# Patient Record
Sex: MALE | Race: WHITE | NOT HISPANIC OR LATINO | Employment: OTHER | URBAN - METROPOLITAN AREA
[De-identification: names, ages, dates, MRNs, and addresses within clinical notes are randomized per-mention and may not be internally consistent; named-entity substitution may affect disease eponyms.]

---

## 2017-09-27 ENCOUNTER — OFFICE VISIT (OUTPATIENT)
Dept: URGENT CARE | Facility: CLINIC | Age: 77
End: 2017-09-27
Payer: MEDICARE

## 2017-09-27 VITALS
SYSTOLIC BLOOD PRESSURE: 130 MMHG | HEIGHT: 69 IN | BODY MASS INDEX: 33.54 KG/M2 | WEIGHT: 226.44 LBS | HEART RATE: 45 BPM | OXYGEN SATURATION: 96 % | DIASTOLIC BLOOD PRESSURE: 60 MMHG | RESPIRATION RATE: 20 BRPM | TEMPERATURE: 96 F

## 2017-09-27 DIAGNOSIS — Z95.1 S/P CABG X 3: ICD-10-CM

## 2017-09-27 DIAGNOSIS — I25.2 MI, OLD: ICD-10-CM

## 2017-09-27 DIAGNOSIS — R42 DIZZINESS: ICD-10-CM

## 2017-09-27 DIAGNOSIS — R00.1 BRADYCARDIA: Primary | ICD-10-CM

## 2017-09-27 DIAGNOSIS — R53.83 FATIGUE, UNSPECIFIED TYPE: ICD-10-CM

## 2017-09-27 PROCEDURE — 1126F AMNT PAIN NOTED NONE PRSNT: CPT | Mod: S$GLB,,, | Performed by: NURSE PRACTITIONER

## 2017-09-27 PROCEDURE — 93010 ELECTROCARDIOGRAM REPORT: CPT | Mod: S$GLB,,, | Performed by: INTERNAL MEDICINE

## 2017-09-27 PROCEDURE — 3078F DIAST BP <80 MM HG: CPT | Mod: S$GLB,,, | Performed by: NURSE PRACTITIONER

## 2017-09-27 PROCEDURE — 99999 PR PBB SHADOW E&M-EST. PATIENT-LVL III: CPT | Mod: PBBFAC,,, | Performed by: NURSE PRACTITIONER

## 2017-09-27 PROCEDURE — 99499 UNLISTED E&M SERVICE: CPT | Mod: S$GLB,,, | Performed by: NURSE PRACTITIONER

## 2017-09-27 PROCEDURE — 1159F MED LIST DOCD IN RCRD: CPT | Mod: S$GLB,,, | Performed by: NURSE PRACTITIONER

## 2017-09-27 PROCEDURE — 93005 ELECTROCARDIOGRAM TRACING: CPT | Mod: S$GLB,,, | Performed by: NURSE PRACTITIONER

## 2017-09-27 PROCEDURE — 99213 OFFICE O/P EST LOW 20 MIN: CPT | Mod: S$GLB,,, | Performed by: NURSE PRACTITIONER

## 2017-09-27 PROCEDURE — 3075F SYST BP GE 130 - 139MM HG: CPT | Mod: S$GLB,,, | Performed by: NURSE PRACTITIONER

## 2017-09-27 PROCEDURE — 3008F BODY MASS INDEX DOCD: CPT | Mod: S$GLB,,, | Performed by: NURSE PRACTITIONER

## 2017-09-27 RX ORDER — ATORVASTATIN CALCIUM 40 MG/1
80 TABLET, FILM COATED ORAL DAILY
COMMUNITY
End: 2018-07-31

## 2017-09-27 RX ORDER — VIT C/E/ZN/COPPR/LUTEIN/ZEAXAN 250MG-90MG
1000 CAPSULE ORAL DAILY
COMMUNITY

## 2017-09-27 NOTE — PROGRESS NOTES
Subjective:       Patient ID: Jake Ortiz is a 77 y.o. male.    Chief Complaint: Dizziness    Dizziness:   Chronicity:  New  Onset:  In the past 7 days   Associated symptoms: light-headedness.no ear congestion, no ear pain, no fever, no headaches, no tinnitus, no nausea, no vomiting, no diaphoresis, no aural fullness, no weakness, no visual disturbances, no syncope, no palpitations, no facial weakness, no slurred speech, no numbness in extremities and no chest pain.    Review of Systems   Constitutional: Positive for fatigue. Negative for activity change, appetite change, chills, diaphoresis and fever.   HENT: Negative for ear discharge, ear pain, postnasal drip, rhinorrhea, sinus pain, sinus pressure, sneezing, sore throat and tinnitus.    Eyes: Negative for photophobia and visual disturbance.   Respiratory: Positive for shortness of breath. Negative for cough and wheezing.    Cardiovascular: Negative for chest pain, palpitations, leg swelling and syncope.        Recently dx with AFIB, xarelto started, follow up with cardiology (Dr. Demarco) in December; he increased his lisinopril to 1 tablet (not 1/2) 3 weeks ago, also takes 325 mg ASA daily (took am dose)   Gastrointestinal: Positive for abdominal pain. Negative for diarrhea, nausea and vomiting.   Skin: Negative for pallor.   Neurological: Positive for dizziness and light-headedness. Negative for tremors, seizures, syncope, facial asymmetry, speech difficulty, weakness, numbness and headaches.   Psychiatric/Behavioral: Negative for confusion. The patient is not nervous/anxious.        Objective:      Physical Exam   Constitutional: He is oriented to person, place, and time. He appears well-developed and well-nourished.  Non-toxic appearance. He does not have a sickly appearance. He does not appear ill. No distress.   Cardiovascular: Regular rhythm, S1 normal, S2 normal and normal heart sounds.  Bradycardia present.    Pulses:       Radial pulses are 2+ on the  right side, and 2+ on the left side.   Pulmonary/Chest: Effort normal and breath sounds normal. No accessory muscle usage. No apnea, no tachypnea and no bradypnea. No respiratory distress. He has no decreased breath sounds. He has no wheezes. He has no rhonchi. He has no rales. He exhibits no tenderness and no bony tenderness.   Neurological: He is alert and oriented to person, place, and time. He is not disoriented. No cranial nerve deficit or sensory deficit.   Skin: Skin is warm and dry. He is not diaphoretic.       Assessment:       1. Bradycardia    2. Fatigue, unspecified type    3. MI, old    4. S/P CABG x 3    5. Dizziness        Plan:   Jake was seen today for dizziness.    Diagnoses and all orders for this visit:    Bradycardia  -     EKG 12-lead; Future    Fatigue, unspecified type    MI, old  Comments:  30 years ago    S/P CABG x 3    Dizziness        Patient referred to ER immediately due to need for a higher level of care. Transported via EMS by to  [facility] ER. Patient stable and in no distress.

## 2017-10-12 ENCOUNTER — OFFICE VISIT (OUTPATIENT)
Dept: FAMILY MEDICINE | Facility: CLINIC | Age: 77
End: 2017-10-12
Payer: MEDICARE

## 2017-10-12 VITALS
WEIGHT: 220 LBS | BODY MASS INDEX: 32.58 KG/M2 | OXYGEN SATURATION: 98 % | DIASTOLIC BLOOD PRESSURE: 78 MMHG | SYSTOLIC BLOOD PRESSURE: 146 MMHG | RESPIRATION RATE: 18 BRPM | HEIGHT: 69 IN | HEART RATE: 82 BPM | TEMPERATURE: 97 F

## 2017-10-12 DIAGNOSIS — E79.0 HYPERURICEMIA: ICD-10-CM

## 2017-10-12 DIAGNOSIS — Z12.11 COLON CANCER SCREENING: ICD-10-CM

## 2017-10-12 DIAGNOSIS — M51.36 DDD (DEGENERATIVE DISC DISEASE), LUMBAR: ICD-10-CM

## 2017-10-12 DIAGNOSIS — R76.8 RHEUMATOID FACTOR POSITIVE: ICD-10-CM

## 2017-10-12 DIAGNOSIS — I10 ESSENTIAL HYPERTENSION: Primary | ICD-10-CM

## 2017-10-12 DIAGNOSIS — Z23 NEED FOR ZOSTER VACCINATION: ICD-10-CM

## 2017-10-12 DIAGNOSIS — E78.2 MIXED HYPERLIPIDEMIA: ICD-10-CM

## 2017-10-12 DIAGNOSIS — J98.4 MIXED RESTRICTIVE AND OBSTRUCTIVE LUNG DISEASE: Chronic | ICD-10-CM

## 2017-10-12 DIAGNOSIS — D71 GRANULOMATOUS DISEASE: ICD-10-CM

## 2017-10-12 DIAGNOSIS — I70.0 ARTERIOSCLEROSIS OF AORTA: ICD-10-CM

## 2017-10-12 DIAGNOSIS — I25.10 CORONARY ARTERY DISEASE INVOLVING NATIVE CORONARY ARTERY OF NATIVE HEART WITHOUT ANGINA PECTORIS: ICD-10-CM

## 2017-10-12 DIAGNOSIS — C61 ADENOCARCINOMA OF PROSTATE: ICD-10-CM

## 2017-10-12 DIAGNOSIS — J43.9 MIXED RESTRICTIVE AND OBSTRUCTIVE LUNG DISEASE: Chronic | ICD-10-CM

## 2017-10-12 PROCEDURE — G0009 ADMIN PNEUMOCOCCAL VACCINE: HCPCS | Mod: S$GLB,,, | Performed by: NURSE PRACTITIONER

## 2017-10-12 PROCEDURE — 90732 PPSV23 VACC 2 YRS+ SUBQ/IM: CPT | Mod: S$GLB,,, | Performed by: NURSE PRACTITIONER

## 2017-10-12 PROCEDURE — 96160 PT-FOCUSED HLTH RISK ASSMT: CPT | Mod: S$GLB,,, | Performed by: NURSE PRACTITIONER

## 2017-10-12 PROCEDURE — 99999 PR PBB SHADOW E&M-EST. PATIENT-LVL IV: CPT | Mod: PBBFAC,,, | Performed by: NURSE PRACTITIONER

## 2017-10-12 PROCEDURE — 99499 UNLISTED E&M SERVICE: CPT | Mod: S$GLB,,, | Performed by: NURSE PRACTITIONER

## 2017-10-12 RX ORDER — NAPROXEN SODIUM 220 MG/1
TABLET, FILM COATED ORAL
COMMUNITY
Start: 2017-09-30 | End: 2018-05-21

## 2017-10-12 RX ORDER — PHENOL 1.4 %
300 AEROSOL, SPRAY (ML) MUCOUS MEMBRANE
COMMUNITY
End: 2017-10-12

## 2017-10-17 PROBLEM — I70.0 ARTERIOSCLEROSIS OF AORTA: Status: ACTIVE | Noted: 2017-10-17

## 2017-10-17 PROBLEM — C61 ADENOCARCINOMA OF PROSTATE: Status: ACTIVE | Noted: 2017-10-17

## 2017-10-17 PROBLEM — D71 GRANULOMATOUS DISEASE: Status: ACTIVE | Noted: 2017-10-17

## 2017-10-19 ENCOUNTER — OFFICE VISIT (OUTPATIENT)
Dept: FAMILY MEDICINE | Facility: CLINIC | Age: 77
End: 2017-10-19
Payer: MEDICARE

## 2017-10-19 ENCOUNTER — HOSPITAL ENCOUNTER (OUTPATIENT)
Dept: RADIOLOGY | Facility: HOSPITAL | Age: 77
Discharge: HOME | End: 2017-10-19
Attending: FAMILY MEDICINE
Payer: MEDICARE

## 2017-10-19 VITALS
RESPIRATION RATE: 14 BRPM | WEIGHT: 216.06 LBS | HEART RATE: 81 BPM | HEIGHT: 70 IN | TEMPERATURE: 98 F | BODY MASS INDEX: 30.93 KG/M2 | SYSTOLIC BLOOD PRESSURE: 118 MMHG | DIASTOLIC BLOOD PRESSURE: 62 MMHG | OXYGEN SATURATION: 97 %

## 2017-10-19 DIAGNOSIS — J43.9 MIXED RESTRICTIVE AND OBSTRUCTIVE LUNG DISEASE: ICD-10-CM

## 2017-10-19 DIAGNOSIS — Z95.1 S/P CABG X 3: ICD-10-CM

## 2017-10-19 DIAGNOSIS — Z00.00 ANNUAL PHYSICAL EXAM: Primary | ICD-10-CM

## 2017-10-19 DIAGNOSIS — C61 ADENOCARCINOMA OF PROSTATE: ICD-10-CM

## 2017-10-19 DIAGNOSIS — J98.4 MIXED RESTRICTIVE AND OBSTRUCTIVE LUNG DISEASE: ICD-10-CM

## 2017-10-19 DIAGNOSIS — D71 GRANULOMATOUS DISEASE: ICD-10-CM

## 2017-10-19 DIAGNOSIS — R76.8 RHEUMATOID FACTOR POSITIVE: ICD-10-CM

## 2017-10-19 DIAGNOSIS — I10 ESSENTIAL HYPERTENSION: ICD-10-CM

## 2017-10-19 DIAGNOSIS — I70.0 ARTERIOSCLEROSIS OF AORTA: ICD-10-CM

## 2017-10-19 DIAGNOSIS — E78.2 MIXED HYPERLIPIDEMIA: ICD-10-CM

## 2017-10-19 DIAGNOSIS — I48.91 ATRIAL FIBRILLATION, UNSPECIFIED TYPE: ICD-10-CM

## 2017-10-19 DIAGNOSIS — I25.10 CORONARY ARTERY DISEASE INVOLVING NATIVE CORONARY ARTERY OF NATIVE HEART WITHOUT ANGINA PECTORIS: ICD-10-CM

## 2017-10-19 PROCEDURE — 99397 PER PM REEVAL EST PAT 65+ YR: CPT | Mod: S$GLB,,, | Performed by: FAMILY MEDICINE

## 2017-10-19 PROCEDURE — 71020 XR CHEST PA AND LATERAL: CPT | Mod: 26,,, | Performed by: RADIOLOGY

## 2017-10-19 PROCEDURE — 99999 PR PBB SHADOW E&M-EST. PATIENT-LVL III: CPT | Mod: PBBFAC,,, | Performed by: FAMILY MEDICINE

## 2017-10-19 PROCEDURE — 99499 UNLISTED E&M SERVICE: CPT | Mod: S$GLB,,, | Performed by: FAMILY MEDICINE

## 2017-10-19 PROCEDURE — 71020 XR CHEST PA AND LATERAL: CPT | Mod: TC,PO

## 2017-10-19 NOTE — PROGRESS NOTES
"Jake Ortiz presented for a  Medicare AWV and comprehensive Health Risk Assessment today. The following components were reviewed and updated:    · Medical history  · Family History  · Social history  · Allergies and Current Medications  · Health Risk Assessment  · Health Maintenance  · Care Team     ** See Completed Assessments for Annual Wellness Visit within the encounter summary.**       The following assessments were completed:  · Living Situation  · CAGE  · Depression Screening  · Timed Get Up and Go  · Whisper Test  · Cognitive Function Screening  · Nutrition Screening  · ADL Screening  · PAQ Screening    Vitals:    10/12/17 1054   BP: (!) 146/78   Pulse: 82   Resp: 18   Temp: 97.4 °F (36.3 °C)   TempSrc: Tympanic   SpO2: 98%   Weight: 99.8 kg (220 lb 0.3 oz)   Height: 5' 9" (1.753 m)     Body mass index is 32.49 kg/m².  Physical Exam      Diagnoses and health risks identified today and associated recommendations/orders:    1. Essential hypertension  Uncontrolled. Follow up with PCP    2. Mixed hyperlipidemia  Uncontrolled. Needs current lipid panel. Continue statin therapy with low fat diet    3. Coronary artery disease involving native coronary artery of native heart without angina pectoris  Stable. Continue current treatment plan    4. DDD (degenerative disc disease), lumbar  Stable. Continue current treatment plan    5. Rheumatoid factor positive  Follow up with rheum    6. Granulomatous disease  Stable. Continue treatment plan    7. Arteriosclerosis of aorta  stable    8. Adenocarcinoma of prostate  Remission. Follow up with urology and heme/onc    9. Mixed restrictive and obstructive lung disease  stable    10. Hyperuricemia  stable    11. Colon cancer screening  appt schedule with PCP    12. Need for zoster vaccination  appt scheduled with PCP      Provided Jake with a 5-10 year written screening schedule and personal prevention plan. Recommendations were developed using the USPSTF age appropriate " recommendations. Education, counseling, and referrals were provided as needed. After Visit Summary printed and given to patient which includes a list of additional screenings\tests needed.    No Follow-up on file.    Shraddha Alvarenga NP

## 2017-10-19 NOTE — PROGRESS NOTES
Jake Ortiz 77 y.o. White male      HPI- The patient is presenting today for Annual Exam  78yo male presents today for annual examination. No hearing or vision concerns are reported. Diet is described as stable. He denies any sleep related issues and there is no concern for any anxiety or depression. Elimination patterns are described within normal limits. Patient has underlying atrial fibrillation and has recently undergone a pacemaker placement secondary to symptomatic bradycardia. He sees Dr. Menendez for care- next visit is scheduled in December 2017. Patient has been followed at [facility] for prostate cancer- he has not had any assessment this year. He is unable to recall the name of his specialist. There is no active treatment plan in place with regard to the prostate cancer currently. In 2015 he was seen by Pulmonology with further recommendations for CT of the chest, PFT, etc. He has been lost to follow-up. There are no respiratory concerns reported. In 2014 he had a workup per Rheumatology with findings of positive rheumatoid factor but has again been lost to follow-up. He does report some low back pain but otherwise denies any joint discomfort. Compliance with his BP medication regimen, Xarelto and statin use is reported.      Past Medical History:   Diagnosis Date    Abnormal PFT     Arthritis     Atrial fibrillation 10/19/2017    Back pain     Coronary artery disease     Hyperlipemia     Hypertension     Myocardial infarction     Obesity     Prostate cancer 2015    Tobacco dependence      Past Surgical History:   Procedure Laterality Date    CORONARY ARTERY BYPASS GRAFT  1987    SPINE SURGERY      fusion    TONSILLECTOMY       Family History   Problem Relation Age of Onset    Heart attack Mother     Diabetes Mother     Heart disease Mother     Stroke Father     Heart disease Father     Heart disease Brother      Current Outpatient Prescriptions   Medication Sig Dispense  "Refill    aspirin 81 MG Chew       atorvastatin (LIPITOR) 40 MG tablet Take 40 mg by mouth once daily.      cholecalciferol, vitamin D3, (VITAMIN D3) 1,000 unit capsule Take 5,000 Units by mouth once daily.      lisinopril-hydrochlorothiazide (PRINZIDE,ZESTORETIC) 20-25 mg Tab Take 1 tablet by mouth once daily.      multivitamin capsule Take 1 capsule by mouth once daily.      rivaroxaban (XARELTO) 20 mg Tab Take 20 mg by mouth.       No current facility-administered medications for this visit.      Allergies-  Review of patient's allergies indicates:   Allergen Reactions    Amiodarone      Other reaction(s): bp becomes too low     ROS-   CONSTITUTIONAL- No fever, chills, fatigue or weight loss  HEENT- No vision changes, ear pain, hearing loss  CHEST- No cough, shortness of breath  CV- No chest pain, palpitations, edema  Abdomen- No abdominal pain, change in bowel habits, blood in stool  - No change in urination, no dysuria, no hematuria, no hesitancy, no dribbling, no urgency  Neurologic- No headaches, speech changes, vertigo, gait changes  Musculoskeletal- No joint pain, + back pain, no myalgias  Hematologic- + bruising, no bleeding, no lymphadenopathy  Psych- No change in mood, no trouble with sleep  Integument- No rashes, no skin changes    /62   Pulse 81   Temp 97.5 °F (36.4 °C) (Temporal)   Resp 14   Ht 5' 10" (1.778 m)   Wt 98 kg (216 lb 0.8 oz)   SpO2 97%   BMI 31.00 kg/m²     PE-  CONSTITIONAL- Well developed, well nourished, no acute distress  HEENT- Normal cephalic, atraumatic, PERRLA, right ear external- no abnormality, left ear external- no abnormality, right TM- normal to visualization, left TM- normal to visualization, moist mucus membranes, no oropharyngeal abnormality visualized nasal turbinates are clear  Neck- Full range of motion, no thyromegaly, no cervical lymphadenopathy  CV- Normal rate and rhythm, heart sounds normal, no murmurs, rubs or gallops  Chest- Breath sounds " are normal and equal bilaterally, normal inspiratory and expiratory efforts  Abdomen- Bowel sounds are equal in all four quadrants, non tender to palpation, soft, no distention  Musculoskeletal- Normal ROM of the extremities, no tenderness  Neurologic- Alert, orientated x 3, cranial nerves intact  Skin- Warm and dry to touch, no rashes, no visible lesions  Psych- Mood and affect normal, Behavior normal    Assessment and Plan-  Annual physical exam  Spent length of time today discussing previous workups and need for further assessment based on several abnormalities that have previously been identified and for which he has been lost to follow-up. General health screening recommendations have also been reviewed. Emphasized healthy eating and physical activity. Anticipatory guidance has been provided with regard to age and informational handouts have been given. He has declined immunization update today. He has declined orders for C scope. He has declined scheduling specialty evaluations. Next wellness evaluation is advised with PCP in 1 year.    Atrial fibrillation, unspecified type  Rate controlled. On Xarelto in combination with ASA. Continuation of current treatment is advised.    Essential hypertension  Stable with current treatment. Recommend continuation of RX measures in combination with heart healthy diet. Target BP <140/90.    Mixed hyperlipidemia  Currently on statin therapy per Cardiology. Per Dr. Menendez's office there is no lipid panel on file within the past year. Will arrange for updated labs.  -     Lipid panel; Future; Expected date: 10/19/2017  -     ALT (SGPT); Future; Expected date: 10/19/2017  -     AST (SGOT); Future; Expected date: 10/19/2017    Mixed restrictive and obstructive lung disease  No acute findings on CXR. Recommend proceeding with PFT's and further assessment per Pulmonology. He has declined.  -     X-Ray Chest PA And Lateral; Future; Expected date: 10/19/2017    Granulomatous  disease  Reviewed notes from Pulmonology with the patient. Discussed further assessment per Pulmonology as workup was never completed previously. He has declined at this time.    Rheumatoid factor positive  Reviewed previous records per Rheumatology. Discussed having update specialty evaluation as well as updated lab assessment- he has declined.    Adenocarcinoma of prostate  Overdue for assessment. Appointment with his specialist at [facility] has been arranged today.    Coronary artery disease involving native coronary artery of native heart without angina pectoris  Continue with treatment and follow-up recommendations as per Cardiology.    Arteriosclerosis of aorta  Emphasized need for BP and lipid control.    S/P CABG x 3  Continue with treatment and follow-up recommendations as per Cardiology.    Records request from Dr. Menendez for most recent labs  Records request [facility]

## 2017-10-19 NOTE — PROGRESS NOTES
Appointment made for Oncology   For Dr. Cueto at [facility]  Dec 1,2017  Pt is aware and records will be sent  Also records will be sent from  Dr. Menendez's office. they just went  from  Paper charts t/ will request records that are in storage  And fax

## 2017-10-24 ENCOUNTER — LAB VISIT (OUTPATIENT)
Dept: LAB | Facility: HOSPITAL | Age: 77
End: 2017-10-24
Attending: FAMILY MEDICINE
Payer: MEDICARE

## 2017-10-24 DIAGNOSIS — E78.2 MIXED HYPERLIPIDEMIA: ICD-10-CM

## 2017-10-24 PROCEDURE — 84460 ALANINE AMINO (ALT) (SGPT): CPT

## 2017-10-24 PROCEDURE — 36415 COLL VENOUS BLD VENIPUNCTURE: CPT | Mod: PO

## 2017-10-24 PROCEDURE — 80061 LIPID PANEL: CPT

## 2017-10-24 PROCEDURE — 84450 TRANSFERASE (AST) (SGOT): CPT

## 2017-10-25 LAB
ALT SERPL W/O P-5'-P-CCNC: 14 U/L
AST SERPL-CCNC: 21 U/L
CHOLEST SERPL-MCNC: 137 MG/DL
CHOLEST/HDLC SERPL: 3.7 {RATIO}
HDLC SERPL-MCNC: 37 MG/DL
HDLC SERPL: 27 %
LDLC SERPL CALC-MCNC: 69.2 MG/DL
NONHDLC SERPL-MCNC: 100 MG/DL
TRIGL SERPL-MCNC: 154 MG/DL

## 2017-11-14 ENCOUNTER — OFFICE VISIT (OUTPATIENT)
Dept: FAMILY MEDICINE | Facility: CLINIC | Age: 77
End: 2017-11-14
Payer: MEDICARE

## 2017-11-14 VITALS
BODY MASS INDEX: 31.88 KG/M2 | HEIGHT: 70 IN | HEART RATE: 77 BPM | SYSTOLIC BLOOD PRESSURE: 126 MMHG | OXYGEN SATURATION: 100 % | WEIGHT: 222.69 LBS | DIASTOLIC BLOOD PRESSURE: 78 MMHG | TEMPERATURE: 97 F

## 2017-11-14 DIAGNOSIS — I10 ESSENTIAL HYPERTENSION: ICD-10-CM

## 2017-11-14 DIAGNOSIS — J98.4 MIXED RESTRICTIVE AND OBSTRUCTIVE LUNG DISEASE: ICD-10-CM

## 2017-11-14 DIAGNOSIS — J18.9 COMMUNITY ACQUIRED PNEUMONIA OF LEFT LOWER LOBE OF LUNG: Primary | ICD-10-CM

## 2017-11-14 DIAGNOSIS — J43.9 MIXED RESTRICTIVE AND OBSTRUCTIVE LUNG DISEASE: ICD-10-CM

## 2017-11-14 DIAGNOSIS — Z95.1 S/P CABG X 3: ICD-10-CM

## 2017-11-14 PROCEDURE — 99499 UNLISTED E&M SERVICE: CPT | Mod: S$GLB,,, | Performed by: FAMILY MEDICINE

## 2017-11-14 PROCEDURE — 99214 OFFICE O/P EST MOD 30 MIN: CPT | Mod: S$GLB,,, | Performed by: FAMILY MEDICINE

## 2017-11-14 PROCEDURE — 99999 PR PBB SHADOW E&M-EST. PATIENT-LVL III: CPT | Mod: PBBFAC,,, | Performed by: FAMILY MEDICINE

## 2017-11-14 RX ORDER — FLUTICASONE PROPIONATE 50 MCG
2 SPRAY, SUSPENSION (ML) NASAL DAILY
Qty: 1 BOTTLE | Refills: 0 | Status: SHIPPED | OUTPATIENT
Start: 2017-11-14 | End: 2017-12-18 | Stop reason: SDUPTHER

## 2017-11-14 RX ORDER — DOXYCYCLINE 100 MG/1
100 CAPSULE ORAL 2 TIMES DAILY
Qty: 20 CAPSULE | Refills: 0 | Status: SHIPPED | OUTPATIENT
Start: 2017-11-14 | End: 2017-11-24

## 2017-11-14 NOTE — PROGRESS NOTES
Subjective:       Patient ID: Jake Ortiz is a 77 y.o. male.    Chief Complaint: Cough and Nasal Congestion      HPI Comments:       Current Outpatient Prescriptions:     aspirin 81 MG Chew, , Disp: , Rfl:     atorvastatin (LIPITOR) 40 MG tablet, Take 40 mg by mouth once daily., Disp: , Rfl:     cholecalciferol, vitamin D3, (VITAMIN D3) 1,000 unit capsule, Take 5,000 Units by mouth once daily., Disp: , Rfl:     lisinopril-hydrochlorothiazide (PRINZIDE,ZESTORETIC) 20-25 mg Tab, Take 1 tablet by mouth once daily., Disp: , Rfl:     multivitamin capsule, Take 1 capsule by mouth once daily., Disp: , Rfl:     rivaroxaban (XARELTO) 20 mg Tab, Take 20 mg by mouth., Disp: , Rfl:     doxycycline (MONODOX) 100 MG capsule, Take 1 capsule (100 mg total) by mouth 2 (two) times daily., Disp: 20 capsule, Rfl: 0    fluticasone (FLONASE) 50 mcg/actuation nasal spray, 2 sprays by Each Nare route once daily., Disp: 1 Bottle, Rfl: 0      Is my first time seeing this patient..  He presents with 2-3 day history of clear rhinorrhea, nonproductive cough, sore throat, sneezing.  No fever chills.  No GI symptoms.  No specific sick contacts    He has history of coronary disease and atrial fibrillation and some kind of pulmonary problems fairly unspecified, which she says has resolved.  He's been taking NyQuil and Tylenol       Cough   This is a new problem. The current episode started in the past 7 days. The problem has been gradually worsening. The problem occurs every few minutes. The cough is non-productive. Associated symptoms include nasal congestion, postnasal drip, rhinorrhea, a sore throat and shortness of breath. Pertinent negatives include no chest pain, chills, ear congestion, ear pain, fever, headaches, hemoptysis, myalgias, sweats or wheezing. Nothing aggravates the symptoms. Treatments tried: NyQuil, Tylenol. The treatment provided mild relief. There is no history of environmental allergies.     Review of Systems  "  Constitutional: Negative for activity change, appetite change, chills and fever.   HENT: Positive for postnasal drip, rhinorrhea and sore throat. Negative for ear pain.    Respiratory: Positive for cough and shortness of breath. Negative for hemoptysis and wheezing.    Cardiovascular: Negative for chest pain.   Gastrointestinal: Negative for abdominal pain, diarrhea and nausea.   Genitourinary: Negative for difficulty urinating.   Musculoskeletal: Negative for arthralgias and myalgias.   Allergic/Immunologic: Negative for environmental allergies.   Neurological: Negative for dizziness and headaches.   Psychiatric/Behavioral: Negative for confusion.       Objective:      Vitals:    11/14/17 1413   BP: 126/78   Pulse: 77   Temp: 97.1 °F (36.2 °C)   TempSrc: Tympanic   SpO2: 100%   Weight: 101 kg (222 lb 10.6 oz)   Height: 5' 10" (1.778 m)   PainSc: 0-No pain     Physical Exam   Constitutional: He is oriented to person, place, and time. He appears well-developed and well-nourished.  Non-toxic appearance. He does not appear ill. No distress.   HENT:   Head: Normocephalic.   Right Ear: Tympanic membrane, external ear and ear canal normal.   Left Ear: Tympanic membrane, external ear and ear canal normal.   Nose: Mucosal edema present.   Mouth/Throat: Mucous membranes are normal. Posterior oropharyngeal edema present. No oropharyngeal exudate or posterior oropharyngeal erythema.   Neck: Neck supple. No thyromegaly present.   Cardiovascular: Normal rate, regular rhythm and normal heart sounds.    No murmur heard.  Pulmonary/Chest: Effort normal. He has no decreased breath sounds. He has no wheezes. He has no rhonchi. He has rales in the left middle field and the left lower field.   Abdominal: Soft. He exhibits no distension.   Musculoskeletal: He exhibits no edema.   Lymphadenopathy:     He has no cervical adenopathy.   Neurological: He is alert and oriented to person, place, and time.   Skin: Skin is warm and dry. He is " not diaphoretic.   Psychiatric: He has a normal mood and affect. His behavior is normal. Judgment and thought content normal.   Nursing note and vitals reviewed.      Assessment:       1. Community acquired pneumonia of left lower lobe of lung    2. Mixed restrictive and obstructive lung disease    3. S/P CABG x 3    4. Essential hypertension        Plan:   Community acquired pneumonia of left lower lobe of lung  Comments:  Only mildly ill.  Doxycycline ×10 days.  Flonase, Zyrtec    Mixed restrictive and obstructive lung disease  Comments:  No recent symptoms    S/P CABG x 3  Comments:  Stable.  Asymptomatic    Essential hypertension  Comments:  Controlled    Other orders  -     doxycycline (MONODOX) 100 MG capsule; Take 1 capsule (100 mg total) by mouth 2 (two) times daily.  Dispense: 20 capsule; Refill: 0  -     fluticasone (FLONASE) 50 mcg/actuation nasal spray; 2 sprays by Each Nare route once daily.  Dispense: 1 Bottle; Refill: 0

## 2017-11-16 ENCOUNTER — OFFICE VISIT (OUTPATIENT)
Dept: FAMILY MEDICINE | Facility: CLINIC | Age: 77
End: 2017-11-16
Payer: MEDICARE

## 2017-11-16 VITALS
HEIGHT: 70 IN | BODY MASS INDEX: 31.59 KG/M2 | DIASTOLIC BLOOD PRESSURE: 74 MMHG | OXYGEN SATURATION: 98 % | SYSTOLIC BLOOD PRESSURE: 124 MMHG | TEMPERATURE: 96 F | WEIGHT: 220.69 LBS | HEART RATE: 80 BPM

## 2017-11-16 DIAGNOSIS — J98.4 MIXED RESTRICTIVE AND OBSTRUCTIVE LUNG DISEASE: ICD-10-CM

## 2017-11-16 DIAGNOSIS — I10 ESSENTIAL HYPERTENSION: ICD-10-CM

## 2017-11-16 DIAGNOSIS — J18.9 COMMUNITY ACQUIRED PNEUMONIA OF LEFT LOWER LOBE OF LUNG: Primary | ICD-10-CM

## 2017-11-16 DIAGNOSIS — J43.9 MIXED RESTRICTIVE AND OBSTRUCTIVE LUNG DISEASE: ICD-10-CM

## 2017-11-16 PROCEDURE — 96372 THER/PROPH/DIAG INJ SC/IM: CPT | Mod: S$GLB,,, | Performed by: FAMILY MEDICINE

## 2017-11-16 PROCEDURE — 99499 UNLISTED E&M SERVICE: CPT | Mod: S$GLB,,, | Performed by: FAMILY MEDICINE

## 2017-11-16 PROCEDURE — 99999 PR PBB SHADOW E&M-EST. PATIENT-LVL IV: CPT | Mod: PBBFAC,,, | Performed by: FAMILY MEDICINE

## 2017-11-16 PROCEDURE — 99213 OFFICE O/P EST LOW 20 MIN: CPT | Mod: 25,S$GLB,, | Performed by: FAMILY MEDICINE

## 2017-11-16 RX ORDER — CODEINE PHOSPHATE AND GUAIFENESIN 10; 100 MG/5ML; MG/5ML
5 SOLUTION ORAL EVERY 6 HOURS PRN
Qty: 120 ML | Refills: 0 | Status: SHIPPED | OUTPATIENT
Start: 2017-11-16 | End: 2017-11-26

## 2017-11-16 RX ORDER — METHYLPREDNISOLONE ACETATE 80 MG/ML
80 INJECTION, SUSPENSION INTRA-ARTICULAR; INTRALESIONAL; INTRAMUSCULAR; SOFT TISSUE
Status: COMPLETED | OUTPATIENT
Start: 2017-11-16 | End: 2017-11-16

## 2017-11-16 RX ADMIN — METHYLPREDNISOLONE ACETATE 80 MG: 80 INJECTION, SUSPENSION INTRA-ARTICULAR; INTRALESIONAL; INTRAMUSCULAR; SOFT TISSUE at 09:11

## 2017-11-16 NOTE — PROGRESS NOTES
Subjective:       Patient ID: Jake Ortiz is a 77 y.o. male.    Chief Complaint: Cough and Nasal Congestion      HPI Comments:       Current Outpatient Prescriptions:     aspirin 81 MG Chew, , Disp: , Rfl:     atorvastatin (LIPITOR) 40 MG tablet, Take 40 mg by mouth once daily., Disp: , Rfl:     cholecalciferol, vitamin D3, (VITAMIN D3) 1,000 unit capsule, Take 5,000 Units by mouth once daily., Disp: , Rfl:     doxycycline (MONODOX) 100 MG capsule, Take 1 capsule (100 mg total) by mouth 2 (two) times daily., Disp: 20 capsule, Rfl: 0    fluticasone (FLONASE) 50 mcg/actuation nasal spray, 2 sprays by Each Nare route once daily., Disp: 1 Bottle, Rfl: 0    lisinopril-hydrochlorothiazide (PRINZIDE,ZESTORETIC) 20-25 mg Tab, Take 1 tablet by mouth once daily., Disp: , Rfl:     multivitamin capsule, Take 1 capsule by mouth once daily., Disp: , Rfl:     rivaroxaban (XARELTO) 20 mg Tab, Take 20 mg by mouth., Disp: , Rfl:     guaifenesin-codeine 100-10 mg/5 ml (CHERATUSSIN AC)  mg/5 mL syrup, Take 5 mLs by mouth every 6 (six) hours as needed for Cough., Disp: 120 mL, Rfl: 0  No current facility-administered medications for this visit.       Seen and treated here 48 hours ago for CAP.  Taking the doxycycline but his cough is gotten worse.  Took some kind of nighttime cold medicine that worked for couple hours, but his cough is keeping him up.  Yellow sputum.  No shortness of breath or new symptoms       Review of Systems   Constitutional: Negative for activity change, appetite change and fever.   HENT: Negative for sore throat.    Respiratory: Positive for cough. Negative for shortness of breath.    Cardiovascular: Negative for chest pain.   Gastrointestinal: Negative for abdominal pain, diarrhea and nausea.   Genitourinary: Negative for difficulty urinating.   Musculoskeletal: Negative for arthralgias and myalgias.   Neurological: Negative for dizziness and headaches.       Objective:      Vitals:    11/16/17  "0846   BP: 124/74   Pulse: 80   Temp: 96 °F (35.6 °C)   TempSrc: Tympanic   SpO2: 98%   Weight: 100.1 kg (220 lb 10.9 oz)   Height: 5' 10" (1.778 m)   PainSc: 0-No pain     Physical Exam   Constitutional: He is oriented to person, place, and time. He appears well-developed and well-nourished.  Non-toxic appearance. He does not appear ill. No distress.   HENT:   Head: Normocephalic.   Mouth/Throat: No oropharyngeal exudate.   Neck: Neck supple. No thyromegaly present.   Cardiovascular: Normal rate, regular rhythm and normal heart sounds.    No murmur heard.  Pulmonary/Chest: Effort normal and breath sounds normal. He has no wheezes. He has no rales.   Abdominal: Soft. He exhibits no distension.   Musculoskeletal: He exhibits no edema.   Lymphadenopathy:     He has no cervical adenopathy.   Neurological: He is alert and oriented to person, place, and time.   Skin: Skin is warm and dry. He is not diaphoretic.   Psychiatric: He has a normal mood and affect. His behavior is normal. Judgment and thought content normal.   Nursing note and vitals reviewed.      Assessment:       1. Community acquired pneumonia of left lower lobe of lung    2. Mixed restrictive and obstructive lung disease    3. Essential hypertension        Plan:   Community acquired pneumonia of left lower lobe of lung  Comments:  on doxy. Breath sounds improved, cough is worse.  IM depo-medrol. Cheratuss hs; robitussin-DM during day    Mixed restrictive and obstructive lung disease  Comments:  stable    Essential hypertension  Comments:  controlled    Other orders  -     methylPREDNISolone acetate injection 80 mg; Inject 1 mL (80 mg total) into the muscle one time.  -     guaifenesin-codeine 100-10 mg/5 ml (CHERATUSSIN AC)  mg/5 mL syrup; Take 5 mLs by mouth every 6 (six) hours as needed for Cough.  Dispense: 120 mL; Refill: 0          "

## 2017-12-18 RX ORDER — FLUTICASONE PROPIONATE 50 MCG
SPRAY, SUSPENSION (ML) NASAL
Qty: 1 BOTTLE | Refills: 5 | Status: SHIPPED | OUTPATIENT
Start: 2017-12-18 | End: 2018-05-14

## 2018-01-04 ENCOUNTER — TELEPHONE (OUTPATIENT)
Dept: FAMILY MEDICINE | Facility: CLINIC | Age: 78
End: 2018-01-04

## 2018-01-04 DIAGNOSIS — R73.9 HYPERGLYCEMIA: Primary | ICD-10-CM

## 2018-01-04 NOTE — TELEPHONE ENCOUNTER
Labs from [facility] have been received. Patient with persistent elevated glucose levels. Recommend he undergo A1c measurement.

## 2018-01-05 ENCOUNTER — LAB VISIT (OUTPATIENT)
Dept: LAB | Facility: HOSPITAL | Age: 78
End: 2018-01-05
Attending: FAMILY MEDICINE
Payer: MEDICARE

## 2018-01-05 DIAGNOSIS — R73.9 HYPERGLYCEMIA: ICD-10-CM

## 2018-01-05 LAB
ESTIMATED AVG GLUCOSE: 169 MG/DL
HBA1C MFR BLD HPLC: 7.5 %

## 2018-01-05 PROCEDURE — 36415 COLL VENOUS BLD VENIPUNCTURE: CPT | Mod: PO

## 2018-01-05 PROCEDURE — 83036 HEMOGLOBIN GLYCOSYLATED A1C: CPT

## 2018-01-09 ENCOUNTER — TELEPHONE (OUTPATIENT)
Dept: FAMILY MEDICINE | Facility: CLINIC | Age: 78
End: 2018-01-09

## 2018-01-09 NOTE — TELEPHONE ENCOUNTER
----- Message from Chanda Brush MD sent at 1/6/2018  9:16 AM CST -----  Labs are consistent with diabetes. Please schedule a visit to further discuss- needs to be this week.

## 2018-01-09 NOTE — TELEPHONE ENCOUNTER
Unable to reach pt, left voice mail.      Notes Recorded by Chanda Brush MD on 1/6/2018 at 9:16 AM CST  Labs are consistent with diabetes. Please schedule a visit to further discuss- needs to be this week.

## 2018-01-09 NOTE — TELEPHONE ENCOUNTER
----- Message from Mae Mendoza sent at 1/9/2018  3:09 PM CST -----  Contact: pt  Pt calling in regards to a missed call and can be reached at .[phone] ([email or web address])

## 2018-01-12 ENCOUNTER — LAB VISIT (OUTPATIENT)
Dept: LAB | Facility: HOSPITAL | Age: 78
End: 2018-01-12
Attending: FAMILY MEDICINE
Payer: MEDICARE

## 2018-01-12 ENCOUNTER — OFFICE VISIT (OUTPATIENT)
Dept: FAMILY MEDICINE | Facility: CLINIC | Age: 78
End: 2018-01-12
Payer: MEDICARE

## 2018-01-12 VITALS
BODY MASS INDEX: 34.28 KG/M2 | WEIGHT: 226.19 LBS | TEMPERATURE: 97 F | HEART RATE: 73 BPM | HEIGHT: 68 IN | SYSTOLIC BLOOD PRESSURE: 120 MMHG | OXYGEN SATURATION: 98 % | DIASTOLIC BLOOD PRESSURE: 70 MMHG | RESPIRATION RATE: 15 BRPM

## 2018-01-12 DIAGNOSIS — I70.0 ARTERIOSCLEROSIS OF AORTA: ICD-10-CM

## 2018-01-12 DIAGNOSIS — J43.9 MIXED RESTRICTIVE AND OBSTRUCTIVE LUNG DISEASE: ICD-10-CM

## 2018-01-12 DIAGNOSIS — E66.9 OBESITY (BMI 30-39.9): ICD-10-CM

## 2018-01-12 DIAGNOSIS — E11.69 DIABETES MELLITUS TYPE 2 IN OBESE: Primary | ICD-10-CM

## 2018-01-12 DIAGNOSIS — G47.9 TROUBLE IN SLEEPING: ICD-10-CM

## 2018-01-12 DIAGNOSIS — J98.4 MIXED RESTRICTIVE AND OBSTRUCTIVE LUNG DISEASE: ICD-10-CM

## 2018-01-12 DIAGNOSIS — D71 GRANULOMATOUS DISEASE: ICD-10-CM

## 2018-01-12 DIAGNOSIS — R53.83 FATIGUE, UNSPECIFIED TYPE: ICD-10-CM

## 2018-01-12 DIAGNOSIS — I10 ESSENTIAL HYPERTENSION: ICD-10-CM

## 2018-01-12 DIAGNOSIS — C61 ADENOCARCINOMA OF PROSTATE: ICD-10-CM

## 2018-01-12 DIAGNOSIS — Z95.0 PACEMAKER: ICD-10-CM

## 2018-01-12 DIAGNOSIS — E66.9 DIABETES MELLITUS TYPE 2 IN OBESE: Primary | ICD-10-CM

## 2018-01-12 DIAGNOSIS — E78.2 MIXED HYPERLIPIDEMIA: ICD-10-CM

## 2018-01-12 DIAGNOSIS — I48.91 ATRIAL FIBRILLATION, UNSPECIFIED TYPE: ICD-10-CM

## 2018-01-12 DIAGNOSIS — I25.10 CORONARY ARTERY DISEASE INVOLVING NATIVE CORONARY ARTERY OF NATIVE HEART WITHOUT ANGINA PECTORIS: ICD-10-CM

## 2018-01-12 LAB
BASOPHILS # BLD AUTO: 0.05 K/UL
BASOPHILS NFR BLD: 0.9 %
CREAT UR-MCNC: 191 MG/DL
DIFFERENTIAL METHOD: ABNORMAL
EOSINOPHIL # BLD AUTO: 0.1 K/UL
EOSINOPHIL NFR BLD: 2 %
ERYTHROCYTE [DISTWIDTH] IN BLOOD BY AUTOMATED COUNT: 18.6 %
HCT VFR BLD AUTO: 29.7 %
HGB BLD-MCNC: 9.1 G/DL
IMM GRANULOCYTES # BLD AUTO: 0.01 K/UL
IMM GRANULOCYTES NFR BLD AUTO: 0.2 %
LYMPHOCYTES # BLD AUTO: 0.9 K/UL
LYMPHOCYTES NFR BLD: 17.3 %
MCH RBC QN AUTO: 25 PG
MCHC RBC AUTO-ENTMCNC: 30.6 G/DL
MCV RBC AUTO: 82 FL
MICROALBUMIN UR DL<=1MG/L-MCNC: 243 UG/ML
MICROALBUMIN/CREATININE RATIO: 127.2 UG/MG
MONOCYTES # BLD AUTO: 0.7 K/UL
MONOCYTES NFR BLD: 12.9 %
NEUTROPHILS # BLD AUTO: 3.6 K/UL
NEUTROPHILS NFR BLD: 66.7 %
NRBC BLD-RTO: 0 /100 WBC
PLATELET # BLD AUTO: 317 K/UL
PMV BLD AUTO: 10.9 FL
RBC # BLD AUTO: 3.64 M/UL
TSH SERPL DL<=0.005 MIU/L-ACNC: 2.18 UIU/ML
WBC # BLD AUTO: 5.44 K/UL

## 2018-01-12 PROCEDURE — 84443 ASSAY THYROID STIM HORMONE: CPT

## 2018-01-12 PROCEDURE — 36415 COLL VENOUS BLD VENIPUNCTURE: CPT | Mod: PO

## 2018-01-12 PROCEDURE — 99499 UNLISTED E&M SERVICE: CPT | Mod: S$GLB,,, | Performed by: FAMILY MEDICINE

## 2018-01-12 PROCEDURE — 99999 PR PBB SHADOW E&M-EST. PATIENT-LVL IV: CPT | Mod: PBBFAC,,, | Performed by: FAMILY MEDICINE

## 2018-01-12 PROCEDURE — 82043 UR ALBUMIN QUANTITATIVE: CPT

## 2018-01-12 PROCEDURE — 85025 COMPLETE CBC W/AUTO DIFF WBC: CPT

## 2018-01-12 PROCEDURE — 99214 OFFICE O/P EST MOD 30 MIN: CPT | Mod: S$GLB,,, | Performed by: FAMILY MEDICINE

## 2018-01-12 RX ORDER — INSULIN PUMP SYRINGE, 3 ML
EACH MISCELLANEOUS
Qty: 1 EACH | Refills: 0 | Status: SHIPPED | OUTPATIENT
Start: 2018-01-12 | End: 2022-04-26 | Stop reason: CLARIF

## 2018-01-12 RX ORDER — LANCETS
EACH MISCELLANEOUS
Qty: 100 EACH | Refills: 11 | Status: SHIPPED | OUTPATIENT
Start: 2018-01-12 | End: 2018-03-05 | Stop reason: SDUPTHER

## 2018-01-12 NOTE — PATIENT INSTRUCTIONS
Diet: Diabetes  Food is an important tool that you can use to control diabetes and stay healthy. Eating well-balanced meals in the correct amounts will help you control your blood glucose levels and prevent low blood sugar reactions. It will also help you reduce the health risks of diabetes. There is no one specific diet that is right for everyone with diabetes. But there are general guidelines to follow. A registered dietitian (RD) will create a tailored diet approach thats just right for you. He or she will also help you plan healthy meals and snacks. If you have any questions, call your dietitian for advice.     Guidelines for success  Talk with your healthcare provider before starting a diabetes diet or weight loss program. If you haven't talked with a dietitian yet, ask your provider for a referral. The following guidelines can help you succeed:  · Select foods from the 6 food groups below. Your dietitian will help you find food choices within each group. He or she will also show you serving sizes and how many servings you can have at each meal.  ¨ Grains, beans, and starchy vegetables  ¨ Vegetables  ¨ Fruit  ¨ Milk or yogurt  ¨ Meat, poultry, fish, or tofu  ¨ Healthy fats  · Check your blood sugar levels as directed by your provider. Take any medicine as prescribed by your provider.  · Learn to read food labels and pick the right portion sizes.  · Eat only the amount of food in your meal plan. Eat about the same amount of food at regular times each day. Dont skip meals. Eat meals 4 to 5 hours apart, with snacks in between.  · Limit alcohol. It raises blood sugar levels. Drink water or calorie-free diet drinks that use safe sweeteners.  · Eat less fat to help lower your risk of heart disease. Use nonfat or low-fat dairy products and lean meats. Avoid fried foods. Use cooking oils that are unsaturated, such as olive, canola, or peanut oil.  · Talk with your dietitian about safe sugar substitutes.  · Avoid  added salt. It can contribute to high blood pressure, which can cause heart disease. People with diabetes already have a risk of high blood pressure and heart disease.  · Stay at a healthy weight. If you need to lose weight, cut down on your portion sizes. But dont skip meals. Exercise is an important part of any weight management program. Talk with your provider about an exercise program thats right for you.  · For more information about the best diet plan for you, talk with a registered dietitian (RD). To find an RD in your area, contact:  ¨ Academy of Nutrition and Dietetics www.eatright.org  ¨ The American Diabetes Association 101-886-2303 www.diabetes.org  Date Last Reviewed: 8/1/2016 © 2000-2017 [employer]. [address] [place] [address], [place] [address]. All rights reserved. This information is not intended as a substitute for professional medical care. Always follow your healthcare professional's instructions.        Understanding Carbohydrates, Fats, and Protein  Food is a source of fuel and nourishment for your body. Its also a source of pleasure. Having diabetes doesnt mean you have to eat special foods or give up desserts. Instead, your dietitian can show you how to plan meals to suit your body. To start, learn how different foods affect blood sugar.  Carbohydrates  Carbohydrates are the main source of fuel for the body. Carbohydrates raise blood sugar. Many people think carbohydrates are only found in pasta or bread. But carbohydrates are actually in many kinds of foods:  · Sugars occur naturally in foods such as fruit, milk, honey, and molasses. Sugars can also be added to many foods, from cereals and yogurt to candy and desserts. Sugars raise blood sugar.  · Starches are found in bread, cereals, pasta, and dried beans. Theyre also found in corn, peas, potatoes, yam, acorn squash, and butternut squash. Starches also raise blood sugar.   · Fiber is found in foods such as vegetables,  fruits, beans, and whole grains. Unlike other carbs, fiber isnt digested or absorbed. So it doesnt raise blood sugar. In fact, fiber can help keep blood sugar from rising too fast. It also helps keep blood cholesterol at a healthy level.  Did you know?  Even though carbohydrates raise blood sugar, its best to have some in every meal. They are an important part of a healthy diet.   Fat  Fat is an energy source that can be stored until needed. Fat does not raise blood sugar. However, it can raise blood cholesterol, increasing the risk of heart disease. Fat is also high in calories, which can cause weight gain. Not all types of fat are the same.  More Healthy:  · Monounsaturated fats are mostly found in vegetable oils, such as olive, canola, and peanut oils. They are also found in avocados and some nuts. Monounsaturated fats are healthy for your heart. Thats because they lower LDL (unhealthy) cholesterol.  · Polyunsaturated fats are mostly found in vegetable oils, such as corn, safflower, and soybean oils. They are also found in some seeds, nuts, and fish. Polyunsaturated fats lower LDL (unhealthy) cholesterol. So, choosing them instead of saturated fats is healthy for your heart. Certain unsaturated fats can help lower triglycerides.   Less Healthy:  · Saturated fats are found in animal products, such as meat, poultry, whole milk, lard, and butter. Saturated fats raise LDL cholesterol and are not healthy for your heart.  · Hydrogenated oils and trans fats are formed when vegetable oils are processed into solid fats. They are found in many processed foods. Hydrogenated oils and trans fats raise LDL cholesterol and lower HDL (healthy) cholesterol. They are not healthy for your heart.  Protein  Protein helps the body build and repair muscle and other tissue. Protein has little or no effect on blood sugar. However, many foods that contain protein also contain saturated fat. By choosing low-fat protein sources, you can  get the benefits of protein without the extra fat:  · Plant protein is found in dry beans and peas, nuts, and soy products, such as tofu and soymilk. These sources tend to be cholesterol-free and low in saturated fat.  · Animal protein is found in fish, poultry, meat, cheese, milk, and eggs. These contain cholesterol and can be high in saturated fat. Aim for lean, lower-fat choices.  Date Last Reviewed: 3/1/2016  © 3003-3595 [employer]. [address] [place] [address], [place] [address]. All rights reserved. This information is not intended as a substitute for professional medical care. Always follow your healthcare professional's instructions.        Healthy Meals for Diabetes     A healthcare provider will help you develop a meal plan that fits your needs.   Ask your healthcare team to help you make a meal plan that fits your needs. Your meal plan tells you when to eat your meals and snacks, what kinds of foods to eat, and how much of each food to eat. You dont have to give up all the foods you like. But you do need to follow some guidelines.  Choose healthy carbohydrates  Starches, sugars, and fiber are all types of carbohydrates. Fiber can help lower your cholesterol and triglycerides. Fiber is also healthy for your heart. You should have 20 to 35 grams of total fiber each day. Fiber-rich foods include:  · Whole-grain breads and cereals  · Bulgur wheat  · Brown rice     · Whole-wheat pasta  · Fruits and vegetables  · Dry beans, and peas   Keep track of the amount of carbohydrates you eat. This can help you keep the right balance of physical activity and medicine. The amount of carbohydrates needed will vary for each person. It depends on many things such as your health, the medicines you take, and how active you are. Your healthcare team will help you figure out the right amount of carbohydrates for you. You may start with around 45 to 60 grams of carbohydrates per meal, depending on your  situation.   Here are some examples of foods containing about 15 grams of carbohydrates (1 serving of carbohydrates):  · 1/2 cup of canned or frozen fruit  · A small piece of fresh fruit (4 ounces)  · 1 slice of bread  · 1/2 cup of oatmeal  · 1/3 cup of rice  · 4 to 6 crackers  · 1/2 English muffin  · 1/2 cup of black beans  · 1/4 of a large baked potato (3 ounces)  · 2/3 cup of plain fat-free yogurt  · 1 cup of soup  · 1/2 cup of casserole  · 6 chicken nuggets  · 2-inch-square brownie or cake without frosting  · 2 small cookies  · 1/2 cup of ice cream or sherbet  Choose healthy protein foods  Eating protein that is low in fat can help you control your weight. It also helps keep your heart healthy. Low-fat protein foods include:  · Fish  · Plant proteins, such as dry beans and peas, nuts, and soy products like tofu and soymilk  · Lean meat with all visible fat removed  · Poultry with the skin removed  · Low-fat or nonfat milk, cheese, and yogurt  Limit unhealthy fats and sugar  Saturated and trans fats are unhealthy for your heart. They raise LDL (bad) cholesterol. Fat is also high in calories, so it can make you gain weight. To cut down on unhealthy fats and sugar, limit these foods:  · Butter or margarine  · Palm and palm kernel oils and coconut oil  · Cream  · Cheese  · Baig  · Lunch meats     · Ice cream  · Sweet bakery goods such as pies, muffins, and donuts  · Jams and jellies  · Candy bars  · Regular sodas   How much to eat  The amount of food you eat affects your blood sugar. It also affects your weight. Your healthcare team will tell you how much of each type of food you should eat.  · Use measuring cups and spoons and a food scale to measure serving sizes.  · Learn what a correct serving size looks like on your plate. This will help when you are away from home and cant measure your servings.  · Eat only the number of servings given on your meal plan for each food. Dont take seconds.  · Learn to read  food labels. Be sure to look at serving size, total carbohydrates, fiber, calories, sugar, and saturated and trans fats. Look for healthier alternatives to foods that have added sugar.  · Plan ahead for parties so you can still have a good time without going overboard with unhealthy food choices. Set a good example yourself by bringing a healthy dish to pot angelique.   Choose healthy snacks  When it comes to snacks, we usually think about foods with added sugar and fats. But there are many other options for healthier snack choices. Here are a few snack ideas to choose from:  Snacks with less than 5 grams of carbohydrates  · 1 piece of string cheese  · 3 celery sticks plus 1 tablespoon of peanut butter  · 5 cherry tomatoes plus 1 tablespoon of ranch dressing  · 1 hard-boiled egg  · 1/4 cup of fresh blueberries  ·  5 baby carrots  · 1 cup of light popcorn  · 1/2 cup of sugar-free gelatin  · 15 almonds  Snacks with about 10 to 20 grams of carbohydrates  · 1/3 cup of hummus plus 1 cup of fresh cut nonstarchy vegetables (carrots, green peppers, broccoli, celery, or a combination)  · 1/2 cup of fresh or canned fruit plus 1/4 cup of cottage cheese  · 1/2 cup of tuna salad with 4 crackers  · 2 rice cakes and a tablespoon of peanut butter  · 1 small apple or orange  · 3 cups light popcorn  · 1/2 of a turkey sandwich (1 slice of whole-wheat bread, 2 ounces of turkey, and mustard)  Portion sizes are important to controlling your blood sugar and staying at a healthy weight. Stock up on healthy snack items so you always have them on hand.  When to eat  Your meal plan will likely include breakfast, lunch, dinner, and some snacks.  · Try to eat your meals and snacks at about the same times each day.  · Eat all your meals and snacks. Skipping a meal or snack can make your blood sugar drop too low. It can also cause you to eat too much at the next meal or snack. Then your blood sugar could get too high.  Date Last Reviewed: 7/1/2016  ©  0733-7996 [employer]. [address] [place] [address], [place] [address]. All rights reserved. This information is not intended as a substitute for professional medical care. Always follow your healthcare professional's instructions.        Diabetes: Meal Planning    You can help keep your blood sugar level in your target range by eating healthy foods. Your healthcare team can help you create a low-fat, nutritious meal plan. Take an active role in your diabetes management by following your meal plan and working with your healthcare team.  Make your meal plan  A meal plan gives guidelines for the types and amounts of food you should eat. The goal is to balance food and insulin (or other diabetes medications) so your blood sugars will be in your target range. Your dietitian will help you make a flexible meal plan that includes many foods that you like.  Watch serving sizes  Your meal plan will group foods by servings. To learn how much a serving is, start by measuring food portions at each meal. Soon youll know what a serving looks like on your plate. Ask your healthcare provider about how to balance servings of different foods.  Eat from all the food groups  The basis of a healthy meal plan is variety (eating lots of different foods). Choose lean meats, fresh fruits and vegetables, whole grains, and low-fat or nonfat dairy products. Eating a wide variety of foods provides the nutrients your body needs. It can also keep you from getting bored with your meal plan.  Learn about carbohydrates, fats, and protein  · Carbohydrates are starches, sugars, and fiber. They are found in many foods, including fruit, bread, pasta, milk, and sweets. Of all the foods you eat, carbohydrates have the most effect on your blood sugar. Your dietitian may teach you about carb counting, a way to figure out the number of carbohydrates in a meal.  · Fats have the most calories. They also have the most effect on your weight and your  risk of heart disease. When you have diabetes, its important to control your weight and protect your heart. Foods that are high in fat include whole milk, cheese, snack foods, and desserts.  · Protein is important for building and repairing muscles and bones. Choose low-fat protein sources, such as fish, egg whites, and skinless chicken.  Reduce liquid sugars  Extra calories from sodas, sports drinks, and fruit drinks make it hard to keep blood sugar in range. Cut as many liquid sugars from your meal plan as you can.  This includes most fruit juices, which are often high in natural or added sugar. Instead, drink plenty of water and other sugar-free beverages.  Eat less fat  If you need to lose weight, try to reduce the amount of fat in your diet. This can also help lower your cholesterol level to keep blood vessels healthier. Cut fat by using only small amounts of liquid oil for cooking. Read food labels carefully to avoid foods with unhealthy trans fats.  Timing your meals  When it comes to blood sugar control, when you eat is as important as what you eat. You may need to eat several small meals spaced evenly throughout the day to stay in your target range. So dont skip breakfast or wait until late in the day to get most of your calories. Doing so can cause your blood sugar to rise too high or fall too low.   Date Last Reviewed: 3/1/2016  © 3349-4921 [employer]. [address] [place] [address], [place] [address]. All rights reserved. This information is not intended as a substitute for professional medical care. Always follow your healthcare professional's instructions.

## 2018-01-12 NOTE — PROGRESS NOTES
"Subjective:       Patient ID: Jake Ortiz is a 77 y.o. male.    Chief Complaint: Abnormal labs    HPI   Abnormal labs  76yo male presents today for assessment after recent abnormal A1c measurement. Previous labs had been received per Cardiology with multiple elevated blood glucose levels. Initial A1c levels are 7.5. Patient has no previous known prediabetes or diabetes. Patient's wife accompanies him today and she reports he has been consuming fewer calories and a more balanced diet. He eats supper nightly at the [employer] on [address]. He does not exercise regularly. No significant weight fluctuations are reported. Patient's wife reports he has been sleeping poorly. She has appreciated some changes in his breathing at night and called 911 on one occasion based on her concerns. No known sleep breathing disorder. Patient admits to poor sleep patterns. He is followed by Oncology at [facility] for prostate cancer and reports stable assessment recently. He does admit to increased fatigue.     Review of Systems   Constitutional: Positive for fatigue. Negative for appetite change and unexpected weight change.   HENT: Negative for congestion, ear pain and sinus pressure.    Eyes: Negative for visual disturbance.   Respiratory: Negative for cough and shortness of breath.    Cardiovascular: Negative for chest pain, palpitations and leg swelling.   Gastrointestinal: Negative for abdominal pain, constipation and diarrhea.   Endocrine: Negative for polydipsia and polyuria.   Genitourinary: Negative for decreased urine volume and difficulty urinating.   Musculoskeletal: Negative for arthralgias and myalgias.   Skin: Negative for rash.   Neurological: Negative for dizziness, weakness and headaches.   Psychiatric/Behavioral: Positive for sleep disturbance. The patient is not nervous/anxious.        Objective:   /70   Pulse 73   Temp 97 °F (36.1 °C) (Temporal)   Resp 15   Ht 5' 8" (1.727 m)   Wt 102.6 kg (226 lb 3.1 " oz)   SpO2 98%   BMI 34.39 kg/m²   Physical Exam   Constitutional: He is oriented to person, place, and time. He appears well-developed. No distress.   Obese, non-toxic   HENT:   Head: Normocephalic and atraumatic.   Right Ear: Tympanic membrane, external ear and ear canal normal.   Left Ear: Tympanic membrane, external ear and ear canal normal.   Nose: Nose normal.   Mouth/Throat: Oropharynx is clear and moist.   Eyes: Conjunctivae and EOM are normal. Pupils are equal, round, and reactive to light.   Neck: Normal range of motion. Neck supple.   Cardiovascular: Normal rate, regular rhythm and normal heart sounds.    Pulmonary/Chest: Effort normal and breath sounds normal.   Abdominal: Soft. Bowel sounds are normal. There is no tenderness.   Musculoskeletal: He exhibits no edema.   Feet:   Right Foot:   Protective Sensation: 10 sites tested. 10 sites sensed.   Skin Integrity: Negative for ulcer, blister, callus or dry skin.   Left Foot:   Protective Sensation: 10 sites tested. 10 sites sensed.   Skin Integrity: Negative for ulcer, blister, callus or dry skin.   Neurological: He is alert and oriented to person, place, and time.   Skin: Skin is warm and dry. He is not diaphoretic.   Psychiatric: He has a normal mood and affect. His behavior is normal.       Assessment:       1. Diabetes mellitus type 2 in obese    2. Essential hypertension    3. Mixed hyperlipidemia    4. Atrial fibrillation, unspecified type    5. Arteriosclerosis of aorta    6. Coronary artery disease involving native coronary artery of native heart without angina pectoris    7. Granulomatous disease    8. Mixed restrictive and obstructive lung disease    9. Adenocarcinoma of prostate    10. Fatigue, unspecified type    11. Trouble in sleeping    12. Obesity (BMI 30-39.9)    13. Pacemaker        Plan:      Diabetes mellitus type 2 in obese  New diagnosis. Spent time today discussing DM including lifestyle and dietary measures. Reviewed target A1c  goals. Recommend initiation of once daily fasting blood glucose monitoring with maintenance of a log for review. Arrangements for diabetic education have been made and dietary handouts have been provided. An initial diabetic foot exam was performed- discussed regular self examination. Will schedule with Optometry for diabetic eye exam. Will plan to clinically reassess in 6 weeks.   -     Microalbumin/creatinine urine ratio  -     Hemoglobin A1c; Future; Expected date: 01/12/2018  -     Comprehensive metabolic panel; Future; Expected date: 01/12/2018  -     Ambulatory Referral to Diabetes Education  -     blood-glucose meter kit; Use as instructed  Dispense: 1 each; Refill: 0  -     lancets Misc; Check blood glucose levels once daily in the morning fasting  Dispense: 100 each; Refill: 11  -     blood sugar diagnostic Strp; Check blood glucose levels once daily in the AM fasting  Dispense: 100 strip; Refill: 11  -     Ambulatory Referral to Optometry    Essential hypertension  Stable. Continuation of current treatment is advised. Heart healthy diet is recommended. Target BP <140/90.    Mixed hyperlipidemia  Continue with current statin dosing in combination with diet. Annual lipid monitoring remains advised.    Atrial fibrillation, unspecified type  Rate controlled. On Xarelto routinely. Continue with current treatment and follow-up recommendations as per Cardiology.    Arteriosclerosis of aorta  BP and lipid control remains advised.    Coronary artery disease involving native coronary artery of native heart without angina pectoris  As above. Continue with current treatment and follow-up recommendations as per Cardiology.    Granulomatous disease  Proceed with updated assessment as per Pulmonology.    Mixed restrictive and obstructive lung disease  As above.    Adenocarcinoma of prostate  Continue with current treatment and follow-up recommendations as per Heme/onc.    Fatigue, unspecified type  -     CBC auto  differential; Future; Expected date: 01/12/2018  -     TSH; Future; Expected date: 01/12/2018    Trouble in sleeping  [place] sleepiness scale was administered with a score of 5/24. He will see Pulmonology for assessment and discuss whether sleep study is indicated.    Obesity (BMI 30-39.9)  Weight loss efforts remain encouraged through diet and lifestyle measures.    Pacemaker  As per Cardiology.    RTC in 6 weeks

## 2018-01-15 ENCOUNTER — TELEPHONE (OUTPATIENT)
Dept: FAMILY MEDICINE | Facility: CLINIC | Age: 78
End: 2018-01-15

## 2018-01-15 NOTE — TELEPHONE ENCOUNTER
Spoke with  Keyana Ramos on lab results . He  Called back to inform me on who is Oncologist was . Informed him that Dr. Holley would also like for him to see and Endocrinologist on . Got pt scheduled . Pt verbalized understanding . Lesley Walker

## 2018-01-19 DIAGNOSIS — J43.9 MIXED RESTRICTIVE AND OBSTRUCTIVE LUNG DISEASE: Primary | ICD-10-CM

## 2018-01-19 DIAGNOSIS — J98.4 MIXED RESTRICTIVE AND OBSTRUCTIVE LUNG DISEASE: Primary | ICD-10-CM

## 2018-01-23 ENCOUNTER — OFFICE VISIT (OUTPATIENT)
Dept: PULMONOLOGY | Facility: CLINIC | Age: 78
End: 2018-01-23
Payer: MEDICARE

## 2018-01-23 ENCOUNTER — PROCEDURE VISIT (OUTPATIENT)
Dept: PULMONOLOGY | Facility: CLINIC | Age: 78
End: 2018-01-23
Payer: MEDICARE

## 2018-01-23 ENCOUNTER — CLINICAL SUPPORT (OUTPATIENT)
Dept: DIABETES | Facility: CLINIC | Age: 78
End: 2018-01-23
Payer: MEDICARE

## 2018-01-23 VITALS
BODY MASS INDEX: 30.17 KG/M2 | SYSTOLIC BLOOD PRESSURE: 145 MMHG | HEART RATE: 71 BPM | WEIGHT: 199.06 LBS | OXYGEN SATURATION: 99 % | DIASTOLIC BLOOD PRESSURE: 83 MMHG | HEIGHT: 68 IN | RESPIRATION RATE: 18 BRPM

## 2018-01-23 VITALS — WEIGHT: 221.13 LBS | HEIGHT: 68 IN | BODY MASS INDEX: 33.51 KG/M2

## 2018-01-23 DIAGNOSIS — J43.9 MIXED RESTRICTIVE AND OBSTRUCTIVE LUNG DISEASE: ICD-10-CM

## 2018-01-23 DIAGNOSIS — J98.4 MIXED RESTRICTIVE AND OBSTRUCTIVE LUNG DISEASE: Primary | Chronic | ICD-10-CM

## 2018-01-23 DIAGNOSIS — E11.69 DIABETES MELLITUS TYPE 2 IN OBESE: Primary | ICD-10-CM

## 2018-01-23 DIAGNOSIS — E66.9 DIABETES MELLITUS TYPE 2 IN OBESE: Primary | ICD-10-CM

## 2018-01-23 DIAGNOSIS — J43.9 MIXED RESTRICTIVE AND OBSTRUCTIVE LUNG DISEASE: Primary | Chronic | ICD-10-CM

## 2018-01-23 DIAGNOSIS — J98.4 MIXED RESTRICTIVE AND OBSTRUCTIVE LUNG DISEASE: ICD-10-CM

## 2018-01-23 PROCEDURE — 99999 PR PBB SHADOW E&M-EST. PATIENT-LVL II: CPT | Mod: PBBFAC,,, | Performed by: DIETITIAN, REGISTERED

## 2018-01-23 PROCEDURE — 99499 UNLISTED E&M SERVICE: CPT | Mod: S$GLB,,, | Performed by: INTERNAL MEDICINE

## 2018-01-23 PROCEDURE — 99999 PR PBB SHADOW E&M-EST. PATIENT-LVL III: CPT | Mod: PBBFAC,,, | Performed by: INTERNAL MEDICINE

## 2018-01-23 PROCEDURE — 99214 OFFICE O/P EST MOD 30 MIN: CPT | Mod: 25,S$GLB,, | Performed by: INTERNAL MEDICINE

## 2018-01-23 PROCEDURE — G0108 DIAB MANAGE TRN  PER INDIV: HCPCS | Mod: S$GLB,,, | Performed by: DIETITIAN, REGISTERED

## 2018-01-23 PROCEDURE — 94060 EVALUATION OF WHEEZING: CPT | Mod: S$GLB,,, | Performed by: INTERNAL MEDICINE

## 2018-01-23 NOTE — LETTER
January 23, 2018      Chanda Brush MD  [id number] [address] [address]           [facility]  [address] [place] [id number]  Phone: [phone]  Fax: [phone]          Patient: Jake Ortiz   MR Number: [id number]   YOB: 1940   Date of Visit: 1/23/2018       Dear Dr. Chanda Brush:    Thank you for referring Jake Ortiz to me for evaluation. Attached you will find relevant portions of my assessment and plan of care.    If you have questions, please do not hesitate to call me. I look forward to following Jake Ortiz along with you.    Sincerely,    Jabari Singh MD    Enclosure  CC:  No Recipients    If you would like to receive this communication electronically, please contact [email or web address] or [phone] to request more information on [employer] access.    For providers and/or their staff who would like to refer a patient to [facility], please contact us through our one-stop-shop provider referral line, [facility], at [phone].    If you feel you have received this communication in error or would no longer like to receive these types of communications, please e-mail [email or web address]

## 2018-01-23 NOTE — PROGRESS NOTES
Subjective:      Patient ID: Jake Ortiz is a 77 y.o. male.  Patient Active Problem List   Diagnosis    Hypertension    Hyperlipemia    Obesity, Class I, BMI 30-34.9    Renal insufficiency    Anemia    CAD (coronary artery disease)    MI, old    S/P CABG x 3    DDD (degenerative disc disease), lumbar    Psoriasis    Rheumatoid factor positive    Multiple joint pain    Hyperuricemia    Mixed restrictive and obstructive lung disease    Granulomatous disease    Arteriosclerosis of aorta    Adenocarcinoma of prostate    Atrial fibrillation     Problem list has been reviewed.    Chief Complaint: Mixed resdtrictive and obstructive lung disease      Group Spirometric Classification Exacerbations / year mMRC CAT   Group A: Low risk; less symptom   GOLD 1- 2  < = 1 0 - 1 <10     FEV1 => FEV1 = 1.82 L ( 62% PREDICTED)         HPI       Patients reports NYHA II dyspnea    The patient does not have currently have symptoms / an exacerbation.       No cough, sputum, or hemoptysis and No recent change in breathing.       COPD QUESTIONNAIRE  How often do you cough?: (P) Almost never  How often do you have phlegm (mucus) in your chest?: (P) Never  How often does your chest feel tight?: (P) Never  When you walk up a hill or one flight of stairs, how often are you breathless?: (P) Some of the time  How often are you limited doing any activities at home?: (P) A little of the time  How often are you confident leaving the house despite your lung condition?: (P) All of the time  How often do you sleep soundly?: (P) Most of the time  How often do you have energy?: (P) Some of the time  Total score: (P) 9        A full  review of systems, past , family  and social histories was performed except as mentioned in the note above, these are non contributory to the main issues discussed today.       Previous Report Reviewed: lab reports, office notes and radiology reports     The following portions of the patient's history  were reviewed and updated as appropriate: He  has a past medical history of Abnormal PFT; Arthritis; Atrial fibrillation (10/19/2017); Back pain; Coronary artery disease; Hyperlipemia; Hypertension; Myocardial infarction; Obesity; Prostate cancer (2015); and Tobacco dependence.  He  has a past surgical history that includes Spine surgery; Tonsillectomy; and Coronary artery bypass graft (1987).  His family history includes Diabetes in his mother; Heart attack in his mother; Heart disease in his brother, father, and mother; Stroke in his father.  He  reports that he has quit smoking. He has a 30.00 pack-year smoking history. He has never used smokeless tobacco. He reports that he does not drink alcohol or use drugs.  He has a current medication list which includes the following prescription(s): aspirin, atorvastatin, blood sugar diagnostic, blood-glucose meter, cholecalciferol (vitamin d3), fluticasone, lancets, lisinopril-hydrochlorothiazide, multivitamin, rivaroxaban, and umeclidinium-vilanterol.  He is allergic to amiodarone..    Review of Systems   Constitutional: Negative for fever, chills and fatigue.   HENT: Negative for nosebleeds, postnasal drip, rhinorrhea, sore throat and congestion.    Respiratory: Positive for wheezing and dyspnea on extertion. Negative for cough, hemoptysis, choking, shortness of breath, orthopnea, previous hospitialization due to pulmonary problems and use of rescue inhaler.    Cardiovascular: Negative for chest pain and leg swelling.   Genitourinary: Negative for difficulty urinating and hematuria.   Musculoskeletal: Negative for arthralgias, back pain and myalgias.   Skin: Negative for rash.   Gastrointestinal: Positive for acid reflux. Negative for nausea, vomiting and abdominal pain.   Neurological: Negative for dizziness, syncope, light-headedness and headaches.   Hematological: Bleeds easily.   Psychiatric/Behavioral: The patient is nervous/anxious.         Objective:   BP (!)  "145/83   Pulse 71   Resp 18   Ht 5' 8" (1.727 m)   Wt 90.3 kg (199 lb 1.2 oz)   SpO2 99%   BMI 30.27 kg/m²   Body mass index is 30.27 kg/m².    Physical Exam   Constitutional: He is oriented to person, place, and time. He appears well-developed and well-nourished.   HENT:   Head: Normocephalic and atraumatic.   Eyes: Conjunctivae are normal. Pupils are equal, round, and reactive to light.   Neck: Normal range of motion. Neck supple.   Cardiovascular: Normal rate and regular rhythm.    Pulmonary/Chest: Effort normal and breath sounds normal.   Abdominal: Soft. Bowel sounds are normal.   Musculoskeletal: Normal range of motion.   Neurological: He is alert and oriented to person, place, and time.   Skin: Skin is warm and dry.   Psychiatric: He has a normal mood and affect. His behavior is normal.   Nursing note and vitals reviewed.      Personal Diagnostic Review  Pulmonary function tests: FEV1: 1.82  (62 % predicted), FVC:  2.98 (73 % predicted), FEV1/FVC:  61%      10/19/17 ;There is stable cardiomegaly.  Aorta is mildly tortuous and calcified.  Median sternotomy wires and left-sided cardiac pacer in place.  Lungs hypoinflated but grossly clear without confluent infiltrate or effusion.  No acute osseous abnormality.    Assessment:     1. Mixed restrictive and obstructive lung disease Active     Outpatient Encounter Prescriptions as of 1/23/2018   Medication Sig Dispense Refill    aspirin 81 MG Chew       atorvastatin (LIPITOR) 40 MG tablet Take 40 mg by mouth once daily.      blood sugar diagnostic Strp Check blood glucose levels once daily in the AM fasting 100 strip 11    blood-glucose meter kit Use as instructed 1 each 0    cholecalciferol, vitamin D3, (VITAMIN D3) 1,000 unit capsule Take 5,000 Units by mouth once daily.      fluticasone (FLONASE) 50 mcg/actuation nasal spray SHAKE LIQUID AND USE 2 SPRAYS IN EACH NOSTRIL EVERY DAY 1 Bottle 5    lancets Misc Check blood glucose levels once daily in the " morning fasting 100 each 11    lisinopril-hydrochlorothiazide (PRINZIDE,ZESTORETIC) 20-25 mg Tab Take 1 tablet by mouth once daily.      multivitamin capsule Take 1 capsule by mouth once daily.      rivaroxaban (XARELTO) 20 mg Tab Take 20 mg by mouth.      umeclidinium-vilanterol (ANORO ELLIPTA) 62.5-25 mcg/actuation DsDv Inhale 1 puff into the lungs once daily. 30 each 11     No facility-administered encounter medications on file as of 1/23/2018.      Orders Placed This Encounter   Procedures    Stress test, pulmonary     Standing Status:   Future     Standing Expiration Date:   3/5/2019    PULM - Arterial Blood Gases--in addition to PFT only     Standing Status:   Future     Standing Expiration Date:   3/5/2019       Plan:       Discussed diagnosis, its evaluation, treatment and usual course. All questions answered.      Mixed restrictive and obstructive lung disease  MIXED COPD ROS: No significant ongoing wheezing or shortness of breath,   New concerns: Progressive dyspnea   Exam: appears well, vitals normal, no respiratory distress, acyanotic, normal RR.   Assessment:  MIXED COPD stable.   Plan: START ANORO. Orders as documented in EMR. SIX MINUTE WALK TEST, ABG IN 1 MONTH.        TIME SPENT WITH PATIENT: Time spent: 30 minutes in face to face  discussion concerning diagnosis, prognosis, review of lab and test results, benefits of treatment as well as management of disease, counseling of patient and coordination of care between various health  care providers . Greater than half the time spent was used for coordination of care and counseling of patient.     Follow-up in about 1 month (around 2/23/2018) for Mixed obstructive and restrictive lung disease.

## 2018-01-23 NOTE — LETTER
January 23, 2018        Chanda Brush MD  [id number] [address] [address]             [facility]  [address] [place] [id number]  Phone: [phone]  Fax: [phone]   Patient: Jake Ortiz   MR Number: [id number]   YOB: 1940   Date of Visit: 1/23/2018       Dear Dr. Brush:    Thank you for referring Jake Ortiz to me for evaluation. Below are the relevant portions of my assessment and plan of care.      If you have questions, please do not hesitate to call me. I look forward to following Jake along with you.    Sincerely,      Cabrera Shah RD           CC  No Recipients

## 2018-01-23 NOTE — PATIENT INSTRUCTIONS
Lung Anatomy  Your lungs take air in to give your body oxygen, which the body needs to work. Your lungs, like all the tissues in your body, are made up of billions of tiny specialized cells. Old lung cells die and are replaced by new, identical lung cells. This natural process helps ensure healthy lungs.    Date Last Reviewed: 11/1/2016  © 4947-8139 [employer]. [address] [place] [address], [place] [address]. All rights reserved. This information is not intended as a substitute for professional medical care. Always follow your healthcare professional's instructions.

## 2018-01-23 NOTE — PROGRESS NOTES
Diabetes Education  Author: Cabrera Shah RD  Date: 1/23/2018    Diabetes Education Visit  Diabetes Education Record Assessment/Progress: Initial    Diabetes Type  Diabetes Type : Type II    Diabetes History  Diabetes Diagnosis: 0-1 year     Referring Provider: Chanda Brush*  77 y.o. male in clinic today for diabetes education. T2DM   DM MEDICATIONS:  None      Hemoglobin A1C   Date Value Ref Range Status   01/05/2018 7.5 (H) 4.0 - 5.6 % Final     Comment:     According to ADA guidelines, hemoglobin A1c <7.0% represents  optimal control in non-pregnant diabetic patients. Different  metrics may apply to specific patient populations.   Standards of Medical Care in Diabetes-2016.  For the purpose of screening for the presence of diabetes:  <5.7%     Consistent with the absence of diabetes  5.7-6.4%  Consistent with increasing risk for diabetes   (prediabetes)  >or=6.5%  Consistent with diabetes  Currently, no consensus exists for use of hemoglobin A1c  for diagnosis of diabetes for children.  This Hemoglobin A1c assay has significant interference with fetal   hemoglobin   (HbF). The results are invalid for patients with abnormal amounts of   HbF,   including those with known Hereditary Persistence   of Fetal Hemoglobin. Heterozygous hemoglobin variants (HbAS, HbAC,   HbAD, HbAE, HbA2) do not significantly interfere with this assay;   however, presence of multiple variants in a sample may impact the %   interference.         Nutrition  Meal Planning: skipping meals, drinks regular soda (Appetite has decreased over the past several months. )  What type of beverages do you drink?: water, milk, regular soda/tea, other (see comments) (drinking fruit drinks with added sugar)  Meal Plan 24 Hour Recall - Breakfast: bran flakes w/raisins, skim milk OR biscuit w/sausage and egg  Meal Plan 24 Hour Recall - Lunch: none; normally goes to [place] [facility] for meals  Meal Plan 24 Hour Recall - Dinner: Walk [employer]'s shrimp  salad w/lettuce, tortilla strips, tomato, balsamic vinaigrette  Meal Plan 24 Hour Recall - Snack: none OR popcorn    Monitoring   Self Monitoring : per pt recall -  fst:  145-160  Blood Glucose Logs: No    Exercise   Exercise Type: none  Frequency: Never    Current Diabetes Treatment   Current Treatment: Diet    Social History  Primary Support: Self, Spouse  Educational Level: High School  Occupation: retired  Smoking Status: Ex Smoker (quit smoking 30 yrs ago)  Alcohol Use: Never    Barriers to Change  Barriers to Change: None  Learning Challenges : None    Readiness to Learn   Readiness to Learn : Acceptance    Cultural Influences  Cultural Influences: None    Diabetes Education Assessment/Progress  Diabetes Disease Process (diabetes disease process and treatment options): Discussion, Individual Session, Demonstrates Understanding/Competency(verbalizes/demonstrates), Written Materials Provided  Nutrition (Incorporating nutritional management into one's lifestyle): Discussion, Individual Session, Demonstrates Understanding/Competency (verbalizes/demonstrates), Written Materials Provided  Physical Activity (incorporating physical activity into one's lifestyle): Discussion, Individual Session, Demonstrates Understanding/Competency (verbalizes/demonstrates), Written Materials Provided  Medications (states correct name, dose, onset, peak, duration, side effects & timing of meds): Discussion, Individual Session, Demonstrates Understanding/Competency(verbalizes/demonstrates), Written Materials Provided  Monitoring (monitoring blood glucose/other parameters & using results): Discussion, Individual Session, Demonstrates Understanding/Competency (verbalizes/demonstrates), Written Materials Provided  Acute Complications (preventing, detecting, and treating acute complications): Discussion, Individual Session, Demonstrates Understanding/Competency (verbalizes/demonstrates), Written Materials Provided  Chronic Complications  (preventing, detecting, and treating chronic complications): Discussion, Individual Session, Demonstrates Understanding/Competency (verbalizes/demonstrates)  Clinical (diabetes and other pertinent medical history): Discussion, Individual Session, Demonstrates Understanding/Competency (verbalizes/demonstrates)  Cognitive (knowledge of self-management skills, functional health literacy): Discussion, Individual Session, Demonstrates Understanding/Competency (verbalizes/demonstrates)  Psychosocial (emotional response to diabetes): Discussion, Individual Session, Demonstrates Understanding/Competency (verbalizes/demonstrates)  Behavioral (readiness for change, lifestyle practices, self-care behaviors): Discussion, Individual Session, Demonstrates Understanding/Competency (verbalizes/demonstrates)      Diabetes Care Plan/Intervention  Education Plan/Intervention: Individual Follow-Up DSMT    Pt's biggest change will be to avoid sugary beverages. Diet seems to have good portions without these beverages.  F/u in 3 months    Diabetes Meal Plan  Restrictions: Low Sodium, Restricted Carbohydrate, Low Fat  Calories: 1800, 1600 (ABW = 187 lbs (85 kg))  Carbohydrate Per Meal: 45-60g  Carbohydrate Per Snack : 15-20g    Education Units of Time   Time Spent: 60 min      Health Maintenance Topics with due status: Not Due       Topic Last Completion Date    TETANUS VACCINE 02/10/2015    PROSTATE-SPECIFIC ANTIGEN 10/19/2017    Colonoscopy 10/19/2017    Lipid Panel 10/24/2017    Hemoglobin A1c 01/05/2018    Foot Exam 01/12/2018     Health Maintenance Due   Topic Date Due    Eye Exam  06/20/1950

## 2018-01-23 NOTE — ASSESSMENT & PLAN NOTE
MIXED COPD ROS: No significant ongoing wheezing or shortness of breath,   New concerns: Progressive dyspnea   Exam: appears well, vitals normal, no respiratory distress, acyanotic, normal RR.   Assessment:  MIXED COPD stable.   Plan: START ANORO. Orders as documented in EMR. SIX MINUTE WALK TEST, ABG IN 1 MONTH.

## 2018-02-16 ENCOUNTER — LAB VISIT (OUTPATIENT)
Dept: LAB | Facility: HOSPITAL | Age: 78
End: 2018-02-16
Attending: FAMILY MEDICINE
Payer: MEDICARE

## 2018-02-16 DIAGNOSIS — E11.69 DIABETES MELLITUS TYPE 2 IN OBESE: ICD-10-CM

## 2018-02-16 DIAGNOSIS — E66.9 DIABETES MELLITUS TYPE 2 IN OBESE: ICD-10-CM

## 2018-02-16 LAB
ALBUMIN SERPL BCP-MCNC: 3.8 G/DL
ALP SERPL-CCNC: 75 U/L
ALT SERPL W/O P-5'-P-CCNC: 12 U/L
ANION GAP SERPL CALC-SCNC: 8 MMOL/L
AST SERPL-CCNC: 20 U/L
BILIRUB SERPL-MCNC: 0.4 MG/DL
BUN SERPL-MCNC: 18 MG/DL
CALCIUM SERPL-MCNC: 9.7 MG/DL
CHLORIDE SERPL-SCNC: 105 MMOL/L
CO2 SERPL-SCNC: 26 MMOL/L
CREAT SERPL-MCNC: 1.2 MG/DL
EST. GFR  (AFRICAN AMERICAN): >60 ML/MIN/1.73 M^2
EST. GFR  (NON AFRICAN AMERICAN): 58.8 ML/MIN/1.73 M^2
ESTIMATED AVG GLUCOSE: 143 MG/DL
GLUCOSE SERPL-MCNC: 144 MG/DL
HBA1C MFR BLD HPLC: 6.6 %
POTASSIUM SERPL-SCNC: 4.4 MMOL/L
PROT SERPL-MCNC: 7.7 G/DL
SODIUM SERPL-SCNC: 139 MMOL/L

## 2018-02-16 PROCEDURE — 83036 HEMOGLOBIN GLYCOSYLATED A1C: CPT

## 2018-02-16 PROCEDURE — 80053 COMPREHEN METABOLIC PANEL: CPT

## 2018-02-16 PROCEDURE — 36415 COLL VENOUS BLD VENIPUNCTURE: CPT | Mod: PO

## 2018-02-21 ENCOUNTER — OFFICE VISIT (OUTPATIENT)
Dept: FAMILY MEDICINE | Facility: CLINIC | Age: 78
End: 2018-02-21
Payer: MEDICARE

## 2018-02-21 VITALS
WEIGHT: 208.56 LBS | OXYGEN SATURATION: 94 % | TEMPERATURE: 97 F | BODY MASS INDEX: 30.89 KG/M2 | SYSTOLIC BLOOD PRESSURE: 124 MMHG | DIASTOLIC BLOOD PRESSURE: 74 MMHG | HEIGHT: 69 IN

## 2018-02-21 DIAGNOSIS — J06.9 UPPER RESPIRATORY TRACT INFECTION, UNSPECIFIED TYPE: ICD-10-CM

## 2018-02-21 DIAGNOSIS — E66.9 DIABETES MELLITUS TYPE 2 IN OBESE: ICD-10-CM

## 2018-02-21 DIAGNOSIS — J98.4 MIXED RESTRICTIVE AND OBSTRUCTIVE LUNG DISEASE: ICD-10-CM

## 2018-02-21 DIAGNOSIS — E11.69 DIABETES MELLITUS TYPE 2 IN OBESE: ICD-10-CM

## 2018-02-21 DIAGNOSIS — J02.9 SORE THROAT: Primary | ICD-10-CM

## 2018-02-21 DIAGNOSIS — J43.9 MIXED RESTRICTIVE AND OBSTRUCTIVE LUNG DISEASE: ICD-10-CM

## 2018-02-21 LAB
CTP QC/QA: YES
S PYO RRNA THROAT QL PROBE: NEGATIVE

## 2018-02-21 PROCEDURE — 87880 STREP A ASSAY W/OPTIC: CPT | Mod: QW,S$GLB,, | Performed by: FAMILY MEDICINE

## 2018-02-21 PROCEDURE — 99213 OFFICE O/P EST LOW 20 MIN: CPT | Mod: 25,S$GLB,, | Performed by: FAMILY MEDICINE

## 2018-02-21 PROCEDURE — 96372 THER/PROPH/DIAG INJ SC/IM: CPT | Mod: 59,S$GLB,, | Performed by: FAMILY MEDICINE

## 2018-02-21 PROCEDURE — 99999 PR PBB SHADOW E&M-EST. PATIENT-LVL III: CPT | Mod: PBBFAC,,, | Performed by: FAMILY MEDICINE

## 2018-02-21 PROCEDURE — 1159F MED LIST DOCD IN RCRD: CPT | Mod: S$GLB,,, | Performed by: FAMILY MEDICINE

## 2018-02-21 PROCEDURE — 99499 UNLISTED E&M SERVICE: CPT | Mod: S$GLB,,, | Performed by: FAMILY MEDICINE

## 2018-02-21 PROCEDURE — 3008F BODY MASS INDEX DOCD: CPT | Mod: S$GLB,,, | Performed by: FAMILY MEDICINE

## 2018-02-21 RX ORDER — BETAMETHASONE SODIUM PHOSPHATE AND BETAMETHASONE ACETATE 3; 3 MG/ML; MG/ML
6 INJECTION, SUSPENSION INTRA-ARTICULAR; INTRALESIONAL; INTRAMUSCULAR; SOFT TISSUE
Status: COMPLETED | OUTPATIENT
Start: 2018-02-21 | End: 2018-02-21

## 2018-02-21 RX ADMIN — BETAMETHASONE SODIUM PHOSPHATE AND BETAMETHASONE ACETATE 6 MG: 3; 3 INJECTION, SUSPENSION INTRA-ARTICULAR; INTRALESIONAL; INTRAMUSCULAR; SOFT TISSUE at 10:02

## 2018-02-21 NOTE — PROGRESS NOTES
Subjective:       Patient ID: Jake Ortiz is a 77 y.o. male.    Chief Complaint: Sore Throat      HPI Comments:       Current Outpatient Prescriptions:     aspirin 81 MG Chew, , Disp: , Rfl:     atorvastatin (LIPITOR) 40 MG tablet, Take 40 mg by mouth once daily., Disp: , Rfl:     blood sugar diagnostic Strp, Check blood glucose levels once daily in the AM fasting, Disp: 100 strip, Rfl: 11    blood-glucose meter kit, Use as instructed, Disp: 1 each, Rfl: 0    cholecalciferol, vitamin D3, (VITAMIN D3) 1,000 unit capsule, Take 5,000 Units by mouth once daily., Disp: , Rfl:     fluticasone (FLONASE) 50 mcg/actuation nasal spray, SHAKE LIQUID AND USE 2 SPRAYS IN EACH NOSTRIL EVERY DAY, Disp: 1 Bottle, Rfl: 5    lancets Misc, Check blood glucose levels once daily in the morning fasting, Disp: 100 each, Rfl: 11    lisinopril-hydrochlorothiazide (PRINZIDE,ZESTORETIC) 20-25 mg Tab, Take 1 tablet by mouth once daily., Disp: , Rfl:     multivitamin capsule, Take 1 capsule by mouth once daily., Disp: , Rfl:     rivaroxaban (XARELTO) 20 mg Tab, Take 20 mg by mouth., Disp: , Rfl:     umeclidinium-vilanterol (ANORO ELLIPTA) 62.5-25 mcg/actuation DsDv, Inhale 1 puff into the lungs once daily., Disp: 30 each, Rfl: 11    Current Facility-Administered Medications:     betamethasone acetate-betamethasone sodium phosphate injection 6 mg, 6 mg, Intramuscular, 1 time in Clinic/HOD, Byron Stevens MD    Generally doing well.  Diet controlled diabetic.  24-hour history of sore throat.  No URI symptoms or cough, but patient is concerned about frequently having prolonged years for infections and wants it treated early.  Asking for steroid shot and antibiotics.  No fever, chills, or body aches.  In general his respiratory symptoms are much improved on Anoro, which was started recently by his pulmonologist.  He says he now realizes that his dyspnea on exertion and sleeping problems were more severe than he had realized, now  "the both doing so much better on this medication.  Recent A1c was 6.6.  He is diet controlled.  Accu-Chek readings are consistent with this.      Review of Systems   Constitutional: Negative for activity change, appetite change and fever.   HENT: Positive for sore throat.    Respiratory: Negative for cough and shortness of breath.    Cardiovascular: Negative for chest pain.   Gastrointestinal: Negative for abdominal pain, diarrhea and nausea.   Genitourinary: Negative for difficulty urinating.   Musculoskeletal: Negative for arthralgias and myalgias.   Neurological: Negative for dizziness and headaches.       Objective:      Vitals:    02/21/18 1011   BP: 124/74   Temp: 97.2 °F (36.2 °C)   TempSrc: Tympanic   SpO2: (!) 94%   Weight: 94.6 kg (208 lb 8.9 oz)   Height: 5' 9" (1.753 m)     Physical Exam   Constitutional: He is oriented to person, place, and time. He appears well-developed and well-nourished. He does not appear ill. No distress.   HENT:   Head: Normocephalic.   Right Ear: Tympanic membrane, external ear and ear canal normal.   Left Ear: Tympanic membrane, external ear and ear canal normal.   Nose: No mucosal edema or rhinorrhea.   Mouth/Throat: Mucous membranes are normal. Posterior oropharyngeal edema present. No oropharyngeal exudate or posterior oropharyngeal erythema.   Neck: Neck supple. No thyromegaly present.   Cardiovascular: Normal rate, regular rhythm and normal heart sounds.    No murmur heard.  Pulmonary/Chest: Effort normal and breath sounds normal. He has no wheezes. He has no rales.   Abdominal: Soft. He exhibits no distension.   Musculoskeletal: He exhibits no edema.   Lymphadenopathy:     He has no cervical adenopathy.   Neurological: He is alert and oriented to person, place, and time.   Skin: Skin is warm and dry. He is not diaphoretic.   Psychiatric: He has a normal mood and affect. His behavior is normal. Judgment and thought content normal.   Nursing note and vitals reviewed.    "   Assessment:       1. Sore throat    2. Upper respiratory tract infection, unspecified type    3. Diabetes mellitus type 2 in obese    4. Mixed restrictive and obstructive lung disease        Plan:   Sore throat  Comments:  rapid strep neg. Celestone. Warm salt water gargles. Tylenol or NSAIDS  Orders:  -     POCT rapid strep A  -     Strep A culture, throat    Upper respiratory tract infection, unspecified type  Comments:  Celestone.  Mucinex DM as needed for upper  respiratory symptoms and cough  Orders:  -     betamethasone acetate-betamethasone sodium phosphate injection 6 mg; Inject 1 mL (6 mg total) into the muscle one time.    Diabetes mellitus type 2 in obese  Comments:  diet controlled. Pt wants steroid shot and will report back if BS > 250    Mixed restrictive and obstructive lung disease  Comments:  much improved on Anoro

## 2018-02-23 ENCOUNTER — LAB VISIT (OUTPATIENT)
Dept: LAB | Facility: HOSPITAL | Age: 78
End: 2018-02-23
Attending: FAMILY MEDICINE
Payer: MEDICARE

## 2018-02-23 ENCOUNTER — OFFICE VISIT (OUTPATIENT)
Dept: FAMILY MEDICINE | Facility: CLINIC | Age: 78
End: 2018-02-23
Payer: MEDICARE

## 2018-02-23 ENCOUNTER — TELEPHONE (OUTPATIENT)
Dept: FAMILY MEDICINE | Facility: CLINIC | Age: 78
End: 2018-02-23

## 2018-02-23 ENCOUNTER — PATIENT MESSAGE (OUTPATIENT)
Dept: FAMILY MEDICINE | Facility: CLINIC | Age: 78
End: 2018-02-23

## 2018-02-23 ENCOUNTER — PATIENT OUTREACH (OUTPATIENT)
Dept: ADMINISTRATIVE | Facility: HOSPITAL | Age: 78
End: 2018-02-23

## 2018-02-23 VITALS
DIASTOLIC BLOOD PRESSURE: 70 MMHG | HEIGHT: 69 IN | OXYGEN SATURATION: 96 % | TEMPERATURE: 98 F | HEART RATE: 73 BPM | BODY MASS INDEX: 31.17 KG/M2 | RESPIRATION RATE: 15 BRPM | WEIGHT: 210.44 LBS | SYSTOLIC BLOOD PRESSURE: 124 MMHG

## 2018-02-23 DIAGNOSIS — J98.4 MIXED RESTRICTIVE AND OBSTRUCTIVE LUNG DISEASE: ICD-10-CM

## 2018-02-23 DIAGNOSIS — E66.9 OBESITY (BMI 30-39.9): ICD-10-CM

## 2018-02-23 DIAGNOSIS — D64.9 ANEMIA, UNSPECIFIED TYPE: ICD-10-CM

## 2018-02-23 DIAGNOSIS — I10 ESSENTIAL HYPERTENSION: ICD-10-CM

## 2018-02-23 DIAGNOSIS — Z12.11 COLON CANCER SCREENING: ICD-10-CM

## 2018-02-23 DIAGNOSIS — I48.91 ATRIAL FIBRILLATION, UNSPECIFIED TYPE: ICD-10-CM

## 2018-02-23 DIAGNOSIS — E66.9 DIABETES MELLITUS TYPE 2 IN OBESE: Primary | ICD-10-CM

## 2018-02-23 DIAGNOSIS — E78.2 MIXED HYPERLIPIDEMIA: ICD-10-CM

## 2018-02-23 DIAGNOSIS — J43.9 MIXED RESTRICTIVE AND OBSTRUCTIVE LUNG DISEASE: ICD-10-CM

## 2018-02-23 DIAGNOSIS — D71 GRANULOMATOUS DISEASE: ICD-10-CM

## 2018-02-23 DIAGNOSIS — E11.69 DIABETES MELLITUS TYPE 2 IN OBESE: Primary | ICD-10-CM

## 2018-02-23 DIAGNOSIS — C61 ADENOCARCINOMA OF PROSTATE: ICD-10-CM

## 2018-02-23 LAB
BASOPHILS # BLD AUTO: 0.05 K/UL
BASOPHILS NFR BLD: 0.6 %
DIFFERENTIAL METHOD: ABNORMAL
EOSINOPHIL # BLD AUTO: 0.1 K/UL
EOSINOPHIL NFR BLD: 0.6 %
ERYTHROCYTE [DISTWIDTH] IN BLOOD BY AUTOMATED COUNT: 18.5 %
HCT VFR BLD AUTO: 31.7 %
HGB BLD-MCNC: 9.6 G/DL
IMM GRANULOCYTES # BLD AUTO: 0.02 K/UL
IMM GRANULOCYTES NFR BLD AUTO: 0.3 %
LYMPHOCYTES # BLD AUTO: 1.1 K/UL
LYMPHOCYTES NFR BLD: 14 %
MCH RBC QN AUTO: 24.7 PG
MCHC RBC AUTO-ENTMCNC: 30.3 G/DL
MCV RBC AUTO: 82 FL
MONOCYTES # BLD AUTO: 0.7 K/UL
MONOCYTES NFR BLD: 8.8 %
NEUTROPHILS # BLD AUTO: 5.9 K/UL
NEUTROPHILS NFR BLD: 75.7 %
NRBC BLD-RTO: 0 /100 WBC
PLATELET # BLD AUTO: 298 K/UL
PMV BLD AUTO: 10.1 FL
RBC # BLD AUTO: 3.89 M/UL
WBC # BLD AUTO: 7.73 K/UL

## 2018-02-23 PROCEDURE — 3008F BODY MASS INDEX DOCD: CPT | Mod: S$GLB,,, | Performed by: FAMILY MEDICINE

## 2018-02-23 PROCEDURE — 99499 UNLISTED E&M SERVICE: CPT | Mod: S$GLB,,, | Performed by: FAMILY MEDICINE

## 2018-02-23 PROCEDURE — 85025 COMPLETE CBC W/AUTO DIFF WBC: CPT

## 2018-02-23 PROCEDURE — 1159F MED LIST DOCD IN RCRD: CPT | Mod: S$GLB,,, | Performed by: FAMILY MEDICINE

## 2018-02-23 PROCEDURE — 99999 PR PBB SHADOW E&M-EST. PATIENT-LVL IV: CPT | Mod: PBBFAC,,, | Performed by: FAMILY MEDICINE

## 2018-02-23 PROCEDURE — 36415 COLL VENOUS BLD VENIPUNCTURE: CPT | Mod: PO

## 2018-02-23 PROCEDURE — 99214 OFFICE O/P EST MOD 30 MIN: CPT | Mod: S$GLB,,, | Performed by: FAMILY MEDICINE

## 2018-02-23 PROCEDURE — 1126F AMNT PAIN NOTED NONE PRSNT: CPT | Mod: S$GLB,,, | Performed by: FAMILY MEDICINE

## 2018-02-23 NOTE — TELEPHONE ENCOUNTER
----- Message from Shawnee Romano sent at 2/23/2018  3:30 PM CST -----  Contact: roger foster  returned call...[phone]

## 2018-02-23 NOTE — PROGRESS NOTES
Subjective:       Patient ID: Jake Ortiz is a 77 y.o. male.    Chief Complaint: Diabetes    Diabetes   He presents for his follow-up diabetic visit. He has type 2 diabetes mellitus. No MedicAlert identification noted. The initial diagnosis of diabetes was made 30 days ago. Pertinent negatives for hypoglycemia include no confusion, dizziness, headaches, hunger, mood changes, nervousness/anxiousness, pallor, seizures, sleepiness, speech difficulty, sweats or tremors. Associated symptoms include blurred vision, visual change and weight loss. Pertinent negatives for diabetes include no chest pain, no fatigue, no foot paresthesias, no foot ulcerations, no polydipsia, no polyphagia, no polyuria and no weakness. Pertinent negatives for hypoglycemia complications include no blackouts, no hospitalization, no nocturnal hypoglycemia, no required assistance and no required glucagon injection. Symptoms are improving. Diabetic complications include heart disease and impotence. Pertinent negatives for diabetic complications include no autonomic neuropathy, CVA, nephropathy, peripheral neuropathy, PVD or retinopathy. Risk factors for coronary artery disease include dyslipidemia, family history, hypertension, obesity, sedentary lifestyle, diabetes mellitus and male sex. Current diabetic treatment includes diet. He is compliant with treatment most of the time. His weight is stable. He is following a generally healthy diet. Meal planning includes avoidance of concentrated sweets. He has had a previous visit with a dietitian. He rarely participates in exercise. He monitors blood glucose at home 1-2 x per day. Blood glucose monitoring compliance is excellent. His home blood glucose trend is decreasing steadily. He does not see a podiatrist.Eye exam is not current.   Since initial assessment he has been evaluated by diabetic education. He has made considerable dietary changes and has succeeded in losing 16 pounds since his last  "visit. He is not currently on any medication for glycemic control. There are no symptoms of hyper or hypoglycemia. An eye exam is needed- he is not currently followed by Optometry. Labs in January 2018 were consistent with anemia. Patient is followed by Oncology annually- not able to recall the name of his specialist. No unusual bruising or bleeding is reported.    Review of Systems   Constitutional: Positive for weight loss. Negative for activity change, appetite change and fatigue.   Eyes: Positive for blurred vision.   Respiratory: Negative for cough and shortness of breath.    Cardiovascular: Negative for chest pain, palpitations and leg swelling.   Gastrointestinal: Negative for blood in stool, constipation and diarrhea.   Endocrine: Negative for polydipsia, polyphagia and polyuria.   Genitourinary: Positive for impotence. Negative for decreased urine volume and difficulty urinating.   Skin: Negative for pallor.   Neurological: Negative for dizziness, tremors, seizures, speech difficulty, weakness and headaches.   Hematological: Does not bruise/bleed easily.   Psychiatric/Behavioral: Negative for confusion. The patient is not nervous/anxious.        Objective:   /70   Pulse 73   Temp 97.8 °F (36.6 °C) (Temporal)   Resp 15   Ht 5' 9" (1.753 m)   Wt 95.5 kg (210 lb 6.9 oz)   SpO2 96%   BMI 31.07 kg/m²   Physical Exam   Constitutional: He is oriented to person, place, and time. He appears well-developed. No distress.   Obese, non-toxic   HENT:   Head: Normocephalic and atraumatic.   Right Ear: Tympanic membrane, external ear and ear canal normal.   Left Ear: Tympanic membrane, external ear and ear canal normal.   Nose: Nose normal.   Mouth/Throat: Oropharynx is clear and moist.   Eyes: Conjunctivae and EOM are normal. Pupils are equal, round, and reactive to light.   Neck: Normal range of motion. Neck supple.   Cardiovascular: Normal rate, regular rhythm and normal heart sounds.    Pulmonary/Chest: " Effort normal and breath sounds normal.   Abdominal: Soft. Bowel sounds are normal. There is no tenderness.   Musculoskeletal: He exhibits no edema.   Neurological: He is alert and oriented to person, place, and time.   Skin: Skin is warm and dry. He is not diaphoretic. No pallor.   Psychiatric: He has a normal mood and affect. His behavior is normal.       Assessment:       1. Diabetes mellitus type 2 in obese    2. Essential hypertension    3. 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 Atrial fibrillation, unspecified type    6. Anemia, unspecified type    7. Adenocarcinoma of prostate    8. Granulomatous disease    9. Obesity (BMI 30-39.9)        Plan:      Diabetes mellitus type 2 in obese  Stable. Reviewed home glucose log. He admits to some barriers with regard to eating habits- discussed recommendations by diabetic education/nutrition. Praised patient for weight loss to date. Will defer RX measures at this time. He has actually had updated A1c measurement before it was due with considerable improvement. Will monitor with updated labs in 3 months and he will maintain a glucose log for my review at the time of next assessment. In the interim he will be scheduled for diabetic eye exam. Questions were encouraged and answered today and he knows to contact me prior to his next scheduled visit if needed.  -     Hemoglobin A1c; Future; Expected date: 02/23/2018  -     Basic metabolic panel; Future; Expected date: 02/23/2018  -     Ambulatory Referral to Optometry    Essential hypertension  Stable. Continue with current treatment. Target BP goal remains<140/90. Heart healthy diet is advised. Intermittent home monitoring has been discussed.    Mixed hyperlipidemia  Continue with current statin dosing in combination with heart healthy diet.    Mixed restrictive and obstructive lung disease  Continue with current treatment and follow-up recommendations as per Pulmonology.    Atrial  fibrillation, unspecified type  Rate controlled. Continue with current treatment and follow-up recommendations as per Cardiology. Discussed importance of maintaining H&H.    Anemia, unspecified type  -     CBC auto differential; Future; Expected date: 02/23/2018    Adenocarcinoma of prostate  Followed by Dr. Deion Cueto. Will request records of most recent assessment to include labs.    Granulomatous disease  Followed by Pulmonology. Continue with current treatment and follow-up recommendations.    Obesity (BMI 30-39.9)  Weight loss efforts remain encouraged through diet and lifestyle measures.    Colon cancer screening  Discussed C scope- states he has never had colonoscopy and declines scheduling. Will proceed with FITKIT. In light of anemia he may need further assessment per GI.  -     Fecal Immunochemical Test (iFOBT); Future; Expected date: 02/23/2018

## 2018-02-23 NOTE — TELEPHONE ENCOUNTER
Please call patient's Oncologist to see if they have any recent CBC on file. We did labs recently and he has an anemia- not certain as to whether this is new. My last CBC on Mr. Ortiz was in 2014.

## 2018-02-27 ENCOUNTER — PROCEDURE VISIT (OUTPATIENT)
Dept: PULMONOLOGY | Facility: CLINIC | Age: 78
End: 2018-02-27
Payer: MEDICARE

## 2018-02-27 ENCOUNTER — OFFICE VISIT (OUTPATIENT)
Dept: OPHTHALMOLOGY | Facility: CLINIC | Age: 78
End: 2018-02-27
Payer: MEDICARE

## 2018-02-27 ENCOUNTER — OFFICE VISIT (OUTPATIENT)
Dept: PULMONOLOGY | Facility: CLINIC | Age: 78
End: 2018-02-27
Payer: MEDICARE

## 2018-02-27 VITALS
DIASTOLIC BLOOD PRESSURE: 60 MMHG | BODY MASS INDEX: 30.36 KG/M2 | HEART RATE: 75 BPM | WEIGHT: 205 LBS | HEIGHT: 69 IN | HEIGHT: 69 IN | RESPIRATION RATE: 18 BRPM | OXYGEN SATURATION: 99 % | BODY MASS INDEX: 30.36 KG/M2 | SYSTOLIC BLOOD PRESSURE: 134 MMHG | WEIGHT: 205 LBS

## 2018-02-27 DIAGNOSIS — H52.03 HYPEROPIA, BILATERAL: ICD-10-CM

## 2018-02-27 DIAGNOSIS — J43.9 MIXED RESTRICTIVE AND OBSTRUCTIVE LUNG DISEASE: Chronic | ICD-10-CM

## 2018-02-27 DIAGNOSIS — H25.13 CATARACT, NUCLEAR SCLEROTIC SENILE, BILATERAL: ICD-10-CM

## 2018-02-27 DIAGNOSIS — J98.4 MIXED RESTRICTIVE AND OBSTRUCTIVE LUNG DISEASE: Chronic | ICD-10-CM

## 2018-02-27 DIAGNOSIS — J43.9 MIXED RESTRICTIVE AND OBSTRUCTIVE LUNG DISEASE: Primary | Chronic | ICD-10-CM

## 2018-02-27 DIAGNOSIS — J98.4 MIXED RESTRICTIVE AND OBSTRUCTIVE LUNG DISEASE: Primary | Chronic | ICD-10-CM

## 2018-02-27 DIAGNOSIS — E11.9 DIABETES MELLITUS TYPE 2 WITHOUT RETINOPATHY: Primary | ICD-10-CM

## 2018-02-27 DIAGNOSIS — H52.4 BILATERAL PRESBYOPIA: ICD-10-CM

## 2018-02-27 LAB
ALLENS TEST: ABNORMAL
DELSYS: ABNORMAL
HCO3 UR-SCNC: 22.7 MMOL/L (ref 24–28)
PCO2 BLDA: 39.4 MMHG (ref 35–45)
PH SMN: 7.37 [PH] (ref 7.35–7.45)
PO2 BLDA: 77 MMHG (ref 80–100)
POC BE: -3 MMOL/L
POC SATURATED O2: 95 % (ref 95–100)
SAMPLE: ABNORMAL
SITE: ABNORMAL

## 2018-02-27 PROCEDURE — 94762 N-INVAS EAR/PLS OXIMTRY CONT: CPT | Mod: 59,S$GLB,, | Performed by: INTERNAL MEDICINE

## 2018-02-27 PROCEDURE — 92015 DETERMINE REFRACTIVE STATE: CPT | Mod: S$GLB,,, | Performed by: OPTOMETRIST

## 2018-02-27 PROCEDURE — 99999 PR PBB SHADOW E&M-EST. PATIENT-LVL III: CPT | Mod: PBBFAC,,, | Performed by: INTERNAL MEDICINE

## 2018-02-27 PROCEDURE — 99215 OFFICE O/P EST HI 40 MIN: CPT | Mod: 25,S$GLB,, | Performed by: INTERNAL MEDICINE

## 2018-02-27 PROCEDURE — 82803 BLOOD GASES ANY COMBINATION: CPT | Mod: S$GLB,,, | Performed by: INTERNAL MEDICINE

## 2018-02-27 PROCEDURE — 99999 PR PBB SHADOW E&M-EST. PATIENT-LVL I: CPT | Mod: PBBFAC,,, | Performed by: OPTOMETRIST

## 2018-02-27 PROCEDURE — 1159F MED LIST DOCD IN RCRD: CPT | Mod: S$GLB,,, | Performed by: INTERNAL MEDICINE

## 2018-02-27 PROCEDURE — 99499 UNLISTED E&M SERVICE: CPT | Mod: S$GLB,,, | Performed by: INTERNAL MEDICINE

## 2018-02-27 PROCEDURE — 36600 WITHDRAWAL OF ARTERIAL BLOOD: CPT | Mod: 59,S$GLB,, | Performed by: INTERNAL MEDICINE

## 2018-02-27 PROCEDURE — 94618 PULMONARY STRESS TESTING: CPT | Mod: S$GLB,,, | Performed by: INTERNAL MEDICINE

## 2018-02-27 PROCEDURE — 3008F BODY MASS INDEX DOCD: CPT | Mod: S$GLB,,, | Performed by: INTERNAL MEDICINE

## 2018-02-27 PROCEDURE — 92004 COMPRE OPH EXAM NEW PT 1/>: CPT | Mod: S$GLB,,, | Performed by: OPTOMETRIST

## 2018-02-27 PROCEDURE — 99499 UNLISTED E&M SERVICE: CPT | Mod: S$GLB,,, | Performed by: OPTOMETRIST

## 2018-02-27 NOTE — PATIENT INSTRUCTIONS
Lung Anatomy  Your lungs take air in to give your body oxygen, which the body needs to work. Your lungs, like all the tissues in your body, are made up of billions of tiny specialized cells. Old lung cells die and are replaced by new, identical lung cells. This natural process helps ensure healthy lungs.    Date Last Reviewed: 11/1/2016  © 8829-8511 [employer]. [address] [place] [address], [place] [address]. All rights reserved. This information is not intended as a substitute for professional medical care. Always follow your healthcare professional's instructions.

## 2018-02-27 NOTE — PROCEDURES
PHYSICIAN INTERPRETATION:    Results for orders placed or performed in visit on 02/27/18   ISTAT PROCEDURE   Result Value Ref Range    POC PH 7.367 7.35 - 7.45    POC PCO2 39.4 35 - 45 mmHg    POC PO2 77 (L) 80 - 100 mmHg    POC HCO3 22.7 (L) 24 - 28 mmol/L    POC BE -3 -2 to 2 mmol/L    POC SATURATED O2 95 95 - 100 %    Sample ARTERIAL     Site LR     Allens Test Pass     DelSys Room Air      MILD HYPOXEMIA WITH ELEVATED A - a GRADIENT

## 2018-02-27 NOTE — PROGRESS NOTES
Subjective:      Established patient    Patient ID: Jake Ortiz is a 77 y.o. male.  Patient Active Problem List   Diagnosis    Hypertension    Hyperlipemia    Obesity, Class I, BMI 30-34.9    Renal insufficiency    Anemia    CAD (coronary artery disease)    MI, old    S/P CABG x 3    DDD (degenerative disc disease), lumbar    Psoriasis    Rheumatoid factor positive    Multiple joint pain    Hyperuricemia    Mixed restrictive and obstructive lung disease    Granulomatous disease    Arteriosclerosis of aorta    Adenocarcinoma of prostate    Atrial fibrillation       Problem list has been reviewed.    Chief Complaint: MROLD      HPI    Group Spirometric Classification Exacerbations / year mMRC CAT   Group A: Low risk; less symptom   GOLD 1- 2  < = 1 0 - 1 <10      FEV1 => FEV1 = 1.82 L ( 62% PREDICTED)     PFT, 6 MWT and ABG reviewed with patient who expressed and voiced understanding.   All questions were answered to the patients satisfaction.    He reports a recent URI. He reports sore throat and mild productive cough. He was seen, evaluated by his PCP who prescribed IM glucocorticoid injection. He reports lingering symptoms.       COPD QUESTIONNAIRE  How often do you cough?: (P) Almost never  How often do you have phlegm (mucus) in your chest?: (P) Almost never  How often does your chest feel tight?: (P) Never  When you walk up a hill or one flight of stairs, how often are you breathless?: (P) A little of the time  How often are you limited doing any activities at home?: (P) A little of the time  How often are you confident leaving the house despite your lung condition?: (P) All of the time  How often do you sleep soundly?: (P) Most of the time  How often do you have energy?: (P) Most of the time  Total score: (P) 8       Patients reports NYHA II dyspnea    The patient does not have currently have symptoms / an exacerbation.     Immunization status reviewed and up to date.     His current  respiratory therapy regimen is ANOROwhich provides relief. He is  adherent with his regimen.      A full  review of systems, past , family  and social histories was performed except as mentioned in the note above, these are non contributory to the main issues discussed today.       Previous Report Reviewed: lab reports, office notes and radiology reports     The following portions of the patient's history were reviewed and updated as appropriate: He  has a past medical history of Abnormal PFT; Arthritis; Atrial fibrillation (10/19/2017); Back pain; Coronary artery disease; Hyperlipemia; Hypertension; Myocardial infarction; Obesity; Prostate cancer (2015); and Tobacco dependence.  He  has a past surgical history that includes Spine surgery; Tonsillectomy; and Coronary artery bypass graft (1987).  His family history includes Diabetes in his mother; Heart attack in his mother; Heart disease in his brother, father, and mother; Stroke in his father.  He  reports that he has quit smoking. He has a 30.00 pack-year smoking history. He has never used smokeless tobacco. He reports that he does not drink alcohol or use drugs.  He has a current medication list which includes the following prescription(s): aspirin, atorvastatin, blood sugar diagnostic, blood-glucose meter, cholecalciferol (vitamin d3), fluticasone, lancets, lisinopril-hydrochlorothiazide, multivitamin, rivaroxaban, and umeclidinium-vilanterol.  He is allergic to amiodarone..    Review of Systems   Constitutional: Negative for fever, chills and fatigue.   HENT: Negative for nosebleeds, postnasal drip, rhinorrhea, sore throat and congestion.    Respiratory: Positive for cough, sputum production and dyspnea on extertion. Negative for hemoptysis, choking, shortness of breath, orthopnea, previous hospitialization due to pulmonary problems and use of rescue inhaler.    Cardiovascular: Negative for chest pain and leg swelling.   Genitourinary: Negative for difficulty  "urinating and hematuria.   Musculoskeletal: Negative for arthralgias, back pain and myalgias.   Skin: Negative for rash.   Gastrointestinal: Positive for acid reflux. Negative for nausea, vomiting and abdominal pain.   Neurological: Negative for dizziness, syncope, light-headedness and headaches.   Hematological: Bleeds easily.   Psychiatric/Behavioral: The patient is nervous/anxious.         Objective:     /60   Pulse 75   Resp 18   Ht 5' 9" (1.753 m)   Wt 93 kg (205 lb)   SpO2 99%   BMI 30.27 kg/m²   Body mass index is 30.27 kg/m².     Physical Exam   Constitutional: He is oriented to person, place, and time. He appears well-developed and well-nourished.   HENT:   Head: Normocephalic and atraumatic.   Eyes: Conjunctivae are normal. Pupils are equal, round, and reactive to light.   Neck: Normal range of motion. Neck supple.   Cardiovascular: Normal rate and regular rhythm.    Pulmonary/Chest: Effort normal and breath sounds normal.   Abdominal: Soft. Bowel sounds are normal.   Musculoskeletal: Normal range of motion.   Neurological: He is alert and oriented to person, place, and time.   Skin: Skin is warm and dry.   Psychiatric: He has a normal mood and affect. His behavior is normal.   Nursing note and vitals reviewed.      Personal Diagnostic Review    ABG:       Results for orders placed or performed in visit on 02/27/18   ISTAT PROCEDURE   Result Value Ref Range    POC PH 7.367 7.35 - 7.45    POC PCO2 39.4 35 - 45 mmHg    POC PO2 77 (L) 80 - 100 mmHg    POC HCO3 22.7 (L) 24 - 28 mmol/L    POC BE -3 -2 to 2 mmol/L    POC SATURATED O2 95 95 - 100 %    Sample ARTERIAL     Site LR     Allens Test Pass     DelSys Room Air      MILD HYPOXEMIA WITH ELEVATED A - a GRADIENT       Six Minute Walk Test: Six minute walk distance is 365.76 / 477.86 meters (76.54 % predicted) with light dyspnea. During exercise, there was no significant desaturation while breathing room air. Blood pressure decreased significantly " and Heart rate increased significantly with walking. Hypertension was present prior to exercise. This may represent an abnormal cardiovascular response to exercise.  The patient did complete the study, walking 360 seconds of the 360 second test. No previous study performed. Based upon age and body mass index, exercise capacity is normal.   Peak VO2 during walking was 14.95 ml/min/kg [4.27METS       NGOZI Index for COPD Survival Prediction   Select Criteria:   FEV1 % Predicted After Bronchodialator     [] >= 65% (0 points)    [x] 50-64% (1 point)    [] 36-49% (2 points)    [] <= 35% (3 points)   6 Minute Walk Distance     [x] >= 350 Meters (0 points)    [] 250-349 Meters (1 point)    [] 150-249 Meters (2 points)    [] <= 149 Meters (3 points)   MMRC Dyspnea Scale (4 is worst)     [] MMRC 0: Dyspneic on strenuous excercise (0 points)    [x] MMRC 1: Dyspneic on walking a slight hill (0 points)    [] MMRC 2: Dyspneic on walking level ground; must stop occasionally due to breathlessness (1 point)    [] MMRC 3: Must stop for breathlessness after walking 100 yards or after a few minutes (2 points)    [] MMRC 4: Cannot leave house; breathless on dressing/undressing (3 points)   Body Mass Index     [x] > 21 (0 points)    [] <= 21 (1 point)   Results: Total Criteria Point Count:     Approximate 4 Year Survival Interpretation   0-2 Points:  [x] 80%   3-4 Points:  [] 67%   5-6 Points:  [] 57%   7-10 Points:[] 18%         References  1. Lamin BR, Sameera CG, Edu JM, et. al. The body-mass index, airflow obstruction, dyspnea and exercise capacity index in chronic obstructive pulmonary disease. N Engl J Med. 2004 Mar 4;350(10):1005-12.  2. Rodney DA, Flakito CK. Evaluation of clinical methods for rating dyspnea. Chest. 1988 Mar;93(3):580-6      Assessment / Plan:       Discussed diagnosis, its evaluation, treatment and usual course. All questions answered.    Problem List Items Addressed This Visit        Pulmonary    Mixed restrictive  and obstructive lung disease - Primary (Chronic)    Overview     PFT's in the past reveal moderate mixed obstruction with restriction         Current Assessment & Plan     MIXED COPD ROS: No significant ongoing wheezing or shortness of breath,   New concerns: Progressive dyspnea   Exam: appears well, vitals normal, no respiratory distress, acyanotic, normal RR.   Assessment:  MIXED COPD stable.   Plan:  CONTINUE  ANORO. OVERNIGHT OXIMETRY ON ROM AIR. PFT IN 3 MONTH. Orders as documented in EMR.          Relevant Orders    Pulse oximetry overnight    Complete PFT with bronchodilator            TIME SPENT WITH PATIENT: Time spent: 40 minutes in face to face  discussion concerning diagnosis, prognosis, review of lab and test results, benefits of treatment as well as management of disease, counseling of patient and coordination of care between various health  care providers . Greater than half the time spent was used for coordination of care and counseling of patient.       Follow-up for Mixed obstructive and restrictive lung disease.

## 2018-02-27 NOTE — PROGRESS NOTES
HPI     NIDDM exam. Patient states seems like sleep in eyes sometimes. Blurred   vision at distance sometimes. New patient last eye exam maybe 12 years.   Patient wears OTC readers.     Last edited by Meagan Walker on 2/27/2018  3:40 PM. (History)            Assessment /Plan     For exam results, see Encounter Report.    Diabetes mellitus type 2 without retinopathy    Cataract, nuclear sclerotic senile, bilateral    Hyperopia, bilateral    Bilateral presbyopia      No Background Diabetic Retinopathy    Moderate cataracts OU, not surgical    Dispense Final Rx for glasses.  RTC 1 year

## 2018-02-27 NOTE — ASSESSMENT & PLAN NOTE
MIXED COPD ROS: No significant ongoing wheezing or shortness of breath,   New concerns: Progressive dyspnea   Exam: appears well, vitals normal, no respiratory distress, acyanotic, normal RR.   Assessment:  MIXED COPD stable.   Plan:  CONTINUE  ANORO. OVERNIGHT OXIMETRY ON ROM AIR. PFT IN 3 MONTH. Orders as documented in EMR.

## 2018-02-27 NOTE — PROCEDURES
"[facility]  Six Minute Walk     SUMMARY     Ordering Provider: Samantha   Interpreting Provider: Samantha  Performing nurse/tech/RT: Ricardo Boothe  Diagnosis:  (Mixed restrictive and obstructive lung disease)  Height: 5' 9" (175.3 cm)  Weight: 93 kg (205 lb)  BMI (Calculated): 30.3   Patient Race:             Phase Oxygen Assessment Supplemental O2 Heart   Rate Blood Pressure Konrad Dyspnea Scale Rating   Resting 96 % Room Air 74 bpm 140/63 0   Exercise        Minute        1 96 % Room Air 74 bpm     2 95 % Room Air 91 bpm     3 95 % Room Air 99 bpm     4 97 % Room Air 102 bpm     5 97 % Room Air 107 bpm     6  97 % Room Air 104 bpm 129/61 3   Recovery        Minute        1 98 % Room Air 98 bpm     2 98 % Room Air 82 bpm     3 98 % Room Air 78 bpm     4 99 % Room Air 73 bpm 134/60 0     Six Minute Walk Summary  6MWT Status: completed without stopping  Patient Reported:  (Right Hip Pain)     Interpretation:  Did the patient stop or pause?: No           Total Time Walked (Calculated): 360 seconds  Final Partial Lap Distance (feet): 0 feet  Total Distance Meters (Calculated): 365.76 meters  Predicted Distance Meters (Calculated): 477.86 meters  Percentage of Predicted (Calculated): 76.54  Peak VO2 (Calculated): 14.95  Mets: 4.27  Has The Patient Had a Previous Six Minute Walk Test?: No       Previous 6MWT Results  Has The Patient Had a Previous Six Minute Walk Test?: No        PHYSICIAN INTERPRETATION:  Six minute walk distance is 365.76 / 477.86 meters (76.54 % predicted) with light dyspnea. During exercise, there was no significant desaturation while breathing room air. Blood pressure decreased significantly and Heart rate increased significantly with walking. Hypertension was present prior to exercise. This may represent an abnormal cardiovascular response to exercise. The patient did complete the study, walking 360 seconds of the 360 second test. No previous study performed. Based upon age " and body mass index, exercise capacity is normal.   Peak VO2 during walking was 14.95 ml/min/kg [4.27METS

## 2018-02-27 NOTE — LETTER
February 27, 2018      Chanda Brush [place]  [address] [address] [address]           [facility]  [address] [place] [id number]  Phone: [phone]  Fax: [phone]          Patient: Jake Ortiz   MR Number: [id number]   YOB: 1940   Date of Visit: 2/27/2018       Dear Dr. Chanda Brush:    Thank you for referring Jake Ortiz to me for evaluation. Attached you will find relevant portions of my assessment and plan of care.    If you have questions, please do not hesitate to call me. I look forward to following Jake Ortiz along with you.    Sincerely,    Kosta Carballo, OD    Enclosure  CC:  No Recipients    If you would like to receive this communication electronically, please contact [email or web address] or [phone] to request more information on [employer] Link access.    For providers and/or their staff who would like to refer a patient to [facility], please contact us through our one-stop-shop provider referral line, Claudia Mcbride, at [phone].    If you feel you have received this communication in error or would no longer like to receive these types of communications, please e-mail [email or web address]

## 2018-02-28 ENCOUNTER — OFFICE VISIT (OUTPATIENT)
Dept: FAMILY MEDICINE | Facility: CLINIC | Age: 78
End: 2018-02-28
Payer: MEDICARE

## 2018-02-28 ENCOUNTER — TELEPHONE (OUTPATIENT)
Dept: PULMONOLOGY | Facility: CLINIC | Age: 78
End: 2018-02-28

## 2018-02-28 VITALS
WEIGHT: 208.31 LBS | HEIGHT: 69 IN | SYSTOLIC BLOOD PRESSURE: 118 MMHG | BODY MASS INDEX: 30.85 KG/M2 | HEART RATE: 73 BPM | TEMPERATURE: 97 F | OXYGEN SATURATION: 97 % | DIASTOLIC BLOOD PRESSURE: 60 MMHG

## 2018-02-28 DIAGNOSIS — J98.4 MIXED RESTRICTIVE AND OBSTRUCTIVE LUNG DISEASE: ICD-10-CM

## 2018-02-28 DIAGNOSIS — G47.34 NOCTURNAL HYPOXEMIA: Primary | ICD-10-CM

## 2018-02-28 DIAGNOSIS — J06.9 UPPER RESPIRATORY TRACT INFECTION, UNSPECIFIED TYPE: Primary | ICD-10-CM

## 2018-02-28 DIAGNOSIS — J43.9 MIXED RESTRICTIVE AND OBSTRUCTIVE LUNG DISEASE: ICD-10-CM

## 2018-02-28 DIAGNOSIS — R19.7 DIARRHEA, UNSPECIFIED TYPE: ICD-10-CM

## 2018-02-28 PROCEDURE — 99213 OFFICE O/P EST LOW 20 MIN: CPT | Mod: S$GLB,,, | Performed by: FAMILY MEDICINE

## 2018-02-28 PROCEDURE — 1159F MED LIST DOCD IN RCRD: CPT | Mod: S$GLB,,, | Performed by: FAMILY MEDICINE

## 2018-02-28 PROCEDURE — 99499 UNLISTED E&M SERVICE: CPT | Mod: S$GLB,,, | Performed by: FAMILY MEDICINE

## 2018-02-28 PROCEDURE — 3008F BODY MASS INDEX DOCD: CPT | Mod: S$GLB,,, | Performed by: FAMILY MEDICINE

## 2018-02-28 PROCEDURE — 99999 PR PBB SHADOW E&M-EST. PATIENT-LVL III: CPT | Mod: PBBFAC,,, | Performed by: FAMILY MEDICINE

## 2018-02-28 RX ORDER — PROMETHAZINE HYDROCHLORIDE AND DEXTROMETHORPHAN HYDROBROMIDE 6.25; 15 MG/5ML; MG/5ML
5 SYRUP ORAL 3 TIMES DAILY PRN
Qty: 118 ML | Refills: 0 | Status: SHIPPED | OUTPATIENT
Start: 2018-02-28 | End: 2018-03-05

## 2018-02-28 NOTE — PROGRESS NOTES
Subjective:       Patient ID: Jake Ortiz is a 77 y.o. male.    Chief Complaint: Cough; Chest Congestion; and Nasal Congestion      HPI Comments:       Current Outpatient Prescriptions:     aspirin 81 MG Chew, , Disp: , Rfl:     atorvastatin (LIPITOR) 40 MG tablet, Take 40 mg by mouth once daily., Disp: , Rfl:     blood sugar diagnostic Strp, Check blood glucose levels once daily in the AM fasting, Disp: 100 strip, Rfl: 11    blood-glucose meter kit, Use as instructed, Disp: 1 each, Rfl: 0    cholecalciferol, vitamin D3, (VITAMIN D3) 1,000 unit capsule, Take 5,000 Units by mouth once daily., Disp: , Rfl:     fluticasone (FLONASE) 50 mcg/actuation nasal spray, SHAKE LIQUID AND USE 2 SPRAYS IN EACH NOSTRIL EVERY DAY, Disp: 1 Bottle, Rfl: 5    lancets Misc, Check blood glucose levels once daily in the morning fasting, Disp: 100 each, Rfl: 11    lisinopril-hydrochlorothiazide (PRINZIDE,ZESTORETIC) 20-25 mg Tab, Take 1 tablet by mouth once daily., Disp: , Rfl:     multivitamin capsule, Take 1 capsule by mouth once daily., Disp: , Rfl:     rivaroxaban (XARELTO) 20 mg Tab, Take 20 mg by mouth., Disp: , Rfl:     umeclidinium-vilanterol (ANORO ELLIPTA) 62.5-25 mcg/actuation DsDv, Inhale 1 puff into the lungs once daily., Disp: 30 each, Rfl: 11    promethazine-dextromethorphan (PROMETHAZINE-DM) 6.25-15 mg/5 mL Syrp, Take 5 mLs by mouth 3 (three) times daily as needed., Disp: 118 mL, Rfl: 0      Began coughing again yesterday.  Had a bout of diarrhea.  Much of the night coughing.  Everything feels a bit better today.  No nausea or vomiting, no shortness of breath, sore throat, ear pain, headaches or body ache, fever or chills, GI symptoms.  Some wheezing and clear rhinorrhea, congestion.  Taking Robitussin, Cheratussin and cough drops.  Saw pulmonologist yesterday and was doing well.  Sputum clear to green. Sore throat resolved promptly last week after steroid shot.       Cough   This is a recurrent problem. The  "current episode started yesterday. The problem has been gradually worsening. The problem occurs every few minutes. The cough is productive of sputum and productive of purulent sputum. Associated symptoms include nasal congestion, postnasal drip and rhinorrhea. Pertinent negatives include no chest pain, chills, ear congestion, ear pain, fever, headaches, heartburn, hemoptysis, myalgias, rash, sore throat, shortness of breath, sweats, weight loss or wheezing. Nothing aggravates the symptoms. He has tried OTC cough suppressant for the symptoms. The treatment provided no relief. His past medical history is significant for COPD. There is no history of asthma, bronchiectasis, bronchitis, emphysema, environmental allergies or pneumonia.     Review of Systems   Constitutional: Negative for chills, fever and weight loss.   HENT: Positive for postnasal drip and rhinorrhea. Negative for ear pain and sore throat.    Respiratory: Positive for cough. Negative for hemoptysis, shortness of breath and wheezing.    Cardiovascular: Negative for chest pain.   Gastrointestinal: Negative for heartburn.   Musculoskeletal: Negative for myalgias.   Skin: Negative for rash.   Allergic/Immunologic: Negative for environmental allergies.   Neurological: Negative for headaches.       Objective:      Vitals:    02/28/18 0813   BP: 118/60   Pulse: 73   Temp: 96.9 °F (36.1 °C)   TempSrc: Tympanic   SpO2: 97%   Weight: 94.5 kg (208 lb 5.4 oz)   Height: 5' 9" (1.753 m)     Physical Exam   Constitutional: He is oriented to person, place, and time. He appears well-developed and well-nourished. He does not appear ill. No distress.   HENT:   Head: Normocephalic.   Right Ear: External ear and ear canal normal. Tympanic membrane is injected.   Left Ear: Tympanic membrane, external ear and ear canal normal.   Nose: Mucosal edema and rhinorrhea present.   Mouth/Throat: Mucous membranes are normal. No oropharyngeal exudate, posterior oropharyngeal edema or " posterior oropharyngeal erythema.   Neck: Neck supple. No thyromegaly present.   Cardiovascular: Normal rate, regular rhythm and normal heart sounds.    No murmur heard.  Pulmonary/Chest: Effort normal and breath sounds normal. He has no wheezes. He has no rales.   Abdominal: Soft. He exhibits no distension.   Musculoskeletal: He exhibits no edema.   Lymphadenopathy:     He has no cervical adenopathy.   Neurological: He is alert and oriented to person, place, and time.   Skin: Skin is warm and dry. He is not diaphoretic.   Psychiatric: He has a normal mood and affect. His behavior is normal. Judgment and thought content normal.   Nursing note and vitals reviewed.      Assessment:       1. 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 Diarrhea, unspecified type        Plan:   Upper tract infection, unspecified type  Comments:  Continue current meds.  Promethazine DM as needed.  Call if worsening this week    Mixed restrictive and obstructive lung disease  Comments:  Recent evaluation by pulmonologist show stability    Diarrhea, unspecified type  Comments:  Imodium as needed    Other orders  -     promethazine-dextromethorphan (PROMETHAZINE-DM) 6.25-15 mg/5 mL Syrp; Take 5 mLs by mouth 3 (three) times daily as needed.  Dispense: 118 mL; Refill: 0

## 2018-02-28 NOTE — PROCEDURES
[facility]  [address] * [place] [address]  Telephone: [phone]  Test date: 18 Start: 18 23:45:48 Jake Ortiz  Doctor: Dr. Singh End: 18 06:58:52 [id number]  Oximetry: Summary Report  Comments: Room Air  Recording time: 07:13:04 Highest pulse: 180 Highest SpO2: 100%  Excluded samplin:18:08 Lowest pulse: 57 Lowest SpO2: 63%  Total valid samplin:54:56 Mean pulse: 63 Mean SpO2: 93.1%  1 S.D.: 5.0 1 S.D.: 4.7  Time with SpO2<90: 0:57:52, 13.9%  Time with SpO2<80: 0:19:24, 4.7%  Time with SpO2<70: 0:00:08, 0.0%  Time with SpO2<60: 0:00:00, 0.0%  Time with SpO2<88: 0:41:20, 10.0%  Time with SpO2 =>90: 5:57:04, 86.1%  Time with SpO2=>80 & <90: 0:38:28, 9.3%  Time with SpO2=>70 & <80: 0:19:16, 4.6%  Time with SpO2=>60 & <70: 0:00:08, 0.0%  The longest continuous time with saturation <=89 was 00:05:08, which started at  18 03:43:52.  A desaturation event was defined as a decrease of saturation by 4 or more.  2 events were excluded due to artifact.  There were 12 desaturation events over 3 minutes duration.  There were 75 desaturation events of less than 3 minutes duration during which:  The mean high was 96.5%. The mean low was 89.5%.  The number of these events that were:  > 0 & <10 seconds: 7 > 0 seconds: 75  =>10 & <20 seconds: 25 =>10 seconds: 68  =>20 & <30 seconds: 15 =>20 seconds: 43  =>30 & <40 seconds: 3 =>30 seconds: 28  =>40 & <50 seconds: 5 =>40 seconds: 25  =>50 & <60 seconds: 1 =>50 seconds: 20  =>60 seconds: 19 =>60 seconds: 19  The mean length of desaturation events that were >=10 sec & <=3 mins was: 39.8 sec.  Desaturation event index (events >=10 sec per sampled hour): 9.8  Desaturation event index (events >= 0 sec per sampled hour): 10.8  © 7643-8098 [employer] Oximetry version Standard [id number].  Oximeter: [id number] 920M Plus memory, 4 second resolution.      PHYSICIAN INTERPRETATION:    OVERNIGHT OXIMETRY  REPORT:    Dictated by: Jabari Carrero M.D.  Test date: 28  Dictated on: 2018      Comment: This test was performed on Room Air     A desaturation event was defined as a decrease of saturation by 4 or more.    REPORT SUMMARY  Total recording time: 07:13:04  Excluded samplin:18:08  Total valid samplin:54:56  High pulse: 180 /min  Low pulse: 57 /min    Mean pulse: 63/min    High SpO2: 100%    Low SpO2: 63%    Mean SpO2  93.1 %  Cumulative time with oxygen saturation less than 88% (TC88): 00:41:20 (10%)      CLINICAL INTERPRETATION   There is  significant nocturnal oxygen desaturation,  Clinical correlation is advised and  Recommend overnight polysomnography if clinically indicated    Medicare Criteria Comments:   Oximetry test results suggest the patient falls under Medicare Group 1 Criteria. ( Arterial oxygen saturation at or below 88% for at least 5 minutes taken during sleep)    Details about Medicare Group Criteria coverage can be found at http://www.cms.hhs.gov/manuals/downloads/

## 2018-02-28 NOTE — TELEPHONE ENCOUNTER
OVERNIGHT OXIMETRY REPORT:    Dictated by: Jabari Carrero M.D.  Test date: 28  Dictated on: 2018      Comment: This test was performed on Room Air     A desaturation event was defined as a decrease of saturation by   4 or more.    REPORT SUMMARY  Total recording time: 07:13:04  Excluded samplin:18:08  Total valid samplin:54:56  High pulse: 180 /min  Low pulse: 57 /min    Mean pulse: 63/min    High SpO2: 100%    Low SpO2: 63%    Mean SpO2  93.1 %  Cumulative time with oxygen saturation less than 88% (TC88):   00:41:20 (10%)      CLINICAL INTERPRETATION   There is  significant nocturnal oxygen desaturation,  Clinical   correlation is advised and  Recommend overnight polysomnography   if clinically indicated    Medicare Criteria Comments:   Oximetry test results suggest the patient falls under Medicare   Group 1 Criteria. ( Arterial oxygen saturation at or below 88%   for at least 5 minutes taken during sleep)    Assessment: Nocturnal Hypoxemia    Plan: Start nocturnal oxygen supplementation @ 2 L /min.    Ordered Please inform pateint

## 2018-03-02 ENCOUNTER — HOSPITAL ENCOUNTER (OUTPATIENT)
Dept: RADIOLOGY | Facility: HOSPITAL | Age: 78
Discharge: HOME | End: 2018-03-02
Attending: FAMILY MEDICINE
Payer: MEDICARE

## 2018-03-02 ENCOUNTER — OFFICE VISIT (OUTPATIENT)
Dept: FAMILY MEDICINE | Facility: CLINIC | Age: 78
End: 2018-03-02
Payer: MEDICARE

## 2018-03-02 VITALS
SYSTOLIC BLOOD PRESSURE: 108 MMHG | OXYGEN SATURATION: 98 % | WEIGHT: 211.56 LBS | HEIGHT: 69 IN | DIASTOLIC BLOOD PRESSURE: 60 MMHG | TEMPERATURE: 98 F | BODY MASS INDEX: 31.33 KG/M2 | HEART RATE: 70 BPM

## 2018-03-02 DIAGNOSIS — R06.00 DYSPNEA, UNSPECIFIED TYPE: ICD-10-CM

## 2018-03-02 DIAGNOSIS — J43.9 MIXED RESTRICTIVE AND OBSTRUCTIVE LUNG DISEASE: ICD-10-CM

## 2018-03-02 DIAGNOSIS — R05.9 COUGH: ICD-10-CM

## 2018-03-02 DIAGNOSIS — J98.4 MIXED RESTRICTIVE AND OBSTRUCTIVE LUNG DISEASE: ICD-10-CM

## 2018-03-02 DIAGNOSIS — E66.9 DIABETES MELLITUS TYPE 2 IN OBESE: ICD-10-CM

## 2018-03-02 DIAGNOSIS — J18.9 COMMUNITY ACQUIRED PNEUMONIA, UNSPECIFIED LATERALITY: Primary | ICD-10-CM

## 2018-03-02 DIAGNOSIS — E11.69 DIABETES MELLITUS TYPE 2 IN OBESE: ICD-10-CM

## 2018-03-02 PROCEDURE — 71046 X-RAY EXAM CHEST 2 VIEWS: CPT | Mod: TC,PO

## 2018-03-02 PROCEDURE — 99999 PR PBB SHADOW E&M-EST. PATIENT-LVL III: CPT | Mod: PBBFAC,,, | Performed by: FAMILY MEDICINE

## 2018-03-02 PROCEDURE — 99499 UNLISTED E&M SERVICE: CPT | Mod: S$GLB,,, | Performed by: FAMILY MEDICINE

## 2018-03-02 PROCEDURE — 3074F SYST BP LT 130 MM HG: CPT | Mod: S$GLB,,, | Performed by: FAMILY MEDICINE

## 2018-03-02 PROCEDURE — 71046 X-RAY EXAM CHEST 2 VIEWS: CPT | Mod: 26,,, | Performed by: RADIOLOGY

## 2018-03-02 PROCEDURE — 99214 OFFICE O/P EST MOD 30 MIN: CPT | Mod: S$GLB,,, | Performed by: FAMILY MEDICINE

## 2018-03-02 PROCEDURE — 3078F DIAST BP <80 MM HG: CPT | Mod: S$GLB,,, | Performed by: FAMILY MEDICINE

## 2018-03-02 RX ORDER — MOXIFLOXACIN HYDROCHLORIDE 400 MG/1
400 TABLET ORAL DAILY
Qty: 7 TABLET | Refills: 0 | Status: SHIPPED | OUTPATIENT
Start: 2018-03-02 | End: 2018-04-04 | Stop reason: ALTCHOICE

## 2018-03-02 RX ORDER — CODEINE PHOSPHATE AND GUAIFENESIN 10; 100 MG/5ML; MG/5ML
5 SOLUTION ORAL EVERY 6 HOURS PRN
Qty: 118 ML | Refills: 0 | Status: SHIPPED | OUTPATIENT
Start: 2018-03-02 | End: 2018-03-12

## 2018-03-02 RX ORDER — ALBUTEROL SULFATE 90 UG/1
2 AEROSOL, METERED RESPIRATORY (INHALATION) EVERY 6 HOURS PRN
Qty: 1 INHALER | Refills: 3 | Status: SHIPPED | OUTPATIENT
Start: 2018-03-02 | End: 2018-06-13

## 2018-03-02 RX ORDER — IPRATROPIUM BROMIDE AND ALBUTEROL SULFATE 2.5; .5 MG/3ML; MG/3ML
3 SOLUTION RESPIRATORY (INHALATION) EVERY 6 HOURS PRN
Qty: 1 BOX | Refills: 3 | Status: SHIPPED | OUTPATIENT
Start: 2018-03-02 | End: 2018-03-02 | Stop reason: SDUPTHER

## 2018-03-02 RX ORDER — ALBUTEROL SULFATE 0.83 MG/ML
2.5 SOLUTION RESPIRATORY (INHALATION) EVERY 6 HOURS PRN
Qty: 1 BOX | Refills: 3 | Status: SHIPPED | OUTPATIENT
Start: 2018-03-02 | End: 2018-03-22 | Stop reason: SDUPTHER

## 2018-03-02 RX ORDER — IPRATROPIUM BROMIDE AND ALBUTEROL SULFATE 2.5; .5 MG/3ML; MG/3ML
SOLUTION RESPIRATORY (INHALATION)
Qty: 1080 ML | Refills: 3 | Status: SHIPPED | OUTPATIENT
Start: 2018-03-02 | End: 2018-05-14

## 2018-03-02 RX ORDER — PREDNISONE 20 MG/1
TABLET ORAL
Qty: 10 TABLET | Refills: 0 | Status: SHIPPED | OUTPATIENT
Start: 2018-03-02 | End: 2018-03-05

## 2018-03-02 NOTE — PROGRESS NOTES
Subjective:       Patient ID: Jake Ortiz is a 77 y.o. male.    Chief Complaint: Cough and Wheezing      HPI Comments:       Current Outpatient Prescriptions:     aspirin 81 MG Chew, , Disp: , Rfl:     atorvastatin (LIPITOR) 40 MG tablet, Take 40 mg by mouth once daily., Disp: , Rfl:     blood sugar diagnostic Strp, Check blood glucose levels once daily in the AM fasting, Disp: 100 strip, Rfl: 11    blood-glucose meter kit, Use as instructed, Disp: 1 each, Rfl: 0    cholecalciferol, vitamin D3, (VITAMIN D3) 1,000 unit capsule, Take 5,000 Units by mouth once daily., Disp: , Rfl:     fluticasone (FLONASE) 50 mcg/actuation nasal spray, SHAKE LIQUID AND USE 2 SPRAYS IN EACH NOSTRIL EVERY DAY, Disp: 1 Bottle, Rfl: 5    lancets Misc, Check blood glucose levels once daily in the morning fasting, Disp: 100 each, Rfl: 11    lisinopril-hydrochlorothiazide (PRINZIDE,ZESTORETIC) 20-25 mg Tab, Take 1 tablet by mouth once daily., Disp: , Rfl:     multivitamin capsule, Take 1 capsule by mouth once daily., Disp: , Rfl:     promethazine-dextromethorphan (PROMETHAZINE-DM) 6.25-15 mg/5 mL Syrp, Take 5 mLs by mouth 3 (three) times daily as needed., Disp: 118 mL, Rfl: 0    rivaroxaban (XARELTO) 20 mg Tab, Take 20 mg by mouth., Disp: , Rfl:     umeclidinium-vilanterol (ANORO ELLIPTA) 62.5-25 mcg/actuation DsDv, Inhale 1 puff into the lungs once daily., Disp: 30 each, Rfl: 11    albuterol (PROVENTIL) 2.5 mg /3 mL (0.083 %) nebulizer solution, Take 3 mLs (2.5 mg total) by nebulization every 6 (six) hours as needed for Wheezing. Rescue, Disp: 1 Box, Rfl: 3    albuterol 90 mcg/actuation inhaler, Inhale 2 puffs into the lungs every 6 (six) hours as needed for Wheezing. Dispense with spacer., Disp: 1 Inhaler, Rfl: 3    albuterol-ipratropium 2.5mg-0.5mg/3mL (DUO-NEB) 0.5 mg-3 mg(2.5 mg base)/3 mL nebulizer solution, Take 3 mLs by nebulization every 6 (six) hours as needed for Wheezing. Rescue, Disp: 1 Box, Rfl: 3     guaifenesin-codeine 100-10 mg/5 ml (VIRTUSSIN AC)  mg/5 mL syrup, Take 5 mLs by mouth every 6 (six) hours as needed for Cough., Disp: 118 mL, Rfl: 0    moxifloxacin (AVELOX) 400 mg tablet, Take 1 tablet (400 mg total) by mouth once daily., Disp: 7 tablet, Rfl: 0    predniSONE (DELTASONE) 20 MG tablet, Take 2 tablets by mouth once a day for 3 days; then 1 tablet daily for 3 days;, Disp: 10 tablet, Rfl: 0      His cough is getting worse since I saw him 2 days ago.  Now productive of some green sputum.  Also short of breath, particularly when lying down.  More wheezing.  Needs refills on his albuterol inhaler and on his DuoNeb.  No other new symptoms, no fever chills, eating okay      Cough   This is a new problem. The current episode started in the past 7 days. The problem has been gradually worsening. The problem occurs every few minutes. The cough is productive of purulent sputum. Associated symptoms include shortness of breath and wheezing. Pertinent negatives include no chest pain, ear pain, fever, headaches, rash, rhinorrhea or sore throat. The symptoms are aggravated by lying down. He has tried a beta-agonist inhaler and prescription cough suppressant for the symptoms. The treatment provided mild relief.   Shortness of Breath   This is a recurrent problem. The current episode started in the past 7 days. The problem occurs constantly. The problem has been gradually worsening. The average episode lasts 1 hours. Associated symptoms include orthopnea, PND and wheezing. Pertinent negatives include no abdominal pain, chest pain, claudication, ear pain, fever, headaches, leg pain, leg swelling, neck pain, rash, rhinorrhea, sore throat, syncope or vomiting. The patient has no known risk factors for DVT/PE. The treatment provided no relief. There is no history of allergies, aspirin allergies or a recent surgery.     Review of Systems   Constitutional: Negative for fever.   HENT: Negative for ear pain, rhinorrhea  "and sore throat.    Respiratory: Positive for cough, shortness of breath and wheezing.    Cardiovascular: Positive for orthopnea and PND. Negative for chest pain, claudication, leg swelling and syncope.   Gastrointestinal: Negative for abdominal pain and vomiting.   Musculoskeletal: Negative for neck pain.   Skin: Negative for rash.   Neurological: Negative for headaches.   Psychiatric/Behavioral: Negative for confusion.       Objective:      Vitals:    03/02/18 0912   BP: 108/60   Pulse: 70   Temp: 97.6 °F (36.4 °C)   TempSrc: Tympanic   SpO2: 98%   Weight: 95.9 kg (211 lb 8.5 oz)   Height: 5' 9" (1.753 m)     Physical Exam   Constitutional: He is oriented to person, place, and time. He appears well-developed and well-nourished.  Non-toxic appearance. He appears ill. No distress.   HENT:   Head: Normocephalic.   Nose: Mucosal edema and rhinorrhea present.   Mouth/Throat: Mucous membranes are normal. Posterior oropharyngeal edema present. No oropharyngeal exudate or posterior oropharyngeal erythema.   Neck: Neck supple. No thyromegaly present.   Cardiovascular: Normal rate, regular rhythm and normal heart sounds.    No murmur heard.  Pulmonary/Chest: Effort normal. No accessory muscle usage. No tachypnea. No respiratory distress. He has no decreased breath sounds. He has wheezes. He has rhonchi. He has no rales.   Abdominal: Soft. He exhibits no distension.   Musculoskeletal: He exhibits no edema.   Lymphadenopathy:     He has no cervical adenopathy.   Neurological: He is alert and oriented to person, place, and time.   Skin: Skin is warm and dry. He is not diaphoretic.   Psychiatric: He has a normal mood and affect. His behavior is normal. Judgment and thought content normal.   Nursing note and vitals reviewed.      Assessment:       1. Community acquired pneumonia, unspecified laterality    2. Dyspnea, unspecified type    3. Mixed restrictive and obstructive lung disease    4. Diabetes mellitus type 2 in obese     "    Plan:   Community acquired pneumonia, unspecified laterality  Comments:  RLL infiltrate. RF Virtuss for shortterm use only.  review OK.    Avelox ×7 days  Orders:  -     X-Ray Chest PA And Lateral; Future; Expected date: 03/02/2018    Dyspnea, unspecified type  Comments:  No hypoxia.  Refills given on albuterol inhaler and DuoNeb nebulizer solution.  Prednisone 6 days.  Chest x-ray today    Mixed restrictive and obstructive lung disease    Diabetes mellitus type 2 in obese  Comments:  Monitor sugars closely and discontinue prednisone if blood sugar goes over 250    Other orders  -     moxifloxacin (AVELOX) 400 mg tablet; Take 1 tablet (400 mg total) by mouth once daily.  Dispense: 7 tablet; Refill: 0  -     predniSONE (DELTASONE) 20 MG tablet; Take 2 tablets by mouth once a day for 3 days; then 1 tablet daily for 3 days;  Dispense: 10 tablet; Refill: 0  -     albuterol 90 mcg/actuation inhaler; Inhale 2 puffs into the lungs every 6 (six) hours as needed for Wheezing. Dispense with spacer.  Dispense: 1 Inhaler; Refill: 3  -     guaifenesin-codeine 100-10 mg/5 ml (VIRTUSSIN AC)  mg/5 mL syrup; Take 5 mLs by mouth every 6 (six) hours as needed for Cough.  Dispense: 118 mL; Refill: 0  -     albuterol (PROVENTIL) 2.5 mg /3 mL (0.083 %) nebulizer solution; Take 3 mLs (2.5 mg total) by nebulization every 6 (six) hours as needed for Wheezing. Rescue  Dispense: 1 Box; Refill: 3  -     albuterol-ipratropium 2.5mg-0.5mg/3mL (DUO-NEB) 0.5 mg-3 mg(2.5 mg base)/3 mL nebulizer solution; Take 3 mLs by nebulization every 6 (six) hours as needed for Wheezing. Rescue  Dispense: 1 Box; Refill: 3

## 2018-03-05 ENCOUNTER — OFFICE VISIT (OUTPATIENT)
Dept: PULMONOLOGY | Facility: CLINIC | Age: 78
End: 2018-03-05
Payer: MEDICARE

## 2018-03-05 ENCOUNTER — LAB VISIT (OUTPATIENT)
Dept: LAB | Facility: HOSPITAL | Age: 78
End: 2018-03-05
Attending: NURSE PRACTITIONER
Payer: MEDICARE

## 2018-03-05 ENCOUNTER — OFFICE VISIT (OUTPATIENT)
Dept: FAMILY MEDICINE | Facility: CLINIC | Age: 78
End: 2018-03-05
Payer: MEDICARE

## 2018-03-05 ENCOUNTER — PATIENT OUTREACH (OUTPATIENT)
Dept: ADMINISTRATIVE | Facility: HOSPITAL | Age: 78
End: 2018-03-05

## 2018-03-05 ENCOUNTER — TELEPHONE (OUTPATIENT)
Dept: PULMONOLOGY | Facility: CLINIC | Age: 78
End: 2018-03-05

## 2018-03-05 VITALS
HEIGHT: 70 IN | HEART RATE: 68 BPM | DIASTOLIC BLOOD PRESSURE: 70 MMHG | BODY MASS INDEX: 30.64 KG/M2 | OXYGEN SATURATION: 95 % | WEIGHT: 214 LBS | RESPIRATION RATE: 20 BRPM | SYSTOLIC BLOOD PRESSURE: 126 MMHG

## 2018-03-05 VITALS
DIASTOLIC BLOOD PRESSURE: 70 MMHG | BODY MASS INDEX: 31.63 KG/M2 | TEMPERATURE: 97 F | SYSTOLIC BLOOD PRESSURE: 126 MMHG | WEIGHT: 214.19 LBS | HEART RATE: 78 BPM | OXYGEN SATURATION: 96 %

## 2018-03-05 DIAGNOSIS — I51.7 CARDIOMEGALY: ICD-10-CM

## 2018-03-05 DIAGNOSIS — J18.9 PNEUMONIA OF RIGHT LOWER LOBE DUE TO INFECTIOUS ORGANISM: Primary | ICD-10-CM

## 2018-03-05 DIAGNOSIS — J18.9 COMMUNITY ACQUIRED PNEUMONIA, UNSPECIFIED LATERALITY: Primary | ICD-10-CM

## 2018-03-05 DIAGNOSIS — R05.9 COUGH: ICD-10-CM

## 2018-03-05 DIAGNOSIS — I50.9 CONGESTIVE HEART FAILURE, UNSPECIFIED CONGESTIVE HEART FAILURE CHRONICITY, UNSPECIFIED CONGESTIVE HEART FAILURE TYPE: ICD-10-CM

## 2018-03-05 DIAGNOSIS — J44.1 COPD WITH ACUTE EXACERBATION: ICD-10-CM

## 2018-03-05 DIAGNOSIS — I25.10 CORONARY ARTERY DISEASE INVOLVING NATIVE CORONARY ARTERY OF NATIVE HEART WITHOUT ANGINA PECTORIS: ICD-10-CM

## 2018-03-05 DIAGNOSIS — K21.9 GASTROESOPHAGEAL REFLUX DISEASE, ESOPHAGITIS PRESENCE NOT SPECIFIED: ICD-10-CM

## 2018-03-05 DIAGNOSIS — J44.9 COPD, MODERATE: ICD-10-CM

## 2018-03-05 DIAGNOSIS — E66.9 DIABETES MELLITUS TYPE 2 IN OBESE: ICD-10-CM

## 2018-03-05 DIAGNOSIS — E11.69 DIABETES MELLITUS TYPE 2 IN OBESE: ICD-10-CM

## 2018-03-05 LAB — BNP SERPL-MCNC: 284 PG/ML

## 2018-03-05 PROCEDURE — 3078F DIAST BP <80 MM HG: CPT | Mod: S$GLB,,, | Performed by: NURSE PRACTITIONER

## 2018-03-05 PROCEDURE — 99214 OFFICE O/P EST MOD 30 MIN: CPT | Mod: 25,S$GLB,, | Performed by: NURSE PRACTITIONER

## 2018-03-05 PROCEDURE — 94640 AIRWAY INHALATION TREATMENT: CPT | Mod: 59,S$GLB,, | Performed by: INTERNAL MEDICINE

## 2018-03-05 PROCEDURE — 99999 PR PBB SHADOW E&M-EST. PATIENT-LVL III: CPT | Mod: PBBFAC,,, | Performed by: FAMILY MEDICINE

## 2018-03-05 PROCEDURE — 99999 PR PBB SHADOW E&M-EST. PATIENT-LVL IV: CPT | Mod: PBBFAC,,, | Performed by: NURSE PRACTITIONER

## 2018-03-05 PROCEDURE — 3074F SYST BP LT 130 MM HG: CPT | Mod: S$GLB,,, | Performed by: FAMILY MEDICINE

## 2018-03-05 PROCEDURE — 3078F DIAST BP <80 MM HG: CPT | Mod: S$GLB,,, | Performed by: FAMILY MEDICINE

## 2018-03-05 PROCEDURE — 36415 COLL VENOUS BLD VENIPUNCTURE: CPT

## 2018-03-05 PROCEDURE — 83880 ASSAY OF NATRIURETIC PEPTIDE: CPT

## 2018-03-05 PROCEDURE — 3074F SYST BP LT 130 MM HG: CPT | Mod: S$GLB,,, | Performed by: NURSE PRACTITIONER

## 2018-03-05 PROCEDURE — 99499 UNLISTED E&M SERVICE: CPT | Mod: S$GLB,,, | Performed by: NURSE PRACTITIONER

## 2018-03-05 PROCEDURE — 99214 OFFICE O/P EST MOD 30 MIN: CPT | Mod: S$GLB,,, | Performed by: FAMILY MEDICINE

## 2018-03-05 RX ORDER — FUROSEMIDE 40 MG/1
40 TABLET ORAL DAILY
Qty: 14 TABLET | Refills: 0 | Status: SHIPPED | OUTPATIENT
Start: 2018-03-05 | End: 2018-05-14

## 2018-03-05 RX ORDER — LANCETS
EACH MISCELLANEOUS
Qty: 100 EACH | Refills: 11 | Status: SHIPPED | OUTPATIENT
Start: 2018-03-05 | End: 2019-09-25 | Stop reason: SDUPTHER

## 2018-03-05 RX ORDER — PANTOPRAZOLE SODIUM 40 MG/1
40 TABLET, DELAYED RELEASE ORAL DAILY
Qty: 30 TABLET | Refills: 3 | Status: SHIPPED | OUTPATIENT
Start: 2018-03-05 | End: 2018-03-05 | Stop reason: SDUPTHER

## 2018-03-05 RX ORDER — ALBUTEROL SULFATE 0.83 MG/ML
2.5 SOLUTION RESPIRATORY (INHALATION)
Status: COMPLETED | OUTPATIENT
Start: 2018-03-05 | End: 2018-03-05

## 2018-03-05 RX ORDER — IPRATROPIUM BROMIDE AND ALBUTEROL SULFATE 2.5; .5 MG/3ML; MG/3ML
3 SOLUTION RESPIRATORY (INHALATION)
Status: COMPLETED | OUTPATIENT
Start: 2018-03-05 | End: 2018-03-05

## 2018-03-05 RX ORDER — BENZONATATE 200 MG/1
200 CAPSULE ORAL 3 TIMES DAILY PRN
Qty: 90 CAPSULE | Refills: 1 | Status: SHIPPED | OUTPATIENT
Start: 2018-03-05 | End: 2018-03-15

## 2018-03-05 RX ORDER — LANCETS
EACH MISCELLANEOUS
Qty: 100 EACH | Refills: 11 | Status: CANCELLED | OUTPATIENT
Start: 2018-03-05

## 2018-03-05 RX ORDER — PREDNISONE 20 MG/1
TABLET ORAL
Qty: 20 TABLET | Refills: 0 | Status: SHIPPED | OUTPATIENT
Start: 2018-03-05 | End: 2018-03-05 | Stop reason: ALTCHOICE

## 2018-03-05 RX ORDER — PANTOPRAZOLE SODIUM 40 MG/1
40 TABLET, DELAYED RELEASE ORAL DAILY
Qty: 90 TABLET | Refills: 1 | Status: SHIPPED | OUTPATIENT
Start: 2018-03-05 | End: 2018-05-14

## 2018-03-05 RX ORDER — FUROSEMIDE 40 MG/1
TABLET ORAL
Qty: 90 TABLET | Refills: 0 | OUTPATIENT
Start: 2018-03-05

## 2018-03-05 RX ADMIN — ALBUTEROL SULFATE 2.5 MG: 0.83 SOLUTION RESPIRATORY (INHALATION) at 12:03

## 2018-03-05 RX ADMIN — IPRATROPIUM BROMIDE AND ALBUTEROL SULFATE 3 ML: 2.5; .5 SOLUTION RESPIRATORY (INHALATION) at 12:03

## 2018-03-05 NOTE — ASSESSMENT & PLAN NOTE
seen on cxr most recent 3/2/2018 followed by Dr. Edy Menendez, cardiology, [facility]. 3/5/18  begin lasix 40 mg daily x 14 days

## 2018-03-05 NOTE — ASSESSMENT & PLAN NOTE
continue Anoro daily, was improved until rll infitrate, acute 3/2/2018. cont duo nebs during flare, then if needed up to 4 times a day. has albuterol if needed

## 2018-03-05 NOTE — PROGRESS NOTES
Subjective:       Patient ID: Jake Ortiz is a 77 y.o. male.    Chief Complaint: Cough (congestion)      HPI Comments:       Current Outpatient Prescriptions:     albuterol (PROVENTIL) 2.5 mg /3 mL (0.083 %) nebulizer solution, Take 3 mLs (2.5 mg total) by nebulization every 6 (six) hours as needed for Wheezing. Rescue, Disp: 1 Box, Rfl: 3    albuterol 90 mcg/actuation inhaler, Inhale 2 puffs into the lungs every 6 (six) hours as needed for Wheezing. Dispense with spacer., Disp: 1 Inhaler, Rfl: 3    albuterol-ipratropium 2.5mg-0.5mg/3mL (DUO-NEB) 0.5 mg-3 mg(2.5 mg base)/3 mL nebulizer solution, INHALE ONE VIAL VIA NEBULZIER EVERY 6 HOURS AS NEEDED FOR WHEEZING. RESCUE, Disp: 1080 mL, Rfl: 3    aspirin 81 MG Chew, , Disp: , Rfl:     atorvastatin (LIPITOR) 40 MG tablet, Take 40 mg by mouth once daily., Disp: , Rfl:     blood sugar diagnostic Strp, Check blood glucose levels once daily in the AM fasting, Disp: 100 strip, Rfl: 11    blood-glucose meter kit, Use as instructed, Disp: 1 each, Rfl: 0    cholecalciferol, vitamin D3, (VITAMIN D3) 1,000 unit capsule, Take 5,000 Units by mouth once daily., Disp: , Rfl:     fluticasone (FLONASE) 50 mcg/actuation nasal spray, SHAKE LIQUID AND USE 2 SPRAYS IN EACH NOSTRIL EVERY DAY, Disp: 1 Bottle, Rfl: 5    guaifenesin-codeine 100-10 mg/5 ml (VIRTUSSIN AC)  mg/5 mL syrup, Take 5 mLs by mouth every 6 (six) hours as needed for Cough., Disp: 118 mL, Rfl: 0    lancets Misc, Check blood glucose levels once daily in the morning fasting, Disp: 100 each, Rfl: 11    lisinopril-hydrochlorothiazide (PRINZIDE,ZESTORETIC) 20-25 mg Tab, Take 1 tablet by mouth once daily., Disp: , Rfl:     moxifloxacin (AVELOX) 400 mg tablet, Take 1 tablet (400 mg total) by mouth once daily., Disp: 7 tablet, Rfl: 0    multivitamin capsule, Take 1 capsule by mouth once daily., Disp: , Rfl:     predniSONE (DELTASONE) 20 MG tablet, Take 2 tablets by mouth once a day for 3 days; then 1  tablet daily for 3 days;, Disp: 10 tablet, Rfl: 0    promethazine-dextromethorphan (PROMETHAZINE-DM) 6.25-15 mg/5 mL Syrp, Take 5 mLs by mouth 3 (three) times daily as needed., Disp: 118 mL, Rfl: 0    rivaroxaban (XARELTO) 20 mg Tab, Take 20 mg by mouth., Disp: , Rfl:     umeclidinium-vilanterol (ANORO ELLIPTA) 62.5-25 mcg/actuation DsDv, Inhale 1 puff into the lungs once daily., Disp: 30 each, Rfl: 11    pantoprazole (PROTONIX) 40 MG tablet, Take 1 tablet (40 mg total) by mouth once daily., Disp: 90 tablet, Rfl: 1      This is our third visit in the last week for cough.  Treated 3 days ago for acquired pneumonia.  He also has mixed restrictive/obstructive pulmonary disease.  Started then on Avelox and prednisone.  Refills given on nebulizer meds.  Codeine-based cough syrup.  Coughing all weekend.  Still mostly nonproductive.  Wheezing and croupiness.  More shortness of breath.  Eating well.  No fevers.      Cough   This is a chronic problem. The current episode started 1 to 4 weeks ago. The problem has been gradually worsening. The problem occurs every few minutes. The cough is non-productive. Associated symptoms include shortness of breath. Pertinent negatives include no chest pain, ear pain, fever, headaches, hemoptysis, rash, rhinorrhea, sore throat or wheezing. His past medical history is significant for COPD and pneumonia. There is no history of asthma.   Shortness of Breath   This is a recurrent problem. The current episode started in the past 7 days. The problem occurs every few minutes. The problem has been waxing and waning. The average episode lasts 3 minutes. Associated symptoms include coryza, orthopnea and PND. Pertinent negatives include no abdominal pain, chest pain, claudication, ear pain, fever, headaches, hemoptysis, leg pain, leg swelling, neck pain, rash, rhinorrhea, sore throat, sputum production, swollen glands, syncope, vomiting or wheezing. The patient has no known risk factors for  DVT/PE. He has tried beta agonist inhalers for the symptoms. The treatment provided moderate relief. His past medical history is significant for CAD, chronic lung disease, COPD, a heart failure, PE and pneumonia. There is no history of allergies, aspirin allergies, asthma, bronchiolitis, DVT or a recent surgery.     Review of Systems   Constitutional: Negative for fever.   HENT: Negative for ear pain, rhinorrhea and sore throat.    Respiratory: Positive for cough and shortness of breath. Negative for hemoptysis, sputum production and wheezing.    Cardiovascular: Positive for orthopnea and PND. Negative for chest pain, claudication, leg swelling and syncope.   Gastrointestinal: Negative for abdominal pain and vomiting.   Musculoskeletal: Negative for neck pain.   Skin: Negative for rash.   Neurological: Negative for headaches.       Objective:      Vitals:    03/05/18 0855   BP: 126/70   Pulse: 78   Temp: 96.6 °F (35.9 °C)   TempSrc: Tympanic   SpO2: 96%   Weight: 97.1 kg (214 lb 2.8 oz)     Physical Exam   Constitutional: He is oriented to person, place, and time. He appears well-developed and well-nourished.  Non-toxic appearance. He appears ill. No distress.   HENT:   Head: Normocephalic.   Right Ear: Tympanic membrane, external ear and ear canal normal.   Left Ear: Tympanic membrane and ear canal normal.   Nose: Mucosal edema present. No rhinorrhea.   Mouth/Throat: Mucous membranes are normal. Posterior oropharyngeal edema present. No oropharyngeal exudate or posterior oropharyngeal erythema.   Neck: Neck supple. No thyromegaly present.   Cardiovascular: Normal rate, regular rhythm and normal heart sounds.    No murmur heard.  Pulmonary/Chest: Effort normal and breath sounds normal. He has no wheezes. He has no rales.   Abdominal: Soft. He exhibits no distension.   Musculoskeletal: He exhibits no edema.   Lymphadenopathy:     He has no cervical adenopathy.   Neurological: He is alert and oriented to person,  place, and time.   Skin: Skin is warm and dry. He is not diaphoretic.   Psychiatric: He has a normal mood and affect. His behavior is normal. Judgment and thought content normal.   Nursing note and vitals reviewed.      Assessment:       1. Community acquired pneumonia, unspecified laterality        Plan:   Community acquired pneumonia, unspecified laterality  Comments:  cough and wheezing worsening, despite antibiotics, steroids, nebulizer treatments.  No hypoxia.  Pulmonology f/u today. Repeat CXR discussed (6 weeks)

## 2018-03-05 NOTE — TELEPHONE ENCOUNTER
Patient called regarding prednisone, patient instructed to continue present dosage of Prednisone due to Dm, patient stated understanding

## 2018-03-08 ENCOUNTER — PATIENT MESSAGE (OUTPATIENT)
Dept: PULMONOLOGY | Facility: CLINIC | Age: 78
End: 2018-03-08

## 2018-03-22 RX ORDER — ALBUTEROL SULFATE 0.83 MG/ML
2.5 SOLUTION RESPIRATORY (INHALATION) EVERY 6 HOURS PRN
Qty: 1 BOX | Refills: 3 | Status: SHIPPED | OUTPATIENT
Start: 2018-03-22 | End: 2018-05-14

## 2018-04-04 ENCOUNTER — OFFICE VISIT (OUTPATIENT)
Dept: PULMONOLOGY | Facility: CLINIC | Age: 78
End: 2018-04-04
Payer: MEDICARE

## 2018-04-04 ENCOUNTER — HOSPITAL ENCOUNTER (OUTPATIENT)
Dept: RADIOLOGY | Facility: HOSPITAL | Age: 78
Discharge: HOME | End: 2018-04-04
Attending: NURSE PRACTITIONER
Payer: MEDICARE

## 2018-04-04 VITALS
WEIGHT: 200.38 LBS | HEART RATE: 72 BPM | OXYGEN SATURATION: 96 % | BODY MASS INDEX: 28.69 KG/M2 | DIASTOLIC BLOOD PRESSURE: 64 MMHG | SYSTOLIC BLOOD PRESSURE: 110 MMHG | RESPIRATION RATE: 18 BRPM | HEIGHT: 70 IN

## 2018-04-04 DIAGNOSIS — E66.9 OBESITY, CLASS I, BMI 30-34.9: ICD-10-CM

## 2018-04-04 DIAGNOSIS — G47.34 NOCTURNAL HYPOXEMIA: ICD-10-CM

## 2018-04-04 DIAGNOSIS — J44.9 COPD, MODERATE: ICD-10-CM

## 2018-04-04 DIAGNOSIS — J18.9 PNEUMONIA OF RIGHT LOWER LOBE DUE TO INFECTIOUS ORGANISM: ICD-10-CM

## 2018-04-04 DIAGNOSIS — G47.30 SLEEP-DISORDERED BREATHING: ICD-10-CM

## 2018-04-04 DIAGNOSIS — R91.8 OPACITY OF LUNG ON IMAGING STUDY: Primary | ICD-10-CM

## 2018-04-04 DIAGNOSIS — I51.7 CARDIOMEGALY: ICD-10-CM

## 2018-04-04 DIAGNOSIS — R05.9 COUGH: ICD-10-CM

## 2018-04-04 DIAGNOSIS — I50.9 CONGESTIVE HEART FAILURE, UNSPECIFIED CONGESTIVE HEART FAILURE CHRONICITY, UNSPECIFIED CONGESTIVE HEART FAILURE TYPE: ICD-10-CM

## 2018-04-04 PROCEDURE — 99999 PR PBB SHADOW E&M-EST. PATIENT-LVL V: CPT | Mod: PBBFAC,,, | Performed by: NURSE PRACTITIONER

## 2018-04-04 PROCEDURE — 99214 OFFICE O/P EST MOD 30 MIN: CPT | Mod: S$GLB,,, | Performed by: NURSE PRACTITIONER

## 2018-04-04 PROCEDURE — 99499 UNLISTED E&M SERVICE: CPT | Mod: S$PBB,,, | Performed by: NURSE PRACTITIONER

## 2018-04-04 PROCEDURE — 71046 X-RAY EXAM CHEST 2 VIEWS: CPT | Mod: TC

## 2018-04-04 PROCEDURE — 71046 X-RAY EXAM CHEST 2 VIEWS: CPT | Mod: 26,,, | Performed by: RADIOLOGY

## 2018-04-04 NOTE — PATIENT INSTRUCTIONS
Visiting a Sleep Clinic    Your provider has scheduled you for a sleep study.   You should be receiving a phone call from the sleep lab shortly after your study has been approved by your insurance. Please make sure you have your current phone numbers in the [facility] system. If you do not hear from anyone in the next 10 -14 business days, please call the sleep lab at [phone] to schedule your sleep study. The sleep studies are performed at [facility] seven nights a week.  When you are scheduling your sleep study, they will also make you a follow up appointment with your provider. This follow up appointment will be 10-14 days after your sleep study to review the results. If it is noted that you do have sleep apnea on your initial sleep study, you may receive a call back for a second night study with the CPAP before you come back to the office.   Are you worried about having a sleep study? Talk to your healthcare provider if you have any concerns. Learn what to expect at the sleep clinic and try to relax before you come.  Before your study  Your healthcare provider will tell you how to prepare. Ask if you should take your usual medicines. Also:  · Avoid napping.  · Avoid caffeine and alcohol.  · Take a shower and wash your hair (dont use hair conditioner, hair spray, and skin lotions).  · Eat dinner before you come to the sleep clinic. Pack a snack if you need one before bedtime.  · Bring what will make you comfortable, such as your pajamas, robe, slippers, hygiene items, and even your own pillow.  What you can expect  When you arrive at the sleep clinic, you will usually be checked in by a . A specialized sleep technologist will meet you in your room. Then you may change into your nightclothes. Small sensors are placed on your head and body with tape and gel. The sensors are then plugged into a machine that will monitor your sleep. If you need to use a restroom, the sensors can  be unplugged. A camera in your room will record your body movements. The technologist will stay in a nearby room. If you need to talk to him or her, use the intercom.  What a sleep study does  A sleep study monitors all the stages of your sleep. To do this, the following are recorded:  · Eye movements  · Heart rate, brain waves, and muscle activity  · Level of oxygen in your blood  · Breathing and snoring  · Sudden leg or body movements  If you have breathing problems, Continuous Positive Airway Pressure (CPAP) may be used. CPAP is a device that can help you breathe by adding mild air pressure to your airway passages and improve your sleep. It may be used during the second half of your study or on another night.  Getting your results  The technologist can answer some of your questions about the sleep study. But only your healthcare provider can explain the results. He or she will have the report of your sleep study within 1 to 2 weeks. Then your treatment options can be discussed with your healthcare provider, or you may be referred to a sleep disorders specialist.  Date Last Reviewed: 8/9/2015 © 2000-2017 [employer]. [address] [place] [address]. All rights reserved. This information is not intended as a substitute for professional medical care. Always follow your healthcare professional's instructions.        What Are Snoring and Obstructive Sleep Apnea?  If youve ever had a stuffed-up nose, you know the feeling of trying to breathe through a very narrow passageway. This is what happens in your throat when you snore. While you sleep, structures in your throat partially block your air passage, making the passage narrow and hard to breathe through. If the entire passage becomes blocked and you cant breathe at all, you have sleep apnea.      Snoring Obstructive sleep apnea   Snoring  If your throat structures are too large or the muscles relax too much during sleep, the air passage may  be partially blocked. As air from the nose or mouth passes around this blockage, the throat structures vibrate, causing the familiar sound of snoring. At times, this sound can be so loud that snorers wake up others, or even themselves, during the night. Snoring gets worse as more and more of the air passage is blocked.  Obstructive sleep apnea  If the structures completely block the throat, air cant flow to the lungs at all. This is called apnea (meaning no breathing). Since the lungs arent getting fresh air, the brain tells the body to wake up just enough to tighten the muscles and unblock the air passage. With a loud gasp, breathing begins again. This process may be repeated over and over again throughout the night, making your sleep fragmented with a lighter stage of sleep. Even though you do not remember waking up many times during the night to a lighter sleep, you feel tired the next day. The lack of sleep and fresh air can also strain your lungs, heart, and other organs, leading to problems such as high blood pressure, heart attack, or stroke.  Problems in the nose and jaw  Problems in the structure of the nose may obstruct breathing. A crooked (deviated) septum or swollen turbinates can make snoring worse or lead to apnea. Also, a receding jaw may make the tongue sit too far back, so its more likely to block the airway when youre asleep.        Date Last Reviewed: 7/18/2015  © 5359-9129 [employer]. [address] [place] [address], [place] [address]. All rights reserved. This information is not intended as a substitute for professional medical care. Always follow your healthcare professional's instructions.        Continuous Positive Air Pressure (CPAP)     A mask over the nose gently directs air into the throat to keep the airway open.   Continuous positive air pressure (CPAP) uses gentle air pressure to hold the airway open. CPAP is often the most effective treatment for sleep apnea and severe  snoring. It works very well for many people. But keep in mind that it can take several adjustments before the setup is right for you.  How CPAP works  The CPAP machine  is a small portable pump beside the bed. The pump sends air through a hose, which is held over your nose and mouth by a mask. Mild air pressure is gently pushed through your airway. The air pressure nudges sagging tissues aside. This widens the airway so you can breathe better. CPAP may be combined with other kinds of therapy for sleep apnea.  Types of air pressure treatments  There are different types of CPAP. Your doctor or CPAP technician will help you decide which type is best for you:  · Basic CPAP keeps the pressure constant all night long.  · A bilevel device (BiPAP) provides more pressure when you breathe in and less when you breathe out. A BiPAP machine also may be set to provide automatic breaths to maintain breathing if you stop breathing while sleeping.  · An autoCPAP device automatically adjusts pressure throughout the night and in response to changes such as body position, sleep stage, and snoring.  Date Last Reviewed: 8/10/2015  © 4594-4280 [employer]. [address] [place] [address], [place] [address]. All rights reserved. This information is not intended as a substitute for professional medical care. Always follow your healthcare professional's instructions.

## 2018-04-04 NOTE — ASSESSMENT & PLAN NOTE
continue Anoro daily,   cont duo nebs 3- 4 times a day. has albuterol if needed  rll infitrate, acute 3/2/2018 persist on 4/4/2018 chest xray  CT chest

## 2018-04-04 NOTE — PROGRESS NOTES
Subjective:      Chief Complaint   Patient presents with    Pneumonia      Jake Ortiz is a 77 y.o. male here for re-evaluation with repeat chest xray related to diagnosis of right lower lobe infiltrate seen on chest xray 3/2/2018.   rll pneumonia treated with Avelox and prednisone.  He continues on Anoro once daily controller for COPD diagnosis  Since increased cough with pneumonia diagnosis has been on duo nebs 2-3 times a day.   He is significantly improved over past several weeks of treatment.   Patient has history of cardiomegaly with chf in past, patient reports he is followed by Cardiology Dr. Edy Menendez with  cardiology group.   At last visit on 3/5/2018 evidence of mild chf began on lasix x 14 days, he was to follow up with Dr. Menendez. He has not contracted cardiology.   The cough is improved and some days no coughing. There is occasional productive cough of clear mucous.     Associated residual symptoms include: shortness of breath with exertion or any type of work around his home. Pul stress testing on 2/27/2018 no desats requiring supplemental oxygen.     Patient does not have a history of asthma.   Patient does not have a history of environmental allergens.   Patient typically frequents casinos for entertainment he tried to stay out during clearing of pneumonia, he did given in since feeling improved about 2 weeks ago and felt affected his breathing negatively so decided would stay away until completely improve.   Patient does have a history of smoking, quit in 1988.   Patient is followed on regular basis by Dr. Singh, last seen by Dr. Singh on 2/27/2018 when home oxygen ordered for sleep, obtained on 3/6/2018, patient abandoned use of night oxygen about 2 weeks ago. He felt was causing congestion and cough.   Encouraged to resume use. And to proceed with PSG testing ordered by his Primary Care Provider in Jan. 2018, he canceled after scheduled, he did not understand the significance of  "testing.      Sleep Apnea:    He presents for a sleep evaluation.  Patient has been observed with frequent awakening. Reports never told snores by wife  Patient reports "restful sleep.  He denies morning headache.   Hedenies impairment of activity during wakefulness.  He reports day time napping.  Day time napping duration 5-10 Minutes  [place] sleepiness score was 6  Neck circumference is 15.5 inches. 40 cm.  He denies recent weight gain.  Mallampati score 2  Bed time is 1130  Wake time is 0630, 0700  Sleep onset is within  15 Minutes.  Sleep maintenance difficulties related to none.  Wake after sleep onset occurs two times a night.  Nocturia occurs two times a night,   Sleep aids : Yes, occasional tylenol pm or sleep ease.   Dry mouth : No,    Sleep walking: No,   Sleep talking : No,   Sleep eating:No  Vivid Dreams : No,   Cataplexy : No,   Hypnogogic hallucinations:  No    STOP - BANG Questionnaire:     1. Snoring : Do you snore loudly ?    No  2. Tired : Do you often feel tired, fatigued, or sleepy during daytime?   Yes  3. Observed: Has anyone observed you stop breathing during your sleep?   No   4. Blood pressure : Do you have or are you being treated for high blood pressure?   Yes  5. BMI :BMI more than 35 kg/m2?   No  6. Age : Age over 50 yr old?   Yes  7. Neck circumference: Neck circumference greater than 40 cm?   No  8. Gender: Gender male?   Yes    Score: 4    High risk of NASREEN: Yes 5 - 8  Intermediate risk of NASREEN: Yes 3 - 4  Low risk of NASREEN: Yes 0 - 2    Social/Family/Med/Surgical History   reports that he quit smoking about 30 years ago. His smoking use included Cigarettes. He started smoking about 50 years ago. He has a 30.00 pack-year smoking history. He has never used smokeless tobacco. He reports that he does not drink alcohol or use drugs.  family history includes Cataracts in his mother; Diabetes in his mother; Heart attack in his mother; Heart disease in his brother, father, and mother; Stroke in " "his father.  Past Medical History:   Diagnosis Date    Abnormal PFT     Arthritis     Atrial fibrillation 10/19/2017    Back pain     Congestive heart failure 3/5/2018    Coronary artery disease     Diabetes mellitus 01/2018     am 02/27/2018    Hyperlipemia     Hypertension     Myocardial infarction     Obesity     Prostate cancer 2015    Tobacco dependence      Past Surgical History:   Procedure Laterality Date    CORONARY ARTERY BYPASS GRAFT  1987    SPINE SURGERY      fusion    TONSILLECTOMY       Review of Systems    Review of Systems   Constitutional: Negative for chills, fever and malaise/fatigue.   HENT: Negative for congestion and sinus pain.    Eyes: Negative.    Respiratory: Positive for cough and shortness of breath (constant since cough, 2/27 normal six walk, + over night oximetry requiring supple oxygen for sleep, he cx his psg ordered 1/2018 in ). Negative for sputum production and wheezing (resolved with tx of pneumonia and duo nebs).    Cardiovascular: Negative for chest pain, palpitations and leg swelling.   Gastrointestinal: Negative for abdominal pain, heartburn (improved with protonix), nausea and vomiting.   Genitourinary: Negative for dysuria and frequency.   Musculoskeletal: Negative for myalgias.   Skin: Negative.    Neurological: Negative.  Negative for headaches.   Endo/Heme/Allergies: Negative for environmental allergies.   All other systems reviewed and are negative.     Objective:       /64 (BP Location: Right arm, Patient Position: Sitting)   Pulse 72   Resp 18   Ht 5' 9.6" (1.768 m)   Wt 90.9 kg (200 lb 6.4 oz)   SpO2 96%   BMI 29.09 kg/m²     Physical Exam   Constitutional: He is oriented to person, place, and time. He appears well-developed and well-nourished. He is active and cooperative.  Non-toxic appearance. He does not appear ill. No distress.   HENT:   Head: Normocephalic and atraumatic.   Right Ear: External ear normal.   Left Ear: External " ear normal.   Nose: Nose normal.   Mouth/Throat: Oropharynx is clear and moist. No oropharyngeal exudate.   Eyes: Conjunctivae are normal.   Neck: Normal range of motion. Neck supple.   Cardiovascular: Normal rate, regular rhythm, normal heart sounds and intact distal pulses.    Pulmonary/Chest: Effort normal and breath sounds normal.   Abdominal: Soft.   Musculoskeletal: He exhibits no edema.   Neurological: He is alert and oriented to person, place, and time. He has normal reflexes.   Skin: Skin is warm and dry.   Psychiatric: He has a normal mood and affect. His behavior is normal. Judgment and thought content normal.   Vitals reviewed.    Chest xray 2018   Cardiac silhouette remains enlarged.  Sternotomy wires and cardiac pacing device are again noted.      There is persistent parenchymal opacity along the surface of the diaphragm as seen on the lateral projection, possibly within the left lung base which does not appear significantly changed from prior and may represent atelectasis versus pneumonia, correlate clinically.  No acute osseous findings demonstrated    Chest xray 3/2/2018  Findings: There is unchanged cardiomegaly. Aorta remains tortuous with calcifications. Median sternotomy changes are again noted.   There is a dual-lead left-sided cardiac pacemaker device which is unchanged. There is new right lower lung infiltrate.   Recommend followup to resolution. Osseous structures are stable in appearance.    Over night oximetry 2018  REPORT SUMMARY  Total recording time: 07:13:04  Excluded samplin:18:08  Total valid samplin:54:56  High pulse: 180 /min  Low pulse: 57 /min    Mean pulse: 63/min    High SpO2: 100%    Low SpO2: 63%    Mean SpO2  93.1 %  Cumulative time with oxygen saturation less than 88% (TC88):   00:41:20 (10%)  CLINICAL INTERPRETATION   There is  significant nocturnal oxygen desaturation,  Clinical   correlation is advised and  Recommend overnight polysomnography   if  clinically indicated.    Assessment:          1. Opacity of lung on imaging study  CT Chest Without Contrast   2. Pneumonia of right lower lobe due to infectious organism  CT Chest Without Contrast   3. Cough  CT Chest Without Contrast   4. Sleep-disordered breathing  Polysomnogram (CPAP will be added if patient meets diagnostic criteria.)   5. Nocturnal hypoxemia  Polysomnogram (CPAP will be added if patient meets diagnostic criteria.)   6. Congestive heart failure, unspecified congestive heart failure chronicity, unspecified congestive heart failure type     7. Cardiomegaly     8. Obesity, Class I, BMI 30-34.9     9. COPD, moderate         Plan:     Problem List Items Addressed This Visit     Cardiomegaly     Dr. Edy Menendez, cardiology, [facility]         Congestive heart failure     Improved symptomatically, follow up with cardiologist Dr. Edy Menendez,  cardiology group.          COPD, moderate     continue Anoro daily,   cont duo nebs 3- 4 times a day. has albuterol if needed  rll infitrate, acute 3/2/2018 persist on 4/4/2018 chest xray  CT chest           Nocturnal hypoxemia     Not compliant with NC 2 lm nightly.  adherence  Proceed with PSG          Relevant Orders    Polysomnogram (CPAP will be added if patient meets diagnostic criteria.)    Obesity, Class I, BMI 30-34.9     Continue to encourage exercise and dietary modification to obtain normal weight.              Other Visit Diagnoses     Opacity of lung on imaging study    -  Primary    Relevant Orders    CT Chest Without Contrast    Pneumonia of right lower lobe due to infectious organism        Relevant Orders    CT Chest Without Contrast    Cough        Relevant Orders    CT Chest Without Contrast    Sleep-disordered breathing        Relevant Orders    Polysomnogram (CPAP will be added if patient meets diagnostic criteria.)          overall cough is improved not resolved.   residual infiltrate, CT scan chest before additional  antibiotic therapy to determine if pneumonia vs atelectasis (cxr 4/4/2018:persistent parenchymal opacity along the surface of the diaphragm as seen on the lateral projection)  Proceed with PSG testing related to nocturnal hypoxemia seen on over night oximetry 2/2018. PSG was ordered by Primary Care Provider in 1/2018 and patient cx study, not ready to proceed.     Follow-up for Sleep Study Follow Up. CT chest call report remainder fu tbd .

## 2018-04-05 ENCOUNTER — HOSPITAL ENCOUNTER (OUTPATIENT)
Dept: RADIOLOGY | Facility: HOSPITAL | Age: 78
Discharge: HOME | End: 2018-04-05
Attending: NURSE PRACTITIONER
Payer: MEDICARE

## 2018-04-05 DIAGNOSIS — R05.9 COUGH: ICD-10-CM

## 2018-04-05 DIAGNOSIS — R91.8 OPACITY OF LUNG ON IMAGING STUDY: ICD-10-CM

## 2018-04-05 DIAGNOSIS — J18.9 PNEUMONIA OF RIGHT LOWER LOBE DUE TO INFECTIOUS ORGANISM: ICD-10-CM

## 2018-04-05 PROCEDURE — 71250 CT THORAX DX C-: CPT | Mod: TC

## 2018-04-06 DIAGNOSIS — R91.8 OPACITY OF LUNG ON IMAGING STUDY: Primary | ICD-10-CM

## 2018-04-06 DIAGNOSIS — R68.89 OTHER GENERAL SYMPTOMS AND SIGNS: ICD-10-CM

## 2018-04-06 DIAGNOSIS — J43.9 MIXED RESTRICTIVE AND OBSTRUCTIVE LUNG DISEASE: ICD-10-CM

## 2018-04-06 DIAGNOSIS — J98.4 MIXED RESTRICTIVE AND OBSTRUCTIVE LUNG DISEASE: ICD-10-CM

## 2018-04-06 NOTE — PROGRESS NOTES
"Review of CT of chest 4/5/2018 that revealed:  Consolidation with air bronchograms within the right lower lobe and to a lesser degree of nodular infiltrate within the left lower lobe; findings could reflect airspace disease or aspiration. Continued followup is recommended.    Proceed with speech swallow study evaluate for aspiration in lungs. Spoke with patient agreeable to swallow study with speech. (plan call report).       Telephoned 4/6/2018 spoke to patient and he reports remains improved with rare productive of white or clear mucous.   Able to do walking program daily, over all feels "good" with regard to breathing. His present complaint is chronic back pain.  He resumed use of oxygen NC 2 lm for about 2 hrs nightly then removes since keeps him from sleeping soundly.   He plans on keep appointment for PSG testing further evaluate nocturnal hypoxemia, not compliant with NC 2lm.    Advised with rt and lt lower lobe findings to continue albuterol nebs twice a day and Anoro daily.  No additional antibiotics at this time since improved with Avelox/prednisone prescribed 3/5/2018     Keep follow up in June with Dr. Singh will add follow up chest xray for June 2018 appointment with Dr. Singh.  Follow up sooner for any new onset of symptoms or worsening.     "

## 2018-04-10 ENCOUNTER — TELEPHONE (OUTPATIENT)
Dept: PULMONOLOGY | Facility: CLINIC | Age: 78
End: 2018-04-10

## 2018-04-11 ENCOUNTER — TELEPHONE (OUTPATIENT)
Dept: PULMONOLOGY | Facility: CLINIC | Age: 78
End: 2018-04-11

## 2018-04-11 ENCOUNTER — HOSPITAL ENCOUNTER (OUTPATIENT)
Dept: RADIOLOGY | Facility: HOSPITAL | Age: 78
Discharge: HOME | End: 2018-04-11
Attending: NURSE PRACTITIONER
Payer: MEDICARE

## 2018-04-11 DIAGNOSIS — R91.8 OPACITY OF LUNG ON IMAGING STUDY: ICD-10-CM

## 2018-04-11 DIAGNOSIS — R68.89 OTHER GENERAL SYMPTOMS AND SIGNS: ICD-10-CM

## 2018-04-11 PROCEDURE — G8996 SWALLOW CURRENT STATUS: HCPCS | Mod: CI

## 2018-04-11 PROCEDURE — G8998 SWALLOW D/C STATUS: HCPCS | Mod: CI

## 2018-04-11 PROCEDURE — 92611 MOTION FLUOROSCOPY/SWALLOW: CPT

## 2018-04-11 PROCEDURE — G8997 SWALLOW GOAL STATUS: HCPCS | Mod: CI

## 2018-04-11 PROCEDURE — 74230 X-RAY XM SWLNG FUNCJ C+: CPT | Mod: TC

## 2018-04-11 NOTE — PROCEDURES
Modified Barium Swallow    Patient Name:  Jake Ortiz   MRN:  [id number]      Recommendations:     Recommendations:                General Recommendations:  Follow-up not indicated  Diet recommendations: Regular with thin liquids  Aspiration Precautions: 1 bite/sip at a time, HOB to 90 degrees, Remain upright 30 minutes post meal and Small bites/sips   General Precautions: Standard,      Referral     Reason for Referral  Patient was referred for a Modified Barium Swallow Study to assess the efficiency of his/her swallow function, rule out aspiration and make recommendations regarding safe dietary consistencies, effective compensatory strategies, and safe eating environment.     Diagnosis: Diagnoses of Other general symptoms and signs  and Opacity of lung on imaging study were pertinent to this visit.    History:     Past Medical History:   Diagnosis Date    Abnormal PFT     Arthritis     Atrial fibrillation 10/19/2017    Back pain     Congestive heart failure 3/5/2018    Coronary artery disease     Diabetes mellitus 01/2018     am 02/27/2018    Hyperlipemia     Hypertension     Myocardial infarction     Obesity     Prostate cancer 2015    Tobacco dependence        Objective:     Current Respiratory Status:  Room air    Alert: yes    Cooperative: yes    Follows Directions: yes    Visualization  · Patient was seen in the lateral view    Oral Peripheral Examination  ·  unremarkable     Consistencies Assessed  · Thin liquids, puree, and solids with barium     Oral Preparation/Oral Phase  · WFL- Pt with adequate bolus acceptance, containment, control and timely A-P transfer across consistencies     Pharyngeal Phase   · WFL - adequate laryngeal elevation, bolus propulsion and clearance, and airway protection  · No penetration or aspiration was visualized     Cervical Esophageal Phase  · UES appeared to accommodate all bolus types without stasis or retrograde movement observed     Assessment:      Impressions  ·  Patient with oral and pharyngeal phases of swallowing deemed WFL.  No further ST warranted at this time.      Education  Results were discussed with patient.      Time Tracking:   SLP Treatment Date:    4/11/2018  Speech Start Time:   12:45  Speech Stop Time:      13:15  Speech Total Time (min):   30 minutes    Shona Starr CCC-SLP  04/11/2018

## 2018-04-11 NOTE — TELEPHONE ENCOUNTER
Telephoned to advise of nomral swallow study, no sign of aspiration into airway. No answer. Left message. Results per [facility] or call back for results.     Recommendations:                General Recommendations:  Follow-up not indicated  Diet recommendations: Regular with thin liquids  Aspiration Precautions: 1 bite/sip at a time, HOB to 90 degrees, Remain upright 30 minutes post meal and Small bites/sips   General Precautions: Standard.    Reason for Referral  Patient was referred for a Modified Barium Swallow Study to assess the efficiency of his/her swallow function, rule out aspiration and make recommendations regarding safe dietary consistencies, effective compensatory strategies, and safe eating environment.      Diagnosis: Diagnoses of Other general symptoms and signs  and Opacity of lung on imaging study were pertinent to this visit.     History:           Past Medical History:   Diagnosis Date    Abnormal PFT      Arthritis      Atrial fibrillation 10/19/2017    Back pain      Congestive heart failure 3/5/2018    Coronary artery disease      Diabetes mellitus 01/2018      am 02/27/2018    Hyperlipemia      Hypertension      Myocardial infarction      Obesity      Prostate cancer 2015    Tobacco dependence           Objective:      Current Respiratory Status:  Room air     Alert: yes     Cooperative: yes     Follows Directions: yes     Visualization  · Patient was seen in the lateral view     Oral Peripheral Examination  ·  unremarkable      Consistencies Assessed  · Thin liquids, puree, and solids with barium      Oral Preparation/Oral Phase  · WFL- Pt with adequate bolus acceptance, containment, control and timely A-P transfer across consistencies      Pharyngeal Phase   · WFL - adequate laryngeal elevation, bolus propulsion and clearance, and airway protection  · No penetration or aspiration was visualized      Cervical Esophageal Phase  · UES appeared to accommodate all bolus types  without stasis or retrograde movement observed      Assessment:      Impressions  ·  Patient with oral and pharyngeal phases of swallowing deemed WFL.  No further ST warranted at this time.       Education  Results were discussed with patient.        Time Tracking:   SLP Treatment Date:    4/11/2018  Speech Start Time:   12:45  Speech Stop Time:      13:15  Speech Total Time (min):   30 minutes     CESAR Rico  04/11/2018      Electronically signed by CESAR Smith at 4/11/2018  5:18 PM

## 2018-04-23 ENCOUNTER — NUTRITION (OUTPATIENT)
Dept: DIABETES | Facility: CLINIC | Age: 78
End: 2018-04-23
Payer: MEDICARE

## 2018-04-23 VITALS — HEIGHT: 69 IN | WEIGHT: 206.44 LBS | BODY MASS INDEX: 30.58 KG/M2

## 2018-04-23 DIAGNOSIS — E66.9 DIABETES MELLITUS TYPE 2 IN OBESE: Primary | ICD-10-CM

## 2018-04-23 DIAGNOSIS — E11.69 DIABETES MELLITUS TYPE 2 IN OBESE: Primary | ICD-10-CM

## 2018-04-23 PROCEDURE — 99999 PR PBB SHADOW E&M-EST. PATIENT-LVL III: CPT | Mod: PBBFAC,,, | Performed by: DIETITIAN, REGISTERED

## 2018-04-23 PROCEDURE — G0108 DIAB MANAGE TRN  PER INDIV: HCPCS | Mod: S$GLB,,, | Performed by: DIETITIAN, REGISTERED

## 2018-04-23 NOTE — PATIENT INSTRUCTIONS
Try adding evening snack with protein for possible better fasting blood sugar the next morning.

## 2018-04-25 NOTE — PROGRESS NOTES
Diabetes Education  Author: Cabrera Shah RD  Date: 4/25/2018    Diabetes Education Visit  Diabetes Education Record Assessment/Progress: Post Program/Follow-up    Diabetes Type  Diabetes Type : Type II    Referring Provider: Chanda Brush*  77 y.o. male in clinic today for diabetes education f/u.   DM MEDICATIONS:  None    a1c dropped from 7.5 in Jan to 6.6    Hemoglobin A1C   Date Value Ref Range Status   02/16/2018 6.6 (H) 4.0 - 5.6 % Final     Comment:     According to ADA guidelines, hemoglobin A1c <7.0% represents  optimal control in non-pregnant diabetic patients. Different  metrics may apply to specific patient populations.   Standards of Medical Care in Diabetes-2016.  For the purpose of screening for the presence of diabetes:  <5.7%     Consistent with the absence of diabetes  5.7-6.4%  Consistent with increasing risk for diabetes   (prediabetes)  >or=6.5%  Consistent with diabetes  Currently, no consensus exists for use of hemoglobin A1c  for diagnosis of diabetes for children.  This Hemoglobin A1c assay has significant interference with fetal   hemoglobin   (HbF). The results are invalid for patients with abnormal amounts of   HbF,   including those with known Hereditary Persistence   of Fetal Hemoglobin. Heterozygous hemoglobin variants (HbAS, HbAC,   HbAD, HbAE, HbA2) do not significantly interfere with this assay;   however, presence of multiple variants in a sample may impact the %   interference.          Nutrition  What type of beverages do you drink?: water, milk, other (see comments), diet soda/tea (No longer drinking fruit drinks with added sugar)  Meal Plan 24 Hour Recall - Breakfast: 2 small cinnamon rolls, coffee w/creamer and Equal  Meal Plan 24 Hour Recall - Lunch: 1/2 sausage poboy, water  Meal Plan 24 Hour Recall - Dinner: baked chicken, potatoes, water (dinner is usually 5p to 5:30p)  Meal Plan 24 Hour Recall - Snack: 8pm - SF ice cream or SF popsicle    Monitoring   Self  Monitoring : per pt recall -  fst:  145-160  Blood Glucose Logs: No    Exercise   Exercise Type: none  Frequency: Never    Current Diabetes Treatment   Current Treatment: Diet    Barriers to Change  Barriers to Change: None  Learning Challenges : None    Readiness to Learn   Readiness to Learn : Acceptance    Cultural Influences  Cultural Influences: None    Diabetes Education Assessment/Progress  Diabetes Disease Process (diabetes disease process and treatment options): Discussion, Individual Session, Demonstrates Understanding/Competency(verbalizes/demonstrates)  Nutrition (Incorporating nutritional management into one's lifestyle): Discussion, Individual Session, Demonstrates Understanding/Competency (verbalizes/demonstrates), Written Materials Provided  Physical Activity (incorporating physical activity into one's lifestyle): Discussion, Individual Session, Demonstrates Understanding/Competency (verbalizes/demonstrates)  Medications (states correct name, dose, onset, peak, duration, side effects & timing of meds): Discussion, Individual Session, Demonstrates Understanding/Competency(verbalizes/demonstrates)  Monitoring (monitoring blood glucose/other parameters & using results): Discussion, Individual Session, Demonstrates Understanding/Competency (verbalizes/demonstrates)  Acute Complications (preventing, detecting, and treating acute complications): Discussion, Individual Session, Demonstrates Understanding/Competency (verbalizes/demonstrates)  Chronic Complications (preventing, detecting, and treating chronic complications): Discussion, Individual Session, Demonstrates Understanding/Competency (verbalizes/demonstrates)  Clinical (diabetes, other pertinent medical history, and relevant comorbidities reviewed during visit): Discussion, Individual Session, Demonstrates Understanding/Competency (verbalizes/demonstrates)  Cognitive (knowledge of self-management skills, functional health literacy): Discussion,  Individual Session, Demonstrates Understanding/Competency (verbalizes/demonstrates)  Psychosocial (emotional response to diabetes): Discussion, Individual Session, Demonstrates Understanding/Competency (verbalizes/demonstrates)  Behavioral (readiness for change, lifestyle practices, self-care behaviors): Discussion, Individual Session, Demonstrates Understanding/Competency (verbalizes/demonstrates)    Goals  Patient has selected/evaluated goals during today's session: Yes, selected  Healthy Eating: In Progress (Pt is following meal plan, eating small portions of carbohydrate foods)  Monitoring: Set (Monitoring blood sugar daily - goal to make changes to get fasting BG to less than 130)  Start Date: 04/23/18  Target Date: 07/23/18      Pt noted that when he eats a nighttime snack, his fasting BG is better.   He will try having a nighttime snack every night and will include protein source.     Diabetes Care Plan/Intervention  Education Plan/Intervention: Individual Follow-Up DSMT   F/u in 3 months. Will have a1c done in May.    Diabetes Meal Plan  Restrictions: Low Sodium, Restricted Carbohydrate, Low Fat  Calories: 1800, 1600 (ABW = 187 lbs (85 kg))  Carbohydrate Per Meal: 45-60g  Carbohydrate Per Snack : 15-20g    Education Units of Time   Time Spent: 30 min    Health Maintenance was reviewed today with patient. Discussed with patient importance of routine eye exams, foot exams/foot care, blood work (i.e.: A1c, microalbumin, and lipid), dental visits, yearly flu vaccine, and pneumonia vaccine as indicated by PCP. Patient verbalized understanding.     Health Maintenance Topics with due status: Not Due       Topic Last Completion Date    TETANUS VACCINE 02/10/2015    PROSTATE-SPECIFIC ANTIGEN 10/19/2017    Colonoscopy 10/19/2017    Lipid Panel 10/24/2017    Foot Exam 01/12/2018    Hemoglobin A1c 02/16/2018    Eye Exam 02/27/2018    High Dose Statin 04/23/2018     There are no preventive care reminders to display for  this patient.

## 2018-04-28 ENCOUNTER — PATIENT MESSAGE (OUTPATIENT)
Dept: PULMONOLOGY | Facility: CLINIC | Age: 78
End: 2018-04-28

## 2018-04-30 NOTE — TELEPHONE ENCOUNTER
"Telephoned spoke to patient he is refusing sleep study and over night oximetry studies, he states understanding of reasoning for testing with hypoxemia seen on over night oximetry Feb 2017.   He reports he is not wearing oxygen at night any longer since treated and improved with pneumonia treatment and he is "breathing fine", shortness of breath improved. He states understanding that he could still have low oxygen levels during sleep, despite normal oxygen while awake, risk/benefits advised.   He still declines testing, he will keep his follow up for repeat chest xray 6/13/2018 w/fu with Dr. Singh.      "

## 2018-05-01 ENCOUNTER — PATIENT MESSAGE (OUTPATIENT)
Dept: PULMONOLOGY | Facility: CLINIC | Age: 78
End: 2018-05-01

## 2018-05-01 DIAGNOSIS — G47.34 NOCTURNAL HYPOXEMIA: Primary | ICD-10-CM

## 2018-05-01 NOTE — TELEPHONE ENCOUNTER
Over night pulse oximetry 2/27/2018: Cumulative time with oxygen saturation less than 88% (TC88): 00:41:20 (10%).    Patient would like home oxygen discontinued, requested PSG. Patient declined.  Request over night oximetry to determine if D/C home oxygen order is appropriate.     Orders Placed This Encounter   Procedures    Pulse oximetry overnight     Standing Status:   Future     Standing Expiration Date:   5/1/2019     Order Specific Question:   Reason for Test?     Answer:   Other     Comments:   nocturnal hypxemia evaluate continued need for supplemental oxygen with sleep      Order Specific Question:   Symptoms:     Answer:   Other     Comments:   snoring     Order Specific Question:   Oxygen Settings:     Answer:   2 lm     Order Specific Question:   BiPAP Settings:     Answer:   na     Order Specific Question:   CPAP Settings:     Answer:   na     Order Specific Question:   Room Air:     Answer:   Yes     1. Nocturnal hypoxemia  Pulse oximetry overnight

## 2018-05-02 ENCOUNTER — TELEPHONE (OUTPATIENT)
Dept: PULMONOLOGY | Facility: CLINIC | Age: 78
End: 2018-05-02

## 2018-05-02 NOTE — TELEPHONE ENCOUNTER
Patient contacted, advised of testing needed prior to  D/c oxygen, over saturation scheduled,  Patient stated understanding.

## 2018-05-03 ENCOUNTER — PROCEDURE VISIT (OUTPATIENT)
Dept: PULMONOLOGY | Facility: CLINIC | Age: 78
End: 2018-05-03
Payer: MEDICARE

## 2018-05-03 DIAGNOSIS — G47.34 NOCTURNAL HYPOXEMIA: ICD-10-CM

## 2018-05-03 PROCEDURE — 94762 N-INVAS EAR/PLS OXIMTRY CONT: CPT | Mod: S$GLB,,, | Performed by: INTERNAL MEDICINE

## 2018-05-04 NOTE — PROCEDURES
[facility]  [address] * [place] [address]  Telephone: [phone]  Test date: 18 Start: 18 23:26:36 Jake Ortiz  Doctor: Dr. Roman End: 18 06:21:20 [id number]  Oximetry: Summary Report  Comments: Room Air  Recording time: 06:54:44 Highest pulse: 99 Highest SpO2: 99%  Excluded samplin:07:08 Lowest pulse: 54 Lowest SpO2: 81%  Total valid samplin:47:36 Mean pulse: 68 Mean SpO2: 93.6%  1 S.D.: 8.5 1 S.D.: 3.8  Time with SpO2<90: 1:03:36, 15.6%  Time with SpO2<80: 0:00:00, 0.0%  Time with SpO2<70: 0:00:00, 0.0%  Time with SpO2<60: 0:00:00, 0.0%  Time with SpO2<88: 0:44:04, 10.8%  Time with SpO2 =>90: 5:44:00, 84.4%  Time with SpO2=>80 & <90: 1:03:36, 15.6%  Time with SpO2=>70 & <80: 0:00:00, 0.0%  Time with SpO2=>60 & <70: 0:00:00, 0.0%  The longest continuous time with saturation <=89 was 00:08:40, which started at  18 01:02:20.  A desaturation event was defined as a decrease of saturation by 4 or more.  One event was excluded due to artifact.  There were 11 desaturation events over 3 minutes duration.  There were 121 desaturation events of less than 3 minutes duration during which:  The mean high was 96.0%. The mean low was 90.2%.  The number of these events that were:  > 0 & <10 seconds: 4 > 0 seconds: 121  =>10 & <20 seconds: 28 =>10 seconds: 117  =>20 & <30 seconds: 34 =>20 seconds: 89  =>30 & <40 seconds: 6 =>30 seconds: 55  =>40 & <50 seconds: 11 =>40 seconds: 49  =>50 & <60 seconds: 6 =>50 seconds: 38  =>60 seconds: 32 =>60 seconds: 32  The mean length of desaturation events that were >=10 sec & <=3 mins was: 47.5 sec.  Desaturation event index (events >=10 sec per sampled hour): 17.2  Desaturation event index (events >= 0 sec per sampled hour): 17.8  © 8876-3313 [employer] Oximetry version Standard CF5705.  Oximeter: Respironics 920M Plus memory, 4 second resolution.  [facility] [address] * [facility]  [place] [address]  Telephone: [phone]    Overnight Oxygen Saturation Study:    INTERPRETATION:  Conditions of Test: Room air stable outpatient.  Interpretation of results of overnight oxygen saturation study. This was a technically  adequate study. Overnight oxygen saturation study is abnormal with O2 saturation less than 88%.   Time with SpO2<88: 0:44:04, 10.8% of the study period. Lowest oxygen saturation recorded was 81%.  Clinical correlation suggested.  Consider polysomnogram for evaluation of Obstructive Sleep Apnea     Mahin Roman MD    Medicare Criteria Comments:   Oximetry test results suggest the patient does  fall under Medicare Group 1 Criteria.   (Arterial oxygen saturation at or below 88% for at least 5 minutes taken during sleep)    Details about Medicare Group Criteria coverage can be found at http://www.cms.hhs.gov/manuals/downloads/

## 2018-05-08 ENCOUNTER — PATIENT MESSAGE (OUTPATIENT)
Dept: FAMILY MEDICINE | Facility: CLINIC | Age: 78
End: 2018-05-08

## 2018-05-10 ENCOUNTER — PATIENT OUTREACH (OUTPATIENT)
Dept: ADMINISTRATIVE | Facility: HOSPITAL | Age: 78
End: 2018-05-10

## 2018-05-10 ENCOUNTER — PATIENT MESSAGE (OUTPATIENT)
Dept: PULMONOLOGY | Facility: CLINIC | Age: 78
End: 2018-05-10

## 2018-05-10 DIAGNOSIS — J43.9 MIXED RESTRICTIVE AND OBSTRUCTIVE LUNG DISEASE: Chronic | ICD-10-CM

## 2018-05-10 DIAGNOSIS — G47.34 NOCTURNAL HYPOXEMIA: Primary | ICD-10-CM

## 2018-05-10 DIAGNOSIS — I48.91 ATRIAL FIBRILLATION, UNSPECIFIED TYPE: ICD-10-CM

## 2018-05-10 DIAGNOSIS — R06.83 SNORING: ICD-10-CM

## 2018-05-10 DIAGNOSIS — I51.7 CARDIOMEGALY: ICD-10-CM

## 2018-05-10 DIAGNOSIS — G47.30 SLEEP-DISORDERED BREATHING: ICD-10-CM

## 2018-05-10 DIAGNOSIS — J98.4 MIXED RESTRICTIVE AND OBSTRUCTIVE LUNG DISEASE: Chronic | ICD-10-CM

## 2018-05-10 DIAGNOSIS — I50.9 CONGESTIVE HEART FAILURE, UNSPECIFIED HF CHRONICITY, UNSPECIFIED HEART FAILURE TYPE: ICD-10-CM

## 2018-05-10 DIAGNOSIS — J44.9 COPD, MODERATE: ICD-10-CM

## 2018-05-10 DIAGNOSIS — I10 ESSENTIAL HYPERTENSION: ICD-10-CM

## 2018-05-11 ENCOUNTER — PES CALL (OUTPATIENT)
Dept: ADMINISTRATIVE | Facility: CLINIC | Age: 78
End: 2018-05-11

## 2018-05-11 ENCOUNTER — TELEPHONE (OUTPATIENT)
Dept: PULMONOLOGY | Facility: CLINIC | Age: 78
End: 2018-05-11

## 2018-05-14 ENCOUNTER — OFFICE VISIT (OUTPATIENT)
Dept: PULMONOLOGY | Facility: CLINIC | Age: 78
End: 2018-05-14
Payer: MEDICARE

## 2018-05-14 VITALS
SYSTOLIC BLOOD PRESSURE: 120 MMHG | OXYGEN SATURATION: 99 % | BODY MASS INDEX: 30.01 KG/M2 | DIASTOLIC BLOOD PRESSURE: 64 MMHG | RESPIRATION RATE: 18 BRPM | HEART RATE: 85 BPM | WEIGHT: 202.63 LBS | HEIGHT: 69 IN

## 2018-05-14 DIAGNOSIS — J44.9 COPD, MODERATE: Primary | Chronic | ICD-10-CM

## 2018-05-14 DIAGNOSIS — G47.34 NOCTURNAL HYPOXEMIA: Chronic | ICD-10-CM

## 2018-05-14 DIAGNOSIS — D64.9 ANEMIA, UNSPECIFIED TYPE: Chronic | ICD-10-CM

## 2018-05-14 PROCEDURE — 99999 PR PBB SHADOW E&M-EST. PATIENT-LVL III: CPT | Mod: PBBFAC,,, | Performed by: INTERNAL MEDICINE

## 2018-05-14 PROCEDURE — 99215 OFFICE O/P EST HI 40 MIN: CPT | Mod: S$GLB,,, | Performed by: INTERNAL MEDICINE

## 2018-05-14 PROCEDURE — 3078F DIAST BP <80 MM HG: CPT | Mod: CPTII,S$GLB,, | Performed by: INTERNAL MEDICINE

## 2018-05-14 PROCEDURE — 3074F SYST BP LT 130 MM HG: CPT | Mod: CPTII,S$GLB,, | Performed by: INTERNAL MEDICINE

## 2018-05-14 PROCEDURE — 99499 UNLISTED E&M SERVICE: CPT | Mod: S$PBB,,, | Performed by: INTERNAL MEDICINE

## 2018-05-14 NOTE — PATIENT INSTRUCTIONS
Lung Anatomy  Your lungs take air in to give your body oxygen, which the body needs to work. Your lungs, like all the tissues in your body, are made up of billions of tiny specialized cells. Old lung cells die and are replaced by new, identical lung cells. This natural process helps ensure healthy lungs.    Date Last Reviewed: 11/1/2016  © 5625-6677 [employer]. [address] [place] [address], [place] [address]. All rights reserved. This information is not intended as a substitute for professional medical care. Always follow your healthcare professional's instructions.

## 2018-05-14 NOTE — PROGRESS NOTES
Subjective:      Patient ID: Jake Ortiz is a 77 y.o. male.  Patient Active Problem List   Diagnosis    Hypertension    Hyperlipemia    Obesity, Class I, BMI 30-34.9    Renal insufficiency    Anemia    CAD (coronary artery disease)    MI, old    S/P CABG x 3    DDD (degenerative disc disease), lumbar    Psoriasis    Rheumatoid factor positive    Multiple joint pain    Hyperuricemia    Mixed restrictive and obstructive lung disease    Granulomatous disease    Arteriosclerosis of aorta    Adenocarcinoma of prostate    Atrial fibrillation    Nocturnal hypoxemia    Cardiomegaly    Congestive heart failure    COPD, moderate    Gastroesophageal reflux disease     Problem list has been reviewed.    Chief Complaint: Mixed restrictive and obsrtructive lung disease      Group Spirometric Classification Exacerbations / year mMRC CAT   Group B: Low risk; more symptoms  GOLD 1- 2  < = 1 >= 2 >=10     FEV1 => 1.82 L  ( 62% predicted)       HPI     He reports progressive KING and fatigue.     He is recuperating from pneumonia.     Patients reports NYHA II dyspnea    The patient does not have currently have symptoms / an exacerbation.       No recent change in breathing.       COPD QUESTIONNAIRE  How often do you cough?: Almost never  How often do you have phlegm (mucus) in your chest?: Almost never  How often does your chest feel tight?: Never  When you walk up a hill or one flight of stairs, how often are you breathless?: All of the time  How often are you limited doing any activities at home?: Most of the time  How often are you confident leaving the house despite your lung condition?: All of the time  How often do you sleep soundly?: A little of the time  How often do you have energy?: Some of the time  Total score: 16      A full  review of systems, past , family  and social histories was performed except as mentioned in the note above, these are non contributory to the main issues discussed today.        Previous Report Reviewed: lab reports, office notes and radiology reports     The following portions of the patient's history were reviewed and updated as appropriate: He  has a past medical history of Abnormal PFT; Arthritis; Atrial fibrillation (10/19/2017); Back pain; Congestive heart failure (3/5/2018); Coronary artery disease; Diabetes mellitus (01/2018); Hyperlipemia; Hypertension; Myocardial infarction; Obesity; Prostate cancer (2015); and Tobacco dependence.  He  has a past surgical history that includes Spine surgery; Tonsillectomy; and Coronary artery bypass graft (1987).  His family history includes Cataracts in his mother; Diabetes in his mother; Heart attack in his mother; Heart disease in his brother, father, and mother; Stroke in his father.  He  reports that he quit smoking about 30 years ago. His smoking use included Cigarettes. He started smoking about 50 years ago. He has a 30.00 pack-year smoking history. He has never used smokeless tobacco. He reports that he does not drink alcohol or use drugs.  He has a current medication list which includes the following prescription(s): albuterol, aspirin, atorvastatin, blood sugar diagnostic, blood-glucose meter, cholecalciferol (vitamin d3), lancets, multivitamin, rivaroxaban, and umeclidinium-vilanterol.  He is allergic to amiodarone..    Review of Systems   Constitutional: Negative for fever, chills and fatigue.   HENT: Negative for nosebleeds, postnasal drip, rhinorrhea, sore throat and congestion.    Respiratory: Positive for cough, sputum production and dyspnea on extertion. Negative for hemoptysis, choking, shortness of breath, orthopnea, previous hospitialization due to pulmonary problems and use of rescue inhaler.    Cardiovascular: Negative for chest pain and leg swelling.   Genitourinary: Negative for difficulty urinating and hematuria.   Musculoskeletal: Negative for arthralgias, back pain and myalgias.   Skin: Negative for rash.  "  Gastrointestinal: Positive for acid reflux. Negative for nausea, vomiting and abdominal pain.   Neurological: Negative for dizziness, syncope, light-headedness and headaches.   Hematological: Bleeds easily.   Psychiatric/Behavioral: The patient is nervous/anxious.         Objective:   /64   Pulse 85   Resp 18   Ht 5' 9" (1.753 m)   Wt 91.9 kg (202 lb 9.6 oz)   SpO2 99% Comment: 2 liters  BMI 29.92 kg/m²   Body mass index is 29.92 kg/m².    Physical Exam   Constitutional: He is oriented to person, place, and time. He appears well-developed and well-nourished. He is active and cooperative.  Non-toxic appearance. He does not appear ill. No distress.   HENT:   Head: Normocephalic and atraumatic.   Right Ear: External ear normal.   Left Ear: External ear normal.   Mouth/Throat: No oropharyngeal exudate.   Eyes: Conjunctivae are normal.   Neck: Normal range of motion. Neck supple.   Cardiovascular: Normal rate, regular rhythm and normal heart sounds.    Pulmonary/Chest: Effort normal and breath sounds normal.   Abdominal: Soft.   Musculoskeletal: He exhibits no edema.   Neurological: He is alert and oriented to person, place, and time. He has normal reflexes.   Skin: Skin is warm and dry. Capillary refill takes less than 2 seconds.   Psychiatric: He has a normal mood and affect. His behavior is normal.   Vitals reviewed.      Personal Diagnostic Review    Exercise oximetry:   Oxygen Saturation on room air at rest :99 % heart rate 85 / min  Oxygen Saturation on room air with exercise: 95 %  Heart rate 170 / min        Assessment / plan:     Discussed diagnosis, its evaluation, treatment and usual course. All questions answered.    Problem List Items Addressed This Visit        Pulmonary    COPD, moderate - Primary    Current Assessment & Plan     MIXED COPD ROS: No significant ongoing wheezing or shortness of breath,   New concerns: Progressive dyspnea   Exam: appears well, vitals normal, no respiratory " distress, acyanotic, normal RR.   Assessment:  MIXED COPD stable.   Plan:  CONTINUE  ANORO.              Oncology    Anemia    Current Assessment & Plan     Ambulatory referral to hematology         Relevant Orders    Ambulatory consult to Hematology       Other    Nocturnal hypoxemia    Current Assessment & Plan     Continue nocturnal oxygen supplementation at 2 L /min.                  TIME SPENT WITH PATIENT: Time spent: 40 minutes in face to face  discussion concerning diagnosis, prognosis, review of lab and test results, benefits of treatment as well as management of disease, counseling of patient and coordination of care between various health  care providers . Greater than half the time spent was used for coordination of care and counseling of patient.     Follow-up in about 4 weeks (around 6/13/2018) for COPD, Nocturnal hypoxemia.

## 2018-05-16 ENCOUNTER — LAB VISIT (OUTPATIENT)
Dept: LAB | Facility: HOSPITAL | Age: 78
End: 2018-05-16
Attending: FAMILY MEDICINE
Payer: MEDICARE

## 2018-05-16 ENCOUNTER — PATIENT MESSAGE (OUTPATIENT)
Dept: FAMILY MEDICINE | Facility: CLINIC | Age: 78
End: 2018-05-16

## 2018-05-16 DIAGNOSIS — E11.69 DIABETES MELLITUS TYPE 2 IN OBESE: ICD-10-CM

## 2018-05-16 DIAGNOSIS — E66.9 DIABETES MELLITUS TYPE 2 IN OBESE: ICD-10-CM

## 2018-05-16 LAB
ANION GAP SERPL CALC-SCNC: 11 MMOL/L
BUN SERPL-MCNC: 34 MG/DL
CALCIUM SERPL-MCNC: 9.2 MG/DL
CHLORIDE SERPL-SCNC: 108 MMOL/L
CO2 SERPL-SCNC: 21 MMOL/L
CREAT SERPL-MCNC: 1.7 MG/DL
EST. GFR  (AFRICAN AMERICAN): 44.6 ML/MIN/1.73 M^2
EST. GFR  (NON AFRICAN AMERICAN): 38.6 ML/MIN/1.73 M^2
ESTIMATED AVG GLUCOSE: 148 MG/DL
GLUCOSE SERPL-MCNC: 144 MG/DL
HBA1C MFR BLD HPLC: 6.8 %
POTASSIUM SERPL-SCNC: 4.5 MMOL/L
SODIUM SERPL-SCNC: 140 MMOL/L

## 2018-05-16 PROCEDURE — 36415 COLL VENOUS BLD VENIPUNCTURE: CPT | Mod: PO

## 2018-05-16 PROCEDURE — 83036 HEMOGLOBIN GLYCOSYLATED A1C: CPT

## 2018-05-16 PROCEDURE — 80048 BASIC METABOLIC PNL TOTAL CA: CPT

## 2018-05-17 ENCOUNTER — TELEPHONE (OUTPATIENT)
Dept: FAMILY MEDICINE | Facility: CLINIC | Age: 78
End: 2018-05-17

## 2018-05-17 ENCOUNTER — LAB VISIT (OUTPATIENT)
Dept: LAB | Facility: HOSPITAL | Age: 78
End: 2018-05-17
Attending: FAMILY MEDICINE
Payer: MEDICARE

## 2018-05-17 DIAGNOSIS — N17.9 ACUTE KIDNEY INJURY: ICD-10-CM

## 2018-05-17 DIAGNOSIS — N17.9 ACUTE KIDNEY INJURY: Primary | ICD-10-CM

## 2018-05-17 LAB
ANION GAP SERPL CALC-SCNC: 12 MMOL/L
BUN SERPL-MCNC: 32 MG/DL
CALCIUM SERPL-MCNC: 9.1 MG/DL
CHLORIDE SERPL-SCNC: 107 MMOL/L
CO2 SERPL-SCNC: 20 MMOL/L
CREAT SERPL-MCNC: 1.7 MG/DL
EST. GFR  (AFRICAN AMERICAN): 44.6 ML/MIN/1.73 M^2
EST. GFR  (NON AFRICAN AMERICAN): 38.6 ML/MIN/1.73 M^2
GLUCOSE SERPL-MCNC: 211 MG/DL
POTASSIUM SERPL-SCNC: 4.1 MMOL/L
SODIUM SERPL-SCNC: 139 MMOL/L

## 2018-05-17 PROCEDURE — 36415 COLL VENOUS BLD VENIPUNCTURE: CPT | Mod: PO

## 2018-05-17 PROCEDURE — 80048 BASIC METABOLIC PNL TOTAL CA: CPT

## 2018-05-17 NOTE — TELEPHONE ENCOUNTER
----- Message from Abigail Sawant sent at 5/17/2018 12:02 PM CDT -----  Contact: pt   Kami w/ /hematology/oncology states that she need to talk to the nurse regarding pt blood work    ..[phone]

## 2018-05-18 ENCOUNTER — TELEPHONE (OUTPATIENT)
Dept: FAMILY MEDICINE | Facility: CLINIC | Age: 78
End: 2018-05-18

## 2018-05-18 NOTE — TELEPHONE ENCOUNTER
Spoke to pt wife. She was wondering why a referral to [facility] hemoc was never made. I told her I called the pt to claify where he wanted to go and he stated that he was already scheduled with a hem oc here at [facility] and he had no knowledge about the hem oc at [facility]. The pt and his wife are not on the same page and  Are confused.

## 2018-05-18 NOTE — TELEPHONE ENCOUNTER
----- Message from Vicki Aj sent at 5/17/2018  4:43 PM CDT -----  Contact: Pt/Wife  Please give pt wife a call at ..[phone] (home) [phone] (work) regarding the pt referral to a hematologist at [facility].

## 2018-05-21 ENCOUNTER — TELEPHONE (OUTPATIENT)
Dept: URGENT CARE | Facility: CLINIC | Age: 78
End: 2018-05-21

## 2018-05-21 ENCOUNTER — OFFICE VISIT (OUTPATIENT)
Dept: FAMILY MEDICINE | Facility: CLINIC | Age: 78
End: 2018-05-21
Payer: MEDICARE

## 2018-05-21 ENCOUNTER — LAB VISIT (OUTPATIENT)
Dept: LAB | Facility: HOSPITAL | Age: 78
End: 2018-05-21
Attending: FAMILY MEDICINE
Payer: MEDICARE

## 2018-05-21 VITALS
OXYGEN SATURATION: 100 % | DIASTOLIC BLOOD PRESSURE: 54 MMHG | TEMPERATURE: 97 F | HEIGHT: 69 IN | SYSTOLIC BLOOD PRESSURE: 110 MMHG | HEART RATE: 82 BPM | RESPIRATION RATE: 18 BRPM | BODY MASS INDEX: 29.74 KG/M2 | WEIGHT: 200.81 LBS

## 2018-05-21 DIAGNOSIS — Z79.01 LONG TERM CURRENT USE OF ANTICOAGULANT: ICD-10-CM

## 2018-05-21 DIAGNOSIS — G47.34 NOCTURNAL HYPOXEMIA: ICD-10-CM

## 2018-05-21 DIAGNOSIS — D71 GRANULOMATOUS DISEASE: ICD-10-CM

## 2018-05-21 DIAGNOSIS — N17.9 ACUTE KIDNEY INJURY: ICD-10-CM

## 2018-05-21 DIAGNOSIS — E11.69 DIABETES MELLITUS TYPE 2 IN OBESE: Primary | ICD-10-CM

## 2018-05-21 DIAGNOSIS — I50.9 CONGESTIVE HEART FAILURE, UNSPECIFIED HF CHRONICITY, UNSPECIFIED HEART FAILURE TYPE: ICD-10-CM

## 2018-05-21 DIAGNOSIS — C61 ADENOCARCINOMA OF PROSTATE: ICD-10-CM

## 2018-05-21 DIAGNOSIS — I70.0 ARTERIOSCLEROSIS OF AORTA: ICD-10-CM

## 2018-05-21 DIAGNOSIS — I48.91 ATRIAL FIBRILLATION, UNSPECIFIED TYPE: ICD-10-CM

## 2018-05-21 DIAGNOSIS — J43.9 MIXED RESTRICTIVE AND OBSTRUCTIVE LUNG DISEASE: ICD-10-CM

## 2018-05-21 DIAGNOSIS — E66.9 DIABETES MELLITUS TYPE 2 IN OBESE: Primary | ICD-10-CM

## 2018-05-21 DIAGNOSIS — D64.9 ANEMIA, UNSPECIFIED TYPE: ICD-10-CM

## 2018-05-21 DIAGNOSIS — J44.9 COPD, MODERATE: ICD-10-CM

## 2018-05-21 DIAGNOSIS — J98.4 MIXED RESTRICTIVE AND OBSTRUCTIVE LUNG DISEASE: ICD-10-CM

## 2018-05-21 LAB
ALBUMIN SERPL BCP-MCNC: 3.7 G/DL
ANION GAP SERPL CALC-SCNC: 11 MMOL/L
ANISOCYTOSIS BLD QL SMEAR: SLIGHT
BASOPHILS # BLD AUTO: 0.03 K/UL
BASOPHILS NFR BLD: 0.5 %
BILIRUB UR QL STRIP: NEGATIVE
BUN SERPL-MCNC: 26 MG/DL
BURR CELLS BLD QL SMEAR: ABNORMAL
CALCIUM SERPL-MCNC: 9.6 MG/DL
CHLORIDE SERPL-SCNC: 108 MMOL/L
CLARITY UR REFRACT.AUTO: ABNORMAL
CO2 SERPL-SCNC: 21 MMOL/L
COLOR UR AUTO: ABNORMAL
CREAT SERPL-MCNC: 1.7 MG/DL
DACRYOCYTES BLD QL SMEAR: ABNORMAL
DIFFERENTIAL METHOD: ABNORMAL
EOSINOPHIL # BLD AUTO: 0.1 K/UL
EOSINOPHIL NFR BLD: 1.5 %
ERYTHROCYTE [DISTWIDTH] IN BLOOD BY AUTOMATED COUNT: 18.9 %
EST. GFR  (AFRICAN AMERICAN): 44.6 ML/MIN/1.73 M^2
EST. GFR  (NON AFRICAN AMERICAN): 38.6 ML/MIN/1.73 M^2
GLUCOSE SERPL-MCNC: 127 MG/DL
GLUCOSE UR QL STRIP: NEGATIVE
HCT VFR BLD AUTO: 21.1 %
HGB BLD-MCNC: 5.8 G/DL
HGB UR QL STRIP: NEGATIVE
HYALINE CASTS UR QL AUTO: 4 /LPF
HYPOCHROMIA BLD QL SMEAR: ABNORMAL
IMM GRANULOCYTES # BLD AUTO: 0.02 K/UL
IMM GRANULOCYTES NFR BLD AUTO: 0.3 %
KETONES UR QL STRIP: NEGATIVE
LEUKOCYTE ESTERASE UR QL STRIP: NEGATIVE
LYMPHOCYTES # BLD AUTO: 0.9 K/UL
LYMPHOCYTES NFR BLD: 14.6 %
MCH RBC QN AUTO: 21.5 PG
MCHC RBC AUTO-ENTMCNC: 27.5 G/DL
MCV RBC AUTO: 78 FL
MICROSCOPIC COMMENT: ABNORMAL
MONOCYTES # BLD AUTO: 0.7 K/UL
MONOCYTES NFR BLD: 11.4 %
NEUTROPHILS # BLD AUTO: 4.3 K/UL
NEUTROPHILS NFR BLD: 71.7 %
NITRITE UR QL STRIP: NEGATIVE
NRBC BLD-RTO: 0 /100 WBC
OVALOCYTES BLD QL SMEAR: ABNORMAL
PH UR STRIP: 6 [PH] (ref 5–8)
PHOSPHATE SERPL-MCNC: 3.1 MG/DL
PLATELET # BLD AUTO: 350 K/UL
PLATELET BLD QL SMEAR: ABNORMAL
PMV BLD AUTO: 10.1 FL
POIKILOCYTOSIS BLD QL SMEAR: SLIGHT
POLYCHROMASIA BLD QL SMEAR: ABNORMAL
POTASSIUM SERPL-SCNC: 4.3 MMOL/L
PROT UR QL STRIP: NEGATIVE
RBC # BLD AUTO: 2.7 M/UL
RBC #/AREA URNS AUTO: 0 /HPF (ref 0–4)
SODIUM SERPL-SCNC: 140 MMOL/L
SP GR UR STRIP: 1.01 (ref 1–1.03)
SPHEROCYTES BLD QL SMEAR: ABNORMAL
STOMATOCYTES BLD QL SMEAR: PRESENT
TARGETS BLD QL SMEAR: ABNORMAL
URN SPEC COLLECT METH UR: ABNORMAL
UROBILINOGEN UR STRIP-ACNC: NEGATIVE EU/DL
WBC # BLD AUTO: 5.96 K/UL
WBC #/AREA URNS AUTO: 1 /HPF (ref 0–5)

## 2018-05-21 PROCEDURE — 99999 PR PBB SHADOW E&M-EST. PATIENT-LVL IV: CPT | Mod: PBBFAC,,, | Performed by: FAMILY MEDICINE

## 2018-05-21 PROCEDURE — 3074F SYST BP LT 130 MM HG: CPT | Mod: CPTII,S$GLB,, | Performed by: FAMILY MEDICINE

## 2018-05-21 PROCEDURE — 85025 COMPLETE CBC W/AUTO DIFF WBC: CPT

## 2018-05-21 PROCEDURE — 99499 UNLISTED E&M SERVICE: CPT | Mod: S$PBB,,, | Performed by: FAMILY MEDICINE

## 2018-05-21 PROCEDURE — 99214 OFFICE O/P EST MOD 30 MIN: CPT | Mod: S$GLB,,, | Performed by: FAMILY MEDICINE

## 2018-05-21 PROCEDURE — 81001 URINALYSIS AUTO W/SCOPE: CPT

## 2018-05-21 PROCEDURE — 3044F HG A1C LEVEL LT 7.0%: CPT | Mod: CPTII,S$GLB,, | Performed by: FAMILY MEDICINE

## 2018-05-21 PROCEDURE — 3078F DIAST BP <80 MM HG: CPT | Mod: CPTII,S$GLB,, | Performed by: FAMILY MEDICINE

## 2018-05-21 PROCEDURE — 80069 RENAL FUNCTION PANEL: CPT

## 2018-05-21 PROCEDURE — 36415 COLL VENOUS BLD VENIPUNCTURE: CPT | Mod: PO

## 2018-05-21 NOTE — PROGRESS NOTES
Subjective:       Patient ID: Jake Ortiz is a 77 y.o. male.    Chief Complaint: Shortness of Breath    Shortness of Breath   This is a new problem. The current episode started 1 to 4 weeks ago. The problem occurs constantly. The problem has been unchanged. The average episode lasts 3 minutes. Pertinent negatives include no abdominal pain, chest pain, claudication, coryza, ear pain, fever, headaches, hemoptysis, leg pain, leg swelling, neck pain, orthopnea, PND, rash, rhinorrhea, sore throat, sputum production, swollen glands, syncope, vomiting or wheezing. The symptoms are aggravated by exercise. The patient has no known risk factors for DVT/PE. He has tried rest for the symptoms. His past medical history is significant for CAD, COPD, a heart failure and pneumonia. There is no history of allergies, aspirin allergies, asthma, bronchiolitis, chronic lung disease, DVT, PE or a recent surgery.   Patient is accompanied today by his wife. Since last visit he notes a diagnosis of PNA which led to further testing per Pulmonology and a new diagnosis of COPD as well as nocturnal hypoxemia. He reports persistent fatigue with accompanying dyspnea on exertion. Notes that visits with Pulmonology and Cardiology could not account of symptoms apart from the anemia he has been found to have. He has not had CBC since February 2018 at which time levels were 9.6 and 31.7. Records from his Heme/Onc (Dr. No at [facility]- history of prostate cancer) from September 2017 demonstrate H&H of 10.6 and 30.8 in September 2017. At this time he does not have any follow-up visit with Heme/Onc scheduled but he has been referred within [facility] and is scheduled to see NP Del [place] on 5/22/18. Patient's wife reports noting a pale appearance. His Cardiologist has changed from Xarelto to Eliquis recently and has discontinued ASA. Patient denies any rectal bleeding or melena and has not appreciated any hematuria. He had previously been given a  "FITKIT but did not complete. He has never had colonoscopy. Recent BMP is concerning for VAIBHAV with creatinine elevated at 1.7. Current A1c levels are measured at 6.8. He has been maintaining a blood glucose log with generally stable home readings. AM fasting levels have ranged from 130-203 with an average in the 140's and PM readings from 115-202 with an average in the 130's. No symptoms of hyper or hypoglycemia are reported. Since being diagnosed with PNA he has lost 10 pounds. He has been maintaining a record of daily weights with no significant changes this month noted.     Review of Systems   Constitutional: Positive for activity change and fatigue. Negative for fever.   HENT: Negative for ear pain, rhinorrhea and sore throat.    Eyes: Negative for visual disturbance.   Respiratory: Positive for shortness of breath. Negative for hemoptysis, sputum production, chest tightness and wheezing.    Cardiovascular: Negative for chest pain, orthopnea, claudication, leg swelling, syncope and PND.   Gastrointestinal: Negative for abdominal pain, anal bleeding, blood in stool, constipation, diarrhea and vomiting.   Endocrine: Negative for polydipsia, polyphagia and polyuria.   Genitourinary: Negative for decreased urine volume, difficulty urinating and hematuria.   Musculoskeletal: Negative for arthralgias and neck pain.   Skin: Positive for pallor. Negative for rash.   Neurological: Positive for dizziness and light-headedness. Negative for weakness and headaches.   Hematological: Bruises/bleeds easily.   Psychiatric/Behavioral: Positive for sleep disturbance. Negative for dysphoric mood.       Objective:   BP (!) 110/54   Pulse 82   Temp 97.3 °F (36.3 °C) (Tympanic)   Resp 18   Ht 5' 9" (1.753 m)   Wt 91.1 kg (200 lb 13.4 oz)   SpO2 100%   BMI 29.66 kg/m²   Physical Exam   Constitutional: He is oriented to person, place, and time. He appears well-developed and well-nourished. No distress.   HENT:   Head: Normocephalic " and atraumatic.   Right Ear: Tympanic membrane, external ear and ear canal normal.   Left Ear: Tympanic membrane, external ear and ear canal normal.   Nose: Nose normal.   Mouth/Throat: Oropharynx is clear and moist.   Eyes: Conjunctivae and EOM are normal. Pupils are equal, round, and reactive to light.   Neck: Normal range of motion. Neck supple.   Cardiovascular: Normal rate, regular rhythm and normal heart sounds.    Pulmonary/Chest: Effort normal and breath sounds normal.   Abdominal: Soft. Bowel sounds are normal. He exhibits no distension. There is no tenderness.   Musculoskeletal: He exhibits no edema.   Neurological: He is alert and oriented to person, place, and time.   Skin: Skin is warm and dry. He is not diaphoretic. There is pallor.   Psychiatric: He has a normal mood and affect. His behavior is normal.       Assessment:       1. Diabetes mellitus type 2 in obese    2. Anemia, unspecified type    3. Acute kidney injury    4. Atrial fibrillation, unspecified type    5. Congestive heart failure, unspecified HF chronicity, unspecified heart failure type    6. Mixed restrictive and obstructive lung disease    7. COPD, moderate    8. Nocturnal hypoxemia    9. Granulomatous disease    10. Adenocarcinoma of prostate    11. Arteriosclerosis of aorta    12. Long term current use of anticoagulant        Plan:      Diabetes mellitus type 2 in obese  Reviewed home glucose log as well as recent labs. Given his underlying anemia his current A1c is not entirely reflective of glycemic control. Reviewed target glucose goals. Have encouraged continued lifestyle and dietary modification as per diabetic education. Will plan on reassessment of A1c in the near future. Proceed with additional labs as below. Annual diabetic eye and foot examinations remain advised.    Anemia, unspecified type  Spent length of time today discussing anemia. He has a number of symptoms that raise concern for potential worsening since last  assessment in February. In spite of multiple visits with other providers he has not had recent CBC. Will obtain. After detailed discussion regarding his current Heme/Onc specialist versus being seen within [facility] he has elected to return to Dr. No for follow-up. Arrangements have been made for a visit later this week. Have asked that records from that visit be forwarded.   -     CBC auto differential; Future; Expected date: 05/21/2018    Acute kidney injury  -     Urinalysis; Future; Expected date: 05/21/2018  -     Renal function panel; Future; Expected date: 05/21/2018  -     Ambulatory Referral to Nephrology    Atrial fibrillation, unspecified type  Rate controlled. Continue with current treatment and follow-up recommendations as per Cardiology (Dr. Menendez).     Congestive heart failure, unspecified HF chronicity, unspecified heart failure type  Continue with current treatment and follow-up recommendations as per Cardiology (Dr. Menendez).  Mixed restrictive and obstructive lung disease  Continue with current treatment and follow-up recommendations as per Pulmonology.    COPD, moderate  Continue with current treatment and follow-up recommendations as per Pulmonology.    Nocturnal hypoxemia  Continue with current treatment and follow-up recommendations as per Pulmonology.    Granulomatous disease  Continue with current treatment and follow-up recommendations as per Pulmonology.    Adenocarcinoma of prostate  As above.    Arteriosclerosis of aorta  BP and lipid control remain advised.    Long term current use of anticoagulant  Continue with current treatment and follow-up recommendations as per Cardiology (Dr. Menendez).    Total visit time of 30 minutes with greater than half the visit dedicated to counseling and coordinating care.

## 2018-05-22 ENCOUNTER — PATIENT MESSAGE (OUTPATIENT)
Dept: INFUSION THERAPY | Facility: HOSPITAL | Age: 78
End: 2018-05-22

## 2018-05-22 NOTE — TELEPHONE ENCOUNTER
ON CALL MD    Called re: critical lab: Hgb 5.8 (prior Hgb 9.6 2/23/18). Spoke with Mr. Ortiz, who has a h/o CAD, DM2, prostate cancer s/p radiation, who reports he is unable to walk or even take a shower without significant SOB. Urged him to go to the ER ASAP for blood transfusion and w/u (no recent colonoscopy) to prevent MI or stroke and he agreed to go tonight.

## 2018-05-28 ENCOUNTER — PATIENT MESSAGE (OUTPATIENT)
Dept: INFUSION THERAPY | Facility: HOSPITAL | Age: 78
End: 2018-05-28

## 2018-05-28 ENCOUNTER — TELEPHONE (OUTPATIENT)
Dept: PULMONOLOGY | Facility: CLINIC | Age: 78
End: 2018-05-28

## 2018-05-28 ENCOUNTER — PROCEDURE VISIT (OUTPATIENT)
Dept: SLEEP MEDICINE | Facility: CLINIC | Age: 78
End: 2018-05-28
Payer: MEDICARE

## 2018-05-28 DIAGNOSIS — G47.33 OBSTRUCTIVE SLEEP APNEA: Primary | ICD-10-CM

## 2018-05-28 DIAGNOSIS — I50.9 CONGESTIVE HEART FAILURE, UNSPECIFIED HF CHRONICITY, UNSPECIFIED HEART FAILURE TYPE: ICD-10-CM

## 2018-05-28 DIAGNOSIS — J98.4 MIXED RESTRICTIVE AND OBSTRUCTIVE LUNG DISEASE: Chronic | ICD-10-CM

## 2018-05-28 DIAGNOSIS — G47.33 OSA (OBSTRUCTIVE SLEEP APNEA): ICD-10-CM

## 2018-05-28 DIAGNOSIS — G47.30 SLEEP-DISORDERED BREATHING: ICD-10-CM

## 2018-05-28 DIAGNOSIS — G47.34 NOCTURNAL HYPOXEMIA: ICD-10-CM

## 2018-05-28 DIAGNOSIS — I48.91 ATRIAL FIBRILLATION, UNSPECIFIED TYPE: ICD-10-CM

## 2018-05-28 DIAGNOSIS — J44.9 COPD, MODERATE: ICD-10-CM

## 2018-05-28 DIAGNOSIS — R06.83 SNORING: ICD-10-CM

## 2018-05-28 DIAGNOSIS — I10 ESSENTIAL HYPERTENSION: ICD-10-CM

## 2018-05-28 DIAGNOSIS — J43.9 MIXED RESTRICTIVE AND OBSTRUCTIVE LUNG DISEASE: Chronic | ICD-10-CM

## 2018-05-28 DIAGNOSIS — I51.7 CARDIOMEGALY: ICD-10-CM

## 2018-05-28 PROCEDURE — 95806 SLEEP STUDY UNATT&RESP EFFT: CPT | Mod: S$GLB,,, | Performed by: INTERNAL MEDICINE

## 2018-05-28 PROCEDURE — 99499 UNLISTED E&M SERVICE: CPT | Mod: S$GLB,,, | Performed by: INTERNAL MEDICINE

## 2018-05-28 NOTE — TELEPHONE ENCOUNTER
Telephoned spoke with patient advised of sleep apnea detected on home sleep study with moderate NASREEN.  With apneic index of 22.1/hr.    Patient is agreeable to proceed with in lab cpap titration with moderate apnea and nocturnal desaturations.     Orders Placed This Encounter   Procedures    CPAP Titration (Must have dx of NASREEN from previous sleep study.)     Standing Status:   Future     Standing Expiration Date:   5/28/2019     1. Obstructive sleep apnea  CPAP Titration (Must have dx of NASREEN from previous sleep study.)     Home Sleep Study 5/27/2018  Assessment and Recommendations  2 Night protocol was used  Data for 2nd night was used;Duration was 7 hrs 15 minutes  SpO2 tamie was 88%  Heart rate variability:  Snoring was recorded above 50  for 97% time  Apnea Hypopnea index: 22.2/hr ( 161 events)  Moderate obstructive sleep apnea  Treatment recomendations:  CPAP would be the guideline recommendation for first-line treatment for obstructive sleep apnea.  Either order in lab CPAP titration or AutoPAP device.  Follow-up evaluation and treatment in the sleep disorder clinic.  Other modalities for treatment of obstructive sleep apnea may be explored in patients with intolerant to CPAP including evaluation by ear nose  and throat, Hypoglosal nerve stimulator ( INSPIRE) mandibular advancement device, nonsupine sleep positioning device.  Significant weight loss is recommended to normal ranges.  Close follow-up to ensure resolution of symptoms.  I

## 2018-05-28 NOTE — Clinical Note
Assessment and Recommendations 2 Night protocol was used Data for 2nd night was used;Duration was 7 hrs 15 minutes SpO2 tamie was 88% Heart rate variability: Snoring was recorded above 50  for 97% time Apnea Hypopnea index: 22.2/hr ( 161 events) Moderate obstructive sleep apnea Treatment recomendations: CPAP would be the guideline recommendation for first-line treatment for obstructive sleep apnea. Either order in lab CPAP titration or AutoPAP device. Follow-up evaluation and treatment in the sleep disorder clinic. Other modalities for treatment of obstructive sleep apnea may be explored in patients with intolerant to CPAP including evaluation by ear nose and throat, Hypoglosal nerve stimulator ( INSPIRE) mandibular advancement device, nonsupine sleep positioning device. Significant weight loss is recommended to normal ranges. Close follow-up to ensure resolution of symptoms. I

## 2018-05-29 ENCOUNTER — PATIENT OUTREACH (OUTPATIENT)
Dept: ADMINISTRATIVE | Facility: HOSPITAL | Age: 78
End: 2018-05-29

## 2018-05-30 ENCOUNTER — HOSPITAL ENCOUNTER (OUTPATIENT)
Dept: RADIOLOGY | Facility: HOSPITAL | Age: 78
Discharge: HOME | End: 2018-05-30
Attending: FAMILY MEDICINE
Payer: MEDICARE

## 2018-05-30 ENCOUNTER — OFFICE VISIT (OUTPATIENT)
Dept: FAMILY MEDICINE | Facility: CLINIC | Age: 78
End: 2018-05-30
Payer: MEDICARE

## 2018-05-30 ENCOUNTER — TELEPHONE (OUTPATIENT)
Dept: FAMILY MEDICINE | Facility: CLINIC | Age: 78
End: 2018-05-30

## 2018-05-30 VITALS
DIASTOLIC BLOOD PRESSURE: 66 MMHG | WEIGHT: 197.44 LBS | SYSTOLIC BLOOD PRESSURE: 118 MMHG | TEMPERATURE: 97 F | OXYGEN SATURATION: 100 % | HEART RATE: 76 BPM | BODY MASS INDEX: 29.24 KG/M2 | HEIGHT: 69 IN | RESPIRATION RATE: 18 BRPM

## 2018-05-30 DIAGNOSIS — G89.29 CHRONIC LOW BACK PAIN WITHOUT SCIATICA, UNSPECIFIED BACK PAIN LATERALITY: ICD-10-CM

## 2018-05-30 DIAGNOSIS — D71 GRANULOMATOUS DISEASE: ICD-10-CM

## 2018-05-30 DIAGNOSIS — I50.9 CONGESTIVE HEART FAILURE, UNSPECIFIED HF CHRONICITY, UNSPECIFIED HEART FAILURE TYPE: ICD-10-CM

## 2018-05-30 DIAGNOSIS — E66.9 DIABETES MELLITUS TYPE 2 IN OBESE: ICD-10-CM

## 2018-05-30 DIAGNOSIS — E11.69 DIABETES MELLITUS TYPE 2 IN OBESE: ICD-10-CM

## 2018-05-30 DIAGNOSIS — Z95.1 S/P CABG X 3: ICD-10-CM

## 2018-05-30 DIAGNOSIS — J43.9 MIXED RESTRICTIVE AND OBSTRUCTIVE LUNG DISEASE: ICD-10-CM

## 2018-05-30 DIAGNOSIS — I70.0 ARTERIOSCLEROSIS OF AORTA: ICD-10-CM

## 2018-05-30 DIAGNOSIS — J44.9 COPD, MODERATE: ICD-10-CM

## 2018-05-30 DIAGNOSIS — M54.50 CHRONIC LOW BACK PAIN WITHOUT SCIATICA, UNSPECIFIED BACK PAIN LATERALITY: ICD-10-CM

## 2018-05-30 DIAGNOSIS — C61 ADENOCARCINOMA OF PROSTATE: ICD-10-CM

## 2018-05-30 DIAGNOSIS — G47.9 TROUBLE IN SLEEPING: ICD-10-CM

## 2018-05-30 DIAGNOSIS — Z95.0 PACEMAKER: ICD-10-CM

## 2018-05-30 DIAGNOSIS — J98.4 MIXED RESTRICTIVE AND OBSTRUCTIVE LUNG DISEASE: ICD-10-CM

## 2018-05-30 DIAGNOSIS — N17.9 ACUTE KIDNEY INJURY: ICD-10-CM

## 2018-05-30 DIAGNOSIS — I48.91 ATRIAL FIBRILLATION, UNSPECIFIED TYPE: ICD-10-CM

## 2018-05-30 DIAGNOSIS — D64.9 SYMPTOMATIC ANEMIA: Primary | ICD-10-CM

## 2018-05-30 PROCEDURE — 99499 UNLISTED E&M SERVICE: CPT | Mod: S$PBB,,, | Performed by: FAMILY MEDICINE

## 2018-05-30 PROCEDURE — 3074F SYST BP LT 130 MM HG: CPT | Mod: CPTII,S$GLB,, | Performed by: FAMILY MEDICINE

## 2018-05-30 PROCEDURE — 3078F DIAST BP <80 MM HG: CPT | Mod: CPTII,S$GLB,, | Performed by: FAMILY MEDICINE

## 2018-05-30 PROCEDURE — 99999 PR PBB SHADOW E&M-EST. PATIENT-LVL IV: CPT | Mod: PBBFAC,,, | Performed by: FAMILY MEDICINE

## 2018-05-30 PROCEDURE — 99214 OFFICE O/P EST MOD 30 MIN: CPT | Mod: S$GLB,,, | Performed by: FAMILY MEDICINE

## 2018-05-30 PROCEDURE — 3044F HG A1C LEVEL LT 7.0%: CPT | Mod: CPTII,S$GLB,, | Performed by: FAMILY MEDICINE

## 2018-05-30 PROCEDURE — 72100 X-RAY EXAM L-S SPINE 2/3 VWS: CPT | Mod: 26,,, | Performed by: RADIOLOGY

## 2018-05-30 PROCEDURE — 72100 X-RAY EXAM L-S SPINE 2/3 VWS: CPT | Mod: TC,PO

## 2018-05-30 RX ORDER — ASPIRIN 81 MG/1
81 TABLET ORAL
COMMUNITY
Start: 2018-05-23 | End: 2020-11-03

## 2018-05-30 RX ORDER — LISINOPRIL AND HYDROCHLOROTHIAZIDE 20; 25 MG/1; MG/1
TABLET ORAL
Refills: 3 | COMMUNITY
Start: 2018-05-21 | End: 2018-06-13

## 2018-05-30 NOTE — PROGRESS NOTES
Subjective:       Patient ID: Jake Ortiz is a 77 y.o. male.    Chief Complaint: Hospital Follow Up    HPI   Hospital Follow Up  78yo male presents today for follow-up after hospitalization at [facility] for symptomatic anemia. Patient had presented to the clinic on 5/21/18 with report of shortness of breath. H&H was measured at 5.8 and 21.1. He was admitted for workup including EGD and C scope with reportedly negative findings. During admission he received 3 units pRBC's. Last H&H on file prior to discharge was 7.9 and 24.1- he notes receiving an additional unit (his third) after these results. Patient is off Eliquis currently but is taking ASA 81,g. He will see Cardiology on 6/12/18 for follow-up. Plan is for outpatient VCE and further assessment with Hematology. He is no longer experiencing shortness of breath and denies any dizziness or lightheaded sensation. There is no report of bruising or bleeding and he denies any rectal bleeding or hematuria. He is complaining of low back pain and notes longstanding chronic issues for which he has previously undergone surgical intervention. There are no radicular symptoms reported. Last imaging of the L spine was in 2011.     Review of Systems   Constitutional: Negative for activity change, fatigue and unexpected weight change.   HENT: Negative for hearing loss, rhinorrhea and trouble swallowing.    Eyes: Negative for discharge and visual disturbance.   Respiratory: Negative for chest tightness and wheezing.    Cardiovascular: Negative for chest pain and palpitations.   Gastrointestinal: Negative for anal bleeding, blood in stool, constipation, diarrhea and vomiting.   Endocrine: Negative for polydipsia and polyuria.   Genitourinary: Negative for difficulty urinating, hematuria and urgency.   Musculoskeletal: Negative for arthralgias, joint swelling and neck pain.   Skin: Negative for pallor and rash.   Neurological: Negative for dizziness, weakness and headaches.  "  Hematological: Does not bruise/bleed easily.   Psychiatric/Behavioral: Positive for sleep disturbance. Negative for confusion and dysphoric mood. The patient is not nervous/anxious.        Objective:   /66   Pulse 76   Temp 97.4 °F (36.3 °C) (Temporal)   Resp 18   Ht 5' 9" (1.753 m)   Wt 89.5 kg (197 lb 6.8 oz)   SpO2 100%   BMI 29.15 kg/m²   Physical Exam   Constitutional: He is oriented to person, place, and time. He appears well-developed and well-nourished. No distress.   HENT:   Head: Normocephalic and atraumatic.   Right Ear: Tympanic membrane, external ear and ear canal normal.   Left Ear: Tympanic membrane, external ear and ear canal normal.   Nose: Nose normal.   Mouth/Throat: Oropharynx is clear and moist.   Eyes: Conjunctivae and EOM are normal. Pupils are equal, round, and reactive to light.   Neck: Normal range of motion. Neck supple.   Cardiovascular: Normal rate, regular rhythm and normal heart sounds.    Pulmonary/Chest: Effort normal and breath sounds normal.   Abdominal: Soft. Bowel sounds are normal.   Musculoskeletal: He exhibits no edema.   Neurological: He is alert and oriented to person, place, and time.   Skin: Skin is warm and dry. No rash noted. He is not diaphoretic.   Psychiatric: He has a normal mood and affect. His behavior is normal.       Assessment:       1. Symptomatic anemia    2. Atrial fibrillation, unspecified type    3. Acute kidney injury    4. Trouble in sleeping    5. Chronic low back pain without sciatica, unspecified back pain laterality    6. COPD, moderate    7. Mixed restrictive and obstructive lung disease    8. Granulomatous disease    9. Diabetes mellitus type 2 in obese    10. Adenocarcinoma of prostate    11. Congestive heart failure, unspecified HF chronicity, unspecified heart failure type    12. Arteriosclerosis of aorta    13. S/P CABG x 3    14. Pacemaker        Plan:      Symptomatic anemia  Current CBC with stable H&H at 10.6 and 36.5. " Reviewed hospital records with the patient. Have advised pursuing further evaluation with VCE as well as outpatient Hematology consultation. Dr. Shah's office has been contacted- will inquire within [facility] for GI options for VCE- further consultation may be necessary.   -     CBC auto differential; Future; Expected date: 05/30/2018    Atrial fibrillation, unspecified type  Rate controlled. Currently off Eliquis but taking ASA as instructed. Advised continuation of current treatment and follow-up recommendations as per Cardiology.    Acute kidney injury  Resolved. Creatinine has improved from 1.7 to 1.4 currently. Advised maintaining hydration.  -     Basic metabolic panel; Future; Expected date: 05/30/2018    Trouble in sleeping  Awaiting further assessment per Pulmonology for sleep study. Discussed Melatonin use and reviewed sleep hygiene.    Chronic low back pain without sciatica, unspecified back pain laterality  Abnormal xray findings that appear chronic in nature with possible new compression fracture. Patient will be scheduled with [facility] and will likely need CT scan for further characterization.  -     X-Ray Lumbar Spine AP And Lateral; Future; Expected date: 05/30/2018    COPD, moderate  Stable. Continue with current treatment and follow-up recommendations as per Pulmonology.    Mixed restrictive and obstructive lung disease  Stable. Continue with current treatment and follow-up recommendations as per Pulmonology.    Granulomatous disease  Continue with current treatment and follow-up recommendations as per Pulmonology.    Diabetes mellitus type 2 in obese  Diet controlled. No hyperglycemia reported during admission. Continue with dietary modification.    Adenocarcinoma of prostate  Continue with current treatment and follow-up recommendations per Oncology.    Congestive heart failure, unspecified HF chronicity, unspecified heart failure type  Continue with current treatment and  follow-up recommendations as per Cardiology.    Arteriosclerosis of aorta  BP and lipid control remain advised.    S/P CABG x 3  Continue as per Cardiology.    Pacemaker  Continue as per Cardiology.    Total visit time of 25 minutes with greater than half the visit dedicated to counseling and coordinating care.

## 2018-05-30 NOTE — TELEPHONE ENCOUNTER
Spoke to sarkis. She states that they do not do the video capsule endoscopy in the office and pt has to get it done at [facility] or [facility]. Sarkis states she is going to contact the pt early next week with all the info and to schedule the procedure

## 2018-05-30 NOTE — TELEPHONE ENCOUNTER
Call Dr. Sindy Shah's office to speak with Marlyn or nurse about follow-up outpatient. Patient is supposed to have video capsule endoscopy.

## 2018-05-30 NOTE — TELEPHONE ENCOUNTER
Spoke to Dr ziegler nurse. She states that Dr ziegler at [facility] is not a hematologist and that he was treated there just for prostate cancer and that he will need to establish care with a hematologist.

## 2018-06-04 ENCOUNTER — PATIENT MESSAGE (OUTPATIENT)
Dept: FAMILY MEDICINE | Facility: CLINIC | Age: 78
End: 2018-06-04

## 2018-06-05 DIAGNOSIS — D64.9 ANEMIA, UNSPECIFIED TYPE: Primary | ICD-10-CM

## 2018-06-05 PROBLEM — G47.33 OSA (OBSTRUCTIVE SLEEP APNEA): Status: ACTIVE | Noted: 2018-06-05

## 2018-06-05 NOTE — PROCEDURES
Home Sleep Studies  Date/Time: 6/5/2018 1:32 PM  Performed by: NELSY MARINELLI  Authorized by: SURYA PALOMARES       Assessment and Recommendations  2 Night protocol was used  Data for 2nd night was used;Duration was 7 hrs 15 minutes  SpO2 tamie was 88%  Heart rate variability:  Snoring was recorded above 50  for 97% time  Apnea Hypopnea index: 22.2/hr ( 161 events)  Moderate obstructive sleep apnea  Treatment recomendations:  CPAP would be the guideline recommendation for first-line treatment for obstructive sleep apnea.  Either order in lab CPAP titration or AutoPAP device.  Follow-up evaluation and treatment in the sleep disorder clinic.  Other modalities for treatment of obstructive sleep apnea may be explored in patients with intolerant to CPAP including evaluation by ear nose  and throat, Hypoglosal nerve stimulator ( INSPIRE) mandibular advancement device, nonsupine sleep positioning device.  Significant weight loss is recommended to normal ranges.  Close follow-up to ensure resolution of symptoms.  I

## 2018-06-06 ENCOUNTER — TELEPHONE (OUTPATIENT)
Dept: PULMONOLOGY | Facility: CLINIC | Age: 78
End: 2018-06-06

## 2018-06-06 DIAGNOSIS — G47.33 OSA (OBSTRUCTIVE SLEEP APNEA): Primary | ICD-10-CM

## 2018-06-06 NOTE — TELEPHONE ENCOUNTER
Home sleep study Results reviewed:     2 Night protocol was used  Data for 2nd night was used;Duration was 7 hrs 15 minutes  SpO2 tamie was 88%  Heart rate variability:  Snoring was recorded above 50  for 97% time  Apnea Hypopnea index: 22.2/hr ( 161 events)    Assessment:    Moderate obstructive sleep apnea      Plan:     CPAP titration sleep study       Ordered Please inform pateint

## 2018-06-13 ENCOUNTER — OFFICE VISIT (OUTPATIENT)
Dept: PULMONOLOGY | Facility: CLINIC | Age: 78
End: 2018-06-13
Payer: MEDICARE

## 2018-06-13 ENCOUNTER — PATIENT MESSAGE (OUTPATIENT)
Dept: PULMONOLOGY | Facility: CLINIC | Age: 78
End: 2018-06-13

## 2018-06-13 ENCOUNTER — PROCEDURE VISIT (OUTPATIENT)
Dept: PULMONOLOGY | Facility: CLINIC | Age: 78
End: 2018-06-13
Payer: MEDICARE

## 2018-06-13 ENCOUNTER — INITIAL CONSULT (OUTPATIENT)
Dept: HEMATOLOGY/ONCOLOGY | Facility: CLINIC | Age: 78
End: 2018-06-13
Payer: MEDICARE

## 2018-06-13 VITALS
BODY MASS INDEX: 28.77 KG/M2 | TEMPERATURE: 98 F | DIASTOLIC BLOOD PRESSURE: 69 MMHG | OXYGEN SATURATION: 98 % | SYSTOLIC BLOOD PRESSURE: 119 MMHG | HEART RATE: 89 BPM | WEIGHT: 194.25 LBS | HEIGHT: 69 IN

## 2018-06-13 VITALS
HEART RATE: 78 BPM | SYSTOLIC BLOOD PRESSURE: 120 MMHG | RESPIRATION RATE: 18 BRPM | DIASTOLIC BLOOD PRESSURE: 80 MMHG | OXYGEN SATURATION: 98 % | HEIGHT: 69 IN | BODY MASS INDEX: 28.88 KG/M2 | WEIGHT: 195 LBS

## 2018-06-13 DIAGNOSIS — J43.9 MIXED RESTRICTIVE AND OBSTRUCTIVE LUNG DISEASE: Chronic | ICD-10-CM

## 2018-06-13 DIAGNOSIS — J98.4 MIXED RESTRICTIVE AND OBSTRUCTIVE LUNG DISEASE: Chronic | ICD-10-CM

## 2018-06-13 DIAGNOSIS — G47.33 OSA (OBSTRUCTIVE SLEEP APNEA): Primary | Chronic | ICD-10-CM

## 2018-06-13 DIAGNOSIS — J98.4 CHRONIC RESTRICTIVE LUNG DISEASE: Chronic | ICD-10-CM

## 2018-06-13 DIAGNOSIS — K92.1 GASTROINTESTINAL HEMORRHAGE WITH MELENA: ICD-10-CM

## 2018-06-13 DIAGNOSIS — D50.0 IRON DEFICIENCY ANEMIA DUE TO CHRONIC BLOOD LOSS: Primary | ICD-10-CM

## 2018-06-13 LAB
BRPFT: ABNORMAL
DLCO ADJ PRE: 15.58 ML/(MIN*MMHG) (ref 18.34–32.2)
DLCO PRE: 13.49 ML/(MIN*MMHG) (ref 18.34–32.2)
DLCO SINGLE BREATH LLN: 18.34
DLCO SINGLE BREATH PRE REF: 53.4 %
DLCO SINGLE BREATH REF: 25.27
DLCOC SBVA LLN: 2.48
DLCOC SBVA PRE REF: 101.8 %
DLCOC SBVA REF: 3.66
DLCOC SINGLE BREATH LLN: 18.34
DLCOC SINGLE BREATH PRE REF: 61.7 %
DLCOC SINGLE BREATH REF: 25.27
DLCOVA LLN: 2.48
DLCOVA PRE REF: 88.1 %
DLCOVA PRE: 3.23 ML/(MIN*MMHG*L) (ref 2.48–4.84)
DLCOVA REF: 3.66
DLVAADJ PRE: 3.73 ML/(MIN*MMHG*L) (ref 2.48–4.84)
ERV LLN: 0.97
ERV PRE REF: 95.4 %
ERV PRE: 0.92 L (ref 0.97–0.97)
ERV REF: 0.97
ERVN2 LLN: 0.97
ERVN2 REF: 0.97
FEF 25 75 CHG: 20.3 %
FEF 25 75 LLN: 0.85
FEF 25 75 POST REF: 81.9 %
FEF 25 75 POST: 1.74 L/S (ref 0.85–3.4)
FEF 25 75 PRE REF: 68.1 %
FEF 25 75 PRE: 1.45 L/S (ref 0.85–3.4)
FEF 25 75 REF: 2.12
FET100 CHG: -10.5 %
FET100 POST: 10.48 SEC
FET100 PRE: 11.7 SEC
FEV1 CHG: 2.7 %
FEV1 FVC CHG: 3.6 %
FEV1 FVC LLN: 61
FEV1 FVC POST REF: 97.9 %
FEV1 FVC POST: 73.73 % (ref 61.1–89.6)
FEV1 FVC PRE REF: 94.4 %
FEV1 FVC PRE: 71.14 % (ref 61.1–89.6)
FEV1 FVC REF: 75
FEV1 LLN: 2.05
FEV1 POST REF: 82.5 %
FEV1 POST: 2.39 L (ref 2.05–3.76)
FEV1 PRE REF: 80.3 %
FEV1 PRE: 2.33 L (ref 2.05–3.76)
FEV1 REF: 2.9
FEV6 CHG: 0.3 %
FEV6 LLN: 2.92
FEV6 POST REF: 81.8 %
FEV6 POST: 3.1 L (ref 2.92–4.67)
FEV6 PRE REF: 81.5 %
FEV6 PRE: 3.09 L (ref 2.92–4.67)
FEV6 REF: 3.79
FRCN2 LLN: 2.69
FRCN2 REF: 3.68
FRCPL PRE: 2.63 L
FRCPLETH LLN: 2.69
FRCPLETH PREREF: 71.4 %
FRCPLETH REF: 3.68
FVC CHG: -0.9 %
FVC LLN: 2.83
FVC POST REF: 83.7 %
FVC POST: 3.25 L (ref 2.83–4.93)
FVC PRE REF: 84.5 %
FVC PRE: 3.28 L (ref 2.83–4.93)
FVC REF: 3.88
IVC PRE: 2.91 L (ref 2.83–4.93)
IVC SINGLE BREATH LLN: 2.83
IVC SINGLE BREATH PRE REF: 75 %
IVC SINGLE BREATH REF: 3.88
MVV LLN: 97
MVV REF: 114
PEF CHG: -3.3 %
PEF LLN: 5.34
PEF POST REF: 82.6 %
PEF POST: 6.27 L/S (ref 5.34–9.83)
PEF PRE REF: 85.5 %
PEF PRE: 6.48 L/S (ref 5.34–9.83)
PEF REF: 7.58
RAW LLN: 3.06
RAW PRE REF: 61.2 %
RAW PRE: 1.87 CMH2O*S/L (ref 3.06–3.06)
RAW REF: 3.06
RV LLN: 2.04
RV PRE REF: 57.5 %
RV PRE: 1.56 L (ref 2.04–3.39)
RV REF: 2.71
RVN2 LLN: 2.04
RVN2 REF: 2.71
RVN2TLCN2 LLN: 34
RVN2TLCN2 REF: 43
RVTLC LLN: 34
RVTLC PRE REF: 74.6 %
RVTLC PRE: 32.24 % (ref 34.23–52.19)
RVTLC REF: 43
TLC LLN: 5.75
TLC PRE REF: 70.1 %
TLC PRE: 4.84 L (ref 5.75–8.05)
TLC REF: 6.9
TLCN2 LLN: 5.75
TLCN2 REF: 6.9
VA PRE: 4.18 L (ref 6.75–6.75)
VA SINGLE BREATH LLN: 6.75
VA SINGLE BREATH PRE REF: 61.9 %
VA SINGLE BREATH REF: 6.75
VC LLN: 2.83
VC PRE REF: 84.5 %
VC PRE: 3.28 L (ref 2.83–4.93)
VC REF: 3.88
VCMAXN2 LLN: 2.83
VCMAXN2 REF: 3.88
VTGRAWPRE: 2.97 L

## 2018-06-13 PROCEDURE — 94726 PLETHYSMOGRAPHY LUNG VOLUMES: CPT | Mod: S$GLB,,, | Performed by: INTERNAL MEDICINE

## 2018-06-13 PROCEDURE — 94060 EVALUATION OF WHEEZING: CPT | Mod: S$GLB,,, | Performed by: INTERNAL MEDICINE

## 2018-06-13 PROCEDURE — 3074F SYST BP LT 130 MM HG: CPT | Mod: CPTII,S$GLB,, | Performed by: INTERNAL MEDICINE

## 2018-06-13 PROCEDURE — 99999 PR PBB SHADOW E&M-EST. PATIENT-LVL III: CPT | Mod: PBBFAC,,, | Performed by: INTERNAL MEDICINE

## 2018-06-13 PROCEDURE — 94729 DIFFUSING CAPACITY: CPT | Mod: S$GLB,,, | Performed by: INTERNAL MEDICINE

## 2018-06-13 PROCEDURE — 3078F DIAST BP <80 MM HG: CPT | Mod: CPTII,S$GLB,, | Performed by: INTERNAL MEDICINE

## 2018-06-13 PROCEDURE — 99205 OFFICE O/P NEW HI 60 MIN: CPT | Mod: S$GLB,,, | Performed by: INTERNAL MEDICINE

## 2018-06-13 PROCEDURE — 99215 OFFICE O/P EST HI 40 MIN: CPT | Mod: 25,S$GLB,, | Performed by: INTERNAL MEDICINE

## 2018-06-13 PROCEDURE — 3079F DIAST BP 80-89 MM HG: CPT | Mod: CPTII,S$GLB,, | Performed by: INTERNAL MEDICINE

## 2018-06-13 RX ORDER — NITROGLYCERIN 0.3 MG/1
TABLET SUBLINGUAL
Refills: 0 | Status: ON HOLD | COMMUNITY
Start: 2018-06-10 | End: 2020-10-12 | Stop reason: HOSPADM

## 2018-06-13 RX ORDER — SODIUM CHLORIDE 0.9 % (FLUSH) 0.9 %
10 SYRINGE (ML) INJECTION
Status: CANCELLED | OUTPATIENT
Start: 2018-06-27

## 2018-06-13 RX ORDER — PANTOPRAZOLE SODIUM 40 MG/1
TABLET, DELAYED RELEASE ORAL
COMMUNITY
Start: 2018-06-06 | End: 2018-06-27

## 2018-06-13 RX ORDER — SODIUM CHLORIDE 0.9 % (FLUSH) 0.9 %
10 SYRINGE (ML) INJECTION
Status: CANCELLED | OUTPATIENT
Start: 2018-06-18

## 2018-06-13 RX ORDER — LISINOPRIL 5 MG/1
TABLET ORAL
Refills: 0 | COMMUNITY
Start: 2018-06-10 | End: 2018-11-21

## 2018-06-13 RX ORDER — HEPARIN 100 UNIT/ML
500 SYRINGE INTRAVENOUS
Status: CANCELLED | OUTPATIENT
Start: 2018-06-18

## 2018-06-13 RX ORDER — HEPARIN 100 UNIT/ML
500 SYRINGE INTRAVENOUS
Status: CANCELLED | OUTPATIENT
Start: 2018-06-27

## 2018-06-13 NOTE — PROGRESS NOTES
Subjective:      Patient ID: Jake Ortiz is a 77 y.o. male.  Patient Active Problem List   Diagnosis    Hypertension    Hyperlipemia    Renal insufficiency    Anemia    CAD (coronary artery disease)    MI, old    S/P CABG x 3    DDD (degenerative disc disease), lumbar    Psoriasis    Rheumatoid factor positive    Multiple joint pain    Hyperuricemia    Mixed restrictive and obstructive lung disease    Granulomatous disease    Arteriosclerosis of aorta    Adenocarcinoma of prostate    Atrial fibrillation    Nocturnal hypoxemia    Cardiomegaly    Congestive heart failure    Chronic restrictive lung disease    Gastroesophageal reflux disease    NASREEN (obstructive sleep apnea)    Iron deficiency anemia due to chronic blood loss     Problem list has been reviewed.    Chief Complaint: COPD, moderate and Sleep Apnea      HPI    HST, PFT reviewed with pateint who voiced understanding.     Patients reports NYHA II dyspnea    The patient does not have currently have symptoms / an exacerbation.       No cough, sputum, or hemoptysis, No wheezing and No shortness or breath.     He quit smoking 30 years ago.     COPD QUESTIONNAIRE  How often do you cough?: (P) Almost never  How often do you have phlegm (mucus) in your chest?: (P) Never  How often does your chest feel tight?: (P) Never  When you walk up a hill or one flight of stairs, how often are you breathless?: (P) Never  How often are you limited doing any activities at home?: (P) Almost never  How often are you confident leaving the house despite your lung condition?: (P) All of the time  How often do you sleep soundly?: (P) All of the time  How often do you have energy?: (P) Most of the time  Total score: (P) 3     HST revealed moderate NASREEN with AHI of 22.2 events per hour.     CPAP titration is scheduled for 07/05/18. Will re evaluate after PAP titration.     [place] Sleepiness Scale   EPWORTH SLEEPINESS SCALE 6/13/2018 4/4/2018   Sitting and  reading 0 0   Watching TV 0 2   Sitting, inactive in a public place (e.g. a theatre or a meeting) 0 0   As a passenger in a car for an hour without a break 0 0   Lying down to rest in the afternoon when circumstances permit 1 2   Sitting and talking to someone 0 0   Sitting quietly after a lunch without alcohol 1 2   In a car, while stopped for a few minutes in traffic 0 0   Total score 2 6      A full  review of systems, past , family  and social histories was performed except as mentioned in the note above, these are non contributory to the main issues discussed today.       Previous Report Reviewed: lab reports, office notes and radiology reports     The following portions of the patient's history were reviewed and updated as appropriate: He  has a past medical history of Abnormal PFT; Arthritis; Atrial fibrillation (10/19/2017); Back pain; Congestive heart failure (3/5/2018); Coronary artery disease; Diabetes mellitus (01/2018); Hyperlipemia; Hypertension; Myocardial infarction; Obesity; NASREEN (obstructive sleep apnea) (6/5/2018); Prostate cancer (2015); and Tobacco dependence.  He  has a past surgical history that includes Spine surgery; Tonsillectomy; and Coronary artery bypass graft (1987).  His family history includes Cataracts in his mother; Diabetes in his mother; Heart attack in his mother; Heart disease in his brother, father, and mother; Stroke in his father.  He  reports that he quit smoking about 30 years ago. His smoking use included Cigarettes. He started smoking about 50 years ago. He has a 30.00 pack-year smoking history. He has never used smokeless tobacco. He reports that he does not drink alcohol or use drugs.  He has a current medication list which includes the following prescription(s): aspirin, atorvastatin, blood sugar diagnostic, blood-glucose meter, cholecalciferol (vitamin d3), lancets, lisinopril, multivitamin, nitroglycerin, and pantoprazole.  He is allergic to amiodarone..    Review of  "Systems   Constitutional: Negative for fever, chills and fatigue.   HENT: Negative for nosebleeds, postnasal drip, rhinorrhea, sore throat and congestion.    Respiratory: Positive for cough, sputum production and dyspnea on extertion. Negative for hemoptysis, choking, shortness of breath, orthopnea, previous hospitialization due to pulmonary problems and use of rescue inhaler.    Cardiovascular: Negative for chest pain and leg swelling.   Genitourinary: Negative for difficulty urinating and hematuria.   Musculoskeletal: Negative for arthralgias, back pain and myalgias.   Skin: Negative for rash.   Gastrointestinal: Positive for acid reflux. Negative for nausea, vomiting and abdominal pain.   Neurological: Negative for dizziness, syncope, light-headedness and headaches.   Hematological: Bleeds easily.   Psychiatric/Behavioral: The patient is nervous/anxious.         Objective:   /80   Pulse 78   Resp 18   Ht 5' 9" (1.753 m)   Wt 88.5 kg (195 lb)   SpO2 98%   BMI 28.80 kg/m²   Body mass index is 28.8 kg/m².    Physical Exam   Constitutional: He is oriented to person, place, and time. He appears well-developed and well-nourished. He is active and cooperative.  Non-toxic appearance. He does not appear ill. No distress.   HENT:   Head: Normocephalic and atraumatic.   Right Ear: External ear normal.   Left Ear: External ear normal.   Mouth/Throat: No oropharyngeal exudate.   Eyes: Conjunctivae are normal.   Neck: Normal range of motion. Neck supple.   Cardiovascular: Normal rate, regular rhythm and normal heart sounds.    Pulmonary/Chest: Effort normal and breath sounds normal.   Abdominal: Soft.   Musculoskeletal: He exhibits no edema.   Neurological: He is alert and oriented to person, place, and time. He has normal reflexes.   Skin: Skin is warm and dry. Capillary refill takes less than 2 seconds.   Psychiatric: He has a normal mood and affect. His behavior is normal.   Vitals reviewed.      Personal " Diagnostic Review  Pulmonary function tests: FEV1: 2.33  (80.3 % predicted), FVC:  2.83 (3.28 % predicted), FEV1/FVC:  71, T.84 (70.1 % predicted), RV/TLVC: 32 (74.6 % predicted), DLCO: 13.49 (53.4 % predicted): Normla airflow. Static lung volumes reveal mils restriction. Diffusion capacity is mildly reduced but corrects for alveolar volume.     Assessment / plan:     Discussed diagnosis, its evaluation, treatment and usual course. All questions answered.    Problem List Items Addressed This Visit        Pulmonary    Chronic restrictive lung disease    Current Assessment & Plan     CRLD ROS: No significant ongoing wheezing or shortness of breath,   New concerns: Progressive dyspnea   Exam: appears well, vitals normal, no respiratory distress, acyanotic, normal RR.   Assessment:  CRLD stable.   Plan:  CONTINUE  ANORO.              Other    NASREEN (obstructive sleep apnea) - Primary    Current Assessment & Plan     Awaiting CPAP titration.                TIME SPENT WITH PATIENT: Time spent: 40 minutes in face to face  discussion concerning diagnosis, prognosis, review of lab and test results, benefits of treatment as well as management of disease, counseling of patient and coordination of care between various health  care providers . Greater than half the time spent was used for coordination of care and counseling of patient.     Follow-up in about 8 weeks (around 2018) for NASREEN, Restrictive Lung Disease.

## 2018-06-13 NOTE — ASSESSMENT & PLAN NOTE
CRLD ROS: No significant ongoing wheezing or shortness of breath,   New concerns: Progressive dyspnea   Exam: appears well, vitals normal, no respiratory distress, acyanotic, normal RR.   Assessment:  CRLD stable.   Plan:  CONTINUE  ANORO.

## 2018-06-13 NOTE — PATIENT INSTRUCTIONS
Continuous Positive Air Pressure (CPAP)     A mask over the nose gently directs air into the throat to keep the airway open.   Continuous positive air pressure (CPAP) uses gentle air pressure to hold the airway open. CPAP is often the most effective treatment for sleep apnea and severe snoring. It works very well for many people. But keep in mind that it can take several adjustments before the setup is right for you.  How CPAP works  The CPAP machine  is a small portable pump beside the bed. The pump sends air through a hose, which is held over your nose and mouth by a mask. Mild air pressure is gently pushed through your airway. The air pressure nudges sagging tissues aside. This widens the airway so you can breathe better. CPAP may be combined with other kinds of therapy for sleep apnea.  Types of air pressure treatments  There are different types of CPAP. Your doctor or CPAP technician will help you decide which type is best for you:  · Basic CPAP keeps the pressure constant all night long.  · A bilevel device (BiPAP) provides more pressure when you breathe in and less when you breathe out. A BiPAP machine also may be set to provide automatic breaths to maintain breathing if you stop breathing while sleeping.  · An autoCPAP device automatically adjusts pressure throughout the night and in response to changes such as body position, sleep stage, and snoring.  Date Last Reviewed: 8/10/2015  © 3696-9771 [employer]. [address] [place] [address], [place] [address]. All rights reserved. This information is not intended as a substitute for professional medical care. Always follow your healthcare professional's instructions.

## 2018-06-13 NOTE — PROGRESS NOTES
Subjective:      Patient ID: Jake Ortiz is a 77 y.o. male.    Chief Complaint: No chief complaint on file.    The patient is a 77-year-old  male who presents to the Hematology Oncology Clinic today in consultation to discuss further evaluation management recommendations for iron deficiency anemia.  The patient has been referred to me by Dr. Chanda Steven with Internal Medicine.  I have reviewed all the patient's relevant clinical history available medical record including in Care everywhere and have utilized this in my evaluation management recommendations today.  This includes the details of his most recent hospitalizations at [facility] for evaluation and management of acute GI bleeding.  The patient continues on aspirin and Plavix.  He is off his Eliquis at this time.  The patient has undergone EGD/colonoscopy which did not show any etiology for his GI bleeding.  He is scheduled for small bowel video capsule endoscopy this week.  He denies any chest pain or shortness of breath today.  He denies any fevers, chills or night sweats.  He denies any loss of appetite or unintentional weight loss. He reports fatigue.  He denies any melena, hematochezia, hematemesis, hemoptysis or hematuria.  He did report having had melena for a few days before his initial hospitalization at [facility].  He was transfused with multiple units of PRBCs for supportive care.  He denies any bowel or urinary complaints.      Review of Systems   Constitutional: Negative for activity change, appetite change, chills, diaphoresis, fatigue, fever and unexpected weight change.   HENT: Negative for congestion, dental problem, ear pain, mouth sores, nosebleeds, postnasal drip, sinus pressure, sore throat, tinnitus, trouble swallowing and voice change.    Eyes: Negative for photophobia, pain, discharge, redness, itching and visual disturbance.   Respiratory: Negative for cough, chest tightness, shortness of  breath, wheezing and stridor.    Cardiovascular: Negative for chest pain, palpitations and leg swelling.   Gastrointestinal: Negative for abdominal distention, abdominal pain, anal bleeding, blood in stool, constipation, diarrhea, nausea and vomiting.   Endocrine: Negative for cold intolerance, heat intolerance, polydipsia, polyphagia and polyuria.   Genitourinary: Negative for decreased urine volume, difficulty urinating, dysuria, flank pain, frequency, hematuria and urgency.   Musculoskeletal: Positive for back pain. Negative for arthralgias, gait problem, joint swelling and myalgias.   Skin: Negative for pallor and rash.   Allergic/Immunologic: Negative for immunocompromised state.   Neurological: Negative for dizziness, syncope, weakness, light-headedness and headaches.   Hematological: Negative for adenopathy. Does not bruise/bleed easily.   Psychiatric/Behavioral: Negative for agitation and confusion. The patient is not nervous/anxious.        Medication List with Changes/Refills   Current Medications    ASPIRIN (ECOTRIN) 81 MG EC TABLET    Take 81 mg by mouth.    ATORVASTATIN (LIPITOR) 40 MG TABLET    Take 40 mg by mouth once daily.    BLOOD SUGAR DIAGNOSTIC STRP    Check blood glucose levels daily in the AM fasting and 1-2 times more daily.    BLOOD-GLUCOSE METER KIT    Use as instructed    CHOLECALCIFEROL, VITAMIN D3, (VITAMIN D3) 1,000 UNIT CAPSULE    Take 5,000 Units by mouth once daily.    LANCETS MISC    Check blood glucose levels daily in the AM fasting and 1-2 times more daily.    LISINOPRIL (PRINIVIL,ZESTRIL) 5 MG TABLET    TK 1 T PO D    MULTIVITAMIN CAPSULE    Take 1 capsule by mouth once daily.    NITROGLYCERIN (NITROSTAT) 0.3 MG SL TABLET    PLACE 1 TABLET UNDER THE TONGUE EVERY 5 MINUTES AS NEEDED FOR CHEST PAIN AS DIRECTED    PANTOPRAZOLE (PROTONIX) 40 MG TABLET       Discontinued Medications    ALBUTEROL 90 MCG/ACTUATION INHALER    Inhale 2 puffs into the lungs every 6 (six) hours as needed  for Wheezing. Dispense with spacer.    LISINOPRIL-HYDROCHLOROTHIAZIDE (PRINZIDE,ZESTORETIC) 20-25 MG TAB    TK 1 T PO D    UMECLIDINIUM-VILANTEROL (ANORO ELLIPTA) 62.5-25 MCG/ACTUATION DSDV    Inhale 1 puff into the lungs once daily.     Review of patient's allergies indicates:   Allergen Reactions    Amiodarone      Other reaction(s): bp becomes too low       Lab: I have reviewed all of the patient's relevant lab work available in the medical record and have utilized this in my evaluation and management recommendations today    Imaging: I have reviewed all of the patient's diagnostic/imaging results available in the medical record and have utilized this in my evaluation and management recommendations today.      Objective:     Vitals:    06/13/18 0903   BP: 119/69   Pulse: 89   Temp: 97.6 °F (36.4 °C)       Physical Exam   Constitutional: He is oriented to person, place, and time. He appears well-developed and well-nourished. No distress.   HENT:   Head: Normocephalic and atraumatic.   Nose: Nose normal.   Mouth/Throat: Oropharynx is clear and moist. No oropharyngeal exudate.   Eyes: EOM are normal. Pupils are equal, round, and reactive to light. No scleral icterus.   Neck: Normal range of motion. Neck supple. No tracheal deviation present. No thyromegaly present.   Cardiovascular: Normal rate, regular rhythm, normal heart sounds and intact distal pulses.    No murmur heard.  Pulmonary/Chest: Effort normal and breath sounds normal. No stridor. No respiratory distress. He has no wheezes. He has no rales. He exhibits no tenderness.   Abdominal: Soft. Bowel sounds are normal. He exhibits no distension and no mass. There is no tenderness. There is no rebound and no guarding.   Musculoskeletal: Normal range of motion. He exhibits no edema or tenderness.   Lymphadenopathy:     He has no cervical adenopathy.   Neurological: He is alert and oriented to person, place, and time. Coordination normal.   Skin: Skin is warm. No  rash noted. He is not diaphoretic. No erythema.   Psychiatric: He has a normal mood and affect. His behavior is normal. Judgment and thought content normal.   Nursing note and vitals reviewed.      Assessment:     1. Iron deficiency anemia due to chronic blood loss    2. Gastrointestinal hemorrhage with melena        Plan:   1.  I had a detailed discussion with the patient today with regard to his current clinical situation.  At this time after detailed discussion of risks/benefits of recommended proceeding with 2 weekly doses of IV injectafer for treatment of his severe iron deficiency anemia.  Common side effects of were reviewed in detail and he expressed understanding and agreement to proceed with this.  This will be scheduled.  2.  He knows to seek immediate medical attention/proceed to the emergency room for any signs/symptoms of GI/ bleeding.  3.  I have recommended that he continue to stay off Eliquis at this time as risks of bleeding exceed risks of clotting at this time.  Further management recommendations with regard to this medication will be determined based on results of small bowel video capsule endoscopy and Gastroenterology/cardiology recommendations after this is completed.    Follow-up in 1 month with CBC.  He knows to call sooner if any new problems or questions.    Iron deficiency anemia due to chronic blood loss  -     CBC auto differential; Future; Expected date: 06/13/2018    Gastrointestinal hemorrhage with melena  -     CBC auto differential; Future; Expected date: 06/13/2018    Other orders  -     sodium chloride 0.9% 100 mL flush bag; Inject into the vein one time.  -     sodium chloride 0.9% flush 10 mL; Inject 10 mLs into the vein as needed for Line Care (Flush lines as needed.).  -     heparin, porcine (PF) 100 unit/mL injection flush 500 Units; Inject 5 mLs (500 Units total) into the vein as needed (Flush lines as needed).  -     alteplase injection 2 mg; 2 mg by Intra-Catheter  route as needed (To restore central venous catheter function).  -     ferric carboxymaltose (INJECTAFER) 750 mg in sodium chloride 0.9% 250 mL infusion; Inject 750 mg into the vein once.  -     sodium chloride 0.9% 100 mL flush bag; Inject into the vein one time.  -     sodium chloride 0.9% flush 10 mL; Inject 10 mLs into the vein as needed for Line Care (Flush lines as needed.).  -     heparin, porcine (PF) 100 unit/mL injection flush 500 Units; Inject 5 mLs (500 Units total) into the vein as needed (Flush lines as needed).  -     alteplase injection 2 mg; 2 mg by Intra-Catheter route as needed (To restore central venous catheter function).  -     ferric carboxymaltose (INJECTAFER) 750 mg in sodium chloride 0.9% 250 mL infusion; Inject 750 mg into the vein once.

## 2018-06-18 ENCOUNTER — OFFICE VISIT (OUTPATIENT)
Dept: FAMILY MEDICINE | Facility: CLINIC | Age: 78
End: 2018-06-18
Payer: MEDICARE

## 2018-06-18 VITALS
TEMPERATURE: 98 F | HEIGHT: 69 IN | DIASTOLIC BLOOD PRESSURE: 60 MMHG | SYSTOLIC BLOOD PRESSURE: 118 MMHG | WEIGHT: 200.38 LBS | BODY MASS INDEX: 29.68 KG/M2 | RESPIRATION RATE: 16 BRPM | OXYGEN SATURATION: 98 % | HEART RATE: 80 BPM

## 2018-06-18 DIAGNOSIS — I50.9 CONGESTIVE HEART FAILURE, UNSPECIFIED HF CHRONICITY, UNSPECIFIED HEART FAILURE TYPE: ICD-10-CM

## 2018-06-18 DIAGNOSIS — E11.69 DIABETES MELLITUS TYPE 2 IN OBESE: ICD-10-CM

## 2018-06-18 DIAGNOSIS — C61 ADENOCARCINOMA OF PROSTATE: ICD-10-CM

## 2018-06-18 DIAGNOSIS — J98.4 MIXED RESTRICTIVE AND OBSTRUCTIVE LUNG DISEASE: ICD-10-CM

## 2018-06-18 DIAGNOSIS — J43.9 MIXED RESTRICTIVE AND OBSTRUCTIVE LUNG DISEASE: ICD-10-CM

## 2018-06-18 DIAGNOSIS — K92.1 GASTROINTESTINAL HEMORRHAGE WITH MELENA: ICD-10-CM

## 2018-06-18 DIAGNOSIS — I48.91 ATRIAL FIBRILLATION, UNSPECIFIED TYPE: ICD-10-CM

## 2018-06-18 DIAGNOSIS — D71 GRANULOMATOUS DISEASE: ICD-10-CM

## 2018-06-18 DIAGNOSIS — I21.4 NSTEMI (NON-ST ELEVATED MYOCARDIAL INFARCTION): ICD-10-CM

## 2018-06-18 DIAGNOSIS — Z95.0 PACEMAKER: ICD-10-CM

## 2018-06-18 DIAGNOSIS — E66.9 DIABETES MELLITUS TYPE 2 IN OBESE: ICD-10-CM

## 2018-06-18 DIAGNOSIS — D50.0 IRON DEFICIENCY ANEMIA DUE TO CHRONIC BLOOD LOSS: Primary | ICD-10-CM

## 2018-06-18 DIAGNOSIS — I70.0 ARTERIOSCLEROSIS OF AORTA: ICD-10-CM

## 2018-06-18 PROCEDURE — 99999 PR PBB SHADOW E&M-EST. PATIENT-LVL IV: CPT | Mod: PBBFAC,,, | Performed by: FAMILY MEDICINE

## 2018-06-18 PROCEDURE — 99499 UNLISTED E&M SERVICE: CPT | Mod: S$PBB,,, | Performed by: FAMILY MEDICINE

## 2018-06-18 PROCEDURE — 3078F DIAST BP <80 MM HG: CPT | Mod: CPTII,S$GLB,, | Performed by: FAMILY MEDICINE

## 2018-06-18 PROCEDURE — 3074F SYST BP LT 130 MM HG: CPT | Mod: CPTII,S$GLB,, | Performed by: FAMILY MEDICINE

## 2018-06-18 PROCEDURE — 99214 OFFICE O/P EST MOD 30 MIN: CPT | Mod: S$GLB,,, | Performed by: FAMILY MEDICINE

## 2018-06-18 PROCEDURE — 3044F HG A1C LEVEL LT 7.0%: CPT | Mod: CPTII,S$GLB,, | Performed by: FAMILY MEDICINE

## 2018-06-18 RX ORDER — CLOPIDOGREL BISULFATE 75 MG/1
75 TABLET ORAL DAILY
COMMUNITY
End: 2020-09-24

## 2018-06-18 NOTE — PROGRESS NOTES
"Subjective:       Patient ID: Jake Ortiz is a 78 y.o. male.    Chief Complaint: Follow-up    HPI   Follow-up  78yo male presents today for follow-up. He is accompanied by his wife for assessment. Since last visit he has been evaluated by Hematology. Plan is to repeat labs next month but he has been scheduled for two IV iron infusions in the interim. Last CBC was measured at 10/2/30.9 (6/10/18). He was admitted to [facility] for chest pain on 6/9/18 and has a visit with Cardiology scheduled tomorrow- no discharge paperwork has been brought for review. He denies any active CP. To date he has not been contacted by GI (Dr. Shah) regarding VCE which was recommended upon discharge from his previous hospitalization. He has noted melena or rectal bleeding. There are no symptoms of abdominal pain. He has been closely monitoring blood glucose levels and is questioning whether he may not actually be a diabetic any more based on his home glucose log. There are no symptoms of hyper or hypoglycemia reported. AM fasting glucose 105-185 and PM glucose from . Last A1c in May 2018 was 6.8.     Review of Systems   Constitutional: Negative for activity change and unexpected weight change.   HENT: Negative for hearing loss, rhinorrhea and trouble swallowing.    Eyes: Negative for discharge and visual disturbance.   Respiratory: Negative for chest tightness and wheezing.    Cardiovascular: Negative for chest pain and palpitations.   Gastrointestinal: Negative for blood in stool, constipation, diarrhea and vomiting.   Endocrine: Negative for polydipsia and polyuria.   Genitourinary: Negative for difficulty urinating, hematuria and urgency.   Musculoskeletal: Negative for arthralgias, joint swelling and neck pain.   Neurological: Negative for weakness and headaches.   Psychiatric/Behavioral: Negative for confusion and dysphoric mood.       Objective:   /60   Pulse 80   Temp 97.6 °F (36.4 °C) (Temporal)   Resp 16   Ht 5' 9" " (1.753 m)   Wt 90.9 kg (200 lb 6.4 oz)   SpO2 98%   BMI 29.59 kg/m²   Physical Exam   Constitutional: He is oriented to person, place, and time. He appears well-developed and well-nourished. No distress.   HENT:   Head: Normocephalic and atraumatic.   Right Ear: Tympanic membrane, external ear and ear canal normal.   Left Ear: Tympanic membrane, external ear and ear canal normal.   Nose: Nose normal.   Mouth/Throat: Oropharynx is clear and moist.   Eyes: Conjunctivae and EOM are normal. Pupils are equal, round, and reactive to light.   Neck: Normal range of motion. Neck supple.   Cardiovascular: Normal rate, regular rhythm and normal heart sounds.    Pulmonary/Chest: Effort normal and breath sounds normal.   Abdominal: Soft. Bowel sounds are normal.   Musculoskeletal: He exhibits no edema.   Neurological: He is alert and oriented to person, place, and time.   Skin: Skin is warm and dry. No rash noted. He is not diaphoretic.   Psychiatric: He has a normal mood and affect. His behavior is normal.       Assessment:       1. Iron deficiency anemia due to chronic blood loss    2. Gastrointestinal hemorrhage with melena    3. Diabetes mellitus type 2 in obese    4. Atrial fibrillation, unspecified type    5. Congestive heart failure, unspecified HF chronicity, unspecified heart failure type    6. Granulomatous disease    7. Mixed restrictive and obstructive lung disease    8. Arteriosclerosis of aorta    9. Adenocarcinoma of prostate    10. Pacemaker        Plan:      Iron deficiency anemia due to chronic blood loss  Reviewed notes with the patient from Heme/Onc. He has not been scheduled for VCE and is interested in pursuing further GI evaluation within [facility]. Will arrange. Have advised keeping the pending appointments for iron infusion as scheduled. See Dr. Espana for follow-up as planned.     Gastrointestinal hemorrhage with melena  -     Ambulatory Referral to Gastroenterology    Diabetes mellitus type 2 in  obese  Reviewed home glucose log with the patient and his spouse. Readings have been stable with occasional elevation noted- this is attributed to diet. He has been assessed by diabetic education and has implemented dietary changes. These efforts remain encouraged. Have also advised that based on anemia his A1c is likely higher than 6.8 which merits continued monitoring. Patient acknowledges understanding. He will return to diabetic education next month and keep pending follow-up with me in August with labs prior to the visit. Target A1c goal remains <7.0. Given co-morbidities <8.0 is acceptable.  -     Hemoglobin A1c; Future; Expected date: 06/18/2018  -     Comprehensive metabolic panel; Future; Expected date: 06/18/2018    Atrial fibrillation, unspecified type  Rate controlled. Continue with current treatment and follow-up recommendations as per Cardiology.    Congestive heart failure, unspecified HF chronicity, unspecified heart failure type  Continue with current treatment and follow-up recommendations as per Cardiology.    NSTEMI (non-ST elevated myocardial infarction)  Records obtained through Care Everywhere demonstrate NSTEMI was reason for admission to [facility]. His medication regimen has been adjusted. Currently CP free. He has been advised to see Cardiology tomorrow as scheduled.    Granulomatous disease  Continue as per Pulmonology.    Mixed restrictive and obstructive lung disease  Continue as per Pulmonology.    Arteriosclerosis of aorta  BP and lipid control remain advised.    Adenocarcinoma of prostate  Continue as per Heme/Onc.     Pacemaker  As above.    RTC in August as planned.

## 2018-06-22 PROBLEM — I21.4 NSTEMI (NON-ST ELEVATED MYOCARDIAL INFARCTION): Status: ACTIVE | Noted: 2018-06-22

## 2018-06-26 ENCOUNTER — INFUSION (OUTPATIENT)
Dept: INFUSION THERAPY | Facility: HOSPITAL | Age: 78
End: 2018-06-26
Attending: INTERNAL MEDICINE
Payer: MEDICARE

## 2018-06-26 VITALS
DIASTOLIC BLOOD PRESSURE: 75 MMHG | OXYGEN SATURATION: 98 % | SYSTOLIC BLOOD PRESSURE: 125 MMHG | RESPIRATION RATE: 18 BRPM | TEMPERATURE: 98 F | HEART RATE: 59 BPM

## 2018-06-26 DIAGNOSIS — D50.0 IRON DEFICIENCY ANEMIA DUE TO CHRONIC BLOOD LOSS: Primary | ICD-10-CM

## 2018-06-26 PROCEDURE — 25000003 PHARM REV CODE 250: Performed by: INTERNAL MEDICINE

## 2018-06-26 PROCEDURE — 63600175 PHARM REV CODE 636 W HCPCS: Mod: JG | Performed by: INTERNAL MEDICINE

## 2018-06-26 PROCEDURE — 96365 THER/PROPH/DIAG IV INF INIT: CPT

## 2018-06-26 RX ADMIN — FERRIC CARBOXYMALTOSE INJECTION 750 MG: 50 INJECTION, SOLUTION INTRAVENOUS at 01:06

## 2018-06-26 NOTE — PLAN OF CARE
Problem: Patient Care Overview  Goal: Plan of Care Review  Outcome: Ongoing (interventions implemented as appropriate)  I feel pretty good just always a little tired
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
## 2018-06-27 ENCOUNTER — INITIAL CONSULT (OUTPATIENT)
Dept: GASTROENTEROLOGY | Facility: CLINIC | Age: 78
End: 2018-06-27
Payer: MEDICARE

## 2018-06-27 VITALS
BODY MASS INDEX: 30.57 KG/M2 | HEIGHT: 69 IN | SYSTOLIC BLOOD PRESSURE: 118 MMHG | DIASTOLIC BLOOD PRESSURE: 60 MMHG | HEART RATE: 70 BPM | WEIGHT: 206.38 LBS

## 2018-06-27 DIAGNOSIS — Z87.19 HISTORY OF MELENA: ICD-10-CM

## 2018-06-27 DIAGNOSIS — D50.0 IRON DEFICIENCY ANEMIA DUE TO CHRONIC BLOOD LOSS: Primary | ICD-10-CM

## 2018-06-27 PROCEDURE — 99999 PR PBB SHADOW E&M-EST. PATIENT-LVL III: CPT | Mod: PBBFAC,,, | Performed by: NURSE PRACTITIONER

## 2018-06-27 PROCEDURE — 3074F SYST BP LT 130 MM HG: CPT | Mod: CPTII,S$GLB,, | Performed by: NURSE PRACTITIONER

## 2018-06-27 PROCEDURE — 3078F DIAST BP <80 MM HG: CPT | Mod: CPTII,S$GLB,, | Performed by: NURSE PRACTITIONER

## 2018-06-27 PROCEDURE — 99204 OFFICE O/P NEW MOD 45 MIN: CPT | Mod: S$GLB,,, | Performed by: NURSE PRACTITIONER

## 2018-06-27 RX ORDER — TEMAZEPAM 15 MG/1
15 CAPSULE ORAL NIGHTLY PRN
Refills: 5 | COMMUNITY
Start: 2018-06-19 | End: 2018-11-21

## 2018-06-27 NOTE — PROGRESS NOTES
Clinic Consult:  [facility] Consultation Note    Reason for Consult:  The primary encounter diagnosis was Iron deficiency anemia due to chronic blood loss. A diagnosis of History of melena was also pertinent to this visit.    PCP: Chanda Brush   [address] [address] / [facility] [id number]    HPI:  This is a 78 y.o. male here for evaluation of the above. He was referred to me by Dr. Brush. He has iron deficiency anemia. Had melena about 2-3 months ago. Reviewed records from care everywhere. He was hospitalized in on 6/9/28 for acute GI bleeding. He had an EGD and colonoscopy while inpatient that were unrevealing. Recommended outpatient follow up with video capsule endoscopy. He is here to discuss. Currently, he denies any evidence of GI bleeding.      Review of Systems   Constitutional: Negative for fever, malaise/fatigue and weight loss.   HENT: Negative for sore throat.    Respiratory: Negative for cough and wheezing.    Cardiovascular: Negative for chest pain and palpitations.   Gastrointestinal: Negative for abdominal pain, blood in stool, constipation, diarrhea, heartburn, melena, nausea and vomiting.   Genitourinary: Negative for dysuria and frequency.   Musculoskeletal: Negative for back pain, joint pain, myalgias and neck pain.   Skin: Negative for itching and rash.   Neurological: Negative for dizziness, speech change, seizures, loss of consciousness and headaches.   Psychiatric/Behavioral: Negative for depression and substance abuse. The patient is not nervous/anxious.        Medical History:  has a past medical history of Abnormal PFT; Arthritis; Atrial fibrillation (10/19/2017); Back pain; Congestive heart failure (3/5/2018); Coronary artery disease; Diabetes mellitus (01/2018); Hyperlipemia; Hypertension; Myocardial infarction; Obesity; NASREEN (obstructive sleep apnea) (6/5/2018); Prostate cancer (2015); and Tobacco dependence.    Surgical History:  has a past surgical  "history that includes Spine surgery; Tonsillectomy; and Coronary artery bypass graft (1987).    Family History: family history includes Cataracts in his mother; Diabetes in his mother; Heart attack in his mother; Heart disease in his brother, father, and mother; Stroke in his father..     Social History:  reports that he quit smoking about 30 years ago. His smoking use included Cigarettes. He started smoking about 50 years ago. He has a 30.00 pack-year smoking history. He has never used smokeless tobacco. He reports that he does not drink alcohol or use drugs.    Allergies: Reviewed    Home Medications:   Current Outpatient Prescriptions on File Prior to Visit   Medication Sig Dispense Refill    aspirin (ECOTRIN) 81 MG EC tablet Take 81 mg by mouth.      atorvastatin (LIPITOR) 40 MG tablet Take 80 mg by mouth once daily.       blood sugar diagnostic Strp Check blood glucose levels daily in the AM fasting and 1-2 times more daily. 100 strip 11    blood-glucose meter kit Use as instructed 1 each 0    cholecalciferol, vitamin D3, (VITAMIN D3) 1,000 unit capsule Take 5,000 Units by mouth once daily.      clopidogrel (PLAVIX) 75 mg tablet Take 75 mg by mouth once daily.      lancets Misc Check blood glucose levels daily in the AM fasting and 1-2 times more daily. 100 each 11    lisinopril (PRINIVIL,ZESTRIL) 5 MG tablet TK 1 T PO D  0    multivitamin capsule Take 1 capsule by mouth once daily.      nitroGLYCERIN (NITROSTAT) 0.3 MG SL tablet PLACE 1 TABLET UNDER THE TONGUE EVERY 5 MINUTES AS NEEDED FOR CHEST PAIN AS DIRECTED  0     No current facility-administered medications on file prior to visit.        Physical Exam:  /60   Pulse 70   Ht 5' 9" (1.753 m)   Wt 93.6 kg (206 lb 5.6 oz)   BMI 30.47 kg/m²   Body mass index is 30.47 kg/m².  Physical Exam   Constitutional: He is oriented to person, place, and time and well-developed, well-nourished, and in no distress. No distress.   HENT:   Head: " "Normocephalic.   Eyes: Conjunctivae are normal. Pupils are equal, round, and reactive to light.   Cardiovascular: Normal rate, regular rhythm and normal heart sounds.    Pulmonary/Chest: Effort normal and breath sounds normal. No respiratory distress.   Abdominal: Soft. Bowel sounds are normal. He exhibits no distension. There is no tenderness.   Neurological: He is alert and oriented to person, place, and time. No cranial nerve deficit.   Skin: Skin is warm and dry. No rash noted.   Psychiatric: Mood and affect normal.       Labs: Pertinent labs reviewed.  Endoscopy:  See HPI  CRC Screening: See HPI    Assessment:  1. Iron deficiency anemia due to chronic blood loss    2. History of melena         Recommendations:  - recommend video capsule endoscopy for visualization of the small bowel  - EGD and colonoscopy done on 6/9/18 and unrevealing for source of bleeding.   - recent melena. No overt bleeding at this time.  - on plavix and ASA  - he does have a pacemaker. Will consult with physician provider that ok to proceed. Per up to date "Patients who have defibrillators or pacemakers (this is a recommendation in the package insert, but does not appear to be a significant clinical problem [21-24]). In a 2017 guideline from the [employer], the presence of a pacemaker was no longer considered a contraindication for capsule endoscopy [25]. Left ventricular assist devices may interfere with image acquisition by the recorder."  -     Capsule Video Endoscopy; Future    Follow up to be determined after procedure.    Thank you so much for allowing me to participate in the care of DANIEL Metz  "

## 2018-06-27 NOTE — LETTER
July 1, 2018      Chanda Brush MD  [address] [address] [address]           [facility]  [address] [place] [id number]  Phone: [phone]  Fax: [phone]          Patient: Jake Ortiz   MR Number: [id number]   YOB: 1940   Date of Visit: 6/27/2018       Dear Dr. Chanda Brush:    Thank you for referring Jake Ortiz to me for evaluation. Attached you will find relevant portions of my assessment and plan of care.    If you have questions, please do not hesitate to call me. I look forward to following Jake Ortiz along with you.    Sincerely,    Mayi Ritchie, NP    Enclosure  CC:  No Recipients    If you would like to receive this communication electronically, please contact [email or web address] or [phone] to request more information on [employer] Link access.    For providers and/or their staff who would like to refer a patient to [facility], please contact us through our one-stop-shop provider referral line, [facility], at [phone].    If you feel you have received this communication in error or would no longer like to receive these types of communications, please e-mail [email or web address]

## 2018-07-02 ENCOUNTER — INFUSION (OUTPATIENT)
Dept: INFUSION THERAPY | Facility: HOSPITAL | Age: 78
End: 2018-07-02
Attending: INTERNAL MEDICINE
Payer: MEDICARE

## 2018-07-02 VITALS
DIASTOLIC BLOOD PRESSURE: 76 MMHG | RESPIRATION RATE: 18 BRPM | TEMPERATURE: 97 F | SYSTOLIC BLOOD PRESSURE: 122 MMHG | OXYGEN SATURATION: 98 % | HEART RATE: 65 BPM

## 2018-07-02 DIAGNOSIS — D50.0 IRON DEFICIENCY ANEMIA DUE TO CHRONIC BLOOD LOSS: Primary | ICD-10-CM

## 2018-07-02 PROCEDURE — 96365 THER/PROPH/DIAG IV INF INIT: CPT

## 2018-07-02 PROCEDURE — 25000003 PHARM REV CODE 250: Performed by: INTERNAL MEDICINE

## 2018-07-02 PROCEDURE — 63600175 PHARM REV CODE 636 W HCPCS: Mod: JG | Performed by: INTERNAL MEDICINE

## 2018-07-02 RX ADMIN — FERRIC CARBOXYMALTOSE INJECTION 750 MG: 50 INJECTION, SOLUTION INTRAVENOUS at 01:07

## 2018-07-02 NOTE — PLAN OF CARE
Problem: Patient Care Overview  Goal: Plan of Care Review  Outcome: Ongoing (interventions implemented as appropriate)  Oh I feel as good as I can

## 2018-07-02 NOTE — PATIENT INSTRUCTIONS
[facility]  [address]  [phone] phone     [phone] fax  Hours of Operation: Monday- Friday 8:00am- 5:00pm  After hours phone  [phone]  Hematology / Oncology Physicians on call      Dr. Portillo Ya                        Please call with any concerns regarding your appointment today.

## 2018-07-05 ENCOUNTER — TELEPHONE (OUTPATIENT)
Dept: GASTROENTEROLOGY | Facility: CLINIC | Age: 78
End: 2018-07-05

## 2018-07-05 ENCOUNTER — HOSPITAL ENCOUNTER (OUTPATIENT)
Dept: SLEEP MEDICINE | Facility: HOSPITAL | Age: 78
Discharge: HOME | End: 2018-07-05
Attending: INTERNAL MEDICINE
Payer: MEDICARE

## 2018-07-05 DIAGNOSIS — G47.33 OBSTRUCTIVE SLEEP APNEA: Primary | ICD-10-CM

## 2018-07-05 DIAGNOSIS — G47.61 PERIODIC LIMB MOVEMENT DISORDER (PLMD): ICD-10-CM

## 2018-07-05 PROCEDURE — 95811 POLYSOM 6/>YRS CPAP 4/> PARM: CPT

## 2018-07-05 PROCEDURE — 95811 POLYSOM 6/>YRS CPAP 4/> PARM: CPT | Mod: 26,,, | Performed by: PSYCHOLOGIST

## 2018-07-09 ENCOUNTER — PATIENT MESSAGE (OUTPATIENT)
Dept: FAMILY MEDICINE | Facility: CLINIC | Age: 78
End: 2018-07-09

## 2018-07-10 NOTE — TELEPHONE ENCOUNTER
I discussed with Dr. Ulloa. He will not be able to proceed with video capsule secondary to implanted pacemaker. Please cancel order and notify patient. Recommendations at this point would be to monitor stools and CBC. If he develops melena again or drop in Hgb, he will need referral for small bowel enteroscopy.

## 2018-07-11 ENCOUNTER — TELEPHONE (OUTPATIENT)
Dept: PULMONOLOGY | Facility: CLINIC | Age: 78
End: 2018-07-11

## 2018-07-11 ENCOUNTER — HOSPITAL ENCOUNTER (OUTPATIENT)
Dept: RADIOLOGY | Facility: HOSPITAL | Age: 78
Discharge: HOME | End: 2018-07-11
Attending: ORTHOPAEDIC SURGERY
Payer: MEDICARE

## 2018-07-11 DIAGNOSIS — M51.16 DISPLACEMENT OF LUMBAR DISC WITH RADICULOPATHY: ICD-10-CM

## 2018-07-11 DIAGNOSIS — G47.33 OBSTRUCTIVE SLEEP APNEA: Primary | ICD-10-CM

## 2018-07-11 PROCEDURE — 72131 CT LUMBAR SPINE W/O DYE: CPT | Mod: TC

## 2018-07-11 NOTE — TELEPHONE ENCOUNTER
CPAP titration sleep study Results reviewed:     The CPAP titration polysomnography revealed that 14 cm H2O pressure (C - Flex 3, humidity 2), the only pressure tested in enough sleep (30 min sleep or more) and REM sleep (0.1 hr or more), was inadequate, as it reduced the rate of respiratory events only 36 % to A + H Index = 14.3 events / hr asleep in 1.1 hrs sleep (0.1 hrs REM sleep). Note though that 16 cm also was tested for > 0.5 hrs sleep, and had an better A + H Index = 9.8 events / hr asleep, but with no REM sleep. Snoring was eliminated at all pressures tested.      Plan:   1. Start AUTOPAP 4 - 20 CMWP  2. CPAP Habituation:  3. Repeat a CPAP / BIPAP titration after successful PAP habituation.     Schedule  4 weeks follow up for complaince evaluation.     Ordered Please inform pateint

## 2018-07-11 NOTE — TELEPHONE ENCOUNTER
Patient notified of sleep study results and need for CPAP titration study. Patient verbalized understanding

## 2018-07-15 ENCOUNTER — PATIENT MESSAGE (OUTPATIENT)
Dept: FAMILY MEDICINE | Facility: CLINIC | Age: 78
End: 2018-07-15

## 2018-07-16 ENCOUNTER — PATIENT MESSAGE (OUTPATIENT)
Dept: GASTROENTEROLOGY | Facility: CLINIC | Age: 78
End: 2018-07-16

## 2018-07-17 ENCOUNTER — OFFICE VISIT (OUTPATIENT)
Dept: HEMATOLOGY/ONCOLOGY | Facility: CLINIC | Age: 78
End: 2018-07-17
Payer: MEDICARE

## 2018-07-17 ENCOUNTER — LAB VISIT (OUTPATIENT)
Dept: LAB | Facility: HOSPITAL | Age: 78
End: 2018-07-17
Attending: INTERNAL MEDICINE
Payer: MEDICARE

## 2018-07-17 ENCOUNTER — PATIENT MESSAGE (OUTPATIENT)
Dept: GASTROENTEROLOGY | Facility: CLINIC | Age: 78
End: 2018-07-17

## 2018-07-17 VITALS
HEIGHT: 69 IN | TEMPERATURE: 97 F | OXYGEN SATURATION: 98 % | HEART RATE: 73 BPM | DIASTOLIC BLOOD PRESSURE: 77 MMHG | BODY MASS INDEX: 31.6 KG/M2 | WEIGHT: 213.38 LBS | SYSTOLIC BLOOD PRESSURE: 127 MMHG

## 2018-07-17 DIAGNOSIS — K92.1 GASTROINTESTINAL HEMORRHAGE WITH MELENA: ICD-10-CM

## 2018-07-17 DIAGNOSIS — D50.0 IRON DEFICIENCY ANEMIA DUE TO CHRONIC BLOOD LOSS: ICD-10-CM

## 2018-07-17 DIAGNOSIS — R18.8 OTHER ASCITES: Primary | ICD-10-CM

## 2018-07-17 DIAGNOSIS — D50.0 IRON DEFICIENCY ANEMIA DUE TO CHRONIC BLOOD LOSS: Primary | ICD-10-CM

## 2018-07-17 LAB
BASOPHILS # BLD AUTO: 0.01 K/UL
BASOPHILS NFR BLD: 0.2 %
DACRYOCYTES BLD QL SMEAR: ABNORMAL
DIFFERENTIAL METHOD: ABNORMAL
EOSINOPHIL # BLD AUTO: 0.1 K/UL
EOSINOPHIL NFR BLD: 2.1 %
ERYTHROCYTE [DISTWIDTH] IN BLOOD BY AUTOMATED COUNT: 25.4 %
HCT VFR BLD AUTO: 37.3 %
HGB BLD-MCNC: 11.3 G/DL
HYPOCHROMIA BLD QL SMEAR: ABNORMAL
LYMPHOCYTES # BLD AUTO: 0.8 K/UL
LYMPHOCYTES NFR BLD: 17.7 %
MCH RBC QN AUTO: 26.6 PG
MCHC RBC AUTO-ENTMCNC: 30.3 G/DL
MCV RBC AUTO: 88 FL
MONOCYTES # BLD AUTO: 0.4 K/UL
MONOCYTES NFR BLD: 9.3 %
NEUTROPHILS # BLD AUTO: 3 K/UL
NEUTROPHILS NFR BLD: 70.9 %
OVALOCYTES BLD QL SMEAR: ABNORMAL
PLATELET # BLD AUTO: 231 K/UL
PMV BLD AUTO: 9.5 FL
POIKILOCYTOSIS BLD QL SMEAR: SLIGHT
RBC # BLD AUTO: 4.25 M/UL
TARGETS BLD QL SMEAR: ABNORMAL
WBC # BLD AUTO: 4.3 K/UL

## 2018-07-17 PROCEDURE — 3078F DIAST BP <80 MM HG: CPT | Mod: CPTII,S$GLB,, | Performed by: INTERNAL MEDICINE

## 2018-07-17 PROCEDURE — 99214 OFFICE O/P EST MOD 30 MIN: CPT | Mod: S$GLB,,, | Performed by: INTERNAL MEDICINE

## 2018-07-17 PROCEDURE — 85025 COMPLETE CBC W/AUTO DIFF WBC: CPT

## 2018-07-17 PROCEDURE — 99999 PR PBB SHADOW E&M-EST. PATIENT-LVL III: CPT | Mod: PBBFAC,,, | Performed by: INTERNAL MEDICINE

## 2018-07-17 PROCEDURE — 3074F SYST BP LT 130 MM HG: CPT | Mod: CPTII,S$GLB,, | Performed by: INTERNAL MEDICINE

## 2018-07-17 PROCEDURE — 36415 COLL VENOUS BLD VENIPUNCTURE: CPT

## 2018-07-17 NOTE — PROGRESS NOTES
Please notify patient that I was able to located the finding of ascites. I would like to schedule an ultrasound of the abdomen and then have him follow up with me a couple of days after. Please schedule ultrasound and follow up visit.

## 2018-07-17 NOTE — PROGRESS NOTES
Subjective:      Patient ID: Jake Ortiz is a 78 y.o. male.    Chief Complaint: No chief complaint on file.    The patient is a 77-year-old  male who presents to the Hematology Oncology Clinic today in consultation to discuss further evaluation management recommendations for iron deficiency anemia.  The patient has been referred to me by Dr. Chanda Steven with Internal Medicine.  I have reviewed all the patient's relevant clinical history available medical record including in Care everywhere and have utilized this in my evaluation management recommendations today.  This includes the details of his most recent hospitalizations at [facility] for evaluation and management of acute GI bleeding.  The patient continues on aspirin and Plavix.  He is off his Eliquis at this time.  The patient has undergone EGD/colonoscopy which did not show any etiology for his GI bleeding.  He has been unable to get small bowel video capsule endoscopy because of having a pacemaker.  He denies any chest pain or shortness of breath today.  He denies any fevers, chills or night sweats.  He denies any loss of appetite or unintentional weight loss. He reports fatigue.  He denies any melena, hematochezia, hematemesis, hemoptysis or hematuria.  He did report having had melena for a few days before his initial hospitalization at [facility].  He was transfused with multiple units of PRBCs for supportive care.  Melena has not recurred since that time.  He denies any bowel or urinary complaints.  He reports that he has tolerated his IV iron infusions well without any significant side effects.  He reports that he is back to doing all of his usual activities without any problems.      Review of Systems   Constitutional: Negative for activity change, appetite change, chills, diaphoresis, fatigue, fever and unexpected weight change.   HENT: Negative for congestion, dental problem, ear pain, mouth sores, nosebleeds,  postnasal drip, sinus pressure, sore throat, tinnitus, trouble swallowing and voice change.    Eyes: Negative for photophobia, pain, discharge, redness, itching and visual disturbance.   Respiratory: Negative for cough, chest tightness, shortness of breath, wheezing and stridor.    Cardiovascular: Negative for chest pain, palpitations and leg swelling.   Gastrointestinal: Negative for abdominal distention, abdominal pain, anal bleeding, blood in stool, constipation, diarrhea, nausea and vomiting.   Endocrine: Negative for cold intolerance, heat intolerance, polydipsia, polyphagia and polyuria.   Genitourinary: Negative for decreased urine volume, difficulty urinating, dysuria, flank pain, frequency, hematuria and urgency.   Musculoskeletal: Positive for back pain. Negative for arthralgias, gait problem, joint swelling and myalgias.   Skin: Negative for pallor and rash.   Allergic/Immunologic: Negative for immunocompromised state.   Neurological: Negative for dizziness, syncope, weakness, light-headedness and headaches.   Hematological: Negative for adenopathy. Does not bruise/bleed easily.   Psychiatric/Behavioral: Negative for agitation and confusion. The patient is not nervous/anxious.        Medication List with Changes/Refills   Current Medications    ASPIRIN (ECOTRIN) 81 MG EC TABLET    Take 81 mg by mouth.    ATORVASTATIN (LIPITOR) 40 MG TABLET    Take 80 mg by mouth once daily.     BLOOD SUGAR DIAGNOSTIC STRP    Check blood glucose levels daily in the AM fasting and 1-2 times more daily.    BLOOD-GLUCOSE METER KIT    Use as instructed    CHOLECALCIFEROL, VITAMIN D3, (VITAMIN D3) 1,000 UNIT CAPSULE    Take 5,000 Units by mouth once daily.    CLOPIDOGREL (PLAVIX) 75 MG TABLET    Take 75 mg by mouth once daily.    LANCETS MISC    Check blood glucose levels daily in the AM fasting and 1-2 times more daily.    LISINOPRIL (PRINIVIL,ZESTRIL) 5 MG TABLET    TK 1 T PO D    MULTIVITAMIN CAPSULE    Take 1 capsule by  mouth once daily.    NITROGLYCERIN (NITROSTAT) 0.3 MG SL TABLET    PLACE 1 TABLET UNDER THE TONGUE EVERY 5 MINUTES AS NEEDED FOR CHEST PAIN AS DIRECTED    TEMAZEPAM (RESTORIL) 15 MG CAP    Take 15 mg by mouth nightly as needed.     Review of patient's allergies indicates:   Allergen Reactions    Amiodarone      Other reaction(s): bp becomes too low       Lab: I have reviewed all of the patient's relevant lab work available in the medical record and have utilized this in my evaluation and management recommendations today    Imaging: I have reviewed all of the patient's diagnostic/imaging results available in the medical record and have utilized this in my evaluation and management recommendations today.      Objective:     Vitals:    07/17/18 1309   BP: 127/77   Pulse: 73   Temp: 97 °F (36.1 °C)       Physical Exam   Constitutional: He is oriented to person, place, and time. He appears well-developed and well-nourished. No distress.   HENT:   Head: Normocephalic and atraumatic.   Nose: Nose normal.   Mouth/Throat: Oropharynx is clear and moist. No oropharyngeal exudate.   Eyes: EOM are normal. Pupils are equal, round, and reactive to light. No scleral icterus.   Neck: Normal range of motion. Neck supple. No tracheal deviation present. No thyromegaly present.   Cardiovascular: Normal rate, regular rhythm, normal heart sounds and intact distal pulses.    No murmur heard.  Pulmonary/Chest: Effort normal and breath sounds normal. No stridor. No respiratory distress. He has no wheezes. He has no rales. He exhibits no tenderness.   Abdominal: Soft. Bowel sounds are normal. He exhibits no distension and no mass. There is no tenderness. There is no rebound and no guarding.   Musculoskeletal: Normal range of motion. He exhibits no edema or tenderness.   Lymphadenopathy:     He has no cervical adenopathy.   Neurological: He is alert and oriented to person, place, and time. Coordination normal.   Skin: Skin is warm. No rash  noted. He is not diaphoretic. No erythema.   Psychiatric: He has a normal mood and affect. His behavior is normal. Judgment and thought content normal.   Nursing note and vitals reviewed.      Assessment:     1. Iron deficiency anemia due to chronic blood loss        Plan:   1.  I had a detailed discussion with the patient today with regard to his current clinical situation.  Review of the patient's CBC from today shows interval improvement in hemoglobin/hematocrit after IV iron infusions.  2.  He knows to seek immediate medical attention/proceed to the emergency room for any signs/symptoms of GI/ bleeding. He is unable to get small bowel video capsule endoscopy because of his pacemaker.  The recommendation from GI is for him to undergo small bowel enteroscopy if he has recurrence of GI bleed.  3.  I have recommended that he continue to stay off Eliquis at this time as risks of bleeding exceed risks of clotting at this time if okay with his cardiologist as well.    Follow-up in 3 months with labs 2 days before clinic visit.  He knows to call sooner if any new problems or questions.    Iron deficiency anemia due to chronic blood loss  -     CBC auto differential; Future; Expected date: 07/17/2018  -     CMP; Future; Expected date: 07/17/2018  -     Iron and TIBC; Future; Expected date: 07/17/2018  -     Ferritin; Future; Expected date: 07/17/2018  -     C-REACTIVE PROTEIN; Future; Expected date: 07/17/2018

## 2018-07-18 ENCOUNTER — PATIENT MESSAGE (OUTPATIENT)
Dept: GASTROENTEROLOGY | Facility: CLINIC | Age: 78
End: 2018-07-18

## 2018-07-25 ENCOUNTER — TELEPHONE (OUTPATIENT)
Dept: RADIOLOGY | Facility: HOSPITAL | Age: 78
End: 2018-07-25

## 2018-07-26 ENCOUNTER — HOSPITAL ENCOUNTER (OUTPATIENT)
Dept: RADIOLOGY | Facility: HOSPITAL | Age: 78
Discharge: HOME | End: 2018-07-26
Attending: NURSE PRACTITIONER
Payer: MEDICARE

## 2018-07-26 DIAGNOSIS — R18.8 OTHER ASCITES: ICD-10-CM

## 2018-07-26 PROCEDURE — 76700 US EXAM ABDOM COMPLETE: CPT | Mod: 26,,, | Performed by: RADIOLOGY

## 2018-07-26 PROCEDURE — 76700 US EXAM ABDOM COMPLETE: CPT | Mod: TC

## 2018-07-31 ENCOUNTER — OFFICE VISIT (OUTPATIENT)
Dept: FAMILY MEDICINE | Facility: CLINIC | Age: 78
End: 2018-07-31
Payer: MEDICARE

## 2018-07-31 ENCOUNTER — LAB VISIT (OUTPATIENT)
Dept: LAB | Facility: HOSPITAL | Age: 78
End: 2018-07-31
Attending: FAMILY MEDICINE
Payer: MEDICARE

## 2018-07-31 VITALS
TEMPERATURE: 97 F | DIASTOLIC BLOOD PRESSURE: 80 MMHG | SYSTOLIC BLOOD PRESSURE: 134 MMHG | OXYGEN SATURATION: 98 % | RESPIRATION RATE: 16 BRPM | WEIGHT: 210.63 LBS | HEIGHT: 68 IN | HEART RATE: 75 BPM | BODY MASS INDEX: 31.92 KG/M2

## 2018-07-31 DIAGNOSIS — I50.9 CONGESTIVE HEART FAILURE, UNSPECIFIED HF CHRONICITY, UNSPECIFIED HEART FAILURE TYPE: ICD-10-CM

## 2018-07-31 DIAGNOSIS — E66.9 OBESITY (BMI 30-39.9): ICD-10-CM

## 2018-07-31 DIAGNOSIS — M51.36 DDD (DEGENERATIVE DISC DISEASE), LUMBAR: ICD-10-CM

## 2018-07-31 DIAGNOSIS — E66.9 DIABETES MELLITUS TYPE 2 IN OBESE: ICD-10-CM

## 2018-07-31 DIAGNOSIS — R60.0 LOWER EXTREMITY EDEMA: Primary | ICD-10-CM

## 2018-07-31 DIAGNOSIS — J43.9 MIXED RESTRICTIVE AND OBSTRUCTIVE LUNG DISEASE: ICD-10-CM

## 2018-07-31 DIAGNOSIS — D71 GRANULOMATOUS DISEASE: ICD-10-CM

## 2018-07-31 DIAGNOSIS — R18.8 OTHER ASCITES: ICD-10-CM

## 2018-07-31 DIAGNOSIS — E11.69 DIABETES MELLITUS TYPE 2 IN OBESE: ICD-10-CM

## 2018-07-31 DIAGNOSIS — I48.91 ATRIAL FIBRILLATION, UNSPECIFIED TYPE: ICD-10-CM

## 2018-07-31 DIAGNOSIS — I70.0 ARTERIOSCLEROSIS OF AORTA: ICD-10-CM

## 2018-07-31 DIAGNOSIS — C61 ADENOCARCINOMA OF PROSTATE: ICD-10-CM

## 2018-07-31 DIAGNOSIS — J98.4 MIXED RESTRICTIVE AND OBSTRUCTIVE LUNG DISEASE: ICD-10-CM

## 2018-07-31 DIAGNOSIS — R60.0 LOWER EXTREMITY EDEMA: ICD-10-CM

## 2018-07-31 DIAGNOSIS — I21.4 NSTEMI (NON-ST ELEVATED MYOCARDIAL INFARCTION): ICD-10-CM

## 2018-07-31 LAB
ALBUMIN SERPL BCP-MCNC: 3.9 G/DL
ALP SERPL-CCNC: 83 U/L
ALT SERPL W/O P-5'-P-CCNC: 8 U/L
ANION GAP SERPL CALC-SCNC: 8 MMOL/L
AST SERPL-CCNC: 18 U/L
BILIRUB SERPL-MCNC: 0.8 MG/DL
BNP SERPL-MCNC: 548 PG/ML
BUN SERPL-MCNC: 11 MG/DL
CALCIUM SERPL-MCNC: 9.7 MG/DL
CHLORIDE SERPL-SCNC: 109 MMOL/L
CO2 SERPL-SCNC: 26 MMOL/L
CREAT SERPL-MCNC: 0.9 MG/DL
EST. GFR  (AFRICAN AMERICAN): >60 ML/MIN/1.73 M^2
EST. GFR  (NON AFRICAN AMERICAN): >60 ML/MIN/1.73 M^2
GLUCOSE SERPL-MCNC: 110 MG/DL
POTASSIUM SERPL-SCNC: 4.3 MMOL/L
PROT SERPL-MCNC: 7.2 G/DL
SODIUM SERPL-SCNC: 143 MMOL/L

## 2018-07-31 PROCEDURE — 3075F SYST BP GE 130 - 139MM HG: CPT | Mod: CPTII,S$GLB,, | Performed by: FAMILY MEDICINE

## 2018-07-31 PROCEDURE — 99499 UNLISTED E&M SERVICE: CPT | Mod: HCNC,S$GLB,, | Performed by: FAMILY MEDICINE

## 2018-07-31 PROCEDURE — 36415 COLL VENOUS BLD VENIPUNCTURE: CPT | Mod: PO

## 2018-07-31 PROCEDURE — 3079F DIAST BP 80-89 MM HG: CPT | Mod: CPTII,S$GLB,, | Performed by: FAMILY MEDICINE

## 2018-07-31 PROCEDURE — 83880 ASSAY OF NATRIURETIC PEPTIDE: CPT

## 2018-07-31 PROCEDURE — 99214 OFFICE O/P EST MOD 30 MIN: CPT | Mod: S$GLB,,, | Performed by: FAMILY MEDICINE

## 2018-07-31 PROCEDURE — 99999 PR PBB SHADOW E&M-EST. PATIENT-LVL III: CPT | Mod: PBBFAC,,, | Performed by: FAMILY MEDICINE

## 2018-07-31 PROCEDURE — 80053 COMPREHEN METABOLIC PANEL: CPT

## 2018-07-31 RX ORDER — FUROSEMIDE 20 MG/1
20 TABLET ORAL DAILY
Qty: 7 TABLET | Refills: 0 | Status: SHIPPED | OUTPATIENT
Start: 2018-07-31 | End: 2018-10-11

## 2018-07-31 RX ORDER — ATORVASTATIN CALCIUM 80 MG/1
TABLET, FILM COATED ORAL
Refills: 3 | COMMUNITY
Start: 2018-07-16 | End: 2020-11-23 | Stop reason: SDUPTHER

## 2018-07-31 RX ORDER — FUROSEMIDE 20 MG/1
TABLET ORAL
Qty: 90 TABLET | Refills: 0 | OUTPATIENT
Start: 2018-07-31

## 2018-07-31 NOTE — PROGRESS NOTES
Subjective:       Patient ID: Jake Ortiz is a 78 y.o. male.    Chief Complaint: Edema    Edema   This is a new problem. The current episode started yesterday. The problem occurs constantly. The problem has been waxing and waning. Pertinent negatives include no arthralgias, chest pain, coughing, fatigue, headaches, joint swelling, neck pain, rash, vomiting or weakness. Nothing aggravates the symptoms. He has tried nothing for the symptoms.   Patient notes acute onset of swelling to the bilateral ankles. Notes no change in diet or activity level. He has not been short of breath. Is followed by non-[facility]. Denies any recent changes to his medication regimen. There has been some weight fluctuation within the past 4-6 weeks. He weighs himself daily and denies any weight gain more than 3-4 pounds at a time. He has been followed by GI for new diagnosis of ascites with recent abdominal ultrasound demonstrating mild findings. Will see NEGIN Ritchie next week for follow-up.     Review of Systems   Constitutional: Negative for activity change, fatigue and unexpected weight change.   HENT: Negative for hearing loss, rhinorrhea and trouble swallowing.    Eyes: Negative for discharge and visual disturbance.   Respiratory: Negative for cough, chest tightness, shortness of breath and wheezing.    Cardiovascular: Positive for leg swelling. Negative for chest pain and palpitations.   Gastrointestinal: Negative for blood in stool, constipation, diarrhea and vomiting.   Endocrine: Negative for polydipsia and polyuria.   Genitourinary: Negative for difficulty urinating, hematuria and urgency.   Musculoskeletal: Positive for back pain. Negative for arthralgias, joint swelling and neck pain.   Skin: Negative for rash.   Neurological: Negative for weakness and headaches.   Psychiatric/Behavioral: Negative for confusion and dysphoric mood.       Objective:   /80   Pulse 75   Temp 97.1 °F (36.2 °C) (Temporal)   Resp 16    "Ht 5' 8" (1.727 m)   Wt 95.5 kg (210 lb 10.4 oz)   SpO2 98%   BMI 32.03 kg/m²   Physical Exam   Constitutional: He is oriented to person, place, and time. He appears well-developed and well-nourished. No distress.   Obese, non-toxic   HENT:   Head: Normocephalic and atraumatic.   Right Ear: External ear normal.   Left Ear: External ear normal.   Nose: Nose normal.   Mouth/Throat: Oropharynx is clear and moist.   Eyes: Conjunctivae and EOM are normal. Pupils are equal, round, and reactive to light.   Neck: Normal range of motion. Neck supple.   Cardiovascular: Normal rate, regular rhythm and normal heart sounds.    Pulmonary/Chest: Effort normal and breath sounds normal.   Abdominal: Soft. Bowel sounds are normal. He exhibits no distension. There is no tenderness.   Musculoskeletal: He exhibits edema (1 plus bilateral LE pitting edema R>L).   Neurological: He is alert and oriented to person, place, and time.   Skin: Skin is warm and dry. He is not diaphoretic.   Psychiatric: He has a normal mood and affect. His behavior is normal.       Assessment:       1. Lower extremity edema    2. Other ascites    3. Congestive heart failure, unspecified HF chronicity, unspecified heart failure type    4. Atrial fibrillation, unspecified type    5. NSTEMI (non-ST elevated myocardial infarction)    6. Diabetes mellitus type 2 in obese    7. Adenocarcinoma of prostate    8. Granulomatous disease    9. Mixed restrictive and obstructive lung disease    10. Arteriosclerosis of aorta    11. DDD (degenerative disc disease), lumbar    12. Obesity (BMI 30-39.9)        Plan:      Lower extremity edema  New. Will initiate Lasix- may need Aldactone in light of underlying ascites. Reviewed w/s. Has CHF and may need to return to see Dr. Menendez.   -     Brain natriuretic peptide; Future; Expected date: 07/31/2018  -     Comprehensive metabolic panel; Future; Expected date: 07/31/2018  -     furosemide (LASIX) 20 MG tablet; Take 1 tablet (20 " mg total) by mouth once daily.  Dispense: 7 tablet; Refill: 0    Other ascites  As above. Recent imaging with findings noted. Will follow-up with GI next week as scheduled- will request possible visit sooner.    Congestive heart failure, unspecified HF chronicity, unspecified heart failure type  As above.    Atrial fibrillation, unspecified type  Rate controlled. Continue with current treatment and follow-up recommendations as per Cardiology.    NSTEMI (non-ST elevated myocardial infarction)  As above. Notes having recent nuclear stress per Cardiology without abnormal findings- records have not been received for review,    Diabetes mellitus type 2 in obese  Stable home readings. Recommend continuation of measures for dietary control. Return as planned for next scheduled chronic visit.    Adenocarcinoma of prostate  Continue as per Heme/Onc.    Granulomatous disease  Continue as per Pulmonology.    Mixed restrictive and obstructive lung disease  Continue as per Pulmonology.    Arteriosclerosis of aorta  BP and lipid control remain advised.    DDD (degenerative disc disease), lumbar  Followed by Dr. Leija. Is requesting RX measures for pain relief. If not provided by his specialist he may need Pain Management evaluation.    Obesity (BMI 30-39.9)  Weight loss efforts are encouraged.

## 2018-07-31 NOTE — TELEPHONE ENCOUNTER
Spoke to kiran, Dr Ames nurse . She states the pt was suppose to get a ct scan done and then follow up with dr Leija. The ct was done at [facility] but she never received the results. I faxed over the ct scan to [phone]. Kiran states she is going to call the pt to schedule the follow up appt.

## 2018-07-31 NOTE — TELEPHONE ENCOUNTER
----- Message from Nany Naik sent at 7/31/2018 10:07 AM CDT -----  Contact: [facility](Lori)[phone]  Returning call, please call back at [phone]. Thanks/ar

## 2018-08-01 ENCOUNTER — TELEPHONE (OUTPATIENT)
Dept: FAMILY MEDICINE | Facility: CLINIC | Age: 78
End: 2018-08-01

## 2018-08-01 ENCOUNTER — PATIENT MESSAGE (OUTPATIENT)
Dept: FAMILY MEDICINE | Facility: CLINIC | Age: 78
End: 2018-08-01

## 2018-08-01 NOTE — TELEPHONE ENCOUNTER
Spoke to the messaging department at Dr riddle office. A message was sent to Dr riddle nurse regarding elevated BNP and the appt needed

## 2018-08-08 ENCOUNTER — OFFICE VISIT (OUTPATIENT)
Dept: GASTROENTEROLOGY | Facility: CLINIC | Age: 78
End: 2018-08-08
Payer: MEDICARE

## 2018-08-08 ENCOUNTER — LAB VISIT (OUTPATIENT)
Dept: LAB | Facility: HOSPITAL | Age: 78
End: 2018-08-08
Attending: NURSE PRACTITIONER
Payer: MEDICARE

## 2018-08-08 VITALS
SYSTOLIC BLOOD PRESSURE: 130 MMHG | BODY MASS INDEX: 30.87 KG/M2 | DIASTOLIC BLOOD PRESSURE: 70 MMHG | WEIGHT: 203.69 LBS | HEART RATE: 80 BPM | HEIGHT: 68 IN

## 2018-08-08 DIAGNOSIS — E78.2 MIXED HYPERLIPIDEMIA: ICD-10-CM

## 2018-08-08 DIAGNOSIS — I10 ESSENTIAL HYPERTENSION: ICD-10-CM

## 2018-08-08 DIAGNOSIS — Z95.1 S/P CABG X 3: ICD-10-CM

## 2018-08-08 DIAGNOSIS — I50.9 CONGESTIVE HEART FAILURE, UNSPECIFIED HF CHRONICITY, UNSPECIFIED HEART FAILURE TYPE: ICD-10-CM

## 2018-08-08 DIAGNOSIS — R18.8 OTHER ASCITES: Primary | ICD-10-CM

## 2018-08-08 DIAGNOSIS — R18.8 OTHER ASCITES: ICD-10-CM

## 2018-08-08 LAB
A1AT SERPL-MCNC: 140 MG/DL
CERULOPLASMIN SERPL-MCNC: 25 MG/DL
FERRITIN SERPL-MCNC: 229 NG/ML
GGT SERPL-CCNC: 58 U/L
IGA SERPL-MCNC: 239 MG/DL
IGG SERPL-MCNC: 1238 MG/DL
IGM SERPL-MCNC: 109 MG/DL
INR PPP: 1.2
IRON SERPL-MCNC: 63 UG/DL
PROTHROMBIN TIME: 12.4 SEC
SATURATED IRON: 20 %
TOTAL IRON BINDING CAPACITY: 321 UG/DL
TRANSFERRIN SERPL-MCNC: 217 MG/DL

## 2018-08-08 PROCEDURE — 83516 IMMUNOASSAY NONANTIBODY: CPT

## 2018-08-08 PROCEDURE — 86038 ANTINUCLEAR ANTIBODIES: CPT

## 2018-08-08 PROCEDURE — 86706 HEP B SURFACE ANTIBODY: CPT

## 2018-08-08 PROCEDURE — 3075F SYST BP GE 130 - 139MM HG: CPT | Mod: CPTII,S$GLB,, | Performed by: NURSE PRACTITIONER

## 2018-08-08 PROCEDURE — 87340 HEPATITIS B SURFACE AG IA: CPT

## 2018-08-08 PROCEDURE — 86704 HEP B CORE ANTIBODY TOTAL: CPT

## 2018-08-08 PROCEDURE — 82784 ASSAY IGA/IGD/IGG/IGM EACH: CPT | Mod: 59

## 2018-08-08 PROCEDURE — 99999 PR PBB SHADOW E&M-EST. PATIENT-LVL III: CPT | Mod: PBBFAC,,, | Performed by: NURSE PRACTITIONER

## 2018-08-08 PROCEDURE — 3078F DIAST BP <80 MM HG: CPT | Mod: CPTII,S$GLB,, | Performed by: NURSE PRACTITIONER

## 2018-08-08 PROCEDURE — 85610 PROTHROMBIN TIME: CPT

## 2018-08-08 PROCEDURE — 86256 FLUORESCENT ANTIBODY TITER: CPT | Mod: 91

## 2018-08-08 PROCEDURE — 86235 NUCLEAR ANTIGEN ANTIBODY: CPT

## 2018-08-08 PROCEDURE — 82728 ASSAY OF FERRITIN: CPT

## 2018-08-08 PROCEDURE — 86790 VIRUS ANTIBODY NOS: CPT

## 2018-08-08 PROCEDURE — 99214 OFFICE O/P EST MOD 30 MIN: CPT | Mod: S$GLB,,, | Performed by: NURSE PRACTITIONER

## 2018-08-08 PROCEDURE — 82977 ASSAY OF GGT: CPT

## 2018-08-08 PROCEDURE — 82390 ASSAY OF CERULOPLASMIN: CPT

## 2018-08-08 PROCEDURE — 86803 HEPATITIS C AB TEST: CPT

## 2018-08-08 PROCEDURE — 82103 ALPHA-1-ANTITRYPSIN TOTAL: CPT

## 2018-08-08 PROCEDURE — 83540 ASSAY OF IRON: CPT

## 2018-08-08 RX ORDER — DEXTROMETHORPHAN HYDROBROMIDE, GUAIFENESIN 5; 100 MG/5ML; MG/5ML
1300 LIQUID ORAL EVERY 6 HOURS PRN
Status: ON HOLD | COMMUNITY
End: 2022-04-30 | Stop reason: HOSPADM

## 2018-08-08 NOTE — PROGRESS NOTES
Clinic Follow Up:  [facility] Follow Up Note    Reason for Follow Up:  The primary encounter diagnosis was Other ascites. Diagnoses of Essential hypertension, Mixed hyperlipidemia, S/P CABG x 3, and Congestive heart failure, unspecified HF chronicity, unspecified heart failure type were also pertinent to this visit.    PCP: Chanda Brush       HPI:  This is a 78 y.o. male here for follow up of the above. He is here to discuss new finding of ascites. Mild ascites noted on recent CT scan and ultrasound of abdomen. No known liver disease. No family history of liver disease. Denies any alcohol use. Liver had normal appearance of imaging. No overt signs of cirrhosis. He was recently started on Lasix 20 mg daily for fluid overload (ascites and lower extremity). This has improved his edema. He has a significant cardiac history that includes CAD, S/P CABG x3, S/P pacemaker, and CHF. Last BNP in the 500s.      Review of Systems   Constitutional: Negative for activity change and appetite change.        As per interval history above   Respiratory: Negative for cough and shortness of breath.    Cardiovascular: Negative for chest pain.   Gastrointestinal: Negative for abdominal pain, blood in stool, constipation, diarrhea, nausea and vomiting.   Skin: Negative for color change and rash.       Medical History:  Past Medical History:   Diagnosis Date    Abnormal PFT     Arthritis     Atrial fibrillation 10/19/2017    Back pain     Congestive heart failure 3/5/2018    Coronary artery disease     Diabetes mellitus 01/2018     am 02/27/2018    Hyperlipemia     Hypertension     Myocardial infarction     Obesity     NASREEN (obstructive sleep apnea) 6/5/2018    Prostate cancer 2015    Tobacco dependence        Surgical History:   Past Surgical History:   Procedure Laterality Date    CORONARY ARTERY BYPASS GRAFT  1987    SPINE SURGERY      fusion    TONSILLECTOMY         Family History:  "  Family History   Problem Relation Age of Onset    Heart attack Mother     Diabetes Mother     Heart disease Mother     Cataracts Mother     Stroke Father     Heart disease Father     Heart disease Brother        Social History:   Social History   Substance Use Topics    Smoking status: Former Smoker     Packs/day: 1.50     Years: 20.00     Types: Cigarettes     Start date: 1968     Quit date: 1988    Smokeless tobacco: Never Used    Alcohol use No       Allergies:   Review of patient's allergies indicates:   Allergen Reactions    Amiodarone      Other reaction(s): bp becomes too low       Home Medications:  Current Outpatient Prescriptions on File Prior to Visit   Medication Sig Dispense Refill    aspirin (ECOTRIN) 81 MG EC tablet Take 81 mg by mouth.      atorvastatin (LIPITOR) 80 MG tablet TK 1 T PO D  3    blood sugar diagnostic Strp Check blood glucose levels daily in the AM fasting and 1-2 times more daily. 100 strip 11    blood-glucose meter kit Use as instructed 1 each 0    cholecalciferol, vitamin D3, (VITAMIN D3) 1,000 unit capsule Take 5,000 Units by mouth once daily.      clopidogrel (PLAVIX) 75 mg tablet Take 75 mg by mouth once daily.      furosemide (LASIX) 20 MG tablet Take 1 tablet (20 mg total) by mouth once daily. 7 tablet 0    lancets Misc Check blood glucose levels daily in the AM fasting and 1-2 times more daily. 100 each 11    lisinopril (PRINIVIL,ZESTRIL) 5 MG tablet TK 1 T PO D  0    multivitamin capsule Take 1 capsule by mouth once daily.      temazepam (RESTORIL) 15 mg Cap Take 15 mg by mouth nightly as needed.  5    nitroGLYCERIN (NITROSTAT) 0.3 MG SL tablet PLACE 1 TABLET UNDER THE TONGUE EVERY 5 MINUTES AS NEEDED FOR CHEST PAIN AS DIRECTED  0     No current facility-administered medications on file prior to visit.        /70   Pulse 80   Ht 5' 8" (1.727 m)   Wt 92.4 kg (203 lb 11.3 oz)   BMI 30.97 kg/m²   Body mass index is 30.97 kg/m².  Physical Exam "   Constitutional: He is oriented to person, place, and time and well-developed, well-nourished, and in no distress. No distress.   HENT:   Head: Normocephalic.   Eyes: Conjunctivae are normal. Pupils are equal, round, and reactive to light.   Cardiovascular: Normal rate, regular rhythm and normal heart sounds.    Pulmonary/Chest: Effort normal and breath sounds normal. No respiratory distress.   Abdominal: Soft. Bowel sounds are normal. He exhibits no distension. There is no tenderness.   Neurological: He is alert and oriented to person, place, and time. No cranial nerve deficit.   Skin: Skin is warm and dry. No rash noted.   Psychiatric: Mood and affect normal.       Labs: Pertinent labs reviewed.    Assessment:  1. Other ascites    2. Essential hypertension    3. Mixed hyperlipidemia    4. S/P CABG x 3    5. Congestive heart failure, unspecified HF chronicity, unspecified heart failure type        Recommendations:  - unclear etiology. Liver vs Cardiac  - Is followed by outside cardiologist and due to see him in December 2018.   - liver appeared normal on recent imaging and labs not suggestive of cirrhosis.  - he has been started on Lasix which patient states improved his ascites. Abdomen is flat and do not feel there is enough fluid safe for diagnostic paracentesis.   - if fluid recollects, would recommend getting a diagnostic paracentesis with SAAG score to better determine etiology   - will proceed with liver workup.   - if workup normal, may ask that he follow up with cardiology to get their input on cardiac etiology of ascites.   -     MARIANO; Future; Expected date: 08/08/2018  -     Antimitochondrial antibody; Future; Expected date: 08/08/2018  -     Anti-smooth muscle antibody; Future; Expected date: 08/08/2018  -     Ferritin; Future; Expected date: 08/08/2018  -     Gamma GT; Future; Expected date: 08/08/2018  -     Iron and TIBC; Future; Expected date: 08/08/2018  -     Hepatitis A antibody, IgG; Future;  Expected date: 08/08/2018  -     Alpha-1-antitrypsin; Future; Expected date: 08/08/2018  -     Ceruloplasmin; Future; Expected date: 08/08/2018  -     Hepatitis B core antibody, total; Future; Expected date: 08/08/2018  -     Hepatitis B surface antibody; Future; Expected date: 08/08/2018  -     Hepatitis B surface antigen; Future; Expected date: 08/08/2018  -     Hepatitis C antibody; Future; Expected date: 08/08/2018  -     Immunoglobulins (IgG, IgA, IgM) Quantitative; Future; Expected date: 08/08/2018  -     Tissue transglutaminase, IgA; Future; Expected date: 08/08/2018  -     Protime-INR; Future; Expected date: 08/08/2018    Return to Clinic:Follow up to be determined after procedure.    Thank you for the opportunity to participate in the care of this patient.  DANIEL Anaya

## 2018-08-09 LAB
ANA SER QL IF: NORMAL
HBV CORE AB SERPL QL IA: NEGATIVE
HBV SURFACE AB SER-ACNC: NEGATIVE M[IU]/ML
HBV SURFACE AG SERPL QL IA: NEGATIVE
HCV AB SERPL QL IA: NEGATIVE
HEPATITIS A ANTIBODY, IGG: NEGATIVE
MITOCHONDRIA AB TITR SER IF: NORMAL {TITER}

## 2018-08-10 LAB
SMOOTH MUSCLE AB TITR SER IF: ABNORMAL {TITER}
TTG IGA SER IA-ACNC: 5 UNITS

## 2018-08-16 ENCOUNTER — LAB VISIT (OUTPATIENT)
Dept: LAB | Facility: HOSPITAL | Age: 78
End: 2018-08-16
Attending: FAMILY MEDICINE
Payer: MEDICARE

## 2018-08-16 DIAGNOSIS — E11.69 DIABETES MELLITUS TYPE 2 IN OBESE: ICD-10-CM

## 2018-08-16 DIAGNOSIS — E66.9 DIABETES MELLITUS TYPE 2 IN OBESE: ICD-10-CM

## 2018-08-16 LAB
ALBUMIN SERPL BCP-MCNC: 4 G/DL
ALP SERPL-CCNC: 87 U/L
ALT SERPL W/O P-5'-P-CCNC: 10 U/L
ANION GAP SERPL CALC-SCNC: 11 MMOL/L
AST SERPL-CCNC: 19 U/L
BILIRUB SERPL-MCNC: 0.6 MG/DL
BUN SERPL-MCNC: 14 MG/DL
CALCIUM SERPL-MCNC: 9.5 MG/DL
CHLORIDE SERPL-SCNC: 105 MMOL/L
CO2 SERPL-SCNC: 24 MMOL/L
CREAT SERPL-MCNC: 1.2 MG/DL
EST. GFR  (AFRICAN AMERICAN): >60 ML/MIN/1.73 M^2
EST. GFR  (NON AFRICAN AMERICAN): 58.4 ML/MIN/1.73 M^2
GLUCOSE SERPL-MCNC: 150 MG/DL
POTASSIUM SERPL-SCNC: 4 MMOL/L
PROT SERPL-MCNC: 7.4 G/DL
SODIUM SERPL-SCNC: 140 MMOL/L

## 2018-08-16 PROCEDURE — 83036 HEMOGLOBIN GLYCOSYLATED A1C: CPT

## 2018-08-16 PROCEDURE — 80053 COMPREHEN METABOLIC PANEL: CPT

## 2018-08-16 PROCEDURE — 36415 COLL VENOUS BLD VENIPUNCTURE: CPT | Mod: PO

## 2018-08-17 LAB
ESTIMATED AVG GLUCOSE: 114 MG/DL
HBA1C MFR BLD HPLC: 5.6 %

## 2018-08-23 ENCOUNTER — OFFICE VISIT (OUTPATIENT)
Dept: FAMILY MEDICINE | Facility: CLINIC | Age: 78
End: 2018-08-23
Payer: MEDICARE

## 2018-08-23 VITALS
TEMPERATURE: 96 F | BODY MASS INDEX: 32.67 KG/M2 | RESPIRATION RATE: 16 BRPM | HEART RATE: 83 BPM | HEIGHT: 67 IN | OXYGEN SATURATION: 99 % | WEIGHT: 208.13 LBS | SYSTOLIC BLOOD PRESSURE: 122 MMHG | DIASTOLIC BLOOD PRESSURE: 78 MMHG

## 2018-08-23 DIAGNOSIS — D71 GRANULOMATOUS DISEASE: ICD-10-CM

## 2018-08-23 DIAGNOSIS — C61 ADENOCARCINOMA OF PROSTATE: ICD-10-CM

## 2018-08-23 DIAGNOSIS — R18.8 OTHER ASCITES: ICD-10-CM

## 2018-08-23 DIAGNOSIS — I70.0 ARTERIOSCLEROSIS OF AORTA: ICD-10-CM

## 2018-08-23 DIAGNOSIS — J98.4 MIXED RESTRICTIVE AND OBSTRUCTIVE LUNG DISEASE: ICD-10-CM

## 2018-08-23 DIAGNOSIS — I48.91 ATRIAL FIBRILLATION, UNSPECIFIED TYPE: ICD-10-CM

## 2018-08-23 DIAGNOSIS — E66.9 DIABETES MELLITUS TYPE 2 IN OBESE: Primary | ICD-10-CM

## 2018-08-23 DIAGNOSIS — R60.0 LOWER EXTREMITY EDEMA: ICD-10-CM

## 2018-08-23 DIAGNOSIS — E11.69 DIABETES MELLITUS TYPE 2 IN OBESE: Primary | ICD-10-CM

## 2018-08-23 DIAGNOSIS — M51.36 DDD (DEGENERATIVE DISC DISEASE), LUMBAR: ICD-10-CM

## 2018-08-23 DIAGNOSIS — E66.9 OBESITY (BMI 30-39.9): ICD-10-CM

## 2018-08-23 DIAGNOSIS — I50.9 CONGESTIVE HEART FAILURE, UNSPECIFIED HF CHRONICITY, UNSPECIFIED HEART FAILURE TYPE: ICD-10-CM

## 2018-08-23 DIAGNOSIS — J43.9 MIXED RESTRICTIVE AND OBSTRUCTIVE LUNG DISEASE: ICD-10-CM

## 2018-08-23 PROBLEM — I21.4 NSTEMI (NON-ST ELEVATED MYOCARDIAL INFARCTION): Status: RESOLVED | Noted: 2018-06-22 | Resolved: 2018-08-23

## 2018-08-23 PROCEDURE — 99214 OFFICE O/P EST MOD 30 MIN: CPT | Mod: S$GLB,,, | Performed by: FAMILY MEDICINE

## 2018-08-23 PROCEDURE — 3074F SYST BP LT 130 MM HG: CPT | Mod: CPTII,S$GLB,, | Performed by: FAMILY MEDICINE

## 2018-08-23 PROCEDURE — 3078F DIAST BP <80 MM HG: CPT | Mod: CPTII,S$GLB,, | Performed by: FAMILY MEDICINE

## 2018-08-23 PROCEDURE — 99999 PR PBB SHADOW E&M-EST. PATIENT-LVL IV: CPT | Mod: PBBFAC,,, | Performed by: FAMILY MEDICINE

## 2018-08-23 PROCEDURE — 99499 UNLISTED E&M SERVICE: CPT | Mod: HCNC,S$GLB,, | Performed by: FAMILY MEDICINE

## 2018-08-23 NOTE — PROGRESS NOTES
Subjective:       Patient ID: Jake Ortiz is a 78 y.o. male.    Chief Complaint: Back Pain and Chronic Care    Back Pain   This is a recurrent problem. The current episode started more than 1 year ago. The problem occurs constantly. The problem has been waxing and waning since onset. The pain is present in the lumbar spine. The quality of the pain is described as aching. The pain does not radiate. The pain is at a severity of 7/10. The pain is moderate. The pain is the same all the time. The symptoms are aggravated by bending. Stiffness is present all day. Pertinent negatives include no abdominal pain, bladder incontinence, bowel incontinence, chest pain, dysuria, fever, headaches, leg pain, numbness, paresis, paresthesias, pelvic pain, perianal numbness, tingling, weakness or weight loss. Risk factors include obesity and poor posture. He has tried analgesics, NSAIDs and heat for the symptoms. The treatment provided no relief.   Patient has been evaluated by Dr. Leija for these concerns. Notes he has not been scheduled for follow-up. Reports he had CT scan last month and has not had any further evaluation. He has been using topical analgesics for relief.     Chronic Care  Patient has had recent lab evaluation in light of type 2 DM. He is diet controlled having been evaluated by nutrition/diabetic education. Has been maintaining a blood glucose log with a range of fasting levels from 's. This AM his glucose was 120. There are no symptoms of hyper or hypoglycemia reported. Diabetic eye and foot examinations are up to date. Has been taking Lasix for lower extremity edema since last visit with improvement. Reports last visit with Cardiology was in May 2018 and that he called Dr. Menendez's office for RX of Lasix without having an updated evaluation. Per Care Everywhere he suffered NSTEMI in June 2018. Denies any CP or palpitations. He does monitor his weight daily and there has been a 2 pound weight change  "(decrease) since last visit. States he has been given a sleep aid by his Cardiologist and this has afforded benefit (Temazepam). Since his last visit he has been evaluated by GI for ascites. No further workup is planned at this time. Denies any abdominal pain or change in bowel habits.     Review of Systems   Constitutional: Positive for activity change. Negative for appetite change, fever and weight loss.   HENT: Negative for congestion, ear pain and sinus pressure.    Eyes: Negative for visual disturbance.   Respiratory: Negative for cough and shortness of breath.    Cardiovascular: Positive for leg swelling. Negative for chest pain and palpitations.   Gastrointestinal: Negative for abdominal pain, bowel incontinence, constipation and diarrhea.   Endocrine: Negative for polydipsia, polyphagia and polyuria.   Genitourinary: Negative for bladder incontinence, decreased urine volume, difficulty urinating, dysuria and pelvic pain.   Musculoskeletal: Positive for back pain.   Skin: Negative for rash.   Neurological: Negative for dizziness, tingling, weakness, numbness, headaches and paresthesias.   Psychiatric/Behavioral: Negative for dysphoric mood and sleep disturbance. The patient is not nervous/anxious.        Objective:   /78   Pulse 83   Temp 96.3 °F (35.7 °C) (Temporal)   Resp 16   Ht 5' 7" (1.702 m)   Wt 94.4 kg (208 lb 1.8 oz)   SpO2 99%   BMI 32.60 kg/m²   Physical Exam   Constitutional: He is oriented to person, place, and time. He appears well-developed and well-nourished. No distress.   Obese, non-toxic   HENT:   Head: Normocephalic and atraumatic.   Right Ear: Tympanic membrane, external ear and ear canal normal.   Left Ear: Tympanic membrane, external ear and ear canal normal.   Nose: Nose normal.   Mouth/Throat: Oropharynx is clear and moist.   Eyes: Conjunctivae and EOM are normal. Pupils are equal, round, and reactive to light.   Neck: Normal range of motion. Neck supple. "   Cardiovascular: Normal rate, regular rhythm and normal heart sounds.   Pulmonary/Chest: Effort normal and breath sounds normal.   Abdominal: Soft. Bowel sounds are normal.   Musculoskeletal: He exhibits no edema.        Lumbar back: He exhibits tenderness and pain. He exhibits normal range of motion.        Back:    Neurological: He is alert and oriented to person, place, and time.   Skin: Skin is warm and dry. He is not diaphoretic.   Psychiatric: He has a normal mood and affect. His behavior is normal.       Assessment:       1. Diabetes mellitus type 2 in obese    2. Lower extremity edema    3. Other ascites    4. Congestive heart failure, unspecified HF chronicity, unspecified heart failure type    5. Atrial fibrillation, unspecified type    6. Granulomatous disease    7. Adenocarcinoma of prostate    8. Mixed restrictive and obstructive lung disease    9. Arteriosclerosis of aorta    10. DDD (degenerative disc disease), lumbar    11. Obesity (BMI 30-39.9)        Plan:      Diabetes mellitus type 2 in obese  Stable. Continue with current treatment. Target A1c remains <7.0. AM fasting glucose goals have been reviewed (). Recommend routine annual diabetic eye and foot examination. Will plan to reassess in 3 months with labs prior to the visit.   -     Hemoglobin A1c; Future; Expected date: 08/23/2018    Lower extremity edema  Stable with daily dosing of Lasix. Advised compliance with treatment. Monitor sodium intake. Elevate the extremities while at rest.   -     Comprehensive metabolic panel; Future; Expected date: 08/23/2018    Other ascites  Continue as per GI.    Congestive heart failure, unspecified HF chronicity, unspecified heart failure type  It is unclear per patient's account as to whether he has had an updated evaluation with his Cardiologist of late- will reach out to the office of Dr. Menendez for further information.  -     Comprehensive metabolic panel; Future; Expected date:  08/23/2018    Atrial fibrillation, unspecified type  Rate controlled. Continue as per Cardiology.    Granulomatous disease  Continue as per Pulmonology.    Adenocarcinoma of prostate  Continue as per Urology.    Mixed restrictive and obstructive lung disease  As above, continue as per Pulmonology.    Arteriosclerosis of aorta  BP and lipid control remain advised.    DDD (degenerative disc disease), lumbar  Discussed his recent workup per Dr. Leija. At his request his CT findings were reviewed. He is applying topical measures with some improvement. Discussed heat alternating with ice. Will have staff reach out to Dr. Leija's office as patient has not been re-scheduled for evaluation. PT eval in the interim.  -     Ambulatory Referral to Physical/Occupational Therapy    Obesity (BMI 30-39.9)  Weight loss efforts remain encouraged.       Answers for HPI/ROS submitted by the patient on 8/20/2018   Back pain  genital pain: No

## 2018-08-24 ENCOUNTER — TELEPHONE (OUTPATIENT)
Dept: FAMILY MEDICINE | Facility: CLINIC | Age: 78
End: 2018-08-24

## 2018-08-24 ENCOUNTER — PATIENT MESSAGE (OUTPATIENT)
Dept: FAMILY MEDICINE | Facility: CLINIC | Age: 78
End: 2018-08-24

## 2018-08-28 NOTE — TELEPHONE ENCOUNTER
Did we ever hear from Dr. Menendez's office? Has he been seen since the NSTEMI in June 2018? His timeline does not seem to correlate as far as when he reports having his visits and when he has been hospitalized- I want to ensure that his Cardiologist is following him with the new edema that he has.

## 2018-09-04 ENCOUNTER — TELEPHONE (OUTPATIENT)
Dept: GASTROENTEROLOGY | Facility: CLINIC | Age: 78
End: 2018-09-04

## 2018-09-04 NOTE — TELEPHONE ENCOUNTER
Discussed case with Dr. Mccollum. Will monitor for now. New ascites is concerning for cirrhosis but can also be seen with CHF. He is to follow up with Cardiologist tomorrow. Will follow up with patient in 6 months or sooner if ascites re-collects as we may need to get a diagnostic paracentesis for fluid evaluation.

## 2018-09-05 ENCOUNTER — TELEPHONE (OUTPATIENT)
Dept: GASTROENTEROLOGY | Facility: CLINIC | Age: 78
End: 2018-09-05

## 2018-09-05 NOTE — TELEPHONE ENCOUNTER
----- Message from Mayi Ritchie NP sent at 9/5/2018  1:56 PM CDT -----  Contact: pt   Please return patient's phone call about recommendations for follow up.   ----- Message -----  From: Blanquita Pabon LPN  Sent: 9/5/2018   1:53 PM  To: Mayi Ritchie NP        ----- Message -----  From: Rhea Walker  Sent: 9/5/2018   1:51 PM  To: [facility] Staff    The pt states he is returning a missed call, the pt can be reached at [phone]///[id number]

## 2018-09-27 ENCOUNTER — PATIENT MESSAGE (OUTPATIENT)
Dept: FAMILY MEDICINE | Facility: CLINIC | Age: 78
End: 2018-09-27

## 2018-10-03 ENCOUNTER — OFFICE VISIT (OUTPATIENT)
Dept: FAMILY MEDICINE | Facility: CLINIC | Age: 78
End: 2018-10-03
Payer: MEDICARE

## 2018-10-03 ENCOUNTER — OFFICE VISIT (OUTPATIENT)
Dept: GASTROENTEROLOGY | Facility: CLINIC | Age: 78
End: 2018-10-03
Payer: MEDICARE

## 2018-10-03 ENCOUNTER — LAB VISIT (OUTPATIENT)
Dept: LAB | Facility: HOSPITAL | Age: 78
End: 2018-10-03
Attending: NURSE PRACTITIONER
Payer: MEDICARE

## 2018-10-03 VITALS
HEIGHT: 67 IN | DIASTOLIC BLOOD PRESSURE: 79 MMHG | SYSTOLIC BLOOD PRESSURE: 133 MMHG | OXYGEN SATURATION: 96 % | TEMPERATURE: 97 F | RESPIRATION RATE: 18 BRPM | HEART RATE: 78 BPM | BODY MASS INDEX: 33.01 KG/M2 | WEIGHT: 210.31 LBS

## 2018-10-03 VITALS
BODY MASS INDEX: 33.19 KG/M2 | HEIGHT: 67 IN | SYSTOLIC BLOOD PRESSURE: 120 MMHG | WEIGHT: 211.44 LBS | DIASTOLIC BLOOD PRESSURE: 60 MMHG | HEART RATE: 64 BPM

## 2018-10-03 DIAGNOSIS — M51.36 DDD (DEGENERATIVE DISC DISEASE), LUMBAR: ICD-10-CM

## 2018-10-03 DIAGNOSIS — J98.4 MIXED RESTRICTIVE AND OBSTRUCTIVE LUNG DISEASE: Primary | Chronic | ICD-10-CM

## 2018-10-03 DIAGNOSIS — C61 ADENOCARCINOMA OF PROSTATE: ICD-10-CM

## 2018-10-03 DIAGNOSIS — I10 ESSENTIAL HYPERTENSION: ICD-10-CM

## 2018-10-03 DIAGNOSIS — K21.9 GASTROESOPHAGEAL REFLUX DISEASE WITHOUT ESOPHAGITIS: ICD-10-CM

## 2018-10-03 DIAGNOSIS — R76.8 RHEUMATOID FACTOR POSITIVE: ICD-10-CM

## 2018-10-03 DIAGNOSIS — D50.0 IRON DEFICIENCY ANEMIA DUE TO CHRONIC BLOOD LOSS: ICD-10-CM

## 2018-10-03 DIAGNOSIS — R19.7 DIARRHEA, UNSPECIFIED TYPE: ICD-10-CM

## 2018-10-03 DIAGNOSIS — J43.9 MIXED RESTRICTIVE AND OBSTRUCTIVE LUNG DISEASE: Primary | Chronic | ICD-10-CM

## 2018-10-03 DIAGNOSIS — I70.0 ARTERIOSCLEROSIS OF AORTA: ICD-10-CM

## 2018-10-03 DIAGNOSIS — R14.0 ABDOMINAL BLOATING: ICD-10-CM

## 2018-10-03 DIAGNOSIS — D71 GRANULOMATOUS DISEASE: ICD-10-CM

## 2018-10-03 DIAGNOSIS — G47.33 OBSTRUCTIVE SLEEP APNEA: ICD-10-CM

## 2018-10-03 DIAGNOSIS — J98.4 CHRONIC RESTRICTIVE LUNG DISEASE: ICD-10-CM

## 2018-10-03 DIAGNOSIS — I48.0 PAROXYSMAL ATRIAL FIBRILLATION: ICD-10-CM

## 2018-10-03 DIAGNOSIS — R18.8 OTHER ASCITES: Primary | ICD-10-CM

## 2018-10-03 DIAGNOSIS — I25.10 CORONARY ARTERY DISEASE INVOLVING NATIVE CORONARY ARTERY OF NATIVE HEART WITHOUT ANGINA PECTORIS: ICD-10-CM

## 2018-10-03 DIAGNOSIS — R18.8 OTHER ASCITES: ICD-10-CM

## 2018-10-03 LAB
ALBUMIN SERPL BCP-MCNC: 3.4 G/DL
ALP SERPL-CCNC: 106 U/L
ALT SERPL W/O P-5'-P-CCNC: 15 U/L
ANION GAP SERPL CALC-SCNC: 10 MMOL/L
APTT BLDCRRT: 30.2 SEC
AST SERPL-CCNC: 33 U/L
BASOPHILS # BLD AUTO: 0.03 K/UL
BASOPHILS NFR BLD: 0.7 %
BILIRUB SERPL-MCNC: 0.7 MG/DL
BUN SERPL-MCNC: 17 MG/DL
CALCIUM SERPL-MCNC: 9.8 MG/DL
CHLORIDE SERPL-SCNC: 106 MMOL/L
CO2 SERPL-SCNC: 23 MMOL/L
CREAT SERPL-MCNC: 1.2 MG/DL
CRP SERPL-MCNC: 81.2 MG/L
DIFFERENTIAL METHOD: ABNORMAL
EOSINOPHIL # BLD AUTO: 0 K/UL
EOSINOPHIL NFR BLD: 0.9 %
ERYTHROCYTE [DISTWIDTH] IN BLOOD BY AUTOMATED COUNT: 15 %
EST. GFR  (AFRICAN AMERICAN): >60 ML/MIN/1.73 M^2
EST. GFR  (NON AFRICAN AMERICAN): 58.4 ML/MIN/1.73 M^2
GLUCOSE SERPL-MCNC: 113 MG/DL
HCT VFR BLD AUTO: 37.7 %
HGB BLD-MCNC: 11.9 G/DL
IMM GRANULOCYTES # BLD AUTO: 0.02 K/UL
IMM GRANULOCYTES NFR BLD AUTO: 0.5 %
INR PPP: 1.4
LYMPHOCYTES # BLD AUTO: 0.4 K/UL
LYMPHOCYTES NFR BLD: 8.4 %
MCH RBC QN AUTO: 29.2 PG
MCHC RBC AUTO-ENTMCNC: 31.6 G/DL
MCV RBC AUTO: 92 FL
MONOCYTES # BLD AUTO: 0.5 K/UL
MONOCYTES NFR BLD: 11.4 %
NEUTROPHILS # BLD AUTO: 3.4 K/UL
NEUTROPHILS NFR BLD: 78.1 %
NRBC BLD-RTO: 0 /100 WBC
PLATELET # BLD AUTO: 294 K/UL
PMV BLD AUTO: 10.4 FL
POTASSIUM SERPL-SCNC: 4 MMOL/L
PROT SERPL-MCNC: 7.6 G/DL
PROTHROMBIN TIME: 14.2 SEC
RBC # BLD AUTO: 4.08 M/UL
SODIUM SERPL-SCNC: 139 MMOL/L
WBC # BLD AUTO: 4.4 K/UL

## 2018-10-03 PROCEDURE — 96160 PT-FOCUSED HLTH RISK ASSMT: CPT | Mod: PBBFAC,PO | Performed by: NURSE PRACTITIONER

## 2018-10-03 PROCEDURE — 36415 COLL VENOUS BLD VENIPUNCTURE: CPT

## 2018-10-03 PROCEDURE — 86140 C-REACTIVE PROTEIN: CPT

## 2018-10-03 PROCEDURE — 3074F SYST BP LT 130 MM HG: CPT | Mod: CPTII,,, | Performed by: NURSE PRACTITIONER

## 2018-10-03 PROCEDURE — 99213 OFFICE O/P EST LOW 20 MIN: CPT | Mod: PBBFAC,25 | Performed by: NURSE PRACTITIONER

## 2018-10-03 PROCEDURE — 85025 COMPLETE CBC W/AUTO DIFF WBC: CPT

## 2018-10-03 PROCEDURE — 3078F DIAST BP <80 MM HG: CPT | Mod: CPTII,,, | Performed by: NURSE PRACTITIONER

## 2018-10-03 PROCEDURE — 85730 THROMBOPLASTIN TIME PARTIAL: CPT

## 2018-10-03 PROCEDURE — 85610 PROTHROMBIN TIME: CPT

## 2018-10-03 PROCEDURE — 1101F PT FALLS ASSESS-DOCD LE1/YR: CPT | Mod: CPTII,,, | Performed by: NURSE PRACTITIONER

## 2018-10-03 PROCEDURE — 80053 COMPREHEN METABOLIC PANEL: CPT

## 2018-10-03 PROCEDURE — 99999 PR PBB SHADOW E&M-EST. PATIENT-LVL III: CPT | Mod: PBBFAC,,, | Performed by: NURSE PRACTITIONER

## 2018-10-03 PROCEDURE — 99214 OFFICE O/P EST MOD 30 MIN: CPT | Mod: S$PBB,,, | Performed by: NURSE PRACTITIONER

## 2018-10-03 PROCEDURE — 99214 OFFICE O/P EST MOD 30 MIN: CPT | Mod: PBBFAC,27,PO,25 | Performed by: NURSE PRACTITIONER

## 2018-10-03 PROCEDURE — 90662 IIV NO PRSV INCREASED AG IM: CPT | Mod: PBBFAC,PO

## 2018-10-03 PROCEDURE — 99499 UNLISTED E&M SERVICE: CPT | Mod: HCNC,S$GLB,, | Performed by: NURSE PRACTITIONER

## 2018-10-03 PROCEDURE — 99999 PR PBB SHADOW E&M-EST. PATIENT-LVL IV: CPT | Mod: PBBFAC,,, | Performed by: NURSE PRACTITIONER

## 2018-10-03 PROCEDURE — 96160 PT-FOCUSED HLTH RISK ASSMT: CPT | Mod: S$PBB,,, | Performed by: NURSE PRACTITIONER

## 2018-10-03 NOTE — PROGRESS NOTES
"Jake Ortiz presented for a  Medicare AWV and comprehensive Health Risk Assessment today. The following components were reviewed and updated:    · Medical history  · Family History  · Social history  · Allergies and Current Medications  · Health Risk Assessment  · Health Maintenance  · Care Team     ** See Completed Assessments for Annual Wellness Visit within the encounter summary.**       The following assessments were completed:  · Living Situation  · CAGE  · Depression Screening  · Timed Get Up and Go  · Whisper Test  · Cognitive Function Screening  · Nutrition Screening  · ADL Screening  · PAQ Screening    Vitals:    10/03/18 1003   BP: 133/79   Pulse: 78   Resp: 18   Temp: 97 °F (36.1 °C)   TempSrc: Tympanic   SpO2: 96%   Weight: 95.4 kg (210 lb 5.1 oz)   Height: 5' 7" (1.702 m)     Body mass index is 32.94 kg/m².  Physical Exam      Diagnoses and health risks identified today and associated recommendations/orders:    1. Mixed restrictive and obstructive lung disease  Managed per pulmo. Continue current treatment plan    2. Essential hypertension  Stable     3. DDD (degenerative disc disease), lumbar  Stable. Continue current treatment plan    4. Granulomatous disease  Managed per pulmo    5. Adenocarcinoma of prostate  remission    6. Arteriosclerosis of aorta  Stable continue statin therapy and bp control    7. Paroxysmal atrial fibrillation  Stable. Managed per cardiology continue current treatment plan    8. Gastroesophageal reflux disease without esophagitis  stable    9. Obstructive sleep apnea  Stable. Continue current treatment plan    10. Iron deficiency anemia due to chronic blood loss  Managed per heme/onc    11. Coronary artery disease involving native coronary artery of native heart without angina pectoris  Stable. Continue current treatment plan    12. Chronic restrictive lung disease  Managed per pulmo continue current treatment plan    13. Rheumatoid factor positive        Provided Jake with " a 5-10 year written screening schedule and personal prevention plan. Recommendations were developed using the USPSTF age appropriate recommendations. Education, counseling, and referrals were provided as needed. After Visit Summary printed and given to patient which includes a list of additional screenings\tests needed.    No Follow-up on file.    Shraddha Alvarenga NP

## 2018-10-03 NOTE — PROGRESS NOTES
Clinic Follow Up:  [facility] Follow Up Note    Reason for Follow Up:  The primary encounter diagnosis was Other ascites. Diagnoses of Abdominal bloating and Diarrhea, unspecified type were also pertinent to this visit.    PCP: Chanda Brush       HPI:  This is a 78 y.o. male here for follow up of the above. Ascites first noted a few months ago incidentally on CT scan. Liver workup was unrevealing. Cardiology adjusted diuretics and ascites improved. Discussed case with Dr. Mccollum, who recommends monitoring and if fluid recollects, should get diagnostic paracentesis as his ascites is concerning for liver cirrhosis. He feels like his ascites is recollecting despite Lasix. He also has been having diarrhea for the last 4 weeks. He has multiple loose to watery stools per day. No hematochezia or melena. Also feels gassy.     Review of Systems   Constitutional: Negative for activity change and appetite change.   HENT: Negative for sore throat and trouble swallowing.    Eyes: Negative for pain and discharge.   Respiratory: Negative for cough and chest tightness.    Cardiovascular: Negative for chest pain and palpitations.   Gastrointestinal: Positive for abdominal distention and diarrhea. Negative for abdominal pain, anal bleeding, blood in stool, constipation, nausea, rectal pain and vomiting.   Genitourinary: Negative for dysuria, frequency and hematuria.   Skin: Negative for color change and rash.   Neurological: Negative for speech difficulty, weakness and headaches.   Psychiatric/Behavioral: Negative for confusion and sleep disturbance.       Medical History:  Past Medical History:   Diagnosis Date    Abnormal PFT     Anemia     Arthritis     Atrial fibrillation 10/19/2017    Back pain     Congestive heart failure 3/5/2018    Coronary artery disease     Diabetes mellitus 01/2018     am 02/27/2018    Hyperlipemia     Hypertension     Myocardial infarction     Obesity      NASREEN (obstructive sleep apnea) 2018    Prostate cancer 2015    Tobacco dependence        Surgical History:   Past Surgical History:   Procedure Laterality Date    CORONARY ARTERY BYPASS GRAFT      SPINE SURGERY      fusion    TONSILLECTOMY         Family History:   Family History   Problem Relation Age of Onset    Heart attack Mother     Diabetes Mother     Heart disease Mother     Cataracts Mother     Stroke Father     Heart disease Father     Heart disease Brother        Social History:   Social History     Tobacco Use    Smoking status: Former Smoker     Packs/day: 1.50     Years: 20.00     Pack years: 30.00     Types: Cigarettes     Start date:      Last attempt to quit:      Years since quittin.7    Smokeless tobacco: Never Used   Substance Use Topics    Alcohol use: No    Drug use: No       Allergies: Review of patient's allergies indicates:  No Known Allergies    Home Medications:  Current Outpatient Medications on File Prior to Visit   Medication Sig Dispense Refill    acetaminophen (TYLENOL) 500 MG tablet Take 500 mg by mouth every 6 (six) hours as needed for Pain.      aspirin (ECOTRIN) 81 MG EC tablet Take 81 mg by mouth.      atorvastatin (LIPITOR) 80 MG tablet TK 1 T PO D  3    blood sugar diagnostic Strp Check blood glucose levels daily in the AM fasting and 1-2 times more daily. 100 strip 11    blood-glucose meter kit Use as instructed 1 each 0    cholecalciferol, vitamin D3, (VITAMIN D3) 1,000 unit capsule Take 5,000 Units by mouth once daily.      clopidogrel (PLAVIX) 75 mg tablet Take 75 mg by mouth once daily.      furosemide (LASIX) 20 MG tablet Take 1 tablet (20 mg total) by mouth once daily. 7 tablet 0    Lactobacillus rhamnosus GG (CULTURELLE) 10 billion cell capsule Take 1 capsule by mouth once daily.      lancets Misc Check blood glucose levels daily in the AM fasting and 1-2 times more daily. 100 each 11    lisinopril (PRINIVIL,ZESTRIL) 5 MG  "tablet TK 1 T PO D  0    multivitamin capsule Take 1 capsule by mouth once daily.      simethicone (GAS-X ORAL) Take by mouth.      temazepam (RESTORIL) 15 mg Cap Take 15 mg by mouth nightly as needed.  5    nitroGLYCERIN (NITROSTAT) 0.3 MG SL tablet PLACE 1 TABLET UNDER THE TONGUE EVERY 5 MINUTES AS NEEDED FOR CHEST PAIN AS DIRECTED  0     No current facility-administered medications on file prior to visit.        /60   Pulse 64   Ht 5' 7" (1.702 m)   Wt 95.9 kg (211 lb 6.7 oz)   BMI 33.11 kg/m²   Body mass index is 33.11 kg/m².  Physical Exam   Constitutional: He is oriented to person, place, and time and well-developed, well-nourished, and in no distress. No distress.   HENT:   Head: Normocephalic.   Eyes: Conjunctivae are normal. Pupils are equal, round, and reactive to light.   Cardiovascular: Normal rate, regular rhythm and normal heart sounds.   Pulmonary/Chest: Effort normal and breath sounds normal. No respiratory distress.   Abdominal: Soft. Bowel sounds are normal. He exhibits distension. There is no tenderness.   Neurological: He is alert and oriented to person, place, and time. No cranial nerve deficit.   Skin: Skin is warm and dry. No rash noted.   Psychiatric: Mood and affect normal.       Labs: Pertinent labs reviewed.    Assessment:  1. Other ascites    2. Abdominal bloating    3. Diarrhea, unspecified type        Recommendations:  Other ascites  - no overt liver cirrhosis but ascites is concerning for advanced liver disease  - feels like fluid is re-collecting. Will check ultrasound to ensure there is enough fluid for paracentesis. If so, will get diagnostic paracentesis for further evaluation.   -     CBC auto differential; Future; Expected date: 10/03/2018  -     Comprehensive metabolic panel; Future; Expected date: 10/03/2018  -     Protime-INR; Future; Expected date: 10/03/2018  -     APTT; Future; Expected date: 10/03/2018  -     US Abdomen Complete; Future; Expected date: " 10/03/2018    Abdominal bloating  Diarrhea, unspecified type  - workup as below.   -     CBC auto differential; Future; Expected date: 10/03/2018  -     Comprehensive metabolic panel; Future; Expected date: 10/03/2018  -     C-reactive protein; Future; Expected date: 10/03/2018  -     WBC, Stool; Future; Expected date: 10/03/2018  -     Stool Exam-Ova,Cysts,Parasites; Future; Expected date: 10/03/2018  -     Stool culture; Future; Expected date: 10/03/2018  -     Clostridium difficile EIA; Future; Expected date: 10/03/2018    Return to Clinic:  Follow up to be determined after results.    Thank you for the opportunity to participate in the care of this patient.  DANIEL Anaya

## 2018-10-04 ENCOUNTER — LAB VISIT (OUTPATIENT)
Dept: LAB | Facility: HOSPITAL | Age: 78
End: 2018-10-04
Attending: NURSE PRACTITIONER
Payer: MEDICARE

## 2018-10-04 DIAGNOSIS — R14.0 ABDOMINAL BLOATING: ICD-10-CM

## 2018-10-04 DIAGNOSIS — R19.7 DIARRHEA, UNSPECIFIED TYPE: ICD-10-CM

## 2018-10-04 LAB
C DIFF GDH STL QL: NEGATIVE
C DIFF TOX A+B STL QL IA: NEGATIVE
WBC #/AREA STL HPF: NORMAL /[HPF]

## 2018-10-04 PROCEDURE — 87324 CLOSTRIDIUM AG IA: CPT

## 2018-10-04 PROCEDURE — 87209 SMEAR COMPLEX STAIN: CPT

## 2018-10-04 PROCEDURE — 89055 LEUKOCYTE ASSESSMENT FECAL: CPT

## 2018-10-04 PROCEDURE — 87427 SHIGA-LIKE TOXIN AG IA: CPT

## 2018-10-04 PROCEDURE — 87045 FECES CULTURE AEROBIC BACT: CPT

## 2018-10-04 PROCEDURE — 87046 STOOL CULTR AEROBIC BACT EA: CPT

## 2018-10-05 ENCOUNTER — PATIENT MESSAGE (OUTPATIENT)
Dept: GASTROENTEROLOGY | Facility: CLINIC | Age: 78
End: 2018-10-05

## 2018-10-05 LAB
E COLI SXT1 STL QL IA: NEGATIVE
E COLI SXT2 STL QL IA: NEGATIVE
O+P STL TRI STN: NORMAL

## 2018-10-07 LAB — BACTERIA STL CULT: NORMAL

## 2018-10-10 ENCOUNTER — HOSPITAL ENCOUNTER (OUTPATIENT)
Dept: RADIOLOGY | Facility: HOSPITAL | Age: 78
Discharge: HOME | End: 2018-10-10
Attending: NURSE PRACTITIONER
Payer: MEDICARE

## 2018-10-10 DIAGNOSIS — R18.8 OTHER ASCITES: ICD-10-CM

## 2018-10-10 PROCEDURE — 76700 US EXAM ABDOM COMPLETE: CPT | Mod: TC

## 2018-10-11 ENCOUNTER — PATIENT MESSAGE (OUTPATIENT)
Dept: GASTROENTEROLOGY | Facility: CLINIC | Age: 78
End: 2018-10-11

## 2018-10-11 DIAGNOSIS — R18.8 OTHER ASCITES: Primary | ICD-10-CM

## 2018-10-11 DIAGNOSIS — Z79.01 CURRENT USE OF LONG TERM ANTICOAGULATION: ICD-10-CM

## 2018-10-11 RX ORDER — FUROSEMIDE 20 MG/1
20 TABLET ORAL 2 TIMES DAILY
Qty: 60 TABLET | Refills: 5 | Status: SHIPPED | OUTPATIENT
Start: 2018-10-11 | End: 2020-04-21 | Stop reason: SDUPTHER

## 2018-10-11 NOTE — H&P (VIEW-ONLY)
Message sent to patient but please notify him of the following recommendations:  - paracentesis (drainage of fluid). Need stat labs prior  - needs transjugular liver biopsy (may be able to use same stat labs)  - is on anticoagulation. Please find out from IR how long needs to be held prior to procedures  - increase lasix to 20 mg twice a day  - will need repeat labs 1 week after medication change but depending on when procedures are, that may be good enough.

## 2018-10-16 ENCOUNTER — TELEPHONE (OUTPATIENT)
Dept: GASTROENTEROLOGY | Facility: CLINIC | Age: 78
End: 2018-10-16

## 2018-10-16 NOTE — TELEPHONE ENCOUNTER
Can you please call and scheduled patient for a transjuglur biopsy. Patient is scheduled for a Parascities for next Wednesday if possible to schedule own the same day.

## 2018-10-18 ENCOUNTER — TELEPHONE (OUTPATIENT)
Dept: RADIOLOGY | Facility: HOSPITAL | Age: 78
End: 2018-10-18

## 2018-10-19 ENCOUNTER — TELEPHONE (OUTPATIENT)
Dept: GASTROENTEROLOGY | Facility: CLINIC | Age: 78
End: 2018-10-19

## 2018-10-19 NOTE — TELEPHONE ENCOUNTER
Received clearance from [place] at Dr. Ferrell's office for pt to stop Plavix 5 days prior to procedures on 10/24/18 and 10/31/18. Pt notified.

## 2018-10-24 ENCOUNTER — HOSPITAL ENCOUNTER (OUTPATIENT)
Dept: RADIOLOGY | Facility: HOSPITAL | Age: 78
Discharge: HOME | End: 2018-10-24
Attending: NURSE PRACTITIONER
Payer: MEDICARE

## 2018-10-24 VITALS
HEART RATE: 60 BPM | SYSTOLIC BLOOD PRESSURE: 141 MMHG | DIASTOLIC BLOOD PRESSURE: 77 MMHG | RESPIRATION RATE: 16 BRPM | OXYGEN SATURATION: 99 % | TEMPERATURE: 98 F | HEIGHT: 69 IN | WEIGHT: 202 LBS | BODY MASS INDEX: 29.92 KG/M2

## 2018-10-24 DIAGNOSIS — R18.8 OTHER ASCITES: ICD-10-CM

## 2018-10-24 LAB
APPEARANCE FLD: NORMAL
BODY FLD TYPE: NORMAL
COLOR FLD: YELLOW
LYMPHOCYTES NFR FLD MANUAL: 48 %
MONOS+MACROS NFR FLD MANUAL: 33 %
NEUTROPHILS NFR FLD MANUAL: 19 %
WBC # FLD: 971 /CU MM

## 2018-10-24 PROCEDURE — 82042 OTHER SOURCE ALBUMIN QUAN EA: CPT

## 2018-10-24 PROCEDURE — 87077 CULTURE AEROBIC IDENTIFY: CPT

## 2018-10-24 PROCEDURE — 49083 ABD PARACENTESIS W/IMAGING: CPT

## 2018-10-24 PROCEDURE — 87205 SMEAR GRAM STAIN: CPT

## 2018-10-24 PROCEDURE — 63600175 PHARM REV CODE 636 W HCPCS: Mod: JG | Performed by: NURSE PRACTITIONER

## 2018-10-24 PROCEDURE — 87070 CULTURE OTHR SPECIMN AEROBIC: CPT

## 2018-10-24 PROCEDURE — 87075 CULTR BACTERIA EXCEPT BLOOD: CPT

## 2018-10-24 PROCEDURE — 84157 ASSAY OF PROTEIN OTHER: CPT

## 2018-10-24 PROCEDURE — 87186 SC STD MICRODIL/AGAR DIL: CPT

## 2018-10-24 PROCEDURE — 88305 TISSUE EXAM BY PATHOLOGIST: CPT | Mod: 26,,, | Performed by: PATHOLOGY

## 2018-10-24 PROCEDURE — P9047 ALBUMIN (HUMAN), 25%, 50ML: HCPCS | Mod: JG | Performed by: NURSE PRACTITIONER

## 2018-10-24 PROCEDURE — 89051 BODY FLUID CELL COUNT: CPT

## 2018-10-24 PROCEDURE — A7048 VACUUM DRAIN BOTTLE/TUBE KIT: HCPCS

## 2018-10-24 PROCEDURE — 88112 CYTOPATH CELL ENHANCE TECH: CPT | Performed by: PATHOLOGY

## 2018-10-24 PROCEDURE — 88112 CYTOPATH CELL ENHANCE TECH: CPT | Mod: 26,,, | Performed by: PATHOLOGY

## 2018-10-24 RX ORDER — ALBUMIN HUMAN 250 G/1000ML
50 SOLUTION INTRAVENOUS ONCE
Status: COMPLETED | OUTPATIENT
Start: 2018-10-24 | End: 2018-10-24

## 2018-10-24 RX ADMIN — ALBUMIN (HUMAN) 50 G: 25 SOLUTION INTRAVENOUS at 02:10

## 2018-10-24 NOTE — DISCHARGE SUMMARY
Sterile technique was performed in the RLQ, lidocaine was used as a local anesthetic.  5 liters of light josé antonio fluid removed and sent to lab.  Pt tolerated the procedure well without immediate complications.  Please see radiologist report for details. F/u with PCP and/or ordering physician.

## 2018-10-24 NOTE — NURSING
5L light josé antonio fluid removed from abdomen. Pt tolerated well, vss. samples sent to lab for testing. Albumin infused per md's orders. Pt monitored for 30 min post procedure; phong noted. D/c instructions explained and copy given to patient. Pt verbalized understanding and all questions answered. Pt walked to waiting room unassisted to meet spouse for transport home

## 2018-10-24 NOTE — DISCHARGE INSTRUCTIONS

## 2018-10-25 LAB — PATH INTERP FLD-IMP: NORMAL

## 2018-10-26 LAB
ALBUMIN FLD-MCNC: 1130 MG/DL
PROT FLD-MCNC: 1.9 G/DL
SPECIMEN SOURCE: NORMAL
SPECIMEN SOURCE: NORMAL

## 2018-10-29 LAB
BACTERIA FLD AEROBE CULT: NORMAL
GRAM STN SPEC: NORMAL
GRAM STN SPEC: NORMAL

## 2018-10-30 ENCOUNTER — TELEPHONE (OUTPATIENT)
Dept: RADIOLOGY | Facility: HOSPITAL | Age: 78
End: 2018-10-30

## 2018-10-31 ENCOUNTER — HOSPITAL ENCOUNTER (OUTPATIENT)
Facility: HOSPITAL | Age: 78
Discharge: HOME | End: 2018-10-31
Attending: RADIOLOGY | Admitting: RADIOLOGY
Payer: MEDICARE

## 2018-10-31 VITALS
HEART RATE: 60 BPM | DIASTOLIC BLOOD PRESSURE: 59 MMHG | BODY MASS INDEX: 33.12 KG/M2 | OXYGEN SATURATION: 97 % | TEMPERATURE: 98 F | HEIGHT: 67 IN | SYSTOLIC BLOOD PRESSURE: 113 MMHG | WEIGHT: 211 LBS | RESPIRATION RATE: 18 BRPM

## 2018-10-31 DIAGNOSIS — R18.8 ASCITES: ICD-10-CM

## 2018-10-31 DIAGNOSIS — R18.8 OTHER ASCITES: ICD-10-CM

## 2018-10-31 LAB
ALBUMIN SERPL BCP-MCNC: 3.6 G/DL
ALP SERPL-CCNC: 112 U/L
ALT SERPL W/O P-5'-P-CCNC: 15 U/L
ANION GAP SERPL CALC-SCNC: 14 MMOL/L
APTT BLDCRRT: 31.5 SEC
AST SERPL-CCNC: 31 U/L
BASOPHILS # BLD AUTO: 0.02 K/UL
BASOPHILS NFR BLD: 0.5 %
BILIRUB SERPL-MCNC: 0.9 MG/DL
BUN SERPL-MCNC: 12 MG/DL
CALCIUM SERPL-MCNC: 9.5 MG/DL
CHLORIDE SERPL-SCNC: 105 MMOL/L
CO2 SERPL-SCNC: 22 MMOL/L
CREAT SERPL-MCNC: 1 MG/DL
DIFFERENTIAL METHOD: ABNORMAL
EOSINOPHIL # BLD AUTO: 0.1 K/UL
EOSINOPHIL NFR BLD: 2.8 %
ERYTHROCYTE [DISTWIDTH] IN BLOOD BY AUTOMATED COUNT: 15.3 %
EST. GFR  (AFRICAN AMERICAN): >60 ML/MIN/1.73 M^2
EST. GFR  (NON AFRICAN AMERICAN): >60 ML/MIN/1.73 M^2
GLUCOSE SERPL-MCNC: 106 MG/DL
HCT VFR BLD AUTO: 34.9 %
HGB BLD-MCNC: 11.6 G/DL
INR PPP: 1.3
LYMPHOCYTES # BLD AUTO: 0.6 K/UL
LYMPHOCYTES NFR BLD: 13.8 %
MCH RBC QN AUTO: 29.3 PG
MCHC RBC AUTO-ENTMCNC: 33.2 G/DL
MCV RBC AUTO: 88 FL
MONOCYTES # BLD AUTO: 0.3 K/UL
MONOCYTES NFR BLD: 8.3 %
NEUTROPHILS # BLD AUTO: 3 K/UL
NEUTROPHILS NFR BLD: 74.6 %
PLATELET # BLD AUTO: 297 K/UL
PMV BLD AUTO: 9.3 FL
POTASSIUM SERPL-SCNC: 3.8 MMOL/L
PROT SERPL-MCNC: 7.9 G/DL
PROTHROMBIN TIME: 13.3 SEC
RBC # BLD AUTO: 3.96 M/UL
SODIUM SERPL-SCNC: 141 MMOL/L
WBC # BLD AUTO: 3.98 K/UL

## 2018-10-31 PROCEDURE — 88307 TISSUE EXAM BY PATHOLOGIST: CPT | Performed by: PATHOLOGY

## 2018-10-31 PROCEDURE — 25500020 PHARM REV CODE 255

## 2018-10-31 PROCEDURE — 85610 PROTHROMBIN TIME: CPT

## 2018-10-31 PROCEDURE — 99152 MOD SED SAME PHYS/QHP 5/>YRS: CPT

## 2018-10-31 PROCEDURE — 88313 SPECIAL STAINS GROUP 2: CPT | Mod: 59 | Performed by: PATHOLOGY

## 2018-10-31 PROCEDURE — 88307 TISSUE EXAM BY PATHOLOGIST: CPT | Mod: 26,,, | Performed by: PATHOLOGY

## 2018-10-31 PROCEDURE — 85025 COMPLETE CBC W/AUTO DIFF WBC: CPT

## 2018-10-31 PROCEDURE — 80053 COMPREHEN METABOLIC PANEL: CPT

## 2018-10-31 PROCEDURE — 63600175 PHARM REV CODE 636 W HCPCS

## 2018-10-31 PROCEDURE — 85730 THROMBOPLASTIN TIME PARTIAL: CPT

## 2018-10-31 PROCEDURE — 88313 SPECIAL STAINS GROUP 2: CPT | Mod: 26,,, | Performed by: PATHOLOGY

## 2018-10-31 NOTE — INTERVAL H&P NOTE
The patient has been examined and the H&P has been reviewed:    I concur with the findings and no changes have occurred since H&P was written.    Anesthesia/Surgery risks, benefits and alternative options discussed and understood by patient/family.          Active Hospital Problems    Diagnosis  POA    Other ascites [R18.8]  Yes     Priority: High      Resolved Hospital Problems   No resolved problems to display.

## 2018-10-31 NOTE — DISCHARGE INSTRUCTIONS
POST PROCEDURE INSTRUCTIONS    · ACTIVITY/ SAFETY: BECAUSE OF THE AFTER EFFECT OF THE SEDATION, WE ADVISE YOU TO REFRAIN FROM THE FOLLOWING ACTIVITIES FOR AT LEAST 12-16 HOURS:    A. DO NOT DRIVE A CAR OR OPERATE MACHINERY. YOUR  REFLEXES  AND COORDINATION ARE ALTERED.    B. HAVE STANDBY ASSISTANCE ON STAIRWAYS.    C. DO NOT RETURN TO WORK.    D. DO NOT OPERATE APPLIANCES IF ALONE (I.E.STOVE,IRON,LAWN   MOWER)    E. DO NOT SIGN IMPORTANT PAPERS OR MAKE IMPORTANT DECISIONS.    F.  A RESPONSIBLE PERSON MUST STAY WITH THE PATIENT FOR 12   HOURS POST PROCEDURE.    G. YOU MAY SHOWER OR BATHE THE NEXT DAY.      · MEDICATIONS: 1.) RESUME TAKING YOUR USUAL MEDICINES AS SOON AS YOU START TO EAT. TAKE ONLY THE MEDICINES THAT YOUR DOCTORS PRESCRIBED OR APPROVED. 2.) THERE IS ONLY MINOR PAIN AFTER. IF YOUR DOCTOR SAYS IT IS OK FOR YOU TO TAKE ACETAMINOPHEN (TYLENOL), THIS SHOULD EASE ANY DISCOMFORT YOU HAVE. IF YOUR DOCTOR EXPECTS YOU TO HAVE MORE SEVERE PAIN, YOU WILL RECEIVE A PRESCRIPTION FOR A STRONGER PAIN MEDICINE.    · WHEN TO CALL: CALL US RIGHT AWAY IF YOU HAVE : 1. BLEEDING IN YOUR NECK WHERE THE CATHETER WAS INSERTED. 2. ABDOMINAL PAIN.  3. DIZZINESS

## 2018-10-31 NOTE — DISCHARGE SUMMARY
Radiology Discharge Summary      Hospital Course: No complications    Admit Date: 10/31/2018  Discharge Date: 10/31/2018     Instructions Given to Patient: Yes  Diet: regular diet and Resume prior diet  Activity: activity as tolerated    Description of Condition on Discharge: Stable  Vital Signs (Most Recent): Temp: 97.6 °F (36.4 °C) (10/31/18 1053)  Pulse: 85 (10/31/18 1053)  Resp: 15 (10/31/18 1053)  BP: 125/70 (10/31/18 1053)    Discharge Disposition: Home    Discharge Diagnosis: elevated lfts     Follow-up: with hepatology as scheduled    Corwin Landin MD  Staff Radiologist  Department of Radiology  Pager: [phone]

## 2018-10-31 NOTE — PROCEDURES
Radiology Post-Procedure Note    Pre Op Diagnosis: Elevated LFTs    Post Op Diagnosis: Elevated LFTs    Procedure: Transjugular liver biposy    Procedure performed by: Dr Corwin Landin    Written Informed Consent Obtained: Yes    Specimen Removed: YES liver    Estimated Blood Loss: Minimal    Findings: Local anesthesia and moderate sedation were used.    A right-sided transjugular approach was used to performed hepatic venography, pressure measurements and liver biopsy.  5 random specimens of the right hepatic lobe were obtained and sent to pathology for further evaluation.    Hemostasis of the right internal jugular vein was achieved using manual pressure and there was no hematoma.    The patient tolerated the procedure well and there were no complications.  Please see Imaging report for further details.    Corwin Landin MD  Staff Radiologist  Department of Radiology  Pager: [phone]

## 2018-10-31 NOTE — PLAN OF CARE
1430 DISCHARGE INST. REVIEWED.  COPY GIVEN.  1440 IV REMOVED.  1445 iv removed.  1450 discharged home. To exit via w/c

## 2018-11-01 LAB — BACTERIA SPEC ANAEROBE CULT: NORMAL

## 2018-11-02 ENCOUNTER — PATIENT MESSAGE (OUTPATIENT)
Dept: GASTROENTEROLOGY | Facility: CLINIC | Age: 78
End: 2018-11-02

## 2018-11-16 ENCOUNTER — LAB VISIT (OUTPATIENT)
Dept: LAB | Facility: HOSPITAL | Age: 78
End: 2018-11-16
Attending: FAMILY MEDICINE
Payer: MEDICARE

## 2018-11-16 DIAGNOSIS — E66.9 DIABETES MELLITUS TYPE 2 IN OBESE: ICD-10-CM

## 2018-11-16 DIAGNOSIS — E11.69 DIABETES MELLITUS TYPE 2 IN OBESE: ICD-10-CM

## 2018-11-16 DIAGNOSIS — R60.0 LOWER EXTREMITY EDEMA: ICD-10-CM

## 2018-11-16 DIAGNOSIS — D64.9 ANEMIA, UNSPECIFIED TYPE: ICD-10-CM

## 2018-11-16 DIAGNOSIS — I50.9 CONGESTIVE HEART FAILURE, UNSPECIFIED HF CHRONICITY, UNSPECIFIED HEART FAILURE TYPE: ICD-10-CM

## 2018-11-16 LAB
ALBUMIN SERPL BCP-MCNC: 3.3 G/DL
ALP SERPL-CCNC: 119 U/L
ALT SERPL W/O P-5'-P-CCNC: 16 U/L
ANION GAP SERPL CALC-SCNC: 9 MMOL/L
AST SERPL-CCNC: 33 U/L
BASOPHILS # BLD AUTO: 0.03 K/UL
BASOPHILS NFR BLD: 0.8 %
BILIRUB SERPL-MCNC: 0.8 MG/DL
BUN SERPL-MCNC: 14 MG/DL
CALCIUM SERPL-MCNC: 9.2 MG/DL
CHLORIDE SERPL-SCNC: 106 MMOL/L
CO2 SERPL-SCNC: 27 MMOL/L
CREAT SERPL-MCNC: 1.1 MG/DL
DIFFERENTIAL METHOD: ABNORMAL
EOSINOPHIL # BLD AUTO: 0.2 K/UL
EOSINOPHIL NFR BLD: 5.1 %
ERYTHROCYTE [DISTWIDTH] IN BLOOD BY AUTOMATED COUNT: 16.2 %
EST. GFR  (AFRICAN AMERICAN): >60 ML/MIN/1.73 M^2
EST. GFR  (NON AFRICAN AMERICAN): >60 ML/MIN/1.73 M^2
ESTIMATED AVG GLUCOSE: 114 MG/DL
GLUCOSE SERPL-MCNC: 109 MG/DL
HBA1C MFR BLD HPLC: 5.6 %
HCT VFR BLD AUTO: 35.4 %
HGB BLD-MCNC: 11.6 G/DL
IMM GRANULOCYTES # BLD AUTO: 0.01 K/UL
IMM GRANULOCYTES NFR BLD AUTO: 0.3 %
LYMPHOCYTES # BLD AUTO: 0.6 K/UL
LYMPHOCYTES NFR BLD: 15.1 %
MCH RBC QN AUTO: 29.7 PG
MCHC RBC AUTO-ENTMCNC: 32.8 G/DL
MCV RBC AUTO: 91 FL
MONOCYTES # BLD AUTO: 0.5 K/UL
MONOCYTES NFR BLD: 12.8 %
NEUTROPHILS # BLD AUTO: 2.6 K/UL
NEUTROPHILS NFR BLD: 65.9 %
NRBC BLD-RTO: 0 /100 WBC
PLATELET # BLD AUTO: 281 K/UL
PMV BLD AUTO: 10.2 FL
POTASSIUM SERPL-SCNC: 4.2 MMOL/L
PROT SERPL-MCNC: 7.3 G/DL
RBC # BLD AUTO: 3.9 M/UL
SODIUM SERPL-SCNC: 142 MMOL/L
WBC # BLD AUTO: 3.9 K/UL

## 2018-11-16 PROCEDURE — 83036 HEMOGLOBIN GLYCOSYLATED A1C: CPT | Mod: HCNC

## 2018-11-16 PROCEDURE — 85025 COMPLETE CBC W/AUTO DIFF WBC: CPT | Mod: HCNC

## 2018-11-16 PROCEDURE — 80053 COMPREHEN METABOLIC PANEL: CPT | Mod: HCNC

## 2018-11-16 PROCEDURE — 36415 COLL VENOUS BLD VENIPUNCTURE: CPT | Mod: HCNC,PO

## 2018-11-21 ENCOUNTER — OFFICE VISIT (OUTPATIENT)
Dept: FAMILY MEDICINE | Facility: CLINIC | Age: 78
End: 2018-11-21
Payer: MEDICARE

## 2018-11-21 VITALS
WEIGHT: 191.13 LBS | RESPIRATION RATE: 16 BRPM | TEMPERATURE: 98 F | DIASTOLIC BLOOD PRESSURE: 60 MMHG | BODY MASS INDEX: 28.31 KG/M2 | HEART RATE: 74 BPM | HEIGHT: 69 IN | SYSTOLIC BLOOD PRESSURE: 120 MMHG | OXYGEN SATURATION: 99 %

## 2018-11-21 DIAGNOSIS — D50.0 IRON DEFICIENCY ANEMIA DUE TO CHRONIC BLOOD LOSS: ICD-10-CM

## 2018-11-21 DIAGNOSIS — R18.8 OTHER ASCITES: ICD-10-CM

## 2018-11-21 DIAGNOSIS — J43.9 MIXED RESTRICTIVE AND OBSTRUCTIVE LUNG DISEASE: ICD-10-CM

## 2018-11-21 DIAGNOSIS — G47.33 OBSTRUCTIVE SLEEP APNEA: ICD-10-CM

## 2018-11-21 DIAGNOSIS — J98.4 MIXED RESTRICTIVE AND OBSTRUCTIVE LUNG DISEASE: ICD-10-CM

## 2018-11-21 DIAGNOSIS — C61 ADENOCARCINOMA OF PROSTATE: ICD-10-CM

## 2018-11-21 DIAGNOSIS — I10 ESSENTIAL HYPERTENSION: ICD-10-CM

## 2018-11-21 DIAGNOSIS — I48.0 PAROXYSMAL ATRIAL FIBRILLATION: ICD-10-CM

## 2018-11-21 DIAGNOSIS — I25.10 CORONARY ARTERY DISEASE INVOLVING NATIVE CORONARY ARTERY OF NATIVE HEART WITHOUT ANGINA PECTORIS: ICD-10-CM

## 2018-11-21 DIAGNOSIS — Z95.0 PACEMAKER: ICD-10-CM

## 2018-11-21 DIAGNOSIS — I50.9 CONGESTIVE HEART FAILURE, UNSPECIFIED HF CHRONICITY, UNSPECIFIED HEART FAILURE TYPE: ICD-10-CM

## 2018-11-21 DIAGNOSIS — I70.0 ARTERIOSCLEROSIS OF AORTA: ICD-10-CM

## 2018-11-21 DIAGNOSIS — Z79.01 CURRENT USE OF ANTICOAGULANT THERAPY: ICD-10-CM

## 2018-11-21 DIAGNOSIS — E11.9 DIABETES MELLITUS TYPE 2 IN NONOBESE: Primary | ICD-10-CM

## 2018-11-21 DIAGNOSIS — D71 GRANULOMATOUS DISEASE: ICD-10-CM

## 2018-11-21 PROCEDURE — 99999 PR PBB SHADOW E&M-EST. PATIENT-LVL III: CPT | Mod: PBBFAC,HCNC,, | Performed by: FAMILY MEDICINE

## 2018-11-21 PROCEDURE — 3078F DIAST BP <80 MM HG: CPT | Mod: CPTII,HCNC,S$GLB, | Performed by: FAMILY MEDICINE

## 2018-11-21 PROCEDURE — 3074F SYST BP LT 130 MM HG: CPT | Mod: CPTII,HCNC,S$GLB, | Performed by: FAMILY MEDICINE

## 2018-11-21 PROCEDURE — 99214 OFFICE O/P EST MOD 30 MIN: CPT | Mod: HCNC,S$GLB,, | Performed by: FAMILY MEDICINE

## 2018-11-21 PROCEDURE — 1101F PT FALLS ASSESS-DOCD LE1/YR: CPT | Mod: CPTII,HCNC,S$GLB, | Performed by: FAMILY MEDICINE

## 2018-11-21 PROCEDURE — 99499 UNLISTED E&M SERVICE: CPT | Mod: S$GLB,,, | Performed by: FAMILY MEDICINE

## 2018-11-21 RX ORDER — SPIRONOLACTONE 50 MG/1
TABLET, FILM COATED ORAL
Refills: 6 | COMMUNITY
Start: 2018-11-14 | End: 2020-09-14

## 2018-11-21 RX ORDER — CLONAZEPAM 1 MG/1
TABLET ORAL
Refills: 5 | COMMUNITY
Start: 2018-11-14 | End: 2020-05-20 | Stop reason: SDUPTHER

## 2018-11-21 NOTE — PROGRESS NOTES
"Subjective:       Patient ID: Jake Ortiz is a 78 y.o. male.    Chief Complaint: Chronic Care    HPI   Chronic Care  77yo male presents today for chronic care assessment. He has had recent lab update and is here for review of results. A1c is measured at 5.6. He notes his glucose levels have been in the 90's. No symptoms of hyper or hypoglycemia are reported. Diabetic eye and foot examination are up to date. He remains anemic though his levels have been stable- current H&H is 11.6 and 35.4. He has been seeing his Cardiologist (Dr. Menednez) and his regimen has been adjusted of late. He is now on Entresto and he reports that his edema has been improving. He has recently undergone paracentesis for ascites and reports having 5L of fluid removed. Weight has decreased to 191 from 208 at last check in August 2018. Denies any CP or SOB. He has been contemplating exercising of late and will discuss this with Cardiology at his upcoming visit.    Review of Systems   Constitutional: Negative for activity change and unexpected weight change.   HENT: Negative for hearing loss, rhinorrhea and trouble swallowing.    Eyes: Negative for discharge and visual disturbance.   Respiratory: Negative for chest tightness and wheezing.    Cardiovascular: Negative for chest pain and palpitations.   Gastrointestinal: Negative for blood in stool, constipation, diarrhea and vomiting.   Endocrine: Negative for polydipsia and polyuria.   Genitourinary: Negative for difficulty urinating, hematuria and urgency.   Musculoskeletal: Negative for arthralgias, joint swelling and neck pain.   Neurological: Negative for weakness and headaches.   Psychiatric/Behavioral: Negative for confusion and dysphoric mood.       Objective:   /60   Pulse 74   Temp 97.5 °F (36.4 °C) (Temporal)   Resp 16   Ht 5' 9" (1.753 m)   Wt 86.7 kg (191 lb 2.2 oz)   SpO2 99%   BMI 28.23 kg/m²   Physical Exam   Constitutional: He is oriented to person, place, and time. " He appears well-developed and well-nourished. No distress.   HENT:   Head: Normocephalic and atraumatic.   Right Ear: External ear normal.   Left Ear: External ear normal.   Nose: Nose normal.   Mouth/Throat: Oropharynx is clear and moist.   Eyes: Conjunctivae and EOM are normal. Pupils are equal, round, and reactive to light.   Neck: Normal range of motion. Neck supple.   Cardiovascular: Normal rate, regular rhythm and normal heart sounds.   Pulmonary/Chest: Effort normal and breath sounds normal.   Abdominal: Soft. Bowel sounds are normal.   Musculoskeletal: He exhibits edema (pedal edema).   Neurological: He is alert and oriented to person, place, and time.   Skin: Skin is warm and dry. He is not diaphoretic.   Psychiatric: He has a normal mood and affect. His behavior is normal.       Assessment:       1. Diabetes mellitus type 2 in nonobese    2. Essential hypertension    3. Paroxysmal atrial fibrillation    4. Congestive heart failure, unspecified HF chronicity, unspecified heart failure type    5. Other ascites    6. Iron deficiency anemia due to chronic blood loss    7. Mixed restrictive and obstructive lung disease    8. Granulomatous disease    9. Obstructive sleep apnea    10. Adenocarcinoma of prostate    11. Arteriosclerosis of aorta    12. Coronary artery disease involving native coronary artery of native heart without angina pectoris    13. Current use of anticoagulant therapy    14. Pacemaker        Plan:      Diabetes mellitus type 2 in nonobese  Stable. Continue with current treatment. Target A1c remains <7.5 given age and co-morbidities. AM fasting glucose goals have been reviewed (). Recommend routine annual diabetic eye and foot examination. Will plan to reassess in 3 months with labs prior to the visit.   -     Hemoglobin A1c; Future; Expected date: 11/21/2018  -     Comprehensive metabolic panel; Future; Expected date: 11/21/2018  -     Lipid panel; Future; Expected date: 11/21/2018  -      Microalbumin/creatinine urine ratio; Future; Expected date: 11/21/2018    Essential hypertension  Stable. Continue with current treatment. Target BP goal remains<140/90. Heart healthy diet is advised. Intermittent home monitoring has been discussed.    -     Comprehensive metabolic panel; Future; Expected date: 11/21/2018    Paroxysmal atrial fibrillation  Rate controlled. Continue as per Cardiology.    Congestive heart failure, unspecified HF chronicity, unspecified heart failure type  Continue as per Cardiology. Advised compliance with dietary recommendations.    Other ascites  Continue as per GI.    Iron deficiency anemia due to chronic blood loss  Will arrange for lab update and schedule with Hematology for follow-up.  -     CBC auto differential; Future; Expected date: 11/21/2018  -     Iron and TIBC; Future; Expected date: 11/21/2018  -     Ferritin; Future; Expected date: 11/21/2018    Mixed restrictive and obstructive lung disease  Recommend updated assessment as per Pulmonology. Patient declines further scheduling for follow-up and attributes his previous pulmonary symptoms to anemia.    Granulomatous disease  As above.    Obstructive sleep apnea  Not currently using CPAP- risks and benefits discussed. He declines further pulmonary evaluation as above.    Adenocarcinoma of prostate  Continue as per Urology.    Arteriosclerosis of aorta  BP and lipid control remain advised.    Coronary artery disease involving native coronary artery of native heart without angina pectoris  Continue as per Cardiology.    Current use of anticoagulant therapy  As per Cardiology.    Pacemaker  As per Cardiology.

## 2018-11-27 ENCOUNTER — LAB VISIT (OUTPATIENT)
Dept: LAB | Facility: HOSPITAL | Age: 78
End: 2018-11-27
Attending: INTERNAL MEDICINE
Payer: MEDICARE

## 2018-11-27 ENCOUNTER — PATIENT MESSAGE (OUTPATIENT)
Dept: FAMILY MEDICINE | Facility: CLINIC | Age: 78
End: 2018-11-27

## 2018-11-27 DIAGNOSIS — D50.0 IRON DEFICIENCY ANEMIA DUE TO CHRONIC BLOOD LOSS: ICD-10-CM

## 2018-11-27 DIAGNOSIS — E11.9 DIABETES MELLITUS TYPE 2 IN NONOBESE: ICD-10-CM

## 2018-11-27 LAB
ALBUMIN SERPL BCP-MCNC: 3.6 G/DL
ALP SERPL-CCNC: 109 U/L
ALT SERPL W/O P-5'-P-CCNC: 10 U/L
ANION GAP SERPL CALC-SCNC: 8 MMOL/L
AST SERPL-CCNC: 24 U/L
BASOPHILS # BLD AUTO: 0.04 K/UL
BASOPHILS NFR BLD: 1 %
BILIRUB SERPL-MCNC: 0.7 MG/DL
BUN SERPL-MCNC: 16 MG/DL
CALCIUM SERPL-MCNC: 9.6 MG/DL
CHLORIDE SERPL-SCNC: 104 MMOL/L
CHOLEST SERPL-MCNC: 118 MG/DL
CHOLEST/HDLC SERPL: 3.4 {RATIO}
CO2 SERPL-SCNC: 28 MMOL/L
CREAT SERPL-MCNC: 1.1 MG/DL
CRP SERPL-MCNC: 12.3 MG/L
DIFFERENTIAL METHOD: ABNORMAL
EOSINOPHIL # BLD AUTO: 0.2 K/UL
EOSINOPHIL NFR BLD: 3.8 %
ERYTHROCYTE [DISTWIDTH] IN BLOOD BY AUTOMATED COUNT: 16.1 %
EST. GFR  (AFRICAN AMERICAN): >60 ML/MIN/1.73 M^2
EST. GFR  (NON AFRICAN AMERICAN): >60 ML/MIN/1.73 M^2
FERRITIN SERPL-MCNC: 305 NG/ML
GLUCOSE SERPL-MCNC: 104 MG/DL
HCT VFR BLD AUTO: 37.2 %
HDLC SERPL-MCNC: 35 MG/DL
HDLC SERPL: 29.7 %
HGB BLD-MCNC: 12.1 G/DL
IRON SERPL-MCNC: 62 UG/DL
LDLC SERPL CALC-MCNC: 63.2 MG/DL
LYMPHOCYTES # BLD AUTO: 0.6 K/UL
LYMPHOCYTES NFR BLD: 13.8 %
MCH RBC QN AUTO: 30.2 PG
MCHC RBC AUTO-ENTMCNC: 32.5 G/DL
MCV RBC AUTO: 93 FL
MONOCYTES # BLD AUTO: 0.4 K/UL
MONOCYTES NFR BLD: 8.8 %
NEUTROPHILS # BLD AUTO: 3 K/UL
NEUTROPHILS NFR BLD: 72.6 %
NONHDLC SERPL-MCNC: 83 MG/DL
NRBC BLD-RTO: 0 /100 WBC
PLATELET # BLD AUTO: 282 K/UL
PMV BLD AUTO: 9.8 FL
POTASSIUM SERPL-SCNC: 4.2 MMOL/L
PROT SERPL-MCNC: 8.2 G/DL
RBC # BLD AUTO: 4.01 M/UL
SATURATED IRON: 22 %
SODIUM SERPL-SCNC: 140 MMOL/L
TOTAL IRON BINDING CAPACITY: 287 UG/DL
TRANSFERRIN SERPL-MCNC: 194 MG/DL
TRIGL SERPL-MCNC: 99 MG/DL
WBC # BLD AUTO: 4.19 K/UL

## 2018-11-27 PROCEDURE — 80053 COMPREHEN METABOLIC PANEL: CPT | Mod: HCNC

## 2018-11-27 PROCEDURE — 86140 C-REACTIVE PROTEIN: CPT | Mod: HCNC

## 2018-11-27 PROCEDURE — 80061 LIPID PANEL: CPT | Mod: HCNC

## 2018-11-27 PROCEDURE — 85025 COMPLETE CBC W/AUTO DIFF WBC: CPT | Mod: HCNC

## 2018-11-27 PROCEDURE — 82728 ASSAY OF FERRITIN: CPT | Mod: HCNC

## 2018-11-27 PROCEDURE — 36415 COLL VENOUS BLD VENIPUNCTURE: CPT | Mod: HCNC,PO

## 2018-11-27 PROCEDURE — 83540 ASSAY OF IRON: CPT | Mod: HCNC

## 2018-11-28 ENCOUNTER — PATIENT MESSAGE (OUTPATIENT)
Dept: FAMILY MEDICINE | Facility: CLINIC | Age: 78
End: 2018-11-28

## 2019-01-15 ENCOUNTER — PATIENT MESSAGE (OUTPATIENT)
Dept: GASTROENTEROLOGY | Facility: CLINIC | Age: 79
End: 2019-01-15

## 2019-03-12 ENCOUNTER — OFFICE VISIT (OUTPATIENT)
Dept: FAMILY MEDICINE | Facility: CLINIC | Age: 79
End: 2019-03-12
Payer: MEDICARE

## 2019-03-12 ENCOUNTER — OFFICE VISIT (OUTPATIENT)
Dept: OPHTHALMOLOGY | Facility: CLINIC | Age: 79
End: 2019-03-12
Payer: MEDICARE

## 2019-03-12 VITALS
OXYGEN SATURATION: 99 % | DIASTOLIC BLOOD PRESSURE: 76 MMHG | WEIGHT: 183.19 LBS | TEMPERATURE: 97 F | HEART RATE: 74 BPM | SYSTOLIC BLOOD PRESSURE: 133 MMHG | BODY MASS INDEX: 27.05 KG/M2

## 2019-03-12 DIAGNOSIS — J30.9 ALLERGIC RHINITIS, UNSPECIFIED SEASONALITY, UNSPECIFIED TRIGGER: ICD-10-CM

## 2019-03-12 DIAGNOSIS — J30.9 ALLERGIC RHINITIS, UNSPECIFIED SEASONALITY, UNSPECIFIED TRIGGER: Primary | ICD-10-CM

## 2019-03-12 DIAGNOSIS — H52.4 BILATERAL PRESBYOPIA: ICD-10-CM

## 2019-03-12 DIAGNOSIS — E11.9 DIABETES MELLITUS TYPE 2 WITHOUT RETINOPATHY: Primary | ICD-10-CM

## 2019-03-12 DIAGNOSIS — H52.03 HYPEROPIA, BILATERAL: ICD-10-CM

## 2019-03-12 DIAGNOSIS — H25.13 CATARACT, NUCLEAR SCLEROTIC SENILE, BILATERAL: ICD-10-CM

## 2019-03-12 LAB
CTP QC/QA: YES
S PYO RRNA THROAT QL PROBE: NEGATIVE

## 2019-03-12 PROCEDURE — 3075F PR MOST RECENT SYSTOLIC BLOOD PRESS GE 130-139MM HG: ICD-10-PCS | Mod: HCNC,CPTII,S$GLB, | Performed by: NURSE PRACTITIONER

## 2019-03-12 PROCEDURE — 92014 COMPRE OPH EXAM EST PT 1/>: CPT | Mod: HCNC,S$GLB,, | Performed by: OPTOMETRIST

## 2019-03-12 PROCEDURE — 99999 PR PBB SHADOW E&M-EST. PATIENT-LVL I: CPT | Mod: PBBFAC,HCNC,, | Performed by: OPTOMETRIST

## 2019-03-12 PROCEDURE — 92015 DETERMINE REFRACTIVE STATE: CPT | Mod: HCNC,S$GLB,, | Performed by: OPTOMETRIST

## 2019-03-12 PROCEDURE — 99499 RISK ADDL DX/OHS AUDIT: ICD-10-PCS | Mod: HCNC,S$GLB,, | Performed by: OPTOMETRIST

## 2019-03-12 PROCEDURE — 99213 PR OFFICE/OUTPT VISIT, EST, LEVL III, 20-29 MIN: ICD-10-PCS | Mod: HCNC,S$GLB,, | Performed by: NURSE PRACTITIONER

## 2019-03-12 PROCEDURE — 99999 PR PBB SHADOW E&M-EST. PATIENT-LVL IV: ICD-10-PCS | Mod: PBBFAC,HCNC,, | Performed by: NURSE PRACTITIONER

## 2019-03-12 PROCEDURE — 1101F PR PT FALLS ASSESS DOC 0-1 FALLS W/OUT INJ PAST YR: ICD-10-PCS | Mod: HCNC,CPTII,S$GLB, | Performed by: NURSE PRACTITIONER

## 2019-03-12 PROCEDURE — 3078F DIAST BP <80 MM HG: CPT | Mod: HCNC,CPTII,S$GLB, | Performed by: NURSE PRACTITIONER

## 2019-03-12 PROCEDURE — 92014 PR EYE EXAM, EST PATIENT,COMPREHESV: ICD-10-PCS | Mod: HCNC,S$GLB,, | Performed by: OPTOMETRIST

## 2019-03-12 PROCEDURE — 3078F PR MOST RECENT DIASTOLIC BLOOD PRESSURE < 80 MM HG: ICD-10-PCS | Mod: HCNC,CPTII,S$GLB, | Performed by: NURSE PRACTITIONER

## 2019-03-12 PROCEDURE — 87880 POCT RAPID STREP A: ICD-10-PCS | Mod: QW,HCNC,S$GLB, | Performed by: NURSE PRACTITIONER

## 2019-03-12 PROCEDURE — 99213 OFFICE O/P EST LOW 20 MIN: CPT | Mod: HCNC,S$GLB,, | Performed by: NURSE PRACTITIONER

## 2019-03-12 PROCEDURE — 99999 PR PBB SHADOW E&M-EST. PATIENT-LVL IV: CPT | Mod: PBBFAC,HCNC,, | Performed by: NURSE PRACTITIONER

## 2019-03-12 PROCEDURE — 92015 PR REFRACTION: ICD-10-PCS | Mod: HCNC,S$GLB,, | Performed by: OPTOMETRIST

## 2019-03-12 PROCEDURE — 3075F SYST BP GE 130 - 139MM HG: CPT | Mod: HCNC,CPTII,S$GLB, | Performed by: NURSE PRACTITIONER

## 2019-03-12 PROCEDURE — 99499 UNLISTED E&M SERVICE: CPT | Mod: HCNC,S$GLB,, | Performed by: OPTOMETRIST

## 2019-03-12 PROCEDURE — 99999 PR PBB SHADOW E&M-EST. PATIENT-LVL I: ICD-10-PCS | Mod: PBBFAC,HCNC,, | Performed by: OPTOMETRIST

## 2019-03-12 PROCEDURE — 1101F PT FALLS ASSESS-DOCD LE1/YR: CPT | Mod: HCNC,CPTII,S$GLB, | Performed by: NURSE PRACTITIONER

## 2019-03-12 PROCEDURE — 87880 STREP A ASSAY W/OPTIC: CPT | Mod: QW,HCNC,S$GLB, | Performed by: NURSE PRACTITIONER

## 2019-03-12 RX ORDER — FLUTICASONE PROPIONATE 50 MCG
2 SPRAY, SUSPENSION (ML) NASAL DAILY
Qty: 16 G | Refills: 0 | Status: SHIPPED | OUTPATIENT
Start: 2019-03-12 | End: 2019-03-12 | Stop reason: SDUPTHER

## 2019-03-12 RX ORDER — SACUBITRIL AND VALSARTAN 24; 26 MG/1; MG/1
TABLET, FILM COATED ORAL
COMMUNITY
Start: 2019-01-10 | End: 2020-11-03

## 2019-03-12 RX ORDER — LEVOCETIRIZINE DIHYDROCHLORIDE 5 MG/1
5 TABLET, FILM COATED ORAL NIGHTLY
Qty: 30 TABLET | Refills: 0 | Status: SHIPPED | OUTPATIENT
Start: 2019-03-12 | End: 2019-03-13 | Stop reason: SDUPTHER

## 2019-03-12 NOTE — PROGRESS NOTES
HPI     NIDDM exam.  Decrease near visual acuity sometimes.  Last eye exam 02/27/2018 TRF  Patient wears OTC readers.     Last edited by Meagan Walker on 3/12/2019  1:39 PM. (History)            Assessment /Plan     For exam results, see Encounter Report.    Diabetes mellitus type 2 without retinopathy    Cataract, nuclear sclerotic senile, bilateral    Hyperopia, bilateral    Bilateral presbyopia      No Background Diabetic Retinopathy    Moderate cataracts OU, not surgical    Dispense Final Rx for glasses.  RTC 1 year  Discussed above and answered questions.

## 2019-03-12 NOTE — PROGRESS NOTES
CC:   Chief Complaint   Patient presents with    Nasal Congestion     sneezing    Sore Throat     HPI: This is a new problem.   Jake Ortiz is a 78 y.o. male with a complaint of URI.  The current episode started in the past 4 days.   The problem has been gradually worsening.   Associated symptoms included nasal congestion, rhinorrhea, sore throat, sneezing    Pertinent negatives include fever, chills, dyspnea, wheezing   Treatments tried: otc antihistamine has been used and this has provided no relief.     [unfilled]  Outpatient Medications Prior to Visit   Medication Sig Dispense Refill    acetaminophen (TYLENOL) 500 MG tablet Take 500 mg by mouth every 6 (six) hours as needed for Pain.      aspirin (ECOTRIN) 81 MG EC tablet Take 81 mg by mouth.      atorvastatin (LIPITOR) 80 MG tablet TK 1 T PO D  3    blood sugar diagnostic Strp Check blood glucose levels daily in the AM fasting and 1-2 times more daily. 100 strip 11    cholecalciferol, vitamin D3, (VITAMIN D3) 1,000 unit capsule Take 5,000 Units by mouth once daily.      clonazePAM (KLONOPIN) 1 MG tablet TK 1 T PO HS  5    clopidogrel (PLAVIX) 75 mg tablet Take 75 mg by mouth once daily.      ENTRESTO 24-26 mg per tablet       furosemide (LASIX) 20 MG tablet Take 1 tablet (20 mg total) by mouth 2 (two) times daily. 60 tablet 5    lancets Misc Check blood glucose levels daily in the AM fasting and 1-2 times more daily. 100 each 11    multivitamin capsule Take 1 capsule by mouth once daily.      nitroGLYCERIN (NITROSTAT) 0.3 MG SL tablet PLACE 1 TABLET UNDER THE TONGUE EVERY 5 MINUTES AS NEEDED FOR CHEST PAIN AS DIRECTED  0    spironolactone (ALDACTONE) 50 MG tablet TK 1 T PO D  6    blood-glucose meter kit Use as instructed 1 each 0    Lactobacillus rhamnosus GG (CULTURELLE) 10 billion cell capsule Take 1 capsule by mouth once daily.      sacubitril/valsartan (ENTRESTO ORAL) Take by mouth.      simethicone (GAS-X ORAL) Take by mouth.        No facility-administered medications prior to visit.         Physical Exam   /76   Pulse 74   Temp 96.8 °F (36 °C) (Tympanic)   Wt 83.1 kg (183 lb 3.2 oz)   SpO2 99%   BMI 27.05 kg/m²   Constitutional: The patient appears well-developed and well-nourished.   Head: Normocephalic and atraumatic.   Right Ear: Tympanic membrane and ear canal normal. No drainage, swelling or tenderness. Tympanic membrane is not injected, not erythematous and not bulging.   Left Ear: Ear canal normal. No drainage, swelling or tenderness. Tympanic membrane is not injected, not erythematous and not bulging.   Nose: Mucosal edema and rhinorrhea present. No sinus tenderness on palpation  Mouth/Throat: Uvula is midline. Posterior oropharyngeal erythema present. No oropharyngeal exudate. cobblestone       THE MUCOSA IS BOGGY AND ERYTHEMATOUS.     Eyes: Conjunctivae normal and lids are normal. Pupils are equal, round, and reactive to light. Right eye exhibits no discharge. Left eye exhibits no discharge. Right eye exhibits normal extraocular motion. Left eye exhibits normal extraocular motion.   Neck: Trachea normal and normal range of motion. Neck supple. No tracheal tenderness present. No mass and no thyromegaly present.   Cardiovascular: Normal rate, regular rhythm, S1 normal, S2 normal and normal heart sounds.  Exam reveals no gallop, no S3, no S4 and no friction rub.    No murmur heard.  Pulmonary/Chest: Effort normal and breath sounds normal. No stridor. Not tachypneic. No respiratory distress. The patient has no wheezes. The patient has no rhonchi. The patient has no rales.   Skin: The patient is not diaphoretic.     Encounter Diagnosis   Name Primary?    Allergic rhinitis, unspecified seasonality, unspecified trigger Yes       PLAN:    Jake was seen today for nasal congestion and sore throat.    Diagnoses and all orders for this visit:    Allergic rhinitis, unspecified seasonality, unspecified trigger  -     fluticasone  (FLONASE) 50 mcg/actuation nasal spray; 2 sprays (100 mcg total) by Each Nare route once daily.  -     levocetirizine (XYZAL) 5 MG tablet; Take 1 tablet (5 mg total) by mouth every evening.  -     POCT Rapid Strep A  Symptomatic treatment discussed. Verbalized understanding     Medications Ordered This Encounter   Medications    fluticasone (FLONASE) 50 mcg/actuation nasal spray     Si sprays (100 mcg total) by Each Nare route once daily.     Dispense:  16 g     Refill:  0    levocetirizine (XYZAL) 5 MG tablet     Sig: Take 1 tablet (5 mg total) by mouth every evening.     Dispense:  30 tablet     Refill:  0     Orders Placed This Encounter   Procedures    POCT Rapid Strep A     RTC if symptoms are worsening or changing significantly or if not improved by the end of therapy.

## 2019-03-14 RX ORDER — LEVOCETIRIZINE DIHYDROCHLORIDE 5 MG/1
5 TABLET, FILM COATED ORAL NIGHTLY
Qty: 90 TABLET | Refills: 0 | Status: SHIPPED | OUTPATIENT
Start: 2019-03-14 | End: 2019-09-25 | Stop reason: SDUPTHER

## 2019-03-14 RX ORDER — FLUTICASONE PROPIONATE 50 MCG
2 SPRAY, SUSPENSION (ML) NASAL DAILY
Qty: 48 G | Refills: 0 | Status: SHIPPED | OUTPATIENT
Start: 2019-03-14 | End: 2020-05-20 | Stop reason: SDUPTHER

## 2019-03-20 ENCOUNTER — HOSPITAL ENCOUNTER (OUTPATIENT)
Dept: RADIOLOGY | Facility: HOSPITAL | Age: 79
Discharge: HOME | End: 2019-03-20
Attending: FAMILY MEDICINE
Payer: MEDICARE

## 2019-03-20 ENCOUNTER — OFFICE VISIT (OUTPATIENT)
Dept: FAMILY MEDICINE | Facility: CLINIC | Age: 79
End: 2019-03-20
Payer: MEDICARE

## 2019-03-20 VITALS
HEART RATE: 84 BPM | DIASTOLIC BLOOD PRESSURE: 66 MMHG | SYSTOLIC BLOOD PRESSURE: 114 MMHG | BODY MASS INDEX: 27.93 KG/M2 | TEMPERATURE: 97 F | WEIGHT: 184.31 LBS | OXYGEN SATURATION: 98 % | HEIGHT: 68 IN

## 2019-03-20 DIAGNOSIS — J98.4 CHRONIC RESTRICTIVE LUNG DISEASE: ICD-10-CM

## 2019-03-20 DIAGNOSIS — R19.7 DIARRHEA, UNSPECIFIED TYPE: ICD-10-CM

## 2019-03-20 DIAGNOSIS — G47.33 OBSTRUCTIVE SLEEP APNEA: ICD-10-CM

## 2019-03-20 DIAGNOSIS — J20.9 ACUTE BRONCHITIS, UNSPECIFIED ORGANISM: ICD-10-CM

## 2019-03-20 DIAGNOSIS — R05.9 COUGH: ICD-10-CM

## 2019-03-20 DIAGNOSIS — J01.00 ACUTE MAXILLARY SINUSITIS, RECURRENCE NOT SPECIFIED: Primary | ICD-10-CM

## 2019-03-20 DIAGNOSIS — D50.0 IRON DEFICIENCY ANEMIA DUE TO CHRONIC BLOOD LOSS: ICD-10-CM

## 2019-03-20 DIAGNOSIS — I48.0 PAROXYSMAL ATRIAL FIBRILLATION: ICD-10-CM

## 2019-03-20 DIAGNOSIS — I10 ESSENTIAL HYPERTENSION: ICD-10-CM

## 2019-03-20 DIAGNOSIS — J43.9 MIXED RESTRICTIVE AND OBSTRUCTIVE LUNG DISEASE: ICD-10-CM

## 2019-03-20 DIAGNOSIS — Z95.0 PACEMAKER: ICD-10-CM

## 2019-03-20 DIAGNOSIS — J98.4 MIXED RESTRICTIVE AND OBSTRUCTIVE LUNG DISEASE: ICD-10-CM

## 2019-03-20 DIAGNOSIS — C61 ADENOCARCINOMA OF PROSTATE: ICD-10-CM

## 2019-03-20 DIAGNOSIS — I50.9 CONGESTIVE HEART FAILURE, UNSPECIFIED HF CHRONICITY, UNSPECIFIED HEART FAILURE TYPE: ICD-10-CM

## 2019-03-20 DIAGNOSIS — I70.0 ARTERIOSCLEROSIS OF AORTA: ICD-10-CM

## 2019-03-20 DIAGNOSIS — R18.8 OTHER ASCITES: ICD-10-CM

## 2019-03-20 DIAGNOSIS — D71 GRANULOMATOUS DISEASE: ICD-10-CM

## 2019-03-20 DIAGNOSIS — E11.9 DIABETES MELLITUS TYPE 2 IN NONOBESE: ICD-10-CM

## 2019-03-20 DIAGNOSIS — Z79.01 CURRENT USE OF ANTICOAGULANT THERAPY: ICD-10-CM

## 2019-03-20 LAB
ALBUMIN/CREAT UR: 16.1 UG/MG
CREAT UR-MCNC: 31 MG/DL
MICROALBUMIN UR DL<=1MG/L-MCNC: 5 UG/ML

## 2019-03-20 PROCEDURE — 1101F PR PT FALLS ASSESS DOC 0-1 FALLS W/OUT INJ PAST YR: ICD-10-PCS | Mod: HCNC,CPTII,S$GLB, | Performed by: FAMILY MEDICINE

## 2019-03-20 PROCEDURE — 99999 PR PBB SHADOW E&M-EST. PATIENT-LVL V: CPT | Mod: PBBFAC,HCNC,, | Performed by: FAMILY MEDICINE

## 2019-03-20 PROCEDURE — 99999 PR PBB SHADOW E&M-EST. PATIENT-LVL V: ICD-10-PCS | Mod: PBBFAC,HCNC,, | Performed by: FAMILY MEDICINE

## 2019-03-20 PROCEDURE — 71046 XR CHEST PA AND LATERAL: ICD-10-PCS | Mod: 26,HCNC,, | Performed by: RADIOLOGY

## 2019-03-20 PROCEDURE — 99499 RISK ADDL DX/OHS AUDIT: ICD-10-PCS | Mod: HCNC,S$GLB,, | Performed by: FAMILY MEDICINE

## 2019-03-20 PROCEDURE — 3078F PR MOST RECENT DIASTOLIC BLOOD PRESSURE < 80 MM HG: ICD-10-PCS | Mod: HCNC,CPTII,S$GLB, | Performed by: FAMILY MEDICINE

## 2019-03-20 PROCEDURE — 71046 X-RAY EXAM CHEST 2 VIEWS: CPT | Mod: 26,HCNC,, | Performed by: RADIOLOGY

## 2019-03-20 PROCEDURE — 99214 OFFICE O/P EST MOD 30 MIN: CPT | Mod: 25,HCNC,S$GLB, | Performed by: FAMILY MEDICINE

## 2019-03-20 PROCEDURE — 3078F DIAST BP <80 MM HG: CPT | Mod: HCNC,CPTII,S$GLB, | Performed by: FAMILY MEDICINE

## 2019-03-20 PROCEDURE — 94640 AIRWAY INHALATION TREATMENT: CPT | Mod: HCNC,S$GLB,, | Performed by: FAMILY MEDICINE

## 2019-03-20 PROCEDURE — 94640 PR INHAL RX, AIRWAY OBST/DX SPUTUM INDUCT: ICD-10-PCS | Mod: HCNC,S$GLB,, | Performed by: FAMILY MEDICINE

## 2019-03-20 PROCEDURE — 3074F PR MOST RECENT SYSTOLIC BLOOD PRESSURE < 130 MM HG: ICD-10-PCS | Mod: HCNC,CPTII,S$GLB, | Performed by: FAMILY MEDICINE

## 2019-03-20 PROCEDURE — 71046 X-RAY EXAM CHEST 2 VIEWS: CPT | Mod: TC,HCNC,PO

## 2019-03-20 PROCEDURE — 99214 PR OFFICE/OUTPT VISIT, EST, LEVL IV, 30-39 MIN: ICD-10-PCS | Mod: 25,HCNC,S$GLB, | Performed by: FAMILY MEDICINE

## 2019-03-20 PROCEDURE — 99499 UNLISTED E&M SERVICE: CPT | Mod: HCNC,S$GLB,, | Performed by: FAMILY MEDICINE

## 2019-03-20 PROCEDURE — 82043 UR ALBUMIN QUANTITATIVE: CPT | Mod: HCNC

## 2019-03-20 PROCEDURE — 3074F SYST BP LT 130 MM HG: CPT | Mod: HCNC,CPTII,S$GLB, | Performed by: FAMILY MEDICINE

## 2019-03-20 PROCEDURE — 1101F PT FALLS ASSESS-DOCD LE1/YR: CPT | Mod: HCNC,CPTII,S$GLB, | Performed by: FAMILY MEDICINE

## 2019-03-20 RX ORDER — PREDNISONE 20 MG/1
TABLET ORAL
Qty: 10 TABLET | Refills: 0 | Status: SHIPPED | OUTPATIENT
Start: 2019-03-20 | End: 2019-09-25

## 2019-03-20 RX ORDER — ALBUTEROL SULFATE 0.83 MG/ML
2.5 SOLUTION RESPIRATORY (INHALATION)
Status: COMPLETED | OUTPATIENT
Start: 2019-03-20 | End: 2019-03-20

## 2019-03-20 RX ORDER — AMOXICILLIN 500 MG
1 CAPSULE ORAL DAILY
COMMUNITY
End: 2021-02-05

## 2019-03-20 RX ORDER — DOXYCYCLINE 100 MG/1
100 CAPSULE ORAL EVERY 12 HOURS
Qty: 20 CAPSULE | Refills: 0 | Status: SHIPPED | OUTPATIENT
Start: 2019-03-20 | End: 2020-01-31 | Stop reason: SDUPTHER

## 2019-03-20 RX ADMIN — ALBUTEROL SULFATE 2.5 MG: 0.83 SOLUTION RESPIRATORY (INHALATION) at 09:03

## 2019-03-20 NOTE — PROGRESS NOTES
"Subjective:       Patient ID: Jake Ortiz is a 78 y.o. male.    Chief Complaint: Cough and Diarrhea    Cough   This is a recurrent problem. The current episode started 1 to 4 weeks ago. The problem has been waxing and waning. The problem occurs every few minutes. The cough is productive of sputum. Associated symptoms include nasal congestion, postnasal drip, rhinorrhea and wheezing. Pertinent negatives include no chest pain, chills, ear congestion, ear pain, fever, headaches, heartburn, hemoptysis, myalgias, rash, sore throat, shortness of breath, sweats or weight loss. He has tried OTC cough suppressant, prescription cough suppressant and rest for the symptoms. The treatment provided mild relief. His past medical history is significant for pneumonia.   Diarrhea    Associated symptoms include coughing. Pertinent negatives include no abdominal pain, chills, fever, headaches, myalgias, sweats, vomiting or weight loss.   Patient reports taking 3 doses of Avelox remaining from an old RX within the past few days. He reports that he has been taking Flonase and Xyzal prescribed by NEGIN Alvarenga at a visit on 3/12/19. He has been doubling up on Xyzal to help "dry me out". He notes having significant post nasal drainage and copious rhinorrhea. He reports Tmax of 97.6. Reports glucose levels have been stable- last . No symptoms of hyper or hypoglycemia are reported. Admits he and his spouse have been having ice cream and cake with supper. A1c was last checked in November 2018 at 5.6. Patient has previously been evaluated by multiple specialists including Pulmonology, GI and Hematology but has largely been lost to follow-up.     Review of Systems   Constitutional: Negative for chills, fever and weight loss.   HENT: Positive for congestion, postnasal drip and rhinorrhea. Negative for ear pain and sore throat.    Eyes: Negative for visual disturbance.   Respiratory: Positive for cough and wheezing. Negative for hemoptysis and " "shortness of breath.    Cardiovascular: Negative for chest pain.   Gastrointestinal: Positive for diarrhea. Negative for abdominal pain, constipation, heartburn, nausea and vomiting.   Endocrine: Negative for polydipsia and polyuria.   Genitourinary: Negative for decreased urine volume and difficulty urinating.   Musculoskeletal: Positive for back pain. Negative for myalgias.   Skin: Negative for rash.   Neurological: Negative for dizziness, weakness and headaches.   Psychiatric/Behavioral: Negative for sleep disturbance. The patient is not nervous/anxious.        Objective:   /66   Pulse 84   Temp 96.5 °F (35.8 °C)   Ht 5' 8" (1.727 m)   Wt 83.6 kg (184 lb 4.9 oz)   SpO2 98%   BMI 28.02 kg/m²   Physical Exam   Constitutional: He is oriented to person, place, and time. He appears well-developed and well-nourished. No distress.   HENT:   Head: Normocephalic and atraumatic.   Right Ear: Tympanic membrane, external ear and ear canal normal.   Left Ear: Tympanic membrane, external ear and ear canal normal.   Nose: Mucosal edema and rhinorrhea present.   Mouth/Throat: Oropharynx is clear and moist.   Eyes: Conjunctivae and EOM are normal. Pupils are equal, round, and reactive to light.   Neck: Normal range of motion. Neck supple.   Cardiovascular: Normal rate, regular rhythm and normal heart sounds.   Pulmonary/Chest: Effort normal. He has wheezes.   Abdominal: Soft. Bowel sounds are normal.   Musculoskeletal: He exhibits no edema.   Neurological: He is alert and oriented to person, place, and time.   Skin: Skin is warm and dry. He is not diaphoretic.   Psychiatric: He has a normal mood and affect. His behavior is normal.       Assessment:       1. Acute maxillary sinusitis, recurrence not specified    2. Acute bronchitis, unspecified organism    3. Chronic restrictive lung disease    4. Mixed restrictive and obstructive lung disease    5. Cough    6. Diarrhea, unspecified type    7. Diabetes mellitus type 2 " in nonobese    8. Essential hypertension    9. Paroxysmal atrial fibrillation    10. Congestive heart failure, unspecified HF chronicity, unspecified heart failure type    11. Other ascites    12. Iron deficiency anemia due to chronic blood loss    13. Granulomatous disease    14. Obstructive sleep apnea    15. Adenocarcinoma of prostate    16. Arteriosclerosis of aorta    17. Current use of anticoagulant therapy    18. Pacemaker        Plan:      Acute maxillary sinusitis, recurrence not specified  -     doxycycline (VIBRAMYCIN) 100 MG Cap; Take 1 capsule (100 mg total) by mouth every 12 (twelve) hours.  Dispense: 20 capsule; Refill: 0  -     predniSONE (DELTASONE) 20 MG tablet; Take 2 tabs po daily for 3 days then 1 tab po daily for 4 days  Dispense: 10 tablet; Refill: 0    Acute bronchitis, unspecified organism  -     predniSONE (DELTASONE) 20 MG tablet; Take 2 tabs po daily for 3 days then 1 tab po daily for 4 days  Dispense: 10 tablet; Refill: 0    Chronic restrictive lung disease  -     predniSONE (DELTASONE) 20 MG tablet; Take 2 tabs po daily for 3 days then 1 tab po daily for 4 days  Dispense: 10 tablet; Refill: 0    Mixed restrictive and obstructive lung disease  -     predniSONE (DELTASONE) 20 MG tablet; Take 2 tabs po daily for 3 days then 1 tab po daily for 4 days  Dispense: 10 tablet; Refill: 0    Cough  Albuterol nebs provided in office today with favorable response. Proceed as above.  -     albuterol nebulizer solution 2.5 mg  -     X-Ray Chest PA And Lateral; Future; Expected date: 03/20/2019    Diarrhea, unspecified type  Possibly secondary to increased mucus burden. Recommend continued monitoring. Cautious use of antidiarrheal medication.    Diabetes mellitus type 2 in nonobese  Monitor for hyperglycemia with Prednisone use as above. Proceed with lab update and short interval follow-up.  -     Hemoglobin A1c; Future; Expected date: 03/20/2019  -     Microalbumin/creatinine urine ratio  -      Comprehensive metabolic panel; Future; Expected date: 03/20/2019    Essential hypertension  Stable. Continue with current treatment. Target BP goal remains<140/90. Heart healthy diet is advised. Intermittent home monitoring has been discussed.    Paroxysmal atrial fibrillation  Rate controlled. Continue with current measures as per Cardiology.    Congestive heart failure, unspecified HF chronicity, unspecified heart failure type  As above.    Other ascites  As per GI.    Iron deficiency anemia due to chronic blood loss  As per Hematology.    Granulomatous disease  Asymptomatic.    Obstructive sleep apnea  As per Pulmonology.    Adenocarcinoma of prostate  As per Urology.    Arteriosclerosis of aorta  BP and lipid control are advised.    Pacemaker  As per Cardiology.

## 2019-03-27 ENCOUNTER — OFFICE VISIT (OUTPATIENT)
Dept: FAMILY MEDICINE | Facility: CLINIC | Age: 79
End: 2019-03-27
Payer: MEDICARE

## 2019-03-27 VITALS
HEIGHT: 68 IN | OXYGEN SATURATION: 96 % | TEMPERATURE: 97 F | RESPIRATION RATE: 16 BRPM | SYSTOLIC BLOOD PRESSURE: 120 MMHG | BODY MASS INDEX: 28.28 KG/M2 | WEIGHT: 186.63 LBS | DIASTOLIC BLOOD PRESSURE: 62 MMHG | HEART RATE: 76 BPM

## 2019-03-27 DIAGNOSIS — J98.4 CHRONIC RESTRICTIVE LUNG DISEASE: ICD-10-CM

## 2019-03-27 DIAGNOSIS — G89.29 CHRONIC LOW BACK PAIN WITHOUT SCIATICA, UNSPECIFIED BACK PAIN LATERALITY: ICD-10-CM

## 2019-03-27 DIAGNOSIS — J98.4 MIXED RESTRICTIVE AND OBSTRUCTIVE LUNG DISEASE: ICD-10-CM

## 2019-03-27 DIAGNOSIS — D71 GRANULOMATOUS DISEASE: ICD-10-CM

## 2019-03-27 DIAGNOSIS — R18.8 OTHER ASCITES: ICD-10-CM

## 2019-03-27 DIAGNOSIS — B37.0 THRUSH, ORAL: ICD-10-CM

## 2019-03-27 DIAGNOSIS — M54.50 CHRONIC LOW BACK PAIN WITHOUT SCIATICA, UNSPECIFIED BACK PAIN LATERALITY: ICD-10-CM

## 2019-03-27 DIAGNOSIS — J20.9 ACUTE BRONCHITIS, UNSPECIFIED ORGANISM: ICD-10-CM

## 2019-03-27 DIAGNOSIS — Z95.0 PACEMAKER: ICD-10-CM

## 2019-03-27 DIAGNOSIS — C61 ADENOCARCINOMA OF PROSTATE: ICD-10-CM

## 2019-03-27 DIAGNOSIS — J01.00 ACUTE MAXILLARY SINUSITIS, RECURRENCE NOT SPECIFIED: ICD-10-CM

## 2019-03-27 DIAGNOSIS — D50.0 IRON DEFICIENCY ANEMIA DUE TO CHRONIC BLOOD LOSS: ICD-10-CM

## 2019-03-27 DIAGNOSIS — J43.9 MIXED RESTRICTIVE AND OBSTRUCTIVE LUNG DISEASE: ICD-10-CM

## 2019-03-27 DIAGNOSIS — E11.9 DIABETES MELLITUS TYPE 2 IN NONOBESE: Primary | ICD-10-CM

## 2019-03-27 DIAGNOSIS — I70.0 ARTERIOSCLEROSIS OF AORTA: ICD-10-CM

## 2019-03-27 DIAGNOSIS — I50.9 CONGESTIVE HEART FAILURE, UNSPECIFIED HF CHRONICITY, UNSPECIFIED HEART FAILURE TYPE: ICD-10-CM

## 2019-03-27 DIAGNOSIS — I48.0 PAROXYSMAL ATRIAL FIBRILLATION: ICD-10-CM

## 2019-03-27 DIAGNOSIS — I10 ESSENTIAL HYPERTENSION: ICD-10-CM

## 2019-03-27 PROCEDURE — 3078F PR MOST RECENT DIASTOLIC BLOOD PRESSURE < 80 MM HG: ICD-10-PCS | Mod: HCNC,CPTII,S$GLB, | Performed by: FAMILY MEDICINE

## 2019-03-27 PROCEDURE — 1101F PR PT FALLS ASSESS DOC 0-1 FALLS W/OUT INJ PAST YR: ICD-10-PCS | Mod: HCNC,CPTII,S$GLB, | Performed by: FAMILY MEDICINE

## 2019-03-27 PROCEDURE — 99499 RISK ADDL DX/OHS AUDIT: ICD-10-PCS | Mod: HCNC,S$GLB,, | Performed by: FAMILY MEDICINE

## 2019-03-27 PROCEDURE — 99214 PR OFFICE/OUTPT VISIT, EST, LEVL IV, 30-39 MIN: ICD-10-PCS | Mod: HCNC,S$GLB,, | Performed by: FAMILY MEDICINE

## 2019-03-27 PROCEDURE — 99214 OFFICE O/P EST MOD 30 MIN: CPT | Mod: HCNC,S$GLB,, | Performed by: FAMILY MEDICINE

## 2019-03-27 PROCEDURE — 99999 PR PBB SHADOW E&M-EST. PATIENT-LVL III: ICD-10-PCS | Mod: PBBFAC,HCNC,, | Performed by: FAMILY MEDICINE

## 2019-03-27 PROCEDURE — 3074F PR MOST RECENT SYSTOLIC BLOOD PRESSURE < 130 MM HG: ICD-10-PCS | Mod: HCNC,CPTII,S$GLB, | Performed by: FAMILY MEDICINE

## 2019-03-27 PROCEDURE — 1101F PT FALLS ASSESS-DOCD LE1/YR: CPT | Mod: HCNC,CPTII,S$GLB, | Performed by: FAMILY MEDICINE

## 2019-03-27 PROCEDURE — 99999 PR PBB SHADOW E&M-EST. PATIENT-LVL III: CPT | Mod: PBBFAC,HCNC,, | Performed by: FAMILY MEDICINE

## 2019-03-27 PROCEDURE — 3078F DIAST BP <80 MM HG: CPT | Mod: HCNC,CPTII,S$GLB, | Performed by: FAMILY MEDICINE

## 2019-03-27 PROCEDURE — 99499 UNLISTED E&M SERVICE: CPT | Mod: HCNC,S$GLB,, | Performed by: FAMILY MEDICINE

## 2019-03-27 PROCEDURE — 3074F SYST BP LT 130 MM HG: CPT | Mod: HCNC,CPTII,S$GLB, | Performed by: FAMILY MEDICINE

## 2019-03-27 RX ORDER — IPRATROPIUM BROMIDE AND ALBUTEROL SULFATE 2.5; .5 MG/3ML; MG/3ML
3 SOLUTION RESPIRATORY (INHALATION) EVERY 6 HOURS PRN
Qty: 3 BOX | Refills: 0 | Status: SHIPPED | OUTPATIENT
Start: 2019-03-27 | End: 2019-03-27 | Stop reason: SDUPTHER

## 2019-03-27 RX ORDER — NYSTATIN 100000 [USP'U]/ML
4 SUSPENSION ORAL 4 TIMES DAILY
Qty: 160 ML | Refills: 0 | Status: SHIPPED | OUTPATIENT
Start: 2019-03-27 | End: 2019-04-06

## 2019-03-27 RX ORDER — IPRATROPIUM BROMIDE AND ALBUTEROL SULFATE 2.5; .5 MG/3ML; MG/3ML
SOLUTION RESPIRATORY (INHALATION)
Qty: 990 ML | Refills: 0 | Status: SHIPPED | OUTPATIENT
Start: 2019-03-27 | End: 2020-01-31 | Stop reason: SDUPTHER

## 2019-03-27 NOTE — PROGRESS NOTES
Subjective:       Patient ID: Jake Ortiz is a 78 y.o. male.    Chief Complaint: Follow-up    HPI   Follow-up  77yo male presents today for follow-up. He has had recent lab update and is here for review of results. Of note, he was also seen last week for sinusitis and bronchitis. Reports that cough has been improving but varies at night. He has been taking a combination of Prednisone and Doxycycline. Notes use of Duoneb which has afforded benefit. He denies any shortness of breath or wheezing and has been afebrile. Has not been evaluated by Pulmonology since June 2018. Reports that once a diagnosis of anemia was made he did not feel that further pulmonary assessment was necessary. He notes taking Flonase and Xyzal for AR symptoms- these afford benefit. He has been experiencing low back pain and has been going through a series of injections per Dr. Huertas. Further assessment is planned with [facility]. A1c has increased- now at 6.0. He admits to dietary indiscretion. Fasting glucose levels have been stable from 105-130. No symptoms of hyper or hypoglycemia are reported. Diabetic foot exam is due- eye exam is currently up to date. BP is stable. He maintains compliance with statin use.     Review of Systems   Constitutional: Negative for activity change and unexpected weight change.   HENT: Negative for hearing loss, rhinorrhea and trouble swallowing.    Eyes: Negative for discharge and visual disturbance.   Respiratory: Negative for chest tightness and wheezing.    Cardiovascular: Negative for chest pain and palpitations.   Gastrointestinal: Negative for blood in stool, constipation, diarrhea and vomiting.   Endocrine: Negative for polydipsia and polyuria.   Genitourinary: Negative for difficulty urinating, hematuria and urgency.   Musculoskeletal: Negative for joint swelling and neck pain.   Neurological: Negative for weakness and headaches.   Psychiatric/Behavioral: Negative for confusion and dysphoric mood.     "   Objective:   /62   Pulse 76   Temp 97.2 °F (36.2 °C) (Temporal)   Resp 16   Ht 5' 8" (1.727 m)   Wt 84.6 kg (186 lb 9.9 oz)   SpO2 96%   BMI 28.38 kg/m²   Physical Exam   Constitutional: He is oriented to person, place, and time. He appears well-developed and well-nourished. No distress.   HENT:   Head: Normocephalic and atraumatic.   Right Ear: Tympanic membrane, external ear and ear canal normal.   Left Ear: Tympanic membrane, external ear and ear canal normal.   Nose: Nose normal.   Mouth/Throat: Oropharynx is clear and moist.   Thrush along the tongue and palate   Eyes: Pupils are equal, round, and reactive to light. Conjunctivae and EOM are normal.   Neck: Normal range of motion. Neck supple.   Cardiovascular: Normal rate, regular rhythm and normal heart sounds.   Pulmonary/Chest: Effort normal and breath sounds normal.   Abdominal: Soft. Bowel sounds are normal.   Musculoskeletal: He exhibits no edema.   Feet:   Right Foot:   Protective Sensation: 10 sites tested. 10 sites sensed.   Skin Integrity: Negative for callus or dry skin.   Left Foot:   Protective Sensation: 10 sites tested. 10 sites sensed.   Skin Integrity: Negative for callus or dry skin.   Neurological: He is alert and oriented to person, place, and time.   Skin: Skin is warm and dry. No rash noted. He is not diaphoretic.   Psychiatric: He has a normal mood and affect. His behavior is normal.       Assessment:       1. Diabetes mellitus type 2 in nonobese    2. Essential hypertension    3. Acute maxillary sinusitis, recurrence not specified    4. Acute bronchitis, unspecified organism    5. Chronic restrictive lung disease    6. Mixed restrictive and obstructive lung disease    7. Paroxysmal atrial fibrillation    8. Congestive heart failure, unspecified HF chronicity, unspecified heart failure type    9. Other ascites    10. Iron deficiency anemia due to chronic blood loss    11. Granulomatous disease    12. Thrush, oral    13. " Chronic low back pain without sciatica, unspecified back pain laterality    14. Adenocarcinoma of prostate    15. Arteriosclerosis of aorta    16. Pacemaker        Plan:      Diabetes mellitus type 2 in nonobese  Stable. Continue with current treatment- diet controlled. Target A1c remains <7.0. AM fasting glucose goals have been reviewed (). Recommend routine annual diabetic eye and foot examination. Will plan to reassess in 6 months with labs prior to the visit.         -     Hemoglobin A1c; Future; Expected date: 03/27/2019  -     Lipid panel; Future; Expected date: 03/27/2019    Essential hypertension  Stable. Continue with current treatment. Target BP goal remains<140/90. Heart healthy diet is advised. Intermittent home monitoring has been discussed.  -     Comprehensive metabolic panel; Future; Expected date: 03/27/2019    Acute maxillary sinusitis, recurrence not specified  Improved. Completion of ABX is advised.    Acute bronchitis, unspecified organism  -     albuterol-ipratropium (DUO-NEB) 2.5 mg-0.5 mg/3 mL nebulizer solution; Take 3 mLs by nebulization every 6 (six) hours as needed for Wheezing. Rescue  Dispense: 3 Box; Refill: 0    Chronic restrictive lung disease  -     albuterol-ipratropium (DUO-NEB) 2.5 mg-0.5 mg/3 mL nebulizer solution; Take 3 mLs by nebulization every 6 (six) hours as needed for Wheezing. Rescue  Dispense: 3 Box; Refill: 0    Mixed restrictive and obstructive lung disease  -     albuterol-ipratropium (DUO-NEB) 2.5 mg-0.5 mg/3 mL nebulizer solution; Take 3 mLs by nebulization every 6 (six) hours as needed for Wheezing. Rescue  Dispense: 3 Box; Refill: 0    Paroxysmal atrial fibrillation  Rate controlled. Continue as per Cardiology.    Congestive heart failure, unspecified HF chronicity, unspecified heart failure type  As above.    Other ascites  As per GI.    Iron deficiency anemia due to chronic blood loss  As per Heme/Onc.    Granulomatous disease  Asymptomatic.    Thrush,  oral  -     nystatin (MYCOSTATIN) 100,000 unit/mL suspension; Take 4 mLs (400,000 Units total) by mouth 4 (four) times daily. for 10 days  Dispense: 160 mL; Refill: 0    Chronic low back pain without sciatica, unspecified back pain laterality  As per [facility].    Adenocarcinoma of prostate  As per Urology.    Arteriosclerosis of aorta  BP and lipid control as above.    Pacemaker  As per Cardiology.

## 2019-04-13 ENCOUNTER — PATIENT MESSAGE (OUTPATIENT)
Dept: FAMILY MEDICINE | Facility: CLINIC | Age: 79
End: 2019-04-13

## 2019-05-16 ENCOUNTER — PES CALL (OUTPATIENT)
Dept: ADMINISTRATIVE | Facility: CLINIC | Age: 79
End: 2019-05-16

## 2019-05-31 NOTE — TELEPHONE ENCOUNTER
----- Message from Hakan Villanueva sent at 5/11/2018  8:05 AM CDT -----  Review Chart,HSAT   Stones, review MRI

## 2019-07-29 DIAGNOSIS — E66.9 DIABETES MELLITUS TYPE 2 IN OBESE: ICD-10-CM

## 2019-07-29 DIAGNOSIS — E11.69 DIABETES MELLITUS TYPE 2 IN OBESE: ICD-10-CM

## 2019-07-30 RX ORDER — BLOOD SUGAR DIAGNOSTIC
STRIP MISCELLANEOUS
Qty: 100 STRIP | Refills: 0 | OUTPATIENT
Start: 2019-07-30

## 2019-07-30 RX ORDER — LANCETS
EACH MISCELLANEOUS
Qty: 100 EACH | Refills: 0 | OUTPATIENT
Start: 2019-07-30

## 2019-08-06 DIAGNOSIS — E11.69 DIABETES MELLITUS TYPE 2 IN OBESE: ICD-10-CM

## 2019-08-06 DIAGNOSIS — E66.9 DIABETES MELLITUS TYPE 2 IN OBESE: ICD-10-CM

## 2019-08-07 RX ORDER — LANCETS
EACH MISCELLANEOUS
Qty: 100 EACH | Refills: 11 | OUTPATIENT
Start: 2019-08-07

## 2019-09-20 ENCOUNTER — LAB VISIT (OUTPATIENT)
Dept: LAB | Facility: HOSPITAL | Age: 79
End: 2019-09-20
Attending: FAMILY MEDICINE
Payer: MEDICARE

## 2019-09-20 DIAGNOSIS — I10 ESSENTIAL HYPERTENSION: ICD-10-CM

## 2019-09-20 DIAGNOSIS — E11.9 DIABETES MELLITUS TYPE 2 IN NONOBESE: ICD-10-CM

## 2019-09-20 LAB
ALBUMIN SERPL BCP-MCNC: 4.6 G/DL (ref 3.5–5.2)
ALP SERPL-CCNC: 74 U/L (ref 55–135)
ALT SERPL W/O P-5'-P-CCNC: 15 U/L (ref 10–44)
ANION GAP SERPL CALC-SCNC: 12 MMOL/L (ref 8–16)
AST SERPL-CCNC: 21 U/L (ref 10–40)
BILIRUB SERPL-MCNC: 0.8 MG/DL (ref 0.1–1)
BUN SERPL-MCNC: 23 MG/DL (ref 8–23)
CALCIUM SERPL-MCNC: 10.2 MG/DL (ref 8.7–10.5)
CHLORIDE SERPL-SCNC: 101 MMOL/L (ref 95–110)
CHOLEST SERPL-MCNC: 174 MG/DL (ref 120–199)
CHOLEST/HDLC SERPL: 3.9 {RATIO} (ref 2–5)
CO2 SERPL-SCNC: 25 MMOL/L (ref 23–29)
CREAT SERPL-MCNC: 1.5 MG/DL (ref 0.5–1.4)
EST. GFR  (AFRICAN AMERICAN): 51.2 ML/MIN/1.73 M^2
EST. GFR  (NON AFRICAN AMERICAN): 44.3 ML/MIN/1.73 M^2
ESTIMATED AVG GLUCOSE: 128 MG/DL (ref 68–131)
GLUCOSE SERPL-MCNC: 127 MG/DL (ref 70–110)
HBA1C MFR BLD HPLC: 6.1 % (ref 4–5.6)
HDLC SERPL-MCNC: 45 MG/DL (ref 40–75)
HDLC SERPL: 25.9 % (ref 20–50)
LDLC SERPL CALC-MCNC: 87.8 MG/DL (ref 63–159)
NONHDLC SERPL-MCNC: 129 MG/DL
POTASSIUM SERPL-SCNC: 4.3 MMOL/L (ref 3.5–5.1)
PROT SERPL-MCNC: 8.1 G/DL (ref 6–8.4)
SODIUM SERPL-SCNC: 138 MMOL/L (ref 136–145)
TRIGL SERPL-MCNC: 206 MG/DL (ref 30–150)

## 2019-09-20 PROCEDURE — 80053 COMPREHEN METABOLIC PANEL: CPT | Mod: HCNC

## 2019-09-20 PROCEDURE — 80061 LIPID PANEL: CPT | Mod: HCNC

## 2019-09-20 PROCEDURE — 36415 COLL VENOUS BLD VENIPUNCTURE: CPT | Mod: HCNC,PO

## 2019-09-20 PROCEDURE — 83036 HEMOGLOBIN GLYCOSYLATED A1C: CPT | Mod: HCNC

## 2019-09-25 ENCOUNTER — HOSPITAL ENCOUNTER (OUTPATIENT)
Dept: RADIOLOGY | Facility: HOSPITAL | Age: 79
Discharge: HOME | End: 2019-09-25
Attending: FAMILY MEDICINE
Payer: MEDICARE

## 2019-09-25 ENCOUNTER — OFFICE VISIT (OUTPATIENT)
Dept: FAMILY MEDICINE | Facility: CLINIC | Age: 79
End: 2019-09-25
Payer: MEDICARE

## 2019-09-25 VITALS
HEART RATE: 83 BPM | DIASTOLIC BLOOD PRESSURE: 60 MMHG | SYSTOLIC BLOOD PRESSURE: 110 MMHG | WEIGHT: 200.31 LBS | OXYGEN SATURATION: 96 % | BODY MASS INDEX: 30.36 KG/M2 | HEIGHT: 68 IN | TEMPERATURE: 97 F | RESPIRATION RATE: 16 BRPM

## 2019-09-25 DIAGNOSIS — J30.9 ALLERGIC RHINITIS, UNSPECIFIED SEASONALITY, UNSPECIFIED TRIGGER: ICD-10-CM

## 2019-09-25 DIAGNOSIS — M54.50 CHRONIC LOW BACK PAIN WITHOUT SCIATICA, UNSPECIFIED BACK PAIN LATERALITY: ICD-10-CM

## 2019-09-25 DIAGNOSIS — I10 ESSENTIAL HYPERTENSION: ICD-10-CM

## 2019-09-25 DIAGNOSIS — J98.4 MIXED RESTRICTIVE AND OBSTRUCTIVE LUNG DISEASE: ICD-10-CM

## 2019-09-25 DIAGNOSIS — E66.9 DIABETES MELLITUS TYPE 2 IN OBESE: Primary | ICD-10-CM

## 2019-09-25 DIAGNOSIS — E11.69 DIABETES MELLITUS TYPE 2 IN OBESE: Primary | ICD-10-CM

## 2019-09-25 DIAGNOSIS — G89.29 CHRONIC LOW BACK PAIN WITHOUT SCIATICA, UNSPECIFIED BACK PAIN LATERALITY: ICD-10-CM

## 2019-09-25 DIAGNOSIS — I70.0 ARTERIOSCLEROSIS OF AORTA: ICD-10-CM

## 2019-09-25 DIAGNOSIS — M47.816 FACET ARTHROPATHY, LUMBAR: ICD-10-CM

## 2019-09-25 DIAGNOSIS — M51.36 DDD (DEGENERATIVE DISC DISEASE), LUMBAR: ICD-10-CM

## 2019-09-25 DIAGNOSIS — I50.9 CONGESTIVE HEART FAILURE, UNSPECIFIED HF CHRONICITY, UNSPECIFIED HEART FAILURE TYPE: ICD-10-CM

## 2019-09-25 DIAGNOSIS — D71 GRANULOMATOUS DISEASE: ICD-10-CM

## 2019-09-25 DIAGNOSIS — I48.0 PAROXYSMAL ATRIAL FIBRILLATION: ICD-10-CM

## 2019-09-25 DIAGNOSIS — E66.9 OBESITY (BMI 30-39.9): ICD-10-CM

## 2019-09-25 DIAGNOSIS — R79.89 ELEVATED SERUM CREATININE: ICD-10-CM

## 2019-09-25 DIAGNOSIS — C61 ADENOCARCINOMA OF PROSTATE: ICD-10-CM

## 2019-09-25 DIAGNOSIS — J43.9 MIXED RESTRICTIVE AND OBSTRUCTIVE LUNG DISEASE: ICD-10-CM

## 2019-09-25 PROCEDURE — 99999 PR PBB SHADOW E&M-EST. PATIENT-LVL V: CPT | Mod: PBBFAC,HCNC,, | Performed by: FAMILY MEDICINE

## 2019-09-25 PROCEDURE — 3078F PR MOST RECENT DIASTOLIC BLOOD PRESSURE < 80 MM HG: ICD-10-PCS | Mod: HCNC,CPTII,S$GLB, | Performed by: FAMILY MEDICINE

## 2019-09-25 PROCEDURE — 90662 FLU VACCINE - HIGH DOSE (65+) PRESERVATIVE FREE IM: ICD-10-PCS | Mod: HCNC,S$GLB,, | Performed by: FAMILY MEDICINE

## 2019-09-25 PROCEDURE — 3074F PR MOST RECENT SYSTOLIC BLOOD PRESSURE < 130 MM HG: ICD-10-PCS | Mod: HCNC,CPTII,S$GLB, | Performed by: FAMILY MEDICINE

## 2019-09-25 PROCEDURE — 1101F PR PT FALLS ASSESS DOC 0-1 FALLS W/OUT INJ PAST YR: ICD-10-PCS | Mod: HCNC,CPTII,S$GLB, | Performed by: FAMILY MEDICINE

## 2019-09-25 PROCEDURE — 99214 PR OFFICE/OUTPT VISIT, EST, LEVL IV, 30-39 MIN: ICD-10-PCS | Mod: HCNC,25,S$GLB, | Performed by: FAMILY MEDICINE

## 2019-09-25 PROCEDURE — 1101F PT FALLS ASSESS-DOCD LE1/YR: CPT | Mod: HCNC,CPTII,S$GLB, | Performed by: FAMILY MEDICINE

## 2019-09-25 PROCEDURE — 90662 IIV NO PRSV INCREASED AG IM: CPT | Mod: HCNC,S$GLB,, | Performed by: FAMILY MEDICINE

## 2019-09-25 PROCEDURE — 99999 PR PBB SHADOW E&M-EST. PATIENT-LVL V: ICD-10-PCS | Mod: PBBFAC,HCNC,, | Performed by: FAMILY MEDICINE

## 2019-09-25 PROCEDURE — 3078F DIAST BP <80 MM HG: CPT | Mod: HCNC,CPTII,S$GLB, | Performed by: FAMILY MEDICINE

## 2019-09-25 PROCEDURE — 72110 XR LUMBAR SPINE COMPLETE 5 VIEW: ICD-10-PCS | Mod: 26,HCNC,, | Performed by: RADIOLOGY

## 2019-09-25 PROCEDURE — 3074F SYST BP LT 130 MM HG: CPT | Mod: HCNC,CPTII,S$GLB, | Performed by: FAMILY MEDICINE

## 2019-09-25 PROCEDURE — 72110 X-RAY EXAM L-2 SPINE 4/>VWS: CPT | Mod: 26,HCNC,, | Performed by: RADIOLOGY

## 2019-09-25 PROCEDURE — 72110 X-RAY EXAM L-2 SPINE 4/>VWS: CPT | Mod: TC,HCNC,PO

## 2019-09-25 PROCEDURE — G0008 FLU VACCINE - HIGH DOSE (65+) PRESERVATIVE FREE IM: ICD-10-PCS | Mod: HCNC,S$GLB,, | Performed by: FAMILY MEDICINE

## 2019-09-25 PROCEDURE — 99214 OFFICE O/P EST MOD 30 MIN: CPT | Mod: HCNC,25,S$GLB, | Performed by: FAMILY MEDICINE

## 2019-09-25 PROCEDURE — G0008 ADMIN INFLUENZA VIRUS VAC: HCPCS | Mod: HCNC,S$GLB,, | Performed by: FAMILY MEDICINE

## 2019-09-25 RX ORDER — LEVOCETIRIZINE DIHYDROCHLORIDE 5 MG/1
5 TABLET, FILM COATED ORAL NIGHTLY
Qty: 90 TABLET | Refills: 1 | Status: SHIPPED | OUTPATIENT
Start: 2019-09-25 | End: 2019-12-06 | Stop reason: SDUPTHER

## 2019-09-25 RX ORDER — LANCETS
EACH MISCELLANEOUS
Qty: 300 EACH | Refills: 3 | Status: SHIPPED | OUTPATIENT
Start: 2019-09-25 | End: 2019-11-04 | Stop reason: SDUPTHER

## 2019-09-25 NOTE — PROGRESS NOTES
"Subjective:       Patient ID: Jake Ortiz is a 79 y.o. male.    Chief Complaint: Chronic Care    HPI   Chronic Care  80yo male presents today for chronic care assessment. He has had recent lab assessment and is here for review of results. A1c is measured at 6.1. He is not on any RX measures for glucose control.  He has been out of test strips over the past month- no recent glucose checks. On recall his last measurements were in the 's fasting. He admits to eating sweets and is not compliant with diabetic diet. He and his wife eat at [employer] on [address] and they also eat out regularly. He states "I have to watch my appetite cause I can eat like a mule". He has lost weight with the addition of Entresto to his regimen per Cardiology. He reports having recent assessment with Dr. Menendez at which time his stress test and an echo were obtained- no abnormalities were noted. He is interested in seeing Pain Management within [facility]. He has been seeing Dr. Victor Hugo Huertas at [facility] with previous injection therapy provided without benefit. He notes last injections were performed in March 2019. He has a friend who has undergone the same procedures and is pain free- he is frustrated that he is not. He is able to localize his pain to the lower lumbar region. He denies any radiation down either extremity. He is exercising and reports having ability to perform multiple tasks. There is no pain when he is seated. He had PSA monitoring earlier this year with negative findings. Yearly follow-up has been advised.     Review of Systems   Constitutional: Negative for activity change and unexpected weight change.   HENT: Negative for hearing loss, rhinorrhea and trouble swallowing.    Eyes: Negative for discharge and visual disturbance.   Respiratory: Negative for chest tightness and wheezing.    Cardiovascular: Negative for chest pain and palpitations.   Gastrointestinal: Negative for blood in stool, constipation, diarrhea and vomiting. " "  Endocrine: Negative for polydipsia and polyuria.   Genitourinary: Negative for difficulty urinating, hematuria and urgency.   Musculoskeletal: Positive for arthralgias. Negative for joint swelling and neck pain.   Neurological: Negative for weakness and headaches.   Psychiatric/Behavioral: Negative for confusion and dysphoric mood.       Objective:   /60   Pulse 83   Temp 96.7 °F (35.9 °C) (Temporal)   Resp 16   Ht 5' 8" (1.727 m)   Wt 90.9 kg (200 lb 4.6 oz)   SpO2 96%   BMI 30.45 kg/m²   Physical Exam   Constitutional: He is oriented to person, place, and time. He appears well-developed and well-nourished. No distress.   Obese, non-toxic   HENT:   Head: Normocephalic and atraumatic.   Right Ear: Tympanic membrane, external ear and ear canal normal.   Left Ear: Tympanic membrane, external ear and ear canal normal.   Nose: Nose normal.   Mouth/Throat: Oropharynx is clear and moist.   Eyes: Pupils are equal, round, and reactive to light. Conjunctivae and EOM are normal.   Neck: Normal range of motion. Neck supple.   Cardiovascular: Normal rate, regular rhythm and normal heart sounds.   Pulmonary/Chest: Effort normal and breath sounds normal.   Abdominal: Soft. Bowel sounds are normal.   Musculoskeletal: He exhibits no edema.   Neurological: He is alert and oriented to person, place, and time.   Skin: Skin is warm and dry. He is not diaphoretic.   Psychiatric: He has a normal mood and affect. His behavior is normal.       Assessment:       1. Diabetes mellitus type 2 in obese    2. Allergic rhinitis, unspecified seasonality, unspecified trigger    3. Essential hypertension    4. Elevated serum creatinine    5. Mixed restrictive and obstructive lung disease    6. Paroxysmal atrial fibrillation    7. Congestive heart failure, unspecified HF chronicity, unspecified heart failure type    8. Granulomatous disease    9. Chronic low back pain without sciatica, unspecified back pain laterality    10. DDD " (degenerative disc disease), lumbar    11. Facet arthropathy, lumbar    12. Arteriosclerosis of aorta    13. Adenocarcinoma of prostate    14. Obesity (BMI 30-39.9)        Plan:      Diabetes mellitus type 2 in obese  -     blood sugar diagnostic Strp; Check blood glucose levels daily in the AM fasting and 1-2 times more daily.  Dispense: 300 strip; Refill: 3  -     lancets Misc; Check blood glucose levels daily in the AM fasting and 1-2 times more daily.  Dispense: 300 each; Refill: 3  -     Comprehensive metabolic panel; Future; Expected date: 09/25/2019  -     Microalbumin/creatinine urine ratio; Future; Expected date: 09/25/2019  -     Hemoglobin A1c; Future; Expected date: 09/25/2019  -     Lipid panel; Future; Expected date: 09/25/2019    Allergic rhinitis, unspecified seasonality, unspecified trigger  -     levocetirizine (XYZAL) 5 MG tablet; Take 1 tablet (5 mg total) by mouth every evening.  Dispense: 90 tablet; Refill: 1    Essential hypertension  -     Comprehensive metabolic panel; Future; Expected date: 09/25/2019  -     Lipid panel; Future; Expected date: 09/25/2019    Elevated serum creatinine  -     Basic metabolic panel; Future; Expected date: 09/25/2019    Mixed restrictive and obstructive lung disease  As per Pulmonology.    Paroxysmal atrial fibrillation  Rate controlled. Continue as per Cardiology.    Congestive heart failure, unspecified HF chronicity, unspecified heart failure type  As per Cardiology.    Granulomatous disease  As per Pulmonology.    Chronic low back pain without sciatica, unspecified back pain laterality  -     X-Ray Lumbar Spine Complete 5 View; Future; Expected date: 09/25/2019  -     Ambulatory Referral to Pain Clinic    DDD (degenerative disc disease), lumbar  -     X-Ray Lumbar Spine Complete 5 View; Future; Expected date: 09/25/2019  -     Ambulatory Referral to Pain Clinic    Facet arthropathy, lumbar  -     X-Ray Lumbar Spine Complete 5 View; Future; Expected date:  09/25/2019  -     Ambulatory Referral to Pain Clinic    Arteriosclerosis of aorta  BP and lipid control remain advised.    Adenocarcinoma of prostate  As per Urology.    Obesity (BMI 30-39.9)  Weight loss efforts are encouraged through diet and lifestyle measures.    Other orders  -     Influenza - High Dose (65+) (PF) (IM)

## 2019-09-26 DIAGNOSIS — N28.9 ACUTE RENAL INSUFFICIENCY: Primary | ICD-10-CM

## 2019-10-02 ENCOUNTER — LAB VISIT (OUTPATIENT)
Dept: LAB | Facility: HOSPITAL | Age: 79
End: 2019-10-02
Attending: FAMILY MEDICINE
Payer: MEDICARE

## 2019-10-02 DIAGNOSIS — N28.9 ACUTE RENAL INSUFFICIENCY: ICD-10-CM

## 2019-10-02 PROCEDURE — 80048 BASIC METABOLIC PNL TOTAL CA: CPT | Mod: HCNC

## 2019-10-02 PROCEDURE — 36415 COLL VENOUS BLD VENIPUNCTURE: CPT | Mod: HCNC,PO

## 2019-10-03 ENCOUNTER — OFFICE VISIT (OUTPATIENT)
Dept: PAIN MEDICINE | Facility: CLINIC | Age: 79
End: 2019-10-03
Payer: MEDICARE

## 2019-10-03 VITALS
SYSTOLIC BLOOD PRESSURE: 112 MMHG | DIASTOLIC BLOOD PRESSURE: 65 MMHG | HEIGHT: 68 IN | HEART RATE: 68 BPM | BODY MASS INDEX: 29.9 KG/M2 | WEIGHT: 197.31 LBS

## 2019-10-03 DIAGNOSIS — M47.816 LUMBAR SPONDYLOSIS: Primary | ICD-10-CM

## 2019-10-03 LAB
ANION GAP SERPL CALC-SCNC: 12 MMOL/L (ref 8–16)
BUN SERPL-MCNC: 24 MG/DL (ref 8–23)
CALCIUM SERPL-MCNC: 9.9 MG/DL (ref 8.7–10.5)
CHLORIDE SERPL-SCNC: 104 MMOL/L (ref 95–110)
CO2 SERPL-SCNC: 23 MMOL/L (ref 23–29)
CREAT SERPL-MCNC: 1.5 MG/DL (ref 0.5–1.4)
EST. GFR  (AFRICAN AMERICAN): 51.2 ML/MIN/1.73 M^2
EST. GFR  (NON AFRICAN AMERICAN): 44.3 ML/MIN/1.73 M^2
GLUCOSE SERPL-MCNC: 121 MG/DL (ref 70–110)
POTASSIUM SERPL-SCNC: 4.7 MMOL/L (ref 3.5–5.1)
SODIUM SERPL-SCNC: 139 MMOL/L (ref 136–145)

## 2019-10-03 PROCEDURE — 99203 OFFICE O/P NEW LOW 30 MIN: CPT | Mod: HCNC,S$GLB,, | Performed by: PAIN MEDICINE

## 2019-10-03 PROCEDURE — 99499 UNLISTED E&M SERVICE: CPT | Mod: HCNC,S$GLB,, | Performed by: PAIN MEDICINE

## 2019-10-03 PROCEDURE — 1101F PR PT FALLS ASSESS DOC 0-1 FALLS W/OUT INJ PAST YR: ICD-10-PCS | Mod: HCNC,CPTII,S$GLB, | Performed by: PAIN MEDICINE

## 2019-10-03 PROCEDURE — 99999 PR PBB SHADOW E&M-EST. PATIENT-LVL V: ICD-10-PCS | Mod: PBBFAC,HCNC,, | Performed by: PAIN MEDICINE

## 2019-10-03 PROCEDURE — 3078F PR MOST RECENT DIASTOLIC BLOOD PRESSURE < 80 MM HG: ICD-10-PCS | Mod: HCNC,CPTII,S$GLB, | Performed by: PAIN MEDICINE

## 2019-10-03 PROCEDURE — 99999 PR PBB SHADOW E&M-EST. PATIENT-LVL V: CPT | Mod: PBBFAC,HCNC,, | Performed by: PAIN MEDICINE

## 2019-10-03 PROCEDURE — 1101F PT FALLS ASSESS-DOCD LE1/YR: CPT | Mod: HCNC,CPTII,S$GLB, | Performed by: PAIN MEDICINE

## 2019-10-03 PROCEDURE — 3074F PR MOST RECENT SYSTOLIC BLOOD PRESSURE < 130 MM HG: ICD-10-PCS | Mod: HCNC,CPTII,S$GLB, | Performed by: PAIN MEDICINE

## 2019-10-03 PROCEDURE — 3078F DIAST BP <80 MM HG: CPT | Mod: HCNC,CPTII,S$GLB, | Performed by: PAIN MEDICINE

## 2019-10-03 PROCEDURE — 99499 RISK ADDL DX/OHS AUDIT: ICD-10-PCS | Mod: HCNC,S$GLB,, | Performed by: PAIN MEDICINE

## 2019-10-03 PROCEDURE — 99203 PR OFFICE/OUTPT VISIT, NEW, LEVL III, 30-44 MIN: ICD-10-PCS | Mod: HCNC,S$GLB,, | Performed by: PAIN MEDICINE

## 2019-10-03 PROCEDURE — 3074F SYST BP LT 130 MM HG: CPT | Mod: HCNC,CPTII,S$GLB, | Performed by: PAIN MEDICINE

## 2019-10-03 NOTE — LETTER
October 3, 2019      Chanda Brush MD  [address] [facility] [address]           [facility]  [id number] [place] [id number]  Phone: [phone]  Fax: [phone]          Patient: Jake Ortiz   MR Number: [id number]   YOB: 1940   Date of Visit: 10/3/2019       Dear Dr. Chanda Brush:    Thank you for referring Jake Ortiz to me for evaluation. Attached you will find relevant portions of my assessment and plan of care.    If you have questions, please do not hesitate to call me. I look forward to following Jake Ortiz along with you.    Sincerely,    Gerson Al MD    Enclosure  CC:  No Recipients    If you would like to receive this communication electronically, please contact [email or web address] or [phone] to request more information on [facility] access.    For providers and/or their staff who would like to refer a patient to [facility], please contact us through our one-stop-shop provider referral line, [facility], at [phone].    If you feel you have received this communication in error or would no longer like to receive these types of communications, please e-mail [email or web address]

## 2019-10-03 NOTE — PROGRESS NOTES
Chief Pain Complaint:  Chief Complaint   Patient presents with    Back Pain     This note was created using voice recognition, there may be topographical errors that were missed during proofreading.    History of Present Illness:   This patient is a 79 y.o. male who presents today complaining of the above noted pain/s. The patient describes the pain as follows.  Mr. Ortiz is a new patient to clinic with complaints of back pain. Currently rates his pain 0/10 describes was seen aching sensation which is worse with bending and standing and has been somewhat improved with acupuncture.  His symptoms started many years ago he has tried several different types of therapy including physical therapy, chiropractor, acupuncture, heating pad and ice packs with varying degrees benefit.  He has undergone lumbar surgery in the past however is unsure 1 exact was perform the surgery.  There is no evidence of fusion on x-ray in all disc appeared to be intact. He has undergone medial branch blocks and radiofrequency ablation at an outside pain clinic in April 2019 however he reports that he has received 0 benefit from these procedures.  He has been using [employer] topical cream on lumbar spine which does provide some benefit.  He finds his symptoms are worse with activity such as standing for long periods to wash dishes and cleaning house while his symptoms are completely resolved with sitting and rest.  He denies having any rib radiating symptoms and denies having bowel bladder difficulty. He has used extra-strength Tylenol in the past with no benefit.  He has completed physical therapy in the past and in performs a pretty in depth exercise routine on a daily basis.    Previous Therapy:  Medications:  Extra-strength Tylenol, Klonopin  Injections: Lumbar Medial Branch Blocks and Lumbar Radiofrequency Ablation  Surgeries:  Lumbar surgery   Physical Therapy: Completed in the Past    Past Surgical History:   Procedure Laterality  Date    CORONARY ARTERY BYPASS GRAFT  1987    SPINE SURGERY      fusion    TONSILLECTOMY       Imaging / Labs / Studies (reviewed on 10/3/2019):    Results for orders placed in visit on 03/03/11   MRI Lumbar Spine Without Contrast    Narrative DATE OF EXAM: Mar  3 2011      BOC   0253  -  MRI L-SPINE WITHOUT CONTRAST:     [id number]     CLINICAL HISTORY:   724.2 [id number] LUMBAGO     RESULTS: THIS STUDY DEMONSTRATES SEVERE DEGENERATIVE END PLATE CHANGES AT L4-5 AND L2-3.  THERE IS A LARGE ANTERIOR OSTEOPHYTE PROJECTING OFF THE END PLATES OF L3, L2, AND L4.  THERE ARE ALSO PROMINENT DISC BULGES AT L4-5, L3-4, AND L2-3.  THIS STUDY DEMONSTRATES MULTILEVEL FACET ARTHROPATHY.       IMPRESSION: PROMINENT DEGENERATIVE DISC DISEASE AS DESCRIBED IN THE ABOVE PARAGRAPH. THIS STUDY ALSO DEMONSTRATES PROMINENT RIGHT NEURAL FORAMINAL NARROWING AT L4-5 AND LEFT NEURAL FORAMINAL NARROWING AT L4-5.      Results for orders placed during the hospital encounter of 06/08/11   MRI Lumbar Spine W WO Cont    Narrative DATE OF EXAM: Jun 8 2011      AMR   0013  -  MRI SPINE LUMBAR W & W/O CONT:     [id number]     RESULTS:  Indication:     Back pain patient's had extensive operative intervention   with laminectomy starting at L3 and extending to mid L4. There also   appears to be a right laminectomy at L4-L5. Multiple pedicle screws   apparently have been removed.     L1-L2 level unremarkable.     L2-L3 disc is narrowed there is mild spondylosis with minor facet   arthropathy present. There is some posterior paravertebral edema present   right greater than left extending from right facet lesion of L2-L3. There   is mild central stenosis present but the L2 nerve roots exit without   contact or distortion. There screw artifacts present in the pedicles at   L2 and L3-L4 and L5.     L3-L4 disc is narrowed there is mild spondylosis patient's had   laminectomy there is extensive para spinous muscular edema with fluid   along the left lamina and  paraspinous region is also considerable loss of   fat plane within the paravertebral musculature of the spine from L1-L2   down to sacral region.     L4-L5 level is obliterated right laminectomy is present is extensive   edema and loss of fat planes within the paravertebral musculature and   around the posterior and lateral thecal sac.     The L5-S1 disc is intact there is facet arthropathy present.     Postcontrast study demonstrates diffuse enhancement of the  paravertebral   soft tissues starting at approximately the T12 and extending to the   sacral level.     There is diffuse enhancement of the L2-L3 disc with enhancing tissues of   the posterior paraspinous region and minor facet joint enhancement. There   is also enhancement of the posterior epidural region resulting in some   distortion of the dorsal lateral thecal sac, left greater than right.     The L3-L4 level demonstrates minimal central and left-sided enhancement   of the disc with extensive  enhancement of the paraspinous elements with   enhancement of the posterior and lateral epidural canal. No intradural   enhancement is noted. There is some enhancement around the right residual   facet . A portion of the left facet complex is absent..     The L4-L5 level again demonstrates comparable diffuse paraspinous   enhancement and enhancement surrounding the thecal sac within the central   canal. There is enhancement of the bony elements including the pedicles   and facet complex.     L5-S1 demonstrate some  facet enhancement and posterior perithecal   enhancement           IMPRESSION:       Patient's had extensive operative intervention with removal of   pedicle screws at L2-L3 L4 and L5. There is extensive enhancement of the   operative bed in the posterior epidural region as well is some   enhancement of the scar tissue around the thecal sac. There Is no   intradural or intramedullary enhancement although there is clumping of   the cauda and enhancement  of the L2-L3 and L4-L5 disc. There is also   small amount enhancement of the posterior aspect of the L3-L4 disc.   enhancement consistent with gliosis. The various levels of bone   enhancement including the facet complex at L3 and L4 as well as portions   of the pedicles involving L5 involving and adjacent to the screw tracks..     Results for orders placed during the hospital encounter of 07/11/18   CT Lumbar Spine Without Contrast    Narrative EXAMINATION:  CT LUMBAR SPINE WITHOUT CONTRAST    CLINICAL HISTORY:  Chronic low back pain.    TECHNIQUE:  Axial CT imaging was performed through the lumbar spine without intravenous contrast. Multiplanar reformats were reviewed and interpreted.    COMPARISON:  Lumbar spine MRI performed on 06/08/2011 and lumbar spine radiographs on 08/08/2011    FINDINGS:  Since the prior MRI examination, development complete osseous fusion the L2 and L3 vertebral bodies.  Laminectomy/laminotomy deformities from L2 through L5.Minimal retrolisthesis at L3 with respect to L4 measuring approximately 4 mm.    At T12-L1 there is moderate degenerative disc disease with vacuum disc phenomenon.  Circumferential disc bulge with mild bilateral facet DJD that result in mild bilateral neural foraminal narrowing and mild spinal canal stenosis.    At L1-L2, there is no significant neural foraminal narrowing or spinal canal stenosis.    At L2-L3, no significant neural foraminal narrowing or spinal canal stenosis.    At L3-L4, there is moderate degenerative disc disease with vacuum disc phenomenon and central disc osteophyte complex.  Indents facet DJD contributes to moderate bilateral neural foraminal narrowing.    At L4-5, there is severe degenerative disc disease with vacuum disc phenomenon.  Advanced facet DJD contributes to moderate left and severe right neural foraminal narrowing is.    At L5-S1, there is mild bilateral neural foraminal narrowing secondary to facet DJD.    Extensive postoperative  stranding throughout the posterior.  Spinal tissues.    Ascites is demonstrated in the abdomen.      Impression Multilevel postoperative and degenerative changes as discussed in detail above.    All CT scans at this facility are performed  using dose modulation techniques as appropriate to performed exam including the following:  automated exposure control; adjustment of mA and/or kV according to the patients size (this includes techniques or standardized protocols for targeted exams where dose is matched to indication/reason for exam: i.e. extremities or head);  iterative reconstruction technique.     Results for orders placed during the hospital encounter of 09/25/19   X-Ray Lumbar Spine Complete 5 View    Narrative EXAMINATION:  XR LUMBAR SPINE COMPLETE 5 VIEW    CLINICAL HISTORY:  Low back pain, risk factors (osteoporosis or chronic steroid use or elderly);Low back pain    TECHNIQUE:  AP, lateral, spot and bilateral oblique views of the lumbar spine were performed.    COMPARISON:  05/30/2018    FINDINGS:  Scoliosis remains.  Vertebral body heights and alignment are stable.  Multilevel advanced degenerative disc height loss and osteophyte formation noted.  Multilevel facet arthropathy present more prevalent at the lower lumbar spine.  No acute osseous abnormality appreciated.  Aorta iliac atherosclerotic vascular calcifications noted.      Impression Similar appearance of the spine.  Correlate with MRI exam as clinically indicated.     Results for orders placed during the hospital encounter of 05/30/18   X-Ray Lumbar Spine AP And Lateral    Narrative EXAMINATION:  XR LUMBAR SPINE AP AND LATERAL    CLINICAL HISTORY:  chronic low back pain;Low back pain    TECHNIQUE:  AP, lateral and spot images were performed of the lumbar spine.    COMPARISON:  08/08/2011    FINDINGS:  Mild-to-moderate dextroscoliosis of the lumbar spine noted.  There is suggestion of possible mild loss of vertebral body height at L5 which may  potentially be projectional in nature and was not definitely seen on prior.  Age-indeterminate compression fracture not excluded.  Further characterization could be obtained with MRI as clinically warranted.  Moderate disc height loss from L2-3 through L3-4 appears unchanged.  Multilevel facet arthropathy suspected throughout the lumbar spine.  Posterior elements appear grossly intact. No acute fractures or subluxations are demonstrated.  Visualized osseous structures appear diffusely osteopenic.      Impression As above.     Results for orders placed during the hospital encounter of 08/26/14   X-Ray Cervical Spine AP And Lateral    Narrative Findings: Vertebral alignment is normal.  There is narrowing of the disk spaces and  anterior osteophyte formation C 3-7.  There are no compression fractures or acute abnormalities are seen.  Carotid calcifications are noted bilaterally.    Impression  Advanced degenerative change in the cervical spine.     Review of Systems:  Review of Systems   Constitutional: Negative for fever.   Eyes: Negative for blurred vision.   Respiratory: Negative for cough and wheezing.    Cardiovascular: Negative for chest pain and orthopnea.   Gastrointestinal: Negative for constipation, diarrhea, nausea and vomiting.   Genitourinary: Negative for dysuria.   Musculoskeletal: Positive for back pain.   Skin: Negative for itching and rash.   Neurological: Negative for weakness.   Endo/Heme/Allergies: Does not bruise/bleed easily.       Physical Exam:  There were no vitals taken for this visit. (reviewed on 10/3/2019)\  General    Constitutional: He is oriented to person, place, and time. He appears well-developed and well-nourished.   HENT:   Head: Normocephalic and atraumatic.   Eyes: EOM are normal.   Neck: Neck supple.   Pulmonary/Chest: Effort normal.   Abdominal: He exhibits no distension.   Neurological: He is alert and oriented to person, place, and time. No cranial nerve deficit.    Psychiatric: He has a normal mood and affect.     General Musculoskeletal Exam   Gait: normal     Back (L-Spine & T-Spine) / Neck (C-Spine) Exam     Tenderness Right paramedian tenderness of the Lower L-Spine. Left paramedian tenderness of the Lower L-Spine.     Back (L-Spine & T-Spine) Range of Motion   Extension: normal   Flexion: normal   Lateral bend right: normal   Lateral bend left: normal   Rotation right: normal   Rotation left: normal     Spinal Sensation   Right Side Sensation  L-Spine Level: normal  Left Side Sensation  L-Spine Level: normal    Comments:  Well healed surgical scar over lumbar spine; minimal tenderness palpation over bilateral lumbar facets; increased pain lumbar flexion-extension left right lateral bending; negative straight leg raise bilaterally; sensation intact to light touch bilateral lower extremities      Muscle Strength   Right Lower Extremity   Hip Flexion: 5/5   Quadriceps:  5/5   Hamstrin/5   Left Lower Extremity   Hip Flexion: 5/5   Quadriceps:  5/5   Hamstrin/5     Reflexes     Left Side  Quadriceps:  2+  Achilles:  2+  Ankle Clonus:  absent    Right Side   Quadriceps:  2+  Achilles:  2+  Ankle Clonus:  absent      Assessment  Lumbar Spondylosis  Lumbar Degenerative Disc Disease    1. 79 y.o. year old patient with PMH of   Past Medical History:   Diagnosis Date    Abnormal PFT     Anemia     Arthritis     Atrial fibrillation 10/19/2017    Back pain     Congestive heart failure 3/5/2018    Coronary artery disease     Diabetes mellitus 2018     am 2018    Hyperlipemia     Hypertension     Myocardial infarction     Obesity     NASREEN (obstructive sleep apnea) 2018    Prostate cancer 2015    Tobacco dependence       presenting with pain located lumbar spine  2. Pain Generators / Etiology: Lumbar Spondylosis and Lumbar Degenerative Disc Disease  3. Failed Meds (E- Effective, NE- Not Effective):  Tylenol-not effective  4. Physical Therapy -  Completed in the Past  5. Psychological comorbidities - None  6. Anticoagulants / Antiplatelets: Aspirin 81mg and Plavix     PLAN:  1. Medications:  Recommend turmeric 500 mg twice daily   2. PT - continue exercising at home as tolerated  3. Psychological - none  4. Labs - obtain  none  5. Imaging - obtain  none  6. Interventions - schedule none; offered lumbar epidural steroid injection for potential discogenic pain however patient like to hold off at this time; also discussed spinal cord stimulator trial as an option in the future  7. Referrals - none  8. Records - none  9. Follow up visit - follow up in clinic as needed  10. Patient Questions - answered all of the patient's questions regarding diagnosis, therapy, and treatment  11.  This condition does not require this patient to take time off of work    PREM Al MD  Interventional Pain  [facility] [place]

## 2019-10-03 NOTE — PATIENT INSTRUCTIONS
-recommend turmeric 500 mg twice daily over-the-counter  -continue exercising as tolerated  -follow up in clinic as needed

## 2019-10-04 ENCOUNTER — PATIENT MESSAGE (OUTPATIENT)
Dept: INTERNAL MEDICINE | Facility: CLINIC | Age: 79
End: 2019-10-04

## 2019-10-24 ENCOUNTER — OFFICE VISIT (OUTPATIENT)
Dept: FAMILY MEDICINE | Facility: CLINIC | Age: 79
End: 2019-10-24
Payer: MEDICARE

## 2019-10-24 VITALS
HEIGHT: 68 IN | HEART RATE: 79 BPM | DIASTOLIC BLOOD PRESSURE: 69 MMHG | WEIGHT: 202.19 LBS | SYSTOLIC BLOOD PRESSURE: 106 MMHG | OXYGEN SATURATION: 96 % | TEMPERATURE: 97 F | BODY MASS INDEX: 30.64 KG/M2

## 2019-10-24 DIAGNOSIS — G47.34 NOCTURNAL HYPOXEMIA: ICD-10-CM

## 2019-10-24 DIAGNOSIS — M51.36 LUMBAR DEGENERATIVE DISC DISEASE: Primary | ICD-10-CM

## 2019-10-24 DIAGNOSIS — J43.9 MIXED RESTRICTIVE AND OBSTRUCTIVE LUNG DISEASE: Chronic | ICD-10-CM

## 2019-10-24 DIAGNOSIS — K21.9 GASTROESOPHAGEAL REFLUX DISEASE WITHOUT ESOPHAGITIS: ICD-10-CM

## 2019-10-24 DIAGNOSIS — I25.10 CORONARY ARTERY DISEASE INVOLVING NATIVE CORONARY ARTERY OF NATIVE HEART WITHOUT ANGINA PECTORIS: ICD-10-CM

## 2019-10-24 DIAGNOSIS — G47.33 OBSTRUCTIVE SLEEP APNEA: ICD-10-CM

## 2019-10-24 DIAGNOSIS — I48.0 PAROXYSMAL ATRIAL FIBRILLATION: ICD-10-CM

## 2019-10-24 DIAGNOSIS — G47.61 PERIODIC LIMB MOVEMENT DISORDER (PLMD): ICD-10-CM

## 2019-10-24 DIAGNOSIS — D50.0 IRON DEFICIENCY ANEMIA DUE TO CHRONIC BLOOD LOSS: ICD-10-CM

## 2019-10-24 DIAGNOSIS — E78.2 MIXED HYPERLIPIDEMIA: ICD-10-CM

## 2019-10-24 DIAGNOSIS — R76.8 RHEUMATOID FACTOR POSITIVE: ICD-10-CM

## 2019-10-24 DIAGNOSIS — Z95.1 S/P CABG X 3: ICD-10-CM

## 2019-10-24 DIAGNOSIS — I70.0 ARTERIOSCLEROSIS OF AORTA: Primary | ICD-10-CM

## 2019-10-24 DIAGNOSIS — E11.69 DIABETES MELLITUS TYPE 2 IN OBESE: ICD-10-CM

## 2019-10-24 DIAGNOSIS — D71 GRANULOMATOUS DISEASE: ICD-10-CM

## 2019-10-24 DIAGNOSIS — J98.4 MIXED RESTRICTIVE AND OBSTRUCTIVE LUNG DISEASE: Chronic | ICD-10-CM

## 2019-10-24 DIAGNOSIS — M51.36 DDD (DEGENERATIVE DISC DISEASE), LUMBAR: ICD-10-CM

## 2019-10-24 DIAGNOSIS — I51.7 CARDIOMEGALY: ICD-10-CM

## 2019-10-24 DIAGNOSIS — J98.4 CHRONIC RESTRICTIVE LUNG DISEASE: ICD-10-CM

## 2019-10-24 DIAGNOSIS — M25.50 MULTIPLE JOINT PAIN: ICD-10-CM

## 2019-10-24 DIAGNOSIS — E66.9 DIABETES MELLITUS TYPE 2 IN OBESE: ICD-10-CM

## 2019-10-24 DIAGNOSIS — I10 ESSENTIAL HYPERTENSION: ICD-10-CM

## 2019-10-24 DIAGNOSIS — N28.9 RENAL INSUFFICIENCY: ICD-10-CM

## 2019-10-24 PROCEDURE — 99499 RISK ADDL DX/OHS AUDIT: ICD-10-PCS | Mod: HCNC,S$GLB,, | Performed by: NURSE PRACTITIONER

## 2019-10-24 PROCEDURE — 99999 PR PBB SHADOW E&M-EST. PATIENT-LVL IV: ICD-10-PCS | Mod: PBBFAC,HCNC,, | Performed by: NURSE PRACTITIONER

## 2019-10-24 PROCEDURE — 96160 PR PT FOCUSED HLTH RISK ASSMT: ICD-10-PCS | Mod: HCNC,S$GLB,, | Performed by: NURSE PRACTITIONER

## 2019-10-24 PROCEDURE — 99999 PR PBB SHADOW E&M-EST. PATIENT-LVL IV: CPT | Mod: PBBFAC,HCNC,, | Performed by: NURSE PRACTITIONER

## 2019-10-24 PROCEDURE — 96160 PT-FOCUSED HLTH RISK ASSMT: CPT | Mod: HCNC,S$GLB,, | Performed by: NURSE PRACTITIONER

## 2019-10-24 PROCEDURE — 99499 UNLISTED E&M SERVICE: CPT | Mod: HCNC,S$GLB,, | Performed by: NURSE PRACTITIONER

## 2019-10-24 NOTE — PROGRESS NOTES
"Jake Ortiz presented for a  Medicare AWV and comprehensive Health Risk Assessment today. The following components were reviewed and updated:    · Medical history  · Family History  · Social history  · Allergies and Current Medications  · Health Risk Assessment  · Health Maintenance  · Care Team     ** See Completed Assessments for Annual Wellness Visit within the encounter summary.**       The following assessments were completed:  · Living Situation  · CAGE  · Depression Screening  · Timed Get Up and Go  · Whisper Test  · Cognitive Function Screening  · Nutrition Screening  · ADL Screening  · PAQ Screening    Vitals:    10/24/19 1313   BP: 106/69   Pulse: 79   Temp: 96.8 °F (36 °C)   SpO2: 96%   Weight: 91.7 kg (202 lb 2.6 oz)   Height: 5' 8" (1.727 m)     Body mass index is 30.74 kg/m².  Physical Exam      Diagnoses and health risks identified today and associated recommendations/orders:    1. Arteriosclerosis of aorta  -stable continue current treatment plan.    2. Paroxysmal atrial fibrillation  -stable continue current treatment plan    3. Coronary artery disease involving native coronary artery of native heart without angina pectoris  -stable continue current treatment    4. Chronic restrictive lung disease  -stable continue current treatment plan    5. Cardiomegaly  Stable continue current treatment plan    6. DDD (degenerative disc disease), lumbar  Stable continue current treatment plan    7. Gastroesophageal reflux disease without esophagitis  -stable continue current treatment plan    8. Granulomatous disease  Stable continue current treatment plan    9. Mixed hyperlipidemia  -stable continue current treatment plan    10. Essential hypertension  -stable continue current treatment plan    11. Iron deficiency anemia due to chronic blood loss  Stable continue current treatment     12. Mixed restrictive and obstructive lung disease  Stable continue current treatment plan    13. Multiple joint pain  -stable " continue current treatment plan    14. Nocturnal hypoxemia  Stable continue current treatment plan    15. Obstructive sleep apnea  -stable continue current treatment plan    16. Periodic limb movement disorder (PLMD)  Stable continue current treatment plan    17. Renal insufficiency  stable    18. Rheumatoid factor positive      19. S/P CABG x 3        Provided Jake with a 5-10 year written screening schedule and personal prevention plan. Recommendations were developed using the USPSTF age appropriate recommendations. Education, counseling, and referrals were provided as needed. After Visit Summary printed and given to patient which includes a list of additional screenings\tests needed.    No follow-ups on file.    Shraddha Alvarenga NP  I offered to discuss end of life issues, including information on how to make advance directives that the patient could use to name someone who would make medical decisions on their behalf if they became too ill to make themselves.    _X_Patient declined  ___Patient is interested, I provided paper work and offered to discuss.

## 2019-11-01 ENCOUNTER — PATIENT MESSAGE (OUTPATIENT)
Dept: FAMILY MEDICINE | Facility: CLINIC | Age: 79
End: 2019-11-01

## 2019-11-04 DIAGNOSIS — E66.9 DIABETES MELLITUS TYPE 2 IN OBESE: ICD-10-CM

## 2019-11-04 DIAGNOSIS — E11.69 DIABETES MELLITUS TYPE 2 IN OBESE: ICD-10-CM

## 2019-11-04 RX ORDER — LANCETS
EACH MISCELLANEOUS
Qty: 300 EACH | Refills: 3 | Status: SHIPPED | OUTPATIENT
Start: 2019-11-04 | End: 2020-06-08 | Stop reason: SDUPTHER

## 2019-11-07 ENCOUNTER — TELEPHONE (OUTPATIENT)
Dept: PAIN MEDICINE | Facility: CLINIC | Age: 79
End: 2019-11-07

## 2019-11-07 NOTE — TELEPHONE ENCOUNTER
Pt also would need to hold aspirin and plavix  7 days prior to valentine procedure before we call to set up. Please advise .

## 2019-11-07 NOTE — TELEPHONE ENCOUNTER
Spoke with pt and got his cardiologist info to get clearance for asa and plavix prior to procedure with . Faxed clearance to Dr. Edy Menendez at [facility].

## 2019-11-13 ENCOUNTER — TELEPHONE (OUTPATIENT)
Dept: PAIN MEDICINE | Facility: CLINIC | Age: 79
End: 2019-11-13

## 2019-11-20 ENCOUNTER — TELEPHONE (OUTPATIENT)
Dept: PAIN MEDICINE | Facility: CLINIC | Age: 79
End: 2019-11-20

## 2019-11-20 NOTE — TELEPHONE ENCOUNTER
----- Message from Oh Croft MA sent at 11/20/2019  1:24 PM CST -----  Contact: Patient      ----- Message -----  From: Shelia Inman  Sent: 11/20/2019  12:19 PM CST  To: Mikaela Nieto Staff    .Type:  Patient Returning Call    Who Called:Patient  Who Left Message for Patient: JOB  Does the patient know what this is regarding?:  Would the patient rather a call back or a response via [facility]? Call  Best Call Back Number:[phone]   Additional Information: leave a message if no answer with the time and place.

## 2019-11-20 NOTE — TELEPHONE ENCOUNTER
left message on voicemail to call back at earliest convenience to discuss procedure with Dr. Al Friday, 11/22/19 as Dr. Al will be out.

## 2019-11-21 NOTE — PRE-PROCEDURE INSTRUCTIONS
Spoke with patient    regarding procedure scheduled on 11/22/19  Arrival time 1140  Has patient been sick with fever or on antibiotics within the last 7 days? no  Has patient received a vaccination within the last 7 days? no  Has the patient stopped all medications as directed? Yes, last dose of aspirin, plavix, fishoil, vitamin D, multivitamin on 11/14/19  Does patient have a pacemaker and or defibrillator? Yes, pacemaker  Does the patient have a ride to and from procedure and someone reliable to remain with patient? Yes, laila  Is the patient diabetic? yes  Does the patient have sleep apnea? Yes Or use O2 at home? no  Is the patient receiving sedation? yes  Is the patient instructed to remain NPO beginning at midnight the night before their procedure? yes  Procedure location confirmed with patient? yes

## 2019-11-22 ENCOUNTER — HOSPITAL ENCOUNTER (OUTPATIENT)
Facility: HOSPITAL | Age: 79
Discharge: HOME | End: 2019-11-22
Attending: PHYSICAL MEDICINE & REHABILITATION | Admitting: PHYSICAL MEDICINE & REHABILITATION
Payer: MEDICARE

## 2019-11-22 VITALS
DIASTOLIC BLOOD PRESSURE: 78 MMHG | TEMPERATURE: 98 F | OXYGEN SATURATION: 100 % | WEIGHT: 202.81 LBS | HEIGHT: 69 IN | RESPIRATION RATE: 18 BRPM | HEART RATE: 65 BPM | BODY MASS INDEX: 30.04 KG/M2 | SYSTOLIC BLOOD PRESSURE: 136 MMHG

## 2019-11-22 DIAGNOSIS — M54.16 LUMBAR RADICULOPATHY: ICD-10-CM

## 2019-11-22 LAB — POCT GLUCOSE: 122 MG/DL (ref 70–110)

## 2019-11-22 PROCEDURE — 62323 PR INJ LUMBAR/SACRAL, W/IMAGING GUIDANCE: ICD-10-PCS | Mod: HCNC,,, | Performed by: PHYSICAL MEDICINE & REHABILITATION

## 2019-11-22 PROCEDURE — 25000003 PHARM REV CODE 250: Mod: HCNC | Performed by: PHYSICAL MEDICINE & REHABILITATION

## 2019-11-22 PROCEDURE — 82962 GLUCOSE BLOOD TEST: CPT | Mod: HCNC | Performed by: PHYSICAL MEDICINE & REHABILITATION

## 2019-11-22 PROCEDURE — 62323 NJX INTERLAMINAR LMBR/SAC: CPT | Mod: HCNC | Performed by: PHYSICAL MEDICINE & REHABILITATION

## 2019-11-22 PROCEDURE — 62323 NJX INTERLAMINAR LMBR/SAC: CPT | Mod: HCNC,,, | Performed by: PHYSICAL MEDICINE & REHABILITATION

## 2019-11-22 PROCEDURE — 99152 MOD SED SAME PHYS/QHP 5/>YRS: CPT | Mod: HCNC,,, | Performed by: PHYSICAL MEDICINE & REHABILITATION

## 2019-11-22 PROCEDURE — 25500020 PHARM REV CODE 255: Mod: HCNC | Performed by: PHYSICAL MEDICINE & REHABILITATION

## 2019-11-22 PROCEDURE — 63600175 PHARM REV CODE 636 W HCPCS: Mod: HCNC | Performed by: PHYSICAL MEDICINE & REHABILITATION

## 2019-11-22 PROCEDURE — 99152 PR MOD CONSCIOUS SEDATION, SAME PHYS, 5+ YRS, FIRST 15 MIN: ICD-10-PCS | Mod: HCNC,,, | Performed by: PHYSICAL MEDICINE & REHABILITATION

## 2019-11-22 RX ORDER — FENTANYL CITRATE 50 UG/ML
INJECTION, SOLUTION INTRAMUSCULAR; INTRAVENOUS
Status: DISCONTINUED | OUTPATIENT
Start: 2019-11-22 | End: 2019-11-22 | Stop reason: HOSPADM

## 2019-11-22 RX ORDER — ONDANSETRON 2 MG/ML
4 INJECTION INTRAMUSCULAR; INTRAVENOUS ONCE AS NEEDED
Status: DISCONTINUED | OUTPATIENT
Start: 2019-11-22 | End: 2019-11-22 | Stop reason: HOSPADM

## 2019-11-22 RX ORDER — BUPIVACAINE HYDROCHLORIDE 2.5 MG/ML
INJECTION, SOLUTION EPIDURAL; INFILTRATION; INTRACAUDAL
Status: DISCONTINUED | OUTPATIENT
Start: 2019-11-22 | End: 2019-11-22 | Stop reason: HOSPADM

## 2019-11-22 RX ORDER — MIDAZOLAM HYDROCHLORIDE 1 MG/ML
INJECTION, SOLUTION INTRAMUSCULAR; INTRAVENOUS
Status: DISCONTINUED | OUTPATIENT
Start: 2019-11-22 | End: 2019-11-22 | Stop reason: HOSPADM

## 2019-11-22 RX ORDER — METHYLPREDNISOLONE ACETATE 40 MG/ML
INJECTION, SUSPENSION INTRA-ARTICULAR; INTRALESIONAL; INTRAMUSCULAR; SOFT TISSUE
Status: DISCONTINUED | OUTPATIENT
Start: 2019-11-22 | End: 2019-11-22 | Stop reason: HOSPADM

## 2019-11-22 NOTE — DISCHARGE SUMMARY
Discharge Note  Short Stay      SUMMARY     Admit Date: 11/22/2019    Attending Physician: Tacho Trivedi MD        Discharge Physician: Tacho Trivedi MD        Discharge Date: 11/22/2019 1:18 PM    Procedure(s) (LRB):  Lumbar L5/S1 IL XUAN (N/A)    Final Diagnosis: Lumbar degenerative disc disease [M51.36]    Disposition: Home or self care    Patient Instructions:   Current Discharge Medication List      CONTINUE these medications which have NOT CHANGED    Details   atorvastatin (LIPITOR) 80 MG tablet TK 1 T PO D  Refills: 3      furosemide (LASIX) 20 MG tablet Take 1 tablet (20 mg total) by mouth 2 (two) times daily.  Qty: 60 tablet, Refills: 5      lancets Misc Check blood glucose levels daily in the AM fasting and 1-2 times more daily.  Qty: 300 each, Refills: 3    Associated Diagnoses: Diabetes mellitus type 2 in obese      spironolactone (ALDACTONE) 50 MG tablet TK 1 T PO D  Refills: 6      acetaminophen (TYLENOL) 500 MG tablet Take 500 mg by mouth every 6 (six) hours as needed for Pain.      albuterol-ipratropium (DUO-NEB) 2.5 mg-0.5 mg/3 mL nebulizer solution INHALE CONTENTS OF 1 VIAL WITH NEBULIZER EVERY 6 HOURS AS NEEDED FOR WHEEZING  Qty: 990 mL, Refills: 0    Comments: **Patient requests 90 days supply**  Associated Diagnoses: Acute bronchitis, unspecified organism; Chronic restrictive lung disease; Mixed restrictive and obstructive lung disease      aspirin (ECOTRIN) 81 MG EC tablet Take 81 mg by mouth.      blood sugar diagnostic Strp Check blood glucose levels daily in the AM fasting and 1-2 times more daily.  Qty: 300 strip, Refills: 3    Associated Diagnoses: Diabetes mellitus type 2 in obese      blood-glucose meter kit Use as instructed  Qty: 1 each, Refills: 0    Associated Diagnoses: Diabetes mellitus type 2 in obese      cholecalciferol, vitamin D3, (VITAMIN D3) 1,000 unit capsule Take 5,000 Units by mouth once daily.      clonazePAM (KLONOPIN) 1 MG tablet TK 1 T PO HS  Refills: 5       clopidogrel (PLAVIX) 75 mg tablet Take 75 mg by mouth once daily.      doxycycline (VIBRAMYCIN) 100 MG Cap Take 1 capsule (100 mg total) by mouth every 12 (twelve) hours.  Qty: 20 capsule, Refills: 0    Associated Diagnoses: Acute maxillary sinusitis, recurrence not specified      ENTRESTO 24-26 mg per tablet       fish oil-omega-3 fatty acids 300-1,000 mg capsule Take 1 capsule by mouth once daily.      fluticasone (FLONASE) 50 mcg/actuation nasal spray 2 sprays (100 mcg total) by Each Nare route once daily.  Qty: 48 g, Refills: 0    Associated Diagnoses: Allergic rhinitis, unspecified seasonality, unspecified trigger      levocetirizine (XYZAL) 5 MG tablet Take 1 tablet (5 mg total) by mouth every evening.  Qty: 90 tablet, Refills: 1    Associated Diagnoses: Allergic rhinitis, unspecified seasonality, unspecified trigger      multivitamin capsule Take 1 capsule by mouth once daily.      nitroGLYCERIN (NITROSTAT) 0.3 MG SL tablet PLACE 1 TABLET UNDER THE TONGUE EVERY 5 MINUTES AS NEEDED FOR CHEST PAIN AS DIRECTED  Refills: 0                 Discharge Diagnosis: Lumbar degenerative disc disease [M51.36]  Condition on Discharge: Stable with no complications to procedure   Diet on Discharge: Same as before.  Activity: as per instruction sheet.  Discharge to: Home with a responsible adult.  Follow up: 2-4 weeks       Please call the office if you experience any weakness or loss of sensation, fever > 101.5, pain uncontrolled with oral medications, persistent nausea/vomiting/or diarrhea, redness or drainage from the incisions, or any other worrisome concerns. If physician on call was not reached or could not communicate with our office for any reason please go to the nearest emergency department

## 2019-11-22 NOTE — DISCHARGE INSTRUCTIONS

## 2019-11-22 NOTE — OP NOTE
Lumbar Interlaminar Epidural Steroid Injection under Fluoroscopic Guidance.   Time-out taken to identify patient and procedure prior to starting the procedure.     11/22/2019    PROCEDURE: Interlaminar epidural steroid injection under fluoroscopic guidance.     Pre-Op diagnosis: Lumbar degenerative disc disease [M51.36]    Post-Op diagnosis: Lumbar degenerative disc disease [M51.36]    PHYSICIAN: Tacho Trivedi MD  ASSISTANTS: None     Conscious sedation provided by M.D    The patient was monitored with continuous pulse oximetry, EKG, and intermittent blood pressure monitors.  The patient was hemodynamically stable throughout the entire process was responsive to voice, and breathing spontaneously.  Supplemental O2 was provided at 2L/min via nasal cannula.  Patient was comfortable for the duration of the procedure. (See nurse documentation and case log for sedation time)    There was a total of 1mg IV Midazolam and 25mcg Fentanyl titrated for the procedure    ESTIMATED BLOOD LOSS: none.   COMPLICATIONS: none.   SPECIMENS: none    TECHNIQUE: With the patient laying in a prone position, the area was prepped and draped in the usual sterile fashion using ChloraPrep and a fenestrated drape. 1% lidocaine was given using a 27-gauge needle by raising a wheal and going down to the hub of the needle over the L5/S1 interlaminar space.  The interlaminar space was then approached with a 3.5 inch 18-gauge Touhy needle was introduced under fluoroscopic guidance in the AP and Lateral view. Once the Ligamentum flavum was encountered loss of resistance to saline was used to enter the epidural space. With positive loss of resistance and negative CSF or Blood, 3mL contrast dye Omnipaque (300mg/ml) was injected to confirm placement and there was no vascular runoff. Then 1ml 40mg/ml Depomedrol + 1mL 0.25% Bupivicaine + 8mL preservative free normal saline was injected slowly. Displacement of the radio opaque contrast after injection of  the medication confirmed that the medication went into the area of the epidural space.  The patient tolerated the procedure well.           The patient was monitored after the procedure.   They were given post-procedure and discharge instructions to follow at home.  The patient was discharged in a stable condition.

## 2019-11-22 NOTE — H&P
"HPI  Patient presenting for Procedure(s) (LRB):  Lumbar L5/S1 IL XUAN (N/A)     Patient on Anti-coagulation Yes, ASA and Plavix - held x 7 days. Patient has had cardiac clearance before for other procedures in the past. He did not obtain formal clearance for this however, and he did not the medications on his own. I instructed him to resume his Plavix and ASA immediately following today's injection.     No health changes since previous encounter    Past Medical History:   Diagnosis Date    Abnormal PFT     Anemia     Arthritis     Atrial fibrillation 10/19/2017    Back pain     Congestive heart failure 3/5/2018    Coronary artery disease     Diabetes mellitus 01/2018     am 02/27/2018    Hyperlipemia     Hypertension     Myocardial infarction     Obesity     NASREEN (obstructive sleep apnea) 6/5/2018    Prostate cancer 2015    Tobacco dependence     Type 2 diabetes mellitus      Past Surgical History:   Procedure Laterality Date    CORONARY ARTERY BYPASS GRAFT  1987    PROSTATE SURGERY      prostate radiation    SPINE SURGERY      fusion    TONSILLECTOMY       Review of patient's allergies indicates:  No Known Allergies   No current facility-administered medications for this encounter.        PMHx, PSHx, Allergies, Medications reviewed in epic    ROS negative except pain complaints in HPI    OBJECTIVE:    BP (!) 155/75 (BP Location: Right arm, Patient Position: Sitting)   Pulse 88   Temp 97.9 °F (36.6 °C) (Oral)   Resp 18   Ht 5' 9" (1.753 m)   Wt 92 kg (202 lb 13.2 oz)   SpO2 97%   BMI 29.95 kg/m²     PHYSICAL EXAMINATION:    GENERAL: Well appearing, in no acute distress, alert and oriented x3.  PSYCH:  Mood and affect appropriate.  SKIN: Skin color, texture, turgor normal, no rashes or lesions which will impact the procedure.  CV: RRR with palpation of the radial artery.  PULM: No evidence of respiratory difficulty, symmetric chest rise. Clear to auscultation.  NEURO: Cranial nerves " grossly intact.    Plan:    Proceed with procedure as planned Procedure(s) (LRB):  Lumbar L5/S1 IL XUAN (N/A)    Tacho Trivedi MD  11/22/2019

## 2019-12-05 ENCOUNTER — PATIENT MESSAGE (OUTPATIENT)
Dept: PAIN MEDICINE | Facility: CLINIC | Age: 79
End: 2019-12-05

## 2019-12-06 DIAGNOSIS — J30.9 ALLERGIC RHINITIS, UNSPECIFIED SEASONALITY, UNSPECIFIED TRIGGER: ICD-10-CM

## 2019-12-07 RX ORDER — LEVOCETIRIZINE DIHYDROCHLORIDE 5 MG/1
TABLET, FILM COATED ORAL
Qty: 90 TABLET | Refills: 0 | Status: SHIPPED | OUTPATIENT
Start: 2019-12-07 | End: 2020-05-20 | Stop reason: SDUPTHER

## 2019-12-17 ENCOUNTER — OFFICE VISIT (OUTPATIENT)
Dept: PAIN MEDICINE | Facility: CLINIC | Age: 79
End: 2019-12-17
Payer: MEDICARE

## 2019-12-17 VITALS
DIASTOLIC BLOOD PRESSURE: 74 MMHG | HEIGHT: 69 IN | SYSTOLIC BLOOD PRESSURE: 119 MMHG | BODY MASS INDEX: 30.61 KG/M2 | HEART RATE: 84 BPM | WEIGHT: 206.69 LBS

## 2019-12-17 DIAGNOSIS — M47.816 LUMBAR SPONDYLOSIS: Primary | ICD-10-CM

## 2019-12-17 DIAGNOSIS — M51.36 DDD (DEGENERATIVE DISC DISEASE), LUMBAR: ICD-10-CM

## 2019-12-17 DIAGNOSIS — M54.16 LUMBAR RADICULOPATHY: ICD-10-CM

## 2019-12-17 PROCEDURE — 1125F PR PAIN SEVERITY QUANTIFIED, PAIN PRESENT: ICD-10-PCS | Mod: HCNC,S$GLB,, | Performed by: PHYSICIAN ASSISTANT

## 2019-12-17 PROCEDURE — 1101F PT FALLS ASSESS-DOCD LE1/YR: CPT | Mod: HCNC,CPTII,S$GLB, | Performed by: PHYSICIAN ASSISTANT

## 2019-12-17 PROCEDURE — 3078F DIAST BP <80 MM HG: CPT | Mod: HCNC,CPTII,S$GLB, | Performed by: PHYSICIAN ASSISTANT

## 2019-12-17 PROCEDURE — 3078F PR MOST RECENT DIASTOLIC BLOOD PRESSURE < 80 MM HG: ICD-10-PCS | Mod: HCNC,CPTII,S$GLB, | Performed by: PHYSICIAN ASSISTANT

## 2019-12-17 PROCEDURE — 1159F PR MEDICATION LIST DOCUMENTED IN MEDICAL RECORD: ICD-10-PCS | Mod: HCNC,S$GLB,, | Performed by: PHYSICIAN ASSISTANT

## 2019-12-17 PROCEDURE — 3074F SYST BP LT 130 MM HG: CPT | Mod: HCNC,CPTII,S$GLB, | Performed by: PHYSICIAN ASSISTANT

## 2019-12-17 PROCEDURE — 99999 PR PBB SHADOW E&M-EST. PATIENT-LVL V: CPT | Mod: PBBFAC,HCNC,, | Performed by: PHYSICIAN ASSISTANT

## 2019-12-17 PROCEDURE — 99214 OFFICE O/P EST MOD 30 MIN: CPT | Mod: HCNC,S$GLB,, | Performed by: PHYSICIAN ASSISTANT

## 2019-12-17 PROCEDURE — 1101F PR PT FALLS ASSESS DOC 0-1 FALLS W/OUT INJ PAST YR: ICD-10-PCS | Mod: HCNC,CPTII,S$GLB, | Performed by: PHYSICIAN ASSISTANT

## 2019-12-17 PROCEDURE — 99999 PR PBB SHADOW E&M-EST. PATIENT-LVL V: ICD-10-PCS | Mod: PBBFAC,HCNC,, | Performed by: PHYSICIAN ASSISTANT

## 2019-12-17 PROCEDURE — 3074F PR MOST RECENT SYSTOLIC BLOOD PRESSURE < 130 MM HG: ICD-10-PCS | Mod: HCNC,CPTII,S$GLB, | Performed by: PHYSICIAN ASSISTANT

## 2019-12-17 PROCEDURE — 1159F MED LIST DOCD IN RCRD: CPT | Mod: HCNC,S$GLB,, | Performed by: PHYSICIAN ASSISTANT

## 2019-12-17 PROCEDURE — 99214 PR OFFICE/OUTPT VISIT, EST, LEVL IV, 30-39 MIN: ICD-10-PCS | Mod: HCNC,S$GLB,, | Performed by: PHYSICIAN ASSISTANT

## 2019-12-17 PROCEDURE — 1125F AMNT PAIN NOTED PAIN PRSNT: CPT | Mod: HCNC,S$GLB,, | Performed by: PHYSICIAN ASSISTANT

## 2019-12-17 RX ORDER — TRAMADOL HYDROCHLORIDE 50 MG/1
TABLET ORAL
Qty: 5 TABLET | Refills: 0 | Status: SHIPPED | OUTPATIENT
Start: 2019-12-17 | End: 2020-01-31 | Stop reason: SDUPTHER

## 2019-12-17 NOTE — PROGRESS NOTES
Chief Pain Complaint:  Chief Complaint   Patient presents with    Follow-up   low back pain     Interval History: Patient was seen on 11/22/19. At that time he underwent L5/S1 IL XUAN.  The patient reports that he is/was unchanged following the procedure.  he reports some pain relief for a few days.  He is very active normally and would like to start dancing again.     Initial History of Present Illness:   This patient is a 79 y.o. male who presents today complaining of the above noted pain/s. The patient describes the pain as follows.  Mr. Ortiz is a new patient to clinic with complaints of back pain. Currently rates his pain 0/10 describes was seen aching sensation which is worse with bending and standing and has been somewhat improved with acupuncture.  His symptoms started many years ago he has tried several different types of therapy including physical therapy, chiropractor, acupuncture, heating pad and ice packs with varying degrees benefit.  He has undergone lumbar surgery in the past however is unsure 1 exact was perform the surgery.  There is no evidence of fusion on x-ray in all disc appeared to be intact. He has undergone medial branch blocks and radiofrequency ablation at an outside pain clinic in April 2019 however he reports that he has received 0 benefit from these procedures.  He has been using [employer] topical cream on lumbar spine which does provide some benefit.  He finds his symptoms are worse with activity such as standing for long periods to wash dishes and cleaning house while his symptoms are completely resolved with sitting and rest.  He denies having any rib radiating symptoms and denies having bowel bladder difficulty. He has used extra-strength Tylenol in the past with no benefit.  He has completed physical therapy in the past and in performs a pretty in depth exercise routine on a daily basis.    Previous Therapy:  Medications:  Extra-strength Tylenol, Klonopin  Injections:  -  series of 3 injections (what sounds like ESIs) about 30 years ago with relief  - Lumbar Medial Branch Blocks and Lumbar Radiofrequency Ablation with limited relief  - L5/S1 IL XUAN on 11/22/19 with some pain relief for a few days  Surgeries:  Lumbar surgery   Physical Therapy: Completed in the Past    Past Surgical History:   Procedure Laterality Date    CORONARY ARTERY BYPASS GRAFT  1987    EPIDURAL STEROID INJECTION N/A 11/22/2019    Procedure: Lumbar L5/S1 IL XUAN;  Surgeon: Tacho Trivedi MD;  Location: [facility];  Service: Pain Management;  Laterality: N/A;    PROSTATE SURGERY      prostate radiation    SPINE SURGERY      fusion    TONSILLECTOMY             Imaging / Labs / Studies (reviewed on 12/17/2019):    Results for orders placed during the hospital encounter of 07/11/18   CT Lumbar Spine Without Contrast    Narrative COMPARISON:  Lumbar spine MRI performed on 06/08/2011 and lumbar spine radiographs on 08/08/2011  FINDINGS:  Since the prior MRI examination, development complete osseous fusion the L2 and L3 vertebral bodies.  Laminectomy/laminotomy deformities from L2 through L5.Minimal retrolisthesis at L3 with respect to L4 measuring approximately 4 mm.  At T12-L1 there is moderate degenerative disc disease with vacuum disc phenomenon.  Circumferential disc bulge with mild bilateral facet DJD that result in mild bilateral neural foraminal narrowing and mild spinal canal stenosis.  At L1-L2, there is no significant neural foraminal narrowing or spinal canal stenosis.  At L2-L3, no significant neural foraminal narrowing or spinal canal stenosis.  At L3-L4, there is moderate degenerative disc disease with vacuum disc phenomenon and central disc osteophyte complex.  Indents facet DJD contributes to moderate bilateral neural foraminal narrowing.  At L4-5, there is severe degenerative disc disease with vacuum disc phenomenon.  Advanced facet DJD contributes to moderate left and severe right neural  foraminal narrowing is.  At L5-S1, there is mild bilateral neural foraminal narrowing secondary to facet DJD.  Extensive postoperative stranding throughout the posterior.  Spinal tissues.  Ascites is demonstrated in the abdomen.    Impression Multilevel postoperative and degenerative changes as discussed in detail above.  All CT scans at this facility are performed  using dose modulation techniques as appropriate to performed exam including the following:  automated exposure control; adjustment of mA and/or kV according to the patients size (this includes techniques or standardized protocols for targeted exams where dose is matched to indication/reason for exam: i.e. extremities or head);  iterative reconstruction technique.     Results for orders placed in visit on 03/03/11   MRI Lumbar Spine Without Contrast    Narrative RESULTS: THIS STUDY DEMONSTRATES SEVERE DEGENERATIVE END PLATE CHANGES AT L4-5 AND L2-3.  THERE IS A LARGE ANTERIOR OSTEOPHYTE PROJECTING OFF THE END PLATES OF L3, L2, AND L4.  THERE ARE ALSO PROMINENT DISC BULGES AT L4-5, L3-4, AND L2-3.  THIS STUDY DEMONSTRATES MULTILEVEL FACET ARTHROPATHY.    IMPRESSION: PROMINENT DEGENERATIVE DISC DISEASE AS DESCRIBED IN THE ABOVE PARAGRAPH. THIS STUDY ALSO DEMONSTRATES PROMINENT RIGHT NEURAL FORAMINAL NARROWING AT L4-5 AND LEFT NEURAL FORAMINAL NARROWING AT L4-5.      Results for orders placed during the hospital encounter of 06/08/11   MRI Lumbar Spine W WO Cont    Narrative RESULTS:  Indication:     Back pain patient's had extensive operative intervention   with laminectomy starting at L3 and extending to mid L4. There also   appears to be a right laminectomy at L4-L5. Multiple pedicle screws   apparently have been removed.  L1-L2 level unremarkable.  L2-L3 disc is narrowed there is mild spondylosis with minor facet   arthropathy present. There is some posterior paravertebral edema present   right greater than left extending from right facet lesion of  L2-L3. There   is mild central stenosis present but the L2 nerve roots exit without   contact or distortion. There screw artifacts present in the pedicles at   L2 and L3-L4 and L5.  L3-L4 disc is narrowed there is mild spondylosis patient's had   laminectomy there is extensive para spinous muscular edema with fluid   along the left lamina and paraspinous region is also considerable loss of   fat plane within the paravertebral musculature of the spine from L1-L2   down to sacral region.  L4-L5 level is obliterated right laminectomy is present is extensive   edema and loss of fat planes within the paravertebral musculature and   around the posterior and lateral thecal sac.  The L5-S1 disc is intact there is facet arthropathy present.  Postcontrast study demonstrates diffuse enhancement of the  paravertebral   soft tissues starting at approximately the T12 and extending to the   sacral level.  There is diffuse enhancement of the L2-L3 disc with enhancing tissues of   the posterior paraspinous region and minor facet joint enhancement. There   is also enhancement of the posterior epidural region resulting in some   distortion of the dorsal lateral thecal sac, left greater than right.  The L3-L4 level demonstrates minimal central and left-sided enhancement   of the disc with extensive  enhancement of the paraspinous elements with   enhancement of the posterior and lateral epidural canal. No intradural   enhancement is noted. There is some enhancement around the right residual   facet . A portion of the left facet complex is absent..  The L4-L5 level again demonstrates comparable diffuse paraspinous   enhancement and enhancement surrounding the thecal sac within the central   canal. There is enhancement of the bony elements including the pedicles   and facet complex.  L5-S1 demonstrate some  facet enhancement and posterior perithecal   enhancement  IMPRESSION:       Patient's had extensive operative intervention with  removal of   pedicle screws at L2-L3 L4 and L5. There is extensive enhancement of the   operative bed in the posterior epidural region as well is some   enhancement of the scar tissue around the thecal sac. There Is no   intradural or intramedullary enhancement although there is clumping of   the cauda and enhancement of the L2-L3 and L4-L5 disc. There is also   small amount enhancement of the posterior aspect of the L3-L4 disc.   enhancement consistent with gliosis. The various levels of bone   enhancement including the facet complex at L3 and L4 as well as portions   of the pedicles involving L5 involving and adjacent to the screw tracks..     Results for orders placed during the hospital encounter of 07/11/18   CT Lumbar Spine Without Contrast    Narrative COMPARISON:  Lumbar spine MRI performed on 06/08/2011 and lumbar spine radiographs on 08/08/2011  FINDINGS:  Since the prior MRI examination, development complete osseous fusion the L2 and L3 vertebral bodies.  Laminectomy/laminotomy deformities from L2 through L5.Minimal retrolisthesis at L3 with respect to L4 measuring approximately 4 mm.  At T12-L1 there is moderate degenerative disc disease with vacuum disc phenomenon.  Circumferential disc bulge with mild bilateral facet DJD that result in mild bilateral neural foraminal narrowing and mild spinal canal stenosis.  At L1-L2, there is no significant neural foraminal narrowing or spinal canal stenosis.  At L2-L3, no significant neural foraminal narrowing or spinal canal stenosis.  At L3-L4, there is moderate degenerative disc disease with vacuum disc phenomenon and central disc osteophyte complex.  Indents facet DJD contributes to moderate bilateral neural foraminal narrowing.  At L4-5, there is severe degenerative disc disease with vacuum disc phenomenon.  Advanced facet DJD contributes to moderate left and severe right neural foraminal narrowing is.  At L5-S1, there is mild bilateral neural foraminal  narrowing secondary to facet DJD.  Extensive postoperative stranding throughout the posterior.  Spinal tissues.  Ascites is demonstrated in the abdomen.    Impression Multilevel postoperative and degenerative changes as discussed in detail above.  All CT scans at this facility are performed  using dose modulation techniques as appropriate to performed exam including the following:  automated exposure control; adjustment of mA and/or kV according to the patients size (this includes techniques or standardized protocols for targeted exams where dose is matched to indication/reason for exam: i.e. extremities or head);  iterative reconstruction technique.     Results for orders placed during the hospital encounter of 09/25/19   X-Ray Lumbar Spine Complete 5 View    Narrative COMPARISON:  05/30/2018  FINDINGS:  Scoliosis remains.  Vertebral body heights and alignment are stable.  Multilevel advanced degenerative disc height loss and osteophyte formation noted.  Multilevel facet arthropathy present more prevalent at the lower lumbar spine.  No acute osseous abnormality appreciated.  Aorta iliac atherosclerotic vascular calcifications noted.    Impression Similar appearance of the spine.  Correlate with MRI exam as clinically indicated.     Results for orders placed during the hospital encounter of 05/30/18   X-Ray Lumbar Spine AP And Lateral    Narrative COMPARISON:  08/08/2011  FINDINGS:  Mild-to-moderate dextroscoliosis of the lumbar spine noted.  There is suggestion of possible mild loss of vertebral body height at L5 which may potentially be projectional in nature and was not definitely seen on prior.  Age-indeterminate compression fracture not excluded.  Further characterization could be obtained with MRI as clinically warranted.  Moderate disc height loss from L2-3 through L3-4 appears unchanged.  Multilevel facet arthropathy suspected throughout the lumbar spine.  Posterior elements appear grossly intact. No acute  "fractures or subluxations are demonstrated.  Visualized osseous structures appear diffusely osteopenic.    Impression As above.     Results for orders placed during the hospital encounter of 08/26/14   X-Ray Cervical Spine AP And Lateral    Narrative Findings: Vertebral alignment is normal.  There is narrowing of the disk spaces and  anterior osteophyte formation C 3-7.  There are no compression fractures or acute abnormalities are seen.  Carotid calcifications are noted bilaterally.    Impression  Advanced degenerative change in the cervical spine.           Review of Systems:  Constitutional: Negative for fever.   Eyes: Negative for blurred vision.   Respiratory: Negative for cough and wheezing.    Cardiovascular: Negative for chest pain and orthopnea.   Gastrointestinal: Negative for constipation, diarrhea, nausea and vomiting.   Genitourinary: Negative for dysuria.   Musculoskeletal: Positive for back pain.   Skin: Negative for itching and rash.   Neurological: Negative for weakness.   Endo/Heme/Allergies: Does not bruise/bleed easily.         Physical Exam:  Vitals:  /74   Pulse 84   Ht 5' 9" (1.753 m)   Wt 93.8 kg (206 lb 10.9 oz)   BMI 30.52 kg/m²   (reviewed on 12/17/2019)    General: alert and oriented, in no apparent distress.  Gait: normal gait.  Skin: no rashes, no discoloration, no obvious lesions  HEENT: normocephalic, atraumatic. Pupils equal and round.  Cardiovascular: no significant peripheral edema present.  Respiratory: without use of accessory muscles of respiration.    Musculoskeletal - Lumbar Spine:  - Inspection: healed scar   - Pain on flexion of lumbar spine: Absent   - Pain on extension of lumbar spine: Present  - Lumbar facet loading: Absent   - TTP over the lumbar facet joints: Present  - TTP over the lumbar paraspinals: Present  - TTP over the SI joints:  Absent   - TTP over GT bursa: Absent   - Straight Leg Raise: Negative  - MARVIN: Negative    Neuro - Lower Extremities:  - " RLE Strength:     >> 5/5 strength with right hip flexion/ extension    >> 5/5 strength with right knee flexion/ extension    >> 5/5 strength in right ankle with plantar and dorsiflexion  - LLE Strength:     >> 5/5 strength with left hip flexion/ extension    >> 5/5 strength with knee flexion extension on the left     >> 5/5 strength in left ankle with plantar and dorsiflexion  - Extremity Reflexes: Brisk and symmetric throughout  - Sensory: Sensation to light touch intact bilaterally      Psych:  Mood and affect is appropriate      Assessment  1. 79 y.o. year old patient with PMH of   Past Medical History:   Diagnosis Date    Abnormal PFT     Anemia     Arthritis     Atrial fibrillation 10/19/2017    Back pain     Congestive heart failure 3/5/2018    Coronary artery disease     Diabetes mellitus 01/2018     am 02/27/2018    Hyperlipemia     Hypertension     Myocardial infarction     Obesity     NASREEN (obstructive sleep apnea) 6/5/2018    Prostate cancer 2015    Tobacco dependence     Type 2 diabetes mellitus       presenting with pain located lumbar spine. Diagnoses include:    ICD-10-CM ICD-9-CM   1. Lumbar spondylosis M47.816 721.3   2. DDD (degenerative disc disease), lumbar M51.36 722.52   3. Lumbar radiculopathy M54.16 724.4      2. Pain Generators / Etiology: Lumbar Spondylosis and Lumbar Degenerative Disc Disease  3. Failed Meds (E- Effective, NE- Not Effective):  Tylenol-not effective  4. Physical Therapy - Completed in the Past  5. Psychological comorbidities - None  6. Anticoagulants / Antiplatelets: Aspirin 81mg and Plavix     Plan:  1. Interventional:   - S/p L5/S1 IL XUAN on 11/22/19 with some pain relief for a few days.   - Schedule repeat L5/S1 IL XUAN.  He had a series of 3 (what sounds like ESIs) in the past, which helped. Patient is taking ASA + Plavix; he will have to stop prior to procedure.  We should have recently obtained clearance from cardiology for 1st injection.  Patient  will be instructed to stop all ASA products, NSAIDs, tumeric, fish oil, and Vitamin E.      2. Pharmacologic: Will give tramadol 50mg PRN (5 tabs) for severe pain.     3. Rehabilitative: Encouraged regular exercise.    4. Diagnostic: None for now.    5. Follow up: 4 weeks post-injection    - I discussed the risks, benefits, and alternatives to potential treatment options. All questions and concerns were fully addressed today in clinic. Dr. Trivedi was consulted regarding the patient plan and agrees.

## 2019-12-17 NOTE — PATIENT INSTRUCTIONS
Pain Management Pre-Procedure Instructions  (also available in your [employer] account)    Patient Name:___Jake Ramoso____MRN: [id number] you are scheduled to have the following procedure:__ Epidural Steroid Injection  _with______Tacho Trivedi MD on: _____12/30/2019__ at: [facility]    You will be contacted the day before your procedure to be given an arrival and procedure time                                                                                                            Day of Procedure   Ensure you have obtained arrival time from the Pain Management department  o We will call 48 hours in advance with your arrival time. Please check any voicemails you may have  o If you arrive past your scheduled procedure time, you may be asked to reschedule your procedure.   For your safety, ensure you have a  with you to remain present throughout your procedure   o If you arrive without a responsible adult to stay with you and drive you home, you may be asked to reschedule your procedure   Take all of your prescribed medications (exceptions noted below) with a small amount of water  o STOP aspirin,ibuprofen, advil,aleve,excedrin,all supplements,and all vitamins  o [x] Nothing by mouth after midnight the night before your procedure.  It is ok to take your regular medications with a small sip of water.     Wear loose, comfortable clothing    You may wear glasses, dentures, contact lenses and/or hearing aids. Please leave all valuable items at home.   Contact the Pain Management department at [phone] or via [employer] if you are:  o Running a fever above 100 degrees  o Feel ill, have any type of infection, or are taking antibiotics now or have in the past 2 weeks  o Have had any outpatient procedures in the past 2 weeks (colonoscopy, major dental work, etc.)  o If you are allergic to iodine, IVP dye or shellfish.      Contact Information: [phone], ask to speak to the pain  management department with any questions or concerns or send a message via [employer]

## 2019-12-17 NOTE — H&P (VIEW-ONLY)
Chief Pain Complaint:  Chief Complaint   Patient presents with    Follow-up   low back pain     Interval History: Patient was seen on 11/22/19. At that time he underwent L5/S1 IL XUAN.  The patient reports that he is/was unchanged following the procedure.  he reports some pain relief for a few days.  He is very active normally and would like to start dancing again.     Initial History of Present Illness:   This patient is a 79 y.o. male who presents today complaining of the above noted pain/s. The patient describes the pain as follows.  Mr. Ortiz is a new patient to clinic with complaints of back pain. Currently rates his pain 0/10 describes was seen aching sensation which is worse with bending and standing and has been somewhat improved with acupuncture.  His symptoms started many years ago he has tried several different types of therapy including physical therapy, chiropractor, acupuncture, heating pad and ice packs with varying degrees benefit.  He has undergone lumbar surgery in the past however is unsure 1 exact was perform the surgery.  There is no evidence of fusion on x-ray in all disc appeared to be intact. He has undergone medial branch blocks and radiofrequency ablation at an outside pain clinic in April 2019 however he reports that he has received 0 benefit from these procedures.  He has been using [employer] topical cream on lumbar spine which does provide some benefit.  He finds his symptoms are worse with activity such as standing for long periods to wash dishes and cleaning house while his symptoms are completely resolved with sitting and rest.  He denies having any rib radiating symptoms and denies having bowel bladder difficulty. He has used extra-strength Tylenol in the past with no benefit.  He has completed physical therapy in the past and in performs a pretty in depth exercise routine on a daily basis.    Previous Therapy:  Medications:  Extra-strength Tylenol, Klonopin  Injections:  -  series of 3 injections (what sounds like ESIs) about 30 years ago with relief  - Lumbar Medial Branch Blocks and Lumbar Radiofrequency Ablation with limited relief  - L5/S1 IL XUAN on 11/22/19 with some pain relief for a few days  Surgeries:  Lumbar surgery   Physical Therapy: Completed in the Past    Past Surgical History:   Procedure Laterality Date    CORONARY ARTERY BYPASS GRAFT  1987    EPIDURAL STEROID INJECTION N/A 11/22/2019    Procedure: Lumbar L5/S1 IL XUAN;  Surgeon: Tacho Trivedi MD;  Location: [facility];  Service: Pain Management;  Laterality: N/A;    PROSTATE SURGERY      prostate radiation    SPINE SURGERY      fusion    TONSILLECTOMY             Imaging / Labs / Studies (reviewed on 12/17/2019):    Results for orders placed during the hospital encounter of 07/11/18   CT Lumbar Spine Without Contrast    Narrative COMPARISON:  Lumbar spine MRI performed on 06/08/2011 and lumbar spine radiographs on 08/08/2011  FINDINGS:  Since the prior MRI examination, development complete osseous fusion the L2 and L3 vertebral bodies.  Laminectomy/laminotomy deformities from L2 through L5.Minimal retrolisthesis at L3 with respect to L4 measuring approximately 4 mm.  At T12-L1 there is moderate degenerative disc disease with vacuum disc phenomenon.  Circumferential disc bulge with mild bilateral facet DJD that result in mild bilateral neural foraminal narrowing and mild spinal canal stenosis.  At L1-L2, there is no significant neural foraminal narrowing or spinal canal stenosis.  At L2-L3, no significant neural foraminal narrowing or spinal canal stenosis.  At L3-L4, there is moderate degenerative disc disease with vacuum disc phenomenon and central disc osteophyte complex.  Indents facet DJD contributes to moderate bilateral neural foraminal narrowing.  At L4-5, there is severe degenerative disc disease with vacuum disc phenomenon.  Advanced facet DJD contributes to moderate left and severe right neural  foraminal narrowing is.  At L5-S1, there is mild bilateral neural foraminal narrowing secondary to facet DJD.  Extensive postoperative stranding throughout the posterior.  Spinal tissues.  Ascites is demonstrated in the abdomen.    Impression Multilevel postoperative and degenerative changes as discussed in detail above.  All CT scans at this facility are performed  using dose modulation techniques as appropriate to performed exam including the following:  automated exposure control; adjustment of mA and/or kV according to the patients size (this includes techniques or standardized protocols for targeted exams where dose is matched to indication/reason for exam: i.e. extremities or head);  iterative reconstruction technique.     Results for orders placed in visit on 03/03/11   MRI Lumbar Spine Without Contrast    Narrative RESULTS: THIS STUDY DEMONSTRATES SEVERE DEGENERATIVE END PLATE CHANGES AT L4-5 AND L2-3.  THERE IS A LARGE ANTERIOR OSTEOPHYTE PROJECTING OFF THE END PLATES OF L3, L2, AND L4.  THERE ARE ALSO PROMINENT DISC BULGES AT L4-5, L3-4, AND L2-3.  THIS STUDY DEMONSTRATES MULTILEVEL FACET ARTHROPATHY.    IMPRESSION: PROMINENT DEGENERATIVE DISC DISEASE AS DESCRIBED IN THE ABOVE PARAGRAPH. THIS STUDY ALSO DEMONSTRATES PROMINENT RIGHT NEURAL FORAMINAL NARROWING AT L4-5 AND LEFT NEURAL FORAMINAL NARROWING AT L4-5.      Results for orders placed during the hospital encounter of 06/08/11   MRI Lumbar Spine W WO Cont    Narrative RESULTS:  Indication:     Back pain patient's had extensive operative intervention   with laminectomy starting at L3 and extending to mid L4. There also   appears to be a right laminectomy at L4-L5. Multiple pedicle screws   apparently have been removed.  L1-L2 level unremarkable.  L2-L3 disc is narrowed there is mild spondylosis with minor facet   arthropathy present. There is some posterior paravertebral edema present   right greater than left extending from right facet lesion of  L2-L3. There   is mild central stenosis present but the L2 nerve roots exit without   contact or distortion. There screw artifacts present in the pedicles at   L2 and L3-L4 and L5.  L3-L4 disc is narrowed there is mild spondylosis patient's had   laminectomy there is extensive para spinous muscular edema with fluid   along the left lamina and paraspinous region is also considerable loss of   fat plane within the paravertebral musculature of the spine from L1-L2   down to sacral region.  L4-L5 level is obliterated right laminectomy is present is extensive   edema and loss of fat planes within the paravertebral musculature and   around the posterior and lateral thecal sac.  The L5-S1 disc is intact there is facet arthropathy present.  Postcontrast study demonstrates diffuse enhancement of the  paravertebral   soft tissues starting at approximately the T12 and extending to the   sacral level.  There is diffuse enhancement of the L2-L3 disc with enhancing tissues of   the posterior paraspinous region and minor facet joint enhancement. There   is also enhancement of the posterior epidural region resulting in some   distortion of the dorsal lateral thecal sac, left greater than right.  The L3-L4 level demonstrates minimal central and left-sided enhancement   of the disc with extensive  enhancement of the paraspinous elements with   enhancement of the posterior and lateral epidural canal. No intradural   enhancement is noted. There is some enhancement around the right residual   facet . A portion of the left facet complex is absent..  The L4-L5 level again demonstrates comparable diffuse paraspinous   enhancement and enhancement surrounding the thecal sac within the central   canal. There is enhancement of the bony elements including the pedicles   and facet complex.  L5-S1 demonstrate some  facet enhancement and posterior perithecal   enhancement  IMPRESSION:       Patient's had extensive operative intervention with  removal of   pedicle screws at L2-L3 L4 and L5. There is extensive enhancement of the   operative bed in the posterior epidural region as well is some   enhancement of the scar tissue around the thecal sac. There Is no   intradural or intramedullary enhancement although there is clumping of   the cauda and enhancement of the L2-L3 and L4-L5 disc. There is also   small amount enhancement of the posterior aspect of the L3-L4 disc.   enhancement consistent with gliosis. The various levels of bone   enhancement including the facet complex at L3 and L4 as well as portions   of the pedicles involving L5 involving and adjacent to the screw tracks..     Results for orders placed during the hospital encounter of 07/11/18   CT Lumbar Spine Without Contrast    Narrative COMPARISON:  Lumbar spine MRI performed on 06/08/2011 and lumbar spine radiographs on 08/08/2011  FINDINGS:  Since the prior MRI examination, development complete osseous fusion the L2 and L3 vertebral bodies.  Laminectomy/laminotomy deformities from L2 through L5.Minimal retrolisthesis at L3 with respect to L4 measuring approximately 4 mm.  At T12-L1 there is moderate degenerative disc disease with vacuum disc phenomenon.  Circumferential disc bulge with mild bilateral facet DJD that result in mild bilateral neural foraminal narrowing and mild spinal canal stenosis.  At L1-L2, there is no significant neural foraminal narrowing or spinal canal stenosis.  At L2-L3, no significant neural foraminal narrowing or spinal canal stenosis.  At L3-L4, there is moderate degenerative disc disease with vacuum disc phenomenon and central disc osteophyte complex.  Indents facet DJD contributes to moderate bilateral neural foraminal narrowing.  At L4-5, there is severe degenerative disc disease with vacuum disc phenomenon.  Advanced facet DJD contributes to moderate left and severe right neural foraminal narrowing is.  At L5-S1, there is mild bilateral neural foraminal  narrowing secondary to facet DJD.  Extensive postoperative stranding throughout the posterior.  Spinal tissues.  Ascites is demonstrated in the abdomen.    Impression Multilevel postoperative and degenerative changes as discussed in detail above.  All CT scans at this facility are performed  using dose modulation techniques as appropriate to performed exam including the following:  automated exposure control; adjustment of mA and/or kV according to the patients size (this includes techniques or standardized protocols for targeted exams where dose is matched to indication/reason for exam: i.e. extremities or head);  iterative reconstruction technique.     Results for orders placed during the hospital encounter of 09/25/19   X-Ray Lumbar Spine Complete 5 View    Narrative COMPARISON:  05/30/2018  FINDINGS:  Scoliosis remains.  Vertebral body heights and alignment are stable.  Multilevel advanced degenerative disc height loss and osteophyte formation noted.  Multilevel facet arthropathy present more prevalent at the lower lumbar spine.  No acute osseous abnormality appreciated.  Aorta iliac atherosclerotic vascular calcifications noted.    Impression Similar appearance of the spine.  Correlate with MRI exam as clinically indicated.     Results for orders placed during the hospital encounter of 05/30/18   X-Ray Lumbar Spine AP And Lateral    Narrative COMPARISON:  08/08/2011  FINDINGS:  Mild-to-moderate dextroscoliosis of the lumbar spine noted.  There is suggestion of possible mild loss of vertebral body height at L5 which may potentially be projectional in nature and was not definitely seen on prior.  Age-indeterminate compression fracture not excluded.  Further characterization could be obtained with MRI as clinically warranted.  Moderate disc height loss from L2-3 through L3-4 appears unchanged.  Multilevel facet arthropathy suspected throughout the lumbar spine.  Posterior elements appear grossly intact. No acute  "fractures or subluxations are demonstrated.  Visualized osseous structures appear diffusely osteopenic.    Impression As above.     Results for orders placed during the hospital encounter of 08/26/14   X-Ray Cervical Spine AP And Lateral    Narrative Findings: Vertebral alignment is normal.  There is narrowing of the disk spaces and  anterior osteophyte formation C 3-7.  There are no compression fractures or acute abnormalities are seen.  Carotid calcifications are noted bilaterally.    Impression  Advanced degenerative change in the cervical spine.           Review of Systems:  Constitutional: Negative for fever.   Eyes: Negative for blurred vision.   Respiratory: Negative for cough and wheezing.    Cardiovascular: Negative for chest pain and orthopnea.   Gastrointestinal: Negative for constipation, diarrhea, nausea and vomiting.   Genitourinary: Negative for dysuria.   Musculoskeletal: Positive for back pain.   Skin: Negative for itching and rash.   Neurological: Negative for weakness.   Endo/Heme/Allergies: Does not bruise/bleed easily.         Physical Exam:  Vitals:  /74   Pulse 84   Ht 5' 9" (1.753 m)   Wt 93.8 kg (206 lb 10.9 oz)   BMI 30.52 kg/m²   (reviewed on 12/17/2019)    General: alert and oriented, in no apparent distress.  Gait: normal gait.  Skin: no rashes, no discoloration, no obvious lesions  HEENT: normocephalic, atraumatic. Pupils equal and round.  Cardiovascular: no significant peripheral edema present.  Respiratory: without use of accessory muscles of respiration.    Musculoskeletal - Lumbar Spine:  - Inspection: healed scar   - Pain on flexion of lumbar spine: Absent   - Pain on extension of lumbar spine: Present  - Lumbar facet loading: Absent   - TTP over the lumbar facet joints: Present  - TTP over the lumbar paraspinals: Present  - TTP over the SI joints:  Absent   - TTP over GT bursa: Absent   - Straight Leg Raise: Negative  - MARVIN: Negative    Neuro - Lower Extremities:  - " RLE Strength:     >> 5/5 strength with right hip flexion/ extension    >> 5/5 strength with right knee flexion/ extension    >> 5/5 strength in right ankle with plantar and dorsiflexion  - LLE Strength:     >> 5/5 strength with left hip flexion/ extension    >> 5/5 strength with knee flexion extension on the left     >> 5/5 strength in left ankle with plantar and dorsiflexion  - Extremity Reflexes: Brisk and symmetric throughout  - Sensory: Sensation to light touch intact bilaterally      Psych:  Mood and affect is appropriate      Assessment  1. 79 y.o. year old patient with PMH of   Past Medical History:   Diagnosis Date    Abnormal PFT     Anemia     Arthritis     Atrial fibrillation 10/19/2017    Back pain     Congestive heart failure 3/5/2018    Coronary artery disease     Diabetes mellitus 01/2018     am 02/27/2018    Hyperlipemia     Hypertension     Myocardial infarction     Obesity     NASREEN (obstructive sleep apnea) 6/5/2018    Prostate cancer 2015    Tobacco dependence     Type 2 diabetes mellitus       presenting with pain located lumbar spine. Diagnoses include:    ICD-10-CM ICD-9-CM   1. Lumbar spondylosis M47.816 721.3   2. DDD (degenerative disc disease), lumbar M51.36 722.52   3. Lumbar radiculopathy M54.16 724.4      2. Pain Generators / Etiology: Lumbar Spondylosis and Lumbar Degenerative Disc Disease  3. Failed Meds (E- Effective, NE- Not Effective):  Tylenol-not effective  4. Physical Therapy - Completed in the Past  5. Psychological comorbidities - None  6. Anticoagulants / Antiplatelets: Aspirin 81mg and Plavix     Plan:  1. Interventional:   - S/p L5/S1 IL XUAN on 11/22/19 with some pain relief for a few days.   - Schedule repeat L5/S1 IL XUAN.  He had a series of 3 (what sounds like ESIs) in the past, which helped. Patient is taking ASA + Plavix; he will have to stop prior to procedure.  We should have recently obtained clearance from cardiology for 1st injection.  Patient  will be instructed to stop all ASA products, NSAIDs, tumeric, fish oil, and Vitamin E.      2. Pharmacologic: Will give tramadol 50mg PRN (5 tabs) for severe pain.     3. Rehabilitative: Encouraged regular exercise.    4. Diagnostic: None for now.    5. Follow up: 4 weeks post-injection    - I discussed the risks, benefits, and alternatives to potential treatment options. All questions and concerns were fully addressed today in clinic. Dr. Trivedi was consulted regarding the patient plan and agrees.

## 2019-12-19 ENCOUNTER — TELEPHONE (OUTPATIENT)
Dept: PAIN MEDICINE | Facility: CLINIC | Age: 79
End: 2019-12-19

## 2019-12-19 NOTE — TELEPHONE ENCOUNTER
Informed pt that I had to obtain clearance from  for procedure to stop asa  and had to cancel until I get clearance and explained to pt to not stop any meds until I call to reschedule . Pt understood. All questions answered.

## 2019-12-22 ENCOUNTER — PATIENT MESSAGE (OUTPATIENT)
Dept: PAIN MEDICINE | Facility: CLINIC | Age: 79
End: 2019-12-22

## 2019-12-23 ENCOUNTER — TELEPHONE (OUTPATIENT)
Dept: PAIN MEDICINE | Facility: CLINIC | Age: 79
End: 2019-12-23

## 2019-12-23 NOTE — TELEPHONE ENCOUNTER
Contacted pt. Appt for procedure scheduled Jan 06, 2020 with . Went over instructions with pt and pt verbalized understanding.Instructions also mailed to pt.  All questions answered.     Pt also wants to know if he can have another refill of tramadol sent to [facility] for 30 days ? Pt stated that is the only medication to help take the edge off and help his pain be bearable. Informed pt I would consult with Dr. Trivedi and was unsure if  just gave a temporary medication.

## 2019-12-24 ENCOUNTER — PATIENT MESSAGE (OUTPATIENT)
Dept: PAIN MEDICINE | Facility: HOSPITAL | Age: 79
End: 2019-12-24

## 2019-12-30 NOTE — PRE-PROCEDURE INSTRUCTIONS
Spoke with  patient     regarding procedure scheduled on 1/6/20  Arrival time not yet approved, will update  Has patient been sick with fever or on antibiotics within the last 7 days? no  Has patient received a vaccination within the last 7 days? no  Has the patient stopped all medications as directed? Yes, last dose of aspirin, vitamin D, plavix, fish oil, and multivitamin on 12/29/19  Does patient have a pacemaker and or defibrillator? no  Does the patient have a ride to and from procedure and someone reliable to remain with patient? Yes, wife hiwot  Is the patient diabetic? Controlled glucose  Does the patient have sleep apnea? Or use O2 at home? No no  Is the patient receiving sedation? yes  Is the patient instructed to remain NPO beginning at midnight the night before their procedure? yes  Procedure location confirmed with patient? yes

## 2020-01-06 ENCOUNTER — HOSPITAL ENCOUNTER (OUTPATIENT)
Facility: HOSPITAL | Age: 80
Discharge: HOME | End: 2020-01-06
Attending: PHYSICAL MEDICINE & REHABILITATION | Admitting: PHYSICAL MEDICINE & REHABILITATION
Payer: MEDICARE

## 2020-01-06 VITALS
TEMPERATURE: 98 F | RESPIRATION RATE: 18 BRPM | OXYGEN SATURATION: 99 % | DIASTOLIC BLOOD PRESSURE: 67 MMHG | WEIGHT: 206.13 LBS | BODY MASS INDEX: 29.51 KG/M2 | HEART RATE: 60 BPM | SYSTOLIC BLOOD PRESSURE: 139 MMHG | HEIGHT: 70 IN

## 2020-01-06 DIAGNOSIS — M54.16 LUMBAR RADICULOPATHY: ICD-10-CM

## 2020-01-06 LAB — POCT GLUCOSE: 127 MG/DL (ref 70–110)

## 2020-01-06 PROCEDURE — 25500020 PHARM REV CODE 255: Mod: HCNC | Performed by: PHYSICAL MEDICINE & REHABILITATION

## 2020-01-06 PROCEDURE — 99152 PR MOD CONSCIOUS SEDATION, SAME PHYS, 5+ YRS, FIRST 15 MIN: ICD-10-PCS | Mod: HCNC,,, | Performed by: PHYSICAL MEDICINE & REHABILITATION

## 2020-01-06 PROCEDURE — 82962 GLUCOSE BLOOD TEST: CPT | Mod: HCNC | Performed by: PHYSICAL MEDICINE & REHABILITATION

## 2020-01-06 PROCEDURE — 25000003 PHARM REV CODE 250: Mod: HCNC | Performed by: PHYSICAL MEDICINE & REHABILITATION

## 2020-01-06 PROCEDURE — 62323 NJX INTERLAMINAR LMBR/SAC: CPT | Mod: HCNC | Performed by: PHYSICAL MEDICINE & REHABILITATION

## 2020-01-06 PROCEDURE — 62323 NJX INTERLAMINAR LMBR/SAC: CPT | Mod: HCNC,,, | Performed by: PHYSICAL MEDICINE & REHABILITATION

## 2020-01-06 PROCEDURE — 63600175 PHARM REV CODE 636 W HCPCS: Mod: HCNC | Performed by: PHYSICAL MEDICINE & REHABILITATION

## 2020-01-06 PROCEDURE — 99152 MOD SED SAME PHYS/QHP 5/>YRS: CPT | Mod: HCNC | Performed by: PHYSICAL MEDICINE & REHABILITATION

## 2020-01-06 PROCEDURE — 62323 PR INJ LUMBAR/SACRAL, W/IMAGING GUIDANCE: ICD-10-PCS | Mod: HCNC,,, | Performed by: PHYSICAL MEDICINE & REHABILITATION

## 2020-01-06 PROCEDURE — 99152 MOD SED SAME PHYS/QHP 5/>YRS: CPT | Mod: HCNC,,, | Performed by: PHYSICAL MEDICINE & REHABILITATION

## 2020-01-06 RX ORDER — FENTANYL CITRATE 50 UG/ML
INJECTION, SOLUTION INTRAMUSCULAR; INTRAVENOUS
Status: DISCONTINUED | OUTPATIENT
Start: 2020-01-06 | End: 2020-01-06 | Stop reason: HOSPADM

## 2020-01-06 RX ORDER — BUPIVACAINE HYDROCHLORIDE 2.5 MG/ML
INJECTION, SOLUTION EPIDURAL; INFILTRATION; INTRACAUDAL
Status: DISCONTINUED | OUTPATIENT
Start: 2020-01-06 | End: 2020-01-06 | Stop reason: HOSPADM

## 2020-01-06 RX ORDER — MIDAZOLAM HYDROCHLORIDE 1 MG/ML
INJECTION, SOLUTION INTRAMUSCULAR; INTRAVENOUS
Status: DISCONTINUED | OUTPATIENT
Start: 2020-01-06 | End: 2020-01-06 | Stop reason: HOSPADM

## 2020-01-06 RX ORDER — ONDANSETRON 2 MG/ML
4 INJECTION INTRAMUSCULAR; INTRAVENOUS ONCE AS NEEDED
Status: DISCONTINUED | OUTPATIENT
Start: 2020-01-06 | End: 2020-12-20

## 2020-01-06 RX ORDER — METHYLPREDNISOLONE ACETATE 40 MG/ML
INJECTION, SUSPENSION INTRA-ARTICULAR; INTRALESIONAL; INTRAMUSCULAR; SOFT TISSUE
Status: DISCONTINUED | OUTPATIENT
Start: 2020-01-06 | End: 2020-01-06 | Stop reason: HOSPADM

## 2020-01-06 NOTE — PLAN OF CARE
Pt aaa o x's 4, denies any pain/discomfort, what to expect during recovery discussed with pt at bedside. Pt verbalized understanding of all post op instructions. All questions and concerns addressed and answered, will continue to monitor pt until discharged.

## 2020-01-06 NOTE — DISCHARGE INSTRUCTIONS

## 2020-01-06 NOTE — DISCHARGE SUMMARY
Discharge Note  Short Stay      SUMMARY     Admit Date: 1/6/2020    Attending Physician: Tacho Trivedi MD        Discharge Physician: Tacho Trivedi MD        Discharge Date: 1/6/2020 11:28 AM    Procedure(s) (LRB):  Lumbar L5/S1 IL XUAN (N/A)    Final Diagnosis: DDD (degenerative disc disease), lumbar [M51.36]  Lumbar radiculopathy [M54.16]    Disposition: Home or self care    Patient Instructions:   Current Discharge Medication List      CONTINUE these medications which have NOT CHANGED    Details   aspirin (ECOTRIN) 81 MG EC tablet Take 81 mg by mouth.      atorvastatin (LIPITOR) 80 MG tablet TK 1 T PO D  Refills: 3      fluticasone (FLONASE) 50 mcg/actuation nasal spray 2 sprays (100 mcg total) by Each Nare route once daily.  Qty: 48 g, Refills: 0    Associated Diagnoses: Allergic rhinitis, unspecified seasonality, unspecified trigger      spironolactone (ALDACTONE) 50 MG tablet TK 1 T PO D  Refills: 6      acetaminophen (TYLENOL) 500 MG tablet Take 500 mg by mouth every 6 (six) hours as needed for Pain.      albuterol-ipratropium (DUO-NEB) 2.5 mg-0.5 mg/3 mL nebulizer solution INHALE CONTENTS OF 1 VIAL WITH NEBULIZER EVERY 6 HOURS AS NEEDED FOR WHEEZING  Qty: 990 mL, Refills: 0    Comments: **Patient requests 90 days supply**  Associated Diagnoses: Acute bronchitis, unspecified organism; Chronic restrictive lung disease; Mixed restrictive and obstructive lung disease      blood sugar diagnostic Strp Check blood glucose levels daily in the AM fasting and 1-2 times more daily.  Qty: 300 strip, Refills: 3    Associated Diagnoses: Diabetes mellitus type 2 in obese      blood-glucose meter kit Use as instructed  Qty: 1 each, Refills: 0    Associated Diagnoses: Diabetes mellitus type 2 in obese      cholecalciferol, vitamin D3, (VITAMIN D3) 1,000 unit capsule Take 5,000 Units by mouth once daily.      clonazePAM (KLONOPIN) 1 MG tablet TK 1 T PO HS  Refills: 5      clopidogrel (PLAVIX) 75 mg tablet Take 75 mg by  mouth once daily.      doxycycline (VIBRAMYCIN) 100 MG Cap Take 1 capsule (100 mg total) by mouth every 12 (twelve) hours.  Qty: 20 capsule, Refills: 0    Associated Diagnoses: Acute maxillary sinusitis, recurrence not specified      ENTRESTO 24-26 mg per tablet       fish oil-omega-3 fatty acids 300-1,000 mg capsule Take 1 capsule by mouth once daily.      furosemide (LASIX) 20 MG tablet Take 1 tablet (20 mg total) by mouth 2 (two) times daily.  Qty: 60 tablet, Refills: 5      lancets Misc Check blood glucose levels daily in the AM fasting and 1-2 times more daily.  Qty: 300 each, Refills: 3    Associated Diagnoses: Diabetes mellitus type 2 in obese      levocetirizine (XYZAL) 5 MG tablet TAKE 1 TABLET EVERY EVENING  Qty: 90 tablet, Refills: 0    Associated Diagnoses: Allergic rhinitis, unspecified seasonality, unspecified trigger      multivitamin capsule Take 1 capsule by mouth once daily.      nitroGLYCERIN (NITROSTAT) 0.3 MG SL tablet PLACE 1 TABLET UNDER THE TONGUE EVERY 5 MINUTES AS NEEDED FOR CHEST PAIN AS DIRECTED  Refills: 0      traMADol (ULTRAM) 50 mg tablet Take 1/2 to 1 tab PO QD to BID PRN pain.  Qty: 5 tablet, Refills: 0    Comments: Quantity prescribed more than 7 day supply? No  Associated Diagnoses: Lumbar spondylosis; DDD (degenerative disc disease), lumbar; Lumbar radiculopathy                 Discharge Diagnosis: DDD (degenerative disc disease), lumbar [M51.36]  Lumbar radiculopathy [M54.16]  Condition on Discharge: Stable with no complications to procedure   Diet on Discharge: Same as before.  Activity: as per instruction sheet.  Discharge to: Home with a responsible adult.  Follow up: 2-4 weeks       Please call the office if you experience any weakness or loss of sensation, fever > 101.5, pain uncontrolled with oral medications, persistent nausea/vomiting/or diarrhea, redness or drainage from the incisions, or any other worrisome concerns. If physician on call was not reached or could not  communicate with our office for any reason please go to the nearest emergency department

## 2020-01-06 NOTE — OP NOTE
Lumbar Interlaminar Epidural Steroid Injection under Fluoroscopic Guidance.   Time-out taken to identify patient and procedure prior to starting the procedure.     01/06/2020    PROCEDURE: Interlaminar epidural steroid injection under fluoroscopic guidance.     Pre-Op diagnosis: DDD (degenerative disc disease), lumbar [M51.36]  Lumbar radiculopathy [M54.16]    Post-Op diagnosis: DDD (degenerative disc disease), lumbar [M51.36]  Lumbar radiculopathy [M54.16]    PHYSICIAN: Tacho Trivedi MD  ASSISTANTS: None     SEDATION:   Conscious sedation provided by M.D    The patient was monitored with continuous pulse oximetry, EKG, and intermittent blood pressure monitors.  The patient was hemodynamically stable throughout the entire process was responsive to voice, and breathing spontaneously.  Supplemental O2 was provided at 2L/min via nasal cannula.  Patient was comfortable for the duration of the procedure. (See nurse documentation and case log for sedation time)    There was a total of 2mg IV Midazolam and 25mcg Fentanyl titrated for the procedure    ESTIMATED BLOOD LOSS: none.  COMPLICATIONS: none.   SPECIMENS: none    TECHNIQUE: With the patient laying in a prone position, the area was prepped and draped in the usual sterile fashion using ChloraPrep and a fenestrated drape. 1% lidocaine was given using a 27-gauge needle by raising a wheal and going down to the hub of the needle over the L5/S1 interlaminar space.  The interlaminar space was then approached with a 3.5 inch 18-gauge Touhy needle was introduced under fluoroscopic guidance in the AP and Lateral view. Once the Ligamentum flavum was encountered loss of resistance to saline was used to enter the epidural space. With positive loss of resistance and negative CSF or Blood, 3mL contrast dye Omnipaque (300mg/ml) was injected to confirm placement and there was no vascular runoff. Then 1ml 40mg/ml Depomedrol + 1mL 0.25% Bupivicaine + 8mL preservative free normal  saline was injected slowly. Displacement of the radio opaque contrast after injection of the medication confirmed that the medication went into the area of the epidural space.  The patient tolerated the procedure well.         The patient was monitored after the procedure.   They were given post-procedure and discharge instructions to follow at home.  The patient was discharged in a stable condition.

## 2020-01-06 NOTE — INTERVAL H&P NOTE
The patient has been examined and the H&P has been reviewed:    I concur with the findings and no changes have occurred since H&P was written.    Anesthesia/Surgery risks, benefits and alternative options discussed and understood by patient/family.          Active Hospital Problems    Diagnosis  POA    Lumbar radiculopathy [M54.16]  Yes      Resolved Hospital Problems   No resolved problems to display.     Plan:  Proceed with repeat L5-S1 INTERLAMINAR EPIDURAL STEROID INJECTION    Tacho Trivedi MD  Interventional Pain Medicine  [facility] [place]

## 2020-01-28 ENCOUNTER — PATIENT MESSAGE (OUTPATIENT)
Dept: PAIN MEDICINE | Facility: CLINIC | Age: 80
End: 2020-01-28

## 2020-01-31 ENCOUNTER — OFFICE VISIT (OUTPATIENT)
Dept: PAIN MEDICINE | Facility: CLINIC | Age: 80
End: 2020-01-31
Payer: MEDICARE

## 2020-01-31 VITALS
HEIGHT: 69 IN | DIASTOLIC BLOOD PRESSURE: 65 MMHG | BODY MASS INDEX: 30.51 KG/M2 | SYSTOLIC BLOOD PRESSURE: 97 MMHG | RESPIRATION RATE: 20 BRPM | WEIGHT: 206 LBS | HEART RATE: 83 BPM

## 2020-01-31 DIAGNOSIS — M54.16 LUMBAR RADICULOPATHY: Primary | ICD-10-CM

## 2020-01-31 DIAGNOSIS — M47.816 LUMBAR SPONDYLOSIS: ICD-10-CM

## 2020-01-31 DIAGNOSIS — M51.36 DDD (DEGENERATIVE DISC DISEASE), LUMBAR: ICD-10-CM

## 2020-01-31 PROCEDURE — 3074F PR MOST RECENT SYSTOLIC BLOOD PRESSURE < 130 MM HG: ICD-10-PCS | Mod: HCNC,CPTII,S$GLB, | Performed by: PHYSICIAN ASSISTANT

## 2020-01-31 PROCEDURE — 99999 PR PBB SHADOW E&M-EST. PATIENT-LVL IV: ICD-10-PCS | Mod: PBBFAC,HCNC,, | Performed by: PHYSICIAN ASSISTANT

## 2020-01-31 PROCEDURE — 3074F SYST BP LT 130 MM HG: CPT | Mod: HCNC,CPTII,S$GLB, | Performed by: PHYSICIAN ASSISTANT

## 2020-01-31 PROCEDURE — 3078F DIAST BP <80 MM HG: CPT | Mod: HCNC,CPTII,S$GLB, | Performed by: PHYSICIAN ASSISTANT

## 2020-01-31 PROCEDURE — 99214 PR OFFICE/OUTPT VISIT, EST, LEVL IV, 30-39 MIN: ICD-10-PCS | Mod: HCNC,S$GLB,, | Performed by: PHYSICIAN ASSISTANT

## 2020-01-31 PROCEDURE — 1125F PR PAIN SEVERITY QUANTIFIED, PAIN PRESENT: ICD-10-PCS | Mod: HCNC,S$GLB,, | Performed by: PHYSICIAN ASSISTANT

## 2020-01-31 PROCEDURE — 99999 PR PBB SHADOW E&M-EST. PATIENT-LVL IV: CPT | Mod: PBBFAC,HCNC,, | Performed by: PHYSICIAN ASSISTANT

## 2020-01-31 PROCEDURE — 1101F PT FALLS ASSESS-DOCD LE1/YR: CPT | Mod: HCNC,CPTII,S$GLB, | Performed by: PHYSICIAN ASSISTANT

## 2020-01-31 PROCEDURE — 1125F AMNT PAIN NOTED PAIN PRSNT: CPT | Mod: HCNC,S$GLB,, | Performed by: PHYSICIAN ASSISTANT

## 2020-01-31 PROCEDURE — 1101F PR PT FALLS ASSESS DOC 0-1 FALLS W/OUT INJ PAST YR: ICD-10-PCS | Mod: HCNC,CPTII,S$GLB, | Performed by: PHYSICIAN ASSISTANT

## 2020-01-31 PROCEDURE — 3078F PR MOST RECENT DIASTOLIC BLOOD PRESSURE < 80 MM HG: ICD-10-PCS | Mod: HCNC,CPTII,S$GLB, | Performed by: PHYSICIAN ASSISTANT

## 2020-01-31 PROCEDURE — 1159F PR MEDICATION LIST DOCUMENTED IN MEDICAL RECORD: ICD-10-PCS | Mod: HCNC,S$GLB,, | Performed by: PHYSICIAN ASSISTANT

## 2020-01-31 PROCEDURE — 1159F MED LIST DOCD IN RCRD: CPT | Mod: HCNC,S$GLB,, | Performed by: PHYSICIAN ASSISTANT

## 2020-01-31 PROCEDURE — 99214 OFFICE O/P EST MOD 30 MIN: CPT | Mod: HCNC,S$GLB,, | Performed by: PHYSICIAN ASSISTANT

## 2020-01-31 NOTE — PROGRESS NOTES
Chief Pain Complaint:  Chief Complaint   Patient presents with    Low-back Pain   low back pain     Interval History (1/31/2020): S/p L5/S1 IL XUAN on 1/6/2020 with limited pain relief for a few days.     Interval History (12/17/19): Patient was seen on 11/22/19. At that time he underwent L5/S1 IL XUAN.  The patient reports that he is/was unchanged following the procedure.  he reports some pain relief for a few days.  He is very active normally and would like to start dancing again.     Initial History of Present Illness:   This patient is a 79 y.o. male who presents today complaining of the above noted pain/s. The patient describes the pain as follows.  Mr. Ortiz is a new patient to clinic with complaints of back pain. Currently rates his pain 0/10 describes was seen aching sensation which is worse with bending and standing and has been somewhat improved with acupuncture.  His symptoms started many years ago he has tried several different types of therapy including physical therapy, chiropractor, acupuncture, heating pad and ice packs with varying degrees benefit.  He has undergone lumbar surgery in the past however is unsure 1 exact was perform the surgery.  There is no evidence of fusion on x-ray in all disc appeared to be intact. He has undergone medial branch blocks and radiofrequency ablation at an outside pain clinic in April 2019 however he reports that he has received 0 benefit from these procedures.  He has been using [employer] topical cream on lumbar spine which does provide some benefit.  He finds his symptoms are worse with activity such as standing for long periods to wash dishes and cleaning house while his symptoms are completely resolved with sitting and rest.  He denies having any rib radiating symptoms and denies having bowel bladder difficulty. He has used extra-strength Tylenol in the past with no benefit.  He has completed physical therapy in the past and in performs a pretty in depth  exercise routine on a daily basis.    Previous Therapy:  Medications:  Extra-strength Tylenol, Klonopin  Injections:  - series of 3 injections (what sounds like ESIs) about 30 years ago with relief  - Lumbar Medial Branch Blocks and Lumbar Radiofrequency Ablation with limited relief  - L5/S1 IL XUAN on 11/22/19 with some pain relief for a few days  - L5/S1 IL XUAN on 1/6/2020 with limited pain relief for a few days (although noticed medication didn't spread well after looking through images with Dr. Trivedi)  Surgeries:  Lumbar surgery   Physical Therapy: Completed in the Past    Past Surgical History:   Procedure Laterality Date    CORONARY ARTERY BYPASS GRAFT  1987    EPIDURAL STEROID INJECTION N/A 11/22/2019    Procedure: Lumbar L5/S1 IL XUAN;  Surgeon: Tacho Trivedi MD;  Location: [facility];  Service: Pain Management;  Laterality: N/A;    EPIDURAL STEROID INJECTION N/A 1/6/2020    Procedure: Lumbar L5/S1 IL XUAN;  Surgeon: Tacho Trivedi MD;  Location: [facility];  Service: Pain Management;  Laterality: N/A;    PROSTATE SURGERY      prostate radiation    SPINE SURGERY      fusion    TONSILLECTOMY             Imaging / Labs / Studies (reviewed on 1/31/2020):    Results for orders placed during the hospital encounter of 07/11/18   CT Lumbar Spine Without Contrast    Narrative COMPARISON:  Lumbar spine MRI performed on 06/08/2011 and lumbar spine radiographs on 08/08/2011  FINDINGS:  Since the prior MRI examination, development complete osseous fusion the L2 and L3 vertebral bodies.  Laminectomy/laminotomy deformities from L2 through L5.Minimal retrolisthesis at L3 with respect to L4 measuring approximately 4 mm.  At T12-L1 there is moderate degenerative disc disease with vacuum disc phenomenon.  Circumferential disc bulge with mild bilateral facet DJD that result in mild bilateral neural foraminal narrowing and mild spinal canal stenosis.  At L1-L2, there is no significant neural foraminal narrowing  or spinal canal stenosis.  At L2-L3, no significant neural foraminal narrowing or spinal canal stenosis.  At L3-L4, there is moderate degenerative disc disease with vacuum disc phenomenon and central disc osteophyte complex.  Indents facet DJD contributes to moderate bilateral neural foraminal narrowing.  At L4-5, there is severe degenerative disc disease with vacuum disc phenomenon.  Advanced facet DJD contributes to moderate left and severe right neural foraminal narrowing is.  At L5-S1, there is mild bilateral neural foraminal narrowing secondary to facet DJD.  Extensive postoperative stranding throughout the posterior.  Spinal tissues.  Ascites is demonstrated in the abdomen.    Impression Multilevel postoperative and degenerative changes as discussed in detail above.  All CT scans at this facility are performed  using dose modulation techniques as appropriate to performed exam including the following:  automated exposure control; adjustment of mA and/or kV according to the patients size (this includes techniques or standardized protocols for targeted exams where dose is matched to indication/reason for exam: i.e. extremities or head);  iterative reconstruction technique.     Results for orders placed in visit on 03/03/11   MRI Lumbar Spine Without Contrast    Narrative RESULTS: THIS STUDY DEMONSTRATES SEVERE DEGENERATIVE END PLATE CHANGES AT L4-5 AND L2-3.  THERE IS A LARGE ANTERIOR OSTEOPHYTE PROJECTING OFF THE END PLATES OF L3, L2, AND L4.  THERE ARE ALSO PROMINENT DISC BULGES AT L4-5, L3-4, AND L2-3.  THIS STUDY DEMONSTRATES MULTILEVEL FACET ARTHROPATHY.    IMPRESSION: PROMINENT DEGENERATIVE DISC DISEASE AS DESCRIBED IN THE ABOVE PARAGRAPH. THIS STUDY ALSO DEMONSTRATES PROMINENT RIGHT NEURAL FORAMINAL NARROWING AT L4-5 AND LEFT NEURAL FORAMINAL NARROWING AT L4-5.      Results for orders placed during the hospital encounter of 06/08/11   MRI Lumbar Spine W WO Cont    Narrative RESULTS:  Indication:     Back  pain patient's had extensive operative intervention   with laminectomy starting at L3 and extending to mid L4. There also   appears to be a right laminectomy at L4-L5. Multiple pedicle screws   apparently have been removed.  L1-L2 level unremarkable.  L2-L3 disc is narrowed there is mild spondylosis with minor facet   arthropathy present. There is some posterior paravertebral edema present   right greater than left extending from right facet lesion of L2-L3. There   is mild central stenosis present but the L2 nerve roots exit without   contact or distortion. There screw artifacts present in the pedicles at   L2 and L3-L4 and L5.  L3-L4 disc is narrowed there is mild spondylosis patient's had   laminectomy there is extensive para spinous muscular edema with fluid   along the left lamina and paraspinous region is also considerable loss of   fat plane within the paravertebral musculature of the spine from L1-L2   down to sacral region.  L4-L5 level is obliterated right laminectomy is present is extensive   edema and loss of fat planes within the paravertebral musculature and   around the posterior and lateral thecal sac.  The L5-S1 disc is intact there is facet arthropathy present.  Postcontrast study demonstrates diffuse enhancement of the  paravertebral   soft tissues starting at approximately the T12 and extending to the   sacral level.  There is diffuse enhancement of the L2-L3 disc with enhancing tissues of   the posterior paraspinous region and minor facet joint enhancement. There   is also enhancement of the posterior epidural region resulting in some   distortion of the dorsal lateral thecal sac, left greater than right.  The L3-L4 level demonstrates minimal central and left-sided enhancement   of the disc with extensive  enhancement of the paraspinous elements with   enhancement of the posterior and lateral epidural canal. No intradural   enhancement is noted. There is some enhancement around the right  residual   facet . A portion of the left facet complex is absent..  The L4-L5 level again demonstrates comparable diffuse paraspinous   enhancement and enhancement surrounding the thecal sac within the central   canal. There is enhancement of the bony elements including the pedicles   and facet complex.  L5-S1 demonstrate some  facet enhancement and posterior perithecal   enhancement  IMPRESSION:       Patient's had extensive operative intervention with removal of   pedicle screws at L2-L3 L4 and L5. There is extensive enhancement of the   operative bed in the posterior epidural region as well is some   enhancement of the scar tissue around the thecal sac. There Is no   intradural or intramedullary enhancement although there is clumping of   the cauda and enhancement of the L2-L3 and L4-L5 disc. There is also   small amount enhancement of the posterior aspect of the L3-L4 disc.   enhancement consistent with gliosis. The various levels of bone   enhancement including the facet complex at L3 and L4 as well as portions   of the pedicles involving L5 involving and adjacent to the screw tracks..     Results for orders placed during the hospital encounter of 07/11/18   CT Lumbar Spine Without Contrast    Narrative COMPARISON:  Lumbar spine MRI performed on 06/08/2011 and lumbar spine radiographs on 08/08/2011  FINDINGS:  Since the prior MRI examination, development complete osseous fusion the L2 and L3 vertebral bodies.  Laminectomy/laminotomy deformities from L2 through L5.Minimal retrolisthesis at L3 with respect to L4 measuring approximately 4 mm.  At T12-L1 there is moderate degenerative disc disease with vacuum disc phenomenon.  Circumferential disc bulge with mild bilateral facet DJD that result in mild bilateral neural foraminal narrowing and mild spinal canal stenosis.  At L1-L2, there is no significant neural foraminal narrowing or spinal canal stenosis.  At L2-L3, no significant neural foraminal narrowing or  spinal canal stenosis.  At L3-L4, there is moderate degenerative disc disease with vacuum disc phenomenon and central disc osteophyte complex.  Indents facet DJD contributes to moderate bilateral neural foraminal narrowing.  At L4-5, there is severe degenerative disc disease with vacuum disc phenomenon.  Advanced facet DJD contributes to moderate left and severe right neural foraminal narrowing is.  At L5-S1, there is mild bilateral neural foraminal narrowing secondary to facet DJD.  Extensive postoperative stranding throughout the posterior.  Spinal tissues.  Ascites is demonstrated in the abdomen.    Impression Multilevel postoperative and degenerative changes as discussed in detail above.  All CT scans at this facility are performed  using dose modulation techniques as appropriate to performed exam including the following:  automated exposure control; adjustment of mA and/or kV according to the patients size (this includes techniques or standardized protocols for targeted exams where dose is matched to indication/reason for exam: i.e. extremities or head);  iterative reconstruction technique.     Results for orders placed during the hospital encounter of 09/25/19   X-Ray Lumbar Spine Complete 5 View    Narrative COMPARISON:  05/30/2018  FINDINGS:  Scoliosis remains.  Vertebral body heights and alignment are stable.  Multilevel advanced degenerative disc height loss and osteophyte formation noted.  Multilevel facet arthropathy present more prevalent at the lower lumbar spine.  No acute osseous abnormality appreciated.  Aorta iliac atherosclerotic vascular calcifications noted.    Impression Similar appearance of the spine.  Correlate with MRI exam as clinically indicated.     Results for orders placed during the hospital encounter of 05/30/18   X-Ray Lumbar Spine AP And Lateral    Narrative COMPARISON:  08/08/2011  FINDINGS:  Mild-to-moderate dextroscoliosis of the lumbar spine noted.  There is suggestion of  "possible mild loss of vertebral body height at L5 which may potentially be projectional in nature and was not definitely seen on prior.  Age-indeterminate compression fracture not excluded.  Further characterization could be obtained with MRI as clinically warranted.  Moderate disc height loss from L2-3 through L3-4 appears unchanged.  Multilevel facet arthropathy suspected throughout the lumbar spine.  Posterior elements appear grossly intact. No acute fractures or subluxations are demonstrated.  Visualized osseous structures appear diffusely osteopenic.    Impression As above.     Results for orders placed during the hospital encounter of 08/26/14   X-Ray Cervical Spine AP And Lateral    Narrative Findings: Vertebral alignment is normal.  There is narrowing of the disk spaces and  anterior osteophyte formation C 3-7.  There are no compression fractures or acute abnormalities are seen.  Carotid calcifications are noted bilaterally.    Impression  Advanced degenerative change in the cervical spine.           Review of Systems:  Constitutional: Negative for fever.   Eyes: Negative for blurred vision.   Respiratory: Negative for cough and wheezing.    Cardiovascular: Negative for chest pain and orthopnea.   Gastrointestinal: Negative for constipation, diarrhea, nausea and vomiting.   Genitourinary: Negative for dysuria.   Musculoskeletal: Positive for back pain.   Skin: Negative for itching and rash.   Neurological: Negative for weakness.   Endo/Heme/Allergies: Does not bruise/bleed easily.         Physical Exam:  Vitals:  BP 97/65 (BP Location: Right arm, Patient Position: Sitting, BP Method: Medium (Automatic))   Pulse 83   Resp 20   Ht 5' 9" (1.753 m)   Wt 93.4 kg (206 lb)   BMI 30.42 kg/m²   (reviewed on 1/31/2020)    General: alert and oriented, in no apparent distress.  Gait: normal gait.  Skin: no rashes, no discoloration, no obvious lesions  HEENT: normocephalic, atraumatic. Pupils equal and " round.  Cardiovascular: no significant peripheral edema present.  Respiratory: without use of accessory muscles of respiration.    Musculoskeletal - Lumbar Spine:  - Inspection: healed scar   - Pain on flexion of lumbar spine: Absent   - Pain on extension of lumbar spine: Present  - Lumbar facet loading: Absent   - TTP over the lumbar facet joints: Present  - TTP over the lumbar paraspinals: Present  - TTP over the SI joints:  Absent   - TTP over GT bursa: Absent   - Straight Leg Raise: Negative    Neuro - Lower Extremities:  - RLE Strength:     >> 5/5 strength with right hip flexion/ extension    >> 5/5 strength with right knee flexion/ extension    >> 5/5 strength in right ankle with plantar and dorsiflexion  - LLE Strength:     >> 5/5 strength with left hip flexion/ extension    >> 5/5 strength with knee flexion extension on the left     >> 5/5 strength in left ankle with plantar and dorsiflexion  - Extremity Reflexes: Brisk and symmetric throughout  - Sensory: Sensation to light touch intact bilaterally      Psych:  Mood and affect is appropriate      Assessment  1. 79 y.o. year old patient with PMH of   Past Medical History:   Diagnosis Date    Abnormal PFT     Anemia     Arthritis     Atrial fibrillation 10/19/2017    Back pain     Congestive heart failure 3/5/2018    Coronary artery disease     Diabetes mellitus 01/2018     am 02/27/2018    Hyperlipemia     Hypertension     Myocardial infarction     Obesity     NASREEN (obstructive sleep apnea) 6/5/2018    Prostate cancer 2015    Tobacco dependence     Type 2 diabetes mellitus       presenting with pain located lumbar spine. Diagnoses include:    ICD-10-CM ICD-9-CM   1. Lumbar radiculopathy M54.16 724.4   2. DDD (degenerative disc disease), lumbar M51.36 722.52   3. Lumbar spondylosis M47.816 721.3      2. Pain Generators / Etiology: Lumbar Spondylosis and Lumbar Degenerative Disc Disease  3. Failed Meds (E- Effective, NE- Not Effective):   Tylenol-not effective  4. Physical Therapy - Completed in the Past  5. Psychological comorbidities - None  6. Anticoagulants / Antiplatelets: Aspirin 81mg and Plavix     Plan:  1. Interventional:   - S/p L5/S1 IL XUAN on 1/6/2020 with limited pain relief for a few days.   - Schedule caudal XUAN.  He had a series of 3 (what sounds like ESIs) in the past, which helped. Patient is taking ASA + Plavix; he will have to stop prior to procedure.  We should have recently obtained clearance from cardiology for 1st injection.  Patient will be instructed to stop all ASA products, NSAIDs, tumeric, fish oil, and Vitamin E.      2. Pharmacologic: We have given tramadol 50mg PRN (5 tabs) for severe pain.     3. Rehabilitative: Encouraged regular exercise.    4. Diagnostic: None for now.    5. Follow up: 4 weeks post-injection    - I discussed the risks, benefits, and alternatives to potential treatment options. All questions and concerns were fully addressed today in clinic. Dr. Trivedi was consulted regarding the patient plan and agrees.

## 2020-01-31 NOTE — H&P (VIEW-ONLY)
Chief Pain Complaint:  Chief Complaint   Patient presents with    Low-back Pain   low back pain     Interval History (1/31/2020): S/p L5/S1 IL XUAN on 1/6/2020 with limited pain relief for a few days.     Interval History (12/17/19): Patient was seen on 11/22/19. At that time he underwent L5/S1 IL XUAN.  The patient reports that he is/was unchanged following the procedure.  he reports some pain relief for a few days.  He is very active normally and would like to start dancing again.     Initial History of Present Illness:   This patient is a 79 y.o. male who presents today complaining of the above noted pain/s. The patient describes the pain as follows.  Mr. Ortiz is a new patient to clinic with complaints of back pain. Currently rates his pain 0/10 describes was seen aching sensation which is worse with bending and standing and has been somewhat improved with acupuncture.  His symptoms started many years ago he has tried several different types of therapy including physical therapy, chiropractor, acupuncture, heating pad and ice packs with varying degrees benefit.  He has undergone lumbar surgery in the past however is unsure 1 exact was perform the surgery.  There is no evidence of fusion on x-ray in all disc appeared to be intact. He has undergone medial branch blocks and radiofrequency ablation at an outside pain clinic in April 2019 however he reports that he has received 0 benefit from these procedures.  He has been using [employer] topical cream on lumbar spine which does provide some benefit.  He finds his symptoms are worse with activity such as standing for long periods to wash dishes and cleaning house while his symptoms are completely resolved with sitting and rest.  He denies having any rib radiating symptoms and denies having bowel bladder difficulty. He has used extra-strength Tylenol in the past with no benefit.  He has completed physical therapy in the past and in performs a pretty in depth  exercise routine on a daily basis.    Previous Therapy:  Medications:  Extra-strength Tylenol, Klonopin  Injections:  - series of 3 injections (what sounds like ESIs) about 30 years ago with relief  - Lumbar Medial Branch Blocks and Lumbar Radiofrequency Ablation with limited relief  - L5/S1 IL XUAN on 11/22/19 with some pain relief for a few days  - L5/S1 IL XUAN on 1/6/2020 with limited pain relief for a few days (although noticed medication didn't spread well after looking through images with Dr. Trivedi)  Surgeries:  Lumbar surgery   Physical Therapy: Completed in the Past    Past Surgical History:   Procedure Laterality Date    CORONARY ARTERY BYPASS GRAFT  1987    EPIDURAL STEROID INJECTION N/A 11/22/2019    Procedure: Lumbar L5/S1 IL XUAN;  Surgeon: Tacho Trivedi MD;  Location: [facility];  Service: Pain Management;  Laterality: N/A;    EPIDURAL STEROID INJECTION N/A 1/6/2020    Procedure: Lumbar L5/S1 IL XUAN;  Surgeon: Tacho Trivedi MD;  Location: [facility];  Service: Pain Management;  Laterality: N/A;    PROSTATE SURGERY      prostate radiation    SPINE SURGERY      fusion    TONSILLECTOMY             Imaging / Labs / Studies (reviewed on 1/31/2020):    Results for orders placed during the hospital encounter of 07/11/18   CT Lumbar Spine Without Contrast    Narrative COMPARISON:  Lumbar spine MRI performed on 06/08/2011 and lumbar spine radiographs on 08/08/2011  FINDINGS:  Since the prior MRI examination, development complete osseous fusion the L2 and L3 vertebral bodies.  Laminectomy/laminotomy deformities from L2 through L5.Minimal retrolisthesis at L3 with respect to L4 measuring approximately 4 mm.  At T12-L1 there is moderate degenerative disc disease with vacuum disc phenomenon.  Circumferential disc bulge with mild bilateral facet DJD that result in mild bilateral neural foraminal narrowing and mild spinal canal stenosis.  At L1-L2, there is no significant neural foraminal narrowing  or spinal canal stenosis.  At L2-L3, no significant neural foraminal narrowing or spinal canal stenosis.  At L3-L4, there is moderate degenerative disc disease with vacuum disc phenomenon and central disc osteophyte complex.  Indents facet DJD contributes to moderate bilateral neural foraminal narrowing.  At L4-5, there is severe degenerative disc disease with vacuum disc phenomenon.  Advanced facet DJD contributes to moderate left and severe right neural foraminal narrowing is.  At L5-S1, there is mild bilateral neural foraminal narrowing secondary to facet DJD.  Extensive postoperative stranding throughout the posterior.  Spinal tissues.  Ascites is demonstrated in the abdomen.    Impression Multilevel postoperative and degenerative changes as discussed in detail above.  All CT scans at this facility are performed  using dose modulation techniques as appropriate to performed exam including the following:  automated exposure control; adjustment of mA and/or kV according to the patients size (this includes techniques or standardized protocols for targeted exams where dose is matched to indication/reason for exam: i.e. extremities or head);  iterative reconstruction technique.     Results for orders placed in visit on 03/03/11   MRI Lumbar Spine Without Contrast    Narrative RESULTS: THIS STUDY DEMONSTRATES SEVERE DEGENERATIVE END PLATE CHANGES AT L4-5 AND L2-3.  THERE IS A LARGE ANTERIOR OSTEOPHYTE PROJECTING OFF THE END PLATES OF L3, L2, AND L4.  THERE ARE ALSO PROMINENT DISC BULGES AT L4-5, L3-4, AND L2-3.  THIS STUDY DEMONSTRATES MULTILEVEL FACET ARTHROPATHY.    IMPRESSION: PROMINENT DEGENERATIVE DISC DISEASE AS DESCRIBED IN THE ABOVE PARAGRAPH. THIS STUDY ALSO DEMONSTRATES PROMINENT RIGHT NEURAL FORAMINAL NARROWING AT L4-5 AND LEFT NEURAL FORAMINAL NARROWING AT L4-5.      Results for orders placed during the hospital encounter of 06/08/11   MRI Lumbar Spine W WO Cont    Narrative RESULTS:  Indication:     Back  pain patient's had extensive operative intervention   with laminectomy starting at L3 and extending to mid L4. There also   appears to be a right laminectomy at L4-L5. Multiple pedicle screws   apparently have been removed.  L1-L2 level unremarkable.  L2-L3 disc is narrowed there is mild spondylosis with minor facet   arthropathy present. There is some posterior paravertebral edema present   right greater than left extending from right facet lesion of L2-L3. There   is mild central stenosis present but the L2 nerve roots exit without   contact or distortion. There screw artifacts present in the pedicles at   L2 and L3-L4 and L5.  L3-L4 disc is narrowed there is mild spondylosis patient's had   laminectomy there is extensive para spinous muscular edema with fluid   along the left lamina and paraspinous region is also considerable loss of   fat plane within the paravertebral musculature of the spine from L1-L2   down to sacral region.  L4-L5 level is obliterated right laminectomy is present is extensive   edema and loss of fat planes within the paravertebral musculature and   around the posterior and lateral thecal sac.  The L5-S1 disc is intact there is facet arthropathy present.  Postcontrast study demonstrates diffuse enhancement of the  paravertebral   soft tissues starting at approximately the T12 and extending to the   sacral level.  There is diffuse enhancement of the L2-L3 disc with enhancing tissues of   the posterior paraspinous region and minor facet joint enhancement. There   is also enhancement of the posterior epidural region resulting in some   distortion of the dorsal lateral thecal sac, left greater than right.  The L3-L4 level demonstrates minimal central and left-sided enhancement   of the disc with extensive  enhancement of the paraspinous elements with   enhancement of the posterior and lateral epidural canal. No intradural   enhancement is noted. There is some enhancement around the right  residual   facet . A portion of the left facet complex is absent..  The L4-L5 level again demonstrates comparable diffuse paraspinous   enhancement and enhancement surrounding the thecal sac within the central   canal. There is enhancement of the bony elements including the pedicles   and facet complex.  L5-S1 demonstrate some  facet enhancement and posterior perithecal   enhancement  IMPRESSION:       Patient's had extensive operative intervention with removal of   pedicle screws at L2-L3 L4 and L5. There is extensive enhancement of the   operative bed in the posterior epidural region as well is some   enhancement of the scar tissue around the thecal sac. There Is no   intradural or intramedullary enhancement although there is clumping of   the cauda and enhancement of the L2-L3 and L4-L5 disc. There is also   small amount enhancement of the posterior aspect of the L3-L4 disc.   enhancement consistent with gliosis. The various levels of bone   enhancement including the facet complex at L3 and L4 as well as portions   of the pedicles involving L5 involving and adjacent to the screw tracks..     Results for orders placed during the hospital encounter of 07/11/18   CT Lumbar Spine Without Contrast    Narrative COMPARISON:  Lumbar spine MRI performed on 06/08/2011 and lumbar spine radiographs on 08/08/2011  FINDINGS:  Since the prior MRI examination, development complete osseous fusion the L2 and L3 vertebral bodies.  Laminectomy/laminotomy deformities from L2 through L5.Minimal retrolisthesis at L3 with respect to L4 measuring approximately 4 mm.  At T12-L1 there is moderate degenerative disc disease with vacuum disc phenomenon.  Circumferential disc bulge with mild bilateral facet DJD that result in mild bilateral neural foraminal narrowing and mild spinal canal stenosis.  At L1-L2, there is no significant neural foraminal narrowing or spinal canal stenosis.  At L2-L3, no significant neural foraminal narrowing or  spinal canal stenosis.  At L3-L4, there is moderate degenerative disc disease with vacuum disc phenomenon and central disc osteophyte complex.  Indents facet DJD contributes to moderate bilateral neural foraminal narrowing.  At L4-5, there is severe degenerative disc disease with vacuum disc phenomenon.  Advanced facet DJD contributes to moderate left and severe right neural foraminal narrowing is.  At L5-S1, there is mild bilateral neural foraminal narrowing secondary to facet DJD.  Extensive postoperative stranding throughout the posterior.  Spinal tissues.  Ascites is demonstrated in the abdomen.    Impression Multilevel postoperative and degenerative changes as discussed in detail above.  All CT scans at this facility are performed  using dose modulation techniques as appropriate to performed exam including the following:  automated exposure control; adjustment of mA and/or kV according to the patients size (this includes techniques or standardized protocols for targeted exams where dose is matched to indication/reason for exam: i.e. extremities or head);  iterative reconstruction technique.     Results for orders placed during the hospital encounter of 09/25/19   X-Ray Lumbar Spine Complete 5 View    Narrative COMPARISON:  05/30/2018  FINDINGS:  Scoliosis remains.  Vertebral body heights and alignment are stable.  Multilevel advanced degenerative disc height loss and osteophyte formation noted.  Multilevel facet arthropathy present more prevalent at the lower lumbar spine.  No acute osseous abnormality appreciated.  Aorta iliac atherosclerotic vascular calcifications noted.    Impression Similar appearance of the spine.  Correlate with MRI exam as clinically indicated.     Results for orders placed during the hospital encounter of 05/30/18   X-Ray Lumbar Spine AP And Lateral    Narrative COMPARISON:  08/08/2011  FINDINGS:  Mild-to-moderate dextroscoliosis of the lumbar spine noted.  There is suggestion of  "possible mild loss of vertebral body height at L5 which may potentially be projectional in nature and was not definitely seen on prior.  Age-indeterminate compression fracture not excluded.  Further characterization could be obtained with MRI as clinically warranted.  Moderate disc height loss from L2-3 through L3-4 appears unchanged.  Multilevel facet arthropathy suspected throughout the lumbar spine.  Posterior elements appear grossly intact. No acute fractures or subluxations are demonstrated.  Visualized osseous structures appear diffusely osteopenic.    Impression As above.     Results for orders placed during the hospital encounter of 08/26/14   X-Ray Cervical Spine AP And Lateral    Narrative Findings: Vertebral alignment is normal.  There is narrowing of the disk spaces and  anterior osteophyte formation C 3-7.  There are no compression fractures or acute abnormalities are seen.  Carotid calcifications are noted bilaterally.    Impression  Advanced degenerative change in the cervical spine.           Review of Systems:  Constitutional: Negative for fever.   Eyes: Negative for blurred vision.   Respiratory: Negative for cough and wheezing.    Cardiovascular: Negative for chest pain and orthopnea.   Gastrointestinal: Negative for constipation, diarrhea, nausea and vomiting.   Genitourinary: Negative for dysuria.   Musculoskeletal: Positive for back pain.   Skin: Negative for itching and rash.   Neurological: Negative for weakness.   Endo/Heme/Allergies: Does not bruise/bleed easily.         Physical Exam:  Vitals:  BP 97/65 (BP Location: Right arm, Patient Position: Sitting, BP Method: Medium (Automatic))   Pulse 83   Resp 20   Ht 5' 9" (1.753 m)   Wt 93.4 kg (206 lb)   BMI 30.42 kg/m²   (reviewed on 1/31/2020)    General: alert and oriented, in no apparent distress.  Gait: normal gait.  Skin: no rashes, no discoloration, no obvious lesions  HEENT: normocephalic, atraumatic. Pupils equal and " round.  Cardiovascular: no significant peripheral edema present.  Respiratory: without use of accessory muscles of respiration.    Musculoskeletal - Lumbar Spine:  - Inspection: healed scar   - Pain on flexion of lumbar spine: Absent   - Pain on extension of lumbar spine: Present  - Lumbar facet loading: Absent   - TTP over the lumbar facet joints: Present  - TTP over the lumbar paraspinals: Present  - TTP over the SI joints:  Absent   - TTP over GT bursa: Absent   - Straight Leg Raise: Negative    Neuro - Lower Extremities:  - RLE Strength:     >> 5/5 strength with right hip flexion/ extension    >> 5/5 strength with right knee flexion/ extension    >> 5/5 strength in right ankle with plantar and dorsiflexion  - LLE Strength:     >> 5/5 strength with left hip flexion/ extension    >> 5/5 strength with knee flexion extension on the left     >> 5/5 strength in left ankle with plantar and dorsiflexion  - Extremity Reflexes: Brisk and symmetric throughout  - Sensory: Sensation to light touch intact bilaterally      Psych:  Mood and affect is appropriate      Assessment  1. 79 y.o. year old patient with PMH of   Past Medical History:   Diagnosis Date    Abnormal PFT     Anemia     Arthritis     Atrial fibrillation 10/19/2017    Back pain     Congestive heart failure 3/5/2018    Coronary artery disease     Diabetes mellitus 01/2018     am 02/27/2018    Hyperlipemia     Hypertension     Myocardial infarction     Obesity     NASREEN (obstructive sleep apnea) 6/5/2018    Prostate cancer 2015    Tobacco dependence     Type 2 diabetes mellitus       presenting with pain located lumbar spine. Diagnoses include:    ICD-10-CM ICD-9-CM   1. Lumbar radiculopathy M54.16 724.4   2. DDD (degenerative disc disease), lumbar M51.36 722.52   3. Lumbar spondylosis M47.816 721.3      2. Pain Generators / Etiology: Lumbar Spondylosis and Lumbar Degenerative Disc Disease  3. Failed Meds (E- Effective, NE- Not Effective):   Tylenol-not effective  4. Physical Therapy - Completed in the Past  5. Psychological comorbidities - None  6. Anticoagulants / Antiplatelets: Aspirin 81mg and Plavix     Plan:  1. Interventional:   - S/p L5/S1 IL XUAN on 1/6/2020 with limited pain relief for a few days.   - Schedule caudal XUAN.  He had a series of 3 (what sounds like ESIs) in the past, which helped. Patient is taking ASA + Plavix; he will have to stop prior to procedure.  We should have recently obtained clearance from cardiology for 1st injection.  Patient will be instructed to stop all ASA products, NSAIDs, tumeric, fish oil, and Vitamin E.      2. Pharmacologic: We have given tramadol 50mg PRN (5 tabs) for severe pain.     3. Rehabilitative: Encouraged regular exercise.    4. Diagnostic: None for now.    5. Follow up: 4 weeks post-injection    - I discussed the risks, benefits, and alternatives to potential treatment options. All questions and concerns were fully addressed today in clinic. Dr. Trivedi was consulted regarding the patient plan and agrees.

## 2020-02-03 NOTE — PRE-PROCEDURE INSTRUCTIONS
Spoke with  patient     regarding procedure scheduled on 2/10/2020  Arrival time 0950  Has patient been sick with fever or on antibiotics within the last 7 days?no  Has patient received a vaccination within the last 7 days? no  Has the patient stopped all medications as directed? Yes, last dose of aspirin, plavix, vitamin D, fish oil, multivitamin all on 2/2/2020  Does patient have a pacemaker and or defibrillator? Yes, pacemaker only  Does the patient have a ride to and from procedure and someone reliable to remain with patient? Yes, wife laila  Is the patient diabetic? no  Does the patient have sleep apnea? Or use O2 at home? Yes, no  Is the patient receiving sedation? yes  Is the patient instructed to remain NPO beginning at midnight the night before their procedure? yes  Procedure location confirmed with patient? yes  Travel screening: negative

## 2020-02-10 ENCOUNTER — HOSPITAL ENCOUNTER (OUTPATIENT)
Facility: HOSPITAL | Age: 80
Discharge: HOME | End: 2020-02-10
Attending: PHYSICAL MEDICINE & REHABILITATION | Admitting: PHYSICAL MEDICINE & REHABILITATION
Payer: MEDICARE

## 2020-02-10 VITALS
BODY MASS INDEX: 30.42 KG/M2 | WEIGHT: 205.38 LBS | HEART RATE: 61 BPM | OXYGEN SATURATION: 98 % | DIASTOLIC BLOOD PRESSURE: 64 MMHG | RESPIRATION RATE: 17 BRPM | TEMPERATURE: 98 F | HEIGHT: 69 IN | SYSTOLIC BLOOD PRESSURE: 114 MMHG

## 2020-02-10 DIAGNOSIS — M54.16 LUMBAR RADICULOPATHY: ICD-10-CM

## 2020-02-10 LAB — POCT GLUCOSE: 111 MG/DL (ref 70–110)

## 2020-02-10 PROCEDURE — 25000003 PHARM REV CODE 250: Mod: HCNC | Performed by: PHYSICAL MEDICINE & REHABILITATION

## 2020-02-10 PROCEDURE — 62323 NJX INTERLAMINAR LMBR/SAC: CPT | Mod: HCNC,,, | Performed by: PHYSICAL MEDICINE & REHABILITATION

## 2020-02-10 PROCEDURE — 63600175 PHARM REV CODE 636 W HCPCS: Mod: HCNC | Performed by: PHYSICAL MEDICINE & REHABILITATION

## 2020-02-10 PROCEDURE — 99152 MOD SED SAME PHYS/QHP 5/>YRS: CPT | Mod: HCNC | Performed by: PHYSICAL MEDICINE & REHABILITATION

## 2020-02-10 PROCEDURE — 62323 NJX INTERLAMINAR LMBR/SAC: CPT | Mod: HCNC | Performed by: PHYSICAL MEDICINE & REHABILITATION

## 2020-02-10 PROCEDURE — 25500020 PHARM REV CODE 255: Mod: HCNC | Performed by: PHYSICAL MEDICINE & REHABILITATION

## 2020-02-10 PROCEDURE — 62323 PR INJ LUMBAR/SACRAL, W/IMAGING GUIDANCE: ICD-10-PCS | Mod: HCNC,,, | Performed by: PHYSICAL MEDICINE & REHABILITATION

## 2020-02-10 RX ORDER — MIDAZOLAM HYDROCHLORIDE 1 MG/ML
INJECTION, SOLUTION INTRAMUSCULAR; INTRAVENOUS
Status: DISCONTINUED | OUTPATIENT
Start: 2020-02-10 | End: 2020-02-10 | Stop reason: HOSPADM

## 2020-02-10 RX ORDER — ONDANSETRON 2 MG/ML
4 INJECTION INTRAMUSCULAR; INTRAVENOUS ONCE AS NEEDED
Status: DISCONTINUED | OUTPATIENT
Start: 2020-02-10 | End: 2020-12-20

## 2020-02-10 RX ORDER — FENTANYL CITRATE 50 UG/ML
INJECTION, SOLUTION INTRAMUSCULAR; INTRAVENOUS
Status: DISCONTINUED | OUTPATIENT
Start: 2020-02-10 | End: 2020-02-10 | Stop reason: HOSPADM

## 2020-02-10 RX ORDER — METHYLPREDNISOLONE ACETATE 40 MG/ML
INJECTION, SUSPENSION INTRA-ARTICULAR; INTRALESIONAL; INTRAMUSCULAR; SOFT TISSUE
Status: DISCONTINUED | OUTPATIENT
Start: 2020-02-10 | End: 2020-02-10 | Stop reason: HOSPADM

## 2020-02-10 RX ORDER — BUPIVACAINE HYDROCHLORIDE 2.5 MG/ML
INJECTION, SOLUTION EPIDURAL; INFILTRATION; INTRACAUDAL
Status: DISCONTINUED | OUTPATIENT
Start: 2020-02-10 | End: 2020-02-10 | Stop reason: HOSPADM

## 2020-02-10 NOTE — OP NOTE
Patient Name: Jake Ortiz  MRN: [id number]    INFORMED CONSENT: The procedure, risks, benefits and options were discussed with patient. There are no contraindications to the procedure. The patient expressed understanding and agreed to proceed. The personnel performing the procedure was discussed. I verify that I personally obtained Jake's consent prior to the start of the procedure and the signed consent can be found on the patient's chart.    Procedure Date: 02/10/2020    Surgeon: Tacho Triveid MD  Assistants: None    Sedation:Conscious sedation provided by M.D    The patient was monitored with continuous pulse oximetry, EKG, and intermittent blood pressure monitors.  The patient was hemodynamically stable throughout the entire process was responsive to voice, and breathing spontaneously.  Supplemental O2 was provided at 2L/min via nasal cannula.  Patient was comfortable for the duration of the procedure. (See nurse documentation and case log for sedation time)    There was a total of 2mg IV Midazolam and 25mcg Fentanyl titrated for the procedure      Anesthesia: Topical    Pre Procedure diagnosis: Lumbar radiculopathy [M54.16]  1. Lumbar radiculopathy      Post-Procedure diagnosis: SAME      PROCEDURE: CAUDAL XUAN        DESCRIPTION OF PROCEDURE: After fully informed written consent was obtained, the patient was brought to the procedure room and placed in the prone position. Monitoring of pulse oximetry, heart rate, and blood pressure was done pre-procedure, during the procedure, and post-procedure. The sacrococcygeal area was prepped with  chlorhexidine  and draped in a sterile fashion. After performing time out. With a 25-gauge,  1.5 inch needle, lidocaine 1% was injected subcutaneously over the entry site. The sacrum and sacral cornua were visualized in an AP view.  A 22-gauge, 3 1/2 inch spinal needle was inserted and advanced into the sacral hiatus under fluoroscopic guidance.   After the needle passed  through the sacrococcygeal ligament, the needle angle was lowered and the needle was advanced 1 cm. There was no evidence of paresthesias throughout needle placement. After negative aspiration for blood and cerebrospinal fluid, then a catheter was threaded through the needle into the epidural space using live fluroscopy.  Once in position, 1 ml of nonionic contrast agent was slowly injected. Confirmation of spread of contrast agent within the caudal epidural space was made with fluoroscopic imaging in the AP and lateral views.  Subsequently, a preparation of 1mL 0.25% bupivacaine + 40mg depomedrol + 8mL preservative free saline was slowly administered without resistance while slowly removing the catheter. There was no pain on injection. The needle was removed and bleeding was nil. The patient tolerated the procedure well and was transferred to the recovery room in stable conditionl and there was no evidence of procedural complications. A sterile dressing was applied. No specimens collected. Jake was taken back to the recovery room for further observation.     Blood Loss: Nill  Specimen: None        Tacho Trivedi MD

## 2020-02-10 NOTE — DISCHARGE INSTRUCTIONS

## 2020-02-10 NOTE — INTERVAL H&P NOTE
The patient has been examined and the H&P has been reviewed:    I concur with the findings and no changes have occurred since H&P was written.    Anesthesia/Surgery risks, benefits and alternative options discussed and understood by patient/family.          Active Hospital Problems    Diagnosis  POA    Lumbar radiculopathy [M54.16]  Yes      Resolved Hospital Problems   No resolved problems to display.       Plan:  Proceed with caudal XUAN    Tacho Trivedi MD  Interventional Pain Medicine  [facility] [place]

## 2020-02-10 NOTE — DISCHARGE SUMMARY
Discharge Note  Short Stay      SUMMARY     Admit Date: 2/10/2020    Attending Physician: Tacho Trivedi MD        Discharge Physician: Tacho Trivedi MD        Discharge Date: 2/10/2020 10:57 AM    Procedure(s) (LRB):  Caudal XUAN (N/A)    Final Diagnosis: Lumbar radiculopathy [M54.16]    Disposition: Home or self care    Patient Instructions:   Current Discharge Medication List      CONTINUE these medications which have NOT CHANGED    Details   atorvastatin (LIPITOR) 80 MG tablet TK 1 T PO D  Refills: 3      ENTRESTO 24-26 mg per tablet       furosemide (LASIX) 20 MG tablet Take 1 tablet (20 mg total) by mouth 2 (two) times daily.  Qty: 60 tablet, Refills: 5      levocetirizine (XYZAL) 5 MG tablet TAKE 1 TABLET EVERY EVENING  Qty: 90 tablet, Refills: 0    Associated Diagnoses: Allergic rhinitis, unspecified seasonality, unspecified trigger      spironolactone (ALDACTONE) 50 MG tablet TK 1 T PO D  Refills: 6      acetaminophen (TYLENOL) 500 MG tablet Take 500 mg by mouth every 6 (six) hours as needed for Pain.      aspirin (ECOTRIN) 81 MG EC tablet Take 81 mg by mouth.      blood sugar diagnostic Strp Check blood glucose levels daily in the AM fasting and 1-2 times more daily.  Qty: 300 strip, Refills: 3    Associated Diagnoses: Diabetes mellitus type 2 in obese      blood-glucose meter kit Use as instructed  Qty: 1 each, Refills: 0    Associated Diagnoses: Diabetes mellitus type 2 in obese      cholecalciferol, vitamin D3, (VITAMIN D3) 1,000 unit capsule Take 5,000 Units by mouth once daily.      clonazePAM (KLONOPIN) 1 MG tablet TK 1 T PO HS  Refills: 5      clopidogrel (PLAVIX) 75 mg tablet Take 75 mg by mouth once daily.      fish oil-omega-3 fatty acids 300-1,000 mg capsule Take 1 capsule by mouth once daily.      fluticasone (FLONASE) 50 mcg/actuation nasal spray 2 sprays (100 mcg total) by Each Nare route once daily.  Qty: 48 g, Refills: 0    Associated Diagnoses: Allergic rhinitis, unspecified  seasonality, unspecified trigger      lancets Misc Check blood glucose levels daily in the AM fasting and 1-2 times more daily.  Qty: 300 each, Refills: 3    Associated Diagnoses: Diabetes mellitus type 2 in obese      multivitamin capsule Take 1 capsule by mouth once daily.      nitroGLYCERIN (NITROSTAT) 0.3 MG SL tablet PLACE 1 TABLET UNDER THE TONGUE EVERY 5 MINUTES AS NEEDED FOR CHEST PAIN AS DIRECTED  Refills: 0                 Discharge Diagnosis: Lumbar radiculopathy [M54.16]  Condition on Discharge: Stable with no complications to procedure   Diet on Discharge: Same as before.  Activity: as per instruction sheet.  Discharge to: Home with a responsible adult.  Follow up: 2-4 weeks       Please call the office if you experience any weakness or loss of sensation, fever > 101.5, pain uncontrolled with oral medications, persistent nausea/vomiting/or diarrhea, redness or drainage from the incisions, or any other worrisome concerns. If physician on call was not reached or could not communicate with our office for any reason please go to the nearest emergency department

## 2020-02-10 NOTE — PLAN OF CARE
Pt discharged home, awake, alert, oriented x's 4, denies any pain, no apparent distress noted. All questions and concerns addressed and answered, verbalizes understanding of discharge process, pt meets discharge criteria and is being discharged to car via wheelchair.

## 2020-03-10 ENCOUNTER — OFFICE VISIT (OUTPATIENT)
Dept: PAIN MEDICINE | Facility: CLINIC | Age: 80
End: 2020-03-10
Payer: MEDICARE

## 2020-03-10 VITALS
HEIGHT: 69 IN | SYSTOLIC BLOOD PRESSURE: 123 MMHG | BODY MASS INDEX: 30.36 KG/M2 | HEART RATE: 73 BPM | RESPIRATION RATE: 18 BRPM | DIASTOLIC BLOOD PRESSURE: 71 MMHG | WEIGHT: 205 LBS

## 2020-03-10 DIAGNOSIS — M51.36 DDD (DEGENERATIVE DISC DISEASE), LUMBAR: ICD-10-CM

## 2020-03-10 DIAGNOSIS — M54.16 LUMBAR RADICULOPATHY: Primary | ICD-10-CM

## 2020-03-10 DIAGNOSIS — M47.816 LUMBAR SPONDYLOSIS: ICD-10-CM

## 2020-03-10 PROCEDURE — 1126F PR PAIN SEVERITY QUANTIFIED, NO PAIN PRESENT: ICD-10-PCS | Mod: HCNC,S$GLB,, | Performed by: PHYSICIAN ASSISTANT

## 2020-03-10 PROCEDURE — 1101F PT FALLS ASSESS-DOCD LE1/YR: CPT | Mod: HCNC,CPTII,S$GLB, | Performed by: PHYSICIAN ASSISTANT

## 2020-03-10 PROCEDURE — 1101F PR PT FALLS ASSESS DOC 0-1 FALLS W/OUT INJ PAST YR: ICD-10-PCS | Mod: HCNC,CPTII,S$GLB, | Performed by: PHYSICIAN ASSISTANT

## 2020-03-10 PROCEDURE — 3078F PR MOST RECENT DIASTOLIC BLOOD PRESSURE < 80 MM HG: ICD-10-PCS | Mod: HCNC,CPTII,S$GLB, | Performed by: PHYSICIAN ASSISTANT

## 2020-03-10 PROCEDURE — 1126F AMNT PAIN NOTED NONE PRSNT: CPT | Mod: HCNC,S$GLB,, | Performed by: PHYSICIAN ASSISTANT

## 2020-03-10 PROCEDURE — 99214 OFFICE O/P EST MOD 30 MIN: CPT | Mod: HCNC,S$GLB,, | Performed by: PHYSICIAN ASSISTANT

## 2020-03-10 PROCEDURE — 3074F SYST BP LT 130 MM HG: CPT | Mod: HCNC,CPTII,S$GLB, | Performed by: PHYSICIAN ASSISTANT

## 2020-03-10 PROCEDURE — 99999 PR PBB SHADOW E&M-EST. PATIENT-LVL V: ICD-10-PCS | Mod: PBBFAC,HCNC,, | Performed by: PHYSICIAN ASSISTANT

## 2020-03-10 PROCEDURE — 3074F PR MOST RECENT SYSTOLIC BLOOD PRESSURE < 130 MM HG: ICD-10-PCS | Mod: HCNC,CPTII,S$GLB, | Performed by: PHYSICIAN ASSISTANT

## 2020-03-10 PROCEDURE — 1159F PR MEDICATION LIST DOCUMENTED IN MEDICAL RECORD: ICD-10-PCS | Mod: HCNC,S$GLB,, | Performed by: PHYSICIAN ASSISTANT

## 2020-03-10 PROCEDURE — 99214 PR OFFICE/OUTPT VISIT, EST, LEVL IV, 30-39 MIN: ICD-10-PCS | Mod: HCNC,S$GLB,, | Performed by: PHYSICIAN ASSISTANT

## 2020-03-10 PROCEDURE — 1159F MED LIST DOCD IN RCRD: CPT | Mod: HCNC,S$GLB,, | Performed by: PHYSICIAN ASSISTANT

## 2020-03-10 PROCEDURE — 3078F DIAST BP <80 MM HG: CPT | Mod: HCNC,CPTII,S$GLB, | Performed by: PHYSICIAN ASSISTANT

## 2020-03-10 PROCEDURE — 99999 PR PBB SHADOW E&M-EST. PATIENT-LVL V: CPT | Mod: PBBFAC,HCNC,, | Performed by: PHYSICIAN ASSISTANT

## 2020-03-10 RX ORDER — GABAPENTIN 300 MG/1
CAPSULE ORAL
Qty: 60 CAPSULE | Refills: 0 | Status: SHIPPED | OUTPATIENT
Start: 2020-03-10 | End: 2020-04-24 | Stop reason: SDUPTHER

## 2020-03-10 NOTE — PATIENT INSTRUCTIONS
- Tylenol 650mg up to 4x per day as needed for pain. No more than 3000 mg per day  - Start gabapentin with instructions.  If causes too much drowsiness, decrease back to 1 cap per night.

## 2020-03-10 NOTE — PROGRESS NOTES
Chief Pain Complaint:  Chief Complaint   Patient presents with    Low-back Pain   leg pain    Interval History (3/10/2020): Patient was seen on 2/10/20. At that time he underwent caudal XUAN.  The patient reports that he is/was unchanged following the procedure.  he reports 15% pain relief.  The changes lasted 2 days.      Interval History (1/31/2020): S/p L5/S1 IL XUAN on 1/6/2020 with limited pain relief for a few days.     Interval History (12/17/19): Patient was seen on 11/22/19. At that time he underwent L5/S1 IL XUAN.  The patient reports that he is/was unchanged following the procedure.  he reports some pain relief for a few days.  He is very active normally and would like to start dancing again.     Initial History of Present Illness:   This patient is a 79 y.o. male who presents today complaining of the above noted pain/s. The patient describes the pain as follows.  Mr. Ortiz is a new patient to clinic with complaints of back pain. Currently rates his pain 0/10 describes was seen aching sensation which is worse with bending and standing and has been somewhat improved with acupuncture.  His symptoms started many years ago he has tried several different types of therapy including physical therapy, chiropractor, acupuncture, heating pad and ice packs with varying degrees benefit.  He has undergone lumbar surgery in the past however is unsure 1 exact was perform the surgery.  There is no evidence of fusion on x-ray in all disc appeared to be intact. He has undergone medial branch blocks and radiofrequency ablation at an outside pain clinic in April 2019 however he reports that he has received 0 benefit from these procedures.  He has been using [employer] topical cream on lumbar spine which does provide some benefit.  He finds his symptoms are worse with activity such as standing for long periods to wash dishes and cleaning house while his symptoms are completely resolved with sitting and rest.  He denies  having any rib radiating symptoms and denies having bowel bladder difficulty. He has used extra-strength Tylenol in the past with no benefit.  He has completed physical therapy in the past and in performs a pretty in depth exercise routine on a daily basis.    Previous Therapy:  Medications:  Extra-strength Tylenol, Klonopin  Injections:  - series of 3 injections (what sounds like ESIs) about 30 years ago with relief  - Lumbar Medial Branch Blocks and Lumbar Radiofrequency Ablation with limited relief  - L5/S1 IL XUAN on 11/22/19 with some pain relief for a few days  - L5/S1 IL XUAN on 1/6/2020 with limited pain relief for a few days (although noticed medication didn't spread well after looking through images with Dr. Trivedi)  - caudal XUAN on 2/10/20 with 15% pain relief x 2 days  Surgeries:  Lumbar surgery with Dr. Powers with complications with staph infection causing 2 subsequent surgeries   Physical Therapy: Completed in the Past    Past Surgical History:   Procedure Laterality Date    CORONARY ARTERY BYPASS GRAFT  1987    EPIDURAL STEROID INJECTION N/A 11/22/2019    Procedure: Lumbar L5/S1 IL XUAN;  Surgeon: Tacho Trivedi MD;  Location: [facility];  Service: Pain Management;  Laterality: N/A;    EPIDURAL STEROID INJECTION N/A 1/6/2020    Procedure: Lumbar L5/S1 IL XUAN;  Surgeon: Tacho Trivedi MD;  [facility];  Service: Pain Management;  Laterality: N/A;    EPIDURAL STEROID INJECTION N/A 2/10/2020    Procedure: Caudal XUAN;  Surgeon: Tacho Trivedi MD;  Location: [facility];  Service: Pain Management;  Laterality: N/A;    PROSTATE SURGERY      prostate radiation    SPINE SURGERY      fusion    TONSILLECTOMY             Imaging / Labs / Studies (reviewed on 3/10/2020):    Results for orders placed during the hospital encounter of 07/11/18   CT Lumbar Spine Without Contrast    Narrative COMPARISON:  Lumbar spine MRI performed on 06/08/2011 and lumbar spine radiographs on  08/08/2011  FINDINGS:  Since the prior MRI examination, development complete osseous fusion the L2 and L3 vertebral bodies.  Laminectomy/laminotomy deformities from L2 through L5.Minimal retrolisthesis at L3 with respect to L4 measuring approximately 4 mm.  At T12-L1 there is moderate degenerative disc disease with vacuum disc phenomenon.  Circumferential disc bulge with mild bilateral facet DJD that result in mild bilateral neural foraminal narrowing and mild spinal canal stenosis.  At L1-L2, there is no significant neural foraminal narrowing or spinal canal stenosis.  At L2-L3, no significant neural foraminal narrowing or spinal canal stenosis.  At L3-L4, there is moderate degenerative disc disease with vacuum disc phenomenon and central disc osteophyte complex.  Indents facet DJD contributes to moderate bilateral neural foraminal narrowing.  At L4-5, there is severe degenerative disc disease with vacuum disc phenomenon.  Advanced facet DJD contributes to moderate left and severe right neural foraminal narrowing is.  At L5-S1, there is mild bilateral neural foraminal narrowing secondary to facet DJD.  Extensive postoperative stranding throughout the posterior.  Spinal tissues.  Ascites is demonstrated in the abdomen.    Impression Multilevel postoperative and degenerative changes as discussed in detail above.  All CT scans at this facility are performed  using dose modulation techniques as appropriate to performed exam including the following:  automated exposure control; adjustment of mA and/or kV according to the patients size (this includes techniques or standardized protocols for targeted exams where dose is matched to indication/reason for exam: i.e. extremities or head);  iterative reconstruction technique.     Results for orders placed in visit on 03/03/11   MRI Lumbar Spine Without Contrast    Narrative RESULTS: THIS STUDY DEMONSTRATES SEVERE DEGENERATIVE END PLATE CHANGES AT L4-5 AND L2-3.  THERE IS A  LARGE ANTERIOR OSTEOPHYTE PROJECTING OFF THE END PLATES OF L3, L2, AND L4.  THERE ARE ALSO PROMINENT DISC BULGES AT L4-5, L3-4, AND L2-3.  THIS STUDY DEMONSTRATES MULTILEVEL FACET ARTHROPATHY.    IMPRESSION: PROMINENT DEGENERATIVE DISC DISEASE AS DESCRIBED IN THE ABOVE PARAGRAPH. THIS STUDY ALSO DEMONSTRATES PROMINENT RIGHT NEURAL FORAMINAL NARROWING AT L4-5 AND LEFT NEURAL FORAMINAL NARROWING AT L4-5.      Results for orders placed during the hospital encounter of 06/08/11   MRI Lumbar Spine W WO Cont    Narrative RESULTS:  Indication:     Back pain patient's had extensive operative intervention   with laminectomy starting at L3 and extending to mid L4. There also   appears to be a right laminectomy at L4-L5. Multiple pedicle screws   apparently have been removed.  L1-L2 level unremarkable.  L2-L3 disc is narrowed there is mild spondylosis with minor facet   arthropathy present. There is some posterior paravertebral edema present   right greater than left extending from right facet lesion of L2-L3. There   is mild central stenosis present but the L2 nerve roots exit without   contact or distortion. There screw artifacts present in the pedicles at   L2 and L3-L4 and L5.  L3-L4 disc is narrowed there is mild spondylosis patient's had   laminectomy there is extensive para spinous muscular edema with fluid   along the left lamina and paraspinous region is also considerable loss of   fat plane within the paravertebral musculature of the spine from L1-L2   down to sacral region.  L4-L5 level is obliterated right laminectomy is present is extensive   edema and loss of fat planes within the paravertebral musculature and   around the posterior and lateral thecal sac.  The L5-S1 disc is intact there is facet arthropathy present.  Postcontrast study demonstrates diffuse enhancement of the  paravertebral   soft tissues starting at approximately the T12 and extending to the   sacral level.  There is diffuse enhancement of the  L2-L3 disc with enhancing tissues of   the posterior paraspinous region and minor facet joint enhancement. There   is also enhancement of the posterior epidural region resulting in some   distortion of the dorsal lateral thecal sac, left greater than right.  The L3-L4 level demonstrates minimal central and left-sided enhancement   of the disc with extensive  enhancement of the paraspinous elements with   enhancement of the posterior and lateral epidural canal. No intradural   enhancement is noted. There is some enhancement around the right residual   facet . A portion of the left facet complex is absent..  The L4-L5 level again demonstrates comparable diffuse paraspinous   enhancement and enhancement surrounding the thecal sac within the central   canal. There is enhancement of the bony elements including the pedicles   and facet complex.  L5-S1 demonstrate some  facet enhancement and posterior perithecal   enhancement  IMPRESSION:       Patient's had extensive operative intervention with removal of   pedicle screws at L2-L3 L4 and L5. There is extensive enhancement of the   operative bed in the posterior epidural region as well is some   enhancement of the scar tissue around the thecal sac. There Is no   intradural or intramedullary enhancement although there is clumping of   the cauda and enhancement of the L2-L3 and L4-L5 disc. There is also   small amount enhancement of the posterior aspect of the L3-L4 disc.   enhancement consistent with gliosis. The various levels of bone   enhancement including the facet complex at L3 and L4 as well as portions   of the pedicles involving L5 involving and adjacent to the screw tracks..     Results for orders placed during the hospital encounter of 07/11/18   CT Lumbar Spine Without Contrast    Narrative COMPARISON:  Lumbar spine MRI performed on 06/08/2011 and lumbar spine radiographs on 08/08/2011  FINDINGS:  Since the prior MRI examination, development complete osseous  fusion the L2 and L3 vertebral bodies.  Laminectomy/laminotomy deformities from L2 through L5.Minimal retrolisthesis at L3 with respect to L4 measuring approximately 4 mm.  At T12-L1 there is moderate degenerative disc disease with vacuum disc phenomenon.  Circumferential disc bulge with mild bilateral facet DJD that result in mild bilateral neural foraminal narrowing and mild spinal canal stenosis.  At L1-L2, there is no significant neural foraminal narrowing or spinal canal stenosis.  At L2-L3, no significant neural foraminal narrowing or spinal canal stenosis.  At L3-L4, there is moderate degenerative disc disease with vacuum disc phenomenon and central disc osteophyte complex.  Indents facet DJD contributes to moderate bilateral neural foraminal narrowing.  At L4-5, there is severe degenerative disc disease with vacuum disc phenomenon.  Advanced facet DJD contributes to moderate left and severe right neural foraminal narrowing is.  At L5-S1, there is mild bilateral neural foraminal narrowing secondary to facet DJD.  Extensive postoperative stranding throughout the posterior.  Spinal tissues.  Ascites is demonstrated in the abdomen.    Impression Multilevel postoperative and degenerative changes as discussed in detail above.  All CT scans at this facility are performed  using dose modulation techniques as appropriate to performed exam including the following:  automated exposure control; adjustment of mA and/or kV according to the patients size (this includes techniques or standardized protocols for targeted exams where dose is matched to indication/reason for exam: i.e. extremities or head);  iterative reconstruction technique.     Results for orders placed during the hospital encounter of 09/25/19   X-Ray Lumbar Spine Complete 5 View    Narrative COMPARISON:  05/30/2018  FINDINGS:  Scoliosis remains.  Vertebral body heights and alignment are stable.  Multilevel advanced degenerative disc height loss and  osteophyte formation noted.  Multilevel facet arthropathy present more prevalent at the lower lumbar spine.  No acute osseous abnormality appreciated.  Aorta iliac atherosclerotic vascular calcifications noted.    Impression Similar appearance of the spine.  Correlate with MRI exam as clinically indicated.     Results for orders placed during the hospital encounter of 05/30/18   X-Ray Lumbar Spine AP And Lateral    Narrative COMPARISON:  08/08/2011  FINDINGS:  Mild-to-moderate dextroscoliosis of the lumbar spine noted.  There is suggestion of possible mild loss of vertebral body height at L5 which may potentially be projectional in nature and was not definitely seen on prior.  Age-indeterminate compression fracture not excluded.  Further characterization could be obtained with MRI as clinically warranted.  Moderate disc height loss from L2-3 through L3-4 appears unchanged.  Multilevel facet arthropathy suspected throughout the lumbar spine.  Posterior elements appear grossly intact. No acute fractures or subluxations are demonstrated.  Visualized osseous structures appear diffusely osteopenic.    Impression As above.     Results for orders placed during the hospital encounter of 08/26/14   X-Ray Cervical Spine AP And Lateral    Narrative Findings: Vertebral alignment is normal.  There is narrowing of the disk spaces and  anterior osteophyte formation C 3-7.  There are no compression fractures or acute abnormalities are seen.  Carotid calcifications are noted bilaterally.    Impression  Advanced degenerative change in the cervical spine.           Review of Systems:  Constitutional: Negative for fever.   Eyes: Negative for blurred vision.   Respiratory: Negative for cough and wheezing.    Cardiovascular: Negative for chest pain and orthopnea.   Gastrointestinal: Negative for constipation, diarrhea, nausea and vomiting.   Genitourinary: Negative for dysuria.   Musculoskeletal: Positive for back pain.   Skin: Negative  "for itching and rash.   Neurological: Negative for weakness.   Endo/Heme/Allergies: Does not bruise/bleed easily.         Physical Exam:  Vitals:  /71 (BP Location: Right arm, Patient Position: Sitting, BP Method: Medium (Automatic))   Pulse 73   Resp 18   Ht 5' 9" (1.753 m)   Wt 93 kg (205 lb)   BMI 30.27 kg/m²   (reviewed on 3/10/2020)    General: alert and oriented, in no apparent distress.  Gait: normal gait.  Skin: no rashes, no discoloration, no obvious lesions  HEENT: normocephalic, atraumatic. Pupils equal and round.  Cardiovascular: no significant peripheral edema present.  Respiratory: without use of accessory muscles of respiration.    Musculoskeletal - Lumbar Spine:  - Limited ROM  - Inspection: healed scar   - Pain on flexion of lumbar spine: Absent   - Pain on extension of lumbar spine: Present  - Lumbar facet loading: Absent   - TTP over the lumbar facet joints: Present  - TTP over the lumbar paraspinals: Present  - TTP over the SI joints:  Absent   - Straight Leg Raise: Equivocal     Neuro - Lower Extremities:  - RLE Strength:     >> 5/5 strength with right hip flexion/ extension    >> 5/5 strength with right knee flexion/ extension    >> 5/5 strength in right ankle with plantar and dorsiflexion  - LLE Strength:     >> 5/5 strength with left hip flexion/ extension    >> 5/5 strength with knee flexion extension on the left     >> 5/5 strength in left ankle with plantar and dorsiflexion  - Extremity Reflexes: Brisk and symmetric throughout  - Sensory: Sensation to light touch intact bilaterally      Psych:  Mood and affect is appropriate      Assessment  1. 79 y.o. year old patient with PMH of   Past Medical History:   Diagnosis Date    Abnormal PFT     Anemia     Arthritis     Atrial fibrillation 10/19/2017    Back pain     Congestive heart failure 3/5/2018    Coronary artery disease     Diabetes mellitus 01/2018     am 02/27/2018    Hyperlipemia     Hypertension     " Myocardial infarction     Obesity     NASREEN (obstructive sleep apnea) 6/5/2018    Prostate cancer 2015    Tobacco dependence     Type 2 diabetes mellitus       presenting with pain located lumbar spine. Diagnoses include:    ICD-10-CM ICD-9-CM   1. Lumbar radiculopathy M54.16 724.4   2. Lumbar spondylosis M47.816 721.3   3. DDD (degenerative disc disease), lumbar M51.36 722.52      2. Pain Generators / Etiology: Lumbar Spondylosis and Lumbar Degenerative Disc Disease  3. Failed Meds (E- Effective, NE- Not Effective):  Tylenol-not effective  4. Physical Therapy - Completed in the Past  5. Psychological comorbidities - None  6. Anticoagulants / Antiplatelets: Aspirin 81mg and Plavix     Plan:  1. Interventional: S/p caudal XUAN on 2/10/20 with 15% pain relief x 2 days.     2. Pharmacologic:   - Will start gabapentin 600mg QHS (with titration instructions, take 1 tablet QHS x 1 week, then increase to 2 capsules at night thereafter, increasing as tolerated).   - Tylenol 650mg QID PRN pain. He currently only takes about 4 tabs per week.   - We have given tramadol 50mg PRN (5 tabs) for severe pain in the past.     3. Rehabilitative: Encouraged regular exercise.    4. Diagnostic: None for now.    5. Follow up: PRN     - I discussed the risks, benefits, and alternatives to potential treatment options. All questions and concerns were fully addressed today in clinic.

## 2020-03-12 ENCOUNTER — PATIENT MESSAGE (OUTPATIENT)
Dept: CARDIOLOGY | Facility: CLINIC | Age: 80
End: 2020-03-12

## 2020-03-20 ENCOUNTER — LAB VISIT (OUTPATIENT)
Dept: LAB | Facility: HOSPITAL | Age: 80
End: 2020-03-20
Attending: FAMILY MEDICINE
Payer: MEDICARE

## 2020-03-20 DIAGNOSIS — E11.69 DIABETES MELLITUS TYPE 2 IN OBESE: ICD-10-CM

## 2020-03-20 DIAGNOSIS — E66.9 DIABETES MELLITUS TYPE 2 IN OBESE: ICD-10-CM

## 2020-03-20 DIAGNOSIS — I10 ESSENTIAL HYPERTENSION: ICD-10-CM

## 2020-03-20 LAB
ALBUMIN SERPL BCP-MCNC: 4 G/DL (ref 3.5–5.2)
ALP SERPL-CCNC: 62 U/L (ref 55–135)
ALT SERPL W/O P-5'-P-CCNC: 12 U/L (ref 10–44)
ANION GAP SERPL CALC-SCNC: 12 MMOL/L (ref 8–16)
AST SERPL-CCNC: 17 U/L (ref 10–40)
BILIRUB SERPL-MCNC: 0.4 MG/DL (ref 0.1–1)
BUN SERPL-MCNC: 23 MG/DL (ref 8–23)
CALCIUM SERPL-MCNC: 9.7 MG/DL (ref 8.7–10.5)
CHLORIDE SERPL-SCNC: 105 MMOL/L (ref 95–110)
CHOLEST SERPL-MCNC: 171 MG/DL (ref 120–199)
CHOLEST/HDLC SERPL: 4.5 {RATIO} (ref 2–5)
CO2 SERPL-SCNC: 25 MMOL/L (ref 23–29)
CREAT SERPL-MCNC: 1.5 MG/DL (ref 0.5–1.4)
EST. GFR  (AFRICAN AMERICAN): 51.2 ML/MIN/1.73 M^2
EST. GFR  (NON AFRICAN AMERICAN): 44.3 ML/MIN/1.73 M^2
ESTIMATED AVG GLUCOSE: 126 MG/DL (ref 68–131)
GLUCOSE SERPL-MCNC: 116 MG/DL (ref 70–110)
HBA1C MFR BLD HPLC: 6 % (ref 4–5.6)
HDLC SERPL-MCNC: 38 MG/DL (ref 40–75)
HDLC SERPL: 22.2 % (ref 20–50)
LDLC SERPL CALC-MCNC: 75 MG/DL (ref 63–159)
NONHDLC SERPL-MCNC: 133 MG/DL
POTASSIUM SERPL-SCNC: 4.2 MMOL/L (ref 3.5–5.1)
PROT SERPL-MCNC: 7.7 G/DL (ref 6–8.4)
SODIUM SERPL-SCNC: 142 MMOL/L (ref 136–145)
TRIGL SERPL-MCNC: 290 MG/DL (ref 30–150)

## 2020-03-20 PROCEDURE — 80061 LIPID PANEL: CPT | Mod: HCNC

## 2020-03-20 PROCEDURE — 80053 COMPREHEN METABOLIC PANEL: CPT | Mod: HCNC

## 2020-03-20 PROCEDURE — 83036 HEMOGLOBIN GLYCOSYLATED A1C: CPT | Mod: HCNC

## 2020-03-20 PROCEDURE — 36415 COLL VENOUS BLD VENIPUNCTURE: CPT | Mod: HCNC,PO

## 2020-03-23 ENCOUNTER — PATIENT MESSAGE (OUTPATIENT)
Dept: FAMILY MEDICINE | Facility: CLINIC | Age: 80
End: 2020-03-23

## 2020-03-24 ENCOUNTER — TELEPHONE (OUTPATIENT)
Dept: FAMILY MEDICINE | Facility: CLINIC | Age: 80
End: 2020-03-24

## 2020-03-24 NOTE — TELEPHONE ENCOUNTER
109/64 pt bp this morning with pulse 64, pt stated he feels wonderful. Air volume meter 3500 is his air flow. 108 is bs this morning. Pt still taking the allergy pill and seems it does work. Pt was scheduled 4/30/2020 pt stated he is also taking b12

## 2020-03-28 ENCOUNTER — PATIENT MESSAGE (OUTPATIENT)
Dept: CARDIOLOGY | Facility: CLINIC | Age: 80
End: 2020-03-28

## 2020-04-19 ENCOUNTER — PATIENT OUTREACH (OUTPATIENT)
Dept: ADMINISTRATIVE | Facility: OTHER | Age: 80
End: 2020-04-19

## 2020-04-21 ENCOUNTER — OFFICE VISIT (OUTPATIENT)
Dept: CARDIOLOGY | Facility: CLINIC | Age: 80
End: 2020-04-21
Payer: MEDICARE

## 2020-04-21 DIAGNOSIS — E78.2 MIXED HYPERLIPIDEMIA: ICD-10-CM

## 2020-04-21 DIAGNOSIS — J98.4 CHRONIC RESTRICTIVE LUNG DISEASE: ICD-10-CM

## 2020-04-21 DIAGNOSIS — I25.2 MI, OLD: ICD-10-CM

## 2020-04-21 DIAGNOSIS — I25.10 CORONARY ARTERY DISEASE INVOLVING NATIVE CORONARY ARTERY OF NATIVE HEART WITHOUT ANGINA PECTORIS: Primary | ICD-10-CM

## 2020-04-21 DIAGNOSIS — I48.0 PAROXYSMAL ATRIAL FIBRILLATION: ICD-10-CM

## 2020-04-21 DIAGNOSIS — G47.33 OBSTRUCTIVE SLEEP APNEA: ICD-10-CM

## 2020-04-21 DIAGNOSIS — Z95.810 ICD (IMPLANTABLE CARDIOVERTER-DEFIBRILLATOR) IN PLACE: ICD-10-CM

## 2020-04-21 DIAGNOSIS — I50.42 CHRONIC COMBINED SYSTOLIC AND DIASTOLIC CONGESTIVE HEART FAILURE: ICD-10-CM

## 2020-04-21 DIAGNOSIS — I51.7 CARDIOMEGALY: ICD-10-CM

## 2020-04-21 DIAGNOSIS — Z95.1 S/P CABG X 3: ICD-10-CM

## 2020-04-21 DIAGNOSIS — I10 ESSENTIAL HYPERTENSION: ICD-10-CM

## 2020-04-21 PROCEDURE — 1101F PR PT FALLS ASSESS DOC 0-1 FALLS W/OUT INJ PAST YR: ICD-10-PCS | Mod: 95,HCNC,CPTII, | Performed by: INTERNAL MEDICINE

## 2020-04-21 PROCEDURE — 1101F PT FALLS ASSESS-DOCD LE1/YR: CPT | Mod: 95,HCNC,CPTII, | Performed by: INTERNAL MEDICINE

## 2020-04-21 PROCEDURE — 99443 PR PHYSICIAN TELEPHONE EVALUATION 21-30 MIN: CPT | Mod: 95,HCNC,, | Performed by: INTERNAL MEDICINE

## 2020-04-21 PROCEDURE — 1159F PR MEDICATION LIST DOCUMENTED IN MEDICAL RECORD: ICD-10-PCS | Mod: 95,HCNC,, | Performed by: INTERNAL MEDICINE

## 2020-04-21 PROCEDURE — 99443 PR PHYSICIAN TELEPHONE EVALUATION 21-30 MIN: ICD-10-PCS | Mod: 95,HCNC,, | Performed by: INTERNAL MEDICINE

## 2020-04-21 PROCEDURE — 1159F MED LIST DOCD IN RCRD: CPT | Mod: 95,HCNC,, | Performed by: INTERNAL MEDICINE

## 2020-04-21 RX ORDER — FUROSEMIDE 40 MG/1
40 TABLET ORAL 2 TIMES DAILY
Qty: 180 TABLET | Refills: 1 | Status: SHIPPED | OUTPATIENT
Start: 2020-04-21 | End: 2020-05-22 | Stop reason: SDUPTHER

## 2020-04-21 NOTE — PROGRESS NOTES
Subjective:   Patient ID:  Jake Ortiz is a 79 y.o. male who presents for cardiac consult of Establish Care; Coronary Artery Disease; and Congestive Heart Failure    The patient location is: home  The chief complaint leading to consultation is: establish care, CAD  Visit type: audio only  Total time spent with patient: 30 min  Each patient to whom he or she provides medical services by telemedicine is:  (1) informed of the relationship between the physician and patient and the respective role of any other health care provider with respect to management of the patient; and (2) notified that he or she may decline to receive medical services by telemedicine and may withdraw from such care at any time.    Notes: see below    HPI  The patient came in today for cardiac consult of Establish Care; Coronary Artery Disease; and Congestive Heart Failure    Jake Ortiz is a 79 y.o. male pt with CAD s/p CABG, old MI CHF EF 40-45%, AVB s/p CRT, PAF,  HTN, HLD, DM2, NASREEN, CKD, GERD, Restrictive/obstructive lung disease here for initial CV eval to establish care.    Pt seen at [facility] last by CARMELINA Spencer 9/18/19. Overall feels great for the most part. He has been started to gain some water weight in the past, he had been on Entreso by Dr. Menendez, lost 173 lbs. He had paracentesis in past was told due to CHF. His weight started coming back up to 208 lbs and then was gaining weight and he has doubled his meds - Entresto. Otherwise feels well has good energy. He changed here due to insurance.     BP - 108/67, pulse 62, oxygen 98%, weight 209 lbs, blood sugar 127    Reviewed prior chart from [facility]    Patient feels no chest pain, no sob, no leg swelling, no PND, no palpitation, no dizziness, no syncope, no CNS symptoms.    Patient has fairly good exercise tolerance.    Patient is compliant with medications.      6/10/18 ECHO  1. Mild to moderate left ventricular systolic dysfunction with visually    estimated ejection fraction 40-45%.    Past Medical History:   Diagnosis Date    Abnormal PFT     Anemia     Arthritis     Atrial fibrillation 10/19/2017    Back pain     Congestive heart failure 3/5/2018    Coronary artery disease     Diabetes mellitus 2018     am 2018    Hyperlipemia     Hypertension     Myocardial infarction     Obesity     NASREEN (obstructive sleep apnea) 2018    Prostate cancer 2015    Tobacco dependence     Type 2 diabetes mellitus        Past Surgical History:   Procedure Laterality Date    CORONARY ARTERY BYPASS GRAFT  1987    EPIDURAL STEROID INJECTION N/A 2019    Procedure: Lumbar L5/S1 IL XUAN;  Surgeon: Tacho Trivedi MD;  Location: [facility] PAIN [facility]GT;  Service: Pain Management;  Laterality: N/A;    EPIDURAL STEROID INJECTION N/A 2020    Procedure: Lumbar L5/S1 IL XUAN;  Surgeon: Tacho Trivedi MD;  Location: [facility] PAIN [facility]GT;  Service: Pain Management;  Laterality: N/A;    EPIDURAL STEROID INJECTION N/A 2/10/2020    Procedure: Caudal XUAN;  Surgeon: Tacho Trivedi MD;  Location: [facility] PAIN [facility]GT;  Service: Pain Management;  Laterality: N/A;    PROSTATE SURGERY      prostate radiation    SPINE SURGERY      fusion    TONSILLECTOMY         Social History     Tobacco Use    Smoking status: Former Smoker     Packs/day: 1.50     Years: 20.00     Pack years: 30.00     Types: Cigarettes     Start date:      Last attempt to quit:      Years since quittin.3    Smokeless tobacco: Never Used   Substance Use Topics    Alcohol use: No     Frequency: Never     Binge frequency: Never    Drug use: No       Family History   Problem Relation Age of Onset    Heart attack Mother     Diabetes Mother     Heart disease Mother     Cataracts Mother     Stroke Father     Heart disease Father     Heart disease Brother        Patient's Medications   New Prescriptions    No medications on file   Previous Medications    ACETAMINOPHEN (TYLENOL) 500  MG TABLET    Take 500 mg by mouth every 6 (six) hours as needed for Pain.    ASPIRIN (ECOTRIN) 81 MG EC TABLET    Take 81 mg by mouth.    ATORVASTATIN (LIPITOR) 80 MG TABLET    TK 1 T PO D    BLOOD SUGAR DIAGNOSTIC STRP    Check blood glucose levels daily in the AM fasting and 1-2 times more daily.    BLOOD-GLUCOSE METER KIT    Use as instructed    CHOLECALCIFEROL, VITAMIN D3, (VITAMIN D3) 1,000 UNIT CAPSULE    Take 5,000 Units by mouth once daily.    CLONAZEPAM (KLONOPIN) 1 MG TABLET    TK 1 T PO HS    CLOPIDOGREL (PLAVIX) 75 MG TABLET    Take 75 mg by mouth once daily.    ENTRESTO 24-26 MG PER TABLET        FISH OIL-OMEGA-3 FATTY ACIDS 300-1,000 MG CAPSULE    Take 1 capsule by mouth once daily.    FLUTICASONE (FLONASE) 50 MCG/ACTUATION NASAL SPRAY    2 sprays (100 mcg total) by Each Nare route once daily.    GABAPENTIN (NEURONTIN) 300 MG CAPSULE    Take 1 capsule (300mg) by mouth every night X 1 week then increase to 2 capsules (600mg) QHS thereafter. Increase as tolerated.    LANCETS MISC    Check blood glucose levels daily in the AM fasting and 1-2 times more daily.    LEVOCETIRIZINE (XYZAL) 5 MG TABLET    TAKE 1 TABLET EVERY EVENING    MULTIVITAMIN CAPSULE    Take 1 capsule by mouth once daily.    NITROGLYCERIN (NITROSTAT) 0.3 MG SL TABLET    PLACE 1 TABLET UNDER THE TONGUE EVERY 5 MINUTES AS NEEDED FOR CHEST PAIN AS DIRECTED    SPIRONOLACTONE (ALDACTONE) 50 MG TABLET    TK 1 T PO D   Modified Medications    Modified Medication Previous Medication    FUROSEMIDE (LASIX) 40 MG TABLET furosemide (LASIX) 20 MG tablet       Take 1 tablet (40 mg total) by mouth 2 (two) times daily.    Take 1 tablet (20 mg total) by mouth 2 (two) times daily.   Discontinued Medications    No medications on file       Review of Systems   Constitutional: Negative.    HENT: Negative.    Eyes: Negative.    Respiratory: Negative.    Cardiovascular: Negative.    Gastrointestinal: Negative.    Genitourinary: Negative.    Musculoskeletal:  Negative.    Skin: Negative.    Neurological: Negative.    Endo/Heme/Allergies: Negative.    Psychiatric/Behavioral: Negative.    All 12 systems otherwise negative.      Wt Readings from Last 3 Encounters:   03/10/20 93 kg (205 lb)   02/10/20 93.2 kg (205 lb 5.7 oz)   01/31/20 93.4 kg (206 lb)     Temp Readings from Last 3 Encounters:   02/10/20 97.9 °F (36.6 °C) (Temporal)   01/06/20 97.9 °F (36.6 °C) (Temporal)   11/22/19 97.5 °F (36.4 °C) (Temporal)     BP Readings from Last 3 Encounters:   03/10/20 123/71   02/10/20 114/64   01/31/20 97/65     Pulse Readings from Last 3 Encounters:   03/10/20 73   02/10/20 61   01/31/20 83       There were no vitals taken for this visit.    Objective:   Physical Exam    Lab Results   Component Value Date     03/20/2020    K 4.2 03/20/2020     03/20/2020    CO2 25 03/20/2020    BUN 23 03/20/2020    CREATININE 1.5 (H) 03/20/2020     (H) 03/20/2020    HGBA1C 6.0 (H) 03/20/2020    MG 1.8 05/27/2011    AST 17 03/20/2020    ALT 12 03/20/2020    ALBUMIN 4.0 03/20/2020    PROT 7.7 03/20/2020    BILITOT 0.4 03/20/2020    WBC 7.67 03/20/2019    HGB 13.6 (L) 03/20/2019    HCT 41.5 03/20/2019    MCV 98 03/20/2019     03/20/2019    INR 1.3 (H) 10/31/2018    TSH 2.181 01/12/2018    CHOL 171 03/20/2020    HDL 38 (L) 03/20/2020    LDLCALC 75.0 03/20/2020    TRIG 290 (H) 03/20/2020     (H) 07/31/2018     Assessment:      1. Coronary artery disease involving native coronary artery of native heart without angina pectoris    2. Essential hypertension    3. Mixed hyperlipidemia    4. MI, old    5. S/P CABG x 3    6. Cardiomegaly    7. Chronic combined systolic and diastolic congestive heart failure    8. Paroxysmal atrial fibrillation    9. Chronic restrictive lung disease    10. Obstructive sleep apnea    11. ICD (implantable cardioverter-defibrillator) in place        Plan:   1. CAD s/p CABG, old MI  - cont meds  - will order ischemic workup once safe from COVID  rest    2. CHF EF 40-45%  - cont meds - increase lasix to 40mg daily  - order BNP, CMP, Mg  - ordered ECHO, once stable    3. PAF had episode of PNA  - cont meds    4. NASREEN  - cont CPAP    5. Restricive/obstrucive lung disease  - cont tx per PCP/pulm    6. DM2 6.0   - cont tx per PCP    7. AVB s/p ICD  - cont to monitor  -refer to device clinic    Thank you for allowing me to participate in this patient's care. Please do not hesitate to contact me with any questions or concerns. Consult note has been forwarded to the referral physician.

## 2020-04-24 RX ORDER — GABAPENTIN 300 MG/1
CAPSULE ORAL
Qty: 60 CAPSULE | Refills: 0 | Status: SHIPPED | OUTPATIENT
Start: 2020-04-24 | End: 2020-05-24

## 2020-04-27 ENCOUNTER — LAB VISIT (OUTPATIENT)
Dept: LAB | Facility: HOSPITAL | Age: 80
End: 2020-04-27
Attending: INTERNAL MEDICINE
Payer: MEDICARE

## 2020-04-27 DIAGNOSIS — I50.42 CHRONIC COMBINED SYSTOLIC AND DIASTOLIC CONGESTIVE HEART FAILURE: ICD-10-CM

## 2020-04-27 DIAGNOSIS — I25.10 CORONARY ARTERY DISEASE INVOLVING NATIVE CORONARY ARTERY OF NATIVE HEART WITHOUT ANGINA PECTORIS: ICD-10-CM

## 2020-04-27 LAB
ALBUMIN SERPL BCP-MCNC: 4 G/DL (ref 3.5–5.2)
ALP SERPL-CCNC: 68 U/L (ref 55–135)
ALT SERPL W/O P-5'-P-CCNC: 14 U/L (ref 10–44)
ANION GAP SERPL CALC-SCNC: 11 MMOL/L (ref 8–16)
AST SERPL-CCNC: 19 U/L (ref 10–40)
BILIRUB SERPL-MCNC: 0.5 MG/DL (ref 0.1–1)
BNP SERPL-MCNC: 85 PG/ML (ref 0–99)
BUN SERPL-MCNC: 29 MG/DL (ref 8–23)
CALCIUM SERPL-MCNC: 9.6 MG/DL (ref 8.7–10.5)
CHLORIDE SERPL-SCNC: 101 MMOL/L (ref 95–110)
CO2 SERPL-SCNC: 28 MMOL/L (ref 23–29)
CREAT SERPL-MCNC: 1.7 MG/DL (ref 0.5–1.4)
EST. GFR  (AFRICAN AMERICAN): 44 ML/MIN/1.73 M^2
EST. GFR  (NON AFRICAN AMERICAN): 38.1 ML/MIN/1.73 M^2
GLUCOSE SERPL-MCNC: 131 MG/DL (ref 70–110)
MAGNESIUM SERPL-MCNC: 1.8 MG/DL (ref 1.6–2.6)
POTASSIUM SERPL-SCNC: 4.2 MMOL/L (ref 3.5–5.1)
PROT SERPL-MCNC: 7.7 G/DL (ref 6–8.4)
SODIUM SERPL-SCNC: 140 MMOL/L (ref 136–145)

## 2020-04-27 PROCEDURE — 80053 COMPREHEN METABOLIC PANEL: CPT | Mod: HCNC

## 2020-04-27 PROCEDURE — 83880 ASSAY OF NATRIURETIC PEPTIDE: CPT | Mod: HCNC

## 2020-04-27 PROCEDURE — 83735 ASSAY OF MAGNESIUM: CPT | Mod: HCNC

## 2020-04-27 PROCEDURE — 36415 COLL VENOUS BLD VENIPUNCTURE: CPT | Mod: HCNC,PO

## 2020-04-28 ENCOUNTER — TELEPHONE (OUTPATIENT)
Dept: CARDIOLOGY | Facility: CLINIC | Age: 80
End: 2020-04-28

## 2020-04-28 NOTE — TELEPHONE ENCOUNTER
----- Message from Phillip Hood MD sent at 4/28/2020  8:57 AM CDT -----  Please call the patient regarding his result. BNP is normal, no extra fluid now, kidney function appears dry now, hold lasix and only take if you have weight gain of 3 pounds in a day or more and if you feel more short of breath or fluid buildup. Follow up in 2 weeks, need to do daily weights and check BP.

## 2020-05-20 ENCOUNTER — OFFICE VISIT (OUTPATIENT)
Dept: FAMILY MEDICINE | Facility: CLINIC | Age: 80
End: 2020-05-20
Payer: MEDICARE

## 2020-05-20 ENCOUNTER — PATIENT OUTREACH (OUTPATIENT)
Dept: ADMINISTRATIVE | Facility: OTHER | Age: 80
End: 2020-05-20

## 2020-05-20 VITALS
TEMPERATURE: 99 F | WEIGHT: 213.94 LBS | DIASTOLIC BLOOD PRESSURE: 62 MMHG | SYSTOLIC BLOOD PRESSURE: 110 MMHG | OXYGEN SATURATION: 96 % | BODY MASS INDEX: 31.69 KG/M2 | HEIGHT: 69 IN | HEART RATE: 84 BPM

## 2020-05-20 DIAGNOSIS — I70.0 ARTERIOSCLEROSIS OF AORTA: ICD-10-CM

## 2020-05-20 DIAGNOSIS — E78.2 MIXED HYPERLIPIDEMIA: ICD-10-CM

## 2020-05-20 DIAGNOSIS — K21.9 GASTROESOPHAGEAL REFLUX DISEASE WITHOUT ESOPHAGITIS: ICD-10-CM

## 2020-05-20 DIAGNOSIS — I48.0 PAROXYSMAL ATRIAL FIBRILLATION: ICD-10-CM

## 2020-05-20 DIAGNOSIS — J30.9 ALLERGIC RHINITIS, UNSPECIFIED SEASONALITY, UNSPECIFIED TRIGGER: ICD-10-CM

## 2020-05-20 DIAGNOSIS — C61 ADENOCARCINOMA OF PROSTATE: ICD-10-CM

## 2020-05-20 DIAGNOSIS — E11.69 DIABETES MELLITUS TYPE 2 IN OBESE: Primary | ICD-10-CM

## 2020-05-20 DIAGNOSIS — I51.7 CARDIOMEGALY: ICD-10-CM

## 2020-05-20 DIAGNOSIS — G47.61 PERIODIC LIMB MOVEMENT DISORDER (PLMD): ICD-10-CM

## 2020-05-20 DIAGNOSIS — J43.9 MIXED RESTRICTIVE AND OBSTRUCTIVE LUNG DISEASE: ICD-10-CM

## 2020-05-20 DIAGNOSIS — J98.4 MIXED RESTRICTIVE AND OBSTRUCTIVE LUNG DISEASE: ICD-10-CM

## 2020-05-20 DIAGNOSIS — I10 ESSENTIAL HYPERTENSION: ICD-10-CM

## 2020-05-20 DIAGNOSIS — D71 GRANULOMATOUS DISEASE: ICD-10-CM

## 2020-05-20 DIAGNOSIS — E66.9 DIABETES MELLITUS TYPE 2 IN OBESE: Primary | ICD-10-CM

## 2020-05-20 DIAGNOSIS — I25.10 CORONARY ARTERY DISEASE INVOLVING NATIVE CORONARY ARTERY OF NATIVE HEART WITHOUT ANGINA PECTORIS: ICD-10-CM

## 2020-05-20 PROCEDURE — 1101F PT FALLS ASSESS-DOCD LE1/YR: CPT | Mod: HCNC,CPTII,S$GLB, | Performed by: FAMILY MEDICINE

## 2020-05-20 PROCEDURE — 99214 OFFICE O/P EST MOD 30 MIN: CPT | Mod: HCNC,S$GLB,, | Performed by: FAMILY MEDICINE

## 2020-05-20 PROCEDURE — 3074F PR MOST RECENT SYSTOLIC BLOOD PRESSURE < 130 MM HG: ICD-10-PCS | Mod: HCNC,CPTII,S$GLB, | Performed by: FAMILY MEDICINE

## 2020-05-20 PROCEDURE — 99214 PR OFFICE/OUTPT VISIT, EST, LEVL IV, 30-39 MIN: ICD-10-PCS | Mod: HCNC,S$GLB,, | Performed by: FAMILY MEDICINE

## 2020-05-20 PROCEDURE — 1159F PR MEDICATION LIST DOCUMENTED IN MEDICAL RECORD: ICD-10-PCS | Mod: HCNC,S$GLB,, | Performed by: FAMILY MEDICINE

## 2020-05-20 PROCEDURE — 1159F MED LIST DOCD IN RCRD: CPT | Mod: HCNC,S$GLB,, | Performed by: FAMILY MEDICINE

## 2020-05-20 PROCEDURE — 3078F DIAST BP <80 MM HG: CPT | Mod: HCNC,CPTII,S$GLB, | Performed by: FAMILY MEDICINE

## 2020-05-20 PROCEDURE — 99499 UNLISTED E&M SERVICE: CPT | Mod: HCNC,S$GLB,, | Performed by: FAMILY MEDICINE

## 2020-05-20 PROCEDURE — 3074F SYST BP LT 130 MM HG: CPT | Mod: HCNC,CPTII,S$GLB, | Performed by: FAMILY MEDICINE

## 2020-05-20 PROCEDURE — 3078F PR MOST RECENT DIASTOLIC BLOOD PRESSURE < 80 MM HG: ICD-10-PCS | Mod: HCNC,CPTII,S$GLB, | Performed by: FAMILY MEDICINE

## 2020-05-20 PROCEDURE — 99999 PR PBB SHADOW E&M-EST. PATIENT-LVL III: CPT | Mod: PBBFAC,HCNC,, | Performed by: FAMILY MEDICINE

## 2020-05-20 PROCEDURE — 1101F PR PT FALLS ASSESS DOC 0-1 FALLS W/OUT INJ PAST YR: ICD-10-PCS | Mod: HCNC,CPTII,S$GLB, | Performed by: FAMILY MEDICINE

## 2020-05-20 PROCEDURE — 1126F AMNT PAIN NOTED NONE PRSNT: CPT | Mod: HCNC,S$GLB,, | Performed by: FAMILY MEDICINE

## 2020-05-20 PROCEDURE — 99499 RISK ADDL DX/OHS AUDIT: ICD-10-PCS | Mod: HCNC,S$GLB,, | Performed by: FAMILY MEDICINE

## 2020-05-20 PROCEDURE — 1126F PR PAIN SEVERITY QUANTIFIED, NO PAIN PRESENT: ICD-10-PCS | Mod: HCNC,S$GLB,, | Performed by: FAMILY MEDICINE

## 2020-05-20 PROCEDURE — 99999 PR PBB SHADOW E&M-EST. PATIENT-LVL III: ICD-10-PCS | Mod: PBBFAC,HCNC,, | Performed by: FAMILY MEDICINE

## 2020-05-20 RX ORDER — LEVOCETIRIZINE DIHYDROCHLORIDE 5 MG/1
5 TABLET, FILM COATED ORAL NIGHTLY
Qty: 90 TABLET | Refills: 1 | Status: SHIPPED | OUTPATIENT
Start: 2020-05-20 | End: 2020-11-05

## 2020-05-20 RX ORDER — FLUTICASONE PROPIONATE 50 MCG
2 SPRAY, SUSPENSION (ML) NASAL DAILY
Qty: 48 G | Refills: 1 | Status: SHIPPED | OUTPATIENT
Start: 2020-05-20 | End: 2020-11-05

## 2020-05-20 RX ORDER — CLONAZEPAM 1 MG/1
TABLET ORAL
Qty: 30 TABLET | Refills: 2 | Status: SHIPPED | OUTPATIENT
Start: 2020-05-20 | End: 2020-06-24

## 2020-05-20 NOTE — PROGRESS NOTES
Subjective:       Patient ID: Jake Ortiz is a 79 y.o. male.    Chief Complaint: Chronic Care    HPI   Chronic Care  78yo male presents today for chronic care assessment. He had updated labs in March 2020 and is here today to review results. A1c is stable- currently at 6.0 from 6.1 at last check. Blood glucose levels have been below 130. On recall he notes a low of 97 and high of 147. He is not on any RX measures for glucose control. Denies any symptoms of hyper or hypoglycemia. His diabetic eye exam is due- no vision concerns are reported. Foot exam is up to date. He has been seen by Cardiology since last visit and no changes to his regimen have been made. He will have further evaluation in the coming week. He has been CP free. BP has been stable. He denies any significant lower extremity edema. His respiratory symptoms have been stable. AR symptoms have been varied. He has been taking Xyzal but has not been using the Flonase as prescribed. He has been prescribed Klonopin for several years per his former Cardiologist for [facility]. Requesting renewal. Notes sleep has significantly improved with use and denies any adverse effects. There have been no recent ER visits or hospitalizations.     Review of Systems   Constitutional: Negative for activity change and unexpected weight change.   HENT: Negative for hearing loss, rhinorrhea and trouble swallowing.    Eyes: Negative for discharge and visual disturbance.   Respiratory: Negative for chest tightness and wheezing.    Cardiovascular: Negative for chest pain and palpitations.   Gastrointestinal: Negative for blood in stool, constipation, diarrhea and vomiting.   Endocrine: Negative for polydipsia and polyuria.   Genitourinary: Negative for difficulty urinating, hematuria and urgency.   Musculoskeletal: Negative for arthralgias, joint swelling and neck pain.   Neurological: Negative for weakness and headaches.   Psychiatric/Behavioral: Negative for confusion and dysphoric  "mood.       Objective:   /62   Pulse 84   Temp 98.7 °F (37.1 °C)   Ht 5' 9" (1.753 m)   Wt 97 kg (213 lb 15.3 oz)   SpO2 96%   BMI 31.60 kg/m²   Physical Exam   Constitutional: He is oriented to person, place, and time. He appears well-developed and well-nourished. No distress.   HENT:   Head: Normocephalic and atraumatic.   Right Ear: Tympanic membrane, external ear and ear canal normal.   Left Ear: Tympanic membrane, external ear and ear canal normal.   Nose: Nose normal.   Mouth/Throat: Oropharynx is clear and moist.   Eyes: Pupils are equal, round, and reactive to light. Conjunctivae and EOM are normal.   Neck: Normal range of motion. Neck supple.   Cardiovascular: Normal rate, regular rhythm and normal heart sounds.   Pulmonary/Chest: Effort normal and breath sounds normal.   Abdominal: Soft. Bowel sounds are normal.   Musculoskeletal: He exhibits no edema.   Neurological: He is alert and oriented to person, place, and time.   Skin: Skin is warm and dry. He is not diaphoretic.   Psychiatric: He has a normal mood and affect. His behavior is normal.       Assessment:       1. Diabetes mellitus type 2 in obese    2. Allergic rhinitis, unspecified seasonality, unspecified trigger    3. Periodic limb movement disorder (PLMD)    4. Essential hypertension    5. Mixed hyperlipidemia    6. Paroxysmal atrial fibrillation    7. Coronary artery disease involving native coronary artery of native heart without angina pectoris    8. Cardiomegaly    9. Gastroesophageal reflux disease without esophagitis    10. Mixed restrictive and obstructive lung disease    11. Granulomatous disease    12. Arteriosclerosis of aorta    13. Adenocarcinoma of prostate        Plan:      Diabetes mellitus type 2 in obese  Stable. Continue with current treatment. Target A1c remains <7.0. AM fasting glucose goals have been reviewed (). Recommend routine annual diabetic eye and foot examination. Will plan to reassess in 6 months " with labs prior to the visit.   -     Hemoglobin A1C; Future; Expected date: 05/20/2020  -     Comprehensive metabolic panel; Future; Expected date: 05/20/2020    Allergic rhinitis, unspecified seasonality, unspecified trigger  -     levocetirizine (XYZAL) 5 MG tablet; Take 1 tablet (5 mg total) by mouth every evening.  Dispense: 90 tablet; Refill: 1  -     fluticasone propionate (FLONASE) 50 mcg/actuation nasal spray; 2 sprays (100 mcg total) by Each Nostril route once daily.  Dispense: 48 g; Refill: 1    Periodic limb movement disorder (PLMD)  Will take over management of Klonopin at his request. Reviewed s/e profile and discussed habit forming potential.  was accessed prior to RX renewal with last fill date of 4/9/2020. Patient is aware that face to face visit is required for future renewal beyond current RX date. If he is able to make his RX last until his next planned chronic care visit then further renewal will be discussed at that time. If RX is needed in the interim he is aware a visit will be necessary. Sleep hygiene remains advised.   -     clonazePAM (KLONOPIN) 1 MG tablet; TK 1 T PO HS  Dispense: 30 tablet; Refill: 2    Essential hypertension  Stable. Continue with current treatment. Target BP goal remains<140/90. Heart healthy diet is advised. Intermittent home monitoring has been discussed.  -     Comprehensive metabolic panel; Future; Expected date: 05/20/2020    Mixed hyperlipidemia  Continue with current measures.  -     Comprehensive metabolic panel; Future; Expected date: 05/20/2020    Paroxysmal atrial fibrillation  As per Cardiology.    Coronary artery disease involving native coronary artery of native heart without angina pectoris  As per Cardiology.    Cardiomegaly  As per Cardiology.    Gastroesophageal reflux disease without esophagitis  Stable. Off medication currently. Dietary modification is advised.     Mixed restrictive and obstructive lung disease  As per  Pulmonology.    Granulomatous disease  As above.    Arteriosclerosis of aorta  BP and lipid control remain advised.    Adenocarcinoma of prostate  As per Urology.

## 2020-05-22 ENCOUNTER — PATIENT MESSAGE (OUTPATIENT)
Dept: CARDIOLOGY | Facility: CLINIC | Age: 80
End: 2020-05-22

## 2020-05-22 ENCOUNTER — OFFICE VISIT (OUTPATIENT)
Dept: CARDIOLOGY | Facility: CLINIC | Age: 80
End: 2020-05-22
Payer: MEDICARE

## 2020-05-22 DIAGNOSIS — I51.7 CARDIOMEGALY: ICD-10-CM

## 2020-05-22 DIAGNOSIS — Z95.1 S/P CABG X 3: ICD-10-CM

## 2020-05-22 DIAGNOSIS — I25.10 CORONARY ARTERY DISEASE INVOLVING NATIVE CORONARY ARTERY OF NATIVE HEART WITHOUT ANGINA PECTORIS: Primary | ICD-10-CM

## 2020-05-22 DIAGNOSIS — I25.2 MI, OLD: ICD-10-CM

## 2020-05-22 DIAGNOSIS — I48.0 PAROXYSMAL ATRIAL FIBRILLATION: ICD-10-CM

## 2020-05-22 DIAGNOSIS — E78.2 MIXED HYPERLIPIDEMIA: ICD-10-CM

## 2020-05-22 DIAGNOSIS — I50.42 CHRONIC COMBINED SYSTOLIC AND DIASTOLIC CONGESTIVE HEART FAILURE: ICD-10-CM

## 2020-05-22 DIAGNOSIS — I10 ESSENTIAL HYPERTENSION: ICD-10-CM

## 2020-05-22 DIAGNOSIS — Z95.810 ICD (IMPLANTABLE CARDIOVERTER-DEFIBRILLATOR) IN PLACE: ICD-10-CM

## 2020-05-22 PROCEDURE — 1159F MED LIST DOCD IN RCRD: CPT | Mod: 95,,, | Performed by: INTERNAL MEDICINE

## 2020-05-22 PROCEDURE — 99214 OFFICE O/P EST MOD 30 MIN: CPT | Mod: 95,,, | Performed by: INTERNAL MEDICINE

## 2020-05-22 PROCEDURE — 99499 RISK ADDL DX/OHS AUDIT: ICD-10-PCS | Mod: 95,,, | Performed by: INTERNAL MEDICINE

## 2020-05-22 PROCEDURE — 1101F PR PT FALLS ASSESS DOC 0-1 FALLS W/OUT INJ PAST YR: ICD-10-PCS | Mod: CPTII,95,, | Performed by: INTERNAL MEDICINE

## 2020-05-22 PROCEDURE — 99499 UNLISTED E&M SERVICE: CPT | Mod: 95,,, | Performed by: INTERNAL MEDICINE

## 2020-05-22 PROCEDURE — 1159F PR MEDICATION LIST DOCUMENTED IN MEDICAL RECORD: ICD-10-PCS | Mod: 95,,, | Performed by: INTERNAL MEDICINE

## 2020-05-22 PROCEDURE — 99214 PR OFFICE/OUTPT VISIT, EST, LEVL IV, 30-39 MIN: ICD-10-PCS | Mod: 95,,, | Performed by: INTERNAL MEDICINE

## 2020-05-22 PROCEDURE — 1101F PT FALLS ASSESS-DOCD LE1/YR: CPT | Mod: CPTII,95,, | Performed by: INTERNAL MEDICINE

## 2020-05-22 RX ORDER — FUROSEMIDE 40 MG/1
40 TABLET ORAL DAILY
Qty: 180 TABLET | Refills: 1 | Status: ON HOLD | OUTPATIENT
Start: 2020-05-22 | End: 2020-10-12 | Stop reason: HOSPADM

## 2020-05-22 NOTE — PROGRESS NOTES
Subjective:   Patient ID:  Jake Ortiz is a 79 y.o. male who presents for cardiac consult of Follow-up    The patient location is: home  The chief complaint leading to consultation is: CV follow up    Visit type: audiovisual    Face to Face time with patient: 10 min  20 minutes of total time spent on the encounter, which includes face to face time and non-face to face time preparing to see the patient (eg, review of tests), Obtaining and/or reviewing separately obtained history, Documenting clinical information in the electronic or other health record, Independently interpreting results (not separately reported) and communicating results to the patient/family/caregiver, or Care coordination (not separately reported).     Each patient to whom he or she provides medical services by telemedicine is:  (1) informed of the relationship between the physician and patient and the respective role of any other health care provider with respect to management of the patient; and (2) notified that he or she may decline to receive medical services by telemedicine and may withdraw from such care at any time.    Notes:       Follow-up   Pertinent negatives include no chest pain.     The patient came in today for cardiac consult of Follow-up    Jake Ortiz is a 79 y.o. male pt with CAD s/p CABG, old MI CHF EF 40-45%, AVB s/p CRT, PAF,  HTN, HLD, DM2, NASREEN, CKD, GERD, Restrictive/obstructive lung disease here for follow up CV care.    4/21/20  Pt seen at [facility] last by CARMELINA Spencer 9/18/19. Overall feels great for the most part. He has been started to gain some water weight in the past, he had been on Entreso by Dr. Menendez, lost 173 lbs. He had paracentesis in past was told due to CHF. His weight started coming back up to 208 lbs and then was gaining weight and he has doubled his meds - Entresto. Otherwise feels well has good energy. He changed here due to insurance.   BP - 108/67, pulse 62, oxygen 98%, weight 209 lbs,  blood sugar 127  Reviewed prior chart from [facility]      5/22/20  BNP was neg after last visit, Cr slightly increased with BUN elevated told to take lasix daily and PRN extra. He saw Dr. Holley yesterday, was given Flonase breathing improved.   /120s systolics, 67 diastolic, pulse 60s. . Overall doing will. Needs device clinic.     Patient feels no chest pain, no sob, no leg swelling, no PND, no palpitation, no dizziness, no syncope, no CNS symptoms.    Patient has fairly good exercise tolerance.    Patient is compliant with medications.      6/10/18 ECHO  1. Mild to moderate left ventricular systolic dysfunction with visually   estimated ejection fraction 40-45%.    Past Medical History:   Diagnosis Date    Abnormal PFT     Anemia     Arthritis     Atrial fibrillation 10/19/2017    Back pain     Congestive heart failure 3/5/2018    Coronary artery disease     Diabetes mellitus 01/2018     am 02/27/2018    Hyperlipemia     Hypertension     Myocardial infarction     Obesity     NASREEN (obstructive sleep apnea) 6/5/2018    Prostate cancer 2015    Tobacco dependence     Type 2 diabetes mellitus        Past Surgical History:   Procedure Laterality Date    CORONARY ARTERY BYPASS GRAFT  1987    EPIDURAL STEROID INJECTION N/A 11/22/2019    Procedure: Lumbar L5/S1 IL XUAN;  Surgeon: Tacho Trivedi MD;  Location: [facility];  Service: Pain Management;  Laterality: N/A;    EPIDURAL STEROID INJECTION N/A 1/6/2020    Procedure: Lumbar L5/S1 IL XUAN;  Surgeon: Tacho Trivedi MD;  Location: [facility];  Service: Pain Management;  Laterality: N/A;    EPIDURAL STEROID INJECTION N/A 2/10/2020    Procedure: Caudal XUAN;  Surgeon: Tacho Trivedi MD;  Location: [facility];  Service: Pain Management;  Laterality: N/A;    PROSTATE SURGERY      prostate radiation    SPINE SURGERY      fusion    TONSILLECTOMY         Social History     Tobacco Use    Smoking status:  Former Smoker     Packs/day: 1.50     Years: 20.00     Pack years: 30.00     Types: Cigarettes     Start date:      Last attempt to quit:      Years since quittin.4    Smokeless tobacco: Never Used   Substance Use Topics    Alcohol use: No     Frequency: Never     Binge frequency: Never    Drug use: No       Family History   Problem Relation Age of Onset    Heart attack Mother     Diabetes Mother     Heart disease Mother     Cataracts Mother     Stroke Father     Heart disease Father     Heart disease Brother        Patient's Medications   New Prescriptions    No medications on file   Previous Medications    ACETAMINOPHEN (TYLENOL) 500 MG TABLET    Take 500 mg by mouth every 6 (six) hours as needed for Pain.    ASPIRIN (ECOTRIN) 81 MG EC TABLET    Take 81 mg by mouth.    ATORVASTATIN (LIPITOR) 80 MG TABLET    TK 1 T PO D    BLOOD SUGAR DIAGNOSTIC STRP    Check blood glucose levels daily in the AM fasting and 1-2 times more daily.    BLOOD-GLUCOSE METER KIT    Use as instructed    CHOLECALCIFEROL, VITAMIN D3, (VITAMIN D3) 1,000 UNIT CAPSULE    Take 5,000 Units by mouth once daily.    CLONAZEPAM (KLONOPIN) 1 MG TABLET    TK 1 T PO HS    CLOPIDOGREL (PLAVIX) 75 MG TABLET    Take 75 mg by mouth once daily.    ENTRESTO 24-26 MG PER TABLET        FISH OIL-OMEGA-3 FATTY ACIDS 300-1,000 MG CAPSULE    Take 1 capsule by mouth once daily.    FLUTICASONE PROPIONATE (FLONASE) 50 MCG/ACTUATION NASAL SPRAY    2 sprays (100 mcg total) by Each Nostril route once daily.    GABAPENTIN (NEURONTIN) 300 MG CAPSULE    Take 1 capsule (300mg) by mouth every night X 1 week then increase to 2 capsules (600mg) QHS thereafter. Increase as tolerated.    LANCETS MISC    Check blood glucose levels daily in the AM fasting and 1-2 times more daily.    LEVOCETIRIZINE (XYZAL) 5 MG TABLET    Take 1 tablet (5 mg total) by mouth every evening.    MULTIVITAMIN CAPSULE    Take 1 capsule by mouth once daily.    NITROGLYCERIN  (NITROSTAT) 0.3 MG SL TABLET    PLACE 1 TABLET UNDER THE TONGUE EVERY 5 MINUTES AS NEEDED FOR CHEST PAIN AS DIRECTED    SPIRONOLACTONE (ALDACTONE) 50 MG TABLET    TK 1 T PO D   Modified Medications    Modified Medication Previous Medication    FUROSEMIDE (LASIX) 40 MG TABLET furosemide (LASIX) 40 MG tablet       Take 1 tablet (40 mg total) by mouth once daily. Take extra dose as needed for swelling or weight gain 3 lbs    Take 1 tablet (40 mg total) by mouth 2 (two) times daily.   Discontinued Medications    No medications on file       Review of Systems   Constitutional: Negative.    HENT: Negative.    Eyes: Negative.    Respiratory: Negative.    Cardiovascular: Negative.  Negative for chest pain.   Gastrointestinal: Negative.    Genitourinary: Negative.    Musculoskeletal: Negative.    Skin: Negative.    Neurological: Negative.    Endo/Heme/Allergies: Negative.    Psychiatric/Behavioral: Negative.    All 12 systems otherwise negative.      Wt Readings from Last 3 Encounters:   05/20/20 97 kg (213 lb 15.3 oz)   03/10/20 93 kg (205 lb)   02/10/20 93.2 kg (205 lb 5.7 oz)     Temp Readings from Last 3 Encounters:   05/20/20 98.7 °F (37.1 °C)   02/10/20 97.9 °F (36.6 °C) (Temporal)   01/06/20 97.9 °F (36.6 °C) (Temporal)     BP Readings from Last 3 Encounters:   05/20/20 110/62   03/10/20 123/71   02/10/20 114/64     Pulse Readings from Last 3 Encounters:   05/20/20 84   03/10/20 73   02/10/20 61       There were no vitals taken for this visit.    Objective:   Physical Exam   Constitutional: He is oriented to person, place, and time. He appears well-developed and well-nourished. No distress.   HENT:   Head: Normocephalic.   Mouth/Throat: Oropharynx is clear and moist.   Eyes: Right eye exhibits no discharge. Left eye exhibits no discharge.   Pulmonary/Chest: Effort normal. No respiratory distress.   Neurological: He is alert and oriented to person, place, and time.   Skin: No rash noted. He is not diaphoretic. No  erythema. No pallor.   Psychiatric: His behavior is normal. Judgment and thought content normal.       Lab Results   Component Value Date     04/27/2020    K 4.2 04/27/2020     04/27/2020    CO2 28 04/27/2020    BUN 29 (H) 04/27/2020    CREATININE 1.7 (H) 04/27/2020     (H) 04/27/2020    HGBA1C 6.0 (H) 03/20/2020    MG 1.8 04/27/2020    AST 19 04/27/2020    ALT 14 04/27/2020    ALBUMIN 4.0 04/27/2020    PROT 7.7 04/27/2020    BILITOT 0.5 04/27/2020    WBC 7.67 03/20/2019    HGB 13.6 (L) 03/20/2019    HCT 41.5 03/20/2019    MCV 98 03/20/2019     03/20/2019    INR 1.3 (H) 10/31/2018    TSH 2.181 01/12/2018    CHOL 171 03/20/2020    HDL 38 (L) 03/20/2020    LDLCALC 75.0 03/20/2020    TRIG 290 (H) 03/20/2020    BNP 85 04/27/2020     Assessment:      1. Coronary artery disease involving native coronary artery of native heart without angina pectoris    2. Chronic combined systolic and diastolic congestive heart failure    3. MI, old    4. S/P CABG x 3    5. Essential hypertension    6. Mixed hyperlipidemia    7. Paroxysmal atrial fibrillation    8. Cardiomegaly    9. ICD (implantable cardioverter-defibrillator) in place        Plan:   1. CAD s/p CABG, old MI  - cont meds  - ischemic workup pending, stable     2. CHF EF 40-45%  - cont meds - cont lasix to 40mg daily  - ordered ECHO    3. PAF had episode of PNA  - cont meds    4. NASREEN  - cont CPAP    5. Restricive/obstrucive lung disease  - cont tx per PCP/pulm    6. DM2 6.0   - cont tx per PCP    7. AVB s/p ICD  - cont to monitor  - refer to device clinic    Thank you for allowing me to participate in this patient's care. Please do not hesitate to contact me with any questions or concerns. Consult note has been forwarded to the referral physician.

## 2020-05-27 ENCOUNTER — PATIENT MESSAGE (OUTPATIENT)
Dept: FAMILY MEDICINE | Facility: CLINIC | Age: 80
End: 2020-05-27

## 2020-05-27 ENCOUNTER — PATIENT MESSAGE (OUTPATIENT)
Dept: CARDIOLOGY | Facility: CLINIC | Age: 80
End: 2020-05-27

## 2020-06-01 ENCOUNTER — HOSPITAL ENCOUNTER (OUTPATIENT)
Dept: CARDIOLOGY | Facility: HOSPITAL | Age: 80
Discharge: HOME | End: 2020-06-01
Attending: INTERNAL MEDICINE
Payer: MEDICARE

## 2020-06-01 VITALS
BODY MASS INDEX: 31.55 KG/M2 | SYSTOLIC BLOOD PRESSURE: 128 MMHG | HEIGHT: 69 IN | WEIGHT: 213 LBS | DIASTOLIC BLOOD PRESSURE: 80 MMHG

## 2020-06-01 DIAGNOSIS — I25.10 CORONARY ARTERY DISEASE INVOLVING NATIVE CORONARY ARTERY OF NATIVE HEART WITHOUT ANGINA PECTORIS: ICD-10-CM

## 2020-06-01 DIAGNOSIS — Z95.1 S/P CABG X 3: ICD-10-CM

## 2020-06-01 DIAGNOSIS — Z95.810 ICD (IMPLANTABLE CARDIOVERTER-DEFIBRILLATOR) IN PLACE: ICD-10-CM

## 2020-06-01 DIAGNOSIS — I50.42 CHRONIC COMBINED SYSTOLIC AND DIASTOLIC CONGESTIVE HEART FAILURE: ICD-10-CM

## 2020-06-01 LAB
AORTIC ROOT ANNULUS: 3.09 CM
AV INDEX (PROSTH): 0.65
AV MEAN GRADIENT: 3 MMHG
AV PEAK GRADIENT: 6 MMHG
AV VALVE AREA: 2.08 CM2
AV VELOCITY RATIO: 0.68
BATTERY VOLTAGE (V): 2.95 V
BSA FOR ECHO PROCEDURE: 2.17 M2
CV ECHO LV RWT: 0.47 CM
DOP CALC AO PEAK VEL: 1.21 M/S
DOP CALC AO VTI: 23.17 CM
DOP CALC LVOT AREA: 3.2 CM2
DOP CALC LVOT DIAMETER: 2.01 CM
DOP CALC LVOT PEAK VEL: 0.82 M/S
DOP CALC LVOT STROKE VOLUME: 48.11 CM3
DOP CALCLVOT PEAK VEL VTI: 15.17 CM
E WAVE DECELERATION TIME: 177.49 MSEC
E/A RATIO: 2.68
E/E' RATIO: 11.07 M/S
ECHO LV POSTERIOR WALL: 1.36 CM (ref 0.6–1.1)
ERI (V): 2.77 V
FRACTIONAL SHORTENING: 18 % (ref 28–44)
IMPEDANCE RA LEAD (DONOR): 798 OHMS
IMPEDANCE RA LEAD (NATIVE): 475 OHMS
IMPEDANCE RA LEAD: 437 OHMS
INTERVENTRICULAR SEPTUM: 1.27 CM (ref 0.6–1.1)
IVRT: 107.27 MSEC
LA MAJOR: 6.08 CM
LA MINOR: 5.89 CM
LA WIDTH: 4.21 CM
LEFT ATRIUM SIZE: 5.37 CM
LEFT ATRIUM VOLUME INDEX: 54.2 ML/M2
LEFT ATRIUM VOLUME: 114.98 CM3
LEFT INTERNAL DIMENSION IN SYSTOLE: 4.8 CM (ref 2.1–4)
LEFT VENTRICLE DIASTOLIC VOLUME INDEX: 79.79 ML/M2
LEFT VENTRICLE DIASTOLIC VOLUME: 169.29 ML
LEFT VENTRICLE MASS INDEX: 160 G/M2
LEFT VENTRICLE SYSTOLIC VOLUME INDEX: 50.6 ML/M2
LEFT VENTRICLE SYSTOLIC VOLUME: 107.4 ML
LEFT VENTRICULAR INTERNAL DIMENSION IN DIASTOLE: 5.84 CM (ref 3.5–6)
LEFT VENTRICULAR MASS: 340.45 G
LV LATERAL E/E' RATIO: 9.22 M/S
LV SEPTAL E/E' RATIO: 13.83 M/S
MV PEAK A VEL: 0.31 M/S
MV PEAK E VEL: 0.83 M/S
OHS CV DC PP MS2: 0.4 MS
OHS CV DC PP MS3: 1.5 MS
OHS CV DC PP V2: 2 V
OHS CV DC PP V3: 4.5 V
PISA TR MAX VEL: 2.49 M/S
PV PEAK VELOCITY: 0.82 CM/S
RA MAJOR: 5.35 CM
RA WIDTH: 4.73 CM
RIGHT VENTRICULAR END-DIASTOLIC DIMENSION: 5.44 CM
SINUS: 3.59 CM
STJ: 3.81 CM
TDI LATERAL: 0.09 M/S
TDI SEPTAL: 0.06 M/S
TDI: 0.08 M/S
THRESHOLD MS RA LEAD (NATIVE): 1.5 MS
THRESHOLD MS RA LEAD: 0.4 MS
THRESHOLD V RA LEAD (NATIVE): 3.25 V
THRESHOLD V RA LEAD: 0.75 V
TR MAX PG: 25 MMHG
VV DELAY: 20 MSEC

## 2020-06-01 PROCEDURE — 93281 CARDIAC DEVICE CHECK - IN CLINIC & HOSPITAL: ICD-10-PCS | Mod: 26,HCNC,, | Performed by: INTERNAL MEDICINE

## 2020-06-01 PROCEDURE — 93306 TTE W/DOPPLER COMPLETE: CPT | Mod: 26,HCNC,, | Performed by: INTERNAL MEDICINE

## 2020-06-01 PROCEDURE — 93306 ECHO (CUPID ONLY): ICD-10-PCS | Mod: 26,HCNC,, | Performed by: INTERNAL MEDICINE

## 2020-06-01 PROCEDURE — 93306 TTE W/DOPPLER COMPLETE: CPT | Mod: HCNC

## 2020-06-01 PROCEDURE — 93281 PM DEVICE PROGR EVAL MULTI: CPT | Mod: 26,HCNC,, | Performed by: INTERNAL MEDICINE

## 2020-06-01 PROCEDURE — 93281 PM DEVICE PROGR EVAL MULTI: CPT | Mod: HCNC

## 2020-06-08 DIAGNOSIS — E66.9 DIABETES MELLITUS TYPE 2 IN OBESE: ICD-10-CM

## 2020-06-08 DIAGNOSIS — E11.69 DIABETES MELLITUS TYPE 2 IN OBESE: ICD-10-CM

## 2020-06-08 RX ORDER — LANCETS
EACH MISCELLANEOUS
Qty: 300 EACH | Refills: 3 | Status: SHIPPED | OUTPATIENT
Start: 2020-06-08 | End: 2023-01-01

## 2020-06-08 RX ORDER — GABAPENTIN 300 MG/1
600 CAPSULE ORAL NIGHTLY
Qty: 180 CAPSULE | Refills: 0 | Status: SHIPPED | OUTPATIENT
Start: 2020-06-08 | End: 2020-08-13 | Stop reason: SDUPTHER

## 2020-06-22 ENCOUNTER — PATIENT OUTREACH (OUTPATIENT)
Dept: ADMINISTRATIVE | Facility: OTHER | Age: 80
End: 2020-06-22

## 2020-06-22 NOTE — PROGRESS NOTES
Chart reviewed.   Immunizations: Triggered Imm Registry     Orders placed: n/a  Upcoming appts to satisfy FRANCESCO topics: eye exam

## 2020-06-23 ENCOUNTER — OFFICE VISIT (OUTPATIENT)
Dept: OPHTHALMOLOGY | Facility: CLINIC | Age: 80
End: 2020-06-23
Payer: MEDICARE

## 2020-06-23 DIAGNOSIS — H52.03 HYPEROPIA, BILATERAL: ICD-10-CM

## 2020-06-23 DIAGNOSIS — E11.9 DIABETES MELLITUS TYPE 2 WITHOUT RETINOPATHY: Primary | ICD-10-CM

## 2020-06-23 DIAGNOSIS — H25.13 CATARACT, NUCLEAR SCLEROTIC SENILE, BILATERAL: ICD-10-CM

## 2020-06-23 DIAGNOSIS — H52.4 BILATERAL PRESBYOPIA: ICD-10-CM

## 2020-06-23 PROCEDURE — 99499 RISK ADDL DX/OHS AUDIT: ICD-10-PCS | Mod: HCNC,S$GLB,, | Performed by: OPTOMETRIST

## 2020-06-23 PROCEDURE — 92014 COMPRE OPH EXAM EST PT 1/>: CPT | Mod: HCNC,S$GLB,, | Performed by: OPTOMETRIST

## 2020-06-23 PROCEDURE — 99999 PR PBB SHADOW E&M-EST. PATIENT-LVL III: CPT | Mod: PBBFAC,HCNC,, | Performed by: OPTOMETRIST

## 2020-06-23 PROCEDURE — 92015 DETERMINE REFRACTIVE STATE: CPT | Mod: HCNC,S$GLB,, | Performed by: OPTOMETRIST

## 2020-06-23 PROCEDURE — 99499 UNLISTED E&M SERVICE: CPT | Mod: HCNC,S$GLB,, | Performed by: OPTOMETRIST

## 2020-06-23 PROCEDURE — 99999 PR PBB SHADOW E&M-EST. PATIENT-LVL III: ICD-10-PCS | Mod: PBBFAC,HCNC,, | Performed by: OPTOMETRIST

## 2020-06-23 PROCEDURE — 92015 PR REFRACTION: ICD-10-PCS | Mod: HCNC,S$GLB,, | Performed by: OPTOMETRIST

## 2020-06-23 PROCEDURE — 92014 PR EYE EXAM, EST PATIENT,COMPREHESV: ICD-10-PCS | Mod: HCNC,S$GLB,, | Performed by: OPTOMETRIST

## 2020-06-23 NOTE — PROGRESS NOTES
HPI     NIDDM exam.  Decrease near visual acuity sometimes.  Blurred vision sometimes.  Last eye exam 03/12/2019 TRF  Patient wears OTC readers.   Lab Results       Component                Value               Date                       HGBA1C                   6.0 (H)             03/20/2020            Last edited by Kosta Carballo, OD on 6/23/2020  2:13 PM. (History)            Assessment /Plan     For exam results, see Encounter Report.    Diabetes mellitus type 2 without retinopathy    Cataract, nuclear sclerotic senile, bilateral    Hyperopia, bilateral    Bilateral presbyopia      No Background Diabetic Retinopathy    Moderate cataracts OU, not surgical    Dispense Final Rx for glasses.  RTC 1 year  Discussed above and answered questions.

## 2020-06-24 ENCOUNTER — OFFICE VISIT (OUTPATIENT)
Dept: PAIN MEDICINE | Facility: CLINIC | Age: 80
End: 2020-06-24
Payer: MEDICARE

## 2020-06-24 VITALS
DIASTOLIC BLOOD PRESSURE: 76 MMHG | HEART RATE: 78 BPM | SYSTOLIC BLOOD PRESSURE: 118 MMHG | BODY MASS INDEX: 32.3 KG/M2 | WEIGHT: 218.06 LBS | HEIGHT: 69 IN

## 2020-06-24 DIAGNOSIS — M47.816 LUMBAR SPONDYLOSIS: Primary | ICD-10-CM

## 2020-06-24 DIAGNOSIS — M54.16 LUMBAR RADICULOPATHY: ICD-10-CM

## 2020-06-24 DIAGNOSIS — M51.36 DDD (DEGENERATIVE DISC DISEASE), LUMBAR: ICD-10-CM

## 2020-06-24 PROCEDURE — 99214 OFFICE O/P EST MOD 30 MIN: CPT | Mod: HCNC,S$GLB,, | Performed by: PHYSICAL MEDICINE & REHABILITATION

## 2020-06-24 PROCEDURE — 1101F PR PT FALLS ASSESS DOC 0-1 FALLS W/OUT INJ PAST YR: ICD-10-PCS | Mod: HCNC,CPTII,S$GLB, | Performed by: PHYSICAL MEDICINE & REHABILITATION

## 2020-06-24 PROCEDURE — 99999 PR PBB SHADOW E&M-EST. PATIENT-LVL IV: ICD-10-PCS | Mod: PBBFAC,HCNC,, | Performed by: PHYSICAL MEDICINE & REHABILITATION

## 2020-06-24 PROCEDURE — 3074F SYST BP LT 130 MM HG: CPT | Mod: HCNC,CPTII,S$GLB, | Performed by: PHYSICAL MEDICINE & REHABILITATION

## 2020-06-24 PROCEDURE — 3078F PR MOST RECENT DIASTOLIC BLOOD PRESSURE < 80 MM HG: ICD-10-PCS | Mod: HCNC,CPTII,S$GLB, | Performed by: PHYSICAL MEDICINE & REHABILITATION

## 2020-06-24 PROCEDURE — 1159F MED LIST DOCD IN RCRD: CPT | Mod: HCNC,S$GLB,, | Performed by: PHYSICAL MEDICINE & REHABILITATION

## 2020-06-24 PROCEDURE — 1125F PR PAIN SEVERITY QUANTIFIED, PAIN PRESENT: ICD-10-PCS | Mod: HCNC,S$GLB,, | Performed by: PHYSICAL MEDICINE & REHABILITATION

## 2020-06-24 PROCEDURE — 3074F PR MOST RECENT SYSTOLIC BLOOD PRESSURE < 130 MM HG: ICD-10-PCS | Mod: HCNC,CPTII,S$GLB, | Performed by: PHYSICAL MEDICINE & REHABILITATION

## 2020-06-24 PROCEDURE — 1125F AMNT PAIN NOTED PAIN PRSNT: CPT | Mod: HCNC,S$GLB,, | Performed by: PHYSICAL MEDICINE & REHABILITATION

## 2020-06-24 PROCEDURE — 1101F PT FALLS ASSESS-DOCD LE1/YR: CPT | Mod: HCNC,CPTII,S$GLB, | Performed by: PHYSICAL MEDICINE & REHABILITATION

## 2020-06-24 PROCEDURE — 99214 PR OFFICE/OUTPT VISIT, EST, LEVL IV, 30-39 MIN: ICD-10-PCS | Mod: HCNC,S$GLB,, | Performed by: PHYSICAL MEDICINE & REHABILITATION

## 2020-06-24 PROCEDURE — 99999 PR PBB SHADOW E&M-EST. PATIENT-LVL IV: CPT | Mod: PBBFAC,HCNC,, | Performed by: PHYSICAL MEDICINE & REHABILITATION

## 2020-06-24 PROCEDURE — 3078F DIAST BP <80 MM HG: CPT | Mod: HCNC,CPTII,S$GLB, | Performed by: PHYSICAL MEDICINE & REHABILITATION

## 2020-06-24 PROCEDURE — 1159F PR MEDICATION LIST DOCUMENTED IN MEDICAL RECORD: ICD-10-PCS | Mod: HCNC,S$GLB,, | Performed by: PHYSICAL MEDICINE & REHABILITATION

## 2020-06-24 RX ORDER — TRAMADOL HYDROCHLORIDE 50 MG/1
50 TABLET ORAL EVERY 12 HOURS PRN
Qty: 60 TABLET | Refills: 0 | Status: SHIPPED | OUTPATIENT
Start: 2020-06-24 | End: 2020-07-24

## 2020-06-24 NOTE — PROGRESS NOTES
Established Patient Chronic Pain Note (Follow up visit)    Chief Complaint:   Chief Complaint   Patient presents with    Back Pain    Follow-up       SUBJECTIVE:    Jake Ortiz is a 80 y.o. male who presents to the clinic for a follow-up appointment for Lower back pain. Since the last visit, Jake Ortiz states the pain has been persistant. Current pain intensity is 0/10.  He reports that his pain gets up to about a 5/10 with increased movement and localizes pain to the lower lumbosacral area.  He denies any radicular pain down the lower extremities.    Patient denies night fever/night sweats, urinary incontinence, bowel incontinence, significant weight loss, significant motor weakness and loss of sensations.    Pain Disability Index Review:  Last 3 PDI Scores 3/10/2020 1/31/2020   Pain Disability Index (PDI) 33 27       Interval History (3/10/2020): Patient was seen on 2/10/20. At that time he underwent caudal XUAN.  The patient reports that he is/was unchanged following the procedure.  he reports 15% pain relief.  The changes lasted 2 days.       Interval History (1/31/2020): S/p L5/S1 IL XUAN on 1/6/2020 with limited pain relief for a few days.      Interval History (12/17/19): Patient was seen on 11/22/19. At that time he underwent L5/S1 IL XUAN.  The patient reports that he is/was unchanged following the procedure.  he reports some pain relief for a few days.  He is very active normally and would like to start dancing again.      Initial History of Present Illness:   This patient is a 79 y.o. male who presents today complaining of the above noted pain/s. The patient describes the pain as follows.  Mr. Ortiz is a new patient to clinic with complaints of back pain. Currently rates his pain 0/10 describes was seen aching sensation which is worse with bending and standing and has been somewhat improved with acupuncture.  His symptoms started many years ago he has tried several different types of therapy  including physical therapy, chiropractor, acupuncture, heating pad and ice packs with varying degrees benefit.  He has undergone lumbar surgery in the past however is unsure 1 exact was perform the surgery.  There is no evidence of fusion on x-ray in all disc appeared to be intact. He has undergone medial branch blocks and radiofrequency ablation at an outside pain clinic in April 2019 however he reports that he has received 0 benefit from these procedures.  He has been using [employer] topical cream on lumbar spine which does provide some benefit.  He finds his symptoms are worse with activity such as standing for long periods to wash dishes and cleaning house while his symptoms are completely resolved with sitting and rest.  He denies having any rib radiating symptoms and denies having bowel bladder difficulty. He has used extra-strength Tylenol in the past with no benefit.  He has completed physical therapy in the past and in performs a pretty in depth exercise routine on a daily basis.     Previous Therapy:  Medications:  Extra-strength Tylenol, Klonopin  Injections:  - series of 3 injections (what sounds like ESIs) about 30 years ago with relief  - Lumbar Medial Branch Blocks and Lumbar Radiofrequency Ablation with limited relief  - L5/S1 IL XUAN on 11/22/19 with some pain relief for a few days  - L5/S1 IL XUAN on 1/6/2020 with limited pain relief for a few days (although noticed medication didn't spread well after looking through images with Dr. Trivedi)  - caudal XUAN on 2/10/20 with 15% pain relief x 2 days  Surgeries:  Lumbar surgery with Dr. Powers with complications with staph infection causing 2 subsequent surgeries   Physical Therapy: Completed in the Past    Imaging:   CT Lumbar Spine Without Contrast     Narrative COMPARISON:  Lumbar spine MRI performed on 06/08/2011 and lumbar spine radiographs on 08/08/2011  FINDINGS:  Since the prior MRI examination, development complete osseous fusion the L2 and L3  vertebral bodies.  Laminectomy/laminotomy deformities from L2 through L5.Minimal retrolisthesis at L3 with respect to L4 measuring approximately 4 mm.  At T12-L1 there is moderate degenerative disc disease with vacuum disc phenomenon.  Circumferential disc bulge with mild bilateral facet DJD that result in mild bilateral neural foraminal narrowing and mild spinal canal stenosis.  At L1-L2, there is no significant neural foraminal narrowing or spinal canal stenosis.  At L2-L3, no significant neural foraminal narrowing or spinal canal stenosis.  At L3-L4, there is moderate degenerative disc disease with vacuum disc phenomenon and central disc osteophyte complex.  Indents facet DJD contributes to moderate bilateral neural foraminal narrowing.  At L4-5, there is severe degenerative disc disease with vacuum disc phenomenon.  Advanced facet DJD contributes to moderate left and severe right neural foraminal narrowing is.  At L5-S1, there is mild bilateral neural foraminal narrowing secondary to facet DJD.  Extensive postoperative stranding throughout the posterior.  Spinal tissues.  Ascites is demonstrated in the abdomen.     Impression Multilevel postoperative and degenerative changes as discussed in detail above.  All CT scans at this facility are performed  using dose modulation techniques as appropriate to performed exam including the following:  automated exposure control; adjustment of mA and/or kV according to the patients size (this includes techniques or standardized protocols for targeted exams where dose is matched to indication/reason for exam: i.e. extremities or head);  iterative reconstruction technique.           Results for orders placed in visit on 03/03/11   MRI Lumbar Spine Without Contrast     Narrative RESULTS: THIS STUDY DEMONSTRATES SEVERE DEGENERATIVE END PLATE CHANGES AT L4-5 AND L2-3.  THERE IS A LARGE ANTERIOR OSTEOPHYTE PROJECTING OFF THE END PLATES OF L3, L2, AND L4.  THERE ARE ALSO PROMINENT  DISC BULGES AT L4-5, L3-4, AND L2-3.  THIS STUDY DEMONSTRATES MULTILEVEL FACET ARTHROPATHY.    IMPRESSION: PROMINENT DEGENERATIVE DISC DISEASE AS DESCRIBED IN THE ABOVE PARAGRAPH. THIS STUDY ALSO DEMONSTRATES PROMINENT RIGHT NEURAL FORAMINAL NARROWING AT L4-5 AND LEFT NEURAL FORAMINAL NARROWING AT L4-5.            Results for orders placed during the hospital encounter of 06/08/11   MRI Lumbar Spine W WO Cont     Narrative RESULTS:  Indication:     Back pain patient's had extensive operative intervention   with laminectomy starting at L3 and extending to mid L4. There also   appears to be a right laminectomy at L4-L5. Multiple pedicle screws   apparently have been removed.  L1-L2 level unremarkable.  L2-L3 disc is narrowed there is mild spondylosis with minor facet   arthropathy present. There is some posterior paravertebral edema present   right greater than left extending from right facet lesion of L2-L3. There   is mild central stenosis present but the L2 nerve roots exit without   contact or distortion. There screw artifacts present in the pedicles at   L2 and L3-L4 and L5.  L3-L4 disc is narrowed there is mild spondylosis patient's had   laminectomy there is extensive para spinous muscular edema with fluid   along the left lamina and paraspinous region is also considerable loss of   fat plane within the paravertebral musculature of the spine from L1-L2   down to sacral region.  L4-L5 level is obliterated right laminectomy is present is extensive   edema and loss of fat planes within the paravertebral musculature and   around the posterior and lateral thecal sac.  The L5-S1 disc is intact there is facet arthropathy present.  Postcontrast study demonstrates diffuse enhancement of the  paravertebral   soft tissues starting at approximately the T12 and extending to the   sacral level.  There is diffuse enhancement of the L2-L3 disc with enhancing tissues of   the posterior paraspinous region and minor facet joint  enhancement. There   is also enhancement of the posterior epidural region resulting in some   distortion of the dorsal lateral thecal sac, left greater than right.  The L3-L4 level demonstrates minimal central and left-sided enhancement   of the disc with extensive  enhancement of the paraspinous elements with   enhancement of the posterior and lateral epidural canal. No intradural   enhancement is noted. There is some enhancement around the right residual   facet . A portion of the left facet complex is absent..  The L4-L5 level again demonstrates comparable diffuse paraspinous   enhancement and enhancement surrounding the thecal sac within the central   canal. There is enhancement of the bony elements including the pedicles   and facet complex.  L5-S1 demonstrate some  facet enhancement and posterior perithecal   enhancement  IMPRESSION:       Patient's had extensive operative intervention with removal of   pedicle screws at L2-L3 L4 and L5. There is extensive enhancement of the   operative bed in the posterior epidural region as well is some   enhancement of the scar tissue around the thecal sac. There Is no   intradural or intramedullary enhancement although there is clumping of   the cauda and enhancement of the L2-L3 and L4-L5 disc. There is also   small amount enhancement of the posterior aspect of the L3-L4 disc.   enhancement consistent with gliosis. The various levels of bone   enhancement including the facet complex at L3 and L4 as well as portions   of the pedicles involving L5 involving and adjacent to the screw tracks..           Results for orders placed during the hospital encounter of 07/11/18   CT Lumbar Spine Without Contrast     Narrative COMPARISON:  Lumbar spine MRI performed on 06/08/2011 and lumbar spine radiographs on 08/08/2011  FINDINGS:  Since the prior MRI examination, development complete osseous fusion the L2 and L3 vertebral bodies.  Laminectomy/laminotomy deformities from L2 through  L5.Minimal retrolisthesis at L3 with respect to L4 measuring approximately 4 mm.  At T12-L1 there is moderate degenerative disc disease with vacuum disc phenomenon.  Circumferential disc bulge with mild bilateral facet DJD that result in mild bilateral neural foraminal narrowing and mild spinal canal stenosis.  At L1-L2, there is no significant neural foraminal narrowing or spinal canal stenosis.  At L2-L3, no significant neural foraminal narrowing or spinal canal stenosis.  At L3-L4, there is moderate degenerative disc disease with vacuum disc phenomenon and central disc osteophyte complex.  Indents facet DJD contributes to moderate bilateral neural foraminal narrowing.  At L4-5, there is severe degenerative disc disease with vacuum disc phenomenon.  Advanced facet DJD contributes to moderate left and severe right neural foraminal narrowing is.  At L5-S1, there is mild bilateral neural foraminal narrowing secondary to facet DJD.  Extensive postoperative stranding throughout the posterior.  Spinal tissues.  Ascites is demonstrated in the abdomen.     Impression Multilevel postoperative and degenerative changes as discussed in detail above.  All CT scans at this facility are performed  using dose modulation techniques as appropriate to performed exam including the following:  automated exposure control; adjustment of mA and/or kV according to the patients size (this includes techniques or standardized protocols for targeted exams where dose is matched to indication/reason for exam: i.e. extremities or head);  iterative reconstruction technique.           Results for orders placed during the hospital encounter of 09/25/19   X-Ray Lumbar Spine Complete 5 View     Narrative COMPARISON:  05/30/2018  FINDINGS:  Scoliosis remains.  Vertebral body heights and alignment are stable.  Multilevel advanced degenerative disc height loss and osteophyte formation noted.  Multilevel facet arthropathy present more prevalent at the  lower lumbar spine.  No acute osseous abnormality appreciated.  Aorta iliac atherosclerotic vascular calcifications noted.     Impression Similar appearance of the spine.  Correlate with MRI exam as clinically indicated.           Results for orders placed during the hospital encounter of 05/30/18   X-Ray Lumbar Spine AP And Lateral     Narrative COMPARISON:  08/08/2011  FINDINGS:  Mild-to-moderate dextroscoliosis of the lumbar spine noted.  There is suggestion of possible mild loss of vertebral body height at L5 which may potentially be projectional in nature and was not definitely seen on prior.  Age-indeterminate compression fracture not excluded.  Further characterization could be obtained with MRI as clinically warranted.  Moderate disc height loss from L2-3 through L3-4 appears unchanged.  Multilevel facet arthropathy suspected throughout the lumbar spine.  Posterior elements appear grossly intact. No acute fractures or subluxations are demonstrated.  Visualized osseous structures appear diffusely osteopenic.     Impression As above.           Results for orders placed during the hospital encounter of 08/26/14   X-Ray Cervical Spine AP And Lateral     Narrative Findings: Vertebral alignment is normal.  There is narrowing of the disk spaces and  anterior osteophyte formation C 3-7.  There are no compression fractures or acute abnormalities are seen.  Carotid calcifications are noted bilaterally.     Impression  Advanced degenerative change in the cervical spine.         PMHx,PSHx, Social history, and Family history:  I have reviewed the patient's medical, surgical, social, and family history in detail and updated the computerized patient record.    Review of patient's allergies indicates:  No Known Allergies    Current Outpatient Medications   Medication Sig    acetaminophen (TYLENOL) 500 MG tablet Take 500 mg by mouth every 6 (six) hours as needed for Pain.    aspirin (ECOTRIN) 81 MG EC tablet Take 81 mg by  mouth.    atorvastatin (LIPITOR) 80 MG tablet TK 1 T PO D    blood sugar diagnostic Strp Check blood glucose levels daily in the AM fasting and 1-2 times more daily.    cholecalciferol, vitamin D3, (VITAMIN D3) 1,000 unit capsule Take 5,000 Units by mouth once daily.    clopidogrel (PLAVIX) 75 mg tablet Take 75 mg by mouth once daily.    ENTRESTO 24-26 mg per tablet     fish oil-omega-3 fatty acids 300-1,000 mg capsule Take 1 capsule by mouth once daily.    fluticasone propionate (FLONASE) 50 mcg/actuation nasal spray 2 sprays (100 mcg total) by Each Nostril route once daily.    furosemide (LASIX) 40 MG tablet Take 1 tablet (40 mg total) by mouth once daily. Take extra dose as needed for swelling or weight gain 3 lbs    gabapentin (NEURONTIN) 300 MG capsule Take 2 capsules (600 mg total) by mouth every evening. Can take 3 caps qhs or tid if tolerated, may cause drowsiness    lancets Misc Check blood glucose levels daily in the AM fasting and 1-2 times more daily.    levocetirizine (XYZAL) 5 MG tablet Take 1 tablet (5 mg total) by mouth every evening.    multivitamin capsule Take 1 capsule by mouth once daily.    nitroGLYCERIN (NITROSTAT) 0.3 MG SL tablet PLACE 1 TABLET UNDER THE TONGUE EVERY 5 MINUTES AS NEEDED FOR CHEST PAIN AS DIRECTED    spironolactone (ALDACTONE) 50 MG tablet TK 1 T PO D    blood-glucose meter kit Use as instructed    traMADoL (ULTRAM) 50 mg tablet Take 1 tablet (50 mg total) by mouth every 12 (twelve) hours as needed for Pain. Greater than 7 day supply medically necessary.     No current facility-administered medications for this visit.      Facility-Administered Medications Ordered in Other Visits   Medication    ondansetron injection 4 mg    ondansetron injection 4 mg         REVIEW OF SYSTEMS:    GENERAL:  No weight loss, malaise or fevers.  HEENT:   No recent changes in vision or hearing  NECK:  Negative for lumps, no difficulty with swallowing.  RESPIRATORY:  Negative for  "cough, wheezing or shortness of breath, patient denies any recent URI.  CARDIOVASCULAR:  Negative for chest pain, leg swelling or palpitations.  GI:  Negative for abdominal discomfort, blood in stools or black stools or change in bowel habits.  MUSCULOSKELETAL:  See HPI.  SKIN:  Negative for lesions, rash, and itching.  PSYCH:  No mood disorder or recent psychosocial stressors.  Patients sleep is not disturbed secondary to pain.  HEMATOLOGY/LYMPHOLOGY:  Negative for prolonged bleeding, bruising easily or swollen nodes.  Patient is currently taking anti-coagulants -aspirin and Plavix  NEURO:   No history of headaches, syncope, paralysis, seizures or tremors.  All other reviewed and negative other than HPI.    OBJECTIVE:    /76   Pulse 78   Ht 5' 9" (1.753 m)   Wt 98.9 kg (218 lb 0.6 oz)   BMI 32.20 kg/m²     PHYSICAL EXAMINATION:    GENERAL: Well appearing, in no acute distress, alert and oriented x3.  PSYCH:  Mood and affect appropriate.  SKIN: Skin color, texture, turgor normal, no rashes or lesions.  HEAD/FACE:  Normocephalic, atraumatic. Cranial nerves grossly intact.  NECK: No pain to palpation over the cervical paraspinous muscles. Spurling Negative. No pain with neck flexion, extension, or lateral flexion.   CV: RRR with palpation of the radial artery.  PULM: No evidence of respiratory difficulty, symmetric chest rise.  GI:  Soft and non-tender.  BACK: Straight leg raising in the sitting and supine positions is negative to radicular pain. No pain to palpation over the facet joints of the lumbar spine or spinous processes. Normal range of motion without pain reproduction.  EXTREMITIES: Peripheral joint ROM is full and pain free without obvious instability or laxity in all four extremities. No deformities, edema, or skin discoloration. Good capillary refill.  MUSCULOSKELETAL: Shoulder, hip, and knee provocative maneuvers are negative.  There is no pain with palpation over the sacroiliac joints " bilaterally.  FABERs test is negative.  FADIRs test is negative.   Bilateral upper and lower extremity strength is normal and symmetric.  No atrophy or tone abnormalities are noted.  NEURO: Bilateral upper and lower extremity coordination and muscle stretch reflexes are physiologic and symmetric.  Plantar response are downgoing. No clonus.  No loss of sensation is noted.  GAIT: normal.      LABS:  Lab Results   Component Value Date    WBC 7.67 03/20/2019    HGB 13.6 (L) 03/20/2019    HCT 41.5 03/20/2019    MCV 98 03/20/2019     03/20/2019       CMP  Sodium   Date Value Ref Range Status   04/27/2020 140 136 - 145 mmol/L Final     Potassium   Date Value Ref Range Status   04/27/2020 4.2 3.5 - 5.1 mmol/L Final     Chloride   Date Value Ref Range Status   04/27/2020 101 95 - 110 mmol/L Final     CO2   Date Value Ref Range Status   04/27/2020 28 23 - 29 mmol/L Final     Glucose   Date Value Ref Range Status   04/27/2020 131 (H) 70 - 110 mg/dL Final     BUN, Bld   Date Value Ref Range Status   04/27/2020 29 (H) 8 - 23 mg/dL Final     Creatinine   Date Value Ref Range Status   04/27/2020 1.7 (H) 0.5 - 1.4 mg/dL Final     Calcium   Date Value Ref Range Status   04/27/2020 9.6 8.7 - 10.5 mg/dL Final     Total Protein   Date Value Ref Range Status   04/27/2020 7.7 6.0 - 8.4 g/dL Final     Albumin   Date Value Ref Range Status   04/27/2020 4.0 3.5 - 5.2 g/dL Final     Total Bilirubin   Date Value Ref Range Status   04/27/2020 0.5 0.1 - 1.0 mg/dL Final     Comment:     For infants and newborns, interpretation of results should be based  on gestational age, weight and in agreement with clinical  observations.  Premature Infant recommended reference ranges:  Up to 24 hours.............<8.0 mg/dL  Up to 48 hours............<12.0 mg/dL  3-5 days..................<15.0 mg/dL  6-29 days.................<15.0 mg/dL       Alkaline Phosphatase   Date Value Ref Range Status   04/27/2020 68 55 - 135 U/L Final     AST   Date Value  Ref Range Status   04/27/2020 19 10 - 40 U/L Final     ALT   Date Value Ref Range Status   04/27/2020 14 10 - 44 U/L Final     Anion Gap   Date Value Ref Range Status   04/27/2020 11 8 - 16 mmol/L Final     eGFR if    Date Value Ref Range Status   04/27/2020 44.0 (A) >60 mL/min/1.73 m^2 Final     eGFR if non    Date Value Ref Range Status   04/27/2020 38.1 (A) >60 mL/min/1.73 m^2 Final     Comment:     Calculation used to obtain the estimated glomerular filtration  rate (eGFR) is the CKD-EPI equation.          Lab Results   Component Value Date    HGBA1C 6.0 (H) 03/20/2020             ASSESSMENT: 80 y.o. year old male with  Lower back pain, consistent with     1. Lumbar spondylosis     2. Lumbar radiculopathy     3. DDD (degenerative disc disease), lumbar           PLAN:   - Interventions: None at this time as previous interventional procedures did not provide significant or long-lasting relief.   - Anticoagulation: yes, aspirin and Plavix  - Medications: I have stressed the importance of physical activity and a home exercise plan to help with pain and improve health.  We will start tramadol 50 mg b.i.d. p.r.n. severe pain as interventional therapies have not been successful in alleviating his pain.  NSAIDs are contraindicated due to his medical comorbidities. An opioid pain contract was completed today after having an extensive conversation about chronic opioid use for pain management. We discussed the risks and benefits of opioids.  I discouraged the patient from escalation of opioid use and try to minimize its use to decrease chance of dependence, tolerance, and opioid-based hyperalgesia.  I reviewed the  in great detail and there are no inconsistencies and it is appropriate with patient's history.  There are no signs of aberrant drug behavior.  The opioid risk tool was also completed.  Pt counseled about the side effects of long term use of opioids including dependence,  tolerance, addiction, respiratory depression, somnelence , immune and endocrine dysfunction.  The patient expressed understanding.  Will provide tramadol 50 mg Q 12 p.r.n. pain, # 60 tablets, 0 refills.    - Therapy:  Advised patient continue with activities and exercises as tolerated  - Labs:  Reviewed  - Imaging:  Reviewed imaging available  - Consults/Referrals:  None at this time  - Records:  Reviewed/Obtain old records from outside physicians and imaging  - Follow up visit: return to clinic in 4 weeks to determine benefit from tramadol  - Counseled patient regarding the importance of activity modification and physical therapy  - This condition does not require this patient to take time off of work, and the primary goal of our Pain Management services is to improve the patient's functional capacity.  - Patient Questions: Answered all of the patient's questions regarding diagnosis, therapy, and treatment    The above plan and management options were discussed at length with patient. Patient is in agreement with the above and verbalized understanding.      Tacho Trivedi MD  Interventional Pain Management  [facility]    Disclaimer:  This note was prepared using voice recognition system and is likely to have sound alike errors that may have been overlooked even after proof reading.  Please call me with any questions

## 2020-07-15 DIAGNOSIS — I25.10 CORONARY ARTERY DISEASE INVOLVING NATIVE CORONARY ARTERY OF NATIVE HEART WITHOUT ANGINA PECTORIS: Primary | ICD-10-CM

## 2020-07-16 ENCOUNTER — HOSPITAL ENCOUNTER (OUTPATIENT)
Dept: CARDIOLOGY | Facility: HOSPITAL | Age: 80
Discharge: HOME | End: 2020-07-16
Attending: INTERNAL MEDICINE
Payer: MEDICARE

## 2020-07-16 ENCOUNTER — OFFICE VISIT (OUTPATIENT)
Dept: CARDIOLOGY | Facility: CLINIC | Age: 80
End: 2020-07-16
Payer: MEDICARE

## 2020-07-16 VITALS
HEART RATE: 79 BPM | BODY MASS INDEX: 32.26 KG/M2 | RESPIRATION RATE: 18 BRPM | OXYGEN SATURATION: 98 % | WEIGHT: 217.81 LBS | DIASTOLIC BLOOD PRESSURE: 70 MMHG | SYSTOLIC BLOOD PRESSURE: 148 MMHG | HEIGHT: 69 IN

## 2020-07-16 DIAGNOSIS — I25.10 CORONARY ARTERY DISEASE INVOLVING NATIVE CORONARY ARTERY OF NATIVE HEART WITHOUT ANGINA PECTORIS: ICD-10-CM

## 2020-07-16 DIAGNOSIS — Z95.1 S/P CABG X 3: ICD-10-CM

## 2020-07-16 DIAGNOSIS — I50.42 CHRONIC COMBINED SYSTOLIC AND DIASTOLIC CONGESTIVE HEART FAILURE: ICD-10-CM

## 2020-07-16 DIAGNOSIS — I25.2 MI, OLD: ICD-10-CM

## 2020-07-16 DIAGNOSIS — I25.118 CORONARY ARTERY DISEASE OF NATIVE ARTERY OF NATIVE HEART WITH STABLE ANGINA PECTORIS: Primary | ICD-10-CM

## 2020-07-16 DIAGNOSIS — Z95.810 ICD (IMPLANTABLE CARDIOVERTER-DEFIBRILLATOR) IN PLACE: ICD-10-CM

## 2020-07-16 DIAGNOSIS — I48.0 PAROXYSMAL ATRIAL FIBRILLATION: ICD-10-CM

## 2020-07-16 DIAGNOSIS — I10 ESSENTIAL HYPERTENSION: ICD-10-CM

## 2020-07-16 DIAGNOSIS — I70.0 ARTERIOSCLEROSIS OF AORTA: ICD-10-CM

## 2020-07-16 DIAGNOSIS — E78.2 MIXED HYPERLIPIDEMIA: ICD-10-CM

## 2020-07-16 PROCEDURE — 1159F MED LIST DOCD IN RCRD: CPT | Mod: HCNC,S$GLB,, | Performed by: INTERNAL MEDICINE

## 2020-07-16 PROCEDURE — 3077F SYST BP >= 140 MM HG: CPT | Mod: HCNC,CPTII,S$GLB, | Performed by: INTERNAL MEDICINE

## 2020-07-16 PROCEDURE — 99214 OFFICE O/P EST MOD 30 MIN: CPT | Mod: HCNC,25,S$GLB, | Performed by: INTERNAL MEDICINE

## 2020-07-16 PROCEDURE — 1101F PT FALLS ASSESS-DOCD LE1/YR: CPT | Mod: HCNC,CPTII,S$GLB, | Performed by: INTERNAL MEDICINE

## 2020-07-16 PROCEDURE — 3078F PR MOST RECENT DIASTOLIC BLOOD PRESSURE < 80 MM HG: ICD-10-PCS | Mod: HCNC,CPTII,S$GLB, | Performed by: INTERNAL MEDICINE

## 2020-07-16 PROCEDURE — 3078F DIAST BP <80 MM HG: CPT | Mod: HCNC,CPTII,S$GLB, | Performed by: INTERNAL MEDICINE

## 2020-07-16 PROCEDURE — 3077F PR MOST RECENT SYSTOLIC BLOOD PRESSURE >= 140 MM HG: ICD-10-PCS | Mod: HCNC,CPTII,S$GLB, | Performed by: INTERNAL MEDICINE

## 2020-07-16 PROCEDURE — 93010 ELECTROCARDIOGRAM REPORT: CPT | Mod: HCNC,,, | Performed by: INTERNAL MEDICINE

## 2020-07-16 PROCEDURE — 99499 UNLISTED E&M SERVICE: CPT | Mod: HCNC,S$GLB,, | Performed by: INTERNAL MEDICINE

## 2020-07-16 PROCEDURE — 1101F PR PT FALLS ASSESS DOC 0-1 FALLS W/OUT INJ PAST YR: ICD-10-PCS | Mod: HCNC,CPTII,S$GLB, | Performed by: INTERNAL MEDICINE

## 2020-07-16 PROCEDURE — 99499 RISK ADDL DX/OHS AUDIT: ICD-10-PCS | Mod: HCNC,S$GLB,, | Performed by: INTERNAL MEDICINE

## 2020-07-16 PROCEDURE — 93010 EKG 12-LEAD: ICD-10-PCS | Mod: HCNC,,, | Performed by: INTERNAL MEDICINE

## 2020-07-16 PROCEDURE — 99999 PR PBB SHADOW E&M-EST. PATIENT-LVL IV: CPT | Mod: PBBFAC,HCNC,, | Performed by: INTERNAL MEDICINE

## 2020-07-16 PROCEDURE — 99214 PR OFFICE/OUTPT VISIT, EST, LEVL IV, 30-39 MIN: ICD-10-PCS | Mod: HCNC,25,S$GLB, | Performed by: INTERNAL MEDICINE

## 2020-07-16 PROCEDURE — 1159F PR MEDICATION LIST DOCUMENTED IN MEDICAL RECORD: ICD-10-PCS | Mod: HCNC,S$GLB,, | Performed by: INTERNAL MEDICINE

## 2020-07-16 PROCEDURE — 99999 PR PBB SHADOW E&M-EST. PATIENT-LVL IV: ICD-10-PCS | Mod: PBBFAC,HCNC,, | Performed by: INTERNAL MEDICINE

## 2020-07-16 PROCEDURE — 93005 ELECTROCARDIOGRAM TRACING: CPT | Mod: HCNC

## 2020-07-16 NOTE — PROGRESS NOTES
Subjective:   Patient ID:  Jake Ortiz is a 80 y.o. male who presents for cardiac consult of Follow-up          Follow-up  Pertinent negatives include no chest pain.     The patient came in today for cardiac consult of Follow-up    Jake Ortiz is a 80 y.o. 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    4/21/20  Pt seen at [facility] last by CARMELINA Spencer 9/18/19. Overall feels great for the most part. He has been started to gain some water weight in the past, he had been on Entreso by Dr. Menendez, lost 173 lbs. He had paracentesis in past was told due to CHF. His weight started coming back up to 208 lbs and then was gaining weight and he has doubled his meds - Entresto. Otherwise feels well has good energy. He changed here due to insurance.   BP - 108/67, pulse 62, oxygen 98%, weight 209 lbs, blood sugar 127  Reviewed prior chart from [facility]    5/22/20  BNP was neg after last visit, Cr slightly increased with BUN elevated told to take lasix daily and PRN extra. He saw Dr. Holley yesterday, was given Flonase breathing improved.   /120s systolics, 67 diastolic, pulse 60s. . Overall doing will. Needs device clinic.     7/16/20  ECHO with EF 50%, mild to mod MR/TR. Pt had trouble getting Entresto filled due to being in donut hole but patient assistance form filled out.   He has significant back pain. He is euvolemic, no CP/SOB.   ECG - bi V paced    Patient feels no chest pain, no sob, no leg swelling, no PND, no palpitation, no dizziness, no syncope, no CNS symptoms.    Patient has fairly good exercise tolerance.    Patient is compliant with medications.    Results for orders placed during the hospital encounter of 06/01/20   Echo Color Flow Doppler? Yes    Narrative · Concentric left ventricular hypertrophy.  · Left ventricular systolic function. The estimated  ejection fraction is   50%.  · Indeterminate left ventricular diastolic function.  · Severe left atrial enlargement.  · Mild-to-moderate mitral regurgitation.  · Mild to moderate tricuspid regurgitation.            Past Medical History:   Diagnosis Date    Abnormal PFT     Anemia     Arthritis     Atrial fibrillation 10/19/2017    Back pain     Congestive heart failure 3/5/2018    Coronary artery disease     Diabetes mellitus 2018     am 2018    DM (diabetes mellitus)      am 2020    Hyperlipemia     Hypertension     Myocardial infarction     Obesity     NASREEN (obstructive sleep apnea) 2018    Prostate cancer 2015    Tobacco dependence     Type 2 diabetes mellitus        Past Surgical History:   Procedure Laterality Date    CORONARY ARTERY BYPASS GRAFT      EPIDURAL STEROID INJECTION N/A 2019    Procedure: Lumbar L5/S1 IL XUAN;  Surgeon: Tacho Trivedi MD;  Location: [facility];  Service: Pain Management;  Laterality: N/A;    EPIDURAL STEROID INJECTION N/A 2020    Procedure: Lumbar L5/S1 IL XUAN;  Surgeon: Tacho Trivedi MD;  Location: [facility];  Service: Pain Management;  Laterality: N/A;    EPIDURAL STEROID INJECTION N/A 2/10/2020    Procedure: Caudal XUAN;  Surgeon: Tacho Trivedi MD;  Location: [facility];  Service: Pain Management;  Laterality: N/A;    PROSTATE SURGERY      prostate radiation    SPINE SURGERY      fusion    TONSILLECTOMY         Social History     Tobacco Use    Smoking status: Former Smoker     Packs/day: 1.50     Years: 20.00     Pack years: 30.00     Types: Cigarettes     Start date:      Quit date:      Years since quittin.5    Smokeless tobacco: Never Used   Substance Use Topics    Alcohol use: No     Frequency: Never     Binge frequency: Never    Drug use: No       Family History   Problem Relation Age of Onset    Heart attack Mother     Diabetes Mother     Heart disease Mother      Cataracts Mother     Stroke Father     Heart disease Father     Heart disease Brother        Patient's Medications   New Prescriptions    No medications on file   Previous Medications    ACETAMINOPHEN (TYLENOL) 500 MG TABLET    Take 500 mg by mouth every 6 (six) hours as needed for Pain.    ASPIRIN (ECOTRIN) 81 MG EC TABLET    Take 81 mg by mouth.    ATORVASTATIN (LIPITOR) 80 MG TABLET    TK 1 T PO D    BLOOD SUGAR DIAGNOSTIC STRP    Check blood glucose levels daily in the AM fasting and 1-2 times more daily.    BLOOD-GLUCOSE METER KIT    Use as instructed    CHOLECALCIFEROL, VITAMIN D3, (VITAMIN D3) 1,000 UNIT CAPSULE    Take 5,000 Units by mouth once daily.    CLOPIDOGREL (PLAVIX) 75 MG TABLET    Take 75 mg by mouth once daily.    ENTRESTO 24-26 MG PER TABLET        FISH OIL-OMEGA-3 FATTY ACIDS 300-1,000 MG CAPSULE    Take 1 capsule by mouth once daily.    FLUTICASONE PROPIONATE (FLONASE) 50 MCG/ACTUATION NASAL SPRAY    2 sprays (100 mcg total) by Each Nostril route once daily.    FUROSEMIDE (LASIX) 40 MG TABLET    Take 1 tablet (40 mg total) by mouth once daily. Take extra dose as needed for swelling or weight gain 3 lbs    GABAPENTIN (NEURONTIN) 300 MG CAPSULE    Take 2 capsules (600 mg total) by mouth every evening. Can take 3 caps qhs or tid if tolerated, may cause drowsiness    LANCETS MISC    Check blood glucose levels daily in the AM fasting and 1-2 times more daily.    LEVOCETIRIZINE (XYZAL) 5 MG TABLET    Take 1 tablet (5 mg total) by mouth every evening.    MULTIVITAMIN CAPSULE    Take 1 capsule by mouth once daily.    NITROGLYCERIN (NITROSTAT) 0.3 MG SL TABLET    PLACE 1 TABLET UNDER THE TONGUE EVERY 5 MINUTES AS NEEDED FOR CHEST PAIN AS DIRECTED    SPIRONOLACTONE (ALDACTONE) 50 MG TABLET    TK 1 T PO D    TRAMADOL (ULTRAM) 50 MG TABLET    Take 1 tablet (50 mg total) by mouth every 12 (twelve) hours as needed for Pain. Greater than 7 day supply medically necessary.   Modified Medications    No  "medications on file   Discontinued Medications    No medications on file       Review of Systems   Constitutional: Negative.    HENT: Negative.    Eyes: Negative.    Respiratory: Negative.    Cardiovascular: Negative.  Negative for chest pain.   Gastrointestinal: Negative.    Genitourinary: Negative.    Musculoskeletal: Negative.    Skin: Negative.    Neurological: Negative.    Endo/Heme/Allergies: Negative.    Psychiatric/Behavioral: Negative.    All 12 systems otherwise negative.      Wt Readings from Last 3 Encounters:   07/16/20 98.8 kg (217 lb 13 oz)   06/24/20 98.9 kg (218 lb 0.6 oz)   06/01/20 96.6 kg (213 lb)     Temp Readings from Last 3 Encounters:   05/20/20 98.7 °F (37.1 °C)   02/10/20 97.9 °F (36.6 °C) (Temporal)   01/06/20 97.9 °F (36.6 °C) (Temporal)     BP Readings from Last 3 Encounters:   07/16/20 (!) 148/70   06/24/20 118/76   06/01/20 128/80     Pulse Readings from Last 3 Encounters:   07/16/20 79   06/24/20 78   05/20/20 84       BP (!) 148/70 (BP Location: Left arm, Patient Position: Sitting, BP Method: Medium (Manual))   Pulse 79   Resp 18   Ht 5' 9" (1.753 m)   Wt 98.8 kg (217 lb 13 oz)   SpO2 98%   BMI 32.17 kg/m²     Objective:   Physical Exam   Constitutional: He is oriented to person, place, and time. He appears well-developed and well-nourished. No distress.   HENT:   Head: Normocephalic and atraumatic.   Nose: Nose normal.   Mouth/Throat: Oropharynx is clear and moist.   Eyes: Conjunctivae and EOM are normal. Right eye exhibits no discharge. Left eye exhibits no discharge. No scleral icterus.   Neck: Normal range of motion. Neck supple. No JVD present. No thyromegaly present.   Cardiovascular: Normal rate, regular rhythm, S1 normal and S2 normal. Exam reveals no gallop, no S3, no S4 and no friction rub.   Murmur heard.  Pulmonary/Chest: Effort normal and breath sounds normal. No stridor. No respiratory distress. He has no wheezes. He has no rales. He exhibits no tenderness. "   Abdominal: Soft. Bowel sounds are normal. He exhibits no distension and no mass. There is no abdominal tenderness. There is no rebound.   Genitourinary:    Genitourinary Comments: Deferred     Musculoskeletal: Normal range of motion.         General: No tenderness, deformity or edema.   Lymphadenopathy:     He has no cervical adenopathy.   Neurological: He is alert and oriented to person, place, and time. He exhibits normal muscle tone. Coordination normal.   Skin: Skin is warm and dry. No rash noted. He is not diaphoretic. No erythema. No pallor.   Psychiatric: He has a normal mood and affect. His behavior is normal. Judgment and thought content normal.   Nursing note and vitals reviewed.      Lab Results   Component Value Date     04/27/2020    K 4.2 04/27/2020     04/27/2020    CO2 28 04/27/2020    BUN 29 (H) 04/27/2020    CREATININE 1.7 (H) 04/27/2020     (H) 04/27/2020    HGBA1C 6.0 (H) 03/20/2020    MG 1.8 04/27/2020    AST 19 04/27/2020    ALT 14 04/27/2020    ALBUMIN 4.0 04/27/2020    PROT 7.7 04/27/2020    BILITOT 0.5 04/27/2020    WBC 7.67 03/20/2019    HGB 13.6 (L) 03/20/2019    HCT 41.5 03/20/2019    MCV 98 03/20/2019     03/20/2019    INR 1.3 (H) 10/31/2018    TSH 2.181 01/12/2018    CHOL 171 03/20/2020    HDL 38 (L) 03/20/2020    LDLCALC 75.0 03/20/2020    TRIG 290 (H) 03/20/2020    BNP 85 04/27/2020     Assessment:      1. Coronary artery disease of native artery of native heart with stable angina pectoris    2. S/P CABG x 3    3. MI, old    4. Mixed hyperlipidemia    5. Essential hypertension    6. Chronic combined systolic and diastolic congestive heart failure    7. ICD (implantable cardioverter-defibrillator) in place    8. Arteriosclerosis of aorta    9. Paroxysmal atrial fibrillation        Plan:   1. CAD s/p CABG, old MI  - cont meds  - stable    2. HFPEF ith TR/MR, improved EF  - cont meds - cont lasix to 40mg daily  - cont to monitor valve disease  - will change  Entresto to Losartan    3. PAF had episode of PNA  - cont meds    4. NASREEN  - cont CPAP    5. Restricive/obstrucive lung disease  - cont tx per PCP/pulm    6. DM2 6.0   - cont tx per PCP    7. AVB s/p ICD  - cont to monitor  - refer to device clinic    Thank you for allowing me to participate in this patient's care. Please do not hesitate to contact me with any questions or concerns. Consult note has been forwarded to the referral physician.

## 2020-07-22 ENCOUNTER — HOSPITAL ENCOUNTER (OUTPATIENT)
Dept: CARDIOLOGY | Facility: HOSPITAL | Age: 80
Discharge: HOME | End: 2020-07-22
Attending: INTERNAL MEDICINE
Payer: MEDICARE

## 2020-07-22 ENCOUNTER — TELEPHONE (OUTPATIENT)
Dept: PAIN MEDICINE | Facility: CLINIC | Age: 80
End: 2020-07-22

## 2020-07-22 ENCOUNTER — TELEPHONE (OUTPATIENT)
Dept: CARDIOLOGY | Facility: CLINIC | Age: 80
End: 2020-07-22

## 2020-07-22 ENCOUNTER — OFFICE VISIT (OUTPATIENT)
Dept: PAIN MEDICINE | Facility: CLINIC | Age: 80
End: 2020-07-22
Payer: MEDICARE

## 2020-07-22 VITALS
BODY MASS INDEX: 31.72 KG/M2 | HEART RATE: 68 BPM | WEIGHT: 214.19 LBS | DIASTOLIC BLOOD PRESSURE: 76 MMHG | SYSTOLIC BLOOD PRESSURE: 133 MMHG | HEIGHT: 69 IN

## 2020-07-22 DIAGNOSIS — I48.0 PAF (PAROXYSMAL ATRIAL FIBRILLATION): Primary | ICD-10-CM

## 2020-07-22 DIAGNOSIS — M47.816 LUMBAR SPONDYLOSIS: Primary | ICD-10-CM

## 2020-07-22 DIAGNOSIS — Z95.810 ICD (IMPLANTABLE CARDIOVERTER-DEFIBRILLATOR) IN PLACE: ICD-10-CM

## 2020-07-22 DIAGNOSIS — M51.36 DDD (DEGENERATIVE DISC DISEASE), LUMBAR: ICD-10-CM

## 2020-07-22 DIAGNOSIS — M54.16 LUMBAR RADICULOPATHY: ICD-10-CM

## 2020-07-22 PROCEDURE — 93281 CARDIAC DEVICE CHECK - IN CLINIC & HOSPITAL: ICD-10-PCS | Mod: 26,HCNC,, | Performed by: INTERNAL MEDICINE

## 2020-07-22 PROCEDURE — 1101F PT FALLS ASSESS-DOCD LE1/YR: CPT | Mod: HCNC,CPTII,S$GLB, | Performed by: PHYSICAL MEDICINE & REHABILITATION

## 2020-07-22 PROCEDURE — 93281 PM DEVICE PROGR EVAL MULTI: CPT | Mod: 26,HCNC,, | Performed by: INTERNAL MEDICINE

## 2020-07-22 PROCEDURE — 99999 PR PBB SHADOW E&M-EST. PATIENT-LVL V: CPT | Mod: PBBFAC,HCNC,, | Performed by: PHYSICAL MEDICINE & REHABILITATION

## 2020-07-22 PROCEDURE — 3078F DIAST BP <80 MM HG: CPT | Mod: HCNC,CPTII,S$GLB, | Performed by: PHYSICAL MEDICINE & REHABILITATION

## 2020-07-22 PROCEDURE — 3075F PR MOST RECENT SYSTOLIC BLOOD PRESS GE 130-139MM HG: ICD-10-PCS | Mod: HCNC,CPTII,S$GLB, | Performed by: PHYSICAL MEDICINE & REHABILITATION

## 2020-07-22 PROCEDURE — 3078F PR MOST RECENT DIASTOLIC BLOOD PRESSURE < 80 MM HG: ICD-10-PCS | Mod: HCNC,CPTII,S$GLB, | Performed by: PHYSICAL MEDICINE & REHABILITATION

## 2020-07-22 PROCEDURE — 99214 PR OFFICE/OUTPT VISIT, EST, LEVL IV, 30-39 MIN: ICD-10-PCS | Mod: HCNC,S$GLB,, | Performed by: PHYSICAL MEDICINE & REHABILITATION

## 2020-07-22 PROCEDURE — 99999 PR PBB SHADOW E&M-EST. PATIENT-LVL V: ICD-10-PCS | Mod: PBBFAC,HCNC,, | Performed by: PHYSICAL MEDICINE & REHABILITATION

## 2020-07-22 PROCEDURE — 1126F PR PAIN SEVERITY QUANTIFIED, NO PAIN PRESENT: ICD-10-PCS | Mod: HCNC,S$GLB,, | Performed by: PHYSICAL MEDICINE & REHABILITATION

## 2020-07-22 PROCEDURE — 1126F AMNT PAIN NOTED NONE PRSNT: CPT | Mod: HCNC,S$GLB,, | Performed by: PHYSICAL MEDICINE & REHABILITATION

## 2020-07-22 PROCEDURE — 3075F SYST BP GE 130 - 139MM HG: CPT | Mod: HCNC,CPTII,S$GLB, | Performed by: PHYSICAL MEDICINE & REHABILITATION

## 2020-07-22 PROCEDURE — 99214 OFFICE O/P EST MOD 30 MIN: CPT | Mod: HCNC,S$GLB,, | Performed by: PHYSICAL MEDICINE & REHABILITATION

## 2020-07-22 PROCEDURE — 1159F PR MEDICATION LIST DOCUMENTED IN MEDICAL RECORD: ICD-10-PCS | Mod: HCNC,S$GLB,, | Performed by: PHYSICAL MEDICINE & REHABILITATION

## 2020-07-22 PROCEDURE — 93281 PM DEVICE PROGR EVAL MULTI: CPT | Mod: HCNC

## 2020-07-22 PROCEDURE — 1101F PR PT FALLS ASSESS DOC 0-1 FALLS W/OUT INJ PAST YR: ICD-10-PCS | Mod: HCNC,CPTII,S$GLB, | Performed by: PHYSICAL MEDICINE & REHABILITATION

## 2020-07-22 PROCEDURE — 1159F MED LIST DOCD IN RCRD: CPT | Mod: HCNC,S$GLB,, | Performed by: PHYSICAL MEDICINE & REHABILITATION

## 2020-07-22 NOTE — TELEPHONE ENCOUNTER
Spoke with patient let patient know he was in afib for device check and Dr. Hood is starting eliquis 5 mg bid. Patient states understanding and will  meds from pharmacy.

## 2020-07-22 NOTE — PROGRESS NOTES
Established Patient Chronic Pain Note (Follow up visit)    Chief Complaint:   Chief Complaint   Patient presents with    Follow-up    Muscle Pain     R buttock       SUBJECTIVE:    Jake Ortiz is a 80 y.o. male who presents to the clinic for a follow-up appointment for Lower back pain.  At the last visit, and opioid pain contract was completed in started him on tramadol 50 mg Q 12 p.r.n. pain.  Since the last visit, Jake Ortiz states the pain has been persistent.  He reports that the tramadol did not provide significant pain relief and is not interested in medication management at this time.  Current pain intensity is 0/10.  He reports that his pain gets up to about a 5/10 with increased movement and localizes pain to the lower lumbosacral area.  He denies any radicular pain down the lower extremities.    Patient denies night fever/night sweats, urinary incontinence, bowel incontinence, significant weight loss, significant motor weakness and loss of sensations.    Pain Disability Index Review:  Last 3 PDI Scores 3/10/2020 1/31/2020   Pain Disability Index (PDI) 33 27     Interval history (06/24/2020): Jake Ortiz is a 80 y.o. male who presents to the clinic for a follow-up appointment for Lower back pain. Since the last visit, Jake Ortiz states the pain has been persistant. Current pain intensity is 0/10.  He reports that his pain gets up to about a 5/10 with increased movement and localizes pain to the lower lumbosacral area.  He denies any radicular pain down the lower extremities.    Interval History (3/10/2020): Patient was seen on 2/10/20. At that time he underwent caudal XUAN.  The patient reports that he is/was unchanged following the procedure.  he reports 15% pain relief.  The changes lasted 2 days.       Interval History (1/31/2020): S/p L5/S1 IL XUAN on 1/6/2020 with limited pain relief for a few days.      Interval History (12/17/19): Patient was seen on 11/22/19. At that time he underwent  L5/S1 IL XUAN.  The patient reports that he is/was unchanged following the procedure.  he reports some pain relief for a few days.  He is very active normally and would like to start dancing again.      Initial History of Present Illness:   This patient is a 79 y.o. male who presents today complaining of the above noted pain/s. The patient describes the pain as follows.  Mr. Ortiz is a new patient to clinic with complaints of back pain. Currently rates his pain 0/10 describes was seen aching sensation which is worse with bending and standing and has been somewhat improved with acupuncture.  His symptoms started many years ago he has tried several different types of therapy including physical therapy, chiropractor, acupuncture, heating pad and ice packs with varying degrees benefit.  He has undergone lumbar surgery in the past however is unsure 1 exact was perform the surgery.  There is no evidence of fusion on x-ray in all disc appeared to be intact. He has undergone medial branch blocks and radiofrequency ablation at an outside pain clinic in April 2019 however he reports that he has received 0 benefit from these procedures.  He has been using [employer] topical cream on lumbar spine which does provide some benefit.  He finds his symptoms are worse with activity such as standing for long periods to wash dishes and cleaning house while his symptoms are completely resolved with sitting and rest.  He denies having any rib radiating symptoms and denies having bowel bladder difficulty. He has used extra-strength Tylenol in the past with no benefit.  He has completed physical therapy in the past and in performs a pretty in depth exercise routine on a daily basis.     Previous Therapy:  Medications:  Extra-strength Tylenol, Klonopin  Injections:  - series of 3 injections (what sounds like ESIs) about 30 years ago with relief  - Lumbar Medial Branch Blocks and Lumbar Radiofrequency Ablation with limited relief  - L5/S1  IL XUAN on 11/22/19 with some pain relief for a few days  - L5/S1 IL XUAN on 1/6/2020 with limited pain relief for a few days (although noticed medication didn't spread well after looking through images with Dr. Trivedi)  - caudal XUAN on 2/10/20 with 15% pain relief x 2 days  Surgeries:  Lumbar surgery with Dr. Powers with complications with staph infection causing 2 subsequent surgeries       Physical Therapy: Completed in the Past, but none recently      :       Imaging:   CT Lumbar Spine Without Contrast     Narrative COMPARISON:  Lumbar spine MRI performed on 06/08/2011 and lumbar spine radiographs on 08/08/2011  FINDINGS:  Since the prior MRI examination, development complete osseous fusion the L2 and L3 vertebral bodies.  Laminectomy/laminotomy deformities from L2 through L5.Minimal retrolisthesis at L3 with respect to L4 measuring approximately 4 mm.  At T12-L1 there is moderate degenerative disc disease with vacuum disc phenomenon.  Circumferential disc bulge with mild bilateral facet DJD that result in mild bilateral neural foraminal narrowing and mild spinal canal stenosis.  At L1-L2, there is no significant neural foraminal narrowing or spinal canal stenosis.  At L2-L3, no significant neural foraminal narrowing or spinal canal stenosis.  At L3-L4, there is moderate degenerative disc disease with vacuum disc phenomenon and central disc osteophyte complex.  Indents facet DJD contributes to moderate bilateral neural foraminal narrowing.  At L4-5, there is severe degenerative disc disease with vacuum disc phenomenon.  Advanced facet DJD contributes to moderate left and severe right neural foraminal narrowing is.  At L5-S1, there is mild bilateral neural foraminal narrowing secondary to facet DJD.  Extensive postoperative stranding throughout the posterior.  Spinal tissues.  Ascites is demonstrated in the abdomen.     Impression Multilevel postoperative and degenerative changes as discussed in detail  above.  All CT scans at this facility are performed  using dose modulation techniques as appropriate to performed exam including the following:  automated exposure control; adjustment of mA and/or kV according to the patients size (this includes techniques or standardized protocols for targeted exams where dose is matched to indication/reason for exam: i.e. extremities or head);  iterative reconstruction technique.           Results for orders placed in visit on 03/03/11   MRI Lumbar Spine Without Contrast     Narrative RESULTS: THIS STUDY DEMONSTRATES SEVERE DEGENERATIVE END PLATE CHANGES AT L4-5 AND L2-3.  THERE IS A LARGE ANTERIOR OSTEOPHYTE PROJECTING OFF THE END PLATES OF L3, L2, AND L4.  THERE ARE ALSO PROMINENT DISC BULGES AT L4-5, L3-4, AND L2-3.  THIS STUDY DEMONSTRATES MULTILEVEL FACET ARTHROPATHY.    IMPRESSION: PROMINENT DEGENERATIVE DISC DISEASE AS DESCRIBED IN THE ABOVE PARAGRAPH. THIS STUDY ALSO DEMONSTRATES PROMINENT RIGHT NEURAL FORAMINAL NARROWING AT L4-5 AND LEFT NEURAL FORAMINAL NARROWING AT L4-5.            Results for orders placed during the hospital encounter of 06/08/11   MRI Lumbar Spine W WO Cont     Narrative RESULTS:  Indication:     Back pain patient's had extensive operative intervention   with laminectomy starting at L3 and extending to mid L4. There also   appears to be a right laminectomy at L4-L5. Multiple pedicle screws   apparently have been removed.  L1-L2 level unremarkable.  L2-L3 disc is narrowed there is mild spondylosis with minor facet   arthropathy present. There is some posterior paravertebral edema present   right greater than left extending from right facet lesion of L2-L3. There   is mild central stenosis present but the L2 nerve roots exit without   contact or distortion. There screw artifacts present in the pedicles at   L2 and L3-L4 and L5.  L3-L4 disc is narrowed there is mild spondylosis patient's had   laminectomy there is extensive para spinous muscular edema  with fluid   along the left lamina and paraspinous region is also considerable loss of   fat plane within the paravertebral musculature of the spine from L1-L2   down to sacral region.  L4-L5 level is obliterated right laminectomy is present is extensive   edema and loss of fat planes within the paravertebral musculature and   around the posterior and lateral thecal sac.  The L5-S1 disc is intact there is facet arthropathy present.  Postcontrast study demonstrates diffuse enhancement of the  paravertebral   soft tissues starting at approximately the T12 and extending to the   sacral level.  There is diffuse enhancement of the L2-L3 disc with enhancing tissues of   the posterior paraspinous region and minor facet joint enhancement. There   is also enhancement of the posterior epidural region resulting in some   distortion of the dorsal lateral thecal sac, left greater than right.  The L3-L4 level demonstrates minimal central and left-sided enhancement   of the disc with extensive  enhancement of the paraspinous elements with   enhancement of the posterior and lateral epidural canal. No intradural   enhancement is noted. There is some enhancement around the right residual   facet . A portion of the left facet complex is absent..  The L4-L5 level again demonstrates comparable diffuse paraspinous   enhancement and enhancement surrounding the thecal sac within the central   canal. There is enhancement of the bony elements including the pedicles   and facet complex.  L5-S1 demonstrate some  facet enhancement and posterior perithecal   enhancement  IMPRESSION:       Patient's had extensive operative intervention with removal of   pedicle screws at L2-L3 L4 and L5. There is extensive enhancement of the   operative bed in the posterior epidural region as well is some   enhancement of the scar tissue around the thecal sac. There Is no   intradural or intramedullary enhancement although there is clumping of   the cauda and  enhancement of the L2-L3 and L4-L5 disc. There is also   small amount enhancement of the posterior aspect of the L3-L4 disc.   enhancement consistent with gliosis. The various levels of bone   enhancement including the facet complex at L3 and L4 as well as portions   of the pedicles involving L5 involving and adjacent to the screw tracks..           Results for orders placed during the hospital encounter of 07/11/18   CT Lumbar Spine Without Contrast     Narrative COMPARISON:  Lumbar spine MRI performed on 06/08/2011 and lumbar spine radiographs on 08/08/2011  FINDINGS:  Since the prior MRI examination, development complete osseous fusion the L2 and L3 vertebral bodies.  Laminectomy/laminotomy deformities from L2 through L5.Minimal retrolisthesis at L3 with respect to L4 measuring approximately 4 mm.  At T12-L1 there is moderate degenerative disc disease with vacuum disc phenomenon.  Circumferential disc bulge with mild bilateral facet DJD that result in mild bilateral neural foraminal narrowing and mild spinal canal stenosis.  At L1-L2, there is no significant neural foraminal narrowing or spinal canal stenosis.  At L2-L3, no significant neural foraminal narrowing or spinal canal stenosis.  At L3-L4, there is moderate degenerative disc disease with vacuum disc phenomenon and central disc osteophyte complex.  Indents facet DJD contributes to moderate bilateral neural foraminal narrowing.  At L4-5, there is severe degenerative disc disease with vacuum disc phenomenon.  Advanced facet DJD contributes to moderate left and severe right neural foraminal narrowing is.  At L5-S1, there is mild bilateral neural foraminal narrowing secondary to facet DJD.  Extensive postoperative stranding throughout the posterior.  Spinal tissues.  Ascites is demonstrated in the abdomen.     Impression Multilevel postoperative and degenerative changes as discussed in detail above.  All CT scans at this facility are performed  using dose  modulation techniques as appropriate to performed exam including the following:  automated exposure control; adjustment of mA and/or kV according to the patients size (this includes techniques or standardized protocols for targeted exams where dose is matched to indication/reason for exam: i.e. extremities or head);  iterative reconstruction technique.           Results for orders placed during the hospital encounter of 09/25/19   X-Ray Lumbar Spine Complete 5 View     Narrative COMPARISON:  05/30/2018  FINDINGS:  Scoliosis remains.  Vertebral body heights and alignment are stable.  Multilevel advanced degenerative disc height loss and osteophyte formation noted.  Multilevel facet arthropathy present more prevalent at the lower lumbar spine.  No acute osseous abnormality appreciated.  Aorta iliac atherosclerotic vascular calcifications noted.     Impression Similar appearance of the spine.  Correlate with MRI exam as clinically indicated.           Results for orders placed during the hospital encounter of 05/30/18   X-Ray Lumbar Spine AP And Lateral     Narrative COMPARISON:  08/08/2011  FINDINGS:  Mild-to-moderate dextroscoliosis of the lumbar spine noted.  There is suggestion of possible mild loss of vertebral body height at L5 which may potentially be projectional in nature and was not definitely seen on prior.  Age-indeterminate compression fracture not excluded.  Further characterization could be obtained with MRI as clinically warranted.  Moderate disc height loss from L2-3 through L3-4 appears unchanged.  Multilevel facet arthropathy suspected throughout the lumbar spine.  Posterior elements appear grossly intact. No acute fractures or subluxations are demonstrated.  Visualized osseous structures appear diffusely osteopenic.     Impression As above.           Results for orders placed during the hospital encounter of 08/26/14   X-Ray Cervical Spine AP And Lateral     Narrative Findings: Vertebral alignment  is normal.  There is narrowing of the disk spaces and  anterior osteophyte formation C 3-7.  There are no compression fractures or acute abnormalities are seen.  Carotid calcifications are noted bilaterally.     Impression  Advanced degenerative change in the cervical spine.         PMHx,PSHx, Social history, and Family history:  I have reviewed the patient's medical, surgical, social, and family history in detail and updated the computerized patient record.    Review of patient's allergies indicates:  No Known Allergies    Current Outpatient Medications   Medication Sig    acetaminophen (TYLENOL) 500 MG tablet Take 500 mg by mouth every 6 (six) hours as needed for Pain.    aspirin (ECOTRIN) 81 MG EC tablet Take 81 mg by mouth.    atorvastatin (LIPITOR) 80 MG tablet TK 1 T PO D    blood sugar diagnostic Strp Check blood glucose levels daily in the AM fasting and 1-2 times more daily.    cholecalciferol, vitamin D3, (VITAMIN D3) 1,000 unit capsule Take 5,000 Units by mouth once daily.    clopidogrel (PLAVIX) 75 mg tablet Take 75 mg by mouth once daily.    ENTRESTO 24-26 mg per tablet     fish oil-omega-3 fatty acids 300-1,000 mg capsule Take 1 capsule by mouth once daily.    fluticasone propionate (FLONASE) 50 mcg/actuation nasal spray 2 sprays (100 mcg total) by Each Nostril route once daily.    furosemide (LASIX) 40 MG tablet Take 1 tablet (40 mg total) by mouth once daily. Take extra dose as needed for swelling or weight gain 3 lbs    gabapentin (NEURONTIN) 300 MG capsule Take 2 capsules (600 mg total) by mouth every evening. Can take 3 caps qhs or tid if tolerated, may cause drowsiness    lancets Misc Check blood glucose levels daily in the AM fasting and 1-2 times more daily.    levocetirizine (XYZAL) 5 MG tablet Take 1 tablet (5 mg total) by mouth every evening.    multivitamin capsule Take 1 capsule by mouth once daily.    nitroGLYCERIN (NITROSTAT) 0.3 MG SL tablet PLACE 1 TABLET UNDER THE  "TONGUE EVERY 5 MINUTES AS NEEDED FOR CHEST PAIN AS DIRECTED    spironolactone (ALDACTONE) 50 MG tablet TK 1 T PO D    traMADoL (ULTRAM) 50 mg tablet Take 1 tablet (50 mg total) by mouth every 12 (twelve) hours as needed for Pain. Greater than 7 day supply medically necessary.    blood-glucose meter kit Use as instructed     No current facility-administered medications for this visit.      Facility-Administered Medications Ordered in Other Visits   Medication    ondansetron injection 4 mg    ondansetron injection 4 mg         REVIEW OF SYSTEMS:    GENERAL:  No weight loss, malaise or fevers.  HEENT:   No recent changes in vision or hearing  NECK:  Negative for lumps, no difficulty with swallowing.  RESPIRATORY:  Negative for cough, wheezing or shortness of breath, patient denies any recent URI.  CARDIOVASCULAR:  Negative for chest pain, leg swelling or palpitations.  GI:  Negative for abdominal discomfort, blood in stools or black stools or change in bowel habits.  MUSCULOSKELETAL:  See HPI.  SKIN:  Negative for lesions, rash, and itching.  PSYCH:  No mood disorder or recent psychosocial stressors.  Patients sleep is not disturbed secondary to pain.  HEMATOLOGY/LYMPHOLOGY:  Negative for prolonged bleeding, bruising easily or swollen nodes.  Patient is currently taking anti-coagulants -aspirin and Plavix  NEURO:   No history of headaches, syncope, paralysis, seizures or tremors.  All other reviewed and negative other than HPI.    OBJECTIVE:    /76   Pulse 68   Ht 5' 9" (1.753 m)   Wt 97.1 kg (214 lb 2.8 oz)   BMI 31.63 kg/m²     PHYSICAL EXAMINATION:    GENERAL: Well appearing, in no acute distress, alert and oriented x3.  PSYCH:  Mood and affect appropriate.  SKIN:  Mottling over the lumbar spine from excessive use of heating pad.  There are no other skin color changes, texture, turgor normal, no rashes or lesions.  HEAD/FACE:  Normocephalic, atraumatic. Cranial nerves grossly intact.  CV: RRR with " palpation of the radial artery.  PULM: No evidence of respiratory difficulty, symmetric chest rise.  GI:  Soft and non-tender.  BACK:  Surgical scarring over the lumbar spine.  Straight leg raising in the sitting and supine positions is negative to radicular pain. No pain to palpation over the facet joints of the lumbar spine or spinous processes. Normal range of motion without pain reproduction.  EXTREMITIES: Peripheral joint ROM is full and pain free without obvious instability or laxity in all four extremities. No deformities, edema, or skin discoloration. Good capillary refill.  MUSCULOSKELETAL: Shoulder, hip, and knee provocative maneuvers are negative.  There is no pain with palpation over the sacroiliac joints bilaterally.  FABERs test is negative.  FADIRs test is negative.   Bilateral upper and lower extremity strength is normal and symmetric.  No atrophy or tone abnormalities are noted.  NEURO: Bilateral upper and lower extremity coordination and muscle stretch reflexes are physiologic and symmetric.  Plantar response are downgoing. No clonus.  No loss of sensation is noted.  GAIT: normal.      LABS:  Lab Results   Component Value Date    WBC 7.67 03/20/2019    HGB 13.6 (L) 03/20/2019    HCT 41.5 03/20/2019    MCV 98 03/20/2019     03/20/2019       CMP  Sodium   Date Value Ref Range Status   04/27/2020 140 136 - 145 mmol/L Final     Potassium   Date Value Ref Range Status   04/27/2020 4.2 3.5 - 5.1 mmol/L Final     Chloride   Date Value Ref Range Status   04/27/2020 101 95 - 110 mmol/L Final     CO2   Date Value Ref Range Status   04/27/2020 28 23 - 29 mmol/L Final     Glucose   Date Value Ref Range Status   04/27/2020 131 (H) 70 - 110 mg/dL Final     BUN, Bld   Date Value Ref Range Status   04/27/2020 29 (H) 8 - 23 mg/dL Final     Creatinine   Date Value Ref Range Status   04/27/2020 1.7 (H) 0.5 - 1.4 mg/dL Final     Calcium   Date Value Ref Range Status   04/27/2020 9.6 8.7 - 10.5 mg/dL Final      Total Protein   Date Value Ref Range Status   04/27/2020 7.7 6.0 - 8.4 g/dL Final     Albumin   Date Value Ref Range Status   04/27/2020 4.0 3.5 - 5.2 g/dL Final     Total Bilirubin   Date Value Ref Range Status   04/27/2020 0.5 0.1 - 1.0 mg/dL Final     Comment:     For infants and newborns, interpretation of results should be based  on gestational age, weight and in agreement with clinical  observations.  Premature Infant recommended reference ranges:  Up to 24 hours.............<8.0 mg/dL  Up to 48 hours............<12.0 mg/dL  3-5 days..................<15.0 mg/dL  6-29 days.................<15.0 mg/dL       Alkaline Phosphatase   Date Value Ref Range Status   04/27/2020 68 55 - 135 U/L Final     AST   Date Value Ref Range Status   04/27/2020 19 10 - 40 U/L Final     ALT   Date Value Ref Range Status   04/27/2020 14 10 - 44 U/L Final     Anion Gap   Date Value Ref Range Status   04/27/2020 11 8 - 16 mmol/L Final     eGFR if    Date Value Ref Range Status   04/27/2020 44.0 (A) >60 mL/min/1.73 m^2 Final     eGFR if non    Date Value Ref Range Status   04/27/2020 38.1 (A) >60 mL/min/1.73 m^2 Final     Comment:     Calculation used to obtain the estimated glomerular filtration  rate (eGFR) is the CKD-EPI equation.          Lab Results   Component Value Date    HGBA1C 6.0 (H) 03/20/2020             ASSESSMENT: 80 y.o. year old male with  Lower back pain, consistent with     1. Lumbar spondylosis  Ambulatory referral/consult to Physical/Occupational Therapy   2. DDD (degenerative disc disease), lumbar  Ambulatory referral/consult to Physical/Occupational Therapy   3. Lumbar radiculopathy  Ambulatory referral/consult to Physical/Occupational Therapy         PLAN:   - Interventions: None at this time as previous interventional procedures did not provide significant or long-lasting relief.  Consider trigger point injections to the right gluteus medius or quadratus lumborum in the  future if warranted.  - Anticoagulation: yes, aspirin and Plavix  - Medications: I have stressed the importance of physical activity and a home exercise plan to help with pain and improve health.  Tramadol was not effective in alleviating his pain.  NSAIDs are contraindicated due to his medical comorbidities. An opioid pain contract was completed on 06/24/2020 after having an extensive conversation about chronic opioid use for pain management.  He is no longer in should in long-term opioid use.  - Therapy:  External referral placed for physical therapy to evaluate lower back and right hip pain.  - Labs:  Reviewed  - Imaging:  Reviewed imaging available  - Consults/Referrals:  None at this time  - Records:  Reviewed/Obtain old records from outside physicians and imaging  - Follow up visit: return to clinic in 10 weeks or as needed  - Counseled patient regarding the importance of activity modification and physical therapy  - This condition does not require this patient to take time off of work, and the primary goal of our Pain Management services is to improve the patient's functional capacity.  - Patient Questions: Answered all of the patient's questions regarding diagnosis, therapy, and treatment    The above plan and management options were discussed at length with patient. Patient is in agreement with the above and verbalized understanding.      Tacho Trivedi MD  Interventional Pain Management  [facility]    Disclaimer:  This note was prepared using voice recognition system and is likely to have sound alike errors that may have been overlooked even after proof reading.  Please call me with any questions

## 2020-07-23 LAB
BATTERY VOLTAGE (V): 2.94 V
IMPEDANCE RA LEAD (DONOR): 893 OHMS
IMPEDANCE RA LEAD (NATIVE): 532 OHMS
IMPEDANCE RA LEAD: 456 OHMS
OHS CV DC PP MS2: 0.4 MS
OHS CV DC PP MS3: 1.5 MS
OHS CV DC PP V2: 2 V
OHS CV DC PP V3: 4.5 V
P/R-WAVE RA LEAD (NATIVE): 3.6 MV
P/R-WAVE RA LEAD: 0.4 MV
THRESHOLD MS RA LEAD (NATIVE): 0.4 MS
THRESHOLD MS RA LEAD: 0.4 MS
THRESHOLD V RA LEAD (NATIVE): 3.25 V
THRESHOLD V RA LEAD: 0.75 V
VV DELAY: 20 MSEC

## 2020-08-04 DIAGNOSIS — M47.816 LUMBAR SPONDYLOSIS: Primary | ICD-10-CM

## 2020-08-04 DIAGNOSIS — M54.16 LUMBAR RADICULOPATHY: ICD-10-CM

## 2020-08-04 DIAGNOSIS — M51.36 DDD (DEGENERATIVE DISC DISEASE), LUMBAR: ICD-10-CM

## 2020-08-12 DIAGNOSIS — J30.9 ALLERGIC RHINITIS, UNSPECIFIED SEASONALITY, UNSPECIFIED TRIGGER: ICD-10-CM

## 2020-08-13 ENCOUNTER — TELEPHONE (OUTPATIENT)
Dept: PAIN MEDICINE | Facility: CLINIC | Age: 80
End: 2020-08-13

## 2020-08-13 RX ORDER — GABAPENTIN 300 MG/1
600 CAPSULE ORAL NIGHTLY
Qty: 180 CAPSULE | Refills: 0 | Status: SHIPPED | OUTPATIENT
Start: 2020-08-13 | End: 2021-05-14 | Stop reason: SDUPTHER

## 2020-08-13 NOTE — TELEPHONE ENCOUNTER
----- Message from Mami Mckay MA sent at 8/13/2020 12:52 PM CDT -----  Contact: [employer] [facility]- [phone]  I think this message was supposed to be sent your office. Please confirm.   ----- Message -----  From: Radha Garcia  Sent: 8/13/2020   8:12 AM CDT  To: [facility] Staff    Would  like to consult with the nurse, calling concerning the patient Rx that was sent over for his gabapentin,  please fax to   [phone], would like to speak with the nurse concerning this, please call back thanks sj

## 2020-08-15 ENCOUNTER — OFFICE VISIT (OUTPATIENT)
Dept: FAMILY MEDICINE | Facility: CLINIC | Age: 80
End: 2020-08-15
Payer: MEDICARE

## 2020-08-15 DIAGNOSIS — E78.2 MIXED HYPERLIPIDEMIA: ICD-10-CM

## 2020-08-15 DIAGNOSIS — I70.0 ARTERIOSCLEROSIS OF AORTA: ICD-10-CM

## 2020-08-15 DIAGNOSIS — J43.9 MIXED RESTRICTIVE AND OBSTRUCTIVE LUNG DISEASE: ICD-10-CM

## 2020-08-15 DIAGNOSIS — E11.69 DIABETES MELLITUS TYPE 2 IN OBESE: ICD-10-CM

## 2020-08-15 DIAGNOSIS — G47.61 PERIODIC LIMB MOVEMENT DISORDER (PLMD): Primary | ICD-10-CM

## 2020-08-15 DIAGNOSIS — I48.0 PAROXYSMAL ATRIAL FIBRILLATION: ICD-10-CM

## 2020-08-15 DIAGNOSIS — J98.4 MIXED RESTRICTIVE AND OBSTRUCTIVE LUNG DISEASE: ICD-10-CM

## 2020-08-15 DIAGNOSIS — D71 GRANULOMATOUS DISEASE: ICD-10-CM

## 2020-08-15 DIAGNOSIS — J30.9 ALLERGIC RHINITIS, UNSPECIFIED SEASONALITY, UNSPECIFIED TRIGGER: ICD-10-CM

## 2020-08-15 DIAGNOSIS — I25.10 CORONARY ARTERY DISEASE INVOLVING NATIVE CORONARY ARTERY OF NATIVE HEART WITHOUT ANGINA PECTORIS: ICD-10-CM

## 2020-08-15 DIAGNOSIS — I10 ESSENTIAL HYPERTENSION: ICD-10-CM

## 2020-08-15 DIAGNOSIS — E66.9 OBESITY (BMI 30-39.9): ICD-10-CM

## 2020-08-15 DIAGNOSIS — E66.9 DIABETES MELLITUS TYPE 2 IN OBESE: ICD-10-CM

## 2020-08-15 DIAGNOSIS — Z95.1 S/P CABG X 3: ICD-10-CM

## 2020-08-15 PROCEDURE — 99213 OFFICE O/P EST LOW 20 MIN: CPT | Mod: HCNC,95,, | Performed by: FAMILY MEDICINE

## 2020-08-15 PROCEDURE — 99499 RISK ADDL DX/OHS AUDIT: ICD-10-PCS | Mod: HCNC,95,, | Performed by: FAMILY MEDICINE

## 2020-08-15 PROCEDURE — 99499 UNLISTED E&M SERVICE: CPT | Mod: HCNC,95,, | Performed by: FAMILY MEDICINE

## 2020-08-15 PROCEDURE — 1101F PR PT FALLS ASSESS DOC 0-1 FALLS W/OUT INJ PAST YR: ICD-10-PCS | Mod: HCNC,CPTII,95, | Performed by: FAMILY MEDICINE

## 2020-08-15 PROCEDURE — 1101F PT FALLS ASSESS-DOCD LE1/YR: CPT | Mod: HCNC,CPTII,95, | Performed by: FAMILY MEDICINE

## 2020-08-15 PROCEDURE — 1159F MED LIST DOCD IN RCRD: CPT | Mod: HCNC,95,, | Performed by: FAMILY MEDICINE

## 2020-08-15 PROCEDURE — 1159F PR MEDICATION LIST DOCUMENTED IN MEDICAL RECORD: ICD-10-PCS | Mod: HCNC,95,, | Performed by: FAMILY MEDICINE

## 2020-08-15 PROCEDURE — 99213 PR OFFICE/OUTPT VISIT, EST, LEVL III, 20-29 MIN: ICD-10-PCS | Mod: HCNC,95,, | Performed by: FAMILY MEDICINE

## 2020-08-15 RX ORDER — CLONAZEPAM 1 MG/1
1 TABLET ORAL NIGHTLY
Qty: 30 TABLET | Refills: 0 | Status: SHIPPED | OUTPATIENT
Start: 2020-08-15 | End: 2020-09-09 | Stop reason: SDUPTHER

## 2020-08-15 NOTE — PROGRESS NOTES
"Subjective:       Patient ID: Jake Ortiz is a 80 y.o. male.    Chief Complaint: Follow-up    HPI   Follow-up  79yo male presents today via virtual visit for follow-up. He has been taking Klonopin nightly for sleep. This treatment plan was initiated by his Cardiologist "years ago". He has been out of the RX for the past few nights and has not been sleeping as a result. He did have one active refill remaining and received an update that the RX was shipped but then received an email indicating the order was canceled. He has not been able to contact anyone with [facility] for a renewal. He is questioning whether he needs to continue with Xyzal. He is on Flonase routinely. No symptoms of concern for allergy flare. He will start at [facility] in the coming week.     Review of Systems   Constitutional: Positive for activity change. Negative for unexpected weight change.   HENT: Negative for hearing loss, rhinorrhea and trouble swallowing.    Eyes: Negative for discharge and visual disturbance.   Respiratory: Negative for chest tightness and wheezing.    Cardiovascular: Negative for chest pain and palpitations.   Gastrointestinal: Negative for blood in stool, constipation, diarrhea and vomiting.   Endocrine: Negative for polydipsia and polyuria.   Genitourinary: Negative for difficulty urinating, hematuria and urgency.   Musculoskeletal: Negative for arthralgias, joint swelling and neck pain.   Neurological: Negative for weakness and headaches.   Psychiatric/Behavioral: Positive for sleep disturbance. Negative for confusion and dysphoric mood.       Objective:   There were no vitals taken for this visit.  Physical Exam  Constitutional:       Appearance: He is obese. He is not toxic-appearing.   HENT:      Head: Normocephalic and atraumatic.      Mouth/Throat:      Mouth: Mucous membranes are moist.   Eyes:      Extraocular Movements: Extraocular movements intact.      Conjunctiva/sclera: Conjunctivae normal.   Pulmonary:    "   Effort: Pulmonary effort is normal. No respiratory distress.   Neurological:      Mental Status: He is alert and oriented to person, place, and time.   Psychiatric:         Mood and Affect: Mood normal.         Behavior: Behavior normal.         Assessment:       1. Periodic limb movement disorder (PLMD)    2. Allergic rhinitis, unspecified seasonality, unspecified trigger    3. Diabetes mellitus type 2 in obese    4. Essential hypertension    5. Mixed hyperlipidemia    6. Paroxysmal atrial fibrillation    7. Coronary artery disease involving native coronary artery of native heart without angina pectoris    8. Mixed restrictive and obstructive lung disease    9. Granulomatous disease    10. Arteriosclerosis of aorta    11. Obesity (BMI 30-39.9)    12. S/P CABG x 3        Plan:      Periodic limb movement disorder (PLMD)  Patient with one refill on file with [facility] but reports an issue with his RX. Explained I am unable to provide a 90 day RX at this time. He will reach out to [facility] and report back. In the interim I have sent a temporary RX to his local pharmacy.  was accessed prior to RX provision with last fill date of 6/15/2020.   -     clonazePAM (KLONOPIN) 1 MG tablet; Take 1 tablet (1 mg total) by mouth every evening.  Dispense: 30 tablet; Refill: 0    Allergic rhinitis, unspecified seasonality, unspecified trigger  Continue with Xyzal and Flonase in combination.    Diabetes mellitus type 2 in obese  Continue with current measures.    Essential hypertension  Continue with current measures.    Mixed hyperlipidemia  Continue with current measures.    Paroxysmal atrial fibrillation  As per Cardiology.    Coronary artery disease involving native coronary artery of native heart without angina pectoris  As above.    Mixed restrictive and obstructive lung disease  As per Pulmonology.    Granulomatous disease  As above.    Arteriosclerosis of aorta  BP and lipid control remain advised.    Obesity (BMI  30-39.9)  Weight loss efforts remain encouraged through diet and lifestyle measures.    S/P CABG x 3      The patient location is: [place]  The chief complaint leading to consultation is: follow-up    Visit type: audiovisual    Face to Face time with patient: 12 minutes  16 minutes of total time spent on the encounter, which includes face to face time and non-face to face time preparing to see the patient (eg, review of tests), Obtaining and/or reviewing separately obtained history, Documenting clinical information in the electronic or other health record, Independently interpreting results (not separately reported) and communicating results to the patient/family/caregiver, or Care coordination (not separately reported).     Each patient to whom he or she provides medical services by telemedicine is:  (1) informed of the relationship between the physician and patient and the respective role of any other health care provider with respect to management of the patient; and (2) notified that he or she may decline to receive medical services by telemedicine and may withdraw from such care at any time.

## 2020-09-08 DIAGNOSIS — G47.61 PERIODIC LIMB MOVEMENT DISORDER (PLMD): ICD-10-CM

## 2020-09-09 RX ORDER — CLONAZEPAM 1 MG/1
1 TABLET ORAL NIGHTLY
Qty: 30 TABLET | Refills: 0 | Status: SHIPPED | OUTPATIENT
Start: 2020-09-09 | End: 2020-10-21 | Stop reason: SDUPTHER

## 2020-09-14 ENCOUNTER — PATIENT MESSAGE (OUTPATIENT)
Dept: CARDIOLOGY | Facility: CLINIC | Age: 80
End: 2020-09-14

## 2020-09-14 DIAGNOSIS — I10 ESSENTIAL HYPERTENSION: Primary | ICD-10-CM

## 2020-09-14 RX ORDER — SPIRONOLACTONE 25 MG/1
25 TABLET ORAL DAILY
Qty: 3 TABLET | Refills: 3 | Status: SHIPPED | OUTPATIENT
Start: 2020-09-14 | End: 2020-09-15 | Stop reason: SDUPTHER

## 2020-09-15 RX ORDER — SPIRONOLACTONE 25 MG/1
25 TABLET ORAL DAILY
Qty: 30 TABLET | Refills: 6 | Status: SHIPPED | OUTPATIENT
Start: 2020-09-15 | End: 2020-09-18

## 2020-09-16 ENCOUNTER — PATIENT MESSAGE (OUTPATIENT)
Dept: CARDIOLOGY | Facility: CLINIC | Age: 80
End: 2020-09-16

## 2020-09-17 ENCOUNTER — PATIENT MESSAGE (OUTPATIENT)
Dept: FAMILY MEDICINE | Facility: CLINIC | Age: 80
End: 2020-09-17

## 2020-09-17 NOTE — TELEPHONE ENCOUNTER
Patient was sent a my chart message regarding his concerns. His labs next week are for diabetes. He may need a visit sooner as he has additional concerns- offer a visit today. I also recommended he reach out to his Cardiologist.

## 2020-09-18 ENCOUNTER — HOSPITAL ENCOUNTER (INPATIENT)
Facility: HOSPITAL | Age: 80
LOS: 4 days | Discharge: HOME | DRG: 378 | End: 2020-09-22
Attending: EMERGENCY MEDICINE | Admitting: INTERNAL MEDICINE
Payer: MEDICARE

## 2020-09-18 ENCOUNTER — PATIENT MESSAGE (OUTPATIENT)
Dept: CARDIOLOGY | Facility: CLINIC | Age: 80
End: 2020-09-18

## 2020-09-18 DIAGNOSIS — R53.83 FATIGUE, UNSPECIFIED TYPE: ICD-10-CM

## 2020-09-18 DIAGNOSIS — K92.2 LOWER GI BLEED: ICD-10-CM

## 2020-09-18 DIAGNOSIS — E86.0 DEHYDRATION: Primary | ICD-10-CM

## 2020-09-18 DIAGNOSIS — D62 ACUTE BLOOD LOSS ANEMIA: ICD-10-CM

## 2020-09-18 DIAGNOSIS — K62.5 RECTAL BLEEDING: Primary | ICD-10-CM

## 2020-09-18 PROBLEM — N17.9 AKI (ACUTE KIDNEY INJURY): Status: ACTIVE | Noted: 2020-09-18

## 2020-09-18 LAB
ABO + RH BLD: NORMAL
ALBUMIN SERPL BCP-MCNC: 3.7 G/DL (ref 3.5–5.2)
ALP SERPL-CCNC: 47 U/L (ref 55–135)
ALT SERPL W/O P-5'-P-CCNC: 7 U/L (ref 10–44)
ANION GAP SERPL CALC-SCNC: 9 MMOL/L (ref 8–16)
ANISOCYTOSIS BLD QL SMEAR: SLIGHT
APTT BLDCRRT: 28.1 SEC (ref 21–32)
AST SERPL-CCNC: 11 U/L (ref 10–40)
BASOPHILS # BLD AUTO: 0.02 K/UL (ref 0–0.2)
BASOPHILS # BLD AUTO: 0.02 K/UL (ref 0–0.2)
BASOPHILS NFR BLD: 0.4 % (ref 0–1.9)
BASOPHILS NFR BLD: 0.4 % (ref 0–1.9)
BILIRUB SERPL-MCNC: 0.2 MG/DL (ref 0.1–1)
BILIRUB UR QL STRIP: NEGATIVE
BLD GP AB SCN CELLS X3 SERPL QL: NORMAL
BLD PROD TYP BPU: NORMAL
BLD PROD TYP BPU: NORMAL
BLOOD UNIT EXPIRATION DATE: NORMAL
BLOOD UNIT EXPIRATION DATE: NORMAL
BLOOD UNIT TYPE CODE: 5100
BLOOD UNIT TYPE CODE: 5100
BLOOD UNIT TYPE: NORMAL
BLOOD UNIT TYPE: NORMAL
BUN SERPL-MCNC: 40 MG/DL (ref 8–23)
CALCIUM SERPL-MCNC: 8.7 MG/DL (ref 8.7–10.5)
CHLORIDE SERPL-SCNC: 108 MMOL/L (ref 95–110)
CLARITY UR: CLEAR
CO2 SERPL-SCNC: 21 MMOL/L (ref 23–29)
CODING SYSTEM: NORMAL
CODING SYSTEM: NORMAL
COLOR UR: YELLOW
CREAT SERPL-MCNC: 1.9 MG/DL (ref 0.5–1.4)
DIFFERENTIAL METHOD: ABNORMAL
DIFFERENTIAL METHOD: ABNORMAL
DISPENSE STATUS: NORMAL
DISPENSE STATUS: NORMAL
EOSINOPHIL # BLD AUTO: 0.1 K/UL (ref 0–0.5)
EOSINOPHIL # BLD AUTO: 0.1 K/UL (ref 0–0.5)
EOSINOPHIL NFR BLD: 0.9 % (ref 0–8)
EOSINOPHIL NFR BLD: 1.4 % (ref 0–8)
ERYTHROCYTE [DISTWIDTH] IN BLOOD BY AUTOMATED COUNT: 14.3 % (ref 11.5–14.5)
ERYTHROCYTE [DISTWIDTH] IN BLOOD BY AUTOMATED COUNT: 14.6 % (ref 11.5–14.5)
EST. GFR  (AFRICAN AMERICAN): 38 ML/MIN/1.73 M^2
EST. GFR  (NON AFRICAN AMERICAN): 33 ML/MIN/1.73 M^2
GLUCOSE SERPL-MCNC: 131 MG/DL (ref 70–110)
GLUCOSE UR QL STRIP: NEGATIVE
HCT VFR BLD AUTO: 16.9 % (ref 40–54)
HCT VFR BLD AUTO: 20.6 % (ref 40–54)
HGB BLD-MCNC: 5.1 G/DL (ref 14–18)
HGB BLD-MCNC: 6.3 G/DL (ref 14–18)
HGB UR QL STRIP: NEGATIVE
HYPOCHROMIA BLD QL SMEAR: ABNORMAL
IMM GRANULOCYTES # BLD AUTO: 0.01 K/UL (ref 0–0.04)
IMM GRANULOCYTES # BLD AUTO: 0.03 K/UL (ref 0–0.04)
IMM GRANULOCYTES NFR BLD AUTO: 0.2 % (ref 0–0.5)
IMM GRANULOCYTES NFR BLD AUTO: 0.6 % (ref 0–0.5)
INR PPP: 1.2 (ref 0.8–1.2)
KETONES UR QL STRIP: NEGATIVE
LEUKOCYTE ESTERASE UR QL STRIP: NEGATIVE
LYMPHOCYTES # BLD AUTO: 0.8 K/UL (ref 1–4.8)
LYMPHOCYTES # BLD AUTO: 1.1 K/UL (ref 1–4.8)
LYMPHOCYTES NFR BLD: 16.1 % (ref 18–48)
LYMPHOCYTES NFR BLD: 20.1 % (ref 18–48)
MCH RBC QN AUTO: 29.3 PG (ref 27–31)
MCH RBC QN AUTO: 29.7 PG (ref 27–31)
MCHC RBC AUTO-ENTMCNC: 30.2 G/DL (ref 32–36)
MCHC RBC AUTO-ENTMCNC: 30.6 G/DL (ref 32–36)
MCV RBC AUTO: 96 FL (ref 82–98)
MCV RBC AUTO: 98 FL (ref 82–98)
MONOCYTES # BLD AUTO: 0.5 K/UL (ref 0.3–1)
MONOCYTES # BLD AUTO: 0.5 K/UL (ref 0.3–1)
MONOCYTES NFR BLD: 10.2 % (ref 4–15)
MONOCYTES NFR BLD: 8.6 % (ref 4–15)
NEUTROPHILS # BLD AUTO: 3.5 K/UL (ref 1.8–7.7)
NEUTROPHILS # BLD AUTO: 3.7 K/UL (ref 1.8–7.7)
NEUTROPHILS NFR BLD: 69.8 % (ref 38–73)
NEUTROPHILS NFR BLD: 71.3 % (ref 38–73)
NITRITE UR QL STRIP: NEGATIVE
NRBC BLD-RTO: 0 /100 WBC
NRBC BLD-RTO: 0 /100 WBC
NUM UNITS TRANS PACKED RBC: NORMAL
NUM UNITS TRANS PACKED RBC: NORMAL
OVALOCYTES BLD QL SMEAR: ABNORMAL
PH UR STRIP: 7 [PH] (ref 5–8)
PLATELET # BLD AUTO: 296 K/UL (ref 150–350)
PLATELET # BLD AUTO: 296 K/UL (ref 150–350)
PLATELET BLD QL SMEAR: ABNORMAL
PMV BLD AUTO: 9.3 FL (ref 9.2–12.9)
PMV BLD AUTO: 9.5 FL (ref 9.2–12.9)
POCT GLUCOSE: 111 MG/DL (ref 70–110)
POCT GLUCOSE: 196 MG/DL (ref 70–110)
POIKILOCYTOSIS BLD QL SMEAR: SLIGHT
POTASSIUM SERPL-SCNC: 4.7 MMOL/L (ref 3.5–5.1)
PROT SERPL-MCNC: 6.7 G/DL (ref 6–8.4)
PROT UR QL STRIP: NEGATIVE
PROTHROMBIN TIME: 12.7 SEC (ref 9–12.5)
RBC # BLD AUTO: 1.72 M/UL (ref 4.6–6.2)
RBC # BLD AUTO: 2.15 M/UL (ref 4.6–6.2)
SARS-COV-2 RDRP RESP QL NAA+PROBE: NEGATIVE
SODIUM SERPL-SCNC: 138 MMOL/L (ref 136–145)
SP GR UR STRIP: 1.01 (ref 1–1.03)
STOMATOCYTES BLD QL SMEAR: PRESENT
TARGETS BLD QL SMEAR: ABNORMAL
URN SPEC COLLECT METH UR: NORMAL
UROBILINOGEN UR STRIP-ACNC: NEGATIVE EU/DL
WBC # BLD AUTO: 4.9 K/UL (ref 3.9–12.7)
WBC # BLD AUTO: 5.33 K/UL (ref 3.9–12.7)

## 2020-09-18 PROCEDURE — G0378 HOSPITAL OBSERVATION PER HR: HCPCS | Mod: HCNC

## 2020-09-18 PROCEDURE — 86920 COMPATIBILITY TEST SPIN: CPT | Mod: HCNC

## 2020-09-18 PROCEDURE — C9113 INJ PANTOPRAZOLE SODIUM, VIA: HCPCS | Mod: HCNC | Performed by: NURSE PRACTITIONER

## 2020-09-18 PROCEDURE — 11000001 HC ACUTE MED/SURG PRIVATE ROOM: Mod: HCNC

## 2020-09-18 PROCEDURE — P9016 RBC LEUKOCYTES REDUCED: HCPCS | Mod: HCNC

## 2020-09-18 PROCEDURE — 85730 THROMBOPLASTIN TIME PARTIAL: CPT | Mod: HCNC

## 2020-09-18 PROCEDURE — 25000003 PHARM REV CODE 250: Mod: HCNC | Performed by: NURSE PRACTITIONER

## 2020-09-18 PROCEDURE — 99291 CRITICAL CARE FIRST HOUR: CPT | Mod: 25,HCNC

## 2020-09-18 PROCEDURE — 86901 BLOOD TYPING SEROLOGIC RH(D): CPT | Mod: HCNC

## 2020-09-18 PROCEDURE — 81003 URINALYSIS AUTO W/O SCOPE: CPT | Mod: HCNC

## 2020-09-18 PROCEDURE — 96376 TX/PRO/DX INJ SAME DRUG ADON: CPT

## 2020-09-18 PROCEDURE — 99214 OFFICE O/P EST MOD 30 MIN: CPT | Mod: HCNC,,, | Performed by: INTERNAL MEDICINE

## 2020-09-18 PROCEDURE — 85025 COMPLETE CBC W/AUTO DIFF WBC: CPT | Mod: 91,HCNC

## 2020-09-18 PROCEDURE — 36430 TRANSFUSION BLD/BLD COMPNT: CPT | Mod: HCNC

## 2020-09-18 PROCEDURE — 96374 THER/PROPH/DIAG INJ IV PUSH: CPT

## 2020-09-18 PROCEDURE — 80053 COMPREHEN METABOLIC PANEL: CPT | Mod: HCNC

## 2020-09-18 PROCEDURE — 85610 PROTHROMBIN TIME: CPT | Mod: HCNC

## 2020-09-18 PROCEDURE — 63600175 PHARM REV CODE 636 W HCPCS: Mod: HCNC | Performed by: NURSE PRACTITIONER

## 2020-09-18 PROCEDURE — 99214 PR OFFICE/OUTPT VISIT, EST, LEVL IV, 30-39 MIN: ICD-10-PCS | Mod: HCNC,,, | Performed by: INTERNAL MEDICINE

## 2020-09-18 PROCEDURE — U0002 COVID-19 LAB TEST NON-CDC: HCPCS | Mod: HCNC

## 2020-09-18 RX ORDER — HYDRALAZINE HYDROCHLORIDE 20 MG/ML
10 INJECTION INTRAMUSCULAR; INTRAVENOUS EVERY 8 HOURS PRN
Status: DISCONTINUED | OUTPATIENT
Start: 2020-09-18 | End: 2020-09-22 | Stop reason: HOSPADM

## 2020-09-18 RX ORDER — INSULIN ASPART 100 [IU]/ML
0-5 INJECTION, SOLUTION INTRAVENOUS; SUBCUTANEOUS EVERY 6 HOURS PRN
Status: DISCONTINUED | OUTPATIENT
Start: 2020-09-18 | End: 2020-09-22 | Stop reason: HOSPADM

## 2020-09-18 RX ORDER — HYDROCODONE BITARTRATE AND ACETAMINOPHEN 500; 5 MG/1; MG/1
TABLET ORAL
Status: DISCONTINUED | OUTPATIENT
Start: 2020-09-18 | End: 2020-09-20

## 2020-09-18 RX ORDER — ATORVASTATIN CALCIUM 40 MG/1
80 TABLET, FILM COATED ORAL NIGHTLY
Status: DISCONTINUED | OUTPATIENT
Start: 2020-09-18 | End: 2020-09-22 | Stop reason: HOSPADM

## 2020-09-18 RX ORDER — IPRATROPIUM BROMIDE AND ALBUTEROL SULFATE 2.5; .5 MG/3ML; MG/3ML
3 SOLUTION RESPIRATORY (INHALATION) EVERY 6 HOURS PRN
Status: DISCONTINUED | OUTPATIENT
Start: 2020-09-18 | End: 2020-09-22 | Stop reason: HOSPADM

## 2020-09-18 RX ORDER — GLUCAGON 1 MG
1 KIT INJECTION
Status: DISCONTINUED | OUTPATIENT
Start: 2020-09-18 | End: 2020-09-22 | Stop reason: HOSPADM

## 2020-09-18 RX ORDER — ONDANSETRON 2 MG/ML
4 INJECTION INTRAMUSCULAR; INTRAVENOUS EVERY 8 HOURS PRN
Status: DISCONTINUED | OUTPATIENT
Start: 2020-09-18 | End: 2020-09-22 | Stop reason: HOSPADM

## 2020-09-18 RX ORDER — CLONAZEPAM 1 MG/1
1 TABLET ORAL NIGHTLY
Status: DISCONTINUED | OUTPATIENT
Start: 2020-09-18 | End: 2020-09-22 | Stop reason: HOSPADM

## 2020-09-18 RX ORDER — PANTOPRAZOLE SODIUM 40 MG/10ML
40 INJECTION, POWDER, LYOPHILIZED, FOR SOLUTION INTRAVENOUS 2 TIMES DAILY
Status: DISCONTINUED | OUTPATIENT
Start: 2020-09-18 | End: 2020-09-22 | Stop reason: HOSPADM

## 2020-09-18 RX ADMIN — ATORVASTATIN CALCIUM 80 MG: 40 TABLET, FILM COATED ORAL at 09:09

## 2020-09-18 RX ADMIN — PANTOPRAZOLE SODIUM 40 MG: 40 INJECTION, POWDER, LYOPHILIZED, FOR SOLUTION INTRAVENOUS at 09:09

## 2020-09-18 RX ADMIN — CLONAZEPAM 1 MG: 1 TABLET ORAL at 09:09

## 2020-09-18 RX ADMIN — PANTOPRAZOLE SODIUM 40 MG: 40 INJECTION, POWDER, LYOPHILIZED, FOR SOLUTION INTRAVENOUS at 11:09

## 2020-09-18 NOTE — ED NOTES
Patient's wife upset that patient is unable to eat right now. Wife states if patient is not seen by gastroenterologist soon, she is going to feed her  anyway. Reminded patient and patient's wife that the doctor does not want him to eat or drink right now. Patient's wife is upset that things are taking so long in the ER and states things were much faster at the [facility]. Charge nurse Teresa and Dr. Lacey notified.

## 2020-09-18 NOTE — ED PROVIDER NOTES
SCRIBE #1 NOTE: I, Bernadette Gutierrez, am scribing for, and in the presence of, Jovanni Lacey MD. I have scribed the entire note.       History     Chief Complaint   Patient presents with    Rectal Bleeding     Review of patient's allergies indicates:  No Known Allergies      History of Present Illness     HPI    9/18/2020, 8:44 AM  History obtained from the patient      History of Present Illness: Jake Ortiz is a 80 y.o. male patient with a PMHx of Afib, CHF, CAD, DM, HLD, HTN, and MI who presents to the Emergency Department for evaluation of rectal bleeding which onset gradually 3-4 days ago. Reports maroon-colored blood in stool. Symptoms are constant and moderate in severity. No mitigating or exacerbating factors reported. Associated sxs include generalized weakness. Patient denies any fever, chills, abdominal pain, n/v, constipation, dizziness, lightheadedness, CP, SOB, and all other sxs at this time. No prior Tx. No further complaints or concerns at this time.       Arrival mode: AASI    PCP: Chanda Brush MD        Past Medical History:  Past Medical History:   Diagnosis Date    Abnormal PFT     Anemia     Arthritis     Atrial fibrillation 10/19/2017    Back pain     Congestive heart failure 3/5/2018    Coronary artery disease     Diabetes mellitus 01/2018     am 02/27/2018    DM (diabetes mellitus) 2018     am 06/23/2020    Hyperlipemia     Hypertension     Myocardial infarction     Obesity     NASREEN (obstructive sleep apnea) 6/5/2018    Prostate cancer 2015    Tobacco dependence     Type 2 diabetes mellitus        Past Surgical History:  Past Surgical History:   Procedure Laterality Date    CORONARY ARTERY BYPASS GRAFT  1987    EPIDURAL STEROID INJECTION N/A 11/22/2019    Procedure: Lumbar L5/S1 IL XUAN;  Surgeon: Tacho Trivedi MD;  Location: [facility];  Service: Pain Management;  Laterality: N/A;    EPIDURAL STEROID INJECTION N/A 1/6/2020    Procedure:  Lumbar L5/S1 IL XUAN;  Surgeon: Tacho Trivedi MD;  Location: [facility];  Service: Pain Management;  Laterality: N/A;    EPIDURAL STEROID INJECTION N/A 2/10/2020    Procedure: Caudal XUAN;  Surgeon: Tacho Trivedi MD;  Location: [facility];  Service: Pain Management;  Laterality: N/A;    PROSTATE SURGERY      prostate radiation    SPINE SURGERY      fusion    TONSILLECTOMY           Family History:  Family History   Problem Relation Age of Onset    Heart attack Mother     Diabetes Mother     Heart disease Mother     Cataracts Mother     Stroke Father     Heart disease Father     Heart disease Brother        Social History:  Social History     Tobacco Use    Smoking status: Former Smoker     Packs/day: 1.50     Years: 20.00     Pack years: 30.00     Types: Cigarettes     Start date:      Quit date:      Years since quittin.7    Smokeless tobacco: Never Used   Substance and Sexual Activity    Alcohol use: No     Frequency: Never     Binge frequency: Never    Drug use: No    Sexual activity: Not Currently        Review of Systems     Review of Systems   Constitutional: Negative for activity change, chills, diaphoresis and fever.   HENT: Negative for congestion, rhinorrhea, sneezing, sore throat and trouble swallowing.    Eyes: Negative for pain.   Respiratory: Negative for cough, chest tightness, shortness of breath, wheezing and stridor.    Cardiovascular: Negative for chest pain, palpitations and leg swelling.   Gastrointestinal: Positive for anal bleeding and blood in stool. Negative for abdominal distention, abdominal pain, constipation, diarrhea, nausea and vomiting.   Genitourinary: Negative for difficulty urinating, dysuria, frequency and urgency.   Musculoskeletal: Negative for arthralgias, back pain, myalgias, neck pain and neck stiffness.   Skin: Negative for pallor, rash and wound.   Neurological: Negative for dizziness, syncope, weakness, light-headedness, numbness  and headaches.   Hematological: Does not bruise/bleed easily.   All other systems reviewed and are negative.     Physical Exam     Initial Vitals [09/18/20 0833]   BP Pulse Resp Temp SpO2   (!) 120/50 70 20 98.6 °F (37 °C) 99 %      MAP       --          Physical Exam  Nursing Notes and Vital Signs Reviewed.  Constitutional: Patient is in no acute distress. Well-developed and well-nourished.  Head: Atraumatic. Normocephalic.  Eyes: PERRL. EOM intact. Conjunctivae are not pale. No scleral icterus.  ENT: Mucous membranes are moist. Oropharynx is clear and symmetric.    Neck: Supple. Full ROM. No lymphadenopathy.  Cardiovascular: Regular rate. Regular rhythm. No murmurs, rubs, or gallops. Distal pulses are 2+ and symmetric.  Pulmonary/Chest: No respiratory distress. Clear to auscultation bilaterally. No wheezing or rales.  Abdominal: Soft and non-distended.  There is no tenderness.  No rebound, guarding, or rigidity. Good bowel sounds.  Genitourinary: No CVA tenderness  Musculoskeletal: Moves all extremities. No obvious deformities. No edema. No calf tenderness.  Skin: Warm and dry.  Neurological:  Alert, awake, and appropriate.  Normal speech.  No acute focal neurological deficits are appreciated.  Psychiatric: Normal affect. Good eye contact. Appropriate in content.     ED Course   Critical Care    Date/Time: 9/18/2020 10:26 AM  Performed by: Jovanni Lacey MD  Authorized by: Jovanni Lcaey MD   Direct patient critical care time: 26 minutes  Additional history critical care time: 9 minutes  Ordering / reviewing critical care time: 15 minutes  Documentation critical care time: 5 minutes  Consulting other physicians critical care time: 5 minutes  Consult with family critical care time: 0 minutes  Other critical care time: 0 minutes  Total critical care time (exclusive of procedural time) : 60 minutes  Critical care time was exclusive of separately billable procedures and treating other patients and teaching  "time.  Critical care was necessary to treat or prevent imminent or life-threatening deterioration of the following conditions: symptomatic anemia.  Critical care was time spent personally by me on the following activities: blood draw for specimens, development of treatment plan with patient or surrogate, discussions with consultants, interpretation of cardiac output measurements, evaluation of patient's response to treatment, examination of patient, obtaining history from patient or surrogate, ordering and review of laboratory studies, ordering and performing treatments and interventions, ordering and review of radiographic studies, pulse oximetry, re-evaluation of patient's condition and review of old charts.        ED Vital Signs:  Vitals:    09/18/20 0833 09/18/20 0852 09/18/20 0853 09/18/20 0854   BP: (!) 120/50 (!) 129/59 (!) 116/54 134/63   Pulse: 70 63 71 70   Resp: 20      Temp: 98.6 °F (37 °C)      TempSrc: Oral      SpO2: 99%      Weight:       Height: 5' 9" (1.753 m)       09/18/20 0902 09/18/20 0917 09/18/20 0933 09/18/20 0946   BP: (!) 128/59 135/63 132/60    Pulse: 65 61 61 63   Resp: 20 18 18 17   Temp:       TempSrc:       SpO2: 99% 100% 100% 100%   Weight:       Height:        09/18/20 0947 09/18/20 1002 09/18/20 1003 09/18/20 1017   BP: (!) 115/54 (!) 154/67  128/62   Pulse: 62 67  65   Resp:       Temp:       TempSrc:       SpO2: 100% 100%  100%   Weight:   101.7 kg (224 lb 3.3 oz)    Height:        09/18/20 1032   BP: (!) 130/59   Pulse: 62   Resp:    Temp:    TempSrc:    SpO2: 100%   Weight:    Height:        Abnormal Lab Results:  Labs Reviewed   CBC W/ AUTO DIFFERENTIAL - Abnormal; Notable for the following components:       Result Value    RBC 1.72 (*)     Hemoglobin 5.1 (*)     Hematocrit 16.9 (*)     Mean Corpuscular Hemoglobin Conc 30.2 (*)     Immature Granulocytes 0.6 (*)     Lymph # 0.8 (*)     Lymph% 16.1 (*)     All other components within normal limits    Narrative:        critical " result(s) called and verbal readback obtained from ALESHA ESPINAL RN by SURJIT 09/18/2020 09:54   COMPREHENSIVE METABOLIC PANEL - Abnormal; Notable for the following components:    CO2 21 (*)     Glucose 131 (*)     BUN, Bld 40 (*)     Creatinine 1.9 (*)     Alkaline Phosphatase 47 (*)     ALT 7 (*)     eGFR if  38 (*)     eGFR if non  33 (*)     All other components within normal limits   PROTIME-INR - Abnormal; Notable for the following components:    Prothrombin Time 12.7 (*)     All other components within normal limits   URINALYSIS, REFLEX TO URINE CULTURE    Narrative:     Specimen Source->Urine   APTT   SARS-COV-2 RNA AMPLIFICATION, QUAL   TYPE & SCREEN   PREPARE RBC SOFT        All Lab Results:  Results for orders placed or performed during the hospital encounter of 09/18/20   CBC auto differential   Result Value Ref Range    WBC 4.90 3.90 - 12.70 K/uL    RBC 1.72 (L) 4.60 - 6.20 M/uL    Hemoglobin 5.1 (LL) 14.0 - 18.0 g/dL    Hematocrit 16.9 (LL) 40.0 - 54.0 %    Mean Corpuscular Volume 98 82 - 98 fL    Mean Corpuscular Hemoglobin 29.7 27.0 - 31.0 pg    Mean Corpuscular Hemoglobin Conc 30.2 (L) 32.0 - 36.0 g/dL    RDW 14.3 11.5 - 14.5 %    Platelets 296 150 - 350 K/uL    MPV 9.3 9.2 - 12.9 fL    Immature Granulocytes 0.6 (H) 0.0 - 0.5 %    Gran # (ANC) 3.5 1.8 - 7.7 K/uL    Immature Grans (Abs) 0.03 0.00 - 0.04 K/uL    Lymph # 0.8 (L) 1.0 - 4.8 K/uL    Mono # 0.5 0.3 - 1.0 K/uL    Eos # 0.1 0.0 - 0.5 K/uL    Baso # 0.02 0.00 - 0.20 K/uL    nRBC 0 0 /100 WBC    Gran% 71.3 38.0 - 73.0 %    Lymph% 16.1 (L) 18.0 - 48.0 %    Mono% 10.2 4.0 - 15.0 %    Eosinophil% 1.4 0.0 - 8.0 %    Basophil% 0.4 0.0 - 1.9 %    Differential Method Automated    Comprehensive metabolic panel   Result Value Ref Range    Sodium 138 136 - 145 mmol/L    Potassium 4.7 3.5 - 5.1 mmol/L    Chloride 108 95 - 110 mmol/L    CO2 21 (L) 23 - 29 mmol/L    Glucose 131 (H) 70 - 110 mg/dL    BUN, Bld 40 (H) 8 - 23  mg/dL    Creatinine 1.9 (H) 0.5 - 1.4 mg/dL    Calcium 8.7 8.7 - 10.5 mg/dL    Total Protein 6.7 6.0 - 8.4 g/dL    Albumin 3.7 3.5 - 5.2 g/dL    Total Bilirubin 0.2 0.1 - 1.0 mg/dL    Alkaline Phosphatase 47 (L) 55 - 135 U/L    AST 11 10 - 40 U/L    ALT 7 (L) 10 - 44 U/L    Anion Gap 9 8 - 16 mmol/L    eGFR if African American 38 (A) >60 mL/min/1.73 m^2    eGFR if non African American 33 (A) >60 mL/min/1.73 m^2   Urinalysis, Reflex to Urine Culture Urine, Clean Catch    Specimen: Urine   Result Value Ref Range    Specimen UA Urine, Clean Catch     Color, UA Yellow Yellow, Straw, Beth    Appearance, UA Clear Clear    pH, UA 7.0 5.0 - 8.0    Specific Gravity, UA 1.010 1.005 - 1.030    Protein, UA Negative Negative    Glucose, UA Negative Negative    Ketones, UA Negative Negative    Bilirubin (UA) Negative Negative    Occult Blood UA Negative Negative    Nitrite, UA Negative Negative    Urobilinogen, UA Negative <2.0 EU/dL    Leukocytes, UA Negative Negative   APTT   Result Value Ref Range    aPTT 28.1 21.0 - 32.0 sec   Protime-INR   Result Value Ref Range    Prothrombin Time 12.7 (H) 9.0 - 12.5 sec    INR 1.2 0.8 - 1.2   COVID-19 Rapid Screening   Result Value Ref Range    SARS-CoV-2 RNA, Amplification, Qual Negative Negative   Type & Screen   Result Value Ref Range    Group & Rh O POS     Indirect Keshav NEG               The Emergency Provider reviewed the vital signs and test results, which are outlined above.     ED Discussion     10:25 AM: Discussed case with DOMINGO Regalado ([facility]). Dr. Warren agrees with current care and management of pt and accepts admission.   Admitting Service: hospital medicine  Admitting Physician: Dr. Warren  Admit to: obs tele    10:26 AM: Re-evaluated pt. I have discussed test results, shared treatment plan, and the need for admission with patient and family at bedside. Pt and family express understanding at this time and agree with all information. All questions  answered. Pt and family have no further questions or concerns at this time. Pt is ready for admit.    10:31 AM: Dr. Farmer (GI) aware of pt's case. Will consult.        Medical Decision Making:   Clinical Tests:   Lab Tests: Ordered and Reviewed           ED Medication(s):  Medications   0.9%  NaCl infusion (for blood administration) (has no administration in time range)         Scribe Attestation:   Scribe #1: I performed the above scribed service and the documentation accurately describes the services I performed. I attest to the accuracy of the note.     Attending:   Physician Attestation Statement for Scribe #1: I, Jovanni Lacey MD, personally performed the services described in this documentation, as scribed by Bernadette Gutierrez, in my presence, and it is both accurate and complete.           Clinical Impression       ICD-10-CM ICD-9-CM   1. Rectal bleeding  K62.5 569.3   2. Lower GI bleed  K92.2 578.9   3. Acute blood loss anemia  D62 285.1       Disposition:   Disposition: Placed in Observation  Condition: Fair         Jovanni Lacey MD  09/18/20 1051

## 2020-09-18 NOTE — H&P
[facility] Hospital Medicine  History & Physical    Patient Name: Jake Ortiz  MRN: [id number]  Admission Date: 9/18/2020  Attending Physician: Henry Nobles MD   Primary Care Provider: Chanda Brush MD         Patient information was obtained from patient and ER records.     Subjective:     Principal Problem:Rectal bleeding    Chief Complaint:   Chief Complaint   Patient presents with    Rectal Bleeding        HPI:  Jake Ortiz is a 80 y.o. male patient with a PMHx of Afib, CHF, CAD, DM, HLD, HTN, and MI who presents to the Emergency Department for evaluation of rectal bleeding which onset gradually 3-4 days ago. Reports maroon-colored blood in stool. Symptoms are constant and moderate in severity. No mitigating or exacerbating factors reported. Associated sxs include generalized weakness. Patient denies any fever, chills, abdominal pain, n/v, constipation, dizziness, lightheadedness, CP, SOB, and all other sxs at this time. ED workup showed: H/H 5.1/16.9, BUN 40, Creatinine 1.9. GI was consulted in the ED. Patient placed in observation for GI bleed.     Past Medical History:   Diagnosis Date    Abnormal PFT     Anemia     Arthritis     Atrial fibrillation 10/19/2017    Back pain     Congestive heart failure 3/5/2018    Coronary artery disease     Diabetes mellitus 01/2018     am 02/27/2018    DM (diabetes mellitus) 2018     am 06/23/2020    Hyperlipemia     Hypertension     Myocardial infarction     Obesity     NASREEN (obstructive sleep apnea) 6/5/2018    Prostate cancer 2015    Tobacco dependence     Type 2 diabetes mellitus        Past Surgical History:   Procedure Laterality Date    CORONARY ARTERY BYPASS GRAFT  1987    EPIDURAL STEROID INJECTION N/A 11/22/2019    Procedure: Lumbar L5/S1 IL XUAN;  Surgeon: Tacho Trivedi MD;  Location: [facility] PAIN T;  Service: Pain Management;  Laterality: N/A;    EPIDURAL STEROID INJECTION N/A 1/6/2020     Procedure: Lumbar L5/S1 IL UXAN;  Surgeon: Tacho Trivedi MD;  Location: [facility] PAIN [facility]GT;  Service: Pain Management;  Laterality: N/A;    EPIDURAL STEROID INJECTION N/A 2/10/2020    Procedure: Caudal XUAN;  Surgeon: Tacho Trivedi MD;  Location: [facility] PAIN [facility]GT;  Service: Pain Management;  Laterality: N/A;    PROSTATE SURGERY      prostate radiation    SPINE SURGERY      fusion    TONSILLECTOMY         Review of patient's allergies indicates:  No Known Allergies    Current Facility-Administered Medications on File Prior to Encounter   Medication    ondansetron injection 4 mg    ondansetron injection 4 mg     Current Outpatient Medications on File Prior to Encounter   Medication Sig    acetaminophen (TYLENOL) 500 MG tablet Take 500 mg by mouth every 6 (six) hours as needed for Pain.    apixaban (ELIQUIS) 5 mg Tab Take 1 tablet (5 mg total) by mouth 2 (two) times daily.    aspirin (ECOTRIN) 81 MG EC tablet Take 81 mg by mouth.    atorvastatin (LIPITOR) 80 MG tablet TK 1 T PO D    cholecalciferol, vitamin D3, (VITAMIN D3) 1,000 unit capsule Take 5,000 Units by mouth once daily.    clonazePAM (KLONOPIN) 1 MG tablet Take 1 tablet (1 mg total) by mouth every evening.    clopidogrel (PLAVIX) 75 mg tablet Take 75 mg by mouth once daily.    ENTRESTO 24-26 mg per tablet     fish oil-omega-3 fatty acids 300-1,000 mg capsule Take 1 capsule by mouth once daily.    furosemide (LASIX) 40 MG tablet Take 1 tablet (40 mg total) by mouth once daily. Take extra dose as needed for swelling or weight gain 3 lbs    gabapentin (NEURONTIN) 300 MG capsule Take 2 capsules (600 mg total) by mouth every evening. Can take 3 caps qhs or tid if tolerated, may cause drowsiness    levocetirizine (XYZAL) 5 MG tablet Take 1 tablet (5 mg total) by mouth every evening.    multivitamin capsule Take 1 capsule by mouth once daily.    spironolactone (ALDACTONE) 25 MG tablet Take 1 tablet (25 mg total) by mouth once daily.    blood sugar  diagnostic Strp Check blood glucose levels daily in the AM fasting and 1-2 times more daily.    blood-glucose meter kit Use as instructed    fluticasone propionate (FLONASE) 50 mcg/actuation nasal spray 2 sprays (100 mcg total) by Each Nostril route once daily.    lancets Misc Check blood glucose levels daily in the AM fasting and 1-2 times more daily.    nitroGLYCERIN (NITROSTAT) 0.3 MG SL tablet PLACE 1 TABLET UNDER THE TONGUE EVERY 5 MINUTES AS NEEDED FOR CHEST PAIN AS DIRECTED     Family History     Problem Relation (Age of Onset)    Cataracts Mother    Diabetes Mother    Heart attack Mother    Heart disease Mother, Father, Brother    Stroke Father        Tobacco Use    Smoking status: Former Smoker     Packs/day: 1.50     Years: 20.00     Pack years: 30.00     Types: Cigarettes     Start date:      Quit date:      Years since quittin.7    Smokeless tobacco: Never Used   Substance and Sexual Activity    Alcohol use: No     Frequency: Never     Binge frequency: Never    Drug use: No    Sexual activity: Not Currently     Review of Systems   Constitutional: Negative.  Negative for activity change, appetite change, fatigue and fever.   HENT: Negative.    Eyes: Negative.    Respiratory: Negative.    Cardiovascular: Negative.    Gastrointestinal: Positive for blood in stool. Negative for abdominal pain, diarrhea, nausea and vomiting.   Endocrine: Negative.    Genitourinary: Negative.  Negative for dysuria, frequency, hematuria and urgency.   Musculoskeletal: Negative.    Skin: Negative.    Allergic/Immunologic: Negative.    Neurological: Positive for weakness. Negative for dizziness and headaches.   Hematological: Negative.    Psychiatric/Behavioral: Negative.      Objective:     Vital Signs (Most Recent):  Temp: 98.6 °F (37 °C) (20 0833)  Pulse: 62 (20 1032)  Resp: 17 (20 0946)  BP: (!) 130/59 (20 1032)  SpO2: 100 % (20 1032) Vital Signs (24h Range):  Temp:  [98.6  °F (37 °C)] 98.6 °F (37 °C)  Pulse:  [61-71] 62  Resp:  [17-20] 17  SpO2:  [99 %-100 %] 100 %  BP: (115-154)/(50-67) 130/59     Weight: 101.7 kg (224 lb 3.3 oz)  Body mass index is 33.11 kg/m².    Physical Exam  Vitals signs and nursing note reviewed.   Constitutional:       Appearance: Normal appearance. He is well-developed. He is obese.   HENT:      Head: Normocephalic and atraumatic.   Eyes:      Pupils: Pupils are equal, round, and reactive to light.   Neck:      Musculoskeletal: Normal range of motion and neck supple.   Cardiovascular:      Rate and Rhythm: Normal rate and regular rhythm.      Pulses: Normal pulses.      Heart sounds: Normal heart sounds.   Pulmonary:      Effort: Pulmonary effort is normal. No tachypnea, accessory muscle usage or respiratory distress.      Breath sounds: Normal breath sounds.   Abdominal:      General: Bowel sounds are normal.      Palpations: Abdomen is soft.      Tenderness: There is no abdominal tenderness.   Musculoskeletal: Normal range of motion.   Skin:     General: Skin is warm and dry.      Capillary Refill: Capillary refill takes less than 2 seconds.      Coloration: Skin is pale.   Neurological:      Mental Status: He is alert and oriented to person, place, and time.      Deep Tendon Reflexes: Reflexes are normal and symmetric.   Psychiatric:         Speech: Speech normal.         Behavior: Behavior normal.         Thought Content: Thought content normal.         Judgment: Judgment normal.            Significant Labs:   BMP:   Recent Labs   Lab 09/18/20  0922   *      K 4.7      CO2 21*   BUN 40*   CREATININE 1.9*   CALCIUM 8.7     CBC:   Recent Labs   Lab 09/18/20  0922   WBC 4.90   HGB 5.1*   HCT 16.9*        CMP:   Recent Labs   Lab 09/17/20  1353 09/18/20  0922    138   K 4.5 4.7    108   CO2 22* 21*   * 131*   BUN 45* 40*   CREATININE 2.3* 1.9*   CALCIUM 8.7 8.7   PROT 6.9 6.7   ALBUMIN 3.8 3.7   BILITOT 0.2 0.2    ALKPHOS 50* 47*   AST 11 11   ALT 8* 7*   ANIONGAP 11 9   EGFRNONAA 26.2* 33*     All pertinent labs within the past 24 hours have been reviewed.    Significant Imaging:   Imaging Results    None       Assessment/Plan:     * Rectal bleeding  GI consult   Transfuse 2 units of PRBCs   Serial CBC   Protonix BID         VAIBHAV (acute kidney injury)  Creatinine 1.9 (baseline 1.5)  Avoid nephrotoxic meds   Hold diuretics   Daily BMP       Congestive heart failure  Appears compensated  Will HOLD Entresto due to VAIBHAV   Will hold diuretics at this time    Monitor for volume overload       Atrial fibrillation  Heart rate controlled  Eliquis on hold due to GI bleed       Mixed restrictive and obstructive lung disease  Duoneb tx  Supplemental oxygen       CAD (coronary artery disease)  Stable   ASA, Plavix on hold due to GI bleed   Resume statin       Hyperlipemia  Continue statin       Hypertension  Pt NPO   IV hydralazine prn   Bp stable         VTE Risk Mitigation (From admission, onward)         Ordered     IP VTE HIGH RISK PATIENT  Once      09/18/20 1107     Place sequential compression device  Until discontinued      09/18/20 1107                   Denisa Bhatti NP  Department of Hospital Medicine   [facility]

## 2020-09-18 NOTE — PLAN OF CARE
Discussed poc with pt, pt verbalized understanding    modified visitor policy per CDC guidelines  allowing one visitor from 8a-6pm    Purposeful rounding every 2hours    VS wnl  Cardiac monitoring in use, pt is in a paced rhythym, tele monitor # [id number]  Blood glucose monitoring   Fall precautions in place, remains injury free  Pt denies c/o nausea and pain    2units PRBCs given    Accurate I&Os  Abx given as prescribed  Bed locked at lowest position  Call light within reach    Chart check complete  Will cont to monitor

## 2020-09-18 NOTE — NURSING
Spoke with XENA schultz, placed diet order, pt will most likely not be scoped until effects of plavix and asa has worn off...5days min

## 2020-09-18 NOTE — ASSESSMENT & PLAN NOTE
Appears compensated  Will HOLD Entresto due to VAIBHAV   Will hold diuretics at this time    Monitor for volume overload

## 2020-09-18 NOTE — SUBJECTIVE & OBJECTIVE
Past Medical History:   Diagnosis Date    Abnormal PFT     Anemia     Arthritis     Atrial fibrillation 10/19/2017    Back pain     Congestive heart failure 3/5/2018    Coronary artery disease     Diabetes mellitus 01/2018     am 02/27/2018    DM (diabetes mellitus) 2018     am 06/23/2020    Hyperlipemia     Hypertension     Myocardial infarction     Obesity     NASREEN (obstructive sleep apnea) 6/5/2018    Prostate cancer 2015    Tobacco dependence     Type 2 diabetes mellitus        Past Surgical History:   Procedure Laterality Date    CORONARY ARTERY BYPASS GRAFT  1987    EPIDURAL STEROID INJECTION N/A 11/22/2019    Procedure: Lumbar L5/S1 IL XUAN;  Surgeon: Tacho Trivedi MD;  Location: [facility] PAIN MGT;  Service: Pain Management;  Laterality: N/A;    EPIDURAL STEROID INJECTION N/A 1/6/2020    Procedure: Lumbar L5/S1 IL XUAN;  Surgeon: Tacho Trivedi MD;  Location: [facility] PAIN MGT;  Service: Pain Management;  Laterality: N/A;    EPIDURAL STEROID INJECTION N/A 2/10/2020    Procedure: Caudal XUAN;  Surgeon: Tacho Trivedi MD;  Location: [facility] PAIN MGT;  Service: Pain Management;  Laterality: N/A;    PROSTATE SURGERY      prostate radiation    SPINE SURGERY      fusion    TONSILLECTOMY         Review of patient's allergies indicates:  No Known Allergies    Current Facility-Administered Medications on File Prior to Encounter   Medication    ondansetron injection 4 mg    ondansetron injection 4 mg     Current Outpatient Medications on File Prior to Encounter   Medication Sig    acetaminophen (TYLENOL) 500 MG tablet Take 500 mg by mouth every 6 (six) hours as needed for Pain.    apixaban (ELIQUIS) 5 mg Tab Take 1 tablet (5 mg total) by mouth 2 (two) times daily.    aspirin (ECOTRIN) 81 MG EC tablet Take 81 mg by mouth.    atorvastatin (LIPITOR) 80 MG tablet TK 1 T PO D    cholecalciferol, vitamin D3, (VITAMIN D3) 1,000 unit capsule Take 5,000 Units by mouth once daily.     clonazePAM (KLONOPIN) 1 MG tablet Take 1 tablet (1 mg total) by mouth every evening.    clopidogrel (PLAVIX) 75 mg tablet Take 75 mg by mouth once daily.    ENTRESTO 24-26 mg per tablet     fish oil-omega-3 fatty acids 300-1,000 mg capsule Take 1 capsule by mouth once daily.    furosemide (LASIX) 40 MG tablet Take 1 tablet (40 mg total) by mouth once daily. Take extra dose as needed for swelling or weight gain 3 lbs    gabapentin (NEURONTIN) 300 MG capsule Take 2 capsules (600 mg total) by mouth every evening. Can take 3 caps qhs or tid if tolerated, may cause drowsiness    levocetirizine (XYZAL) 5 MG tablet Take 1 tablet (5 mg total) by mouth every evening.    multivitamin capsule Take 1 capsule by mouth once daily.    spironolactone (ALDACTONE) 25 MG tablet Take 1 tablet (25 mg total) by mouth once daily.    blood sugar diagnostic Strp Check blood glucose levels daily in the AM fasting and 1-2 times more daily.    blood-glucose meter kit Use as instructed    fluticasone propionate (FLONASE) 50 mcg/actuation nasal spray 2 sprays (100 mcg total) by Each Nostril route once daily.    lancets Misc Check blood glucose levels daily in the AM fasting and 1-2 times more daily.    nitroGLYCERIN (NITROSTAT) 0.3 MG SL tablet PLACE 1 TABLET UNDER THE TONGUE EVERY 5 MINUTES AS NEEDED FOR CHEST PAIN AS DIRECTED     Family History     Problem Relation (Age of Onset)    Cataracts Mother    Diabetes Mother    Heart attack Mother    Heart disease Mother, Father, Brother    Stroke Father        Tobacco Use    Smoking status: Former Smoker     Packs/day: 1.50     Years: 20.00     Pack years: 30.00     Types: Cigarettes     Start date:      Quit date:      Years since quittin.7    Smokeless tobacco: Never Used   Substance and Sexual Activity    Alcohol use: No     Frequency: Never     Binge frequency: Never    Drug use: No    Sexual activity: Not Currently     Review of Systems   Constitutional: Negative.   Negative for activity change, appetite change, fatigue and fever.   HENT: Negative.    Eyes: Negative.    Respiratory: Negative.    Cardiovascular: Negative.    Gastrointestinal: Positive for blood in stool. Negative for abdominal pain, diarrhea, nausea and vomiting.   Endocrine: Negative.    Genitourinary: Negative.  Negative for dysuria, frequency, hematuria and urgency.   Musculoskeletal: Negative.    Skin: Negative.    Allergic/Immunologic: Negative.    Neurological: Positive for weakness. Negative for dizziness and headaches.   Hematological: Negative.    Psychiatric/Behavioral: Negative.      Objective:     Vital Signs (Most Recent):  Temp: 98.6 °F (37 °C) (09/18/20 0833)  Pulse: 62 (09/18/20 1032)  Resp: 17 (09/18/20 0946)  BP: (!) 130/59 (09/18/20 1032)  SpO2: 100 % (09/18/20 1032) Vital Signs (24h Range):  Temp:  [98.6 °F (37 °C)] 98.6 °F (37 °C)  Pulse:  [61-71] 62  Resp:  [17-20] 17  SpO2:  [99 %-100 %] 100 %  BP: (115-154)/(50-67) 130/59     Weight: 101.7 kg (224 lb 3.3 oz)  Body mass index is 33.11 kg/m².    Physical Exam  Vitals signs and nursing note reviewed.   Constitutional:       Appearance: Normal appearance. He is well-developed. He is obese.   HENT:      Head: Normocephalic and atraumatic.   Eyes:      Pupils: Pupils are equal, round, and reactive to light.   Neck:      Musculoskeletal: Normal range of motion and neck supple.   Cardiovascular:      Rate and Rhythm: Normal rate and regular rhythm.      Pulses: Normal pulses.      Heart sounds: Normal heart sounds.   Pulmonary:      Effort: Pulmonary effort is normal. No tachypnea, accessory muscle usage or respiratory distress.      Breath sounds: Normal breath sounds.   Abdominal:      General: Bowel sounds are normal.      Palpations: Abdomen is soft.      Tenderness: There is no abdominal tenderness.   Musculoskeletal: Normal range of motion.   Skin:     General: Skin is warm and dry.      Capillary Refill: Capillary refill takes less than 2  seconds.      Coloration: Skin is pale.   Neurological:      Mental Status: He is alert and oriented to person, place, and time.      Deep Tendon Reflexes: Reflexes are normal and symmetric.   Psychiatric:         Speech: Speech normal.         Behavior: Behavior normal.         Thought Content: Thought content normal.         Judgment: Judgment normal.            Significant Labs:   BMP:   Recent Labs   Lab 09/18/20  0922   *      K 4.7      CO2 21*   BUN 40*   CREATININE 1.9*   CALCIUM 8.7     CBC:   Recent Labs   Lab 09/18/20 0922   WBC 4.90   HGB 5.1*   HCT 16.9*        CMP:   Recent Labs   Lab 09/17/20  1353 09/18/20  0922    138   K 4.5 4.7    108   CO2 22* 21*   * 131*   BUN 45* 40*   CREATININE 2.3* 1.9*   CALCIUM 8.7 8.7   PROT 6.9 6.7   ALBUMIN 3.8 3.7   BILITOT 0.2 0.2   ALKPHOS 50* 47*   AST 11 11   ALT 8* 7*   ANIONGAP 11 9   EGFRNONAA 26.2* 33*     All pertinent labs within the past 24 hours have been reviewed.    Significant Imaging:   Imaging Results    None

## 2020-09-18 NOTE — PLAN OF CARE
09/18/20 0920   ED Admissions Case Approval   ED Admissions Case Approval In Process     Initial review for possible hospitalization in process.Awaiting additional labs.  TB/RN

## 2020-09-18 NOTE — HPI
Jake Ortiz is a 80 y.o. male patient with a PMHx of Afib, CHF, CAD, DM, HLD, HTN, and MI who presents to the Emergency Department for evaluation of rectal bleeding which onset gradually 3-4 days ago. Reports maroon-colored blood in stool. Symptoms are constant and moderate in severity. No mitigating or exacerbating factors reported. Associated sxs include generalized weakness. Patient denies any fever, chills, abdominal pain, n/v, constipation, dizziness, lightheadedness, CP, SOB, and all other sxs at this time. ED workup showed: H/H 5.1/16.9, BUN 40, Creatinine 1.9. GI was consulted in the ED. Patient placed in observation for GI bleed.

## 2020-09-19 PROBLEM — K92.2 LOWER GI BLEED: Status: ACTIVE | Noted: 2020-09-18

## 2020-09-19 LAB
ANION GAP SERPL CALC-SCNC: 9 MMOL/L (ref 8–16)
BASOPHILS # BLD AUTO: 0.04 K/UL (ref 0–0.2)
BASOPHILS # BLD AUTO: 0.04 K/UL (ref 0–0.2)
BASOPHILS NFR BLD: 0.6 % (ref 0–1.9)
BASOPHILS NFR BLD: 0.7 % (ref 0–1.9)
BUN SERPL-MCNC: 37 MG/DL (ref 8–23)
CALCIUM SERPL-MCNC: 8.4 MG/DL (ref 8.7–10.5)
CHLORIDE SERPL-SCNC: 108 MMOL/L (ref 95–110)
CO2 SERPL-SCNC: 22 MMOL/L (ref 23–29)
CREAT SERPL-MCNC: 1.6 MG/DL (ref 0.5–1.4)
DIFFERENTIAL METHOD: ABNORMAL
DIFFERENTIAL METHOD: ABNORMAL
EOSINOPHIL # BLD AUTO: 0.1 K/UL (ref 0–0.5)
EOSINOPHIL # BLD AUTO: 0.1 K/UL (ref 0–0.5)
EOSINOPHIL NFR BLD: 2.2 % (ref 0–8)
EOSINOPHIL NFR BLD: 2.4 % (ref 0–8)
ERYTHROCYTE [DISTWIDTH] IN BLOOD BY AUTOMATED COUNT: 15.5 % (ref 11.5–14.5)
ERYTHROCYTE [DISTWIDTH] IN BLOOD BY AUTOMATED COUNT: 15.6 % (ref 11.5–14.5)
EST. GFR  (AFRICAN AMERICAN): 47 ML/MIN/1.73 M^2
EST. GFR  (NON AFRICAN AMERICAN): 41 ML/MIN/1.73 M^2
GLUCOSE SERPL-MCNC: 109 MG/DL (ref 70–110)
HCT VFR BLD AUTO: 22.4 % (ref 40–54)
HCT VFR BLD AUTO: 23.3 % (ref 40–54)
HGB BLD-MCNC: 7.1 G/DL (ref 14–18)
HGB BLD-MCNC: 7.4 G/DL (ref 14–18)
IMM GRANULOCYTES # BLD AUTO: 0.02 K/UL (ref 0–0.04)
IMM GRANULOCYTES # BLD AUTO: 0.03 K/UL (ref 0–0.04)
IMM GRANULOCYTES NFR BLD AUTO: 0.3 % (ref 0–0.5)
IMM GRANULOCYTES NFR BLD AUTO: 0.5 % (ref 0–0.5)
LYMPHOCYTES # BLD AUTO: 1 K/UL (ref 1–4.8)
LYMPHOCYTES # BLD AUTO: 1.3 K/UL (ref 1–4.8)
LYMPHOCYTES NFR BLD: 16 % (ref 18–48)
LYMPHOCYTES NFR BLD: 20.2 % (ref 18–48)
MCH RBC QN AUTO: 29.3 PG (ref 27–31)
MCH RBC QN AUTO: 29.7 PG (ref 27–31)
MCHC RBC AUTO-ENTMCNC: 31.7 G/DL (ref 32–36)
MCHC RBC AUTO-ENTMCNC: 31.8 G/DL (ref 32–36)
MCV RBC AUTO: 93 FL (ref 82–98)
MCV RBC AUTO: 94 FL (ref 82–98)
MONOCYTES # BLD AUTO: 0.6 K/UL (ref 0.3–1)
MONOCYTES # BLD AUTO: 0.6 K/UL (ref 0.3–1)
MONOCYTES NFR BLD: 9.3 % (ref 4–15)
MONOCYTES NFR BLD: 9.6 % (ref 4–15)
NEUTROPHILS # BLD AUTO: 4.2 K/UL (ref 1.8–7.7)
NEUTROPHILS # BLD AUTO: 4.2 K/UL (ref 1.8–7.7)
NEUTROPHILS NFR BLD: 67.2 % (ref 38–73)
NEUTROPHILS NFR BLD: 71 % (ref 38–73)
NRBC BLD-RTO: 0 /100 WBC
NRBC BLD-RTO: 0 /100 WBC
PLATELET # BLD AUTO: 270 K/UL (ref 150–350)
PLATELET # BLD AUTO: 276 K/UL (ref 150–350)
PMV BLD AUTO: 9.3 FL (ref 9.2–12.9)
PMV BLD AUTO: 9.6 FL (ref 9.2–12.9)
POCT GLUCOSE: 130 MG/DL (ref 70–110)
POCT GLUCOSE: 147 MG/DL (ref 70–110)
POTASSIUM SERPL-SCNC: 5.1 MMOL/L (ref 3.5–5.1)
RBC # BLD AUTO: 2.42 M/UL (ref 4.6–6.2)
RBC # BLD AUTO: 2.49 M/UL (ref 4.6–6.2)
SODIUM SERPL-SCNC: 139 MMOL/L (ref 136–145)
WBC # BLD AUTO: 5.95 K/UL (ref 3.9–12.7)
WBC # BLD AUTO: 6.24 K/UL (ref 3.9–12.7)

## 2020-09-19 PROCEDURE — C9113 INJ PANTOPRAZOLE SODIUM, VIA: HCPCS | Mod: HCNC | Performed by: NURSE PRACTITIONER

## 2020-09-19 PROCEDURE — 99213 PR OFFICE/OUTPT VISIT, EST, LEVL III, 20-29 MIN: ICD-10-PCS | Mod: HCNC,,, | Performed by: INTERNAL MEDICINE

## 2020-09-19 PROCEDURE — 80048 BASIC METABOLIC PNL TOTAL CA: CPT | Mod: HCNC

## 2020-09-19 PROCEDURE — 25000003 PHARM REV CODE 250: Mod: HCNC | Performed by: NURSE PRACTITIONER

## 2020-09-19 PROCEDURE — 11000001 HC ACUTE MED/SURG PRIVATE ROOM: Mod: HCNC

## 2020-09-19 PROCEDURE — G0378 HOSPITAL OBSERVATION PER HR: HCPCS | Mod: HCNC

## 2020-09-19 PROCEDURE — 96376 TX/PRO/DX INJ SAME DRUG ADON: CPT

## 2020-09-19 PROCEDURE — 99213 OFFICE O/P EST LOW 20 MIN: CPT | Mod: HCNC,,, | Performed by: INTERNAL MEDICINE

## 2020-09-19 PROCEDURE — 85025 COMPLETE CBC W/AUTO DIFF WBC: CPT | Mod: 91,HCNC

## 2020-09-19 PROCEDURE — 36415 COLL VENOUS BLD VENIPUNCTURE: CPT | Mod: HCNC

## 2020-09-19 PROCEDURE — 63600175 PHARM REV CODE 636 W HCPCS: Mod: HCNC | Performed by: NURSE PRACTITIONER

## 2020-09-19 RX ADMIN — ATORVASTATIN CALCIUM 80 MG: 40 TABLET, FILM COATED ORAL at 08:09

## 2020-09-19 RX ADMIN — CLONAZEPAM 1 MG: 1 TABLET ORAL at 08:09

## 2020-09-19 RX ADMIN — PANTOPRAZOLE SODIUM 40 MG: 40 INJECTION, POWDER, LYOPHILIZED, FOR SOLUTION INTRAVENOUS at 09:09

## 2020-09-19 NOTE — SUBJECTIVE & OBJECTIVE
Review of Systems   Constitutional: Negative.  Negative for activity change, appetite change, fatigue and fever.   HENT: Negative.    Eyes: Negative.    Respiratory: Negative.    Cardiovascular: Negative.    Gastrointestinal: Positive for blood in stool. Negative for abdominal pain, diarrhea, nausea and vomiting.   Endocrine: Negative.    Genitourinary: Negative.  Negative for dysuria, frequency, hematuria and urgency.   Musculoskeletal: Negative.    Skin: Negative.    Allergic/Immunologic: Negative.    Neurological: Positive for weakness. Negative for dizziness and headaches.   Hematological: Negative.    Psychiatric/Behavioral: Negative.      Objective:     Vital Signs (Most Recent):  Temp: 98.3 °F (36.8 °C) (09/19/20 0809)  Pulse: 69 (09/19/20 0809)  Resp: 17 (09/19/20 0809)  BP: 124/60 (09/19/20 0809)  SpO2: 98 % (09/19/20 0809) Vital Signs (24h Range):  Temp:  [97.8 °F (36.6 °C)-98.8 °F (37.1 °C)] 98.3 °F (36.8 °C)  Pulse:  [60-70] 69  Resp:  [17-19] 17  SpO2:  [98 %-100 %] 98 %  BP: (111-156)/(55-73) 124/60     Weight: 97.6 kg (215 lb 2.7 oz)  Body mass index is 31.77 kg/m².    Intake/Output Summary (Last 24 hours) at 9/19/2020 1040  Last data filed at 9/18/2020 1735  Gross per 24 hour   Intake 1074.58 ml   Output --   Net 1074.58 ml      Physical Exam  Vitals signs and nursing note reviewed.   Constitutional:       Appearance: Normal appearance. He is well-developed. He is obese.   HENT:      Head: Normocephalic and atraumatic.   Eyes:      Pupils: Pupils are equal, round, and reactive to light.   Neck:      Musculoskeletal: Normal range of motion and neck supple.   Cardiovascular:      Rate and Rhythm: Normal rate and regular rhythm.      Pulses: Normal pulses.      Heart sounds: Normal heart sounds.   Pulmonary:      Effort: Pulmonary effort is normal. No tachypnea, accessory muscle usage or respiratory distress.      Breath sounds: Normal breath sounds.   Abdominal:      General: Bowel sounds are normal.       Palpations: Abdomen is soft.      Tenderness: There is no abdominal tenderness.   Musculoskeletal: Normal range of motion.   Skin:     General: Skin is warm and dry.      Capillary Refill: Capillary refill takes less than 2 seconds.   Neurological:      Mental Status: He is alert and oriented to person, place, and time.      Deep Tendon Reflexes: Reflexes are normal and symmetric.   Psychiatric:         Speech: Speech normal.         Behavior: Behavior normal.         Thought Content: Thought content normal.         Judgment: Judgment normal.         Significant Labs:   BMP:   Recent Labs   Lab 09/19/20  0426         K 5.1      CO2 22*   BUN 37*   CREATININE 1.6*   CALCIUM 8.4*     CBC:   Recent Labs   Lab 09/18/20  1409 09/19/20  0426 09/19/20  0745   WBC 5.33 6.24 5.95   HGB 6.3* 7.1* 7.4*   HCT 20.6* 22.4* 23.3*    270 276     CMP:   Recent Labs   Lab 09/17/20  1353 09/18/20  0922 09/19/20  0426    138 139   K 4.5 4.7 5.1    108 108   CO2 22* 21* 22*   * 131* 109   BUN 45* 40* 37*   CREATININE 2.3* 1.9* 1.6*   CALCIUM 8.7 8.7 8.4*   PROT 6.9 6.7  --    ALBUMIN 3.8 3.7  --    BILITOT 0.2 0.2  --    ALKPHOS 50* 47*  --    AST 11 11  --    ALT 8* 7*  --    ANIONGAP 11 9 9   EGFRNONAA 26.2* 33* 41*     All pertinent labs within the past 24 hours have been reviewed.    Significant Imaging:   Imaging Results          X-Ray Chest 1 View (Final result)  Result time 09/18/20 12:25:24    Final result by Josh Stokes MD (09/18/20 12:25:24)                 Impression:      No acute abnormality.      Electronically signed by: Josh Stokes  Date:    09/18/2020  Time:    12:25             Narrative:    EXAMINATION:  XR CHEST 1 VIEW    CLINICAL HISTORY:  r/o congestion;.    TECHNIQUE:  Single frontal portable view of the chest was performed.    COMPARISON:  03/20/2019    FINDINGS:  Support devices: Left chest triple lead AICD.  Median sternotomy wires intact.    Mild  interstitial coarsening.The lungs are clear, with normal appearance of pulmonary vasculature and no pleural effusion or pneumothorax.    The cardiac silhouette is normal in size. The hilar and mediastinal contours are unremarkable.    Bones are intact.

## 2020-09-19 NOTE — PLAN OF CARE
Informed patient of observation status , advised patient status may change per provider if needed . Patient understand ,signed , and received written notification . Informed patient of financial services contact number [phone] if needed. No further action taken

## 2020-09-19 NOTE — CONSULTS
[facility] Consultation Note    Reason for Consult:  The primary encounter diagnosis was Rectal bleeding. Diagnoses of Lower GI bleed and Acute blood loss anemia were also pertinent to this visit.    PCP: Chanda Brush   No address on file    HPI:  This is a 80 y.o. male consulted for maroon stools.    Mr. Ortiz is a 80 year old male with PMHx of Afib, CHF, CAD s/p CABG, DM, HLD, HTN.  He presented with shortness of breath and fatigue for the last 3 days and had maroon colored stools.     He denied any nausea, emesis, loss of consciousness, chest pain, hematemesis, abdominal pain, change in appetite. He had prior EGD/Colonoscopy at [facility] 3 years ago.  Denied any NSAID use. Last dose of Plavix and Eliquis was last night.     ROS:  CONSTITUTIONAL: Denies weight change,  fatigue, fevers, chills, night sweats.  EYES: No changes in vision.   ENT: No oral lesions or sore throat.  HEMATOLOGICAL/Lymph: Denies bleeding tendency, bruising tendency. No swellings or enlarged lymph nodes.  CARDIOVASCULAR: Denies chest pain, shortness of breath, orthopnea and edema.  RESPIRATORY: Denies cough, hemoptysis, dyspnea, and wheezing.  GI: See HPI.  : Denies dysuria and hematuria  MUSCULOSKELETAL: Denies joint pain or swelling, back pain and muscle pain.  SKIN: Denies rashes.  NEUROLOGIC: Denies headaches, seizures and numbness.  PSYCHIATRIC: Denies depression or anxiety.  ENDOCRINE: Denies heat or cold intolerance and excessive thirst or urination.    Medical History:   Past Medical History:   Diagnosis Date    Abnormal PFT     Anemia     Arthritis     Atrial fibrillation 10/19/2017    Back pain     Congestive heart failure 3/5/2018    Coronary artery disease     Diabetes mellitus 01/2018     am 02/27/2018    DM (diabetes mellitus) 2018     am 06/23/2020    Hyperlipemia     Hypertension     Myocardial infarction     Obesity     NASREEN (obstructive sleep apnea) 6/5/2018    Prostate cancer  2015    Tobacco dependence     Type 2 diabetes mellitus        Surgical History:  Past Surgical History:   Procedure Laterality Date    CORONARY ARTERY BYPASS GRAFT  1987    EPIDURAL STEROID INJECTION N/A 2019    Procedure: Lumbar L5/S1 IL XUAN;  Surgeon: Tacho Trivedi MD;  Location: [facility];  Service: Pain Management;  Laterality: N/A;    EPIDURAL STEROID INJECTION N/A 2020    Procedure: Lumbar L5/S1 IL XUAN;  Surgeon: Tacho Trivedi MD;  Location: [facility];  Service: Pain Management;  Laterality: N/A;    EPIDURAL STEROID INJECTION N/A 2/10/2020    Procedure: Caudal XUAN;  Surgeon: Tacho Trivedi MD;  Location: [facility];  Service: Pain Management;  Laterality: N/A;    PROSTATE SURGERY      prostate radiation    SPINE SURGERY      fusion    TONSILLECTOMY         Family History:   Family History   Problem Relation Age of Onset    Heart attack Mother     Diabetes Mother     Heart disease Mother     Cataracts Mother     Stroke Father     Heart disease Father     Heart disease Brother        Social History:   Social History     Tobacco Use    Smoking status: Former Smoker     Packs/day: 1.50     Years: 20.00     Pack years: 30.00     Types: Cigarettes     Start date:      Quit date:      Years since quittin.7    Smokeless tobacco: Never Used   Substance Use Topics    Alcohol use: No     Frequency: Never     Binge frequency: Never    Drug use: No       Allergies: Reviewed    Home Medications:   Current Discharge Medication List      CONTINUE these medications which have NOT CHANGED    Details   acetaminophen (TYLENOL) 500 MG tablet Take 500 mg by mouth every 6 (six) hours as needed for Pain.      apixaban (ELIQUIS) 5 mg Tab Take 1 tablet (5 mg total) by mouth 2 (two) times daily.  Qty: 60 tablet, Refills: 3    Associated Diagnoses: PAF (paroxysmal atrial fibrillation)      aspirin (ECOTRIN) 81 MG EC tablet Take 81 mg by mouth.      atorvastatin (LIPITOR)  80 MG tablet TK 1 T PO D  Refills: 3      cholecalciferol, vitamin D3, (VITAMIN D3) 1,000 unit capsule Take 5,000 Units by mouth once daily.      clonazePAM (KLONOPIN) 1 MG tablet Take 1 tablet (1 mg total) by mouth every evening.  Qty: 30 tablet, Refills: 0    Associated Diagnoses: Periodic limb movement disorder (PLMD)      clopidogrel (PLAVIX) 75 mg tablet Take 75 mg by mouth once daily.      ENTRESTO 24-26 mg per tablet       fish oil-omega-3 fatty acids 300-1,000 mg capsule Take 1 capsule by mouth once daily.      furosemide (LASIX) 40 MG tablet Take 1 tablet (40 mg total) by mouth once daily. Take extra dose as needed for swelling or weight gain 3 lbs  Qty: 180 tablet, Refills: 1    Associated Diagnoses: Chronic combined systolic and diastolic congestive heart failure      gabapentin (NEURONTIN) 300 MG capsule Take 2 capsules (600 mg total) by mouth every evening. Can take 3 caps qhs or tid if tolerated, may cause drowsiness  Qty: 180 capsule, Refills: 0      levocetirizine (XYZAL) 5 MG tablet Take 1 tablet (5 mg total) by mouth every evening.  Qty: 90 tablet, Refills: 1    Associated Diagnoses: Allergic rhinitis, unspecified seasonality, unspecified trigger      multivitamin capsule Take 1 capsule by mouth once daily.      blood sugar diagnostic Strp Check blood glucose levels daily in the AM fasting and 1-2 times more daily.  Qty: 300 strip, Refills: 3    Associated Diagnoses: Diabetes mellitus type 2 in obese      blood-glucose meter kit Use as instructed  Qty: 1 each, Refills: 0    Associated Diagnoses: Diabetes mellitus type 2 in obese      fluticasone propionate (FLONASE) 50 mcg/actuation nasal spray 2 sprays (100 mcg total) by Each Nostril route once daily.  Qty: 48 g, Refills: 1    Associated Diagnoses: Allergic rhinitis, unspecified seasonality, unspecified trigger      lancets Misc Check blood glucose levels daily in the AM fasting and 1-2 times more daily.  Qty: 300 each, Refills: 3    Associated  "Diagnoses: Diabetes mellitus type 2 in obese      nitroGLYCERIN (NITROSTAT) 0.3 MG SL tablet PLACE 1 TABLET UNDER THE TONGUE EVERY 5 MINUTES AS NEEDED FOR CHEST PAIN AS DIRECTED  Refills: 0         STOP taking these medications       spironolactone (ALDACTONE) 25 MG tablet Comments:   Reason for Stopping:                 Physical Exam:  Vital Signs:  /63 (Patient Position: Lying)   Pulse 62   Temp 97.8 °F (36.6 °C) (Oral)   Resp 19   Ht 5' 9" (1.753 m)   Wt 97.6 kg (215 lb 2.7 oz)   SpO2 99%   BMI 31.77 kg/m²   Body mass index is 31.77 kg/m².      GENERAL: No acute distress, A&Ox3  EYES: Anicteric, no pallor noted.  ENT: OP clear  NECK: Supple, no masses, no thyromegally.  CHEST: Equal breath sounds bilaterally, no wheezing.  CARDIOVASCULAR: Regular rate and rhythm. Murmurs, rubs and gallops absent.  ABDOMEN: soft, non-tender, non-distended, normal bowel sounds, no hepatosplenomegaly   EXTREMITIES: No clubbing, cyanosis or edema.  SKIN: Without lesion or erythema.  LYMPH: No cervical, axillary lymphadenopathy palpable.   NEUROLOGICAL: Grossly normal, no asterixis present.    Labs: Pertinent labs reviewed.    Assessment and Plan:  Rectal bleeding    Lower GI bleed    Acute blood loss anemia    Other orders  -     CBC auto differential; Standing  -     Comprehensive metabolic panel; Standing  -     Saline lock IV; Standing  -     Urinalysis, Reflex to Urine Culture Urine, Clean Catch; Standing  -     APTT; Standing  -     Protime-INR; Standing  -     Type & Screen; Standing  -     Orthostatic blood pressure; Standing  -     Weigh patient; Standing  -     COVID-19 Rapid Screening; Standing  -     Possible Hospitalization; Standing  -     Place in Observation; Standing  -     Critical Care; Standing  -     Inpatient consult to Gastroenterology; Standing  -     Prepare RBC 2 Units; severe anemia; Standing  -     Transfuse RBC 2 Units; Standing  -     0.9%  NaCl infusion (for blood administration)  -     " clonazePAM tablet 1 mg  -     Cardiac Monitoring - Adult; Standing  -     Vital signs; Standing  -     Orthostatic blood pressure With vital signs; Standing  -     Progressive Mobility Protocol (mobilize patient to their highest level of functioning at least twice daily); Standing  -     Check Type and Screen complete; Standing  -     Large Bore IV Access; Standing  -     Cancel: Diet NPO Except for: Sips with Medication; Standing  -     IP VTE HIGH RISK PATIENT; Standing  -     Place sequential compression device; Standing  -     CBC auto differential; Standing  -     CBC auto differential; Standing  -     Check consent to blood transfusion; Standing  -     Full code; Standing  -     hydrALAZINE injection 10 mg  -     pantoprazole injection 40 mg  -     ondansetron injection 4 mg  -     X-Ray Chest 1 View; Standing  -     atorvastatin tablet 80 mg  -     Basic metabolic panel; Standing  -     albuterol-ipratropium 2.5 mg-0.5 mg/3 mL nebulizer solution 3 mL  -     Inhalation Treatment Q6H PRN; Standing  -     POCT glucose; Standing  -     If any glucose result is less than 50 or greater than 400:; Standing  -     If 2nd result is less than 50 or greater than 400:; Standing  -     dextrose 50% injection 12.5 g  -     glucagon (human recombinant) injection 1 mg  -     Re-check Blood Glucose; Standing  -     insulin aspart U-100 pen 0-5 Units  -     influenza (QUADRIVALENT ADJUVANTED PF) vaccine 0.5 mL  -     Diet diabetic [facility]; 1500 Calorie; Cardiac (Low Na/Chol); Standing  -     POCT glucose; Standing        Mr. Ortiz is a 80 year old male admitted with maroon stools and found to be anemic with a hemoglobin of 5.1 and hematocrit of 16.9 with a baseline hemoglobin of 11-13 in the past. Concern for GI bleeding with differential diagnosis including PUD, AVMs, mass. Currently hemodynamically stable after resuscitation.     Currently receiving PRBCs and last dose of Eliquis and Plavix being yesterday. EGD and  colonoscopy will be needed to evaluate further and given recent anticoagulation to decrease the risk of bleeding from the procedures will plan for the procedures in 5 days. Unless he is hemodynamically unstable or he has unstable bleeding then EGD can be performed emergently.    Will follow and plan for the procedures on 9/22/2020.  He will need a clear liquid diet day before and to start colyte for bowel preparation. Please continue to hold his anti-coagulation and anti-platelet therapy.   Continue Protonix 40 mg BID for now and he can a clear diet.    Javier Farmer MD  Gastroenterology

## 2020-09-19 NOTE — PLAN OF CARE
Pt had no adverse events during shift. Pt free of falls. Call light in reach. Side rails x 2. No bm reported this shift. Pt repositions independently. VTE prophylaxis- . NADN, VSS. Safety promoted. Chart reviewed, will continue to monitor.

## 2020-09-19 NOTE — ASSESSMENT & PLAN NOTE
GI consult   Transfuse 2 units of PRBCs   Serial CBC   Protonix BID   9/19/20  -x1 episode of bleeding this morning   -H/H 7.4/23.3  - Case discussed with Dr. Farmer, will plan for EGD and colonoscopy in 5 days to decrease risk of bleeding due to recent anticoagulation.

## 2020-09-19 NOTE — ASSESSMENT & PLAN NOTE
Likely due to hypoperfusion   Creatinine 1.9 (baseline 1.5)  Avoid nephrotoxic meds   Hold diuretics   Daily BMP   9/19/20  -Renal function improving

## 2020-09-19 NOTE — HOSPITAL COURSE
79 y/o male admitted for lower GI bleed and acute blood loss anemia. Initial H/H of 5.1/16.9.  Patient was transfused 2 units of PRBCs. GI consulted. Will hold anticoagulations. Today H/H 7.4/23.3. Case  discussed with Dr. Farmer, will need  EGD and colonoscopy to evaluate further and given recent anticoagulation to decrease the risk of bleeding from the procedures will plan for the procedures in 5 days. As of 9/20 pt had a bowel movement with red blood yesterday morning, no further episodes reported. This morning H/H 6.7/21.5, will transfuse 2 units of PRBCs. Plans for EGD/colonoscopy on Tuesday. As of 9/21 no active bleeding noted. H/H 9.0/28.0. Continue to monitor. Plan for procedure tomorrow. 09/22: Patient had EGD that showed gastritis which was biopsied. Discussed with GI who recommends dc home today, restart anti coagulation tomorrow, and f/u OP for video capsule study. GI will arrange OP f/u and call patient. New Rx for Pantoprazole 40mg daily sent to pharmacy. Discussed all with patient and spouse, who verbalized understanding.

## 2020-09-19 NOTE — PROGRESS NOTES
[facility] Hospital Medicine  Progress Note    Patient Name: Jake Ortiz  MRN: [id number]  Patient Class: OP- Observation   Admission Date: 9/18/2020  Length of Stay: 0 days  Attending Physician: Henry Vegas, *  Primary Care Provider: Chanda Brush MD        Subjective:     Principal Problem:Lower GI bleed        HPI:   Jake Ortiz is a 80 y.o. male patient with a PMHx of Afib, CHF, CAD, DM, HLD, HTN, and MI who presents to the Emergency Department for evaluation of rectal bleeding which onset gradually 3-4 days ago. Reports maroon-colored blood in stool. Symptoms are constant and moderate in severity. No mitigating or exacerbating factors reported. Associated sxs include generalized weakness. Patient denies any fever, chills, abdominal pain, n/v, constipation, dizziness, lightheadedness, CP, SOB, and all other sxs at this time. ED workup showed: H/H 5.1/16.9, BUN 40, Creatinine 1.9. GI was consulted in the ED. Patient placed in observation for GI bleed.     Overview/Hospital Course:  81 y/o male admitted for lower GI bleed and acute blood loss anemia. Initial H/H of 5.1/16.9.  Patient was transfused 2 units of PRBCs. GI consulted. Will hold anticoagulations. Today H/H 7.4/23.3. Case discussed with Dr. Farmer, will need  EGD and colonoscopy to evaluate further and given recent anticoagulation to decrease the risk of bleeding from the procedures will plan for the procedures in 5 days.       Review of Systems   Constitutional: Negative.  Negative for activity change, appetite change, fatigue and fever.   HENT: Negative.    Eyes: Negative.    Respiratory: Negative.    Cardiovascular: Negative.    Gastrointestinal: Positive for blood in stool. Negative for abdominal pain, diarrhea, nausea and vomiting.   Endocrine: Negative.    Genitourinary: Negative.  Negative for dysuria, frequency, hematuria and urgency.   Musculoskeletal: Negative.    Skin: Negative.     Allergic/Immunologic: Negative.    Neurological: Positive for weakness. Negative for dizziness and headaches.   Hematological: Negative.    Psychiatric/Behavioral: Negative.      Objective:     Vital Signs (Most Recent):  Temp: 98.3 °F (36.8 °C) (09/19/20 0809)  Pulse: 69 (09/19/20 0809)  Resp: 17 (09/19/20 0809)  BP: 124/60 (09/19/20 0809)  SpO2: 98 % (09/19/20 0809) Vital Signs (24h Range):  Temp:  [97.8 °F (36.6 °C)-98.8 °F (37.1 °C)] 98.3 °F (36.8 °C)  Pulse:  [60-70] 69  Resp:  [17-19] 17  SpO2:  [98 %-100 %] 98 %  BP: (111-156)/(55-73) 124/60     Weight: 97.6 kg (215 lb 2.7 oz)  Body mass index is 31.77 kg/m².    Intake/Output Summary (Last 24 hours) at 9/19/2020 1040  Last data filed at 9/18/2020 1735  Gross per 24 hour   Intake 1074.58 ml   Output --   Net 1074.58 ml      Physical Exam  Vitals signs and nursing note reviewed.   Constitutional:       Appearance: Normal appearance. He is well-developed. He is obese.   HENT:      Head: Normocephalic and atraumatic.   Eyes:      Pupils: Pupils are equal, round, and reactive to light.   Neck:      Musculoskeletal: Normal range of motion and neck supple.   Cardiovascular:      Rate and Rhythm: Normal rate and regular rhythm.      Pulses: Normal pulses.      Heart sounds: Normal heart sounds.   Pulmonary:      Effort: Pulmonary effort is normal. No tachypnea, accessory muscle usage or respiratory distress.      Breath sounds: Normal breath sounds.   Abdominal:      General: Bowel sounds are normal.      Palpations: Abdomen is soft.      Tenderness: There is no abdominal tenderness.   Musculoskeletal: Normal range of motion.   Skin:     General: Skin is warm and dry.      Capillary Refill: Capillary refill takes less than 2 seconds.   Neurological:      Mental Status: He is alert and oriented to person, place, and time.      Deep Tendon Reflexes: Reflexes are normal and symmetric.   Psychiatric:         Speech: Speech normal.         Behavior: Behavior normal.          Thought Content: Thought content normal.         Judgment: Judgment normal.         Significant Labs:   BMP:   Recent Labs   Lab 09/19/20  0426         K 5.1      CO2 22*   BUN 37*   CREATININE 1.6*   CALCIUM 8.4*     CBC:   Recent Labs   Lab 09/18/20  1409 09/19/20  0426 09/19/20  0745   WBC 5.33 6.24 5.95   HGB 6.3* 7.1* 7.4*   HCT 20.6* 22.4* 23.3*    270 276     CMP:   Recent Labs   Lab 09/17/20  1353 09/18/20  0922 09/19/20  0426    138 139   K 4.5 4.7 5.1    108 108   CO2 22* 21* 22*   * 131* 109   BUN 45* 40* 37*   CREATININE 2.3* 1.9* 1.6*   CALCIUM 8.7 8.7 8.4*   PROT 6.9 6.7  --    ALBUMIN 3.8 3.7  --    BILITOT 0.2 0.2  --    ALKPHOS 50* 47*  --    AST 11 11  --    ALT 8* 7*  --    ANIONGAP 11 9 9   EGFRNONAA 26.2* 33* 41*     All pertinent labs within the past 24 hours have been reviewed.    Significant Imaging:   Imaging Results          X-Ray Chest 1 View (Final result)  Result time 09/18/20 12:25:24    Final result by Josh Stokes MD (09/18/20 12:25:24)                 Impression:      No acute abnormality.      Electronically signed by: Josh Stokes  Date:    09/18/2020  Time:    12:25             Narrative:    EXAMINATION:  XR CHEST 1 VIEW    CLINICAL HISTORY:  r/o congestion;.    TECHNIQUE:  Single frontal portable view of the chest was performed.    COMPARISON:  03/20/2019    FINDINGS:  Support devices: Left chest triple lead AICD.  Median sternotomy wires intact.    Mild interstitial coarsening.The lungs are clear, with normal appearance of pulmonary vasculature and no pleural effusion or pneumothorax.    The cardiac silhouette is normal in size. The hilar and mediastinal contours are unremarkable.    Bones are intact.                                Assessment/Plan:      * Lower GI bleed  GI consult   Transfuse 2 units of PRBCs   Serial CBC   Protonix BID   9/19/20  -x1 episode of bleeding this morning   -H/H 7.4/23.3  - Case discussed with   Darian, will plan for EGD and colonoscopy in 5 days to decrease risk of bleeding due to recent anticoagulation.        VAIBHAV (acute kidney injury)  Likely due to hypoperfusion   Creatinine 1.9 (baseline 1.5)  Avoid nephrotoxic meds   Hold diuretics   Daily BMP   9/19/20  -Renal function improving         Congestive heart failure  Appears compensated  Will HOLD Entresto due to VAIBHAV   Will hold diuretics at this time    Monitor for volume overload       Atrial fibrillation  Heart rate controlled  Eliquis on hold due to GI bleed       Mixed restrictive and obstructive lung disease  Duoneb tx  Supplemental oxygen       CAD (coronary artery disease)  Stable   ASA, Plavix on hold due to GI bleed   Resume statin       Hyperlipemia  Continue statin       Hypertension  IV hydralazine prn   Bp stable         VTE Risk Mitigation (From admission, onward)         Ordered     IP VTE HIGH RISK PATIENT  Once      09/18/20 1107     Place sequential compression device  Until discontinued      09/18/20 1107                Discharge Planning   LITTLE:      Code Status: Full Code   Is the patient medically ready for discharge?:     Reason for patient still in hospital (select all that apply): Treatment                     Denisa Bhatti NP  Department of Hospital Medicine   [facility]

## 2020-09-19 NOTE — PROGRESS NOTES
[facility] Consultation Note    Reason for Consult:  The primary encounter diagnosis was Rectal bleeding. Diagnoses of Lower GI bleed and Acute blood loss anemia were also pertinent to this visit.    HPI:  Pt had a bowel movement with red blood in the morning. Responded appropriately to blood transfusion. No HD un stability.      Medical History:   Past Medical History:   Diagnosis Date    Abnormal PFT     Anemia     Arthritis     Atrial fibrillation 10/19/2017    Back pain     Congestive heart failure 3/5/2018    Coronary artery disease     Diabetes mellitus 01/2018     am 02/27/2018    DM (diabetes mellitus) 2018     am 06/23/2020    Hyperlipemia     Hypertension     Myocardial infarction     Obesity     NASREEN (obstructive sleep apnea) 6/5/2018    Prostate cancer 2015    Tobacco dependence     Type 2 diabetes mellitus        Surgical History:  Past Surgical History:   Procedure Laterality Date    CORONARY ARTERY BYPASS GRAFT  1987    EPIDURAL STEROID INJECTION N/A 11/22/2019    Procedure: Lumbar L5/S1 IL XUAN;  Surgeon: Tacho Trivedi MD;  Location: [facility];  Service: Pain Management;  Laterality: N/A;    EPIDURAL STEROID INJECTION N/A 1/6/2020    Procedure: Lumbar L5/S1 IL XUAN;  Surgeon: Tacho Trivedi MD;  Location: [facility];  Service: Pain Management;  Laterality: N/A;    EPIDURAL STEROID INJECTION N/A 2/10/2020    Procedure: Caudal XUAN;  Surgeon: Tacho Trivedi MD;  Location: [facility];  Service: Pain Management;  Laterality: N/A;    PROSTATE SURGERY      prostate radiation    SPINE SURGERY      fusion    TONSILLECTOMY         Family History:   Family History   Problem Relation Age of Onset    Heart attack Mother     Diabetes Mother     Heart disease Mother     Cataracts Mother     Stroke Father     Heart disease Father     Heart disease Brother        Social History:   Social History     Tobacco Use    Smoking status: Former  Smoker     Packs/day: 1.50     Years: 20.00     Pack years: 30.00     Types: Cigarettes     Start date:      Quit date:      Years since quittin.7    Smokeless tobacco: Never Used   Substance Use Topics    Alcohol use: No     Frequency: Never     Binge frequency: Never    Drug use: No       Allergies: Reviewed    Home Medications:   Current Discharge Medication List      CONTINUE these medications which have NOT CHANGED    Details   acetaminophen (TYLENOL) 500 MG tablet Take 500 mg by mouth every 6 (six) hours as needed for Pain.      apixaban (ELIQUIS) 5 mg Tab Take 1 tablet (5 mg total) by mouth 2 (two) times daily.  Qty: 60 tablet, Refills: 3    Associated Diagnoses: PAF (paroxysmal atrial fibrillation)      aspirin (ECOTRIN) 81 MG EC tablet Take 81 mg by mouth.      atorvastatin (LIPITOR) 80 MG tablet TK 1 T PO D  Refills: 3      cholecalciferol, vitamin D3, (VITAMIN D3) 1,000 unit capsule Take 5,000 Units by mouth once daily.      clonazePAM (KLONOPIN) 1 MG tablet Take 1 tablet (1 mg total) by mouth every evening.  Qty: 30 tablet, Refills: 0    Associated Diagnoses: Periodic limb movement disorder (PLMD)      clopidogrel (PLAVIX) 75 mg tablet Take 75 mg by mouth once daily.      ENTRESTO 24-26 mg per tablet       fish oil-omega-3 fatty acids 300-1,000 mg capsule Take 1 capsule by mouth once daily.      furosemide (LASIX) 40 MG tablet Take 1 tablet (40 mg total) by mouth once daily. Take extra dose as needed for swelling or weight gain 3 lbs  Qty: 180 tablet, Refills: 1    Associated Diagnoses: Chronic combined systolic and diastolic congestive heart failure      gabapentin (NEURONTIN) 300 MG capsule Take 2 capsules (600 mg total) by mouth every evening. Can take 3 caps qhs or tid if tolerated, may cause drowsiness  Qty: 180 capsule, Refills: 0      levocetirizine (XYZAL) 5 MG tablet Take 1 tablet (5 mg total) by mouth every evening.  Qty: 90 tablet, Refills: 1    Associated Diagnoses: Allergic  "rhinitis, unspecified seasonality, unspecified trigger      multivitamin capsule Take 1 capsule by mouth once daily.      blood sugar diagnostic Strp Check blood glucose levels daily in the AM fasting and 1-2 times more daily.  Qty: 300 strip, Refills: 3    Associated Diagnoses: Diabetes mellitus type 2 in obese      blood-glucose meter kit Use as instructed  Qty: 1 each, Refills: 0    Associated Diagnoses: Diabetes mellitus type 2 in obese      fluticasone propionate (FLONASE) 50 mcg/actuation nasal spray 2 sprays (100 mcg total) by Each Nostril route once daily.  Qty: 48 g, Refills: 1    Associated Diagnoses: Allergic rhinitis, unspecified seasonality, unspecified trigger      lancets Misc Check blood glucose levels daily in the AM fasting and 1-2 times more daily.  Qty: 300 each, Refills: 3    Associated Diagnoses: Diabetes mellitus type 2 in obese      nitroGLYCERIN (NITROSTAT) 0.3 MG SL tablet PLACE 1 TABLET UNDER THE TONGUE EVERY 5 MINUTES AS NEEDED FOR CHEST PAIN AS DIRECTED  Refills: 0         STOP taking these medications       spironolactone (ALDACTONE) 25 MG tablet Comments:   Reason for Stopping:                 Physical Exam:  Vital Signs:  /60 (BP Location: Left arm, Patient Position: Sitting)   Pulse 64   Temp 98 °F (36.7 °C) (Oral)   Resp 18   Ht 5' 9" (1.753 m)   Wt 97.6 kg (215 lb 2.7 oz)   SpO2 98%   BMI 31.77 kg/m²   Body mass index is 31.77 kg/m².      GENERAL: No acute distress, A&Ox3  EYES: Anicteric, no pallor noted.  ENT: OP clear  NECK: Supple, no masses, no thyromegally.  CHEST: Equal breath sounds bilaterally, no wheezing.  CARDIOVASCULAR: Regular rate and rhythm. Murmurs, rubs and gallops absent.  ABDOMEN: soft, non-tender, non-distended, normal bowel sounds, no hepatosplenomegaly   EXTREMITIES: No clubbing, cyanosis or edema.  SKIN: Without lesion or erythema.  LYMPH: No cervical, axillary lymphadenopathy palpable.   NEUROLOGICAL: Grossly normal, no asterixis " present.    Labs: Pertinent labs reviewed.    Assessment and Plan:  Rectal bleeding    Lower GI bleed    Acute blood loss anemia    Other orders  -     CBC auto differential; Standing  -     Comprehensive metabolic panel; Standing  -     Saline lock IV; Standing  -     Urinalysis, Reflex to Urine Culture Urine, Clean Catch; Standing  -     APTT; Standing  -     Protime-INR; Standing  -     Type & Screen; Standing  -     Orthostatic blood pressure; Standing  -     Weigh patient; Standing  -     COVID-19 Rapid Screening; Standing  -     Possible Hospitalization; Standing  -     Place in Observation; Standing  -     Critical Care; Standing  -     Inpatient consult to Gastroenterology; Standing  -     Prepare RBC 2 Units; severe anemia; Standing  -     Transfuse RBC 2 Units; Standing  -     0.9%  NaCl infusion (for blood administration)  -     clonazePAM tablet 1 mg  -     Cardiac Monitoring - Adult; Standing  -     Vital signs; Standing  -     Orthostatic blood pressure With vital signs; Standing  -     Progressive Mobility Protocol (mobilize patient to their highest level of functioning at least twice daily); Standing  -     Check Type and Screen complete; Standing  -     Large Bore IV Access; Standing  -     Cancel: Diet NPO Except for: Sips with Medication; Standing  -     IP VTE HIGH RISK PATIENT; Standing  -     Place sequential compression device; Standing  -     CBC auto differential; Standing  -     CBC auto differential; Standing  -     Check consent to blood transfusion; Standing  -     Full code; Standing  -     hydrALAZINE injection 10 mg  -     pantoprazole injection 40 mg  -     ondansetron injection 4 mg  -     X-Ray Chest 1 View; Standing  -     atorvastatin tablet 80 mg  -     Basic metabolic panel; Standing  -     albuterol-ipratropium 2.5 mg-0.5 mg/3 mL nebulizer solution 3 mL  -     Inhalation Treatment Q6H PRN; Standing  -     POCT glucose; Standing  -     If any glucose result is less than 50 or  greater than 400:; Standing  -     If 2nd result is less than 50 or greater than 400:; Standing  -     dextrose 50% injection 12.5 g  -     glucagon (human recombinant) injection 1 mg  -     Re-check Blood Glucose; Standing  -     insulin aspart U-100 pen 0-5 Units  -     influenza (QUADRIVALENT ADJUVANTED PF) vaccine 0.5 mL  -     Diet diabetic [facility]; 1500 Calorie; Cardiac (Low Na/Chol); Standing  -     POCT glucose; Standing  -     POCT glucose; Standing  -     POCT glucose; Standing  -     POCT glucose; Standing    Mr. Ortiz is a 80 year old male admitted with most likely lower GI bleeding as he has bright red blood with his bowel movements today. Currently is 2 days off of anticoagulation and responded to the 2 units of PRBCs. Will monitor given degree of anemia, Hb of 5 on admission. With plan for EGD/colonoscopy on Tuesday, if stable tomorrow can plan as an outpatient after evaluation.          Javier Farmer MD  Gastroenterology

## 2020-09-20 PROBLEM — D62 ACUTE BLOOD LOSS ANEMIA: Status: ACTIVE | Noted: 2020-09-20

## 2020-09-20 LAB
ANION GAP SERPL CALC-SCNC: 8 MMOL/L (ref 8–16)
BASOPHILS # BLD AUTO: 0.03 K/UL (ref 0–0.2)
BASOPHILS NFR BLD: 0.6 % (ref 0–1.9)
BLD PROD TYP BPU: NORMAL
BLD PROD TYP BPU: NORMAL
BLOOD UNIT EXPIRATION DATE: NORMAL
BLOOD UNIT EXPIRATION DATE: NORMAL
BLOOD UNIT TYPE CODE: 9500
BLOOD UNIT TYPE CODE: 9500
BLOOD UNIT TYPE: NORMAL
BLOOD UNIT TYPE: NORMAL
BUN SERPL-MCNC: 31 MG/DL (ref 8–23)
CALCIUM SERPL-MCNC: 8.6 MG/DL (ref 8.7–10.5)
CHLORIDE SERPL-SCNC: 111 MMOL/L (ref 95–110)
CO2 SERPL-SCNC: 22 MMOL/L (ref 23–29)
CODING SYSTEM: NORMAL
CODING SYSTEM: NORMAL
CREAT SERPL-MCNC: 1.6 MG/DL (ref 0.5–1.4)
DIFFERENTIAL METHOD: ABNORMAL
DISPENSE STATUS: NORMAL
DISPENSE STATUS: NORMAL
EOSINOPHIL # BLD AUTO: 0.2 K/UL (ref 0–0.5)
EOSINOPHIL NFR BLD: 3.4 % (ref 0–8)
ERYTHROCYTE [DISTWIDTH] IN BLOOD BY AUTOMATED COUNT: 15 % (ref 11.5–14.5)
EST. GFR  (AFRICAN AMERICAN): 47 ML/MIN/1.73 M^2
EST. GFR  (NON AFRICAN AMERICAN): 41 ML/MIN/1.73 M^2
GLUCOSE SERPL-MCNC: 117 MG/DL (ref 70–110)
HCT VFR BLD AUTO: 21.5 % (ref 40–54)
HGB BLD-MCNC: 6.7 G/DL (ref 14–18)
IMM GRANULOCYTES # BLD AUTO: 0.01 K/UL (ref 0–0.04)
IMM GRANULOCYTES NFR BLD AUTO: 0.2 % (ref 0–0.5)
LYMPHOCYTES # BLD AUTO: 1 K/UL (ref 1–4.8)
LYMPHOCYTES NFR BLD: 19.6 % (ref 18–48)
MCH RBC QN AUTO: 28.6 PG (ref 27–31)
MCHC RBC AUTO-ENTMCNC: 31.2 G/DL (ref 32–36)
MCV RBC AUTO: 92 FL (ref 82–98)
MONOCYTES # BLD AUTO: 0.5 K/UL (ref 0.3–1)
MONOCYTES NFR BLD: 9.9 % (ref 4–15)
NEUTROPHILS # BLD AUTO: 3.3 K/UL (ref 1.8–7.7)
NEUTROPHILS NFR BLD: 66.3 % (ref 38–73)
NRBC BLD-RTO: 0 /100 WBC
NUM UNITS TRANS PACKED RBC: NORMAL
NUM UNITS TRANS PACKED RBC: NORMAL
PLATELET # BLD AUTO: 269 K/UL (ref 150–350)
PMV BLD AUTO: 9.7 FL (ref 9.2–12.9)
POCT GLUCOSE: 105 MG/DL (ref 70–110)
POCT GLUCOSE: 114 MG/DL (ref 70–110)
POCT GLUCOSE: 119 MG/DL (ref 70–110)
POCT GLUCOSE: 134 MG/DL (ref 70–110)
POTASSIUM SERPL-SCNC: 4.4 MMOL/L (ref 3.5–5.1)
RBC # BLD AUTO: 2.34 M/UL (ref 4.6–6.2)
SODIUM SERPL-SCNC: 141 MMOL/L (ref 136–145)
WBC # BLD AUTO: 4.96 K/UL (ref 3.9–12.7)

## 2020-09-20 PROCEDURE — 96376 TX/PRO/DX INJ SAME DRUG ADON: CPT

## 2020-09-20 PROCEDURE — 63600175 PHARM REV CODE 636 W HCPCS: Mod: HCNC | Performed by: NURSE PRACTITIONER

## 2020-09-20 PROCEDURE — C9113 INJ PANTOPRAZOLE SODIUM, VIA: HCPCS | Mod: HCNC | Performed by: NURSE PRACTITIONER

## 2020-09-20 PROCEDURE — 11000001 HC ACUTE MED/SURG PRIVATE ROOM: Mod: HCNC

## 2020-09-20 PROCEDURE — 21400001 HC TELEMETRY ROOM: Mod: HCNC

## 2020-09-20 PROCEDURE — 25000003 PHARM REV CODE 250: Mod: HCNC | Performed by: NURSE PRACTITIONER

## 2020-09-20 PROCEDURE — P9016 RBC LEUKOCYTES REDUCED: HCPCS | Mod: HCNC

## 2020-09-20 PROCEDURE — 85025 COMPLETE CBC W/AUTO DIFF WBC: CPT | Mod: HCNC

## 2020-09-20 PROCEDURE — 80048 BASIC METABOLIC PNL TOTAL CA: CPT | Mod: HCNC

## 2020-09-20 PROCEDURE — 36415 COLL VENOUS BLD VENIPUNCTURE: CPT | Mod: HCNC

## 2020-09-20 RX ORDER — HYDROCODONE BITARTRATE AND ACETAMINOPHEN 500; 5 MG/1; MG/1
TABLET ORAL
Status: DISCONTINUED | OUTPATIENT
Start: 2020-09-20 | End: 2020-09-22 | Stop reason: HOSPADM

## 2020-09-20 RX ADMIN — CLONAZEPAM 1 MG: 1 TABLET ORAL at 08:09

## 2020-09-20 RX ADMIN — PANTOPRAZOLE SODIUM 40 MG: 40 INJECTION, POWDER, LYOPHILIZED, FOR SOLUTION INTRAVENOUS at 09:09

## 2020-09-20 RX ADMIN — ATORVASTATIN CALCIUM 80 MG: 40 TABLET, FILM COATED ORAL at 08:09

## 2020-09-20 RX ADMIN — PANTOPRAZOLE SODIUM 40 MG: 40 INJECTION, POWDER, LYOPHILIZED, FOR SOLUTION INTRAVENOUS at 08:09

## 2020-09-20 NOTE — NURSING
Second unit of blood checked with Coty. Patient instructed to report possible transfusion reactions. Vital signs taken. Closely monitored.

## 2020-09-20 NOTE — PROGRESS NOTES
Pt had another episode of hematochezia with a decrease in his hemoglobin from 7.4 to 6.7. HD stable. He is getting transfused two more units of PRBCs.     Please start him on clears tomorrow and start colyte prep with plan for EGD/Colonoscopy on Tuesday.    Javier Farmer MD  Gastroenterology

## 2020-09-20 NOTE — SUBJECTIVE & OBJECTIVE
Interval History: Patient sitting in bed watching ipad. Reports feeling good. Denies any further episodes of rectal bleeding. This morning H/H 6.7/21.5, will transfuse 2 units of PRBCs. Plan for EGD/colonoscopy on Tuesday.       Review of Systems   Constitutional: Negative.  Negative for activity change, appetite change, fatigue and fever.   HENT: Negative.    Eyes: Negative.    Respiratory: Negative.    Cardiovascular: Negative.    Gastrointestinal: Positive for blood in stool. Negative for abdominal pain, diarrhea, nausea and vomiting.   Endocrine: Negative.    Genitourinary: Negative.  Negative for dysuria, frequency, hematuria and urgency.   Musculoskeletal: Negative.    Skin: Negative.    Allergic/Immunologic: Negative.    Neurological: Positive for weakness. Negative for dizziness and headaches.   Hematological: Negative.    Psychiatric/Behavioral: Negative.      Objective:     Vital Signs (Most Recent):  Temp: 98.1 °F (36.7 °C) (09/20/20 0736)  Pulse: 65 (09/20/20 0736)  Resp: 15 (09/20/20 0736)  BP: (!) 115/57 (09/20/20 0736)  SpO2: 97 % (09/20/20 0736) Vital Signs (24h Range):  Temp:  [97.7 °F (36.5 °C)-98.4 °F (36.9 °C)] 98.1 °F (36.7 °C)  Pulse:  [61-70] 65  Resp:  [15-18] 15  SpO2:  [97 %-100 %] 97 %  BP: (115-155)/(57-72) 115/57     Weight: 97.6 kg (215 lb 2.7 oz)  Body mass index is 31.77 kg/m².  No intake or output data in the 24 hours ending 09/20/20 0858   Physical Exam  Vitals signs and nursing note reviewed.   Constitutional:       Appearance: Normal appearance. He is well-developed. He is obese.   HENT:      Head: Normocephalic and atraumatic.   Eyes:      Pupils: Pupils are equal, round, and reactive to light.   Neck:      Musculoskeletal: Normal range of motion and neck supple.   Cardiovascular:      Rate and Rhythm: Normal rate and regular rhythm.      Pulses: Normal pulses.      Heart sounds: Normal heart sounds.   Pulmonary:      Effort: Pulmonary effort is normal. No tachypnea, accessory  muscle usage or respiratory distress.      Breath sounds: Normal breath sounds.   Abdominal:      General: Bowel sounds are normal.      Palpations: Abdomen is soft.      Tenderness: There is no abdominal tenderness.   Musculoskeletal: Normal range of motion.   Skin:     General: Skin is warm and dry.      Capillary Refill: Capillary refill takes less than 2 seconds.      Coloration: Skin is pale.   Neurological:      Mental Status: He is alert and oriented to person, place, and time.      Deep Tendon Reflexes: Reflexes are normal and symmetric.   Psychiatric:         Speech: Speech normal.         Behavior: Behavior normal.         Thought Content: Thought content normal.         Judgment: Judgment normal.         Significant Labs:   BMP:   Recent Labs   Lab 09/20/20  0629   *      K 4.4   *   CO2 22*   BUN 31*   CREATININE 1.6*   CALCIUM 8.6*     CBC:   Recent Labs   Lab 09/19/20  0426 09/19/20  0745 09/20/20  0629   WBC 6.24 5.95 4.96   HGB 7.1* 7.4* 6.7*   HCT 22.4* 23.3* 21.5*    276 269     CMP:   Recent Labs   Lab 09/18/20  0922 09/19/20  0426 09/20/20  0629    139 141   K 4.7 5.1 4.4    108 111*   CO2 21* 22* 22*   * 109 117*   BUN 40* 37* 31*   CREATININE 1.9* 1.6* 1.6*   CALCIUM 8.7 8.4* 8.6*   PROT 6.7  --   --    ALBUMIN 3.7  --   --    BILITOT 0.2  --   --    ALKPHOS 47*  --   --    AST 11  --   --    ALT 7*  --   --    ANIONGAP 9 9 8   EGFRNONAA 33* 41* 41*       Significant Imaging: I have reviewed all pertinent imaging results/findings within the past 24 hours.

## 2020-09-20 NOTE — PLAN OF CARE
Patient remained free from injury. Cardiac and blood glucose monitored. No complaints of pain. No s/s of acute distress. 12hr chart check complete.

## 2020-09-20 NOTE — ASSESSMENT & PLAN NOTE
Likely due to hypoperfusion   Creatinine 1.9 (baseline 1.5)  Avoid nephrotoxic meds   Hold diuretics   Daily BMP   9/20/20  -Renal function stable

## 2020-09-20 NOTE — PLAN OF CARE
Care plan reviewed with patient. Able to walk in the hallway unassisted. No episode of bleeding. Transfusion tolerated.. Free from falls. No complaints made.

## 2020-09-20 NOTE — PROGRESS NOTES
[facility] Hospital Medicine  Progress Note    Patient Name: Jake Ortiz  MRN: [id number]  Patient Class: OP- Observation   Admission Date: 9/18/2020  Length of Stay: 0 days  Attending Physician: Henry Vegas, *  Primary Care Provider: Chanda Brush MD        Subjective:     Principal Problem:Lower GI bleed        HPI:   Jake Ortiz is a 80 y.o. male patient with a PMHx of Afib, CHF, CAD, DM, HLD, HTN, and MI who presents to the Emergency Department for evaluation of rectal bleeding which onset gradually 3-4 days ago. Reports maroon-colored blood in stool. Symptoms are constant and moderate in severity. No mitigating or exacerbating factors reported. Associated sxs include generalized weakness. Patient denies any fever, chills, abdominal pain, n/v, constipation, dizziness, lightheadedness, CP, SOB, and all other sxs at this time. ED workup showed: H/H 5.1/16.9, BUN 40, Creatinine 1.9. GI was consulted in the ED. Patient placed in observation for GI bleed.     Overview/Hospital Course:  81 y/o male admitted for lower GI bleed and acute blood loss anemia. Initial H/H of 5.1/16.9.  Patient was transfused 2 units of PRBCs. GI consulted. Will hold anticoagulations. Today H/H 7.4/23.3. Case discussed with Dr. Farmer, will need  EGD and colonoscopy to evaluate further and given recent anticoagulation to decrease the risk of bleeding from the procedures will plan for the procedures in 5 days. As of 9/20 pt had a bowel movement with red blood yesterday morning, no further episodes reported. This morning H/H 6.7/21.5, will transfuse 2 units of PRBCs. Plans for EGD/colonoscopy on Tuesday.     Interval History: Patient sitting in bed watching ipad. Reports feeling good. Denies any further episodes of rectal bleeding. This morning H/H 6.7/21.5, will transfuse 2 units of PRBCs. Plan for EGD/colonoscopy on Tuesday.       Review of Systems   Constitutional: Negative.  Negative for activity  change, appetite change, fatigue and fever.   HENT: Negative.    Eyes: Negative.    Respiratory: Negative.    Cardiovascular: Negative.    Gastrointestinal: Positive for blood in stool. Negative for abdominal pain, diarrhea, nausea and vomiting.   Endocrine: Negative.    Genitourinary: Negative.  Negative for dysuria, frequency, hematuria and urgency.   Musculoskeletal: Negative.    Skin: Negative.    Allergic/Immunologic: Negative.    Neurological: Positive for weakness. Negative for dizziness and headaches.   Hematological: Negative.    Psychiatric/Behavioral: Negative.      Objective:     Vital Signs (Most Recent):  Temp: 98.1 °F (36.7 °C) (09/20/20 0736)  Pulse: 65 (09/20/20 0736)  Resp: 15 (09/20/20 0736)  BP: (!) 115/57 (09/20/20 0736)  SpO2: 97 % (09/20/20 0736) Vital Signs (24h Range):  Temp:  [97.7 °F (36.5 °C)-98.4 °F (36.9 °C)] 98.1 °F (36.7 °C)  Pulse:  [61-70] 65  Resp:  [15-18] 15  SpO2:  [97 %-100 %] 97 %  BP: (115-155)/(57-72) 115/57     Weight: 97.6 kg (215 lb 2.7 oz)  Body mass index is 31.77 kg/m².  No intake or output data in the 24 hours ending 09/20/20 0858   Physical Exam  Vitals signs and nursing note reviewed.   Constitutional:       Appearance: Normal appearance. He is well-developed. He is obese.   HENT:      Head: Normocephalic and atraumatic.   Eyes:      Pupils: Pupils are equal, round, and reactive to light.   Neck:      Musculoskeletal: Normal range of motion and neck supple.   Cardiovascular:      Rate and Rhythm: Normal rate and regular rhythm.      Pulses: Normal pulses.      Heart sounds: Normal heart sounds.   Pulmonary:      Effort: Pulmonary effort is normal. No tachypnea, accessory muscle usage or respiratory distress.      Breath sounds: Normal breath sounds.   Abdominal:      General: Bowel sounds are normal.      Palpations: Abdomen is soft.      Tenderness: There is no abdominal tenderness.   Musculoskeletal: Normal range of motion.   Skin:     General: Skin is warm and  dry.      Capillary Refill: Capillary refill takes less than 2 seconds.      Coloration: Skin is pale.   Neurological:      Mental Status: He is alert and oriented to person, place, and time.      Deep Tendon Reflexes: Reflexes are normal and symmetric.   Psychiatric:         Speech: Speech normal.         Behavior: Behavior normal.         Thought Content: Thought content normal.         Judgment: Judgment normal.         Significant Labs:   BMP:   Recent Labs   Lab 09/20/20  0629   *      K 4.4   *   CO2 22*   BUN 31*   CREATININE 1.6*   CALCIUM 8.6*     CBC:   Recent Labs   Lab 09/19/20  0426 09/19/20  0745 09/20/20  0629   WBC 6.24 5.95 4.96   HGB 7.1* 7.4* 6.7*   HCT 22.4* 23.3* 21.5*    276 269     CMP:   Recent Labs   Lab 09/18/20  0922 09/19/20  0426 09/20/20  0629    139 141   K 4.7 5.1 4.4    108 111*   CO2 21* 22* 22*   * 109 117*   BUN 40* 37* 31*   CREATININE 1.9* 1.6* 1.6*   CALCIUM 8.7 8.4* 8.6*   PROT 6.7  --   --    ALBUMIN 3.7  --   --    BILITOT 0.2  --   --    ALKPHOS 47*  --   --    AST 11  --   --    ALT 7*  --   --    ANIONGAP 9 9 8   EGFRNONAA 33* 41* 41*       Significant Imaging: I have reviewed all pertinent imaging results/findings within the past 24 hours.      Assessment/Plan:      * Lower GI bleed  GI consult   Transfuse 2 units of PRBCs   Serial CBC   Protonix BID   9/19/20  -x1 episode of bleeding this morning   -H/H 7.4/23.3  - Case discussed with Dr. Farmer, will plan for EGD and colonoscopy in 5 days to decrease risk of bleeding due to recent anticoagulation.    9/20/20  -No further episodes   -H/H 6.7/21.5, will transfuse 2 units of PRBCs.   -Plan for EGD/colonoscopy on Tuesday.         Acute blood loss anemia  2/2 GI bleed   Initial Hemoglobin 5.1  Pt received 2 units of PRBCs  H/H improved  9/20/20  -H/H 6.7/21.5  -Will transfuse 2 additional units of PRBCs   -Reports no further episodes of bleeding         VAIBHAV (acute kidney  injury)  Likely due to hypoperfusion   Creatinine 1.9 (baseline 1.5)  Avoid nephrotoxic meds   Hold diuretics   Daily BMP   9/20/20  -Renal function stable            Congestive heart failure  Appears compensated  Will HOLD Entresto due to VAIBHAV   Will hold diuretics at this time    Monitor for volume overload       Atrial fibrillation  Heart rate controlled  Eliquis on hold due to GI bleed       Mixed restrictive and obstructive lung disease  Duoneb tx  Supplemental oxygen       CAD (coronary artery disease)  Stable   ASA, Plavix on hold due to GI bleed   Resume statin       Hyperlipemia  Continue statin       Hypertension  IV hydralazine prn   Bp stable         VTE Risk Mitigation (From admission, onward)         Ordered     IP VTE HIGH RISK PATIENT  Once      09/18/20 1107     Place sequential compression device  Until discontinued      09/18/20 1107                Discharge Planning   LITTLE:      Code Status: Full Code   Is the patient medically ready for discharge?:     Reason for patient still in hospital (select all that apply): Treatment                     Denisa Bhatti NP  Department of Hospital Medicine   [facility]

## 2020-09-20 NOTE — ASSESSMENT & PLAN NOTE
2/2 GI bleed   Initial Hemoglobin 5.1  Pt received 2 units of PRBCs  H/H improved  9/20/20  -H/H 6.7/21.5  -Will transfuse 2 additional units of PRBCs   -Reports no further episodes of bleeding

## 2020-09-20 NOTE — ASSESSMENT & PLAN NOTE
GI consult   Transfuse 2 units of PRBCs   Serial CBC   Protonix BID   9/19/20  -x1 episode of bleeding this morning   -H/H 7.4/23.3  - Case discussed with Dr. Farmer, will plan for EGD and colonoscopy in 5 days to decrease risk of bleeding due to recent anticoagulation.    9/20/20  -No further episodes   -H/H 6.7/21.5, will transfuse 2 units of PRBCs.   -Plan for EGD/colonoscopy on Tuesday.

## 2020-09-20 NOTE — NURSING
First unit of blood checked with Coty. Patient instructed to report possible transfusion reactions. Vital signs taken. Closely monitored.

## 2020-09-21 LAB
ANION GAP SERPL CALC-SCNC: 9 MMOL/L (ref 8–16)
BASOPHILS # BLD AUTO: 0.04 K/UL (ref 0–0.2)
BASOPHILS NFR BLD: 0.7 % (ref 0–1.9)
BUN SERPL-MCNC: 26 MG/DL (ref 8–23)
CALCIUM SERPL-MCNC: 8.5 MG/DL (ref 8.7–10.5)
CHLORIDE SERPL-SCNC: 110 MMOL/L (ref 95–110)
CO2 SERPL-SCNC: 20 MMOL/L (ref 23–29)
CREAT SERPL-MCNC: 1.5 MG/DL (ref 0.5–1.4)
DIFFERENTIAL METHOD: ABNORMAL
EOSINOPHIL # BLD AUTO: 0.2 K/UL (ref 0–0.5)
EOSINOPHIL NFR BLD: 3.7 % (ref 0–8)
ERYTHROCYTE [DISTWIDTH] IN BLOOD BY AUTOMATED COUNT: 15.5 % (ref 11.5–14.5)
EST. GFR  (AFRICAN AMERICAN): 51 ML/MIN/1.73 M^2
EST. GFR  (NON AFRICAN AMERICAN): 44 ML/MIN/1.73 M^2
GLUCOSE SERPL-MCNC: 116 MG/DL (ref 70–110)
HCT VFR BLD AUTO: 28 % (ref 40–54)
HGB BLD-MCNC: 9 G/DL (ref 14–18)
IMM GRANULOCYTES # BLD AUTO: 0.02 K/UL (ref 0–0.04)
IMM GRANULOCYTES NFR BLD AUTO: 0.4 % (ref 0–0.5)
LYMPHOCYTES # BLD AUTO: 0.8 K/UL (ref 1–4.8)
LYMPHOCYTES NFR BLD: 14.9 % (ref 18–48)
MCH RBC QN AUTO: 29.3 PG (ref 27–31)
MCHC RBC AUTO-ENTMCNC: 32.1 G/DL (ref 32–36)
MCV RBC AUTO: 91 FL (ref 82–98)
MONOCYTES # BLD AUTO: 0.5 K/UL (ref 0.3–1)
MONOCYTES NFR BLD: 9.4 % (ref 4–15)
NEUTROPHILS # BLD AUTO: 3.9 K/UL (ref 1.8–7.7)
NEUTROPHILS NFR BLD: 70.9 % (ref 38–73)
NRBC BLD-RTO: 0 /100 WBC
PLATELET # BLD AUTO: 284 K/UL (ref 150–350)
PMV BLD AUTO: 9.5 FL (ref 9.2–12.9)
POCT GLUCOSE: 106 MG/DL (ref 70–110)
POCT GLUCOSE: 112 MG/DL (ref 70–110)
POCT GLUCOSE: 133 MG/DL (ref 70–110)
POTASSIUM SERPL-SCNC: 4 MMOL/L (ref 3.5–5.1)
RBC # BLD AUTO: 3.07 M/UL (ref 4.6–6.2)
SODIUM SERPL-SCNC: 139 MMOL/L (ref 136–145)
WBC # BLD AUTO: 5.43 K/UL (ref 3.9–12.7)

## 2020-09-21 PROCEDURE — 25000003 PHARM REV CODE 250: Mod: HCNC | Performed by: NURSE PRACTITIONER

## 2020-09-21 PROCEDURE — 63600175 PHARM REV CODE 636 W HCPCS: Mod: HCNC | Performed by: NURSE PRACTITIONER

## 2020-09-21 PROCEDURE — 99223 1ST HOSP IP/OBS HIGH 75: CPT | Mod: HCNC,,, | Performed by: INTERNAL MEDICINE

## 2020-09-21 PROCEDURE — 36415 COLL VENOUS BLD VENIPUNCTURE: CPT | Mod: HCNC

## 2020-09-21 PROCEDURE — 21400001 HC TELEMETRY ROOM: Mod: HCNC

## 2020-09-21 PROCEDURE — 99223 PR INITIAL HOSPITAL CARE,LEVL III: ICD-10-PCS | Mod: HCNC,,, | Performed by: INTERNAL MEDICINE

## 2020-09-21 PROCEDURE — 80048 BASIC METABOLIC PNL TOTAL CA: CPT | Mod: HCNC

## 2020-09-21 PROCEDURE — 85025 COMPLETE CBC W/AUTO DIFF WBC: CPT | Mod: HCNC

## 2020-09-21 PROCEDURE — 25000003 PHARM REV CODE 250: Mod: HCNC | Performed by: PHYSICIAN ASSISTANT

## 2020-09-21 PROCEDURE — C9113 INJ PANTOPRAZOLE SODIUM, VIA: HCPCS | Mod: HCNC | Performed by: NURSE PRACTITIONER

## 2020-09-21 RX ORDER — FUROSEMIDE 40 MG/1
40 TABLET ORAL DAILY
Status: DISCONTINUED | OUTPATIENT
Start: 2020-09-21 | End: 2020-09-22 | Stop reason: HOSPADM

## 2020-09-21 RX ORDER — BISACODYL 5 MG
20 TABLET, DELAYED RELEASE (ENTERIC COATED) ORAL ONCE
Status: COMPLETED | OUTPATIENT
Start: 2020-09-21 | End: 2020-09-21

## 2020-09-21 RX ORDER — POLYETHYLENE GLYCOL 3350, SODIUM SULFATE ANHYDROUS, SODIUM BICARBONATE, SODIUM CHLORIDE, POTASSIUM CHLORIDE 236; 22.74; 6.74; 5.86; 2.97 G/4L; G/4L; G/4L; G/4L; G/4L
4000 POWDER, FOR SOLUTION ORAL ONCE
Status: COMPLETED | OUTPATIENT
Start: 2020-09-21 | End: 2020-09-21

## 2020-09-21 RX ADMIN — ATORVASTATIN CALCIUM 80 MG: 40 TABLET, FILM COATED ORAL at 08:09

## 2020-09-21 RX ADMIN — BISACODYL 20 MG: 5 TABLET, COATED ORAL at 11:09

## 2020-09-21 RX ADMIN — PANTOPRAZOLE SODIUM 40 MG: 40 INJECTION, POWDER, LYOPHILIZED, FOR SOLUTION INTRAVENOUS at 08:09

## 2020-09-21 RX ADMIN — POLYETHYLENE GLYCOL 3350, SODIUM SULFATE ANHYDROUS, SODIUM BICARBONATE, SODIUM CHLORIDE, POTASSIUM CHLORIDE 4000 ML: 236; 22.74; 6.74; 5.86; 2.97 POWDER, FOR SOLUTION ORAL at 05:09

## 2020-09-21 RX ADMIN — PANTOPRAZOLE SODIUM 40 MG: 40 INJECTION, POWDER, LYOPHILIZED, FOR SOLUTION INTRAVENOUS at 09:09

## 2020-09-21 RX ADMIN — CLONAZEPAM 1 MG: 1 TABLET ORAL at 08:09

## 2020-09-21 RX ADMIN — FUROSEMIDE 40 MG: 40 TABLET ORAL at 09:09

## 2020-09-21 NOTE — PROGRESS NOTES
Met with patient and discussed plan for EGD/colonoscopy tomorrow. Diet and prep orders placed.   Ke Valdes PA-C

## 2020-09-21 NOTE — ASSESSMENT & PLAN NOTE
2/2 GI bleed   Initial Hemoglobin 5.1  Pt received 2 units of PRBCs  H/H improved  9/20/20  -H/H 6.7/21.5  -Will transfuse 2 additional units of PRBCs   -Reports no further episodes of bleeding   9/21/20  -No active bleeding   -H/H stable   -Transfuse as needed

## 2020-09-21 NOTE — PROGRESS NOTES
[facility] Hospital Medicine  Progress Note    Patient Name: Jake Ortiz  MRN: [id number]  Patient Class: IP- Inpatient   Admission Date: 9/18/2020  Length of Stay: 1 days  Attending Physician: Henry Vegas, *  Primary Care Provider: Chanda Brush MD        Subjective:     Principal Problem:Lower GI bleed        HPI:   Jake Ortiz is a 80 y.o. male patient with a PMHx of Afib, CHF, CAD, DM, HLD, HTN, and MI who presents to the Emergency Department for evaluation of rectal bleeding which onset gradually 3-4 days ago. Reports maroon-colored blood in stool. Symptoms are constant and moderate in severity. No mitigating or exacerbating factors reported. Associated sxs include generalized weakness. Patient denies any fever, chills, abdominal pain, n/v, constipation, dizziness, lightheadedness, CP, SOB, and all other sxs at this time. ED workup showed: H/H 5.1/16.9, BUN 40, Creatinine 1.9. GI was consulted in the ED. Patient placed in observation for GI bleed.     Overview/Hospital Course:  81 y/o male admitted for lower GI bleed and acute blood loss anemia. Initial H/H of 5.1/16.9.  Patient was transfused 2 units of PRBCs. GI consulted. Will hold anticoagulations. Today H/H 7.4/23.3. Case discussed with Dr. Farmer, will need  EGD and colonoscopy to evaluate further and given recent anticoagulation to decrease the risk of bleeding from the procedures will plan for the procedures in 5 days. As of 9/20 pt had a bowel movement with red blood yesterday morning, no further episodes reported. This morning H/H 6.7/21.5, will transfuse 2 units of PRBCs. Plans for EGD/colonoscopy on Tuesday. As of 9/21 no active bleeding noted. H/H 9.0/28.0. Continue to monitor. Plan for procedure tomorrow.     Interval History: Patient doing well, in good spirits. No active bleeding noted. H/H 9.0/28.0. Continue to monitor. Plan for procedure tomorrow.       Review of Systems   Constitutional: Negative.   Negative for activity change, appetite change, fatigue and fever.   HENT: Negative.    Eyes: Negative.    Respiratory: Negative.    Cardiovascular: Negative.    Gastrointestinal: Positive for blood in stool. Negative for abdominal pain, diarrhea, nausea and vomiting.   Endocrine: Negative.    Genitourinary: Negative.  Negative for dysuria, frequency, hematuria and urgency.   Musculoskeletal: Negative.    Skin: Negative.    Allergic/Immunologic: Negative.    Neurological: Positive for weakness. Negative for dizziness and headaches.   Hematological: Negative.    Psychiatric/Behavioral: Negative.      Objective:     Vital Signs (Most Recent):  Temp: 98.3 °F (36.8 °C) (09/21/20 0730)  Pulse: 63 (09/21/20 0730)  Resp: 18 (09/21/20 0730)  BP: (!) 110/58 (09/21/20 0730)  SpO2: 97 % (09/21/20 0730) Vital Signs (24h Range):  Temp:  [97.6 °F (36.4 °C)-98.6 °F (37 °C)] 98.3 °F (36.8 °C)  Pulse:  [60-80] 63  Resp:  [15-18] 18  SpO2:  [96 %-100 %] 97 %  BP: (103-174)/(53-80) 110/58     Weight: 97.6 kg (215 lb 2.7 oz)  Body mass index is 31.77 kg/m².    Intake/Output Summary (Last 24 hours) at 9/21/2020 0929  Last data filed at 9/21/2020 0900  Gross per 24 hour   Intake 1620 ml   Output --   Net 1620 ml      Physical Exam  Vitals signs and nursing note reviewed.   Constitutional:       Appearance: Normal appearance. He is well-developed. He is obese.   HENT:      Head: Normocephalic and atraumatic.   Eyes:      Pupils: Pupils are equal, round, and reactive to light.   Neck:      Musculoskeletal: Normal range of motion and neck supple.   Cardiovascular:      Rate and Rhythm: Normal rate and regular rhythm.      Pulses: Normal pulses.      Heart sounds: Normal heart sounds.   Pulmonary:      Effort: Pulmonary effort is normal. No tachypnea, accessory muscle usage or respiratory distress.      Breath sounds: Normal breath sounds.   Abdominal:      General: Bowel sounds are normal.      Palpations: Abdomen is soft.      Tenderness:  There is no abdominal tenderness.   Musculoskeletal: Normal range of motion.   Skin:     General: Skin is warm and dry.      Capillary Refill: Capillary refill takes less than 2 seconds.   Neurological:      Mental Status: He is alert and oriented to person, place, and time.      Deep Tendon Reflexes: Reflexes are normal and symmetric.   Psychiatric:         Speech: Speech normal.         Behavior: Behavior normal.         Thought Content: Thought content normal.         Judgment: Judgment normal.         Significant Labs:   BMP:   Recent Labs   Lab 09/21/20  0638   *      K 4.0      CO2 20*   BUN 26*   CREATININE 1.5*   CALCIUM 8.5*     CBC:   Recent Labs   Lab 09/20/20  0629 09/21/20  0638   WBC 4.96 5.43   HGB 6.7* 9.0*   HCT 21.5* 28.0*    284     CMP:   Recent Labs   Lab 09/20/20  0629 09/21/20  0638    139   K 4.4 4.0   * 110   CO2 22* 20*   * 116*   BUN 31* 26*   CREATININE 1.6* 1.5*   CALCIUM 8.6* 8.5*   ANIONGAP 8 9   EGFRNONAA 41* 44*       Significant Imaging: I have reviewed all pertinent imaging results/findings within the past 24 hours.      Assessment/Plan:      * Lower GI bleed  GI consult   Transfuse 2 units of PRBCs   Serial CBC   Protonix BID   9/19/20  -x1 episode of bleeding this morning   -H/H 7.4/23.3  - Case discussed with Dr. Farmer, will plan for EGD and colonoscopy in 5 days to decrease risk of bleeding due to recent anticoagulation.    9/20/20  -No further episodes   -H/H 6.7/21.5, will transfuse 2 units of PRBCs.   -Plan for EGD/colonoscopy on Tuesday.   9/21/20  -H/H stable   -Plans for procedure tomorrow         Acute blood loss anemia  2/2 GI bleed   Initial Hemoglobin 5.1  Pt received 2 units of PRBCs  H/H improved  9/20/20  -H/H 6.7/21.5  -Will transfuse 2 additional units of PRBCs   -Reports no further episodes of bleeding   9/21/20  -No active bleeding   -H/H stable   -Transfuse as needed      VAIBHAV (acute kidney injury)  Likely due to  hypoperfusion   Creatinine 1.9 (baseline 1.5)  Avoid nephrotoxic meds   Hold diuretics   Daily BMP   9/21/20  -Renal function at baseline            Congestive heart failure  Appears compensated  Will HOLD Entresto due to VAIBHAV   Resume daily lasix    Monitor for volume overload       Atrial fibrillation  Heart rate controlled  Eliquis on hold due to GI bleed       Mixed restrictive and obstructive lung disease  Duoneb tx  Supplemental oxygen       CAD (coronary artery disease)  Stable   ASA, Plavix on hold due to GI bleed   Resume statin       Hyperlipemia  Continue statin       Hypertension  IV hydralazine prn   Bp stable         VTE Risk Mitigation (From admission, onward)         Ordered     IP VTE HIGH RISK PATIENT  Once      09/18/20 1107     Place sequential compression device  Until discontinued      09/18/20 1107                Discharge Planning   LITTLE:      Code Status: Full Code   Is the patient medically ready for discharge?:     Reason for patient still in hospital (select all that apply): Treatment                     Denisa Bhatti NP  Department of Hospital Medicine   [facility]

## 2020-09-21 NOTE — SUBJECTIVE & OBJECTIVE
Interval History: Patient doing well, in good spirits. No active bleeding noted. H/H 9.0/28.0. Continue to monitor. Plan for procedure tomorrow.       Review of Systems   Constitutional: Negative.  Negative for activity change, appetite change, fatigue and fever.   HENT: Negative.    Eyes: Negative.    Respiratory: Negative.    Cardiovascular: Negative.    Gastrointestinal: Positive for blood in stool. Negative for abdominal pain, diarrhea, nausea and vomiting.   Endocrine: Negative.    Genitourinary: Negative.  Negative for dysuria, frequency, hematuria and urgency.   Musculoskeletal: Negative.    Skin: Negative.    Allergic/Immunologic: Negative.    Neurological: Positive for weakness. Negative for dizziness and headaches.   Hematological: Negative.    Psychiatric/Behavioral: Negative.      Objective:     Vital Signs (Most Recent):  Temp: 98.3 °F (36.8 °C) (09/21/20 0730)  Pulse: 63 (09/21/20 0730)  Resp: 18 (09/21/20 0730)  BP: (!) 110/58 (09/21/20 0730)  SpO2: 97 % (09/21/20 0730) Vital Signs (24h Range):  Temp:  [97.6 °F (36.4 °C)-98.6 °F (37 °C)] 98.3 °F (36.8 °C)  Pulse:  [60-80] 63  Resp:  [15-18] 18  SpO2:  [96 %-100 %] 97 %  BP: (103-174)/(53-80) 110/58     Weight: 97.6 kg (215 lb 2.7 oz)  Body mass index is 31.77 kg/m².    Intake/Output Summary (Last 24 hours) at 9/21/2020 0929  Last data filed at 9/21/2020 0900  Gross per 24 hour   Intake 1620 ml   Output --   Net 1620 ml      Physical Exam  Vitals signs and nursing note reviewed.   Constitutional:       Appearance: Normal appearance. He is well-developed. He is obese.   HENT:      Head: Normocephalic and atraumatic.   Eyes:      Pupils: Pupils are equal, round, and reactive to light.   Neck:      Musculoskeletal: Normal range of motion and neck supple.   Cardiovascular:      Rate and Rhythm: Normal rate and regular rhythm.      Pulses: Normal pulses.      Heart sounds: Normal heart sounds.   Pulmonary:      Effort: Pulmonary effort is normal. No  tachypnea, accessory muscle usage or respiratory distress.      Breath sounds: Normal breath sounds.   Abdominal:      General: Bowel sounds are normal.      Palpations: Abdomen is soft.      Tenderness: There is no abdominal tenderness.   Musculoskeletal: Normal range of motion.   Skin:     General: Skin is warm and dry.      Capillary Refill: Capillary refill takes less than 2 seconds.   Neurological:      Mental Status: He is alert and oriented to person, place, and time.      Deep Tendon Reflexes: Reflexes are normal and symmetric.   Psychiatric:         Speech: Speech normal.         Behavior: Behavior normal.         Thought Content: Thought content normal.         Judgment: Judgment normal.         Significant Labs:   BMP:   Recent Labs   Lab 09/21/20  0638   *      K 4.0      CO2 20*   BUN 26*   CREATININE 1.5*   CALCIUM 8.5*     CBC:   Recent Labs   Lab 09/20/20 0629 09/21/20  0638   WBC 4.96 5.43   HGB 6.7* 9.0*   HCT 21.5* 28.0*    284     CMP:   Recent Labs   Lab 09/20/20 0629 09/21/20  0638    139   K 4.4 4.0   * 110   CO2 22* 20*   * 116*   BUN 31* 26*   CREATININE 1.6* 1.5*   CALCIUM 8.6* 8.5*   ANIONGAP 8 9   EGFRNONAA 41* 44*       Significant Imaging: I have reviewed all pertinent imaging results/findings within the past 24 hours.

## 2020-09-21 NOTE — ASSESSMENT & PLAN NOTE
GI consult   Transfuse 2 units of PRBCs   Serial CBC   Protonix BID   9/19/20  -x1 episode of bleeding this morning   -H/H 7.4/23.3  - Case discussed with Dr. Farmer, will plan for EGD and colonoscopy in 5 days to decrease risk of bleeding due to recent anticoagulation.    9/20/20  -No further episodes   -H/H 6.7/21.5, will transfuse 2 units of PRBCs.   -Plan for EGD/colonoscopy on Tuesday.   9/21/20  -H/H stable   -Plans for procedure tomorrow

## 2020-09-21 NOTE — ASSESSMENT & PLAN NOTE
Likely due to hypoperfusion   Creatinine 1.9 (baseline 1.5)  Avoid nephrotoxic meds   Hold diuretics   Daily BMP   9/21/20  -Renal function at baseline

## 2020-09-21 NOTE — NURSING
Pt Aox4. VSS. No signs of respiratory distress on RA. Pt on CLD to go NPO at [place]. Pt began golyghtly.  BG managed with SSI. Pt had no c/o pain. Pt remained injury free through shift. Will continue to monitor and POC.

## 2020-09-21 NOTE — ASSESSMENT & PLAN NOTE
Appears compensated  Will HOLD Entresto due to VAIBHAV   Resume daily lasix    Monitor for volume overload

## 2020-09-21 NOTE — PLAN OF CARE
CM met with patient/wife at the bedside to assess for discharge needs.  Patient lives at home with his wife, Xuan.  He is independent with all needs and does not have home health services.  He does not anticipate any discharge needs at this time.  CM provided a transitional care folder, information on advanced directives, information on pharmacy bedside delivery, and discharge planning begins on admission with contact information for any needs/questions.     D/C Plan: Home, no needs  PCP: Dr Kishan Steven  [facility]erred [facility]acy[facility]  Discharge transportation: Wife  [facility]: Active  Pharmacy Bedside Delivery:  Yes     09/21/20 1023   Discharge Assessment   Assessment Type Discharge Planning Assessment   Confirmed/corrected address and phone number on facesheet? Yes   Assessment information obtained from? Patient;Medical Record   Expected Length of Stay (days)   (1-2)   Communicated expected length of stay with patient/caregiver yes   Prior to hospitilization cognitive status: Alert/Oriented   Prior to hospitalization functional status: Independent   Current cognitive status: Alert/Oriented   Current Functional Status: Independent   Facility Arrived From: Home   Lives With spouse   Able to Return to Prior Arrangements yes   Is patient able to care for self after discharge? Yes   Who are your caregiver(s) and their phone number(s)? Xuan Ortiz, Spouse [phone]   Patient's perception of discharge disposition home or selfcare   Patient currently being followed by outpatient case management? No   Patient currently receives any other outside agency services? No   Equipment Currently Used at Home none   Do you have any problems affording any of your prescribed medications? No   Is the patient taking medications as prescribed? yes   Does the patient have transportation home? Yes   Transportation Anticipated family or friend will provide   Dialysis Name and Scheduled days NA   Does the patient receive  services at the [facility]mad[facility]? No   Discharge Plan A Home with family   Discharge Plan B Home with family   DME Needed Upon Discharge  none   Patient/Family in Agreement with Plan yes

## 2020-09-22 ENCOUNTER — ANESTHESIA EVENT (OUTPATIENT)
Dept: ENDOSCOPY | Facility: HOSPITAL | Age: 80
DRG: 378 | End: 2020-09-22
Payer: MEDICARE

## 2020-09-22 ENCOUNTER — ANESTHESIA (OUTPATIENT)
Dept: ENDOSCOPY | Facility: HOSPITAL | Age: 80
DRG: 378 | End: 2020-09-22
Payer: MEDICARE

## 2020-09-22 VITALS
TEMPERATURE: 98 F | RESPIRATION RATE: 20 BRPM | HEART RATE: 63 BPM | HEIGHT: 69 IN | DIASTOLIC BLOOD PRESSURE: 63 MMHG | WEIGHT: 209 LBS | SYSTOLIC BLOOD PRESSURE: 137 MMHG | OXYGEN SATURATION: 99 % | BODY MASS INDEX: 30.96 KG/M2

## 2020-09-22 PROBLEM — N17.9 AKI (ACUTE KIDNEY INJURY): Status: RESOLVED | Noted: 2020-09-18 | Resolved: 2020-09-22

## 2020-09-22 PROBLEM — K92.2 LOWER GI BLEED: Status: RESOLVED | Noted: 2020-09-18 | Resolved: 2020-09-22

## 2020-09-22 PROBLEM — D62 ACUTE BLOOD LOSS ANEMIA: Status: RESOLVED | Noted: 2020-09-20 | Resolved: 2020-09-22

## 2020-09-22 LAB
ABO + RH BLD: NORMAL
ANION GAP SERPL CALC-SCNC: 11 MMOL/L (ref 8–16)
BASOPHILS # BLD AUTO: 0.03 K/UL (ref 0–0.2)
BASOPHILS NFR BLD: 0.6 % (ref 0–1.9)
BLD GP AB SCN CELLS X3 SERPL QL: NORMAL
BUN SERPL-MCNC: 22 MG/DL (ref 8–23)
CALCIUM SERPL-MCNC: 8.7 MG/DL (ref 8.7–10.5)
CHLORIDE SERPL-SCNC: 108 MMOL/L (ref 95–110)
CO2 SERPL-SCNC: 21 MMOL/L (ref 23–29)
CREAT SERPL-MCNC: 1.5 MG/DL (ref 0.5–1.4)
DIFFERENTIAL METHOD: ABNORMAL
EOSINOPHIL # BLD AUTO: 0.2 K/UL (ref 0–0.5)
EOSINOPHIL NFR BLD: 3.4 % (ref 0–8)
ERYTHROCYTE [DISTWIDTH] IN BLOOD BY AUTOMATED COUNT: 15 % (ref 11.5–14.5)
EST. GFR  (AFRICAN AMERICAN): 51 ML/MIN/1.73 M^2
EST. GFR  (NON AFRICAN AMERICAN): 44 ML/MIN/1.73 M^2
GLUCOSE SERPL-MCNC: 104 MG/DL (ref 70–110)
HCT VFR BLD AUTO: 28.7 % (ref 40–54)
HGB BLD-MCNC: 9 G/DL (ref 14–18)
IMM GRANULOCYTES # BLD AUTO: 0.01 K/UL (ref 0–0.04)
IMM GRANULOCYTES NFR BLD AUTO: 0.2 % (ref 0–0.5)
LYMPHOCYTES # BLD AUTO: 1 K/UL (ref 1–4.8)
LYMPHOCYTES NFR BLD: 18.8 % (ref 18–48)
MCH RBC QN AUTO: 28.8 PG (ref 27–31)
MCHC RBC AUTO-ENTMCNC: 31.4 G/DL (ref 32–36)
MCV RBC AUTO: 92 FL (ref 82–98)
MONOCYTES # BLD AUTO: 0.6 K/UL (ref 0.3–1)
MONOCYTES NFR BLD: 11 % (ref 4–15)
NEUTROPHILS # BLD AUTO: 3.5 K/UL (ref 1.8–7.7)
NEUTROPHILS NFR BLD: 66 % (ref 38–73)
NRBC BLD-RTO: 0 /100 WBC
PLATELET # BLD AUTO: 275 K/UL (ref 150–350)
PMV BLD AUTO: 9.4 FL (ref 9.2–12.9)
POCT GLUCOSE: 102 MG/DL (ref 70–110)
POTASSIUM SERPL-SCNC: 4.2 MMOL/L (ref 3.5–5.1)
RBC # BLD AUTO: 3.12 M/UL (ref 4.6–6.2)
SODIUM SERPL-SCNC: 140 MMOL/L (ref 136–145)
WBC # BLD AUTO: 5.36 K/UL (ref 3.9–12.7)

## 2020-09-22 PROCEDURE — 88305 TISSUE EXAM BY PATHOLOGIST: ICD-10-PCS | Mod: 26,HCNC,, | Performed by: PATHOLOGY

## 2020-09-22 PROCEDURE — 45385 COLONOSCOPY W/LESION REMOVAL: CPT | Mod: HCNC | Performed by: INTERNAL MEDICINE

## 2020-09-22 PROCEDURE — 86901 BLOOD TYPING SEROLOGIC RH(D): CPT | Mod: HCNC

## 2020-09-22 PROCEDURE — 27201012 HC FORCEPS, HOT/COLD, DISP: Mod: HCNC | Performed by: INTERNAL MEDICINE

## 2020-09-22 PROCEDURE — 63600175 PHARM REV CODE 636 W HCPCS: Mod: HCNC | Performed by: NURSE PRACTITIONER

## 2020-09-22 PROCEDURE — 63600175 PHARM REV CODE 636 W HCPCS: Mod: HCNC | Performed by: NURSE ANESTHETIST, CERTIFIED REGISTERED

## 2020-09-22 PROCEDURE — 43239 EGD BIOPSY SINGLE/MULTIPLE: CPT | Mod: 51,HCNC,, | Performed by: INTERNAL MEDICINE

## 2020-09-22 PROCEDURE — C9113 INJ PANTOPRAZOLE SODIUM, VIA: HCPCS | Mod: HCNC | Performed by: NURSE PRACTITIONER

## 2020-09-22 PROCEDURE — 80048 BASIC METABOLIC PNL TOTAL CA: CPT | Mod: HCNC

## 2020-09-22 PROCEDURE — 88342 IMHCHEM/IMCYTCHM 1ST ANTB: CPT | Mod: HCNC | Performed by: PATHOLOGY

## 2020-09-22 PROCEDURE — 37000009 HC ANESTHESIA EA ADD 15 MINS: Mod: HCNC | Performed by: INTERNAL MEDICINE

## 2020-09-22 PROCEDURE — 36415 COLL VENOUS BLD VENIPUNCTURE: CPT | Mod: HCNC

## 2020-09-22 PROCEDURE — 88305 TISSUE EXAM BY PATHOLOGIST: CPT | Mod: 26,HCNC,, | Performed by: PATHOLOGY

## 2020-09-22 PROCEDURE — 85025 COMPLETE CBC W/AUTO DIFF WBC: CPT | Mod: HCNC

## 2020-09-22 PROCEDURE — 45385 PR COLONOSCOPY,REMV LESN,SNARE: ICD-10-PCS | Mod: HCNC,,, | Performed by: INTERNAL MEDICINE

## 2020-09-22 PROCEDURE — 37000008 HC ANESTHESIA 1ST 15 MINUTES: Mod: HCNC | Performed by: INTERNAL MEDICINE

## 2020-09-22 PROCEDURE — 88342 IMHCHEM/IMCYTCHM 1ST ANTB: CPT | Mod: 26,HCNC,, | Performed by: PATHOLOGY

## 2020-09-22 PROCEDURE — 88342 CHG IMMUNOCYTOCHEMISTRY: ICD-10-PCS | Mod: 26,HCNC,, | Performed by: PATHOLOGY

## 2020-09-22 PROCEDURE — 45385 COLONOSCOPY W/LESION REMOVAL: CPT | Mod: HCNC,,, | Performed by: INTERNAL MEDICINE

## 2020-09-22 PROCEDURE — 25000003 PHARM REV CODE 250: Mod: HCNC | Performed by: NURSE PRACTITIONER

## 2020-09-22 PROCEDURE — 43239 PR EGD, FLEX, W/BIOPSY, SGL/MULTI: ICD-10-PCS | Mod: 51,HCNC,, | Performed by: INTERNAL MEDICINE

## 2020-09-22 PROCEDURE — 25000003 PHARM REV CODE 250: Mod: HCNC | Performed by: NURSE ANESTHETIST, CERTIFIED REGISTERED

## 2020-09-22 PROCEDURE — 88305 TISSUE EXAM BY PATHOLOGIST: CPT | Mod: HCNC | Performed by: PATHOLOGY

## 2020-09-22 PROCEDURE — 43239 EGD BIOPSY SINGLE/MULTIPLE: CPT | Mod: HCNC | Performed by: INTERNAL MEDICINE

## 2020-09-22 PROCEDURE — 27201089 HC SNARE, DISP (ANY): Mod: HCNC | Performed by: INTERNAL MEDICINE

## 2020-09-22 RX ORDER — LIDOCAINE HYDROCHLORIDE 10 MG/ML
INJECTION, SOLUTION EPIDURAL; INFILTRATION; INTRACAUDAL; PERINEURAL
Status: DISCONTINUED | OUTPATIENT
Start: 2020-09-22 | End: 2020-09-22

## 2020-09-22 RX ORDER — PANTOPRAZOLE SODIUM 40 MG/1
40 TABLET, DELAYED RELEASE ORAL DAILY
Qty: 30 TABLET | Refills: 0 | Status: SHIPPED | OUTPATIENT
Start: 2020-09-22 | End: 2020-11-03 | Stop reason: SDUPTHER

## 2020-09-22 RX ORDER — SODIUM CHLORIDE, SODIUM LACTATE, POTASSIUM CHLORIDE, CALCIUM CHLORIDE 600; 310; 30; 20 MG/100ML; MG/100ML; MG/100ML; MG/100ML
INJECTION, SOLUTION INTRAVENOUS CONTINUOUS PRN
Status: DISCONTINUED | OUTPATIENT
Start: 2020-09-22 | End: 2020-09-22

## 2020-09-22 RX ORDER — PROPOFOL 10 MG/ML
VIAL (ML) INTRAVENOUS
Status: DISCONTINUED | OUTPATIENT
Start: 2020-09-22 | End: 2020-09-22

## 2020-09-22 RX ADMIN — PROPOFOL 50 MG: 10 INJECTION, EMULSION INTRAVENOUS at 07:09

## 2020-09-22 RX ADMIN — PANTOPRAZOLE SODIUM 40 MG: 40 INJECTION, POWDER, LYOPHILIZED, FOR SOLUTION INTRAVENOUS at 08:09

## 2020-09-22 RX ADMIN — PROPOFOL 40 MG: 10 INJECTION, EMULSION INTRAVENOUS at 07:09

## 2020-09-22 RX ADMIN — LIDOCAINE HYDROCHLORIDE 50 MG: 10 INJECTION, SOLUTION EPIDURAL; INFILTRATION; INTRACAUDAL; PERINEURAL at 07:09

## 2020-09-22 RX ADMIN — SODIUM CHLORIDE, SODIUM LACTATE, POTASSIUM CHLORIDE, AND CALCIUM CHLORIDE: 600; 310; 30; 20 INJECTION, SOLUTION INTRAVENOUS at 07:09

## 2020-09-22 RX ADMIN — FUROSEMIDE 40 MG: 40 TABLET ORAL at 08:09

## 2020-09-22 NOTE — DISCHARGE INSTRUCTIONS
Understanding Colon and Rectal Polyps    The colon (also called the large intestine) is a muscular tube that forms the last part of the digestive tract. It absorbs water and stores food waste. The colon is about 4 to 6 feet long. The rectum is the last 6 inches of the colon. The colon and rectum have a smooth lining composed of millions of cells. Changes in these cells can lead to growths in the colon that can become cancerous and should be removed. Multiple tests are available to screen for colon cancer, but the colonoscopy is the most recommended test. During colonoscopy, these polyps can be removed. How often you need this test depends on many things including your condition, your family history, symptoms, and what the findings were at the previous colonoscopy.   When the colon lining changes  Changes that happen in the cells that line the colon or rectum can lead to growths called polyps. Over a period of years, polyps can turn cancerous. Removing polyps early may prevent cancer from ever forming.  Polyps  Polyps are fleshy clumps of tissue that form on the lining of the colon or rectum. Small polyps are usually benign (not cancerous). However, over time, cells in a polyp can change and become cancerous. Certain types of polyps known as adenomatous polyps are premalignant. The risk for invasive cancer increases with the size of the polyp and certain cell and gene features. This means that they can become cancerous if they're not removed. Hyperplastic polyps are benign. They can grow quite large and not turn cancerous.   Cancer  Almost all colorectal cancers start when polyp cells begin growing abnormally. As a cancerous tumor grows, it may involve more and more of the colon or rectum. In time, cancer can also grow beyond the colon or rectum and spread to nearby organs or to glands called lymph nodes. The cells can also travel to other parts of the body. This is known as metastasis. The earlier a cancerous  tumor is removed, the better the chance of preventing its spread.    Date Last Reviewed: 8/1/2016  © 4278-8975 [employer]. [address] [place] [address], [place] [address]. All rights reserved. This information is not intended as a substitute for professional medical care. Always follow your healthcare professional's instructions.        Gastritis (Adult)    Gastritis is inflammation and irritation of the stomach lining. It can be present for a short time (acute) or be long lasting (chronic). Gastritis is often caused by infection with bacteria called H pylori. More than a third of people in the [place] have this bacteria in their bodies. In many cases, H pylori causes no problems or symptoms. In some people, though, the infection irritates the stomach lining and causes gastritis. Other causes of stomach irritation include drinking alcohol or taking pain-relieving medicines called NSAIDs (such as aspirin or ibuprofen).   Symptoms of gastritis can include:  · Abdominal pain or bloating  · Loss of appetite  · Nausea or vomiting  · Vomiting blood or having black stools  · Feeling more tired than usual  An inflamed and irritated stomach lining is more likely to develop a sore called an ulcer. To help prevent this, gastritis should be treated.  Home care  If needed, medicines may be prescribed. If you have H pylori infection, treating it will likely relieve your symptoms. Other changes can help reduce stomach irritation and help it heal.  · If you have been prescribed medicines for H pylori infection, take them as directed. Take all of the medicine until it is finished or your healthcare provider tells you to stop, even if you feel better.  · Your healthcare provider may recommend avoiding NSAIDs. If you take daily aspirin for your heart or other medical reasons, do not stop without talking to your healthcare provider first.  · Avoid drinking alcohol.  · Stop smoking. Smoking can irritate the stomach and delay  healing. As much as possible, stay away from second hand smoke.  Follow-up care  Follow up with your healthcare provider, or as advised by our staff. Testing may be needed to check for inflammation or an ulcer.  When to seek medical advice  Call your healthcare provider for any of the following:  · Stomach pain that gets worse or moves to the lower right abdomen (appendix area)  · Chest pain that appears or gets worse, or spreads to the back, neck, shoulder, or arm  · Frequent vomiting (cant keep down liquids)  · Blood in the stool or vomit (red or black in color)  · Feeling weak or dizzy  · Fever of 100.4ºF (38ºC) or higher, or as directed by your healthcare provider  Date Last Reviewed: 6/22/2015 © 2000-2017 [employer]. [address] [place] [address], [place] [address]. All rights reserved. This information is not intended as a substitute for professional medical care. Always follow your healthcare professional's instructions.

## 2020-09-22 NOTE — [employer] PATIENT INSTRUCTIONS
Discharge Summary/Instructions after an Endoscopic Procedure  Patient Name: Jake Ortiz  Patient MRN: [id number]  Patient YOB: 1940 Tuesday, September 22, 2020 Javier Farmer MD  RESTRICTIONS:  During your procedure today, you received medications for sedation.  These   medications may affect your judgment, balance and coordination.  Therefore,   for 24 hours, you have the following restrictions:   - DO NOT drive a car, operate machinery, make legal/financial decisions,   sign important papers or drink alcohol.    ACTIVITY:  Today: no heavy lifting, straining or running due to procedural   sedation/anesthesia.  The following day: return to full activity including work.  DIET:  Eat and drink normally unless instructed otherwise.     TREATMENT FOR COMMON SIDE EFFECTS:  - Mild abdominal pain, nausea, belching, bloating or excessive gas:  rest,   eat lightly and use a heating pad.  - Sore Throat: treat with throat lozenges and/or gargle with warm salt   water.  - Because air was used during the procedure, expelling large amounts of air   from your rectum or belching is normal.  - If a bowel prep was taken, you may not have a bowel movement for 1-3 days.    This is normal.  SYMPTOMS TO WATCH FOR AND REPORT TO YOUR PHYSICIAN:  1. Abdominal pain or bloating, other than gas cramps.  2. Chest pain.  3. Back pain.  4. Signs of infection such as: chills or fever occurring within 24 hours   after the procedure.  5. Rectal bleeding, which would show as bright red, maroon, or black stools.   (A tablespoon of blood from the rectum is not serious, especially if   hemorrhoids are present.)  6. Vomiting.  7. Weakness or dizziness.  GO DIRECTLY TO THE NEAREST EMERGENCY ROOM IF YOU HAVE ANY OF THE FOLLOWING:      Difficulty breathing              Chills and/or fever over 101 F   Persistent vomiting and/or vomiting blood   Severe abdominal pain   Severe chest pain   Black, tarry stools   Bleeding- more than one  tablespoon   Any other symptom or condition that you feel may need urgent attention  Your doctor recommends these additional instructions:  If any biopsies were taken, your doctors clinic will contact you in 1 to 2   weeks with any results.  - Return patient to [facility] for ongoing care.   - Resume previous diet.   - Continue present medications.   - Await pathology results.  For questions, problems or results please call your physician Javier Farmer MD at Work:  [phone]  If you have any questions about the above instructions, call the GI   department at [phone] or call the endoscopy unit at [phone]   from 7am until 3 pm.  [facility], EMERGENCY ROOM PHONE NUMBER:   [phone]  IF A COMPLICATION OR EMERGENCY SITUATION ARISES AND YOU ARE UNABLE TO REACH   YOUR PHYSICIAN - GO DIRECTLY TO THE EMERGENCY ROOM.  I have read or have had read to me these discharge instructions for my   procedure and have received a written copy.  I understand these   instructions and will follow-up with my physician if I have any questions.     __________________________________       _____________________________________  Nurse Signature                                          Patient/Designated   Responsible Party Signature  MD Javier Louie MD  9/22/2020 7:14:55 AM  This report has been verified and signed electronically.  [employer]

## 2020-09-22 NOTE — PLAN OF CARE
Problem: Adult Inpatient Plan of Care  Goal: Plan of Care Review  Outcome: Ongoing, Progressing  Flowsheets (Taken 9/22/2020 0059)  Plan of Care Reviewed With: patient   Pt had no adverse events during shift. Pt free of falls. Call light in reach. Side rails x 2. Pt repositions independently. Paced in 60s on tele monitor. VSS. Chart reviewed, will continue to monitor.

## 2020-09-22 NOTE — H&P
PRE PROCEDURE H&P    Patient Name: Jake Ortiz  MRN: [id number]  : 1940  Date of Procedure:  2020  Referring Physician: Self, Aaareferral  Primary Physician: Chanda Brush MD  Procedure Physician: Javier Farmer MD       Planned Procedure: Colonoscopy and EGD  Diagnosis: iron deficiency anemia  Maroon stools  Chief Complaint: Same as above    HPI: Patient is an 80 y.o. male is here for the above.       Anticoagulation: Plavix    Past Medical History:   Past Medical History:   Diagnosis Date    Abnormal PFT     Anemia     Arthritis     Atrial fibrillation 10/19/2017    Back pain     Congestive heart failure 3/5/2018    Coronary artery disease     Diabetes mellitus 2018     am 2018    DM (diabetes mellitus)      am 2020    Hyperlipemia     Hypertension     Myocardial infarction     Obesity     NASREEN (obstructive sleep apnea) 2018    Prostate cancer 2015    Tobacco dependence     Type 2 diabetes mellitus         Past Surgical History:  Past Surgical History:   Procedure Laterality Date    CORONARY ARTERY BYPASS GRAFT  1987    EPIDURAL STEROID INJECTION N/A 2019    Procedure: Lumbar L5/S1 IL XUAN;  Surgeon: Tacho Trivedi MD;  Location: [facility];  Service: Pain Management;  Laterality: N/A;    EPIDURAL STEROID INJECTION N/A 2020    Procedure: Lumbar L5/S1 IL XUAN;  Surgeon: Tacho Trivedi MD;  Location: [facility];  Service: Pain Management;  Laterality: N/A;    EPIDURAL STEROID INJECTION N/A 2/10/2020    Procedure: Caudal XUAN;  Surgeon: Tacho Trivedi MD;  Location: [facility];  Service: Pain Management;  Laterality: N/A;    PROSTATE SURGERY      prostate radiation    SPINE SURGERY      fusion    TONSILLECTOMY          Home Medications:  Prior to Admission medications    Medication Sig Start Date End Date Taking? Authorizing Provider   acetaminophen (TYLENOL) 500 MG tablet Take 500 mg by mouth every 6  (six) hours as needed for Pain.   Yes Historical Provider   apixaban (ELIQUIS) 5 mg Tab Take 1 tablet (5 mg total) by mouth 2 (two) times daily. 7/22/20  Yes Phillip Hood MD   aspirin (ECOTRIN) 81 MG EC tablet Take 81 mg by mouth. 5/23/18  Yes Historical Provider   atorvastatin (LIPITOR) 80 MG tablet TK 1 T PO D 7/16/18  Yes Historical Provider   cholecalciferol, vitamin D3, (VITAMIN D3) 1,000 unit capsule Take 5,000 Units by mouth once daily.   Yes Historical Provider   clonazePAM (KLONOPIN) 1 MG tablet Take 1 tablet (1 mg total) by mouth every evening. 9/9/20 10/9/20 Yes Chanda Brush MD   clopidogrel (PLAVIX) 75 mg tablet Take 75 mg by mouth once daily.   Yes Historical Provider   ENTRESTO 24-26 mg per tablet  1/10/19  Yes Historical Provider   fish oil-omega-3 fatty acids 300-1,000 mg capsule Take 1 capsule by mouth once daily.   Yes Historical Provider   furosemide (LASIX) 40 MG tablet Take 1 tablet (40 mg total) by mouth once daily. Take extra dose as needed for swelling or weight gain 3 lbs 5/22/20 5/22/21 Yes Phillip Hood MD   gabapentin (NEURONTIN) 300 MG capsule Take 2 capsules (600 mg total) by mouth every evening. Can take 3 caps qhs or tid if tolerated, may cause drowsiness 8/13/20 11/11/20 Yes Brandt Gamez MD   levocetirizine (XYZAL) 5 MG tablet Take 1 tablet (5 mg total) by mouth every evening. 5/20/20  Yes Chanda Brush MD   multivitamin capsule Take 1 capsule by mouth once daily.   Yes Historical Provider   blood sugar diagnostic Strp Check blood glucose levels daily in the AM fasting and 1-2 times more daily. 10/24/19   Shraddha Alvarenga NP   blood-glucose meter kit Use as instructed 1/12/18 1/12/19  Chanda Brush MD   fluticasone propionate (FLONASE) 50 mcg/actuation nasal spray 2 sprays (100 mcg total) by Each Nostril route once daily. 5/20/20   [facility] Brush MD   [facility] blood glucose levels daily in the AM fasting and 1-2 times more daily.  20   Chanda Brush MD   nitroGLYCERIN (NITROSTAT) 0.3 MG SL tablet PLACE 1 TABLET UNDER THE TONGUE EVERY 5 MINUTES AS NEEDED FOR CHEST PAIN AS DIRECTED 6/10/18   Historical Provider        Allergies:  Review of patient's allergies indicates:  No Known Allergies     Social History:   Social History     Socioeconomic History    Marital status:      Spouse name: Not on file    Number of children: Not on file    Years of education: Not on file    Highest education level: Not on file   Occupational History     Employer: [employer]   Social Needs    Financial resource strain: Not hard at all    Food insecurity     Worry: Never true     Inability: Never true    Transportation needs     Medical: No     Non-medical: No   Tobacco Use    Smoking status: Former Smoker     Packs/day: 1.50     Years: 20.00     Pack years: 30.00     Types: Cigarettes     Start date:      Quit date:      Years since quittin.7    Smokeless tobacco: Never Used   Substance and Sexual Activity    Alcohol use: No     Frequency: Never     Binge frequency: Never    Drug use: No    Sexual activity: Not Currently   Lifestyle    Physical activity     Days per week: 0 days     Minutes per session: 30 min    Stress: Not at all   Relationships    Social connections     Talks on phone: Twice a week     Gets together: More than three times a week     Attends Gnosticist service: Not on file     Active member of club or organization: Yes     Attends meetings of clubs or organizations: More than 4 times per year     Relationship status:    Other Topics Concern    Not on file   Social History Narrative    Not on file       Family History:  Family History   Problem Relation Age of Onset    Heart attack Mother     Diabetes Mother     Heart disease Mother     Cataracts Mother     Stroke Father     Heart disease Father     Heart disease Brother        ROS: No acute cardiac events, no acute respiratory  "complaints.     Physical Exam (all patients):    /60 (BP Location: Right arm, Patient Position: Lying)   Pulse 61   Temp 97.8 °F (36.6 °C) (Oral)   Resp 18   Ht 5' 9" (1.753 m)   Wt 94.8 kg (208 lb 15.9 oz)   SpO2 97%   BMI 30.86 kg/m²   Lungs: Clear to auscultation bilaterally, respirations unlabored  Heart: Regular rate and rhythm, S1 and S2 normal, no obvious murmurs  Abdomen:         Soft, non-tender, bowel sounds normal, no masses, no organomegaly    Lab Results   Component Value Date    WBC 5.43 09/21/2020    MCV 91 09/21/2020    RDW 15.5 (H) 09/21/2020     09/21/2020    INR 1.2 09/18/2020     (H) 09/21/2020    HGBA1C 6.1 (H) 09/17/2020    BUN 26 (H) 09/21/2020     09/21/2020    K 4.0 09/21/2020     09/21/2020        SEDATION PLAN: per anesthesia      History reviewed, vital signs satisfactory, cardiopulmonary status satisfactory, sedation options, risks and plans have been discussed with the patient  All their questions were answered and the patient agrees to the sedation procedures as planned and the patient is deemed an appropriate candidate for the sedation as planned.    Procedure explained to patient, informed consent obtained and placed in chart.    Javier Farmer  9/22/2020  6:52 AM     "

## 2020-09-22 NOTE — TRANSFER OF CARE
"Anesthesia Transfer of Care Note    Patient: Jake Ortiz    Procedure(s) Performed: Procedure(s) (LRB):  EGD (ESOPHAGOGASTRODUODENOSCOPY) (N/A)  COLONOSCOPY (N/A)    Patient location: GI    Anesthesia Type: MAC    Transport from OR: Transported from OR on room air with adequate spontaneous ventilation    Post pain: adequate analgesia    Post assessment: no apparent anesthetic complications and tolerated procedure well    Post vital signs: stable    Level of consciousness: awake, alert and oriented    Nausea/Vomiting: no nausea/vomiting    Complications: none    Transfer of care protocol was followed      Last vitals:   Visit Vitals  /62 (BP Location: Left arm, Patient Position: Lying)   Pulse 68   Temp 36.5 °C (97.7 °F) (Temporal)   Resp 18   Ht 5' 9" (1.753 m)   Wt 94.8 kg (208 lb 15.9 oz)   SpO2 (!) 68%   BMI 30.86 kg/m²     "

## 2020-09-22 NOTE — [employer] PATIENT INSTRUCTIONS
Discharge Summary/Instructions after an Endoscopic Procedure  Patient Name: Jake Ortiz  Patient MRN: [id number]  Patient YOB: 1940 Tuesday, September 22, 2020 Javier Farmer MD  RESTRICTIONS:  During your procedure today, you received medications for sedation.  These   medications may affect your judgment, balance and coordination.  Therefore,   for 24 hours, you have the following restrictions:   - DO NOT drive a car, operate machinery, make legal/financial decisions,   sign important papers or drink alcohol.    ACTIVITY:  Today: no heavy lifting, straining or running due to procedural   sedation/anesthesia.  The following day: return to full activity including work.  DIET:  Eat and drink normally unless instructed otherwise.     TREATMENT FOR COMMON SIDE EFFECTS:  - Mild abdominal pain, nausea, belching, bloating or excessive gas:  rest,   eat lightly and use a heating pad.  - Sore Throat: treat with throat lozenges and/or gargle with warm salt   water.  - Because air was used during the procedure, expelling large amounts of air   from your rectum or belching is normal.  - If a bowel prep was taken, you may not have a bowel movement for 1-3 days.    This is normal.  SYMPTOMS TO WATCH FOR AND REPORT TO YOUR PHYSICIAN:  1. Abdominal pain or bloating, other than gas cramps.  2. Chest pain.  3. Back pain.  4. Signs of infection such as: chills or fever occurring within 24 hours   after the procedure.  5. Rectal bleeding, which would show as bright red, maroon, or black stools.   (A tablespoon of blood from the rectum is not serious, especially if   hemorrhoids are present.)  6. Vomiting.  7. Weakness or dizziness.  GO DIRECTLY TO THE NEAREST EMERGENCY ROOM IF YOU HAVE ANY OF THE FOLLOWING:      Difficulty breathing              Chills and/or fever over 101 F   Persistent vomiting and/or vomiting blood   Severe abdominal pain   Severe chest pain   Black, tarry stools   Bleeding- more than one  tablespoon   Any other symptom or condition that you feel may need urgent attention  Your doctor recommends these additional instructions:  If any biopsies were taken, your doctors clinic will contact you in 1 to 2   weeks with any results.  - Return patient to [facility] for ongoing care.   - Resume previous diet.   - Continue present medications.   - Await pathology results.   - Repeat colonoscopy in 5 years for surveillance based on pathology results.     - Outpatient GI follow up and setup for video capsule endoscopy  For questions, problems or results please call your physician Javier Farmer MD at Work:  [phone]  If you have any questions about the above instructions, call the GI   department at [phone] or call the endoscopy unit at [phone]   from 7am until 3 pm.  [facility], EMERGENCY ROOM PHONE NUMBER:   [phone]  IF A COMPLICATION OR EMERGENCY SITUATION ARISES AND YOU ARE UNABLE TO REACH   YOUR PHYSICIAN - GO DIRECTLY TO THE EMERGENCY ROOM.  I have read or have had read to me these discharge instructions for my   procedure and have received a written copy.  I understand these   instructions and will follow-up with my physician if I have any questions.     __________________________________       _____________________________________  Nurse Signature                                          Patient/Designated   Responsible Party Signature  MD Javier Louie MD  9/22/2020 7:47:38 AM  This report has been verified and signed electronically.  [employer]

## 2020-09-22 NOTE — ANESTHESIA POSTPROCEDURE EVALUATION
Anesthesia Post Evaluation    Patient: Jake Ortiz    Procedure(s) Performed: Procedure(s) (LRB):  EGD (ESOPHAGOGASTRODUODENOSCOPY) (N/A)  COLONOSCOPY (N/A)    Final Anesthesia Type: MAC    Patient location during evaluation: GI PACU  Patient participation: Yes- Able to Participate  Level of consciousness: awake and alert and oriented  Post-procedure vital signs: reviewed and stable  Pain management: adequate  Airway patency: patent  NASREEN mitigation strategies: Multimodal analgesia  PONV status at discharge: No PONV  Anesthetic complications: no      Cardiovascular status: hemodynamically stable  Respiratory status: unassisted, spontaneous ventilation and room air  Hydration status: euvolemic  Follow-up not needed.          Vitals Value Taken Time   /86 09/22/20 0742   Temp 36.5 °C (97.7 °F) 09/22/20 0734   Pulse 58 09/22/20 0742   Resp 17 09/22/20 0742   SpO2 100 % 09/22/20 0742         No case tracking events are documented in the log.      Pain/Thomas Score: Thomas Score: 10 (9/22/2020  7:42 AM)

## 2020-09-22 NOTE — PLAN OF CARE
To room 567 via wheel chair; tele monitor reconnected; Sr up x 2 and call light in reach; verbal report to nurse Labaron; no complaints or distress

## 2020-09-22 NOTE — ANESTHESIA PREPROCEDURE EVALUATION
09/22/2020  Jake Ortiz is a 80 y.o., male.    Anesthesia Evaluation    I have reviewed the Patient Summary Reports.    I have reviewed the Nursing Notes. I have reviewed the NPO Status.   I have reviewed the Medications.     Review of Systems  Anesthesia Hx:  No problems with previous Anesthesia    Social:  No Alcohol Use, Former Smoker    Hematology/Oncology:         -- Anemia: --  Cancer in past history:  Other (see Oncology comments) radiation  Oncology Comments: prostate     EENT/Dental:EENT/Dental Normal   Cardiovascular:   Pacemaker Hypertension Past MI CAD  Dysrhythmias atrial fibrillation CHF hyperlipidemia ECHO 06/2020    ·Concentric left ventricular hypertrophy.  ·Left ventricular systolic function. The estimated ejection fraction is 50%.  ·Indeterminate left ventricular diastolic function.  Severe left atrial enlargement.  Mild-to-moderate mitral regurgitation.  Mild to moderate tricuspid regurgitation.    Ventricular-paced rhythm   Biventricular pacemaker detected   Abnormal ECG   When compared with ECG of 27-SEP-2017 11:09,   Previous ECG has undetermined rhythm, needs review   Confirmed by JACI DAVILA MD ([id number]) on 7/17/2020 6:35:38 PM   Pulmonary:   COPD Sleep Apnea, CPAP    Education provided regarding risk of obstructive sleep apnea     Renal/:   Chronic Renal Disease, CRI    Hepatic/GI:   Bowel Prep. Denies GERD.    Musculoskeletal:   Arthritis   Spine Disorders: lumbar Chronic Pain    Neurological:  Neurology Normal    Endocrine:   Diabetes    Dermatological:  Skin Normal    Psych:  Psychiatric Normal           Physical Exam  General:  Well nourished    Airway/Jaw/Neck:  Airway Findings: Mouth Opening: Normal Tongue: Normal  General Airway Assessment: Adult  Mallampati: II  TM Distance: Normal, at least 6 cm  Jaw/Neck Findings:  Neck ROM: Normal ROM      Dental:  Dental Findings: Upper  Dentures   Chest/Lungs:  Chest/Lungs Findings: Clear to auscultation, Normal Respiratory Rate     Heart/Vascular:  Heart Findings: Rate: Normal        Mental Status:  Mental Status Findings:  Cooperative, Alert and Oriented         Anesthesia Plan  Type of Anesthesia, risks & benefits discussed:  Anesthesia Type:  MAC  Patient's Preference:   Intra-op Monitoring Plan: standard ASA monitors  Intra-op Monitoring Plan Comments:   Post Op Pain Control Plan:   Post Op Pain Control Plan Comments:   Induction:   IV  Beta Blocker:  Patient is on a Beta-Blocker and has received one dose within the past 24 hours (No further documentation required).       Informed Consent: Patient understands risks and agrees with Anesthesia plan.  Questions answered. Anesthesia consent signed with patient.  ASA Score: 3     Day of Surgery Review of History & Physical: I have interviewed and examined the patient. I have reviewed the patient's H&P dated:  There are no significant changes.          Ready For Surgery From Anesthesia Perspective.

## 2020-09-22 NOTE — PLAN OF CARE
09/22/20 1539   Final Note   Assessment Type Final Discharge Note   Anticipated Discharge Disposition Home   Right Care Referral Info   Post Acute Recommendation No Care

## 2020-09-23 ENCOUNTER — PATIENT OUTREACH (OUTPATIENT)
Dept: ADMINISTRATIVE | Facility: CLINIC | Age: 80
End: 2020-09-23

## 2020-09-23 ENCOUNTER — PATIENT MESSAGE (OUTPATIENT)
Dept: CARDIOLOGY | Facility: CLINIC | Age: 80
End: 2020-09-23

## 2020-09-23 ENCOUNTER — TELEPHONE (OUTPATIENT)
Dept: FAMILY MEDICINE | Facility: CLINIC | Age: 80
End: 2020-09-23

## 2020-09-23 NOTE — TELEPHONE ENCOUNTER
Spoke with patient he wanted to know do you need to see him to go over all of his blood work from the hospital? He also wanted to know does he need to do more blood work for you?

## 2020-09-23 NOTE — DISCHARGE SUMMARY
[facility] Hospital Medicine  Discharge Summary      Patient Name: Jake Ortiz  MRN: [id number]  Admission Date: 9/18/2020  Hospital Length of Stay: 4 days  Discharge Date and Time: 9/22/2020 10:29 AM  Attending Physician: No att. providers found   Discharging Provider: DANIEL Pantoja  Primary Care Provider: Chanda Brush MD      HPI:    Jake Ortiz is a 80 y.o. male patient with a PMHx of Afib, CHF, CAD, DM, HLD, HTN, and MI who presents to the Emergency Department for evaluation of rectal bleeding which onset gradually 3-4 days ago. Reports maroon-colored blood in stool. Symptoms are constant and moderate in severity. No mitigating or exacerbating factors reported. Associated sxs include generalized weakness. Patient denies any fever, chills, abdominal pain, n/v, constipation, dizziness, lightheadedness, CP, SOB, and all other sxs at this time. ED workup showed: H/H 5.1/16.9, BUN 40, Creatinine 1.9. GI was consulted in the ED. Patient placed in observation for GI bleed.     Procedure(s) (LRB):  EGD (ESOPHAGOGASTRODUODENOSCOPY) (N/A)  COLONOSCOPY (N/A)      Hospital Course:   79 y/o male admitted for lower GI bleed and acute blood loss anemia. Initial H/H of 5.1/16.9.  Patient was transfused 2 units of PRBCs. GI consulted. Will hold anticoagulations. Today H/H 7.4/23.3. Case  discussed with Dr. Farmer, will need  EGD and colonoscopy to evaluate further and given recent anticoagulation to decrease the risk of bleeding from the procedures will plan for the procedures in 5 days. As of 9/20 pt had a bowel movement with red blood yesterday morning, no further episodes reported. This morning H/H 6.7/21.5, will transfuse 2 units of PRBCs. Plans for EGD/colonoscopy on Tuesday. As of 9/21 no active bleeding noted. H/H 9.0/28.0. Continue to monitor. Plan for procedure tomorrow. 09/22: Patient had EGD that showed gastritis which was biopsied. Discussed with GI who recommends dc home  today, restart anti coagulation tomorrow, and f/u OP for video capsule study. GI will arrange OP f/u and call patient. New Rx for Pantoprazole 40mg daily sent to pharmacy. Discussed all with patient and spouse, who verbalized understanding.      Consults:   Consults (From admission, onward)        Status Ordering Provider     Inpatient consult to Gastroenterology  Once     Provider:  Javier Farmer MD    Completed VENITA MCDANIEL          No new Assessment & Plan notes have been filed under this hospital service since the last note was generated.  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/2015 Yes     Chronic    CAD (coronary artery disease) [I25.10] 07/23/2014 Yes    Hypertension [I10]  Yes    Hyperlipemia [E78.5]  Yes      Problems Resolved During this Admission:    Diagnosis Date Noted Date Resolved POA    PRINCIPAL PROBLEM:  Lower GI bleed [K92.2] 09/18/2020 09/22/2020 Yes    Acute blood loss anemia [D62] 09/20/2020 09/22/2020 Yes    VAIBHAV (acute kidney injury) [N17.9] 09/18/2020 09/22/2020 Yes       Discharged Condition: stable    Disposition: Home or Self Care    Follow Up:    Patient Instructions:      Diet Cardiac     Diet diabetic     Notify your health care provider if you experience any of the following:  persistent dizziness, light-headedness, or visual disturbances     Notify your health care provider if you experience any of the following:  increased confusion or weakness     Notify your health care provider if you experience any of the following:   Order Comments: Rectal bleeding, melena, coffee ground emesis     Activity as tolerated       Significant Diagnostic Studies: Labs:   BMP:   Recent Labs   Lab 09/22/20  0641         K 4.2      CO2 21*   BUN 22    CREATININE 1.5*   CALCIUM 8.7   , CMP   Recent Labs   Lab 09/22/20  0641      K 4.2      CO2 21*      BUN 22   CREATININE 1.5*   CALCIUM 8.7   ANIONGAP 11   ESTGFRAFRICA 51*   EGFRNONAA 44*   , CBC   Recent Labs   Lab 09/22/20  0641   WBC 5.36   HGB 9.0*   HCT 28.7*       and All labs within the past 24 hours have been reviewed    Pending Diagnostic Studies:     Procedure Component Value Units Date/Time    Specimen to Pathology, Surgery Gastrointestinal tract [[id number]] Collected: 09/22/20 0732    Order Status: Sent Lab Status: In process Updated: 09/22/20 1006         Medications:  Reconciled Home Medications:      Medication List      START taking these medications    pantoprazole 40 MG tablet  Commonly known as: PROTONIX  Take 1 tablet (40 mg total) by mouth once daily.        CONTINUE taking these medications    acetaminophen 500 MG tablet  Commonly known as: TYLENOL  Take 500 mg by mouth every 6 (six) hours as needed for Pain.     apixaban 5 mg Tab  Commonly known as: ELIQUIS  Take 1 tablet (5 mg total) by mouth 2 (two) times daily.     aspirin 81 MG EC tablet  Commonly known as: ECOTRIN  Take 81 mg by mouth.     atorvastatin 80 MG tablet  Commonly known as: LIPITOR  TK 1 T PO D     blood sugar diagnostic Strp  Check blood glucose levels daily in the AM fasting and 1-2 times more daily.     blood-glucose meter kit  Use as instructed     clonazePAM 1 MG tablet  Commonly known as: KLONOPIN  Take 1 tablet (1 mg total) by mouth every evening.     clopidogreL 75 mg tablet  Commonly known as: PLAVIX  Take 75 mg by mouth once daily.     ENTRESTO 24-26 mg per tablet  Generic drug: sacubitriL-valsartan     fish oil-omega-3 fatty acids 300-1,000 mg capsule  Take 1 capsule by mouth once daily.     fluticasone propionate 50 mcg/actuation nasal spray  Commonly known as: FLONASE  2 sprays (100 mcg total) by Each Nostril route once daily.     furosemide 40 MG tablet  Commonly known as: LASIX  Take  1 tablet (40 mg total) by mouth once daily. Take extra dose as needed for swelling or weight gain 3 lbs     gabapentin 300 MG capsule  Commonly known as: NEURONTIN  Take 2 capsules (600 mg total) by mouth every evening. Can take 3 caps qhs or tid if tolerated, may cause drowsiness     lancets Misc  Check blood glucose levels daily in the AM fasting and 1-2 times more daily.     levocetirizine 5 MG tablet  Commonly known as: XYZAL  Take 1 tablet (5 mg total) by mouth every evening.     multivitamin capsule  Take 1 capsule by mouth once daily.     nitroGLYCERIN 0.3 MG SL tablet  Commonly known as: NITROSTAT  PLACE 1 TABLET UNDER THE TONGUE EVERY 5 MINUTES AS NEEDED FOR CHEST PAIN AS DIRECTED     VITAMIN D3 25 mcg (1,000 unit) capsule  Generic drug: cholecalciferol (vitamin D3)  Take 5,000 Units by mouth once daily.        STOP taking these medications    spironolactone 25 MG tablet  Commonly known as: ALDACTONE            Indwelling Lines/Drains at time of discharge:   Lines/Drains/Airways     None                 Time spent on the discharge of patient: 60 minutes  Patient was seen and examined on the date of discharge and determined to be suitable for discharge.         DOMINGO Pantoja-C  Department of Hospital Medicine  [facility]

## 2020-09-23 NOTE — PATIENT INSTRUCTIONS
Lower GI Bleeding (Stable)  You have signs of blood in your stool. This is called rectal bleeding. The bleeding may have begun in another part of your gastrointestinal (GI) tract. If the blood is bright red, it is likely coming from the lower part of the GI tract. If the blood is black or dark, it might be coming from higher up in the GI tract. Very small amounts of GI bleeding may not be visible and can only be discovered during a test on your stool. Possible causes of lower GI bleeding include:  · Hemorrhoids  · Anal fissures  · Diverticulitis  · Inflammatory bowel disease (Crohn's disease or ulcerative colitis)  · Polyps (growths) in the intestine  Note: Iron supplements and medicines for diarrhea or upset stomach can cause black stools. Foods such as licorice and red beets can also discolor the stool and be mistaken for bleeding. These are not bleeding and are not a cause for alarm.  Home care  You have not lost a large amount of blood and your condition appears stable at this time. You may resume normal activity as long as you feel well.  Avoid NSAIDs, such as aspirin, ibuprofen, or naproxen. They can irritate the stomach and cause further bleeding. If you are taking these medicines for other medical reasons, talk to your healthcare provider before you stop them.   Follow-up care  Follow up with your healthcare provider as advised. Further tests may be needed to find the cause of your bleeding.  When to seek medical advice  Call your healthcare provider for any of the following:  · Large amount of rectal bleeding   · Increasing abdominal pain  · Weakness, dizziness  Call 911  Get emergency medical care if any of the following occur:  · Loss of consciousness  · Vomiting blood  Date Last Reviewed: 6/24/2015  © 6861-9197 [employer]. [address] [place] [address]. All rights reserved. This information is not intended as a substitute for professional medical care. Always follow your  healthcare professional's instructions.

## 2020-09-24 ENCOUNTER — PATIENT MESSAGE (OUTPATIENT)
Dept: PAIN MEDICINE | Facility: CLINIC | Age: 80
End: 2020-09-24

## 2020-09-24 ENCOUNTER — OFFICE VISIT (OUTPATIENT)
Dept: CARDIOLOGY | Facility: CLINIC | Age: 80
End: 2020-09-24
Payer: MEDICARE

## 2020-09-24 VITALS
OXYGEN SATURATION: 97 % | BODY MASS INDEX: 31.16 KG/M2 | HEART RATE: 72 BPM | DIASTOLIC BLOOD PRESSURE: 74 MMHG | WEIGHT: 211 LBS | SYSTOLIC BLOOD PRESSURE: 126 MMHG

## 2020-09-24 DIAGNOSIS — Z95.810 ICD (IMPLANTABLE CARDIOVERTER-DEFIBRILLATOR) IN PLACE: ICD-10-CM

## 2020-09-24 DIAGNOSIS — E78.2 MIXED HYPERLIPIDEMIA: ICD-10-CM

## 2020-09-24 DIAGNOSIS — I10 ESSENTIAL HYPERTENSION: ICD-10-CM

## 2020-09-24 DIAGNOSIS — I25.2 MI, OLD: ICD-10-CM

## 2020-09-24 DIAGNOSIS — I25.118 CORONARY ARTERY DISEASE OF NATIVE ARTERY OF NATIVE HEART WITH STABLE ANGINA PECTORIS: ICD-10-CM

## 2020-09-24 DIAGNOSIS — Z95.1 S/P CABG X 3: ICD-10-CM

## 2020-09-24 DIAGNOSIS — I50.42 CHRONIC COMBINED SYSTOLIC AND DIASTOLIC CONGESTIVE HEART FAILURE: ICD-10-CM

## 2020-09-24 DIAGNOSIS — I48.0 PAROXYSMAL ATRIAL FIBRILLATION: Primary | ICD-10-CM

## 2020-09-24 DIAGNOSIS — D50.0 IRON DEFICIENCY ANEMIA DUE TO CHRONIC BLOOD LOSS: ICD-10-CM

## 2020-09-24 PROCEDURE — 99999 PR PBB SHADOW E&M-EST. PATIENT-LVL V: ICD-10-PCS | Mod: PBBFAC,HCNC,, | Performed by: INTERNAL MEDICINE

## 2020-09-24 PROCEDURE — 1101F PT FALLS ASSESS-DOCD LE1/YR: CPT | Mod: HCNC,CPTII,S$GLB, | Performed by: INTERNAL MEDICINE

## 2020-09-24 PROCEDURE — 3078F PR MOST RECENT DIASTOLIC BLOOD PRESSURE < 80 MM HG: ICD-10-PCS | Mod: HCNC,CPTII,S$GLB, | Performed by: INTERNAL MEDICINE

## 2020-09-24 PROCEDURE — 1159F PR MEDICATION LIST DOCUMENTED IN MEDICAL RECORD: ICD-10-PCS | Mod: HCNC,S$GLB,, | Performed by: INTERNAL MEDICINE

## 2020-09-24 PROCEDURE — 99999 PR PBB SHADOW E&M-EST. PATIENT-LVL V: CPT | Mod: PBBFAC,HCNC,, | Performed by: INTERNAL MEDICINE

## 2020-09-24 PROCEDURE — 1126F AMNT PAIN NOTED NONE PRSNT: CPT | Mod: HCNC,S$GLB,, | Performed by: INTERNAL MEDICINE

## 2020-09-24 PROCEDURE — 1159F MED LIST DOCD IN RCRD: CPT | Mod: HCNC,S$GLB,, | Performed by: INTERNAL MEDICINE

## 2020-09-24 PROCEDURE — 3074F PR MOST RECENT SYSTOLIC BLOOD PRESSURE < 130 MM HG: ICD-10-PCS | Mod: HCNC,CPTII,S$GLB, | Performed by: INTERNAL MEDICINE

## 2020-09-24 PROCEDURE — 99214 OFFICE O/P EST MOD 30 MIN: CPT | Mod: HCNC,S$GLB,, | Performed by: INTERNAL MEDICINE

## 2020-09-24 PROCEDURE — 1126F PR PAIN SEVERITY QUANTIFIED, NO PAIN PRESENT: ICD-10-PCS | Mod: HCNC,S$GLB,, | Performed by: INTERNAL MEDICINE

## 2020-09-24 PROCEDURE — 3074F SYST BP LT 130 MM HG: CPT | Mod: HCNC,CPTII,S$GLB, | Performed by: INTERNAL MEDICINE

## 2020-09-24 PROCEDURE — 3078F DIAST BP <80 MM HG: CPT | Mod: HCNC,CPTII,S$GLB, | Performed by: INTERNAL MEDICINE

## 2020-09-24 PROCEDURE — 1101F PR PT FALLS ASSESS DOC 0-1 FALLS W/OUT INJ PAST YR: ICD-10-PCS | Mod: HCNC,CPTII,S$GLB, | Performed by: INTERNAL MEDICINE

## 2020-09-24 PROCEDURE — 99214 PR OFFICE/OUTPT VISIT, EST, LEVL IV, 30-39 MIN: ICD-10-PCS | Mod: HCNC,S$GLB,, | Performed by: INTERNAL MEDICINE

## 2020-09-24 NOTE — PROGRESS NOTES
Subjective:   Patient ID:  Jake Ortiz is a 80 y.o. male who presents for cardiac consult of Follow-up          Follow-up  Pertinent negatives include no chest pain.     The patient came in today for cardiac consult of Follow-up    Jake Ortiz is a 80 y.o. 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    4/21/20  Pt seen at [facility] last by CARMELINA Spencer 9/18/19. Overall feels great for the most part. He has been started to gain some water weight in the past, he had been on Entreso by Dr. Menendez, lost 173 lbs. He had paracentesis in past was told due to CHF. His weight started coming back up to 208 lbs and then was gaining weight and he has doubled his meds - Entresto. Otherwise feels well has good energy. He changed here due to insurance.   BP - 108/67, pulse 62, oxygen 98%, weight 209 lbs, blood sugar 127  Reviewed prior chart from [facility]    5/22/20  BNP was neg after last visit, Cr slightly increased with BUN elevated told to take lasix daily and PRN extra. He saw Dr. Holley yesterday, was given Flonase breathing improved.   /120s systolics, 67 diastolic, pulse 60s. . Overall doing will. Needs device clinic.     7/16/20  ECHO with EF 50%, mild to mod MR/TR. Pt had trouble getting Entresto filled due to being in donut hole but patient assistance form filled out.   He has significant back pain. He is euvolemic, no CP/SOB.   ECG - bi V paced    9/24/20 - hosp f/u  He was admitted for lower GI bleed and acute blood loss anemia. Initial H/H of 5.1/16.9.  Patient was transfused 2 units of PRBCs. GI consulted. Will hold anticoagulations. Today H/H 7.4/23.3. Case  discussed with Dr. Farmer, will need  EGD and colonoscopy to evaluate further and given recent anticoagulation to decrease the risk of bleeding from the procedures will plan for the procedures in 5  days. As of 9/20 pt had a bowel movement with red blood yesterday morning, no further episodes reported. This morning H/H 6.7/21.5, will transfuse 2 units of PRBCs. Plans for EGD/colonoscopy on Tuesday. As of 9/21 no active bleeding noted. H/H 9.0/28.0. Continue to monitor. Plan for procedure tomorrow. 09/22: Patient had EGD that showed gastritis which was biopsied. Discussed with GI who recommends dc home today, restart anti coagulation tomorrow, and f/u OP for video capsule study. GI will arrange OP f/u and call patient. New Rx for Pantoprazole 40mg daily sent to pharmacy.     He has restarted taking Eliqis    Patient feels no chest pain, no sob, no leg swelling, no PND, no palpitation, no dizziness, no syncope, no CNS symptoms.    Patient has fairly good exercise tolerance.    Patient is compliant with medications.    Results for orders placed during the hospital encounter of 06/01/20   Echo Color Flow Doppler? Yes    Narrative · Concentric left ventricular hypertrophy.  · Left ventricular systolic function. The estimated ejection fraction is   50%.  · Indeterminate left ventricular diastolic function.  · Severe left atrial enlargement.  · Mild-to-moderate mitral regurgitation.  · Mild to moderate tricuspid regurgitation.            Past Medical History:   Diagnosis Date    Abnormal PFT     Anemia     Arthritis     Atrial fibrillation 10/19/2017    Back pain     Congestive heart failure 3/5/2018    Coronary artery disease     Diabetes mellitus 01/2018     am 02/27/2018    DM (diabetes mellitus) 2018     am 06/23/2020    Hyperlipemia     Hypertension     Myocardial infarction     Obesity     NASREEN (obstructive sleep apnea) 6/5/2018    Prostate cancer 2015    Tobacco dependence     Type 2 diabetes mellitus        Past Surgical History:   Procedure Laterality Date    COLONOSCOPY N/A 9/22/2020    Procedure: COLONOSCOPY;  Surgeon: Javier Farmer MD;  Location: [facility];   Service: Endoscopy;  Laterality: N/A;    CORONARY ARTERY BYPASS GRAFT  1987    EPIDURAL STEROID INJECTION N/A 2019    Procedure: Lumbar L5/S1 IL XUAN;  Surgeon: Tacho Trivedi MD;  Location: [facility];  Service: Pain Management;  Laterality: N/A;    EPIDURAL STEROID INJECTION N/A 2020    Procedure: Lumbar L5/S1 IL XUAN;  Surgeon: Tacho Trivedi MD;  Location: [facility];  Service: Pain Management;  Laterality: N/A;    EPIDURAL STEROID INJECTION N/A 2/10/2020    Procedure: Caudal XUAN;  Surgeon: Tacho Trivedi MD;  Location: [facility];  Service: Pain Management;  Laterality: N/A;    ESOPHAGOGASTRODUODENOSCOPY N/A 2020    Procedure: EGD (ESOPHAGOGASTRODUODENOSCOPY);  Surgeon: Javier Farmer MD;  Location[facility];  Service: Endoscopy;  Laterality: N/A;    PROSTATE SURGERY      prostate radiation    SPINE SURGERY      fusion    TONSILLECTOMY         Social History     Tobacco Use    Smoking status: Former Smoker     Packs/day: 1.50     Years: 20.00     Pack years: 30.00     Types: Cigarettes     Start date:      Quit date:      Years since quittin.7    Smokeless tobacco: Never Used   Substance Use Topics    Alcohol use: No     Frequency: Never     Binge frequency: Never    Drug use: No       Family History   Problem Relation Age of Onset    Heart attack Mother     Diabetes Mother     Heart disease Mother     Cataracts Mother     Stroke Father     Heart disease Father     Heart disease Brother        Patient's Medications   New Prescriptions    No medications on file   Previous Medications    ACETAMINOPHEN (TYLENOL) 500 MG TABLET    Take 500 mg by mouth every 6 (six) hours as needed for Pain.    APIXABAN (ELIQUIS) 5 MG TAB    Take 1 tablet (5 mg total) by mouth 2 (two) times daily.    ASPIRIN (ECOTRIN) 81 MG EC TABLET    Take 81 mg by mouth.    ATORVASTATIN (LIPITOR) 80 MG TABLET    TK 1 T PO D    BLOOD SUGAR DIAGNOSTIC STRP    Check blood  glucose levels daily in the AM fasting and 1-2 times more daily.    BLOOD-GLUCOSE METER KIT    Use as instructed    CHOLECALCIFEROL, VITAMIN D3, (VITAMIN D3) 1,000 UNIT CAPSULE    Take 5,000 Units by mouth once daily.    CLONAZEPAM (KLONOPIN) 1 MG TABLET    Take 1 tablet (1 mg total) by mouth every evening.    ENTRESTO 24-26 MG PER TABLET        FISH OIL-OMEGA-3 FATTY ACIDS 300-1,000 MG CAPSULE    Take 1 capsule by mouth once daily.    FLUTICASONE PROPIONATE (FLONASE) 50 MCG/ACTUATION NASAL SPRAY    2 sprays (100 mcg total) by Each Nostril route once daily.    FUROSEMIDE (LASIX) 40 MG TABLET    Take 1 tablet (40 mg total) by mouth once daily. Take extra dose as needed for swelling or weight gain 3 lbs    GABAPENTIN (NEURONTIN) 300 MG CAPSULE    Take 2 capsules (600 mg total) by mouth every evening. Can take 3 caps qhs or tid if tolerated, may cause drowsiness    LANCETS MISC    Check blood glucose levels daily in the AM fasting and 1-2 times more daily.    LEVOCETIRIZINE (XYZAL) 5 MG TABLET    Take 1 tablet (5 mg total) by mouth every evening.    MULTIVITAMIN CAPSULE    Take 1 capsule by mouth once daily.    NITROGLYCERIN (NITROSTAT) 0.3 MG SL TABLET    PLACE 1 TABLET UNDER THE TONGUE EVERY 5 MINUTES AS NEEDED FOR CHEST PAIN AS DIRECTED    PANTOPRAZOLE (PROTONIX) 40 MG TABLET    Take 1 tablet (40 mg total) by mouth once daily.   Modified Medications    No medications on file   Discontinued Medications    CLOPIDOGREL (PLAVIX) 75 MG TABLET    Take 75 mg by mouth once daily.       Review of Systems   Constitutional: Negative.    HENT: Negative.    Eyes: Negative.    Respiratory: Negative.    Cardiovascular: Negative.  Negative for chest pain.   Gastrointestinal: Negative.    Genitourinary: Negative.    Musculoskeletal: Negative.    Skin: Negative.    Neurological: Negative.    Endo/Heme/Allergies: Negative.    Psychiatric/Behavioral: Negative.    All 12 systems otherwise negative.      Wt Readings from Last 3  Encounters:   09/24/20 95.7 kg (210 lb 15.7 oz)   09/21/20 94.8 kg (208 lb 15.9 oz)   07/22/20 97.1 kg (214 lb 2.8 oz)     Temp Readings from Last 3 Encounters:   09/22/20 97.7 °F (36.5 °C)   05/20/20 98.7 °F (37.1 °C)   02/10/20 97.9 °F (36.6 °C) (Temporal)     BP Readings from Last 3 Encounters:   09/24/20 126/74   09/22/20 137/63   07/22/20 133/76     Pulse Readings from Last 3 Encounters:   09/24/20 72   09/22/20 63   07/22/20 68       /74 (BP Location: Left arm, Patient Position: Sitting)   Pulse 72   Wt 95.7 kg (210 lb 15.7 oz)   SpO2 97%   BMI 31.16 kg/m²     Objective:   Physical Exam   Constitutional: He is oriented to person, place, and time. He appears well-developed and well-nourished. No distress.   HENT:   Head: Normocephalic and atraumatic.   Nose: Nose normal.   Mouth/Throat: Oropharynx is clear and moist.   Eyes: Conjunctivae and EOM are normal. Right eye exhibits no discharge. Left eye exhibits no discharge. No scleral icterus.   Neck: Normal range of motion. Neck supple. No JVD present. No thyromegaly present.   Cardiovascular: Normal rate, regular rhythm, S1 normal and S2 normal. Exam reveals no gallop, no S3, no S4 and no friction rub.   Murmur heard.  Pulmonary/Chest: Effort normal and breath sounds normal. No stridor. No respiratory distress. He has no wheezes. He has no rales. He exhibits no tenderness.   Abdominal: Soft. Bowel sounds are normal. He exhibits no distension and no mass. There is no abdominal tenderness. There is no rebound.   Genitourinary:    Genitourinary Comments: Deferred     Musculoskeletal: Normal range of motion.         General: No tenderness, deformity or edema.   Lymphadenopathy:     He has no cervical adenopathy.   Neurological: He is alert and oriented to person, place, and time. He exhibits normal muscle tone. Coordination normal.   Skin: Skin is warm and dry. No rash noted. He is not diaphoretic. No erythema. No pallor.   Psychiatric: He has a normal  mood and affect. His behavior is normal. Judgment and thought content normal.   Nursing note and vitals reviewed.      Lab Results   Component Value Date     09/22/2020    K 4.2 09/22/2020     09/22/2020    CO2 21 (L) 09/22/2020    BUN 22 09/22/2020    CREATININE 1.5 (H) 09/22/2020     09/22/2020    HGBA1C 6.1 (H) 09/17/2020    MG 1.8 04/27/2020    AST 11 09/18/2020    ALT 7 (L) 09/18/2020    ALBUMIN 3.7 09/18/2020    PROT 6.7 09/18/2020    BILITOT 0.2 09/18/2020    WBC 5.36 09/22/2020    HGB 9.0 (L) 09/22/2020    HCT 28.7 (L) 09/22/2020    MCV 92 09/22/2020     09/22/2020    INR 1.2 09/18/2020    TSH 2.181 01/12/2018    CHOL 171 03/20/2020    HDL 38 (L) 03/20/2020    LDLCALC 75.0 03/20/2020    TRIG 290 (H) 03/20/2020    BNP 85 04/27/2020     Assessment:      1. Paroxysmal atrial fibrillation    2. S/P CABG x 3    3. MI, old    4. Coronary artery disease of native artery of native heart with stable angina pectoris    5. Mixed hyperlipidemia    6. Essential hypertension    7. Chronic combined systolic and diastolic congestive heart failure    8. ICD (implantable cardioverter-defibrillator) in place    9. Iron deficiency anemia due to chronic blood loss        Plan:   1. CAD s/p CABG, old MI  - cont meds - DC plavix  - stable    2. HFPEF ith TR/MR, improved EF  - cont meds - cont lasix to 40mg daily  - cont to monitor valve disease  - cont Entresto    3. PAF had episode of PNA  - cont meds - Eliquis    4. NASREEN  - cont CPAP    5. Restricive/obstrucive lung disease  - cont tx per PCP/pulm    6. DM2 6.0   - cont tx per PCP    7. AVB s/p ICD  - cont to monitor  - refer to device clinic    8. Anemia sec to GIB  - f/u with GI    Restarted Eliquis, on asa, stop plavix. ER eval if bleeding returns, follow up with GI asap    Thank you for allowing me to participate in this patient's care. Please do not hesitate to contact me with any questions or concerns. Consult note has been forwarded to the  referral physician.

## 2020-09-25 LAB
FINAL PATHOLOGIC DIAGNOSIS: NORMAL
GROSS: NORMAL

## 2020-09-28 NOTE — PHYSICIAN QUERY
PT Name: Jake Ortiz  MR #: [id number]     Etiology of Condition Clarification      CDS: Laverne CARIAS,RN        Contact information:[email or web address]  This form is a permanent document in the medical record.     Query Date: September 28, 2020    By submitting this query, we are merely seeking further clarification of documentation.  Please utilize your independent clinical judgment when addressing the question(s) below.     The Medical Record contains the following:    Clinical Information Location in Medical Record   Hgb: 5.1-->6.3--->7.1----->7.4----->6.7-->9.0------>9.0  Hct: 16.9->20.6-->22.4-->23.3--->21.5-->28.0-->28.7    Jake Ortiz is a 80 y.o. male patient with a PMHx of Afib, CHF, CAD, DM, HLD, HTN, and MI who presents to the Emergency Department for evaluation of rectal bleeding which onset gradually 3-4 days ago. Reports maroon-colored blood in stool.     Reason for Consult: The primary encounter diagnosis was Rectal bleeding. Diagnoses of Lower GI bleed and Acute blood loss anemia were also pertinent to this visit.   Rectal bleeding  Lower GI bleed  Acute blood loss anemia    Colonoscopy:  Findings:   The perianal and digital rectal examinations were normal. A 3 mm polyp was found in the sigmoid colon. The polyp was sessile.   The polyp was removed with a cold snare. Resection and retrieval were complete.   Non-bleeding internal hemorrhoids were found during retroflexion. The hemorrhoids were moderate.    Findings:   The Z-line was regular and was found 41 cm from the incisors.   The examined esophagus was normal.   Scattered moderate inflammation characterized by erosions and   erythema was found in the gastric antrum. Biopsies were taken with a   cold forceps for Helicobacter pylori testing.   The exam of the stomach was otherwise normal.   The cardia and gastric fundus were normal on retroflexion.   The duodenal bulb, second portion of the duodenum and third portion   of the duodenum  were normal.    Final Pathologic Diagnosis  1. Stomach (biopsy):  - Benign antral type mucosa with focal acute gastritis  - Benign oxyntic type mucosa with no diagnostic abnormality  - No intestinal metaplasia, dysplasia or malignancy, multiple levels examined  - H. pylori immunostain is negative; H. pylori stain with appropriate controls  2. Polyp, sigmoid colon (biopsy):  - Benign colonic mucosa with changes consistent with inflammatory   polyp/ prolapse polyp  - No dysplasia or malignancy   9/18------->9/22 Labs         9/18/20 [facility] Medicine H&P            9/18/20 Gastroenterology Consults/[facility]                    9/22/20 Colonoscopy                  9/22/20 Upper GI endoscopy                    9/25/20 Final Pathologic Report     Please document your best medical opinion regarding the etiology of  Lower GI bleed__ _____?       [   ]   Acute gastritis with bleeding   [   ] Inflammatory polyps of colon without complication   [x   ] Other etiology (please specify):_Small bowel gastrointestinal bleeding__________________   [  ] Clinically Undetermined     Please document in your progress notes daily for the duration of treatment, until resolved, and include in your discharge summary.

## 2020-09-29 ENCOUNTER — TELEPHONE (OUTPATIENT)
Dept: PAIN MEDICINE | Facility: CLINIC | Age: 80
End: 2020-09-29

## 2020-09-29 ENCOUNTER — PATIENT MESSAGE (OUTPATIENT)
Dept: CARDIOLOGY | Facility: CLINIC | Age: 80
End: 2020-09-29

## 2020-09-29 NOTE — TELEPHONE ENCOUNTER
Tried to call to confirm appt on 09/30 with  . No answer. Left vm for pt to cancel or r/s if needed   Oh Croft MA  [facility]   [facility]

## 2020-09-30 ENCOUNTER — LAB VISIT (OUTPATIENT)
Dept: LAB | Facility: HOSPITAL | Age: 80
End: 2020-09-30
Attending: FAMILY MEDICINE
Payer: MEDICARE

## 2020-09-30 ENCOUNTER — OFFICE VISIT (OUTPATIENT)
Dept: PAIN MEDICINE | Facility: CLINIC | Age: 80
End: 2020-09-30
Payer: MEDICARE

## 2020-09-30 ENCOUNTER — OFFICE VISIT (OUTPATIENT)
Dept: FAMILY MEDICINE | Facility: CLINIC | Age: 80
End: 2020-09-30
Payer: MEDICARE

## 2020-09-30 VITALS
SYSTOLIC BLOOD PRESSURE: 120 MMHG | DIASTOLIC BLOOD PRESSURE: 62 MMHG | HEIGHT: 69 IN | WEIGHT: 215.5 LBS | OXYGEN SATURATION: 97 % | TEMPERATURE: 97 F | BODY MASS INDEX: 31.92 KG/M2 | HEART RATE: 85 BPM | RESPIRATION RATE: 16 BRPM

## 2020-09-30 VITALS
HEIGHT: 69 IN | WEIGHT: 215.25 LBS | DIASTOLIC BLOOD PRESSURE: 71 MMHG | HEART RATE: 52 BPM | SYSTOLIC BLOOD PRESSURE: 140 MMHG | BODY MASS INDEX: 31.88 KG/M2

## 2020-09-30 DIAGNOSIS — I48.0 PAROXYSMAL ATRIAL FIBRILLATION: ICD-10-CM

## 2020-09-30 DIAGNOSIS — D62 ACUTE BLOOD LOSS ANEMIA: ICD-10-CM

## 2020-09-30 DIAGNOSIS — K92.2 LOWER GI BLEED: ICD-10-CM

## 2020-09-30 DIAGNOSIS — M47.816 LUMBAR SPONDYLOSIS: ICD-10-CM

## 2020-09-30 DIAGNOSIS — J43.9 MIXED RESTRICTIVE AND OBSTRUCTIVE LUNG DISEASE: ICD-10-CM

## 2020-09-30 DIAGNOSIS — J98.4 MIXED RESTRICTIVE AND OBSTRUCTIVE LUNG DISEASE: ICD-10-CM

## 2020-09-30 DIAGNOSIS — I50.9 CONGESTIVE HEART FAILURE, UNSPECIFIED HF CHRONICITY, UNSPECIFIED HEART FAILURE TYPE: ICD-10-CM

## 2020-09-30 DIAGNOSIS — I25.10 CORONARY ARTERY DISEASE INVOLVING NATIVE CORONARY ARTERY OF NATIVE HEART WITHOUT ANGINA PECTORIS: ICD-10-CM

## 2020-09-30 DIAGNOSIS — M47.816 SPONDYLOSIS WITHOUT MYELOPATHY OR RADICULOPATHY, LUMBAR REGION: Primary | ICD-10-CM

## 2020-09-30 DIAGNOSIS — K92.2 LOWER GI BLEED: Primary | ICD-10-CM

## 2020-09-30 DIAGNOSIS — N17.9 AKI (ACUTE KIDNEY INJURY): ICD-10-CM

## 2020-09-30 DIAGNOSIS — E78.2 MIXED HYPERLIPIDEMIA: ICD-10-CM

## 2020-09-30 DIAGNOSIS — M51.36 DDD (DEGENERATIVE DISC DISEASE), LUMBAR: ICD-10-CM

## 2020-09-30 DIAGNOSIS — Z95.1 S/P CABG X 3: ICD-10-CM

## 2020-09-30 DIAGNOSIS — E66.9 OBESITY (BMI 30-39.9): ICD-10-CM

## 2020-09-30 DIAGNOSIS — I10 ESSENTIAL HYPERTENSION: ICD-10-CM

## 2020-09-30 LAB
ANION GAP SERPL CALC-SCNC: 10 MMOL/L (ref 8–16)
BASOPHILS # BLD AUTO: 0.05 K/UL (ref 0–0.2)
BASOPHILS NFR BLD: 1.1 % (ref 0–1.9)
BUN SERPL-MCNC: 18 MG/DL (ref 8–23)
CALCIUM SERPL-MCNC: 8.8 MG/DL (ref 8.7–10.5)
CHLORIDE SERPL-SCNC: 106 MMOL/L (ref 95–110)
CO2 SERPL-SCNC: 25 MMOL/L (ref 23–29)
CREAT SERPL-MCNC: 1.4 MG/DL (ref 0.5–1.4)
DIFFERENTIAL METHOD: ABNORMAL
EOSINOPHIL # BLD AUTO: 0.1 K/UL (ref 0–0.5)
EOSINOPHIL NFR BLD: 1.7 % (ref 0–8)
ERYTHROCYTE [DISTWIDTH] IN BLOOD BY AUTOMATED COUNT: 14.7 % (ref 11.5–14.5)
EST. GFR  (AFRICAN AMERICAN): 55.2 ML/MIN/1.73 M^2
EST. GFR  (NON AFRICAN AMERICAN): 47.8 ML/MIN/1.73 M^2
GLUCOSE SERPL-MCNC: 148 MG/DL (ref 70–110)
HCT VFR BLD AUTO: 28.5 % (ref 40–54)
HGB BLD-MCNC: 8.4 G/DL (ref 14–18)
IMM GRANULOCYTES # BLD AUTO: 0.02 K/UL (ref 0–0.04)
IMM GRANULOCYTES NFR BLD AUTO: 0.4 % (ref 0–0.5)
LYMPHOCYTES # BLD AUTO: 0.6 K/UL (ref 1–4.8)
LYMPHOCYTES NFR BLD: 13.3 % (ref 18–48)
MCH RBC QN AUTO: 28.3 PG (ref 27–31)
MCHC RBC AUTO-ENTMCNC: 29.5 G/DL (ref 32–36)
MCV RBC AUTO: 96 FL (ref 82–98)
MONOCYTES # BLD AUTO: 0.4 K/UL (ref 0.3–1)
MONOCYTES NFR BLD: 9.2 % (ref 4–15)
NEUTROPHILS # BLD AUTO: 3.4 K/UL (ref 1.8–7.7)
NEUTROPHILS NFR BLD: 74.3 % (ref 38–73)
NRBC BLD-RTO: 0 /100 WBC
PLATELET # BLD AUTO: 320 K/UL (ref 150–350)
PMV BLD AUTO: 10.4 FL (ref 9.2–12.9)
POTASSIUM SERPL-SCNC: 3.8 MMOL/L (ref 3.5–5.1)
RBC # BLD AUTO: 2.97 M/UL (ref 4.6–6.2)
SODIUM SERPL-SCNC: 141 MMOL/L (ref 136–145)
WBC # BLD AUTO: 4.59 K/UL (ref 3.9–12.7)

## 2020-09-30 PROCEDURE — 85025 COMPLETE CBC W/AUTO DIFF WBC: CPT | Mod: HCNC

## 2020-09-30 PROCEDURE — 1101F PT FALLS ASSESS-DOCD LE1/YR: CPT | Mod: HCNC,CPTII,S$GLB, | Performed by: PHYSICAL MEDICINE & REHABILITATION

## 2020-09-30 PROCEDURE — 1126F PR PAIN SEVERITY QUANTIFIED, NO PAIN PRESENT: ICD-10-PCS | Mod: HCNC,S$GLB,, | Performed by: PHYSICAL MEDICINE & REHABILITATION

## 2020-09-30 PROCEDURE — 90694 VACC AIIV4 NO PRSRV 0.5ML IM: CPT | Mod: HCNC,S$GLB,, | Performed by: FAMILY MEDICINE

## 2020-09-30 PROCEDURE — 80048 BASIC METABOLIC PNL TOTAL CA: CPT | Mod: HCNC

## 2020-09-30 PROCEDURE — 99999 PR PBB SHADOW E&M-EST. PATIENT-LVL V: ICD-10-PCS | Mod: PBBFAC,HCNC,, | Performed by: FAMILY MEDICINE

## 2020-09-30 PROCEDURE — 3077F SYST BP >= 140 MM HG: CPT | Mod: HCNC,CPTII,S$GLB, | Performed by: PHYSICAL MEDICINE & REHABILITATION

## 2020-09-30 PROCEDURE — 3078F PR MOST RECENT DIASTOLIC BLOOD PRESSURE < 80 MM HG: ICD-10-PCS | Mod: HCNC,CPTII,S$GLB, | Performed by: PHYSICAL MEDICINE & REHABILITATION

## 2020-09-30 PROCEDURE — 3077F PR MOST RECENT SYSTOLIC BLOOD PRESSURE >= 140 MM HG: ICD-10-PCS | Mod: HCNC,CPTII,S$GLB, | Performed by: PHYSICAL MEDICINE & REHABILITATION

## 2020-09-30 PROCEDURE — 99999 PR PBB SHADOW E&M-EST. PATIENT-LVL V: CPT | Mod: PBBFAC,HCNC,, | Performed by: PHYSICAL MEDICINE & REHABILITATION

## 2020-09-30 PROCEDURE — 99214 PR OFFICE/OUTPT VISIT, EST, LEVL IV, 30-39 MIN: ICD-10-PCS | Mod: HCNC,S$GLB,, | Performed by: PHYSICAL MEDICINE & REHABILITATION

## 2020-09-30 PROCEDURE — 90694 FLU VACCINE - QUADRIVALENT - ADJUVANTED: ICD-10-PCS | Mod: HCNC,S$GLB,, | Performed by: FAMILY MEDICINE

## 2020-09-30 PROCEDURE — 1159F PR MEDICATION LIST DOCUMENTED IN MEDICAL RECORD: ICD-10-PCS | Mod: HCNC,S$GLB,, | Performed by: PHYSICAL MEDICINE & REHABILITATION

## 2020-09-30 PROCEDURE — 99495 TRANSJ CARE MGMT MOD F2F 14D: CPT | Mod: HCNC,25,S$GLB, | Performed by: FAMILY MEDICINE

## 2020-09-30 PROCEDURE — 99499 UNLISTED E&M SERVICE: CPT | Mod: HCNC,S$GLB,, | Performed by: FAMILY MEDICINE

## 2020-09-30 PROCEDURE — 1159F MED LIST DOCD IN RCRD: CPT | Mod: HCNC,S$GLB,, | Performed by: PHYSICAL MEDICINE & REHABILITATION

## 2020-09-30 PROCEDURE — 36415 COLL VENOUS BLD VENIPUNCTURE: CPT | Mod: HCNC,PO

## 2020-09-30 PROCEDURE — 99214 OFFICE O/P EST MOD 30 MIN: CPT | Mod: HCNC,S$GLB,, | Performed by: PHYSICAL MEDICINE & REHABILITATION

## 2020-09-30 PROCEDURE — 99999 PR PBB SHADOW E&M-EST. PATIENT-LVL V: CPT | Mod: PBBFAC,HCNC,, | Performed by: FAMILY MEDICINE

## 2020-09-30 PROCEDURE — G0008 ADMIN INFLUENZA VIRUS VAC: HCPCS | Mod: HCNC,S$GLB,, | Performed by: FAMILY MEDICINE

## 2020-09-30 PROCEDURE — 1126F AMNT PAIN NOTED NONE PRSNT: CPT | Mod: HCNC,S$GLB,, | Performed by: PHYSICAL MEDICINE & REHABILITATION

## 2020-09-30 PROCEDURE — 99495 TCM SERVICES (MODERATE COMPLEXITY): ICD-10-PCS | Mod: HCNC,25,S$GLB, | Performed by: FAMILY MEDICINE

## 2020-09-30 PROCEDURE — 3078F DIAST BP <80 MM HG: CPT | Mod: HCNC,CPTII,S$GLB, | Performed by: PHYSICAL MEDICINE & REHABILITATION

## 2020-09-30 PROCEDURE — G0008 FLU VACCINE - QUADRIVALENT - ADJUVANTED: ICD-10-PCS | Mod: HCNC,S$GLB,, | Performed by: FAMILY MEDICINE

## 2020-09-30 PROCEDURE — 99999 PR PBB SHADOW E&M-EST. PATIENT-LVL V: ICD-10-PCS | Mod: PBBFAC,HCNC,, | Performed by: PHYSICAL MEDICINE & REHABILITATION

## 2020-09-30 PROCEDURE — 99499 RISK ADDL DX/OHS AUDIT: ICD-10-PCS | Mod: HCNC,S$GLB,, | Performed by: FAMILY MEDICINE

## 2020-09-30 PROCEDURE — 1101F PR PT FALLS ASSESS DOC 0-1 FALLS W/OUT INJ PAST YR: ICD-10-PCS | Mod: HCNC,CPTII,S$GLB, | Performed by: PHYSICAL MEDICINE & REHABILITATION

## 2020-09-30 NOTE — PROGRESS NOTES
Established Patient Chronic Pain Note (Follow up visit)    Chief Complaint:   Chief Complaint   Patient presents with    Back Pain     Lower back pain        SUBJECTIVE:  Jake Ortiz is a 80 y.o. male who presents to the clinic for a follow-up appointment for Lower back pain.  At the last visit, Tramadol discontinue due to his ineffectiveness.  He also reports that he was not interested in long-term medication management.  A physical therapy prescription was ordered for further evaluation and treatment of his lower back pain..  He has attended a total of 8 sessions with physical therapy and reports minimal benefit..  He is interested in finding an acupuncturist as this has worked well in the past, but has not had much luck and finding a quality acupuncturist in the area.  Since the last visit, Jake Ortiz states the pain has been persistent.  Current pain intensity is 0/10 while seated and resting, but pain can increase to 10/10 with increased levels of activity.      Patient denies night fever/night sweats, urinary incontinence, bowel incontinence, significant weight loss, significant motor weakness and loss of sensations.      Pain Disability Index Review:  Last 3 PDI Scores 3/10/2020 1/31/2020   Pain Disability Index (PDI) 33 27     Interval history (07/22/2020):  Jake Ortiz is a 80 y.o. male who presents to the clinic for a follow-up appointment for Lower back pain.  At the last visit, and opioid pain contract was completed in started him on tramadol 50 mg Q 12 p.r.n. pain.  Since the last visit, Jake Ortiz states the pain has been persistent.  He reports that the tramadol did not provide significant pain relief and is not interested in medication management at this time.  Current pain intensity is 0/10.  He reports that his pain gets up to about a 5/10 with increased movement and localizes pain to the lower lumbosacral area.  He denies any radicular pain down the lower extremities.    Interval  history (06/24/2020): aJke Ortiz is a 80 y.o. male who presents to the clinic for a follow-up appointment for Lower back pain. Since the last visit, Jake Ortiz states the pain has been persistant. Current pain intensity is 0/10.  He reports that his pain gets up to about a 5/10 with increased movement and localizes pain to the lower lumbosacral area.  He denies any radicular pain down the lower extremities.    Interval History (3/10/2020): Patient was seen on 2/10/20. At that time he underwent caudal XUAN.  The patient reports that he is/was unchanged following the procedure.  he reports 15% pain relief.  The changes lasted 2 days.       Interval History (1/31/2020): S/p L5/S1 IL XUAN on 1/6/2020 with limited pain relief for a few days.      Interval History (12/17/19): Patient was seen on 11/22/19. At that time he underwent L5/S1 IL XUAN.  The patient reports that he is/was unchanged following the procedure.  he reports some pain relief for a few days.  He is very active normally and would like to start dancing again.      Initial History of Present Illness:   This patient is a 79 y.o. male who presents today complaining of the above noted pain/s. The patient describes the pain as follows.  Mr. Ortiz is a new patient to clinic with complaints of back pain. Currently rates his pain 0/10 describes was seen aching sensation which is worse with bending and standing and has been somewhat improved with acupuncture.  His symptoms started many years ago he has tried several different types of therapy including physical therapy, chiropractor, acupuncture, heating pad and ice packs with varying degrees benefit.  He has undergone lumbar surgery in the past however is unsure 1 exact was perform the surgery.  There is no evidence of fusion on x-ray in all disc appeared to be intact. He has undergone medial branch blocks and radiofrequency ablation at an outside pain clinic in April 2019 however he reports that he has  received 0 benefit from these procedures.  He has been using [employer] topical cream on lumbar spine which does provide some benefit.  He finds his symptoms are worse with activity such as standing for long periods to wash dishes and cleaning house while his symptoms are completely resolved with sitting and rest.  He denies having any rib radiating symptoms and denies having bowel bladder difficulty. He has used extra-strength Tylenol in the past with no benefit.  He has completed physical therapy in the past and in performs a pretty in depth exercise routine on a daily basis.     Previous Therapy:  Medications:  Extra-strength Tylenol, Klonopin  Injections:  - series of 3 injections (what sounds like ESIs) about 30 years ago with relief  - Lumbar Medial Branch Blocks and Lumbar Radiofrequency Ablation with limited relief  - L5/S1 IL XUAN on 11/22/19 with some pain relief for a few days  - L5/S1 IL XUAN on 1/6/2020 with limited pain relief for a few days (although noticed medication didn't spread well after looking through images with Dr. Trivedi)  - caudal XUAN on 2/10/20 with 15% pain relief x 2 days  Surgeries:  Lumbar surgery with Dr. Powers with complications with staph infection causing 2 subsequent surgeries       Physical Therapy:  Yes      :       Imaging:   CT Lumbar Spine Without Contrast     Narrative COMPARISON:  Lumbar spine MRI performed on 06/08/2011 and lumbar spine radiographs on 08/08/2011  FINDINGS:  Since the prior MRI examination, development complete osseous fusion the L2 and L3 vertebral bodies.  Laminectomy/laminotomy deformities from L2 through L5.Minimal retrolisthesis at L3 with respect to L4 measuring approximately 4 mm.  At T12-L1 there is moderate degenerative disc disease with vacuum disc phenomenon.  Circumferential disc bulge with mild bilateral facet DJD that result in mild bilateral neural foraminal narrowing and mild spinal canal stenosis.  At L1-L2, there is no significant  neural foraminal narrowing or spinal canal stenosis.  At L2-L3, no significant neural foraminal narrowing or spinal canal stenosis.  At L3-L4, there is moderate degenerative disc disease with vacuum disc phenomenon and central disc osteophyte complex.  Indents facet DJD contributes to moderate bilateral neural foraminal narrowing.  At L4-5, there is severe degenerative disc disease with vacuum disc phenomenon.  Advanced facet DJD contributes to moderate left and severe right neural foraminal narrowing is.  At L5-S1, there is mild bilateral neural foraminal narrowing secondary to facet DJD.  Extensive postoperative stranding throughout the posterior.  Spinal tissues.  Ascites is demonstrated in the abdomen.     Impression Multilevel postoperative and degenerative changes as discussed in detail above.  All CT scans at this facility are performed  using dose modulation techniques as appropriate to performed exam including the following:  automated exposure control; adjustment of mA and/or kV according to the patients size (this includes techniques or standardized protocols for targeted exams where dose is matched to indication/reason for exam: i.e. extremities or head);  iterative reconstruction technique.           Results for orders placed in visit on 03/03/11   MRI Lumbar Spine Without Contrast     Narrative RESULTS: THIS STUDY DEMONSTRATES SEVERE DEGENERATIVE END PLATE CHANGES AT L4-5 AND L2-3.  THERE IS A LARGE ANTERIOR OSTEOPHYTE PROJECTING OFF THE END PLATES OF L3, L2, AND L4.  THERE ARE ALSO PROMINENT DISC BULGES AT L4-5, L3-4, AND L2-3.  THIS STUDY DEMONSTRATES MULTILEVEL FACET ARTHROPATHY.    IMPRESSION: PROMINENT DEGENERATIVE DISC DISEASE AS DESCRIBED IN THE ABOVE PARAGRAPH. THIS STUDY ALSO DEMONSTRATES PROMINENT RIGHT NEURAL FORAMINAL NARROWING AT L4-5 AND LEFT NEURAL FORAMINAL NARROWING AT L4-5.            Results for orders placed during the hospital encounter of 06/08/11   MRI Lumbar Spine W WO Cont      Narrative RESULTS:  Indication:     Back pain patient's had extensive operative intervention   with laminectomy starting at L3 and extending to mid L4. There also   appears to be a right laminectomy at L4-L5. Multiple pedicle screws   apparently have been removed.  L1-L2 level unremarkable.  L2-L3 disc is narrowed there is mild spondylosis with minor facet   arthropathy present. There is some posterior paravertebral edema present   right greater than left extending from right facet lesion of L2-L3. There   is mild central stenosis present but the L2 nerve roots exit without   contact or distortion. There screw artifacts present in the pedicles at   L2 and L3-L4 and L5.  L3-L4 disc is narrowed there is mild spondylosis patient's had   laminectomy there is extensive para spinous muscular edema with fluid   along the left lamina and paraspinous region is also considerable loss of   fat plane within the paravertebral musculature of the spine from L1-L2   down to sacral region.  L4-L5 level is obliterated right laminectomy is present is extensive   edema and loss of fat planes within the paravertebral musculature and   around the posterior and lateral thecal sac.  The L5-S1 disc is intact there is facet arthropathy present.  Postcontrast study demonstrates diffuse enhancement of the  paravertebral   soft tissues starting at approximately the T12 and extending to the   sacral level.  There is diffuse enhancement of the L2-L3 disc with enhancing tissues of   the posterior paraspinous region and minor facet joint enhancement. There   is also enhancement of the posterior epidural region resulting in some   distortion of the dorsal lateral thecal sac, left greater than right.  The L3-L4 level demonstrates minimal central and left-sided enhancement   of the disc with extensive  enhancement of the paraspinous elements with   enhancement of the posterior and lateral epidural canal. No intradural   enhancement is noted. There is  some enhancement around the right residual   facet . A portion of the left facet complex is absent..  The L4-L5 level again demonstrates comparable diffuse paraspinous   enhancement and enhancement surrounding the thecal sac within the central   canal. There is enhancement of the bony elements including the pedicles   and facet complex.  L5-S1 demonstrate some  facet enhancement and posterior perithecal   enhancement  IMPRESSION:       Patient's had extensive operative intervention with removal of   pedicle screws at L2-L3 L4 and L5. There is extensive enhancement of the   operative bed in the posterior epidural region as well is some   enhancement of the scar tissue around the thecal sac. There Is no   intradural or intramedullary enhancement although there is clumping of   the cauda and enhancement of the L2-L3 and L4-L5 disc. There is also   small amount enhancement of the posterior aspect of the L3-L4 disc.   enhancement consistent with gliosis. The various levels of bone   enhancement including the facet complex at L3 and L4 as well as portions   of the pedicles involving L5 involving and adjacent to the screw tracks..           Results for orders placed during the hospital encounter of 07/11/18   CT Lumbar Spine Without Contrast     Narrative COMPARISON:  Lumbar spine MRI performed on 06/08/2011 and lumbar spine radiographs on 08/08/2011  FINDINGS:  Since the prior MRI examination, development complete osseous fusion the L2 and L3 vertebral bodies.  Laminectomy/laminotomy deformities from L2 through L5.Minimal retrolisthesis at L3 with respect to L4 measuring approximately 4 mm.  At T12-L1 there is moderate degenerative disc disease with vacuum disc phenomenon.  Circumferential disc bulge with mild bilateral facet DJD that result in mild bilateral neural foraminal narrowing and mild spinal canal stenosis.  At L1-L2, there is no significant neural foraminal narrowing or spinal canal stenosis.  At L2-L3, no  significant neural foraminal narrowing or spinal canal stenosis.  At L3-L4, there is moderate degenerative disc disease with vacuum disc phenomenon and central disc osteophyte complex.  Indents facet DJD contributes to moderate bilateral neural foraminal narrowing.  At L4-5, there is severe degenerative disc disease with vacuum disc phenomenon.  Advanced facet DJD contributes to moderate left and severe right neural foraminal narrowing is.  At L5-S1, there is mild bilateral neural foraminal narrowing secondary to facet DJD.  Extensive postoperative stranding throughout the posterior.  Spinal tissues.  Ascites is demonstrated in the abdomen.     Impression Multilevel postoperative and degenerative changes as discussed in detail above.  All CT scans at this facility are performed  using dose modulation techniques as appropriate to performed exam including the following:  automated exposure control; adjustment of mA and/or kV according to the patients size (this includes techniques or standardized protocols for targeted exams where dose is matched to indication/reason for exam: i.e. extremities or head);  iterative reconstruction technique.           Results for orders placed during the hospital encounter of 09/25/19   X-Ray Lumbar Spine Complete 5 View     Narrative COMPARISON:  05/30/2018  FINDINGS:  Scoliosis remains.  Vertebral body heights and alignment are stable.  Multilevel advanced degenerative disc height loss and osteophyte formation noted.  Multilevel facet arthropathy present more prevalent at the lower lumbar spine.  No acute osseous abnormality appreciated.  Aorta iliac atherosclerotic vascular calcifications noted.     Impression Similar appearance of the spine.  Correlate with MRI exam as clinically indicated.           Results for orders placed during the hospital encounter of 05/30/18   X-Ray Lumbar Spine AP And Lateral     Narrative COMPARISON:  08/08/2011  FINDINGS:  Mild-to-moderate  dextroscoliosis of the lumbar spine noted.  There is suggestion of possible mild loss of vertebral body height at L5 which may potentially be projectional in nature and was not definitely seen on prior.  Age-indeterminate compression fracture not excluded.  Further characterization could be obtained with MRI as clinically warranted.  Moderate disc height loss from L2-3 through L3-4 appears unchanged.  Multilevel facet arthropathy suspected throughout the lumbar spine.  Posterior elements appear grossly intact. No acute fractures or subluxations are demonstrated.  Visualized osseous structures appear diffusely osteopenic.     Impression As above.           Results for orders placed during the hospital encounter of 08/26/14   X-Ray Cervical Spine AP And Lateral     Narrative Findings: Vertebral alignment is normal.  There is narrowing of the disk spaces and  anterior osteophyte formation C 3-7.  There are no compression fractures or acute abnormalities are seen.  Carotid calcifications are noted bilaterally.     Impression  Advanced degenerative change in the cervical spine.           PMHx,PSHx, Social history, and Family history:  I have reviewed the patient's medical, surgical, social, and family history in detail and updated the computerized patient record.      Review of patient's allergies indicates:  No Known Allergies    Current Outpatient Medications   Medication Sig    apixaban (ELIQUIS) 5 mg Tab Take 1 tablet (5 mg total) by mouth 2 (two) times daily.    aspirin (ECOTRIN) 81 MG EC tablet Take 81 mg by mouth.    atorvastatin (LIPITOR) 80 MG tablet TK 1 T PO D    blood sugar diagnostic Strp Check blood glucose levels daily in the AM fasting and 1-2 times more daily.    blood-glucose meter kit Use as instructed    cholecalciferol, vitamin D3, (VITAMIN D3) 1,000 unit capsule Take 5,000 Units by mouth once daily.    fish oil-omega-3 fatty acids 300-1,000 mg capsule Take 1 capsule by mouth once daily.     fluticasone propionate (FLONASE) 50 mcg/actuation nasal spray 2 sprays (100 mcg total) by Each Nostril route once daily.    lancets Misc Check blood glucose levels daily in the AM fasting and 1-2 times more daily.    multivitamin capsule Take 1 capsule by mouth once daily.    pantoprazole (PROTONIX) 40 MG tablet Take 1 tablet (40 mg total) by mouth once daily.    acetaminophen (TYLENOL) 500 MG tablet Take 500 mg by mouth every 6 (six) hours as needed for Pain.    clonazePAM (KLONOPIN) 1 MG tablet Take 1 tablet (1 mg total) by mouth every evening. (Patient not taking: Reported on 9/30/2020)    ENTRESTO 24-26 mg per tablet     furosemide (LASIX) 40 MG tablet Take 1 tablet (40 mg total) by mouth once daily. Take extra dose as needed for swelling or weight gain 3 lbs (Patient not taking: Reported on 9/30/2020)    gabapentin (NEURONTIN) 300 MG capsule Take 2 capsules (600 mg total) by mouth every evening. Can take 3 caps qhs or tid if tolerated, may cause drowsiness (Patient not taking: Reported on 9/30/2020)    levocetirizine (XYZAL) 5 MG tablet Take 1 tablet (5 mg total) by mouth every evening. (Patient not taking: Reported on 9/30/2020)    nitroGLYCERIN (NITROSTAT) 0.3 MG SL tablet PLACE 1 TABLET UNDER THE TONGUE EVERY 5 MINUTES AS NEEDED FOR CHEST PAIN AS DIRECTED     No current facility-administered medications for this visit.      Facility-Administered Medications Ordered in Other Visits   Medication    ondansetron injection 4 mg    ondansetron injection 4 mg         REVIEW OF SYSTEMS:    GENERAL:  No weight loss, malaise or fevers.  HEENT:   No recent changes in vision or hearing  NECK:  Negative for lumps, no difficulty with swallowing.  RESPIRATORY:  Negative for cough, wheezing or shortness of breath, patient denies any recent URI.  CARDIOVASCULAR:  Negative for chest pain, leg swelling or palpitations.  GI:  Negative for abdominal discomfort, blood in stools or black stools or change in bowel  "habits.  MUSCULOSKELETAL:  See HPI.  SKIN:  Negative for lesions, rash, and itching.  PSYCH:  No mood disorder or recent psychosocial stressors.  Patients sleep is not disturbed secondary to pain.  HEMATOLOGY/LYMPHOLOGY:  Negative for prolonged bleeding, bruising easily or swollen nodes.  Patient is currently taking anti-coagulants -aspirin and Eliquis  NEURO:   No history of headaches, syncope, paralysis, seizures or tremors.  All other reviewed and negative other than HPI.    OBJECTIVE:    BP (!) 140/71 (BP Location: Left arm, Patient Position: Sitting, BP Method: Medium (Automatic))   Pulse (!) 52   Ht 5' 9" (1.753 m)   Wt 97.7 kg (215 lb 4.5 oz)   BMI 31.79 kg/m²     PHYSICAL EXAMINATION:    GENERAL: Well appearing, in no acute distress, alert and oriented x3.  PSYCH:  Mood and affect appropriate.  SKIN:  Mottling over the lumbar spine from excessive use of heating pad.  There are no other skin color changes, texture, turgor normal, no rashes or lesions.  HEAD/FACE:  Normocephalic, atraumatic. Cranial nerves grossly intact.  CV: RRR with palpation of the radial artery.  PULM: No evidence of respiratory difficulty, symmetric chest rise.  GI:  Soft and non-tender.  BACK:  Surgical scarring over the lumbar spine.  Straight leg raising in the sitting and supine positions is negative to radicular pain.  Mild-to-moderate pain to palpation over the facet joints of the lumbar spine and lumbar paraspinals.  Fair range of motion without pain reproduction.  Axial loading positive bilaterally.  EXTREMITIES: Peripheral joint ROM is full and pain free without obvious instability or laxity in all four extremities. No deformities, edema, or skin discoloration. Good capillary refill.  MUSCULOSKELETAL: Shoulder, hip, and knee provocative maneuvers are negative.  There is no pain with palpation over the sacroiliac joints bilaterally.  FABERs test is negative.  FADIRs test is negative.   Bilateral upper and lower extremity " strength is normal and symmetric.  No atrophy or tone abnormalities are noted.  NEURO: Bilateral upper and lower extremity coordination and muscle stretch reflexes are physiologic and symmetric.  Plantar response are downgoing. No clonus.  No loss of sensation is noted.  GAIT: normal.      LABS:  Lab Results   Component Value Date    WBC 5.36 09/22/2020    HGB 9.0 (L) 09/22/2020    HCT 28.7 (L) 09/22/2020    MCV 92 09/22/2020     09/22/2020       CMP  Sodium   Date Value Ref Range Status   09/22/2020 140 136 - 145 mmol/L Final     Potassium   Date Value Ref Range Status   09/22/2020 4.2 3.5 - 5.1 mmol/L Final     Chloride   Date Value Ref Range Status   09/22/2020 108 95 - 110 mmol/L Final     CO2   Date Value Ref Range Status   09/22/2020 21 (L) 23 - 29 mmol/L Final     Glucose   Date Value Ref Range Status   09/22/2020 104 70 - 110 mg/dL Final     BUN, Bld   Date Value Ref Range Status   09/22/2020 22 8 - 23 mg/dL Final     Creatinine   Date Value Ref Range Status   09/22/2020 1.5 (H) 0.5 - 1.4 mg/dL Final     Calcium   Date Value Ref Range Status   09/22/2020 8.7 8.7 - 10.5 mg/dL Final     Total Protein   Date Value Ref Range Status   09/18/2020 6.7 6.0 - 8.4 g/dL Final     Albumin   Date Value Ref Range Status   09/18/2020 3.7 3.5 - 5.2 g/dL Final     Total Bilirubin   Date Value Ref Range Status   09/18/2020 0.2 0.1 - 1.0 mg/dL Final     Comment:     For infants and newborns, interpretation of results should be based  on gestational age, weight and in agreement with clinical  observations.  Premature Infant recommended reference ranges:  Up to 24 hours.............<8.0 mg/dL  Up to 48 hours............<12.0 mg/dL  3-5 days..................<15.0 mg/dL  6-29 days.................<15.0 mg/dL       Alkaline Phosphatase   Date Value Ref Range Status   09/18/2020 47 (L) 55 - 135 U/L Final     AST   Date Value Ref Range Status   09/18/2020 11 10 - 40 U/L Final     ALT   Date Value Ref Range Status   09/18/2020  7 (L) 10 - 44 U/L Final     Anion Gap   Date Value Ref Range Status   09/22/2020 11 8 - 16 mmol/L Final     eGFR if    Date Value Ref Range Status   09/22/2020 51 (A) >60 mL/min/1.73 m^2 Final     eGFR if non    Date Value Ref Range Status   09/22/2020 44 (A) >60 mL/min/1.73 m^2 Final     Comment:     Calculation used to obtain the estimated glomerular filtration  rate (eGFR) is the CKD-EPI equation.          Lab Results   Component Value Date    HGBA1C 6.1 (H) 09/17/2020             ASSESSMENT: 80 y.o. year old male with  Lower back pain, consistent with     1. Spondylosis without myelopathy or radiculopathy, lumbar region  IR Facet Inj Lumbar 3rd Vert Bi    IR Facet Inj Lumbar 2nd Vert Bi    IR Facet Inj Lumbar 1st Vert Bi    Case Request-RAD/Other Procedure Area: Bilateral L3-5 MBB   2. Lumbar spondylosis     3. DDD (degenerative disc disease), lumbar           PLAN:   - Interventions: Schedule for a diagnostic Medial branch block bilaterally at L3,4, and L5 to help with axial low back pain and progress with a home exercise program..  Explained procedure in detail, risks, benefits, and alternatives with the patient today and he elected to pursue this intervention at this time.  - Anticoagulation: yes, aspirin and Eliquis  - Medications: I have stressed the importance of physical activity and a home exercise plan to help with pain and improve health.  Tramadol was not effective in alleviating his pain.  NSAIDs are contraindicated due to his medical comorbidities. An opioid pain contract was completed on 06/24/2020 after having an extensive conversation about chronic opioid use for pain management.  He is no longer interested in long-term opioid use at this time.  - Therapy:  Advised patient continue with exercises and activities as tolerated.  I expressed that a walking program would be wise to consider.  - Labs:  Reviewed  - Imaging:  Reviewed imaging available  -  Consults/Referrals:  None at this time  - Records:  Reviewed/Obtain old records from outside physicians and imaging  - Follow up visit:  Will call following the diagnostic blocks to determine future plan of care  - Counseled patient regarding the importance of activity modification and physical therapy  - This condition does not require this patient to take time off of work, and the primary goal of our Pain Management services is to improve the patient's functional capacity.  - Patient Questions: Answered all of the patient's questions regarding diagnosis, therapy, and treatment    The above plan and management options were discussed at length with patient. Patient is in agreement with the above and verbalized understanding.      Tacho Trivedi MD  Interventional Pain Management  [facility]    Disclaimer:  This note was prepared using voice recognition system and is likely to have sound alike errors that may have been overlooked even after proof reading.  Please call me with any questions

## 2020-09-30 NOTE — PROGRESS NOTES
Transitional Care Note  Subjective:       Patient ID: Jake Ortiz is a 80 y.o. male.  Chief Complaint: Hospital Follow Up    Family and/or Caretaker present at visit?  No.  Diagnostic tests reviewed/disposition: I have reviewed all completed as well as pending diagnostic tests at the time of discharge.  Disease/illness education: None  Home health/community services discussion/referrals: Patient does not have home health established from hospital visit.  They do not need home health.  If needed, we will set up home health for the patient.   Establishment or re-establishment of referral orders for community resources: No other necessary community resources.   Discussion with other health care providers: No discussion with other health care providers necessary.   [facility]   Hospital Follow-up  81yo male presents today for hospital follow-up. He was admitted to [facility] on 9/18/2020 secondary to rectal bleeding. H&H on admission was measured at 5/1/16.9. He was provided with transfusion and anticoagulants were held. He underwent EGD and C scope during admission. Findings on EGD were consistent with gastritis and PPI was initiated. C scope demonstrated polyps- pathology is pending. Outpatient follow-up with GI for video capsule endoscopy was recommended- arrangements have not yet been made. Patient's discharge H&H was 9/0/28.0. He reports feeling significantly better today. There is no report of any rectal bleeding or melena and he denies any abdominal pain. He has resumed anticoagulant use and maintains Protonix and Klonopin routinely but has otherwise discontinued all his medications. Notes having a visit with Cardiology at which time these changes were discussed. He is scheduled for bilateral L3-5 MBB next week per IPM.     Review of Systems   Constitutional: Positive for fatigue. Negative for activity change, appetite change and unexpected weight change.   HENT: Negative for congestion, ear pain, postnasal drip, sinus  "pressure and sore throat.    Eyes: Negative for visual disturbance.   Respiratory: Negative for cough and shortness of breath.    Cardiovascular: Negative for chest pain and palpitations.   Gastrointestinal: Negative for abdominal pain, blood in stool, constipation, diarrhea and nausea.   Genitourinary: Negative for difficulty urinating and hematuria.   Musculoskeletal: Positive for arthralgias and back pain. Negative for myalgias.   Skin: Negative for rash.   Neurological: Negative for dizziness, weakness and headaches.   Psychiatric/Behavioral: Negative for dysphoric mood and sleep disturbance. The patient is not nervous/anxious.        Objective:     /62   Pulse 85   Temp 97.3 °F (36.3 °C) (Temporal)   Resp 16   Ht 5' 9" (1.753 m)   Wt 97.7 kg (215 lb 8 oz)   SpO2 97%   BMI 31.82 kg/m²     Physical Exam  Constitutional:       General: He is not in acute distress.     Appearance: He is well-developed. He is obese.   HENT:      Head: Normocephalic and atraumatic.      Right Ear: Tympanic membrane, ear canal and external ear normal.      Left Ear: Tympanic membrane, ear canal and external ear normal.      Nose: Nose normal.      Mouth/Throat:      Mouth: Mucous membranes are moist.   Eyes:      Extraocular Movements: Extraocular movements intact.      Conjunctiva/sclera: Conjunctivae normal.      Pupils: Pupils are equal, round, and reactive to light.   Neck:      Musculoskeletal: Normal range of motion and neck supple.   Cardiovascular:      Rate and Rhythm: Normal rate and regular rhythm.      Heart sounds: Murmur present.   Pulmonary:      Effort: Pulmonary effort is normal.      Breath sounds: Normal breath sounds.   Abdominal:      General: Bowel sounds are normal.      Palpations: Abdomen is soft.   Musculoskeletal:         General: No swelling.   Skin:     General: Skin is warm and dry.      Coloration: Skin is pale.   Neurological:      Mental Status: He is alert and oriented to person, place, " and time.   Psychiatric:         Mood and Affect: Mood normal.         Behavior: Behavior normal.         Assessment:       1. Lower GI bleed    2. Acute blood loss anemia    3. VAIBHAV (acute kidney injury)    4. Congestive heart failure, unspecified HF chronicity, unspecified heart failure type    5. Paroxysmal atrial fibrillation    6. Mixed restrictive and obstructive lung disease    7. Coronary artery disease involving native coronary artery of native heart without angina pectoris    8. Essential hypertension    9. Mixed hyperlipidemia    10. Obesity (BMI 30-39.9)    11. S/P CABG x 3        Plan:       Lower GI bleed  -     CBC auto differential; Future; Expected date: 09/30/2020  -     Basic metabolic panel; Future; Expected date: 09/30/2020  -     Ambulatory referral/consult to Gastroenterology; Future; Expected date: 10/07/2020    Acute blood loss anemia  -     CBC auto differential; Future; Expected date: 09/30/2020    VAIBHAV (acute kidney injury)  -     Basic metabolic panel; Future; Expected date: 09/30/2020    Congestive heart failure, unspecified HF chronicity, unspecified heart failure type  As per Cardiology.    Paroxysmal atrial fibrillation  As above.    Mixed restrictive and obstructive lung disease  As per Pulmonology.    Coronary artery disease involving native coronary artery of native heart without angina pectoris  As per Cardiology.    Essential hypertension  Stable. As per Cardiology.    Mixed hyperlipidemia  Compliance with statin use in combination with heart healthy diet is advised.    Obesity (BMI 30-39.9)  Weigh loss efforts are encouraged through diet and lifestyle measures.    S/P CABG x 3    Other orders  -     Influenza - Quadrivalent (Adjuvanted)      Will reach out to Cardiology- patient with some questions about his current medication regimen.  Arrangements for GI eval made.  Reviewed w/s and reasons to seek reassessment in the ER should symptoms evolve. Awaiting lab update for  additional recommendations.

## 2020-09-30 NOTE — PATIENT INSTRUCTIONS
Pain Management Pre-Procedure Instructions  (also available in your [employer] account)    Patient Name:___Jake Ramoso____MRN: [id number] you are scheduled to have the following procedure:__ Lumbar Medial Branch Block  _with______Tacho Trivedi MD on: _10/12/2020______ at: [facility]    You will be contacted the day before your procedure to be given an arrival and procedure time                                                                                                            Day of Procedure   Ensure you have obtained arrival time from the Pain Management department  o We will call 48 hours in advance with your arrival time. Please check any voicemails you may have  o If you arrive past your scheduled procedure time, you may be asked to reschedule your procedure.   For your safety, ensure you have a  with you to remain present throughout your procedure   o If you arrive without a responsible adult to stay with you and drive you home, you may be asked to reschedule your procedure   Take all of your prescribed medications (exceptions noted below) with a small amount of water  o Stop all insulin and blood sugar medication night before and day of , take other medications as prescribed   o [x] Nothing by mouth after midnight the night before your procedure.  It is ok to take your regular medications with a small sip of water.     Wear loose, comfortable clothing    You may wear glasses, dentures, contact lenses and/or hearing aids. Please leave all valuable items at home.   Contact the Pain Management department at [phone] or via [employer] if you are:  o Running a fever above 100 degrees  o Feel ill, have any type of infection, or are taking antibiotics now or have in the past 2 weeks  o Have had any outpatient procedures in the past 2 weeks (colonoscopy, major dental work, etc.)  o If you are allergic to iodine, IVP dye or shellfish.      Contact Information: [phone], ask  to speak to the pain management department with any questions or concerns or send a message via [employer]

## 2020-09-30 NOTE — H&P (VIEW-ONLY)
Established Patient Chronic Pain Note (Follow up visit)    Chief Complaint:   Chief Complaint   Patient presents with    Back Pain     Lower back pain        SUBJECTIVE:  Jake Ortiz is a 80 y.o. male who presents to the clinic for a follow-up appointment for Lower back pain.  At the last visit, Tramadol discontinue due to his ineffectiveness.  He also reports that he was not interested in long-term medication management.  A physical therapy prescription was ordered for further evaluation and treatment of his lower back pain..  He has attended a total of 8 sessions with physical therapy and reports minimal benefit..  He is interested in finding an acupuncturist as this has worked well in the past, but has not had much luck and finding a quality acupuncturist in the area.  Since the last visit, Jake Ortiz states the pain has been persistent.  Current pain intensity is 0/10 while seated and resting, but pain can increase to 10/10 with increased levels of activity.      Patient denies night fever/night sweats, urinary incontinence, bowel incontinence, significant weight loss, significant motor weakness and loss of sensations.      Pain Disability Index Review:  Last 3 PDI Scores 3/10/2020 1/31/2020   Pain Disability Index (PDI) 33 27     Interval history (07/22/2020):  Jake Ortiz is a 80 y.o. male who presents to the clinic for a follow-up appointment for Lower back pain.  At the last visit, and opioid pain contract was completed in started him on tramadol 50 mg Q 12 p.r.n. pain.  Since the last visit, Jake Ortiz states the pain has been persistent.  He reports that the tramadol did not provide significant pain relief and is not interested in medication management at this time.  Current pain intensity is 0/10.  He reports that his pain gets up to about a 5/10 with increased movement and localizes pain to the lower lumbosacral area.  He denies any radicular pain down the lower extremities.    Interval  history (06/24/2020): Jake Ortiz is a 80 y.o. male who presents to the clinic for a follow-up appointment for Lower back pain. Since the last visit, Jake Ortiz states the pain has been persistant. Current pain intensity is 0/10.  He reports that his pain gets up to about a 5/10 with increased movement and localizes pain to the lower lumbosacral area.  He denies any radicular pain down the lower extremities.    Interval History (3/10/2020): Patient was seen on 2/10/20. At that time he underwent caudal XUAN.  The patient reports that he is/was unchanged following the procedure.  he reports 15% pain relief.  The changes lasted 2 days.       Interval History (1/31/2020): S/p L5/S1 IL XUAN on 1/6/2020 with limited pain relief for a few days.      Interval History (12/17/19): Patient was seen on 11/22/19. At that time he underwent L5/S1 IL XUAN.  The patient reports that he is/was unchanged following the procedure.  he reports some pain relief for a few days.  He is very active normally and would like to start dancing again.      Initial History of Present Illness:   This patient is a 79 y.o. male who presents today complaining of the above noted pain/s. The patient describes the pain as follows.  Mr. Ortiz is a new patient to clinic with complaints of back pain. Currently rates his pain 0/10 describes was seen aching sensation which is worse with bending and standing and has been somewhat improved with acupuncture.  His symptoms started many years ago he has tried several different types of therapy including physical therapy, chiropractor, acupuncture, heating pad and ice packs with varying degrees benefit.  He has undergone lumbar surgery in the past however is unsure 1 exact was perform the surgery.  There is no evidence of fusion on x-ray in all disc appeared to be intact. He has undergone medial branch blocks and radiofrequency ablation at an outside pain clinic in April 2019 however he reports that he has  received 0 benefit from these procedures.  He has been using [employer] topical cream on lumbar spine which does provide some benefit.  He finds his symptoms are worse with activity such as standing for long periods to wash dishes and cleaning house while his symptoms are completely resolved with sitting and rest.  He denies having any rib radiating symptoms and denies having bowel bladder difficulty. He has used extra-strength Tylenol in the past with no benefit.  He has completed physical therapy in the past and in performs a pretty in depth exercise routine on a daily basis.     Previous Therapy:  Medications:  Extra-strength Tylenol, Klonopin  Injections:  - series of 3 injections (what sounds like ESIs) about 30 years ago with relief  - Lumbar Medial Branch Blocks and Lumbar Radiofrequency Ablation with limited relief  - L5/S1 IL XUAN on 11/22/19 with some pain relief for a few days  - L5/S1 IL XUAN on 1/6/2020 with limited pain relief for a few days (although noticed medication didn't spread well after looking through images with Dr. Trivedi)  - caudal XUAN on 2/10/20 with 15% pain relief x 2 days  Surgeries:  Lumbar surgery with Dr. Powers with complications with staph infection causing 2 subsequent surgeries       Physical Therapy:  Yes      :       Imaging:   CT Lumbar Spine Without Contrast     Narrative COMPARISON:  Lumbar spine MRI performed on 06/08/2011 and lumbar spine radiographs on 08/08/2011  FINDINGS:  Since the prior MRI examination, development complete osseous fusion the L2 and L3 vertebral bodies.  Laminectomy/laminotomy deformities from L2 through L5.Minimal retrolisthesis at L3 with respect to L4 measuring approximately 4 mm.  At T12-L1 there is moderate degenerative disc disease with vacuum disc phenomenon.  Circumferential disc bulge with mild bilateral facet DJD that result in mild bilateral neural foraminal narrowing and mild spinal canal stenosis.  At L1-L2, there is no significant  neural foraminal narrowing or spinal canal stenosis.  At L2-L3, no significant neural foraminal narrowing or spinal canal stenosis.  At L3-L4, there is moderate degenerative disc disease with vacuum disc phenomenon and central disc osteophyte complex.  Indents facet DJD contributes to moderate bilateral neural foraminal narrowing.  At L4-5, there is severe degenerative disc disease with vacuum disc phenomenon.  Advanced facet DJD contributes to moderate left and severe right neural foraminal narrowing is.  At L5-S1, there is mild bilateral neural foraminal narrowing secondary to facet DJD.  Extensive postoperative stranding throughout the posterior.  Spinal tissues.  Ascites is demonstrated in the abdomen.     Impression Multilevel postoperative and degenerative changes as discussed in detail above.  All CT scans at this facility are performed  using dose modulation techniques as appropriate to performed exam including the following:  automated exposure control; adjustment of mA and/or kV according to the patients size (this includes techniques or standardized protocols for targeted exams where dose is matched to indication/reason for exam: i.e. extremities or head);  iterative reconstruction technique.           Results for orders placed in visit on 03/03/11   MRI Lumbar Spine Without Contrast     Narrative RESULTS: THIS STUDY DEMONSTRATES SEVERE DEGENERATIVE END PLATE CHANGES AT L4-5 AND L2-3.  THERE IS A LARGE ANTERIOR OSTEOPHYTE PROJECTING OFF THE END PLATES OF L3, L2, AND L4.  THERE ARE ALSO PROMINENT DISC BULGES AT L4-5, L3-4, AND L2-3.  THIS STUDY DEMONSTRATES MULTILEVEL FACET ARTHROPATHY.    IMPRESSION: PROMINENT DEGENERATIVE DISC DISEASE AS DESCRIBED IN THE ABOVE PARAGRAPH. THIS STUDY ALSO DEMONSTRATES PROMINENT RIGHT NEURAL FORAMINAL NARROWING AT L4-5 AND LEFT NEURAL FORAMINAL NARROWING AT L4-5.            Results for orders placed during the hospital encounter of 06/08/11   MRI Lumbar Spine W WO Cont      Narrative RESULTS:  Indication:     Back pain patient's had extensive operative intervention   with laminectomy starting at L3 and extending to mid L4. There also   appears to be a right laminectomy at L4-L5. Multiple pedicle screws   apparently have been removed.  L1-L2 level unremarkable.  L2-L3 disc is narrowed there is mild spondylosis with minor facet   arthropathy present. There is some posterior paravertebral edema present   right greater than left extending from right facet lesion of L2-L3. There   is mild central stenosis present but the L2 nerve roots exit without   contact or distortion. There screw artifacts present in the pedicles at   L2 and L3-L4 and L5.  L3-L4 disc is narrowed there is mild spondylosis patient's had   laminectomy there is extensive para spinous muscular edema with fluid   along the left lamina and paraspinous region is also considerable loss of   fat plane within the paravertebral musculature of the spine from L1-L2   down to sacral region.  L4-L5 level is obliterated right laminectomy is present is extensive   edema and loss of fat planes within the paravertebral musculature and   around the posterior and lateral thecal sac.  The L5-S1 disc is intact there is facet arthropathy present.  Postcontrast study demonstrates diffuse enhancement of the  paravertebral   soft tissues starting at approximately the T12 and extending to the   sacral level.  There is diffuse enhancement of the L2-L3 disc with enhancing tissues of   the posterior paraspinous region and minor facet joint enhancement. There   is also enhancement of the posterior epidural region resulting in some   distortion of the dorsal lateral thecal sac, left greater than right.  The L3-L4 level demonstrates minimal central and left-sided enhancement   of the disc with extensive  enhancement of the paraspinous elements with   enhancement of the posterior and lateral epidural canal. No intradural   enhancement is noted. There is  some enhancement around the right residual   facet . A portion of the left facet complex is absent..  The L4-L5 level again demonstrates comparable diffuse paraspinous   enhancement and enhancement surrounding the thecal sac within the central   canal. There is enhancement of the bony elements including the pedicles   and facet complex.  L5-S1 demonstrate some  facet enhancement and posterior perithecal   enhancement  IMPRESSION:       Patient's had extensive operative intervention with removal of   pedicle screws at L2-L3 L4 and L5. There is extensive enhancement of the   operative bed in the posterior epidural region as well is some   enhancement of the scar tissue around the thecal sac. There Is no   intradural or intramedullary enhancement although there is clumping of   the cauda and enhancement of the L2-L3 and L4-L5 disc. There is also   small amount enhancement of the posterior aspect of the L3-L4 disc.   enhancement consistent with gliosis. The various levels of bone   enhancement including the facet complex at L3 and L4 as well as portions   of the pedicles involving L5 involving and adjacent to the screw tracks..           Results for orders placed during the hospital encounter of 07/11/18   CT Lumbar Spine Without Contrast     Narrative COMPARISON:  Lumbar spine MRI performed on 06/08/2011 and lumbar spine radiographs on 08/08/2011  FINDINGS:  Since the prior MRI examination, development complete osseous fusion the L2 and L3 vertebral bodies.  Laminectomy/laminotomy deformities from L2 through L5.Minimal retrolisthesis at L3 with respect to L4 measuring approximately 4 mm.  At T12-L1 there is moderate degenerative disc disease with vacuum disc phenomenon.  Circumferential disc bulge with mild bilateral facet DJD that result in mild bilateral neural foraminal narrowing and mild spinal canal stenosis.  At L1-L2, there is no significant neural foraminal narrowing or spinal canal stenosis.  At L2-L3, no  significant neural foraminal narrowing or spinal canal stenosis.  At L3-L4, there is moderate degenerative disc disease with vacuum disc phenomenon and central disc osteophyte complex.  Indents facet DJD contributes to moderate bilateral neural foraminal narrowing.  At L4-5, there is severe degenerative disc disease with vacuum disc phenomenon.  Advanced facet DJD contributes to moderate left and severe right neural foraminal narrowing is.  At L5-S1, there is mild bilateral neural foraminal narrowing secondary to facet DJD.  Extensive postoperative stranding throughout the posterior.  Spinal tissues.  Ascites is demonstrated in the abdomen.     Impression Multilevel postoperative and degenerative changes as discussed in detail above.  All CT scans at this facility are performed  using dose modulation techniques as appropriate to performed exam including the following:  automated exposure control; adjustment of mA and/or kV according to the patients size (this includes techniques or standardized protocols for targeted exams where dose is matched to indication/reason for exam: i.e. extremities or head);  iterative reconstruction technique.           Results for orders placed during the hospital encounter of 09/25/19   X-Ray Lumbar Spine Complete 5 View     Narrative COMPARISON:  05/30/2018  FINDINGS:  Scoliosis remains.  Vertebral body heights and alignment are stable.  Multilevel advanced degenerative disc height loss and osteophyte formation noted.  Multilevel facet arthropathy present more prevalent at the lower lumbar spine.  No acute osseous abnormality appreciated.  Aorta iliac atherosclerotic vascular calcifications noted.     Impression Similar appearance of the spine.  Correlate with MRI exam as clinically indicated.           Results for orders placed during the hospital encounter of 05/30/18   X-Ray Lumbar Spine AP And Lateral     Narrative COMPARISON:  08/08/2011  FINDINGS:  Mild-to-moderate  dextroscoliosis of the lumbar spine noted.  There is suggestion of possible mild loss of vertebral body height at L5 which may potentially be projectional in nature and was not definitely seen on prior.  Age-indeterminate compression fracture not excluded.  Further characterization could be obtained with MRI as clinically warranted.  Moderate disc height loss from L2-3 through L3-4 appears unchanged.  Multilevel facet arthropathy suspected throughout the lumbar spine.  Posterior elements appear grossly intact. No acute fractures or subluxations are demonstrated.  Visualized osseous structures appear diffusely osteopenic.     Impression As above.           Results for orders placed during the hospital encounter of 08/26/14   X-Ray Cervical Spine AP And Lateral     Narrative Findings: Vertebral alignment is normal.  There is narrowing of the disk spaces and  anterior osteophyte formation C 3-7.  There are no compression fractures or acute abnormalities are seen.  Carotid calcifications are noted bilaterally.     Impression  Advanced degenerative change in the cervical spine.           PMHx,PSHx, Social history, and Family history:  I have reviewed the patient's medical, surgical, social, and family history in detail and updated the computerized patient record.      Review of patient's allergies indicates:  No Known Allergies    Current Outpatient Medications   Medication Sig    apixaban (ELIQUIS) 5 mg Tab Take 1 tablet (5 mg total) by mouth 2 (two) times daily.    aspirin (ECOTRIN) 81 MG EC tablet Take 81 mg by mouth.    atorvastatin (LIPITOR) 80 MG tablet TK 1 T PO D    blood sugar diagnostic Strp Check blood glucose levels daily in the AM fasting and 1-2 times more daily.    blood-glucose meter kit Use as instructed    cholecalciferol, vitamin D3, (VITAMIN D3) 1,000 unit capsule Take 5,000 Units by mouth once daily.    fish oil-omega-3 fatty acids 300-1,000 mg capsule Take 1 capsule by mouth once daily.     fluticasone propionate (FLONASE) 50 mcg/actuation nasal spray 2 sprays (100 mcg total) by Each Nostril route once daily.    lancets Misc Check blood glucose levels daily in the AM fasting and 1-2 times more daily.    multivitamin capsule Take 1 capsule by mouth once daily.    pantoprazole (PROTONIX) 40 MG tablet Take 1 tablet (40 mg total) by mouth once daily.    acetaminophen (TYLENOL) 500 MG tablet Take 500 mg by mouth every 6 (six) hours as needed for Pain.    clonazePAM (KLONOPIN) 1 MG tablet Take 1 tablet (1 mg total) by mouth every evening. (Patient not taking: Reported on 9/30/2020)    ENTRESTO 24-26 mg per tablet     furosemide (LASIX) 40 MG tablet Take 1 tablet (40 mg total) by mouth once daily. Take extra dose as needed for swelling or weight gain 3 lbs (Patient not taking: Reported on 9/30/2020)    gabapentin (NEURONTIN) 300 MG capsule Take 2 capsules (600 mg total) by mouth every evening. Can take 3 caps qhs or tid if tolerated, may cause drowsiness (Patient not taking: Reported on 9/30/2020)    levocetirizine (XYZAL) 5 MG tablet Take 1 tablet (5 mg total) by mouth every evening. (Patient not taking: Reported on 9/30/2020)    nitroGLYCERIN (NITROSTAT) 0.3 MG SL tablet PLACE 1 TABLET UNDER THE TONGUE EVERY 5 MINUTES AS NEEDED FOR CHEST PAIN AS DIRECTED     No current facility-administered medications for this visit.      Facility-Administered Medications Ordered in Other Visits   Medication    ondansetron injection 4 mg    ondansetron injection 4 mg         REVIEW OF SYSTEMS:    GENERAL:  No weight loss, malaise or fevers.  HEENT:   No recent changes in vision or hearing  NECK:  Negative for lumps, no difficulty with swallowing.  RESPIRATORY:  Negative for cough, wheezing or shortness of breath, patient denies any recent URI.  CARDIOVASCULAR:  Negative for chest pain, leg swelling or palpitations.  GI:  Negative for abdominal discomfort, blood in stools or black stools or change in bowel  "habits.  MUSCULOSKELETAL:  See HPI.  SKIN:  Negative for lesions, rash, and itching.  PSYCH:  No mood disorder or recent psychosocial stressors.  Patients sleep is not disturbed secondary to pain.  HEMATOLOGY/LYMPHOLOGY:  Negative for prolonged bleeding, bruising easily or swollen nodes.  Patient is currently taking anti-coagulants -aspirin and Eliquis  NEURO:   No history of headaches, syncope, paralysis, seizures or tremors.  All other reviewed and negative other than HPI.    OBJECTIVE:    BP (!) 140/71 (BP Location: Left arm, Patient Position: Sitting, BP Method: Medium (Automatic))   Pulse (!) 52   Ht 5' 9" (1.753 m)   Wt 97.7 kg (215 lb 4.5 oz)   BMI 31.79 kg/m²     PHYSICAL EXAMINATION:    GENERAL: Well appearing, in no acute distress, alert and oriented x3.  PSYCH:  Mood and affect appropriate.  SKIN:  Mottling over the lumbar spine from excessive use of heating pad.  There are no other skin color changes, texture, turgor normal, no rashes or lesions.  HEAD/FACE:  Normocephalic, atraumatic. Cranial nerves grossly intact.  CV: RRR with palpation of the radial artery.  PULM: No evidence of respiratory difficulty, symmetric chest rise.  GI:  Soft and non-tender.  BACK:  Surgical scarring over the lumbar spine.  Straight leg raising in the sitting and supine positions is negative to radicular pain.  Mild-to-moderate pain to palpation over the facet joints of the lumbar spine and lumbar paraspinals.  Fair range of motion without pain reproduction.  Axial loading positive bilaterally.  EXTREMITIES: Peripheral joint ROM is full and pain free without obvious instability or laxity in all four extremities. No deformities, edema, or skin discoloration. Good capillary refill.  MUSCULOSKELETAL: Shoulder, hip, and knee provocative maneuvers are negative.  There is no pain with palpation over the sacroiliac joints bilaterally.  FABERs test is negative.  FADIRs test is negative.   Bilateral upper and lower extremity " strength is normal and symmetric.  No atrophy or tone abnormalities are noted.  NEURO: Bilateral upper and lower extremity coordination and muscle stretch reflexes are physiologic and symmetric.  Plantar response are downgoing. No clonus.  No loss of sensation is noted.  GAIT: normal.      LABS:  Lab Results   Component Value Date    WBC 5.36 09/22/2020    HGB 9.0 (L) 09/22/2020    HCT 28.7 (L) 09/22/2020    MCV 92 09/22/2020     09/22/2020       CMP  Sodium   Date Value Ref Range Status   09/22/2020 140 136 - 145 mmol/L Final     Potassium   Date Value Ref Range Status   09/22/2020 4.2 3.5 - 5.1 mmol/L Final     Chloride   Date Value Ref Range Status   09/22/2020 108 95 - 110 mmol/L Final     CO2   Date Value Ref Range Status   09/22/2020 21 (L) 23 - 29 mmol/L Final     Glucose   Date Value Ref Range Status   09/22/2020 104 70 - 110 mg/dL Final     BUN, Bld   Date Value Ref Range Status   09/22/2020 22 8 - 23 mg/dL Final     Creatinine   Date Value Ref Range Status   09/22/2020 1.5 (H) 0.5 - 1.4 mg/dL Final     Calcium   Date Value Ref Range Status   09/22/2020 8.7 8.7 - 10.5 mg/dL Final     Total Protein   Date Value Ref Range Status   09/18/2020 6.7 6.0 - 8.4 g/dL Final     Albumin   Date Value Ref Range Status   09/18/2020 3.7 3.5 - 5.2 g/dL Final     Total Bilirubin   Date Value Ref Range Status   09/18/2020 0.2 0.1 - 1.0 mg/dL Final     Comment:     For infants and newborns, interpretation of results should be based  on gestational age, weight and in agreement with clinical  observations.  Premature Infant recommended reference ranges:  Up to 24 hours.............<8.0 mg/dL  Up to 48 hours............<12.0 mg/dL  3-5 days..................<15.0 mg/dL  6-29 days.................<15.0 mg/dL       Alkaline Phosphatase   Date Value Ref Range Status   09/18/2020 47 (L) 55 - 135 U/L Final     AST   Date Value Ref Range Status   09/18/2020 11 10 - 40 U/L Final     ALT   Date Value Ref Range Status   09/18/2020  7 (L) 10 - 44 U/L Final     Anion Gap   Date Value Ref Range Status   09/22/2020 11 8 - 16 mmol/L Final     eGFR if    Date Value Ref Range Status   09/22/2020 51 (A) >60 mL/min/1.73 m^2 Final     eGFR if non    Date Value Ref Range Status   09/22/2020 44 (A) >60 mL/min/1.73 m^2 Final     Comment:     Calculation used to obtain the estimated glomerular filtration  rate (eGFR) is the CKD-EPI equation.          Lab Results   Component Value Date    HGBA1C 6.1 (H) 09/17/2020             ASSESSMENT: 80 y.o. year old male with  Lower back pain, consistent with     1. Spondylosis without myelopathy or radiculopathy, lumbar region  IR Facet Inj Lumbar 3rd Vert Bi    IR Facet Inj Lumbar 2nd Vert Bi    IR Facet Inj Lumbar 1st Vert Bi    Case Request-RAD/Other Procedure Area: Bilateral L3-5 MBB   2. Lumbar spondylosis     3. DDD (degenerative disc disease), lumbar           PLAN:   - Interventions: Schedule for a diagnostic Medial branch block bilaterally at L3,4, and L5 to help with axial low back pain and progress with a home exercise program..  Explained procedure in detail, risks, benefits, and alternatives with the patient today and he elected to pursue this intervention at this time.  - Anticoagulation: yes, aspirin and Eliquis  - Medications: I have stressed the importance of physical activity and a home exercise plan to help with pain and improve health.  Tramadol was not effective in alleviating his pain.  NSAIDs are contraindicated due to his medical comorbidities. An opioid pain contract was completed on 06/24/2020 after having an extensive conversation about chronic opioid use for pain management.  He is no longer interested in long-term opioid use at this time.  - Therapy:  Advised patient continue with exercises and activities as tolerated.  I expressed that a walking program would be wise to consider.  - Labs:  Reviewed  - Imaging:  Reviewed imaging available  -  Consults/Referrals:  None at this time  - Records:  Reviewed/Obtain old records from outside physicians and imaging  - Follow up visit:  Will call following the diagnostic blocks to determine future plan of care  - Counseled patient regarding the importance of activity modification and physical therapy  - This condition does not require this patient to take time off of work, and the primary goal of our Pain Management services is to improve the patient's functional capacity.  - Patient Questions: Answered all of the patient's questions regarding diagnosis, therapy, and treatment    The above plan and management options were discussed at length with patient. Patient is in agreement with the above and verbalized understanding.      Tacho Trivedi MD  Interventional Pain Management  [facility]    Disclaimer:  This note was prepared using voice recognition system and is likely to have sound alike errors that may have been overlooked even after proof reading.  Please call me with any questions

## 2020-10-01 ENCOUNTER — PATIENT MESSAGE (OUTPATIENT)
Dept: FAMILY MEDICINE | Facility: CLINIC | Age: 80
End: 2020-10-01

## 2020-10-01 ENCOUNTER — TELEPHONE (OUTPATIENT)
Dept: FAMILY MEDICINE | Facility: CLINIC | Age: 80
End: 2020-10-01

## 2020-10-01 NOTE — TELEPHONE ENCOUNTER
----- Message from Chanda Brush MD sent at 10/1/2020  9:28 AM CDT -----  Kidney function has improved but hemoglobin has decreased since discharge- is he able to see GI sooner than scheduled?

## 2020-10-02 ENCOUNTER — PATIENT MESSAGE (OUTPATIENT)
Dept: GASTROENTEROLOGY | Facility: CLINIC | Age: 80
End: 2020-10-02

## 2020-10-02 ENCOUNTER — OFFICE VISIT (OUTPATIENT)
Dept: GASTROENTEROLOGY | Facility: CLINIC | Age: 80
End: 2020-10-02
Payer: MEDICARE

## 2020-10-02 VITALS
BODY MASS INDEX: 32.56 KG/M2 | WEIGHT: 219.81 LBS | SYSTOLIC BLOOD PRESSURE: 134 MMHG | DIASTOLIC BLOOD PRESSURE: 62 MMHG | HEIGHT: 69 IN | HEART RATE: 85 BPM | OXYGEN SATURATION: 98 %

## 2020-10-02 DIAGNOSIS — D64.9 ANEMIA, UNSPECIFIED TYPE: Primary | ICD-10-CM

## 2020-10-02 DIAGNOSIS — K92.2 LOWER GI BLEED: ICD-10-CM

## 2020-10-02 PROCEDURE — 99214 OFFICE O/P EST MOD 30 MIN: CPT | Mod: HCNC,S$GLB,, | Performed by: PHYSICIAN ASSISTANT

## 2020-10-02 PROCEDURE — 3075F SYST BP GE 130 - 139MM HG: CPT | Mod: HCNC,CPTII,S$GLB, | Performed by: PHYSICIAN ASSISTANT

## 2020-10-02 PROCEDURE — 1126F PR PAIN SEVERITY QUANTIFIED, NO PAIN PRESENT: ICD-10-PCS | Mod: HCNC,S$GLB,, | Performed by: PHYSICIAN ASSISTANT

## 2020-10-02 PROCEDURE — 99999 PR PBB SHADOW E&M-EST. PATIENT-LVL V: ICD-10-PCS | Mod: PBBFAC,HCNC,, | Performed by: PHYSICIAN ASSISTANT

## 2020-10-02 PROCEDURE — 1101F PT FALLS ASSESS-DOCD LE1/YR: CPT | Mod: HCNC,CPTII,S$GLB, | Performed by: PHYSICIAN ASSISTANT

## 2020-10-02 PROCEDURE — 1159F PR MEDICATION LIST DOCUMENTED IN MEDICAL RECORD: ICD-10-PCS | Mod: HCNC,S$GLB,, | Performed by: PHYSICIAN ASSISTANT

## 2020-10-02 PROCEDURE — 3075F PR MOST RECENT SYSTOLIC BLOOD PRESS GE 130-139MM HG: ICD-10-PCS | Mod: HCNC,CPTII,S$GLB, | Performed by: PHYSICIAN ASSISTANT

## 2020-10-02 PROCEDURE — 99214 PR OFFICE/OUTPT VISIT, EST, LEVL IV, 30-39 MIN: ICD-10-PCS | Mod: HCNC,S$GLB,, | Performed by: PHYSICIAN ASSISTANT

## 2020-10-02 PROCEDURE — 3078F DIAST BP <80 MM HG: CPT | Mod: HCNC,CPTII,S$GLB, | Performed by: PHYSICIAN ASSISTANT

## 2020-10-02 PROCEDURE — 1101F PR PT FALLS ASSESS DOC 0-1 FALLS W/OUT INJ PAST YR: ICD-10-PCS | Mod: HCNC,CPTII,S$GLB, | Performed by: PHYSICIAN ASSISTANT

## 2020-10-02 PROCEDURE — 99999 PR PBB SHADOW E&M-EST. PATIENT-LVL V: CPT | Mod: PBBFAC,HCNC,, | Performed by: PHYSICIAN ASSISTANT

## 2020-10-02 PROCEDURE — 1159F MED LIST DOCD IN RCRD: CPT | Mod: HCNC,S$GLB,, | Performed by: PHYSICIAN ASSISTANT

## 2020-10-02 PROCEDURE — 1126F AMNT PAIN NOTED NONE PRSNT: CPT | Mod: HCNC,S$GLB,, | Performed by: PHYSICIAN ASSISTANT

## 2020-10-02 PROCEDURE — 3078F PR MOST RECENT DIASTOLIC BLOOD PRESSURE < 80 MM HG: ICD-10-PCS | Mod: HCNC,CPTII,S$GLB, | Performed by: PHYSICIAN ASSISTANT

## 2020-10-02 NOTE — PROGRESS NOTES
Clinic Consult:  [facility] Consultation Note    Reason for Consult:  The primary encounter diagnosis was Anemia, unspecified type. A diagnosis of Lower GI bleed was also pertinent to this visit.    PCP: Chanda Brush   [address] [facility] [address]    CC: Anemia      HPI:  This is a 80 y.o. male here for hospital follow up of GI bleed. Patient began with maroon colored stools last week. Symptoms progressed and patient became weak, fatigued and short of breath. Called ambulance and was brought to the ER where his hemoglobin was found to be 5.1. He was transfused 2 units. He is on blood thinners which were held at that time. Continued with another bloody bowel movement a couple of days later while waiting for scopes, as he had to be off of his anticoagulation for 5 days prior to EGD/colonoscopy. Again, he was transfused. EGD and colonoscopy showed no sign of active bleed. Was instructed to follow up for possible VCE. Hemoglobin was up to 9.0 on discharge. Repeat labs on 9/30 show a decrease to 8.4. He denies any sign of bloody stools since discharge. Only complaint is that he notices he can become winded more easily than before. Feels well overall.    *Patient states this occurred approximately 2 years ago and had full work up with no findings. JENNIFER unable to complete video capsule due to pacemaker.    Consulta CRT Pacemaker    Review of Systems   Constitutional: Negative for activity change, appetite change, chills, diaphoresis, fatigue, fever and unexpected weight change.   HENT: Negative for sore throat, trouble swallowing and voice change.    Respiratory: Positive for shortness of breath (with exertion). Negative for cough.    Cardiovascular: Negative for chest pain.   Gastrointestinal: Negative for abdominal distention, abdominal pain, anal bleeding, blood in stool, constipation, diarrhea, nausea, rectal pain and vomiting.   Musculoskeletal: Positive for back pain (chronic).    Neurological: Negative for dizziness, weakness and light-headedness.   Psychiatric/Behavioral: Negative for dysphoric mood. The patient is not nervous/anxious.         Medical History:   Past Medical History:   Diagnosis Date    Abnormal PFT     Anemia     Arthritis     Atrial fibrillation 10/19/2017    Back pain     Congestive heart failure 3/5/2018    Coronary artery disease     Diabetes mellitus 01/2018     am 02/27/2018    DM (diabetes mellitus) 2018     am 06/23/2020    Hyperlipemia     Hypertension     Myocardial infarction     Obesity     NASREEN (obstructive sleep apnea) 6/5/2018    Prostate cancer 2015    Tobacco dependence     Type 2 diabetes mellitus        Surgical History:   Past Surgical History:   Procedure Laterality Date    COLONOSCOPY N/A 9/22/2020    Procedure: COLONOSCOPY;  Surgeon: Javier Farmer MD;  Location: [facility];  Service: Endoscopy;  Laterality: N/A;    CORONARY ARTERY BYPASS GRAFT  1987    EPIDURAL STEROID INJECTION N/A 11/22/2019    Procedure: Lumbar L5/S1 IL XUAN;  Surgeon: Tacho Trivedi MD;  Location: [facility];  Service: Pain Management;  Laterality: N/A;    EPIDURAL STEROID INJECTION N/A 1/6/2020    Procedure: Lumbar L5/S1 IL XUAN;  Surgeon: Tacho Trivedi MD;  Location: [facility];  Service: Pain Management;  Laterality: N/A;    EPIDURAL STEROID INJECTION N/A 2/10/2020    Procedure: Caudal XUAN;  Surgeon: Tacho Trivedi MD;  Location: [facility];  Service: Pain Management;  Laterality: N/A;    ESOPHAGOGASTRODUODENOSCOPY N/A 9/22/2020    Procedure: EGD (ESOPHAGOGASTRODUODENOSCOPY);  Surgeon: Javier Farmer MD;  Location: [facility];  Service: Endoscopy;  Laterality: N/A;    PROSTATE SURGERY      prostate radiation    SPINE SURGERY      fusion    TONSILLECTOMY         Family History:    Family History   Problem Relation Age of Onset    Heart attack Mother     Diabetes Mother     Heart disease Mother      Cataracts Mother     Stroke Father     Heart disease Father     Heart disease Brother        Social History:   Social History     Socioeconomic History    Marital status:      Spouse name: Not on file    Number of children: Not on file    Years of education: Not on file    Highest education level: Not on file   Occupational History     Employer: [employer]   Social Needs    Financial resource strain: Not hard at all    Food insecurity     Worry: Never true     Inability: Never true    Transportation needs     Medical: No     Non-medical: No   Tobacco Use    Smoking status: Former Smoker     Packs/day: 1.50     Years: 20.00     Pack years: 30.00     Types: Cigarettes     Start date:      Quit date:      Years since quittin.7    Smokeless tobacco: Never Used   Substance and Sexual Activity    Alcohol use: No     Frequency: Never     Binge frequency: Never    Drug use: No    Sexual activity: Not Currently   Lifestyle    Physical activity     Days per week: 0 days     Minutes per session: 30 min    Stress: Not at all   Relationships    Social connections     Talks on phone: Twice a week     Gets together: More than three times a week     Attends Quaker service: Not on file     Active member of club or organization: Yes     Attends meetings of clubs or organizations: More than 4 times per year     Relationship status:    Other Topics Concern    Not on file   Social History Narrative    Not on file       Allergies:   Review of patient's allergies indicates:  No Known Allergies    Home Medications:   Current Outpatient Medications on File Prior to Visit   Medication Sig Dispense Refill    acetaminophen (TYLENOL) 500 MG tablet Take 500 mg by mouth every 6 (six) hours as needed for Pain.      apixaban (ELIQUIS) 5 mg Tab Take 1 tablet (5 mg total) by mouth 2 (two) times daily. 60 tablet 3    aspirin (ECOTRIN) 81 MG EC tablet Take 81 mg by mouth.      blood sugar  diagnostic Strp Check blood glucose levels daily in the AM fasting and 1-2 times more daily. 300 strip 3    cholecalciferol, vitamin D3, (VITAMIN D3) 1,000 unit capsule Take 5,000 Units by mouth once daily.      clonazePAM (KLONOPIN) 1 MG tablet Take 1 tablet (1 mg total) by mouth every evening. 30 tablet 0    fish oil-omega-3 fatty acids 300-1,000 mg capsule Take 1 capsule by mouth once daily.      fluticasone propionate (FLONASE) 50 mcg/actuation nasal spray 2 sprays (100 mcg total) by Each Nostril route once daily. 48 g 1    lancets Misc Check blood glucose levels daily in the AM fasting and 1-2 times more daily. 300 each 3    multivitamin capsule Take 1 capsule by mouth once daily.      pantoprazole (PROTONIX) 40 MG tablet Take 1 tablet (40 mg total) by mouth once daily. 30 tablet 0    atorvastatin (LIPITOR) 80 MG tablet TK 1 T PO D  3    blood-glucose meter kit Use as instructed 1 each 0    ENTRESTO 24-26 mg per tablet       furosemide (LASIX) 40 MG tablet Take 1 tablet (40 mg total) by mouth once daily. Take extra dose as needed for swelling or weight gain 3 lbs (Patient not taking: Reported on 9/30/2020) 180 tablet 1    gabapentin (NEURONTIN) 300 MG capsule Take 2 capsules (600 mg total) by mouth every evening. Can take 3 caps qhs or tid if tolerated, may cause drowsiness (Patient not taking: Reported on 9/30/2020) 180 capsule 0    levocetirizine (XYZAL) 5 MG tablet Take 1 tablet (5 mg total) by mouth every evening. (Patient not taking: Reported on 9/30/2020) 90 tablet 1    nitroGLYCERIN (NITROSTAT) 0.3 MG SL tablet PLACE 1 TABLET UNDER THE TONGUE EVERY 5 MINUTES AS NEEDED FOR CHEST PAIN AS DIRECTED  0     Current Facility-Administered Medications on File Prior to Visit   Medication Dose Route Frequency Provider Last Rate Last Dose    ondansetron injection 4 mg  4 mg Intravenous Once PRN Tacho Trivedi MD        ondansetron injection 4 mg  4 mg Intravenous Once PRN Tacho Trivedi MD      "      Physical Exam:  /62   Pulse 85   Ht 5' 9" (1.753 m)   Wt 99.7 kg (219 lb 12.8 oz)   SpO2 98%   BMI 32.46 kg/m²   Body mass index is 32.46 kg/m².  Physical Exam  Constitutional:       General: He is not in acute distress.     Appearance: Normal appearance. He is not ill-appearing, toxic-appearing or diaphoretic.   HENT:      Head: Normocephalic and atraumatic.   Eyes:      Extraocular Movements: Extraocular movements intact.   Neck:      Musculoskeletal: Normal range of motion.   Cardiovascular:      Rate and Rhythm: Normal rate and regular rhythm.      Heart sounds: No murmur.   Pulmonary:      Effort: Pulmonary effort is normal. No respiratory distress.      Breath sounds: Normal breath sounds. No wheezing.   Abdominal:      General: Bowel sounds are normal. There is no distension.      Palpations: Abdomen is soft. There is no mass.      Tenderness: There is no abdominal tenderness. There is no guarding or rebound.   Musculoskeletal: Normal range of motion.   Skin:     General: Skin is warm and dry.      Capillary Refill: Capillary refill takes 2 to 3 seconds.      Coloration: Skin is not jaundiced or pale.      Findings: No erythema.   Neurological:      General: No focal deficit present.      Mental Status: He is alert and oriented to person, place, and time.   Psychiatric:         Mood and Affect: Mood normal.         Behavior: Behavior normal.         Thought Content: Thought content normal.         Judgment: Judgment normal.           Labs: Pertinent labs reviewed.  Endoscopy: 2020  CRC Screenin2020 diagnostic  Anticoagulation: Eliquis and Aspirin    Assessment:  1. Anemia, unspecified type    2. Lower GI bleed         Recommendations:  -EGD and colonoscopy show no sign of recent or active bleeding. Biopsies only show focal acute gastritis.  -Would normally recommend VCE, but patient with pacemaker. Reached out to endoscopy who confirms VCE with pacemaker is contraindicated.  -Reached " out to Dr. Bob who completed scopes on patient. He recommends monitoring with labs. Patient is not currently seeing any blood loss. Stools normal.  -Will have standing order for CBC once per week x 3 weeks.  -Instructed ER with any worsening signs or symptoms. Patient verbalized understanding.    Anemia, unspecified type  -     CBC auto differential; Standing    Lower GI bleed  -     Ambulatory referral/consult to Gastroenterology  -     CBC auto differential; Standing        No follow-ups on file.    Thank you so much for allowing me to participate in the care of Jake Ramesh PA-C

## 2020-10-02 NOTE — LETTER
October 2, 2020      Chanda Brush MD  [address] [facility] [address]           [facility]  [address] [place] [id number]  Phone: [phone]  Fax: [phone]          Patient: Jake Ortiz   MR Number: [id number]   YOB: 1940   Date of Visit: 10/2/2020       Dear Dr. Chanda Brush:    Thank you for referring Jake Ortiz to me for evaluation. Attached you will find relevant portions of my assessment and plan of care.    If you have questions, please do not hesitate to call me. I look forward to following Jake Ortiz along with you.    Sincerely,    Gaye Ramesh PA-C    Enclosure  CC:  No Recipients    If you would like to receive this communication electronically, please contact [email or web address] or [phone] to request more information on [employer] Link access.    For providers and/or their staff who would like to refer a patient to [facility], please contact us through our one-stop-shop provider referral line, [facility] , at [phone].    If you feel you have received this communication in error or would no longer like to receive these types of communications, please e-mail [email or web address]

## 2020-10-05 ENCOUNTER — NURSE TRIAGE (OUTPATIENT)
Dept: ADMINISTRATIVE | Facility: CLINIC | Age: 80
End: 2020-10-05

## 2020-10-05 ENCOUNTER — TELEPHONE (OUTPATIENT)
Dept: GASTROENTEROLOGY | Facility: CLINIC | Age: 80
End: 2020-10-05

## 2020-10-05 NOTE — TELEPHONE ENCOUNTER
Patient informed that can not do pill cam with pacemaker. Patient informed that after consulting with Dr Llanos it is advised to monitor at this time. Standing orders have been placed for blood work for the next three weeks to watch hemoglobin. Patient verbalized understanding.

## 2020-10-05 NOTE — TELEPHONE ENCOUNTER
----- Message from Gaye Ramesh PA-C sent at 10/2/2020  9:45 AM CDT -----  Please call and let him know endoscopy has confirmed he can not have video capsule due to pacemaker. I have also reached out to Dr. Bob who completed his scopes. HE recommends monitoring at this time. I have placed a standing order for weekly CBC for next 3 weeks. We will see how his hemoglobin trends during this time. Reiterate ER with worsening symptoms and document.    Thank you!  Gaye

## 2020-10-06 ENCOUNTER — LAB VISIT (OUTPATIENT)
Dept: LAB | Facility: HOSPITAL | Age: 80
End: 2020-10-06
Attending: PHYSICIAN ASSISTANT
Payer: MEDICARE

## 2020-10-06 DIAGNOSIS — K92.2 LOWER GI BLEED: ICD-10-CM

## 2020-10-06 DIAGNOSIS — D64.9 ANEMIA, UNSPECIFIED TYPE: ICD-10-CM

## 2020-10-06 LAB
BASOPHILS # BLD AUTO: 0.04 K/UL (ref 0–0.2)
BASOPHILS NFR BLD: 0.8 % (ref 0–1.9)
DIFFERENTIAL METHOD: ABNORMAL
EOSINOPHIL # BLD AUTO: 0.2 K/UL (ref 0–0.5)
EOSINOPHIL NFR BLD: 2.9 % (ref 0–8)
ERYTHROCYTE [DISTWIDTH] IN BLOOD BY AUTOMATED COUNT: 14.9 % (ref 11.5–14.5)
HCT VFR BLD AUTO: 26.9 % (ref 40–54)
HGB BLD-MCNC: 8.2 G/DL (ref 14–18)
IMM GRANULOCYTES # BLD AUTO: 0.02 K/UL (ref 0–0.04)
IMM GRANULOCYTES NFR BLD AUTO: 0.4 % (ref 0–0.5)
LYMPHOCYTES # BLD AUTO: 0.9 K/UL (ref 1–4.8)
LYMPHOCYTES NFR BLD: 17 % (ref 18–48)
MCH RBC QN AUTO: 27.9 PG (ref 27–31)
MCHC RBC AUTO-ENTMCNC: 30.5 G/DL (ref 32–36)
MCV RBC AUTO: 92 FL (ref 82–98)
MONOCYTES # BLD AUTO: 0.5 K/UL (ref 0.3–1)
MONOCYTES NFR BLD: 10 % (ref 4–15)
NEUTROPHILS # BLD AUTO: 3.6 K/UL (ref 1.8–7.7)
NEUTROPHILS NFR BLD: 68.9 % (ref 38–73)
NRBC BLD-RTO: 0 /100 WBC
PLATELET # BLD AUTO: 431 K/UL (ref 150–350)
PMV BLD AUTO: 10.1 FL (ref 9.2–12.9)
RBC # BLD AUTO: 2.94 M/UL (ref 4.6–6.2)
WBC # BLD AUTO: 5.22 K/UL (ref 3.9–12.7)

## 2020-10-06 PROCEDURE — 36415 COLL VENOUS BLD VENIPUNCTURE: CPT | Mod: HCNC,PO

## 2020-10-06 PROCEDURE — 85025 COMPLETE CBC W/AUTO DIFF WBC: CPT | Mod: HCNC

## 2020-10-07 ENCOUNTER — PATIENT MESSAGE (OUTPATIENT)
Dept: GASTROENTEROLOGY | Facility: CLINIC | Age: 80
End: 2020-10-07

## 2020-10-07 ENCOUNTER — CLINICAL SUPPORT (OUTPATIENT)
Dept: CARDIOLOGY | Facility: HOSPITAL | Age: 80
End: 2020-10-07
Payer: MEDICARE

## 2020-10-07 DIAGNOSIS — Z95.0 PRESENCE OF CARDIAC PACEMAKER: ICD-10-CM

## 2020-10-07 PROCEDURE — 93296 REM INTERROG EVL PM/IDS: CPT | Mod: HCNC | Performed by: INTERNAL MEDICINE

## 2020-10-07 PROCEDURE — 93294 CARDIAC DEVICE CHECK - REMOTE: ICD-10-PCS | Mod: HCNC,S$GLB,, | Performed by: INTERNAL MEDICINE

## 2020-10-07 PROCEDURE — 93294 REM INTERROG EVL PM/LDLS PM: CPT | Mod: HCNC,S$GLB,, | Performed by: INTERNAL MEDICINE

## 2020-10-07 NOTE — PRE-PROCEDURE INSTRUCTIONS
Attempted to PAT patient for procedure on 10.12 with Dr. fuentes  . No answer. M with return phone number. No return call at this time.

## 2020-10-08 NOTE — PRE-PROCEDURE INSTRUCTIONS
Spoke with patient regarding procedure scheduled on 10/12    Arrival time 0930    Has patient been sick with fever or on antibiotics within the last 7 days? No    Has patient received a vaccination within the last 7 days? no    Has the patient stopped all medications as directed? Na    Does patient have a pacemaker and or defibrillator? pacemaker    Does the patient have a ride to and from procedure and someone reliable to remain with patient? Wife laila    Is the patient diabetic? yes    Does the patient have sleep apnea? Or use O2 at home? NASREEN    Is the patient receiving sedation? yes    Is the patient instructed to remain NPO beginning at midnight the night before their procedure? yes    Procedure location confirmed with patient? Yes    Covid- Denies signs/symptoms. Instructed to notify PAT/MD if any changes.

## 2020-10-12 ENCOUNTER — HOSPITAL ENCOUNTER (OUTPATIENT)
Facility: HOSPITAL | Age: 80
Discharge: HOME | End: 2020-10-12
Attending: PHYSICAL MEDICINE & REHABILITATION | Admitting: PHYSICAL MEDICINE & REHABILITATION
Payer: MEDICARE

## 2020-10-12 VITALS
BODY MASS INDEX: 31.25 KG/M2 | OXYGEN SATURATION: 97 % | WEIGHT: 211 LBS | SYSTOLIC BLOOD PRESSURE: 150 MMHG | RESPIRATION RATE: 18 BRPM | HEIGHT: 69 IN | TEMPERATURE: 98 F | DIASTOLIC BLOOD PRESSURE: 73 MMHG | HEART RATE: 64 BPM

## 2020-10-12 DIAGNOSIS — M47.816 SPONDYLOSIS WITHOUT MYELOPATHY OR RADICULOPATHY, LUMBAR REGION: ICD-10-CM

## 2020-10-12 LAB — POCT GLUCOSE: 96 MG/DL (ref 70–110)

## 2020-10-12 PROCEDURE — 63600175 PHARM REV CODE 636 W HCPCS: Mod: HCNC | Performed by: PHYSICAL MEDICINE & REHABILITATION

## 2020-10-12 PROCEDURE — 64494 INJ PARAVERT F JNT L/S 2 LEV: CPT | Mod: 50,HCNC | Performed by: PHYSICAL MEDICINE & REHABILITATION

## 2020-10-12 PROCEDURE — S0020 INJECTION, BUPIVICAINE HYDRO: HCPCS | Mod: HCNC | Performed by: PHYSICAL MEDICINE & REHABILITATION

## 2020-10-12 PROCEDURE — 64493 INJ PARAVERT F JNT L/S 1 LEV: CPT | Mod: 50,HCNC | Performed by: PHYSICAL MEDICINE & REHABILITATION

## 2020-10-12 PROCEDURE — 64493 INJ PARAVERT F JNT L/S 1 LEV: CPT | Mod: 50,HCNC,, | Performed by: PHYSICAL MEDICINE & REHABILITATION

## 2020-10-12 PROCEDURE — 64494 INJ PARAVERT F JNT L/S 2 LEV: CPT | Mod: 50,HCNC,, | Performed by: PHYSICAL MEDICINE & REHABILITATION

## 2020-10-12 PROCEDURE — 25000003 PHARM REV CODE 250: Mod: HCNC | Performed by: PHYSICAL MEDICINE & REHABILITATION

## 2020-10-12 PROCEDURE — 64494 PR INJ DX/THER AGNT PARAVERT FACET JOINT,IMG GUIDE,LUMBAR/SAC, 2ND LEVEL: ICD-10-PCS | Mod: 50,HCNC,, | Performed by: PHYSICAL MEDICINE & REHABILITATION

## 2020-10-12 PROCEDURE — 64493 PR INJ DX/THER AGNT PARAVERT FACET JOINT,IMG GUIDE,LUMBAR/SAC,1ST LVL: ICD-10-PCS | Mod: 50,HCNC,, | Performed by: PHYSICAL MEDICINE & REHABILITATION

## 2020-10-12 PROCEDURE — 82962 GLUCOSE BLOOD TEST: CPT | Mod: HCNC | Performed by: PHYSICAL MEDICINE & REHABILITATION

## 2020-10-12 RX ORDER — LIDOCAINE HYDROCHLORIDE 10 MG/ML
INJECTION, SOLUTION EPIDURAL; INFILTRATION; INTRACAUDAL; PERINEURAL
Status: DISCONTINUED | OUTPATIENT
Start: 2020-10-12 | End: 2020-10-12 | Stop reason: HOSPADM

## 2020-10-12 RX ORDER — FENTANYL CITRATE 50 UG/ML
INJECTION, SOLUTION INTRAMUSCULAR; INTRAVENOUS
Status: DISCONTINUED | OUTPATIENT
Start: 2020-10-12 | End: 2020-10-12 | Stop reason: HOSPADM

## 2020-10-12 RX ORDER — MIDAZOLAM HYDROCHLORIDE 1 MG/ML
INJECTION, SOLUTION INTRAMUSCULAR; INTRAVENOUS
Status: DISCONTINUED | OUTPATIENT
Start: 2020-10-12 | End: 2020-10-12 | Stop reason: HOSPADM

## 2020-10-12 RX ORDER — BUPIVACAINE HYDROCHLORIDE 5 MG/ML
INJECTION, SOLUTION EPIDURAL; INTRACAUDAL
Status: DISCONTINUED | OUTPATIENT
Start: 2020-10-12 | End: 2020-10-12 | Stop reason: HOSPADM

## 2020-10-12 RX ORDER — ONDANSETRON 2 MG/ML
4 INJECTION INTRAMUSCULAR; INTRAVENOUS ONCE AS NEEDED
Status: DISCONTINUED | OUTPATIENT
Start: 2020-10-12 | End: 2020-12-20

## 2020-10-12 NOTE — DISCHARGE INSTRUCTIONS

## 2020-10-12 NOTE — PLAN OF CARE
Patient prepped for procedure. Wife waiting in lobby, no phone, will update at end of procedure in person.

## 2020-10-12 NOTE — DISCHARGE SUMMARY
Discharge Note  Short Stay      SUMMARY     Admit Date: 10/12/2020    Attending Physician: Tacho Trivedi MD        Discharge Physician: Tacho Trivedi MD        Discharge Date: 10/12/2020 10:37 AM    Procedure(s) (LRB):  Bilateral L3-5 MBB (Bilateral)    Final Diagnosis: Spondylosis without myelopathy or radiculopathy, lumbar region [M47.816]    Disposition: Home or self care    Patient Instructions:   Current Discharge Medication List      CONTINUE these medications which have NOT CHANGED    Details   acetaminophen (TYLENOL) 500 MG tablet Take 500 mg by mouth every 6 (six) hours as needed for Pain.      apixaban (ELIQUIS) 5 mg Tab Take 1 tablet (5 mg total) by mouth 2 (two) times daily.  Qty: 60 tablet, Refills: 3    Associated Diagnoses: PAF (paroxysmal atrial fibrillation)      aspirin (ECOTRIN) 81 MG EC tablet Take 81 mg by mouth.      atorvastatin (LIPITOR) 80 MG tablet TK 1 T PO D  Refills: 3      cholecalciferol, vitamin D3, (VITAMIN D3) 1,000 unit capsule Take 5,000 Units by mouth once daily.      ENTRESTO 24-26 mg per tablet       fish oil-omega-3 fatty acids 300-1,000 mg capsule Take 1 capsule by mouth once daily.      fluticasone propionate (FLONASE) 50 mcg/actuation nasal spray 2 sprays (100 mcg total) by Each Nostril route once daily.  Qty: 48 g, Refills: 1    Associated Diagnoses: Allergic rhinitis, unspecified seasonality, unspecified trigger      gabapentin (NEURONTIN) 300 MG capsule Take 2 capsules (600 mg total) by mouth every evening. Can take 3 caps qhs or tid if tolerated, may cause drowsiness  Qty: 180 capsule, Refills: 0      levocetirizine (XYZAL) 5 MG tablet Take 1 tablet (5 mg total) by mouth every evening.  Qty: 90 tablet, Refills: 1    Associated Diagnoses: Allergic rhinitis, unspecified seasonality, unspecified trigger      multivitamin capsule Take 1 capsule by mouth once daily.      pantoprazole (PROTONIX) 40 MG tablet Take 1 tablet (40 mg total) by mouth once daily.  Qty: 30  tablet, Refills: 0      blood sugar diagnostic Strp Check blood glucose levels daily in the AM fasting and 1-2 times more daily.  Qty: 300 strip, Refills: 3    Associated Diagnoses: Diabetes mellitus type 2 in obese      blood-glucose meter kit Use as instructed  Qty: 1 each, Refills: 0    Associated Diagnoses: Diabetes mellitus type 2 in obese      clonazePAM (KLONOPIN) 1 MG tablet Take 1 tablet (1 mg total) by mouth every evening.  Qty: 30 tablet, Refills: 0    Associated Diagnoses: Periodic limb movement disorder (PLMD)      lancets Misc Check blood glucose levels daily in the AM fasting and 1-2 times more daily.  Qty: 300 each, Refills: 3    Associated Diagnoses: Diabetes mellitus type 2 in obese         STOP taking these medications       furosemide (LASIX) 40 MG tablet Comments:   Reason for Stopping:         nitroGLYCERIN (NITROSTAT) 0.3 MG SL tablet Comments:   Reason for Stopping:                   Discharge Diagnosis: Spondylosis without myelopathy or radiculopathy, lumbar region [M47.816]  Condition on Discharge: Stable with no complications to procedure   Diet on Discharge: Same as before.  Activity: as per instruction sheet.  Discharge to: Home with a responsible adult.  Follow up: 2-4 weeks       Please call the office if you experience any weakness or loss of sensation, fever > 101.5, pain uncontrolled with oral medications, persistent nausea/vomiting/or diarrhea, redness or drainage from the incisions, or any other worrisome concerns. If physician on call was not reached or could not communicate with our office for any reason please go to the nearest emergency department

## 2020-10-12 NOTE — OP NOTE
LUMBAR Medial Branch Block Under Fluoroscopy  Time-out taken to identify patient and procedure side prior to starting the procedure.            10/12/2020                                                         PROCEDURE:  Bilateral medial branch block at the transverse processes at the level of L4, L5, Sacral ala    REASON FOR PROCEDURE: Spondylosis without myelopathy or radiculopathy, lumbar region [M47.816]    PHYSICIAN: Tacho Trivedi MD  ASSISTANTS: None    SEDATION MEDICATIONS: Conscious sedation provided by M.D    The patient was monitored with continuous pulse oximetry, EKG, and intermittent blood pressure monitors.  The patient was hemodynamically stable throughout the entire process was responsive to voice, and breathing spontaneously.  Supplemental O2 was provided at 2L/min via nasal cannula.  Patient was comfortable for the duration of the procedure. (See nurse documentation and case log for sedation time)    There was a total of 1mg IV Midazolam and 25mcg Fentanyl titrated for the procedure      MEDICATIONS INJECTED: 0.5% bupivicane, 1mL at each level    LOCAL ANESTHETIC USED: Xylocaine 1% 10ml    ESTIMATED BLOOD LOSS:  None.    COMPLICATIONS:  None.    TECHNIQUE: Laying in a prone position, the patient was prepped and draped in the usual sterile fashion using ChloraPrep and fenestrated drape.  The level was determined under fluoroscopic guidance.  Local anesthetic was given by going down to the hub of the 27-gauge 1.25in needle and raising a wheel.  A 22-gauge 3.5inch needle was introduced to the anatomic local of the medial branch at each of the above levels using fluoroscopy in the AP, oblique, and lateral views.  After negative aspiration, medication was injected slowly. The patient tolerated the procedure well.       The patient was monitored after the procedure.  Patient was given post procedure and discharge instructions to follow at home.  We will see the patient back in two weeks or the  patient may call to inform of status. The patient was discharged in a stable condition

## 2020-10-13 ENCOUNTER — TELEPHONE (OUTPATIENT)
Dept: PAIN MEDICINE | Facility: CLINIC | Age: 80
End: 2020-10-13

## 2020-10-13 ENCOUNTER — LAB VISIT (OUTPATIENT)
Dept: LAB | Facility: HOSPITAL | Age: 80
End: 2020-10-13
Attending: FAMILY MEDICINE
Payer: MEDICARE

## 2020-10-13 DIAGNOSIS — K92.2 LOWER GI BLEED: ICD-10-CM

## 2020-10-13 DIAGNOSIS — R53.83 FATIGUE, UNSPECIFIED TYPE: ICD-10-CM

## 2020-10-13 DIAGNOSIS — E86.0 DEHYDRATION: ICD-10-CM

## 2020-10-13 DIAGNOSIS — D64.9 ANEMIA, UNSPECIFIED TYPE: ICD-10-CM

## 2020-10-13 LAB
ANION GAP SERPL CALC-SCNC: 14 MMOL/L (ref 8–16)
BASOPHILS # BLD AUTO: 0.05 K/UL (ref 0–0.2)
BASOPHILS NFR BLD: 0.8 % (ref 0–1.9)
BUN SERPL-MCNC: 19 MG/DL (ref 8–23)
CALCIUM SERPL-MCNC: 9.1 MG/DL (ref 8.7–10.5)
CHLORIDE SERPL-SCNC: 101 MMOL/L (ref 95–110)
CO2 SERPL-SCNC: 23 MMOL/L (ref 23–29)
CREAT SERPL-MCNC: 1.7 MG/DL (ref 0.5–1.4)
DIFFERENTIAL METHOD: ABNORMAL
EOSINOPHIL # BLD AUTO: 0.2 K/UL (ref 0–0.5)
EOSINOPHIL NFR BLD: 4 % (ref 0–8)
ERYTHROCYTE [DISTWIDTH] IN BLOOD BY AUTOMATED COUNT: 15.2 % (ref 11.5–14.5)
EST. GFR  (AFRICAN AMERICAN): 43.7 ML/MIN/1.73 M^2
EST. GFR  (NON AFRICAN AMERICAN): 37.8 ML/MIN/1.73 M^2
GLUCOSE SERPL-MCNC: 219 MG/DL (ref 70–110)
HCT VFR BLD AUTO: 27.8 % (ref 40–54)
HGB BLD-MCNC: 8.3 G/DL (ref 14–18)
IMM GRANULOCYTES # BLD AUTO: 0.02 K/UL (ref 0–0.04)
IMM GRANULOCYTES NFR BLD AUTO: 0.3 % (ref 0–0.5)
LYMPHOCYTES # BLD AUTO: 1 K/UL (ref 1–4.8)
LYMPHOCYTES NFR BLD: 17.5 % (ref 18–48)
MCH RBC QN AUTO: 27.3 PG (ref 27–31)
MCHC RBC AUTO-ENTMCNC: 29.9 G/DL (ref 32–36)
MCV RBC AUTO: 91 FL (ref 82–98)
MONOCYTES # BLD AUTO: 0.5 K/UL (ref 0.3–1)
MONOCYTES NFR BLD: 7.9 % (ref 4–15)
NEUTROPHILS # BLD AUTO: 4.1 K/UL (ref 1.8–7.7)
NEUTROPHILS NFR BLD: 69.5 % (ref 38–73)
NRBC BLD-RTO: 0 /100 WBC
PLATELET # BLD AUTO: 437 K/UL (ref 150–350)
PMV BLD AUTO: 10.3 FL (ref 9.2–12.9)
POTASSIUM SERPL-SCNC: 3.8 MMOL/L (ref 3.5–5.1)
RBC # BLD AUTO: 3.04 M/UL (ref 4.6–6.2)
SODIUM SERPL-SCNC: 138 MMOL/L (ref 136–145)
WBC # BLD AUTO: 5.94 K/UL (ref 3.9–12.7)

## 2020-10-13 PROCEDURE — 85025 COMPLETE CBC W/AUTO DIFF WBC: CPT | Mod: HCNC

## 2020-10-13 PROCEDURE — 36415 COLL VENOUS BLD VENIPUNCTURE: CPT | Mod: HCNC,PO

## 2020-10-13 PROCEDURE — 80048 BASIC METABOLIC PNL TOTAL CA: CPT | Mod: HCNC

## 2020-10-13 NOTE — TELEPHONE ENCOUNTER
Tried to call to get % relief from inj on 10/12 with  . No answer. Left   Alexis Boyd, MA [facility]   [facility]

## 2020-10-14 DIAGNOSIS — E86.0 DEHYDRATION: Primary | ICD-10-CM

## 2020-10-15 ENCOUNTER — LAB VISIT (OUTPATIENT)
Dept: LAB | Facility: HOSPITAL | Age: 80
End: 2020-10-15
Attending: FAMILY MEDICINE
Payer: MEDICARE

## 2020-10-15 DIAGNOSIS — E86.0 DEHYDRATION: ICD-10-CM

## 2020-10-15 LAB
ANION GAP SERPL CALC-SCNC: 12 MMOL/L (ref 8–16)
BUN SERPL-MCNC: 20 MG/DL (ref 8–23)
CALCIUM SERPL-MCNC: 9.2 MG/DL (ref 8.7–10.5)
CHLORIDE SERPL-SCNC: 103 MMOL/L (ref 95–110)
CO2 SERPL-SCNC: 26 MMOL/L (ref 23–29)
CREAT SERPL-MCNC: 1.6 MG/DL (ref 0.5–1.4)
EST. GFR  (AFRICAN AMERICAN): 47 ML/MIN/1.73 M^2
EST. GFR  (NON AFRICAN AMERICAN): 40.7 ML/MIN/1.73 M^2
GLUCOSE SERPL-MCNC: 172 MG/DL (ref 70–110)
POTASSIUM SERPL-SCNC: 4 MMOL/L (ref 3.5–5.1)
SODIUM SERPL-SCNC: 141 MMOL/L (ref 136–145)

## 2020-10-15 PROCEDURE — 80048 BASIC METABOLIC PNL TOTAL CA: CPT | Mod: HCNC

## 2020-10-15 PROCEDURE — 36415 COLL VENOUS BLD VENIPUNCTURE: CPT | Mod: HCNC,PO

## 2020-10-19 ENCOUNTER — PATIENT MESSAGE (OUTPATIENT)
Dept: PAIN MEDICINE | Facility: CLINIC | Age: 80
End: 2020-10-19

## 2020-10-19 NOTE — TELEPHONE ENCOUNTER
Patient Name:___________Jkae BRUNER Ourso______________________MRN:  [id number]       underwent the following procedure:__________ Lumbar Medial Branch Block  _with______Tacho Trivedi MD on: _10/12/2020__ and  received __50_% RELIEF.      Patient states that some parts of his back he has no pain. Its just that one spot that's hurting him. Patient states that he feels great otherwise.

## 2020-10-21 ENCOUNTER — PATIENT MESSAGE (OUTPATIENT)
Dept: FAMILY MEDICINE | Facility: CLINIC | Age: 80
End: 2020-10-21

## 2020-10-21 DIAGNOSIS — G47.61 PERIODIC LIMB MOVEMENT DISORDER (PLMD): ICD-10-CM

## 2020-10-21 RX ORDER — CLONAZEPAM 1 MG/1
1 TABLET ORAL NIGHTLY
Qty: 30 TABLET | Refills: 0 | Status: SHIPPED | OUTPATIENT
Start: 2020-10-21 | End: 2020-12-07 | Stop reason: SDUPTHER

## 2020-10-23 ENCOUNTER — HOSPITAL ENCOUNTER (EMERGENCY)
Facility: HOSPITAL | Age: 80
Discharge: HOME | End: 2020-10-23
Attending: EMERGENCY MEDICINE
Payer: MEDICARE

## 2020-10-23 ENCOUNTER — PATIENT MESSAGE (OUTPATIENT)
Dept: CARDIOLOGY | Facility: CLINIC | Age: 80
End: 2020-10-23

## 2020-10-23 VITALS
BODY MASS INDEX: 31.42 KG/M2 | RESPIRATION RATE: 18 BRPM | TEMPERATURE: 98 F | OXYGEN SATURATION: 100 % | HEART RATE: 62 BPM | DIASTOLIC BLOOD PRESSURE: 63 MMHG | WEIGHT: 212.75 LBS | SYSTOLIC BLOOD PRESSURE: 133 MMHG

## 2020-10-23 DIAGNOSIS — R42 DIZZINESS: ICD-10-CM

## 2020-10-23 DIAGNOSIS — D64.9 SYMPTOMATIC ANEMIA: ICD-10-CM

## 2020-10-23 DIAGNOSIS — Z79.01 CHRONIC ANTICOAGULATION: ICD-10-CM

## 2020-10-23 DIAGNOSIS — D64.9 ACUTE ON CHRONIC ANEMIA: Primary | ICD-10-CM

## 2020-10-23 LAB
ABO + RH BLD: NORMAL
ALBUMIN SERPL BCP-MCNC: 4.1 G/DL (ref 3.5–5.2)
ALP SERPL-CCNC: 66 U/L (ref 55–135)
ALT SERPL W/O P-5'-P-CCNC: 9 U/L (ref 10–44)
ANION GAP SERPL CALC-SCNC: 14 MMOL/L (ref 8–16)
APTT BLDCRRT: 30.7 SEC (ref 21–32)
AST SERPL-CCNC: 15 U/L (ref 10–40)
BASOPHILS # BLD AUTO: 0.05 K/UL (ref 0–0.2)
BASOPHILS NFR BLD: 0.7 % (ref 0–1.9)
BILIRUB SERPL-MCNC: 0.4 MG/DL (ref 0.1–1)
BLD GP AB SCN CELLS X3 SERPL QL: NORMAL
BLD PROD TYP BPU: NORMAL
BLOOD UNIT EXPIRATION DATE: NORMAL
BLOOD UNIT TYPE CODE: 5100
BLOOD UNIT TYPE: NORMAL
BUN SERPL-MCNC: 45 MG/DL (ref 8–23)
CALCIUM SERPL-MCNC: 8.9 MG/DL (ref 8.7–10.5)
CHLORIDE SERPL-SCNC: 101 MMOL/L (ref 95–110)
CO2 SERPL-SCNC: 25 MMOL/L (ref 23–29)
CODING SYSTEM: NORMAL
CREAT SERPL-MCNC: 2.2 MG/DL (ref 0.5–1.4)
DIFFERENTIAL METHOD: ABNORMAL
DISPENSE STATUS: NORMAL
EOSINOPHIL # BLD AUTO: 0.1 K/UL (ref 0–0.5)
EOSINOPHIL NFR BLD: 0.7 % (ref 0–8)
ERYTHROCYTE [DISTWIDTH] IN BLOOD BY AUTOMATED COUNT: 15.5 % (ref 11.5–14.5)
EST. GFR  (AFRICAN AMERICAN): 32 ML/MIN/1.73 M^2
EST. GFR  (NON AFRICAN AMERICAN): 28 ML/MIN/1.73 M^2
GLUCOSE SERPL-MCNC: 116 MG/DL (ref 70–110)
HCT VFR BLD AUTO: 23.6 % (ref 40–54)
HGB BLD-MCNC: 7 G/DL (ref 14–18)
IMM GRANULOCYTES # BLD AUTO: 0.04 K/UL (ref 0–0.04)
IMM GRANULOCYTES NFR BLD AUTO: 0.5 % (ref 0–0.5)
INR PPP: 1.2 (ref 0.8–1.2)
LYMPHOCYTES # BLD AUTO: 0.9 K/UL (ref 1–4.8)
LYMPHOCYTES NFR BLD: 12.5 % (ref 18–48)
MCH RBC QN AUTO: 26.3 PG (ref 27–31)
MCHC RBC AUTO-ENTMCNC: 29.7 G/DL (ref 32–36)
MCV RBC AUTO: 89 FL (ref 82–98)
MONOCYTES # BLD AUTO: 0.7 K/UL (ref 0.3–1)
MONOCYTES NFR BLD: 9.2 % (ref 4–15)
NEUTROPHILS # BLD AUTO: 5.7 K/UL (ref 1.8–7.7)
NEUTROPHILS NFR BLD: 76.4 % (ref 38–73)
NRBC BLD-RTO: 0 /100 WBC
NUM UNITS TRANS PACKED RBC: NORMAL
PLATELET # BLD AUTO: 333 K/UL (ref 150–350)
PMV BLD AUTO: 9.7 FL (ref 9.2–12.9)
POTASSIUM SERPL-SCNC: 3.9 MMOL/L (ref 3.5–5.1)
PROT SERPL-MCNC: 7.6 G/DL (ref 6–8.4)
PROTHROMBIN TIME: 12.8 SEC (ref 9–12.5)
RBC # BLD AUTO: 2.66 M/UL (ref 4.6–6.2)
SODIUM SERPL-SCNC: 140 MMOL/L (ref 136–145)
TROPONIN I SERPL DL<=0.01 NG/ML-MCNC: 0.02 NG/ML (ref 0–0.03)
WBC # BLD AUTO: 7.46 K/UL (ref 3.9–12.7)

## 2020-10-23 PROCEDURE — 93005 ELECTROCARDIOGRAM TRACING: CPT | Mod: HCNC

## 2020-10-23 PROCEDURE — 80053 COMPREHEN METABOLIC PANEL: CPT | Mod: HCNC

## 2020-10-23 PROCEDURE — 86920 COMPATIBILITY TEST SPIN: CPT | Mod: HCNC

## 2020-10-23 PROCEDURE — 36430 TRANSFUSION BLD/BLD COMPNT: CPT | Mod: HCNC

## 2020-10-23 PROCEDURE — 85610 PROTHROMBIN TIME: CPT | Mod: HCNC

## 2020-10-23 PROCEDURE — P9016 RBC LEUKOCYTES REDUCED: HCPCS | Mod: HCNC

## 2020-10-23 PROCEDURE — 84484 ASSAY OF TROPONIN QUANT: CPT | Mod: HCNC

## 2020-10-23 PROCEDURE — 93010 EKG 12-LEAD: ICD-10-PCS | Mod: HCNC,,, | Performed by: INTERNAL MEDICINE

## 2020-10-23 PROCEDURE — 85025 COMPLETE CBC W/AUTO DIFF WBC: CPT | Mod: HCNC

## 2020-10-23 PROCEDURE — 93010 ELECTROCARDIOGRAM REPORT: CPT | Mod: HCNC,,, | Performed by: INTERNAL MEDICINE

## 2020-10-23 PROCEDURE — 86901 BLOOD TYPING SEROLOGIC RH(D): CPT | Mod: HCNC

## 2020-10-23 PROCEDURE — 99291 CRITICAL CARE FIRST HOUR: CPT | Mod: 25,HCNC

## 2020-10-23 PROCEDURE — 85730 THROMBOPLASTIN TIME PARTIAL: CPT | Mod: HCNC

## 2020-10-23 RX ORDER — HYDROCODONE BITARTRATE AND ACETAMINOPHEN 500; 5 MG/1; MG/1
TABLET ORAL
Status: DISCONTINUED | OUTPATIENT
Start: 2020-10-23 | End: 2020-10-23 | Stop reason: HOSPADM

## 2020-10-23 NOTE — ED PROVIDER NOTES
SCRIBE #1 NOTE: I, Eliana Barakat, am scribing for, and in the presence of, Diamond Mann MD. I have scribed the entire note.       History     Chief Complaint   Patient presents with    Fatigue     reports generalized weakness onset 8am with blurry vision; states exact same symptoms as he had the last time he needs 4units of blod      Review of patient's allergies indicates:  No Known Allergies      History of Present Illness     HPI    10/23/2020, 12:44 PM  History obtained from the patient      History of Present Illness: Jake Ortiz is a 80 y.o. male patient with a h/o anemia, arthritis, atrial fibrillation, congestive heart failure, CAD, DM, HLD, HTN, MI, obesity, NASREEN, prostate cancer, who presents to the Emergency Department for evaluation of fatigue which onset this morning around 0800. Pt states that he experienced the exact same sxs previously when he needed to have 4 units of blood administered during his hospital admission about a month ago. Symptoms are constant and moderate in severity. No mitigating or exacerbating factors reported. Associated sxs include generalized weakness, dizziness, and mild blurry vision. Patient denies any fever, chills, n/v, black stools, blood in stool, dysuria, hematuria, rectal bleeding, SOB, CP, HA, light-headedness, and all other sxs at this time. No prior Tx reported. Pt is currently on Eliquis. No further complaints or concerns at this time.       Arrival mode: Personal transportation     PCP: Chanda Brush MD      Past Medical History:  Past Medical History:   Diagnosis Date    Abnormal PFT     Anemia     Arthritis     Atrial fibrillation 10/19/2017    Back pain     Congestive heart failure 3/5/2018    Coronary artery disease     Diabetes mellitus 01/2018     am 02/27/2018    DM (diabetes mellitus) 2018     am 06/23/2020    Hyperlipemia     Hypertension     Myocardial infarction     Obesity     NASREEN (obstructive sleep  apnea) 2018    Prostate cancer 2015    Tobacco dependence     Type 2 diabetes mellitus        Past Surgical History:  Past Surgical History:   Procedure Laterality Date    COLONOSCOPY N/A 2020    Procedure: COLONOSCOPY;  Surgeon: Javier Farmer MD;  Location: [facility] ENDO;  Service: Endoscopy;  Laterality: N/A;    CORONARY ARTERY BYPASS GRAFT  1987    EPIDURAL STEROID INJECTION N/A 2019    Procedure: Lumbar L5/S1 IL XUAN;  Surgeon: Tacho Trivedi MD;  Location: [facility];  Service: Pain Management;  Laterality: N/A;    EPIDURAL STEROID INJECTION N/A 2020    Procedure: Lumbar L5/S1 IL XUAN;  Surgeon: Tacho Trivedi MD;  Location: [facility];  Service: Pain Management;  Laterality: N/A;    EPIDURAL STEROID INJECTION N/A 2/10/2020    Procedure: Caudal XUAN;  Surgeon: Tacho Trivedi MD;  Location: [facility];  Service: Pain Management;  Laterality: N/A;    ESOPHAGOGASTRODUODENOSCOPY N/A 2020    Procedure: EGD (ESOPHAGOGASTRODUODENOSCOPY);  Surgeon: Javier Farmer MD;  Location: [facility] ENDO;  Service: Endoscopy;  Laterality: N/A;    INJECTION OF ANESTHETIC AGENT AROUND MEDIAL BRANCH NERVES INNERVATING LUMBAR FACET JOINT Bilateral 10/12/2020    Procedure: Bilateral L3-5 MBB;  Surgeon: Tacho Trivedi MD;  Location: [facility];  Service: Pain Management;  Laterality: Bilateral;    PROSTATE SURGERY      prostate radiation    SPINE SURGERY      fusion    TONSILLECTOMY           Family History:  Family History   Problem Relation Age of Onset    Heart attack Mother     Diabetes Mother     Heart disease Mother     Cataracts Mother     Stroke Father     Heart disease Father     Heart disease Brother        Social History:   Social History     Tobacco Use    Smoking status: Former Smoker     Packs/day: 1.50     Years: 20.00     Pack years: 30.00     Types: Cigarettes     Start date:      Quit date:      Years since quittin.8     Smokeless tobacco: Never Used   Substance and Sexual Activity    Alcohol use: No     Frequency: Never     Binge frequency: Never    Drug use: No    Sexual activity: Not Currently        Review of Systems     Review of Systems   Constitutional: Positive for fatigue. Negative for chills and fever.   HENT: Negative for congestion and sore throat.    Eyes:        (+) mild blurred vision   Respiratory: Negative for shortness of breath.    Cardiovascular: Negative for chest pain.   Gastrointestinal: Negative for anal bleeding, blood in stool, nausea and vomiting.   Genitourinary: Negative for dysuria and hematuria.   Musculoskeletal: Negative for back pain.   Skin: Negative for rash.   Neurological: Positive for dizziness and weakness (generalized). Negative for light-headedness and headaches.   Hematological: Does not bruise/bleed easily.   All other systems reviewed and are negative.       Physical Exam     Initial Vitals [10/23/20 1227]   BP Pulse Resp Temp SpO2   (!) 101/56 63 20 98 °F (36.7 °C) 99 %      MAP       --          Physical Exam  Nursing Notes and Vital Signs Reviewed.  Constitutional: Well-developed and well-nourished.   Head: Atraumatic. Normocephalic.  Eyes: EOM intact. No scleral icterus.  ENT: Mucous membranes are moist. Oropharynx is clear and symmetric.    Neck: Supple. Full ROM. No lymphadenopathy.  Cardiovascular: Regular rate. Regular rhythm. No murmurs, rubs, or gallops. Distal pulses are 2+ and symmetric.  Pulmonary/Chest: No respiratory distress. Clear to auscultation bilaterally. No wheezing or rales.  Abdominal: Soft and non-distended.  There is no tenderness.  No rebound, guarding, or rigidity.  Genitourinary: No CVA tenderness  Musculoskeletal: Moves all extremities. No obvious deformities. No calf tenderness.  Skin: Pale appearing.  Neurological:  Alert, awake, and appropriate.  Normal speech.  No acute focal neurological deficits are appreciated.  Psychiatric: Normal affect. Good eye  contact. Appropriate in content.     ED Course   Critical Care    Date/Time: 10/23/2020 3:42 PM  Performed by: Diamond Mann MD  Authorized by: Diamond Mann MD   Direct patient critical care time: 11 minutes  Additional history critical care time: 6 minutes  Ordering / reviewing critical care time: 12 minutes  Documentation critical care time: 7 minutes  Total critical care time (exclusive of procedural time) : 36 minutes  Critical care time was exclusive of separately billable procedures and treating other patients and teaching time.  Critical care was necessary to treat or prevent imminent or life-threatening deterioration of the following conditions: acute on chronic anemia requiring blood transfusion.  Critical care was time spent personally by me on the following activities: blood draw for specimens, development of treatment plan with patient or surrogate, interpretation of cardiac output measurements, evaluation of patient's response to treatment, examination of patient, obtaining history from patient or surrogate, ordering and performing treatments and interventions, ordering and review of laboratory studies, ordering and review of radiographic studies, pulse oximetry, re-evaluation of patient's condition, review of old charts and vascular access procedures.        ED Vital Signs:  Vitals:    10/23/20 1227 10/23/20 1250 10/23/20 1300 10/23/20 1330   BP: (!) 101/56  (!) 106/57 108/60   Pulse: 63  63 60   Resp: 20  (!) 22 20   Temp: 98 °F (36.7 °C)      TempSrc: Oral      SpO2: 99%  100% 100%   Weight:  96.5 kg (212 lb 11.9 oz)      10/23/20 1400 10/23/20 1413 10/23/20 1414 10/23/20 1415   BP: (!) 121/57 115/63 (!) 95/50 (!) 98/57   Pulse: 61 62 81 83   Resp: 18 (!) 22 (!) 22 (!) 22   Temp:       TempSrc:       SpO2: 100% 100% 100% 100%   Weight:        10/23/20 1430 10/23/20 1550 10/23/20 1605 10/23/20 1620   BP: 129/60 (!) 105/54 (!) 102/59 (!) 127/57   Pulse: 67 60 60 63   Resp: (!) 22 17 18  20   Temp:  98 °F (36.7 °C) 97.9 °F (36.6 °C) 97.9 °F (36.6 °C)   TempSrc:  Oral Oral Oral   SpO2: 100% 100% 100% 100%   Weight:        10/23/20 1705   BP: (!) 105/51   Pulse: 60   Resp: 20   Temp: 98 °F (36.7 °C)   TempSrc: Oral   SpO2: 100%   Weight:        Abnormal Lab Results:  Labs Reviewed   CBC W/ AUTO DIFFERENTIAL - Abnormal; Notable for the following components:       Result Value    RBC 2.66 (*)     Hemoglobin 7.0 (*)     Hematocrit 23.6 (*)     Mean Corpuscular Hemoglobin 26.3 (*)     Mean Corpuscular Hemoglobin Conc 29.7 (*)     RDW 15.5 (*)     Lymph # 0.9 (*)     Gran% 76.4 (*)     Lymph% 12.5 (*)     All other components within normal limits   COMPREHENSIVE METABOLIC PANEL - Abnormal; Notable for the following components:    Glucose 116 (*)     BUN, Bld 45 (*)     Creatinine 2.2 (*)     ALT 9 (*)     eGFR if  32 (*)     eGFR if non  28 (*)     All other components within normal limits   PROTIME-INR - Abnormal; Notable for the following components:    Prothrombin Time 12.8 (*)     All other components within normal limits   TROPONIN I   APTT   TYPE & SCREEN   PREPARE RBC SOFT        All Lab Results:  Results for orders placed or performed during the hospital encounter of 10/23/20   CBC auto differential   Result Value Ref Range    WBC 7.46 3.90 - 12.70 K/uL    RBC 2.66 (L) 4.60 - 6.20 M/uL    Hemoglobin 7.0 (L) 14.0 - 18.0 g/dL    Hematocrit 23.6 (L) 40.0 - 54.0 %    Mean Corpuscular Volume 89 82 - 98 fL    Mean Corpuscular Hemoglobin 26.3 (L) 27.0 - 31.0 pg    Mean Corpuscular Hemoglobin Conc 29.7 (L) 32.0 - 36.0 g/dL    RDW 15.5 (H) 11.5 - 14.5 %    Platelets 333 150 - 350 K/uL    MPV 9.7 9.2 - 12.9 fL    Immature Granulocytes 0.5 0.0 - 0.5 %    Gran # (ANC) 5.7 1.8 - 7.7 K/uL    Immature Grans (Abs) 0.04 0.00 - 0.04 K/uL    Lymph # 0.9 (L) 1.0 - 4.8 K/uL    Mono # 0.7 0.3 - 1.0 K/uL    Eos # 0.1 0.0 - 0.5 K/uL    Baso # 0.05 0.00 - 0.20 K/uL    nRBC 0 0 /100 WBC     Gran% 76.4 (H) 38.0 - 73.0 %    Lymph% 12.5 (L) 18.0 - 48.0 %    Mono% 9.2 4.0 - 15.0 %    Eosinophil% 0.7 0.0 - 8.0 %    Basophil% 0.7 0.0 - 1.9 %    Differential Method Automated    Comprehensive metabolic panel   Result Value Ref Range    Sodium 140 136 - 145 mmol/L    Potassium 3.9 3.5 - 5.1 mmol/L    Chloride 101 95 - 110 mmol/L    CO2 25 23 - 29 mmol/L    Glucose 116 (H) 70 - 110 mg/dL    BUN, Bld 45 (H) 8 - 23 mg/dL    Creatinine 2.2 (H) 0.5 - 1.4 mg/dL    Calcium 8.9 8.7 - 10.5 mg/dL    Total Protein 7.6 6.0 - 8.4 g/dL    Albumin 4.1 3.5 - 5.2 g/dL    Total Bilirubin 0.4 0.1 - 1.0 mg/dL    Alkaline Phosphatase 66 55 - 135 U/L    AST 15 10 - 40 U/L    ALT 9 (L) 10 - 44 U/L    Anion Gap 14 8 - 16 mmol/L    eGFR if African American 32 (A) >60 mL/min/1.73 m^2    eGFR if non African American 28 (A) >60 mL/min/1.73 m^2   Troponin I #1   Result Value Ref Range    Troponin I 0.016 0.000 - 0.026 ng/mL   Protime-INR   Result Value Ref Range    Prothrombin Time 12.8 (H) 9.0 - 12.5 sec    INR 1.2 0.8 - 1.2   APTT   Result Value Ref Range    aPTT 30.7 21.0 - 32.0 sec   Type & Screen   Result Value Ref Range    Group & Rh O POS     Indirect Keshav NEG    Prepare RBC 1 Unit   Result Value Ref Range    UNIT NUMBER [id number]     Product Code [id number]     DISPENSE STATUS ISSUED     CODING SYSTEM [id number]     Unit Blood Type Code 5100     Unit Blood Type O POS     Unit Expiration [id number]          The EKG was ordered, reviewed, and independently interpreted by the ED provider.  Interpretation time: 13:06  Rate: 62 BPM  Rhythm: Electronic ventricular pacemaker  Interpretation: No acute ST changes. No STEMI.      The Emergency Provider reviewed the vital signs and test results, which are outlined above.     ED Discussion       3:41 PM: Reassessed pt at this time, patient with chronic anemia, slightly worse than baseline, no hx to suggest active bleeding, recent endoscopy no etiology to suggest bleeding, still on blood  thinners, explained risk benefits, will get one unit of prbcs, advised further follow up with hematology may need occsional outpatient transfusions.  Pt states his condition has improved at this time. Discussed with pt all pertinent ED information and results. Discussed pt dx and plan of tx. Gave pt all f/u and return to the ED instructions. All questions and concerns were addressed at this time. Pt expresses understanding of information and instructions, and is comfortable with plan to discharge. Pt is stable for discharge.    I discussed with patient and/or family/caretaker that evaluation in the ED does not suggest any emergent or life threatening medical conditions requiring immediate intervention beyond what was provided in the ED, and I believe patient is safe for discharge.  Regardless, an unremarkable evaluation in the ED does not preclude the development or presence of a serious of life threatening condition. As such, patient was instructed to return immediately for any worsening or change in current symptoms.       MDM        Medical Decision Making:   Clinical Tests:   Lab Tests: Ordered and Reviewed  Medical Tests: Ordered and Reviewed           ED Medication(s):  Medications   0.9%  NaCl infusion (for blood administration) (has no administration in time range)       New Prescriptions    No medications on file       Follow-up Information     [facility]. Schedule an appointment as soon as possible for a visit in 2 days.    Specialty: Hematology and Oncology  Why: Return to  Emergency Room, If symptoms worsen  Contact information:  [address] [facility] [address] [phone]                     Scribe Attestation:   Scribe #1: I performed the above scribed service and the documentation accurately describes the services I performed. I attest to the accuracy of the note.     Attending:   Physician Attestation Statement for Scribe #1: I, Diamond Mann MD,  personally performed the services described in this documentation, as scribed by Eliana Baarkat, in my presence, and it is both accurate and complete.           Clinical Impression       ICD-10-CM ICD-9-CM   1. Acute on chronic anemia  D64.9 285.9   2. Dizziness  R42 780.4   3. Chronic anticoagulation  Z79.01 V58.61   4. Symptomatic anemia  D64.9 285.9       Disposition:   Disposition: Discharged  Condition: Stable         Diamond Mann MD  10/23/20 6804

## 2020-10-25 ENCOUNTER — NURSE TRIAGE (OUTPATIENT)
Dept: ADMINISTRATIVE | Facility: CLINIC | Age: 80
End: 2020-10-25

## 2020-10-25 NOTE — TELEPHONE ENCOUNTER
Post procedure follow up call scheduled for today. No contact attempted, patient was just discharged from hospital last night. No need for further triage at this time.     Reason for Disposition   Caller has already spoken with the PCP and has no further questions.    Additional Information   Negative: Caller is angry or rude (e.g., hangs up, verbally abusive, yelling)   Negative: Caller hangs up    Protocols used: NO CONTACT OR DUPLICATE CONTACT CALL-A-AH

## 2020-11-01 ENCOUNTER — HOSPITAL ENCOUNTER (EMERGENCY)
Facility: HOSPITAL | Age: 80
Discharge: HOME | End: 2020-11-01
Attending: EMERGENCY MEDICINE
Payer: MEDICARE

## 2020-11-01 VITALS
TEMPERATURE: 98 F | HEIGHT: 69 IN | RESPIRATION RATE: 20 BRPM | WEIGHT: 213.19 LBS | DIASTOLIC BLOOD PRESSURE: 60 MMHG | HEART RATE: 61 BPM | OXYGEN SATURATION: 100 % | SYSTOLIC BLOOD PRESSURE: 128 MMHG | BODY MASS INDEX: 31.58 KG/M2

## 2020-11-01 DIAGNOSIS — D64.9 SYMPTOMATIC ANEMIA: Primary | ICD-10-CM

## 2020-11-01 LAB
ABO + RH BLD: NORMAL
ALBUMIN SERPL BCP-MCNC: 4.1 G/DL (ref 3.5–5.2)
ALP SERPL-CCNC: 68 U/L (ref 55–135)
ALT SERPL W/O P-5'-P-CCNC: 13 U/L (ref 10–44)
ANION GAP SERPL CALC-SCNC: 15 MMOL/L (ref 8–16)
APTT BLDCRRT: 29.3 SEC (ref 21–32)
AST SERPL-CCNC: 23 U/L (ref 10–40)
BASOPHILS # BLD AUTO: 0.04 K/UL (ref 0–0.2)
BASOPHILS NFR BLD: 0.6 % (ref 0–1.9)
BILIRUB SERPL-MCNC: 0.4 MG/DL (ref 0.1–1)
BILIRUB UR QL STRIP: NEGATIVE
BLD GP AB SCN CELLS X3 SERPL QL: NORMAL
BLD PROD TYP BPU: NORMAL
BLOOD UNIT EXPIRATION DATE: NORMAL
BLOOD UNIT TYPE CODE: 9500
BLOOD UNIT TYPE: NORMAL
BUN SERPL-MCNC: 23 MG/DL (ref 8–23)
CALCIUM SERPL-MCNC: 9.2 MG/DL (ref 8.7–10.5)
CHLORIDE SERPL-SCNC: 103 MMOL/L (ref 95–110)
CLARITY UR: CLEAR
CO2 SERPL-SCNC: 23 MMOL/L (ref 23–29)
CODING SYSTEM: NORMAL
COLOR UR: YELLOW
CREAT SERPL-MCNC: 2 MG/DL (ref 0.5–1.4)
DIFFERENTIAL METHOD: ABNORMAL
DISPENSE STATUS: NORMAL
EOSINOPHIL # BLD AUTO: 0.1 K/UL (ref 0–0.5)
EOSINOPHIL NFR BLD: 1.3 % (ref 0–8)
ERYTHROCYTE [DISTWIDTH] IN BLOOD BY AUTOMATED COUNT: 15.5 % (ref 11.5–14.5)
EST. GFR  (AFRICAN AMERICAN): 36 ML/MIN/1.73 M^2
EST. GFR  (NON AFRICAN AMERICAN): 31 ML/MIN/1.73 M^2
GLUCOSE SERPL-MCNC: 151 MG/DL (ref 70–110)
GLUCOSE UR QL STRIP: NEGATIVE
HCT VFR BLD AUTO: 25.8 % (ref 40–54)
HGB BLD-MCNC: 7.6 G/DL (ref 14–18)
HGB UR QL STRIP: NEGATIVE
IMM GRANULOCYTES # BLD AUTO: 0.03 K/UL (ref 0–0.04)
IMM GRANULOCYTES NFR BLD AUTO: 0.4 % (ref 0–0.5)
INR PPP: 1.2 (ref 0.8–1.2)
KETONES UR QL STRIP: ABNORMAL
LEUKOCYTE ESTERASE UR QL STRIP: NEGATIVE
LYMPHOCYTES # BLD AUTO: 0.8 K/UL (ref 1–4.8)
LYMPHOCYTES NFR BLD: 11.1 % (ref 18–48)
MCH RBC QN AUTO: 25.1 PG (ref 27–31)
MCHC RBC AUTO-ENTMCNC: 29.5 G/DL (ref 32–36)
MCV RBC AUTO: 85 FL (ref 82–98)
MONOCYTES # BLD AUTO: 0.6 K/UL (ref 0.3–1)
MONOCYTES NFR BLD: 8.9 % (ref 4–15)
NEUTROPHILS # BLD AUTO: 5.2 K/UL (ref 1.8–7.7)
NEUTROPHILS NFR BLD: 77.7 % (ref 38–73)
NITRITE UR QL STRIP: NEGATIVE
NRBC BLD-RTO: 0 /100 WBC
NUM UNITS TRANS PACKED RBC: NORMAL
PH UR STRIP: 6 [PH] (ref 5–8)
PLATELET # BLD AUTO: 401 K/UL (ref 150–350)
PMV BLD AUTO: 9.7 FL (ref 9.2–12.9)
POTASSIUM SERPL-SCNC: 3.9 MMOL/L (ref 3.5–5.1)
PROT SERPL-MCNC: 7.7 G/DL (ref 6–8.4)
PROT UR QL STRIP: NEGATIVE
PROTHROMBIN TIME: 12.3 SEC (ref 9–12.5)
RBC # BLD AUTO: 3.03 M/UL (ref 4.6–6.2)
SARS-COV-2 RDRP RESP QL NAA+PROBE: NEGATIVE
SODIUM SERPL-SCNC: 141 MMOL/L (ref 136–145)
SP GR UR STRIP: 1.02 (ref 1–1.03)
URN SPEC COLLECT METH UR: ABNORMAL
UROBILINOGEN UR STRIP-ACNC: NEGATIVE EU/DL
WBC # BLD AUTO: 6.73 K/UL (ref 3.9–12.7)

## 2020-11-01 PROCEDURE — 85025 COMPLETE CBC W/AUTO DIFF WBC: CPT | Mod: HCNC

## 2020-11-01 PROCEDURE — 99291 CRITICAL CARE FIRST HOUR: CPT | Mod: 25,HCNC

## 2020-11-01 PROCEDURE — 36415 COLL VENOUS BLD VENIPUNCTURE: CPT | Mod: HCNC

## 2020-11-01 PROCEDURE — 36430 TRANSFUSION BLD/BLD COMPNT: CPT | Mod: HCNC

## 2020-11-01 PROCEDURE — 86920 COMPATIBILITY TEST SPIN: CPT | Mod: HCNC

## 2020-11-01 PROCEDURE — 85610 PROTHROMBIN TIME: CPT | Mod: HCNC

## 2020-11-01 PROCEDURE — 86850 RBC ANTIBODY SCREEN: CPT | Mod: HCNC

## 2020-11-01 PROCEDURE — 85730 THROMBOPLASTIN TIME PARTIAL: CPT | Mod: HCNC

## 2020-11-01 PROCEDURE — 80053 COMPREHEN METABOLIC PANEL: CPT | Mod: HCNC

## 2020-11-01 PROCEDURE — 81003 URINALYSIS AUTO W/O SCOPE: CPT | Mod: HCNC

## 2020-11-01 PROCEDURE — U0002 COVID-19 LAB TEST NON-CDC: HCPCS | Mod: HCNC

## 2020-11-01 PROCEDURE — P9016 RBC LEUKOCYTES REDUCED: HCPCS | Mod: HCNC

## 2020-11-01 RX ORDER — HYDROCODONE BITARTRATE AND ACETAMINOPHEN 500; 5 MG/1; MG/1
TABLET ORAL
Status: DISCONTINUED | OUTPATIENT
Start: 2020-11-01 | End: 2020-11-01 | Stop reason: HOSPADM

## 2020-11-01 RX ORDER — CLOPIDOGREL BISULFATE 75 MG/1
75 TABLET ORAL DAILY
COMMUNITY
End: 2020-11-03 | Stop reason: SDUPTHER

## 2020-11-01 RX ORDER — SPIRONOLACTONE 50 MG/1
25 TABLET, FILM COATED ORAL DAILY
COMMUNITY
End: 2021-10-28 | Stop reason: SDUPTHER

## 2020-11-01 RX ORDER — FUROSEMIDE 40 MG/1
40 TABLET ORAL 2 TIMES DAILY
COMMUNITY
End: 2020-11-11 | Stop reason: SDUPTHER

## 2020-11-01 NOTE — ED PROVIDER NOTES
SCRIBE #1 NOTE: ICatarino, alba scribing for, and in the presence of, Jovanni Lacey MD. I have scribed the entire note.      History      Chief Complaint   Patient presents with    Fatigue     Pt c/o of weakness, fatigue, and hypotension with recent low H&H.        Review of patient's allergies indicates:  No Known Allergies     HPI   HPI    11/1/2020, 11:20 AM   History obtained from the patient      History of Present Illness: Jake Ortiz is a 80 y.o. male patient who presents to the Emergency Department for generalized weakness, onset several days PTA. Pt states that he was hypotensive this morning with a systolic BP below 100. Symptoms are constant and moderate in severity. No mitigating or exacerbating factors reported. Associated sxs include fatigue. Patient denies any fever, chills, n/v/d, SOB, CP, numbness, dizziness, headache, and all other sxs at this time. Pt states that he had a blood transfusion last week. No further complaints or concerns at this time.     Arrival mode: Personal vehicle    PCP: Chanda Brush MD       Past Medical History:  Past Medical History:   Diagnosis Date    Abnormal PFT     Anemia     Arthritis     Atrial fibrillation 10/19/2017    Back pain     Congestive heart failure 3/5/2018    Coronary artery disease     Diabetes mellitus 01/2018     am 02/27/2018    DM (diabetes mellitus) 2018     am 06/23/2020    Hyperlipemia     Hypertension     Myocardial infarction     Obesity     NASREEN (obstructive sleep apnea) 6/5/2018    Prostate cancer 2015    Tobacco dependence     Type 2 diabetes mellitus        Past Surgical History:  Past Surgical History:   Procedure Laterality Date    COLONOSCOPY N/A 9/22/2020    Procedure: COLONOSCOPY;  Surgeon: Javier Farmer MD;  Location: [facility];  Service: Endoscopy;  Laterality: N/A;    CORONARY ARTERY BYPASS GRAFT  1987    EPIDURAL STEROID INJECTION N/A 11/22/2019    Procedure: Lumbar L5/S1  IL XUAN;  Surgeon: Tacho Trivedi MD;  Location: [facility];  Service: Pain Management;  Laterality: N/A;    EPIDURAL STEROID INJECTION N/A 2020    Procedure: Lumbar L5/S1 IL XUAN;  Surgeon: Tacho Trivedi MD;  Location: [facility];  Service: Pain Management;  Laterality: N/A;    EPIDURAL STEROID INJECTION N/A 2/10/2020    Procedure: Caudal XUAN;  Surgeon: Tacho Trivedi MD;  Location: [facility];  Service: Pain Management;  Laterality: N/A;    ESOPHAGOGASTRODUODENOSCOPY N/A 2020    Procedure: EGD (ESOPHAGOGASTRODUODENOSCOPY);  Surgeon: Javier Farmer MD;  Location: [facility];  Service: Endoscopy;  Laterality: N/A;    INJECTION OF ANESTHETIC AGENT AROUND MEDIAL BRANCH NERVES INNERVATING LUMBAR FACET JOINT Bilateral 10/12/2020    Procedure: Bilateral L3-5 MBB;  Surgeon: Tacho Trivedi MD;  Location: [facility];  Service: Pain Management;  Laterality: Bilateral;    PROSTATE SURGERY      prostate radiation    SPINE SURGERY      fusion    TONSILLECTOMY           Family History:  Family History   Problem Relation Age of Onset    Heart attack Mother     Diabetes Mother     Heart disease Mother     Cataracts Mother     Stroke Father     Heart disease Father     Heart disease Brother        Social History:  Social History     Tobacco Use    Smoking status: Former Smoker     Packs/day: 1.50     Years: 20.00     Pack years: 30.00     Types: Cigarettes     Start date:      Quit date:      Years since quittin.8    Smokeless tobacco: Never Used   Substance and Sexual Activity    Alcohol use: No     Frequency: Never     Binge frequency: Never    Drug use: No    Sexual activity: Not Currently       ROS   Review of Systems   Constitutional: Positive for fatigue. Negative for chills and fever.   HENT: Negative for sore throat.    Respiratory: Negative for shortness of breath.    Cardiovascular: Negative for chest pain.   Gastrointestinal: Negative for diarrhea,  nausea and vomiting.   Genitourinary: Negative for dysuria.   Musculoskeletal: Negative for back pain.   Skin: Negative for rash.   Neurological: Positive for weakness (generalized). Negative for dizziness, seizures, light-headedness, numbness and headaches.   Hematological: Does not bruise/bleed easily.   All other systems reviewed and are negative.    Physical Exam      Initial Vitals [11/01/20 1121]   BP Pulse Resp Temp SpO2   (!) 140/65 86 20 98.1 °F (36.7 °C) 100 %      MAP       --          Physical Exam  Nursing Notes and Vital Signs Reviewed.  Constitutional: Patient is in no acute distress. Well-developed and well-nourished.  Head: Atraumatic. Normocephalic.  Eyes: PERRL. EOM intact. Conjunctivae are not pale. No scleral icterus.  ENT: Mucous membranes are moist. Oropharynx is clear and symmetric.    Neck: Supple. Full ROM. No lymphadenopathy.  Cardiovascular: Regular rate. Regular rhythm. No murmurs, rubs, or gallops. Distal pulses are 2+ and symmetric.  Pulmonary/Chest: No respiratory distress. Clear to auscultation bilaterally. No wheezing or rales.  Abdominal: Soft and non-distended.  There is no tenderness.  No rebound, guarding, or rigidity.   Musculoskeletal: Moves all extremities. No obvious deformities. No edema.  Skin: Warm and dry.  Neurological:  Alert, awake, and appropriate.  Normal speech.  No acute focal neurological deficits are appreciated.  Psychiatric: Normal affect. Good eye contact. Appropriate in content.    ED Course    Critical Care    Date/Time: 11/1/2020 1:05 PM  Performed by: Jovanni Lacey MD  Authorized by: Jovanni Lacey MD   Direct patient critical care time: 20 minutes  Additional history critical care time: 5 minutes  Ordering / reviewing critical care time: 10 minutes  Documentation critical care time: 10 minutes  Total critical care time (exclusive of procedural time) : 45 minutes  Critical care time was exclusive of separately billable procedures and treating  "other patients and teaching time.  Critical care was necessary to treat or prevent imminent or life-threatening deterioration of the following conditions: Symptomatic anemia.  Critical care was time spent personally by me on the following activities: blood draw for specimens, development of treatment plan with patient or surrogate, interpretation of cardiac output measurements, evaluation of patient's response to treatment, examination of patient, obtaining history from patient or surrogate, ordering and performing treatments and interventions, ordering and review of laboratory studies, pulse oximetry, re-evaluation of patient's condition and review of old charts.        ED Vital Signs:  Vitals:    11/01/20 1121 11/01/20 1124 11/01/20 1127 11/01/20 1128   BP: (!) 140/65 (!) 140/65 128/61 128/61   Pulse: 86 85 77 77   Resp: 20      Temp: 98.1 °F (36.7 °C)      TempSrc: Oral      SpO2: 100% 100%  100%   Weight: 96.7 kg (213 lb 3 oz)      Height: 5' 9" (1.753 m)       11/01/20 1132 11/01/20 1147 11/01/20 1202 11/01/20 1217   BP: (!) 116/56 (!) 111/56 (!) 107/52 (!) 119/58   Pulse: 69 60 61 72   Resp:       Temp:       TempSrc:       SpO2: 100% 99% 98% 100%   Weight:       Height:        11/01/20 1232 11/01/20 1247 11/01/20 1302   BP: (!) 110/56 (!) 113/56 128/65   Pulse: 60 60 63   Resp:      Temp:      TempSrc:      SpO2: 100% 100% 100%   Weight:      Height:          Abnormal Lab Results:  Labs Reviewed   CBC W/ AUTO DIFFERENTIAL - Abnormal; Notable for the following components:       Result Value    RBC 3.03 (*)     Hemoglobin 7.6 (*)     Hematocrit 25.8 (*)     MCH 25.1 (*)     MCHC 29.5 (*)     RDW 15.5 (*)     Platelets 401 (*)     Lymph # 0.8 (*)     Gran % 77.7 (*)     Lymph % 11.1 (*)     All other components within normal limits   COMPREHENSIVE METABOLIC PANEL - Abnormal; Notable for the following components:    Glucose 151 (*)     Creatinine 2.0 (*)     eGFR if  36 (*)     eGFR if non  " American 31 (*)     All other components within normal limits   URINALYSIS, REFLEX TO URINE CULTURE - Abnormal; Notable for the following components:    Ketones, UA Trace (*)     All other components within normal limits    Narrative:     Specimen Source->Urine   SARS-COV-2 RNA AMPLIFICATION, QUAL   APTT   PROTIME-INR   TYPE & SCREEN   PREPARE RBC SOFT        All Lab Results:  Results for orders placed or performed during the hospital encounter of 11/01/20   CBC Auto Differential   Result Value Ref Range    WBC 6.73 3.90 - 12.70 K/uL    RBC 3.03 (L) 4.60 - 6.20 M/uL    Hemoglobin 7.6 (L) 14.0 - 18.0 g/dL    Hematocrit 25.8 (L) 40.0 - 54.0 %    MCV 85 82 - 98 fL    MCH 25.1 (L) 27.0 - 31.0 pg    MCHC 29.5 (L) 32.0 - 36.0 g/dL    RDW 15.5 (H) 11.5 - 14.5 %    Platelets 401 (H) 150 - 350 K/uL    MPV 9.7 9.2 - 12.9 fL    Immature Granulocytes 0.4 0.0 - 0.5 %    Gran # (ANC) 5.2 1.8 - 7.7 K/uL    Immature Grans (Abs) 0.03 0.00 - 0.04 K/uL    Lymph # 0.8 (L) 1.0 - 4.8 K/uL    Mono # 0.6 0.3 - 1.0 K/uL    Eos # 0.1 0.0 - 0.5 K/uL    Baso # 0.04 0.00 - 0.20 K/uL    nRBC 0 0 /100 WBC    Gran % 77.7 (H) 38.0 - 73.0 %    Lymph % 11.1 (L) 18.0 - 48.0 %    Mono % 8.9 4.0 - 15.0 %    Eosinophil % 1.3 0.0 - 8.0 %    Basophil % 0.6 0.0 - 1.9 %    Differential Method Automated    Comprehensive Metabolic Panel   Result Value Ref Range    Sodium 141 136 - 145 mmol/L    Potassium 3.9 3.5 - 5.1 mmol/L    Chloride 103 95 - 110 mmol/L    CO2 23 23 - 29 mmol/L    Glucose 151 (H) 70 - 110 mg/dL    BUN 23 8 - 23 mg/dL    Creatinine 2.0 (H) 0.5 - 1.4 mg/dL    Calcium 9.2 8.7 - 10.5 mg/dL    Total Protein 7.7 6.0 - 8.4 g/dL    Albumin 4.1 3.5 - 5.2 g/dL    Total Bilirubin 0.4 0.1 - 1.0 mg/dL    Alkaline Phosphatase 68 55 - 135 U/L    AST 23 10 - 40 U/L    ALT 13 10 - 44 U/L    Anion Gap 15 8 - 16 mmol/L    eGFR if African American 36 (A) >60 mL/min/1.73 m^2    eGFR if non African American 31 (A) >60 mL/min/1.73 m^2   Urinalysis, Reflex to  Urine Culture Urine, Clean Catch    Specimen: Urine   Result Value Ref Range    Specimen UA Urine, Clean Catch     Color, UA Yellow Yellow, Straw, Beth    Appearance, UA Clear Clear    pH, UA 6.0 5.0 - 8.0    Specific Gravity, UA 1.025 1.005 - 1.030    Protein, UA Negative Negative    Glucose, UA Negative Negative    Ketones, UA Trace (A) Negative    Bilirubin (UA) Negative Negative    Occult Blood UA Negative Negative    Nitrite, UA Negative Negative    Urobilinogen, UA Negative <2.0 EU/dL    Leukocytes, UA Negative Negative   COVID-19 Rapid Screening   Result Value Ref Range    SARS-CoV-2 RNA, Amplification, Qual Negative Negative   APTT   Result Value Ref Range    aPTT 29.3 21.0 - 32.0 sec   Protime-INR   Result Value Ref Range    Prothrombin Time 12.3 9.0 - 12.5 sec    INR 1.2 0.8 - 1.2     Imaging Results:  Imaging Results    None                 The Emergency Provider reviewed the vital signs and test results, which are outlined above.    ED Discussion     12:59 PM: Re-evaluated pt. Pt is resting comfortably and is in no acute distress. D/w pt all pertinent results and need for blood transfusion. D/w pt any concerns expressed at this time. Answered all questions. Pt expresses understanding at this time and consents to blood transfusion. Pt states that he would like to be discharged after the transfusion, and that he has follow up appointments with Cardiology and Hem/Onc later this week.    1:01 PM: Reassessed pt at this time. Discussed with pt all pertinent ED information and results. Discussed pt dx and plan of tx. Gave pt all f/u and return to the ED instructions. All questions and concerns were addressed at this time. Pt expresses understanding of information and instructions, and is comfortable with plan to discharge. Pt is stable for discharge following blood transfusion.    I discussed with patient and/or family/caretaker that evaluation in the ED does not suggest any emergent or life threatening  medical conditions requiring immediate intervention beyond what was provided in the ED, and I believe patient is safe for discharge.  Regardless, an unremarkable evaluation in the ED does not preclude the development or presence of a serious of life threatening condition. As such, patient was instructed to return immediately for any worsening or change in current symptoms.         ED Medication(s):  Medications   0.9%  NaCl infusion (for blood administration) (has no administration in time range)       Follow-up Information     Cardiology and Hem/onc In 2 days.                New Prescriptions    No medications on file         Medical Decision Making    Medical Decision Making:   Clinical Tests:   Lab Tests: Ordered and Reviewed           Scribe Attestation:   Scribe #1: I performed the above scribed service and the documentation accurately describes the services I performed. I attest to the accuracy of the note.    Attending:   Physician Attestation Statement for Scribe #1: I, Jovanni Lacey MD, personally performed the services described in this documentation, as scribed by Catarino Campa, in my presence, and it is both accurate and complete.          Clinical Impression       ICD-10-CM ICD-9-CM   1. Symptomatic anemia  D64.9 285.9       Disposition:   Disposition: Discharged  Condition: Stable         Jovanni Lacey MD  11/01/20 1340       Jovanni Lacey MD  11/01/20 1340

## 2020-11-01 NOTE — ED NOTES
Pt sitting up in bed comfortably. AAO x 4. Skin warm and dry. Resp even and unlabored with equal chest rise and fall. Blood infusing to 20G left hand. Patient denies flank pain, chills, shortness of breath or itching. Family at bedside. Will continue to monitor.

## 2020-11-01 NOTE — ED NOTES
Pt lying in bed comfortably. AAO x 4. Skin warm and dry. Resp even and unlabored with equal chest rise and fall. Denies any needs or assist at this time.

## 2020-11-03 ENCOUNTER — OFFICE VISIT (OUTPATIENT)
Dept: CARDIOLOGY | Facility: CLINIC | Age: 80
End: 2020-11-03
Payer: MEDICARE

## 2020-11-03 VITALS
OXYGEN SATURATION: 98 % | HEIGHT: 69 IN | BODY MASS INDEX: 31.51 KG/M2 | SYSTOLIC BLOOD PRESSURE: 130 MMHG | WEIGHT: 212.75 LBS | DIASTOLIC BLOOD PRESSURE: 62 MMHG | RESPIRATION RATE: 16 BRPM | HEART RATE: 77 BPM

## 2020-11-03 DIAGNOSIS — I50.42 CHRONIC COMBINED SYSTOLIC AND DIASTOLIC CONGESTIVE HEART FAILURE: ICD-10-CM

## 2020-11-03 DIAGNOSIS — I48.0 PAROXYSMAL ATRIAL FIBRILLATION: ICD-10-CM

## 2020-11-03 DIAGNOSIS — I48.0 PAF (PAROXYSMAL ATRIAL FIBRILLATION): ICD-10-CM

## 2020-11-03 DIAGNOSIS — E78.2 MIXED HYPERLIPIDEMIA: ICD-10-CM

## 2020-11-03 DIAGNOSIS — Z95.1 S/P CABG X 3: ICD-10-CM

## 2020-11-03 DIAGNOSIS — I10 ESSENTIAL HYPERTENSION: ICD-10-CM

## 2020-11-03 DIAGNOSIS — Z95.810 ICD (IMPLANTABLE CARDIOVERTER-DEFIBRILLATOR) IN PLACE: ICD-10-CM

## 2020-11-03 DIAGNOSIS — I25.118 CORONARY ARTERY DISEASE OF NATIVE ARTERY OF NATIVE HEART WITH STABLE ANGINA PECTORIS: Primary | ICD-10-CM

## 2020-11-03 PROCEDURE — 99999 PR PBB SHADOW E&M-EST. PATIENT-LVL IV: CPT | Mod: PBBFAC,HCNC,, | Performed by: INTERNAL MEDICINE

## 2020-11-03 PROCEDURE — 3078F DIAST BP <80 MM HG: CPT | Mod: HCNC,CPTII,S$GLB, | Performed by: INTERNAL MEDICINE

## 2020-11-03 PROCEDURE — 99999 PR PBB SHADOW E&M-EST. PATIENT-LVL IV: ICD-10-PCS | Mod: PBBFAC,HCNC,, | Performed by: INTERNAL MEDICINE

## 2020-11-03 PROCEDURE — 1159F PR MEDICATION LIST DOCUMENTED IN MEDICAL RECORD: ICD-10-PCS | Mod: HCNC,S$GLB,, | Performed by: INTERNAL MEDICINE

## 2020-11-03 PROCEDURE — 3078F PR MOST RECENT DIASTOLIC BLOOD PRESSURE < 80 MM HG: ICD-10-PCS | Mod: HCNC,CPTII,S$GLB, | Performed by: INTERNAL MEDICINE

## 2020-11-03 PROCEDURE — 1101F PR PT FALLS ASSESS DOC 0-1 FALLS W/OUT INJ PAST YR: ICD-10-PCS | Mod: HCNC,CPTII,S$GLB, | Performed by: INTERNAL MEDICINE

## 2020-11-03 PROCEDURE — 99214 PR OFFICE/OUTPT VISIT, EST, LEVL IV, 30-39 MIN: ICD-10-PCS | Mod: HCNC,S$GLB,, | Performed by: INTERNAL MEDICINE

## 2020-11-03 PROCEDURE — 3075F SYST BP GE 130 - 139MM HG: CPT | Mod: HCNC,CPTII,S$GLB, | Performed by: INTERNAL MEDICINE

## 2020-11-03 PROCEDURE — 99214 OFFICE O/P EST MOD 30 MIN: CPT | Mod: HCNC,S$GLB,, | Performed by: INTERNAL MEDICINE

## 2020-11-03 PROCEDURE — 1101F PT FALLS ASSESS-DOCD LE1/YR: CPT | Mod: HCNC,CPTII,S$GLB, | Performed by: INTERNAL MEDICINE

## 2020-11-03 PROCEDURE — 1159F MED LIST DOCD IN RCRD: CPT | Mod: HCNC,S$GLB,, | Performed by: INTERNAL MEDICINE

## 2020-11-03 PROCEDURE — 3075F PR MOST RECENT SYSTOLIC BLOOD PRESS GE 130-139MM HG: ICD-10-PCS | Mod: HCNC,CPTII,S$GLB, | Performed by: INTERNAL MEDICINE

## 2020-11-03 RX ORDER — LOSARTAN POTASSIUM 25 MG/1
25 TABLET ORAL DAILY
Qty: 90 TABLET | Refills: 1 | Status: SHIPPED | OUTPATIENT
Start: 2020-11-03 | End: 2020-11-16 | Stop reason: SDUPTHER

## 2020-11-03 RX ORDER — CLOPIDOGREL BISULFATE 75 MG/1
75 TABLET ORAL DAILY
Qty: 90 TABLET | Refills: 1 | Status: ON HOLD | OUTPATIENT
Start: 2020-11-03 | End: 2021-12-24 | Stop reason: HOSPADM

## 2020-11-03 RX ORDER — PANTOPRAZOLE SODIUM 40 MG/1
40 TABLET, DELAYED RELEASE ORAL DAILY
Qty: 90 TABLET | Refills: 1 | Status: SHIPPED | OUTPATIENT
Start: 2020-11-03 | End: 2021-02-19 | Stop reason: SDUPTHER

## 2020-11-03 NOTE — PROGRESS NOTES
Subjective:   Patient ID:  Jake Ortiz is a 80 y.o. male who presents for cardiac consult of Follow-up          Follow-up  Pertinent negatives include no chest pain.     The patient came in today for cardiac consult of Follow-up    Jake Ortiz is a 80 y.o. 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    4/21/20  Pt seen at [facility] last by CARMELINA Spencer 9/18/19. Overall feels great for the most part. He has been started to gain some water weight in the past, he had been on Entreso by Dr. Menendez, lost 173 lbs. He had paracentesis in past was told due to CHF. His weight started coming back up to 208 lbs and then was gaining weight and he has doubled his meds - Entresto. Otherwise feels well has good energy. He changed here due to insurance.   BP - 108/67, pulse 62, oxygen 98%, weight 209 lbs, blood sugar 127  Reviewed prior chart from [facility]    5/22/20  BNP was neg after last visit, Cr slightly increased with BUN elevated told to take lasix daily and PRN extra. He saw Dr. Holley yesterday, was given Flonase breathing improved.   /120s systolics, 67 diastolic, pulse 60s. . Overall doing will. Needs device clinic.     7/16/20  ECHO with EF 50%, mild to mod MR/TR. Pt had trouble getting Entresto filled due to being in donut hole but patient assistance form filled out.   He has significant back pain. He is euvolemic, no CP/SOB.   ECG - bi V paced    9/24/20 - hosp f/u  He was admitted for lower GI bleed and acute blood loss anemia. Initial H/H of 5.1/16.9.  Patient was transfused 2 units of PRBCs. GI consulted. Will hold anticoagulations. Today H/H 7.4/23.3. Case  discussed with Dr. Farmer, will need  EGD and colonoscopy to evaluate further and given recent anticoagulation to decrease the risk of bleeding from the procedures will plan for the  procedures in 5 days. As of 9/20 pt had a bowel movement with red blood yesterday morning, no further episodes reported. This morning H/H 6.7/21.5, will transfuse 2 units of PRBCs. Plans for EGD/colonoscopy on Tuesday. As of 9/21 no active bleeding noted. H/H 9.0/28.0. Continue to monitor. Plan for procedure tomorrow. 09/22: Patient had EGD that showed gastritis which was biopsied. Discussed with GI who recommends dc home today, restart anti coagulation tomorrow, and f/u OP for video capsule study. GI will arrange OP f/u and call patient. New Rx for Pantoprazole 40mg daily sent to pharmacy.   He has restarted taking Eliqis    11/3/20  He has been to ER few times with fatigue and symptomatic anemia, transfused 10/23/20 and then again last Sunday 11/2/20. He cannot get Video capsule study due to pacemaker. He will see Dr. Nath for further workup tx. He has severe back pain, had injection which improved sx will get RFA. Will change Entresto due to Losartan, has trouble affording some meds, ELiquis is also expensive but prefer over coumadin.     Patient feels no chest pain, no sob, no leg swelling, no PND, no palpitation, no dizziness, no syncope, no CNS symptoms.    Patient has fairly good exercise tolerance.    Patient is compliant with medications.    Results for orders placed during the hospital encounter of 06/01/20   Echo Color Flow Doppler? Yes    Narrative · Concentric left ventricular hypertrophy.  · Left ventricular systolic function. The estimated ejection fraction is   50%.  · Indeterminate left ventricular diastolic function.  · Severe left atrial enlargement.  · Mild-to-moderate mitral regurgitation.  · Mild to moderate tricuspid regurgitation.            Past Medical History:   Diagnosis Date    Abnormal PFT     Anemia     Arthritis     Atrial fibrillation 10/19/2017    Back pain     Congestive heart failure 3/5/2018    Coronary artery disease     Diabetes mellitus 01/2018     am  2018    DM (diabetes mellitus) 2018     am 2020    Hyperlipemia     Hypertension     Myocardial infarction     Obesity     NASREEN (obstructive sleep apnea) 2018    Prostate cancer 2015    Tobacco dependence     Type 2 diabetes mellitus        Past Surgical History:   Procedure Laterality Date    COLONOSCOPY N/A 2020    Procedure: COLONOSCOPY;  Surgeon: Javier Farmer MD;  Location: [facility] ENDO;  Service: Endoscopy;  Laterality: N/A;    CORONARY ARTERY BYPASS GRAFT  1987    EPIDURAL STEROID INJECTION N/A 2019    Procedure: Lumbar L5/S1 IL XUAN;  Surgeon: Tacho Trivedi MD;  Location: [facility];  Service: Pain Management;  Laterality: N/A;    EPIDURAL STEROID INJECTION N/A 2020    Procedure: Lumbar L5/S1 IL XUAN;  Surgeon: Tacho Trivedi MD;  Location: [facility];  Service: Pain Management;  Laterality: N/A;    EPIDURAL STEROID INJECTION N/A 2/10/2020    Procedure: Caudal XUAN;  Surgeon: Tacho Trivedi MD;  [facility];  Service: Pain Management;  Laterality: N/A;    ESOPHAGOGASTRODUODENOSCOPY N/A 2020    Procedure: EGD (ESOPHAGOGASTRODUODENOSCOPY);  Surgeon: Javier Farmer MD;  Location: [facility] ENDO;  Service: Endoscopy;  Laterality: N/A;    INJECTION OF ANESTHETIC AGENT AROUND MEDIAL BRANCH NERVES INNERVATING LUMBAR FACET JOINT Bilateral 10/12/2020    Procedure: Bilateral L3-5 MBB;  Surgeon: Tacho Trivedi MD;  Location: [facility] PAIN MGT;  Service: Pain Management;  Laterality: Bilateral;    PROSTATE SURGERY      prostate radiation    SPINE SURGERY      fusion    TONSILLECTOMY         Social History     Tobacco Use    Smoking status: Former Smoker     Packs/day: 1.50     Years: 20.00     Pack years: 30.00     Types: Cigarettes     Start date:      Quit date:      Years since quittin.8    Smokeless tobacco: Never Used   Substance Use Topics    Alcohol use: No     Frequency: Never     Binge frequency:  Never    Drug use: No       Family History   Problem Relation Age of Onset    Heart attack Mother     Diabetes Mother     Heart disease Mother     Cataracts Mother     Stroke Father     Heart disease Father     Heart disease Brother        Patient's Medications   New Prescriptions    LOSARTAN (COZAAR) 25 MG TABLET    Take 1 tablet (25 mg total) by mouth once daily.   Previous Medications    ACETAMINOPHEN (TYLENOL) 500 MG TABLET    Take 500 mg by mouth every 6 (six) hours as needed for Pain.    ATORVASTATIN (LIPITOR) 80 MG TABLET    TK 1 T PO D    BLOOD SUGAR DIAGNOSTIC STRP    Check blood glucose levels daily in the AM fasting and 1-2 times more daily.    BLOOD-GLUCOSE METER KIT    Use as instructed    CHOLECALCIFEROL, VITAMIN D3, (VITAMIN D3) 1,000 UNIT CAPSULE    Take 5,000 Units by mouth once daily.    CLONAZEPAM (KLONOPIN) 1 MG TABLET    Take 1 tablet (1 mg total) by mouth every evening.    FISH OIL-OMEGA-3 FATTY ACIDS 300-1,000 MG CAPSULE    Take 1 capsule by mouth once daily.    FLUTICASONE PROPIONATE (FLONASE) 50 MCG/ACTUATION NASAL SPRAY    2 sprays (100 mcg total) by Each Nostril route once daily.    FUROSEMIDE (LASIX) 40 MG TABLET    Take 40 mg by mouth 2 (two) times daily.    GABAPENTIN (NEURONTIN) 300 MG CAPSULE    Take 2 capsules (600 mg total) by mouth every evening. Can take 3 caps qhs or tid if tolerated, may cause drowsiness    LANCETS MISC    Check blood glucose levels daily in the AM fasting and 1-2 times more daily.    LEVOCETIRIZINE (XYZAL) 5 MG TABLET    Take 1 tablet (5 mg total) by mouth every evening.    MULTIVITAMIN CAPSULE    Take 1 capsule by mouth once daily.    SPIRONOLACTONE (ALDACTONE) 50 MG TABLET    Take 50 mg by mouth once daily.   Modified Medications    Modified Medication Previous Medication    APIXABAN (ELIQUIS) 5 MG TAB apixaban (ELIQUIS) 5 mg Tab       Take 1 tablet (5 mg total) by mouth 2 (two) times daily.    Take 1 tablet (5 mg total) by mouth 2 (two) times daily.  "   CLOPIDOGREL (PLAVIX) 75 MG TABLET clopidogreL (PLAVIX) 75 mg tablet       Take 1 tablet (75 mg total) by mouth once daily.    Take 75 mg by mouth once daily.    PANTOPRAZOLE (PROTONIX) 40 MG TABLET pantoprazole (PROTONIX) 40 MG tablet       Take 1 tablet (40 mg total) by mouth once daily.    Take 1 tablet (40 mg total) by mouth once daily.   Discontinued Medications    ASPIRIN (ECOTRIN) 81 MG EC TABLET    Take 81 mg by mouth.    ENTRESTO 24-26 MG PER TABLET           Review of Systems   Constitutional: Negative.    HENT: Negative.    Eyes: Negative.    Respiratory: Negative.    Cardiovascular: Negative.  Negative for chest pain.   Gastrointestinal: Negative.    Genitourinary: Negative.    Musculoskeletal: Negative.    Skin: Negative.    Neurological: Negative.    Endo/Heme/Allergies: Negative.    Psychiatric/Behavioral: Negative.    All 12 systems otherwise negative.      Wt Readings from Last 3 Encounters:   11/03/20 96.5 kg (212 lb 11.9 oz)   11/01/20 96.7 kg (213 lb 3 oz)   10/23/20 96.5 kg (212 lb 11.9 oz)     Temp Readings from Last 3 Encounters:   11/01/20 98.1 °F (36.7 °C) (Oral)   10/23/20 98.2 °F (36.8 °C) (Oral)   10/12/20 98 °F (36.7 °C) (Temporal)     BP Readings from Last 3 Encounters:   11/03/20 130/62   11/01/20 128/60   10/23/20 133/63     Pulse Readings from Last 3 Encounters:   11/03/20 77   11/01/20 61   10/23/20 62       /62 (BP Location: Left arm, Patient Position: Sitting, BP Method: Large (Manual))   Pulse 77   Resp 16   Ht 5' 9" (1.753 m)   Wt 96.5 kg (212 lb 11.9 oz)   SpO2 98%   BMI 31.42 kg/m²     Objective:   Physical Exam   Constitutional: He is oriented to person, place, and time. He appears well-developed and well-nourished. No distress.   HENT:   Head: Normocephalic and atraumatic.   Nose: Nose normal.   Mouth/Throat: Oropharynx is clear and moist.   Eyes: Conjunctivae and EOM are normal. Right eye exhibits no discharge. Left eye exhibits no discharge. No scleral " icterus.   Neck: Normal range of motion. Neck supple. No JVD present. No thyromegaly present.   Cardiovascular: Normal rate, regular rhythm, S1 normal and S2 normal. Exam reveals no gallop, no S3, no S4 and no friction rub.   Murmur heard.  Pulmonary/Chest: Effort normal and breath sounds normal. No stridor. No respiratory distress. He has no wheezes. He has no rales. He exhibits no tenderness.   Abdominal: Soft. Bowel sounds are normal. He exhibits no distension and no mass. There is no abdominal tenderness. There is no rebound.   Genitourinary:    Genitourinary Comments: Deferred     Musculoskeletal: Normal range of motion.         General: No tenderness, deformity or edema.   Lymphadenopathy:     He has no cervical adenopathy.   Neurological: He is alert and oriented to person, place, and time. He exhibits normal muscle tone. Coordination normal.   Skin: Skin is warm and dry. No rash noted. He is not diaphoretic. No erythema. No pallor.   Psychiatric: He has a normal mood and affect. His behavior is normal. Judgment and thought content normal.   Nursing note and vitals reviewed.      Lab Results   Component Value Date     11/01/2020    K 3.9 11/01/2020     11/01/2020    CO2 23 11/01/2020    BUN 23 11/01/2020    CREATININE 2.0 (H) 11/01/2020     (H) 11/01/2020    HGBA1C 6.1 (H) 09/17/2020    MG 1.8 04/27/2020    AST 23 11/01/2020    ALT 13 11/01/2020    ALBUMIN 4.1 11/01/2020    PROT 7.7 11/01/2020    BILITOT 0.4 11/01/2020    WBC 6.73 11/01/2020    HGB 7.6 (L) 11/01/2020    HCT 25.8 (L) 11/01/2020    MCV 85 11/01/2020     (H) 11/01/2020    INR 1.2 11/01/2020    TSH 2.181 01/12/2018    CHOL 171 03/20/2020    HDL 38 (L) 03/20/2020    LDLCALC 75.0 03/20/2020    TRIG 290 (H) 03/20/2020    BNP 85 04/27/2020     Assessment:      1. Coronary artery disease of native artery of native heart with stable angina pectoris    2. S/P CABG x 3    3. Paroxysmal atrial fibrillation    4. Chronic combined  systolic and diastolic congestive heart failure    5. ICD (implantable cardioverter-defibrillator) in place    6. Mixed hyperlipidemia    7. Essential hypertension    8. PAF (paroxysmal atrial fibrillation)        Plan:   1. CAD s/p CABG, old MI  - cont meds - cont plavix  - stable    2. HFPEF ith TR/MR, improved EF  - cont meds - cont lasix to 40mg daily  - cont to monitor valve disease  - change Entresto to Losartan     3. PAF had episode of PNA  - cont meds - Eliquis     4. NASREEN  - cont CPAP    5. Restricive/obstrucive lung disease  - cont tx per PCP/pulm    6. DM2 6.0   - cont tx per PCP    7. AVB s/p ICD  - cont to monitor  - refer to device clinic    8. Anemia sec to GIB  - f/u with GI    Thank you for allowing me to participate in this patient's care. Please do not hesitate to contact me with any questions or concerns. Consult note has been forwarded to the referral physician.

## 2020-11-04 ENCOUNTER — OFFICE VISIT (OUTPATIENT)
Dept: HEMATOLOGY/ONCOLOGY | Facility: CLINIC | Age: 80
End: 2020-11-04
Payer: MEDICARE

## 2020-11-04 ENCOUNTER — LAB VISIT (OUTPATIENT)
Dept: LAB | Facility: HOSPITAL | Age: 80
End: 2020-11-04
Attending: INTERNAL MEDICINE
Payer: MEDICARE

## 2020-11-04 VITALS
TEMPERATURE: 98 F | DIASTOLIC BLOOD PRESSURE: 63 MMHG | BODY MASS INDEX: 30.14 KG/M2 | HEIGHT: 70 IN | WEIGHT: 210.56 LBS | SYSTOLIC BLOOD PRESSURE: 108 MMHG | HEART RATE: 86 BPM | OXYGEN SATURATION: 98 %

## 2020-11-04 DIAGNOSIS — D51.0 VITAMIN B12 DEFICIENCY ANEMIA DUE TO INTRINSIC FACTOR DEFICIENCY: ICD-10-CM

## 2020-11-04 DIAGNOSIS — C61 ADENOCARCINOMA OF PROSTATE: ICD-10-CM

## 2020-11-04 DIAGNOSIS — D63.1 ANEMIA ASSOCIATED WITH STAGE 4 CHRONIC RENAL FAILURE: Primary | ICD-10-CM

## 2020-11-04 DIAGNOSIS — D50.0 IRON DEFICIENCY ANEMIA DUE TO CHRONIC BLOOD LOSS: ICD-10-CM

## 2020-11-04 DIAGNOSIS — R94.6 ABNORMAL RESULTS OF THYROID FUNCTION STUDIES: ICD-10-CM

## 2020-11-04 DIAGNOSIS — N18.4 ANEMIA ASSOCIATED WITH STAGE 4 CHRONIC RENAL FAILURE: ICD-10-CM

## 2020-11-04 DIAGNOSIS — N18.4 STAGE 4 CHRONIC KIDNEY DISEASE: ICD-10-CM

## 2020-11-04 DIAGNOSIS — N18.4 ANEMIA ASSOCIATED WITH STAGE 4 CHRONIC RENAL FAILURE: Primary | ICD-10-CM

## 2020-11-04 DIAGNOSIS — D63.1 ANEMIA ASSOCIATED WITH STAGE 4 CHRONIC RENAL FAILURE: ICD-10-CM

## 2020-11-04 DIAGNOSIS — I10 ESSENTIAL HYPERTENSION: Primary | ICD-10-CM

## 2020-11-04 LAB
LDH SERPL L TO P-CCNC: 169 U/L (ref 110–260)
RETICS/RBC NFR AUTO: 2 % (ref 0.4–2)
TSH SERPL DL<=0.005 MIU/L-ACNC: 1.27 UIU/ML (ref 0.4–4)

## 2020-11-04 PROCEDURE — 84165 PATHOLOGIST INTERPRETATION SPE: ICD-10-PCS | Mod: 26,HCNC,, | Performed by: PATHOLOGY

## 2020-11-04 PROCEDURE — 85045 AUTOMATED RETICULOCYTE COUNT: CPT | Mod: HCNC

## 2020-11-04 PROCEDURE — 1101F PR PT FALLS ASSESS DOC 0-1 FALLS W/OUT INJ PAST YR: ICD-10-PCS | Mod: HCNC,CPTII,S$GLB, | Performed by: INTERNAL MEDICINE

## 2020-11-04 PROCEDURE — 83540 ASSAY OF IRON: CPT | Mod: HCNC

## 2020-11-04 PROCEDURE — 99999 PR PBB SHADOW E&M-EST. PATIENT-LVL III: ICD-10-PCS | Mod: PBBFAC,HCNC,, | Performed by: INTERNAL MEDICINE

## 2020-11-04 PROCEDURE — 1126F AMNT PAIN NOTED NONE PRSNT: CPT | Mod: HCNC,S$GLB,, | Performed by: INTERNAL MEDICINE

## 2020-11-04 PROCEDURE — 36415 COLL VENOUS BLD VENIPUNCTURE: CPT | Mod: HCNC

## 2020-11-04 PROCEDURE — 1159F MED LIST DOCD IN RCRD: CPT | Mod: HCNC,S$GLB,, | Performed by: INTERNAL MEDICINE

## 2020-11-04 PROCEDURE — 3078F DIAST BP <80 MM HG: CPT | Mod: HCNC,CPTII,S$GLB, | Performed by: INTERNAL MEDICINE

## 2020-11-04 PROCEDURE — 84153 ASSAY OF PSA TOTAL: CPT | Mod: HCNC

## 2020-11-04 PROCEDURE — 3074F PR MOST RECENT SYSTOLIC BLOOD PRESSURE < 130 MM HG: ICD-10-PCS | Mod: HCNC,CPTII,S$GLB, | Performed by: INTERNAL MEDICINE

## 2020-11-04 PROCEDURE — 84165 PROTEIN E-PHORESIS SERUM: CPT | Mod: 26,HCNC,, | Performed by: PATHOLOGY

## 2020-11-04 PROCEDURE — 1159F PR MEDICATION LIST DOCUMENTED IN MEDICAL RECORD: ICD-10-PCS | Mod: HCNC,S$GLB,, | Performed by: INTERNAL MEDICINE

## 2020-11-04 PROCEDURE — 3074F SYST BP LT 130 MM HG: CPT | Mod: HCNC,CPTII,S$GLB, | Performed by: INTERNAL MEDICINE

## 2020-11-04 PROCEDURE — 99499 UNLISTED E&M SERVICE: CPT | Mod: S$GLB,,, | Performed by: INTERNAL MEDICINE

## 2020-11-04 PROCEDURE — 1126F PR PAIN SEVERITY QUANTIFIED, NO PAIN PRESENT: ICD-10-PCS | Mod: HCNC,S$GLB,, | Performed by: INTERNAL MEDICINE

## 2020-11-04 PROCEDURE — 99999 PR PBB SHADOW E&M-EST. PATIENT-LVL III: CPT | Mod: PBBFAC,HCNC,, | Performed by: INTERNAL MEDICINE

## 2020-11-04 PROCEDURE — 84443 ASSAY THYROID STIM HORMONE: CPT | Mod: HCNC

## 2020-11-04 PROCEDURE — 84165 PROTEIN E-PHORESIS SERUM: CPT | Mod: HCNC

## 2020-11-04 PROCEDURE — 83615 LACTATE (LD) (LDH) ENZYME: CPT | Mod: HCNC

## 2020-11-04 PROCEDURE — 99499 RISK ADDL DX/OHS AUDIT: ICD-10-PCS | Mod: S$GLB,,, | Performed by: INTERNAL MEDICINE

## 2020-11-04 PROCEDURE — 83010 ASSAY OF HAPTOGLOBIN QUANT: CPT | Mod: HCNC

## 2020-11-04 PROCEDURE — 3078F PR MOST RECENT DIASTOLIC BLOOD PRESSURE < 80 MM HG: ICD-10-PCS | Mod: HCNC,CPTII,S$GLB, | Performed by: INTERNAL MEDICINE

## 2020-11-04 PROCEDURE — 99215 PR OFFICE/OUTPT VISIT, EST, LEVL V, 40-54 MIN: ICD-10-PCS | Mod: HCNC,S$GLB,, | Performed by: INTERNAL MEDICINE

## 2020-11-04 PROCEDURE — 82607 VITAMIN B-12: CPT | Mod: HCNC

## 2020-11-04 PROCEDURE — 99215 OFFICE O/P EST HI 40 MIN: CPT | Mod: HCNC,S$GLB,, | Performed by: INTERNAL MEDICINE

## 2020-11-04 PROCEDURE — 1101F PT FALLS ASSESS-DOCD LE1/YR: CPT | Mod: HCNC,CPTII,S$GLB, | Performed by: INTERNAL MEDICINE

## 2020-11-04 PROCEDURE — 83520 IMMUNOASSAY QUANT NOS NONAB: CPT | Mod: HCNC

## 2020-11-04 PROCEDURE — 82728 ASSAY OF FERRITIN: CPT | Mod: HCNC

## 2020-11-04 RX ORDER — SODIUM CHLORIDE 0.9 % (FLUSH) 0.9 %
10 SYRINGE (ML) INJECTION
Status: CANCELLED | OUTPATIENT
Start: 2020-11-04

## 2020-11-04 RX ORDER — HEPARIN 100 UNIT/ML
500 SYRINGE INTRAVENOUS
Status: CANCELLED | OUTPATIENT
Start: 2020-11-04

## 2020-11-04 RX ORDER — SODIUM CHLORIDE 0.9 % (FLUSH) 0.9 %
10 SYRINGE (ML) INJECTION
Status: CANCELLED | OUTPATIENT
Start: 2020-11-21

## 2020-11-04 RX ORDER — HEPARIN 100 UNIT/ML
500 SYRINGE INTRAVENOUS
Status: CANCELLED | OUTPATIENT
Start: 2020-11-21

## 2020-11-04 NOTE — PROGRESS NOTES
Subjective:       Patient ID: Jake Ortiz is a 80 y.o. male.    Chief Complaint: Results, Anemia, and Chronic Renal Failure    HPI 80-year-old man seen greater than 1 year well by this department found have progressive anemia requiring blood transfusion patient has been found have renal insufficiency does not been seen by renal Medicine was asked to see the patient for further evaluation    Past Medical History:   Diagnosis Date    Abnormal PFT     Anemia     Arthritis     Atrial fibrillation 10/19/2017    Back pain     Congestive heart failure 3/5/2018    Coronary artery disease     Diabetes mellitus 2018     am 2018    DM (diabetes mellitus)      am 2020    Hyperlipemia     Hypertension     Myocardial infarction     Obesity     NASREEN (obstructive sleep apnea) 2018    Prostate cancer 2015    Tobacco dependence     Type 2 diabetes mellitus      Family History   Problem Relation Age of Onset    Heart attack Mother     Diabetes Mother     Heart disease Mother     Cataracts Mother     Stroke Father     Heart disease Father     Heart disease Brother      Social History     Socioeconomic History    Marital status:      Spouse name: Not on file    Number of children: Not on file    Years of education: Not on file    Highest education level: Not on file   Occupational History     Employer: [employer]   Social Needs    Financial resource strain: Not hard at all    Food insecurity     Worry: Never true     Inability: Never true    Transportation needs     Medical: No     Non-medical: No   Tobacco Use    Smoking status: Former Smoker     Packs/day: 1.50     Years: 20.00     Pack years: 30.00     Types: Cigarettes     Start date:      Quit date:      Years since quittin.8    Smokeless tobacco: Never Used   Substance and Sexual Activity    Alcohol use: No     Frequency: Never     Binge frequency: Never    Drug use: No    Sexual  activity: Not Currently   Lifestyle    Physical activity     Days per week: 0 days     Minutes per session: 30 min    Stress: Not at all   Relationships    Social connections     Talks on phone: Twice a week     Gets together: More than three times a week     Attends Congregational service: Not on file     Active member of club or organization: Yes     Attends meetings of clubs or organizations: More than 4 times per year     Relationship status:    Other Topics Concern    Not on file   Social History Narrative    Not on file     Past Surgical History:   Procedure Laterality Date    COLONOSCOPY N/A 9/22/2020    Procedure: COLONOSCOPY;  Surgeon: Javier Farmer MD;  Location: [facility] ENDO;  Service: Endoscopy;  Laterality: N/A;    CORONARY ARTERY BYPASS GRAFT  1987    EPIDURAL STEROID INJECTION N/A 11/22/2019    Procedure: Lumbar L5/S1 IL XUAN;  Surgeon: Tacho Trivedi MD;  Location: [facility] PAIN MGT;  Service: Pain Management;  Laterality: N/A;    EPIDURAL STEROID INJECTION N/A 1/6/2020    Procedure: Lumbar L5/S1 IL XUAN;  Surgeon: Tacho Trivedi MD;  Location: [facility] PAIN MGT;  Service: Pain Management;  Laterality: N/A;    EPIDURAL STEROID INJECTION N/A 2/10/2020    Procedure: Caudal XUAN;  Surgeon: Tacho Trivedi MD;  Location: [facility];  Service: Pain Management;  Laterality: N/A;    ESOPHAGOGASTRODUODENOSCOPY N/A 9/22/2020    Procedure: EGD (ESOPHAGOGASTRODUODENOSCOPY);  Surgeon: Javier Farmer MD;  Location: [facility] ENDO;  Service: Endoscopy;  Laterality: N/A;    INJECTION OF ANESTHETIC AGENT AROUND MEDIAL BRANCH NERVES INNERVATING LUMBAR FACET JOINT Bilateral 10/12/2020    Procedure: Bilateral L3-5 MBB;  Surgeon: Tacho Trivedi MD;  Location: [facility] PAIN MGT;  Service: Pain Management;  Laterality: Bilateral;    PROSTATE SURGERY      prostate radiation    SPINE SURGERY      fusion    TONSILLECTOMY         Labs:  Lab Results   Component Value Date    WBC 6.73 11/01/2020     HGB 7.6 (L) 11/01/2020    HCT 25.8 (L) 11/01/2020    MCV 85 11/01/2020     (H) 11/01/2020     BMP  Lab Results   Component Value Date     11/01/2020    K 3.9 11/01/2020     11/01/2020    CO2 23 11/01/2020    BUN 23 11/01/2020    CREATININE 2.0 (H) 11/01/2020    CALCIUM 9.2 11/01/2020    ANIONGAP 15 11/01/2020    ESTGFRAFRICA 36 (A) 11/01/2020    EGFRNONAA 31 (A) 11/01/2020     Lab Results   Component Value Date    ALT 13 11/01/2020    AST 23 11/01/2020    GGT 58 (H) 08/08/2018    ALKPHOS 68 11/01/2020    BILITOT 0.4 11/01/2020       Lab Results   Component Value Date    IRON 143 03/20/2019    TIBC 323 03/20/2019    FERRITIN 217 03/20/2019     Lab Results   Component Value Date    PLMTHVKI71 657 07/23/2014     Lab Results   Component Value Date    FOLATE 17.9 07/23/2014     Lab Results   Component Value Date    TSH 2.181 01/12/2018         Review of Systems   Constitutional: Positive for fatigue. Negative for activity change, appetite change, chills, diaphoresis, fever and unexpected weight change.   HENT: Negative for congestion, dental problem, drooling, ear discharge, ear pain, facial swelling, hearing loss, mouth sores, nosebleeds, postnasal drip, rhinorrhea, sinus pressure, sneezing, sore throat, tinnitus, trouble swallowing and voice change.    Eyes: Negative for photophobia, pain, discharge, redness, itching and visual disturbance.   Respiratory: Negative for apnea, cough, choking, chest tightness, shortness of breath, wheezing and stridor.    Cardiovascular: Negative for chest pain, palpitations and leg swelling.   Gastrointestinal: Negative for abdominal distention, abdominal pain, anal bleeding, blood in stool, constipation, diarrhea, nausea, rectal pain and vomiting.   Endocrine: Negative for cold intolerance, heat intolerance, polydipsia, polyphagia and polyuria.   Genitourinary: Negative for decreased urine volume, difficulty urinating, discharge, dysuria, enuresis, flank pain,  frequency, genital sores, hematuria, penile pain, penile swelling, scrotal swelling, testicular pain and urgency.   Musculoskeletal: Negative for arthralgias, back pain, gait problem, joint swelling, myalgias, neck pain and neck stiffness.   Skin: Negative for color change, pallor, rash and wound.   Allergic/Immunologic: Negative for environmental allergies, food allergies and immunocompromised state.   Neurological: Positive for weakness. Negative for dizziness, tremors, seizures, syncope, facial asymmetry, speech difficulty, light-headedness, numbness and headaches.   Hematological: Negative for adenopathy. Does not bruise/bleed easily.   Psychiatric/Behavioral: Positive for dysphoric mood. Negative for agitation, behavioral problems, confusion, decreased concentration, hallucinations, self-injury, sleep disturbance and suicidal ideas. The patient is nervous/anxious. The patient is not hyperactive.        Objective:      Physical Exam  Vitals signs reviewed.   Constitutional:       General: He is not in acute distress.     Appearance: He is well-developed. He is ill-appearing. He is not diaphoretic.   HENT:      Head: Normocephalic.      Right Ear: External ear normal.      Left Ear: External ear normal.      Nose: Nose normal.      Right Sinus: No maxillary sinus tenderness or frontal sinus tenderness.      Left Sinus: No maxillary sinus tenderness or frontal sinus tenderness.      Mouth/Throat:      Pharynx: No oropharyngeal exudate.   Eyes:      General: Lids are normal. No scleral icterus.        Right eye: No discharge.         Left eye: No discharge.      Extraocular Movements:      Right eye: Normal extraocular motion.      Left eye: Normal extraocular motion.      Conjunctiva/sclera:      Right eye: Right conjunctiva is not injected. No hemorrhage.     Left eye: Left conjunctiva is not injected. No hemorrhage.     Pupils: Pupils are equal, round, and reactive to light.   Neck:      Musculoskeletal: Normal  range of motion and neck supple.      Thyroid: No thyromegaly.      Vascular: No JVD.      Trachea: No tracheal deviation.   Cardiovascular:      Rate and Rhythm: Normal rate.   Pulmonary:      Effort: Pulmonary effort is normal. No respiratory distress.      Breath sounds: No stridor.   Abdominal:      General: Bowel sounds are normal.      Palpations: Abdomen is soft. There is no hepatomegaly, splenomegaly or mass.      Tenderness: There is no abdominal tenderness.   Musculoskeletal: Normal range of motion.         General: No tenderness.   Lymphadenopathy:      Head:      Right side of head: No posterior auricular or occipital adenopathy.      Left side of head: No posterior auricular or occipital adenopathy.      Cervical: No cervical adenopathy.      Right cervical: No superficial, deep or posterior cervical adenopathy.     Left cervical: No superficial, deep or posterior cervical adenopathy.      Upper Body:      Right upper body: No supraclavicular adenopathy.      Left upper body: No supraclavicular adenopathy.   Skin:     General: Skin is dry.      Findings: No erythema or rash.      Nails: There is no clubbing.     Neurological:      Mental Status: He is alert and oriented to person, place, and time.      Cranial Nerves: No cranial nerve deficit.      Coordination: Coordination normal.   Psychiatric:         Mood and Affect: Mood is anxious and depressed.         Behavior: Behavior normal.         Thought Content: Thought content normal.         Judgment: Judgment normal.             Assessment:      1. Anemia associated with stage 4 chronic renal failure    2. Iron deficiency anemia due to chronic blood loss    3. Stage 4 chronic kidney disease    4. Adenocarcinoma of prostate    5. Vitamin B12 deficiency anemia due to intrinsic factor deficiency     6. Abnormal results of thyroid function studies            Plan:       Review of laboratory studies suggest anemia secondary to renal failure.  At this point  orders have been placed for review of laboratory studies communicate through portal.  Assuming iron repleted will start on Procrit 30050 units q.2 weeks will last renal Medicine for evaluation discussed implications with he and his wife repeated numerous times with wife with was hard of hearing.  At this time initiate treatment obviously if patient has iron deficiency will need to be treated with intravenous iron 1st GI workup accomplish before erythropoietin therapy but will discuss with him by portal and follow-up in 7 days.  If the patient has increasing fatigue and weakness in needs another transfusion I have told him he could be transfused in the outpatient setting in the infusion center at our cancer center.  40 min face-to-face time coordination of care greater 50% time face-to-face with patient      Tristin Nath Jr, MD FACP

## 2020-11-05 LAB
ALBUMIN SERPL ELPH-MCNC: 4.27 G/DL (ref 3.35–5.55)
ALPHA1 GLOB SERPL ELPH-MCNC: 0.36 G/DL (ref 0.17–0.41)
ALPHA2 GLOB SERPL ELPH-MCNC: 1.07 G/DL (ref 0.43–0.99)
B-GLOBULIN SERPL ELPH-MCNC: 0.8 G/DL (ref 0.5–1.1)
COMPLEXED PSA SERPL-MCNC: <0.01 NG/ML (ref 0–4)
FERRITIN SERPL-MCNC: 14 NG/ML (ref 20–300)
GAMMA GLOB SERPL ELPH-MCNC: 0.9 G/DL (ref 0.67–1.58)
HAPTOGLOB SERPL-MCNC: 297 MG/DL (ref 30–250)
IRON SERPL-MCNC: 22 UG/DL (ref 45–160)
KAPPA LC SER QL IA: 3.89 MG/DL (ref 0.33–1.94)
KAPPA LC/LAMBDA SER IA: 1.07 (ref 0.26–1.65)
LAMBDA LC SER QL IA: 3.62 MG/DL (ref 0.57–2.63)
PATHOLOGIST INTERPRETATION SPE: NORMAL
PROT SERPL-MCNC: 7.4 G/DL (ref 6–8.4)
SATURATED IRON: 5 % (ref 20–50)
TOTAL IRON BINDING CAPACITY: 460 UG/DL (ref 250–450)
TRANSFERRIN SERPL-MCNC: 311 MG/DL (ref 200–375)
VIT B12 SERPL-MCNC: 819 PG/ML (ref 210–950)

## 2020-11-06 ENCOUNTER — PATIENT MESSAGE (OUTPATIENT)
Dept: HEMATOLOGY/ONCOLOGY | Facility: CLINIC | Age: 80
End: 2020-11-06

## 2020-11-09 ENCOUNTER — PATIENT MESSAGE (OUTPATIENT)
Dept: HEMATOLOGY/ONCOLOGY | Facility: CLINIC | Age: 80
End: 2020-11-09

## 2020-11-10 ENCOUNTER — TELEPHONE (OUTPATIENT)
Dept: HEMATOLOGY/ONCOLOGY | Facility: CLINIC | Age: 80
End: 2020-11-10

## 2020-11-10 DIAGNOSIS — N18.4 ANEMIA ASSOCIATED WITH STAGE 4 CHRONIC RENAL FAILURE: ICD-10-CM

## 2020-11-10 DIAGNOSIS — N18.4 STAGE 4 CHRONIC KIDNEY DISEASE: Primary | ICD-10-CM

## 2020-11-10 DIAGNOSIS — D63.1 ANEMIA ASSOCIATED WITH STAGE 4 CHRONIC RENAL FAILURE: ICD-10-CM

## 2020-11-10 NOTE — TELEPHONE ENCOUNTER
Patient scheduled for the first available appt with the kidney doctor. I asked my supervisor Zoie was this ok she verbalized if that's all we can do then take it and make sure he is on the waiting list patient has been informed of appt and waiting list. I have also sent a message to Nephrology for a poss. sooner appt.

## 2020-11-11 ENCOUNTER — OFFICE VISIT (OUTPATIENT)
Dept: INTERNAL MEDICINE | Facility: CLINIC | Age: 80
End: 2020-11-11
Payer: MEDICARE

## 2020-11-11 ENCOUNTER — NURSE TRIAGE (OUTPATIENT)
Dept: ADMINISTRATIVE | Facility: CLINIC | Age: 80
End: 2020-11-11

## 2020-11-11 ENCOUNTER — TELEPHONE (OUTPATIENT)
Dept: HEMATOLOGY/ONCOLOGY | Facility: CLINIC | Age: 80
End: 2020-11-11

## 2020-11-11 ENCOUNTER — PATIENT MESSAGE (OUTPATIENT)
Dept: CARDIOLOGY | Facility: CLINIC | Age: 80
End: 2020-11-11

## 2020-11-11 VITALS
HEIGHT: 70 IN | SYSTOLIC BLOOD PRESSURE: 102 MMHG | HEART RATE: 83 BPM | DIASTOLIC BLOOD PRESSURE: 50 MMHG | TEMPERATURE: 96 F | OXYGEN SATURATION: 97 % | WEIGHT: 209.19 LBS | RESPIRATION RATE: 18 BRPM | BODY MASS INDEX: 29.95 KG/M2

## 2020-11-11 DIAGNOSIS — I10 ESSENTIAL HYPERTENSION: Primary | ICD-10-CM

## 2020-11-11 DIAGNOSIS — I25.10 CORONARY ARTERY DISEASE INVOLVING NATIVE CORONARY ARTERY OF NATIVE HEART WITHOUT ANGINA PECTORIS: ICD-10-CM

## 2020-11-11 DIAGNOSIS — N18.4 STAGE 4 CHRONIC KIDNEY DISEASE: Primary | ICD-10-CM

## 2020-11-11 DIAGNOSIS — N18.30 STAGE 3 CHRONIC KIDNEY DISEASE, UNSPECIFIED WHETHER STAGE 3A OR 3B CKD: ICD-10-CM

## 2020-11-11 DIAGNOSIS — D50.9 IRON DEFICIENCY ANEMIA, UNSPECIFIED IRON DEFICIENCY ANEMIA TYPE: ICD-10-CM

## 2020-11-11 DIAGNOSIS — I48.0 PAROXYSMAL ATRIAL FIBRILLATION: ICD-10-CM

## 2020-11-11 PROCEDURE — 1126F PR PAIN SEVERITY QUANTIFIED, NO PAIN PRESENT: ICD-10-PCS | Mod: HCNC,S$GLB,, | Performed by: INTERNAL MEDICINE

## 2020-11-11 PROCEDURE — 99499 RISK ADDL DX/OHS AUDIT: ICD-10-PCS | Mod: S$GLB,,, | Performed by: INTERNAL MEDICINE

## 2020-11-11 PROCEDURE — 99499 UNLISTED E&M SERVICE: CPT | Mod: S$GLB,,, | Performed by: INTERNAL MEDICINE

## 2020-11-11 PROCEDURE — 3074F SYST BP LT 130 MM HG: CPT | Mod: HCNC,CPTII,S$GLB, | Performed by: INTERNAL MEDICINE

## 2020-11-11 PROCEDURE — 3078F DIAST BP <80 MM HG: CPT | Mod: HCNC,CPTII,S$GLB, | Performed by: INTERNAL MEDICINE

## 2020-11-11 PROCEDURE — 3074F PR MOST RECENT SYSTOLIC BLOOD PRESSURE < 130 MM HG: ICD-10-PCS | Mod: HCNC,CPTII,S$GLB, | Performed by: INTERNAL MEDICINE

## 2020-11-11 PROCEDURE — 1159F PR MEDICATION LIST DOCUMENTED IN MEDICAL RECORD: ICD-10-PCS | Mod: HCNC,S$GLB,, | Performed by: INTERNAL MEDICINE

## 2020-11-11 PROCEDURE — 1159F MED LIST DOCD IN RCRD: CPT | Mod: HCNC,S$GLB,, | Performed by: INTERNAL MEDICINE

## 2020-11-11 PROCEDURE — 1101F PR PT FALLS ASSESS DOC 0-1 FALLS W/OUT INJ PAST YR: ICD-10-PCS | Mod: HCNC,CPTII,S$GLB, | Performed by: INTERNAL MEDICINE

## 2020-11-11 PROCEDURE — 99214 OFFICE O/P EST MOD 30 MIN: CPT | Mod: HCNC,S$GLB,, | Performed by: INTERNAL MEDICINE

## 2020-11-11 PROCEDURE — 3078F PR MOST RECENT DIASTOLIC BLOOD PRESSURE < 80 MM HG: ICD-10-PCS | Mod: HCNC,CPTII,S$GLB, | Performed by: INTERNAL MEDICINE

## 2020-11-11 PROCEDURE — 99214 PR OFFICE/OUTPT VISIT, EST, LEVL IV, 30-39 MIN: ICD-10-PCS | Mod: HCNC,S$GLB,, | Performed by: INTERNAL MEDICINE

## 2020-11-11 PROCEDURE — 1101F PT FALLS ASSESS-DOCD LE1/YR: CPT | Mod: HCNC,CPTII,S$GLB, | Performed by: INTERNAL MEDICINE

## 2020-11-11 PROCEDURE — 99999 PR PBB SHADOW E&M-EST. PATIENT-LVL IV: CPT | Mod: PBBFAC,HCNC,, | Performed by: INTERNAL MEDICINE

## 2020-11-11 PROCEDURE — 1126F AMNT PAIN NOTED NONE PRSNT: CPT | Mod: HCNC,S$GLB,, | Performed by: INTERNAL MEDICINE

## 2020-11-11 PROCEDURE — 99999 PR PBB SHADOW E&M-EST. PATIENT-LVL IV: ICD-10-PCS | Mod: PBBFAC,HCNC,, | Performed by: INTERNAL MEDICINE

## 2020-11-11 NOTE — TELEPHONE ENCOUNTER
----- Message from Pat Bhatti sent at 11/11/2020  1:47 PM CST -----  Contact: [phone]/SELF  Type:  Needs Medical Advice    Who Called: Patient  Symptoms (please be specific):    How long has patient had these symptoms:    Pharmacy name and phone #:    Would the patient rather a call back or a response via [facility]? Call Back  Best Call Back Number: [phone]  Additional Information: Patient would like to speak with nurse regarding low blood pressure and feels he may need a blood transfusion    Huang/ALTAGRACIA

## 2020-11-11 NOTE — TELEPHONE ENCOUNTER
"Saw Md last week and is scheduled to get shots this month. Last night bp 110-119/61-70 now bp /56-60. Bp 97/56.  Pt wanted to know if he can go to the infusion suit to get blood. Pt stated he thinks he has a standing order for blood. Pt stated he feels good no dizziness, no lightheadedness. Care advice recommend pt see PCP today. Please call and advise pt in reference to getting blood from infusion suit.[phone]    Reason for Disposition   Systolic BP  while taking blood pressure medications and NOT dizzy, lightheaded or weak    Additional Information   Negative: Systolic BP < 90 and feeling dizzy, lightheaded, or weak   Negative: Started suddenly after an allergic medicine, an allergic food, or bee sting   Negative: Shock suspected (e.g., cold/pale/clammy skin, too weak to stand, low BP, rapid pulse)   Negative: Difficult to awaken or acting confused  (e.g., disoriented, slurred speech)   Negative: Fainted   Negative: Chest pain   Negative: Bleeding (e.g., vomiting blood, rectal bleeding or tarry stools, severe vaginal bleeding)   Negative: Extra heart beats or heart is beating fast  (i.e., "palpitations")   Negative: Sounds like a life-threatening emergency to the triager   Negative: Systolic BP < 80 and NOT dizzy, lightheaded or weak (feels normal)   Negative: Abdominal pain   Negative: Major surgery in the past month   Negative: Fever > 100.4 F (38.0 C)   Negative: Drinking very little and has signs of dehydration (e.g., no urine > 12 hours, very dry mouth, very lightheaded)   Negative: Fall in systolic BP > 20 mm Hg from normal and feeling dizzy, lightheaded, or weak   Negative: Patient sounds very sick or weak to the triager   Negative: Systolic BP < 90 and NOT dizzy, lightheaded or weak    Protocols used: LOW BLOOD PRESSURE-A-OH      "

## 2020-11-11 NOTE — PROGRESS NOTES
Subjective:       Patient ID: Jake Ortiz is a 80 y.o. male.    Chief Complaint: Hypotension    Jake Ortiz  80 y.o. White male    Patient presents with:  Hypotension    HPI: Presents to the clinic with complaint of low blood pressure readings lately. He had dizziness but that has resolved. Of note, he was switched from Entresto to losartan last week by his cardiologist due to cost but he misunderstood and has been taking both Entresto and losartan. He is seeing cardiology for CAD, CHF and atrial fibrillation.   He has a history of CAD, anemia and CKD III. He is seeing hematology and has been to the ER recently for a blood transfusion. He has an upcoming appointment with hematology. He will be seeing nephrology for chronic kidney disease.                HGB                      7.6 (L)             11/01/2020                HCT                      25.8 (L)            11/01/2020                MCV                      85                  11/01/2020                PLT                      401 (H)             11/01/2020                              IRON                     22 (L)              11/04/2020                TIBC                     460 (H)             11/04/2020                FERRITIN                 14 (L)              11/04/2020            BMP                    NA                       141                 11/01/2020                 K                        3.9                 11/01/2020                 CL                       103                 11/01/2020                 CO2                      23                  11/01/2020                 BUN                      23                  11/01/2020                 CREATININE               2.0 (H)             11/01/2020                 CALCIUM                  9.2                 11/01/2020                 ANIONGAP                 15                  11/01/2020                   EGFRNONAA                31 (A)              11/01/2020            He  takes Eliquis for atrial fibrillation.     Past Medical History:  Abnormal PFT  Anemia  Arthritis  10/19/2017: Atrial fibrillation  Back pain  3/5/2018: Congestive heart failure  Coronary artery disease  2018: DM (diabetes mellitus)  Hyperlipemia  Hypertension  Myocardial infarction  Obesity  6/5/2018: NASREEN (obstructive sleep apnea)  2015: Prostate cancer  Tobacco dependence    Current Outpatient Medications on File Prior to Visit:  acetaminophen (TYLENOL) 500 MG tablet, Take 500 mg by mouth every 6 (six) hours as needed for Pain., Disp: , Rfl:   apixaban (ELIQUIS) 5 mg Tab, Take 1 tablet (5 mg total) by mouth 2 (two) times daily., Disp: 180 tablet, Rfl: 1  atorvastatin (LIPITOR) 80 MG tablet, TK 1 T PO D, Disp: , Rfl: 3  blood sugar diagnostic Strp, Check blood glucose levels daily in the AM fasting and 1-2 times more daily., Disp: 300 strip, Rfl: 3  cholecalciferol, vitamin D3, (VITAMIN D3) 1,000 unit capsule, Take 5,000 Units by mouth once daily., Disp: , Rfl:   clonazePAM (KLONOPIN) 1 MG tablet, Take 1 tablet (1 mg total) by mouth every evening., Disp: 30 tablet, Rfl: 0  clopidogreL (PLAVIX) 75 mg tablet, Take 1 tablet (75 mg total) by mouth once daily., Disp: 90 tablet, Rfl: 1  fluticasone propionate (FLONASE) 50 mcg/actuation nasal spray, USE 2 SPRAYS IN EACH NOSTRIL EVERY DAY, Disp: 48 g, Rfl: 1  furosemide (LASIX) 40 MG tablet, Take 40 mg by mouth 2 (two) times daily., Disp: , Rfl:   gabapentin (NEURONTIN) 300 MG capsule, Take 2 capsules (600 mg total) by mouth every evening. Can take 3 caps qhs or tid if tolerated, may cause drowsiness, Disp: 180 capsule, Rfl: 0  lancets Misc, Check blood glucose levels daily in the AM fasting and 1-2 times more daily., Disp: 300 each, Rfl: 3  levocetirizine (XYZAL) 5 MG tablet, TAKE 1 TABLET EVERY EVENING, Disp: 90 tablet, Rfl: 1  losartan (COZAAR) 25 MG tablet, Take 1 tablet (25 mg total) by mouth once daily., Disp: 90 tablet, Rfl: 1  multivitamin capsule, Take 1 capsule  by mouth once daily., Disp: , Rfl:   pantoprazole (PROTONIX) 40 MG tablet, Take 1 tablet (40 mg total) by mouth once daily., Disp: 90 tablet, Rfl: 1  spironolactone (ALDACTONE) 50 MG tablet, Take 50 mg by mouth once daily., Disp: , Rfl:   blood-glucose meter kit, Use as instructed, Disp: 1 each, Rfl: 0  fish oil-omega-3 fatty acids 300-1,000 mg capsule, Take 1 capsule by mouth once daily., Disp: , Rfl:     Allergies:  Review of patient's allergies indicates:  No Known Allergies      Review of Systems   Constitutional: Negative for fever.   Respiratory: Negative for shortness of breath.    Cardiovascular: Negative for chest pain and leg swelling.   Neurological: Positive for dizziness. Negative for syncope and weakness.         Objective:      Physical Exam  Constitutional:       General: He is not in acute distress.     Appearance: He is well-developed. He is not ill-appearing.   Eyes:      General: No scleral icterus.  Cardiovascular:      Rate and Rhythm: Normal rate.   Pulmonary:      Effort: Pulmonary effort is normal. No respiratory distress.   Musculoskeletal:      Right lower leg: No edema.      Left lower leg: No edema.   Neurological:      Mental Status: He is alert and oriented to person, place, and time.   Psychiatric:         Behavior: Behavior normal.         Thought Content: Thought content normal.         Judgment: Judgment normal.         Assessment:       1. Essential hypertension    2. Coronary artery disease involving native coronary artery of native heart without angina pectoris    3. Iron deficiency anemia, unspecified iron deficiency anemia type    4. Stage 3 chronic kidney disease, unspecified whether stage 3a or 3b CKD        Plan:       Jake was seen today for hypotension.    Diagnoses and all orders for this visit:    Essential hypertension  -     Medications reviewed  -     Advised to hold losartan until Entresto is finished  -     Continue to monitor b/p    Coronary artery disease  involving native coronary artery of native heart without angina pectoris  -     F/U with cardiology    Iron deficiency anemia, unspecified iron deficiency anemia type  -     F/U with hematology    Stage 3 chronic kidney disease, unspecified whether stage 3a or 3b CKD  -     F/U with nephrology    RTC as needed.

## 2020-11-12 ENCOUNTER — OFFICE VISIT (OUTPATIENT)
Dept: HEMATOLOGY/ONCOLOGY | Facility: CLINIC | Age: 80
End: 2020-11-12
Payer: MEDICARE

## 2020-11-12 ENCOUNTER — LAB VISIT (OUTPATIENT)
Dept: LAB | Facility: HOSPITAL | Age: 80
End: 2020-11-12
Attending: INTERNAL MEDICINE
Payer: MEDICARE

## 2020-11-12 VITALS
HEIGHT: 70 IN | BODY MASS INDEX: 29.98 KG/M2 | TEMPERATURE: 98 F | WEIGHT: 209.44 LBS | HEART RATE: 81 BPM | SYSTOLIC BLOOD PRESSURE: 118 MMHG | OXYGEN SATURATION: 98 % | DIASTOLIC BLOOD PRESSURE: 74 MMHG | RESPIRATION RATE: 16 BRPM

## 2020-11-12 DIAGNOSIS — N18.4 STAGE 4 CHRONIC KIDNEY DISEASE: ICD-10-CM

## 2020-11-12 DIAGNOSIS — D50.0 IRON DEFICIENCY ANEMIA DUE TO CHRONIC BLOOD LOSS: Primary | ICD-10-CM

## 2020-11-12 LAB
ALBUMIN SERPL BCP-MCNC: 3.9 G/DL (ref 3.5–5.2)
ALP SERPL-CCNC: 66 U/L (ref 55–135)
ALT SERPL W/O P-5'-P-CCNC: 10 U/L (ref 10–44)
ANION GAP SERPL CALC-SCNC: 11 MMOL/L (ref 8–16)
AST SERPL-CCNC: 16 U/L (ref 10–40)
BASOPHILS # BLD AUTO: 0.04 K/UL (ref 0–0.2)
BASOPHILS NFR BLD: 0.7 % (ref 0–1.9)
BILIRUB SERPL-MCNC: 0.3 MG/DL (ref 0.1–1)
BUN SERPL-MCNC: 34 MG/DL (ref 8–23)
CALCIUM SERPL-MCNC: 8.9 MG/DL (ref 8.7–10.5)
CHLORIDE SERPL-SCNC: 105 MMOL/L (ref 95–110)
CO2 SERPL-SCNC: 24 MMOL/L (ref 23–29)
CREAT SERPL-MCNC: 1.9 MG/DL (ref 0.5–1.4)
DIFFERENTIAL METHOD: ABNORMAL
EOSINOPHIL # BLD AUTO: 0.1 K/UL (ref 0–0.5)
EOSINOPHIL NFR BLD: 2.4 % (ref 0–8)
ERYTHROCYTE [DISTWIDTH] IN BLOOD BY AUTOMATED COUNT: 15.8 % (ref 11.5–14.5)
EST. GFR  (AFRICAN AMERICAN): 38 ML/MIN/1.73 M^2
EST. GFR  (NON AFRICAN AMERICAN): 33 ML/MIN/1.73 M^2
GLUCOSE SERPL-MCNC: 177 MG/DL (ref 70–110)
HCT VFR BLD AUTO: 28.5 % (ref 40–54)
HGB BLD-MCNC: 8.5 G/DL (ref 14–18)
IMM GRANULOCYTES # BLD AUTO: 0.02 K/UL (ref 0–0.04)
IMM GRANULOCYTES NFR BLD AUTO: 0.4 % (ref 0–0.5)
LYMPHOCYTES # BLD AUTO: 1 K/UL (ref 1–4.8)
LYMPHOCYTES NFR BLD: 18.1 % (ref 18–48)
MCH RBC QN AUTO: 25.5 PG (ref 27–31)
MCHC RBC AUTO-ENTMCNC: 29.8 G/DL (ref 32–36)
MCV RBC AUTO: 86 FL (ref 82–98)
MONOCYTES # BLD AUTO: 0.5 K/UL (ref 0.3–1)
MONOCYTES NFR BLD: 8.9 % (ref 4–15)
NEUTROPHILS # BLD AUTO: 3.8 K/UL (ref 1.8–7.7)
NEUTROPHILS NFR BLD: 69.5 % (ref 38–73)
NRBC BLD-RTO: 0 /100 WBC
PLATELET # BLD AUTO: 344 K/UL (ref 150–350)
PMV BLD AUTO: 9 FL (ref 9.2–12.9)
POTASSIUM SERPL-SCNC: 4.4 MMOL/L (ref 3.5–5.1)
PROT SERPL-MCNC: 7.5 G/DL (ref 6–8.4)
RBC # BLD AUTO: 3.33 M/UL (ref 4.6–6.2)
SODIUM SERPL-SCNC: 140 MMOL/L (ref 136–145)
WBC # BLD AUTO: 5.51 K/UL (ref 3.9–12.7)

## 2020-11-12 PROCEDURE — 99214 OFFICE O/P EST MOD 30 MIN: CPT | Mod: HCNC,S$GLB,, | Performed by: NURSE PRACTITIONER

## 2020-11-12 PROCEDURE — 3074F SYST BP LT 130 MM HG: CPT | Mod: HCNC,CPTII,S$GLB, | Performed by: NURSE PRACTITIONER

## 2020-11-12 PROCEDURE — 3074F PR MOST RECENT SYSTOLIC BLOOD PRESSURE < 130 MM HG: ICD-10-PCS | Mod: HCNC,CPTII,S$GLB, | Performed by: NURSE PRACTITIONER

## 2020-11-12 PROCEDURE — 1159F PR MEDICATION LIST DOCUMENTED IN MEDICAL RECORD: ICD-10-PCS | Mod: HCNC,S$GLB,, | Performed by: NURSE PRACTITIONER

## 2020-11-12 PROCEDURE — 1101F PR PT FALLS ASSESS DOC 0-1 FALLS W/OUT INJ PAST YR: ICD-10-PCS | Mod: HCNC,CPTII,S$GLB, | Performed by: NURSE PRACTITIONER

## 2020-11-12 PROCEDURE — 1159F MED LIST DOCD IN RCRD: CPT | Mod: HCNC,S$GLB,, | Performed by: NURSE PRACTITIONER

## 2020-11-12 PROCEDURE — 99999 PR PBB SHADOW E&M-EST. PATIENT-LVL V: CPT | Mod: PBBFAC,HCNC,, | Performed by: NURSE PRACTITIONER

## 2020-11-12 PROCEDURE — 1126F AMNT PAIN NOTED NONE PRSNT: CPT | Mod: HCNC,S$GLB,, | Performed by: NURSE PRACTITIONER

## 2020-11-12 PROCEDURE — 99999 PR PBB SHADOW E&M-EST. PATIENT-LVL V: ICD-10-PCS | Mod: PBBFAC,HCNC,, | Performed by: NURSE PRACTITIONER

## 2020-11-12 PROCEDURE — 85025 COMPLETE CBC W/AUTO DIFF WBC: CPT | Mod: HCNC

## 2020-11-12 PROCEDURE — 80053 COMPREHEN METABOLIC PANEL: CPT | Mod: HCNC

## 2020-11-12 PROCEDURE — 1126F PR PAIN SEVERITY QUANTIFIED, NO PAIN PRESENT: ICD-10-PCS | Mod: HCNC,S$GLB,, | Performed by: NURSE PRACTITIONER

## 2020-11-12 PROCEDURE — 3078F DIAST BP <80 MM HG: CPT | Mod: HCNC,CPTII,S$GLB, | Performed by: NURSE PRACTITIONER

## 2020-11-12 PROCEDURE — 3288F PR FALLS RISK ASSESSMENT DOCUMENTED: ICD-10-PCS | Mod: HCNC,CPTII,S$GLB, | Performed by: NURSE PRACTITIONER

## 2020-11-12 PROCEDURE — 3288F FALL RISK ASSESSMENT DOCD: CPT | Mod: HCNC,CPTII,S$GLB, | Performed by: NURSE PRACTITIONER

## 2020-11-12 PROCEDURE — 99214 PR OFFICE/OUTPT VISIT, EST, LEVL IV, 30-39 MIN: ICD-10-PCS | Mod: HCNC,S$GLB,, | Performed by: NURSE PRACTITIONER

## 2020-11-12 PROCEDURE — 3078F PR MOST RECENT DIASTOLIC BLOOD PRESSURE < 80 MM HG: ICD-10-PCS | Mod: HCNC,CPTII,S$GLB, | Performed by: NURSE PRACTITIONER

## 2020-11-12 PROCEDURE — 1101F PT FALLS ASSESS-DOCD LE1/YR: CPT | Mod: HCNC,CPTII,S$GLB, | Performed by: NURSE PRACTITIONER

## 2020-11-12 RX ORDER — FUROSEMIDE 40 MG/1
40 TABLET ORAL 2 TIMES DAILY
Qty: 180 TABLET | Refills: 1 | Status: SHIPPED | OUTPATIENT
Start: 2020-11-12 | End: 2021-06-17 | Stop reason: SDUPTHER

## 2020-11-12 NOTE — PROGRESS NOTES
Subjective:       Patient ID: Jake Ortiz is a 80 y.o. male.    Chief Complaint: f/u anemia    HPI: 80 y.o male with CKD, anemia, iron deficiency presenting today for follow up of his anemia to determine need for blood transfusion. Patient has known h/o iron deficiency with unremarkable EGD/Colonoscopy. Unable to have VCE due to pacemaker contraindication. Patient has received IV iron in the past. Not currently on any iron supplementation. Denies anemia symptoms.   Social History     Socioeconomic History    Marital status:      Spouse name: Not on file    Number of children: Not on file    Years of education: Not on file    Highest education level: Not on file   Occupational History     Employer: [employer]   Social Needs    Financial resource strain: Not hard at all    Food insecurity     Worry: Never true     Inability: Never true    Transportation needs     Medical: No     Non-medical: No   Tobacco Use    Smoking status: Former Smoker     Packs/day: 1.50     Years: 20.00     Pack years: 30.00     Types: Cigarettes     Start date:      Quit date:      Years since quittin.8    Smokeless tobacco: Never Used   Substance and Sexual Activity    Alcohol use: No     Frequency: Never     Binge frequency: Never    Drug use: No    Sexual activity: Not Currently   Lifestyle    Physical activity     Days per week: 0 days     Minutes per session: 30 min    Stress: Not at all   Relationships    Social connections     Talks on phone: Twice a week     Gets together: More than three times a week     Attends Protestant service: Not on file     Active member of club or organization: Yes     Attends meetings of clubs or organizations: More than 4 times per year     Relationship status:    Other Topics Concern    Not on file   Social History Narrative    Not on file       Past Medical History:   Diagnosis Date    Abnormal PFT     Anemia     Arthritis     Atrial fibrillation  10/19/2017    Back pain     Congestive heart failure 3/5/2018    Coronary artery disease     DM (diabetes mellitus) 2018     am 06/23/2020    Hyperlipemia     Hypertension     Myocardial infarction     Obesity     NASREEN (obstructive sleep apnea) 6/5/2018    Prostate cancer 2015    Tobacco dependence     Type 2 diabetes mellitus        Family History   Problem Relation Age of Onset    Heart attack Mother     Diabetes Mother     Heart disease Mother     Cataracts Mother     Stroke Father     Heart disease Father     Heart disease Brother        Past Surgical History:   Procedure Laterality Date    COLONOSCOPY N/A 9/22/2020    Procedure: COLONOSCOPY;  Surgeon: Javier Farmer MD;  Location: [facility] ENDO;  Service: Endoscopy;  Laterality: N/A;    CORONARY ARTERY BYPASS GRAFT  1987    EPIDURAL STEROID INJECTION N/A 11/22/2019    Procedure: Lumbar L5/S1 IL XUAN;  Surgeon: Tacho Trivedi MD;  Location: [facility] PAIN [facility]GT;  Service: Pain Management;  Laterality: N/A;    EPIDURAL STEROID INJECTION N/A 1/6/2020    Procedure: Lumbar L5/S1 IL XUAN;  Surgeon: Tacho Trivedi MD;  Location: [facility];  Service: Pain Management;  Laterality: N/A;    EPIDURAL STEROID INJECTION N/A 2/10/2020    Procedure: Caudal XUAN;  Surgeon: Tacho Trivedi MD;  Location: [facility];  Service: Pain Management;  Laterality: N/A;    ESOPHAGOGASTRODUODENOSCOPY N/A 9/22/2020    Procedure: EGD (ESOPHAGOGASTRODUODENOSCOPY);  Surgeon: Javier Farmer MD;  Location: [facility] ENDO;  Service: Endoscopy;  Laterality: N/A;    INJECTION OF ANESTHETIC AGENT AROUND MEDIAL BRANCH NERVES INNERVATING LUMBAR FACET JOINT Bilateral 10/12/2020    Procedure: Bilateral L3-5 MBB;  Surgeon: Tacho Trivedi MD;  Location: [facility];  Service: Pain Management;  Laterality: Bilateral;    PROSTATE SURGERY      prostate radiation    SPINE SURGERY      fusion    TONSILLECTOMY         Review of Systems   Constitutional:  Negative for appetite change, chills, fatigue, fever and unexpected weight change.   HENT: Negative for congestion, mouth sores, nosebleeds, sore throat, trouble swallowing and voice change.    Eyes: Negative for photophobia and visual disturbance.   Respiratory: Negative for cough, chest tightness, shortness of breath and wheezing.    Cardiovascular: Negative for chest pain and leg swelling.   Gastrointestinal: Negative for abdominal distention, abdominal pain, blood in stool, constipation, diarrhea, nausea and vomiting.   Genitourinary: Negative for difficulty urinating, dysuria and hematuria.   Musculoskeletal: Negative for arthralgias, back pain and myalgias.   Skin: Negative for pallor, rash and wound.   Neurological: Negative for dizziness, syncope, weakness and headaches.   Hematological: Negative for adenopathy. Does not bruise/bleed easily.   Psychiatric/Behavioral: The patient is not nervous/anxious.    All other systems reviewed and are negative.        Medication List with Changes/Refills   Current Medications    ACETAMINOPHEN (TYLENOL) 500 MG TABLET    Take 500 mg by mouth every 6 (six) hours as needed for Pain.    APIXABAN (ELIQUIS) 5 MG TAB    Take 1 tablet (5 mg total) by mouth 2 (two) times daily.    ATORVASTATIN (LIPITOR) 80 MG TABLET    TK 1 T PO D    BLOOD SUGAR DIAGNOSTIC STRP    Check blood glucose levels daily in the AM fasting and 1-2 times more daily.    BLOOD-GLUCOSE METER KIT    Use as instructed    CHOLECALCIFEROL, VITAMIN D3, (VITAMIN D3) 1,000 UNIT CAPSULE    Take 5,000 Units by mouth once daily.    CLONAZEPAM (KLONOPIN) 1 MG TABLET    Take 1 tablet (1 mg total) by mouth every evening.    CLOPIDOGREL (PLAVIX) 75 MG TABLET    Take 1 tablet (75 mg total) by mouth once daily.    FISH OIL-OMEGA-3 FATTY ACIDS 300-1,000 MG CAPSULE    Take 1 capsule by mouth once daily.    FLUTICASONE PROPIONATE (FLONASE) 50 MCG/ACTUATION NASAL SPRAY    USE 2 SPRAYS IN EACH NOSTRIL EVERY DAY    FUROSEMIDE  (LASIX) 40 MG TABLET    Take 1 tablet (40 mg total) by mouth 2 (two) times daily.    GABAPENTIN (NEURONTIN) 300 MG CAPSULE    Take 2 capsules (600 mg total) by mouth every evening. Can take 3 caps qhs or tid if tolerated, may cause drowsiness    LANCETS MISC    Check blood glucose levels daily in the AM fasting and 1-2 times more daily.    LEVOCETIRIZINE (XYZAL) 5 MG TABLET    TAKE 1 TABLET EVERY EVENING    LOSARTAN (COZAAR) 25 MG TABLET    Take 1 tablet (25 mg total) by mouth once daily.    MULTIVITAMIN CAPSULE    Take 1 capsule by mouth once daily.    PANTOPRAZOLE (PROTONIX) 40 MG TABLET    Take 1 tablet (40 mg total) by mouth once daily.    SPIRONOLACTONE (ALDACTONE) 50 MG TABLET    Take 50 mg by mouth once daily.     Objective:     Vitals:    11/12/20 1306   BP: 118/74   Pulse: 81   Resp: 16   Temp: 98.2 °F (36.8 °C)     Lab Results   Component Value Date    WBC 5.51 11/12/2020    HGB 8.5 (L) 11/12/2020    HCT 28.5 (L) 11/12/2020    MCV 86 11/12/2020     11/12/2020       BMP  Lab Results   Component Value Date     11/12/2020    K 4.4 11/12/2020     11/12/2020    CO2 24 11/12/2020    BUN 34 (H) 11/12/2020    CREATININE 1.9 (H) 11/12/2020    CALCIUM 8.9 11/12/2020    ANIONGAP 11 11/12/2020    ESTGFRAFRICA 38 (A) 11/12/2020    EGFRNONAA 33 (A) 11/12/2020     Lab Results   Component Value Date    ALT 10 11/12/2020    AST 16 11/12/2020    GGT 58 (H) 08/08/2018    ALKPHOS 66 11/12/2020    BILITOT 0.3 11/12/2020     Lab Results   Component Value Date    IRON 22 (L) 11/04/2020    TIBC 460 (H) 11/04/2020    FERRITIN 14 (L) 11/04/2020         Physical Exam  Vitals signs reviewed.   Constitutional:       Appearance: He is well-developed.   HENT:      Head: Normocephalic.      Right Ear: External ear normal.      Left Ear: External ear normal.      Nose: Nose normal.   Eyes:      General: Lids are normal. No scleral icterus.        Right eye: No discharge.         Left eye: No discharge.       Conjunctiva/sclera: Conjunctivae normal.   Neck:      Musculoskeletal: Normal range of motion.      Thyroid: No thyroid mass.   Cardiovascular:      Rate and Rhythm: Normal rate and regular rhythm.      Heart sounds: Normal heart sounds. No murmur.   Pulmonary:      Effort: Pulmonary effort is normal. No respiratory distress.      Breath sounds: Normal breath sounds. No wheezing or rales.   Abdominal:      General: Bowel sounds are normal. There is no distension.      Palpations: Abdomen is soft.      Tenderness: There is no abdominal tenderness.   Genitourinary:     Comments: deferred  Musculoskeletal: Normal range of motion.   Skin:     General: Skin is warm and dry.   Neurological:      Mental Status: He is alert and oriented to person, place, and time.   Psychiatric:         Speech: Speech normal.         Behavior: Behavior normal. Behavior is cooperative.         Thought Content: Thought content normal.          Assessment:     Problem List Items Addressed This Visit        Renal/    Stage 4 chronic kidney disease    Relevant Orders    Ambulatory referral/consult to Gastroenterology    Occult blood x 1, stool    Occult blood x 1, stool    Occult blood x 1, stool    Urinalysis    CBC Auto Differential    Comprehensive Metabolic Panel    Iron and TIBC    Ferritin    CBC Auto Differential       Oncology    Iron deficiency anemia due to chronic blood loss - Primary     Anemia likely multifactorial including iron deficiency and CKD    Arrange IV iron x 2 doses. Repeat cbc on day of dose 2 Injectafer. Check stool for occult blood x 3. Check UA to r/o hematuria. Consider maintenance oral iron following iron infusions. Initiate Retacrit following iron repletion if hg remains less than 10. If cbc stable in 2 weeks will plan on a 4 week follow up with repeat labs. Sooner follow up as needed         Relevant Orders    Ambulatory referral/consult to Gastroenterology    Occult blood x 1, stool    Occult blood x 1, stool     Occult blood x 1, stool    Urinalysis    CBC Auto Differential    Comprehensive Metabolic Panel    Iron and TIBC    Ferritin    CBC Auto Differential            Plan:     Iron deficiency anemia due to chronic blood loss  -     Ambulatory referral/consult to Gastroenterology; Future; Expected date: 11/19/2020  -     Occult blood x 1, stool; Future; Expected date: 11/12/2020  -     Occult blood x 1, stool; Future; Expected date: 11/12/2020  -     Occult blood x 1, stool; Future; Expected date: 11/12/2020  -     Urinalysis; Future; Expected date: 11/12/2020  -     CBC Auto Differential; Future; Expected date: 11/12/2020  -     Comprehensive Metabolic Panel; Future; Expected date: 11/12/2020  -     Iron and TIBC; Future; Expected date: 11/12/2020  -     Ferritin; Future; Expected date: 11/12/2020  -     CBC Auto Differential; Future; Expected date: 11/12/2020    Stage 4 chronic kidney disease  -     Ambulatory referral/consult to Gastroenterology; Future; Expected date: 11/19/2020  -     Occult blood x 1, stool; Future; Expected date: 11/12/2020  -     Occult blood x 1, stool; Future; Expected date: 11/12/2020  -     Occult blood x 1, stool; Future; Expected date: 11/12/2020  -     Urinalysis; Future; Expected date: 11/12/2020  -     CBC Auto Differential; Future; Expected date: 11/12/2020  -     Comprehensive Metabolic Panel; Future; Expected date: 11/12/2020  -     Iron and TIBC; Future; Expected date: 11/12/2020  -     Ferritin; Future; Expected date: 11/12/2020  -     CBC Auto Differential; Future; Expected date: 11/12/2020          DANIEL Shearer

## 2020-11-12 NOTE — ASSESSMENT & PLAN NOTE
Anemia likely multifactorial including iron deficiency and CKD    GI referral for iron deficiency. Patient reports unable to have video capsule in the past due to having pacemaker which is apparently contraindicated with VCE. GI recommends close follow up with lab monitoring    Arrange IV iron x 2 doses. Repeat cbc on day of dose 2 Injectafer. Check stool for occult blood x 3. Check UA to r/o hematuria. Consider maintenance oral iron following iron infusions. Initiate Retacrit following iron repletion if hg remains less than 10. If cbc stable in 2 weeks will plan on a 4 week follow up with repeat labs. Sooner follow up as needed. Discussed in detail anemia S&S to report

## 2020-11-14 ENCOUNTER — PATIENT MESSAGE (OUTPATIENT)
Dept: CARDIOLOGY | Facility: CLINIC | Age: 80
End: 2020-11-14

## 2020-11-14 ENCOUNTER — PATIENT MESSAGE (OUTPATIENT)
Dept: INTERNAL MEDICINE | Facility: CLINIC | Age: 80
End: 2020-11-14

## 2020-11-16 ENCOUNTER — LAB VISIT (OUTPATIENT)
Dept: LAB | Facility: HOSPITAL | Age: 80
End: 2020-11-16
Attending: INTERNAL MEDICINE
Payer: MEDICARE

## 2020-11-16 DIAGNOSIS — D50.0 IRON DEFICIENCY ANEMIA DUE TO CHRONIC BLOOD LOSS: ICD-10-CM

## 2020-11-16 DIAGNOSIS — N18.4 STAGE 4 CHRONIC KIDNEY DISEASE: ICD-10-CM

## 2020-11-16 LAB
BILIRUB UR QL STRIP: NEGATIVE
CLARITY UR: CLEAR
COLOR UR: YELLOW
GLUCOSE UR QL STRIP: NEGATIVE
HGB UR QL STRIP: NEGATIVE
KETONES UR QL STRIP: NEGATIVE
LEUKOCYTE ESTERASE UR QL STRIP: NEGATIVE
NITRITE UR QL STRIP: NEGATIVE
PH UR STRIP: 5 [PH] (ref 5–8)
PROT UR QL STRIP: NEGATIVE
SP GR UR STRIP: 1.01 (ref 1–1.03)
URN SPEC COLLECT METH UR: NORMAL
UROBILINOGEN UR STRIP-ACNC: NEGATIVE EU/DL

## 2020-11-16 PROCEDURE — 82272 OCCULT BLD FECES 1-3 TESTS: CPT | Mod: 91,HCNC

## 2020-11-16 PROCEDURE — 81003 URINALYSIS AUTO W/O SCOPE: CPT | Mod: HCNC

## 2020-11-16 RX ORDER — LOSARTAN POTASSIUM 25 MG/1
25 TABLET ORAL DAILY
Qty: 90 TABLET | Refills: 1 | Status: SHIPPED | OUTPATIENT
Start: 2020-11-16 | End: 2021-02-11 | Stop reason: SDUPTHER

## 2020-11-17 LAB
OB PNL STL: NEGATIVE

## 2020-11-20 ENCOUNTER — INFUSION (OUTPATIENT)
Dept: INFUSION THERAPY | Facility: HOSPITAL | Age: 80
End: 2020-11-20
Attending: INTERNAL MEDICINE
Payer: MEDICARE

## 2020-11-20 ENCOUNTER — OFFICE VISIT (OUTPATIENT)
Dept: HEMATOLOGY/ONCOLOGY | Facility: CLINIC | Age: 80
End: 2020-11-20
Payer: MEDICARE

## 2020-11-20 VITALS
TEMPERATURE: 97 F | HEART RATE: 86 BPM | WEIGHT: 211 LBS | OXYGEN SATURATION: 98 % | HEIGHT: 70 IN | DIASTOLIC BLOOD PRESSURE: 70 MMHG | SYSTOLIC BLOOD PRESSURE: 121 MMHG | BODY MASS INDEX: 30.21 KG/M2

## 2020-11-20 VITALS
TEMPERATURE: 98 F | SYSTOLIC BLOOD PRESSURE: 114 MMHG | DIASTOLIC BLOOD PRESSURE: 62 MMHG | RESPIRATION RATE: 18 BRPM | HEART RATE: 78 BPM | OXYGEN SATURATION: 98 %

## 2020-11-20 DIAGNOSIS — C61 ADENOCARCINOMA OF PROSTATE: Primary | ICD-10-CM

## 2020-11-20 DIAGNOSIS — D50.0 IRON DEFICIENCY ANEMIA DUE TO CHRONIC BLOOD LOSS: Primary | ICD-10-CM

## 2020-11-20 PROCEDURE — 96365 THER/PROPH/DIAG IV INF INIT: CPT | Mod: HCNC

## 2020-11-20 PROCEDURE — 1101F PR PT FALLS ASSESS DOC 0-1 FALLS W/OUT INJ PAST YR: ICD-10-PCS | Mod: HCNC,CPTII,S$GLB, | Performed by: NURSE PRACTITIONER

## 2020-11-20 PROCEDURE — 99499 UNLISTED E&M SERVICE: CPT | Mod: HCNC,S$GLB,, | Performed by: NURSE PRACTITIONER

## 2020-11-20 PROCEDURE — 3288F FALL RISK ASSESSMENT DOCD: CPT | Mod: HCNC,CPTII,S$GLB, | Performed by: NURSE PRACTITIONER

## 2020-11-20 PROCEDURE — 99499 NO LOS: ICD-10-PCS | Mod: HCNC,S$GLB,, | Performed by: NURSE PRACTITIONER

## 2020-11-20 PROCEDURE — 1126F AMNT PAIN NOTED NONE PRSNT: CPT | Mod: HCNC,S$GLB,, | Performed by: NURSE PRACTITIONER

## 2020-11-20 PROCEDURE — 63600175 PHARM REV CODE 636 W HCPCS: Mod: JG,HCNC | Performed by: INTERNAL MEDICINE

## 2020-11-20 PROCEDURE — 1126F PR PAIN SEVERITY QUANTIFIED, NO PAIN PRESENT: ICD-10-PCS | Mod: HCNC,S$GLB,, | Performed by: NURSE PRACTITIONER

## 2020-11-20 PROCEDURE — 3288F PR FALLS RISK ASSESSMENT DOCUMENTED: ICD-10-PCS | Mod: HCNC,CPTII,S$GLB, | Performed by: NURSE PRACTITIONER

## 2020-11-20 PROCEDURE — 1101F PT FALLS ASSESS-DOCD LE1/YR: CPT | Mod: HCNC,CPTII,S$GLB, | Performed by: NURSE PRACTITIONER

## 2020-11-20 PROCEDURE — 25000003 PHARM REV CODE 250: Mod: HCNC | Performed by: INTERNAL MEDICINE

## 2020-11-20 RX ADMIN — FERRIC CARBOXYMALTOSE INJECTION 750 MG: 50 INJECTION, SOLUTION INTRAVENOUS at 02:11

## 2020-11-20 NOTE — DISCHARGE INSTRUCTIONS
[facility]  [address] [facility]  [address] [address]  [phone] phone     [phone] fax  Hours of Operation: Monday- Friday 8:00am- 5:00pm  After hours phone  [phone]  Hematology / Oncology Physicians on call      Dr. Portillo Virgen, NEGIN Christian NP Tyesha Taylor, NP    Please call with any concerns regarding your appointment today.FALL PREVENTION   Falls often occur due to slipping, tripping or losing your balance. Here are ways to reduce your risk of falling again.   Was there anything that caused your fall that can be fixed, removed or replaced?   Make your home safe by keeping walkways clear of objects you may trip over.   Use non-slip pads under rugs.   Do not walk in poorly lit areas.   Do not stand on chairs or wobbly ladders.   Use caution when reaching overhead or looking upward. This position can cause a loss of balance.   Be sure your shoes fit properly, have non-slip bottoms and are in good condition.   Be cautious when going up and down stairs, curbs, and when walking on uneven sidewalks.   If your balance is poor, consider using a cane or walker.   If your fall was related to alcohol use, stop or limit alcohol intake.   If your fall was related to use of sleeping medicines, talk to your doctor about this. You may need to reduce your dosage at bedtime if you awaken during the night to go to the bathroom.   To reduce the need for nighttime bathroom trips:   Avoid drinking fluids for several hours before going to bed   Empty your bladder before going to bed   Men can keep a urinal at the bedside   © 4712-8444 [facility], [address] [place] [address], [place] [address]. All rights reserved. This information is not intended as a substitute for professional medical care. Always follow your healthcare professional's instructions.    Iron-Deficiency Anemia (Adult)  Red  blood cells carry oxygen to the tissues of your body. Anemia is a condition in which you have too few red blood cells. You need iron to make red cells. Anemia makes you feel tired and run down. When anemia becomes severe, your skin becomes pale. You may feel short of breath after physical activity. Other symptoms include:  · Headaches  · Dizziness  · Leg cramps with physical activity  · Drowsiness  · Restless legs  Your anemia is caused by not having enough iron in your body. This may be because of:  · Loss of blood. This can be caused by heavy menstrual periods. It can also be caused by bleeding from the stomach or intestines.  · Poor diet. You may not be eating enough foods that contain iron.  · Inability to absorb iron from the foods you eat  · Pregnancy  If your blood count is low enough, your healthcare provider may prescribe an iron supplement. It usually takes about 2 to 3 months of treatment with iron supplements to correct anemia. Severe cases of anemia need a blood transfusion to quickly ease symptoms and deliver more oxygen to the cells.  Home care  Follow these guidelines when caring for yourself at home:  · Eat foods high in iron. This will boost the amount of iron stored in your body. It is a natural way to build up the number of blood cells. Good sources of iron include beef, liver, spinach and other dark green leafy vegetables, whole grains, beans, and nuts.  · Do not overexert yourself.  · Talk with your healthcare provider before traveling by air or traveling to high altitudes.  Follow-up care  Follow up with your healthcare provider in 2 months, or as advised. This is to have another red blood cell count to be sure your anemia has been fixed.  When to seek medical advice  Call your healthcare provider right away if any of these occur:  · Shortness of breath or chest pain  · Dizziness or fainting  · Vomiting blood or passing red or black-colored stool   Date Last Reviewed: 2/25/2016  © 8310-8517  [employer]. [address] [place] [address], [place] [address]. All rights reserved. This information is not intended as a substitute for professional medical care. Always follow your healthcare professional's instructions.

## 2020-11-20 NOTE — PLAN OF CARE
Problem: Adult Inpatient Plan of Care  Goal: Plan of Care Review  Outcome: Ongoing, Progressing  Flowsheets (Taken 11/20/2020 1454)  Plan of Care Reviewed With: patient  Goal: Patient-Specific Goal (Individualization)  Outcome: Ongoing, Progressing  Flowsheets (Taken 11/20/2020 1454)  Individualized Care Needs: none  Patient-Specific Goals (Include Timeframe): Pt like pillow and blanket   Pt reported feeling a little tired.

## 2020-11-23 RX ORDER — ATORVASTATIN CALCIUM 80 MG/1
TABLET, FILM COATED ORAL
Qty: 90 TABLET | Refills: 1 | Status: SHIPPED | OUTPATIENT
Start: 2020-11-23 | End: 2021-09-06 | Stop reason: SDUPTHER

## 2020-11-27 ENCOUNTER — TELEPHONE (OUTPATIENT)
Dept: HEMATOLOGY/ONCOLOGY | Facility: CLINIC | Age: 80
End: 2020-11-27

## 2020-11-27 NOTE — TELEPHONE ENCOUNTER
----- Message from Mae Sawant sent at 11/27/2020  3:38 PM CST -----  Contact: Jake  Patient called regarding his appointment on Thursday, Dec. 10.  He received a letter from [facility] that he was denied of the procedure; however, he would like clarity of what procedure is being requested. Please give him a call back at [phone].    Thanks,  kb    Spoke with patient who had questions about his retacrit.  I explained that his retacrit was denied because he needed to have iron in fusions first. He acknowledged. I assured him that they would retry the auth for the retacrit after his iron is done. He acknowledged. Call ended well.

## 2020-11-30 ENCOUNTER — INFUSION (OUTPATIENT)
Dept: INFUSION THERAPY | Facility: HOSPITAL | Age: 80
End: 2020-11-30
Attending: INTERNAL MEDICINE
Payer: MEDICARE

## 2020-11-30 VITALS
TEMPERATURE: 99 F | SYSTOLIC BLOOD PRESSURE: 122 MMHG | DIASTOLIC BLOOD PRESSURE: 74 MMHG | RESPIRATION RATE: 16 BRPM | OXYGEN SATURATION: 99 % | HEART RATE: 86 BPM

## 2020-11-30 DIAGNOSIS — D50.0 IRON DEFICIENCY ANEMIA DUE TO CHRONIC BLOOD LOSS: Primary | ICD-10-CM

## 2020-11-30 PROCEDURE — 63600175 PHARM REV CODE 636 W HCPCS: Mod: JG,HCNC | Performed by: INTERNAL MEDICINE

## 2020-11-30 PROCEDURE — 25000003 PHARM REV CODE 250: Mod: HCNC | Performed by: INTERNAL MEDICINE

## 2020-11-30 PROCEDURE — 96365 THER/PROPH/DIAG IV INF INIT: CPT | Mod: HCNC

## 2020-11-30 RX ADMIN — SODIUM CHLORIDE: 9 INJECTION, SOLUTION INTRAVENOUS at 01:11

## 2020-11-30 RX ADMIN — FERRIC CARBOXYMALTOSE INJECTION 750 MG: 50 INJECTION, SOLUTION INTRAVENOUS at 01:11

## 2020-11-30 NOTE — PLAN OF CARE
Problem: Adult Inpatient Plan of Care  Goal: Plan of Care Review  Outcome: Ongoing, Progressing  Flowsheets (Taken 11/30/2020 1347)  Plan of Care Reviewed With: patient  Goal: Patient-Specific Goal (Individualization)  Outcome: Ongoing, Progressing  Flowsheets (Taken 11/30/2020 1347)  Individualized Care Needs: feet elevated  Anxieties, Fears or Concerns: none  Patient-Specific Goals (Include Timeframe): to tolerate treatment today     Problem: Anemia  Goal: Anemia Symptom Improvement  Outcome: Ongoing, Progressing  Intervention: Monitor and Manage Anemia  Flowsheets (Taken 11/30/2020 1347)  Fatigue Management:   activity schedule adjusted   activity assistance provided   fatigue-related activity identified   paced activity encouraged   Patient reports no complaints at today's visit.

## 2020-12-04 ENCOUNTER — PATIENT MESSAGE (OUTPATIENT)
Dept: FAMILY MEDICINE | Facility: CLINIC | Age: 80
End: 2020-12-04

## 2020-12-07 DIAGNOSIS — G47.61 PERIODIC LIMB MOVEMENT DISORDER (PLMD): ICD-10-CM

## 2020-12-07 RX ORDER — CLONAZEPAM 1 MG/1
1 TABLET ORAL NIGHTLY
Qty: 30 TABLET | Refills: 0 | Status: SHIPPED | OUTPATIENT
Start: 2020-12-07 | End: 2021-01-05 | Stop reason: SDUPTHER

## 2020-12-08 ENCOUNTER — LAB VISIT (OUTPATIENT)
Dept: LAB | Facility: HOSPITAL | Age: 80
End: 2020-12-08
Attending: INTERNAL MEDICINE
Payer: MEDICARE

## 2020-12-08 DIAGNOSIS — N18.4 STAGE 4 CHRONIC KIDNEY DISEASE: ICD-10-CM

## 2020-12-08 DIAGNOSIS — D50.0 IRON DEFICIENCY ANEMIA DUE TO CHRONIC BLOOD LOSS: ICD-10-CM

## 2020-12-08 LAB
ALBUMIN SERPL BCP-MCNC: 4 G/DL (ref 3.5–5.2)
ALP SERPL-CCNC: 77 U/L (ref 55–135)
ALT SERPL W/O P-5'-P-CCNC: 10 U/L (ref 10–44)
ANION GAP SERPL CALC-SCNC: 7 MMOL/L (ref 8–16)
ANISOCYTOSIS BLD QL SMEAR: SLIGHT
AST SERPL-CCNC: 16 U/L (ref 10–40)
BASO STIPL BLD QL SMEAR: ABNORMAL
BASOPHILS # BLD AUTO: 0.04 K/UL (ref 0–0.2)
BASOPHILS NFR BLD: 0.7 % (ref 0–1.9)
BILIRUB SERPL-MCNC: 0.3 MG/DL (ref 0.1–1)
BUN SERPL-MCNC: 21 MG/DL (ref 8–23)
CALCIUM SERPL-MCNC: 8.5 MG/DL (ref 8.7–10.5)
CHLORIDE SERPL-SCNC: 103 MMOL/L (ref 95–110)
CO2 SERPL-SCNC: 30 MMOL/L (ref 23–29)
CREAT SERPL-MCNC: 1.6 MG/DL (ref 0.5–1.4)
DACRYOCYTES BLD QL SMEAR: ABNORMAL
DIFFERENTIAL METHOD: ABNORMAL
EOSINOPHIL # BLD AUTO: 0.2 K/UL (ref 0–0.5)
EOSINOPHIL NFR BLD: 3.3 % (ref 0–8)
ERYTHROCYTE [DISTWIDTH] IN BLOOD BY AUTOMATED COUNT: 24.2 % (ref 11.5–14.5)
EST. GFR  (AFRICAN AMERICAN): 47 ML/MIN/1.73 M^2
EST. GFR  (NON AFRICAN AMERICAN): 41 ML/MIN/1.73 M^2
GLUCOSE SERPL-MCNC: 153 MG/DL (ref 70–110)
HCT VFR BLD AUTO: 29.2 % (ref 40–54)
HGB BLD-MCNC: 8.6 G/DL (ref 14–18)
IMM GRANULOCYTES # BLD AUTO: 0.03 K/UL (ref 0–0.04)
IMM GRANULOCYTES NFR BLD AUTO: 0.5 % (ref 0–0.5)
LYMPHOCYTES # BLD AUTO: 1.3 K/UL (ref 1–4.8)
LYMPHOCYTES NFR BLD: 21.2 % (ref 18–48)
MCH RBC QN AUTO: 27.1 PG (ref 27–31)
MCHC RBC AUTO-ENTMCNC: 29.5 G/DL (ref 32–36)
MCV RBC AUTO: 92 FL (ref 82–98)
MONOCYTES # BLD AUTO: 0.6 K/UL (ref 0.3–1)
MONOCYTES NFR BLD: 9.9 % (ref 4–15)
NEUTROPHILS # BLD AUTO: 3.9 K/UL (ref 1.8–7.7)
NEUTROPHILS NFR BLD: 64.4 % (ref 38–73)
NRBC BLD-RTO: 0 /100 WBC
OVALOCYTES BLD QL SMEAR: ABNORMAL
PLATELET # BLD AUTO: 278 K/UL (ref 150–350)
PLATELET BLD QL SMEAR: ABNORMAL
PMV BLD AUTO: 9.5 FL (ref 9.2–12.9)
POIKILOCYTOSIS BLD QL SMEAR: SLIGHT
POTASSIUM SERPL-SCNC: 4.1 MMOL/L (ref 3.5–5.1)
PROT SERPL-MCNC: 7.2 G/DL (ref 6–8.4)
RBC # BLD AUTO: 3.17 M/UL (ref 4.6–6.2)
SODIUM SERPL-SCNC: 140 MMOL/L (ref 136–145)
WBC # BLD AUTO: 6.04 K/UL (ref 3.9–12.7)

## 2020-12-08 PROCEDURE — 80053 COMPREHEN METABOLIC PANEL: CPT | Mod: HCNC

## 2020-12-08 PROCEDURE — 82728 ASSAY OF FERRITIN: CPT | Mod: HCNC

## 2020-12-08 PROCEDURE — 83540 ASSAY OF IRON: CPT | Mod: HCNC

## 2020-12-08 PROCEDURE — 85025 COMPLETE CBC W/AUTO DIFF WBC: CPT | Mod: HCNC

## 2020-12-08 PROCEDURE — 36415 COLL VENOUS BLD VENIPUNCTURE: CPT | Mod: HCNC

## 2020-12-09 LAB
FERRITIN SERPL-MCNC: 683 NG/ML (ref 20–300)
IRON SERPL-MCNC: 90 UG/DL (ref 45–160)
SATURATED IRON: 29 % (ref 20–50)
TOTAL IRON BINDING CAPACITY: 311 UG/DL (ref 250–450)
TRANSFERRIN SERPL-MCNC: 210 MG/DL (ref 200–375)

## 2020-12-10 ENCOUNTER — OFFICE VISIT (OUTPATIENT)
Dept: HEMATOLOGY/ONCOLOGY | Facility: CLINIC | Age: 80
End: 2020-12-10
Payer: MEDICARE

## 2020-12-10 VITALS
DIASTOLIC BLOOD PRESSURE: 71 MMHG | SYSTOLIC BLOOD PRESSURE: 129 MMHG | WEIGHT: 216.94 LBS | BODY MASS INDEX: 31.06 KG/M2 | HEIGHT: 70 IN | OXYGEN SATURATION: 96 % | TEMPERATURE: 98 F | HEART RATE: 84 BPM

## 2020-12-10 DIAGNOSIS — C61 ADENOCARCINOMA OF PROSTATE: ICD-10-CM

## 2020-12-10 DIAGNOSIS — D63.1 ANEMIA ASSOCIATED WITH STAGE 4 CHRONIC RENAL FAILURE: ICD-10-CM

## 2020-12-10 DIAGNOSIS — D50.0 IRON DEFICIENCY ANEMIA DUE TO CHRONIC BLOOD LOSS: Primary | ICD-10-CM

## 2020-12-10 DIAGNOSIS — Z95.810 ICD (IMPLANTABLE CARDIOVERTER-DEFIBRILLATOR) IN PLACE: ICD-10-CM

## 2020-12-10 DIAGNOSIS — J43.9 MIXED RESTRICTIVE AND OBSTRUCTIVE LUNG DISEASE: Chronic | ICD-10-CM

## 2020-12-10 DIAGNOSIS — N18.4 ANEMIA ASSOCIATED WITH STAGE 4 CHRONIC RENAL FAILURE: ICD-10-CM

## 2020-12-10 DIAGNOSIS — J98.4 MIXED RESTRICTIVE AND OBSTRUCTIVE LUNG DISEASE: Chronic | ICD-10-CM

## 2020-12-10 DIAGNOSIS — I10 ESSENTIAL HYPERTENSION: ICD-10-CM

## 2020-12-10 PROCEDURE — 3288F FALL RISK ASSESSMENT DOCD: CPT | Mod: HCNC,CPTII,S$GLB, | Performed by: NURSE PRACTITIONER

## 2020-12-10 PROCEDURE — 1126F AMNT PAIN NOTED NONE PRSNT: CPT | Mod: HCNC,S$GLB,, | Performed by: NURSE PRACTITIONER

## 2020-12-10 PROCEDURE — 99999 PR PBB SHADOW E&M-EST. PATIENT-LVL IV: ICD-10-PCS | Mod: PBBFAC,HCNC,, | Performed by: NURSE PRACTITIONER

## 2020-12-10 PROCEDURE — 3078F DIAST BP <80 MM HG: CPT | Mod: HCNC,CPTII,S$GLB, | Performed by: NURSE PRACTITIONER

## 2020-12-10 PROCEDURE — 3078F PR MOST RECENT DIASTOLIC BLOOD PRESSURE < 80 MM HG: ICD-10-PCS | Mod: HCNC,CPTII,S$GLB, | Performed by: NURSE PRACTITIONER

## 2020-12-10 PROCEDURE — 1159F PR MEDICATION LIST DOCUMENTED IN MEDICAL RECORD: ICD-10-PCS | Mod: HCNC,S$GLB,, | Performed by: NURSE PRACTITIONER

## 2020-12-10 PROCEDURE — 1101F PT FALLS ASSESS-DOCD LE1/YR: CPT | Mod: HCNC,CPTII,S$GLB, | Performed by: NURSE PRACTITIONER

## 2020-12-10 PROCEDURE — 99215 PR OFFICE/OUTPT VISIT, EST, LEVL V, 40-54 MIN: ICD-10-PCS | Mod: HCNC,S$GLB,, | Performed by: NURSE PRACTITIONER

## 2020-12-10 PROCEDURE — 99215 OFFICE O/P EST HI 40 MIN: CPT | Mod: HCNC,S$GLB,, | Performed by: NURSE PRACTITIONER

## 2020-12-10 PROCEDURE — 1159F MED LIST DOCD IN RCRD: CPT | Mod: HCNC,S$GLB,, | Performed by: NURSE PRACTITIONER

## 2020-12-10 PROCEDURE — 1101F PR PT FALLS ASSESS DOC 0-1 FALLS W/OUT INJ PAST YR: ICD-10-PCS | Mod: HCNC,CPTII,S$GLB, | Performed by: NURSE PRACTITIONER

## 2020-12-10 PROCEDURE — 99499 UNLISTED E&M SERVICE: CPT | Mod: S$PBB,,, | Performed by: NURSE PRACTITIONER

## 2020-12-10 PROCEDURE — 3074F PR MOST RECENT SYSTOLIC BLOOD PRESSURE < 130 MM HG: ICD-10-PCS | Mod: HCNC,CPTII,S$GLB, | Performed by: NURSE PRACTITIONER

## 2020-12-10 PROCEDURE — 3074F SYST BP LT 130 MM HG: CPT | Mod: HCNC,CPTII,S$GLB, | Performed by: NURSE PRACTITIONER

## 2020-12-10 PROCEDURE — 99999 PR PBB SHADOW E&M-EST. PATIENT-LVL IV: CPT | Mod: PBBFAC,HCNC,, | Performed by: NURSE PRACTITIONER

## 2020-12-10 PROCEDURE — 1126F PR PAIN SEVERITY QUANTIFIED, NO PAIN PRESENT: ICD-10-PCS | Mod: HCNC,S$GLB,, | Performed by: NURSE PRACTITIONER

## 2020-12-10 PROCEDURE — 3288F PR FALLS RISK ASSESSMENT DOCUMENTED: ICD-10-PCS | Mod: HCNC,CPTII,S$GLB, | Performed by: NURSE PRACTITIONER

## 2020-12-10 PROCEDURE — 99499 RISK ADDL DX/OHS AUDIT: ICD-10-PCS | Mod: S$PBB,,, | Performed by: NURSE PRACTITIONER

## 2020-12-10 NOTE — PROGRESS NOTES
Subjective:       Patient ID: Jake Ortiz is a 80 y.o. male.    Chief Complaint: Anemia    80-year-old male, presents to review lab results for ongoing monitoring of iron deficiency anemia and anemia related to chronic kidney failure.  Patient also has history of prostate cancer that has been followed by Dr. Nath.  Most recent PSA:  <0.1.  Patient also has chronic back pain which has been managed by pain management.  Past medical history includes:  Hypertension, CABG x3, CHF, ICD placement, restrictive lung disease, psoriasis, lumbar radiculopathy, degenerative disc disease and GERD.    Today's visit:  Patient presents to review lab results and to initiate retacrit    Review of Systems   Constitutional: Positive for fatigue. Negative for activity change, appetite change, chills, diaphoresis, fever and unexpected weight change.   HENT: Negative for congestion, hearing loss, nosebleeds, postnasal drip, rhinorrhea and trouble swallowing.    Eyes: Negative for discharge and visual disturbance.   Respiratory: Negative for cough, chest tightness and shortness of breath.    Cardiovascular: Negative for chest pain, palpitations and leg swelling.   Gastrointestinal: Positive for abdominal distention. Negative for abdominal pain, constipation, diarrhea, nausea and vomiting.   Endocrine: Negative for cold intolerance and heat intolerance.   Genitourinary: Negative for difficulty urinating, dysuria, frequency and hematuria.   Musculoskeletal: Positive for arthralgias and back pain. Negative for gait problem and myalgias.   Skin: Negative.    Neurological: Positive for headaches. Negative for dizziness, weakness and light-headedness.   Hematological: Negative for adenopathy. Does not bruise/bleed easily.   Psychiatric/Behavioral: Negative for agitation, behavioral problems and confusion. The patient is not nervous/anxious.        Medication List with Changes/Refills   Current Medications    ACETAMINOPHEN (TYLENOL) 500 MG  TABLET    Take 500 mg by mouth every 6 (six) hours as needed for Pain.    APIXABAN (ELIQUIS) 5 MG TAB    Take 1 tablet (5 mg total) by mouth 2 (two) times daily.    ATORVASTATIN (LIPITOR) 80 MG TABLET    One tablet daily    BLOOD SUGAR DIAGNOSTIC STRP    Check blood glucose levels daily in the AM fasting and 1-2 times more daily.    BLOOD-GLUCOSE METER KIT    Use as instructed    CHOLECALCIFEROL, VITAMIN D3, (VITAMIN D3) 1,000 UNIT CAPSULE    Take 5,000 Units by mouth once daily.    CLONAZEPAM (KLONOPIN) 1 MG TABLET    Take 1 tablet (1 mg total) by mouth every evening.    CLOPIDOGREL (PLAVIX) 75 MG TABLET    Take 1 tablet (75 mg total) by mouth once daily.    FISH OIL-OMEGA-3 FATTY ACIDS 300-1,000 MG CAPSULE    Take 1 capsule by mouth once daily.    FLUTICASONE PROPIONATE (FLONASE) 50 MCG/ACTUATION NASAL SPRAY    USE 2 SPRAYS IN EACH NOSTRIL EVERY DAY    FUROSEMIDE (LASIX) 40 MG TABLET    Take 1 tablet (40 mg total) by mouth 2 (two) times daily.    GABAPENTIN (NEURONTIN) 300 MG CAPSULE    Take 2 capsules (600 mg total) by mouth every evening. Can take 3 caps qhs or tid if tolerated, may cause drowsiness    LANCETS MISC    Check blood glucose levels daily in the AM fasting and 1-2 times more daily.    LEVOCETIRIZINE (XYZAL) 5 MG TABLET    TAKE 1 TABLET EVERY EVENING    LOSARTAN (COZAAR) 25 MG TABLET    Take 1 tablet (25 mg total) by mouth once daily.    MULTIVITAMIN CAPSULE    Take 1 capsule by mouth once daily.    PANTOPRAZOLE (PROTONIX) 40 MG TABLET    Take 1 tablet (40 mg total) by mouth once daily.    SPIRONOLACTONE (ALDACTONE) 50 MG TABLET    Take 50 mg by mouth once daily.     Objective:     Vitals:    12/10/20 1311   BP: 129/71   Pulse: 84   Temp: 97.6 °F (36.4 °C)     Physical Exam  Vitals signs reviewed.   Constitutional:       General: He is not in acute distress.     Appearance: He is well-developed. He is not diaphoretic.   HENT:      Head: Normocephalic and atraumatic.      Right Ear: Hearing and  external ear normal.      Left Ear: Hearing and external ear normal.      Nose: Nose normal. No mucosal edema or rhinorrhea.      Mouth/Throat:      Pharynx: Uvula midline.   Eyes:      General:         Right eye: No discharge.         Left eye: No discharge.      Conjunctiva/sclera: Conjunctivae normal.      Right eye: No chemosis.     Left eye: No chemosis.     Pupils: Pupils are equal, round, and reactive to light.   Neck:      Musculoskeletal: Normal range of motion and neck supple.      Thyroid: No thyroid mass or thyromegaly.      Trachea: Trachea normal.   Cardiovascular:      Rate and Rhythm: Normal rate and regular rhythm.      Pulses:           Dorsalis pedis pulses are 2+ on the right side and 2+ on the left side.      Heart sounds: Normal heart sounds. No murmur.   Pulmonary:      Effort: Pulmonary effort is normal. No respiratory distress.      Breath sounds: Normal breath sounds. No decreased breath sounds or wheezing.   Abdominal:      General: Bowel sounds are normal. There is no distension.      Palpations: Abdomen is soft.      Tenderness: There is no abdominal tenderness.   Musculoskeletal:      Lumbar back: He exhibits decreased range of motion and tenderness.   Lymphadenopathy:      Cervical: No cervical adenopathy.      Upper Body:      Right upper body: No supraclavicular adenopathy.      Left upper body: No supraclavicular adenopathy.   Skin:     General: Skin is warm and dry.      Capillary Refill: Capillary refill takes less than 2 seconds.      Findings: No rash.   Neurological:      Mental Status: He is alert and oriented to person, place, and time.   Psychiatric:         Mood and Affect: Mood is not anxious.         Speech: Speech normal.         Behavior: Behavior normal.         Thought Content: Thought content normal.         Judgment: Judgment normal.         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)       Cardiac/Vascular    Hypertension    ICD (implantable cardioverter-defibrillator) in place       Renal/    Anemia associated with stage 4 chronic renal failure       Oncology    Adenocarcinoma of prostate    Iron deficiency anemia due to chronic blood loss - Primary          Plan:       Iron deficiency anemia:  --patient completed IV iron infusion x2  --currently iron repleted  --monitor iron levels every 3 months    Anemia secondary to CKD:  --plan to initiate retacrit 06331 units every 2 weeks for Hgb: <10  --monitor iron levels every 3 months  --will initiate treatment plan on Monday    Follow-up:  Patient scheduled to begin retacrit on Monday, repeat dose 2 weeks later.  Return to clinic 2 weeks following 2nd dose with labs prior to visit and possible retacrit

## 2020-12-10 NOTE — PATIENT INSTRUCTIONS
COVID-19 and Cancer Patients    What is COVID-19?  COVID-19 is a new type of virus that can cause mild to severe infections in the lungs. Like other viruses, it can lead to serious infections for people with weakened immune systems. COVID-19 may cause more severe infections than other viruses. We do not have a vaccine to help control its spread, but experts are working to make a vaccine.    How does COVID-19 spread?  The virus can spread easily, just like the common cold or flu. It spreads when an infected person coughs or sneezes droplets that can get into the eyes, nose, or mouth of people nearby. Droplets also land on surfaces that people touch before touching their own eyes, nose, or mouth.    How can I protect myself?  These are some of the best ways to protect yourself and others from the virus:  - Wash your hands often with soap and water for at least 20 seconds.  - Use hand  with 60% or more alcohol until you can wash your hands with soap and water.  - Avoid touching your eyes, nose, and mouth without washing your hands first.  - Clean and disinfect surfaces often. Regular household wipes and sprays will kill the virus. Be sure to  clean places that people touch a lot, such as door handles, phones, keyboards, and light switches.  - Avoid handshakes, hugging, and standing or sitting close to people who are coughing or sneezing.  - Be as healthy as you can. Get plenty of sleep, eat healthy, exercise, and manage your stress.  If you are sick, follow these steps:  - Stay home.  - Cover your nose and mouth when you cough or sneeze. If you use a tissue, put it in the garbage right  away. If you do not have a tissue, cough or sneeze into your elbow crease.  - Call before going to your medical appointments. Let them know about recent travel or if you have had  contact with a person with COVID-19.          As of 3/12/2020  Should I wear a mask?  Experts don't believe that wearing a mask is helpful for the  general public, unless there is concern you have been exposed and may not have symptoms. Some people should use certain types of masks because of their own health or the type of work they do. Talk to your doctor or nurse to see if you would benefit from wearing a mask. If you arrive at the hospital with respiratory symptoms, please ask for a mask.    What if I care for or live with a cancer patient?  If you are caring for or living with someone with cancer, do your best to keep them from getting the virus.  Follow the steps to protect yourself listed on this sheet.  If you become sick yourself, call your doctor to see what more you should do to protect your loved one.    What about people visiting?   If you are sick or have been exposed to COVID-19, we ask that you not come to [facility].    If patients have symptoms, call the [facility] Covid-19 Line ([phone])    We ask that you find someone who isn't sick to join your loved one at their appointments.  To protect all patients, we may limit the number of people who can visit someone staying in the hospital.  Please check with your care team.    How will [facility] protect me from getting COVID-19?  Our hospital and clinics are taking steps to keep infected patients separate from those who may be at risk. At every appointment, your care team will ask questions about overall health and recent travel. We may ask some patients to wait in a separate room or to reschedule until they are feeling better if they have symptoms.  We are also taking extra steps to clean and disinfect surfaces throughout our hospital and clinics.     Will you still care for me if I get sick?  Yes. Your care is our top priority. Although we may change some ways we care for you, we will never put your care or health at risk.            CALL BEFORE YOU ACT   Dial 211 or Text keyword LACOVID to [phone] for general questions and information.   If patients have  symptoms, call the [facility] Covid-19 Line ([phone])               [facility] ([email or web address])    [email or web address]

## 2020-12-11 ENCOUNTER — PATIENT MESSAGE (OUTPATIENT)
Dept: OTHER | Facility: OTHER | Age: 80
End: 2020-12-11

## 2020-12-14 ENCOUNTER — INFUSION (OUTPATIENT)
Dept: INFUSION THERAPY | Facility: HOSPITAL | Age: 80
End: 2020-12-14
Attending: FAMILY MEDICINE
Payer: MEDICARE

## 2020-12-14 VITALS
SYSTOLIC BLOOD PRESSURE: 126 MMHG | TEMPERATURE: 98 F | OXYGEN SATURATION: 98 % | RESPIRATION RATE: 18 BRPM | HEART RATE: 84 BPM | DIASTOLIC BLOOD PRESSURE: 71 MMHG

## 2020-12-14 DIAGNOSIS — D63.1 ANEMIA ASSOCIATED WITH STAGE 4 CHRONIC RENAL FAILURE: Primary | ICD-10-CM

## 2020-12-14 DIAGNOSIS — N18.4 ANEMIA ASSOCIATED WITH STAGE 4 CHRONIC RENAL FAILURE: Primary | ICD-10-CM

## 2020-12-14 PROCEDURE — 63600175 PHARM REV CODE 636 W HCPCS: Mod: HCNC | Performed by: INTERNAL MEDICINE

## 2020-12-14 PROCEDURE — 96372 THER/PROPH/DIAG INJ SC/IM: CPT | Mod: HCNC

## 2020-12-14 RX ADMIN — EPOETIN ALFA-EPBX 20000 UNITS: 10000 INJECTION, SOLUTION INTRAVENOUS; SUBCUTANEOUS at 10:12

## 2020-12-14 NOTE — NURSING
Patient arrived for retacrit injection. Patient voices no concerns at this time. Assessment and vital signs documented. Labs reviewed. Retacrit 20,000 units administered SQ in left arm as documented on MAR using aseptic technique. Patient tolerated well. Band aid applied to site. Patient states he prefers to wait in lobby for 15 mins and ambulate self out.

## 2020-12-15 ENCOUNTER — PATIENT MESSAGE (OUTPATIENT)
Dept: PAIN MEDICINE | Facility: CLINIC | Age: 80
End: 2020-12-15

## 2020-12-16 ENCOUNTER — OFFICE VISIT (OUTPATIENT)
Dept: PAIN MEDICINE | Facility: CLINIC | Age: 80
End: 2020-12-16
Payer: MEDICARE

## 2020-12-16 VITALS
HEART RATE: 84 BPM | HEIGHT: 69 IN | BODY MASS INDEX: 31.99 KG/M2 | WEIGHT: 216 LBS | SYSTOLIC BLOOD PRESSURE: 131 MMHG | DIASTOLIC BLOOD PRESSURE: 73 MMHG | RESPIRATION RATE: 18 BRPM

## 2020-12-16 DIAGNOSIS — M47.816 SPONDYLOSIS WITHOUT MYELOPATHY OR RADICULOPATHY, LUMBAR REGION: Primary | ICD-10-CM

## 2020-12-16 DIAGNOSIS — M54.16 LUMBAR RADICULOPATHY: ICD-10-CM

## 2020-12-16 DIAGNOSIS — M51.36 DDD (DEGENERATIVE DISC DISEASE), LUMBAR: ICD-10-CM

## 2020-12-16 PROCEDURE — 1159F MED LIST DOCD IN RCRD: CPT | Mod: HCNC,S$GLB,, | Performed by: PHYSICAL MEDICINE & REHABILITATION

## 2020-12-16 PROCEDURE — 3078F PR MOST RECENT DIASTOLIC BLOOD PRESSURE < 80 MM HG: ICD-10-PCS | Mod: HCNC,CPTII,S$GLB, | Performed by: PHYSICAL MEDICINE & REHABILITATION

## 2020-12-16 PROCEDURE — 1126F PR PAIN SEVERITY QUANTIFIED, NO PAIN PRESENT: ICD-10-PCS | Mod: HCNC,S$GLB,, | Performed by: PHYSICAL MEDICINE & REHABILITATION

## 2020-12-16 PROCEDURE — 99999 PR PBB SHADOW E&M-EST. PATIENT-LVL V: CPT | Mod: PBBFAC,HCNC,, | Performed by: PHYSICAL MEDICINE & REHABILITATION

## 2020-12-16 PROCEDURE — 99999 PR PBB SHADOW E&M-EST. PATIENT-LVL V: ICD-10-PCS | Mod: PBBFAC,HCNC,, | Performed by: PHYSICAL MEDICINE & REHABILITATION

## 2020-12-16 PROCEDURE — 99214 OFFICE O/P EST MOD 30 MIN: CPT | Mod: HCNC,S$GLB,, | Performed by: PHYSICAL MEDICINE & REHABILITATION

## 2020-12-16 PROCEDURE — 1126F AMNT PAIN NOTED NONE PRSNT: CPT | Mod: HCNC,S$GLB,, | Performed by: PHYSICAL MEDICINE & REHABILITATION

## 2020-12-16 PROCEDURE — 99214 PR OFFICE/OUTPT VISIT, EST, LEVL IV, 30-39 MIN: ICD-10-PCS | Mod: HCNC,S$GLB,, | Performed by: PHYSICAL MEDICINE & REHABILITATION

## 2020-12-16 PROCEDURE — 3075F SYST BP GE 130 - 139MM HG: CPT | Mod: HCNC,CPTII,S$GLB, | Performed by: PHYSICAL MEDICINE & REHABILITATION

## 2020-12-16 PROCEDURE — 3078F DIAST BP <80 MM HG: CPT | Mod: HCNC,CPTII,S$GLB, | Performed by: PHYSICAL MEDICINE & REHABILITATION

## 2020-12-16 PROCEDURE — 1159F PR MEDICATION LIST DOCUMENTED IN MEDICAL RECORD: ICD-10-PCS | Mod: HCNC,S$GLB,, | Performed by: PHYSICAL MEDICINE & REHABILITATION

## 2020-12-16 PROCEDURE — 3075F PR MOST RECENT SYSTOLIC BLOOD PRESS GE 130-139MM HG: ICD-10-PCS | Mod: HCNC,CPTII,S$GLB, | Performed by: PHYSICAL MEDICINE & REHABILITATION

## 2020-12-16 NOTE — PATIENT INSTRUCTIONS
Pain Management Pre-Procedure Instructions  (also available in your [employer] account)    Patient Name:___Jake Ramoso____MRN: [id number] you are scheduled to have the following procedure:__ Lumbar Medial Branch Block  _with______Tacho Trivedi MD on: __12/21/2020_____ at: [facility]    You will be contacted the day before your procedure to be given an arrival and procedure time                                                                                                            Day of Procedure   Ensure you have obtained arrival time from the Pain Management department  o We will call 48 hours in advance with your arrival time. Please check any voicemails you may have  o If you arrive past your scheduled procedure time, you may be asked to reschedule your procedure.   For your safety, ensure you have a  with you to remain present throughout your procedure   o If you arrive without a responsible adult to stay with you and drive you home, you may be asked to reschedule your procedure   Take all of your prescribed medications (exceptions noted below) with a small amount of water  o Stop all insulin and blood sugar medication night before and day of , take other medications as prescribed   o [x] Nothing by mouth after midnight the night before your procedure.  It is ok to take your regular medications with a small sip of water.     Wear loose, comfortable clothing    You may wear glasses, dentures, contact lenses and/or hearing aids. Please leave all valuable items at home.   Contact the Pain Management department at [phone] or via [employer] if you are:  o Running a fever above 100 degrees  o Feel ill, have any type of infection, or are taking antibiotics now or have in the past 2 weeks  o Have had any outpatient procedures in the past 2 weeks (colonoscopy, major dental work, etc.)  o If you are allergic to iodine, IVP dye or shellfish.      Contact Information: [phone], ask  to speak to the pain management department with any questions or concerns or send a message via [employer]

## 2020-12-16 NOTE — H&P (VIEW-ONLY)
Established Patient Chronic Pain Note (Follow up visit)    Chief Complaint:   Chief Complaint   Patient presents with    Low-back Pain       SUBJECTIVE:  Jake Ortiz is a 80 y.o. male who presents to the clinic for a follow-up appointment for lower back pain.  He was last seen for procedure on 10/12/2020 where he underwent bilateral L3-5 medial branch blocks.  He reported by 50% relief with the medial branch blocks.  Since the last visit, Jake Ortiz states the pain has been persistent.  Current pain intensity is 0/10 while seated and resting, but pain can increase to 10/10 with increased levels of activity.    Patient denies night fever/night sweats, urinary incontinence, bowel incontinence, significant weight loss, significant motor weakness and loss of sensations.    Pain Disability Index Review:  Last 3 PDI Scores 3/10/2020 1/31/2020   Pain Disability Index (PDI) 33 27       Interval HPI 09/30/2020:  Jake Ortiz is a 80 y.o. male who presents to the clinic for a follow-up appointment for Lower back pain.  At the last visit, Tramadol discontinue due to his ineffectiveness.  He also reports that he was not interested in long-term medication management.  A physical therapy prescription was ordered for further evaluation and treatment of his lower back pain..  He has attended a total of 8 sessions with physical therapy and reports minimal benefit..  He is interested in finding an acupuncturist as this has worked well in the past, but has not had much luck and finding a quality acupuncturist in the area.  Since the last visit, Jake Ortiz states the pain has been persistent.  Current pain intensity is 0/10 while seated and resting, but pain can increase to 10/10 with increased levels of activity.      Interval history (07/22/2020):  Jake Ortiz is a 80 y.o. male who presents to the clinic for a follow-up appointment for Lower back pain.  At the last visit, and opioid pain contract was completed in  started him on tramadol 50 mg Q 12 p.r.n. pain.  Since the last visit, Jake Ortiz states the pain has been persistent.  He reports that the tramadol did not provide significant pain relief and is not interested in medication management at this time.  Current pain intensity is 0/10.  He reports that his pain gets up to about a 5/10 with increased movement and localizes pain to the lower lumbosacral area.  He denies any radicular pain down the lower extremities.      Interval history (06/24/2020): Jake Ortiz is a 80 y.o. male who presents to the clinic for a follow-up appointment for Lower back pain. Since the last visit, Jake Ortiz states the pain has been persistant. Current pain intensity is 0/10.  He reports that his pain gets up to about a 5/10 with increased movement and localizes pain to the lower lumbosacral area.  He denies any radicular pain down the lower extremities.    Interval History (3/10/2020): Patient was seen on 2/10/20. At that time he underwent caudal XUAN.  The patient reports that he is/was unchanged following the procedure.  he reports 15% pain relief.  The changes lasted 2 days.       Interval History (1/31/2020): S/p L5/S1 IL XUAN on 1/6/2020 with limited pain relief for a few days.      Interval History (12/17/19): Patient was seen on 11/22/19. At that time he underwent L5/S1 IL XUAN.  The patient reports that he is/was unchanged following the procedure.  he reports some pain relief for a few days.  He is very active normally and would like to start dancing again.      Initial History of Present Illness:   This patient is a 79 y.o. male who presents today complaining of the above noted pain/s. The patient describes the pain as follows.  Mr. Ortiz is a new patient to clinic with complaints of back pain. Currently rates his pain 0/10 describes was seen aching sensation which is worse with bending and standing and has been somewhat improved with acupuncture.  His symptoms started many  years ago he has tried several different types of therapy including physical therapy, chiropractor, acupuncture, heating pad and ice packs with varying degrees benefit.  He has undergone lumbar surgery in the past however is unsure 1 exact was perform the surgery.  There is no evidence of fusion on x-ray in all disc appeared to be intact. He has undergone medial branch blocks and radiofrequency ablation at an outside pain clinic in April 2019 however he reports that he has received 0 benefit from these procedures.  He has been using [employer] topical cream on lumbar spine which does provide some benefit.  He finds his symptoms are worse with activity such as standing for long periods to wash dishes and cleaning house while his symptoms are completely resolved with sitting and rest.  He denies having any rib radiating symptoms and denies having bowel bladder difficulty. He has used extra-strength Tylenol in the past with no benefit.  He has completed physical therapy in the past and in performs a pretty in depth exercise routine on a daily basis.     Previous Therapy:  Medications:  Extra-strength Tylenol, Klonopin  Surgeries:  Lumbar surgery with Dr. Powers with complications with staph infection causing 2 subsequent surgeries  Injections:  - series of 3 injections (what sounds like ESIs) about 30 years ago with relief  - Lumbar Medial Branch Blocks and Lumbar Radiofrequency Ablation with limited relief  - L5/S1 IL XUAN on 11/22/19 with some pain relief for a few days  - L5/S1 IL XUAN on 1/6/2020 with limited pain relief for a few days (although noticed medication didn't spread well after looking through images with Dr. Trivedi)  - caudal XUAN on 2/10/20 with 15% pain relief x 2 days  -10/12/2020 bilateral L3-5 medial branch blocks, 50% relief       Physical Therapy:  Yes      :       Imaging:   CT Lumbar Spine Without Contrast     Narrative COMPARISON:  Lumbar spine MRI performed on 06/08/2011 and lumbar spine  radiographs on 08/08/2011  FINDINGS:  Since the prior MRI examination, development complete osseous fusion the L2 and L3 vertebral bodies.  Laminectomy/laminotomy deformities from L2 through L5.Minimal retrolisthesis at L3 with respect to L4 measuring approximately 4 mm.  At T12-L1 there is moderate degenerative disc disease with vacuum disc phenomenon.  Circumferential disc bulge with mild bilateral facet DJD that result in mild bilateral neural foraminal narrowing and mild spinal canal stenosis.  At L1-L2, there is no significant neural foraminal narrowing or spinal canal stenosis.  At L2-L3, no significant neural foraminal narrowing or spinal canal stenosis.  At L3-L4, there is moderate degenerative disc disease with vacuum disc phenomenon and central disc osteophyte complex.  Indents facet DJD contributes to moderate bilateral neural foraminal narrowing.  At L4-5, there is severe degenerative disc disease with vacuum disc phenomenon.  Advanced facet DJD contributes to moderate left and severe right neural foraminal narrowing is.  At L5-S1, there is mild bilateral neural foraminal narrowing secondary to facet DJD.  Extensive postoperative stranding throughout the posterior.  Spinal tissues.  Ascites is demonstrated in the abdomen.     Impression Multilevel postoperative and degenerative changes as discussed in detail above.  All CT scans at this facility are performed  using dose modulation techniques as appropriate to performed exam including the following:  automated exposure control; adjustment of mA and/or kV according to the patients size (this includes techniques or standardized protocols for targeted exams where dose is matched to indication/reason for exam: i.e. extremities or head);  iterative reconstruction technique.           Results for orders placed in visit on 03/03/11   MRI Lumbar Spine Without Contrast     Narrative RESULTS: THIS STUDY DEMONSTRATES SEVERE DEGENERATIVE END PLATE CHANGES AT L4-5  AND L2-3.  THERE IS A LARGE ANTERIOR OSTEOPHYTE PROJECTING OFF THE END PLATES OF L3, L2, AND L4.  THERE ARE ALSO PROMINENT DISC BULGES AT L4-5, L3-4, AND L2-3.  THIS STUDY DEMONSTRATES MULTILEVEL FACET ARTHROPATHY.    IMPRESSION: PROMINENT DEGENERATIVE DISC DISEASE AS DESCRIBED IN THE ABOVE PARAGRAPH. THIS STUDY ALSO DEMONSTRATES PROMINENT RIGHT NEURAL FORAMINAL NARROWING AT L4-5 AND LEFT NEURAL FORAMINAL NARROWING AT L4-5.            Results for orders placed during the hospital encounter of 06/08/11   MRI Lumbar Spine W WO Cont     Narrative RESULTS:  Indication:     Back pain patient's had extensive operative intervention   with laminectomy starting at L3 and extending to mid L4. There also   appears to be a right laminectomy at L4-L5. Multiple pedicle screws   apparently have been removed.  L1-L2 level unremarkable.  L2-L3 disc is narrowed there is mild spondylosis with minor facet   arthropathy present. There is some posterior paravertebral edema present   right greater than left extending from right facet lesion of L2-L3. There   is mild central stenosis present but the L2 nerve roots exit without   contact or distortion. There screw artifacts present in the pedicles at   L2 and L3-L4 and L5.  L3-L4 disc is narrowed there is mild spondylosis patient's had   laminectomy there is extensive para spinous muscular edema with fluid   along the left lamina and paraspinous region is also considerable loss of   fat plane within the paravertebral musculature of the spine from L1-L2   down to sacral region.  L4-L5 level is obliterated right laminectomy is present is extensive   edema and loss of fat planes within the paravertebral musculature and   around the posterior and lateral thecal sac.  The L5-S1 disc is intact there is facet arthropathy present.  Postcontrast study demonstrates diffuse enhancement of the  paravertebral   soft tissues starting at approximately the T12 and extending to the   sacral level.  There is  diffuse enhancement of the L2-L3 disc with enhancing tissues of   the posterior paraspinous region and minor facet joint enhancement. There   is also enhancement of the posterior epidural region resulting in some   distortion of the dorsal lateral thecal sac, left greater than right.  The L3-L4 level demonstrates minimal central and left-sided enhancement   of the disc with extensive  enhancement of the paraspinous elements with   enhancement of the posterior and lateral epidural canal. No intradural   enhancement is noted. There is some enhancement around the right residual   facet . A portion of the left facet complex is absent..  The L4-L5 level again demonstrates comparable diffuse paraspinous   enhancement and enhancement surrounding the thecal sac within the central   canal. There is enhancement of the bony elements including the pedicles   and facet complex.  L5-S1 demonstrate some  facet enhancement and posterior perithecal   enhancement  IMPRESSION:       Patient's had extensive operative intervention with removal of   pedicle screws at L2-L3 L4 and L5. There is extensive enhancement of the   operative bed in the posterior epidural region as well is some   enhancement of the scar tissue around the thecal sac. There Is no   intradural or intramedullary enhancement although there is clumping of   the cauda and enhancement of the L2-L3 and L4-L5 disc. There is also   small amount enhancement of the posterior aspect of the L3-L4 disc.   enhancement consistent with gliosis. The various levels of bone   enhancement including the facet complex at L3 and L4 as well as portions   of the pedicles involving L5 involving and adjacent to the screw tracks..           Results for orders placed during the hospital encounter of 07/11/18   CT Lumbar Spine Without Contrast     Narrative COMPARISON:  Lumbar spine MRI performed on 06/08/2011 and lumbar spine radiographs on 08/08/2011  FINDINGS:  Since the prior MRI examination,  development complete osseous fusion the L2 and L3 vertebral bodies.  Laminectomy/laminotomy deformities from L2 through L5.Minimal retrolisthesis at L3 with respect to L4 measuring approximately 4 mm.  At T12-L1 there is moderate degenerative disc disease with vacuum disc phenomenon.  Circumferential disc bulge with mild bilateral facet DJD that result in mild bilateral neural foraminal narrowing and mild spinal canal stenosis.  At L1-L2, there is no significant neural foraminal narrowing or spinal canal stenosis.  At L2-L3, no significant neural foraminal narrowing or spinal canal stenosis.  At L3-L4, there is moderate degenerative disc disease with vacuum disc phenomenon and central disc osteophyte complex.  Indents facet DJD contributes to moderate bilateral neural foraminal narrowing.  At L4-5, there is severe degenerative disc disease with vacuum disc phenomenon.  Advanced facet DJD contributes to moderate left and severe right neural foraminal narrowing is.  At L5-S1, there is mild bilateral neural foraminal narrowing secondary to facet DJD.  Extensive postoperative stranding throughout the posterior.  Spinal tissues.  Ascites is demonstrated in the abdomen.     Impression Multilevel postoperative and degenerative changes as discussed in detail above.  All CT scans at this facility are performed  using dose modulation techniques as appropriate to performed exam including the following:  automated exposure control; adjustment of mA and/or kV according to the patients size (this includes techniques or standardized protocols for targeted exams where dose is matched to indication/reason for exam: i.e. extremities or head);  iterative reconstruction technique.           Results for orders placed during the hospital encounter of 09/25/19   X-Ray Lumbar Spine Complete 5 View     Narrative COMPARISON:  05/30/2018  FINDINGS:  Scoliosis remains.  Vertebral body heights and alignment are stable.  Multilevel advanced  degenerative disc height loss and osteophyte formation noted.  Multilevel facet arthropathy present more prevalent at the lower lumbar spine.  No acute osseous abnormality appreciated.  Aorta iliac atherosclerotic vascular calcifications noted.     Impression Similar appearance of the spine.  Correlate with MRI exam as clinically indicated.           Results for orders placed during the hospital encounter of 05/30/18   X-Ray Lumbar Spine AP And Lateral     Narrative COMPARISON:  08/08/2011  FINDINGS:  Mild-to-moderate dextroscoliosis of the lumbar spine noted.  There is suggestion of possible mild loss of vertebral body height at L5 which may potentially be projectional in nature and was not definitely seen on prior.  Age-indeterminate compression fracture not excluded.  Further characterization could be obtained with MRI as clinically warranted.  Moderate disc height loss from L2-3 through L3-4 appears unchanged.  Multilevel facet arthropathy suspected throughout the lumbar spine.  Posterior elements appear grossly intact. No acute fractures or subluxations are demonstrated.  Visualized osseous structures appear diffusely osteopenic.     Impression As above.           Results for orders placed during the hospital encounter of 08/26/14   X-Ray Cervical Spine AP And Lateral     Narrative Findings: Vertebral alignment is normal.  There is narrowing of the disk spaces and  anterior osteophyte formation C 3-7.  There are no compression fractures or acute abnormalities are seen.  Carotid calcifications are noted bilaterally.     Impression  Advanced degenerative change in the cervical spine.           PMHx,PSHx, Social history, and Family history:  I have reviewed the patient's medical, surgical, social, and family history in detail and updated the computerized patient record.      Review of patient's allergies indicates:  No Known Allergies    Current Outpatient Medications   Medication Sig    acetaminophen (TYLENOL)  500 MG tablet Take 500 mg by mouth every 6 (six) hours as needed for Pain.    apixaban (ELIQUIS) 5 mg Tab Take 1 tablet (5 mg total) by mouth 2 (two) times daily.    atorvastatin (LIPITOR) 80 MG tablet One tablet daily    blood sugar diagnostic Strp Check blood glucose levels daily in the AM fasting and 1-2 times more daily.    cholecalciferol, vitamin D3, (VITAMIN D3) 1,000 unit capsule Take 5,000 Units by mouth once daily.    clonazePAM (KLONOPIN) 1 MG tablet Take 1 tablet (1 mg total) by mouth every evening.    clopidogreL (PLAVIX) 75 mg tablet Take 1 tablet (75 mg total) by mouth once daily.    fluticasone propionate (FLONASE) 50 mcg/actuation nasal spray USE 2 SPRAYS IN EACH NOSTRIL EVERY DAY    furosemide (LASIX) 40 MG tablet Take 1 tablet (40 mg total) by mouth 2 (two) times daily.    lancets Misc Check blood glucose levels daily in the AM fasting and 1-2 times more daily.    levocetirizine (XYZAL) 5 MG tablet TAKE 1 TABLET EVERY EVENING    losartan (COZAAR) 25 MG tablet Take 1 tablet (25 mg total) by mouth once daily.    multivitamin capsule Take 1 capsule by mouth once daily.    pantoprazole (PROTONIX) 40 MG tablet Take 1 tablet (40 mg total) by mouth once daily.    spironolactone (ALDACTONE) 50 MG tablet Take 50 mg by mouth once daily.    blood-glucose meter kit Use as instructed    fish oil-omega-3 fatty acids 300-1,000 mg capsule Take 1 capsule by mouth once daily.    gabapentin (NEURONTIN) 300 MG capsule Take 2 capsules (600 mg total) by mouth every evening. Can take 3 caps qhs or tid if tolerated, may cause drowsiness     No current facility-administered medications for this visit.      Facility-Administered Medications Ordered in Other Visits   Medication    ondansetron injection 4 mg    ondansetron injection 4 mg    ondansetron injection 4 mg         REVIEW OF SYSTEMS:    GENERAL:  No weight loss, malaise or fevers.  HEENT:   No recent changes in vision or hearing  NECK:  Negative  "for lumps, no difficulty with swallowing.  RESPIRATORY:  Negative for cough, wheezing or shortness of breath, patient denies any recent URI.  CARDIOVASCULAR:  Negative for chest pain, leg swelling or palpitations.  GI:  Negative for abdominal discomfort, blood in stools or black stools or change in bowel habits.  MUSCULOSKELETAL:  See HPI.  SKIN:  Negative for lesions, rash, and itching.  PSYCH:  No mood disorder or recent psychosocial stressors.  Patients sleep is not disturbed secondary to pain.  HEMATOLOGY/LYMPHOLOGY:  Negative for prolonged bleeding, bruising easily or swollen nodes.  Patient is currently taking anti-coagulants -aspirin and Eliquis  NEURO:   No history of headaches, syncope, paralysis, seizures or tremors.  All other reviewed and negative other than HPI.    OBJECTIVE:    /73 (BP Location: Right arm, Patient Position: Sitting, BP Method: Medium (Automatic))   Pulse 84   Resp 18   Ht 5' 9" (1.753 m)   Wt 98 kg (216 lb)   BMI 31.90 kg/m²     PHYSICAL EXAMINATION:    GENERAL: Well appearing, in no acute distress, alert and oriented x3.  PSYCH:  Mood and affect appropriate.  SKIN:  Mottling over the lumbar spine from excessive use of heating pad.  There are no other skin color changes, texture, turgor normal, no rashes or lesions.  HEAD/FACE:  Normocephalic, atraumatic. Cranial nerves grossly intact.  CV: RRR with palpation of the radial artery.  PULM: No evidence of respiratory difficulty, symmetric chest rise.  GI:  Soft and non-tender.  BACK:  Surgical scarring over the lumbar spine.  Straight leg raising in the sitting and supine positions is negative to radicular pain.  Mild-to-moderate pain to palpation over the facet joints of the lumbar spine and lumbar paraspinals.  Fair range of motion without pain reproduction.  Axial loading positive bilaterally.  EXTREMITIES: Peripheral joint ROM is full and pain free without obvious instability or laxity in all four extremities. No " deformities, edema, or skin discoloration. Good capillary refill.  MUSCULOSKELETAL: Shoulder, hip, and knee provocative maneuvers are negative.  There is no pain with palpation over the sacroiliac joints bilaterally.  FABERs test is negative.  FADIRs test is negative.   Bilateral upper and lower extremity strength is normal and symmetric.  No atrophy or tone abnormalities are noted.  NEURO: Bilateral upper and lower extremity coordination and muscle stretch reflexes are physiologic and symmetric.  Plantar response are downgoing. No clonus.  No loss of sensation is noted.  GAIT: normal.      LABS:  Lab Results   Component Value Date    WBC 6.04 12/08/2020    HGB 8.6 (L) 12/08/2020    HCT 29.2 (L) 12/08/2020    MCV 92 12/08/2020     12/08/2020       CMP  Sodium   Date Value Ref Range Status   12/08/2020 140 136 - 145 mmol/L Final     Potassium   Date Value Ref Range Status   12/08/2020 4.1 3.5 - 5.1 mmol/L Final     Chloride   Date Value Ref Range Status   12/08/2020 103 95 - 110 mmol/L Final     CO2   Date Value Ref Range Status   12/08/2020 30 (H) 23 - 29 mmol/L Final     Glucose   Date Value Ref Range Status   12/08/2020 153 (H) 70 - 110 mg/dL Final     BUN   Date Value Ref Range Status   12/08/2020 21 8 - 23 mg/dL Final     Creatinine   Date Value Ref Range Status   12/08/2020 1.6 (H) 0.5 - 1.4 mg/dL Final     Calcium   Date Value Ref Range Status   12/08/2020 8.5 (L) 8.7 - 10.5 mg/dL Final     Total Protein   Date Value Ref Range Status   12/08/2020 7.2 6.0 - 8.4 g/dL Final     Albumin   Date Value Ref Range Status   12/08/2020 4.0 3.5 - 5.2 g/dL Final     Total Bilirubin   Date Value Ref Range Status   12/08/2020 0.3 0.1 - 1.0 mg/dL Final     Comment:     For infants and newborns, interpretation of results should be based  on gestational age, weight and in agreement with clinical  observations.  Premature Infant recommended reference ranges:  Up to 24 hours.............<8.0 mg/dL  Up to 48  hours............<12.0 mg/dL  3-5 days..................<15.0 mg/dL  6-29 days.................<15.0 mg/dL       Alkaline Phosphatase   Date Value Ref Range Status   12/08/2020 77 55 - 135 U/L Final     AST   Date Value Ref Range Status   12/08/2020 16 10 - 40 U/L Final     ALT   Date Value Ref Range Status   12/08/2020 10 10 - 44 U/L Final     Anion Gap   Date Value Ref Range Status   12/08/2020 7 (L) 8 - 16 mmol/L Final     eGFR if    Date Value Ref Range Status   12/08/2020 47 (A) >60 mL/min/1.73 m^2 Final     eGFR if non    Date Value Ref Range Status   12/08/2020 41 (A) >60 mL/min/1.73 m^2 Final     Comment:     Calculation used to obtain the estimated glomerular filtration  rate (eGFR) is the CKD-EPI equation.          Lab Results   Component Value Date    HGBA1C 6.1 (H) 09/17/2020             ASSESSMENT: 80 y.o. year old male with  Lower back pain, consistent with     1. Spondylosis without myelopathy or radiculopathy, lumbar region  IR Facet Inj Lumbar 3rd Vert Bi    IR Facet Inj Lumbar 2nd Vert Bi    IR Facet Inj Lumbar 1st Vert Bi    Case Request-RAD/Other Procedure Area: Bilateral L3-5 MBB with steroid   2. DDD (degenerative disc disease), lumbar     3. Lumbar radiculopathy           PLAN:   - Interventions: Schedule for a diagnostic and therapeutic Medial branch block bilaterally at L3,4, and L5 with steroid to help with axial low back pain and progress with a home exercise program..  Explained procedure in detail, risks, benefits, and alternatives with the patient today and he elected to pursue this intervention at this time.  - Anticoagulation: yes, aspirin and Eliquis  - Medications: I have stressed the importance of physical activity and a home exercise plan to help with pain and improve health.  Tramadol was not effective in alleviating his pain.  NSAIDs are contraindicated due to his medical comorbidities. An opioid pain contract was completed on 06/24/2020 after  having an extensive conversation about chronic opioid use for pain management.  He is no longer interested in long-term opioid use at this time.  - Therapy:  Advised patient continue with exercises and activities as tolerated.  I expressed that a walking program would be wise to consider.  - Labs:  Reviewed  - Imaging:  Reviewed imaging available  - Consults/Referrals:  None at this time  - Records:  Reviewed/Obtain old records from outside physicians and imaging  - Follow up visit:  4 weeks post procedure  - Counseled patient regarding the importance of activity modification and physical therapy  - This condition does not require this patient to take time off of work, and the primary goal of our Pain Management services is to improve the patient's functional capacity.  - Patient Questions: Answered all of the patient's questions regarding diagnosis, therapy, and treatment    The above plan and management options were discussed at length with patient. Patient is in agreement with the above and verbalized understanding.      Tacho Trivedi MD  Interventional Pain Management  [facility]    Disclaimer:  This note was prepared using voice recognition system and is likely to have sound alike errors that may have been overlooked even after proof reading.  Please call me with any questions

## 2020-12-17 NOTE — PRE-PROCEDURE INSTRUCTIONS
Spoke with patient regarding procedure scheduled on 12/21    Arrival time 1050    Has patient been sick with fever or on antibiotics within the last 7 days? No    Does the patient have any open wounds, sores or rashes? No    Has patient received a vaccination within the last 7 days? No    Has the patient stopped all medications as directed? Na. Hold any other vitamins, supplements and other NSAIDS.    Does patient have a pacemaker and or defibrillator? pacemaker    Does the patient have a ride to and from procedure and someone reliable to remain with patient? Wife Kendal    Is the patient diabetic? yes    Does the patient have sleep apnea? Or use O2 at home? NASREEN    Is the patient receiving sedation? yes    Is the patient instructed to remain NPO beginning at midnight the night before their procedure? yes    Procedure location confirmed with patient? Yes    Covid- Denies signs/symptoms. Instructed to notify PAT/MD if any changes.

## 2020-12-21 ENCOUNTER — HOSPITAL ENCOUNTER (OUTPATIENT)
Facility: HOSPITAL | Age: 80
Discharge: HOME | End: 2020-12-21
Attending: PHYSICAL MEDICINE & REHABILITATION | Admitting: PHYSICAL MEDICINE & REHABILITATION
Payer: MEDICARE

## 2020-12-21 VITALS
BODY MASS INDEX: 31.25 KG/M2 | OXYGEN SATURATION: 98 % | DIASTOLIC BLOOD PRESSURE: 63 MMHG | HEIGHT: 69 IN | HEART RATE: 63 BPM | SYSTOLIC BLOOD PRESSURE: 136 MMHG | RESPIRATION RATE: 18 BRPM | WEIGHT: 211 LBS | TEMPERATURE: 98 F

## 2020-12-21 DIAGNOSIS — M47.816 SPONDYLOSIS WITHOUT MYELOPATHY OR RADICULOPATHY, LUMBAR REGION: ICD-10-CM

## 2020-12-21 LAB — POCT GLUCOSE: 96 MG/DL (ref 70–110)

## 2020-12-21 PROCEDURE — 64493 PR INJ DX/THER AGNT PARAVERT FACET JOINT,IMG GUIDE,LUMBAR/SAC,1ST LVL: ICD-10-PCS | Mod: 50,HCNC,, | Performed by: PHYSICAL MEDICINE & REHABILITATION

## 2020-12-21 PROCEDURE — 64493 INJ PARAVERT F JNT L/S 1 LEV: CPT | Mod: 50,HCNC | Performed by: PHYSICAL MEDICINE & REHABILITATION

## 2020-12-21 PROCEDURE — 64494 INJ PARAVERT F JNT L/S 2 LEV: CPT | Mod: 50,HCNC,, | Performed by: PHYSICAL MEDICINE & REHABILITATION

## 2020-12-21 PROCEDURE — 64494 PR INJ DX/THER AGNT PARAVERT FACET JOINT,IMG GUIDE,LUMBAR/SAC, 2ND LEVEL: ICD-10-PCS | Mod: 50,HCNC,, | Performed by: PHYSICAL MEDICINE & REHABILITATION

## 2020-12-21 PROCEDURE — 82962 GLUCOSE BLOOD TEST: CPT | Mod: HCNC | Performed by: PHYSICAL MEDICINE & REHABILITATION

## 2020-12-21 PROCEDURE — 64493 INJ PARAVERT F JNT L/S 1 LEV: CPT | Mod: 50,HCNC,, | Performed by: PHYSICAL MEDICINE & REHABILITATION

## 2020-12-21 PROCEDURE — 64494 INJ PARAVERT F JNT L/S 2 LEV: CPT | Mod: 50,HCNC | Performed by: PHYSICAL MEDICINE & REHABILITATION

## 2020-12-21 PROCEDURE — 25000003 PHARM REV CODE 250: Mod: HCNC | Performed by: PHYSICAL MEDICINE & REHABILITATION

## 2020-12-21 PROCEDURE — 63600175 PHARM REV CODE 636 W HCPCS: Mod: HCNC | Performed by: PHYSICAL MEDICINE & REHABILITATION

## 2020-12-21 RX ORDER — MIDAZOLAM HYDROCHLORIDE 1 MG/ML
INJECTION, SOLUTION INTRAMUSCULAR; INTRAVENOUS
Status: DISCONTINUED | OUTPATIENT
Start: 2020-12-21 | End: 2020-12-21 | Stop reason: HOSPADM

## 2020-12-21 RX ORDER — FENTANYL CITRATE 50 UG/ML
INJECTION, SOLUTION INTRAMUSCULAR; INTRAVENOUS
Status: DISCONTINUED | OUTPATIENT
Start: 2020-12-21 | End: 2020-12-21 | Stop reason: HOSPADM

## 2020-12-21 RX ORDER — BUPIVACAINE HYDROCHLORIDE 5 MG/ML
INJECTION, SOLUTION EPIDURAL; INTRACAUDAL
Status: DISCONTINUED | OUTPATIENT
Start: 2020-12-21 | End: 2020-12-21 | Stop reason: HOSPADM

## 2020-12-21 RX ORDER — ONDANSETRON 2 MG/ML
4 INJECTION INTRAMUSCULAR; INTRAVENOUS ONCE AS NEEDED
Status: ACTIVE | OUTPATIENT
Start: 2020-12-21 | End: 2032-05-19

## 2020-12-21 RX ORDER — METHYLPREDNISOLONE ACETATE 40 MG/ML
INJECTION, SUSPENSION INTRA-ARTICULAR; INTRALESIONAL; INTRAMUSCULAR; SOFT TISSUE
Status: DISCONTINUED | OUTPATIENT
Start: 2020-12-21 | End: 2020-12-21 | Stop reason: HOSPADM

## 2020-12-21 NOTE — DISCHARGE INSTRUCTIONS

## 2020-12-21 NOTE — DISCHARGE SUMMARY
Discharge Note  Short Stay      SUMMARY     Admit Date: 12/21/2020    Attending Physician: Tacho Trivedi MD        Discharge Physician: Tacho Trivedi MD        Discharge Date: 12/21/2020 1:25 PM    Procedure(s) (LRB):  Bilateral L3-5 MBB with steroid (Bilateral)    Final Diagnosis: Spondylosis without myelopathy or radiculopathy, lumbar region [M47.816]    Disposition: Home or self care    Patient Instructions:   Discharge Medication List as of 12/21/2020 11:03 AM      CONTINUE these medications which have NOT CHANGED    Details   acetaminophen (TYLENOL) 500 MG tablet Take 500 mg by mouth every 6 (six) hours as needed for Pain., Historical Med      apixaban (ELIQUIS) 5 mg Tab Take 1 tablet (5 mg total) by mouth 2 (two) times daily., Starting Tue 11/3/2020, Normal      atorvastatin (LIPITOR) 80 MG tablet One tablet daily, Normal      cholecalciferol, vitamin D3, (VITAMIN D3) 1,000 unit capsule Take 5,000 Units by mouth once daily., Historical Med      clonazePAM (KLONOPIN) 1 MG tablet Take 1 tablet (1 mg total) by mouth every evening., Starting Mon 12/7/2020, Until Wed 1/6/2021, Normal      clopidogreL (PLAVIX) 75 mg tablet Take 1 tablet (75 mg total) by mouth once daily., Starting Tue 11/3/2020, Normal      fish oil-omega-3 fatty acids 300-1,000 mg capsule Take 1 capsule by mouth once daily., Historical Med      fluticasone propionate (FLONASE) 50 mcg/actuation nasal spray USE 2 SPRAYS IN EACH NOSTRIL EVERY DAY, Normal      furosemide (LASIX) 40 MG tablet Take 1 tablet (40 mg total) by mouth 2 (two) times daily., Starting Thu 11/12/2020, Normal      losartan (COZAAR) 25 MG tablet Take 1 tablet (25 mg total) by mouth once daily., Starting Mon 11/16/2020, Until Tue 11/16/2021, Normal      multivitamin capsule Take 1 capsule by mouth once daily., Until Discontinued, Historical Med      pantoprazole (PROTONIX) 40 MG tablet Take 1 tablet (40 mg total) by mouth once daily., Starting Tue 11/3/2020, Until Wed  11/3/2021, Normal      spironolactone (ALDACTONE) 50 MG tablet Take 50 mg by mouth once daily., Historical Med      blood sugar diagnostic Strp Check blood glucose levels daily in the AM fasting and 1-2 times more daily., Normal      blood-glucose meter kit Use as instructed, Normal      gabapentin (NEURONTIN) 300 MG capsule Take 2 capsules (600 mg total) by mouth every evening. Can take 3 caps qhs or tid if tolerated, may cause drowsiness, Starting Thu 8/13/2020, Until Wed 11/11/2020, Normal      lancets Misc Check blood glucose levels daily in the AM fasting and 1-2 times more daily., Normal      levocetirizine (XYZAL) 5 MG tablet TAKE 1 TABLET EVERY EVENING, Normal                 Discharge Diagnosis: Spondylosis without myelopathy or radiculopathy, lumbar region [M47.816]  Condition on Discharge: Stable with no complications to procedure   Diet on Discharge: Same as before.  Activity: as per instruction sheet.  Discharge to: Home with a responsible adult.  Follow up: 2-4 weeks       Please call the office if you experience any weakness or loss of sensation, fever > 101.5, pain uncontrolled with oral medications, persistent nausea/vomiting/or diarrhea, redness or drainage from the incisions, or any other worrisome concerns. If physician on call was not reached or could not communicate with our office for any reason please go to the nearest emergency department

## 2020-12-21 NOTE — OP NOTE
LUMBAR Medial Branch Block Under Fluoroscopy  Time-out taken to identify patient and procedure side prior to starting the procedure.            12/21/2020                                                         PROCEDURE:  Bilateral medial branch block at the transverse processes at the level of L4, L5, Sacral ala    REASON FOR PROCEDURE: Spondylosis without myelopathy or radiculopathy, lumbar region [M47.816]    PHYSICIAN: Tacho Trivedi MD  ASSISTANTS: None    MEDICATIONS INJECTED: 6 cc of 0.5% bupivicane mixed with 40mg Depo-Medrol x2, 1mL at each level    LOCAL ANESTHETIC USED: Xylocaine 1% 10ml    SEDATION MEDICATIONS: None    ESTIMATED BLOOD LOSS:  None.    COMPLICATIONS:  None.    TECHNIQUE: Laying in a prone position, the patient was prepped and draped in the usual sterile fashion using ChloraPrep and fenestrated drape.  The level was determined under fluoroscopic guidance.  Local anesthetic was given by going down to the hub of the 27-gauge 1.25in needle and raising a wheel.  A 22-gauge 3.5inch needle was introduced to the anatomic local of the medial branch at each of the above levels using fluoroscopy in the AP, oblique, and lateral views.  After negative aspiration, medication was injected slowly. The patient tolerated the procedure well.       The patient was monitored after the procedure.  Patient was given post procedure and discharge instructions to follow at home.  We will see the patient back in two weeks or the patient may call to inform of status. The patient was discharged in a stable condition

## 2020-12-28 ENCOUNTER — INFUSION (OUTPATIENT)
Dept: INFUSION THERAPY | Facility: HOSPITAL | Age: 80
End: 2020-12-28
Attending: INTERNAL MEDICINE
Payer: MEDICARE

## 2020-12-28 VITALS
DIASTOLIC BLOOD PRESSURE: 85 MMHG | HEART RATE: 87 BPM | TEMPERATURE: 98 F | RESPIRATION RATE: 18 BRPM | SYSTOLIC BLOOD PRESSURE: 141 MMHG | OXYGEN SATURATION: 98 %

## 2020-12-28 DIAGNOSIS — N18.4 ANEMIA ASSOCIATED WITH STAGE 4 CHRONIC RENAL FAILURE: Primary | ICD-10-CM

## 2020-12-28 DIAGNOSIS — D63.1 ANEMIA ASSOCIATED WITH STAGE 4 CHRONIC RENAL FAILURE: Primary | ICD-10-CM

## 2020-12-28 PROCEDURE — 63600175 PHARM REV CODE 636 W HCPCS: Mod: HCNC,EC | Performed by: INTERNAL MEDICINE

## 2020-12-28 PROCEDURE — 96372 THER/PROPH/DIAG INJ SC/IM: CPT | Mod: HCNC

## 2020-12-28 RX ADMIN — EPOETIN ALFA-EPBX 20000 UNITS: 10000 INJECTION, SOLUTION INTRAVENOUS; SUBCUTANEOUS at 10:12

## 2021-01-04 ENCOUNTER — PATIENT MESSAGE (OUTPATIENT)
Dept: FAMILY MEDICINE | Facility: CLINIC | Age: 81
End: 2021-01-04

## 2021-01-04 DIAGNOSIS — G47.61 PERIODIC LIMB MOVEMENT DISORDER (PLMD): ICD-10-CM

## 2021-01-04 RX ORDER — CLONAZEPAM 1 MG/1
1 TABLET ORAL NIGHTLY
Qty: 30 TABLET | Refills: 0 | Status: CANCELLED | OUTPATIENT
Start: 2021-01-04 | End: 2021-02-03

## 2021-01-05 ENCOUNTER — CLINICAL SUPPORT (OUTPATIENT)
Dept: CARDIOLOGY | Facility: HOSPITAL | Age: 81
End: 2021-01-05
Payer: MEDICARE

## 2021-01-05 ENCOUNTER — IMMUNIZATION (OUTPATIENT)
Dept: INTERNAL MEDICINE | Facility: CLINIC | Age: 81
End: 2021-01-05
Payer: MEDICARE

## 2021-01-05 ENCOUNTER — OFFICE VISIT (OUTPATIENT)
Dept: FAMILY MEDICINE | Facility: CLINIC | Age: 81
End: 2021-01-05
Payer: MEDICARE

## 2021-01-05 VITALS
TEMPERATURE: 98 F | BODY MASS INDEX: 31.47 KG/M2 | SYSTOLIC BLOOD PRESSURE: 120 MMHG | RESPIRATION RATE: 16 BRPM | WEIGHT: 212.5 LBS | OXYGEN SATURATION: 96 % | HEART RATE: 89 BPM | HEIGHT: 69 IN | DIASTOLIC BLOOD PRESSURE: 80 MMHG

## 2021-01-05 DIAGNOSIS — N18.4 STAGE 4 CHRONIC KIDNEY DISEASE: ICD-10-CM

## 2021-01-05 DIAGNOSIS — G47.61 PERIODIC LIMB MOVEMENT DISORDER (PLMD): Primary | ICD-10-CM

## 2021-01-05 DIAGNOSIS — N18.4 ANEMIA ASSOCIATED WITH STAGE 4 CHRONIC RENAL FAILURE: ICD-10-CM

## 2021-01-05 DIAGNOSIS — J43.9 MIXED RESTRICTIVE AND OBSTRUCTIVE LUNG DISEASE: ICD-10-CM

## 2021-01-05 DIAGNOSIS — E11.69 DIABETES MELLITUS TYPE 2 IN OBESE: ICD-10-CM

## 2021-01-05 DIAGNOSIS — Z23 NEED FOR VACCINATION: ICD-10-CM

## 2021-01-05 DIAGNOSIS — E66.9 DIABETES MELLITUS TYPE 2 IN OBESE: ICD-10-CM

## 2021-01-05 DIAGNOSIS — D63.1 ANEMIA ASSOCIATED WITH STAGE 4 CHRONIC RENAL FAILURE: ICD-10-CM

## 2021-01-05 DIAGNOSIS — E78.2 MIXED HYPERLIPIDEMIA: ICD-10-CM

## 2021-01-05 DIAGNOSIS — I48.0 PAROXYSMAL ATRIAL FIBRILLATION: ICD-10-CM

## 2021-01-05 DIAGNOSIS — Z95.810 ICD (IMPLANTABLE CARDIOVERTER-DEFIBRILLATOR) IN PLACE: ICD-10-CM

## 2021-01-05 DIAGNOSIS — M54.16 LUMBAR RADICULOPATHY: ICD-10-CM

## 2021-01-05 DIAGNOSIS — I10 ESSENTIAL HYPERTENSION: ICD-10-CM

## 2021-01-05 DIAGNOSIS — J98.4 MIXED RESTRICTIVE AND OBSTRUCTIVE LUNG DISEASE: ICD-10-CM

## 2021-01-05 DIAGNOSIS — I25.10 CORONARY ARTERY DISEASE INVOLVING NATIVE CORONARY ARTERY OF NATIVE HEART WITHOUT ANGINA PECTORIS: ICD-10-CM

## 2021-01-05 DIAGNOSIS — I50.9 CONGESTIVE HEART FAILURE, UNSPECIFIED HF CHRONICITY, UNSPECIFIED HEART FAILURE TYPE: ICD-10-CM

## 2021-01-05 DIAGNOSIS — G47.33 OBSTRUCTIVE SLEEP APNEA: ICD-10-CM

## 2021-01-05 DIAGNOSIS — C61 ADENOCARCINOMA OF PROSTATE: ICD-10-CM

## 2021-01-05 DIAGNOSIS — Z95.0 PRESENCE OF CARDIAC PACEMAKER: ICD-10-CM

## 2021-01-05 PROCEDURE — 91300 COVID-19, MRNA, LNP-S, PF, 30 MCG/0.3 ML DOSE VACCINE: CPT | Mod: PBBFAC | Performed by: FAMILY MEDICINE

## 2021-01-05 PROCEDURE — 3288F PR FALLS RISK ASSESSMENT DOCUMENTED: ICD-10-PCS | Mod: HCNC,CPTII,S$GLB, | Performed by: FAMILY MEDICINE

## 2021-01-05 PROCEDURE — 99999 PR PBB SHADOW E&M-EST. PATIENT-LVL IV: CPT | Mod: PBBFAC,HCNC,, | Performed by: FAMILY MEDICINE

## 2021-01-05 PROCEDURE — 1159F MED LIST DOCD IN RCRD: CPT | Mod: HCNC,S$GLB,, | Performed by: FAMILY MEDICINE

## 2021-01-05 PROCEDURE — 93294 REM INTERROG EVL PM/LDLS PM: CPT | Mod: ,,, | Performed by: INTERNAL MEDICINE

## 2021-01-05 PROCEDURE — 1101F PR PT FALLS ASSESS DOC 0-1 FALLS W/OUT INJ PAST YR: ICD-10-PCS | Mod: HCNC,CPTII,S$GLB, | Performed by: FAMILY MEDICINE

## 2021-01-05 PROCEDURE — 1126F PR PAIN SEVERITY QUANTIFIED, NO PAIN PRESENT: ICD-10-PCS | Mod: HCNC,S$GLB,, | Performed by: FAMILY MEDICINE

## 2021-01-05 PROCEDURE — 99214 PR OFFICE/OUTPT VISIT, EST, LEVL IV, 30-39 MIN: ICD-10-PCS | Mod: HCNC,S$GLB,, | Performed by: FAMILY MEDICINE

## 2021-01-05 PROCEDURE — 99499 RISK ADDL DX/OHS AUDIT: ICD-10-PCS | Mod: HCNC,S$GLB,, | Performed by: FAMILY MEDICINE

## 2021-01-05 PROCEDURE — 3074F PR MOST RECENT SYSTOLIC BLOOD PRESSURE < 130 MM HG: ICD-10-PCS | Mod: HCNC,CPTII,S$GLB, | Performed by: FAMILY MEDICINE

## 2021-01-05 PROCEDURE — 1159F PR MEDICATION LIST DOCUMENTED IN MEDICAL RECORD: ICD-10-PCS | Mod: HCNC,S$GLB,, | Performed by: FAMILY MEDICINE

## 2021-01-05 PROCEDURE — 1101F PT FALLS ASSESS-DOCD LE1/YR: CPT | Mod: HCNC,CPTII,S$GLB, | Performed by: FAMILY MEDICINE

## 2021-01-05 PROCEDURE — 99499 UNLISTED E&M SERVICE: CPT | Mod: HCNC,S$GLB,, | Performed by: FAMILY MEDICINE

## 2021-01-05 PROCEDURE — 1126F AMNT PAIN NOTED NONE PRSNT: CPT | Mod: HCNC,S$GLB,, | Performed by: FAMILY MEDICINE

## 2021-01-05 PROCEDURE — 99214 OFFICE O/P EST MOD 30 MIN: CPT | Mod: HCNC,S$GLB,, | Performed by: FAMILY MEDICINE

## 2021-01-05 PROCEDURE — 3079F PR MOST RECENT DIASTOLIC BLOOD PRESSURE 80-89 MM HG: ICD-10-PCS | Mod: HCNC,CPTII,S$GLB, | Performed by: FAMILY MEDICINE

## 2021-01-05 PROCEDURE — 93296 REM INTERROG EVL PM/IDS: CPT | Mod: PBBFAC,HCNC | Performed by: INTERNAL MEDICINE

## 2021-01-05 PROCEDURE — 3074F SYST BP LT 130 MM HG: CPT | Mod: HCNC,CPTII,S$GLB, | Performed by: FAMILY MEDICINE

## 2021-01-05 PROCEDURE — 93294 CARDIAC DEVICE CHECK - REMOTE: ICD-10-PCS | Mod: ,,, | Performed by: INTERNAL MEDICINE

## 2021-01-05 PROCEDURE — 3288F FALL RISK ASSESSMENT DOCD: CPT | Mod: HCNC,CPTII,S$GLB, | Performed by: FAMILY MEDICINE

## 2021-01-05 PROCEDURE — 99999 PR PBB SHADOW E&M-EST. PATIENT-LVL IV: ICD-10-PCS | Mod: PBBFAC,HCNC,, | Performed by: FAMILY MEDICINE

## 2021-01-05 PROCEDURE — 3079F DIAST BP 80-89 MM HG: CPT | Mod: HCNC,CPTII,S$GLB, | Performed by: FAMILY MEDICINE

## 2021-01-05 RX ORDER — CLONAZEPAM 1 MG/1
1 TABLET ORAL NIGHTLY
Qty: 90 TABLET | Refills: 0 | Status: SHIPPED | OUTPATIENT
Start: 2021-01-05 | End: 2021-04-12 | Stop reason: SDUPTHER

## 2021-01-08 ENCOUNTER — LAB VISIT (OUTPATIENT)
Dept: LAB | Facility: HOSPITAL | Age: 81
End: 2021-01-08
Attending: NURSE PRACTITIONER
Payer: MEDICARE

## 2021-01-08 DIAGNOSIS — D63.1 ANEMIA ASSOCIATED WITH STAGE 4 CHRONIC RENAL FAILURE: ICD-10-CM

## 2021-01-08 DIAGNOSIS — D50.0 IRON DEFICIENCY ANEMIA DUE TO CHRONIC BLOOD LOSS: ICD-10-CM

## 2021-01-08 DIAGNOSIS — N18.4 ANEMIA ASSOCIATED WITH STAGE 4 CHRONIC RENAL FAILURE: ICD-10-CM

## 2021-01-08 LAB
BASOPHILS # BLD AUTO: 0.06 K/UL (ref 0–0.2)
BASOPHILS NFR BLD: 1.3 % (ref 0–1.9)
DIFFERENTIAL METHOD: ABNORMAL
EOSINOPHIL # BLD AUTO: 0.2 K/UL (ref 0–0.5)
EOSINOPHIL NFR BLD: 3.3 % (ref 0–8)
ERYTHROCYTE [DISTWIDTH] IN BLOOD BY AUTOMATED COUNT: 23.5 % (ref 11.5–14.5)
HCT VFR BLD AUTO: 38.2 % (ref 40–54)
HGB BLD-MCNC: 11.9 G/DL (ref 14–18)
IMM GRANULOCYTES # BLD AUTO: 0.01 K/UL (ref 0–0.04)
IMM GRANULOCYTES NFR BLD AUTO: 0.2 % (ref 0–0.5)
LYMPHOCYTES # BLD AUTO: 1 K/UL (ref 1–4.8)
LYMPHOCYTES NFR BLD: 21.8 % (ref 18–48)
MCH RBC QN AUTO: 30.1 PG (ref 27–31)
MCHC RBC AUTO-ENTMCNC: 31.2 G/DL (ref 32–36)
MCV RBC AUTO: 97 FL (ref 82–98)
MONOCYTES # BLD AUTO: 0.5 K/UL (ref 0.3–1)
MONOCYTES NFR BLD: 10.5 % (ref 4–15)
NEUTROPHILS # BLD AUTO: 3 K/UL (ref 1.8–7.7)
NEUTROPHILS NFR BLD: 62.9 % (ref 38–73)
NRBC BLD-RTO: 0 /100 WBC
PLATELET # BLD AUTO: 278 K/UL (ref 150–350)
PMV BLD AUTO: 9.8 FL (ref 9.2–12.9)
RBC # BLD AUTO: 3.95 M/UL (ref 4.6–6.2)
WBC # BLD AUTO: 4.78 K/UL (ref 3.9–12.7)

## 2021-01-08 PROCEDURE — 80053 COMPREHEN METABOLIC PANEL: CPT | Mod: HCNC

## 2021-01-08 PROCEDURE — 85025 COMPLETE CBC W/AUTO DIFF WBC: CPT | Mod: HCNC

## 2021-01-09 LAB
ALBUMIN SERPL BCP-MCNC: 4.3 G/DL (ref 3.5–5.2)
ALP SERPL-CCNC: 84 U/L (ref 55–135)
ALT SERPL W/O P-5'-P-CCNC: 16 U/L (ref 10–44)
ANION GAP SERPL CALC-SCNC: 14 MMOL/L (ref 8–16)
AST SERPL-CCNC: 21 U/L (ref 10–40)
BILIRUB SERPL-MCNC: 0.5 MG/DL (ref 0.1–1)
BUN SERPL-MCNC: 26 MG/DL (ref 8–23)
CALCIUM SERPL-MCNC: 9.6 MG/DL (ref 8.7–10.5)
CHLORIDE SERPL-SCNC: 101 MMOL/L (ref 95–110)
CO2 SERPL-SCNC: 25 MMOL/L (ref 23–29)
CREAT SERPL-MCNC: 1.6 MG/DL (ref 0.5–1.4)
EST. GFR  (AFRICAN AMERICAN): 47 ML/MIN/1.73 M^2
EST. GFR  (NON AFRICAN AMERICAN): 40.7 ML/MIN/1.73 M^2
GLUCOSE SERPL-MCNC: 169 MG/DL (ref 70–110)
POTASSIUM SERPL-SCNC: 3.5 MMOL/L (ref 3.5–5.1)
PROT SERPL-MCNC: 7.9 G/DL (ref 6–8.4)
SODIUM SERPL-SCNC: 140 MMOL/L (ref 136–145)

## 2021-01-11 DIAGNOSIS — D63.1 ANEMIA ASSOCIATED WITH STAGE 4 CHRONIC RENAL FAILURE: Primary | ICD-10-CM

## 2021-01-11 DIAGNOSIS — N18.4 ANEMIA ASSOCIATED WITH STAGE 4 CHRONIC RENAL FAILURE: Primary | ICD-10-CM

## 2021-01-20 ENCOUNTER — PATIENT MESSAGE (OUTPATIENT)
Dept: NEPHROLOGY | Facility: CLINIC | Age: 81
End: 2021-01-20

## 2021-01-22 ENCOUNTER — LAB VISIT (OUTPATIENT)
Dept: LAB | Facility: HOSPITAL | Age: 81
End: 2021-01-22
Attending: INTERNAL MEDICINE
Payer: MEDICARE

## 2021-01-22 DIAGNOSIS — D63.1 ANEMIA ASSOCIATED WITH STAGE 4 CHRONIC RENAL FAILURE: ICD-10-CM

## 2021-01-22 DIAGNOSIS — N18.4 STAGE 4 CHRONIC KIDNEY DISEASE: ICD-10-CM

## 2021-01-22 DIAGNOSIS — N18.4 ANEMIA ASSOCIATED WITH STAGE 4 CHRONIC RENAL FAILURE: ICD-10-CM

## 2021-01-22 LAB
ALBUMIN SERPL BCP-MCNC: 4.3 G/DL (ref 3.5–5.2)
ANION GAP SERPL CALC-SCNC: 13 MMOL/L (ref 8–16)
BASOPHILS # BLD AUTO: 0.04 K/UL (ref 0–0.2)
BASOPHILS NFR BLD: 1 % (ref 0–1.9)
BUN SERPL-MCNC: 21 MG/DL (ref 8–23)
CALCIUM SERPL-MCNC: 9.4 MG/DL (ref 8.7–10.5)
CHLORIDE SERPL-SCNC: 104 MMOL/L (ref 95–110)
CO2 SERPL-SCNC: 26 MMOL/L (ref 23–29)
CREAT SERPL-MCNC: 1.6 MG/DL (ref 0.5–1.4)
DIFFERENTIAL METHOD: ABNORMAL
EOSINOPHIL # BLD AUTO: 0.2 K/UL (ref 0–0.5)
EOSINOPHIL NFR BLD: 3.8 % (ref 0–8)
ERYTHROCYTE [DISTWIDTH] IN BLOOD BY AUTOMATED COUNT: 20.7 % (ref 11.5–14.5)
EST. GFR  (AFRICAN AMERICAN): 47 ML/MIN/1.73 M^2
EST. GFR  (NON AFRICAN AMERICAN): 41 ML/MIN/1.73 M^2
GLUCOSE SERPL-MCNC: 137 MG/DL (ref 70–110)
HCT VFR BLD AUTO: 37.1 % (ref 40–54)
HGB BLD-MCNC: 11.7 G/DL (ref 14–18)
IMM GRANULOCYTES # BLD AUTO: 0.02 K/UL (ref 0–0.04)
IMM GRANULOCYTES NFR BLD AUTO: 0.5 % (ref 0–0.5)
LYMPHOCYTES # BLD AUTO: 0.9 K/UL (ref 1–4.8)
LYMPHOCYTES NFR BLD: 21 % (ref 18–48)
MCH RBC QN AUTO: 30.4 PG (ref 27–31)
MCHC RBC AUTO-ENTMCNC: 31.5 G/DL (ref 32–36)
MCV RBC AUTO: 96 FL (ref 82–98)
MONOCYTES # BLD AUTO: 0.4 K/UL (ref 0.3–1)
MONOCYTES NFR BLD: 10.3 % (ref 4–15)
NEUTROPHILS # BLD AUTO: 2.7 K/UL (ref 1.8–7.7)
NEUTROPHILS NFR BLD: 63.4 % (ref 38–73)
NRBC BLD-RTO: 0 /100 WBC
PHOSPHATE SERPL-MCNC: 2.5 MG/DL (ref 2.7–4.5)
PLATELET # BLD AUTO: 262 K/UL (ref 150–350)
PMV BLD AUTO: 10 FL (ref 9.2–12.9)
POTASSIUM SERPL-SCNC: 3.6 MMOL/L (ref 3.5–5.1)
PTH-INTACT SERPL-MCNC: 80.7 PG/ML (ref 9–77)
RBC # BLD AUTO: 3.85 M/UL (ref 4.6–6.2)
SODIUM SERPL-SCNC: 143 MMOL/L (ref 136–145)
WBC # BLD AUTO: 4.19 K/UL (ref 3.9–12.7)

## 2021-01-22 PROCEDURE — 85025 COMPLETE CBC W/AUTO DIFF WBC: CPT | Mod: HCNC

## 2021-01-22 PROCEDURE — 83540 ASSAY OF IRON: CPT | Mod: HCNC

## 2021-01-22 PROCEDURE — 36415 COLL VENOUS BLD VENIPUNCTURE: CPT | Mod: HCNC,PO

## 2021-01-22 PROCEDURE — 80069 RENAL FUNCTION PANEL: CPT | Mod: HCNC

## 2021-01-22 PROCEDURE — 83970 ASSAY OF PARATHORMONE: CPT | Mod: HCNC

## 2021-01-23 LAB
IRON SERPL-MCNC: 65 UG/DL (ref 45–160)
SATURATED IRON: 20 % (ref 20–50)
TOTAL IRON BINDING CAPACITY: 321 UG/DL (ref 250–450)
TRANSFERRIN SERPL-MCNC: 217 MG/DL (ref 200–375)

## 2021-01-26 ENCOUNTER — IMMUNIZATION (OUTPATIENT)
Dept: INTERNAL MEDICINE | Facility: CLINIC | Age: 81
End: 2021-01-26
Payer: MEDICARE

## 2021-01-26 DIAGNOSIS — Z23 NEED FOR VACCINATION: Primary | ICD-10-CM

## 2021-01-26 PROCEDURE — 91300 COVID-19, MRNA, LNP-S, PF, 30 MCG/0.3 ML DOSE VACCINE: CPT | Mod: PBBFAC | Performed by: FAMILY MEDICINE

## 2021-01-26 PROCEDURE — 0002A COVID-19, MRNA, LNP-S, PF, 30 MCG/0.3 ML DOSE VACCINE: CPT | Mod: PBBFAC | Performed by: FAMILY MEDICINE

## 2021-02-05 ENCOUNTER — OFFICE VISIT (OUTPATIENT)
Dept: NEPHROLOGY | Facility: CLINIC | Age: 81
End: 2021-02-05
Payer: MEDICARE

## 2021-02-05 ENCOUNTER — PATIENT MESSAGE (OUTPATIENT)
Dept: HEMATOLOGY/ONCOLOGY | Facility: CLINIC | Age: 81
End: 2021-02-05

## 2021-02-05 ENCOUNTER — PATIENT MESSAGE (OUTPATIENT)
Dept: NEPHROLOGY | Facility: CLINIC | Age: 81
End: 2021-02-05

## 2021-02-05 VITALS
DIASTOLIC BLOOD PRESSURE: 78 MMHG | RESPIRATION RATE: 20 BRPM | HEIGHT: 69 IN | WEIGHT: 213.38 LBS | SYSTOLIC BLOOD PRESSURE: 132 MMHG | HEART RATE: 64 BPM | BODY MASS INDEX: 31.6 KG/M2

## 2021-02-05 DIAGNOSIS — N25.81 SECONDARY HYPERPARATHYROIDISM OF RENAL ORIGIN: ICD-10-CM

## 2021-02-05 DIAGNOSIS — N18.32 STAGE 3B CHRONIC KIDNEY DISEASE: Primary | ICD-10-CM

## 2021-02-05 DIAGNOSIS — I10 HTN (HYPERTENSION), BENIGN: ICD-10-CM

## 2021-02-05 DIAGNOSIS — N18.4 STAGE 4 CHRONIC KIDNEY DISEASE: ICD-10-CM

## 2021-02-05 PROCEDURE — 3078F PR MOST RECENT DIASTOLIC BLOOD PRESSURE < 80 MM HG: ICD-10-PCS | Mod: CPTII,S$GLB,, | Performed by: INTERNAL MEDICINE

## 2021-02-05 PROCEDURE — 1101F PT FALLS ASSESS-DOCD LE1/YR: CPT | Mod: CPTII,S$GLB,, | Performed by: INTERNAL MEDICINE

## 2021-02-05 PROCEDURE — 99204 PR OFFICE/OUTPT VISIT, NEW, LEVL IV, 45-59 MIN: ICD-10-PCS | Mod: S$GLB,,, | Performed by: INTERNAL MEDICINE

## 2021-02-05 PROCEDURE — 3078F DIAST BP <80 MM HG: CPT | Mod: CPTII,S$GLB,, | Performed by: INTERNAL MEDICINE

## 2021-02-05 PROCEDURE — 1159F PR MEDICATION LIST DOCUMENTED IN MEDICAL RECORD: ICD-10-PCS | Mod: S$GLB,,, | Performed by: INTERNAL MEDICINE

## 2021-02-05 PROCEDURE — 99204 OFFICE O/P NEW MOD 45 MIN: CPT | Mod: S$GLB,,, | Performed by: INTERNAL MEDICINE

## 2021-02-05 PROCEDURE — 3288F PR FALLS RISK ASSESSMENT DOCUMENTED: ICD-10-PCS | Mod: CPTII,S$GLB,, | Performed by: INTERNAL MEDICINE

## 2021-02-05 PROCEDURE — 3075F SYST BP GE 130 - 139MM HG: CPT | Mod: CPTII,S$GLB,, | Performed by: INTERNAL MEDICINE

## 2021-02-05 PROCEDURE — 3075F PR MOST RECENT SYSTOLIC BLOOD PRESS GE 130-139MM HG: ICD-10-PCS | Mod: CPTII,S$GLB,, | Performed by: INTERNAL MEDICINE

## 2021-02-05 PROCEDURE — 99999 PR PBB SHADOW E&M-EST. PATIENT-LVL V: CPT | Mod: PBBFAC,,, | Performed by: INTERNAL MEDICINE

## 2021-02-05 PROCEDURE — 1101F PR PT FALLS ASSESS DOC 0-1 FALLS W/OUT INJ PAST YR: ICD-10-PCS | Mod: CPTII,S$GLB,, | Performed by: INTERNAL MEDICINE

## 2021-02-05 PROCEDURE — 99999 PR PBB SHADOW E&M-EST. PATIENT-LVL V: ICD-10-PCS | Mod: PBBFAC,,, | Performed by: INTERNAL MEDICINE

## 2021-02-05 PROCEDURE — 3288F FALL RISK ASSESSMENT DOCD: CPT | Mod: CPTII,S$GLB,, | Performed by: INTERNAL MEDICINE

## 2021-02-05 PROCEDURE — 1126F AMNT PAIN NOTED NONE PRSNT: CPT | Mod: S$GLB,,, | Performed by: INTERNAL MEDICINE

## 2021-02-05 PROCEDURE — 1159F MED LIST DOCD IN RCRD: CPT | Mod: S$GLB,,, | Performed by: INTERNAL MEDICINE

## 2021-02-05 PROCEDURE — 1126F PR PAIN SEVERITY QUANTIFIED, NO PAIN PRESENT: ICD-10-PCS | Mod: S$GLB,,, | Performed by: INTERNAL MEDICINE

## 2021-02-08 ENCOUNTER — PATIENT MESSAGE (OUTPATIENT)
Dept: HEMATOLOGY/ONCOLOGY | Facility: CLINIC | Age: 81
End: 2021-02-08

## 2021-02-09 ENCOUNTER — TELEPHONE (OUTPATIENT)
Dept: RADIOLOGY | Facility: HOSPITAL | Age: 81
End: 2021-02-09

## 2021-02-11 ENCOUNTER — OFFICE VISIT (OUTPATIENT)
Dept: CARDIOLOGY | Facility: CLINIC | Age: 81
End: 2021-02-11
Payer: MEDICARE

## 2021-02-11 ENCOUNTER — HOSPITAL ENCOUNTER (OUTPATIENT)
Dept: RADIOLOGY | Facility: HOSPITAL | Age: 81
Discharge: HOME | End: 2021-02-11
Attending: INTERNAL MEDICINE
Payer: MEDICARE

## 2021-02-11 VITALS
DIASTOLIC BLOOD PRESSURE: 62 MMHG | WEIGHT: 210.31 LBS | HEIGHT: 69 IN | HEART RATE: 82 BPM | RESPIRATION RATE: 16 BRPM | OXYGEN SATURATION: 97 % | SYSTOLIC BLOOD PRESSURE: 120 MMHG | BODY MASS INDEX: 31.15 KG/M2

## 2021-02-11 DIAGNOSIS — Z95.810 ICD (IMPLANTABLE CARDIOVERTER-DEFIBRILLATOR) IN PLACE: ICD-10-CM

## 2021-02-11 DIAGNOSIS — I48.0 PAROXYSMAL ATRIAL FIBRILLATION: ICD-10-CM

## 2021-02-11 DIAGNOSIS — I10 ESSENTIAL HYPERTENSION: ICD-10-CM

## 2021-02-11 DIAGNOSIS — D50.0 IRON DEFICIENCY ANEMIA DUE TO CHRONIC BLOOD LOSS: ICD-10-CM

## 2021-02-11 DIAGNOSIS — N18.4 STAGE 4 CHRONIC KIDNEY DISEASE: ICD-10-CM

## 2021-02-11 DIAGNOSIS — I50.42 CHRONIC COMBINED SYSTOLIC AND DIASTOLIC CONGESTIVE HEART FAILURE: ICD-10-CM

## 2021-02-11 DIAGNOSIS — I25.118 CORONARY ARTERY DISEASE OF NATIVE ARTERY OF NATIVE HEART WITH STABLE ANGINA PECTORIS: Primary | ICD-10-CM

## 2021-02-11 DIAGNOSIS — E78.2 MIXED HYPERLIPIDEMIA: ICD-10-CM

## 2021-02-11 DIAGNOSIS — I25.2 MI, OLD: ICD-10-CM

## 2021-02-11 DIAGNOSIS — Z95.1 S/P CABG X 3: ICD-10-CM

## 2021-02-11 PROCEDURE — 76770 US EXAM ABDO BACK WALL COMP: CPT | Mod: TC

## 2021-02-11 PROCEDURE — 3074F PR MOST RECENT SYSTOLIC BLOOD PRESSURE < 130 MM HG: ICD-10-PCS | Mod: CPTII,S$GLB,, | Performed by: INTERNAL MEDICINE

## 2021-02-11 PROCEDURE — 99499 UNLISTED E&M SERVICE: CPT | Mod: S$GLB,,, | Performed by: INTERNAL MEDICINE

## 2021-02-11 PROCEDURE — 3078F DIAST BP <80 MM HG: CPT | Mod: CPTII,S$GLB,, | Performed by: INTERNAL MEDICINE

## 2021-02-11 PROCEDURE — 1159F MED LIST DOCD IN RCRD: CPT | Mod: S$GLB,,, | Performed by: INTERNAL MEDICINE

## 2021-02-11 PROCEDURE — 3078F PR MOST RECENT DIASTOLIC BLOOD PRESSURE < 80 MM HG: ICD-10-PCS | Mod: CPTII,S$GLB,, | Performed by: INTERNAL MEDICINE

## 2021-02-11 PROCEDURE — 3074F SYST BP LT 130 MM HG: CPT | Mod: CPTII,S$GLB,, | Performed by: INTERNAL MEDICINE

## 2021-02-11 PROCEDURE — 1159F PR MEDICATION LIST DOCUMENTED IN MEDICAL RECORD: ICD-10-PCS | Mod: S$GLB,,, | Performed by: INTERNAL MEDICINE

## 2021-02-11 PROCEDURE — 99999 PR PBB SHADOW E&M-EST. PATIENT-LVL IV: ICD-10-PCS | Mod: PBBFAC,,, | Performed by: INTERNAL MEDICINE

## 2021-02-11 PROCEDURE — 1101F PR PT FALLS ASSESS DOC 0-1 FALLS W/OUT INJ PAST YR: ICD-10-PCS | Mod: CPTII,S$GLB,, | Performed by: INTERNAL MEDICINE

## 2021-02-11 PROCEDURE — 76770 US RETROPERITONEAL COMPLETE: ICD-10-PCS | Mod: 26,,, | Performed by: RADIOLOGY

## 2021-02-11 PROCEDURE — 1101F PT FALLS ASSESS-DOCD LE1/YR: CPT | Mod: CPTII,S$GLB,, | Performed by: INTERNAL MEDICINE

## 2021-02-11 PROCEDURE — 99999 PR PBB SHADOW E&M-EST. PATIENT-LVL IV: CPT | Mod: PBBFAC,,, | Performed by: INTERNAL MEDICINE

## 2021-02-11 PROCEDURE — 99499 RISK ADDL DX/OHS AUDIT: ICD-10-PCS | Mod: S$GLB,,, | Performed by: INTERNAL MEDICINE

## 2021-02-11 PROCEDURE — 3288F FALL RISK ASSESSMENT DOCD: CPT | Mod: CPTII,S$GLB,, | Performed by: INTERNAL MEDICINE

## 2021-02-11 PROCEDURE — 99214 PR OFFICE/OUTPT VISIT, EST, LEVL IV, 30-39 MIN: ICD-10-PCS | Mod: S$GLB,,, | Performed by: INTERNAL MEDICINE

## 2021-02-11 PROCEDURE — 99214 OFFICE O/P EST MOD 30 MIN: CPT | Mod: S$GLB,,, | Performed by: INTERNAL MEDICINE

## 2021-02-11 PROCEDURE — 76770 US EXAM ABDO BACK WALL COMP: CPT | Mod: 26,,, | Performed by: RADIOLOGY

## 2021-02-11 PROCEDURE — 3288F PR FALLS RISK ASSESSMENT DOCUMENTED: ICD-10-PCS | Mod: CPTII,S$GLB,, | Performed by: INTERNAL MEDICINE

## 2021-02-11 RX ORDER — LOSARTAN POTASSIUM 50 MG/1
50 TABLET ORAL DAILY
Qty: 90 TABLET | Refills: 1 | Status: SHIPPED | OUTPATIENT
Start: 2021-02-11 | End: 2021-08-19 | Stop reason: SDUPTHER

## 2021-02-19 DIAGNOSIS — J30.9 ALLERGIC RHINITIS, UNSPECIFIED SEASONALITY, UNSPECIFIED TRIGGER: ICD-10-CM

## 2021-02-19 DIAGNOSIS — I48.0 PAF (PAROXYSMAL ATRIAL FIBRILLATION): ICD-10-CM

## 2021-02-19 RX ORDER — PANTOPRAZOLE SODIUM 40 MG/1
40 TABLET, DELAYED RELEASE ORAL DAILY
Qty: 90 TABLET | Refills: 3 | Status: SHIPPED | OUTPATIENT
Start: 2021-02-19 | End: 2022-02-08 | Stop reason: SDUPTHER

## 2021-02-19 RX ORDER — LEVOCETIRIZINE DIHYDROCHLORIDE 5 MG/1
5 TABLET, FILM COATED ORAL NIGHTLY
Qty: 90 TABLET | Refills: 1 | Status: SHIPPED | OUTPATIENT
Start: 2021-02-19 | End: 2022-02-11

## 2021-04-11 ENCOUNTER — PATIENT MESSAGE (OUTPATIENT)
Dept: FAMILY MEDICINE | Facility: CLINIC | Age: 81
End: 2021-04-11

## 2021-04-11 DIAGNOSIS — G47.61 PERIODIC LIMB MOVEMENT DISORDER (PLMD): ICD-10-CM

## 2021-04-12 RX ORDER — CLONAZEPAM 1 MG/1
1 TABLET ORAL NIGHTLY PRN
Qty: 90 TABLET | Refills: 0 | Status: SHIPPED | OUTPATIENT
Start: 2021-04-12 | End: 2021-08-13 | Stop reason: SDUPTHER

## 2021-04-22 ENCOUNTER — PATIENT MESSAGE (OUTPATIENT)
Dept: CARDIOLOGY | Facility: HOSPITAL | Age: 81
End: 2021-04-22

## 2021-04-22 ENCOUNTER — TELEPHONE (OUTPATIENT)
Dept: CARDIOLOGY | Facility: HOSPITAL | Age: 81
End: 2021-04-22

## 2021-04-22 DIAGNOSIS — I50.42 CHRONIC COMBINED SYSTOLIC AND DIASTOLIC CONGESTIVE HEART FAILURE: ICD-10-CM

## 2021-04-22 DIAGNOSIS — Z95.0 CARDIAC PACEMAKER IN SITU: ICD-10-CM

## 2021-04-22 DIAGNOSIS — I48.0 PAROXYSMAL ATRIAL FIBRILLATION: Primary | ICD-10-CM

## 2021-04-23 ENCOUNTER — TELEPHONE (OUTPATIENT)
Dept: CARDIOLOGY | Facility: CLINIC | Age: 81
End: 2021-04-23

## 2021-04-23 ENCOUNTER — TELEPHONE (OUTPATIENT)
Dept: CARDIOLOGY | Facility: HOSPITAL | Age: 81
End: 2021-04-23

## 2021-04-23 DIAGNOSIS — Z95.810 ICD (IMPLANTABLE CARDIOVERTER-DEFIBRILLATOR) IN PLACE: Primary | ICD-10-CM

## 2021-04-25 ENCOUNTER — CLINICAL SUPPORT (OUTPATIENT)
Dept: CARDIOLOGY | Facility: HOSPITAL | Age: 81
End: 2021-04-25
Payer: MEDICARE

## 2021-04-25 DIAGNOSIS — Z95.0 PRESENCE OF CARDIAC PACEMAKER: ICD-10-CM

## 2021-04-25 PROCEDURE — 93294 CARDIAC DEVICE CHECK - REMOTE: ICD-10-PCS | Mod: HCNC,S$GLB,, | Performed by: INTERNAL MEDICINE

## 2021-04-25 PROCEDURE — 93294 REM INTERROG EVL PM/LDLS PM: CPT | Mod: HCNC,S$GLB,, | Performed by: INTERNAL MEDICINE

## 2021-04-25 PROCEDURE — 93296 REM INTERROG EVL PM/IDS: CPT | Mod: HCNC | Performed by: INTERNAL MEDICINE

## 2021-05-05 DIAGNOSIS — I51.7 CARDIOMEGALY: ICD-10-CM

## 2021-05-05 DIAGNOSIS — I25.118 CORONARY ARTERY DISEASE OF NATIVE ARTERY OF NATIVE HEART WITH STABLE ANGINA PECTORIS: ICD-10-CM

## 2021-05-05 DIAGNOSIS — D63.1 ANEMIA ASSOCIATED WITH STAGE 4 CHRONIC RENAL FAILURE: ICD-10-CM

## 2021-05-05 DIAGNOSIS — I48.0 PAROXYSMAL ATRIAL FIBRILLATION: ICD-10-CM

## 2021-05-05 DIAGNOSIS — G47.33 OBSTRUCTIVE SLEEP APNEA: ICD-10-CM

## 2021-05-05 DIAGNOSIS — N18.4 ANEMIA ASSOCIATED WITH STAGE 4 CHRONIC RENAL FAILURE: ICD-10-CM

## 2021-05-05 DIAGNOSIS — Z95.1 S/P CABG X 3: ICD-10-CM

## 2021-05-05 DIAGNOSIS — N28.9 RENAL INSUFFICIENCY: ICD-10-CM

## 2021-05-05 DIAGNOSIS — I70.0 ARTERIOSCLEROSIS OF AORTA: ICD-10-CM

## 2021-05-05 DIAGNOSIS — N18.4 STAGE 4 CHRONIC KIDNEY DISEASE: ICD-10-CM

## 2021-05-05 DIAGNOSIS — I25.2 MI, OLD: ICD-10-CM

## 2021-05-05 DIAGNOSIS — Z95.810 ICD (IMPLANTABLE CARDIOVERTER-DEFIBRILLATOR) IN PLACE: ICD-10-CM

## 2021-05-05 DIAGNOSIS — J98.4 CHRONIC RESTRICTIVE LUNG DISEASE: ICD-10-CM

## 2021-05-05 DIAGNOSIS — J43.9 MIXED RESTRICTIVE AND OBSTRUCTIVE LUNG DISEASE: Primary | ICD-10-CM

## 2021-05-05 DIAGNOSIS — D50.0 IRON DEFICIENCY ANEMIA DUE TO CHRONIC BLOOD LOSS: ICD-10-CM

## 2021-05-05 DIAGNOSIS — I10 ESSENTIAL HYPERTENSION: ICD-10-CM

## 2021-05-05 DIAGNOSIS — I50.42 CHRONIC COMBINED SYSTOLIC AND DIASTOLIC CONGESTIVE HEART FAILURE: ICD-10-CM

## 2021-05-05 DIAGNOSIS — J98.4 MIXED RESTRICTIVE AND OBSTRUCTIVE LUNG DISEASE: Primary | ICD-10-CM

## 2021-05-05 DIAGNOSIS — E78.2 MIXED HYPERLIPIDEMIA: ICD-10-CM

## 2021-05-06 ENCOUNTER — OFFICE VISIT (OUTPATIENT)
Dept: CARDIOLOGY | Facility: CLINIC | Age: 81
End: 2021-05-06
Payer: MEDICARE

## 2021-05-06 ENCOUNTER — HOSPITAL ENCOUNTER (OUTPATIENT)
Dept: CARDIOLOGY | Facility: HOSPITAL | Age: 81
Discharge: HOME | End: 2021-05-06
Attending: INTERNAL MEDICINE
Payer: MEDICARE

## 2021-05-06 ENCOUNTER — PATIENT MESSAGE (OUTPATIENT)
Dept: FAMILY MEDICINE | Facility: CLINIC | Age: 81
End: 2021-05-06

## 2021-05-06 VITALS
DIASTOLIC BLOOD PRESSURE: 64 MMHG | RESPIRATION RATE: 16 BRPM | HEART RATE: 78 BPM | BODY MASS INDEX: 31.68 KG/M2 | OXYGEN SATURATION: 98 % | HEIGHT: 69 IN | WEIGHT: 213.88 LBS | SYSTOLIC BLOOD PRESSURE: 130 MMHG

## 2021-05-06 DIAGNOSIS — I10 ESSENTIAL HYPERTENSION: ICD-10-CM

## 2021-05-06 DIAGNOSIS — N28.9 RENAL INSUFFICIENCY: ICD-10-CM

## 2021-05-06 DIAGNOSIS — R06.00 DYSPNEA, UNSPECIFIED TYPE: ICD-10-CM

## 2021-05-06 DIAGNOSIS — N18.4 ANEMIA ASSOCIATED WITH STAGE 4 CHRONIC RENAL FAILURE: ICD-10-CM

## 2021-05-06 DIAGNOSIS — I51.7 CARDIOMEGALY: ICD-10-CM

## 2021-05-06 DIAGNOSIS — J98.4 CHRONIC RESTRICTIVE LUNG DISEASE: ICD-10-CM

## 2021-05-06 DIAGNOSIS — J98.4 MIXED RESTRICTIVE AND OBSTRUCTIVE LUNG DISEASE: ICD-10-CM

## 2021-05-06 DIAGNOSIS — G47.33 OBSTRUCTIVE SLEEP APNEA: ICD-10-CM

## 2021-05-06 DIAGNOSIS — I25.2 MI, OLD: ICD-10-CM

## 2021-05-06 DIAGNOSIS — D50.0 IRON DEFICIENCY ANEMIA DUE TO CHRONIC BLOOD LOSS: ICD-10-CM

## 2021-05-06 DIAGNOSIS — I70.0 ARTERIOSCLEROSIS OF AORTA: ICD-10-CM

## 2021-05-06 DIAGNOSIS — Z95.810 ICD (IMPLANTABLE CARDIOVERTER-DEFIBRILLATOR) IN PLACE: ICD-10-CM

## 2021-05-06 DIAGNOSIS — N18.4 STAGE 4 CHRONIC KIDNEY DISEASE: ICD-10-CM

## 2021-05-06 DIAGNOSIS — I25.118 CORONARY ARTERY DISEASE OF NATIVE ARTERY OF NATIVE HEART WITH STABLE ANGINA PECTORIS: ICD-10-CM

## 2021-05-06 DIAGNOSIS — J43.9 MIXED RESTRICTIVE AND OBSTRUCTIVE LUNG DISEASE: ICD-10-CM

## 2021-05-06 DIAGNOSIS — E78.2 MIXED HYPERLIPIDEMIA: ICD-10-CM

## 2021-05-06 DIAGNOSIS — D63.1 ANEMIA ASSOCIATED WITH STAGE 4 CHRONIC RENAL FAILURE: ICD-10-CM

## 2021-05-06 DIAGNOSIS — I50.42 CHRONIC COMBINED SYSTOLIC AND DIASTOLIC CONGESTIVE HEART FAILURE: ICD-10-CM

## 2021-05-06 DIAGNOSIS — I48.0 PAROXYSMAL ATRIAL FIBRILLATION: ICD-10-CM

## 2021-05-06 DIAGNOSIS — I50.42 CHRONIC COMBINED SYSTOLIC AND DIASTOLIC CONGESTIVE HEART FAILURE: Primary | ICD-10-CM

## 2021-05-06 DIAGNOSIS — Z95.1 S/P CABG X 3: ICD-10-CM

## 2021-05-06 PROCEDURE — 99499 UNLISTED E&M SERVICE: CPT | Mod: HCNC,S$GLB,, | Performed by: INTERNAL MEDICINE

## 2021-05-06 PROCEDURE — 99499 RISK ADDL DX/OHS AUDIT: ICD-10-PCS | Mod: HCNC,S$GLB,, | Performed by: INTERNAL MEDICINE

## 2021-05-06 PROCEDURE — 1159F PR MEDICATION LIST DOCUMENTED IN MEDICAL RECORD: ICD-10-PCS | Mod: S$GLB,,, | Performed by: INTERNAL MEDICINE

## 2021-05-06 PROCEDURE — 93005 ELECTROCARDIOGRAM TRACING: CPT

## 2021-05-06 PROCEDURE — 99999 PR PBB SHADOW E&M-EST. PATIENT-LVL V: CPT | Mod: PBBFAC,,, | Performed by: INTERNAL MEDICINE

## 2021-05-06 PROCEDURE — 99214 OFFICE O/P EST MOD 30 MIN: CPT | Mod: S$GLB,,, | Performed by: INTERNAL MEDICINE

## 2021-05-06 PROCEDURE — 93010 ELECTROCARDIOGRAM REPORT: CPT | Mod: ,,, | Performed by: INTERNAL MEDICINE

## 2021-05-06 PROCEDURE — 1159F MED LIST DOCD IN RCRD: CPT | Mod: S$GLB,,, | Performed by: INTERNAL MEDICINE

## 2021-05-06 PROCEDURE — 99999 PR PBB SHADOW E&M-EST. PATIENT-LVL V: ICD-10-PCS | Mod: PBBFAC,,, | Performed by: INTERNAL MEDICINE

## 2021-05-06 PROCEDURE — 99214 PR OFFICE/OUTPT VISIT, EST, LEVL IV, 30-39 MIN: ICD-10-PCS | Mod: S$GLB,,, | Performed by: INTERNAL MEDICINE

## 2021-05-06 PROCEDURE — 93010 EKG 12-LEAD: ICD-10-PCS | Mod: ,,, | Performed by: INTERNAL MEDICINE

## 2021-05-11 ENCOUNTER — HOSPITAL ENCOUNTER (OUTPATIENT)
Dept: RADIOLOGY | Facility: HOSPITAL | Age: 81
Discharge: HOME | End: 2021-05-11
Attending: INTERNAL MEDICINE
Payer: MEDICARE

## 2021-05-11 DIAGNOSIS — R06.00 DYSPNEA, UNSPECIFIED TYPE: ICD-10-CM

## 2021-05-11 PROCEDURE — 71046 XR CHEST PA AND LATERAL: ICD-10-PCS | Mod: 26,,, | Performed by: RADIOLOGY

## 2021-05-11 PROCEDURE — 71046 X-RAY EXAM CHEST 2 VIEWS: CPT | Mod: 26,,, | Performed by: RADIOLOGY

## 2021-05-11 PROCEDURE — 71046 X-RAY EXAM CHEST 2 VIEWS: CPT | Mod: TC

## 2021-05-12 ENCOUNTER — TELEPHONE (OUTPATIENT)
Dept: CARDIOLOGY | Facility: CLINIC | Age: 81
End: 2021-05-12

## 2021-05-12 RX ORDER — POTASSIUM CHLORIDE 20 MEQ/1
20 TABLET, EXTENDED RELEASE ORAL DAILY
Qty: 90 TABLET | Refills: 1 | Status: SHIPPED | OUTPATIENT
Start: 2021-05-12 | End: 2021-07-07 | Stop reason: SDUPTHER

## 2021-05-14 ENCOUNTER — PATIENT MESSAGE (OUTPATIENT)
Dept: PAIN MEDICINE | Facility: CLINIC | Age: 81
End: 2021-05-14

## 2021-05-14 ENCOUNTER — PATIENT MESSAGE (OUTPATIENT)
Dept: FAMILY MEDICINE | Facility: CLINIC | Age: 81
End: 2021-05-14

## 2021-05-14 RX ORDER — GABAPENTIN 300 MG/1
600 CAPSULE ORAL NIGHTLY
Qty: 180 CAPSULE | Refills: 0 | Status: SHIPPED | OUTPATIENT
Start: 2021-05-14 | End: 2021-07-15

## 2021-05-15 ENCOUNTER — PATIENT MESSAGE (OUTPATIENT)
Dept: CARDIOLOGY | Facility: CLINIC | Age: 81
End: 2021-05-15

## 2021-05-16 DIAGNOSIS — G47.00 INSOMNIA, UNSPECIFIED TYPE: Primary | ICD-10-CM

## 2021-05-18 ENCOUNTER — PATIENT OUTREACH (OUTPATIENT)
Dept: ADMINISTRATIVE | Facility: OTHER | Age: 81
End: 2021-05-18

## 2021-05-19 ENCOUNTER — PATIENT MESSAGE (OUTPATIENT)
Dept: PAIN MEDICINE | Facility: CLINIC | Age: 81
End: 2021-05-19

## 2021-05-19 ENCOUNTER — TELEPHONE (OUTPATIENT)
Dept: PAIN MEDICINE | Facility: CLINIC | Age: 81
End: 2021-05-19

## 2021-05-19 ENCOUNTER — OFFICE VISIT (OUTPATIENT)
Dept: PAIN MEDICINE | Facility: CLINIC | Age: 81
End: 2021-05-19
Payer: MEDICARE

## 2021-05-19 ENCOUNTER — TELEPHONE (OUTPATIENT)
Dept: PHYSICAL MEDICINE AND REHAB | Facility: CLINIC | Age: 81
End: 2021-05-19

## 2021-05-19 VITALS
HEART RATE: 85 BPM | BODY MASS INDEX: 30.36 KG/M2 | HEIGHT: 69 IN | RESPIRATION RATE: 18 BRPM | WEIGHT: 205 LBS | SYSTOLIC BLOOD PRESSURE: 126 MMHG | DIASTOLIC BLOOD PRESSURE: 69 MMHG

## 2021-05-19 DIAGNOSIS — M54.16 LUMBAR RADICULOPATHY: ICD-10-CM

## 2021-05-19 DIAGNOSIS — M54.59 LUMBAR FACET JOINT PAIN: ICD-10-CM

## 2021-05-19 DIAGNOSIS — M51.36 DDD (DEGENERATIVE DISC DISEASE), LUMBAR: ICD-10-CM

## 2021-05-19 DIAGNOSIS — M47.816 LUMBAR SPONDYLOSIS: Primary | ICD-10-CM

## 2021-05-19 PROCEDURE — 1159F PR MEDICATION LIST DOCUMENTED IN MEDICAL RECORD: ICD-10-PCS | Mod: S$GLB,,, | Performed by: PHYSICIAN ASSISTANT

## 2021-05-19 PROCEDURE — 1159F MED LIST DOCD IN RCRD: CPT | Mod: S$GLB,,, | Performed by: PHYSICIAN ASSISTANT

## 2021-05-19 PROCEDURE — 99214 OFFICE O/P EST MOD 30 MIN: CPT | Mod: S$GLB,,, | Performed by: PHYSICIAN ASSISTANT

## 2021-05-19 PROCEDURE — 99999 PR PBB SHADOW E&M-EST. PATIENT-LVL V: ICD-10-PCS | Mod: PBBFAC,,, | Performed by: PHYSICIAN ASSISTANT

## 2021-05-19 PROCEDURE — 1125F AMNT PAIN NOTED PAIN PRSNT: CPT | Mod: S$GLB,,, | Performed by: PHYSICIAN ASSISTANT

## 2021-05-19 PROCEDURE — 99214 PR OFFICE/OUTPT VISIT, EST, LEVL IV, 30-39 MIN: ICD-10-PCS | Mod: S$GLB,,, | Performed by: PHYSICIAN ASSISTANT

## 2021-05-19 PROCEDURE — 1125F PR PAIN SEVERITY QUANTIFIED, PAIN PRESENT: ICD-10-PCS | Mod: S$GLB,,, | Performed by: PHYSICIAN ASSISTANT

## 2021-05-19 PROCEDURE — 99999 PR PBB SHADOW E&M-EST. PATIENT-LVL V: CPT | Mod: PBBFAC,,, | Performed by: PHYSICIAN ASSISTANT

## 2021-05-20 ENCOUNTER — TELEPHONE (OUTPATIENT)
Dept: PAIN MEDICINE | Facility: CLINIC | Age: 81
End: 2021-05-20

## 2021-05-24 ENCOUNTER — PATIENT MESSAGE (OUTPATIENT)
Dept: FAMILY MEDICINE | Facility: CLINIC | Age: 81
End: 2021-05-24

## 2021-05-24 ENCOUNTER — PATIENT MESSAGE (OUTPATIENT)
Dept: PAIN MEDICINE | Facility: CLINIC | Age: 81
End: 2021-05-24

## 2021-05-25 ENCOUNTER — TELEPHONE (OUTPATIENT)
Dept: PAIN MEDICINE | Facility: CLINIC | Age: 81
End: 2021-05-25

## 2021-05-25 ENCOUNTER — PATIENT MESSAGE (OUTPATIENT)
Dept: PAIN MEDICINE | Facility: CLINIC | Age: 81
End: 2021-05-25

## 2021-06-04 ENCOUNTER — HOSPITAL ENCOUNTER (OUTPATIENT)
Facility: HOSPITAL | Age: 81
Discharge: HOME | End: 2021-06-04
Attending: PHYSICAL MEDICINE & REHABILITATION | Admitting: PHYSICAL MEDICINE & REHABILITATION
Payer: MEDICARE

## 2021-06-04 DIAGNOSIS — M47.816 LUMBAR SPONDYLOSIS: Primary | ICD-10-CM

## 2021-06-04 DIAGNOSIS — M47.816 SPONDYLOSIS WITHOUT MYELOPATHY OR RADICULOPATHY, LUMBAR REGION: ICD-10-CM

## 2021-06-04 PROCEDURE — 64636 DESTROY L/S FACET JNT ADDL: CPT | Performed by: PHYSICAL MEDICINE & REHABILITATION

## 2021-06-04 PROCEDURE — 25000003 PHARM REV CODE 250: Performed by: PHYSICAL MEDICINE & REHABILITATION

## 2021-06-04 PROCEDURE — 64635 DESTROY LUMB/SAC FACET JNT: CPT | Mod: LT,,, | Performed by: PHYSICAL MEDICINE & REHABILITATION

## 2021-06-04 PROCEDURE — 64636 PR DESTROY L/S FACET JNT ADDL: ICD-10-PCS | Mod: LT,,, | Performed by: PHYSICAL MEDICINE & REHABILITATION

## 2021-06-04 PROCEDURE — 64635 DESTROY LUMB/SAC FACET JNT: CPT | Performed by: PHYSICAL MEDICINE & REHABILITATION

## 2021-06-04 PROCEDURE — 63600175 PHARM REV CODE 636 W HCPCS: Performed by: PHYSICAL MEDICINE & REHABILITATION

## 2021-06-04 PROCEDURE — 64636 DESTROY L/S FACET JNT ADDL: CPT | Mod: LT,,, | Performed by: PHYSICAL MEDICINE & REHABILITATION

## 2021-06-04 PROCEDURE — 99152 MOD SED SAME PHYS/QHP 5/>YRS: CPT | Performed by: PHYSICAL MEDICINE & REHABILITATION

## 2021-06-04 PROCEDURE — 64635 PR DESTROY LUMB/SAC FACET JNT: ICD-10-PCS | Mod: LT,,, | Performed by: PHYSICAL MEDICINE & REHABILITATION

## 2021-06-04 RX ORDER — FENTANYL CITRATE 50 UG/ML
INJECTION, SOLUTION INTRAMUSCULAR; INTRAVENOUS
Status: DISCONTINUED | OUTPATIENT
Start: 2021-06-04 | End: 2021-06-04 | Stop reason: HOSPADM

## 2021-06-04 RX ORDER — ONDANSETRON 2 MG/ML
4 INJECTION INTRAMUSCULAR; INTRAVENOUS ONCE AS NEEDED
Status: DISCONTINUED | OUTPATIENT
Start: 2021-06-04 | End: 2021-06-04 | Stop reason: HOSPADM

## 2021-06-04 RX ORDER — MIDAZOLAM HYDROCHLORIDE 1 MG/ML
INJECTION, SOLUTION INTRAMUSCULAR; INTRAVENOUS
Status: DISCONTINUED | OUTPATIENT
Start: 2021-06-04 | End: 2021-06-04 | Stop reason: HOSPADM

## 2021-06-04 RX ORDER — BUPIVACAINE HYDROCHLORIDE 2.5 MG/ML
INJECTION, SOLUTION EPIDURAL; INFILTRATION; INTRACAUDAL
Status: DISCONTINUED | OUTPATIENT
Start: 2021-06-04 | End: 2021-06-04 | Stop reason: HOSPADM

## 2021-06-07 VITALS
RESPIRATION RATE: 16 BRPM | DIASTOLIC BLOOD PRESSURE: 70 MMHG | HEART RATE: 60 BPM | SYSTOLIC BLOOD PRESSURE: 134 MMHG | TEMPERATURE: 98 F | HEIGHT: 69 IN | OXYGEN SATURATION: 100 % | BODY MASS INDEX: 29.71 KG/M2 | WEIGHT: 200.63 LBS

## 2021-06-08 ENCOUNTER — HOSPITAL ENCOUNTER (OUTPATIENT)
Dept: CARDIOLOGY | Facility: HOSPITAL | Age: 81
Discharge: HOME | End: 2021-06-08
Attending: INTERNAL MEDICINE
Payer: MEDICARE

## 2021-06-08 ENCOUNTER — OFFICE VISIT (OUTPATIENT)
Dept: CARDIOLOGY | Facility: CLINIC | Age: 81
End: 2021-06-08
Payer: MEDICARE

## 2021-06-08 VITALS
DIASTOLIC BLOOD PRESSURE: 58 MMHG | HEART RATE: 85 BPM | HEIGHT: 69 IN | SYSTOLIC BLOOD PRESSURE: 126 MMHG | OXYGEN SATURATION: 99 % | WEIGHT: 202.38 LBS | BODY MASS INDEX: 29.98 KG/M2

## 2021-06-08 DIAGNOSIS — C61 ADENOCARCINOMA OF PROSTATE: ICD-10-CM

## 2021-06-08 DIAGNOSIS — D63.1 ANEMIA ASSOCIATED WITH STAGE 4 CHRONIC RENAL FAILURE: ICD-10-CM

## 2021-06-08 DIAGNOSIS — Z95.1 S/P CABG X 3: ICD-10-CM

## 2021-06-08 DIAGNOSIS — Z95.0 CARDIAC PACEMAKER IN SITU: ICD-10-CM

## 2021-06-08 DIAGNOSIS — M47.816 LUMBAR SPONDYLOSIS: ICD-10-CM

## 2021-06-08 DIAGNOSIS — I50.42 CHRONIC COMBINED SYSTOLIC AND DIASTOLIC CONGESTIVE HEART FAILURE: Primary | ICD-10-CM

## 2021-06-08 DIAGNOSIS — J43.9 MIXED RESTRICTIVE AND OBSTRUCTIVE LUNG DISEASE: Chronic | ICD-10-CM

## 2021-06-08 DIAGNOSIS — I25.2 MI, OLD: ICD-10-CM

## 2021-06-08 DIAGNOSIS — I48.0 PAROXYSMAL ATRIAL FIBRILLATION: ICD-10-CM

## 2021-06-08 DIAGNOSIS — J98.4 MIXED RESTRICTIVE AND OBSTRUCTIVE LUNG DISEASE: Chronic | ICD-10-CM

## 2021-06-08 DIAGNOSIS — R76.8 RHEUMATOID FACTOR POSITIVE: ICD-10-CM

## 2021-06-08 DIAGNOSIS — M54.16 LUMBAR RADICULOPATHY: ICD-10-CM

## 2021-06-08 DIAGNOSIS — J98.4 CHRONIC RESTRICTIVE LUNG DISEASE: ICD-10-CM

## 2021-06-08 DIAGNOSIS — N18.4 ANEMIA ASSOCIATED WITH STAGE 4 CHRONIC RENAL FAILURE: ICD-10-CM

## 2021-06-08 DIAGNOSIS — Z45.018 BIVENTRICULAR PACEMAKER CHECK: ICD-10-CM

## 2021-06-08 DIAGNOSIS — I48.21 PERMANENT ATRIAL FIBRILLATION: ICD-10-CM

## 2021-06-08 DIAGNOSIS — I50.42 CHRONIC COMBINED SYSTOLIC AND DIASTOLIC CONGESTIVE HEART FAILURE: ICD-10-CM

## 2021-06-08 DIAGNOSIS — E79.0 HYPERURICEMIA: ICD-10-CM

## 2021-06-08 PROBLEM — Z95.810 ICD (IMPLANTABLE CARDIOVERTER-DEFIBRILLATOR) IN PLACE: Status: RESOLVED | Noted: 2020-04-21 | Resolved: 2021-06-08

## 2021-06-08 PROCEDURE — 1159F PR MEDICATION LIST DOCUMENTED IN MEDICAL RECORD: ICD-10-PCS | Mod: S$GLB,,, | Performed by: INTERNAL MEDICINE

## 2021-06-08 PROCEDURE — 1126F PR PAIN SEVERITY QUANTIFIED, NO PAIN PRESENT: ICD-10-PCS | Mod: S$GLB,,, | Performed by: INTERNAL MEDICINE

## 2021-06-08 PROCEDURE — 1126F AMNT PAIN NOTED NONE PRSNT: CPT | Mod: S$GLB,,, | Performed by: INTERNAL MEDICINE

## 2021-06-08 PROCEDURE — 1159F MED LIST DOCD IN RCRD: CPT | Mod: S$GLB,,, | Performed by: INTERNAL MEDICINE

## 2021-06-08 PROCEDURE — 93281 CARDIAC DEVICE CHECK - IN CLINIC & HOSPITAL: ICD-10-PCS | Mod: 26,,, | Performed by: INTERNAL MEDICINE

## 2021-06-08 PROCEDURE — 99499 UNLISTED E&M SERVICE: CPT | Mod: HCNC,S$GLB,, | Performed by: INTERNAL MEDICINE

## 2021-06-08 PROCEDURE — 99999 PR PBB SHADOW E&M-EST. PATIENT-LVL IV: CPT | Mod: PBBFAC,,, | Performed by: INTERNAL MEDICINE

## 2021-06-08 PROCEDURE — 99205 PR OFFICE/OUTPT VISIT, NEW, LEVL V, 60-74 MIN: ICD-10-PCS | Mod: S$GLB,,, | Performed by: INTERNAL MEDICINE

## 2021-06-08 PROCEDURE — 99205 OFFICE O/P NEW HI 60 MIN: CPT | Mod: S$GLB,,, | Performed by: INTERNAL MEDICINE

## 2021-06-08 PROCEDURE — 99999 PR PBB SHADOW E&M-EST. PATIENT-LVL IV: ICD-10-PCS | Mod: PBBFAC,,, | Performed by: INTERNAL MEDICINE

## 2021-06-08 PROCEDURE — 99499 RISK ADDL DX/OHS AUDIT: ICD-10-PCS | Mod: HCNC,S$GLB,, | Performed by: INTERNAL MEDICINE

## 2021-06-08 PROCEDURE — 93281 PM DEVICE PROGR EVAL MULTI: CPT

## 2021-06-08 PROCEDURE — 93281 PM DEVICE PROGR EVAL MULTI: CPT | Mod: 26,,, | Performed by: INTERNAL MEDICINE

## 2021-06-10 LAB
BATTERY VOLTAGE (V): 2.87 V
ERI (V): 2.77 V
IMPEDANCE RA LEAD (DONOR): 741 OHMS
IMPEDANCE RA LEAD (NATIVE): 418 OHMS
IMPEDANCE RA LEAD: 418 OHMS
OHS CV DC PP MS2: 0.4 MS
OHS CV DC PP MS3: 1.5 MS
OHS CV DC PP V2: NORMAL V
OHS CV DC PP V3: NORMAL V
P/R-WAVE RA LEAD (NATIVE): 2.6 MV
P/R-WAVE RA LEAD: 0.4 MV
THRESHOLD MS RA LEAD (DONOR): 1.5 MS
THRESHOLD MS RA LEAD (NATIVE): 0.4 MS
THRESHOLD MS RA LEAD: 1 MS
THRESHOLD V RA LEAD (DONOR): 2.5 V
THRESHOLD V RA LEAD (NATIVE): 0.75 V
THRESHOLD V RA LEAD: 0.5 V

## 2021-06-14 DIAGNOSIS — J30.9 ALLERGIC RHINITIS, UNSPECIFIED SEASONALITY, UNSPECIFIED TRIGGER: ICD-10-CM

## 2021-06-14 RX ORDER — FLUTICASONE PROPIONATE 50 MCG
2 SPRAY, SUSPENSION (ML) NASAL DAILY
Qty: 48 G | Refills: 5 | Status: SHIPPED | OUTPATIENT
Start: 2021-06-14 | End: 2022-06-22

## 2021-06-17 ENCOUNTER — PATIENT MESSAGE (OUTPATIENT)
Dept: CARDIOLOGY | Facility: CLINIC | Age: 81
End: 2021-06-17

## 2021-06-17 DIAGNOSIS — I48.0 PAROXYSMAL ATRIAL FIBRILLATION: ICD-10-CM

## 2021-06-17 DIAGNOSIS — I10 ESSENTIAL HYPERTENSION: ICD-10-CM

## 2021-06-17 RX ORDER — FUROSEMIDE 40 MG/1
40 TABLET ORAL 2 TIMES DAILY
Qty: 180 TABLET | Refills: 1 | Status: SHIPPED | OUTPATIENT
Start: 2021-06-17 | End: 2021-06-17 | Stop reason: SDUPTHER

## 2021-06-17 RX ORDER — FUROSEMIDE 40 MG/1
40 TABLET ORAL 2 TIMES DAILY
Qty: 30 TABLET | Refills: 0 | Status: SHIPPED | OUTPATIENT
Start: 2021-06-17 | End: 2021-06-18 | Stop reason: SDUPTHER

## 2021-06-18 ENCOUNTER — HOSPITAL ENCOUNTER (OUTPATIENT)
Facility: HOSPITAL | Age: 81
Discharge: HOME | End: 2021-06-18
Attending: PHYSICAL MEDICINE & REHABILITATION | Admitting: PHYSICAL MEDICINE & REHABILITATION
Payer: MEDICARE

## 2021-06-18 DIAGNOSIS — M47.816 LUMBAR SPONDYLOSIS: Primary | ICD-10-CM

## 2021-06-18 DIAGNOSIS — I48.0 PAROXYSMAL ATRIAL FIBRILLATION: ICD-10-CM

## 2021-06-18 DIAGNOSIS — I10 ESSENTIAL HYPERTENSION: ICD-10-CM

## 2021-06-18 LAB — POCT GLUCOSE: 152 MG/DL (ref 70–110)

## 2021-06-18 PROCEDURE — 64635 DESTROY LUMB/SAC FACET JNT: CPT | Mod: RT,,, | Performed by: PHYSICAL MEDICINE & REHABILITATION

## 2021-06-18 PROCEDURE — 25000003 PHARM REV CODE 250: Performed by: PHYSICAL MEDICINE & REHABILITATION

## 2021-06-18 PROCEDURE — 64635 DESTROY LUMB/SAC FACET JNT: CPT | Performed by: PHYSICAL MEDICINE & REHABILITATION

## 2021-06-18 PROCEDURE — 82962 GLUCOSE BLOOD TEST: CPT | Performed by: PHYSICAL MEDICINE & REHABILITATION

## 2021-06-18 PROCEDURE — 64636 DESTROY L/S FACET JNT ADDL: CPT | Performed by: PHYSICAL MEDICINE & REHABILITATION

## 2021-06-18 PROCEDURE — 64636 DESTROY L/S FACET JNT ADDL: CPT | Mod: RT,,, | Performed by: PHYSICAL MEDICINE & REHABILITATION

## 2021-06-18 PROCEDURE — 63600175 PHARM REV CODE 636 W HCPCS: Performed by: PHYSICAL MEDICINE & REHABILITATION

## 2021-06-18 PROCEDURE — 64635 PR DESTROY LUMB/SAC FACET JNT: ICD-10-PCS | Mod: RT,,, | Performed by: PHYSICAL MEDICINE & REHABILITATION

## 2021-06-18 PROCEDURE — 99152 MOD SED SAME PHYS/QHP 5/>YRS: CPT | Performed by: PHYSICAL MEDICINE & REHABILITATION

## 2021-06-18 PROCEDURE — 64636 PR DESTROY L/S FACET JNT ADDL: ICD-10-PCS | Mod: RT,,, | Performed by: PHYSICAL MEDICINE & REHABILITATION

## 2021-06-18 RX ORDER — MIDAZOLAM HYDROCHLORIDE 1 MG/ML
INJECTION, SOLUTION INTRAMUSCULAR; INTRAVENOUS
Status: DISCONTINUED | OUTPATIENT
Start: 2021-06-18 | End: 2021-06-18 | Stop reason: HOSPADM

## 2021-06-18 RX ORDER — FUROSEMIDE 40 MG/1
40 TABLET ORAL 2 TIMES DAILY
Qty: 60 TABLET | Refills: 3 | Status: SHIPPED | OUTPATIENT
Start: 2021-06-18 | End: 2021-06-21 | Stop reason: SDUPTHER

## 2021-06-18 RX ORDER — FENTANYL CITRATE 50 UG/ML
INJECTION, SOLUTION INTRAMUSCULAR; INTRAVENOUS
Status: DISCONTINUED | OUTPATIENT
Start: 2021-06-18 | End: 2021-06-18 | Stop reason: HOSPADM

## 2021-06-18 RX ORDER — BUPIVACAINE HYDROCHLORIDE 2.5 MG/ML
INJECTION, SOLUTION EPIDURAL; INFILTRATION; INTRACAUDAL
Status: DISCONTINUED | OUTPATIENT
Start: 2021-06-18 | End: 2021-06-18 | Stop reason: HOSPADM

## 2021-06-21 VITALS
BODY MASS INDEX: 29.48 KG/M2 | SYSTOLIC BLOOD PRESSURE: 128 MMHG | WEIGHT: 199.06 LBS | OXYGEN SATURATION: 99 % | HEART RATE: 70 BPM | TEMPERATURE: 98 F | RESPIRATION RATE: 17 BRPM | HEIGHT: 69 IN | DIASTOLIC BLOOD PRESSURE: 64 MMHG

## 2021-06-21 DIAGNOSIS — I48.0 PAROXYSMAL ATRIAL FIBRILLATION: ICD-10-CM

## 2021-06-21 DIAGNOSIS — I10 ESSENTIAL HYPERTENSION: ICD-10-CM

## 2021-06-21 RX ORDER — FUROSEMIDE 40 MG/1
40 TABLET ORAL 2 TIMES DAILY
Qty: 60 TABLET | Refills: 3 | Status: SHIPPED | OUTPATIENT
Start: 2021-06-21 | End: 2021-09-01

## 2021-06-22 ENCOUNTER — PES CALL (OUTPATIENT)
Dept: ADMINISTRATIVE | Facility: CLINIC | Age: 81
End: 2021-06-22

## 2021-07-01 ENCOUNTER — TELEPHONE (OUTPATIENT)
Dept: ADMINISTRATIVE | Facility: HOSPITAL | Age: 81
End: 2021-07-01

## 2021-07-06 ENCOUNTER — OFFICE VISIT (OUTPATIENT)
Dept: FAMILY MEDICINE | Facility: CLINIC | Age: 81
End: 2021-07-06
Payer: MEDICARE

## 2021-07-06 VITALS
OXYGEN SATURATION: 98 % | WEIGHT: 205.94 LBS | TEMPERATURE: 98 F | DIASTOLIC BLOOD PRESSURE: 58 MMHG | SYSTOLIC BLOOD PRESSURE: 132 MMHG | HEART RATE: 85 BPM | BODY MASS INDEX: 30.41 KG/M2

## 2021-07-06 DIAGNOSIS — N18.4 STAGE 4 CHRONIC KIDNEY DISEASE: ICD-10-CM

## 2021-07-06 DIAGNOSIS — E11.69 DIABETES MELLITUS TYPE 2 IN OBESE: ICD-10-CM

## 2021-07-06 DIAGNOSIS — K51.40 PSEUDOPOLYPOSIS OF COLON WITHOUT COMPLICATION, UNSPECIFIED PART OF COLON: ICD-10-CM

## 2021-07-06 DIAGNOSIS — M54.16 LUMBAR RADICULOPATHY: ICD-10-CM

## 2021-07-06 DIAGNOSIS — D71 GRANULOMATOUS DISEASE: ICD-10-CM

## 2021-07-06 DIAGNOSIS — D63.1 ANEMIA ASSOCIATED WITH STAGE 4 CHRONIC RENAL FAILURE: ICD-10-CM

## 2021-07-06 DIAGNOSIS — N18.4 ANEMIA ASSOCIATED WITH STAGE 4 CHRONIC RENAL FAILURE: ICD-10-CM

## 2021-07-06 DIAGNOSIS — E66.9 DIABETES MELLITUS TYPE 2 IN OBESE: ICD-10-CM

## 2021-07-06 DIAGNOSIS — I48.21 PERMANENT ATRIAL FIBRILLATION: ICD-10-CM

## 2021-07-06 DIAGNOSIS — I50.42 CHRONIC COMBINED SYSTOLIC (CONGESTIVE) AND DIASTOLIC (CONGESTIVE) HEART FAILURE: ICD-10-CM

## 2021-07-06 DIAGNOSIS — I25.10 CORONARY ARTERY DISEASE INVOLVING NATIVE CORONARY ARTERY OF NATIVE HEART WITHOUT ANGINA PECTORIS: Primary | ICD-10-CM

## 2021-07-06 PROCEDURE — 1159F MED LIST DOCD IN RCRD: CPT | Mod: S$GLB,,, | Performed by: FAMILY MEDICINE

## 2021-07-06 PROCEDURE — 99214 OFFICE O/P EST MOD 30 MIN: CPT | Mod: S$GLB,,, | Performed by: FAMILY MEDICINE

## 2021-07-06 PROCEDURE — 99999 PR PBB SHADOW E&M-EST. PATIENT-LVL II: ICD-10-PCS | Mod: PBBFAC,,, | Performed by: FAMILY MEDICINE

## 2021-07-06 PROCEDURE — 99499 RISK ADDL DX/OHS AUDIT: ICD-10-PCS | Mod: HCNC,S$GLB,, | Performed by: FAMILY MEDICINE

## 2021-07-06 PROCEDURE — 99999 PR PBB SHADOW E&M-EST. PATIENT-LVL II: CPT | Mod: PBBFAC,,, | Performed by: FAMILY MEDICINE

## 2021-07-06 PROCEDURE — 99499 UNLISTED E&M SERVICE: CPT | Mod: HCNC,S$GLB,, | Performed by: FAMILY MEDICINE

## 2021-07-06 PROCEDURE — 99214 PR OFFICE/OUTPT VISIT, EST, LEVL IV, 30-39 MIN: ICD-10-PCS | Mod: S$GLB,,, | Performed by: FAMILY MEDICINE

## 2021-07-06 PROCEDURE — 1159F PR MEDICATION LIST DOCUMENTED IN MEDICAL RECORD: ICD-10-PCS | Mod: S$GLB,,, | Performed by: FAMILY MEDICINE

## 2021-07-07 ENCOUNTER — HOSPITAL ENCOUNTER (EMERGENCY)
Facility: HOSPITAL | Age: 81
Discharge: HOME | End: 2021-07-08
Attending: EMERGENCY MEDICINE
Payer: MEDICARE

## 2021-07-07 DIAGNOSIS — D64.9 SYMPTOMATIC ANEMIA: Primary | ICD-10-CM

## 2021-07-07 DIAGNOSIS — I10 ESSENTIAL HYPERTENSION: ICD-10-CM

## 2021-07-07 DIAGNOSIS — N28.9 RENAL INSUFFICIENCY: ICD-10-CM

## 2021-07-07 LAB
ABO + RH BLD: NORMAL
ALBUMIN SERPL BCP-MCNC: 4.1 G/DL (ref 3.5–5.2)
ALP SERPL-CCNC: 77 U/L (ref 55–135)
ALT SERPL W/O P-5'-P-CCNC: 9 U/L (ref 10–44)
ANION GAP SERPL CALC-SCNC: 13 MMOL/L (ref 8–16)
AST SERPL-CCNC: 13 U/L (ref 10–40)
BASOPHILS # BLD AUTO: 0.05 K/UL (ref 0–0.2)
BASOPHILS NFR BLD: 0.8 % (ref 0–1.9)
BILIRUB SERPL-MCNC: 0.3 MG/DL (ref 0.1–1)
BLD GP AB SCN CELLS X3 SERPL QL: NORMAL
BUN SERPL-MCNC: 45 MG/DL (ref 8–23)
CALCIUM SERPL-MCNC: 9 MG/DL (ref 8.7–10.5)
CHLORIDE SERPL-SCNC: 104 MMOL/L (ref 95–110)
CK SERPL-CCNC: 79 U/L (ref 20–200)
CO2 SERPL-SCNC: 21 MMOL/L (ref 23–29)
CREAT SERPL-MCNC: 2.2 MG/DL (ref 0.5–1.4)
DIFFERENTIAL METHOD: ABNORMAL
EOSINOPHIL # BLD AUTO: 0.2 K/UL (ref 0–0.5)
EOSINOPHIL NFR BLD: 2.5 % (ref 0–8)
ERYTHROCYTE [DISTWIDTH] IN BLOOD BY AUTOMATED COUNT: 17.6 % (ref 11.5–14.5)
EST. GFR  (AFRICAN AMERICAN): 32 ML/MIN/1.73 M^2
EST. GFR  (NON AFRICAN AMERICAN): 27 ML/MIN/1.73 M^2
GLUCOSE SERPL-MCNC: 156 MG/DL (ref 70–110)
HCT VFR BLD AUTO: 21.4 % (ref 40–54)
HCV AB SERPL QL IA: NEGATIVE
HEP C VIRUS HOLD SPECIMEN: NORMAL
HGB BLD-MCNC: 6 G/DL (ref 14–18)
IMM GRANULOCYTES # BLD AUTO: 0.02 K/UL (ref 0–0.04)
IMM GRANULOCYTES NFR BLD AUTO: 0.3 % (ref 0–0.5)
LDH SERPL L TO P-CCNC: 262 U/L (ref 110–260)
LYMPHOCYTES # BLD AUTO: 1 K/UL (ref 1–4.8)
LYMPHOCYTES NFR BLD: 16.3 % (ref 18–48)
MCH RBC QN AUTO: 21.7 PG (ref 27–31)
MCHC RBC AUTO-ENTMCNC: 28 G/DL (ref 32–36)
MCV RBC AUTO: 77 FL (ref 82–98)
MONOCYTES # BLD AUTO: 0.6 K/UL (ref 0.3–1)
MONOCYTES NFR BLD: 10.9 % (ref 4–15)
NEUTROPHILS # BLD AUTO: 4.1 K/UL (ref 1.8–7.7)
NEUTROPHILS NFR BLD: 69.2 % (ref 38–73)
NRBC BLD-RTO: 0 /100 WBC
PLATELET # BLD AUTO: 301 K/UL (ref 150–450)
PMV BLD AUTO: 8.6 FL (ref 9.2–12.9)
POTASSIUM SERPL-SCNC: 4.4 MMOL/L (ref 3.5–5.1)
PROT SERPL-MCNC: 7.8 G/DL (ref 6–8.4)
RBC # BLD AUTO: 2.77 M/UL (ref 4.6–6.2)
RETICS/RBC NFR AUTO: 1.9 % (ref 0.4–2)
SODIUM SERPL-SCNC: 138 MMOL/L (ref 136–145)
WBC # BLD AUTO: 5.89 K/UL (ref 3.9–12.7)

## 2021-07-07 PROCEDURE — 85045 AUTOMATED RETICULOCYTE COUNT: CPT | Performed by: EMERGENCY MEDICINE

## 2021-07-07 PROCEDURE — 86803 HEPATITIS C AB TEST: CPT | Performed by: EMERGENCY MEDICINE

## 2021-07-07 PROCEDURE — 83540 ASSAY OF IRON: CPT | Performed by: EMERGENCY MEDICINE

## 2021-07-07 PROCEDURE — 36430 TRANSFUSION BLD/BLD COMPNT: CPT

## 2021-07-07 PROCEDURE — 99291 CRITICAL CARE FIRST HOUR: CPT | Mod: 25

## 2021-07-07 PROCEDURE — 80053 COMPREHEN METABOLIC PANEL: CPT | Performed by: PHYSICIAN ASSISTANT

## 2021-07-07 PROCEDURE — 82607 VITAMIN B-12: CPT | Performed by: EMERGENCY MEDICINE

## 2021-07-07 PROCEDURE — 82746 ASSAY OF FOLIC ACID SERUM: CPT | Performed by: EMERGENCY MEDICINE

## 2021-07-07 PROCEDURE — 86900 BLOOD TYPING SEROLOGIC ABO: CPT | Performed by: PHYSICIAN ASSISTANT

## 2021-07-07 PROCEDURE — 86920 COMPATIBILITY TEST SPIN: CPT | Mod: 59 | Performed by: EMERGENCY MEDICINE

## 2021-07-07 PROCEDURE — 82550 ASSAY OF CK (CPK): CPT | Performed by: EMERGENCY MEDICINE

## 2021-07-07 PROCEDURE — 83615 LACTATE (LD) (LDH) ENZYME: CPT | Performed by: EMERGENCY MEDICINE

## 2021-07-07 PROCEDURE — P9016 RBC LEUKOCYTES REDUCED: HCPCS | Performed by: EMERGENCY MEDICINE

## 2021-07-07 PROCEDURE — 85025 COMPLETE CBC W/AUTO DIFF WBC: CPT | Performed by: PHYSICIAN ASSISTANT

## 2021-07-07 RX ORDER — POTASSIUM CHLORIDE 20 MEQ/1
20 TABLET, EXTENDED RELEASE ORAL DAILY
Qty: 90 TABLET | Refills: 1 | Status: SHIPPED | OUTPATIENT
Start: 2021-07-07 | End: 2021-07-08 | Stop reason: SDUPTHER

## 2021-07-07 RX ORDER — HYDROCODONE BITARTRATE AND ACETAMINOPHEN 500; 5 MG/1; MG/1
TABLET ORAL
Status: DISCONTINUED | OUTPATIENT
Start: 2021-07-07 | End: 2021-07-08 | Stop reason: HOSPADM

## 2021-07-08 ENCOUNTER — PATIENT MESSAGE (OUTPATIENT)
Dept: FAMILY MEDICINE | Facility: CLINIC | Age: 81
End: 2021-07-08

## 2021-07-08 VITALS
RESPIRATION RATE: 18 BRPM | OXYGEN SATURATION: 100 % | TEMPERATURE: 98 F | WEIGHT: 204.38 LBS | BODY MASS INDEX: 30.27 KG/M2 | HEIGHT: 69 IN | SYSTOLIC BLOOD PRESSURE: 129 MMHG | HEART RATE: 78 BPM | DIASTOLIC BLOOD PRESSURE: 58 MMHG

## 2021-07-08 LAB
BLD PROD TYP BPU: NORMAL
BLD PROD TYP BPU: NORMAL
BLOOD UNIT EXPIRATION DATE: NORMAL
BLOOD UNIT EXPIRATION DATE: NORMAL
BLOOD UNIT TYPE CODE: 5100
BLOOD UNIT TYPE CODE: 5100
BLOOD UNIT TYPE: NORMAL
BLOOD UNIT TYPE: NORMAL
CODING SYSTEM: NORMAL
CODING SYSTEM: NORMAL
DISPENSE STATUS: NORMAL
DISPENSE STATUS: NORMAL
FOLATE SERPL-MCNC: 18.2 NG/ML (ref 4–24)
IRON SERPL-MCNC: 12 UG/DL (ref 45–160)
NUM UNITS TRANS PACKED RBC: NORMAL
NUM UNITS TRANS PACKED RBC: NORMAL
SATURATED IRON: 3 % (ref 20–50)
TOTAL IRON BINDING CAPACITY: 459 UG/DL (ref 250–450)
TRANSFERRIN SERPL-MCNC: 310 MG/DL (ref 200–375)
VIT B12 SERPL-MCNC: 458 PG/ML (ref 210–950)

## 2021-07-08 PROCEDURE — P9016 RBC LEUKOCYTES REDUCED: HCPCS | Performed by: EMERGENCY MEDICINE

## 2021-07-09 ENCOUNTER — PATIENT OUTREACH (OUTPATIENT)
Dept: ADMINISTRATIVE | Facility: HOSPITAL | Age: 81
End: 2021-07-09

## 2021-07-09 DIAGNOSIS — D64.9 SEVERE ANEMIA: Primary | ICD-10-CM

## 2021-07-09 RX ORDER — POTASSIUM CHLORIDE 20 MEQ/1
20 TABLET, EXTENDED RELEASE ORAL DAILY
Qty: 90 TABLET | Refills: 1 | Status: SHIPPED | OUTPATIENT
Start: 2021-07-09 | End: 2021-09-01 | Stop reason: ALTCHOICE

## 2021-07-12 ENCOUNTER — LAB VISIT (OUTPATIENT)
Dept: LAB | Facility: HOSPITAL | Age: 81
End: 2021-07-12
Attending: FAMILY MEDICINE
Payer: MEDICARE

## 2021-07-12 ENCOUNTER — OFFICE VISIT (OUTPATIENT)
Dept: FAMILY MEDICINE | Facility: CLINIC | Age: 81
End: 2021-07-12
Payer: MEDICARE

## 2021-07-12 VITALS
HEIGHT: 69 IN | SYSTOLIC BLOOD PRESSURE: 132 MMHG | HEART RATE: 81 BPM | DIASTOLIC BLOOD PRESSURE: 67 MMHG | TEMPERATURE: 97 F | WEIGHT: 201.81 LBS | BODY MASS INDEX: 29.89 KG/M2 | RESPIRATION RATE: 16 BRPM | OXYGEN SATURATION: 97 %

## 2021-07-12 DIAGNOSIS — E11.69 DIABETES MELLITUS TYPE 2 IN OBESE: ICD-10-CM

## 2021-07-12 DIAGNOSIS — I25.10 CORONARY ARTERY DISEASE INVOLVING NATIVE CORONARY ARTERY OF NATIVE HEART WITHOUT ANGINA PECTORIS: ICD-10-CM

## 2021-07-12 DIAGNOSIS — E66.9 DIABETES MELLITUS TYPE 2 IN OBESE: ICD-10-CM

## 2021-07-12 DIAGNOSIS — D64.9 SEVERE ANEMIA: ICD-10-CM

## 2021-07-12 DIAGNOSIS — N18.4 STAGE 4 CHRONIC KIDNEY DISEASE: ICD-10-CM

## 2021-07-12 DIAGNOSIS — I48.21 PERMANENT ATRIAL FIBRILLATION: ICD-10-CM

## 2021-07-12 DIAGNOSIS — D64.9 SEVERE ANEMIA: Primary | ICD-10-CM

## 2021-07-12 LAB
BASOPHILS # BLD AUTO: 0.05 K/UL (ref 0–0.2)
BASOPHILS NFR BLD: 0.9 % (ref 0–1.9)
DIFFERENTIAL METHOD: ABNORMAL
EOSINOPHIL # BLD AUTO: 0.1 K/UL (ref 0–0.5)
EOSINOPHIL NFR BLD: 2.3 % (ref 0–8)
ERYTHROCYTE [DISTWIDTH] IN BLOOD BY AUTOMATED COUNT: 19 % (ref 11.5–14.5)
HCT VFR BLD AUTO: 28.6 % (ref 40–54)
HGB BLD-MCNC: 8.3 G/DL (ref 14–18)
IMM GRANULOCYTES # BLD AUTO: 0.02 K/UL (ref 0–0.04)
IMM GRANULOCYTES NFR BLD AUTO: 0.4 % (ref 0–0.5)
LYMPHOCYTES # BLD AUTO: 1 K/UL (ref 1–4.8)
LYMPHOCYTES NFR BLD: 17.1 % (ref 18–48)
MCH RBC QN AUTO: 23.5 PG (ref 27–31)
MCHC RBC AUTO-ENTMCNC: 29 G/DL (ref 32–36)
MCV RBC AUTO: 81 FL (ref 82–98)
MONOCYTES # BLD AUTO: 0.7 K/UL (ref 0.3–1)
MONOCYTES NFR BLD: 11.9 % (ref 4–15)
NEUTROPHILS # BLD AUTO: 3.8 K/UL (ref 1.8–7.7)
NEUTROPHILS NFR BLD: 67.4 % (ref 38–73)
NRBC BLD-RTO: 0 /100 WBC
PLATELET # BLD AUTO: 358 K/UL (ref 150–450)
PMV BLD AUTO: 9.6 FL (ref 9.2–12.9)
RBC # BLD AUTO: 3.53 M/UL (ref 4.6–6.2)
WBC # BLD AUTO: 5.56 K/UL (ref 3.9–12.7)

## 2021-07-12 PROCEDURE — 99214 OFFICE O/P EST MOD 30 MIN: CPT | Mod: S$GLB,,, | Performed by: FAMILY MEDICINE

## 2021-07-12 PROCEDURE — 3288F FALL RISK ASSESSMENT DOCD: CPT | Mod: CPTII,S$GLB,, | Performed by: FAMILY MEDICINE

## 2021-07-12 PROCEDURE — 1159F MED LIST DOCD IN RCRD: CPT | Mod: S$GLB,,, | Performed by: FAMILY MEDICINE

## 2021-07-12 PROCEDURE — 1126F AMNT PAIN NOTED NONE PRSNT: CPT | Mod: S$GLB,,, | Performed by: FAMILY MEDICINE

## 2021-07-12 PROCEDURE — 99214 PR OFFICE/OUTPT VISIT, EST, LEVL IV, 30-39 MIN: ICD-10-PCS | Mod: S$GLB,,, | Performed by: FAMILY MEDICINE

## 2021-07-12 PROCEDURE — 99999 PR PBB SHADOW E&M-EST. PATIENT-LVL III: ICD-10-PCS | Mod: PBBFAC,,, | Performed by: FAMILY MEDICINE

## 2021-07-12 PROCEDURE — 85025 COMPLETE CBC W/AUTO DIFF WBC: CPT | Performed by: FAMILY MEDICINE

## 2021-07-12 PROCEDURE — 1159F PR MEDICATION LIST DOCUMENTED IN MEDICAL RECORD: ICD-10-PCS | Mod: S$GLB,,, | Performed by: FAMILY MEDICINE

## 2021-07-12 PROCEDURE — 1101F PR PT FALLS ASSESS DOC 0-1 FALLS W/OUT INJ PAST YR: ICD-10-PCS | Mod: CPTII,S$GLB,, | Performed by: FAMILY MEDICINE

## 2021-07-12 PROCEDURE — 1101F PT FALLS ASSESS-DOCD LE1/YR: CPT | Mod: CPTII,S$GLB,, | Performed by: FAMILY MEDICINE

## 2021-07-12 PROCEDURE — 1126F PR PAIN SEVERITY QUANTIFIED, NO PAIN PRESENT: ICD-10-PCS | Mod: S$GLB,,, | Performed by: FAMILY MEDICINE

## 2021-07-12 PROCEDURE — 99999 PR PBB SHADOW E&M-EST. PATIENT-LVL III: CPT | Mod: PBBFAC,,, | Performed by: FAMILY MEDICINE

## 2021-07-12 PROCEDURE — 99499 RISK ADDL DX/OHS AUDIT: ICD-10-PCS | Mod: HCNC,S$GLB,, | Performed by: FAMILY MEDICINE

## 2021-07-12 PROCEDURE — 36415 COLL VENOUS BLD VENIPUNCTURE: CPT | Mod: PO | Performed by: FAMILY MEDICINE

## 2021-07-12 PROCEDURE — 3288F PR FALLS RISK ASSESSMENT DOCUMENTED: ICD-10-PCS | Mod: CPTII,S$GLB,, | Performed by: FAMILY MEDICINE

## 2021-07-12 PROCEDURE — 99499 UNLISTED E&M SERVICE: CPT | Mod: HCNC,S$GLB,, | Performed by: FAMILY MEDICINE

## 2021-07-15 ENCOUNTER — OFFICE VISIT (OUTPATIENT)
Dept: CARDIOLOGY | Facility: CLINIC | Age: 81
End: 2021-07-15
Payer: MEDICARE

## 2021-07-15 ENCOUNTER — OFFICE VISIT (OUTPATIENT)
Dept: INTERNAL MEDICINE | Facility: CLINIC | Age: 81
End: 2021-07-15
Payer: MEDICARE

## 2021-07-15 VITALS
HEIGHT: 69 IN | DIASTOLIC BLOOD PRESSURE: 62 MMHG | OXYGEN SATURATION: 98 % | WEIGHT: 200.63 LBS | SYSTOLIC BLOOD PRESSURE: 134 MMHG | HEART RATE: 84 BPM | BODY MASS INDEX: 29.71 KG/M2

## 2021-07-15 VITALS
HEIGHT: 69 IN | HEART RATE: 84 BPM | OXYGEN SATURATION: 98 % | DIASTOLIC BLOOD PRESSURE: 62 MMHG | WEIGHT: 201.06 LBS | SYSTOLIC BLOOD PRESSURE: 134 MMHG | BODY MASS INDEX: 29.78 KG/M2

## 2021-07-15 DIAGNOSIS — N25.81 SECONDARY HYPERPARATHYROIDISM OF RENAL ORIGIN: ICD-10-CM

## 2021-07-15 DIAGNOSIS — Z87.898 HISTORY OF ASCITES: ICD-10-CM

## 2021-07-15 DIAGNOSIS — J98.4 MIXED RESTRICTIVE AND OBSTRUCTIVE LUNG DISEASE: Chronic | ICD-10-CM

## 2021-07-15 DIAGNOSIS — D63.1 ANEMIA ASSOCIATED WITH STAGE 4 CHRONIC RENAL FAILURE: ICD-10-CM

## 2021-07-15 DIAGNOSIS — I25.118 CORONARY ARTERY DISEASE OF NATIVE ARTERY OF NATIVE HEART WITH STABLE ANGINA PECTORIS: ICD-10-CM

## 2021-07-15 DIAGNOSIS — N18.4 ANEMIA ASSOCIATED WITH STAGE 4 CHRONIC RENAL FAILURE: ICD-10-CM

## 2021-07-15 DIAGNOSIS — N18.30 DIABETES MELLITUS WITH STAGE 3 CHRONIC KIDNEY DISEASE: ICD-10-CM

## 2021-07-15 DIAGNOSIS — L40.9 PSORIASIS: ICD-10-CM

## 2021-07-15 DIAGNOSIS — J98.4 CHRONIC RESTRICTIVE LUNG DISEASE: ICD-10-CM

## 2021-07-15 DIAGNOSIS — Z95.1 S/P CABG X 3: ICD-10-CM

## 2021-07-15 DIAGNOSIS — D50.0 IRON DEFICIENCY ANEMIA DUE TO CHRONIC BLOOD LOSS: ICD-10-CM

## 2021-07-15 DIAGNOSIS — J43.9 MIXED RESTRICTIVE AND OBSTRUCTIVE LUNG DISEASE: Chronic | ICD-10-CM

## 2021-07-15 DIAGNOSIS — D71 GRANULOMATOUS DISEASE: ICD-10-CM

## 2021-07-15 DIAGNOSIS — I10 ESSENTIAL HYPERTENSION: ICD-10-CM

## 2021-07-15 DIAGNOSIS — E78.5 HYPERLIPIDEMIA ASSOCIATED WITH TYPE 2 DIABETES MELLITUS: ICD-10-CM

## 2021-07-15 DIAGNOSIS — G47.33 OBSTRUCTIVE SLEEP APNEA: ICD-10-CM

## 2021-07-15 DIAGNOSIS — Z00.00 ENCOUNTER FOR PREVENTIVE HEALTH EXAMINATION: Primary | ICD-10-CM

## 2021-07-15 DIAGNOSIS — G47.33 OSA (OBSTRUCTIVE SLEEP APNEA): ICD-10-CM

## 2021-07-15 DIAGNOSIS — E11.22 DIABETES MELLITUS WITH STAGE 3 CHRONIC KIDNEY DISEASE: ICD-10-CM

## 2021-07-15 DIAGNOSIS — E78.2 MIXED HYPERLIPIDEMIA: ICD-10-CM

## 2021-07-15 DIAGNOSIS — I25.118 CORONARY ARTERY DISEASE OF NATIVE ARTERY OF NATIVE HEART WITH STABLE ANGINA PECTORIS: Primary | ICD-10-CM

## 2021-07-15 DIAGNOSIS — I48.91 ATRIAL FIBRILLATION, UNSPECIFIED TYPE: ICD-10-CM

## 2021-07-15 DIAGNOSIS — Z85.46 HISTORY OF PROSTATE CANCER: ICD-10-CM

## 2021-07-15 DIAGNOSIS — I70.0 ATHEROSCLEROSIS OF AORTA: ICD-10-CM

## 2021-07-15 DIAGNOSIS — I48.21 PERMANENT ATRIAL FIBRILLATION: ICD-10-CM

## 2021-07-15 DIAGNOSIS — E11.69 HYPERLIPIDEMIA ASSOCIATED WITH TYPE 2 DIABETES MELLITUS: ICD-10-CM

## 2021-07-15 DIAGNOSIS — I50.42 CHRONIC COMBINED SYSTOLIC AND DIASTOLIC CONGESTIVE HEART FAILURE: ICD-10-CM

## 2021-07-15 DIAGNOSIS — D64.9 ANEMIA, UNSPECIFIED TYPE: ICD-10-CM

## 2021-07-15 DIAGNOSIS — R20.0 NUMBNESS AND TINGLING IN LEFT HAND: ICD-10-CM

## 2021-07-15 DIAGNOSIS — I10 HYPERTENSION, UNSPECIFIED TYPE: ICD-10-CM

## 2021-07-15 DIAGNOSIS — R20.2 NUMBNESS AND TINGLING IN LEFT HAND: ICD-10-CM

## 2021-07-15 PROCEDURE — 1159F PR MEDICATION LIST DOCUMENTED IN MEDICAL RECORD: ICD-10-PCS | Mod: S$GLB,,, | Performed by: INTERNAL MEDICINE

## 2021-07-15 PROCEDURE — 3078F PR MOST RECENT DIASTOLIC BLOOD PRESSURE < 80 MM HG: ICD-10-PCS | Mod: CPTII,S$GLB,, | Performed by: INTERNAL MEDICINE

## 2021-07-15 PROCEDURE — 99999 PR PBB SHADOW E&M-EST. PATIENT-LVL IV: CPT | Mod: PBBFAC,,, | Performed by: INTERNAL MEDICINE

## 2021-07-15 PROCEDURE — 99999 PR PBB SHADOW E&M-EST. PATIENT-LVL IV: ICD-10-PCS | Mod: PBBFAC,,, | Performed by: INTERNAL MEDICINE

## 2021-07-15 PROCEDURE — 3075F PR MOST RECENT SYSTOLIC BLOOD PRESS GE 130-139MM HG: ICD-10-PCS | Mod: CPTII,S$GLB,, | Performed by: INTERNAL MEDICINE

## 2021-07-15 PROCEDURE — 3078F PR MOST RECENT DIASTOLIC BLOOD PRESSURE < 80 MM HG: ICD-10-PCS | Mod: CPTII,S$GLB,, | Performed by: NURSE PRACTITIONER

## 2021-07-15 PROCEDURE — 1101F PR PT FALLS ASSESS DOC 0-1 FALLS W/OUT INJ PAST YR: ICD-10-PCS | Mod: CPTII,S$GLB,, | Performed by: NURSE PRACTITIONER

## 2021-07-15 PROCEDURE — 3075F PR MOST RECENT SYSTOLIC BLOOD PRESS GE 130-139MM HG: ICD-10-PCS | Mod: CPTII,S$GLB,, | Performed by: NURSE PRACTITIONER

## 2021-07-15 PROCEDURE — G0439 PR MEDICARE ANNUAL WELLNESS SUBSEQUENT VISIT: ICD-10-PCS | Mod: S$GLB,,, | Performed by: NURSE PRACTITIONER

## 2021-07-15 PROCEDURE — 99999 PR PBB SHADOW E&M-EST. PATIENT-LVL IV: ICD-10-PCS | Mod: PBBFAC,,, | Performed by: NURSE PRACTITIONER

## 2021-07-15 PROCEDURE — 3075F SYST BP GE 130 - 139MM HG: CPT | Mod: CPTII,S$GLB,, | Performed by: INTERNAL MEDICINE

## 2021-07-15 PROCEDURE — G0439 PPPS, SUBSEQ VISIT: HCPCS | Mod: S$GLB,,, | Performed by: NURSE PRACTITIONER

## 2021-07-15 PROCEDURE — 3078F DIAST BP <80 MM HG: CPT | Mod: CPTII,S$GLB,, | Performed by: NURSE PRACTITIONER

## 2021-07-15 PROCEDURE — 99214 OFFICE O/P EST MOD 30 MIN: CPT | Mod: S$GLB,,, | Performed by: INTERNAL MEDICINE

## 2021-07-15 PROCEDURE — 3075F SYST BP GE 130 - 139MM HG: CPT | Mod: CPTII,S$GLB,, | Performed by: NURSE PRACTITIONER

## 2021-07-15 PROCEDURE — 99214 PR OFFICE/OUTPT VISIT, EST, LEVL IV, 30-39 MIN: ICD-10-PCS | Mod: S$GLB,,, | Performed by: INTERNAL MEDICINE

## 2021-07-15 PROCEDURE — 1159F MED LIST DOCD IN RCRD: CPT | Mod: S$GLB,,, | Performed by: INTERNAL MEDICINE

## 2021-07-15 PROCEDURE — 99499 UNLISTED E&M SERVICE: CPT | Mod: HCNC,S$GLB,, | Performed by: NURSE PRACTITIONER

## 2021-07-15 PROCEDURE — 1101F PT FALLS ASSESS-DOCD LE1/YR: CPT | Mod: CPTII,S$GLB,, | Performed by: NURSE PRACTITIONER

## 2021-07-15 PROCEDURE — 99999 PR PBB SHADOW E&M-EST. PATIENT-LVL IV: CPT | Mod: PBBFAC,,, | Performed by: NURSE PRACTITIONER

## 2021-07-15 PROCEDURE — 3288F PR FALLS RISK ASSESSMENT DOCUMENTED: ICD-10-PCS | Mod: CPTII,S$GLB,, | Performed by: NURSE PRACTITIONER

## 2021-07-15 PROCEDURE — 3288F FALL RISK ASSESSMENT DOCD: CPT | Mod: CPTII,S$GLB,, | Performed by: NURSE PRACTITIONER

## 2021-07-15 PROCEDURE — 99499 RISK ADDL DX/OHS AUDIT: ICD-10-PCS | Mod: HCNC,S$GLB,, | Performed by: NURSE PRACTITIONER

## 2021-07-15 PROCEDURE — 3078F DIAST BP <80 MM HG: CPT | Mod: CPTII,S$GLB,, | Performed by: INTERNAL MEDICINE

## 2021-07-21 ENCOUNTER — OFFICE VISIT (OUTPATIENT)
Dept: GASTROENTEROLOGY | Facility: CLINIC | Age: 81
End: 2021-07-21
Payer: MEDICARE

## 2021-07-21 ENCOUNTER — OFFICE VISIT (OUTPATIENT)
Dept: HEMATOLOGY/ONCOLOGY | Facility: CLINIC | Age: 81
End: 2021-07-21
Payer: MEDICARE

## 2021-07-21 ENCOUNTER — LAB VISIT (OUTPATIENT)
Dept: LAB | Facility: HOSPITAL | Age: 81
End: 2021-07-21
Attending: PHYSICIAN ASSISTANT
Payer: MEDICARE

## 2021-07-21 VITALS
HEIGHT: 69 IN | DIASTOLIC BLOOD PRESSURE: 54 MMHG | WEIGHT: 198.44 LBS | SYSTOLIC BLOOD PRESSURE: 130 MMHG | HEART RATE: 89 BPM | BODY MASS INDEX: 29.39 KG/M2 | OXYGEN SATURATION: 98 %

## 2021-07-21 VITALS
HEART RATE: 79 BPM | BODY MASS INDEX: 29.59 KG/M2 | WEIGHT: 199.75 LBS | DIASTOLIC BLOOD PRESSURE: 66 MMHG | OXYGEN SATURATION: 99 % | SYSTOLIC BLOOD PRESSURE: 115 MMHG | HEIGHT: 69 IN | TEMPERATURE: 97 F

## 2021-07-21 DIAGNOSIS — G47.33 OBSTRUCTIVE SLEEP APNEA: ICD-10-CM

## 2021-07-21 DIAGNOSIS — D50.0 IRON DEFICIENCY ANEMIA DUE TO CHRONIC BLOOD LOSS: ICD-10-CM

## 2021-07-21 DIAGNOSIS — D63.1 ANEMIA ASSOCIATED WITH STAGE 4 CHRONIC RENAL FAILURE: Primary | ICD-10-CM

## 2021-07-21 DIAGNOSIS — N18.4 ANEMIA ASSOCIATED WITH STAGE 4 CHRONIC RENAL FAILURE: Primary | ICD-10-CM

## 2021-07-21 DIAGNOSIS — D64.9 SEVERE ANEMIA: ICD-10-CM

## 2021-07-21 DIAGNOSIS — I25.118 CORONARY ARTERY DISEASE OF NATIVE ARTERY OF NATIVE HEART WITH STABLE ANGINA PECTORIS: ICD-10-CM

## 2021-07-21 DIAGNOSIS — N18.4 STAGE 4 CHRONIC KIDNEY DISEASE: ICD-10-CM

## 2021-07-21 DIAGNOSIS — C61 ADENOCARCINOMA OF PROSTATE: ICD-10-CM

## 2021-07-21 LAB
BASOPHILS # BLD AUTO: 0.06 K/UL (ref 0–0.2)
BASOPHILS NFR BLD: 0.9 % (ref 0–1.9)
DIFFERENTIAL METHOD: ABNORMAL
EOSINOPHIL # BLD AUTO: 0.1 K/UL (ref 0–0.5)
EOSINOPHIL NFR BLD: 1.2 % (ref 0–8)
ERYTHROCYTE [DISTWIDTH] IN BLOOD BY AUTOMATED COUNT: 19.5 % (ref 11.5–14.5)
HCT VFR BLD AUTO: 27.3 % (ref 40–54)
HGB BLD-MCNC: 7.7 G/DL (ref 14–18)
IMM GRANULOCYTES # BLD AUTO: 0.03 K/UL (ref 0–0.04)
IMM GRANULOCYTES NFR BLD AUTO: 0.5 % (ref 0–0.5)
LYMPHOCYTES # BLD AUTO: 0.9 K/UL (ref 1–4.8)
LYMPHOCYTES NFR BLD: 13.7 % (ref 18–48)
MCH RBC QN AUTO: 23.3 PG (ref 27–31)
MCHC RBC AUTO-ENTMCNC: 28.2 G/DL (ref 32–36)
MCV RBC AUTO: 83 FL (ref 82–98)
MONOCYTES # BLD AUTO: 0.7 K/UL (ref 0.3–1)
MONOCYTES NFR BLD: 10.8 % (ref 4–15)
NEUTROPHILS # BLD AUTO: 4.8 K/UL (ref 1.8–7.7)
NEUTROPHILS NFR BLD: 72.9 % (ref 38–73)
NRBC BLD-RTO: 0 /100 WBC
PLATELET # BLD AUTO: 498 K/UL (ref 150–450)
PMV BLD AUTO: 9.7 FL (ref 9.2–12.9)
RBC # BLD AUTO: 3.31 M/UL (ref 4.6–6.2)
WBC # BLD AUTO: 6.51 K/UL (ref 3.9–12.7)

## 2021-07-21 PROCEDURE — 99999 PR PBB SHADOW E&M-EST. PATIENT-LVL IV: ICD-10-PCS | Mod: PBBFAC,,, | Performed by: PHYSICIAN ASSISTANT

## 2021-07-21 PROCEDURE — 99999 PR PBB SHADOW E&M-EST. PATIENT-LVL IV: CPT | Mod: PBBFAC,,, | Performed by: NURSE PRACTITIONER

## 2021-07-21 PROCEDURE — 99499 UNLISTED E&M SERVICE: CPT | Mod: HCNC,S$GLB,, | Performed by: NURSE PRACTITIONER

## 2021-07-21 PROCEDURE — 1126F AMNT PAIN NOTED NONE PRSNT: CPT | Mod: CPTII,S$GLB,, | Performed by: NURSE PRACTITIONER

## 2021-07-21 PROCEDURE — 3288F PR FALLS RISK ASSESSMENT DOCUMENTED: ICD-10-PCS | Mod: CPTII,S$GLB,, | Performed by: PHYSICIAN ASSISTANT

## 2021-07-21 PROCEDURE — 1126F PR PAIN SEVERITY QUANTIFIED, NO PAIN PRESENT: ICD-10-PCS | Mod: CPTII,S$GLB,, | Performed by: NURSE PRACTITIONER

## 2021-07-21 PROCEDURE — 99999 PR PBB SHADOW E&M-EST. PATIENT-LVL IV: CPT | Mod: PBBFAC,,, | Performed by: PHYSICIAN ASSISTANT

## 2021-07-21 PROCEDURE — 3074F SYST BP LT 130 MM HG: CPT | Mod: CPTII,S$GLB,, | Performed by: NURSE PRACTITIONER

## 2021-07-21 PROCEDURE — 3075F PR MOST RECENT SYSTOLIC BLOOD PRESS GE 130-139MM HG: ICD-10-PCS | Mod: CPTII,S$GLB,, | Performed by: PHYSICIAN ASSISTANT

## 2021-07-21 PROCEDURE — 99999 PR PBB SHADOW E&M-EST. PATIENT-LVL IV: ICD-10-PCS | Mod: PBBFAC,,, | Performed by: NURSE PRACTITIONER

## 2021-07-21 PROCEDURE — 99214 PR OFFICE/OUTPT VISIT, EST, LEVL IV, 30-39 MIN: ICD-10-PCS | Mod: S$GLB,,, | Performed by: PHYSICIAN ASSISTANT

## 2021-07-21 PROCEDURE — 99215 OFFICE O/P EST HI 40 MIN: CPT | Mod: S$GLB,,, | Performed by: NURSE PRACTITIONER

## 2021-07-21 PROCEDURE — 1101F PR PT FALLS ASSESS DOC 0-1 FALLS W/OUT INJ PAST YR: ICD-10-PCS | Mod: CPTII,S$GLB,, | Performed by: PHYSICIAN ASSISTANT

## 2021-07-21 PROCEDURE — 3078F PR MOST RECENT DIASTOLIC BLOOD PRESSURE < 80 MM HG: ICD-10-PCS | Mod: CPTII,S$GLB,, | Performed by: NURSE PRACTITIONER

## 2021-07-21 PROCEDURE — 1126F AMNT PAIN NOTED NONE PRSNT: CPT | Mod: CPTII,S$GLB,, | Performed by: PHYSICIAN ASSISTANT

## 2021-07-21 PROCEDURE — 1126F PR PAIN SEVERITY QUANTIFIED, NO PAIN PRESENT: ICD-10-PCS | Mod: CPTII,S$GLB,, | Performed by: PHYSICIAN ASSISTANT

## 2021-07-21 PROCEDURE — 99215 PR OFFICE/OUTPT VISIT, EST, LEVL V, 40-54 MIN: ICD-10-PCS | Mod: S$GLB,,, | Performed by: NURSE PRACTITIONER

## 2021-07-21 PROCEDURE — 1159F MED LIST DOCD IN RCRD: CPT | Mod: CPTII,S$GLB,, | Performed by: NURSE PRACTITIONER

## 2021-07-21 PROCEDURE — 1101F PR PT FALLS ASSESS DOC 0-1 FALLS W/OUT INJ PAST YR: ICD-10-PCS | Mod: CPTII,S$GLB,, | Performed by: NURSE PRACTITIONER

## 2021-07-21 PROCEDURE — 99214 OFFICE O/P EST MOD 30 MIN: CPT | Mod: S$GLB,,, | Performed by: PHYSICIAN ASSISTANT

## 2021-07-21 PROCEDURE — 1101F PT FALLS ASSESS-DOCD LE1/YR: CPT | Mod: CPTII,S$GLB,, | Performed by: NURSE PRACTITIONER

## 2021-07-21 PROCEDURE — 3288F FALL RISK ASSESSMENT DOCD: CPT | Mod: CPTII,S$GLB,, | Performed by: PHYSICIAN ASSISTANT

## 2021-07-21 PROCEDURE — 3074F PR MOST RECENT SYSTOLIC BLOOD PRESSURE < 130 MM HG: ICD-10-PCS | Mod: CPTII,S$GLB,, | Performed by: NURSE PRACTITIONER

## 2021-07-21 PROCEDURE — 3078F DIAST BP <80 MM HG: CPT | Mod: CPTII,S$GLB,, | Performed by: NURSE PRACTITIONER

## 2021-07-21 PROCEDURE — 3075F SYST BP GE 130 - 139MM HG: CPT | Mod: CPTII,S$GLB,, | Performed by: PHYSICIAN ASSISTANT

## 2021-07-21 PROCEDURE — 3288F FALL RISK ASSESSMENT DOCD: CPT | Mod: CPTII,S$GLB,, | Performed by: NURSE PRACTITIONER

## 2021-07-21 PROCEDURE — 1159F PR MEDICATION LIST DOCUMENTED IN MEDICAL RECORD: ICD-10-PCS | Mod: CPTII,S$GLB,, | Performed by: NURSE PRACTITIONER

## 2021-07-21 PROCEDURE — 99499 RISK ADDL DX/OHS AUDIT: ICD-10-PCS | Mod: HCNC,S$GLB,, | Performed by: NURSE PRACTITIONER

## 2021-07-21 PROCEDURE — 3078F DIAST BP <80 MM HG: CPT | Mod: CPTII,S$GLB,, | Performed by: PHYSICIAN ASSISTANT

## 2021-07-21 PROCEDURE — 85025 COMPLETE CBC W/AUTO DIFF WBC: CPT | Performed by: PHYSICIAN ASSISTANT

## 2021-07-21 PROCEDURE — 3078F PR MOST RECENT DIASTOLIC BLOOD PRESSURE < 80 MM HG: ICD-10-PCS | Mod: CPTII,S$GLB,, | Performed by: PHYSICIAN ASSISTANT

## 2021-07-21 PROCEDURE — 1101F PT FALLS ASSESS-DOCD LE1/YR: CPT | Mod: CPTII,S$GLB,, | Performed by: PHYSICIAN ASSISTANT

## 2021-07-21 PROCEDURE — 36415 COLL VENOUS BLD VENIPUNCTURE: CPT | Performed by: PHYSICIAN ASSISTANT

## 2021-07-21 PROCEDURE — 3288F PR FALLS RISK ASSESSMENT DOCUMENTED: ICD-10-PCS | Mod: CPTII,S$GLB,, | Performed by: NURSE PRACTITIONER

## 2021-07-21 RX ORDER — SODIUM CHLORIDE 0.9 % (FLUSH) 0.9 %
10 SYRINGE (ML) INJECTION
Status: CANCELLED | OUTPATIENT
Start: 2021-08-06

## 2021-07-21 RX ORDER — SODIUM CHLORIDE 0.9 % (FLUSH) 0.9 %
10 SYRINGE (ML) INJECTION
Status: CANCELLED | OUTPATIENT
Start: 2021-07-21

## 2021-07-21 RX ORDER — HEPARIN 100 UNIT/ML
500 SYRINGE INTRAVENOUS
Status: CANCELLED | OUTPATIENT
Start: 2021-07-21

## 2021-07-21 RX ORDER — HEPARIN 100 UNIT/ML
500 SYRINGE INTRAVENOUS
Status: CANCELLED | OUTPATIENT
Start: 2021-08-06

## 2021-07-22 ENCOUNTER — OFFICE VISIT (OUTPATIENT)
Dept: PAIN MEDICINE | Facility: CLINIC | Age: 81
End: 2021-07-22
Payer: MEDICARE

## 2021-07-22 ENCOUNTER — TELEPHONE (OUTPATIENT)
Dept: GASTROENTEROLOGY | Facility: CLINIC | Age: 81
End: 2021-07-22
Payer: MEDICARE

## 2021-07-22 VITALS
SYSTOLIC BLOOD PRESSURE: 126 MMHG | DIASTOLIC BLOOD PRESSURE: 65 MMHG | HEART RATE: 77 BPM | BODY MASS INDEX: 29.4 KG/M2 | HEIGHT: 69 IN | WEIGHT: 198.5 LBS

## 2021-07-22 DIAGNOSIS — M54.59 LUMBAR FACET JOINT PAIN: ICD-10-CM

## 2021-07-22 DIAGNOSIS — M51.36 DDD (DEGENERATIVE DISC DISEASE), LUMBAR: ICD-10-CM

## 2021-07-22 DIAGNOSIS — M47.816 LUMBAR SPONDYLOSIS: Primary | ICD-10-CM

## 2021-07-22 DIAGNOSIS — D64.9 SEVERE ANEMIA: Primary | ICD-10-CM

## 2021-07-22 PROCEDURE — 3288F PR FALLS RISK ASSESSMENT DOCUMENTED: ICD-10-PCS | Mod: CPTII,S$GLB,, | Performed by: PHYSICIAN ASSISTANT

## 2021-07-22 PROCEDURE — 3288F FALL RISK ASSESSMENT DOCD: CPT | Mod: CPTII,S$GLB,, | Performed by: PHYSICIAN ASSISTANT

## 2021-07-22 PROCEDURE — 3078F PR MOST RECENT DIASTOLIC BLOOD PRESSURE < 80 MM HG: ICD-10-PCS | Mod: CPTII,S$GLB,, | Performed by: PHYSICIAN ASSISTANT

## 2021-07-22 PROCEDURE — 3074F SYST BP LT 130 MM HG: CPT | Mod: CPTII,S$GLB,, | Performed by: PHYSICIAN ASSISTANT

## 2021-07-22 PROCEDURE — 1126F AMNT PAIN NOTED NONE PRSNT: CPT | Mod: CPTII,S$GLB,, | Performed by: PHYSICIAN ASSISTANT

## 2021-07-22 PROCEDURE — 3074F PR MOST RECENT SYSTOLIC BLOOD PRESSURE < 130 MM HG: ICD-10-PCS | Mod: CPTII,S$GLB,, | Performed by: PHYSICIAN ASSISTANT

## 2021-07-22 PROCEDURE — 99214 PR OFFICE/OUTPT VISIT, EST, LEVL IV, 30-39 MIN: ICD-10-PCS | Mod: S$GLB,,, | Performed by: PHYSICIAN ASSISTANT

## 2021-07-22 PROCEDURE — 99999 PR PBB SHADOW E&M-EST. PATIENT-LVL IV: CPT | Mod: PBBFAC,,, | Performed by: PHYSICIAN ASSISTANT

## 2021-07-22 PROCEDURE — 99999 PR PBB SHADOW E&M-EST. PATIENT-LVL IV: ICD-10-PCS | Mod: PBBFAC,,, | Performed by: PHYSICIAN ASSISTANT

## 2021-07-22 PROCEDURE — 3078F DIAST BP <80 MM HG: CPT | Mod: CPTII,S$GLB,, | Performed by: PHYSICIAN ASSISTANT

## 2021-07-22 PROCEDURE — 1159F MED LIST DOCD IN RCRD: CPT | Mod: CPTII,S$GLB,, | Performed by: PHYSICIAN ASSISTANT

## 2021-07-22 PROCEDURE — 1159F PR MEDICATION LIST DOCUMENTED IN MEDICAL RECORD: ICD-10-PCS | Mod: CPTII,S$GLB,, | Performed by: PHYSICIAN ASSISTANT

## 2021-07-22 PROCEDURE — 1101F PT FALLS ASSESS-DOCD LE1/YR: CPT | Mod: CPTII,S$GLB,, | Performed by: PHYSICIAN ASSISTANT

## 2021-07-22 PROCEDURE — 1126F PR PAIN SEVERITY QUANTIFIED, NO PAIN PRESENT: ICD-10-PCS | Mod: CPTII,S$GLB,, | Performed by: PHYSICIAN ASSISTANT

## 2021-07-22 PROCEDURE — 99214 OFFICE O/P EST MOD 30 MIN: CPT | Mod: S$GLB,,, | Performed by: PHYSICIAN ASSISTANT

## 2021-07-22 PROCEDURE — 1101F PR PT FALLS ASSESS DOC 0-1 FALLS W/OUT INJ PAST YR: ICD-10-PCS | Mod: CPTII,S$GLB,, | Performed by: PHYSICIAN ASSISTANT

## 2021-07-23 ENCOUNTER — HOSPITAL ENCOUNTER (OUTPATIENT)
Dept: RADIOLOGY | Facility: HOSPITAL | Age: 81
Discharge: HOME | End: 2021-07-23
Attending: PHYSICIAN ASSISTANT
Payer: MEDICARE

## 2021-07-23 DIAGNOSIS — D64.9 SEVERE ANEMIA: ICD-10-CM

## 2021-07-23 PROCEDURE — 74177 CT ABD & PELVIS W/CONTRAST: CPT | Mod: 26,,, | Performed by: RADIOLOGY

## 2021-07-23 PROCEDURE — A9698 NON-RAD CONTRAST MATERIALNOC: HCPCS | Performed by: PHYSICIAN ASSISTANT

## 2021-07-23 PROCEDURE — 25500020 PHARM REV CODE 255: Performed by: PHYSICIAN ASSISTANT

## 2021-07-23 PROCEDURE — 74177 CT ENTEROGRAPHY ABD_PELVIS WITH CONTRAST: ICD-10-PCS | Mod: 26,,, | Performed by: RADIOLOGY

## 2021-07-23 PROCEDURE — 74177 CT ABD & PELVIS W/CONTRAST: CPT | Mod: TC

## 2021-07-23 RX ADMIN — BARIUM SULFATE 1350 ML: 1 SUSPENSION ORAL at 10:07

## 2021-07-23 RX ADMIN — IOHEXOL 120 ML: 350 INJECTION, SOLUTION INTRAVENOUS at 11:07

## 2021-07-24 ENCOUNTER — PATIENT MESSAGE (OUTPATIENT)
Dept: PAIN MEDICINE | Facility: CLINIC | Age: 81
End: 2021-07-24

## 2021-07-24 ENCOUNTER — CLINICAL SUPPORT (OUTPATIENT)
Dept: CARDIOLOGY | Facility: HOSPITAL | Age: 81
End: 2021-07-24
Payer: MEDICARE

## 2021-07-24 DIAGNOSIS — Z95.0 PRESENCE OF CARDIAC PACEMAKER: ICD-10-CM

## 2021-07-24 PROCEDURE — 93294 REM INTERROG EVL PM/LDLS PM: CPT | Mod: S$GLB,,, | Performed by: INTERNAL MEDICINE

## 2021-07-24 PROCEDURE — 93294 CARDIAC DEVICE CHECK - REMOTE: ICD-10-PCS | Mod: S$GLB,,, | Performed by: INTERNAL MEDICINE

## 2021-07-24 PROCEDURE — 93296 REM INTERROG EVL PM/IDS: CPT | Performed by: INTERNAL MEDICINE

## 2021-08-02 ENCOUNTER — PATIENT OUTREACH (OUTPATIENT)
Dept: ADMINISTRATIVE | Facility: HOSPITAL | Age: 81
End: 2021-08-02

## 2021-08-05 ENCOUNTER — INFUSION (OUTPATIENT)
Dept: INFUSION THERAPY | Facility: HOSPITAL | Age: 81
End: 2021-08-05
Attending: INTERNAL MEDICINE
Payer: MEDICARE

## 2021-08-05 VITALS
RESPIRATION RATE: 18 BRPM | TEMPERATURE: 97 F | DIASTOLIC BLOOD PRESSURE: 69 MMHG | SYSTOLIC BLOOD PRESSURE: 118 MMHG | HEART RATE: 75 BPM | OXYGEN SATURATION: 99 %

## 2021-08-05 DIAGNOSIS — D50.0 IRON DEFICIENCY ANEMIA DUE TO CHRONIC BLOOD LOSS: Primary | ICD-10-CM

## 2021-08-05 PROCEDURE — 96365 THER/PROPH/DIAG IV INF INIT: CPT

## 2021-08-05 PROCEDURE — 63600175 PHARM REV CODE 636 W HCPCS: Mod: JG | Performed by: NURSE PRACTITIONER

## 2021-08-05 PROCEDURE — 25000003 PHARM REV CODE 250: Performed by: NURSE PRACTITIONER

## 2021-08-05 RX ADMIN — FERRIC CARBOXYMALTOSE INJECTION 750 MG: 50 INJECTION, SOLUTION INTRAVENOUS at 11:08

## 2021-08-12 ENCOUNTER — INFUSION (OUTPATIENT)
Dept: INFUSION THERAPY | Facility: HOSPITAL | Age: 81
End: 2021-08-12
Attending: INTERNAL MEDICINE
Payer: MEDICARE

## 2021-08-12 VITALS
DIASTOLIC BLOOD PRESSURE: 57 MMHG | RESPIRATION RATE: 16 BRPM | TEMPERATURE: 98 F | HEART RATE: 69 BPM | OXYGEN SATURATION: 98 % | SYSTOLIC BLOOD PRESSURE: 103 MMHG

## 2021-08-12 DIAGNOSIS — D50.0 IRON DEFICIENCY ANEMIA DUE TO CHRONIC BLOOD LOSS: Primary | ICD-10-CM

## 2021-08-12 PROCEDURE — 96365 THER/PROPH/DIAG IV INF INIT: CPT

## 2021-08-12 PROCEDURE — 25000003 PHARM REV CODE 250: Performed by: NURSE PRACTITIONER

## 2021-08-12 PROCEDURE — 63600175 PHARM REV CODE 636 W HCPCS: Mod: JG | Performed by: NURSE PRACTITIONER

## 2021-08-12 RX ADMIN — FERRIC CARBOXYMALTOSE INJECTION 750 MG: 50 INJECTION, SOLUTION INTRAVENOUS at 11:08

## 2021-08-13 ENCOUNTER — PATIENT MESSAGE (OUTPATIENT)
Dept: FAMILY MEDICINE | Facility: CLINIC | Age: 81
End: 2021-08-13

## 2021-08-13 DIAGNOSIS — G47.61 PERIODIC LIMB MOVEMENT DISORDER (PLMD): ICD-10-CM

## 2021-08-17 ENCOUNTER — OFFICE VISIT (OUTPATIENT)
Dept: OPHTHALMOLOGY | Facility: CLINIC | Age: 81
End: 2021-08-17
Payer: MEDICARE

## 2021-08-17 DIAGNOSIS — E11.9 DIABETES MELLITUS TYPE 2 WITHOUT RETINOPATHY: Primary | ICD-10-CM

## 2021-08-17 DIAGNOSIS — H52.4 BILATERAL PRESBYOPIA: ICD-10-CM

## 2021-08-17 DIAGNOSIS — E11.36 DIABETIC CATARACT: ICD-10-CM

## 2021-08-17 DIAGNOSIS — H04.123 DRY EYES, BILATERAL: ICD-10-CM

## 2021-08-17 DIAGNOSIS — H52.03 HYPEROPIA, BILATERAL: ICD-10-CM

## 2021-08-17 PROCEDURE — 2023F DILAT RTA XM W/O RTNOPTHY: CPT | Mod: CPTII,S$GLB,, | Performed by: OPTOMETRIST

## 2021-08-17 PROCEDURE — 92014 COMPRE OPH EXAM EST PT 1/>: CPT | Mod: S$GLB,,, | Performed by: OPTOMETRIST

## 2021-08-17 PROCEDURE — 2023F PR DILATED RETINAL EXAM W/O EVID OF RETINOPATHY: ICD-10-PCS | Mod: CPTII,S$GLB,, | Performed by: OPTOMETRIST

## 2021-08-17 PROCEDURE — 99999 PR PBB SHADOW E&M-EST. PATIENT-LVL III: ICD-10-PCS | Mod: PBBFAC,,, | Performed by: OPTOMETRIST

## 2021-08-17 PROCEDURE — 1159F PR MEDICATION LIST DOCUMENTED IN MEDICAL RECORD: ICD-10-PCS | Mod: CPTII,S$GLB,, | Performed by: OPTOMETRIST

## 2021-08-17 PROCEDURE — 92015 PR REFRACTION: ICD-10-PCS | Mod: S$GLB,,, | Performed by: OPTOMETRIST

## 2021-08-17 PROCEDURE — 1159F MED LIST DOCD IN RCRD: CPT | Mod: CPTII,S$GLB,, | Performed by: OPTOMETRIST

## 2021-08-17 PROCEDURE — 99999 PR PBB SHADOW E&M-EST. PATIENT-LVL III: CPT | Mod: PBBFAC,,, | Performed by: OPTOMETRIST

## 2021-08-17 PROCEDURE — 92014 PR EYE EXAM, EST PATIENT,COMPREHESV: ICD-10-PCS | Mod: S$GLB,,, | Performed by: OPTOMETRIST

## 2021-08-17 PROCEDURE — 92015 DETERMINE REFRACTIVE STATE: CPT | Mod: S$GLB,,, | Performed by: OPTOMETRIST

## 2021-08-17 RX ORDER — CLONAZEPAM 1 MG/1
1 TABLET ORAL NIGHTLY PRN
Qty: 90 TABLET | Refills: 0 | Status: SHIPPED | OUTPATIENT
Start: 2021-08-17 | End: 2021-12-13 | Stop reason: SDUPTHER

## 2021-08-19 ENCOUNTER — PATIENT MESSAGE (OUTPATIENT)
Dept: CARDIOLOGY | Facility: CLINIC | Age: 81
End: 2021-08-19

## 2021-08-19 DIAGNOSIS — E78.2 MIXED HYPERLIPIDEMIA: ICD-10-CM

## 2021-08-19 DIAGNOSIS — I25.118 CORONARY ARTERY DISEASE OF NATIVE ARTERY OF NATIVE HEART WITH STABLE ANGINA PECTORIS: ICD-10-CM

## 2021-08-19 DIAGNOSIS — I48.21 PERMANENT ATRIAL FIBRILLATION: ICD-10-CM

## 2021-08-19 DIAGNOSIS — I10 ESSENTIAL HYPERTENSION: Primary | ICD-10-CM

## 2021-08-19 RX ORDER — LOSARTAN POTASSIUM 50 MG/1
50 TABLET ORAL DAILY
Qty: 90 TABLET | Refills: 3 | Status: SHIPPED | OUTPATIENT
Start: 2021-08-19 | End: 2022-01-01 | Stop reason: SDUPTHER

## 2021-08-21 ENCOUNTER — PATIENT MESSAGE (OUTPATIENT)
Dept: FAMILY MEDICINE | Facility: CLINIC | Age: 81
End: 2021-08-21

## 2021-08-24 ENCOUNTER — LAB VISIT (OUTPATIENT)
Dept: LAB | Facility: HOSPITAL | Age: 81
End: 2021-08-24
Attending: FAMILY MEDICINE
Payer: MEDICARE

## 2021-08-24 DIAGNOSIS — D63.1 ANEMIA ASSOCIATED WITH STAGE 4 CHRONIC RENAL FAILURE: ICD-10-CM

## 2021-08-24 DIAGNOSIS — N18.4 ANEMIA ASSOCIATED WITH STAGE 4 CHRONIC RENAL FAILURE: ICD-10-CM

## 2021-08-24 LAB
ACANTHOCYTES BLD QL SMEAR: PRESENT
ALBUMIN SERPL BCP-MCNC: 4 G/DL (ref 3.5–5.2)
ALP SERPL-CCNC: 72 U/L (ref 55–135)
ALT SERPL W/O P-5'-P-CCNC: 10 U/L (ref 10–44)
ANION GAP SERPL CALC-SCNC: 14 MMOL/L (ref 8–16)
ANISOCYTOSIS BLD QL SMEAR: SLIGHT
AST SERPL-CCNC: 18 U/L (ref 10–40)
BASOPHILS # BLD AUTO: 0.06 K/UL (ref 0–0.2)
BASOPHILS NFR BLD: 1 % (ref 0–1.9)
BILIRUB SERPL-MCNC: 0.3 MG/DL (ref 0.1–1)
BUN SERPL-MCNC: 36 MG/DL (ref 8–23)
CALCIUM SERPL-MCNC: 9.2 MG/DL (ref 8.7–10.5)
CHLORIDE SERPL-SCNC: 105 MMOL/L (ref 95–110)
CO2 SERPL-SCNC: 19 MMOL/L (ref 23–29)
CREAT SERPL-MCNC: 2.2 MG/DL (ref 0.5–1.4)
DACRYOCYTES BLD QL SMEAR: ABNORMAL
DIFFERENTIAL METHOD: ABNORMAL
EOSINOPHIL # BLD AUTO: 0.2 K/UL (ref 0–0.5)
EOSINOPHIL NFR BLD: 2.8 % (ref 0–8)
ERYTHROCYTE [DISTWIDTH] IN BLOOD BY AUTOMATED COUNT: ABNORMAL % (ref 11.5–14.5)
EST. GFR  (AFRICAN AMERICAN): 32 ML/MIN/1.73 M^2
EST. GFR  (NON AFRICAN AMERICAN): 27 ML/MIN/1.73 M^2
FERRITIN SERPL-MCNC: 467 NG/ML (ref 20–300)
GLUCOSE SERPL-MCNC: 164 MG/DL (ref 70–110)
HCT VFR BLD AUTO: 28.4 % (ref 40–54)
HGB BLD-MCNC: 8.1 G/DL (ref 14–18)
HYPOCHROMIA BLD QL SMEAR: ABNORMAL
IMM GRANULOCYTES # BLD AUTO: 0.03 K/UL (ref 0–0.04)
IMM GRANULOCYTES NFR BLD AUTO: 0.5 % (ref 0–0.5)
IRON SERPL-MCNC: 181 UG/DL (ref 45–160)
LYMPHOCYTES # BLD AUTO: 1.1 K/UL (ref 1–4.8)
LYMPHOCYTES NFR BLD: 19.7 % (ref 18–48)
MCH RBC QN AUTO: 26.6 PG (ref 27–31)
MCHC RBC AUTO-ENTMCNC: 28.5 G/DL (ref 32–36)
MCV RBC AUTO: 93 FL (ref 82–98)
MONOCYTES # BLD AUTO: 0.5 K/UL (ref 0.3–1)
MONOCYTES NFR BLD: 8.7 % (ref 4–15)
NEUTROPHILS # BLD AUTO: 3.9 K/UL (ref 1.8–7.7)
NEUTROPHILS NFR BLD: 67.3 % (ref 38–73)
NRBC BLD-RTO: 0 /100 WBC
OVALOCYTES BLD QL SMEAR: ABNORMAL
PLATELET # BLD AUTO: 528 K/UL (ref 150–450)
PMV BLD AUTO: 9.3 FL (ref 9.2–12.9)
POLYCHROMASIA BLD QL SMEAR: ABNORMAL
POTASSIUM SERPL-SCNC: 4.6 MMOL/L (ref 3.5–5.1)
PROT SERPL-MCNC: 7.4 G/DL (ref 6–8.4)
RBC # BLD AUTO: 3.04 M/UL (ref 4.6–6.2)
SATURATED IRON: 55 % (ref 20–50)
SODIUM SERPL-SCNC: 138 MMOL/L (ref 136–145)
STOMATOCYTES BLD QL SMEAR: PRESENT
TARGETS BLD QL SMEAR: ABNORMAL
TOTAL IRON BINDING CAPACITY: 327 UG/DL (ref 250–450)
TRANSFERRIN SERPL-MCNC: 221 MG/DL (ref 200–375)
WBC # BLD AUTO: 5.74 K/UL (ref 3.9–12.7)

## 2021-08-24 PROCEDURE — 84466 ASSAY OF TRANSFERRIN: CPT | Performed by: NURSE PRACTITIONER

## 2021-08-24 PROCEDURE — 83540 ASSAY OF IRON: CPT | Performed by: NURSE PRACTITIONER

## 2021-08-24 PROCEDURE — 36415 COLL VENOUS BLD VENIPUNCTURE: CPT | Mod: PO | Performed by: NURSE PRACTITIONER

## 2021-08-24 PROCEDURE — 85025 COMPLETE CBC W/AUTO DIFF WBC: CPT | Performed by: NURSE PRACTITIONER

## 2021-08-24 PROCEDURE — 80053 COMPREHEN METABOLIC PANEL: CPT | Performed by: NURSE PRACTITIONER

## 2021-08-24 PROCEDURE — 82728 ASSAY OF FERRITIN: CPT | Performed by: NURSE PRACTITIONER

## 2021-08-25 ENCOUNTER — PATIENT MESSAGE (OUTPATIENT)
Dept: PAIN MEDICINE | Facility: CLINIC | Age: 81
End: 2021-08-25

## 2021-09-01 ENCOUNTER — TELEPHONE (OUTPATIENT)
Dept: NEPHROLOGY | Facility: CLINIC | Age: 81
End: 2021-09-01

## 2021-09-01 ENCOUNTER — OFFICE VISIT (OUTPATIENT)
Dept: NEPHROLOGY | Facility: CLINIC | Age: 81
End: 2021-09-01
Payer: MEDICARE

## 2021-09-01 VITALS
SYSTOLIC BLOOD PRESSURE: 120 MMHG | RESPIRATION RATE: 20 BRPM | DIASTOLIC BLOOD PRESSURE: 50 MMHG | BODY MASS INDEX: 29.88 KG/M2 | HEART RATE: 80 BPM | WEIGHT: 201.75 LBS | HEIGHT: 69 IN

## 2021-09-01 DIAGNOSIS — I48.0 PAROXYSMAL ATRIAL FIBRILLATION: ICD-10-CM

## 2021-09-01 DIAGNOSIS — I10 ESSENTIAL HYPERTENSION: ICD-10-CM

## 2021-09-01 DIAGNOSIS — N18.30 STAGE 3 CHRONIC KIDNEY DISEASE, UNSPECIFIED WHETHER STAGE 3A OR 3B CKD: Primary | ICD-10-CM

## 2021-09-01 PROCEDURE — 99215 OFFICE O/P EST HI 40 MIN: CPT | Mod: S$GLB,,, | Performed by: INTERNAL MEDICINE

## 2021-09-01 PROCEDURE — 3074F PR MOST RECENT SYSTOLIC BLOOD PRESSURE < 130 MM HG: ICD-10-PCS | Mod: CPTII,S$GLB,, | Performed by: INTERNAL MEDICINE

## 2021-09-01 PROCEDURE — 3288F PR FALLS RISK ASSESSMENT DOCUMENTED: ICD-10-PCS | Mod: CPTII,S$GLB,, | Performed by: INTERNAL MEDICINE

## 2021-09-01 PROCEDURE — 1125F PR PAIN SEVERITY QUANTIFIED, PAIN PRESENT: ICD-10-PCS | Mod: CPTII,S$GLB,, | Performed by: INTERNAL MEDICINE

## 2021-09-01 PROCEDURE — 3288F FALL RISK ASSESSMENT DOCD: CPT | Mod: CPTII,S$GLB,, | Performed by: INTERNAL MEDICINE

## 2021-09-01 PROCEDURE — 99999 PR PBB SHADOW E&M-EST. PATIENT-LVL IV: CPT | Mod: PBBFAC,,, | Performed by: INTERNAL MEDICINE

## 2021-09-01 PROCEDURE — 1125F AMNT PAIN NOTED PAIN PRSNT: CPT | Mod: CPTII,S$GLB,, | Performed by: INTERNAL MEDICINE

## 2021-09-01 PROCEDURE — 1101F PT FALLS ASSESS-DOCD LE1/YR: CPT | Mod: CPTII,S$GLB,, | Performed by: INTERNAL MEDICINE

## 2021-09-01 PROCEDURE — 3074F SYST BP LT 130 MM HG: CPT | Mod: CPTII,S$GLB,, | Performed by: INTERNAL MEDICINE

## 2021-09-01 PROCEDURE — 1101F PR PT FALLS ASSESS DOC 0-1 FALLS W/OUT INJ PAST YR: ICD-10-PCS | Mod: CPTII,S$GLB,, | Performed by: INTERNAL MEDICINE

## 2021-09-01 PROCEDURE — 3078F DIAST BP <80 MM HG: CPT | Mod: CPTII,S$GLB,, | Performed by: INTERNAL MEDICINE

## 2021-09-01 PROCEDURE — 99999 PR PBB SHADOW E&M-EST. PATIENT-LVL IV: ICD-10-PCS | Mod: PBBFAC,,, | Performed by: INTERNAL MEDICINE

## 2021-09-01 PROCEDURE — 1159F PR MEDICATION LIST DOCUMENTED IN MEDICAL RECORD: ICD-10-PCS | Mod: CPTII,S$GLB,, | Performed by: INTERNAL MEDICINE

## 2021-09-01 PROCEDURE — 3078F PR MOST RECENT DIASTOLIC BLOOD PRESSURE < 80 MM HG: ICD-10-PCS | Mod: CPTII,S$GLB,, | Performed by: INTERNAL MEDICINE

## 2021-09-01 PROCEDURE — 99215 PR OFFICE/OUTPT VISIT, EST, LEVL V, 40-54 MIN: ICD-10-PCS | Mod: S$GLB,,, | Performed by: INTERNAL MEDICINE

## 2021-09-01 PROCEDURE — 1159F MED LIST DOCD IN RCRD: CPT | Mod: CPTII,S$GLB,, | Performed by: INTERNAL MEDICINE

## 2021-09-01 RX ORDER — FUROSEMIDE 20 MG/1
20 TABLET ORAL 2 TIMES DAILY
Qty: 60 TABLET | Refills: 11 | Status: SHIPPED | OUTPATIENT
Start: 2021-09-01 | End: 2021-10-28 | Stop reason: SDUPTHER

## 2021-09-01 RX ORDER — FERROUS SULFATE 325(65) MG
325 TABLET ORAL
COMMUNITY
End: 2022-04-26

## 2021-09-04 ENCOUNTER — PATIENT MESSAGE (OUTPATIENT)
Dept: CARDIOLOGY | Facility: CLINIC | Age: 81
End: 2021-09-04

## 2021-09-07 ENCOUNTER — PATIENT MESSAGE (OUTPATIENT)
Dept: HEMATOLOGY/ONCOLOGY | Facility: CLINIC | Age: 81
End: 2021-09-07

## 2021-09-07 DIAGNOSIS — N18.4 ANEMIA ASSOCIATED WITH STAGE 4 CHRONIC RENAL FAILURE: Primary | ICD-10-CM

## 2021-09-07 DIAGNOSIS — D63.1 ANEMIA ASSOCIATED WITH STAGE 4 CHRONIC RENAL FAILURE: Primary | ICD-10-CM

## 2021-09-07 RX ORDER — ATORVASTATIN CALCIUM 80 MG/1
TABLET, FILM COATED ORAL
Qty: 90 TABLET | Refills: 1 | Status: SHIPPED | OUTPATIENT
Start: 2021-09-07 | End: 2021-12-27 | Stop reason: SDUPTHER

## 2021-09-13 ENCOUNTER — OFFICE VISIT (OUTPATIENT)
Dept: FAMILY MEDICINE | Facility: CLINIC | Age: 81
End: 2021-09-13
Payer: MEDICARE

## 2021-09-13 VITALS
SYSTOLIC BLOOD PRESSURE: 112 MMHG | RESPIRATION RATE: 18 BRPM | HEART RATE: 87 BPM | BODY MASS INDEX: 30.38 KG/M2 | OXYGEN SATURATION: 96 % | DIASTOLIC BLOOD PRESSURE: 60 MMHG | HEIGHT: 69 IN | TEMPERATURE: 97 F | WEIGHT: 205.13 LBS

## 2021-09-13 DIAGNOSIS — I25.10 CORONARY ARTERY DISEASE INVOLVING NATIVE CORONARY ARTERY OF NATIVE HEART WITHOUT ANGINA PECTORIS: ICD-10-CM

## 2021-09-13 DIAGNOSIS — D64.9 SEVERE ANEMIA: Primary | ICD-10-CM

## 2021-09-13 DIAGNOSIS — E66.9 DIABETES MELLITUS TYPE 2 IN OBESE: ICD-10-CM

## 2021-09-13 DIAGNOSIS — N18.4 STAGE 4 CHRONIC KIDNEY DISEASE: ICD-10-CM

## 2021-09-13 DIAGNOSIS — I50.42 CHRONIC COMBINED SYSTOLIC (CONGESTIVE) AND DIASTOLIC (CONGESTIVE) HEART FAILURE: ICD-10-CM

## 2021-09-13 DIAGNOSIS — E11.69 DIABETES MELLITUS TYPE 2 IN OBESE: ICD-10-CM

## 2021-09-13 DIAGNOSIS — G47.33 OBSTRUCTIVE SLEEP APNEA: ICD-10-CM

## 2021-09-13 PROCEDURE — 99214 OFFICE O/P EST MOD 30 MIN: CPT | Mod: HCNC,S$GLB,, | Performed by: FAMILY MEDICINE

## 2021-09-13 PROCEDURE — 3078F PR MOST RECENT DIASTOLIC BLOOD PRESSURE < 80 MM HG: ICD-10-PCS | Mod: HCNC,CPTII,S$GLB, | Performed by: FAMILY MEDICINE

## 2021-09-13 PROCEDURE — 99214 PR OFFICE/OUTPT VISIT, EST, LEVL IV, 30-39 MIN: ICD-10-PCS | Mod: HCNC,S$GLB,, | Performed by: FAMILY MEDICINE

## 2021-09-13 PROCEDURE — 3074F SYST BP LT 130 MM HG: CPT | Mod: HCNC,CPTII,S$GLB, | Performed by: FAMILY MEDICINE

## 2021-09-13 PROCEDURE — 1125F AMNT PAIN NOTED PAIN PRSNT: CPT | Mod: HCNC,CPTII,S$GLB, | Performed by: FAMILY MEDICINE

## 2021-09-13 PROCEDURE — 1159F PR MEDICATION LIST DOCUMENTED IN MEDICAL RECORD: ICD-10-PCS | Mod: HCNC,CPTII,S$GLB, | Performed by: FAMILY MEDICINE

## 2021-09-13 PROCEDURE — 3078F DIAST BP <80 MM HG: CPT | Mod: HCNC,CPTII,S$GLB, | Performed by: FAMILY MEDICINE

## 2021-09-13 PROCEDURE — 1159F MED LIST DOCD IN RCRD: CPT | Mod: HCNC,CPTII,S$GLB, | Performed by: FAMILY MEDICINE

## 2021-09-13 PROCEDURE — 99999 PR PBB SHADOW E&M-EST. PATIENT-LVL IV: CPT | Mod: PBBFAC,,, | Performed by: FAMILY MEDICINE

## 2021-09-13 PROCEDURE — 1125F PR PAIN SEVERITY QUANTIFIED, PAIN PRESENT: ICD-10-PCS | Mod: HCNC,CPTII,S$GLB, | Performed by: FAMILY MEDICINE

## 2021-09-13 PROCEDURE — 99999 PR PBB SHADOW E&M-EST. PATIENT-LVL IV: ICD-10-PCS | Mod: PBBFAC,,, | Performed by: FAMILY MEDICINE

## 2021-09-13 PROCEDURE — 3074F PR MOST RECENT SYSTOLIC BLOOD PRESSURE < 130 MM HG: ICD-10-PCS | Mod: HCNC,CPTII,S$GLB, | Performed by: FAMILY MEDICINE

## 2021-09-15 ENCOUNTER — PATIENT MESSAGE (OUTPATIENT)
Dept: GASTROENTEROLOGY | Facility: CLINIC | Age: 81
End: 2021-09-15

## 2021-09-16 ENCOUNTER — INFUSION (OUTPATIENT)
Dept: INFUSION THERAPY | Facility: HOSPITAL | Age: 81
End: 2021-09-16
Attending: INTERNAL MEDICINE
Payer: MEDICARE

## 2021-09-16 ENCOUNTER — OFFICE VISIT (OUTPATIENT)
Dept: HEMATOLOGY/ONCOLOGY | Facility: CLINIC | Age: 81
End: 2021-09-16
Payer: MEDICARE

## 2021-09-16 VITALS
SYSTOLIC BLOOD PRESSURE: 124 MMHG | OXYGEN SATURATION: 99 % | RESPIRATION RATE: 18 BRPM | DIASTOLIC BLOOD PRESSURE: 67 MMHG | TEMPERATURE: 98 F | HEART RATE: 85 BPM

## 2021-09-16 VITALS
DIASTOLIC BLOOD PRESSURE: 63 MMHG | TEMPERATURE: 97 F | BODY MASS INDEX: 29.76 KG/M2 | WEIGHT: 207.88 LBS | SYSTOLIC BLOOD PRESSURE: 115 MMHG | OXYGEN SATURATION: 97 % | HEIGHT: 70 IN | HEART RATE: 81 BPM

## 2021-09-16 DIAGNOSIS — D50.0 IRON DEFICIENCY ANEMIA DUE TO CHRONIC BLOOD LOSS: ICD-10-CM

## 2021-09-16 DIAGNOSIS — D63.1 ANEMIA ASSOCIATED WITH STAGE 4 CHRONIC RENAL FAILURE: Primary | ICD-10-CM

## 2021-09-16 DIAGNOSIS — N18.4 ANEMIA ASSOCIATED WITH STAGE 4 CHRONIC RENAL FAILURE: Primary | ICD-10-CM

## 2021-09-16 PROCEDURE — 1159F PR MEDICATION LIST DOCUMENTED IN MEDICAL RECORD: ICD-10-PCS | Mod: HCNC,CPTII,S$GLB, | Performed by: NURSE PRACTITIONER

## 2021-09-16 PROCEDURE — 1101F PT FALLS ASSESS-DOCD LE1/YR: CPT | Mod: HCNC,CPTII,S$GLB, | Performed by: NURSE PRACTITIONER

## 2021-09-16 PROCEDURE — 3288F FALL RISK ASSESSMENT DOCD: CPT | Mod: HCNC,CPTII,S$GLB, | Performed by: NURSE PRACTITIONER

## 2021-09-16 PROCEDURE — 3074F SYST BP LT 130 MM HG: CPT | Mod: HCNC,CPTII,S$GLB, | Performed by: NURSE PRACTITIONER

## 2021-09-16 PROCEDURE — 1126F AMNT PAIN NOTED NONE PRSNT: CPT | Mod: HCNC,CPTII,S$GLB, | Performed by: NURSE PRACTITIONER

## 2021-09-16 PROCEDURE — 1126F PR PAIN SEVERITY QUANTIFIED, NO PAIN PRESENT: ICD-10-PCS | Mod: HCNC,CPTII,S$GLB, | Performed by: NURSE PRACTITIONER

## 2021-09-16 PROCEDURE — 99214 PR OFFICE/OUTPT VISIT, EST, LEVL IV, 30-39 MIN: ICD-10-PCS | Mod: 25,HCNC,S$GLB, | Performed by: NURSE PRACTITIONER

## 2021-09-16 PROCEDURE — 1159F MED LIST DOCD IN RCRD: CPT | Mod: HCNC,CPTII,S$GLB, | Performed by: NURSE PRACTITIONER

## 2021-09-16 PROCEDURE — 99999 PR PBB SHADOW E&M-EST. PATIENT-LVL IV: CPT | Mod: PBBFAC,HCNC,, | Performed by: NURSE PRACTITIONER

## 2021-09-16 PROCEDURE — 3078F PR MOST RECENT DIASTOLIC BLOOD PRESSURE < 80 MM HG: ICD-10-PCS | Mod: HCNC,CPTII,S$GLB, | Performed by: NURSE PRACTITIONER

## 2021-09-16 PROCEDURE — 1160F RVW MEDS BY RX/DR IN RCRD: CPT | Mod: HCNC,CPTII,S$GLB, | Performed by: NURSE PRACTITIONER

## 2021-09-16 PROCEDURE — 1160F PR REVIEW ALL MEDS BY PRESCRIBER/CLIN PHARMACIST DOCUMENTED: ICD-10-PCS | Mod: HCNC,CPTII,S$GLB, | Performed by: NURSE PRACTITIONER

## 2021-09-16 PROCEDURE — 3288F PR FALLS RISK ASSESSMENT DOCUMENTED: ICD-10-PCS | Mod: HCNC,CPTII,S$GLB, | Performed by: NURSE PRACTITIONER

## 2021-09-16 PROCEDURE — 99999 PR PBB SHADOW E&M-EST. PATIENT-LVL IV: ICD-10-PCS | Mod: PBBFAC,HCNC,, | Performed by: NURSE PRACTITIONER

## 2021-09-16 PROCEDURE — 63600175 PHARM REV CODE 636 W HCPCS: Mod: TB,HCNC,EC | Performed by: NURSE PRACTITIONER

## 2021-09-16 PROCEDURE — 96372 THER/PROPH/DIAG INJ SC/IM: CPT | Mod: HCNC

## 2021-09-16 PROCEDURE — 3078F DIAST BP <80 MM HG: CPT | Mod: HCNC,CPTII,S$GLB, | Performed by: NURSE PRACTITIONER

## 2021-09-16 PROCEDURE — 99214 OFFICE O/P EST MOD 30 MIN: CPT | Mod: 25,HCNC,S$GLB, | Performed by: NURSE PRACTITIONER

## 2021-09-16 PROCEDURE — 3074F PR MOST RECENT SYSTOLIC BLOOD PRESSURE < 130 MM HG: ICD-10-PCS | Mod: HCNC,CPTII,S$GLB, | Performed by: NURSE PRACTITIONER

## 2021-09-16 PROCEDURE — 1101F PR PT FALLS ASSESS DOC 0-1 FALLS W/OUT INJ PAST YR: ICD-10-PCS | Mod: HCNC,CPTII,S$GLB, | Performed by: NURSE PRACTITIONER

## 2021-09-16 RX ADMIN — EPOETIN ALFA-EPBX 10000 UNITS: 10000 INJECTION, SOLUTION INTRAVENOUS; SUBCUTANEOUS at 03:09

## 2021-09-22 ENCOUNTER — OFFICE VISIT (OUTPATIENT)
Dept: PAIN MEDICINE | Facility: CLINIC | Age: 81
End: 2021-09-22
Payer: MEDICARE

## 2021-09-22 VITALS
DIASTOLIC BLOOD PRESSURE: 66 MMHG | SYSTOLIC BLOOD PRESSURE: 129 MMHG | WEIGHT: 211.06 LBS | HEART RATE: 83 BPM | HEIGHT: 70 IN | BODY MASS INDEX: 30.22 KG/M2

## 2021-09-22 DIAGNOSIS — M54.9 DORSALGIA, UNSPECIFIED: ICD-10-CM

## 2021-09-22 DIAGNOSIS — M48.062 SPINAL STENOSIS OF LUMBAR REGION WITH NEUROGENIC CLAUDICATION: ICD-10-CM

## 2021-09-22 DIAGNOSIS — M54.16 LUMBAR RADICULOPATHY, CHRONIC: ICD-10-CM

## 2021-09-22 DIAGNOSIS — M96.1 FAILED BACK SURGICAL SYNDROME: Primary | ICD-10-CM

## 2021-09-22 PROCEDURE — 3074F PR MOST RECENT SYSTOLIC BLOOD PRESSURE < 130 MM HG: ICD-10-PCS | Mod: HCNC,CPTII,S$GLB, | Performed by: ANESTHESIOLOGY

## 2021-09-22 PROCEDURE — 1159F MED LIST DOCD IN RCRD: CPT | Mod: HCNC,CPTII,S$GLB, | Performed by: ANESTHESIOLOGY

## 2021-09-22 PROCEDURE — 1125F AMNT PAIN NOTED PAIN PRSNT: CPT | Mod: HCNC,CPTII,S$GLB, | Performed by: ANESTHESIOLOGY

## 2021-09-22 PROCEDURE — 1159F PR MEDICATION LIST DOCUMENTED IN MEDICAL RECORD: ICD-10-PCS | Mod: HCNC,CPTII,S$GLB, | Performed by: ANESTHESIOLOGY

## 2021-09-22 PROCEDURE — 99999 PR PBB SHADOW E&M-EST. PATIENT-LVL IV: CPT | Mod: PBBFAC,HCNC,, | Performed by: ANESTHESIOLOGY

## 2021-09-22 PROCEDURE — 3078F PR MOST RECENT DIASTOLIC BLOOD PRESSURE < 80 MM HG: ICD-10-PCS | Mod: HCNC,CPTII,S$GLB, | Performed by: ANESTHESIOLOGY

## 2021-09-22 PROCEDURE — 3288F PR FALLS RISK ASSESSMENT DOCUMENTED: ICD-10-PCS | Mod: HCNC,CPTII,S$GLB, | Performed by: ANESTHESIOLOGY

## 2021-09-22 PROCEDURE — 3078F DIAST BP <80 MM HG: CPT | Mod: HCNC,CPTII,S$GLB, | Performed by: ANESTHESIOLOGY

## 2021-09-22 PROCEDURE — 99214 OFFICE O/P EST MOD 30 MIN: CPT | Mod: HCNC,S$GLB,, | Performed by: ANESTHESIOLOGY

## 2021-09-22 PROCEDURE — 1125F PR PAIN SEVERITY QUANTIFIED, PAIN PRESENT: ICD-10-PCS | Mod: HCNC,CPTII,S$GLB, | Performed by: ANESTHESIOLOGY

## 2021-09-22 PROCEDURE — 99214 PR OFFICE/OUTPT VISIT, EST, LEVL IV, 30-39 MIN: ICD-10-PCS | Mod: HCNC,S$GLB,, | Performed by: ANESTHESIOLOGY

## 2021-09-22 PROCEDURE — 1160F PR REVIEW ALL MEDS BY PRESCRIBER/CLIN PHARMACIST DOCUMENTED: ICD-10-PCS | Mod: HCNC,CPTII,S$GLB, | Performed by: ANESTHESIOLOGY

## 2021-09-22 PROCEDURE — 1101F PT FALLS ASSESS-DOCD LE1/YR: CPT | Mod: HCNC,CPTII,S$GLB, | Performed by: ANESTHESIOLOGY

## 2021-09-22 PROCEDURE — 1160F RVW MEDS BY RX/DR IN RCRD: CPT | Mod: HCNC,CPTII,S$GLB, | Performed by: ANESTHESIOLOGY

## 2021-09-22 PROCEDURE — 3288F FALL RISK ASSESSMENT DOCD: CPT | Mod: HCNC,CPTII,S$GLB, | Performed by: ANESTHESIOLOGY

## 2021-09-22 PROCEDURE — 99999 PR PBB SHADOW E&M-EST. PATIENT-LVL IV: ICD-10-PCS | Mod: PBBFAC,HCNC,, | Performed by: ANESTHESIOLOGY

## 2021-09-22 PROCEDURE — 1101F PR PT FALLS ASSESS DOC 0-1 FALLS W/OUT INJ PAST YR: ICD-10-PCS | Mod: HCNC,CPTII,S$GLB, | Performed by: ANESTHESIOLOGY

## 2021-09-22 PROCEDURE — 3074F SYST BP LT 130 MM HG: CPT | Mod: HCNC,CPTII,S$GLB, | Performed by: ANESTHESIOLOGY

## 2021-09-22 RX ORDER — PREGABALIN 75 MG/1
CAPSULE ORAL
Qty: 147 CAPSULE | Refills: 0 | Status: SHIPPED | OUTPATIENT
Start: 2021-09-22 | End: 2021-11-17 | Stop reason: SDUPTHER

## 2021-09-23 ENCOUNTER — PATIENT MESSAGE (OUTPATIENT)
Dept: PAIN MEDICINE | Facility: CLINIC | Age: 81
End: 2021-09-23

## 2021-09-30 ENCOUNTER — INFUSION (OUTPATIENT)
Dept: INFUSION THERAPY | Facility: HOSPITAL | Age: 81
End: 2021-09-30
Attending: INTERNAL MEDICINE
Payer: MEDICARE

## 2021-09-30 ENCOUNTER — PATIENT MESSAGE (OUTPATIENT)
Dept: HEMATOLOGY/ONCOLOGY | Facility: CLINIC | Age: 81
End: 2021-09-30

## 2021-09-30 VITALS
SYSTOLIC BLOOD PRESSURE: 137 MMHG | HEART RATE: 85 BPM | OXYGEN SATURATION: 98 % | TEMPERATURE: 98 F | DIASTOLIC BLOOD PRESSURE: 79 MMHG | RESPIRATION RATE: 18 BRPM

## 2021-09-30 DIAGNOSIS — N18.4 ANEMIA ASSOCIATED WITH STAGE 4 CHRONIC RENAL FAILURE: Primary | ICD-10-CM

## 2021-09-30 DIAGNOSIS — D63.1 ANEMIA ASSOCIATED WITH STAGE 4 CHRONIC RENAL FAILURE: Primary | ICD-10-CM

## 2021-09-30 PROCEDURE — 63600175 PHARM REV CODE 636 W HCPCS: Mod: TB,HCNC,EC | Performed by: NURSE PRACTITIONER

## 2021-09-30 PROCEDURE — 96372 THER/PROPH/DIAG INJ SC/IM: CPT | Mod: HCNC

## 2021-09-30 RX ADMIN — EPOETIN ALFA-EPBX 10000 UNITS: 10000 INJECTION, SOLUTION INTRAVENOUS; SUBCUTANEOUS at 01:09

## 2021-10-01 ENCOUNTER — HOSPITAL ENCOUNTER (OUTPATIENT)
Dept: RADIOLOGY | Facility: HOSPITAL | Age: 81
Discharge: HOME | End: 2021-10-01
Attending: ANESTHESIOLOGY
Payer: MEDICARE

## 2021-10-01 ENCOUNTER — IMMUNIZATION (OUTPATIENT)
Dept: PRIMARY CARE CLINIC | Facility: CLINIC | Age: 81
End: 2021-10-01
Payer: MEDICARE

## 2021-10-01 DIAGNOSIS — M54.9 DORSALGIA, UNSPECIFIED: ICD-10-CM

## 2021-10-01 DIAGNOSIS — Z23 NEED FOR VACCINATION: Primary | ICD-10-CM

## 2021-10-01 PROCEDURE — 91300 COVID-19, MRNA, LNP-S, PF, 30 MCG/0.3 ML DOSE VACCINE: CPT | Mod: PBBFAC | Performed by: FAMILY MEDICINE

## 2021-10-01 PROCEDURE — 0003A COVID-19, MRNA, LNP-S, PF, 30 MCG/0.3 ML DOSE VACCINE: CPT | Mod: CV19,PBBFAC | Performed by: FAMILY MEDICINE

## 2021-10-01 PROCEDURE — G1004 CDSM NDSC: HCPCS

## 2021-10-03 ENCOUNTER — PATIENT MESSAGE (OUTPATIENT)
Dept: FAMILY MEDICINE | Facility: CLINIC | Age: 81
End: 2021-10-03

## 2021-10-05 ENCOUNTER — TELEPHONE (OUTPATIENT)
Dept: PAIN MEDICINE | Facility: CLINIC | Age: 81
End: 2021-10-05

## 2021-10-06 ENCOUNTER — TELEPHONE (OUTPATIENT)
Dept: PAIN MEDICINE | Facility: CLINIC | Age: 81
End: 2021-10-06

## 2021-10-07 ENCOUNTER — PATIENT MESSAGE (OUTPATIENT)
Dept: PAIN MEDICINE | Facility: CLINIC | Age: 81
End: 2021-10-07

## 2021-10-07 ENCOUNTER — TELEPHONE (OUTPATIENT)
Dept: PAIN MEDICINE | Facility: CLINIC | Age: 81
End: 2021-10-07

## 2021-10-08 ENCOUNTER — IMMUNIZATION (OUTPATIENT)
Dept: FAMILY MEDICINE | Facility: CLINIC | Age: 81
End: 2021-10-08
Payer: MEDICARE

## 2021-10-08 PROCEDURE — G0008 FLU VACCINE - QUADRIVALENT - ADJUVANTED: ICD-10-PCS | Mod: HCNC,S$GLB,, | Performed by: FAMILY MEDICINE

## 2021-10-08 PROCEDURE — G0008 ADMIN INFLUENZA VIRUS VAC: HCPCS | Mod: HCNC,S$GLB,, | Performed by: FAMILY MEDICINE

## 2021-10-08 PROCEDURE — 90694 VACC AIIV4 NO PRSRV 0.5ML IM: CPT | Mod: HCNC,S$GLB,, | Performed by: FAMILY MEDICINE

## 2021-10-08 PROCEDURE — 90694 FLU VACCINE - QUADRIVALENT - ADJUVANTED: ICD-10-PCS | Mod: HCNC,S$GLB,, | Performed by: FAMILY MEDICINE

## 2021-10-14 ENCOUNTER — OFFICE VISIT (OUTPATIENT)
Dept: URGENT CARE | Facility: CLINIC | Age: 81
End: 2021-10-14
Payer: MEDICARE

## 2021-10-14 VITALS
OXYGEN SATURATION: 97 % | BODY MASS INDEX: 30.26 KG/M2 | HEART RATE: 77 BPM | SYSTOLIC BLOOD PRESSURE: 118 MMHG | WEIGHT: 210.88 LBS | DIASTOLIC BLOOD PRESSURE: 69 MMHG | TEMPERATURE: 98 F

## 2021-10-14 DIAGNOSIS — J02.9 SORE THROAT: Primary | ICD-10-CM

## 2021-10-14 LAB
CTP QC/QA: YES
MOLECULAR STREP A: NEGATIVE

## 2021-10-14 PROCEDURE — 1101F PT FALLS ASSESS-DOCD LE1/YR: CPT | Mod: HCNC,CPTII,S$GLB, | Performed by: PHYSICIAN ASSISTANT

## 2021-10-14 PROCEDURE — 1160F PR REVIEW ALL MEDS BY PRESCRIBER/CLIN PHARMACIST DOCUMENTED: ICD-10-PCS | Mod: HCNC,CPTII,S$GLB, | Performed by: PHYSICIAN ASSISTANT

## 2021-10-14 PROCEDURE — 1126F PR PAIN SEVERITY QUANTIFIED, NO PAIN PRESENT: ICD-10-PCS | Mod: HCNC,CPTII,S$GLB, | Performed by: PHYSICIAN ASSISTANT

## 2021-10-14 PROCEDURE — 99214 PR OFFICE/OUTPT VISIT, EST, LEVL IV, 30-39 MIN: ICD-10-PCS | Mod: HCNC,S$GLB,, | Performed by: PHYSICIAN ASSISTANT

## 2021-10-14 PROCEDURE — 87651 STREP A DNA AMP PROBE: CPT | Mod: QW,HCNC,S$GLB, | Performed by: PHYSICIAN ASSISTANT

## 2021-10-14 PROCEDURE — 3078F DIAST BP <80 MM HG: CPT | Mod: HCNC,CPTII,S$GLB, | Performed by: PHYSICIAN ASSISTANT

## 2021-10-14 PROCEDURE — 1159F MED LIST DOCD IN RCRD: CPT | Mod: HCNC,CPTII,S$GLB, | Performed by: PHYSICIAN ASSISTANT

## 2021-10-14 PROCEDURE — 3074F PR MOST RECENT SYSTOLIC BLOOD PRESSURE < 130 MM HG: ICD-10-PCS | Mod: HCNC,CPTII,S$GLB, | Performed by: PHYSICIAN ASSISTANT

## 2021-10-14 PROCEDURE — 99999 PR PBB SHADOW E&M-EST. PATIENT-LVL IV: CPT | Mod: PBBFAC,HCNC,, | Performed by: PHYSICIAN ASSISTANT

## 2021-10-14 PROCEDURE — 3074F SYST BP LT 130 MM HG: CPT | Mod: HCNC,CPTII,S$GLB, | Performed by: PHYSICIAN ASSISTANT

## 2021-10-14 PROCEDURE — 3288F PR FALLS RISK ASSESSMENT DOCUMENTED: ICD-10-PCS | Mod: HCNC,CPTII,S$GLB, | Performed by: PHYSICIAN ASSISTANT

## 2021-10-14 PROCEDURE — 1101F PR PT FALLS ASSESS DOC 0-1 FALLS W/OUT INJ PAST YR: ICD-10-PCS | Mod: HCNC,CPTII,S$GLB, | Performed by: PHYSICIAN ASSISTANT

## 2021-10-14 PROCEDURE — 1160F RVW MEDS BY RX/DR IN RCRD: CPT | Mod: HCNC,CPTII,S$GLB, | Performed by: PHYSICIAN ASSISTANT

## 2021-10-14 PROCEDURE — 1126F AMNT PAIN NOTED NONE PRSNT: CPT | Mod: HCNC,CPTII,S$GLB, | Performed by: PHYSICIAN ASSISTANT

## 2021-10-14 PROCEDURE — 3288F FALL RISK ASSESSMENT DOCD: CPT | Mod: HCNC,CPTII,S$GLB, | Performed by: PHYSICIAN ASSISTANT

## 2021-10-14 PROCEDURE — 87651 POCT STREP A MOLECULAR: ICD-10-PCS | Mod: QW,HCNC,S$GLB, | Performed by: PHYSICIAN ASSISTANT

## 2021-10-14 PROCEDURE — 99214 OFFICE O/P EST MOD 30 MIN: CPT | Mod: HCNC,S$GLB,, | Performed by: PHYSICIAN ASSISTANT

## 2021-10-14 PROCEDURE — 3078F PR MOST RECENT DIASTOLIC BLOOD PRESSURE < 80 MM HG: ICD-10-PCS | Mod: HCNC,CPTII,S$GLB, | Performed by: PHYSICIAN ASSISTANT

## 2021-10-14 PROCEDURE — 99999 PR PBB SHADOW E&M-EST. PATIENT-LVL IV: ICD-10-PCS | Mod: PBBFAC,HCNC,, | Performed by: PHYSICIAN ASSISTANT

## 2021-10-14 PROCEDURE — 1159F PR MEDICATION LIST DOCUMENTED IN MEDICAL RECORD: ICD-10-PCS | Mod: HCNC,CPTII,S$GLB, | Performed by: PHYSICIAN ASSISTANT

## 2021-10-15 ENCOUNTER — INFUSION (OUTPATIENT)
Dept: INFUSION THERAPY | Facility: HOSPITAL | Age: 81
End: 2021-10-15
Attending: INTERNAL MEDICINE
Payer: MEDICARE

## 2021-10-15 ENCOUNTER — OFFICE VISIT (OUTPATIENT)
Dept: HEMATOLOGY/ONCOLOGY | Facility: CLINIC | Age: 81
End: 2021-10-15
Payer: MEDICARE

## 2021-10-15 VITALS
HEIGHT: 70 IN | TEMPERATURE: 97 F | DIASTOLIC BLOOD PRESSURE: 76 MMHG | OXYGEN SATURATION: 97 % | HEART RATE: 87 BPM | BODY MASS INDEX: 30.14 KG/M2 | SYSTOLIC BLOOD PRESSURE: 138 MMHG | WEIGHT: 210.56 LBS

## 2021-10-15 VITALS
OXYGEN SATURATION: 97 % | DIASTOLIC BLOOD PRESSURE: 77 MMHG | TEMPERATURE: 98 F | SYSTOLIC BLOOD PRESSURE: 144 MMHG | RESPIRATION RATE: 18 BRPM | HEART RATE: 87 BPM

## 2021-10-15 DIAGNOSIS — N18.4 ANEMIA ASSOCIATED WITH STAGE 4 CHRONIC RENAL FAILURE: Primary | ICD-10-CM

## 2021-10-15 DIAGNOSIS — D50.0 IRON DEFICIENCY ANEMIA DUE TO CHRONIC BLOOD LOSS: Primary | ICD-10-CM

## 2021-10-15 DIAGNOSIS — D63.1 ANEMIA ASSOCIATED WITH STAGE 4 CHRONIC RENAL FAILURE: Primary | ICD-10-CM

## 2021-10-15 PROCEDURE — 1160F PR REVIEW ALL MEDS BY PRESCRIBER/CLIN PHARMACIST DOCUMENTED: ICD-10-PCS | Mod: HCNC,CPTII,S$GLB, | Performed by: NURSE PRACTITIONER

## 2021-10-15 PROCEDURE — 99214 OFFICE O/P EST MOD 30 MIN: CPT | Mod: 25,HCNC,S$GLB, | Performed by: NURSE PRACTITIONER

## 2021-10-15 PROCEDURE — 3078F PR MOST RECENT DIASTOLIC BLOOD PRESSURE < 80 MM HG: ICD-10-PCS | Mod: HCNC,CPTII,S$GLB, | Performed by: NURSE PRACTITIONER

## 2021-10-15 PROCEDURE — 99214 PR OFFICE/OUTPT VISIT, EST, LEVL IV, 30-39 MIN: ICD-10-PCS | Mod: 25,HCNC,S$GLB, | Performed by: NURSE PRACTITIONER

## 2021-10-15 PROCEDURE — 1101F PT FALLS ASSESS-DOCD LE1/YR: CPT | Mod: HCNC,CPTII,S$GLB, | Performed by: NURSE PRACTITIONER

## 2021-10-15 PROCEDURE — 3288F FALL RISK ASSESSMENT DOCD: CPT | Mod: HCNC,CPTII,S$GLB, | Performed by: NURSE PRACTITIONER

## 2021-10-15 PROCEDURE — 1126F AMNT PAIN NOTED NONE PRSNT: CPT | Mod: HCNC,CPTII,S$GLB, | Performed by: NURSE PRACTITIONER

## 2021-10-15 PROCEDURE — 3078F DIAST BP <80 MM HG: CPT | Mod: HCNC,CPTII,S$GLB, | Performed by: NURSE PRACTITIONER

## 2021-10-15 PROCEDURE — 99999 PR PBB SHADOW E&M-EST. PATIENT-LVL IV: CPT | Mod: PBBFAC,HCNC,, | Performed by: NURSE PRACTITIONER

## 2021-10-15 PROCEDURE — 1126F PR PAIN SEVERITY QUANTIFIED, NO PAIN PRESENT: ICD-10-PCS | Mod: HCNC,CPTII,S$GLB, | Performed by: NURSE PRACTITIONER

## 2021-10-15 PROCEDURE — 3288F PR FALLS RISK ASSESSMENT DOCUMENTED: ICD-10-PCS | Mod: HCNC,CPTII,S$GLB, | Performed by: NURSE PRACTITIONER

## 2021-10-15 PROCEDURE — 63600175 PHARM REV CODE 636 W HCPCS: Mod: JG,HCNC | Performed by: NURSE PRACTITIONER

## 2021-10-15 PROCEDURE — 96372 THER/PROPH/DIAG INJ SC/IM: CPT | Mod: HCNC

## 2021-10-15 PROCEDURE — 1101F PR PT FALLS ASSESS DOC 0-1 FALLS W/OUT INJ PAST YR: ICD-10-PCS | Mod: HCNC,CPTII,S$GLB, | Performed by: NURSE PRACTITIONER

## 2021-10-15 PROCEDURE — 1159F PR MEDICATION LIST DOCUMENTED IN MEDICAL RECORD: ICD-10-PCS | Mod: HCNC,CPTII,S$GLB, | Performed by: NURSE PRACTITIONER

## 2021-10-15 PROCEDURE — 1159F MED LIST DOCD IN RCRD: CPT | Mod: HCNC,CPTII,S$GLB, | Performed by: NURSE PRACTITIONER

## 2021-10-15 PROCEDURE — 1160F RVW MEDS BY RX/DR IN RCRD: CPT | Mod: HCNC,CPTII,S$GLB, | Performed by: NURSE PRACTITIONER

## 2021-10-15 PROCEDURE — 99999 PR PBB SHADOW E&M-EST. PATIENT-LVL IV: ICD-10-PCS | Mod: PBBFAC,HCNC,, | Performed by: NURSE PRACTITIONER

## 2021-10-15 PROCEDURE — 3075F SYST BP GE 130 - 139MM HG: CPT | Mod: HCNC,CPTII,S$GLB, | Performed by: NURSE PRACTITIONER

## 2021-10-15 PROCEDURE — 3075F PR MOST RECENT SYSTOLIC BLOOD PRESS GE 130-139MM HG: ICD-10-PCS | Mod: HCNC,CPTII,S$GLB, | Performed by: NURSE PRACTITIONER

## 2021-10-15 RX ADMIN — EPOETIN ALFA-EPBX 10000 UNITS: 10000 INJECTION, SOLUTION INTRAVENOUS; SUBCUTANEOUS at 03:10

## 2021-10-18 ENCOUNTER — HOSPITAL ENCOUNTER (OUTPATIENT)
Dept: RADIOLOGY | Facility: HOSPITAL | Age: 81
Discharge: HOME | End: 2021-10-18
Attending: PHYSICIAN ASSISTANT
Payer: MEDICARE

## 2021-10-18 ENCOUNTER — OFFICE VISIT (OUTPATIENT)
Dept: URGENT CARE | Facility: CLINIC | Age: 81
End: 2021-10-18
Payer: MEDICARE

## 2021-10-18 VITALS
WEIGHT: 206.38 LBS | OXYGEN SATURATION: 97 % | HEART RATE: 69 BPM | SYSTOLIC BLOOD PRESSURE: 110 MMHG | DIASTOLIC BLOOD PRESSURE: 60 MMHG | TEMPERATURE: 98 F | BODY MASS INDEX: 30.04 KG/M2

## 2021-10-18 DIAGNOSIS — R05.9 COUGH: ICD-10-CM

## 2021-10-18 DIAGNOSIS — J20.9 ACUTE BRONCHITIS, UNSPECIFIED ORGANISM: Primary | ICD-10-CM

## 2021-10-18 LAB
CTP QC/QA: YES
SARS-COV-2 RDRP RESP QL NAA+PROBE: NEGATIVE

## 2021-10-18 PROCEDURE — 1126F PR PAIN SEVERITY QUANTIFIED, NO PAIN PRESENT: ICD-10-PCS | Mod: HCNC,CPTII,S$GLB, | Performed by: PHYSICIAN ASSISTANT

## 2021-10-18 PROCEDURE — 99999 PR PBB SHADOW E&M-EST. PATIENT-LVL V: CPT | Mod: PBBFAC,HCNC,, | Performed by: PHYSICIAN ASSISTANT

## 2021-10-18 PROCEDURE — 71046 X-RAY EXAM CHEST 2 VIEWS: CPT | Mod: TC,HCNC,PO

## 2021-10-18 PROCEDURE — 94640 PR INHAL RX, AIRWAY OBST/DX SPUTUM INDUCT: ICD-10-PCS | Mod: HCNC,S$GLB,, | Performed by: PHYSICIAN ASSISTANT

## 2021-10-18 PROCEDURE — 1126F AMNT PAIN NOTED NONE PRSNT: CPT | Mod: HCNC,CPTII,S$GLB, | Performed by: PHYSICIAN ASSISTANT

## 2021-10-18 PROCEDURE — 3288F FALL RISK ASSESSMENT DOCD: CPT | Mod: HCNC,CPTII,S$GLB, | Performed by: PHYSICIAN ASSISTANT

## 2021-10-18 PROCEDURE — 3074F SYST BP LT 130 MM HG: CPT | Mod: HCNC,CPTII,S$GLB, | Performed by: PHYSICIAN ASSISTANT

## 2021-10-18 PROCEDURE — 1159F PR MEDICATION LIST DOCUMENTED IN MEDICAL RECORD: ICD-10-PCS | Mod: HCNC,CPTII,S$GLB, | Performed by: PHYSICIAN ASSISTANT

## 2021-10-18 PROCEDURE — 99999 PR PBB SHADOW E&M-EST. PATIENT-LVL V: ICD-10-PCS | Mod: PBBFAC,HCNC,, | Performed by: PHYSICIAN ASSISTANT

## 2021-10-18 PROCEDURE — 3288F PR FALLS RISK ASSESSMENT DOCUMENTED: ICD-10-PCS | Mod: HCNC,CPTII,S$GLB, | Performed by: PHYSICIAN ASSISTANT

## 2021-10-18 PROCEDURE — 1159F MED LIST DOCD IN RCRD: CPT | Mod: HCNC,CPTII,S$GLB, | Performed by: PHYSICIAN ASSISTANT

## 2021-10-18 PROCEDURE — U0002 COVID-19 LAB TEST NON-CDC: HCPCS | Mod: QW,HCNC,S$GLB, | Performed by: PHYSICIAN ASSISTANT

## 2021-10-18 PROCEDURE — 3078F DIAST BP <80 MM HG: CPT | Mod: HCNC,CPTII,S$GLB, | Performed by: PHYSICIAN ASSISTANT

## 2021-10-18 PROCEDURE — 3078F PR MOST RECENT DIASTOLIC BLOOD PRESSURE < 80 MM HG: ICD-10-PCS | Mod: HCNC,CPTII,S$GLB, | Performed by: PHYSICIAN ASSISTANT

## 2021-10-18 PROCEDURE — 1101F PR PT FALLS ASSESS DOC 0-1 FALLS W/OUT INJ PAST YR: ICD-10-PCS | Mod: HCNC,CPTII,S$GLB, | Performed by: PHYSICIAN ASSISTANT

## 2021-10-18 PROCEDURE — 3074F PR MOST RECENT SYSTOLIC BLOOD PRESSURE < 130 MM HG: ICD-10-PCS | Mod: HCNC,CPTII,S$GLB, | Performed by: PHYSICIAN ASSISTANT

## 2021-10-18 PROCEDURE — 1101F PT FALLS ASSESS-DOCD LE1/YR: CPT | Mod: HCNC,CPTII,S$GLB, | Performed by: PHYSICIAN ASSISTANT

## 2021-10-18 PROCEDURE — 71046 X-RAY EXAM CHEST 2 VIEWS: CPT | Mod: 26,HCNC,, | Performed by: RADIOLOGY

## 2021-10-18 PROCEDURE — 99214 PR OFFICE/OUTPT VISIT, EST, LEVL IV, 30-39 MIN: ICD-10-PCS | Mod: HCNC,25,CS,S$GLB | Performed by: PHYSICIAN ASSISTANT

## 2021-10-18 PROCEDURE — 71046 XR CHEST PA AND LATERAL: ICD-10-PCS | Mod: 26,HCNC,, | Performed by: RADIOLOGY

## 2021-10-18 PROCEDURE — 99214 OFFICE O/P EST MOD 30 MIN: CPT | Mod: HCNC,25,CS,S$GLB | Performed by: PHYSICIAN ASSISTANT

## 2021-10-18 PROCEDURE — 94640 AIRWAY INHALATION TREATMENT: CPT | Mod: HCNC,S$GLB,, | Performed by: PHYSICIAN ASSISTANT

## 2021-10-18 PROCEDURE — U0002: ICD-10-PCS | Mod: QW,HCNC,S$GLB, | Performed by: PHYSICIAN ASSISTANT

## 2021-10-18 RX ORDER — LEVALBUTEROL INHALATION SOLUTION 1.25 MG/3ML
1.25 SOLUTION RESPIRATORY (INHALATION)
Status: COMPLETED | OUTPATIENT
Start: 2021-10-18 | End: 2021-10-18

## 2021-10-18 RX ORDER — AZITHROMYCIN 250 MG/1
TABLET, FILM COATED ORAL
Qty: 6 TABLET | Refills: 0 | Status: SHIPPED | OUTPATIENT
Start: 2021-10-18 | End: 2021-10-23

## 2021-10-18 RX ORDER — ALBUTEROL SULFATE 1.25 MG/3ML
1.25 SOLUTION RESPIRATORY (INHALATION) EVERY 6 HOURS PRN
Qty: 1 BOX | Refills: 0 | Status: SHIPPED | OUTPATIENT
Start: 2021-10-18 | End: 2022-04-26

## 2021-10-18 RX ORDER — PREDNISONE 20 MG/1
20 TABLET ORAL DAILY
Qty: 5 TABLET | Refills: 0 | Status: SHIPPED | OUTPATIENT
Start: 2021-10-18 | End: 2021-10-23

## 2021-10-18 RX ADMIN — LEVALBUTEROL INHALATION SOLUTION 1.25 MG: 1.25 SOLUTION RESPIRATORY (INHALATION) at 11:10

## 2021-10-19 ENCOUNTER — PATIENT MESSAGE (OUTPATIENT)
Dept: FAMILY MEDICINE | Facility: CLINIC | Age: 81
End: 2021-10-19
Payer: MEDICARE

## 2021-10-20 ENCOUNTER — PATIENT MESSAGE (OUTPATIENT)
Dept: CARDIOLOGY | Facility: CLINIC | Age: 81
End: 2021-10-20
Payer: MEDICARE

## 2021-10-20 ENCOUNTER — CLINICAL SUPPORT (OUTPATIENT)
Dept: URGENT CARE | Facility: CLINIC | Age: 81
End: 2021-10-20
Payer: MEDICARE

## 2021-10-20 NOTE — PROGRESS NOTES
Pt here states , He feels better today but the cough is still there. Wants someone to give him some stronger cough syrup to knock the cough out, and to listen to his chest. Pt was advised to make an appt with his pcp for follow up. Pt agreed to do that once he learned that we do not give rx for cough syrup with codeine.

## 2021-10-21 ENCOUNTER — OFFICE VISIT (OUTPATIENT)
Dept: FAMILY MEDICINE | Facility: CLINIC | Age: 81
End: 2021-10-21
Payer: MEDICARE

## 2021-10-21 VITALS
DIASTOLIC BLOOD PRESSURE: 60 MMHG | SYSTOLIC BLOOD PRESSURE: 136 MMHG | HEART RATE: 88 BPM | RESPIRATION RATE: 16 BRPM | TEMPERATURE: 98 F | BODY MASS INDEX: 31.79 KG/M2 | OXYGEN SATURATION: 95 % | HEIGHT: 69 IN | WEIGHT: 214.63 LBS

## 2021-10-21 DIAGNOSIS — N18.4 STAGE 4 CHRONIC KIDNEY DISEASE: ICD-10-CM

## 2021-10-21 DIAGNOSIS — D64.9 SEVERE ANEMIA: ICD-10-CM

## 2021-10-21 DIAGNOSIS — M54.16 LUMBAR RADICULOPATHY: ICD-10-CM

## 2021-10-21 DIAGNOSIS — I25.10 CORONARY ARTERY DISEASE INVOLVING NATIVE CORONARY ARTERY OF NATIVE HEART WITHOUT ANGINA PECTORIS: ICD-10-CM

## 2021-10-21 DIAGNOSIS — I50.42 CHRONIC COMBINED SYSTOLIC (CONGESTIVE) AND DIASTOLIC (CONGESTIVE) HEART FAILURE: ICD-10-CM

## 2021-10-21 DIAGNOSIS — J20.9 ACUTE BRONCHITIS, UNSPECIFIED ORGANISM: Primary | ICD-10-CM

## 2021-10-21 DIAGNOSIS — I48.21 PERMANENT ATRIAL FIBRILLATION: ICD-10-CM

## 2021-10-21 PROCEDURE — 99214 PR OFFICE/OUTPT VISIT, EST, LEVL IV, 30-39 MIN: ICD-10-PCS | Mod: HCNC,S$GLB,, | Performed by: FAMILY MEDICINE

## 2021-10-21 PROCEDURE — 99999 PR PBB SHADOW E&M-EST. PATIENT-LVL III: ICD-10-PCS | Mod: PBBFAC,HCNC,, | Performed by: FAMILY MEDICINE

## 2021-10-21 PROCEDURE — 99214 OFFICE O/P EST MOD 30 MIN: CPT | Mod: HCNC,S$GLB,, | Performed by: FAMILY MEDICINE

## 2021-10-21 PROCEDURE — 1126F PR PAIN SEVERITY QUANTIFIED, NO PAIN PRESENT: ICD-10-PCS | Mod: HCNC,CPTII,S$GLB, | Performed by: FAMILY MEDICINE

## 2021-10-21 PROCEDURE — 3078F PR MOST RECENT DIASTOLIC BLOOD PRESSURE < 80 MM HG: ICD-10-PCS | Mod: HCNC,CPTII,S$GLB, | Performed by: FAMILY MEDICINE

## 2021-10-21 PROCEDURE — 1126F AMNT PAIN NOTED NONE PRSNT: CPT | Mod: HCNC,CPTII,S$GLB, | Performed by: FAMILY MEDICINE

## 2021-10-21 PROCEDURE — 3075F SYST BP GE 130 - 139MM HG: CPT | Mod: HCNC,CPTII,S$GLB, | Performed by: FAMILY MEDICINE

## 2021-10-21 PROCEDURE — 3078F DIAST BP <80 MM HG: CPT | Mod: HCNC,CPTII,S$GLB, | Performed by: FAMILY MEDICINE

## 2021-10-21 PROCEDURE — 99999 PR PBB SHADOW E&M-EST. PATIENT-LVL III: CPT | Mod: PBBFAC,HCNC,, | Performed by: FAMILY MEDICINE

## 2021-10-21 PROCEDURE — 3075F PR MOST RECENT SYSTOLIC BLOOD PRESS GE 130-139MM HG: ICD-10-PCS | Mod: HCNC,CPTII,S$GLB, | Performed by: FAMILY MEDICINE

## 2021-10-21 RX ORDER — CODEINE PHOSPHATE AND GUAIFENESIN 10; 100 MG/5ML; MG/5ML
5 SOLUTION ORAL 3 TIMES DAILY PRN
Qty: 118 ML | Refills: 0 | Status: SHIPPED | OUTPATIENT
Start: 2021-10-21 | End: 2021-10-31

## 2021-10-22 ENCOUNTER — PATIENT MESSAGE (OUTPATIENT)
Dept: FAMILY MEDICINE | Facility: CLINIC | Age: 81
End: 2021-10-22
Payer: MEDICARE

## 2021-10-22 ENCOUNTER — TELEPHONE (OUTPATIENT)
Dept: CARDIOLOGY | Facility: CLINIC | Age: 81
End: 2021-10-22
Payer: MEDICARE

## 2021-10-26 ENCOUNTER — PATIENT MESSAGE (OUTPATIENT)
Dept: CARDIOLOGY | Facility: CLINIC | Age: 81
End: 2021-10-26
Payer: MEDICARE

## 2021-10-28 ENCOUNTER — OFFICE VISIT (OUTPATIENT)
Dept: CARDIOLOGY | Facility: CLINIC | Age: 81
End: 2021-10-28
Payer: MEDICARE

## 2021-10-28 ENCOUNTER — LAB VISIT (OUTPATIENT)
Dept: LAB | Facility: HOSPITAL | Age: 81
End: 2021-10-28
Attending: INTERNAL MEDICINE
Payer: MEDICARE

## 2021-10-28 VITALS
BODY MASS INDEX: 30.83 KG/M2 | DIASTOLIC BLOOD PRESSURE: 64 MMHG | HEART RATE: 86 BPM | RESPIRATION RATE: 16 BRPM | SYSTOLIC BLOOD PRESSURE: 126 MMHG | HEIGHT: 69 IN | OXYGEN SATURATION: 98 % | WEIGHT: 208.13 LBS

## 2021-10-28 DIAGNOSIS — I10 ESSENTIAL HYPERTENSION: ICD-10-CM

## 2021-10-28 DIAGNOSIS — I50.42 CHRONIC COMBINED SYSTOLIC AND DIASTOLIC CONGESTIVE HEART FAILURE: ICD-10-CM

## 2021-10-28 DIAGNOSIS — J98.4 CHRONIC RESTRICTIVE LUNG DISEASE: ICD-10-CM

## 2021-10-28 DIAGNOSIS — G47.33 OBSTRUCTIVE SLEEP APNEA: ICD-10-CM

## 2021-10-28 DIAGNOSIS — I48.21 PERMANENT ATRIAL FIBRILLATION: ICD-10-CM

## 2021-10-28 DIAGNOSIS — I25.2 MI, OLD: ICD-10-CM

## 2021-10-28 DIAGNOSIS — I25.118 CORONARY ARTERY DISEASE OF NATIVE ARTERY OF NATIVE HEART WITH STABLE ANGINA PECTORIS: Primary | ICD-10-CM

## 2021-10-28 DIAGNOSIS — Z95.1 S/P CABG X 3: ICD-10-CM

## 2021-10-28 DIAGNOSIS — I48.0 PAROXYSMAL ATRIAL FIBRILLATION: ICD-10-CM

## 2021-10-28 DIAGNOSIS — E78.2 MIXED HYPERLIPIDEMIA: ICD-10-CM

## 2021-10-28 DIAGNOSIS — D50.0 IRON DEFICIENCY ANEMIA DUE TO CHRONIC BLOOD LOSS: ICD-10-CM

## 2021-10-28 LAB
ANION GAP SERPL CALC-SCNC: 11 MMOL/L (ref 8–16)
BNP SERPL-MCNC: 282 PG/ML (ref 0–99)
BUN SERPL-MCNC: 17 MG/DL (ref 8–23)
CALCIUM SERPL-MCNC: 9.4 MG/DL (ref 8.7–10.5)
CHLORIDE SERPL-SCNC: 108 MMOL/L (ref 95–110)
CO2 SERPL-SCNC: 23 MMOL/L (ref 23–29)
CREAT SERPL-MCNC: 1.8 MG/DL (ref 0.5–1.4)
EST. GFR  (AFRICAN AMERICAN): 40 ML/MIN/1.73 M^2
EST. GFR  (NON AFRICAN AMERICAN): 35 ML/MIN/1.73 M^2
GLUCOSE SERPL-MCNC: 127 MG/DL (ref 70–110)
MAGNESIUM SERPL-MCNC: 1.9 MG/DL (ref 1.6–2.6)
POTASSIUM SERPL-SCNC: 4.7 MMOL/L (ref 3.5–5.1)
SODIUM SERPL-SCNC: 142 MMOL/L (ref 136–145)

## 2021-10-28 PROCEDURE — 1160F PR REVIEW ALL MEDS BY PRESCRIBER/CLIN PHARMACIST DOCUMENTED: ICD-10-PCS | Mod: HCNC,CPTII,S$GLB, | Performed by: INTERNAL MEDICINE

## 2021-10-28 PROCEDURE — 3074F SYST BP LT 130 MM HG: CPT | Mod: HCNC,CPTII,S$GLB, | Performed by: INTERNAL MEDICINE

## 2021-10-28 PROCEDURE — 99214 OFFICE O/P EST MOD 30 MIN: CPT | Mod: HCNC,S$GLB,, | Performed by: INTERNAL MEDICINE

## 2021-10-28 PROCEDURE — 83880 ASSAY OF NATRIURETIC PEPTIDE: CPT | Mod: HCNC | Performed by: INTERNAL MEDICINE

## 2021-10-28 PROCEDURE — 1159F PR MEDICATION LIST DOCUMENTED IN MEDICAL RECORD: ICD-10-PCS | Mod: HCNC,CPTII,S$GLB, | Performed by: INTERNAL MEDICINE

## 2021-10-28 PROCEDURE — 1126F AMNT PAIN NOTED NONE PRSNT: CPT | Mod: HCNC,CPTII,S$GLB, | Performed by: INTERNAL MEDICINE

## 2021-10-28 PROCEDURE — 80048 BASIC METABOLIC PNL TOTAL CA: CPT | Mod: HCNC | Performed by: INTERNAL MEDICINE

## 2021-10-28 PROCEDURE — 83735 ASSAY OF MAGNESIUM: CPT | Mod: HCNC | Performed by: INTERNAL MEDICINE

## 2021-10-28 PROCEDURE — 3074F PR MOST RECENT SYSTOLIC BLOOD PRESSURE < 130 MM HG: ICD-10-PCS | Mod: HCNC,CPTII,S$GLB, | Performed by: INTERNAL MEDICINE

## 2021-10-28 PROCEDURE — 99999 PR PBB SHADOW E&M-EST. PATIENT-LVL V: CPT | Mod: PBBFAC,HCNC,, | Performed by: INTERNAL MEDICINE

## 2021-10-28 PROCEDURE — 99214 PR OFFICE/OUTPT VISIT, EST, LEVL IV, 30-39 MIN: ICD-10-PCS | Mod: HCNC,S$GLB,, | Performed by: INTERNAL MEDICINE

## 2021-10-28 PROCEDURE — 36415 COLL VENOUS BLD VENIPUNCTURE: CPT | Mod: HCNC | Performed by: INTERNAL MEDICINE

## 2021-10-28 PROCEDURE — 1159F MED LIST DOCD IN RCRD: CPT | Mod: HCNC,CPTII,S$GLB, | Performed by: INTERNAL MEDICINE

## 2021-10-28 PROCEDURE — 3078F DIAST BP <80 MM HG: CPT | Mod: HCNC,CPTII,S$GLB, | Performed by: INTERNAL MEDICINE

## 2021-10-28 PROCEDURE — 1160F RVW MEDS BY RX/DR IN RCRD: CPT | Mod: HCNC,CPTII,S$GLB, | Performed by: INTERNAL MEDICINE

## 2021-10-28 PROCEDURE — 99999 PR PBB SHADOW E&M-EST. PATIENT-LVL V: ICD-10-PCS | Mod: PBBFAC,HCNC,, | Performed by: INTERNAL MEDICINE

## 2021-10-28 PROCEDURE — 1126F PR PAIN SEVERITY QUANTIFIED, NO PAIN PRESENT: ICD-10-PCS | Mod: HCNC,CPTII,S$GLB, | Performed by: INTERNAL MEDICINE

## 2021-10-28 PROCEDURE — 3078F PR MOST RECENT DIASTOLIC BLOOD PRESSURE < 80 MM HG: ICD-10-PCS | Mod: HCNC,CPTII,S$GLB, | Performed by: INTERNAL MEDICINE

## 2021-10-28 RX ORDER — SPIRONOLACTONE 25 MG/1
25 TABLET ORAL DAILY
Qty: 90 TABLET | Refills: 1 | Status: SHIPPED | OUTPATIENT
Start: 2021-10-28 | End: 2022-02-08 | Stop reason: SDUPTHER

## 2021-10-28 RX ORDER — FUROSEMIDE 20 MG/1
20 TABLET ORAL 2 TIMES DAILY
Qty: 60 TABLET | Refills: 11 | Status: SHIPPED | OUTPATIENT
Start: 2021-10-28 | End: 2022-04-28 | Stop reason: SDUPTHER

## 2021-10-29 ENCOUNTER — INFUSION (OUTPATIENT)
Dept: INFUSION THERAPY | Facility: HOSPITAL | Age: 81
End: 2021-10-29
Attending: NURSE PRACTITIONER
Payer: MEDICARE

## 2021-10-29 VITALS
RESPIRATION RATE: 16 BRPM | DIASTOLIC BLOOD PRESSURE: 68 MMHG | HEART RATE: 81 BPM | SYSTOLIC BLOOD PRESSURE: 118 MMHG | TEMPERATURE: 97 F | OXYGEN SATURATION: 99 %

## 2021-10-29 DIAGNOSIS — D63.1 ANEMIA ASSOCIATED WITH STAGE 4 CHRONIC RENAL FAILURE: Primary | ICD-10-CM

## 2021-10-29 DIAGNOSIS — N18.4 ANEMIA ASSOCIATED WITH STAGE 4 CHRONIC RENAL FAILURE: Primary | ICD-10-CM

## 2021-10-29 PROCEDURE — 96372 THER/PROPH/DIAG INJ SC/IM: CPT | Mod: HCNC

## 2021-10-29 PROCEDURE — 63600175 PHARM REV CODE 636 W HCPCS: Mod: JG,HCNC,EC | Performed by: NURSE PRACTITIONER

## 2021-10-29 RX ADMIN — EPOETIN ALFA-EPBX 10000 UNITS: 10000 INJECTION, SOLUTION INTRAVENOUS; SUBCUTANEOUS at 01:10

## 2021-11-10 DIAGNOSIS — I51.7 CARDIOMEGALY: ICD-10-CM

## 2021-11-10 DIAGNOSIS — I50.30 DIASTOLIC CONGESTIVE HEART FAILURE, UNSPECIFIED HF CHRONICITY: ICD-10-CM

## 2021-11-10 DIAGNOSIS — N18.4 STAGE 4 CHRONIC KIDNEY DISEASE: ICD-10-CM

## 2021-11-10 DIAGNOSIS — N25.81 SECONDARY HYPERPARATHYROIDISM OF RENAL ORIGIN: ICD-10-CM

## 2021-11-10 DIAGNOSIS — I25.118 CORONARY ARTERY DISEASE OF NATIVE ARTERY OF NATIVE HEART WITH STABLE ANGINA PECTORIS: ICD-10-CM

## 2021-11-10 DIAGNOSIS — J98.4 MIXED RESTRICTIVE AND OBSTRUCTIVE LUNG DISEASE: Primary | ICD-10-CM

## 2021-11-10 DIAGNOSIS — I25.2 MI, OLD: ICD-10-CM

## 2021-11-10 DIAGNOSIS — Z95.1 S/P CABG X 3: ICD-10-CM

## 2021-11-10 DIAGNOSIS — I10 PRIMARY HYPERTENSION: ICD-10-CM

## 2021-11-10 DIAGNOSIS — J98.4 CHRONIC RESTRICTIVE LUNG DISEASE: ICD-10-CM

## 2021-11-10 DIAGNOSIS — E78.2 MIXED HYPERLIPIDEMIA: ICD-10-CM

## 2021-11-10 DIAGNOSIS — G47.33 OBSTRUCTIVE SLEEP APNEA: ICD-10-CM

## 2021-11-10 DIAGNOSIS — G47.34 NOCTURNAL HYPOXEMIA: ICD-10-CM

## 2021-11-10 DIAGNOSIS — I70.0 ARTERIOSCLEROSIS OF AORTA: ICD-10-CM

## 2021-11-10 DIAGNOSIS — I48.91 ATRIAL FIBRILLATION, UNSPECIFIED TYPE: ICD-10-CM

## 2021-11-10 DIAGNOSIS — Z45.018 BIVENTRICULAR PACEMAKER CHECK: ICD-10-CM

## 2021-11-10 DIAGNOSIS — J43.9 MIXED RESTRICTIVE AND OBSTRUCTIVE LUNG DISEASE: Primary | ICD-10-CM

## 2021-11-11 ENCOUNTER — HOSPITAL ENCOUNTER (OUTPATIENT)
Dept: CARDIOLOGY | Facility: HOSPITAL | Age: 81
Discharge: HOME | End: 2021-11-11
Attending: INTERNAL MEDICINE
Payer: MEDICARE

## 2021-11-11 ENCOUNTER — OFFICE VISIT (OUTPATIENT)
Dept: CARDIOLOGY | Facility: CLINIC | Age: 81
End: 2021-11-11
Payer: MEDICARE

## 2021-11-11 VITALS
BODY MASS INDEX: 30.7 KG/M2 | HEIGHT: 69 IN | RESPIRATION RATE: 16 BRPM | DIASTOLIC BLOOD PRESSURE: 62 MMHG | SYSTOLIC BLOOD PRESSURE: 124 MMHG | WEIGHT: 207.25 LBS | HEART RATE: 84 BPM | OXYGEN SATURATION: 99 %

## 2021-11-11 DIAGNOSIS — I48.0 PAROXYSMAL ATRIAL FIBRILLATION: ICD-10-CM

## 2021-11-11 DIAGNOSIS — I48.91 ATRIAL FIBRILLATION, UNSPECIFIED TYPE: ICD-10-CM

## 2021-11-11 DIAGNOSIS — J98.4 CHRONIC RESTRICTIVE LUNG DISEASE: ICD-10-CM

## 2021-11-11 DIAGNOSIS — J98.4 MIXED RESTRICTIVE AND OBSTRUCTIVE LUNG DISEASE: ICD-10-CM

## 2021-11-11 DIAGNOSIS — G47.34 NOCTURNAL HYPOXEMIA: ICD-10-CM

## 2021-11-11 DIAGNOSIS — I50.30 DIASTOLIC CONGESTIVE HEART FAILURE, UNSPECIFIED HF CHRONICITY: ICD-10-CM

## 2021-11-11 DIAGNOSIS — I10 PRIMARY HYPERTENSION: ICD-10-CM

## 2021-11-11 DIAGNOSIS — Z45.018 BIVENTRICULAR PACEMAKER CHECK: ICD-10-CM

## 2021-11-11 DIAGNOSIS — N25.81 SECONDARY HYPERPARATHYROIDISM OF RENAL ORIGIN: ICD-10-CM

## 2021-11-11 DIAGNOSIS — G47.33 OBSTRUCTIVE SLEEP APNEA: ICD-10-CM

## 2021-11-11 DIAGNOSIS — I25.2 MI, OLD: ICD-10-CM

## 2021-11-11 DIAGNOSIS — J43.9 MIXED RESTRICTIVE AND OBSTRUCTIVE LUNG DISEASE: ICD-10-CM

## 2021-11-11 DIAGNOSIS — Z95.1 S/P CABG X 3: ICD-10-CM

## 2021-11-11 DIAGNOSIS — I50.32 CHRONIC DIASTOLIC CONGESTIVE HEART FAILURE: ICD-10-CM

## 2021-11-11 DIAGNOSIS — I70.0 ARTERIOSCLEROSIS OF AORTA: ICD-10-CM

## 2021-11-11 DIAGNOSIS — E78.2 MIXED HYPERLIPIDEMIA: ICD-10-CM

## 2021-11-11 DIAGNOSIS — I25.118 CORONARY ARTERY DISEASE OF NATIVE ARTERY OF NATIVE HEART WITH STABLE ANGINA PECTORIS: Primary | ICD-10-CM

## 2021-11-11 DIAGNOSIS — N18.4 STAGE 4 CHRONIC KIDNEY DISEASE: ICD-10-CM

## 2021-11-11 DIAGNOSIS — I25.118 CORONARY ARTERY DISEASE OF NATIVE ARTERY OF NATIVE HEART WITH STABLE ANGINA PECTORIS: ICD-10-CM

## 2021-11-11 DIAGNOSIS — I51.7 CARDIOMEGALY: ICD-10-CM

## 2021-11-11 PROCEDURE — 3074F SYST BP LT 130 MM HG: CPT | Mod: HCNC,CPTII,S$GLB, | Performed by: INTERNAL MEDICINE

## 2021-11-11 PROCEDURE — 99214 PR OFFICE/OUTPT VISIT, EST, LEVL IV, 30-39 MIN: ICD-10-PCS | Mod: HCNC,S$GLB,, | Performed by: INTERNAL MEDICINE

## 2021-11-11 PROCEDURE — 93010 EKG 12-LEAD: ICD-10-PCS | Mod: HCNC,,, | Performed by: INTERNAL MEDICINE

## 2021-11-11 PROCEDURE — 1126F AMNT PAIN NOTED NONE PRSNT: CPT | Mod: HCNC,CPTII,S$GLB, | Performed by: INTERNAL MEDICINE

## 2021-11-11 PROCEDURE — 99999 PR PBB SHADOW E&M-EST. PATIENT-LVL V: ICD-10-PCS | Mod: PBBFAC,HCNC,, | Performed by: INTERNAL MEDICINE

## 2021-11-11 PROCEDURE — 1159F PR MEDICATION LIST DOCUMENTED IN MEDICAL RECORD: ICD-10-PCS | Mod: HCNC,CPTII,S$GLB, | Performed by: INTERNAL MEDICINE

## 2021-11-11 PROCEDURE — 1160F RVW MEDS BY RX/DR IN RCRD: CPT | Mod: HCNC,CPTII,S$GLB, | Performed by: INTERNAL MEDICINE

## 2021-11-11 PROCEDURE — 1160F PR REVIEW ALL MEDS BY PRESCRIBER/CLIN PHARMACIST DOCUMENTED: ICD-10-PCS | Mod: HCNC,CPTII,S$GLB, | Performed by: INTERNAL MEDICINE

## 2021-11-11 PROCEDURE — 3074F PR MOST RECENT SYSTOLIC BLOOD PRESSURE < 130 MM HG: ICD-10-PCS | Mod: HCNC,CPTII,S$GLB, | Performed by: INTERNAL MEDICINE

## 2021-11-11 PROCEDURE — 93005 ELECTROCARDIOGRAM TRACING: CPT | Mod: HCNC

## 2021-11-11 PROCEDURE — 1126F PR PAIN SEVERITY QUANTIFIED, NO PAIN PRESENT: ICD-10-PCS | Mod: HCNC,CPTII,S$GLB, | Performed by: INTERNAL MEDICINE

## 2021-11-11 PROCEDURE — 3078F PR MOST RECENT DIASTOLIC BLOOD PRESSURE < 80 MM HG: ICD-10-PCS | Mod: HCNC,CPTII,S$GLB, | Performed by: INTERNAL MEDICINE

## 2021-11-11 PROCEDURE — 3078F DIAST BP <80 MM HG: CPT | Mod: HCNC,CPTII,S$GLB, | Performed by: INTERNAL MEDICINE

## 2021-11-11 PROCEDURE — 93010 ELECTROCARDIOGRAM REPORT: CPT | Mod: HCNC,,, | Performed by: INTERNAL MEDICINE

## 2021-11-11 PROCEDURE — 99999 PR PBB SHADOW E&M-EST. PATIENT-LVL V: CPT | Mod: PBBFAC,HCNC,, | Performed by: INTERNAL MEDICINE

## 2021-11-11 PROCEDURE — 1159F MED LIST DOCD IN RCRD: CPT | Mod: HCNC,CPTII,S$GLB, | Performed by: INTERNAL MEDICINE

## 2021-11-11 PROCEDURE — 99214 OFFICE O/P EST MOD 30 MIN: CPT | Mod: HCNC,S$GLB,, | Performed by: INTERNAL MEDICINE

## 2021-11-12 ENCOUNTER — INFUSION (OUTPATIENT)
Dept: INFUSION THERAPY | Facility: HOSPITAL | Age: 81
End: 2021-11-12
Attending: NURSE PRACTITIONER
Payer: MEDICARE

## 2021-11-12 VITALS
RESPIRATION RATE: 16 BRPM | SYSTOLIC BLOOD PRESSURE: 109 MMHG | TEMPERATURE: 98 F | OXYGEN SATURATION: 98 % | DIASTOLIC BLOOD PRESSURE: 60 MMHG | HEART RATE: 60 BPM

## 2021-11-12 DIAGNOSIS — N18.4 ANEMIA ASSOCIATED WITH STAGE 4 CHRONIC RENAL FAILURE: Primary | ICD-10-CM

## 2021-11-12 DIAGNOSIS — D63.1 ANEMIA ASSOCIATED WITH STAGE 4 CHRONIC RENAL FAILURE: Primary | ICD-10-CM

## 2021-11-12 PROCEDURE — 63600175 PHARM REV CODE 636 W HCPCS: Mod: JG,HCNC | Performed by: NURSE PRACTITIONER

## 2021-11-12 PROCEDURE — 96372 THER/PROPH/DIAG INJ SC/IM: CPT | Mod: HCNC

## 2021-11-12 RX ADMIN — EPOETIN ALFA-EPBX 10000 UNITS: 10000 INJECTION, SOLUTION INTRAVENOUS; SUBCUTANEOUS at 01:11

## 2021-11-16 ENCOUNTER — PATIENT OUTREACH (OUTPATIENT)
Dept: ADMINISTRATIVE | Facility: OTHER | Age: 81
End: 2021-11-16
Payer: MEDICARE

## 2021-11-16 ENCOUNTER — TELEPHONE (OUTPATIENT)
Dept: PAIN MEDICINE | Facility: CLINIC | Age: 81
End: 2021-11-16
Payer: MEDICARE

## 2021-11-17 ENCOUNTER — PATIENT MESSAGE (OUTPATIENT)
Dept: PAIN MEDICINE | Facility: CLINIC | Age: 81
End: 2021-11-17

## 2021-11-17 ENCOUNTER — OFFICE VISIT (OUTPATIENT)
Dept: PAIN MEDICINE | Facility: CLINIC | Age: 81
End: 2021-11-17
Payer: MEDICARE

## 2021-11-17 VITALS
DIASTOLIC BLOOD PRESSURE: 68 MMHG | HEART RATE: 87 BPM | SYSTOLIC BLOOD PRESSURE: 130 MMHG | RESPIRATION RATE: 18 BRPM | HEIGHT: 69 IN | WEIGHT: 215.81 LBS | BODY MASS INDEX: 31.96 KG/M2

## 2021-11-17 DIAGNOSIS — M96.1 FAILED BACK SURGICAL SYNDROME: Primary | ICD-10-CM

## 2021-11-17 DIAGNOSIS — M47.816 LUMBAR SPONDYLOSIS: ICD-10-CM

## 2021-11-17 DIAGNOSIS — M54.59 LUMBAR FACET JOINT PAIN: ICD-10-CM

## 2021-11-17 DIAGNOSIS — M54.16 LUMBAR RADICULOPATHY, CHRONIC: ICD-10-CM

## 2021-11-17 PROCEDURE — 3075F SYST BP GE 130 - 139MM HG: CPT | Mod: HCNC,CPTII,S$GLB, | Performed by: ANESTHESIOLOGY

## 2021-11-17 PROCEDURE — 99214 OFFICE O/P EST MOD 30 MIN: CPT | Mod: HCNC,S$GLB,, | Performed by: ANESTHESIOLOGY

## 2021-11-17 PROCEDURE — 1126F PR PAIN SEVERITY QUANTIFIED, NO PAIN PRESENT: ICD-10-PCS | Mod: HCNC,CPTII,S$GLB, | Performed by: ANESTHESIOLOGY

## 2021-11-17 PROCEDURE — 3288F PR FALLS RISK ASSESSMENT DOCUMENTED: ICD-10-PCS | Mod: HCNC,CPTII,S$GLB, | Performed by: ANESTHESIOLOGY

## 2021-11-17 PROCEDURE — 1101F PT FALLS ASSESS-DOCD LE1/YR: CPT | Mod: HCNC,CPTII,S$GLB, | Performed by: ANESTHESIOLOGY

## 2021-11-17 PROCEDURE — 99999 PR PBB SHADOW E&M-EST. PATIENT-LVL IV: ICD-10-PCS | Mod: PBBFAC,HCNC,, | Performed by: ANESTHESIOLOGY

## 2021-11-17 PROCEDURE — 3078F PR MOST RECENT DIASTOLIC BLOOD PRESSURE < 80 MM HG: ICD-10-PCS | Mod: HCNC,CPTII,S$GLB, | Performed by: ANESTHESIOLOGY

## 2021-11-17 PROCEDURE — 3075F PR MOST RECENT SYSTOLIC BLOOD PRESS GE 130-139MM HG: ICD-10-PCS | Mod: HCNC,CPTII,S$GLB, | Performed by: ANESTHESIOLOGY

## 2021-11-17 PROCEDURE — 1126F AMNT PAIN NOTED NONE PRSNT: CPT | Mod: HCNC,CPTII,S$GLB, | Performed by: ANESTHESIOLOGY

## 2021-11-17 PROCEDURE — 1101F PR PT FALLS ASSESS DOC 0-1 FALLS W/OUT INJ PAST YR: ICD-10-PCS | Mod: HCNC,CPTII,S$GLB, | Performed by: ANESTHESIOLOGY

## 2021-11-17 PROCEDURE — 1159F MED LIST DOCD IN RCRD: CPT | Mod: HCNC,CPTII,S$GLB, | Performed by: ANESTHESIOLOGY

## 2021-11-17 PROCEDURE — 99999 PR PBB SHADOW E&M-EST. PATIENT-LVL IV: CPT | Mod: PBBFAC,HCNC,, | Performed by: ANESTHESIOLOGY

## 2021-11-17 PROCEDURE — 3288F FALL RISK ASSESSMENT DOCD: CPT | Mod: HCNC,CPTII,S$GLB, | Performed by: ANESTHESIOLOGY

## 2021-11-17 PROCEDURE — 3078F DIAST BP <80 MM HG: CPT | Mod: HCNC,CPTII,S$GLB, | Performed by: ANESTHESIOLOGY

## 2021-11-17 PROCEDURE — 1159F PR MEDICATION LIST DOCUMENTED IN MEDICAL RECORD: ICD-10-PCS | Mod: HCNC,CPTII,S$GLB, | Performed by: ANESTHESIOLOGY

## 2021-11-17 PROCEDURE — 99214 PR OFFICE/OUTPT VISIT, EST, LEVL IV, 30-39 MIN: ICD-10-PCS | Mod: HCNC,S$GLB,, | Performed by: ANESTHESIOLOGY

## 2021-11-17 RX ORDER — PREGABALIN 75 MG/1
CAPSULE ORAL
Qty: 147 CAPSULE | Refills: 0 | Status: SHIPPED | OUTPATIENT
Start: 2021-11-17 | End: 2022-03-17

## 2021-11-18 ENCOUNTER — PATIENT MESSAGE (OUTPATIENT)
Dept: PAIN MEDICINE | Facility: CLINIC | Age: 81
End: 2021-11-18
Payer: MEDICARE

## 2021-11-18 ENCOUNTER — TELEPHONE (OUTPATIENT)
Dept: PAIN MEDICINE | Facility: CLINIC | Age: 81
End: 2021-11-18
Payer: MEDICARE

## 2021-11-26 ENCOUNTER — OFFICE VISIT (OUTPATIENT)
Dept: HEMATOLOGY/ONCOLOGY | Facility: CLINIC | Age: 81
End: 2021-11-26
Payer: MEDICARE

## 2021-11-26 ENCOUNTER — INFUSION (OUTPATIENT)
Dept: INFUSION THERAPY | Facility: HOSPITAL | Age: 81
End: 2021-11-26
Attending: INTERNAL MEDICINE
Payer: MEDICARE

## 2021-11-26 VITALS
DIASTOLIC BLOOD PRESSURE: 74 MMHG | HEIGHT: 69 IN | WEIGHT: 212.63 LBS | DIASTOLIC BLOOD PRESSURE: 82 MMHG | RESPIRATION RATE: 18 BRPM | OXYGEN SATURATION: 98 % | TEMPERATURE: 97 F | SYSTOLIC BLOOD PRESSURE: 140 MMHG | OXYGEN SATURATION: 99 % | BODY MASS INDEX: 31.49 KG/M2 | HEART RATE: 80 BPM | TEMPERATURE: 97 F | SYSTOLIC BLOOD PRESSURE: 158 MMHG | HEART RATE: 78 BPM

## 2021-11-26 DIAGNOSIS — N18.4 ANEMIA ASSOCIATED WITH STAGE 4 CHRONIC RENAL FAILURE: Primary | ICD-10-CM

## 2021-11-26 DIAGNOSIS — N18.4 STAGE 4 CHRONIC KIDNEY DISEASE: ICD-10-CM

## 2021-11-26 DIAGNOSIS — D63.1 ANEMIA ASSOCIATED WITH STAGE 4 CHRONIC RENAL FAILURE: Primary | ICD-10-CM

## 2021-11-26 DIAGNOSIS — D50.0 IRON DEFICIENCY ANEMIA DUE TO CHRONIC BLOOD LOSS: ICD-10-CM

## 2021-11-26 PROCEDURE — 99214 PR OFFICE/OUTPT VISIT, EST, LEVL IV, 30-39 MIN: ICD-10-PCS | Mod: HCNC,S$GLB,, | Performed by: INTERNAL MEDICINE

## 2021-11-26 PROCEDURE — 96372 THER/PROPH/DIAG INJ SC/IM: CPT | Mod: HCNC

## 2021-11-26 PROCEDURE — 99999 PR PBB SHADOW E&M-EST. PATIENT-LVL IV: ICD-10-PCS | Mod: PBBFAC,HCNC,, | Performed by: INTERNAL MEDICINE

## 2021-11-26 PROCEDURE — 99214 OFFICE O/P EST MOD 30 MIN: CPT | Mod: HCNC,S$GLB,, | Performed by: INTERNAL MEDICINE

## 2021-11-26 PROCEDURE — 99999 PR PBB SHADOW E&M-EST. PATIENT-LVL IV: CPT | Mod: PBBFAC,HCNC,, | Performed by: INTERNAL MEDICINE

## 2021-11-26 PROCEDURE — 63600175 PHARM REV CODE 636 W HCPCS: Mod: JG,HCNC,EC | Performed by: INTERNAL MEDICINE

## 2021-11-26 RX ADMIN — EPOETIN ALFA-EPBX 20000 UNITS: 20000 INJECTION, SOLUTION INTRAVENOUS; SUBCUTANEOUS at 02:11

## 2021-12-03 ENCOUNTER — INFUSION (OUTPATIENT)
Dept: INFUSION THERAPY | Facility: HOSPITAL | Age: 81
End: 2021-12-03
Attending: NURSE PRACTITIONER
Payer: MEDICARE

## 2021-12-03 ENCOUNTER — PATIENT MESSAGE (OUTPATIENT)
Dept: HEMATOLOGY/ONCOLOGY | Facility: CLINIC | Age: 81
End: 2021-12-03
Payer: MEDICARE

## 2021-12-03 VITALS
OXYGEN SATURATION: 99 % | RESPIRATION RATE: 20 BRPM | HEART RATE: 96 BPM | DIASTOLIC BLOOD PRESSURE: 65 MMHG | SYSTOLIC BLOOD PRESSURE: 139 MMHG | TEMPERATURE: 99 F

## 2021-12-03 DIAGNOSIS — D63.1 ANEMIA ASSOCIATED WITH STAGE 4 CHRONIC RENAL FAILURE: Primary | ICD-10-CM

## 2021-12-03 DIAGNOSIS — N18.4 ANEMIA ASSOCIATED WITH STAGE 4 CHRONIC RENAL FAILURE: Primary | ICD-10-CM

## 2021-12-03 PROCEDURE — 96372 THER/PROPH/DIAG INJ SC/IM: CPT | Mod: HCNC

## 2021-12-03 PROCEDURE — 63600175 PHARM REV CODE 636 W HCPCS: Mod: JG,HCNC,EC | Performed by: INTERNAL MEDICINE

## 2021-12-03 RX ADMIN — EPOETIN ALFA-EPBX 20000 UNITS: 20000 INJECTION, SOLUTION INTRAVENOUS; SUBCUTANEOUS at 02:12

## 2021-12-09 ENCOUNTER — INFUSION (OUTPATIENT)
Dept: INFUSION THERAPY | Facility: HOSPITAL | Age: 81
End: 2021-12-09
Attending: NURSE PRACTITIONER
Payer: MEDICARE

## 2021-12-09 VITALS
RESPIRATION RATE: 18 BRPM | SYSTOLIC BLOOD PRESSURE: 126 MMHG | TEMPERATURE: 98 F | HEART RATE: 87 BPM | OXYGEN SATURATION: 100 % | DIASTOLIC BLOOD PRESSURE: 70 MMHG

## 2021-12-09 DIAGNOSIS — N18.4 ANEMIA ASSOCIATED WITH STAGE 4 CHRONIC RENAL FAILURE: Primary | ICD-10-CM

## 2021-12-09 DIAGNOSIS — D63.1 ANEMIA ASSOCIATED WITH STAGE 4 CHRONIC RENAL FAILURE: Primary | ICD-10-CM

## 2021-12-09 PROCEDURE — 96372 THER/PROPH/DIAG INJ SC/IM: CPT | Mod: HCNC

## 2021-12-09 PROCEDURE — 63600175 PHARM REV CODE 636 W HCPCS: Mod: JG,HCNC,EC | Performed by: INTERNAL MEDICINE

## 2021-12-09 RX ADMIN — EPOETIN ALFA-EPBX 20000 UNITS: 20000 INJECTION, SOLUTION INTRAVENOUS; SUBCUTANEOUS at 09:12

## 2021-12-13 DIAGNOSIS — G47.61 PERIODIC LIMB MOVEMENT DISORDER (PLMD): ICD-10-CM

## 2021-12-16 RX ORDER — CLONAZEPAM 1 MG/1
1 TABLET ORAL NIGHTLY PRN
Qty: 90 TABLET | Refills: 0 | Status: SHIPPED | OUTPATIENT
Start: 2021-12-16 | End: 2022-04-11 | Stop reason: SDUPTHER

## 2021-12-17 ENCOUNTER — HOSPITAL ENCOUNTER (OUTPATIENT)
Dept: CARDIOLOGY | Facility: HOSPITAL | Age: 81
Discharge: HOME | End: 2021-12-17
Attending: INTERNAL MEDICINE
Payer: MEDICARE

## 2021-12-17 ENCOUNTER — OFFICE VISIT (OUTPATIENT)
Dept: CARDIOLOGY | Facility: CLINIC | Age: 81
End: 2021-12-17
Payer: MEDICARE

## 2021-12-17 ENCOUNTER — INFUSION (OUTPATIENT)
Dept: INFUSION THERAPY | Facility: HOSPITAL | Age: 81
End: 2021-12-17
Attending: NURSE PRACTITIONER
Payer: MEDICARE

## 2021-12-17 VITALS
RESPIRATION RATE: 19 BRPM | TEMPERATURE: 97 F | OXYGEN SATURATION: 99 % | HEART RATE: 68 BPM | DIASTOLIC BLOOD PRESSURE: 60 MMHG | SYSTOLIC BLOOD PRESSURE: 138 MMHG

## 2021-12-17 VITALS
RESPIRATION RATE: 16 BRPM | BODY MASS INDEX: 33.31 KG/M2 | HEIGHT: 69 IN | HEART RATE: 68 BPM | DIASTOLIC BLOOD PRESSURE: 60 MMHG | SYSTOLIC BLOOD PRESSURE: 138 MMHG | OXYGEN SATURATION: 99 % | WEIGHT: 224.88 LBS

## 2021-12-17 DIAGNOSIS — I10 PRIMARY HYPERTENSION: ICD-10-CM

## 2021-12-17 DIAGNOSIS — Z95.1 S/P CABG X 3: ICD-10-CM

## 2021-12-17 DIAGNOSIS — I48.0 PAROXYSMAL ATRIAL FIBRILLATION: ICD-10-CM

## 2021-12-17 DIAGNOSIS — G47.33 OBSTRUCTIVE SLEEP APNEA: ICD-10-CM

## 2021-12-17 DIAGNOSIS — I50.33 ACUTE ON CHRONIC DIASTOLIC CONGESTIVE HEART FAILURE: Primary | ICD-10-CM

## 2021-12-17 DIAGNOSIS — N18.4 ANEMIA ASSOCIATED WITH STAGE 4 CHRONIC RENAL FAILURE: ICD-10-CM

## 2021-12-17 DIAGNOSIS — I50.32 CHRONIC DIASTOLIC CONGESTIVE HEART FAILURE: Primary | ICD-10-CM

## 2021-12-17 DIAGNOSIS — D63.1 ANEMIA ASSOCIATED WITH STAGE 4 CHRONIC RENAL FAILURE: ICD-10-CM

## 2021-12-17 DIAGNOSIS — I48.21 PERMANENT ATRIAL FIBRILLATION: ICD-10-CM

## 2021-12-17 DIAGNOSIS — G47.34 NOCTURNAL HYPOXEMIA: ICD-10-CM

## 2021-12-17 DIAGNOSIS — C61 ADENOCARCINOMA OF PROSTATE: ICD-10-CM

## 2021-12-17 DIAGNOSIS — Z95.0 CARDIAC PACEMAKER IN SITU: ICD-10-CM

## 2021-12-17 DIAGNOSIS — N18.4 ANEMIA ASSOCIATED WITH STAGE 4 CHRONIC RENAL FAILURE: Primary | ICD-10-CM

## 2021-12-17 DIAGNOSIS — K21.9 GASTROESOPHAGEAL REFLUX DISEASE WITHOUT ESOPHAGITIS: ICD-10-CM

## 2021-12-17 DIAGNOSIS — I50.42 CHRONIC COMBINED SYSTOLIC AND DIASTOLIC CONGESTIVE HEART FAILURE: ICD-10-CM

## 2021-12-17 DIAGNOSIS — D63.1 ANEMIA ASSOCIATED WITH STAGE 4 CHRONIC RENAL FAILURE: Primary | ICD-10-CM

## 2021-12-17 DIAGNOSIS — D50.0 IRON DEFICIENCY ANEMIA DUE TO CHRONIC BLOOD LOSS: ICD-10-CM

## 2021-12-17 DIAGNOSIS — N25.81 SECONDARY HYPERPARATHYROIDISM OF RENAL ORIGIN: ICD-10-CM

## 2021-12-17 DIAGNOSIS — Z45.018 BIVENTRICULAR PACEMAKER CHECK: ICD-10-CM

## 2021-12-17 PROCEDURE — 99499 UNLISTED E&M SERVICE: CPT | Mod: S$GLB,,, | Performed by: INTERNAL MEDICINE

## 2021-12-17 PROCEDURE — 93281 PM DEVICE PROGR EVAL MULTI: CPT | Mod: 26,HCNC,, | Performed by: INTERNAL MEDICINE

## 2021-12-17 PROCEDURE — 99499 RISK ADDL DX/OHS AUDIT: ICD-10-PCS | Mod: S$GLB,,, | Performed by: INTERNAL MEDICINE

## 2021-12-17 PROCEDURE — 99999 PR PBB SHADOW E&M-EST. PATIENT-LVL IV: CPT | Mod: PBBFAC,HCNC,, | Performed by: INTERNAL MEDICINE

## 2021-12-17 PROCEDURE — 99215 PR OFFICE/OUTPT VISIT, EST, LEVL V, 40-54 MIN: ICD-10-PCS | Mod: HCNC,S$GLB,, | Performed by: INTERNAL MEDICINE

## 2021-12-17 PROCEDURE — 63600175 PHARM REV CODE 636 W HCPCS: Mod: JG,HCNC,EC | Performed by: INTERNAL MEDICINE

## 2021-12-17 PROCEDURE — 99215 OFFICE O/P EST HI 40 MIN: CPT | Mod: HCNC,S$GLB,, | Performed by: INTERNAL MEDICINE

## 2021-12-17 PROCEDURE — 99999 PR PBB SHADOW E&M-EST. PATIENT-LVL IV: ICD-10-PCS | Mod: PBBFAC,HCNC,, | Performed by: INTERNAL MEDICINE

## 2021-12-17 PROCEDURE — 93281 PM DEVICE PROGR EVAL MULTI: CPT | Mod: HCNC

## 2021-12-17 PROCEDURE — 93281 CARDIAC DEVICE CHECK - IN CLINIC & HOSPITAL: ICD-10-PCS | Mod: 26,HCNC,, | Performed by: INTERNAL MEDICINE

## 2021-12-17 PROCEDURE — 96372 THER/PROPH/DIAG INJ SC/IM: CPT | Mod: HCNC

## 2021-12-17 RX ADMIN — EPOETIN ALFA-EPBX 20000 UNITS: 20000 INJECTION, SOLUTION INTRAVENOUS; SUBCUTANEOUS at 11:12

## 2021-12-20 ENCOUNTER — PATIENT MESSAGE (OUTPATIENT)
Dept: CARDIOLOGY | Facility: CLINIC | Age: 81
End: 2021-12-20
Payer: MEDICARE

## 2021-12-20 DIAGNOSIS — D64.9 SEVERE ANEMIA: Primary | ICD-10-CM

## 2021-12-21 ENCOUNTER — PATIENT MESSAGE (OUTPATIENT)
Dept: CARDIOLOGY | Facility: CLINIC | Age: 81
End: 2021-12-21
Payer: MEDICARE

## 2021-12-21 ENCOUNTER — PATIENT MESSAGE (OUTPATIENT)
Dept: PAIN MEDICINE | Facility: CLINIC | Age: 81
End: 2021-12-21
Payer: MEDICARE

## 2021-12-22 ENCOUNTER — HOSPITAL ENCOUNTER (OUTPATIENT)
Facility: HOSPITAL | Age: 81
Discharge: HOME | End: 2021-12-24
Attending: EMERGENCY MEDICINE | Admitting: FAMILY MEDICINE
Payer: MEDICARE

## 2021-12-22 DIAGNOSIS — E11.69 DIABETES MELLITUS TYPE 2 IN OBESE: ICD-10-CM

## 2021-12-22 DIAGNOSIS — M51.36 DDD (DEGENERATIVE DISC DISEASE), LUMBAR: ICD-10-CM

## 2021-12-22 DIAGNOSIS — D63.1 ANEMIA ASSOCIATED WITH STAGE 4 CHRONIC RENAL FAILURE: ICD-10-CM

## 2021-12-22 DIAGNOSIS — N18.4 ANEMIA ASSOCIATED WITH STAGE 4 CHRONIC RENAL FAILURE: ICD-10-CM

## 2021-12-22 DIAGNOSIS — E66.9 DIABETES MELLITUS TYPE 2 IN OBESE: ICD-10-CM

## 2021-12-22 DIAGNOSIS — R53.1 WEAKNESS: ICD-10-CM

## 2021-12-22 DIAGNOSIS — R07.9 CHEST PAIN: ICD-10-CM

## 2021-12-22 DIAGNOSIS — R55 NEAR SYNCOPE: ICD-10-CM

## 2021-12-22 DIAGNOSIS — N18.4 STAGE 4 CHRONIC KIDNEY DISEASE: Primary | ICD-10-CM

## 2021-12-22 DIAGNOSIS — I48.0 PAF (PAROXYSMAL ATRIAL FIBRILLATION): ICD-10-CM

## 2021-12-22 DIAGNOSIS — D64.9 SYMPTOMATIC ANEMIA: ICD-10-CM

## 2021-12-22 DIAGNOSIS — R79.89 ELEVATED TROPONIN: ICD-10-CM

## 2021-12-22 DIAGNOSIS — I50.33 ACUTE ON CHRONIC DIASTOLIC CONGESTIVE HEART FAILURE: ICD-10-CM

## 2021-12-22 LAB
ABO + RH BLD: NORMAL
ALBUMIN SERPL BCP-MCNC: 3.9 G/DL (ref 3.5–5.2)
ALP SERPL-CCNC: 78 U/L (ref 55–135)
ALT SERPL W/O P-5'-P-CCNC: 18 U/L (ref 10–44)
ANION GAP SERPL CALC-SCNC: 13 MMOL/L (ref 8–16)
ANISOCYTOSIS BLD QL SMEAR: SLIGHT
AST SERPL-CCNC: 26 U/L (ref 10–40)
BASOPHILS # BLD AUTO: 0.04 K/UL (ref 0–0.2)
BASOPHILS NFR BLD: 1.1 % (ref 0–1.9)
BILIRUB SERPL-MCNC: 0.5 MG/DL (ref 0.1–1)
BILIRUB UR QL STRIP: NEGATIVE
BLD GP AB SCN CELLS X3 SERPL QL: NORMAL
BLD PROD TYP BPU: NORMAL
BLD PROD TYP BPU: NORMAL
BLOOD UNIT EXPIRATION DATE: NORMAL
BLOOD UNIT EXPIRATION DATE: NORMAL
BLOOD UNIT TYPE CODE: 5100
BLOOD UNIT TYPE CODE: 5100
BLOOD UNIT TYPE: NORMAL
BLOOD UNIT TYPE: NORMAL
BUN SERPL-MCNC: 28 MG/DL (ref 8–23)
CALCIUM SERPL-MCNC: 8.4 MG/DL (ref 8.7–10.5)
CHLORIDE SERPL-SCNC: 106 MMOL/L (ref 95–110)
CLARITY UR: CLEAR
CO2 SERPL-SCNC: 23 MMOL/L (ref 23–29)
CODING SYSTEM: NORMAL
CODING SYSTEM: NORMAL
COLOR UR: YELLOW
CREAT SERPL-MCNC: 2.1 MG/DL (ref 0.5–1.4)
DACRYOCYTES BLD QL SMEAR: ABNORMAL
DIFFERENTIAL METHOD: ABNORMAL
DISPENSE STATUS: NORMAL
DISPENSE STATUS: NORMAL
EOSINOPHIL # BLD AUTO: 0.1 K/UL (ref 0–0.5)
EOSINOPHIL NFR BLD: 1.3 % (ref 0–8)
ERYTHROCYTE [DISTWIDTH] IN BLOOD BY AUTOMATED COUNT: 18.7 % (ref 11.5–14.5)
EST. GFR  (AFRICAN AMERICAN): 34 ML/MIN/1.73 M^2
EST. GFR  (NON AFRICAN AMERICAN): 29 ML/MIN/1.73 M^2
FERRITIN SERPL-MCNC: 15 NG/ML (ref 20–300)
GLUCOSE SERPL-MCNC: 122 MG/DL (ref 70–110)
GLUCOSE UR QL STRIP: NEGATIVE
HCT VFR BLD AUTO: 20.2 % (ref 40–54)
HGB BLD-MCNC: 5.8 G/DL (ref 14–18)
HGB UR QL STRIP: NEGATIVE
HYPOCHROMIA BLD QL SMEAR: ABNORMAL
IMM GRANULOCYTES # BLD AUTO: 0.01 K/UL (ref 0–0.04)
IMM GRANULOCYTES NFR BLD AUTO: 0.3 % (ref 0–0.5)
IRON SERPL-MCNC: 12 UG/DL (ref 45–160)
KETONES UR QL STRIP: NEGATIVE
LEUKOCYTE ESTERASE UR QL STRIP: NEGATIVE
LYMPHOCYTES # BLD AUTO: 0.5 K/UL (ref 1–4.8)
LYMPHOCYTES NFR BLD: 12.8 % (ref 18–48)
MCH RBC QN AUTO: 24.7 PG (ref 27–31)
MCHC RBC AUTO-ENTMCNC: 28.7 G/DL (ref 32–36)
MCV RBC AUTO: 86 FL (ref 82–98)
MONOCYTES # BLD AUTO: 0.6 K/UL (ref 0.3–1)
MONOCYTES NFR BLD: 14.7 % (ref 4–15)
NEUTROPHILS # BLD AUTO: 2.6 K/UL (ref 1.8–7.7)
NEUTROPHILS NFR BLD: 69.8 % (ref 38–73)
NITRITE UR QL STRIP: NEGATIVE
NRBC BLD-RTO: 1 /100 WBC
NUM UNITS TRANS PACKED RBC: NORMAL
NUM UNITS TRANS PACKED RBC: NORMAL
OB PNL STL: NEGATIVE
OVALOCYTES BLD QL SMEAR: ABNORMAL
PH UR STRIP: 6 [PH] (ref 5–8)
PLATELET # BLD AUTO: 185 K/UL (ref 150–450)
PLATELET BLD QL SMEAR: ABNORMAL
PMV BLD AUTO: 9.3 FL (ref 9.2–12.9)
POIKILOCYTOSIS BLD QL SMEAR: SLIGHT
POTASSIUM SERPL-SCNC: 3.6 MMOL/L (ref 3.5–5.1)
PROT SERPL-MCNC: 6.8 G/DL (ref 6–8.4)
PROT UR QL STRIP: ABNORMAL
RBC # BLD AUTO: 2.35 M/UL (ref 4.6–6.2)
SATURATED IRON: 3 % (ref 20–50)
SODIUM SERPL-SCNC: 142 MMOL/L (ref 136–145)
SP GR UR STRIP: 1.02 (ref 1–1.03)
TARGETS BLD QL SMEAR: ABNORMAL
TOTAL IRON BINDING CAPACITY: 454 UG/DL (ref 250–450)
TRANSFERRIN SERPL-MCNC: 307 MG/DL (ref 200–375)
TROPONIN I SERPL DL<=0.01 NG/ML-MCNC: 0.06 NG/ML (ref 0–0.03)
TROPONIN I SERPL DL<=0.01 NG/ML-MCNC: 0.09 NG/ML (ref 0–0.03)
URN SPEC COLLECT METH UR: ABNORMAL
UROBILINOGEN UR STRIP-ACNC: NEGATIVE EU/DL
WBC # BLD AUTO: 3.75 K/UL (ref 3.9–12.7)

## 2021-12-22 PROCEDURE — 83540 ASSAY OF IRON: CPT | Mod: HCNC | Performed by: EMERGENCY MEDICINE

## 2021-12-22 PROCEDURE — G0378 HOSPITAL OBSERVATION PER HR: HCPCS | Mod: HCNC

## 2021-12-22 PROCEDURE — 81003 URINALYSIS AUTO W/O SCOPE: CPT | Mod: HCNC | Performed by: EMERGENCY MEDICINE

## 2021-12-22 PROCEDURE — 84484 ASSAY OF TROPONIN QUANT: CPT | Mod: HCNC | Performed by: EMERGENCY MEDICINE

## 2021-12-22 PROCEDURE — 36430 TRANSFUSION BLD/BLD COMPNT: CPT | Mod: HCNC

## 2021-12-22 PROCEDURE — 86850 RBC ANTIBODY SCREEN: CPT | Mod: HCNC | Performed by: NURSE PRACTITIONER

## 2021-12-22 PROCEDURE — 93005 ELECTROCARDIOGRAM TRACING: CPT | Mod: HCNC

## 2021-12-22 PROCEDURE — 96361 HYDRATE IV INFUSION ADD-ON: CPT | Mod: HCNC

## 2021-12-22 PROCEDURE — 93010 ELECTROCARDIOGRAM REPORT: CPT | Mod: HCNC,,, | Performed by: INTERNAL MEDICINE

## 2021-12-22 PROCEDURE — 86920 COMPATIBILITY TEST SPIN: CPT | Mod: HCNC,59 | Performed by: NURSE PRACTITIONER

## 2021-12-22 PROCEDURE — 82272 OCCULT BLD FECES 1-3 TESTS: CPT | Mod: HCNC | Performed by: EMERGENCY MEDICINE

## 2021-12-22 PROCEDURE — 25000003 PHARM REV CODE 250: Mod: HCNC | Performed by: NURSE PRACTITIONER

## 2021-12-22 PROCEDURE — A4216 STERILE WATER/SALINE, 10 ML: HCPCS | Mod: HCNC | Performed by: NURSE PRACTITIONER

## 2021-12-22 PROCEDURE — 80053 COMPREHEN METABOLIC PANEL: CPT | Mod: HCNC | Performed by: NURSE PRACTITIONER

## 2021-12-22 PROCEDURE — 96360 HYDRATION IV INFUSION INIT: CPT | Mod: HCNC

## 2021-12-22 PROCEDURE — 85025 COMPLETE CBC W/AUTO DIFF WBC: CPT | Mod: HCNC | Performed by: NURSE PRACTITIONER

## 2021-12-22 PROCEDURE — 99285 EMERGENCY DEPT VISIT HI MDM: CPT | Mod: 25,HCNC

## 2021-12-22 PROCEDURE — 93010 EKG 12-LEAD: ICD-10-PCS | Mod: HCNC,,, | Performed by: INTERNAL MEDICINE

## 2021-12-22 PROCEDURE — 82728 ASSAY OF FERRITIN: CPT | Mod: HCNC | Performed by: EMERGENCY MEDICINE

## 2021-12-22 PROCEDURE — 25000003 PHARM REV CODE 250: Mod: HCNC | Performed by: EMERGENCY MEDICINE

## 2021-12-22 PROCEDURE — P9016 RBC LEUKOCYTES REDUCED: HCPCS | Mod: HCNC | Performed by: EMERGENCY MEDICINE

## 2021-12-22 PROCEDURE — 86920 COMPATIBILITY TEST SPIN: CPT | Mod: HCNC,59 | Performed by: EMERGENCY MEDICINE

## 2021-12-22 RX ORDER — TALC
6 POWDER (GRAM) TOPICAL NIGHTLY PRN
Status: DISCONTINUED | OUTPATIENT
Start: 2021-12-22 | End: 2021-12-24 | Stop reason: HOSPADM

## 2021-12-22 RX ORDER — LANOLIN ALCOHOL/MO/W.PET/CERES
800 CREAM (GRAM) TOPICAL
Status: DISCONTINUED | OUTPATIENT
Start: 2021-12-22 | End: 2021-12-22

## 2021-12-22 RX ORDER — CLOPIDOGREL BISULFATE 75 MG/1
75 TABLET ORAL DAILY
Status: DISCONTINUED | OUTPATIENT
Start: 2021-12-23 | End: 2021-12-22

## 2021-12-22 RX ORDER — HYDRALAZINE HYDROCHLORIDE 20 MG/ML
10 INJECTION INTRAMUSCULAR; INTRAVENOUS EVERY 6 HOURS PRN
Status: DISCONTINUED | OUTPATIENT
Start: 2021-12-22 | End: 2021-12-24 | Stop reason: HOSPADM

## 2021-12-22 RX ORDER — NALOXONE HCL 0.4 MG/ML
0.02 VIAL (ML) INJECTION
Status: DISCONTINUED | OUTPATIENT
Start: 2021-12-22 | End: 2021-12-24 | Stop reason: HOSPADM

## 2021-12-22 RX ORDER — SODIUM,POTASSIUM PHOSPHATES 280-250MG
2 POWDER IN PACKET (EA) ORAL
Status: DISCONTINUED | OUTPATIENT
Start: 2021-12-22 | End: 2021-12-22

## 2021-12-22 RX ORDER — PANTOPRAZOLE SODIUM 40 MG/1
40 TABLET, DELAYED RELEASE ORAL DAILY
Status: DISCONTINUED | OUTPATIENT
Start: 2021-12-23 | End: 2021-12-24 | Stop reason: HOSPADM

## 2021-12-22 RX ORDER — IBUPROFEN 200 MG
16 TABLET ORAL
Status: DISCONTINUED | OUTPATIENT
Start: 2021-12-22 | End: 2021-12-24 | Stop reason: HOSPADM

## 2021-12-22 RX ORDER — LOSARTAN POTASSIUM 50 MG/1
50 TABLET ORAL DAILY
Status: DISCONTINUED | OUTPATIENT
Start: 2021-12-23 | End: 2021-12-22

## 2021-12-22 RX ORDER — ONDANSETRON 8 MG/1
8 TABLET, ORALLY DISINTEGRATING ORAL EVERY 8 HOURS PRN
Status: DISCONTINUED | OUTPATIENT
Start: 2021-12-22 | End: 2021-12-24 | Stop reason: HOSPADM

## 2021-12-22 RX ORDER — INSULIN ASPART 100 [IU]/ML
0-5 INJECTION, SOLUTION INTRAVENOUS; SUBCUTANEOUS
Status: DISCONTINUED | OUTPATIENT
Start: 2021-12-22 | End: 2021-12-24 | Stop reason: HOSPADM

## 2021-12-22 RX ORDER — FUROSEMIDE 20 MG/1
20 TABLET ORAL 2 TIMES DAILY
Status: DISCONTINUED | OUTPATIENT
Start: 2021-12-22 | End: 2021-12-23

## 2021-12-22 RX ORDER — ACETAMINOPHEN 325 MG/1
650 TABLET ORAL EVERY 4 HOURS PRN
Status: DISCONTINUED | OUTPATIENT
Start: 2021-12-22 | End: 2021-12-24 | Stop reason: HOSPADM

## 2021-12-22 RX ORDER — CLONAZEPAM 1 MG/1
1 TABLET ORAL NIGHTLY PRN
Status: DISCONTINUED | OUTPATIENT
Start: 2021-12-22 | End: 2021-12-24 | Stop reason: HOSPADM

## 2021-12-22 RX ORDER — ALBUTEROL SULFATE 0.83 MG/ML
1.25 SOLUTION RESPIRATORY (INHALATION) EVERY 6 HOURS PRN
Refills: 0 | Status: DISCONTINUED | OUTPATIENT
Start: 2021-12-22 | End: 2021-12-24 | Stop reason: HOSPADM

## 2021-12-22 RX ORDER — SODIUM CHLORIDE 0.9 % (FLUSH) 0.9 %
10 SYRINGE (ML) INJECTION EVERY 8 HOURS
Status: DISCONTINUED | OUTPATIENT
Start: 2021-12-22 | End: 2021-12-24 | Stop reason: HOSPADM

## 2021-12-22 RX ORDER — ACETAMINOPHEN 500 MG
5000 TABLET ORAL DAILY
Status: DISCONTINUED | OUTPATIENT
Start: 2021-12-23 | End: 2021-12-24 | Stop reason: HOSPADM

## 2021-12-22 RX ORDER — PROMETHAZINE HYDROCHLORIDE 25 MG/1
25 TABLET ORAL EVERY 6 HOURS PRN
Status: DISCONTINUED | OUTPATIENT
Start: 2021-12-22 | End: 2021-12-24 | Stop reason: HOSPADM

## 2021-12-22 RX ORDER — ATORVASTATIN CALCIUM 40 MG/1
80 TABLET, FILM COATED ORAL DAILY
Status: DISCONTINUED | OUTPATIENT
Start: 2021-12-23 | End: 2021-12-24 | Stop reason: HOSPADM

## 2021-12-22 RX ORDER — LANOLIN ALCOHOL/MO/W.PET/CERES
1 CREAM (GRAM) TOPICAL DAILY
Status: DISCONTINUED | OUTPATIENT
Start: 2021-12-23 | End: 2021-12-24 | Stop reason: HOSPADM

## 2021-12-22 RX ORDER — HYDROCODONE BITARTRATE AND ACETAMINOPHEN 500; 5 MG/1; MG/1
TABLET ORAL
Status: DISCONTINUED | OUTPATIENT
Start: 2021-12-22 | End: 2021-12-24 | Stop reason: HOSPADM

## 2021-12-22 RX ORDER — SPIRONOLACTONE 25 MG/1
25 TABLET ORAL DAILY
Status: DISCONTINUED | OUTPATIENT
Start: 2021-12-23 | End: 2021-12-24 | Stop reason: HOSPADM

## 2021-12-22 RX ORDER — SIMETHICONE 80 MG
1 TABLET,CHEWABLE ORAL 4 TIMES DAILY PRN
Status: DISCONTINUED | OUTPATIENT
Start: 2021-12-22 | End: 2021-12-24 | Stop reason: HOSPADM

## 2021-12-22 RX ORDER — PREGABALIN 75 MG/1
150 CAPSULE ORAL 2 TIMES DAILY
Status: DISCONTINUED | OUTPATIENT
Start: 2021-12-22 | End: 2021-12-24 | Stop reason: HOSPADM

## 2021-12-22 RX ORDER — POLYETHYLENE GLYCOL 3350 17 G/17G
17 POWDER, FOR SOLUTION ORAL DAILY PRN
Status: DISCONTINUED | OUTPATIENT
Start: 2021-12-22 | End: 2021-12-24 | Stop reason: HOSPADM

## 2021-12-22 RX ORDER — IBUPROFEN 200 MG
24 TABLET ORAL
Status: DISCONTINUED | OUTPATIENT
Start: 2021-12-22 | End: 2021-12-24 | Stop reason: HOSPADM

## 2021-12-22 RX ORDER — GLUCAGON 1 MG
1 KIT INJECTION
Status: DISCONTINUED | OUTPATIENT
Start: 2021-12-22 | End: 2021-12-24 | Stop reason: HOSPADM

## 2021-12-22 RX ADMIN — PREGABALIN 150 MG: 75 CAPSULE ORAL at 08:12

## 2021-12-22 RX ADMIN — Medication 6 MG: at 08:12

## 2021-12-22 RX ADMIN — ACETAMINOPHEN 650 MG: 325 TABLET ORAL at 08:12

## 2021-12-22 RX ADMIN — FUROSEMIDE 20 MG: 20 TABLET ORAL at 08:12

## 2021-12-22 RX ADMIN — Medication 10 ML: at 11:12

## 2021-12-22 RX ADMIN — SODIUM CHLORIDE 500 ML: 0.9 INJECTION, SOLUTION INTRAVENOUS at 02:12

## 2021-12-22 NOTE — FIRST PROVIDER EVALUATION
Medical screening exam completed.  I have conducted a focused provider triage encounter, findings are as follows:    Brief history of present illness:  Pt sent for low h and h    Vitals:    12/22/21 1340   BP: (!) 123/54   BP Location: Right arm   Patient Position: Sitting   Pulse: 73   Resp: 18   Temp: 97.9 °F (36.6 °C)   TempSrc: Oral   SpO2: 99%   Weight: 102.1 kg (225 lb)         Preliminary workup initiated; this workup will be continued and followed by the physician or advanced practice provider that is assigned to the patient when roomed.

## 2021-12-22 NOTE — HPI
82 y/o male with PMHx of HTN, HLD, DM, CHF, CAD, a fib, and tobacco use who presented to the Ed with c/o weakness that onset gradually several days ago. Sx are constant and moderate in severity. No exacerbating or mitigating factors reported. Associated sx include KING, SOB, and red stool. Pt denies dizziness, HA, congestion, cough, palpitations, CP, abd pain, dysuria, ankle edema, and all other sx at this time. Pt is seen by Dr. Nath for Pernicious anemia, and has had extensive workup by GI for anemia with no source of bleeding identified. Pt follows Dr. Aquino for ckd.  ED workup shows: Occult stool negative, H/H 5.8/20.2, WBC 3.75K, RBC 2.35, Ferritin 15, BUN 28, Cr. 2.1, eGFR 29, glucose 122, troponin 0.064  CXR shows There is no focal pulmonary infiltrate visualized.  2. There is blunting of the right costophrenic angle.  This is characteristic of a tiny pleural effusion.  He is a full code and his SDM is his wife, Xuan  He will be kept on OBS for symptomatic anemia under the care of [facility] medicine.

## 2021-12-22 NOTE — ED PROVIDER NOTES
SCRIBE #1 NOTE: I, Papa Murphy, am scribing for, and in the presence of, Mamadou Wayne MD. I have scribed the entire note.      History      Chief Complaint   Patient presents with    Weakness     Weakness, SOB, told he needs a transfusion from cancer center        Review of patient's allergies indicates:  No Known Allergies     HPI   HPI    12/22/2021, 2:13 PM   History obtained from the patient      History of Present Illness: Jake Ortiz is a 81 y.o. male patient with a PMHx of CAD, CHF, anemia, DM, HTN, who presents to the Emergency Department for weakness which onset gradually couple days ago. On 12/17, pt reports he had blood work done and was told he needed a blood transfusion. Pt has a hx of blood transfusions and blood loss, which has unable to be identified where the blood loss is coming from after lower/upper scope. Pt states he is followed by Dr. Nath (Hem/Onc) where he receives B12 infusions. Pt c/o SOB with KING, and slight blood in stool. Pt states he is taking Plavix and Eliquis. Symptoms are constant and moderate in severity. No mitigating or exacerbating factors reported. Patient denies any fever, chills, N/V/D, constipation, dysuria, hematuria, HA, and all other sxs at this time. No prior Tx included. No further complaints or concerns at this time.       Arrival mode: Personal vehicle     PCP: Byron Stevens MD       Past Medical History:  Past Medical History:   Diagnosis Date    Abnormal PFT     Anemia     Arthritis     Atrial fibrillation 10/19/2017    Back pain     Congestive heart failure 3/5/2018    Coronary artery disease     DM (diabetes mellitus) 2018     am 06/23/2020    DM (diabetes mellitus) 2016     am 08/17/2021    Hyperlipemia     Hypertension     Myocardial infarction     Obesity     NASREEN (obstructive sleep apnea) 6/5/2018    Prostate cancer 2015    Tobacco dependence     Type 2 diabetes mellitus        Past Surgical History:  Past Surgical  History:   Procedure Laterality Date    COLONOSCOPY N/A 9/22/2020    Procedure: COLONOSCOPY;  Surgeon: Javier Farmer MD;  Location: [facility] ENDO;  Service: Endoscopy;  Laterality: N/A;    CORONARY ARTERY BYPASS GRAFT  1987    EPIDURAL STEROID INJECTION N/A 11/22/2019    Procedure: Lumbar L5/S1 IL XUAN;  Surgeon: Tacho Trivedi MD;  Location: [facility]GT;  Service: Pain Management;  Laterality: N/A;    EPIDURAL STEROID INJECTION N/A 1/6/2020    Procedure: Lumbar L5/S1 IL XUAN;  Surgeon: Tacho Trivedi MD;  Location: [facility];  Service: Pain Management;  Laterality: N/A;    EPIDURAL STEROID INJECTION N/A 2/10/2020    Procedure: Caudal XUAN;  Surgeon: Tacho Trivedi MD;  Location: [facility];  Service: Pain Management;  Laterality: N/A;    ESOPHAGOGASTRODUODENOSCOPY N/A 9/22/2020    Procedure: EGD (ESOPHAGOGASTRODUODENOSCOPY);  Surgeon: Javier Farmer MD;  Location: [facility] ENDO;  Service: Endoscopy;  Laterality: N/A;    INJECTION OF ANESTHETIC AGENT AROUND MEDIAL BRANCH NERVES INNERVATING LUMBAR FACET JOINT Bilateral 10/12/2020    Procedure: Bilateral L3-5 MBB;  Surgeon: Tacho Trivedi MD;  Location: [facility];  Service: Pain Management;  Laterality: Bilateral;    INJECTION OF ANESTHETIC AGENT AROUND MEDIAL BRANCH NERVES INNERVATING LUMBAR FACET JOINT Bilateral 12/21/2020    Procedure: Bilateral L3-5 MBB with steroid;  Surgeon: Tacho Trivedi MD;  Location: [facility];  Service: Pain Management;  Laterality: Bilateral;    PROSTATE SURGERY      prostate radiation    RADIOFREQUENCY THERMOCOAGULATION Left 6/4/2021    Procedure: Left L3-5 Lumbar RFA;  Surgeon: Tacho Trivedi MD;  Location: [facility];  Service: Pain Management;  Laterality: Left;    RADIOFREQUENCY THERMOCOAGULATION Right 6/18/2021    Procedure: Right L3-5 Lumbar RFA;  Surgeon: Tacho Trivedi MD;  Location: [facility];  Service: Pain Management;  Laterality: Right;    SPINE SURGERY       fusion    TONSILLECTOMY           Family History:  Family History   Problem Relation Age of Onset    Heart attack Mother     Diabetes Mother     Heart disease Mother     Cataracts Mother     Stroke Father     Heart disease Father     Heart disease Brother        Social History:  Social History     Tobacco Use    Smoking status: Former Smoker     Packs/day: 1.50     Years: 20.00     Pack years: 30.00     Types: Cigarettes     Start date:      Quit date:      Years since quittin.9    Smokeless tobacco: Never Used   Substance and Sexual Activity    Alcohol use: No    Drug use: No    Sexual activity: Not Currently       ROS   Review of Systems   Constitutional: Negative for chills and fever.   HENT: Negative for sore throat.    Respiratory: Positive for shortness of breath (w/ KING).    Cardiovascular: Negative for chest pain.   Gastrointestinal: Positive for blood in stool. Negative for constipation, diarrhea, nausea and vomiting.   Genitourinary: Negative for dysuria and hematuria.   Musculoskeletal: Negative for back pain.   Skin: Negative for rash.   Neurological: Positive for weakness. Negative for headaches.   Hematological: Does not bruise/bleed easily.   All other systems reviewed and are negative.    Physical Exam      Initial Vitals [21 1340]   BP Pulse Resp Temp SpO2   (!) 123/54 73 18 97.9 °F (36.6 °C) 99 %      MAP       --          Physical Exam  Nursing Notes and Vital Signs Reviewed.  Constitutional: Patient is in no acute distress. Well-developed and well-nourished. Well-hydrated.   Head: Atraumatic. Normocephalic.  Eyes: PERRL. EOM intact. Conjunctivae are not pale. No scleral icterus.  ENT: Mucous membranes are moist. Oropharynx is clear and symmetric.    Neck: Supple. Full ROM. No lymphadenopathy.  Cardiovascular: Regular rate. Regular rhythm. No murmurs, rubs, or gallops. Distal pulses are 2+ and symmetric.  Pulmonary/Chest: No respiratory distress. Clear to  auscultation bilaterally. No wheezing or rales.  Abdominal: Soft and non-distended.  There is no tenderness.  No rebound, guarding, or rigidity. Good bowel sounds.  Genitourinary: No CVA tenderness  Musculoskeletal: Moves all extremities. No obvious deformities. Trace pitting edema. No calf tenderness.  Skin: Warm and dry.  Neurological:  Alert, awake, and appropriate.  Normal speech.  No acute focal neurological deficits are appreciated.  Psychiatric: Normal affect. Good eye contact. Appropriate in content.    ED Course    Procedures  ED Vital Signs:  Vitals:    12/22/21 1340 12/22/21 1400 12/22/21 1430 12/22/21 1434   BP: (!) 123/54 (!) 122/58 (!) 113/53 (!) 113/56   Pulse: 73 79  60   Resp: 18      Temp: 97.9 °F (36.6 °C)      TempSrc: Oral      SpO2: 99% 99%     Weight: 102.1 kg (225 lb)       12/22/21 1436 12/22/21 1438 12/22/21 1440 12/22/21 1502   BP: (!) 112/58 (!) 105/55 (!) 105/55 (!) 117/57   Pulse: 63 67 74 61   Resp:    (!) 26   Temp:       TempSrc:       SpO2:   99% 97%   Weight:        12/22/21 1530   BP: (!) 119/57   Pulse: 66   Resp: (!) 21   Temp:    TempSrc:    SpO2: 100%   Weight:        Abnormal Lab Results:  Labs Reviewed   CBC W/ AUTO DIFFERENTIAL - Abnormal; Notable for the following components:       Result Value    WBC 3.75 (*)     RBC 2.35 (*)     Hemoglobin 5.8 (*)     Hematocrit 20.2 (*)     MCH 24.7 (*)     MCHC 28.7 (*)     RDW 18.7 (*)     Lymph # 0.5 (*)     nRBC 1 (*)     Lymph % 12.8 (*)     All other components within normal limits    Narrative:      HGB  critical result(s) called and verbal readback obtained from   Mary Jane poole RN by TD3 12/22/2021 15:06   COMPREHENSIVE METABOLIC PANEL - Abnormal; Notable for the following components:    Glucose 122 (*)     BUN 28 (*)     Creatinine 2.1 (*)     Calcium 8.4 (*)     eGFR if  34 (*)     eGFR if non  29 (*)     All other components within normal limits   TROPONIN I - Abnormal; Notable for the  following components:    Troponin I 0.064 (*)     All other components within normal limits   FERRITIN - Abnormal; Notable for the following components:    Ferritin 15 (*)     All other components within normal limits   OCCULT BLOOD X 1, STOOL   URINALYSIS, REFLEX TO URINE CULTURE   IRON AND TIBC   TYPE & SCREEN   PREPARE RBC SOFT        Results for orders placed or performed during the hospital encounter of 12/22/21   CBC auto differential   Result Value Ref Range    WBC 3.75 (L) 3.90 - 12.70 K/uL    RBC 2.35 (L) 4.60 - 6.20 M/uL    Hemoglobin 5.8 (LL) 14.0 - 18.0 g/dL    Hematocrit 20.2 (L) 40.0 - 54.0 %    MCV 86 82 - 98 fL    MCH 24.7 (L) 27.0 - 31.0 pg    MCHC 28.7 (L) 32.0 - 36.0 g/dL    RDW 18.7 (H) 11.5 - 14.5 %    Platelets 185 150 - 450 K/uL    MPV 9.3 9.2 - 12.9 fL    Immature Granulocytes 0.3 0.0 - 0.5 %    Gran # (ANC) 2.6 1.8 - 7.7 K/uL    Immature Grans (Abs) 0.01 0.00 - 0.04 K/uL    Lymph # 0.5 (L) 1.0 - 4.8 K/uL    Mono # 0.6 0.3 - 1.0 K/uL    Eos # 0.1 0.0 - 0.5 K/uL    Baso # 0.04 0.00 - 0.20 K/uL    nRBC 1 (A) 0 /100 WBC    Gran % 69.8 38.0 - 73.0 %    Lymph % 12.8 (L) 18.0 - 48.0 %    Mono % 14.7 4.0 - 15.0 %    Eosinophil % 1.3 0.0 - 8.0 %    Basophil % 1.1 0.0 - 1.9 %    Platelet Estimate Appears normal     Aniso Slight     Poik Slight     Hypo Occasional     Ovalocytes Occasional     Target Cells Occasional     Tear Drop Cells Occasional     Differential Method Automated    Comprehensive metabolic panel   Result Value Ref Range    Sodium 142 136 - 145 mmol/L    Potassium 3.6 3.5 - 5.1 mmol/L    Chloride 106 95 - 110 mmol/L    CO2 23 23 - 29 mmol/L    Glucose 122 (H) 70 - 110 mg/dL    BUN 28 (H) 8 - 23 mg/dL    Creatinine 2.1 (H) 0.5 - 1.4 mg/dL    Calcium 8.4 (L) 8.7 - 10.5 mg/dL    Total Protein 6.8 6.0 - 8.4 g/dL    Albumin 3.9 3.5 - 5.2 g/dL    Total Bilirubin 0.5 0.1 - 1.0 mg/dL    Alkaline Phosphatase 78 55 - 135 U/L    AST 26 10 - 40 U/L    ALT 18 10 - 44 U/L    Anion Gap 13 8 - 16  mmol/L    eGFR if African American 34 (A) >60 mL/min/1.73 m^2    eGFR if non African American 29 (A) >60 mL/min/1.73 m^2   Urinalysis, Reflex to Urine Culture Urine, Clean Catch    Specimen: Urine   Result Value Ref Range    Specimen UA Urine, Clean Catch     Color, UA Yellow Yellow, Straw, Beth    Appearance, UA Clear Clear    pH, UA 6.0 5.0 - 8.0    Specific Gravity, UA 1.020 1.005 - 1.030    Protein, UA Trace (A) Negative    Glucose, UA Negative Negative    Ketones, UA Negative Negative    Bilirubin (UA) Negative Negative    Occult Blood UA Negative Negative    Nitrite, UA Negative Negative    Urobilinogen, UA Negative <2.0 EU/dL    Leukocytes, UA Negative Negative   Troponin I   Result Value Ref Range    Troponin I 0.064 (H) 0.000 - 0.026 ng/mL   Ferritin   Result Value Ref Range    Ferritin 15 (L) 20.0 - 300.0 ng/mL   Iron and TIBC   Result Value Ref Range    Iron 12 (L) 45 - 160 ug/dL    Transferrin 307 200 - 375 mg/dL    TIBC 454 (H) 250 - 450 ug/dL    Saturated Iron 3 (L) 20 - 50 %   Occult blood x 1, stool    Specimen: Stool   Result Value Ref Range    Occult Blood Negative Negative   Troponin I   Result Value Ref Range    Troponin I 0.087 (H) 0.000 - 0.026 ng/mL   CBC with Automated Differential   Result Value Ref Range    WBC 4.44 3.90 - 12.70 K/uL    RBC 2.93 (L) 4.60 - 6.20 M/uL    Hemoglobin 7.4 (L) 14.0 - 18.0 g/dL    Hematocrit 25.2 (L) 40.0 - 54.0 %    MCV 86 82 - 98 fL    MCH 25.3 (L) 27.0 - 31.0 pg    MCHC 29.4 (L) 32.0 - 36.0 g/dL    RDW 17.6 (H) 11.5 - 14.5 %    Platelets 199 150 - 450 K/uL    MPV 10.3 9.2 - 12.9 fL    Immature Granulocytes 0.2 0.0 - 0.5 %    Gran # (ANC) 2.8 1.8 - 7.7 K/uL    Immature Grans (Abs) 0.01 0.00 - 0.04 K/uL    Lymph # 0.9 (L) 1.0 - 4.8 K/uL    Mono # 0.6 0.3 - 1.0 K/uL    Eos # 0.1 0.0 - 0.5 K/uL    Baso # 0.04 0.00 - 0.20 K/uL    nRBC 1 (A) 0 /100 WBC    Gran % 62.9 38.0 - 73.0 %    Lymph % 19.1 18.0 - 48.0 %    Mono % 14.0 4.0 - 15.0 %    Eosinophil % 2.9 0.0 -  8.0 %    Basophil % 0.9 0.0 - 1.9 %    Differential Method Automated    Basic Metabolic Panel   Result Value Ref Range    Sodium 142 136 - 145 mmol/L    Potassium 3.7 3.5 - 5.1 mmol/L    Chloride 110 95 - 110 mmol/L    CO2 22 (L) 23 - 29 mmol/L    Glucose 104 70 - 110 mg/dL    BUN 25 (H) 8 - 23 mg/dL    Creatinine 2.0 (H) 0.5 - 1.4 mg/dL    Calcium 8.1 (L) 8.7 - 10.5 mg/dL    Anion Gap 10 8 - 16 mmol/L    eGFR if African American 36 (A) >60 mL/min/1.73 m^2    eGFR if non African American 31 (A) >60 mL/min/1.73 m^2   Type & Screen   Result Value Ref Range    Group & Rh O POS     Indirect Keshav NEG    POCT glucose   Result Value Ref Range    POCT Glucose 120 (H) 70 - 110 mg/dL   Prepare RBC 2 Units; Symptomatic anemia   Result Value Ref Range    UNIT NUMBER [id number]     Product Code C5376I01     DISPENSE STATUS TRANSFUSED     CODING SYSTEM [id number]     Unit Blood Type Code 5100     Unit Blood Type O POS     Unit Expiration [id number]     UNIT NUMBER [id number]     Product Code N3223P96     DISPENSE STATUS TRANSFUSED     CODING SYSTEM [id number]     Unit Blood Type Code 5100     Unit Blood Type O POS     Unit Expiration [id number]    Prepare RBC 1 Unit   Result Value Ref Range    UNIT NUMBER [id number]     Product Code M1570E37     DISPENSE STATUS ISSUED     CODING SYSTEM [id number]     Unit Blood Type Code 5100     Unit Blood Type O POS     Unit Expiration [id number]       Imaging Results:  Imaging Results          X-Ray Chest AP Portable (Final result)  Result time 12/22/21 15:23:08    Final result by SARAH Aly Sr., MD (12/22/21 15:23:08)                 Impression:      1. There is no focal pulmonary infiltrate visualized.  2. There is blunting of the right costophrenic angle.  This is characteristic of a tiny pleural effusion.  3. There are surgical changes associated with a prior sternotomy. The borders of the heart are not well seen.  This is characteristic of pericardial fat  pads.  .      Electronically signed by: Jer Aly MD  Date:    12/22/2021  Time:    15:23             Narrative:    EXAMINATION:  XR CHEST AP PORTABLE    CLINICAL HISTORY:  Anemia, unspecified    COMPARISON:  10/18/2021    FINDINGS:  There are surgical changes associated with a prior sternotomy.  A cardiac pacemaker remains in place.  Its leads appear to be intact.  There is a mild amount of atherosclerosis.  The borders of the heart are not well seen.  There is no focal pulmonary infiltrate visualized.  There is blunting of the right costophrenic angle.  The left costophrenic angle was not completely included on the film.  There is no pneumothorax.                               The EKG was ordered, reviewed, and independently interpreted by the ED provider.  Interpretation time: 245 PM  Rate: 65 BPM  Rhythm: paced rhythm  Interpretation: Nonspecific ST-T wave abnormality. No STEMI.           The Emergency Provider reviewed the vital signs and test results, which are outlined above.    ED Discussion     Pts H/H cont. To fall from 5 days ago.  Will proceed c PRBC's.  Stool has reddish tint but heme neg at this time.  I discussed c Eliot morelos  and they agree c current management.  He requests I place the pt on Observation to Dr. Castellanos.    The pt and his wife understand the test results and are comfortable with the treatment plan at this time.               ED Medication(s):  Medications   0.9%  NaCl infusion (for blood administration) (has no administration in time range)   sodium chloride 0.9% bolus 500 mL (0 mLs Intravenous Stopped 12/22/21 1556)             Medical Decision Making              Scribe Attestation:   Scribe #1: I performed the above scribed service and the documentation accurately describes the services I performed. I attest to the accuracy of the note.    Attending:   Physician Attestation Statement for Scribe #1: I, Mamadou Wayne MD, personally performed the services described in this  documentation, as scribed by Papa Murphy, in my presence, and it is both accurate and complete.         Attending Attestation:         Attending Critical Care:   Critical Care Times:   Direct Patient Care (initial evaluation, reassessments, and time considering the case)................................................................15 minutes.   Additional History from reviewing old medical records or taking additional history from the family, EMS, PCP, etc.......................10 minutes.   Ordering, Reviewing, and Interpreting Diagnostic Studies...............................................................................................................10 minutes.   Documentation..................................................................................................................................................................................10 minutes.   Consultation with other Physicians. .................................................................................................................................................5 minutes.   Consultation with the patient's family directly relating to the patient's condition, care, and DNR status (when patient unable)......5 minutes.   ==============================================================  · Total Critical Care Time - exclusive of procedural time: 55 minutes.  ==============================================================  Critical care reasons: symptomatic anemia requiring transfusion.   Critical care was time spent personally by me on the following activities: obtaining history from patient or relative, examination of patient, review of old charts, ordering lab, x-rays, and/or EKG, development of treatment plan with patient or relative, ordering and performing treatments and interventions, evaluation of patient's response to treatment, discussions with primary provider, interpretation of cardiac measurements and re-evaluation of  patient's conition.   Critical Care Condition: potentially life-threatening           Clinical Impression       ICD-10-CM ICD-9-CM   1. Stage 4 chronic kidney disease  N18.4 585.4   2. Acute on chronic diastolic congestive heart failure  I50.33 428.33     428.0   3. DDD (degenerative disc disease), lumbar  M51.36 722.52   4. Near syncope  R55 780.2   5. Symptomatic anemia  D64.9 285.9       Disposition:   Disposition: Placed in Observation  Condition: Stable         Mamadou Wayne MD  12/23/21 [id number]

## 2021-12-23 PROBLEM — R79.89 ELEVATED TROPONIN: Status: ACTIVE | Noted: 2021-12-23

## 2021-12-23 LAB
ANION GAP SERPL CALC-SCNC: 10 MMOL/L (ref 8–16)
BASOPHILS # BLD AUTO: 0.04 K/UL (ref 0–0.2)
BASOPHILS # BLD AUTO: 0.04 K/UL (ref 0–0.2)
BASOPHILS NFR BLD: 0.7 % (ref 0–1.9)
BASOPHILS NFR BLD: 0.9 % (ref 0–1.9)
BLD PROD TYP BPU: NORMAL
BLOOD UNIT EXPIRATION DATE: NORMAL
BLOOD UNIT TYPE CODE: 5100
BLOOD UNIT TYPE: NORMAL
BUN SERPL-MCNC: 25 MG/DL (ref 8–23)
CALCIUM SERPL-MCNC: 8.1 MG/DL (ref 8.7–10.5)
CHLORIDE SERPL-SCNC: 110 MMOL/L (ref 95–110)
CO2 SERPL-SCNC: 22 MMOL/L (ref 23–29)
CODING SYSTEM: NORMAL
CREAT SERPL-MCNC: 2 MG/DL (ref 0.5–1.4)
DIFFERENTIAL METHOD: ABNORMAL
DIFFERENTIAL METHOD: ABNORMAL
DISPENSE STATUS: NORMAL
EOSINOPHIL # BLD AUTO: 0.1 K/UL (ref 0–0.5)
EOSINOPHIL # BLD AUTO: 0.1 K/UL (ref 0–0.5)
EOSINOPHIL NFR BLD: 2 % (ref 0–8)
EOSINOPHIL NFR BLD: 2.9 % (ref 0–8)
ERYTHROCYTE [DISTWIDTH] IN BLOOD BY AUTOMATED COUNT: 17.6 % (ref 11.5–14.5)
ERYTHROCYTE [DISTWIDTH] IN BLOOD BY AUTOMATED COUNT: 17.8 % (ref 11.5–14.5)
EST. GFR  (AFRICAN AMERICAN): 36 ML/MIN/1.73 M^2
EST. GFR  (NON AFRICAN AMERICAN): 31 ML/MIN/1.73 M^2
GLUCOSE SERPL-MCNC: 104 MG/DL (ref 70–110)
HCT VFR BLD AUTO: 25.2 % (ref 40–54)
HCT VFR BLD AUTO: 29.1 % (ref 40–54)
HGB BLD-MCNC: 7.4 G/DL (ref 14–18)
HGB BLD-MCNC: 8.8 G/DL (ref 14–18)
IMM GRANULOCYTES # BLD AUTO: 0.01 K/UL (ref 0–0.04)
IMM GRANULOCYTES # BLD AUTO: 0.03 K/UL (ref 0–0.04)
IMM GRANULOCYTES NFR BLD AUTO: 0.2 % (ref 0–0.5)
IMM GRANULOCYTES NFR BLD AUTO: 0.5 % (ref 0–0.5)
LYMPHOCYTES # BLD AUTO: 0.5 K/UL (ref 1–4.8)
LYMPHOCYTES # BLD AUTO: 0.9 K/UL (ref 1–4.8)
LYMPHOCYTES NFR BLD: 19.1 % (ref 18–48)
LYMPHOCYTES NFR BLD: 8 % (ref 18–48)
MCH RBC QN AUTO: 25.3 PG (ref 27–31)
MCH RBC QN AUTO: 25.6 PG (ref 27–31)
MCHC RBC AUTO-ENTMCNC: 29.4 G/DL (ref 32–36)
MCHC RBC AUTO-ENTMCNC: 30.2 G/DL (ref 32–36)
MCV RBC AUTO: 85 FL (ref 82–98)
MCV RBC AUTO: 86 FL (ref 82–98)
MONOCYTES # BLD AUTO: 0.6 K/UL (ref 0.3–1)
MONOCYTES # BLD AUTO: 0.6 K/UL (ref 0.3–1)
MONOCYTES NFR BLD: 11.4 % (ref 4–15)
MONOCYTES NFR BLD: 14 % (ref 4–15)
NEUTROPHILS # BLD AUTO: 2.8 K/UL (ref 1.8–7.7)
NEUTROPHILS # BLD AUTO: 4.3 K/UL (ref 1.8–7.7)
NEUTROPHILS NFR BLD: 62.9 % (ref 38–73)
NEUTROPHILS NFR BLD: 77.4 % (ref 38–73)
NRBC BLD-RTO: 1 /100 WBC
NRBC BLD-RTO: 1 /100 WBC
NUM UNITS TRANS PACKED RBC: NORMAL
PLATELET # BLD AUTO: 199 K/UL (ref 150–450)
PLATELET # BLD AUTO: 200 K/UL (ref 150–450)
PMV BLD AUTO: 10.3 FL (ref 9.2–12.9)
PMV BLD AUTO: 9.8 FL (ref 9.2–12.9)
POCT GLUCOSE: 119 MG/DL (ref 70–110)
POCT GLUCOSE: 120 MG/DL (ref 70–110)
POCT GLUCOSE: 120 MG/DL (ref 70–110)
POCT GLUCOSE: 202 MG/DL (ref 70–110)
POTASSIUM SERPL-SCNC: 3.7 MMOL/L (ref 3.5–5.1)
RBC # BLD AUTO: 2.93 M/UL (ref 4.6–6.2)
RBC # BLD AUTO: 3.44 M/UL (ref 4.6–6.2)
SODIUM SERPL-SCNC: 142 MMOL/L (ref 136–145)
TROPONIN I SERPL DL<=0.01 NG/ML-MCNC: 0.12 NG/ML (ref 0–0.03)
WBC # BLD AUTO: 4.44 K/UL (ref 3.9–12.7)
WBC # BLD AUTO: 5.61 K/UL (ref 3.9–12.7)

## 2021-12-23 PROCEDURE — 99214 PR OFFICE/OUTPT VISIT, EST, LEVL IV, 30-39 MIN: ICD-10-PCS | Mod: HCNC,,, | Performed by: INTERNAL MEDICINE

## 2021-12-23 PROCEDURE — 99220 PR INITIAL OBSERVATION CARE,LEVL III: ICD-10-PCS | Mod: HCNC,,, | Performed by: INTERNAL MEDICINE

## 2021-12-23 PROCEDURE — 80048 BASIC METABOLIC PNL TOTAL CA: CPT | Mod: HCNC | Performed by: NURSE PRACTITIONER

## 2021-12-23 PROCEDURE — 96361 HYDRATE IV INFUSION ADD-ON: CPT

## 2021-12-23 PROCEDURE — P9016 RBC LEUKOCYTES REDUCED: HCPCS | Mod: HCNC | Performed by: NURSE PRACTITIONER

## 2021-12-23 PROCEDURE — A4216 STERILE WATER/SALINE, 10 ML: HCPCS | Mod: HCNC | Performed by: NURSE PRACTITIONER

## 2021-12-23 PROCEDURE — 99214 OFFICE O/P EST MOD 30 MIN: CPT | Mod: HCNC,,, | Performed by: INTERNAL MEDICINE

## 2021-12-23 PROCEDURE — 25000003 PHARM REV CODE 250: Mod: HCNC | Performed by: NURSE PRACTITIONER

## 2021-12-23 PROCEDURE — 85025 COMPLETE CBC W/AUTO DIFF WBC: CPT | Mod: 91,HCNC | Performed by: NURSE PRACTITIONER

## 2021-12-23 PROCEDURE — 99220 PR INITIAL OBSERVATION CARE,LEVL III: CPT | Mod: HCNC,,, | Performed by: INTERNAL MEDICINE

## 2021-12-23 PROCEDURE — 36415 COLL VENOUS BLD VENIPUNCTURE: CPT | Mod: HCNC | Performed by: NURSE PRACTITIONER

## 2021-12-23 PROCEDURE — G0378 HOSPITAL OBSERVATION PER HR: HCPCS | Mod: HCNC

## 2021-12-23 PROCEDURE — 63600175 PHARM REV CODE 636 W HCPCS: Mod: HCNC | Performed by: NURSE PRACTITIONER

## 2021-12-23 PROCEDURE — 96372 THER/PROPH/DIAG INJ SC/IM: CPT | Mod: 59

## 2021-12-23 PROCEDURE — 84484 ASSAY OF TROPONIN QUANT: CPT | Mod: HCNC | Performed by: NURSE PRACTITIONER

## 2021-12-23 PROCEDURE — 36430 TRANSFUSION BLD/BLD COMPNT: CPT | Mod: HCNC

## 2021-12-23 PROCEDURE — 96365 THER/PROPH/DIAG IV INF INIT: CPT

## 2021-12-23 RX ORDER — SODIUM CHLORIDE 9 MG/ML
INJECTION, SOLUTION INTRAVENOUS CONTINUOUS
Status: DISCONTINUED | OUTPATIENT
Start: 2021-12-23 | End: 2021-12-24 | Stop reason: HOSPADM

## 2021-12-23 RX ORDER — HYDROCODONE BITARTRATE AND ACETAMINOPHEN 500; 5 MG/1; MG/1
TABLET ORAL
Status: DISCONTINUED | OUTPATIENT
Start: 2021-12-23 | End: 2021-12-24 | Stop reason: HOSPADM

## 2021-12-23 RX ADMIN — INSULIN ASPART 2 UNITS: 100 INJECTION, SOLUTION INTRAVENOUS; SUBCUTANEOUS at 05:12

## 2021-12-23 RX ADMIN — IRON SUCROSE 200 MG: 20 INJECTION, SOLUTION INTRAVENOUS at 01:12

## 2021-12-23 RX ADMIN — PREGABALIN 150 MG: 75 CAPSULE ORAL at 08:12

## 2021-12-23 RX ADMIN — CLONAZEPAM 1 MG: 1 TABLET ORAL at 08:12

## 2021-12-23 RX ADMIN — ATORVASTATIN CALCIUM 80 MG: 40 TABLET, FILM COATED ORAL at 08:12

## 2021-12-23 RX ADMIN — PANTOPRAZOLE SODIUM 40 MG: 40 TABLET, DELAYED RELEASE ORAL at 08:12

## 2021-12-23 RX ADMIN — CLONAZEPAM 1 MG: 1 TABLET ORAL at 02:12

## 2021-12-23 RX ADMIN — FERROUS SULFATE TAB 325 MG (65 MG ELEMENTAL FE) 1 EACH: 325 (65 FE) TAB at 08:12

## 2021-12-23 RX ADMIN — Medication 10 ML: at 10:12

## 2021-12-23 RX ADMIN — MULTIPLE VITAMINS W/ MINERALS TAB 1 TABLET: TAB at 08:12

## 2021-12-23 RX ADMIN — Medication 10 ML: at 01:12

## 2021-12-23 RX ADMIN — SPIRONOLACTONE 25 MG: 25 TABLET ORAL at 08:12

## 2021-12-23 RX ADMIN — CHOLECALCIFEROL TAB 125 MCG (5000 UNIT) 5000 UNITS: 125 TAB at 08:12

## 2021-12-23 RX ADMIN — FUROSEMIDE 20 MG: 20 TABLET ORAL at 08:12

## 2021-12-23 RX ADMIN — Medication 10 ML: at 08:12

## 2021-12-23 RX ADMIN — SODIUM CHLORIDE: 0.9 INJECTION, SOLUTION INTRAVENOUS at 02:12

## 2021-12-23 NOTE — ASSESSMENT & PLAN NOTE
--troponin 0.064>>0.087>>0.119  --denies CP, has KING  --holding eliquis 2/2 gi bleed  --cardiology consulted

## 2021-12-23 NOTE — ASSESSMENT & PLAN NOTE
FOBT negative. Patient received Eliquis and Plavix yesterday. Tolerating diet. No plan for endoscopy this admission.   - He primary needs the VCE scheduled for next week.   - Agree with transfusion. Monitor H/H.   - Agree with Hematology consult to see if IV iron indicated.

## 2021-12-23 NOTE — CONSULTS
[facility] - Telemetry ([facility])  Gastroenterology  Consult Note    Patient Name: Jake Ortiz  MRN: [id number]  Admission Date: 12/22/2021  Hospital Length of Stay: 0 days  Code Status: Full Code   Attending Provider: Albaro Castellanos MD   Consulting Provider: Ke Valdes PA-C  Primary Care Physician: Byron Stevens MD  Principal Problem:Symptomatic anemia    Inpatient consult to Gastroenterology  Consult performed by: Ke Valdes PA-C  Consult ordered by: DANIEL Melgoza  Reason for consult: symptomatic anemia        Subjective:     HPI:  The patient has a history of iron deficiency anemia followed by Hematology and GI. The patient presented to the ER with altered mental status, SOB and drop in Hgb. In the ER, his Hgb was 5.8. He received two units of blood with a repeat Hgb of 7.4. He is feeling better today and mental status back to baseline. He reported having a red stool four days ago which was intermittent. He thinks it may have been related to cherries he ate. His FOBT was negative. BUN 28 and creatinine 2.1. Vitals stable. He denies melena, abdominal pain, nausea or vomiting. He had an unremarkable EGD and colonoscopy in 2020 as part of his anemia workup. CTE was also unrevealing (07/2021). He is scheduled for a video capsule next week. He is on Plavix and ASA at home. He gets erythropoietin from Hematology. He has received IV iron in the past but not sure he has had any recently. Per patient, he doesn't take oral iron regularly.      Past Medical History:   Diagnosis Date    Abnormal PFT     Anemia     Arthritis     Atrial fibrillation 10/19/2017    Back pain     Congestive heart failure 3/5/2018    Coronary artery disease     DM (diabetes mellitus) 2018     am 06/23/2020    DM (diabetes mellitus) 2016     am 08/17/2021    Hyperlipemia     Hypertension     Myocardial infarction     Obesity     NASREEN (obstructive sleep apnea) 6/5/2018    Prostate cancer 2015     Tobacco dependence     Type 2 diabetes mellitus        Past Surgical History:   Procedure Laterality Date    COLONOSCOPY N/A 9/22/2020    Procedure: COLONOSCOPY;  Surgeon: Javier Farmer MD;  Location: [facility] ENDO;  Service: Endoscopy;  Laterality: N/A;    CORONARY ARTERY BYPASS GRAFT  1987    EPIDURAL STEROID INJECTION N/A 11/22/2019    Procedure: Lumbar L5/S1 IL XUAN;  Surgeon: Tacho Trivedi MD;  Location: [facility]GT;  Service: Pain Management;  Laterality: N/A;    EPIDURAL STEROID INJECTION N/A 1/6/2020    Procedure: Lumbar L5/S1 IL XUAN;  Surgeon: Tacho Trivedi MD;  Location: [facility];  Service: Pain Management;  Laterality: N/A;    EPIDURAL STEROID INJECTION N/A 2/10/2020    Procedure: Caudal XUAN;  Surgeon: Tacho Trivedi MD;  Location: [facility];  Service: Pain Management;  Laterality: N/A;    ESOPHAGOGASTRODUODENOSCOPY N/A 9/22/2020    Procedure: EGD (ESOPHAGOGASTRODUODENOSCOPY);  Surgeon: Javier Farmer MD;  Location: [facility] ENDO;  Service: Endoscopy;  Laterality: N/A;    INJECTION OF ANESTHETIC AGENT AROUND MEDIAL BRANCH NERVES INNERVATING LUMBAR FACET JOINT Bilateral 10/12/2020    Procedure: Bilateral L3-5 MBB;  Surgeon: Tacho Trivedi MD;  Location: [facility];  Service: Pain Management;  Laterality: Bilateral;    INJECTION OF ANESTHETIC AGENT AROUND MEDIAL BRANCH NERVES INNERVATING LUMBAR FACET JOINT Bilateral 12/21/2020    Procedure: Bilateral L3-5 MBB with steroid;  Surgeon: Tacho Trivedi MD;  Location: [facility];  Service: Pain Management;  Laterality: Bilateral;    PROSTATE SURGERY      prostate radiation    RADIOFREQUENCY THERMOCOAGULATION Left 6/4/2021    Procedure: Left L3-5 Lumbar RFA;  Surgeon: Tacho Trivedi MD;  Location: [facility];  Service: Pain Management;  Laterality: Left;    RADIOFREQUENCY THERMOCOAGULATION Right 6/18/2021    Procedure: Right L3-5 Lumbar RFA;  Surgeon: Tacho Trivedi MD;  Location: [facility];   Service: Pain Management;  Laterality: Right;    SPINE SURGERY      fusion    TONSILLECTOMY         Review of patient's allergies indicates:  No Known Allergies  Family History     Problem Relation (Age of Onset)    Cataracts Mother    Diabetes Mother    Heart attack Mother    Heart disease Mother, Father, Brother    Stroke Father        Tobacco Use    Smoking status: Former Smoker     Packs/day: 1.50     Years: 20.00     Pack years: 30.00     Types: Cigarettes     Start date:      Quit date:      Years since quittin.0    Smokeless tobacco: Never Used   Substance and Sexual Activity    Alcohol use: No    Drug use: No    Sexual activity: Not Currently     Review of Systems   Constitutional: Positive for appetite change. Negative for fever.   HENT: Negative for hearing loss.    Eyes: Negative for visual disturbance.   Respiratory: Negative for cough and shortness of breath.    Cardiovascular: Negative for chest pain and palpitations.   Gastrointestinal:        As per HPI.   Genitourinary: Negative for difficulty urinating, dysuria, frequency and hematuria.   Musculoskeletal: Negative for arthralgias and back pain.   Skin: Negative for color change and rash.   Neurological: Positive for weakness. Negative for seizures, syncope, numbness and headaches.   Hematological: Does not bruise/bleed easily.   Psychiatric/Behavioral: The patient is not nervous/anxious.      Objective:     Vital Signs (Most Recent):  Temp: 97.8 °F (36.6 °C) (21 0755)  Pulse: 67 (21 0755)  Resp: 16 (21 075)  BP: (!) 144/69 (21 0755)  SpO2: 98 % (21 0755) Vital Signs (24h Range):  Temp:  [97.6 °F (36.4 °C)-98.1 °F (36.7 °C)] 97.8 °F (36.6 °C)  Pulse:  [60-89] 67  Resp:  [16-28] 16  SpO2:  [95 %-100 %] 98 %  BP: (105-151)/(53-72) 144/69     Weight: 99.2 kg (218 lb 11.1 oz) (21 0409)  Body mass index is 32.3 kg/m².      Intake/Output Summary (Last 24 hours) at 2021 0901  Last data filed at  12/23/2021 0800  Gross per 24 hour   Intake 1540.5 ml   Output 700 ml   Net 840.5 ml       Lines/Drains/Airways     Peripheral Intravenous Line                 Peripheral IV - Single Lumen 12/22/21 1445 18 G Left Antecubital <1 day         Peripheral IV - Single Lumen 12/22/21 1813 20 G Right Antecubital <1 day                Physical Exam  Constitutional:       Appearance: He is well-developed. He is not ill-appearing.   HENT:      Head: Normocephalic and atraumatic.   Eyes:      Extraocular Movements: Extraocular movements intact.   Cardiovascular:      Rate and Rhythm: Normal rate and regular rhythm.      Heart sounds: Normal heart sounds. No murmur heard.      Pulmonary:      Effort: Pulmonary effort is normal. No respiratory distress.      Breath sounds: Normal breath sounds. No wheezing.   Abdominal:      General: Bowel sounds are normal. There is no distension.      Palpations: Abdomen is soft. There is no hepatomegaly or mass.      Tenderness: There is no abdominal tenderness.   Musculoskeletal:      Cervical back: Normal range of motion and neck supple.      Right lower leg: No edema.      Left lower leg: No edema.   Skin:     General: Skin is warm and dry.      Findings: No rash.   Neurological:      Mental Status: He is alert and oriented to person, place, and time.      Cranial Nerves: No cranial nerve deficit.   Psychiatric:         Behavior: Behavior normal.         Significant Labs:  CBC:   Recent Labs   Lab 12/22/21  1445 12/23/21  0503   WBC 3.75* 4.44   HGB 5.8* 7.4*   HCT 20.2* 25.2*    199     CMP:   Recent Labs   Lab 12/22/21  1445 12/22/21  1445 12/23/21  0503   *   < > 104   CALCIUM 8.4*   < > 8.1*   ALBUMIN 3.9  --   --    PROT 6.8  --   --       < > 142   K 3.6   < > 3.7   CO2 23   < > 22*      < > 110   BUN 28*   < > 25*   CREATININE 2.1*   < > 2.0*   ALKPHOS 78  --   --    ALT 18  --   --    AST 26  --   --    BILITOT 0.5  --   --     < > = values in this interval  not displayed.     Coagulation: No results for input(s): PT, INR, APTT in the last 48 hours.    Significant Imaging:  Imaging results within the past 24 hours have been reviewed.    Assessment/Plan:     * Symptomatic anemia  FOBT negative. Patient received Eliquis and Plavix yesterday. Tolerating diet. No plan for endoscopy this admission.   - He primary needs the VCE scheduled for next week.   - Agree with transfusion. Monitor H/H.   - Agree with Hematology consult to see if IV iron indicated.     VAIBHAV (acute kidney injury)  Management per . Monitor.     Atrial fibrillation   or Cards to decide on anticoagulation/antiplatelet therapy.        Thank you for your consult. I will sign off. Please contact us if you have any additional questions.    Ke Valdes PA-C  Gastroenterology  [facility] - Telemetry ([facility])

## 2021-12-23 NOTE — HOSPITAL COURSE
Patient was kept on OBS for symptomatic anemia under the care of hospital medicine.  12/23: Hgb increased to 7.4 overnight with transfusion of 2u pRBC. Pt is still very tired. Troponin increased to 0.118, pt mukesh CP, palpitations, but admits to SOB with exertion. cardiology consulted. GI feels like no indication for intervention, will continue OP f/u for VCE that is scheduled for next week, they have signed off. Heme/onc following - will start IV iron and continue OP f/u as scheduled.  12/24/21 Hemoglobin stable. Will have VCE completed outpatient per GI. Okay to resume Eliquis 2.5mg daily BID. He was asked to hold Plavix and start ASA for CAD until after GI test.

## 2021-12-23 NOTE — ASSESSMENT & PLAN NOTE
--SOB and KING  --hgb 5.8 on admit  --transfuse 2u pRBC  --repeat cbc in AM  --has had extensive workup with GI and heme/onc  --occult stool negative  --pt reports red stool - but not blood streaked, entire BM was red, he reports it could have been from his diet/strawberry ice cream  --hold eliquis  --GI & Heme/onc consulted  12/23:  --Hgb increased to 7.4 today, transfuse another u pRBC

## 2021-12-23 NOTE — SUBJECTIVE & OBJECTIVE
Past Medical History:   Diagnosis Date    Abnormal PFT     Anemia     Arthritis     Atrial fibrillation 10/19/2017    Back pain     Congestive heart failure 3/5/2018    Coronary artery disease     DM (diabetes mellitus) 2018     am 06/23/2020    DM (diabetes mellitus) 2016     am 08/17/2021    Hyperlipemia     Hypertension     Myocardial infarction     Obesity     NASREEN (obstructive sleep apnea) 6/5/2018    Prostate cancer 2015    Tobacco dependence     Type 2 diabetes mellitus        Past Surgical History:   Procedure Laterality Date    COLONOSCOPY N/A 9/22/2020    Procedure: COLONOSCOPY;  Surgeon: Javier Farmer MD;  Location: [facility] ENDO;  Service: Endoscopy;  Laterality: N/A;    CORONARY ARTERY BYPASS GRAFT  1987    EPIDURAL STEROID INJECTION N/A 11/22/2019    Procedure: Lumbar L5/S1 IL XUAN;  Surgeon: Tacho Trivedi MD;  Location: [facility];  Service: Pain Management;  Laterality: N/A;    EPIDURAL STEROID INJECTION N/A 1/6/2020    Procedure: Lumbar L5/S1 IL XUAN;  Surgeon: Tacho Trivedi MD;  Location: [facility];  Service: Pain Management;  Laterality: N/A;    EPIDURAL STEROID INJECTION N/A 2/10/2020    Procedure: Caudal XUAN;  Surgeon: Tacho Trivedi MD;  Location: [facility];  Service: Pain Management;  Laterality: N/A;    ESOPHAGOGASTRODUODENOSCOPY N/A 9/22/2020    Procedure: EGD (ESOPHAGOGASTRODUODENOSCOPY);  Surgeon: Javier Farmer MD;  Location: [facility] ENDO;  Service: Endoscopy;  Laterality: N/A;    INJECTION OF ANESTHETIC AGENT AROUND MEDIAL BRANCH NERVES INNERVATING LUMBAR FACET JOINT Bilateral 10/12/2020    Procedure: Bilateral L3-5 MBB;  Surgeon: Tacho Trivedi MD;  Location: [facility];  Service: Pain Management;  Laterality: Bilateral;    INJECTION OF ANESTHETIC AGENT AROUND MEDIAL BRANCH NERVES INNERVATING LUMBAR FACET JOINT Bilateral 12/21/2020    Procedure: Bilateral L3-5 MBB with steroid;  Surgeon: Tacho Trivedi MD;   Location: [facility];  Service: Pain Management;  Laterality: Bilateral;    PROSTATE SURGERY      prostate radiation    RADIOFREQUENCY THERMOCOAGULATION Left 2021    Procedure: Left L3-5 Lumbar RFA;  Surgeon: Tacho Trivedi MD;  Location: [facility];  Service: Pain Management;  Laterality: Left;    RADIOFREQUENCY THERMOCOAGULATION Right 2021    Procedure: Right L3-5 Lumbar RFA;  Surgeon: Tacho Trivedi MD;  [facility];  Service: Pain Management;  Laterality: Right;    SPINE SURGERY      fusion    TONSILLECTOMY         Review of patient's allergies indicates:  No Known Allergies  Family History     Problem Relation (Age of Onset)    Cataracts Mother    Diabetes Mother    Heart attack Mother    Heart disease Mother, Father, Brother    Stroke Father        Tobacco Use    Smoking status: Former Smoker     Packs/day: 1.50     Years: 20.00     Pack years: 30.00     Types: Cigarettes     Start date:      Quit date:      Years since quittin.0    Smokeless tobacco: Never Used   Substance and Sexual Activity    Alcohol use: No    Drug use: No    Sexual activity: Not Currently     Review of Systems   Constitutional: Positive for appetite change. Negative for fever.   HENT: Negative for hearing loss.    Eyes: Negative for visual disturbance.   Respiratory: Negative for cough and shortness of breath.    Cardiovascular: Negative for chest pain and palpitations.   Gastrointestinal:        As per HPI.   Genitourinary: Negative for difficulty urinating, dysuria, frequency and hematuria.   Musculoskeletal: Negative for arthralgias and back pain.   Skin: Negative for color change and rash.   Neurological: Positive for weakness. Negative for seizures, syncope, numbness and headaches.   Hematological: Does not bruise/bleed easily.   Psychiatric/Behavioral: The patient is not nervous/anxious.      Objective:     Vital Signs (Most Recent):  Temp: 97.8 °F (36.6 °C) (21  0755)  Pulse: 67 (12/23/21 0755)  Resp: 16 (12/23/21 0755)  BP: (!) 144/69 (12/23/21 0755)  SpO2: 98 % (12/23/21 0755) Vital Signs (24h Range):  Temp:  [97.6 °F (36.4 °C)-98.1 °F (36.7 °C)] 97.8 °F (36.6 °C)  Pulse:  [60-89] 67  Resp:  [16-28] 16  SpO2:  [95 %-100 %] 98 %  BP: (105-151)/(53-72) 144/69     Weight: 99.2 kg (218 lb 11.1 oz) (12/23/21 0409)  Body mass index is 32.3 kg/m².      Intake/Output Summary (Last 24 hours) at 12/23/2021 0901  Last data filed at 12/23/2021 0800  Gross per 24 hour   Intake 1540.5 ml   Output 700 ml   Net 840.5 ml       Lines/Drains/Airways     Peripheral Intravenous Line                 Peripheral IV - Single Lumen 12/22/21 1445 18 G Left Antecubital <1 day         Peripheral IV - Single Lumen 12/22/21 1813 20 G Right Antecubital <1 day                Physical Exam  Constitutional:       Appearance: He is well-developed. He is not ill-appearing.   HENT:      Head: Normocephalic and atraumatic.   Eyes:      Extraocular Movements: Extraocular movements intact.   Cardiovascular:      Rate and Rhythm: Normal rate and regular rhythm.      Heart sounds: Normal heart sounds. No murmur heard.      Pulmonary:      Effort: Pulmonary effort is normal. No respiratory distress.      Breath sounds: Normal breath sounds. No wheezing.   Abdominal:      General: Bowel sounds are normal. There is no distension.      Palpations: Abdomen is soft. There is no hepatomegaly or mass.      Tenderness: There is no abdominal tenderness.   Musculoskeletal:      Cervical back: Normal range of motion and neck supple.      Right lower leg: No edema.      Left lower leg: No edema.   Skin:     General: Skin is warm and dry.      Findings: No rash.   Neurological:      Mental Status: He is alert and oriented to person, place, and time.      Cranial Nerves: No cranial nerve deficit.   Psychiatric:         Behavior: Behavior normal.         Significant Labs:  CBC:   Recent Labs   Lab 12/22/21  1445 12/23/21  0503    WBC 3.75* 4.44   HGB 5.8* 7.4*   HCT 20.2* 25.2*    199     CMP:   Recent Labs   Lab 12/22/21  1445 12/22/21  1445 12/23/21  0503   *   < > 104   CALCIUM 8.4*   < > 8.1*   ALBUMIN 3.9  --   --    PROT 6.8  --   --       < > 142   K 3.6   < > 3.7   CO2 23   < > 22*      < > 110   BUN 28*   < > 25*   CREATININE 2.1*   < > 2.0*   ALKPHOS 78  --   --    ALT 18  --   --    AST 26  --   --    BILITOT 0.5  --   --     < > = values in this interval not displayed.     Coagulation: No results for input(s): PT, INR, APTT in the last 48 hours.    Significant Imaging:  Imaging results within the past 24 hours have been reviewed.

## 2021-12-23 NOTE — SUBJECTIVE & OBJECTIVE
Oncology Treatment Plan:   [No treatment plan]    Medications:  Continuous Infusions:  Scheduled Meds:   atorvastatin  80 mg Oral Daily    cholecalciferol (vitamin D3)  5,000 Units Oral Daily    ferrous sulfate  1 tablet Oral Daily    furosemide  20 mg Oral BID    iron sucrose (VENOFER) IVPB  200 mg Intravenous Once    multivitamin  1 tablet Oral Daily    pantoprazole  40 mg Oral Daily    pregabalin  150 mg Oral BID    sodium chloride 0.9%  10 mL Intravenous Q8H    spironolactone  25 mg Oral Daily     PRN Meds:sodium chloride, sodium chloride, acetaminophen, albuterol, clonazePAM, dextrose 50%, dextrose 50%, glucagon (human recombinant), glucose, glucose, hydrALAZINE, insulin aspart U-100, melatonin, naloxone, ondansetron, polyethylene glycol, promethazine, simethicone     Review of patient's allergies indicates:  No Known Allergies     Past Medical History:   Diagnosis Date    Abnormal PFT     Anemia     Arthritis     Atrial fibrillation 10/19/2017    Back pain     Congestive heart failure 3/5/2018    Coronary artery disease     DM (diabetes mellitus) 2018     am 06/23/2020    DM (diabetes mellitus) 2016     am 08/17/2021    Hyperlipemia     Hypertension     Myocardial infarction     Obesity     NASREEN (obstructive sleep apnea) 6/5/2018    Prostate cancer 2015    Tobacco dependence     Type 2 diabetes mellitus      Past Surgical History:   Procedure Laterality Date    COLONOSCOPY N/A 9/22/2020    Procedure: COLONOSCOPY;  Surgeon: Javier Farmer MD;  [facility];  Service: Endoscopy;  Laterality: N/A;    CORONARY ARTERY BYPASS GRAFT  1987    EPIDURAL STEROID INJECTION N/A 11/22/2019    Procedure: Lumbar L5/S1 IL XUAN;  Surgeon: Tacho Trivedi MD;  Location: [facility] PAIN [facility]GT;  Service: Pain Management;  Laterality: N/A;    EPIDURAL STEROID INJECTION N/A 1/6/2020    Procedure: Lumbar L5/S1 IL XUAN;  Surgeon: Tacho Trivedi MD;  Location: [facility] PAIN [facility];   Service: Pain Management;  Laterality: N/A;    EPIDURAL STEROID INJECTION N/A 2/10/2020    Procedure: Caudal XUAN;  Surgeon: Tacho Trivedi MD;  Location: [facility] PAIN MGT;  Service: Pain Management;  Laterality: N/A;    ESOPHAGOGASTRODUODENOSCOPY N/A 2020    Procedure: EGD (ESOPHAGOGASTRODUODENOSCOPY);  Surgeon: Javier Farmer MD;  Location: [facility] ENDO;  Service: Endoscopy;  Laterality: N/A;    INJECTION OF ANESTHETIC AGENT AROUND MEDIAL BRANCH NERVES INNERVATING LUMBAR FACET JOINT Bilateral 10/12/2020    Procedure: Bilateral L3-5 MBB;  Surgeon: Tacho Trivedi MD;  Location: [facility] PAIN MGT;  Service: Pain Management;  Laterality: Bilateral;    INJECTION OF ANESTHETIC AGENT AROUND MEDIAL BRANCH NERVES INNERVATING LUMBAR FACET JOINT Bilateral 2020    Procedure: Bilateral L3-5 MBB with steroid;  Surgeon: Tacho Trivedi MD;  Location: [facility] PAIN [facility]GT;  Service: Pain Management;  Laterality: Bilateral;    PROSTATE SURGERY      prostate radiation    RADIOFREQUENCY THERMOCOAGULATION Left 2021    Procedure: Left L3-5 Lumbar RFA;  Surgeon: Tacho Trivedi MD;  Location: [facility] PAIN MGT;  Service: Pain Management;  Laterality: Left;    RADIOFREQUENCY THERMOCOAGULATION Right 2021    Procedure: Right L3-5 Lumbar RFA;  Surgeon: Tacho Trivedi MD;  Location: [facility] PAIN [facility]GT;  Service: Pain Management;  Laterality: Right;    SPINE SURGERY      fusion    TONSILLECTOMY       Family History     Problem Relation (Age of Onset)    Cataracts Mother    Diabetes Mother    Heart attack Mother    Heart disease Mother, Father, Brother    Stroke Father        Tobacco Use    Smoking status: Former Smoker     Packs/day: 1.50     Years: 20.00     Pack years: 30.00     Types: Cigarettes     Start date:      Quit date:      Years since quittin.0    Smokeless tobacco: Never Used   Substance and Sexual Activity    Alcohol use: No    Drug use: No    Sexual activity: Not Currently        Review of Systems   Constitutional: Positive for activity change and fatigue. Negative for appetite change, chills, fever and unexpected weight change.   HENT: Negative for congestion, mouth sores, nosebleeds, sore throat, trouble swallowing and voice change.    Eyes: Negative for photophobia and visual disturbance.   Respiratory: Positive for shortness of breath (with exertion). Negative for cough, chest tightness and wheezing.    Cardiovascular: Negative for chest pain and leg swelling.   Gastrointestinal: Negative for abdominal distention, abdominal pain, anal bleeding, blood in stool, constipation, diarrhea, nausea and vomiting.   Genitourinary: Negative for difficulty urinating, dysuria and hematuria.   Musculoskeletal: Negative for arthralgias, back pain and myalgias.   Skin: Negative for pallor, rash and wound.   Neurological: Positive for weakness. Negative for dizziness, syncope and headaches.   Hematological: Negative for adenopathy. Does not bruise/bleed easily.   Psychiatric/Behavioral: The patient is not nervous/anxious.      Objective:     Vital Signs (Most Recent):  Temp: 97.8 °F (36.6 °C) (12/23/21 0930)  Pulse: 61 (12/23/21 0930)  Resp: 18 (12/23/21 0930)  BP: 132/62 (12/23/21 0930)  SpO2: 97 % (12/23/21 0930) Vital Signs (24h Range):  Temp:  [97.6 °F (36.4 °C)-98.1 °F (36.7 °C)] 97.8 °F (36.6 °C)  Pulse:  [60-89] 61  Resp:  [16-28] 18  SpO2:  [95 %-100 %] 97 %  BP: (105-151)/(53-72) 132/62     Weight: 99.2 kg (218 lb 11.1 oz)  Body mass index is 32.3 kg/m².  Body surface area is 2.2 meters squared.      Intake/Output Summary (Last 24 hours) at 12/23/2021 1155  Last data filed at 12/23/2021 0800  Gross per 24 hour   Intake 1540.5 ml   Output 700 ml   Net 840.5 ml       Physical Exam  Vitals reviewed.   Constitutional:       Appearance: He is well-developed and well-nourished.   HENT:      Head: Normocephalic.      Right Ear: External ear normal.      Left Ear: External ear normal.       Mouth/Throat:      Mouth: Oropharynx is clear and moist.   Eyes:      General: Lids are normal. No scleral icterus.        Right eye: No discharge.         Left eye: No discharge.      Extraocular Movements: EOM normal.      Conjunctiva/sclera: Conjunctivae normal.   Neck:      Thyroid: No thyroid mass.   Cardiovascular:      Rate and Rhythm: Normal rate and regular rhythm.      Heart sounds: Normal heart sounds. No murmur heard.      Pulmonary:      Effort: Pulmonary effort is normal. No respiratory distress.      Breath sounds: Normal breath sounds. No wheezing or rales.   Abdominal:      General: Bowel sounds are normal. There is no distension.      Palpations: Abdomen is soft.      Tenderness: There is no abdominal tenderness.   Genitourinary:     Comments: deferred  Musculoskeletal:         General: No edema. Normal range of motion.      Cervical back: Normal range of motion.   Lymphadenopathy:      Head:      Right side of head: No submandibular, preauricular or posterior auricular adenopathy.      Left side of head: No submandibular, preauricular or posterior auricular adenopathy.      Cervical:      Right cervical: No superficial cervical adenopathy.     Left cervical: No superficial cervical adenopathy.   Skin:     General: Skin is warm, dry and intact.   Neurological:      Mental Status: He is alert and oriented to person, place, and time.   Psychiatric:         Mood and Affect: Mood and affect normal.         Speech: Speech normal.         Behavior: Behavior normal. Behavior is cooperative.         Thought Content: Thought content normal.         Significant Labs:   BMP:   Recent Labs   Lab 12/22/21  1445 12/23/21  0503   * 104    142   K 3.6 3.7    110   CO2 23 22*   BUN 28* 25*   CREATININE 2.1* 2.0*   CALCIUM 8.4* 8.1*   , CBC:   Recent Labs   Lab 12/22/21  1445 12/23/21  0503   WBC 3.75* 4.44   HGB 5.8* 7.4*   HCT 20.2* 25.2*    199   , LFTs:   Recent Labs   Lab  12/22/21  1445   ALT 18   AST 26   ALKPHOS 78   BILITOT 0.5   PROT 6.8   ALBUMIN 3.9    and All pertinent labs from the last 24 hours have been reviewed.    Diagnostic Results:  I have reviewed all pertinent imaging results/findings within the past 24 hours.

## 2021-12-23 NOTE — HPI
81 y.o male known to Hematology for h/o anemia of CKD and iron deficiency. He is managed with IV iron and Retacrit PRN. Patient had upper and lower endoscopies 9/2020 unrevealing to bleeding source. He has upcoming outpatient VCE scheduled. Patient reported to [facility] ER for evaluation of AMS. Associated symptoms include dizziness, SOB, weakness. He denies noting any abnormal bleeding. In ER H/H 5.8/20.2. saturated iron 3&. Ferritin 15. UA neg. Occult stool neg. Patient s/p 2 units PRBCs with 3rd unit PRBCs currently infusing.

## 2021-12-23 NOTE — PROGRESS NOTES
[facility] Medicine  Progress Note    Patient Name: Jake Ortiz  MRN: [id number]  Patient Class: OP- Observation   Admission Date: 12/22/2021  Length of Stay: 0 days  Attending Physician: Albaro Castellanos MD  Primary Care Provider: Byron Stevens MD        Subjective:     Principal Problem:Symptomatic anemia        HPI:  80 y/o male with PMHx of HTN, HLD, DM, CHF, CAD, a fib, and tobacco use who presented to the Ed with c/o weakness that onset gradually several days ago. Sx are constant and moderate in severity. No exacerbating or mitigating factors reported. Associated sx include KING, SOB, and red stool. Pt denies dizziness, HA, congestion, cough, palpitations, CP, abd pain, dysuria, ankle edema, and all other sx at this time. Pt is seen by Dr. Nath for Pernicious anemia, and has had extensive workup by GI for anemia with no source of bleeding identified. Pt follows Dr. Aquino for ckd.  ED workup shows: Occult stool negative, H/H 5.8/20.2, WBC 3.75K, RBC 2.35, Ferritin 15, BUN 28, Cr. 2.1, eGFR 29, glucose 122, troponin 0.064  CXR shows There is no focal pulmonary infiltrate visualized.  2. There is blunting of the right costophrenic angle.  This is characteristic of a tiny pleural effusion.  He is a full code and his SDM is his wife, Xuan  He will be kept on OBS for symptomatic anemia under the care of \[facility]\"" medicine.      Overview/Hospital Course:  Patient was kept on OBS for symptomatic anemia under the care of \[facility]\"" medicine.      Interval History: Hgb increased to 7.4 overnight with transfusion of 2u pRBC. Pt is still very tired. Troponin increased to 0.118, pt mukesh CP, palpitations, but admits to SOB with exertion. cardiology consulted. GI feels like no indication for intervention, will continue OP f/u for VCE that is scheduled for next week, they have signed off. Heme/onc following - will start IV iron and continue OP f/u as scheduled.    Review of Systems    Constitutional: Negative for activity change, appetite change, chills, fatigue and fever.   HENT: Negative for dental problem, ear pain, hearing loss, mouth sores, nosebleeds, sinus pressure, sore throat, tinnitus and trouble swallowing.    Eyes: Negative for pain, discharge and visual disturbance.   Respiratory: Positive for shortness of breath. Negative for cough and wheezing.         KING   Cardiovascular: Negative for chest pain, palpitations and leg swelling.   Gastrointestinal: Positive for blood in stool (resolved). Negative for abdominal distention, abdominal pain, anal bleeding, constipation, diarrhea, nausea and vomiting.   Endocrine: Negative for cold intolerance, heat intolerance, polydipsia, polyphagia and polyuria.   Genitourinary: Negative for decreased urine volume, difficulty urinating, flank pain, frequency, hematuria and urgency.   Musculoskeletal: Negative for arthralgias, back pain, gait problem, myalgias, neck pain and neck stiffness.   Skin: Negative for color change, rash and wound.   Allergic/Immunologic: Negative.    Neurological: Negative for dizziness, tremors, seizures, syncope, speech difficulty, weakness, light-headedness and headaches.   Hematological: Negative for adenopathy. Bruises/bleeds easily.   Psychiatric/Behavioral: Negative for agitation, behavioral problems, confusion and sleep disturbance. The patient is not nervous/anxious.    All other systems reviewed and are negative.    Objective:     Vital Signs (Most Recent):  Temp: 98.9 °F (37.2 °C) (12/23/21 1318)  Pulse: 70 (12/23/21 1318)  Resp: 16 (12/23/21 1318)  BP: 130/80 (12/23/21 1318)  SpO2: 98 % (12/23/21 1318) Vital Signs (24h Range):  Temp:  [97.6 °F (36.4 °C)-98.9 °F (37.2 °C)] 98.9 °F (37.2 °C)  Pulse:  [60-89] 70  Resp:  [16-28] 16  SpO2:  [95 %-100 %] 98 %  BP: (105-151)/(53-80) 130/80     Weight: 99.2 kg (218 lb 11.1 oz)  Body mass index is 32.3 kg/m².    Intake/Output Summary (Last 24 hours) at 12/23/2021  1401  Last data filed at 12/23/2021 1222  Gross per 24 hour   Intake 2200.08 ml   Output 700 ml   Net 1500.08 ml      Physical Exam    Significant Labs:   All pertinent labs within the past 24 hours have been reviewed.  Recent Lab Results       12/23/21  1313   12/23/21  1142   12/23/21  0815   12/23/21  0642   12/23/21  0503        Unit Blood Type Code               Unit Expiration               Unit Blood Type               Albumin               Alkaline Phosphatase               ALT               Anion Gap         10       Aniso               Appearance, UA               AST               Baso # 0.04         0.04       Basophil % 0.7         0.9       Bilirubin (UA)               BILIRUBIN TOTAL               BUN         25       Calcium         8.1       Chloride         110       CO2         22       CODING SYSTEM               Color, UA               Creatinine         2.0       Differential Method Automated         Automated       DISPENSE STATUS               eGFR if          36       eGFR if non          31  Comment: Calculation used to obtain the estimated glomerular filtration  rate (eGFR) is the CKD-EPI equation.          Eos # 0.1         0.1       Eosinophil % 2.0         2.9       Ferritin               Glucose         104       Glucose, UA               Gran # (ANC) 4.3         2.8       Gran % 77.4         62.9       Group & Rh               Hematocrit 29.1         25.2       Hemoglobin 8.8         7.4  Comment: Results confirmed, test repeated       Hypo               Immature Grans (Abs) 0.03  Comment: Mild elevation in immature granulocytes is non specific and   can be seen in a variety of conditions including stress response,   acute inflammation, trauma and pregnancy. Correlation with other   laboratory and clinical findings is essential.           0.01  Comment: Mild elevation in immature granulocytes is non specific and   can be seen in a variety of conditions  including stress response,   acute inflammation, trauma and pregnancy. Correlation with other   laboratory and clinical findings is essential.         Immature Granulocytes 0.5         0.2       INDIRECT ULISES               Iron               Ketones, UA               Leukocytes, UA               Lymph # 0.5         0.9       Lymph % 8.0         19.1       MCH 25.6         25.3       MCHC 30.2         29.4       MCV 85         86       Mono # 0.6         0.6       Mono % 11.4         14.0       MPV 9.8         10.3       NITRITE UA               nRBC 1         1       Occult Blood               Occult Blood UA               Ovalocytes               pH, UA               Platelet Estimate               Platelets 200         199       POCT Glucose   120     120         Poik               Potassium         3.7       Product Code               PROTEIN TOTAL               Protein, UA               RBC 3.44         2.93       RDW 17.8         17.6       Saturated Iron               Sodium         142       Specific [place], UA               Specimen UA               Target Cells               Tear Drop Cells               TIBC               Transferrin               Troponin I     0.119  Comment: The reference interval for Troponin I represents the 99th percentile   cutoff   for our facility and is consistent with 3rd generation assay   performance.             UNIT NUMBER               UROBILINOGEN UA               WBC 5.61         4.44                        12/22/21  1813   12/22/21  1600   12/22/21  1502   12/22/21  1445        Unit Blood Type Code       5100  [P]              5100              5100       Unit Expiration       [id number]  [P]              [id number]       Unit Blood Type       O POS  [P]              O POS              O POS       Albumin       3.9       Alkaline Phosphatase       78       ALT       18       Anion Gap       13       Aniso       Slight       Appearance, UA    Clear           AST       26       Baso #       0.04       Basophil %       1.1       Bilirubin (UA)   Negative           BILIRUBIN TOTAL       0.5  Comment: For infants and newborns, interpretation of results should be based  on gestational age, weight and in agreement with clinical  observations.    Premature Infant recommended reference ranges:  Up to 24 hours.............<8.0 mg/dL  Up to 48 hours............<12.0 mg/dL  3-5 days..................<15.0 mg/dL  6-29 days.................<15.0 mg/dL         BUN       28       Calcium       8.4       Chloride       106       CO2       23       CODING SYSTEM       [id number]  [P]              [id number]              [id number]       Color, UA   Yellow           Creatinine       2.1       Differential Method       Automated       DISPENSE STATUS       ISSUED  [P]              TRANSFUSED              TRANSFUSED       eGFR if        34       eGFR if non        29  Comment: Calculation used to obtain the estimated glomerular filtration  rate (eGFR) is the CKD-EPI equation.          Eos #       0.1       Eosinophil %       1.3       Ferritin       15       Glucose       122       Glucose, UA   Negative           Gran # (ANC)       2.6       Gran %       69.8       Group & Rh       O POS       Hematocrit       20.2       Hemoglobin       5.8  Comment: HGB  critical result(s) called and verbal readback obtained from   Mary Jane poole RN by TD3 12/22/2021 15:06         Hypo       Occasional       Immature Grans (Abs)       0.01  Comment: Mild elevation in immature granulocytes is non specific and   can be seen in a variety of conditions including stress response,   acute inflammation, trauma and pregnancy. Correlation with other   laboratory and clinical findings is essential.         Immature Granulocytes       0.3       INDIRECT ULISES       NEG       Iron       12       Ketones, UA   Negative           Leukocytes, UA   Negative           Lymph #        0.5       Lymph %       12.8       MCH       24.7       MCHC       28.7       MCV       86       Mono #       0.6       Mono %       14.7       MPV       9.3       NITRITE UA   Negative           nRBC       1       Occult Blood     Negative         Occult Blood UA   Negative           Ovalocytes       Occasional       pH, UA   6.0           Platelet Estimate       Appears normal       Platelets       185       POCT Glucose             Poik       Slight       Potassium       3.6       Product Code       [id number]  [P]              [id number]              [id number]       PROTEIN TOTAL       6.8       Protein, UA   Trace  Comment: Recommend a 24 hour urine protein or a urine   protein/creatinine ratio if globulin induced proteinuria is  clinically suspected.             RBC       2.35       RDW       18.7       Saturated Iron       3       Sodium       142       Specific Gravity, UA   1.020           Specimen UA   Urine, Clean Catch           Target Cells       Occasional       Tear Drop Cells       Occasional       TIBC       454       Transferrin       307       Troponin I 0.087  Comment: The reference interval for Troponin I represents the 99th percentile   cutoff   for our facility and is consistent with 3rd generation assay   performance.         0.064  Comment: The reference interval for Troponin I represents the 99th percentile   cutoff   for our facility and is consistent with 3rd generation assay   performance.         UNIT NUMBER       [id number]  [P]              [id number]              [id number]       UROBILINOGEN UA   Negative           WBC       3.75              [P] - Preliminary Result             Significant Imaging: I have reviewed all pertinent imaging results/findings within the past 24 hours.      Assessment/Plan:      * Symptomatic anemia  --SOB and KING  --hgb 5.8 on admit  --transfuse 2u pRBC  --repeat cbc in AM  --has had extensive workup with GI and heme/onc  --occult stool  negative  --pt reports red stool - but not blood streaked, entire BM was red, he reports it could have been from his diet/strawberry ice cream  --hold eliquis  --GI & Heme/onc consulted  12/23:  --Hgb increased to 7.4 today, transfuse another u pRBC      Elevated troponin  --troponin 0.064>>0.087>>0.119  --denies CP, has KING  --holding eliquis 2/2 gi bleed  --cardiology consulted        VAIBHAV (acute kidney injury)  --baseline BUN 17, Cr. 1.8  --given IVF's in ED  --repeat BMP shows BUN 25, Cr. 2.0  --will give gentle IVF's      Atrial fibrillation  --permanent AF for at least a few years by ECGs and PPm in VVIR - Bi V paced   --sees Dr. Carrero as OP  --hold eliquis       Mixed restrictive and obstructive lung disease  --stable at present  --PRN oxygen  --PRN albuterol via nebulizer      S/P CABG x 3  --continue statin  --cardiac diet      CAD (coronary artery disease)  --continue statin  --hold eliquis, plavix  --tele monitoring        Hyperlipemia  --continue statin  --cardiac diet      Hypertension  --stable  --will hold losartan for VAIBHAV  --PRN hydralazine      VTE Risk Mitigation (From admission, onward)         Ordered     Reason for No Pharmacological VTE Prophylaxis  Once        Question:  Reasons:  Answer:  Physician Provided (leave comment)    12/22/21 1834     IP VTE HIGH RISK PATIENT  Once         12/22/21 1834     Place sequential compression device  Until discontinued         12/22/21 1834                Discharge Planning   LITTLE:      Code Status: Full Code   Is the patient medically ready for discharge?:     Reason for patient still in hospital (select all that apply): Patient trending condition, Treatment and Consult recommendations                     DANIEL Pantoja  Department of Hospital Medicine   [facility] - Telemetry ([facility])

## 2021-12-23 NOTE — PROGRESS NOTES
Pt admitted to room 247 from ED with 1 Unit of blood transfusing. Rate changed with REBECA Fernandez.  Safety precautions in place and maintained. Blood pressure 135/64, pulse 77, temperature 97.7 °F (36.5 °C), temperature source Oral, resp. rate 18, weight 98.1 kg (216 lb 4.3 oz), SpO2 99 %.

## 2021-12-23 NOTE — HPI
Consult for elevated troponin and CAD s/p CABG  Jake Ortiz is a 81 y.o. male pt with CAD s/p CABG, old MI, HFPEF with TR/MR, AVB s/p CRT, PAF on Eliquis,  Anemia, HTN, HLD, DM2, NASREEN, CKD4, GERD, Restrictive/obstructive lung disease.  Came in with severe anemia. Improved weakness and SOB after 2 units PRBC,  Denied chest pain   Ekg V pacing  Anemia HGB 5.8, up to 7.7 after 2 units PRBC  troponin 0.064>>0.087>>0.119  baseline BUN 17, Cr. 1.8, given IVF's in ED, repeat BMP shows BUN 25, Cr. 2.0  Eliquis held 3 days ago  Echo 06/2020 EF 50% mild to mod MR  GI will do capsule endo.

## 2021-12-23 NOTE — H&P
[facility] Medicine  History & Physical    Patient Name: Jake Ortiz  MRN: [id number]  Patient Class: OP- Observation  Admission Date: 12/22/2021  Attending Physician: Albaro Castellanos MD   Primary Care Provider: Byron Stevens MD         Patient information was obtained from patient, spouse/SO, past medical records and ER records.     Subjective:     Principal Problem:Symptomatic anemia    Chief Complaint:   Chief Complaint   Patient presents with    Weakness     Weakness, SOB, told he needs a transfusion from cancer center         HPI: 80 y/o male with PMHx of HTN, HLD, DM, CHF, CAD, a fib, and tobacco use who presented to the Ed with c/o weakness that onset gradually several days ago. Sx are constant and moderate in severity. No exacerbating or mitigating factors reported. Associated sx include KING, SOB, and red stool. Pt denies dizziness, HA, congestion, cough, palpitations, CP, abd pain, dysuria, ankle edema, and all other sx at this time. Pt is seen by Dr. Nath for Pernicious anemia, and has had extensive workup by GI for anemia with no source of bleeding identified. Pt follows Dr. Aquino for ckd.  ED workup shows: Occult stool negative, H/H 5.8/20.2, WBC 3.75K, RBC 2.35, Ferritin 15, BUN 28, Cr. 2.1, eGFR 29, glucose 122, troponin 0.064  CXR shows There is no focal pulmonary infiltrate visualized.  2. There is blunting of the right costophrenic angle.  This is characteristic of a tiny pleural effusion.  He is a full code and his SDM is his wife, Xuan  He will be kept on OBS for symptomatic anemia under the care of [facility].      Past Medical History:   Diagnosis Date    Abnormal PFT     Anemia     Arthritis     Atrial fibrillation 10/19/2017    Back pain     Congestive heart failure 3/5/2018    Coronary artery disease     DM (diabetes mellitus) 2018     am 06/23/2020    DM (diabetes mellitus) 2016     am 08/17/2021    Hyperlipemia     Hypertension      Myocardial infarction     Obesity     NASREEN (obstructive sleep apnea) 6/5/2018    Prostate cancer 2015    Tobacco dependence     Type 2 diabetes mellitus        Past Surgical History:   Procedure Laterality Date    COLONOSCOPY N/A 9/22/2020    Procedure: COLONOSCOPY;  Surgeon: Javier Farmer MD;  Location: [facility] ENDO;  Service: Endoscopy;  Laterality: N/A;    CORONARY ARTERY BYPASS GRAFT  1987    EPIDURAL STEROID INJECTION N/A 11/22/2019    Procedure: Lumbar L5/S1 IL XUAN;  Surgeon: Tacho Trivedi MD;  Location: [facility]IN [facility]GT;  Service: Pain Management;  Laterality: N/A;    EPIDURAL STEROID INJECTION N/A 1/6/2020    Procedure: Lumbar L5/S1 IL XUAN;  Surgeon: Tacho Trivedi MD;  Location: [facility];  Service: Pain Management;  Laterality: N/A;    EPIDURAL STEROID INJECTION N/A 2/10/2020    Procedure: Caudal XUAN;  Surgeon: Tacho Trivedi MD;  Location: [facility];  Service: Pain Management;  Laterality: N/A;    ESOPHAGOGASTRODUODENOSCOPY N/A 9/22/2020    Procedure: EGD (ESOPHAGOGASTRODUODENOSCOPY);  Surgeon: Javier Farmer MD;  Location: [facility] ENDO;  Service: Endoscopy;  Laterality: N/A;    INJECTION OF ANESTHETIC AGENT AROUND MEDIAL BRANCH NERVES INNERVATING LUMBAR FACET JOINT Bilateral 10/12/2020    Procedure: Bilateral L3-5 MBB;  Surgeon: Tacho Trivedi MD;  Location: [facility];  Service: Pain Management;  Laterality: Bilateral;    INJECTION OF ANESTHETIC AGENT AROUND MEDIAL BRANCH NERVES INNERVATING LUMBAR FACET JOINT Bilateral 12/21/2020    Procedure: Bilateral L3-5 MBB with steroid;  Surgeon: Tacho Trivedi MD;  Location: [facility];  Service: Pain Management;  Laterality: Bilateral;    PROSTATE SURGERY      prostate radiation    RADIOFREQUENCY THERMOCOAGULATION Left 6/4/2021    Procedure: Left L3-5 Lumbar RFA;  Surgeon: Tacho Trivedi MD;  Location: [facility];  Service: Pain Management;  Laterality: Left;    RADIOFREQUENCY  THERMOCOAGULATION Right 6/18/2021    Procedure: Right L3-5 Lumbar RFA;  Surgeon: Tacho Trivedi MD;  [facility];  Service: Pain Management;  Laterality: Right;    SPINE SURGERY      fusion    TONSILLECTOMY         Review of patient's allergies indicates:  No Known Allergies    Current Facility-Administered Medications on File Prior to Encounter   Medication    ondansetron injection 4 mg     Current Outpatient Medications on File Prior to Encounter   Medication Sig    acetaminophen (TYLENOL) 500 MG tablet Take 500 mg by mouth every 6 (six) hours as needed for Pain.    albuterol (ACCUNEB) 1.25 mg/3 mL Nebu Take 3 mLs (1.25 mg total) by nebulization every 6 (six) hours as needed (cough and SOB). Rescue    apixaban (ELIQUIS) 5 mg Tab Take 1 tablet (5 mg total) by mouth 2 (two) times daily.    atorvastatin (LIPITOR) 80 MG tablet One tablet daily    blood sugar diagnostic Strp Check blood glucose levels daily in the AM fasting and 1-2 times more daily.    blood-glucose meter kit Use as instructed    cholecalciferol, vitamin D3, (VITAMIN D3) 25 mcg (1,000 unit) capsule Take 5,000 Units by mouth once daily.    clonazePAM (KLONOPIN) 1 MG tablet Take 1 tablet (1 mg total) by mouth nightly as needed for Anxiety.    clopidogreL (PLAVIX) 75 mg tablet Take 1 tablet (75 mg total) by mouth once daily.    ferrous sulfate (FEOSOL) 325 mg (65 mg iron) Tab tablet Take 325 mg by mouth daily with breakfast.    fluticasone propionate (FLONASE) 50 mcg/actuation nasal spray 2 sprays (100 mcg total) by Each Nostril route once daily.    furosemide (LASIX) 20 MG tablet Take 1 tablet (20 mg total) by mouth 2 (two) times daily. Can double to 2 tablets twice a day for 3 days for more swelling/fluid build up    lancets Misc Check blood glucose levels daily in the AM fasting and 1-2 times more daily.    levocetirizine (XYZAL) 5 MG tablet Take 1 tablet (5 mg total) by mouth every evening.    losartan (COZAAR) 50 MG  tablet Take 1 tablet (50 mg total) by mouth once daily.    multivitamin capsule Take 1 capsule by mouth once daily.    pantoprazole (PROTONIX) 40 MG tablet Take 1 tablet (40 mg total) by mouth once daily.    pregabalin (LYRICA) 75 MG capsule Take 1 capsule, 75 mg, once daily for 1 week.  Followed by take 1 capsule 75 mg twice daily for 1 week.  Followed by 2 capsules, 150 mg in the morning and 75 mg at night for 1 week.  Followed by 150 mg twice daily for 1 week.  Followed by 225 mg in the morning and 150 mg in the evening for 1 week.  Followed by 225 mg b.i.d..    spironolactone (ALDACTONE) 25 MG tablet Take 1 tablet (25 mg total) by mouth once daily.     Family History     Problem Relation (Age of Onset)    Cataracts Mother    Diabetes Mother    Heart attack Mother    Heart disease Mother, Father, Brother    Stroke Father        Tobacco Use    Smoking status: Former Smoker     Packs/day: 1.50     Years: 20.00     Pack years: 30.00     Types: Cigarettes     Start date:      Quit date:      Years since quittin.9    Smokeless tobacco: Never Used   Substance and Sexual Activity    Alcohol use: No    Drug use: No    Sexual activity: Not Currently     Review of Systems   Constitutional: Negative for activity change, appetite change, chills, fatigue and fever.   HENT: Negative for dental problem, ear pain, hearing loss, mouth sores, nosebleeds, sinus pressure, sore throat, tinnitus and trouble swallowing.    Eyes: Negative for pain, discharge and visual disturbance.   Respiratory: Positive for shortness of breath. Negative for cough and wheezing.         KING   Cardiovascular: Negative for chest pain, palpitations and leg swelling.   Gastrointestinal: Positive for blood in stool (red still, but not blood streaks). Negative for abdominal distention, abdominal pain, anal bleeding, constipation, diarrhea, nausea and vomiting.   Endocrine: Negative for cold intolerance, heat intolerance, polydipsia,  polyphagia and polyuria.   Genitourinary: Negative for decreased urine volume, difficulty urinating, flank pain, frequency, hematuria and urgency.   Musculoskeletal: Negative for arthralgias, back pain, gait problem, myalgias, neck pain and neck stiffness.   Skin: Negative for color change, rash and wound.   Allergic/Immunologic: Negative.    Neurological: Positive for weakness. Negative for dizziness, tremors, seizures, syncope, speech difficulty, light-headedness and headaches.   Hematological: Negative for adenopathy. Bruises/bleeds easily.   Psychiatric/Behavioral: Negative for agitation, behavioral problems, confusion and sleep disturbance. The patient is not nervous/anxious.    All other systems reviewed and are negative.    Objective:     Vital Signs (Most Recent):  Temp: 98.1 °F (36.7 °C) (12/22/21 1724)  Pulse: 63 (12/22/21 1724)  Resp: (!) 25 (12/22/21 1724)  BP: (!) 129/57 (12/22/21 1724)  SpO2: 100 % (12/22/21 1724) Vital Signs (24h Range):  Temp:  [97.9 °F (36.6 °C)-98.1 °F (36.7 °C)] 98.1 °F (36.7 °C)  Pulse:  [60-79] 63  Resp:  [18-28] 25  SpO2:  [97 %-100 %] 100 %  BP: (105-129)/(53-60) 129/57     Weight: 102.1 kg (225 lb)  Body mass index is 33.23 kg/m².    Physical Exam  Vitals and nursing note reviewed.   Constitutional:       General: He is not in acute distress.     Appearance: He is well-developed and well-nourished. He is not diaphoretic.   HENT:      Head: Normocephalic and atraumatic.      Nose: Nose normal.      Mouth/Throat:      Mouth: Oropharynx is clear and moist.   Eyes:      General:         Right eye: No discharge.         Left eye: No discharge.      Extraocular Movements: EOM normal.      Conjunctiva/sclera: Conjunctivae normal.      Pupils: Pupils are equal, round, and reactive to light.   Neck:      Thyroid: No thyromegaly.      Vascular: No JVD.      Trachea: No tracheal deviation.   Cardiovascular:      Rate and Rhythm: Normal rate and regular rhythm.      Pulses: Intact distal  pulses.      Heart sounds: Normal heart sounds. No murmur heard.  No friction rub. No gallop.    Pulmonary:      Effort: Pulmonary effort is normal. No respiratory distress.      Breath sounds: Normal breath sounds. No wheezing or rales.   Chest:      Chest wall: No tenderness.   Abdominal:      General: Bowel sounds are normal. There is no distension.      Palpations: Abdomen is soft. There is no mass.      Tenderness: There is no abdominal tenderness.   Genitourinary:     Comments: deferred  Musculoskeletal:         General: No tenderness, deformity or edema. Normal range of motion.      Cervical back: Normal range of motion and neck supple.   Skin:     General: Skin is warm and dry.      Capillary Refill: Capillary refill takes less than 2 seconds.      Findings: No erythema or rash.   Neurological:      Mental Status: He is alert and oriented to person, place, and time.      Motor: No abnormal muscle tone.      Coordination: Coordination normal.   Psychiatric:         Mood and Affect: Mood and affect normal.         Behavior: Behavior normal.           CRANIAL NERVES     CN III, IV, VI   Pupils are equal, round, and reactive to light.  Extraocular motions are normal.        Significant Labs:   All pertinent labs within the past 24 hours have been reviewed.  Recent Lab Results       12/22/21  1600   12/22/21  1502   12/22/21  1445        Unit Blood Type Code     5100  [P]            5100  [P]       Unit Expiration     [id number]  [P]            [id number]  [P]       Unit Blood Type     O POS  [P]            O POS  [P]       Albumin     3.9       Alkaline Phosphatase     78       ALT     18       Anion Gap     13       Aniso     Slight       Appearance, UA Clear           AST     26       Baso #     0.04       Basophil %     1.1       Bilirubin (UA) Negative           BILIRUBIN TOTAL     0.5  Comment: For infants and newborns, interpretation of results should be based  on gestational age, weight and in  agreement with clinical  observations.    Premature Infant recommended reference ranges:  Up to 24 hours.............<8.0 mg/dL  Up to 48 hours............<12.0 mg/dL  3-5 days..................<15.0 mg/dL  6-29 days.................<15.0 mg/dL         BUN     28       Calcium     8.4       Chloride     106       CO2     23       CODING SYSTEM     PHTF116  [P]            [id number]  [P]       Color, UA Yellow           Creatinine     2.1       Differential Method     Automated       DISPENSE STATUS     CROSSMATCHED  [P]            ISSUED  [P]       eGFR if      34       eGFR if non      29  Comment: Calculation used to obtain the estimated glomerular filtration  rate (eGFR) is the CKD-EPI equation.          Eos #     0.1       Eosinophil %     1.3       Ferritin     15       Glucose     122       Glucose, UA Negative           Gran # (ANC)     2.6       Gran %     69.8       Group & Rh     O POS       Hematocrit     20.2       Hemoglobin     5.8  Comment: HGB  critical result(s) called and verbal readback obtained from   Mary Jane poole RN by TD3 12/22/2021 15:06         Hypo     Occasional       Immature Grans (Abs)     0.01  Comment: Mild elevation in immature granulocytes is non specific and   can be seen in a variety of conditions including stress response,   acute inflammation, trauma and pregnancy. Correlation with other   laboratory and clinical findings is essential.         Immature Granulocytes     0.3       INDIRECT ULISES     NEG       Ketones, UA Negative           Leukocytes, UA Negative           Lymph #     0.5       Lymph %     12.8       MCH     24.7       MCHC     28.7       MCV     86       Mono #     0.6       Mono %     14.7       MPV     9.3       NITRITE UA Negative           nRBC     1       Occult Blood   Negative         Occult Blood UA Negative           Ovalocytes     Occasional       pH, UA 6.0           Platelet Estimate     Appears normal       Platelets      185       Poik     Slight       Potassium     3.6       Product Code     D8646N15  [P]            [id number]  [P]       PROTEIN TOTAL     6.8       Protein, UA Trace  Comment: Recommend a 24 hour urine protein or a urine   protein/creatinine ratio if globulin induced proteinuria is  clinically suspected.             RBC     2.35       RDW     18.7       Sodium     142       Specific Gravity, UA 1.020           Specimen UA Urine, Clean Catch           Target Cells     Occasional       Tear Drop Cells     Occasional       Troponin I     0.064  Comment: The reference interval for Troponin I represents the 99th percentile   cutoff   for our facility and is consistent with 3rd generation assay   performance.         UNIT NUMBER     [id number]  [P]            [id number]  [P]       UROBILINOGEN UA Negative           WBC     3.75              [P] - Preliminary Result             Significant Imaging: I have reviewed all pertinent imaging results/findings within the past 24 hours.    Assessment/Plan:     * Symptomatic anemia  --SOB and KING  --hgb 5.8 on admit  --transfuse 2u pRBC  --repeat cbc in AM  --has had extensive workup with GI and heme/onc  --occult stool negative  --pt reports red stool - but not blood streaked, entire BM was red, he reports it could have been from his diet/strawberry ice cream  --hold eliquis  --GI & Heme/onc consulted      VAIBHAV (acute kidney injury)  --baseline BUN 17, Cr. 1.8  --given IVF's in ED  --repeat BMP in AM      Atrial fibrillation  --permanent AF for at least a few years by ECGs and PPm in VVIR - Bi V paced   --sees Dr. Carrero as OP  --hold eliquis       Mixed restrictive and obstructive lung disease  --stable at present  --PRN oxygen  --PRN albuterol via nebulizer      S/P CABG x 3  --continue statin  --cardiac diet      CAD (coronary artery disease)  --continue statin  --hold eliquis, plavix  --tele monitoring        Hyperlipemia  --continue statin  --cardiac  diet      Hypertension  --stable  --will hold losartan for VAIBHAV  --PRN hydralazine      VTE Risk Mitigation (From admission, onward)         Ordered     Reason for No Pharmacological VTE Prophylaxis  Once        Question:  Reasons:  Answer:  Physician Provided (leave comment)    12/22/21 1834     IP VTE HIGH RISK PATIENT  Once         12/22/21 1834     Place sequential compression device  Until discontinued         12/22/21 1834                   DOMIGNO Pantoja-C  Department of Hospital Medicine   '[place] - Telemetry ([facility])

## 2021-12-23 NOTE — CONSULTS
'[facility])  Hematology/Oncology  Consult Note    Patient Name: Jake Ortiz  MRN: [id number]  Admission Date: 12/22/2021  Hospital Length of Stay: 0 days  Code Status: Full Code   Attending Provider: Albaro Castellanos MD  Consulting Provider: Ginny Mckay NP  Primary Care Physician: Byron Stevens MD  Principal Problem:Symptomatic anemia    Inpatient consult to Hematology/Oncology  Consult performed by: Ginny Mckay NP  Consult ordered by: DANIEL Melgoza        Subjective:     HPI:  81 y.o male known to Hematology for h/o anemia of CKD and iron deficiency. He is managed with IV iron and Retacrit PRN. Patient had upper and lower endoscopies 9/2020 unrevealing to bleeding source. He has upcoming outpatient VCE scheduled. Patient reported to [facility] ER for evaluation of AMS. Associated symptoms include dizziness, SOB, weakness. He denies noting any abnormal bleeding. In ER H/H 5.8/20.2. saturated iron 3&. Ferritin 15. UA neg. Occult stool neg. Patient s/p 2 units PRBCs with 3rd unit PRBCs currently infusing.       Oncology Treatment Plan:   [No treatment plan]    Medications:  Continuous Infusions:  Scheduled Meds:   atorvastatin  80 mg Oral Daily    cholecalciferol (vitamin D3)  5,000 Units Oral Daily    ferrous sulfate  1 tablet Oral Daily    furosemide  20 mg Oral BID    iron sucrose (VENOFER) IVPB  200 mg Intravenous Once    multivitamin  1 tablet Oral Daily    pantoprazole  40 mg Oral Daily    pregabalin  150 mg Oral BID    sodium chloride 0.9%  10 mL Intravenous Q8H    spironolactone  25 mg Oral Daily     PRN Meds:sodium chloride, sodium chloride, acetaminophen, albuterol, clonazePAM, dextrose 50%, dextrose 50%, glucagon (human recombinant), glucose, glucose, hydrALAZINE, insulin aspart U-100, melatonin, naloxone, ondansetron, polyethylene glycol, promethazine, simethicone     Review of patient's allergies indicates:  No Known Allergies     Past Medical History:    Diagnosis Date    Abnormal PFT     Anemia     Arthritis     Atrial fibrillation 10/19/2017    Back pain     Congestive heart failure 3/5/2018    Coronary artery disease     DM (diabetes mellitus) 2018     am 06/23/2020    DM (diabetes mellitus) 2016     am 08/17/2021    Hyperlipemia     Hypertension     Myocardial infarction     Obesity     NASREEN (obstructive sleep apnea) 6/5/2018    Prostate cancer 2015    Tobacco dependence     Type 2 diabetes mellitus      Past Surgical History:   Procedure Laterality Date    COLONOSCOPY N/A 9/22/2020    Procedure: COLONOSCOPY;  Surgeon: Javier Farmer MD;  Location: [facility];  Service: Endoscopy;  Laterality: N/A;    CORONARY ARTERY BYPASS GRAFT  1987    EPIDURAL STEROID INJECTION N/A 11/22/2019    Procedure: Lumbar L5/S1 IL XUAN;  Surgeon: Tacho Trivedi MD;  Location: [facility];  Service: Pain Management;  Laterality: N/A;    EPIDURAL STEROID INJECTION N/A 1/6/2020    Procedure: Lumbar L5/S1 IL XUAN;  Surgeon: Tacho Trivedi MD;  Location: [facility];  Service: Pain Management;  Laterality: N/A;    EPIDURAL STEROID INJECTION N/A 2/10/2020    Procedure: Caudal XUAN;  Surgeon: Tacho Trivedi MD;  Location: [facility];  Service: Pain Management;  Laterality: N/A;    ESOPHAGOGASTRODUODENOSCOPY N/A 9/22/2020    Procedure: EGD (ESOPHAGOGASTRODUODENOSCOPY);  Surgeon: Javier Farmer MD;  Location: [facility];  Service: Endoscopy;  Laterality: N/A;    INJECTION OF ANESTHETIC AGENT AROUND MEDIAL BRANCH NERVES INNERVATING LUMBAR FACET JOINT Bilateral 10/12/2020    Procedure: Bilateral L3-5 MBB;  Surgeon: Tacho Trivedi MD;  Location: [facility];  Service: Pain Management;  Laterality: Bilateral;    INJECTION OF ANESTHETIC AGENT AROUND MEDIAL BRANCH NERVES INNERVATING LUMBAR FACET JOINT Bilateral 12/21/2020    Procedure: Bilateral L3-5 MBB with steroid;  Surgeon: Tacho Trivedi MD;  Location: [facility];  Service: Pain Management;  Laterality: Bilateral;    PROSTATE SURGERY      prostate radiation    RADIOFREQUENCY THERMOCOAGULATION Left 2021    Procedure: Left L3-5 Lumbar RFA;  Surgeon: Tacho Trivedi MD;  Location: [facility];  Service: Pain Management;  Laterality: Left;    RADIOFREQUENCY THERMOCOAGULATION Right 2021    Procedure: Right L3-5 Lumbar RFA;  Surgeon: Tacho Trivedi MD;  Location: [facility];  Service: Pain Management;  Laterality: Right;    SPINE SURGERY      fusion    TONSILLECTOMY       Family History     Problem Relation (Age of Onset)    Cataracts Mother    Diabetes Mother    Heart attack Mother    Heart disease Mother, Father, Brother    Stroke Father        Tobacco Use    Smoking status: Former Smoker     Packs/day: 1.50     Years: 20.00     Pack years: 30.00     Types: Cigarettes     Start date:      Quit date:      Years since quittin.0    Smokeless tobacco: Never Used   Substance and Sexual Activity    Alcohol use: No    Drug use: No    Sexual activity: Not Currently       Review of Systems   Constitutional: Positive for activity change and fatigue. Negative for appetite change, chills, fever and unexpected weight change.   HENT: Negative for congestion, mouth sores, nosebleeds, sore throat, trouble swallowing and voice change.    Eyes: Negative for photophobia and visual disturbance.   Respiratory: Positive for shortness of breath (with exertion). Negative for cough, chest tightness and wheezing.    Cardiovascular: Negative for chest pain and leg swelling.   Gastrointestinal: Negative for abdominal distention, abdominal pain, anal bleeding, blood in stool, constipation, diarrhea, nausea and vomiting.   Genitourinary: Negative for difficulty urinating, dysuria and hematuria.   Musculoskeletal: Negative for arthralgias, back pain and myalgias.   Skin: Negative for pallor, rash and wound.   Neurological: Positive for weakness. Negative for  dizziness, syncope and headaches.   Hematological: Negative for adenopathy. Does not bruise/bleed easily.   Psychiatric/Behavioral: The patient is not nervous/anxious.      Objective:     Vital Signs (Most Recent):  Temp: 97.8 °F (36.6 °C) (12/23/21 0930)  Pulse: 61 (12/23/21 0930)  Resp: 18 (12/23/21 0930)  BP: 132/62 (12/23/21 0930)  SpO2: 97 % (12/23/21 0930) Vital Signs (24h Range):  Temp:  [97.6 °F (36.4 °C)-98.1 °F (36.7 °C)] 97.8 °F (36.6 °C)  Pulse:  [60-89] 61  Resp:  [16-28] 18  SpO2:  [95 %-100 %] 97 %  BP: (105-151)/(53-72) 132/62     Weight: 99.2 kg (218 lb 11.1 oz)  Body mass index is 32.3 kg/m².  Body surface area is 2.2 meters squared.      Intake/Output Summary (Last 24 hours) at 12/23/2021 1155  Last data filed at 12/23/2021 0800  Gross per 24 hour   Intake 1540.5 ml   Output 700 ml   Net 840.5 ml       Physical Exam  Vitals reviewed.   Constitutional:       Appearance: He is well-developed and well-nourished.   HENT:      Head: Normocephalic.      Right Ear: External ear normal.      Left Ear: External ear normal.      Mouth/Throat:      Mouth: Oropharynx is clear and moist.   Eyes:      General: Lids are normal. No scleral icterus.        Right eye: No discharge.         Left eye: No discharge.      Extraocular Movements: EOM normal.      Conjunctiva/sclera: Conjunctivae normal.   Neck:      Thyroid: No thyroid mass.   Cardiovascular:      Rate and Rhythm: Normal rate and regular rhythm.      Heart sounds: Normal heart sounds. No murmur heard.      Pulmonary:      Effort: Pulmonary effort is normal. No respiratory distress.      Breath sounds: Normal breath sounds. No wheezing or rales.   Abdominal:      General: Bowel sounds are normal. There is no distension.      Palpations: Abdomen is soft.      Tenderness: There is no abdominal tenderness.   Genitourinary:     Comments: deferred  Musculoskeletal:         General: No edema. Normal range of motion.      Cervical back: Normal range of motion.    Lymphadenopathy:      Head:      Right side of head: No submandibular, preauricular or posterior auricular adenopathy.      Left side of head: No submandibular, preauricular or posterior auricular adenopathy.      Cervical:      Right cervical: No superficial cervical adenopathy.     Left cervical: No superficial cervical adenopathy.   Skin:     General: Skin is warm, dry and intact.   Neurological:      Mental Status: He is alert and oriented to person, place, and time.   Psychiatric:         Mood and Affect: Mood and affect normal.         Speech: Speech normal.         Behavior: Behavior normal. Behavior is cooperative.         Thought Content: Thought content normal.         Significant Labs:   BMP:   Recent Labs   Lab 12/22/21  1445 12/23/21  0503   * 104    142   K 3.6 3.7    110   CO2 23 22*   BUN 28* 25*   CREATININE 2.1* 2.0*   CALCIUM 8.4* 8.1*   , CBC:   Recent Labs   Lab 12/22/21  1445 12/23/21  0503   WBC 3.75* 4.44   HGB 5.8* 7.4*   HCT 20.2* 25.2*    199   , LFTs:   Recent Labs   Lab 12/22/21  1445   ALT 18   AST 26   ALKPHOS 78   BILITOT 0.5   PROT 6.8   ALBUMIN 3.9    and All pertinent labs from the last 24 hours have been reviewed.    Diagnostic Results:  I have reviewed all pertinent imaging results/findings within the past 24 hours.    Assessment/Plan:     * Symptomatic anemia  --Agree to continue ferrous sulfate 325 mg PO daily  --add venofer 200 mg IV x 1  --GI following. Plan for outpatient VCE  --Maintain hg >8  --plan to arrange outpatient follow up for additional IV iron PRN    Atrial fibrillation  --Eliquis currently on hold given recent anemia        Thank you for your consult. I will follow-up with patient. Please contact us if you have any additional questions.    Ginny Mckay NP  Hematology/Oncology  [facility] ([facility])

## 2021-12-23 NOTE — SUBJECTIVE & OBJECTIVE
Past Medical History:   Diagnosis Date    Abnormal PFT     Anemia     Arthritis     Atrial fibrillation 10/19/2017    Back pain     Congestive heart failure 3/5/2018    Coronary artery disease     DM (diabetes mellitus) 2018     am 06/23/2020    DM (diabetes mellitus) 2016     am 08/17/2021    Hyperlipemia     Hypertension     Myocardial infarction     Obesity     NASREEN (obstructive sleep apnea) 6/5/2018    Prostate cancer 2015    Tobacco dependence     Type 2 diabetes mellitus        Past Surgical History:   Procedure Laterality Date    COLONOSCOPY N/A 9/22/2020    Procedure: COLONOSCOPY;  Surgeon: Javier Farmer MD;  Location: [facility];  Service: Endoscopy;  Laterality: N/A;    CORONARY ARTERY BYPASS GRAFT  1987    EPIDURAL STEROID INJECTION N/A 11/22/2019    Procedure: Lumbar L5/S1 IL XUAN;  Surgeon: Tacho Trivedi MD;  Location: [facility];  Service: Pain Management;  Laterality: N/A;    EPIDURAL STEROID INJECTION N/A 1/6/2020    Procedure: Lumbar L5/S1 IL XUAN;  Surgeon: Tacho Trivedi MD;  Location: [facility];  Service: Pain Management;  Laterality: N/A;    EPIDURAL STEROID INJECTION N/A 2/10/2020    Procedure: Caudal XUAN;  Surgeon: Tacho Trivedi MD;  Location: [facility];  Service: Pain Management;  Laterality: N/A;    ESOPHAGOGASTRODUODENOSCOPY N/A 9/22/2020    Procedure: EGD (ESOPHAGOGASTRODUODENOSCOPY);  Surgeon: Javier Farmer MD;  Location: [facility];  Service: Endoscopy;  Laterality: N/A;    INJECTION OF ANESTHETIC AGENT AROUND MEDIAL BRANCH NERVES INNERVATING LUMBAR FACET JOINT Bilateral 10/12/2020    Procedure: Bilateral L3-5 MBB;  Surgeon: Tacho Trivedi MD;  Location: [facility];  Service: Pain Management;  Laterality: Bilateral;    INJECTION OF ANESTHETIC AGENT AROUND MEDIAL BRANCH NERVES INNERVATING LUMBAR FACET JOINT Bilateral 12/21/2020    Procedure: Bilateral L3-5 MBB with steroid;  Surgeon: Tacho Trivedi MD;   Location: [facility];  Service: Pain Management;  Laterality: Bilateral;    PROSTATE SURGERY      prostate radiation    RADIOFREQUENCY THERMOCOAGULATION Left 6/4/2021    Procedure: Left L3-5 Lumbar RFA;  Surgeon: Tacho Trivedi MD;  Location: [facility];  Service: Pain Management;  Laterality: Left;    RADIOFREQUENCY THERMOCOAGULATION Right 6/18/2021    Procedure: Right L3-5 Lumbar RFA;  Surgeon: Tacho Trivedi MD;  [facility];  Service: Pain Management;  Laterality: Right;    SPINE SURGERY      fusion    TONSILLECTOMY         Review of patient's allergies indicates:  No Known Allergies    Current Facility-Administered Medications on File Prior to Encounter   Medication    ondansetron injection 4 mg     Current Outpatient Medications on File Prior to Encounter   Medication Sig    acetaminophen (TYLENOL) 500 MG tablet Take 500 mg by mouth every 6 (six) hours as needed for Pain.    albuterol (ACCUNEB) 1.25 mg/3 mL Nebu Take 3 mLs (1.25 mg total) by nebulization every 6 (six) hours as needed (cough and SOB). Rescue    apixaban (ELIQUIS) 5 mg Tab Take 1 tablet (5 mg total) by mouth 2 (two) times daily.    atorvastatin (LIPITOR) 80 MG tablet One tablet daily    blood sugar diagnostic Strp Check blood glucose levels daily in the AM fasting and 1-2 times more daily.    blood-glucose meter kit Use as instructed    cholecalciferol, vitamin D3, (VITAMIN D3) 25 mcg (1,000 unit) capsule Take 5,000 Units by mouth once daily.    clonazePAM (KLONOPIN) 1 MG tablet Take 1 tablet (1 mg total) by mouth nightly as needed for Anxiety.    clopidogreL (PLAVIX) 75 mg tablet Take 1 tablet (75 mg total) by mouth once daily.    ferrous sulfate (FEOSOL) 325 mg (65 mg iron) Tab tablet Take 325 mg by mouth daily with breakfast.    fluticasone propionate (FLONASE) 50 mcg/actuation nasal spray 2 sprays (100 mcg total) by Each Nostril route once daily.    furosemide (LASIX) 20 MG tablet Take 1 tablet (20  mg total) by mouth 2 (two) times daily. Can double to 2 tablets twice a day for 3 days for more swelling/fluid build up    lancets Misc Check blood glucose levels daily in the AM fasting and 1-2 times more daily.    levocetirizine (XYZAL) 5 MG tablet Take 1 tablet (5 mg total) by mouth every evening.    losartan (COZAAR) 50 MG tablet Take 1 tablet (50 mg total) by mouth once daily.    multivitamin capsule Take 1 capsule by mouth once daily.    pantoprazole (PROTONIX) 40 MG tablet Take 1 tablet (40 mg total) by mouth once daily.    pregabalin (LYRICA) 75 MG capsule Take 1 capsule, 75 mg, once daily for 1 week.  Followed by take 1 capsule 75 mg twice daily for 1 week.  Followed by 2 capsules, 150 mg in the morning and 75 mg at night for 1 week.  Followed by 150 mg twice daily for 1 week.  Followed by 225 mg in the morning and 150 mg in the evening for 1 week.  Followed by 225 mg b.i.d..    spironolactone (ALDACTONE) 25 MG tablet Take 1 tablet (25 mg total) by mouth once daily.     Family History     Problem Relation (Age of Onset)    Cataracts Mother    Diabetes Mother    Heart attack Mother    Heart disease Mother, Father, Brother    Stroke Father        Tobacco Use    Smoking status: Former Smoker     Packs/day: 1.50     Years: 20.00     Pack years: 30.00     Types: Cigarettes     Start date:      Quit date:      Years since quittin.9    Smokeless tobacco: Never Used   Substance and Sexual Activity    Alcohol use: No    Drug use: No    Sexual activity: Not Currently     Review of Systems   Constitutional: Negative for activity change, appetite change, chills, fatigue and fever.   HENT: Negative for dental problem, ear pain, hearing loss, mouth sores, nosebleeds, sinus pressure, sore throat, tinnitus and trouble swallowing.    Eyes: Negative for pain, discharge and visual disturbance.   Respiratory: Positive for shortness of breath. Negative for cough and wheezing.         KING    Cardiovascular: Negative for chest pain, palpitations and leg swelling.   Gastrointestinal: Positive for blood in stool (red still, but not blood streaks). Negative for abdominal distention, abdominal pain, anal bleeding, constipation, diarrhea, nausea and vomiting.   Endocrine: Negative for cold intolerance, heat intolerance, polydipsia, polyphagia and polyuria.   Genitourinary: Negative for decreased urine volume, difficulty urinating, flank pain, frequency, hematuria and urgency.   Musculoskeletal: Negative for arthralgias, back pain, gait problem, myalgias, neck pain and neck stiffness.   Skin: Negative for color change, rash and wound.   Allergic/Immunologic: Negative.    Neurological: Positive for weakness. Negative for dizziness, tremors, seizures, syncope, speech difficulty, light-headedness and headaches.   Hematological: Negative for adenopathy. Bruises/bleeds easily.   Psychiatric/Behavioral: Negative for agitation, behavioral problems, confusion and sleep disturbance. The patient is not nervous/anxious.    All other systems reviewed and are negative.    Objective:     Vital Signs (Most Recent):  Temp: 98.1 °F (36.7 °C) (12/22/21 1724)  Pulse: 63 (12/22/21 1724)  Resp: (!) 25 (12/22/21 1724)  BP: (!) 129/57 (12/22/21 1724)  SpO2: 100 % (12/22/21 1724) Vital Signs (24h Range):  Temp:  [97.9 °F (36.6 °C)-98.1 °F (36.7 °C)] 98.1 °F (36.7 °C)  Pulse:  [60-79] 63  Resp:  [18-28] 25  SpO2:  [97 %-100 %] 100 %  BP: (105-129)/(53-60) 129/57     Weight: 102.1 kg (225 lb)  Body mass index is 33.23 kg/m².    Physical Exam  Vitals and nursing note reviewed.   Constitutional:       General: He is not in acute distress.     Appearance: He is well-developed and well-nourished. He is not diaphoretic.   HENT:      Head: Normocephalic and atraumatic.      Nose: Nose normal.      Mouth/Throat:      Mouth: Oropharynx is clear and moist.   Eyes:      General:         Right eye: No discharge.         Left eye: No discharge.       Extraocular Movements: EOM normal.      Conjunctiva/sclera: Conjunctivae normal.      Pupils: Pupils are equal, round, and reactive to light.   Neck:      Thyroid: No thyromegaly.      Vascular: No JVD.      Trachea: No tracheal deviation.   Cardiovascular:      Rate and Rhythm: Normal rate and regular rhythm.      Pulses: Intact distal pulses.      Heart sounds: Normal heart sounds. No murmur heard.  No friction rub. No gallop.    Pulmonary:      Effort: Pulmonary effort is normal. No respiratory distress.      Breath sounds: Normal breath sounds. No wheezing or rales.   Chest:      Chest wall: No tenderness.   Abdominal:      General: Bowel sounds are normal. There is no distension.      Palpations: Abdomen is soft. There is no mass.      Tenderness: There is no abdominal tenderness.   Genitourinary:     Comments: deferred  Musculoskeletal:         General: No tenderness, deformity or edema. Normal range of motion.      Cervical back: Normal range of motion and neck supple.   Skin:     General: Skin is warm and dry.      Capillary Refill: Capillary refill takes less than 2 seconds.      Findings: No erythema or rash.   Neurological:      Mental Status: He is alert and oriented to person, place, and time.      Motor: No abnormal muscle tone.      Coordination: Coordination normal.   Psychiatric:         Mood and Affect: Mood and affect normal.         Behavior: Behavior normal.           CRANIAL NERVES     CN III, IV, VI   Pupils are equal, round, and reactive to light.  Extraocular motions are normal.        Significant Labs:   All pertinent labs within the past 24 hours have been reviewed.  Recent Lab Results       12/22/21  1600   12/22/21  1502   12/22/21  1445        Unit Blood Type Code     5100  [P]            5100  [P]       Unit Expiration     [id number]  [P]            [id number]  [P]       Unit Blood Type     O POS  [P]            O POS  [P]       Albumin     3.9       Alkaline Phosphatase      78       ALT     18       Anion Gap     13       Aniso     Slight       Appearance, UA Clear           AST     26       Baso #     0.04       Basophil %     1.1       Bilirubin (UA) Negative           BILIRUBIN TOTAL     0.5  Comment: For infants and newborns, interpretation of results should be based  on gestational age, weight and in agreement with clinical  observations.    Premature Infant recommended reference ranges:  Up to 24 hours.............<8.0 mg/dL  Up to 48 hours............<12.0 mg/dL  3-5 days..................<15.0 mg/dL  6-29 days.................<15.0 mg/dL         BUN     28       Calcium     8.4       Chloride     106       CO2     23       CODING SYSTEM     FFBL316  [P]            [id number]  [P]       Color, UA Yellow           Creatinine     2.1       Differential Method     Automated       DISPENSE STATUS     CROSSMATCHED  [P]            ISSUED  [P]       eGFR if      34       eGFR if non      29  Comment: Calculation used to obtain the estimated glomerular filtration  rate (eGFR) is the CKD-EPI equation.          Eos #     0.1       Eosinophil %     1.3       Ferritin     15       Glucose     122       Glucose, UA Negative           Gran # (ANC)     2.6       Gran %     69.8       Group & Rh     O POS       Hematocrit     20.2       Hemoglobin     5.8  Comment: HGB  critical result(s) called and verbal readback obtained from   Mary Jane poole RN by TD3 12/22/2021 15:06         Hypo     Occasional       Immature Grans (Abs)     0.01  Comment: Mild elevation in immature granulocytes is non specific and   can be seen in a variety of conditions including stress response,   acute inflammation, trauma and pregnancy. Correlation with other   laboratory and clinical findings is essential.         Immature Granulocytes     0.3       INDIRECT ULISES     NEG       Ketones, UA Negative           Leukocytes, UA Negative           Lymph #     0.5       Lymph %     12.8        MCH     24.7       MCHC     28.7       MCV     86       Mono #     0.6       Mono %     14.7       MPV     9.3       NITRITE UA Negative           nRBC     1       Occult Blood   Negative         Occult Blood UA Negative           Ovalocytes     Occasional       pH, UA 6.0           Platelet Estimate     Appears normal       Platelets     185       Poik     Slight       Potassium     3.6       Product Code     R2779W56  [P]            [id number]  [P]       PROTEIN TOTAL     6.8       Protein, UA Trace  Comment: Recommend a 24 hour urine protein or a urine   protein/creatinine ratio if globulin induced proteinuria is  clinically suspected.             RBC     2.35       RDW     18.7       Sodium     142       Specific Gravity, UA 1.020           Specimen UA Urine, Clean Catch           Target Cells     Occasional       Tear Drop Cells     Occasional       Troponin I     0.064  Comment: The reference interval for Troponin I represents the 99th percentile   cutoff   for our facility and is consistent with 3rd generation assay   performance.         UNIT NUMBER     [id number]  [P]            [id number]  [P]       UROBILINOGEN UA Negative           WBC     3.75              [P] - Preliminary Result             Significant Imaging: I have reviewed all pertinent imaging results/findings within the past 24 hours.

## 2021-12-23 NOTE — ASSESSMENT & PLAN NOTE
--Agree to continue ferrous sulfate 325 mg PO daily  --add venofer 200 mg IV x 1  --GI following. Plan for outpatient VCE  --Maintain hg >8  --plan to arrange outpatient follow up for additional IV iron PRN

## 2021-12-23 NOTE — ASSESSMENT & PLAN NOTE
--baseline BUN 17, Cr. 1.8  --given IVF's in ED  --repeat BMP shows BUN 25, Cr. 2.0  --will give gentle IVF's

## 2021-12-23 NOTE — HPI
The patient has a history of iron deficiency anemia followed by Hematology and GI. The patient presented to the ER with altered mental status, SOB and drop in Hgb. In the ER, his Hgb was 5.8. He received two units of blood with a repeat Hgb of 7.4. He is feeling better today and mental status back to baseline. He reported having a red stool four days ago which was intermittent. He thinks it may have been related to cherries he ate. His FOBT was negative. BUN 28 and creatinine 2.1. Vitals stable. He denies melena, abdominal pain, nausea or vomiting. He had an unremarkable EGD and colonoscopy in 2020 as part of his anemia workup. CTE was also unrevealing (07/2021). He is scheduled for a video capsule next week. He is on Plavix and ASA at home. He gets erythropoietin from Hematology. He has received IV iron in the past but not sure he has had any recently. Per patient, he doesn't take oral iron regularly.

## 2021-12-23 NOTE — SUBJECTIVE & OBJECTIVE
Interval History: Hgb increased to 7.4 overnight with transfusion of 2u pRBC. Pt is still very tired. Troponin increased to 0.118, pt mukesh CP, palpitations, but admits to SOB with exertion. cardiology consulted. GI feels like no indication for intervention, will continue OP f/u for VCE that is scheduled for next week, they have signed off. Heme/onc following - will start IV iron and continue OP f/u as scheduled.    Review of Systems   Constitutional: Negative for activity change, appetite change, chills, fatigue and fever.   HENT: Negative for dental problem, ear pain, hearing loss, mouth sores, nosebleeds, sinus pressure, sore throat, tinnitus and trouble swallowing.    Eyes: Negative for pain, discharge and visual disturbance.   Respiratory: Positive for shortness of breath. Negative for cough and wheezing.         KING   Cardiovascular: Negative for chest pain, palpitations and leg swelling.   Gastrointestinal: Positive for blood in stool (resolved). Negative for abdominal distention, abdominal pain, anal bleeding, constipation, diarrhea, nausea and vomiting.   Endocrine: Negative for cold intolerance, heat intolerance, polydipsia, polyphagia and polyuria.   Genitourinary: Negative for decreased urine volume, difficulty urinating, flank pain, frequency, hematuria and urgency.   Musculoskeletal: Negative for arthralgias, back pain, gait problem, myalgias, neck pain and neck stiffness.   Skin: Negative for color change, rash and wound.   Allergic/Immunologic: Negative.    Neurological: Negative for dizziness, tremors, seizures, syncope, speech difficulty, weakness, light-headedness and headaches.   Hematological: Negative for adenopathy. Bruises/bleeds easily.   Psychiatric/Behavioral: Negative for agitation, behavioral problems, confusion and sleep disturbance. The patient is not nervous/anxious.    All other systems reviewed and are negative.    Objective:     Vital Signs (Most Recent):  Temp: 98.9 °F (37.2 °C)  (12/23/21 1318)  Pulse: 70 (12/23/21 1318)  Resp: 16 (12/23/21 1318)  BP: 130/80 (12/23/21 1318)  SpO2: 98 % (12/23/21 1318) Vital Signs (24h Range):  Temp:  [97.6 °F (36.4 °C)-98.9 °F (37.2 °C)] 98.9 °F (37.2 °C)  Pulse:  [60-89] 70  Resp:  [16-28] 16  SpO2:  [95 %-100 %] 98 %  BP: (105-151)/(53-80) 130/80     Weight: 99.2 kg (218 lb 11.1 oz)  Body mass index is 32.3 kg/m².    Intake/Output Summary (Last 24 hours) at 12/23/2021 1401  Last data filed at 12/23/2021 1222  Gross per 24 hour   Intake 2200.08 ml   Output 700 ml   Net 1500.08 ml      Physical Exam    Significant Labs:   All pertinent labs within the past 24 hours have been reviewed.  Recent Lab Results       12/23/21  1313   12/23/21  1142   12/23/21  0815   12/23/21  0642   12/23/21  0503        Unit Blood Type Code               Unit Expiration               Unit Blood Type               Albumin               Alkaline Phosphatase               ALT               Anion Gap         10       Aniso               Appearance, UA               AST               Baso # 0.04         0.04       Basophil % 0.7         0.9       Bilirubin (UA)               BILIRUBIN TOTAL               BUN         25       Calcium         8.1       Chloride         110       CO2         22       CODING SYSTEM               Color, UA               Creatinine         2.0       Differential Method Automated         Automated       DISPENSE STATUS               eGFR if          36       eGFR if non          31  Comment: Calculation used to obtain the estimated glomerular filtration  rate (eGFR) is the CKD-EPI equation.          Eos # 0.1         0.1       Eosinophil % 2.0         2.9       Ferritin               Glucose         104       Glucose, UA               Gran # (ANC) 4.3         2.8       Gran % 77.4         62.9       Group & Rh               Hematocrit 29.1         25.2       Hemoglobin 8.8         7.4  Comment: Results confirmed, test  repeated       Hypo               Immature Grans (Abs) 0.03  Comment: Mild elevation in immature granulocytes is non specific and   can be seen in a variety of conditions including stress response,   acute inflammation, trauma and pregnancy. Correlation with other   laboratory and clinical findings is essential.           0.01  Comment: Mild elevation in immature granulocytes is non specific and   can be seen in a variety of conditions including stress response,   acute inflammation, trauma and pregnancy. Correlation with other   laboratory and clinical findings is essential.         Immature Granulocytes 0.5         0.2       INDIRECT ULISES               Iron               Ketones, UA               Leukocytes, UA               Lymph # 0.5         0.9       Lymph % 8.0         19.1       MCH 25.6         25.3       MCHC 30.2         29.4       MCV 85         86       Mono # 0.6         0.6       Mono % 11.4         14.0       MPV 9.8         10.3       NITRITE UA               nRBC 1         1       Occult Blood               Occult Blood UA               Ovalocytes               pH, UA               Platelet Estimate               Platelets 200         199       POCT Glucose   120     120         Poik               Potassium         3.7       Product Code               PROTEIN TOTAL               Protein, UA               RBC 3.44         2.93       RDW 17.8         17.6       Saturated Iron               Sodium         142       Specific [place], UA               Specimen UA               Target Cells               Tear Drop Cells               TIBC               Transferrin               Troponin I     0.119  Comment: The reference interval for Troponin I represents the 99th percentile   cutoff   for our facility and is consistent with 3rd generation assay   performance.             UNIT NUMBER               UROBILINOGEN UA               WBC 5.61         4.44                        12/22/21  7504    12/22/21  1600   12/22/21  1502   12/22/21  1445        Unit Blood Type Code       5100  [P]              5100              5100       Unit Expiration       [id number]  [P]              [id number]       Unit Blood Type       O POS  [P]              O POS              O POS       Albumin       3.9       Alkaline Phosphatase       78       ALT       18       Anion Gap       13       Aniso       Slight       Appearance, UA   Clear           AST       26       Baso #       0.04       Basophil %       1.1       Bilirubin (UA)   Negative           BILIRUBIN TOTAL       0.5  Comment: For infants and newborns, interpretation of results should be based  on gestational age, weight and in agreement with clinical  observations.    Premature Infant recommended reference ranges:  Up to 24 hours.............<8.0 mg/dL  Up to 48 hours............<12.0 mg/dL  3-5 days..................<15.0 mg/dL  6-29 days.................<15.0 mg/dL         BUN       28       Calcium       8.4       Chloride       106       CO2       23       CODING SYSTEM       [id number]  [P]              ITQV019              [id number]       Color, UA   Yellow           Creatinine       2.1       Differential Method       Automated       DISPENSE STATUS       ISSUED  [P]              TRANSFUSED              TRANSFUSED       eGFR if        34       eGFR if non        29  Comment: Calculation used to obtain the estimated glomerular filtration  rate (eGFR) is the CKD-EPI equation.          Eos #       0.1       Eosinophil %       1.3       Ferritin       15       Glucose       122       Glucose, UA   Negative           Gran # (ANC)       2.6       Gran %       69.8       Group & Rh       O POS       Hematocrit       20.2       Hemoglobin       5.8  Comment: HGB  critical result(s) called and verbal readback obtained from   Mary Jane poole RN by TD3 12/22/2021 15:06         Hypo       Occasional        Immature Grans (Abs)       0.01  Comment: Mild elevation in immature granulocytes is non specific and   can be seen in a variety of conditions including stress response,   acute inflammation, trauma and pregnancy. Correlation with other   laboratory and clinical findings is essential.         Immature Granulocytes       0.3       INDIRECT ULISES       NEG       Iron       12       Ketones, UA   Negative           Leukocytes, UA   Negative           Lymph #       0.5       Lymph %       12.8       MCH       24.7       MCHC       28.7       MCV       86       Mono #       0.6       Mono %       14.7       MPV       9.3       NITRITE UA   Negative           nRBC       1       Occult Blood     Negative         Occult Blood UA   Negative           Ovalocytes       Occasional       pH, UA   6.0           Platelet Estimate       Appears normal       Platelets       185       POCT Glucose             Poik       Slight       Potassium       3.6       Product Code       [id number]  [P]              D3540C33              [id number]       PROTEIN TOTAL       6.8       Protein, UA   Trace  Comment: Recommend a 24 hour urine protein or a urine   protein/creatinine ratio if globulin induced proteinuria is  clinically suspected.             RBC       2.35       RDW       18.7       Saturated Iron       3       Sodium       142       Specific Gravity, UA   1.020           Specimen UA   Urine, Clean Catch           Target Cells       Occasional       Tear Drop Cells       Occasional       TIBC       454       Transferrin       307       Troponin I 0.087  Comment: The reference interval for Troponin I represents the 99th percentile   cutoff   for our facility and is consistent with 3rd generation assay   performance.         0.064  Comment: The reference interval for Troponin I represents the 99th percentile   cutoff   for our facility and is consistent with 3rd generation assay   performance.         UNIT NUMBER        [id number]  [P]              [id number]              [id number]       UROBILINOGEN UA   Negative           WBC       3.75              [P] - Preliminary Result             Significant Imaging: I have reviewed all pertinent imaging results/findings within the past 24 hours.

## 2021-12-23 NOTE — ASSESSMENT & PLAN NOTE
--permanent AF for at least a few years by ECGs and PPm in VVIR - Bi V paced   --sees Dr. Carrero as OP  --hold eliquis

## 2021-12-23 NOTE — PLAN OF CARE
Pt AAO x4.  Pt remains free of falls throughout shift.  Pt denies pain throughout shift.  Plan of care reviewed. Pt verbalizes understanding.  Pt moving and turning independently. Frequent weight shifting encouraged.  Normal sinus rhythm on monitor.  I unit of blood given today. Will recheck H/H.   Hourly rounding completed.  Will continue to monitor.

## 2021-12-24 ENCOUNTER — PATIENT MESSAGE (OUTPATIENT)
Dept: HEMATOLOGY/ONCOLOGY | Facility: CLINIC | Age: 81
End: 2021-12-24
Payer: MEDICARE

## 2021-12-24 VITALS
BODY MASS INDEX: 33.14 KG/M2 | RESPIRATION RATE: 18 BRPM | DIASTOLIC BLOOD PRESSURE: 60 MMHG | OXYGEN SATURATION: 95 % | HEART RATE: 60 BPM | SYSTOLIC BLOOD PRESSURE: 127 MMHG | HEIGHT: 69 IN | TEMPERATURE: 99 F | WEIGHT: 223.75 LBS

## 2021-12-24 DIAGNOSIS — D63.1 ANEMIA ASSOCIATED WITH STAGE 4 CHRONIC RENAL FAILURE: Primary | ICD-10-CM

## 2021-12-24 DIAGNOSIS — N18.4 ANEMIA ASSOCIATED WITH STAGE 4 CHRONIC RENAL FAILURE: Primary | ICD-10-CM

## 2021-12-24 DIAGNOSIS — D50.0 IRON DEFICIENCY ANEMIA DUE TO CHRONIC BLOOD LOSS: ICD-10-CM

## 2021-12-24 LAB — POCT GLUCOSE: 128 MG/DL (ref 70–110)

## 2021-12-24 PROCEDURE — 96361 HYDRATE IV INFUSION ADD-ON: CPT

## 2021-12-24 PROCEDURE — 99214 PR OFFICE/OUTPT VISIT, EST, LEVL IV, 30-39 MIN: ICD-10-PCS | Mod: HCNC,,, | Performed by: INTERNAL MEDICINE

## 2021-12-24 PROCEDURE — 99214 OFFICE O/P EST MOD 30 MIN: CPT | Mod: HCNC,,, | Performed by: INTERNAL MEDICINE

## 2021-12-24 PROCEDURE — G0378 HOSPITAL OBSERVATION PER HR: HCPCS | Mod: HCNC

## 2021-12-24 PROCEDURE — A4216 STERILE WATER/SALINE, 10 ML: HCPCS | Mod: HCNC | Performed by: NURSE PRACTITIONER

## 2021-12-24 PROCEDURE — 25000003 PHARM REV CODE 250: Mod: HCNC | Performed by: NURSE PRACTITIONER

## 2021-12-24 RX ORDER — NAPROXEN SODIUM 220 MG/1
81 TABLET, FILM COATED ORAL DAILY
Status: DISCONTINUED | OUTPATIENT
Start: 2021-12-24 | End: 2021-12-24 | Stop reason: HOSPADM

## 2021-12-24 RX ORDER — NAPROXEN SODIUM 220 MG/1
81 TABLET, FILM COATED ORAL DAILY
Qty: 30 TABLET | Refills: 0
Start: 2021-12-24 | End: 2022-01-01

## 2021-12-24 RX ADMIN — SODIUM CHLORIDE: 0.9 INJECTION, SOLUTION INTRAVENOUS at 04:12

## 2021-12-24 RX ADMIN — SPIRONOLACTONE 25 MG: 25 TABLET ORAL at 08:12

## 2021-12-24 RX ADMIN — PREGABALIN 150 MG: 75 CAPSULE ORAL at 08:12

## 2021-12-24 RX ADMIN — Medication 10 ML: at 06:12

## 2021-12-24 RX ADMIN — PANTOPRAZOLE SODIUM 40 MG: 40 TABLET, DELAYED RELEASE ORAL at 08:12

## 2021-12-24 RX ADMIN — CHOLECALCIFEROL TAB 125 MCG (5000 UNIT) 5000 UNITS: 125 TAB at 08:12

## 2021-12-24 RX ADMIN — FERROUS SULFATE TAB 325 MG (65 MG ELEMENTAL FE) 1 EACH: 325 (65 FE) TAB at 08:12

## 2021-12-24 RX ADMIN — ATORVASTATIN CALCIUM 80 MG: 40 TABLET, FILM COATED ORAL at 08:12

## 2021-12-24 RX ADMIN — MULTIPLE VITAMINS W/ MINERALS TAB 1 TABLET: TAB at 08:12

## 2021-12-24 NOTE — PLAN OF CARE
[facility] - Telemetry (Hospital)  Discharge Final Note    Primary Care Provider: Byron Stevens MD    Expected Discharge Date: 12/24/2021    Final Discharge Note (most recent)     Final Note - 12/24/21 1252        Final Note    Assessment Type Final Discharge Note     Anticipated Discharge Disposition Home or Self Care     Hospital Resources/Appts/Education Provided Appointments scheduled and added to AVS        Post-Acute Status    Discharge Delays None known at this time                 Important Message from Medicare             Contact Info     Byron Stevens MD   Specialty: Family Medicine   Relationship: PCP - General    [address] [place] [id number]   Phone: [phone]       Next Steps: Follow up in 3 day(s)    Tristin Nath MD   Specialty: Hematology and Oncology    [address]  [place] [address]   Phone: [phone]       Next Steps: Follow up in 1 week(s)    Instructions: Note sent to MD office to call you with date and time of follow up appointment for in 1 week.    Gastroenterology-video capsule        Next Steps: Follow up

## 2021-12-24 NOTE — SUBJECTIVE & OBJECTIVE
Interval History:  Spoke with patient and wife at bedside.    Oncology Treatment Plan:   [No treatment plan]    Medications:  Continuous Infusions:   sodium chloride 0.9% 75 mL/hr at 12/24/21 0603     Scheduled Meds:   atorvastatin  80 mg Oral Daily    cholecalciferol (vitamin D3)  5,000 Units Oral Daily    epoetin niraj  50 Units/kg Subcutaneous Once    ferrous sulfate  1 tablet Oral Daily    multivitamin  1 tablet Oral Daily    pantoprazole  40 mg Oral Daily    pregabalin  150 mg Oral BID    sodium chloride 0.9%  10 mL Intravenous Q8H    spironolactone  25 mg Oral Daily     PRN Meds:sodium chloride, sodium chloride, acetaminophen, albuterol, clonazePAM, dextrose 50%, dextrose 50%, glucagon (human recombinant), glucose, glucose, hydrALAZINE, insulin aspart U-100, melatonin, naloxone, ondansetron, polyethylene glycol, promethazine, simethicone     Review of Systems   Constitutional: Positive for fatigue. Negative for activity change, appetite change, chills, diaphoresis, fever and unexpected weight change.   HENT: Negative for congestion, dental problem, drooling, ear discharge, ear pain, facial swelling, hearing loss, mouth sores, nosebleeds, postnasal drip, rhinorrhea, sinus pressure, sneezing, sore throat, tinnitus, trouble swallowing and voice change.    Eyes: Negative for photophobia, pain, discharge, redness, itching and visual disturbance.   Respiratory: Negative for apnea, cough, choking, chest tightness, shortness of breath, wheezing and stridor.    Cardiovascular: Negative for chest pain, palpitations and leg swelling.   Gastrointestinal: Negative for abdominal distention, abdominal pain, anal bleeding, blood in stool, constipation, diarrhea, nausea, rectal pain and vomiting.   Endocrine: Negative for cold intolerance, heat intolerance, polydipsia, polyphagia and polyuria.   Genitourinary: Negative for decreased urine volume, difficulty urinating, dysuria, enuresis, flank pain, frequency, genital  sores, hematuria, penile discharge, penile pain, penile swelling, scrotal swelling, testicular pain and urgency.   Musculoskeletal: Negative for arthralgias, back pain, gait problem, joint swelling, myalgias, neck pain and neck stiffness.   Skin: Negative for color change, pallor, rash and wound.   Allergic/Immunologic: Negative for environmental allergies, food allergies and immunocompromised state.   Neurological: Positive for weakness. Negative for dizziness, tremors, seizures, syncope, facial asymmetry, speech difficulty, light-headedness, numbness and headaches.   Hematological: Negative for adenopathy. Does not bruise/bleed easily.   Psychiatric/Behavioral: Positive for dysphoric mood. Negative for agitation, behavioral problems, confusion, decreased concentration, hallucinations, self-injury, sleep disturbance and suicidal ideas. The patient is nervous/anxious. The patient is not hyperactive.      Objective:     Vital Signs (Most Recent):  Temp: 98.6 °F (37 °C) (12/24/21 0738)  Pulse: 60 (12/24/21 0738)  Resp: 18 (12/24/21 0738)  BP: 127/60 (12/24/21 0738)  SpO2: 95 % (12/24/21 0738) Vital Signs (24h Range):  Temp:  [97.7 °F (36.5 °C)-99 °F (37.2 °C)] 98.6 °F (37 °C)  Pulse:  [56-85] 60  Resp:  [15-18] 18  SpO2:  [93 %-98 %] 95 %  BP: (100-145)/(55-80) 127/60     Weight: 101.5 kg (223 lb 12.3 oz)  Body mass index is 33.04 kg/m².  Body surface area is 2.22 meters squared.      Intake/Output Summary (Last 24 hours) at 12/24/2021 1020  Last data filed at 12/24/2021 0603  Gross per 24 hour   Intake 2373.92 ml   Output 1050 ml   Net 1323.92 ml       Physical Exam  Vitals reviewed.   Constitutional:       General: He is not in acute distress.     Appearance: He is well-developed and well-nourished. He is obese. He is not diaphoretic.   HENT:      Head: Normocephalic.      Right Ear: External ear normal.      Left Ear: External ear normal.      Nose: Nose normal.      Right Sinus: No maxillary sinus tenderness or  frontal sinus tenderness.      Left Sinus: No maxillary sinus tenderness or frontal sinus tenderness.      Mouth/Throat:      Mouth: Oropharynx is clear and moist.      Pharynx: No oropharyngeal exudate.   Eyes:      General: Lids are normal. No scleral icterus.        Right eye: No discharge.         Left eye: No discharge.      Extraocular Movements: EOM normal.      Right eye: Normal extraocular motion.      Left eye: Normal extraocular motion.      Conjunctiva/sclera:      Right eye: Right conjunctiva is not injected. No hemorrhage.     Left eye: Left conjunctiva is not injected. No hemorrhage.     Pupils: Pupils are equal, round, and reactive to light.   Neck:      Thyroid: No thyromegaly.      Vascular: No JVD.      Trachea: No tracheal deviation.   Cardiovascular:      Rate and Rhythm: Normal rate.   Pulmonary:      Effort: Pulmonary effort is normal. No respiratory distress.      Breath sounds: No stridor.   Chest:   Breasts:      Right: No supraclavicular adenopathy.      Left: No supraclavicular adenopathy.       Abdominal:      General: Bowel sounds are normal.      Palpations: Abdomen is soft. There is no hepatomegaly, splenomegaly, hepatosplenomegaly or mass.      Tenderness: There is no abdominal tenderness.   Musculoskeletal:         General: No tenderness or edema. Normal range of motion.      Cervical back: Normal range of motion and neck supple.   Lymphadenopathy:      Head:      Right side of head: No posterior auricular or occipital adenopathy.      Left side of head: No posterior auricular or occipital adenopathy.      Cervical: No cervical adenopathy.      Right cervical: No superficial, deep or posterior cervical adenopathy.     Left cervical: No superficial, deep or posterior cervical adenopathy.      Upper Body:   No axillary adenopathy present.     Right upper body: No supraclavicular adenopathy.      Left upper body: No supraclavicular adenopathy.   Skin:     General: Skin is dry.       Findings: No erythema or rash.      Nails: There is no clubbing or cyanosis.   Neurological:      Mental Status: He is alert and oriented to person, place, and time.      Cranial Nerves: No cranial nerve deficit.      Coordination: Coordination normal.      Deep Tendon Reflexes: Strength normal.   Psychiatric:         Mood and Affect: Mood and affect normal.         Behavior: Behavior normal.         Thought Content: Thought content normal.         Cognition and Memory: Cognition and memory normal.         Judgment: Judgment normal.         Significant Labs:   BMP:   Recent Labs   Lab 12/22/21  1445 12/23/21  0503   * 104    142   K 3.6 3.7    110   CO2 23 22*   BUN 28* 25*   CREATININE 2.1* 2.0*   CALCIUM 8.4* 8.1*   , CBC:   Recent Labs   Lab 12/22/21  1445 12/23/21  0503 12/23/21  1313   WBC 3.75* 4.44 5.61   HGB 5.8* 7.4* 8.8*   HCT 20.2* 25.2* 29.1*    199 200   , CMP:   Recent Labs   Lab 12/22/21  1445 12/23/21  0503    142   K 3.6 3.7    110   CO2 23 22*   * 104   BUN 28* 25*   CREATININE 2.1* 2.0*   CALCIUM 8.4* 8.1*   PROT 6.8  --    ALBUMIN 3.9  --    BILITOT 0.5  --    ALKPHOS 78  --    AST 26  --    ALT 18  --    ANIONGAP 13 10   EGFRNONAA 29* 31*   , Coagulation: No results for input(s): PT, INR, APTT in the last 48 hours., Haptoglobin: No results for input(s): HAPTOGLOBIN in the last 48 hours., Immunology: No results for input(s): SPEP, PAUL, MARIANO, FREELAMBDALI in the last 48 hours., LDH: No results for input(s): LDHCSF, BFSOURCE in the last 48 hours., LFTs:   Recent Labs   Lab 12/22/21  1445   ALT 18   AST 26   ALKPHOS 78   BILITOT 0.5   PROT 6.8   ALBUMIN 3.9   , Reticulocytes: No results for input(s): RETIC in the last 48 hours., Tumor Markers: No results for input(s): PSA, CEA, , AFPTM, [id number],  in the last 48 hours.    Invalid input(s): ALGTM, Uric Acid No results for input(s): URICACID in the last 48 hours. and Urine Studies:   Recent Labs    Lab 12/22/21  1600   COLORU Yellow   APPEARANCEUA Clear   PHUR 6.0   SPECGRAV 1.020   PROTEINUA Trace*   GLUCUA Negative   KETONESU Negative   BILIRUBINUA Negative   OCCULTUA Negative   NITRITE Negative   UROBILINOGEN Negative   LEUKOCYTESUR Negative       Diagnostic Results:  I have reviewed all pertinent imaging results/findings within the past 24 hours.

## 2021-12-24 NOTE — DISCHARGE SUMMARY
[facility] - Tele[facility] ([facility])  [facility]  Discharge Summary      Patient Name: Jake Ortiz  MRN: [id number]  Patient Class: OP- Observation  Admission Date: 12/22/2021  Hospital Length of Stay: 0 days  Discharge Date and Time:  12/24/2021 11:15 AM  Attending Physician: Aram Srivastava MD   Discharging Provider: Gonzalez Barone NP  Primary Care Provider: Byron Stevens MD      HPI:   82 y/o male with PMHx of HTN, HLD, DM, CHF, CAD, a fib, and tobacco use who presented to the Ed with c/o weakness that onset gradually several days ago. Sx are constant and moderate in severity. No exacerbating or mitigating factors reported. Associated sx include KING, SOB, and red stool. Pt denies dizziness, HA, congestion, cough, palpitations, CP, abd pain, dysuria, ankle edema, and all other sx at this time. Pt is seen by Dr. Nath for Pernicious anemia, and has had extensive workup by GI for anemia with no source of bleeding identified. Pt follows Dr. Aquino for ckd.  ED workup shows: Occult stool negative, H/H 5.8/20.2, WBC 3.75K, RBC 2.35, Ferritin 15, BUN 28, Cr. 2.1, eGFR 29, glucose 122, troponin 0.064  CXR shows There is no focal pulmonary infiltrate visualized.  2. There is blunting of the right costophrenic angle.  This is characteristic of a tiny pleural effusion.  He is a full code and his SDM is his wife, Xuan  He will be kept on OBS for symptomatic anemia under the care of \[facility]\"" medicine.      * No surgery found *      Hospital Course:   Patient was kept on OBS for symptomatic anemia under the care of \[facility]\"" medicine.  12/23: Hgb increased to 7.4 overnight with transfusion of 2u pRBC. Pt is still very tired. Troponin increased to 0.118, pt mukesh CP, palpitations, but admits to SOB with exertion. cardiology consulted. GI feels like no indication for intervention, will continue OP f/u for VCE that is scheduled for next week, they have signed off. Heme/onc following - will start IV iron and continue  OP f/u as scheduled.  12/24/21 Hemoglobin stable. Will have VCE completed outpatient per GI. Okay to resume Eliquis 2.5mg daily BID. He was asked to hold Plavix and start ASA for CAD until after GI test.          Goals of Care Treatment Preferences:  Code Status: Full Code      Consults:   Consults (From admission, onward)        Status Ordering Provider     Inpatient consult to Cardiology  Once        Provider:  Curt Kim MD    Completed CEE MEAD     Inpatient consult to Hematology/Oncology  Once        Provider:  Mikey George MD    Completed CEE MEAD     Inpatient consult to Gastroenterology  Once        Provider:  Stephanie Resendiz MD    Completed CEE MEAD          No new Assessment & Plan notes have been filed under this hospital service since the last note was generated.  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    Specialty: Family Medicine  Contact information:  [address] [facility] [address] [phone]             Tristin Nath MD In 1 week.    Specialty: Hematology and Oncology  Contact information:  [address] [facility]  [address]  [phone]             Gastroenterology-video capsule.                     Patient Instructions:      Ambulatory referral/consult to Outpatient Case Management   Referral Priority: Routine Referral Type: Consultation   Referral Reason: Specialty Services Required   Number of Visits Requested: 1       Significant Diagnostic Studies: Labs:   CMP   Recent Labs   Lab 12/22/21  1445 12/23/21  0503    142   K 3.6 3.7    110   CO2 23 22*   * 104   BUN 28* 25*   CREATININE 2.1* 2.0*   CALCIUM 8.4* 8.1*   PROT 6.8  --    ALBUMIN 3.9  --    BILITOT 0.5  --    ALKPHOS 78  --    AST 26  --    ALT 18  --    ANIONGAP 13 10   ESTGFRAFRICA 34* 36*   EGFRNONAA 29* 31*    and CBC   Recent Labs   Lab 12/22/21  1445 12/22/21  1445 12/23/21  0503 12/23/21  0503 12/23/21  1313   WBC 3.75*  --  4.44  --  5.61   HGB 5.8*  --  7.4*  --  8.8*   HCT 20.2*   < > 25.2*   < > 29.1*     --  199  --  200    < > = values in this interval not displayed.       Pending Diagnostic Studies:     None         Medications:  Reconciled Home Medications:      Medication List      START taking these medications    aspirin 81 MG Chew  Take 1 tablet (81 mg total) by mouth once daily.        CHANGE how you take these medications    apixaban 2.5 mg Tab  Commonly known as: ELIQUIS  Take 1 tablet (2.5 mg total) by mouth 2 (two) times daily.  What changed:   · medication strength  · how much to take        CONTINUE taking these medications    acetaminophen 500 MG tablet  Commonly known as: TYLENOL  Take 500 mg by mouth every 6 (six) hours as needed for Pain.     albuterol 1.25 mg/3 mL Nebu  Commonly known as: ACCUNEB  Take 3 mLs (1.25 mg total) by nebulization every 6 (six) hours as needed (cough and SOB). Rescue     atorvastatin 80 MG tablet  Commonly known as: LIPITOR  One tablet daily     blood sugar diagnostic Strp  Check blood glucose levels daily in the AM fasting and 1-2 times more daily.     blood-glucose meter kit  Use as  instructed     cholecalciferol (vitamin D3) 25 mcg (1,000 unit) capsule  Commonly known as: VITAMIN D3  Take 5,000 Units by mouth once daily.     clonazePAM 1 MG tablet  Commonly known as: KlonoPIN  Take 1 tablet (1 mg total) by mouth nightly as needed for Anxiety.     ferrous sulfate 325 mg (65 mg iron) Tab tablet  Commonly known as: FEOSOL  Take 325 mg by mouth daily with breakfast.     fluticasone propionate 50 mcg/actuation nasal spray  Commonly known as: FLONASE  2 sprays (100 mcg total) by Each Nostril route once daily.     furosemide 20 MG tablet  Commonly known as: LASIX  Take 1 tablet (20 mg total) by mouth 2 (two) times daily. Can double to 2 tablets twice a day for 3 days for more swelling/fluid build up     lancets Misc  Check blood glucose levels daily in the AM fasting and 1-2 times more daily.     levocetirizine 5 MG tablet  Commonly known as: XYZAL  Take 1 tablet (5 mg total) by mouth every evening.     losartan 50 MG tablet  Commonly known as: COZAAR  Take 1 tablet (50 mg total) by mouth once daily.     multivitamin capsule  Take 1 capsule by mouth once daily.     pantoprazole 40 MG tablet  Commonly known as: PROTONIX  Take 1 tablet (40 mg total) by mouth once daily.     pregabalin 75 MG capsule  Commonly known as: LYRICA  Take 1 capsule, 75 mg, once daily for 1 week.  Followed by take 1 capsule 75 mg twice daily for 1 week.  Followed by 2 capsules, 150 mg in the morning and 75 mg at night for 1 week.  Followed by 150 mg twice daily for 1 week.  Followed by 225 mg in the morning and 150 mg in the evening for 1 week.  Followed by 225 mg b.i.d..     spironolactone 25 MG tablet  Commonly known as: ALDACTONE  Take 1 tablet (25 mg total) by mouth once daily.        STOP taking these medications    clopidogreL 75 mg tablet  Commonly known as: PLAVIX            Indwelling Lines/Drains at time of discharge:   Lines/Drains/Airways     None                 Time spent on the discharge of patient: >35  naomie Barone NP  Department of [facility] Medicine  [facility] - Telemetry ([facility])

## 2021-12-24 NOTE — CONSULTS
[facility] - Tele[facility] ([facility]  Cardiology  Consult Note    Patient Name: Jake Ortiz  MRN: [id number]  Admission Date: 12/22/2021  Hospital Length of Stay: 0 days  Code Status: Full Code   Attending Provider: Albaro Castellanos MD   Consulting Provider: Curt Kim MD  Primary Care Physician: Byron Stevens MD  Principal Problem:Symptomatic anemia    Patient information was obtained from patient and ER records.     Inpatient consult to Cardiology  Consult performed by: Curt Kim MD  Consult ordered by: DOMINGO Melgoza-AIDA        Subjective:       HPI:   Consult for elevated troponin and CAD s/p CABG  Jake Ortiz is a 81 y.o. male pt with CAD s/p CABG, old MI, HFPEF with TR/MR, AVB s/p CRT, PAF on Eliquis,  Anemia, HTN, HLD, DM2, NASREEN, CKD4, GERD, Restrictive/obstructive lung disease.  Came in with severe anemia. Improved weakness and SOB after 2 units PRBC,  Denied chest pain   Ekg V pacing  Anemia HGB 5.8, up to 7.7 after 2 units PRBC  troponin 0.064>>0.087>>0.119  baseline BUN 17, Cr. 1.8, given IVF's in ED, repeat BMP shows BUN 25, Cr. 2.0  Eliquis held 3 days ago  Echo 06/2020 EF 50% mild to mod MR  GI will do capsule endo.       Past Medical History:   Diagnosis Date    Abnormal PFT     Anemia     Arthritis     Atrial fibrillation 10/19/2017    Back pain     Congestive heart failure 3/5/2018    Coronary artery disease     DM (diabetes mellitus) 2018     am 06/23/2020    DM (diabetes mellitus) 2016     am 08/17/2021    Hyperlipemia     Hypertension     Myocardial infarction     Obesity     NASREEN (obstructive sleep apnea) 6/5/2018    Prostate cancer 2015    Tobacco dependence     Type 2 diabetes mellitus        Past Surgical History:   Procedure Laterality Date    COLONOSCOPY N/A 9/22/2020    Procedure: COLONOSCOPY;  Surgeon: Javier Farmer MD;  Location: [facility] ENDO;  Service: Endoscopy;  Laterality: N/A;    CORONARY ARTERY BYPASS GRAFT  1987    EPIDURAL STEROID  INJECTION N/A 11/22/2019    Procedure: Lumbar L5/S1 IL XUAN;  Surgeon: Tacho Trivedi MD;  Location: [facility] PAIN MGT;  Service: Pain Management;  Laterality: N/A;    EPIDURAL STEROID INJECTION N/A 1/6/2020    Procedure: Lumbar L5/S1 IL XUAN;  Surgeon: Tacho Trivedi MD;  Location: [facility] PAIN [facility]GT;  Service: Pain Management;  Laterality: N/A;    EPIDURAL STEROID INJECTION N/A 2/10/2020    Procedure: Caudal XUAN;  Surgeon: Tacho Trivedi MD;  Location: [facility] PAIN [facility]GT;  Service: Pain Management;  Laterality: N/A;    ESOPHAGOGASTRODUODENOSCOPY N/A 9/22/2020    Procedure: EGD (ESOPHAGOGASTRODUODENOSCOPY);  Surgeon: Javier Farmer MD;  Location: [facility] ENDO;  Service: Endoscopy;  Laterality: N/A;    INJECTION OF ANESTHETIC AGENT AROUND MEDIAL BRANCH NERVES INNERVATING LUMBAR FACET JOINT Bilateral 10/12/2020    Procedure: Bilateral L3-5 MBB;  Surgeon: Tacho Trivedi MD;  Location: [facility]GT;  Service: Pain Management;  Laterality: Bilateral;    INJECTION OF ANESTHETIC AGENT AROUND MEDIAL BRANCH NERVES INNERVATING LUMBAR FACET JOINT Bilateral 12/21/2020    Procedure: Bilateral L3-5 MBB with steroid;  Surgeon: Tacho Trivedi MD;  Location: [facility] PAIN [facility]GT;  Service: Pain Management;  Laterality: Bilateral;    PROSTATE SURGERY      prostate radiation    RADIOFREQUENCY THERMOCOAGULATION Left 6/4/2021    Procedure: Left L3-5 Lumbar RFA;  Surgeon: Tacho Trivedi MD;  Location: [facility] PAIN [facility]GT;  Service: Pain Management;  Laterality: Left;    RADIOFREQUENCY THERMOCOAGULATION Right 6/18/2021    Procedure: Right L3-5 Lumbar RFA;  Surgeon: Tacho Trivedi MD;  Location: [facility]GT;  Service: Pain Management;  Laterality: Right;    SPINE SURGERY      fusion    TONSILLECTOMY         Review of patient's allergies indicates:  No Known Allergies    Current Facility-Administered Medications on File Prior to Encounter   Medication    ondansetron injection 4 mg     Current Outpatient Medications on File  Prior to Encounter   Medication Sig    acetaminophen (TYLENOL) 500 MG tablet Take 500 mg by mouth every 6 (six) hours as needed for Pain.    albuterol (ACCUNEB) 1.25 mg/3 mL Nebu Take 3 mLs (1.25 mg total) by nebulization every 6 (six) hours as needed (cough and SOB). Rescue    apixaban (ELIQUIS) 5 mg Tab Take 1 tablet (5 mg total) by mouth 2 (two) times daily.    atorvastatin (LIPITOR) 80 MG tablet One tablet daily    blood sugar diagnostic Strp Check blood glucose levels daily in the AM fasting and 1-2 times more daily.    blood-glucose meter kit Use as instructed    cholecalciferol, vitamin D3, (VITAMIN D3) 25 mcg (1,000 unit) capsule Take 5,000 Units by mouth once daily.    clonazePAM (KLONOPIN) 1 MG tablet Take 1 tablet (1 mg total) by mouth nightly as needed for Anxiety.    clopidogreL (PLAVIX) 75 mg tablet Take 1 tablet (75 mg total) by mouth once daily.    ferrous sulfate (FEOSOL) 325 mg (65 mg iron) Tab tablet Take 325 mg by mouth daily with breakfast.    fluticasone propionate (FLONASE) 50 mcg/actuation nasal spray 2 sprays (100 mcg total) by Each Nostril route once daily.    furosemide (LASIX) 20 MG tablet Take 1 tablet (20 mg total) by mouth 2 (two) times daily. Can double to 2 tablets twice a day for 3 days for more swelling/fluid build up    lancets Misc Check blood glucose levels daily in the AM fasting and 1-2 times more daily.    levocetirizine (XYZAL) 5 MG tablet Take 1 tablet (5 mg total) by mouth every evening.    losartan (COZAAR) 50 MG tablet Take 1 tablet (50 mg total) by mouth once daily.    multivitamin capsule Take 1 capsule by mouth once daily.    pantoprazole (PROTONIX) 40 MG tablet Take 1 tablet (40 mg total) by mouth once daily.    pregabalin (LYRICA) 75 MG capsule Take 1 capsule, 75 mg, once daily for 1 week.  Followed by take 1 capsule 75 mg twice daily for 1 week.  Followed by 2 capsules, 150 mg in the morning and 75 mg at night for 1 week.  Followed by 150 mg twice  daily for 1 week.  Followed by 225 mg in the morning and 150 mg in the evening for 1 week.  Followed by 225 mg b.i.d..    spironolactone (ALDACTONE) 25 MG tablet Take 1 tablet (25 mg total) by mouth once daily.     Family History     Problem Relation (Age of Onset)    Cataracts Mother    Diabetes Mother    Heart attack Mother    Heart disease Mother, Father, Brother    Stroke Father        Tobacco Use    Smoking status: Former Smoker     Packs/day: 1.50     Years: 20.00     Pack years: 30.00     Types: Cigarettes     Start date:      Quit date:      Years since quittin.0    Smokeless tobacco: Never Used   Substance and Sexual Activity    Alcohol use: No    Drug use: No    Sexual activity: Not Currently     Review of Systems   Constitutional: Positive for malaise/fatigue. Negative for decreased appetite, diaphoresis, fever and night sweats.   HENT: Negative for nosebleeds.    Eyes: Negative for blurred vision and double vision.   Cardiovascular: Positive for dyspnea on exertion. Negative for chest pain, claudication, irregular heartbeat, leg swelling, near-syncope, orthopnea, palpitations, paroxysmal nocturnal dyspnea and syncope.   Respiratory: Negative for cough, shortness of breath, sleep disturbances due to breathing, snoring, sputum production and wheezing.    Endocrine: Negative for cold intolerance and polyuria.   Hematologic/Lymphatic: Does not bruise/bleed easily.   Skin: Negative for rash.   Musculoskeletal: Negative for back pain, falls, joint pain, joint swelling and neck pain.   Gastrointestinal: Negative for abdominal pain, heartburn, nausea and vomiting.   Genitourinary: Negative for dysuria, frequency and hematuria.   Neurological: Negative for difficulty with concentration, dizziness, focal weakness, headaches, light-headedness, numbness, seizures and weakness.   Psychiatric/Behavioral: Negative for depression, memory loss and substance abuse. The patient does not have insomnia.     Allergic/Immunologic: Negative for HIV exposure and hives.     Objective:     Vital Signs (Most Recent):  Temp: 97.7 °F (36.5 °C) (12/23/21 1540)  Pulse: 63 (12/23/21 1701)  Resp: 18 (12/23/21 1540)  BP: (!) 145/67 (12/23/21 1540)  SpO2: 98 % (12/23/21 1540) Vital Signs (24h Range):  Temp:  [97.6 °F (36.4 °C)-98.9 °F (37.2 °C)] 97.7 °F (36.5 °C)  Pulse:  [60-89] 63  Resp:  [16-19] 18  SpO2:  [95 %-100 %] 98 %  BP: (110-151)/(59-80) 145/67     Weight: 99.2 kg (218 lb 11.1 oz)  Body mass index is 32.3 kg/m².    SpO2: 98 %  O2 Device (Oxygen Therapy): room air      Intake/Output Summary (Last 24 hours) at 12/23/2021 1802  Last data filed at 12/23/2021 1718  Gross per 24 hour   Intake 1952.59 ml   Output 1050 ml   Net 902.59 ml       Lines/Drains/Airways     Peripheral Intravenous Line                 Peripheral IV - Single Lumen 12/22/21 1445 18 G Left Antecubital 1 day         Peripheral IV - Single Lumen 12/22/21 1813 20 G Right Antecubital <1 day                Physical Exam  Constitutional:       Appearance: He is well-nourished.   HENT:      Head: Normocephalic.   Eyes:      Pupils: Pupils are equal, round, and reactive to light.   Neck:      Thyroid: No thyromegaly.      Vascular: Normal carotid pulses. No carotid bruit or JVD.   Cardiovascular:      Rate and Rhythm: Normal rate and regular rhythm.  No extrasystoles are present.     Chest Wall: PMI is not displaced.      Pulses: Normal pulses.      Heart sounds: Normal heart sounds. No murmur heard.  No gallop. No S3 sounds.    Pulmonary:      Effort: No respiratory distress.      Breath sounds: Normal breath sounds. No stridor.   Abdominal:      General: Bowel sounds are normal.      Palpations: Abdomen is soft.      Tenderness: There is no abdominal tenderness. There is no rebound.   Musculoskeletal:         General: Normal range of motion.   Skin:     General: Skin is intact.      Findings: No rash.   Neurological:      Mental Status: He is alert and  oriented to person, place, and time.   Psychiatric:         Behavior: Behavior normal.         Significant Labs:   ABG: No results for input(s): PH, PCO2, HCO3, POCSATURATED, BE in the last 48 hours., Blood Culture: No results for input(s): LABBLOO in the last 48 hours., BMP:   Recent Labs   Lab 12/22/21  1445 12/23/21  0503   * 104    142   K 3.6 3.7    110   CO2 23 22*   BUN 28* 25*   CREATININE 2.1* 2.0*   CALCIUM 8.4* 8.1*   , CMP   Recent Labs   Lab 12/22/21  1445 12/23/21  0503    142   K 3.6 3.7    110   CO2 23 22*   * 104   BUN 28* 25*   CREATININE 2.1* 2.0*   CALCIUM 8.4* 8.1*   PROT 6.8  --    ALBUMIN 3.9  --    BILITOT 0.5  --    ALKPHOS 78  --    AST 26  --    ALT 18  --    ANIONGAP 13 10   ESTGFRAFRICA 34* 36*   EGFRNONAA 29* 31*   , CBC   Recent Labs   Lab 12/22/21  1445 12/22/21  1445 12/23/21  0503 12/23/21  0503 12/23/21  1313   WBC 3.75*  --  4.44  --  5.61   HGB 5.8*  --  7.4*  --  8.8*   HCT 20.2*   < > 25.2*   < > 29.1*     --  199  --  200    < > = values in this interval not displayed.   , INR No results for input(s): INR, PROTIME in the last 48 hours., Lipid Panel No results for input(s): CHOL, HDL, LDLCALC, TRIG, CHOLHDL in the last 48 hours. and Troponin   Recent Labs   Lab 12/22/21  1445 12/22/21  1813 12/23/21  0815   TROPONINI 0.064* 0.087* 0.119*       Significant Imaging: EKG:      Assessment and Plan:     * Symptomatic anemia  Capsule endo per GI  Continue iron infusion Rx    Atrial fibrillation  Advise to resume Eliquis 2.5 mg bid low dose once HGB stable  OP EP consult for LAAC device    S/P CABG x 3  See above ntoe    CAD (coronary artery disease)  D/c Plavix  Advice to switch to ASA 81mg  Continue Statin Losartan and Aldactone        VTE Risk Mitigation (From admission, onward)         Ordered     Reason for No Pharmacological VTE Prophylaxis  Once        Question:  Reasons:  Answer:  Physician Provided (leave comment)    12/22/21 1478      IP VTE HIGH RISK PATIENT  Once         12/22/21 1834     Place sequential compression device  Until discontinued         12/22/21 1834                Thank you for your consult. I will follow-up with patient. Please contact us if you have any additional questions.    Curt Kim MD  Cardiology   [facility] - Telemetry ([facility])

## 2021-12-24 NOTE — PLAN OF CARE
Pt remains fall free this shift.  Pt AAOx4, verbal, clear speech.  Skin warm and dry. No new skin issues.  Telemonitoring in progress, (SR  mid 70s to 80s with ocassional SB 56-59)  Pacemaker in place  Room air  Urinal  Standby assist with transfers.  CBG AC/ HS with PRN SS insulin.  NS continuous @ 75  Bed low, side rails up x 2, wheels locked, call light in reach.  Bed alarm maintained for safety.  Patient instructed to call for assistance.  Hourly rounding completed.  24 hour chart check completed  POC updated and reviewed with pt. Will continue POC.

## 2021-12-24 NOTE — NURSING
patient had episode this morning around 0645 where he jacob down to 33, 16 then asystole but patient was fine when I checked on him he was sitting up in bed asymptomatic. after checking his leads he shot up to the 60s and 70s, then back to 30s now back to 60s and 70s where he is currently still at. Secured chatted Dr. Curt Kim.

## 2021-12-24 NOTE — ASSESSMENT & PLAN NOTE
--Agree to continue ferrous sulfate 325 mg PO daily  --add venofer 200 mg IV x 1  --GI following. Plan for outpatient VCE  --Maintain hg >8  --plan to arrange outpatient follow up for additional IV iron PRN  12/24/2021.  The patient's hemoglobin has improved with transfusion iron repleted.  At this time out like to in escalate the dose of erythropoietin 57819 units weekly x4 doses.  I have will have her office scheduled for follow-up he will have a CBC done this week will communicate the results through the electronic portal and be seen back in 4 weeks with CBC BMP and iron status prior.  Discussed implications answered questions with patient they preferred Thursday for appointments orders have been placed and updated in the outpatient section for this total time spent 35 minutes

## 2021-12-24 NOTE — SUBJECTIVE & OBJECTIVE
Past Medical History:   Diagnosis Date    Abnormal PFT     Anemia     Arthritis     Atrial fibrillation 10/19/2017    Back pain     Congestive heart failure 3/5/2018    Coronary artery disease     DM (diabetes mellitus) 2018     am 06/23/2020    DM (diabetes mellitus) 2016     am 08/17/2021    Hyperlipemia     Hypertension     Myocardial infarction     Obesity     NASREEN (obstructive sleep apnea) 6/5/2018    Prostate cancer 2015    Tobacco dependence     Type 2 diabetes mellitus        Past Surgical History:   Procedure Laterality Date    COLONOSCOPY N/A 9/22/2020    Procedure: COLONOSCOPY;  Surgeon: Javier Farmer MD;  Location: [facility] ENDO;  Service: Endoscopy;  Laterality: N/A;    CORONARY ARTERY BYPASS GRAFT  1987    EPIDURAL STEROID INJECTION N/A 11/22/2019    Procedure: Lumbar L5/S1 IL XUAN;  Surgeon: Tacho Trivedi MD;  Location: [facility]GT;  Service: Pain Management;  Laterality: N/A;    EPIDURAL STEROID INJECTION N/A 1/6/2020    Procedure: Lumbar L5/S1 IL XUAN;  Surgeon: Tacho Trivedi MD;  Location: [facility];  Service: Pain Management;  Laterality: N/A;    EPIDURAL STEROID INJECTION N/A 2/10/2020    Procedure: Caudal XUAN;  Surgeon: Tacho Trivedi MD;  Location: [facility];  Service: Pain Management;  Laterality: N/A;    ESOPHAGOGASTRODUODENOSCOPY N/A 9/22/2020    Procedure: EGD (ESOPHAGOGASTRODUODENOSCOPY);  Surgeon: Javier Farmer MD;  Location: [facility] ENDO;  Service: Endoscopy;  Laterality: N/A;    INJECTION OF ANESTHETIC AGENT AROUND MEDIAL BRANCH NERVES INNERVATING LUMBAR FACET JOINT Bilateral 10/12/2020    Procedure: Bilateral L3-5 MBB;  Surgeon: Tacho Trivedi MD;  Location: [facility];  Service: Pain Management;  Laterality: Bilateral;    INJECTION OF ANESTHETIC AGENT AROUND MEDIAL BRANCH NERVES INNERVATING LUMBAR FACET JOINT Bilateral 12/21/2020    Procedure: Bilateral L3-5 MBB with steroid;  Surgeon: Tacho Trivedi MD;   Location: [facility];  Service: Pain Management;  Laterality: Bilateral;    PROSTATE SURGERY      prostate radiation    RADIOFREQUENCY THERMOCOAGULATION Left 6/4/2021    Procedure: Left L3-5 Lumbar RFA;  Surgeon: Tacho Trivedi MD;  Location: [facility];  Service: Pain Management;  Laterality: Left;    RADIOFREQUENCY THERMOCOAGULATION Right 6/18/2021    Procedure: Right L3-5 Lumbar RFA;  Surgeon: Tacho Trivedi MD;  [facility];  Service: Pain Management;  Laterality: Right;    SPINE SURGERY      fusion    TONSILLECTOMY         Review of patient's allergies indicates:  No Known Allergies    Current Facility-Administered Medications on File Prior to Encounter   Medication    ondansetron injection 4 mg     Current Outpatient Medications on File Prior to Encounter   Medication Sig    acetaminophen (TYLENOL) 500 MG tablet Take 500 mg by mouth every 6 (six) hours as needed for Pain.    albuterol (ACCUNEB) 1.25 mg/3 mL Nebu Take 3 mLs (1.25 mg total) by nebulization every 6 (six) hours as needed (cough and SOB). Rescue    apixaban (ELIQUIS) 5 mg Tab Take 1 tablet (5 mg total) by mouth 2 (two) times daily.    atorvastatin (LIPITOR) 80 MG tablet One tablet daily    blood sugar diagnostic Strp Check blood glucose levels daily in the AM fasting and 1-2 times more daily.    blood-glucose meter kit Use as instructed    cholecalciferol, vitamin D3, (VITAMIN D3) 25 mcg (1,000 unit) capsule Take 5,000 Units by mouth once daily.    clonazePAM (KLONOPIN) 1 MG tablet Take 1 tablet (1 mg total) by mouth nightly as needed for Anxiety.    clopidogreL (PLAVIX) 75 mg tablet Take 1 tablet (75 mg total) by mouth once daily.    ferrous sulfate (FEOSOL) 325 mg (65 mg iron) Tab tablet Take 325 mg by mouth daily with breakfast.    fluticasone propionate (FLONASE) 50 mcg/actuation nasal spray 2 sprays (100 mcg total) by Each Nostril route once daily.    furosemide (LASIX) 20 MG tablet Take 1 tablet (20  mg total) by mouth 2 (two) times daily. Can double to 2 tablets twice a day for 3 days for more swelling/fluid build up    lancets Misc Check blood glucose levels daily in the AM fasting and 1-2 times more daily.    levocetirizine (XYZAL) 5 MG tablet Take 1 tablet (5 mg total) by mouth every evening.    losartan (COZAAR) 50 MG tablet Take 1 tablet (50 mg total) by mouth once daily.    multivitamin capsule Take 1 capsule by mouth once daily.    pantoprazole (PROTONIX) 40 MG tablet Take 1 tablet (40 mg total) by mouth once daily.    pregabalin (LYRICA) 75 MG capsule Take 1 capsule, 75 mg, once daily for 1 week.  Followed by take 1 capsule 75 mg twice daily for 1 week.  Followed by 2 capsules, 150 mg in the morning and 75 mg at night for 1 week.  Followed by 150 mg twice daily for 1 week.  Followed by 225 mg in the morning and 150 mg in the evening for 1 week.  Followed by 225 mg b.i.d..    spironolactone (ALDACTONE) 25 MG tablet Take 1 tablet (25 mg total) by mouth once daily.     Family History     Problem Relation (Age of Onset)    Cataracts Mother    Diabetes Mother    Heart attack Mother    Heart disease Mother, Father, Brother    Stroke Father        Tobacco Use    Smoking status: Former Smoker     Packs/day: 1.50     Years: 20.00     Pack years: 30.00     Types: Cigarettes     Start date:      Quit date:      Years since quittin.0    Smokeless tobacco: Never Used   Substance and Sexual Activity    Alcohol use: No    Drug use: No    Sexual activity: Not Currently     Review of Systems   Constitutional: Positive for malaise/fatigue. Negative for decreased appetite, diaphoresis, fever and night sweats.   HENT: Negative for nosebleeds.    Eyes: Negative for blurred vision and double vision.   Cardiovascular: Positive for dyspnea on exertion. Negative for chest pain, claudication, irregular heartbeat, leg swelling, near-syncope, orthopnea, palpitations, paroxysmal nocturnal dyspnea and  syncope.   Respiratory: Negative for cough, shortness of breath, sleep disturbances due to breathing, snoring, sputum production and wheezing.    Endocrine: Negative for cold intolerance and polyuria.   Hematologic/Lymphatic: Does not bruise/bleed easily.   Skin: Negative for rash.   Musculoskeletal: Negative for back pain, falls, joint pain, joint swelling and neck pain.   Gastrointestinal: Negative for abdominal pain, heartburn, nausea and vomiting.   Genitourinary: Negative for dysuria, frequency and hematuria.   Neurological: Negative for difficulty with concentration, dizziness, focal weakness, headaches, light-headedness, numbness, seizures and weakness.   Psychiatric/Behavioral: Negative for depression, memory loss and substance abuse. The patient does not have insomnia.    Allergic/Immunologic: Negative for HIV exposure and hives.     Objective:     Vital Signs (Most Recent):  Temp: 97.7 °F (36.5 °C) (12/23/21 1540)  Pulse: 63 (12/23/21 1701)  Resp: 18 (12/23/21 1540)  BP: (!) 145/67 (12/23/21 1540)  SpO2: 98 % (12/23/21 1540) Vital Signs (24h Range):  Temp:  [97.6 °F (36.4 °C)-98.9 °F (37.2 °C)] 97.7 °F (36.5 °C)  Pulse:  [60-89] 63  Resp:  [16-19] 18  SpO2:  [95 %-100 %] 98 %  BP: (110-151)/(59-80) 145/67     Weight: 99.2 kg (218 lb 11.1 oz)  Body mass index is 32.3 kg/m².    SpO2: 98 %  O2 Device (Oxygen Therapy): room air      Intake/Output Summary (Last 24 hours) at 12/23/2021 1802  Last data filed at 12/23/2021 1718  Gross per 24 hour   Intake 1952.59 ml   Output 1050 ml   Net 902.59 ml       Lines/Drains/Airways     Peripheral Intravenous Line                 Peripheral IV - Single Lumen 12/22/21 1445 18 G Left Antecubital 1 day         Peripheral IV - Single Lumen 12/22/21 1813 20 G Right Antecubital <1 day                Physical Exam  Constitutional:       Appearance: He is well-nourished.   HENT:      Head: Normocephalic.   Eyes:      Pupils: Pupils are equal, round, and reactive to light.   Neck:       Thyroid: No thyromegaly.      Vascular: Normal carotid pulses. No carotid bruit or JVD.   Cardiovascular:      Rate and Rhythm: Normal rate and regular rhythm.  No extrasystoles are present.     Chest Wall: PMI is not displaced.      Pulses: Normal pulses.      Heart sounds: Normal heart sounds. No murmur heard.  No gallop. No S3 sounds.    Pulmonary:      Effort: No respiratory distress.      Breath sounds: Normal breath sounds. No stridor.   Abdominal:      General: Bowel sounds are normal.      Palpations: Abdomen is soft.      Tenderness: There is no abdominal tenderness. There is no rebound.   Musculoskeletal:         General: Normal range of motion.   Skin:     General: Skin is intact.      Findings: No rash.   Neurological:      Mental Status: He is alert and oriented to person, place, and time.   Psychiatric:         Behavior: Behavior normal.         Significant Labs:   ABG: No results for input(s): PH, PCO2, HCO3, POCSATURATED, BE in the last 48 hours., Blood Culture: No results for input(s): LABBLOO in the last 48 hours., BMP:   Recent Labs   Lab 12/22/21  1445 12/23/21  0503   * 104    142   K 3.6 3.7    110   CO2 23 22*   BUN 28* 25*   CREATININE 2.1* 2.0*   CALCIUM 8.4* 8.1*   , CMP   Recent Labs   Lab 12/22/21  1445 12/23/21  0503    142   K 3.6 3.7    110   CO2 23 22*   * 104   BUN 28* 25*   CREATININE 2.1* 2.0*   CALCIUM 8.4* 8.1*   PROT 6.8  --    ALBUMIN 3.9  --    BILITOT 0.5  --    ALKPHOS 78  --    AST 26  --    ALT 18  --    ANIONGAP 13 10   ESTGFRAFRICA 34* 36*   EGFRNONAA 29* 31*   , CBC   Recent Labs   Lab 12/22/21  1445 12/22/21  1445 12/23/21  0503 12/23/21  0503 12/23/21  1313   WBC 3.75*  --  4.44  --  5.61   HGB 5.8*  --  7.4*  --  8.8*   HCT 20.2*   < > 25.2*   < > 29.1*     --  199  --  200    < > = values in this interval not displayed.   , INR No results for input(s): INR, PROTIME in the last 48 hours., Lipid Panel No results for  input(s): CHOL, HDL, LDLCALC, TRIG, CHOLHDL in the last 48 hours. and Troponin   Recent Labs   Lab 12/22/21  1445 12/22/21  1813 12/23/21  0815   TROPONINI 0.064* 0.087* 0.119*       Significant Imaging: EKG:

## 2021-12-24 NOTE — PROGRESS NOTES
[facility])  Hematology/Oncology  Progress Note    Patient Name: Jake Ortiz  Admission Date: 12/22/2021  Hospital Length of Stay: 0 days  Code Status: Full Code     Subjective:     HPI:  81 y.o male known to Hematology for h/o anemia of CKD and iron deficiency. He is managed with IV iron and Retacrit PRN. Patient had upper and lower endoscopies 9/2020 unrevealing to bleeding source. He has upcoming outpatient VCE scheduled. Patient reported to [facility] ER for evaluation of AMS. Associated symptoms include dizziness, SOB, weakness. He denies noting any abnormal bleeding. In ER H/H 5.8/20.2. saturated iron 3&. Ferritin 15. UA neg. Occult stool neg. Patient s/p 2 units PRBCs with 3rd unit PRBCs currently infusing.       Interval History:  Spoke with patient and wife at bedside.    Oncology Treatment Plan:   [No treatment plan]    Medications:  Continuous Infusions:   sodium chloride 0.9% 75 mL/hr at 12/24/21 0603     Scheduled Meds:   atorvastatin  80 mg Oral Daily    cholecalciferol (vitamin D3)  5,000 Units Oral Daily    epoetin niraj  50 Units/kg Subcutaneous Once    ferrous sulfate  1 tablet Oral Daily    multivitamin  1 tablet Oral Daily    pantoprazole  40 mg Oral Daily    pregabalin  150 mg Oral BID    sodium chloride 0.9%  10 mL Intravenous Q8H    spironolactone  25 mg Oral Daily     PRN Meds:sodium chloride, sodium chloride, acetaminophen, albuterol, clonazePAM, dextrose 50%, dextrose 50%, glucagon (human recombinant), glucose, glucose, hydrALAZINE, insulin aspart U-100, melatonin, naloxone, ondansetron, polyethylene glycol, promethazine, simethicone     Review of Systems   Constitutional: Positive for fatigue. Negative for activity change, appetite change, chills, diaphoresis, fever and unexpected weight change.   HENT: Negative for congestion, dental problem, drooling, ear discharge, ear pain, facial swelling, hearing loss, mouth sores, nosebleeds, postnasal drip, rhinorrhea,  sinus pressure, sneezing, sore throat, tinnitus, trouble swallowing and voice change.    Eyes: Negative for photophobia, pain, discharge, redness, itching and visual disturbance.   Respiratory: Negative for apnea, cough, choking, chest tightness, shortness of breath, wheezing and stridor.    Cardiovascular: Negative for chest pain, palpitations and leg swelling.   Gastrointestinal: Negative for abdominal distention, abdominal pain, anal bleeding, blood in stool, constipation, diarrhea, nausea, rectal pain and vomiting.   Endocrine: Negative for cold intolerance, heat intolerance, polydipsia, polyphagia and polyuria.   Genitourinary: Negative for decreased urine volume, difficulty urinating, dysuria, enuresis, flank pain, frequency, genital sores, hematuria, penile discharge, penile pain, penile swelling, scrotal swelling, testicular pain and urgency.   Musculoskeletal: Negative for arthralgias, back pain, gait problem, joint swelling, myalgias, neck pain and neck stiffness.   Skin: Negative for color change, pallor, rash and wound.   Allergic/Immunologic: Negative for environmental allergies, food allergies and immunocompromised state.   Neurological: Positive for weakness. Negative for dizziness, tremors, seizures, syncope, facial asymmetry, speech difficulty, light-headedness, numbness and headaches.   Hematological: Negative for adenopathy. Does not bruise/bleed easily.   Psychiatric/Behavioral: Positive for dysphoric mood. Negative for agitation, behavioral problems, confusion, decreased concentration, hallucinations, self-injury, sleep disturbance and suicidal ideas. The patient is nervous/anxious. The patient is not hyperactive.      Objective:     Vital Signs (Most Recent):  Temp: 98.6 °F (37 °C) (12/24/21 0738)  Pulse: 60 (12/24/21 0738)  Resp: 18 (12/24/21 0738)  BP: 127/60 (12/24/21 0738)  SpO2: 95 % (12/24/21 0738) Vital Signs (24h Range):  Temp:  [97.7 °F (36.5 °C)-99 °F (37.2 °C)] 98.6 °F (37  °C)  Pulse:  [56-85] 60  Resp:  [15-18] 18  SpO2:  [93 %-98 %] 95 %  BP: (100-145)/(55-80) 127/60     Weight: 101.5 kg (223 lb 12.3 oz)  Body mass index is 33.04 kg/m².  Body surface area is 2.22 meters squared.      Intake/Output Summary (Last 24 hours) at 12/24/2021 1020  Last data filed at 12/24/2021 0603  Gross per 24 hour   Intake 2373.92 ml   Output 1050 ml   Net 1323.92 ml       Physical Exam  Vitals reviewed.   Constitutional:       General: He is not in acute distress.     Appearance: He is well-developed and well-nourished. He is obese. He is not diaphoretic.   HENT:      Head: Normocephalic.      Right Ear: External ear normal.      Left Ear: External ear normal.      Nose: Nose normal.      Right Sinus: No maxillary sinus tenderness or frontal sinus tenderness.      Left Sinus: No maxillary sinus tenderness or frontal sinus tenderness.      Mouth/Throat:      Mouth: Oropharynx is clear and moist.      Pharynx: No oropharyngeal exudate.   Eyes:      General: Lids are normal. No scleral icterus.        Right eye: No discharge.         Left eye: No discharge.      Extraocular Movements: EOM normal.      Right eye: Normal extraocular motion.      Left eye: Normal extraocular motion.      Conjunctiva/sclera:      Right eye: Right conjunctiva is not injected. No hemorrhage.     Left eye: Left conjunctiva is not injected. No hemorrhage.     Pupils: Pupils are equal, round, and reactive to light.   Neck:      Thyroid: No thyromegaly.      Vascular: No JVD.      Trachea: No tracheal deviation.   Cardiovascular:      Rate and Rhythm: Normal rate.   Pulmonary:      Effort: Pulmonary effort is normal. No respiratory distress.      Breath sounds: No stridor.   Chest:   Breasts:      Right: No supraclavicular adenopathy.      Left: No supraclavicular adenopathy.       Abdominal:      General: Bowel sounds are normal.      Palpations: Abdomen is soft. There is no hepatomegaly, splenomegaly, hepatosplenomegaly or mass.       Tenderness: There is no abdominal tenderness.   Musculoskeletal:         General: No tenderness or edema. Normal range of motion.      Cervical back: Normal range of motion and neck supple.   Lymphadenopathy:      Head:      Right side of head: No posterior auricular or occipital adenopathy.      Left side of head: No posterior auricular or occipital adenopathy.      Cervical: No cervical adenopathy.      Right cervical: No superficial, deep or posterior cervical adenopathy.     Left cervical: No superficial, deep or posterior cervical adenopathy.      Upper Body:   No axillary adenopathy present.     Right upper body: No supraclavicular adenopathy.      Left upper body: No supraclavicular adenopathy.   Skin:     General: Skin is dry.      Findings: No erythema or rash.      Nails: There is no clubbing or cyanosis.   Neurological:      Mental Status: He is alert and oriented to person, place, and time.      Cranial Nerves: No cranial nerve deficit.      Coordination: Coordination normal.      Deep Tendon Reflexes: Strength normal.   Psychiatric:         Mood and Affect: Mood and affect normal.         Behavior: Behavior normal.         Thought Content: Thought content normal.         Cognition and Memory: Cognition and memory normal.         Judgment: Judgment normal.         Significant Labs:   BMP:   Recent Labs   Lab 12/22/21  1445 12/23/21  0503   * 104    142   K 3.6 3.7    110   CO2 23 22*   BUN 28* 25*   CREATININE 2.1* 2.0*   CALCIUM 8.4* 8.1*   , CBC:   Recent Labs   Lab 12/22/21  1445 12/23/21  0503 12/23/21  1313   WBC 3.75* 4.44 5.61   HGB 5.8* 7.4* 8.8*   HCT 20.2* 25.2* 29.1*    199 200   , CMP:   Recent Labs   Lab 12/22/21  1445 12/23/21  0503    142   K 3.6 3.7    110   CO2 23 22*   * 104   BUN 28* 25*   CREATININE 2.1* 2.0*   CALCIUM 8.4* 8.1*   PROT 6.8  --    ALBUMIN 3.9  --    BILITOT 0.5  --    ALKPHOS 78  --    AST 26  --    ALT 18  --     ANIONGAP 13 10   EGFRNONAA 29* 31*   , Coagulation: No results for input(s): PT, INR, APTT in the last 48 hours., Haptoglobin: No results for input(s): HAPTOGLOBIN in the last 48 hours., Immunology: No results for input(s): SPEP, PAUL, MARIANO, FREELAMBDALI in the last 48 hours., LDH: No results for input(s): LDHCSF, BFSOURCE in the last 48 hours., LFTs:   Recent Labs   Lab 12/22/21  1445   ALT 18   AST 26   ALKPHOS 78   BILITOT 0.5   PROT 6.8   ALBUMIN 3.9   , Reticulocytes: No results for input(s): RETIC in the last 48 hours., Tumor Markers: No results for input(s): PSA, CEA, , AFPTM, [id number],  in the last 48 hours.    Invalid input(s): ALGTM, Uric Acid No results for input(s): URICACID in the last 48 hours. and Urine Studies:   Recent Labs   Lab 12/22/21  1600   COLORU Yellow   APPEARANCEUA Clear   PHUR 6.0   SPECGRAV 1.020   PROTEINUA Trace*   GLUCUA Negative   KETONESU Negative   BILIRUBINUA Negative   OCCULTUA Negative   NITRITE Negative   UROBILINOGEN Negative   LEUKOCYTESUR Negative       Diagnostic Results:  I have reviewed all pertinent imaging results/findings within the past 24 hours.    Assessment/Plan:     * Symptomatic anemia  --Agree to continue ferrous sulfate 325 mg PO daily  --add venofer 200 mg IV x 1  --GI following. Plan for outpatient VCE  --Maintain hg >8  --plan to arrange outpatient follow up for additional IV iron PRN  12/24/2021.  The patient's hemoglobin has improved with transfusion iron repleted.  At this time out like to in escalate the dose of erythropoietin 72913 units weekly x4 doses.  I have will have her office scheduled for follow-up he will have a CBC done this week will communicate the results through the electronic portal and be seen back in 4 weeks with CBC BMP and iron status prior.  Discussed implications answered questions with patient they preferred Thursday for appointments orders have been placed and updated in the outpatient section for this total time spent 35  minutes    Atrial fibrillation  --Eliquis currently on hold given recent anemia        Thank you for your consult. I will follow-up with patient. Please contact us if you have any additional questions.     Tristin Nath MD  Hematology/Oncology  [facility] - Telemetry ([facility])

## 2021-12-27 ENCOUNTER — PATIENT MESSAGE (OUTPATIENT)
Dept: CARDIOLOGY | Facility: CLINIC | Age: 81
End: 2021-12-27
Payer: MEDICARE

## 2021-12-27 RX ORDER — ATORVASTATIN CALCIUM 80 MG/1
TABLET, FILM COATED ORAL
Qty: 90 TABLET | Refills: 1 | Status: SHIPPED | OUTPATIENT
Start: 2021-12-27 | End: 2022-01-01 | Stop reason: SDUPTHER

## 2021-12-29 ENCOUNTER — HOSPITAL ENCOUNTER (OUTPATIENT)
Facility: HOSPITAL | Age: 81
Discharge: HOME | End: 2021-12-29
Attending: INTERNAL MEDICINE | Admitting: INTERNAL MEDICINE
Payer: MEDICARE

## 2021-12-29 ENCOUNTER — PATIENT OUTREACH (OUTPATIENT)
Dept: ADMINISTRATIVE | Facility: OTHER | Age: 81
End: 2021-12-29
Payer: MEDICARE

## 2021-12-29 PROCEDURE — 91110 GI TRC IMG INTRAL ESOPH-ILE: CPT | Mod: HCNC

## 2021-12-29 NOTE — OR NURSING
Patient arrived for Video Capsule Endoscopy. Two patient identifier verified. Patient verbalized adequate prep. Reviewed the procedure w/ the patient, provided instructions, and answered all questions. Patient verbalized understanding. Dietary restrictions explained. PiilCam swallowed at  0658.  Lot# [id number], Capsule ID# [id number], Exp: 4/4/2023. Pt instructed to return at 1500. PillCam visualized in the pt's stomach prior to discharge. No distress noted.

## 2021-12-29 NOTE — PLAN OF CARE
PillCam swallowed without difficulty. Patient verbalized understanding of all discharge instructions. Discharged from facility without difficulties.

## 2021-12-29 NOTE — DISCHARGE INSTRUCTIONS
Patient Education       Videocapsule Endoscopy   Why is this procedure done?   Doctors may use new technology to look at your digestive system. You may be asked to swallow a special videocapsule. It is the size of a large vitamin. The capsule has a small wireless camera with lights and batteries. It will take pictures of your digestive tract as the camera passes through your small bowel. It is hard to see this area with a traditional endoscopy. The pictures will be sent to a recorder that you wear on your belt. Your doctor may order this if you have:  · Bleeding in your digestive system  · Certain diseases like cancer, Crohn's, ulcerative colitis, or celiac disease  · Polyps  · Other problems and have already had some x-rays done  What will the results be?   Your doctor will find out more information about any problems you may be having with your digestive system.  What happens before the procedure?   · Your doctor will take your history and do an exam. This may include a rectal exam. The doctor may order tests for your stool. Talk to your doctor about:  ? All the drugs you are taking. Be sure to include all prescription and over-the-counter (OTC) drugs, and herbal supplements. Tell the doctor about any drug allergy. Bring a list of drugs you take with you.  ? Any bleeding problems. Be sure to tell your doctor if you are taking any drugs that may cause bleeding. Some of these are warfarin, rivaroxaban, apixaban, ticagrelor, clopidogrel, ketorolac, ibuprofen, naproxen, or aspirin. Certain vitamins and herbs, such as garlic and fish oil, may also add to the risk for bleeding. You may need to stop these drugs as well. Talk to your doctor about them.  ? When you need to stop eating or drinking before your procedure.  · The bowel needs to be cleaned out before this test. Your doctor will tell you to take drugs to help make sure your bowels are empty. These may be liquids, pills, or both. You will take them the night  before the exam.  · You cannot eat or drink for 10 to 12 hours before your test. Length of time depends on what the doctor is evaluating.  · Tell your doctor if you have any problems swallowing.  What happens during the procedure?   · Small sticky patches will be put on your belly. These patches have an antenna with wires that are attached to a recorder. This recorder is worn on a special belt around your waist.  · You will swallow the capsule with a glass of water. The camera takes pictures as it passes through the bowel. These pictures are sent to the recorder through the wires in the patches. The recorder collects and stores all the pictures.  · The capsule leaves the body through a bowel movement. Look for the capsule in your stool. Once this happens, you can remove the patches and return the recorder to your doctor.  · The test may take 8 hours.  What happens after the procedure?   You may go home after you swallow the capsule.  What care is needed at home?   · Ask your doctor what you need to do when you go home. Make sure you ask questions if you do not understand what the doctor says. This way you will know what you need to do.  · After you swallow the capsule, the doctor will tell you when you can drink clear liquids. The doctor or nurse will tell you when you will be able to eat a light meal. Your doctor may have you write down your activity and what time you eat and drink.  · You will be able to take off the sticky pads and recorder when told to by your doctor. Most often, this is after 8 hours. You will give these back to the doctor. You will pass the videocapsule in your stool over the next few days. You do not need to give it back. It can be flushed in the toilet.  · Do not go near an MRI device or have an MRI exam while the capsule is still inside your body.  · Avoid magnetic devices until the capsule is passed in your stool.  · Avoid doing hard activities while wearing the recorder.  What follow-up  care is needed?   · Your doctor may ask you to make visits to the office to check on your progress. Be sure to keep these visits. You will need to talk about the results of the test. You and your doctor can then make a plan for more care.  · Look at your stools for the capsule.  · You may need an x-ray to see if the capsule is still in your body if you do not see it in your stool.  When do I need to call the doctor?   · Fever above 100.4°F (38°C)  · Signs of bowel obstruction. This includes bloating, belly pain, upset stomach, and throwing up. This may mean the capsule is stuck.  · Chest pain  · Trouble swallowing  Last Reviewed Date   2021-10-05  Consumer Information Use and Disclaimer   This information is not specific medical advice and does not replace information you receive from your health care provider. This is only a brief summary of general information. It does NOT include all information about conditions, illnesses, injuries, tests, procedures, treatments, therapies, discharge instructions or life-style choices that may apply to you. You must talk with your health care provider for complete information about your health and treatment options. This information should not be used to decide whether or not to accept your health care providers advice, instructions or recommendations. Only your health care provider has the knowledge and training to provide advice that is right for you.  Copyright   Copyright © 2021 [employer] and its affiliates and/or licensors. All rights reserved.

## 2021-12-30 ENCOUNTER — OUTPATIENT CASE MANAGEMENT (OUTPATIENT)
Dept: ADMINISTRATIVE | Facility: OTHER | Age: 81
End: 2021-12-30
Payer: MEDICARE

## 2021-12-30 ENCOUNTER — OFFICE VISIT (OUTPATIENT)
Dept: CARDIOLOGY | Facility: CLINIC | Age: 81
End: 2021-12-30
Payer: MEDICARE

## 2021-12-30 ENCOUNTER — TELEPHONE (OUTPATIENT)
Dept: PAIN MEDICINE | Facility: CLINIC | Age: 81
End: 2021-12-30
Payer: MEDICARE

## 2021-12-30 ENCOUNTER — LAB VISIT (OUTPATIENT)
Dept: LAB | Facility: HOSPITAL | Age: 81
End: 2021-12-30
Attending: INTERNAL MEDICINE
Payer: MEDICARE

## 2021-12-30 VITALS
WEIGHT: 219.13 LBS | OXYGEN SATURATION: 98 % | HEIGHT: 69 IN | DIASTOLIC BLOOD PRESSURE: 70 MMHG | SYSTOLIC BLOOD PRESSURE: 142 MMHG | HEART RATE: 82 BPM | BODY MASS INDEX: 32.46 KG/M2

## 2021-12-30 DIAGNOSIS — D50.0 IRON DEFICIENCY ANEMIA DUE TO CHRONIC BLOOD LOSS: ICD-10-CM

## 2021-12-30 DIAGNOSIS — R79.89 ELEVATED TROPONIN: ICD-10-CM

## 2021-12-30 DIAGNOSIS — I48.0 PAF (PAROXYSMAL ATRIAL FIBRILLATION): ICD-10-CM

## 2021-12-30 DIAGNOSIS — D64.9 SYMPTOMATIC ANEMIA: Primary | ICD-10-CM

## 2021-12-30 DIAGNOSIS — E66.9 DIABETES MELLITUS TYPE 2 IN OBESE: ICD-10-CM

## 2021-12-30 DIAGNOSIS — R07.9 CHEST PAIN, UNSPECIFIED TYPE: ICD-10-CM

## 2021-12-30 DIAGNOSIS — I50.32 CHRONIC DIASTOLIC CONGESTIVE HEART FAILURE: ICD-10-CM

## 2021-12-30 DIAGNOSIS — I25.2 MI, OLD: ICD-10-CM

## 2021-12-30 DIAGNOSIS — I25.118 CORONARY ARTERY DISEASE OF NATIVE ARTERY OF NATIVE HEART WITH STABLE ANGINA PECTORIS: ICD-10-CM

## 2021-12-30 DIAGNOSIS — E11.69 DIABETES MELLITUS TYPE 2 IN OBESE: ICD-10-CM

## 2021-12-30 DIAGNOSIS — I10 PRIMARY HYPERTENSION: ICD-10-CM

## 2021-12-30 LAB
ANION GAP SERPL CALC-SCNC: 11 MMOL/L (ref 8–16)
BNP SERPL-MCNC: 471 PG/ML (ref 0–99)
BUN SERPL-MCNC: 15 MG/DL (ref 8–23)
CALCIUM SERPL-MCNC: 9.3 MG/DL (ref 8.7–10.5)
CHLORIDE SERPL-SCNC: 110 MMOL/L (ref 95–110)
CO2 SERPL-SCNC: 25 MMOL/L (ref 23–29)
CREAT SERPL-MCNC: 1.6 MG/DL (ref 0.5–1.4)
EST. GFR  (AFRICAN AMERICAN): 47 ML/MIN/1.73 M^2
EST. GFR  (NON AFRICAN AMERICAN): 40 ML/MIN/1.73 M^2
GLUCOSE SERPL-MCNC: 113 MG/DL (ref 70–110)
MAGNESIUM SERPL-MCNC: 2 MG/DL (ref 1.6–2.6)
POTASSIUM SERPL-SCNC: 4.1 MMOL/L (ref 3.5–5.1)
SODIUM SERPL-SCNC: 146 MMOL/L (ref 136–145)

## 2021-12-30 PROCEDURE — 1160F PR REVIEW ALL MEDS BY PRESCRIBER/CLIN PHARMACIST DOCUMENTED: ICD-10-PCS | Mod: HCNC,CPTII,S$GLB, | Performed by: INTERNAL MEDICINE

## 2021-12-30 PROCEDURE — 3077F PR MOST RECENT SYSTOLIC BLOOD PRESSURE >= 140 MM HG: ICD-10-PCS | Mod: HCNC,CPTII,S$GLB, | Performed by: INTERNAL MEDICINE

## 2021-12-30 PROCEDURE — 3078F PR MOST RECENT DIASTOLIC BLOOD PRESSURE < 80 MM HG: ICD-10-PCS | Mod: HCNC,CPTII,S$GLB, | Performed by: INTERNAL MEDICINE

## 2021-12-30 PROCEDURE — 3078F DIAST BP <80 MM HG: CPT | Mod: HCNC,CPTII,S$GLB, | Performed by: INTERNAL MEDICINE

## 2021-12-30 PROCEDURE — 3077F SYST BP >= 140 MM HG: CPT | Mod: HCNC,CPTII,S$GLB, | Performed by: INTERNAL MEDICINE

## 2021-12-30 PROCEDURE — 1160F RVW MEDS BY RX/DR IN RCRD: CPT | Mod: HCNC,CPTII,S$GLB, | Performed by: INTERNAL MEDICINE

## 2021-12-30 PROCEDURE — 99214 OFFICE O/P EST MOD 30 MIN: CPT | Mod: HCNC,S$GLB,, | Performed by: INTERNAL MEDICINE

## 2021-12-30 PROCEDURE — 80048 BASIC METABOLIC PNL TOTAL CA: CPT | Mod: HCNC | Performed by: INTERNAL MEDICINE

## 2021-12-30 PROCEDURE — 36415 COLL VENOUS BLD VENIPUNCTURE: CPT | Mod: HCNC | Performed by: INTERNAL MEDICINE

## 2021-12-30 PROCEDURE — 99214 PR OFFICE/OUTPT VISIT, EST, LEVL IV, 30-39 MIN: ICD-10-PCS | Mod: HCNC,S$GLB,, | Performed by: INTERNAL MEDICINE

## 2021-12-30 PROCEDURE — 99999 PR PBB SHADOW E&M-EST. PATIENT-LVL IV: ICD-10-PCS | Mod: PBBFAC,HCNC,, | Performed by: INTERNAL MEDICINE

## 2021-12-30 PROCEDURE — 83880 ASSAY OF NATRIURETIC PEPTIDE: CPT | Mod: HCNC | Performed by: INTERNAL MEDICINE

## 2021-12-30 PROCEDURE — 1159F MED LIST DOCD IN RCRD: CPT | Mod: HCNC,CPTII,S$GLB, | Performed by: INTERNAL MEDICINE

## 2021-12-30 PROCEDURE — 83735 ASSAY OF MAGNESIUM: CPT | Mod: HCNC | Performed by: INTERNAL MEDICINE

## 2021-12-30 PROCEDURE — 1159F PR MEDICATION LIST DOCUMENTED IN MEDICAL RECORD: ICD-10-PCS | Mod: HCNC,CPTII,S$GLB, | Performed by: INTERNAL MEDICINE

## 2021-12-30 PROCEDURE — 99999 PR PBB SHADOW E&M-EST. PATIENT-LVL IV: CPT | Mod: PBBFAC,HCNC,, | Performed by: INTERNAL MEDICINE

## 2022-01-01 ENCOUNTER — INFUSION (OUTPATIENT)
Dept: INFUSION THERAPY | Facility: HOSPITAL | Age: 82
End: 2022-01-01
Attending: INTERNAL MEDICINE
Payer: MEDICARE

## 2022-01-01 ENCOUNTER — PATIENT MESSAGE (OUTPATIENT)
Dept: GASTROENTEROLOGY | Facility: CLINIC | Age: 82
End: 2022-01-01
Payer: MEDICARE

## 2022-01-01 ENCOUNTER — OFFICE VISIT (OUTPATIENT)
Dept: CARDIOLOGY | Facility: CLINIC | Age: 82
End: 2022-01-01
Payer: MEDICARE

## 2022-01-01 ENCOUNTER — HOSPITAL ENCOUNTER (OUTPATIENT)
Dept: CARDIOLOGY | Facility: HOSPITAL | Age: 82
Discharge: HOME | End: 2022-11-29
Attending: INTERNAL MEDICINE
Payer: MEDICARE

## 2022-01-01 ENCOUNTER — OFFICE VISIT (OUTPATIENT)
Dept: HEMATOLOGY/ONCOLOGY | Facility: CLINIC | Age: 82
End: 2022-01-01
Payer: MEDICARE

## 2022-01-01 ENCOUNTER — OFFICE VISIT (OUTPATIENT)
Dept: GASTROENTEROLOGY | Facility: CLINIC | Age: 82
End: 2022-01-01
Payer: MEDICARE

## 2022-01-01 ENCOUNTER — TELEPHONE (OUTPATIENT)
Dept: PAIN MEDICINE | Facility: CLINIC | Age: 82
End: 2022-01-01
Payer: MEDICARE

## 2022-01-01 ENCOUNTER — TELEPHONE (OUTPATIENT)
Dept: CARDIOLOGY | Facility: CLINIC | Age: 82
End: 2022-01-01
Payer: MEDICARE

## 2022-01-01 ENCOUNTER — LAB VISIT (OUTPATIENT)
Dept: LAB | Facility: HOSPITAL | Age: 82
End: 2022-01-01
Attending: INTERNAL MEDICINE
Payer: MEDICARE

## 2022-01-01 ENCOUNTER — OFFICE VISIT (OUTPATIENT)
Dept: FAMILY MEDICINE | Facility: CLINIC | Age: 82
End: 2022-01-01
Payer: MEDICARE

## 2022-01-01 ENCOUNTER — HOSPITAL ENCOUNTER (OUTPATIENT)
Dept: RADIOLOGY | Facility: HOSPITAL | Age: 82
Discharge: HOME | End: 2022-07-27
Attending: PHYSICIAN ASSISTANT
Payer: MEDICARE

## 2022-01-01 ENCOUNTER — LAB VISIT (OUTPATIENT)
Dept: LAB | Facility: HOSPITAL | Age: 82
End: 2022-01-01
Payer: MEDICARE

## 2022-01-01 ENCOUNTER — PATIENT MESSAGE (OUTPATIENT)
Dept: FAMILY MEDICINE | Facility: CLINIC | Age: 82
End: 2022-01-01
Payer: MEDICARE

## 2022-01-01 ENCOUNTER — OFFICE VISIT (OUTPATIENT)
Dept: SURGERY | Facility: CLINIC | Age: 82
End: 2022-01-01
Payer: MEDICARE

## 2022-01-01 ENCOUNTER — CLINICAL SUPPORT (OUTPATIENT)
Dept: CARDIOLOGY | Facility: HOSPITAL | Age: 82
End: 2022-01-01
Payer: MEDICARE

## 2022-01-01 ENCOUNTER — TELEPHONE (OUTPATIENT)
Dept: ADMINISTRATIVE | Facility: HOSPITAL | Age: 82
End: 2022-01-01
Payer: MEDICARE

## 2022-01-01 ENCOUNTER — INFUSION (OUTPATIENT)
Dept: INFUSION THERAPY | Facility: HOSPITAL | Age: 82
End: 2022-01-01
Attending: NURSE PRACTITIONER
Payer: MEDICARE

## 2022-01-01 ENCOUNTER — TELEPHONE (OUTPATIENT)
Dept: PSYCHIATRY | Facility: CLINIC | Age: 82
End: 2022-01-01
Payer: MEDICARE

## 2022-01-01 ENCOUNTER — DOCUMENTATION ONLY (OUTPATIENT)
Dept: INFUSION THERAPY | Facility: HOSPITAL | Age: 82
End: 2022-01-01
Payer: MEDICARE

## 2022-01-01 ENCOUNTER — HOSPITAL ENCOUNTER (OUTPATIENT)
Dept: CARDIOLOGY | Facility: HOSPITAL | Age: 82
Discharge: HOME | End: 2022-09-27
Attending: INTERNAL MEDICINE
Payer: MEDICARE

## 2022-01-01 ENCOUNTER — TELEPHONE (OUTPATIENT)
Dept: SURGERY | Facility: CLINIC | Age: 82
End: 2022-01-01
Payer: MEDICARE

## 2022-01-01 ENCOUNTER — TELEPHONE (OUTPATIENT)
Dept: GASTROENTEROLOGY | Facility: CLINIC | Age: 82
End: 2022-01-01
Payer: MEDICARE

## 2022-01-01 ENCOUNTER — LAB VISIT (OUTPATIENT)
Dept: LAB | Facility: HOSPITAL | Age: 82
End: 2022-01-01
Attending: NURSE PRACTITIONER
Payer: MEDICARE

## 2022-01-01 ENCOUNTER — PATIENT MESSAGE (OUTPATIENT)
Dept: CARDIOLOGY | Facility: CLINIC | Age: 82
End: 2022-01-01
Payer: MEDICARE

## 2022-01-01 ENCOUNTER — PATIENT MESSAGE (OUTPATIENT)
Dept: HEMATOLOGY/ONCOLOGY | Facility: CLINIC | Age: 82
End: 2022-01-01
Payer: MEDICARE

## 2022-01-01 ENCOUNTER — PATIENT MESSAGE (OUTPATIENT)
Dept: PAIN MEDICINE | Facility: CLINIC | Age: 82
End: 2022-01-01

## 2022-01-01 ENCOUNTER — OFFICE VISIT (OUTPATIENT)
Dept: PAIN MEDICINE | Facility: CLINIC | Age: 82
End: 2022-01-01
Payer: MEDICARE

## 2022-01-01 ENCOUNTER — TELEPHONE (OUTPATIENT)
Dept: PAIN MEDICINE | Facility: CLINIC | Age: 82
End: 2022-01-01

## 2022-01-01 ENCOUNTER — PATIENT MESSAGE (OUTPATIENT)
Dept: PSYCHIATRY | Facility: CLINIC | Age: 82
End: 2022-01-01
Payer: MEDICARE

## 2022-01-01 ENCOUNTER — PATIENT MESSAGE (OUTPATIENT)
Dept: SURGERY | Facility: CLINIC | Age: 82
End: 2022-01-01
Payer: MEDICARE

## 2022-01-01 ENCOUNTER — PATIENT MESSAGE (OUTPATIENT)
Dept: HEMATOLOGY/ONCOLOGY | Facility: HOSPITAL | Age: 82
End: 2022-01-01
Payer: MEDICARE

## 2022-01-01 ENCOUNTER — INFUSION (OUTPATIENT)
Dept: INFUSION THERAPY | Facility: HOSPITAL | Age: 82
End: 2022-01-01
Payer: MEDICARE

## 2022-01-01 ENCOUNTER — PATIENT MESSAGE (OUTPATIENT)
Dept: INFUSION THERAPY | Facility: HOSPITAL | Age: 82
End: 2022-01-01
Payer: MEDICARE

## 2022-01-01 ENCOUNTER — PATIENT MESSAGE (OUTPATIENT)
Dept: PAIN MEDICINE | Facility: CLINIC | Age: 82
End: 2022-01-01
Payer: MEDICARE

## 2022-01-01 ENCOUNTER — TELEPHONE (OUTPATIENT)
Dept: ADMINISTRATIVE | Facility: CLINIC | Age: 82
End: 2022-01-01
Payer: MEDICARE

## 2022-01-01 ENCOUNTER — LAB VISIT (OUTPATIENT)
Dept: LAB | Facility: HOSPITAL | Age: 82
End: 2022-01-01
Attending: FAMILY MEDICINE
Payer: MEDICARE

## 2022-01-01 ENCOUNTER — OFFICE VISIT (OUTPATIENT)
Dept: PSYCHIATRY | Facility: CLINIC | Age: 82
End: 2022-01-01
Payer: MEDICARE

## 2022-01-01 ENCOUNTER — HOSPITAL ENCOUNTER (OUTPATIENT)
Dept: CARDIOLOGY | Facility: HOSPITAL | Age: 82
Discharge: HOME | End: 2022-10-06
Attending: INTERNAL MEDICINE
Payer: MEDICARE

## 2022-01-01 VITALS
TEMPERATURE: 97 F | DIASTOLIC BLOOD PRESSURE: 63 MMHG | RESPIRATION RATE: 18 BRPM | OXYGEN SATURATION: 99 % | SYSTOLIC BLOOD PRESSURE: 105 MMHG | HEART RATE: 89 BPM

## 2022-01-01 VITALS
OXYGEN SATURATION: 98 % | TEMPERATURE: 98 F | DIASTOLIC BLOOD PRESSURE: 62 MMHG | SYSTOLIC BLOOD PRESSURE: 128 MMHG | HEART RATE: 78 BPM | BODY MASS INDEX: 26.8 KG/M2 | HEIGHT: 70 IN | WEIGHT: 187.19 LBS

## 2022-01-01 VITALS
HEART RATE: 64 BPM | RESPIRATION RATE: 18 BRPM | OXYGEN SATURATION: 99 % | TEMPERATURE: 97 F | OXYGEN SATURATION: 100 % | SYSTOLIC BLOOD PRESSURE: 121 MMHG | HEART RATE: 96 BPM | HEIGHT: 70 IN | SYSTOLIC BLOOD PRESSURE: 95 MMHG | TEMPERATURE: 97 F | BODY MASS INDEX: 27.94 KG/M2 | DIASTOLIC BLOOD PRESSURE: 56 MMHG | RESPIRATION RATE: 18 BRPM | DIASTOLIC BLOOD PRESSURE: 56 MMHG | SYSTOLIC BLOOD PRESSURE: 114 MMHG | WEIGHT: 195.13 LBS | HEART RATE: 87 BPM | OXYGEN SATURATION: 99 % | DIASTOLIC BLOOD PRESSURE: 52 MMHG

## 2022-01-01 VITALS
SYSTOLIC BLOOD PRESSURE: 130 MMHG | WEIGHT: 188.81 LBS | DIASTOLIC BLOOD PRESSURE: 66 MMHG | HEIGHT: 69 IN | BODY MASS INDEX: 28.15 KG/M2 | HEIGHT: 69 IN | WEIGHT: 189.38 LBS | HEART RATE: 91 BPM | WEIGHT: 190.06 LBS | OXYGEN SATURATION: 100 % | HEIGHT: 69 IN | BODY MASS INDEX: 28.05 KG/M2 | HEART RATE: 78 BPM | DIASTOLIC BLOOD PRESSURE: 66 MMHG | SYSTOLIC BLOOD PRESSURE: 110 MMHG | BODY MASS INDEX: 27.96 KG/M2 | SYSTOLIC BLOOD PRESSURE: 126 MMHG | OXYGEN SATURATION: 99 % | HEART RATE: 87 BPM | DIASTOLIC BLOOD PRESSURE: 60 MMHG | OXYGEN SATURATION: 98 %

## 2022-01-01 VITALS
RESPIRATION RATE: 17 BRPM | SYSTOLIC BLOOD PRESSURE: 120 MMHG | BODY MASS INDEX: 27.95 KG/M2 | BODY MASS INDEX: 26.68 KG/M2 | HEIGHT: 70 IN | HEIGHT: 69 IN | DIASTOLIC BLOOD PRESSURE: 62 MMHG | SYSTOLIC BLOOD PRESSURE: 109 MMHG | HEART RATE: 79 BPM | OXYGEN SATURATION: 99 % | HEART RATE: 70 BPM | WEIGHT: 188.69 LBS | DIASTOLIC BLOOD PRESSURE: 52 MMHG | WEIGHT: 186.38 LBS

## 2022-01-01 VITALS
HEART RATE: 88 BPM | DIASTOLIC BLOOD PRESSURE: 65 MMHG | TEMPERATURE: 97 F | SYSTOLIC BLOOD PRESSURE: 117 MMHG | OXYGEN SATURATION: 98 % | RESPIRATION RATE: 18 BRPM

## 2022-01-01 VITALS
RESPIRATION RATE: 18 BRPM | DIASTOLIC BLOOD PRESSURE: 48 MMHG | HEIGHT: 70 IN | WEIGHT: 191.56 LBS | OXYGEN SATURATION: 100 % | BODY MASS INDEX: 27.42 KG/M2 | TEMPERATURE: 98 F | HEART RATE: 79 BPM | SYSTOLIC BLOOD PRESSURE: 85 MMHG

## 2022-01-01 VITALS
HEART RATE: 83 BPM | OXYGEN SATURATION: 98 % | WEIGHT: 184.94 LBS | DIASTOLIC BLOOD PRESSURE: 58 MMHG | BODY MASS INDEX: 26.48 KG/M2 | HEIGHT: 70 IN | SYSTOLIC BLOOD PRESSURE: 110 MMHG

## 2022-01-01 VITALS
RESPIRATION RATE: 16 BRPM | TEMPERATURE: 97 F | RESPIRATION RATE: 18 BRPM | HEART RATE: 80 BPM | DIASTOLIC BLOOD PRESSURE: 60 MMHG | OXYGEN SATURATION: 98 % | HEIGHT: 70 IN | OXYGEN SATURATION: 99 % | BODY MASS INDEX: 27.15 KG/M2 | TEMPERATURE: 97 F | DIASTOLIC BLOOD PRESSURE: 60 MMHG | SYSTOLIC BLOOD PRESSURE: 107 MMHG | SYSTOLIC BLOOD PRESSURE: 114 MMHG | DIASTOLIC BLOOD PRESSURE: 63 MMHG | HEART RATE: 71 BPM | OXYGEN SATURATION: 100 % | WEIGHT: 189.63 LBS | TEMPERATURE: 97 F | SYSTOLIC BLOOD PRESSURE: 109 MMHG | HEART RATE: 87 BPM

## 2022-01-01 VITALS
RESPIRATION RATE: 18 BRPM | TEMPERATURE: 97 F | HEART RATE: 62 BPM | OXYGEN SATURATION: 99 % | SYSTOLIC BLOOD PRESSURE: 105 MMHG | DIASTOLIC BLOOD PRESSURE: 58 MMHG

## 2022-01-01 VITALS
TEMPERATURE: 97 F | HEIGHT: 69 IN | SYSTOLIC BLOOD PRESSURE: 125 MMHG | DIASTOLIC BLOOD PRESSURE: 73 MMHG | HEART RATE: 78 BPM | WEIGHT: 190.94 LBS | BODY MASS INDEX: 28.28 KG/M2

## 2022-01-01 VITALS
HEART RATE: 77 BPM | OXYGEN SATURATION: 99 % | SYSTOLIC BLOOD PRESSURE: 118 MMHG | OXYGEN SATURATION: 100 % | SYSTOLIC BLOOD PRESSURE: 106 MMHG | TEMPERATURE: 97 F | HEART RATE: 77 BPM | DIASTOLIC BLOOD PRESSURE: 60 MMHG | DIASTOLIC BLOOD PRESSURE: 58 MMHG | RESPIRATION RATE: 18 BRPM | HEART RATE: 86 BPM | RESPIRATION RATE: 16 BRPM | BODY MASS INDEX: 28.08 KG/M2 | TEMPERATURE: 98 F | DIASTOLIC BLOOD PRESSURE: 58 MMHG | HEIGHT: 69 IN | OXYGEN SATURATION: 97 % | WEIGHT: 189.63 LBS | SYSTOLIC BLOOD PRESSURE: 134 MMHG

## 2022-01-01 VITALS
SYSTOLIC BLOOD PRESSURE: 124 MMHG | HEART RATE: 66 BPM | HEIGHT: 69 IN | BODY MASS INDEX: 28.47 KG/M2 | WEIGHT: 192.25 LBS | DIASTOLIC BLOOD PRESSURE: 64 MMHG

## 2022-01-01 VITALS
HEART RATE: 63 BPM | SYSTOLIC BLOOD PRESSURE: 103 MMHG | RESPIRATION RATE: 16 BRPM | OXYGEN SATURATION: 99 % | TEMPERATURE: 98 F | DIASTOLIC BLOOD PRESSURE: 51 MMHG

## 2022-01-01 VITALS
OXYGEN SATURATION: 100 % | DIASTOLIC BLOOD PRESSURE: 64 MMHG | HEART RATE: 64 BPM | SYSTOLIC BLOOD PRESSURE: 138 MMHG | RESPIRATION RATE: 18 BRPM | TEMPERATURE: 97 F

## 2022-01-01 VITALS
SYSTOLIC BLOOD PRESSURE: 122 MMHG | DIASTOLIC BLOOD PRESSURE: 65 MMHG | TEMPERATURE: 97 F | RESPIRATION RATE: 18 BRPM | HEART RATE: 92 BPM | OXYGEN SATURATION: 99 %

## 2022-01-01 VITALS
TEMPERATURE: 98 F | SYSTOLIC BLOOD PRESSURE: 130 MMHG | OXYGEN SATURATION: 96 % | HEART RATE: 81 BPM | BODY MASS INDEX: 27.98 KG/M2 | DIASTOLIC BLOOD PRESSURE: 62 MMHG | HEIGHT: 69 IN | WEIGHT: 188.94 LBS

## 2022-01-01 VITALS
DIASTOLIC BLOOD PRESSURE: 54 MMHG | OXYGEN SATURATION: 98 % | SYSTOLIC BLOOD PRESSURE: 102 MMHG | BODY MASS INDEX: 27.9 KG/M2 | OXYGEN SATURATION: 99 % | TEMPERATURE: 97 F | HEIGHT: 70 IN | RESPIRATION RATE: 18 BRPM | DIASTOLIC BLOOD PRESSURE: 50 MMHG | TEMPERATURE: 97 F | HEART RATE: 76 BPM | HEART RATE: 76 BPM | SYSTOLIC BLOOD PRESSURE: 94 MMHG | WEIGHT: 194.88 LBS

## 2022-01-01 VITALS
SYSTOLIC BLOOD PRESSURE: 141 MMHG | RESPIRATION RATE: 18 BRPM | SYSTOLIC BLOOD PRESSURE: 95 MMHG | OXYGEN SATURATION: 100 % | HEART RATE: 92 BPM | BODY MASS INDEX: 28.01 KG/M2 | DIASTOLIC BLOOD PRESSURE: 53 MMHG | TEMPERATURE: 98 F | DIASTOLIC BLOOD PRESSURE: 63 MMHG | WEIGHT: 192.38 LBS | HEART RATE: 84 BPM

## 2022-01-01 VITALS
WEIGHT: 191.56 LBS | HEART RATE: 74 BPM | TEMPERATURE: 97 F | DIASTOLIC BLOOD PRESSURE: 60 MMHG | SYSTOLIC BLOOD PRESSURE: 113 MMHG | BODY MASS INDEX: 27.49 KG/M2

## 2022-01-01 VITALS
HEART RATE: 87 BPM | TEMPERATURE: 98 F | RESPIRATION RATE: 18 BRPM | OXYGEN SATURATION: 98 % | DIASTOLIC BLOOD PRESSURE: 57 MMHG | SYSTOLIC BLOOD PRESSURE: 101 MMHG

## 2022-01-01 VITALS
OXYGEN SATURATION: 98 % | DIASTOLIC BLOOD PRESSURE: 65 MMHG | TEMPERATURE: 98 F | BODY MASS INDEX: 28.77 KG/M2 | SYSTOLIC BLOOD PRESSURE: 118 MMHG | HEIGHT: 69 IN | HEART RATE: 81 BPM | WEIGHT: 194.25 LBS

## 2022-01-01 DIAGNOSIS — D63.1 ANEMIA ASSOCIATED WITH STAGE 4 CHRONIC RENAL FAILURE: Primary | ICD-10-CM

## 2022-01-01 DIAGNOSIS — Z95.0 PRESENCE OF CARDIAC RESYNCHRONIZATION THERAPY PACEMAKER (CRT-P): ICD-10-CM

## 2022-01-01 DIAGNOSIS — N18.4 ANEMIA ASSOCIATED WITH STAGE 4 CHRONIC RENAL FAILURE: ICD-10-CM

## 2022-01-01 DIAGNOSIS — D63.1 ANEMIA ASSOCIATED WITH STAGE 4 CHRONIC RENAL FAILURE: ICD-10-CM

## 2022-01-01 DIAGNOSIS — I25.118 CORONARY ARTERY DISEASE OF NATIVE ARTERY OF NATIVE HEART WITH STABLE ANGINA PECTORIS: ICD-10-CM

## 2022-01-01 DIAGNOSIS — G47.33 OBSTRUCTIVE SLEEP APNEA: ICD-10-CM

## 2022-01-01 DIAGNOSIS — D50.0 IRON DEFICIENCY ANEMIA DUE TO CHRONIC BLOOD LOSS: Primary | ICD-10-CM

## 2022-01-01 DIAGNOSIS — G47.61 PERIODIC LIMB MOVEMENT DISORDER (PLMD): ICD-10-CM

## 2022-01-01 DIAGNOSIS — I10 PRIMARY HYPERTENSION: ICD-10-CM

## 2022-01-01 DIAGNOSIS — M51.36 DDD (DEGENERATIVE DISC DISEASE), LUMBAR: ICD-10-CM

## 2022-01-01 DIAGNOSIS — D63.1 ANEMIA IN STAGE 4 CHRONIC KIDNEY DISEASE: ICD-10-CM

## 2022-01-01 DIAGNOSIS — D64.9 SEVERE ANEMIA: Primary | ICD-10-CM

## 2022-01-01 DIAGNOSIS — I48.0 PAROXYSMAL ATRIAL FIBRILLATION: ICD-10-CM

## 2022-01-01 DIAGNOSIS — M47.816 SPONDYLOSIS WITHOUT MYELOPATHY OR RADICULOPATHY, LUMBAR REGION: ICD-10-CM

## 2022-01-01 DIAGNOSIS — D50.0 IRON DEFICIENCY ANEMIA DUE TO CHRONIC BLOOD LOSS: ICD-10-CM

## 2022-01-01 DIAGNOSIS — D64.9 SYMPTOMATIC ANEMIA: Primary | ICD-10-CM

## 2022-01-01 DIAGNOSIS — M48.062 SPINAL STENOSIS OF LUMBAR REGION WITH NEUROGENIC CLAUDICATION: ICD-10-CM

## 2022-01-01 DIAGNOSIS — M54.16 LUMBAR RADICULOPATHY: ICD-10-CM

## 2022-01-01 DIAGNOSIS — D63.1 ANEMIA IN STAGE 4 CHRONIC KIDNEY DISEASE: Primary | ICD-10-CM

## 2022-01-01 DIAGNOSIS — E78.2 MIXED HYPERLIPIDEMIA: ICD-10-CM

## 2022-01-01 DIAGNOSIS — N18.4 ANEMIA ASSOCIATED WITH STAGE 4 CHRONIC RENAL FAILURE: Primary | ICD-10-CM

## 2022-01-01 DIAGNOSIS — J43.9 MIXED RESTRICTIVE AND OBSTRUCTIVE LUNG DISEASE: Chronic | ICD-10-CM

## 2022-01-01 DIAGNOSIS — N18.4 ANEMIA IN STAGE 4 CHRONIC KIDNEY DISEASE: ICD-10-CM

## 2022-01-01 DIAGNOSIS — J43.9 MIXED RESTRICTIVE AND OBSTRUCTIVE LUNG DISEASE: ICD-10-CM

## 2022-01-01 DIAGNOSIS — I48.21 PERMANENT ATRIAL FIBRILLATION: ICD-10-CM

## 2022-01-01 DIAGNOSIS — N18.4 STAGE 4 CHRONIC KIDNEY DISEASE: ICD-10-CM

## 2022-01-01 DIAGNOSIS — D64.9 ANEMIA, UNSPECIFIED TYPE: Primary | ICD-10-CM

## 2022-01-01 DIAGNOSIS — N17.9 AKI (ACUTE KIDNEY INJURY): ICD-10-CM

## 2022-01-01 DIAGNOSIS — Z95.0 CARDIAC PACEMAKER IN SITU: ICD-10-CM

## 2022-01-01 DIAGNOSIS — I48.11 LONGSTANDING PERSISTENT ATRIAL FIBRILLATION: ICD-10-CM

## 2022-01-01 DIAGNOSIS — D53.9 NUTRITIONAL ANEMIA, UNSPECIFIED: ICD-10-CM

## 2022-01-01 DIAGNOSIS — I50.42 CHRONIC COMBINED SYSTOLIC AND DIASTOLIC CONGESTIVE HEART FAILURE: ICD-10-CM

## 2022-01-01 DIAGNOSIS — D64.9 ANEMIA, UNSPECIFIED TYPE: ICD-10-CM

## 2022-01-01 DIAGNOSIS — N18.4 ANEMIA IN STAGE 4 CHRONIC KIDNEY DISEASE: Primary | ICD-10-CM

## 2022-01-01 DIAGNOSIS — R15.2 FECAL URGENCY: ICD-10-CM

## 2022-01-01 DIAGNOSIS — I70.0 ARTERIOSCLEROSIS OF AORTA: ICD-10-CM

## 2022-01-01 DIAGNOSIS — I50.42 CHRONIC COMBINED SYSTOLIC AND DIASTOLIC CONGESTIVE HEART FAILURE: Primary | ICD-10-CM

## 2022-01-01 DIAGNOSIS — M47.816 LUMBAR SPONDYLOSIS: ICD-10-CM

## 2022-01-01 DIAGNOSIS — R19.7 DIARRHEA IN ADULT PATIENT: Primary | ICD-10-CM

## 2022-01-01 DIAGNOSIS — E83.42 HYPOMAGNESEMIA: ICD-10-CM

## 2022-01-01 DIAGNOSIS — E11.9 TYPE 2 DIABETES MELLITUS WITHOUT COMPLICATION, WITHOUT LONG-TERM CURRENT USE OF INSULIN: ICD-10-CM

## 2022-01-01 DIAGNOSIS — K62.5 RECTAL BLEED: ICD-10-CM

## 2022-01-01 DIAGNOSIS — G89.4 CHRONIC PAIN SYNDROME: ICD-10-CM

## 2022-01-01 DIAGNOSIS — Z95.0 PRESENCE OF CARDIAC PACEMAKER: Primary | ICD-10-CM

## 2022-01-01 DIAGNOSIS — C61 ADENOCARCINOMA OF PROSTATE: ICD-10-CM

## 2022-01-01 DIAGNOSIS — M96.1 FAILED BACK SURGICAL SYNDROME: Primary | ICD-10-CM

## 2022-01-01 DIAGNOSIS — R76.8 RHEUMATOID FACTOR POSITIVE: ICD-10-CM

## 2022-01-01 DIAGNOSIS — N25.81 SECONDARY HYPERPARATHYROIDISM OF RENAL ORIGIN: ICD-10-CM

## 2022-01-01 DIAGNOSIS — E11.69 DIABETES MELLITUS TYPE 2 IN OBESE: ICD-10-CM

## 2022-01-01 DIAGNOSIS — J98.4 MIXED RESTRICTIVE AND OBSTRUCTIVE LUNG DISEASE: ICD-10-CM

## 2022-01-01 DIAGNOSIS — K64.8 INTERNAL HEMORRHOIDS: ICD-10-CM

## 2022-01-01 DIAGNOSIS — E78.2 MIXED HYPERLIPIDEMIA: Primary | ICD-10-CM

## 2022-01-01 DIAGNOSIS — Z95.1 S/P CABG X 3: ICD-10-CM

## 2022-01-01 DIAGNOSIS — D64.9 SYMPTOMATIC ANEMIA: ICD-10-CM

## 2022-01-01 DIAGNOSIS — R79.89 ELEVATED TROPONIN: ICD-10-CM

## 2022-01-01 DIAGNOSIS — R19.5 POSITIVE OCCULT STOOL BLOOD TEST: ICD-10-CM

## 2022-01-01 DIAGNOSIS — Z95.0 PRESENCE OF CARDIAC RESYNCHRONIZATION THERAPY PACEMAKER (CRT-P): Primary | ICD-10-CM

## 2022-01-01 DIAGNOSIS — Z95.0 PRESENCE OF CARDIAC PACEMAKER: ICD-10-CM

## 2022-01-01 DIAGNOSIS — I48.0 PAF (PAROXYSMAL ATRIAL FIBRILLATION): ICD-10-CM

## 2022-01-01 DIAGNOSIS — Z00.8 ENCOUNTER FOR PSYCHOLOGICAL EVALUATION: Primary | ICD-10-CM

## 2022-01-01 DIAGNOSIS — M54.16 LUMBAR RADICULOPATHY, CHRONIC: ICD-10-CM

## 2022-01-01 DIAGNOSIS — E66.9 DIABETES MELLITUS TYPE 2 IN OBESE: ICD-10-CM

## 2022-01-01 DIAGNOSIS — K21.9 GASTROESOPHAGEAL REFLUX DISEASE WITHOUT ESOPHAGITIS: ICD-10-CM

## 2022-01-01 DIAGNOSIS — J98.4 MIXED RESTRICTIVE AND OBSTRUCTIVE LUNG DISEASE: Chronic | ICD-10-CM

## 2022-01-01 DIAGNOSIS — K64.8 INTERNAL HEMORRHOID: Primary | ICD-10-CM

## 2022-01-01 DIAGNOSIS — I10 ESSENTIAL HYPERTENSION: ICD-10-CM

## 2022-01-01 DIAGNOSIS — Z45.018 BIVENTRICULAR PACEMAKER CHECK: ICD-10-CM

## 2022-01-01 DIAGNOSIS — D71 GRANULOMATOUS DISEASE: ICD-10-CM

## 2022-01-01 DIAGNOSIS — M54.9 DORSALGIA, UNSPECIFIED: ICD-10-CM

## 2022-01-01 DIAGNOSIS — I25.118 CORONARY ARTERY DISEASE OF NATIVE ARTERY OF NATIVE HEART WITH STABLE ANGINA PECTORIS: Primary | ICD-10-CM

## 2022-01-01 DIAGNOSIS — J98.4 CHRONIC RESTRICTIVE LUNG DISEASE: ICD-10-CM

## 2022-01-01 DIAGNOSIS — R71.8 ELEVATED MCV: ICD-10-CM

## 2022-01-01 DIAGNOSIS — E11.9 TYPE 2 DIABETES MELLITUS WITHOUT COMPLICATION, WITHOUT LONG-TERM CURRENT USE OF INSULIN: Primary | ICD-10-CM

## 2022-01-01 DIAGNOSIS — K52.1 DIARRHEA DUE TO DRUG: Primary | ICD-10-CM

## 2022-01-01 DIAGNOSIS — Z95.0 CARDIAC PACEMAKER IN SITU: Primary | ICD-10-CM

## 2022-01-01 DIAGNOSIS — M96.1 FAILED BACK SURGICAL SYNDROME: ICD-10-CM

## 2022-01-01 DIAGNOSIS — I51.7 CARDIOMEGALY: ICD-10-CM

## 2022-01-01 LAB
ALBUMIN SERPL BCP-MCNC: 4 G/DL (ref 3.5–5.2)
ALBUMIN SERPL BCP-MCNC: 4 G/DL (ref 3.5–5.2)
ALBUMIN SERPL BCP-MCNC: 4.1 G/DL (ref 3.5–5.2)
ALBUMIN SERPL BCP-MCNC: 4.2 G/DL (ref 3.5–5.2)
ALP SERPL-CCNC: 67 U/L (ref 55–135)
ALP SERPL-CCNC: 73 U/L (ref 55–135)
ALP SERPL-CCNC: 76 U/L (ref 55–135)
ALP SERPL-CCNC: 77 U/L (ref 55–135)
ALT SERPL W/O P-5'-P-CCNC: 12 U/L (ref 10–44)
ALT SERPL W/O P-5'-P-CCNC: 15 U/L (ref 10–44)
ALT SERPL W/O P-5'-P-CCNC: 7 U/L (ref 10–44)
ALT SERPL W/O P-5'-P-CCNC: 7 U/L (ref 10–44)
ANION GAP SERPL CALC-SCNC: 10 MMOL/L (ref 8–16)
ANION GAP SERPL CALC-SCNC: 12 MMOL/L (ref 8–16)
ANION GAP SERPL CALC-SCNC: 12 MMOL/L (ref 8–16)
ANION GAP SERPL CALC-SCNC: 9 MMOL/L (ref 8–16)
AST SERPL-CCNC: 15 U/L (ref 10–40)
AST SERPL-CCNC: 15 U/L (ref 10–40)
AST SERPL-CCNC: 21 U/L (ref 10–40)
AST SERPL-CCNC: 24 U/L (ref 10–40)
BASOPHILS # BLD AUTO: 0.03 K/UL (ref 0–0.2)
BASOPHILS # BLD AUTO: 0.03 K/UL (ref 0–0.2)
BASOPHILS # BLD AUTO: 0.05 K/UL (ref 0–0.2)
BASOPHILS # BLD AUTO: 0.05 K/UL (ref 0–0.2)
BASOPHILS # BLD AUTO: 0.06 K/UL (ref 0–0.2)
BASOPHILS # BLD AUTO: 0.07 K/UL (ref 0–0.2)
BASOPHILS # BLD AUTO: 0.08 K/UL (ref 0–0.2)
BASOPHILS NFR BLD: 0.6 % (ref 0–1.9)
BASOPHILS NFR BLD: 0.8 % (ref 0–1.9)
BASOPHILS NFR BLD: 1 % (ref 0–1.9)
BASOPHILS NFR BLD: 1.1 % (ref 0–1.9)
BASOPHILS NFR BLD: 1.5 % (ref 0–1.9)
BASOPHILS NFR BLD: 1.6 % (ref 0–1.9)
BASOPHILS NFR BLD: 1.6 % (ref 0–1.9)
BILIRUB SERPL-MCNC: 0.4 MG/DL (ref 0.1–1)
BUN SERPL-MCNC: 29 MG/DL (ref 8–23)
BUN SERPL-MCNC: 30 MG/DL (ref 8–23)
BUN SERPL-MCNC: 32 MG/DL (ref 8–23)
BUN SERPL-MCNC: 32 MG/DL (ref 8–23)
CALCIUM SERPL-MCNC: 8.5 MG/DL (ref 8.7–10.5)
CALCIUM SERPL-MCNC: 9.3 MG/DL (ref 8.7–10.5)
CALCIUM SERPL-MCNC: 9.4 MG/DL (ref 8.7–10.5)
CALCIUM SERPL-MCNC: 9.6 MG/DL (ref 8.7–10.5)
CHLORIDE SERPL-SCNC: 105 MMOL/L (ref 95–110)
CHLORIDE SERPL-SCNC: 107 MMOL/L (ref 95–110)
CHLORIDE SERPL-SCNC: 108 MMOL/L (ref 95–110)
CHLORIDE SERPL-SCNC: 111 MMOL/L (ref 95–110)
CHOLEST SERPL-MCNC: 124 MG/DL (ref 120–199)
CHOLEST/HDLC SERPL: 2.9 {RATIO} (ref 2–5)
CO2 SERPL-SCNC: 18 MMOL/L (ref 23–29)
CO2 SERPL-SCNC: 21 MMOL/L (ref 23–29)
CO2 SERPL-SCNC: 21 MMOL/L (ref 23–29)
CO2 SERPL-SCNC: 22 MMOL/L (ref 23–29)
CREAT SERPL-MCNC: 1.5 MG/DL (ref 0.5–1.4)
CREAT SERPL-MCNC: 1.7 MG/DL (ref 0.5–1.4)
CREAT SERPL-MCNC: 1.8 MG/DL (ref 0.5–1.4)
CREAT SERPL-MCNC: 1.9 MG/DL (ref 0.5–1.4)
DIFFERENTIAL METHOD: ABNORMAL
EOSINOPHIL # BLD AUTO: 0.1 K/UL (ref 0–0.5)
EOSINOPHIL # BLD AUTO: 0.2 K/UL (ref 0–0.5)
EOSINOPHIL NFR BLD: 2.2 % (ref 0–8)
EOSINOPHIL NFR BLD: 2.3 % (ref 0–8)
EOSINOPHIL NFR BLD: 3.1 % (ref 0–8)
EOSINOPHIL NFR BLD: 3.3 % (ref 0–8)
EOSINOPHIL NFR BLD: 3.8 % (ref 0–8)
EOSINOPHIL NFR BLD: 3.8 % (ref 0–8)
EOSINOPHIL NFR BLD: 4.3 % (ref 0–8)
ERYTHROCYTE [DISTWIDTH] IN BLOOD BY AUTOMATED COUNT: 16.8 % (ref 11.5–14.5)
ERYTHROCYTE [DISTWIDTH] IN BLOOD BY AUTOMATED COUNT: 17.4 % (ref 11.5–14.5)
ERYTHROCYTE [DISTWIDTH] IN BLOOD BY AUTOMATED COUNT: 17.9 % (ref 11.5–14.5)
ERYTHROCYTE [DISTWIDTH] IN BLOOD BY AUTOMATED COUNT: 18.6 % (ref 11.5–14.5)
ERYTHROCYTE [DISTWIDTH] IN BLOOD BY AUTOMATED COUNT: 23.3 % (ref 11.5–14.5)
ERYTHROCYTE [DISTWIDTH] IN BLOOD BY AUTOMATED COUNT: 25.5 % (ref 11.5–14.5)
ERYTHROCYTE [DISTWIDTH] IN BLOOD BY AUTOMATED COUNT: 28.4 % (ref 11.5–14.5)
EST. GFR  (AFRICAN AMERICAN): 50 ML/MIN/1.73 M^2
EST. GFR  (NO RACE VARIABLE): 35.2 ML/MIN/1.73 M^2
EST. GFR  (NO RACE VARIABLE): 38 ML/MIN/1.73 M^2
EST. GFR  (NO RACE VARIABLE): 40 ML/MIN/1.73 M^2
EST. GFR  (NON AFRICAN AMERICAN): 43 ML/MIN/1.73 M^2
ESTIMATED AVG GLUCOSE: 94 MG/DL (ref 68–131)
FERRITIN SERPL-MCNC: 160 NG/ML (ref 20–300)
GLUCOSE SERPL-MCNC: 101 MG/DL (ref 70–110)
GLUCOSE SERPL-MCNC: 123 MG/DL (ref 70–110)
GLUCOSE SERPL-MCNC: 131 MG/DL (ref 70–110)
GLUCOSE SERPL-MCNC: 90 MG/DL (ref 70–110)
HBA1C MFR BLD: 4.9 % (ref 4–5.6)
HCT VFR BLD AUTO: 21.4 % (ref 40–54)
HCT VFR BLD AUTO: 27.6 % (ref 40–54)
HCT VFR BLD AUTO: 30.1 % (ref 40–54)
HCT VFR BLD AUTO: 33.2 % (ref 40–54)
HCT VFR BLD AUTO: 35.3 % (ref 40–54)
HCT VFR BLD AUTO: 37.6 % (ref 40–54)
HCT VFR BLD AUTO: 41.5 % (ref 40–54)
HDLC SERPL-MCNC: 43 MG/DL (ref 40–75)
HDLC SERPL: 34.7 % (ref 20–50)
HGB BLD-MCNC: 10.5 G/DL (ref 14–18)
HGB BLD-MCNC: 11 G/DL (ref 14–18)
HGB BLD-MCNC: 11 G/DL (ref 14–18)
HGB BLD-MCNC: 12.3 G/DL (ref 14–18)
HGB BLD-MCNC: 6 G/DL (ref 14–18)
HGB BLD-MCNC: 7.8 G/DL (ref 14–18)
HGB BLD-MCNC: 8.1 G/DL (ref 14–18)
IMM GRANULOCYTES # BLD AUTO: 0 K/UL (ref 0–0.04)
IMM GRANULOCYTES # BLD AUTO: 0.01 K/UL (ref 0–0.04)
IMM GRANULOCYTES # BLD AUTO: 0.02 K/UL (ref 0–0.04)
IMM GRANULOCYTES # BLD AUTO: 0.02 K/UL (ref 0–0.04)
IMM GRANULOCYTES NFR BLD AUTO: 0 % (ref 0–0.5)
IMM GRANULOCYTES NFR BLD AUTO: 0.2 % (ref 0–0.5)
IMM GRANULOCYTES NFR BLD AUTO: 0.2 % (ref 0–0.5)
IMM GRANULOCYTES NFR BLD AUTO: 0.3 % (ref 0–0.5)
IMM GRANULOCYTES NFR BLD AUTO: 0.3 % (ref 0–0.5)
IMM GRANULOCYTES NFR BLD AUTO: 0.4 % (ref 0–0.5)
IMM GRANULOCYTES NFR BLD AUTO: 0.4 % (ref 0–0.5)
IRON SERPL-MCNC: 91 UG/DL (ref 45–160)
LDLC SERPL CALC-MCNC: 54.4 MG/DL (ref 63–159)
LYMPHOCYTES # BLD AUTO: 0.5 K/UL (ref 1–4.8)
LYMPHOCYTES # BLD AUTO: 0.7 K/UL (ref 1–4.8)
LYMPHOCYTES # BLD AUTO: 0.7 K/UL (ref 1–4.8)
LYMPHOCYTES # BLD AUTO: 0.8 K/UL (ref 1–4.8)
LYMPHOCYTES # BLD AUTO: 0.8 K/UL (ref 1–4.8)
LYMPHOCYTES # BLD AUTO: 0.9 K/UL (ref 1–4.8)
LYMPHOCYTES # BLD AUTO: 0.9 K/UL (ref 1–4.8)
LYMPHOCYTES NFR BLD: 13.4 % (ref 18–48)
LYMPHOCYTES NFR BLD: 14.4 % (ref 18–48)
LYMPHOCYTES NFR BLD: 14.9 % (ref 18–48)
LYMPHOCYTES NFR BLD: 17.1 % (ref 18–48)
LYMPHOCYTES NFR BLD: 18 % (ref 18–48)
LYMPHOCYTES NFR BLD: 18.7 % (ref 18–48)
LYMPHOCYTES NFR BLD: 19.2 % (ref 18–48)
MCH RBC QN AUTO: 22.7 PG (ref 27–31)
MCH RBC QN AUTO: 25.9 PG (ref 27–31)
MCH RBC QN AUTO: 26.9 PG (ref 27–31)
MCH RBC QN AUTO: 27.2 PG (ref 27–31)
MCH RBC QN AUTO: 27.3 PG (ref 27–31)
MCH RBC QN AUTO: 28.7 PG (ref 27–31)
MCH RBC QN AUTO: 32.3 PG (ref 27–31)
MCHC RBC AUTO-ENTMCNC: 26.9 G/DL (ref 32–36)
MCHC RBC AUTO-ENTMCNC: 28 G/DL (ref 32–36)
MCHC RBC AUTO-ENTMCNC: 28.3 G/DL (ref 32–36)
MCHC RBC AUTO-ENTMCNC: 29.3 G/DL (ref 32–36)
MCHC RBC AUTO-ENTMCNC: 29.6 G/DL (ref 32–36)
MCHC RBC AUTO-ENTMCNC: 31.2 G/DL (ref 32–36)
MCHC RBC AUTO-ENTMCNC: 31.6 G/DL (ref 32–36)
MCV RBC AUTO: 102 FL (ref 82–98)
MCV RBC AUTO: 84 FL (ref 82–98)
MCV RBC AUTO: 92 FL (ref 82–98)
MCV RBC AUTO: 97 FL (ref 82–98)
MONOCYTES # BLD AUTO: 0.4 K/UL (ref 0.3–1)
MONOCYTES # BLD AUTO: 0.4 K/UL (ref 0.3–1)
MONOCYTES # BLD AUTO: 0.5 K/UL (ref 0.3–1)
MONOCYTES # BLD AUTO: 0.5 K/UL (ref 0.3–1)
MONOCYTES # BLD AUTO: 0.6 K/UL (ref 0.3–1)
MONOCYTES # BLD AUTO: 0.6 K/UL (ref 0.3–1)
MONOCYTES # BLD AUTO: 0.7 K/UL (ref 0.3–1)
MONOCYTES NFR BLD: 10 % (ref 4–15)
MONOCYTES NFR BLD: 12 % (ref 4–15)
MONOCYTES NFR BLD: 12.5 % (ref 4–15)
MONOCYTES NFR BLD: 12.6 % (ref 4–15)
MONOCYTES NFR BLD: 13.1 % (ref 4–15)
MONOCYTES NFR BLD: 14.4 % (ref 4–15)
MONOCYTES NFR BLD: 8 % (ref 4–15)
NEUTROPHILS # BLD AUTO: 2.5 K/UL (ref 1.8–7.7)
NEUTROPHILS # BLD AUTO: 2.6 K/UL (ref 1.8–7.7)
NEUTROPHILS # BLD AUTO: 2.8 K/UL (ref 1.8–7.7)
NEUTROPHILS # BLD AUTO: 3 K/UL (ref 1.8–7.7)
NEUTROPHILS # BLD AUTO: 3.2 K/UL (ref 1.8–7.7)
NEUTROPHILS # BLD AUTO: 3.5 K/UL (ref 1.8–7.7)
NEUTROPHILS # BLD AUTO: 3.9 K/UL (ref 1.8–7.7)
NEUTROPHILS NFR BLD: 63.7 % (ref 38–73)
NEUTROPHILS NFR BLD: 64.1 % (ref 38–73)
NEUTROPHILS NFR BLD: 65.2 % (ref 38–73)
NEUTROPHILS NFR BLD: 66.4 % (ref 38–73)
NEUTROPHILS NFR BLD: 67.9 % (ref 38–73)
NEUTROPHILS NFR BLD: 68 % (ref 38–73)
NEUTROPHILS NFR BLD: 73.6 % (ref 38–73)
NONHDLC SERPL-MCNC: 81 MG/DL
NRBC BLD-RTO: 0 /100 WBC
OB PNL STL: POSITIVE
PLATELET # BLD AUTO: 247 K/UL (ref 150–450)
PLATELET # BLD AUTO: 248 K/UL (ref 150–450)
PLATELET # BLD AUTO: 285 K/UL (ref 150–450)
PLATELET # BLD AUTO: 384 K/UL (ref 150–450)
PLATELET # BLD AUTO: 391 K/UL (ref 150–450)
PLATELET # BLD AUTO: 393 K/UL (ref 150–450)
PLATELET # BLD AUTO: 525 K/UL (ref 150–450)
PMV BLD AUTO: 10 FL (ref 9.2–12.9)
PMV BLD AUTO: 8.8 FL (ref 9.2–12.9)
PMV BLD AUTO: 8.9 FL (ref 9.2–12.9)
PMV BLD AUTO: 8.9 FL (ref 9.2–12.9)
PMV BLD AUTO: 9.2 FL (ref 9.2–12.9)
PMV BLD AUTO: 9.3 FL (ref 9.2–12.9)
PMV BLD AUTO: 9.6 FL (ref 9.2–12.9)
POTASSIUM SERPL-SCNC: 4.3 MMOL/L (ref 3.5–5.1)
POTASSIUM SERPL-SCNC: 4.5 MMOL/L (ref 3.5–5.1)
POTASSIUM SERPL-SCNC: 4.5 MMOL/L (ref 3.5–5.1)
POTASSIUM SERPL-SCNC: 4.7 MMOL/L (ref 3.5–5.1)
PROT SERPL-MCNC: 7.3 G/DL (ref 6–8.4)
PROT SERPL-MCNC: 7.4 G/DL (ref 6–8.4)
PROT SERPL-MCNC: 7.5 G/DL (ref 6–8.4)
PROT SERPL-MCNC: 7.8 G/DL (ref 6–8.4)
RBC # BLD AUTO: 2.2 M/UL (ref 4.6–6.2)
RBC # BLD AUTO: 3.01 M/UL (ref 4.6–6.2)
RBC # BLD AUTO: 3.25 M/UL (ref 4.6–6.2)
RBC # BLD AUTO: 3.57 M/UL (ref 4.6–6.2)
RBC # BLD AUTO: 3.83 M/UL (ref 4.6–6.2)
RBC # BLD AUTO: 4.09 M/UL (ref 4.6–6.2)
RBC # BLD AUTO: 4.53 M/UL (ref 4.6–6.2)
SATURATED IRON: 32 % (ref 20–50)
SODIUM SERPL-SCNC: 138 MMOL/L (ref 136–145)
SODIUM SERPL-SCNC: 139 MMOL/L (ref 136–145)
SODIUM SERPL-SCNC: 139 MMOL/L (ref 136–145)
SODIUM SERPL-SCNC: 140 MMOL/L (ref 136–145)
TOTAL IRON BINDING CAPACITY: 281 UG/DL (ref 250–450)
TRANSFERRIN SERPL-MCNC: 190 MG/DL (ref 200–375)
TRIGL SERPL-MCNC: 133 MG/DL (ref 30–150)
WBC # BLD AUTO: 3.67 K/UL (ref 3.9–12.7)
WBC # BLD AUTO: 3.9 K/UL (ref 3.9–12.7)
WBC # BLD AUTO: 4.43 K/UL (ref 3.9–12.7)
WBC # BLD AUTO: 4.57 K/UL (ref 3.9–12.7)
WBC # BLD AUTO: 5.06 K/UL (ref 3.9–12.7)
WBC # BLD AUTO: 5.08 K/UL (ref 3.9–12.7)
WBC # BLD AUTO: 5.25 K/UL (ref 3.9–12.7)

## 2022-01-01 PROCEDURE — 99499 RISK ADDL DX/OHS AUDIT: ICD-10-PCS | Mod: 95,,,

## 2022-01-01 PROCEDURE — 1125F PR PAIN SEVERITY QUANTIFIED, PAIN PRESENT: ICD-10-PCS | Mod: CPTII,S$GLB,, | Performed by: PHYSICIAN ASSISTANT

## 2022-01-01 PROCEDURE — 1126F AMNT PAIN NOTED NONE PRSNT: CPT | Mod: CPTII,S$GLB,,

## 2022-01-01 PROCEDURE — 3072F PR LOW RISK FOR RETINOPATHY: ICD-10-PCS | Mod: CPTII,S$GLB,, | Performed by: NURSE PRACTITIONER

## 2022-01-01 PROCEDURE — 1159F PR MEDICATION LIST DOCUMENTED IN MEDICAL RECORD: ICD-10-PCS | Mod: CPTII,S$GLB,, | Performed by: INTERNAL MEDICINE

## 2022-01-01 PROCEDURE — 3288F FALL RISK ASSESSMENT DOCD: CPT | Mod: CPTII,S$GLB,, | Performed by: INTERNAL MEDICINE

## 2022-01-01 PROCEDURE — 99999 PR PBB SHADOW E&M-EST. PATIENT-LVL IV: ICD-10-PCS | Mod: PBBFAC,,, | Performed by: PHYSICIAN ASSISTANT

## 2022-01-01 PROCEDURE — 3074F SYST BP LT 130 MM HG: CPT | Mod: CPTII,95,, | Performed by: INTERNAL MEDICINE

## 2022-01-01 PROCEDURE — 3074F SYST BP LT 130 MM HG: CPT | Mod: CPTII,S$GLB,, | Performed by: NURSE PRACTITIONER

## 2022-01-01 PROCEDURE — 99214 PR OFFICE/OUTPT VISIT, EST, LEVL IV, 30-39 MIN: ICD-10-PCS | Mod: S$GLB,,, | Performed by: ANESTHESIOLOGY

## 2022-01-01 PROCEDURE — 1159F MED LIST DOCD IN RCRD: CPT | Mod: CPTII,S$GLB,, | Performed by: NURSE PRACTITIONER

## 2022-01-01 PROCEDURE — 3078F PR MOST RECENT DIASTOLIC BLOOD PRESSURE < 80 MM HG: ICD-10-PCS | Mod: CPTII,S$GLB,, | Performed by: INTERNAL MEDICINE

## 2022-01-01 PROCEDURE — 3078F PR MOST RECENT DIASTOLIC BLOOD PRESSURE < 80 MM HG: ICD-10-PCS | Mod: CPTII,S$GLB,, | Performed by: FAMILY MEDICINE

## 2022-01-01 PROCEDURE — 99214 OFFICE O/P EST MOD 30 MIN: CPT | Mod: S$GLB,,,

## 2022-01-01 PROCEDURE — 3288F PR FALLS RISK ASSESSMENT DOCUMENTED: ICD-10-PCS | Mod: CPTII,S$GLB,, | Performed by: FAMILY MEDICINE

## 2022-01-01 PROCEDURE — 3078F DIAST BP <80 MM HG: CPT | Mod: CPTII,S$GLB,, | Performed by: NURSE PRACTITIONER

## 2022-01-01 PROCEDURE — 99999 PR PBB SHADOW E&M-EST. PATIENT-LVL I: CPT | Mod: PBBFAC,,, | Performed by: SOCIAL WORKER

## 2022-01-01 PROCEDURE — 3075F SYST BP GE 130 - 139MM HG: CPT | Mod: CPTII,S$GLB,, | Performed by: FAMILY MEDICINE

## 2022-01-01 PROCEDURE — 1126F PR PAIN SEVERITY QUANTIFIED, NO PAIN PRESENT: ICD-10-PCS | Mod: CPTII,S$GLB,, | Performed by: NURSE PRACTITIONER

## 2022-01-01 PROCEDURE — 3074F PR MOST RECENT SYSTOLIC BLOOD PRESSURE < 130 MM HG: ICD-10-PCS | Mod: CPTII,S$GLB,, | Performed by: ANESTHESIOLOGY

## 2022-01-01 PROCEDURE — 63600175 PHARM REV CODE 636 W HCPCS: Mod: JG,EC | Performed by: INTERNAL MEDICINE

## 2022-01-01 PROCEDURE — 3072F LOW RISK FOR RETINOPATHY: CPT | Mod: CPTII,S$GLB,, | Performed by: PHYSICIAN ASSISTANT

## 2022-01-01 PROCEDURE — 3072F PR LOW RISK FOR RETINOPATHY: ICD-10-PCS | Mod: CPTII,S$GLB,, | Performed by: PHYSICIAN ASSISTANT

## 2022-01-01 PROCEDURE — 80053 COMPREHEN METABOLIC PANEL: CPT

## 2022-01-01 PROCEDURE — 99999 PR PBB SHADOW E&M-EST. PATIENT-LVL III: CPT | Mod: PBBFAC,,, | Performed by: NURSE PRACTITIONER

## 2022-01-01 PROCEDURE — 1125F PR PAIN SEVERITY QUANTIFIED, PAIN PRESENT: ICD-10-PCS | Mod: CPTII,S$GLB,, | Performed by: FAMILY MEDICINE

## 2022-01-01 PROCEDURE — 99214 OFFICE O/P EST MOD 30 MIN: CPT | Mod: S$GLB,,, | Performed by: INTERNAL MEDICINE

## 2022-01-01 PROCEDURE — 3288F PR FALLS RISK ASSESSMENT DOCUMENTED: ICD-10-PCS | Mod: CPTII,S$GLB,, | Performed by: NURSE PRACTITIONER

## 2022-01-01 PROCEDURE — 3288F PR FALLS RISK ASSESSMENT DOCUMENTED: ICD-10-PCS | Mod: CPTII,S$GLB,, | Performed by: INTERNAL MEDICINE

## 2022-01-01 PROCEDURE — 99999 PR PBB SHADOW E&M-EST. PATIENT-LVL V: CPT | Mod: PBBFAC,,, | Performed by: NURSE PRACTITIONER

## 2022-01-01 PROCEDURE — 99214 OFFICE O/P EST MOD 30 MIN: CPT | Mod: 25,S$GLB,, | Performed by: NURSE PRACTITIONER

## 2022-01-01 PROCEDURE — 99214 PR OFFICE/OUTPT VISIT, EST, LEVL IV, 30-39 MIN: ICD-10-PCS | Mod: S$GLB,,, | Performed by: INTERNAL MEDICINE

## 2022-01-01 PROCEDURE — 1159F MED LIST DOCD IN RCRD: CPT | Mod: CPTII,S$GLB,, | Performed by: INTERNAL MEDICINE

## 2022-01-01 PROCEDURE — 99999 PR PBB SHADOW E&M-EST. PATIENT-LVL I: ICD-10-PCS | Mod: PBBFAC,,, | Performed by: SOCIAL WORKER

## 2022-01-01 PROCEDURE — 3078F PR MOST RECENT DIASTOLIC BLOOD PRESSURE < 80 MM HG: ICD-10-PCS | Mod: CPTII,S$GLB,,

## 2022-01-01 PROCEDURE — 3072F PR LOW RISK FOR RETINOPATHY: ICD-10-PCS | Mod: CPTII,S$GLB,, | Performed by: SOCIAL WORKER

## 2022-01-01 PROCEDURE — 1159F MED LIST DOCD IN RCRD: CPT | Mod: CPTII,S$GLB,, | Performed by: COLON & RECTAL SURGERY

## 2022-01-01 PROCEDURE — 3288F FALL RISK ASSESSMENT DOCD: CPT | Mod: CPTII,S$GLB,, | Performed by: PHYSICIAN ASSISTANT

## 2022-01-01 PROCEDURE — 1126F PR PAIN SEVERITY QUANTIFIED, NO PAIN PRESENT: ICD-10-PCS | Mod: CPTII,S$GLB,,

## 2022-01-01 PROCEDURE — 3078F PR MOST RECENT DIASTOLIC BLOOD PRESSURE < 80 MM HG: ICD-10-PCS | Mod: CPTII,S$GLB,, | Performed by: NURSE PRACTITIONER

## 2022-01-01 PROCEDURE — 99999 PR PBB SHADOW E&M-EST. PATIENT-LVL III: ICD-10-PCS | Mod: PBBFAC,,, | Performed by: NURSE PRACTITIONER

## 2022-01-01 PROCEDURE — 96372 THER/PROPH/DIAG INJ SC/IM: CPT

## 2022-01-01 PROCEDURE — 99999 PR PBB SHADOW E&M-EST. PATIENT-LVL IV: CPT | Mod: PBBFAC,,,

## 2022-01-01 PROCEDURE — 99214 PR OFFICE/OUTPT VISIT, EST, LEVL IV, 30-39 MIN: ICD-10-PCS | Mod: S$GLB,,, | Performed by: NURSE PRACTITIONER

## 2022-01-01 PROCEDURE — 99999 PR PBB SHADOW E&M-EST. PATIENT-LVL IV: CPT | Mod: PBBFAC,,, | Performed by: ANESTHESIOLOGY

## 2022-01-01 PROCEDURE — 1124F ACP DISCUSS-NO DSCNMKR DOCD: CPT | Mod: CPTII,S$GLB,, | Performed by: NURSE PRACTITIONER

## 2022-01-01 PROCEDURE — 25000003 PHARM REV CODE 250

## 2022-01-01 PROCEDURE — 85025 COMPLETE CBC W/AUTO DIFF WBC: CPT

## 2022-01-01 PROCEDURE — 99214 PR OFFICE/OUTPT VISIT, EST, LEVL IV, 30-39 MIN: ICD-10-PCS | Mod: 95,,, | Performed by: ANESTHESIOLOGY

## 2022-01-01 PROCEDURE — 99214 PR OFFICE/OUTPT VISIT, EST, LEVL IV, 30-39 MIN: ICD-10-PCS | Mod: S$GLB,,, | Performed by: FAMILY MEDICINE

## 2022-01-01 PROCEDURE — 3288F FALL RISK ASSESSMENT DOCD: CPT | Mod: CPTII,S$GLB,,

## 2022-01-01 PROCEDURE — 63600175 PHARM REV CODE 636 W HCPCS: Mod: JG | Performed by: NURSE PRACTITIONER

## 2022-01-01 PROCEDURE — 3072F LOW RISK FOR RETINOPATHY: CPT | Mod: CPTII,S$GLB,, | Performed by: ANESTHESIOLOGY

## 2022-01-01 PROCEDURE — 85025 COMPLETE CBC W/AUTO DIFF WBC: CPT | Performed by: NURSE PRACTITIONER

## 2022-01-01 PROCEDURE — 90834 PSYTX W PT 45 MINUTES: CPT | Mod: S$GLB,,, | Performed by: SOCIAL WORKER

## 2022-01-01 PROCEDURE — 99499 RISK ADDL DX/OHS AUDIT: ICD-10-PCS | Mod: 95,,, | Performed by: ANESTHESIOLOGY

## 2022-01-01 PROCEDURE — 36415 COLL VENOUS BLD VENIPUNCTURE: CPT | Mod: PO | Performed by: INTERNAL MEDICINE

## 2022-01-01 PROCEDURE — 99999 PR PBB SHADOW E&M-EST. PATIENT-LVL V: ICD-10-PCS | Mod: PBBFAC,,, | Performed by: FAMILY MEDICINE

## 2022-01-01 PROCEDURE — 25000003 PHARM REV CODE 250: Performed by: NURSE PRACTITIONER

## 2022-01-01 PROCEDURE — 3078F DIAST BP <80 MM HG: CPT | Mod: CPTII,S$GLB,, | Performed by: FAMILY MEDICINE

## 2022-01-01 PROCEDURE — 1126F PR PAIN SEVERITY QUANTIFIED, NO PAIN PRESENT: ICD-10-PCS | Mod: CPTII,S$GLB,, | Performed by: INTERNAL MEDICINE

## 2022-01-01 PROCEDURE — 93296 REM INTERROG EVL PM/IDS: CPT | Performed by: INTERNAL MEDICINE

## 2022-01-01 PROCEDURE — 3072F PR LOW RISK FOR RETINOPATHY: ICD-10-PCS | Mod: CPTII,S$GLB,, | Performed by: INTERNAL MEDICINE

## 2022-01-01 PROCEDURE — 99499 RISK ADDL DX/OHS AUDIT: ICD-10-PCS | Mod: 95,,, | Performed by: INTERNAL MEDICINE

## 2022-01-01 PROCEDURE — 3078F DIAST BP <80 MM HG: CPT | Mod: CPTII,S$GLB,, | Performed by: INTERNAL MEDICINE

## 2022-01-01 PROCEDURE — 99215 PR OFFICE/OUTPT VISIT, EST, LEVL V, 40-54 MIN: ICD-10-PCS | Mod: S$GLB,,,

## 2022-01-01 PROCEDURE — 99999 PR PBB SHADOW E&M-EST. PATIENT-LVL V: ICD-10-PCS | Mod: PBBFAC,,, | Performed by: NURSE PRACTITIONER

## 2022-01-01 PROCEDURE — 1160F RVW MEDS BY RX/DR IN RCRD: CPT | Mod: CPTII,S$GLB,, | Performed by: INTERNAL MEDICINE

## 2022-01-01 PROCEDURE — 1101F PR PT FALLS ASSESS DOC 0-1 FALLS W/OUT INJ PAST YR: ICD-10-PCS | Mod: CPTII,S$GLB,,

## 2022-01-01 PROCEDURE — 99499 RISK ADDL DX/OHS AUDIT: ICD-10-PCS | Mod: S$GLB,,, | Performed by: INTERNAL MEDICINE

## 2022-01-01 PROCEDURE — 1125F AMNT PAIN NOTED PAIN PRSNT: CPT | Mod: CPTII,S$GLB,, | Performed by: FAMILY MEDICINE

## 2022-01-01 PROCEDURE — 99999 PR PBB SHADOW E&M-EST. PATIENT-LVL V: ICD-10-PCS | Mod: PBBFAC,,, | Performed by: INTERNAL MEDICINE

## 2022-01-01 PROCEDURE — 3288F FALL RISK ASSESSMENT DOCD: CPT | Mod: CPTII,S$GLB,, | Performed by: FAMILY MEDICINE

## 2022-01-01 PROCEDURE — 1160F RVW MEDS BY RX/DR IN RCRD: CPT | Mod: CPTII,95,, | Performed by: ANESTHESIOLOGY

## 2022-01-01 PROCEDURE — 90834 PR PSYCHOTHERAPY W/PATIENT, 45 MIN: ICD-10-PCS | Mod: S$GLB,,, | Performed by: SOCIAL WORKER

## 2022-01-01 PROCEDURE — 1126F PR PAIN SEVERITY QUANTIFIED, NO PAIN PRESENT: ICD-10-PCS | Mod: CPTII,S$GLB,, | Performed by: COLON & RECTAL SURGERY

## 2022-01-01 PROCEDURE — 3072F PR LOW RISK FOR RETINOPATHY: ICD-10-PCS | Mod: CPTII,95,,

## 2022-01-01 PROCEDURE — 1159F PR MEDICATION LIST DOCUMENTED IN MEDICAL RECORD: ICD-10-PCS | Mod: CPTII,95,, | Performed by: INTERNAL MEDICINE

## 2022-01-01 PROCEDURE — 1126F AMNT PAIN NOTED NONE PRSNT: CPT | Mod: CPTII,S$GLB,, | Performed by: NURSE PRACTITIONER

## 2022-01-01 PROCEDURE — 1160F PR REVIEW ALL MEDS BY PRESCRIBER/CLIN PHARMACIST DOCUMENTED: ICD-10-PCS | Mod: CPTII,95,, | Performed by: ANESTHESIOLOGY

## 2022-01-01 PROCEDURE — 96372 THER/PROPH/DIAG INJ SC/IM: CPT | Mod: 59

## 2022-01-01 PROCEDURE — 1101F PR PT FALLS ASSESS DOC 0-1 FALLS W/OUT INJ PAST YR: ICD-10-PCS | Mod: CPTII,S$GLB,, | Performed by: PHYSICIAN ASSISTANT

## 2022-01-01 PROCEDURE — 63600175 PHARM REV CODE 636 W HCPCS: Mod: JG | Performed by: INTERNAL MEDICINE

## 2022-01-01 PROCEDURE — 99214 PR OFFICE/OUTPT VISIT, EST, LEVL IV, 30-39 MIN: ICD-10-PCS | Mod: 95,,,

## 2022-01-01 PROCEDURE — 1125F AMNT PAIN NOTED PAIN PRSNT: CPT | Mod: CPTII,S$GLB,, | Performed by: ANESTHESIOLOGY

## 2022-01-01 PROCEDURE — 3074F PR MOST RECENT SYSTOLIC BLOOD PRESSURE < 130 MM HG: ICD-10-PCS | Mod: CPTII,S$GLB,, | Performed by: NURSE PRACTITIONER

## 2022-01-01 PROCEDURE — 99214 OFFICE O/P EST MOD 30 MIN: CPT | Mod: 95,,, | Performed by: INTERNAL MEDICINE

## 2022-01-01 PROCEDURE — 3072F PR LOW RISK FOR RETINOPATHY: ICD-10-PCS | Mod: CPTII,S$GLB,,

## 2022-01-01 PROCEDURE — 3072F LOW RISK FOR RETINOPATHY: CPT | Mod: CPTII,S$GLB,, | Performed by: INTERNAL MEDICINE

## 2022-01-01 PROCEDURE — 63600175 PHARM REV CODE 636 W HCPCS: Mod: JG,EA | Performed by: INTERNAL MEDICINE

## 2022-01-01 PROCEDURE — 1126F PR PAIN SEVERITY QUANTIFIED, NO PAIN PRESENT: ICD-10-PCS | Mod: CPTII,95,, | Performed by: INTERNAL MEDICINE

## 2022-01-01 PROCEDURE — 3288F FALL RISK ASSESSMENT DOCD: CPT | Mod: CPTII,S$GLB,, | Performed by: NURSE PRACTITIONER

## 2022-01-01 PROCEDURE — 46600 DIAGNOSTIC ANOSCOPY SPX: CPT | Mod: S$GLB,,, | Performed by: COLON & RECTAL SURGERY

## 2022-01-01 PROCEDURE — 80053 COMPREHEN METABOLIC PANEL: CPT | Performed by: NURSE PRACTITIONER

## 2022-01-01 PROCEDURE — 1159F PR MEDICATION LIST DOCUMENTED IN MEDICAL RECORD: ICD-10-PCS | Mod: CPTII,S$GLB,, | Performed by: NURSE PRACTITIONER

## 2022-01-01 PROCEDURE — 99999 PR PBB SHADOW E&M-EST. PATIENT-LVL IV: ICD-10-PCS | Mod: PBBFAC,,,

## 2022-01-01 PROCEDURE — 1159F PR MEDICATION LIST DOCUMENTED IN MEDICAL RECORD: ICD-10-PCS | Mod: CPTII,S$GLB,, | Performed by: ANESTHESIOLOGY

## 2022-01-01 PROCEDURE — 3074F SYST BP LT 130 MM HG: CPT | Mod: CPTII,S$GLB,, | Performed by: INTERNAL MEDICINE

## 2022-01-01 PROCEDURE — 1159F MED LIST DOCD IN RCRD: CPT | Mod: CPTII,S$GLB,, | Performed by: FAMILY MEDICINE

## 2022-01-01 PROCEDURE — 3074F SYST BP LT 130 MM HG: CPT | Mod: CPTII,S$GLB,,

## 2022-01-01 PROCEDURE — 1126F AMNT PAIN NOTED NONE PRSNT: CPT | Mod: CPTII,S$GLB,, | Performed by: INTERNAL MEDICINE

## 2022-01-01 PROCEDURE — 1101F PT FALLS ASSESS-DOCD LE1/YR: CPT | Mod: CPTII,S$GLB,, | Performed by: PHYSICIAN ASSISTANT

## 2022-01-01 PROCEDURE — 1124F PR ADV CARE PLAN DISCUSSED, UNABLE/UNWILL DOC PLAN OR SURROGATE: ICD-10-PCS | Mod: CPTII,S$GLB,, | Performed by: NURSE PRACTITIONER

## 2022-01-01 PROCEDURE — 99204 OFFICE O/P NEW MOD 45 MIN: CPT | Mod: 25,S$GLB,, | Performed by: COLON & RECTAL SURGERY

## 2022-01-01 PROCEDURE — 3078F DIAST BP <80 MM HG: CPT | Mod: CPTII,95,, | Performed by: INTERNAL MEDICINE

## 2022-01-01 PROCEDURE — 1160F PR REVIEW ALL MEDS BY PRESCRIBER/CLIN PHARMACIST DOCUMENTED: ICD-10-PCS | Mod: CPTII,S$GLB,,

## 2022-01-01 PROCEDURE — 3072F LOW RISK FOR RETINOPATHY: CPT | Mod: CPTII,S$GLB,,

## 2022-01-01 PROCEDURE — 96365 THER/PROPH/DIAG IV INF INIT: CPT

## 2022-01-01 PROCEDURE — 3072F PR LOW RISK FOR RETINOPATHY: ICD-10-PCS | Mod: CPTII,95,, | Performed by: INTERNAL MEDICINE

## 2022-01-01 PROCEDURE — 1125F AMNT PAIN NOTED PAIN PRSNT: CPT | Mod: CPTII,S$GLB,, | Performed by: PHYSICIAN ASSISTANT

## 2022-01-01 PROCEDURE — 99214 OFFICE O/P EST MOD 30 MIN: CPT | Mod: S$GLB,,, | Performed by: PHYSICIAN ASSISTANT

## 2022-01-01 PROCEDURE — 36415 COLL VENOUS BLD VENIPUNCTURE: CPT

## 2022-01-01 PROCEDURE — 99214 OFFICE O/P EST MOD 30 MIN: CPT | Mod: S$GLB,,, | Performed by: FAMILY MEDICINE

## 2022-01-01 PROCEDURE — 36415 COLL VENOUS BLD VENIPUNCTURE: CPT | Mod: PO | Performed by: FAMILY MEDICINE

## 2022-01-01 PROCEDURE — 3074F PR MOST RECENT SYSTOLIC BLOOD PRESSURE < 130 MM HG: ICD-10-PCS | Mod: CPTII,S$GLB,,

## 2022-01-01 PROCEDURE — 80061 LIPID PANEL: CPT | Performed by: NURSE PRACTITIONER

## 2022-01-01 PROCEDURE — 3074F PR MOST RECENT SYSTOLIC BLOOD PRESSURE < 130 MM HG: ICD-10-PCS | Mod: CPTII,S$GLB,, | Performed by: PHYSICIAN ASSISTANT

## 2022-01-01 PROCEDURE — 3072F LOW RISK FOR RETINOPATHY: CPT | Mod: CPTII,95,,

## 2022-01-01 PROCEDURE — 99999 PR PBB SHADOW E&M-EST. PATIENT-LVL IV: CPT | Mod: PBBFAC,,, | Performed by: INTERNAL MEDICINE

## 2022-01-01 PROCEDURE — 99214 PR OFFICE/OUTPT VISIT, EST, LEVL IV, 30-39 MIN: ICD-10-PCS | Mod: S$GLB,,, | Performed by: PHYSICIAN ASSISTANT

## 2022-01-01 PROCEDURE — 1160F PR REVIEW ALL MEDS BY PRESCRIBER/CLIN PHARMACIST DOCUMENTED: ICD-10-PCS | Mod: CPTII,S$GLB,, | Performed by: INTERNAL MEDICINE

## 2022-01-01 PROCEDURE — 84466 ASSAY OF TRANSFERRIN: CPT

## 2022-01-01 PROCEDURE — 1101F PR PT FALLS ASSESS DOC 0-1 FALLS W/OUT INJ PAST YR: ICD-10-PCS | Mod: CPTII,S$GLB,, | Performed by: FAMILY MEDICINE

## 2022-01-01 PROCEDURE — 36415 COLL VENOUS BLD VENIPUNCTURE: CPT | Mod: PO

## 2022-01-01 PROCEDURE — 3074F PR MOST RECENT SYSTOLIC BLOOD PRESSURE < 130 MM HG: ICD-10-PCS | Mod: CPTII,S$GLB,, | Performed by: COLON & RECTAL SURGERY

## 2022-01-01 PROCEDURE — 3074F SYST BP LT 130 MM HG: CPT | Mod: CPTII,S$GLB,, | Performed by: PHYSICIAN ASSISTANT

## 2022-01-01 PROCEDURE — 99214 PR OFFICE/OUTPT VISIT, EST, LEVL IV, 30-39 MIN: ICD-10-PCS | Mod: 25,95,, | Performed by: INTERNAL MEDICINE

## 2022-01-01 PROCEDURE — 85025 COMPLETE CBC W/AUTO DIFF WBC: CPT | Performed by: INTERNAL MEDICINE

## 2022-01-01 PROCEDURE — 3072F PR LOW RISK FOR RETINOPATHY: ICD-10-PCS | Mod: CPTII,S$GLB,, | Performed by: FAMILY MEDICINE

## 2022-01-01 PROCEDURE — 99215 OFFICE O/P EST HI 40 MIN: CPT | Mod: S$GLB,,,

## 2022-01-01 PROCEDURE — 1101F PT FALLS ASSESS-DOCD LE1/YR: CPT | Mod: CPTII,S$GLB,,

## 2022-01-01 PROCEDURE — 3288F PR FALLS RISK ASSESSMENT DOCUMENTED: ICD-10-PCS | Mod: CPTII,S$GLB,,

## 2022-01-01 PROCEDURE — 99999 PR PBB SHADOW E&M-EST. PATIENT-LVL III: ICD-10-PCS | Mod: PBBFAC,,, | Performed by: COLON & RECTAL SURGERY

## 2022-01-01 PROCEDURE — 1159F MED LIST DOCD IN RCRD: CPT | Mod: CPTII,S$GLB,,

## 2022-01-01 PROCEDURE — 3072F PR LOW RISK FOR RETINOPATHY: ICD-10-PCS | Mod: CPTII,95,, | Performed by: ANESTHESIOLOGY

## 2022-01-01 PROCEDURE — 3078F PR MOST RECENT DIASTOLIC BLOOD PRESSURE < 80 MM HG: ICD-10-PCS | Mod: CPTII,S$GLB,, | Performed by: PHYSICIAN ASSISTANT

## 2022-01-01 PROCEDURE — 36415 COLL VENOUS BLD VENIPUNCTURE: CPT | Mod: PO | Performed by: NURSE PRACTITIONER

## 2022-01-01 PROCEDURE — 99499 UNLISTED E&M SERVICE: CPT | Mod: S$GLB,,, | Performed by: INTERNAL MEDICINE

## 2022-01-01 PROCEDURE — 1160F PR REVIEW ALL MEDS BY PRESCRIBER/CLIN PHARMACIST DOCUMENTED: ICD-10-PCS | Mod: CPTII,S$GLB,, | Performed by: NURSE PRACTITIONER

## 2022-01-01 PROCEDURE — 93294 REM INTERROG EVL PM/LDLS PM: CPT | Mod: S$GLB,,, | Performed by: INTERNAL MEDICINE

## 2022-01-01 PROCEDURE — 1159F PR MEDICATION LIST DOCUMENTED IN MEDICAL RECORD: ICD-10-PCS | Mod: CPTII,S$GLB,, | Performed by: PHYSICIAN ASSISTANT

## 2022-01-01 PROCEDURE — 3078F DIAST BP <80 MM HG: CPT | Mod: CPTII,S$GLB,, | Performed by: PHYSICIAN ASSISTANT

## 2022-01-01 PROCEDURE — 3072F LOW RISK FOR RETINOPATHY: CPT | Mod: CPTII,S$GLB,, | Performed by: FAMILY MEDICINE

## 2022-01-01 PROCEDURE — 1170F FXNL STATUS ASSESSED: CPT | Mod: CPTII,S$GLB,, | Performed by: NURSE PRACTITIONER

## 2022-01-01 PROCEDURE — 1101F PT FALLS ASSESS-DOCD LE1/YR: CPT | Mod: CPTII,S$GLB,, | Performed by: NURSE PRACTITIONER

## 2022-01-01 PROCEDURE — 3074F SYST BP LT 130 MM HG: CPT | Mod: CPTII,S$GLB,, | Performed by: ANESTHESIOLOGY

## 2022-01-01 PROCEDURE — 1101F PR PT FALLS ASSESS DOC 0-1 FALLS W/OUT INJ PAST YR: ICD-10-PCS | Mod: CPTII,S$GLB,, | Performed by: NURSE PRACTITIONER

## 2022-01-01 PROCEDURE — 3072F LOW RISK FOR RETINOPATHY: CPT | Mod: CPTII,S$GLB,, | Performed by: NURSE PRACTITIONER

## 2022-01-01 PROCEDURE — 3074F PR MOST RECENT SYSTOLIC BLOOD PRESSURE < 130 MM HG: ICD-10-PCS | Mod: CPTII,S$GLB,, | Performed by: INTERNAL MEDICINE

## 2022-01-01 PROCEDURE — 3078F PR MOST RECENT DIASTOLIC BLOOD PRESSURE < 80 MM HG: ICD-10-PCS | Mod: CPTII,S$GLB,, | Performed by: ANESTHESIOLOGY

## 2022-01-01 PROCEDURE — 1160F RVW MEDS BY RX/DR IN RCRD: CPT | Mod: CPTII,S$GLB,, | Performed by: ANESTHESIOLOGY

## 2022-01-01 PROCEDURE — 99999 PR PBB SHADOW E&M-EST. PATIENT-LVL IV: ICD-10-PCS | Mod: PBBFAC,,, | Performed by: INTERNAL MEDICINE

## 2022-01-01 PROCEDURE — 1160F RVW MEDS BY RX/DR IN RCRD: CPT | Mod: CPTII,S$GLB,, | Performed by: NURSE PRACTITIONER

## 2022-01-01 PROCEDURE — 83036 HEMOGLOBIN GLYCOSYLATED A1C: CPT | Performed by: FAMILY MEDICINE

## 2022-01-01 PROCEDURE — 1159F PR MEDICATION LIST DOCUMENTED IN MEDICAL RECORD: ICD-10-PCS | Mod: CPTII,S$GLB,,

## 2022-01-01 PROCEDURE — G0439 PR MEDICARE ANNUAL WELLNESS SUBSEQUENT VISIT: ICD-10-PCS | Mod: S$GLB,,, | Performed by: NURSE PRACTITIONER

## 2022-01-01 PROCEDURE — 99212 OFFICE O/P EST SF 10 MIN: CPT | Mod: S$GLB,,, | Performed by: INTERNAL MEDICINE

## 2022-01-01 PROCEDURE — 3074F PR MOST RECENT SYSTOLIC BLOOD PRESSURE < 130 MM HG: ICD-10-PCS | Mod: CPTII,95,, | Performed by: INTERNAL MEDICINE

## 2022-01-01 PROCEDURE — 1126F AMNT PAIN NOTED NONE PRSNT: CPT | Mod: CPTII,S$GLB,, | Performed by: COLON & RECTAL SURGERY

## 2022-01-01 PROCEDURE — 3072F LOW RISK FOR RETINOPATHY: CPT | Mod: CPTII,S$GLB,, | Performed by: SOCIAL WORKER

## 2022-01-01 PROCEDURE — 99213 PR OFFICE/OUTPT VISIT, EST, LEVL III, 20-29 MIN: ICD-10-PCS | Mod: S$GLB,,, | Performed by: INTERNAL MEDICINE

## 2022-01-01 PROCEDURE — 1160F RVW MEDS BY RX/DR IN RCRD: CPT | Mod: CPTII,95,, | Performed by: INTERNAL MEDICINE

## 2022-01-01 PROCEDURE — 1101F PT FALLS ASSESS-DOCD LE1/YR: CPT | Mod: CPTII,S$GLB,, | Performed by: INTERNAL MEDICINE

## 2022-01-01 PROCEDURE — 99214 OFFICE O/P EST MOD 30 MIN: CPT | Mod: 95,,,

## 2022-01-01 PROCEDURE — 99214 OFFICE O/P EST MOD 30 MIN: CPT | Mod: S$GLB,,, | Performed by: NURSE PRACTITIONER

## 2022-01-01 PROCEDURE — 3072F PR LOW RISK FOR RETINOPATHY: ICD-10-PCS | Mod: CPTII,S$GLB,, | Performed by: ANESTHESIOLOGY

## 2022-01-01 PROCEDURE — 99499 UNLISTED E&M SERVICE: CPT | Mod: S$GLB,,, | Performed by: NURSE PRACTITIONER

## 2022-01-01 PROCEDURE — 1159F PR MEDICATION LIST DOCUMENTED IN MEDICAL RECORD: ICD-10-PCS | Mod: CPTII,S$GLB,, | Performed by: FAMILY MEDICINE

## 2022-01-01 PROCEDURE — 1126F AMNT PAIN NOTED NONE PRSNT: CPT | Mod: CPTII,95,, | Performed by: INTERNAL MEDICINE

## 2022-01-01 PROCEDURE — 1159F PR MEDICATION LIST DOCUMENTED IN MEDICAL RECORD: ICD-10-PCS | Mod: CPTII,S$GLB,, | Performed by: COLON & RECTAL SURGERY

## 2022-01-01 PROCEDURE — 36430 TRANSFUSION BLD/BLD COMPNT: CPT

## 2022-01-01 PROCEDURE — 3072F LOW RISK FOR RETINOPATHY: CPT | Mod: CPTII,95,, | Performed by: INTERNAL MEDICINE

## 2022-01-01 PROCEDURE — 93281 PM DEVICE PROGR EVAL MULTI: CPT | Mod: 26,,, | Performed by: INTERNAL MEDICINE

## 2022-01-01 PROCEDURE — 99999 PR PBB SHADOW E&M-EST. PATIENT-LVL V: CPT | Mod: PBBFAC,,, | Performed by: INTERNAL MEDICINE

## 2022-01-01 PROCEDURE — 99999 PR PBB SHADOW E&M-EST. PATIENT-LVL V: CPT | Mod: PBBFAC,,, | Performed by: FAMILY MEDICINE

## 2022-01-01 PROCEDURE — 99999 PR PBB SHADOW E&M-EST. PATIENT-LVL IV: ICD-10-PCS | Mod: PBBFAC,,, | Performed by: ANESTHESIOLOGY

## 2022-01-01 PROCEDURE — 99499 RISK ADDL DX/OHS AUDIT: ICD-10-PCS | Mod: S$GLB,,,

## 2022-01-01 PROCEDURE — 3288F PR FALLS RISK ASSESSMENT DOCUMENTED: ICD-10-PCS | Mod: CPTII,S$GLB,, | Performed by: PHYSICIAN ASSISTANT

## 2022-01-01 PROCEDURE — 63600175 PHARM REV CODE 636 W HCPCS: Mod: JG

## 2022-01-01 PROCEDURE — 3074F SYST BP LT 130 MM HG: CPT | Mod: CPTII,S$GLB,, | Performed by: COLON & RECTAL SURGERY

## 2022-01-01 PROCEDURE — 93281 CARDIAC DEVICE CHECK - IN CLINIC & HOSPITAL: ICD-10-PCS | Mod: 26,,, | Performed by: INTERNAL MEDICINE

## 2022-01-01 PROCEDURE — 3288F FALL RISK ASSESSMENT DOCD: CPT | Mod: CPTII,S$GLB,, | Performed by: ANESTHESIOLOGY

## 2022-01-01 PROCEDURE — 25000003 PHARM REV CODE 250: Performed by: INTERNAL MEDICINE

## 2022-01-01 PROCEDURE — 1159F MED LIST DOCD IN RCRD: CPT | Mod: CPTII,S$GLB,, | Performed by: PHYSICIAN ASSISTANT

## 2022-01-01 PROCEDURE — 3075F PR MOST RECENT SYSTOLIC BLOOD PRESS GE 130-139MM HG: ICD-10-PCS | Mod: CPTII,S$GLB,, | Performed by: INTERNAL MEDICINE

## 2022-01-01 PROCEDURE — 63600175 PHARM REV CODE 636 W HCPCS: Mod: JG,EC | Performed by: NURSE PRACTITIONER

## 2022-01-01 PROCEDURE — 99212 PR OFFICE/OUTPT VISIT, EST, LEVL II, 10-19 MIN: ICD-10-PCS | Mod: S$GLB,,, | Performed by: INTERNAL MEDICINE

## 2022-01-01 PROCEDURE — 93281 PM DEVICE PROGR EVAL MULTI: CPT

## 2022-01-01 PROCEDURE — 3078F PR MOST RECENT DIASTOLIC BLOOD PRESSURE < 80 MM HG: ICD-10-PCS | Mod: CPTII,95,, | Performed by: INTERNAL MEDICINE

## 2022-01-01 PROCEDURE — 1160F RVW MEDS BY RX/DR IN RCRD: CPT | Mod: CPTII,S$GLB,,

## 2022-01-01 PROCEDURE — 99214 PR OFFICE/OUTPT VISIT, EST, LEVL IV, 30-39 MIN: ICD-10-PCS | Mod: S$GLB,,,

## 2022-01-01 PROCEDURE — 99499 RISK ADDL DX/OHS AUDIT: ICD-10-PCS | Mod: S$GLB,,, | Performed by: NURSE PRACTITIONER

## 2022-01-01 PROCEDURE — 72128 CT CHEST SPINE W/O DYE: CPT | Mod: TC

## 2022-01-01 PROCEDURE — 82728 ASSAY OF FERRITIN: CPT

## 2022-01-01 PROCEDURE — G0439 PPPS, SUBSEQ VISIT: HCPCS | Mod: S$GLB,,, | Performed by: NURSE PRACTITIONER

## 2022-01-01 PROCEDURE — 3078F DIAST BP <80 MM HG: CPT | Mod: CPTII,S$GLB,,

## 2022-01-01 PROCEDURE — 1101F PR PT FALLS ASSESS DOC 0-1 FALLS W/OUT INJ PAST YR: ICD-10-PCS | Mod: CPTII,S$GLB,, | Performed by: ANESTHESIOLOGY

## 2022-01-01 PROCEDURE — 3072F LOW RISK FOR RETINOPATHY: CPT | Mod: CPTII,95,, | Performed by: ANESTHESIOLOGY

## 2022-01-01 PROCEDURE — 82272 OCCULT BLD FECES 1-3 TESTS: CPT | Performed by: INTERNAL MEDICINE

## 2022-01-01 PROCEDURE — 99214 PR OFFICE/OUTPT VISIT, EST, LEVL IV, 30-39 MIN: ICD-10-PCS | Mod: 25,S$GLB,, | Performed by: NURSE PRACTITIONER

## 2022-01-01 PROCEDURE — 99214 PR OFFICE/OUTPT VISIT, EST, LEVL IV, 30-39 MIN: ICD-10-PCS | Mod: 95,,, | Performed by: INTERNAL MEDICINE

## 2022-01-01 PROCEDURE — 99214 OFFICE O/P EST MOD 30 MIN: CPT | Mod: 95,,, | Performed by: ANESTHESIOLOGY

## 2022-01-01 PROCEDURE — 3078F PR MOST RECENT DIASTOLIC BLOOD PRESSURE < 80 MM HG: ICD-10-PCS | Mod: CPTII,S$GLB,, | Performed by: COLON & RECTAL SURGERY

## 2022-01-01 PROCEDURE — 99999 PR PBB SHADOW E&M-EST. PATIENT-LVL IV: CPT | Mod: PBBFAC,,, | Performed by: PHYSICIAN ASSISTANT

## 2022-01-01 PROCEDURE — 99499 UNLISTED E&M SERVICE: CPT | Mod: 95,,, | Performed by: ANESTHESIOLOGY

## 2022-01-01 PROCEDURE — 99214 OFFICE O/P EST MOD 30 MIN: CPT | Mod: S$GLB,,, | Performed by: ANESTHESIOLOGY

## 2022-01-01 PROCEDURE — 99214 OFFICE O/P EST MOD 30 MIN: CPT | Mod: 25,95,, | Performed by: INTERNAL MEDICINE

## 2022-01-01 PROCEDURE — 3072F PR LOW RISK FOR RETINOPATHY: ICD-10-PCS | Mod: CPTII,S$GLB,, | Performed by: COLON & RECTAL SURGERY

## 2022-01-01 PROCEDURE — 1160F PR REVIEW ALL MEDS BY PRESCRIBER/CLIN PHARMACIST DOCUMENTED: ICD-10-PCS | Mod: CPTII,S$GLB,, | Performed by: ANESTHESIOLOGY

## 2022-01-01 PROCEDURE — 1159F MED LIST DOCD IN RCRD: CPT | Mod: CPTII,95,, | Performed by: INTERNAL MEDICINE

## 2022-01-01 PROCEDURE — 99999 PR PBB SHADOW E&M-EST. PATIENT-LVL III: CPT | Mod: PBBFAC,,, | Performed by: COLON & RECTAL SURGERY

## 2022-01-01 PROCEDURE — 3075F PR MOST RECENT SYSTOLIC BLOOD PRESS GE 130-139MM HG: ICD-10-PCS | Mod: CPTII,S$GLB,, | Performed by: FAMILY MEDICINE

## 2022-01-01 PROCEDURE — 1101F PR PT FALLS ASSESS DOC 0-1 FALLS W/OUT INJ PAST YR: ICD-10-PCS | Mod: CPTII,S$GLB,, | Performed by: INTERNAL MEDICINE

## 2022-01-01 PROCEDURE — 3288F PR FALLS RISK ASSESSMENT DOCUMENTED: ICD-10-PCS | Mod: CPTII,S$GLB,, | Performed by: ANESTHESIOLOGY

## 2022-01-01 PROCEDURE — 1159F MED LIST DOCD IN RCRD: CPT | Mod: CPTII,95,, | Performed by: ANESTHESIOLOGY

## 2022-01-01 PROCEDURE — 93294 CARDIAC DEVICE CHECK - REMOTE: ICD-10-PCS | Mod: S$GLB,,, | Performed by: INTERNAL MEDICINE

## 2022-01-01 PROCEDURE — 1125F AMNT PAIN NOTED PAIN PRSNT: CPT | Mod: CPTII,S$GLB,,

## 2022-01-01 PROCEDURE — 1101F PT FALLS ASSESS-DOCD LE1/YR: CPT | Mod: CPTII,S$GLB,, | Performed by: ANESTHESIOLOGY

## 2022-01-01 PROCEDURE — 99204 PR OFFICE/OUTPT VISIT, NEW, LEVL IV, 45-59 MIN: ICD-10-PCS | Mod: 25,S$GLB,, | Performed by: COLON & RECTAL SURGERY

## 2022-01-01 PROCEDURE — 99499 UNLISTED E&M SERVICE: CPT | Mod: 95,,, | Performed by: INTERNAL MEDICINE

## 2022-01-01 PROCEDURE — 1101F PT FALLS ASSESS-DOCD LE1/YR: CPT | Mod: CPTII,S$GLB,, | Performed by: FAMILY MEDICINE

## 2022-01-01 PROCEDURE — 1170F PR FUNCTIONAL STATUS ASSESSED: ICD-10-PCS | Mod: CPTII,S$GLB,, | Performed by: NURSE PRACTITIONER

## 2022-01-01 PROCEDURE — 1160F PR REVIEW ALL MEDS BY PRESCRIBER/CLIN PHARMACIST DOCUMENTED: ICD-10-PCS | Mod: CPTII,95,, | Performed by: INTERNAL MEDICINE

## 2022-01-01 PROCEDURE — 99213 OFFICE O/P EST LOW 20 MIN: CPT | Mod: S$GLB,,, | Performed by: INTERNAL MEDICINE

## 2022-01-01 PROCEDURE — 3078F DIAST BP <80 MM HG: CPT | Mod: CPTII,S$GLB,, | Performed by: ANESTHESIOLOGY

## 2022-01-01 PROCEDURE — 1125F PR PAIN SEVERITY QUANTIFIED, PAIN PRESENT: ICD-10-PCS | Mod: CPTII,S$GLB,,

## 2022-01-01 PROCEDURE — 1159F MED LIST DOCD IN RCRD: CPT | Mod: CPTII,S$GLB,, | Performed by: ANESTHESIOLOGY

## 2022-01-01 PROCEDURE — 3078F DIAST BP <80 MM HG: CPT | Mod: CPTII,S$GLB,, | Performed by: COLON & RECTAL SURGERY

## 2022-01-01 PROCEDURE — 3072F LOW RISK FOR RETINOPATHY: CPT | Mod: CPTII,S$GLB,, | Performed by: COLON & RECTAL SURGERY

## 2022-01-01 PROCEDURE — 1125F PR PAIN SEVERITY QUANTIFIED, PAIN PRESENT: ICD-10-PCS | Mod: CPTII,S$GLB,, | Performed by: ANESTHESIOLOGY

## 2022-01-01 PROCEDURE — 1159F PR MEDICATION LIST DOCUMENTED IN MEDICAL RECORD: ICD-10-PCS | Mod: CPTII,95,, | Performed by: ANESTHESIOLOGY

## 2022-01-01 PROCEDURE — 3075F SYST BP GE 130 - 139MM HG: CPT | Mod: CPTII,S$GLB,, | Performed by: INTERNAL MEDICINE

## 2022-01-01 PROCEDURE — 46600 PR DIAG2STIC A2SCOPY: ICD-10-PCS | Mod: S$GLB,,, | Performed by: COLON & RECTAL SURGERY

## 2022-01-01 PROCEDURE — 99499 UNLISTED E&M SERVICE: CPT | Mod: 95,,,

## 2022-01-01 PROCEDURE — 99499 UNLISTED E&M SERVICE: CPT | Mod: S$GLB,,,

## 2022-01-01 RX ORDER — DIPHENHYDRAMINE HCL 25 MG
25 CAPSULE ORAL
Status: CANCELLED
Start: 2022-01-01

## 2022-01-01 RX ORDER — DIPHENHYDRAMINE HCL 25 MG
25 CAPSULE ORAL
Status: COMPLETED | OUTPATIENT
Start: 2022-01-01 | End: 2022-01-01

## 2022-01-01 RX ORDER — CLONAZEPAM 1 MG/1
1 TABLET ORAL NIGHTLY PRN
Qty: 90 TABLET | Refills: 0 | Status: SHIPPED | OUTPATIENT
Start: 2022-01-01 | End: 2022-01-01 | Stop reason: SDUPTHER

## 2022-01-01 RX ORDER — ACETAMINOPHEN 325 MG/1
650 TABLET ORAL
Status: CANCELLED
Start: 2022-01-01

## 2022-01-01 RX ORDER — SPIRONOLACTONE 25 MG/1
25 TABLET ORAL DAILY
Qty: 90 TABLET | Refills: 3 | Status: SHIPPED | OUTPATIENT
Start: 2022-01-01 | End: 2023-01-01 | Stop reason: SDUPTHER

## 2022-01-01 RX ORDER — ACETAMINOPHEN 325 MG/1
650 TABLET ORAL
Status: COMPLETED | OUTPATIENT
Start: 2022-01-01 | End: 2022-01-01

## 2022-01-01 RX ORDER — METHYLPREDNISOLONE 4 MG/1
TABLET ORAL
Qty: 1 EACH | Refills: 0 | Status: SHIPPED | OUTPATIENT
Start: 2022-01-01 | End: 2022-01-01

## 2022-01-01 RX ORDER — LOSARTAN POTASSIUM 50 MG/1
50 TABLET ORAL DAILY
Qty: 90 TABLET | Refills: 3 | Status: SHIPPED | OUTPATIENT
Start: 2022-01-01 | End: 2023-01-01 | Stop reason: SDUPTHER

## 2022-01-01 RX ORDER — HEPARIN 100 UNIT/ML
500 SYRINGE INTRAVENOUS
Status: CANCELLED | OUTPATIENT
Start: 2022-01-01

## 2022-01-01 RX ORDER — HYDROCODONE BITARTRATE AND ACETAMINOPHEN 500; 5 MG/1; MG/1
TABLET ORAL
Status: DISCONTINUED | OUTPATIENT
Start: 2022-01-01 | End: 2022-01-01 | Stop reason: HOSPADM

## 2022-01-01 RX ORDER — DIPHENHYDRAMINE HCL 25 MG
25 CAPSULE ORAL
Status: CANCELLED | OUTPATIENT
Start: 2022-01-01

## 2022-01-01 RX ORDER — PREGABALIN 225 MG/1
225 CAPSULE ORAL 2 TIMES DAILY
Qty: 60 CAPSULE | Refills: 2 | Status: SHIPPED | OUTPATIENT
Start: 2022-01-01 | End: 2023-01-01 | Stop reason: DRUGHIGH

## 2022-01-01 RX ORDER — HYDROCODONE BITARTRATE AND ACETAMINOPHEN 500; 5 MG/1; MG/1
TABLET ORAL ONCE
Status: COMPLETED | OUTPATIENT
Start: 2022-01-01 | End: 2022-01-01

## 2022-01-01 RX ORDER — ATORVASTATIN CALCIUM 80 MG/1
80 TABLET, FILM COATED ORAL NIGHTLY
Qty: 90 TABLET | Refills: 1 | Status: SHIPPED | OUTPATIENT
Start: 2022-01-01 | End: 2023-01-01 | Stop reason: SDUPTHER

## 2022-01-01 RX ORDER — FUROSEMIDE 40 MG/1
40 TABLET ORAL 2 TIMES DAILY
Qty: 90 TABLET | Refills: 1 | Status: SHIPPED | OUTPATIENT
Start: 2022-01-01 | End: 2023-01-01 | Stop reason: SDUPTHER

## 2022-01-01 RX ORDER — LANOLIN ALCOHOL/MO/W.PET/CERES
400 CREAM (GRAM) TOPICAL DAILY
Qty: 90 TABLET | Refills: 1 | Status: SHIPPED | OUTPATIENT
Start: 2022-01-01 | End: 2023-01-01

## 2022-01-01 RX ORDER — SODIUM CHLORIDE 0.9 % (FLUSH) 0.9 %
10 SYRINGE (ML) INJECTION
Status: CANCELLED | OUTPATIENT
Start: 2022-01-01

## 2022-01-01 RX ORDER — HYDROCODONE BITARTRATE AND ACETAMINOPHEN 500; 5 MG/1; MG/1
TABLET ORAL ONCE
Status: CANCELLED | OUTPATIENT
Start: 2022-01-01 | End: 2022-01-01

## 2022-01-01 RX ORDER — ACETAMINOPHEN 325 MG/1
650 TABLET ORAL
Status: CANCELLED | OUTPATIENT
Start: 2022-01-01

## 2022-01-01 RX ORDER — PYRIDOXINE HCL (VITAMIN B6) 100 MG
50 TABLET ORAL DAILY
COMMUNITY

## 2022-01-01 RX ADMIN — EPOETIN ALFA-EPBX 40000 UNITS: 40000 INJECTION, SOLUTION INTRAVENOUS; SUBCUTANEOUS at 12:07

## 2022-01-01 RX ADMIN — EPOETIN ALFA-EPBX 40000 UNITS: 40000 INJECTION, SOLUTION INTRAVENOUS; SUBCUTANEOUS at 09:08

## 2022-01-01 RX ADMIN — EPOETIN ALFA-EPBX 40000 UNITS: 40000 INJECTION, SOLUTION INTRAVENOUS; SUBCUTANEOUS at 08:08

## 2022-01-01 RX ADMIN — DIPHENHYDRAMINE HYDROCHLORIDE 25 MG: 25 CAPSULE ORAL at 08:08

## 2022-01-01 RX ADMIN — EPOETIN ALFA-EPBX 40000 UNITS: 40000 INJECTION, SOLUTION INTRAVENOUS; SUBCUTANEOUS at 01:07

## 2022-01-01 RX ADMIN — EPOETIN ALFA-EPBX 40000 UNITS: 40000 INJECTION, SOLUTION INTRAVENOUS; SUBCUTANEOUS at 11:09

## 2022-01-01 RX ADMIN — ACETAMINOPHEN 650 MG: 325 TABLET ORAL at 08:08

## 2022-01-01 RX ADMIN — ACETAMINOPHEN 650 MG: 325 TABLET ORAL at 10:07

## 2022-01-01 RX ADMIN — EPOETIN ALFA-EPBX 40000 UNITS: 40000 INJECTION, SOLUTION INTRAVENOUS; SUBCUTANEOUS at 10:09

## 2022-01-01 RX ADMIN — SODIUM CHLORIDE: 9 INJECTION, SOLUTION INTRAVENOUS at 10:09

## 2022-01-01 RX ADMIN — EPOETIN ALFA-EPBX 40000 UNITS: 40000 INJECTION, SOLUTION INTRAVENOUS; SUBCUTANEOUS at 08:07

## 2022-01-01 RX ADMIN — FERRIC CARBOXYMALTOSE INJECTION 750 MG: 50 INJECTION, SOLUTION INTRAVENOUS at 10:09

## 2022-01-01 RX ADMIN — ERYTHROPOIETIN 40000 UNITS: 40000 INJECTION, SOLUTION INTRAVENOUS; SUBCUTANEOUS at 10:09

## 2022-01-01 RX ADMIN — DIPHENHYDRAMINE HYDROCHLORIDE 25 MG: 25 CAPSULE ORAL at 10:07

## 2022-01-01 RX ADMIN — SODIUM CHLORIDE: 0.9 INJECTION, SOLUTION INTRAVENOUS at 08:08

## 2022-01-01 RX ADMIN — EPOETIN ALFA-EPBX 40000 UNITS: 40000 INJECTION, SOLUTION INTRAVENOUS; SUBCUTANEOUS at 02:08

## 2022-01-01 RX ADMIN — EPOETIN ALFA-EPBX 40000 UNITS: 40000 INJECTION, SOLUTION INTRAVENOUS; SUBCUTANEOUS at 01:09

## 2022-01-01 RX ADMIN — EPOETIN ALFA 40000 UNITS: 20000 SOLUTION INTRAVENOUS; SUBCUTANEOUS at 08:09

## 2022-01-01 RX ADMIN — EPOETIN ALFA-EPBX 40000 UNITS: 40000 INJECTION, SOLUTION INTRAVENOUS; SUBCUTANEOUS at 01:08

## 2022-01-01 RX ADMIN — EPOETIN ALFA-EPBX 40000 UNITS: 40000 INJECTION, SOLUTION INTRAVENOUS; SUBCUTANEOUS at 10:06

## 2022-01-01 RX ADMIN — FERRIC CARBOXYMALTOSE INJECTION 750 MG: 50 INJECTION, SOLUTION INTRAVENOUS at 12:09

## 2022-01-03 ENCOUNTER — TELEPHONE (OUTPATIENT)
Dept: CARDIOLOGY | Facility: CLINIC | Age: 82
End: 2022-01-03
Payer: MEDICARE

## 2022-01-03 NOTE — TELEPHONE ENCOUNTER
----- Message from Phillip Hood MD sent at 1/3/2022  1:57 PM CST -----  Please contact the patient and let them know that their labs reveal elevated BNP due to extra fluid, need to double up lasix to 40mg twice a day until breathing is improving and have less swelling/weight, continue monitoring bP and weights daily and low salt diet, follow up with me in 2-3 weeks will adjust meds further. Kidney function, potassium and magnesium are stable.

## 2022-01-04 NOTE — PROVATION PATIENT INSTRUCTIONS
Discharge Summary/Instructions for after Video Capsule Endoscopy  Patient Name: Jake Ortiz  Patient MRN: [id number]  Patient YOB: 1940 Wednesday, December 29, 2021  Morgan Carrasco MD  1.  Do Not eat or drink anything for 1 hour.  Try sips of water first.  If   tolerated, resume your regular diet or one recommended by your physician.  2.  Do not drive, operate machinery, make critical decisions, or do   activities that require coordination or balance for 24 hours.  3.   You may experience a sore throat for 24 to 48 hours.  You may use   throat lozenges or gargle with warm salt water to relieve the discomfort.  4.  Because air was put into your stomach during the procedure, you may   experience some belching.  5.  Do not use any medication containing aspirin for 10 days.  6.  Go directly to the emergency room if you notice any of the following:   Chills and/or fever over 101 F   Persistent vomiting or vomiting with blood/nasal regurgitation   Severe abdominal pain, other than gas cramps   Severe chest pain   Black, tarry stools     Your doctor recommends these additional instructions:  - Discharge patient to home.   - Resume previous diet.   - Continue present medications.  For questions, problems or results please call your physician Morgan Carrasco MD at Work:  [phone]  If you have any questions about the above instructions, call the GI   department at [phone] or call the endoscopy unit at [phone]   from 7am until 3 pm.  [facility] [place], EMERGENCY ROOM PHONE NUMBER:   [phone]  IF A COMPLICATION OR EMERGENCY SITUATION ARISES AND YOU ARE UNABLE TO REACH   YOUR PHYSICIAN - GO DIRECTLY TO THE EMERGENCY ROOM.  I have read or have had read to me these discharge instructions for my   procedure and have received a written copy.  I understand these   instructions and will follow-up with my physician if I have any questions.      __________________________________       _____________________________________  Nurse Signature                                          Patient/Designated   Responsible Party Signature  Morgan Carrasco MD  1/4/2022 3:48:17 PM  This report has been verified and signed electronically.  Dear patient,  As a result of recent federal legislation (The Federal Cures Act), you may   receive lab or pathology results from your procedure in your [facility]   account before your physician is able to contact you. Your physician or   their representative will relay the results to you with their   recommendations at their soonest availability.  Thank you,  PROVATION

## 2022-01-05 ENCOUNTER — OFFICE VISIT (OUTPATIENT)
Dept: HEMATOLOGY/ONCOLOGY | Facility: CLINIC | Age: 82
End: 2022-01-05
Payer: MEDICARE

## 2022-01-05 ENCOUNTER — LAB VISIT (OUTPATIENT)
Dept: LAB | Facility: HOSPITAL | Age: 82
End: 2022-01-05
Attending: INTERNAL MEDICINE
Payer: MEDICARE

## 2022-01-05 VITALS
DIASTOLIC BLOOD PRESSURE: 69 MMHG | BODY MASS INDEX: 29.78 KG/M2 | HEIGHT: 70 IN | WEIGHT: 208 LBS | HEART RATE: 63 BPM | SYSTOLIC BLOOD PRESSURE: 136 MMHG | OXYGEN SATURATION: 98 %

## 2022-01-05 DIAGNOSIS — N18.4 ANEMIA ASSOCIATED WITH STAGE 4 CHRONIC RENAL FAILURE: ICD-10-CM

## 2022-01-05 DIAGNOSIS — N18.4 STAGE 4 CHRONIC KIDNEY DISEASE: ICD-10-CM

## 2022-01-05 DIAGNOSIS — E66.9 DIABETES MELLITUS TYPE 2 IN OBESE: ICD-10-CM

## 2022-01-05 DIAGNOSIS — J43.9 MIXED RESTRICTIVE AND OBSTRUCTIVE LUNG DISEASE: ICD-10-CM

## 2022-01-05 DIAGNOSIS — D50.0 IRON DEFICIENCY ANEMIA DUE TO CHRONIC BLOOD LOSS: Primary | ICD-10-CM

## 2022-01-05 DIAGNOSIS — D63.1 ANEMIA ASSOCIATED WITH STAGE 4 CHRONIC RENAL FAILURE: ICD-10-CM

## 2022-01-05 DIAGNOSIS — I48.0 PAF (PAROXYSMAL ATRIAL FIBRILLATION): ICD-10-CM

## 2022-01-05 DIAGNOSIS — N25.81 SECONDARY HYPERPARATHYROIDISM OF RENAL ORIGIN: ICD-10-CM

## 2022-01-05 DIAGNOSIS — E11.69 DIABETES MELLITUS TYPE 2 IN OBESE: ICD-10-CM

## 2022-01-05 DIAGNOSIS — J98.4 MIXED RESTRICTIVE AND OBSTRUCTIVE LUNG DISEASE: ICD-10-CM

## 2022-01-05 DIAGNOSIS — D71 GRANULOMATOUS DISEASE: ICD-10-CM

## 2022-01-05 DIAGNOSIS — I70.0 ARTERIOSCLEROSIS OF AORTA: ICD-10-CM

## 2022-01-05 DIAGNOSIS — C61 ADENOCARCINOMA OF PROSTATE: ICD-10-CM

## 2022-01-05 DIAGNOSIS — K51.40 PSEUDOPOLYPOSIS OF COLON WITHOUT COMPLICATION, UNSPECIFIED PART OF COLON: ICD-10-CM

## 2022-01-05 LAB
ALBUMIN SERPL BCP-MCNC: 3.9 G/DL (ref 3.5–5.2)
ALP SERPL-CCNC: 77 U/L (ref 55–135)
ALT SERPL W/O P-5'-P-CCNC: 8 U/L (ref 10–44)
ANION GAP SERPL CALC-SCNC: 11 MMOL/L (ref 8–16)
AST SERPL-CCNC: 16 U/L (ref 10–40)
BASOPHILS # BLD AUTO: 0.04 K/UL (ref 0–0.2)
BASOPHILS NFR BLD: 1 % (ref 0–1.9)
BILIRUB SERPL-MCNC: 0.4 MG/DL (ref 0.1–1)
BUN SERPL-MCNC: 16 MG/DL (ref 8–23)
CALCIUM SERPL-MCNC: 9.1 MG/DL (ref 8.7–10.5)
CHLORIDE SERPL-SCNC: 107 MMOL/L (ref 95–110)
CO2 SERPL-SCNC: 26 MMOL/L (ref 23–29)
CREAT SERPL-MCNC: 1.7 MG/DL (ref 0.5–1.4)
DIFFERENTIAL METHOD: ABNORMAL
EOSINOPHIL # BLD AUTO: 0.1 K/UL (ref 0–0.5)
EOSINOPHIL NFR BLD: 3.2 % (ref 0–8)
ERYTHROCYTE [DISTWIDTH] IN BLOOD BY AUTOMATED COUNT: 20.5 % (ref 11.5–14.5)
EST. GFR  (AFRICAN AMERICAN): 43 ML/MIN/1.73 M^2
EST. GFR  (NON AFRICAN AMERICAN): 38 ML/MIN/1.73 M^2
GLUCOSE SERPL-MCNC: 161 MG/DL (ref 70–110)
HCT VFR BLD AUTO: 31.9 % (ref 40–54)
HGB BLD-MCNC: 9.2 G/DL (ref 14–18)
IMM GRANULOCYTES # BLD AUTO: 0.01 K/UL (ref 0–0.04)
IMM GRANULOCYTES NFR BLD AUTO: 0.2 % (ref 0–0.5)
LYMPHOCYTES # BLD AUTO: 0.6 K/UL (ref 1–4.8)
LYMPHOCYTES NFR BLD: 15.5 % (ref 18–48)
MCH RBC QN AUTO: 26.1 PG (ref 27–31)
MCHC RBC AUTO-ENTMCNC: 28.8 G/DL (ref 32–36)
MCV RBC AUTO: 90 FL (ref 82–98)
MONOCYTES # BLD AUTO: 0.5 K/UL (ref 0.3–1)
MONOCYTES NFR BLD: 11.3 % (ref 4–15)
NEUTROPHILS # BLD AUTO: 2.8 K/UL (ref 1.8–7.7)
NEUTROPHILS NFR BLD: 68.8 % (ref 38–73)
NRBC BLD-RTO: 0 /100 WBC
PLATELET # BLD AUTO: 300 K/UL (ref 150–450)
PMV BLD AUTO: 9.4 FL (ref 9.2–12.9)
POTASSIUM SERPL-SCNC: 3.4 MMOL/L (ref 3.5–5.1)
PROT SERPL-MCNC: 7.3 G/DL (ref 6–8.4)
RBC # BLD AUTO: 3.53 M/UL (ref 4.6–6.2)
SODIUM SERPL-SCNC: 144 MMOL/L (ref 136–145)
WBC # BLD AUTO: 4.06 K/UL (ref 3.9–12.7)

## 2022-01-05 PROCEDURE — 3078F PR MOST RECENT DIASTOLIC BLOOD PRESSURE < 80 MM HG: ICD-10-PCS | Mod: HCNC,CPTII,95, | Performed by: INTERNAL MEDICINE

## 2022-01-05 PROCEDURE — 3072F LOW RISK FOR RETINOPATHY: CPT | Mod: HCNC,CPTII,95, | Performed by: INTERNAL MEDICINE

## 2022-01-05 PROCEDURE — 99499 RISK ADDL DX/OHS AUDIT: ICD-10-PCS | Mod: 95,,, | Performed by: INTERNAL MEDICINE

## 2022-01-05 PROCEDURE — 36415 COLL VENOUS BLD VENIPUNCTURE: CPT | Mod: HCNC | Performed by: INTERNAL MEDICINE

## 2022-01-05 PROCEDURE — 3075F SYST BP GE 130 - 139MM HG: CPT | Mod: HCNC,CPTII,95, | Performed by: INTERNAL MEDICINE

## 2022-01-05 PROCEDURE — 1126F PR PAIN SEVERITY QUANTIFIED, NO PAIN PRESENT: ICD-10-PCS | Mod: HCNC,CPTII,95, | Performed by: INTERNAL MEDICINE

## 2022-01-05 PROCEDURE — 3075F PR MOST RECENT SYSTOLIC BLOOD PRESS GE 130-139MM HG: ICD-10-PCS | Mod: HCNC,CPTII,95, | Performed by: INTERNAL MEDICINE

## 2022-01-05 PROCEDURE — 3072F PR LOW RISK FOR RETINOPATHY: ICD-10-PCS | Mod: HCNC,CPTII,95, | Performed by: INTERNAL MEDICINE

## 2022-01-05 PROCEDURE — 85025 COMPLETE CBC W/AUTO DIFF WBC: CPT | Mod: HCNC | Performed by: INTERNAL MEDICINE

## 2022-01-05 PROCEDURE — 99214 PR OFFICE/OUTPT VISIT, EST, LEVL IV, 30-39 MIN: ICD-10-PCS | Mod: HCNC,95,, | Performed by: INTERNAL MEDICINE

## 2022-01-05 PROCEDURE — 80053 COMPREHEN METABOLIC PANEL: CPT | Mod: HCNC | Performed by: INTERNAL MEDICINE

## 2022-01-05 PROCEDURE — 99214 OFFICE O/P EST MOD 30 MIN: CPT | Mod: HCNC,95,, | Performed by: INTERNAL MEDICINE

## 2022-01-05 PROCEDURE — 99499 UNLISTED E&M SERVICE: CPT | Mod: 95,,, | Performed by: INTERNAL MEDICINE

## 2022-01-05 PROCEDURE — 1126F AMNT PAIN NOTED NONE PRSNT: CPT | Mod: HCNC,CPTII,95, | Performed by: INTERNAL MEDICINE

## 2022-01-05 PROCEDURE — 3078F DIAST BP <80 MM HG: CPT | Mod: HCNC,CPTII,95, | Performed by: INTERNAL MEDICINE

## 2022-01-05 RX ORDER — SODIUM CHLORIDE 0.9 % (FLUSH) 0.9 %
10 SYRINGE (ML) INJECTION
Status: CANCELLED | OUTPATIENT
Start: 2022-01-05

## 2022-01-05 RX ORDER — HEPARIN 100 UNIT/ML
500 SYRINGE INTRAVENOUS
Status: CANCELLED | OUTPATIENT
Start: 2022-01-05

## 2022-01-05 RX ORDER — SODIUM CHLORIDE 0.9 % (FLUSH) 0.9 %
10 SYRINGE (ML) INJECTION
Status: CANCELLED | OUTPATIENT
Start: 2022-01-14

## 2022-01-05 RX ORDER — HEPARIN 100 UNIT/ML
500 SYRINGE INTRAVENOUS
Status: CANCELLED | OUTPATIENT
Start: 2022-01-14

## 2022-01-05 NOTE — PROGRESS NOTES
Subjective:       Patient ID: Jake Ortiz is a 81 y.o. male.    Chief Complaint: Anemia, Results, Chronic Renal Failure, and Prostate Cancer    HPI 81-year-old male history of anemia secondary to chronic renal failure recurrent iron deficiency anemia secondary to GI blood loss extensive evaluation upper lower endoscopies completed.  Patient recently hospitalized and transfused.  Documented iron deficiency ECOG status 2 seen in video visit consultation    Past Medical History:   Diagnosis Date    Abnormal PFT     Anemia     Arthritis     Atrial fibrillation 10/19/2017    Back pain     Congestive heart failure 3/5/2018    Coronary artery disease     DM (diabetes mellitus)      am 2020    DM (diabetes mellitus)      am 2021    Hyperlipemia     Hypertension     Myocardial infarction     Obesity     NASREEN (obstructive sleep apnea) 2018    Prostate cancer 2015    Tobacco dependence     Type 2 diabetes mellitus      Family History   Problem Relation Age of Onset    Heart attack Mother     Diabetes Mother     Heart disease Mother     Cataracts Mother     Stroke Father     Heart disease Father     Heart disease Brother      Social History     Socioeconomic History    Marital status:     Number of children: 0   Occupational History    Occupation: retired     Employer: [employer]   Tobacco Use    Smoking status: Former Smoker     Packs/day: 1.50     Years: 20.00     Pack years: 30.00     Types: Cigarettes     Start date:      Quit date:      Years since quittin.0    Smokeless tobacco: Never Used   Substance and Sexual Activity    Alcohol use: No    Drug use: No    Sexual activity: Not Currently     Social Determinants of Health     Financial Resource Strain: Low Risk     Difficulty of Paying Living Expenses: Not hard at all   Food Insecurity: No Food Insecurity    Worried About Running Out of Food in the Last Year: Never true     Ran Out of Food in the Last Year: Never true   Transportation Needs: No Transportation Needs    Lack of Transportation (Medical): No    Lack of Transportation (Non-Medical): No   Physical Activity: Inactive    Days of Exercise per Week: 0 days    Minutes of Exercise per Session: 0 min   Stress: No Stress Concern Present    Feeling of Stress : Not at all   Social Connections: Moderately Integrated    Frequency of Communication with Friends and Family: More than three times a week    Frequency of Social Gatherings with Friends and Family: More than three times a week    Attends Bahai Services: Never    Active Member of Clubs or Organizations: Yes    Attends Club or Organization Meetings: More than 4 times per year    Marital Status:    Housing Stability: Low Risk     Unable to Pay for Housing in the Last Year: No    Number of Places Lived in the Last Year: 1    Unstable Housing in the Last Year: No     Past Surgical History:   Procedure Laterality Date    COLONOSCOPY N/A 9/22/2020    Procedure: COLONOSCOPY;  Surgeon: Javier Farmer MD;  Location: [facility];  Service: Endoscopy;  Laterality: N/A;    CORONARY ARTERY BYPASS GRAFT  1987    EPIDURAL STEROID INJECTION N/A 11/22/2019    Procedure: Lumbar L5/S1 IL XUAN;  Surgeon: Tacho Trivedi MD;  Location: [facility] PAIN [facility]GT;  Service: Pain Management;  Laterality: N/A;    EPIDURAL STEROID INJECTION N/A 1/6/2020    Procedure: Lumbar L5/S1 IL XUAN;  Surgeon: Tacho Trivedi MD;  Location: [facility] PAIN [facility]GT;  Service: Pain Management;  Laterality: N/A;    EPIDURAL STEROID INJECTION N/A 2/10/2020    Procedure: Caudal XUAN;  Surgeon: Tacho Trivedi MD;  Location: [facility];  Service: Pain Management;  Laterality: N/A;    ESOPHAGOGASTRODUODENOSCOPY N/A 9/22/2020    Procedure: EGD (ESOPHAGOGASTRODUODENOSCOPY);  Surgeon: Javier Farmer MD;  Location: [facility];  Service: Endoscopy;  Laterality: N/A;    INJECTION OF ANESTHETIC  AGENT AROUND MEDIAL BRANCH NERVES INNERVATING LUMBAR FACET JOINT Bilateral 10/12/2020    Procedure: Bilateral L3-5 MBB;  Surgeon: Tacho Trivedi MD;  [facility];  Service: Pain Management;  Laterality: Bilateral;    INJECTION OF ANESTHETIC AGENT AROUND MEDIAL BRANCH NERVES INNERVATING LUMBAR FACET JOINT Bilateral 12/21/2020    Procedure: Bilateral L3-5 MBB with steroid;  Surgeon: Tacho Trivedi MD;  Location: [facility]IN [facility]GT;  Service: Pain Management;  Laterality: Bilateral;    INTRALUMINAL GASTROINTESTINAL TRACT IMAGING VIA CAPSULE N/A 12/29/2021    Procedure: IMAGING PROCEDURE, GI TRACT, INTRALUMINAL, VIA CAPSULE;  Surgeon: Tahir Geronimo RN;  Location: [facility] ENDO;  Service: Endoscopy;  Laterality: N/A;    PROSTATE SURGERY      prostate radiation    RADIOFREQUENCY THERMOCOAGULATION Left 6/4/2021    Procedure: Left L3-5 Lumbar RFA;  Surgeon: aTcho Trivedi MD;  Location: [facility] PAIN [facility]GT;  Service: Pain Management;  Laterality: Left;    RADIOFREQUENCY THERMOCOAGULATION Right 6/18/2021    Procedure: Right L3-5 Lumbar RFA;  Surgeon: Tacho Trivedi MD;  Location: [facility]IN [facility];  Service: Pain Management;  Laterality: Right;    SPINE SURGERY      fusion    TONSILLECTOMY         Labs:  Lab Results   Component Value Date    WBC 4.06 01/05/2022    HGB 9.2 (L) 01/05/2022    HCT 31.9 (L) 01/05/2022    MCV 90 01/05/2022     01/05/2022     BMP  Lab Results   Component Value Date     (H) 12/30/2021    K 4.1 12/30/2021     12/30/2021    CO2 25 12/30/2021    BUN 15 12/30/2021    CREATININE 1.6 (H) 12/30/2021    CALCIUM 9.3 12/30/2021    ANIONGAP 11 12/30/2021    ESTGFRAFRICA 47 (A) 12/30/2021    EGFRNONAA 40 (A) 12/30/2021     Lab Results   Component Value Date    ALT 18 12/22/2021    AST 26 12/22/2021    GGT 58 (H) 08/08/2018    ALKPHOS 78 12/22/2021    BILITOT 0.5 12/22/2021       Lab Results   Component Value Date    IRON 12 (L) 12/22/2021    TIBC 454 (H) 12/22/2021    FERRITIN 15  (L) 12/22/2021     Lab Results   Component Value Date    UTFYBSPY65 458 07/07/2021     Lab Results   Component Value Date    FOLATE 18.2 07/07/2021     Lab Results   Component Value Date    TSH 1.268 11/04/2020         Review of Systems   Constitutional: Positive for fatigue.   Neurological: Positive for weakness.   Psychiatric/Behavioral: Positive for dysphoric mood. The patient is nervous/anxious.        Objective:      Physical Exam  Constitutional:       Appearance: He is ill-appearing.             Assessment:      1. Iron deficiency anemia due to chronic blood loss    2. 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 Stage 4 chronic kidney disease    4. Adenocarcinoma of prostate    5. Diabetes mellitus type 2 in obese    6. Secondary hyperparathyroidism of renal origin    7. Pseudopolyposis of colon without complication, unspecified part of colon    8. Granulomatous disease    9. PAF (paroxysmal atrial fibrillation)    10. Arteriosclerosis of aorta    11. Mixed restrictive and obstructive lung disease           Plan:       The patient location is:  home  The chief complaint leading to consultation is:  Anemia  Visit type:  Synchronous audio video    Face to Face time with patient: 25 minutes of total time spent on the encounter, which includes face to face time and non-face to face time preparing to see the patient (eg, review of tests), Obtaining and/or reviewing separately obtained history, Documenting clinical information in the electronic or other health record, Independently interpreting results (not separately reported) and communicating results to the patient/family/caregiver, or Care coordination (not separately reported).         Each patient to whom he or she provides medical services by telemedicine is:  (1) informed of the relationship between the physician and patient and the respective role of any other health care provider with respect to management of the patient; and (2) notified  that he or she may decline to receive medical services by telemedicine and may withdraw from such care at any time.    Notes:     Reviewed most recent hospitalization documented iron deficiency would recommend treatment with intravenous iron orders have been placed recommend follow-up 1 month with CBC iron status BMP and PSA prior.  Can be seen and evaluated for follow-up with anemia secondary renal failure recurrent iron deficiency anemia by nurse practitioner/APAP try to maintain hemoglobin between 10 and 11 g  Tristin Nath Jr, MD FACP

## 2022-01-10 ENCOUNTER — OUTPATIENT CASE MANAGEMENT (OUTPATIENT)
Dept: ADMINISTRATIVE | Facility: OTHER | Age: 82
End: 2022-01-10
Payer: MEDICARE

## 2022-01-10 NOTE — PROGRESS NOTES
Outpatient Care Management  Plan of Care Follow Up Visit    Patient: Jake Ortiz  MRN: [id number]  Date of Service: 01/10/2022  Completed by: Peggy Joseph RN  Referral Date: 12/22/2021  Program: Case Management (High Risk)    Reason for Visit   Patient presents with    OPCM RN First Follow-Up Attempt     01/10/22    OPCM RN Second Follow-Up Attempt     01/18/22-Letter thru [facility] portal     Update Plan Of Care     01/25/22       Brief Summary: 01/10/22- Attempt to follow up with patient for outpatient case management. No answer. Left message requesting call back. RN OPCM 1st follow up attempt.  01/18/22-Attempt to follow up with  patient for outpatient case management. No answer. Left message requesting call back. RN DARIAM 2'nd follow up attempt. Letter thru [facility] portal.  01/25/22-Called and spoke to patient.  He has an appt with his cardiologist on 01/26/22 and will discuss with him being dizzy on and off since Saturday. Iron infusion on 01/20/22.     CM ACTION PLAN:Follow up in two weeks per request   Review s/s of anemia and foods high in iron  Refer to pharmacy assistance for Eliquis-$131.00 every 3 months    Patient Summary     Involvement of Care:  Do I have permission to speak with other family members about your care?       Patient Reported Labs & Vitals:  1.  Any Patient Reported Labs & Vitals?     2.  Patient Reported Blood Pressure:     3.  Patient Reported Pulse:     4.  Patient Reported Weight (Kg):     5.  Patient Reported Blood Glucose (mg/dl):       Medical and social history was reviewed with patient and/or caregiver.     Clinical Assessment     Reviewed and provided basic information on available community resources for mental health, transportation, wellness resources, and palliative care programs with patient and/or caregiver.     Complex Care Plan     Care plan was discussed and completed today with input from patient and/or caregiver.    Patient Instructions     Instructions were  provided via the [facility] patient resources and are available for the patient to view on the patient portal.    Follow up in about 14 days (around 2/8/2022) for RN Follow up call.    Todays OPCM Self-Management Care Plan was developed with the patients/caregivers input and was based on identified barriers from todays assessment.  Goals were written today with the patient/caregiver and the patient has agreed to work towards these goals to improve his/her overall well-being. Patient verbalized understanding of the care plan, goals, and all of today's instructions. Encouraged patient/caregiver to communicate with his/her physician and health care team about health conditions and the treatment plan.  Provided my contact information today and encouraged patient/caregiver to call me with any questions as needed.            :

## 2022-01-11 ENCOUNTER — PATIENT MESSAGE (OUTPATIENT)
Dept: CARDIOLOGY | Facility: CLINIC | Age: 82
End: 2022-01-11
Payer: MEDICARE

## 2022-01-11 DIAGNOSIS — I48.0 PAF (PAROXYSMAL ATRIAL FIBRILLATION): ICD-10-CM

## 2022-01-13 ENCOUNTER — INFUSION (OUTPATIENT)
Dept: INFUSION THERAPY | Facility: HOSPITAL | Age: 82
End: 2022-01-13
Attending: INTERNAL MEDICINE
Payer: MEDICARE

## 2022-01-13 ENCOUNTER — PATIENT MESSAGE (OUTPATIENT)
Dept: CARDIOLOGY | Facility: CLINIC | Age: 82
End: 2022-01-13
Payer: MEDICARE

## 2022-01-13 VITALS
OXYGEN SATURATION: 98 % | TEMPERATURE: 97 F | HEART RATE: 61 BPM | DIASTOLIC BLOOD PRESSURE: 74 MMHG | RESPIRATION RATE: 16 BRPM | SYSTOLIC BLOOD PRESSURE: 142 MMHG

## 2022-01-13 DIAGNOSIS — D50.0 IRON DEFICIENCY ANEMIA DUE TO CHRONIC BLOOD LOSS: Primary | ICD-10-CM

## 2022-01-13 PROCEDURE — 25000003 PHARM REV CODE 250: Mod: HCNC | Performed by: INTERNAL MEDICINE

## 2022-01-13 PROCEDURE — 96365 THER/PROPH/DIAG IV INF INIT: CPT | Mod: HCNC

## 2022-01-13 PROCEDURE — 63600175 PHARM REV CODE 636 W HCPCS: Mod: JG,HCNC | Performed by: INTERNAL MEDICINE

## 2022-01-13 RX ADMIN — FERRIC CARBOXYMALTOSE INJECTION 750 MG: 50 INJECTION, SOLUTION INTRAVENOUS at 11:01

## 2022-01-13 NOTE — PLAN OF CARE
Patient tolerated Injectafer well today; no adverse reaction noted.  No significant complaints voiced.  IV discontinued with catheter intact and pressure dressing applied to the site.  Has f/u appt(s) scheduled per MD request.  No questions or concerns voiced.  NAD noted upon discharge.

## 2022-01-13 NOTE — DISCHARGE INSTRUCTIONS
[facility]  [address] [facility]  [address] [address]  [phone] phone     [phone] fax  Hours of Operation: Monday- Friday 8:00am- 5:00pm  After hours phone  [phone]  Hematology / Oncology Physicians on call    Dr. Portillo George      Nurse Practitioners:    Natalie Ying, NEGIN Mckay NP      Please don't hesitate to call if you have any concerns.    WAYS TO HELP PREVENT INFECTION         WASH YOUR HANDS OFTEN DURING THE DAY, ESPECIALLY BEFORE YOU EAT, AFTER USING THE BATHROOM, AND AFTER TOUCHING ANIMALS     STAY AWAY FROM PEOPLE WHO HAVE ILLNESSES YOU CAN CATCH; SUCH AS COLDS, FLU, CHICKEN POX     TRY TO AVOID CROWDS     STAY AWAY FROM CHILDREN WHO RECENTLY HAVE RECEIVED LIVE VIRUS VACCINES     MAINTAIN GOOD MOUTH CARE     DO NOT SQUEEZE OR SCRATCH PIMPLES     CLEAN CUTS & SCRAPES RIGHT AWAY AND DAILY UNTIL HEALED WITH WARM WATER, SOAP & AN ANTISEPTIC     AVOID CONTACT WITH LITTER BOXES, BIRD CAGES, & FISH TANKS     AVOID STANDING WATER, IE., BIRD BATHS, FLOWER POTS/VASES, OR HUMIDIFIERS     WEAR GLOVES WHEN GARDENING OR CLEANING UP AFTER OTHERS, ESPECIALLY BABIES & SMALL CHILDREN     DO NOT EAT RAW FISH, SEAFOOD, MEAT, OR EGGS    FALL PREVENTION   Falls often occur due to slipping, tripping or losing your balance. Here are ways to reduce your risk of falling again.   Was there anything that caused your fall that can be fixed, removed or replaced?   Make your home safe by keeping walkways clear of objects you may trip over.   Use non-slip pads under rugs.   Do not walk in poorly lit areas.   Do not stand on chairs or wobbly ladders.   Use caution when reaching overhead or looking upward. This position can cause a loss of balance.   Be sure your shoes fit properly, have non-slip bottoms and are in good condition.   Be cautious when going up and down stairs, curbs, and when walking on uneven sidewalks.   If your balance is  poor, consider using a cane or walker.   If your fall was related to alcohol use, stop or limit alcohol intake.   If your fall was related to use of sleeping medicines, talk to your doctor about this. You may need to reduce your dosage at bedtime if you awaken during the night to go to the bathroom.   To reduce the need for nighttime bathroom trips:   Avoid drinking fluids for several hours before going to bed   Empty your bladder before going to bed   Men can keep a urinal at the bedside   © 9682-7075 [facility], [address] [place] [address], [place] [id number]. All rights reserved. This information is not intended as a substitute for professional medical care. Always follow your healthcare professional's instructions.    Moderna and Pfizer vaccine booster shots are approved for adults at increased risk at least six months after their initial immunization and Davy & Davy (J&J) COVID-19 vaccine booster shots are approved for all adults at least two months after their initial immunization.    Booster doses for all three COVID-19 vaccines, Pfizer, Moderna and Davy & Davy, are now available to the public and are being administered at [facility] vaccination locations.     If you received an mRNA vaccine (Pfizer or Moderna) it is recommended that you receive the same booster dose as your original vaccine series. However, all patients eligible for a booster dose may decide to mix and match your booster dose.     Below are the current mix and match options [facility] currently offers:    Pfizer Vaccine Recipients:   Booster Dose 6 months following 2nd dose can be, in order of recommendation:  o Pfizer Booster Dose,  o Moderna Booster Dose, or  o Davy & Davy Booster Dose    Davy & Davy Vaccine Recipients:    Booster Dose 2 months following initial dose can be, in order of recommendation:  o Pfizer Booster Dose,  o Moderna Booster Dose, or  o Davy & Davy Booster Dose    Both Pfizer  and Davy & Davy boosters have the same dosage as the original vaccine regimen.    Moderna Vaccine Recipients:    Booster Dose 6 months following 2nd dose can be, in order of recommendation:  o Moderna Booster Dose,  o Pfizer Booster Dose, or  o Davy & Davy Booster Dose    The Moderna booster is half the dosage of the original two-dose Moderna series, and [facility] will be following this guidance as outlined by the FDA and CDC.    International patients who have received a non-[place] approved COVID-19 vaccine are eligible for either a Pfizer booster dose or a Davy & Davy booster dose 28 days following their original vaccine dose.     Please schedule your appointment via [facility] or by calling [phone]. Walk-ins will also be accepted as supply allows.    To learn more about COVID-19 vaccination and community vaccine locations, please visit [email or web address]         0

## 2022-01-13 NOTE — NURSING
Patient stayed 45 minutes post infusion with no issues.  Rescheduled next infusion to [facility] d/t location to home.

## 2022-01-16 ENCOUNTER — CLINICAL SUPPORT (OUTPATIENT)
Dept: CARDIOLOGY | Facility: HOSPITAL | Age: 82
End: 2022-01-16
Payer: MEDICARE

## 2022-01-16 DIAGNOSIS — Z95.0 PRESENCE OF CARDIAC PACEMAKER: ICD-10-CM

## 2022-01-16 PROCEDURE — 93296 REM INTERROG EVL PM/IDS: CPT | Mod: HCNC | Performed by: INTERNAL MEDICINE

## 2022-01-16 PROCEDURE — 93294 CARDIAC DEVICE CHECK - REMOTE: ICD-10-PCS | Mod: HCNC,S$GLB,, | Performed by: INTERNAL MEDICINE

## 2022-01-16 PROCEDURE — 93294 REM INTERROG EVL PM/LDLS PM: CPT | Mod: HCNC,S$GLB,, | Performed by: INTERNAL MEDICINE

## 2022-01-18 ENCOUNTER — PATIENT MESSAGE (OUTPATIENT)
Dept: ADMINISTRATIVE | Facility: OTHER | Age: 82
End: 2022-01-18
Payer: MEDICARE

## 2022-01-20 ENCOUNTER — OFFICE VISIT (OUTPATIENT)
Dept: CARDIOLOGY | Facility: CLINIC | Age: 82
End: 2022-01-20
Payer: MEDICARE

## 2022-01-20 ENCOUNTER — INFUSION (OUTPATIENT)
Dept: INFUSION THERAPY | Facility: HOSPITAL | Age: 82
End: 2022-01-20
Attending: INTERNAL MEDICINE
Payer: MEDICARE

## 2022-01-20 ENCOUNTER — PATIENT MESSAGE (OUTPATIENT)
Dept: CARDIOLOGY | Facility: CLINIC | Age: 82
End: 2022-01-20
Payer: MEDICARE

## 2022-01-20 ENCOUNTER — TELEPHONE (OUTPATIENT)
Dept: CARDIOLOGY | Facility: CLINIC | Age: 82
End: 2022-01-20
Payer: MEDICARE

## 2022-01-20 VITALS
HEIGHT: 69 IN | WEIGHT: 206.56 LBS | OXYGEN SATURATION: 97 % | SYSTOLIC BLOOD PRESSURE: 126 MMHG | DIASTOLIC BLOOD PRESSURE: 62 MMHG | RESPIRATION RATE: 16 BRPM | BODY MASS INDEX: 30.59 KG/M2 | HEART RATE: 85 BPM

## 2022-01-20 VITALS
SYSTOLIC BLOOD PRESSURE: 129 MMHG | OXYGEN SATURATION: 99 % | TEMPERATURE: 97 F | HEART RATE: 60 BPM | DIASTOLIC BLOOD PRESSURE: 61 MMHG | RESPIRATION RATE: 16 BRPM

## 2022-01-20 DIAGNOSIS — I48.0 PAF (PAROXYSMAL ATRIAL FIBRILLATION): Primary | ICD-10-CM

## 2022-01-20 DIAGNOSIS — I25.2 MI, OLD: ICD-10-CM

## 2022-01-20 DIAGNOSIS — N18.4 ANEMIA ASSOCIATED WITH STAGE 4 CHRONIC RENAL FAILURE: ICD-10-CM

## 2022-01-20 DIAGNOSIS — I70.0 ARTERIOSCLEROSIS OF AORTA: ICD-10-CM

## 2022-01-20 DIAGNOSIS — Z95.1 S/P CABG X 3: ICD-10-CM

## 2022-01-20 DIAGNOSIS — E66.9 DIABETES MELLITUS TYPE 2 IN OBESE: ICD-10-CM

## 2022-01-20 DIAGNOSIS — E11.69 DIABETES MELLITUS TYPE 2 IN OBESE: ICD-10-CM

## 2022-01-20 DIAGNOSIS — I25.118 CORONARY ARTERY DISEASE OF NATIVE ARTERY OF NATIVE HEART WITH STABLE ANGINA PECTORIS: ICD-10-CM

## 2022-01-20 DIAGNOSIS — E83.42 HYPOMAGNESEMIA: Primary | ICD-10-CM

## 2022-01-20 DIAGNOSIS — D50.0 IRON DEFICIENCY ANEMIA DUE TO CHRONIC BLOOD LOSS: ICD-10-CM

## 2022-01-20 DIAGNOSIS — I10 ESSENTIAL HYPERTENSION: ICD-10-CM

## 2022-01-20 DIAGNOSIS — N18.4 STAGE 4 CHRONIC KIDNEY DISEASE: ICD-10-CM

## 2022-01-20 DIAGNOSIS — I10 PRIMARY HYPERTENSION: ICD-10-CM

## 2022-01-20 DIAGNOSIS — G47.33 OBSTRUCTIVE SLEEP APNEA: ICD-10-CM

## 2022-01-20 DIAGNOSIS — D63.1 ANEMIA ASSOCIATED WITH STAGE 4 CHRONIC RENAL FAILURE: ICD-10-CM

## 2022-01-20 DIAGNOSIS — I48.0 PAROXYSMAL ATRIAL FIBRILLATION: ICD-10-CM

## 2022-01-20 DIAGNOSIS — D50.0 IRON DEFICIENCY ANEMIA DUE TO CHRONIC BLOOD LOSS: Primary | ICD-10-CM

## 2022-01-20 DIAGNOSIS — I50.32 CHRONIC DIASTOLIC CONGESTIVE HEART FAILURE: ICD-10-CM

## 2022-01-20 PROCEDURE — 1160F PR REVIEW ALL MEDS BY PRESCRIBER/CLIN PHARMACIST DOCUMENTED: ICD-10-PCS | Mod: HCNC,CPTII,S$GLB, | Performed by: INTERNAL MEDICINE

## 2022-01-20 PROCEDURE — 1160F RVW MEDS BY RX/DR IN RCRD: CPT | Mod: HCNC,CPTII,S$GLB, | Performed by: INTERNAL MEDICINE

## 2022-01-20 PROCEDURE — 3074F PR MOST RECENT SYSTOLIC BLOOD PRESSURE < 130 MM HG: ICD-10-PCS | Mod: HCNC,CPTII,S$GLB, | Performed by: INTERNAL MEDICINE

## 2022-01-20 PROCEDURE — 3074F SYST BP LT 130 MM HG: CPT | Mod: HCNC,CPTII,S$GLB, | Performed by: INTERNAL MEDICINE

## 2022-01-20 PROCEDURE — 1126F PR PAIN SEVERITY QUANTIFIED, NO PAIN PRESENT: ICD-10-PCS | Mod: HCNC,CPTII,S$GLB, | Performed by: INTERNAL MEDICINE

## 2022-01-20 PROCEDURE — 3078F DIAST BP <80 MM HG: CPT | Mod: HCNC,CPTII,S$GLB, | Performed by: INTERNAL MEDICINE

## 2022-01-20 PROCEDURE — 3288F PR FALLS RISK ASSESSMENT DOCUMENTED: ICD-10-PCS | Mod: HCNC,CPTII,S$GLB, | Performed by: INTERNAL MEDICINE

## 2022-01-20 PROCEDURE — 99214 PR OFFICE/OUTPT VISIT, EST, LEVL IV, 30-39 MIN: ICD-10-PCS | Mod: HCNC,S$GLB,, | Performed by: INTERNAL MEDICINE

## 2022-01-20 PROCEDURE — 3072F PR LOW RISK FOR RETINOPATHY: ICD-10-PCS | Mod: HCNC,CPTII,S$GLB, | Performed by: INTERNAL MEDICINE

## 2022-01-20 PROCEDURE — 1126F AMNT PAIN NOTED NONE PRSNT: CPT | Mod: HCNC,CPTII,S$GLB, | Performed by: INTERNAL MEDICINE

## 2022-01-20 PROCEDURE — 99499 UNLISTED E&M SERVICE: CPT | Mod: S$GLB,,, | Performed by: INTERNAL MEDICINE

## 2022-01-20 PROCEDURE — 3072F LOW RISK FOR RETINOPATHY: CPT | Mod: HCNC,CPTII,S$GLB, | Performed by: INTERNAL MEDICINE

## 2022-01-20 PROCEDURE — 99999 PR PBB SHADOW E&M-EST. PATIENT-LVL V: ICD-10-PCS | Mod: PBBFAC,HCNC,, | Performed by: INTERNAL MEDICINE

## 2022-01-20 PROCEDURE — 99499 RISK ADDL DX/OHS AUDIT: ICD-10-PCS | Mod: S$GLB,,, | Performed by: INTERNAL MEDICINE

## 2022-01-20 PROCEDURE — 63600175 PHARM REV CODE 636 W HCPCS: Mod: JG,HCNC | Performed by: INTERNAL MEDICINE

## 2022-01-20 PROCEDURE — 99999 PR PBB SHADOW E&M-EST. PATIENT-LVL V: CPT | Mod: PBBFAC,HCNC,, | Performed by: INTERNAL MEDICINE

## 2022-01-20 PROCEDURE — 99214 OFFICE O/P EST MOD 30 MIN: CPT | Mod: HCNC,S$GLB,, | Performed by: INTERNAL MEDICINE

## 2022-01-20 PROCEDURE — 3078F PR MOST RECENT DIASTOLIC BLOOD PRESSURE < 80 MM HG: ICD-10-PCS | Mod: HCNC,CPTII,S$GLB, | Performed by: INTERNAL MEDICINE

## 2022-01-20 PROCEDURE — 1101F PR PT FALLS ASSESS DOC 0-1 FALLS W/OUT INJ PAST YR: ICD-10-PCS | Mod: HCNC,CPTII,S$GLB, | Performed by: INTERNAL MEDICINE

## 2022-01-20 PROCEDURE — 1159F MED LIST DOCD IN RCRD: CPT | Mod: HCNC,CPTII,S$GLB, | Performed by: INTERNAL MEDICINE

## 2022-01-20 PROCEDURE — 3288F FALL RISK ASSESSMENT DOCD: CPT | Mod: HCNC,CPTII,S$GLB, | Performed by: INTERNAL MEDICINE

## 2022-01-20 PROCEDURE — 25000003 PHARM REV CODE 250: Mod: HCNC | Performed by: INTERNAL MEDICINE

## 2022-01-20 PROCEDURE — 96365 THER/PROPH/DIAG IV INF INIT: CPT | Mod: HCNC

## 2022-01-20 PROCEDURE — 1159F PR MEDICATION LIST DOCUMENTED IN MEDICAL RECORD: ICD-10-PCS | Mod: HCNC,CPTII,S$GLB, | Performed by: INTERNAL MEDICINE

## 2022-01-20 PROCEDURE — 1101F PT FALLS ASSESS-DOCD LE1/YR: CPT | Mod: HCNC,CPTII,S$GLB, | Performed by: INTERNAL MEDICINE

## 2022-01-20 RX ORDER — LANOLIN ALCOHOL/MO/W.PET/CERES
400 CREAM (GRAM) TOPICAL DAILY
Qty: 90 TABLET | Refills: 1 | Status: SHIPPED | OUTPATIENT
Start: 2022-01-20 | End: 2022-01-01 | Stop reason: SDUPTHER

## 2022-01-20 RX ORDER — POTASSIUM CHLORIDE 20 MEQ/1
20 TABLET, EXTENDED RELEASE ORAL DAILY
Qty: 90 TABLET | Refills: 1 | Status: SHIPPED | OUTPATIENT
Start: 2022-01-20 | End: 2022-05-17 | Stop reason: SDUPTHER

## 2022-01-20 RX ADMIN — FERRIC CARBOXYMALTOSE INJECTION 750 MG: 50 INJECTION, SOLUTION INTRAVENOUS at 10:01

## 2022-01-20 RX ADMIN — SODIUM CHLORIDE: 9 INJECTION, SOLUTION INTRAVENOUS at 10:01

## 2022-01-20 NOTE — PROGRESS NOTES
Subjective:   Patient ID:  Jake Ortiz is a 81 y.o. male who presents for cardiac consult of No chief complaint on file.      Follow-up  Pertinent negatives include no chest pain.     The patient came in today for cardiac consult of No chief complaint on file.    Jake Ortiz is a 81 y.o. male pt with CAD s/p CABG, old MI, HFPEF with TR/MR, AVB s/p CRT, PAF on Eliquis,  Anemia, HTN, HLD, DM2, NASREEN, CKD4, GERD, Restrictive/obstructive lung disease here for follow up CV care.    4/21/20  Pt seen at [facility] last by CARMELINA Spencer 9/18/19. Overall feels great for the most part. He has been started to gain some water weight in the past, he had been on Entreso by Dr. Menendez, lost 173 lbs. He had paracentesis in past was told due to CHF. His weight started coming back up to 208 lbs and then was gaining weight and he has doubled his meds - Entresto. Otherwise feels well has good energy. He changed here due to insurance.   BP - 108/67, pulse 62, oxygen 98%, weight 209 lbs, blood sugar 127  Reviewed prior chart from [facility]    5/22/20  BNP was neg after last visit, Cr slightly increased with BUN elevated told to take lasix daily and PRN extra. He saw Dr. Holley yesterday, was given Flonase breathing improved.   /120s systolics, 67 diastolic, pulse 60s. . Overall doing will. Needs device clinic.     7/16/20  ECHO with EF 50%, mild to mod MR/TR. Pt had trouble getting Entresto filled due to being in donut hole but patient assistance form filled out.   He has significant back pain. He is euvolemic, no CP/SOB.   ECG - bi V paced    9/24/20 - hosp f/u  He was admitted for lower GI bleed and acute blood loss anemia. Initial H/H of 5.1/16.9.  Patient was transfused 2 units of PRBCs. GI consulted. Will hold anticoagulations. Today H/H 7.4/23.3. Case  discussed with Dr. Farmer, will need  EGD and colonoscopy to evaluate further and given recent anticoagulation to decrease the risk of bleeding  from the procedures will plan for the procedures in 5 days. As of 9/20 pt had a bowel movement with red blood yesterday morning, no further episodes reported. This morning H/H 6.7/21.5, will transfuse 2 units of PRBCs. Plans for EGD/colonoscopy on Tuesday. As of 9/21 no active bleeding noted. H/H 9.0/28.0. Continue to monitor. Plan for procedure tomorrow. 09/22: Patient had EGD that showed gastritis which was biopsied. Discussed with GI who recommends dc home today, restart anti coagulation tomorrow, and f/u OP for video capsule study. GI will arrange OP f/u and call patient. New Rx for Pantoprazole 40mg daily sent to pharmacy.   He has restarted taking Eliqis    11/3/20  He has been to ER few times with fatigue and symptomatic anemia, transfused 10/23/20 and then again last Sunday 11/2/20. He cannot get Video capsule study due to pacemaker. He will see Dr. Nath for further workup tx. He has severe back pain, had injection which improved sx will get RFA. Will change Entresto due to Losartan, has trouble affording some meds, ELiquis is also expensive but prefer over coumadin.     2/11/21  He has gotten both COVID vaccines, he is pleased with the process. He has been doing well overall. No CP/SOB. BP and HR well controlled.   He had renal eval and told may need more Losartan.     5/6/21  BP and HR is well controlled.   ECG - V paced, Biv paced    From Device check  saw this pt in clinic in February. He appears to be symptomatic to [employer] alert for fluid overload. He confirms taking Lasix BID. He reports having 3L of fluid taken off of him a year ago in the hospital. He has AF and is currently undersensing some of the episodes. We will attempt to address this in clinic. He is a CRT-P and his BiV pacing has decreased to 91.8%, which in order to be effective is preferred at 95%. He does not see an EP.    Today he has fatigue and back pain. He has had crawfish a few times last few months.     7/15/21  Last device  interrog with AF with freq mode switch and inc Optivol.     Per Dr. Walton-    Last week he was found to have a critical low hemoglobin of 5.8.  Sent to the ER and had 2 units packed red blood cells transfused.  Has had a problem with chronic anemia with multiple transfusions in the past.  Last EGD and colonoscopy were negative.  No small bowel studies were done at that time.  Also followed by hematology for iron infusions.    He will see heme/onc next week.     10/28/21  From pt - I have some shortness of breath, but I have been under the weather. I've seen Dr. Walton last Friday. Feeling bloated, took a potassium tablet last night. Very little coughing today, starting to dry up. See you tomorrow.  His feet have been getting too tight in shoes.     11/11/21  He went to a recent party where he was very active and feeling well now. He had recent URI sx imporve. Weight 205-207 lbs. BP and HR stable.   ECG - V paced, Bi V paced    Hospital Course:   Patient was kept on OBS for symptomatic anemia under the care of hospital medicine.  12/23: Hgb increased to 7.4 overnight with transfusion of 2u pRBC. Pt is still very tired. Troponin increased to 0.118, pt mukesh CP, palpitations, but admits to SOB with exertion. cardiology consulted. GI feels like no indication for intervention, will continue OP f/u for VCE that is scheduled for next week, they have signed off. Heme/onc following - will start IV iron and continue OP f/u as scheduled.  12/24/21 Hemoglobin stable. Will have VCE completed outpatient per GI. Okay to resume Eliquis 2.5mg daily BID. He was asked to hold Plavix and start ASA for CAD until after GI test.     12/30/21  BP 140s/70s. HR 80s. Pt had recent admission for anemia with elevated trop, s/p PRBC, is on Eliquis 2.5 BID, DAPT on hold. He is taking only half tab Eliquis BID.     Component Ref Range & Units 3 wk ago   (12/30/21) 1 mo ago   (12/17/21) 2 mo ago   (10/28/21) 8 mo ago   (5/11/21) 1 yr ago    (4/27/20) 3 yr ago   (7/31/18) 3 yr ago   (3/5/18)   BNP 0 - 99 pg/mL 471 High   222 High  CM  282 High  CM              1/20/22  BP and HR stable today. BNP was elevated told pt - labs reveal elevated BNP due to extra fluid, need to double up lasix to 40mg twice a day until breathing is improving and have less swelling/weight, continue monitoring bP and weights daily and low salt diet, follow up with me in 2-3 weeks will adjust meds further. Kidney function, potassium and magnesium are stable.     He still has more back pain, sees Dr. Gamez. His breathing is better and energy is improved. He is taking iron tabs. His video capsule was neg. No further bleeding issues.   219 lbs --> 206 lbs today     Patient feels no chest pain,  no PND, no palpitation, no dizziness, no syncope, no CNS symptoms.    Patient has dec exercise tolerance.    Patient is compliant with medications.    6/8/21 Device  Since 7/23/2020:  AMS x 6101 - available EGMs c/w AF, burden only 8.5%, possible undersensing noted.  P wave 0.4mV, increased atrial sensitivity 0.3 --> 0.15mV.    OptiVol WNL, possible fluid build up Feb 7 - May 22nd.  Pt has congestive heart failure and cardiomegaly.  Well-functioning device.     Results for orders placed during the hospital encounter of 06/01/20   Echo Color Flow Doppler? Yes    Narrative · Concentric left ventricular hypertrophy.  · Left ventricular systolic function. The estimated ejection fraction is   50%.  · Indeterminate left ventricular diastolic function.  · Severe left atrial enlargement.  · Mild-to-moderate mitral regurgitation.  · Mild to moderate tricuspid regurgitation.            Past Medical History:   Diagnosis Date    Abnormal PFT     Anemia     Arthritis     Atrial fibrillation 10/19/2017    Back pain     Congestive heart failure 3/5/2018    Coronary artery disease     DM (diabetes mellitus) 2018     am 06/23/2020    DM (diabetes mellitus) 2016     am 08/17/2021     Hyperlipemia     Hypertension     Myocardial infarction     Obesity     NASREEN (obstructive sleep apnea) 6/5/2018    Prostate cancer 2015    Tobacco dependence     Type 2 diabetes mellitus        Past Surgical History:   Procedure Laterality Date    COLONOSCOPY N/A 9/22/2020    Procedure: COLONOSCOPY;  Surgeon: Javier Farmer MD;  Location: [facility];  Service: Endoscopy;  Laterality: N/A;    CORONARY ARTERY BYPASS GRAFT  1987    EPIDURAL STEROID INJECTION N/A 11/22/2019    Procedure: Lumbar L5/S1 IL XUAN;  Surgeon: Tacho Trivedi MD;  Location: [facility];  Service: Pain Management;  Laterality: N/A;    EPIDURAL STEROID INJECTION N/A 1/6/2020    Procedure: Lumbar L5/S1 IL XUAN;  Surgeon: Tacho Trivedi MD;  Location: [facility];  Service: Pain Management;  Laterality: N/A;    EPIDURAL STEROID INJECTION N/A 2/10/2020    Procedure: Caudal XUAN;  Surgeon: Tacho Trivedi MD;  Location: [facility];  Service: Pain Management;  Laterality: N/A;    ESOPHAGOGASTRODUODENOSCOPY N/A 9/22/2020    Procedure: EGD (ESOPHAGOGASTRODUODENOSCOPY);  Surgeon: Javier Farmer MD;  Location: [facility];  Service: Endoscopy;  Laterality: N/A;    INJECTION OF ANESTHETIC AGENT AROUND MEDIAL BRANCH NERVES INNERVATING LUMBAR FACET JOINT Bilateral 10/12/2020    Procedure: Bilateral L3-5 MBB;  Surgeon: Tacho Trivedi MD;  Location: [facility];  Service: Pain Management;  Laterality: Bilateral;    INJECTION OF ANESTHETIC AGENT AROUND MEDIAL BRANCH NERVES INNERVATING LUMBAR FACET JOINT Bilateral 12/21/2020    Procedure: Bilateral L3-5 MBB with steroid;  Surgeon: Tacho Trivedi MD;  Location: [facility];  Service: Pain Management;  Laterality: Bilateral;    INTRALUMINAL GASTROINTESTINAL TRACT IMAGING VIA CAPSULE N/A 12/29/2021    Procedure: IMAGING PROCEDURE, GI TRACT, INTRALUMINAL, VIA CAPSULE;  Surgeon: Tahir Geronimo RN;  Location[facility];  Service: Endoscopy;  Laterality: N/A;     PROSTATE SURGERY      prostate radiation    RADIOFREQUENCY THERMOCOAGULATION Left 2021    Procedure: Left L3-5 Lumbar RFA;  Surgeon: Tacho Trivedi MD;  Location: [facility];  Service: Pain Management;  Laterality: Left;    RADIOFREQUENCY THERMOCOAGULATION Right 2021    Procedure: Right L3-5 Lumbar RFA;  Surgeon: Tacho Trivedi MD;  Location: [facility];  Service: Pain Management;  Laterality: Right;    SPINE SURGERY      fusion    TONSILLECTOMY         Social History     Tobacco Use    Smoking status: Former Smoker     Packs/day: 1.50     Years: 20.00     Pack years: 30.00     Types: Cigarettes     Start date:      Quit date:      Years since quittin.0    Smokeless tobacco: Never Used   Substance Use Topics    Alcohol use: No    Drug use: No       Family History   Problem Relation Age of Onset    Heart attack Mother     Diabetes Mother     Heart disease Mother     Cataracts Mother     Stroke Father     Heart disease Father     Heart disease Brother        Patient's Medications   New Prescriptions    No medications on file   Previous Medications    ACETAMINOPHEN (TYLENOL) 500 MG TABLET    Take 500 mg by mouth every 6 (six) hours as needed for Pain.    ALBUTEROL (ACCUNEB) 1.25 MG/3 ML NEBU    Take 3 mLs (1.25 mg total) by nebulization every 6 (six) hours as needed (cough and SOB). Rescue    APIXABAN (ELIQUIS) 2.5 MG TAB    Take 1 tablet (2.5 mg total) by mouth 2 (two) times daily.    ASPIRIN 81 MG CHEW    Take 1 tablet (81 mg total) by mouth once daily.    ATORVASTATIN (LIPITOR) 80 MG TABLET    One tablet daily    BLOOD SUGAR DIAGNOSTIC STRP    Check blood glucose levels daily in the AM fasting and 1-2 times more daily.    BLOOD-GLUCOSE METER KIT    Use as instructed    CHOLECALCIFEROL, VITAMIN D3, (VITAMIN D3) 25 MCG (1,000 UNIT) CAPSULE    Take 5,000 Units by mouth once daily.    CLONAZEPAM (KLONOPIN) 1 MG TABLET    Take 1 tablet (1 mg total) by mouth nightly as  needed for Anxiety.    FERROUS SULFATE (FEOSOL) 325 MG (65 MG IRON) TAB TABLET    Take 325 mg by mouth daily with breakfast.    FLUTICASONE PROPIONATE (FLONASE) 50 MCG/ACTUATION NASAL SPRAY    2 sprays (100 mcg total) by Each Nostril route once daily.    FUROSEMIDE (LASIX) 20 MG TABLET    Take 1 tablet (20 mg total) by mouth 2 (two) times daily. Can double to 2 tablets twice a day for 3 days for more swelling/fluid build up    LANCETS MISC    Check blood glucose levels daily in the AM fasting and 1-2 times more daily.    LEVOCETIRIZINE (XYZAL) 5 MG TABLET    Take 1 tablet (5 mg total) by mouth every evening.    LOSARTAN (COZAAR) 50 MG TABLET    Take 1 tablet (50 mg total) by mouth once daily.    MULTIVITAMIN CAPSULE    Take 1 capsule by mouth once daily.    PANTOPRAZOLE (PROTONIX) 40 MG TABLET    Take 1 tablet (40 mg total) by mouth once daily.    PREGABALIN (LYRICA) 75 MG CAPSULE    Take 1 capsule, 75 mg, once daily for 1 week.  Followed by take 1 capsule 75 mg twice daily for 1 week.  Followed by 2 capsules, 150 mg in the morning and 75 mg at night for 1 week.  Followed by 150 mg twice daily for 1 week.  Followed by 225 mg in the morning and 150 mg in the evening for 1 week.  Followed by 225 mg b.i.d..    SPIRONOLACTONE (ALDACTONE) 25 MG TABLET    Take 1 tablet (25 mg total) by mouth once daily.   Modified Medications    No medications on file   Discontinued Medications    No medications on file       Review of Systems   Constitutional: Positive for malaise/fatigue.   HENT: Negative.    Eyes: Negative.    Respiratory: Positive for shortness of breath.    Cardiovascular: Positive for leg swelling. Negative for chest pain.   Gastrointestinal: Negative.    Genitourinary: Negative.    Musculoskeletal: Negative.    Skin: Negative.    Neurological: Negative.    Endo/Heme/Allergies: Negative.    Psychiatric/Behavioral: Negative.    All 12 systems otherwise negative.      Wt Readings from Last 3 Encounters:   01/20/22  "93.7 kg (206 lb 9.1 oz)   01/05/22 94.3 kg (208 lb)   12/30/21 99.4 kg (219 lb 2.2 oz)     Temp Readings from Last 3 Encounters:   01/20/22 97.2 °F (36.2 °C)   01/13/22 97.3 °F (36.3 °C)   12/24/21 98.6 °F (37 °C)     BP Readings from Last 3 Encounters:   01/20/22 126/62   01/20/22 129/61   01/13/22 (!) 142/74     Pulse Readings from Last 3 Encounters:   01/20/22 85   01/20/22 60   01/13/22 61       /62 (BP Location: Left arm, Patient Position: Sitting, BP Method: Medium (Manual))   Pulse 85   Resp 16   Ht 5' 9" (1.753 m)   Wt 93.7 kg (206 lb 9.1 oz)   SpO2 97%   BMI 30.51 kg/m²     Objective:   Physical Exam  Vitals and nursing note reviewed.   Constitutional:       General: He is not in acute distress.     Appearance: He is well-developed. He is not diaphoretic.   HENT:      Head: Normocephalic and atraumatic.      Nose: Nose normal.   Eyes:      General: No scleral icterus.        Right eye: No discharge.         Left eye: No discharge.      Conjunctiva/sclera: Conjunctivae normal.   Neck:      Thyroid: No thyromegaly.      Vascular: No JVD.   Cardiovascular:      Rate and Rhythm: Normal rate and regular rhythm.      Heart sounds: S1 normal and S2 normal. Murmur heard.   No friction rub. No gallop. No S3 or S4 sounds.    Pulmonary:      Effort: Pulmonary effort is normal. No respiratory distress.      Breath sounds: Normal breath sounds. No stridor. No wheezing or rales.   Chest:      Chest wall: No tenderness.   Abdominal:      General: Bowel sounds are normal. There is no distension.      Palpations: Abdomen is soft. There is no mass.      Tenderness: There is no abdominal tenderness. There is no rebound.   Genitourinary:     Comments: Deferred  Musculoskeletal:         General: No tenderness or deformity. Normal range of motion.      Cervical back: Normal range of motion and neck supple.   Lymphadenopathy:      Cervical: No cervical adenopathy.   Skin:     General: Skin is warm and dry.      " Coloration: Skin is not pale.      Findings: No erythema or rash.   Neurological:      Mental Status: He is alert and oriented to person, place, and time.      Motor: No abnormal muscle tone.      Coordination: Coordination normal.   Psychiatric:         Behavior: Behavior normal.         Thought Content: Thought content normal.         Judgment: Judgment normal.         Lab Results   Component Value Date     01/05/2022    K 3.4 (L) 01/05/2022     01/05/2022    CO2 26 01/05/2022    BUN 16 01/05/2022    CREATININE 1.7 (H) 01/05/2022     (H) 01/05/2022    HGBA1C 6.3 (H) 07/07/2021    MG 2.0 12/30/2021    AST 16 01/05/2022    ALT 8 (L) 01/05/2022    ALBUMIN 3.9 01/05/2022    PROT 7.3 01/05/2022    BILITOT 0.4 01/05/2022    WBC 4.06 01/05/2022    HGB 9.2 (L) 01/05/2022    HCT 31.9 (L) 01/05/2022    MCV 90 01/05/2022     01/05/2022    INR 1.2 11/01/2020    TSH 1.268 11/04/2020    CHOL 126 07/07/2021    HDL 38 (L) 07/07/2021    LDLCALC 52.4 (L) 07/07/2021    TRIG 178 (H) 07/07/2021     (H) 12/30/2021     Assessment:      1. PAF (paroxysmal atrial fibrillation)    2. Coronary artery disease of native artery of native heart with stable angina pectoris    3. MI, old    4. Primary hypertension    5. Stage 4 chronic kidney disease    6. Chronic diastolic congestive heart failure    7. Arteriosclerosis of aorta    8. S/P CABG x 3    9. Iron deficiency anemia due to chronic blood loss    10. Diabetes mellitus type 2 in obese    11. Anemia associated with stage 4 chronic renal failure    12. Obstructive sleep apnea        Plan:   1. CAD s/p CABG x3, old MI  - cont meds - DAPT on hold due to anemia   - elevated trop sec to demand ischemia     2. HFPEF with TR/MR, improved EF ;  --> 282 -> 222 --> 471; 219 lbs --> 206 lbs today  - cont meds - cont lasix to 40mg BID -- decreased 20mg BID by nephro, s/p inreased to lasix 40 BID PRN  --> increased to lasix 40 BID few days  - cont to monitor  valve disease  - change Entresto to Losartan  - increased Losartan  - cont Aldactone     3. PAF had episode of PNA  - cont meds - Eliquis   - EP consult for LAAC device - Watchman device; discusses indications, pros/con    [id number] score: 5  HAS-BLED score: 5    If you answer NO to any of the four criteria below, the patient does not meet the WATCHMAN implant eligibility requirements.     1. Patient has non-valvular atrial fibrillation: Yes  2. Patient has an increased risk for stroke and is recommended for oral anticoagulation (OAC): Yes  3. Patient is suitable for short-term anticoagulation therapy but deemed unable to take long-term OAC: Yes  Patient has an appropriate rationale to seek a non-pharmacological alternative to OACs. Specific factors include: History of bleeding or increased bleeding risk,    CHADSVASC - 5  HASLBED score - 5    4. NASREEN  - cont CPAP    5. Restricive/obstrucive lung disease  - cont tx per PCP/pulm    6. DM2 6.0  --> 6.1 --> 6.3  - cont tx per PCP    7. AVB s/p ICD - CRT-P and his BiV pacing has decreased to 91.8%,  - cont to monitor  - f/u device clinic  - Last device interrog with AF with freq mode switch and inc Optivol    8. Anemia sec to GIB, CKD  - f/u with GI and heme/onc; s/p video capsule - was neg  - s/p PRBC, is on Eliquis 2.5 BID, DAPT on hold.   - cont iron tabs and IV iron infusion     9. CKD  - cont to monitor   - f/u nephro     Thank you for allowing me to participate in this patient's care. Please do not hesitate to contact me with any questions or concerns. Consult note has been forwarded to the referral physician.

## 2022-01-20 NOTE — TELEPHONE ENCOUNTER
LM for pt to call us back for results      ----- Message from Phillip Hood MD sent at 1/20/2022  4:59 PM CST -----  Please contact the patient and let them know that their labs reveal low potassium and magnesium, start taking potassium 20 meq and magnesium 400mg daily. BNP has improved from prior due to less fluid, continue lasix and fluid management as you are.

## 2022-01-21 ENCOUNTER — TELEPHONE (OUTPATIENT)
Dept: CARDIOLOGY | Facility: CLINIC | Age: 82
End: 2022-01-21
Payer: MEDICARE

## 2022-01-21 NOTE — TELEPHONE ENCOUNTER
Patient contacted and was advised that labs reveal low potassium and magnesium, start taking potassium 20 meq and magnesium 400mg daily. BNP has improved from prior due to less fluid, continue lasix and fluid management as you are.   Patient stated understanding with no questions or concerns

## 2022-01-26 ENCOUNTER — OFFICE VISIT (OUTPATIENT)
Dept: CARDIOLOGY | Facility: CLINIC | Age: 82
End: 2022-01-26
Payer: MEDICARE

## 2022-01-26 VITALS
WEIGHT: 206.13 LBS | OXYGEN SATURATION: 99 % | DIASTOLIC BLOOD PRESSURE: 70 MMHG | HEART RATE: 76 BPM | HEIGHT: 69 IN | SYSTOLIC BLOOD PRESSURE: 136 MMHG | BODY MASS INDEX: 30.53 KG/M2

## 2022-01-26 DIAGNOSIS — Z95.1 S/P CABG X 3: Primary | ICD-10-CM

## 2022-01-26 DIAGNOSIS — I48.0 PAF (PAROXYSMAL ATRIAL FIBRILLATION): ICD-10-CM

## 2022-01-26 PROCEDURE — 3075F SYST BP GE 130 - 139MM HG: CPT | Mod: HCNC,CPTII,S$GLB, | Performed by: INTERNAL MEDICINE

## 2022-01-26 PROCEDURE — 3078F DIAST BP <80 MM HG: CPT | Mod: HCNC,CPTII,S$GLB, | Performed by: INTERNAL MEDICINE

## 2022-01-26 PROCEDURE — 1159F PR MEDICATION LIST DOCUMENTED IN MEDICAL RECORD: ICD-10-PCS | Mod: HCNC,CPTII,S$GLB, | Performed by: INTERNAL MEDICINE

## 2022-01-26 PROCEDURE — 1159F MED LIST DOCD IN RCRD: CPT | Mod: HCNC,CPTII,S$GLB, | Performed by: INTERNAL MEDICINE

## 2022-01-26 PROCEDURE — 3078F PR MOST RECENT DIASTOLIC BLOOD PRESSURE < 80 MM HG: ICD-10-PCS | Mod: HCNC,CPTII,S$GLB, | Performed by: INTERNAL MEDICINE

## 2022-01-26 PROCEDURE — 3072F PR LOW RISK FOR RETINOPATHY: ICD-10-PCS | Mod: HCNC,CPTII,S$GLB, | Performed by: INTERNAL MEDICINE

## 2022-01-26 PROCEDURE — 1126F AMNT PAIN NOTED NONE PRSNT: CPT | Mod: HCNC,CPTII,S$GLB, | Performed by: INTERNAL MEDICINE

## 2022-01-26 PROCEDURE — 3072F LOW RISK FOR RETINOPATHY: CPT | Mod: HCNC,CPTII,S$GLB, | Performed by: INTERNAL MEDICINE

## 2022-01-26 PROCEDURE — 99999 PR PBB SHADOW E&M-EST. PATIENT-LVL V: CPT | Mod: PBBFAC,HCNC,, | Performed by: INTERNAL MEDICINE

## 2022-01-26 PROCEDURE — 1126F PR PAIN SEVERITY QUANTIFIED, NO PAIN PRESENT: ICD-10-PCS | Mod: HCNC,CPTII,S$GLB, | Performed by: INTERNAL MEDICINE

## 2022-01-26 PROCEDURE — 99999 PR PBB SHADOW E&M-EST. PATIENT-LVL V: ICD-10-PCS | Mod: PBBFAC,HCNC,, | Performed by: INTERNAL MEDICINE

## 2022-01-26 PROCEDURE — 99215 PR OFFICE/OUTPT VISIT, EST, LEVL V, 40-54 MIN: ICD-10-PCS | Mod: HCNC,S$GLB,, | Performed by: INTERNAL MEDICINE

## 2022-01-26 PROCEDURE — 3075F PR MOST RECENT SYSTOLIC BLOOD PRESS GE 130-139MM HG: ICD-10-PCS | Mod: HCNC,CPTII,S$GLB, | Performed by: INTERNAL MEDICINE

## 2022-01-26 PROCEDURE — 99215 OFFICE O/P EST HI 40 MIN: CPT | Mod: HCNC,S$GLB,, | Performed by: INTERNAL MEDICINE

## 2022-01-26 NOTE — PATIENT INSTRUCTIONS
Patient Education       Left Atrial Appendage Closure   Why is this procedure done?   Left atrial appendage or LILIAN is a small flat protrusion that sticks off of the side of the left atrium of your heart. It is the most common place for clots to form if you have atrial fibrillation or A-fib. LILIAN closure may lower your chances for a stroke. When you have A-fib, the signals to the upper part of the heart, the atria, are very fast and not regular. The atria dont beat as usual, but instead just quiver. This can cause your heart to beat 100 to 175 beats a minute or more. The blood does not empty fully from the upper chambers. It can just pool in the atria and the LILIAN. Clots may form, most often in the LILIAN, which puts you at higher risk for a stroke.  What will the results be?   The LILIAN closure blocks off this part of your heart and may lower your chance for a stroke. You may also be able to safely stop taking drugs to thin your blood after this procedure. An LILIAN closure will not make your a-fib go away.  What happens before the procedure?   · Ask a family member or friend to drive you home. You will not be allowed to drive after your procedure.  · Your doctor will ask about your health history. Talk to the doctor about:  ? All the drugs you are taking. Be sure to include all prescription, over the counter, vitamins, and herbal supplements. Bring a list of drugs you take with you.  ? Tell the doctor if you have any drug allergy.  ? Any bleeding problems. Be sure to tell your doctor if you are taking any drugs that may cause bleeding. Some of these are warfarin, rivaroxaban, apixaban, ticagrelor, clopidogrel, ketorolac, ibuprofen, naproxen, or aspirin. Certain vitamins and herbs, such as garlic and fish oil, may also add to the risk for bleeding. You may need to stop these drugs as well. Talk to your doctor about them.  ? If you have a stent in place, talk to the doctor that placed the stent before you stop any drugs that  thin your blood.  ? Typically, prior to LILIAN closure, your doctor will need you to be on a short course of Coumadin or other similar blood thinner to protect you from stroke before and during the procedure.  What happens during the procedure?   · The staff will put an IV in your arm to give you fluids and drugs. Once you are in the operating room, your doctor will give you a drug to make you sleepy. Sometimes, the doctor will give you a special drug to make you numb for the surgery. Other times, you are completely asleep.  · The doctor will decide what area to use for your procedure. It will most often be the area around one of your upper thighs (groin). This area will be shaved, cleaned, and numbed. The doctors will place a needle in the blood vessel. Then, the doctor passes a small wire and tube, called a catheter, through the needle and moves it into the heart.  · Another doctor will place a special ultrasound camera into your mouth and down your throat. This is a ANGELIC, or transesophageal echocardiogram. This camera will guide your doctor to make sure the device is in the right place and has a tight seal.  · The doctor uses a device to seal off the LILIAN. This device is left in place and blocks blood from going into the LILIAN.  · The doctor removes the catheter and wire. The doctor will use a sealant or plug to close the opening in the groin. This also lowers the chance of bleeding. Your doctor will place clean bandages over the area. The staff applies pressure to the area to control bleeding.  · The procedure most often takes from 1 to 3 hours.  What happens after the procedure?   · You will go to the Recovery Room and the staff will watch you closely.  · You will need to lie flat for some time, typically a few hours, before you can get up.  · You may have to stay in the hospital overnight.  What problems could happen?   · Internal bleeding or bleeding at the cut site  · LILIAN is not fully closed  · Damage to the blood  vessel  · Allergic reaction or kidney failure from the dye used  · Blood clots  · Irregular heartbeat  · Heart attack or other damage to the heart  · Stroke  · Death  Last Reviewed Date   2020-02-24  Consumer Information Use and Disclaimer   This information is not specific medical advice and does not replace information you receive from your health care provider. This is only a brief summary of general information. It does NOT include all information about conditions, illnesses, injuries, tests, procedures, treatments, therapies, discharge instructions or life-style choices that may apply to you. You must talk with your health care provider for complete information about your health and treatment options. This information should not be used to decide whether or not to accept your health care providers advice, instructions or recommendations. Only your health care provider has the knowledge and training to provide advice that is right for you.  Copyright   Copyright © 2021 [employer] and its affiliates and/or licensors. All rights reserved.

## 2022-01-27 DIAGNOSIS — J30.9 ALLERGIC RHINITIS, UNSPECIFIED SEASONALITY, UNSPECIFIED TRIGGER: ICD-10-CM

## 2022-01-28 NOTE — TELEPHONE ENCOUNTER
No new care gaps identified.  Powered by [employer]. Reference number: [id number].   1/27/2022 7:30:36 PM CST

## 2022-02-08 ENCOUNTER — OUTPATIENT CASE MANAGEMENT (OUTPATIENT)
Dept: ADMINISTRATIVE | Facility: OTHER | Age: 82
End: 2022-02-08
Payer: MEDICARE

## 2022-02-08 RX ORDER — SPIRONOLACTONE 25 MG/1
25 TABLET ORAL DAILY
Qty: 90 TABLET | Refills: 1 | Status: SHIPPED | OUTPATIENT
Start: 2022-02-08 | End: 2022-01-01 | Stop reason: SDUPTHER

## 2022-02-08 RX ORDER — PANTOPRAZOLE SODIUM 40 MG/1
40 TABLET, DELAYED RELEASE ORAL DAILY
Qty: 90 TABLET | Refills: 3 | Status: SHIPPED | OUTPATIENT
Start: 2022-02-08 | End: 2023-01-01 | Stop reason: SDUPTHER

## 2022-02-08 NOTE — PROGRESS NOTES
Outpatient Care Management  Plan of Care Follow Up Visit    Patient: Jake Ortiz  MRN: [id number]  Date of Service: 02/08/2022  Completed by: Peggy Joseph RN  Referral Date: 12/22/2021  Program: Case Management (High Risk)    Reason for Visit   Patient presents with    OPCM RN First Follow-Up Attempt     02/08/22        Brief Summary: 02/08/22 Attempt follow up with patient for outpatient case management. No answer. Left message requesting call back. RN OPCM first follow up attempt  02/18/22- Called and spoke to Mr. Ortiz. Medications reviewed. Appt.with cardiologist on 02/14/22. He is going to proceed with the watchman procedure in [facility]. /75 to  129/72  Reviewed  s/s of anemia and foods high in iron.  CM ACTION PLAN:  Follow up in two weeks  Review s/s of anemia  Review foods high in iron     Patient Summary     Involvement of Care:  Do I have permission to speak with other family members about your care?       Patient Reported Labs & Vitals:  1.  Any Patient Reported Labs & Vitals?     2.  Patient Reported Blood Pressure:     3.  Patient Reported Pulse:     4.  Patient Reported Weight (Kg):     5.  Patient Reported Blood Glucose (mg/dl):       Medical and social history was reviewed with patient and/or caregiver.     Clinical Assessment     Reviewed and provided basic information on available community resources for mental health, transportation, wellness resources, and palliative care programs with patient and/or caregiver.     Complex Care Plan     Care plan was discussed and completed today with input from patient and/or caregiver.    Patient Instructions     Instructions were provided via the [employer] patient resources and are available for the patient to view on the patient portal.      Follow up in about 2 weeks (around 3/10/2022) for RN Follow up call.    Todays OPCM Self-Management Care Plan was developed with the patients/caregivers input and was based on identified  barriers from todays assessment.  Goals were written today with the patient/caregiver and the patient has agreed to work towards these goals to improve his/her overall well-being. Patient verbalized understanding of the care plan, goals, and all of today's instructions. Encouraged patient/caregiver to communicate with his/her physician and health care team about health conditions and the treatment plan.  Provided my contact information today and encouraged patient/caregiver to call me with any questions as needed.

## 2022-02-08 NOTE — TELEPHONE ENCOUNTER
Sent to Dr. Hood for approval    ----- Message from Mayi Garza sent at 2/8/2022  9:03 AM CST -----  Contact: [facility]  .Type:  RX Refill Request    Who Called: [facility]  Refill or New Rx:Refill  RX Name and Strength:Pantoprazole 40mg  How is the patient currently taking it? (ex. 1XDay):1x a day  Is this a 30 day or 90 day RX:90 day  Preferred Pharmacy with phone number:.  [facility] - [place], [place] - [address]  [address] [id number]  Phone: [phone] Fax: [phone]  Local or Mail Order:mail order  Ordering Provider: Dr. Hood  Would the patient rather a call back or a response via [facility]? Call back   Best Call Back Number:[phone]  Additional Information:     .Type:  RX Refill Request    Who Called: Farida  Refill or New Rx:Refill  RX Name and Strength:Spironolactone 25mg  How is the patient currently taking it? (ex. 1XDay):1x a day  Is this a 30 day or 90 day RX:90 day  Preferred Pharmacy with phone number:.  [facility] - [facility] [address]  [address] [id number]  Phone: [phone] Fax: [phone]  Local or Mail Order:mail order  Ordering Provider: Dr. Hood  Would the patient rather a call back or a response via [facility]? Call back   Best Call Back Number:[phone]  Additional Information:

## 2022-02-10 ENCOUNTER — LAB VISIT (OUTPATIENT)
Dept: LAB | Facility: HOSPITAL | Age: 82
End: 2022-02-10
Attending: INTERNAL MEDICINE
Payer: MEDICARE

## 2022-02-10 ENCOUNTER — OFFICE VISIT (OUTPATIENT)
Dept: CARDIOLOGY | Facility: CLINIC | Age: 82
End: 2022-02-10
Payer: MEDICARE

## 2022-02-10 VITALS
BODY MASS INDEX: 29.71 KG/M2 | OXYGEN SATURATION: 99 % | WEIGHT: 200.63 LBS | HEIGHT: 69 IN | HEART RATE: 86 BPM | DIASTOLIC BLOOD PRESSURE: 74 MMHG | RESPIRATION RATE: 16 BRPM | SYSTOLIC BLOOD PRESSURE: 138 MMHG

## 2022-02-10 DIAGNOSIS — E66.9 DIABETES MELLITUS TYPE 2 IN OBESE: ICD-10-CM

## 2022-02-10 DIAGNOSIS — I25.118 CORONARY ARTERY DISEASE OF NATIVE ARTERY OF NATIVE HEART WITH STABLE ANGINA PECTORIS: ICD-10-CM

## 2022-02-10 DIAGNOSIS — I50.32 CHRONIC DIASTOLIC CONGESTIVE HEART FAILURE: ICD-10-CM

## 2022-02-10 DIAGNOSIS — E11.69 DIABETES MELLITUS TYPE 2 IN OBESE: ICD-10-CM

## 2022-02-10 DIAGNOSIS — I48.0 PAF (PAROXYSMAL ATRIAL FIBRILLATION): Primary | ICD-10-CM

## 2022-02-10 DIAGNOSIS — E83.42 HYPOMAGNESEMIA: ICD-10-CM

## 2022-02-10 DIAGNOSIS — I70.0 ARTERIOSCLEROSIS OF AORTA: ICD-10-CM

## 2022-02-10 DIAGNOSIS — N18.4 STAGE 4 CHRONIC KIDNEY DISEASE: ICD-10-CM

## 2022-02-10 DIAGNOSIS — G47.33 OBSTRUCTIVE SLEEP APNEA: ICD-10-CM

## 2022-02-10 DIAGNOSIS — Z95.1 S/P CABG X 3: ICD-10-CM

## 2022-02-10 DIAGNOSIS — I10 PRIMARY HYPERTENSION: ICD-10-CM

## 2022-02-10 DIAGNOSIS — I25.2 MI, OLD: ICD-10-CM

## 2022-02-10 DIAGNOSIS — D50.0 IRON DEFICIENCY ANEMIA DUE TO CHRONIC BLOOD LOSS: ICD-10-CM

## 2022-02-10 LAB
ANION GAP SERPL CALC-SCNC: 9 MMOL/L (ref 8–16)
BNP SERPL-MCNC: 156 PG/ML (ref 0–99)
BUN SERPL-MCNC: 23 MG/DL (ref 8–23)
CALCIUM SERPL-MCNC: 9.8 MG/DL (ref 8.7–10.5)
CHLORIDE SERPL-SCNC: 104 MMOL/L (ref 95–110)
CO2 SERPL-SCNC: 25 MMOL/L (ref 23–29)
CREAT SERPL-MCNC: 1.6 MG/DL (ref 0.5–1.4)
EST. GFR  (AFRICAN AMERICAN): 47 ML/MIN/1.73 M^2
EST. GFR  (NON AFRICAN AMERICAN): 40 ML/MIN/1.73 M^2
GLUCOSE SERPL-MCNC: 116 MG/DL (ref 70–110)
MAGNESIUM SERPL-MCNC: 2 MG/DL (ref 1.6–2.6)
POTASSIUM SERPL-SCNC: 4.4 MMOL/L (ref 3.5–5.1)
SODIUM SERPL-SCNC: 138 MMOL/L (ref 136–145)

## 2022-02-10 PROCEDURE — 1101F PT FALLS ASSESS-DOCD LE1/YR: CPT | Mod: HCNC,CPTII,S$GLB, | Performed by: INTERNAL MEDICINE

## 2022-02-10 PROCEDURE — 3075F PR MOST RECENT SYSTOLIC BLOOD PRESS GE 130-139MM HG: ICD-10-PCS | Mod: HCNC,CPTII,S$GLB, | Performed by: INTERNAL MEDICINE

## 2022-02-10 PROCEDURE — 99499 UNLISTED E&M SERVICE: CPT | Mod: S$GLB,,, | Performed by: INTERNAL MEDICINE

## 2022-02-10 PROCEDURE — 1126F AMNT PAIN NOTED NONE PRSNT: CPT | Mod: HCNC,CPTII,S$GLB, | Performed by: INTERNAL MEDICINE

## 2022-02-10 PROCEDURE — 3288F FALL RISK ASSESSMENT DOCD: CPT | Mod: HCNC,CPTII,S$GLB, | Performed by: INTERNAL MEDICINE

## 2022-02-10 PROCEDURE — 99999 PR PBB SHADOW E&M-EST. PATIENT-LVL IV: CPT | Mod: PBBFAC,HCNC,, | Performed by: INTERNAL MEDICINE

## 2022-02-10 PROCEDURE — 1159F PR MEDICATION LIST DOCUMENTED IN MEDICAL RECORD: ICD-10-PCS | Mod: HCNC,CPTII,S$GLB, | Performed by: INTERNAL MEDICINE

## 2022-02-10 PROCEDURE — 99999 PR PBB SHADOW E&M-EST. PATIENT-LVL IV: ICD-10-PCS | Mod: PBBFAC,HCNC,, | Performed by: INTERNAL MEDICINE

## 2022-02-10 PROCEDURE — 36415 COLL VENOUS BLD VENIPUNCTURE: CPT | Mod: HCNC | Performed by: INTERNAL MEDICINE

## 2022-02-10 PROCEDURE — 1101F PR PT FALLS ASSESS DOC 0-1 FALLS W/OUT INJ PAST YR: ICD-10-PCS | Mod: HCNC,CPTII,S$GLB, | Performed by: INTERNAL MEDICINE

## 2022-02-10 PROCEDURE — 99214 PR OFFICE/OUTPT VISIT, EST, LEVL IV, 30-39 MIN: ICD-10-PCS | Mod: HCNC,S$GLB,, | Performed by: INTERNAL MEDICINE

## 2022-02-10 PROCEDURE — 3072F LOW RISK FOR RETINOPATHY: CPT | Mod: HCNC,CPTII,S$GLB, | Performed by: INTERNAL MEDICINE

## 2022-02-10 PROCEDURE — 3078F DIAST BP <80 MM HG: CPT | Mod: HCNC,CPTII,S$GLB, | Performed by: INTERNAL MEDICINE

## 2022-02-10 PROCEDURE — 99214 OFFICE O/P EST MOD 30 MIN: CPT | Mod: HCNC,S$GLB,, | Performed by: INTERNAL MEDICINE

## 2022-02-10 PROCEDURE — 3072F PR LOW RISK FOR RETINOPATHY: ICD-10-PCS | Mod: HCNC,CPTII,S$GLB, | Performed by: INTERNAL MEDICINE

## 2022-02-10 PROCEDURE — 99499 RISK ADDL DX/OHS AUDIT: ICD-10-PCS | Mod: S$GLB,,, | Performed by: INTERNAL MEDICINE

## 2022-02-10 PROCEDURE — 3078F PR MOST RECENT DIASTOLIC BLOOD PRESSURE < 80 MM HG: ICD-10-PCS | Mod: HCNC,CPTII,S$GLB, | Performed by: INTERNAL MEDICINE

## 2022-02-10 PROCEDURE — 3075F SYST BP GE 130 - 139MM HG: CPT | Mod: HCNC,CPTII,S$GLB, | Performed by: INTERNAL MEDICINE

## 2022-02-10 PROCEDURE — 1160F PR REVIEW ALL MEDS BY PRESCRIBER/CLIN PHARMACIST DOCUMENTED: ICD-10-PCS | Mod: HCNC,CPTII,S$GLB, | Performed by: INTERNAL MEDICINE

## 2022-02-10 PROCEDURE — 80048 BASIC METABOLIC PNL TOTAL CA: CPT | Mod: HCNC | Performed by: INTERNAL MEDICINE

## 2022-02-10 PROCEDURE — 1126F PR PAIN SEVERITY QUANTIFIED, NO PAIN PRESENT: ICD-10-PCS | Mod: HCNC,CPTII,S$GLB, | Performed by: INTERNAL MEDICINE

## 2022-02-10 PROCEDURE — 3288F PR FALLS RISK ASSESSMENT DOCUMENTED: ICD-10-PCS | Mod: HCNC,CPTII,S$GLB, | Performed by: INTERNAL MEDICINE

## 2022-02-10 PROCEDURE — 1159F MED LIST DOCD IN RCRD: CPT | Mod: HCNC,CPTII,S$GLB, | Performed by: INTERNAL MEDICINE

## 2022-02-10 PROCEDURE — 83735 ASSAY OF MAGNESIUM: CPT | Mod: HCNC | Performed by: INTERNAL MEDICINE

## 2022-02-10 PROCEDURE — 83880 ASSAY OF NATRIURETIC PEPTIDE: CPT | Mod: HCNC | Performed by: INTERNAL MEDICINE

## 2022-02-10 PROCEDURE — 1160F RVW MEDS BY RX/DR IN RCRD: CPT | Mod: HCNC,CPTII,S$GLB, | Performed by: INTERNAL MEDICINE

## 2022-02-10 NOTE — PROGRESS NOTES
Subjective:   Patient ID:  Jake Ortiz is a 81 y.o. male who presents for cardiac consult of No chief complaint on file.      Follow-up  Pertinent negatives include no chest pain.     The patient came in today for cardiac consult of No chief complaint on file.    Jake Ortiz is a 81 y.o. male pt with CAD s/p CABG, old MI, HFPEF with TR/MR, AVB s/p CRT, PAF on Eliquis, Anemia, HTN, HLD, DM2, NASREEN, CKD4, GERD, Restrictive/obstructive lung disease here for follow up CV care.    4/21/20  Pt seen at [facility] last by CARMELINA Spencer 9/18/19. Overall feels great for the most part. He has been started to gain some water weight in the past, he had been on Entreso by Dr. Menendez, lost 173 lbs. He had paracentesis in past was told due to CHF. His weight started coming back up to 208 lbs and then was gaining weight and he has doubled his meds - Entresto. Otherwise feels well has good energy. He changed here due to insurance.   BP - 108/67, pulse 62, oxygen 98%, weight 209 lbs, blood sugar 127  Reviewed prior chart from [facility]    5/22/20  BNP was neg after last visit, Cr slightly increased with BUN elevated told to take lasix daily and PRN extra. He saw Dr. Holely yesterday, was given Flonase breathing improved.   /120s systolics, 67 diastolic, pulse 60s. . Overall doing will. Needs device clinic.     7/16/20  ECHO with EF 50%, mild to mod MR/TR. Pt had trouble getting Entresto filled due to being in donut hole but patient assistance form filled out.   He has significant back pain. He is euvolemic, no CP/SOB.   ECG - bi V paced    9/24/20 - hosp f/u  He was admitted for lower GI bleed and acute blood loss anemia. Initial H/H of 5.1/16.9.  Patient was transfused 2 units of PRBCs. GI consulted. Will hold anticoagulations. Today H/H 7.4/23.3. Case  discussed with Dr. Farmer, will need  EGD and colonoscopy to evaluate further and given recent anticoagulation to decrease the risk of bleeding  from the procedures will plan for the procedures in 5 days. As of 9/20 pt had a bowel movement with red blood yesterday morning, no further episodes reported. This morning H/H 6.7/21.5, will transfuse 2 units of PRBCs. Plans for EGD/colonoscopy on Tuesday. As of 9/21 no active bleeding noted. H/H 9.0/28.0. Continue to monitor. Plan for procedure tomorrow. 09/22: Patient had EGD that showed gastritis which was biopsied. Discussed with GI who recommends dc home today, restart anti coagulation tomorrow, and f/u OP for video capsule study. GI will arrange OP f/u and call patient. New Rx for Pantoprazole 40mg daily sent to pharmacy.   He has restarted taking Eliqis    11/3/20  He has been to ER few times with fatigue and symptomatic anemia, transfused 10/23/20 and then again last Sunday 11/2/20. He cannot get Video capsule study due to pacemaker. He will see Dr. Nath for further workup tx. He has severe back pain, had injection which improved sx will get RFA. Will change Entresto due to Losartan, has trouble affording some meds, ELiquis is also expensive but prefer over coumadin.     2/11/21  He has gotten both COVID vaccines, he is pleased with the process. He has been doing well overall. No CP/SOB. BP and HR well controlled.   He had renal eval and told may need more Losartan.     5/6/21  BP and HR is well controlled.   ECG - V paced, Biv paced    From Device check  saw this pt in clinic in February. He appears to be symptomatic to [employer] alert for fluid overload. He confirms taking Lasix BID. He reports having 3L of fluid taken off of him a year ago in the hospital. He has AF and is currently undersensing some of the episodes. We will attempt to address this in clinic. He is a CRT-P and his BiV pacing has decreased to 91.8%, which in order to be effective is preferred at 95%. He does not see an EP.    Today he has fatigue and back pain. He has had crawfish a few times last few months.     7/15/21  Last device  interrog with AF with freq mode switch and inc Optivol.     Per Dr. Walton-    Last week he was found to have a critical low hemoglobin of 5.8.  Sent to the ER and had 2 units packed red blood cells transfused.  Has had a problem with chronic anemia with multiple transfusions in the past.  Last EGD and colonoscopy were negative.  No small bowel studies were done at that time.  Also followed by hematology for iron infusions.    He will see heme/onc next week.     10/28/21  From pt - I have some shortness of breath, but I have been under the weather. I've seen Dr. Walton last Friday. Feeling bloated, took a potassium tablet last night. Very little coughing today, starting to dry up. See you tomorrow.  His feet have been getting too tight in shoes.     11/11/21  He went to a recent party where he was very active and feeling well now. He had recent URI sx imporve. Weight 205-207 lbs. BP and HR stable.   ECG - V paced, Bi V paced    Hospital Course:   Patient was kept on OBS for symptomatic anemia under the care of hospital medicine.  12/23: Hgb increased to 7.4 overnight with transfusion of 2u pRBC. Pt is still very tired. Troponin increased to 0.118, pt mukesh CP, palpitations, but admits to SOB with exertion. cardiology consulted. GI feels like no indication for intervention, will continue OP f/u for VCE that is scheduled for next week, they have signed off. Heme/onc following - will start IV iron and continue OP f/u as scheduled.  12/24/21 Hemoglobin stable. Will have VCE completed outpatient per GI. Okay to resume Eliquis 2.5mg daily BID. He was asked to hold Plavix and start ASA for CAD until after GI test.     12/30/21  BP 140s/70s. HR 80s. Pt had recent admission for anemia with elevated trop, s/p PRBC, is on Eliquis 2.5 BID, DAPT on hold. He is taking only half tab Eliquis BID.     Component Ref Range & Units 3 wk ago   (12/30/21) 1 mo ago   (12/17/21) 2 mo ago   (10/28/21) 8 mo ago   (5/11/21) 1 yr ago    (4/27/20) 3 yr ago   (7/31/18) 3 yr ago   (3/5/18)   BNP 0 - 99 pg/mL 471 High   222 High  CM  282 High  CM            1/20/22  BP and HR stable today. BNP was elevated told pt - labs reveal elevated BNP due to extra fluid, need to double up lasix to 40mg twice a day until breathing is improving and have less swelling/weight, continue monitoring bP and weights daily and low salt diet, follow up with me in 2-3 weeks will adjust meds further. Kidney function, potassium and magnesium are stable.   He still has more back pain, sees Dr. Gamez. His breathing is better and energy is improved. He is taking iron tabs. His video capsule was neg. No further bleeding issues.   219 lbs --> 206 lbs today     2/10/22  Pt had eval with Dr. Price will be a candidate for Watchman. Labs from last visit - labs reveal low potassium and magnesium, start taking potassium 20 meq and magnesium 400mg daily. BNP has improved.   BP and HR stable now. He wants to get Watchman in [place] possible. Breathing has improved, weight is coming down.     Patient feels no chest pain,  no PND, no palpitation, no dizziness, no syncope, no CNS symptoms.    Patient has dec exercise tolerance.    Patient is compliant with medications.    6/8/21 Device  Since 7/23/2020:  AMS x 6101 - available EGMs c/w AF, burden only 8.5%, possible undersensing noted.  P wave 0.4mV, increased atrial sensitivity 0.3 --> 0.15mV.    OptiVol WNL, possible fluid build up Feb 7 - May 22nd.  Pt has congestive heart failure and cardiomegaly.  Well-functioning device.     Results for orders placed during the hospital encounter of 06/01/20   Echo Color Flow Doppler? Yes    Narrative · Concentric left ventricular hypertrophy.  · Left ventricular systolic function. The estimated ejection fraction is   50%.  · Indeterminate left ventricular diastolic function.  · Severe left atrial enlargement.  · Mild-to-moderate mitral regurgitation.  · Mild to moderate tricuspid regurgitation.             Past Medical History:   Diagnosis Date    Abnormal PFT     Anemia     Arthritis     Atrial fibrillation 10/19/2017    Back pain     Congestive heart failure 3/5/2018    Coronary artery disease     DM (diabetes mellitus) 2018     am 06/23/2020    DM (diabetes mellitus) 2016     am 08/17/2021    Hyperlipemia     Hypertension     Myocardial infarction     Obesity     NASREEN (obstructive sleep apnea) 6/5/2018    Prostate cancer 2015    Tobacco dependence     Type 2 diabetes mellitus        Past Surgical History:   Procedure Laterality Date    COLONOSCOPY N/A 9/22/2020    Procedure: COLONOSCOPY;  Surgeon: Javier Farmer MD;  Location: [facility] ENDO;  Service: Endoscopy;  Laterality: N/A;    CORONARY ARTERY BYPASS GRAFT  1987    EPIDURAL STEROID INJECTION N/A 11/22/2019    Procedure: Lumbar L5/S1 IL XUAN;  Surgeon: Tacho Trivedi MD;  Location: [facility] PAIN MGT;  Service: Pain Management;  Laterality: N/A;    EPIDURAL STEROID INJECTION N/A 1/6/2020    Procedure: Lumbar L5/S1 IL XUAN;  Surgeon: Tacho Trivedi MD;  Location: [facility] PAIN MGT;  Service: Pain Management;  Laterality: N/A;    EPIDURAL STEROID INJECTION N/A 2/10/2020    Procedure: Caudal XUAN;  Surgeon: Tacho Trivedi MD;  Location: [facility] PAIN MGT;  Service: Pain Management;  Laterality: N/A;    ESOPHAGOGASTRODUODENOSCOPY N/A 9/22/2020    Procedure: EGD (ESOPHAGOGASTRODUODENOSCOPY);  Surgeon: Javire Farmer MD;  Location: [facility] ENDO;  Service: Endoscopy;  Laterality: N/A;    INJECTION OF ANESTHETIC AGENT AROUND MEDIAL BRANCH NERVES INNERVATING LUMBAR FACET JOINT Bilateral 10/12/2020    Procedure: Bilateral L3-5 MBB;  Surgeon: Tacho Trivedi MD;  Location: [facility] PAIN MGT;  Service: Pain Management;  Laterality: Bilateral;    INJECTION OF ANESTHETIC AGENT AROUND MEDIAL BRANCH NERVES INNERVATING LUMBAR FACET JOINT Bilateral 12/21/2020    Procedure: Bilateral L3-5 MBB with steroid;  Surgeon: Tacho AMARO  MD Shikha;  Location: [facility] PAIN MGT;  Service: Pain Management;  Laterality: Bilateral;    INTRALUMINAL GASTROINTESTINAL TRACT IMAGING VIA CAPSULE N/A 2021    Procedure: IMAGING PROCEDURE, GI TRACT, INTRALUMINAL, VIA CAPSULE;  Surgeon: Tahir Geronimo RN;  Location: [facility] ENDO;  Service: Endoscopy;  Laterality: N/A;    PROSTATE SURGERY      prostate radiation    RADIOFREQUENCY THERMOCOAGULATION Left 2021    Procedure: Left L3-5 Lumbar RFA;  Surgeon: Tacho Trivedi MD;  Location: [facility] PAIN MGT;  Service: Pain Management;  Laterality: Left;    RADIOFREQUENCY THERMOCOAGULATION Right 2021    Procedure: Right L3-5 Lumbar RFA;  Surgeon: Tacho Trivedi MD;  Location: [facility] PAIN MGT;  Service: Pain Management;  Laterality: Right;    SPINE SURGERY      fusion    TONSILLECTOMY         Social History     Tobacco Use    Smoking status: Former Smoker     Packs/day: 1.50     Years: 20.00     Pack years: 30.00     Types: Cigarettes     Start date:      Quit date:      Years since quittin.1    Smokeless tobacco: Never Used   Substance Use Topics    Alcohol use: No    Drug use: No       Family History   Problem Relation Age of Onset    Heart attack Mother     Diabetes Mother     Heart disease Mother     Cataracts Mother     Stroke Father     Heart disease Father     Heart disease Brother        Patient's Medications   New Prescriptions    No medications on file   Previous Medications    ACETAMINOPHEN (TYLENOL) 500 MG TABLET    Take 500 mg by mouth every 6 (six) hours as needed for Pain.    ALBUTEROL (ACCUNEB) 1.25 MG/3 ML NEBU    Take 3 mLs (1.25 mg total) by nebulization every 6 (six) hours as needed (cough and SOB). Rescue    APIXABAN (ELIQUIS) 2.5 MG TAB    Take 1 tablet (2.5 mg total) by mouth 2 (two) times daily.    ASPIRIN 81 MG CHEW    Take 1 tablet (81 mg total) by mouth once daily.    ATORVASTATIN (LIPITOR) 80 MG TABLET    One tablet daily    BLOOD SUGAR DIAGNOSTIC STRP    Check  blood glucose levels daily in the AM fasting and 1-2 times more daily.    BLOOD-GLUCOSE METER KIT    Use as instructed    CHOLECALCIFEROL, VITAMIN D3, (VITAMIN D3) 25 MCG (1,000 UNIT) CAPSULE    Take 5,000 Units by mouth once daily.    CLONAZEPAM (KLONOPIN) 1 MG TABLET    Take 1 tablet (1 mg total) by mouth nightly as needed for Anxiety.    FERROUS SULFATE (FEOSOL) 325 MG (65 MG IRON) TAB TABLET    Take 325 mg by mouth daily with breakfast.    FLUTICASONE PROPIONATE (FLONASE) 50 MCG/ACTUATION NASAL SPRAY    2 sprays (100 mcg total) by Each Nostril route once daily.    FUROSEMIDE (LASIX) 20 MG TABLET    Take 1 tablet (20 mg total) by mouth 2 (two) times daily. Can double to 2 tablets twice a day for 3 days for more swelling/fluid build up    LANCETS MISC    Check blood glucose levels daily in the AM fasting and 1-2 times more daily.    LEVOCETIRIZINE (XYZAL) 5 MG TABLET    Take 1 tablet (5 mg total) by mouth every evening.    LOSARTAN (COZAAR) 50 MG TABLET    Take 1 tablet (50 mg total) by mouth once daily.    MAGNESIUM OXIDE (MAG-OX) 400 MG (241.3 MG MAGNESIUM) TABLET    Take 1 tablet (400 mg total) by mouth once daily.    MULTIVITAMIN CAPSULE    Take 1 capsule by mouth once daily.    PANTOPRAZOLE (PROTONIX) 40 MG TABLET    Take 1 tablet (40 mg total) by mouth once daily.    POTASSIUM CHLORIDE SA (K-DUR,KLOR-CON) 20 MEQ TABLET    Take 1 tablet (20 mEq total) by mouth once daily.    PREGABALIN (LYRICA) 75 MG CAPSULE    Take 1 capsule, 75 mg, once daily for 1 week.  Followed by take 1 capsule 75 mg twice daily for 1 week.  Followed by 2 capsules, 150 mg in the morning and 75 mg at night for 1 week.  Followed by 150 mg twice daily for 1 week.  Followed by 225 mg in the morning and 150 mg in the evening for 1 week.  Followed by 225 mg b.i.d..    SPIRONOLACTONE (ALDACTONE) 25 MG TABLET    Take 1 tablet (25 mg total) by mouth once daily.   Modified Medications    No medications on file   Discontinued Medications    No  medications on file       Review of Systems   Constitutional: Positive for malaise/fatigue.   HENT: Negative.    Eyes: Negative.    Respiratory: Positive for shortness of breath.    Cardiovascular: Positive for leg swelling. Negative for chest pain.   Gastrointestinal: Negative.    Genitourinary: Negative.    Musculoskeletal: Negative.    Skin: Negative.    Neurological: Negative.    Endo/Heme/Allergies: Negative.    Psychiatric/Behavioral: Negative.    All 12 systems otherwise negative.      Wt Readings from Last 3 Encounters:   01/26/22 93.5 kg (206 lb 2.1 oz)   01/20/22 93.7 kg (206 lb 9.1 oz)   01/05/22 94.3 kg (208 lb)     Temp Readings from Last 3 Encounters:   01/20/22 97.2 °F (36.2 °C)   01/13/22 97.3 °F (36.3 °C)   12/24/21 98.6 °F (37 °C)     BP Readings from Last 3 Encounters:   01/26/22 136/70   01/20/22 126/62   01/20/22 129/61     Pulse Readings from Last 3 Encounters:   01/26/22 76   01/20/22 85   01/20/22 60       There were no vitals taken for this visit.    Objective:   Physical Exam  Vitals and nursing note reviewed.   Constitutional:       General: He is not in acute distress.     Appearance: He is well-developed. He is not diaphoretic.   HENT:      Head: Normocephalic and atraumatic.      Nose: Nose normal.   Eyes:      General: No scleral icterus.        Right eye: No discharge.         Left eye: No discharge.      Conjunctiva/sclera: Conjunctivae normal.   Neck:      Thyroid: No thyromegaly.      Vascular: No JVD.   Cardiovascular:      Rate and Rhythm: Normal rate and regular rhythm.      Heart sounds: S1 normal and S2 normal. Murmur heard.   No friction rub. No gallop. No S3 or S4 sounds.    Pulmonary:      Effort: Pulmonary effort is normal. No respiratory distress.      Breath sounds: Normal breath sounds. No stridor. No wheezing or rales.   Chest:      Chest wall: No tenderness.   Abdominal:      General: Bowel sounds are normal. There is no distension.      Palpations: Abdomen is soft.  There is no mass.      Tenderness: There is no abdominal tenderness. There is no rebound.   Genitourinary:     Comments: Deferred  Musculoskeletal:         General: No tenderness or deformity. Normal range of motion.      Cervical back: Normal range of motion and neck supple.   Lymphadenopathy:      Cervical: No cervical adenopathy.   Skin:     General: Skin is warm and dry.      Coloration: Skin is not pale.      Findings: No erythema or rash.   Neurological:      Mental Status: He is alert and oriented to person, place, and time.      Motor: No abnormal muscle tone.      Coordination: Coordination normal.   Psychiatric:         Behavior: Behavior normal.         Thought Content: Thought content normal.         Judgment: Judgment normal.         Lab Results   Component Value Date     01/20/2022    K 3.6 01/20/2022     01/20/2022    CO2 23 01/20/2022    BUN 14 01/20/2022    CREATININE 1.3 01/20/2022     (H) 01/20/2022    HGBA1C 6.3 (H) 07/07/2021    MG 1.5 (L) 01/20/2022    AST 16 01/05/2022    ALT 8 (L) 01/05/2022    ALBUMIN 3.9 01/05/2022    PROT 7.3 01/05/2022    BILITOT 0.4 01/05/2022    WBC 4.06 01/05/2022    HGB 9.2 (L) 01/05/2022    HCT 31.9 (L) 01/05/2022    MCV 90 01/05/2022     01/05/2022    INR 1.2 11/01/2020    TSH 1.268 11/04/2020    CHOL 126 07/07/2021    HDL 38 (L) 07/07/2021    LDLCALC 52.4 (L) 07/07/2021    TRIG 178 (H) 07/07/2021     (H) 01/20/2022     Assessment:      1. PAF (paroxysmal atrial fibrillation)    2. S/P CABG x 3    3. Hypomagnesemia    4. Coronary artery disease of native artery of native heart with stable angina pectoris    5. MI, old    6. Primary hypertension    7. Stage 4 chronic kidney disease    8. Chronic diastolic congestive heart failure    9. Arteriosclerosis of aorta    10. Iron deficiency anemia due to chronic blood loss    11. Diabetes mellitus type 2 in obese    12. Obstructive sleep apnea        Plan:   1. CAD s/p CABG x3, old MI  -  cont meds - DAPT on hold due to anemia   - elevated trop sec to demand ischemia     2. HFPEF with TR/MR, improved EF ;  --> 282 -> 222 --> 471 --> 230   Weight; 219 lbs --> 206  --> 200 lbs today  - cont meds - cont lasix to 40mg BID -- decreased 20mg BID by nephro, s/p inreased to lasix 40 BID PRN  --> increased to lasix 40 BID few days; with K/Mg  - cont to monitor valve disease  - change Entresto to Losartan  - increased Losartan  - cont Aldactone 25mg    3. PAF had episode of PNA  - cont meds - Eliquis   - f/u Dr. Price for Watchman device    [id number] score: 5  HAS-BLED score: 5    If you answer NO to any of the four criteria below, the patient does not meet the WATCHMAN implant eligibility requirements.     1. Patient has non-valvular atrial fibrillation: Yes  2. Patient has an increased risk for stroke and is recommended for oral anticoagulation (OAC): Yes  3. Patient is suitable for short-term anticoagulation therapy but deemed unable to take long-term OAC: Yes  Patient has an appropriate rationale to seek a non-pharmacological alternative to OACs. Specific factors include: History of bleeding or increased bleeding risk,    CHADSVASC - 5  HASLBED score - 5    4. NASREEN  - cont CPAP    5. Restricive/obstrucive lung disease  - cont tx per PCP/pulm    6. DM2 6.0  --> 6.1 --> 6.3  - cont tx per PCP    7. AVB s/p ICD - CRT-P and his BiV pacing has decreased to 91.8%,  - cont to monitor  - f/u device clinic  - Last device interrog with AF with freq mode switch and inc Optivol    8. Anemia sec to GIB, CKD  - f/u with GI and heme/onc; s/p video capsule - was neg  - s/p PRBC, is on Eliquis 2.5 BID, DAPT on hold.   - cont iron tabs and IV iron infusion     9. CKD  - cont to monitor   - f/u nephro     Thank you for allowing me to participate in this patient's care. Please do not hesitate to contact me with any questions or concerns. Consult note has been forwarded to the referral physician.

## 2022-02-10 NOTE — TELEPHONE ENCOUNTER
This Rx Request does not qualify for assessment with the ORC. Patient visited ED since LOV with PCP. Please review protocol details and the Care Due Message for extra clinical information.

## 2022-02-11 ENCOUNTER — TELEPHONE (OUTPATIENT)
Dept: CARDIOLOGY | Facility: CLINIC | Age: 82
End: 2022-02-11
Payer: MEDICARE

## 2022-02-11 ENCOUNTER — PATIENT MESSAGE (OUTPATIENT)
Dept: CARDIOLOGY | Facility: CLINIC | Age: 82
End: 2022-02-11
Payer: MEDICARE

## 2022-02-11 RX ORDER — LEVOCETIRIZINE DIHYDROCHLORIDE 5 MG/1
TABLET, FILM COATED ORAL
Qty: 90 TABLET | Refills: 1 | Status: SHIPPED | OUTPATIENT
Start: 2022-02-11 | End: 2023-01-01 | Stop reason: SDUPTHER

## 2022-02-12 NOTE — TELEPHONE ENCOUNTER
Spoke with the patient and gave him the test results seen below and medication changes, all questions were answered.     ----- Message from Phillip Hood MD sent at 2/10/2022  3:51 PM CST -----  Please contact the patient and let them know that their labs reveal improving BNP but still mildly elevated, continue fluid meds as discussed but if feeling dizzy/lightheaded or BP is low do not keep taking the lasix and spirolactone daily, can take the lasix as needed. Monitor BP and weights daily.

## 2022-02-14 ENCOUNTER — PATIENT OUTREACH (OUTPATIENT)
Dept: ADMINISTRATIVE | Facility: OTHER | Age: 82
End: 2022-02-14
Payer: MEDICARE

## 2022-02-14 ENCOUNTER — LAB VISIT (OUTPATIENT)
Dept: LAB | Facility: HOSPITAL | Age: 82
End: 2022-02-14
Attending: INTERNAL MEDICINE
Payer: MEDICARE

## 2022-02-14 DIAGNOSIS — D63.1 ANEMIA ASSOCIATED WITH STAGE 4 CHRONIC RENAL FAILURE: ICD-10-CM

## 2022-02-14 DIAGNOSIS — D50.0 IRON DEFICIENCY ANEMIA DUE TO CHRONIC BLOOD LOSS: ICD-10-CM

## 2022-02-14 DIAGNOSIS — N18.4 ANEMIA ASSOCIATED WITH STAGE 4 CHRONIC RENAL FAILURE: ICD-10-CM

## 2022-02-14 DIAGNOSIS — C61 ADENOCARCINOMA OF PROSTATE: ICD-10-CM

## 2022-02-14 DIAGNOSIS — N18.4 STAGE 4 CHRONIC KIDNEY DISEASE: ICD-10-CM

## 2022-02-14 LAB
ANION GAP SERPL CALC-SCNC: 9 MMOL/L (ref 8–16)
BASOPHILS # BLD AUTO: 0.06 K/UL (ref 0–0.2)
BASOPHILS NFR BLD: 1.1 % (ref 0–1.9)
BUN SERPL-MCNC: 21 MG/DL (ref 8–23)
CALCIUM SERPL-MCNC: 8.7 MG/DL (ref 8.7–10.5)
CHLORIDE SERPL-SCNC: 107 MMOL/L (ref 95–110)
CO2 SERPL-SCNC: 24 MMOL/L (ref 23–29)
CREAT SERPL-MCNC: 1.4 MG/DL (ref 0.5–1.4)
DIFFERENTIAL METHOD: ABNORMAL
EOSINOPHIL # BLD AUTO: 0.2 K/UL (ref 0–0.5)
EOSINOPHIL NFR BLD: 3.8 % (ref 0–8)
ERYTHROCYTE [DISTWIDTH] IN BLOOD BY AUTOMATED COUNT: 20.2 % (ref 11.5–14.5)
EST. GFR  (AFRICAN AMERICAN): 55 ML/MIN/1.73 M^2
EST. GFR  (NON AFRICAN AMERICAN): 47 ML/MIN/1.73 M^2
FERRITIN SERPL-MCNC: 608 NG/ML (ref 20–300)
GLUCOSE SERPL-MCNC: 94 MG/DL (ref 70–110)
HCT VFR BLD AUTO: 33.4 % (ref 40–54)
HGB BLD-MCNC: 10.7 G/DL (ref 14–18)
IMM GRANULOCYTES # BLD AUTO: 0.02 K/UL (ref 0–0.04)
IMM GRANULOCYTES NFR BLD AUTO: 0.4 % (ref 0–0.5)
IRON SERPL-MCNC: 108 UG/DL (ref 45–160)
LYMPHOCYTES # BLD AUTO: 1 K/UL (ref 1–4.8)
LYMPHOCYTES NFR BLD: 18.1 % (ref 18–48)
MCH RBC QN AUTO: 29.9 PG (ref 27–31)
MCHC RBC AUTO-ENTMCNC: 32 G/DL (ref 32–36)
MCV RBC AUTO: 93 FL (ref 82–98)
MONOCYTES # BLD AUTO: 0.6 K/UL (ref 0.3–1)
MONOCYTES NFR BLD: 10.4 % (ref 4–15)
NEUTROPHILS # BLD AUTO: 3.7 K/UL (ref 1.8–7.7)
NEUTROPHILS NFR BLD: 66.2 % (ref 38–73)
NRBC BLD-RTO: 0 /100 WBC
PLATELET # BLD AUTO: 260 K/UL (ref 150–450)
PMV BLD AUTO: 9.4 FL (ref 9.2–12.9)
POTASSIUM SERPL-SCNC: 4.4 MMOL/L (ref 3.5–5.1)
RBC # BLD AUTO: 3.58 M/UL (ref 4.6–6.2)
SATURATED IRON: 38 % (ref 20–50)
SODIUM SERPL-SCNC: 140 MMOL/L (ref 136–145)
TOTAL IRON BINDING CAPACITY: 284 UG/DL (ref 250–450)
TRANSFERRIN SERPL-MCNC: 192 MG/DL (ref 200–375)
WBC # BLD AUTO: 5.59 K/UL (ref 3.9–12.7)

## 2022-02-14 PROCEDURE — 84466 ASSAY OF TRANSFERRIN: CPT | Mod: HCNC | Performed by: INTERNAL MEDICINE

## 2022-02-14 PROCEDURE — 85025 COMPLETE CBC W/AUTO DIFF WBC: CPT | Mod: HCNC | Performed by: INTERNAL MEDICINE

## 2022-02-14 PROCEDURE — 36415 COLL VENOUS BLD VENIPUNCTURE: CPT | Mod: HCNC | Performed by: INTERNAL MEDICINE

## 2022-02-14 PROCEDURE — 82728 ASSAY OF FERRITIN: CPT | Mod: HCNC | Performed by: INTERNAL MEDICINE

## 2022-02-14 PROCEDURE — 84153 ASSAY OF PSA TOTAL: CPT | Mod: HCNC | Performed by: INTERNAL MEDICINE

## 2022-02-14 PROCEDURE — 80048 BASIC METABOLIC PNL TOTAL CA: CPT | Mod: HCNC | Performed by: INTERNAL MEDICINE

## 2022-02-15 LAB — COMPLEXED PSA SERPL-MCNC: <0.01 NG/ML (ref 0–4)

## 2022-02-17 DIAGNOSIS — N18.4 STAGE 4 CHRONIC KIDNEY DISEASE: Primary | ICD-10-CM

## 2022-02-17 DIAGNOSIS — D63.1 ANEMIA ASSOCIATED WITH STAGE 4 CHRONIC RENAL FAILURE: ICD-10-CM

## 2022-02-17 DIAGNOSIS — N18.4 ANEMIA ASSOCIATED WITH STAGE 4 CHRONIC RENAL FAILURE: ICD-10-CM

## 2022-02-17 DIAGNOSIS — D50.0 IRON DEFICIENCY ANEMIA DUE TO CHRONIC BLOOD LOSS: ICD-10-CM

## 2022-02-27 ENCOUNTER — PATIENT MESSAGE (OUTPATIENT)
Dept: CARDIOLOGY | Facility: CLINIC | Age: 82
End: 2022-02-27
Payer: MEDICARE

## 2022-02-27 ENCOUNTER — PATIENT MESSAGE (OUTPATIENT)
Dept: FAMILY MEDICINE | Facility: CLINIC | Age: 82
End: 2022-02-27
Payer: MEDICARE

## 2022-02-28 DIAGNOSIS — G47.61 PERIODIC LIMB MOVEMENT DISORDER (PLMD): ICD-10-CM

## 2022-02-28 RX ORDER — CLONAZEPAM 1 MG/1
1 TABLET ORAL NIGHTLY PRN
Qty: 90 TABLET | Refills: 0 | Status: CANCELLED | OUTPATIENT
Start: 2022-02-28 | End: 2022-03-30

## 2022-03-03 ENCOUNTER — TELEPHONE (OUTPATIENT)
Dept: ELECTROPHYSIOLOGY | Facility: CLINIC | Age: 82
End: 2022-03-03
Payer: MEDICARE

## 2022-03-03 NOTE — TELEPHONE ENCOUNTER
Attempted to contact patient to schedule procedure. No answer. Left voicemail with callback number. Mobile number listed in chart is not working.

## 2022-03-07 ENCOUNTER — TELEPHONE (OUTPATIENT)
Dept: ELECTROPHYSIOLOGY | Facility: CLINIC | Age: 82
End: 2022-03-07
Payer: MEDICARE

## 2022-03-10 ENCOUNTER — OUTPATIENT CASE MANAGEMENT (OUTPATIENT)
Dept: ADMINISTRATIVE | Facility: OTHER | Age: 82
End: 2022-03-10
Payer: MEDICARE

## 2022-03-10 NOTE — PROGRESS NOTES
Outpatient Care Management  Plan of Care Follow Up Visit    Patient: Jake Ortiz  MRN: [id number]  Date of Service: 03/10/2022  Completed by: Peggy Joseph RN  Referral Date: 12/22/2021  Program: Case Management (High Risk)    Reason for Visit   Patient presents with    Case Closure       Brief Summary: Called and spoke to Mr Ortiz, care plans reviewed. He is in agreement of case closure and dis-enroll from OPCM. Will mail him senior resource guide and RN contact info.     Patient Summary     Involvement of Care:  Do I have permission to speak with other family members about your care?       Patient Reported Labs & Vitals:  1.  Any Patient Reported Labs & Vitals?     2.  Patient Reported Blood Pressure:    137/66  3.  Patient Reported Pulse:   61  4.  Patient Reported Weight (Kg):   203  5.  Patient Reported Blood Glucose (mg/dl):          Patient reported Pulse Ox- 98%  Medical and social history was reviewed with patient and/or caregiver.     Clinical Assessment     Reviewed and provided basic information on available community resources for mental health, transportation, wellness resources, and palliative care programs with patient and/or caregiver.     Complex Care Plan     Care plan was discussed and completed today with input from patient and/or caregiver.    Patient Instructions     Instructions were provided via the [employer] patient resources and are available for the patient to view on the patient portal.    Closed case on 03/11/22      [facility] developed with the patients/caregivers input and was based on identified barriers from todays assessment.  Goals were written today with the patient/caregiver and the patient has agreed to work towards these goals to improve his/her overall well-being. Patient verbalized understanding of the care plan, goals, and all of today's instructions. Encouraged patient/caregiver to communicate with his/her physician and health care  team about health conditions and the treatment plan.  Provided my contact information today and encouraged patient/caregiver to call me with any questions as needed.

## 2022-03-17 ENCOUNTER — LAB VISIT (OUTPATIENT)
Dept: LAB | Facility: HOSPITAL | Age: 82
End: 2022-03-17
Attending: FAMILY MEDICINE
Payer: MEDICARE

## 2022-03-17 ENCOUNTER — OFFICE VISIT (OUTPATIENT)
Dept: FAMILY MEDICINE | Facility: CLINIC | Age: 82
End: 2022-03-17
Payer: MEDICARE

## 2022-03-17 VITALS
HEART RATE: 87 BPM | DIASTOLIC BLOOD PRESSURE: 60 MMHG | TEMPERATURE: 98 F | RESPIRATION RATE: 16 BRPM | WEIGHT: 208.44 LBS | OXYGEN SATURATION: 97 % | HEIGHT: 69 IN | SYSTOLIC BLOOD PRESSURE: 120 MMHG | BODY MASS INDEX: 30.87 KG/M2

## 2022-03-17 DIAGNOSIS — E66.9 DIABETES MELLITUS TYPE 2 IN OBESE: ICD-10-CM

## 2022-03-17 DIAGNOSIS — D63.1 ANEMIA ASSOCIATED WITH STAGE 4 CHRONIC RENAL FAILURE: ICD-10-CM

## 2022-03-17 DIAGNOSIS — E11.69 DIABETES MELLITUS TYPE 2 IN OBESE: Primary | ICD-10-CM

## 2022-03-17 DIAGNOSIS — E11.69 DIABETES MELLITUS TYPE 2 IN OBESE: ICD-10-CM

## 2022-03-17 DIAGNOSIS — N18.4 STAGE 4 CHRONIC KIDNEY DISEASE: ICD-10-CM

## 2022-03-17 DIAGNOSIS — D50.0 IRON DEFICIENCY ANEMIA DUE TO CHRONIC BLOOD LOSS: ICD-10-CM

## 2022-03-17 DIAGNOSIS — D64.9 SEVERE ANEMIA: ICD-10-CM

## 2022-03-17 DIAGNOSIS — N18.4 ANEMIA ASSOCIATED WITH STAGE 4 CHRONIC RENAL FAILURE: ICD-10-CM

## 2022-03-17 DIAGNOSIS — E66.9 DIABETES MELLITUS TYPE 2 IN OBESE: Primary | ICD-10-CM

## 2022-03-17 DIAGNOSIS — I48.21 PERMANENT ATRIAL FIBRILLATION: ICD-10-CM

## 2022-03-17 DIAGNOSIS — I25.118 CORONARY ARTERY DISEASE OF NATIVE ARTERY OF NATIVE HEART WITH STABLE ANGINA PECTORIS: ICD-10-CM

## 2022-03-17 DIAGNOSIS — I50.42 CHRONIC COMBINED SYSTOLIC (CONGESTIVE) AND DIASTOLIC (CONGESTIVE) HEART FAILURE: ICD-10-CM

## 2022-03-17 DIAGNOSIS — M54.16 LUMBAR RADICULOPATHY: ICD-10-CM

## 2022-03-17 LAB
ALBUMIN SERPL BCP-MCNC: 3.9 G/DL (ref 3.5–5.2)
ALP SERPL-CCNC: 81 U/L (ref 55–135)
ALT SERPL W/O P-5'-P-CCNC: 8 U/L (ref 10–44)
ANION GAP SERPL CALC-SCNC: 9 MMOL/L (ref 8–16)
AST SERPL-CCNC: 15 U/L (ref 10–40)
BASOPHILS # BLD AUTO: 0.05 K/UL (ref 0–0.2)
BASOPHILS NFR BLD: 1 % (ref 0–1.9)
BILIRUB SERPL-MCNC: 0.3 MG/DL (ref 0.1–1)
BUN SERPL-MCNC: 22 MG/DL (ref 8–23)
CALCIUM SERPL-MCNC: 8.7 MG/DL (ref 8.7–10.5)
CHLORIDE SERPL-SCNC: 108 MMOL/L (ref 95–110)
CO2 SERPL-SCNC: 23 MMOL/L (ref 23–29)
CREAT SERPL-MCNC: 1.8 MG/DL (ref 0.5–1.4)
DIFFERENTIAL METHOD: ABNORMAL
EOSINOPHIL # BLD AUTO: 0.1 K/UL (ref 0–0.5)
EOSINOPHIL NFR BLD: 2.8 % (ref 0–8)
ERYTHROCYTE [DISTWIDTH] IN BLOOD BY AUTOMATED COUNT: 19.6 % (ref 11.5–14.5)
EST. GFR  (AFRICAN AMERICAN): 40 ML/MIN/1.73 M^2
EST. GFR  (NON AFRICAN AMERICAN): 35 ML/MIN/1.73 M^2
ESTIMATED AVG GLUCOSE: 100 MG/DL (ref 68–131)
FERRITIN SERPL-MCNC: 105 NG/ML (ref 20–300)
GLUCOSE SERPL-MCNC: 149 MG/DL (ref 70–110)
HBA1C MFR BLD: 5.1 % (ref 4–5.6)
HCT VFR BLD AUTO: 24.6 % (ref 40–54)
HGB BLD-MCNC: 7.7 G/DL (ref 14–18)
IMM GRANULOCYTES # BLD AUTO: 0.02 K/UL (ref 0–0.04)
IMM GRANULOCYTES NFR BLD AUTO: 0.4 % (ref 0–0.5)
IRON SERPL-MCNC: 57 UG/DL (ref 45–160)
LYMPHOCYTES # BLD AUTO: 0.8 K/UL (ref 1–4.8)
LYMPHOCYTES NFR BLD: 16.3 % (ref 18–48)
MCH RBC QN AUTO: 32.4 PG (ref 27–31)
MCHC RBC AUTO-ENTMCNC: 31.3 G/DL (ref 32–36)
MCV RBC AUTO: 103 FL (ref 82–98)
MONOCYTES # BLD AUTO: 0.5 K/UL (ref 0.3–1)
MONOCYTES NFR BLD: 10.2 % (ref 4–15)
NEUTROPHILS # BLD AUTO: 3.5 K/UL (ref 1.8–7.7)
NEUTROPHILS NFR BLD: 69.3 % (ref 38–73)
NRBC BLD-RTO: 0 /100 WBC
PLATELET # BLD AUTO: 315 K/UL (ref 150–450)
PMV BLD AUTO: 9.2 FL (ref 9.2–12.9)
POTASSIUM SERPL-SCNC: 3.9 MMOL/L (ref 3.5–5.1)
PROT SERPL-MCNC: 7.2 G/DL (ref 6–8.4)
RBC # BLD AUTO: 2.38 M/UL (ref 4.6–6.2)
SATURATED IRON: 19 % (ref 20–50)
SODIUM SERPL-SCNC: 140 MMOL/L (ref 136–145)
TOTAL IRON BINDING CAPACITY: 299 UG/DL (ref 250–450)
TRANSFERRIN SERPL-MCNC: 202 MG/DL (ref 200–375)
WBC # BLD AUTO: 4.98 K/UL (ref 3.9–12.7)

## 2022-03-17 PROCEDURE — 85025 COMPLETE CBC W/AUTO DIFF WBC: CPT

## 2022-03-17 PROCEDURE — 1159F MED LIST DOCD IN RCRD: CPT | Mod: CPTII,S$GLB,, | Performed by: FAMILY MEDICINE

## 2022-03-17 PROCEDURE — 80053 COMPREHEN METABOLIC PANEL: CPT

## 2022-03-17 PROCEDURE — 99499 UNLISTED E&M SERVICE: CPT | Mod: S$GLB,,, | Performed by: FAMILY MEDICINE

## 2022-03-17 PROCEDURE — 1126F AMNT PAIN NOTED NONE PRSNT: CPT | Mod: CPTII,S$GLB,, | Performed by: FAMILY MEDICINE

## 2022-03-17 PROCEDURE — 3072F PR LOW RISK FOR RETINOPATHY: ICD-10-PCS | Mod: CPTII,S$GLB,, | Performed by: FAMILY MEDICINE

## 2022-03-17 PROCEDURE — 3288F PR FALLS RISK ASSESSMENT DOCUMENTED: ICD-10-PCS | Mod: CPTII,S$GLB,, | Performed by: FAMILY MEDICINE

## 2022-03-17 PROCEDURE — 3288F FALL RISK ASSESSMENT DOCD: CPT | Mod: CPTII,S$GLB,, | Performed by: FAMILY MEDICINE

## 2022-03-17 PROCEDURE — 1101F PR PT FALLS ASSESS DOC 0-1 FALLS W/OUT INJ PAST YR: ICD-10-PCS | Mod: CPTII,S$GLB,, | Performed by: FAMILY MEDICINE

## 2022-03-17 PROCEDURE — 99214 PR OFFICE/OUTPT VISIT, EST, LEVL IV, 30-39 MIN: ICD-10-PCS | Mod: S$GLB,,, | Performed by: FAMILY MEDICINE

## 2022-03-17 PROCEDURE — 1159F PR MEDICATION LIST DOCUMENTED IN MEDICAL RECORD: ICD-10-PCS | Mod: CPTII,S$GLB,, | Performed by: FAMILY MEDICINE

## 2022-03-17 PROCEDURE — 3078F DIAST BP <80 MM HG: CPT | Mod: CPTII,S$GLB,, | Performed by: FAMILY MEDICINE

## 2022-03-17 PROCEDURE — 82728 ASSAY OF FERRITIN: CPT

## 2022-03-17 PROCEDURE — 99999 PR PBB SHADOW E&M-EST. PATIENT-LVL V: ICD-10-PCS | Mod: PBBFAC,,, | Performed by: FAMILY MEDICINE

## 2022-03-17 PROCEDURE — 3074F SYST BP LT 130 MM HG: CPT | Mod: CPTII,S$GLB,, | Performed by: FAMILY MEDICINE

## 2022-03-17 PROCEDURE — 1101F PT FALLS ASSESS-DOCD LE1/YR: CPT | Mod: CPTII,S$GLB,, | Performed by: FAMILY MEDICINE

## 2022-03-17 PROCEDURE — 84466 ASSAY OF TRANSFERRIN: CPT

## 2022-03-17 PROCEDURE — 3078F PR MOST RECENT DIASTOLIC BLOOD PRESSURE < 80 MM HG: ICD-10-PCS | Mod: CPTII,S$GLB,, | Performed by: FAMILY MEDICINE

## 2022-03-17 PROCEDURE — 99214 OFFICE O/P EST MOD 30 MIN: CPT | Mod: S$GLB,,, | Performed by: FAMILY MEDICINE

## 2022-03-17 PROCEDURE — 3072F LOW RISK FOR RETINOPATHY: CPT | Mod: CPTII,S$GLB,, | Performed by: FAMILY MEDICINE

## 2022-03-17 PROCEDURE — 3074F PR MOST RECENT SYSTOLIC BLOOD PRESSURE < 130 MM HG: ICD-10-PCS | Mod: CPTII,S$GLB,, | Performed by: FAMILY MEDICINE

## 2022-03-17 PROCEDURE — 83036 HEMOGLOBIN GLYCOSYLATED A1C: CPT | Performed by: FAMILY MEDICINE

## 2022-03-17 PROCEDURE — 99499 RISK ADDL DX/OHS AUDIT: ICD-10-PCS | Mod: S$GLB,,, | Performed by: FAMILY MEDICINE

## 2022-03-17 PROCEDURE — 99999 PR PBB SHADOW E&M-EST. PATIENT-LVL V: CPT | Mod: PBBFAC,,, | Performed by: FAMILY MEDICINE

## 2022-03-17 PROCEDURE — 1126F PR PAIN SEVERITY QUANTIFIED, NO PAIN PRESENT: ICD-10-PCS | Mod: CPTII,S$GLB,, | Performed by: FAMILY MEDICINE

## 2022-03-17 RX ORDER — EPOETIN ALFA-EPBX 10000 [IU]/ML
INJECTION, SOLUTION INTRAVENOUS; SUBCUTANEOUS
COMMUNITY
Start: 2021-10-29 | End: 2022-04-26 | Stop reason: ALTCHOICE

## 2022-03-17 NOTE — PROGRESS NOTES
Subjective:       Patient ID: Jake Ortiz is a 81 y.o. male.    Chief Complaint: Follow-up      HPI Comments:       Current Outpatient Medications:     acetaminophen (TYLENOL) 500 MG tablet, Take 500 mg by mouth every 6 (six) hours as needed for Pain., Disp: , Rfl:     albuterol (ACCUNEB) 1.25 mg/3 mL Nebu, Take 3 mLs (1.25 mg total) by nebulization every 6 (six) hours as needed (cough and SOB). Rescue, Disp: 1 Box, Rfl: 0    apixaban (ELIQUIS) 2.5 mg Tab, Take 1 tablet (2.5 mg total) by mouth 2 (two) times daily., Disp: 180 tablet, Rfl: 1    aspirin 81 MG Chew, Take 1 tablet (81 mg total) by mouth once daily., Disp: 30 tablet, Rfl: 0    atorvastatin (LIPITOR) 80 MG tablet, One tablet daily, Disp: 90 tablet, Rfl: 1    blood sugar diagnostic Strp, Check blood glucose levels daily in the AM fasting and 1-2 times more daily., Disp: 300 strip, Rfl: 3    cholecalciferol, vitamin D3, (VITAMIN D3) 25 mcg (1,000 unit) capsule, Take 5,000 Units by mouth once daily., Disp: , Rfl:     ferrous sulfate (FEOSOL) 325 mg (65 mg iron) Tab tablet, Take 325 mg by mouth daily with breakfast., Disp: , Rfl:     fluticasone propionate (FLONASE) 50 mcg/actuation nasal spray, 2 sprays (100 mcg total) by Each Nostril route once daily., Disp: 48 g, Rfl: 5    furosemide (LASIX) 20 MG tablet, Take 1 tablet (20 mg total) by mouth 2 (two) times daily. Can double to 2 tablets twice a day for 3 days for more swelling/fluid build up, Disp: 60 tablet, Rfl: 11    lancets Misc, Check blood glucose levels daily in the AM fasting and 1-2 times more daily., Disp: 300 each, Rfl: 3    levocetirizine (XYZAL) 5 MG tablet, TAKE 1 TABLET EVERY EVENING, Disp: 90 tablet, Rfl: 1    losartan (COZAAR) 50 MG tablet, Take 1 tablet (50 mg total) by mouth once daily., Disp: 90 tablet, Rfl: 3    magnesium oxide (MAG-OX) 400 mg (241.3 mg magnesium) tablet, Take 1 tablet (400 mg total) by mouth once daily., Disp: 90 tablet, Rfl: 1    multivitamin capsule,  Take 1 capsule by mouth once daily., Disp: , Rfl:     pantoprazole (PROTONIX) 40 MG tablet, Take 1 tablet (40 mg total) by mouth once daily., Disp: 90 tablet, Rfl: 3    potassium chloride SA (K-DUR,KLOR-CON) 20 MEQ tablet, Take 1 tablet (20 mEq total) by mouth once daily., Disp: 90 tablet, Rfl: 1    RETACRIT 10,000 unit/mL imjection, , Disp: , Rfl:     spironolactone (ALDACTONE) 25 MG tablet, Take 1 tablet (25 mg total) by mouth once daily., Disp: 90 tablet, Rfl: 1    blood-glucose meter kit, Use as instructed, Disp: 1 each, Rfl: 0    clonazePAM (KLONOPIN) 1 MG tablet, Take 1 tablet (1 mg total) by mouth nightly as needed for Anxiety. (Patient not taking: No sig reported), Disp: 90 tablet, Rfl: 0  No current facility-administered medications for this visit.    Facility-Administered Medications Ordered in Other Visits:     ondansetron injection 4 mg, 4 mg, Intravenous, Once PRN, Tacho Trivedi MD        Generally doing well.  Planning on getting blood work today including CBC.    Has a throat tickle for last 24 hours.  Encouraged him to continue taking Xyzal and Flonase and let me know if you develops a cough.    Nerve did not help for his low back.  Some discussions have been started about spinal cord stimulator.  He has a lot of ambivalence.    Will probably get the Watchman procedure for his atrial fibrillation.  Still working up the details    Show me blood pressure readings from home.  For the most part they been less than 140/90    Review of Systems   Constitutional: Negative for activity change, appetite change and fever.   HENT: Positive for postnasal drip. Negative for sore throat.    Respiratory: Negative for cough and shortness of breath.    Cardiovascular: Negative for chest pain.   Gastrointestinal: Negative for abdominal pain, diarrhea and nausea.   Genitourinary: Negative for difficulty urinating.   Musculoskeletal: Positive for back pain. Negative for arthralgias and myalgias.  "  Neurological: Negative for dizziness and headaches.       Objective:      Vitals:    03/17/22 1057   BP: 120/60   Pulse: 87   Resp: 16   Temp: 97.6 °F (36.4 °C)   TempSrc: Temporal   SpO2: 97%   Weight: 94.6 kg (208 lb 7.1 oz)   Height: 5' 9" (1.753 m)   PainSc: 0-No pain     Physical Exam  Vitals and nursing note reviewed.   Constitutional:       General: He is not in acute distress.     Appearance: He is well-developed. He is not ill-appearing or diaphoretic.   HENT:      Head: Normocephalic.      Nose: No mucosal edema or rhinorrhea.      Mouth/Throat:      Pharynx: No pharyngeal swelling, oropharyngeal exudate or posterior oropharyngeal erythema.   Neck:      Thyroid: No thyromegaly.   Cardiovascular:      Rate and Rhythm: Normal rate and regular rhythm.      Pulses:           Dorsalis pedis pulses are 1+ on the right side and 1+ on the left side.        Posterior tibial pulses are 1+ on the right side and 1+ on the left side.      Heart sounds: Normal heart sounds. No murmur heard.  Pulmonary:      Effort: Pulmonary effort is normal.      Breath sounds: Normal breath sounds. No wheezing or rales.   Abdominal:      General: There is no distension.      Palpations: Abdomen is soft.   Musculoskeletal:      Cervical back: Neck supple.      Right lower leg: No edema.      Left lower leg: No edema.   Feet:      Right foot:      Protective Sensation: 7 sites tested. 7 sites sensed.      Skin integrity: No ulcer, blister, skin breakdown, erythema, warmth, callus, dry skin or fissure.      Left foot:      Protective Sensation: 7 sites tested. 7 sites sensed.      Skin integrity: No ulcer, blister, skin breakdown, erythema, warmth, callus, dry skin or fissure.   Lymphadenopathy:      Cervical: No cervical adenopathy.   Skin:     General: Skin is warm and dry.   Neurological:      Mental Status: He is alert and oriented to person, place, and time.   Psychiatric:         Behavior: Behavior normal.         Thought " Content: Thought content normal.         Judgment: Judgment normal.         Assessment:       1. Diabetes mellitus type 2 in obese    2. Coronary artery disease of native artery of native heart with stable angina pectoris    3. Severe anemia    4. Stage 4 chronic kidney disease    5. Permanent atrial fibrillation    6. Lumbar radiculopathy    7. Chronic combined systolic (congestive) and diastolic (congestive) heart failure        Plan:   Diabetes mellitus type 2 in obese  Comments:  A1c, microalbumin today.  Follow-up 6 months  Orders:  -     Microalbumin/Creatinine Ratio, Urine; Future; Expected date: 03/17/2022  -     Hemoglobin A1C; Future; Expected date: 03/17/2022    Coronary artery disease of native artery of native heart with stable angina pectoris  Comments:  On statin and aspirin    Severe anemia  Comments:  Recheck today    Stage 4 chronic kidney disease  Comments:  Recheck today    Permanent atrial fibrillation  Comments:  Watchman under consideration.  On Eliquis    Lumbar radiculopathy  Comments:  SCS under consideration. Redirected back to pain management for next steps.  Lyrica did not help    Chronic combined systolic (congestive) and diastolic (congestive) heart failure  Comments:  Good fluid balance

## 2022-03-22 ENCOUNTER — OFFICE VISIT (OUTPATIENT)
Dept: HEMATOLOGY/ONCOLOGY | Facility: CLINIC | Age: 82
End: 2022-03-22
Payer: MEDICARE

## 2022-03-22 VITALS
SYSTOLIC BLOOD PRESSURE: 118 MMHG | WEIGHT: 206.56 LBS | HEART RATE: 86 BPM | BODY MASS INDEX: 29.57 KG/M2 | DIASTOLIC BLOOD PRESSURE: 65 MMHG | HEIGHT: 70 IN | TEMPERATURE: 97 F

## 2022-03-22 DIAGNOSIS — N18.4 ANEMIA ASSOCIATED WITH STAGE 4 CHRONIC RENAL FAILURE: Primary | ICD-10-CM

## 2022-03-22 DIAGNOSIS — D63.1 ANEMIA ASSOCIATED WITH STAGE 4 CHRONIC RENAL FAILURE: Primary | ICD-10-CM

## 2022-03-22 DIAGNOSIS — D50.0 IRON DEFICIENCY ANEMIA DUE TO CHRONIC BLOOD LOSS: ICD-10-CM

## 2022-03-22 PROCEDURE — 3074F SYST BP LT 130 MM HG: CPT | Mod: CPTII,S$GLB,, | Performed by: NURSE PRACTITIONER

## 2022-03-22 PROCEDURE — 3072F PR LOW RISK FOR RETINOPATHY: ICD-10-PCS | Mod: CPTII,S$GLB,, | Performed by: NURSE PRACTITIONER

## 2022-03-22 PROCEDURE — 3072F LOW RISK FOR RETINOPATHY: CPT | Mod: CPTII,S$GLB,, | Performed by: NURSE PRACTITIONER

## 2022-03-22 PROCEDURE — 3074F PR MOST RECENT SYSTOLIC BLOOD PRESSURE < 130 MM HG: ICD-10-PCS | Mod: CPTII,S$GLB,, | Performed by: NURSE PRACTITIONER

## 2022-03-22 PROCEDURE — 1126F AMNT PAIN NOTED NONE PRSNT: CPT | Mod: CPTII,S$GLB,, | Performed by: NURSE PRACTITIONER

## 2022-03-22 PROCEDURE — 1160F RVW MEDS BY RX/DR IN RCRD: CPT | Mod: CPTII,S$GLB,, | Performed by: NURSE PRACTITIONER

## 2022-03-22 PROCEDURE — 99214 OFFICE O/P EST MOD 30 MIN: CPT | Mod: 25,S$GLB,, | Performed by: NURSE PRACTITIONER

## 2022-03-22 PROCEDURE — 3078F PR MOST RECENT DIASTOLIC BLOOD PRESSURE < 80 MM HG: ICD-10-PCS | Mod: CPTII,S$GLB,, | Performed by: NURSE PRACTITIONER

## 2022-03-22 PROCEDURE — 99999 PR PBB SHADOW E&M-EST. PATIENT-LVL IV: ICD-10-PCS | Mod: PBBFAC,,, | Performed by: NURSE PRACTITIONER

## 2022-03-22 PROCEDURE — 3288F FALL RISK ASSESSMENT DOCD: CPT | Mod: CPTII,S$GLB,, | Performed by: NURSE PRACTITIONER

## 2022-03-22 PROCEDURE — 1159F MED LIST DOCD IN RCRD: CPT | Mod: CPTII,S$GLB,, | Performed by: NURSE PRACTITIONER

## 2022-03-22 PROCEDURE — 1159F PR MEDICATION LIST DOCUMENTED IN MEDICAL RECORD: ICD-10-PCS | Mod: CPTII,S$GLB,, | Performed by: NURSE PRACTITIONER

## 2022-03-22 PROCEDURE — 1101F PR PT FALLS ASSESS DOC 0-1 FALLS W/OUT INJ PAST YR: ICD-10-PCS | Mod: CPTII,S$GLB,, | Performed by: NURSE PRACTITIONER

## 2022-03-22 PROCEDURE — 99999 PR PBB SHADOW E&M-EST. PATIENT-LVL IV: CPT | Mod: PBBFAC,,, | Performed by: NURSE PRACTITIONER

## 2022-03-22 PROCEDURE — 3288F PR FALLS RISK ASSESSMENT DOCUMENTED: ICD-10-PCS | Mod: CPTII,S$GLB,, | Performed by: NURSE PRACTITIONER

## 2022-03-22 PROCEDURE — 1160F PR REVIEW ALL MEDS BY PRESCRIBER/CLIN PHARMACIST DOCUMENTED: ICD-10-PCS | Mod: CPTII,S$GLB,, | Performed by: NURSE PRACTITIONER

## 2022-03-22 PROCEDURE — 99214 PR OFFICE/OUTPT VISIT, EST, LEVL IV, 30-39 MIN: ICD-10-PCS | Mod: 25,S$GLB,, | Performed by: NURSE PRACTITIONER

## 2022-03-22 PROCEDURE — 1126F PR PAIN SEVERITY QUANTIFIED, NO PAIN PRESENT: ICD-10-PCS | Mod: CPTII,S$GLB,, | Performed by: NURSE PRACTITIONER

## 2022-03-22 PROCEDURE — 3078F DIAST BP <80 MM HG: CPT | Mod: CPTII,S$GLB,, | Performed by: NURSE PRACTITIONER

## 2022-03-22 PROCEDURE — 1101F PT FALLS ASSESS-DOCD LE1/YR: CPT | Mod: CPTII,S$GLB,, | Performed by: NURSE PRACTITIONER

## 2022-03-22 RX ORDER — HEPARIN 100 UNIT/ML
500 SYRINGE INTRAVENOUS
Status: CANCELLED | OUTPATIENT
Start: 2022-03-22

## 2022-03-22 RX ORDER — SODIUM CHLORIDE 0.9 % (FLUSH) 0.9 %
10 SYRINGE (ML) INJECTION
Status: CANCELLED | OUTPATIENT
Start: 2022-03-22

## 2022-03-22 RX ORDER — SODIUM CHLORIDE 0.9 % (FLUSH) 0.9 %
10 SYRINGE (ML) INJECTION
Status: CANCELLED | OUTPATIENT
Start: 2022-04-08

## 2022-03-22 RX ORDER — HEPARIN 100 UNIT/ML
500 SYRINGE INTRAVENOUS
Status: CANCELLED | OUTPATIENT
Start: 2022-04-08

## 2022-03-22 NOTE — ASSESSMENT & PLAN NOTE
Decline in iron levels in the interim. VCE w/o evidence of bleeding. Hg 7.7>>10.7. No EPO since 1/2022. Patient denies anemia symptoms.     Retacrit 40K units today. Repeat in 2 weeks. Injectafer Q 7 days x 2. F/u 4 weeks with cbc, iron studies. Discussed in detail S&s to report sooner

## 2022-03-22 NOTE — PROGRESS NOTES
Subjective:       Patient ID: Jake Ortiz is a 81 y.o. male.    Chief Complaint: f/u anemia    HPI: 81 y.o male with CKD, anemia, iron deficiency presenting today for follow up of his anemia following recent initiation of Retacrit injections q.2 weeks. Patient has known h/o iron deficiency with unremarkable EGD/Colonoscopy. VCE normal. Denies anemia symptoms. Reports chronic intermittent bright red bleeding in stools. Prior occult stool testing negative. Prior Colonoscopy did show hemorrhoids.       Social History     Socioeconomic History    Marital status:     Number of children: 0   Occupational History    Occupation: retired     Employer: [employer]   Tobacco Use    Smoking status: Former Smoker     Packs/day: 1.50     Years: 20.00     Pack years: 30.00     Types: Cigarettes     Start date:      Quit date:      Years since quittin.2    Smokeless tobacco: Never Used   Substance and Sexual Activity    Alcohol use: No    Drug use: No    Sexual activity: Not Currently     Social Determinants of Health     Financial Resource Strain: Low Risk     Difficulty of Paying Living Expenses: Not hard at all   Food Insecurity: No Food Insecurity    Worried About Running Out of Food in the Last Year: Never true    Ran Out of Food in the Last Year: Never true   Transportation Needs: No Transportation Needs    Lack of Transportation (Medical): No    Lack of Transportation (Non-Medical): No   Physical Activity: Inactive    Days of Exercise per Week: 0 days    Minutes of Exercise per Session: 0 min   Stress: No Stress Concern Present    Feeling of Stress : Only a little   Social Connections: Moderately Integrated    Frequency of Communication with Friends and Family: More than three times a week    Frequency of Social Gatherings with Friends and Family: More than three times a week    Attends Congregational Services: Never    Active Member of Clubs or Organizations: Yes    Attends Club  or Organization Meetings: More than 4 times per year    Marital Status:    Housing Stability: Low Risk     Unable to Pay for Housing in the Last Year: No    Number of Places Lived in the Last Year: 1    Unstable Housing in the Last Year: No       Past Medical History:   Diagnosis Date    Abnormal PFT     Anemia     Arthritis     Atrial fibrillation 10/19/2017    Back pain     Congestive heart failure 3/5/2018    Coronary artery disease     DM (diabetes mellitus) 2018     am 06/23/2020    DM (diabetes mellitus) 2016     am 08/17/2021    Hyperlipemia     Hypertension     Myocardial infarction     Obesity     NASREEN (obstructive sleep apnea) 6/5/2018    Prostate cancer 2015    Tobacco dependence     Type 2 diabetes mellitus        Family History   Problem Relation Age of Onset    Heart attack Mother     Diabetes Mother     Heart disease Mother     Cataracts Mother     Stroke Father     Heart disease Father     Heart disease Brother        Past Surgical History:   Procedure Laterality Date    COLONOSCOPY N/A 9/22/2020    Procedure: COLONOSCOPY;  Surgeon: Javier Farmer MD;  Location: [facility];  Service: Endoscopy;  Laterality: N/A;    CORONARY ARTERY BYPASS GRAFT  1987    EPIDURAL STEROID INJECTION N/A 11/22/2019    Procedure: Lumbar L5/S1 IL XUAN;  Surgeon: Tacho Trivedi MD;  Location: [facility];  Service: Pain Management;  Laterality: N/A;    EPIDURAL STEROID INJECTION N/A 1/6/2020    Procedure: Lumbar L5/S1 IL XUAN;  Surgeon: Tacho Trivedi MD;  Location: [facility];  Service: Pain Management;  Laterality: N/A;    EPIDURAL STEROID INJECTION N/A 2/10/2020    Procedure: Caudal XUAN;  Surgeon: Tacho Trivedi MD;  Location: [facility];  Service: Pain Management;  Laterality: N/A;    ESOPHAGOGASTRODUODENOSCOPY N/A 9/22/2020    Procedure: EGD (ESOPHAGOGASTRODUODENOSCOPY);  Surgeon: Javier Farmer MD;  Location: [facility];  Service:  Endoscopy;  Laterality: N/A;    INJECTION OF ANESTHETIC AGENT AROUND MEDIAL BRANCH NERVES INNERVATING LUMBAR FACET JOINT Bilateral 10/12/2020    Procedure: Bilateral L3-5 MBB;  Surgeon: Tacho Trivedi MD;  Location: [facility];  Service: Pain Management;  Laterality: Bilateral;    INJECTION OF ANESTHETIC AGENT AROUND MEDIAL BRANCH NERVES INNERVATING LUMBAR FACET JOINT Bilateral 12/21/2020    Procedure: Bilateral L3-5 MBB with steroid;  Surgeon: Tacho Trivedi MD;  Location: [facility];  Service: Pain Management;  Laterality: Bilateral;    INTRALUMINAL GASTROINTESTINAL TRACT IMAGING VIA CAPSULE N/A 12/29/2021    Procedure: IMAGING PROCEDURE, GI TRACT, INTRALUMINAL, VIA CAPSULE;  Surgeon: Tahir Geronimo RN;  Location: [facility];  Service: Endoscopy;  Laterality: N/A;    PROSTATE SURGERY      prostate radiation    RADIOFREQUENCY THERMOCOAGULATION Left 6/4/2021    Procedure: Left L3-5 Lumbar RFA;  Surgeon: Tacho Trivedi MD;  Location: [facility];  Service: Pain Management;  Laterality: Left;    RADIOFREQUENCY THERMOCOAGULATION Right 6/18/2021    Procedure: Right L3-5 Lumbar RFA;  Surgeon: Tacho Trivedi MD;  Location: [facility];  Service: Pain Management;  Laterality: Right;    SPINE SURGERY      fusion    TONSILLECTOMY         Review of Systems   Constitutional: Negative for activity change, appetite change, chills, fatigue, fever and unexpected weight change.   HENT: Negative for congestion, mouth sores, nosebleeds, sore throat, trouble swallowing and voice change.    Eyes: Negative for visual disturbance.   Respiratory: Negative for cough, chest tightness, shortness of breath and wheezing.    Cardiovascular: Negative for chest pain and leg swelling.   Gastrointestinal: Negative for abdominal distention, abdominal pain, blood in stool, constipation, diarrhea, nausea and vomiting.   Genitourinary: Negative for difficulty urinating, dysuria and hematuria.   Musculoskeletal: Negative for  arthralgias, back pain and myalgias.   Skin: Negative for pallor, rash and wound.   Neurological: Negative for dizziness, syncope, weakness and headaches.   Hematological: Negative for adenopathy. Does not bruise/bleed easily.   Psychiatric/Behavioral: The patient is not nervous/anxious.    All other systems reviewed and are negative.        Medication List with Changes/Refills   Current Medications    ACETAMINOPHEN (TYLENOL) 500 MG TABLET    Take 500 mg by mouth every 6 (six) hours as needed for Pain.    ALBUTEROL (ACCUNEB) 1.25 MG/3 ML NEBU    Take 3 mLs (1.25 mg total) by nebulization every 6 (six) hours as needed (cough and SOB). Rescue    APIXABAN (ELIQUIS) 2.5 MG TAB    Take 1 tablet (2.5 mg total) by mouth 2 (two) times daily.    ASPIRIN 81 MG CHEW    Take 1 tablet (81 mg total) by mouth once daily.    ATORVASTATIN (LIPITOR) 80 MG TABLET    One tablet daily    BLOOD SUGAR DIAGNOSTIC STRP    Check blood glucose levels daily in the AM fasting and 1-2 times more daily.    BLOOD-GLUCOSE METER KIT    Use as instructed    CHOLECALCIFEROL, VITAMIN D3, (VITAMIN D3) 25 MCG (1,000 UNIT) CAPSULE    Take 5,000 Units by mouth once daily.    CLONAZEPAM (KLONOPIN) 1 MG TABLET    Take 1 tablet (1 mg total) by mouth nightly as needed for Anxiety.    FERROUS SULFATE (FEOSOL) 325 MG (65 MG IRON) TAB TABLET    Take 325 mg by mouth daily with breakfast.    FLUTICASONE PROPIONATE (FLONASE) 50 MCG/ACTUATION NASAL SPRAY    2 sprays (100 mcg total) by Each Nostril route once daily.    FUROSEMIDE (LASIX) 20 MG TABLET    Take 1 tablet (20 mg total) by mouth 2 (two) times daily. Can double to 2 tablets twice a day for 3 days for more swelling/fluid build up    LANCETS MISC    Check blood glucose levels daily in the AM fasting and 1-2 times more daily.    LEVOCETIRIZINE (XYZAL) 5 MG TABLET    TAKE 1 TABLET EVERY EVENING    LOSARTAN (COZAAR) 50 MG TABLET    Take 1 tablet (50 mg total) by mouth once daily.    MAGNESIUM OXIDE (MAG-OX) 400  MG (241.3 MG MAGNESIUM) TABLET    Take 1 tablet (400 mg total) by mouth once daily.    MULTIVITAMIN CAPSULE    Take 1 capsule by mouth once daily.    PANTOPRAZOLE (PROTONIX) 40 MG TABLET    Take 1 tablet (40 mg total) by mouth once daily.    POTASSIUM CHLORIDE SA (K-DUR,KLOR-CON) 20 MEQ TABLET    Take 1 tablet (20 mEq total) by mouth once daily.    RETACRIT 10,000 UNIT/ML IMJECTION        SPIRONOLACTONE (ALDACTONE) 25 MG TABLET    Take 1 tablet (25 mg total) by mouth once daily.     Objective:     Vitals:    03/22/22 0932   BP: 118/65   Pulse: 86   Temp: 97.2 °F (36.2 °C)     Lab Results   Component Value Date    WBC 4.98 03/17/2022    HGB 7.7 (L) 03/17/2022    HCT 24.6 (L) 03/17/2022     (H) 03/17/2022     03/17/2022       BMP  Lab Results   Component Value Date     03/17/2022    K 3.9 03/17/2022     03/17/2022    CO2 23 03/17/2022    BUN 22 03/17/2022    CREATININE 1.8 (H) 03/17/2022    CALCIUM 8.7 03/17/2022    ANIONGAP 9 03/17/2022    ESTGFRAFRICA 40 (A) 03/17/2022    EGFRNONAA 35 (A) 03/17/2022     Lab Results   Component Value Date    ALT 8 (L) 03/17/2022    AST 15 03/17/2022    GGT 58 (H) 08/08/2018    ALKPHOS 81 03/17/2022    BILITOT 0.3 03/17/2022     Lab Results   Component Value Date    IRON 57 03/17/2022    TIBC 299 03/17/2022    FERRITIN 105 03/17/2022         Physical Exam  Vitals reviewed.   Constitutional:       Appearance: He is well-developed.   HENT:      Head: Normocephalic.      Right Ear: External ear normal.      Left Ear: External ear normal.      Nose: Nose normal.   Eyes:      General: Lids are normal. No scleral icterus.        Right eye: No discharge.         Left eye: No discharge.      Conjunctiva/sclera: Conjunctivae normal.   Neck:      Thyroid: No thyroid mass.   Cardiovascular:      Rate and Rhythm: Normal rate and regular rhythm.      Heart sounds: Normal heart sounds. No murmur heard.  Pulmonary:      Effort: Pulmonary effort is normal. No respiratory  distress.      Breath sounds: Normal breath sounds. No wheezing or rales.   Abdominal:      General: Bowel sounds are normal. There is no distension.      Palpations: Abdomen is soft.      Tenderness: There is no abdominal tenderness.   Genitourinary:     Comments: deferred  Musculoskeletal:         General: Normal range of motion.      Cervical back: Normal range of motion.   Skin:     General: Skin is warm and dry.   Neurological:      Mental Status: He is alert and oriented to person, place, and time.   Psychiatric:         Speech: Speech normal.         Behavior: Behavior normal. Behavior is cooperative.         Thought Content: Thought content normal.          Assessment:     Problem List Items Addressed This Visit        Renal/    Anemia associated with stage 4 chronic renal failure - Primary     Decline in iron levels in the interim. VCE w/o evidence of bleeding. Hg 7.7>>10.7. No EPO since 1/2022. Patient denies anemia symptoms.     Retacrit 40K units today. Repeat in 2 weeks. Injectafer Q 7 days x 2. F/u 4 weeks with cbc, iron studies. Discussed in detail S&s to report sooner           Relevant Orders    CBC Auto Differential    Iron and TIBC    Ferritin       Oncology    Iron deficiency anemia due to chronic blood loss    Relevant Orders    CBC Auto Differential    Iron and TIBC    Ferritin            Plan:     Anemia associated with stage 4 chronic renal failure  -     CBC Auto Differential; Future; Expected date: 03/22/2022  -     Iron and TIBC; Future; Expected date: 03/22/2022  -     Ferritin; Future; Expected date: 03/22/2022    Iron deficiency anemia due to chronic blood loss  -     CBC Auto Differential; Future; Expected date: 03/22/2022  -     Iron and TIBC; Future; Expected date: 03/22/2022  -     Ferritin; Future; Expected date: 03/22/2022          DANIEL Shearer

## 2022-03-25 ENCOUNTER — INFUSION (OUTPATIENT)
Dept: INFUSION THERAPY | Facility: HOSPITAL | Age: 82
End: 2022-03-25
Attending: INTERNAL MEDICINE
Payer: MEDICARE

## 2022-03-25 VITALS
TEMPERATURE: 98 F | OXYGEN SATURATION: 98 % | DIASTOLIC BLOOD PRESSURE: 64 MMHG | HEART RATE: 85 BPM | RESPIRATION RATE: 18 BRPM | SYSTOLIC BLOOD PRESSURE: 118 MMHG

## 2022-03-25 DIAGNOSIS — D63.1 ANEMIA ASSOCIATED WITH STAGE 4 CHRONIC RENAL FAILURE: Primary | ICD-10-CM

## 2022-03-25 DIAGNOSIS — N18.4 ANEMIA ASSOCIATED WITH STAGE 4 CHRONIC RENAL FAILURE: Primary | ICD-10-CM

## 2022-03-25 PROCEDURE — 63600175 PHARM REV CODE 636 W HCPCS: Mod: JG,EC | Performed by: INTERNAL MEDICINE

## 2022-03-25 PROCEDURE — 96372 THER/PROPH/DIAG INJ SC/IM: CPT

## 2022-03-25 RX ADMIN — EPOETIN ALFA-EPBX 40000 UNITS: 40000 INJECTION, SOLUTION INTRAVENOUS; SUBCUTANEOUS at 10:03

## 2022-04-03 ENCOUNTER — PATIENT MESSAGE (OUTPATIENT)
Dept: PAIN MEDICINE | Facility: CLINIC | Age: 82
End: 2022-04-03
Payer: MEDICARE

## 2022-04-04 NOTE — TELEPHONE ENCOUNTER
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 We have done lumbar RFA in the past, which I believed helped him. We can either repeat this or have him come in for clinic visit with Dav or Selene. Sounds like might need to see Dav? But can offer either if too far out.

## 2022-04-05 ENCOUNTER — PATIENT MESSAGE (OUTPATIENT)
Dept: PAIN MEDICINE | Facility: CLINIC | Age: 82
End: 2022-04-05
Payer: MEDICARE

## 2022-04-07 ENCOUNTER — INFUSION (OUTPATIENT)
Dept: INFUSION THERAPY | Facility: HOSPITAL | Age: 82
End: 2022-04-07
Attending: INTERNAL MEDICINE
Payer: MEDICARE

## 2022-04-07 VITALS
SYSTOLIC BLOOD PRESSURE: 116 MMHG | RESPIRATION RATE: 18 BRPM | DIASTOLIC BLOOD PRESSURE: 68 MMHG | TEMPERATURE: 97 F | HEART RATE: 63 BPM | OXYGEN SATURATION: 99 %

## 2022-04-07 DIAGNOSIS — D63.1 ANEMIA ASSOCIATED WITH STAGE 4 CHRONIC RENAL FAILURE: ICD-10-CM

## 2022-04-07 DIAGNOSIS — N18.4 ANEMIA ASSOCIATED WITH STAGE 4 CHRONIC RENAL FAILURE: ICD-10-CM

## 2022-04-07 DIAGNOSIS — D50.0 IRON DEFICIENCY ANEMIA DUE TO CHRONIC BLOOD LOSS: Primary | ICD-10-CM

## 2022-04-07 PROCEDURE — 63600175 PHARM REV CODE 636 W HCPCS: Mod: JG | Performed by: NURSE PRACTITIONER

## 2022-04-07 PROCEDURE — 96365 THER/PROPH/DIAG IV INF INIT: CPT

## 2022-04-07 PROCEDURE — 96413 CHEMO IV INFUSION 1 HR: CPT

## 2022-04-07 PROCEDURE — 96372 THER/PROPH/DIAG INJ SC/IM: CPT

## 2022-04-07 RX ORDER — SODIUM CHLORIDE 0.9 % (FLUSH) 0.9 %
10 SYRINGE (ML) INJECTION
Status: DISCONTINUED | OUTPATIENT
Start: 2022-04-07 | End: 2022-04-07 | Stop reason: HOSPADM

## 2022-04-07 RX ADMIN — EPOETIN ALFA-EPBX 40000 UNITS: 40000 INJECTION, SOLUTION INTRAVENOUS; SUBCUTANEOUS at 11:04

## 2022-04-07 RX ADMIN — FERRIC CARBOXYMALTOSE INJECTION 750 MG: 50 INJECTION, SOLUTION INTRAVENOUS at 11:04

## 2022-04-07 NOTE — NURSING
1222 Retacrit Injection given without difficulties.Bandaid applied.  Patient verbalized understanding and will notify nurse with any complaints.  Injectafer also given patient tolerated well. Discharged without distress or complaints.

## 2022-04-07 NOTE — DISCHARGE INSTRUCTIONS
.THANKS FOR ALLOWING ME TO CARE FOR YOU!!!! ~Vilma          THANKS FOR CHOOSING [facility]!!!        [facility]  [address] [facility].  or  [id number] [place] [address] Drive  [phone] phone     [phone] fax  Hours of Operation: Monday- Friday 8:00am- 5:00pm  After hours phone  [phone]  Hematology / Oncology Physicians on call      NEGIN [address] Dr., Dr., NP    Please call with any concerns regarding your appointment today.          .FALL PREVENTION   Falls often occur due to slipping, tripping or losing your balance. Here are ways to reduce your risk of falling again.   Was there anything that caused your fall that can be fixed, removed or replaced?   Make your home safe by keeping walkways clear of objects you may trip over.   Use non-slip pads under rugs.   Do not walk in poorly lit areas.   Do not stand on chairs or wobbly ladders.   Use caution when reaching overhead or looking upward. This position can cause a loss of balance.   Be sure your shoes fit properly, have non-slip bottoms and are in good condition.   Be cautious when going up and down stairs, curbs, and when walking on uneven sidewalks.   If your balance is poor, consider using a cane or walker.   If your fall was related to alcohol use, stop or limit alcohol intake.   If your fall was related to use of sleeping medicines, talk to your doctor about this. You may need to reduce your dosage at bedtime if you awaken during the night to go to the bathroom.   To reduce the need for nighttime bathroom trips:   Avoid drinking fluids for several hours before going to bed   Empty your bladder before going to bed   Men can keep a urinal at the bedside   © 3276-2423 [facility], [address] [place] [address], [place] [address]. All rights reserved. This information is not intended as a substitute for professional medical care. Always follow your healthcare  professional's instructions.        .WAYS TO HELP PREVENT INFECTION        WASH YOUR HANDS OFTEN DURING THE DAY, ESPECIALLY BEFORE YOU EAT, AFTER USING THE BATHROOM, AND AFTER TOUCHING ANIMALS    STAY AWAY FROM PEOPLE WHO HAVE ILLNESSES YOU CAN CATCH; SUCH AS COLDS, FLU, CHICKEN POX    TRY TO AVOID CROWDS    STAY AWAY FROM CHILDREN WHO RECENTLY HAVE RECEIVED LIVE VIRUS VACCINES    MAINTAIN GOOD MOUTH CARE    DO NOT SQUEEZE OR SCRATCH PIMPLES    CLEAN CUTS & SCRAPES RIGHT AWAY AND DAILY UNTIL HEALED WITH WARM WATER, SOAP & AN ANTISEPTIC    AVOID CONTACT WITH LITTER BOXES, BIRD CAGES, & FISH TANKS    AVOID STANDING WATER, IE., BIRD BATHS, FLOWER POTS/VASES, OR HUMIDIFIERS    WEAR GLOVES WHEN GARDENING OR CLEANING UP AFTER OTHERS, ESPECIALLY BABIES & SMALL CHILDREN    DO NOT EAT RAW FISH, SEAFOOD, MEAT, OR EGGS

## 2022-04-09 ENCOUNTER — PATIENT MESSAGE (OUTPATIENT)
Dept: FAMILY MEDICINE | Facility: CLINIC | Age: 82
End: 2022-04-09
Payer: MEDICARE

## 2022-04-10 ENCOUNTER — PATIENT MESSAGE (OUTPATIENT)
Dept: FAMILY MEDICINE | Facility: CLINIC | Age: 82
End: 2022-04-10
Payer: MEDICARE

## 2022-04-10 DIAGNOSIS — G47.61 PERIODIC LIMB MOVEMENT DISORDER (PLMD): ICD-10-CM

## 2022-04-11 NOTE — TELEPHONE ENCOUNTER
No new care gaps identified.  Powered by [employer]. Reference number: [id number].   4/11/2022 3:11:47 PM CDT

## 2022-04-12 RX ORDER — CLONAZEPAM 1 MG/1
1 TABLET ORAL NIGHTLY PRN
Qty: 90 TABLET | Refills: 0 | Status: SHIPPED | OUTPATIENT
Start: 2022-04-12 | End: 2022-04-14 | Stop reason: SDUPTHER

## 2022-04-13 ENCOUNTER — PATIENT MESSAGE (OUTPATIENT)
Dept: FAMILY MEDICINE | Facility: CLINIC | Age: 82
End: 2022-04-13
Payer: MEDICARE

## 2022-04-13 DIAGNOSIS — G47.61 PERIODIC LIMB MOVEMENT DISORDER (PLMD): ICD-10-CM

## 2022-04-13 RX ORDER — CLONAZEPAM 1 MG/1
1 TABLET ORAL NIGHTLY PRN
Qty: 90 TABLET | Refills: 0 | OUTPATIENT
Start: 2022-04-13 | End: 2022-05-13

## 2022-04-14 ENCOUNTER — INFUSION (OUTPATIENT)
Dept: INFUSION THERAPY | Facility: HOSPITAL | Age: 82
End: 2022-04-14
Attending: INTERNAL MEDICINE
Payer: MEDICARE

## 2022-04-14 ENCOUNTER — PATIENT MESSAGE (OUTPATIENT)
Dept: FAMILY MEDICINE | Facility: CLINIC | Age: 82
End: 2022-04-14
Payer: MEDICARE

## 2022-04-14 VITALS
SYSTOLIC BLOOD PRESSURE: 110 MMHG | DIASTOLIC BLOOD PRESSURE: 58 MMHG | OXYGEN SATURATION: 100 % | TEMPERATURE: 98 F | HEART RATE: 68 BPM | RESPIRATION RATE: 18 BRPM

## 2022-04-14 DIAGNOSIS — G47.61 PERIODIC LIMB MOVEMENT DISORDER (PLMD): ICD-10-CM

## 2022-04-14 DIAGNOSIS — D50.0 IRON DEFICIENCY ANEMIA DUE TO CHRONIC BLOOD LOSS: Primary | ICD-10-CM

## 2022-04-14 PROCEDURE — 96365 THER/PROPH/DIAG IV INF INIT: CPT

## 2022-04-14 PROCEDURE — 25000003 PHARM REV CODE 250: Performed by: NURSE PRACTITIONER

## 2022-04-14 PROCEDURE — 63600175 PHARM REV CODE 636 W HCPCS: Mod: JG | Performed by: NURSE PRACTITIONER

## 2022-04-14 RX ORDER — SODIUM CHLORIDE 0.9 % (FLUSH) 0.9 %
10 SYRINGE (ML) INJECTION
Status: DISCONTINUED | OUTPATIENT
Start: 2022-04-14 | End: 2022-04-14 | Stop reason: HOSPADM

## 2022-04-14 RX ORDER — CLONAZEPAM 1 MG/1
1 TABLET ORAL NIGHTLY PRN
Qty: 90 TABLET | Refills: 0 | Status: SHIPPED | OUTPATIENT
Start: 2022-04-14 | End: 2022-01-01 | Stop reason: SDUPTHER

## 2022-04-14 RX ADMIN — FERRIC CARBOXYMALTOSE INJECTION 750 MG: 50 INJECTION, SOLUTION INTRAVENOUS at 10:04

## 2022-04-14 RX ADMIN — SODIUM CHLORIDE: 9 INJECTION, SOLUTION INTRAVENOUS at 10:04

## 2022-04-14 NOTE — NURSING
Patient to unit today for Injectafer. Patient tolerated well. Discharged without distress or complaints.

## 2022-04-14 NOTE — TELEPHONE ENCOUNTER
No new care gaps identified.  Powered by [employer] by [employer]. Reference number: [id number].   4/14/2022 8:07:46 AM CDT

## 2022-04-16 ENCOUNTER — CLINICAL SUPPORT (OUTPATIENT)
Dept: CARDIOLOGY | Facility: HOSPITAL | Age: 82
End: 2022-04-16
Payer: MEDICARE

## 2022-04-16 DIAGNOSIS — Z95.0 PRESENCE OF CARDIAC PACEMAKER: ICD-10-CM

## 2022-04-16 PROCEDURE — 93296 REM INTERROG EVL PM/IDS: CPT | Performed by: INTERNAL MEDICINE

## 2022-04-20 ENCOUNTER — LAB VISIT (OUTPATIENT)
Dept: LAB | Facility: HOSPITAL | Age: 82
End: 2022-04-20
Attending: NURSE PRACTITIONER
Payer: MEDICARE

## 2022-04-20 DIAGNOSIS — D50.0 IRON DEFICIENCY ANEMIA DUE TO CHRONIC BLOOD LOSS: ICD-10-CM

## 2022-04-20 DIAGNOSIS — D63.1 ANEMIA ASSOCIATED WITH STAGE 4 CHRONIC RENAL FAILURE: ICD-10-CM

## 2022-04-20 DIAGNOSIS — N18.4 ANEMIA ASSOCIATED WITH STAGE 4 CHRONIC RENAL FAILURE: ICD-10-CM

## 2022-04-20 LAB
BASOPHILS # BLD AUTO: 0.07 K/UL (ref 0–0.2)
BASOPHILS NFR BLD: 1.6 % (ref 0–1.9)
DIFFERENTIAL METHOD: ABNORMAL
EOSINOPHIL # BLD AUTO: 0.1 K/UL (ref 0–0.5)
EOSINOPHIL NFR BLD: 1.6 % (ref 0–8)
ERYTHROCYTE [DISTWIDTH] IN BLOOD BY AUTOMATED COUNT: 19.1 % (ref 11.5–14.5)
FERRITIN SERPL-MCNC: 966 NG/ML (ref 20–300)
HCT VFR BLD AUTO: 27.7 % (ref 40–54)
HGB BLD-MCNC: 7.7 G/DL (ref 14–18)
IMM GRANULOCYTES # BLD AUTO: 0.01 K/UL (ref 0–0.04)
IMM GRANULOCYTES NFR BLD AUTO: 0.2 % (ref 0–0.5)
IRON SERPL-MCNC: 99 UG/DL (ref 45–160)
LYMPHOCYTES # BLD AUTO: 0.5 K/UL (ref 1–4.8)
LYMPHOCYTES NFR BLD: 10.5 % (ref 18–48)
MCH RBC QN AUTO: 29.2 PG (ref 27–31)
MCHC RBC AUTO-ENTMCNC: 27.8 G/DL (ref 32–36)
MCV RBC AUTO: 105 FL (ref 82–98)
MONOCYTES # BLD AUTO: 0.6 K/UL (ref 0.3–1)
MONOCYTES NFR BLD: 13.1 % (ref 4–15)
NEUTROPHILS # BLD AUTO: 3.1 K/UL (ref 1.8–7.7)
NEUTROPHILS NFR BLD: 73 % (ref 38–73)
NRBC BLD-RTO: 0 /100 WBC
PLATELET # BLD AUTO: 275 K/UL (ref 150–450)
PMV BLD AUTO: 10.3 FL (ref 9.2–12.9)
RBC # BLD AUTO: 2.64 M/UL (ref 4.6–6.2)
SATURATED IRON: 34 % (ref 20–50)
TOTAL IRON BINDING CAPACITY: 290 UG/DL (ref 250–450)
TRANSFERRIN SERPL-MCNC: 196 MG/DL (ref 200–375)
WBC # BLD AUTO: 4.29 K/UL (ref 3.9–12.7)

## 2022-04-20 PROCEDURE — 84466 ASSAY OF TRANSFERRIN: CPT | Performed by: NURSE PRACTITIONER

## 2022-04-20 PROCEDURE — 85025 COMPLETE CBC W/AUTO DIFF WBC: CPT | Performed by: NURSE PRACTITIONER

## 2022-04-20 PROCEDURE — 82728 ASSAY OF FERRITIN: CPT | Performed by: NURSE PRACTITIONER

## 2022-04-20 PROCEDURE — 36415 COLL VENOUS BLD VENIPUNCTURE: CPT | Mod: PO | Performed by: NURSE PRACTITIONER

## 2022-04-21 NOTE — PROGRESS NOTES
Subjective:       Patient ID: Jake Ortiz is a 81 y.o. male.    Chief Complaint:   1. Anemia associated with stage 4 chronic renal failure  CBC Auto Differential    Comprehensive Metabolic Panel   2. Diarrhea, unspecified type  Stool Exam-Ova,Cysts,Parasites     Current Treatment:  EPOETIN REAGAN (RETACRIT) WEEKLY     Treatment History:  IV Injectafer x 2 doses on 4/7/2022 & 4/14/2022  Retacrit 40,000 units on 3/25/2022    HPI: This is a 81 year old man who initially presented to Hem/Onc in 11/2020 for progressive anemia requiring blood transfusion. Persistent elevation of creatinine, occasional elevation of BUN, and consistently low eGFR indicated he had renal insufficiency and he was diagnosed with anemia secondary to renal failure. He was started on Procrit 20,000 units every 2 weeks and referred to Nephrology.     GI work up done in 9/2020 revealed a colonic polyp and non bleeding internal hemorrhoids; it was recommended he repeat in 5 years. Capsule endoscopy in 12/2021 was unremarkable. SPEP revealed normal total protein and normal pattern. Kappa and lambda light chains were elevated; ratio was WNL.     His primary oncologist is Dr. Nath.     Interval History: Patient presents for follow up on labs after receiving Retacrit and Injectafer in the last few weeks. H&H 7.7 & 27.7. Iron level 99; TIBC 290, iron saturation 34%. Transferrin slightly low at 196; ferritin elevated at 966. He presents alone and states feeling pretty good. Reviewed labs with him; advised him that we will continue Retacrit every 2 weeks. He complains of back pain despite multiple interventions; he sees Dr. Demarco for this. He attempted to see another provider for this but was told he could not switch. He also reports diarrhea that worsened 2 weeks ago; it is controlled with PeptoBismol and oral antidiarrheal tablet. He has not reported this complaint to PCP; advised him to do so today as he has an appointment with his PCP in 2 weeks.  Discussed the potential for dehydration and the effects dehydration can have on kidney function. He verbalizes understanding and agrees to follow up with PCP.     The patient location is: [facility]  The chief complaint leading to consultation is: anemia    Visit type: audiovisual    Face to Face time with patient: 15 minutes  50 minutes of total time spent on the encounter, which includes face to face time and non-face to face time preparing to see the patient (eg, review of tests), Obtaining and/or reviewing separately obtained history, Documenting clinical information in the electronic or other health record, Independently interpreting results (not separately reported) and communicating results to the patient/family/caregiver, or Care coordination (not separately reported).     Each patient to whom he or she provides medical services by telemedicine is:  (1) informed of the relationship between the physician and patient and the respective role of any other health care provider with respect to management of the patient; and (2) notified that he or she may decline to receive medical services by telemedicine and may withdraw from such care at any time.      Social History     Socioeconomic History    Marital status:     Number of children: 0   Occupational History    Occupation: retired     Employer: [employer]   Tobacco Use    Smoking status: Former Smoker     Packs/day: 1.50     Years: 20.00     Pack years: 30.00     Types: Cigarettes     Start date:      Quit date:      Years since quittin.3    Smokeless tobacco: Never Used   Substance and Sexual Activity    Alcohol use: No    Drug use: No    Sexual activity: Not Currently     Social Determinants of Health     Financial Resource Strain: Low Risk     Difficulty of Paying Living Expenses: Not hard at all   Food Insecurity: No Food Insecurity    Worried About Running Out of Food in the Last Year: Never true    Ran Out of Food in the  Last Year: Never true   Transportation Needs: No Transportation Needs    Lack of Transportation (Medical): No    Lack of Transportation (Non-Medical): No   Physical Activity: Inactive    Days of Exercise per Week: 0 days    Minutes of Exercise per Session: 0 min   Stress: Stress Concern Present    Feeling of Stress : Very much   Social Connections: Moderately Integrated    Frequency of Communication with Friends and Family: Three times a week    Frequency of Social Gatherings with Friends and Family: More than three times a week    Attends Sikhism Services: Never    Active Member of Clubs or Organizations: Yes    Attends Club or Organization Meetings: More than 4 times per year    Marital Status:    Housing Stability: Low Risk     Unable to Pay for Housing in the Last Year: No    Number of Places Lived in the Last Year: 1    Unstable Housing in the Last Year: No       Past Medical History:   Diagnosis Date    Abnormal PFT     Anemia     Arthritis     Atrial fibrillation 10/19/2017    Back pain     Congestive heart failure 3/5/2018    Coronary artery disease     DM (diabetes mellitus) 2018     am 06/23/2020    DM (diabetes mellitus) 2016     am 08/17/2021    Hyperlipemia     Hypertension     Myocardial infarction     Obesity     NASREEN (obstructive sleep apnea) 6/5/2018    Prostate cancer 2015    Tobacco dependence     Type 2 diabetes mellitus        Family History   Problem Relation Age of Onset    Heart attack Mother     Diabetes Mother     Heart disease Mother     Cataracts Mother     Stroke Father     Heart disease Father     Heart disease Brother        Past Surgical History:   Procedure Laterality Date    COLONOSCOPY N/A 9/22/2020    Procedure: COLONOSCOPY;  Surgeon: Javier Farmer MD;  Location[facility];  Service: Endoscopy;  Laterality: N/A;    CORONARY ARTERY BYPASS GRAFT  1987    EPIDURAL STEROID INJECTION N/A 11/22/2019    Procedure:  Lumbar L5/S1 IL XUAN;  Surgeon: Tacho Trivedi MD;  Location: [facility];  Service: Pain Management;  Laterality: N/A;    EPIDURAL STEROID INJECTION N/A 1/6/2020    Procedure: Lumbar L5/S1 IL XUAN;  Surgeon: Tacho Trivedi MD;  Location: [facility];  Service: Pain Management;  Laterality: N/A;    EPIDURAL STEROID INJECTION N/A 2/10/2020    Procedure: Caudal XUAN;  Surgeon: Tacho Trivedi MD;  [facility];  Service: Pain Management;  Laterality: N/A;    ESOPHAGOGASTRODUODENOSCOPY N/A 9/22/2020    Procedure: EGD (ESOPHAGOGASTRODUODENOSCOPY);  Surgeon: Javier Farmer MD;  Location[facility];  Service: Endoscopy;  Laterality: N/A;    INJECTION OF ANESTHETIC AGENT AROUND MEDIAL BRANCH NERVES INNERVATING LUMBAR FACET JOINT Bilateral 10/12/2020    Procedure: Bilateral L3-5 MBB;  Surgeon: Tacho Trivedi MD;  Location: [facility];  Service: Pain Management;  Laterality: Bilateral;    INJECTION OF ANESTHETIC AGENT AROUND MEDIAL BRANCH NERVES INNERVATING LUMBAR FACET JOINT Bilateral 12/21/2020    Procedure: Bilateral L3-5 MBB with steroid;  Surgeon: Tacho Trivedi MD;  Location: [facility];  Service: Pain Management;  Laterality: Bilateral;    INTRALUMINAL GASTROINTESTINAL TRACT IMAGING VIA CAPSULE N/A 12/29/2021    Procedure: IMAGING PROCEDURE, GI TRACT, INTRALUMINAL, VIA CAPSULE;  Surgeon: Tahir Geronimo RN;  Location: [facility] END[facility];  Service: Endoscopy;  Laterality: N/A;    PROSTATE SURGERY      prostate radiation    RADIOFREQUENCY THERMOCOAGULATION Left 6/4/2021    Procedure: Left L3-5 Lumbar RFA;  Surgeon: Tacho Trivedi MD;  Location: [facility];  Service: Pain Management;  Laterality: Left;    RADIOFREQUENCY THERMOCOAGULATION Right 6/18/2021    Procedure: Right L3-5 Lumbar RFA;  Surgeon: Tacho Trivedi MD;  [facility];  Service: Pain Management;  Laterality: Right;    SPINE SURGERY      fusion    TONSILLECTOMY         Review of Systems    Constitutional: Negative for appetite change and unexpected weight change.   HENT: Negative for mouth sores.    Eyes: Negative for visual disturbance.   Respiratory: Positive for shortness of breath. Negative for cough.    Cardiovascular: Negative for chest pain.   Gastrointestinal: Positive for diarrhea (relieved by antidiarrheal and PeptoBismol). Negative for abdominal pain.   Endocrine: Negative.    Genitourinary: Negative for frequency.   Musculoskeletal: Positive for back pain.   Skin: Negative for rash.   Allergic/Immunologic: Negative.    Neurological: Negative for headaches.   Hematological: Negative for adenopathy.   Psychiatric/Behavioral: The patient is nervous/anxious.          Medication List with Changes/Refills   Current Medications    ACETAMINOPHEN (TYLENOL) 500 MG TABLET    Take 500 mg by mouth every 6 (six) hours as needed for Pain.    ALBUTEROL (ACCUNEB) 1.25 MG/3 ML NEBU    Take 3 mLs (1.25 mg total) by nebulization every 6 (six) hours as needed (cough and SOB). Rescue    APIXABAN (ELIQUIS) 2.5 MG TAB    Take 1 tablet (2.5 mg total) by mouth 2 (two) times daily.    ASPIRIN 81 MG CHEW    Take 1 tablet (81 mg total) by mouth once daily.    ATORVASTATIN (LIPITOR) 80 MG TABLET    One tablet daily    BLOOD SUGAR DIAGNOSTIC STRP    Check blood glucose levels daily in the AM fasting and 1-2 times more daily.    BLOOD-GLUCOSE METER KIT    Use as instructed    CHOLECALCIFEROL, VITAMIN D3, (VITAMIN D3) 25 MCG (1,000 UNIT) CAPSULE    Take 5,000 Units by mouth once daily.    CLONAZEPAM (KLONOPIN) 1 MG TABLET    Take 1 tablet (1 mg total) by mouth nightly as needed for Anxiety.    FERROUS SULFATE (FEOSOL) 325 MG (65 MG IRON) TAB TABLET    Take 325 mg by mouth daily with breakfast.    FLUTICASONE PROPIONATE (FLONASE) 50 MCG/ACTUATION NASAL SPRAY    2 sprays (100 mcg total) by Each Nostril route once daily.    FUROSEMIDE (LASIX) 20 MG TABLET    Take 1 tablet (20 mg total) by mouth 2 (two) times daily. Can  double to 2 tablets twice a day for 3 days for more swelling/fluid build up    LANCETS MISC    Check blood glucose levels daily in the AM fasting and 1-2 times more daily.    LEVOCETIRIZINE (XYZAL) 5 MG TABLET    TAKE 1 TABLET EVERY EVENING    LOSARTAN (COZAAR) 50 MG TABLET    Take 1 tablet (50 mg total) by mouth once daily.    MAGNESIUM OXIDE (MAG-OX) 400 MG (241.3 MG MAGNESIUM) TABLET    Take 1 tablet (400 mg total) by mouth once daily.    MULTIVITAMIN CAPSULE    Take 1 capsule by mouth once daily.    PANTOPRAZOLE (PROTONIX) 40 MG TABLET    Take 1 tablet (40 mg total) by mouth once daily.    POTASSIUM CHLORIDE SA (K-DUR,KLOR-CON) 20 MEQ TABLET    Take 1 tablet (20 mEq total) by mouth once daily.    RETACRIT 10,000 UNIT/ML IMJECTION        SPIRONOLACTONE (ALDACTONE) 25 MG TABLET    Take 1 tablet (25 mg total) by mouth once daily.     Objective:     Vitals:    04/22/22 1055   BP: 115/65   Pulse: 84   Temp: 98.1 °F (36.7 °C)     Lab Results   Component Value Date    WBC 4.29 04/20/2022    HGB 7.7 (L) 04/20/2022    HCT 27.7 (L) 04/20/2022     (H) 04/20/2022     04/20/2022     Lab Results   Component Value Date    IRON 99 04/20/2022    TIBC 290 04/20/2022    FERRITIN 966 (H) 04/20/2022       Physical Exam     Unable to assess due to virtual visit.    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  Ok to proceed with Retacrit today.   Continue Retacrit every 2 weeks; due next on 5/6/2022.  Follow up in 4 weeks with CBC and Comprehensive Metabolic Panel prior to Retacrit.  Stool for ova & parasites to evaluate cause of diarrhea.  Continue PeptoBismol and oral antidiarrheal tablet for management.  Patient to follow up with PCP regarding this complaint; will forward results of stool sample to PCP.     I will review assessment/plan with collaborating physician Dr. George.    DOMINGO Diaz

## 2022-04-22 ENCOUNTER — INFUSION (OUTPATIENT)
Dept: INFUSION THERAPY | Facility: HOSPITAL | Age: 82
End: 2022-04-22
Attending: INTERNAL MEDICINE
Payer: MEDICARE

## 2022-04-22 ENCOUNTER — OFFICE VISIT (OUTPATIENT)
Dept: HEMATOLOGY/ONCOLOGY | Facility: CLINIC | Age: 82
End: 2022-04-22
Payer: MEDICARE

## 2022-04-22 VITALS
OXYGEN SATURATION: 97 % | BODY MASS INDEX: 32.33 KG/M2 | HEART RATE: 88 BPM | HEIGHT: 69 IN | DIASTOLIC BLOOD PRESSURE: 67 MMHG | SYSTOLIC BLOOD PRESSURE: 121 MMHG | WEIGHT: 218.25 LBS | RESPIRATION RATE: 18 BRPM | TEMPERATURE: 97 F

## 2022-04-22 VITALS
HEIGHT: 69 IN | SYSTOLIC BLOOD PRESSURE: 115 MMHG | BODY MASS INDEX: 32.33 KG/M2 | OXYGEN SATURATION: 99 % | DIASTOLIC BLOOD PRESSURE: 65 MMHG | TEMPERATURE: 98 F | WEIGHT: 218.25 LBS | HEART RATE: 84 BPM

## 2022-04-22 DIAGNOSIS — N18.4 ANEMIA ASSOCIATED WITH STAGE 4 CHRONIC RENAL FAILURE: Primary | ICD-10-CM

## 2022-04-22 DIAGNOSIS — R19.7 DIARRHEA, UNSPECIFIED TYPE: ICD-10-CM

## 2022-04-22 DIAGNOSIS — D63.1 ANEMIA ASSOCIATED WITH STAGE 4 CHRONIC RENAL FAILURE: Primary | ICD-10-CM

## 2022-04-22 PROCEDURE — 3072F PR LOW RISK FOR RETINOPATHY: ICD-10-PCS | Mod: CPTII,95,, | Performed by: NURSE PRACTITIONER

## 2022-04-22 PROCEDURE — 63600175 PHARM REV CODE 636 W HCPCS: Mod: JG,EC | Performed by: NURSE PRACTITIONER

## 2022-04-22 PROCEDURE — 99214 PR OFFICE/OUTPT VISIT, EST, LEVL IV, 30-39 MIN: ICD-10-PCS | Mod: 95,,, | Performed by: NURSE PRACTITIONER

## 2022-04-22 PROCEDURE — 1125F PR PAIN SEVERITY QUANTIFIED, PAIN PRESENT: ICD-10-PCS | Mod: CPTII,95,, | Performed by: NURSE PRACTITIONER

## 2022-04-22 PROCEDURE — 99214 OFFICE O/P EST MOD 30 MIN: CPT | Mod: 95,,, | Performed by: NURSE PRACTITIONER

## 2022-04-22 PROCEDURE — 3078F DIAST BP <80 MM HG: CPT | Mod: CPTII,95,, | Performed by: NURSE PRACTITIONER

## 2022-04-22 PROCEDURE — 96372 THER/PROPH/DIAG INJ SC/IM: CPT

## 2022-04-22 PROCEDURE — 3078F PR MOST RECENT DIASTOLIC BLOOD PRESSURE < 80 MM HG: ICD-10-PCS | Mod: CPTII,95,, | Performed by: NURSE PRACTITIONER

## 2022-04-22 PROCEDURE — 3072F LOW RISK FOR RETINOPATHY: CPT | Mod: CPTII,95,, | Performed by: NURSE PRACTITIONER

## 2022-04-22 PROCEDURE — 3074F SYST BP LT 130 MM HG: CPT | Mod: CPTII,95,, | Performed by: NURSE PRACTITIONER

## 2022-04-22 PROCEDURE — 1125F AMNT PAIN NOTED PAIN PRSNT: CPT | Mod: CPTII,95,, | Performed by: NURSE PRACTITIONER

## 2022-04-22 PROCEDURE — 3074F PR MOST RECENT SYSTOLIC BLOOD PRESSURE < 130 MM HG: ICD-10-PCS | Mod: CPTII,95,, | Performed by: NURSE PRACTITIONER

## 2022-04-22 RX ADMIN — EPOETIN ALFA-EPBX 40000 UNITS: 40000 INJECTION, SOLUTION INTRAVENOUS; SUBCUTANEOUS at 11:04

## 2022-04-22 NOTE — NURSING
Patient given collection cup with patient label. Instructions given to patient on proper collection and transportation method. Patient verbalized understanding and will be bringing stool sample back in to clinic on 04/25/2022 for ova, cysts, parasites test.

## 2022-04-22 NOTE — DISCHARGE INSTRUCTIONS
.[facility]  [address] [address]  [address]  [phone] phone     [phone] fax  Hours of Operation: Monday- Friday 8:00am- 5:00pm  After hours phone  [phone]  Hematology / Oncology Physicians on call    Dr. Portillo Swenson      Nurse Practitioners:    NEGIN Lentz NP Phaon Dunbar, NP Jessica Porter, PA      Please don't hesitate to call if you have any concerns.        PAST MEDICAL HISTORY:  Chronic back pain     Drug abuse     History of DVT (deep vein thrombosis)

## 2022-04-25 ENCOUNTER — PATIENT MESSAGE (OUTPATIENT)
Dept: FAMILY MEDICINE | Facility: CLINIC | Age: 82
End: 2022-04-25
Payer: MEDICARE

## 2022-04-25 ENCOUNTER — PATIENT MESSAGE (OUTPATIENT)
Dept: PAIN MEDICINE | Facility: CLINIC | Age: 82
End: 2022-04-25
Payer: MEDICARE

## 2022-04-26 ENCOUNTER — PATIENT MESSAGE (OUTPATIENT)
Dept: CARDIOLOGY | Facility: CLINIC | Age: 82
End: 2022-04-26
Payer: MEDICARE

## 2022-04-26 ENCOUNTER — HOSPITAL ENCOUNTER (EMERGENCY)
Facility: HOSPITAL | Age: 82
Discharge: HOME | End: 2022-04-26
Attending: EMERGENCY MEDICINE
Payer: MEDICARE

## 2022-04-26 VITALS
DIASTOLIC BLOOD PRESSURE: 62 MMHG | RESPIRATION RATE: 20 BRPM | TEMPERATURE: 98 F | WEIGHT: 218.25 LBS | OXYGEN SATURATION: 100 % | HEART RATE: 63 BPM | BODY MASS INDEX: 32.23 KG/M2 | SYSTOLIC BLOOD PRESSURE: 119 MMHG

## 2022-04-26 DIAGNOSIS — R53.1 WEAKNESS: ICD-10-CM

## 2022-04-26 DIAGNOSIS — D64.9 ANEMIA, UNSPECIFIED TYPE: Primary | ICD-10-CM

## 2022-04-26 LAB
ABO + RH BLD: NORMAL
ALBUMIN SERPL BCP-MCNC: 3.5 G/DL (ref 3.5–5.2)
ALP SERPL-CCNC: 83 U/L (ref 55–135)
ALT SERPL W/O P-5'-P-CCNC: 13 U/L (ref 10–44)
ANION GAP SERPL CALC-SCNC: 11 MMOL/L (ref 8–16)
AST SERPL-CCNC: 23 U/L (ref 10–40)
BASOPHILS # BLD AUTO: 0.06 K/UL (ref 0–0.2)
BASOPHILS NFR BLD: 1.6 % (ref 0–1.9)
BILIRUB SERPL-MCNC: 0.4 MG/DL (ref 0.1–1)
BILIRUB UR QL STRIP: NEGATIVE
BLD GP AB SCN CELLS X3 SERPL QL: NORMAL
BNP SERPL-MCNC: 507 PG/ML (ref 0–99)
BUN SERPL-MCNC: 26 MG/DL (ref 8–23)
CALCIUM SERPL-MCNC: 8.3 MG/DL (ref 8.7–10.5)
CHLORIDE SERPL-SCNC: 113 MMOL/L (ref 95–110)
CLARITY UR: CLEAR
CO2 SERPL-SCNC: 17 MMOL/L (ref 23–29)
COLOR UR: YELLOW
CREAT SERPL-MCNC: 1.8 MG/DL (ref 0.5–1.4)
DIFFERENTIAL METHOD: ABNORMAL
EOSINOPHIL # BLD AUTO: 0.1 K/UL (ref 0–0.5)
EOSINOPHIL NFR BLD: 1.6 % (ref 0–8)
ERYTHROCYTE [DISTWIDTH] IN BLOOD BY AUTOMATED COUNT: 20 % (ref 11.5–14.5)
EST. GFR  (AFRICAN AMERICAN): 40 ML/MIN/1.73 M^2
EST. GFR  (NON AFRICAN AMERICAN): 35 ML/MIN/1.73 M^2
GLUCOSE SERPL-MCNC: 156 MG/DL (ref 70–110)
GLUCOSE UR QL STRIP: NEGATIVE
HCT VFR BLD AUTO: 30.9 % (ref 40–54)
HGB BLD-MCNC: 8.9 G/DL (ref 14–18)
HGB UR QL STRIP: NEGATIVE
IMM GRANULOCYTES # BLD AUTO: 0.01 K/UL (ref 0–0.04)
IMM GRANULOCYTES NFR BLD AUTO: 0.3 % (ref 0–0.5)
KETONES UR QL STRIP: NEGATIVE
LEUKOCYTE ESTERASE UR QL STRIP: NEGATIVE
LYMPHOCYTES # BLD AUTO: 0.3 K/UL (ref 1–4.8)
LYMPHOCYTES NFR BLD: 8.4 % (ref 18–48)
MCH RBC QN AUTO: 29.6 PG (ref 27–31)
MCHC RBC AUTO-ENTMCNC: 28.8 G/DL (ref 32–36)
MCV RBC AUTO: 103 FL (ref 82–98)
MONOCYTES # BLD AUTO: 0.4 K/UL (ref 0.3–1)
MONOCYTES NFR BLD: 11.2 % (ref 4–15)
NEUTROPHILS # BLD AUTO: 2.8 K/UL (ref 1.8–7.7)
NEUTROPHILS NFR BLD: 76.9 % (ref 38–73)
NITRITE UR QL STRIP: NEGATIVE
NRBC BLD-RTO: 0 /100 WBC
PH UR STRIP: 6 [PH] (ref 5–8)
PLATELET # BLD AUTO: 313 K/UL (ref 150–450)
PMV BLD AUTO: 9.7 FL (ref 9.2–12.9)
POTASSIUM SERPL-SCNC: 3.9 MMOL/L (ref 3.5–5.1)
PROT SERPL-MCNC: 6.5 G/DL (ref 6–8.4)
PROT UR QL STRIP: NEGATIVE
RBC # BLD AUTO: 3.01 M/UL (ref 4.6–6.2)
SODIUM SERPL-SCNC: 141 MMOL/L (ref 136–145)
SP GR UR STRIP: 1.01 (ref 1–1.03)
TROPONIN I SERPL DL<=0.01 NG/ML-MCNC: 0.01 NG/ML (ref 0–0.03)
URN SPEC COLLECT METH UR: NORMAL
UROBILINOGEN UR STRIP-ACNC: NEGATIVE EU/DL
WBC # BLD AUTO: 3.67 K/UL (ref 3.9–12.7)

## 2022-04-26 PROCEDURE — 25000003 PHARM REV CODE 250: Performed by: EMERGENCY MEDICINE

## 2022-04-26 PROCEDURE — 85025 COMPLETE CBC W/AUTO DIFF WBC: CPT | Performed by: EMERGENCY MEDICINE

## 2022-04-26 PROCEDURE — 80053 COMPREHEN METABOLIC PANEL: CPT | Performed by: EMERGENCY MEDICINE

## 2022-04-26 PROCEDURE — 93010 EKG 12-LEAD: ICD-10-PCS | Mod: ,,, | Performed by: INTERNAL MEDICINE

## 2022-04-26 PROCEDURE — 93010 ELECTROCARDIOGRAM REPORT: CPT | Mod: ,,, | Performed by: INTERNAL MEDICINE

## 2022-04-26 PROCEDURE — 84484 ASSAY OF TROPONIN QUANT: CPT | Performed by: EMERGENCY MEDICINE

## 2022-04-26 PROCEDURE — 93005 ELECTROCARDIOGRAM TRACING: CPT

## 2022-04-26 PROCEDURE — 81003 URINALYSIS AUTO W/O SCOPE: CPT | Performed by: EMERGENCY MEDICINE

## 2022-04-26 PROCEDURE — 99285 EMERGENCY DEPT VISIT HI MDM: CPT | Mod: 25

## 2022-04-26 PROCEDURE — 83880 ASSAY OF NATRIURETIC PEPTIDE: CPT | Performed by: EMERGENCY MEDICINE

## 2022-04-26 PROCEDURE — 86850 RBC ANTIBODY SCREEN: CPT | Performed by: EMERGENCY MEDICINE

## 2022-04-26 RX ORDER — TRAMADOL HYDROCHLORIDE 50 MG/1
12.5 TABLET ORAL EVERY 6 HOURS PRN
COMMUNITY
End: 2022-05-11

## 2022-04-26 RX ORDER — HYDROCODONE BITARTRATE AND ACETAMINOPHEN 5; 325 MG/1; MG/1
1 TABLET ORAL
Status: COMPLETED | OUTPATIENT
Start: 2022-04-26 | End: 2022-04-26

## 2022-04-26 RX ADMIN — HYDROCODONE BITARTRATE AND ACETAMINOPHEN 1 TABLET: 5; 325 TABLET ORAL at 04:04

## 2022-04-26 NOTE — PHARMACY MED REC
"Admission Medication History     The home medication history was taken by Dipak Shah.    You may go to "Admission" then "Reconcile Home Medications" tabs to review and/or act upon these items.      The home medication list has been updated by the Pharmacy department.    Please read ALL comments highlighted in yellow.    Please address this information as you see fit.     Feel free to contact us if you have any questions or require assistance.      The medications listed below were removed from the home medication list. Please reorder if appropriate:  Patient reports no longer taking the following medication(s):   ALBUTEROL 1.25 MG/3 mL NEBULIZER SOLUTION   FERROUS SULFATE 325 MG (65 MG IRON) TABLET   RETACRIT 10,000 unit/mL imjection    Medications listed below were obtained from: Patient/family, Medications brought from home, Analytic software- [employer] and Medical records  (Not in a hospital admission)      Potential issues to be addressed PRIOR TO DISCHARGE: NONE      Dipak Shah, [facility]  Spectralink [phone]      Current Outpatient Medications on File Prior to Encounter   Medication Sig Dispense Refill Last Dose    acetaminophen (TYLENOL) 650 MG TbSR Take 1,300 mg by mouth every 6 (six) hours as needed for Pain.   4/26/2022 at Unknown time    apixaban (ELIQUIS) 2.5 mg Tab Take 1 tablet (2.5 mg total) by mouth 2 (two) times daily. 180 tablet 1 4/26/2022 at Unknown time    aspirin 81 MG Chew Take 1 tablet (81 mg total) by mouth once daily. 30 tablet 0 4/26/2022 at Unknown time    atorvastatin (LIPITOR) 80 MG tablet One tablet daily (Patient taking differently: Take 80 mg by mouth every evening.) 90 tablet 1 4/25/2022 at Unknown time    cholecalciferol, vitamin D3, (VITAMIN D3) 25 mcg (1,000 unit) capsule Take 1,000 Units by mouth once daily.   4/26/2022 at Unknown time    clonazePAM (KLONOPIN) 1 MG tablet Take 1 tablet (1 mg total) by mouth nightly as needed for Anxiety. 90 tablet 0 4/25/2022 at " Unknown time    fluticasone propionate (FLONASE) 50 mcg/actuation nasal spray 2 sprays (100 mcg total) by Each Nostril route once daily. 48 g 5 4/26/2022 at Unknown time    furosemide (LASIX) 20 MG tablet Take 1 tablet (20 mg total) by mouth 2 (two) times daily. Can double to 2 tablets twice a day for 3 days for more swelling/fluid build up 60 tablet 11 4/26/2022 at Unknown time    krill/om-3/dha/epa/phospho/ast (MEGARED OMEGA-3 KRILL OIL ORAL) Take 1 capsule by mouth every morning.   4/26/2022 at Unknown time    levocetirizine (XYZAL) 5 MG tablet TAKE 1 TABLET EVERY EVENING 90 tablet 1 4/25/2022 at Unknown time    losartan (COZAAR) 50 MG tablet Take 1 tablet (50 mg total) by mouth once daily. 90 tablet 3 4/26/2022 at Unknown time    magnesium oxide (MAG-OX) 400 mg (241.3 mg magnesium) tablet Take 1 tablet (400 mg total) by mouth once daily. 90 tablet 1 4/25/2022 at Unknown time    multivitamin capsule Take 1 capsule by mouth once daily.   4/25/2022 at Unknown time    pantoprazole (PROTONIX) 40 MG tablet Take 1 tablet (40 mg total) by mouth once daily. 90 tablet 3 4/26/2022 at Unknown time    potassium chloride SA (K-DUR,KLOR-CON) 20 MEQ tablet Take 1 tablet (20 mEq total) by mouth once daily. 90 tablet 1 4/25/2022 at Unknown time    spironolactone (ALDACTONE) 25 MG tablet Take 1 tablet (25 mg total) by mouth once daily. 90 tablet 1 4/26/2022 at Unknown time    traMADoL (ULTRAM) 50 mg tablet Take 12.5 mg by mouth every 6 (six) hours as needed for Pain.   4/26/2022 at Unknown time    vit A/vit C/vit E/zinc/copper (OCUVITE PRESERVISION ORAL) Take 1 capsule by mouth every evening.   4/25/2022 at Unknown time    blood sugar diagnostic Strp Check blood glucose levels daily in the AM fasting and 1-2 times more daily. 300 strip 3     lancets Misc Check blood glucose levels daily in the AM fasting and 1-2 times more daily. 300 each 3     [DISCONTINUED] albuterol (ACCUNEB) 1.25 mg/3 mL Nebu Take 3 mLs (1.25 mg  total) by nebulization every 6 (six) hours as needed (cough and SOB). Rescue 1 Box 0     [DISCONTINUED] blood-glucose meter kit Use as instructed 1 each 0     [DISCONTINUED] ferrous sulfate (FEOSOL) 325 mg (65 mg iron) Tab tablet Take 325 mg by mouth daily with breakfast.       [DISCONTINUED] RETACRIT 10,000 unit/mL imjection                                 .

## 2022-04-26 NOTE — ED PROVIDER NOTES
SCRIBE #1 NOTE: I, Catarino Campa, am scribing for, and in the presence of, Diamond Mann MD. I have scribed the HPI, ROS, and PEx.     SCRIBE #2 NOTE: I, Sindy Clark, am scribing for, and in the presence of,  Emma Sheehan MD. I have scribed the remaining portions of the note not scribed by Scribe #1.     History      Chief Complaint   Patient presents with    Fatigue     Pt c/o lower back pain, fatigue worsening since Saturday, loss of appetite, states he feels like his hemoglobin is low again, hx of blood transfusions       Review of patient's allergies indicates:  No Known Allergies     HPI   HPI    4/26/2022, 3:27 PM   History obtained from the patient      History of Present Illness: Jake Ortiz is a 81 y.o. male patient with a PMHx of CHF, CAD, DM, MI, HTN, anemia who presents to the Emergency Department for fatigue, onset 3 days PTA. Symptoms are constant and moderate in severity. No mitigating or exacerbating factors reported. Associated sxs include generalized weakness. Patient denies any fever, chills, n/v/d, SOB, CP, numbness, dizziness, headache, and all other sxs at this time. Pt receives weekly Retacrit injections. Pt currently follows with Dr. Demarco (Pain Management) for chronic back pain. No further complaints or concerns at this time.     Arrival mode: Personal vehicle    PCP: Byron Stevens MD       Past Medical History:  Past Medical History:   Diagnosis Date    Abnormal PFT     Anemia     Arthritis     Atrial fibrillation 10/19/2017    Back pain     Congestive heart failure 3/5/2018    Coronary artery disease     DM (diabetes mellitus) 2018     am 06/23/2020    DM (diabetes mellitus) 2016     am 08/17/2021    Hyperlipemia     Hypertension     Myocardial infarction     Obesity     NASREEN (obstructive sleep apnea) 6/5/2018    Prostate cancer 2015    Tobacco dependence     Type 2 diabetes mellitus        Past Surgical History:  Past Surgical History:   Procedure  Laterality Date    COLONOSCOPY N/A 9/22/2020    Procedure: COLONOSCOPY;  Surgeon: Javier Farmer MD;  Location: [facility] ENDO;  Service: Endoscopy;  Laterality: N/A;    CORONARY ARTERY BYPASS GRAFT  1987    EPIDURAL STEROID INJECTION N/A 11/22/2019    Procedure: Lumbar L5/S1 IL XUAN;  Surgeon: Tacho Trivedi MD;  Location: [facility] PAIN MGT;  Service: Pain Management;  Laterality: N/A;    EPIDURAL STEROID INJECTION N/A 1/6/2020    Procedure: Lumbar L5/S1 IL XUAN;  Surgeon: Tacho Trivedi MD;  [facility];  Service: Pain Management;  Laterality: N/A;    EPIDURAL STEROID INJECTION N/A 2/10/2020    Procedure: Caudal XUAN;  Surgeon: Tacho Trivedi MD;  [facility];  Service: Pain Management;  Laterality: N/A;    ESOPHAGOGASTRODUODENOSCOPY N/A 9/22/2020    Procedure: EGD (ESOPHAGOGASTRODUODENOSCOPY);  Surgeon: Javier Farmer MD;  Location: [facility];  Service: Endoscopy;  Laterality: N/A;    INJECTION OF ANESTHETIC AGENT AROUND MEDIAL BRANCH NERVES INNERVATING LUMBAR FACET JOINT Bilateral 10/12/2020    Procedure: Bilateral L3-5 MBB;  Surgeon: Tacho Trivedi MD;  Location: [facility] PAIN MGT;  Service: Pain Management;  Laterality: Bilateral;    INJECTION OF ANESTHETIC AGENT AROUND MEDIAL BRANCH NERVES INNERVATING LUMBAR FACET JOINT Bilateral 12/21/2020    Procedure: Bilateral L3-5 MBB with steroid;  Surgeon: Tacho Trivedi MD;  Location: [facility] PAIN MGT;  Service: Pain Management;  Laterality: Bilateral;    INTRALUMINAL GASTROINTESTINAL TRACT IMAGING VIA CAPSULE N/A 12/29/2021    Procedure: IMAGING PROCEDURE, GI TRACT, INTRALUMINAL, VIA CAPSULE;  Surgeon: Tahir Geronimo RN;  Location: [facility] ENDO;  Service: Endoscopy;  Laterality: N/A;    PROSTATE SURGERY      prostate radiation    RADIOFREQUENCY THERMOCOAGULATION Left 6/4/2021    Procedure: Left L3-5 Lumbar RFA;  Surgeon: Tacho Trivedi MD;  Location: [facility] PAIN MGT;  Service: Pain Management;  Laterality: Left;     RADIOFREQUENCY THERMOCOAGULATION Right 2021    Procedure: Right L3-5 Lumbar RFA;  Surgeon: Tacho Trivedi MD;  Location[facility];  Service: Pain Management;  Laterality: Right;    SPINE SURGERY      fusion    TONSILLECTOMY           Family History:  Family History   Problem Relation Age of Onset    Heart attack Mother     Diabetes Mother     Heart disease Mother     Cataracts Mother     Stroke Father     Heart disease Father     Heart disease Brother        Social History:  Social History     Tobacco Use    Smoking status: Former Smoker     Packs/day: 1.50     Years: 20.00     Pack years: 30.00     Types: Cigarettes     Start date:      Quit date:      Years since quittin.3    Smokeless tobacco: Never Used   Substance and Sexual Activity    Alcohol use: No    Drug use: No    Sexual activity: Not Currently       ROS   Review of Systems   Constitutional: Positive for fatigue. Negative for chills and fever.   HENT: Negative for sore throat.    Respiratory: Negative for shortness of breath.    Cardiovascular: Negative for chest pain.   Gastrointestinal: Negative for diarrhea, nausea and vomiting.   Genitourinary: Negative for dysuria.   Musculoskeletal: Negative for joint swelling.   Skin: Negative for rash.   Neurological: Positive for weakness (generalized). Negative for dizziness, light-headedness, numbness and headaches.   Hematological: Does not bruise/bleed easily.   All other systems reviewed and are negative.    Physical Exam      Initial Vitals [22 1515]   BP Pulse Resp Temp SpO2   (!) 113/55 61 20 97.5 °F (36.4 °C) 99 %      MAP       --          Physical Exam  Nursing Notes and Vital Signs Reviewed.  Constitutional: Patient is in no acute distress. Chronically ill appearing  Head: Atraumatic. Normocephalic.  Eyes: PERRL. EOM intact. Conjunctivae are not pale. No scleral icterus.  ENT: Mucous membranes are moist. Oropharynx is clear and symmetric.    Neck: Supple.  Full ROM.   Cardiovascular: Regular rate. Regular rhythm. No murmurs, rubs, or gallops. Distal pulses are 2+ and symmetric.  Pulmonary/Chest: No respiratory distress. Clear to auscultation bilaterally. No wheezing or rales.  Abdominal: Soft and non-distended.  There is no tenderness.  No rebound, guarding, or rigidity.   Musculoskeletal: Moves all extremities. No obvious deformities. No edema.  Skin: Warm and dry. Pale.  Neurological:  Alert, awake, and appropriate.  Normal speech.  No acute focal neurological deficits are appreciated.  Psychiatric: Normal affect. Good eye contact. Appropriate in content.    ED Course    Procedures  ED Vital Signs:  Vitals:    04/26/22 1515 04/26/22 1540 04/26/22 1545 04/26/22 1600   BP: (!) 113/55  125/63 127/68   Pulse: 61 62 60 62   Resp: 20  (!) 21 20   Temp: 97.5 °F (36.4 °C)      SpO2: 99%  99% 100%   Weight: 99 kg (218 lb 4.1 oz)       04/26/22 1700 04/26/22 1721   BP: 119/62    Pulse: 63    Resp: 20    Temp:  98 °F (36.7 °C)   SpO2: 100%    Weight:         Abnormal Lab Results:  Labs Reviewed   CBC W/ AUTO DIFFERENTIAL - Abnormal; Notable for the following components:       Result Value    WBC 3.67 (*)     RBC 3.01 (*)     Hemoglobin 8.9 (*)     Hematocrit 30.9 (*)      (*)     MCHC 28.8 (*)     RDW 20.0 (*)     Lymph # 0.3 (*)     Gran % 76.9 (*)     Lymph % 8.4 (*)     All other components within normal limits   COMPREHENSIVE METABOLIC PANEL - Abnormal; Notable for the following components:    Chloride 113 (*)     CO2 17 (*)     Glucose 156 (*)     BUN 26 (*)     Creatinine 1.8 (*)     Calcium 8.3 (*)     eGFR if  40 (*)     eGFR if non  35 (*)     All other components within normal limits   B-TYPE NATRIURETIC PEPTIDE - Abnormal; Notable for the following components:     (*)     All other components within normal limits   TROPONIN I   URINALYSIS, REFLEX TO URINE CULTURE    Narrative:     Specimen Source->Urine   TYPE & SCREEN         All Lab Results:  Results for orders placed or performed during the hospital encounter of 04/26/22   CBC auto differential   Result Value Ref Range    WBC 3.67 (L) 3.90 - 12.70 K/uL    RBC 3.01 (L) 4.60 - 6.20 M/uL    Hemoglobin 8.9 (L) 14.0 - 18.0 g/dL    Hematocrit 30.9 (L) 40.0 - 54.0 %     (H) 82 - 98 fL    MCH 29.6 27.0 - 31.0 pg    MCHC 28.8 (L) 32.0 - 36.0 g/dL    RDW 20.0 (H) 11.5 - 14.5 %    Platelets 313 150 - 450 K/uL    MPV 9.7 9.2 - 12.9 fL    Immature Granulocytes 0.3 0.0 - 0.5 %    Gran # (ANC) 2.8 1.8 - 7.7 K/uL    Immature Grans (Abs) 0.01 0.00 - 0.04 K/uL    Lymph # 0.3 (L) 1.0 - 4.8 K/uL    Mono # 0.4 0.3 - 1.0 K/uL    Eos # 0.1 0.0 - 0.5 K/uL    Baso # 0.06 0.00 - 0.20 K/uL    nRBC 0 0 /100 WBC    Gran % 76.9 (H) 38.0 - 73.0 %    Lymph % 8.4 (L) 18.0 - 48.0 %    Mono % 11.2 4.0 - 15.0 %    Eosinophil % 1.6 0.0 - 8.0 %    Basophil % 1.6 0.0 - 1.9 %    Differential Method Automated    Comprehensive metabolic panel   Result Value Ref Range    Sodium 141 136 - 145 mmol/L    Potassium 3.9 3.5 - 5.1 mmol/L    Chloride 113 (H) 95 - 110 mmol/L    CO2 17 (L) 23 - 29 mmol/L    Glucose 156 (H) 70 - 110 mg/dL    BUN 26 (H) 8 - 23 mg/dL    Creatinine 1.8 (H) 0.5 - 1.4 mg/dL    Calcium 8.3 (L) 8.7 - 10.5 mg/dL    Total Protein 6.5 6.0 - 8.4 g/dL    Albumin 3.5 3.5 - 5.2 g/dL    Total Bilirubin 0.4 0.1 - 1.0 mg/dL    Alkaline Phosphatase 83 55 - 135 U/L    AST 23 10 - 40 U/L    ALT 13 10 - 44 U/L    Anion Gap 11 8 - 16 mmol/L    eGFR if African American 40 (A) >60 mL/min/1.73 m^2    eGFR if non African American 35 (A) >60 mL/min/1.73 m^2   Troponin I #1   Result Value Ref Range    Troponin I 0.015 0.000 - 0.026 ng/mL   BNP   Result Value Ref Range     (H) 0 - 99 pg/mL   Urinalysis, Reflex to Urine Culture Urine, Clean Catch    Specimen: Urine   Result Value Ref Range    Specimen UA Urine, Clean Catch     Color, UA Yellow Yellow, Straw, Beth    Appearance, UA Clear Clear    pH, UA 6.0 5.0 -  8.0    Specific Gravity, UA 1.015 1.005 - 1.030    Protein, UA Negative Negative    Glucose, UA Negative Negative    Ketones, UA Negative Negative    Bilirubin (UA) Negative Negative    Occult Blood UA Negative Negative    Nitrite, UA Negative Negative    Urobilinogen, UA Negative <2.0 EU/dL    Leukocytes, UA Negative Negative   Type & Screen   Result Value Ref Range    Group & Rh O POS     Indirect Keshav NEG      *Note: Due to a large number of results and/or encounters for the requested time period, some results have not been displayed. A complete set of results can be found in Results Review.     Imaging Results:  Imaging Results          X-Ray Chest AP Portable (Final result)  Result time 04/26/22 16:15:42    Final result by José Antonio Jason MD (04/26/22 16:15:42)                 Impression:      1.  Increased density in the lung bases concerning for vascular congestion versus reticular interstitial changes.  Interstitial pulmonary edema versus early pneumonia, such as community-acquired pneumonia or atypical viral pneumonia must be considered.  Clinical correlation is advised.    2.  Stable findings as noted above.      Electronically signed by: José Antonio Jason MD  Date:    04/26/2022  Time:    16:15             Narrative:    EXAMINATION:  XR CHEST AP PORTABLE    CLINICAL HISTORY:  weakness;    COMPARISON:  Studies dating back to March 20, 2019    FINDINGS:  EKG leads overlie the chest which is lordotic in position.  There is vascular congestion versus reticular interstitial changes in the lung bases.  The lungs are otherwise clear.  The cardiac silhouette size is enlarged.  The trachea is midline and the mediastinal width is normal. Negative for pneumothorax.  Pulmonary vasculature is mildly congested.  Negative for osseous abnormalities. Tortuous aorta with calcifications of the aortic knob.  There are degenerative changes of the spine and both shoulder girdles.  Three lead left subclavian pacemaker device  again seen.  Median sternotomy wires and CABG changes.                               The EKG was ordered, reviewed, and independently interpreted by the ED provider.  Interpretation time: 15:36  Rate: 61 BPM  Rhythm: Ventricular-paced rhythm  Interpretation: No acute ST changes. No STEMI.           The Emergency Provider reviewed the vital signs and test results, which are outlined above.    ED Discussion     4:00 PM: Dr. Mann transfers care of patient to Dr. Adamson pending lab and imaging results.    5:22 PM: Reassessed pt at this time. Discussed with pt all pertinent ED information and results. Discussed pt dx and plan of tx. Gave pt all f/u and return to the ED instructions. All questions and concerns were addressed at this time. Pt expresses understanding of information and instructions, and is comfortable with plan to discharge. Pt is stable for discharge.    I discussed with patient and/or family/caretaker that evaluation in the ED does not suggest any emergent or life threatening medical conditions requiring immediate intervention beyond what was provided in the ED, and I believe patient is safe for discharge.  Regardless, an unremarkable evaluation in the ED does not preclude the development or presence of a serious of life threatening condition. As such, patient was instructed to return immediately for any worsening or change in current symptoms.         ED Medication(s):  Medications   HYDROcodone-acetaminophen 5-325 mg per tablet 1 tablet (1 tablet Oral Given 4/26/22 1600)        Follow-up Information     Byron Stevens MD In 2 days.    Specialty: Family Medicine  Contact information:  [address] [facility] [id number] [phone]             [facility] - Emergency Dept..    Specialty: Emergency Medicine  Why: As needed, If symptoms worsen  Contact information:  [address] [place] [id number] [phone]           Phillip Hood Md, MD In 1 day.    Specialties:  Cardiology, Internal Medicine  Contact information:  [address]  [place] [address]  [phone]                        Discharge Medication List as of 4/26/2022  5:20 PM            Medical Decision Making    Medical Decision Making:   Clinical Tests:   Lab Tests: Ordered and Reviewed  Radiological Study: Ordered and Reviewed  Medical Tests: Ordered and Reviewed           Scribe Attestation:   Scribe #1: I performed the above scribed service and the documentation accurately describes the services I performed. I attest to the accuracy of the note.    Attending:   Physician Attestation Statement for Scribe #1: I, Diamond Mann MD, personally performed the services described in this documentation, as scribed by Catarino Campa, in my presence, and it is both accurate and complete.       Scribe Attestation:   Scribe #2: I performed the above scribed service and the documentation accurately describes the services I performed. I attest to the accuracy of the note.    Attending Attestation:           Physician Attestation for Scribe:    Physician Attestation Statement for Scribe #2: I, Emma Sheehan MD, reviewed documentation, as scribed by Sindy Clark in my presence, and it is both accurate and complete. I also acknowledge and confirm the content of the note done by Charletteibe #1.          Clinical Impression       ICD-10-CM ICD-9-CM   1. Anemia, unspecified type  D64.9 285.9   2. Weakness  R53.1 780.79       Disposition:   Disposition: Discharged  Condition: Stable         Emma Sheehan MD  04/27/22 0055

## 2022-04-28 ENCOUNTER — HOSPITAL ENCOUNTER (OUTPATIENT)
Dept: CARDIOLOGY | Facility: HOSPITAL | Age: 82
Discharge: HOME | End: 2022-04-28
Attending: INTERNAL MEDICINE
Payer: MEDICARE

## 2022-04-28 ENCOUNTER — OFFICE VISIT (OUTPATIENT)
Dept: CARDIOLOGY | Facility: CLINIC | Age: 82
End: 2022-04-28
Payer: MEDICARE

## 2022-04-28 ENCOUNTER — HOSPITAL ENCOUNTER (OUTPATIENT)
Facility: HOSPITAL | Age: 82
Discharge: HOME | End: 2022-04-30
Attending: EMERGENCY MEDICINE | Admitting: STUDENT IN AN ORGANIZED HEALTH CARE EDUCATION/TRAINING PROGRAM
Payer: MEDICARE

## 2022-04-28 VITALS
HEIGHT: 69 IN | DIASTOLIC BLOOD PRESSURE: 60 MMHG | OXYGEN SATURATION: 98 % | WEIGHT: 219.13 LBS | HEART RATE: 81 BPM | BODY MASS INDEX: 32.46 KG/M2 | SYSTOLIC BLOOD PRESSURE: 110 MMHG

## 2022-04-28 DIAGNOSIS — I10 PRIMARY HYPERTENSION: ICD-10-CM

## 2022-04-28 DIAGNOSIS — N18.4 STAGE 4 CHRONIC KIDNEY DISEASE: ICD-10-CM

## 2022-04-28 DIAGNOSIS — I50.9 CHF (CONGESTIVE HEART FAILURE): ICD-10-CM

## 2022-04-28 DIAGNOSIS — E83.42 HYPOMAGNESEMIA: ICD-10-CM

## 2022-04-28 DIAGNOSIS — R06.02 SOB (SHORTNESS OF BREATH): ICD-10-CM

## 2022-04-28 DIAGNOSIS — N18.4 ANEMIA ASSOCIATED WITH STAGE 4 CHRONIC RENAL FAILURE: ICD-10-CM

## 2022-04-28 DIAGNOSIS — I48.0 PAROXYSMAL ATRIAL FIBRILLATION: ICD-10-CM

## 2022-04-28 DIAGNOSIS — I50.32 CHRONIC DIASTOLIC CONGESTIVE HEART FAILURE: ICD-10-CM

## 2022-04-28 DIAGNOSIS — I48.0 PAF (PAROXYSMAL ATRIAL FIBRILLATION): Primary | ICD-10-CM

## 2022-04-28 DIAGNOSIS — Z95.1 S/P CABG X 3: ICD-10-CM

## 2022-04-28 DIAGNOSIS — I50.42 CHRONIC COMBINED SYSTOLIC AND DIASTOLIC CONGESTIVE HEART FAILURE: ICD-10-CM

## 2022-04-28 DIAGNOSIS — I10 ESSENTIAL HYPERTENSION: ICD-10-CM

## 2022-04-28 DIAGNOSIS — I50.9 ACUTE ON CHRONIC CONGESTIVE HEART FAILURE, UNSPECIFIED HEART FAILURE TYPE: Primary | ICD-10-CM

## 2022-04-28 DIAGNOSIS — Z95.0 CARDIAC PACEMAKER IN SITU: ICD-10-CM

## 2022-04-28 DIAGNOSIS — I25.2 MI, OLD: ICD-10-CM

## 2022-04-28 DIAGNOSIS — I25.118 CORONARY ARTERY DISEASE OF NATIVE ARTERY OF NATIVE HEART WITH STABLE ANGINA PECTORIS: ICD-10-CM

## 2022-04-28 DIAGNOSIS — I70.0 ARTERIOSCLEROSIS OF AORTA: ICD-10-CM

## 2022-04-28 DIAGNOSIS — D63.1 ANEMIA ASSOCIATED WITH STAGE 4 CHRONIC RENAL FAILURE: ICD-10-CM

## 2022-04-28 DIAGNOSIS — D50.0 IRON DEFICIENCY ANEMIA DUE TO CHRONIC BLOOD LOSS: ICD-10-CM

## 2022-04-28 PROBLEM — I50.33 ACUTE ON CHRONIC DIASTOLIC CONGESTIVE HEART FAILURE: Status: ACTIVE | Noted: 2018-03-05

## 2022-04-28 LAB
ALBUMIN SERPL BCP-MCNC: 3.7 G/DL (ref 3.5–5.2)
ALP SERPL-CCNC: 91 U/L (ref 55–135)
ALT SERPL W/O P-5'-P-CCNC: 10 U/L (ref 10–44)
ANION GAP SERPL CALC-SCNC: 10 MMOL/L (ref 8–16)
AST SERPL-CCNC: 19 U/L (ref 10–40)
BASOPHILS # BLD AUTO: 0.04 K/UL (ref 0–0.2)
BASOPHILS NFR BLD: 1 % (ref 0–1.9)
BILIRUB SERPL-MCNC: 0.4 MG/DL (ref 0.1–1)
BILIRUB UR QL STRIP: NEGATIVE
BNP SERPL-MCNC: 615 PG/ML (ref 0–99)
BUN SERPL-MCNC: 19 MG/DL (ref 8–23)
CALCIUM SERPL-MCNC: 8.5 MG/DL (ref 8.7–10.5)
CHLORIDE SERPL-SCNC: 112 MMOL/L (ref 95–110)
CK SERPL-CCNC: 97 U/L (ref 20–200)
CLARITY UR: CLEAR
CO2 SERPL-SCNC: 20 MMOL/L (ref 23–29)
COLOR UR: YELLOW
CREAT SERPL-MCNC: 1.5 MG/DL (ref 0.5–1.4)
CTP QC/QA: YES
DIFFERENTIAL METHOD: ABNORMAL
EOSINOPHIL # BLD AUTO: 0.1 K/UL (ref 0–0.5)
EOSINOPHIL NFR BLD: 1.5 % (ref 0–8)
ERYTHROCYTE [DISTWIDTH] IN BLOOD BY AUTOMATED COUNT: 19.7 % (ref 11.5–14.5)
EST. GFR  (AFRICAN AMERICAN): 50 ML/MIN/1.73 M^2
EST. GFR  (NON AFRICAN AMERICAN): 44 ML/MIN/1.73 M^2
GLUCOSE SERPL-MCNC: 112 MG/DL (ref 70–110)
GLUCOSE UR QL STRIP: NEGATIVE
HCT VFR BLD AUTO: 30.5 % (ref 40–54)
HGB BLD-MCNC: 8.8 G/DL (ref 14–18)
HGB UR QL STRIP: NEGATIVE
IMM GRANULOCYTES # BLD AUTO: 0.02 K/UL (ref 0–0.04)
IMM GRANULOCYTES NFR BLD AUTO: 0.5 % (ref 0–0.5)
KETONES UR QL STRIP: NEGATIVE
LEUKOCYTE ESTERASE UR QL STRIP: NEGATIVE
LYMPHOCYTES # BLD AUTO: 0.4 K/UL (ref 1–4.8)
LYMPHOCYTES NFR BLD: 9.6 % (ref 18–48)
MCH RBC QN AUTO: 29.3 PG (ref 27–31)
MCHC RBC AUTO-ENTMCNC: 28.9 G/DL (ref 32–36)
MCV RBC AUTO: 102 FL (ref 82–98)
MONOCYTES # BLD AUTO: 0.5 K/UL (ref 0.3–1)
MONOCYTES NFR BLD: 11.6 % (ref 4–15)
NEUTROPHILS # BLD AUTO: 3 K/UL (ref 1.8–7.7)
NEUTROPHILS NFR BLD: 75.8 % (ref 38–73)
NITRITE UR QL STRIP: NEGATIVE
NRBC BLD-RTO: 0 /100 WBC
PH UR STRIP: 6 [PH] (ref 5–8)
PLATELET # BLD AUTO: 336 K/UL (ref 150–450)
PMV BLD AUTO: 9 FL (ref 9.2–12.9)
POCT GLUCOSE: 120 MG/DL (ref 70–110)
POTASSIUM SERPL-SCNC: 3.8 MMOL/L (ref 3.5–5.1)
PROT SERPL-MCNC: 6.5 G/DL (ref 6–8.4)
PROT UR QL STRIP: NEGATIVE
RBC # BLD AUTO: 3 M/UL (ref 4.6–6.2)
SARS-COV-2 RDRP RESP QL NAA+PROBE: NEGATIVE
SODIUM SERPL-SCNC: 142 MMOL/L (ref 136–145)
SP GR UR STRIP: 1.02 (ref 1–1.03)
TROPONIN I SERPL DL<=0.01 NG/ML-MCNC: 0.02 NG/ML (ref 0–0.03)
URN SPEC COLLECT METH UR: NORMAL
UROBILINOGEN UR STRIP-ACNC: NEGATIVE EU/DL
WBC # BLD AUTO: 3.95 K/UL (ref 3.9–12.7)

## 2022-04-28 PROCEDURE — G0378 HOSPITAL OBSERVATION PER HR: HCPCS

## 2022-04-28 PROCEDURE — 99999 PR PBB SHADOW E&M-EST. PATIENT-LVL IV: CPT | Mod: PBBFAC,,, | Performed by: INTERNAL MEDICINE

## 2022-04-28 PROCEDURE — 1160F PR REVIEW ALL MEDS BY PRESCRIBER/CLIN PHARMACIST DOCUMENTED: ICD-10-PCS | Mod: CPTII,S$GLB,, | Performed by: INTERNAL MEDICINE

## 2022-04-28 PROCEDURE — 3078F DIAST BP <80 MM HG: CPT | Mod: CPTII,S$GLB,, | Performed by: INTERNAL MEDICINE

## 2022-04-28 PROCEDURE — 93281 CARDIAC DEVICE CHECK - IN CLINIC & HOSPITAL: ICD-10-PCS | Mod: 26,,, | Performed by: INTERNAL MEDICINE

## 2022-04-28 PROCEDURE — 3074F SYST BP LT 130 MM HG: CPT | Mod: CPTII,S$GLB,, | Performed by: INTERNAL MEDICINE

## 2022-04-28 PROCEDURE — 93281 PM DEVICE PROGR EVAL MULTI: CPT | Mod: 26,,, | Performed by: INTERNAL MEDICINE

## 2022-04-28 PROCEDURE — 93005 ELECTROCARDIOGRAM TRACING: CPT | Mod: 59

## 2022-04-28 PROCEDURE — 81003 URINALYSIS AUTO W/O SCOPE: CPT | Performed by: EMERGENCY MEDICINE

## 2022-04-28 PROCEDURE — 1160F RVW MEDS BY RX/DR IN RCRD: CPT | Mod: CPTII,S$GLB,, | Performed by: INTERNAL MEDICINE

## 2022-04-28 PROCEDURE — 99215 OFFICE O/P EST HI 40 MIN: CPT | Mod: 25,S$GLB,, | Performed by: INTERNAL MEDICINE

## 2022-04-28 PROCEDURE — 1101F PR PT FALLS ASSESS DOC 0-1 FALLS W/OUT INJ PAST YR: ICD-10-PCS | Mod: CPTII,S$GLB,, | Performed by: INTERNAL MEDICINE

## 2022-04-28 PROCEDURE — 25000003 PHARM REV CODE 250: Performed by: EMERGENCY MEDICINE

## 2022-04-28 PROCEDURE — 1159F PR MEDICATION LIST DOCUMENTED IN MEDICAL RECORD: ICD-10-PCS | Mod: CPTII,S$GLB,, | Performed by: INTERNAL MEDICINE

## 2022-04-28 PROCEDURE — 3072F LOW RISK FOR RETINOPATHY: CPT | Mod: CPTII,S$GLB,, | Performed by: INTERNAL MEDICINE

## 2022-04-28 PROCEDURE — 3074F PR MOST RECENT SYSTOLIC BLOOD PRESSURE < 130 MM HG: ICD-10-PCS | Mod: CPTII,S$GLB,, | Performed by: INTERNAL MEDICINE

## 2022-04-28 PROCEDURE — 1101F PT FALLS ASSESS-DOCD LE1/YR: CPT | Mod: CPTII,S$GLB,, | Performed by: INTERNAL MEDICINE

## 2022-04-28 PROCEDURE — 63600175 PHARM REV CODE 636 W HCPCS: Performed by: EMERGENCY MEDICINE

## 2022-04-28 PROCEDURE — 99999 PR PBB SHADOW E&M-EST. PATIENT-LVL IV: ICD-10-PCS | Mod: PBBFAC,,, | Performed by: INTERNAL MEDICINE

## 2022-04-28 PROCEDURE — 99285 EMERGENCY DEPT VISIT HI MDM: CPT | Mod: 25

## 2022-04-28 PROCEDURE — 99215 PR OFFICE/OUTPT VISIT, EST, LEVL V, 40-54 MIN: ICD-10-PCS | Mod: 25,S$GLB,, | Performed by: INTERNAL MEDICINE

## 2022-04-28 PROCEDURE — 85025 COMPLETE CBC W/AUTO DIFF WBC: CPT | Performed by: EMERGENCY MEDICINE

## 2022-04-28 PROCEDURE — 3288F PR FALLS RISK ASSESSMENT DOCUMENTED: ICD-10-PCS | Mod: CPTII,S$GLB,, | Performed by: INTERNAL MEDICINE

## 2022-04-28 PROCEDURE — 93281 PM DEVICE PROGR EVAL MULTI: CPT

## 2022-04-28 PROCEDURE — 83880 ASSAY OF NATRIURETIC PEPTIDE: CPT | Performed by: EMERGENCY MEDICINE

## 2022-04-28 PROCEDURE — 3288F FALL RISK ASSESSMENT DOCD: CPT | Mod: CPTII,S$GLB,, | Performed by: INTERNAL MEDICINE

## 2022-04-28 PROCEDURE — 3078F PR MOST RECENT DIASTOLIC BLOOD PRESSURE < 80 MM HG: ICD-10-PCS | Mod: CPTII,S$GLB,, | Performed by: INTERNAL MEDICINE

## 2022-04-28 PROCEDURE — 3072F PR LOW RISK FOR RETINOPATHY: ICD-10-PCS | Mod: CPTII,S$GLB,, | Performed by: INTERNAL MEDICINE

## 2022-04-28 PROCEDURE — U0002 COVID-19 LAB TEST NON-CDC: HCPCS | Performed by: EMERGENCY MEDICINE

## 2022-04-28 PROCEDURE — 25000003 PHARM REV CODE 250: Performed by: NURSE PRACTITIONER

## 2022-04-28 PROCEDURE — 99499 UNLISTED E&M SERVICE: CPT | Mod: S$GLB,,, | Performed by: INTERNAL MEDICINE

## 2022-04-28 PROCEDURE — 1159F MED LIST DOCD IN RCRD: CPT | Mod: CPTII,S$GLB,, | Performed by: INTERNAL MEDICINE

## 2022-04-28 PROCEDURE — 80053 COMPREHEN METABOLIC PANEL: CPT | Performed by: EMERGENCY MEDICINE

## 2022-04-28 PROCEDURE — 1126F PR PAIN SEVERITY QUANTIFIED, NO PAIN PRESENT: ICD-10-PCS | Mod: CPTII,S$GLB,, | Performed by: INTERNAL MEDICINE

## 2022-04-28 PROCEDURE — 96374 THER/PROPH/DIAG INJ IV PUSH: CPT

## 2022-04-28 PROCEDURE — 84484 ASSAY OF TROPONIN QUANT: CPT | Performed by: EMERGENCY MEDICINE

## 2022-04-28 PROCEDURE — 1126F AMNT PAIN NOTED NONE PRSNT: CPT | Mod: CPTII,S$GLB,, | Performed by: INTERNAL MEDICINE

## 2022-04-28 PROCEDURE — 82550 ASSAY OF CK (CPK): CPT | Performed by: EMERGENCY MEDICINE

## 2022-04-28 PROCEDURE — 93010 EKG 12-LEAD: ICD-10-PCS | Mod: ,,, | Performed by: INTERNAL MEDICINE

## 2022-04-28 PROCEDURE — 93010 ELECTROCARDIOGRAM REPORT: CPT | Mod: ,,, | Performed by: INTERNAL MEDICINE

## 2022-04-28 PROCEDURE — 99499 RISK ADDL DX/OHS AUDIT: ICD-10-PCS | Mod: S$GLB,,, | Performed by: INTERNAL MEDICINE

## 2022-04-28 RX ORDER — ONDANSETRON 2 MG/ML
4 INJECTION INTRAMUSCULAR; INTRAVENOUS EVERY 6 HOURS PRN
Status: DISCONTINUED | OUTPATIENT
Start: 2022-04-28 | End: 2022-04-30 | Stop reason: HOSPADM

## 2022-04-28 RX ORDER — INSULIN ASPART 100 [IU]/ML
0-5 INJECTION, SOLUTION INTRAVENOUS; SUBCUTANEOUS
Status: DISCONTINUED | OUTPATIENT
Start: 2022-04-28 | End: 2022-04-30 | Stop reason: HOSPADM

## 2022-04-28 RX ORDER — FUROSEMIDE 10 MG/ML
80 INJECTION INTRAMUSCULAR; INTRAVENOUS
Status: DISCONTINUED | OUTPATIENT
Start: 2022-04-29 | End: 2022-04-30 | Stop reason: HOSPADM

## 2022-04-28 RX ORDER — TALC
9 POWDER (GRAM) TOPICAL NIGHTLY PRN
Status: DISCONTINUED | OUTPATIENT
Start: 2022-04-28 | End: 2022-04-30 | Stop reason: HOSPADM

## 2022-04-28 RX ORDER — HYDRALAZINE HYDROCHLORIDE 20 MG/ML
10 INJECTION INTRAMUSCULAR; INTRAVENOUS EVERY 6 HOURS PRN
Status: DISCONTINUED | OUTPATIENT
Start: 2022-04-28 | End: 2022-04-30 | Stop reason: HOSPADM

## 2022-04-28 RX ORDER — OMEGA-3-ACID ETHYL ESTERS 1 G/1
1 CAPSULE, LIQUID FILLED ORAL 2 TIMES DAILY
Status: DISCONTINUED | OUTPATIENT
Start: 2022-04-28 | End: 2022-04-30 | Stop reason: HOSPADM

## 2022-04-28 RX ORDER — ATORVASTATIN CALCIUM 40 MG/1
80 TABLET, FILM COATED ORAL NIGHTLY
Status: DISCONTINUED | OUTPATIENT
Start: 2022-04-28 | End: 2022-04-30 | Stop reason: HOSPADM

## 2022-04-28 RX ORDER — IBUPROFEN 200 MG
24 TABLET ORAL
Status: DISCONTINUED | OUTPATIENT
Start: 2022-04-28 | End: 2022-04-30 | Stop reason: HOSPADM

## 2022-04-28 RX ORDER — CETIRIZINE HYDROCHLORIDE 5 MG/1
5 TABLET ORAL NIGHTLY
Refills: 1 | Status: DISCONTINUED | OUTPATIENT
Start: 2022-04-28 | End: 2022-04-30 | Stop reason: HOSPADM

## 2022-04-28 RX ORDER — LANOLIN ALCOHOL/MO/W.PET/CERES
400 CREAM (GRAM) TOPICAL DAILY
Status: DISCONTINUED | OUTPATIENT
Start: 2022-04-29 | End: 2022-04-30

## 2022-04-28 RX ORDER — FUROSEMIDE 40 MG/1
40 TABLET ORAL 2 TIMES DAILY
Qty: 90 TABLET | Refills: 1 | Status: SHIPPED | OUTPATIENT
Start: 2022-04-28 | End: 2022-01-01 | Stop reason: SDUPTHER

## 2022-04-28 RX ORDER — FLUTICASONE PROPIONATE 50 MCG
2 SPRAY, SUSPENSION (ML) NASAL DAILY
Status: DISCONTINUED | OUTPATIENT
Start: 2022-04-29 | End: 2022-04-30 | Stop reason: HOSPADM

## 2022-04-28 RX ORDER — VIT C/E/ZN/COPPR/LUTEIN/ZEAXAN 250MG-90MG
1000 CAPSULE ORAL DAILY
Status: DISCONTINUED | OUTPATIENT
Start: 2022-04-29 | End: 2022-04-29 | Stop reason: CLARIF

## 2022-04-28 RX ORDER — NAPROXEN SODIUM 220 MG/1
81 TABLET, FILM COATED ORAL DAILY
Status: DISCONTINUED | OUTPATIENT
Start: 2022-04-29 | End: 2022-04-30 | Stop reason: HOSPADM

## 2022-04-28 RX ORDER — SPIRONOLACTONE 25 MG/1
25 TABLET ORAL DAILY
Status: DISCONTINUED | OUTPATIENT
Start: 2022-04-29 | End: 2022-04-30 | Stop reason: HOSPADM

## 2022-04-28 RX ORDER — PANTOPRAZOLE SODIUM 40 MG/1
40 TABLET, DELAYED RELEASE ORAL DAILY
Status: DISCONTINUED | OUTPATIENT
Start: 2022-04-29 | End: 2022-04-30 | Stop reason: HOSPADM

## 2022-04-28 RX ORDER — ACETAMINOPHEN 325 MG/1
650 TABLET ORAL EVERY 6 HOURS PRN
Status: DISCONTINUED | OUTPATIENT
Start: 2022-04-28 | End: 2022-04-30 | Stop reason: HOSPADM

## 2022-04-28 RX ORDER — IBUPROFEN 200 MG
16 TABLET ORAL
Status: DISCONTINUED | OUTPATIENT
Start: 2022-04-28 | End: 2022-04-30 | Stop reason: HOSPADM

## 2022-04-28 RX ORDER — FUROSEMIDE 10 MG/ML
60 INJECTION INTRAMUSCULAR; INTRAVENOUS
Status: COMPLETED | OUTPATIENT
Start: 2022-04-28 | End: 2022-04-28

## 2022-04-28 RX ORDER — CLONAZEPAM 1 MG/1
1 TABLET ORAL NIGHTLY PRN
Status: DISCONTINUED | OUTPATIENT
Start: 2022-04-28 | End: 2022-04-30 | Stop reason: HOSPADM

## 2022-04-28 RX ORDER — ACETAMINOPHEN 500 MG
1000 TABLET ORAL
Status: COMPLETED | OUTPATIENT
Start: 2022-04-28 | End: 2022-04-28

## 2022-04-28 RX ORDER — POTASSIUM CHLORIDE 20 MEQ/1
20 TABLET, EXTENDED RELEASE ORAL DAILY
Status: DISCONTINUED | OUTPATIENT
Start: 2022-04-29 | End: 2022-04-30 | Stop reason: HOSPADM

## 2022-04-28 RX ORDER — GLUCAGON 1 MG
1 KIT INJECTION
Status: DISCONTINUED | OUTPATIENT
Start: 2022-04-28 | End: 2022-04-30 | Stop reason: HOSPADM

## 2022-04-28 RX ORDER — LOSARTAN POTASSIUM 50 MG/1
50 TABLET ORAL DAILY
Status: DISCONTINUED | OUTPATIENT
Start: 2022-04-29 | End: 2022-04-30 | Stop reason: HOSPADM

## 2022-04-28 RX ADMIN — ACETAMINOPHEN 1000 MG: 500 TABLET ORAL at 05:04

## 2022-04-28 RX ADMIN — OMEGA-3-ACID ETHYL ESTERS 1 G: 1 CAPSULE, LIQUID FILLED ORAL at 09:04

## 2022-04-28 RX ADMIN — Medication 9 MG: at 09:04

## 2022-04-28 RX ADMIN — CETIRIZINE HYDROCHLORIDE 5 MG: 5 TABLET ORAL at 09:04

## 2022-04-28 RX ADMIN — ATORVASTATIN CALCIUM 80 MG: 40 TABLET, FILM COATED ORAL at 09:04

## 2022-04-28 RX ADMIN — FUROSEMIDE 60 MG: 10 INJECTION, SOLUTION INTRAMUSCULAR; INTRAVENOUS at 05:04

## 2022-04-28 RX ADMIN — APIXABAN 2.5 MG: 2.5 TABLET, FILM COATED ORAL at 09:04

## 2022-04-28 RX ADMIN — CLONAZEPAM 1 MG: 1 TABLET ORAL at 09:04

## 2022-04-28 NOTE — PROGRESS NOTES
Subjective:   Patient ID:  Jake Ortiz is a 81 y.o. male who presents for cardiac consult of No chief complaint on file.      Follow-up  Pertinent negatives include no chest pain.     The patient came in today for cardiac consult of No chief complaint on file.    Jake Ortiz is a 81 y.o. male pt with CAD s/p CABG, old MI, HFPEF with TR/MR, AVB s/p CRT, PAF on Eliquis, Anemia, HTN, HLD, DM2, NASREEN, CKD4, GERD, Restrictive/obstructive lung disease here for follow up CV care.    4/21/20  Pt seen at [facility] last by CARMELINA Spencer 9/18/19. Overall feels great for the most part. He has been started to gain some water weight in the past, he had been on Entreso by Dr. Menendez, lost 173 lbs. He had paracentesis in past was told due to CHF. His weight started coming back up to 208 lbs and then was gaining weight and he has doubled his meds - Entresto. Otherwise feels well has good energy. He changed here due to insurance.   BP - 108/67, pulse 62, oxygen 98%, weight 209 lbs, blood sugar 127  Reviewed prior chart from [facility]    5/22/20  BNP was neg after last visit, Cr slightly increased with BUN elevated told to take lasix daily and PRN extra. He saw Dr. Holley yesterday, was given Flonase breathing improved.   /120s systolics, 67 diastolic, pulse 60s. . Overall doing will. Needs device clinic.     7/16/20  ECHO with EF 50%, mild to mod MR/TR. Pt had trouble getting Entresto filled due to being in donut hole but patient assistance form filled out.   He has significant back pain. He is euvolemic, no CP/SOB.   ECG - bi V paced    9/24/20 - hosp f/u  He was admitted for lower GI bleed and acute blood loss anemia. Initial H/H of 5.1/16.9.  Patient was transfused 2 units of PRBCs. GI consulted. Will hold anticoagulations. Today H/H 7.4/23.3. Case  discussed with Dr. Farmer, will need  EGD and colonoscopy to evaluate further and given recent anticoagulation to decrease the risk of bleeding  from the procedures will plan for the procedures in 5 days. As of 9/20 pt had a bowel movement with red blood yesterday morning, no further episodes reported. This morning H/H 6.7/21.5, will transfuse 2 units of PRBCs. Plans for EGD/colonoscopy on Tuesday. As of 9/21 no active bleeding noted. H/H 9.0/28.0. Continue to monitor. Plan for procedure tomorrow. 09/22: Patient had EGD that showed gastritis which was biopsied. Discussed with GI who recommends dc home today, restart anti coagulation tomorrow, and f/u OP for video capsule study. GI will arrange OP f/u and call patient. New Rx for Pantoprazole 40mg daily sent to pharmacy.   He has restarted taking Eliqis    11/3/20  He has been to ER few times with fatigue and symptomatic anemia, transfused 10/23/20 and then again last Sunday 11/2/20. He cannot get Video capsule study due to pacemaker. He will see Dr. Nath for further workup tx. He has severe back pain, had injection which improved sx will get RFA. Will change Entresto due to Losartan, has trouble affording some meds, ELiquis is also expensive but prefer over coumadin.     2/11/21  He has gotten both COVID vaccines, he is pleased with the process. He has been doing well overall. No CP/SOB. BP and HR well controlled.   He had renal eval and told may need more Losartan.     5/6/21  BP and HR is well controlled.   ECG - V paced, Biv paced    From Device check  saw this pt in clinic in February. He appears to be symptomatic to Alverix alert for fluid overload. He confirms taking Lasix BID. He reports having 3L of fluid taken off of him a year ago in the hospital. He has AF and is currently undersensing some of the episodes. We will attempt to address this in clinic. He is a CRT-P and his BiV pacing has decreased to 91.8%, which in order to be effective is preferred at 95%. He does not see an EP.    Today he has fatigue and back pain. He has had crawfish a few times last few months.     7/15/21  Last device  interrog with AF with freq mode switch and inc Optivol.     Per Dr. Walton-    Last week he was found to have a critical low hemoglobin of 5.8.  Sent to the ER and had 2 units packed red blood cells transfused.  Has had a problem with chronic anemia with multiple transfusions in the past.  Last EGD and colonoscopy were negative.  No small bowel studies were done at that time.  Also followed by hematology for iron infusions.    He will see heme/onc next week.     10/28/21  From pt - I have some shortness of breath, but I have been under the weather. I've seen Dr. Walton last Friday. Feeling bloated, took a potassium tablet last night. Very little coughing today, starting to dry up. See you tomorrow.  His feet have been getting too tight in shoes.     11/11/21  He went to a recent party where he was very active and feeling well now. He had recent URI sx imporve. Weight 205-207 lbs. BP and HR stable.   ECG - V paced, Bi V paced    Hospital Course:   Patient was kept on OBS for symptomatic anemia under the care of hospital medicine.  12/23: Hgb increased to 7.4 overnight with transfusion of 2u pRBC. Pt is still very tired. Troponin increased to 0.118, pt mukesh CP, palpitations, but admits to SOB with exertion. cardiology consulted. GI feels like no indication for intervention, will continue OP f/u for VCE that is scheduled for next week, they have signed off. Heme/onc following - will start IV iron and continue OP f/u as scheduled.  12/24/21 Hemoglobin stable. Will have VCE completed outpatient per GI. Okay to resume Eliquis 2.5mg daily BID. He was asked to hold Plavix and start ASA for CAD until after GI test.     12/30/21  BP 140s/70s. HR 80s. Pt had recent admission for anemia with elevated trop, s/p PRBC, is on Eliquis 2.5 BID, DAPT on hold. He is taking only half tab Eliquis BID.     Component Ref Range & Units 3 wk ago   (12/30/21) 1 mo ago   (12/17/21) 2 mo ago   (10/28/21) 8 mo ago   (5/11/21) 1 yr ago    (4/27/20) 3 yr ago   (7/31/18) 3 yr ago   (3/5/18)   BNP 0 - 99 pg/mL 471 High   222 High  CM  282 High  CM            1/20/22  BP and HR stable today. BNP was elevated told pt - labs reveal elevated BNP due to extra fluid, need to double up lasix to 40mg twice a day until breathing is improving and have less swelling/weight, continue monitoring bP and weights daily and low salt diet, follow up with me in 2-3 weeks will adjust meds further. Kidney function, potassium and magnesium are stable.   He still has more back pain, sees Dr. Gamez. His breathing is better and energy is improved. He is taking iron tabs. His video capsule was neg. No further bleeding issues.   219 lbs --> 206 lbs today     2/10/22  Pt had eval with Dr. Price will be a candidate for Watchman. Labs from last visit - labs reveal low potassium and magnesium, start taking potassium 20 meq and magnesium 400mg daily. BNP has improved.   BP and HR stable now. He wants to get Watchman in [place] possible. Breathing has improved, weight is coming down.     4/28/22  BP and HR stable. He has been gaining more weight lately, about 6 lbs, he has more KING as well. His device interrogation     Patient feels no chest pain,  no PND, no palpitation, no dizziness, no syncope, no CNS symptoms.    Patient has dec exercise tolerance.    Patient is compliant with medications.    6/8/21 Device  Since 7/23/2020:  AMS x 6101 - available EGMs c/w AF, burden only 8.5%, possible undersensing noted.  P wave 0.4mV, increased atrial sensitivity 0.3 --> 0.15mV.    OptiVol WNL, possible fluid build up Feb 7 - May 22nd.  Pt has congestive heart failure and cardiomegaly.  Well-functioning device.     Results for orders placed during the hospital encounter of 06/01/20   Echo Color Flow Doppler? Yes    Narrative · Concentric left ventricular hypertrophy.  · Left ventricular systolic function. The estimated ejection fraction is   50%.  · Indeterminate left ventricular diastolic  function.  · Severe left atrial enlargement.  · Mild-to-moderate mitral regurgitation.  · Mild to moderate tricuspid regurgitation.            Past Medical History:   Diagnosis Date    Abnormal PFT     Anemia     Arthritis     Atrial fibrillation 10/19/2017    Back pain     Congestive heart failure 3/5/2018    Coronary artery disease     DM (diabetes mellitus) 2018     am 06/23/2020    DM (diabetes mellitus) 2016     am 08/17/2021    Hyperlipemia     Hypertension     Myocardial infarction     Obesity     NASREEN (obstructive sleep apnea) 6/5/2018    Prostate cancer 2015    Tobacco dependence     Type 2 diabetes mellitus        Past Surgical History:   Procedure Laterality Date    COLONOSCOPY N/A 9/22/2020    Procedure: COLONOSCOPY;  Surgeon: Javier Farmer MD;  Location: [facility] ENDO;  Service: Endoscopy;  Laterality: N/A;    CORONARY ARTERY BYPASS GRAFT  1987    EPIDURAL STEROID INJECTION N/A 11/22/2019    Procedure: Lumbar L5/S1 IL XUAN;  Surgeon: Tacho Trivedi MD;  Location: [facility] PAIN MGT;  Service: Pain Management;  Laterality: N/A;    EPIDURAL STEROID INJECTION N/A 1/6/2020    Procedure: Lumbar L5/S1 IL XUAN;  Surgeon: Tacho Trivedi MD;  [facility];  Service: Pain Management;  Laterality: N/A;    EPIDURAL STEROID INJECTION N/A 2/10/2020    Procedure: Caudal XUAN;  Surgeon: Tacho Trivedi MD;  [facility];  Service: Pain Management;  Laterality: N/A;    ESOPHAGOGASTRODUODENOSCOPY N/A 9/22/2020    Procedure: EGD (ESOPHAGOGASTRODUODENOSCOPY);  Surgeon: Javier Farmer MD;  Location: [facility] ENDO;  Service: Endoscopy;  Laterality: N/A;    INJECTION OF ANESTHETIC AGENT AROUND MEDIAL BRANCH NERVES INNERVATING LUMBAR FACET JOINT Bilateral 10/12/2020    Procedure: Bilateral L3-5 MBB;  Surgeon: Tacho Trivedi MD;  Location: [facility] PAIN MGT;  Service: Pain Management;  Laterality: Bilateral;    INJECTION OF ANESTHETIC AGENT AROUND MEDIAL  BRANCH NERVES INNERVATING LUMBAR FACET JOINT Bilateral 2020    Procedure: Bilateral L3-5 MBB with steroid;  Surgeon: Tacho Trivedi MD;  Location: [facility] PAIN MGT;  Service: Pain Management;  Laterality: Bilateral;    INTRALUMINAL GASTROINTESTINAL TRACT IMAGING VIA CAPSULE N/A 2021    Procedure: IMAGING PROCEDURE, GI TRACT, INTRALUMINAL, VIA CAPSULE;  Surgeon: Tahir Geronimo RN;  Location: [facility] ENDO;  Service: Endoscopy;  Laterality: N/A;    PROSTATE SURGERY      prostate radiation    RADIOFREQUENCY THERMOCOAGULATION Left 2021    Procedure: Left L3-5 Lumbar RFA;  Surgeon: Tacho Trivedi MD;  [facility]: [facility];  Service: Pain Management;  Laterality: Left;    RADIOFREQUENCY THERMOCOAGULATION Right 2021    Procedure: Right L3-5 Lumbar RFA;  Surgeon: Tacho Trivedi MD;  Location: [facility] PAIN MGT;  Service: Pain Management;  Laterality: Right;    SPINE SURGERY      fusion    TONSILLECTOMY         Social History     Tobacco Use    Smoking status: Former Smoker     Packs/day: 1.50     Years: 20.00     Pack years: 30.00     Types: Cigarettes     Start date:      Quit date:      Years since quittin.3    Smokeless tobacco: Never Used   Substance Use Topics    Alcohol use: No    Drug use: No       Family History   Problem Relation Age of Onset    Heart attack Mother     Diabetes Mother     Heart disease Mother     Cataracts Mother     Stroke Father     Heart disease Father     Heart disease Brother        Patient's Medications   New Prescriptions    No medications on file   Previous Medications    ACETAMINOPHEN (TYLENOL) 650 MG TBSR    Take 1,300 mg by mouth every 6 (six) hours as needed for Pain.    APIXABAN (ELIQUIS) 2.5 MG TAB    Take 1 tablet (2.5 mg total) by mouth 2 (two) times daily.    ASPIRIN 81 MG CHEW    Take 1 tablet (81 mg total) by mouth once daily.    ATORVASTATIN (LIPITOR) 80 MG TABLET    One tablet daily    BLOOD SUGAR DIAGNOSTIC STRP    Check blood  glucose levels daily in the AM fasting and 1-2 times more daily.    CHOLECALCIFEROL, VITAMIN D3, (VITAMIN D3) 25 MCG (1,000 UNIT) CAPSULE    Take 1,000 Units by mouth once daily.    CLONAZEPAM (KLONOPIN) 1 MG TABLET    Take 1 tablet (1 mg total) by mouth nightly as needed for Anxiety.    FLUTICASONE PROPIONATE (FLONASE) 50 MCG/ACTUATION NASAL SPRAY    2 sprays (100 mcg total) by Each Nostril route once daily.    FUROSEMIDE (LASIX) 20 MG TABLET    Take 1 tablet (20 mg total) by mouth 2 (two) times daily. Can double to 2 tablets twice a day for 3 days for more swelling/fluid build up    KRILL/OM-3/DHA/EPA/PHOSPHO/AST (MEGARED OMEGA-3 KRILL OIL ORAL)    Take 1 capsule by mouth every morning.    LANCETS MISC    Check blood glucose levels daily in the AM fasting and 1-2 times more daily.    LEVOCETIRIZINE (XYZAL) 5 MG TABLET    TAKE 1 TABLET EVERY EVENING    LOSARTAN (COZAAR) 50 MG TABLET    Take 1 tablet (50 mg total) by mouth once daily.    MAGNESIUM OXIDE (MAG-OX) 400 MG (241.3 MG MAGNESIUM) TABLET    Take 1 tablet (400 mg total) by mouth once daily.    MULTIVITAMIN CAPSULE    Take 1 capsule by mouth once daily.    PANTOPRAZOLE (PROTONIX) 40 MG TABLET    Take 1 tablet (40 mg total) by mouth once daily.    POTASSIUM CHLORIDE SA (K-DUR,KLOR-CON) 20 MEQ TABLET    Take 1 tablet (20 mEq total) by mouth once daily.    SPIRONOLACTONE (ALDACTONE) 25 MG TABLET    Take 1 tablet (25 mg total) by mouth once daily.    TRAMADOL (ULTRAM) 50 MG TABLET    Take 12.5 mg by mouth every 6 (six) hours as needed for Pain.    VIT A/VIT C/VIT E/ZINC/COPPER (OCUVITE PRESERVISION ORAL)    Take 1 capsule by mouth every evening.   Modified Medications    No medications on file   Discontinued Medications    No medications on file       Review of Systems   Constitutional: Positive for malaise/fatigue.   HENT: Negative.    Eyes: Negative.    Respiratory: Positive for shortness of breath.    Cardiovascular: Positive for leg swelling. Negative for  chest pain.   Gastrointestinal: Negative.    Genitourinary: Negative.    Musculoskeletal: Negative.    Skin: Negative.    Neurological: Negative.    Endo/Heme/Allergies: Negative.    Psychiatric/Behavioral: Negative.    All 12 systems otherwise negative.      Wt Readings from Last 3 Encounters:   04/26/22 99 kg (218 lb 4.1 oz)   04/22/22 99 kg (218 lb 4.1 oz)   04/22/22 99 kg (218 lb 4.1 oz)     Temp Readings from Last 3 Encounters:   04/26/22 98 °F (36.7 °C)   04/22/22 97 °F (36.1 °C)   04/22/22 98.1 °F (36.7 °C) (Temporal)     BP Readings from Last 3 Encounters:   04/26/22 119/62   04/22/22 121/67   04/22/22 115/65     Pulse Readings from Last 3 Encounters:   04/26/22 63   04/22/22 88   04/22/22 84       There were no vitals taken for this visit.    Objective:   Physical Exam  Vitals and nursing note reviewed.   Constitutional:       General: He is not in acute distress.     Appearance: He is well-developed. He is not diaphoretic.   HENT:      Head: Normocephalic and atraumatic.      Nose: Nose normal.   Eyes:      General: No scleral icterus.        Right eye: No discharge.         Left eye: No discharge.      Conjunctiva/sclera: Conjunctivae normal.   Neck:      Thyroid: No thyromegaly.      Vascular: No JVD.   Cardiovascular:      Rate and Rhythm: Normal rate and regular rhythm.      Heart sounds: S1 normal and S2 normal. Murmur heard.     No friction rub. No gallop. No S3 or S4 sounds.   Pulmonary:      Effort: Pulmonary effort is normal. No respiratory distress.      Breath sounds: Normal breath sounds. No stridor. No wheezing or rales.   Chest:      Chest wall: No tenderness.   Abdominal:      General: Bowel sounds are normal. There is no distension.      Palpations: Abdomen is soft. There is no mass.      Tenderness: There is no abdominal tenderness. There is no rebound.   Genitourinary:     Comments: Deferred  Musculoskeletal:         General: No tenderness or deformity. Normal range of motion.       Cervical back: Normal range of motion and neck supple.   Lymphadenopathy:      Cervical: No cervical adenopathy.   Skin:     General: Skin is warm and dry.      Coloration: Skin is not pale.      Findings: No erythema or rash.   Neurological:      Mental Status: He is alert and oriented to person, place, and time.      Motor: No abnormal muscle tone.      Coordination: Coordination normal.   Psychiatric:         Behavior: Behavior normal.         Thought Content: Thought content normal.         Judgment: Judgment normal.         Lab Results   Component Value Date     04/26/2022    K 3.9 04/26/2022     (H) 04/26/2022    CO2 17 (L) 04/26/2022    BUN 26 (H) 04/26/2022    CREATININE 1.8 (H) 04/26/2022     (H) 04/26/2022    HGBA1C 5.1 03/17/2022    MG 2.0 02/10/2022    AST 23 04/26/2022    ALT 13 04/26/2022    ALBUMIN 3.5 04/26/2022    PROT 6.5 04/26/2022    BILITOT 0.4 04/26/2022    WBC 3.67 (L) 04/26/2022    HGB 8.9 (L) 04/26/2022    HCT 30.9 (L) 04/26/2022     (H) 04/26/2022     04/26/2022    INR 1.2 11/01/2020    TSH 1.268 11/04/2020    CHOL 126 07/07/2021    HDL 38 (L) 07/07/2021    LDLCALC 52.4 (L) 07/07/2021    TRIG 178 (H) 07/07/2021     (H) 04/26/2022     Assessment:      1. PAF (paroxysmal atrial fibrillation)    2. S/P CABG x 3    3. Hypomagnesemia    4. Coronary artery disease of native artery of native heart with stable angina pectoris    5. MI, old    6. Primary hypertension    7. Stage 4 chronic kidney disease    8. Chronic diastolic congestive heart failure    9. Arteriosclerosis of aorta    10. Iron deficiency anemia due to chronic blood loss    11. Anemia associated with stage 4 chronic renal failure        Plan:   1. CAD s/p CABG x3, old MI  - cont meds - DAPT on hold due to anemia   - elevated trop sec to demand ischemia     2. HFPEF with TR/MR, improved EF ;  --> 282 -> 222 --> 471 --> 230  --> 507  Weight; 219 lbs --> 206  --> 200 lbs today  - cont meds  - cont lasix to 40mg BID -- decreased 20mg BID by nephro, s/p inreased to lasix 40 BID PRN  --> increased to lasix 40 BID few days; with K/Mg   - needs to take 80mg BID   - cont to monitor valve disease  - change Entresto to Losartan  - increased Losartan  - cont Aldactone 25mg  - send to ER now , needs to avoid crawfish    3. PAF had episode of PNA  - cont meds - Eliquis   - f/u Dr. Price for Watchman device    [id number] score: 5  HAS-BLED score: 5    If you answer NO to any of the four criteria below, the patient does not meet the WATCHMAN implant eligibility requirements.     1. Patient has non-valvular atrial fibrillation: Yes  2. Patient has an increased risk for stroke and is recommended for oral anticoagulation (OAC): Yes  3. Patient is suitable for short-term anticoagulation therapy but deemed unable to take long-term OAC: Yes  Patient has an appropriate rationale to seek a non-pharmacological alternative to OACs. Specific factors include: History of bleeding or increased bleeding risk,    CHADSVASC - 5  HASLBED score - 5    4. NASREEN  - cont CPAP    5. Restricive/obstrucive lung disease  - cont tx per PCP/pulm    6. DM2 6.0  --> 6.1 --> 6.3 --> 5.1  - cont tx per PCP    7. AVB s/p ICD - CRT-P and his BiV pacing has decreased to 91.8%,  - cont to monitor  - f/u device clinic  - Last device interrog with AF with freq mode switch and inc Optivol    8. Anemia sec to GIB, CKD  - f/u with GI and heme/onc; s/p video capsule - was neg  - s/p PRBC, is on Eliquis 2.5 BID, DAPT on hold.   - cont iron tabs and IV iron infusion     9. CKD  - cont to monitor   - f/u nephro     Will send to ER now due to severe KING with CHF exac    Thank you for allowing me to participate in this patient's care. Please do not hesitate to contact me with any questions or concerns. Consult note has been forwarded to the referral physician.

## 2022-04-28 NOTE — ED PROVIDER NOTES
SCRIBE #1 NOTE: I, Rhonda Bhatti, am scribing for, and in the presence of, Jovanni Lacey MD. I have scribed the HPI, ROS, and PEx.    SCRIBE #2 NOTE: I, Jonathan Cline, am scribing for, and in the presence of,  Tacho Gilbert MD. I have scribed the remaining portions of the note not scribed by Scribe #1.     History      Chief Complaint   Patient presents with    Shortness of Breath     Told to come to ER by Dr. Hood for fluid overload.        Review of patient's allergies indicates:  No Known Allergies     HPI   HPI    4/28/2022, 3:21 PM   History obtained from the patient      History of Present Illness: Jake Ortiz is a 81 y.o. male patient with a PMHx of atrial fibrillation, congestive heart failure, CAD, DM2, HLD, HTN, MI, NASREEN, and prostate cancer who presents to the Emergency Department after being referred to the ED by Dr. Hood (Cardiology) for an evaluation of fluid overload. Associated sxs include SOB, unexpected weight gain, and bilateral leg swelling. Symptoms are constant and moderate in severity. He reports that his SOB is worse upon exertion. No mitigating factors reported. Patient denies any fever, chills, CP, palpitations, dizziness, weakness, and all other sxs at this time. No further complaints or concerns at this time.         Arrival mode: Personal vehicle    PCP: Byron Stevens MD       Past Medical History:  Past Medical History:   Diagnosis Date    Abnormal PFT     Anemia     Arthritis     Atrial fibrillation 10/19/2017    Back pain     Congestive heart failure 3/5/2018    Coronary artery disease     DM (diabetes mellitus) 2018     am 06/23/2020    DM (diabetes mellitus) 2016     am 08/17/2021    Hyperlipemia     Hypertension     Myocardial infarction     Obesity     NASREEN (obstructive sleep apnea) 6/5/2018    Prostate cancer 2015    Tobacco dependence     Type 2 diabetes mellitus        Past Surgical History:  Past Surgical History:   Procedure Laterality  Date    COLONOSCOPY N/A 9/22/2020    Procedure: COLONOSCOPY;  Surgeon: Javier Farmer MD;  [facility] ENDO;  Service: Endoscopy;  Laterality: N/A;    CORONARY ARTERY BYPASS GRAFT  1987    EPIDURAL STEROID INJECTION N/A 11/22/2019    Procedure: Lumbar L5/S1 IL XUAN;  Surgeon: Tacho Trivedi MD;  [facility];  Service: Pain Management;  Laterality: N/A;    EPIDURAL STEROID INJECTION N/A 1/6/2020    Procedure: Lumbar L5/S1 IL XUAN;  Surgeon: Tacho Trivedi MD;  [facility]: [facility];  Service: Pain Management;  Laterality: N/A;    EPIDURAL STEROID INJECTION N/A 2/10/2020    Procedure: Caudal XUAN;  Surgeon: Tacho Trivedi MD;  [facility]: [facility];  Service: Pain Management;  Laterality: N/A;    ESOPHAGOGASTRODUODENOSCOPY N/A 9/22/2020    Procedure: EGD (ESOPHAGOGASTRODUODENOSCOPY);  Surgeon: Javier Farmer MD;  Location: [facility];  Service: Endoscopy;  Laterality: N/A;    INJECTION OF ANESTHETIC AGENT AROUND MEDIAL BRANCH NERVES INNERVATING LUMBAR FACET JOINT Bilateral 10/12/2020    Procedure: Bilateral L3-5 MBB;  Surgeon: Tacho Trivedi MD;  Location: [facility] PAIN MGT;  Service: Pain Management;  Laterality: Bilateral;    INJECTION OF ANESTHETIC AGENT AROUND MEDIAL BRANCH NERVES INNERVATING LUMBAR FACET JOINT Bilateral 12/21/2020    Procedure: Bilateral L3-5 MBB with steroid;  Surgeon: aTcho Trivedi MD;  Location: [facility] PAIN MGT;  Service: Pain Management;  Laterality: Bilateral;    INTRALUMINAL GASTROINTESTINAL TRACT IMAGING VIA CAPSULE N/A 12/29/2021    Procedure: IMAGING PROCEDURE, GI TRACT, INTRALUMINAL, VIA CAPSULE;  Surgeon: Tahir Geronimo RN;  Location: [facility] ENDO;  Service: Endoscopy;  Laterality: N/A;    PROSTATE SURGERY      prostate radiation    RADIOFREQUENCY THERMOCOAGULATION Left 6/4/2021    Procedure: Left L3-5 Lumbar RFA;  Surgeon: Tacho Trivedi MD;  Location: [facility] PAIN MGT;  Service: Pain Management;  Laterality: Left;     RADIOFREQUENCY THERMOCOAGULATION Right 2021    Procedure: Right L3-5 Lumbar RFA;  Surgeon: Tacho Trivedi MD;  [facility] PAIN T;  Service: Pain Management;  Laterality: Right;    SPINE SURGERY      fusion    TONSILLECTOMY           Family History:  Family History   Problem Relation Age of Onset    Heart attack Mother     Diabetes Mother     Heart disease Mother     Cataracts Mother     Stroke Father     Heart disease Father     Heart disease Brother        Social History:  Social History     Tobacco Use    Smoking status: Former Smoker     Packs/day: 1.50     Years: 20.00     Pack years: 30.00     Types: Cigarettes     Start date:      Quit date:      Years since quittin.3    Smokeless tobacco: Never Used   Substance and Sexual Activity    Alcohol use: No    Drug use: No    Sexual activity: Not Currently       ROS   Review of Systems   Constitutional: Positive for unexpected weight change (gain). Negative for chills and fever.   HENT: Negative for sore throat.    Respiratory: Positive for shortness of breath.    Cardiovascular: Positive for leg swelling (bilateral). Negative for chest pain and palpitations.   Gastrointestinal: Negative for nausea.   Genitourinary: Negative for dysuria.   Musculoskeletal: Negative for back pain.   Skin: Negative for rash.   Neurological: Negative for dizziness and weakness.   Hematological: Does not bruise/bleed easily.   All other systems reviewed and are negative.    Physical Exam      Initial Vitals [22 1424]   BP Pulse Resp Temp SpO2   122/60 61 20 97.6 °F (36.4 °C) 100 %      MAP       --          Physical Exam  Nursing Notes and Vital Signs Reviewed.  Constitutional: Patient is in no acute distress. Well-developed and well-nourished.  Head: Atraumatic. Normocephalic.  Eyes: PERRL. EOM intact. Conjunctivae are not pale. No scleral icterus.  ENT: Mucous membranes are moist. Oropharynx is clear and symmetric.    Neck: Supple. Full  "ROM. No lymphadenopathy.  Cardiovascular: Regular rate. Regular rhythm. No murmurs, rubs, or gallops. Distal pulses are 2+ and symmetric.  Pulmonary/Chest: No respiratory distress. Clear to auscultation bilaterally. No wheezing. Bilateral lower lung field crackles.  Abdominal: Soft and non-distended.  There is no tenderness.  No rebound, guarding, or rigidity.   Musculoskeletal: Moves all extremities. No obvious deformities. 3+ bilateral lower extremity pitting edema.  Skin: Warm and dry.  Neurological:  Alert, awake, and appropriate.  Normal speech.  No acute focal neurological deficits are appreciated.  Psychiatric: Normal affect. Good eye contact. Appropriate in content.    ED Course    Procedures  ED Vital Signs:  Vitals:    04/28/22 1424 04/28/22 1530 04/28/22 1900 04/28/22 1915   BP: 122/60 (!) 118/53  115/62   Pulse: 61 66 60 60   Resp: 20  (!) 21 19   Temp: 97.6 °F (36.4 °C)      TempSrc: Oral      SpO2: 100% 99% 100% 100%   Weight:       Height:        04/28/22 1940 04/28/22 2011 04/28/22 2035 04/28/22 2100   BP: (!) 149/94      Pulse: 68   60   Resp: 18      Temp: 97.5 °F (36.4 °C)      TempSrc: Oral      SpO2: 100%      Weight:  97.7 kg (215 lb 6.2 oz)     Height:  5' 9" (1.753 m) 5' 9.5" (1.765 m)     04/28/22 2300 04/29/22 0059 04/29/22 0100   BP:  (!) 106/50    Pulse: 73 67 61   Resp:  19    Temp:  98.1 °F (36.7 °C)    TempSrc:  Oral    SpO2:  95%    Weight:  98.9 kg (218 lb 0.6 oz)    Height:          Abnormal Lab Results:  Labs Reviewed   CBC W/ AUTO DIFFERENTIAL - Abnormal; Notable for the following components:       Result Value    RBC 3.00 (*)     Hemoglobin 8.8 (*)     Hematocrit 30.5 (*)      (*)     MCHC 28.9 (*)     RDW 19.7 (*)     MPV 9.0 (*)     Lymph # 0.4 (*)     Gran % 75.8 (*)     Lymph % 9.6 (*)     All other components within normal limits   COMPREHENSIVE METABOLIC PANEL - Abnormal; Notable for the following components:    Chloride 112 (*)     CO2 20 (*)     Glucose 112 (*)     " Creatinine 1.5 (*)     Calcium 8.5 (*)     eGFR if  50 (*)     eGFR if non  44 (*)     All other components within normal limits   B-TYPE NATRIURETIC PEPTIDE - Abnormal; Notable for the following components:     (*)     All other components within normal limits   URINALYSIS, REFLEX TO URINE CULTURE    Narrative:     Specimen Source->Urine   CK   TROPONIN I   SARS-COV-2 RDRP GENE    Narrative:     This test utilizes isothermal nucleic acid amplification   technology to detect the SARS-CoV-2 RdRp nucleic acid segment.   The analytical sensitivity (limit of detection) is 125 genome   equivalents/mL.   A POSITIVE result implies infection with the SARS-CoV-2 virus;   the patient is presumed to be contagious.     A NEGATIVE result means that SARS-CoV-2 nucleic acids are not   present above the limit of detection. A NEGATIVE result should be   treated as presumptive. It does not rule out the possibility of   COVID-19 and should not be the sole basis for treatment decisions.   If COVID-19 is strongly suspected based on clinical and exposure   history, re-testing using an alternate molecular assay should be   considered.   This test is only for use under the Food and Drug   Administration s Emergency Use Authorization (EUA).   Commercial kits are provided by Consumer Agent Portal (CAP).   Performance characteristics of the EUA have been independently   verified by [facility] Department of   Pathology and Laboratory Medicine.   _________________________________________________________________   The authorized Fact Sheet for Healthcare Providers and the authorized Fact   Sheet for Patients of the ID NOW COVID-19 are available on the FDA   website:     https://www.fda.gov/media/917485/download  https://www.fda.gov/media/732907/download               All Lab Results:  Results for orders placed or performed during the hospital encounter of 04/28/22   CBC Auto Differential   Result Value  Ref Range    WBC 3.95 3.90 - 12.70 K/uL    RBC 3.00 (L) 4.60 - 6.20 M/uL    Hemoglobin 8.8 (L) 14.0 - 18.0 g/dL    Hematocrit 30.5 (L) 40.0 - 54.0 %     (H) 82 - 98 fL    MCH 29.3 27.0 - 31.0 pg    MCHC 28.9 (L) 32.0 - 36.0 g/dL    RDW 19.7 (H) 11.5 - 14.5 %    Platelets 336 150 - 450 K/uL    MPV 9.0 (L) 9.2 - 12.9 fL    Immature Granulocytes 0.5 0.0 - 0.5 %    Gran # (ANC) 3.0 1.8 - 7.7 K/uL    Immature Grans (Abs) 0.02 0.00 - 0.04 K/uL    Lymph # 0.4 (L) 1.0 - 4.8 K/uL    Mono # 0.5 0.3 - 1.0 K/uL    Eos # 0.1 0.0 - 0.5 K/uL    Baso # 0.04 0.00 - 0.20 K/uL    nRBC 0 0 /100 WBC    Gran % 75.8 (H) 38.0 - 73.0 %    Lymph % 9.6 (L) 18.0 - 48.0 %    Mono % 11.6 4.0 - 15.0 %    Eosinophil % 1.5 0.0 - 8.0 %    Basophil % 1.0 0.0 - 1.9 %    Differential Method Automated    Comprehensive Metabolic Panel   Result Value Ref Range    Sodium 142 136 - 145 mmol/L    Potassium 3.8 3.5 - 5.1 mmol/L    Chloride 112 (H) 95 - 110 mmol/L    CO2 20 (L) 23 - 29 mmol/L    Glucose 112 (H) 70 - 110 mg/dL    BUN 19 8 - 23 mg/dL    Creatinine 1.5 (H) 0.5 - 1.4 mg/dL    Calcium 8.5 (L) 8.7 - 10.5 mg/dL    Total Protein 6.5 6.0 - 8.4 g/dL    Albumin 3.7 3.5 - 5.2 g/dL    Total Bilirubin 0.4 0.1 - 1.0 mg/dL    Alkaline Phosphatase 91 55 - 135 U/L    AST 19 10 - 40 U/L    ALT 10 10 - 44 U/L    Anion Gap 10 8 - 16 mmol/L    eGFR if African American 50 (A) >60 mL/min/1.73 m^2    eGFR if non African American 44 (A) >60 mL/min/1.73 m^2   Urinalysis, Reflex to Urine Culture Urine, Clean Catch    Specimen: Urine   Result Value Ref Range    Specimen UA Urine, Clean Catch     Color, UA Yellow Yellow, Straw, Beth    Appearance, UA Clear Clear    pH, UA 6.0 5.0 - 8.0    Specific Gravity, UA 1.020 1.005 - 1.030    Protein, UA Negative Negative    Glucose, UA Negative Negative    Ketones, UA Negative Negative    Bilirubin (UA) Negative Negative    Occult Blood UA Negative Negative    Nitrite, UA Negative Negative    Urobilinogen, UA Negative <2.0  EU/dL    Leukocytes, UA Negative Negative   BNP   Result Value Ref Range     (H) 0 - 99 pg/mL   CK   Result Value Ref Range    CPK 97 20 - 200 U/L   Troponin I   Result Value Ref Range    Troponin I 0.019 0.000 - 0.026 ng/mL   POCT COVID-19 Rapid Screening   Result Value Ref Range    POC Rapid COVID Negative Negative     Acceptable Yes    POCT glucose   Result Value Ref Range    POCT Glucose 120 (H) 70 - 110 mg/dL     *Note: Due to a large number of results and/or encounters for the requested time period, some results have not been displayed. A complete set of results can be found in Results Review.         Imaging Results:  Imaging Results          X-Ray Chest AP Portable (Final result)  Result time 04/28/22 15:37:45    Final result by Jer Le MD (04/28/22 15:37:45)                 Impression:      Interval improvement when compared to radiograph from 04/26/2022, as above.      Electronically signed by: Jer Le  Date:    04/28/2022  Time:    15:37             Narrative:    EXAMINATION:  XR CHEST AP PORTABLE    CLINICAL HISTORY:  sob;    TECHNIQUE:  Single frontal view of the chest was performed.    COMPARISON:  Chest radiograph 04/26/2022.    FINDINGS:  Unchanged appearance of the left-sided pacing device and leads.  Low lung volumes and interval improvement in the bibasilar opacities, mild residual.  No effusion or pneumothorax.  Unchanged cardiomediastinal silhouette.  No free air under the diaphragm.  No acute osseous abnormality.                               The EKG was ordered, reviewed, and independently interpreted by the ED provider.  Interpretation time: 14:25  Rate: 62 BPM  Rhythm: Ventricular-paced rhythm  Interpretation: Biventricular pacemaker detected. No STEMI.       The Emergency Provider reviewed the vital signs and test results, which are outlined above.    ED Discussion     4:00 PM: Dr. Lacey transfers care of patient to Dr. Gilbert pending lab  results.    6:04 PM: Discussed pt's case with Dr. Bonilla (Cardiology)who recommends admission under the CHF pathway due to acute respiratory failure.    6:17 PM: Discussed case with Tasha Griffiths NP (Hospital Medicine). Dr. Barnes agrees with current care and management of pt and accepts admission.   Admitting Service: Hospital Medicine  Admitting Physician: Dr. Barnes  Admit to: Obs Tele             ED Medication(s):  Medications   apixaban tablet 2.5 mg (2.5 mg Oral Given 4/28/22 2130)   aspirin chewable tablet 81 mg (has no administration in time range)   atorvastatin tablet 80 mg (80 mg Oral Given 4/28/22 2130)   clonazePAM tablet 1 mg (1 mg Oral Given 4/28/22 2130)   fluticasone propionate 50 mcg/actuation nasal spray 100 mcg (has no administration in time range)   cetirizine tablet 5 mg (5 mg Oral Given 4/28/22 2130)   losartan tablet 50 mg (has no administration in time range)   magnesium oxide tablet 400 mg (has no administration in time range)   multivitamin tablet (has no administration in time range)   pantoprazole EC tablet 40 mg (has no administration in time range)   potassium chloride SA CR tablet 20 mEq (has no administration in time range)   spironolactone tablet 25 mg (has no administration in time range)   omega-3 acid ethyl esters capsule 1 g (1 g Oral Given 4/28/22 2130)   glucose chewable tablet 16 g (has no administration in time range)   glucose chewable tablet 24 g (has no administration in time range)   glucagon (human recombinant) injection 1 mg (has no administration in time range)   dextrose 10% bolus 125 mL (has no administration in time range)   dextrose 10% bolus 250 mL (has no administration in time range)   insulin aspart U-100 pen 0-5 Units (has no administration in time range)   hydrALAZINE injection 10 mg (has no administration in time range)   furosemide injection 80 mg (has no administration in time range)   ondansetron injection 4 mg (has no administration in time range)    acetaminophen tablet 650 mg (has no administration in time range)   melatonin tablet 9 mg (9 mg Oral Given 4/28/22 2130)   vitamin D 1000 units tablet 1,000 Units (has no administration in time range)   furosemide injection 60 mg (60 mg Intravenous Given 4/28/22 1714)   acetaminophen tablet 1,000 mg (1,000 mg Oral Given 4/28/22 1730)           Current Discharge Medication List            Medical Decision Making    Medical Decision Making:   Clinical Tests:   Lab Tests: Ordered and Reviewed  Radiological Study: Ordered and Reviewed  Medical Tests: Ordered and Reviewed           Scribe Attestation:   Scribe #1: I performed the above scribed service and the documentation accurately describes the services I performed. I attest to the accuracy of the note.    Attending:   Physician Attestation Statement for Scribe #1: I, Jovanni Lacey MD, personally performed the services described in this documentation, as scribed by Rhonda Bhatti, in my presence, and it is both accurate and complete.       Scribe Attestation:   Scribe #2: I performed the above scribed service and the documentation accurately describes the services I performed. I attest to the accuracy of the note.    Attending Attestation:           Physician Attestation for Scribe:    Physician Attestation Statement for Scribe #2: I, Tacho Gilbert MD, reviewed documentation, as scribed by Jonathan Cline in my presence, and it is both accurate and complete. I also acknowledge and confirm the content of the note done by Scribe #1.          Clinical Impression       ICD-10-CM ICD-9-CM   1. Acute on chronic congestive heart failure, unspecified heart failure type  I50.9 428.0   2. SOB (shortness of breath)  R06.02 786.05   3. CHF (congestive heart failure)  I50.9 428.0   4. CHF (congestive heart failure)  I50.9 428.0       Disposition:   Disposition: Placed in Observation  Condition: Deneen Gilbert MD  04/29/22 3216

## 2022-04-28 NOTE — PHARMACY MED REC
"Admission Medication History     The home medication history was taken by Ramón Walker.    You may go to "Admission" then "Reconcile Home Medications" tabs to review and/or act upon these items.      The home medication list has been updated by the Pharmacy department.    Please read ALL comments highlighted in yellow.    Please address this information as you see fit.     Feel free to contact us if you have any questions or require assistance.      Medications listed below were obtained from: Patient/family  (Not in a hospital admission)      Ramón Walker  [id number]    Current Outpatient Medications on File Prior to Encounter   Medication Sig Dispense Refill Last Dose    apixaban (ELIQUIS) 2.5 mg Tab Take 1 tablet (2.5 mg total) by mouth 2 (two) times daily. 180 tablet 1 4/28/2022 at Unknown time    aspirin 81 MG Chew Take 1 tablet (81 mg total) by mouth once daily. 30 tablet 0 4/28/2022 at Unknown time    atorvastatin (LIPITOR) 80 MG tablet One tablet daily (Patient taking differently: Take 80 mg by mouth every evening.) 90 tablet 1 4/27/2022 at Unknown time    cholecalciferol, vitamin D3, (VITAMIN D3) 25 mcg (1,000 unit) capsule Take 1,000 Units by mouth once daily.   4/28/2022 at Unknown time    clonazePAM (KLONOPIN) 1 MG tablet Take 1 tablet (1 mg total) by mouth nightly as needed for Anxiety. 90 tablet 0 4/27/2022 at Unknown time    fluticasone propionate (FLONASE) 50 mcg/actuation nasal spray 2 sprays (100 mcg total) by Each Nostril route once daily. 48 g 5 Past Week at Unknown time    furosemide (LASIX) 40 MG tablet Take 1 tablet (40 mg total) by mouth 2 (two) times daily. Can double to 2 tablets twice a day for 3 days for more swelling/fluid build up - take 80mg twice a day 90 tablet 1 4/28/2022 at Unknown time    krill/om-3/dha/epa/phospho/ast (MEGARED OMEGA-3 KRILL OIL ORAL) Take 1 capsule by mouth every morning.   4/28/2022 at Unknown time    levocetirizine (XYZAL) 5 MG tablet " TAKE 1 TABLET EVERY EVENING 90 tablet 1 4/27/2022 at Unknown time    losartan (COZAAR) 50 MG tablet Take 1 tablet (50 mg total) by mouth once daily. 90 tablet 3 4/28/2022 at Unknown time    magnesium oxide (MAG-OX) 400 mg (241.3 mg magnesium) tablet Take 1 tablet (400 mg total) by mouth once daily. 90 tablet 1 4/28/2022 at Unknown time    multivitamin capsule Take 1 capsule by mouth once daily.   4/28/2022 at Unknown time    pantoprazole (PROTONIX) 40 MG tablet Take 1 tablet (40 mg total) by mouth once daily. 90 tablet 3 4/28/2022 at Unknown time    potassium chloride SA (K-DUR,KLOR-CON) 20 MEQ tablet Take 1 tablet (20 mEq total) by mouth once daily. 90 tablet 1 4/28/2022 at Unknown time    spironolactone (ALDACTONE) 25 MG tablet Take 1 tablet (25 mg total) by mouth once daily. 90 tablet 1 4/28/2022 at Unknown time    traMADoL (ULTRAM) 50 mg tablet Take 12.5 mg by mouth every 6 (six) hours as needed for Pain.   4/28/2022 at Unknown time    vit A/vit C/vit E/zinc/copper (OCUVITE PRESERVISION ORAL) Take 1 capsule by mouth every evening.   4/27/2022 at Unknown time    acetaminophen (TYLENOL) 650 MG TbSR Take 1,300 mg by mouth every 6 (six) hours as needed for Pain.       blood sugar diagnostic Strp Check blood glucose levels daily in the AM fasting and 1-2 times more daily. 300 strip 3     lancets Misc Check blood glucose levels daily in the AM fasting and 1-2 times more daily. 300 each 3                              .

## 2022-04-29 ENCOUNTER — DOCUMENTATION ONLY (OUTPATIENT)
Dept: TRANSPLANT | Facility: CLINIC | Age: 82
End: 2022-04-29
Payer: MEDICARE

## 2022-04-29 PROBLEM — D63.1 ANEMIA IN STAGE 4 CHRONIC KIDNEY DISEASE: Status: ACTIVE | Noted: 2022-04-29

## 2022-04-29 PROBLEM — N18.4 ANEMIA IN STAGE 4 CHRONIC KIDNEY DISEASE: Status: ACTIVE | Noted: 2022-04-29

## 2022-04-29 LAB
ALBUMIN SERPL BCP-MCNC: 3.3 G/DL (ref 3.5–5.2)
ALP SERPL-CCNC: 81 U/L (ref 55–135)
ALT SERPL W/O P-5'-P-CCNC: 12 U/L (ref 10–44)
ANION GAP SERPL CALC-SCNC: 11 MMOL/L (ref 8–16)
AORTIC ROOT ANNULUS: 2.73 CM
ASCENDING AORTA: 2.53 CM
AST SERPL-CCNC: 17 U/L (ref 10–40)
AV INDEX (PROSTH): 0.56
AV MEAN GRADIENT: 7 MMHG
AV PEAK GRADIENT: 13 MMHG
AV VALVE AREA: 1.53 CM2
AV VELOCITY RATIO: 0.61
BASOPHILS # BLD AUTO: 0.06 K/UL (ref 0–0.2)
BASOPHILS NFR BLD: 1.7 % (ref 0–1.9)
BILIRUB SERPL-MCNC: 0.5 MG/DL (ref 0.1–1)
BSA FOR ECHO PROCEDURE: 2.19 M2
BUN SERPL-MCNC: 17 MG/DL (ref 8–23)
CALCIUM SERPL-MCNC: 8.4 MG/DL (ref 8.7–10.5)
CHLORIDE SERPL-SCNC: 114 MMOL/L (ref 95–110)
CO2 SERPL-SCNC: 18 MMOL/L (ref 23–29)
CREAT SERPL-MCNC: 1.4 MG/DL (ref 0.5–1.4)
CV ECHO LV RWT: 0.68 CM
DIFFERENTIAL METHOD: ABNORMAL
DOP CALC AO PEAK VEL: 1.78 M/S
DOP CALC AO VTI: 36.8 CM
DOP CALC LVOT AREA: 2.7 CM2
DOP CALC LVOT DIAMETER: 1.87 CM
DOP CALC LVOT PEAK VEL: 1.08 M/S
DOP CALC LVOT STROKE VOLUME: 56.27 CM3
DOP CALC RVOT PEAK VEL: 0.47 M/S
DOP CALC RVOT VTI: 12.8 CM
DOP CALCLVOT PEAK VEL VTI: 20.5 CM
E WAVE DECELERATION TIME: 209.34 MSEC
E/A RATIO: 3.17
E/E' RATIO: 10.57 M/S
ECHO EF ESTIMATED: 58 %
ECHO LV POSTERIOR WALL: 1.72 CM (ref 0.6–1.1)
EJECTION FRACTION: 35 %
EOSINOPHIL # BLD AUTO: 0.1 K/UL (ref 0–0.5)
EOSINOPHIL NFR BLD: 3.1 % (ref 0–8)
ERYTHROCYTE [DISTWIDTH] IN BLOOD BY AUTOMATED COUNT: 19.5 % (ref 11.5–14.5)
EST. GFR  (AFRICAN AMERICAN): 55 ML/MIN/1.73 M^2
EST. GFR  (NON AFRICAN AMERICAN): 47 ML/MIN/1.73 M^2
FRACTIONAL SHORTENING: 31 % (ref 28–44)
GLUCOSE SERPL-MCNC: 97 MG/DL (ref 70–110)
HCT VFR BLD AUTO: 29.4 % (ref 40–54)
HGB BLD-MCNC: 8.4 G/DL (ref 14–18)
IMM GRANULOCYTES # BLD AUTO: 0.01 K/UL (ref 0–0.04)
IMM GRANULOCYTES NFR BLD AUTO: 0.3 % (ref 0–0.5)
INTERVENTRICULAR SEPTUM: 1.1 CM (ref 0.6–1.1)
IVC DIAMETER: 2.76 CM
IVRT: 34.25 MSEC
LA MAJOR: 7.07 CM
LA MINOR: 7.25 CM
LEFT ATRIUM SIZE: 5.19 CM
LEFT INTERNAL DIMENSION IN SYSTOLE: 3.53 CM (ref 2.1–4)
LEFT VENTRICLE DIASTOLIC VOLUME INDEX: 57.52 ML/M2
LEFT VENTRICLE DIASTOLIC VOLUME: 123.09 ML
LEFT VENTRICLE MASS INDEX: 141 G/M2
LEFT VENTRICLE SYSTOLIC VOLUME INDEX: 24.2 ML/M2
LEFT VENTRICLE SYSTOLIC VOLUME: 51.8 ML
LEFT VENTRICULAR INTERNAL DIMENSION IN DIASTOLE: 5.09 CM (ref 3.5–6)
LEFT VENTRICULAR MASS: 302.65 G
LV LATERAL E/E' RATIO: 9.25 M/S
LV SEPTAL E/E' RATIO: 12.33 M/S
LVOT MG: 2.51 MMHG
LVOT MV: 0.75 CM/S
LYMPHOCYTES # BLD AUTO: 0.6 K/UL (ref 1–4.8)
LYMPHOCYTES NFR BLD: 16.1 % (ref 18–48)
MAGNESIUM SERPL-MCNC: 2 MG/DL (ref 1.6–2.6)
MCH RBC QN AUTO: 29.8 PG (ref 27–31)
MCHC RBC AUTO-ENTMCNC: 28.6 G/DL (ref 32–36)
MCV RBC AUTO: 104 FL (ref 82–98)
MONOCYTES # BLD AUTO: 0.6 K/UL (ref 0.3–1)
MONOCYTES NFR BLD: 16.4 % (ref 4–15)
MV PEAK A VEL: 0.35 M/S
MV PEAK E VEL: 1.11 M/S
NEUTROPHILS # BLD AUTO: 2.2 K/UL (ref 1.8–7.7)
NEUTROPHILS NFR BLD: 62.4 % (ref 38–73)
NRBC BLD-RTO: 0 /100 WBC
PISA MRMAX VEL: 4.34 M/S
PISA TR MAX VEL: 2.34 M/S
PLATELET # BLD AUTO: 313 K/UL (ref 150–450)
PMV BLD AUTO: 9.2 FL (ref 9.2–12.9)
POCT GLUCOSE: 128 MG/DL (ref 70–110)
POCT GLUCOSE: 141 MG/DL (ref 70–110)
POCT GLUCOSE: 90 MG/DL (ref 70–110)
POTASSIUM SERPL-SCNC: 3.6 MMOL/L (ref 3.5–5.1)
PROT SERPL-MCNC: 5.8 G/DL (ref 6–8.4)
PV MEAN GRADIENT: 0.59 MMHG
RA MAJOR: 6.21 CM
RA PRESSURE: 15 MMHG
RA WIDTH: 5.24 CM
RBC # BLD AUTO: 2.82 M/UL (ref 4.6–6.2)
RIGHT VENTRICULAR END-DIASTOLIC DIMENSION: 3.47 CM
SINUS: 2.84 CM
SODIUM SERPL-SCNC: 143 MMOL/L (ref 136–145)
STJ: 2.87 CM
TDI LATERAL: 0.12 M/S
TDI SEPTAL: 0.09 M/S
TDI: 0.11 M/S
TR MAX PG: 22 MMHG
TRICUSPID ANNULAR PLANE SYSTOLIC EXCURSION: 0.76 CM
TV REST PULMONARY ARTERY PRESSURE: 37 MMHG
WBC # BLD AUTO: 3.53 K/UL (ref 3.9–12.7)

## 2022-04-29 PROCEDURE — 25000003 PHARM REV CODE 250: Performed by: STUDENT IN AN ORGANIZED HEALTH CARE EDUCATION/TRAINING PROGRAM

## 2022-04-29 PROCEDURE — 63600175 PHARM REV CODE 636 W HCPCS: Mod: JG | Performed by: STUDENT IN AN ORGANIZED HEALTH CARE EDUCATION/TRAINING PROGRAM

## 2022-04-29 PROCEDURE — 80053 COMPREHEN METABOLIC PANEL: CPT | Performed by: NURSE PRACTITIONER

## 2022-04-29 PROCEDURE — 83735 ASSAY OF MAGNESIUM: CPT | Performed by: NURSE PRACTITIONER

## 2022-04-29 PROCEDURE — 99215 OFFICE O/P EST HI 40 MIN: CPT | Mod: 25,,, | Performed by: INTERNAL MEDICINE

## 2022-04-29 PROCEDURE — 25000003 PHARM REV CODE 250: Performed by: NURSE PRACTITIONER

## 2022-04-29 PROCEDURE — G0378 HOSPITAL OBSERVATION PER HR: HCPCS

## 2022-04-29 PROCEDURE — 96376 TX/PRO/DX INJ SAME DRUG ADON: CPT

## 2022-04-29 PROCEDURE — 83036 HEMOGLOBIN GLYCOSYLATED A1C: CPT | Performed by: NURSE PRACTITIONER

## 2022-04-29 PROCEDURE — 94761 N-INVAS EAR/PLS OXIMETRY MLT: CPT

## 2022-04-29 PROCEDURE — 94640 AIRWAY INHALATION TREATMENT: CPT

## 2022-04-29 PROCEDURE — 99215 PR OFFICE/OUTPT VISIT, EST, LEVL V, 40-54 MIN: ICD-10-PCS | Mod: 25,,, | Performed by: INTERNAL MEDICINE

## 2022-04-29 PROCEDURE — 36415 COLL VENOUS BLD VENIPUNCTURE: CPT | Performed by: NURSE PRACTITIONER

## 2022-04-29 PROCEDURE — 25000242 PHARM REV CODE 250 ALT 637 W/ HCPCS: Performed by: INTERNAL MEDICINE

## 2022-04-29 PROCEDURE — 63600175 PHARM REV CODE 636 W HCPCS: Performed by: NURSE PRACTITIONER

## 2022-04-29 PROCEDURE — 25000242 PHARM REV CODE 250 ALT 637 W/ HCPCS: Performed by: NURSE PRACTITIONER

## 2022-04-29 PROCEDURE — 96372 THER/PROPH/DIAG INJ SC/IM: CPT | Performed by: STUDENT IN AN ORGANIZED HEALTH CARE EDUCATION/TRAINING PROGRAM

## 2022-04-29 PROCEDURE — 85025 COMPLETE CBC W/AUTO DIFF WBC: CPT | Performed by: NURSE PRACTITIONER

## 2022-04-29 RX ORDER — POTASSIUM CHLORIDE 20 MEQ/1
40 TABLET, EXTENDED RELEASE ORAL ONCE
Status: COMPLETED | OUTPATIENT
Start: 2022-04-29 | End: 2022-04-29

## 2022-04-29 RX ORDER — IPRATROPIUM BROMIDE AND ALBUTEROL SULFATE 2.5; .5 MG/3ML; MG/3ML
3 SOLUTION RESPIRATORY (INHALATION) EVERY 4 HOURS PRN
Status: DISCONTINUED | OUTPATIENT
Start: 2022-04-29 | End: 2022-04-30 | Stop reason: HOSPADM

## 2022-04-29 RX ORDER — CHOLECALCIFEROL (VITAMIN D3) 25 MCG
1000 TABLET ORAL DAILY
Status: DISCONTINUED | OUTPATIENT
Start: 2022-04-29 | End: 2022-04-30 | Stop reason: HOSPADM

## 2022-04-29 RX ORDER — SODIUM CHLORIDE 0.9 % (FLUSH) 0.9 %
10 SYRINGE (ML) INJECTION
Status: DISCONTINUED | OUTPATIENT
Start: 2022-04-29 | End: 2022-04-30 | Stop reason: HOSPADM

## 2022-04-29 RX ORDER — BISMUTH SUBSALICYLATE 525 MG/30ML
30 LIQUID ORAL EVERY 6 HOURS PRN
Status: DISCONTINUED | OUTPATIENT
Start: 2022-04-29 | End: 2022-04-30 | Stop reason: HOSPADM

## 2022-04-29 RX ADMIN — OMEGA-3-ACID ETHYL ESTERS 1 G: 1 CAPSULE, LIQUID FILLED ORAL at 08:04

## 2022-04-29 RX ADMIN — FLUTICASONE PROPIONATE 100 MCG: 50 SPRAY, METERED NASAL at 08:04

## 2022-04-29 RX ADMIN — ACETAMINOPHEN 650 MG: 325 TABLET ORAL at 03:04

## 2022-04-29 RX ADMIN — APIXABAN 2.5 MG: 2.5 TABLET, FILM COATED ORAL at 09:04

## 2022-04-29 RX ADMIN — LOSARTAN POTASSIUM 50 MG: 50 TABLET, FILM COATED ORAL at 08:04

## 2022-04-29 RX ADMIN — OMEGA-3-ACID ETHYL ESTERS 1 G: 1 CAPSULE, LIQUID FILLED ORAL at 09:04

## 2022-04-29 RX ADMIN — SPIRONOLACTONE 25 MG: 25 TABLET ORAL at 08:04

## 2022-04-29 RX ADMIN — FUROSEMIDE 80 MG: 10 INJECTION INTRAMUSCULAR; INTRAVENOUS at 05:04

## 2022-04-29 RX ADMIN — ASPIRIN 81 MG CHEWABLE TABLET 81 MG: 81 TABLET CHEWABLE at 08:04

## 2022-04-29 RX ADMIN — ACETAMINOPHEN 650 MG: 325 TABLET ORAL at 02:04

## 2022-04-29 RX ADMIN — Medication 400 MG: at 08:04

## 2022-04-29 RX ADMIN — BISMUTH SUBSALICYLATE 30 ML: 525 LIQUID ORAL at 05:04

## 2022-04-29 RX ADMIN — POTASSIUM CHLORIDE 40 MEQ: 1500 TABLET, EXTENDED RELEASE ORAL at 11:04

## 2022-04-29 RX ADMIN — PANTOPRAZOLE SODIUM 40 MG: 40 TABLET, DELAYED RELEASE ORAL at 08:04

## 2022-04-29 RX ADMIN — ATORVASTATIN CALCIUM 80 MG: 40 TABLET, FILM COATED ORAL at 09:04

## 2022-04-29 RX ADMIN — IPRATROPIUM BROMIDE AND ALBUTEROL SULFATE 3 ML: 2.5; .5 SOLUTION RESPIRATORY (INHALATION) at 06:04

## 2022-04-29 RX ADMIN — POTASSIUM CHLORIDE 20 MEQ: 1500 TABLET, EXTENDED RELEASE ORAL at 08:04

## 2022-04-29 RX ADMIN — Medication 1000 UNITS: at 08:04

## 2022-04-29 RX ADMIN — POTASSIUM CHLORIDE 40 MEQ: 1500 TABLET, EXTENDED RELEASE ORAL at 05:04

## 2022-04-29 RX ADMIN — EPOETIN ALFA-EPBX 10000 UNITS: 10000 INJECTION, SOLUTION INTRAVENOUS; SUBCUTANEOUS at 06:04

## 2022-04-29 RX ADMIN — CETIRIZINE HYDROCHLORIDE 5 MG: 5 TABLET ORAL at 09:04

## 2022-04-29 RX ADMIN — APIXABAN 2.5 MG: 2.5 TABLET, FILM COATED ORAL at 08:04

## 2022-04-29 RX ADMIN — THERA TABS 1 TABLET: TAB at 08:04

## 2022-04-29 NOTE — PLAN OF CARE
[facility] - Telemetry (Hospital)  Discharge Final Note    Primary Care Provider: Byron Stevens MD    Expected Discharge Date: 4/29/2022    Final Discharge Note (most recent)     Final Note - 04/29/22 0935        Final Note    Assessment Type Final Discharge Note     Anticipated Discharge Disposition Home or Self Care     Hospital Resources/Appts/Education Provided Appointments scheduled by Navigator/Coordinator;Appointments scheduled and added to AVS                 Important Message from Medicare             DC dispo: home with spouse  PCP f/u: 5/2 with clinic NP

## 2022-04-29 NOTE — CONSULTS
[facility] - Tele[facility] ([facility])  Cardiology  Consult Note    Patient Name: Jake Ortiz  MRN: [id number]  Admission Date: 4/28/2022  Hospital Length of Stay: 0 days  Code Status: Full Code   Attending Provider: Lulú Barnes MD   Consulting Provider: Anuradha Richardson PA-C  Primary Care Physician: Byron Stevens MD  Principal Problem:Acute on chronic diastolic congestive heart failure    Patient information was obtained from patient, past medical records and ER records.     Inpatient consult to Cardiology  Consult performed by: Anuradha Richardson PA-C  Consult ordered by: Tacho Gilbert MD        Subjective:     Chief Complaint:  CHF    HPI:   Mr. Ortiz is a 81 year old male patient whose current medical conditions include CAD s/p CABG, old MI, HFpEF with TR/MR, AVB s/p CRT, PAF on Eliquis, anemia, HTN, hyperlipidemia, DM type II, NASREEN, CKD stage IV, restrictive/obstructive lung disease who was sent to [facility] ED from cardiology clinic due to CHF symptoms. Patient complained of increased SOB associated with BLE edema and weight gain over the past few days. He denied any associated allison chest pain, fever, chills, palpitations, near syncope, or syncope. Initial workup in ED revealed BNP of 615 and anemia (H/H 8.4/29.4) and patient was subsequently admitted for further evaluation and treatment. Cardiology consulted to assist with management. Patient seen and examined today, resting in bed. Feeling better, states SOB and BLE edema have improved. No allison chest pain. I's/O's not charted correctly as patient accidentally spilled his urinal. He reports compliance with his medications. Followed routinely on OP basis by Dr. Hood. Admits to recent dietary indiscretion-ate crawfish. Echo pending.         Past Medical History:   Diagnosis Date    Abnormal PFT     Anemia     Arthritis     Atrial fibrillation 10/19/2017    Back pain     Congestive heart failure 3/5/2018    Coronary artery disease     DM (diabetes  mellitus) 2018     am 06/23/2020    DM (diabetes mellitus) 2016     am 08/17/2021    Hyperlipemia     Hypertension     Myocardial infarction     Obesity     NASREEN (obstructive sleep apnea) 6/5/2018    Prostate cancer 2015    Tobacco dependence     Type 2 diabetes mellitus        Past Surgical History:   Procedure Laterality Date    COLONOSCOPY N/A 9/22/2020    Procedure: COLONOSCOPY;  Surgeon: Javier Farmer MD;  Location: [facility] ENDO;  Service: Endoscopy;  Laterality: N/A;    CORONARY ARTERY BYPASS GRAFT  1987    EPIDURAL STEROID INJECTION N/A 11/22/2019    Procedure: Lumbar L5/S1 IL XUAN;  Surgeon: Tacho Trivedi MD;  Location: [facility] PAIN [facility]GT;  Service: Pain Management;  Laterality: N/A;    EPIDURAL STEROID INJECTION N/A 1/6/2020    Procedure: Lumbar L5/S1 IL XUAN;  Surgeon: Tacho Trivedi MD;  Location: [facility];  Service: Pain Management;  Laterality: N/A;    EPIDURAL STEROID INJECTION N/A 2/10/2020    Procedure: Caudal XUAN;  Surgeon: Tacho Trivedi MD;  Location: [facility] PAIN MGT;  Service: Pain Management;  Laterality: N/A;    ESOPHAGOGASTRODUODENOSCOPY N/A 9/22/2020    Procedure: EGD (ESOPHAGOGASTRODUODENOSCOPY);  Surgeon: Javier Farmer MD;  Location: [facility];  Service: Endoscopy;  Laterality: N/A;    INJECTION OF ANESTHETIC AGENT AROUND MEDIAL BRANCH NERVES INNERVATING LUMBAR FACET JOINT Bilateral 10/12/2020    Procedure: Bilateral L3-5 MBB;  Surgeon: Tacho Trivedi MD;  Location: [facility] PAIN MGT;  Service: Pain Management;  Laterality: Bilateral;    INJECTION OF ANESTHETIC AGENT AROUND MEDIAL BRANCH NERVES INNERVATING LUMBAR FACET JOINT Bilateral 12/21/2020    Procedure: Bilateral L3-5 MBB with steroid;  Surgeon: Tacho Trivedi MD;  Location: [facility] PAIN MGT;  Service: Pain Management;  Laterality: Bilateral;    INTRALUMINAL GASTROINTESTINAL TRACT IMAGING VIA CAPSULE N/A 12/29/2021    Procedure: IMAGING PROCEDURE, GI TRACT, INTRALUMINAL, VIA  CAPSULE;  Surgeon: Tahir Geronimo RN;  Location: [facility] ENDO;  Service: Endoscopy;  Laterality: N/A;    PROSTATE SURGERY      prostate radiation    RADIOFREQUENCY THERMOCOAGULATION Left 6/4/2021    Procedure: Left L3-5 Lumbar RFA;  Surgeon: Tacho Trivedi MD;  Location: [facility] PAIN MGT;  Service: Pain Management;  Laterality: Left;    RADIOFREQUENCY THERMOCOAGULATION Right 6/18/2021    Procedure: Right L3-5 Lumbar RFA;  Surgeon: Tacho Trivedi MD;  Location: [facility] PAIN MGT;  Service: Pain Management;  Laterality: Right;    SPINE SURGERY      fusion    TONSILLECTOMY         Review of patient's allergies indicates:  No Known Allergies    Current Facility-Administered Medications on File Prior to Encounter   Medication    ondansetron injection 4 mg     Current Outpatient Medications on File Prior to Encounter   Medication Sig    apixaban (ELIQUIS) 2.5 mg Tab Take 1 tablet (2.5 mg total) by mouth 2 (two) times daily.    aspirin 81 MG Chew Take 1 tablet (81 mg total) by mouth once daily.    atorvastatin (LIPITOR) 80 MG tablet One tablet daily (Patient taking differently: Take 80 mg by mouth every evening.)    cholecalciferol, vitamin D3, (VITAMIN D3) 25 mcg (1,000 unit) capsule Take 1,000 Units by mouth once daily.    clonazePAM (KLONOPIN) 1 MG tablet Take 1 tablet (1 mg total) by mouth nightly as needed for Anxiety.    fluticasone propionate (FLONASE) 50 mcg/actuation nasal spray 2 sprays (100 mcg total) by Each Nostril route once daily.    furosemide (LASIX) 40 MG tablet Take 1 tablet (40 mg total) by mouth 2 (two) times daily. Can double to 2 tablets twice a day for 3 days for more swelling/fluid build up - take 80mg twice a day    krill/om-3/dha/epa/phospho/ast (MEGARED OMEGA-3 KRILL OIL ORAL) Take 1 capsule by mouth every morning.    levocetirizine (XYZAL) 5 MG tablet TAKE 1 TABLET EVERY EVENING    losartan (COZAAR) 50 MG tablet Take 1 tablet (50 mg total) by mouth once daily.    magnesium oxide  (MAG-OX) 400 mg (241.3 mg magnesium) tablet Take 1 tablet (400 mg total) by mouth once daily.    multivitamin capsule Take 1 capsule by mouth once daily.    pantoprazole (PROTONIX) 40 MG tablet Take 1 tablet (40 mg total) by mouth once daily.    potassium chloride SA (K-DUR,KLOR-CON) 20 MEQ tablet Take 1 tablet (20 mEq total) by mouth once daily.    spironolactone (ALDACTONE) 25 MG tablet Take 1 tablet (25 mg total) by mouth once daily.    traMADoL (ULTRAM) 50 mg tablet Take 12.5 mg by mouth every 6 (six) hours as needed for Pain.    vit A/vit C/vit E/zinc/copper (OCUVITE PRESERVISION ORAL) Take 1 capsule by mouth every evening.    acetaminophen (TYLENOL) 650 MG TbSR Take 1,300 mg by mouth every 6 (six) hours as needed for Pain.    blood sugar diagnostic Strp Check blood glucose levels daily in the AM fasting and 1-2 times more daily.    lancets Misc Check blood glucose levels daily in the AM fasting and 1-2 times more daily.     Family History       Problem Relation (Age of Onset)    Cataracts Mother    Diabetes Mother    Heart attack Mother    Heart disease Mother, Father, Brother    Stroke Father          Tobacco Use    Smoking status: Former Smoker     Packs/day: 1.50     Years: 20.00     Pack years: 30.00     Types: Cigarettes     Start date:      Quit date:      Years since quittin.3    Smokeless tobacco: Never Used   Substance and Sexual Activity    Alcohol use: No    Drug use: No    Sexual activity: Not Currently     Review of Systems   Constitutional: Positive for malaise/fatigue and weight gain.   HENT: Negative.     Eyes: Negative.    Cardiovascular:  Positive for dyspnea on exertion and leg swelling.   Respiratory:  Positive for shortness of breath.    Endocrine: Negative.    Hematologic/Lymphatic: Negative.    Skin: Negative.    Musculoskeletal: Negative.    Gastrointestinal: Negative.    Genitourinary: Negative.    Neurological: Negative.    Psychiatric/Behavioral: Negative.      Allergic/Immunologic: Negative.    Objective:     Vital Signs (Most Recent):  Temp: 97.7 °F (36.5 °C) (04/29/22 0830)  Pulse: 61 (04/29/22 0830)  Resp: 20 (04/29/22 0830)  BP: 125/61 (04/29/22 0830)  SpO2: 98 % (04/29/22 0830) Vital Signs (24h Range):  Temp:  [97.5 °F (36.4 °C)-98.1 °F (36.7 °C)] 97.7 °F (36.5 °C)  Pulse:  [60-81] 61  Resp:  [18-21] 20  SpO2:  [95 %-100 %] 98 %  BP: (106-149)/(50-94) 125/61     Weight: 98.9 kg (218 lb)  Body mass index is 32.19 kg/m².    SpO2: 98 %  O2 Device (Oxygen Therapy): room air      Intake/Output Summary (Last 24 hours) at 4/29/2022 0906  Last data filed at 4/29/2022 0830  Gross per 24 hour   Intake 240 ml   Output 2 ml   Net 238 ml       Lines/Drains/Airways       Peripheral Intravenous Line  Duration                  Peripheral IV - Single Lumen 04/28/22 1602 20 G Left Antecubital <1 day                    Physical Exam  Vitals and nursing note reviewed.   Constitutional:       General: He is not in acute distress.     Appearance: Normal appearance. He is well-developed. He is not diaphoretic.   HENT:      Head: Normocephalic and atraumatic.   Eyes:      General:         Right eye: No discharge.         Left eye: No discharge.      Pupils: Pupils are equal, round, and reactive to light.   Neck:      Thyroid: No thyromegaly.      Vascular: No JVD.      Trachea: No tracheal deviation.   Cardiovascular:      Rate and Rhythm: Normal rate and regular rhythm.      Pulses: Intact distal pulses.      Heart sounds: Normal heart sounds, S1 normal and S2 normal. No murmur heard.    No friction rub.   Pulmonary:      Effort: Pulmonary effort is normal. No respiratory distress.      Breath sounds: Rales present. No wheezing.   Abdominal:      General: There is no distension.      Palpations: Abdomen is soft.      Tenderness: There is no rebound.   Musculoskeletal:      Cervical back: Neck supple.      Right lower leg: Edema present.      Left lower leg: Edema present.   Skin:      General: Skin is warm and dry.      Findings: No erythema.   Neurological:      Mental Status: He is alert and oriented to person, place, and time.   Psychiatric:         Mood and Affect: Mood normal.         Behavior: Behavior normal.         Thought Content: Thought content normal.       Significant Labs: CMP   Recent Labs   Lab 04/28/22  1600 04/29/22  0351    143   K 3.8 3.6   * 114*   CO2 20* 18*   * 97   BUN 19 17   CREATININE 1.5* 1.4   CALCIUM 8.5* 8.4*   PROT 6.5 5.8*   ALBUMIN 3.7 3.3*   BILITOT 0.4 0.5   ALKPHOS 91 81   AST 19 17   ALT 10 12   ANIONGAP 10 11   ESTGFRAFRICA 50* 55*   EGFRNONAA 44* 47*   , CBC   Recent Labs   Lab 04/28/22  1600 04/29/22  0351   WBC 3.95 3.53*   HGB 8.8* 8.4*   HCT 30.5* 29.4*    313   , INR No results for input(s): INR, PROTIME in the last 48 hours., Troponin   Recent Labs   Lab 04/28/22  1600   TROPONINI 0.019   , and All pertinent lab results from the last 24 hours have been reviewed.    Significant Imaging: Echocardiogram: Transthoracic echo (TTE) complete ([facility] Only):   Results for orders placed or performed during the hospital encounter of 04/28/22   Echo   Result Value Ref Range    BSA 2.19 m2    TDI SEPTAL 0.09 m/s    LV LATERAL E/E' RATIO 9.25 m/s    LV SEPTAL E/E' RATIO 12.33 m/s    IVC diameter 2.76 cm    Left Ventricular Outflow Tract Mean Velocity 0.84109549483754 cm/s    Left Ventricular Outflow Tract Mean Gradient 2.51 mmHg    TDI LATERAL 0.12 m/s    LVIDd 5.09 3.5 - 6.0 cm    IVS 1.10 0.6 - 1.1 cm    Posterior Wall 1.72 (A) 0.6 - 1.1 cm    Ao root annulus 2.73 cm    LVIDs 3.53 2.1 - 4.0 cm    FS 31 28 - 44 %    Sinus 2.84 cm    STJ 2.87 cm    Ascending aorta 2.53 cm    LV mass 302.65 g    LA size 5.19 cm    RVDD 3.47 cm    TAPSE 0.76 cm    Left Ventricle Relative Wall Thickness 0.68 cm    AV mean gradient 7 mmHg    AV valve area 1.53 cm2    AV Velocity Ratio 0.61     AV index (prosthetic) 0.56     PV peak gradient 0.90 mmHg    E/A  ratio 3.17     Mean e' 0.11 m/s    E wave deceleration time 209.905276711274811 msec    IVRT 34.082164590391443 msec    LVOT diameter 1.87 cm    LVOT area 2.7 cm2    LVOT peak wilian 1.08 m/s    LVOT peak VTI 20.50 cm    Ao peak wilian 1.78 m/s    Ao VTI 36.8 cm    RVOT peak wilian 0.47 m/s    RVOT peak VTI 12.8 cm    Mr max wilian 4.34 m/s    LVOT stroke volume 56.27 cm3    AV peak gradient 13 mmHg    PV mean gradient 0.59 mmHg    E/E' ratio 10.57 m/s    MV Peak E Wilian 1.11 m/s    TR Max Wilian 2.34 m/s    MV Peak A Wilian 0.35 m/s    LV Systolic Volume 51.80 mL    LV Systolic Volume Index 24.2 mL/m2    LV Diastolic Volume 123.09 mL    LV Diastolic Volume Index 57.52 mL/m2    LV Mass Index 141 g/m2    Echo EF Estimated 58 %    RA Major Axis 6.21 cm    Left Atrium Minor Axis 7.25 cm    Left Atrium Major Axis 7.07 cm    Triscuspid Valve Regurgitation Peak Gradient 22 mmHg    RA Width 5.24 cm   , EKG: Reviewed, and X-Ray: CXR: X-Ray Chest 1 View (CXR): No results found for this visit on 04/28/22. and X-Ray Chest PA and Lateral (CXR): No results found for this visit on 04/28/22.    Assessment and Plan:   Patient who presents with decompensated diastolic CHF, in part due to dietary indiscretion. Improving, continue IV diuresis for additional day. Reassess in AM. Echo pending.     * Acute on chronic diastolic congestive heart failure  -Presents with decompensated diastolic CHF, in part due to dietary indiscretion  -Continue IV diuresis  -Continue ARB, Aldactone  -Strict I's/O's  -Echo pending  -Patient counseled on dietary restrictions    Diabetes mellitus type 2 in obese  -Mgmt as per hospital medicine    Stage 4 chronic kidney disease  -Monitor with diuresis     Atrial fibrillation  -History of PAF, currently in SR  -Continue eliquis    CAD (coronary artery disease)  -Stable, chest pain free  -Continue OMT as tolerated  -Echo pending    Hypertension  -Continue home meds-Aldactone, Losartan  -Monitor BP trend        VTE Risk Mitigation  (From admission, onward)         Ordered     apixaban tablet 2.5 mg  2 times daily         04/28/22 2001     Place sequential compression device  Until discontinued         04/28/22 2005                Thank you for your consult. I will follow-up with patient. Please contact us if you have any additional questions.    Anuradha Richardson PA-C  Cardiology   [facility] - Telemetry ([facility])

## 2022-04-29 NOTE — H&P
[facility] - Telemetry ([facility]  History & Physical    Patient Name: Jake Ortiz  MRN: [id number]  Patient Class: OP- Observation  Admission Date: 4/28/2022  Attending Physician: Lulú Barnes MD   Primary Care Provider: Byron Stevens MD         Patient information was obtained from patient, past medical records and ER records.     Subjective:     Principal Problem:Acute on chronic diastolic congestive heart failure    Chief Complaint:   Chief Complaint   Patient presents with    Shortness of Breath     Told to come to ER by Dr. Hood for fluid overload.         HPI: 80 y/o WM with hx of pacemaker, diastolic HF, HTN, HLD, CAD, MI, CABG x3, DDD, psoriasis, prostate CA, Afib, cardiomegaly, restrictive lung disease, GERD, NASREEN, anemia, back pain, CKD4, DM2, tonsillectomy sent to ED from cardiology clinic with c/o gradually increasing SOB (worst on exertion), weight gain and BLE edema over the previous week. Symptoms are progressive and of moderate severity, without known mitigating factors. Denies chest pain, palpitations, wheezing, abdominal pain, N/V/D, constipation, dysuria, flank pain, HA, dizziness, weakness, fever, cough, chills, falls/trauma, blurred vision, focal deficits. Vital signs stable in ED - pt was afebrile; WBCs 3.95, H&H 8.8&30.5, platelets 336, Na 142, K+ 3.8, BUN 19, creatinine 1.5, GFR 44, , , CPK 97, troponin negative; COVID-19 negative, UA uninfected, Cxray showed interval improvement in bibasilar opacities; EKG showed Vpaced rhythm (62 BPM). In ED the pt was given 1g tylenol and 60mg IV lasix with good urine output - pt remained stable throughout. Hospital medicine was called and the pt was placed in observation with telemetry monitoring. Pt is a full code - surrogate decision maker is his wife, Xuan Ortiz.       Past Medical History:   Diagnosis Date    Abnormal PFT     Anemia     Arthritis     Atrial fibrillation 10/19/2017    Back pain     Congestive  heart failure 3/5/2018    Coronary artery disease     DM (diabetes mellitus) 2018     am 06/23/2020    DM (diabetes mellitus) 2016     am 08/17/2021    Hyperlipemia     Hypertension     Myocardial infarction     Obesity     NASREEN (obstructive sleep apnea) 6/5/2018    Prostate cancer 2015    Tobacco dependence     Type 2 diabetes mellitus        Past Surgical History:   Procedure Laterality Date    COLONOSCOPY N/A 9/22/2020    Procedure: COLONOSCOPY;  Surgeon: Javier Farmer MD;  Location: [facility] ENDO;  Service: Endoscopy;  Laterality: N/A;    CORONARY ARTERY BYPASS GRAFT  1987    EPIDURAL STEROID INJECTION N/A 11/22/2019    Procedure: Lumbar L5/S1 IL XUAN;  Surgeon: Tacho Trivedi MD;  Location: [facility];  Service: Pain Management;  Laterality: N/A;    EPIDURAL STEROID INJECTION N/A 1/6/2020    Procedure: Lumbar L5/S1 IL XUAN;  Surgeon: Tacho Trivedi MD;  Location: [facility];  Service: Pain Management;  Laterality: N/A;    EPIDURAL STEROID INJECTION N/A 2/10/2020    Procedure: Caudal XUAN;  Surgeon: Tacho Trivedi MD;  [facility];  Service: Pain Management;  Laterality: N/A;    ESOPHAGOGASTRODUODENOSCOPY N/A 9/22/2020    Procedure: EGD (ESOPHAGOGASTRODUODENOSCOPY);  Surgeon: Javier Farmer MD;  Location: [facility];  Service: Endoscopy;  Laterality: N/A;    INJECTION OF ANESTHETIC AGENT AROUND MEDIAL BRANCH NERVES INNERVATING LUMBAR FACET JOINT Bilateral 10/12/2020    Procedure: Bilateral L3-5 MBB;  Surgeon: Tacho Trivedi MD;  Location: [facility];  Service: Pain Management;  Laterality: Bilateral;    INJECTION OF ANESTHETIC AGENT AROUND MEDIAL BRANCH NERVES INNERVATING LUMBAR FACET JOINT Bilateral 12/21/2020    Procedure: Bilateral L3-5 MBB with steroid;  Surgeon: Tacho Trivedi MD;  [facility];  Service: Pain Management;  Laterality: Bilateral;    INTRALUMINAL GASTROINTESTINAL TRACT IMAGING VIA CAPSULE N/A  12/29/2021    Procedure: IMAGING PROCEDURE, GI TRACT, INTRALUMINAL, VIA CAPSULE;  Surgeon: Tahir Geronimo RN;  Location: [facility] ENDO;  Service: Endoscopy;  Laterality: N/A;    PROSTATE SURGERY      prostate radiation    RADIOFREQUENCY THERMOCOAGULATION Left 6/4/2021    Procedure: Left L3-5 Lumbar RFA;  Surgeon: Tacho Trivedi MD;  Location: [facility] PAIN MGT;  Service: Pain Management;  Laterality: Left;    RADIOFREQUENCY THERMOCOAGULATION Right 6/18/2021    Procedure: Right L3-5 Lumbar RFA;  Surgeon: Tacho Trivedi MD;  Location: [facility] PAIN MGT;  Service: Pain Management;  Laterality: Right;    SPINE SURGERY      fusion    TONSILLECTOMY         Review of patient's allergies indicates:  No Known Allergies    Current Facility-Administered Medications on File Prior to Encounter   Medication    ondansetron injection 4 mg     Current Outpatient Medications on File Prior to Encounter   Medication Sig    apixaban (ELIQUIS) 2.5 mg Tab Take 1 tablet (2.5 mg total) by mouth 2 (two) times daily.    aspirin 81 MG Chew Take 1 tablet (81 mg total) by mouth once daily.    atorvastatin (LIPITOR) 80 MG tablet One tablet daily (Patient taking differently: Take 80 mg by mouth every evening.)    cholecalciferol, vitamin D3, (VITAMIN D3) 25 mcg (1,000 unit) capsule Take 1,000 Units by mouth once daily.    clonazePAM (KLONOPIN) 1 MG tablet Take 1 tablet (1 mg total) by mouth nightly as needed for Anxiety.    fluticasone propionate (FLONASE) 50 mcg/actuation nasal spray 2 sprays (100 mcg total) by Each Nostril route once daily.    furosemide (LASIX) 40 MG tablet Take 1 tablet (40 mg total) by mouth 2 (two) times daily. Can double to 2 tablets twice a day for 3 days for more swelling/fluid build up - take 80mg twice a day    krill/om-3/dha/epa/phospho/ast (MEGARED OMEGA-3 KRILL OIL ORAL) Take 1 capsule by mouth every morning.    levocetirizine (XYZAL) 5 MG tablet TAKE 1 TABLET EVERY EVENING    losartan (COZAAR) 50 MG tablet  Take 1 tablet (50 mg total) by mouth once daily.    magnesium oxide (MAG-OX) 400 mg (241.3 mg magnesium) tablet Take 1 tablet (400 mg total) by mouth once daily.    multivitamin capsule Take 1 capsule by mouth once daily.    pantoprazole (PROTONIX) 40 MG tablet Take 1 tablet (40 mg total) by mouth once daily.    potassium chloride SA (K-DUR,KLOR-CON) 20 MEQ tablet Take 1 tablet (20 mEq total) by mouth once daily.    spironolactone (ALDACTONE) 25 MG tablet Take 1 tablet (25 mg total) by mouth once daily.    traMADoL (ULTRAM) 50 mg tablet Take 12.5 mg by mouth every 6 (six) hours as needed for Pain.    vit A/vit C/vit E/zinc/copper (OCUVITE PRESERVISION ORAL) Take 1 capsule by mouth every evening.    acetaminophen (TYLENOL) 650 MG TbSR Take 1,300 mg by mouth every 6 (six) hours as needed for Pain.    blood sugar diagnostic Strp Check blood glucose levels daily in the AM fasting and 1-2 times more daily.    lancets Misc Check blood glucose levels daily in the AM fasting and 1-2 times more daily.    [DISCONTINUED] furosemide (LASIX) 20 MG tablet Take 1 tablet (20 mg total) by mouth 2 (two) times daily. Can double to 2 tablets twice a day for 3 days for more swelling/fluid build up     Family History       Problem Relation (Age of Onset)    Cataracts Mother    Diabetes Mother    Heart attack Mother    Heart disease Mother, Father, Brother    Stroke Father          Tobacco Use    Smoking status: Former Smoker     Packs/day: 1.50     Years: 20.00     Pack years: 30.00     Types: Cigarettes     Start date:      Quit date:      Years since quittin.3    Smokeless tobacco: Never Used   Substance and Sexual Activity    Alcohol use: No    Drug use: No    Sexual activity: Not Currently     Review of Systems   Constitutional:  Positive for activity change (increased d/t SOB) and unexpected weight change (increased). Negative for chills, diaphoresis, fatigue and fever.   HENT:  Negative for congestion,  trouble swallowing and voice change.    Eyes:  Negative for photophobia and discharge.   Respiratory:  Positive for shortness of breath. Negative for cough, chest tightness and wheezing.    Cardiovascular:  Positive for leg swelling. Negative for chest pain and palpitations.   Gastrointestinal:  Negative for abdominal pain, blood in stool, constipation, diarrhea, nausea and vomiting.   Endocrine: Negative for cold intolerance and heat intolerance.   Genitourinary:  Negative for difficulty urinating, dysuria, flank pain, frequency and urgency.   Musculoskeletal:  Negative for back pain, joint swelling and myalgias.   Skin:  Negative for rash and wound.   Neurological:  Negative for dizziness, seizures, syncope, facial asymmetry, weakness, light-headedness, numbness and headaches.   Psychiatric/Behavioral:  Negative for confusion and hallucinations.    Objective:     Vital Signs (Most Recent):  Temp: 97.5 °F (36.4 °C) (04/28/22 1940)  Pulse: 68 (04/28/22 1940)  Resp: 18 (04/28/22 1940)  BP: (!) 149/94 (04/28/22 1940)  SpO2: 100 % (04/28/22 1940)   Vital Signs (24h Range):  Temp:  [97.5 °F (36.4 °C)-97.6 °F (36.4 °C)] 97.5 °F (36.4 °C)  Pulse:  [60-81] 68  Resp:  [18-21] 18  SpO2:  [98 %-100 %] 100 %  BP: (110-149)/(53-94) 149/94        There is no height or weight on file to calculate BMI.    Physical Exam  Vitals reviewed.   Constitutional:       General: He is not in acute distress.     Appearance: Normal appearance. He is obese. He is not toxic-appearing or diaphoretic.   HENT:      Head: Normocephalic and atraumatic.      Nose: Nose normal. No congestion.      Mouth/Throat:      Mouth: Mucous membranes are moist.   Eyes:      General: No scleral icterus.     Pupils: Pupils are equal, round, and reactive to light.   Cardiovascular:      Rate and Rhythm: Normal rate and regular rhythm.      Pulses: Normal pulses.      Heart sounds: Normal heart sounds.   Pulmonary:      Effort: Pulmonary effort is normal.       Breath sounds: Normal breath sounds. No wheezing or rhonchi.   Abdominal:      General: Abdomen is flat. Bowel sounds are normal. There is no distension.      Palpations: Abdomen is soft.      Tenderness: There is no abdominal tenderness. There is no rebound.   Musculoskeletal:         General: Normal range of motion.      Cervical back: Normal range of motion and neck supple. No tenderness.      Right lower leg: 3+ Pitting Edema present.      Left lower leg: 3+ Pitting Edema present.   Skin:     General: Skin is warm and dry.      Capillary Refill: Capillary refill takes less than 2 seconds.      Coloration: Skin is not jaundiced.      Findings: No bruising or erythema.   Neurological:      General: No focal deficit present.      Mental Status: He is alert and oriented to person, place, and time.      Sensory: No sensory deficit.      Motor: No weakness.   Psychiatric:         Mood and Affect: Mood normal.         Behavior: Behavior normal.         Thought Content: Thought content normal.         Judgment: Judgment normal.          Significant Labs:   Results for orders placed or performed during the hospital encounter of 04/28/22   CBC Auto Differential   Result Value Ref Range    WBC 3.95 3.90 - 12.70 K/uL    RBC 3.00 (L) 4.60 - 6.20 M/uL    Hemoglobin 8.8 (L) 14.0 - 18.0 g/dL    Hematocrit 30.5 (L) 40.0 - 54.0 %     (H) 82 - 98 fL    MCH 29.3 27.0 - 31.0 pg    MCHC 28.9 (L) 32.0 - 36.0 g/dL    RDW 19.7 (H) 11.5 - 14.5 %    Platelets 336 150 - 450 K/uL    MPV 9.0 (L) 9.2 - 12.9 fL    Immature Granulocytes 0.5 0.0 - 0.5 %    Gran # (ANC) 3.0 1.8 - 7.7 K/uL    Immature Grans (Abs) 0.02 0.00 - 0.04 K/uL    Lymph # 0.4 (L) 1.0 - 4.8 K/uL    Mono # 0.5 0.3 - 1.0 K/uL    Eos # 0.1 0.0 - 0.5 K/uL    Baso # 0.04 0.00 - 0.20 K/uL    nRBC 0 0 /100 WBC    Gran % 75.8 (H) 38.0 - 73.0 %    Lymph % 9.6 (L) 18.0 - 48.0 %    Mono % 11.6 4.0 - 15.0 %    Eosinophil % 1.5 0.0 - 8.0 %    Basophil % 1.0 0.0 - 1.9 %     Differential Method Automated    Comprehensive Metabolic Panel   Result Value Ref Range    Sodium 142 136 - 145 mmol/L    Potassium 3.8 3.5 - 5.1 mmol/L    Chloride 112 (H) 95 - 110 mmol/L    CO2 20 (L) 23 - 29 mmol/L    Glucose 112 (H) 70 - 110 mg/dL    BUN 19 8 - 23 mg/dL    Creatinine 1.5 (H) 0.5 - 1.4 mg/dL    Calcium 8.5 (L) 8.7 - 10.5 mg/dL    Total Protein 6.5 6.0 - 8.4 g/dL    Albumin 3.7 3.5 - 5.2 g/dL    Total Bilirubin 0.4 0.1 - 1.0 mg/dL    Alkaline Phosphatase 91 55 - 135 U/L    AST 19 10 - 40 U/L    ALT 10 10 - 44 U/L    Anion Gap 10 8 - 16 mmol/L    eGFR if African American 50 (A) >60 mL/min/1.73 m^2    eGFR if non African American 44 (A) >60 mL/min/1.73 m^2   Urinalysis, Reflex to Urine Culture Urine, Clean Catch    Specimen: Urine   Result Value Ref Range    Specimen UA Urine, Clean Catch     Color, UA Yellow Yellow, Straw, Beth    Appearance, UA Clear Clear    pH, UA 6.0 5.0 - 8.0    Specific Gravity, UA 1.020 1.005 - 1.030    Protein, UA Negative Negative    Glucose, UA Negative Negative    Ketones, UA Negative Negative    Bilirubin (UA) Negative Negative    Occult Blood UA Negative Negative    Nitrite, UA Negative Negative    Urobilinogen, UA Negative <2.0 EU/dL    Leukocytes, UA Negative Negative   BNP   Result Value Ref Range     (H) 0 - 99 pg/mL   CK   Result Value Ref Range    CPK 97 20 - 200 U/L   Troponin I   Result Value Ref Range    Troponin I 0.019 0.000 - 0.026 ng/mL   POCT COVID-19 Rapid Screening   Result Value Ref Range    POC Rapid COVID Negative Negative     Acceptable Yes      *Note: Due to a large number of results and/or encounters for the requested time period, some results have not been displayed. A complete set of results can be found in Results Review.       Significant Imaging:   Imaging Results              X-Ray Chest AP Portable (Final result)  Result time 04/28/22 15:37:45      Final result by Jer Le MD (04/28/22 15:37:45)                    Impression:      Interval improvement when compared to radiograph from 04/26/2022, as above.      Electronically signed by: Jer Rey  Date:    04/28/2022  Time:    15:37               Narrative:    EXAMINATION:  XR CHEST AP PORTABLE    CLINICAL HISTORY:  sob;    TECHNIQUE:  Single frontal view of the chest was performed.    COMPARISON:  Chest radiograph 04/26/2022.    FINDINGS:  Unchanged appearance of the left-sided pacing device and leads.  Low lung volumes and interval improvement in the bibasilar opacities, mild residual.  No effusion or pneumothorax.  Unchanged cardiomediastinal silhouette.  No free air under the diaphragm.  No acute osseous abnormality.                                        Results for orders placed or performed during the hospital encounter of 04/28/22   EKG 12-lead    Collection Time: 04/28/22  2:25 PM    Narrative    Test Reason : R06.02,    Vent. Rate : 062 BPM     Atrial Rate : 062 BPM     P-R Int : 000 ms          QRS Dur : 162 ms      QT Int : 492 ms       P-R-T Axes : 000 -86 097 degrees     QTc Int : 499 ms    Ventricular-paced rhythm  Biventricular pacemaker detected  Abnormal ECG  When compared with ECG of 26-APR-2022 15:36,  No significant change was found  Confirmed by JACI DAVILA MD ([id number]) on 4/28/2022 4:16:32 PM    Referred By: [email or web address]   SELF           Confirmed By:JACI DAVILA MD           Assessment/Plan:     * Acute on chronic diastolic congestive heart failure  Echo 06/01/2020 - EF 50%, diastolic dysfunction   / Cxray - interval improvement in bibasilar opacities  Cardiology consulted  Continue IV lasix pending cardiology input  ECHO pending  Strict I&Os  Daily weights  Cardiac diet         Hypertension  Currently well-controlled  Home medications resumed  Hydralazine IV prn SBP > 170  VS per unit routine  Monitor closely        Diabetes mellitus type 2 in obese  A1c at 5.1 on 03/17/2022 / BG in ED at 112  Accuchecks with SSI prn   ADA, cardiac  diet          Hyperlipemia  Resumed home statin, omega-3      Atrial fibrillation  Currently V. Paced - rate controlled  Resumed home medications including eliquis  Continuous cardiac monitoring  EKG prn       Gastroesophageal reflux disease  Resumed home PPI      CAD (coronary artery disease)  Denies chest pain, EKG unrevealing, troponin negative  Resumed home medications  Continuous cardiac monitoring  EKG prn       Stage 4 chronic kidney disease  Creatinine at 1.5 in ED / Baseline appears to be around 1.3-1.8  Monitor renal function labs closely  Avoid nephrotoxins as able  Monitor I&Os          VTE Risk Mitigation (From admission, onward)         Ordered     apixaban tablet 2.5 mg  2 times daily         04/28/22 2001     Place sequential compression device  Until discontinued         04/28/22 2005                   Tasha Griffiths NP  Department of Hospital Medicine   [facility] - Telemetry ([facility])

## 2022-04-29 NOTE — HPI
82 y/o WM with hx of pacemaker, diastolic HF, HTN, HLD, CAD, MI, CABG x3, DDD, psoriasis, prostate CA, Afib, cardiomegaly, restrictive lung disease, GERD, NASREEN, anemia, back pain, CKD4, DM2, tonsillectomy sent to ED from cardiology clinic with c/o gradually increasing SOB (worst on exertion), weight gain and BLE edema over the previous week. Symptoms are progressive and of moderate severity, without known mitigating factors. Denies chest pain, palpitations, wheezing, abdominal pain, N/V/D, constipation, dysuria, flank pain, HA, dizziness, weakness, fever, cough, chills, falls/trauma, blurred vision, focal deficits. Vital signs stable in ED - pt was afebrile; WBCs 3.95, H&H 8.8&30.5, platelets 336, Na 142, K+ 3.8, BUN 19, creatinine 1.5, GFR 44, , , CPK 97, troponin negative; COVID-19 negative, UA uninfected, Cxray showed interval improvement in bibasilar opacities; EKG showed Vpaced rhythm (62 BPM). In ED the pt was given 1g tylenol and 60mg IV lasix with good urine output - pt remained stable throughout. Hospital medicine was called and the pt was placed in observation with telemetry monitoring. Pt is a full code - surrogate decision maker is his wife, Xuan Ortiz.

## 2022-04-29 NOTE — SUBJECTIVE & OBJECTIVE
Past Medical History:   Diagnosis Date    Abnormal PFT     Anemia     Arthritis     Atrial fibrillation 10/19/2017    Back pain     Congestive heart failure 3/5/2018    Coronary artery disease     DM (diabetes mellitus) 2018     am 06/23/2020    DM (diabetes mellitus) 2016     am 08/17/2021    Hyperlipemia     Hypertension     Myocardial infarction     Obesity     NASREEN (obstructive sleep apnea) 6/5/2018    Prostate cancer 2015    Tobacco dependence     Type 2 diabetes mellitus        Past Surgical History:   Procedure Laterality Date    COLONOSCOPY N/A 9/22/2020    Procedure: COLONOSCOPY;  Surgeon: Javier Farmer MD;  Location: [facility] ENDO;  Service: Endoscopy;  Laterality: N/A;    CORONARY ARTERY BYPASS GRAFT  1987    EPIDURAL STEROID INJECTION N/A 11/22/2019    Procedure: Lumbar L5/S1 IL XUAN;  Surgeon: Tacho Trivedi MD;  Location: [facility]GT;  Service: Pain Management;  Laterality: N/A;    EPIDURAL STEROID INJECTION N/A 1/6/2020    Procedure: Lumbar L5/S1 IL XUAN;  Surgeon: Tacho Trivedi MD;  Location: [facility];  Service: Pain Management;  Laterality: N/A;    EPIDURAL STEROID INJECTION N/A 2/10/2020    Procedure: Caudal XUAN;  Surgeon: Tacho Trivedi MD;  Location: [facility];  Service: Pain Management;  Laterality: N/A;    ESOPHAGOGASTRODUODENOSCOPY N/A 9/22/2020    Procedure: EGD (ESOPHAGOGASTRODUODENOSCOPY);  Surgeon: Javier Farmer MD;  Location: [facility] ENDO;  Service: Endoscopy;  Laterality: N/A;    INJECTION OF ANESTHETIC AGENT AROUND MEDIAL BRANCH NERVES INNERVATING LUMBAR FACET JOINT Bilateral 10/12/2020    Procedure: Bilateral L3-5 MBB;  Surgeon: Tacho Trivedi MD;  Location: [facility];  Service: Pain Management;  Laterality: Bilateral;    INJECTION OF ANESTHETIC AGENT AROUND MEDIAL BRANCH NERVES INNERVATING LUMBAR FACET JOINT Bilateral 12/21/2020    Procedure: Bilateral L3-5 MBB with steroid;  Surgeon: Tacho Trivedi MD;  Location: [facility];   Service: Pain Management;  Laterality: Bilateral;    INTRALUMINAL GASTROINTESTINAL TRACT IMAGING VIA CAPSULE N/A 12/29/2021    Procedure: IMAGING PROCEDURE, GI TRACT, INTRALUMINAL, VIA CAPSULE;  Surgeon: Tahir Geronimo RN;  Location: [facility] ENDO;  Service: Endoscopy;  Laterality: N/A;    PROSTATE SURGERY      prostate radiation    RADIOFREQUENCY THERMOCOAGULATION Left 6/4/2021    Procedure: Left L3-5 Lumbar RFA;  Surgeon: Tacho Trivedi MD;  Location: [facility]GT;  Service: Pain Management;  Laterality: Left;    RADIOFREQUENCY THERMOCOAGULATION Right 6/18/2021    Procedure: Right L3-5 Lumbar RFA;  Surgeon: Tacho Trivedi MD;  Location: [facility]GT;  Service: Pain Management;  Laterality: Right;    SPINE SURGERY      fusion    TONSILLECTOMY         Review of patient's allergies indicates:  No Known Allergies    Current Facility-Administered Medications on File Prior to Encounter   Medication    ondansetron injection 4 mg     Current Outpatient Medications on File Prior to Encounter   Medication Sig    apixaban (ELIQUIS) 2.5 mg Tab Take 1 tablet (2.5 mg total) by mouth 2 (two) times daily.    aspirin 81 MG Chew Take 1 tablet (81 mg total) by mouth once daily.    atorvastatin (LIPITOR) 80 MG tablet One tablet daily (Patient taking differently: Take 80 mg by mouth every evening.)    cholecalciferol, vitamin D3, (VITAMIN D3) 25 mcg (1,000 unit) capsule Take 1,000 Units by mouth once daily.    clonazePAM (KLONOPIN) 1 MG tablet Take 1 tablet (1 mg total) by mouth nightly as needed for Anxiety.    fluticasone propionate (FLONASE) 50 mcg/actuation nasal spray 2 sprays (100 mcg total) by Each Nostril route once daily.    furosemide (LASIX) 40 MG tablet Take 1 tablet (40 mg total) by mouth 2 (two) times daily. Can double to 2 tablets twice a day for 3 days for more swelling/fluid build up - take 80mg twice a day    krill/om-3/dha/epa/phospho/ast (MEGARED OMEGA-3 KRILL OIL ORAL) Take 1 capsule by mouth every morning.     levocetirizine (XYZAL) 5 MG tablet TAKE 1 TABLET EVERY EVENING    losartan (COZAAR) 50 MG tablet Take 1 tablet (50 mg total) by mouth once daily.    magnesium oxide (MAG-OX) 400 mg (241.3 mg magnesium) tablet Take 1 tablet (400 mg total) by mouth once daily.    multivitamin capsule Take 1 capsule by mouth once daily.    pantoprazole (PROTONIX) 40 MG tablet Take 1 tablet (40 mg total) by mouth once daily.    potassium chloride SA (K-DUR,KLOR-CON) 20 MEQ tablet Take 1 tablet (20 mEq total) by mouth once daily.    spironolactone (ALDACTONE) 25 MG tablet Take 1 tablet (25 mg total) by mouth once daily.    traMADoL (ULTRAM) 50 mg tablet Take 12.5 mg by mouth every 6 (six) hours as needed for Pain.    vit A/vit C/vit E/zinc/copper (OCUVITE PRESERVISION ORAL) Take 1 capsule by mouth every evening.    acetaminophen (TYLENOL) 650 MG TbSR Take 1,300 mg by mouth every 6 (six) hours as needed for Pain.    blood sugar diagnostic Strp Check blood glucose levels daily in the AM fasting and 1-2 times more daily.    lancets Misc Check blood glucose levels daily in the AM fasting and 1-2 times more daily.    [DISCONTINUED] furosemide (LASIX) 20 MG tablet Take 1 tablet (20 mg total) by mouth 2 (two) times daily. Can double to 2 tablets twice a day for 3 days for more swelling/fluid build up     Family History       Problem Relation (Age of Onset)    Cataracts Mother    Diabetes Mother    Heart attack Mother    Heart disease Mother, Father, Brother    Stroke Father          Tobacco Use    Smoking status: Former Smoker     Packs/day: 1.50     Years: 20.00     Pack years: 30.00     Types: Cigarettes     Start date:      Quit date:      Years since quittin.3    Smokeless tobacco: Never Used   Substance and Sexual Activity    Alcohol use: No    Drug use: No    Sexual activity: Not Currently     Review of Systems   Constitutional:  Positive for activity change (increased d/t SOB) and unexpected weight change (increased).  Negative for chills, diaphoresis, fatigue and fever.   HENT:  Negative for congestion, trouble swallowing and voice change.    Eyes:  Negative for photophobia and discharge.   Respiratory:  Positive for shortness of breath. Negative for cough, chest tightness and wheezing.    Cardiovascular:  Positive for leg swelling. Negative for chest pain and palpitations.   Gastrointestinal:  Negative for abdominal pain, blood in stool, constipation, diarrhea, nausea and vomiting.   Endocrine: Negative for cold intolerance and heat intolerance.   Genitourinary:  Negative for difficulty urinating, dysuria, flank pain, frequency and urgency.   Musculoskeletal:  Negative for back pain, joint swelling and myalgias.   Skin:  Negative for rash and wound.   Neurological:  Negative for dizziness, seizures, syncope, facial asymmetry, weakness, light-headedness, numbness and headaches.   Psychiatric/Behavioral:  Negative for confusion and hallucinations.    Objective:     Vital Signs (Most Recent):  Temp: 97.5 °F (36.4 °C) (04/28/22 1940)  Pulse: 68 (04/28/22 1940)  Resp: 18 (04/28/22 1940)  BP: (!) 149/94 (04/28/22 1940)  SpO2: 100 % (04/28/22 1940)   Vital Signs (24h Range):  Temp:  [97.5 °F (36.4 °C)-97.6 °F (36.4 °C)] 97.5 °F (36.4 °C)  Pulse:  [60-81] 68  Resp:  [18-21] 18  SpO2:  [98 %-100 %] 100 %  BP: (110-149)/(53-94) 149/94        There is no height or weight on file to calculate BMI.    Physical Exam  Vitals reviewed.   Constitutional:       General: He is not in acute distress.     Appearance: Normal appearance. He is obese. He is not toxic-appearing or diaphoretic.   HENT:      Head: Normocephalic and atraumatic.      Nose: Nose normal. No congestion.      Mouth/Throat:      Mouth: Mucous membranes are moist.   Eyes:      General: No scleral icterus.     Pupils: Pupils are equal, round, and reactive to light.   Cardiovascular:      Rate and Rhythm: Normal rate and regular rhythm.      Pulses: Normal pulses.      Heart  sounds: Normal heart sounds.   Pulmonary:      Effort: Pulmonary effort is normal.      Breath sounds: Normal breath sounds. No wheezing or rhonchi.   Abdominal:      General: Abdomen is flat. Bowel sounds are normal. There is no distension.      Palpations: Abdomen is soft.      Tenderness: There is no abdominal tenderness. There is no rebound.   Musculoskeletal:         General: Normal range of motion.      Cervical back: Normal range of motion and neck supple. No tenderness.      Right lower leg: 3+ Pitting Edema present.      Left lower leg: 3+ Pitting Edema present.   Skin:     General: Skin is warm and dry.      Capillary Refill: Capillary refill takes less than 2 seconds.      Coloration: Skin is not jaundiced.      Findings: No bruising or erythema.   Neurological:      General: No focal deficit present.      Mental Status: He is alert and oriented to person, place, and time.      Sensory: No sensory deficit.      Motor: No weakness.   Psychiatric:         Mood and Affect: Mood normal.         Behavior: Behavior normal.         Thought Content: Thought content normal.         Judgment: Judgment normal.          Significant Labs:   Results for orders placed or performed during the hospital encounter of 04/28/22   CBC Auto Differential   Result Value Ref Range    WBC 3.95 3.90 - 12.70 K/uL    RBC 3.00 (L) 4.60 - 6.20 M/uL    Hemoglobin 8.8 (L) 14.0 - 18.0 g/dL    Hematocrit 30.5 (L) 40.0 - 54.0 %     (H) 82 - 98 fL    MCH 29.3 27.0 - 31.0 pg    MCHC 28.9 (L) 32.0 - 36.0 g/dL    RDW 19.7 (H) 11.5 - 14.5 %    Platelets 336 150 - 450 K/uL    MPV 9.0 (L) 9.2 - 12.9 fL    Immature Granulocytes 0.5 0.0 - 0.5 %    Gran # (ANC) 3.0 1.8 - 7.7 K/uL    Immature Grans (Abs) 0.02 0.00 - 0.04 K/uL    Lymph # 0.4 (L) 1.0 - 4.8 K/uL    Mono # 0.5 0.3 - 1.0 K/uL    Eos # 0.1 0.0 - 0.5 K/uL    Baso # 0.04 0.00 - 0.20 K/uL    nRBC 0 0 /100 WBC    Gran % 75.8 (H) 38.0 - 73.0 %    Lymph % 9.6 (L) 18.0 - 48.0 %    Mono %  11.6 4.0 - 15.0 %    Eosinophil % 1.5 0.0 - 8.0 %    Basophil % 1.0 0.0 - 1.9 %    Differential Method Automated    Comprehensive Metabolic Panel   Result Value Ref Range    Sodium 142 136 - 145 mmol/L    Potassium 3.8 3.5 - 5.1 mmol/L    Chloride 112 (H) 95 - 110 mmol/L    CO2 20 (L) 23 - 29 mmol/L    Glucose 112 (H) 70 - 110 mg/dL    BUN 19 8 - 23 mg/dL    Creatinine 1.5 (H) 0.5 - 1.4 mg/dL    Calcium 8.5 (L) 8.7 - 10.5 mg/dL    Total Protein 6.5 6.0 - 8.4 g/dL    Albumin 3.7 3.5 - 5.2 g/dL    Total Bilirubin 0.4 0.1 - 1.0 mg/dL    Alkaline Phosphatase 91 55 - 135 U/L    AST 19 10 - 40 U/L    ALT 10 10 - 44 U/L    Anion Gap 10 8 - 16 mmol/L    eGFR if African American 50 (A) >60 mL/min/1.73 m^2    eGFR if non African American 44 (A) >60 mL/min/1.73 m^2   Urinalysis, Reflex to Urine Culture Urine, Clean Catch    Specimen: Urine   Result Value Ref Range    Specimen UA Urine, Clean Catch     Color, UA Yellow Yellow, Straw, Beth    Appearance, UA Clear Clear    pH, UA 6.0 5.0 - 8.0    Specific Gravity, UA 1.020 1.005 - 1.030    Protein, UA Negative Negative    Glucose, UA Negative Negative    Ketones, UA Negative Negative    Bilirubin (UA) Negative Negative    Occult Blood UA Negative Negative    Nitrite, UA Negative Negative    Urobilinogen, UA Negative <2.0 EU/dL    Leukocytes, UA Negative Negative   BNP   Result Value Ref Range     (H) 0 - 99 pg/mL   CK   Result Value Ref Range    CPK 97 20 - 200 U/L   Troponin I   Result Value Ref Range    Troponin I 0.019 0.000 - 0.026 ng/mL   POCT COVID-19 Rapid Screening   Result Value Ref Range    POC Rapid COVID Negative Negative     Acceptable Yes      *Note: Due to a large number of results and/or encounters for the requested time period, some results have not been displayed. A complete set of results can be found in Results Review.       Significant Imaging:   Imaging Results              X-Ray Chest AP Portable (Final result)  Result time 04/28/22  15:37:45      Final result by Jer Le MD (04/28/22 15:37:45)                   Impression:      Interval improvement when compared to radiograph from 04/26/2022, as above.      Electronically signed by: Jer Le  Date:    04/28/2022  Time:    15:37               Narrative:    EXAMINATION:  XR CHEST AP PORTABLE    CLINICAL HISTORY:  sob;    TECHNIQUE:  Single frontal view of the chest was performed.    COMPARISON:  Chest radiograph 04/26/2022.    FINDINGS:  Unchanged appearance of the left-sided pacing device and leads.  Low lung volumes and interval improvement in the bibasilar opacities, mild residual.  No effusion or pneumothorax.  Unchanged cardiomediastinal silhouette.  No free air under the diaphragm.  No acute osseous abnormality.                                        Results for orders placed or performed during the hospital encounter of 04/28/22   EKG 12-lead    Collection Time: 04/28/22  2:25 PM    Narrative    Test Reason : R06.02,    Vent. Rate : 062 BPM     Atrial Rate : 062 BPM     P-R Int : 000 ms          QRS Dur : 162 ms      QT Int : 492 ms       P-R-T Axes : 000 -86 097 degrees     QTc Int : 499 ms    Ventricular-paced rhythm  Biventricular pacemaker detected  Abnormal ECG  When compared with ECG of 26-APR-2022 15:36,  No significant change was found  Confirmed by JACI DAVILA MD ([id number]) on 4/28/2022 4:16:32 PM    Referred By: RIP   SELF           Confirmed By:JACI DAVILA MD

## 2022-04-29 NOTE — PROGRESS NOTES
Inpatient heart failure nurse visit    Spoke with pt and Kendal, spouse at bedside.  I have reviewed information about the [facility] heart failure management program with the patient including a 48 hour f/u call after discharge to check on patient and a f/u clinic appt to be scheduled  within a week after discharge. Provided brochure with contact information and signs/symptoms to watch for and report to heart failure staff nurses after discharge. Hospital f/u appt scheduled.    CHF education reviewed with pt in detail  Reviewed and provided written brochures, 8 step plan for heart failure patients and daily HF check list.   Recommend 2 gram sodium restriction and 1500cc fluid restriction.  Encourage physical activity with graded exercise program.  Requested patient to weigh themselves daily, and to notify us if their weight increases by more than 2 lbs in 1 day or 5 lbs in 1 week.

## 2022-04-29 NOTE — PLAN OF CARE
Plan of care reviewed with patient, pt and family member verbalized understanding.  Pt remains free from falls this shift, safety precautions in place.bed alarm set.  Pt remains free from skin breakdown, pt educated on the importance of frequent weight shift to decrease the risk of pressure injury. Pt verbalized understanding teaching and outcomes.  AAOx4,NAD noted at this time.  PIV 20 G to L AC , Saline locked  Pt remained afebrile.  NSR and A paced on the tele monitor  Pt admitted for CHF.  Pt currently resting comfortably in bed.  Hourly rounding complete. Bed in lowest position, side rails up, call light in reach.  wife remains at the bedside.

## 2022-04-29 NOTE — PLAN OF CARE
[facility] - Telemetry (Hospital)  Discharge Assessment    Primary Care Provider: Byron Stevens MD     Discharge Assessment (most recent)     BRIEF DISCHARGE ASSESSMENT - 04/29/22 7809        Discharge Planning    Assessment Type Discharge Planning Brief Assessment     Resource/Environmental Concerns none     Support Systems Spouse/significant other     Equipment Currently Used at Home none     Current Living Arrangements home/apartment/condo     Patient/Family Anticipates Transition to home with family     Patient/Family Anticipated Services at Transition none     DME Needed Upon Discharge  none     Discharge Plan A Home with family                 Anticipated DC dispo: home with spouse  Prior Level of Function: independent, drives, denies use of DME/HHC.  PCP: Dr. Stevens  Comments:  CM met with patient and spouse at bedside to introduce role and discuss d/c planning. Patient is independent, denies needs by CM.     CM provided a transitional care folder, information on advanced directives, information on pharmacy bedside delivery, and discharge planning begins on admission with contact information for any needs/questions.

## 2022-04-29 NOTE — SUBJECTIVE & OBJECTIVE
Past Medical History:   Diagnosis Date    Abnormal PFT     Anemia     Arthritis     Atrial fibrillation 10/19/2017    Back pain     Congestive heart failure 3/5/2018    Coronary artery disease     DM (diabetes mellitus) 2018     am 06/23/2020    DM (diabetes mellitus) 2016     am 08/17/2021    Hyperlipemia     Hypertension     Myocardial infarction     Obesity     NASREEN (obstructive sleep apnea) 6/5/2018    Prostate cancer 2015    Tobacco dependence     Type 2 diabetes mellitus        Past Surgical History:   Procedure Laterality Date    COLONOSCOPY N/A 9/22/2020    Procedure: COLONOSCOPY;  Surgeon: Javier Farmer MD;  Location: [facility] ENDO;  Service: Endoscopy;  Laterality: N/A;    CORONARY ARTERY BYPASS GRAFT  1987    EPIDURAL STEROID INJECTION N/A 11/22/2019    Procedure: Lumbar L5/S1 IL XUAN;  Surgeon: Tacho Trivedi MD;  Location: [facility]GT;  Service: Pain Management;  Laterality: N/A;    EPIDURAL STEROID INJECTION N/A 1/6/2020    Procedure: Lumbar L5/S1 IL XUAN;  Surgeon: Tacho Trivedi MD;  Location: [facility];  Service: Pain Management;  Laterality: N/A;    EPIDURAL STEROID INJECTION N/A 2/10/2020    Procedure: Caudal XUAN;  Surgeon: Tacho Trivedi MD;  Location: [facility];  Service: Pain Management;  Laterality: N/A;    ESOPHAGOGASTRODUODENOSCOPY N/A 9/22/2020    Procedure: EGD (ESOPHAGOGASTRODUODENOSCOPY);  Surgeon: Javier Farmer MD;  Location: [facility] ENDO;  Service: Endoscopy;  Laterality: N/A;    INJECTION OF ANESTHETIC AGENT AROUND MEDIAL BRANCH NERVES INNERVATING LUMBAR FACET JOINT Bilateral 10/12/2020    Procedure: Bilateral L3-5 MBB;  Surgeon: Tacho Trivedi MD;  Location: [facility];  Service: Pain Management;  Laterality: Bilateral;    INJECTION OF ANESTHETIC AGENT AROUND MEDIAL BRANCH NERVES INNERVATING LUMBAR FACET JOINT Bilateral 12/21/2020    Procedure: Bilateral L3-5 MBB with steroid;  Surgeon: Tacho Trivedi MD;  Location: [facility];   Service: Pain Management;  Laterality: Bilateral;    INTRALUMINAL GASTROINTESTINAL TRACT IMAGING VIA CAPSULE N/A 12/29/2021    Procedure: IMAGING PROCEDURE, GI TRACT, INTRALUMINAL, VIA CAPSULE;  Surgeon: Tahir Geronimo RN;  Location: [facility] ENDO;  Service: Endoscopy;  Laterality: N/A;    PROSTATE SURGERY      prostate radiation    RADIOFREQUENCY THERMOCOAGULATION Left 6/4/2021    Procedure: Left L3-5 Lumbar RFA;  Surgeon: Tacho Trivedi MD;  Location: [facility]IN [facility]GT;  Service: Pain Management;  Laterality: Left;    RADIOFREQUENCY THERMOCOAGULATION Right 6/18/2021    Procedure: Right L3-5 Lumbar RFA;  Surgeon: Tacho Trivedi MD;  Location: [facility]IN [facility]GT;  Service: Pain Management;  Laterality: Right;    SPINE SURGERY      fusion    TONSILLECTOMY         Review of patient's allergies indicates:  No Known Allergies    Current Facility-Administered Medications on File Prior to Encounter   Medication    ondansetron injection 4 mg     Current Outpatient Medications on File Prior to Encounter   Medication Sig    apixaban (ELIQUIS) 2.5 mg Tab Take 1 tablet (2.5 mg total) by mouth 2 (two) times daily.    aspirin 81 MG Chew Take 1 tablet (81 mg total) by mouth once daily.    atorvastatin (LIPITOR) 80 MG tablet One tablet daily (Patient taking differently: Take 80 mg by mouth every evening.)    cholecalciferol, vitamin D3, (VITAMIN D3) 25 mcg (1,000 unit) capsule Take 1,000 Units by mouth once daily.    clonazePAM (KLONOPIN) 1 MG tablet Take 1 tablet (1 mg total) by mouth nightly as needed for Anxiety.    fluticasone propionate (FLONASE) 50 mcg/actuation nasal spray 2 sprays (100 mcg total) by Each Nostril route once daily.    furosemide (LASIX) 40 MG tablet Take 1 tablet (40 mg total) by mouth 2 (two) times daily. Can double to 2 tablets twice a day for 3 days for more swelling/fluid build up - take 80mg twice a day    krill/om-3/dha/epa/phospho/ast (MEGARED OMEGA-3 KRILL OIL ORAL) Take 1 capsule by mouth every morning.     levocetirizine (XYZAL) 5 MG tablet TAKE 1 TABLET EVERY EVENING    losartan (COZAAR) 50 MG tablet Take 1 tablet (50 mg total) by mouth once daily.    magnesium oxide (MAG-OX) 400 mg (241.3 mg magnesium) tablet Take 1 tablet (400 mg total) by mouth once daily.    multivitamin capsule Take 1 capsule by mouth once daily.    pantoprazole (PROTONIX) 40 MG tablet Take 1 tablet (40 mg total) by mouth once daily.    potassium chloride SA (K-DUR,KLOR-CON) 20 MEQ tablet Take 1 tablet (20 mEq total) by mouth once daily.    spironolactone (ALDACTONE) 25 MG tablet Take 1 tablet (25 mg total) by mouth once daily.    traMADoL (ULTRAM) 50 mg tablet Take 12.5 mg by mouth every 6 (six) hours as needed for Pain.    vit A/vit C/vit E/zinc/copper (OCUVITE PRESERVISION ORAL) Take 1 capsule by mouth every evening.    acetaminophen (TYLENOL) 650 MG TbSR Take 1,300 mg by mouth every 6 (six) hours as needed for Pain.    blood sugar diagnostic Strp Check blood glucose levels daily in the AM fasting and 1-2 times more daily.    lancets Misc Check blood glucose levels daily in the AM fasting and 1-2 times more daily.     Family History       Problem Relation (Age of Onset)    Cataracts Mother    Diabetes Mother    Heart attack Mother    Heart disease Mother, Father, Brother    Stroke Father          Tobacco Use    Smoking status: Former Smoker     Packs/day: 1.50     Years: 20.00     Pack years: 30.00     Types: Cigarettes     Start date:      Quit date:      Years since quittin.3    Smokeless tobacco: Never Used   Substance and Sexual Activity    Alcohol use: No    Drug use: No    Sexual activity: Not Currently     Review of Systems   Constitutional: Positive for malaise/fatigue and weight gain.   HENT: Negative.     Eyes: Negative.    Cardiovascular:  Positive for dyspnea on exertion and leg swelling.   Respiratory:  Positive for shortness of breath.    Endocrine: Negative.    Hematologic/Lymphatic: Negative.    Skin: Negative.     Musculoskeletal: Negative.    Gastrointestinal: Negative.    Genitourinary: Negative.    Neurological: Negative.    Psychiatric/Behavioral: Negative.     Allergic/Immunologic: Negative.    Objective:     Vital Signs (Most Recent):  Temp: 97.7 °F (36.5 °C) (04/29/22 0830)  Pulse: 61 (04/29/22 0830)  Resp: 20 (04/29/22 0830)  BP: 125/61 (04/29/22 0830)  SpO2: 98 % (04/29/22 0830) Vital Signs (24h Range):  Temp:  [97.5 °F (36.4 °C)-98.1 °F (36.7 °C)] 97.7 °F (36.5 °C)  Pulse:  [60-81] 61  Resp:  [18-21] 20  SpO2:  [95 %-100 %] 98 %  BP: (106-149)/(50-94) 125/61     Weight: 98.9 kg (218 lb)  Body mass index is 32.19 kg/m².    SpO2: 98 %  O2 Device (Oxygen Therapy): room air      Intake/Output Summary (Last 24 hours) at 4/29/2022 0906  Last data filed at 4/29/2022 0830  Gross per 24 hour   Intake 240 ml   Output 2 ml   Net 238 ml       Lines/Drains/Airways       Peripheral Intravenous Line  Duration                  Peripheral IV - Single Lumen 04/28/22 1602 20 G Left Antecubital <1 day                    Physical Exam  Vitals and nursing note reviewed.   Constitutional:       General: He is not in acute distress.     Appearance: Normal appearance. He is well-developed. He is not diaphoretic.   HENT:      Head: Normocephalic and atraumatic.   Eyes:      General:         Right eye: No discharge.         Left eye: No discharge.      Pupils: Pupils are equal, round, and reactive to light.   Neck:      Thyroid: No thyromegaly.      Vascular: No JVD.      Trachea: No tracheal deviation.   Cardiovascular:      Rate and Rhythm: Normal rate and regular rhythm.      Pulses: Intact distal pulses.      Heart sounds: Normal heart sounds, S1 normal and S2 normal. No murmur heard.    No friction rub.   Pulmonary:      Effort: Pulmonary effort is normal. No respiratory distress.      Breath sounds: Rales present. No wheezing.   Abdominal:      General: There is no distension.      Palpations: Abdomen is soft.      Tenderness: There  is no rebound.   Musculoskeletal:      Cervical back: Neck supple.      Right lower leg: Edema present.      Left lower leg: Edema present.   Skin:     General: Skin is warm and dry.      Findings: No erythema.   Neurological:      Mental Status: He is alert and oriented to person, place, and time.   Psychiatric:         Mood and Affect: Mood normal.         Behavior: Behavior normal.         Thought Content: Thought content normal.       Significant Labs: CMP   Recent Labs   Lab 04/28/22  1600 04/29/22  0351    143   K 3.8 3.6   * 114*   CO2 20* 18*   * 97   BUN 19 17   CREATININE 1.5* 1.4   CALCIUM 8.5* 8.4*   PROT 6.5 5.8*   ALBUMIN 3.7 3.3*   BILITOT 0.4 0.5   ALKPHOS 91 81   AST 19 17   ALT 10 12   ANIONGAP 10 11   ESTGFRAFRICA 50* 55*   EGFRNONAA 44* 47*   , CBC   Recent Labs   Lab 04/28/22  1600 04/29/22  0351   WBC 3.95 3.53*   HGB 8.8* 8.4*   HCT 30.5* 29.4*    313   , INR No results for input(s): INR, PROTIME in the last 48 hours., Troponin   Recent Labs   Lab 04/28/22  1600   TROPONINI 0.019   , and All pertinent lab results from the last 24 hours have been reviewed.    Significant Imaging: Echocardiogram: Transthoracic echo (TTE) complete ([facility] Only):   Results for orders placed or performed during the hospital encounter of 04/28/22   Echo   Result Value Ref Range    BSA 2.19 m2    TDI SEPTAL 0.09 m/s    LV LATERAL E/E' RATIO 9.25 m/s    LV SEPTAL E/E' RATIO 12.33 m/s    IVC diameter 2.76 cm    Left Ventricular Outflow Tract Mean Velocity 0.85567852140970 cm/s    Left Ventricular Outflow Tract Mean Gradient 2.51 mmHg    TDI LATERAL 0.12 m/s    LVIDd 5.09 3.5 - 6.0 cm    IVS 1.10 0.6 - 1.1 cm    Posterior Wall 1.72 (A) 0.6 - 1.1 cm    Ao root annulus 2.73 cm    LVIDs 3.53 2.1 - 4.0 cm    FS 31 28 - 44 %    Sinus 2.84 cm    STJ 2.87 cm    Ascending aorta 2.53 cm    LV mass 302.65 g    LA size 5.19 cm    RVDD 3.47 cm    TAPSE 0.76 cm    Left Ventricle Relative Wall Thickness 0.68  cm    AV mean gradient 7 mmHg    AV valve area 1.53 cm2    AV Velocity Ratio 0.61     AV index (prosthetic) 0.56     PV peak gradient 0.90 mmHg    E/A ratio 3.17     Mean e' 0.11 m/s    E wave deceleration time 209.765166090444063 msec    IVRT 34.558751438157558 msec    LVOT diameter 1.87 cm    LVOT area 2.7 cm2    LVOT peak wilian 1.08 m/s    LVOT peak VTI 20.50 cm    Ao peak wilian 1.78 m/s    Ao VTI 36.8 cm    RVOT peak wilian 0.47 m/s    RVOT peak VTI 12.8 cm    Mr max wilian 4.34 m/s    LVOT stroke volume 56.27 cm3    AV peak gradient 13 mmHg    PV mean gradient 0.59 mmHg    E/E' ratio 10.57 m/s    MV Peak E Wilian 1.11 m/s    TR Max Wilian 2.34 m/s    MV Peak A Wilian 0.35 m/s    LV Systolic Volume 51.80 mL    LV Systolic Volume Index 24.2 mL/m2    LV Diastolic Volume 123.09 mL    LV Diastolic Volume Index 57.52 mL/m2    LV Mass Index 141 g/m2    Echo EF Estimated 58 %    RA Major Axis 6.21 cm    Left Atrium Minor Axis 7.25 cm    Left Atrium Major Axis 7.07 cm    Triscuspid Valve Regurgitation Peak Gradient 22 mmHg    RA Width 5.24 cm   , EKG: Reviewed, and X-Ray: CXR: X-Ray Chest 1 View (CXR): No results found for this visit on 04/28/22. and X-Ray Chest PA and Lateral (CXR): No results found for this visit on 04/28/22.

## 2022-04-29 NOTE — ASSESSMENT & PLAN NOTE
Denies chest pain, EKG unrevealing, troponin negative  Resumed home medications  Continuous cardiac monitoring  EKG prn

## 2022-04-29 NOTE — ASSESSMENT & PLAN NOTE
Echo 06/01/2020 - EF 50%, diastolic dysfunction   / Cxray - interval improvement in bibasilar opacities  Cardiology consulted  Continue IV lasix pending cardiology input  ECHO pending  Strict I&Os  Daily weights  Cardiac diet

## 2022-04-29 NOTE — ASSESSMENT & PLAN NOTE
Currently V. Paced - rate controlled  Resumed home medications including eliquis  Continuous cardiac monitoring  EKG prn

## 2022-04-29 NOTE — HPI
Mr. Ortiz is a 81 year old male patient whose current medical conditions include CAD s/p CABG, old MI, HFpEF with TR/MR, AVB s/p CRT, PAF on Eliquis, anemia, HTN, hyperlipidemia, DM type II, NASREEN, CKD stage IV, restrictive/obstructive lung disease who was sent to [facility] ED from cardiology clinic due to CHF symptoms. Patient complained of increased SOB associated with BLE edema and weight gain over the past few days. He denied any associated allison chest pain, fever, chills, palpitations, near syncope, or syncope. Initial workup in ED revealed BNP of 615 and anemia (H/H 8.4/29.4) and patient was subsequently admitted for further evaluation and treatment. Cardiology consulted to assist with management. Patient seen and examined today, resting in bed. Feeling better, states SOB and BLE edema have improved. No allison chest pain. I's/O's not charted correctly as patient accidentally spilled his urinal. He reports compliance with his medications. Followed routinely on OP basis by Dr. Hood. Admits to recent dietary indiscretion-ate crawfish. Echo pending.

## 2022-04-29 NOTE — ASSESSMENT & PLAN NOTE
-Presents with decompensated diastolic CHF, in part due to dietary indiscretion  -Continue IV diuresis  -Continue ARB, Aldactone  -Strict I's/O's  -Echo pending  -Patient counseled on dietary restrictions

## 2022-04-29 NOTE — ASSESSMENT & PLAN NOTE
Creatinine at 1.5 in ED / Baseline appears to be around 1.3-1.8  Monitor renal function labs closely  Avoid nephrotoxins as able  Monitor I&Os  4/29 Monitor  Trend BMP  Renal dose all medications

## 2022-04-29 NOTE — ASSESSMENT & PLAN NOTE
Echo 06/01/2020 - EF 50%, diastolic dysfunction   / Cxray - interval improvement in bibasilar opacities  Cardiology consulted  Continue IV lasix pending cardiology input  ECHO pending  Strict I&Os  Daily weights  Cardiac diet   4/29 CHF pathway  Continue Iv diuretics

## 2022-04-29 NOTE — HOSPITAL COURSE
The patient was monitored closely during his stay. He was kept on continuous telemetry monitoring. Patient was placed on iv diuretics for his CHF and bilateral pleural effusions. Cardiology  was consulted. An echocardiogram was ordered. CHF pathway was initiated. Patient was given a dose of sq epogen for his anemia of CKD. His MVI was continued. Patient's overall condition remained stable and improved and he was discharged to home. Patient was to follow up with Cardiology as Outpatient. Patient declined Home Health.

## 2022-04-29 NOTE — PROGRESS NOTES
[facility] - Telemetry ([facility])  [facility] Medicine  Progress Note    Patient Name: Jake Ortiz  MRN: [id number]  Patient Class: OP- Observation   Admission Date: 4/28/2022  Length of Stay: 0 days  Attending Physician: Lulú Barnes MD  Primary Care Provider: Byron Stevens MD      Subjective:     Principal Problem:Acute on chronic diastolic congestive heart failure    HPI:  80 y/o WM with hx of pacemaker, diastolic HF, HTN, HLD, CAD, MI, CABG x3, DDD, psoriasis, prostate CA, Afib, cardiomegaly, restrictive lung disease, GERD, NASREEN, anemia, back pain, CKD4, DM2, tonsillectomy sent to ED from cardiology clinic with c/o gradually increasing SOB (worst on exertion), weight gain and BLE edema over the previous week. Symptoms are progressive and of moderate severity, without known mitigating factors. Denies chest pain, palpitations, wheezing, abdominal pain, N/V/D, constipation, dysuria, flank pain, HA, dizziness, weakness, fever, cough, chills, falls/trauma, blurred vision, focal deficits. Vital signs stable in ED - pt was afebrile; WBCs 3.95, H&H 8.8&30.5, platelets 336, Na 142, K+ 3.8, BUN 19, creatinine 1.5, GFR 44, , , CPK 97, troponin negative; COVID-19 negative, UA uninfected, Cxray showed interval improvement in bibasilar opacities; EKG showed Vpaced rhythm (62 BPM). In ED the pt was given 1g tylenol and 60mg IV lasix with good urine output - pt remained stable throughout. Hospital medicine was called and the pt was placed in observation with telemetry monitoring. Pt is a full code - surrogate decision maker is his wife, Xuan Ortiz.     Overview/Hospital Course:  The patient was monitored closely during his stay. He was kept on continuous telemetry monitoring. Patient was placed on iv diuretics for hid CHF and bilateral pleural effusions. Cardiology  was consulted. An echocardiogram was ordered. CHF pathway initiated. Patient was given a dose of sq epogen for his anemia of CKD. His MVI was  "continued.       Interval History: Continue current treatment.     Review of Systems   Constitutional:  Positive for activity change (increased d/t SOB) and fatigue. Negative for appetite change, chills, diaphoresis and fever.   HENT:  Negative for congestion, ear discharge, ear pain, facial swelling, trouble swallowing and voice change.    Eyes:  Negative for pain and redness.   Respiratory:  Positive for shortness of breath. Negative for cough, chest tightness and wheezing.    Cardiovascular:  Positive for leg swelling. Negative for chest pain and palpitations.   Gastrointestinal:  Positive for diarrhea. Negative for abdominal distention, abdominal pain, blood in stool, constipation, nausea and vomiting.        Reports chronic intermittent diarrhea for "months"   Endocrine: Negative for polydipsia and polyphagia.   Genitourinary:  Negative for difficulty urinating, dysuria, flank pain, frequency and urgency.   Musculoskeletal:  Positive for back pain. Negative for neck pain and neck stiffness.        Chronic back pain   Skin:  Negative for color change.   Allergic/Immunologic: Negative for food allergies.   Neurological:  Negative for syncope, facial asymmetry, speech difficulty and weakness.   Hematological:  Does not bruise/bleed easily.   Psychiatric/Behavioral:  Negative for agitation, behavioral problems, confusion, hallucinations and suicidal ideas. The patient is not nervous/anxious.    Objective:     Vital Signs (Most Recent):  Temp: 97.7 °F (36.5 °C) (04/29/22 1554)  Pulse: 60 (04/29/22 1554)  Resp: 20 (04/29/22 1554)  BP: 136/61 (04/29/22 1554)  SpO2: 98 % (04/29/22 1554) Vital Signs (24h Range):  Temp:  [97.5 °F (36.4 °C)-98.1 °F (36.7 °C)] 97.7 °F (36.5 °C)  Pulse:  [60-73] 60  Resp:  [18-21] 20  SpO2:  [95 %-100 %] 98 %  BP: (106-149)/(50-94) 136/61     Weight: 98.9 kg (218 lb)  Body mass index is 32.19 kg/m².    Intake/Output Summary (Last 24 hours) at 4/29/2022 1620  Last data filed at 4/29/2022 " 1400  Gross per 24 hour   Intake 720 ml   Output 2 ml   Net 718 ml      Physical Exam  Vitals reviewed.   Constitutional:       General: He is not in acute distress.     Appearance: He is obese. He is not diaphoretic.   HENT:      Head: Normocephalic and atraumatic.      Mouth/Throat:      Mouth: Mucous membranes are moist.   Eyes:      General: No scleral icterus.        Right eye: No discharge.         Left eye: No discharge.      Extraocular Movements: Extraocular movements intact.      Conjunctiva/sclera: Conjunctivae normal.      Pupils: Pupils are equal, round, and reactive to light.   Cardiovascular:      Rate and Rhythm: Normal rate and regular rhythm.      Pulses: Normal pulses.      Heart sounds: Normal heart sounds.   Pulmonary:      Breath sounds: No wheezing or rhonchi.      Comments: BBS diminished    SOB with exertion  Abdominal:      General: Abdomen is flat. Bowel sounds are normal. There is no distension.      Palpations: Abdomen is soft.      Tenderness: There is no abdominal tenderness. There is no guarding.   Genitourinary:     Comments: Not examined  Musculoskeletal:         General: Normal range of motion.      Cervical back: Normal range of motion and neck supple. No rigidity or tenderness.      Right lower leg: Edema present.      Left lower leg: Edema present.      Comments: BLE 1- 2 plus edema   Skin:     General: Skin is warm and dry.      Capillary Refill: Capillary refill takes less than 2 seconds.      Coloration: Skin is not jaundiced.      Findings: No bruising or erythema.   Neurological:      General: No focal deficit present.      Mental Status: He is alert and oriented to person, place, and time. Mental status is at baseline.   Psychiatric:         Mood and Affect: Mood normal.         Behavior: Behavior normal.         Thought Content: Thought content normal.         Judgment: Judgment normal.     Lab Results   Component Value Date    WBC 3.53 (L) 04/29/2022    HGB 8.4 (L)  04/29/2022    HCT 29.4 (L) 04/29/2022     (H) 04/29/2022     04/29/2022       BMP  Lab Results   Component Value Date     04/29/2022    K 3.6 04/29/2022     (H) 04/29/2022    CO2 18 (L) 04/29/2022    BUN 17 04/29/2022    CREATININE 1.4 04/29/2022    CALCIUM 8.4 (L) 04/29/2022    ANIONGAP 11 04/29/2022    ESTGFRAFRICA 55 (A) 04/29/2022    EGFRNONAA 47 (A) 04/29/2022         Assessment/Plan:      * Acute on chronic diastolic congestive heart failure  Echo 06/01/2020 - EF 50%, diastolic dysfunction   / Cxray - interval improvement in bibasilar opacities  Cardiology consulted  Continue IV lasix pending cardiology input  ECHO pending  Strict I&Os  Daily weights  Cardiac diet   4/29 CHF pathway  Continue Iv diuretics      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  4/29 Continue MVI  Add epogen sq x 1 dose      Diabetes mellitus type 2 in obese  A1c at 5.1 on 03/17/2022 / BG in ED at 112  Accuchecks with SSI prn   ADA, cardiac diet  4/29 Blood sugars running           Stage 4 chronic kidney disease  Creatinine at 1.5 in ED / Baseline appears to be around 1.3-1.8  Monitor renal function labs closely  Avoid nephrotoxins as able  Monitor I&Os  4/29 Monitor  Trend BMP  Renal dose all medications       Gastroesophageal reflux disease  4/29 Continue  home PPI      Atrial fibrillation with chronic anticoagulation with Eliquis  Currently V. Paced - rate controlled  Resumed home medications including eliquis  Continuous cardiac monitoring  EKG prn       CAD (coronary artery disease)  Denies chest pain, EKG unrevealing, troponin negative  Resumed home medications  Continuous cardiac monitoring  EKG prn       Hyperlipemia  4/29 Continue  home statin, omega-3      Hypertension  Currently well-controlled  Home medications resumed  Hydralazine IV prn SBP > 170  VS per unit routine  Monitor closely      VTE Risk Mitigation (From admission, onward)         Ordered     apixaban tablet 2.5 mg  2 times  daily         04/28/22 2001     Place sequential compression device  Until discontinued         04/28/22 2005                Discharge Planning   LITTLE: 4/29/2022     Code Status: Full Code   Is the patient medically ready for discharge?:     Reason for patient still in hospital (select all that apply): Treatment  Discharge Plan A: Home with family        Time spent seeing patient( greater than 1/2 spent in direct contact) :  36 minutes    DOMINGO De Guzman  Department of Hospital Medicine   [facility] - Telemetry ([facility])

## 2022-04-29 NOTE — ASSESSMENT & PLAN NOTE
Creatinine at 1.5 in ED / Baseline appears to be around 1.3-1.8  Monitor renal function labs closely  Avoid nephrotoxins as able  Monitor I&Os

## 2022-04-29 NOTE — SUBJECTIVE & OBJECTIVE
"Interval History: Continue current treatment.     Review of Systems   Constitutional:  Positive for activity change (increased d/t SOB) and fatigue. Negative for appetite change, chills, diaphoresis and fever.   HENT:  Negative for congestion, ear discharge, ear pain, facial swelling, trouble swallowing and voice change.    Eyes:  Negative for pain and redness.   Respiratory:  Positive for shortness of breath. Negative for cough, chest tightness and wheezing.    Cardiovascular:  Positive for leg swelling. Negative for chest pain and palpitations.   Gastrointestinal:  Positive for diarrhea. Negative for abdominal distention, abdominal pain, blood in stool, constipation, nausea and vomiting.        Reports chronic intermittent diarrhea for "months"   Endocrine: Negative for polydipsia and polyphagia.   Genitourinary:  Negative for difficulty urinating, dysuria, flank pain, frequency and urgency.   Musculoskeletal:  Positive for back pain. Negative for neck pain and neck stiffness.        Chronic back pain   Skin:  Negative for color change.   Allergic/Immunologic: Negative for food allergies.   Neurological:  Negative for syncope, facial asymmetry, speech difficulty and weakness.   Hematological:  Does not bruise/bleed easily.   Psychiatric/Behavioral:  Negative for agitation, behavioral problems, confusion, hallucinations and suicidal ideas. The patient is not nervous/anxious.    Objective:     Vital Signs (Most Recent):  Temp: 97.7 °F (36.5 °C) (04/29/22 1554)  Pulse: 60 (04/29/22 1554)  Resp: 20 (04/29/22 1554)  BP: 136/61 (04/29/22 1554)  SpO2: 98 % (04/29/22 1554) Vital Signs (24h Range):  Temp:  [97.5 °F (36.4 °C)-98.1 °F (36.7 °C)] 97.7 °F (36.5 °C)  Pulse:  [60-73] 60  Resp:  [18-21] 20  SpO2:  [95 %-100 %] 98 %  BP: (106-149)/(50-94) 136/61     Weight: 98.9 kg (218 lb)  Body mass index is 32.19 kg/m².    Intake/Output Summary (Last 24 hours) at 4/29/2022 1620  Last data filed at 4/29/2022 1400  Gross per 24 " hour   Intake 720 ml   Output 2 ml   Net 718 ml      Physical Exam  Vitals reviewed.   Constitutional:       General: He is not in acute distress.     Appearance: He is obese. He is not diaphoretic.   HENT:      Head: Normocephalic and atraumatic.      Mouth/Throat:      Mouth: Mucous membranes are moist.   Eyes:      General: No scleral icterus.        Right eye: No discharge.         Left eye: No discharge.      Extraocular Movements: Extraocular movements intact.      Conjunctiva/sclera: Conjunctivae normal.      Pupils: Pupils are equal, round, and reactive to light.   Cardiovascular:      Rate and Rhythm: Normal rate and regular rhythm.      Pulses: Normal pulses.      Heart sounds: Normal heart sounds.   Pulmonary:      Breath sounds: No wheezing or rhonchi.      Comments: BBS diminished    SOB with exertion  Abdominal:      General: Abdomen is flat. Bowel sounds are normal. There is no distension.      Palpations: Abdomen is soft.      Tenderness: There is no abdominal tenderness. There is no guarding.   Genitourinary:     Comments: Not examined  Musculoskeletal:         General: Normal range of motion.      Cervical back: Normal range of motion and neck supple. No rigidity or tenderness.      Right lower leg: Edema present.      Left lower leg: Edema present.      Comments: BLE 1- 2 plus edema   Skin:     General: Skin is warm and dry.      Capillary Refill: Capillary refill takes less than 2 seconds.      Coloration: Skin is not jaundiced.      Findings: No bruising or erythema.   Neurological:      General: No focal deficit present.      Mental Status: He is alert and oriented to person, place, and time. Mental status is at baseline.   Psychiatric:         Mood and Affect: Mood normal.         Behavior: Behavior normal.         Thought Content: Thought content normal.         Judgment: Judgment normal.     Lab Results   Component Value Date    WBC 3.53 (L) 04/29/2022    HGB 8.4 (L) 04/29/2022    HCT 29.4  (L) 04/29/2022     (H) 04/29/2022     04/29/2022       BMP  Lab Results   Component Value Date     04/29/2022    K 3.6 04/29/2022     (H) 04/29/2022    CO2 18 (L) 04/29/2022    BUN 17 04/29/2022    CREATININE 1.4 04/29/2022    CALCIUM 8.4 (L) 04/29/2022    ANIONGAP 11 04/29/2022    ESTGFRAFRICA 55 (A) 04/29/2022    EGFRNONAA 47 (A) 04/29/2022

## 2022-04-29 NOTE — PLAN OF CARE
Pt had an uneventful shift. IV lasix given, potassium replaced. Wife remains at bedside. Room air. Pt is v-paced on the tele monitor. NAD noted. VSS.

## 2022-04-30 VITALS
RESPIRATION RATE: 18 BRPM | HEART RATE: 65 BPM | BODY MASS INDEX: 32.75 KG/M2 | HEIGHT: 69 IN | TEMPERATURE: 98 F | OXYGEN SATURATION: 100 % | WEIGHT: 221.13 LBS | SYSTOLIC BLOOD PRESSURE: 125 MMHG | DIASTOLIC BLOOD PRESSURE: 62 MMHG

## 2022-04-30 LAB
ANION GAP SERPL CALC-SCNC: 9 MMOL/L (ref 8–16)
BASOPHILS # BLD AUTO: 0.04 K/UL (ref 0–0.2)
BASOPHILS NFR BLD: 1.1 % (ref 0–1.9)
BUN SERPL-MCNC: 16 MG/DL (ref 8–23)
CALCIUM SERPL-MCNC: 8.7 MG/DL (ref 8.7–10.5)
CHLORIDE SERPL-SCNC: 115 MMOL/L (ref 95–110)
CO2 SERPL-SCNC: 21 MMOL/L (ref 23–29)
CREAT SERPL-MCNC: 1.4 MG/DL (ref 0.5–1.4)
DIFFERENTIAL METHOD: ABNORMAL
EOSINOPHIL # BLD AUTO: 0.1 K/UL (ref 0–0.5)
EOSINOPHIL NFR BLD: 3 % (ref 0–8)
ERYTHROCYTE [DISTWIDTH] IN BLOOD BY AUTOMATED COUNT: 19.6 % (ref 11.5–14.5)
EST. GFR  (AFRICAN AMERICAN): 55 ML/MIN/1.73 M^2
EST. GFR  (NON AFRICAN AMERICAN): 47 ML/MIN/1.73 M^2
ESTIMATED AVG GLUCOSE: 94 MG/DL (ref 68–131)
GLUCOSE SERPL-MCNC: 86 MG/DL (ref 70–110)
HBA1C MFR BLD: 4.9 % (ref 4–5.6)
HCT VFR BLD AUTO: 31.1 % (ref 40–54)
HGB BLD-MCNC: 8.7 G/DL (ref 14–18)
IMM GRANULOCYTES # BLD AUTO: 0.01 K/UL (ref 0–0.04)
IMM GRANULOCYTES NFR BLD AUTO: 0.3 % (ref 0–0.5)
LYMPHOCYTES # BLD AUTO: 0.7 K/UL (ref 1–4.8)
LYMPHOCYTES NFR BLD: 19.1 % (ref 18–48)
MAGNESIUM SERPL-MCNC: 1.8 MG/DL (ref 1.6–2.6)
MCH RBC QN AUTO: 29.3 PG (ref 27–31)
MCHC RBC AUTO-ENTMCNC: 28 G/DL (ref 32–36)
MCV RBC AUTO: 105 FL (ref 82–98)
MONOCYTES # BLD AUTO: 0.5 K/UL (ref 0.3–1)
MONOCYTES NFR BLD: 13.3 % (ref 4–15)
NEUTROPHILS # BLD AUTO: 2.3 K/UL (ref 1.8–7.7)
NEUTROPHILS NFR BLD: 63.2 % (ref 38–73)
NRBC BLD-RTO: 0 /100 WBC
PLATELET # BLD AUTO: 314 K/UL (ref 150–450)
PMV BLD AUTO: 9.1 FL (ref 9.2–12.9)
POCT GLUCOSE: 138 MG/DL (ref 70–110)
POCT GLUCOSE: 92 MG/DL (ref 70–110)
POTASSIUM SERPL-SCNC: 4.5 MMOL/L (ref 3.5–5.1)
RBC # BLD AUTO: 2.97 M/UL (ref 4.6–6.2)
SODIUM SERPL-SCNC: 145 MMOL/L (ref 136–145)
WBC # BLD AUTO: 3.62 K/UL (ref 3.9–12.7)

## 2022-04-30 PROCEDURE — 99215 OFFICE O/P EST HI 40 MIN: CPT | Mod: ,,, | Performed by: INTERNAL MEDICINE

## 2022-04-30 PROCEDURE — 25000003 PHARM REV CODE 250: Performed by: STUDENT IN AN ORGANIZED HEALTH CARE EDUCATION/TRAINING PROGRAM

## 2022-04-30 PROCEDURE — 80048 BASIC METABOLIC PNL TOTAL CA: CPT | Performed by: NURSE PRACTITIONER

## 2022-04-30 PROCEDURE — 25000003 PHARM REV CODE 250: Performed by: NURSE PRACTITIONER

## 2022-04-30 PROCEDURE — 94761 N-INVAS EAR/PLS OXIMETRY MLT: CPT

## 2022-04-30 PROCEDURE — 63600175 PHARM REV CODE 636 W HCPCS: Performed by: NURSE PRACTITIONER

## 2022-04-30 PROCEDURE — G0378 HOSPITAL OBSERVATION PER HR: HCPCS

## 2022-04-30 PROCEDURE — 96376 TX/PRO/DX INJ SAME DRUG ADON: CPT

## 2022-04-30 PROCEDURE — 36415 COLL VENOUS BLD VENIPUNCTURE: CPT | Performed by: NURSE PRACTITIONER

## 2022-04-30 PROCEDURE — 96375 TX/PRO/DX INJ NEW DRUG ADDON: CPT

## 2022-04-30 PROCEDURE — 83735 ASSAY OF MAGNESIUM: CPT | Performed by: NURSE PRACTITIONER

## 2022-04-30 PROCEDURE — 99215 PR OFFICE/OUTPT VISIT, EST, LEVL V, 40-54 MIN: ICD-10-PCS | Mod: ,,, | Performed by: INTERNAL MEDICINE

## 2022-04-30 PROCEDURE — 25000242 PHARM REV CODE 250 ALT 637 W/ HCPCS: Performed by: NURSE PRACTITIONER

## 2022-04-30 PROCEDURE — 85025 COMPLETE CBC W/AUTO DIFF WBC: CPT | Performed by: NURSE PRACTITIONER

## 2022-04-30 RX ORDER — LANOLIN ALCOHOL/MO/W.PET/CERES
400 CREAM (GRAM) TOPICAL EVERY OTHER DAY
Status: DISCONTINUED | OUTPATIENT
Start: 2022-04-30 | End: 2022-04-30

## 2022-04-30 RX ORDER — MAGNESIUM SULFATE HEPTAHYDRATE 40 MG/ML
2 INJECTION, SOLUTION INTRAVENOUS ONCE
Status: COMPLETED | OUTPATIENT
Start: 2022-04-30 | End: 2022-04-30

## 2022-04-30 RX ORDER — LANOLIN ALCOHOL/MO/W.PET/CERES
400 CREAM (GRAM) TOPICAL EVERY OTHER DAY
Status: DISCONTINUED | OUTPATIENT
Start: 2022-05-01 | End: 2022-04-30 | Stop reason: HOSPADM

## 2022-04-30 RX ORDER — ACETAMINOPHEN 500 MG
1000 TABLET ORAL EVERY 6 HOURS PRN
Refills: 0
Start: 2022-04-30

## 2022-04-30 RX ORDER — SODIUM CHLORIDE 0.9 % (FLUSH) 0.9 %
3 SYRINGE (ML) INJECTION
Status: DISCONTINUED | OUTPATIENT
Start: 2022-04-30 | End: 2022-04-30 | Stop reason: HOSPADM

## 2022-04-30 RX ORDER — SODIUM CHLORIDE 9 MG/ML
INJECTION, SOLUTION INTRAVENOUS
Status: DISCONTINUED | OUTPATIENT
Start: 2022-04-30 | End: 2022-04-30 | Stop reason: HOSPADM

## 2022-04-30 RX ADMIN — FUROSEMIDE 80 MG: 10 INJECTION INTRAMUSCULAR; INTRAVENOUS at 06:04

## 2022-04-30 RX ADMIN — SODIUM CHLORIDE: 0.9 INJECTION, SOLUTION INTRAVENOUS at 10:04

## 2022-04-30 RX ADMIN — ASPIRIN 81 MG CHEWABLE TABLET 81 MG: 81 TABLET CHEWABLE at 09:04

## 2022-04-30 RX ADMIN — SPIRONOLACTONE 25 MG: 25 TABLET ORAL at 09:04

## 2022-04-30 RX ADMIN — MAGNESIUM SULFATE HEPTAHYDRATE 2 G: 2 INJECTION, SOLUTION INTRAVENOUS at 10:04

## 2022-04-30 RX ADMIN — APIXABAN 2.5 MG: 2.5 TABLET, FILM COATED ORAL at 09:04

## 2022-04-30 RX ADMIN — POTASSIUM CHLORIDE 20 MEQ: 1500 TABLET, EXTENDED RELEASE ORAL at 09:04

## 2022-04-30 RX ADMIN — OMEGA-3-ACID ETHYL ESTERS 1 G: 1 CAPSULE, LIQUID FILLED ORAL at 09:04

## 2022-04-30 RX ADMIN — Medication 1000 UNITS: at 09:04

## 2022-04-30 RX ADMIN — THERA TABS 1 TABLET: TAB at 09:04

## 2022-04-30 RX ADMIN — LOSARTAN POTASSIUM 50 MG: 50 TABLET, FILM COATED ORAL at 09:04

## 2022-04-30 RX ADMIN — PANTOPRAZOLE SODIUM 40 MG: 40 TABLET, DELAYED RELEASE ORAL at 09:04

## 2022-04-30 RX ADMIN — FLUTICASONE PROPIONATE 100 MCG: 50 SPRAY, METERED NASAL at 09:04

## 2022-04-30 NOTE — DISCHARGE SUMMARY
[facility] - Telemetry ([facility])  Hospital Medicine  Discharge Summary    Patient Name: Jake Ortiz  MRN: [id number]  Patient Class: OP- Observation  Admission Date: 4/28/2022  Hospital Length of Stay: 0 days  Discharge Date and Time:  04/30/2022 4:19 PM  Attending Physician: No att. providers found   Discharging Provider: DOMINGO De Guzman  Primary Care Provider: Byron Stevens MD    HPI:   80 y/o WM with hx of pacemaker, diastolic HF, HTN, HLD, CAD, MI, CABG x3, DDD, psoriasis, prostate CA, Afib, cardiomegaly, restrictive lung disease, GERD, NASREEN, anemia, back pain, CKD4, DM2, tonsillectomy sent to ED from cardiology clinic with c/o gradually increasing SOB (worst on exertion), weight gain and BLE edema over the previous week. Symptoms are progressive and of moderate severity, without known mitigating factors. Denies chest pain, palpitations, wheezing, abdominal pain, N/V/D, constipation, dysuria, flank pain, HA, dizziness, weakness, fever, cough, chills, falls/trauma, blurred vision, focal deficits. Vital signs stable in ED - pt was afebrile; WBCs 3.95, H&H 8.8&30.5, platelets 336, Na 142, K+ 3.8, BUN 19, creatinine 1.5, GFR 44, , , CPK 97, troponin negative; COVID-19 negative, UA uninfected, Cxray showed interval improvement in bibasilar opacities; EKG showed Vpaced rhythm (62 BPM). In ED the pt was given 1g tylenol and 60mg IV lasix with good urine output - pt remained stable throughout. Hospital medicine was called and the pt was placed in observation with telemetry monitoring. Pt is a full code - surrogate decision maker is his wife, Xuan Ortiz.     * No surgery found *      Hospital Course:   The patient was monitored closely during his stay. He was kept on continuous telemetry monitoring. Patient was placed on iv diuretics for his CHF and bilateral pleural effusions. Cardiology  was consulted. An echocardiogram was ordered. CHF pathway was initiated. Patient was given a dose of sq epogen for  his anemia of CKD. His MVI was continued. Patient's overall condition remained stable and improved and he was discharged to home. Patient was to follow up with Cardiology as Outpatient. Patient declined Home Health.     Goals of Care Treatment Preferences:  Code Status: Full Code      Consults:   Consults (From admission, onward)        Status Ordering Provider     Inpatient consult to Social Work/Case Management             Completed KEILA LEAL     Inpatient consult to Registered Dietitian/Nutritionist          Completed KEILA LEAL     Inpatient consult to Cardiology  Once        Provider:  Dr. Hood    Completed EVERT DOS SANTOS        Service: [facility] Medicine    Final Active Diagnoses:    Diagnosis Date Noted POA    PRINCIPAL PROBLEM:  Acute on chronic diastolic congestive heart failure [I50.33] 03/05/2018 Yes    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 [N18.4, D63.1] 04/29/2022 Yes    Diabetes mellitus type 2 in obese [E11.69, E66.9] 01/05/2022 Yes    Stage 4 chronic kidney disease [N18.4] 11/04/2020 Yes    Gastroesophageal reflux disease [K21.9] 03/05/2018 Yes    Atrial fibrillation with chronic anticoagulation with Eliquis [I48.91] 10/19/2017 Yes    CAD (coronary artery disease) [I25.10] 07/23/2014 Yes    Hypertension [I10]  Yes    Hyperlipemia [E78.5]  Yes      Problems Resolved During this Admission:       Discharged Condition: stable    Disposition: Home or Self Care    Follow Up:   Follow-up Information     cardiology Follow up in 2 week(s).                     Patient Instructions:      Diet diabetic   Order Comments: 2000 ADA 1500 ml fluid restriction diet     Call MD for:  temperature >100.4   Standing Status:      Call MD for:  persistent nausea and vomiting or diarrhea   Standing Status:      Call MD for:  severe uncontrolled pain   Standing Status:      Call MD for:  redness, tenderness, or signs of infection (pain, swelling, redness, odor or green/yellow discharge around  incision site)   Standing Status:      Call MD for:  difficulty breathing or increased cough   Standing Status:      Call MD for:  severe persistent headache   Standing Status:      Call MD for:  worsening rash   Standing Status:      Call MD for:  persistent dizziness, light-headedness, or visual disturbances   Standing Status:      Call MD for:  increased confusion or weakness   Standing Status:      Activity as tolerated       Significant Diagnostic Studies:   CMP   Recent Labs   Lab 04/29/22  0351 04/30/22  0646    145   K 3.6 4.5   * 115*   CO2 18* 21*   GLU 97 86   BUN 17 16   CREATININE 1.4 1.4   CALCIUM 8.4* 8.7   PROT 5.8*  --    ALBUMIN 3.3*  --    BILITOT 0.5  --    ALKPHOS 81  --    AST 17  --    ALT 12  --    ANIONGAP 11 9   ESTGFRAFRICA 55* 55*   EGFRNONAA 47* 47*   , CBC   Recent Labs   Lab 04/29/22  0351 04/30/22  0646   WBC 3.53* 3.62*   HGB 8.4* 8.7*   HCT 29.4* 31.1*    314   Troponin   Recent Labs   Lab 04/26/22  1550 04/28/22  1600   TROPONINI 0.015 0.019    and A1C:   Recent Labs   Lab 03/17/22  1133 04/29/22  1710   HGBA1C 5.1 4.9       Radiology Results (last 7 days)    Procedure Component Value Units Date/Time   X-Ray Chest AP Portable [[id number]] Resulted: 04/28/22 1537   Order Status: Completed Updated: 04/28/22 1540   Narrative:     EXAMINATION:   XR CHEST AP PORTABLE     CLINICAL HISTORY:   sob;     TECHNIQUE:   Single frontal view of the chest was performed.     COMPARISON:   Chest radiograph 04/26/2022.     FINDINGS:   Unchanged appearance of the left-sided pacing device and leads.  Low lung volumes and interval improvement in the bibasilar opacities, mild residual.  No effusion or pneumothorax.  Unchanged cardiomediastinal silhouette.  No free air under the diaphragm.  No acute osseous abnormality.    Impression:       Interval improvement when compared to radiograph from 04/26/2022, as above.       Electronically signed by: Jer Le   Date: 04/28/2022   Time:  "15:37       Transthoracic echo (TTE) complete  Order# [id number]  Reading physician: Phillip Hood MD Ordering physician: Tasha Griffiths NP Study date: 4/29/22       Reason for Exam  Priority: ASAP  Dx: CHF (congestive heart failure) [I50.9 (ICD-10-CM)]           View Images Vital Vitrea     Show images for Echo    Summary    · The left ventricle is normal in size with concentric hypertrophy and moderately decreased systolic function.  · The estimated ejection fraction is 35%.  · Grade III left ventricular diastolic dysfunction.  · Mild right ventricular enlargement with moderately reduced right ventricular systolic function.  · Mild left atrial enlargement.  · Mild right atrial enlargement.  · There is mild aortic valve stenosis.  · Aortic valve area is 1.53 cm2; peak velocity is 1.78 m/s; mean gradient is 7 mmHg.  · Mild mitral regurgitation.  · Mild tricuspid regurgitation.  · Elevated central venous pressure (15 mmHg).  · The estimated PA systolic pressure is 37 mmHg.  · There is abnormal septal wall motion. There is systolic and diastolic flattening of the interventricular septum consistent with right ventricle pressure and volume overload.  · There is moderate left ventricular global hypokinesis.       Vitals    Height Weight BMI (Calculated) BSA (Calculated - sq m) BP Pulse   5' 9" (1.753 m) 98.9 kg (218 lb) 32.2 2.19 sq meters 125/61 61       Study Details    A complete echo was performed using complete 2D, color flow Doppler and spectral Doppler. During the study, the apical, parasternal and subcostal views were captured.  Overall the study quality was technically difficult. The study was difficult due to patient's body habitus and poor endocardial visualization. This was a portable study performed at the patient's bedside.       Echocardiography Findings      Left Ventricle    The left ventricle is normal in size with moderately decreased systolic function. The estimated ejection fraction is 35%. There " is abnormal septal motion. There is systolic and diastolic flattening of the interventricular septum consistent with right ventricle pressure and volume overload. There is grade III diastolic dysfunction consistent with restrictive physiology. Left atrial pressure is elevated. There is moderate global hypokinesis.     Right Ventricle    The right ventricle is mildly dilated. Systolic function is moderately reduced. There is a lead/wire present in the ventricle.     Left Atrium    There is mild left atrial enlargement.     Right Atrium    The right atrium is mildly enlarged.     Aortic Valve    Aortic valve sclerosis is mild. There is normal leaflet mobility. There is mild stenosis. CHRISTIAN is 1.53 cm2; mean gradient is 7 mmHg; peak gradient is 13 mmHg; AV peak velocity is 1.78 m/s. The LVOT diameter is 1.87 cm.     Mitral Valve    The mitral valve appears structurally normal. There is normal leaflet mobility.  There is mild mitral valve regurgitation.     Tricuspid Valve    The tricuspid valve appears structurally normal. There is normal leaflet mobility. There is mild regurgitation. The estimated PA systolic pressure is greater than 22 mmHg.     Pulmonic Valve    Pulmonic valve is not well visualized due to poor sonic window.     IVC/SVC    Elevated central venous pressure (15 mm Hg).     Ascending Aorta    The aortic root and ascending aorta are normal in size.     Pericardium and Other Findings    Pericardium is normal. There is no pericardial effusion.                           Exam Details    Performed Procedure Technologist     Transthoracic echo (TTE) complete Christy Mann RDCS           Begin Exam End Exam     4/29/2022  7:05 AM 4/29/2022  8:36 AM                2D Measurements    Dimensions   LVIDd 5.09 cm         LVIDs 3.53 cm         IVS 1.1 cm         Posterior Wall 1.72 cm Important          FS 31 %         LA size 5.19 cm         LV mass 302.65 g         Echo EF Estimated 58 %          Dimensions    RVDD 3.47 cm         TAPSE 0.76 cm         RA Width 5.24 cm         RA Major Axis 6.21 cm          Aortic Root - End Diastolic   Ao root annulus 2.73 cm         Sinus 2.84 cm         STJ 2.87 cm         Ascending aorta 2.53 cm          Inferior Vena Cava   IVC diameter 2.76 cm                 Doppler Measurements - Diastolic Filling    Diastolic Filling   E/A ratio 3.17          IVRT [id number] msec         Mean e' 0.11 m/s          E wave deceleration time [id number] msec         E/E' ratio 10.57 m/s                Doppler Measurements - Aortic Valve    Stenosis   LVOT diameter 1.87 cm         LVOT area 2.7 cm2         LVOT peak wilian 1.08 m/s         LVOT peak VTI 20.5 cm         Ao peak wilian 1.78 m/s         Ao VTI 36.8 cm         AV valve area 1.53 cm2         AV mean gradient 7 mmHg         AV peak gradient 13 mmHg         AV Velocity Ratio 0.61          AV index (prosthetic) 0.56                    Doppler Measurements - Mitral Valve    PISA-MS   MV Peak E Wilian 1.11 m/s          PISA-MR   Mr max wilian 4.34 m/s                   Doppler Measurements - Pulmonic Valve    Stenosis   RVOT peak wilian 0.47 m/s         RVOT peak VTI 12.8 cm         PV mean gradient 0.59 mmHg                 Doppler Measurements - Shunt Ratio    Shunt Ratio   LVOT stroke volume 56.27 cm3                Doppler Measurements - Tricuspid Valve    Stress Evaluation   TV rest pulmonary artery pressure 37 mmHg                   Medications  (Filter: Administrations occurring from 0000 to 0836 on 04/29/22)  None         Signed    Electronically signed by Phillip Hood MD on 4/29/22 at 1103 CDT       Pending Diagnostic Studies:     None         Medications:  Reconciled Home Medications:      Medication List      START taking these medications    acetaminophen 500 MG tablet  Commonly known as: TYLENOL  Take 2 tablets (1,000 mg total) by mouth every 6 (six) hours as needed for Pain.  Replaces: acetaminophen 650 MG Tbsr        CHANGE  how you take these medications    atorvastatin 80 MG tablet  Commonly known as: LIPITOR  One tablet daily  What changed:   · how much to take  · how to take this  · when to take this  · additional instructions        CONTINUE taking these medications    apixaban 2.5 mg Tab  Commonly known as: ELIQUIS  Take 1 tablet (2.5 mg total) by mouth 2 (two) times daily.     aspirin 81 MG Chew  Take 1 tablet (81 mg total) by mouth once daily.     blood sugar diagnostic Strp  Check blood glucose levels daily in the AM fasting and 1-2 times more daily.     cholecalciferol (vitamin D3) 25 mcg (1,000 unit) capsule  Commonly known as: VITAMIN D3  Take 1,000 Units by mouth once daily.     clonazePAM 1 MG tablet  Commonly known as: KlonoPIN  Take 1 tablet (1 mg total) by mouth nightly as needed for Anxiety.     fluticasone propionate 50 mcg/actuation nasal spray  Commonly known as: FLONASE  2 sprays (100 mcg total) by Each Nostril route once daily.     furosemide 40 MG tablet  Commonly known as: LASIX  Take 1 tablet (40 mg total) by mouth 2 (two) times daily. Can double to 2 tablets twice a day for 3 days for more swelling/fluid build up - take 80mg twice a day     lancets Misc  Check blood glucose levels daily in the AM fasting and 1-2 times more daily.     levocetirizine 5 MG tablet  Commonly known as: XYZAL  TAKE 1 TABLET EVERY EVENING     losartan 50 MG tablet  Commonly known as: COZAAR  Take 1 tablet (50 mg total) by mouth once daily.     magnesium oxide 400 mg (241.3 mg magnesium) tablet  Commonly known as: MAG-OX  Take 1 tablet (400 mg total) by mouth once daily.     MEGARED OMEGA-3 KRILL OIL ORAL  Take 1 capsule by mouth every morning.     multivitamin capsule  Take 1 capsule by mouth once daily.     OCUVITE PRESERVISION ORAL  Take 1 capsule by mouth every evening.     pantoprazole 40 MG tablet  Commonly known as: PROTONIX  Take 1 tablet (40 mg total) by mouth once daily.     potassium chloride SA 20 MEQ tablet  Commonly known  as: K-DUR,KLOR-CON  Take 1 tablet (20 mEq total) by mouth once daily.     spironolactone 25 MG tablet  Commonly known as: ALDACTONE  Take 1 tablet (25 mg total) by mouth once daily.     traMADoL 50 mg tablet  Commonly known as: ULTRAM  Take 12.5 mg by mouth every 6 (six) hours as needed for Pain.        STOP taking these medications    acetaminophen 650 MG Tbsr  Commonly known as: TYLENOL  Replaced by: acetaminophen 500 MG tablet            Indwelling Lines/Drains at time of discharge:   Lines/Drains/Airways     None                 Time spent on the discharge of patient: 64  minutes         DOMINGO De Guzman  Department of Hospital Medicine  [facility] - Telemetry ([facility])

## 2022-04-30 NOTE — SUBJECTIVE & OBJECTIVE
Interval History: improved symptoms, stable back to baseline    Review of Systems   Constitutional: Positive for malaise/fatigue and weight gain.   HENT: Negative.     Eyes: Negative.    Cardiovascular:  Positive for dyspnea on exertion and leg swelling.   Respiratory:  Positive for shortness of breath.    Endocrine: Negative.    Hematologic/Lymphatic: Negative.    Gastrointestinal: Negative.    Genitourinary: Negative.    Neurological: Negative.    Objective:     Vital Signs (Most Recent):  Temp: 97.5 °F (36.4 °C) (04/30/22 1157)  Pulse: 65 (04/30/22 1157)  Resp: 18 (04/30/22 1157)  BP: 125/62 (04/30/22 1157)  SpO2: 100 % (04/30/22 1157) Vital Signs (24h Range):  Temp:  [96.7 °F (35.9 °C)-97.7 °F (36.5 °C)] 97.5 °F (36.4 °C)  Pulse:  [58-76] 65  Resp:  [18-20] 18  SpO2:  [96 %-100 %] 100 %  BP: (125-139)/(61-65) 125/62     Weight: 100.3 kg (221 lb 1.9 oz)  Body mass index is 32.65 kg/m².     SpO2: 100 %  O2 Device (Oxygen Therapy): room air      Intake/Output Summary (Last 24 hours) at 4/30/2022 1421  Last data filed at 4/30/2022 0900  Gross per 24 hour   Intake 680 ml   Output 875 ml   Net -195 ml       Lines/Drains/Airways       None                   Physical Exam  Vitals and nursing note reviewed.   Constitutional:       General: He is not in acute distress.     Appearance: He is well-developed. He is not diaphoretic.   HENT:      Head: Normocephalic and atraumatic.      Nose: Nose normal.   Eyes:      General: No scleral icterus.     Conjunctiva/sclera: Conjunctivae normal.   Neck:      Thyroid: No thyromegaly.      Vascular: No JVD.   Cardiovascular:      Rate and Rhythm: Normal rate and regular rhythm.      Heart sounds: S1 normal and S2 normal. Murmur heard.     No friction rub. No gallop. No S3 or S4 sounds.   Pulmonary:      Effort: Pulmonary effort is normal. No respiratory distress.      Breath sounds: Normal breath sounds. No stridor. No wheezing or rales.   Chest:      Chest wall: No tenderness.    Abdominal:      General: Bowel sounds are normal. There is no distension.      Palpations: Abdomen is soft. There is no mass.      Tenderness: There is no abdominal tenderness. There is no rebound.   Genitourinary:     Comments: Deferred  Musculoskeletal:         General: No tenderness or deformity. Normal range of motion.      Cervical back: Normal range of motion and neck supple.   Lymphadenopathy:      Cervical: No cervical adenopathy.   Skin:     General: Skin is warm and dry.      Coloration: Skin is not pale.      Findings: No erythema or rash.   Neurological:      Mental Status: He is alert and oriented to person, place, and time.      Motor: No abnormal muscle tone.      Coordination: Coordination normal.   Psychiatric:         Behavior: Behavior normal.         Thought Content: Thought content normal.         Judgment: Judgment normal.       Significant Labs: All pertinent lab results from the last 24 hours have been reviewed.    Significant Imaging: Echocardiogram: Transthoracic echo (TTE) complete (Cupid Only):   Results for orders placed or performed during the hospital encounter of 04/28/22   Echo   Result Value Ref Range    BSA 2.19 m2    TDI SEPTAL 0.09 m/s    LV LATERAL E/E' RATIO 9.25 m/s    LV SEPTAL E/E' RATIO 12.33 m/s    IVC diameter 2.76 cm    Left Ventricular Outflow Tract Mean Velocity 0.51239633680900 cm/s    Left Ventricular Outflow Tract Mean Gradient 2.51 mmHg    TDI LATERAL 0.12 m/s    LVIDd 5.09 3.5 - 6.0 cm    IVS 1.10 0.6 - 1.1 cm    Posterior Wall 1.72 (A) 0.6 - 1.1 cm    Ao root annulus 2.73 cm    LVIDs 3.53 2.1 - 4.0 cm    FS 31 28 - 44 %    Sinus 2.84 cm    STJ 2.87 cm    Ascending aorta 2.53 cm    LV mass 302.65 g    LA size 5.19 cm    RVDD 3.47 cm    TAPSE 0.76 cm    Left Ventricle Relative Wall Thickness 0.68 cm    AV mean gradient 7 mmHg    AV valve area 1.53 cm2    AV Velocity Ratio 0.61     AV index (prosthetic) 0.56     E/A ratio 3.17     Mean e' 0.11 m/s    E wave  deceleration time 209.195505812521353 msec    IVRT 34.872173008327475 msec    LVOT diameter 1.87 cm    LVOT area 2.7 cm2    LVOT peak wilian 1.08 m/s    LVOT peak VTI 20.50 cm    Ao peak wilian 1.78 m/s    Ao VTI 36.8 cm    RVOT peak wilian 0.47 m/s    RVOT peak VTI 12.8 cm    Mr max wilian 4.34 m/s    LVOT stroke volume 56.27 cm3    AV peak gradient 13 mmHg    PV mean gradient 0.59 mmHg    E/E' ratio 10.57 m/s    MV Peak E Wilian 1.11 m/s    TR Max Wilian 2.34 m/s    MV Peak A Wilian 0.35 m/s    LV Systolic Volume 51.80 mL    LV Systolic Volume Index 24.2 mL/m2    LV Diastolic Volume 123.09 mL    LV Diastolic Volume Index 57.52 mL/m2    LV Mass Index 141 g/m2    Echo EF Estimated 58 %    RA Major Axis 6.21 cm    Left Atrium Minor Axis 7.25 cm    Left Atrium Major Axis 7.07 cm    Triscuspid Valve Regurgitation Peak Gradient 22 mmHg    RA Width 5.24 cm    Right Atrial Pressure (from IVC) 15 mmHg    EF 35 %    TV rest pulmonary artery pressure 37 mmHg    Narrative    · The left ventricle is normal in size with concentric hypertrophy and   moderately decreased systolic function.  · The estimated ejection fraction is 35%.  · Grade III left ventricular diastolic dysfunction.  · Mild right ventricular enlargement with moderately reduced right   ventricular systolic function.  · Mild left atrial enlargement.  · Mild right atrial enlargement.  · There is mild aortic valve stenosis.  · Aortic valve area is 1.53 cm2; peak velocity is 1.78 m/s; mean gradient   is 7 mmHg.  · Mild mitral regurgitation.  · Mild tricuspid regurgitation.  · Elevated central venous pressure (15 mmHg).  · The estimated PA systolic pressure is 37 mmHg.  · There is abnormal septal wall motion. There is systolic and diastolic   flattening of the interventricular septum consistent with right ventricle   pressure and volume overload.  · There is moderate left ventricular global hypokinesis.

## 2022-04-30 NOTE — PLAN OF CARE
Pt AAOx4. VSS. Pt remained free of falls this shift. No complaints of pain or discomfort. Medications administered as ordered. Administered IV Lasix this AM. Pt is v-paced on monitor. Hourly rounding completed. Pt instructed to call for assistance. POC reviewed. Pt verbalized understanding.

## 2022-04-30 NOTE — CONSULTS
Food & Nutrition  Education    Diet Education: Fluid and Salt restriction diet   Learners: patient       Nutrition Education provided with handouts:   1.) Low Salt diet     Comments:  Admits acute on chronic diastolic congestive heart failure. RD covering remotely. Unable to reach patient via telehealth. Attached Low sodium diet restrictions to pt chart for after discharge. Onsite RD to f/u with education on 5/2.

## 2022-04-30 NOTE — HOSPITAL COURSE
Mr. 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 Patient complained of increased SOB associated with BLE edema and weight gain over the past few days. He denied any associated allison chest pain, fever, chills, palpitations, near syncope, or syncope. Initial workup in ED revealed BNP of 615 and anemia (H/H 8.4/29.4) and patient was subsequently admitted for further evaluation and treatment. Cardiology consulted to assist with management. Patient seen and examined today, resting in bed. Feeling better, states SOB and BLE edema have improved. No allison chest pain. I's/O's not charted correctly as patient accidentally spilled his urinal. He reports compliance with his medications. Followed routinely on OP basis by Dr. Hood. Admits to recent dietary indiscretion-ate crawfish. Echo pending.    Results for orders placed during the hospital encounter of 04/28/22    Echo    Interpretation Summary  · The left ventricle is normal in size with concentric hypertrophy and moderately decreased systolic function.  · The estimated ejection fraction is 35%.  · Grade III left ventricular diastolic dysfunction.  · Mild right ventricular enlargement with moderately reduced right ventricular systolic function.  · Mild left atrial enlargement.  · Mild right atrial enlargement.  · There is mild aortic valve stenosis.  · Aortic valve area is 1.53 cm2; peak velocity is 1.78 m/s; mean gradient is 7 mmHg.  · Mild mitral regurgitation.  · Mild tricuspid regurgitation.  · Elevated central venous pressure (15 mmHg).  · The estimated PA systolic pressure is 37 mmHg.  · There is abnormal septal wall motion. There is systolic and diastolic flattening of the interventricular septum  consistent with right ventricle pressure and volume overload.  · There is moderate left ventricular global hypokinesis.        4/30/22 - PT had diuresed well o/n, unsure I/O but pt feels much better symptomatically. Stable for DC and f/u in CV clinic and CHF clinic; had CHF education by nursing yesterday. ECHO with dec EF from prior, will optimize meds

## 2022-04-30 NOTE — ASSESSMENT & PLAN NOTE
-Presents with decompensated diastolic CHF, in part due to dietary indiscretion  -Continue IV diuresis  -Continue ARB, Aldactone  -Strict I's/O's  -Echo pending  -Patient counseled on dietary restrictions    4/30/22 - diuresed well, stable for DC - Lasix 40 mg BID and f/u in CHF clinic; ECHO with dec EF will titrate meds - could not afford Entresto in past but may benefit from reapplying for assistance

## 2022-04-30 NOTE — CONSULTS
D/C planning, D/C assessment and Final note completed per ERWIN Sarabia RN.    Farida Carias LMSW 4/30/2022 8:28 AM

## 2022-04-30 NOTE — PROGRESS NOTES
[facility])  Cardiology  Progress Note    Patient Name: Jake Ortiz  MRN: [id number]  Admission Date: 4/28/2022  Hospital Length of Stay: 0 days  Code Status: Full Code   Attending Physician: Lulú Barnes MD   Primary Care Physician: Byron Stevens MD  Expected Discharge Date: 4/30/2022  Principal Problem:Acute on chronic diastolic congestive heart failure    Subjective:     Hospital Course:   Mr. 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 Patient complained of increased SOB associated with BLE edema and weight gain over the past few days. He denied any associated allison chest pain, fever, chills, palpitations, near syncope, or syncope. Initial workup in ED revealed BNP of 615 and anemia (H/H 8.4/29.4) and patient was subsequently admitted for further evaluation and treatment. Cardiology consulted to assist with management. Patient seen and examined today, resting in bed. Feeling better, states SOB and BLE edema have improved. No allison chest pain. I's/O's not charted correctly as patient accidentally spilled his urinal. He reports compliance with his medications. Followed routinely on OP basis by Dr. Hood. Admits to recent dietary indiscretion-ate crawfish. Echo pending.    Results for orders placed during the hospital encounter of 04/28/22    Echo    Interpretation Summary  · The left ventricle is normal in size with concentric hypertrophy and moderately decreased systolic function.  · The estimated ejection fraction is 35%.  · Grade III left ventricular diastolic dysfunction.  · Mild right ventricular enlargement with moderately reduced right ventricular systolic function.  · Mild left atrial enlargement.  · Mild right atrial  enlargement.  · There is mild aortic valve stenosis.  · Aortic valve area is 1.53 cm2; peak velocity is 1.78 m/s; mean gradient is 7 mmHg.  · Mild mitral regurgitation.  · Mild tricuspid regurgitation.  · Elevated central venous pressure (15 mmHg).  · The estimated PA systolic pressure is 37 mmHg.  · There is abnormal septal wall motion. There is systolic and diastolic flattening of the interventricular septum consistent with right ventricle pressure and volume overload.  · There is moderate left ventricular global hypokinesis.        4/30/22 - PT had diuresed well o/n, unsure I/O but pt feels much better symptomatically. Stable for DC and f/u in CV clinic and CHF clinic; had CHF education by nursing yesterday. ECHO with dec EF from prior, will optimize meds      Interval History: improved symptoms, stable back to baseline    Review of Systems   Constitutional: Positive for malaise/fatigue and weight gain.   HENT: Negative.     Eyes: Negative.    Cardiovascular:  Positive for dyspnea on exertion and leg swelling.   Respiratory:  Positive for shortness of breath.    Endocrine: Negative.    Hematologic/Lymphatic: Negative.    Gastrointestinal: Negative.    Genitourinary: Negative.    Neurological: Negative.    Objective:     Vital Signs (Most Recent):  Temp: 97.5 °F (36.4 °C) (04/30/22 1157)  Pulse: 65 (04/30/22 1157)  Resp: 18 (04/30/22 1157)  BP: 125/62 (04/30/22 1157)  SpO2: 100 % (04/30/22 1157) Vital Signs (24h Range):  Temp:  [96.7 °F (35.9 °C)-97.7 °F (36.5 °C)] 97.5 °F (36.4 °C)  Pulse:  [58-76] 65  Resp:  [18-20] 18  SpO2:  [96 %-100 %] 100 %  BP: (125-139)/(61-65) 125/62     Weight: 100.3 kg (221 lb 1.9 oz)  Body mass index is 32.65 kg/m².     SpO2: 100 %  O2 Device (Oxygen Therapy): room air      Intake/Output Summary (Last 24 hours) at 4/30/2022 1421  Last data filed at 4/30/2022 0900  Gross per 24 hour   Intake 680 ml   Output 875 ml   Net -195 ml       Lines/Drains/Airways       None                    Physical Exam  Vitals and nursing note reviewed.   Constitutional:       General: He is not in acute distress.     Appearance: He is well-developed. He is not diaphoretic.   HENT:      Head: Normocephalic and atraumatic.      Nose: Nose normal.   Eyes:      General: No scleral icterus.     Conjunctiva/sclera: Conjunctivae normal.   Neck:      Thyroid: No thyromegaly.      Vascular: No JVD.   Cardiovascular:      Rate and Rhythm: Normal rate and regular rhythm.      Heart sounds: S1 normal and S2 normal. Murmur heard.     No friction rub. No gallop. No S3 or S4 sounds.   Pulmonary:      Effort: Pulmonary effort is normal. No respiratory distress.      Breath sounds: Normal breath sounds. No stridor. No wheezing or rales.   Chest:      Chest wall: No tenderness.   Abdominal:      General: Bowel sounds are normal. There is no distension.      Palpations: Abdomen is soft. There is no mass.      Tenderness: There is no abdominal tenderness. There is no rebound.   Genitourinary:     Comments: Deferred  Musculoskeletal:         General: No tenderness or deformity. Normal range of motion.      Cervical back: Normal range of motion and neck supple.   Lymphadenopathy:      Cervical: No cervical adenopathy.   Skin:     General: Skin is warm and dry.      Coloration: Skin is not pale.      Findings: No erythema or rash.   Neurological:      Mental Status: He is alert and oriented to person, place, and time.      Motor: No abnormal muscle tone.      Coordination: Coordination normal.   Psychiatric:         Behavior: Behavior normal.         Thought Content: Thought content normal.         Judgment: Judgment normal.       Significant Labs: All pertinent lab results from the last 24 hours have been reviewed.    Significant Imaging: Echocardiogram: Transthoracic echo (TTE) complete (Cupid Only):   Results for orders placed or performed during the hospital encounter of 04/28/22   Echo   Result Value Ref Range    BSA 2.19 m2     TDI SEPTAL 0.09 m/s    LV LATERAL E/E' RATIO 9.25 m/s    LV SEPTAL E/E' RATIO 12.33 m/s    IVC diameter 2.76 cm    Left Ventricular Outflow Tract Mean Velocity 0.71716831303568 cm/s    Left Ventricular Outflow Tract Mean Gradient 2.51 mmHg    TDI LATERAL 0.12 m/s    LVIDd 5.09 3.5 - 6.0 cm    IVS 1.10 0.6 - 1.1 cm    Posterior Wall 1.72 (A) 0.6 - 1.1 cm    Ao root annulus 2.73 cm    LVIDs 3.53 2.1 - 4.0 cm    FS 31 28 - 44 %    Sinus 2.84 cm    STJ 2.87 cm    Ascending aorta 2.53 cm    LV mass 302.65 g    LA size 5.19 cm    RVDD 3.47 cm    TAPSE 0.76 cm    Left Ventricle Relative Wall Thickness 0.68 cm    AV mean gradient 7 mmHg    AV valve area 1.53 cm2    AV Velocity Ratio 0.61     AV index (prosthetic) 0.56     E/A ratio 3.17     Mean e' 0.11 m/s    E wave deceleration time [id number] msec    IVRT 34.997910530173772 msec    LVOT diameter 1.87 cm    LVOT area 2.7 cm2    LVOT peak wilian 1.08 m/s    LVOT peak VTI 20.50 cm    Ao peak wilian 1.78 m/s    Ao VTI 36.8 cm    RVOT peak wilian 0.47 m/s    RVOT peak VTI 12.8 cm    Mr max wilian 4.34 m/s    LVOT stroke volume 56.27 cm3    AV peak gradient 13 mmHg    PV mean gradient 0.59 mmHg    E/E' ratio 10.57 m/s    MV Peak E Wilian 1.11 m/s    TR Max Wilian 2.34 m/s    MV Peak A Wilian 0.35 m/s    LV Systolic Volume 51.80 mL    LV Systolic Volume Index 24.2 mL/m2    LV Diastolic Volume 123.09 mL    LV Diastolic Volume Index 57.52 mL/m2    LV Mass Index 141 g/m2    Echo EF Estimated 58 %    RA Major Axis 6.21 cm    Left Atrium Minor Axis 7.25 cm    Left Atrium Major Axis 7.07 cm    Triscuspid Valve Regurgitation Peak Gradient 22 mmHg    RA Width 5.24 cm    Right Atrial Pressure (from IVC) 15 mmHg    EF 35 %    TV rest pulmonary artery pressure 37 mmHg    Narrative    · The left ventricle is normal in size with concentric hypertrophy and   moderately decreased systolic function.  · The estimated ejection fraction is 35%.  · Grade III left ventricular diastolic dysfunction.  · Mild  right ventricular enlargement with moderately reduced right   ventricular systolic function.  · Mild left atrial enlargement.  · Mild right atrial enlargement.  · There is mild aortic valve stenosis.  · Aortic valve area is 1.53 cm2; peak velocity is 1.78 m/s; mean gradient   is 7 mmHg.  · Mild mitral regurgitation.  · Mild tricuspid regurgitation.  · Elevated central venous pressure (15 mmHg).  · The estimated PA systolic pressure is 37 mmHg.  · There is abnormal septal wall motion. There is systolic and diastolic   flattening of the interventricular septum consistent with right ventricle   pressure and volume overload.  · There is moderate left ventricular global hypokinesis.        Assessment and Plan:     Brief HPI: stable for DC    * Acute on chronic diastolic congestive heart failure  -Presents with decompensated diastolic CHF, in part due to dietary indiscretion  -Continue IV diuresis  -Continue ARB, Aldactone  -Strict I's/O's  -Echo pending  -Patient counseled on dietary restrictions    4/30/22 - diuresed well, stable for DC - Lasix 40 mg BID and f/u in CHF clinic; ECHO with dec EF will titrate meds - could not afford Entresto in past but may benefit from reapplying for assistance     Diabetes mellitus type 2 in obese  -Mgmt as per hospital medicine    Stage 4 chronic kidney disease  -Monitor with diuresis     Atrial fibrillation with chronic anticoagulation with Eliquis  -History of PAF, currently in SR  -Continue eliquis    CAD (coronary artery disease)  -Stable, chest pain free  -Continue OMT as tolerated  -Echo pending    Hypertension  -Continue home meds-Aldactone, Losartan  -Monitor BP trend        VTE Risk Mitigation (From admission, onward)         Ordered     apixaban tablet 2.5 mg  2 times daily         04/28/22 2001     Place sequential compression device  Until discontinued         04/28/22 2005                Phillip Hood Md, MD  Cardiology  [facility] ([facility])

## 2022-04-30 NOTE — PLAN OF CARE
"AAOx4  Independent, spouse at bedside.    /62 (BP Location: Right arm, Patient Position: Lying)   Pulse 62   Temp 97.4 °F (36.3 °C) (Oral)   Resp 18   Ht 5' 9" (1.753 m)   Wt 100.3 kg (221 lb 1.9 oz)   SpO2 98%   BMI 32.65 kg/m²     DC plan discussed with patient and spouse, both verbalized understanding.  "

## 2022-05-02 ENCOUNTER — OFFICE VISIT (OUTPATIENT)
Dept: FAMILY MEDICINE | Facility: CLINIC | Age: 82
End: 2022-05-02
Payer: MEDICARE

## 2022-05-02 VITALS
SYSTOLIC BLOOD PRESSURE: 120 MMHG | HEIGHT: 69 IN | OXYGEN SATURATION: 96 % | TEMPERATURE: 98 F | WEIGHT: 208.13 LBS | DIASTOLIC BLOOD PRESSURE: 60 MMHG | BODY MASS INDEX: 30.83 KG/M2 | HEART RATE: 80 BPM

## 2022-05-02 DIAGNOSIS — N18.4 STAGE 4 CHRONIC KIDNEY DISEASE: ICD-10-CM

## 2022-05-02 DIAGNOSIS — I25.118 CORONARY ARTERY DISEASE OF NATIVE ARTERY OF NATIVE HEART WITH STABLE ANGINA PECTORIS: ICD-10-CM

## 2022-05-02 DIAGNOSIS — I48.0 PAROXYSMAL ATRIAL FIBRILLATION: ICD-10-CM

## 2022-05-02 DIAGNOSIS — I50.33 ACUTE ON CHRONIC DIASTOLIC CONGESTIVE HEART FAILURE: Primary | ICD-10-CM

## 2022-05-02 PROCEDURE — 3288F FALL RISK ASSESSMENT DOCD: CPT | Mod: CPTII,S$GLB,, | Performed by: NURSE PRACTITIONER

## 2022-05-02 PROCEDURE — 1101F PT FALLS ASSESS-DOCD LE1/YR: CPT | Mod: CPTII,S$GLB,, | Performed by: NURSE PRACTITIONER

## 2022-05-02 PROCEDURE — 3072F LOW RISK FOR RETINOPATHY: CPT | Mod: CPTII,S$GLB,, | Performed by: NURSE PRACTITIONER

## 2022-05-02 PROCEDURE — 3074F PR MOST RECENT SYSTOLIC BLOOD PRESSURE < 130 MM HG: ICD-10-PCS | Mod: CPTII,S$GLB,, | Performed by: NURSE PRACTITIONER

## 2022-05-02 PROCEDURE — 99499 UNLISTED E&M SERVICE: CPT | Mod: S$GLB,,, | Performed by: NURSE PRACTITIONER

## 2022-05-02 PROCEDURE — 1126F AMNT PAIN NOTED NONE PRSNT: CPT | Mod: CPTII,S$GLB,, | Performed by: NURSE PRACTITIONER

## 2022-05-02 PROCEDURE — 99496 TRANSJ CARE MGMT HIGH F2F 7D: CPT | Mod: S$GLB,,, | Performed by: NURSE PRACTITIONER

## 2022-05-02 PROCEDURE — 3078F DIAST BP <80 MM HG: CPT | Mod: CPTII,S$GLB,, | Performed by: NURSE PRACTITIONER

## 2022-05-02 PROCEDURE — 3288F PR FALLS RISK ASSESSMENT DOCUMENTED: ICD-10-PCS | Mod: CPTII,S$GLB,, | Performed by: NURSE PRACTITIONER

## 2022-05-02 PROCEDURE — 99999 PR PBB SHADOW E&M-EST. PATIENT-LVL V: ICD-10-PCS | Mod: PBBFAC,,, | Performed by: NURSE PRACTITIONER

## 2022-05-02 PROCEDURE — 1160F PR REVIEW ALL MEDS BY PRESCRIBER/CLIN PHARMACIST DOCUMENTED: ICD-10-PCS | Mod: CPTII,S$GLB,, | Performed by: NURSE PRACTITIONER

## 2022-05-02 PROCEDURE — 1101F PR PT FALLS ASSESS DOC 0-1 FALLS W/OUT INJ PAST YR: ICD-10-PCS | Mod: CPTII,S$GLB,, | Performed by: NURSE PRACTITIONER

## 2022-05-02 PROCEDURE — 1160F RVW MEDS BY RX/DR IN RCRD: CPT | Mod: CPTII,S$GLB,, | Performed by: NURSE PRACTITIONER

## 2022-05-02 PROCEDURE — 3072F PR LOW RISK FOR RETINOPATHY: ICD-10-PCS | Mod: CPTII,S$GLB,, | Performed by: NURSE PRACTITIONER

## 2022-05-02 PROCEDURE — 3078F PR MOST RECENT DIASTOLIC BLOOD PRESSURE < 80 MM HG: ICD-10-PCS | Mod: CPTII,S$GLB,, | Performed by: NURSE PRACTITIONER

## 2022-05-02 PROCEDURE — 99999 PR PBB SHADOW E&M-EST. PATIENT-LVL V: CPT | Mod: PBBFAC,,, | Performed by: NURSE PRACTITIONER

## 2022-05-02 PROCEDURE — 1159F MED LIST DOCD IN RCRD: CPT | Mod: CPTII,S$GLB,, | Performed by: NURSE PRACTITIONER

## 2022-05-02 PROCEDURE — 3074F SYST BP LT 130 MM HG: CPT | Mod: CPTII,S$GLB,, | Performed by: NURSE PRACTITIONER

## 2022-05-02 PROCEDURE — 99499 RISK ADDL DX/OHS AUDIT: ICD-10-PCS | Mod: S$GLB,,, | Performed by: NURSE PRACTITIONER

## 2022-05-02 PROCEDURE — 99496 TRANSITIONAL CARE MANAGE SERVICE 7 DAY DISCHARGE: ICD-10-PCS | Mod: S$GLB,,, | Performed by: NURSE PRACTITIONER

## 2022-05-02 PROCEDURE — 1159F PR MEDICATION LIST DOCUMENTED IN MEDICAL RECORD: ICD-10-PCS | Mod: CPTII,S$GLB,, | Performed by: NURSE PRACTITIONER

## 2022-05-02 PROCEDURE — 1126F PR PAIN SEVERITY QUANTIFIED, NO PAIN PRESENT: ICD-10-PCS | Mod: CPTII,S$GLB,, | Performed by: NURSE PRACTITIONER

## 2022-05-02 NOTE — PROGRESS NOTES
Subjective:       Patient ID: Jake Ortiz is a 81 y.o. male.    Chief Complaint: Hospital Follow Up  Transitional Care Note    Family and/or Caretaker present at visit?  No.  Diagnostic tests reviewed/disposition: I have reviewed all completed as well as pending diagnostic tests at the time of discharge.  Disease/illness education: CHF  Home health/community services discussion/referrals: Patient does not have home health established from hospital visit.  They do not need home health.  If needed, we will set up home health for the patient.   Establishment or re-establishment of referral orders for community resources: No other necessary community resources.   Discussion with other health care providers: No discussion with other health care providers necessary.         HPI:   80 y/o WM with hx of pacemaker, diastolic HF, HTN, HLD, CAD, MI, CABG x3, DDD, psoriasis, prostate CA, Afib, cardiomegaly, restrictive lung disease, GERD, NASREEN, anemia, back pain, CKD4, DM2, tonsillectomy sent to ED from cardiology clinic with c/o gradually increasing SOB (worst on exertion), weight gain and BLE edema over the previous week. Symptoms are progressive and of moderate severity, without known mitigating factors. Denies chest pain, palpitations, wheezing, abdominal pain, N/V/D, constipation, dysuria, flank pain, HA, dizziness, weakness, fever, cough, chills, falls/trauma, blurred vision, focal deficits. Vital signs stable in ED - pt was afebrile; WBCs 3.95, H&H 8.8&30.5, platelets 336, Na 142, K+ 3.8, BUN 19, creatinine 1.5, GFR 44, , , CPK 97, troponin negative; COVID-19 negative, UA uninfected, Cxray showed interval improvement in bibasilar opacities; EKG showed Vpaced rhythm (62 BPM). In ED the pt was given 1g tylenol and 60mg IV lasix with good urine output     Since discharge pt denies worsening SOB, chest pain or cough.  Able to sleep lying down.  Notes some peripheral edema.  Now monitoring sodium intake.   Attributed exacerbation by eating boiled crawfish.      Echo 4/29/22    · The left ventricle is normal in size with concentric hypertrophy and moderately decreased systolic function.  · The estimated ejection fraction is 35%.  · Grade III left ventricular diastolic dysfunction.  · Mild right ventricular enlargement with moderately reduced right ventricular systolic function.  · Mild left atrial enlargement.  · Mild right atrial enlargement.  · There is mild aortic valve stenosis.  · Aortic valve area is 1.53 cm2; peak velocity is 1.78 m/s; mean gradient is 7 mmHg.  · Mild mitral regurgitation.  · Mild tricuspid regurgitation.  · Elevated central venous pressure (15 mmHg).  · The estimated PA systolic pressure is 37 mmHg.  · There is abnormal septal wall motion. There is systolic and diastolic flattening of the interventricular septum consistent with right ventricle pressure and volume overload.  · There is moderate left ventricular global hypokinesis.    Past Medical History:  No date: Abnormal PFT  No date: Anemia  No date: Arthritis  10/19/2017: Atrial fibrillation  No date: Back pain  3/5/2018: Congestive heart failure  No date: Coronary artery disease  2018: DM (diabetes mellitus)      Comment:   am 06/23/2020  2016: DM (diabetes mellitus)      Comment:   am 08/17/2021  No date: Hyperlipemia  No date: Hypertension  No date: Myocardial infarction  No date: Obesity  6/5/2018: NASREEN (obstructive sleep apnea)  2015: Prostate cancer  No date: Tobacco dependence  No date: Type 2 diabetes mellitus    Review of Systems   Constitutional: Negative for fatigue and fever.   HENT: Negative.    Respiratory: Negative for cough, shortness of breath and wheezing.    Cardiovascular: Positive for leg swelling. Negative for chest pain and palpitations.   Gastrointestinal: Negative.    Musculoskeletal: Positive for back pain.   Skin: Negative.    Psychiatric/Behavioral: The patient is nervous/anxious.        Objective:       Physical Exam  Vitals reviewed.   HENT:      Head: Normocephalic.      Right Ear: External ear normal.      Left Ear: External ear normal.      Mouth/Throat:      Mouth: Mucous membranes are dry.   Eyes:      Extraocular Movements: Extraocular movements intact.   Cardiovascular:      Rate and Rhythm: Normal rate.      Heart sounds: Normal heart sounds.   Pulmonary:      Effort: Pulmonary effort is normal. No respiratory distress.      Breath sounds: Examination of the right-lower field reveals decreased breath sounds. Examination of the left-lower field reveals decreased breath sounds. Decreased breath sounds present.   Musculoskeletal:      Cervical back: Normal range of motion.   Skin:     General: Skin is warm and dry.   Neurological:      Mental Status: He is oriented to person, place, and time.   Psychiatric:         Behavior: Behavior normal.         Assessment:       1. Acute on chronic diastolic congestive heart failure    2. Stage 4 chronic kidney disease    3. Coronary artery disease of native artery of native heart with stable angina pectoris    4. Paroxysmal atrial fibrillation        Plan:   Acute on chronic diastolic congestive heart failure    Stage 4 chronic kidney disease    Coronary artery disease of native artery of native heart with stable angina pectoris    Paroxysmal atrial fibrillation    Educated on low sodium low fat diet.  Verbalized understanding.  Follow up with cardiology scheduled.  Follow up with PCP at regularly scheduled interval   No follow-ups on file.

## 2022-05-03 ENCOUNTER — TELEPHONE (OUTPATIENT)
Dept: TRANSPLANT | Facility: CLINIC | Age: 82
End: 2022-05-03
Payer: MEDICARE

## 2022-05-03 NOTE — TELEPHONE ENCOUNTER
"[facility] hospital discharge 48-72 hour phone follow up completed.     Most Recent Hospital Discharge Date: 4/30/2022  Last admission Diagnosis/chief complaint:Acute on chronic diastolic HF    [facility] RN Navigator spoke with the patient.    Current Patient reported weight from most recent to past:  203.4 lb  203.5 lb  206.2 lb    Current Patient reported blood pressure and heart rate from most recent to past:   130/64, 63  132/66, 63  134/73, 64    Pt reports the following:  Pt informed is feeling much better.  []  Shortness of Breath with Activity  []  Shortness of Breath at rest   []  Fatigue  [x]  Edema , much improved and almost gone per patient  [] Chest pain or tightness  [] Weight Increase since discharge  [] None of the above    Medications:    Discharge medication reviewed with pt.  Pt reports having medication list available and has all medications at home for use per list. Requested patient bring medications to f/u appt.  Pt informed is taking Lasix 80 mg BID for 3 days as per Dr. Hood and discharge AVS, then to resume 40 mg BID unless further signs of swelling.    Education:   Confirmed pt received "8 Step Plan for Heart Failure Patients" while admitted.  Reviewed key points as listed below.      Recommend 2 -3 gram sodium restriction and 1500 cc-2000 cc fluid restriction.   Encourage physical activity with graded exercise program.   Requested patient to weigh themselves daily, and to notify us if their weight increases by more than 3 lbs in 1 day or 5 lbs in 3 days.     Watch for these Signs and Symptoms: If any of these occur, contact [facility] immediately:   Increase in shortness of breath with movement   Increase in swelling in your legs and ankles   Weight gain of more than 3 pounds in a day or 5 pounds in 3 days.   Difficulty breathing when you are lying down   Worsening fatigue or tiredness   Stomach bloating, a full feeling or a loss of appetite   " Increased coughing--especially when you are lying down      Reviewed hospital f/u appt with Merari Franco NP on 5/11/2022    Patient active on myChart? yes      Pt given the following contact information for ease of communication: [phone] (Mon-Fri, 8a-5p) & 911 for emergency.  Pt also encouraged utilize [facility] messaging as well.

## 2022-05-04 NOTE — PROGRESS NOTES
Established Patient Chronic Pain Note     Referring Physician: No ref. provider found    PCP: Byron Stevens MD    Chief Complaint:   LBP     SUBJECTIVE:  Interval history 05/10/2022  Patient presents for six-month follow-up.  He continues to report lower back pain at the level of L3-4 which radiates anteriorly.  Patient reports pain is exacerbated with prolonged standing and with ambulation.  Patient is interested in pursuing intervention.  Today patient denies significant lower extremity radiculopathy.  Again patient reiterates no significant relief following prior lumbar medial branch blocks or radiofrequency ablation.  Patient has read spinal cord stimulation literature and would like to continue with transforaminal injection at this time.    Interval history 11/17/2020  Patient presents for CT lumbar spine review.  Today patient reports pain has been relatively well controlled.  Patient reports he was dancing earlier today.  When pain is severe patient reports pain in the lower back which radiates down the right lower extremity along the lateral distribution to the calf.  Patient denies significant weakness in the lower extremities or new onset bowel or bladder incontinence or gait ataxia.  Patient reviewed spinal cord stimulation educational material and would like to pursue pharmacologic management prior to trialing this.    HPI:  09/22/2021  Jake Ortiz is a 81 y.o. male with past medical history significant for chronic restrictive lung disease, lumbar spondylosis with radiculopathy status post L2-5 laminectomy, hypertension, hyperlipidemia, coronary artery disease status post myocardial infarctions status post triple-vessel CABG, atrial fibrillation, congestive heart failure status post percutaneous pacemaker placement, stage 4 chronic kidney disease, prostate adenocarcinoma, type 2 diabetes, GERD, obstructive sleep apnea, multi joint arthralgia who presents to the clinic for the evaluation of lower  back and leg pain.  Patient reports longstanding history of lower back pain which is constant with radiation down the right lower extremity in L4-5 distribution to the calf.  Patient denies any radicular symptoms on the left.  Patient denies any significant weakness in bilateral lower extremities.  Pain is described as aching in nature and is a 7/10 today.  Pain at its worst is 10/10.  Pain is exacerbated with prolonged standing and with ambulation as well as with crossing his legs.  Patient is able to ambulate approximately 100 ft before requiring rest.  Pain is improved with lumbar flexion, stretching.  Patient has received multiple prior interventions including medial branch block, radiofrequency ablation, epidural and caudal epidural steroid injection without meaningful relief.    Of note patient last saw Dr. Trivedi December 16, 2020 with recommendation for medial branch block and consideration of radiofrequency ablation.    Patient denies night fever/night sweats, urinary incontinence, bowel incontinence, significant weight loss, significant motor weakness and loss of sensations.    Pain Disability Index Review:     Last 3 PDI Scores 5/10/2022 11/17/2021 5/19/2021   Pain Disability Index (PDI) 42 20 31       Non-Pharmacologic Treatments:  Physical Therapy/Home Exercise: yes  Ice/Heat:yes  TENS: no  Acupuncture: no  Massage: no  Chiropractic: no    Other: no      Pain Medications:  - Adjuvant Medications: Clonazepam (Klonopin) and Tylenol (Acetaminophen)  - Anti-Coagulants: Aspirin, Plavix ( Clopidogrel) and Eliquis    Pain Procedures:   Surgeries:  Lumbar surgery with Dr. Powers with complications with staph infection causing 2 subsequent surgeries  Injections:  -June 4, 2021:  Left L3-5 lumbar radiofrequency ablation  -December 21, 2020:  Bilateral L3-5 medial branch block with steroid  - series of 3 injections (what sounds like ESIs) about 30 years ago with relief  - Lumbar Medial Branch Blocks and Lumbar  Radiofrequency Ablation with limited relief  - L5/S1 IL XUAN on 11/22/19 with some pain relief for a few days  - L5/S1 IL XUAN on 1/6/2020 with limited pain relief for a few days  - caudal XUAN on 2/10/20 with 15% pain relief x 2 days  -10/12/2020 bilateral L3-5 medial branch blocks, 50% relief    Past Medical History:   Diagnosis Date    Abnormal PFT     Anemia     Arthritis     Atrial fibrillation 10/19/2017    Back pain     Congestive heart failure 3/5/2018    Coronary artery disease     DM (diabetes mellitus) 2018     am 06/23/2020    DM (diabetes mellitus) 2016     am 08/17/2021    Hyperlipemia     Hypertension     Myocardial infarction     Obesity     NASREEN (obstructive sleep apnea) 6/5/2018    Prostate cancer 2015    Tobacco dependence     Type 2 diabetes mellitus      Past Surgical History:   Procedure Laterality Date    COLONOSCOPY N/A 9/22/2020    Procedure: COLONOSCOPY;  Surgeon: Javier Farmer MD;  Location: [facility] ENDO;  Service: Endoscopy;  Laterality: N/A;    CORONARY ARTERY BYPASS GRAFT  1987    EPIDURAL STEROID INJECTION N/A 11/22/2019    Procedure: Lumbar L5/S1 IL XUAN;  Surgeon: Tacho Trivedi MD;  Location: [facility];  Service: Pain Management;  Laterality: N/A;    EPIDURAL STEROID INJECTION N/A 1/6/2020    Procedure: Lumbar L5/S1 IL XUAN;  Surgeon: Tacho Trivedi MD;  Location: [facility];  Service: Pain Management;  Laterality: N/A;    EPIDURAL STEROID INJECTION N/A 2/10/2020    Procedure: Caudal XUAN;  Surgeon: Tacho Trivedi MD;  Location: [facility];  Service: Pain Management;  Laterality: N/A;    ESOPHAGOGASTRODUODENOSCOPY N/A 9/22/2020    Procedure: EGD (ESOPHAGOGASTRODUODENOSCOPY);  Surgeon: Javier Farmer MD;  Location: [facility] ENDO;  Service: Endoscopy;  Laterality: N/A;    INJECTION OF ANESTHETIC AGENT AROUND MEDIAL BRANCH NERVES INNERVATING LUMBAR FACET JOINT Bilateral 10/12/2020    Procedure: Bilateral L3-5 MBB;   Surgeon: Tacho Trivedi MD;  Location: [facility]GT;  Service: Pain Management;  Laterality: Bilateral;    INJECTION OF ANESTHETIC AGENT AROUND MEDIAL BRANCH NERVES INNERVATING LUMBAR FACET JOINT Bilateral 12/21/2020    Procedure: Bilateral L3-5 MBB with steroid;  Surgeon: Tacho Trivedi MD;  Location: [facility]GT;  Service: Pain Management;  Laterality: Bilateral;    INTRALUMINAL GASTROINTESTINAL TRACT IMAGING VIA CAPSULE N/A 12/29/2021    Procedure: IMAGING PROCEDURE, GI TRACT, INTRALUMINAL, VIA CAPSULE;  Surgeon: Tahir Geronimo RN;  Location: [facility] ENDO;  Service: Endoscopy;  Laterality: N/A;    PROSTATE SURGERY      prostate radiation    RADIOFREQUENCY THERMOCOAGULATION Left 6/4/2021    Procedure: Left L3-5 Lumbar RFA;  Surgeon: Tacho Trivedi MD;  Location: [facility]GT;  Service: Pain Management;  Laterality: Left;    RADIOFREQUENCY THERMOCOAGULATION Right 6/18/2021    Procedure: Right L3-5 Lumbar RFA;  Surgeon: Tacho Trivedi MD;  Location: [facility]GT;  Service: Pain Management;  Laterality: Right;    SPINE SURGERY      fusion    TONSILLECTOMY       Review of patient's allergies indicates:  No Known Allergies    Current Outpatient Medications   Medication Sig    acetaminophen (TYLENOL) 500 MG tablet Take 2 tablets (1,000 mg total) by mouth every 6 (six) hours as needed for Pain.    apixaban (ELIQUIS) 2.5 mg Tab Take 1 tablet (2.5 mg total) by mouth 2 (two) times daily.    aspirin 81 MG Chew Take 1 tablet (81 mg total) by mouth once daily.    atorvastatin (LIPITOR) 80 MG tablet One tablet daily (Patient taking differently: Take 80 mg by mouth every evening.)    blood sugar diagnostic Strp Check blood glucose levels daily in the AM fasting and 1-2 times more daily.    cholecalciferol, vitamin D3, (VITAMIN D3) 25 mcg (1,000 unit) capsule Take 1,000 Units by mouth once daily.    clonazePAM (KLONOPIN) 1 MG tablet Take 1 tablet (1 mg total) by mouth nightly as needed for Anxiety.     fluticasone propionate (FLONASE) 50 mcg/actuation nasal spray 2 sprays (100 mcg total) by Each Nostril route once daily.    furosemide (LASIX) 40 MG tablet Take 1 tablet (40 mg total) by mouth 2 (two) times daily. Can double to 2 tablets twice a day for 3 days for more swelling/fluid build up - take 80mg twice a day    krill/om-3/dha/epa/phospho/ast (MEGARED OMEGA-3 KRILL OIL ORAL) Take 1 capsule by mouth every morning.    lancets Misc Check blood glucose levels daily in the AM fasting and 1-2 times more daily.    levocetirizine (XYZAL) 5 MG tablet TAKE 1 TABLET EVERY EVENING    losartan (COZAAR) 50 MG tablet Take 1 tablet (50 mg total) by mouth once daily.    magnesium oxide (MAG-OX) 400 mg (241.3 mg magnesium) tablet Take 1 tablet (400 mg total) by mouth once daily.    multivitamin capsule Take 1 capsule by mouth once daily.    pantoprazole (PROTONIX) 40 MG tablet Take 1 tablet (40 mg total) by mouth once daily.    potassium chloride SA (K-DUR,KLOR-CON) 20 MEQ tablet Take 1 tablet (20 mEq total) by mouth once daily.    spironolactone (ALDACTONE) 25 MG tablet Take 1 tablet (25 mg total) by mouth once daily.    traMADoL (ULTRAM) 50 mg tablet Take 12.5 mg by mouth every 6 (six) hours as needed for Pain.    vit A/vit C/vit E/zinc/copper (OCUVITE PRESERVISION ORAL) Take 1 capsule by mouth every evening.     No current facility-administered medications for this visit.     Facility-Administered Medications Ordered in Other Visits   Medication    ondansetron injection 4 mg       Review of Systems     GENERAL:  No weight loss, malaise or fevers.  HEENT:   No recent changes in vision or hearing  NECK:  Negative for lumps, no difficulty with swallowing.  RESPIRATORY:  Negative for cough, wheezing or shortness of breath, patient denies any recent URI.  CARDIOVASCULAR:  Negative for chest pain, leg swelling or palpitations.  GI:  Negative for abdominal discomfort, blood in stools or black stools or change in bowel  "habits.  MUSCULOSKELETAL:  See HPI.  SKIN:  Negative for lesions, rash, and itching.  PSYCH:  No mood disorder or recent psychosocial stressors.   HEMATOLOGY/LYMPHOLOGY:  Negative for prolonged bleeding, bruising easily or swollen nodes.    NEURO:   No history of headaches, syncope, paralysis, seizures or tremors.  All other reviewed and negative other than HPI.    OBJECTIVE:    /61   Pulse 85   Ht 5' 9" (1.753 m)   Wt 89 kg (196 lb 3.4 oz)   BMI 28.98 kg/m²       Physical Exam    GENERAL: Well appearing, in no acute distress, alert and oriented x3.  PSYCH:  Mood and affect appropriate.  SKIN: Skin color, texture, turgor normal, no rashes or lesions.  HEAD/FACE:  Normocephalic, atraumatic. Cranial nerves grossly intact.    CV: RRR with palpation of the radial artery.  PULM: No evidence of respiratory difficulty, symmetric chest rise.  GI:  Soft and non-tender.    BACK: Straight leg raising in the sitting and supine positions is negative to radicular pain. No pain to palpation over the facet joints of the lumbar spine or spinous processes. Normal range of motion without pain reproduction.  EXTREMITIES: Peripheral joint ROM is full and pain free without obvious instability or laxity in all four extremities. No deformities, edema, or skin discoloration. Good capillary refill.  MUSCULOSKELETAL: Able to stand on heels & toes.   Shoulder, hip, and knee provocative maneuvers are negative.  There is no pain with palpation over the sacroiliac joints bilaterally.  FABERs test is negative.  FAER test is negative bilaterally.   Bilateral upper and lower extremity strength is normal and symmetric.  No atrophy or tone abnormalities are noted.  RIGHT Lower extremity: Hip flexion 5/5, Hip Abduction 5/5, Hip Adduction 5/5, Knee extension 5/5, Knee flexion 5/5, Ankle dorsiflexion5/5, Extensor hallucis longus 5/5, Ankle plantarflexion 5/5  LEFT Lower extremity:  Hip flexion 5/5, Hip Abduction 5/5,Hip Adduction 5/5, Knee " extension 5/5, Knee flexion 5/5, Ankle dorsiflexion 5/5, Extensor hallucis longus 5/5, Ankle plantarflexion 5/5  -Normaltesting knee (patellar) jerk and ankle (achilles) jerk    NEURO: Bilateral upper and lower extremity coordination and muscle stretch reflexes are physiologic and symmetric. No loss of sensation is noted.  GAIT: normal.    Imaging:   CT lumbar spine 09/22/2021  FINDINGS:  Right total nephrectomy again noted.  Advanced atherosclerotic calcification.     Levoconvex lower lumbar scoliotic curvature secondary to asymmetric disc height loss on the right at L3-4 and L4-5.     T12-L1: Severe disc height loss with endplate sclerosis, vacuum disc phenomenon, and anterior osteophyte formation.  No spinal canal stenosis.  No significant right neural foraminal stenosis.  Moderate left neural foraminal stenosis.     L1-2: No significant disc height loss.  Hypertrophic facet arthropathy.     L2-3: Chronic ankylosis of L2-3.  Moderate right and mild left neural foraminal stenosis.  No spinal canal stenosis.  Ankylosis of posterior elements also noted.     L3-4: Severe disc height loss with endplate sclerosis and osteophyte formation.  Severe right and moderate to severe left neural foraminal stenosis.  Severe hypertrophic facet arthropathy.     L4-5: Laminectomy changes noted. Leftward chronic subluxation of L4 relative to L3 and L5 with right worse than left disc height loss with endplate sclerosis and osteophyte formation.  Right-sided L4 vertebral height loss.  Severe right and mild left neural foraminal stenosis.  Severe hypertrophic facet arthropathy.     L5-S1: No significant disc height loss.  Mild left neural foraminal stenosis.  Severe hypertrophic facet arthropathy.     Impression:   Severe chronic multilevel discogenic degenerative change and facet arthropathy of the lumbar spine as described above.        03/03/11    MRI Lumbar Spine Without Contrast    RESULTS: THIS STUDY DEMONSTRATES SEVERE  DEGENERATIVE END PLATE CHANGES AT  L4-5 AND L2-3.  THERE IS A LARGE ANTERIOR OSTEOPHYTE PROJECTING OFF THE  END PLATES OF L3, L2, AND L4.  THERE ARE ALSO PROMINENT DISC BULGES AT  L4-5, L3-4, AND L2-3.  THIS STUDY DEMONSTRATES MULTILEVEL FACET  ARTHROPATHY.    IMPRESSION: PROMINENT DEGENERATIVE DISC DISEASE AS DESCRIBED IN THE ABOVE  PARAGRAPH. THIS STUDY ALSO DEMONSTRATES PROMINENT RIGHT NEURAL FORAMINAL  NARROWING AT L4-5 AND LEFT NEURAL FORAMINAL NARROWING AT L4-5.    06/08/11  MRI Lumbar Spine W WO Cont    RESULTS:  Indication:     Back pain patient's had extensive operative intervention  with laminectomy starting at L3 and extending to mid L4. There also  appears to be a right laminectomy at L4-L5. Multiple pedicle screws  apparently have been removed.    L1-L2 level unremarkable.    L2-L3 disc is narrowed there is mild spondylosis with minor facet  arthropathy present. There is some posterior paravertebral edema present  right greater than left extending from right facet lesion of L2-L3. There  is mild central stenosis present but the L2 nerve roots exit without  contact or distortion. There screw artifacts present in the pedicles at  L2 and L3-L4 and L5.    L3-L4 disc is narrowed there is mild spondylosis patient's had  laminectomy there is extensive para spinous muscular edema with fluid  along the left lamina and paraspinous region is also considerable loss of  fat plane within the paravertebral musculature of the spine from L1-L2  down to sacral region.    L4-L5 level is obliterated right laminectomy is present is extensive  edema and loss of fat planes within the paravertebral musculature and  around the posterior and lateral thecal sac.    The L5-S1 disc is intact there is facet arthropathy present.    Postcontrast study demonstrates diffuse enhancement of the  paravertebral  soft tissues starting at approximately the T12 and extending to the  sacral level.    There is diffuse enhancement of the L2-L3  disc with enhancing tissues of  the posterior paraspinous region and minor facet joint enhancement. There  is also enhancement of the posterior epidural region resulting in some  distortion of the dorsal lateral thecal sac, left greater than right.    The L3-L4 level demonstrates minimal central and left-sided enhancement  of the disc with extensive  enhancement of the paraspinous elements with  enhancement of the posterior and lateral epidural canal. No intradural  enhancement is noted. There is some enhancement around the right residual  facet . A portion of the left facet complex is absent..    The L4-L5 level again demonstrates comparable diffuse paraspinous  enhancement and enhancement surrounding the thecal sac within the central  canal. There is enhancement of the bony elements including the pedicles  and facet complex.    L5-S1 demonstrate some  facet enhancement and posterior perithecal  enhancement        IMPRESSION:  Patient's had extensive operative intervention with removal of  pedicle screws at L2-L3 L4 and L5. There is extensive enhancement of the  operative bed in the posterior epidural region as well is some  enhancement of the scar tissue around the thecal sac. There Is no  intradural or intramedullary enhancement although there is clumping of  the cauda and enhancement of the L2-L3 and L4-L5 disc. There is also  small amount enhancement of the posterior aspect of the L3-L4 disc.  enhancement consistent with gliosis. The various levels of bone  enhancement including the facet complex at L3 and L4 as well as portions  of the pedicles involving L5 involving and adjacent to the screw tracks..      09/25/19    X-Ray Lumbar Spine Complete 5 View    FINDINGS:  Scoliosis remains.  Vertebral body heights and alignment are stable.  Multilevel advanced degenerative disc height loss and osteophyte formation noted.  Multilevel facet arthropathy present more prevalent at the lower lumbar spine.  No acute  osseous abnormality appreciated.  Aorta iliac atherosclerotic vascular calcifications noted.    Impression  Similar appearance of the spine.  Correlate with MRI exam as clinically indicated.    08/26/14    X-Ray Cervical Spine AP And Lateral    Narrative  Findings: Vertebral alignment is normal.  There is narrowing of the disk spaces and  anterior osteophyte formation C 3-7.  There are no compression fractures or acute abnormalities are seen.  Carotid calcifications are noted bilaterally.  IMPRESSION:  Advanced degenerative change in the cervical spine.        ASSESSMENT: 81 y.o. year old male with lower back and right leg pain, consistent with     1. Failed back surgical syndrome  IR Epidural Transfor 1st Vert Lumbar Ed    Case Request-RAD/Other Procedure Area: Bilateral L3/4 TF XUAN with RN IV sedation   2. Lumbar radiculopathy, chronic  IR Epidural Transfor 1st Vert Lumbar Ed    Case Request-RAD/Other Procedure Area: Bilateral L3/4 TF XUAN with RN IV sedation   3. Lumbar spondylosis     4. DDD (degenerative disc disease), lumbar           PLAN:   - Interventions:   Schedule for bilateral L3/4 transforaminal epidural steroid injection to see if this helps with pain.  We discussed the procedure, benefits and risk in detail.  Patient has elected to pursue this procedure.    -patient is a candidate for spinal cord stimulation secondary to failed improvement in pain with numerous prior etiologies including pharmacologic management, physical therapy and prior medial branch block, epidural and caudal steroid injections.  Patient has been given educational booklet on spinal cord stimulation.    - Anticoagulation use: yes Plavix and Eliquis  Per ASA guidelines for secondary prophylaxis, patient can continue aspirin for will need to pause Eliquis 3 days prior to his transforaminal injection.  Will obtain cardiac clearance from Dr. Hood     report:  Reviewed and consistent with medication use as prescribed.      -  Medications:  Patient did not tolerate Lyrica titration.  We will pursue interventional treatment.    - Therapy:   We discussed continuing physical therapy to help manage the patient/s painful condition. The patient was counseled that muscle strengthening will improve the long term prognosis in regards to pain and may also help increase range of motion and mobility.       - Imaging: Reviewed available imaging with patient and answered any questions they had regarding study     - Follow up visit: return to clinic in 4-6 weeks      The above plan and management options were discussed at length with patient. Patient is in agreement with the above and verbalized understanding.    - I discussed the goals of interventional chronic pain management with the patient on today's visit. We discussed a multimodal and systematic approach to pain.  This includes diagnostic and therapeutic injections, adjuvant pharmacologic treatment, physical therapy, and at times psychiatry.  I emphasized the importance of regular exercise, core strengthening and stretching, diet and weight loss as a cornerstone of long-term pain management.    - This condition does not require this patient to take time off of work, and the primary goal of our Pain Management services is to improve the patient's functional capacity.  - Patient Questions: Answered all of the patient's questions regarding diagnoses, therapy, treatment and next steps        Philipp Demarco MD  Interventional Pain Management  [facility]    Disclaimer:  This note was prepared using voice recognition system and is likely to have sound alike errors that may have been overlooked even after proof reading.  Please call me with any questions

## 2022-05-04 NOTE — H&P (VIEW-ONLY)
Established Patient Chronic Pain Note     Referring Physician: No ref. provider found    PCP: Byron Stevens MD    Chief Complaint:   LBP     SUBJECTIVE:  Interval history 05/10/2022  Patient presents for six-month follow-up.  He continues to report lower back pain at the level of L3-4 which radiates anteriorly.  Patient reports pain is exacerbated with prolonged standing and with ambulation.  Patient is interested in pursuing intervention.  Today patient denies significant lower extremity radiculopathy.  Again patient reiterates no significant relief following prior lumbar medial branch blocks or radiofrequency ablation.  Patient has read spinal cord stimulation literature and would like to continue with transforaminal injection at this time.    Interval history 11/17/2020  Patient presents for CT lumbar spine review.  Today patient reports pain has been relatively well controlled.  Patient reports he was dancing earlier today.  When pain is severe patient reports pain in the lower back which radiates down the right lower extremity along the lateral distribution to the calf.  Patient denies significant weakness in the lower extremities or new onset bowel or bladder incontinence or gait ataxia.  Patient reviewed spinal cord stimulation educational material and would like to pursue pharmacologic management prior to trialing this.    HPI:  09/22/2021  Jake Ortiz is a 81 y.o. male with past medical history significant for chronic restrictive lung disease, lumbar spondylosis with radiculopathy status post L2-5 laminectomy, hypertension, hyperlipidemia, coronary artery disease status post myocardial infarctions status post triple-vessel CABG, atrial fibrillation, congestive heart failure status post percutaneous pacemaker placement, stage 4 chronic kidney disease, prostate adenocarcinoma, type 2 diabetes, GERD, obstructive sleep apnea, multi joint arthralgia who presents to the clinic for the evaluation of lower  back and leg pain.  Patient reports longstanding history of lower back pain which is constant with radiation down the right lower extremity in L4-5 distribution to the calf.  Patient denies any radicular symptoms on the left.  Patient denies any significant weakness in bilateral lower extremities.  Pain is described as aching in nature and is a 7/10 today.  Pain at its worst is 10/10.  Pain is exacerbated with prolonged standing and with ambulation as well as with crossing his legs.  Patient is able to ambulate approximately 100 ft before requiring rest.  Pain is improved with lumbar flexion, stretching.  Patient has received multiple prior interventions including medial branch block, radiofrequency ablation, epidural and caudal epidural steroid injection without meaningful relief.    Of note patient last saw Dr. Trivedi December 16, 2020 with recommendation for medial branch block and consideration of radiofrequency ablation.    Patient denies night fever/night sweats, urinary incontinence, bowel incontinence, significant weight loss, significant motor weakness and loss of sensations.    Pain Disability Index Review:     Last 3 PDI Scores 5/10/2022 11/17/2021 5/19/2021   Pain Disability Index (PDI) 42 20 31       Non-Pharmacologic Treatments:  Physical Therapy/Home Exercise: yes  Ice/Heat:yes  TENS: no  Acupuncture: no  Massage: no  Chiropractic: no    Other: no      Pain Medications:  - Adjuvant Medications: Clonazepam (Klonopin) and Tylenol (Acetaminophen)  - Anti-Coagulants: Aspirin, Plavix ( Clopidogrel) and Eliquis    Pain Procedures:   Surgeries:  Lumbar surgery with Dr. Powers with complications with staph infection causing 2 subsequent surgeries  Injections:  -June 4, 2021:  Left L3-5 lumbar radiofrequency ablation  -December 21, 2020:  Bilateral L3-5 medial branch block with steroid  - series of 3 injections (what sounds like ESIs) about 30 years ago with relief  - Lumbar Medial Branch Blocks and Lumbar  Radiofrequency Ablation with limited relief  - L5/S1 IL XUAN on 11/22/19 with some pain relief for a few days  - L5/S1 IL XUAN on 1/6/2020 with limited pain relief for a few days  - caudal XUAN on 2/10/20 with 15% pain relief x 2 days  -10/12/2020 bilateral L3-5 medial branch blocks, 50% relief    Past Medical History:   Diagnosis Date    Abnormal PFT     Anemia     Arthritis     Atrial fibrillation 10/19/2017    Back pain     Congestive heart failure 3/5/2018    Coronary artery disease     DM (diabetes mellitus) 2018     am 06/23/2020    DM (diabetes mellitus) 2016     am 08/17/2021    Hyperlipemia     Hypertension     Myocardial infarction     Obesity     NASREEN (obstructive sleep apnea) 6/5/2018    Prostate cancer 2015    Tobacco dependence     Type 2 diabetes mellitus      Past Surgical History:   Procedure Laterality Date    COLONOSCOPY N/A 9/22/2020    Procedure: COLONOSCOPY;  Surgeon: Javier Farmer MD;  Location: [facility] ENDO;  Service: Endoscopy;  Laterality: N/A;    CORONARY ARTERY BYPASS GRAFT  1987    EPIDURAL STEROID INJECTION N/A 11/22/2019    Procedure: Lumbar L5/S1 IL XUAN;  Surgeon: Tacho Trivedi MD;  Location: [facility]GT;  Service: Pain Management;  Laterality: N/A;    EPIDURAL STEROID INJECTION N/A 1/6/2020    Procedure: Lumbar L5/S1 IL XUAN;  Surgeon: Tacho Trivedi MD;  Location: [facility];  Service: Pain Management;  Laterality: N/A;    EPIDURAL STEROID INJECTION N/A 2/10/2020    Procedure: Caudal XUAN;  Surgeon: Tacho Trivedi MD;  Location: [facility];  Service: Pain Management;  Laterality: N/A;    ESOPHAGOGASTRODUODENOSCOPY N/A 9/22/2020    Procedure: EGD (ESOPHAGOGASTRODUODENOSCOPY);  Surgeon: Javier Farmer MD;  Location: [facility] ENDO;  Service: Endoscopy;  Laterality: N/A;    INJECTION OF ANESTHETIC AGENT AROUND MEDIAL BRANCH NERVES INNERVATING LUMBAR FACET JOINT Bilateral 10/12/2020    Procedure: Bilateral L3-5 MBB;   Surgeon: Tacho Trivedi MD;  Location: [facility] PAIN MGT;  Service: Pain Management;  Laterality: Bilateral;    INJECTION OF ANESTHETIC AGENT AROUND MEDIAL BRANCH NERVES INNERVATING LUMBAR FACET JOINT Bilateral 12/21/2020    Procedure: Bilateral L3-5 MBB with steroid;  Surgeon: Tacho Trivedi MD;  Location: [facility] PAIN MGT;  Service: Pain Management;  Laterality: Bilateral;    INTRALUMINAL GASTROINTESTINAL TRACT IMAGING VIA CAPSULE N/A 12/29/2021    Procedure: IMAGING PROCEDURE, GI TRACT, INTRALUMINAL, VIA CAPSULE;  Surgeon: Tahir eGronimo RN;  Location: [facility] ENDO;  Service: Endoscopy;  Laterality: N/A;    PROSTATE SURGERY      prostate radiation    RADIOFREQUENCY THERMOCOAGULATION Left 6/4/2021    Procedure: Left L3-5 Lumbar RFA;  Surgeon: Tacho Trivedi MD;  Location: [facility] PAIN MGT;  Service: Pain Management;  Laterality: Left;    RADIOFREQUENCY THERMOCOAGULATION Right 6/18/2021    Procedure: Right L3-5 Lumbar RFA;  Surgeon: Tacho Trivedi MD;  Location: [facility] PAIN MGT;  Service: Pain Management;  Laterality: Right;    SPINE SURGERY      fusion    TONSILLECTOMY       Review of patient's allergies indicates:  No Known Allergies    Current Outpatient Medications   Medication Sig    acetaminophen (TYLENOL) 500 MG tablet Take 2 tablets (1,000 mg total) by mouth every 6 (six) hours as needed for Pain.    apixaban (ELIQUIS) 2.5 mg Tab Take 1 tablet (2.5 mg total) by mouth 2 (two) times daily.    aspirin 81 MG Chew Take 1 tablet (81 mg total) by mouth once daily.    atorvastatin (LIPITOR) 80 MG tablet One tablet daily (Patient taking differently: Take 80 mg by mouth every evening.)    blood sugar diagnostic Strp Check blood glucose levels daily in the AM fasting and 1-2 times more daily.    cholecalciferol, vitamin D3, (VITAMIN D3) 25 mcg (1,000 unit) capsule Take 1,000 Units by mouth once daily.    clonazePAM (KLONOPIN) 1 MG tablet Take 1 tablet (1 mg total) by mouth nightly as needed for Anxiety.     fluticasone propionate (FLONASE) 50 mcg/actuation nasal spray 2 sprays (100 mcg total) by Each Nostril route once daily.    furosemide (LASIX) 40 MG tablet Take 1 tablet (40 mg total) by mouth 2 (two) times daily. Can double to 2 tablets twice a day for 3 days for more swelling/fluid build up - take 80mg twice a day    krill/om-3/dha/epa/phospho/ast (MEGARED OMEGA-3 KRILL OIL ORAL) Take 1 capsule by mouth every morning.    lancets Misc Check blood glucose levels daily in the AM fasting and 1-2 times more daily.    levocetirizine (XYZAL) 5 MG tablet TAKE 1 TABLET EVERY EVENING    losartan (COZAAR) 50 MG tablet Take 1 tablet (50 mg total) by mouth once daily.    magnesium oxide (MAG-OX) 400 mg (241.3 mg magnesium) tablet Take 1 tablet (400 mg total) by mouth once daily.    multivitamin capsule Take 1 capsule by mouth once daily.    pantoprazole (PROTONIX) 40 MG tablet Take 1 tablet (40 mg total) by mouth once daily.    potassium chloride SA (K-DUR,KLOR-CON) 20 MEQ tablet Take 1 tablet (20 mEq total) by mouth once daily.    spironolactone (ALDACTONE) 25 MG tablet Take 1 tablet (25 mg total) by mouth once daily.    traMADoL (ULTRAM) 50 mg tablet Take 12.5 mg by mouth every 6 (six) hours as needed for Pain.    vit A/vit C/vit E/zinc/copper (OCUVITE PRESERVISION ORAL) Take 1 capsule by mouth every evening.     No current facility-administered medications for this visit.     Facility-Administered Medications Ordered in Other Visits   Medication    ondansetron injection 4 mg       Review of Systems     GENERAL:  No weight loss, malaise or fevers.  HEENT:   No recent changes in vision or hearing  NECK:  Negative for lumps, no difficulty with swallowing.  RESPIRATORY:  Negative for cough, wheezing or shortness of breath, patient denies any recent URI.  CARDIOVASCULAR:  Negative for chest pain, leg swelling or palpitations.  GI:  Negative for abdominal discomfort, blood in stools or black stools or change in bowel  "habits.  MUSCULOSKELETAL:  See HPI.  SKIN:  Negative for lesions, rash, and itching.  PSYCH:  No mood disorder or recent psychosocial stressors.   HEMATOLOGY/LYMPHOLOGY:  Negative for prolonged bleeding, bruising easily or swollen nodes.    NEURO:   No history of headaches, syncope, paralysis, seizures or tremors.  All other reviewed and negative other than HPI.    OBJECTIVE:    /61   Pulse 85   Ht 5' 9" (1.753 m)   Wt 89 kg (196 lb 3.4 oz)   BMI 28.98 kg/m²       Physical Exam    GENERAL: Well appearing, in no acute distress, alert and oriented x3.  PSYCH:  Mood and affect appropriate.  SKIN: Skin color, texture, turgor normal, no rashes or lesions.  HEAD/FACE:  Normocephalic, atraumatic. Cranial nerves grossly intact.    CV: RRR with palpation of the radial artery.  PULM: No evidence of respiratory difficulty, symmetric chest rise.  GI:  Soft and non-tender.    BACK: Straight leg raising in the sitting and supine positions is negative to radicular pain. No pain to palpation over the facet joints of the lumbar spine or spinous processes. Normal range of motion without pain reproduction.  EXTREMITIES: Peripheral joint ROM is full and pain free without obvious instability or laxity in all four extremities. No deformities, edema, or skin discoloration. Good capillary refill.  MUSCULOSKELETAL: Able to stand on heels & toes.   Shoulder, hip, and knee provocative maneuvers are negative.  There is no pain with palpation over the sacroiliac joints bilaterally.  FABERs test is negative.  FAER test is negative bilaterally.   Bilateral upper and lower extremity strength is normal and symmetric.  No atrophy or tone abnormalities are noted.  RIGHT Lower extremity: Hip flexion 5/5, Hip Abduction 5/5, Hip Adduction 5/5, Knee extension 5/5, Knee flexion 5/5, Ankle dorsiflexion5/5, Extensor hallucis longus 5/5, Ankle plantarflexion 5/5  LEFT Lower extremity:  Hip flexion 5/5, Hip Abduction 5/5,Hip Adduction 5/5, Knee " extension 5/5, Knee flexion 5/5, Ankle dorsiflexion 5/5, Extensor hallucis longus 5/5, Ankle plantarflexion 5/5  -Normaltesting knee (patellar) jerk and ankle (achilles) jerk    NEURO: Bilateral upper and lower extremity coordination and muscle stretch reflexes are physiologic and symmetric. No loss of sensation is noted.  GAIT: normal.    Imaging:   CT lumbar spine 09/22/2021  FINDINGS:  Right total nephrectomy again noted.  Advanced atherosclerotic calcification.     Levoconvex lower lumbar scoliotic curvature secondary to asymmetric disc height loss on the right at L3-4 and L4-5.     T12-L1: Severe disc height loss with endplate sclerosis, vacuum disc phenomenon, and anterior osteophyte formation.  No spinal canal stenosis.  No significant right neural foraminal stenosis.  Moderate left neural foraminal stenosis.     L1-2: No significant disc height loss.  Hypertrophic facet arthropathy.     L2-3: Chronic ankylosis of L2-3.  Moderate right and mild left neural foraminal stenosis.  No spinal canal stenosis.  Ankylosis of posterior elements also noted.     L3-4: Severe disc height loss with endplate sclerosis and osteophyte formation.  Severe right and moderate to severe left neural foraminal stenosis.  Severe hypertrophic facet arthropathy.     L4-5: Laminectomy changes noted. Leftward chronic subluxation of L4 relative to L3 and L5 with right worse than left disc height loss with endplate sclerosis and osteophyte formation.  Right-sided L4 vertebral height loss.  Severe right and mild left neural foraminal stenosis.  Severe hypertrophic facet arthropathy.     L5-S1: No significant disc height loss.  Mild left neural foraminal stenosis.  Severe hypertrophic facet arthropathy.     Impression:   Severe chronic multilevel discogenic degenerative change and facet arthropathy of the lumbar spine as described above.        03/03/11    MRI Lumbar Spine Without Contrast    RESULTS: THIS STUDY DEMONSTRATES SEVERE  DEGENERATIVE END PLATE CHANGES AT  L4-5 AND L2-3.  THERE IS A LARGE ANTERIOR OSTEOPHYTE PROJECTING OFF THE  END PLATES OF L3, L2, AND L4.  THERE ARE ALSO PROMINENT DISC BULGES AT  L4-5, L3-4, AND L2-3.  THIS STUDY DEMONSTRATES MULTILEVEL FACET  ARTHROPATHY.    IMPRESSION: PROMINENT DEGENERATIVE DISC DISEASE AS DESCRIBED IN THE ABOVE  PARAGRAPH. THIS STUDY ALSO DEMONSTRATES PROMINENT RIGHT NEURAL FORAMINAL  NARROWING AT L4-5 AND LEFT NEURAL FORAMINAL NARROWING AT L4-5.    06/08/11  MRI Lumbar Spine W WO Cont    RESULTS:  Indication:     Back pain patient's had extensive operative intervention  with laminectomy starting at L3 and extending to mid L4. There also  appears to be a right laminectomy at L4-L5. Multiple pedicle screws  apparently have been removed.    L1-L2 level unremarkable.    L2-L3 disc is narrowed there is mild spondylosis with minor facet  arthropathy present. There is some posterior paravertebral edema present  right greater than left extending from right facet lesion of L2-L3. There  is mild central stenosis present but the L2 nerve roots exit without  contact or distortion. There screw artifacts present in the pedicles at  L2 and L3-L4 and L5.    L3-L4 disc is narrowed there is mild spondylosis patient's had  laminectomy there is extensive para spinous muscular edema with fluid  along the left lamina and paraspinous region is also considerable loss of  fat plane within the paravertebral musculature of the spine from L1-L2  down to sacral region.    L4-L5 level is obliterated right laminectomy is present is extensive  edema and loss of fat planes within the paravertebral musculature and  around the posterior and lateral thecal sac.    The L5-S1 disc is intact there is facet arthropathy present.    Postcontrast study demonstrates diffuse enhancement of the  paravertebral  soft tissues starting at approximately the T12 and extending to the  sacral level.    There is diffuse enhancement of the L2-L3  disc with enhancing tissues of  the posterior paraspinous region and minor facet joint enhancement. There  is also enhancement of the posterior epidural region resulting in some  distortion of the dorsal lateral thecal sac, left greater than right.    The L3-L4 level demonstrates minimal central and left-sided enhancement  of the disc with extensive  enhancement of the paraspinous elements with  enhancement of the posterior and lateral epidural canal. No intradural  enhancement is noted. There is some enhancement around the right residual  facet . A portion of the left facet complex is absent..    The L4-L5 level again demonstrates comparable diffuse paraspinous  enhancement and enhancement surrounding the thecal sac within the central  canal. There is enhancement of the bony elements including the pedicles  and facet complex.    L5-S1 demonstrate some  facet enhancement and posterior perithecal  enhancement        IMPRESSION:  Patient's had extensive operative intervention with removal of  pedicle screws at L2-L3 L4 and L5. There is extensive enhancement of the  operative bed in the posterior epidural region as well is some  enhancement of the scar tissue around the thecal sac. There Is no  intradural or intramedullary enhancement although there is clumping of  the cauda and enhancement of the L2-L3 and L4-L5 disc. There is also  small amount enhancement of the posterior aspect of the L3-L4 disc.  enhancement consistent with gliosis. The various levels of bone  enhancement including the facet complex at L3 and L4 as well as portions  of the pedicles involving L5 involving and adjacent to the screw tracks..      09/25/19    X-Ray Lumbar Spine Complete 5 View    FINDINGS:  Scoliosis remains.  Vertebral body heights and alignment are stable.  Multilevel advanced degenerative disc height loss and osteophyte formation noted.  Multilevel facet arthropathy present more prevalent at the lower lumbar spine.  No acute  osseous abnormality appreciated.  Aorta iliac atherosclerotic vascular calcifications noted.    Impression  Similar appearance of the spine.  Correlate with MRI exam as clinically indicated.    08/26/14    X-Ray Cervical Spine AP And Lateral    Narrative  Findings: Vertebral alignment is normal.  There is narrowing of the disk spaces and  anterior osteophyte formation C 3-7.  There are no compression fractures or acute abnormalities are seen.  Carotid calcifications are noted bilaterally.  IMPRESSION:  Advanced degenerative change in the cervical spine.        ASSESSMENT: 81 y.o. year old male with lower back and right leg pain, consistent with     1. Failed back surgical syndrome  IR Epidural Transfor 1st Vert Lumbar Ed    Case Request-RAD/Other Procedure Area: Bilateral L3/4 TF XUAN with RN IV sedation   2. Lumbar radiculopathy, chronic  IR Epidural Transfor 1st Vert Lumbar Ed    Case Request-RAD/Other Procedure Area: Bilateral L3/4 TF XUAN with RN IV sedation   3. Lumbar spondylosis     4. DDD (degenerative disc disease), lumbar           PLAN:   - Interventions:   Schedule for bilateral L3/4 transforaminal epidural steroid injection to see if this helps with pain.  We discussed the procedure, benefits and risk in detail.  Patient has elected to pursue this procedure.    -patient is a candidate for spinal cord stimulation secondary to failed improvement in pain with numerous prior etiologies including pharmacologic management, physical therapy and prior medial branch block, epidural and caudal steroid injections.  Patient has been given educational booklet on spinal cord stimulation.    - Anticoagulation use: yes Plavix and Eliquis  Per ASA guidelines for secondary prophylaxis, patient can continue aspirin for will need to pause Eliquis 3 days prior to his transforaminal injection.  Will obtain cardiac clearance from Dr. Hood     report:  Reviewed and consistent with medication use as prescribed.      -  Medications:  Patient did not tolerate Lyrica titration.  We will pursue interventional treatment.    - Therapy:   We discussed continuing physical therapy to help manage the patient/s painful condition. The patient was counseled that muscle strengthening will improve the long term prognosis in regards to pain and may also help increase range of motion and mobility.       - Imaging: Reviewed available imaging with patient and answered any questions they had regarding study     - Follow up visit: return to clinic in 4-6 weeks      The above plan and management options were discussed at length with patient. Patient is in agreement with the above and verbalized understanding.    - I discussed the goals of interventional chronic pain management with the patient on today's visit. We discussed a multimodal and systematic approach to pain.  This includes diagnostic and therapeutic injections, adjuvant pharmacologic treatment, physical therapy, and at times psychiatry.  I emphasized the importance of regular exercise, core strengthening and stretching, diet and weight loss as a cornerstone of long-term pain management.    - This condition does not require this patient to take time off of work, and the primary goal of our Pain Management services is to improve the patient's functional capacity.  - Patient Questions: Answered all of the patient's questions regarding diagnoses, therapy, treatment and next steps        Philipp Demarco MD  Interventional Pain Management  [facility]    Disclaimer:  This note was prepared using voice recognition system and is likely to have sound alike errors that may have been overlooked even after proof reading.  Please call me with any questions

## 2022-05-06 ENCOUNTER — INFUSION (OUTPATIENT)
Dept: INFUSION THERAPY | Facility: HOSPITAL | Age: 82
End: 2022-05-06
Attending: NURSE PRACTITIONER
Payer: MEDICARE

## 2022-05-06 VITALS
DIASTOLIC BLOOD PRESSURE: 61 MMHG | TEMPERATURE: 97 F | SYSTOLIC BLOOD PRESSURE: 133 MMHG | OXYGEN SATURATION: 99 % | RESPIRATION RATE: 18 BRPM | HEART RATE: 95 BPM

## 2022-05-06 DIAGNOSIS — N18.4 ANEMIA ASSOCIATED WITH STAGE 4 CHRONIC RENAL FAILURE: Primary | ICD-10-CM

## 2022-05-06 DIAGNOSIS — D63.1 ANEMIA ASSOCIATED WITH STAGE 4 CHRONIC RENAL FAILURE: Primary | ICD-10-CM

## 2022-05-06 PROCEDURE — 63600175 PHARM REV CODE 636 W HCPCS: Mod: JG,EC | Performed by: NURSE PRACTITIONER

## 2022-05-06 PROCEDURE — 96372 THER/PROPH/DIAG INJ SC/IM: CPT

## 2022-05-06 RX ADMIN — EPOETIN ALFA-EPBX 40000 UNITS: 40000 INJECTION, SOLUTION INTRAVENOUS; SUBCUTANEOUS at 01:05

## 2022-05-08 ENCOUNTER — PATIENT MESSAGE (OUTPATIENT)
Dept: HEMATOLOGY/ONCOLOGY | Facility: CLINIC | Age: 82
End: 2022-05-08
Payer: MEDICARE

## 2022-05-10 ENCOUNTER — TELEPHONE (OUTPATIENT)
Dept: PAIN MEDICINE | Facility: CLINIC | Age: 82
End: 2022-05-10

## 2022-05-10 ENCOUNTER — OFFICE VISIT (OUTPATIENT)
Dept: PAIN MEDICINE | Facility: CLINIC | Age: 82
End: 2022-05-10
Payer: MEDICARE

## 2022-05-10 VITALS
DIASTOLIC BLOOD PRESSURE: 61 MMHG | BODY MASS INDEX: 29.06 KG/M2 | HEIGHT: 69 IN | SYSTOLIC BLOOD PRESSURE: 114 MMHG | HEART RATE: 85 BPM | WEIGHT: 196.19 LBS

## 2022-05-10 DIAGNOSIS — M96.1 FAILED BACK SURGICAL SYNDROME: Primary | ICD-10-CM

## 2022-05-10 DIAGNOSIS — M47.816 LUMBAR SPONDYLOSIS: ICD-10-CM

## 2022-05-10 DIAGNOSIS — M54.16 LUMBAR RADICULOPATHY, CHRONIC: ICD-10-CM

## 2022-05-10 DIAGNOSIS — M51.36 DDD (DEGENERATIVE DISC DISEASE), LUMBAR: ICD-10-CM

## 2022-05-10 PROCEDURE — 3072F LOW RISK FOR RETINOPATHY: CPT | Mod: CPTII,S$GLB,, | Performed by: ANESTHESIOLOGY

## 2022-05-10 PROCEDURE — 99214 PR OFFICE/OUTPT VISIT, EST, LEVL IV, 30-39 MIN: ICD-10-PCS | Mod: S$GLB,,, | Performed by: ANESTHESIOLOGY

## 2022-05-10 PROCEDURE — 3288F FALL RISK ASSESSMENT DOCD: CPT | Mod: CPTII,S$GLB,, | Performed by: ANESTHESIOLOGY

## 2022-05-10 PROCEDURE — 99999 PR PBB SHADOW E&M-EST. PATIENT-LVL IV: ICD-10-PCS | Mod: PBBFAC,,, | Performed by: ANESTHESIOLOGY

## 2022-05-10 PROCEDURE — 3074F SYST BP LT 130 MM HG: CPT | Mod: CPTII,S$GLB,, | Performed by: ANESTHESIOLOGY

## 2022-05-10 PROCEDURE — 1101F PT FALLS ASSESS-DOCD LE1/YR: CPT | Mod: CPTII,S$GLB,, | Performed by: ANESTHESIOLOGY

## 2022-05-10 PROCEDURE — 99214 OFFICE O/P EST MOD 30 MIN: CPT | Mod: S$GLB,,, | Performed by: ANESTHESIOLOGY

## 2022-05-10 PROCEDURE — 3078F PR MOST RECENT DIASTOLIC BLOOD PRESSURE < 80 MM HG: ICD-10-PCS | Mod: CPTII,S$GLB,, | Performed by: ANESTHESIOLOGY

## 2022-05-10 PROCEDURE — 3288F PR FALLS RISK ASSESSMENT DOCUMENTED: ICD-10-PCS | Mod: CPTII,S$GLB,, | Performed by: ANESTHESIOLOGY

## 2022-05-10 PROCEDURE — 1125F AMNT PAIN NOTED PAIN PRSNT: CPT | Mod: CPTII,S$GLB,, | Performed by: ANESTHESIOLOGY

## 2022-05-10 PROCEDURE — 3072F PR LOW RISK FOR RETINOPATHY: ICD-10-PCS | Mod: CPTII,S$GLB,, | Performed by: ANESTHESIOLOGY

## 2022-05-10 PROCEDURE — 3074F PR MOST RECENT SYSTOLIC BLOOD PRESSURE < 130 MM HG: ICD-10-PCS | Mod: CPTII,S$GLB,, | Performed by: ANESTHESIOLOGY

## 2022-05-10 PROCEDURE — 3078F DIAST BP <80 MM HG: CPT | Mod: CPTII,S$GLB,, | Performed by: ANESTHESIOLOGY

## 2022-05-10 PROCEDURE — 99999 PR PBB SHADOW E&M-EST. PATIENT-LVL IV: CPT | Mod: PBBFAC,,, | Performed by: ANESTHESIOLOGY

## 2022-05-10 PROCEDURE — 1159F PR MEDICATION LIST DOCUMENTED IN MEDICAL RECORD: ICD-10-PCS | Mod: CPTII,S$GLB,, | Performed by: ANESTHESIOLOGY

## 2022-05-10 PROCEDURE — 1101F PR PT FALLS ASSESS DOC 0-1 FALLS W/OUT INJ PAST YR: ICD-10-PCS | Mod: CPTII,S$GLB,, | Performed by: ANESTHESIOLOGY

## 2022-05-10 PROCEDURE — 1125F PR PAIN SEVERITY QUANTIFIED, PAIN PRESENT: ICD-10-PCS | Mod: CPTII,S$GLB,, | Performed by: ANESTHESIOLOGY

## 2022-05-10 PROCEDURE — 1159F MED LIST DOCD IN RCRD: CPT | Mod: CPTII,S$GLB,, | Performed by: ANESTHESIOLOGY

## 2022-05-10 NOTE — PATIENT INSTRUCTIONS
General Neck and Back Pain    Both neck and back pain are usually caused by injury to the muscles or ligaments of the spine. Sometimes the disks that separate each bone of the spine may cause pain by pressing on a nearby nerve. Back and neck pain may appear after a sudden twisting or bending force (such as in a car accident), or sometimes after a simple awkward movement. In either case, muscle spasm is often present and adds to the pain.  Acute neck and back pain usually gets better in 1 to 2 weeks. Pain related to disk disease, arthritis in the spinal joints or spinal stenosis (narrowing of the spinal canal) can become chronic and last for months or years.  Back and neck pain are common problems. Most people feel better in 1 or 2 weeks, and most of the rest in 1 to 2 months. Most people can remain active.  People experience and describe pain differently.  Pain can be sharp, stabbing, shooting, aching, cramping, or burning  Movement, standing, bending, lifting, sitting, or walking may worsen the pain  Pain can be localized to one spot or area, or it can be more generalized  Pain can spread or radiate upwards, downwards, to the front, or go down your arms  Muscle spasm may occur.  Most of the time mechanical problems with the muscles or spine cause the pain. it is usually caused by an injury, whether known or not, to the muscles or ligaments. While illnesses can cause back pain, it is usually not caused by a serious illness. Pain is usually related to physical activity, whether sports, exercise, work, or normal activity. Sometimes it can occur without an identifiable cause. This can happen simply by stretching or moving wrong, without noting pain at the time. Other causes include:  Overexertion, lifting, pushing, pulling incorrectly or too aggressively.  Sudden twisting, bending or stretching from an accident (car or fall), or accidental movement.  Poor posture  Poor conditioning, lack of regular exercise  Spinal  disc disease or arthritis  Stress  Pregnancy, or illness like appendicitis, bladder or kidney infection, pelvic infections   Home care  For neck pain: Use a comfortable pillow that supports the head and keeps the spine in a neutral position. The position of the head should not be tilted forward or backward.  When in bed, try to find a position of comfort. A firm mattress is best. Try lying flat on your back with pillows under your knees. You can also try lying on your side with your knees bent up towards your chest and a pillow between your knees.  At first, do not try to stretch out the sore spots. If there is a strain, it is not like the good soreness you get after exercising without an injury. In this case, stretching may make it worse.  Avoid prolonged sitting, long car rides or travel. This puts more stress on the lower back than standing or walking.  During the first 24 to 72 hours after an injury, apply an ice pack to the painful area for 20 minutes and then remove it for 20 minutes over a period of 60 to 90 minutes or several times a day.   You can alternate ice and heat therapies. Talk with your healthcare provider about the best treatment for your back or neck pain. As a safety precaution, do not use a heating pad at bedtime. Sleeping with a heating pad can lead to skin burns or tissue damage.  Therapeutic massage can help relax the back and neck muscles without stretching them.  Be aware of safe lifting methods and do not lift anything over 15 pounds until all the pain is gone.  Medications  Talk to your healthcare provider before using medicine, especially if you have other medical problems or are taking other medicines.  You may use over-the-counter medicine to control pain, unless another pain medicine was prescribed. If you have chronic conditions like diabetes, liver or kidney disease, stomach ulcers,  gastrointestinal bleeding, or are taking blood thinner medicines.  Be careful if you are given pain  medicines, narcotics, or medicine for muscle spasm. They can cause drowsiness, and can affect your coordination, reflexes, and judgment. Do not drive or operate heavy machinery.  Follow-up care  Follow up with your healthcare provider, or as advised. Physical therapy or further tests may be needed.  If X-rays were taken, you will be notified of any new findings that may affect your care.  Call 911  Seek emergency medical care if any of the following occur:  Trouble breathing  Confusion  Very drowsy or trouble awakening  Fainting or loss of consciousness  Rapid or very slow heart rate  Loss of bowel or bladder control  When to seek medical advice  Call your healthcare provider right away if any of these occur:  Pain becomes worse or spreads into your arms or legs  Weakness, numbness or pain in one or both arms or legs  Numbness in the groin area  Difficulty walking  Fever of 100.4ºF (38ºC) or higher, or as directed by your healthcare provider  Date Last Reviewed: 7/1/2016  © 6246-1663 [employer]. [address] [place] [address]. All rights reserved. This information is not intended as a substitute for professional medical care. Always follow your healthcare professional's instructions.       Understanding Lumbar Radiculopathy    Lumbar radiculopathy is irritation or inflammation of a nerve root in the low back. It causes symptoms that spread out from the back down one or both legs. To understand this condition, it helps to understand the parts of the spine:  Vertebrae. These are bones that stack to form the spine. The lumbar spine contains the 5 bottom vertebrae.  Disks. These are soft pads of tissue between the vertebrae. They act as shock absorbers for the spine.  Spinal canal. This is a tunnel formed within the stacked vertebrae. In the lumbar spine, nerves run through this canal.  Nerves. These branch off and leave the spinal canal, traveling out to parts of the body. As they leave the  spinal canal, nerves pass through openings between the vertebrae. The nerve root is the part of the nerve that is closest to the spinal canal.  Sciatic nerve. This is a large nerve formed from several nerve roots in the low back. This nerve extends down the back of the leg to the foot.  With lumbar radiculopathy, nerve roots in the low back become irritated. This leads to pain and symptoms. The sciatic nerve is commonly involved, so the condition is often called sciatica.  What causes lumbar radiculopathy?  Aging, injury, poor posture, extra body weight, and other issues can lead to problems in the low back. These problems may then irritate nerve roots. They include:  Damage to a disk in the lumbar spine. The damaged disk may then press on nearby nerve roots.  Degeneration from wear and tear, and aging. This can lead to narrowing (stenosis) of the openings between the vertebrae. The narrowed openings press on nerve roots as they leave the spinal canal.  Unstable spine. This is when a vertebra slips forward. It can then press on a nerve root.  Other, less common things can put pressure on nerves in the low back. These include diabetes, infection, or a tumor.  Symptoms of lumbar radiculopathy  These include:  Pain in the low back  Pain, numbness, tingling, or weakness that travels into the buttocks, hip, groin, or leg  Muscle spasms  Treatment for lumbar radiculopathy  In most cases, your healthcare provider will first try treatments that help relieve symptoms. These may include:  Prescription and over-the-counter pain medicines. These help relieve pain, swelling, and irritation.  Limits on positions and activities that increase pain. But lying in bed or avoiding all movement is only recommended for a short period of time.  Physical therapy, including exercises and stretches. This helps decrease pain and increase movement and function.  Steroid shots into the lower back. This may help relieve symptoms for a  time.  Weight-loss program. If you are overweight, losing extra pounds may help relieve symptoms.  In some cases, you may need surgery to fix the underlying problem. This depends on the cause, the symptoms, and how long the pain has lasted.  Possible complications  Over time, an irritated and inflamed nerve may become damaged. This may lead to long-lasting (permanent) numbness or weakness in your legs and feet. If symptoms change suddenly or get worse, be sure to let your healthcare provider know.  When to call your healthcare provider  Call your healthcare provider right away if you have any of these:  New pain or pain that gets worse  New or increasing weakness, tingling, or numbness in your leg or foot  Problems controlling your bladder or bowel   Date Last Reviewed: 3/10/2016  © 0990-4185 [employer]. [address] [place] [address], [place] [address]. All rights reserved. This information is not intended as a substitute for professional medical care. Always follow your healthcare professional's instructions.       Exercises to Strengthen Your Lower Back  Strong lower back and abdominal muscles work together to support your spine. The exercises below will help strengthen the lower back. It is important that you begin exercising slowly and increase levels gradually.  Always begin any exercise program with stretching. If you feel pain while doing any of these exercises, stop and talk to your doctor about a more specific exercise program that better suits your condition.   Low back stretch  The point of stretching is to make you more flexible and increase your range of motion. Stretch only as much as you are able. Stretch slowly. Do not push your stretch to the limit. If at any point you feel pain while stretching, this is your (temporary) limit.  Lie on your back with your knees bent and both feet on the ground.  Slowly raise your left knee to your chest as you flatten your lower back against the floor. Hold  for 5 seconds.  Relax and repeat the exercise with your right knee.  Do 10 of these exercises for each leg.  Repeat hugging both knees to your chest at the same time.  Building lower back strength  Start your exercise routine with 10 to 30 minutes a day, 1 to 3 times a day.  Initial exercises  Lying on your back:  Ankle pumps: Move your foot up and down, towards your head, and then away. Repeat 10 times with each foot.  Heel slides: Slowly bend your knee, drawing the heel of your foot towards you. Then slide your heel/foot from you, straightening your knee. Do not lift your foot off the floor (this is not a leg lift).  Abdominal contraction: Bend your knees and put your hands on your stomach. Tighten your stomach muscles. Hold for 5 seconds, then relax. Repeat 10 times.  Straight leg raise: Bend one leg at the knee and keep the other leg straight. Tighten your stomach muscles. Slowly lift your straight leg 6 to 12 inches off the floor and hold for up to 5 seconds. Repeat 10 times on each side.  Standing:  Wall squats: Stand with your back against the wall. Move your feet about 12 inches away from the wall. Tighten your stomach muscles, and slowly bend your knees until they are at about a 45 degree angle. Do not go down too far. Hold about 5 seconds. Then slowly return to your starting position. Repeat 10 times.  Heel raises: Stand facing the wall. Slowly raise the heels of your feet up and down, while keeping your toes on the floor. If you have trouble balancing, you can touch the wall with your hands. Repeat 10 times.  More advanced exercises  When you feel comfortable enough, try these exercises.  Kneeling lumbar extension: Begin on your hands and knees. At the same time, raise and straighten your right arm and left leg until they are parallel to the ground. Hold for 2 seconds and come back slowly to a starting position. Repeat with left arm and right leg, alternating 10 times.  Prone lumbar extension: Lie face  down, arms extended overhead, palms on the floor. At the same time, raise your right arm and left leg as high as comfortably possible. Hold for 10 seconds and slowly return to start. Repeat with left arm and right leg, alternating 10 times. Gradually build up to 20 times. (Advanced: Repeat this exercise raising both arms and both legs a few inches off the floor at the same time. Hold for 5 seconds and release.)  Pelvic tilt: Lie on the floor on your back with your knees bent at 90 degrees. Your feet should be flat on the floor. Inhale, exhale, then slowly contract your abdominal muscles bringing your navel toward your spine. Let your pelvis rock back until your lower back is flat on the floor. Hold for 10 seconds while breathing smoothly.  Abdominal crunch: Perform a pelvic tilt (above) flattening your lower back against the floor. Holding the tension in your abdominal muscles, take another breath and raise your shoulder blades off the ground (this is not a full sit-up). Keep your head in line with your body (dont bend your neck forward). Hold for 2 seconds, then slowly lower.  Date Last Reviewed: 6/1/2016  © 5318-0996 [employer]. [address] [place] [address], [place] [address]. All rights reserved. This information is not intended as a substitute for professional medical care. Always follow your healthcare professional's instructions.

## 2022-05-10 NOTE — TELEPHONE ENCOUNTER
MO        Preferred Name:   Jake Ortiz    Male, 81 y.o., 1940  Phone:   [phone] (H)  PCP:   Byron Stevens MD  Language:   English  Need Interp:   No      RE: Surgery Clearance  Received: Today  MD Sydnee Soni LPN    OK sure he can hold Eliquis 3 days prior and resume post op. Thanks     - Dr. Hood             Previous Messages       ----- Message -----   From: Sydnee Louis LPN   Sent: 5/10/2022   1:16 PM CDT   To: Phillip Hood MD   Subject: Surgery Clearance                                 Good afternoon, Dr. Philipp Demarco is wanting to perform a Lumbar TF DANYELLE L4/5 so she is wanting the patient to hold Eliquis for 3 days , but he can stay on Aspirin.  Please advise

## 2022-05-11 ENCOUNTER — LAB VISIT (OUTPATIENT)
Dept: LAB | Facility: HOSPITAL | Age: 82
End: 2022-05-11
Attending: NURSE PRACTITIONER
Payer: MEDICARE

## 2022-05-11 ENCOUNTER — OFFICE VISIT (OUTPATIENT)
Dept: TRANSPLANT | Facility: CLINIC | Age: 82
End: 2022-05-11
Payer: MEDICARE

## 2022-05-11 ENCOUNTER — PATIENT MESSAGE (OUTPATIENT)
Dept: CARDIOLOGY | Facility: CLINIC | Age: 82
End: 2022-05-11
Payer: MEDICARE

## 2022-05-11 VITALS
OXYGEN SATURATION: 98 % | BODY MASS INDEX: 28.57 KG/M2 | HEIGHT: 69 IN | SYSTOLIC BLOOD PRESSURE: 120 MMHG | DIASTOLIC BLOOD PRESSURE: 54 MMHG | HEART RATE: 86 BPM | WEIGHT: 192.88 LBS

## 2022-05-11 DIAGNOSIS — I48.21 PERMANENT ATRIAL FIBRILLATION: ICD-10-CM

## 2022-05-11 DIAGNOSIS — D64.9 SYMPTOMATIC ANEMIA: ICD-10-CM

## 2022-05-11 DIAGNOSIS — G47.33 OBSTRUCTIVE SLEEP APNEA: ICD-10-CM

## 2022-05-11 DIAGNOSIS — I50.43 ACUTE ON CHRONIC COMBINED SYSTOLIC AND DIASTOLIC CONGESTIVE HEART FAILURE: Primary | ICD-10-CM

## 2022-05-11 DIAGNOSIS — I51.7 CARDIOMEGALY: ICD-10-CM

## 2022-05-11 DIAGNOSIS — E66.9 DIABETES MELLITUS TYPE 2 IN OBESE: ICD-10-CM

## 2022-05-11 DIAGNOSIS — Z95.0 PRESENCE OF CARDIAC RESYNCHRONIZATION THERAPY PACEMAKER (CRT-P): ICD-10-CM

## 2022-05-11 DIAGNOSIS — I50.43 ACUTE ON CHRONIC COMBINED SYSTOLIC AND DIASTOLIC CONGESTIVE HEART FAILURE: ICD-10-CM

## 2022-05-11 DIAGNOSIS — E11.69 DIABETES MELLITUS TYPE 2 IN OBESE: ICD-10-CM

## 2022-05-11 DIAGNOSIS — E78.2 MIXED HYPERLIPIDEMIA: ICD-10-CM

## 2022-05-11 DIAGNOSIS — I10 PRIMARY HYPERTENSION: ICD-10-CM

## 2022-05-11 LAB
ANION GAP SERPL CALC-SCNC: 11 MMOL/L (ref 8–16)
BNP SERPL-MCNC: 276 PG/ML (ref 0–99)
BUN SERPL-MCNC: 15 MG/DL (ref 8–23)
CALCIUM SERPL-MCNC: 9.6 MG/DL (ref 8.7–10.5)
CHLORIDE SERPL-SCNC: 104 MMOL/L (ref 95–110)
CO2 SERPL-SCNC: 28 MMOL/L (ref 23–29)
CREAT SERPL-MCNC: 1.5 MG/DL (ref 0.5–1.4)
EST. GFR  (AFRICAN AMERICAN): 50 ML/MIN/1.73 M^2
EST. GFR  (NON AFRICAN AMERICAN): 44 ML/MIN/1.73 M^2
GLUCOSE SERPL-MCNC: 97 MG/DL (ref 70–110)
POTASSIUM SERPL-SCNC: 3.9 MMOL/L (ref 3.5–5.1)
SODIUM SERPL-SCNC: 143 MMOL/L (ref 136–145)

## 2022-05-11 PROCEDURE — 99999 PR PBB SHADOW E&M-EST. PATIENT-LVL V: CPT | Mod: PBBFAC,,, | Performed by: NURSE PRACTITIONER

## 2022-05-11 PROCEDURE — 36415 COLL VENOUS BLD VENIPUNCTURE: CPT | Performed by: NURSE PRACTITIONER

## 2022-05-11 PROCEDURE — 3072F LOW RISK FOR RETINOPATHY: CPT | Mod: CPTII,S$GLB,, | Performed by: NURSE PRACTITIONER

## 2022-05-11 PROCEDURE — 99214 PR OFFICE/OUTPT VISIT, EST, LEVL IV, 30-39 MIN: ICD-10-PCS | Mod: S$GLB,,, | Performed by: NURSE PRACTITIONER

## 2022-05-11 PROCEDURE — 1126F PR PAIN SEVERITY QUANTIFIED, NO PAIN PRESENT: ICD-10-PCS | Mod: CPTII,S$GLB,, | Performed by: NURSE PRACTITIONER

## 2022-05-11 PROCEDURE — 1159F MED LIST DOCD IN RCRD: CPT | Mod: CPTII,S$GLB,, | Performed by: NURSE PRACTITIONER

## 2022-05-11 PROCEDURE — 1159F PR MEDICATION LIST DOCUMENTED IN MEDICAL RECORD: ICD-10-PCS | Mod: CPTII,S$GLB,, | Performed by: NURSE PRACTITIONER

## 2022-05-11 PROCEDURE — 3078F PR MOST RECENT DIASTOLIC BLOOD PRESSURE < 80 MM HG: ICD-10-PCS | Mod: CPTII,S$GLB,, | Performed by: NURSE PRACTITIONER

## 2022-05-11 PROCEDURE — 99999 PR PBB SHADOW E&M-EST. PATIENT-LVL V: ICD-10-PCS | Mod: PBBFAC,,, | Performed by: NURSE PRACTITIONER

## 2022-05-11 PROCEDURE — 3072F PR LOW RISK FOR RETINOPATHY: ICD-10-PCS | Mod: CPTII,S$GLB,, | Performed by: NURSE PRACTITIONER

## 2022-05-11 PROCEDURE — 3074F PR MOST RECENT SYSTOLIC BLOOD PRESSURE < 130 MM HG: ICD-10-PCS | Mod: CPTII,S$GLB,, | Performed by: NURSE PRACTITIONER

## 2022-05-11 PROCEDURE — 3074F SYST BP LT 130 MM HG: CPT | Mod: CPTII,S$GLB,, | Performed by: NURSE PRACTITIONER

## 2022-05-11 PROCEDURE — 80048 BASIC METABOLIC PNL TOTAL CA: CPT | Performed by: NURSE PRACTITIONER

## 2022-05-11 PROCEDURE — 83880 ASSAY OF NATRIURETIC PEPTIDE: CPT | Performed by: NURSE PRACTITIONER

## 2022-05-11 PROCEDURE — 3078F DIAST BP <80 MM HG: CPT | Mod: CPTII,S$GLB,, | Performed by: NURSE PRACTITIONER

## 2022-05-11 PROCEDURE — 99214 OFFICE O/P EST MOD 30 MIN: CPT | Mod: S$GLB,,, | Performed by: NURSE PRACTITIONER

## 2022-05-11 PROCEDURE — 1126F AMNT PAIN NOTED NONE PRSNT: CPT | Mod: CPTII,S$GLB,, | Performed by: NURSE PRACTITIONER

## 2022-05-11 NOTE — PROGRESS NOTES
Subjective:   Patient ID:  Jake Ortiz is a 81 y.o. male who presents for evaluation of Congestive Heart Failure      HPI    Jake Ortiz is a 81 year old male who presents to CHF clinic for hospital follow up.     His current medical conditions include CAD s/p CABG, old MI, HFpEF with TR/MR, AVB s/p CRT, PAF on Eliquis, Anemia, HTN, HLP, DM Type II, CKD Stage IV.     He was recently admitted due to shortness of breath with LE edema and weight gain over the previous few days.     ED workup revealed , H/H 8/29. He reported recent dietary indiscretion with boiled crawfish. Repeat ECHO revealed EF 35%, Grade III/IV DD, Mild RVE with reduced RV function. He returns today and states he is doing ok.     Has lost from 219>>192 pounds since hospital discharge. He is doing a much better job since hospital discharge with diet and fluid restrictions. Previously, he was not following any restrictions.       Denies chest pain or anginal equivalents. No shortness of breath, KING or palpitations. Denies orthopnea, PND or abdominal bloating. Reports regular walking without any issues lately. NO leg swelling or claudications. No recent falls, syncope or near syncopal events. Reports compliance with medications and dietary restrictions. NO CNS complaints to suggest TIA or CVA today. No signs of abnormal bleeding on Eliquis, ASA.       He is still very limited with physical activity due to shortness of breath after a few feet. So he is very sedentary due to this. Today, He is NYHA FC III.     Past Medical History:   Diagnosis Date    Abnormal PFT     Anemia     Arthritis     Atrial fibrillation 10/19/2017    Back pain     Congestive heart failure 3/5/2018    Coronary artery disease     DM (diabetes mellitus) 2018     am 06/23/2020    DM (diabetes mellitus) 2016     am 08/17/2021    Hyperlipemia     Hypertension     Myocardial infarction     Obesity     NASREEN (obstructive sleep apnea) 6/5/2018     Prostate cancer 2015    Tobacco dependence     Type 2 diabetes mellitus        Past Surgical History:   Procedure Laterality Date    COLONOSCOPY N/A 9/22/2020    Procedure: COLONOSCOPY;  Surgeon: Javier Farmer MD;  Location: [facility] ENDO;  Service: Endoscopy;  Laterality: N/A;    CORONARY ARTERY BYPASS GRAFT  1987    EPIDURAL STEROID INJECTION N/A 11/22/2019    Procedure: Lumbar L5/S1 IL XUAN;  Surgeon: Tacho Trivedi MD;  [facility];  Service: Pain Management;  Laterality: N/A;    EPIDURAL STEROID INJECTION N/A 1/6/2020    Procedure: Lumbar L5/S1 IL XUAN;  Surgeon: Tacho Trivedi MD;  Location: [facility];  Service: Pain Management;  Laterality: N/A;    EPIDURAL STEROID INJECTION N/A 2/10/2020    Procedure: Caudal XUAN;  Surgeon: Tacho Trivedi MD;  Location: [facility] PAIN MGT;  Service: Pain Management;  Laterality: N/A;    ESOPHAGOGASTRODUODENOSCOPY N/A 9/22/2020    Procedure: EGD (ESOPHAGOGASTRODUODENOSCOPY);  Surgeon: Javier Farmer MD;  Location: [facility];  Service: Endoscopy;  Laterality: N/A;    INJECTION OF ANESTHETIC AGENT AROUND MEDIAL BRANCH NERVES INNERVATING LUMBAR FACET JOINT Bilateral 10/12/2020    Procedure: Bilateral L3-5 MBB;  Surgeon: Tacho Trivedi MD;  Location: [facility] PAIN MGT;  Service: Pain Management;  Laterality: Bilateral;    INJECTION OF ANESTHETIC AGENT AROUND MEDIAL BRANCH NERVES INNERVATING LUMBAR FACET JOINT Bilateral 12/21/2020    Procedure: Bilateral L3-5 MBB with steroid;  Surgeon: Tacho Trivedi MD;  Location: [facility] PAIN MGT;  Service: Pain Management;  Laterality: Bilateral;    INTRALUMINAL GASTROINTESTINAL TRACT IMAGING VIA CAPSULE N/A 12/29/2021    Procedure: IMAGING PROCEDURE, GI TRACT, INTRALUMINAL, VIA CAPSULE;  Surgeon: Tahir Geronimo RN;  Location: [facility] ENDO;  Service: Endoscopy;  Laterality: N/A;    PROSTATE SURGERY      prostate radiation    RADIOFREQUENCY THERMOCOAGULATION Left 6/4/2021    Procedure: Left L3-5 Lumbar  RFA;  Surgeon: Tacho Trivedi MD;  Location: [facility] PAIN MGT;  Service: Pain Management;  Laterality: Left;    RADIOFREQUENCY THERMOCOAGULATION Right 2021    Procedure: Right L3-5 Lumbar RFA;  Surgeon: Tacho Trivedi MD;  Location: [facility] PAIN MGT;  Service: Pain Management;  Laterality: Right;    SPINE SURGERY      fusion    TONSILLECTOMY         Social History     Tobacco Use    Smoking status: Former Smoker     Packs/day: 1.50     Years: 20.00     Pack years: 30.00     Types: Cigarettes     Start date:      Quit date:      Years since quittin.3    Smokeless tobacco: Never Used   Substance Use Topics    Alcohol use: No    Drug use: No       Family History   Problem Relation Age of Onset    Heart attack Mother     Diabetes Mother     Heart disease Mother     Cataracts Mother     Stroke Father     Heart disease Father     Heart disease Brother      Wt Readings from Last 3 Encounters:   22 87.5 kg (192 lb 14.4 oz)   05/10/22 89 kg (196 lb 3.4 oz)   22 94.4 kg (208 lb 1.8 oz)     Temp Readings from Last 3 Encounters:   22 97.3 °F (36.3 °C)   22 97.5 °F (36.4 °C)   22 97.5 °F (36.4 °C) (Oral)     BP Readings from Last 3 Encounters:   22 (!) 120/54   05/10/22 114/61   22 133/61     Pulse Readings from Last 3 Encounters:   22 86   05/10/22 85   22 95     Current Outpatient Medications on File Prior to Visit   Medication Sig Dispense Refill    acetaminophen (TYLENOL) 500 MG tablet Take 2 tablets (1,000 mg total) by mouth every 6 (six) hours as needed for Pain.  0    apixaban (ELIQUIS) 2.5 mg Tab Take 1 tablet (2.5 mg total) by mouth 2 (two) times daily. 180 tablet 1    aspirin 81 MG Chew Take 1 tablet (81 mg total) by mouth once daily. 30 tablet 0    atorvastatin (LIPITOR) 80 MG tablet One tablet daily (Patient taking differently: Take 80 mg by mouth every evening.) 90 tablet 1    cholecalciferol, vitamin D3, (VITAMIN D3) 25 mcg  (1,000 unit) capsule Take 1,000 Units by mouth once daily.      clonazePAM (KLONOPIN) 1 MG tablet Take 1 tablet (1 mg total) by mouth nightly as needed for Anxiety. 90 tablet 0    fluticasone propionate (FLONASE) 50 mcg/actuation nasal spray 2 sprays (100 mcg total) by Each Nostril route once daily. 48 g 5    furosemide (LASIX) 40 MG tablet Take 1 tablet (40 mg total) by mouth 2 (two) times daily. Can double to 2 tablets twice a day for 3 days for more swelling/fluid build up - take 80mg twice a day 90 tablet 1    krill/om-3/dha/epa/phospho/ast (MEGARED OMEGA-3 KRILL OIL ORAL) Take 1 capsule by mouth every morning.      levocetirizine (XYZAL) 5 MG tablet TAKE 1 TABLET EVERY EVENING 90 tablet 1    losartan (COZAAR) 50 MG tablet Take 1 tablet (50 mg total) by mouth once daily. 90 tablet 3    magnesium oxide (MAG-OX) 400 mg (241.3 mg magnesium) tablet Take 1 tablet (400 mg total) by mouth once daily. 90 tablet 1    multivitamin capsule Take 1 capsule by mouth once daily.      pantoprazole (PROTONIX) 40 MG tablet Take 1 tablet (40 mg total) by mouth once daily. 90 tablet 3    potassium chloride SA (K-DUR,KLOR-CON) 20 MEQ tablet Take 1 tablet (20 mEq total) by mouth once daily. 90 tablet 1    spironolactone (ALDACTONE) 25 MG tablet Take 1 tablet (25 mg total) by mouth once daily. 90 tablet 1    vit A/vit C/vit E/zinc/copper (OCUVITE PRESERVISION ORAL) Take 1 capsule by mouth every evening.      [DISCONTINUED] traMADoL (ULTRAM) 50 mg tablet Take 12.5 mg by mouth every 6 (six) hours as needed for Pain.      blood sugar diagnostic Strp Check blood glucose levels daily in the AM fasting and 1-2 times more daily. 300 strip 3    lancets Misc Check blood glucose levels daily in the AM fasting and 1-2 times more daily. 300 each 3     Current Facility-Administered Medications on File Prior to Visit   Medication Dose Route Frequency Provider Last Rate Last Admin    ondansetron injection 4 mg  4 mg Intravenous Once  "KHALIF Trivedi MD           Review of Systems   Constitutional: Positive for malaise/fatigue.   HENT: Negative for hearing loss and hoarse voice.    Eyes: Negative for blurred vision and visual disturbance.   Cardiovascular: Positive for dyspnea on exertion and irregular heartbeat. Negative for chest pain, claudication, leg swelling, near-syncope, orthopnea, palpitations, paroxysmal nocturnal dyspnea and syncope.   Respiratory: Negative for cough, hemoptysis, shortness of breath, sleep disturbances due to breathing, snoring and wheezing.    Endocrine: Negative for cold intolerance and heat intolerance.   Hematologic/Lymphatic: Bruises/bleeds easily.   Skin: Negative for color change, dry skin and nail changes.   Musculoskeletal: Positive for arthritis and back pain. Negative for joint pain and myalgias.   Gastrointestinal: Negative for bloating, abdominal pain, constipation, nausea and vomiting.   Genitourinary: Negative for dysuria, flank pain, hematuria and hesitancy.   Neurological: Negative for headaches, light-headedness, loss of balance, numbness, paresthesias and weakness.   Psychiatric/Behavioral: Negative for altered mental status.   Allergic/Immunologic: Negative for environmental allergies.     BP (!) 120/54 (BP Location: Right arm, Patient Position: Sitting)   Pulse 86   Ht 5' 9" (1.753 m)   Wt 87.5 kg (192 lb 14.4 oz)   SpO2 98%   BMI 28.49 kg/m²     Objective:   Physical Exam  Vitals and nursing note reviewed.   Constitutional:       General: He is not in acute distress.     Appearance: Normal appearance. He is well-developed. He is not ill-appearing.   HENT:      Head: Normocephalic and atraumatic.      Nose: Nose normal.      Mouth/Throat:      Mouth: Mucous membranes are moist.   Eyes:      Pupils: Pupils are equal, round, and reactive to light.   Neck:      Thyroid: No thyromegaly.      Vascular: No JVD.      Trachea: No tracheal deviation.   Cardiovascular:      Rate and Rhythm: Normal " rate and regular rhythm.      Chest Wall: PMI is not displaced.      Pulses: Intact distal pulses.           Radial pulses are 2+ on the right side and 2+ on the left side.        Dorsalis pedis pulses are 2+ on the right side and 2+ on the left side.      Heart sounds: S1 normal and S2 normal. Heart sounds not distant. No murmur heard.     Comments: S/p CRT-P in excellent repair  Pulmonary:      Effort: Pulmonary effort is normal. No respiratory distress.      Breath sounds: Normal breath sounds. No wheezing.   Abdominal:      General: Bowel sounds are normal. There is no distension.      Palpations: Abdomen is soft.      Tenderness: There is no abdominal tenderness.   Musculoskeletal:         General: No swelling. Normal range of motion.      Cervical back: Full passive range of motion without pain, normal range of motion and neck supple.      Right ankle: No swelling.      Left ankle: No swelling.   Skin:     General: Skin is warm and dry.      Capillary Refill: Capillary refill takes less than 2 seconds.      Nails: There is no clubbing.   Neurological:      General: No focal deficit present.      Mental Status: He is alert and oriented to person, place, and time. Mental status is at baseline.   Psychiatric:         Mood and Affect: Mood normal.         Speech: Speech normal.         Behavior: Behavior normal.         Thought Content: Thought content normal.         Judgment: Judgment normal.         Lab Results   Component Value Date    CHOL 126 07/07/2021    CHOL 171 03/20/2020    CHOL 174 09/20/2019     Lab Results   Component Value Date    HDL 38 (L) 07/07/2021    HDL 38 (L) 03/20/2020    HDL 45 09/20/2019     Lab Results   Component Value Date    LDLCALC 52.4 (L) 07/07/2021    LDLCALC 75.0 03/20/2020    LDLCALC 87.8 09/20/2019     Lab Results   Component Value Date    TRIG 178 (H) 07/07/2021    TRIG 290 (H) 03/20/2020    TRIG 206 (H) 09/20/2019     Lab Results   Component Value Date    CHOLHDL 30.2  07/07/2021    CHOLHDL 22.2 03/20/2020    CHOLHDL 25.9 09/20/2019       Chemistry        Component Value Date/Time     04/30/2022 0646    K 4.5 04/30/2022 0646     (H) 04/30/2022 0646    CO2 21 (L) 04/30/2022 0646    BUN 16 04/30/2022 0646    CREATININE 1.4 04/30/2022 0646    GLU 86 04/30/2022 0646        Component Value Date/Time    CALCIUM 8.7 04/30/2022 0646    ALKPHOS 81 04/29/2022 0351    AST 17 04/29/2022 0351    ALT 12 04/29/2022 0351    BILITOT 0.5 04/29/2022 0351    ESTGFRAFRICA 55 (A) 04/30/2022 0646    EGFRNONAA 47 (A) 04/30/2022 0646          Lab Results   Component Value Date    TSH 1.268 11/04/2020     Lab Results   Component Value Date    INR 1.2 11/01/2020    INR 1.2 10/23/2020    INR 1.2 09/18/2020     Lab Results   Component Value Date    WBC 3.62 (L) 04/30/2022    HGB 8.7 (L) 04/30/2022    HCT 31.1 (L) 04/30/2022     (H) 04/30/2022     04/30/2022     1. TTE  Results for orders placed during the hospital encounter of 04/28/22    Echo    Interpretation Summary  · The left ventricle is normal in size with concentric hypertrophy and moderately decreased systolic function.  · The estimated ejection fraction is 35%.  · Grade III left ventricular diastolic dysfunction.  · Mild right ventricular enlargement with moderately reduced right ventricular systolic function.  · Mild left atrial enlargement.  · Mild right atrial enlargement.  · There is mild aortic valve stenosis.  · Aortic valve area is 1.53 cm2; peak velocity is 1.78 m/s; mean gradient is 7 mmHg.  · Mild mitral regurgitation.  · Mild tricuspid regurgitation.  · Elevated central venous pressure (15 mmHg).  · The estimated PA systolic pressure is 37 mmHg.  · There is abnormal septal wall motion. There is systolic and diastolic flattening of the interventricular septum consistent with right ventricle pressure and volume overload.  · There is moderate left ventricular global hypokinesis.     Assessment:      1. Acute on  chronic combined systolic and diastolic congestive heart failure    2. Permanent atrial fibrillation    3. Cardiomegaly    4. Primary hypertension    5. Mixed hyperlipidemia    6. Presence of cardiac resynchronization therapy pacemaker (CRT-P)    7. Symptomatic anemia    8. Diabetes mellitus type 2 in obese    9. Obstructive sleep apnea        Plan:     Obtain BMP, BNP today  Med adjustments pending labs today  CHF education provided to patient today in office  CHF nurse line direct number provided today as well  RTC in 2 weeks for CHF mgmt.     CHF SYNOPSIS:    GDMT:  ACE/ARB/ARNI: Losartan 50 mg BID  Beta Blocker: none  MRA: Aldactone 25 mg daily  SGLT2: none yet  Diuretic: Lasix 40 mg BID    Last BMP:  Next BMP Due:today pending    Last BNP:  Next BNP Due: today pending    Last Echo:4/2022  Repeat Echo Due:3 months post med optimization    Nicole May, FNP-C [facility]

## 2022-05-17 RX ORDER — POTASSIUM CHLORIDE 20 MEQ/1
20 TABLET, EXTENDED RELEASE ORAL DAILY
Qty: 90 TABLET | Refills: 1 | Status: SHIPPED | OUTPATIENT
Start: 2022-05-17 | End: 2023-01-01 | Stop reason: SDUPTHER

## 2022-05-18 ENCOUNTER — LAB VISIT (OUTPATIENT)
Dept: LAB | Facility: HOSPITAL | Age: 82
End: 2022-05-18
Attending: FAMILY MEDICINE
Payer: MEDICARE

## 2022-05-18 DIAGNOSIS — D63.1 ANEMIA ASSOCIATED WITH STAGE 4 CHRONIC RENAL FAILURE: ICD-10-CM

## 2022-05-18 DIAGNOSIS — N18.4 ANEMIA ASSOCIATED WITH STAGE 4 CHRONIC RENAL FAILURE: ICD-10-CM

## 2022-05-18 LAB
ALBUMIN SERPL BCP-MCNC: 3.9 G/DL (ref 3.5–5.2)
ALP SERPL-CCNC: 82 U/L (ref 55–135)
ALT SERPL W/O P-5'-P-CCNC: 10 U/L (ref 10–44)
ANION GAP SERPL CALC-SCNC: 12 MMOL/L (ref 8–16)
AST SERPL-CCNC: 24 U/L (ref 10–40)
BASOPHILS # BLD AUTO: 0.07 K/UL (ref 0–0.2)
BASOPHILS NFR BLD: 1.5 % (ref 0–1.9)
BILIRUB SERPL-MCNC: 0.4 MG/DL (ref 0.1–1)
BUN SERPL-MCNC: 27 MG/DL (ref 8–23)
CALCIUM SERPL-MCNC: 8.9 MG/DL (ref 8.7–10.5)
CHLORIDE SERPL-SCNC: 103 MMOL/L (ref 95–110)
CO2 SERPL-SCNC: 23 MMOL/L (ref 23–29)
CREAT SERPL-MCNC: 1.7 MG/DL (ref 0.5–1.4)
DIFFERENTIAL METHOD: ABNORMAL
EOSINOPHIL # BLD AUTO: 0.2 K/UL (ref 0–0.5)
EOSINOPHIL NFR BLD: 3.7 % (ref 0–8)
ERYTHROCYTE [DISTWIDTH] IN BLOOD BY AUTOMATED COUNT: 18.1 % (ref 11.5–14.5)
EST. GFR  (AFRICAN AMERICAN): 43 ML/MIN/1.73 M^2
EST. GFR  (NON AFRICAN AMERICAN): 38 ML/MIN/1.73 M^2
GLUCOSE SERPL-MCNC: 163 MG/DL (ref 70–110)
HCT VFR BLD AUTO: 31.8 % (ref 40–54)
HGB BLD-MCNC: 9.4 G/DL (ref 14–18)
IMM GRANULOCYTES # BLD AUTO: 0.01 K/UL (ref 0–0.04)
IMM GRANULOCYTES NFR BLD AUTO: 0.2 % (ref 0–0.5)
LYMPHOCYTES # BLD AUTO: 0.5 K/UL (ref 1–4.8)
LYMPHOCYTES NFR BLD: 9.8 % (ref 18–48)
MCH RBC QN AUTO: 30.5 PG (ref 27–31)
MCHC RBC AUTO-ENTMCNC: 29.6 G/DL (ref 32–36)
MCV RBC AUTO: 103 FL (ref 82–98)
MONOCYTES # BLD AUTO: 0.5 K/UL (ref 0.3–1)
MONOCYTES NFR BLD: 10.6 % (ref 4–15)
NEUTROPHILS # BLD AUTO: 3.6 K/UL (ref 1.8–7.7)
NEUTROPHILS NFR BLD: 74.2 % (ref 38–73)
NRBC BLD-RTO: 0 /100 WBC
PLATELET # BLD AUTO: 361 K/UL (ref 150–450)
PMV BLD AUTO: 9.2 FL (ref 9.2–12.9)
POTASSIUM SERPL-SCNC: 4.6 MMOL/L (ref 3.5–5.1)
PROT SERPL-MCNC: 6.9 G/DL (ref 6–8.4)
RBC # BLD AUTO: 3.08 M/UL (ref 4.6–6.2)
SODIUM SERPL-SCNC: 138 MMOL/L (ref 136–145)
WBC # BLD AUTO: 4.82 K/UL (ref 3.9–12.7)

## 2022-05-18 PROCEDURE — 36415 COLL VENOUS BLD VENIPUNCTURE: CPT | Mod: PO | Performed by: NURSE PRACTITIONER

## 2022-05-18 PROCEDURE — 80053 COMPREHEN METABOLIC PANEL: CPT | Performed by: NURSE PRACTITIONER

## 2022-05-18 PROCEDURE — 85025 COMPLETE CBC W/AUTO DIFF WBC: CPT | Performed by: NURSE PRACTITIONER

## 2022-05-23 ENCOUNTER — TELEPHONE (OUTPATIENT)
Dept: PAIN MEDICINE | Facility: CLINIC | Age: 82
End: 2022-05-23
Payer: MEDICARE

## 2022-05-23 ENCOUNTER — PATIENT MESSAGE (OUTPATIENT)
Dept: PAIN MEDICINE | Facility: CLINIC | Age: 82
End: 2022-05-23
Payer: MEDICARE

## 2022-05-23 NOTE — TELEPHONE ENCOUNTER
Procedure scheduled for 6/8/22 with Dr. Demarco and injection follow up 7/6/22 @ 9:40 at [facility]. Pre procedure instructions sent to patient my chart portal.

## 2022-05-30 ENCOUNTER — PATIENT MESSAGE (OUTPATIENT)
Dept: CARDIOLOGY | Facility: CLINIC | Age: 82
End: 2022-05-30
Payer: MEDICARE

## 2022-06-01 ENCOUNTER — TELEPHONE (OUTPATIENT)
Dept: CARDIOLOGY | Facility: HOSPITAL | Age: 82
End: 2022-06-01
Payer: MEDICARE

## 2022-06-01 NOTE — PRE-PROCEDURE INSTRUCTIONS
Spoke with patient regarding procedure scheduled on 6.8    Arrival time 0910    Has patient been sick with fever or on antibiotics within the last 7 days? No    Does the patient have any open wounds, sores or rashes? No    Does the patient have any recent fractures? no    Received the COVID vaccination? yes    Has the patient stopped all medications as directed? Continue asa. Hold eliquis 3 days prior to procedure. Cardiac clearance obtained from dr rich on 5.10. hold dm meds am of procedure.    Does patient have a pacemaker and or defibrillator? pacemaker    Does the patient have a ride to and from procedure and someone reliable to remain with patient? Wife     Is the patient diabetic? yes    Does the patient have sleep apnea? Or use O2 at home? mary beth    Is the patient receiving sedation? yes    Is the patient instructed to remain NPO beginning at midnight the night before their procedure? yes    Procedure location confirmed with patient? Yes    Covid- Denies signs/symptoms. Instructed to notify PAT/MD if any changes.      Has patient received a vaccination within the last 7 days? No

## 2022-06-02 ENCOUNTER — INFUSION (OUTPATIENT)
Dept: INFUSION THERAPY | Facility: HOSPITAL | Age: 82
End: 2022-06-02
Attending: NURSE PRACTITIONER
Payer: MEDICARE

## 2022-06-02 ENCOUNTER — PATIENT MESSAGE (OUTPATIENT)
Dept: CARDIOLOGY | Facility: CLINIC | Age: 82
End: 2022-06-02

## 2022-06-02 ENCOUNTER — LAB VISIT (OUTPATIENT)
Dept: LAB | Facility: HOSPITAL | Age: 82
End: 2022-06-02
Attending: NURSE PRACTITIONER
Payer: MEDICARE

## 2022-06-02 ENCOUNTER — OFFICE VISIT (OUTPATIENT)
Dept: HEMATOLOGY/ONCOLOGY | Facility: CLINIC | Age: 82
End: 2022-06-02
Payer: MEDICARE

## 2022-06-02 VITALS
BODY MASS INDEX: 27.74 KG/M2 | SYSTOLIC BLOOD PRESSURE: 118 MMHG | HEART RATE: 72 BPM | OXYGEN SATURATION: 99 % | DIASTOLIC BLOOD PRESSURE: 64 MMHG | TEMPERATURE: 97 F | WEIGHT: 187.81 LBS

## 2022-06-02 DIAGNOSIS — D63.1 ANEMIA IN STAGE 4 CHRONIC KIDNEY DISEASE: ICD-10-CM

## 2022-06-02 DIAGNOSIS — I48.11 LONGSTANDING PERSISTENT ATRIAL FIBRILLATION: ICD-10-CM

## 2022-06-02 DIAGNOSIS — N17.9 AKI (ACUTE KIDNEY INJURY): ICD-10-CM

## 2022-06-02 DIAGNOSIS — I50.43 ACUTE ON CHRONIC COMBINED SYSTOLIC AND DIASTOLIC CONGESTIVE HEART FAILURE: ICD-10-CM

## 2022-06-02 DIAGNOSIS — N18.4 ANEMIA IN STAGE 4 CHRONIC KIDNEY DISEASE: ICD-10-CM

## 2022-06-02 DIAGNOSIS — D63.1 ANEMIA ASSOCIATED WITH STAGE 4 CHRONIC RENAL FAILURE: Primary | ICD-10-CM

## 2022-06-02 DIAGNOSIS — N18.4 ANEMIA ASSOCIATED WITH STAGE 4 CHRONIC RENAL FAILURE: Primary | ICD-10-CM

## 2022-06-02 DIAGNOSIS — C61 ADENOCARCINOMA OF PROSTATE: Primary | ICD-10-CM

## 2022-06-02 LAB
ANION GAP SERPL CALC-SCNC: 10 MMOL/L (ref 8–16)
BUN SERPL-MCNC: 53 MG/DL (ref 8–23)
CALCIUM SERPL-MCNC: 8.1 MG/DL (ref 8.7–10.5)
CHLORIDE SERPL-SCNC: 105 MMOL/L (ref 95–110)
CO2 SERPL-SCNC: 20 MMOL/L (ref 23–29)
CREAT SERPL-MCNC: 2.3 MG/DL (ref 0.5–1.4)
EST. GFR  (AFRICAN AMERICAN): 30 ML/MIN/1.73 M^2
EST. GFR  (NON AFRICAN AMERICAN): 26 ML/MIN/1.73 M^2
GLUCOSE SERPL-MCNC: 133 MG/DL (ref 70–110)
POTASSIUM SERPL-SCNC: 4.2 MMOL/L (ref 3.5–5.1)
SODIUM SERPL-SCNC: 135 MMOL/L (ref 136–145)

## 2022-06-02 PROCEDURE — 3078F DIAST BP <80 MM HG: CPT | Mod: CPTII,S$GLB,, | Performed by: INTERNAL MEDICINE

## 2022-06-02 PROCEDURE — 99999 PR PBB SHADOW E&M-EST. PATIENT-LVL IV: ICD-10-PCS | Mod: PBBFAC,,, | Performed by: INTERNAL MEDICINE

## 2022-06-02 PROCEDURE — 99999 PR PBB SHADOW E&M-EST. PATIENT-LVL IV: CPT | Mod: PBBFAC,,, | Performed by: INTERNAL MEDICINE

## 2022-06-02 PROCEDURE — 3288F PR FALLS RISK ASSESSMENT DOCUMENTED: ICD-10-PCS | Mod: CPTII,S$GLB,, | Performed by: INTERNAL MEDICINE

## 2022-06-02 PROCEDURE — 3074F PR MOST RECENT SYSTOLIC BLOOD PRESSURE < 130 MM HG: ICD-10-PCS | Mod: CPTII,S$GLB,, | Performed by: INTERNAL MEDICINE

## 2022-06-02 PROCEDURE — 3288F FALL RISK ASSESSMENT DOCD: CPT | Mod: CPTII,S$GLB,, | Performed by: INTERNAL MEDICINE

## 2022-06-02 PROCEDURE — 63600175 PHARM REV CODE 636 W HCPCS: Mod: JG,EC | Performed by: NURSE PRACTITIONER

## 2022-06-02 PROCEDURE — 96372 THER/PROPH/DIAG INJ SC/IM: CPT

## 2022-06-02 PROCEDURE — 99215 OFFICE O/P EST HI 40 MIN: CPT | Mod: S$GLB,,, | Performed by: INTERNAL MEDICINE

## 2022-06-02 PROCEDURE — 1101F PR PT FALLS ASSESS DOC 0-1 FALLS W/OUT INJ PAST YR: ICD-10-PCS | Mod: CPTII,S$GLB,, | Performed by: INTERNAL MEDICINE

## 2022-06-02 PROCEDURE — 3078F PR MOST RECENT DIASTOLIC BLOOD PRESSURE < 80 MM HG: ICD-10-PCS | Mod: CPTII,S$GLB,, | Performed by: INTERNAL MEDICINE

## 2022-06-02 PROCEDURE — 99215 PR OFFICE/OUTPT VISIT, EST, LEVL V, 40-54 MIN: ICD-10-PCS | Mod: S$GLB,,, | Performed by: INTERNAL MEDICINE

## 2022-06-02 PROCEDURE — 1126F PR PAIN SEVERITY QUANTIFIED, NO PAIN PRESENT: ICD-10-PCS | Mod: CPTII,S$GLB,, | Performed by: INTERNAL MEDICINE

## 2022-06-02 PROCEDURE — 3074F SYST BP LT 130 MM HG: CPT | Mod: CPTII,S$GLB,, | Performed by: INTERNAL MEDICINE

## 2022-06-02 PROCEDURE — 80048 BASIC METABOLIC PNL TOTAL CA: CPT | Performed by: NURSE PRACTITIONER

## 2022-06-02 PROCEDURE — 1126F AMNT PAIN NOTED NONE PRSNT: CPT | Mod: CPTII,S$GLB,, | Performed by: INTERNAL MEDICINE

## 2022-06-02 PROCEDURE — 1101F PT FALLS ASSESS-DOCD LE1/YR: CPT | Mod: CPTII,S$GLB,, | Performed by: INTERNAL MEDICINE

## 2022-06-02 PROCEDURE — 3072F LOW RISK FOR RETINOPATHY: CPT | Mod: CPTII,S$GLB,, | Performed by: INTERNAL MEDICINE

## 2022-06-02 PROCEDURE — 3072F PR LOW RISK FOR RETINOPATHY: ICD-10-PCS | Mod: CPTII,S$GLB,, | Performed by: INTERNAL MEDICINE

## 2022-06-02 PROCEDURE — 36415 COLL VENOUS BLD VENIPUNCTURE: CPT | Performed by: NURSE PRACTITIONER

## 2022-06-02 RX ADMIN — EPOETIN ALFA-EPBX 40000 UNITS: 40000 INJECTION, SOLUTION INTRAVENOUS; SUBCUTANEOUS at 04:06

## 2022-06-02 NOTE — PROGRESS NOTES
Subjective:       Patient ID: Jake Ortiz is a 81 y.o. male.    Chief Complaint: No chief complaint on file.    HPI   Mr Ortiz presents today for follow-up.  This is the first time that I am seeing him.  He was last seen by our nurse practitioner on 04/22/2022 and prior to that again by the nurse practitioner on 03/22/2022.  He was also seen by Dr. Nath on 01/05/2022.  Briefly, he is a 81-year-old male with a remote history of prostate adenocarcinoma and also anemia for which he received iron infusions in April and has been on erythropoietin.  The erythropoietin dose is 40,000 units every other week (per patient), even though the orders in his chart stipulate weekly EPO.     He most recent CBC is from May 18, 2022 and shows a white count of 4800 per cubic mm, hemoglobin 9.4 grams/deciliter, hematocrit 31.8%,  and platelets 582832 per cubic mm.  Earlier today he had a BMP.  His creatinine has risen to 2.3 mg per dL while his BUN is 53.     He has an appointment to meet with his cardiologist later today.    Today he brought me a medication list which was slightly different from the one in our system.   He appears to be on apixaban, spironolactone, furosemide and losartan.        Review of Systems    Overall he feels well.  He denies any anxiety, depression, easy bruising, fevers, chills, night  sweats, weight loss, nausea, vomiting, diarrhea, constipation, diplopia,     blurred vision, headache, chest pain, palpitations, shortness of breath, breast pain, abdominal pain, extremity pain, or difficulty ambulating.  The remainder of the ten-point ROS, including general, skin, lymph, H/N, cardiorespiratory, GI, , Neuro, Endocrine, and psychiatric is negative.     Objective:      Physical Exam    He is alert, oriented to time, place, person, pleasant, well      nourished, in no acute physical distress.                                    VITAL SIGNS:  Reviewed                                      HEENT:   Normal.  There are no nasal, oral, lip, gingival, auricular, lid,    or conjunctival lesions.  Mucosae are moist and pink, and there is no        thrush.  Pupils are equal, reactive to light and accommodation.              Extraocular muscle movements are intact.    Dentition is good.  There is no frontal or maxillary tenderness.                                   NECK:  Supple without JVD, adenopathy, or thyromegaly.                       LUNGS:  Clear to auscultation without wheezing, rales, or rhonchi.           CARDIOVASCULAR:  Reveals an S1, S2, no murmurs, no rubs, no gallops.         ABDOMEN:  Soft, nontender, without organomegaly.  Bowel sounds are    present.                                                                     EXTREMITIES:  No cyanosis, clubbing, or edema.                                                              LYMPHATIC:  There is no cervical, axillary, or supraclavicular adenopathy.   SKIN:  Warm and moist, without petechiae, rashes, induration, or ecchymoses.           NEUROLOGIC:  DTRs are 0-1+ bilaterally, symmetrical, motor function is 5/5,  and cranial nerves are  within normal limits.  Assessment:       Problem List Items Addressed This Visit     Remote history of adenocarcinoma of prostate. PSA was undetectable in 2/2022           Anemia. Etiology unclear; most likely multifactorial. Improving with EPO injections      Worsening renal insufficiency. Secondary to over diuresis? Patient denies use of NSAIDs  Plan:       Patient may receive his erythropoietin injection today.  He should remain on erythropoietin on a weekly rather than biweekly basis.  He will have a repeat CBC and CMP 4 days from now, and I have alerted his primary care physician, Dr. Stevens, so that he can adjust his diuretics accordingly.  He will be seen here again in a week with a repeat CBC and CMP to decide whether he should continue the erythropoietin on a weekly basis.  His multiple questions were answered  to his satisfaction.

## 2022-06-02 NOTE — Clinical Note
Needs a CBC and CMP on Monday, and again next Thursday.  He needs to return next Thursday for his next EPO injection

## 2022-06-03 ENCOUNTER — TELEPHONE (OUTPATIENT)
Dept: INFUSION THERAPY | Facility: HOSPITAL | Age: 82
End: 2022-06-03
Payer: MEDICARE

## 2022-06-03 ENCOUNTER — TELEPHONE (OUTPATIENT)
Dept: CARDIOLOGY | Facility: HOSPITAL | Age: 82
End: 2022-06-03
Payer: MEDICARE

## 2022-06-03 NOTE — TELEPHONE ENCOUNTER
I spoke with patient and advised him that he was just booked in that slot to put him on the scheduled that it is noted that he will be getting his infusion after he see Dr Cole

## 2022-06-03 NOTE — TELEPHONE ENCOUNTER
Pt has seen both Dr. Shannon and Dr. Price in arrhythmia clinic within the last few months.  Called to clarify provider of choice, pt states he would like to remain under Dr. Shannon's care.      Pt has [employer] CRTP, device has triggered RRT.  He forgot about appt yesterday with N. May to sign consents and get pre-procedure labs.  He has been rescheduled for 6/9/22, 2pm at [facility].

## 2022-06-03 NOTE — TELEPHONE ENCOUNTER
----- Message from Irina Owens sent at 6/3/2022 10:29 AM CDT -----  Contact: Jake  Patient is calling to speak with a nurse regarding injection. Patient reports injection is scheduled at same time as another appointment. Patient request to reschedule for 2:30 pm or 3:00 if possible.  Please give patient a call back at [phone] or leave a voice message when possible.  Thank you,  GH

## 2022-06-03 NOTE — TELEPHONE ENCOUNTER
----- Message from Irina Owens sent at 6/3/2022 10:29 AM CDT -----  Contact: Jake  Patient is calling to speak with a nurse regarding injection. Patient reports injection is scheduled at same time as another appointment. Patient request to reschedule for 2:30 pm or 3:00 if possible.  Please give patient a call back at [phone] or leave a voice message when possible.  Thank you,  GH

## 2022-06-06 ENCOUNTER — LAB VISIT (OUTPATIENT)
Dept: LAB | Facility: HOSPITAL | Age: 82
End: 2022-06-06
Attending: INTERNAL MEDICINE
Payer: MEDICARE

## 2022-06-06 DIAGNOSIS — C61 ADENOCARCINOMA OF PROSTATE: ICD-10-CM

## 2022-06-06 LAB
ALBUMIN SERPL BCP-MCNC: 3.8 G/DL (ref 3.5–5.2)
ALP SERPL-CCNC: 73 U/L (ref 55–135)
ALT SERPL W/O P-5'-P-CCNC: 10 U/L (ref 10–44)
ANION GAP SERPL CALC-SCNC: 13 MMOL/L (ref 8–16)
AST SERPL-CCNC: 18 U/L (ref 10–40)
BILIRUB SERPL-MCNC: 0.2 MG/DL (ref 0.1–1)
BUN SERPL-MCNC: 39 MG/DL (ref 8–23)
CALCIUM SERPL-MCNC: 8.7 MG/DL (ref 8.7–10.5)
CHLORIDE SERPL-SCNC: 107 MMOL/L (ref 95–110)
CO2 SERPL-SCNC: 18 MMOL/L (ref 23–29)
CREAT SERPL-MCNC: 1.8 MG/DL (ref 0.5–1.4)
EST. GFR  (AFRICAN AMERICAN): 40.5 ML/MIN/1.73 M^2
EST. GFR  (NON AFRICAN AMERICAN): 35 ML/MIN/1.73 M^2
GLUCOSE SERPL-MCNC: 135 MG/DL (ref 70–110)
POTASSIUM SERPL-SCNC: 4.7 MMOL/L (ref 3.5–5.1)
PROT SERPL-MCNC: 6.5 G/DL (ref 6–8.4)
SODIUM SERPL-SCNC: 138 MMOL/L (ref 136–145)

## 2022-06-06 PROCEDURE — 80053 COMPREHEN METABOLIC PANEL: CPT | Performed by: INTERNAL MEDICINE

## 2022-06-06 PROCEDURE — 36415 COLL VENOUS BLD VENIPUNCTURE: CPT | Mod: PO | Performed by: INTERNAL MEDICINE

## 2022-06-08 ENCOUNTER — HOSPITAL ENCOUNTER (OUTPATIENT)
Facility: HOSPITAL | Age: 82
Discharge: HOME | End: 2022-06-08
Attending: ANESTHESIOLOGY | Admitting: ANESTHESIOLOGY
Payer: MEDICARE

## 2022-06-08 VITALS
WEIGHT: 184.88 LBS | RESPIRATION RATE: 16 BRPM | TEMPERATURE: 98 F | OXYGEN SATURATION: 100 % | BODY MASS INDEX: 27.38 KG/M2 | HEART RATE: 63 BPM | SYSTOLIC BLOOD PRESSURE: 119 MMHG | DIASTOLIC BLOOD PRESSURE: 57 MMHG | HEIGHT: 69 IN

## 2022-06-08 DIAGNOSIS — M54.16 LUMBAR RADICULOPATHY, CHRONIC: ICD-10-CM

## 2022-06-08 LAB — POCT GLUCOSE: 143 MG/DL (ref 70–110)

## 2022-06-08 PROCEDURE — 64483 NJX AA&/STRD TFRM EPI L/S 1: CPT | Mod: 50,,, | Performed by: ANESTHESIOLOGY

## 2022-06-08 PROCEDURE — 82962 GLUCOSE BLOOD TEST: CPT | Performed by: ANESTHESIOLOGY

## 2022-06-08 PROCEDURE — 25000003 PHARM REV CODE 250: Performed by: ANESTHESIOLOGY

## 2022-06-08 PROCEDURE — 25500020 PHARM REV CODE 255: Performed by: ANESTHESIOLOGY

## 2022-06-08 PROCEDURE — 64483 PR EPIDURAL INJ, ANES/STEROID, TRANSFORAMINAL, LUMB/SACR, SNGL LEVL: ICD-10-PCS | Mod: 50,,, | Performed by: ANESTHESIOLOGY

## 2022-06-08 PROCEDURE — 64483 NJX AA&/STRD TFRM EPI L/S 1: CPT | Mod: 50 | Performed by: ANESTHESIOLOGY

## 2022-06-08 PROCEDURE — 63600175 PHARM REV CODE 636 W HCPCS: Performed by: ANESTHESIOLOGY

## 2022-06-08 PROCEDURE — 99152 MOD SED SAME PHYS/QHP 5/>YRS: CPT | Performed by: ANESTHESIOLOGY

## 2022-06-08 RX ORDER — MIDAZOLAM HYDROCHLORIDE 1 MG/ML
INJECTION, SOLUTION INTRAMUSCULAR; INTRAVENOUS
Status: DISCONTINUED | OUTPATIENT
Start: 2022-06-08 | End: 2022-06-08 | Stop reason: HOSPADM

## 2022-06-08 RX ORDER — FENTANYL CITRATE 50 UG/ML
INJECTION, SOLUTION INTRAMUSCULAR; INTRAVENOUS
Status: DISCONTINUED | OUTPATIENT
Start: 2022-06-08 | End: 2022-06-08 | Stop reason: HOSPADM

## 2022-06-08 RX ORDER — INDOMETHACIN 25 MG/1
CAPSULE ORAL
Status: DISCONTINUED | OUTPATIENT
Start: 2022-06-08 | End: 2022-06-08 | Stop reason: HOSPADM

## 2022-06-08 RX ORDER — BUPIVACAINE HYDROCHLORIDE 2.5 MG/ML
INJECTION, SOLUTION EPIDURAL; INFILTRATION; INTRACAUDAL
Status: DISCONTINUED | OUTPATIENT
Start: 2022-06-08 | End: 2022-06-08 | Stop reason: HOSPADM

## 2022-06-08 RX ORDER — DEXAMETHASONE SODIUM PHOSPHATE 10 MG/ML
INJECTION INTRAMUSCULAR; INTRAVENOUS
Status: DISCONTINUED | OUTPATIENT
Start: 2022-06-08 | End: 2022-06-08 | Stop reason: HOSPADM

## 2022-06-08 NOTE — DISCHARGE SUMMARY
[facility] - Pain Mgmt 1st Fl  Discharge Note  Short Stay    Procedure(s) (LRB):  Bilateral L3/4 TF XUAN with RN IV sedation (Bilateral)    OUTCOME: Patient tolerated treatment/procedure well without complication and is now ready for discharge.    DISPOSITION: Home or Self Care    FINAL DIAGNOSIS:  <principal problem not specified>    FOLLOWUP: In clinic    DISCHARGE INSTRUCTIONS:  No discharge procedures on file.     TIME SPENT ON DISCHARGE: 15 minutes

## 2022-06-08 NOTE — OP NOTE
Jake Ortiz  81 y.o. male      Vitals:    06/08/22 0935   BP: (!) 103/56   Pulse: 60   Resp: 18   Temp:      Procedure Date:6/8/22      INFORMED CONSENT: The procedure, risks, benefits and options were discussed with patient. There are no contraindications to the procedure. The patient expressed understanding and agreed to proceed. The personnel performing the procedure was discussed. I verify that I personally obtained consent prior to the start of the procedure and the signed consent can be found on the patient's chart.       Anesthesia:   Conscious sedation provided by M.D    The patient was monitored with continuous pulse oximetry, EKG, and intermittent blood pressure monitors.  The patient was hemodynamically stable throughout the entire process was responsive to voice, and breathing spontaneously.  Supplemental O2 was provided at 2L/min via nasal cannula.  Patient was comfortable for the duration of the procedure. 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 IV access was obtained prior to the procedure. The patient was positioned prone on the fluoroscopy table. Continuous hemodynamic monitoring was initiated including blood pressure, EKG, and pulse oximetry. . The skin was prepped with chlorhexidine and draped in a sterile fashion. Skin anesthesia was achieved using a total of 10mL of lidocaine, 5mL over each respective injection site.     The  L3/4 transforaminal spaces were identified with fluoroscopy in the  AP, oblique, and lateral views.  A 22 gauge spinal quinke needle was then advanced into the area of the trans foraminal spaces bilateral with  confirmation of proper needle position using AP, oblique, and lateral fluoroscopic views. Once the needle tip was in the area of the transforaminal space, and there was no blood, CSF or paraesthesias,  1.5 mL of Omnipaque 300mg/ml was injected on bilateral for a total of 3mL.  Fluoroscopic imaging in the AP and lateral views revealed a clear outline of the spinal nerve with proximal spread of agent through the neural foramen into the epidural space. A total combination of 2 mL of Bupivicaine 0.25% and 10 mg dexamethasone was injected on each side for a total of 6 mL of injected medications with displacement of the contrast dye confirming that the medication went into the area of the transforaminal spaces bilateral. A sterile dressing was applied.   Patient tolerated the procedure well.    Patient was taken back to the recovery room for further observation.     The patient was discharged to home in stable condition

## 2022-06-08 NOTE — PLAN OF CARE
Patient recovered to baseline. Instructions given. All questions answered. All bandaids remain clean,dry, intact. Discharged to designated  at this time.

## 2022-06-08 NOTE — DISCHARGE INSTRUCTIONS

## 2022-06-09 ENCOUNTER — OFFICE VISIT (OUTPATIENT)
Dept: HEMATOLOGY/ONCOLOGY | Facility: CLINIC | Age: 82
End: 2022-06-09
Payer: MEDICARE

## 2022-06-09 ENCOUNTER — INFUSION (OUTPATIENT)
Dept: INFUSION THERAPY | Facility: HOSPITAL | Age: 82
End: 2022-06-09
Attending: INTERNAL MEDICINE
Payer: MEDICARE

## 2022-06-09 ENCOUNTER — OFFICE VISIT (OUTPATIENT)
Dept: CARDIOLOGY | Facility: CLINIC | Age: 82
End: 2022-06-09
Payer: MEDICARE

## 2022-06-09 VITALS
WEIGHT: 186.5 LBS | HEART RATE: 89 BPM | HEIGHT: 69 IN | DIASTOLIC BLOOD PRESSURE: 46 MMHG | OXYGEN SATURATION: 99 % | BODY MASS INDEX: 27.62 KG/M2 | SYSTOLIC BLOOD PRESSURE: 100 MMHG

## 2022-06-09 VITALS
DIASTOLIC BLOOD PRESSURE: 51 MMHG | DIASTOLIC BLOOD PRESSURE: 51 MMHG | WEIGHT: 186.31 LBS | TEMPERATURE: 97 F | HEART RATE: 86 BPM | HEART RATE: 86 BPM | SYSTOLIC BLOOD PRESSURE: 101 MMHG | SYSTOLIC BLOOD PRESSURE: 101 MMHG | BODY MASS INDEX: 27.51 KG/M2 | TEMPERATURE: 97 F | OXYGEN SATURATION: 99 % | RESPIRATION RATE: 18 BRPM

## 2022-06-09 DIAGNOSIS — C61 ADENOCARCINOMA OF PROSTATE: Primary | ICD-10-CM

## 2022-06-09 DIAGNOSIS — D50.0 IRON DEFICIENCY ANEMIA DUE TO CHRONIC BLOOD LOSS: ICD-10-CM

## 2022-06-09 DIAGNOSIS — I48.11 LONGSTANDING PERSISTENT ATRIAL FIBRILLATION: ICD-10-CM

## 2022-06-09 DIAGNOSIS — N18.4 ANEMIA ASSOCIATED WITH STAGE 4 CHRONIC RENAL FAILURE: Primary | ICD-10-CM

## 2022-06-09 DIAGNOSIS — D63.1 ANEMIA ASSOCIATED WITH STAGE 4 CHRONIC RENAL FAILURE: ICD-10-CM

## 2022-06-09 DIAGNOSIS — Z95.0 PRESENCE OF CARDIAC RESYNCHRONIZATION THERAPY PACEMAKER (CRT-P): Primary | ICD-10-CM

## 2022-06-09 DIAGNOSIS — I25.118 CORONARY ARTERY DISEASE OF NATIVE ARTERY OF NATIVE HEART WITH STABLE ANGINA PECTORIS: ICD-10-CM

## 2022-06-09 DIAGNOSIS — N18.4 STAGE 4 CHRONIC KIDNEY DISEASE: ICD-10-CM

## 2022-06-09 DIAGNOSIS — D63.1 ANEMIA ASSOCIATED WITH STAGE 4 CHRONIC RENAL FAILURE: Primary | ICD-10-CM

## 2022-06-09 DIAGNOSIS — I70.0 ARTERIOSCLEROSIS OF AORTA: ICD-10-CM

## 2022-06-09 DIAGNOSIS — I51.7 CARDIOMEGALY: ICD-10-CM

## 2022-06-09 DIAGNOSIS — N18.4 ANEMIA ASSOCIATED WITH STAGE 4 CHRONIC RENAL FAILURE: ICD-10-CM

## 2022-06-09 PROCEDURE — 3072F PR LOW RISK FOR RETINOPATHY: ICD-10-PCS | Mod: CPTII,S$GLB,, | Performed by: INTERNAL MEDICINE

## 2022-06-09 PROCEDURE — 3074F SYST BP LT 130 MM HG: CPT | Mod: CPTII,S$GLB,, | Performed by: NURSE PRACTITIONER

## 2022-06-09 PROCEDURE — 3078F PR MOST RECENT DIASTOLIC BLOOD PRESSURE < 80 MM HG: ICD-10-PCS | Mod: CPTII,S$GLB,, | Performed by: NURSE PRACTITIONER

## 2022-06-09 PROCEDURE — 3078F DIAST BP <80 MM HG: CPT | Mod: CPTII,S$GLB,, | Performed by: INTERNAL MEDICINE

## 2022-06-09 PROCEDURE — 3288F FALL RISK ASSESSMENT DOCD: CPT | Mod: CPTII,S$GLB,, | Performed by: INTERNAL MEDICINE

## 2022-06-09 PROCEDURE — 99213 OFFICE O/P EST LOW 20 MIN: CPT | Mod: S$GLB,,, | Performed by: INTERNAL MEDICINE

## 2022-06-09 PROCEDURE — 99999 PR PBB SHADOW E&M-EST. PATIENT-LVL IV: CPT | Mod: PBBFAC,,, | Performed by: NURSE PRACTITIONER

## 2022-06-09 PROCEDURE — 1101F PT FALLS ASSESS-DOCD LE1/YR: CPT | Mod: CPTII,S$GLB,, | Performed by: NURSE PRACTITIONER

## 2022-06-09 PROCEDURE — 3072F PR LOW RISK FOR RETINOPATHY: ICD-10-PCS | Mod: CPTII,S$GLB,, | Performed by: NURSE PRACTITIONER

## 2022-06-09 PROCEDURE — 99999 PR PBB SHADOW E&M-EST. PATIENT-LVL III: ICD-10-PCS | Mod: PBBFAC,,, | Performed by: INTERNAL MEDICINE

## 2022-06-09 PROCEDURE — 1101F PR PT FALLS ASSESS DOC 0-1 FALLS W/OUT INJ PAST YR: ICD-10-PCS | Mod: CPTII,S$GLB,, | Performed by: NURSE PRACTITIONER

## 2022-06-09 PROCEDURE — 1126F PR PAIN SEVERITY QUANTIFIED, NO PAIN PRESENT: ICD-10-PCS | Mod: CPTII,S$GLB,, | Performed by: NURSE PRACTITIONER

## 2022-06-09 PROCEDURE — 1126F PR PAIN SEVERITY QUANTIFIED, NO PAIN PRESENT: ICD-10-PCS | Mod: CPTII,S$GLB,, | Performed by: INTERNAL MEDICINE

## 2022-06-09 PROCEDURE — 3074F SYST BP LT 130 MM HG: CPT | Mod: CPTII,S$GLB,, | Performed by: INTERNAL MEDICINE

## 2022-06-09 PROCEDURE — 3078F PR MOST RECENT DIASTOLIC BLOOD PRESSURE < 80 MM HG: ICD-10-PCS | Mod: CPTII,S$GLB,, | Performed by: INTERNAL MEDICINE

## 2022-06-09 PROCEDURE — 3288F PR FALLS RISK ASSESSMENT DOCUMENTED: ICD-10-PCS | Mod: CPTII,S$GLB,, | Performed by: NURSE PRACTITIONER

## 2022-06-09 PROCEDURE — 99213 PR OFFICE/OUTPT VISIT, EST, LEVL III, 20-29 MIN: ICD-10-PCS | Mod: S$GLB,,, | Performed by: INTERNAL MEDICINE

## 2022-06-09 PROCEDURE — 99215 PR OFFICE/OUTPT VISIT, EST, LEVL V, 40-54 MIN: ICD-10-PCS | Mod: S$GLB,,, | Performed by: NURSE PRACTITIONER

## 2022-06-09 PROCEDURE — 99999 PR PBB SHADOW E&M-EST. PATIENT-LVL III: CPT | Mod: PBBFAC,,, | Performed by: INTERNAL MEDICINE

## 2022-06-09 PROCEDURE — 3288F FALL RISK ASSESSMENT DOCD: CPT | Mod: CPTII,S$GLB,, | Performed by: NURSE PRACTITIONER

## 2022-06-09 PROCEDURE — 3072F LOW RISK FOR RETINOPATHY: CPT | Mod: CPTII,S$GLB,, | Performed by: INTERNAL MEDICINE

## 2022-06-09 PROCEDURE — 3072F LOW RISK FOR RETINOPATHY: CPT | Mod: CPTII,S$GLB,, | Performed by: NURSE PRACTITIONER

## 2022-06-09 PROCEDURE — 63600175 PHARM REV CODE 636 W HCPCS: Mod: JG | Performed by: NURSE PRACTITIONER

## 2022-06-09 PROCEDURE — 96372 THER/PROPH/DIAG INJ SC/IM: CPT

## 2022-06-09 PROCEDURE — 1101F PT FALLS ASSESS-DOCD LE1/YR: CPT | Mod: CPTII,S$GLB,, | Performed by: INTERNAL MEDICINE

## 2022-06-09 PROCEDURE — 3074F PR MOST RECENT SYSTOLIC BLOOD PRESSURE < 130 MM HG: ICD-10-PCS | Mod: CPTII,S$GLB,, | Performed by: INTERNAL MEDICINE

## 2022-06-09 PROCEDURE — 1126F AMNT PAIN NOTED NONE PRSNT: CPT | Mod: CPTII,S$GLB,, | Performed by: INTERNAL MEDICINE

## 2022-06-09 PROCEDURE — 3288F PR FALLS RISK ASSESSMENT DOCUMENTED: ICD-10-PCS | Mod: CPTII,S$GLB,, | Performed by: INTERNAL MEDICINE

## 2022-06-09 PROCEDURE — 99215 OFFICE O/P EST HI 40 MIN: CPT | Mod: S$GLB,,, | Performed by: NURSE PRACTITIONER

## 2022-06-09 PROCEDURE — 1159F PR MEDICATION LIST DOCUMENTED IN MEDICAL RECORD: ICD-10-PCS | Mod: CPTII,S$GLB,, | Performed by: NURSE PRACTITIONER

## 2022-06-09 PROCEDURE — 99999 PR PBB SHADOW E&M-EST. PATIENT-LVL IV: ICD-10-PCS | Mod: PBBFAC,,, | Performed by: NURSE PRACTITIONER

## 2022-06-09 PROCEDURE — 3074F PR MOST RECENT SYSTOLIC BLOOD PRESSURE < 130 MM HG: ICD-10-PCS | Mod: CPTII,S$GLB,, | Performed by: NURSE PRACTITIONER

## 2022-06-09 PROCEDURE — 3078F DIAST BP <80 MM HG: CPT | Mod: CPTII,S$GLB,, | Performed by: NURSE PRACTITIONER

## 2022-06-09 PROCEDURE — 1159F MED LIST DOCD IN RCRD: CPT | Mod: CPTII,S$GLB,, | Performed by: NURSE PRACTITIONER

## 2022-06-09 PROCEDURE — 1126F AMNT PAIN NOTED NONE PRSNT: CPT | Mod: CPTII,S$GLB,, | Performed by: NURSE PRACTITIONER

## 2022-06-09 PROCEDURE — 1101F PR PT FALLS ASSESS DOC 0-1 FALLS W/OUT INJ PAST YR: ICD-10-PCS | Mod: CPTII,S$GLB,, | Performed by: INTERNAL MEDICINE

## 2022-06-09 RX ADMIN — EPOETIN ALFA-EPBX 40000 UNITS: 40000 INJECTION, SOLUTION INTRAVENOUS; SUBCUTANEOUS at 12:06

## 2022-06-09 NOTE — H&P (VIEW-ONLY)
Subjective:   Patient ID:  Jake Ortiz is a 81 y.o. male who presents for evaluation of Congestive Heart Failure      Congestive Heart Failure  Pertinent negatives include no abdominal pain, chest pain, claudication, near-syncope, palpitations or shortness of breath.       Jake rOtiz is a 81 year old male who presents to CHF clinic for hospital follow up.     His current medical conditions include CAD s/p CABG, old MI, HFpEF with TR/MR, AVB s/p CRT, PAF on Eliquis, Anemia, HTN, HLP, DM Type II, CKD Stage IV.     He was recently admitted due to shortness of breath with LE edema and weight gain over the previous few days.     ED workup revealed , H/H 8/29. He reported recent dietary indiscretion with boiled crawfish. Repeat ECHO revealed EF 35%, Grade III/IV DD, Mild RVE with reduced RV function. He returns today and states he is doing ok.     Has lost from 219>>192 pounds since hospital discharge. He is doing a much better job since hospital discharge with diet and fluid restrictions. Previously, he was not following any restrictions.       Denies chest pain or anginal equivalents. No shortness of breath, KING or palpitations. Denies orthopnea, PND or abdominal bloating. Reports regular walking without any issues lately. NO leg swelling or claudications. No recent falls, syncope or near syncopal events. Reports compliance with medications and dietary restrictions. NO CNS complaints to suggest TIA or CVA today. No signs of abnormal bleeding on Eliquis, ASA.       He is still very limited with physical activity due to shortness of breath after a few feet. So he is very sedentary due to this. Today, He is NYHA FC III.     6/9/2022 update    Jake Ortzi returns to clinic for follow up and to discuss CRT P gen change with Dr Carrero.     He stopped Farxiga due to issues with lightheadedness and dizziness. Aldactone held for 5 days due to bump in renal function and resumed aldactone today. Denies any CHF  complaints today in office.     Denies chest pain or anginal equivalents. NO shortness of breath, KING or palpitations. Weight has been stable at home around 181-183 pounds since last visit. Denies orthopnea, PND or abdominal bloating. Doing well with limiting salt intake recently.       Past Medical History:   Diagnosis Date    Abnormal PFT     Anemia     Arthritis     Atrial fibrillation 10/19/2017    Back pain     Congestive heart failure 3/5/2018    Coronary artery disease     DM (diabetes mellitus) 2018     am 06/23/2020    DM (diabetes mellitus) 2016     am 08/17/2021    Hyperlipemia     Hypertension     Myocardial infarction     Obesity     NASREEN (obstructive sleep apnea) 6/5/2018    Prostate cancer 2015    Tobacco dependence     Type 2 diabetes mellitus        Past Surgical History:   Procedure Laterality Date    COLONOSCOPY N/A 9/22/2020    Procedure: COLONOSCOPY;  Surgeon: Javier Farmer MD;  Location: [facility] ENDO;  Service: Endoscopy;  Laterality: N/A;    CORONARY ARTERY BYPASS GRAFT  1987    EPIDURAL STEROID INJECTION N/A 11/22/2019    Procedure: Lumbar L5/S1 IL XUAN;  Surgeon: Tacho Trivedi MD;  Location: [facility] PAIN MGT;  Service: Pain Management;  Laterality: N/A;    EPIDURAL STEROID INJECTION N/A 1/6/2020    Procedure: Lumbar L5/S1 IL XUAN;  Surgeon: Tacho Trivedi MD;  Location: [facility] PAIN MGT;  Service: Pain Management;  Laterality: N/A;    EPIDURAL STEROID INJECTION N/A 2/10/2020    Procedure: Caudal XUAN;  Surgeon: Tacho Trivedi MD;  Location: [facility] PAIN MGT;  Service: Pain Management;  Laterality: N/A;    ESOPHAGOGASTRODUODENOSCOPY N/A 9/22/2020    Procedure: EGD (ESOPHAGOGASTRODUODENOSCOPY);  Surgeon: Javier Farmer MD;  Location: [facility] ENDO;  Service: Endoscopy;  Laterality: N/A;    INJECTION OF ANESTHETIC AGENT AROUND MEDIAL BRANCH NERVES INNERVATING LUMBAR FACET JOINT Bilateral 10/12/2020    Procedure: Bilateral L3-5 MBB;  Surgeon:  Tacho Trivedi MD;  Location: [facility] PAIN MGT;  Service: Pain Management;  Laterality: Bilateral;    INJECTION OF ANESTHETIC AGENT AROUND MEDIAL BRANCH NERVES INNERVATING LUMBAR FACET JOINT Bilateral 2020    Procedure: Bilateral L3-5 MBB with steroid;  Surgeon: Tacho Trivedi MD;  Location: [facility] PAIN MGT;  Service: Pain Management;  Laterality: Bilateral;    INTRALUMINAL GASTROINTESTINAL TRACT IMAGING VIA CAPSULE N/A 2021    Procedure: IMAGING PROCEDURE, GI TRACT, INTRALUMINAL, VIA CAPSULE;  Surgeon: Tahir Geronimo RN;  Location: [facility] ENDO;  Service: Endoscopy;  Laterality: N/A;    PROSTATE SURGERY      prostate radiation    RADIOFREQUENCY THERMOCOAGULATION Left 2021    Procedure: Left L3-5 Lumbar RFA;  Surgeon: Tacho Trivedi MD;  Location: [facility] PAIN MGT;  Service: Pain Management;  Laterality: Left;    RADIOFREQUENCY THERMOCOAGULATION Right 2021    Procedure: Right L3-5 Lumbar RFA;  Surgeon: Tacho Trivedi MD;  Location: [facility] PAIN MGT;  Service: Pain Management;  Laterality: Right;    SELECTIVE INJECTION OF ANESTHETIC AGENT AROUND LUMBAR SPINAL NERVE ROOT BY TRANSFORAMINAL APPROACH Bilateral 2022    Procedure: Bilateral L3/4 TF XUAN with RN IV sedation;  Surgeon: Philipp Demarco MD;  Location: [facility] PAIN MGT;  Service: Pain Management;  Laterality: Bilateral;    SPINE SURGERY      fusion    TONSILLECTOMY         Social History     Tobacco Use    Smoking status: Former Smoker     Packs/day: 1.50     Years: 20.00     Pack years: 30.00     Types: Cigarettes     Start date:      Quit date:      Years since quittin.4    Smokeless tobacco: Never Used   Substance Use Topics    Alcohol use: No    Drug use: No       Family History   Problem Relation Age of Onset    Heart attack Mother     Diabetes Mother     Heart disease Mother     Cataracts Mother     Stroke Father     Heart disease Father     Heart disease Brother      Wt Readings from Last 3 Encounters:    06/09/22 84.6 kg (186 lb 8.2 oz)   06/09/22 84.5 kg (186 lb 4.6 oz)   06/08/22 83.8 kg (184 lb 13.7 oz)     Temp Readings from Last 3 Encounters:   06/09/22 97.4 °F (36.3 °C)   06/09/22 97.4 °F (36.3 °C)   06/08/22 97.5 °F (36.4 °C) (Temporal)     BP Readings from Last 3 Encounters:   06/09/22 (!) 101/51   06/09/22 (!) 100/46   06/09/22 (!) 101/51     Pulse Readings from Last 3 Encounters:   06/09/22 86   06/09/22 89   06/09/22 86     Current Outpatient Medications on File Prior to Visit   Medication Sig Dispense Refill    aspirin 81 MG Chew Take 1 tablet (81 mg total) by mouth once daily. 30 tablet 0    atorvastatin (LIPITOR) 80 MG tablet One tablet daily (Patient taking differently: Take 80 mg by mouth every evening.) 90 tablet 1    furosemide (LASIX) 40 MG tablet Take 1 tablet (40 mg total) by mouth 2 (two) times daily. Can double to 2 tablets twice a day for 3 days for more swelling/fluid build up - take 80mg twice a day 90 tablet 1    losartan (COZAAR) 50 MG tablet Take 1 tablet (50 mg total) by mouth once daily. 90 tablet 3    potassium chloride SA (K-DUR,KLOR-CON) 20 MEQ tablet Take 1 tablet (20 mEq total) by mouth once daily. 90 tablet 1    acetaminophen (TYLENOL) 500 MG tablet Take 2 tablets (1,000 mg total) by mouth every 6 (six) hours as needed for Pain.  0    apixaban (ELIQUIS) 2.5 mg Tab Take 1 tablet (2.5 mg total) by mouth 2 (two) times daily. (Patient not taking: Reported on 6/9/2022) 180 tablet 1    blood sugar diagnostic Strp Check blood glucose levels daily in the AM fasting and 1-2 times more daily. 300 strip 3    cholecalciferol, vitamin D3, (VITAMIN D3) 25 mcg (1,000 unit) capsule Take 1,000 Units by mouth once daily.      clonazePAM (KLONOPIN) 1 MG tablet Take 1 tablet (1 mg total) by mouth nightly as needed for Anxiety. 90 tablet 0    dapagliflozin (FARXIGA) 10 mg tablet Take 1 tablet (10 mg total) by mouth once daily. (Patient not taking: Reported on 6/9/2022) 90 tablet 3     fluticasone propionate (FLONASE) 50 mcg/actuation nasal spray 2 sprays (100 mcg total) by Each Nostril route once daily. 48 g 5    krill/om-3/dha/epa/phospho/ast (MEGARED OMEGA-3 KRILL OIL ORAL) Take 1 capsule by mouth every morning.      lancets Misc Check blood glucose levels daily in the AM fasting and 1-2 times more daily. 300 each 3    levocetirizine (XYZAL) 5 MG tablet TAKE 1 TABLET EVERY EVENING 90 tablet 1    magnesium oxide (MAG-OX) 400 mg (241.3 mg magnesium) tablet Take 1 tablet (400 mg total) by mouth once daily. 90 tablet 1    multivitamin capsule Take 1 capsule by mouth once daily.      pantoprazole (PROTONIX) 40 MG tablet Take 1 tablet (40 mg total) by mouth once daily. 90 tablet 3    spironolactone (ALDACTONE) 25 MG tablet Take 1 tablet (25 mg total) by mouth once daily. (Patient not taking: Reported on 6/9/2022) 90 tablet 1    vit A/vit C/vit E/zinc/copper (OCUVITE PRESERVISION ORAL) Take 1 capsule by mouth every evening.       Current Facility-Administered Medications on File Prior to Visit   Medication Dose Route Frequency Provider Last Rate Last Admin    ondansetron injection 4 mg  4 mg Intravenous Once PRN Tacho Trivedi MD           Review of Systems   Constitutional: Positive for malaise/fatigue.   HENT: Negative for hearing loss and hoarse voice.    Eyes: Negative for blurred vision and visual disturbance.   Cardiovascular: Positive for dyspnea on exertion and irregular heartbeat. Negative for chest pain, claudication, leg swelling, near-syncope, orthopnea, palpitations, paroxysmal nocturnal dyspnea and syncope.   Respiratory: Negative for cough, hemoptysis, shortness of breath, sleep disturbances due to breathing, snoring and wheezing.    Endocrine: Negative for cold intolerance and heat intolerance.   Hematologic/Lymphatic: Bruises/bleeds easily.   Skin: Negative for color change, dry skin and nail changes.   Musculoskeletal: Positive for arthritis and back pain. Negative for  "joint pain and myalgias.   Gastrointestinal: Negative for bloating, abdominal pain, constipation, nausea and vomiting.   Genitourinary: Negative for dysuria, flank pain, hematuria and hesitancy.   Neurological: Negative for headaches, light-headedness, loss of balance, numbness, paresthesias and weakness.   Psychiatric/Behavioral: Negative for altered mental status.   Allergic/Immunologic: Negative for environmental allergies.     BP (!) 100/46 (BP Location: Right arm, Patient Position: Sitting)   Pulse 89   Ht 5' 9" (1.753 m)   Wt 84.6 kg (186 lb 8.2 oz)   SpO2 99%   BMI 27.54 kg/m²     Objective:   Physical Exam  Vitals and nursing note reviewed.   Constitutional:       General: He is not in acute distress.     Appearance: Normal appearance. He is well-developed. He is not ill-appearing.   HENT:      Head: Normocephalic and atraumatic.      Nose: Nose normal.      Mouth/Throat:      Mouth: Mucous membranes are moist.   Eyes:      Pupils: Pupils are equal, round, and reactive to light.   Neck:      Thyroid: No thyromegaly.      Vascular: No JVD.      Trachea: No tracheal deviation.   Cardiovascular:      Rate and Rhythm: Normal rate and regular rhythm.      Chest Wall: PMI is not displaced.      Pulses: Intact distal pulses.           Radial pulses are 2+ on the right side and 2+ on the left side.        Dorsalis pedis pulses are 2+ on the right side and 2+ on the left side.      Heart sounds: S1 normal and S2 normal. Heart sounds not distant. No murmur heard.     Comments: S/p CRT-P in excellent repair  Pulmonary:      Effort: Pulmonary effort is normal. No respiratory distress.      Breath sounds: Normal breath sounds. No wheezing.   Abdominal:      General: Bowel sounds are normal. There is no distension.      Palpations: Abdomen is soft.      Tenderness: There is no abdominal tenderness.   Musculoskeletal:         General: No swelling. Normal range of motion.      Cervical back: Full passive range of " motion without pain, normal range of motion and neck supple.      Right ankle: No swelling.      Left ankle: No swelling.   Skin:     General: Skin is warm and dry.      Capillary Refill: Capillary refill takes less than 2 seconds.      Nails: There is no clubbing.   Neurological:      General: No focal deficit present.      Mental Status: He is alert and oriented to person, place, and time. Mental status is at baseline.   Psychiatric:         Mood and Affect: Mood normal.         Speech: Speech normal.         Behavior: Behavior normal.         Thought Content: Thought content normal.         Judgment: Judgment normal.         Lab Results   Component Value Date    CHOL 126 07/07/2021    CHOL 171 03/20/2020    CHOL 174 09/20/2019     Lab Results   Component Value Date    HDL 38 (L) 07/07/2021    HDL 38 (L) 03/20/2020    HDL 45 09/20/2019     Lab Results   Component Value Date    LDLCALC 52.4 (L) 07/07/2021    LDLCALC 75.0 03/20/2020    LDLCALC 87.8 09/20/2019     Lab Results   Component Value Date    TRIG 178 (H) 07/07/2021    TRIG 290 (H) 03/20/2020    TRIG 206 (H) 09/20/2019     Lab Results   Component Value Date    CHOLHDL 30.2 07/07/2021    CHOLHDL 22.2 03/20/2020    CHOLHDL 25.9 09/20/2019       Chemistry        Component Value Date/Time     06/06/2022 1027    K 4.7 06/06/2022 1027     06/06/2022 1027    CO2 18 (L) 06/06/2022 1027    BUN 39 (H) 06/06/2022 1027    CREATININE 1.8 (H) 06/06/2022 1027     (H) 06/06/2022 1027        Component Value Date/Time    CALCIUM 8.7 06/06/2022 1027    ALKPHOS 73 06/06/2022 1027    AST 18 06/06/2022 1027    ALT 10 06/06/2022 1027    BILITOT 0.2 06/06/2022 1027    ESTGFRAFRICA 40.5 (A) 06/06/2022 1027    EGFRNONAA 35.0 (A) 06/06/2022 1027          Lab Results   Component Value Date    TSH 1.268 11/04/2020     Lab Results   Component Value Date    INR 1.2 11/01/2020    INR 1.2 10/23/2020    INR 1.2 09/18/2020     Lab Results   Component Value Date    WBC 4.82  05/18/2022    HGB 9.4 (L) 05/18/2022    HCT 31.8 (L) 05/18/2022     (H) 05/18/2022     05/18/2022     1. TTE  Results for orders placed during the hospital encounter of 04/28/22    Echo    Interpretation Summary  · The left ventricle is normal in size with concentric hypertrophy and moderately decreased systolic function.  · The estimated ejection fraction is 35%.  · Grade III left ventricular diastolic dysfunction.  · Mild right ventricular enlargement with moderately reduced right ventricular systolic function.  · Mild left atrial enlargement.  · Mild right atrial enlargement.  · There is mild aortic valve stenosis.  · Aortic valve area is 1.53 cm2; peak velocity is 1.78 m/s; mean gradient is 7 mmHg.  · Mild mitral regurgitation.  · Mild tricuspid regurgitation.  · Elevated central venous pressure (15 mmHg).  · The estimated PA systolic pressure is 37 mmHg.  · There is abnormal septal wall motion. There is systolic and diastolic flattening of the interventricular septum consistent with right ventricle pressure and volume overload.  · There is moderate left ventricular global hypokinesis.     Assessment:      1. Presence of cardiac resynchronization therapy pacemaker (CRT-P)    2. Cardiomegaly    3. Longstanding persistent atrial fibrillation    4. Stage 4 chronic kidney disease    5. Iron deficiency anemia due to chronic blood loss    6. Arteriosclerosis of aorta    7. Coronary artery disease of native artery of native heart with stable angina pectoris        Plan:     Ok to proceed with CRT P gen change per Dr Carrero on June 16th at 11 AM  NPO status verified with patient  Hibiclens provided to patient in clinic today  Risks, benefits and treatment alternatives reviewed with patient  Consent obtained and appropriately witnessed today in clinic.     RTC post procedure for further CHF titration if needed.     CHF SYNOPSIS:    GDMT:  ACE/ARB/ARNI: Losartan 50 mg BID  Beta Blocker: none  MRA:  Aldactone 25 mg daily  SGLT2: intolerant of farxiga  Diuretic: Lasix 40 mg BID        Nicole May, FNP-C [facility]/Heart Failure

## 2022-06-09 NOTE — PROGRESS NOTES
Subjective:   Patient ID:  Jake Ortiz is a 81 y.o. male who presents for evaluation of Congestive Heart Failure      Congestive Heart Failure  Pertinent negatives include no abdominal pain, chest pain, claudication, near-syncope, palpitations or shortness of breath.       Jake Ortiz is a 81 year old male who presents to CHF clinic for hospital follow up.     His current medical conditions include CAD s/p CABG, old MI, HFpEF with TR/MR, AVB s/p CRT, PAF on Eliquis, Anemia, HTN, HLP, DM Type II, CKD Stage IV.     He was recently admitted due to shortness of breath with LE edema and weight gain over the previous few days.     ED workup revealed , H/H 8/29. He reported recent dietary indiscretion with boiled crawfish. Repeat ECHO revealed EF 35%, Grade III/IV DD, Mild RVE with reduced RV function. He returns today and states he is doing ok.     Has lost from 219>>192 pounds since hospital discharge. He is doing a much better job since hospital discharge with diet and fluid restrictions. Previously, he was not following any restrictions.       Denies chest pain or anginal equivalents. No shortness of breath, KING or palpitations. Denies orthopnea, PND or abdominal bloating. Reports regular walking without any issues lately. NO leg swelling or claudications. No recent falls, syncope or near syncopal events. Reports compliance with medications and dietary restrictions. NO CNS complaints to suggest TIA or CVA today. No signs of abnormal bleeding on Eliquis, ASA.       He is still very limited with physical activity due to shortness of breath after a few feet. So he is very sedentary due to this. Today, He is NYHA FC III.     6/9/2022 update    Jake Ortiz returns to clinic for follow up and to discuss CRT P gen change with Dr Carrero.     He stopped Farxiga due to issues with lightheadedness and dizziness. Aldactone held for 5 days due to bump in renal function and resumed aldactone today. Denies any CHF  complaints today in office.     Denies chest pain or anginal equivalents. NO shortness of breath, KING or palpitations. Weight has been stable at home around 181-183 pounds since last visit. Denies orthopnea, PND or abdominal bloating. Doing well with limiting salt intake recently.       Past Medical History:   Diagnosis Date    Abnormal PFT     Anemia     Arthritis     Atrial fibrillation 10/19/2017    Back pain     Congestive heart failure 3/5/2018    Coronary artery disease     DM (diabetes mellitus) 2018     am 06/23/2020    DM (diabetes mellitus) 2016     am 08/17/2021    Hyperlipemia     Hypertension     Myocardial infarction     Obesity     NASREEN (obstructive sleep apnea) 6/5/2018    Prostate cancer 2015    Tobacco dependence     Type 2 diabetes mellitus        Past Surgical History:   Procedure Laterality Date    COLONOSCOPY N/A 9/22/2020    Procedure: COLONOSCOPY;  Surgeon: Javier Farmer MD;  Location: [facility] ENDO;  Service: Endoscopy;  Laterality: N/A;    CORONARY ARTERY BYPASS GRAFT  1987    EPIDURAL STEROID INJECTION N/A 11/22/2019    Procedure: Lumbar L5/S1 IL XUAN;  Surgeon: Tacho Trivedi MD;  Location: [facility] PAIN MGT;  Service: Pain Management;  Laterality: N/A;    EPIDURAL STEROID INJECTION N/A 1/6/2020    Procedure: Lumbar L5/S1 IL XUAN;  Surgeon: Tacho Trivedi MD;  Location: [facility] PAIN MGT;  Service: Pain Management;  Laterality: N/A;    EPIDURAL STEROID INJECTION N/A 2/10/2020    Procedure: Caudal XUAN;  Surgeon: Tacho Trivedi MD;  Location: [facility] PAIN MGT;  Service: Pain Management;  Laterality: N/A;    ESOPHAGOGASTRODUODENOSCOPY N/A 9/22/2020    Procedure: EGD (ESOPHAGOGASTRODUODENOSCOPY);  Surgeon: Javier Farmer MD;  Location: [facility] ENDO;  Service: Endoscopy;  Laterality: N/A;    INJECTION OF ANESTHETIC AGENT AROUND MEDIAL BRANCH NERVES INNERVATING LUMBAR FACET JOINT Bilateral 10/12/2020    Procedure: Bilateral L3-5 MBB;  Surgeon:  Tacho Trivedi MD;  Location: [facility] PAIN MGT;  Service: Pain Management;  Laterality: Bilateral;    INJECTION OF ANESTHETIC AGENT AROUND MEDIAL BRANCH NERVES INNERVATING LUMBAR FACET JOINT Bilateral 2020    Procedure: Bilateral L3-5 MBB with steroid;  Surgeon: Tacho Trivedi MD;  Location: [facility] PAIN MGT;  Service: Pain Management;  Laterality: Bilateral;    INTRALUMINAL GASTROINTESTINAL TRACT IMAGING VIA CAPSULE N/A 2021    Procedure: IMAGING PROCEDURE, GI TRACT, INTRALUMINAL, VIA CAPSULE;  Surgeon: Tahir Geronimo RN;  Location: [facility] ENDO;  Service: Endoscopy;  Laterality: N/A;    PROSTATE SURGERY      prostate radiation    RADIOFREQUENCY THERMOCOAGULATION Left 2021    Procedure: Left L3-5 Lumbar RFA;  Surgeon: Tacho Trivedi MD;  Location: [facility] PAIN MGT;  Service: Pain Management;  Laterality: Left;    RADIOFREQUENCY THERMOCOAGULATION Right 2021    Procedure: Right L3-5 Lumbar RFA;  Surgeon: Tacho Trivedi MD;  Location: [facility] PAIN MGT;  Service: Pain Management;  Laterality: Right;    SELECTIVE INJECTION OF ANESTHETIC AGENT AROUND LUMBAR SPINAL NERVE ROOT BY TRANSFORAMINAL APPROACH Bilateral 2022    Procedure: Bilateral L3/4 TF XUAN with RN IV sedation;  Surgeon: Philipp Demarco MD;  Location: [facility] PAIN MGT;  Service: Pain Management;  Laterality: Bilateral;    SPINE SURGERY      fusion    TONSILLECTOMY         Social History     Tobacco Use    Smoking status: Former Smoker     Packs/day: 1.50     Years: 20.00     Pack years: 30.00     Types: Cigarettes     Start date:      Quit date:      Years since quittin.4    Smokeless tobacco: Never Used   Substance Use Topics    Alcohol use: No    Drug use: No       Family History   Problem Relation Age of Onset    Heart attack Mother     Diabetes Mother     Heart disease Mother     Cataracts Mother     Stroke Father     Heart disease Father     Heart disease Brother      Wt Readings from Last 3 Encounters:    06/09/22 84.6 kg (186 lb 8.2 oz)   06/09/22 84.5 kg (186 lb 4.6 oz)   06/08/22 83.8 kg (184 lb 13.7 oz)     Temp Readings from Last 3 Encounters:   06/09/22 97.4 °F (36.3 °C)   06/09/22 97.4 °F (36.3 °C)   06/08/22 97.5 °F (36.4 °C) (Temporal)     BP Readings from Last 3 Encounters:   06/09/22 (!) 101/51   06/09/22 (!) 100/46   06/09/22 (!) 101/51     Pulse Readings from Last 3 Encounters:   06/09/22 86   06/09/22 89   06/09/22 86     Current Outpatient Medications on File Prior to Visit   Medication Sig Dispense Refill    aspirin 81 MG Chew Take 1 tablet (81 mg total) by mouth once daily. 30 tablet 0    atorvastatin (LIPITOR) 80 MG tablet One tablet daily (Patient taking differently: Take 80 mg by mouth every evening.) 90 tablet 1    furosemide (LASIX) 40 MG tablet Take 1 tablet (40 mg total) by mouth 2 (two) times daily. Can double to 2 tablets twice a day for 3 days for more swelling/fluid build up - take 80mg twice a day 90 tablet 1    losartan (COZAAR) 50 MG tablet Take 1 tablet (50 mg total) by mouth once daily. 90 tablet 3    potassium chloride SA (K-DUR,KLOR-CON) 20 MEQ tablet Take 1 tablet (20 mEq total) by mouth once daily. 90 tablet 1    acetaminophen (TYLENOL) 500 MG tablet Take 2 tablets (1,000 mg total) by mouth every 6 (six) hours as needed for Pain.  0    apixaban (ELIQUIS) 2.5 mg Tab Take 1 tablet (2.5 mg total) by mouth 2 (two) times daily. (Patient not taking: Reported on 6/9/2022) 180 tablet 1    blood sugar diagnostic Strp Check blood glucose levels daily in the AM fasting and 1-2 times more daily. 300 strip 3    cholecalciferol, vitamin D3, (VITAMIN D3) 25 mcg (1,000 unit) capsule Take 1,000 Units by mouth once daily.      clonazePAM (KLONOPIN) 1 MG tablet Take 1 tablet (1 mg total) by mouth nightly as needed for Anxiety. 90 tablet 0    dapagliflozin (FARXIGA) 10 mg tablet Take 1 tablet (10 mg total) by mouth once daily. (Patient not taking: Reported on 6/9/2022) 90 tablet 3     fluticasone propionate (FLONASE) 50 mcg/actuation nasal spray 2 sprays (100 mcg total) by Each Nostril route once daily. 48 g 5    krill/om-3/dha/epa/phospho/ast (MEGARED OMEGA-3 KRILL OIL ORAL) Take 1 capsule by mouth every morning.      lancets Misc Check blood glucose levels daily in the AM fasting and 1-2 times more daily. 300 each 3    levocetirizine (XYZAL) 5 MG tablet TAKE 1 TABLET EVERY EVENING 90 tablet 1    magnesium oxide (MAG-OX) 400 mg (241.3 mg magnesium) tablet Take 1 tablet (400 mg total) by mouth once daily. 90 tablet 1    multivitamin capsule Take 1 capsule by mouth once daily.      pantoprazole (PROTONIX) 40 MG tablet Take 1 tablet (40 mg total) by mouth once daily. 90 tablet 3    spironolactone (ALDACTONE) 25 MG tablet Take 1 tablet (25 mg total) by mouth once daily. (Patient not taking: Reported on 6/9/2022) 90 tablet 1    vit A/vit C/vit E/zinc/copper (OCUVITE PRESERVISION ORAL) Take 1 capsule by mouth every evening.       Current Facility-Administered Medications on File Prior to Visit   Medication Dose Route Frequency Provider Last Rate Last Admin    ondansetron injection 4 mg  4 mg Intravenous Once PRN Tacho Trivedi MD           Review of Systems   Constitutional: Positive for malaise/fatigue.   HENT: Negative for hearing loss and hoarse voice.    Eyes: Negative for blurred vision and visual disturbance.   Cardiovascular: Positive for dyspnea on exertion and irregular heartbeat. Negative for chest pain, claudication, leg swelling, near-syncope, orthopnea, palpitations, paroxysmal nocturnal dyspnea and syncope.   Respiratory: Negative for cough, hemoptysis, shortness of breath, sleep disturbances due to breathing, snoring and wheezing.    Endocrine: Negative for cold intolerance and heat intolerance.   Hematologic/Lymphatic: Bruises/bleeds easily.   Skin: Negative for color change, dry skin and nail changes.   Musculoskeletal: Positive for arthritis and back pain. Negative for  "joint pain and myalgias.   Gastrointestinal: Negative for bloating, abdominal pain, constipation, nausea and vomiting.   Genitourinary: Negative for dysuria, flank pain, hematuria and hesitancy.   Neurological: Negative for headaches, light-headedness, loss of balance, numbness, paresthesias and weakness.   Psychiatric/Behavioral: Negative for altered mental status.   Allergic/Immunologic: Negative for environmental allergies.     BP (!) 100/46 (BP Location: Right arm, Patient Position: Sitting)   Pulse 89   Ht 5' 9" (1.753 m)   Wt 84.6 kg (186 lb 8.2 oz)   SpO2 99%   BMI 27.54 kg/m²     Objective:   Physical Exam  Vitals and nursing note reviewed.   Constitutional:       General: He is not in acute distress.     Appearance: Normal appearance. He is well-developed. He is not ill-appearing.   HENT:      Head: Normocephalic and atraumatic.      Nose: Nose normal.      Mouth/Throat:      Mouth: Mucous membranes are moist.   Eyes:      Pupils: Pupils are equal, round, and reactive to light.   Neck:      Thyroid: No thyromegaly.      Vascular: No JVD.      Trachea: No tracheal deviation.   Cardiovascular:      Rate and Rhythm: Normal rate and regular rhythm.      Chest Wall: PMI is not displaced.      Pulses: Intact distal pulses.           Radial pulses are 2+ on the right side and 2+ on the left side.        Dorsalis pedis pulses are 2+ on the right side and 2+ on the left side.      Heart sounds: S1 normal and S2 normal. Heart sounds not distant. No murmur heard.     Comments: S/p CRT-P in excellent repair  Pulmonary:      Effort: Pulmonary effort is normal. No respiratory distress.      Breath sounds: Normal breath sounds. No wheezing.   Abdominal:      General: Bowel sounds are normal. There is no distension.      Palpations: Abdomen is soft.      Tenderness: There is no abdominal tenderness.   Musculoskeletal:         General: No swelling. Normal range of motion.      Cervical back: Full passive range of " motion without pain, normal range of motion and neck supple.      Right ankle: No swelling.      Left ankle: No swelling.   Skin:     General: Skin is warm and dry.      Capillary Refill: Capillary refill takes less than 2 seconds.      Nails: There is no clubbing.   Neurological:      General: No focal deficit present.      Mental Status: He is alert and oriented to person, place, and time. Mental status is at baseline.   Psychiatric:         Mood and Affect: Mood normal.         Speech: Speech normal.         Behavior: Behavior normal.         Thought Content: Thought content normal.         Judgment: Judgment normal.         Lab Results   Component Value Date    CHOL 126 07/07/2021    CHOL 171 03/20/2020    CHOL 174 09/20/2019     Lab Results   Component Value Date    HDL 38 (L) 07/07/2021    HDL 38 (L) 03/20/2020    HDL 45 09/20/2019     Lab Results   Component Value Date    LDLCALC 52.4 (L) 07/07/2021    LDLCALC 75.0 03/20/2020    LDLCALC 87.8 09/20/2019     Lab Results   Component Value Date    TRIG 178 (H) 07/07/2021    TRIG 290 (H) 03/20/2020    TRIG 206 (H) 09/20/2019     Lab Results   Component Value Date    CHOLHDL 30.2 07/07/2021    CHOLHDL 22.2 03/20/2020    CHOLHDL 25.9 09/20/2019       Chemistry        Component Value Date/Time     06/06/2022 1027    K 4.7 06/06/2022 1027     06/06/2022 1027    CO2 18 (L) 06/06/2022 1027    BUN 39 (H) 06/06/2022 1027    CREATININE 1.8 (H) 06/06/2022 1027     (H) 06/06/2022 1027        Component Value Date/Time    CALCIUM 8.7 06/06/2022 1027    ALKPHOS 73 06/06/2022 1027    AST 18 06/06/2022 1027    ALT 10 06/06/2022 1027    BILITOT 0.2 06/06/2022 1027    ESTGFRAFRICA 40.5 (A) 06/06/2022 1027    EGFRNONAA 35.0 (A) 06/06/2022 1027          Lab Results   Component Value Date    TSH 1.268 11/04/2020     Lab Results   Component Value Date    INR 1.2 11/01/2020    INR 1.2 10/23/2020    INR 1.2 09/18/2020     Lab Results   Component Value Date    WBC 4.82  05/18/2022    HGB 9.4 (L) 05/18/2022    HCT 31.8 (L) 05/18/2022     (H) 05/18/2022     05/18/2022     1. TTE  Results for orders placed during the hospital encounter of 04/28/22    Echo    Interpretation Summary  · The left ventricle is normal in size with concentric hypertrophy and moderately decreased systolic function.  · The estimated ejection fraction is 35%.  · Grade III left ventricular diastolic dysfunction.  · Mild right ventricular enlargement with moderately reduced right ventricular systolic function.  · Mild left atrial enlargement.  · Mild right atrial enlargement.  · There is mild aortic valve stenosis.  · Aortic valve area is 1.53 cm2; peak velocity is 1.78 m/s; mean gradient is 7 mmHg.  · Mild mitral regurgitation.  · Mild tricuspid regurgitation.  · Elevated central venous pressure (15 mmHg).  · The estimated PA systolic pressure is 37 mmHg.  · There is abnormal septal wall motion. There is systolic and diastolic flattening of the interventricular septum consistent with right ventricle pressure and volume overload.  · There is moderate left ventricular global hypokinesis.     Assessment:      1. Presence of cardiac resynchronization therapy pacemaker (CRT-P)    2. Cardiomegaly    3. Longstanding persistent atrial fibrillation    4. Stage 4 chronic kidney disease    5. Iron deficiency anemia due to chronic blood loss    6. Arteriosclerosis of aorta    7. Coronary artery disease of native artery of native heart with stable angina pectoris        Plan:     Ok to proceed with CRT P gen change per Dr Carrero on June 16th at 11 AM  NPO status verified with patient  Hibiclens provided to patient in clinic today  Risks, benefits and treatment alternatives reviewed with patient  Consent obtained and appropriately witnessed today in clinic.     RTC post procedure for further CHF titration if needed.     CHF SYNOPSIS:    GDMT:  ACE/ARB/ARNI: Losartan 50 mg BID  Beta Blocker: none  MRA:  Aldactone 25 mg daily  SGLT2: intolerant of farxiga  Diuretic: Lasix 40 mg BID        Nicole May, FNP-C [facility]

## 2022-06-09 NOTE — PROGRESS NOTES
Subjective:       Patient ID: Jake Ortiz is a 81 y.o. male.    Chief Complaint: No chief complaint on file.    HPI   Mr Ortiz presents today for follow-up.  This is the 2nd time that I am seeing him.  My initial visit with him was a week ago, on June 2, 2022. Prior to that, he had been seen by our nurse practitioner on 04/22/2022 and prior to that again by the nurse practitioner on 03/22/2022.  He was also seen by Dr. Nath on 01/05/2022.  Briefly, he is a 81-year-old male with a remote history of prostate adenocarcinoma and also anemia for which he received iron infusions in April, and has been on erythropoietin.  The current erythropoietin dose is 40,000 units weekly.     He most recent CBC is from May 18, 2022 and shows a white count of 4,800 per cubic mm, hemoglobin 9.4 grams/deciliter, hematocrit 31.8%,  and platelets 361,000 per cubic mm.  Repeat BMP on June 6, 2022 shows that his creatinine is down to 1.8 mg/dL and his BUN is down to 39.  Last week when I saw him his creatinine had risen to 2.3 mg per dL while his BUN is 53.       Review of Systems    Overall he feels well.  He denies any anxiety, depression, easy bruising, fevers, chills, night  sweats, weight loss, nausea, vomiting, diarrhea, constipation, diplopia,     blurred vision, headache, chest pain, palpitations, shortness of breath, breast pain, abdominal pain, extremity pain, or difficulty ambulating.  The remainder of the ten-point ROS, including general, skin, lymph, H/N, cardiorespiratory, GI, , Neuro, Endocrine, and psychiatric is negative.     Objective:      Physical Exam    He is alert, oriented to time, place, person, pleasant, well      nourished, in no acute physical distress.                                    VITAL SIGNS:  Reviewed                                      HEENT:  Normal.  There are no nasal, oral, lip, gingival, auricular, lid,    or conjunctival lesions.  Mucosae are moist and pink, and there is no         thrush.  Pupils are equal, reactive to light and accommodation.              Extraocular muscle movements are intact.    Dentition is good.  There is no frontal or maxillary tenderness.                                   NECK:  Supple without JVD, adenopathy, or thyromegaly.                       LUNGS:  Clear to auscultation without wheezing, rales, or rhonchi.           CARDIOVASCULAR:  Reveals an S1, S2, no murmurs, no rubs, no gallops.         ABDOMEN:  Soft, nontender, without organomegaly.  Bowel sounds are    present.                                                                     EXTREMITIES:  No cyanosis, clubbing, or edema.                                                              LYMPHATIC:  There is no cervical, axillary, or supraclavicular adenopathy.   SKIN:  Warm and moist, without petechiae, rashes, induration, or ecchymoses.           NEUROLOGIC:  DTRs are 0-1+ bilaterally, symmetrical, motor function is 5/5,  and cranial nerves are  within normal limits.  Assessment:       Problem List Items Addressed This Visit     Remote history of adenocarcinoma of prostate. PSA was undetectable in 2/2022           Anemia. Etiology unclear; most likely multifactorial.  Patient is currently receiving weekly erythropoietin        Plan:       Patient may receive his erythropoietin injection today.  He should remain on weekly erythropoietin and I will see him in a week with a repeat CBC.  His multiple questions were answered to his satisfaction.      6/13/2022 3 P.M. ADDENDUM  Patient had a CBC today that showed a white count of 5400 per cubic mm, hemoglobin 6.5 grams/deciliter, hematocrit 22.5% and platelets [id number] per cubic mm.  Patient will be contacted by my assistant and will be instructed to come to the emergency room to receive blood transfusions.

## 2022-06-10 ENCOUNTER — PATIENT MESSAGE (OUTPATIENT)
Dept: GASTROENTEROLOGY | Facility: CLINIC | Age: 82
End: 2022-06-10
Payer: MEDICARE

## 2022-06-10 ENCOUNTER — PATIENT MESSAGE (OUTPATIENT)
Dept: CARDIOLOGY | Facility: HOSPITAL | Age: 82
End: 2022-06-10
Payer: MEDICARE

## 2022-06-11 ENCOUNTER — PATIENT MESSAGE (OUTPATIENT)
Dept: CARDIOLOGY | Facility: CLINIC | Age: 82
End: 2022-06-11
Payer: MEDICARE

## 2022-06-13 ENCOUNTER — HOSPITAL ENCOUNTER (EMERGENCY)
Facility: HOSPITAL | Age: 82
Discharge: HOME | End: 2022-06-13
Attending: EMERGENCY MEDICINE
Payer: MEDICARE

## 2022-06-13 ENCOUNTER — PATIENT MESSAGE (OUTPATIENT)
Dept: CARDIOLOGY | Facility: CLINIC | Age: 82
End: 2022-06-13
Payer: MEDICARE

## 2022-06-13 ENCOUNTER — TELEPHONE (OUTPATIENT)
Dept: HEMATOLOGY/ONCOLOGY | Facility: CLINIC | Age: 82
End: 2022-06-13
Payer: MEDICARE

## 2022-06-13 ENCOUNTER — DOCUMENTATION ONLY (OUTPATIENT)
Dept: HEMATOLOGY/ONCOLOGY | Facility: CLINIC | Age: 82
End: 2022-06-13
Payer: MEDICARE

## 2022-06-13 VITALS
DIASTOLIC BLOOD PRESSURE: 57 MMHG | OXYGEN SATURATION: 100 % | BODY MASS INDEX: 27.79 KG/M2 | SYSTOLIC BLOOD PRESSURE: 117 MMHG | RESPIRATION RATE: 20 BRPM | WEIGHT: 188.19 LBS | HEART RATE: 60 BPM | TEMPERATURE: 99 F

## 2022-06-13 DIAGNOSIS — D64.9 ACUTE ON CHRONIC ANEMIA: Primary | ICD-10-CM

## 2022-06-13 LAB
ABO + RH BLD: NORMAL
BLD GP AB SCN CELLS X3 SERPL QL: NORMAL

## 2022-06-13 PROCEDURE — 99291 CRITICAL CARE FIRST HOUR: CPT | Mod: 25

## 2022-06-13 PROCEDURE — P9016 RBC LEUKOCYTES REDUCED: HCPCS | Performed by: EMERGENCY MEDICINE

## 2022-06-13 PROCEDURE — 36430 TRANSFUSION BLD/BLD COMPNT: CPT

## 2022-06-13 PROCEDURE — 86850 RBC ANTIBODY SCREEN: CPT | Performed by: REGISTERED NURSE

## 2022-06-13 PROCEDURE — 86920 COMPATIBILITY TEST SPIN: CPT | Performed by: EMERGENCY MEDICINE

## 2022-06-13 RX ORDER — HYDROCODONE BITARTRATE AND ACETAMINOPHEN 500; 5 MG/1; MG/1
TABLET ORAL
Status: DISCONTINUED | OUTPATIENT
Start: 2022-06-13 | End: 2022-06-14 | Stop reason: HOSPADM

## 2022-06-13 NOTE — TELEPHONE ENCOUNTER
----- Message from Thanh Matthews MD sent at 6/13/2022  2:57 PM CDT -----  Here he is.  Needs to come to get blood ASAP... Either through the ED tonight, or through the CLinic tomorrow.

## 2022-06-13 NOTE — TELEPHONE ENCOUNTER
Contacted pt regarding lab results. Instructed him to go to the ER for blood transfusion per Dr. Cole recommendation. Pt verbalized understanding and stated he will be there shortly.

## 2022-06-13 NOTE — FIRST PROVIDER EVALUATION
Medical screening exam completed.  I have conducted a focused provider triage encounter, findings are as follows:    Brief history of present illness:  Hgb 6.5 sent to the emergency room for blood transfusion    There were no vitals filed for this visit.    Pertinent physical exam:  No acute distress    Brief workup plan:  Type and screen and further evaluation was roomed    Preliminary workup initiated; this workup will be continued and followed by the physician or advanced practice provider that is assigned to the patient when roomed.

## 2022-06-14 ENCOUNTER — TELEPHONE (OUTPATIENT)
Dept: CARDIOLOGY | Facility: CLINIC | Age: 82
End: 2022-06-14
Payer: MEDICARE

## 2022-06-14 NOTE — ED PROVIDER NOTES
SCRIBE #1 NOTE: I, Papa Murphy, am scribing for, and in the presence of, Diamond Mann MD. I have scribed the entire note.       History     Chief Complaint   Patient presents with    Abnormal Lab     Pt had labs drawn this morning.  He received a t/c stating he needed to go to the ED due to decreased in H&H.     Review of patient's allergies indicates:  No Known Allergies      History of Present Illness     HPI    6/13/2022, 7:17 PM  History obtained from the patient      History of Present Illness: Jake Ortiz is a 81 y.o. male patient with a PMHx of anemia, atrial fibrillation, CHF, CAD, DM, hyperlipemia, HTN, MI, prostate cancer, who presents to the Emergency Department for evaluation of abnormal lab, which were drawn this AM due to adenocarcinoma of prostate and anemia, requiring a blood transfusion in the ED. Today, Wai Werner LPN (hem/onc office) notified the pt of a critical hemoglobin of 6.5 and referred the pt to the ED for a blood transfusion. Pt reports he has been fully evaluated by an upper/lower scope with NAF; however, pt reports poor kidney function. Symptoms are constant and moderate in severity. No mitigating or exacerbating factors reported. No associated sxs included. Patient denies any fatigue, CP, SOB, palpitations, HA, dizziness, extremity weakness/numbness/tingling, and all other sxs at this time. No prior Tx included. No further complaints or concerns at this time.       Arrival mode: Personal vehicle     PCP: Byron Stevens MD        Past Medical History:  Past Medical History:   Diagnosis Date    Abnormal PFT     Anemia     Arthritis     Atrial fibrillation 10/19/2017    Back pain     Congestive heart failure 3/5/2018    Coronary artery disease     DM (diabetes mellitus) 2018     am 06/23/2020    DM (diabetes mellitus) 2016     am 08/17/2021    Hyperlipemia     Hypertension     Myocardial infarction     Obesity     NASREEN (obstructive sleep  apnea) 6/5/2018    Prostate cancer 2015    Tobacco dependence     Type 2 diabetes mellitus        Past Surgical History:  Past Surgical History:   Procedure Laterality Date    COLONOSCOPY N/A 9/22/2020    Procedure: COLONOSCOPY;  Surgeon: Javier Farmer MD;  Location: [facility];  Service: Endoscopy;  Laterality: N/A;    CORONARY ARTERY BYPASS GRAFT  1987    EPIDURAL STEROID INJECTION N/A 11/22/2019    Procedure: Lumbar L5/S1 IL XUAN;  Surgeon: Tacho Trivedi MD;  Location: [facility];  Service: Pain Management;  Laterality: N/A;    EPIDURAL STEROID INJECTION N/A 1/6/2020    Procedure: Lumbar L5/S1 IL XUAN;  Surgeon: Tacho Trivedi MD;  Location: [facility];  Service: Pain Management;  Laterality: N/A;    EPIDURAL STEROID INJECTION N/A 2/10/2020    Procedure: Caudal XUAN;  Surgeon: Tacho Trivedi MD;  Location: [facility];  Service: Pain Management;  Laterality: N/A;    ESOPHAGOGASTRODUODENOSCOPY N/A 9/22/2020    Procedure: EGD (ESOPHAGOGASTRODUODENOSCOPY);  Surgeon: Javier Farmer MD;  Location: [facility];  Service: Endoscopy;  Laterality: N/A;    INJECTION OF ANESTHETIC AGENT AROUND MEDIAL BRANCH NERVES INNERVATING LUMBAR FACET JOINT Bilateral 10/12/2020    Procedure: Bilateral L3-5 MBB;  Surgeon: Tacho Trivedi MD;  Location: [facility];  Service: Pain Management;  Laterality: Bilateral;    INJECTION OF ANESTHETIC AGENT AROUND MEDIAL BRANCH NERVES INNERVATING LUMBAR FACET JOINT Bilateral 12/21/2020    Procedure: Bilateral L3-5 MBB with steroid;  Surgeon: Tacho Trivedi MD;  Location: [facility];  Service: Pain Management;  Laterality: Bilateral;    INTRALUMINAL GASTROINTESTINAL TRACT IMAGING VIA CAPSULE N/A 12/29/2021    Procedure: IMAGING PROCEDURE, GI TRACT, INTRALUMINAL, VIA CAPSULE;  Surgeon: Tahir Geronimo RN;  Location: [facility];  Service: Endoscopy;  Laterality: N/A;    PROSTATE SURGERY      prostate radiation    RADIOFREQUENCY THERMOCOAGULATION  Left 2021    Procedure: Left L3-5 Lumbar RFA;  Surgeon: Tacho Trivedi MD;  Location: [facility];  Service: Pain Management;  Laterality: Left;    RADIOFREQUENCY THERMOCOAGULATION Right 2021    Procedure: Right L3-5 Lumbar RFA;  Surgeon: Tacho Trivedi MD;  Location: [facility];  Service: Pain Management;  Laterality: Right;    SELECTIVE INJECTION OF ANESTHETIC AGENT AROUND LUMBAR SPINAL NERVE ROOT BY TRANSFORAMINAL APPROACH Bilateral 2022    Procedure: Bilateral L3/4 TF XUAN with RN IV sedation;  Surgeon: Philipp Demarco MD;  [facility];  Service: Pain Management;  Laterality: Bilateral;    SPINE SURGERY      fusion    TONSILLECTOMY           Family History:  Family History   Problem Relation Age of Onset    Heart attack Mother     Diabetes Mother     Heart disease Mother     Cataracts Mother     Stroke Father     Heart disease Father     Heart disease Brother        Social History:  Social History     Tobacco Use    Smoking status: Former Smoker     Packs/day: 1.50     Years: 20.00     Pack years: 30.00     Types: Cigarettes     Start date:      Quit date:      Years since quittin.4    Smokeless tobacco: Never Used   Substance and Sexual Activity    Alcohol use: No    Drug use: No    Sexual activity: Not Currently        Review of Systems     Review of Systems   Constitutional: Negative for fatigue and fever.   HENT: Negative for sore throat.    Respiratory: Negative for shortness of breath.    Cardiovascular: Negative for chest pain and palpitations.   Gastrointestinal: Negative for nausea.   Genitourinary: Negative for dysuria.   Musculoskeletal: Negative for back pain.   Skin: Negative for rash.   Neurological: Negative for dizziness, weakness, numbness and headaches.   Hematological: Does not bruise/bleed easily.   All other systems reviewed and are negative.     Physical Exam     Initial Vitals [22 1558]   BP Pulse Resp Temp SpO2   (!)  99/45 86 18 98 °F (36.7 °C) 100 %      MAP       --          Physical Exam  Nursing Notes and Vital Signs Reviewed.  Constitutional: Patient is in no acute distress. Well-developed and well-nourished.  Head: Atraumatic. Normocephalic.  Eyes: PERRL. EOM intact. Conjunctivae are not pale. No scleral icterus.  ENT: Mucous membranes are moist. Oropharynx is clear and symmetric.    Neck: Supple. Full ROM. No lymphadenopathy.  Cardiovascular: Regular rate. Regular rhythm. No murmurs, rubs, or gallops. Distal pulses are 2+ and symmetric.  Pulmonary/Chest: No respiratory distress. Clear to auscultation bilaterally. No wheezing or rales.  Abdominal: Soft and non-distended.  There is no tenderness.  No rebound, guarding, or rigidity. Good bowel sounds.  Genitourinary: No CVA tenderness  Musculoskeletal: Moves all extremities. No obvious deformities. No edema. No calf tenderness.  Skin: Warm and dry. Pt is pale.   Neurological:  Alert, awake, and appropriate.  Normal speech.  No acute focal neurological deficits are appreciated.  Psychiatric: Normal affect. Good eye contact. Appropriate in content.     ED Course   Critical Care    Date/Time: 6/13/2022 7:46 PM  Performed by: Diamond Mann MD  Authorized by: Diamond Mann MD   Direct patient critical care time: 10 minutes  Additional history critical care time: 5 minutes  Ordering / reviewing critical care time: 10 minutes  Documentation critical care time: 5 minutes  Consulting other physicians critical care time: 5 minutes  Total critical care time (exclusive of procedural time) : 35 minutes  Critical care time was exclusive of separately billable procedures and treating other patients and teaching time.  Critical care was necessary to treat or prevent imminent or life-threatening deterioration of the following conditions: acute on chronic anemia.  Critical care was time spent personally by me on the following activities: blood draw for specimens, development of  treatment plan with patient or surrogate, interpretation of cardiac output measurements, evaluation of patient's response to treatment, examination of patient, obtaining history from patient or surrogate, ordering and performing treatments and interventions, ordering and review of laboratory studies, pulse oximetry, re-evaluation of patient's condition and review of old charts.        ED Vital Signs:  Vitals:    06/13/22 1558 06/13/22 1907 06/13/22 1914   BP: (!) 99/45 133/60    Pulse: 86 66    Resp: 18 18    Temp: 98 °F (36.7 °C)  98.2 °F (36.8 °C)   TempSrc: Oral  Oral   SpO2: 100% 100%    Weight: 85.3 kg (188 lb 2.6 oz)         Abnormal Lab Results:  Labs Reviewed   TYPE & SCREEN   PREPARE RBC SOFT        All Lab Results:  Results for orders placed or performed during the hospital encounter of 06/13/22   Type & Screen   Result Value Ref Range    Group & Rh O POS     Indirect Keshav NEG    Prepare RBC 2 Units; symptomatic anemia, on eliqius   Result Value Ref Range    UNIT NUMBER [id number]     Product Code [id number]     DISPENSE STATUS ISSUED     CODING SYSTEM [id number]     Unit Blood Type Code 5100     Unit Blood Type O POS     Unit Expiration [id number]     UNIT NUMBER [id number]     Product Code [id number]     DISPENSE STATUS CROSSMATCHED     CODING SYSTEM [id number]     Unit Blood Type Code 5100     Unit Blood Type O POS     Unit Expiration [id number]      *Note: Due to a large number of results and/or encounters for the requested time period, some results have not been displayed. A complete set of results can be found in Results Review.       Imaging Results:  Imaging Results    None                 The Emergency Provider reviewed the vital signs and test results, which are outlined above.     ED Discussion       7:47 PM: Reassessed pt at this time. Discussed with pt all pertinent ED information and results. Discussed pt dx and plan of tx. Gave pt all f/u and return to the ED instructions. All questions  and concerns were addressed at this time. Pt expresses understanding of information and instructions, and is comfortable with plan to discharge. Pt is stable for discharge.    I discussed with patient and/or family/caretaker that evaluation in the ED does not suggest any emergent or life threatening medical conditions requiring immediate intervention beyond what was provided in the ED, and I believe patient is safe for discharge.  Regardless, an unremarkable evaluation in the ED does not preclude the development or presence of a serious of life threatening condition. As such, patient was instructed to return immediately for any worsening or change in current symptoms.         Medical Decision Making:   Clinical Tests:   Lab Tests: Ordered and Reviewed           ED Medication(s):  Medications   0.9%  NaCl infusion (for blood administration) (has no administration in time range)       New Prescriptions    No medications on file        Follow-up Information     [facility]. Schedule an appointment as soon as possible for a visit in 2 days.    Specialty: Hematology and Oncology  Why: Return to the Emergency Room, If symptoms worsen  Contact information:  [address] [place] [address] [id number] [phone]                           Scribe Attestation:   Scribe #1: I performed the above scribed service and the documentation accurately describes the services I performed. I attest to the accuracy of the note.     Attending:   Physician Attestation Statement for Scribe #1: I, Diamond Mann MD, personally performed the services described in this documentation, as scribed by Papa Murphy, in my presence, and it is both accurate and complete.           Clinical Impression       ICD-10-CM ICD-9-CM   1. Acute on chronic anemia  D64.9 285.9       Disposition:   Disposition: Discharged  Condition: Stable         Diamond Mann MD  06/13/22 1953

## 2022-06-14 NOTE — TELEPHONE ENCOUNTER
Received call from patient regarding scheduled Procrit appointment and Oncology visit on Thursday 6/16 morning before Pacemaker battery change.  Advised of arrival time between 830-9am and spoke with Dr. Ross's Staff Amol and will try and expedite injection earlier than the 10:15 posted time.  He did receive 2 units of blood yesterday due to drop in H&H from 9.4 to 6.5, K+ 4.0, Creatinine 2.0, Platelets 424  Patient is ready to proceed as scheduled unless Dr. Ross's CBC recheck says postpone, Anemia thought to be from Eliquis.

## 2022-06-15 DIAGNOSIS — I50.32 CHRONIC DIASTOLIC CONGESTIVE HEART FAILURE: ICD-10-CM

## 2022-06-15 DIAGNOSIS — I51.7 CARDIOMEGALY: ICD-10-CM

## 2022-06-15 DIAGNOSIS — Z95.0 PRESENCE OF CARDIAC RESYNCHRONIZATION THERAPY PACEMAKER (CRT-P): Primary | ICD-10-CM

## 2022-06-15 DIAGNOSIS — Z45.010 PACEMAKER GENERATOR END OF LIFE: ICD-10-CM

## 2022-06-15 RX ORDER — SODIUM CHLORIDE 0.9 % (FLUSH) 0.9 %
10 SYRINGE (ML) INJECTION
Status: CANCELLED | OUTPATIENT
Start: 2022-06-16

## 2022-06-16 ENCOUNTER — INFUSION (OUTPATIENT)
Dept: INFUSION THERAPY | Facility: HOSPITAL | Age: 82
End: 2022-06-16
Attending: INTERNAL MEDICINE
Payer: MEDICARE

## 2022-06-16 ENCOUNTER — HOSPITAL ENCOUNTER (OUTPATIENT)
Facility: HOSPITAL | Age: 82
Discharge: HOME | End: 2022-06-16
Attending: INTERNAL MEDICINE | Admitting: INTERNAL MEDICINE
Payer: MEDICARE

## 2022-06-16 ENCOUNTER — ANESTHESIA (OUTPATIENT)
Dept: CARDIOLOGY | Facility: HOSPITAL | Age: 82
End: 2022-06-16
Payer: MEDICARE

## 2022-06-16 ENCOUNTER — PATIENT MESSAGE (OUTPATIENT)
Dept: CARDIOLOGY | Facility: HOSPITAL | Age: 82
End: 2022-06-16
Payer: MEDICARE

## 2022-06-16 ENCOUNTER — ANESTHESIA EVENT (OUTPATIENT)
Dept: CARDIOLOGY | Facility: HOSPITAL | Age: 82
End: 2022-06-16
Payer: MEDICARE

## 2022-06-16 ENCOUNTER — OFFICE VISIT (OUTPATIENT)
Dept: HEMATOLOGY/ONCOLOGY | Facility: CLINIC | Age: 82
End: 2022-06-16
Payer: MEDICARE

## 2022-06-16 VITALS
DIASTOLIC BLOOD PRESSURE: 65 MMHG | RESPIRATION RATE: 18 BRPM | SYSTOLIC BLOOD PRESSURE: 113 MMHG | TEMPERATURE: 97 F | HEART RATE: 87 BPM | OXYGEN SATURATION: 100 %

## 2022-06-16 VITALS
HEART RATE: 87 BPM | TEMPERATURE: 97 F | DIASTOLIC BLOOD PRESSURE: 65 MMHG | BODY MASS INDEX: 26.86 KG/M2 | SYSTOLIC BLOOD PRESSURE: 113 MMHG | WEIGHT: 187.63 LBS | OXYGEN SATURATION: 100 % | HEIGHT: 70 IN

## 2022-06-16 VITALS
DIASTOLIC BLOOD PRESSURE: 55 MMHG | SYSTOLIC BLOOD PRESSURE: 113 MMHG | WEIGHT: 188.06 LBS | RESPIRATION RATE: 22 BRPM | TEMPERATURE: 97 F | HEIGHT: 70 IN | HEART RATE: 59 BPM | BODY MASS INDEX: 26.92 KG/M2 | OXYGEN SATURATION: 100 %

## 2022-06-16 DIAGNOSIS — Z95.0 PRESENCE OF CARDIAC RESYNCHRONIZATION THERAPY PACEMAKER (CRT-P): ICD-10-CM

## 2022-06-16 DIAGNOSIS — Z45.010 PACEMAKER GENERATOR END OF LIFE: ICD-10-CM

## 2022-06-16 DIAGNOSIS — D63.1 ANEMIA ASSOCIATED WITH STAGE 4 CHRONIC RENAL FAILURE: Primary | ICD-10-CM

## 2022-06-16 DIAGNOSIS — N18.4 ANEMIA IN STAGE 4 CHRONIC KIDNEY DISEASE: ICD-10-CM

## 2022-06-16 DIAGNOSIS — I51.7 CARDIOMEGALY: ICD-10-CM

## 2022-06-16 DIAGNOSIS — N18.4 ANEMIA ASSOCIATED WITH STAGE 4 CHRONIC RENAL FAILURE: Primary | ICD-10-CM

## 2022-06-16 DIAGNOSIS — D63.1 ANEMIA IN STAGE 4 CHRONIC KIDNEY DISEASE: ICD-10-CM

## 2022-06-16 DIAGNOSIS — I50.32 CHRONIC DIASTOLIC CONGESTIVE HEART FAILURE: ICD-10-CM

## 2022-06-16 DIAGNOSIS — C61 ADENOCARCINOMA OF PROSTATE: Primary | ICD-10-CM

## 2022-06-16 DIAGNOSIS — I49.9 ARRHYTHMIA: ICD-10-CM

## 2022-06-16 LAB
ANION GAP SERPL CALC-SCNC: 10 MMOL/L (ref 8–16)
BUN SERPL-MCNC: 31 MG/DL (ref 8–23)
CALCIUM SERPL-MCNC: 8.7 MG/DL (ref 8.7–10.5)
CHLORIDE SERPL-SCNC: 107 MMOL/L (ref 95–110)
CO2 SERPL-SCNC: 22 MMOL/L (ref 23–29)
CREAT SERPL-MCNC: 1.6 MG/DL (ref 0.5–1.4)
EST. GFR  (AFRICAN AMERICAN): 47 ML/MIN/1.73 M^2
EST. GFR  (NON AFRICAN AMERICAN): 40 ML/MIN/1.73 M^2
GLUCOSE SERPL-MCNC: 106 MG/DL (ref 70–110)
INR PPP: 1.1 (ref 0.8–1.2)
POCT GLUCOSE: 121 MG/DL (ref 70–110)
POTASSIUM SERPL-SCNC: 4.3 MMOL/L (ref 3.5–5.1)
PROTHROMBIN TIME: 11.3 SEC (ref 9–12.5)
SODIUM SERPL-SCNC: 139 MMOL/L (ref 136–145)

## 2022-06-16 PROCEDURE — 33229 PR REMV&REPLC PM GEN MULT LEADS: ICD-10-PCS | Mod: ,,, | Performed by: INTERNAL MEDICINE

## 2022-06-16 PROCEDURE — 85610 PROTHROMBIN TIME: CPT | Performed by: INTERNAL MEDICINE

## 2022-06-16 PROCEDURE — 1101F PT FALLS ASSESS-DOCD LE1/YR: CPT | Mod: CPTII,S$GLB,, | Performed by: INTERNAL MEDICINE

## 2022-06-16 PROCEDURE — 99999 PR PBB SHADOW E&M-EST. PATIENT-LVL IV: CPT | Mod: PBBFAC,,, | Performed by: INTERNAL MEDICINE

## 2022-06-16 PROCEDURE — 1126F AMNT PAIN NOTED NONE PRSNT: CPT | Mod: CPTII,S$GLB,, | Performed by: INTERNAL MEDICINE

## 2022-06-16 PROCEDURE — 25000003 PHARM REV CODE 250: Performed by: STUDENT IN AN ORGANIZED HEALTH CARE EDUCATION/TRAINING PROGRAM

## 2022-06-16 PROCEDURE — 1101F PR PT FALLS ASSESS DOC 0-1 FALLS W/OUT INJ PAST YR: ICD-10-PCS | Mod: CPTII,S$GLB,, | Performed by: INTERNAL MEDICINE

## 2022-06-16 PROCEDURE — C2621 PMKR, OTHER THAN SING/DUAL: HCPCS | Performed by: INTERNAL MEDICINE

## 2022-06-16 PROCEDURE — 37000009 HC ANESTHESIA EA ADD 15 MINS: Performed by: INTERNAL MEDICINE

## 2022-06-16 PROCEDURE — 3074F SYST BP LT 130 MM HG: CPT | Mod: CPTII,S$GLB,, | Performed by: INTERNAL MEDICINE

## 2022-06-16 PROCEDURE — 3288F PR FALLS RISK ASSESSMENT DOCUMENTED: ICD-10-PCS | Mod: CPTII,S$GLB,, | Performed by: INTERNAL MEDICINE

## 2022-06-16 PROCEDURE — 99214 PR OFFICE/OUTPT VISIT, EST, LEVL IV, 30-39 MIN: ICD-10-PCS | Mod: S$GLB,,, | Performed by: INTERNAL MEDICINE

## 2022-06-16 PROCEDURE — 93010 ELECTROCARDIOGRAM REPORT: CPT | Mod: 76,,, | Performed by: INTERNAL MEDICINE

## 2022-06-16 PROCEDURE — 3072F PR LOW RISK FOR RETINOPATHY: ICD-10-PCS | Mod: CPTII,S$GLB,, | Performed by: INTERNAL MEDICINE

## 2022-06-16 PROCEDURE — 93005 ELECTROCARDIOGRAM TRACING: CPT | Mod: 59

## 2022-06-16 PROCEDURE — 27201423 OPTIME MED/SURG SUP & DEVICES STERILE SUPPLY: Performed by: INTERNAL MEDICINE

## 2022-06-16 PROCEDURE — 63600175 PHARM REV CODE 636 W HCPCS: Performed by: INTERNAL MEDICINE

## 2022-06-16 PROCEDURE — 96372 THER/PROPH/DIAG INJ SC/IM: CPT

## 2022-06-16 PROCEDURE — 99499 NO LOS: ICD-10-PCS | Mod: ,,, | Performed by: INTERNAL MEDICINE

## 2022-06-16 PROCEDURE — 1126F PR PAIN SEVERITY QUANTIFIED, NO PAIN PRESENT: ICD-10-PCS | Mod: CPTII,S$GLB,, | Performed by: INTERNAL MEDICINE

## 2022-06-16 PROCEDURE — 3288F FALL RISK ASSESSMENT DOCD: CPT | Mod: CPTII,S$GLB,, | Performed by: INTERNAL MEDICINE

## 2022-06-16 PROCEDURE — 99214 OFFICE O/P EST MOD 30 MIN: CPT | Mod: S$GLB,,, | Performed by: INTERNAL MEDICINE

## 2022-06-16 PROCEDURE — 63600175 PHARM REV CODE 636 W HCPCS: Mod: JG | Performed by: NURSE PRACTITIONER

## 2022-06-16 PROCEDURE — 33229 REMV&REPLC PM GEN MULT LEADS: CPT | Mod: ,,, | Performed by: INTERNAL MEDICINE

## 2022-06-16 PROCEDURE — 93010 EKG 12-LEAD: ICD-10-PCS | Mod: 76,,, | Performed by: INTERNAL MEDICINE

## 2022-06-16 PROCEDURE — 99499 UNLISTED E&M SERVICE: CPT | Mod: ,,, | Performed by: INTERNAL MEDICINE

## 2022-06-16 PROCEDURE — C1882 AICD, OTHER THAN SING/DUAL: HCPCS | Performed by: INTERNAL MEDICINE

## 2022-06-16 PROCEDURE — 63600175 PHARM REV CODE 636 W HCPCS: Performed by: STUDENT IN AN ORGANIZED HEALTH CARE EDUCATION/TRAINING PROGRAM

## 2022-06-16 PROCEDURE — 99999 PR PBB SHADOW E&M-EST. PATIENT-LVL IV: ICD-10-PCS | Mod: PBBFAC,,, | Performed by: INTERNAL MEDICINE

## 2022-06-16 PROCEDURE — 3078F DIAST BP <80 MM HG: CPT | Mod: CPTII,S$GLB,, | Performed by: INTERNAL MEDICINE

## 2022-06-16 PROCEDURE — 3072F LOW RISK FOR RETINOPATHY: CPT | Mod: CPTII,S$GLB,, | Performed by: INTERNAL MEDICINE

## 2022-06-16 PROCEDURE — 25000003 PHARM REV CODE 250: Performed by: INTERNAL MEDICINE

## 2022-06-16 PROCEDURE — 33229 REMV&REPLC PM GEN MULT LEADS: CPT | Performed by: INTERNAL MEDICINE

## 2022-06-16 PROCEDURE — 3078F PR MOST RECENT DIASTOLIC BLOOD PRESSURE < 80 MM HG: ICD-10-PCS | Mod: CPTII,S$GLB,, | Performed by: INTERNAL MEDICINE

## 2022-06-16 PROCEDURE — 80048 BASIC METABOLIC PNL TOTAL CA: CPT | Performed by: INTERNAL MEDICINE

## 2022-06-16 PROCEDURE — 37000008 HC ANESTHESIA 1ST 15 MINUTES: Performed by: INTERNAL MEDICINE

## 2022-06-16 PROCEDURE — 3074F PR MOST RECENT SYSTOLIC BLOOD PRESSURE < 130 MM HG: ICD-10-PCS | Mod: CPTII,S$GLB,, | Performed by: INTERNAL MEDICINE

## 2022-06-16 DEVICE — CRTP W1TR01 PERCEPTA CRTP MRI US
Type: IMPLANTABLE DEVICE | Site: CHEST | Status: FUNCTIONAL
Brand: PERCEPTA™ CRT-P MRI SURESCAN™

## 2022-06-16 RX ORDER — DEXAMETHASONE SODIUM PHOSPHATE 4 MG/ML
INJECTION, SOLUTION INTRA-ARTICULAR; INTRALESIONAL; INTRAMUSCULAR; INTRAVENOUS; SOFT TISSUE
Status: DISCONTINUED | OUTPATIENT
Start: 2022-06-16 | End: 2022-06-16

## 2022-06-16 RX ORDER — CEFAZOLIN SODIUM 2 G/50ML
2 SOLUTION INTRAVENOUS
Status: COMPLETED | OUTPATIENT
Start: 2022-06-16 | End: 2022-06-16

## 2022-06-16 RX ORDER — KETAMINE HYDROCHLORIDE 50 MG/ML
INJECTION, SOLUTION INTRAMUSCULAR; INTRAVENOUS
Status: DISCONTINUED | OUTPATIENT
Start: 2022-06-16 | End: 2022-06-16

## 2022-06-16 RX ORDER — PROPOFOL 10 MG/ML
VIAL (ML) INTRAVENOUS CONTINUOUS PRN
Status: DISCONTINUED | OUTPATIENT
Start: 2022-06-16 | End: 2022-06-16

## 2022-06-16 RX ORDER — MIDAZOLAM HYDROCHLORIDE 1 MG/ML
INJECTION, SOLUTION INTRAMUSCULAR; INTRAVENOUS
Status: DISCONTINUED | OUTPATIENT
Start: 2022-06-16 | End: 2022-06-16

## 2022-06-16 RX ORDER — LIDOCAINE HYDROCHLORIDE 20 MG/ML
INJECTION, SOLUTION INFILTRATION; PERINEURAL
Status: DISCONTINUED | OUTPATIENT
Start: 2022-06-16 | End: 2022-06-16 | Stop reason: HOSPADM

## 2022-06-16 RX ORDER — CEFADROXIL 500 MG/1
500 CAPSULE ORAL EVERY 12 HOURS
Qty: 6 CAPSULE | Refills: 0 | Status: SHIPPED | OUTPATIENT
Start: 2022-06-16 | End: 2022-06-17 | Stop reason: SDUPTHER

## 2022-06-16 RX ORDER — FENTANYL CITRATE 50 UG/ML
INJECTION, SOLUTION INTRAMUSCULAR; INTRAVENOUS
Status: DISCONTINUED | OUTPATIENT
Start: 2022-06-16 | End: 2022-06-16

## 2022-06-16 RX ORDER — ONDANSETRON 2 MG/ML
INJECTION INTRAMUSCULAR; INTRAVENOUS
Status: DISCONTINUED | OUTPATIENT
Start: 2022-06-16 | End: 2022-06-16

## 2022-06-16 RX ORDER — LIDOCAINE HYDROCHLORIDE 10 MG/ML
INJECTION, SOLUTION EPIDURAL; INFILTRATION; INTRACAUDAL; PERINEURAL
Status: DISCONTINUED | OUTPATIENT
Start: 2022-06-16 | End: 2022-06-16

## 2022-06-16 RX ORDER — SODIUM CHLORIDE 0.9 % (FLUSH) 0.9 %
10 SYRINGE (ML) INJECTION
Status: DISCONTINUED | OUTPATIENT
Start: 2022-06-16 | End: 2022-06-16 | Stop reason: HOSPADM

## 2022-06-16 RX ADMIN — ONDANSETRON 4 MG: 2 INJECTION, SOLUTION INTRAMUSCULAR; INTRAVENOUS at 02:06

## 2022-06-16 RX ADMIN — FENTANYL CITRATE 50 MCG: 50 INJECTION, SOLUTION INTRAMUSCULAR; INTRAVENOUS at 01:06

## 2022-06-16 RX ADMIN — DEXAMETHASONE SODIUM PHOSPHATE 4 MG: 4 INJECTION, SOLUTION INTRA-ARTICULAR; INTRALESIONAL; INTRAMUSCULAR; INTRAVENOUS; SOFT TISSUE at 01:06

## 2022-06-16 RX ADMIN — CEFAZOLIN SODIUM 2 G: 2 SOLUTION INTRAVENOUS at 01:06

## 2022-06-16 RX ADMIN — LIDOCAINE HYDROCHLORIDE 100 MG: 10 INJECTION, SOLUTION EPIDURAL; INFILTRATION; INTRACAUDAL; PERINEURAL at 01:06

## 2022-06-16 RX ADMIN — KETAMINE HYDROCHLORIDE 30 MG: 50 INJECTION, SOLUTION, CONCENTRATE INTRAMUSCULAR; INTRAVENOUS at 01:06

## 2022-06-16 RX ADMIN — EPOETIN ALFA-EPBX 40000 UNITS: 40000 INJECTION, SOLUTION INTRAVENOUS; SUBCUTANEOUS at 08:06

## 2022-06-16 RX ADMIN — PROPOFOL 50 MCG/KG/MIN: 10 INJECTION, EMULSION INTRAVENOUS at 01:06

## 2022-06-16 RX ADMIN — MIDAZOLAM 2 MG: 1 INJECTION INTRAMUSCULAR; INTRAVENOUS at 01:06

## 2022-06-16 NOTE — TRANSFER OF CARE
"Anesthesia Transfer of Care Note    Patient: Jake Ortiz    Procedure(s) Performed: Procedure(s) (LRB):  REPLACEMENT, PULSE GENERATOR, CARDIAC PACEMAKER/CRT-P device (Left)    Patient location: Cath Lab    Anesthesia Type: MAC    Transport from OR: Transported from OR on room air with adequate spontaneous ventilation    Post pain: adequate analgesia    Post assessment: no apparent anesthetic complications and tolerated procedure well    Post vital signs: stable    Level of consciousness: awake, responds to stimulation and alert    Nausea/Vomiting: no nausea/vomiting    Complications: none    Transfer of care protocol was followed      Last vitals:   Visit Vitals  BP (!) 114/57 (BP Location: Left arm, Patient Position: Lying)   Pulse 89   Temp 36.4 °C (97.5 °F) (Temporal)   Resp 18   Ht 5' 10" (1.778 m)   Wt 85.3 kg (188 lb 0.8 oz)   SpO2 100%   BMI 26.98 kg/m²     "

## 2022-06-16 NOTE — Clinical Note
There was an existing generator. The generator was removed. The leads were disconnected. The generator was returned to . The generator was returned due to ABY/EOL

## 2022-06-16 NOTE — Clinical Note
Return in a week with labs for possible blood transfusions.  Please reserve a slot in the infusion suite for transfusions just in case. I will do a virtual visit with him from [place]

## 2022-06-16 NOTE — ANESTHESIA PREPROCEDURE EVALUATION
06/16/2022  Jake Ortiz is a 81 y.o., male.    Pre-op Assessment    I have reviewed the Patient Summary Reports.    I have reviewed the Nursing Notes. I have reviewed the NPO Status.   I have reviewed the Medications.     Review of Systems  Anesthesia Hx:  No problems with previous Anesthesia    Social:  No Alcohol Use, Former Smoker    Hematology/Oncology:         -- Anemia: --  Cancer in past history:  Other (see Oncology comments) radiation  Oncology Comments: prostate     EENT/Dental:EENT/Dental Normal   Cardiovascular:   Pacemaker Hypertension Past MI CAD  Dysrhythmias atrial fibrillation CHF hyperlipidemia ECHO 06/2020    ·Concentric left ventricular hypertrophy.  ·Left ventricular systolic function. The estimated ejection fraction is 50%.  ·Indeterminate left ventricular diastolic function.  Severe left atrial enlargement.  Mild-to-moderate mitral regurgitation.  Mild to moderate tricuspid regurgitation.    Ventricular-paced rhythm   Biventricular pacemaker detected   Abnormal ECG   When compared with ECG of 27-SEP-2017 11:09,   Previous ECG has undetermined rhythm, needs review   Confirmed by JACI DAVILA MD ([id number]) on 7/17/2020 6:35:38 PM Congestive Heart Failure (CHF)  Hypertension    Pulmonary:   COPD Sleep Apnea, CPAP    Education provided regarding risk of obstructive sleep apnea     Renal/:   Chronic Renal Disease, CRI    Hepatic/GI:   Bowel Prep. Denies GERD.    Musculoskeletal:   Arthritis   Spine Disorders: lumbar Chronic Pain    Neurological:   Neuromuscular Disease,    Endocrine:   Diabetes  Diabetes    Dermatological:  Skin Normal    Psych:  Psychiatric Normal           Physical Exam  General:  Well nourished      Airway/Jaw/Neck:  Airway Findings: Mouth Opening: Normal   Tongue: Normal   General Airway Assessment: Adult Mallampati: II  TM Distance: Normal, at least 6 cm   Jaw/Neck  Findings:  Neck ROM: Normal ROM       Dental:  Dental Findings: Upper Dentures     Chest/Lungs:  Chest/Lungs Findings: Clear to auscultation, Normal Respiratory Rate      Heart/Vascular:  Heart Findings: Rate: Normal        Mental Status:  Mental Status Findings:  Cooperative, Alert and Oriented         Anesthesia Plan  Type of Anesthesia, risks & benefits discussed:  Anesthesia Type:  MAC    Patient's Preference:   Plan Factors:          Intra-op Monitoring Plan: standard ASA monitors  Intra-op Monitoring Plan Comments:   Post Op Pain Control Plan: multimodal analgesia  Post Op Pain Control Plan Comments:     Induction:   IV  Beta Blocker:  Patient is on a Beta-Blocker and has received one dose within the past 24 hours (No further documentation required).       Informed Consent: Informed consent signed with the Patient and all parties understand the risks and agree with anesthesia plan.  All questions answered.  Anesthesia consent signed with patient.  ASA Score: 3     Day of Surgery Review of History & Physical: I have interviewed and examined the patient. I have reviewed the patient's H&P dated:  There are no significant changes.  H&P Update referred to the surgeon/provider.          Ready For Surgery From Anesthesia Perspective.           Physical Exam  General: Well nourished    Airway:  Mallampati: II   Mouth Opening: Normal  TM Distance: Normal, at least 6 cm  Tongue: Normal  Neck ROM: Normal ROM    Dental:  Upper Dentures    Chest/Lungs:  Clear to auscultation, Normal Respiratory Rate    Heart:  Rate: Normal          Anesthesia Plan  Type of Anesthesia, risks & benefits discussed:    Anesthesia Type: MAC  Intra-op Monitoring Plan: standard ASA monitors  Post Op Pain Control Plan: multimodal analgesia  Induction:  IV  Informed Consent: Informed consent signed with the Patient and all parties understand the risks and agree with anesthesia plan.  All questions answered.   ASA Score: 3  Day of Surgery Review of  History & Physical: H&P Update referred to the surgeon/provider.I have interviewed and examined the patient. I have reviewed the patient's H&P dated:     Ready For Surgery From Anesthesia Perspective.       .

## 2022-06-16 NOTE — INTERVAL H&P NOTE
The patient has been examined and the H&P has been reviewed:  There have been no significant changes since the above clinic visit was completed. Patient has not had signs or symptoms of intercurrent infections and there has been no significant change in their cardiovascular functionality.   I have discussed the procedure in detail with the patient. I described its benefits and risks. I reviewed alternative therapies and discussed their potential value. The patient was given ample opportunity to express concerns and ask questions and I provided appropriate responses and  answers to such.The patient understands and agrees to proceed.  Consent form was signed by patient and myself and appropriately witnessed.   We will proceed today with PPM replacement as planned.         There are no hospital problems to display for this patient.

## 2022-06-16 NOTE — Clinical Note
The lead thresholds were tested.      The chronic LV, RA and RV lead was connected to a replacement chronic generator.

## 2022-06-16 NOTE — DISCHARGE INSTRUCTIONS
ACTIVITY: Avoid heavy lifting or straining for 6 weeks          You may shower after 2 days          No tub bath until incision site is healed                    Use ice pack x 48 hours                                           WOUND CARE: It is not unusual to have tenderness at the incision site. Remove the dressing as instructed by your physician    DISCOMFORT: For general discomfort at the incision site, ou may take over the counter acetaminophen as directed on the bottle.    SIGNS AND SYMPTOMS TO REPORT: Call your physician for the following:          Fever greater than 101          Pain not relieved by medication          Swelling, drainage, warmth, or redness at incision site          Shortness of breath                                                                                                                                                                                          Drowsiness that doesn't get better                 Weakness or dizziness that doesn't get better                 Repeated vomiting        IF YOU RECEIVED SEDATION TODAY, PLEASE FOLLOW THE  INSTRUCTIONS BELOW:     For the next 8 hours, you should be watched by a responsible adult. This person should make sure your condition is not getting worse.  Don't drink any alcohol for the next 24 hours.  Don't drive, operate dangerous machinery, or make important business or personal decisions during the next 24 hours.  Your healthcare provider may tell you not to take any medicine by mouth for pain or sleep in the next 4 hours. These medicines may react with the medicines you were given in the hospital. This could cause a much stronger response than usual.

## 2022-06-16 NOTE — DISCHARGE INSTRUCTIONS
Thank you for letting me take care of you     Thank you for choosing [facility] ~Leslie M.~     [facility]  [address] [facility]  [id number] [place] [address] Drive  [phone] phone     [phone] fax  Hours of Operation: Monday- Friday 8:00am- 5:00pm  After hours phone  [phone]  Hematology / Oncology Physicians on call    Dr. Portillo Swenson      Nurse Practitioners:    Natalie Ying, NEGIN Virgen, NEGIN Mckay, NEGIN Manzo, NP  Mary Jane Allen, PA      Please don't hesitate to call if you have any concerns.      .FALL PREVENTION   Falls often occur due to slipping, tripping or losing your balance. Here are ways to reduce your risk of falling again.   Was there anything that caused your fall that can be fixed, removed or replaced?   Make your home safe by keeping walkways clear of objects you may trip over.   Use non-slip pads under rugs.   Do not walk in poorly lit areas.   Do not stand on chairs or wobbly ladders.   Use caution when reaching overhead or looking upward. This position can cause a loss of balance.   Be sure your shoes fit properly, have non-slip bottoms and are in good condition.   Be cautious when going up and down stairs, curbs, and when walking on uneven sidewalks.   If your balance is poor, consider using a cane or walker.   If your fall was related to alcohol use, stop or limit alcohol intake.   If your fall was related to use of sleeping medicines, talk to your doctor about this. You may need to reduce your dosage at bedtime if you awaken during the night to go to the bathroom.   To reduce the need for nighttime bathroom trips:   Avoid drinking fluids for several hours before going to bed   Empty your bladder before going to bed   Men can keep a urinal at the bedside   © 1787-9024 [employer], [address] [place] [address], [place] [address]. All rights reserved. This information is not intended as a substitute  for professional medical care. Always follow your healthcare professional's instructions.  .WAYS TO HELP PREVENT INFECTION        WASH YOUR HANDS OFTEN DURING THE DAY, ESPECIALLY BEFORE YOU EAT, AFTER USING THE BATHROOM, AND AFTER TOUCHING ANIMALS    STAY AWAY FROM PEOPLE WHO HAVE ILLNESSES YOU CAN CATCH; SUCH AS COLDS, FLU, CHICKEN POX    TRY TO AVOID CROWDS    STAY AWAY FROM CHILDREN WHO RECENTLY HAVE RECEIVED LIVE VIRUS VACCINES    MAINTAIN GOOD MOUTH CARE    DO NOT SQUEEZE OR SCRATCH PIMPLES    CLEAN CUTS & SCRAPES RIGHT AWAY AND DAILY UNTIL HEALED WITH WARM WATER, SOAP & AN ANTISEPTIC    AVOID CONTACT WITH LITTER BOXES, BIRD CAGES, & FISH TANKS    AVOID STANDING WATER, IE., BIRD BATHS, FLOWER POTS/VASES, OR HUMIDIFIERS    WEAR GLOVES WHEN GARDENING OR CLEANING UP AFTER OTHERS, ESPECIALLY BABIES & SMALL CHILDREN    DO NOT EAT RAW FISH, SEAFOOD, MEAT, OR EGGS

## 2022-06-16 NOTE — ANESTHESIA POSTPROCEDURE EVALUATION
Anesthesia Post Evaluation    Patient: Jake Ortiz    Procedure(s) Performed: Procedure(s) (LRB):  REPLACEMENT, PULSE GENERATOR, CARDIAC PACEMAKER/CRT-P device (Left)    Final Anesthesia Type: MAC      Patient location during evaluation: Cath Lab  Patient participation: Yes- Able to Participate  Level of consciousness: awake and alert and oriented  Post-procedure vital signs: reviewed and stable  Pain management: adequate  Airway patency: patent  NASREEN mitigation strategies: Multimodal analgesia  PONV status at discharge: No PONV  Anesthetic complications: no      Cardiovascular status: blood pressure returned to baseline  Respiratory status: unassisted  Hydration status: euvolemic  Follow-up not needed.          Vitals Value Taken Time   BP  06/16/22 1437   Temp  06/16/22 1437   Pulse  06/16/22 1437   Resp  06/16/22 1437   SpO2  06/16/22 1437         No case tracking events are documented in the log.      Pain/Thomas Score: Thomas Score: 9 (6/16/2022  2:28 PM)

## 2022-06-16 NOTE — PROGRESS NOTES
Subjective:       Patient ID: Jake Ortiz is a 81 y.o. male.    Chief Complaint: No chief complaint on file.    HPI   Mr Ortiz presents today for follow-up.  This is the 3rd time that I am seeing him.  My initial visit with him was 2 week ago, on June 2, 2022. Prior to that he had been seen by our nurse practitioner on 04/22/2022 and prior to that again by the nurse practitioner on 03/22/2022.  He was also seen by Dr. Nath on 01/05/2022.  Briefly, he is a 81-year-old male with a remote history of prostate adenocarcinoma and also anemia for which he received iron infusions in April, and has been on erythropoietin.  The current erythropoietin dose is 40,000 units weekly.   Earlier this week he received 1 units of blood due to his Hg being 6.5 grams/dL.    His CBC today shows a white count of 4,500 per cubic mm, hemoglobin 7.3 grams/deciliter, hematocrit 24.4 %, MCV95, and platelets 376,000 per cubic mm.   His most recent colonoscopy in September 2020 had shown internal hemorrhoids and a small polyp.  His upper endoscopy on the same day had shown mild gastritis.  A video capsule swallow in late December 2021 was unremarkable.    Review of Systems    Overall he feels well.  He states that his stamina increased since receiving the blood transfusions.  He denies seeing any blood in the stools, black stools or blood in the urine.  Furthermore, he  denies any anxiety, depression, easy bruising, fevers, chills, night  sweats, weight loss, nausea, vomiting, diarrhea, constipation, diplopia, blurred vision, headache, chest pain, palpitations, shortness of breath, breast pain, abdominal pain, extremity pain, or difficulty ambulating.  The remainder of the ten-point ROS, including general, skin, lymph, H/N, cardiorespiratory, GI, , Neuro, Endocrine, and psychiatric is negative.     Objective:      Physical Exam    He is alert, oriented to time, place, person, pleasant, well      nourished, in no acute physical distress.                                     VITAL SIGNS:  Reviewed                                      HEENT:  Normal.  There are no nasal, oral, lip, gingival, auricular, lid,    or conjunctival lesions.  Mucosae are moist and pink, and there is no        thrush.  Pupils are equal, reactive to light and accommodation.              Extraocular muscle movements are intact.    Dentition is good.  There is no frontal or maxillary tenderness.                                   NECK:  Supple without JVD, adenopathy, or thyromegaly.                       LUNGS:  Clear to auscultation without wheezing, rales, or rhonchi.           CARDIOVASCULAR:  Reveals an S1, S2, no murmurs, no rubs, no gallops.         ABDOMEN:  Soft, nontender, without organomegaly.  Bowel sounds are    present.                                                                     EXTREMITIES:  No cyanosis, clubbing, or edema.                                                              LYMPHATIC:  There is no cervical, axillary, or supraclavicular adenopathy.   SKIN:  Warm and moist, without petechiae, rashes, induration, or ecchymoses.           NEUROLOGIC:  DTRs are 0-1+ bilaterally, symmetrical, motor function is 5/5,  and cranial nerves are  within normal limits.  Assessment:       Problem List Items Addressed This Visit     Remote history of adenocarcinoma of prostate. PSA was undetectable in 2/2022           Anemia. Etiology unclear; most likely multifactorial.  Patient is currently receiving weekly erythropoietin and he received one unit of PRBCs earlier this week.        Plan:         Patient may receive his erythropoietin injection today.  He should remain on weekly erythropoietin and I will see him in a week with a repeat CBC, a type and crossmatch, a direct Keshav test, and LDH/haptoglobin.    Since I will not be physically here next week, we will arrange for a virtual visit.  Finally, he was given 3 stool cards and was asked to submit 3 stool samples  at the time of his next visit in a week.  If his anemia persists despite the erythropoietin, he will probably need a bone marrow biopsy to rule out MDS.  His multiple questions were answered to his satisfaction.

## 2022-06-16 NOTE — Clinical Note
The chest was prepped. The site was prepped with ChloraPrep. The site was clipped. The patient was draped. The patient was positioned supine. The patient was secured using safety straps.

## 2022-06-17 LAB
BLD PROD TYP BPU: NORMAL
BLD PROD TYP BPU: NORMAL
BLOOD UNIT EXPIRATION DATE: NORMAL
BLOOD UNIT EXPIRATION DATE: NORMAL
BLOOD UNIT TYPE CODE: 5100
BLOOD UNIT TYPE CODE: 5100
BLOOD UNIT TYPE: NORMAL
BLOOD UNIT TYPE: NORMAL
CODING SYSTEM: NORMAL
CODING SYSTEM: NORMAL
DISPENSE STATUS: NORMAL
DISPENSE STATUS: NORMAL
NUM UNITS TRANS PACKED RBC: NORMAL
NUM UNITS TRANS PACKED RBC: NORMAL

## 2022-06-17 RX ORDER — CEFADROXIL 500 MG/1
500 CAPSULE ORAL EVERY 12 HOURS
Qty: 6 CAPSULE | Refills: 0 | Status: ON HOLD | OUTPATIENT
Start: 2022-06-17 | End: 2023-01-01 | Stop reason: HOSPADM

## 2022-06-20 ENCOUNTER — HOSPITAL ENCOUNTER (OUTPATIENT)
Dept: CARDIOLOGY | Facility: HOSPITAL | Age: 82
Discharge: HOME | End: 2022-06-20
Attending: INTERNAL MEDICINE
Payer: MEDICARE

## 2022-06-20 DIAGNOSIS — I48.0 PAROXYSMAL ATRIAL FIBRILLATION: ICD-10-CM

## 2022-06-20 DIAGNOSIS — I50.42 CHRONIC COMBINED SYSTOLIC AND DIASTOLIC CONGESTIVE HEART FAILURE: ICD-10-CM

## 2022-06-20 DIAGNOSIS — Z95.0 CARDIAC PACEMAKER IN SITU: ICD-10-CM

## 2022-06-20 PROCEDURE — 93281 PM DEVICE PROGR EVAL MULTI: CPT | Mod: 26,,, | Performed by: INTERNAL MEDICINE

## 2022-06-20 PROCEDURE — 93281 CARDIAC DEVICE CHECK - IN CLINIC & HOSPITAL: ICD-10-PCS | Mod: 26,,, | Performed by: INTERNAL MEDICINE

## 2022-06-20 PROCEDURE — 93281 PM DEVICE PROGR EVAL MULTI: CPT

## 2022-06-20 NOTE — DISCHARGE SUMMARY
[facility] - Cath Lab (Hospital)  Discharge Note  Short Stay    Procedure(s) (LRB):  REPLACEMENT, PULSE GENERATOR, CARDIAC PACEMAKER/CRT-P device (Left)    OUTCOME: Patient tolerated treatment/procedure well without complication and is now ready for discharge.    DISPOSITION: Home or Self Care    FINAL DIAGNOSIS:  CRT P at ABY.  Now status post replacement and reprogramming.    FOLLOWUP: In clinic    DISCHARGE INSTRUCTIONS:  No discharge procedures on file.     TIME SPENT ON DISCHARGE:   15 minutes

## 2022-06-22 ENCOUNTER — LAB VISIT (OUTPATIENT)
Dept: LAB | Facility: HOSPITAL | Age: 82
End: 2022-06-22
Attending: INTERNAL MEDICINE
Payer: MEDICARE

## 2022-06-22 DIAGNOSIS — N18.4 ANEMIA IN STAGE 4 CHRONIC KIDNEY DISEASE: ICD-10-CM

## 2022-06-22 DIAGNOSIS — C61 ADENOCARCINOMA OF PROSTATE: ICD-10-CM

## 2022-06-22 DIAGNOSIS — D63.1 ANEMIA IN STAGE 4 CHRONIC KIDNEY DISEASE: ICD-10-CM

## 2022-06-22 LAB
ABO + RH BLD: NORMAL
ALBUMIN SERPL BCP-MCNC: 3.7 G/DL (ref 3.5–5.2)
ALP SERPL-CCNC: 71 U/L (ref 55–135)
ALT SERPL W/O P-5'-P-CCNC: 10 U/L (ref 10–44)
ANION GAP SERPL CALC-SCNC: 10 MMOL/L (ref 8–16)
AST SERPL-CCNC: 18 U/L (ref 10–40)
BASOPHILS # BLD AUTO: 0.07 K/UL (ref 0–0.2)
BASOPHILS NFR BLD: 1.3 % (ref 0–1.9)
BILIRUB SERPL-MCNC: 0.4 MG/DL (ref 0.1–1)
BLD GP AB SCN CELLS X3 SERPL QL: NORMAL
BUN SERPL-MCNC: 32 MG/DL (ref 8–23)
CALCIUM SERPL-MCNC: 9.2 MG/DL (ref 8.7–10.5)
CHLORIDE SERPL-SCNC: 105 MMOL/L (ref 95–110)
CO2 SERPL-SCNC: 23 MMOL/L (ref 23–29)
CREAT SERPL-MCNC: 1.8 MG/DL (ref 0.5–1.4)
DIFFERENTIAL METHOD: ABNORMAL
EOSINOPHIL # BLD AUTO: 0.2 K/UL (ref 0–0.5)
EOSINOPHIL NFR BLD: 4 % (ref 0–8)
ERYTHROCYTE [DISTWIDTH] IN BLOOD BY AUTOMATED COUNT: 17.5 % (ref 11.5–14.5)
EST. GFR  (AFRICAN AMERICAN): 40 ML/MIN/1.73 M^2
EST. GFR  (NON AFRICAN AMERICAN): 35 ML/MIN/1.73 M^2
GLUCOSE SERPL-MCNC: 116 MG/DL (ref 70–110)
HAPTOGLOB SERPL-MCNC: 233 MG/DL (ref 30–250)
HCT VFR BLD AUTO: 25.8 % (ref 40–54)
HGB BLD-MCNC: 7.4 G/DL (ref 14–18)
IMM GRANULOCYTES # BLD AUTO: 0.02 K/UL (ref 0–0.04)
IMM GRANULOCYTES NFR BLD AUTO: 0.4 % (ref 0–0.5)
LDH SERPL L TO P-CCNC: 187 U/L (ref 110–260)
LYMPHOCYTES # BLD AUTO: 0.8 K/UL (ref 1–4.8)
LYMPHOCYTES NFR BLD: 15.8 % (ref 18–48)
MCH RBC QN AUTO: 28.2 PG (ref 27–31)
MCHC RBC AUTO-ENTMCNC: 28.7 G/DL (ref 32–36)
MCV RBC AUTO: 99 FL (ref 82–98)
MONOCYTES # BLD AUTO: 0.5 K/UL (ref 0.3–1)
MONOCYTES NFR BLD: 9.5 % (ref 4–15)
NEUTROPHILS # BLD AUTO: 3.6 K/UL (ref 1.8–7.7)
NEUTROPHILS NFR BLD: 69 % (ref 38–73)
NRBC BLD-RTO: 0 /100 WBC
PLATELET # BLD AUTO: 398 K/UL (ref 150–450)
PMV BLD AUTO: 9.1 FL (ref 9.2–12.9)
POTASSIUM SERPL-SCNC: 5 MMOL/L (ref 3.5–5.1)
PROT SERPL-MCNC: 7 G/DL (ref 6–8.4)
RBC # BLD AUTO: 2.62 M/UL (ref 4.6–6.2)
SODIUM SERPL-SCNC: 138 MMOL/L (ref 136–145)
WBC # BLD AUTO: 5.26 K/UL (ref 3.9–12.7)

## 2022-06-22 PROCEDURE — 83615 LACTATE (LD) (LDH) ENZYME: CPT | Performed by: INTERNAL MEDICINE

## 2022-06-22 PROCEDURE — 83010 ASSAY OF HAPTOGLOBIN QUANT: CPT | Performed by: INTERNAL MEDICINE

## 2022-06-22 PROCEDURE — 80053 COMPREHEN METABOLIC PANEL: CPT | Performed by: INTERNAL MEDICINE

## 2022-06-22 PROCEDURE — 85025 COMPLETE CBC W/AUTO DIFF WBC: CPT | Performed by: INTERNAL MEDICINE

## 2022-06-22 PROCEDURE — 86850 RBC ANTIBODY SCREEN: CPT | Performed by: INTERNAL MEDICINE

## 2022-06-23 ENCOUNTER — INFUSION (OUTPATIENT)
Dept: INFUSION THERAPY | Facility: HOSPITAL | Age: 82
End: 2022-06-23
Attending: NURSE PRACTITIONER
Payer: MEDICARE

## 2022-06-23 ENCOUNTER — OFFICE VISIT (OUTPATIENT)
Dept: HEMATOLOGY/ONCOLOGY | Facility: CLINIC | Age: 82
End: 2022-06-23
Payer: MEDICARE

## 2022-06-23 VITALS
OXYGEN SATURATION: 98 % | DIASTOLIC BLOOD PRESSURE: 67 MMHG | SYSTOLIC BLOOD PRESSURE: 122 MMHG | HEART RATE: 65 BPM | TEMPERATURE: 97 F | RESPIRATION RATE: 20 BRPM

## 2022-06-23 DIAGNOSIS — D63.1 ANEMIA ASSOCIATED WITH STAGE 4 CHRONIC RENAL FAILURE: Primary | ICD-10-CM

## 2022-06-23 DIAGNOSIS — N18.4 ANEMIA ASSOCIATED WITH STAGE 4 CHRONIC RENAL FAILURE: Primary | ICD-10-CM

## 2022-06-23 DIAGNOSIS — D64.9 ANEMIA, UNSPECIFIED TYPE: Primary | ICD-10-CM

## 2022-06-23 PROCEDURE — 3072F LOW RISK FOR RETINOPATHY: CPT | Mod: CPTII,95,, | Performed by: INTERNAL MEDICINE

## 2022-06-23 PROCEDURE — 99213 PR OFFICE/OUTPT VISIT, EST, LEVL III, 20-29 MIN: ICD-10-PCS | Mod: 95,,, | Performed by: INTERNAL MEDICINE

## 2022-06-23 PROCEDURE — 63600175 PHARM REV CODE 636 W HCPCS: Mod: JG,EC | Performed by: NURSE PRACTITIONER

## 2022-06-23 PROCEDURE — 96372 THER/PROPH/DIAG INJ SC/IM: CPT

## 2022-06-23 PROCEDURE — 3072F PR LOW RISK FOR RETINOPATHY: ICD-10-PCS | Mod: CPTII,95,, | Performed by: INTERNAL MEDICINE

## 2022-06-23 PROCEDURE — 99213 OFFICE O/P EST LOW 20 MIN: CPT | Mod: 95,,, | Performed by: INTERNAL MEDICINE

## 2022-06-23 RX ADMIN — EPOETIN ALFA-EPBX 40000 UNITS: 40000 INJECTION, SOLUTION INTRAVENOUS; SUBCUTANEOUS at 04:06

## 2022-06-23 NOTE — Clinical Note
He needs to remain on weekly erythropoietin and have a CBC in 2 weeks in either have a physical or virtual visit.  If no one is available, I can do a virtual 2 weeks from today, Amol

## 2022-06-23 NOTE — PROGRESS NOTES
Subjective:       Patient ID: Jake Ortiz is a 82 y.o. male.    Chief Complaint: No chief complaint on file.    HPI   Mr Ortiz had a virtual appointment with me today.  He is still on weekly erythropoietin 54160 units.  Briefly, he is a 81-year-old male with a remote history of prostate adenocarcinoma and also anemia for which he received iron infusions in April, and has been on erythropoietin.  The current erythropoietin dose is 40,000 units weekly.   Earlier this month he received 1 units of blood due to his Hg being 6.5 grams/dL.    His CBC from yesterday shows a white count of 5,200 per cubic mm, hemoglobin 7.4 grams/deciliter, hematocrit 25.8 %, MCV 99, and platelets 398,000 per cubic mm.  His haptoglobin is 297 mg/dL while his LDH is 261 units/liter.  Creatinine is 1.8 mg/dL.   His most recent colonoscopy in September 2020 had shown internal hemorrhoids and a small polyp.  His upper endoscopy on the same day had shown mild gastritis.  A video capsule swallow in late December 2021 was unremarkable.    Review of Systems    Overall he feels well.  He states that he feels great and he has no issues with his stamina.  He complains of constipation and has not been able to collect any stool samples yet.  He denies seeing any blood in the stools, black stools or blood in the urine.  Furthermore, he  denies any anxiety, depression, easy bruising, fevers, chills, night  sweats, weight loss, nausea, vomiting, diarrhea, diplopia, blurred vision, headache, chest pain, palpitations, shortness of breath, breast pain, abdominal pain, extremity pain, or difficulty ambulating.  The remainder of the ten-point ROS, including general, skin, lymph, H/N, cardiorespiratory, GI, , Neuro, Endocrine, and psychiatric is negative.     Objective:      Physical Exam    This was a virtual visit  Assessment:       Problem List Items Addressed This Visit     Remote history of adenocarcinoma of prostate. PSA was undetectable in 2/2022            Anemia. Etiology unclear; most likely multifactorial.  Patient is currently receiving weekly erythropoietin and he received one unit of PRBCs 10 days ago        Plan:         Mr Ortiz may receive his erythropoietin injection today.  He should remain on weekly erythropoietin and should return to the clinic in 2 weeks with a repeat CBC for either a physical or virtual visit.  I have again asked him to submit 3 stool samples ASAP.  He voiced understanding.  His multiple questions were answered to his satisfaction.

## 2022-06-30 NOTE — DISCHARGE INSTRUCTIONS
.[facility]  [address] [address]  [address]  [phone] phone     [phone] fax  Hours of Operation: Monday- Friday 8:00am- 5:00pm  After hours phone  [phone]  [facility]ology / On[facility] Phys[facility] on call    Dr. Portillo Swenson      Nurse Practitioners:    Natalie Ying, NEGIN Virgen, NEGIN Mckay, NEGIN Manzo, CARMELINA Melvin      Please don't hesitate to call if you have any concerns.

## 2022-06-30 NOTE — PROGRESS NOTES
Subjective:       Patient ID: aJke Ortiz is a 82 y.o. male.    Chief Complaint: No chief complaint on file.    HPI   Mr Ortiz had a virtual appointment with me today.  He is still on weekly erythropoietin 40,000 units.  Briefly, he is a 81-year-old male with a remote history of prostate adenocarcinoma and also anemia, for which he received iron infusions in April, and has been on erythropoietin.  The current erythropoietin dose is 40,000 units weekly.   Earlier this month he received 1 units of blood due to his Hg being 6.5 grams/dL.    His CBC from yesterday shows a white count of 5,000 per cubic mm, hemoglobin 6.0 grams/deciliter, hematocrit 21.4 %, MCV 97, and platelets 384,000 per cubic mm.  His most recent colonoscopy in September 2020 had shown internal hemorrhoids and a small polyp.  His upper endoscopy on the same day had shown mild gastritis.  A video capsule swallow in late December 2021 was unremarkable.    Review of Systems    Overall he feels well, however, he states that he gets tired easily.  He complains of constipation and has not been able to collect any stool samples yet.  He denies seeing any blood in the stools, black stools, or blood in the urine.  Furthermore, he  denies any anxiety, depression, easy bruising, fevers, chills, night  sweats, weight loss, nausea, vomiting, diarrhea, diplopia, blurred vision, headache, chest pain, palpitations, shortness of breath, breast pain, abdominal pain, extremity pain, or difficulty ambulating.  The remainder of the ten-point ROS, including general, skin, lymph, H/N, cardiorespiratory, GI, , Neuro, Endocrine, and psychiatric is negative.     Objective:      Physical Exam    This was a virtual visit  Assessment:       Problem List Items Addressed This Visit     Remote history of adenocarcinoma of prostate. PSA was undetectable in 2/2022           Anemia. Etiology unclear; most likely multifactorial.  Patient is currently receiving weekly  erythropoietin but he remains transfusion dependent.      Plan:         Mr Ortiz will receive 2 units of PRBCs today given his significant and symptomatic anemia.  He will continue the erythropoietin for another month, however, if he remains transfusion dependent despite erythropoietin, it should be discontinued..  I explained to him that he may need a bone marrow biopsy given the history of prostate cancer, however, I will defer that decision to Dr. Nath or whoever assumes his care on [place].    He will return in 1 week with a repeat CBC and he will need to submit 3 stool samples at the time of his next visit.  His multiple questions were answered to his satisfaction.

## 2022-07-06 NOTE — PROGRESS NOTES
Established Patient Chronic Pain Note     Referring Physician: No ref. provider found    PCP: Byron Stevens MD    Chief Complaint:   LBP     SUBJECTIVE:  Interval History (7/7/2022): Jake Ortiz presents today for follow-up visit.  he underwent Bilateral L3/4 TF XUAN on 06/08/2022.  The patient reports that he is/was unchanged following the procedure.  he reports 0% pain relief. Discussed SCS trial at previous visit, patient has read over brochures and spoken with friends/family that have an SCS and is interested in pursuing this procedure. Recently underwent pacemaker replacement on 06/16/2022.  Patient reports pain as 7/10 today. Reports continued low back pain without radiation. Reports last night he took 3 extra strength tylenol which temporarily mitigated his pain. Patient has undergone several injections including ESIs with varying levels and approaches, MBBs, RFAs, all with limited relief.      Interval history 05/10/2022  Patient presents for six-month follow-up.  He continues to report lower back pain at the level of L3-4 which radiates anteriorly.  Patient reports pain is exacerbated with prolonged standing and with ambulation.  Patient is interested in pursuing intervention.  Today patient denies significant lower extremity radiculopathy.  Again patient reiterates no significant relief following prior lumbar medial branch blocks or radiofrequency ablation.  Patient has read spinal cord stimulation literature and would like to continue with transforaminal injection at this time.    Interval history 11/17/2020  Patient presents for CT lumbar spine review.  Today patient reports pain has been relatively well controlled.  Patient reports he was dancing earlier today.  When pain is severe patient reports pain in the lower back which radiates down the right lower extremity along the lateral distribution to the calf.  Patient denies significant weakness in the lower extremities or new onset bowel or bladder  incontinence or gait ataxia.  Patient reviewed spinal cord stimulation educational material and would like to pursue pharmacologic management prior to trialing this.    HPI:  09/22/2021  Jake Ortiz is a 82 y.o. male with past medical history significant for chronic restrictive lung disease, lumbar spondylosis with radiculopathy status post L2-5 laminectomy, hypertension, hyperlipidemia, coronary artery disease status post myocardial infarctions status post triple-vessel CABG, atrial fibrillation, congestive heart failure status post percutaneous pacemaker placement, stage 4 chronic kidney disease, prostate adenocarcinoma, type 2 diabetes, GERD, obstructive sleep apnea, multi joint arthralgia who presents to the clinic for the evaluation of lower back and leg pain.  Patient reports longstanding history of lower back pain which is constant with radiation down the right lower extremity in L4-5 distribution to the calf.  Patient denies any radicular symptoms on the left.  Patient denies any significant weakness in bilateral lower extremities.  Pain is described as aching in nature and is a 7/10 today.  Pain at its worst is 10/10.  Pain is exacerbated with prolonged standing and with ambulation as well as with crossing his legs.  Patient is able to ambulate approximately 100 ft before requiring rest.  Pain is improved with lumbar flexion, stretching.  Patient has received multiple prior interventions including medial branch block, radiofrequency ablation, epidural and caudal epidural steroid injection without meaningful relief.    Of note patient last saw Dr. Trivedi December 16, 2020 with recommendation for medial branch block and consideration of radiofrequency ablation.    Patient denies night fever/night sweats, urinary incontinence, bowel incontinence, significant weight loss, significant motor weakness and loss of sensations.    Pain Disability Index Review:     Last 3 PDI Scores 7/6/2022 5/10/2022 11/17/2021    Pain Disability Index (PDI) 15 42 20       Non-Pharmacologic Treatments:  Physical Therapy/Home Exercise: yes  Ice/Heat:yes  TENS: no  Acupuncture: no  Massage: no  Chiropractic: no    Other: no      Pain Medications:  - Adjuvant Medications: Clonazepam (Klonopin) and Tylenol (Acetaminophen)  - Anti-Coagulants: Aspirin, Plavix ( Clopidogrel) and Eliquis    Pain Procedures:   Surgeries:  Lumbar surgery with Dr. Powers with complications with staph infection causing 2 subsequent surgeries  Injections:  -June 4, 2021:  Left L3-5 lumbar radiofrequency ablation  -December 21, 2020:  Bilateral L3-5 medial branch block with steroid  - series of 3 injections (what sounds like ESIs) about 30 years ago with relief  - Lumbar Medial Branch Blocks and Lumbar Radiofrequency Ablation with limited relief  - L5/S1 IL XUAN on 11/22/19 with some pain relief for a few days  - L5/S1 IL XUAN on 1/6/2020 with limited pain relief for a few days  - caudal XUAN on 2/10/20 with 15% pain relief x 2 days  -10/12/2020 bilateral L3-5 medial branch blocks, 50% relief    Past Medical History:   Diagnosis Date    Abnormal PFT     Anemia     Arthritis     Atrial fibrillation 10/19/2017    Back pain     Congestive heart failure 3/5/2018    Coronary artery disease     DM (diabetes mellitus) 2018     am 06/23/2020    DM (diabetes mellitus) 2016     am 08/17/2021    Hyperlipemia     Hypertension     Myocardial infarction     Obesity     NASREEN (obstructive sleep apnea) 6/5/2018    Prostate cancer 2015    Tobacco dependence     Type 2 diabetes mellitus      Past Surgical History:   Procedure Laterality Date    COLONOSCOPY N/A 9/22/2020    Procedure: COLONOSCOPY;  Surgeon: Javier Farmer MD;  Location: [facility];  Service: Endoscopy;  Laterality: N/A;    CORONARY ARTERY BYPASS GRAFT  1987    EPIDURAL STEROID INJECTION N/A 11/22/2019    Procedure: Lumbar L5/S1 IL XUAN;  Surgeon: Tacho Trivedi MD;  Location: [facility]  PAIN MGT;  Service: Pain Management;  Laterality: N/A;    EPIDURAL STEROID INJECTION N/A 1/6/2020    Procedure: Lumbar L5/S1 IL XUAN;  Surgeon: Tacho Trivedi MD;  [facility];  Service: Pain Management;  Laterality: N/A;    EPIDURAL STEROID INJECTION N/A 2/10/2020    Procedure: Caudal XUAN;  Surgeon: Tacho Trivedi MD;  [facility];  Service: Pain Management;  Laterality: N/A;    ESOPHAGOGASTRODUODENOSCOPY N/A 9/22/2020    Procedure: EGD (ESOPHAGOGASTRODUODENOSCOPY);  Surgeon: Javier Farmer MD;  Location[facility];  Service: Endoscopy;  Laterality: N/A;    INJECTION OF ANESTHETIC AGENT AROUND MEDIAL BRANCH NERVES INNERVATING LUMBAR FACET JOINT Bilateral 10/12/2020    Procedure: Bilateral L3-5 MBB;  Surgeon: Tacho Trivedi MD;  Location: [facility] PAIN MGT;  Service: Pain Management;  Laterality: Bilateral;    INJECTION OF ANESTHETIC AGENT AROUND MEDIAL BRANCH NERVES INNERVATING LUMBAR FACET JOINT Bilateral 12/21/2020    Procedure: Bilateral L3-5 MBB with steroid;  Surgeon: Tacho Trivedi MD;  Location: [facility] PAIN MGT;  Service: Pain Management;  Laterality: Bilateral;    INTRALUMINAL GASTROINTESTINAL TRACT IMAGING VIA CAPSULE N/A 12/29/2021    Procedure: IMAGING PROCEDURE, GI TRACT, INTRALUMINAL, VIA CAPSULE;  Surgeon: Tahir Geronimo RN;  Location: [facility] ENDO;  Service: Endoscopy;  Laterality: N/A;    PROSTATE SURGERY      prostate radiation    RADIOFREQUENCY THERMOCOAGULATION Left 6/4/2021    Procedure: Left L3-5 Lumbar RFA;  Surgeon: Tacho Trivedi MD;  Location: [facility] PAIN MGT;  Service: Pain Management;  Laterality: Left;    RADIOFREQUENCY THERMOCOAGULATION Right 6/18/2021    Procedure: Right L3-5 Lumbar RFA;  Surgeon: Tacho Trivedi MD;  Location: [facility] PAIN MGT;  Service: Pain Management;  Laterality: Right;    REPLACEMENT OF PACEMAKER GENERATOR Left 6/16/2022    Procedure: REPLACEMENT, PULSE GENERATOR, CARDIAC PACEMAKER/CRT-P device;  Surgeon: Darrel SANCHEZ  MD Alise;  Location: [facility] CATH LAB;  Service: Cardiology;  Laterality: Left;  NOT MRI safe   Pacer and leads implanted 9/30/2017, Dr. Souleymane CARROLLT Consulta CRT-P C4TR01, [id number]   A lead: Mdt 5013 CapSureFix® Novus, [id number]   RV lead: Mdt 5009 CapSureFix® Novus, [id number]   LV lead: Jovan 4196 At    SELECTIVE INJECTION OF ANESTHETIC AGENT AROUND LUMBAR SPINAL NERVE ROOT BY TRANSFORAMINAL APPROACH Bilateral 6/8/2022    Procedure: Bilateral L3/4 TF XUAN with RN IV sedation;  Surgeon: Philipp Demarco MD;  Location: [facility];  Service: Pain Management;  Laterality: Bilateral;    SPINE SURGERY      fusion    TONSILLECTOMY       Review of patient's allergies indicates:  No Known Allergies    Current Outpatient Medications   Medication Sig    acetaminophen (TYLENOL) 500 MG tablet Take 2 tablets (1,000 mg total) by mouth every 6 (six) hours as needed for Pain.    apixaban (ELIQUIS) 2.5 mg Tab Take 1 tablet (2.5 mg total) by mouth 2 (two) times daily.    aspirin 81 MG Chew Take 1 tablet (81 mg total) by mouth once daily.    atorvastatin (LIPITOR) 80 MG tablet One tablet daily (Patient taking differently: Take 80 mg by mouth every evening.)    blood sugar diagnostic Strp Check blood glucose levels daily in the AM fasting and 1-2 times more daily.    cefadroxil (DURICEF) 500 MG Cap Take 1 capsule (500 mg total) by mouth every 12 (twelve) hours.    cholecalciferol, vitamin D3, (VITAMIN D3) 25 mcg (1,000 unit) capsule Take 1,000 Units by mouth once daily.    fluticasone propionate (FLONASE) 50 mcg/actuation nasal spray USE 2 SPRAYS IN EACH NOSTRIL EVERY DAY    furosemide (LASIX) 40 MG tablet Take 1 tablet (40 mg total) by mouth 2 (two) times daily. Can double to 2 tablets twice a day for 3 days for more swelling/fluid build up - take 80mg twice a day    krill/om-3/dha/epa/phospho/ast (MEGARED OMEGA-3 KRILL OIL ORAL) Take 1 capsule by mouth every morning.    lancets Misc Check blood glucose levels  "daily in the AM fasting and 1-2 times more daily.    levocetirizine (XYZAL) 5 MG tablet TAKE 1 TABLET EVERY EVENING    losartan (COZAAR) 50 MG tablet Take 1 tablet (50 mg total) by mouth once daily.    magnesium oxide (MAG-OX) 400 mg (241.3 mg magnesium) tablet Take 1 tablet (400 mg total) by mouth once daily.    multivitamin capsule Take 1 capsule by mouth once daily.    pantoprazole (PROTONIX) 40 MG tablet Take 1 tablet (40 mg total) by mouth once daily.    potassium chloride SA (K-DUR,KLOR-CON) 20 MEQ tablet Take 1 tablet (20 mEq total) by mouth once daily.    spironolactone (ALDACTONE) 25 MG tablet Take 1 tablet (25 mg total) by mouth once daily.    vit A/vit C/vit E/zinc/copper (OCUVITE PRESERVISION ORAL) Take 1 capsule by mouth every evening.    clonazePAM (KLONOPIN) 1 MG tablet Take 1 tablet (1 mg total) by mouth nightly as needed for Anxiety.     No current facility-administered medications for this visit.     Facility-Administered Medications Ordered in Other Visits   Medication    ondansetron injection 4 mg       Review of Systems     GENERAL:  No weight loss, malaise or fevers.  HEENT:   No recent changes in vision or hearing  NECK:  Negative for lumps, no difficulty with swallowing.  RESPIRATORY:  Negative for cough, wheezing or shortness of breath, patient denies any recent URI.  CARDIOVASCULAR:  Negative for chest pain, leg swelling or palpitations.  GI:  Negative for abdominal discomfort, blood in stools or black stools or change in bowel habits.  MUSCULOSKELETAL:  See HPI.  SKIN:  Negative for lesions, rash, and itching.  PSYCH:  No mood disorder or recent psychosocial stressors.   HEMATOLOGY/LYMPHOLOGY:  Negative for prolonged bleeding, bruising easily or swollen nodes.    NEURO:   No history of headaches, syncope, paralysis, seizures or tremors.  All other reviewed and negative other than HPI.    OBJECTIVE:    /62   Pulse 70   Resp 17   Ht 5' 9" (1.753 m)   Wt 85.6 kg (188 lb " 11.4 oz)   BMI 27.87 kg/m²       Physical Exam    GENERAL: Well appearing, in no acute distress, alert and oriented x3.  PSYCH:  Mood and affect appropriate.  SKIN: Skin color, texture, turgor normal, no rashes or lesions.  HEAD/FACE:  Normocephalic, atraumatic. Cranial nerves grossly intact.    CV: RRR with palpation of the radial artery.  PULM: No evidence of respiratory difficulty, symmetric chest rise.  GI:  Soft and non-tender.    BACK: Straight leg raising in the sitting and supine positions is negative to radicular pain. No pain to palpation over the facet joints of the lumbar spine or spinous processes. Normal range of motion without pain reproduction.  EXTREMITIES: Peripheral joint ROM is full and pain free without obvious instability or laxity in all four extremities. No deformities, edema, or skin discoloration. Good capillary refill.  MUSCULOSKELETAL: Able to stand on heels & toes.   Shoulder, hip, and knee provocative maneuvers are negative.  There is no pain with palpation over the sacroiliac joints bilaterally.  FABERs test is negative.  FAER test is negative bilaterally.   Bilateral upper and lower extremity strength is normal and symmetric.  No atrophy or tone abnormalities are noted.  RIGHT Lower extremity: Hip flexion 5/5, Hip Abduction 5/5, Hip Adduction 5/5, Knee extension 5/5, Knee flexion 5/5, Ankle dorsiflexion5/5, Extensor hallucis longus 5/5, Ankle plantarflexion 5/5  LEFT Lower extremity:  Hip flexion 5/5, Hip Abduction 5/5,Hip Adduction 5/5, Knee extension 5/5, Knee flexion 5/5, Ankle dorsiflexion 5/5, Extensor hallucis longus 5/5, Ankle plantarflexion 5/5  -Normaltesting knee (patellar) jerk and ankle (achilles) jerk    NEURO: Bilateral upper and lower extremity coordination and muscle stretch reflexes are physiologic and symmetric. No loss of sensation is noted.  GAIT: normal.    Imaging:   CT lumbar spine 09/22/2021  FINDINGS:  Right total nephrectomy again noted.  Advanced  atherosclerotic calcification.     Levoconvex lower lumbar scoliotic curvature secondary to asymmetric disc height loss on the right at L3-4 and L4-5.     T12-L1: Severe disc height loss with endplate sclerosis, vacuum disc phenomenon, and anterior osteophyte formation.  No spinal canal stenosis.  No significant right neural foraminal stenosis.  Moderate left neural foraminal stenosis.     L1-2: No significant disc height loss.  Hypertrophic facet arthropathy.     L2-3: Chronic ankylosis of L2-3.  Moderate right and mild left neural foraminal stenosis.  No spinal canal stenosis.  Ankylosis of posterior elements also noted.     L3-4: Severe disc height loss with endplate sclerosis and osteophyte formation.  Severe right and moderate to severe left neural foraminal stenosis.  Severe hypertrophic facet arthropathy.     L4-5: Laminectomy changes noted. Leftward chronic subluxation of L4 relative to L3 and L5 with right worse than left disc height loss with endplate sclerosis and osteophyte formation.  Right-sided L4 vertebral height loss.  Severe right and mild left neural foraminal stenosis.  Severe hypertrophic facet arthropathy.     L5-S1: No significant disc height loss.  Mild left neural foraminal stenosis.  Severe hypertrophic facet arthropathy.     Impression:   Severe chronic multilevel discogenic degenerative change and facet arthropathy of the lumbar spine as described above.        03/03/11    MRI Lumbar Spine Without Contrast    RESULTS: THIS STUDY DEMONSTRATES SEVERE DEGENERATIVE END PLATE CHANGES AT  L4-5 AND L2-3.  THERE IS A LARGE ANTERIOR OSTEOPHYTE PROJECTING OFF THE  END PLATES OF L3, L2, AND L4.  THERE ARE ALSO PROMINENT DISC BULGES AT  L4-5, L3-4, AND L2-3.  THIS STUDY DEMONSTRATES MULTILEVEL FACET  ARTHROPATHY.    IMPRESSION: PROMINENT DEGENERATIVE DISC DISEASE AS DESCRIBED IN THE ABOVE  PARAGRAPH. THIS STUDY ALSO DEMONSTRATES PROMINENT RIGHT NEURAL FORAMINAL  NARROWING AT L4-5 AND LEFT NEURAL  FORAMINAL NARROWING AT L4-5.    06/08/11  MRI Lumbar Spine W WO Cont    RESULTS:  Indication:     Back pain patient's had extensive operative intervention  with laminectomy starting at L3 and extending to mid L4. There also  appears to be a right laminectomy at L4-L5. Multiple pedicle screws  apparently have been removed.    L1-L2 level unremarkable.    L2-L3 disc is narrowed there is mild spondylosis with minor facet  arthropathy present. There is some posterior paravertebral edema present  right greater than left extending from right facet lesion of L2-L3. There  is mild central stenosis present but the L2 nerve roots exit without  contact or distortion. There screw artifacts present in the pedicles at  L2 and L3-L4 and L5.    L3-L4 disc is narrowed there is mild spondylosis patient's had  laminectomy there is extensive para spinous muscular edema with fluid  along the left lamina and paraspinous region is also considerable loss of  fat plane within the paravertebral musculature of the spine from L1-L2  down to sacral region.    L4-L5 level is obliterated right laminectomy is present is extensive  edema and loss of fat planes within the paravertebral musculature and  around the posterior and lateral thecal sac.    The L5-S1 disc is intact there is facet arthropathy present.    Postcontrast study demonstrates diffuse enhancement of the  paravertebral  soft tissues starting at approximately the T12 and extending to the  sacral level.    There is diffuse enhancement of the L2-L3 disc with enhancing tissues of  the posterior paraspinous region and minor facet joint enhancement. There  is also enhancement of the posterior epidural region resulting in some  distortion of the dorsal lateral thecal sac, left greater than right.    The L3-L4 level demonstrates minimal central and left-sided enhancement  of the disc with extensive  enhancement of the paraspinous elements with  enhancement of the posterior and lateral  epidural canal. No intradural  enhancement is noted. There is some enhancement around the right residual  facet . A portion of the left facet complex is absent..    The L4-L5 level again demonstrates comparable diffuse paraspinous  enhancement and enhancement surrounding the thecal sac within the central  canal. There is enhancement of the bony elements including the pedicles  and facet complex.    L5-S1 demonstrate some  facet enhancement and posterior perithecal  enhancement        IMPRESSION:  Patient's had extensive operative intervention with removal of  pedicle screws at L2-L3 L4 and L5. There is extensive enhancement of the  operative bed in the posterior epidural region as well is some  enhancement of the scar tissue around the thecal sac. There Is no  intradural or intramedullary enhancement although there is clumping of  the cauda and enhancement of the L2-L3 and L4-L5 disc. There is also  small amount enhancement of the posterior aspect of the L3-L4 disc.  enhancement consistent with gliosis. The various levels of bone  enhancement including the facet complex at L3 and L4 as well as portions  of the pedicles involving L5 involving and adjacent to the screw tracks..      09/25/19    X-Ray Lumbar Spine Complete 5 View    FINDINGS:  Scoliosis remains.  Vertebral body heights and alignment are stable.  Multilevel advanced degenerative disc height loss and osteophyte formation noted.  Multilevel facet arthropathy present more prevalent at the lower lumbar spine.  No acute osseous abnormality appreciated.  Aorta iliac atherosclerotic vascular calcifications noted.    Impression  Similar appearance of the spine.  Correlate with MRI exam as clinically indicated.    08/26/14    X-Ray Cervical Spine AP And Lateral    Narrative  Findings: Vertebral alignment is normal.  There is narrowing of the disk spaces and  anterior osteophyte formation C 3-7.  There are no compression fractures or acute abnormalities are seen.   Carotid calcifications are noted bilaterally.  IMPRESSION:  Advanced degenerative change in the cervical spine.        ASSESSMENT: 82 y.o. year old male with lower back and right leg pain, consistent with     No diagnosis found.      PLAN:   - Interventions:   S/p bilateral L3/4 transforaminal epidural steroid injection without relief  -patient is a candidate for spinal cord stimulation secondary to failed improvement in pain with numerous prior etiologies including pharmacologic management, physical therapy and prior medial branch block, epidural and caudal steroid injections.  Patient would like to move forward with SCS.    Information sent to Dodie with [facility]    - Anticoagulation use: yes Plavix and Eliquis       report:  Reviewed and consistent with medication use as prescribed.      - Medications:  Patient did not tolerate Lyrica titration.  We will pursue interventional treatment.    - Therapy:   We discussed continuing physical therapy to help manage the patient/s painful condition. The patient was counseled that muscle strengthening will improve the long term prognosis in regards to pain and may also help increase range of motion and mobility.     -Consults/referral: Refer to psych for evaluation prior to SCS    - Imaging: Reviewed available imaging with patient and answered any questions they had regarding study CT of thoracic spine ordered    - Follow up visit: return to clinic in 4-6 weeks to follow up with Dr. Demarco to review imaging      The above plan and management options were discussed at length with patient. Patient is in agreement with the above and verbalized understanding.    - I discussed the goals of interventional chronic pain management with the patient on today's visit. We discussed a multimodal and systematic approach to pain.  This includes diagnostic and therapeutic injections, adjuvant pharmacologic treatment, physical therapy, and at times psychiatry.  I emphasized the importance of  regular exercise, core strengthening and stretching, diet and weight loss as a cornerstone of long-term pain management.    - This condition does not require this patient to take time off of work, and the primary goal of our Pain Management services is to improve the patient's functional capacity.  - Patient Questions: Answered all of the patient's questions regarding diagnoses, therapy, treatment and next steps        Selene Delcid PA-C  Interventional Pain Management  [facility]    Disclaimer:  This note was prepared using voice recognition system and is likely to have sound alike errors that may have been overlooked even after proof reading.  Please call me with any questions

## 2022-07-07 NOTE — PROGRESS NOTES
Subjective:       Patient ID: Jake Ortiz is a 82 y.o. male.    Chief Complaint: Anemia    HPI: Mr. 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 He has been receiving epo 40kU weekly as needed for hgb<10g/dL. He has need blood transfusions in the past. Colonoscopy in 2020 showed internal hemorrhoids and small polyps- recommended repeat in 5 years. EGD in 2020 showed some gastritis. He then had VCE in 2021 which was normal.     Today: patient states he has probably taken oral iron before but he doesn't remember. He is only fatigued when his blood levels are low. He is feeling great right now. He just received a new pacemaker and says he feels much better. He has follow up with cardiology next week. He denies any gross hematuria, melena, bleeding elsewhere, chest pain, fevers, night sweats, weight loss, sob, n/v/d/c. He understands his occult stool samples all came back positive. He is scheduled to see GI soon for this.     Social History     Socioeconomic History    Marital status:     Number of children: 0   Occupational History    Occupation: retired     Employer: [employer]   Tobacco Use    Smoking status: Former Smoker     Packs/day: 1.50     Years: 20.00     Pack years: 30.00     Types: Cigarettes     Start date:      Quit date:      Years since quittin.5    Smokeless tobacco: Never Used   Substance and Sexual Activity    Alcohol use: No    Drug use: No    Sexual activity: Not Currently     Social Determinants of Health     Financial Resource Strain: Low Risk     Difficulty of Paying Living Expenses: Not hard at all   Food Insecurity: No Food Insecurity    Worried About Running Out of Food in the Last Year: Never true    Ran Out of Food in the Last Year: Never true   Transportation Needs: No Transportation Needs    Lack of Transportation (Medical): No    Lack of Transportation (Non-Medical): No   Physical Activity: Inactive     Days of Exercise per Week: 0 days    Minutes of Exercise per Session: 0 min   Stress: No Stress Concern Present    Feeling of Stress : Not at all   Social Connections: Moderately Integrated    Frequency of Communication with Friends and Family: More than three times a week    Frequency of Social Gatherings with Friends and Family: More than three times a week    Attends Yarsanism Services: Never    Active Member of Clubs or Organizations: Yes    Attends Club or Organization Meetings: More than 4 times per year    Marital Status:    Housing Stability: High Risk    Unable to Pay for Housing in the Last Year: No    Number of Places Lived in the Last Year: 1    Unstable Housing in the Last Year: Yes       Past Medical History:   Diagnosis Date    Abnormal PFT     Anemia     Arthritis     Atrial fibrillation 10/19/2017    Back pain     Congestive heart failure 3/5/2018    Coronary artery disease     DM (diabetes mellitus) 2018     am 06/23/2020    DM (diabetes mellitus) 2016     am 08/17/2021    Hyperlipemia     Hypertension     Myocardial infarction     Obesity     NASREEN (obstructive sleep apnea) 6/5/2018    Prostate cancer 2015    Tobacco dependence     Type 2 diabetes mellitus        Family History   Problem Relation Age of Onset    Heart attack Mother     Diabetes Mother     Heart disease Mother     Cataracts Mother     Stroke Father     Heart disease Father     Heart disease Brother        Past Surgical History:   Procedure Laterality Date    COLONOSCOPY N/A 9/22/2020    Procedure: COLONOSCOPY;  Surgeon: Javier Farmer MD;  Location: [facility] ENDO;  Service: Endoscopy;  Laterality: N/A;    CORONARY ARTERY BYPASS GRAFT  1987    EPIDURAL STEROID INJECTION N/A 11/22/2019    Procedure: Lumbar L5/S1 IL XUAN;  Surgeon: Tacho Trivedi MD;  Location[place] [facility] [place] [place];  Service: Pain Management;  Laterality: N/A;    EPIDURAL STEROID INJECTION N/A 1/6/2020     Procedure: Lumbar L5/S1 IL XUAN;  Surgeon: Tacho Trivedi MD;  [facility]: [facility];  Service: Pain Management;  Laterality: N/A;    EPIDURAL STEROID INJECTION N/A 2/10/2020    Procedure: Caudal XUAN;  Surgeon: Tacho Trivedi MD;  [facility];  Service: Pain Management;  Laterality: N/A;    ESOPHAGOGASTRODUODENOSCOPY N/A 9/22/2020    Procedure: EGD (ESOPHAGOGASTRODUODENOSCOPY);  Surgeon: Javier Farmer MD;  Location: [facility] ENDO;  Service: Endoscopy;  Laterality: N/A;    INJECTION OF ANESTHETIC AGENT AROUND MEDIAL BRANCH NERVES INNERVATING LUMBAR FACET JOINT Bilateral 10/12/2020    Procedure: Bilateral L3-5 MBB;  Surgeon: Tacho Trivedi MD;  Location: [facility] PAIN MGT;  Service: Pain Management;  Laterality: Bilateral;    INJECTION OF ANESTHETIC AGENT AROUND MEDIAL BRANCH NERVES INNERVATING LUMBAR FACET JOINT Bilateral 12/21/2020    Procedure: Bilateral L3-5 MBB with steroid;  Surgeon: Tacho Trivedi MD;  Location: [facility] PAIN MGT;  Service: Pain Management;  Laterality: Bilateral;    INTRALUMINAL GASTROINTESTINAL TRACT IMAGING VIA CAPSULE N/A 12/29/2021    Procedure: IMAGING PROCEDURE, GI TRACT, INTRALUMINAL, VIA CAPSULE;  Surgeon: Tahir Geronimo RN;  Location: [facility] ENDO;  Service: Endoscopy;  Laterality: N/A;    PROSTATE SURGERY      prostate radiation    RADIOFREQUENCY THERMOCOAGULATION Left 6/4/2021    Procedure: Left L3-5 Lumbar RFA;  Surgeon: Tacho Trivedi MD;  Location: [facility] PAIN MGT;  Service: Pain Management;  Laterality: Left;    RADIOFREQUENCY THERMOCOAGULATION Right 6/18/2021    Procedure: Right L3-5 Lumbar RFA;  Surgeon: Tacho Trivedi MD;  Location: [facility] PAIN [facility]GT;  Service: Pain Management;  Laterality: Right;    REPLACEMENT OF PACEMAKER GENERATOR Left 6/16/2022    Procedure: REPLACEMENT, PULSE GENERATOR, CARDIAC PACEMAKER/CRT-P device;  Surgeon: Darrel Carrero MD;  Location: [facility] CATH LAB;  Service: Cardiology;  Laterality: Left;  NOT MRI safe   Pacer  and leads implanted 9/30/2017, Dr. Souleymane CARROLLT Consulta CRT-P C4TR01, [id number]   A lead: Mdt 5076 CapSureFix® Novus, [id number]   RV lead: Mdt 5076 CapSureFix® Novus, [id number]   LV lead: Jovan 4196 At    SELECTIVE INJECTION OF ANESTHETIC AGENT AROUND LUMBAR SPINAL NERVE ROOT BY TRANSFORAMINAL APPROACH Bilateral 6/8/2022    Procedure: Bilateral L3/4 TF XUAN with RN IV sedation;  Surgeon: Philipp Demarco MD;  Location: [facility];  Service: Pain Management;  Laterality: Bilateral;    SPINE SURGERY      fusion    TONSILLECTOMY         Review of Systems   Constitutional: Negative for activity change, appetite change, chills, diaphoresis, fatigue, fever and unexpected weight change.   HENT: Negative for congestion, nosebleeds and rhinorrhea.    Respiratory: Negative for cough and shortness of breath.    Cardiovascular: Negative for chest pain and leg swelling.   Gastrointestinal: Negative for abdominal pain, blood in stool, constipation, diarrhea, nausea and vomiting.   Genitourinary: Negative for hematuria.   Musculoskeletal: Positive for arthralgias and back pain. Negative for gait problem and myalgias.   Skin: Negative for color change, pallor and rash.   Neurological: Negative for dizziness, weakness, light-headedness, numbness and headaches.         Medication List with Changes/Refills   Current Medications    ACETAMINOPHEN (TYLENOL) 500 MG TABLET    Take 2 tablets (1,000 mg total) by mouth every 6 (six) hours as needed for Pain.    APIXABAN (ELIQUIS) 2.5 MG TAB    Take 1 tablet (2.5 mg total) by mouth 2 (two) times daily.    ASPIRIN 81 MG CHEW    Take 1 tablet (81 mg total) by mouth once daily.    ATORVASTATIN (LIPITOR) 80 MG TABLET    One tablet daily    BLOOD SUGAR DIAGNOSTIC STRP    Check blood glucose levels daily in the AM fasting and 1-2 times more daily.    CEFADROXIL (DURICEF) 500 MG CAP    Take 1 capsule (500 mg total) by mouth every 12 (twelve) hours.    CHOLECALCIFEROL, VITAMIN D3, (VITAMIN D3)  25 MCG (1,000 UNIT) CAPSULE    Take 1,000 Units by mouth once daily.    CLONAZEPAM (KLONOPIN) 1 MG TABLET    Take 1 tablet (1 mg total) by mouth nightly as needed for Anxiety.    FLUTICASONE PROPIONATE (FLONASE) 50 MCG/ACTUATION NASAL SPRAY    USE 2 SPRAYS IN EACH NOSTRIL EVERY DAY    FUROSEMIDE (LASIX) 40 MG TABLET    Take 1 tablet (40 mg total) by mouth 2 (two) times daily. Can double to 2 tablets twice a day for 3 days for more swelling/fluid build up - take 80mg twice a day    KRILL/OM-3/DHA/EPA/PHOSPHO/AST (MEGARED OMEGA-3 KRILL OIL ORAL)    Take 1 capsule by mouth every morning.    LANCETS MISC    Check blood glucose levels daily in the AM fasting and 1-2 times more daily.    LEVOCETIRIZINE (XYZAL) 5 MG TABLET    TAKE 1 TABLET EVERY EVENING    LOSARTAN (COZAAR) 50 MG TABLET    Take 1 tablet (50 mg total) by mouth once daily.    MAGNESIUM OXIDE (MAG-OX) 400 MG (241.3 MG MAGNESIUM) TABLET    Take 1 tablet (400 mg total) by mouth once daily.    MULTIVITAMIN CAPSULE    Take 1 capsule by mouth once daily.    PANTOPRAZOLE (PROTONIX) 40 MG TABLET    Take 1 tablet (40 mg total) by mouth once daily.    POTASSIUM CHLORIDE SA (K-DUR,KLOR-CON) 20 MEQ TABLET    Take 1 tablet (20 mEq total) by mouth once daily.    SPIRONOLACTONE (ALDACTONE) 25 MG TABLET    Take 1 tablet (25 mg total) by mouth once daily.    VIT A/VIT C/VIT E/ZINC/COPPER (OCUVITE PRESERVISION ORAL)    Take 1 capsule by mouth every evening.     Objective:     Vitals:    07/07/22 1313   BP: 109/63   Pulse: 71   Temp: 97 °F (36.1 °C)       Physical Exam       Labs/Results:  Lab Results   Component Value Date    WBC 5.01 07/07/2022    RBC 3.04 (L) 07/07/2022    HGB 8.2 (L) 07/07/2022    HCT 27.6 (L) 07/07/2022    MCV 91 07/07/2022    MCH 27.0 07/07/2022    MCHC 29.7 (L) 07/07/2022    RDW 16.7 (H) 07/07/2022     07/07/2022    MPV 8.4 (L) 07/07/2022    GRAN 3.6 07/07/2022    GRAN 71.4 07/07/2022    LYMPH 0.6 (L) 07/07/2022    LYMPH 12.6 (L) 07/07/2022     MONO 0.6 07/07/2022    MONO 11.6 07/07/2022    EOS 0.2 07/07/2022    BASO 0.04 07/07/2022    EOSINOPHIL 3.0 07/07/2022    BASOPHIL 0.8 07/07/2022     CMP  Sodium   Date Value Ref Range Status   07/07/2022 138 136 - 145 mmol/L Final     Potassium   Date Value Ref Range Status   07/07/2022 4.7 3.5 - 5.1 mmol/L Final     Chloride   Date Value Ref Range Status   07/07/2022 109 95 - 110 mmol/L Final     CO2   Date Value Ref Range Status   07/07/2022 20 (L) 23 - 29 mmol/L Final     Glucose   Date Value Ref Range Status   07/07/2022 122 (H) 70 - 110 mg/dL Final     BUN   Date Value Ref Range Status   07/07/2022 27 (H) 8 - 23 mg/dL Final     Creatinine   Date Value Ref Range Status   07/07/2022 1.6 (H) 0.5 - 1.4 mg/dL Final     Calcium   Date Value Ref Range Status   07/07/2022 8.8 8.7 - 10.5 mg/dL Final     Total Protein   Date Value Ref Range Status   07/07/2022 7.0 6.0 - 8.4 g/dL Final     Albumin   Date Value Ref Range Status   07/07/2022 3.7 3.5 - 5.2 g/dL Final     Total Bilirubin   Date Value Ref Range Status   07/07/2022 0.3 0.1 - 1.0 mg/dL Final     Comment:     For infants and newborns, interpretation of results should be based  on gestational age, weight and in agreement with clinical  observations.    Premature Infant recommended reference ranges:  Up to 24 hours.............<8.0 mg/dL  Up to 48 hours............<12.0 mg/dL  3-5 days..................<15.0 mg/dL  6-29 days.................<15.0 mg/dL       Alkaline Phosphatase   Date Value Ref Range Status   07/07/2022 74 55 - 135 U/L Final     AST   Date Value Ref Range Status   07/07/2022 11 10 - 40 U/L Final     ALT   Date Value Ref Range Status   07/07/2022 <5 (L) 10 - 44 U/L Final     Anion Gap   Date Value Ref Range Status   07/07/2022 9 8 - 16 mmol/L Final     eGFR if    Date Value Ref Range Status   07/07/2022 46 (A) >60 mL/min/1.73 m^2 Final     eGFR if non    Date Value Ref Range Status   07/07/2022 40 (A) >60  mL/min/1.73 m^2 Final     Comment:     Calculation used to obtain the estimated glomerular filtration  rate (eGFR) is the CKD-EPI equation.           Latest Reference Range & Units 02/14/22 13:47   PSA Diagnostic 0.00 - 4.00 ng/mL <0.01      VCE 12/29/21  Impression:- Stool in the colon.   - Normal video capsule endoscopy.    Colonoscopy 9/22/2020  Impression: - One 3 mm polyp in the sigmoid colon, removed with a cold snare. Resected and retrieved.   - Non-bleeding internal hemorrhoids.   - The examination was otherwise normal on direct and retroflexion views.   -recommended 5 year follow up     egd 9/22/2020  Impression:- Z-line regular, 41 cm from the incisors.   - Normal esophagus.    - Gastritis. Biopsied.    - Normal duodenal bulb, second portion of the duodenum and third portion of the duodenum.     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  --last blood transfusion 6/30/22  --3 stool occult samples POSITIVE- will refer to GI for workup. Seeing them 7/11/22.    Adenocarcinoma of prostate  --PSA <0.01    Iron deficiency anemia due to chronic blood loss  --Colonoscopy in 9/2020 showed internal hemorhhoids and small polyps- recommedned repeat in 5 years.  -- EGD in 9/2020 showed some gastritis.   --VCE in 12/2021 which was normal.       Follow-Up: epo weekly, 3 weeks (cbc cmp iron/itbc ferritin), 6 weeks RTC (cbc cmp)     Mary Jane Allen PA-C  Hematology Oncology

## 2022-07-11 PROBLEM — I50.42 CHRONIC COMBINED SYSTOLIC AND DIASTOLIC CONGESTIVE HEART FAILURE: Status: ACTIVE | Noted: 2018-03-05

## 2022-07-11 NOTE — PROGRESS NOTES
Subjective:   Patient ID:  Jake Ortiz is a 82 y.o. male who presents for evaluation of Congestive Heart Failure      Congestive Heart Failure  Pertinent negatives include no abdominal pain, chest pain, claudication, near-syncope, palpitations or shortness of breath.       Jake Ortiz is a 81 year old male who presents to CHF clinic for hospital follow up.     His current medical conditions include CAD s/p CABG, old MI, HFpEF with TR/MR, AVB s/p CRT, PAF on Eliquis, Anemia, HTN, HLP, DM Type II, CKD Stage IV.     He was recently admitted due to shortness of breath with LE edema and weight gain over the previous few days.     ED workup revealed , H/H 8/29. He reported recent dietary indiscretion with boiled crawfish. Repeat ECHO revealed EF 35%, Grade III/IV DD, Mild RVE with reduced RV function. He returns today and states he is doing ok.     Has lost from 219>>192 pounds since hospital discharge. He is doing a much better job since hospital discharge with diet and fluid restrictions. Previously, he was not following any restrictions.       Denies chest pain or anginal equivalents. No shortness of breath, KING or palpitations. Denies orthopnea, PND or abdominal bloating. Reports regular walking without any issues lately. NO leg swelling or claudications. No recent falls, syncope or near syncopal events. Reports compliance with medications and dietary restrictions. NO CNS complaints to suggest TIA or CVA today. No signs of abnormal bleeding on Eliquis, ASA.       He is still very limited with physical activity due to shortness of breath after a few feet. So he is very sedentary due to this. Today, He is NYHA FC III.     6/9/2022 update    Jake Ortiz returns to clinic for follow up and to discuss CRT P gen change with Dr Carrero.     He stopped Farxiga due to issues with lightheadedness and dizziness. Aldactone held for 5 days due to bump in renal function and resumed aldactone today. Denies any CHF  complaints today in office.     Denies chest pain or anginal equivalents. NO shortness of breath, KING or palpitations. Weight has been stable at home around 181-183 pounds since last visit. Denies orthopnea, PND or abdominal bloating. Doing well with limiting salt intake recently.       7/11/2022 update    Jake Ortiz returns for CHF follow up and is doing well today.   He has noted improvement in functional capacity since CRT P gen change.     Has been able to walk more without any shortness of breath. When he does develop shortness of breath, he recovers much faster than previously.     Denies chest pain or anginal equivalents. No shortness of breath, KING or palpitations. Denies orthopnea, PND or abdominal bloating. Reports regular walking without any issues lately. NO leg swelling or claudications. No recent falls, syncope or near syncopal events. Reports compliance with medications and dietary restrictions. NO CNS complaints to suggest TIA or CVA today. No signs of abnormal bleeding on Eliquis.       Past Medical History:   Diagnosis Date    Abnormal PFT     Anemia     Arthritis     Atrial fibrillation 10/19/2017    Back pain     Congestive heart failure 3/5/2018    Coronary artery disease     DM (diabetes mellitus) 2018     am 06/23/2020    DM (diabetes mellitus) 2016     am 08/17/2021    Hyperlipemia     Hypertension     Myocardial infarction     Obesity     NASREEN (obstructive sleep apnea) 6/5/2018    Prostate cancer 2015    Tobacco dependence     Type 2 diabetes mellitus        Past Surgical History:   Procedure Laterality Date    COLONOSCOPY N/A 9/22/2020    Procedure: COLONOSCOPY;  Surgeon: Javier Farmer MD;  Location: [facility] ENDO;  Service: Endoscopy;  Laterality: N/A;    CORONARY ARTERY BYPASS GRAFT  1987    EPIDURAL STEROID INJECTION N/A 11/22/2019    Procedure: Lumbar L5/S1 IL XUAN;  Surgeon: Tacho Trivedi MD;  Location: [facility] PAIN MGT;  Service: Pain  Management;  Laterality: N/A;    EPIDURAL STEROID INJECTION N/A 1/6/2020    Procedure: Lumbar L5/S1 IL XUAN;  Surgeon: Tacho Trivedi MD;  Location: [facility] PAIN MGT;  Service: Pain Management;  Laterality: N/A;    EPIDURAL STEROID INJECTION N/A 2/10/2020    Procedure: Caudal XUAN;  Surgeon: Tacho Trivedi MD;  [facility];  Service: Pain Management;  Laterality: N/A;    ESOPHAGOGASTRODUODENOSCOPY N/A 9/22/2020    Procedure: EGD (ESOPHAGOGASTRODUODENOSCOPY);  Surgeon: Javier Farmer MD;  Location: [facility] ENDO;  Service: Endoscopy;  Laterality: N/A;    INJECTION OF ANESTHETIC AGENT AROUND MEDIAL BRANCH NERVES INNERVATING LUMBAR FACET JOINT Bilateral 10/12/2020    Procedure: Bilateral L3-5 MBB;  Surgeon: Tacho Trivedi MD;  Location: [facility] PAIN MGT;  Service: Pain Management;  Laterality: Bilateral;    INJECTION OF ANESTHETIC AGENT AROUND MEDIAL BRANCH NERVES INNERVATING LUMBAR FACET JOINT Bilateral 12/21/2020    Procedure: Bilateral L3-5 MBB with steroid;  Surgeon: Tacho Trivedi MD;  Location: [facility] PAIN MGT;  Service: Pain Management;  Laterality: Bilateral;    INTRALUMINAL GASTROINTESTINAL TRACT IMAGING VIA CAPSULE N/A 12/29/2021    Procedure: IMAGING PROCEDURE, GI TRACT, INTRALUMINAL, VIA CAPSULE;  Surgeon: Tahir Geronimo RN;  Location: [facility] ENDO;  Service: Endoscopy;  Laterality: N/A;    PROSTATE SURGERY      prostate radiation    RADIOFREQUENCY THERMOCOAGULATION Left 6/4/2021    Procedure: Left L3-5 Lumbar RFA;  Surgeon: Tacho Trivedi MD;  Location: [facility] PAIN MGT;  Service: Pain Management;  Laterality: Left;    RADIOFREQUENCY THERMOCOAGULATION Right 6/18/2021    Procedure: Right L3-5 Lumbar RFA;  Surgeon: Tacho Trivedi MD;  Location: [facility] PAIN MGT;  Service: Pain Management;  Laterality: Right;    REPLACEMENT OF PACEMAKER GENERATOR Left 6/16/2022    Procedure: REPLACEMENT, PULSE GENERATOR, CARDIAC PACEMAKER/CRT-P device;  Surgeon: Darrel Carrero MD;  Location:  [facility] CATH LAB;  Service: Cardiology;  Laterality: Left;  NOT MRI safe   Pacer and leads implanted 2017, Dr. Souleymane CARROLLT Consulta CRT-P C4TR01, [id number]   A lead: Mdt 5076 CapSureFix® Novus, [id number]   RV lead: Mdt 5076 CapSureFix® Novus, [id number]   LV lead: Jovan 4196 At    SELECTIVE INJECTION OF ANESTHETIC AGENT AROUND LUMBAR SPINAL NERVE ROOT BY TRANSFORAMINAL APPROACH Bilateral 2022    Procedure: Bilateral L3/4 TF XUAN with RN IV sedation;  Surgeon: Philipp Demarco MD;  Location: [facility];  Service: Pain Management;  Laterality: Bilateral;    SPINE SURGERY      fusion    TONSILLECTOMY         Social History     Tobacco Use    Smoking status: Former Smoker     Packs/day: 1.50     Years: 20.00     Pack years: 30.00     Types: Cigarettes     Start date:      Quit date:      Years since quittin.5    Smokeless tobacco: Never Used   Substance Use Topics    Alcohol use: No    Drug use: No       Family History   Problem Relation Age of Onset    Heart attack Mother     Diabetes Mother     Heart disease Mother     Cataracts Mother     Stroke Father     Heart disease Father     Heart disease Brother      Wt Readings from Last 3 Encounters:   22 83.9 kg (184 lb 15.5 oz)   22 84.5 kg (186 lb 6.4 oz)   22 86 kg (189 lb 9.5 oz)     Temp Readings from Last 3 Encounters:   22 97.4 °F (36.3 °C)   22 97 °F (36.1 °C)   22 97.1 °F (36.2 °C) (Temporal)     BP Readings from Last 3 Encounters:   22 (!) 110/58   22 (!) 120/52   22 114/60     Pulse Readings from Last 3 Encounters:   22 83   22 79   22 80     Current Outpatient Medications on File Prior to Visit   Medication Sig Dispense Refill    apixaban (ELIQUIS) 2.5 mg Tab Take 1 tablet (2.5 mg total) by mouth 2 (two) times daily. 180 tablet 1    aspirin 81 MG Chew Take 1 tablet (81 mg total) by mouth once daily. 30 tablet 0    atorvastatin (LIPITOR) 80 MG tablet One  tablet daily (Patient taking differently: Take 80 mg by mouth every evening.) 90 tablet 1    cefadroxil (DURICEF) 500 MG Cap Take 1 capsule (500 mg total) by mouth every 12 (twelve) hours. 6 capsule 0    cholecalciferol, vitamin D3, (VITAMIN D3) 25 mcg (1,000 unit) capsule Take 1,000 Units by mouth once daily.      clonazePAM (KLONOPIN) 1 MG tablet Take 1 tablet (1 mg total) by mouth nightly as needed for Anxiety. 90 tablet 0    furosemide (LASIX) 40 MG tablet Take 1 tablet (40 mg total) by mouth 2 (two) times daily. Can double to 2 tablets twice a day for 3 days for more swelling/fluid build up - take 80mg twice a day 90 tablet 1    levocetirizine (XYZAL) 5 MG tablet TAKE 1 TABLET EVERY EVENING 90 tablet 1    losartan (COZAAR) 50 MG tablet Take 1 tablet (50 mg total) by mouth once daily. 90 tablet 3    magnesium oxide (MAG-OX) 400 mg (241.3 mg magnesium) tablet Take 1 tablet (400 mg total) by mouth once daily. 90 tablet 1    multivitamin capsule Take 1 capsule by mouth once daily.      pantoprazole (PROTONIX) 40 MG tablet Take 1 tablet (40 mg total) by mouth once daily. 90 tablet 3    potassium chloride SA (K-DUR,KLOR-CON) 20 MEQ tablet Take 1 tablet (20 mEq total) by mouth once daily. 90 tablet 1    spironolactone (ALDACTONE) 25 MG tablet Take 1 tablet (25 mg total) by mouth once daily. 90 tablet 1    vit A/vit C/vit E/zinc/copper (OCUVITE PRESERVISION ORAL) Take 1 capsule by mouth every evening.      acetaminophen (TYLENOL) 500 MG tablet Take 2 tablets (1,000 mg total) by mouth every 6 (six) hours as needed for Pain.  0    blood sugar diagnostic Strp Check blood glucose levels daily in the AM fasting and 1-2 times more daily. 300 strip 3    fluticasone propionate (FLONASE) 50 mcg/actuation nasal spray USE 2 SPRAYS IN EACH NOSTRIL EVERY DAY 48 g 5    krill/om-3/dha/epa/phospho/ast (MEGARED OMEGA-3 KRILL OIL ORAL) Take 1 capsule by mouth every morning.      lancets Misc Check blood glucose levels  "daily in the AM fasting and 1-2 times more daily. 300 each 3    [DISCONTINUED] clonazePAM (KLONOPIN) 1 MG tablet Take 1 tablet (1 mg total) by mouth nightly as needed for Anxiety. 90 tablet 0     Current Facility-Administered Medications on File Prior to Visit   Medication Dose Route Frequency Provider Last Rate Last Admin    ondansetron injection 4 mg  4 mg Intravenous Once PRN Tacho Trivedi MD           Review of Systems   Constitutional: Positive for malaise/fatigue.   HENT: Negative for hearing loss and hoarse voice.    Eyes: Negative for blurred vision and visual disturbance.   Cardiovascular: Positive for dyspnea on exertion and irregular heartbeat. Negative for chest pain, claudication, leg swelling, near-syncope, orthopnea, palpitations, paroxysmal nocturnal dyspnea and syncope.   Respiratory: Negative for cough, hemoptysis, shortness of breath, sleep disturbances due to breathing, snoring and wheezing.    Endocrine: Negative for cold intolerance and heat intolerance.   Hematologic/Lymphatic: Bruises/bleeds easily.   Skin: Negative for color change, dry skin and nail changes.   Musculoskeletal: Positive for arthritis and back pain. Negative for joint pain and myalgias.   Gastrointestinal: Negative for bloating, abdominal pain, constipation, nausea and vomiting.   Genitourinary: Negative for dysuria, flank pain, hematuria and hesitancy.   Neurological: Negative for headaches, light-headedness, loss of balance, numbness, paresthesias and weakness.   Psychiatric/Behavioral: Negative for altered mental status.   Allergic/Immunologic: Negative for environmental allergies.     BP (!) 110/58 (BP Location: Left arm, Patient Position: Sitting)   Pulse 83   Ht 5' 9.5" (1.765 m)   Wt 83.9 kg (184 lb 15.5 oz)   SpO2 98%   BMI 26.92 kg/m²     Objective:   Physical Exam  Vitals and nursing note reviewed.   Constitutional:       General: He is not in acute distress.     Appearance: Normal appearance. He is " well-developed. He is not ill-appearing.   HENT:      Head: Normocephalic and atraumatic.      Nose: Nose normal.      Mouth/Throat:      Mouth: Mucous membranes are moist.   Eyes:      Pupils: Pupils are equal, round, and reactive to light.   Neck:      Thyroid: No thyromegaly.      Vascular: No JVD.      Trachea: No tracheal deviation.   Cardiovascular:      Rate and Rhythm: Normal rate and regular rhythm.      Chest Wall: PMI is not displaced.      Pulses: Intact distal pulses.           Radial pulses are 2+ on the right side and 2+ on the left side.        Dorsalis pedis pulses are 2+ on the right side and 2+ on the left side.      Heart sounds: S1 normal and S2 normal. Heart sounds not distant. No murmur heard.     Comments: S/p CRT-P in excellent repair  Pulmonary:      Effort: Pulmonary effort is normal. No respiratory distress.      Breath sounds: Normal breath sounds. No wheezing.   Abdominal:      General: Bowel sounds are normal. There is no distension.      Palpations: Abdomen is soft.      Tenderness: There is no abdominal tenderness.   Musculoskeletal:         General: No swelling. Normal range of motion.      Cervical back: Full passive range of motion without pain, normal range of motion and neck supple.      Right ankle: No swelling.      Left ankle: No swelling.   Skin:     General: Skin is warm and dry.      Capillary Refill: Capillary refill takes less than 2 seconds.      Nails: There is no clubbing.   Neurological:      General: No focal deficit present.      Mental Status: He is alert and oriented to person, place, and time. Mental status is at baseline.   Psychiatric:         Mood and Affect: Mood normal.         Speech: Speech normal.         Behavior: Behavior normal.         Thought Content: Thought content normal.         Judgment: Judgment normal.         Lab Results   Component Value Date    CHOL 126 07/07/2021    CHOL 171 03/20/2020    CHOL 174 09/20/2019     Lab Results   Component  Value Date    HDL 38 (L) 07/07/2021    HDL 38 (L) 03/20/2020    HDL 45 09/20/2019     Lab Results   Component Value Date    LDLCALC 52.4 (L) 07/07/2021    LDLCALC 75.0 03/20/2020    LDLCALC 87.8 09/20/2019     Lab Results   Component Value Date    TRIG 178 (H) 07/07/2021    TRIG 290 (H) 03/20/2020    TRIG 206 (H) 09/20/2019     Lab Results   Component Value Date    CHOLHDL 30.2 07/07/2021    CHOLHDL 22.2 03/20/2020    CHOLHDL 25.9 09/20/2019       Chemistry        Component Value Date/Time     07/07/2022 1313    K 4.7 07/07/2022 1313     07/07/2022 1313    CO2 20 (L) 07/07/2022 1313    BUN 27 (H) 07/07/2022 1313    CREATININE 1.6 (H) 07/07/2022 1313     (H) 07/07/2022 1313        Component Value Date/Time    CALCIUM 8.8 07/07/2022 1313    ALKPHOS 74 07/07/2022 1313    AST 11 07/07/2022 1313    ALT <5 (L) 07/07/2022 1313    BILITOT 0.3 07/07/2022 1313    ESTGFRAFRICA 46 (A) 07/07/2022 1313    EGFRNONAA 40 (A) 07/07/2022 1313          Lab Results   Component Value Date    TSH 1.268 11/04/2020     Lab Results   Component Value Date    INR 1.1 06/16/2022    INR 1.2 11/01/2020    INR 1.2 10/23/2020     Lab Results   Component Value Date    WBC 5.01 07/07/2022    HGB 8.2 (L) 07/07/2022    HCT 27.6 (L) 07/07/2022    MCV 91 07/07/2022     07/07/2022     1. TTE  Results for orders placed during the hospital encounter of 04/28/22    Echo    Interpretation Summary  · The left ventricle is normal in size with concentric hypertrophy and moderately decreased systolic function.  · The estimated ejection fraction is 35%.  · Grade III left ventricular diastolic dysfunction.  · Mild right ventricular enlargement with moderately reduced right ventricular systolic function.  · Mild left atrial enlargement.  · Mild right atrial enlargement.  · There is mild aortic valve stenosis.  · Aortic valve area is 1.53 cm2; peak velocity is 1.78 m/s; mean gradient is 7 mmHg.  · Mild mitral regurgitation.  · Mild tricuspid  regurgitation.  · Elevated central venous pressure (15 mmHg).  · The estimated PA systolic pressure is 37 mmHg.  · There is abnormal septal wall motion. There is systolic and diastolic flattening of the interventricular septum consistent with right ventricle pressure and volume overload.  · There is moderate left ventricular global hypokinesis.     Assessment:      1. Presence of cardiac resynchronization therapy pacemaker (CRT-P)    2. Longstanding persistent atrial fibrillation    3. Coronary artery disease of native artery of native heart with stable angina pectoris    4. Stage 4 chronic kidney disease    5. Iron deficiency anemia due to chronic blood loss    6. Mixed restrictive and obstructive lung disease    7. Obstructive sleep apnea        Plan:     Stop ASA  Continue eliquis for cardio-embolic protection  Dash diet 2 gm sodium restriction  Encourage daily walking as tolerated    CHF SYNOPSIS:    GDMT:  ACE/ARB/ARNI: Losartan 50 mg BID  Beta Blocker: none  MRA: Aldactone 25 mg daily  SGLT2: intolerant of farxiga  Diuretic: Lasix 40 mg BID    Keep appt in September with Device check as scheduled    Nicole May, FNP-C [facility]

## 2022-07-11 NOTE — TELEPHONE ENCOUNTER
Patient is also having an issue sleeping and would like to see if you will call in some Trazodone 50 mg in  lv-5/2/22  nv-none

## 2022-07-11 NOTE — PROGRESS NOTES
Subjective:      Patient ID: Jake Ortiz is a 82 y.o. male.    Chief Complaint: positive hemeocult    HPI:  Patient reports to clinic today for evaluation of positive occult blood. Patient has chronic anemia, though to be due to CKD. He has had numerous transfusions and infusions. Complete GI workup for GOYO completed [id number]. This included colonoscopy, EGD and VCE. Colonoscopy showed moderate sized internal hemorrhoids and EGD showed mild gastritis. VCE was unremarkable. The only overt blood he has seen is occasion bright red blood on the toilet paper after numerous bowel movements. This accompanied some rectal burning. This is not currently occurring. Denies black stools. He is followed by heme onc for the anemia. Recently transfused 2 units due to symptomatic anemia. Occult blood x 3 positive. No additional complaints. Feels much improved. Sees heme onc this Friday.    Review of Systems   Constitutional: Positive for fatigue (occasional). Negative for activity change, appetite change, chills, diaphoresis and fever.   HENT: Negative for trouble swallowing.    Respiratory: Negative for cough and shortness of breath.    Cardiovascular: Negative for chest pain.   Gastrointestinal: Positive for anal bleeding and rectal pain (burning). Negative for abdominal distention, abdominal pain, blood in stool, constipation, diarrhea, nausea and vomiting.   Musculoskeletal: Positive for back pain (chronic).   Neurological: Negative for dizziness and weakness.   Psychiatric/Behavioral: Negative for dysphoric mood. The patient is not nervous/anxious.        Medical History: Reviewed    Social History: Reviewed    Allergies: Reviewed    Objective:     Physical Exam  Constitutional:       General: He is not in acute distress.     Appearance: Normal appearance. He is not ill-appearing, toxic-appearing or diaphoretic.   HENT:      Head: Normocephalic and atraumatic.   Eyes:      General: No scleral icterus.     Extraocular  Movements: Extraocular movements intact.   Cardiovascular:      Rate and Rhythm: Normal rate and regular rhythm.   Pulmonary:      Effort: Pulmonary effort is normal. No respiratory distress.      Breath sounds: Normal breath sounds.   Abdominal:      General: Bowel sounds are normal. There is no distension.      Palpations: Abdomen is soft. There is no mass.      Tenderness: There is no abdominal tenderness. There is no guarding.   Musculoskeletal:         General: Normal range of motion.      Cervical back: Normal range of motion.   Skin:     General: Skin is warm and dry.      Capillary Refill: Capillary refill takes 2 to 3 seconds.      Coloration: Pallor: mild.   Neurological:      Mental Status: He is alert and oriented to person, place, and time.         Assessment:     1. Severe anemia    2. Positive occult stool blood test    3. Internal hemorrhoids        Plan:     -Patient has had complete GI workup for chronic anemia (colonoscopy, EGD, VCE) which has been negative. Suspect positive occult blood due to internal hemorrhoids mentioned on previous colonoscopy. Patient also mentions occasional mild BRBPR with wiping.  -Patient due to see heme/onc this week. Will send message to discuss further with Dr. Nath.    Jake was seen today for positive hemeocult.    Diagnoses and all orders for this visit:    Severe anemia    Positive occult stool blood test    Internal hemorrhoids        No follow-ups on file.    Thank you for the opportunity to participate in the care of this patient.   Gaye Ramesh PA-C.

## 2022-07-11 NOTE — TELEPHONE ENCOUNTER
No new care gaps identified.  [facility] Embedded Care Gaps. Reference number: [id number]. 7/11/2022   7:15:34 AM CDT

## 2022-07-13 NOTE — TELEPHONE ENCOUNTER
Called pt, no answer; left message informing pt I was calling to remind pt of his in office EAWV on 7/14/22 and to return my call if he had any questions; left my name and number

## 2022-07-14 NOTE — PROGRESS NOTES
"Jake Ortiz presented for a  Medicare AWV and comprehensive Health Risk Assessment today. The following components were reviewed and updated:    · Medical history  · Family History  · Social history  · Allergies and Current Medications  · Health Risk Assessment  · Health Maintenance  · Care Team         ** See Completed Assessments for Annual Wellness Visit within the encounter summary.**         The following assessments were completed:  · Living Situation  · CAGE  · Depression Screening  · Timed Get Up and Go  · Whisper Test  · Cognitive Function Screening  · Nutrition Screening  · ADL Screening  · PAQ Screening        Vitals:    07/14/22 1014   BP: 128/62   Pulse: 78   Temp: 97.7 °F (36.5 °C)   SpO2: 98%   Weight: 84.9 kg (187 lb 2.7 oz)   Height: 5' 9.5" (1.765 m)     Body mass index is 27.24 kg/m².  Physical Exam          Diagnoses and health risks identified today and associated recommendations/orders:    1. Mixed hyperlipidemia    - Lipid Panel; Future    2. DDD (degenerative disc disease), lumbar  Stable continue treatment plan     3. Lumbar radiculopathy  Stable continue treatmnet plan     4. Spondylosis without myelopathy or radiculopathy, lumbar region  Stable continue treatment plan     5. Lumbar spondylosis  Stable continue treatment plan     6. Lumbar radiculopathy, chronic  Stable continue treatment plan     7. Mixed restrictive and obstructive lung disease  Stable continue treatment plan     8. Chronic restrictive lung disease  Stable continue treatment plan     9. Primary hypertension  Stable continue treatment plan     10. Coronary artery disease of native artery of native heart with stable angina pectoris  Stable continue treatment plan     11. S/P CABG x 3      12. Arteriosclerosis of aorta  Stable continue treatment plan     13. Longstanding persistent atrial fibrillation  Stable continue treatment plan     14. Cardiomegaly  Stable continue treatment plan     15. 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 Biventricular pacemaker check  Stable continue treatment plan     17. Elevated troponin  Stable continue treatment plan     18. Presence of cardiac resynchronization therapy pacemaker (CRT-P)  Stable continue treatment plan     19. VAIBHAV (acute kidney injury)  Stable continue treatment plan     20. Stage 4 chronic kidney disease  Stable continue treatment plan     21. Rheumatoid factor positive  Stable continue treatment plan     22. Granulomatous disease  Stable continue treatment plan     23. Iron deficiency anemia due to chronic blood loss  Managed per heme onc    24. Anemia associated with stage 4 chronic renal failure  Managed per heme and nephr    25. Secondary hyperparathyroidism of renal origin  Managed per nephro    26. Gastroesophageal reflux disease without esophagitis  Stable continue treatment plan     27. Obstructive sleep apnea  Stable continue treatment plan     28. Periodic limb movement disorder (PLMD)  Stable continue treatment plan       Provided Jake with a 5-10 year written screening schedule and personal prevention plan. Recommendations were developed using the USPSTF age appropriate recommendations. Education, counseling, and referrals were provided as needed. After Visit Summary printed and given to patient which includes a list of additional screenings\tests needed.    No follow-ups on file.    Shraddha Alvarenga NP  I offered to discuss end of life issues, including information on how to make advance directives that the patient could use to name someone who would make medical decisions on their behalf if they became too ill to make themselves.    _X_Patient declined  ___Patient is interested, I provided paper work and offered to discuss.

## 2022-07-15 NOTE — PROGRESS NOTES
Subjective:       Patient ID: Jake Ortiz is a 82 y.o. male.    Chief Complaint: Results, Chronic Renal Failure, and Anemia    HPI 82-year-old male history of anemia secondary chronic renal failure patient returns for follow-up patient continues on erythropoietin 53193 units weekly.  History of prostate cancer in distant past.  ECOG status 1 seen in video visit consultation    Past Medical History:   Diagnosis Date    Abnormal PFT     Anemia     Arthritis     Atrial fibrillation 10/19/2017    Back pain     Congestive heart failure 2018    Coronary artery disease     DM (diabetes mellitus)      am 2020    DM (diabetes mellitus)      am 2021    Hyperlipemia     Hypertension     Myocardial infarction     NASREEN (obstructive sleep apnea) 2018    Prostate cancer 2015    Tobacco dependence     Type 2 diabetes mellitus      Family History   Problem Relation Age of Onset    Heart attack Mother     Diabetes Mother     Heart disease Mother     Cataracts Mother     Stroke Father     Heart disease Father     Heart disease Brother      Social History     Socioeconomic History    Marital status:     Number of children: 0   Occupational History    Occupation: retired     Employer: [employer]   Tobacco Use    Smoking status: Former Smoker     Packs/day: 1.50     Years: 20.00     Pack years: 30.00     Types: Cigarettes     Start date:      Quit date:      Years since quittin.5    Smokeless tobacco: Never Used   Substance and Sexual Activity    Alcohol use: No    Drug use: No    Sexual activity: Not Currently     Social Determinants of Health     Financial Resource Strain: Low Risk     Difficulty of Paying Living Expenses: Not very hard   Food Insecurity: No Food Insecurity    Worried About Running Out of Food in the Last Year: Never true    Ran Out of Food in the Last Year: Never true   Transportation Needs: No Transportation  Needs    Lack of Transportation (Medical): No    Lack of Transportation (Non-Medical): No   Physical Activity: Inactive    Days of Exercise per Week: 0 days    Minutes of Exercise per Session: 0 min   Stress: No Stress Concern Present    Feeling of Stress : Not at all   Social Connections: Socially Integrated    Frequency of Communication with Friends and Family: More than three times a week    Frequency of Social Gatherings with Friends and Family: Three times a week    Attends Jain Services: More than 4 times per year    Active Member of Clubs or Organizations: Yes    Attends Club or Organization Meetings: More than 4 times per year    Marital Status:    Housing Stability: Low Risk     Unable to Pay for Housing in the Last Year: No    Number of Places Lived in the Last Year: 0    Unstable Housing in the Last Year: No     Past Surgical History:   Procedure Laterality Date    COLONOSCOPY N/A 9/22/2020    Procedure: COLONOSCOPY;  Surgeon: Javier Farmer MD;  Location: [facility];  Service: Endoscopy;  Laterality: N/A;    CORONARY ARTERY BYPASS GRAFT  1987    EPIDURAL STEROID INJECTION N/A 11/22/2019    Procedure: Lumbar L5/S1 IL XUAN;  Surgeon: Tacho Trivedi MD;  Location: [facility] PAIN MGT;  Service: Pain Management;  Laterality: N/A;    EPIDURAL STEROID INJECTION N/A 1/6/2020    Procedure: Lumbar L5/S1 IL XUAN;  Surgeon: Tacho Trivedi MD;  Location: [facility] PAIN MGT;  Service: Pain Management;  Laterality: N/A;    EPIDURAL STEROID INJECTION N/A 2/10/2020    Procedure: Caudal XUAN;  Surgeon: Tacho Trivedi MD;  Location: [facility] PAIN MGT;  Service: Pain Management;  Laterality: N/A;    ESOPHAGOGASTRODUODENOSCOPY N/A 9/22/2020    Procedure: EGD (ESOPHAGOGASTRODUODENOSCOPY);  Surgeon: Javier Farmer MD;  Location: [facility] ENDO;  Service: Endoscopy;  Laterality: N/A;    INJECTION OF ANESTHETIC AGENT AROUND MEDIAL BRANCH NERVES INNERVATING LUMBAR FACET JOINT Bilateral  10/12/2020    Procedure: Bilateral L3-5 MBB;  Surgeon: Tacho Trivedi MD;  Location: [facility] PAIN MGT;  Service: Pain Management;  Laterality: Bilateral;    INJECTION OF ANESTHETIC AGENT AROUND MEDIAL BRANCH NERVES INNERVATING LUMBAR FACET JOINT Bilateral 12/21/2020    Procedure: Bilateral L3-5 MBB with steroid;  Surgeon: Tacho Trivedi MD;  Location: [facility] PAIN MGT;  Service: Pain Management;  Laterality: Bilateral;    INTRALUMINAL GASTROINTESTINAL TRACT IMAGING VIA CAPSULE N/A 12/29/2021    Procedure: IMAGING PROCEDURE, GI TRACT, INTRALUMINAL, VIA CAPSULE;  Surgeon: Tahir Geronimo RN;  Location: [facility] ENDO;  Service: Endoscopy;  Laterality: N/A;    PROSTATE SURGERY      prostate radiation    RADIOFREQUENCY THERMOCOAGULATION Left 6/4/2021    Procedure: Left L3-5 Lumbar RFA;  Surgeon: Tacho Trivedi MD;  Location: [facility] PAIN MGT;  Service: Pain Management;  Laterality: Left;    RADIOFREQUENCY THERMOCOAGULATION Right 6/18/2021    Procedure: Right L3-5 Lumbar RFA;  Surgeon: Tacho Trivedi MD;  Location: [facility] PAIN MGT;  Service: Pain Management;  Laterality: Right;    REPLACEMENT OF PACEMAKER GENERATOR Left 6/16/2022    Procedure: REPLACEMENT, PULSE GENERATOR, CARDIAC PACEMAKER/CRT-P device;  Surgeon: Darrel Carrero MD;  Location: [facility] CATH LAB;  Service: Cardiology;  Laterality: Left;  NOT MRI safe   Pacer and leads implanted 9/30/2017, Dr. Souleymane SANTACRUZ Consulta CRT-P C4TR01, [id number]   A lead: Mdt 5076 CapSureFix® Novus, [id number]   RV lead: Mdt 5076 CapSureFix® Novus, [id number]   LV lead: Jovan 4196 At    SELECTIVE INJECTION OF ANESTHETIC AGENT AROUND LUMBAR SPINAL NERVE ROOT BY TRANSFORAMINAL APPROACH Bilateral 6/8/2022    Procedure: Bilateral L3/4 TF XUAN with RN IV sedation;  Surgeon: Philipp Demarco MD;  Location: [facility] PAIN MGT;  Service: Pain Management;  Laterality: Bilateral;    SPINE SURGERY      fusion    TONSILLECTOMY         Labs:  Lab Results   Component Value Date    WBC 5.08  07/14/2022    HGB 7.8 (L) 07/14/2022    HCT 27.6 (L) 07/14/2022    MCV 92 07/14/2022     07/14/2022     BMP  Lab Results   Component Value Date     07/14/2022    K 4.7 07/14/2022     07/14/2022    CO2 21 (L) 07/14/2022    BUN 30 (H) 07/14/2022    CREATININE 1.5 (H) 07/14/2022    CALCIUM 9.4 07/14/2022    ANIONGAP 12 07/14/2022    ESTGFRAFRICA 50 (A) 07/14/2022    EGFRNONAA 43 (A) 07/14/2022     Lab Results   Component Value Date    ALT 7 (L) 07/14/2022    AST 15 07/14/2022    GGT 58 (H) 08/08/2018    ALKPHOS 76 07/14/2022    BILITOT 0.4 07/14/2022       Lab Results   Component Value Date    IRON 99 04/20/2022    TIBC 290 04/20/2022    FERRITIN 966 (H) 04/20/2022     Lab Results   Component Value Date    GGENCPHC77 458 07/07/2021     Lab Results   Component Value Date    FOLATE 18.2 07/07/2021     Lab Results   Component Value Date    TSH 1.268 11/04/2020         Review of Systems   Constitutional: Positive for fatigue.   Neurological: Positive for weakness.   Psychiatric/Behavioral: Positive for dysphoric mood. The patient is nervous/anxious.        Objective:      Physical Exam  Constitutional:       Appearance: Normal appearance.   Neurological:      Mental Status: He is alert.             Assessment:      1. Anemia associated with stage 4 chronic renal failure    2. Iron deficiency anemia due to chronic blood loss    3. 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    4. Diabetes mellitus type 2 in obese    5. Stage 4 chronic kidney disease    6. Adenocarcinoma of prostate           Plan:     The patient location is:  office opposite side of [facility]  The chief complaint leading to consultation is:  Anemia renal failure    Visit type:  Synchronous audio video    Face to Face time with patient: 25 minutes of total time spent on the encounter, which includes face to face time and non-face to face time preparing to see the patient (eg, review of tests), Obtaining and/or reviewing separately obtained  history, Documenting clinical information in the electronic or other health record, Independently interpreting results (not separately reported) and communicating results to the patient/family/caregiver, or Care coordination (not separately reported).         Each patient to whom he or she provides medical services by telemedicine is:  (1) informed of the relationship between the physician and patient and the respective role of any other health care provider with respect to management of the patient; and (2) notified that he or she may decline to receive medical services by telemedicine and may withdraw from such care at any time.    Notes:   Extensive conversation reviewed information with patient at this time marked improvement in hemoglobin continue with aggressive use of erythropoietin 38634 units on a weekly basis.  Will recheck in approximately 1 month CBC BMP iron status and PSA can be seen back by KAYLIN Mary Jane Allen has seen him in the past.  Discussed implications answered questions orders written reviewed      Tristin Nath Jr, MD FACP

## 2022-07-15 NOTE — DISCHARGE INSTRUCTIONS
.[facility]  [address] [facility] [address]  [address]  [phone] phone     [phone] fax  Hours of Operation: Monday- Friday 8:00am- 5:00pm  After hours phone  [phone]  Hematology / Oncology Physicians on call    Dr. Portillo Swenson      Nurse Practitioners:    Natalie Ying, NEGIN Virgen, NEGIN Mckay, NEGIN Manzo, CARMELINA Melvin      Please don't hesitate to call if you have any concerns.

## 2022-07-15 NOTE — NURSING
.Injection given without difficulties.Bandaid applied. Patient instructed to stay in the clinic for 15 minutes. Patient verbalized understanding and will notify nurse with any complaints.

## 2022-07-19 NOTE — TELEPHONE ENCOUNTER
Called and spoke to patient - Call dropped - Was unable to re-reach patient after multiple attempts - Left voicemail message advising patient of his scheduled appointment with Dr Moody for Monday 8/8 [facility]  @ 0920 - Left my name and call back number [phone]      ----- Message from Gaye Ramesh PA-C sent at 7/19/2022  9:39 AM CDT -----  Hanh,    I wasn't sure when your last day was. This is a chronic GOYO patient. He has had EGD, colonoscopy and VCE with no real findings. Recently had occult blood +. Has history of hemorrhoids. I spoke with heme onc as well. Was curious if we could get him in to see Dr. Moody for evaluation of these hemorrhoids to see if maybe they could be the cause of his positive occult blood before we try to repeat all of these procedures.     Thanks!  Gaye  ----- Message -----  From: Gaye Ramesh PA-C  Sent: 7/11/2022   1:01 PM CDT  To: Gaye Ramesh PA-C    Check on anemia.

## 2022-07-29 NOTE — DISCHARGE INSTRUCTIONS
.[facility]  [address] [address]  [address]  [phone] phone     [phone] fax  Hours of Operation: Monday- Friday 8:00am- 5:00pm  After hours phone  [phone]  Hematology / Oncology Physicians on call    Dr. Portillo Seay      Nurse Practitioners:    Natalie Ying, NEGIN Virgen, NEGIN Mckay, NEGIN Manzo, NEGIN Hatfield, CARMELINA Melvin      Please don't hesitate to call if you have any concerns.

## 2022-08-02 NOTE — TELEPHONE ENCOUNTER
Spoke with  Diana and he wanted to know why he was scheduled with Pb. I explained to him that we received his referral and he spoke with my coworker on 07/22/22 to schedule. He stated he would like to cancel the appt for now.

## 2022-08-05 NOTE — TELEPHONE ENCOUNTER
Left patient voicemail stating appointment details on 8/8 Monday at 9:20 am at [facility] ([address]) on 4th floor. Left call back number [phone].

## 2022-08-08 NOTE — PROGRESS NOTES
History & Physical    SUBJECTIVE:     History of Present Illness:  Patient is a 82 y.o. male presents for further evaluation of hemorrhoidal disease given a recent positive occult blood stool test. He has a history of anemia which he has had several blood transfusions for in the past. He does not have frequent BRBPR. He only notices this about nce a month when he has a bout of diarrhea during which he has 6-7 loose bowel movements per day. He has no rectal bleeding otherwise and denies anal pain or external lesions. He rarely has hard stools after taking imodium, does not strain, only drinks about 24 ounces of water per day due to his CKD, sits for less than 5 minutes, does not take fiber, stool softeners, or laxatives, and uses toilet paper only. His last colonoscopy was in 2020 and had one polyp and internal hemorrhoids. He denies family history of CRC. He has unintentionally lost 30 pounds over the last 3-4 months. He denies associated abdominal pain, night sweats, fevers, chills, nausea, and vomiting. He takes eliquis and aspirin.     Chief Complaint   Patient presents with    Other     Positive Occult blood       Review of patient's allergies indicates:  No Known Allergies    Current Outpatient Medications   Medication Sig Dispense Refill    acetaminophen (TYLENOL) 500 MG tablet Take 2 tablets (1,000 mg total) by mouth every 6 (six) hours as needed for Pain.  0    apixaban (ELIQUIS) 2.5 mg Tab Take 1 tablet (2.5 mg total) by mouth 2 (two) times daily. 180 tablet 1    aspirin 81 MG Chew Take 1 tablet (81 mg total) by mouth once daily. 30 tablet 0    atorvastatin (LIPITOR) 80 MG tablet One tablet daily (Patient taking differently: Take 80 mg by mouth every evening.) 90 tablet 1    blood sugar diagnostic Strp Check blood glucose levels daily in the AM fasting and 1-2 times more daily. 300 strip 3    cefadroxil (DURICEF) 500 MG Cap Take 1 capsule (500 mg total) by mouth every 12 (twelve) hours. 6 capsule 0     cholecalciferol, vitamin D3, (VITAMIN D3) 25 mcg (1,000 unit) capsule Take 1,000 Units by mouth once daily.      clonazePAM (KLONOPIN) 1 MG tablet Take 1 tablet (1 mg total) by mouth nightly as needed for Anxiety. 90 tablet 0    fluticasone propionate (FLONASE) 50 mcg/actuation nasal spray USE 2 SPRAYS IN EACH NOSTRIL EVERY DAY 48 g 5    furosemide (LASIX) 40 MG tablet Take 1 tablet (40 mg total) by mouth 2 (two) times daily. Can double to 2 tablets twice a day for 3 days for more swelling/fluid build up - take 80mg twice a day 90 tablet 1    krill/om-3/dha/epa/phospho/ast (MEGARED OMEGA-3 KRILL OIL ORAL) Take 1 capsule by mouth every morning.      lancets Misc Check blood glucose levels daily in the AM fasting and 1-2 times more daily. 300 each 3    levocetirizine (XYZAL) 5 MG tablet TAKE 1 TABLET EVERY EVENING 90 tablet 1    losartan (COZAAR) 50 MG tablet Take 1 tablet (50 mg total) by mouth once daily. 90 tablet 3    magnesium oxide (MAG-OX) 400 mg (241.3 mg magnesium) tablet Take 1 tablet (400 mg total) by mouth once daily. 90 tablet 1    multivitamin capsule Take 1 capsule by mouth once daily.      pantoprazole (PROTONIX) 40 MG tablet Take 1 tablet (40 mg total) by mouth once daily. 90 tablet 3    potassium chloride SA (K-DUR,KLOR-CON) 20 MEQ tablet Take 1 tablet (20 mEq total) by mouth once daily. 90 tablet 1    spironolactone (ALDACTONE) 25 MG tablet Take 1 tablet (25 mg total) by mouth once daily. 90 tablet 1    vit A/vit C/vit E/zinc/copper (OCUVITE PRESERVISION ORAL) Take 1 capsule by mouth every evening.       No current facility-administered medications for this visit.     Facility-Administered Medications Ordered in Other Visits   Medication Dose Route Frequency Provider Last Rate Last Admin    ondansetron injection 4 mg  4 mg Intravenous Once PRN Tacho Trivedi MD           Past Medical History:   Diagnosis Date    Abnormal PFT     Anemia     Arthritis     Atrial fibrillation  10/19/2017    Back pain     Congestive heart failure 03/05/2018    Coronary artery disease     DM (diabetes mellitus) 2018     am 06/23/2020    DM (diabetes mellitus) 2016     am 08/17/2021    Hyperlipemia     Hypertension     Myocardial infarction     NASREEN (obstructive sleep apnea) 06/05/2018    Prostate cancer 2015    Tobacco dependence     Type 2 diabetes mellitus      Past Surgical History:   Procedure Laterality Date    COLONOSCOPY N/A 9/22/2020    Procedure: COLONOSCOPY;  Surgeon: Javier Farmer MD;  Location: [facility];  Service: Endoscopy;  Laterality: N/A;    CORONARY ARTERY BYPASS GRAFT  1987    EPIDURAL STEROID INJECTION N/A 11/22/2019    Procedure: Lumbar L5/S1 IL XUAN;  Surgeon: Tacho Trivedi MD;  Location: [facility];  Service: Pain Management;  Laterality: N/A;    EPIDURAL STEROID INJECTION N/A 1/6/2020    Procedure: Lumbar L5/S1 IL XUAN;  Surgeon: Tacho Trivedi MD;  Location: [facility];  Service: Pain Management;  Laterality: N/A;    EPIDURAL STEROID INJECTION N/A 2/10/2020    Procedure: Caudal XUAN;  Surgeon: Tacho Trivedi MD;  Location: [facility];  Service: Pain Management;  Laterality: N/A;    ESOPHAGOGASTRODUODENOSCOPY N/A 9/22/2020    Procedure: EGD (ESOPHAGOGASTRODUODENOSCOPY);  Surgeon: Javier Farmer MD;  Location[facility];  Service: Endoscopy;  Laterality: N/A;    INJECTION OF ANESTHETIC AGENT AROUND MEDIAL BRANCH NERVES INNERVATING LUMBAR FACET JOINT Bilateral 10/12/2020    Procedure: Bilateral L3-5 MBB;  Surgeon: Tacho Trivedi MD;  Location: [facility];  Service: Pain Management;  Laterality: Bilateral;    INJECTION OF ANESTHETIC AGENT AROUND MEDIAL BRANCH NERVES INNERVATING LUMBAR FACET JOINT Bilateral 12/21/2020    Procedure: Bilateral L3-5 MBB with steroid;  Surgeon: Tacho Trivedi MD;  Location: [facility];  Service: Pain Management;  Laterality: Bilateral;    INTRALUMINAL GASTROINTESTINAL TRACT  IMAGING VIA CAPSULE N/A 2021    Procedure: IMAGING PROCEDURE, GI TRACT, INTRALUMINAL, VIA CAPSULE;  Surgeon: Tahir Geronimo RN;  Location: [facility] ENDO;  Service: Endoscopy;  Laterality: N/A;    PROSTATE SURGERY      prostate radiation    RADIOFREQUENCY THERMOCOAGULATION Left 2021    Procedure: Left L3-5 Lumbar RFA;  Surgeon: Tacho Trivedi MD;  Location: [facility] PAIN [facility]GT;  Service: Pain Management;  Laterality: Left;    RADIOFREQUENCY THERMOCOAGULATION Right 2021    Procedure: Right L3-5 Lumbar RFA;  Surgeon: Tacho Trivedi MD;  Location: [facility]IN [facility]GT;  Service: Pain Management;  Laterality: Right;    REPLACEMENT OF PACEMAKER GENERATOR Left 2022    Procedure: REPLACEMENT, PULSE GENERATOR, CARDIAC PACEMAKER/CRT-P device;  Surgeon: Darrel Carrero MD;  Location[facility] CATH LAB;  Service: Cardiology;  Laterality: Left;  NOT MRI safe   Pacer and leads implanted 2017, Dr. Souleymane CARROLLT Consulta CRT-P C4TR01, [id number]   A lead: Mdt 5076 CapSureFix® Novus, [id number]   RV lead: Mdt 5076 CapSureFix® Novus, [id number]   LV lead: Mdt 4196 At    SELECTIVE INJECTION OF ANESTHETIC AGENT AROUND LUMBAR SPINAL NERVE ROOT BY TRANSFORAMINAL APPROACH Bilateral 2022    Procedure: Bilateral L3/4 TF XUAN with RN IV sedation;  Surgeon: Philipp Demarco MD;  Location: [facility] PAIN MGT;  Service: Pain Management;  Laterality: Bilateral;    SPINE SURGERY      fusion    TONSILLECTOMY       Family History   Problem Relation Age of Onset    Heart attack Mother     Diabetes Mother     Heart disease Mother     Cataracts Mother     Stroke Father     Heart disease Father     Heart disease Brother      Social History     Tobacco Use    Smoking status: Former Smoker     Packs/day: 1.50     Years: 20.00     Pack years: 30.00     Types: Cigarettes     Start date:      Quit date:      Years since quittin.6    Smokeless tobacco: Never Used   Substance Use Topics    Alcohol use: No    Drug use:  No        Review of Systems:  Review of Systems   Constitutional: Positive for unexpected weight change. Negative for chills and fever.   HENT: Negative for congestion.    Eyes: Negative for visual disturbance.   Respiratory: Negative for shortness of breath.    Cardiovascular: Negative for chest pain.   Gastrointestinal: Positive for anal bleeding, blood in stool and diarrhea. Negative for abdominal distention, abdominal pain, constipation, nausea, rectal pain and vomiting.   Genitourinary: Negative for dysuria.   Musculoskeletal: Negative for arthralgias.   Skin: Negative for rash.   Neurological: Negative for light-headedness.   Hematological: Negative for adenopathy.       OBJECTIVE:     Vital Signs (Most Recent)  Pulse: 84 (08/08/22 0932)  BP: (!) 95/53 (08/08/22 0932)     87.3 kg (192 lb 6.4 oz)     Physical Exam:  Physical Exam  Vitals reviewed. Exam conducted with a chaperone present.   Constitutional:       General: He is not in acute distress.     Appearance: He is well-developed. He is not ill-appearing.   HENT:      Head: Normocephalic and atraumatic.      Right Ear: External ear normal.      Left Ear: External ear normal.   Eyes:      Extraocular Movements: Extraocular movements intact.      Conjunctiva/sclera: Conjunctivae normal.   Cardiovascular:      Rate and Rhythm: Normal rate.   Pulmonary:      Effort: Pulmonary effort is normal. No respiratory distress.   Abdominal:      General: There is no distension.      Palpations: Abdomen is soft.      Tenderness: There is no abdominal tenderness.   Genitourinary:     Comments: Anorectal: No external masses, lesions, or TTP. KASIE with good tone, no blood, and no palpable masses or lesions.  Musculoskeletal:      Cervical back: Normal range of motion and neck supple.   Skin:     General: Skin is warm and dry.   Neurological:      Mental Status: He is alert and oriented to person, place, and time.   Psychiatric:         Behavior: Behavior normal.          Anoscopy Procedure Note    Pre-procedure diagnosis: Rectal bleeding    Post-procedure diagnosis: Internal hemorrhoids    Procedure: Anoscopy    Surgeon: Victor Hugo Moody MD    Assistant: Lexy Flowers PA-C    Specimen: none    Findings: Anoscope inserted and all 4 quadrants examined. Very mild enlarged left lateral, right anterior, and right posterior internal hemorrhoids without evidence of recent bleeding or prolapse. No other internal masses or lesions.     Diagnostic Results:  Colonoscopy 2020 reviewed.     ASSESSMENT/PLAN:     81yo M with multiple medical comorbidities who long-standing anemia requiring blood transfusions with known hemorrhoids and minimal BRBPR    - Long discussion with the patient regarding hemorrhoidal disease, causes and treatment options.  Discussed that is he is having minimal blood per rectum, including with bowel movements, I do not think is hemorrhoid disease is a likely large contributing factor to his anemia and blood transfusion requirement.  Given his ongoing use of Eliquis and aspirin, I would not recommend holding these in the perioperative period for intervention on the hemorrhoids I think the risk of holding the anticoagulation outweighs the benefits of interventions on the hemorrhoids.  - given his fecal occult blood test, it is reasonable to consider repeating his endoscopy with an upper and lower endoscopy.  Will defer this to Gastroenterology.  - if becomes more symptomatic from hemorrhoidal standpoint, we could consider banding versus surgical excision.  However banding would require holding the anticoagulation for longer period time and surgical excision would require less holding.  However given his minimal enlargement and minimal symptoms at this point, would not recommend aggressive intervention on the hemorrhoids at this time.  - Discussed that a minimum I would recommend behavioral, lifestyle and medication modifications to improve bowel habits. Usual  bowel management handout given to patient. This includes a stool softener twice per day, fiber powder supplementation daily, drinking at least 64oz of water/day, avoiding straining with bowel movements, spending less than 5 min on toilet per bowel movement, eating a high fiber diet, using miralax as needed to achieve a bowel movement daily and using wet wipes to wipe after bowel movements when irritated.   - RTC PRN    Victor Hugo Moody MD  Colon and Rectal Surgery  [facility]

## 2022-08-10 NOTE — PROGRESS NOTES
Subjective:       Patient ID: Jake Ortiz is a 82 y.o. male.    Chief Complaint:   1. 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 Stage 4 chronic kidney disease       Current Treatment:  EPOETIN REAGAN (RETACRIT) WEEKLY     Treatment History:  IV Injectafer x 2 doses on 4/7/2022 & 4/14/2022  Retacrit 40,000 units on 3/25/2022    HPI: This is an 82 year old man who initially presented to Hem/Onc in 11/2020 for progressive anemia requiring blood transfusion. Persistent elevation of creatinine, occasional elevation of BUN, and consistently low eGFR indicated he had renal insufficiency and he was diagnosed with anemia secondary to renal failure. He was started on Procrit 20,000 units every 2 weeks and referred to Nephrology.      GI work up done in 9/2020 revealed a colonic polyp and non bleeding internal hemorrhoids; it was recommended he repeat in 5 years. Capsule endoscopy in 12/2021 was unremarkable. SPEP revealed normal total protein and normal pattern. Kappa and lambda light chains were elevated; ratio was WNL.      His primary Hematologist/Oncologist is Dr. Nath.    Interval History: Patient presents for follow up on Retacrit; He is scheduled to receive a dose today. He presents alone and denies fatigue, weakness, dizziness, lightheadedness, headaches. He does report SOB with activity.     Reviewed labs with patient:   CBC:   Recent Labs   Lab 08/12/22  1259   WBC 4.90   RBC 2.56 L   Hemoglobin 6.0 L   Hematocrit 21.5 L   Platelets 324   MCV 84   MCH 23.4 L   MCHC 27.9 L     CMP:  Recent Labs   Lab 07/14/22  1048 08/12/22  1259   Glucose 90 88   Calcium 9.4 8.8   Albumin 4.0  --    Total Protein 7.5  --    Sodium 138 138   Potassium 4.7 4.6   CO2 21 L 18 L   Chloride 105 109   BUN 30 H 35 H   Creatinine 1.5 H 2.2 H   Alkaline Phosphatase 76  --    ALT 7 L  --    AST 15  --    Total Bilirubin 0.4  --      Given his significant anemia, patient needs blood. He is aware that he needs a blood  transfusion but states he does not want to go to the ER; he would prefer to receive blood in outpatient infusion on Monday. In light of this, will proceed with Retacrit injection today and plan for type and screen for 2U PRBCs on Monday.     Social History     Socioeconomic History    Marital status:     Number of children: 0   Occupational History    Occupation: retired     Employer: [employer]   Tobacco Use    Smoking status: Former Smoker     Packs/day: 1.50     Years: 20.00     Pack years: 30.00     Types: Cigarettes     Start date:      Quit date:      Years since quittin.6    Smokeless tobacco: Never Used   Substance and Sexual Activity    Alcohol use: No    Drug use: No    Sexual activity: Not Currently     Social Determinants of Health     Financial Resource Strain: Low Risk     Difficulty of Paying Living Expenses: Not hard at all   Food Insecurity: No Food Insecurity    Worried About Running Out of Food in the Last Year: Never true    Ran Out of Food in the Last Year: Never true   Transportation Needs: No Transportation Needs    Lack of Transportation (Medical): No    Lack of Transportation (Non-Medical): No   Physical Activity: Inactive    Days of Exercise per Week: 0 days    Minutes of Exercise per Session: 0 min   Stress: No Stress Concern Present    Feeling of Stress : Not at all   Social Connections: Socially Integrated    Frequency of Communication with Friends and Family: More than three times a week    Frequency of Social Gatherings with Friends and Family: More than three times a week    Attends Taoist Services: More than 4 times per year    Active Member of Clubs or Organizations: Yes    Attends Club or Organization Meetings: More than 4 times per year    Marital Status:    Housing Stability: Low Risk     Unable to Pay for Housing in the Last Year: No    Number of Places Lived in the Last Year: 1    Unstable Housing in the Last Year:  No     Past Medical History:   Diagnosis Date    Abnormal PFT     Anemia     Arthritis     Atrial fibrillation 10/19/2017    Back pain     Congestive heart failure 03/05/2018    Coronary artery disease     DM (diabetes mellitus) 2018     am 06/23/2020    DM (diabetes mellitus) 2016     am 08/17/2021    Hyperlipemia     Hypertension     Myocardial infarction     NASREEN (obstructive sleep apnea) 06/05/2018    Prostate cancer 2015    Tobacco dependence     Type 2 diabetes mellitus      Family History   Problem Relation Age of Onset    Heart attack Mother     Diabetes Mother     Heart disease Mother     Cataracts Mother     Stroke Father     Heart disease Father     Heart disease Brother      Past Surgical History:   Procedure Laterality Date    COLONOSCOPY N/A 9/22/2020    Procedure: COLONOSCOPY;  Surgeon: Javier Farmer MD;  Location: [facility] ENDO;  Service: Endoscopy;  Laterality: N/A;    CORONARY ARTERY BYPASS GRAFT  1987    EPIDURAL STEROID INJECTION N/A 11/22/2019    Procedure: Lumbar L5/S1 IL XUAN;  Surgeon: Tacho Trivedi MD;  Location: [facility]IN [facility]GT;  Service: Pain Management;  Laterality: N/A;    EPIDURAL STEROID INJECTION N/A 1/6/2020    Procedure: Lumbar L5/S1 IL XUAN;  Surgeon: Tacho Trivedi MD;  Location: [facility];  Service: Pain Management;  Laterality: N/A;    EPIDURAL STEROID INJECTION N/A 2/10/2020    Procedure: Caudal XUAN;  Surgeon: Tacho Trivedi MD;  Location: [facility];  Service: Pain Management;  Laterality: N/A;    ESOPHAGOGASTRODUODENOSCOPY N/A 9/22/2020    Procedure: EGD (ESOPHAGOGASTRODUODENOSCOPY);  Surgeon: Javier Farmer MD;  Location: [facility] ENDO;  Service: Endoscopy;  Laterality: N/A;    INJECTION OF ANESTHETIC AGENT AROUND MEDIAL BRANCH NERVES INNERVATING LUMBAR FACET JOINT Bilateral 10/12/2020    Procedure: Bilateral L3-5 MBB;  Surgeon: Tacho Trivedi MD;  Location: [facility] PAIN MGT;  Service: Pain Management;   Laterality: Bilateral;    INJECTION OF ANESTHETIC AGENT AROUND MEDIAL BRANCH NERVES INNERVATING LUMBAR FACET JOINT Bilateral 12/21/2020    Procedure: Bilateral L3-5 MBB with steroid;  Surgeon: Tacho Trivedi MD;  Location: [facility] PAIN MGT;  Service: Pain Management;  Laterality: Bilateral;    INTRALUMINAL GASTROINTESTINAL TRACT IMAGING VIA CAPSULE N/A 12/29/2021    Procedure: IMAGING PROCEDURE, GI TRACT, INTRALUMINAL, VIA CAPSULE;  Surgeon: Tahir Geronimo RN;  Location: [facility] ENDO;  Service: Endoscopy;  Laterality: N/A;    PROSTATE SURGERY      prostate radiation    RADIOFREQUENCY THERMOCOAGULATION Left 6/4/2021    Procedure: Left L3-5 Lumbar RFA;  Surgeon: Tacho Trivedi MD;  Location: [facility] PAIN MGT;  Service: Pain Management;  Laterality: Left;    RADIOFREQUENCY THERMOCOAGULATION Right 6/18/2021    Procedure: Right L3-5 Lumbar RFA;  Surgeon: Tacho Trivedi MD;  Location: [facility] PAIN MGT;  Service: Pain Management;  Laterality: Right;    REPLACEMENT OF PACEMAKER GENERATOR Left 6/16/2022    Procedure: REPLACEMENT, PULSE GENERATOR, CARDIAC PACEMAKER/CRT-P device;  Surgeon: Darrel Carrero MD;  Location[facility] CATH LAB;  Service: Cardiology;  Laterality: Left;  NOT MRI safe   Pacer and leads implanted 9/30/2017, Dr. Souleymane CARROLLT Consulta CRT-P C4TR01, [id number]   A lead: Mdt 5076 CapSureFix® Novus, [id number]   RV lead: Mdt 5076 CapSureFix® Novus, [id number]   LV lead: Mdt 4196 At    SELECTIVE INJECTION OF ANESTHETIC AGENT AROUND LUMBAR SPINAL NERVE ROOT BY TRANSFORAMINAL APPROACH Bilateral 6/8/2022    Procedure: Bilateral L3/4 TF XUAN with RN IV sedation;  Surgeon: Philipp Demarco MD;  Location: [facility] PAIN MGT;  Service: Pain Management;  Laterality: Bilateral;    SPINE SURGERY      fusion    TONSILLECTOMY       Review of Systems   Constitutional: Negative for appetite change and fatigue.   HENT: Negative for mouth sores, rhinorrhea and sore throat.    Eyes: Negative.    Respiratory: Positive for  shortness of breath (with activity).    Cardiovascular: Negative.    Gastrointestinal: Negative for constipation, diarrhea, nausea and vomiting.   Endocrine: Negative.    Genitourinary: Negative.    Musculoskeletal: Negative.    Integumentary:  Negative.   Allergic/Immunologic: Negative.    Neurological: Negative for dizziness, weakness, light-headedness, numbness and headaches.   Hematological: Negative.    Psychiatric/Behavioral: Negative.        Medication List with Changes/Refills   Current Medications    ACETAMINOPHEN (TYLENOL) 500 MG TABLET    Take 2 tablets (1,000 mg total) by mouth every 6 (six) hours as needed for Pain.    APIXABAN (ELIQUIS) 2.5 MG TAB    Take 1 tablet (2.5 mg total) by mouth 2 (two) times daily.    ATORVASTATIN (LIPITOR) 80 MG TABLET    Take 1 tablet (80 mg total) by mouth every evening.    BLOOD SUGAR DIAGNOSTIC STRP    Check blood glucose levels daily in the AM fasting and 1-2 times more daily.    CEFADROXIL (DURICEF) 500 MG CAP    Take 1 capsule (500 mg total) by mouth every 12 (twelve) hours.    CHOLECALCIFEROL, VITAMIN D3, (VITAMIN D3) 25 MCG (1,000 UNIT) CAPSULE    Take 1,000 Units by mouth once daily.    CLONAZEPAM (KLONOPIN) 1 MG TABLET    Take 1 tablet (1 mg total) by mouth nightly as needed for Anxiety.    FLUTICASONE PROPIONATE (FLONASE) 50 MCG/ACTUATION NASAL SPRAY    USE 2 SPRAYS IN EACH NOSTRIL EVERY DAY    FUROSEMIDE (LASIX) 40 MG TABLET    Take 1 tablet (40 mg total) by mouth 2 (two) times daily. Can double to 2 tablets twice a day for 3 days for more swelling/fluid build up - take 80mg twice a day    KRILL/OM-3/DHA/EPA/PHOSPHO/AST (MEGARED OMEGA-3 KRILL OIL ORAL)    Take 1 capsule by mouth every morning.    LANCETS MISC    Check blood glucose levels daily in the AM fasting and 1-2 times more daily.    LEVOCETIRIZINE (XYZAL) 5 MG TABLET    TAKE 1 TABLET EVERY EVENING    LOSARTAN (COZAAR) 50 MG TABLET    Take 1 tablet (50 mg total) by mouth once daily.    MAGNESIUM OXIDE  (MAG-OX) 400 MG (241.3 MG MAGNESIUM) TABLET    Take 1 tablet (400 mg total) by mouth once daily.    MULTIVITAMIN CAPSULE    Take 1 capsule by mouth once daily.    PANTOPRAZOLE (PROTONIX) 40 MG TABLET    Take 1 tablet (40 mg total) by mouth once daily.    POTASSIUM CHLORIDE SA (K-DUR,KLOR-CON) 20 MEQ TABLET    Take 1 tablet (20 mEq total) by mouth once daily.    SPIRONOLACTONE (ALDACTONE) 25 MG TABLET    Take 1 tablet (25 mg total) by mouth once daily.    VIT A/VIT C/VIT E/ZINC/COPPER (OCUVITE PRESERVISION ORAL)    Take 1 capsule by mouth every evening.     Objective:     Vitals:    08/12/22 1302   BP: (!) 102/50   Pulse: 76   Temp: 97.2 °F (36.2 °C)     Physical Exam  Vitals reviewed.   Constitutional:       Appearance: Normal appearance.   HENT:      Head: Normocephalic.      Mouth/Throat:      Comments: Wearing mask    Eyes:      Extraocular Movements: Extraocular movements intact.      Pupils: Pupils are equal, round, and reactive to light.      Comments: Glasses     Cardiovascular:      Rate and Rhythm: Normal rate and regular rhythm.      Heart sounds: Normal heart sounds.   Pulmonary:      Effort: Pulmonary effort is normal.      Breath sounds: Normal breath sounds.   Abdominal:      General: Bowel sounds are normal.      Palpations: Abdomen is soft.      Comments: rounded     Genitourinary:     Comments: deferred    Musculoskeletal:         General: Normal range of motion.      Cervical back: Normal range of motion and neck supple.   Skin:     General: Skin is warm and dry.   Neurological:      Mental Status: He is alert and oriented to person, place, and time.   Psychiatric:         Behavior: Behavior normal.         Thought Content: Thought content normal.          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   Ok to proceed with Retacrit today.  Patient to present on Monday for type & screen & 2U PRBCs.   Continue Retacrit every week with CBC.  Follow up in 4 weeks with NEGIN Armas/Dr. Nath/Alejandro with CBC and Comprehensive Metabolic Panel prior to Retacrit.    I will review assessment/plan with collaborating physician Dr. George.      DOMINGO Diaz

## 2022-08-11 NOTE — PROGRESS NOTES
Subjective:   Patient ID:  Jake Ortiz is a 82 y.o. male who presents for cardiac consult of No chief complaint on file.      Follow-up  Pertinent negatives include no chest pain.     The patient came in today for cardiac consult of No chief complaint on file.    Jake Ortiz is a 82 y.o. male pt with CAD s/p CABG, old MI, CHF EF 35% with TR/MR, AVB s/p CRT, PAF on Eliquis, Anemia, HTN, HLD, DM2, NASREEN, CKD4, GERD, Restrictive/obstructive lung disease here for follow up CV care.    4/21/20  Pt seen at [facility] last by CARMELINA Spencer 9/18/19. Overall feels great for the most part. He has been started to gain some water weight in the past, he had been on Entreso by Dr. Menendez, lost 173 lbs. He had paracentesis in past was told due to CHF. His weight started coming back up to 208 lbs and then was gaining weight and he has doubled his meds - Entresto. Otherwise feels well has good energy. He changed here due to insurance.   BP - 108/67, pulse 62, oxygen 98%, weight 209 lbs, blood sugar 127    5/22/20  BNP was neg after last visit, Cr slightly increased with BUN elevated told to take lasix daily and PRN extra. He saw Dr. Holley yesterday, was given Flonase breathing improved.   /120s systolics, 67 diastolic, pulse 60s. . Overall doing will. Needs device clinic.     7/16/20  ECHO with EF 50%, mild to mod MR/TR. Pt had trouble getting Entresto filled due to being in donut hole but patient assistance form filled out.   He has significant back pain. He is euvolemic, no CP/SOB.   ECG - bi V paced    9/24/20 - hosp f/u  He was admitted for lower GI bleed and acute blood loss anemia. Initial H/H of 5.1/16.9.  Patient was transfused 2 units of PRBCs. GI consulted. Will hold anticoagulations. Today H/H 7.4/23.3. Case  discussed with Dr. Farmer, will need  EGD and colonoscopy to evaluate further and given recent anticoagulation to decrease the risk of bleeding from the procedures will plan for the procedures in 5  days. As of 9/20 pt had a bowel movement with red blood yesterday morning, no further episodes reported. This morning H/H 6.7/21.5, will transfuse 2 units of PRBCs. Plans for EGD/colonoscopy on Tuesday. As of 9/21 no active bleeding noted. H/H 9.0/28.0. Continue to monitor. Plan for procedure tomorrow. 09/22: Patient had EGD that showed gastritis which was biopsied. Discussed with GI who recommends dc home today, restart anti coagulation tomorrow, and f/u OP for video capsule study. GI will arrange OP f/u and call patient. New Rx for Pantoprazole 40mg daily sent to pharmacy.   He has restarted taking Eliqis    11/3/20  He has been to ER few times with fatigue and symptomatic anemia, transfused 10/23/20 and then again last Sunday 11/2/20. He cannot get Video capsule study due to pacemaker. He will see Dr. Nath for further workup tx. He has severe back pain, had injection which improved sx will get RFA. Will change Entresto due to Losartan, has trouble affording some meds, ELiquis is also expensive but prefer over coumadin.     2/11/21  He has gotten both COVID vaccines, he is pleased with the process. He has been doing well overall. No CP/SOB. BP and HR well controlled.   He had renal eval and told may need more Losartan.     5/6/21  BP and HR is well controlled.   ECG - V paced, Biv paced    From Device check  saw this pt in clinic in February. He appears to be symptomatic to Kleberg alert for fluid overload. He confirms taking Lasix BID. He reports having 3L of fluid taken off of him a year ago in the hospital. He has AF and is currently undersensing some of the episodes. We will attempt to address this in clinic. He is a CRT-P and his BiV pacing has decreased to 91.8%, which in order to be effective is preferred at 95%. He does not see an EP.    Today he has fatigue and back pain. He has had crawfish a few times last few months.     7/15/21  Last device interrog with AF with freq mode switch and inc Optivol.      Per Dr. Walton-    Last week he was found to have a critical low hemoglobin of 5.8.  Sent to the ER and had 2 units packed red blood cells transfused.  Has had a problem with chronic anemia with multiple transfusions in the past.  Last EGD and colonoscopy were negative.  No small bowel studies were done at that time.  Also followed by hematology for iron infusions.    He will see heme/onc next week.     10/28/21  From pt - I have some shortness of breath, but I have been under the weather. I've seen Dr. Walton last Friday. Feeling bloated, took a potassium tablet last night. Very little coughing today, starting to dry up. See you tomorrow.  His feet have been getting too tight in shoes.     11/11/21  He went to a recent party where he was very active and feeling well now. He had recent URI sx imporve. Weight 205-207 lbs. BP and HR stable.   ECG - V paced, Bi V paced    Hospital Course:   Patient was kept on OBS for symptomatic anemia under the care of hospital medicine.  12/23: Hgb increased to 7.4 overnight with transfusion of 2u pRBC. Pt is still very tired. Troponin increased to 0.118, pt mukesh CP, palpitations, but admits to SOB with exertion. cardiology consulted. GI feels like no indication for intervention, will continue OP f/u for VCE that is scheduled for next week, they have signed off. Heme/onc following - will start IV iron and continue OP f/u as scheduled.  12/24/21 Hemoglobin stable. Will have VCE completed outpatient per GI. Okay to resume Eliquis 2.5mg daily BID. He was asked to hold Plavix and start ASA for CAD until after GI test.     12/30/21  BP 140s/70s. HR 80s. Pt had recent admission for anemia with elevated trop, s/p PRBC, is on Eliquis 2.5 BID, DAPT on hold. He is taking only half tab Eliquis BID.     Component Ref Range & Units 3 wk ago   (12/30/21) 1 mo ago   (12/17/21) 2 mo ago   (10/28/21) 8 mo ago   (5/11/21) 1 yr ago   (4/27/20) 3 yr ago   (7/31/18) 3 yr ago   (3/5/18)   BNP 0 - 99  pg/mL 471 High   222 High  CM  282 High  CM            1/20/22  BP and HR stable today. BNP was elevated told pt - labs reveal elevated BNP due to extra fluid, need to double up lasix to 40mg twice a day until breathing is improving and have less swelling/weight, continue monitoring bP and weights daily and low salt diet, follow up with me in 2-3 weeks will adjust meds further. Kidney function, potassium and magnesium are stable.   He still has more back pain, sees Dr. Gamez. His breathing is better and energy is improved. He is taking iron tabs. His video capsule was neg. No further bleeding issues.   219 lbs --> 206 lbs today     2/10/22  Pt had eval with Dr. Price will be a candidate for Watchman. Labs from last visit - labs reveal low potassium and magnesium, start taking potassium 20 meq and magnesium 400mg daily. BNP has improved.   BP and HR stable now. He wants to get Watchman in [place] possible. Breathing has improved, weight is coming down.     4/28/22  BP and HR stable. He has been gaining more weight lately, about 6 lbs, he has more KING as well. His device interrogation .  OptiVol WNL, possible fluid build up Feb 7 - May 22nd.  Pt has congestive heart failure and cardiomegaly.  Well-functioning device.     8/11/22  Pt had CRT-P Gen change 6/2022 due to battery depletion . He had recent eval with Dr. Moody regarding banding hemorrhoids if anemia/bleeding is worse but may need EGD/Cscope with GI in the interim. PT remains on Eliquis 2.5 mg BID, not on ASA now. He has significant back pain can't walk as much, he is seeing Dr. Demarco.   BP low normal, HR 96.     Patient feels no chest pain,  no PND, no palpitation, no dizziness, no syncope, no CNS symptoms.    Patient has dec exercise tolerance.    Patient is compliant with medications.    CRT interrogation 6/28/22  Device fxn WNL  RA pacing n/a%, BiV pacing 100%  Battery Status/Longevity: 6.8 years, 3.20V (RRT 2.60V)  Atrial arrhythmias: chronic AF  Ventricular  arrhythmias: none  Anticoagulant: Eliquis  CHF: OptiVol WNL. Pt has congestive heart failure and cardiomegaly.     Reprogramming at this visit: none        Results for orders placed during the hospital encounter of 04/28/22    Echo    Interpretation Summary  · The left ventricle is normal in size with concentric hypertrophy and moderately decreased systolic function.  · The estimated ejection fraction is 35%.  · Grade III left ventricular diastolic dysfunction.  · Mild right ventricular enlargement with moderately reduced right ventricular systolic function.  · Mild left atrial enlargement.  · Mild right atrial enlargement.  · There is mild aortic valve stenosis.  · Aortic valve area is 1.53 cm2; peak velocity is 1.78 m/s; mean gradient is 7 mmHg.  · Mild mitral regurgitation.  · Mild tricuspid regurgitation.  · Elevated central venous pressure (15 mmHg).  · The estimated PA systolic pressure is 37 mmHg.  · There is abnormal septal wall motion. There is systolic and diastolic flattening of the interventricular septum consistent with right ventricle pressure and volume overload.  · There is moderate left ventricular global hypokinesis.      Past Medical History:   Diagnosis Date    Abnormal PFT     Anemia     Arthritis     Atrial fibrillation 10/19/2017    Back pain     Congestive heart failure 03/05/2018    Coronary artery disease     DM (diabetes mellitus) 2018     am 06/23/2020    DM (diabetes mellitus) 2016     am 08/17/2021    Hyperlipemia     Hypertension     Myocardial infarction     NASREEN (obstructive sleep apnea) 06/05/2018    Prostate cancer 2015    Tobacco dependence     Type 2 diabetes mellitus        Past Surgical History:   Procedure Laterality Date    COLONOSCOPY N/A 9/22/2020    Procedure: COLONOSCOPY;  Surgeon: Javier Farmer MD;  Location: [facility];  Service: Endoscopy;  Laterality: N/A;    CORONARY ARTERY BYPASS GRAFT  1987    EPIDURAL STEROID INJECTION N/A  11/22/2019    Procedure: Lumbar L5/S1 IL XUAN;  Surgeon: Tacho Trivedi MD;  [facility];  Service: Pain Management;  Laterality: N/A;    EPIDURAL STEROID INJECTION N/A 1/6/2020    Procedure: Lumbar L5/S1 IL XUAN;  Surgeon: Tacho Trivedi MD;  Location: [facility];  Service: Pain Management;  Laterality: N/A;    EPIDURAL STEROID INJECTION N/A 2/10/2020    Procedure: Caudal XUAN;  Surgeon: Tacho Trivedi MD;  [facility];  Service: Pain Management;  Laterality: N/A;    ESOPHAGOGASTRODUODENOSCOPY N/A 9/22/2020    Procedure: EGD (ESOPHAGOGASTRODUODENOSCOPY);  Surgeon: Javier Farmer MD;  Location: [facility] ENDO;  Service: Endoscopy;  Laterality: N/A;    INJECTION OF ANESTHETIC AGENT AROUND MEDIAL BRANCH NERVES INNERVATING LUMBAR FACET JOINT Bilateral 10/12/2020    Procedure: Bilateral L3-5 MBB;  Surgeon: Tacho Trivedi MD;  Location: [facility];  Service: Pain Management;  Laterality: Bilateral;    INJECTION OF ANESTHETIC AGENT AROUND MEDIAL BRANCH NERVES INNERVATING LUMBAR FACET JOINT Bilateral 12/21/2020    Procedure: Bilateral L3-5 MBB with steroid;  Surgeon: Tacho Trivedi MD;  Location: [facility]GT;  Service: Pain Management;  Laterality: Bilateral;    INTRALUMINAL GASTROINTESTINAL TRACT IMAGING VIA CAPSULE N/A 12/29/2021    Procedure: IMAGING PROCEDURE, GI TRACT, INTRALUMINAL, VIA CAPSULE;  Surgeon: Tahir Geronimo RN;  Location: [facility] ENDO;  Service: Endoscopy;  Laterality: N/A;    PROSTATE SURGERY      prostate radiation    RADIOFREQUENCY THERMOCOAGULATION Left 6/4/2021    Procedure: Left L3-5 Lumbar RFA;  Surgeon: Tacho Trivedi MD;  Location: [facility];  Service: Pain Management;  Laterality: Left;    RADIOFREQUENCY THERMOCOAGULATION Right 6/18/2021    Procedure: Right L3-5 Lumbar RFA;  Surgeon: Tacho Trivedi MD;  Location: [facility]GT;  Service: Pain Management;  Laterality: Right;    REPLACEMENT OF PACEMAKER GENERATOR Left 6/16/2022     Procedure: REPLACEMENT, PULSE GENERATOR, CARDIAC PACEMAKER/CRT-P device;  Surgeon: Darrel Carrero MD;  Location: [facility] CATH LAB;  Service: Cardiology;  Laterality: Left;  NOT MRI safe   Pacer and leads implanted 2017, Dr. Souleymane CARROLLT Consulta CRT-P C4TR01, [id number]   A lead: Mdt 5076 CapSureFix® Novus, [id number]   RV lead: Mdt 5076 CapSureFix® Novus, [id number]   LV lead: Mdt 4196 At    SELECTIVE INJECTION OF ANESTHETIC AGENT AROUND LUMBAR SPINAL NERVE ROOT BY TRANSFORAMINAL APPROACH Bilateral 2022    Procedure: Bilateral L3/4 TF XUAN with RN IV sedation;  Surgeon: Philipp Demarco MD;  Location: [facility];  Service: Pain Management;  Laterality: Bilateral;    SPINE SURGERY      fusion    TONSILLECTOMY         Social History     Tobacco Use    Smoking status: Former Smoker     Packs/day: 1.50     Years: 20.00     Pack years: 30.00     Types: Cigarettes     Start date:      Quit date:      Years since quittin.6    Smokeless tobacco: Never Used   Substance Use Topics    Alcohol use: No    Drug use: No       Family History   Problem Relation Age of Onset    Heart attack Mother     Diabetes Mother     Heart disease Mother     Cataracts Mother     Stroke Father     Heart disease Father     Heart disease Brother        Patient's Medications   New Prescriptions    No medications on file   Previous Medications    ACETAMINOPHEN (TYLENOL) 500 MG TABLET    Take 2 tablets (1,000 mg total) by mouth every 6 (six) hours as needed for Pain.    APIXABAN (ELIQUIS) 2.5 MG TAB    Take 1 tablet (2.5 mg total) by mouth 2 (two) times daily.    ASPIRIN 81 MG CHEW    Take 1 tablet (81 mg total) by mouth once daily.    ATORVASTATIN (LIPITOR) 80 MG TABLET    One tablet daily    BLOOD SUGAR DIAGNOSTIC STRP    Check blood glucose levels daily in the AM fasting and 1-2 times more daily.    CEFADROXIL (DURICEF) 500 MG CAP    Take 1 capsule (500 mg total) by mouth every 12 (twelve) hours.     CHOLECALCIFEROL, VITAMIN D3, (VITAMIN D3) 25 MCG (1,000 UNIT) CAPSULE    Take 1,000 Units by mouth once daily.    CLONAZEPAM (KLONOPIN) 1 MG TABLET    Take 1 tablet (1 mg total) by mouth nightly as needed for Anxiety.    FLUTICASONE PROPIONATE (FLONASE) 50 MCG/ACTUATION NASAL SPRAY    USE 2 SPRAYS IN EACH NOSTRIL EVERY DAY    FUROSEMIDE (LASIX) 40 MG TABLET    Take 1 tablet (40 mg total) by mouth 2 (two) times daily. Can double to 2 tablets twice a day for 3 days for more swelling/fluid build up - take 80mg twice a day    KRILL/OM-3/DHA/EPA/PHOSPHO/AST (MEGARED OMEGA-3 KRILL OIL ORAL)    Take 1 capsule by mouth every morning.    LANCETS MISC    Check blood glucose levels daily in the AM fasting and 1-2 times more daily.    LEVOCETIRIZINE (XYZAL) 5 MG TABLET    TAKE 1 TABLET EVERY EVENING    LOSARTAN (COZAAR) 50 MG TABLET    Take 1 tablet (50 mg total) by mouth once daily.    MAGNESIUM OXIDE (MAG-OX) 400 MG (241.3 MG MAGNESIUM) TABLET    Take 1 tablet (400 mg total) by mouth once daily.    MULTIVITAMIN CAPSULE    Take 1 capsule by mouth once daily.    PANTOPRAZOLE (PROTONIX) 40 MG TABLET    Take 1 tablet (40 mg total) by mouth once daily.    POTASSIUM CHLORIDE SA (K-DUR,KLOR-CON) 20 MEQ TABLET    Take 1 tablet (20 mEq total) by mouth once daily.    SPIRONOLACTONE (ALDACTONE) 25 MG TABLET    Take 1 tablet (25 mg total) by mouth once daily.    VIT A/VIT C/VIT E/ZINC/COPPER (OCUVITE PRESERVISION ORAL)    Take 1 capsule by mouth every evening.   Modified Medications    No medications on file   Discontinued Medications    No medications on file       Review of Systems   Constitutional: Positive for malaise/fatigue.   HENT: Negative.    Eyes: Negative.    Respiratory: Positive for shortness of breath.    Cardiovascular: Positive for leg swelling. Negative for chest pain.   Gastrointestinal: Negative.    Genitourinary: Negative.    Musculoskeletal: Negative.    Skin: Negative.    Neurological: Negative.   "  Endo/Heme/Allergies: Negative.    Psychiatric/Behavioral: Negative.    All 12 systems otherwise negative.      Wt Readings from Last 3 Encounters:   08/11/22 88.5 kg (195 lb 1.7 oz)   08/08/22 87.3 kg (192 lb 6.4 oz)   07/14/22 84.9 kg (187 lb 2.7 oz)     Temp Readings from Last 3 Encounters:   08/05/22 97.4 °F (36.3 °C)   07/29/22 97 °F (36.1 °C)   07/22/22 97.2 °F (36.2 °C)     BP Readings from Last 3 Encounters:   08/11/22 (!) 95/52   08/08/22 (!) 95/53   08/05/22 (!) 94/54     Pulse Readings from Last 3 Encounters:   08/11/22 96   08/08/22 84   08/05/22 76       BP (!) 95/52   Pulse 96   Ht 5' 9.5" (1.765 m)   Wt 88.5 kg (195 lb 1.7 oz)   SpO2 99%   BMI 28.40 kg/m²     Objective:   Physical Exam  Vitals and nursing note reviewed.   Constitutional:       General: He is not in acute distress.     Appearance: He is well-developed. He is not diaphoretic.   HENT:      Head: Normocephalic and atraumatic.      Nose: Nose normal.   Eyes:      General: No scleral icterus.        Right eye: No discharge.         Left eye: No discharge.      Conjunctiva/sclera: Conjunctivae normal.   Neck:      Thyroid: No thyromegaly.      Vascular: No JVD.   Cardiovascular:      Rate and Rhythm: Normal rate and regular rhythm.      Heart sounds: S1 normal and S2 normal. Murmur heard.     No friction rub. No gallop. No S3 or S4 sounds.   Pulmonary:      Effort: Pulmonary effort is normal. No respiratory distress.      Breath sounds: Normal breath sounds. No stridor. No wheezing or rales.   Chest:      Chest wall: No tenderness.   Abdominal:      General: Bowel sounds are normal. There is no distension.      Palpations: Abdomen is soft. There is no mass.      Tenderness: There is no abdominal tenderness. There is no rebound.   Genitourinary:     Comments: Deferred  Musculoskeletal:         General: No tenderness or deformity. Normal range of motion.      Cervical back: Normal range of motion and neck supple.   Lymphadenopathy:      " Cervical: No cervical adenopathy.   Skin:     General: Skin is warm and dry.      Coloration: Skin is not pale.      Findings: No erythema or rash.   Neurological:      Mental Status: He is alert and oriented to person, place, and time.      Motor: No abnormal muscle tone.      Coordination: Coordination normal.   Psychiatric:         Behavior: Behavior normal.         Thought Content: Thought content normal.         Judgment: Judgment normal.         Lab Results   Component Value Date     07/14/2022    K 4.7 07/14/2022     07/14/2022    CO2 21 (L) 07/14/2022    BUN 30 (H) 07/14/2022    CREATININE 1.5 (H) 07/14/2022    GLU 90 07/14/2022    HGBA1C 4.9 04/29/2022    MG 1.8 04/30/2022    AST 15 07/14/2022    ALT 7 (L) 07/14/2022    ALBUMIN 4.0 07/14/2022    PROT 7.5 07/14/2022    BILITOT 0.4 07/14/2022    WBC 5.08 07/14/2022    HGB 7.8 (L) 07/14/2022    HCT 27.6 (L) 07/14/2022    MCV 92 07/14/2022     07/14/2022    INR 1.1 06/16/2022    TSH 1.268 11/04/2020    CHOL 124 07/14/2022    HDL 43 07/14/2022    LDLCALC 54.4 (L) 07/14/2022    TRIG 133 07/14/2022     (H) 05/11/2022     Assessment:      1. Coronary artery disease of native artery of native heart with stable angina pectoris    2. Presence of cardiac resynchronization therapy pacemaker (CRT-P)    3. Longstanding persistent atrial fibrillation    4. Stage 4 chronic kidney disease    5. Presence of cardiac pacemaker    6. Iron deficiency anemia due to chronic blood loss    7. Obstructive sleep apnea    8. Mixed restrictive and obstructive lung disease    9. Paroxysmal atrial fibrillation    10. Chronic combined systolic and diastolic congestive heart failure    11. Cardiac pacemaker in situ    12. Primary hypertension    13. S/P CABG x 3        Plan:   1. CAD s/p CABG x3, old MI  - cont meds - DAPT on hold due to anemia   - elevated trop sec to demand ischemia   - cont Eliquis    2. CHF EF 35%, with TR/MR, improved EF ;  --> 282 ->  222 --> 471 --> 230  --> 507 --> 276  Weight; 219 lbs --> 206  --> 200 lbs  --> 195 lbs  - cont meds lasix 40 BID ; with K/Mg   - needs to take 80mg BID PRN edema  - cont to monitor valve disease  - changed Entresto to Losartan   - cont Aldactone 25mg    3. Chronic Afib   - cont meds - Eliquis   - f/u Dr. Price for Watchman device - pending/on hold     [id number] score: 5  HAS-BLED score: 5    If you answer NO to any of the four criteria below, the patient does not meet the WATCHMAN implant eligibility requirements.     1. Patient has non-valvular atrial fibrillation: Yes  2. Patient has an increased risk for stroke and is recommended for oral anticoagulation (OAC): Yes  3. Patient is suitable for short-term anticoagulation therapy but deemed unable to take long-term OAC: Yes  Patient has an appropriate rationale to seek a non-pharmacological alternative to OACs. Specific factors include: History of bleeding or increased bleeding risk,    CHADSVASC - 5  HASLBED score - 5    4. NASREEN  - cont CPAP    5. Restricive/obstrucive lung disease  - cont tx per PCP/pulm    6. DM2 6.0  --> 6.1 --> 6.3 --> 5.1 --> 4.9  - cont tx per PCP    7. AVB s/p ICD - s/p CRT-P gen change 6/2022  - cont to monitor  - f/u device clinic and EP    8. Anemia sec to GIB, CKD  - f/u with GI and heme/onc; s/p video capsule - neg in past  - s/p PRBC, is on Eliquis 2.5 BID, DAPT on hold.   - cont iron tabs and IV iron infusion   - f/u colorectal sx - may need hemorrhoidal banding sx    9. CKD  - cont to monitor   - f/u nephro     Thank you for allowing me to participate in this patient's care. Please do not hesitate to contact me with any questions or concerns. Consult note has been forwarded to the referral physician.

## 2022-08-14 NOTE — PLAN OF CARE
Problem: Adult Inpatient Plan of Care  Goal: Plan of Care Review  Outcome: Ongoing, Progressing  Flowsheets (Taken 4/7/2022 1236)  Plan of Care Reviewed With: patient  Goal: Patient-Specific Goal (Individualized)  Outcome: Ongoing, Progressing  Flowsheets (Taken 4/7/2022 1236)  Anxieties, Fears or Concerns: none  Individualized Care Needs: Pillow, recline.  Patient-Specific Goals (Include Timeframe): Patien will tolerate injectafer infusion well  Goal: Optimal Comfort and Wellbeing  Outcome: Ongoing, Progressing     Problem: Anemia  Goal: Anemia Symptom Improvement  Outcome: Ongoing, Progressing  Intervention: Monitor and Manage Anemia  Flowsheets (Taken 4/7/2022 1236)  Fatigue Management:   paced activity encouraged   frequent rest breaks encouraged      Date of service: 8/13/2022.    Status of the evaluation: inpatient followup    Chief complaint and reason for consultation: right digital ischemia    Referring physician: Nash STAUFFER independently evaluated Zakiya Pak 

## 2022-08-15 NOTE — PLAN OF CARE
Problem: Adult Inpatient Plan of Care  Goal: Plan of Care Review  Outcome: Ongoing, Progressing  Flowsheets (Taken 8/15/2022 0948)  Plan of Care Reviewed With: patient  Goal: Patient-Specific Goal (Individualized)  Outcome: Ongoing, Progressing  Flowsheets (Taken 8/15/2022 0948)  Anxieties, Fears or Concerns: patient voices no concerns at this time  Individualized Care Needs: Pillow and blanket offered for comfort measures  Patient-Specific Goals (Include Timeframe): tolerate treatment without adverse reaction  Goal: Optimal Comfort and Wellbeing  Outcome: Ongoing, Progressing  Intervention: Provide Person-Centered Care  Flowsheets (Taken 8/15/2022 0948)  Trust Relationship/Rapport:   care explained   reassurance provided   choices provided   thoughts/feelings acknowledged   emotional support provided   questions encouraged   questions answered   empathic listening provided

## 2022-08-15 NOTE — DISCHARGE INSTRUCTIONS
[facility]  [address] [facility]  [address]  [phone] phone     [phone] fax  Hours of Operation: Monday- Friday 8:00am- 5:00pm  After hours phone  [phone]  Hematology / Oncology Physicians on call      DANDRE Nunn Dr., Dr., NEGIN Ying, NEGIN Manzo, DOMINGO Galaviz    Please call with any concerns regarding your appointment today.      FALL PREVENTION   Falls often occur due to slipping, tripping or losing your balance. Here are ways to reduce your risk of falling again.   Was there anything that caused your fall that can be fixed, removed or replaced?   Make your home safe by keeping walkways clear of objects you may trip over.   Use non-slip pads under rugs.   Do not walk in poorly lit areas.   Do not stand on chairs or wobbly ladders.   Use caution when reaching overhead or looking upward. This position can cause a loss of balance.   Be sure your shoes fit properly, have non-slip bottoms and are in good condition.   Be cautious when going up and down stairs, curbs, and when walking on uneven sidewalks.   If your balance is poor, consider using a cane or walker.   If your fall was related to alcohol use, stop or limit alcohol intake.   If your fall was related to use of sleeping medicines, talk to your doctor about this. You may need to reduce your dosage at bedtime if you awaken during the night to go to the bathroom.   To reduce the need for nighttime bathroom trips:   Avoid drinking fluids for several hours before going to bed   Empty your bladder before going to bed   Men can keep a urinal at the bedside   © 6281-6327 [facility] [facility], [address] [place] [address], [place] [address]. All rights reserved. This information is not intended as a substitute for professional medical care. Always follow your healthcare professional's instructions.

## 2022-08-20 NOTE — PROGRESS NOTES
Psychiatry Initial Visit (PhD/LCSW)  Diagnostic Interview - CPT 83157    Date: 8/16/2022    Site: [place]    Referral source: PHILIP Blackman PA-C    Clinical status of patient: Outpatient    Jake Ortiz, a 82 y.o. male, for initial evaluation visit.  Met with patient.    Chief complaint/reason for encounter: Psychological Evaluation prior to surgical implantation of a spinal cord stimulator    PHQ-9 Depression Patient Health Questionnaire 8/16/2022   Patient agreed to terms: Yes   Little interest or pleasure in doing things 3   Feeling down, depressed, or hopeless 3   Trouble falling or staying asleep, or sleeping too much 1   Feeling tired or having little energy 1   Poor appetite or overeating 0   Feeling bad about yourself - or that you are a failure or have let yourself or your family down 0   Trouble concentrating on things, such as reading the newspaper or watching television 0   Moving or speaking so slowly that other people could have noticed. Or the opposite - being so fidgety or restless that you have been moving around a lot more than usual 0   Thoughts that you would be better off dead, or of hurting yourself in some way 0   PHQ-9 Total Score 8   If you checked off any problems, how difficult have these problems made it for you to do your work, take care of things at home, or get along with other people? Not difficult at all   Interpretation Mild       No flowsheet data found.    History of present illness:  Met with this patient for a psychological evaluation prior to a spinal cord stimulator.  The patient, age 82, states that he has been experiencing significant back pain for the past several years.  He states that at this time back pain rules my life.  He states that he has been  to his wife Kendal for 48 years.  He has 1 jonas Jer age 57 but no grandchildren. He states that he has always been very active and that he used to walk 7 miles a day only 6-7 years ago. Even now, with  the back pain, he is active, has many hobbies such as photography, travel and fixing watches and he is very active with the [place] [employer]. He goes there daily and enjoys making other people happy.  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  He has a very outgoing, friendly, and infectious personality.  He states that he has had some back surgeries and suffers from arthritis.  He states that he experiences no depression or anxiety although sometimes he has some trouble sleeping.  He showed not signs of suicidal, homicidal ideation or psychosis. He understands that the spinal cord stimulator is a permanent implanted device and described his understanding of how it is used.  He states that he understands the procedure.  The patient shows good character strength and states that God gives him blessings.  This patient is a good candidate it for the spinal cord stimulator.    Pain: 8    Symptoms:   · Mood: poor concentration  · Anxiety: restlessness/keyed up  · Substance abuse: denied  · Cognitive functioning: denied  · Health behaviors: noncontributory    Psychiatric history: psychotropic management by PCP    Medical history:   Past Medical History:   Diagnosis Date    Abnormal PFT     Anemia     Arthritis     Atrial fibrillation 10/19/2017    Back pain     Congestive heart failure 03/05/2018    Coronary artery disease     DM (diabetes mellitus) 2018     am 06/23/2020    DM (diabetes mellitus) 2016     am 08/17/2021    Hyperlipemia     Hypertension     Myocardial infarction     NASREEN (obstructive sleep apnea) 06/05/2018    Prostate cancer 2015    Tobacco dependence     Type 2 diabetes mellitus        Family history of psychiatric illness:   Family History   Problem Relation Age of Onset    Heart attack Mother     Diabetes Mother     Heart disease Mother     Cataracts Mother     Stroke Father     Heart disease Father     Heart disease Brother          Social history (marriage, employment, etc.):   Social History     Social History Narrative    Not on file        Substance use:     Social History     Tobacco Use    Smoking status: Former Smoker     Packs/day: 1.50     Years: 20.00     Pack years: 30.00     Types: Cigarettes     Start date:      Quit date:      Years since quittin.6    Smokeless tobacco: Never Used   Substance Use Topics    Alcohol use: No        Current medications and drug reactions (include OTC, herbal):    Current Outpatient Medications:     acetaminophen (TYLENOL) 500 MG tablet, Take 2 tablets (1,000 mg total) by mouth every 6 (six) hours as needed for Pain., Disp: , Rfl: 0    apixaban (ELIQUIS) 2.5 mg Tab, Take 1 tablet (2.5 mg total) by mouth 2 (two) times daily., Disp: 180 tablet, Rfl: 1    atorvastatin (LIPITOR) 80 MG tablet, Take 1 tablet (80 mg total) by mouth every evening., Disp: 90 tablet, Rfl: 1    blood sugar diagnostic Strp, Check blood glucose levels daily in the AM fasting and 1-2 times more daily., Disp: 300 strip, Rfl: 3    cefadroxil (DURICEF) 500 MG Cap, Take 1 capsule (500 mg total) by mouth every 12 (twelve) hours., Disp: 6 capsule, Rfl: 0    cholecalciferol, vitamin D3, (VITAMIN D3) 25 mcg (1,000 unit) capsule, Take 1,000 Units by mouth once daily., Disp: , Rfl:     clonazePAM (KLONOPIN) 1 MG tablet, Take 1 tablet (1 mg total) by mouth nightly as needed for Anxiety., Disp: 90 tablet, Rfl: 0    fluticasone propionate (FLONASE) 50 mcg/actuation nasal spray, USE 2 SPRAYS IN EACH NOSTRIL EVERY DAY, Disp: 48 g, Rfl: 5    furosemide (LASIX) 40 MG tablet, Take 1 tablet (40 mg total) by mouth 2 (two) times daily. Can double to 2 tablets twice a day for 3 days for more swelling/fluid build up - take 80mg twice a day, Disp: 90 tablet, Rfl: 1    krill/om-3/dha/epa/phospho/ast (MEGARED OMEGA-3 KRILL OIL ORAL), Take 1 capsule by mouth every morning., Disp: , Rfl:     lancets Misc, Check blood glucose  levels daily in the AM fasting and 1-2 times more daily., Disp: 300 each, Rfl: 3    levocetirizine (XYZAL) 5 MG tablet, TAKE 1 TABLET EVERY EVENING, Disp: 90 tablet, Rfl: 1    losartan (COZAAR) 50 MG tablet, Take 1 tablet (50 mg total) by mouth once daily., Disp: 90 tablet, Rfl: 3    magnesium oxide (MAG-OX) 400 mg (241.3 mg magnesium) tablet, Take 1 tablet (400 mg total) by mouth once daily., Disp: 90 tablet, Rfl: 1    multivitamin capsule, Take 1 capsule by mouth once daily., Disp: , Rfl:     pantoprazole (PROTONIX) 40 MG tablet, Take 1 tablet (40 mg total) by mouth once daily., Disp: 90 tablet, Rfl: 3    potassium chloride SA (K-DUR,KLOR-CON) 20 MEQ tablet, Take 1 tablet (20 mEq total) by mouth once daily., Disp: 90 tablet, Rfl: 1    spironolactone (ALDACTONE) 25 MG tablet, Take 1 tablet (25 mg total) by mouth once daily., Disp: 90 tablet, Rfl: 1    vit A/vit C/vit E/zinc/copper (OCUVITE PRESERVISION ORAL), Take 1 capsule by mouth every evening., Disp: , Rfl:   No current facility-administered medications for this visit.    Facility-Administered Medications Ordered in Other Visits:     ondansetron injection 4 mg, 4 mg, Intravenous, Once PRN, Tacho Trivedi MD      Strengths and liabilities: Strength: Patient accepts guidance/feedback, Strength: Patient is expressive/articulate., Strength: Patient is motivated for change., Strength: Patient is stable.    Current Evaluation:     Mental Status Exam:  General Appearance:  unremarkable, age appropriate   Speech: normal tone, normal rate, normal pitch, normal volume      Level of Cooperation: cooperative      Thought Processes: normal and logical   Mood: steady      Thought Content: normal, no suicidality, no homicidality, delusions, or paranoia   Affect: congruent and appropriate   Orientation: Oriented x3   Memory: recent >  intact   Attention Span & Concentration: intact   Fund of General Knowledge: intact and appropriate to age and level of education    Abstract Reasoning: interpretation of similarities was abstract   Judgment & Insight: good     Language  intact     Diagnostic Impression - Plan:       ICD-10-CM ICD-9-CM   1. Encounter for psychological evaluation  Z00.8 V72.85   2. Dorsalgia, unspecified  M54.9 724.5       Plan:This patient displays no evidence of suicidal, homicidal ideation or psychosis. He communicates understanding of the purpose and function of the procedure. He is a good candidate for the spinal cord stimunlator.    Return to Clinic: as needed    Length of Service (minutes): 45       Rosa Beasley LCSW  08/22/2022   11:09 PM

## 2022-08-23 NOTE — TELEPHONE ENCOUNTER
----- Message from Gaye Ramesh PA-C sent at 8/19/2022  7:54 AM CDT -----  Ok, please document this.    ----- Message -----  From: Carly Crabtree MA  Sent: 8/18/2022   3:45 PM CDT  To: Gaye Ramesh PA-C    Patient states that he is not interested in moving forward with repeat scopes. He stated that he appreciates everything that we have done but he does not want to keep having repeat procedures because the cause of the issue is never found. Patient further stated that as long as his hemoglobin stays at 7 or above he feels fine.  ----- Message -----  From: Gaye Ramesh PA-C  Sent: 8/10/2022   9:24 AM CDT  To: Domitila Huizar Staff    Patient with significant history of anemia. HE has had scopes and VCE with no real findings of cause of anemia. Recently this has progressed and he had + occult blood. I referred him to Dr. Moody to evaluate his hemorrhoids. Dr. Moody did full exam and does not think this is the cause. He is recommending repeat endoscopy with EGD and colonoscopy. Please let patient know and see if he is ok with moving forward with repeat scopes. I am happy to discuss with him if he would like a visit. Can be virtual.    Let me know if he is ok to move forward and I will place an order to PAT.    Thanks,  Gaye

## 2022-08-26 NOTE — NURSING
Patient arrived for retacrit injection. Patient voices no concerns at this time. Labs reviewed. Vital signs and assessment documented. Retacrit 40k units administered SQ in left upper arm as documented on MAR using aseptic technique. Patient tolerated well. Band aid applied to site. Patient declined to stay for observation and denies adverse reaction with previous injection. Patient verbally confirmed next appt and ambulated self out of treatment room.

## 2022-08-26 NOTE — DISCHARGE INSTRUCTIONS
[facility]  [address] [facility]  [address]  [phone] phone     [phone] fax  Hours of Operation: Monday- Friday 8:00am- 5:00pm  After hours phone  [phone]  Hematology / Oncology Physicians on call      DANDRE Nunn Dr., Dr., NP Sydney Prescott, NEGIN Salgado FNP    Please call with any concerns regarding your appointment today.

## 2022-09-02 NOTE — DISCHARGE INSTRUCTIONS
[facility]  [address] [address]  [address]  [phone] phone     [phone] fax  Hours of Operation: Monday- Friday 8:00am- 5:00pm  After hours phone  [phone]  Hematology / Oncology Physicians on call      DANDRE Nunn Dr., Dr., NP Sydney Prescott, NEGIN Salgado FNP    Please call with any concerns regarding your appointment today.

## 2022-09-02 NOTE — NURSING
Patient arrived for retacrit injection. Patient voices no concerns at this time. Labs reviewed. Vital signs and assessment documented. Retacrit 40k units administered SQ in left upper arm as documented on MAR using aseptic technique. Patient tolerated well. Band aid applied to site. Patient declined to stay for observation. Patient verbally confirmed next appt and ambulated self out of treatment room.

## 2022-09-06 NOTE — TELEPHONE ENCOUNTER
----- Message from Phillip Hood MD sent at 9/6/2022  1:22 PM CDT -----  Regarding: RE: Clearance  HI     I do not recall talking to him about having the Watchman here in [place], it was put on hold for awhile so told him to follow up with Dr. Price in [place] and then decide, I am not sure who implants them locally but he can establish with cardiology outside of [facility] and then go from there but may take a while longer.     To my staff, please schedule a visit with me in a few weeks will discuss further and make appropriate referrals. Thanks     - PM      ----- Message -----  From: Philipp Demarco MD  Sent: 9/6/2022   1:01 PM CDT  To: Phillip Hood MD, Eliot Price MD, #  Subject: RE: Clearance                                    The patient told me he was interested in being referred by you, Dr. Hood, to [facility] for the Watchman procedure as he did not want to travel to [place] secondary to driving impairment.  Is that is still the plan?  If so, I can schedule the trial after the Watchman procedure and appropriate timeline of aspirin and Plavix continuation.    Thank You!    Philipp      ----- Message -----  From: Phillip Hood MD  Sent: 9/6/2022  12:46 PM CDT  To: Eliot Price MD, Philipp Demarco MD, #  Subject: RE: Clearance                                    HI    Yes he can go ahead with the spinal cord stim trial, can hold Eliquis 3 days prior but after Watchman he will be on aspirin/plavix which he cannot stop right away; I am not sure when that is scheduled for.     He is at moderately elevated CV risk for procedure due to his co morbidities but can proceed if not having any acute CHF symptoms. Thanks,    - PM  ----- Message -----  From: Corey Barroso MA  Sent: 9/6/2022  12:34 PM CDT  To: Phillip Hood MD  Subject: Clearance                                        Dr. Hood:    Jake BRUNER Diana was seen in office with complaints of severe low back pain. Dr. Demarco would like to  perform spinal cord stimulator trial, and Jake Ortiz would like to proceed. Dr. Demarco is requesting for clearance to hold apixaban (Eliquis) 3 days prior to procedure. Patient Jake Ortiz can resume medication following the procedure. Also secondary to his history of Afib, heart failure with pacemaker, CAD and upcoming evaluation for a Watchman, she was requesting your evaluation for cardiac risk for the trial and potential implant and any recommendations.    Please advise so we may proceed.     Corey

## 2022-09-06 NOTE — TELEPHONE ENCOUNTER
----- Message from Phillip Hood MD sent at 9/6/2022 12:43 PM CDT -----  Regarding: RE: Clearance  HI    Yes he can go ahead with the spinal cord stim trial, can hold Eliquis 3 days prior but after Watchman he will be on aspirin/plavix which he cannot stop right away; I am not sure when that is scheduled for.     He is at moderately elevated CV risk for procedure due to his co morbidities but can proceed if not having any acute CHF symptoms. Thanks,    - PM  ----- Message -----  From: Corey Barroso MA  Sent: 9/6/2022  12:34 PM CDT  To: Phillip Hood MD  Subject: Clearance                                        Dr. Hood:    Jake Ortiz was seen in office with complaints of severe low back pain. Dr. Demarco would like to perform spinal cord stimulator trial, and Jake Ortiz would like to proceed. Dr. Demarco is requesting for clearance to hold apixaban (Eliquis) 3 days prior to procedure. Patient Jake Ortiz can resume medication following the procedure. Also secondary to his history of Afib, heart failure with pacemaker, CAD and upcoming evaluation for a Watchman, she was requesting your evaluation for cardiac risk for the trial and potential implant and any recommendations.    Please advise so we may proceed.     Corey

## 2022-09-06 NOTE — PROGRESS NOTES
Established Patient Chronic Pain Note     Referring Physician: No ref. provider found    PCP: Tristin Nath MD    Chief Complaint:   LBP     SUBJECTIVE:  Interval history 09/06/2022  Patient presents for CT thoracic spine review and for discussion of spinal cord stimulation.  Today patient again reports lower back pain which radiates down the anterior aspects of bilateral lower extremities in L3-4 to the calf.  Patient does report associated weakness in the lower extremities associated with his pain.  Patient is interested in pursuing spinal cord stimulation.    To review, patient has completed conventional physical therapy at [facility] physical therapy:  August 2018.  Since that time patient has continued physician directed physical therapy exercises at home daily without any improvement in pain or functionality..  Furthermore patient, patient has failed to have improvement in pain with anti-inflammatory medications Tylenol and was unable to tolerate membrane stabilizing agents secondary to side effects.  Patient has had numerous procedures including lumbar radiofrequency ablation, intralaminar and transforaminal epidural steroid injections with no meaningful relief.  Patient has failed to have benefit in pain level, functionality or quality of life with conservative measures.  Patient is an ideal candidate for spinal cord stimulation.    Interval History (7/7/2022): Jake Ortiz presents today for follow-up visit.  he underwent Bilateral L3/4 TF XUAN on 06/08/2022.  The patient reports that he is/was unchanged following the procedure.  he reports 0% pain relief. Discussed SCS trial at previous visit, patient has read over brochures and spoken with friends/family that have an SCS and is interested in pursuing this procedure. Recently underwent pacemaker replacement on 06/16/2022.  Patient reports pain as 7/10 today. Reports continued low back pain without radiation. Reports last night he took 3 extra strength tylenol  which temporarily mitigated his pain. Patient has undergone several injections including ESIs with varying levels and approaches, MBBs, RFAs, all with limited relief.      Interval history 05/10/2022  Patient presents for six-month follow-up.  He continues to report lower back pain at the level of L3-4 which radiates anteriorly.  Patient reports pain is exacerbated with prolonged standing and with ambulation.  Patient is interested in pursuing intervention.  Today patient denies significant lower extremity radiculopathy.  Again patient reiterates no significant relief following prior lumbar medial branch blocks or radiofrequency ablation.  Patient has read spinal cord stimulation literature and would like to continue with transforaminal injection at this time.    Interval history 11/17/2020  Patient presents for CT lumbar spine review.  Today patient reports pain has been relatively well controlled.  Patient reports he was dancing earlier today.  When pain is severe patient reports pain in the lower back which radiates down the right lower extremity along the lateral distribution to the calf.  Patient denies significant weakness in the lower extremities or new onset bowel or bladder incontinence or gait ataxia.  Patient reviewed spinal cord stimulation educational material and would like to pursue pharmacologic management prior to trialing this.    HPI:  09/22/2021  Jake Ortiz is a 82 y.o. male with past medical history significant for chronic restrictive lung disease, lumbar spondylosis with radiculopathy status post L2-5 laminectomy, hypertension, hyperlipidemia, coronary artery disease status post myocardial infarctions status post triple-vessel CABG, atrial fibrillation, congestive heart failure status post percutaneous pacemaker placement, stage 4 chronic kidney disease, prostate adenocarcinoma, type 2 diabetes, GERD, obstructive sleep apnea, multi joint arthralgia who presents to the clinic for the  evaluation of lower back and leg pain.  Patient reports longstanding history of lower back pain which is constant with radiation down the right lower extremity in L4-5 distribution to the calf.  Patient denies any radicular symptoms on the left.  Patient denies any significant weakness in bilateral lower extremities.  Pain is described as aching in nature and is a 7/10 today.  Pain at its worst is 10/10.  Pain is exacerbated with prolonged standing and with ambulation as well as with crossing his legs.  Patient is able to ambulate approximately 100 ft before requiring rest.  Pain is improved with lumbar flexion, stretching.  Patient has received multiple prior interventions including medial branch block, radiofrequency ablation, epidural and caudal epidural steroid injection without meaningful relief.    Of note patient last saw Dr. Trivedi December 16, 2020 with recommendation for medial branch block and consideration of radiofrequency ablation.    Patient denies night fever/night sweats, urinary incontinence, bowel incontinence, significant weight loss, significant motor weakness and loss of sensations.    Pain Disability Index Review:     Last 3 PDI Scores 9/6/2022 7/6/2022 5/10/2022   Pain Disability Index (PDI) 49 15 42       Non-Pharmacologic Treatments:  Physical Therapy/Home Exercise: yes  Ice/Heat:yes  TENS: no  Acupuncture: no  Massage: no  Chiropractic: no    Other: no      Pain Medications:  - Adjuvant Medications: Clonazepam (Klonopin) and Tylenol (Acetaminophen)  - Anti-Coagulants: Aspirin, Plavix ( Clopidogrel) and Eliquis    Pain Procedures:   Surgeries:  Lumbar surgery with Dr. Powers with complications with staph infection causing 2 subsequent surgeries    Injections  Dr. Demarco:  -06/08/2022:  Bilateral L3-4 transforaminal epidural steroid injection      Dr. Trivedi:  -11/22/19:L5/S1 IL XUAN on  with some pain relief for a few days  -01/6/2020: L5/S1 IL XUAN on  with limited pain relief for a few  days  -02/10/20: caudal XUAN on with 15% pain relief x 2 days  -10/12/2020 bilateral L3-5 medial branch blocks, 50% relief  -12/21/2020: Bilateral L3-5 medial branch block  -06/04/2021: Left-sided L3-5 radiofrequency ablation  -06/18/2021: Right-sided L3-5 radiofrequency ablation    Past Medical History:   Diagnosis Date    Abnormal PFT     Anemia     Arthritis     Atrial fibrillation 10/19/2017    Back pain     Congestive heart failure 03/05/2018    Coronary artery disease     DM (diabetes mellitus) 2018     am 06/23/2020    DM (diabetes mellitus) 2016     am 08/17/2021    Hyperlipemia     Hypertension     Myocardial infarction     NASREEN (obstructive sleep apnea) 06/05/2018    Prostate cancer 2015    Tobacco dependence     Type 2 diabetes mellitus      Past Surgical History:   Procedure Laterality Date    COLONOSCOPY N/A 9/22/2020    Procedure: COLONOSCOPY;  Surgeon: Javier Farmer MD;  Location: [facility];  Service: Endoscopy;  Laterality: N/A;    CORONARY ARTERY BYPASS GRAFT  1987    EPIDURAL STEROID INJECTION N/A 11/22/2019    Procedure: Lumbar L5/S1 IL XUAN;  Surgeon: Tacho Trivedi MD;  [facility];  Service: Pain Management;  Laterality: N/A;    EPIDURAL STEROID INJECTION N/A 1/6/2020    Procedure: Lumbar L5/S1 IL XUAN;  Surgeon: Tacho Trivedi MD;  Location: [facility];  Service: Pain Management;  Laterality: N/A;    EPIDURAL STEROID INJECTION N/A 2/10/2020    Procedure: Caudal XUAN;  Surgeon: Tacho Trivedi MD;  Location: [facility];  Service: Pain Management;  Laterality: N/A;    ESOPHAGOGASTRODUODENOSCOPY N/A 9/22/2020    Procedure: EGD (ESOPHAGOGASTRODUODENOSCOPY);  Surgeon: Javier Farmer MD;  Location: [facility] ENDO;  Service: Endoscopy;  Laterality: N/A;    INJECTION OF ANESTHETIC AGENT AROUND MEDIAL BRANCH NERVES INNERVATING LUMBAR FACET JOINT Bilateral 10/12/2020    Procedure: Bilateral L3-5 MBB;  Surgeon: Tacho Trivedi MD;  Location: [facility] PAIN  MGT;  Service: Pain Management;  Laterality: Bilateral;    INJECTION OF ANESTHETIC AGENT AROUND MEDIAL BRANCH NERVES INNERVATING LUMBAR FACET JOINT Bilateral 12/21/2020    Procedure: Bilateral L3-5 MBB with steroid;  Surgeon: Tacho Trivedi MD;  Location: [facility] PAIN MGT;  Service: Pain Management;  Laterality: Bilateral;    INTRALUMINAL GASTROINTESTINAL TRACT IMAGING VIA CAPSULE N/A 12/29/2021    Procedure: IMAGING PROCEDURE, GI TRACT, INTRALUMINAL, VIA CAPSULE;  Surgeon: Tahir Geronimo RN;  Location: [facility] ENDO;  Service: Endoscopy;  Laterality: N/A;    PROSTATE SURGERY      prostate radiation    RADIOFREQUENCY THERMOCOAGULATION Left 6/4/2021    Procedure: Left L3-5 Lumbar RFA;  Surgeon: Tacho Trivedi MD;  Location: [facility] PAIN MGT;  Service: Pain Management;  Laterality: Left;    RADIOFREQUENCY THERMOCOAGULATION Right 6/18/2021    Procedure: Right L3-5 Lumbar RFA;  Surgeon: Tacho Trivedi MD;  Location: [facility] PAIN MGT;  Service: Pain Management;  Laterality: Right;    REPLACEMENT OF PACEMAKER GENERATOR Left 6/16/2022    Procedure: REPLACEMENT, PULSE GENERATOR, CARDIAC PACEMAKER/CRT-P device;  Surgeon: Darrel Carrero MD;  Location: [facility] CATH LAB;  Service: Cardiology;  Laterality: Left;  NOT MRI safe   Pacer and leads implanted 9/30/2017, Dr. Souleymane CARROLLT Consulta CRT-P C4TR01, [id number]   A lead: Mdt 5050 CapSureFix® Novus, [id number]   RV lead: Mdt 5076 CapSureFix® Novus, [id number]   LV lead: Jovan 4196 At    SELECTIVE INJECTION OF ANESTHETIC AGENT AROUND LUMBAR SPINAL NERVE ROOT BY TRANSFORAMINAL APPROACH Bilateral 6/8/2022    Procedure: Bilateral L3/4 TF XUAN with RN IV sedation;  Surgeon: Philipp Demarco MD;  Location: [facility] PAIN MGT;  Service: Pain Management;  Laterality: Bilateral;    SPINE SURGERY      fusion    TONSILLECTOMY       Review of patient's allergies indicates:  No Known Allergies    Current Outpatient Medications   Medication Sig    acetaminophen (TYLENOL) 500 MG tablet Take 2 tablets  (1,000 mg total) by mouth every 6 (six) hours as needed for Pain.    atorvastatin (LIPITOR) 80 MG tablet Take 1 tablet (80 mg total) by mouth every evening.    blood sugar diagnostic Strp Check blood glucose levels daily in the AM fasting and 1-2 times more daily.    cefadroxil (DURICEF) 500 MG Cap Take 1 capsule (500 mg total) by mouth every 12 (twelve) hours.    cholecalciferol, vitamin D3, (VITAMIN D3) 25 mcg (1,000 unit) capsule Take 1,000 Units by mouth once daily.    fluticasone propionate (FLONASE) 50 mcg/actuation nasal spray USE 2 SPRAYS IN EACH NOSTRIL EVERY DAY    furosemide (LASIX) 40 MG tablet Take 1 tablet (40 mg total) by mouth 2 (two) times daily. Can double to 2 tablets twice a day for 3 days for more swelling/fluid build up - take 80mg twice a day    krill/om-3/dha/epa/phospho/ast (MEGARED OMEGA-3 KRILL OIL ORAL) Take 1 capsule by mouth every morning.    lancets Misc Check blood glucose levels daily in the AM fasting and 1-2 times more daily.    levocetirizine (XYZAL) 5 MG tablet TAKE 1 TABLET EVERY EVENING    losartan (COZAAR) 50 MG tablet Take 1 tablet (50 mg total) by mouth once daily.    multivitamin capsule Take 1 capsule by mouth once daily.    pantoprazole (PROTONIX) 40 MG tablet Take 1 tablet (40 mg total) by mouth once daily.    potassium chloride SA (K-DUR,KLOR-CON) 20 MEQ tablet Take 1 tablet (20 mEq total) by mouth once daily.    vit A/vit C/vit E/zinc/copper (OCUVITE PRESERVISION ORAL) Take 1 capsule by mouth every evening.    apixaban (ELIQUIS) 2.5 mg Tab Take 1 tablet (2.5 mg total) by mouth 2 (two) times daily.    clonazePAM (KLONOPIN) 1 MG tablet Take 1 tablet (1 mg total) by mouth nightly as needed for Anxiety.    magnesium oxide (MAG-OX) 400 mg (241.3 mg magnesium) tablet Take 1 tablet (400 mg total) by mouth once daily.    pyridoxine, vitamin B6, (B-6) 100 MG Tab Take 50 mg by mouth once daily.    spironolactone (ALDACTONE) 25 MG tablet Take 1 tablet (25 mg total) by mouth once  "daily.     No current facility-administered medications for this visit.     Facility-Administered Medications Ordered in Other Visits   Medication    ondansetron injection 4 mg       Review of Systems     GENERAL:  No weight loss, malaise or fevers.  HEENT:   No recent changes in vision or hearing  NECK:  Negative for lumps, no difficulty with swallowing.  RESPIRATORY:  Negative for cough, wheezing or shortness of breath, patient denies any recent URI.  CARDIOVASCULAR:  Negative for chest pain, leg swelling or palpitations.  GI:  Negative for abdominal discomfort, blood in stools or black stools or change in bowel habits.  MUSCULOSKELETAL:  See HPI.  SKIN:  Negative for lesions, rash, and itching.  PSYCH:  No mood disorder or recent psychosocial stressors.   HEMATOLOGY/LYMPHOLOGY:  Negative for prolonged bleeding, bruising easily or swollen nodes.    NEURO:   No history of headaches, syncope, paralysis, seizures or tremors.  All other reviewed and negative other than HPI.    OBJECTIVE:    /64 (BP Location: Left arm)   Pulse 66   Ht 5' 9" (1.753 m)   Wt 87.2 kg (192 lb 3.9 oz)   BMI 28.39 kg/m²       Physical Exam    GENERAL: Well appearing, in no acute distress, alert and oriented x3.  PSYCH:  Mood and affect appropriate.  SKIN: Skin color, texture, turgor normal, no rashes or lesions.  HEAD/FACE:  Normocephalic, atraumatic. Cranial nerves grossly intact.    CV: RRR with palpation of the radial artery.  PULM: No evidence of respiratory difficulty, symmetric chest rise.  GI:  Soft and non-tender.    BACK: Straight leg raising in the sitting and supine positions is negative to radicular pain. No pain to palpation over the facet joints of the lumbar spine or spinous processes. Normal range of motion without pain reproduction.  EXTREMITIES: Peripheral joint ROM is full and pain free without obvious instability or laxity in all four extremities. No deformities, edema, or skin discoloration. Good capillary " refill.  MUSCULOSKELETAL: Able to stand on heels & toes.   Shoulder, hip, and knee provocative maneuvers are negative.  There is no pain with palpation over the sacroiliac joints bilaterally.  FABERs test is negative.  FAER test is negative bilaterally.   Bilateral upper and lower extremity strength is normal and symmetric.  No atrophy or tone abnormalities are noted.  RIGHT Lower extremity: Hip flexion 5/5, Hip Abduction 5/5, Hip Adduction 5/5, Knee extension 5/5, Knee flexion 5/5, Ankle dorsiflexion5/5, Extensor hallucis longus 5/5, Ankle plantarflexion 5/5  LEFT Lower extremity:  Hip flexion 5/5, Hip Abduction 5/5,Hip Adduction 5/5, Knee extension 5/5, Knee flexion 5/5, Ankle dorsiflexion 5/5, Extensor hallucis longus 5/5, Ankle plantarflexion 5/5  -Normaltesting knee (patellar) jerk and ankle (achilles) jerk    NEURO: Bilateral upper and lower extremity coordination and muscle stretch reflexes are physiologic and symmetric. No loss of sensation is noted.  GAIT: normal.    Imaging:   CT thoracic spine 07/06/2022     FINDINGS:  Minor thoracic dextroscoliosis is present.     Otherwise the thoracic vertebra reveal normal alignment.  There may be minor demineralization or osteopenia present.     No significant compression fracture is seen.     There is mild multilevel thoracic degenerative disc disease with moderate T10-11 degenerative disc disease.  Severe T12-L1 degenerative disc disease with vacuum disc phenomenon, endplate sclerosis and large ventral osteophyte is evident.     No evidence of fracture.     Multilevel facet arthrosis is present particularly at the T9-10 disc level.  Multilevel costovertebral joint arthrosis is present notably at the T10, T11 and T12 levels.       CT lumbar spine 09/22/2021  FINDINGS:  Right total nephrectomy again noted.  Advanced atherosclerotic calcification.     Levoconvex lower lumbar scoliotic curvature secondary to asymmetric disc height loss on the right at L3-4 and L4-5.      T12-L1: Severe disc height loss with endplate sclerosis, vacuum disc phenomenon, and anterior osteophyte formation.  No spinal canal stenosis.  No significant right neural foraminal stenosis.  Moderate left neural foraminal stenosis.     L1-2: No significant disc height loss.  Hypertrophic facet arthropathy.     L2-3: Chronic ankylosis of L2-3.  Moderate right and mild left neural foraminal stenosis.  No spinal canal stenosis.  Ankylosis of posterior elements also noted.     L3-4: Severe disc height loss with endplate sclerosis and osteophyte formation.  Severe right and moderate to severe left neural foraminal stenosis.  Severe hypertrophic facet arthropathy.     L4-5: Laminectomy changes noted. Leftward chronic subluxation of L4 relative to L3 and L5 with right worse than left disc height loss with endplate sclerosis and osteophyte formation.  Right-sided L4 vertebral height loss.  Severe right and mild left neural foraminal stenosis.  Severe hypertrophic facet arthropathy.     L5-S1: No significant disc height loss.  Mild left neural foraminal stenosis.  Severe hypertrophic facet arthropathy.     Impression:   Severe chronic multilevel discogenic degenerative change and facet arthropathy of the lumbar spine as described above.       06/08/11  MRI Lumbar Spine W WO Cont    RESULTS:  Indication:     Back pain patient's had extensive operative intervention  with laminectomy starting at L3 and extending to mid L4. There also  appears to be a right laminectomy at L4-L5. Multiple pedicle screws  apparently have been removed.    L1-L2 level unremarkable.    L2-L3 disc is narrowed there is mild spondylosis with minor facet  arthropathy present. There is some posterior paravertebral edema present  right greater than left extending from right facet lesion of L2-L3. There  is mild central stenosis present but the L2 nerve roots exit without  contact or distortion. There screw artifacts present in the pedicles at  L2 and  L3-L4 and L5.    L3-L4 disc is narrowed there is mild spondylosis patient's had  laminectomy there is extensive para spinous muscular edema with fluid  along the left lamina and paraspinous region is also considerable loss of  fat plane within the paravertebral musculature of the spine from L1-L2  down to sacral region.    L4-L5 level is obliterated right laminectomy is present is extensive  edema and loss of fat planes within the paravertebral musculature and  around the posterior and lateral thecal sac.    The L5-S1 disc is intact there is facet arthropathy present.    Postcontrast study demonstrates diffuse enhancement of the  paravertebral  soft tissues starting at approximately the T12 and extending to the  sacral level.    There is diffuse enhancement of the L2-L3 disc with enhancing tissues of  the posterior paraspinous region and minor facet joint enhancement. There  is also enhancement of the posterior epidural region resulting in some  distortion of the dorsal lateral thecal sac, left greater than right.    The L3-L4 level demonstrates minimal central and left-sided enhancement  of the disc with extensive  enhancement of the paraspinous elements with  enhancement of the posterior and lateral epidural canal. No intradural  enhancement is noted. There is some enhancement around the right residual  facet . A portion of the left facet complex is absent..    The L4-L5 level again demonstrates comparable diffuse paraspinous  enhancement and enhancement surrounding the thecal sac within the central  canal. There is enhancement of the bony elements including the pedicles  and facet complex.    L5-S1 demonstrate some  facet enhancement and posterior perithecal  enhancement      IMPRESSION:  Patient's had extensive operative intervention with removal of  pedicle screws at L2-L3 L4 and L5. There is extensive enhancement of the  operative bed in the posterior epidural region as well is some  enhancement of the scar  tissue around the thecal sac. There Is no  intradural or intramedullary enhancement although there is clumping of  the cauda and enhancement of the L2-L3 and L4-L5 disc. There is also  small amount enhancement of the posterior aspect of the L3-L4 disc.  enhancement consistent with gliosis. The various levels of bone  enhancement including the facet complex at L3 and L4 as well as portions  of the pedicles involving L5 involving and adjacent to the screw tracks..      09/25/19    X-Ray Lumbar Spine Complete 5 View    FINDINGS:  Scoliosis remains.  Vertebral body heights and alignment are stable.  Multilevel advanced degenerative disc height loss and osteophyte formation noted.  Multilevel facet arthropathy present more prevalent at the lower lumbar spine.  No acute osseous abnormality appreciated.  Aorta iliac atherosclerotic vascular calcifications noted.    Impression  Similar appearance of the spine.  Correlate with MRI exam as clinically indicated.    08/26/14    X-Ray Cervical Spine AP And Lateral    Narrative  Findings: Vertebral alignment is normal.  There is narrowing of the disk spaces and  anterior osteophyte formation C 3-7.  There are no compression fractures or acute abnormalities are seen.  Carotid calcifications are noted bilaterally.  IMPRESSION:  Advanced degenerative change in the cervical spine.        ASSESSMENT: 82 y.o. year old male with lower back and right leg pain, consistent with     1. Failed back surgical syndrome        2. Lumbar radiculopathy        3. Lumbar spondylosis        4. DDD (degenerative disc disease), lumbar        5. Spinal stenosis of lumbar region with neurogenic claudication        6. Chronic pain syndrome              PLAN:   - Interventions:   S/p bilateral interlaminar epidural and transforaminal epidural steroid injections, caudal epidural steroid injection, medial branch block and lumbar radiofrequency ablation without significant or sustained relief in radicular or  lower back axial pain, respectively.  -patient is a candidate for spinal cord stimulation secondary to failed improvement in pain with numerous prior etiologies including pharmacologic management, physical therapy and interventions.  Patient would like to move forward with SCS.    -history of atrial fibrillation, myocardial infarction, coronary artery disease status post triple-vessel CABG, congestive heart failure with TR/MR (EF 35%) status post pacemaker placement:  Dr. Hood clearance: 10/27/22:  Low cardiovascular risk, okay to hold Eliquis 3 days prior and resume postop.  -psychological clearance:  08/16/2022: Ms. Beasley, LCSW  -CT thoracic spine:  07/06/2022  -CBC, CMP: 08/19/2022    - Anticoagulation use: yes  Eliquis  Per ASA guidelines for secondary prophylaxis, patient will need to pause Eliquis 3 days prior to his trial and implant.  Will obtain cardiac clearance from      report:  Reviewed and consistent with medication use as prescribed.       - Medications:  Patient did not tolerate membrane stabilizing agents.  We will pursue interventional treatment.    - Therapy:   We discussed continuing physical therapy to help manage the patient/s painful condition. The patient was counseled that muscle strengthening will improve the long term prognosis in regards to pain and may also help increase range of motion and mobility.     - Imaging: Reviewed available imaging with patient and answered any questions they had regarding study     - Follow up visit: return to clinic in 1 week following trial      The above plan and management options were discussed at length with patient. Patient is in agreement with the above and verbalized understanding.    - I discussed the goals of interventional chronic pain management with the patient on today's visit. We discussed a multimodal and systematic approach to pain.  This includes diagnostic and therapeutic injections, adjuvant pharmacologic treatment, physical  therapy, and at times psychiatry.  I emphasized the importance of regular exercise, core strengthening and stretching, diet and weight loss as a cornerstone of long-term pain management.    - This condition does not require this patient to take time off of work, and the primary goal of our Pain Management services is to improve the patient's functional capacity.  - Patient Questions: Answered all of the patient's questions regarding diagnoses, therapy, treatment and next steps        Philipp Demarco MD  Interventional Pain Management  [facility]    Disclaimer:  This note was prepared using voice recognition system and is likely to have sound alike errors that may have been overlooked even after proof reading.  Please call me with any questions

## 2022-09-06 NOTE — TELEPHONE ENCOUNTER
Patient contacted and was advised per provider that dr. Hood does not recall talking to him about having the Watchman here in [place], it was put on hold for awhile so told him to follow up with Dr. Price in /[facility] and then decide, I am not sure who implants them locally but he can establish with cardiology outside of [facility] and then go from there but may take a while longer.       The patient stated that he will try to get the spinal stimulator before the heart dec=vice is discussed. The patient stated he will find another provider eventually for that service. The patient stated he will not be traveling to [facility] in the future for any appointments.   Appointment made in November for device  decision updates.

## 2022-09-08 NOTE — PROGRESS NOTES
"Subjective:      Patient ID: Jake Ortiz is a 82 y.o. male.    Chief Complaint: Follow-up (Anemia of CKD)      HPI:  Mr. Ortiz is a pleasant 82-year-old male who presents today for follow-up of anemia due to CKD. He has been receiving epo 40kU weekly as needed for hgb<10g/dL. He has needed blood transfusions in the past. Colonoscopy in 2020 showed internal hemorrhoids and small polyps- recommendation to repeat in 5 years. EGD in 2020 showed some gastritis. He then had VCE in 2021 which was normal. Pt seen by GI 2022 for positive occult stool samples. GI suspected positive occult blood due to internal hemorrhoids and referred to surgery, however, Dr. Moody did not think hemorrhoids are large contributing factor to his anemia and recommended repeating upper and lower endoscopy, but pt not agreeable to this at this time.    Interval History: Pt states overall he feels "pretty good." He does c/o sob with exertion and notes he had pacemaker replacement about a month ago. Pt states the main problem he has is back pain which is aggravated with bending over--currently followed by pain management--considering stimulator placement. Denies n/v/d/c, lightheadedness, dizziness, hematuria, melena, hematochezia.        Social History     Socioeconomic History    Marital status:     Number of children: 0   Occupational History    Occupation: retired     Employer: [employer]   Tobacco Use    Smoking status: Former     Packs/day: 1.50     Years: 20.00     Pack years: 30.00     Types: Cigarettes     Start date:      Quit date:      Years since quittin.7    Smokeless tobacco: Never   Substance and Sexual Activity    Alcohol use: No    Drug use: No    Sexual activity: Not Currently     Social Determinants of Health     Financial Resource Strain: Low Risk     Difficulty of Paying Living Expenses: Not hard at all   Food Insecurity: No Food Insecurity    Worried About Running Out of Food in the Last " Year: Never true    Ran Out of Food in the Last Year: Never true   Transportation Needs: No Transportation Needs    Lack of Transportation (Medical): No    Lack of Transportation (Non-Medical): No   Physical Activity: Inactive    Days of Exercise per Week: 0 days    Minutes of Exercise per Session: 0 min   Stress: No Stress Concern Present    Feeling of Stress : Only a little   Social Connections: Socially Integrated    Frequency of Communication with Friends and Family: More than three times a week    Frequency of Social Gatherings with Friends and Family: More than three times a week    Attends Oriental orthodox Services: More than 4 times per year    Active Member of Clubs or Organizations: Yes    Attends Club or Organization Meetings: More than 4 times per year    Marital Status:    Housing Stability: Low Risk     Unable to Pay for Housing in the Last Year: No    Number of Places Lived in the Last Year: 1    Unstable Housing in the Last Year: No       Family History   Problem Relation Age of Onset    Heart attack Mother     Diabetes Mother     Heart disease Mother     Cataracts Mother     Stroke Father     Heart disease Father     Heart disease Brother        Past Surgical History:   Procedure Laterality Date    COLONOSCOPY N/A 9/22/2020    Procedure: COLONOSCOPY;  Surgeon: Javier Farmer MD;  Location: [facility] ENDO;  Service: Endoscopy;  Laterality: N/A;    CORONARY ARTERY BYPASS GRAFT  1987    EPIDURAL STEROID INJECTION N/A 11/22/2019    Procedure: Lumbar L5/S1 IL XUAN;  Surgeon: Tacho Trivedi MD;  Location: [facility] PAIN MGT;  Service: Pain Management;  Laterality: N/A;    EPIDURAL STEROID INJECTION N/A 1/6/2020    Procedure: Lumbar L5/S1 IL XUAN;  Surgeon: Tacho Trivedi MD;  Location: [facility] PAIN MGT;  Service: Pain Management;  Laterality: N/A;    EPIDURAL STEROID INJECTION N/A 2/10/2020    Procedure: Caudal XUAN;  Surgeon: Tacho Trivedi MD;  Location: [facility] PAIN MGT;  Service: Pain Management;   Laterality: N/A;    ESOPHAGOGASTRODUODENOSCOPY N/A 9/22/2020    Procedure: EGD (ESOPHAGOGASTRODUODENOSCOPY);  Surgeon: Javier Farmer MD;  Location: [facility] ENDO;  Service: Endoscopy;  Laterality: N/A;    INJECTION OF ANESTHETIC AGENT AROUND MEDIAL BRANCH NERVES INNERVATING LUMBAR FACET JOINT Bilateral 10/12/2020    Procedure: Bilateral L3-5 MBB;  Surgeon: Tacho Trivedi MD;  Location: [facility] PAIN MGT;  Service: Pain Management;  Laterality: Bilateral;    INJECTION OF ANESTHETIC AGENT AROUND MEDIAL BRANCH NERVES INNERVATING LUMBAR FACET JOINT Bilateral 12/21/2020    Procedure: Bilateral L3-5 MBB with steroid;  Surgeon: Tacho Trivedi MD;  Location: [facility] PAIN MGT;  Service: Pain Management;  Laterality: Bilateral;    INTRALUMINAL GASTROINTESTINAL TRACT IMAGING VIA CAPSULE N/A 12/29/2021    Procedure: IMAGING PROCEDURE, GI TRACT, INTRALUMINAL, VIA CAPSULE;  Surgeon: Tahir Geronimo RN;  Location: [facility] ENDO;  Service: Endoscopy;  Laterality: N/A;    PROSTATE SURGERY      prostate radiation    RADIOFREQUENCY THERMOCOAGULATION Left 6/4/2021    Procedure: Left L3-5 Lumbar RFA;  Surgeon: Tacho Trivedi MD;  Location: [facility] PAIN MGT;  Service: Pain Management;  Laterality: Left;    RADIOFREQUENCY THERMOCOAGULATION Right 6/18/2021    Procedure: Right L3-5 Lumbar RFA;  Surgeon: Tacho Trivedi MD;  Location: [facility] PAIN MGT;  Service: Pain Management;  Laterality: Right;    REPLACEMENT OF PACEMAKER GENERATOR Left 6/16/2022    Procedure: REPLACEMENT, PULSE GENERATOR, CARDIAC PACEMAKER/CRT-P device;  Surgeon: Darrel Carrero MD;  Location: [facility] CATH LAB;  Service: Cardiology;  Laterality: Left;  NOT MRI safe   Pacer and leads implanted 9/30/2017, Dr. Souleymane CARROLLT Consulta CRT-P C4TR01, [id number]   A lead: Mdt 5099 CapSureFix® Novus, [id number]   RV lead: Mdt 5046 CapSureFix® Novus, [id number]   LV lead: Jovan 4196 At    SELECTIVE INJECTION OF ANESTHETIC AGENT AROUND LUMBAR SPINAL NERVE ROOT BY TRANSFORAMINAL  APPROACH Bilateral 6/8/2022    Procedure: Bilateral L3/4 TF XUAN with RN IV sedation;  Surgeon: Philipp Demarco MD;  Location: [facility];  Service: Pain Management;  Laterality: Bilateral;    SPINE SURGERY      fusion    TONSILLECTOMY         Past Medical History:   Diagnosis Date    Abnormal PFT     Anemia     Arthritis     Atrial fibrillation 10/19/2017    Back pain     Congestive heart failure 03/05/2018    Coronary artery disease     DM (diabetes mellitus) 2018     am 06/23/2020    DM (diabetes mellitus) 2016     am 08/17/2021    Hyperlipemia     Hypertension     Myocardial infarction     NASREEN (obstructive sleep apnea) 06/05/2018    Prostate cancer 2015    Tobacco dependence     Type 2 diabetes mellitus        Review of Systems   Constitutional:  Negative for activity change, appetite change, chills, diaphoresis, fatigue, fever and unexpected weight change.   HENT:  Negative for congestion, nosebleeds and rhinorrhea.    Respiratory:  Negative for cough and shortness of breath.    Cardiovascular:  Negative for chest pain and leg swelling.   Gastrointestinal:  Negative for abdominal pain, blood in stool, constipation, diarrhea, nausea and vomiting.   Genitourinary:  Negative for hematuria.   Musculoskeletal:  Positive for arthralgias and back pain. Negative for gait problem and myalgias.   Skin:  Negative for color change, pallor and rash.   Neurological:  Negative for dizziness, weakness, light-headedness, numbness and headaches.     Medication List with Changes/Refills   Current Medications    ACETAMINOPHEN (TYLENOL) 500 MG TABLET    Take 2 tablets (1,000 mg total) by mouth every 6 (six) hours as needed for Pain.    APIXABAN (ELIQUIS) 2.5 MG TAB    Take 1 tablet (2.5 mg total) by mouth 2 (two) times daily.    ATORVASTATIN (LIPITOR) 80 MG TABLET    Take 1 tablet (80 mg total) by mouth every evening.    BLOOD SUGAR DIAGNOSTIC STRP    Check blood glucose levels daily in the AM fasting and 1-2 times  more daily.    CEFADROXIL (DURICEF) 500 MG CAP    Take 1 capsule (500 mg total) by mouth every 12 (twelve) hours.    CHOLECALCIFEROL, VITAMIN D3, (VITAMIN D3) 25 MCG (1,000 UNIT) CAPSULE    Take 1,000 Units by mouth once daily.    CLONAZEPAM (KLONOPIN) 1 MG TABLET    Take 1 tablet (1 mg total) by mouth nightly as needed for Anxiety.    FLUTICASONE PROPIONATE (FLONASE) 50 MCG/ACTUATION NASAL SPRAY    USE 2 SPRAYS IN EACH NOSTRIL EVERY DAY    FUROSEMIDE (LASIX) 40 MG TABLET    Take 1 tablet (40 mg total) by mouth 2 (two) times daily. Can double to 2 tablets twice a day for 3 days for more swelling/fluid build up - take 80mg twice a day    KRILL/OM-3/DHA/EPA/PHOSPHO/AST (MEGARED OMEGA-3 KRILL OIL ORAL)    Take 1 capsule by mouth every morning.    LANCETS MISC    Check blood glucose levels daily in the AM fasting and 1-2 times more daily.    LEVOCETIRIZINE (XYZAL) 5 MG TABLET    TAKE 1 TABLET EVERY EVENING    LOSARTAN (COZAAR) 50 MG TABLET    Take 1 tablet (50 mg total) by mouth once daily.    MAGNESIUM OXIDE (MAG-OX) 400 MG (241.3 MG MAGNESIUM) TABLET    Take 1 tablet (400 mg total) by mouth once daily.    METHYLPREDNISOLONE (MEDROL DOSEPACK) 4 MG TABLET    use as directed    MULTIVITAMIN CAPSULE    Take 1 capsule by mouth once daily.    PANTOPRAZOLE (PROTONIX) 40 MG TABLET    Take 1 tablet (40 mg total) by mouth once daily.    POTASSIUM CHLORIDE SA (K-DUR,KLOR-CON) 20 MEQ TABLET    Take 1 tablet (20 mEq total) by mouth once daily.    PYRIDOXINE, VITAMIN B6, (B-6) 100 MG TAB    Take 50 mg by mouth once daily.    SPIRONOLACTONE (ALDACTONE) 25 MG TABLET    Take 1 tablet (25 mg total) by mouth once daily.    VIT A/VIT C/VIT E/ZINC/COPPER (OCUVITE PRESERVISION ORAL)    Take 1 capsule by mouth every evening.        Objective:     Vitals:    09/08/22 0945   BP: 125/73   Pulse: 78   Temp: 97 °F (36.1 °C)       Physical Exam  Vitals reviewed.     Lab Results   Component Value Date    WBC 4.57 09/06/2022    HGB 8.1 (L)  09/06/2022    HCT 30.1 (L) 09/06/2022    MCV 84 09/06/2022     (H) 09/06/2022       Lab Results   Component Value Date     09/06/2022    K 4.3 09/06/2022     09/06/2022    CO2 21 (L) 09/06/2022    BUN 32 (H) 09/06/2022    CREATININE 1.7 (H) 09/06/2022    CALCIUM 9.3 09/06/2022    ANIONGAP 12 09/06/2022    ESTGFRAFRICA 50 (A) 07/14/2022    EGFRNONAA 43 (A) 07/14/2022     Lab Results   Component Value Date    ALT 7 (L) 09/06/2022    AST 15 09/06/2022    GGT 58 (H) 08/08/2018    ALKPHOS 67 09/06/2022    BILITOT 0.4 09/06/2022       Assessment/Plan:     Problem List Items Addressed This Visit          Oncology    Iron deficiency anemia due to chronic blood loss     Lab Results   Component Value Date    IRON 13 (L) 08/12/2022    TRANSFERRIN 283 08/12/2022    TIBC 419 08/12/2022    FESATURATED 3 (L) 08/12/2022        FERRITIN                                                      13    Plan for Injectafer x 2 doses for iron repletion           Anemia associated with stage 4 chronic renal failure - Primary    Relevant Orders    CBC Auto Differential    Comprehensive Metabolic Panel    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   Continue retacrit weekly ow    Follow-up in 4 weeks with cbc & cmp  Plan to repeat iron studies 6-8 weeks post last IV iron dose                  DANIEL Alvarez  Hematology/Oncology

## 2022-09-15 NOTE — PLAN OF CARE
Problem: Adult Inpatient Plan of Care  Goal: Plan of Care Review  Outcome: Ongoing, Progressing  Flowsheets (Taken 9/15/2022 1258)  Plan of Care Reviewed With: patient  Goal: Patient-Specific Goal (Individualized)  Outcome: Ongoing, Progressing  Flowsheets (Taken 9/15/2022 1258)  Anxieties, Fears or Concerns: None today  Individualized Care Needs: Pillow under IV arm, snack and drink offered  Patient-Specific Goals (Include Timeframe): Tolerate infusion today  Goal: Optimal Comfort and Wellbeing  Outcome: Ongoing, Progressing  Intervention: Provide Person-Centered Care  Flowsheets (Taken 9/15/2022 1258)  Trust Relationship/Rapport:   care explained   questions encouraged   choices provided   reassurance provided   emotional support provided   thoughts/feelings acknowledged   empathic listening provided   questions answered

## 2022-09-20 NOTE — ASSESSMENT & PLAN NOTE
Lab Results   Component Value Date    HGB 8.1 (L) 09/06/2022     Retacrit 40K units today.   Continue retacrit weekly ow    Follow-up in 4 weeks with cbc & cmp  Plan to repeat iron studies 6-8 weeks post last IV iron dose

## 2022-09-20 NOTE — ASSESSMENT & PLAN NOTE
Lab Results   Component Value Date    IRON 13 (L) 08/12/2022    TRANSFERRIN 283 08/12/2022    TIBC 419 08/12/2022    FESATURATED 3 (L) 08/12/2022        FERRITIN                                                      13    Plan for Injectafer x 2 doses for iron repletion

## 2022-09-22 NOTE — PLAN OF CARE
Problem: Adult Inpatient Plan of Care  Goal: Plan of Care Review  Outcome: Ongoing, Progressing  Flowsheets (Taken 9/22/2022 1118)  Plan of Care Reviewed With: patient  Goal: Patient-Specific Goal (Individualized)  Outcome: Ongoing, Progressing  Flowsheets (Taken 9/22/2022 1118)  Anxieties, Fears or Concerns: patient reports fatigue and weakness  Individualized Care Needs: Pillow and blanket offered for comfort measures  Patient-Specific Goals (Include Timeframe): tolerate treatment without adverse reaction  Goal: Optimal Comfort and Wellbeing  Outcome: Ongoing, Progressing  Intervention: Provide Person-Centered Care  Flowsheets (Taken 9/22/2022 1118)  Trust Relationship/Rapport:   care explained   questions encouraged   reassurance provided   choices provided   emotional support provided   thoughts/feelings acknowledged   empathic listening provided   questions answered

## 2022-09-27 NOTE — PROGRESS NOTES
Subjective:   Patient ID:  Jake Ortiz is a 82 y.o. male who presents for evaluation of Atrial Fibrillation      Congestive Heart Failure  Pertinent negatives include no abdominal pain, chest pain, claudication, near-syncope, palpitations or shortness of breath.     Jake Ortiz is a 81 year old male who presents to CHF clinic for hospital follow up.     His current medical conditions include CAD s/p CABG, old MI, HFpEF with TR/MR, AVB s/p CRT, PAF on Eliquis, Anemia, HTN, HLP, DM Type II, CKD Stage IV.     He was recently admitted due to shortness of breath with LE edema and weight gain over the previous few days.     ED workup revealed , H/H 8/29. He reported recent dietary indiscretion with boiled crawfish. Repeat ECHO revealed EF 35%, Grade III/IV DD, Mild RVE with reduced RV function. He returns today and states he is doing ok.     Has lost from 219>>192 pounds since hospital discharge. He is doing a much better job since hospital discharge with diet and fluid restrictions. Previously, he was not following any restrictions.       Denies chest pain or anginal equivalents. No shortness of breath, KING or palpitations. Denies orthopnea, PND or abdominal bloating. Reports regular walking without any issues lately. NO leg swelling or claudications. No recent falls, syncope or near syncopal events. Reports compliance with medications and dietary restrictions. NO CNS complaints to suggest TIA or CVA today. No signs of abnormal bleeding on Eliquis, ASA.       He is still very limited with physical activity due to shortness of breath after a few feet. So he is very sedentary due to this. Today, He is NYHA FC III.     6/9/2022 update    Jake Ortiz returns to clinic for follow up and to discuss CRT P gen change with Dr Carrero.     He stopped Farxiga due to issues with lightheadedness and dizziness. Aldactone held for 5 days due to bump in renal function and resumed aldactone today. Denies any CHF  complaints today in office.     Denies chest pain or anginal equivalents. NO shortness of breath, KING or palpitations. Weight has been stable at home around 181-183 pounds since last visit. Denies orthopnea, PND or abdominal bloating. Doing well with limiting salt intake recently.       7/11/2022 update    Jake Ortiz returns for CHF follow up and is doing well today.   He has noted improvement in functional capacity since CRT P gen change.     Has been able to walk more without any shortness of breath. When he does develop shortness of breath, he recovers much faster than previously.     Denies chest pain or anginal equivalents. No shortness of breath, KING or palpitations. Denies orthopnea, PND or abdominal bloating. Reports regular walking without any issues lately. NO leg swelling or claudications. No recent falls, syncope or near syncopal events. Reports compliance with medications and dietary restrictions. NO CNS complaints to suggest TIA or CVA today. No signs of abnormal bleeding on Eliquis.     9/27/2022 update    Jake Ortiz returns for follow up.       He is doing well CHF wise today.     Has recently received 2 units PRBC transfusion with improvement in H/H on repeat labs. Receiving Retacrit injections regularly.     Denies any shortness of breath, KING or palpitations. Does have to take breaks if he walks 1/4 mile or longer but is able to easily complete with breaks.     No chest pain or anginal equivalents. No leg swelling on exam today. His main limitation is due to back pain issues.     NO signs of abnormal bleeding on eliquis.           Past Medical History:   Diagnosis Date    Abnormal PFT     Anemia     Arthritis     Atrial fibrillation 10/19/2017    Back pain     Congestive heart failure 03/05/2018    Coronary artery disease     DM (diabetes mellitus) 2018     am 06/23/2020    DM (diabetes mellitus) 2016     am 08/17/2021    Hyperlipemia     Hypertension     Myocardial infarction      NASREEN (obstructive sleep apnea) 06/05/2018    Prostate cancer 2015    Tobacco dependence     Type 2 diabetes mellitus        Past Surgical History:   Procedure Laterality Date    COLONOSCOPY N/A 9/22/2020    Procedure: COLONOSCOPY;  Surgeon: Javier Farmer MD;  Location: [facility];  Service: Endoscopy;  Laterality: N/A;    CORONARY ARTERY BYPASS GRAFT  1987    EPIDURAL STEROID INJECTION N/A 11/22/2019    Procedure: Lumbar L5/S1 IL XUAN;  Surgeon: Tacho Trivedi MD;  Location: [facility] PAIN MGT;  Service: Pain Management;  Laterality: N/A;    EPIDURAL STEROID INJECTION N/A 1/6/2020    Procedure: Lumbar L5/S1 IL XUAN;  Surgeon: Tacho Trivedi MD;  Location: [facility] PAIN [facility]GT;  Service: Pain Management;  Laterality: N/A;    EPIDURAL STEROID INJECTION N/A 2/10/2020    Procedure: Caudal XUAN;  Surgeon: Tacho Trivedi MD;  Location: [facility];  Service: Pain Management;  Laterality: N/A;    ESOPHAGOGASTRODUODENOSCOPY N/A 9/22/2020    Procedure: EGD (ESOPHAGOGASTRODUODENOSCOPY);  Surgeon: Javier Farmer MD;  Location: [facility];  Service: Endoscopy;  Laterality: N/A;    INJECTION OF ANESTHETIC AGENT AROUND MEDIAL BRANCH NERVES INNERVATING LUMBAR FACET JOINT Bilateral 10/12/2020    Procedure: Bilateral L3-5 MBB;  Surgeon: Tacho Trivedi MD;  Location: [facility];  Service: Pain Management;  Laterality: Bilateral;    INJECTION OF ANESTHETIC AGENT AROUND MEDIAL BRANCH NERVES INNERVATING LUMBAR FACET JOINT Bilateral 12/21/2020    Procedure: Bilateral L3-5 MBB with steroid;  Surgeon: Tacho Trivedi MD;  Location: [facility];  Service: Pain Management;  Laterality: Bilateral;    INTRALUMINAL GASTROINTESTINAL TRACT IMAGING VIA CAPSULE N/A 12/29/2021    Procedure: IMAGING PROCEDURE, GI TRACT, INTRALUMINAL, VIA CAPSULE;  Surgeon: Tahir Geronimo RN;  Location: [facility];  Service: Endoscopy;  Laterality: N/A;    PROSTATE SURGERY      prostate radiation    RADIOFREQUENCY THERMOCOAGULATION Left  2021    Procedure: Left L3-5 Lumbar RFA;  Surgeon: Tacho Trivedi MD;  Location: [facility];  Service: Pain Management;  Laterality: Left;    RADIOFREQUENCY THERMOCOAGULATION Right 2021    Procedure: Right L3-5 Lumbar RFA;  Surgeon: Tacho Trivedi MD;  Location: [facility];  Service: Pain Management;  Laterality: Right;    REPLACEMENT OF PACEMAKER GENERATOR Left 2022    Procedure: REPLACEMENT, PULSE GENERATOR, CARDIAC PACEMAKER/CRT-P device;  Surgeon: Darrel Carrero MD;  Location: [facility] CATH LAB;  Service: Cardiology;  Laterality: Left;  NOT MRI safe   Pacer and leads implanted 2017, Dr. Souleymane CARROLLT Consulta CRT-P C4TR01, [id number]   A lead: Mdt 5076 CapSureFix® Novus, [id number]   RV lead: Mdt 5076 CapSureFix® Novus, [id number]   LV lead: Jovan 4196 At    SELECTIVE INJECTION OF ANESTHETIC AGENT AROUND LUMBAR SPINAL NERVE ROOT BY TRANSFORAMINAL APPROACH Bilateral 2022    Procedure: Bilateral L3/4 TF XUAN with RN IV sedation;  Surgeon: Philipp Demarco MD;  Location: [facility];  Service: Pain Management;  Laterality: Bilateral;    SPINE SURGERY      fusion    TONSILLECTOMY         Social History     Tobacco Use    Smoking status: Former     Packs/day: 1.50     Years: 20.00     Pack years: 30.00     Types: Cigarettes     Start date:      Quit date:      Years since quittin.7    Smokeless tobacco: Never   Substance Use Topics    Alcohol use: No    Drug use: No       Family History   Problem Relation Age of Onset    Heart attack Mother     Diabetes Mother     Heart disease Mother     Cataracts Mother     Stroke Father     Heart disease Father     Heart disease Brother      Wt Readings from Last 3 Encounters:   22 86 kg (189 lb 9.5 oz)   22 86.6 kg (190 lb 14.7 oz)   22 87.2 kg (192 lb 3.9 oz)     Temp Readings from Last 3 Encounters:   22 97.7 °F (36.5 °C) (Temporal)   09/15/22 97.4 °F (36.3 °C)   22 97.3 °F (36.3 °C)     BP Readings  from Last 3 Encounters:   09/27/22 106/60   09/22/22 (!) 103/51   09/15/22 (!) 105/58     Pulse Readings from Last 3 Encounters:   09/27/22 77   09/22/22 63   09/15/22 62     Current Outpatient Medications on File Prior to Visit   Medication Sig Dispense Refill    acetaminophen (TYLENOL) 500 MG tablet Take 2 tablets (1,000 mg total) by mouth every 6 (six) hours as needed for Pain.  0    apixaban (ELIQUIS) 2.5 mg Tab Take 1 tablet (2.5 mg total) by mouth 2 (two) times daily. 180 tablet 1    atorvastatin (LIPITOR) 80 MG tablet Take 1 tablet (80 mg total) by mouth every evening. 90 tablet 1    blood sugar diagnostic Strp Check blood glucose levels daily in the AM fasting and 1-2 times more daily. 300 strip 3    cefadroxil (DURICEF) 500 MG Cap Take 1 capsule (500 mg total) by mouth every 12 (twelve) hours. 6 capsule 0    cholecalciferol, vitamin D3, (VITAMIN D3) 25 mcg (1,000 unit) capsule Take 1,000 Units by mouth once daily.      fluticasone propionate (FLONASE) 50 mcg/actuation nasal spray USE 2 SPRAYS IN EACH NOSTRIL EVERY DAY 48 g 5    furosemide (LASIX) 40 MG tablet Take 1 tablet (40 mg total) by mouth 2 (two) times daily. Can double to 2 tablets twice a day for 3 days for more swelling/fluid build up - take 80mg twice a day 90 tablet 1    krill/om-3/dha/epa/phospho/ast (MEGARED OMEGA-3 KRILL OIL ORAL) Take 1 capsule by mouth every morning.      lancets Misc Check blood glucose levels daily in the AM fasting and 1-2 times more daily. 300 each 3    levocetirizine (XYZAL) 5 MG tablet TAKE 1 TABLET EVERY EVENING 90 tablet 1    losartan (COZAAR) 50 MG tablet Take 1 tablet (50 mg total) by mouth once daily. 90 tablet 3    magnesium oxide (MAG-OX) 400 mg (241.3 mg magnesium) tablet Take 1 tablet (400 mg total) by mouth once daily. 90 tablet 1    methylPREDNISolone (MEDROL DOSEPACK) 4 mg tablet use as directed 1 each 0    multivitamin capsule Take 1 capsule by mouth once daily.      pantoprazole (PROTONIX) 40 MG tablet  Take 1 tablet (40 mg total) by mouth once daily. 90 tablet 3    potassium chloride SA (K-DUR,KLOR-CON) 20 MEQ tablet Take 1 tablet (20 mEq total) by mouth once daily. 90 tablet 1    pyridoxine, vitamin B6, (B-6) 100 MG Tab Take 50 mg by mouth once daily.      spironolactone (ALDACTONE) 25 MG tablet Take 1 tablet (25 mg total) by mouth once daily. 90 tablet 1    vit A/vit C/vit E/zinc/copper (OCUVITE PRESERVISION ORAL) Take 1 capsule by mouth every evening.      clonazePAM (KLONOPIN) 1 MG tablet Take 1 tablet (1 mg total) by mouth nightly as needed for Anxiety. 90 tablet 0     Current Facility-Administered Medications on File Prior to Visit   Medication Dose Route Frequency Provider Last Rate Last Admin    ondansetron injection 4 mg  4 mg Intravenous Once PRN Tacho Trivedi MD           Review of Systems   Constitutional: Positive for malaise/fatigue.   HENT:  Negative for hearing loss and hoarse voice.    Eyes:  Negative for blurred vision and visual disturbance.   Cardiovascular:  Positive for dyspnea on exertion and irregular heartbeat. Negative for chest pain, claudication, leg swelling, near-syncope, orthopnea, palpitations, paroxysmal nocturnal dyspnea and syncope.   Respiratory:  Negative for cough, hemoptysis, shortness of breath, sleep disturbances due to breathing, snoring and wheezing.    Endocrine: Negative for cold intolerance and heat intolerance.   Hematologic/Lymphatic: Bruises/bleeds easily.   Skin:  Negative for color change, dry skin and nail changes.   Musculoskeletal:  Positive for arthritis and back pain. Negative for joint pain and myalgias.   Gastrointestinal:  Negative for bloating, abdominal pain, constipation, nausea and vomiting.   Genitourinary:  Negative for dysuria, flank pain, hematuria and hesitancy.   Neurological:  Negative for headaches, light-headedness, loss of balance, numbness, paresthesias and weakness.   Psychiatric/Behavioral:  Negative for altered mental status.   "  Allergic/Immunologic: Negative for environmental allergies.   /60 (BP Location: Right arm, Patient Position: Sitting)   Pulse 77   Ht 5' 9" (1.753 m)   Wt 86 kg (189 lb 9.5 oz)   SpO2 97%   BMI 28.00 kg/m²     Objective:   Physical Exam  Vitals and nursing note reviewed.   Constitutional:       General: He is not in acute distress.     Appearance: Normal appearance. He is well-developed. He is not ill-appearing.   HENT:      Head: Normocephalic and atraumatic.      Nose: Nose normal.      Mouth/Throat:      Mouth: Mucous membranes are moist.   Eyes:      Pupils: Pupils are equal, round, and reactive to light.   Neck:      Thyroid: No thyromegaly.      Vascular: No JVD.      Trachea: No tracheal deviation.   Cardiovascular:      Rate and Rhythm: Normal rate and regular rhythm.      Chest Wall: PMI is not displaced.      Pulses: Intact distal pulses.           Radial pulses are 2+ on the right side and 2+ on the left side.        Dorsalis pedis pulses are 2+ on the right side and 2+ on the left side.      Heart sounds: S1 normal and S2 normal. Heart sounds not distant. No murmur heard.     Comments: S/p CRT-P in excellent repair  Pulmonary:      Effort: Pulmonary effort is normal. No respiratory distress.      Breath sounds: Normal breath sounds. No wheezing.   Abdominal:      General: Bowel sounds are normal. There is no distension.      Palpations: Abdomen is soft.      Tenderness: There is no abdominal tenderness.   Musculoskeletal:         General: No swelling. Normal range of motion.      Cervical back: Full passive range of motion without pain, normal range of motion and neck supple.      Right ankle: No swelling.      Left ankle: No swelling.   Skin:     General: Skin is warm and dry.      Capillary Refill: Capillary refill takes less than 2 seconds.      Nails: There is no clubbing.   Neurological:      General: No focal deficit present.      Mental Status: He is alert and oriented to person, " place, and time. Mental status is at baseline.   Psychiatric:         Mood and Affect: Mood normal.         Speech: Speech normal.         Behavior: Behavior normal.         Thought Content: Thought content normal.         Judgment: Judgment normal.       Lab Results   Component Value Date    CHOL 124 07/14/2022    CHOL 126 07/07/2021    CHOL 171 03/20/2020     Lab Results   Component Value Date    HDL 43 07/14/2022    HDL 38 (L) 07/07/2021    HDL 38 (L) 03/20/2020     Lab Results   Component Value Date    LDLCALC 54.4 (L) 07/14/2022    LDLCALC 52.4 (L) 07/07/2021    LDLCALC 75.0 03/20/2020     Lab Results   Component Value Date    TRIG 133 07/14/2022    TRIG 178 (H) 07/07/2021    TRIG 290 (H) 03/20/2020     Lab Results   Component Value Date    CHOLHDL 34.7 07/14/2022    CHOLHDL 30.2 07/07/2021    CHOLHDL 22.2 03/20/2020       Chemistry        Component Value Date/Time     09/06/2022 0826    K 4.3 09/06/2022 0826     09/06/2022 0826    CO2 21 (L) 09/06/2022 0826    BUN 32 (H) 09/06/2022 0826    CREATININE 1.7 (H) 09/06/2022 0826     (H) 09/06/2022 0826        Component Value Date/Time    CALCIUM 9.3 09/06/2022 0826    ALKPHOS 67 09/06/2022 0826    AST 15 09/06/2022 0826    ALT 7 (L) 09/06/2022 0826    BILITOT 0.4 09/06/2022 0826    ESTGFRAFRICA 50 (A) 07/14/2022 1048    EGFRNONAA 43 (A) 07/14/2022 1048          Lab Results   Component Value Date    TSH 1.268 11/04/2020     Lab Results   Component Value Date    INR 1.1 06/16/2022    INR 1.2 11/01/2020    INR 1.2 10/23/2020     Lab Results   Component Value Date    WBC 6.23 09/22/2022    HGB 9.4 (L) 09/22/2022    HCT 32.8 (L) 09/22/2022    MCV 84 09/22/2022     09/22/2022     1. TTE  Results for orders placed during the hospital encounter of 04/28/22    Echo    Interpretation Summary  · The left ventricle is normal in size with concentric hypertrophy and moderately decreased systolic function.  · The estimated ejection fraction is 35%.  ·  Grade III left ventricular diastolic dysfunction.  · Mild right ventricular enlargement with moderately reduced right ventricular systolic function.  · Mild left atrial enlargement.  · Mild right atrial enlargement.  · There is mild aortic valve stenosis.  · Aortic valve area is 1.53 cm2; peak velocity is 1.78 m/s; mean gradient is 7 mmHg.  · Mild mitral regurgitation.  · Mild tricuspid regurgitation.  · Elevated central venous pressure (15 mmHg).  · The estimated PA systolic pressure is 37 mmHg.  · There is abnormal septal wall motion. There is systolic and diastolic flattening of the interventricular septum consistent with right ventricle pressure and volume overload.  · There is moderate left ventricular global hypokinesis.     Assessment:      1. Chronic combined systolic and diastolic congestive heart failure    2. Presence of cardiac resynchronization therapy pacemaker (CRT-P)    3. Arteriosclerosis of aorta    4. S/P CABG x 3    5. Primary hypertension    6. Mixed restrictive and obstructive lung disease    7. Lumbar radiculopathy, chronic          Plan:       Continue eliquis for cardio-embolic protection  Dash diet 2 gm sodium restriction  Encourage daily walking as tolerated    CHF SYNOPSIS:    GDMT:  ACE/ARB/ARNI: Losartan 50 mg BID  Beta Blocker: none  MRA: Aldactone 25 mg daily  SGLT2: intolerant of farxiga  Diuretic: Lasix 40 mg BID    RTC in 6 months    Nicole May, FNP-C [facility]

## 2022-10-05 NOTE — PROGRESS NOTES
Subjective:       Patient ID: Jake Ortiz is a 82 y.o. male.    Chief Complaint: Anemia    HPI: Mr. 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 He has been receiving epo 40kU weekly as needed for hgb<10g/dL. He has need blood transfusions in the past. Colonoscopy in 2020 showed internal hemorrhoids and small polyps- recommended repeat in 5 years. EGD in 2020 showed some gastritis. He then had VCE in 2021 which was normal.   He has taken oral iron in the distant past.Most recently he has had IV iron injectafer, last one 22.  GI suspected positive occult blood due to internal hemorrhoids and referred to surgery, however, Dr. Moody did not think hemorrhoids are large contributing factor to his anemia and recommended repeating upper and lower endoscopy, but pt not agreeable to this at this time.    Today: patient states his main problem he has is back pain which is aggravated with bending over--currently followed by pain management--considering stimulator placement. He states he feels good. He isn't fatigue. He denies any sob, bleeding, n/v/d/c, weight loss, chest pain, fevers, headaches. He has been able to participate in his general hobbies.     Social History     Socioeconomic History    Marital status:     Number of children: 0   Occupational History    Occupation: retired     Employer: [employer]   Tobacco Use    Smoking status: Former     Packs/day: 1.50     Years: 20.00     Pack years: 30.00     Types: Cigarettes     Start date:      Quit date:      Years since quittin.7    Smokeless tobacco: Never   Substance and Sexual Activity    Alcohol use: No    Drug use: No    Sexual activity: Not Currently     Social Determinants of Health     Financial Resource Strain: Low Risk     Difficulty of Paying Living Expenses: Not hard at all   Food Insecurity: No Food Insecurity    Worried About Running Out of Food in the Last Year: Never true     Ran Out of Food in the Last Year: Never true   Transportation Needs: No Transportation Needs    Lack of Transportation (Medical): No    Lack of Transportation (Non-Medical): No   Physical Activity: Inactive    Days of Exercise per Week: 0 days    Minutes of Exercise per Session: 0 min   Stress: No Stress Concern Present    Feeling of Stress : Not at all   Social Connections: Socially Integrated    Frequency of Communication with Friends and Family: More than three times a week    Frequency of Social Gatherings with Friends and Family: More than three times a week    Attends Restorationist Services: More than 4 times per year    Active Member of Clubs or Organizations: Yes    Attends Club or Organization Meetings: More than 4 times per year    Marital Status:    Housing Stability: High Risk    Unable to Pay for Housing in the Last Year: No    Number of Places Lived in the Last Year: 1    Unstable Housing in the Last Year: Yes       Past Medical History:   Diagnosis Date    Abnormal PFT     Anemia     Arthritis     Atrial fibrillation 10/19/2017    Back pain     Congestive heart failure 03/05/2018    Coronary artery disease     DM (diabetes mellitus) 2018     am 06/23/2020    DM (diabetes mellitus) 2016     am 08/17/2021    Hyperlipemia     Hypertension     Myocardial infarction     NASREEN (obstructive sleep apnea) 06/05/2018    Prostate cancer 2015    Tobacco dependence     Type 2 diabetes mellitus        Family History   Problem Relation Age of Onset    Heart attack Mother     Diabetes Mother     Heart disease Mother     Cataracts Mother     Stroke Father     Heart disease Father     Heart disease Brother        Past Surgical History:   Procedure Laterality Date    COLONOSCOPY N/A 9/22/2020    Procedure: COLONOSCOPY;  Surgeon: Javier Farmer MD;  Location: [facility];  Service: Endoscopy;  Laterality: N/A;    CORONARY ARTERY BYPASS GRAFT  1987    EPIDURAL STEROID INJECTION N/A 11/22/2019     Procedure: Lumbar L5/S1 IL XUAN;  Surgeon: Tacho Trivedi MD;  Location: [facility];  Service: Pain Management;  Laterality: N/A;    EPIDURAL STEROID INJECTION N/A 1/6/2020    Procedure: Lumbar L5/S1 IL XUAN;  Surgeon: Tacho Trivedi MD;  Location: [facility];  Service: Pain Management;  Laterality: N/A;    EPIDURAL STEROID INJECTION N/A 2/10/2020    Procedure: Caudal XUAN;  Surgeon: Tacho Trivedi MD;  [facility];  Service: Pain Management;  Laterality: N/A;    ESOPHAGOGASTRODUODENOSCOPY N/A 9/22/2020    Procedure: EGD (ESOPHAGOGASTRODUODENOSCOPY);  Surgeon: Javier Farmer MD;  Location: [facility];  Service: Endoscopy;  Laterality: N/A;    INJECTION OF ANESTHETIC AGENT AROUND MEDIAL BRANCH NERVES INNERVATING LUMBAR FACET JOINT Bilateral 10/12/2020    Procedure: Bilateral L3-5 MBB;  Surgeon: Tacho Trivedi MD;  Location: [facility];  Service: Pain Management;  Laterality: Bilateral;    INJECTION OF ANESTHETIC AGENT AROUND MEDIAL BRANCH NERVES INNERVATING LUMBAR FACET JOINT Bilateral 12/21/2020    Procedure: Bilateral L3-5 MBB with steroid;  Surgeon: Tacho Trivedi MD;  Location: [facility];  Service: Pain Management;  Laterality: Bilateral;    INTRALUMINAL GASTROINTESTINAL TRACT IMAGING VIA CAPSULE N/A 12/29/2021    Procedure: IMAGING PROCEDURE, GI TRACT, INTRALUMINAL, VIA CAPSULE;  Surgeon: Tahir Geronimo RN;  Location: [facility];  Service: Endoscopy;  Laterality: N/A;    PROSTATE SURGERY      prostate radiation    RADIOFREQUENCY THERMOCOAGULATION Left 6/4/2021    Procedure: Left L3-5 Lumbar RFA;  Surgeon: Tacho Trivedi MD;  Location: [facility];  Service: Pain Management;  Laterality: Left;    RADIOFREQUENCY THERMOCOAGULATION Right 6/18/2021    Procedure: Right L3-5 Lumbar RFA;  Surgeon: Tacho Trivedi MD;  [facility];  Service: Pain Management;  Laterality: Right;    REPLACEMENT OF PACEMAKER GENERATOR Left 6/16/2022    Procedure: REPLACEMENT,  PULSE GENERATOR, CARDIAC PACEMAKER/CRT-P device;  Surgeon: Darrel Carrero MD;  Location: [facility] CATH LAB;  Service: Cardiology;  Laterality: Left;  NOT MRI safe   Pacer and leads implanted 9/30/2017, Dr. Souleymane CARROLLT Consulta CRT-P C4TR01, [id number]   A lead: Mdt 5076 CapSureFix® Novus, [id number]   RV lead: Mdt 5076 CapSureFix® Novus, [id number]   LV lead: Mdt 4196 At    SELECTIVE INJECTION OF ANESTHETIC AGENT AROUND LUMBAR SPINAL NERVE ROOT BY TRANSFORAMINAL APPROACH Bilateral 6/8/2022    Procedure: Bilateral L3/4 TF XUAN with RN IV sedation;  Surgeon: Philipp Demarco MD;  Location: [facility];  Service: Pain Management;  Laterality: Bilateral;    SPINE SURGERY      fusion    TONSILLECTOMY         Review of Systems   Constitutional:  Negative for activity change, appetite change, chills, diaphoresis, fatigue, fever and unexpected weight change.   HENT:  Negative for congestion, nosebleeds and rhinorrhea.    Respiratory:  Negative for cough and shortness of breath.    Cardiovascular:  Negative for chest pain and leg swelling.   Gastrointestinal:  Negative for abdominal pain, anal bleeding, blood in stool, constipation, diarrhea, nausea and vomiting.   Genitourinary:  Negative for hematuria.   Musculoskeletal:  Positive for arthralgias and back pain. Negative for myalgias.   Skin:  Negative for color change and pallor.   Neurological:  Negative for dizziness, weakness, light-headedness, numbness and headaches.       Medication List with Changes/Refills   Current Medications    ACETAMINOPHEN (TYLENOL) 500 MG TABLET    Take 2 tablets (1,000 mg total) by mouth every 6 (six) hours as needed for Pain.    APIXABAN (ELIQUIS) 2.5 MG TAB    Take 1 tablet (2.5 mg total) by mouth 2 (two) times daily.    ATORVASTATIN (LIPITOR) 80 MG TABLET    Take 1 tablet (80 mg total) by mouth every evening.    BLOOD SUGAR DIAGNOSTIC STRP    Check blood glucose levels daily in the AM fasting and 1-2 times more daily.    CEFADROXIL  (DURICEF) 500 MG CAP    Take 1 capsule (500 mg total) by mouth every 12 (twelve) hours.    CHOLECALCIFEROL, VITAMIN D3, (VITAMIN D3) 25 MCG (1,000 UNIT) CAPSULE    Take 1,000 Units by mouth once daily.    CLONAZEPAM (KLONOPIN) 1 MG TABLET    Take 1 tablet (1 mg total) by mouth nightly as needed for Anxiety.    FLUTICASONE PROPIONATE (FLONASE) 50 MCG/ACTUATION NASAL SPRAY    USE 2 SPRAYS IN EACH NOSTRIL EVERY DAY    FUROSEMIDE (LASIX) 40 MG TABLET    Take 1 tablet (40 mg total) by mouth 2 (two) times daily. Can double to 2 tablets twice a day for 3 days for more swelling/fluid build up - take 80mg twice a day    KRILL/OM-3/DHA/EPA/PHOSPHO/AST (MEGARED OMEGA-3 KRILL OIL ORAL)    Take 1 capsule by mouth every morning.    LANCETS MISC    Check blood glucose levels daily in the AM fasting and 1-2 times more daily.    LEVOCETIRIZINE (XYZAL) 5 MG TABLET    TAKE 1 TABLET EVERY EVENING    LOSARTAN (COZAAR) 50 MG TABLET    Take 1 tablet (50 mg total) by mouth once daily.    MAGNESIUM OXIDE (MAG-OX) 400 MG (241.3 MG MAGNESIUM) TABLET    Take 1 tablet (400 mg total) by mouth once daily.    METHYLPREDNISOLONE (MEDROL DOSEPACK) 4 MG TABLET    use as directed    MULTIVITAMIN CAPSULE    Take 1 capsule by mouth once daily.    PANTOPRAZOLE (PROTONIX) 40 MG TABLET    Take 1 tablet (40 mg total) by mouth once daily.    POTASSIUM CHLORIDE SA (K-DUR,KLOR-CON) 20 MEQ TABLET    Take 1 tablet (20 mEq total) by mouth once daily.    PYRIDOXINE, VITAMIN B6, (B-6) 100 MG TAB    Take 50 mg by mouth once daily.    SPIRONOLACTONE (ALDACTONE) 25 MG TABLET    Take 1 tablet (25 mg total) by mouth once daily.    VIT A/VIT C/VIT E/ZINC/COPPER (OCUVITE PRESERVISION ORAL)    Take 1 capsule by mouth every evening.     Objective:   There were no vitals filed for this visit.    Physical Exam  Constitutional:       General: He is not in acute distress.     Appearance: He is not ill-appearing, toxic-appearing or diaphoretic.   Neurological:      Mental  Status: He is alert.   Psychiatric:         Mood and Affect: Mood normal.        Physical exam limited due to video visit    Labs/Results:  Lab Results   Component Value Date    WBC 3.67 (L) 10/05/2022    RBC 4.09 (L) 10/05/2022    HGB 11.0 (L) 10/05/2022    HCT 37.6 (L) 10/05/2022    MCV 92 10/05/2022    MCH 26.9 (L) 10/05/2022    MCHC 29.3 (L) 10/05/2022    RDW 28.4 (H) 10/05/2022     10/05/2022    MPV 8.9 (L) 10/05/2022    GRAN 2.5 10/05/2022    GRAN 67.9 10/05/2022    LYMPH 0.5 (L) 10/05/2022    LYMPH 14.4 (L) 10/05/2022    MONO 0.5 10/05/2022    MONO 12.5 10/05/2022    EOS 0.1 10/05/2022    BASO 0.06 10/05/2022    EOSINOPHIL 3.3 10/05/2022    BASOPHIL 1.6 10/05/2022     CMP  Sodium   Date Value Ref Range Status   10/05/2022 139 136 - 145 mmol/L Final     Potassium   Date Value Ref Range Status   10/05/2022 4.5 3.5 - 5.1 mmol/L Final     Chloride   Date Value Ref Range Status   10/05/2022 111 (H) 95 - 110 mmol/L Final     CO2   Date Value Ref Range Status   10/05/2022 18 (L) 23 - 29 mmol/L Final     Glucose   Date Value Ref Range Status   10/05/2022 101 70 - 110 mg/dL Final     BUN   Date Value Ref Range Status   10/05/2022 32 (H) 8 - 23 mg/dL Final     Creatinine   Date Value Ref Range Status   10/05/2022 1.8 (H) 0.5 - 1.4 mg/dL Final     Calcium   Date Value Ref Range Status   10/05/2022 8.5 (L) 8.7 - 10.5 mg/dL Final     Total Protein   Date Value Ref Range Status   10/05/2022 7.3 6.0 - 8.4 g/dL Final     Albumin   Date Value Ref Range Status   10/05/2022 4.1 3.5 - 5.2 g/dL Final     Total Bilirubin   Date Value Ref Range Status   10/05/2022 0.4 0.1 - 1.0 mg/dL Final     Comment:     For infants and newborns, interpretation of results should be based  on gestational age, weight and in agreement with clinical  observations.    Premature Infant recommended reference ranges:  Up to 24 hours.............<8.0 mg/dL  Up to 48 hours............<12.0 mg/dL  3-5 days..................<15.0 mg/dL  6-29  days.................<15.0 mg/dL       Alkaline Phosphatase   Date Value Ref Range Status   10/05/2022 73 55 - 135 U/L Final     AST   Date Value Ref Range Status   10/05/2022 21 10 - 40 U/L Final     ALT   Date Value Ref Range Status   10/05/2022 12 10 - 44 U/L Final     Anion Gap   Date Value Ref Range Status   10/05/2022 10 8 - 16 mmol/L Final     eGFR if    Date Value Ref Range Status   07/14/2022 50 (A) >60 mL/min/1.73 m^2 Final     eGFR if non    Date Value Ref Range Status   07/14/2022 43 (A) >60 mL/min/1.73 m^2 Final     Comment:     Calculation used to obtain the estimated glomerular filtration  rate (eGFR) is the CKD-EPI equation.           Latest Reference Range & Units 08/12/22 12:59   Iron 45 - 160 ug/dL 13 (L)   TIBC 250 - 450 ug/dL 419   Saturated Iron 20 - 50 % 3 (L)   Transferrin 200 - 375 mg/dL 283   Ferritin 20.0 - 300.0 ng/mL 13 (L)     Assessment:     Problem List Items Addressed This Visit          Oncology    Adenocarcinoma of prostate    Iron deficiency anemia due to chronic blood loss - Primary    Relevant Orders    CBC Auto Differential    CBC Auto Differential    Comprehensive Metabolic Panel    Ferritin    Iron and TIBC    Anemia associated with stage 4 chronic renal failure    Relevant Orders    CBC Auto Differential    CBC Auto Differential    Comprehensive Metabolic Panel    Ferritin    Iron and TIBC    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    Relevant Orders    CBC Auto Differential    CBC Auto Differential    Comprehensive Metabolic Panel    Ferritin    Iron and TIBC     Plan:     Anemia associated with stage 4 chronic renal failure  --anemia likely multifactorial  --epo 40kU weekly as needed for hgb10g/dL  --hgb: 11  --possible need for bone marrow biopsy if he continues to be transfusion dependent and hx of prostate cancer.     Adenocarcinoma of prostate  --PSA <0.01     Iron deficiency anemia due to chronic blood loss  --he is s/p IV iron  injectafer, last one 9/22/22  --Colonoscopy in 9/2020 showed internal hemorhhoids and small polyps- recommedned repeat in 5 years.  -- EGD in 9/2020 showed some gastritis.   --VCE in 12/2021 which was normal.  --patient is not agreeable at this time for repeat egd and colonoscopy-following with gastro  --hgb is much improved since IV iron-11. Has not been above 11 in a long time.    Follow-Up: cbc in 2 weeks with possible epo.1 month with cbc with possible epo then 6 weeks with cbc cmp iron/tibc ferritin-RTC with possible epo    Mary Jane Allen PA-C  Hematology Oncology    The patient location is: [facility]  The chief complaint leading to consultation is: anemia     Visit type:  Synchronous audio video      Face to Face time with patient: 25 minutes of total time spent on the encounter, which includes face to face time and non-face to face time preparing to see the patient (eg, review of tests), Obtaining and/or reviewing separately obtained history, Documenting clinical information in the electronic or other health record, Independently interpreting results (not separately reported) and communicating results to the patient/family/caregiver, or Care coordination (not separately reported).      Each patient to whom he or she provides medical services by telemedicine is:  (1) informed of the relationship between the provider and patient and the respective role of any other health care provider with respect to management of the patient; and (2) notified that he or she may decline to receive medical services

## 2022-10-12 NOTE — PROGRESS NOTES
Subjective:    Patient ID:  Jake Ortiz is a 81 y.o. male who presents for evaluation of Dizziness (Pt has a device. 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 He has recent GI bleeding events 9/2020, 11/2020 and 12/21 requiring admission and PRBC transfusion. Endoscopy has been unable to identify a culprit. He takes eliquis, now on 2.5 mg bid, for CVA prophylaxis. He has a CRT-D with underlying rhythm: AF, CHB and V escape in the 30-40s    Primary cardiologist: Dr Rich    CHADSVASC of at least 5  HASBLED of at least 4    Echo 6/2020:  · Concentric left ventricular hypertrophy.  · Left ventricular systolic function. The estimated ejection fraction is 50%.  · Indeterminate left ventricular diastolic function.  · Severe left atrial enlargement.  · Mild-to-moderate mitral regurgitation.  · Mild to moderate tricuspid regurgitation.    Past Medical History:  No date: Abnormal PFT  No date: Anemia  No date: Arthritis  10/19/2017: Atrial fibrillation  No date: Back pain  3/5/2018: Congestive heart failure  No date: Coronary artery disease  2018: DM (diabetes mellitus)      Comment:   am 06/23/2020 2016: DM (diabetes mellitus)      Comment:   am 08/17/2021  No date: Hyperlipemia  No date: Hypertension  No date: Myocardial infarction  No date: Obesity  6/5/2018: NASREEN (obstructive sleep apnea)  2015: Prostate cancer  No date: Tobacco dependence  No date: Type 2 diabetes mellitus    Past Surgical History:  9/22/2020: COLONOSCOPY; N/A      Comment:  Procedure: COLONOSCOPY;  Surgeon: Javier Farmer MD;  Location: [facility];  Service: Endoscopy;               Laterality: N/A;  1987: CORONARY ARTERY BYPASS GRAFT  11/22/2019: EPIDURAL STEROID INJECTION; N/A      Comment:  Procedure: Lumbar L5/S1  IL XUAN;  Surgeon: Tacho Trivedi MD;  Location: [facility];  Service: Pain                Management;  Laterality: N/A;  1/6/2020: EPIDURAL STEROID INJECTION; N/A      Comment:  Procedure: Lumbar L5/S1 IL XUAN;  Surgeon: Tacho Trivedi MD;  Location: [facility];  Service: Pain                Management;  Laterality: N/A;  2/10/2020: EPIDURAL STEROID INJECTION; N/A      Comment:  Procedure: Caudal XUAN;  Surgeon: Tacho Trivedi MD;                 Location: [facility];  Service: Pain Management;                 Laterality: N/A;  9/22/2020: ESOPHAGOGASTRODUODENOSCOPY; N/A      Comment:  Procedure: EGD (ESOPHAGOGASTRODUODENOSCOPY);  Surgeon:                Javier Farmer MD;  Location: [facility] ENDO;                 Service: Endoscopy;  Laterality: N/A;  10/12/2020: INJECTION OF ANESTHETIC AGENT AROUND MEDIAL BRANCH NERVES   INNERVATING LUMBAR FACET JOINT; Bilateral      Comment:  Procedure: Bilateral L3-5 MBB;  Surgeon: Tacho Trivedi MD;  Location: [facility];  Service: Pain                Management;  Laterality: Bilateral;  12/21/2020: INJECTION OF ANESTHETIC AGENT AROUND MEDIAL BRANCH NERVES   INNERVATING LUMBAR FACET JOINT; Bilateral      Comment:  Procedure: Bilateral L3-5 MBB with steroid;  Surgeon:                Tacho Trivedi MD;  Location: [facility];  Service:               Pain Management;  Laterality: Bilateral;  12/29/2021: INTRALUMINAL GASTROINTESTINAL TRACT IMAGING VIA CAPSULE;   N/A      Comment:  Procedure: IMAGING PROCEDURE, GI TRACT, INTRALUMINAL,                VIA CAPSULE;  Surgeon: Tahir Geronimo RN;  Location: [facility]                ENDO;  Service: Endoscopy;  Laterality: N/A;  No date: PROSTATE SURGERY      Comment:  prostate radiation  6/4/2021: RADIOFREQUENCY THERMOCOAGULATION; Left      Comment:  Procedure: Left L3-5 Lumbar RFA;  Surgeon: Tacho Trivedi MD;  Location: [facility];  Service: Pain                 Management;  Laterality: Left;  2021: RADIOFREQUENCY THERMOCOAGULATION; Right      Comment:  Procedure: Right L3-5 Lumbar RFA;  Surgeon: Tacho Trivedi MD;  Location: [facility] PAIN T;  Service: Pain                Management;  Laterality: Right;  No date: SPINE SURGERY      Comment:  fusion  No date: TONSILLECTOMY    Social History    Socioeconomic History      Marital status:       Number of children: 0    Occupational History      Occupation: retired        Employer: [employer]    Tobacco Use      Smoking status: Former Smoker        Packs/day: 1.50        Years: 20.00        Pack years: 30        Types: Cigarettes        Start date:         Quit date:         Years since quittin.0      Smokeless tobacco: Never Used    Substance and Sexual Activity      Alcohol use: No      Drug use: No      Sexual activity: Not Currently    Social Determinants of Health  Financial Resource Strain: Low Risk       Difficulty of Paying Living Expenses: Not hard at all  Food Insecurity: No Food Insecurity      Worried About Running Out of Food in the Last Year: Never true      Ran Out of Food in the Last Year: Never true  Transportation Needs: No Transportation Needs      Lack of Transportation (Medical): No      Lack of Transportation (Non-Medical): No  Physical Activity: Inactive      Days of Exercise per Week: 0 days      Minutes of Exercise per Session: 0 min  Stress: No Stress Concern Present      Feeling of Stress : Not at all  Social Connections: Moderately Integrated      Frequency of Communication with Friends and Family: More than three times a week      Frequency of Social Gatherings with Friends and Family: More than three times a week      Attends Denominational Services: Never      Active Member of Clubs or Organizations: Yes      Attends Club or Organization Meetings: More than 4 times per year      Marital Status:   Housing Stability: Low Risk        Unable to Pay for Housing in the Last Year: No      Number of Places Lived in the Last Year: 1      Unstable Housing in the Last Year: No    Review of patient's family history indicates:  Problem: Heart attack      Relation: Mother          Age of Onset: (Not Specified)  Problem: Diabetes      Relation: Mother          Age of Onset: (Not Specified)  Problem: Heart disease      Relation: Mother          Age of Onset: (Not Specified)  Problem: Cataracts      Relation: Mother          Age of Onset: (Not Specified)  Problem: Stroke      Relation: Father          Age of Onset: (Not Specified)  Problem: Heart disease      Relation: Father          Age of Onset: (Not Specified)  Problem: Heart disease      Relation: Brother          Age of Onset: (Not Specified)      Current Outpatient Medications:  acetaminophen (TYLENOL) 500 MG tablet, Take 500 mg by mouth every 6 (six) hours as needed for Pain., Disp: , Rfl:   albuterol (ACCUNEB) 1.25 mg/3 mL Nebu, Take 3 mLs (1.25 mg total) by nebulization every 6 (six) hours as needed (cough and SOB). Rescue, Disp: 1 Box, Rfl: 0  apixaban (ELIQUIS) 2.5 mg Tab, Take 1 tablet (2.5 mg total) by mouth 2 (two) times daily., Disp: 180 tablet, Rfl: 1  aspirin 81 MG Chew, Take 1 tablet (81 mg total) by mouth once daily., Disp: 30 tablet, Rfl: 0  atorvastatin (LIPITOR) 80 MG tablet, One tablet daily, Disp: 90 tablet, Rfl: 1  blood sugar diagnostic Strp, Check blood glucose levels daily in the AM fasting and 1-2 times more daily., Disp: 300 strip, Rfl: 3  blood-glucose meter kit, Use as instructed, Disp: 1 each, Rfl: 0  cholecalciferol, vitamin D3, (VITAMIN D3) 25 mcg (1,000 unit) capsule, Take 5,000 Units by mouth once daily., Disp: , Rfl:   clonazePAM (KLONOPIN) 1 MG tablet, Take 1 tablet (1 mg total) by mouth nightly as needed for Anxiety., Disp: 90 tablet, Rfl: 0  ferrous sulfate (FEOSOL) 325 mg (65 mg iron) Tab tablet, Take 325 mg by mouth daily with breakfast., Disp: , Rfl:   fluticasone  propionate (FLONASE) 50 mcg/actuation nasal spray, 2 sprays (100 mcg total) by Each Nostril route once daily., Disp: 48 g, Rfl: 5  furosemide (LASIX) 20 MG tablet, Take 1 tablet (20 mg total) by mouth 2 (two) times daily. Can double to 2 tablets twice a day for 3 days for more swelling/fluid build up, Disp: 60 tablet, Rfl: 11  lancets Misc, Check blood glucose levels daily in the AM fasting and 1-2 times more daily., Disp: 300 each, Rfl: 3  levocetirizine (XYZAL) 5 MG tablet, Take 1 tablet (5 mg total) by mouth every evening., Disp: 90 tablet, Rfl: 1  losartan (COZAAR) 50 MG tablet, Take 1 tablet (50 mg total) by mouth once daily., Disp: 90 tablet, Rfl: 3  magnesium oxide (MAG-OX) 400 mg (241.3 mg magnesium) tablet, Take 1 tablet (400 mg total) by mouth once daily., Disp: 90 tablet, Rfl: 1  multivitamin capsule, Take 1 capsule by mouth once daily., Disp: , Rfl:   pantoprazole (PROTONIX) 40 MG tablet, Take 1 tablet (40 mg total) by mouth once daily., Disp: 90 tablet, Rfl: 3  potassium chloride SA (K-DUR,KLOR-CON) 20 MEQ tablet, Take 1 tablet (20 mEq total) by mouth once daily., Disp: 90 tablet, Rfl: 1  pregabalin (LYRICA) 75 MG capsule, Take 1 capsule, 75 mg, once daily for 1 week.  Followed by take 1 capsule 75 mg twice daily for 1 week.  Followed by 2 capsules, 150 mg in the morning and 75 mg at night for 1 week.  Followed by 150 mg twice daily for 1 week.  Followed by 225 mg in the morning and 150 mg in the evening for 1 week.  Followed by 225 mg b.i.d.., Disp: 147 capsule, Rfl: 0  spironolactone (ALDACTONE) 25 MG tablet, Take 1 tablet (25 mg total) by mouth once daily., Disp: 90 tablet, Rfl: 1    No current facility-administered medications for this visit.  Facility-Administered Medications Ordered in Other Visits:  ondansetron injection 4 mg, 4 mg, Intravenous, Once PRN, Tacho Trivedi MD            Review of Systems   Constitutional: Positive for malaise/fatigue and weight gain. Negative for decreased  appetite and weight loss.   Eyes: Negative for blurred vision.   Cardiovascular: Positive for dyspnea on exertion and irregular heartbeat. Negative for chest pain, claudication, cyanosis, leg swelling, near-syncope, orthopnea and palpitations.   Respiratory: Negative for cough, shortness of breath, sleep disturbances due to breathing, snoring and wheezing.    Endocrine: Negative for heat intolerance.   Hematologic/Lymphatic: Does not bruise/bleed easily.   Musculoskeletal: Positive for arthritis, back pain and muscle weakness. Negative for myalgias.   Gastrointestinal: Negative for melena, nausea and vomiting.   Genitourinary: Positive for frequency and nocturia.   Neurological: Positive for excessive daytime sleepiness, light-headedness and weakness. Negative for dizziness and headaches.   Psychiatric/Behavioral: Negative for depression, memory loss and substance abuse. The patient does not have insomnia and is not nervous/anxious.         Objective:    Physical Exam  Vitals and nursing note reviewed.   Constitutional:       General: He is not in acute distress.     Appearance: He is well-developed. He is not diaphoretic.   HENT:      Head: Normocephalic and atraumatic.      Nose: Nose normal.   Eyes:      General: No scleral icterus.        Right eye: No discharge.         Left eye: No discharge.      Conjunctiva/sclera: Conjunctivae normal.   Neck:      Thyroid: No thyromegaly.      Vascular: No JVD.   Cardiovascular:      Rate and Rhythm: Normal rate and regular rhythm.      Heart sounds: S1 normal and S2 normal. Murmur heard.   No friction rub. No gallop. No S3 or S4 sounds.    Pulmonary:      Effort: Pulmonary effort is normal. No respiratory distress.      Breath sounds: Normal breath sounds. No stridor. No wheezing or rales.   Chest:      Chest wall: No tenderness.   Abdominal:      General: Bowel sounds are normal. There is no distension.      Palpations: Abdomen is soft. There is no mass.      Tenderness:  There is no abdominal tenderness. There is no rebound.   Genitourinary:     Comments: Deferred  Musculoskeletal:         General: No tenderness or deformity. Normal range of motion.      Cervical back: Normal range of motion and neck supple.   Lymphadenopathy:      Cervical: No cervical adenopathy.   Skin:     General: Skin is warm and dry.      Coloration: Skin is not pale.      Findings: No erythema or rash.   Neurological:      Mental Status: He is alert and oriented to person, place, and time.      Motor: No abnormal muscle tone.      Coordination: Coordination normal.   Psychiatric:         Behavior: Behavior normal.         Thought Content: Thought content normal.         Judgment: Judgment normal.           Assessment:       1. S/P CABG x 3    2. PAF (paroxysmal atrial fibrillation)         Plan:       81 yoM permanent AF, CRT-P, here for LAAO consideration. The patient can tolerate short term OAC but is not a candidate for long term OAC due to recurrent bleeding issues. I discussed management options regarding LAAO. I discussed the process of LAAO via Watchman including pre-procedure testing  as well as the need to take OAC for 6 weeks post implant. We discussed post procedure ANGELIC studies to assess LILIAN occlusion. Additionally I mentioned the need to take DAPT up to the 6 month point post implant.      Watchman with Anesthesia support               no

## 2022-10-14 NOTE — PROGRESS NOTES
Subjective:       Patient ID: Jake Ortiz is a 82 y.o. male.    Chief Complaint: No chief complaint on file.      HPI Comments:       Current Outpatient Medications:     acetaminophen (TYLENOL) 500 MG tablet, Take 2 tablets (1,000 mg total) by mouth every 6 (six) hours as needed for Pain., Disp: , Rfl: 0    apixaban (ELIQUIS) 2.5 mg Tab, Take 1 tablet (2.5 mg total) by mouth 2 (two) times daily., Disp: 180 tablet, Rfl: 1    atorvastatin (LIPITOR) 80 MG tablet, Take 1 tablet (80 mg total) by mouth every evening., Disp: 90 tablet, Rfl: 1    blood sugar diagnostic Strp, Check blood glucose levels daily in the AM fasting and 1-2 times more daily., Disp: 300 strip, Rfl: 3    cefadroxil (DURICEF) 500 MG Cap, Take 1 capsule (500 mg total) by mouth every 12 (twelve) hours., Disp: 6 capsule, Rfl: 0    cholecalciferol, vitamin D3, (VITAMIN D3) 25 mcg (1,000 unit) capsule, Take 1,000 Units by mouth once daily., Disp: , Rfl:     clonazePAM (KLONOPIN) 1 MG tablet, Take 1 tablet (1 mg total) by mouth nightly as needed for Anxiety., Disp: 90 tablet, Rfl: 0    fluticasone propionate (FLONASE) 50 mcg/actuation nasal spray, USE 2 SPRAYS IN EACH NOSTRIL EVERY DAY, Disp: 48 g, Rfl: 5    furosemide (LASIX) 40 MG tablet, Take 1 tablet (40 mg total) by mouth 2 (two) times daily. Can double to 2 tablets twice a day for 3 days for more swelling/fluid build up - take 80mg twice a day, Disp: 90 tablet, Rfl: 1    krill/om-3/dha/epa/phospho/ast (MEGARED OMEGA-3 KRILL OIL ORAL), Take 1 capsule by mouth every morning., Disp: , Rfl:     lancets Misc, Check blood glucose levels daily in the AM fasting and 1-2 times more daily., Disp: 300 each, Rfl: 3    levocetirizine (XYZAL) 5 MG tablet, TAKE 1 TABLET EVERY EVENING, Disp: 90 tablet, Rfl: 1    losartan (COZAAR) 50 MG tablet, Take 1 tablet (50 mg total) by mouth once daily., Disp: 90 tablet, Rfl: 3    magnesium oxide (MAG-OX) 400 mg (241.3 mg magnesium) tablet, Take 1 tablet (400 mg total) by  mouth once daily., Disp: 90 tablet, Rfl: 1    methylPREDNISolone (MEDROL DOSEPACK) 4 mg tablet, use as directed, Disp: 1 each, Rfl: 0    multivitamin capsule, Take 1 capsule by mouth once daily., Disp: , Rfl:     pantoprazole (PROTONIX) 40 MG tablet, Take 1 tablet (40 mg total) by mouth once daily., Disp: 90 tablet, Rfl: 3    potassium chloride SA (K-DUR,KLOR-CON) 20 MEQ tablet, Take 1 tablet (20 mEq total) by mouth once daily., Disp: 90 tablet, Rfl: 1    pyridoxine, vitamin B6, (B-6) 100 MG Tab, Take 50 mg by mouth once daily., Disp: , Rfl:     spironolactone (ALDACTONE) 25 MG tablet, Take 1 tablet (25 mg total) by mouth once daily., Disp: 90 tablet, Rfl: 3    vit A/vit C/vit E/zinc/copper (OCUVITE PRESERVISION ORAL), Take 1 capsule by mouth every evening., Disp: , Rfl:   No current facility-administered medications for this visit.    Facility-Administered Medications Ordered in Other Visits:     ondansetron injection 4 mg, 4 mg, Intravenous, Once PRN, Tacho Trivedi MD      Needs refill on Klonopin.  Uses at bedtime.  Helps him sleep.  However his urination get some up at night.  Some grogginess.  Worried about falls.  Takes Lasix  b.i.d. per Cardiology     Also complains of fecal urgency within 30 minutes of every meal.  Somewhat loose stool.    Back Pain  This is a chronic problem. The current episode started more than 1 year ago. The problem occurs constantly. The problem has been gradually worsening since onset. The pain is present in the sacro-iliac. The quality of the pain is described as aching and stabbing. The pain does not radiate. The pain is at a severity of 9/10. The pain is severe. The pain is The same all the time. The symptoms are aggravated by bending, standing and twisting. Stiffness is present All day. Associated symptoms include bowel incontinence, pelvic pain and weakness. Pertinent negatives include no abdominal pain, bladder incontinence, chest pain, dysuria, fever, headaches, leg pain,  "numbness, paresis, paresthesias, perianal numbness, tingling or weight loss. Risk factors include lack of exercise. The treatment provided no relief.   Review of Systems   Constitutional:  Negative for fever and weight loss.   Cardiovascular:  Negative for chest pain.   Gastrointestinal:  Positive for bowel incontinence. Negative for abdominal pain.   Genitourinary:  Positive for pelvic pain. Negative for bladder incontinence, dysuria and hematuria.   Musculoskeletal:  Positive for back pain.   Neurological:  Positive for weakness. Negative for tingling, numbness, headaches and paresthesias.     Objective:      Vitals:    10/14/22 0837   BP: 130/62   Pulse: 81   Temp: 97.7 °F (36.5 °C)   SpO2: 96%   Weight: 85.7 kg (188 lb 15 oz)   Height: 5' 9" (1.753 m)   PainSc:   5   PainLoc: Back     Physical Exam  Vitals and nursing note reviewed.   Constitutional:       General: He is not in acute distress.     Appearance: He is well-developed. He is not diaphoretic.   HENT:      Head: Normocephalic.   Neck:      Thyroid: No thyromegaly.   Cardiovascular:      Rate and Rhythm: Normal rate and regular rhythm.      Heart sounds: Normal heart sounds. No murmur heard.  Pulmonary:      Effort: Pulmonary effort is normal.      Breath sounds: Normal breath sounds. No wheezing or rales.   Abdominal:      General: There is no distension.      Palpations: Abdomen is soft.   Musculoskeletal:      Cervical back: Neck supple.   Lymphadenopathy:      Cervical: No cervical adenopathy.   Skin:     General: Skin is warm and dry.   Neurological:      Mental Status: He is alert and oriented to person, place, and time.   Psychiatric:         Mood and Affect: Mood normal.         Behavior: Behavior normal.         Thought Content: Thought content normal.         Judgment: Judgment normal.       Assessment:       1. Type 2 diabetes mellitus without complication, without long-term current use of insulin    2. DDD (degenerative disc disease), lumbar "    3. Lumbar radiculopathy    4. Periodic limb movement disorder (PLMD)    5. Stage 4 chronic kidney disease    6. Anemia associated with stage 4 chronic renal failure    7. Fecal urgency          Plan:   Type 2 diabetes mellitus without complication, without long-term current use of insulin  Comments:  A1c today.  Anemia will affect his results  Orders:  -     Hemoglobin A1C; Future; Expected date: 10/14/2022    DDD (degenerative disc disease), lumbar  Comments:  Discussions about spinal cord stimulator.  Encouraged him to continue communicating with Dr. Demarco about his pain    Lumbar radiculopathy  Comments:  Followed by pain management    Periodic limb movement disorder (PLMD)  Comments:  Ninety day supply of Klonopin.   review okay  Orders:  -     clonazePAM (KLONOPIN) 1 MG tablet; Take 1 tablet (1 mg total) by mouth nightly as needed for Anxiety.  Dispense: 90 tablet; Refill: 0    Stage 4 chronic kidney disease  Comments:  Stable.  Creatinine 1.8    Anemia associated with stage 4 chronic renal failure  Comments:  Current H&H 11/37    Fecal urgency  Comments:  GI consult  Orders:  -     Ambulatory referral/consult to Gastroenterology; Future; Expected date: 10/21/2022

## 2022-10-18 NOTE — PROGRESS NOTES
Subjective:       Patient ID: Jake Ortiz is a 82 y.o. male.    Chief Complaint: Diarrhea    The patient who is known to our service from previous encounters is here for follow-up of his process in 2020 and 2021. He had a Colonoscopy and EGD in 2020 and a VCE in 12/2021. This was done for Anemia workup for iron deficiency and it was negative. He has a Hgb now up to 11. G.     He also has complaint of urgency of stool. If he has to have a BM he has to go immediately. There is no abdominal pain no nausea or vomiting. There has been no BRBPR or melena. His appetite is good with no weight loss. Diarrhea issues have been going on for 2 months. There has been no use of NSAIDs.     Review of Systems   Constitutional: Negative.  Negative for activity change, appetite change, chills, diaphoresis, fatigue, fever and unexpected weight change.   HENT:  Negative for congestion, ear discharge, facial swelling, hearing loss, nosebleeds, postnasal drip, sinus pressure, sneezing, tinnitus, trouble swallowing and voice change.    Eyes:  Negative for photophobia, redness and visual disturbance.   Respiratory:  Negative for cough, chest tightness, shortness of breath and wheezing.    Cardiovascular:  Negative for chest pain and palpitations.   Gastrointestinal:  Positive for diarrhea. Negative for abdominal distention, abdominal pain, blood in stool, constipation, nausea, rectal pain and vomiting.        Gas   Genitourinary:  Negative for difficulty urinating, dysuria, flank pain, frequency, hematuria, penile discharge, scrotal swelling, testicular pain and urgency.   Musculoskeletal:  Negative for arthralgias, back pain, gait problem, joint swelling, myalgias and neck stiffness.   Skin:  Negative for color change, pallor, rash and wound.   Neurological:  Negative for dizziness, tremors, seizures, syncope, facial asymmetry, speech difficulty, weakness, light-headedness, numbness and headaches.   Hematological:  Negative for  adenopathy. Does not bruise/bleed easily.   Psychiatric/Behavioral:  Negative for agitation, confusion, hallucinations, sleep disturbance and suicidal ideas.      Objective:      Physical Exam  Vitals reviewed.   Constitutional:       General: He is not in acute distress.     Appearance: He is well-developed. He is not diaphoretic.   HENT:      Head: Normocephalic and atraumatic.      Nose: Nose normal.      Mouth/Throat:      Pharynx: No oropharyngeal exudate.   Eyes:      General: No scleral icterus.     Conjunctiva/sclera: Conjunctivae normal.      Pupils: Pupils are equal, round, and reactive to light.   Neck:      Thyroid: No thyromegaly.   Cardiovascular:      Rate and Rhythm: Normal rate and regular rhythm.      Heart sounds: No murmur heard.    No friction rub. No gallop.   Pulmonary:      Effort: Pulmonary effort is normal. No respiratory distress.      Breath sounds: Normal breath sounds. No wheezing or rales.   Abdominal:      General: Bowel sounds are normal. There is no distension.      Palpations: Abdomen is soft. There is no mass.      Tenderness: There is no abdominal tenderness. There is no guarding or rebound.   Musculoskeletal:         General: No tenderness.      Cervical back: Normal range of motion and neck supple.   Lymphadenopathy:      Cervical: No cervical adenopathy.   Skin:     General: Skin is warm.      Findings: No rash.   Neurological:      Mental Status: He is alert and oriented to person, place, and time.      Motor: No abnormal muscle tone.      Coordination: Coordination normal.   Psychiatric:         Behavior: Behavior normal.         Thought Content: Thought content normal.         Judgment: Judgment normal.       Assessment:   Diarrhea in adult patient  -     WBC, Stool; Future; Expected date: 10/18/2022  -     Occult blood x 1, stool; Future; Expected date: 10/18/2022  -     Fecal fat, qualitative; Future; Expected date: 10/18/2022  -     Stool Exam-Ova,Cysts,Parasites;  Future; Expected date: 10/18/2022  -     Stool culture; Future; Expected date: 10/18/2022  -     Giardia / Cryptosporidum, EIA; Future; Expected date: 10/18/2022  -     Calprotectin, Stool; Future; Expected date: 10/18/2022  -     Pancreatic elastase, fecal; Future; Expected date: 10/18/2022    Fecal urgency  Comments:  GI consult  Orders:  -     Ambulatory referral/consult to Gastroenterology  -     WBC, Stool; Future; Expected date: 10/18/2022  -     Occult blood x 1, stool; Future; Expected date: 10/18/2022  -     Fecal fat, qualitative; Future; Expected date: 10/18/2022  -     Stool Exam-Ova,Cysts,Parasites; Future; Expected date: 10/18/2022  -     Stool culture; Future; Expected date: 10/18/2022  -     Giardia / Cryptosporidum, EIA; Future; Expected date: 10/18/2022  -     Calprotectin, Stool; Future; Expected date: 10/18/2022  -     Pancreatic elastase, fecal; Future; Expected date: 10/18/2022         Plan:   As above.

## 2022-10-20 NOTE — PROGRESS NOTES
No epo given today; Hgb 12.3; CARMELINA Osorio, made aware via staff message. Patient uses [facility] and has future appts.

## 2022-10-27 NOTE — PROGRESS NOTES
Subjective:   Patient ID:  Jake Ortiz is a 82 y.o. male who presents for cardiac consult of No chief complaint on file.        The patient came in today for cardiac consult of No chief complaint on file.    Jake Ortiz is a 82 y.o. male pt with CAD s/p CABG, old MI, CHF EF 35% with TR/MR, AVB s/p CRT, PAF on Eliquis, Anemia, HTN, HLD, DM2, NARSEEN, CKD4, GERD, Restrictive/obstructive lung disease here for follow up CV care.    4/21/20  Pt seen at [facility] last by CARMELINA Spencer 9/18/19. Overall feels great for the most part. He has been started to gain some water weight in the past, he had been on Entreso by Dr. Menendez, lost 173 lbs. He had paracentesis in past was told due to CHF. His weight started coming back up to 208 lbs and then was gaining weight and he has doubled his meds - Entresto. Otherwise feels well has good energy. He changed here due to insurance.   BP - 108/67, pulse 62, oxygen 98%, weight 209 lbs, blood sugar 127    5/22/20  BNP was neg after last visit, Cr slightly increased with BUN elevated told to take lasix daily and PRN extra. He saw Dr. Holley yesterday, was given Flonase breathing improved.   /120s systolics, 67 diastolic, pulse 60s. . Overall doing will. Needs device clinic.     7/16/20  ECHO with EF 50%, mild to mod MR/TR. Pt had trouble getting Entresto filled due to being in donut hole but patient assistance form filled out.   He has significant back pain. He is euvolemic, no CP/SOB.   ECG - bi V paced    9/24/20 - hosp f/u  He was admitted for lower GI bleed and acute blood loss anemia. Initial H/H of 5.1/16.9.  Patient was transfused 2 units of PRBCs. GI consulted. Will hold anticoagulations. Today H/H 7.4/23.3. Case  discussed with Dr. Farmer, will need  EGD and colonoscopy to evaluate further and given recent anticoagulation to decrease the risk of bleeding from the procedures will plan for the procedures in 5 days. As of 9/20 pt had a bowel movement with red blood  yesterday morning, no further episodes reported. This morning H/H 6.7/21.5, will transfuse 2 units of PRBCs. Plans for EGD/colonoscopy on Tuesday. As of 9/21 no active bleeding noted. H/H 9.0/28.0. Continue to monitor. Plan for procedure tomorrow. 09/22: Patient had EGD that showed gastritis which was biopsied. Discussed with GI who recommends dc home today, restart anti coagulation tomorrow, and f/u OP for video capsule study. GI will arrange OP f/u and call patient. New Rx for Pantoprazole 40mg daily sent to pharmacy.   He has restarted taking Eliqis    11/3/20  He has been to ER few times with fatigue and symptomatic anemia, transfused 10/23/20 and then again last Sunday 11/2/20. He cannot get Video capsule study due to pacemaker. He will see Dr. Nath for further workup tx. He has severe back pain, had injection which improved sx will get RFA. Will change Entresto due to Losartan, has trouble affording some meds, ELiquis is also expensive but prefer over coumadin.     2/11/21  He has gotten both COVID vaccines, he is pleased with the process. He has been doing well overall. No CP/SOB. BP and HR well controlled.   He had renal eval and told may need more Losartan.     5/6/21  BP and HR is well controlled.   ECG - V paced, Biv paced    From Device check  saw this pt in clinic in February. He appears to be symptomatic to [place] alert for fluid overload. He confirms taking Lasix BID. He reports having 3L of fluid taken off of him a year ago in the hospital. He has AF and is currently undersensing some of the episodes. We will attempt to address this in clinic. He is a CRT-P and his BiV pacing has decreased to 91.8%, which in order to be effective is preferred at 95%. He does not see an EP.    Today he has fatigue and back pain. He has had crawfish a few times last few months.     7/15/21  Last device interrog with AF with freq mode switch and inc Optivol.     Per Dr. Walton-    Last week he was found to have a  critical low hemoglobin of 5.8.  Sent to the ER and had 2 units packed red blood cells transfused.  Has had a problem with chronic anemia with multiple transfusions in the past.  Last EGD and colonoscopy were negative.  No small bowel studies were done at that time.  Also followed by hematology for iron infusions.    He will see heme/onc next week.     10/28/21  From pt - I have some shortness of breath, but I have been under the weather. I've seen Dr. Walton last Friday. Feeling bloated, took a potassium tablet last night. Very little coughing today, starting to dry up. See you tomorrow.  His feet have been getting too tight in shoes.     11/11/21  He went to a recent party where he was very active and feeling well now. He had recent URI sx imporve. Weight 205-207 lbs. BP and HR stable.   ECG - V paced, Bi V paced    Hospital Course:   Patient was kept on OBS for symptomatic anemia under the care of hospital medicine.  12/23: Hgb increased to 7.4 overnight with transfusion of 2u pRBC. Pt is still very tired. Troponin increased to 0.118, pt mukesh CP, palpitations, but admits to SOB with exertion. cardiology consulted. GI feels like no indication for intervention, will continue OP f/u for VCE that is scheduled for next week, they have signed off. Heme/onc following - will start IV iron and continue OP f/u as scheduled.  12/24/21 Hemoglobin stable. Will have VCE completed outpatient per GI. Okay to resume Eliquis 2.5mg daily BID. He was asked to hold Plavix and start ASA for CAD until after GI test.     12/30/21  BP 140s/70s. HR 80s. Pt had recent admission for anemia with elevated trop, s/p PRBC, is on Eliquis 2.5 BID, DAPT on hold. He is taking only half tab Eliquis BID.     Component Ref Range & Units 3 wk ago   (12/30/21) 1 mo ago   (12/17/21) 2 mo ago   (10/28/21) 8 mo ago   (5/11/21) 1 yr ago   (4/27/20) 3 yr ago   (7/31/18) 3 yr ago   (3/5/18)   BNP 0 - 99 pg/mL 471 High   222 High  CM  282 High  CM             1/20/22  BP and HR stable today. BNP was elevated told pt - labs reveal elevated BNP due to extra fluid, need to double up lasix to 40mg twice a day until breathing is improving and have less swelling/weight, continue monitoring bP and weights daily and low salt diet, follow up with me in 2-3 weeks will adjust meds further. Kidney function, potassium and magnesium are stable.   He still has more back pain, sees Dr. Gamez. His breathing is better and energy is improved. He is taking iron tabs. His video capsule was neg. No further bleeding issues.   219 lbs --> 206 lbs today     2/10/22  Pt had eval with Dr. Price will be a candidate for Watchman. Labs from last visit - labs reveal low potassium and magnesium, start taking potassium 20 meq and magnesium 400mg daily. BNP has improved.   BP and HR stable now. He wants to get Watchman in [place] possible. Breathing has improved, weight is coming down.     4/28/22  BP and HR stable. He has been gaining more weight lately, about 6 lbs, he has more KING as well. His device interrogation .  OptiVol WNL, possible fluid build up Feb 7 - May 22nd.  Pt has congestive heart failure and cardiomegaly.  Well-functioning device.     8/11/22  Pt had CRT-P Gen change 6/2022 due to battery depletion . He had recent eval with Dr. Moody regarding banding hemorrhoids if anemia/bleeding is worse but may need EGD/Cscope with GI in the interim. PT remains on Eliquis 2.5 mg BID, not on ASA now. He has significant back pain can't walk as much, he is seeing Dr. Demarco.   BP low normal, HR 96.     10/27/22  Pt has upcoming spinal cord stim on Nevro 11/16/22 with Dr. Demarco. BP and Hr well controlled. BMI 28 - 190 lbs. Recent device interrogation with IDANIA, John, had CHF exac mid aug.     Patient feels no chest pain,  no PND, no palpitation, no dizziness, no syncope, no CNS symptoms.    Patient has dec exercise tolerance.    Patient is compliant with medications.    CRT interrogation  10/6/22  Underlying rhythm c/w: AF w/CHB, V paced @ 30 bpm, no escape seen  Incision: Chronic left upper chest, C/D/I, no signs of pocket erosion.  Device fxn WNL  RA pacing n/a%, BiV pacing 99.7%  Battery Status/Longevity: 6.7 years, 3.12V (RRT 2.60V)  Atrial arrhythmias: chronic AF  Ventricular arrhythmias: none  Anticoagulant: Eliquis  CHF: OptiVol currently WNL, possible fluid build up Aug 15-17th.  Pt has congestive heart failure and cardiomegaly.   Reprogramming at this visit: none  Nurse note: Interrogation reviewed by XIMENA Franco NP  F/U via remote interrogation in 3 mos     Interrogation performed by SELIN Madden      Results for orders placed during the hospital encounter of 04/28/22    Echo    Interpretation Summary  · The left ventricle is normal in size with concentric hypertrophy and moderately decreased systolic function.  · The estimated ejection fraction is 35%.  · Grade III left ventricular diastolic dysfunction.  · Mild right ventricular enlargement with moderately reduced right ventricular systolic function.  · Mild left atrial enlargement.  · Mild right atrial enlargement.  · There is mild aortic valve stenosis.  · Aortic valve area is 1.53 cm2; peak velocity is 1.78 m/s; mean gradient is 7 mmHg.  · Mild mitral regurgitation.  · Mild tricuspid regurgitation.  · Elevated central venous pressure (15 mmHg).  · The estimated PA systolic pressure is 37 mmHg.  · There is abnormal septal wall motion. There is systolic and diastolic flattening of the interventricular septum consistent with right ventricle pressure and volume overload.  · There is moderate left ventricular global hypokinesis.      Past Medical History:   Diagnosis Date    Abnormal PFT     Anemia     Arthritis     Atrial fibrillation 10/19/2017    Back pain     Congestive heart failure 03/05/2018    Coronary artery disease     DM (diabetes mellitus) 2018     am 06/23/2020    DM (diabetes mellitus) 2016     am 08/17/2021     Hyperlipemia     Hypertension     Myocardial infarction     NASREEN (obstructive sleep apnea) 06/05/2018    Prostate cancer 2015    Tobacco dependence     Type 2 diabetes mellitus        Past Surgical History:   Procedure Laterality Date    COLONOSCOPY N/A 9/22/2020    Procedure: COLONOSCOPY;  Surgeon: Javier Farmer MD;  Location: [facility];  Service: Endoscopy;  Laterality: N/A;    CORONARY ARTERY BYPASS GRAFT  1987    EPIDURAL STEROID INJECTION N/A 11/22/2019    Procedure: Lumbar L5/S1 IL XUAN;  Surgeon: Tacho Trivedi MD;  Location: [facility] PAIN MGT;  Service: Pain Management;  Laterality: N/A;    EPIDURAL STEROID INJECTION N/A 1/6/2020    Procedure: Lumbar L5/S1 IL XUAN;  Surgeon: Tacho Trivedi MD;  [facility];  Service: Pain Management;  Laterality: N/A;    EPIDURAL STEROID INJECTION N/A 2/10/2020    Procedure: Caudal XUAN;  Surgeon: Tacho Trivedi MD;  Location: [facility] PAIN [facility]GT;  Service: Pain Management;  Laterality: N/A;    ESOPHAGOGASTRODUODENOSCOPY N/A 9/22/2020    Procedure: EGD (ESOPHAGOGASTRODUODENOSCOPY);  Surgeon: Javier Farmer MD;  Location: [facility];  Service: Endoscopy;  Laterality: N/A;    INJECTION OF ANESTHETIC AGENT AROUND MEDIAL BRANCH NERVES INNERVATING LUMBAR FACET JOINT Bilateral 10/12/2020    Procedure: Bilateral L3-5 MBB;  Surgeon: Tacho Trivedi MD;  Location: [facility] PAIN MGT;  Service: Pain Management;  Laterality: Bilateral;    INJECTION OF ANESTHETIC AGENT AROUND MEDIAL BRANCH NERVES INNERVATING LUMBAR FACET JOINT Bilateral 12/21/2020    Procedure: Bilateral L3-5 MBB with steroid;  Surgeon: Tacho Trivedi MD;  Location: [facility] PAIN [facility]GT;  Service: Pain Management;  Laterality: Bilateral;    INTRALUMINAL GASTROINTESTINAL TRACT IMAGING VIA CAPSULE N/A 12/29/2021    Procedure: IMAGING PROCEDURE, GI TRACT, INTRALUMINAL, VIA CAPSULE;  Surgeon: Tahir Geronimo RN;  Location: [facility];  Service: Endoscopy;  Laterality: N/A;    PROSTATE SURGERY      prostate  radiation    RADIOFREQUENCY THERMOCOAGULATION Left 2021    Procedure: Left L3-5 Lumbar RFA;  Surgeon: Tacho Trivedi MD;  Location: [facility];  Service: Pain Management;  Laterality: Left;    RADIOFREQUENCY THERMOCOAGULATION Right 2021    Procedure: Right L3-5 Lumbar RFA;  Surgeon: Tacho Trivedi MD;  Location: [facility];  Service: Pain Management;  Laterality: Right;    REPLACEMENT OF PACEMAKER GENERATOR Left 2022    Procedure: REPLACEMENT, PULSE GENERATOR, CARDIAC PACEMAKER/CRT-P device;  Surgeon: Darrel Carrero MD;  Location: [facility] CATH LAB;  Service: Cardiology;  Laterality: Left;  NOT MRI safe   Pacer and leads implanted 2017, Dr. Souleymane CARROLLT Consulta CRT-P C4TR01, [id number]   A lead: Mdt 5076 CapSureFix® Novus, [id number]   RV lead: Mdt 5076 CapSureFix® Novus, [id number]   LV lead: Jovan 4196 At    SELECTIVE INJECTION OF ANESTHETIC AGENT AROUND LUMBAR SPINAL NERVE ROOT BY TRANSFORAMINAL APPROACH Bilateral 2022    Procedure: Bilateral L3/4 TF XUAN with RN IV sedation;  Surgeon: Philipp Demarco MD;  Location: [facility] PAIN [facility]GT;  Service: Pain Management;  Laterality: Bilateral;    SPINE SURGERY      fusion    TONSILLECTOMY         Social History     Tobacco Use    Smoking status: Former     Packs/day: 1.50     Years: 20.00     Pack years: 30.00     Types: Cigarettes     Start date:      Quit date:      Years since quittin.8    Smokeless tobacco: Never   Substance Use Topics    Alcohol use: No    Drug use: No       Family History   Problem Relation Age of Onset    Heart attack Mother     Diabetes Mother     Heart disease Mother     Cataracts Mother     Stroke Father     Heart disease Father     Heart disease Brother        Patient's Medications   New Prescriptions    No medications on file   Previous Medications    ACETAMINOPHEN (TYLENOL) 500 MG TABLET    Take 2 tablets (1,000 mg total) by mouth every 6 (six) hours as needed for Pain.    APIXABAN (ELIQUIS) 2.5 MG  TAB    Take 1 tablet (2.5 mg total) by mouth 2 (two) times daily.    ATORVASTATIN (LIPITOR) 80 MG TABLET    Take 1 tablet (80 mg total) by mouth every evening.    BLOOD SUGAR DIAGNOSTIC STRP    Check blood glucose levels daily in the AM fasting and 1-2 times more daily.    CEFADROXIL (DURICEF) 500 MG CAP    Take 1 capsule (500 mg total) by mouth every 12 (twelve) hours.    CHOLECALCIFEROL, VITAMIN D3, (VITAMIN D3) 25 MCG (1,000 UNIT) CAPSULE    Take 1,000 Units by mouth once daily.    CLONAZEPAM (KLONOPIN) 1 MG TABLET    Take 1 tablet (1 mg total) by mouth nightly as needed for Anxiety.    FLUTICASONE PROPIONATE (FLONASE) 50 MCG/ACTUATION NASAL SPRAY    USE 2 SPRAYS IN EACH NOSTRIL EVERY DAY    FUROSEMIDE (LASIX) 40 MG TABLET    Take 1 tablet (40 mg total) by mouth 2 (two) times daily. Can double to 2 tablets twice a day for 3 days for more swelling/fluid build up - take 80mg twice a day    KRILL/OM-3/DHA/EPA/PHOSPHO/AST (MEGARED OMEGA-3 KRILL OIL ORAL)    Take 1 capsule by mouth every morning.    LANCETS MISC    Check blood glucose levels daily in the AM fasting and 1-2 times more daily.    LEVOCETIRIZINE (XYZAL) 5 MG TABLET    TAKE 1 TABLET EVERY EVENING    LOSARTAN (COZAAR) 50 MG TABLET    Take 1 tablet (50 mg total) by mouth once daily.    MAGNESIUM OXIDE (MAG-OX) 400 MG (241.3 MG MAGNESIUM) TABLET    Take 1 tablet (400 mg total) by mouth once daily.    MULTIVITAMIN CAPSULE    Take 1 capsule by mouth once daily.    PANTOPRAZOLE (PROTONIX) 40 MG TABLET    Take 1 tablet (40 mg total) by mouth once daily.    POTASSIUM CHLORIDE SA (K-DUR,KLOR-CON) 20 MEQ TABLET    Take 1 tablet (20 mEq total) by mouth once daily.    PYRIDOXINE, VITAMIN B6, (B-6) 100 MG TAB    Take 50 mg by mouth once daily.    SPIRONOLACTONE (ALDACTONE) 25 MG TABLET    Take 1 tablet (25 mg total) by mouth once daily.    VIT A/VIT C/VIT E/ZINC/COPPER (OCUVITE PRESERVISION ORAL)    Take 1 capsule by mouth every evening.   Modified Medications    No  "medications on file   Discontinued Medications    No medications on file       Review of Systems   Constitutional:  Positive for malaise/fatigue.   HENT: Negative.     Eyes: Negative.    Respiratory:  Positive for shortness of breath.    Cardiovascular:  Positive for leg swelling. Negative for chest pain.   Gastrointestinal: Negative.    Genitourinary: Negative.    Musculoskeletal: Negative.    Skin: Negative.    Neurological: Negative.    Endo/Heme/Allergies: Negative.    Psychiatric/Behavioral: Negative.     All 12 systems otherwise negative.    Wt Readings from Last 3 Encounters:   10/27/22 86.2 kg (190 lb 0.6 oz)   10/18/22 85.6 kg (188 lb 13.2 oz)   10/14/22 85.7 kg (188 lb 15 oz)     Temp Readings from Last 3 Encounters:   10/14/22 97.7 °F (36.5 °C)   09/29/22 98.1 °F (36.7 °C) (Temporal)   09/22/22 97.7 °F (36.5 °C) (Temporal)     BP Readings from Last 3 Encounters:   10/27/22 110/60   10/18/22 130/66   10/14/22 130/62     Pulse Readings from Last 3 Encounters:   10/27/22 91   10/18/22 87   10/14/22 81       /60 (BP Location: Left arm, Patient Position: Sitting, BP Method: Large (Manual))   Pulse 91   Ht 5' 9" (1.753 m)   Wt 86.2 kg (190 lb 0.6 oz)   SpO2 100%   BMI 28.06 kg/m²     Objective:   Physical Exam  Vitals and nursing note reviewed.   Constitutional:       General: He is not in acute distress.     Appearance: He is well-developed. He is not diaphoretic.   HENT:      Head: Normocephalic and atraumatic.      Nose: Nose normal.   Eyes:      General: No scleral icterus.        Right eye: No discharge.         Left eye: No discharge.      Conjunctiva/sclera: Conjunctivae normal.   Neck:      Thyroid: No thyromegaly.      Vascular: No JVD.   Cardiovascular:      Rate and Rhythm: Normal rate and regular rhythm.      Heart sounds: S1 normal and S2 normal. Murmur heard.     No friction rub. No gallop. No S3 or S4 sounds.   Pulmonary:      Effort: Pulmonary effort is normal. No respiratory " distress.      Breath sounds: Normal breath sounds. No stridor. No wheezing or rales.   Chest:      Chest wall: No tenderness.   Abdominal:      General: Bowel sounds are normal. There is no distension.      Palpations: Abdomen is soft. There is no mass.      Tenderness: There is no abdominal tenderness. There is no rebound.   Genitourinary:     Comments: Deferred  Musculoskeletal:         General: No tenderness or deformity. Normal range of motion.      Cervical back: Normal range of motion and neck supple.   Lymphadenopathy:      Cervical: No cervical adenopathy.   Skin:     General: Skin is warm and dry.      Coloration: Skin is not pale.      Findings: No erythema or rash.   Neurological:      Mental Status: He is alert and oriented to person, place, and time.      Motor: No abnormal muscle tone.      Coordination: Coordination normal.   Psychiatric:         Behavior: Behavior normal.         Thought Content: Thought content normal.         Judgment: Judgment normal.       Lab Results   Component Value Date     10/05/2022    K 4.5 10/05/2022     (H) 10/05/2022    CO2 18 (L) 10/05/2022    BUN 32 (H) 10/05/2022    CREATININE 1.8 (H) 10/05/2022     10/05/2022    HGBA1C 4.9 10/14/2022    MG 1.8 04/30/2022    AST 21 10/05/2022    ALT 12 10/05/2022    ALBUMIN 4.1 10/05/2022    PROT 7.3 10/05/2022    BILITOT 0.4 10/05/2022    WBC 5.25 10/20/2022    HGB 12.3 (L) 10/20/2022    HCT 41.5 10/20/2022    MCV 92 10/20/2022     10/20/2022    INR 1.1 06/16/2022    TSH 1.268 11/04/2020    CHOL 124 07/14/2022    HDL 43 07/14/2022    LDLCALC 54.4 (L) 07/14/2022    TRIG 133 07/14/2022     (H) 05/11/2022     Assessment:      1. Presence of cardiac pacemaker    2. Chronic combined systolic and diastolic congestive heart failure    3. Presence of cardiac resynchronization therapy pacemaker (CRT-P)    4. S/P CABG x 3    5. Arteriosclerosis of aorta    6. Primary hypertension    7. Lumbar radiculopathy,  chronic    8. Mixed restrictive and obstructive lung disease    9. Paroxysmal atrial fibrillation    10. Cardiac pacemaker in situ    11. Longstanding persistent atrial fibrillation    12. Obstructive sleep apnea          Plan:   1. CAD s/p CABG x3, old MI  - cont meds - DAPT on hold due to anemia   - elevated trop sec to demand ischemia   - cont Eliquis    2. CHF EF 35%, with TR/MR, improved EF ;  --> 282 -> 222 --> 471 --> 230  --> 507 --> 276  Weight; 219 lbs --> 206  --> 200 lbs  --> 195 lbs  - cont meds lasix 40 BID ; with K/Mg   - needs to take 80mg BID PRN edema  - cont to monitor valve disease  - changed Entresto to Losartan   - cont Aldactone 25mg    3. Chronic Afib   - cont meds - Eliquis   - f/u Dr. Price for Watchman device - pending/on hold     [id number] score: 5  HAS-BLED score: 5    If you answer NO to any of the four criteria below, the patient does not meet the WATCHMAN implant eligibility requirements.     Patient has non-valvular atrial fibrillation: Yes  Patient has an increased risk for stroke and is recommended for oral anticoagulation (OAC): Yes  Patient is suitable for short-term anticoagulation therapy but deemed unable to take long-term OAC: Yes  Patient has an appropriate rationale to seek a non-pharmacological alternative to OACs. Specific factors include: History of bleeding or increased bleeding risk,    CHADSVASC - 5  HASLBED score - 5    4. NASREEN  - cont CPAP    5. Restricive/obstrucive lung disease  - cont tx per PCP/pulm    6. DM2 6.0  --> 6.1 --> 6.3 --> 5.1 --> 4.9  - cont tx per PCP    7. AVB s/p ICD - s/p CRT-P gen change 6/2022  - cont to monitor  - f/u device clinic and EP    8. Anemia sec to GIB, CKD  - f/u with GI and heme/onc; s/p video capsule - neg in past  - s/p PRBC, is on Eliquis 2.5 BID, DAPT on hold.   - cont iron tabs and IV iron infusion   - f/u colorectal sx - may need hemorrhoidal banding sx    9. CKD  - cont to monitor   - f/u nephro     10. Preop CV  eval - spinal cord stim on Nevro 11/16/22 with Dr. Demarco  - low CV risk to proceed, ok to hold Eliquis 3 days prior and resume post op.     Thank you for allowing me to participate in this patient's care. Please do not hesitate to contact me with any questions or concerns. Consult note has been forwarded to the referral physician.

## 2022-10-27 NOTE — PROGRESS NOTES
Subjective:       Patient ID: Jake Ortiz is a 82 y.o. male.    Chief Complaint: Follow-up    The patient is here for follow-up visit regarding evaluation of his diarrhea.  He was last seen in the office on 10/18 and the details of that visit are outlined in note from that date in reviewed for this encounter today.    Stool studies were ordered at the time of his last visit for occult blood, WBCs, culture for pathogens, Giardia, Cryptosporidium, pancreatic elastase, and stool fat.  Studies were also done for calprotectin.  All of these studies were negative for cause of the patient's diarrhea.    The patient is doing better now after removal of magnesium containing meds from his regiment.  He had undergone EGD and colonoscopy as well as VCE at the end of 2021 so not concerned that any major pathology which is life-threatening would have been a part of this picture.  His as to contact us for any further problems.      Review of Systems   Constitutional: Negative.  Negative for activity change, appetite change, chills, diaphoresis, fatigue, fever and unexpected weight change.   HENT:  Negative for congestion, ear discharge, facial swelling, hearing loss, nosebleeds, postnasal drip, sinus pressure, sneezing, tinnitus, trouble swallowing and voice change.    Eyes:  Negative for photophobia, redness and visual disturbance.   Respiratory:  Negative for cough, chest tightness, shortness of breath and wheezing.    Cardiovascular:  Negative for chest pain and palpitations.   Gastrointestinal:  Negative for abdominal distention, abdominal pain, blood in stool, constipation, diarrhea, nausea, rectal pain and vomiting.   Genitourinary:  Negative for difficulty urinating, dysuria, flank pain, frequency, hematuria, penile discharge, scrotal swelling, testicular pain and urgency.   Musculoskeletal:  Negative for arthralgias, back pain, gait problem, joint swelling, myalgias and neck stiffness.   Skin:  Negative for color  change, pallor, rash and wound.   Neurological:  Negative for dizziness, tremors, seizures, syncope, facial asymmetry, speech difficulty, weakness, light-headedness, numbness and headaches.   Hematological:  Negative for adenopathy. Does not bruise/bleed easily.   Psychiatric/Behavioral:  Negative for agitation, confusion, hallucinations, sleep disturbance and suicidal ideas.      Objective:      Physical Exam  Vitals reviewed.       Assessment:   No diagnosis found.     Plan:   There are no diagnoses linked to this encounter.

## 2022-11-25 NOTE — TELEPHONE ENCOUNTER
Called pt to request clarification on which 'code' he was referencing.  No answer, left message with device clinic phone number. Pt has clinic check on 11/29 to address undersensing seen on remote check.  Will discuss any questions/concerns at that time.

## 2022-11-29 NOTE — TELEPHONE ENCOUNTER
Reached out to the pt in regards of his procedure.   Pt is switching to  [employer] insurance.

## 2022-11-29 NOTE — TELEPHONE ENCOUNTER
----- Message from Jill Simpson sent at 11/29/2022  4:27 PM CST -----  Contact: [email or web address]  Patient is calling to speak with to the nurse regarding questions and concerns. Reports have questions and surgery. Please give the patient a call back at [phone]  Thanks chayo

## 2022-12-27 PROBLEM — R71.8 ELEVATED MCV: Status: ACTIVE | Noted: 2022-01-01

## 2022-12-27 NOTE — ASSESSMENT & PLAN NOTE
Lab Results   Component Value Date     (H) 12/22/2022     Elevated MCV  Recommend B12/folate supplementation  Will continue to monitor

## 2022-12-27 NOTE — ASSESSMENT & PLAN NOTE
Lab Results   Component Value Date    HGB 10.5 (L) 12/22/2022     Hold Retacrit today for Hgb >10g/dL       Follow-up in 4 weeks with cbc & cmp for possible Retacrit   se

## 2022-12-27 NOTE — PROGRESS NOTES
"Subjective:      Patient ID: Jake Ortiz is a 82 y.o. male.    Chief Complaint: Anemia of CKD      HPI:  Mr. Ortiz is a pleasant 82-year-old male who presents today for follow-up of anemia due to CKD. He has been receiving epo 40kU weekly as needed for hgb<10g/dL. He has needed blood transfusions in the past. Colonoscopy in 2020 showed internal hemorrhoids and small polyps- recommendation to repeat in 5 years. EGD in 2020 showed some gastritis. He then had VCE in 2021 which was normal. Pt seen by GI 2022 for positive occult stool samples. GI suspected positive occult blood due to internal hemorrhoids and referred to surgery, however, Dr. Moody did not think hemorrhoids are large contributing factor to his anemia and recommended repeating upper and lower endoscopy, but pt not agreeable to this at this time.    Interval History: Pt states overall he continues to feel "pretty good." He says since his pacemaker replacement he has felt even better and notes improvement in sob. He continues with back pain which is aggravated with bending over-- followed by pain management--and is currently in the process of re approval through insurance for  stimulator placement. He notes he was able to dance for almost an entire song at a twin party at the [employer] on [place] before he noticed ble radiculopathy and back pain. Denies n/v/d/c, lightheadedness, dizziness, hematuria, melena, hematochezia.      Social History     Socioeconomic History    Marital status:     Number of children: 0   Occupational History    Occupation: retired     Employer: [employer]   Tobacco Use    Smoking status: Former     Packs/day: 1.50     Years: 20.00     Pack years: 30.00     Types: Cigarettes     Start date:      Quit date:      Years since quittin.0    Smokeless tobacco: Never   Substance and Sexual Activity    Alcohol use: No    Drug use: No    Sexual activity: Not Currently     Social Determinants of " Health     Financial Resource Strain: High Risk    Difficulty of Paying Living Expenses: Very hard   Food Insecurity: Food Insecurity Present    Worried About Running Out of Food in the Last Year: Never true    Ran Out of Food in the Last Year: Often true   Transportation Needs: Unmet Transportation Needs    Lack of Transportation (Medical): Yes    Lack of Transportation (Non-Medical): Yes   Physical Activity: Inactive    Days of Exercise per Week: 0 days    Minutes of Exercise per Session: 0 min   Stress: Stress Concern Present    Feeling of Stress : Very much   Social Connections: Socially Integrated    Frequency of Communication with Friends and Family: More than three times a week    Frequency of Social Gatherings with Friends and Family: More than three times a week    Attends Latter-day Services: More than 4 times per year    Active Member of Clubs or Organizations: Yes    Attends Club or Organization Meetings: More than 4 times per year    Marital Status:    Housing Stability: High Risk    Unable to Pay for Housing in the Last Year: Yes    Number of Places Lived in the Last Year: 12    Unstable Housing in the Last Year: Yes       Family History   Problem Relation Age of Onset    Heart attack Mother     Diabetes Mother     Heart disease Mother     Cataracts Mother     Stroke Father     Heart disease Father     Heart disease Brother        Past Surgical History:   Procedure Laterality Date    COLONOSCOPY N/A 9/22/2020    Procedure: COLONOSCOPY;  Surgeon: Javier Farmer MD;  Location: [facility] ENDO;  Service: Endoscopy;  Laterality: N/A;    CORONARY ARTERY BYPASS GRAFT  1987    EPIDURAL STEROID INJECTION N/A 11/22/2019    Procedure: Lumbar L5/S1 IL XUAN;  Surgeon: Tacho Trivedi MD;  Location: [facility];  Service: Pain Management;  Laterality: N/A;    EPIDURAL STEROID INJECTION N/A 1/6/2020    Procedure: Lumbar L5/S1 IL XUAN;  Surgeon: Tacho Trivedi MD;  Location: [facility];  Service:  Pain Management;  Laterality: N/A;    EPIDURAL STEROID INJECTION N/A 2/10/2020    Procedure: Caudal XUAN;  Surgeon: Tacho Trivedi MD;  Location: [facility] PAIN MGT;  Service: Pain Management;  Laterality: N/A;    ESOPHAGOGASTRODUODENOSCOPY N/A 9/22/2020    Procedure: EGD (ESOPHAGOGASTRODUODENOSCOPY);  Surgeon: Javier Farmer MD;  Location: [facility] ENDO;  Service: Endoscopy;  Laterality: N/A;    INJECTION OF ANESTHETIC AGENT AROUND MEDIAL BRANCH NERVES INNERVATING LUMBAR FACET JOINT Bilateral 10/12/2020    Procedure: Bilateral L3-5 MBB;  Surgeon: Tacho Trivedi MD;  [facility];  Service: Pain Management;  Laterality: Bilateral;    INJECTION OF ANESTHETIC AGENT AROUND MEDIAL BRANCH NERVES INNERVATING LUMBAR FACET JOINT Bilateral 12/21/2020    Procedure: Bilateral L3-5 MBB with steroid;  Surgeon: Tacho Trivedi MD;  Location: [facility] PAIN MGT;  Service: Pain Management;  Laterality: Bilateral;    INTRALUMINAL GASTROINTESTINAL TRACT IMAGING VIA CAPSULE N/A 12/29/2021    Procedure: IMAGING PROCEDURE, GI TRACT, INTRALUMINAL, VIA CAPSULE;  Surgeon: Tahir Geronimo RN;  Location: [facility] ENDO;  Service: Endoscopy;  Laterality: N/A;    PROSTATE SURGERY      prostate radiation    RADIOFREQUENCY THERMOCOAGULATION Left 6/4/2021    Procedure: Left L3-5 Lumbar RFA;  Surgeon: Tacho Trivedi MD;  Location: [facility] PAIN MGT;  Service: Pain Management;  Laterality: Left;    RADIOFREQUENCY THERMOCOAGULATION Right 6/18/2021    Procedure: Right L3-5 Lumbar RFA;  Surgeon: Tacho Trivedi MD;  [facility];  Service: Pain Management;  Laterality: Right;    REPLACEMENT OF PACEMAKER GENERATOR Left 6/16/2022    Procedure: REPLACEMENT, PULSE GENERATOR, CARDIAC PACEMAKER/CRT-P device;  Surgeon: Darrel Carrero MD;  Location: [facility] CATH LAB;  Service: Cardiology;  Laterality: Left;  NOT MRI safe   Pacer and leads implanted 9/30/2017, Dr. Souleymane SANTACRUZ Consulta CRT-P C4TR01, [id number]   A lead: [id number]  CapSureFix® Novus, [id number]   RV lead: [id number] CapSureFix® Novus, [id number]   LV lead: Jovan [id number] At    SELECTIVE INJECTION OF ANESTHETIC AGENT AROUND LUMBAR SPINAL NERVE ROOT BY TRANSFORAMINAL APPROACH Bilateral 6/8/2022    Procedure: Bilateral L3/4 TF XUAN with RN IV sedation;  Surgeon: Philipp Demarco MD;  Location: [facility];  Service: Pain Management;  Laterality: Bilateral;    SPINE SURGERY      fusion    TONSILLECTOMY         Past Medical History:   Diagnosis Date    Abnormal PFT     Anemia     Arthritis     Atrial fibrillation 10/19/2017    Back pain     Congestive heart failure 03/05/2018    Coronary artery disease     DM (diabetes mellitus) 2018     am 06/23/2020    DM (diabetes mellitus) 2016     am 08/17/2021    Hyperlipemia     Hypertension     Myocardial infarction     NASREEN (obstructive sleep apnea) 06/05/2018    Prostate cancer 2015    Tobacco dependence     Type 2 diabetes mellitus        Review of Systems   Constitutional:  Negative for activity change, appetite change, chills, diaphoresis, fatigue, fever and unexpected weight change.   HENT:  Negative for congestion, nosebleeds and rhinorrhea.    Respiratory:  Negative for cough and shortness of breath.    Cardiovascular:  Negative for chest pain and leg swelling.   Gastrointestinal:  Negative for abdominal pain, blood in stool, constipation, diarrhea, nausea and vomiting.   Genitourinary:  Negative for hematuria.   Musculoskeletal:  Positive for arthralgias and back pain. Negative for gait problem and myalgias.   Skin:  Negative for color change, pallor and rash.   Neurological:  Negative for dizziness, weakness, light-headedness, numbness and headaches.     Medication List with Changes/Refills   Current Medications    ACETAMINOPHEN (TYLENOL) 500 MG TABLET    Take 2 tablets (1,000 mg total) by mouth every 6 (six) hours as needed for Pain.    APIXABAN (ELIQUIS) 2.5 MG TAB    Take 1 tablet (2.5 mg total) by mouth 2 (two) times daily.     ATORVASTATIN (LIPITOR) 80 MG TABLET    Take 1 tablet (80 mg total) by mouth every evening.    BLOOD SUGAR DIAGNOSTIC STRP    Check blood glucose levels daily in the AM fasting and 1-2 times more daily.    CEFADROXIL (DURICEF) 500 MG CAP    Take 1 capsule (500 mg total) by mouth every 12 (twelve) hours.    CHOLECALCIFEROL, VITAMIN D3, (VITAMIN D3) 25 MCG (1,000 UNIT) CAPSULE    Take 1,000 Units by mouth once daily.    CLONAZEPAM (KLONOPIN) 1 MG TABLET    Take 1 tablet (1 mg total) by mouth nightly as needed for Anxiety.    FLUTICASONE PROPIONATE (FLONASE) 50 MCG/ACTUATION NASAL SPRAY    USE 2 SPRAYS IN EACH NOSTRIL EVERY DAY    FUROSEMIDE (LASIX) 40 MG TABLET    Take 1 tablet (40 mg total) by mouth 2 (two) times daily. Can double to 2 tablets twice a day for 3 days for more swelling/fluid build up - take 80mg twice a day    KRILL/OM-3/DHA/EPA/PHOSPHO/AST (MEGARED OMEGA-3 KRILL OIL ORAL)    Take 1 capsule by mouth every morning.    LANCETS MISC    Check blood glucose levels daily in the AM fasting and 1-2 times more daily.    LEVOCETIRIZINE (XYZAL) 5 MG TABLET    TAKE 1 TABLET EVERY EVENING    LOSARTAN (COZAAR) 50 MG TABLET    Take 1 tablet (50 mg total) by mouth once daily.    MAGNESIUM OXIDE (MAG-OX) 400 MG (241.3 MG MAGNESIUM) TABLET    Take 1 tablet (400 mg total) by mouth once daily.    MULTIVITAMIN CAPSULE    Take 1 capsule by mouth once daily.    PANTOPRAZOLE (PROTONIX) 40 MG TABLET    Take 1 tablet (40 mg total) by mouth once daily.    POTASSIUM CHLORIDE SA (K-DUR,KLOR-CON) 20 MEQ TABLET    Take 1 tablet (20 mEq total) by mouth once daily.    PYRIDOXINE, VITAMIN B6, (B-6) 100 MG TAB    Take 50 mg by mouth once daily.    SPIRONOLACTONE (ALDACTONE) 25 MG TABLET    Take 1 tablet (25 mg total) by mouth once daily.    VIT A/VIT C/VIT E/ZINC/COPPER (OCUVITE PRESERVISION ORAL)    Take 1 capsule by mouth every evening.        Objective:     Vitals:    12/27/22 0955   BP: 118/65   Pulse: 81   Temp: 97.9 °F (36.6 °C)        Physical Exam  Vitals reviewed.   Constitutional:       General: He is not in acute distress.     Appearance: He is not ill-appearing, toxic-appearing or diaphoretic.   Eyes:      Conjunctiva/sclera: Conjunctivae normal.   Cardiovascular:      Rate and Rhythm: Normal rate.   Neurological:      Mental Status: He is alert.   Psychiatric:         Mood and Affect: Mood normal.         Behavior: Behavior normal.         Thought Content: Thought content normal.         Judgment: Judgment normal.     Lab Results   Component Value Date    WBC 4.43 12/22/2022    HGB 10.5 (L) 12/22/2022    HCT 33.2 (L) 12/22/2022     (H) 12/22/2022     12/22/2022       Lab Results   Component Value Date     12/22/2022    K 4.5 12/22/2022     12/22/2022    CO2 22 (L) 12/22/2022    BUN 29 (H) 12/22/2022    CREATININE 1.9 (H) 12/22/2022    CALCIUM 9.6 12/22/2022    ANIONGAP 9 12/22/2022    ESTGFRAFRICA 50 (A) 07/14/2022    EGFRNONAA 43 (A) 07/14/2022     Lab Results   Component Value Date    ALT 15 12/22/2022    AST 24 12/22/2022    GGT 58 (H) 08/08/2018    ALKPHOS 77 12/22/2022    BILITOT 0.4 12/22/2022       Assessment/Plan:     Problem List Items Addressed This Visit          Oncology    Iron deficiency anemia due to chronic blood loss     Lab Results   Component Value Date    IRON 91 12/22/2022    TRANSFERRIN 190 (L) 12/22/2022    TIBC 281 12/22/2022    FESATURATED 32 12/22/2022        FERRITIN                                                      160    S/p  Injectafer x 2 doses 09/15 & 09/22  Iron studies WNL  Continue on oral supplementation  Plan to reevaluate q 3 months to assess need for additional  IV iron therapy         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 - Primary     Lab Results   Component Value Date    HGB 10.5 (L) 12/22/2022   Hold Retacrit today for Hgb >10g/dL       Follow-up in 4 weeks with cbc & cmp for possible Retacrit   se           Relevant Orders    CBC Auto Differential     Comprehensive Metabolic Panel    Elevated MCV     Lab Results   Component Value Date     (H) 12/22/2022     Elevated MCV  Recommend B12/folate supplementation  Will continue to monitor          Relevant Orders    Folate    Vitamin B12     Other Visit Diagnoses       Nutritional anemia, unspecified        Relevant Orders    Folate    Vitamin B12            Med Onc Chart Routing      Follow up with physician    Follow up with KAYLIN 1 month.   Infusion scheduling note for possible retacrit   Injection scheduling note    Labs CBC, CMP, folate and vitamin B12   Lab interval:     Imaging    Pharmacy appointment    Other referrals         MEI AlvarezP-C  Hematology/Oncology

## 2022-12-28 NOTE — PROGRESS NOTES
Established Patient Chronic Pain Note     Referring Physician: No ref. provider found    PCP: Byron Stevens MD    Chief Complaint:   LBP     SUBJECTIVE:  Interval Hx: 12/29/22  Presents for five-month follow-up.  Today he reports overall he has been doing well after pacemaker surgery with Dr. Carrero, but continues to report significant lower back pain.  Today pain is rated a 6/10.  Patient has been taking 3-6 extra-strength Tylenol daily with marginal improvement in pain, takes the edge off.   To reiterate, patient has undergone multiple epidural steroid injections, medial branch blocks, radiofrequency ablation, physical therapy and failed treatment with anti-inflammatory and membrane stabilizing agents without significant relief.  Patient would like to pursue spinal cord stimulator trial.    Interval History (7/7/2022): Jake Ortiz presents today for follow-up visit.  he underwent Bilateral L3/4 TF XUAN on 06/08/2022.  The patient reports that he is/was unchanged following the procedure.  he reports 0% pain relief. Discussed SCS trial at previous visit, patient has read over brochures and spoken with friends/family that have an SCS and is interested in pursuing this procedure. Recently underwent pacemaker replacement on 06/16/2022.  Patient reports pain as 7/10 today. Reports continued low back pain without radiation. Reports last night he took 3 extra strength tylenol which temporarily mitigated his pain. Patient has undergone several injections including ESIs with varying levels and approaches, MBBs, RFAs, all with limited relief.      Interval history 05/10/2022  Patient presents for six-month follow-up.  He continues to report lower back pain at the level of L3-4 which radiates anteriorly.  Patient reports pain is exacerbated with prolonged standing and with ambulation.  Patient is interested in pursuing intervention.  Today patient denies significant lower extremity radiculopathy.  Again patient  reiterates no significant relief following prior lumbar medial branch blocks or radiofrequency ablation.  Patient has read spinal cord stimulation literature and would like to continue with transforaminal injection at this time.    Interval history 11/17/2020  Patient presents for CT lumbar spine review.  Today patient reports pain has been relatively well controlled.  Patient reports he was dancing earlier today.  When pain is severe patient reports pain in the lower back which radiates down the right lower extremity along the lateral distribution to the calf.  Patient denies significant weakness in the lower extremities or new onset bowel or bladder incontinence or gait ataxia.  Patient reviewed spinal cord stimulation educational material and would like to pursue pharmacologic management prior to trialing this.    HPI:  09/22/2021  Jake Ortiz is a 82 y.o. male with past medical history significant for chronic restrictive lung disease, lumbar spondylosis with radiculopathy status post L2-5 laminectomy, hypertension, hyperlipidemia, coronary artery disease status post myocardial infarctions status post triple-vessel CABG, atrial fibrillation, congestive heart failure status post percutaneous pacemaker placement, stage 4 chronic kidney disease, prostate adenocarcinoma, type 2 diabetes, GERD, obstructive sleep apnea, multi joint arthralgia who presents to the clinic for the evaluation of lower back and leg pain.  Patient reports longstanding history of lower back pain which is constant with radiation down the right lower extremity in L4-5 distribution to the calf.  Patient denies any radicular symptoms on the left.  Patient denies any significant weakness in bilateral lower extremities.  Pain is described as aching in nature and is a 7/10 today.  Pain at its worst is 10/10.  Pain is exacerbated with prolonged standing and with ambulation as well as with crossing his legs.  Patient is able to ambulate  approximately 100 ft before requiring rest.  Pain is improved with lumbar flexion, stretching.  Patient has received multiple prior interventions including medial branch block, radiofrequency ablation, epidural and caudal epidural steroid injection without meaningful relief.    Of note patient last saw Dr. Trivedi December 16, 2020 with recommendation for medial branch block and consideration of radiofrequency ablation.    Patient denies night fever/night sweats, urinary incontinence, bowel incontinence, significant weight loss, significant motor weakness and loss of sensations.    Pain Disability Index Review:     Last 3 PDI Scores 9/6/2022 7/6/2022 5/10/2022   Pain Disability Index (PDI) 49 15 42       Non-Pharmacologic Treatments:  Physical Therapy/Home Exercise: yes  Ice/Heat:yes  TENS: no  Acupuncture: no  Massage: no  Chiropractic: no    Other: no      Pain Medications:  - Adjuvant Medications: Clonazepam (Klonopin) and Tylenol (Acetaminophen)  - Anti-Coagulants: Aspirin, Plavix ( Clopidogrel) and Eliquis    Pain Procedures:   Surgeries:  Lumbar surgery with Dr. Powers with complications with staph infection causing 2 subsequent surgeries  Injections:  -June 4, 2021:  Left L3-5 lumbar radiofrequency ablation  -December 21, 2020:  Bilateral L3-5 medial branch block with steroid  - series of 3 injections (what sounds like ESIs) about 30 years ago with relief  - Lumbar Medial Branch Blocks and Lumbar Radiofrequency Ablation with limited relief  - L5/S1 IL XUAN on 11/22/19 with some pain relief for a few days  - L5/S1 IL XUAN on 1/6/2020 with limited pain relief for a few days  - caudal XUAN on 2/10/20 with 15% pain relief x 2 days  -10/12/2020 bilateral L3-5 medial branch blocks, 50% relief    Past Medical History:   Diagnosis Date    Abnormal PFT     Anemia     Arthritis     Atrial fibrillation 10/19/2017    Back pain     Congestive heart failure 03/05/2018    Coronary artery disease     DM (diabetes mellitus) 2018      am 06/23/2020    DM (diabetes mellitus) 2016     am 08/17/2021    Hyperlipemia     Hypertension     Myocardial infarction     NASREEN (obstructive sleep apnea) 06/05/2018    Prostate cancer 2015    Tobacco dependence     Type 2 diabetes mellitus      Past Surgical History:   Procedure Laterality Date    COLONOSCOPY N/A 9/22/2020    Procedure: COLONOSCOPY;  Surgeon: Javier Farmer MD;  Location: [facility];  Service: Endoscopy;  Laterality: N/A;    CORONARY ARTERY BYPASS GRAFT  1987    EPIDURAL STEROID INJECTION N/A 11/22/2019    Procedure: Lumbar L5/S1 IL XUAN;  Surgeon: Tacho Trivedi MD;  Location: [facility];  Service: Pain Management;  Laterality: N/A;    EPIDURAL STEROID INJECTION N/A 1/6/2020    Procedure: Lumbar L5/S1 IL XUAN;  Surgeon: Tacho Trivedi MD;  Location: [facility];  Service: Pain Management;  Laterality: N/A;    EPIDURAL STEROID INJECTION N/A 2/10/2020    Procedure: Caudal XUAN;  Surgeon: Tacho Trivedi MD;  Location: [facility];  Service: Pain Management;  Laterality: N/A;    ESOPHAGOGASTRODUODENOSCOPY N/A 9/22/2020    Procedure: EGD (ESOPHAGOGASTRODUODENOSCOPY);  Surgeon: Javier Farmer MD;  Location: [facility];  Service: Endoscopy;  Laterality: N/A;    INJECTION OF ANESTHETIC AGENT AROUND MEDIAL BRANCH NERVES INNERVATING LUMBAR FACET JOINT Bilateral 10/12/2020    Procedure: Bilateral L3-5 MBB;  Surgeon: Tacho Trivedi MD;  Location: [facility];  Service: Pain Management;  Laterality: Bilateral;    INJECTION OF ANESTHETIC AGENT AROUND MEDIAL BRANCH NERVES INNERVATING LUMBAR FACET JOINT Bilateral 12/21/2020    Procedure: Bilateral L3-5 MBB with steroid;  Surgeon: Tacho Trivedi MD;  Location: [facility];  Service: Pain Management;  Laterality: Bilateral;    INTRALUMINAL GASTROINTESTINAL TRACT IMAGING VIA CAPSULE N/A 12/29/2021    Procedure: IMAGING PROCEDURE, GI TRACT, INTRALUMINAL, VIA CAPSULE;  Surgeon: Tahir Geronimo RN;  Location:  [facility];  Service: Endoscopy;  Laterality: N/A;    PROSTATE SURGERY      prostate radiation    RADIOFREQUENCY THERMOCOAGULATION Left 6/4/2021    Procedure: Left L3-5 Lumbar RFA;  Surgeon: Tacho Trivedi MD;  Location: [facility]IN [facility]GT;  Service: Pain Management;  Laterality: Left;    RADIOFREQUENCY THERMOCOAGULATION Right 6/18/2021    Procedure: Right L3-5 Lumbar RFA;  Surgeon: Tacho Trivedi MD;  Location: [facility]IN [facility]GT;  Service: Pain Management;  Laterality: Right;    REPLACEMENT OF PACEMAKER GENERATOR Left 6/16/2022    Procedure: REPLACEMENT, PULSE GENERATOR, CARDIAC PACEMAKER/CRT-P device;  Surgeon: Darrel Carrero MD;  Location: [facility] CATH LAB;  Service: Cardiology;  Laterality: Left;  NOT MRI safe   Pacer and leads implanted 9/30/2017, Dr. Souleymane CARROLLT Consulta CRT-P C4TR01, [id number]   A lead: Mdt 5076 CapSureFix® Novus, [id number]   RV lead: Mdt 5076 CapSureFix® Novus, [id number]   LV lead: Mdt 4196 At    SELECTIVE INJECTION OF ANESTHETIC AGENT AROUND LUMBAR SPINAL NERVE ROOT BY TRANSFORAMINAL APPROACH Bilateral 6/8/2022    Procedure: Bilateral L3/4 TF XUAN with RN IV sedation;  Surgeon: Philipp Demarco MD;  Location: [facility]IN [facility]GT;  Service: Pain Management;  Laterality: Bilateral;    SPINE SURGERY      fusion    TONSILLECTOMY       Review of patient's allergies indicates:  No Known Allergies    Current Outpatient Medications   Medication Sig    acetaminophen (TYLENOL) 500 MG tablet Take 2 tablets (1,000 mg total) by mouth every 6 (six) hours as needed for Pain.    apixaban (ELIQUIS) 2.5 mg Tab Take 1 tablet (2.5 mg total) by mouth 2 (two) times daily.    atorvastatin (LIPITOR) 80 MG tablet Take 1 tablet (80 mg total) by mouth every evening.    blood sugar diagnostic Strp Check blood glucose levels daily in the AM fasting and 1-2 times more daily.    cefadroxil (DURICEF) 500 MG Cap Take 1 capsule (500 mg total) by mouth every 12 (twelve) hours.    cholecalciferol, vitamin D3, (VITAMIN D3) 25 mcg  (1,000 unit) capsule Take 1,000 Units by mouth once daily.    clonazePAM (KLONOPIN) 1 MG tablet Take 1 tablet (1 mg total) by mouth nightly as needed for Anxiety.    fluticasone propionate (FLONASE) 50 mcg/actuation nasal spray USE 2 SPRAYS IN EACH NOSTRIL EVERY DAY    furosemide (LASIX) 40 MG tablet Take 1 tablet (40 mg total) by mouth 2 (two) times daily. Can double to 2 tablets twice a day for 3 days for more swelling/fluid build up - take 80mg twice a day    krill/om-3/dha/epa/phospho/ast (MEGARED OMEGA-3 KRILL OIL ORAL) Take 1 capsule by mouth every morning.    lancets Misc Check blood glucose levels daily in the AM fasting and 1-2 times more daily.    levocetirizine (XYZAL) 5 MG tablet TAKE 1 TABLET EVERY EVENING    losartan (COZAAR) 50 MG tablet Take 1 tablet (50 mg total) by mouth once daily.    magnesium oxide (MAG-OX) 400 mg (241.3 mg magnesium) tablet Take 1 tablet (400 mg total) by mouth once daily.    multivitamin capsule Take 1 capsule by mouth once daily.    pantoprazole (PROTONIX) 40 MG tablet Take 1 tablet (40 mg total) by mouth once daily.    potassium chloride SA (K-DUR,KLOR-CON) 20 MEQ tablet Take 1 tablet (20 mEq total) by mouth once daily.    pregabalin (LYRICA) 225 MG Cap Take 1 capsule (225 mg total) by mouth 2 (two) times daily.    pyridoxine, vitamin B6, (B-6) 100 MG Tab Take 50 mg by mouth once daily.    spironolactone (ALDACTONE) 25 MG tablet Take 1 tablet (25 mg total) by mouth once daily.    vit A/vit C/vit E/zinc/copper (OCUVITE PRESERVISION ORAL) Take 1 capsule by mouth every evening.     No current facility-administered medications for this visit.     Facility-Administered Medications Ordered in Other Visits   Medication    ondansetron injection 4 mg       Review of Systems     GENERAL:  No weight loss, malaise or fevers.  HEENT:   No recent changes in vision or hearing  NECK:  Negative for lumps, no difficulty with swallowing.  RESPIRATORY:  Negative for cough, wheezing or shortness  of breath, patient denies any recent URI.  CARDIOVASCULAR:  Negative for chest pain, leg swelling or palpitations.  GI:  Negative for abdominal discomfort, blood in stools or black stools or change in bowel habits.  MUSCULOSKELETAL:  See HPI.  SKIN:  Negative for lesions, rash, and itching.  PSYCH:  No mood disorder or recent psychosocial stressors.   HEMATOLOGY/LYMPHOLOGY:  Negative for prolonged bleeding, bruising easily or swollen nodes.    NEURO:   No history of headaches, syncope, paralysis, seizures or tremors.  All other reviewed and negative other than HPI.    OBJECTIVE:    There were no vitals taken for this visit.      Physical Exam    GENERAL: Well appearing, in no acute distress, alert and oriented x3.  PSYCH:  Mood and affect appropriate.  SKIN: Skin color, texture, turgor normal, no rashes or lesions.  HEAD/FACE:  Normocephalic, atraumatic. Cranial nerves grossly intact.    CV: RRR with palpation of the radial artery.  PULM: No evidence of respiratory difficulty, symmetric chest rise.  GI:  Soft and non-tender.    BACK: Straight leg raising in the sitting and supine positions is negative to radicular pain. No pain to palpation over the facet joints of the lumbar spine or spinous processes. Normal range of motion without pain reproduction.  EXTREMITIES: Peripheral joint ROM is full and pain free without obvious instability or laxity in all four extremities. No deformities, edema, or skin discoloration. Good capillary refill.  MUSCULOSKELETAL: Able to stand on heels & toes.   Shoulder, hip, and knee provocative maneuvers are negative.  There is no pain with palpation over the sacroiliac joints bilaterally.  FABERs test is negative.  FAER test is negative bilaterally.   Bilateral upper and lower extremity strength is normal and symmetric.  No atrophy or tone abnormalities are noted.  RIGHT Lower extremity: Hip flexion 5/5, Hip Abduction 5/5, Hip Adduction 5/5, Knee extension 5/5, Knee flexion 5/5, Ankle  dorsiflexion5/5, Extensor hallucis longus 5/5, Ankle plantarflexion 5/5  LEFT Lower extremity:  Hip flexion 5/5, Hip Abduction 5/5,Hip Adduction 5/5, Knee extension 5/5, Knee flexion 5/5, Ankle dorsiflexion 5/5, Extensor hallucis longus 5/5, Ankle plantarflexion 5/5  -Normaltesting knee (patellar) jerk and ankle (achilles) jerk    NEURO: Bilateral upper and lower extremity coordination and muscle stretch reflexes are physiologic and symmetric. No loss of sensation is noted.  GAIT: normal.    Imaging:   CT lumbar spine 09/22/2021  FINDINGS:  Right total nephrectomy again noted.  Advanced atherosclerotic calcification.     Levoconvex lower lumbar scoliotic curvature secondary to asymmetric disc height loss on the right at L3-4 and L4-5.     T12-L1: Severe disc height loss with endplate sclerosis, vacuum disc phenomenon, and anterior osteophyte formation.  No spinal canal stenosis.  No significant right neural foraminal stenosis.  Moderate left neural foraminal stenosis.     L1-2: No significant disc height loss.  Hypertrophic facet arthropathy.     L2-3: Chronic ankylosis of L2-3.  Moderate right and mild left neural foraminal stenosis.  No spinal canal stenosis.  Ankylosis of posterior elements also noted.     L3-4: Severe disc height loss with endplate sclerosis and osteophyte formation.  Severe right and moderate to severe left neural foraminal stenosis.  Severe hypertrophic facet arthropathy.     L4-5: Laminectomy changes noted. Leftward chronic subluxation of L4 relative to L3 and L5 with right worse than left disc height loss with endplate sclerosis and osteophyte formation.  Right-sided L4 vertebral height loss.  Severe right and mild left neural foraminal stenosis.  Severe hypertrophic facet arthropathy.     L5-S1: No significant disc height loss.  Mild left neural foraminal stenosis.  Severe hypertrophic facet arthropathy.     Impression:   Severe chronic multilevel discogenic degenerative change and facet  arthropathy of the lumbar spine as described above.       09/25/19    X-Ray Lumbar Spine Complete 5 View    FINDINGS:  Scoliosis remains.  Vertebral body heights and alignment are stable.  Multilevel advanced degenerative disc height loss and osteophyte formation noted.  Multilevel facet arthropathy present more prevalent at the lower lumbar spine.  No acute osseous abnormality appreciated.  Aorta iliac atherosclerotic vascular calcifications noted.    08/26/14    X-Ray Cervical Spine AP And Lateral    Narrative  Findings: Vertebral alignment is normal.  There is narrowing of the disk spaces and  anterior osteophyte formation C 3-7.  There are no compression fractures or acute abnormalities are seen.  Carotid calcifications are noted bilaterally.    ASSESSMENT: 82 y.o. year old male with lower back and right leg pain, consistent with     1. Failed back surgical syndrome  Case Request-RAD/Other Procedure Area: Trial, Neurostimulator, Spinal Cord with RN IV sedation      2. Chronic pain syndrome  Case Request-RAD/Other Procedure Area: Trial, Neurostimulator, Spinal Cord with RN IV sedation            PLAN:   - Interventions:   - Interventions: Patient is a candidate for spinal cord stimulation device secondary to chronic pain syndrome and multiple failed attempts of other interventions and medical management. Explained the risks and benefits of the procedure in detail with the patient today in clinic along with alternative treatment options, and the patient elected to pursue the intervention at this time.    SCS protocol:  -Neurophys evaluation: Ms. Beasley 08/16/22  -CT thoracic spine: 07/06/22  -Cardiovascular clearance: 10/27/22  : Low cardiovascular risk, hold Eliquis 3 prior  -CBC/CMP: 12/22/22  -schedule for Nevro spinal cord stimulator trial      - Anticoagulation use: yes  Eliquis  Of note patient will have to pause Eliquis 3 days prior to spinal cord stimulator trial.  Will obtain cardiac  clearance.     report:  Reviewed and consistent with medication use as prescribed.      - Medications:  -increase Lyrica to see if this better controls neuropathic pain.  We discussed potential side effects of this medication which may include drowsiness,dizziness, dry mouth, constipation or peripheral edema.  -225 mg twice daily      We have discussed continuing judicious use of Tylenol, not to exceed 3000 mg daily to avoid acute hepatotoxicity.        - Therapy:   We discussed continuing physical therapy to help manage the patient/s painful condition. The patient was counseled that muscle strengthening will improve the long term prognosis in regards to pain and may also help increase range of motion and mobility.     - Imaging: Reviewed available imaging with patient and answered any questions they had regarding study     - Follow up visit: return to clinic 1 week after trial      The above plan and management options were discussed at length with patient. Patient is in agreement with the above and verbalized understanding.    - I discussed the goals of interventional chronic pain management with the patient on today's visit. We discussed a multimodal and systematic approach to pain.  This includes diagnostic and therapeutic injections, adjuvant pharmacologic treatment, physical therapy, and at times psychiatry.  I emphasized the importance of regular exercise, core strengthening and stretching, diet and weight loss as a cornerstone of long-term pain management.    - This condition does not require this patient to take time off of work, and the primary goal of our Pain Management services is to improve the patient's functional capacity.  - Patient Questions: Answered all of the patient's questions regarding diagnoses, therapy, treatment and next steps        Philipp Demarco MD  Interventional Pain Management  [facility]    Disclaimer:  This note was prepared using voice recognition system and is likely  to have sound alike errors that may have been overlooked even after proof reading.  Please call me with any questions

## 2022-12-28 NOTE — H&P (VIEW-ONLY)
Established Patient Chronic Pain Note     Referring Physician: No ref. provider found    PCP: Byron Stevens MD    Chief Complaint:   LBP     SUBJECTIVE:  Interval Hx: 12/29/22  Presents for five-month follow-up.  Today he reports overall he has been doing well after pacemaker surgery with Dr. Carrero, but continues to report significant lower back pain.  Today pain is rated a 6/10.  Patient has been taking 3-6 extra-strength Tylenol daily with marginal improvement in pain, takes the edge off.   To reiterate, patient has undergone multiple epidural steroid injections, medial branch blocks, radiofrequency ablation, physical therapy and failed treatment with anti-inflammatory and membrane stabilizing agents without significant relief.  Patient would like to pursue spinal cord stimulator trial.    Interval History (7/7/2022): Jake Ortiz presents today for follow-up visit.  he underwent Bilateral L3/4 TF XUAN on 06/08/2022.  The patient reports that he is/was unchanged following the procedure.  he reports 0% pain relief. Discussed SCS trial at previous visit, patient has read over brochures and spoken with friends/family that have an SCS and is interested in pursuing this procedure. Recently underwent pacemaker replacement on 06/16/2022.  Patient reports pain as 7/10 today. Reports continued low back pain without radiation. Reports last night he took 3 extra strength tylenol which temporarily mitigated his pain. Patient has undergone several injections including ESIs with varying levels and approaches, MBBs, RFAs, all with limited relief.      Interval history 05/10/2022  Patient presents for six-month follow-up.  He continues to report lower back pain at the level of L3-4 which radiates anteriorly.  Patient reports pain is exacerbated with prolonged standing and with ambulation.  Patient is interested in pursuing intervention.  Today patient denies significant lower extremity radiculopathy.  Again patient  reiterates no significant relief following prior lumbar medial branch blocks or radiofrequency ablation.  Patient has read spinal cord stimulation literature and would like to continue with transforaminal injection at this time.    Interval history 11/17/2020  Patient presents for CT lumbar spine review.  Today patient reports pain has been relatively well controlled.  Patient reports he was dancing earlier today.  When pain is severe patient reports pain in the lower back which radiates down the right lower extremity along the lateral distribution to the calf.  Patient denies significant weakness in the lower extremities or new onset bowel or bladder incontinence or gait ataxia.  Patient reviewed spinal cord stimulation educational material and would like to pursue pharmacologic management prior to trialing this.    HPI:  09/22/2021  Jake Ortiz is a 82 y.o. male with past medical history significant for chronic restrictive lung disease, lumbar spondylosis with radiculopathy status post L2-5 laminectomy, hypertension, hyperlipidemia, coronary artery disease status post myocardial infarctions status post triple-vessel CABG, atrial fibrillation, congestive heart failure status post percutaneous pacemaker placement, stage 4 chronic kidney disease, prostate adenocarcinoma, type 2 diabetes, GERD, obstructive sleep apnea, multi joint arthralgia who presents to the clinic for the evaluation of lower back and leg pain.  Patient reports longstanding history of lower back pain which is constant with radiation down the right lower extremity in L4-5 distribution to the calf.  Patient denies any radicular symptoms on the left.  Patient denies any significant weakness in bilateral lower extremities.  Pain is described as aching in nature and is a 7/10 today.  Pain at its worst is 10/10.  Pain is exacerbated with prolonged standing and with ambulation as well as with crossing his legs.  Patient is able to ambulate  approximately 100 ft before requiring rest.  Pain is improved with lumbar flexion, stretching.  Patient has received multiple prior interventions including medial branch block, radiofrequency ablation, epidural and caudal epidural steroid injection without meaningful relief.    Of note patient last saw Dr. Trivedi December 16, 2020 with recommendation for medial branch block and consideration of radiofrequency ablation.    Patient denies night fever/night sweats, urinary incontinence, bowel incontinence, significant weight loss, significant motor weakness and loss of sensations.    Pain Disability Index Review:     Last 3 PDI Scores 9/6/2022 7/6/2022 5/10/2022   Pain Disability Index (PDI) 49 15 42       Non-Pharmacologic Treatments:  Physical Therapy/Home Exercise: yes  Ice/Heat:yes  TENS: no  Acupuncture: no  Massage: no  Chiropractic: no    Other: no      Pain Medications:  - Adjuvant Medications: Clonazepam (Klonopin) and Tylenol (Acetaminophen)  - Anti-Coagulants: Aspirin, Plavix ( Clopidogrel) and Eliquis    Pain Procedures:   Surgeries:  Lumbar surgery with Dr. Pwoers with complications with staph infection causing 2 subsequent surgeries  Injections:  -June 4, 2021:  Left L3-5 lumbar radiofrequency ablation  -December 21, 2020:  Bilateral L3-5 medial branch block with steroid  - series of 3 injections (what sounds like ESIs) about 30 years ago with relief  - Lumbar Medial Branch Blocks and Lumbar Radiofrequency Ablation with limited relief  - L5/S1 IL XUAN on 11/22/19 with some pain relief for a few days  - L5/S1 IL XUAN on 1/6/2020 with limited pain relief for a few days  - caudal XUAN on 2/10/20 with 15% pain relief x 2 days  -10/12/2020 bilateral L3-5 medial branch blocks, 50% relief    Past Medical History:   Diagnosis Date    Abnormal PFT     Anemia     Arthritis     Atrial fibrillation 10/19/2017    Back pain     Congestive heart failure 03/05/2018    Coronary artery disease     DM (diabetes mellitus) 2018      am 06/23/2020    DM (diabetes mellitus) 2016     am 08/17/2021    Hyperlipemia     Hypertension     Myocardial infarction     NASREEN (obstructive sleep apnea) 06/05/2018    Prostate cancer 2015    Tobacco dependence     Type 2 diabetes mellitus      Past Surgical History:   Procedure Laterality Date    COLONOSCOPY N/A 9/22/2020    Procedure: COLONOSCOPY;  Surgeon: Javier Farmer MD;  Location: [facility];  Service: Endoscopy;  Laterality: N/A;    CORONARY ARTERY BYPASS GRAFT  1987    EPIDURAL STEROID INJECTION N/A 11/22/2019    Procedure: Lumbar L5/S1 IL XUAN;  Surgeon: Tacho Trivedi MD;  Location: [facility];  Service: Pain Management;  Laterality: N/A;    EPIDURAL STEROID INJECTION N/A 1/6/2020    Procedure: Lumbar L5/S1 IL XUAN;  Surgeon: Tacho Trivedi MD;  Location: [facility];  Service: Pain Management;  Laterality: N/A;    EPIDURAL STEROID INJECTION N/A 2/10/2020    Procedure: Caudal XUAN;  Surgeon: Tacho Trivedi MD;  Location: [facility];  Service: Pain Management;  Laterality: N/A;    ESOPHAGOGASTRODUODENOSCOPY N/A 9/22/2020    Procedure: EGD (ESOPHAGOGASTRODUODENOSCOPY);  Surgeon: Javier Farmer MD;  Location: [facility];  Service: Endoscopy;  Laterality: N/A;    INJECTION OF ANESTHETIC AGENT AROUND MEDIAL BRANCH NERVES INNERVATING LUMBAR FACET JOINT Bilateral 10/12/2020    Procedure: Bilateral L3-5 MBB;  Surgeon: Tacho Trivedi MD;  Location: [facility];  Service: Pain Management;  Laterality: Bilateral;    INJECTION OF ANESTHETIC AGENT AROUND MEDIAL BRANCH NERVES INNERVATING LUMBAR FACET JOINT Bilateral 12/21/2020    Procedure: Bilateral L3-5 MBB with steroid;  Surgeon: Tacho Trivedi MD;  Location: [facility];  Service: Pain Management;  Laterality: Bilateral;    INTRALUMINAL GASTROINTESTINAL TRACT IMAGING VIA CAPSULE N/A 12/29/2021    Procedure: IMAGING PROCEDURE, GI TRACT, INTRALUMINAL, VIA CAPSULE;  Surgeon: Tahir Geronimo RN;  Location:  [facility];  Service: Endoscopy;  Laterality: N/A;    PROSTATE SURGERY      prostate radiation    RADIOFREQUENCY THERMOCOAGULATION Left 6/4/2021    Procedure: Left L3-5 Lumbar RFA;  Surgeon: Tacho Trivedi MD;  Location: [facility]IN [facility]GT;  Service: Pain Management;  Laterality: Left;    RADIOFREQUENCY THERMOCOAGULATION Right 6/18/2021    Procedure: Right L3-5 Lumbar RFA;  Surgeon: Tacho Trivedi MD;  Location: [facility]IN [facility]GT;  Service: Pain Management;  Laterality: Right;    REPLACEMENT OF PACEMAKER GENERATOR Left 6/16/2022    Procedure: REPLACEMENT, PULSE GENERATOR, CARDIAC PACEMAKER/CRT-P device;  Surgeon: Darrel Carrero MD;  Location: [facility] CATH LAB;  Service: Cardiology;  Laterality: Left;  NOT MRI safe   Pacer and leads implanted 9/30/2017, Dr. Souleymane CARROLLT Consulta CRT-P C4TR01, [id number]   A lead: Mdt 5076 CapSureFix® Novus, [id number]   RV lead: Mdt 5076 CapSureFix® Novus, [id number]   LV lead: Mdt 4196 At    SELECTIVE INJECTION OF ANESTHETIC AGENT AROUND LUMBAR SPINAL NERVE ROOT BY TRANSFORAMINAL APPROACH Bilateral 6/8/2022    Procedure: Bilateral L3/4 TF XUAN with RN IV sedation;  Surgeon: Philipp Demarco MD;  Location: [facility] PAIN [facility]GT;  Service: Pain Management;  Laterality: Bilateral;    SPINE SURGERY      fusion    TONSILLECTOMY       Review of patient's allergies indicates:  No Known Allergies    Current Outpatient Medications   Medication Sig    acetaminophen (TYLENOL) 500 MG tablet Take 2 tablets (1,000 mg total) by mouth every 6 (six) hours as needed for Pain.    apixaban (ELIQUIS) 2.5 mg Tab Take 1 tablet (2.5 mg total) by mouth 2 (two) times daily.    atorvastatin (LIPITOR) 80 MG tablet Take 1 tablet (80 mg total) by mouth every evening.    blood sugar diagnostic Strp Check blood glucose levels daily in the AM fasting and 1-2 times more daily.    cefadroxil (DURICEF) 500 MG Cap Take 1 capsule (500 mg total) by mouth every 12 (twelve) hours.    cholecalciferol, vitamin D3, (VITAMIN D3) 25 mcg  (1,000 unit) capsule Take 1,000 Units by mouth once daily.    clonazePAM (KLONOPIN) 1 MG tablet Take 1 tablet (1 mg total) by mouth nightly as needed for Anxiety.    fluticasone propionate (FLONASE) 50 mcg/actuation nasal spray USE 2 SPRAYS IN EACH NOSTRIL EVERY DAY    furosemide (LASIX) 40 MG tablet Take 1 tablet (40 mg total) by mouth 2 (two) times daily. Can double to 2 tablets twice a day for 3 days for more swelling/fluid build up - take 80mg twice a day    krill/om-3/dha/epa/phospho/ast (MEGARED OMEGA-3 KRILL OIL ORAL) Take 1 capsule by mouth every morning.    lancets Misc Check blood glucose levels daily in the AM fasting and 1-2 times more daily.    levocetirizine (XYZAL) 5 MG tablet TAKE 1 TABLET EVERY EVENING    losartan (COZAAR) 50 MG tablet Take 1 tablet (50 mg total) by mouth once daily.    magnesium oxide (MAG-OX) 400 mg (241.3 mg magnesium) tablet Take 1 tablet (400 mg total) by mouth once daily.    multivitamin capsule Take 1 capsule by mouth once daily.    pantoprazole (PROTONIX) 40 MG tablet Take 1 tablet (40 mg total) by mouth once daily.    potassium chloride SA (K-DUR,KLOR-CON) 20 MEQ tablet Take 1 tablet (20 mEq total) by mouth once daily.    pregabalin (LYRICA) 225 MG Cap Take 1 capsule (225 mg total) by mouth 2 (two) times daily.    pyridoxine, vitamin B6, (B-6) 100 MG Tab Take 50 mg by mouth once daily.    spironolactone (ALDACTONE) 25 MG tablet Take 1 tablet (25 mg total) by mouth once daily.    vit A/vit C/vit E/zinc/copper (OCUVITE PRESERVISION ORAL) Take 1 capsule by mouth every evening.     No current facility-administered medications for this visit.     Facility-Administered Medications Ordered in Other Visits   Medication    ondansetron injection 4 mg       Review of Systems     GENERAL:  No weight loss, malaise or fevers.  HEENT:   No recent changes in vision or hearing  NECK:  Negative for lumps, no difficulty with swallowing.  RESPIRATORY:  Negative for cough, wheezing or shortness  of breath, patient denies any recent URI.  CARDIOVASCULAR:  Negative for chest pain, leg swelling or palpitations.  GI:  Negative for abdominal discomfort, blood in stools or black stools or change in bowel habits.  MUSCULOSKELETAL:  See HPI.  SKIN:  Negative for lesions, rash, and itching.  PSYCH:  No mood disorder or recent psychosocial stressors.   HEMATOLOGY/LYMPHOLOGY:  Negative for prolonged bleeding, bruising easily or swollen nodes.    NEURO:   No history of headaches, syncope, paralysis, seizures or tremors.  All other reviewed and negative other than HPI.    OBJECTIVE:    There were no vitals taken for this visit.      Physical Exam    GENERAL: Well appearing, in no acute distress, alert and oriented x3.  PSYCH:  Mood and affect appropriate.  SKIN: Skin color, texture, turgor normal, no rashes or lesions.  HEAD/FACE:  Normocephalic, atraumatic. Cranial nerves grossly intact.    CV: RRR with palpation of the radial artery.  PULM: No evidence of respiratory difficulty, symmetric chest rise.  GI:  Soft and non-tender.    BACK: Straight leg raising in the sitting and supine positions is negative to radicular pain. No pain to palpation over the facet joints of the lumbar spine or spinous processes. Normal range of motion without pain reproduction.  EXTREMITIES: Peripheral joint ROM is full and pain free without obvious instability or laxity in all four extremities. No deformities, edema, or skin discoloration. Good capillary refill.  MUSCULOSKELETAL: Able to stand on heels & toes.   Shoulder, hip, and knee provocative maneuvers are negative.  There is no pain with palpation over the sacroiliac joints bilaterally.  FABERs test is negative.  FAER test is negative bilaterally.   Bilateral upper and lower extremity strength is normal and symmetric.  No atrophy or tone abnormalities are noted.  RIGHT Lower extremity: Hip flexion 5/5, Hip Abduction 5/5, Hip Adduction 5/5, Knee extension 5/5, Knee flexion 5/5, Ankle  dorsiflexion5/5, Extensor hallucis longus 5/5, Ankle plantarflexion 5/5  LEFT Lower extremity:  Hip flexion 5/5, Hip Abduction 5/5,Hip Adduction 5/5, Knee extension 5/5, Knee flexion 5/5, Ankle dorsiflexion 5/5, Extensor hallucis longus 5/5, Ankle plantarflexion 5/5  -Normaltesting knee (patellar) jerk and ankle (achilles) jerk    NEURO: Bilateral upper and lower extremity coordination and muscle stretch reflexes are physiologic and symmetric. No loss of sensation is noted.  GAIT: normal.    Imaging:   CT lumbar spine 09/22/2021  FINDINGS:  Right total nephrectomy again noted.  Advanced atherosclerotic calcification.     Levoconvex lower lumbar scoliotic curvature secondary to asymmetric disc height loss on the right at L3-4 and L4-5.     T12-L1: Severe disc height loss with endplate sclerosis, vacuum disc phenomenon, and anterior osteophyte formation.  No spinal canal stenosis.  No significant right neural foraminal stenosis.  Moderate left neural foraminal stenosis.     L1-2: No significant disc height loss.  Hypertrophic facet arthropathy.     L2-3: Chronic ankylosis of L2-3.  Moderate right and mild left neural foraminal stenosis.  No spinal canal stenosis.  Ankylosis of posterior elements also noted.     L3-4: Severe disc height loss with endplate sclerosis and osteophyte formation.  Severe right and moderate to severe left neural foraminal stenosis.  Severe hypertrophic facet arthropathy.     L4-5: Laminectomy changes noted. Leftward chronic subluxation of L4 relative to L3 and L5 with right worse than left disc height loss with endplate sclerosis and osteophyte formation.  Right-sided L4 vertebral height loss.  Severe right and mild left neural foraminal stenosis.  Severe hypertrophic facet arthropathy.     L5-S1: No significant disc height loss.  Mild left neural foraminal stenosis.  Severe hypertrophic facet arthropathy.     Impression:   Severe chronic multilevel discogenic degenerative change and facet  arthropathy of the lumbar spine as described above.       09/25/19    X-Ray Lumbar Spine Complete 5 View    FINDINGS:  Scoliosis remains.  Vertebral body heights and alignment are stable.  Multilevel advanced degenerative disc height loss and osteophyte formation noted.  Multilevel facet arthropathy present more prevalent at the lower lumbar spine.  No acute osseous abnormality appreciated.  Aorta iliac atherosclerotic vascular calcifications noted.    08/26/14    X-Ray Cervical Spine AP And Lateral    Narrative  Findings: Vertebral alignment is normal.  There is narrowing of the disk spaces and  anterior osteophyte formation C 3-7.  There are no compression fractures or acute abnormalities are seen.  Carotid calcifications are noted bilaterally.    ASSESSMENT: 82 y.o. year old male with lower back and right leg pain, consistent with     1. Failed back surgical syndrome  Case Request-RAD/Other Procedure Area: Trial, Neurostimulator, Spinal Cord with RN IV sedation      2. Chronic pain syndrome  Case Request-RAD/Other Procedure Area: Trial, Neurostimulator, Spinal Cord with RN IV sedation            PLAN:   - Interventions:   - Interventions: Patient is a candidate for spinal cord stimulation device secondary to chronic pain syndrome and multiple failed attempts of other interventions and medical management. Explained the risks and benefits of the procedure in detail with the patient today in clinic along with alternative treatment options, and the patient elected to pursue the intervention at this time.    SCS protocol:  -Neurophys evaluation: Ms. Beasley 08/16/22  -CT thoracic spine: 07/06/22  -Cardiovascular clearance: 10/27/22  : Low cardiovascular risk, hold Eliquis 3 prior  -CBC/CMP: 12/22/22  -schedule for [facility] spinal cord stimulator trial      - Anticoagulation use: yes  Eliquis  Of note patient will have to pause Eliquis 3 days prior to spinal cord stimulator trial.  Will obtain cardiac  clearance.     report:  Reviewed and consistent with medication use as prescribed.      - Medications:  -increase Lyrica to see if this better controls neuropathic pain.  We discussed potential side effects of this medication which may include drowsiness,dizziness, dry mouth, constipation or peripheral edema.  -225 mg twice daily      We have discussed continuing judicious use of Tylenol, not to exceed 3000 mg daily to avoid acute hepatotoxicity.        - Therapy:   We discussed continuing physical therapy to help manage the patient/s painful condition. The patient was counseled that muscle strengthening will improve the long term prognosis in regards to pain and may also help increase range of motion and mobility.     - Imaging: Reviewed available imaging with patient and answered any questions they had regarding study     - Follow up visit: return to clinic 1 week after trial      The above plan and management options were discussed at length with patient. Patient is in agreement with the above and verbalized understanding.    - I discussed the goals of interventional chronic pain management with the patient on today's visit. We discussed a multimodal and systematic approach to pain.  This includes diagnostic and therapeutic injections, adjuvant pharmacologic treatment, physical therapy, and at times psychiatry.  I emphasized the importance of regular exercise, core strengthening and stretching, diet and weight loss as a cornerstone of long-term pain management.    - This condition does not require this patient to take time off of work, and the primary goal of our Pain Management services is to improve the patient's functional capacity.  - Patient Questions: Answered all of the patient's questions regarding diagnoses, therapy, treatment and next steps        Philipp Demarco MD  Interventional Pain Management  [facility]    Disclaimer:  This note was prepared using voice recognition system and is likely  to have sound alike errors that may have been overlooked even after proof reading.  Please call me with any questions

## 2023-01-01 ENCOUNTER — HOSPITAL ENCOUNTER (OUTPATIENT)
Dept: RADIOLOGY | Facility: HOSPITAL | Age: 83
Discharge: HOME | DRG: 329 | End: 2023-06-21
Attending: PHYSICIAN ASSISTANT
Payer: MEDICARE

## 2023-01-01 ENCOUNTER — TELEPHONE (OUTPATIENT)
Dept: NEPHROLOGY | Facility: CLINIC | Age: 83
End: 2023-01-01
Payer: MEDICARE

## 2023-01-01 ENCOUNTER — PATIENT MESSAGE (OUTPATIENT)
Dept: SURGERY | Facility: HOSPITAL | Age: 83
End: 2023-01-01

## 2023-01-01 ENCOUNTER — LAB VISIT (OUTPATIENT)
Dept: LAB | Facility: HOSPITAL | Age: 83
End: 2023-01-01
Payer: MEDICARE

## 2023-01-01 ENCOUNTER — HOSPITAL ENCOUNTER (OUTPATIENT)
Dept: CARDIOLOGY | Facility: HOSPITAL | Age: 83
Discharge: HOME | End: 2023-03-28
Attending: INTERNAL MEDICINE
Payer: MEDICARE

## 2023-01-01 ENCOUNTER — PATIENT MESSAGE (OUTPATIENT)
Dept: PAIN MEDICINE | Facility: CLINIC | Age: 83
End: 2023-01-01
Payer: MEDICARE

## 2023-01-01 ENCOUNTER — PATIENT MESSAGE (OUTPATIENT)
Dept: CARDIOLOGY | Facility: CLINIC | Age: 83
End: 2023-01-01
Payer: MEDICARE

## 2023-01-01 ENCOUNTER — HOSPITAL ENCOUNTER (INPATIENT)
Facility: HOSPITAL | Age: 83
LOS: 1 days | Discharge: NURSING, REHAB OR LONG-TERM CARE | DRG: 682 | End: 2023-05-16
Attending: EMERGENCY MEDICINE | Admitting: FAMILY MEDICINE
Payer: MEDICARE

## 2023-01-01 ENCOUNTER — TELEPHONE (OUTPATIENT)
Dept: CARDIOLOGY | Facility: HOSPITAL | Age: 83
End: 2023-01-01
Payer: MEDICARE

## 2023-01-01 ENCOUNTER — ANESTHESIA (OUTPATIENT)
Dept: SURGERY | Facility: HOSPITAL | Age: 83
DRG: 329 | End: 2023-01-01
Payer: MEDICARE

## 2023-01-01 ENCOUNTER — ANESTHESIA (OUTPATIENT)
Dept: SURGERY | Facility: HOSPITAL | Age: 83
End: 2023-01-01
Payer: MEDICARE

## 2023-01-01 ENCOUNTER — TELEPHONE (OUTPATIENT)
Dept: PREADMISSION TESTING | Facility: HOSPITAL | Age: 83
End: 2023-01-01
Payer: MEDICARE

## 2023-01-01 ENCOUNTER — HOSPITAL ENCOUNTER (OUTPATIENT)
Facility: HOSPITAL | Age: 83
Discharge: HOME | End: 2023-01-25
Attending: ANESTHESIOLOGY | Admitting: ANESTHESIOLOGY
Payer: MEDICARE

## 2023-01-01 ENCOUNTER — OFFICE VISIT (OUTPATIENT)
Dept: PAIN MEDICINE | Facility: CLINIC | Age: 83
End: 2023-01-01
Payer: MEDICARE

## 2023-01-01 ENCOUNTER — TELEPHONE (OUTPATIENT)
Dept: CARDIOLOGY | Facility: CLINIC | Age: 83
End: 2023-01-01
Payer: MEDICARE

## 2023-01-01 ENCOUNTER — HOSPITAL ENCOUNTER (OUTPATIENT)
Dept: RADIOLOGY | Facility: HOSPITAL | Age: 83
Discharge: HOME | End: 2023-05-01
Attending: ANESTHESIOLOGY
Payer: MEDICARE

## 2023-01-01 ENCOUNTER — PATIENT MESSAGE (OUTPATIENT)
Dept: GASTROENTEROLOGY | Facility: CLINIC | Age: 83
End: 2023-01-01
Payer: MEDICARE

## 2023-01-01 ENCOUNTER — CLINICAL SUPPORT (OUTPATIENT)
Dept: CARDIOLOGY | Facility: HOSPITAL | Age: 83
End: 2023-01-01
Payer: MEDICARE

## 2023-01-01 ENCOUNTER — LAB VISIT (OUTPATIENT)
Dept: LAB | Facility: HOSPITAL | Age: 83
End: 2023-01-01
Attending: FAMILY MEDICINE
Payer: MEDICARE

## 2023-01-01 ENCOUNTER — HOSPITAL ENCOUNTER (INPATIENT)
Facility: HOSPITAL | Age: 83
LOS: 4 days | Discharge: HOME | DRG: 291 | End: 2023-06-13
Attending: EMERGENCY MEDICINE | Admitting: INTERNAL MEDICINE
Payer: MEDICARE

## 2023-01-01 ENCOUNTER — OFFICE VISIT (OUTPATIENT)
Dept: HEMATOLOGY/ONCOLOGY | Facility: CLINIC | Age: 83
End: 2023-01-01
Payer: MEDICARE

## 2023-01-01 ENCOUNTER — OFFICE VISIT (OUTPATIENT)
Dept: CARDIOLOGY | Facility: CLINIC | Age: 83
End: 2023-01-01
Payer: MEDICARE

## 2023-01-01 ENCOUNTER — OFFICE VISIT (OUTPATIENT)
Dept: FAMILY MEDICINE | Facility: CLINIC | Age: 83
DRG: 291 | End: 2023-01-01
Payer: MEDICARE

## 2023-01-01 ENCOUNTER — PATIENT OUTREACH (OUTPATIENT)
Dept: EMERGENCY MEDICINE | Facility: HOSPITAL | Age: 83
End: 2023-01-01
Payer: MEDICARE

## 2023-01-01 ENCOUNTER — HOSPITAL ENCOUNTER (INPATIENT)
Facility: HOSPITAL | Age: 83
LOS: 6 days | DRG: 329 | End: 2023-06-29
Attending: EMERGENCY MEDICINE | Admitting: FAMILY MEDICINE
Payer: MEDICARE

## 2023-01-01 ENCOUNTER — TELEPHONE (OUTPATIENT)
Dept: PAIN MEDICINE | Facility: CLINIC | Age: 83
End: 2023-01-01
Payer: MEDICARE

## 2023-01-01 ENCOUNTER — TELEPHONE (OUTPATIENT)
Dept: ADMINISTRATIVE | Facility: CLINIC | Age: 83
End: 2023-01-01
Payer: MEDICARE

## 2023-01-01 ENCOUNTER — HOSPITAL ENCOUNTER (OUTPATIENT)
Dept: RADIOLOGY | Facility: HOSPITAL | Age: 83
Discharge: HOME | End: 2023-06-16
Attending: FAMILY MEDICINE
Payer: MEDICARE

## 2023-01-01 ENCOUNTER — PATIENT MESSAGE (OUTPATIENT)
Dept: PAIN MEDICINE | Facility: HOSPITAL | Age: 83
End: 2023-01-01
Payer: MEDICARE

## 2023-01-01 ENCOUNTER — ANESTHESIA EVENT (OUTPATIENT)
Dept: SURGERY | Facility: HOSPITAL | Age: 83
End: 2023-01-01
Payer: MEDICARE

## 2023-01-01 ENCOUNTER — PATIENT MESSAGE (OUTPATIENT)
Dept: HEMATOLOGY/ONCOLOGY | Facility: CLINIC | Age: 83
End: 2023-01-01
Payer: MEDICARE

## 2023-01-01 ENCOUNTER — PATIENT MESSAGE (OUTPATIENT)
Dept: FAMILY MEDICINE | Facility: CLINIC | Age: 83
End: 2023-01-01
Payer: MEDICARE

## 2023-01-01 ENCOUNTER — PATIENT OUTREACH (OUTPATIENT)
Dept: ADMINISTRATIVE | Facility: CLINIC | Age: 83
End: 2023-01-01
Payer: MEDICARE

## 2023-01-01 ENCOUNTER — OFFICE VISIT (OUTPATIENT)
Dept: FAMILY MEDICINE | Facility: CLINIC | Age: 83
End: 2023-01-01
Payer: MEDICARE

## 2023-01-01 ENCOUNTER — ANESTHESIA EVENT (OUTPATIENT)
Dept: SURGERY | Facility: HOSPITAL | Age: 83
DRG: 329 | End: 2023-01-01
Payer: MEDICARE

## 2023-01-01 ENCOUNTER — PATIENT OUTREACH (OUTPATIENT)
Dept: ADMINISTRATIVE | Facility: HOSPITAL | Age: 83
End: 2023-01-01
Payer: MEDICARE

## 2023-01-01 ENCOUNTER — OFFICE VISIT (OUTPATIENT)
Dept: INTERNAL MEDICINE | Facility: CLINIC | Age: 83
End: 2023-01-01
Payer: MEDICARE

## 2023-01-01 ENCOUNTER — INFUSION (OUTPATIENT)
Dept: INFUSION THERAPY | Facility: HOSPITAL | Age: 83
End: 2023-01-01
Attending: NURSE PRACTITIONER
Payer: MEDICARE

## 2023-01-01 ENCOUNTER — HOSPITAL ENCOUNTER (OUTPATIENT)
Facility: HOSPITAL | Age: 83
Discharge: HOME | End: 2023-03-23
Attending: ANESTHESIOLOGY | Admitting: ANESTHESIOLOGY
Payer: MEDICARE

## 2023-01-01 ENCOUNTER — HOSPITAL ENCOUNTER (EMERGENCY)
Facility: HOSPITAL | Age: 83
Discharge: HOME | End: 2023-05-29
Attending: EMERGENCY MEDICINE
Payer: MEDICARE

## 2023-01-01 ENCOUNTER — OFFICE VISIT (OUTPATIENT)
Dept: CARDIOLOGY | Facility: CLINIC | Age: 83
DRG: 291 | End: 2023-01-01
Payer: MEDICARE

## 2023-01-01 ENCOUNTER — NURSE TRIAGE (OUTPATIENT)
Dept: ADMINISTRATIVE | Facility: CLINIC | Age: 83
End: 2023-01-01
Payer: MEDICARE

## 2023-01-01 ENCOUNTER — OFFICE VISIT (OUTPATIENT)
Dept: GASTROENTEROLOGY | Facility: CLINIC | Age: 83
DRG: 329 | End: 2023-01-01
Payer: MEDICARE

## 2023-01-01 ENCOUNTER — INFUSION (OUTPATIENT)
Dept: INFUSION THERAPY | Facility: HOSPITAL | Age: 83
End: 2023-01-01
Attending: INTERNAL MEDICINE
Payer: MEDICARE

## 2023-01-01 ENCOUNTER — HOSPITAL ENCOUNTER (OUTPATIENT)
Dept: CARDIOLOGY | Facility: HOSPITAL | Age: 83
Discharge: HOME | End: 2023-05-03
Attending: INTERNAL MEDICINE
Payer: MEDICARE

## 2023-01-01 ENCOUNTER — TELEPHONE (OUTPATIENT)
Dept: ADMINISTRATIVE | Facility: HOSPITAL | Age: 83
End: 2023-01-01
Payer: MEDICARE

## 2023-01-01 VITALS
OXYGEN SATURATION: 96 % | DIASTOLIC BLOOD PRESSURE: 66 MMHG | SYSTOLIC BLOOD PRESSURE: 109 MMHG | HEIGHT: 69 IN | BODY MASS INDEX: 29.84 KG/M2 | SYSTOLIC BLOOD PRESSURE: 126 MMHG | HEART RATE: 78 BPM | WEIGHT: 200.81 LBS | BODY MASS INDEX: 28.75 KG/M2 | RESPIRATION RATE: 17 BRPM | OXYGEN SATURATION: 98 % | HEIGHT: 70 IN | DIASTOLIC BLOOD PRESSURE: 61 MMHG | TEMPERATURE: 97 F | TEMPERATURE: 97 F | HEART RATE: 84 BPM | SYSTOLIC BLOOD PRESSURE: 124 MMHG | RESPIRATION RATE: 18 BRPM | DIASTOLIC BLOOD PRESSURE: 76 MMHG | WEIGHT: 201.5 LBS | HEART RATE: 70 BPM

## 2023-01-01 VITALS
DIASTOLIC BLOOD PRESSURE: 64 MMHG | SYSTOLIC BLOOD PRESSURE: 122 MMHG | HEART RATE: 79 BPM | OXYGEN SATURATION: 96 % | WEIGHT: 206.38 LBS | BODY MASS INDEX: 29.54 KG/M2 | TEMPERATURE: 98 F | HEIGHT: 70 IN

## 2023-01-01 VITALS
WEIGHT: 201.94 LBS | RESPIRATION RATE: 18 BRPM | HEART RATE: 92 BPM | TEMPERATURE: 98 F | HEIGHT: 70 IN | BODY MASS INDEX: 28.91 KG/M2 | DIASTOLIC BLOOD PRESSURE: 63 MMHG | SYSTOLIC BLOOD PRESSURE: 133 MMHG | OXYGEN SATURATION: 98 %

## 2023-01-01 VITALS
TEMPERATURE: 101 F | SYSTOLIC BLOOD PRESSURE: 134 MMHG | OXYGEN SATURATION: 94 % | OXYGEN SATURATION: 97 % | DIASTOLIC BLOOD PRESSURE: 60 MMHG | BODY MASS INDEX: 32.36 KG/M2 | HEART RATE: 74 BPM | BODY MASS INDEX: 30.74 KG/M2 | WEIGHT: 225.5 LBS | DIASTOLIC BLOOD PRESSURE: 74 MMHG | SYSTOLIC BLOOD PRESSURE: 140 MMHG | HEIGHT: 70 IN | HEART RATE: 89 BPM | WEIGHT: 214.75 LBS

## 2023-01-01 VITALS
BODY MASS INDEX: 32.21 KG/M2 | DIASTOLIC BLOOD PRESSURE: 72 MMHG | DIASTOLIC BLOOD PRESSURE: 60 MMHG | HEART RATE: 80 BPM | SYSTOLIC BLOOD PRESSURE: 130 MMHG | SYSTOLIC BLOOD PRESSURE: 139 MMHG | HEART RATE: 61 BPM | WEIGHT: 225 LBS | TEMPERATURE: 99 F | OXYGEN SATURATION: 97 % | HEIGHT: 70 IN

## 2023-01-01 VITALS
TEMPERATURE: 98 F | SYSTOLIC BLOOD PRESSURE: 109 MMHG | OXYGEN SATURATION: 94 % | RESPIRATION RATE: 18 BRPM | HEART RATE: 70 BPM | BODY MASS INDEX: 31.62 KG/M2 | WEIGHT: 220.88 LBS | DIASTOLIC BLOOD PRESSURE: 54 MMHG | HEIGHT: 70 IN

## 2023-01-01 VITALS
RESPIRATION RATE: 17 BRPM | HEIGHT: 69 IN | SYSTOLIC BLOOD PRESSURE: 131 MMHG | WEIGHT: 212.31 LBS | HEART RATE: 76 BPM | DIASTOLIC BLOOD PRESSURE: 86 MMHG | BODY MASS INDEX: 31.44 KG/M2

## 2023-01-01 VITALS
BODY MASS INDEX: 31.5 KG/M2 | TEMPERATURE: 98 F | SYSTOLIC BLOOD PRESSURE: 142 MMHG | WEIGHT: 220 LBS | HEART RATE: 66 BPM | RESPIRATION RATE: 17 BRPM | HEIGHT: 70 IN | OXYGEN SATURATION: 97 % | DIASTOLIC BLOOD PRESSURE: 77 MMHG

## 2023-01-01 VITALS
WEIGHT: 194.69 LBS | SYSTOLIC BLOOD PRESSURE: 117 MMHG | HEART RATE: 90 BPM | BODY MASS INDEX: 28.84 KG/M2 | HEIGHT: 69 IN | DIASTOLIC BLOOD PRESSURE: 65 MMHG

## 2023-01-01 VITALS
DIASTOLIC BLOOD PRESSURE: 65 MMHG | SYSTOLIC BLOOD PRESSURE: 136 MMHG | HEIGHT: 69 IN | TEMPERATURE: 98 F | OXYGEN SATURATION: 99 % | HEART RATE: 65 BPM | BODY MASS INDEX: 29.54 KG/M2 | WEIGHT: 199.44 LBS | RESPIRATION RATE: 18 BRPM

## 2023-01-01 VITALS
HEART RATE: 82 BPM | SYSTOLIC BLOOD PRESSURE: 137 MMHG | WEIGHT: 200 LBS | RESPIRATION RATE: 20 BRPM | SYSTOLIC BLOOD PRESSURE: 146 MMHG | HEIGHT: 70 IN | RESPIRATION RATE: 17 BRPM | DIASTOLIC BLOOD PRESSURE: 75 MMHG | HEIGHT: 70 IN | BODY MASS INDEX: 29.67 KG/M2 | HEART RATE: 64 BPM | OXYGEN SATURATION: 95 % | WEIGHT: 207.25 LBS | TEMPERATURE: 97 F | DIASTOLIC BLOOD PRESSURE: 68 MMHG | BODY MASS INDEX: 28.63 KG/M2

## 2023-01-01 VITALS
OXYGEN SATURATION: 98 % | BODY MASS INDEX: 30.76 KG/M2 | TEMPERATURE: 97 F | WEIGHT: 207.69 LBS | HEIGHT: 69 IN | DIASTOLIC BLOOD PRESSURE: 62 MMHG | SYSTOLIC BLOOD PRESSURE: 113 MMHG | HEART RATE: 88 BPM

## 2023-01-01 VITALS
TEMPERATURE: 97 F | WEIGHT: 203 LBS | HEIGHT: 69 IN | SYSTOLIC BLOOD PRESSURE: 128 MMHG | OXYGEN SATURATION: 98 % | DIASTOLIC BLOOD PRESSURE: 69 MMHG | BODY MASS INDEX: 30.07 KG/M2 | HEART RATE: 63 BPM | OXYGEN SATURATION: 99 % | SYSTOLIC BLOOD PRESSURE: 119 MMHG | BODY MASS INDEX: 29.73 KG/M2 | HEART RATE: 84 BPM | WEIGHT: 207.69 LBS | HEIGHT: 70 IN | DIASTOLIC BLOOD PRESSURE: 68 MMHG

## 2023-01-01 VITALS
HEART RATE: 90 BPM | TEMPERATURE: 97 F | DIASTOLIC BLOOD PRESSURE: 74 MMHG | RESPIRATION RATE: 16 BRPM | OXYGEN SATURATION: 99 % | SYSTOLIC BLOOD PRESSURE: 121 MMHG

## 2023-01-01 VITALS
OXYGEN SATURATION: 98 % | TEMPERATURE: 98 F | RESPIRATION RATE: 18 BRPM | HEART RATE: 90 BPM | DIASTOLIC BLOOD PRESSURE: 55 MMHG | SYSTOLIC BLOOD PRESSURE: 138 MMHG | DIASTOLIC BLOOD PRESSURE: 75 MMHG | SYSTOLIC BLOOD PRESSURE: 127 MMHG | OXYGEN SATURATION: 98 % | BODY MASS INDEX: 31.42 KG/M2 | HEART RATE: 82 BPM | WEIGHT: 212.75 LBS

## 2023-01-01 VITALS
HEIGHT: 70 IN | HEART RATE: 89 BPM | BODY MASS INDEX: 29.1 KG/M2 | OXYGEN SATURATION: 97 % | SYSTOLIC BLOOD PRESSURE: 138 MMHG | WEIGHT: 203.25 LBS | DIASTOLIC BLOOD PRESSURE: 76 MMHG

## 2023-01-01 VITALS
DIASTOLIC BLOOD PRESSURE: 62 MMHG | OXYGEN SATURATION: 98 % | TEMPERATURE: 97 F | SYSTOLIC BLOOD PRESSURE: 113 MMHG | HEART RATE: 88 BPM | RESPIRATION RATE: 16 BRPM

## 2023-01-01 VITALS
DIASTOLIC BLOOD PRESSURE: 70 MMHG | RESPIRATION RATE: 18 BRPM | OXYGEN SATURATION: 99 % | TEMPERATURE: 97 F | SYSTOLIC BLOOD PRESSURE: 134 MMHG | BODY MASS INDEX: 29.88 KG/M2 | HEIGHT: 69 IN | HEART RATE: 75 BPM | WEIGHT: 201.75 LBS

## 2023-01-01 VITALS
SYSTOLIC BLOOD PRESSURE: 134 MMHG | HEART RATE: 75 BPM | OXYGEN SATURATION: 99 % | DIASTOLIC BLOOD PRESSURE: 70 MMHG | RESPIRATION RATE: 18 BRPM | TEMPERATURE: 97 F

## 2023-01-01 VITALS
SYSTOLIC BLOOD PRESSURE: 129 MMHG | WEIGHT: 214.06 LBS | OXYGEN SATURATION: 97 % | BODY MASS INDEX: 32.44 KG/M2 | DIASTOLIC BLOOD PRESSURE: 59 MMHG | RESPIRATION RATE: 18 BRPM | HEART RATE: 60 BPM | HEIGHT: 68 IN | TEMPERATURE: 98 F

## 2023-01-01 DIAGNOSIS — Z76.89 ENCOUNTER TO ESTABLISH CARE: ICD-10-CM

## 2023-01-01 DIAGNOSIS — T85.192A MALFUNCTION OF SPINAL CORD STIMULATOR, INITIAL ENCOUNTER: Primary | ICD-10-CM

## 2023-01-01 DIAGNOSIS — T85.192A MALFUNCTION OF SPINAL CORD STIMULATOR, INITIAL ENCOUNTER: ICD-10-CM

## 2023-01-01 DIAGNOSIS — M77.12 LATERAL EPICONDYLITIS OF LEFT ELBOW: ICD-10-CM

## 2023-01-01 DIAGNOSIS — I10 ESSENTIAL HYPERTENSION: ICD-10-CM

## 2023-01-01 DIAGNOSIS — M96.1 FAILED BACK SURGICAL SYNDROME: Primary | ICD-10-CM

## 2023-01-01 DIAGNOSIS — K52.9 CHRONIC DIARRHEA: ICD-10-CM

## 2023-01-01 DIAGNOSIS — E11.69 HYPERLIPIDEMIA ASSOCIATED WITH TYPE 2 DIABETES MELLITUS: ICD-10-CM

## 2023-01-01 DIAGNOSIS — D63.1 ANEMIA IN STAGE 4 CHRONIC KIDNEY DISEASE: Primary | ICD-10-CM

## 2023-01-01 DIAGNOSIS — I50.42 CHRONIC COMBINED SYSTOLIC AND DIASTOLIC CONGESTIVE HEART FAILURE: ICD-10-CM

## 2023-01-01 DIAGNOSIS — D63.1 ANEMIA ASSOCIATED WITH STAGE 4 CHRONIC RENAL FAILURE: Primary | ICD-10-CM

## 2023-01-01 DIAGNOSIS — K65.9 PERITONITIS: ICD-10-CM

## 2023-01-01 DIAGNOSIS — I48.0 PAROXYSMAL ATRIAL FIBRILLATION: ICD-10-CM

## 2023-01-01 DIAGNOSIS — Z95.0 PRESENCE OF CARDIAC RESYNCHRONIZATION THERAPY PACEMAKER (CRT-P): ICD-10-CM

## 2023-01-01 DIAGNOSIS — R79.89 ELEVATED TROPONIN: ICD-10-CM

## 2023-01-01 DIAGNOSIS — M54.16 LUMBAR RADICULOPATHY, CHRONIC: ICD-10-CM

## 2023-01-01 DIAGNOSIS — N25.81 SECONDARY HYPERPARATHYROIDISM OF RENAL ORIGIN: ICD-10-CM

## 2023-01-01 DIAGNOSIS — K35.32 PERFORATED APPENDICITIS: Primary | ICD-10-CM

## 2023-01-01 DIAGNOSIS — N18.4 ANEMIA ASSOCIATED WITH STAGE 4 CHRONIC RENAL FAILURE: Primary | ICD-10-CM

## 2023-01-01 DIAGNOSIS — R07.9 CHEST PAIN: ICD-10-CM

## 2023-01-01 DIAGNOSIS — I47.20 V-TACH: ICD-10-CM

## 2023-01-01 DIAGNOSIS — R71.8 ELEVATED MCV: ICD-10-CM

## 2023-01-01 DIAGNOSIS — G89.4 CHRONIC PAIN SYNDROME: ICD-10-CM

## 2023-01-01 DIAGNOSIS — G47.33 OBSTRUCTIVE SLEEP APNEA: ICD-10-CM

## 2023-01-01 DIAGNOSIS — E11.22 DIABETES MELLITUS WITH STAGE 3 CHRONIC KIDNEY DISEASE: ICD-10-CM

## 2023-01-01 DIAGNOSIS — I48.11 LONGSTANDING PERSISTENT ATRIAL FIBRILLATION: ICD-10-CM

## 2023-01-01 DIAGNOSIS — D63.1 ANEMIA ASSOCIATED WITH STAGE 4 CHRONIC RENAL FAILURE: ICD-10-CM

## 2023-01-01 DIAGNOSIS — N18.4 ANEMIA IN STAGE 4 CHRONIC KIDNEY DISEASE: ICD-10-CM

## 2023-01-01 DIAGNOSIS — I70.0 ARTERIOSCLEROSIS OF AORTA: ICD-10-CM

## 2023-01-01 DIAGNOSIS — Z95.1 S/P CABG X 3: ICD-10-CM

## 2023-01-01 DIAGNOSIS — I50.9 ACUTE ON CHRONIC CONGESTIVE HEART FAILURE, UNSPECIFIED HEART FAILURE TYPE: Primary | ICD-10-CM

## 2023-01-01 DIAGNOSIS — E03.8 OTHER SPECIFIED HYPOTHYROIDISM: ICD-10-CM

## 2023-01-01 DIAGNOSIS — I95.9 HYPOTENSION: ICD-10-CM

## 2023-01-01 DIAGNOSIS — G57.70: ICD-10-CM

## 2023-01-01 DIAGNOSIS — J98.4 MIXED RESTRICTIVE AND OBSTRUCTIVE LUNG DISEASE: Chronic | ICD-10-CM

## 2023-01-01 DIAGNOSIS — M51.36 DDD (DEGENERATIVE DISC DISEASE), LUMBAR: ICD-10-CM

## 2023-01-01 DIAGNOSIS — I50.9 CHF (CONGESTIVE HEART FAILURE): ICD-10-CM

## 2023-01-01 DIAGNOSIS — I48.0 PAROXYSMAL ATRIAL FIBRILLATION: Primary | ICD-10-CM

## 2023-01-01 DIAGNOSIS — E11.9 TYPE 2 DIABETES MELLITUS WITHOUT COMPLICATION, WITHOUT LONG-TERM CURRENT USE OF INSULIN: ICD-10-CM

## 2023-01-01 DIAGNOSIS — I70.0 ATHEROSCLEROSIS OF AORTA: ICD-10-CM

## 2023-01-01 DIAGNOSIS — D50.0 IRON DEFICIENCY ANEMIA DUE TO CHRONIC BLOOD LOSS: ICD-10-CM

## 2023-01-01 DIAGNOSIS — I47.29 NSVT (NONSUSTAINED VENTRICULAR TACHYCARDIA): ICD-10-CM

## 2023-01-01 DIAGNOSIS — Z95.0 CARDIAC PACEMAKER IN SITU: ICD-10-CM

## 2023-01-01 DIAGNOSIS — I25.118 CORONARY ARTERY DISEASE OF NATIVE ARTERY OF NATIVE HEART WITH STABLE ANGINA PECTORIS: ICD-10-CM

## 2023-01-01 DIAGNOSIS — D53.9 NUTRITIONAL ANEMIA, UNSPECIFIED: ICD-10-CM

## 2023-01-01 DIAGNOSIS — D64.9 ANEMIA, UNSPECIFIED TYPE: ICD-10-CM

## 2023-01-01 DIAGNOSIS — R54 AGE-RELATED PHYSICAL DEBILITY: Primary | ICD-10-CM

## 2023-01-01 DIAGNOSIS — N18.4 STAGE 4 CHRONIC KIDNEY DISEASE: ICD-10-CM

## 2023-01-01 DIAGNOSIS — G47.33 OSA (OBSTRUCTIVE SLEEP APNEA): ICD-10-CM

## 2023-01-01 DIAGNOSIS — G57.70 CAUSALGIA OF LOWER EXTREMITY, UNSPECIFIED LATERALITY: ICD-10-CM

## 2023-01-01 DIAGNOSIS — I50.32 CHRONIC DIASTOLIC CONGESTIVE HEART FAILURE: ICD-10-CM

## 2023-01-01 DIAGNOSIS — R06.00 DYSPNEA: ICD-10-CM

## 2023-01-01 DIAGNOSIS — N18.4 ANEMIA ASSOCIATED WITH STAGE 4 CHRONIC RENAL FAILURE: ICD-10-CM

## 2023-01-01 DIAGNOSIS — R31.9 URINARY TRACT INFECTION WITH HEMATURIA, SITE UNSPECIFIED: Primary | ICD-10-CM

## 2023-01-01 DIAGNOSIS — M96.1 POSTLAMINECTOMY SYNDROME: Primary | ICD-10-CM

## 2023-01-01 DIAGNOSIS — Z95.0 PRESENCE OF CARDIAC PACEMAKER: ICD-10-CM

## 2023-01-01 DIAGNOSIS — M25.562 ACUTE PAIN OF LEFT KNEE: ICD-10-CM

## 2023-01-01 DIAGNOSIS — R53.1 WEAKNESS: ICD-10-CM

## 2023-01-01 DIAGNOSIS — Z85.46 HISTORY OF PROSTATE CANCER: ICD-10-CM

## 2023-01-01 DIAGNOSIS — J98.4 MIXED RESTRICTIVE AND OBSTRUCTIVE LUNG DISEASE: ICD-10-CM

## 2023-01-01 DIAGNOSIS — N18.30 DIABETES MELLITUS WITH STAGE 3 CHRONIC KIDNEY DISEASE: ICD-10-CM

## 2023-01-01 DIAGNOSIS — M96.1 FAILED BACK SURGICAL SYNDROME: ICD-10-CM

## 2023-01-01 DIAGNOSIS — M25.562 LEFT KNEE PAIN: ICD-10-CM

## 2023-01-01 DIAGNOSIS — R79.89 ELEVATED LFTS: Primary | ICD-10-CM

## 2023-01-01 DIAGNOSIS — I50.43 ACUTE ON CHRONIC COMBINED SYSTOLIC AND DIASTOLIC CONGESTIVE HEART FAILURE: ICD-10-CM

## 2023-01-01 DIAGNOSIS — M96.1 POSTLAMINECTOMY SYNDROME: ICD-10-CM

## 2023-01-01 DIAGNOSIS — N17.9 ACUTE RENAL FAILURE SUPERIMPOSED ON CHRONIC KIDNEY DISEASE, UNSPECIFIED CKD STAGE, UNSPECIFIED ACUTE RENAL FAILURE TYPE: Primary | ICD-10-CM

## 2023-01-01 DIAGNOSIS — N39.0 URINARY TRACT INFECTION WITH HEMATURIA, SITE UNSPECIFIED: Primary | ICD-10-CM

## 2023-01-01 DIAGNOSIS — N17.9 AKI (ACUTE KIDNEY INJURY): ICD-10-CM

## 2023-01-01 DIAGNOSIS — Z76.89 ENCOUNTER TO ESTABLISH CARE: Primary | ICD-10-CM

## 2023-01-01 DIAGNOSIS — I10 PRIMARY HYPERTENSION: ICD-10-CM

## 2023-01-01 DIAGNOSIS — D71 GRANULOMATOUS DISEASE: ICD-10-CM

## 2023-01-01 DIAGNOSIS — Z00.00 ENCOUNTER FOR PREVENTIVE HEALTH EXAMINATION: Primary | ICD-10-CM

## 2023-01-01 DIAGNOSIS — K51.40 PSEUDOPOLYPOSIS OF COLON WITHOUT COMPLICATION, UNSPECIFIED PART OF COLON: ICD-10-CM

## 2023-01-01 DIAGNOSIS — R19.7 DIARRHEA, UNSPECIFIED TYPE: ICD-10-CM

## 2023-01-01 DIAGNOSIS — D63.1 ANEMIA IN STAGE 4 CHRONIC KIDNEY DISEASE: ICD-10-CM

## 2023-01-01 DIAGNOSIS — E87.6 HYPOKALEMIA: ICD-10-CM

## 2023-01-01 DIAGNOSIS — I35.0 AORTIC VALVE STENOSIS, ETIOLOGY OF CARDIAC VALVE DISEASE UNSPECIFIED: ICD-10-CM

## 2023-01-01 DIAGNOSIS — M54.16 LUMBAR RADICULOPATHY: ICD-10-CM

## 2023-01-01 DIAGNOSIS — G57.70 CAUSALGIA OF LOWER EXTREMITY, UNSPECIFIED LATERALITY: Primary | ICD-10-CM

## 2023-01-01 DIAGNOSIS — R10.84 GENERALIZED ABDOMINAL PAIN: ICD-10-CM

## 2023-01-01 DIAGNOSIS — M25.562 CHRONIC PAIN OF LEFT KNEE: ICD-10-CM

## 2023-01-01 DIAGNOSIS — E78.5 HYPERLIPIDEMIA ASSOCIATED WITH TYPE 2 DIABETES MELLITUS: ICD-10-CM

## 2023-01-01 DIAGNOSIS — J43.9 MIXED RESTRICTIVE AND OBSTRUCTIVE LUNG DISEASE: Chronic | ICD-10-CM

## 2023-01-01 DIAGNOSIS — E11.9 TYPE 2 DIABETES MELLITUS WITHOUT COMPLICATION, WITHOUT LONG-TERM CURRENT USE OF INSULIN: Primary | ICD-10-CM

## 2023-01-01 DIAGNOSIS — R23.8 CHANGE OF SKIN COLOR: Primary | ICD-10-CM

## 2023-01-01 DIAGNOSIS — N18.4 ANEMIA IN STAGE 4 CHRONIC KIDNEY DISEASE: Primary | ICD-10-CM

## 2023-01-01 DIAGNOSIS — I50.40 COMBINED SYSTOLIC AND DIASTOLIC CONGESTIVE HEART FAILURE, UNSPECIFIED HF CHRONICITY: ICD-10-CM

## 2023-01-01 DIAGNOSIS — J43.9 MIXED RESTRICTIVE AND OBSTRUCTIVE LUNG DISEASE: ICD-10-CM

## 2023-01-01 DIAGNOSIS — N18.4 STAGE 4 CHRONIC KIDNEY DISEASE: Primary | ICD-10-CM

## 2023-01-01 DIAGNOSIS — L40.9 PSORIASIS: ICD-10-CM

## 2023-01-01 DIAGNOSIS — G89.29 CHRONIC PAIN OF LEFT KNEE: ICD-10-CM

## 2023-01-01 DIAGNOSIS — M77.12 LATERAL EPICONDYLITIS OF LEFT ELBOW: Primary | ICD-10-CM

## 2023-01-01 DIAGNOSIS — I48.91 ATRIAL FIBRILLATION, UNSPECIFIED TYPE: ICD-10-CM

## 2023-01-01 DIAGNOSIS — N18.9 ACUTE RENAL FAILURE SUPERIMPOSED ON CHRONIC KIDNEY DISEASE, UNSPECIFIED CKD STAGE, UNSPECIFIED ACUTE RENAL FAILURE TYPE: Primary | ICD-10-CM

## 2023-01-01 DIAGNOSIS — E78.2 MIXED HYPERLIPIDEMIA: ICD-10-CM
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
## 2023-01-01 DIAGNOSIS — R60.0 LOCALIZED EDEMA: ICD-10-CM

## 2023-01-01 DIAGNOSIS — I50.43 ACUTE ON CHRONIC COMBINED SYSTOLIC AND DIASTOLIC CONGESTIVE HEART FAILURE: Primary | ICD-10-CM

## 2023-01-01 LAB
ABO + RH BLD: NORMAL
ALBUMIN SERPL BCP-MCNC: 2.2 G/DL (ref 3.5–5.2)
ALBUMIN SERPL BCP-MCNC: 2.8 G/DL (ref 3.5–5.2)
ALBUMIN SERPL BCP-MCNC: 2.9 G/DL (ref 3.5–5.2)
ALBUMIN SERPL BCP-MCNC: 2.9 G/DL (ref 3.5–5.2)
ALBUMIN SERPL BCP-MCNC: 3 G/DL (ref 3.5–5.2)
ALBUMIN SERPL BCP-MCNC: 3.2 G/DL (ref 3.5–5.2)
ALBUMIN SERPL BCP-MCNC: 3.4 G/DL (ref 3.5–5.2)
ALBUMIN SERPL BCP-MCNC: 3.4 G/DL (ref 3.5–5.2)
ALBUMIN SERPL BCP-MCNC: 4.2 G/DL (ref 3.5–5.2)
ALBUMIN SERPL BCP-MCNC: 4.2 G/DL (ref 3.5–5.2)
ALBUMIN SERPL ELPH-MCNC: 4.29 G/DL (ref 3.35–5.55)
ALP SERPL-CCNC: 101 U/L (ref 55–135)
ALP SERPL-CCNC: 104 U/L (ref 55–135)
ALP SERPL-CCNC: 63 U/L (ref 55–135)
ALP SERPL-CCNC: 63 U/L (ref 55–135)
ALP SERPL-CCNC: 65 U/L (ref 55–135)
ALP SERPL-CCNC: 69 U/L (ref 55–135)
ALP SERPL-CCNC: 74 U/L (ref 55–135)
ALP SERPL-CCNC: 74 U/L (ref 55–135)
ALP SERPL-CCNC: 76 U/L (ref 55–135)
ALP SERPL-CCNC: 78 U/L (ref 55–135)
ALPHA1 GLOB SERPL ELPH-MCNC: 0.31 G/DL (ref 0.17–0.41)
ALPHA2 GLOB SERPL ELPH-MCNC: 0.89 G/DL (ref 0.43–0.99)
ALT SERPL W/O P-5'-P-CCNC: 10 U/L (ref 10–44)
ALT SERPL W/O P-5'-P-CCNC: 12 U/L (ref 10–44)
ALT SERPL W/O P-5'-P-CCNC: 19 U/L (ref 10–44)
ALT SERPL W/O P-5'-P-CCNC: 20 U/L (ref 10–44)
ALT SERPL W/O P-5'-P-CCNC: 22 U/L (ref 10–44)
ALT SERPL W/O P-5'-P-CCNC: 24 U/L (ref 10–44)
ALT SERPL W/O P-5'-P-CCNC: 25 U/L (ref 10–44)
ALT SERPL W/O P-5'-P-CCNC: 26 U/L (ref 10–44)
ALT SERPL W/O P-5'-P-CCNC: 27 U/L (ref 10–44)
ALT SERPL W/O P-5'-P-CCNC: 50 U/L (ref 10–44)
ANION GAP SERPL CALC-SCNC: 10 MMOL/L (ref 8–16)
ANION GAP SERPL CALC-SCNC: 11 MMOL/L (ref 8–16)
ANION GAP SERPL CALC-SCNC: 11 MMOL/L (ref 8–16)
ANION GAP SERPL CALC-SCNC: 13 MMOL/L (ref 8–16)
ANION GAP SERPL CALC-SCNC: 14 MMOL/L (ref 8–16)
ANION GAP SERPL CALC-SCNC: 16 MMOL/L (ref 8–16)
ANION GAP SERPL CALC-SCNC: 16 MMOL/L (ref 8–16)
ANION GAP SERPL CALC-SCNC: 18 MMOL/L (ref 8–16)
ANION GAP SERPL CALC-SCNC: 9 MMOL/L (ref 8–16)
AORTIC ROOT ANNULUS: 3.83 CM
ASCENDING AORTA: 3.17 CM
AST SERPL-CCNC: 22 U/L (ref 10–40)
AST SERPL-CCNC: 25 U/L (ref 10–40)
AST SERPL-CCNC: 28 U/L (ref 10–40)
AST SERPL-CCNC: 31 U/L (ref 10–40)
AST SERPL-CCNC: 32 U/L (ref 10–40)
AST SERPL-CCNC: 33 U/L (ref 10–40)
AST SERPL-CCNC: 34 U/L (ref 10–40)
AST SERPL-CCNC: 34 U/L (ref 10–40)
AST SERPL-CCNC: 35 U/L (ref 10–40)
AST SERPL-CCNC: 41 U/L (ref 10–40)
AV INDEX (PROSTH): 0.57
AV INDEX (PROSTH): 0.59
AV MEAN GRADIENT: 5 MMHG
AV MEAN GRADIENT: 6 MMHG
AV PEAK GRADIENT: 10 MMHG
AV PEAK GRADIENT: 9 MMHG
AV VALVE AREA: 1.83 CM2
AV VALVE AREA: 1.98 CM2
AV VELOCITY RATIO: 0.58
AV VELOCITY RATIO: 0.61
B-GLOBULIN SERPL ELPH-MCNC: 0.73 G/DL (ref 0.5–1.1)
BACTERIA #/AREA URNS HPF: ABNORMAL /HPF
BACTERIA BLD CULT: NORMAL
BACTERIA BLD CULT: NORMAL
BACTERIA UR CULT: ABNORMAL
BASOPHILS # BLD AUTO: 0.01 K/UL (ref 0–0.2)
BASOPHILS # BLD AUTO: 0.01 K/UL (ref 0–0.2)
BASOPHILS # BLD AUTO: 0.02 K/UL (ref 0–0.2)
BASOPHILS # BLD AUTO: 0.03 K/UL (ref 0–0.2)
BASOPHILS # BLD AUTO: 0.04 K/UL (ref 0–0.2)
BASOPHILS # BLD AUTO: 0.04 K/UL (ref 0–0.2)
BASOPHILS # BLD AUTO: 0.05 K/UL (ref 0–0.2)
BASOPHILS # BLD AUTO: 0.06 K/UL (ref 0–0.2)
BASOPHILS NFR BLD: 0.1 % (ref 0–1.9)
BASOPHILS NFR BLD: 0.2 % (ref 0–1.9)
BASOPHILS NFR BLD: 0.3 % (ref 0–1.9)
BASOPHILS NFR BLD: 0.3 % (ref 0–1.9)
BASOPHILS NFR BLD: 0.4 % (ref 0–1.9)
BASOPHILS NFR BLD: 0.5 % (ref 0–1.9)
BASOPHILS NFR BLD: 0.6 % (ref 0–1.9)
BASOPHILS NFR BLD: 0.6 % (ref 0–1.9)
BASOPHILS NFR BLD: 1 % (ref 0–1.9)
BASOPHILS NFR BLD: 1.1 % (ref 0–1.9)
BILIRUB SERPL-MCNC: 0.3 MG/DL (ref 0.1–1)
BILIRUB SERPL-MCNC: 0.3 MG/DL (ref 0.1–1)
BILIRUB SERPL-MCNC: 0.4 MG/DL (ref 0.1–1)
BILIRUB SERPL-MCNC: 0.5 MG/DL (ref 0.1–1)
BILIRUB SERPL-MCNC: 0.5 MG/DL (ref 0.1–1)
BILIRUB SERPL-MCNC: 1 MG/DL (ref 0.1–1)
BILIRUB UR QL STRIP: NEGATIVE
BLD GP AB SCN CELLS X3 SERPL QL: NORMAL
BLD PROD TYP BPU: NORMAL
BLD PROD TYP BPU: NORMAL
BLOOD UNIT EXPIRATION DATE: NORMAL
BLOOD UNIT EXPIRATION DATE: NORMAL
BLOOD UNIT TYPE CODE: 5100
BLOOD UNIT TYPE CODE: 5100
BLOOD UNIT TYPE: NORMAL
BLOOD UNIT TYPE: NORMAL
BNP SERPL-MCNC: 285 PG/ML (ref 0–99)
BNP SERPL-MCNC: 345 PG/ML (ref 0–99)
BNP SERPL-MCNC: 376 PG/ML (ref 0–99)
BNP SERPL-MCNC: 415 PG/ML (ref 0–99)
BNP SERPL-MCNC: 446 PG/ML (ref 0–99)
BSA FOR ECHO PROCEDURE: 2.13 M2
BSA FOR ECHO PROCEDURE: 2.14 M2
BUN SERPL-MCNC: 102 MG/DL (ref 8–23)
BUN SERPL-MCNC: 18 MG/DL (ref 8–23)
BUN SERPL-MCNC: 19 MG/DL (ref 8–23)
BUN SERPL-MCNC: 22 MG/DL (ref 8–23)
BUN SERPL-MCNC: 24 MG/DL (ref 8–23)
BUN SERPL-MCNC: 25 MG/DL (ref 8–23)
BUN SERPL-MCNC: 31 MG/DL (ref 8–23)
BUN SERPL-MCNC: 32 MG/DL (ref 8–23)
BUN SERPL-MCNC: 35 MG/DL (ref 8–23)
BUN SERPL-MCNC: 37 MG/DL (ref 8–23)
BUN SERPL-MCNC: 38 MG/DL (ref 8–23)
BUN SERPL-MCNC: 40 MG/DL (ref 8–23)
BUN SERPL-MCNC: 42 MG/DL (ref 8–23)
BUN SERPL-MCNC: 45 MG/DL (ref 8–23)
BUN SERPL-MCNC: 46 MG/DL (ref 8–23)
BUN SERPL-MCNC: 88 MG/DL (ref 8–23)
BUN SERPL-MCNC: 91 MG/DL (ref 8–23)
BUN SERPL-MCNC: 93 MG/DL (ref 8–23)
BUN SERPL-MCNC: 96 MG/DL (ref 8–23)
BUN SERPL-MCNC: 96 MG/DL (ref 8–23)
BUN SERPL-MCNC: 99 MG/DL (ref 8–23)
CALCIUM SERPL-MCNC: 7.6 MG/DL (ref 8.7–10.5)
CALCIUM SERPL-MCNC: 8.1 MG/DL (ref 8.7–10.5)
CALCIUM SERPL-MCNC: 8.1 MG/DL (ref 8.7–10.5)
CALCIUM SERPL-MCNC: 8.3 MG/DL (ref 8.7–10.5)
CALCIUM SERPL-MCNC: 8.4 MG/DL (ref 8.7–10.5)
CALCIUM SERPL-MCNC: 8.4 MG/DL (ref 8.7–10.5)
CALCIUM SERPL-MCNC: 8.5 MG/DL (ref 8.7–10.5)
CALCIUM SERPL-MCNC: 8.6 MG/DL (ref 8.7–10.5)
CALCIUM SERPL-MCNC: 8.6 MG/DL (ref 8.7–10.5)
CALCIUM SERPL-MCNC: 8.7 MG/DL (ref 8.7–10.5)
CALCIUM SERPL-MCNC: 8.7 MG/DL (ref 8.7–10.5)
CALCIUM SERPL-MCNC: 8.8 MG/DL (ref 8.7–10.5)
CALCIUM SERPL-MCNC: 8.9 MG/DL (ref 8.7–10.5)
CALCIUM SERPL-MCNC: 9 MG/DL (ref 8.7–10.5)
CALCIUM SERPL-MCNC: 9 MG/DL (ref 8.7–10.5)
CALCIUM SERPL-MCNC: 9.1 MG/DL (ref 8.7–10.5)
CALCIUM SERPL-MCNC: 9.1 MG/DL (ref 8.7–10.5)
CALCIUM SERPL-MCNC: 9.4 MG/DL (ref 8.7–10.5)
CALCIUM SERPL-MCNC: 9.8 MG/DL (ref 8.7–10.5)
CHLORIDE SERPL-SCNC: 101 MMOL/L (ref 95–110)
CHLORIDE SERPL-SCNC: 102 MMOL/L (ref 95–110)
CHLORIDE SERPL-SCNC: 104 MMOL/L (ref 95–110)
CHLORIDE SERPL-SCNC: 105 MMOL/L (ref 95–110)
CHLORIDE SERPL-SCNC: 105 MMOL/L (ref 95–110)
CHLORIDE SERPL-SCNC: 106 MMOL/L (ref 95–110)
CHLORIDE SERPL-SCNC: 107 MMOL/L (ref 95–110)
CHLORIDE SERPL-SCNC: 107 MMOL/L (ref 95–110)
CHLORIDE SERPL-SCNC: 108 MMOL/L (ref 95–110)
CHLORIDE SERPL-SCNC: 111 MMOL/L (ref 95–110)
CHLORIDE SERPL-SCNC: 112 MMOL/L (ref 95–110)
CHLORIDE SERPL-SCNC: 113 MMOL/L (ref 95–110)
CHLORIDE SERPL-SCNC: 113 MMOL/L (ref 95–110)
CHLORIDE SERPL-SCNC: 114 MMOL/L (ref 95–110)
CHLORIDE SERPL-SCNC: 115 MMOL/L (ref 95–110)
CHLORIDE SERPL-SCNC: 116 MMOL/L (ref 95–110)
CHLORIDE SERPL-SCNC: 116 MMOL/L (ref 95–110)
CK SERPL-CCNC: 269 U/L (ref 20–200)
CLARITY UR: ABNORMAL
CLARITY UR: CLEAR
CO2 SERPL-SCNC: 17 MMOL/L (ref 23–29)
CO2 SERPL-SCNC: 18 MMOL/L (ref 23–29)
CO2 SERPL-SCNC: 19 MMOL/L (ref 23–29)
CO2 SERPL-SCNC: 20 MMOL/L (ref 23–29)
CO2 SERPL-SCNC: 21 MMOL/L (ref 23–29)
CO2 SERPL-SCNC: 22 MMOL/L (ref 23–29)
CO2 SERPL-SCNC: 22 MMOL/L (ref 23–29)
CO2 SERPL-SCNC: 23 MMOL/L (ref 23–29)
CO2 SERPL-SCNC: 23 MMOL/L (ref 23–29)
CO2 SERPL-SCNC: 24 MMOL/L (ref 23–29)
CO2 SERPL-SCNC: 25 MMOL/L (ref 23–29)
CODING SYSTEM: NORMAL
CODING SYSTEM: NORMAL
COLOR UR: YELLOW
CREAT SERPL-MCNC: 1.3 MG/DL (ref 0.5–1.4)
CREAT SERPL-MCNC: 1.4 MG/DL (ref 0.5–1.4)
CREAT SERPL-MCNC: 1.4 MG/DL (ref 0.5–1.4)
CREAT SERPL-MCNC: 1.5 MG/DL (ref 0.5–1.4)
CREAT SERPL-MCNC: 1.6 MG/DL (ref 0.5–1.4)
CREAT SERPL-MCNC: 1.6 MG/DL (ref 0.5–1.4)
CREAT SERPL-MCNC: 1.7 MG/DL (ref 0.5–1.4)
CREAT SERPL-MCNC: 1.8 MG/DL (ref 0.5–1.4)
CREAT SERPL-MCNC: 1.9 MG/DL (ref 0.5–1.4)
CREAT SERPL-MCNC: 2.1 MG/DL (ref 0.5–1.4)
CREAT SERPL-MCNC: 2.2 MG/DL (ref 0.5–1.4)
CREAT SERPL-MCNC: 2.4 MG/DL (ref 0.5–1.4)
CREAT SERPL-MCNC: 2.5 MG/DL (ref 0.5–1.4)
CREAT SERPL-MCNC: 2.6 MG/DL (ref 0.5–1.4)
CREAT SERPL-MCNC: 2.7 MG/DL (ref 0.5–1.4)
CREAT SERPL-MCNC: 2.9 MG/DL (ref 0.5–1.4)
CREAT SERPL-MCNC: 3.5 MG/DL (ref 0.5–1.4)
CREAT SERPL-MCNC: 3.5 MG/DL (ref 0.5–1.4)
CREAT UR-MCNC: 85.6 MG/DL (ref 23–375)
CROSSMATCH INTERPRETATION: NORMAL
CROSSMATCH INTERPRETATION: NORMAL
CRP SERPL-MCNC: 178.3 MG/L (ref 0–8.2)
CV ECHO LV RWT: 0.26 CM
CV ECHO LV RWT: 0.59 CM
DIFFERENTIAL METHOD: ABNORMAL
DISPENSE STATUS: NORMAL
DISPENSE STATUS: NORMAL
DOP CALC AO PEAK VEL: 1.53 M/S
DOP CALC AO PEAK VEL: 1.57 M/S
DOP CALC AO VTI: 29.3 CM
DOP CALC AO VTI: 31.1 CM
DOP CALC LVOT AREA: 3.2 CM2
DOP CALC LVOT AREA: 3.3 CM2
DOP CALC LVOT DIAMETER: 2.02 CM
DOP CALC LVOT DIAMETER: 2.06 CM
DOP CALC LVOT PEAK VEL: 0.91 M/S
DOP CALC LVOT PEAK VEL: 0.93 M/S
DOP CALC LVOT STROKE VOLUME: 53.49 CM3
DOP CALC LVOT STROKE VOLUME: 61.63 CM3
DOP CALC RVOT PEAK VEL: 0.77 M/S
DOP CALC RVOT VTI: 14.5 CM
DOP CALCLVOT PEAK VEL VTI: 16.7 CM
DOP CALCLVOT PEAK VEL VTI: 18.5 CM
E WAVE DECELERATION TIME: 276.19 MSEC
E/A RATIO: 3.6
E/E' RATIO: 12.67 M/S
ECHO LV POSTERIOR WALL: 0.85 CM (ref 0.6–1.1)
ECHO LV POSTERIOR WALL: 1.58 CM (ref 0.6–1.1)
EJECTION FRACTION: 50 %
EJECTION FRACTION: 50 %
EOSINOPHIL # BLD AUTO: 0 K/UL (ref 0–0.5)
EOSINOPHIL # BLD AUTO: 0.1 K/UL (ref 0–0.5)
EOSINOPHIL # BLD AUTO: 0.3 K/UL (ref 0–0.5)
EOSINOPHIL NFR BLD: 0 % (ref 0–8)
EOSINOPHIL NFR BLD: 0.1 % (ref 0–8)
EOSINOPHIL NFR BLD: 0.1 % (ref 0–8)
EOSINOPHIL NFR BLD: 0.2 % (ref 0–8)
EOSINOPHIL NFR BLD: 0.5 % (ref 0–8)
EOSINOPHIL NFR BLD: 0.7 % (ref 0–8)
EOSINOPHIL NFR BLD: 0.7 % (ref 0–8)
EOSINOPHIL NFR BLD: 0.9 % (ref 0–8)
EOSINOPHIL NFR BLD: 0.9 % (ref 0–8)
EOSINOPHIL NFR BLD: 1.1 % (ref 0–8)
EOSINOPHIL NFR BLD: 1.1 % (ref 0–8)
EOSINOPHIL NFR BLD: 1.3 % (ref 0–8)
EOSINOPHIL NFR BLD: 1.4 % (ref 0–8)
EOSINOPHIL NFR BLD: 1.4 % (ref 0–8)
EOSINOPHIL NFR BLD: 1.8 % (ref 0–8)
EOSINOPHIL NFR BLD: 1.9 % (ref 0–8)
EOSINOPHIL NFR BLD: 1.9 % (ref 0–8)
EOSINOPHIL NFR BLD: 2 % (ref 0–8)
EOSINOPHIL NFR BLD: 2.4 % (ref 0–8)
EOSINOPHIL NFR BLD: 5.2 % (ref 0–8)
ERYTHROCYTE [DISTWIDTH] IN BLOOD BY AUTOMATED COUNT: 12.4 % (ref 11.5–14.5)
ERYTHROCYTE [DISTWIDTH] IN BLOOD BY AUTOMATED COUNT: 14.1 % (ref 11.5–14.5)
ERYTHROCYTE [DISTWIDTH] IN BLOOD BY AUTOMATED COUNT: 14.6 % (ref 11.5–14.5)
ERYTHROCYTE [DISTWIDTH] IN BLOOD BY AUTOMATED COUNT: 14.7 % (ref 11.5–14.5)
ERYTHROCYTE [DISTWIDTH] IN BLOOD BY AUTOMATED COUNT: 15.4 % (ref 11.5–14.5)
ERYTHROCYTE [DISTWIDTH] IN BLOOD BY AUTOMATED COUNT: 15.8 % (ref 11.5–14.5)
ERYTHROCYTE [DISTWIDTH] IN BLOOD BY AUTOMATED COUNT: 17 % (ref 11.5–14.5)
ERYTHROCYTE [DISTWIDTH] IN BLOOD BY AUTOMATED COUNT: 17.1 % (ref 11.5–14.5)
ERYTHROCYTE [DISTWIDTH] IN BLOOD BY AUTOMATED COUNT: 17.2 % (ref 11.5–14.5)
ERYTHROCYTE [DISTWIDTH] IN BLOOD BY AUTOMATED COUNT: 17.2 % (ref 11.5–14.5)
ERYTHROCYTE [DISTWIDTH] IN BLOOD BY AUTOMATED COUNT: 17.4 % (ref 11.5–14.5)
ERYTHROCYTE [DISTWIDTH] IN BLOOD BY AUTOMATED COUNT: 17.7 % (ref 11.5–14.5)
ERYTHROCYTE [DISTWIDTH] IN BLOOD BY AUTOMATED COUNT: 17.8 % (ref 11.5–14.5)
ERYTHROCYTE [DISTWIDTH] IN BLOOD BY AUTOMATED COUNT: 17.9 % (ref 11.5–14.5)
ERYTHROCYTE [DISTWIDTH] IN BLOOD BY AUTOMATED COUNT: 17.9 % (ref 11.5–14.5)
ERYTHROCYTE [DISTWIDTH] IN BLOOD BY AUTOMATED COUNT: 18.2 % (ref 11.5–14.5)
ERYTHROCYTE [DISTWIDTH] IN BLOOD BY AUTOMATED COUNT: 18.4 % (ref 11.5–14.5)
ERYTHROCYTE [DISTWIDTH] IN BLOOD BY AUTOMATED COUNT: 18.6 % (ref 11.5–14.5)
ERYTHROCYTE [SEDIMENTATION RATE] IN BLOOD BY WESTERGREN METHOD: 150 MM/HR (ref 0–10)
EST. GFR  (NO RACE VARIABLE): 17 ML/MIN/1.73 M^2
EST. GFR  (NO RACE VARIABLE): 17 ML/MIN/1.73 M^2
EST. GFR  (NO RACE VARIABLE): 21 ML/MIN/1.73 M^2
EST. GFR  (NO RACE VARIABLE): 23 ML/MIN/1.73 M^2
EST. GFR  (NO RACE VARIABLE): 24 ML/MIN/1.73 M^2
EST. GFR  (NO RACE VARIABLE): 25 ML/MIN/1.73 M^2
EST. GFR  (NO RACE VARIABLE): 27 ML/MIN/1.73 M^2
EST. GFR  (NO RACE VARIABLE): 30 ML/MIN/1.73 M^2
EST. GFR  (NO RACE VARIABLE): 31 ML/MIN/1.73 M^2
EST. GFR  (NO RACE VARIABLE): 35 ML/MIN/1.73 M^2
EST. GFR  (NO RACE VARIABLE): 37 ML/MIN/1.73 M^2
EST. GFR  (NO RACE VARIABLE): 40 ML/MIN/1.73 M^2
EST. GFR  (NO RACE VARIABLE): 40.2 ML/MIN/1.73 M^2
EST. GFR  (NO RACE VARIABLE): 43 ML/MIN/1.73 M^2
EST. GFR  (NO RACE VARIABLE): 43 ML/MIN/1.73 M^2
EST. GFR  (NO RACE VARIABLE): 47 ML/MIN/1.73 M^2
EST. GFR  (NO RACE VARIABLE): 50 ML/MIN/1.73 M^2
EST. GFR  (NO RACE VARIABLE): 51 ML/MIN/1.73 M^2
EST. GFR  (NO RACE VARIABLE): 55 ML/MIN/1.73 M^2
FERRITIN SERPL-MCNC: 111 NG/ML (ref 20–300)
FERRITIN SERPL-MCNC: 69 NG/ML (ref 20–300)
FOLATE SERPL-MCNC: 39 NG/ML (ref 4–24)
FRACTIONAL SHORTENING: 29 % (ref 28–44)
FRACTIONAL SHORTENING: 29 % (ref 28–44)
GAMMA GLOB SERPL ELPH-MCNC: 0.97 G/DL (ref 0.67–1.58)
GLUCOSE SERPL-MCNC: 109 MG/DL (ref 70–110)
GLUCOSE SERPL-MCNC: 110 MG/DL (ref 70–110)
GLUCOSE SERPL-MCNC: 111 MG/DL (ref 70–110)
GLUCOSE SERPL-MCNC: 112 MG/DL (ref 70–110)
GLUCOSE SERPL-MCNC: 113 MG/DL (ref 70–110)
GLUCOSE SERPL-MCNC: 115 MG/DL (ref 70–110)
GLUCOSE SERPL-MCNC: 123 MG/DL (ref 70–110)
GLUCOSE SERPL-MCNC: 123 MG/DL (ref 70–110)
GLUCOSE SERPL-MCNC: 124 MG/DL (ref 70–110)
GLUCOSE SERPL-MCNC: 130 MG/DL (ref 70–110)
GLUCOSE SERPL-MCNC: 130 MG/DL (ref 70–110)
GLUCOSE SERPL-MCNC: 132 MG/DL (ref 70–110)
GLUCOSE SERPL-MCNC: 132 MG/DL (ref 70–110)
GLUCOSE SERPL-MCNC: 133 MG/DL (ref 70–110)
GLUCOSE SERPL-MCNC: 133 MG/DL (ref 70–110)
GLUCOSE SERPL-MCNC: 134 MG/DL (ref 70–110)
GLUCOSE SERPL-MCNC: 138 MG/DL (ref 70–110)
GLUCOSE SERPL-MCNC: 140 MG/DL (ref 70–110)
GLUCOSE SERPL-MCNC: 141 MG/DL (ref 70–110)
GLUCOSE SERPL-MCNC: 143 MG/DL (ref 70–110)
GLUCOSE SERPL-MCNC: 148 MG/DL (ref 70–110)
GLUCOSE SERPL-MCNC: 151 MG/DL (ref 70–110)
GLUCOSE SERPL-MCNC: 165 MG/DL (ref 70–110)
GLUCOSE UR QL STRIP: NEGATIVE
HCT VFR BLD AUTO: 23.3 % (ref 40–54)
HCT VFR BLD AUTO: 25.3 % (ref 40–54)
HCT VFR BLD AUTO: 25.4 % (ref 40–54)
HCT VFR BLD AUTO: 26 % (ref 40–54)
HCT VFR BLD AUTO: 26.7 % (ref 40–54)
HCT VFR BLD AUTO: 27.1 % (ref 40–54)
HCT VFR BLD AUTO: 28.1 % (ref 40–54)
HCT VFR BLD AUTO: 28.7 % (ref 40–54)
HCT VFR BLD AUTO: 28.7 % (ref 40–54)
HCT VFR BLD AUTO: 28.8 % (ref 40–54)
HCT VFR BLD AUTO: 29.2 % (ref 40–54)
HCT VFR BLD AUTO: 29.3 % (ref 40–54)
HCT VFR BLD AUTO: 29.4 % (ref 40–54)
HCT VFR BLD AUTO: 29.6 % (ref 40–54)
HCT VFR BLD AUTO: 29.9 % (ref 40–54)
HCT VFR BLD AUTO: 30 % (ref 40–54)
HCT VFR BLD AUTO: 30.4 % (ref 40–54)
HCT VFR BLD AUTO: 31.4 % (ref 40–54)
HCT VFR BLD AUTO: 33.2 % (ref 40–54)
HCT VFR BLD AUTO: 35.8 % (ref 40–54)
HCYS SERPL-SCNC: 18.3 UMOL/L (ref 4–16.5)
HGB BLD-MCNC: 10.4 G/DL (ref 14–18)
HGB BLD-MCNC: 11 G/DL (ref 14–18)
HGB BLD-MCNC: 7.2 G/DL (ref 14–18)
HGB BLD-MCNC: 7.8 G/DL (ref 14–18)
HGB BLD-MCNC: 7.8 G/DL (ref 14–18)
HGB BLD-MCNC: 8 G/DL (ref 14–18)
HGB BLD-MCNC: 8.3 G/DL (ref 14–18)
HGB BLD-MCNC: 8.4 G/DL (ref 14–18)
HGB BLD-MCNC: 8.4 G/DL (ref 14–18)
HGB BLD-MCNC: 8.6 G/DL (ref 14–18)
HGB BLD-MCNC: 8.7 G/DL (ref 14–18)
HGB BLD-MCNC: 8.8 G/DL (ref 14–18)
HGB BLD-MCNC: 8.8 G/DL (ref 14–18)
HGB BLD-MCNC: 9 G/DL (ref 14–18)
HGB BLD-MCNC: 9.3 G/DL (ref 14–18)
HGB BLD-MCNC: 9.4 G/DL (ref 14–18)
HGB BLD-MCNC: 9.4 G/DL (ref 14–18)
HGB BLD-MCNC: 9.7 G/DL (ref 14–18)
HGB UR QL STRIP: ABNORMAL
HGB UR QL STRIP: ABNORMAL
HGB UR QL STRIP: NEGATIVE
HGB UR QL STRIP: NEGATIVE
HYALINE CASTS #/AREA URNS LPF: 19 /LPF
HYALINE CASTS #/AREA URNS LPF: 4 /LPF
HYALINE CASTS #/AREA URNS LPF: 5 /LPF
IMM GRANULOCYTES # BLD AUTO: 0.01 K/UL (ref 0–0.04)
IMM GRANULOCYTES # BLD AUTO: 0.02 K/UL (ref 0–0.04)
IMM GRANULOCYTES # BLD AUTO: 0.03 K/UL (ref 0–0.04)
IMM GRANULOCYTES # BLD AUTO: 0.05 K/UL (ref 0–0.04)
IMM GRANULOCYTES # BLD AUTO: 0.06 K/UL (ref 0–0.04)
IMM GRANULOCYTES # BLD AUTO: 0.06 K/UL (ref 0–0.04)
IMM GRANULOCYTES # BLD AUTO: 0.07 K/UL (ref 0–0.04)
IMM GRANULOCYTES # BLD AUTO: 0.08 K/UL (ref 0–0.04)
IMM GRANULOCYTES # BLD AUTO: 0.1 K/UL (ref 0–0.04)
IMM GRANULOCYTES # BLD AUTO: 0.12 K/UL (ref 0–0.04)
IMM GRANULOCYTES # BLD AUTO: 0.13 K/UL (ref 0–0.04)
IMM GRANULOCYTES # BLD AUTO: 0.14 K/UL (ref 0–0.04)
IMM GRANULOCYTES NFR BLD AUTO: 0.2 % (ref 0–0.5)
IMM GRANULOCYTES NFR BLD AUTO: 0.3 % (ref 0–0.5)
IMM GRANULOCYTES NFR BLD AUTO: 0.4 % (ref 0–0.5)
IMM GRANULOCYTES NFR BLD AUTO: 0.5 % (ref 0–0.5)
IMM GRANULOCYTES NFR BLD AUTO: 0.5 % (ref 0–0.5)
IMM GRANULOCYTES NFR BLD AUTO: 0.6 % (ref 0–0.5)
IMM GRANULOCYTES NFR BLD AUTO: 0.6 % (ref 0–0.5)
IMM GRANULOCYTES NFR BLD AUTO: 0.7 % (ref 0–0.5)
IMM GRANULOCYTES NFR BLD AUTO: 0.8 % (ref 0–0.5)
IMM GRANULOCYTES NFR BLD AUTO: 0.9 % (ref 0–0.5)
IMM GRANULOCYTES NFR BLD AUTO: 1 % (ref 0–0.5)
IMM GRANULOCYTES NFR BLD AUTO: 1.4 % (ref 0–0.5)
IMM GRANULOCYTES NFR BLD AUTO: 1.5 % (ref 0–0.5)
INR PPP: 2.1 (ref 0.8–1.2)
INTERPRETATION SERPL IFE-IMP: NORMAL
INTERVENTRICULAR SEPTUM: 0.96 CM (ref 0.6–1.1)
INTERVENTRICULAR SEPTUM: 1.23 CM (ref 0.6–1.1)
IRON SERPL-MCNC: 102 UG/DL (ref 45–160)
IRON SERPL-MCNC: 11 UG/DL (ref 45–160)
IVC DIAMETER: 2.51 CM
IVC DIAMETER: 2.87 CM
IVRT: 42.82 MSEC
IVRT: 51.38 MSEC
KAPPA LC SER QL IA: 4.72 MG/DL (ref 0.33–1.94)
KAPPA LC/LAMBDA SER IA: 1.21 (ref 0.26–1.65)
KETONES UR QL STRIP: NEGATIVE
LA MAJOR: 7.07 CM
LA MAJOR: 7.28 CM
LA MINOR: 6.88 CM
LA MINOR: 7 CM
LA WIDTH: 4.2 CM
LA WIDTH: 4.3 CM
LACTATE SERPL-SCNC: 1.6 MMOL/L (ref 0.5–2.2)
LACTATE SERPL-SCNC: 2.2 MMOL/L (ref 0.5–2.2)
LAMBDA LC SER QL IA: 3.9 MG/DL (ref 0.57–2.63)
LEFT ATRIUM SIZE: 4.69 CM
LEFT ATRIUM SIZE: 5.31 CM
LEFT ATRIUM VOLUME INDEX: 58.3 ML/M2
LEFT ATRIUM VOLUME INDEX: 63.3 ML/M2
LEFT ATRIUM VOLUME: 122.35 CM3
LEFT ATRIUM VOLUME: 132.2 CM3
LEFT INTERNAL DIMENSION IN SYSTOLE: 3.76 CM (ref 2.1–4)
LEFT INTERNAL DIMENSION IN SYSTOLE: 4.59 CM (ref 2.1–4)
LEFT VENTRICLE DIASTOLIC VOLUME INDEX: 103.71 ML/M2
LEFT VENTRICLE DIASTOLIC VOLUME INDEX: 65.31 ML/M2
LEFT VENTRICLE DIASTOLIC VOLUME: 137.16 ML
LEFT VENTRICLE DIASTOLIC VOLUME: 216.76 ML
LEFT VENTRICLE MASS INDEX: 120 G/M2
LEFT VENTRICLE MASS INDEX: 154 G/M2
LEFT VENTRICLE SYSTOLIC VOLUME INDEX: 28.7 ML/M2
LEFT VENTRICLE SYSTOLIC VOLUME INDEX: 46.3 ML/M2
LEFT VENTRICLE SYSTOLIC VOLUME: 60.29 ML
LEFT VENTRICLE SYSTOLIC VOLUME: 96.76 ML
LEFT VENTRICULAR INTERNAL DIMENSION IN DIASTOLE: 5.33 CM (ref 3.5–6)
LEFT VENTRICULAR INTERNAL DIMENSION IN DIASTOLE: 6.51 CM (ref 3.5–6)
LEFT VENTRICULAR MASS: 250.23 G
LEFT VENTRICULAR MASS: 323.36 G
LEUKOCYTE ESTERASE UR QL STRIP: ABNORMAL
LEUKOCYTE ESTERASE UR QL STRIP: NEGATIVE
LIPASE SERPL-CCNC: 48 U/L (ref 4–60)
LV LATERAL E/E' RATIO: 11.4 M/S
LV SEPTAL E/E' RATIO: 14.25 M/S
LVOT MG: 1.55 MMHG
LVOT MG: 1.7 MMHG
LVOT MV: 0.56 CM/S
LVOT MV: 0.61 CM/S
LYMPHOCYTES # BLD AUTO: 0.3 K/UL (ref 1–4.8)
LYMPHOCYTES # BLD AUTO: 0.4 K/UL (ref 1–4.8)
LYMPHOCYTES # BLD AUTO: 0.5 K/UL (ref 1–4.8)
LYMPHOCYTES # BLD AUTO: 0.6 K/UL (ref 1–4.8)
LYMPHOCYTES # BLD AUTO: 0.7 K/UL (ref 1–4.8)
LYMPHOCYTES # BLD AUTO: 0.8 K/UL (ref 1–4.8)
LYMPHOCYTES # BLD AUTO: 1.1 K/UL (ref 1–4.8)
LYMPHOCYTES NFR BLD: 1.9 % (ref 18–48)
LYMPHOCYTES NFR BLD: 10.8 % (ref 18–48)
LYMPHOCYTES NFR BLD: 11.4 % (ref 18–48)
LYMPHOCYTES NFR BLD: 14.3 % (ref 18–48)
LYMPHOCYTES NFR BLD: 14.8 % (ref 18–48)
LYMPHOCYTES NFR BLD: 2.2 % (ref 18–48)
LYMPHOCYTES NFR BLD: 2.5 % (ref 18–48)
LYMPHOCYTES NFR BLD: 3.2 % (ref 18–48)
LYMPHOCYTES NFR BLD: 3.3 % (ref 18–48)
LYMPHOCYTES NFR BLD: 3.6 % (ref 18–48)
LYMPHOCYTES NFR BLD: 4.6 % (ref 18–48)
LYMPHOCYTES NFR BLD: 4.6 % (ref 18–48)
LYMPHOCYTES NFR BLD: 5 % (ref 18–48)
LYMPHOCYTES NFR BLD: 5.2 % (ref 18–48)
LYMPHOCYTES NFR BLD: 5.9 % (ref 18–48)
LYMPHOCYTES NFR BLD: 5.9 % (ref 18–48)
LYMPHOCYTES NFR BLD: 6.1 % (ref 18–48)
LYMPHOCYTES NFR BLD: 6.6 % (ref 18–48)
LYMPHOCYTES NFR BLD: 8 % (ref 18–48)
LYMPHOCYTES NFR BLD: 8.6 % (ref 18–48)
MAGNESIUM SERPL-MCNC: 1.7 MG/DL (ref 1.6–2.6)
MAGNESIUM SERPL-MCNC: 1.9 MG/DL (ref 1.6–2.6)
MAGNESIUM SERPL-MCNC: 1.9 MG/DL (ref 1.6–2.6)
MAGNESIUM SERPL-MCNC: 2 MG/DL (ref 1.6–2.6)
MCH RBC QN AUTO: 28.3 PG (ref 27–31)
MCH RBC QN AUTO: 28.4 PG (ref 27–31)
MCH RBC QN AUTO: 28.6 PG (ref 27–31)
MCH RBC QN AUTO: 28.7 PG (ref 27–31)
MCH RBC QN AUTO: 28.7 PG (ref 27–31)
MCH RBC QN AUTO: 28.9 PG (ref 27–31)
MCH RBC QN AUTO: 29 PG (ref 27–31)
MCH RBC QN AUTO: 29.1 PG (ref 27–31)
MCH RBC QN AUTO: 29.2 PG (ref 27–31)
MCH RBC QN AUTO: 29.3 PG (ref 27–31)
MCH RBC QN AUTO: 29.4 PG (ref 27–31)
MCH RBC QN AUTO: 29.6 PG (ref 27–31)
MCH RBC QN AUTO: 30 PG (ref 27–31)
MCH RBC QN AUTO: 30.8 PG (ref 27–31)
MCH RBC QN AUTO: 30.8 PG (ref 27–31)
MCH RBC QN AUTO: 31 PG (ref 27–31)
MCH RBC QN AUTO: 33.8 PG (ref 27–31)
MCH RBC QN AUTO: 33.9 PG (ref 27–31)
MCHC RBC AUTO-ENTMCNC: 29.5 G/DL (ref 32–36)
MCHC RBC AUTO-ENTMCNC: 29.9 G/DL (ref 32–36)
MCHC RBC AUTO-ENTMCNC: 29.9 G/DL (ref 32–36)
MCHC RBC AUTO-ENTMCNC: 30 G/DL (ref 32–36)
MCHC RBC AUTO-ENTMCNC: 30.2 G/DL (ref 32–36)
MCHC RBC AUTO-ENTMCNC: 30.7 G/DL (ref 32–36)
MCHC RBC AUTO-ENTMCNC: 30.8 G/DL (ref 32–36)
MCHC RBC AUTO-ENTMCNC: 30.9 G/DL (ref 32–36)
MCHC RBC AUTO-ENTMCNC: 30.9 G/DL (ref 32–36)
MCHC RBC AUTO-ENTMCNC: 31 G/DL (ref 32–36)
MCHC RBC AUTO-ENTMCNC: 31 G/DL (ref 32–36)
MCHC RBC AUTO-ENTMCNC: 31.3 G/DL (ref 32–36)
MCHC RBC AUTO-ENTMCNC: 31.4 G/DL (ref 32–36)
MCHC RBC AUTO-ENTMCNC: 31.4 G/DL (ref 32–36)
MCHC RBC AUTO-ENTMCNC: 31.5 G/DL (ref 32–36)
MCHC RBC AUTO-ENTMCNC: 31.5 G/DL (ref 32–36)
MCHC RBC AUTO-ENTMCNC: 31.8 G/DL (ref 32–36)
MCHC RBC AUTO-ENTMCNC: 32.4 G/DL (ref 32–36)
MCV RBC AUTO: 100 FL (ref 82–98)
MCV RBC AUTO: 105 FL (ref 82–98)
MCV RBC AUTO: 109 FL (ref 82–98)
MCV RBC AUTO: 90 FL (ref 82–98)
MCV RBC AUTO: 91 FL (ref 82–98)
MCV RBC AUTO: 92 FL (ref 82–98)
MCV RBC AUTO: 92 FL (ref 82–98)
MCV RBC AUTO: 93 FL (ref 82–98)
MCV RBC AUTO: 93 FL (ref 82–98)
MCV RBC AUTO: 94 FL (ref 82–98)
MCV RBC AUTO: 95 FL (ref 82–98)
MCV RBC AUTO: 97 FL (ref 82–98)
MCV RBC AUTO: 98 FL (ref 82–98)
MCV RBC AUTO: 98 FL (ref 82–98)
MICROSCOPIC COMMENT: ABNORMAL
MONOCYTES # BLD AUTO: 0.5 K/UL (ref 0.3–1)
MONOCYTES # BLD AUTO: 0.6 K/UL (ref 0.3–1)
MONOCYTES # BLD AUTO: 0.6 K/UL (ref 0.3–1)
MONOCYTES # BLD AUTO: 0.7 K/UL (ref 0.3–1)
MONOCYTES # BLD AUTO: 0.8 K/UL (ref 0.3–1)
MONOCYTES # BLD AUTO: 0.9 K/UL (ref 0.3–1)
MONOCYTES # BLD AUTO: 1 K/UL (ref 0.3–1)
MONOCYTES # BLD AUTO: 1.3 K/UL (ref 0.3–1)
MONOCYTES # BLD AUTO: 1.5 K/UL (ref 0.3–1)
MONOCYTES NFR BLD: 10.1 % (ref 4–15)
MONOCYTES NFR BLD: 10.2 % (ref 4–15)
MONOCYTES NFR BLD: 10.9 % (ref 4–15)
MONOCYTES NFR BLD: 11 % (ref 4–15)
MONOCYTES NFR BLD: 12.1 % (ref 4–15)
MONOCYTES NFR BLD: 12.3 % (ref 4–15)
MONOCYTES NFR BLD: 13 % (ref 4–15)
MONOCYTES NFR BLD: 13.1 % (ref 4–15)
MONOCYTES NFR BLD: 13.1 % (ref 4–15)
MONOCYTES NFR BLD: 13.2 % (ref 4–15)
MONOCYTES NFR BLD: 14.8 % (ref 4–15)
MONOCYTES NFR BLD: 6.1 % (ref 4–15)
MONOCYTES NFR BLD: 6.6 % (ref 4–15)
MONOCYTES NFR BLD: 6.6 % (ref 4–15)
MONOCYTES NFR BLD: 6.9 % (ref 4–15)
MONOCYTES NFR BLD: 7.4 % (ref 4–15)
MONOCYTES NFR BLD: 7.6 % (ref 4–15)
MONOCYTES NFR BLD: 9.2 % (ref 4–15)
MONOCYTES NFR BLD: 9.5 % (ref 4–15)
MONOCYTES NFR BLD: 9.9 % (ref 4–15)
MV PEAK A VEL: 0.3 M/S
MV PEAK E VEL: 1.08 M/S
MV PEAK E VEL: 1.14 M/S
MV STENOSIS PRESSURE HALF TIME: 80.09 MS
MV VALVE AREA P 1/2 METHOD: 2.75 CM2
NEUTROPHILS # BLD AUTO: 10.6 K/UL (ref 1.8–7.7)
NEUTROPHILS # BLD AUTO: 12 K/UL (ref 1.8–7.7)
NEUTROPHILS # BLD AUTO: 12.7 K/UL (ref 1.8–7.7)
NEUTROPHILS # BLD AUTO: 3.4 K/UL (ref 1.8–7.7)
NEUTROPHILS # BLD AUTO: 3.8 K/UL (ref 1.8–7.7)
NEUTROPHILS # BLD AUTO: 3.8 K/UL (ref 1.8–7.7)
NEUTROPHILS # BLD AUTO: 3.9 K/UL (ref 1.8–7.7)
NEUTROPHILS # BLD AUTO: 5.6 K/UL (ref 1.8–7.7)
NEUTROPHILS # BLD AUTO: 5.7 K/UL (ref 1.8–7.7)
NEUTROPHILS # BLD AUTO: 5.8 K/UL (ref 1.8–7.7)
NEUTROPHILS # BLD AUTO: 6.8 K/UL (ref 1.8–7.7)
NEUTROPHILS # BLD AUTO: 7.1 K/UL (ref 1.8–7.7)
NEUTROPHILS # BLD AUTO: 7.2 K/UL (ref 1.8–7.7)
NEUTROPHILS # BLD AUTO: 7.3 K/UL (ref 1.8–7.7)
NEUTROPHILS # BLD AUTO: 7.7 K/UL (ref 1.8–7.7)
NEUTROPHILS # BLD AUTO: 7.8 K/UL (ref 1.8–7.7)
NEUTROPHILS # BLD AUTO: 8.1 K/UL (ref 1.8–7.7)
NEUTROPHILS # BLD AUTO: 8.3 K/UL (ref 1.8–7.7)
NEUTROPHILS # BLD AUTO: 8.6 K/UL (ref 1.8–7.7)
NEUTROPHILS # BLD AUTO: 9.8 K/UL (ref 1.8–7.7)
NEUTROPHILS NFR BLD: 68.6 % (ref 38–73)
NEUTROPHILS NFR BLD: 68.8 % (ref 38–73)
NEUTROPHILS NFR BLD: 73.2 % (ref 38–73)
NEUTROPHILS NFR BLD: 76.7 % (ref 38–73)
NEUTROPHILS NFR BLD: 77.1 % (ref 38–73)
NEUTROPHILS NFR BLD: 78.4 % (ref 38–73)
NEUTROPHILS NFR BLD: 78.8 % (ref 38–73)
NEUTROPHILS NFR BLD: 79.5 % (ref 38–73)
NEUTROPHILS NFR BLD: 80.7 % (ref 38–73)
NEUTROPHILS NFR BLD: 81.2 % (ref 38–73)
NEUTROPHILS NFR BLD: 81.9 % (ref 38–73)
NEUTROPHILS NFR BLD: 82.4 % (ref 38–73)
NEUTROPHILS NFR BLD: 83.2 % (ref 38–73)
NEUTROPHILS NFR BLD: 83.3 % (ref 38–73)
NEUTROPHILS NFR BLD: 83.7 % (ref 38–73)
NEUTROPHILS NFR BLD: 87.5 % (ref 38–73)
NEUTROPHILS NFR BLD: 87.7 % (ref 38–73)
NEUTROPHILS NFR BLD: 88.8 % (ref 38–73)
NEUTROPHILS NFR BLD: 90 % (ref 38–73)
NEUTROPHILS NFR BLD: 90.4 % (ref 38–73)
NITRITE UR QL STRIP: NEGATIVE
NRBC BLD-RTO: 0 /100 WBC
NRBC BLD-RTO: 1 /100 WBC
NUM UNITS TRANS PACKED RBC: NORMAL
NUM UNITS TRANS PACKED RBC: NORMAL
OB PNL STL: POSITIVE
OVALOCYTES BLD QL SMEAR: ABNORMAL
PATHOLOGIST INTERPRETATION IFE: NORMAL
PATHOLOGIST INTERPRETATION SPE: NORMAL
PH UR STRIP: 5 [PH] (ref 5–8)
PH UR STRIP: 6 [PH] (ref 5–8)
PHOSPHATE SERPL-MCNC: 1.9 MG/DL (ref 2.7–4.5)
PISA MRMAX VEL: 4.78 M/S
PISA MRMAX VEL: 5.25 M/S
PISA TR MAX VEL: 3.08 M/S
PISA TR MAX VEL: 3.23 M/S
PLATELET # BLD AUTO: 170 K/UL (ref 150–450)
PLATELET # BLD AUTO: 177 K/UL (ref 150–450)
PLATELET # BLD AUTO: 183 K/UL (ref 150–450)
PLATELET # BLD AUTO: 190 K/UL (ref 150–450)
PLATELET # BLD AUTO: 202 K/UL (ref 150–450)
PLATELET # BLD AUTO: 216 K/UL (ref 150–450)
PLATELET # BLD AUTO: 222 K/UL (ref 150–450)
PLATELET # BLD AUTO: 233 K/UL (ref 150–450)
PLATELET # BLD AUTO: 239 K/UL (ref 150–450)
PLATELET # BLD AUTO: 252 K/UL (ref 150–450)
PLATELET # BLD AUTO: 259 K/UL (ref 150–450)
PLATELET # BLD AUTO: 283 K/UL (ref 150–450)
PLATELET # BLD AUTO: 283 K/UL (ref 150–450)
PLATELET # BLD AUTO: 286 K/UL (ref 150–450)
PLATELET # BLD AUTO: 295 K/UL (ref 150–450)
PLATELET # BLD AUTO: 304 K/UL (ref 150–450)
PLATELET # BLD AUTO: 307 K/UL (ref 150–450)
PLATELET # BLD AUTO: 309 K/UL (ref 150–450)
PLATELET # BLD AUTO: 350 K/UL (ref 150–450)
PLATELET # BLD AUTO: 352 K/UL (ref 150–450)
PMV BLD AUTO: 10 FL (ref 9.2–12.9)
PMV BLD AUTO: 10.1 FL (ref 9.2–12.9)
PMV BLD AUTO: 10.1 FL (ref 9.2–12.9)
PMV BLD AUTO: 10.2 FL (ref 9.2–12.9)
PMV BLD AUTO: 10.3 FL (ref 9.2–12.9)
PMV BLD AUTO: 10.4 FL (ref 9.2–12.9)
PMV BLD AUTO: 10.5 FL (ref 9.2–12.9)
PMV BLD AUTO: 10.6 FL (ref 9.2–12.9)
PMV BLD AUTO: 11.3 FL (ref 9.2–12.9)
PMV BLD AUTO: 9.6 FL (ref 9.2–12.9)
POCT GLUCOSE: 110 MG/DL (ref 70–110)
POCT GLUCOSE: 114 MG/DL (ref 70–110)
POCT GLUCOSE: 127 MG/DL (ref 70–110)
POCT GLUCOSE: 127 MG/DL (ref 70–110)
POCT GLUCOSE: 134 MG/DL (ref 70–110)
POCT GLUCOSE: 141 MG/DL (ref 70–110)
POCT GLUCOSE: 142 MG/DL (ref 70–110)
POCT GLUCOSE: 144 MG/DL (ref 70–110)
POCT GLUCOSE: 144 MG/DL (ref 70–110)
POCT GLUCOSE: 146 MG/DL (ref 70–110)
POCT GLUCOSE: 168 MG/DL (ref 70–110)
POCT GLUCOSE: 177 MG/DL (ref 70–110)
POCT GLUCOSE: 88 MG/DL (ref 70–110)
POTASSIUM SERPL-SCNC: 3 MMOL/L (ref 3.5–5.1)
POTASSIUM SERPL-SCNC: 3.1 MMOL/L (ref 3.5–5.1)
POTASSIUM SERPL-SCNC: 3.3 MMOL/L (ref 3.5–5.1)
POTASSIUM SERPL-SCNC: 3.4 MMOL/L (ref 3.5–5.1)
POTASSIUM SERPL-SCNC: 3.5 MMOL/L (ref 3.5–5.1)
POTASSIUM SERPL-SCNC: 3.7 MMOL/L (ref 3.5–5.1)
POTASSIUM SERPL-SCNC: 3.8 MMOL/L (ref 3.5–5.1)
POTASSIUM SERPL-SCNC: 3.9 MMOL/L (ref 3.5–5.1)
POTASSIUM SERPL-SCNC: 4 MMOL/L (ref 3.5–5.1)
POTASSIUM SERPL-SCNC: 4.2 MMOL/L (ref 3.5–5.1)
POTASSIUM SERPL-SCNC: 4.2 MMOL/L (ref 3.5–5.1)
POTASSIUM SERPL-SCNC: 4.3 MMOL/L (ref 3.5–5.1)
POTASSIUM SERPL-SCNC: 4.4 MMOL/L (ref 3.5–5.1)
POTASSIUM SERPL-SCNC: 4.4 MMOL/L (ref 3.5–5.1)
POTASSIUM SERPL-SCNC: 4.5 MMOL/L (ref 3.5–5.1)
POTASSIUM SERPL-SCNC: 4.6 MMOL/L (ref 3.5–5.1)
POTASSIUM SERPL-SCNC: 4.6 MMOL/L (ref 3.5–5.1)
POTASSIUM SERPL-SCNC: 4.7 MMOL/L (ref 3.5–5.1)
POTASSIUM SERPL-SCNC: 4.8 MMOL/L (ref 3.5–5.1)
POTASSIUM SERPL-SCNC: 4.8 MMOL/L (ref 3.5–5.1)
POTASSIUM SERPL-SCNC: 4.9 MMOL/L (ref 3.5–5.1)
POTASSIUM SERPL-SCNC: 5 MMOL/L (ref 3.5–5.1)
POTASSIUM SERPL-SCNC: 5.4 MMOL/L (ref 3.5–5.1)
PROT SERPL-MCNC: 6.4 G/DL (ref 6–8.4)
PROT SERPL-MCNC: 6.6 G/DL (ref 6–8.4)
PROT SERPL-MCNC: 6.7 G/DL (ref 6–8.4)
PROT SERPL-MCNC: 6.7 G/DL (ref 6–8.4)
PROT SERPL-MCNC: 7.1 G/DL (ref 6–8.4)
PROT SERPL-MCNC: 7.2 G/DL (ref 6–8.4)
PROT SERPL-MCNC: 7.2 G/DL (ref 6–8.4)
PROT SERPL-MCNC: 7.6 G/DL (ref 6–8.4)
PROT SERPL-MCNC: 7.7 G/DL (ref 6–8.4)
PROT SERPL-MCNC: 7.8 G/DL (ref 6–8.4)
PROT SERPL-MCNC: 7.9 G/DL (ref 6–8.4)
PROT UR QL STRIP: ABNORMAL
PROTHROMBIN TIME: 21.5 SEC (ref 9–12.5)
PV MEAN GRADIENT: 1.03 MMHG
PV PEAK VELOCITY: 1.15 CM/S
RA MAJOR: 7.76 CM
RA MAJOR: 8.2 CM
RA PRESSURE: 8 MMHG
RBC # BLD AUTO: 2.34 M/UL (ref 4.6–6.2)
RBC # BLD AUTO: 2.52 M/UL (ref 4.6–6.2)
RBC # BLD AUTO: 2.6 M/UL (ref 4.6–6.2)
RBC # BLD AUTO: 2.6 M/UL (ref 4.6–6.2)
RBC # BLD AUTO: 2.75 M/UL (ref 4.6–6.2)
RBC # BLD AUTO: 2.84 M/UL (ref 4.6–6.2)
RBC # BLD AUTO: 2.86 M/UL (ref 4.6–6.2)
RBC # BLD AUTO: 2.86 M/UL (ref 4.6–6.2)
RBC # BLD AUTO: 2.89 M/UL (ref 4.6–6.2)
RBC # BLD AUTO: 2.95 M/UL (ref 4.6–6.2)
RBC # BLD AUTO: 2.97 M/UL (ref 4.6–6.2)
RBC # BLD AUTO: 3.02 M/UL (ref 4.6–6.2)
RBC # BLD AUTO: 3.03 M/UL (ref 4.6–6.2)
RBC # BLD AUTO: 3.17 M/UL (ref 4.6–6.2)
RBC # BLD AUTO: 3.18 M/UL (ref 4.6–6.2)
RBC # BLD AUTO: 3.22 M/UL (ref 4.6–6.2)
RBC # BLD AUTO: 3.28 M/UL (ref 4.6–6.2)
RBC # BLD AUTO: 3.29 M/UL (ref 4.6–6.2)
RBC # BLD AUTO: 3.6 M/UL (ref 4.6–6.2)
RBC # BLD AUTO: 3.84 M/UL (ref 4.6–6.2)
RBC #/AREA URNS HPF: 2 /HPF (ref 0–4)
RBC #/AREA URNS HPF: 47 /HPF (ref 0–4)
RBC #/AREA URNS HPF: >100 /HPF (ref 0–4)
RETICS/RBC NFR AUTO: 1.9 % (ref 0.4–2)
RIGHT VENTRICULAR END-DIASTOLIC DIMENSION: 3.53 CM
RIGHT VENTRICULAR END-DIASTOLIC DIMENSION: 4.24 CM
SATURATED IRON: 32 % (ref 20–50)
SATURATED IRON: 4 % (ref 20–50)
SODIUM SERPL-SCNC: 136 MMOL/L (ref 136–145)
SODIUM SERPL-SCNC: 137 MMOL/L (ref 136–145)
SODIUM SERPL-SCNC: 138 MMOL/L (ref 136–145)
SODIUM SERPL-SCNC: 138 MMOL/L (ref 136–145)
SODIUM SERPL-SCNC: 139 MMOL/L (ref 136–145)
SODIUM SERPL-SCNC: 139 MMOL/L (ref 136–145)
SODIUM SERPL-SCNC: 140 MMOL/L (ref 136–145)
SODIUM SERPL-SCNC: 140 MMOL/L (ref 136–145)
SODIUM SERPL-SCNC: 141 MMOL/L (ref 136–145)
SODIUM SERPL-SCNC: 142 MMOL/L (ref 136–145)
SODIUM SERPL-SCNC: 142 MMOL/L (ref 136–145)
SODIUM SERPL-SCNC: 143 MMOL/L (ref 136–145)
SODIUM SERPL-SCNC: 144 MMOL/L (ref 136–145)
SODIUM SERPL-SCNC: 145 MMOL/L (ref 136–145)
SODIUM SERPL-SCNC: 146 MMOL/L (ref 136–145)
SODIUM UR-SCNC: 45 MMOL/L (ref 20–250)
SP GR UR STRIP: 1.01 (ref 1–1.03)
SP GR UR STRIP: 1.01 (ref 1–1.03)
SP GR UR STRIP: 1.02 (ref 1–1.03)
SP GR UR STRIP: 1.02 (ref 1–1.03)
SPECIMEN OUTDATE: NORMAL
STJ: 3.46 CM
TDI LATERAL: 0.1 M/S
TDI SEPTAL: 0.08 M/S
TDI: 0.09 M/S
TOTAL IRON BINDING CAPACITY: 269 UG/DL (ref 250–450)
TOTAL IRON BINDING CAPACITY: 321 UG/DL (ref 250–450)
TOXIC GRANULES BLD QL SMEAR: PRESENT
TR MAX PG: 38 MMHG
TR MAX PG: 42 MMHG
TRANSFERRIN SERPL-MCNC: 182 MG/DL (ref 200–375)
TRANSFERRIN SERPL-MCNC: 217 MG/DL (ref 200–375)
TRICUSPID ANNULAR PLANE SYSTOLIC EXCURSION: 1.9 CM
TROPONIN I SERPL DL<=0.01 NG/ML-MCNC: 0.03 NG/ML (ref 0–0.03)
TROPONIN I SERPL DL<=0.01 NG/ML-MCNC: 0.04 NG/ML (ref 0–0.03)
TROPONIN I SERPL DL<=0.01 NG/ML-MCNC: 0.04 NG/ML (ref 0–0.03)
TROPONIN I SERPL DL<=0.01 NG/ML-MCNC: 0.05 NG/ML (ref 0–0.03)
TROPONIN I SERPL DL<=0.01 NG/ML-MCNC: 0.06 NG/ML (ref 0–0.03)
TROPONIN I SERPL DL<=0.01 NG/ML-MCNC: 0.06 NG/ML (ref 0–0.03)
TROPONIN I SERPL DL<=0.01 NG/ML-MCNC: 0.07 NG/ML (ref 0–0.03)
TROPONIN I SERPL DL<=0.01 NG/ML-MCNC: 0.24 NG/ML (ref 0–0.03)
TROPONIN I SERPL DL<=0.01 NG/ML-MCNC: 0.25 NG/ML (ref 0–0.03)
TSH SERPL DL<=0.005 MIU/L-ACNC: 2.65 UIU/ML (ref 0.4–4)
TV REST PULMONARY ARTERY PRESSURE: 50 MMHG
UNIDENT CRYS URNS QL MICRO: ABNORMAL
URN SPEC COLLECT METH UR: ABNORMAL
UROBILINOGEN UR STRIP-ACNC: NEGATIVE EU/DL
VIT B12 SERPL-MCNC: 1094 PG/ML (ref 210–950)
WBC # BLD AUTO: 10.08 K/UL (ref 3.9–12.7)
WBC # BLD AUTO: 10.29 K/UL (ref 3.9–12.7)
WBC # BLD AUTO: 11.15 K/UL (ref 3.9–12.7)
WBC # BLD AUTO: 11.77 K/UL (ref 3.9–12.7)
WBC # BLD AUTO: 13.51 K/UL (ref 3.9–12.7)
WBC # BLD AUTO: 14.14 K/UL (ref 3.9–12.7)
WBC # BLD AUTO: 4.99 K/UL (ref 3.9–12.7)
WBC # BLD AUTO: 5.03 K/UL (ref 3.9–12.7)
WBC # BLD AUTO: 5.18 K/UL (ref 3.9–12.7)
WBC # BLD AUTO: 5.53 K/UL (ref 3.9–12.7)
WBC # BLD AUTO: 7.07 K/UL (ref 3.9–12.7)
WBC # BLD AUTO: 7.33 K/UL (ref 3.9–12.7)
WBC # BLD AUTO: 7.36 K/UL (ref 3.9–12.7)
WBC # BLD AUTO: 8.14 K/UL (ref 3.9–12.7)
WBC # BLD AUTO: 8.45 K/UL (ref 3.9–12.7)
WBC # BLD AUTO: 8.6 K/UL (ref 3.9–12.7)
WBC # BLD AUTO: 8.86 K/UL (ref 3.9–12.7)
WBC # BLD AUTO: 9.54 K/UL (ref 3.9–12.7)
WBC # BLD AUTO: 9.63 K/UL (ref 3.9–12.7)
WBC # BLD AUTO: 9.81 K/UL (ref 3.9–12.7)
WBC #/AREA URNS HPF: 1 /HPF (ref 0–5)
WBC #/AREA URNS HPF: 2 /HPF (ref 0–5)
WBC #/AREA URNS HPF: 41 /HPF (ref 0–5)
WBC CLUMPS URNS QL MICRO: ABNORMAL
WBC CLUMPS URNS QL MICRO: ABNORMAL

## 2023-01-01 PROCEDURE — 3079F PR MOST RECENT DIASTOLIC BLOOD PRESSURE 80-89 MM HG: ICD-10-PCS | Mod: CPTII,S$GLB,, | Performed by: ANESTHESIOLOGY

## 2023-01-01 PROCEDURE — 63600175 PHARM REV CODE 636 W HCPCS: Performed by: COLON & RECTAL SURGERY

## 2023-01-01 PROCEDURE — 63600175 PHARM REV CODE 636 W HCPCS: Performed by: HOSPITALIST

## 2023-01-01 PROCEDURE — 3078F DIAST BP <80 MM HG: CPT | Mod: CPTII,S$GLB,,

## 2023-01-01 PROCEDURE — G0378 HOSPITAL OBSERVATION PER HR: HCPCS

## 2023-01-01 PROCEDURE — 1160F RVW MEDS BY RX/DR IN RCRD: CPT | Mod: CPTII,S$GLB,, | Performed by: PHYSICIAN ASSISTANT

## 2023-01-01 PROCEDURE — C9113 INJ PANTOPRAZOLE SODIUM, VIA: HCPCS | Performed by: COLON & RECTAL SURGERY

## 2023-01-01 PROCEDURE — 11000001 HC ACUTE MED/SURG PRIVATE ROOM

## 2023-01-01 PROCEDURE — 99999 PR PBB SHADOW E&M-EST. PATIENT-LVL V: CPT | Mod: PBBFAC,,, | Performed by: ANESTHESIOLOGY

## 2023-01-01 PROCEDURE — 1101F PT FALLS ASSESS-DOCD LE1/YR: CPT | Mod: CPTII,S$GLB,, | Performed by: ANESTHESIOLOGY

## 2023-01-01 PROCEDURE — 97116 GAIT TRAINING THERAPY: CPT

## 2023-01-01 PROCEDURE — 25000003 PHARM REV CODE 250: Performed by: HOSPITALIST

## 2023-01-01 PROCEDURE — 1159F PR MEDICATION LIST DOCUMENTED IN MEDICAL RECORD: ICD-10-PCS | Mod: CPTII,S$GLB,, | Performed by: ANESTHESIOLOGY

## 2023-01-01 PROCEDURE — 84466 ASSAY OF TRANSFERRIN: CPT | Performed by: INTERNAL MEDICINE

## 2023-01-01 PROCEDURE — 85025 COMPLETE CBC W/AUTO DIFF WBC: CPT | Performed by: EMERGENCY MEDICINE

## 2023-01-01 PROCEDURE — 99999 PR PBB SHADOW E&M-EST. PATIENT-LVL IV: ICD-10-PCS | Mod: PBBFAC,,, | Performed by: INTERNAL MEDICINE

## 2023-01-01 PROCEDURE — G0439 PR MEDICARE ANNUAL WELLNESS SUBSEQUENT VISIT: ICD-10-PCS | Mod: 95,,, | Performed by: NURSE PRACTITIONER

## 2023-01-01 PROCEDURE — 3288F FALL RISK ASSESSMENT DOCD: CPT | Mod: CPTII,95,, | Performed by: NURSE PRACTITIONER

## 2023-01-01 PROCEDURE — 21400001 HC TELEMETRY ROOM

## 2023-01-01 PROCEDURE — 3074F PR MOST RECENT SYSTOLIC BLOOD PRESSURE < 130 MM HG: ICD-10-PCS | Mod: CPTII,S$GLB,, | Performed by: NURSE PRACTITIONER

## 2023-01-01 PROCEDURE — 1101F PT FALLS ASSESS-DOCD LE1/YR: CPT | Mod: CPTII,S$GLB,, | Performed by: INTERNAL MEDICINE

## 2023-01-01 PROCEDURE — 1126F PR PAIN SEVERITY QUANTIFIED, NO PAIN PRESENT: ICD-10-PCS | Mod: CPTII,S$GLB,,

## 2023-01-01 PROCEDURE — 63600175 PHARM REV CODE 636 W HCPCS: Performed by: INTERNAL MEDICINE

## 2023-01-01 PROCEDURE — G0439 PPPS, SUBSEQ VISIT: HCPCS | Mod: 95,,, | Performed by: NURSE PRACTITIONER

## 2023-01-01 PROCEDURE — 1101F PT FALLS ASSESS-DOCD LE1/YR: CPT | Mod: CPTII,95,, | Performed by: NURSE PRACTITIONER

## 2023-01-01 PROCEDURE — 63600175 PHARM REV CODE 636 W HCPCS: Mod: JG | Performed by: INTERNAL MEDICINE

## 2023-01-01 PROCEDURE — 1159F MED LIST DOCD IN RCRD: CPT | Mod: CPTII,S$GLB,, | Performed by: NURSE PRACTITIONER

## 2023-01-01 PROCEDURE — 99223 1ST HOSP IP/OBS HIGH 75: CPT | Mod: ,,, | Performed by: INTERNAL MEDICINE

## 2023-01-01 PROCEDURE — 3288F PR FALLS RISK ASSESSMENT DOCUMENTED: ICD-10-PCS | Mod: CPTII,S$GLB,, | Performed by: FAMILY MEDICINE

## 2023-01-01 PROCEDURE — 83521 IG LIGHT CHAINS FREE EACH: CPT

## 2023-01-01 PROCEDURE — 94761 N-INVAS EAR/PLS OXIMETRY MLT: CPT

## 2023-01-01 PROCEDURE — 99024 PR POST-OP FOLLOW-UP VISIT: ICD-10-PCS | Mod: S$GLB,,, | Performed by: ANESTHESIOLOGY

## 2023-01-01 PROCEDURE — 93010 EKG 12-LEAD: ICD-10-PCS | Mod: ,,, | Performed by: INTERNAL MEDICINE

## 2023-01-01 PROCEDURE — 3078F PR MOST RECENT DIASTOLIC BLOOD PRESSURE < 80 MM HG: ICD-10-PCS | Mod: CPTII,S$GLB,, | Performed by: NURSE PRACTITIONER

## 2023-01-01 PROCEDURE — 99213 OFFICE O/P EST LOW 20 MIN: CPT | Mod: S$GLB,,, | Performed by: ANESTHESIOLOGY

## 2023-01-01 PROCEDURE — 73080 X-RAY EXAM OF ELBOW: CPT | Mod: 26,LT,, | Performed by: RADIOLOGY

## 2023-01-01 PROCEDURE — 94799 UNLISTED PULMONARY SVC/PX: CPT

## 2023-01-01 PROCEDURE — 1126F PR PAIN SEVERITY QUANTIFIED, NO PAIN PRESENT: ICD-10-PCS | Mod: CPTII,95,, | Performed by: NURSE PRACTITIONER

## 2023-01-01 PROCEDURE — 25000003 PHARM REV CODE 250: Performed by: COLON & RECTAL SURGERY

## 2023-01-01 PROCEDURE — 25000003 PHARM REV CODE 250: Performed by: FAMILY MEDICINE

## 2023-01-01 PROCEDURE — 36415 COLL VENOUS BLD VENIPUNCTURE: CPT | Performed by: FAMILY MEDICINE

## 2023-01-01 PROCEDURE — 74019 RADEX ABDOMEN 2 VIEWS: CPT | Mod: TC

## 2023-01-01 PROCEDURE — 1101F PT FALLS ASSESS-DOCD LE1/YR: CPT | Mod: CPTII,S$GLB,, | Performed by: NURSE PRACTITIONER

## 2023-01-01 PROCEDURE — 99900035 HC TECH TIME PER 15 MIN (STAT)

## 2023-01-01 PROCEDURE — 82746 ASSAY OF FOLIC ACID SERUM: CPT

## 2023-01-01 PROCEDURE — 85025 COMPLETE CBC W/AUTO DIFF WBC: CPT | Performed by: NURSE PRACTITIONER

## 2023-01-01 PROCEDURE — 1126F PR PAIN SEVERITY QUANTIFIED, NO PAIN PRESENT: ICD-10-PCS | Mod: CPTII,S$GLB,, | Performed by: FAMILY MEDICINE

## 2023-01-01 PROCEDURE — 3078F DIAST BP <80 MM HG: CPT | Mod: CPTII,S$GLB,, | Performed by: ANESTHESIOLOGY

## 2023-01-01 PROCEDURE — 80048 BASIC METABOLIC PNL TOTAL CA: CPT | Performed by: HOSPITALIST

## 2023-01-01 PROCEDURE — 25000003 PHARM REV CODE 250: Performed by: NURSE PRACTITIONER

## 2023-01-01 PROCEDURE — 94640 AIRWAY INHALATION TREATMENT: CPT

## 2023-01-01 PROCEDURE — 84484 ASSAY OF TROPONIN QUANT: CPT | Performed by: EMERGENCY MEDICINE

## 2023-01-01 PROCEDURE — 1125F PR PAIN SEVERITY QUANTIFIED, PAIN PRESENT: ICD-10-PCS | Mod: CPTII,S$GLB,, | Performed by: ANESTHESIOLOGY

## 2023-01-01 PROCEDURE — 36415 COLL VENOUS BLD VENIPUNCTURE: CPT | Performed by: HOSPITALIST

## 2023-01-01 PROCEDURE — 93010 EKG 12-LEAD: ICD-10-PCS | Mod: ,,, | Performed by: STUDENT IN AN ORGANIZED HEALTH CARE EDUCATION/TRAINING PROGRAM

## 2023-01-01 PROCEDURE — 3075F PR MOST RECENT SYSTOLIC BLOOD PRESS GE 130-139MM HG: ICD-10-PCS | Mod: CPTII,S$GLB,, | Performed by: PHYSICIAN ASSISTANT

## 2023-01-01 PROCEDURE — 25000003 PHARM REV CODE 250: Performed by: STUDENT IN AN ORGANIZED HEALTH CARE EDUCATION/TRAINING PROGRAM

## 2023-01-01 PROCEDURE — 85025 COMPLETE CBC W/AUTO DIFF WBC: CPT | Performed by: HOSPITALIST

## 2023-01-01 PROCEDURE — 1100F PTFALLS ASSESS-DOCD GE2>/YR: CPT | Mod: CPTII,S$GLB,, | Performed by: INTERNAL MEDICINE

## 2023-01-01 PROCEDURE — 1101F PR PT FALLS ASSESS DOC 0-1 FALLS W/OUT INJ PAST YR: ICD-10-PCS | Mod: CPTII,S$GLB,, | Performed by: ANESTHESIOLOGY

## 2023-01-01 PROCEDURE — 80053 COMPREHEN METABOLIC PANEL: CPT | Performed by: NURSE PRACTITIONER

## 2023-01-01 PROCEDURE — 1126F AMNT PAIN NOTED NONE PRSNT: CPT | Mod: CPTII,S$GLB,, | Performed by: ANESTHESIOLOGY

## 2023-01-01 PROCEDURE — 85025 COMPLETE CBC W/AUTO DIFF WBC: CPT | Performed by: INTERNAL MEDICINE

## 2023-01-01 PROCEDURE — 3078F DIAST BP <80 MM HG: CPT | Mod: CPTII,S$GLB,, | Performed by: NURSE PRACTITIONER

## 2023-01-01 PROCEDURE — 25000003 PHARM REV CODE 250: Performed by: INTERNAL MEDICINE

## 2023-01-01 PROCEDURE — 99233 SBSQ HOSP IP/OBS HIGH 50: CPT | Mod: ,,, | Performed by: INTERNAL MEDICINE

## 2023-01-01 PROCEDURE — 3078F DIAST BP <80 MM HG: CPT | Mod: CPTII,S$GLB,, | Performed by: FAMILY MEDICINE

## 2023-01-01 PROCEDURE — 25000242 PHARM REV CODE 250 ALT 637 W/ HCPCS: Performed by: NURSE PRACTITIONER

## 2023-01-01 PROCEDURE — 1126F AMNT PAIN NOTED NONE PRSNT: CPT | Mod: CPTII,S$GLB,, | Performed by: FAMILY MEDICINE

## 2023-01-01 PROCEDURE — 83605 ASSAY OF LACTIC ACID: CPT | Performed by: EMERGENCY MEDICINE

## 2023-01-01 PROCEDURE — 96374 THER/PROPH/DIAG INJ IV PUSH: CPT

## 2023-01-01 PROCEDURE — 99214 PR OFFICE/OUTPT VISIT, EST, LEVL IV, 30-39 MIN: ICD-10-PCS | Mod: S$GLB,,, | Performed by: NURSE PRACTITIONER

## 2023-01-01 PROCEDURE — 1159F MED LIST DOCD IN RCRD: CPT | Mod: CPTII,S$GLB,, | Performed by: ANESTHESIOLOGY

## 2023-01-01 PROCEDURE — 44140 PR PART REMOVAL COLON W ANASTOMOSIS: ICD-10-PCS | Mod: ,,, | Performed by: COLON & RECTAL SURGERY

## 2023-01-01 PROCEDURE — 72070 X-RAY EXAM THORAC SPINE 2VWS: CPT | Mod: TC

## 2023-01-01 PROCEDURE — 99223 PR INITIAL HOSPITAL CARE,LEVL III: ICD-10-PCS | Mod: ,,, | Performed by: ORTHOPAEDIC SURGERY

## 2023-01-01 PROCEDURE — 99496 TRANSJ CARE MGMT HIGH F2F 7D: CPT | Mod: S$GLB,,, | Performed by: FAMILY MEDICINE

## 2023-01-01 PROCEDURE — 83880 ASSAY OF NATRIURETIC PEPTIDE: CPT | Performed by: PHYSICIAN ASSISTANT

## 2023-01-01 PROCEDURE — 1160F PR REVIEW ALL MEDS BY PRESCRIBER/CLIN PHARMACIST DOCUMENTED: ICD-10-PCS | Mod: CPTII,S$GLB,, | Performed by: INTERNAL MEDICINE

## 2023-01-01 PROCEDURE — 1160F PR REVIEW ALL MEDS BY PRESCRIBER/CLIN PHARMACIST DOCUMENTED: ICD-10-PCS | Mod: CPTII,S$GLB,, | Performed by: ANESTHESIOLOGY

## 2023-01-01 PROCEDURE — 3074F SYST BP LT 130 MM HG: CPT | Mod: CPTII,S$GLB,, | Performed by: NURSE PRACTITIONER

## 2023-01-01 PROCEDURE — 27201423 OPTIME MED/SURG SUP & DEVICES STERILE SUPPLY: Performed by: ANESTHESIOLOGY

## 2023-01-01 PROCEDURE — 63650 IMPLANT NEUROELECTRODES: CPT | Performed by: ANESTHESIOLOGY

## 2023-01-01 PROCEDURE — 36415 COLL VENOUS BLD VENIPUNCTURE: CPT | Mod: PO

## 2023-01-01 PROCEDURE — 25000003 PHARM REV CODE 250: Performed by: EMERGENCY MEDICINE

## 2023-01-01 PROCEDURE — 99024 PR POST-OP FOLLOW-UP VISIT: ICD-10-PCS | Mod: ,,,

## 2023-01-01 PROCEDURE — 3288F PR FALLS RISK ASSESSMENT DOCUMENTED: ICD-10-PCS | Mod: CPTII,S$GLB,, | Performed by: NURSE PRACTITIONER

## 2023-01-01 PROCEDURE — 1111F DSCHRG MED/CURRENT MED MERGE: CPT | Mod: CPTII,S$GLB,, | Performed by: PHYSICIAN ASSISTANT

## 2023-01-01 PROCEDURE — 72070 XR THORACIC SPINE AP LATERAL: ICD-10-PCS | Mod: 26,,, | Performed by: RADIOLOGY

## 2023-01-01 PROCEDURE — 31500 INSERT EMERGENCY AIRWAY: CPT

## 2023-01-01 PROCEDURE — 71000033 HC RECOVERY, INTIAL HOUR: Performed by: COLON & RECTAL SURGERY

## 2023-01-01 PROCEDURE — 88305 TISSUE EXAM BY PATHOLOGIST: CPT | Mod: 26,,, | Performed by: PATHOLOGY

## 2023-01-01 PROCEDURE — 36000706: Performed by: ANESTHESIOLOGY

## 2023-01-01 PROCEDURE — 81000 URINALYSIS NONAUTO W/SCOPE: CPT | Performed by: NURSE PRACTITIONER

## 2023-01-01 PROCEDURE — 83880 ASSAY OF NATRIURETIC PEPTIDE: CPT | Performed by: INTERNAL MEDICINE

## 2023-01-01 PROCEDURE — 72114 X-RAY EXAM L-S SPINE BENDING: CPT | Mod: 26,,, | Performed by: RADIOLOGY

## 2023-01-01 PROCEDURE — 86920 COMPATIBILITY TEST SPIN: CPT | Performed by: HOSPITALIST

## 2023-01-01 PROCEDURE — 99024 POSTOP FOLLOW-UP VISIT: CPT | Mod: ,,,

## 2023-01-01 PROCEDURE — 86334 IMMUNOFIX E-PHORESIS SERUM: CPT | Mod: 26,,, | Performed by: PATHOLOGY

## 2023-01-01 PROCEDURE — 80053 COMPREHEN METABOLIC PANEL: CPT

## 2023-01-01 PROCEDURE — 99214 OFFICE O/P EST MOD 30 MIN: CPT | Mod: S$GLB,,, | Performed by: FAMILY MEDICINE

## 2023-01-01 PROCEDURE — 80053 COMPREHEN METABOLIC PANEL: CPT | Performed by: EMERGENCY MEDICINE

## 2023-01-01 PROCEDURE — 3078F PR MOST RECENT DIASTOLIC BLOOD PRESSURE < 80 MM HG: ICD-10-PCS | Mod: CPTII,S$GLB,, | Performed by: ANESTHESIOLOGY

## 2023-01-01 PROCEDURE — 1126F AMNT PAIN NOTED NONE PRSNT: CPT | Mod: CPTII,S$GLB,, | Performed by: INTERNAL MEDICINE

## 2023-01-01 PROCEDURE — 3288F FALL RISK ASSESSMENT DOCD: CPT | Mod: CPTII,S$GLB,, | Performed by: INTERNAL MEDICINE

## 2023-01-01 PROCEDURE — 3077F SYST BP >= 140 MM HG: CPT | Mod: CPTII,S$GLB,, | Performed by: INTERNAL MEDICINE

## 2023-01-01 PROCEDURE — 82728 ASSAY OF FERRITIN: CPT | Performed by: INTERNAL MEDICINE

## 2023-01-01 PROCEDURE — 1159F PR MEDICATION LIST DOCUMENTED IN MEDICAL RECORD: ICD-10-PCS | Mod: CPTII,S$GLB,, | Performed by: INTERNAL MEDICINE

## 2023-01-01 PROCEDURE — 96361 HYDRATE IV INFUSION ADD-ON: CPT

## 2023-01-01 PROCEDURE — 1159F PR MEDICATION LIST DOCUMENTED IN MEDICAL RECORD: ICD-10-PCS | Mod: CPTII,S$GLB,, | Performed by: NURSE PRACTITIONER

## 2023-01-01 PROCEDURE — 84484 ASSAY OF TROPONIN QUANT: CPT | Mod: 91 | Performed by: FAMILY MEDICINE

## 2023-01-01 PROCEDURE — 99999 PR PBB SHADOW E&M-EST. PATIENT-LVL IV: ICD-10-PCS | Mod: PBBFAC,,, | Performed by: FAMILY MEDICINE

## 2023-01-01 PROCEDURE — 1111F DSCHRG MED/CURRENT MED MERGE: CPT | Mod: CPTII,S$GLB,, | Performed by: FAMILY MEDICINE

## 2023-01-01 PROCEDURE — 25000003 PHARM REV CODE 250: Performed by: ANESTHESIOLOGY

## 2023-01-01 PROCEDURE — 27000221 HC OXYGEN, UP TO 24 HOURS

## 2023-01-01 PROCEDURE — 84484 ASSAY OF TROPONIN QUANT: CPT | Performed by: INTERNAL MEDICINE

## 2023-01-01 PROCEDURE — 1159F PR MEDICATION LIST DOCUMENTED IN MEDICAL RECORD: ICD-10-PCS | Mod: CPTII,S$GLB,, | Performed by: FAMILY MEDICINE

## 2023-01-01 PROCEDURE — 99499 UNLISTED E&M SERVICE: CPT | Mod: S$GLB,,, | Performed by: ANESTHESIOLOGY

## 2023-01-01 PROCEDURE — 97162 PT EVAL MOD COMPLEX 30 MIN: CPT

## 2023-01-01 PROCEDURE — 72070 X-RAY EXAM THORAC SPINE 2VWS: CPT | Mod: 26,,, | Performed by: RADIOLOGY

## 2023-01-01 PROCEDURE — 63600175 PHARM REV CODE 636 W HCPCS: Performed by: NURSE PRACTITIONER

## 2023-01-01 PROCEDURE — 63600175 PHARM REV CODE 636 W HCPCS: Mod: JZ,JG,EC | Performed by: NURSE PRACTITIONER

## 2023-01-01 PROCEDURE — 99213 PR OFFICE/OUTPT VISIT, EST, LEVL III, 20-29 MIN: ICD-10-PCS | Mod: S$GLB,,, | Performed by: ANESTHESIOLOGY

## 2023-01-01 PROCEDURE — 44140 PARTIAL REMOVAL OF COLON: CPT | Mod: ,,, | Performed by: COLON & RECTAL SURGERY

## 2023-01-01 PROCEDURE — 3075F SYST BP GE 130 - 139MM HG: CPT | Mod: CPTII,S$GLB,, | Performed by: PHYSICIAN ASSISTANT

## 2023-01-01 PROCEDURE — 97110 THERAPEUTIC EXERCISES: CPT

## 2023-01-01 PROCEDURE — 99999 PR PBB SHADOW E&M-EST. PATIENT-LVL IV: CPT | Mod: PBBFAC,,, | Performed by: FAMILY MEDICINE

## 2023-01-01 PROCEDURE — 99223 1ST HOSP IP/OBS HIGH 75: CPT | Mod: 57,,, | Performed by: COLON & RECTAL SURGERY

## 2023-01-01 PROCEDURE — 83735 ASSAY OF MAGNESIUM: CPT | Performed by: NURSE PRACTITIONER

## 2023-01-01 PROCEDURE — 93281 PM DEVICE PROGR EVAL MULTI: CPT

## 2023-01-01 PROCEDURE — 99285 EMERGENCY DEPT VISIT HI MDM: CPT | Mod: 25

## 2023-01-01 PROCEDURE — 3078F PR MOST RECENT DIASTOLIC BLOOD PRESSURE < 80 MM HG: ICD-10-PCS | Mod: CPTII,S$GLB,, | Performed by: PHYSICIAN ASSISTANT

## 2023-01-01 PROCEDURE — 99204 OFFICE O/P NEW MOD 45 MIN: CPT | Mod: S$GLB,,, | Performed by: PHYSICIAN ASSISTANT

## 2023-01-01 PROCEDURE — 1125F AMNT PAIN NOTED PAIN PRSNT: CPT | Mod: CPTII,S$GLB,, | Performed by: ANESTHESIOLOGY

## 2023-01-01 PROCEDURE — 3074F PR MOST RECENT SYSTOLIC BLOOD PRESSURE < 130 MM HG: ICD-10-PCS | Mod: CPTII,S$GLB,, | Performed by: ANESTHESIOLOGY

## 2023-01-01 PROCEDURE — 88342 IMHCHEM/IMCYTCHM 1ST ANTB: CPT | Performed by: PATHOLOGY

## 2023-01-01 PROCEDURE — 83735 ASSAY OF MAGNESIUM: CPT | Performed by: INTERNAL MEDICINE

## 2023-01-01 PROCEDURE — 3288F PR FALLS RISK ASSESSMENT DOCUMENTED: ICD-10-PCS | Mod: CPTII,S$GLB,, | Performed by: ANESTHESIOLOGY

## 2023-01-01 PROCEDURE — 71000015 HC POSTOP RECOV 1ST HR: Performed by: ANESTHESIOLOGY

## 2023-01-01 PROCEDURE — 93010 ELECTROCARDIOGRAM REPORT: CPT | Mod: ,,, | Performed by: INTERNAL MEDICINE

## 2023-01-01 PROCEDURE — 84466 ASSAY OF TRANSFERRIN: CPT

## 2023-01-01 PROCEDURE — 1126F PR PAIN SEVERITY QUANTIFIED, NO PAIN PRESENT: ICD-10-PCS | Mod: CPTII,S$GLB,, | Performed by: NURSE PRACTITIONER

## 2023-01-01 PROCEDURE — 88342 IMHCHEM/IMCYTCHM 1ST ANTB: CPT | Mod: 26,,, | Performed by: PATHOLOGY

## 2023-01-01 PROCEDURE — 1126F PR PAIN SEVERITY QUANTIFIED, NO PAIN PRESENT: ICD-10-PCS | Mod: CPTII,S$GLB,, | Performed by: ANESTHESIOLOGY

## 2023-01-01 PROCEDURE — 1101F PT FALLS ASSESS-DOCD LE1/YR: CPT | Mod: CPTII,S$GLB,, | Performed by: FAMILY MEDICINE

## 2023-01-01 PROCEDURE — 3288F PR FALLS RISK ASSESSMENT DOCUMENTED: ICD-10-PCS | Mod: CPTII,S$GLB,, | Performed by: INTERNAL MEDICINE

## 2023-01-01 PROCEDURE — 99999 PR PBB SHADOW E&M-EST. PATIENT-LVL IV: ICD-10-PCS | Mod: PBBFAC,,, | Performed by: NURSE PRACTITIONER

## 2023-01-01 PROCEDURE — 97530 THERAPEUTIC ACTIVITIES: CPT

## 2023-01-01 PROCEDURE — 36415 COLL VENOUS BLD VENIPUNCTURE: CPT | Performed by: RADIOLOGY

## 2023-01-01 PROCEDURE — 85025 COMPLETE CBC W/AUTO DIFF WBC: CPT | Performed by: COLON & RECTAL SURGERY

## 2023-01-01 PROCEDURE — 97161 PT EVAL LOW COMPLEX 20 MIN: CPT

## 2023-01-01 PROCEDURE — 99152 MOD SED SAME PHYS/QHP 5/>YRS: CPT | Performed by: ANESTHESIOLOGY

## 2023-01-01 PROCEDURE — 1160F PR REVIEW ALL MEDS BY PRESCRIBER/CLIN PHARMACIST DOCUMENTED: ICD-10-PCS | Mod: CPTII,S$GLB,, | Performed by: NURSE PRACTITIONER

## 2023-01-01 PROCEDURE — 82728 ASSAY OF FERRITIN: CPT

## 2023-01-01 PROCEDURE — 83090 ASSAY OF HOMOCYSTEINE: CPT

## 2023-01-01 PROCEDURE — 3078F PR MOST RECENT DIASTOLIC BLOOD PRESSURE < 80 MM HG: ICD-10-PCS | Mod: CPTII,S$GLB,, | Performed by: FAMILY MEDICINE

## 2023-01-01 PROCEDURE — 1126F AMNT PAIN NOTED NONE PRSNT: CPT | Mod: CPTII,95,, | Performed by: NURSE PRACTITIONER

## 2023-01-01 PROCEDURE — 85025 COMPLETE CBC W/AUTO DIFF WBC: CPT | Performed by: PHYSICIAN ASSISTANT

## 2023-01-01 PROCEDURE — 63600175 PHARM REV CODE 636 W HCPCS: Performed by: EMERGENCY MEDICINE

## 2023-01-01 PROCEDURE — 99214 PR OFFICE/OUTPT VISIT, EST, LEVL IV, 30-39 MIN: ICD-10-PCS | Mod: S$GLB,,, | Performed by: FAMILY MEDICINE

## 2023-01-01 PROCEDURE — 3288F FALL RISK ASSESSMENT DOCD: CPT | Mod: CPTII,S$GLB,, | Performed by: ANESTHESIOLOGY

## 2023-01-01 PROCEDURE — 1101F PR PT FALLS ASSESS DOC 0-1 FALLS W/OUT INJ PAST YR: ICD-10-PCS | Mod: CPTII,S$GLB,, | Performed by: NURSE PRACTITIONER

## 2023-01-01 PROCEDURE — 99214 PR OFFICE/OUTPT VISIT, EST, LEVL IV, 30-39 MIN: ICD-10-PCS | Mod: S$GLB,,, | Performed by: ANESTHESIOLOGY

## 2023-01-01 PROCEDURE — 3074F PR MOST RECENT SYSTOLIC BLOOD PRESSURE < 130 MM HG: ICD-10-PCS | Mod: CPTII,S$GLB,, | Performed by: FAMILY MEDICINE

## 2023-01-01 PROCEDURE — 1170F PR FUNCTIONAL STATUS ASSESSED: ICD-10-PCS | Mod: CPTII,95,, | Performed by: NURSE PRACTITIONER

## 2023-01-01 PROCEDURE — 99291 CRITICAL CARE FIRST HOUR: CPT

## 2023-01-01 PROCEDURE — 99233 PR SUBSEQUENT HOSPITAL CARE,LEVL III: ICD-10-PCS | Mod: ,,, | Performed by: INTERNAL MEDICINE

## 2023-01-01 PROCEDURE — C9290 INJ, BUPIVACAINE LIPOSOME: HCPCS | Performed by: COLON & RECTAL SURGERY

## 2023-01-01 PROCEDURE — 96372 THER/PROPH/DIAG INJ SC/IM: CPT

## 2023-01-01 PROCEDURE — 81000 URINALYSIS NONAUTO W/SCOPE: CPT | Performed by: EMERGENCY MEDICINE

## 2023-01-01 PROCEDURE — 36415 COLL VENOUS BLD VENIPUNCTURE: CPT | Performed by: INTERNAL MEDICINE

## 2023-01-01 PROCEDURE — 93005 ELECTROCARDIOGRAM TRACING: CPT

## 2023-01-01 PROCEDURE — 85025 COMPLETE CBC W/AUTO DIFF WBC: CPT

## 2023-01-01 PROCEDURE — 1160F RVW MEDS BY RX/DR IN RCRD: CPT | Mod: CPTII,S$GLB,, | Performed by: NURSE PRACTITIONER

## 2023-01-01 PROCEDURE — 87186 SC STD MICRODIL/AGAR DIL: CPT | Performed by: EMERGENCY MEDICINE

## 2023-01-01 PROCEDURE — 3075F PR MOST RECENT SYSTOLIC BLOOD PRESS GE 130-139MM HG: ICD-10-PCS | Mod: CPTII,S$GLB,,

## 2023-01-01 PROCEDURE — 88307 TISSUE EXAM BY PATHOLOGIST: CPT | Performed by: PATHOLOGY

## 2023-01-01 PROCEDURE — 99999 PR PBB SHADOW E&M-EST. PATIENT-LVL IV: CPT | Mod: PBBFAC,,, | Performed by: INTERNAL MEDICINE

## 2023-01-01 PROCEDURE — 99214 PR OFFICE/OUTPT VISIT, EST, LEVL IV, 30-39 MIN: ICD-10-PCS | Mod: 25,S$GLB,, | Performed by: NURSE PRACTITIONER

## 2023-01-01 PROCEDURE — 1111F PR DISCHARGE MEDS RECONCILED W/ CURRENT OUTPATIENT MED LIST: ICD-10-PCS | Mod: CPTII,S$GLB,, | Performed by: FAMILY MEDICINE

## 2023-01-01 PROCEDURE — 3288F FALL RISK ASSESSMENT DOCD: CPT | Mod: CPTII,S$GLB,, | Performed by: FAMILY MEDICINE

## 2023-01-01 PROCEDURE — 99214 OFFICE O/P EST MOD 30 MIN: CPT | Mod: S$GLB,,, | Performed by: ANESTHESIOLOGY

## 2023-01-01 PROCEDURE — 86334 PATHOLOGIST INTERPRETATION IFE: ICD-10-PCS | Mod: 26,,, | Performed by: PATHOLOGY

## 2023-01-01 PROCEDURE — 99223 PR INITIAL HOSPITAL CARE,LEVL III: ICD-10-PCS | Mod: 57,,, | Performed by: COLON & RECTAL SURGERY

## 2023-01-01 PROCEDURE — 63650 IMPLANT NEUROELECTRODES: CPT | Mod: ,,, | Performed by: ANESTHESIOLOGY

## 2023-01-01 PROCEDURE — 99232 PR SUBSEQUENT HOSPITAL CARE,LEVL II: ICD-10-PCS | Mod: ,,, | Performed by: PHYSICIAN ASSISTANT

## 2023-01-01 PROCEDURE — 84100 ASSAY OF PHOSPHORUS: CPT | Performed by: NURSE PRACTITIONER

## 2023-01-01 PROCEDURE — 99999 PR PBB SHADOW E&M-EST. PATIENT-LVL III: ICD-10-PCS | Mod: PBBFAC,,, | Performed by: PHYSICIAN ASSISTANT

## 2023-01-01 PROCEDURE — 3079F DIAST BP 80-89 MM HG: CPT | Mod: CPTII,S$GLB,, | Performed by: ANESTHESIOLOGY

## 2023-01-01 PROCEDURE — C1778 LEAD, NEUROSTIMULATOR: HCPCS | Performed by: ANESTHESIOLOGY

## 2023-01-01 PROCEDURE — 3078F DIAST BP <80 MM HG: CPT | Mod: CPTII,S$GLB,, | Performed by: PHYSICIAN ASSISTANT

## 2023-01-01 PROCEDURE — 99499 RISK ADDL DX/OHS AUDIT: ICD-10-PCS | Mod: S$GLB,,, | Performed by: ANESTHESIOLOGY

## 2023-01-01 PROCEDURE — 97110 THERAPEUTIC EXERCISES: CPT | Mod: CQ

## 2023-01-01 PROCEDURE — 3288F FALL RISK ASSESSMENT DOCD: CPT | Mod: CPTII,S$GLB,, | Performed by: PHYSICIAN ASSISTANT

## 2023-01-01 PROCEDURE — P9040 RBC LEUKOREDUCED IRRADIATED: HCPCS | Performed by: HOSPITALIST

## 2023-01-01 PROCEDURE — 3075F SYST BP GE 130 - 139MM HG: CPT | Mod: CPTII,S$GLB,, | Performed by: INTERNAL MEDICINE

## 2023-01-01 PROCEDURE — 84165 PROTEIN E-PHORESIS SERUM: CPT | Mod: 26,,, | Performed by: PATHOLOGY

## 2023-01-01 PROCEDURE — 99232 SBSQ HOSP IP/OBS MODERATE 35: CPT | Mod: ,,, | Performed by: PHYSICIAN ASSISTANT

## 2023-01-01 PROCEDURE — 36415 COLL VENOUS BLD VENIPUNCTURE: CPT | Performed by: PHYSICIAN ASSISTANT

## 2023-01-01 PROCEDURE — 3075F PR MOST RECENT SYSTOLIC BLOOD PRESS GE 130-139MM HG: ICD-10-PCS | Mod: CPTII,S$GLB,, | Performed by: FAMILY MEDICINE

## 2023-01-01 PROCEDURE — 1159F PR MEDICATION LIST DOCUMENTED IN MEDICAL RECORD: ICD-10-PCS | Mod: CPTII,S$GLB,, | Performed by: PHYSICIAN ASSISTANT

## 2023-01-01 PROCEDURE — 83880 ASSAY OF NATRIURETIC PEPTIDE: CPT | Performed by: EMERGENCY MEDICINE

## 2023-01-01 PROCEDURE — 87088 URINE BACTERIA CULTURE: CPT | Performed by: EMERGENCY MEDICINE

## 2023-01-01 PROCEDURE — 36430 TRANSFUSION BLD/BLD COMPNT: CPT

## 2023-01-01 PROCEDURE — 63685 PR IMPLANT SPINAL NEUROSTIM/RECEIVER: ICD-10-PCS | Mod: ,,, | Performed by: ANESTHESIOLOGY

## 2023-01-01 PROCEDURE — 84165 PATHOLOGIST INTERPRETATION SPE: ICD-10-PCS | Mod: 26,,, | Performed by: PATHOLOGY

## 2023-01-01 PROCEDURE — 1111F PR DISCHARGE MEDS RECONCILED W/ CURRENT OUTPATIENT MED LIST: ICD-10-PCS | Mod: CPTII,S$GLB,, | Performed by: PHYSICIAN ASSISTANT

## 2023-01-01 PROCEDURE — 93294 CARDIAC DEVICE CHECK - REMOTE: ICD-10-PCS | Mod: S$GLB,,, | Performed by: INTERNAL MEDICINE

## 2023-01-01 PROCEDURE — 82570 ASSAY OF URINE CREATININE: CPT | Performed by: INTERNAL MEDICINE

## 2023-01-01 PROCEDURE — 1126F PR PAIN SEVERITY QUANTIFIED, NO PAIN PRESENT: ICD-10-PCS | Mod: CPTII,S$GLB,, | Performed by: PHYSICIAN ASSISTANT

## 2023-01-01 PROCEDURE — 74019 RADEX ABDOMEN 2 VIEWS: CPT | Mod: 26,,, | Performed by: RADIOLOGY

## 2023-01-01 PROCEDURE — 84484 ASSAY OF TROPONIN QUANT: CPT | Performed by: NURSE PRACTITIONER

## 2023-01-01 PROCEDURE — 3078F DIAST BP <80 MM HG: CPT | Mod: CPTII,S$GLB,, | Performed by: INTERNAL MEDICINE

## 2023-01-01 PROCEDURE — 1101F PR PT FALLS ASSESS DOC 0-1 FALLS W/OUT INJ PAST YR: ICD-10-PCS | Mod: CPTII,S$GLB,, | Performed by: FAMILY MEDICINE

## 2023-01-01 PROCEDURE — 80048 BASIC METABOLIC PNL TOTAL CA: CPT | Performed by: INTERNAL MEDICINE

## 2023-01-01 PROCEDURE — 93010 ELECTROCARDIOGRAM REPORT: CPT | Mod: ,,, | Performed by: STUDENT IN AN ORGANIZED HEALTH CARE EDUCATION/TRAINING PROGRAM

## 2023-01-01 PROCEDURE — 27201423 OPTIME MED/SURG SUP & DEVICES STERILE SUPPLY: Performed by: COLON & RECTAL SURGERY

## 2023-01-01 PROCEDURE — 99999 PR PBB SHADOW E&M-EST. PATIENT-LVL V: ICD-10-PCS | Mod: PBBFAC,,, | Performed by: FAMILY MEDICINE

## 2023-01-01 PROCEDURE — 1101F PR PT FALLS ASSESS DOC 0-1 FALLS W/OUT INJ PAST YR: ICD-10-PCS | Mod: CPTII,S$GLB,, | Performed by: PHYSICIAN ASSISTANT

## 2023-01-01 PROCEDURE — C1729 CATH, DRAINAGE: HCPCS | Performed by: COLON & RECTAL SURGERY

## 2023-01-01 PROCEDURE — 99214 PR OFFICE/OUTPT VISIT, EST, LEVL IV, 30-39 MIN: ICD-10-PCS | Mod: S$GLB,,, | Performed by: PHYSICIAN ASSISTANT

## 2023-01-01 PROCEDURE — 1111F DSCHRG MED/CURRENT MED MERGE: CPT | Mod: CPTII,S$GLB,, | Performed by: INTERNAL MEDICINE

## 2023-01-01 PROCEDURE — 37000008 HC ANESTHESIA 1ST 15 MINUTES: Performed by: ANESTHESIOLOGY

## 2023-01-01 PROCEDURE — 1159F MED LIST DOCD IN RCRD: CPT | Mod: CPTII,S$GLB,, | Performed by: FAMILY MEDICINE

## 2023-01-01 PROCEDURE — 3288F FALL RISK ASSESSMENT DOCD: CPT | Mod: CPTII,S$GLB,,

## 2023-01-01 PROCEDURE — 80048 BASIC METABOLIC PNL TOTAL CA: CPT | Performed by: FAMILY MEDICINE

## 2023-01-01 PROCEDURE — 97166 OT EVAL MOD COMPLEX 45 MIN: CPT

## 2023-01-01 PROCEDURE — 99999 PR PBB SHADOW E&M-EST. PATIENT-LVL V: ICD-10-PCS | Mod: PBBFAC,,,

## 2023-01-01 PROCEDURE — 93294 REM INTERROG EVL PM/LDLS PM: CPT | Mod: S$GLB,,, | Performed by: INTERNAL MEDICINE

## 2023-01-01 PROCEDURE — 99999 PR PBB SHADOW E&M-EST. PATIENT-LVL IV: CPT | Mod: PBBFAC,,, | Performed by: NURSE PRACTITIONER

## 2023-01-01 PROCEDURE — 1126F AMNT PAIN NOTED NONE PRSNT: CPT | Mod: CPTII,S$GLB,, | Performed by: PHYSICIAN ASSISTANT

## 2023-01-01 PROCEDURE — 88304 TISSUE EXAM BY PATHOLOGIST: CPT | Performed by: PATHOLOGY

## 2023-01-01 PROCEDURE — 99024 POSTOP FOLLOW-UP VISIT: CPT | Mod: S$GLB,,, | Performed by: ANESTHESIOLOGY

## 2023-01-01 PROCEDURE — 1159F MED LIST DOCD IN RCRD: CPT | Mod: CPTII,S$GLB,, | Performed by: INTERNAL MEDICINE

## 2023-01-01 PROCEDURE — 63685 INS/RPLC SPI NPG/RCVR POCKET: CPT | Mod: ,,, | Performed by: ANESTHESIOLOGY

## 2023-01-01 PROCEDURE — 36000707: Performed by: ANESTHESIOLOGY

## 2023-01-01 PROCEDURE — 87086 URINE CULTURE/COLONY COUNT: CPT | Performed by: EMERGENCY MEDICINE

## 2023-01-01 PROCEDURE — 80053 COMPREHEN METABOLIC PANEL: CPT | Performed by: HOSPITALIST

## 2023-01-01 PROCEDURE — 37000009 HC ANESTHESIA EA ADD 15 MINS: Performed by: COLON & RECTAL SURGERY

## 2023-01-01 PROCEDURE — 3075F SYST BP GE 130 - 139MM HG: CPT | Mod: CPTII,S$GLB,, | Performed by: FAMILY MEDICINE

## 2023-01-01 PROCEDURE — 83735 ASSAY OF MAGNESIUM: CPT | Performed by: EMERGENCY MEDICINE

## 2023-01-01 PROCEDURE — 93296 REM INTERROG EVL PM/IDS: CPT | Performed by: INTERNAL MEDICINE

## 2023-01-01 PROCEDURE — 63600175 PHARM REV CODE 636 W HCPCS: Performed by: ANESTHESIOLOGY

## 2023-01-01 PROCEDURE — 83690 ASSAY OF LIPASE: CPT | Performed by: EMERGENCY MEDICINE

## 2023-01-01 PROCEDURE — 3288F PR FALLS RISK ASSESSMENT DOCUMENTED: ICD-10-PCS | Mod: CPTII,S$GLB,,

## 2023-01-01 PROCEDURE — 99999 PR PBB SHADOW E&M-EST. PATIENT-LVL III: CPT | Mod: PBBFAC,,, | Performed by: ANESTHESIOLOGY

## 2023-01-01 PROCEDURE — 99999 PR PBB SHADOW E&M-EST. PATIENT-LVL III: CPT | Mod: PBBFAC,,, | Performed by: PHYSICIAN ASSISTANT

## 2023-01-01 PROCEDURE — 94002 VENT MGMT INPAT INIT DAY: CPT

## 2023-01-01 PROCEDURE — 36000709 HC OR TIME LEV III EA ADD 15 MIN: Performed by: COLON & RECTAL SURGERY

## 2023-01-01 PROCEDURE — 51798 US URINE CAPACITY MEASURE: CPT

## 2023-01-01 PROCEDURE — 37000008 HC ANESTHESIA 1ST 15 MINUTES: Performed by: COLON & RECTAL SURGERY

## 2023-01-01 PROCEDURE — 88307 PR  SURG PATH,LEVEL V: ICD-10-PCS | Mod: 26,,, | Performed by: PATHOLOGY

## 2023-01-01 PROCEDURE — 63650 PR PERCUT IMPLNT NEUROELECT,EPIDURAL: ICD-10-PCS | Mod: ,,, | Performed by: ANESTHESIOLOGY

## 2023-01-01 PROCEDURE — 99214 OFFICE O/P EST MOD 30 MIN: CPT | Mod: S$GLB,,, | Performed by: PHYSICIAN ASSISTANT

## 2023-01-01 PROCEDURE — 97530 THERAPEUTIC ACTIVITIES: CPT | Mod: CQ

## 2023-01-01 PROCEDURE — 3288F PR FALLS RISK ASSESSMENT DOCUMENTED: ICD-10-PCS | Mod: CPTII,95,, | Performed by: NURSE PRACTITIONER

## 2023-01-01 PROCEDURE — 1160F PR REVIEW ALL MEDS BY PRESCRIBER/CLIN PHARMACIST DOCUMENTED: ICD-10-PCS | Mod: CPTII,95,, | Performed by: NURSE PRACTITIONER

## 2023-01-01 PROCEDURE — 1159F MED LIST DOCD IN RCRD: CPT | Mod: CPTII,95,, | Performed by: NURSE PRACTITIONER

## 2023-01-01 PROCEDURE — 85025 COMPLETE CBC W/AUTO DIFF WBC: CPT | Performed by: FAMILY MEDICINE

## 2023-01-01 PROCEDURE — 93281 CARDIAC DEVICE CHECK - IN CLINIC & HOSPITAL: ICD-10-PCS | Mod: 26,,, | Performed by: NURSE PRACTITIONER

## 2023-01-01 PROCEDURE — 73080 X-RAY EXAM OF ELBOW: CPT | Mod: TC,PO,LT

## 2023-01-01 PROCEDURE — 1101F PT FALLS ASSESS-DOCD LE1/YR: CPT | Mod: CPTII,S$GLB,,

## 2023-01-01 PROCEDURE — 3075F PR MOST RECENT SYSTOLIC BLOOD PRESS GE 130-139MM HG: ICD-10-PCS | Mod: CPTII,S$GLB,, | Performed by: ANESTHESIOLOGY

## 2023-01-01 PROCEDURE — 96372 THER/PROPH/DIAG INJ SC/IM: CPT | Performed by: INTERNAL MEDICINE

## 2023-01-01 PROCEDURE — 96376 TX/PRO/DX INJ SAME DRUG ADON: CPT

## 2023-01-01 PROCEDURE — 99999 PR PBB SHADOW E&M-EST. PATIENT-LVL IV: ICD-10-PCS | Mod: PBBFAC,,, | Performed by: PHYSICIAN ASSISTANT

## 2023-01-01 PROCEDURE — 72114 XR LUMBAR SPINE 5 VIEW WITH FLEX AND EXT: ICD-10-PCS | Mod: 26,,, | Performed by: RADIOLOGY

## 2023-01-01 PROCEDURE — 3078F DIAST BP <80 MM HG: CPT | Mod: CPTII,95,, | Performed by: NURSE PRACTITIONER

## 2023-01-01 PROCEDURE — 96365 THER/PROPH/DIAG IV INF INIT: CPT

## 2023-01-01 PROCEDURE — 63600175 PHARM REV CODE 636 W HCPCS: Performed by: FAMILY MEDICINE

## 2023-01-01 PROCEDURE — 99223 PR INITIAL HOSPITAL CARE,LEVL III: ICD-10-PCS | Mod: ,,, | Performed by: INTERNAL MEDICINE

## 2023-01-01 PROCEDURE — 88304 TISSUE EXAM BY PATHOLOGIST: CPT | Mod: 26,,, | Performed by: PATHOLOGY

## 2023-01-01 PROCEDURE — 84165 PROTEIN E-PHORESIS SERUM: CPT

## 2023-01-01 PROCEDURE — 88342 CHG IMMUNOCYTOCHEMISTRY: ICD-10-PCS | Mod: 26,,, | Performed by: PATHOLOGY

## 2023-01-01 PROCEDURE — 85651 RBC SED RATE NONAUTOMATED: CPT | Performed by: HOSPITALIST

## 2023-01-01 PROCEDURE — 3288F FALL RISK ASSESSMENT DOCD: CPT | Mod: CPTII,S$GLB,, | Performed by: NURSE PRACTITIONER

## 2023-01-01 PROCEDURE — 99223 1ST HOSP IP/OBS HIGH 75: CPT | Mod: ,,, | Performed by: PHYSICIAN ASSISTANT

## 2023-01-01 PROCEDURE — 99214 OFFICE O/P EST MOD 30 MIN: CPT | Mod: S$GLB,,, | Performed by: INTERNAL MEDICINE

## 2023-01-01 PROCEDURE — 97116 GAIT TRAINING THERAPY: CPT | Mod: CQ

## 2023-01-01 PROCEDURE — 99214 PR OFFICE/OUTPT VISIT, EST, LEVL IV, 30-39 MIN: ICD-10-PCS | Mod: ,,, | Performed by: INTERNAL MEDICINE

## 2023-01-01 PROCEDURE — S5010 5% DEXTROSE AND 0.45% SALINE: HCPCS | Performed by: FAMILY MEDICINE

## 2023-01-01 PROCEDURE — 36000708 HC OR TIME LEV III 1ST 15 MIN: Performed by: COLON & RECTAL SURGERY

## 2023-01-01 PROCEDURE — 1100F PR PT FALLS ASSESS DOC 2+ FALLS/FALL W/INJURY/YR: ICD-10-PCS | Mod: CPTII,S$GLB,, | Performed by: INTERNAL MEDICINE

## 2023-01-01 PROCEDURE — 84484 ASSAY OF TROPONIN QUANT: CPT | Mod: 91 | Performed by: NURSE PRACTITIONER

## 2023-01-01 PROCEDURE — 25000003 PHARM REV CODE 250: Performed by: NURSE ANESTHETIST, CERTIFIED REGISTERED

## 2023-01-01 PROCEDURE — 1160F RVW MEDS BY RX/DR IN RCRD: CPT | Mod: CPTII,S$GLB,, | Performed by: ANESTHESIOLOGY

## 2023-01-01 PROCEDURE — 99215 PR OFFICE/OUTPT VISIT, EST, LEVL V, 40-54 MIN: ICD-10-PCS | Mod: S$GLB,,,

## 2023-01-01 PROCEDURE — 99999 PR PBB SHADOW E&M-EST. PATIENT-LVL V: CPT | Mod: PBBFAC,,,

## 2023-01-01 PROCEDURE — 86334 IMMUNOFIX E-PHORESIS SERUM: CPT

## 2023-01-01 PROCEDURE — 3074F SYST BP LT 130 MM HG: CPT | Mod: CPTII,95,, | Performed by: NURSE PRACTITIONER

## 2023-01-01 PROCEDURE — 96374 THER/PROPH/DIAG INJ IV PUSH: CPT | Mod: 59

## 2023-01-01 PROCEDURE — 1126F PR PAIN SEVERITY QUANTIFIED, NO PAIN PRESENT: ICD-10-PCS | Mod: CPTII,S$GLB,, | Performed by: INTERNAL MEDICINE

## 2023-01-01 PROCEDURE — 1126F AMNT PAIN NOTED NONE PRSNT: CPT | Mod: CPTII,S$GLB,,

## 2023-01-01 PROCEDURE — 25000242 PHARM REV CODE 250 ALT 637 W/ HCPCS: Performed by: FAMILY MEDICINE

## 2023-01-01 PROCEDURE — 99999 PR PBB SHADOW E&M-EST. PATIENT-LVL III: ICD-10-PCS | Mod: PBBFAC,,, | Performed by: ANESTHESIOLOGY

## 2023-01-01 PROCEDURE — 99223 1ST HOSP IP/OBS HIGH 75: CPT | Mod: ,,, | Performed by: ORTHOPAEDIC SURGERY

## 2023-01-01 PROCEDURE — 1159F MED LIST DOCD IN RCRD: CPT | Mod: CPTII,S$GLB,, | Performed by: PHYSICIAN ASSISTANT

## 2023-01-01 PROCEDURE — 36415 COLL VENOUS BLD VENIPUNCTURE: CPT | Performed by: NURSE PRACTITIONER

## 2023-01-01 PROCEDURE — 86900 BLOOD TYPING SEROLOGIC ABO: CPT | Performed by: HOSPITALIST

## 2023-01-01 PROCEDURE — 82607 VITAMIN B-12: CPT

## 2023-01-01 PROCEDURE — 3074F PR MOST RECENT SYSTOLIC BLOOD PRESSURE < 130 MM HG: ICD-10-PCS | Mod: CPTII,95,, | Performed by: NURSE PRACTITIONER

## 2023-01-01 PROCEDURE — 80048 BASIC METABOLIC PNL TOTAL CA: CPT | Performed by: NURSE PRACTITIONER

## 2023-01-01 PROCEDURE — 84300 ASSAY OF URINE SODIUM: CPT | Performed by: INTERNAL MEDICINE

## 2023-01-01 PROCEDURE — 3075F SYST BP GE 130 - 139MM HG: CPT | Mod: CPTII,S$GLB,, | Performed by: ANESTHESIOLOGY

## 2023-01-01 PROCEDURE — 1160F PR REVIEW ALL MEDS BY PRESCRIBER/CLIN PHARMACIST DOCUMENTED: ICD-10-PCS | Mod: CPTII,S$GLB,, | Performed by: PHYSICIAN ASSISTANT

## 2023-01-01 PROCEDURE — 1170F FXNL STATUS ASSESSED: CPT | Mod: CPTII,95,, | Performed by: NURSE PRACTITIONER

## 2023-01-01 PROCEDURE — 1126F AMNT PAIN NOTED NONE PRSNT: CPT | Mod: CPTII,S$GLB,, | Performed by: NURSE PRACTITIONER

## 2023-01-01 PROCEDURE — 63600175 PHARM REV CODE 636 W HCPCS: Mod: JZ,JG | Performed by: NURSE PRACTITIONER

## 2023-01-01 PROCEDURE — 99153 MOD SED SAME PHYS/QHP EA: CPT | Performed by: ANESTHESIOLOGY

## 2023-01-01 PROCEDURE — 99215 OFFICE O/P EST HI 40 MIN: CPT | Mod: S$GLB,,, | Performed by: INTERNAL MEDICINE

## 2023-01-01 PROCEDURE — 80048 BASIC METABOLIC PNL TOTAL CA: CPT | Performed by: COLON & RECTAL SURGERY

## 2023-01-01 PROCEDURE — 72114 X-RAY EXAM L-S SPINE BENDING: CPT | Mod: TC

## 2023-01-01 PROCEDURE — 3078F PR MOST RECENT DIASTOLIC BLOOD PRESSURE < 80 MM HG: ICD-10-PCS | Mod: CPTII,S$GLB,, | Performed by: INTERNAL MEDICINE

## 2023-01-01 PROCEDURE — 1101F PR PT FALLS ASSESS DOC 0-1 FALLS W/OUT INJ PAST YR: ICD-10-PCS | Mod: CPTII,S$GLB,, | Performed by: INTERNAL MEDICINE

## 2023-01-01 PROCEDURE — 1101F PR PT FALLS ASSESS DOC 0-1 FALLS W/OUT INJ PAST YR: ICD-10-PCS | Mod: CPTII,S$GLB,,

## 2023-01-01 PROCEDURE — 96375 TX/PRO/DX INJ NEW DRUG ADDON: CPT

## 2023-01-01 PROCEDURE — C1820 GENERATOR NEURO RECHG BAT SY: HCPCS | Performed by: ANESTHESIOLOGY

## 2023-01-01 PROCEDURE — 88305 TISSUE EXAM BY PATHOLOGIST: CPT | Performed by: PATHOLOGY

## 2023-01-01 PROCEDURE — 99496 TRANSITIONAL CARE MANAGE SERVICE 7 DAY DISCHARGE: ICD-10-PCS | Mod: S$GLB,,, | Performed by: FAMILY MEDICINE

## 2023-01-01 PROCEDURE — 99214 PR OFFICE/OUTPT VISIT, EST, LEVL IV, 30-39 MIN: ICD-10-PCS | Mod: S$GLB,,, | Performed by: INTERNAL MEDICINE

## 2023-01-01 PROCEDURE — 73080 XR ELBOW COMPLETE 3 VIEW LEFT: ICD-10-PCS | Mod: 26,LT,, | Performed by: RADIOLOGY

## 2023-01-01 PROCEDURE — 88305 TISSUE EXAM BY PATHOLOGIST: ICD-10-PCS | Mod: 26,,, | Performed by: PATHOLOGY

## 2023-01-01 PROCEDURE — 37000009 HC ANESTHESIA EA ADD 15 MINS: Performed by: ANESTHESIOLOGY

## 2023-01-01 PROCEDURE — 1160F RVW MEDS BY RX/DR IN RCRD: CPT | Mod: CPTII,S$GLB,, | Performed by: INTERNAL MEDICINE

## 2023-01-01 PROCEDURE — 83880 ASSAY OF NATRIURETIC PEPTIDE: CPT | Performed by: NURSE PRACTITIONER

## 2023-01-01 PROCEDURE — 63600175 PHARM REV CODE 636 W HCPCS: Performed by: STUDENT IN AN ORGANIZED HEALTH CARE EDUCATION/TRAINING PROGRAM

## 2023-01-01 PROCEDURE — 87077 CULTURE AEROBIC IDENTIFY: CPT | Performed by: EMERGENCY MEDICINE

## 2023-01-01 PROCEDURE — 96366 THER/PROPH/DIAG IV INF ADDON: CPT

## 2023-01-01 PROCEDURE — 3074F SYST BP LT 130 MM HG: CPT | Mod: CPTII,S$GLB,, | Performed by: FAMILY MEDICINE

## 2023-01-01 PROCEDURE — 99291 PR CRITICAL CARE, E/M 30-74 MINUTES: ICD-10-PCS | Mod: ,,, | Performed by: INTERNAL MEDICINE

## 2023-01-01 PROCEDURE — 99214 OFFICE O/P EST MOD 30 MIN: CPT | Mod: 25,S$GLB,, | Performed by: NURSE PRACTITIONER

## 2023-01-01 PROCEDURE — 3078F PR MOST RECENT DIASTOLIC BLOOD PRESSURE < 80 MM HG: ICD-10-PCS | Mod: CPTII,S$GLB,,

## 2023-01-01 PROCEDURE — 1125F PR PAIN SEVERITY QUANTIFIED, PAIN PRESENT: ICD-10-PCS | Mod: CPTII,S$GLB,, | Performed by: PHYSICIAN ASSISTANT

## 2023-01-01 PROCEDURE — 88307 TISSUE EXAM BY PATHOLOGIST: CPT | Mod: 26,,, | Performed by: PATHOLOGY

## 2023-01-01 PROCEDURE — 3077F PR MOST RECENT SYSTOLIC BLOOD PRESSURE >= 140 MM HG: ICD-10-PCS | Mod: CPTII,S$GLB,, | Performed by: INTERNAL MEDICINE

## 2023-01-01 PROCEDURE — 99999 PR PBB SHADOW E&M-EST. PATIENT-LVL IV: CPT | Mod: PBBFAC,,, | Performed by: ANESTHESIOLOGY

## 2023-01-01 PROCEDURE — 74019 XR ABDOMEN FLAT AND ERECT: ICD-10-PCS | Mod: 26,,, | Performed by: RADIOLOGY

## 2023-01-01 PROCEDURE — 3078F PR MOST RECENT DIASTOLIC BLOOD PRESSURE < 80 MM HG: ICD-10-PCS | Mod: CPTII,95,, | Performed by: NURSE PRACTITIONER

## 2023-01-01 PROCEDURE — 99999 PR PBB SHADOW E&M-EST. PATIENT-LVL IV: CPT | Mod: PBBFAC,,, | Performed by: PHYSICIAN ASSISTANT

## 2023-01-01 PROCEDURE — 99999 PR PBB SHADOW E&M-EST. PATIENT-LVL V: ICD-10-PCS | Mod: PBBFAC,,, | Performed by: ANESTHESIOLOGY

## 2023-01-01 PROCEDURE — 1101F PT FALLS ASSESS-DOCD LE1/YR: CPT | Mod: CPTII,S$GLB,, | Performed by: PHYSICIAN ASSISTANT

## 2023-01-01 PROCEDURE — 86140 C-REACTIVE PROTEIN: CPT | Performed by: HOSPITALIST

## 2023-01-01 PROCEDURE — 1125F AMNT PAIN NOTED PAIN PRSNT: CPT | Mod: CPTII,S$GLB,, | Performed by: PHYSICIAN ASSISTANT

## 2023-01-01 PROCEDURE — 71000033 HC RECOVERY, INTIAL HOUR: Performed by: ANESTHESIOLOGY

## 2023-01-01 PROCEDURE — 1160F RVW MEDS BY RX/DR IN RCRD: CPT | Mod: CPTII,95,, | Performed by: NURSE PRACTITIONER

## 2023-01-01 PROCEDURE — 99999 PR PBB SHADOW E&M-EST. PATIENT-LVL V: CPT | Mod: PBBFAC,,, | Performed by: FAMILY MEDICINE

## 2023-01-01 PROCEDURE — 88304 PR  SURG PATH,LEVEL III: ICD-10-PCS | Mod: 26,,, | Performed by: PATHOLOGY

## 2023-01-01 PROCEDURE — 1111F PR DISCHARGE MEDS RECONCILED W/ CURRENT OUTPATIENT MED LIST: ICD-10-PCS | Mod: CPTII,S$GLB,, | Performed by: INTERNAL MEDICINE

## 2023-01-01 PROCEDURE — 1101F PR PT FALLS ASSESS DOC 0-1 FALLS W/OUT INJ PAST YR: ICD-10-PCS | Mod: CPTII,95,, | Performed by: NURSE PRACTITIONER

## 2023-01-01 PROCEDURE — 85610 PROTHROMBIN TIME: CPT | Performed by: RADIOLOGY

## 2023-01-01 PROCEDURE — 63600175 PHARM REV CODE 636 W HCPCS: Performed by: PHYSICIAN ASSISTANT

## 2023-01-01 PROCEDURE — 3074F SYST BP LT 130 MM HG: CPT | Mod: CPTII,S$GLB,, | Performed by: ANESTHESIOLOGY

## 2023-01-01 PROCEDURE — 82550 ASSAY OF CK (CPK): CPT | Performed by: PHYSICIAN ASSISTANT

## 2023-01-01 PROCEDURE — 87040 BLOOD CULTURE FOR BACTERIA: CPT | Mod: 59 | Performed by: EMERGENCY MEDICINE

## 2023-01-01 PROCEDURE — 99214 OFFICE O/P EST MOD 30 MIN: CPT | Mod: ,,, | Performed by: INTERNAL MEDICINE

## 2023-01-01 PROCEDURE — 25000242 PHARM REV CODE 250 ALT 637 W/ HCPCS: Performed by: COLON & RECTAL SURGERY

## 2023-01-01 PROCEDURE — 99291 CRITICAL CARE FIRST HOUR: CPT | Mod: ,,, | Performed by: INTERNAL MEDICINE

## 2023-01-01 PROCEDURE — 99215 PR OFFICE/OUTPT VISIT, EST, LEVL V, 40-54 MIN: ICD-10-PCS | Mod: S$GLB,,, | Performed by: INTERNAL MEDICINE

## 2023-01-01 PROCEDURE — 99223 PR INITIAL HOSPITAL CARE,LEVL III: ICD-10-PCS | Mod: ,,, | Performed by: PHYSICIAN ASSISTANT

## 2023-01-01 PROCEDURE — 99999 PR PBB SHADOW E&M-EST. PATIENT-LVL IV: ICD-10-PCS | Mod: PBBFAC,,, | Performed by: ANESTHESIOLOGY

## 2023-01-01 PROCEDURE — 3288F PR FALLS RISK ASSESSMENT DOCUMENTED: ICD-10-PCS | Mod: CPTII,S$GLB,, | Performed by: PHYSICIAN ASSISTANT

## 2023-01-01 PROCEDURE — 85045 AUTOMATED RETICULOCYTE COUNT: CPT

## 2023-01-01 PROCEDURE — 99204 PR OFFICE/OUTPT VISIT, NEW, LEVL IV, 45-59 MIN: ICD-10-PCS | Mod: S$GLB,,, | Performed by: PHYSICIAN ASSISTANT

## 2023-01-01 PROCEDURE — 25000003 PHARM REV CODE 250: Performed by: PHYSICIAN ASSISTANT

## 2023-01-01 PROCEDURE — 63600175 PHARM REV CODE 636 W HCPCS: Performed by: NURSE ANESTHETIST, CERTIFIED REGISTERED

## 2023-01-01 PROCEDURE — 99215 OFFICE O/P EST HI 40 MIN: CPT | Mod: S$GLB,,,

## 2023-01-01 PROCEDURE — 36415 COLL VENOUS BLD VENIPUNCTURE: CPT | Performed by: COLON & RECTAL SURGERY

## 2023-01-01 PROCEDURE — 25000242 PHARM REV CODE 250 ALT 637 W/ HCPCS: Performed by: INTERNAL MEDICINE

## 2023-01-01 PROCEDURE — 3075F PR MOST RECENT SYSTOLIC BLOOD PRESS GE 130-139MM HG: ICD-10-PCS | Mod: CPTII,S$GLB,, | Performed by: INTERNAL MEDICINE

## 2023-01-01 PROCEDURE — 1159F PR MEDICATION LIST DOCUMENTED IN MEDICAL RECORD: ICD-10-PCS | Mod: CPTII,95,, | Performed by: NURSE PRACTITIONER

## 2023-01-01 PROCEDURE — 81003 URINALYSIS AUTO W/O SCOPE: CPT | Performed by: EMERGENCY MEDICINE

## 2023-01-01 PROCEDURE — 82272 OCCULT BLD FECES 1-3 TESTS: CPT | Performed by: HOSPITALIST

## 2023-01-01 PROCEDURE — 84443 ASSAY THYROID STIM HORMONE: CPT

## 2023-01-01 PROCEDURE — 99214 OFFICE O/P EST MOD 30 MIN: CPT | Mod: S$GLB,,, | Performed by: NURSE PRACTITIONER

## 2023-01-01 PROCEDURE — 93281 PM DEVICE PROGR EVAL MULTI: CPT | Mod: 26,,, | Performed by: NURSE PRACTITIONER

## 2023-01-01 PROCEDURE — 3075F SYST BP GE 130 - 139MM HG: CPT | Mod: CPTII,S$GLB,,

## 2023-01-01 DEVICE — KIT LEAD 70CM: Type: IMPLANTABLE DEVICE | Site: SPINE LUMBAR | Status: FUNCTIONAL

## 2023-01-01 DEVICE — IMPLANTABLE DEVICE: Type: IMPLANTABLE DEVICE | Site: SPINE LUMBAR | Status: FUNCTIONAL

## 2023-01-01 DEVICE — KIT LEAD ANCHOR N3000: Type: IMPLANTABLE DEVICE | Site: SPINE LUMBAR | Status: FUNCTIONAL

## 2023-01-01 RX ORDER — DEXTROSE 40 %
30 GEL (GRAM) ORAL
Status: DISCONTINUED | OUTPATIENT
Start: 2023-01-01 | End: 2023-01-01 | Stop reason: HOSPADM

## 2023-01-01 RX ORDER — BUPIVACAINE HYDROCHLORIDE AND EPINEPHRINE 5; 5 MG/ML; UG/ML
INJECTION, SOLUTION EPIDURAL; INTRACAUDAL; PERINEURAL
Status: DISCONTINUED | OUTPATIENT
Start: 2023-01-01 | End: 2023-01-01 | Stop reason: HOSPADM

## 2023-01-01 RX ORDER — IPRATROPIUM BROMIDE AND ALBUTEROL SULFATE 2.5; .5 MG/3ML; MG/3ML
3 SOLUTION RESPIRATORY (INHALATION) EVERY 4 HOURS PRN
Status: DISCONTINUED | OUTPATIENT
Start: 2023-01-01 | End: 2023-01-01 | Stop reason: HOSPADM

## 2023-01-01 RX ORDER — ACETAMINOPHEN 325 MG/1
650 TABLET ORAL EVERY 4 HOURS PRN
Status: DISCONTINUED | OUTPATIENT
Start: 2023-01-01 | End: 2023-01-01

## 2023-01-01 RX ORDER — KETAMINE HCL IN 0.9 % NACL 50 MG/5 ML
SYRINGE (ML) INTRAVENOUS
Status: DISCONTINUED | OUTPATIENT
Start: 2023-01-01 | End: 2023-01-01

## 2023-01-01 RX ORDER — LIDOCAINE HYDROCHLORIDE 10 MG/ML
INJECTION INFILTRATION; PERINEURAL
Status: DISCONTINUED | OUTPATIENT
Start: 2023-01-01 | End: 2023-01-01 | Stop reason: HOSPADM

## 2023-01-01 RX ORDER — TRAMADOL HYDROCHLORIDE 50 MG/1
25 TABLET ORAL EVERY 8 HOURS PRN
Qty: 14 TABLET | Refills: 0 | Status: SHIPPED | OUTPATIENT
Start: 2023-01-01 | End: 2023-01-01

## 2023-01-01 RX ORDER — ONDANSETRON 2 MG/ML
4 INJECTION INTRAMUSCULAR; INTRAVENOUS DAILY PRN
Status: DISCONTINUED | OUTPATIENT
Start: 2023-01-01 | End: 2023-01-01 | Stop reason: HOSPADM

## 2023-01-01 RX ORDER — AMOXICILLIN 250 MG
1 CAPSULE ORAL 2 TIMES DAILY PRN
Status: DISCONTINUED | OUTPATIENT
Start: 2023-01-01 | End: 2023-01-01 | Stop reason: HOSPADM

## 2023-01-01 RX ORDER — SODIUM CHLORIDE 9 MG/ML
INJECTION, SOLUTION INTRAVENOUS CONTINUOUS
Status: DISCONTINUED | OUTPATIENT
Start: 2023-01-01 | End: 2023-01-01

## 2023-01-01 RX ORDER — MORPHINE SULFATE 4 MG/ML
4 INJECTION, SOLUTION INTRAMUSCULAR; INTRAVENOUS
Status: COMPLETED | OUTPATIENT
Start: 2023-01-01 | End: 2023-01-01

## 2023-01-01 RX ORDER — OXYCODONE AND ACETAMINOPHEN 5; 325 MG/1; MG/1
1 TABLET ORAL
Status: DISCONTINUED | OUTPATIENT
Start: 2023-01-01 | End: 2023-01-01

## 2023-01-01 RX ORDER — PROPOFOL 10 MG/ML
VIAL (ML) INTRAVENOUS
Status: DISCONTINUED | OUTPATIENT
Start: 2023-01-01 | End: 2023-01-01

## 2023-01-01 RX ORDER — HYDRALAZINE HYDROCHLORIDE 20 MG/ML
10 INJECTION INTRAMUSCULAR; INTRAVENOUS EVERY 6 HOURS PRN
Status: DISCONTINUED | OUTPATIENT
Start: 2023-01-01 | End: 2023-01-01 | Stop reason: HOSPADM

## 2023-01-01 RX ORDER — GLUCAGON 1 MG
1 KIT INJECTION
Status: DISCONTINUED | OUTPATIENT
Start: 2023-01-01 | End: 2023-01-01 | Stop reason: HOSPADM

## 2023-01-01 RX ORDER — OXYCODONE AND ACETAMINOPHEN 5; 325 MG/1; MG/1
1 TABLET ORAL EVERY 6 HOURS PRN
Qty: 21 TABLET | Refills: 0 | Status: SHIPPED | OUTPATIENT
Start: 2023-01-01 | End: 2023-01-01

## 2023-01-01 RX ORDER — SUCCINYLCHOLINE CHLORIDE 20 MG/ML
INJECTION INTRAMUSCULAR; INTRAVENOUS
Status: DISCONTINUED | OUTPATIENT
Start: 2023-01-01 | End: 2023-01-01

## 2023-01-01 RX ORDER — ATORVASTATIN CALCIUM 40 MG/1
80 TABLET, FILM COATED ORAL NIGHTLY
Status: DISCONTINUED | OUTPATIENT
Start: 2023-01-01 | End: 2023-01-01 | Stop reason: HOSPADM

## 2023-01-01 RX ORDER — MIDAZOLAM HYDROCHLORIDE 1 MG/ML
INJECTION, SOLUTION INTRAMUSCULAR; INTRAVENOUS
Status: DISCONTINUED | OUTPATIENT
Start: 2023-01-01 | End: 2023-01-01 | Stop reason: HOSPADM

## 2023-01-01 RX ORDER — MORPHINE SULFATE 1 MG/ML
INJECTION INTRAVENOUS CONTINUOUS
Status: DISCONTINUED | OUTPATIENT
Start: 2023-01-01 | End: 2023-01-01

## 2023-01-01 RX ORDER — CLONAZEPAM 1 MG/1
1 TABLET ORAL NIGHTLY PRN
Status: DISCONTINUED | OUTPATIENT
Start: 2023-01-01 | End: 2023-01-01 | Stop reason: HOSPADM

## 2023-01-01 RX ORDER — ONDANSETRON 2 MG/ML
INJECTION INTRAMUSCULAR; INTRAVENOUS
Status: DISCONTINUED | OUTPATIENT
Start: 2023-01-01 | End: 2023-01-01

## 2023-01-01 RX ORDER — VANCOMYCIN HYDROCHLORIDE 1 G/20ML
INJECTION, POWDER, LYOPHILIZED, FOR SOLUTION INTRAVENOUS
Status: DISCONTINUED | OUTPATIENT
Start: 2023-01-01 | End: 2023-01-01 | Stop reason: HOSPADM

## 2023-01-01 RX ORDER — FERROUS SULFATE 325(65) MG
325 TABLET ORAL 2 TIMES DAILY
Qty: 60 TABLET | Refills: 1
Start: 2023-01-01

## 2023-01-01 RX ORDER — ACETAMINOPHEN 325 MG/1
650 TABLET ORAL EVERY 8 HOURS PRN
Status: DISCONTINUED | OUTPATIENT
Start: 2023-01-01 | End: 2023-01-01 | Stop reason: HOSPADM

## 2023-01-01 RX ORDER — PANTOPRAZOLE SODIUM 40 MG/10ML
40 INJECTION, POWDER, LYOPHILIZED, FOR SOLUTION INTRAVENOUS DAILY
Status: DISCONTINUED | OUTPATIENT
Start: 2023-01-01 | End: 2023-01-01 | Stop reason: HOSPADM

## 2023-01-01 RX ORDER — FUROSEMIDE 10 MG/ML
40 INJECTION INTRAMUSCULAR; INTRAVENOUS
Status: DISCONTINUED | OUTPATIENT
Start: 2023-01-01 | End: 2023-01-01

## 2023-01-01 RX ORDER — BUPIVACAINE HYDROCHLORIDE 5 MG/ML
INJECTION, SOLUTION EPIDURAL; INTRACAUDAL
Status: DISCONTINUED | OUTPATIENT
Start: 2023-01-01 | End: 2023-01-01 | Stop reason: HOSPADM

## 2023-01-01 RX ORDER — PANTOPRAZOLE SODIUM 40 MG/1
40 TABLET, DELAYED RELEASE ORAL DAILY
Status: DISCONTINUED | OUTPATIENT
Start: 2023-01-01 | End: 2023-01-01 | Stop reason: HOSPADM

## 2023-01-01 RX ORDER — ATORVASTATIN CALCIUM 80 MG/1
80 TABLET, FILM COATED ORAL NIGHTLY
Qty: 90 TABLET | Refills: 1 | Status: SHIPPED | OUTPATIENT
Start: 2023-01-01

## 2023-01-01 RX ORDER — ONDANSETRON 2 MG/ML
4 INJECTION INTRAMUSCULAR; INTRAVENOUS
Status: COMPLETED | OUTPATIENT
Start: 2023-01-01 | End: 2023-01-01

## 2023-01-01 RX ORDER — LOPERAMIDE HYDROCHLORIDE 2 MG/1
2 CAPSULE ORAL 4 TIMES DAILY PRN
Status: DISCONTINUED | OUTPATIENT
Start: 2023-01-01 | End: 2023-01-01 | Stop reason: HOSPADM

## 2023-01-01 RX ORDER — FUROSEMIDE 20 MG/1
20 TABLET ORAL DAILY
Status: DISCONTINUED | OUTPATIENT
Start: 2023-01-01 | End: 2023-01-01 | Stop reason: HOSPADM

## 2023-01-01 RX ORDER — FUROSEMIDE 20 MG/1
20 TABLET ORAL DAILY
COMMUNITY

## 2023-01-01 RX ORDER — ACETAMINOPHEN 650 MG/1
650 SUPPOSITORY RECTAL EVERY 4 HOURS PRN
Status: DISCONTINUED | OUTPATIENT
Start: 2023-01-01 | End: 2023-01-01

## 2023-01-01 RX ORDER — HYDROCODONE BITARTRATE AND ACETAMINOPHEN 500; 5 MG/1; MG/1
TABLET ORAL
Status: DISCONTINUED | OUTPATIENT
Start: 2023-01-01 | End: 2023-01-01 | Stop reason: HOSPADM

## 2023-01-01 RX ORDER — CEFAZOLIN SODIUM 2 G/50ML
2 SOLUTION INTRAVENOUS
Status: COMPLETED | OUTPATIENT
Start: 2023-01-01 | End: 2023-01-01

## 2023-01-01 RX ORDER — FLUTICASONE PROPIONATE 50 MCG
2 SPRAY, SUSPENSION (ML) NASAL DAILY
Status: DISCONTINUED | OUTPATIENT
Start: 2023-01-01 | End: 2023-01-01 | Stop reason: HOSPADM

## 2023-01-01 RX ORDER — SODIUM CHLORIDE 9 MG/ML
1000 INJECTION, SOLUTION INTRAVENOUS
Status: COMPLETED | OUTPATIENT
Start: 2023-01-01 | End: 2023-01-01

## 2023-01-01 RX ORDER — MORPHINE SULFATE 4 MG/ML
2 INJECTION, SOLUTION INTRAMUSCULAR; INTRAVENOUS EVERY 4 HOURS PRN
Status: DISCONTINUED | OUTPATIENT
Start: 2023-01-01 | End: 2023-01-01 | Stop reason: HOSPADM

## 2023-01-01 RX ORDER — FUROSEMIDE 10 MG/ML
40 INJECTION INTRAMUSCULAR; INTRAVENOUS DAILY
Status: DISCONTINUED | OUTPATIENT
Start: 2023-01-01 | End: 2023-01-01

## 2023-01-01 RX ORDER — PREGABALIN 75 MG/1
75 CAPSULE ORAL 2 TIMES DAILY
Status: DISCONTINUED | OUTPATIENT
Start: 2023-01-01 | End: 2023-01-01 | Stop reason: HOSPADM

## 2023-01-01 RX ORDER — IBUPROFEN 200 MG
16 TABLET ORAL
Status: DISCONTINUED | OUTPATIENT
Start: 2023-01-01 | End: 2023-01-01 | Stop reason: HOSPADM

## 2023-01-01 RX ORDER — ACETAMINOPHEN 325 MG/1
650 TABLET ORAL EVERY 8 HOURS PRN
Status: DISCONTINUED | OUTPATIENT
Start: 2023-01-01 | End: 2023-01-01

## 2023-01-01 RX ORDER — SODIUM CHLORIDE 0.9 % (FLUSH) 0.9 %
3 SYRINGE (ML) INJECTION EVERY 12 HOURS PRN
Status: DISCONTINUED | OUTPATIENT
Start: 2023-01-01 | End: 2023-01-01 | Stop reason: HOSPADM

## 2023-01-01 RX ORDER — PREGABALIN 50 MG/1
50 CAPSULE ORAL DAILY
Qty: 30 CAPSULE | Refills: 0 | Status: SHIPPED | OUTPATIENT
Start: 2023-01-01 | End: 2023-01-01 | Stop reason: DRUGHIGH

## 2023-01-01 RX ORDER — MAG HYDROX/ALUMINUM HYD/SIMETH 200-200-20
30 SUSPENSION, ORAL (FINAL DOSE FORM) ORAL 4 TIMES DAILY PRN
Status: DISCONTINUED | OUTPATIENT
Start: 2023-01-01 | End: 2023-01-01 | Stop reason: HOSPADM

## 2023-01-01 RX ORDER — LOSARTAN POTASSIUM 50 MG/1
50 TABLET ORAL DAILY
Status: DISCONTINUED | OUTPATIENT
Start: 2023-01-01 | End: 2023-01-01

## 2023-01-01 RX ORDER — NALOXONE HCL 0.4 MG/ML
0.02 VIAL (ML) INJECTION
Status: DISCONTINUED | OUTPATIENT
Start: 2023-01-01 | End: 2023-01-01 | Stop reason: HOSPADM

## 2023-01-01 RX ORDER — LIDOCAINE HYDROCHLORIDE 10 MG/ML
INJECTION, SOLUTION EPIDURAL; INFILTRATION; INTRACAUDAL; PERINEURAL
Status: DISCONTINUED | OUTPATIENT
Start: 2023-01-01 | End: 2023-01-01 | Stop reason: HOSPADM

## 2023-01-01 RX ORDER — POTASSIUM CHLORIDE 20 MEQ/1
20 TABLET, EXTENDED RELEASE ORAL DAILY
Status: DISCONTINUED | OUTPATIENT
Start: 2023-01-01 | End: 2023-01-01

## 2023-01-01 RX ORDER — LANOLIN ALCOHOL/MO/W.PET/CERES
1 CREAM (GRAM) TOPICAL 2 TIMES DAILY
Status: DISCONTINUED | OUTPATIENT
Start: 2023-01-01 | End: 2023-01-01 | Stop reason: HOSPADM

## 2023-01-01 RX ORDER — HYDROMORPHONE HYDROCHLORIDE 2 MG/ML
0.2 INJECTION, SOLUTION INTRAMUSCULAR; INTRAVENOUS; SUBCUTANEOUS EVERY 5 MIN PRN
Status: DISCONTINUED | OUTPATIENT
Start: 2023-01-01 | End: 2023-01-01

## 2023-01-01 RX ORDER — FUROSEMIDE 10 MG/ML
40 INJECTION INTRAMUSCULAR; INTRAVENOUS
Status: COMPLETED | OUTPATIENT
Start: 2023-01-01 | End: 2023-01-01

## 2023-01-01 RX ORDER — FUROSEMIDE 40 MG/1
40 TABLET ORAL 2 TIMES DAILY
Status: DISCONTINUED | OUTPATIENT
Start: 2023-01-01 | End: 2023-01-01

## 2023-01-01 RX ORDER — TALC
6 POWDER (GRAM) TOPICAL NIGHTLY PRN
Status: DISCONTINUED | OUTPATIENT
Start: 2023-01-01 | End: 2023-01-01 | Stop reason: HOSPADM

## 2023-01-01 RX ORDER — IBUPROFEN 200 MG
24 TABLET ORAL
Status: DISCONTINUED | OUTPATIENT
Start: 2023-01-01 | End: 2023-01-01 | Stop reason: HOSPADM

## 2023-01-01 RX ORDER — SODIUM BICARBONATE 1 MEQ/ML
SYRINGE (ML) INTRAVENOUS CODE/TRAUMA/SEDATION MEDICATION
Status: COMPLETED | OUTPATIENT
Start: 2023-01-01 | End: 2023-01-01

## 2023-01-01 RX ORDER — ACETAMINOPHEN 650 MG/1
650 SUPPOSITORY RECTAL EVERY 6 HOURS PRN
Status: DISCONTINUED | OUTPATIENT
Start: 2023-01-01 | End: 2023-01-01 | Stop reason: HOSPADM

## 2023-01-01 RX ORDER — METOPROLOL SUCCINATE 25 MG/1
25 TABLET, EXTENDED RELEASE ORAL DAILY
Status: DISCONTINUED | OUTPATIENT
Start: 2023-01-01 | End: 2023-01-01 | Stop reason: HOSPADM

## 2023-01-01 RX ORDER — ONDANSETRON 8 MG/1
8 TABLET, ORALLY DISINTEGRATING ORAL EVERY 8 HOURS PRN
Status: DISCONTINUED | OUTPATIENT
Start: 2023-01-01 | End: 2023-01-01

## 2023-01-01 RX ORDER — SODIUM CHLORIDE 0.9 % (FLUSH) 0.9 %
10 SYRINGE (ML) INJECTION
Status: DISCONTINUED | OUTPATIENT
Start: 2023-01-01 | End: 2023-01-01 | Stop reason: HOSPADM

## 2023-01-01 RX ORDER — NOREPINEPHRINE BITARTRATE/D5W 4MG/250ML
PLASTIC BAG, INJECTION (ML) INTRAVENOUS
Status: DISCONTINUED
Start: 2023-01-01 | End: 2023-01-01 | Stop reason: HOSPADM

## 2023-01-01 RX ORDER — TORSEMIDE 20 MG/1
20 TABLET ORAL DAILY
Qty: 30 TABLET | Refills: 11 | Status: SHIPPED | OUTPATIENT
Start: 2023-01-01 | End: 2023-01-01

## 2023-01-01 RX ORDER — DEXTROSE MONOHYDRATE AND SODIUM CHLORIDE 5; .45 G/100ML; G/100ML
INJECTION, SOLUTION INTRAVENOUS CONTINUOUS
Status: DISCONTINUED | OUTPATIENT
Start: 2023-01-01 | End: 2023-01-01

## 2023-01-01 RX ORDER — POTASSIUM CHLORIDE 20 MEQ/1
40 TABLET, EXTENDED RELEASE ORAL ONCE
Status: COMPLETED | OUTPATIENT
Start: 2023-01-01 | End: 2023-01-01

## 2023-01-01 RX ORDER — INSULIN ASPART 100 [IU]/ML
0-5 INJECTION, SOLUTION INTRAVENOUS; SUBCUTANEOUS
Status: DISCONTINUED | OUTPATIENT
Start: 2023-01-01 | End: 2023-01-01 | Stop reason: HOSPADM

## 2023-01-01 RX ORDER — FUROSEMIDE 20 MG/1
20 TABLET ORAL DAILY
Qty: 30 TABLET | Refills: 11 | Status: ON HOLD | OUTPATIENT
Start: 2023-01-01 | End: 2023-01-01 | Stop reason: HOSPADM

## 2023-01-01 RX ORDER — PREGABALIN 75 MG/1
CAPSULE ORAL
Qty: 60 CAPSULE | Refills: 1 | Status: SHIPPED | OUTPATIENT
Start: 2023-01-01 | End: 2023-01-01

## 2023-01-01 RX ORDER — SPIRONOLACTONE 25 MG/1
25 TABLET ORAL DAILY
Status: DISCONTINUED | OUTPATIENT
Start: 2023-01-01 | End: 2023-01-01

## 2023-01-01 RX ORDER — FUROSEMIDE 10 MG/ML
20 INJECTION INTRAMUSCULAR; INTRAVENOUS ONCE
Status: COMPLETED | OUTPATIENT
Start: 2023-01-01 | End: 2023-01-01

## 2023-01-01 RX ORDER — EPINEPHRINE 0.1 MG/ML
INJECTION INTRAVENOUS CODE/TRAUMA/SEDATION MEDICATION
Status: COMPLETED | OUTPATIENT
Start: 2023-01-01 | End: 2023-01-01

## 2023-01-01 RX ORDER — ONDANSETRON 2 MG/ML
4 INJECTION INTRAMUSCULAR; INTRAVENOUS EVERY 8 HOURS PRN
Status: DISCONTINUED | OUTPATIENT
Start: 2023-01-01 | End: 2023-01-01 | Stop reason: HOSPADM

## 2023-01-01 RX ORDER — PREGABALIN 75 MG/1
CAPSULE ORAL
Qty: 60 CAPSULE | Refills: 1 | Status: ON HOLD | OUTPATIENT
Start: 2023-01-01 | End: 2023-01-01 | Stop reason: SDUPTHER

## 2023-01-01 RX ORDER — MORPHINE SULFATE 2 MG/ML
2 INJECTION, SOLUTION INTRAMUSCULAR; INTRAVENOUS EVERY 4 HOURS PRN
Status: DISCONTINUED | OUTPATIENT
Start: 2023-01-01 | End: 2023-01-01 | Stop reason: HOSPADM

## 2023-01-01 RX ORDER — MICONAZOLE NITRATE 2 %
POWDER (GRAM) TOPICAL 2 TIMES DAILY
Status: DISCONTINUED | OUTPATIENT
Start: 2023-01-01 | End: 2023-01-01 | Stop reason: HOSPADM

## 2023-01-01 RX ORDER — HYDROCODONE BITARTRATE AND ACETAMINOPHEN 5; 325 MG/1; MG/1
1 TABLET ORAL EVERY 4 HOURS PRN
Status: DISCONTINUED | OUTPATIENT
Start: 2023-01-01 | End: 2023-01-01

## 2023-01-01 RX ORDER — FENTANYL CITRATE 50 UG/ML
INJECTION, SOLUTION INTRAMUSCULAR; INTRAVENOUS
Status: DISCONTINUED | OUTPATIENT
Start: 2023-01-01 | End: 2023-01-01 | Stop reason: HOSPADM

## 2023-01-01 RX ORDER — DEXTROSE 40 %
15 GEL (GRAM) ORAL
Status: DISCONTINUED | OUTPATIENT
Start: 2023-01-01 | End: 2023-01-01 | Stop reason: HOSPADM

## 2023-01-01 RX ORDER — GUAIFENESIN 100 MG/5ML
200 SOLUTION ORAL EVERY 4 HOURS PRN
Status: DISCONTINUED | OUTPATIENT
Start: 2023-01-01 | End: 2023-01-01 | Stop reason: HOSPADM

## 2023-01-01 RX ORDER — AMIODARONE HYDROCHLORIDE 150 MG/3ML
INJECTION, SOLUTION INTRAVENOUS CODE/TRAUMA/SEDATION MEDICATION
Status: COMPLETED | OUTPATIENT
Start: 2023-01-01 | End: 2023-01-01

## 2023-01-01 RX ORDER — HYDROCODONE BITARTRATE AND ACETAMINOPHEN 5; 325 MG/1; MG/1
1 TABLET ORAL EVERY 6 HOURS PRN
Status: DISCONTINUED | OUTPATIENT
Start: 2023-01-01 | End: 2023-01-01 | Stop reason: HOSPADM

## 2023-01-01 RX ORDER — CEFAZOLIN SODIUM 1 G/3ML
INJECTION, POWDER, FOR SOLUTION INTRAMUSCULAR; INTRAVENOUS
Status: DISCONTINUED | OUTPATIENT
Start: 2023-01-01 | End: 2023-01-01 | Stop reason: HOSPADM

## 2023-01-01 RX ORDER — MUPIROCIN 20 MG/G
OINTMENT TOPICAL 2 TIMES DAILY
Status: DISPENSED | OUTPATIENT
Start: 2023-01-01 | End: 2023-01-01

## 2023-01-01 RX ORDER — CETIRIZINE HYDROCHLORIDE 5 MG/1
5 TABLET ORAL NIGHTLY
Status: DISCONTINUED | OUTPATIENT
Start: 2023-01-01 | End: 2023-01-01 | Stop reason: HOSPADM

## 2023-01-01 RX ORDER — ONDANSETRON 2 MG/ML
4 INJECTION INTRAMUSCULAR; INTRAVENOUS DAILY PRN
Status: DISCONTINUED | OUTPATIENT
Start: 2023-01-01 | End: 2023-01-01

## 2023-01-01 RX ORDER — DOXYCYCLINE 100 MG/1
100 CAPSULE ORAL EVERY 12 HOURS
Qty: 6 CAPSULE | Refills: 0 | Status: SHIPPED | OUTPATIENT
Start: 2023-01-01 | End: 2023-01-01

## 2023-01-01 RX ORDER — FUROSEMIDE 10 MG/ML
40 INJECTION INTRAMUSCULAR; INTRAVENOUS DAILY
Status: DISCONTINUED | OUTPATIENT
Start: 2023-01-01 | End: 2023-01-01 | Stop reason: HOSPADM

## 2023-01-01 RX ORDER — CEPHALEXIN 500 MG/1
500 CAPSULE ORAL EVERY 8 HOURS
Qty: 9 CAPSULE | Refills: 0 | Status: SHIPPED | OUTPATIENT
Start: 2023-01-01 | End: 2023-01-01

## 2023-01-01 RX ORDER — TORSEMIDE 20 MG/1
20 TABLET ORAL 2 TIMES DAILY
Qty: 60 TABLET | Refills: 11 | Status: SHIPPED | OUTPATIENT
Start: 2023-01-01 | End: 2024-06-14

## 2023-01-01 RX ORDER — MORPHINE SULFATE 4 MG/ML
2 INJECTION, SOLUTION INTRAMUSCULAR; INTRAVENOUS EVERY 4 HOURS PRN
Status: DISCONTINUED | OUTPATIENT
Start: 2023-01-01 | End: 2023-01-01

## 2023-01-01 RX ORDER — HEPARIN SODIUM 5000 [USP'U]/ML
5000 INJECTION, SOLUTION INTRAVENOUS; SUBCUTANEOUS EVERY 8 HOURS
Status: DISCONTINUED | OUTPATIENT
Start: 2023-01-01 | End: 2023-01-01 | Stop reason: HOSPADM

## 2023-01-01 RX ORDER — FUROSEMIDE 40 MG/1
40 TABLET ORAL 2 TIMES DAILY
Qty: 90 TABLET | Refills: 1 | Status: ON HOLD | OUTPATIENT
Start: 2023-01-01 | End: 2023-01-01 | Stop reason: HOSPADM

## 2023-01-01 RX ORDER — PROMETHAZINE HYDROCHLORIDE 25 MG/1
25 TABLET ORAL EVERY 6 HOURS PRN
Status: DISCONTINUED | OUTPATIENT
Start: 2023-01-01 | End: 2023-01-01 | Stop reason: HOSPADM

## 2023-01-01 RX ORDER — PREDNISONE 20 MG/1
20 TABLET ORAL DAILY
Qty: 5 TABLET | Refills: 0 | Status: SHIPPED | OUTPATIENT
Start: 2023-01-01 | End: 2023-01-01

## 2023-01-01 RX ORDER — PROCHLORPERAZINE EDISYLATE 5 MG/ML
5 INJECTION INTRAMUSCULAR; INTRAVENOUS EVERY 6 HOURS PRN
Status: DISCONTINUED | OUTPATIENT
Start: 2023-01-01 | End: 2023-01-01 | Stop reason: HOSPADM

## 2023-01-01 RX ORDER — LOSARTAN POTASSIUM 50 MG/1
50 TABLET ORAL DAILY
Status: DISCONTINUED | OUTPATIENT
Start: 2023-01-01 | End: 2023-01-01 | Stop reason: HOSPADM

## 2023-01-01 RX ORDER — MIDAZOLAM HYDROCHLORIDE 1 MG/ML
INJECTION INTRAMUSCULAR; INTRAVENOUS
Status: DISCONTINUED | OUTPATIENT
Start: 2023-01-01 | End: 2023-01-01

## 2023-01-01 RX ORDER — SIMETHICONE 80 MG
1 TABLET,CHEWABLE ORAL 4 TIMES DAILY PRN
Status: DISCONTINUED | OUTPATIENT
Start: 2023-01-01 | End: 2023-01-01 | Stop reason: HOSPADM

## 2023-01-01 RX ORDER — FENTANYL CITRATE 50 UG/ML
INJECTION, SOLUTION INTRAMUSCULAR; INTRAVENOUS
Status: DISCONTINUED | OUTPATIENT
Start: 2023-01-01 | End: 2023-01-01

## 2023-01-01 RX ORDER — CEFDINIR 300 MG/1
300 CAPSULE ORAL 2 TIMES DAILY
Qty: 14 CAPSULE | Refills: 0 | Status: SHIPPED | OUTPATIENT
Start: 2023-01-01 | End: 2023-01-01

## 2023-01-01 RX ORDER — DEXAMETHASONE SODIUM PHOSPHATE 4 MG/ML
INJECTION, SOLUTION INTRA-ARTICULAR; INTRALESIONAL; INTRAMUSCULAR; INTRAVENOUS; SOFT TISSUE
Status: DISCONTINUED | OUTPATIENT
Start: 2023-01-01 | End: 2023-01-01

## 2023-01-01 RX ORDER — POLYETHYLENE GLYCOL 3350 17 G/17G
17 POWDER, FOR SOLUTION ORAL DAILY PRN
Status: DISCONTINUED | OUTPATIENT
Start: 2023-01-01 | End: 2023-01-01 | Stop reason: HOSPADM

## 2023-01-01 RX ORDER — OXYCODONE AND ACETAMINOPHEN 5; 325 MG/1; MG/1
1 TABLET ORAL
Status: DISCONTINUED | OUTPATIENT
Start: 2023-01-01 | End: 2023-01-01 | Stop reason: HOSPADM

## 2023-01-01 RX ORDER — BUPIVACAINE HYDROCHLORIDE 2.5 MG/ML
INJECTION, SOLUTION EPIDURAL; INFILTRATION; INTRACAUDAL
Status: DISCONTINUED | OUTPATIENT
Start: 2023-01-01 | End: 2023-01-01 | Stop reason: HOSPADM

## 2023-01-01 RX ORDER — TRAMADOL HYDROCHLORIDE 50 MG/1
50 TABLET ORAL ONCE
Status: COMPLETED | OUTPATIENT
Start: 2023-01-01 | End: 2023-01-01

## 2023-01-01 RX ORDER — POTASSIUM CHLORIDE 7.45 MG/ML
10 INJECTION INTRAVENOUS ONCE
Status: COMPLETED | OUTPATIENT
Start: 2023-01-01 | End: 2023-01-01

## 2023-01-01 RX ORDER — INDOMETHACIN 25 MG/1
CAPSULE ORAL
Status: DISCONTINUED | OUTPATIENT
Start: 2023-01-01 | End: 2023-01-01 | Stop reason: HOSPADM

## 2023-01-01 RX ORDER — ROCURONIUM BROMIDE 10 MG/ML
INJECTION, SOLUTION INTRAVENOUS
Status: DISCONTINUED | OUTPATIENT
Start: 2023-01-01 | End: 2023-01-01

## 2023-01-01 RX ORDER — MAGNESIUM SULFATE HEPTAHYDRATE 40 MG/ML
2 INJECTION, SOLUTION INTRAVENOUS ONCE
Status: COMPLETED | OUTPATIENT
Start: 2023-01-01 | End: 2023-01-01

## 2023-01-01 RX ORDER — BACITRACIN ZINC 500 UNIT/G
OINTMENT (GRAM) TOPICAL
Status: DISCONTINUED | OUTPATIENT
Start: 2023-01-01 | End: 2023-01-01 | Stop reason: HOSPADM

## 2023-01-01 RX ORDER — FENTANYL CITRATE 50 UG/ML
25 INJECTION, SOLUTION INTRAMUSCULAR; INTRAVENOUS EVERY 5 MIN PRN
Status: DISCONTINUED | OUTPATIENT
Start: 2023-01-01 | End: 2023-01-01 | Stop reason: HOSPADM

## 2023-01-01 RX ORDER — POTASSIUM CHLORIDE 7.45 MG/ML
10 INJECTION INTRAVENOUS
Status: COMPLETED | OUTPATIENT
Start: 2023-01-01 | End: 2023-01-01

## 2023-01-01 RX ORDER — POTASSIUM CHLORIDE 20 MEQ/1
20 TABLET, EXTENDED RELEASE ORAL DAILY
COMMUNITY
Start: 2023-01-01

## 2023-01-01 RX ORDER — ACETAMINOPHEN 650 MG/1
650 SUPPOSITORY RECTAL EVERY 4 HOURS PRN
Status: DISCONTINUED | OUTPATIENT
Start: 2023-01-01 | End: 2023-01-01 | Stop reason: HOSPADM

## 2023-01-01 RX ORDER — LIDOCAINE HYDROCHLORIDE 20 MG/ML
INJECTION INTRAVENOUS
Status: DISCONTINUED | OUTPATIENT
Start: 2023-01-01 | End: 2023-01-01

## 2023-01-01 RX ORDER — FUROSEMIDE 20 MG/1
20 TABLET ORAL DAILY
Status: DISCONTINUED | OUTPATIENT
Start: 2023-01-01 | End: 2023-01-01

## 2023-01-01 RX ADMIN — HYDROCODONE BITARTRATE AND ACETAMINOPHEN 1 TABLET: 5; 325 TABLET ORAL at 07:05

## 2023-01-01 RX ADMIN — PANTOPRAZOLE SODIUM 40 MG: 40 TABLET, DELAYED RELEASE ORAL at 08:05

## 2023-01-01 RX ADMIN — PROPOFOL 20 MG: 10 INJECTION, EMULSION INTRAVENOUS at 11:03

## 2023-01-01 RX ADMIN — POTASSIUM CHLORIDE 10 MEQ: 7.46 INJECTION, SOLUTION INTRAVENOUS at 11:06

## 2023-01-01 RX ADMIN — IRON SUCROSE 200 MG: 20 INJECTION, SOLUTION INTRAVENOUS at 10:05

## 2023-01-01 RX ADMIN — PROPOFOL 50 MG: 10 INJECTION, EMULSION INTRAVENOUS at 12:03

## 2023-01-01 RX ADMIN — FENTANYL CITRATE 25 MCG: 50 INJECTION, SOLUTION INTRAMUSCULAR; INTRAVENOUS at 11:03

## 2023-01-01 RX ADMIN — MAGNESIUM SULFATE 2 G: 2 INJECTION INTRAVENOUS at 03:06

## 2023-01-01 RX ADMIN — FLUTICASONE PROPIONATE 100 MCG: 50 SPRAY, METERED NASAL at 08:06

## 2023-01-01 RX ADMIN — PIPERACILLIN SODIUM AND TAZOBACTAM SODIUM 4.5 G: 4; .5 INJECTION, POWDER, FOR SOLUTION INTRAVENOUS at 03:06

## 2023-01-01 RX ADMIN — ROCURONIUM BROMIDE 50 MG: 10 INJECTION, SOLUTION INTRAVENOUS at 08:06

## 2023-01-01 RX ADMIN — APIXABAN 2.5 MG: 2.5 TABLET, FILM COATED ORAL at 09:06

## 2023-01-01 RX ADMIN — MICONAZOLE NITRATE: 20 POWDER TOPICAL at 09:06

## 2023-01-01 RX ADMIN — APIXABAN 2.5 MG: 2.5 TABLET, FILM COATED ORAL at 08:06

## 2023-01-01 RX ADMIN — FLUTICASONE PROPIONATE 100 MCG: 50 SPRAY, METERED NASAL at 09:06

## 2023-01-01 RX ADMIN — PIPERACILLIN SODIUM AND TAZOBACTAM SODIUM 4.5 G: 4; .5 INJECTION, POWDER, FOR SOLUTION INTRAVENOUS at 09:06

## 2023-01-01 RX ADMIN — PANTOPRAZOLE SODIUM 40 MG: 40 INJECTION, POWDER, FOR SOLUTION INTRAVENOUS at 08:06

## 2023-01-01 RX ADMIN — PIPERACILLIN SODIUM AND TAZOBACTAM SODIUM 4.5 G: 4; .5 INJECTION, POWDER, FOR SOLUTION INTRAVENOUS at 01:06

## 2023-01-01 RX ADMIN — ACETAMINOPHEN 650 MG: 325 TABLET ORAL at 05:06

## 2023-01-01 RX ADMIN — FENTANYL CITRATE 25 MCG: 50 INJECTION, SOLUTION INTRAMUSCULAR; INTRAVENOUS at 10:06

## 2023-01-01 RX ADMIN — EPOETIN ALFA-EPBX 40000 UNITS: 40000 INJECTION, SOLUTION INTRAVENOUS; SUBCUTANEOUS at 10:02

## 2023-01-01 RX ADMIN — FERROUS SULFATE TAB 325 MG (65 MG ELEMENTAL FE) 1 EACH: 325 (65 FE) TAB at 08:06

## 2023-01-01 RX ADMIN — ATORVASTATIN CALCIUM 80 MG: 40 TABLET, FILM COATED ORAL at 08:05

## 2023-01-01 RX ADMIN — MUPIROCIN: 20 OINTMENT TOPICAL at 12:06

## 2023-01-01 RX ADMIN — AMIODARONE HYDROCHLORIDE 150 MG: 50 INJECTION, SOLUTION INTRAVENOUS at 05:06

## 2023-01-01 RX ADMIN — APIXABAN 2.5 MG: 2.5 TABLET, FILM COATED ORAL at 08:05

## 2023-01-01 RX ADMIN — PROPOFOL 20 MG: 10 INJECTION, EMULSION INTRAVENOUS at 12:03

## 2023-01-01 RX ADMIN — LOSARTAN POTASSIUM 50 MG: 50 TABLET, FILM COATED ORAL at 09:06

## 2023-01-01 RX ADMIN — HYDROCODONE BITARTRATE AND ACETAMINOPHEN 1 TABLET: 5; 325 TABLET ORAL at 08:05

## 2023-01-01 RX ADMIN — CLONAZEPAM 1 MG: 1 TABLET ORAL at 08:05

## 2023-01-01 RX ADMIN — FERROUS SULFATE TAB 325 MG (65 MG ELEMENTAL FE) 1 EACH: 325 (65 FE) TAB at 09:06

## 2023-01-01 RX ADMIN — PANTOPRAZOLE SODIUM 40 MG: 40 TABLET, DELAYED RELEASE ORAL at 09:06

## 2023-01-01 RX ADMIN — FUROSEMIDE 20 MG: 20 TABLET ORAL at 03:05

## 2023-01-01 RX ADMIN — EPINEPHRINE 1 MG: 0.1 INJECTION INTRACARDIAC; INTRAVENOUS at 04:06

## 2023-01-01 RX ADMIN — HEPARIN SODIUM 5000 UNITS: 5000 INJECTION INTRAVENOUS; SUBCUTANEOUS at 06:06

## 2023-01-01 RX ADMIN — ATORVASTATIN CALCIUM 80 MG: 40 TABLET, FILM COATED ORAL at 09:06

## 2023-01-01 RX ADMIN — HYDROCODONE BITARTRATE AND ACETAMINOPHEN 1 TABLET: 5; 325 TABLET ORAL at 09:05

## 2023-01-01 RX ADMIN — MICONAZOLE NITRATE: 20 POWDER TOPICAL at 08:06

## 2023-01-01 RX ADMIN — PREGABALIN 75 MG: 75 CAPSULE ORAL at 08:05

## 2023-01-01 RX ADMIN — MUPIROCIN: 20 OINTMENT TOPICAL at 09:06

## 2023-01-01 RX ADMIN — AMIODARONE HYDROCHLORIDE 300 MG: 50 INJECTION, SOLUTION INTRAVENOUS at 04:06

## 2023-01-01 RX ADMIN — ROCURONIUM BROMIDE 30 MG: 10 INJECTION, SOLUTION INTRAVENOUS at 08:06

## 2023-01-01 RX ADMIN — IPRATROPIUM BROMIDE AND ALBUTEROL SULFATE 3 ML: .5; 3 SOLUTION RESPIRATORY (INHALATION) at 02:06

## 2023-01-01 RX ADMIN — POTASSIUM BICARBONATE 20 MEQ: 391 TABLET, EFFERVESCENT ORAL at 03:05

## 2023-01-01 RX ADMIN — SODIUM CHLORIDE: 9 INJECTION, SOLUTION INTRAVENOUS at 05:05

## 2023-01-01 RX ADMIN — APIXABAN 2.5 MG: 2.5 TABLET, FILM COATED ORAL at 09:05

## 2023-01-01 RX ADMIN — MORPHINE SULFATE 2 MG: 2 INJECTION, SOLUTION INTRAMUSCULAR; INTRAVENOUS at 08:06

## 2023-01-01 RX ADMIN — FUROSEMIDE 40 MG: 10 INJECTION, SOLUTION INTRAMUSCULAR; INTRAVENOUS at 09:06

## 2023-01-01 RX ADMIN — SODIUM BICARBONATE 50 MEQ: 84 INJECTION INTRAVENOUS at 04:06

## 2023-01-01 RX ADMIN — PIPERACILLIN SODIUM AND TAZOBACTAM SODIUM 4.5 G: 4; .5 INJECTION, POWDER, FOR SOLUTION INTRAVENOUS at 05:06

## 2023-01-01 RX ADMIN — ACETAMINOPHEN 650 MG: 325 TABLET ORAL at 10:06

## 2023-01-01 RX ADMIN — POTASSIUM CHLORIDE 20 MEQ: 1500 TABLET, EXTENDED RELEASE ORAL at 09:05

## 2023-01-01 RX ADMIN — EPOETIN ALFA-EPBX 40000 UNITS: 40000 INJECTION, SOLUTION INTRAVENOUS; SUBCUTANEOUS at 03:04

## 2023-01-01 RX ADMIN — HYDROMORPHONE HYDROCHLORIDE 0.2 MG: 2 INJECTION INTRAMUSCULAR; INTRAVENOUS; SUBCUTANEOUS at 11:06

## 2023-01-01 RX ADMIN — FUROSEMIDE 20 MG: 10 INJECTION, SOLUTION INTRAMUSCULAR; INTRAVENOUS at 11:06

## 2023-01-01 RX ADMIN — PROCHLORPERAZINE EDISYLATE 5 MG: 5 INJECTION INTRAMUSCULAR; INTRAVENOUS at 05:06

## 2023-01-01 RX ADMIN — PIPERACILLIN SODIUM AND TAZOBACTAM SODIUM 4.5 G: 4; .5 INJECTION, POWDER, FOR SOLUTION INTRAVENOUS at 02:06

## 2023-01-01 RX ADMIN — ACETAMINOPHEN 650 MG: 325 TABLET ORAL at 09:06

## 2023-01-01 RX ADMIN — POTASSIUM CHLORIDE 20 MEQ: 1500 TABLET, EXTENDED RELEASE ORAL at 09:06

## 2023-01-01 RX ADMIN — PANTOPRAZOLE SODIUM 40 MG: 40 INJECTION, POWDER, FOR SOLUTION INTRAVENOUS at 09:06

## 2023-01-01 RX ADMIN — HEPARIN SODIUM 5000 UNITS: 5000 INJECTION INTRAVENOUS; SUBCUTANEOUS at 05:06

## 2023-01-01 RX ADMIN — PANTOPRAZOLE SODIUM 40 MG: 40 TABLET, DELAYED RELEASE ORAL at 10:05

## 2023-01-01 RX ADMIN — EPINEPHRINE 1 MG: 0.1 INJECTION INTRACARDIAC; INTRAVENOUS at 05:06

## 2023-01-01 RX ADMIN — HEPARIN SODIUM 5000 UNITS: 5000 INJECTION INTRAVENOUS; SUBCUTANEOUS at 10:06

## 2023-01-01 RX ADMIN — PREGABALIN 75 MG: 75 CAPSULE ORAL at 09:06

## 2023-01-01 RX ADMIN — GUAIFENESIN 200 MG: 200 SOLUTION ORAL at 05:06

## 2023-01-01 RX ADMIN — POTASSIUM CHLORIDE 20 MEQ: 1500 TABLET, EXTENDED RELEASE ORAL at 08:05

## 2023-01-01 RX ADMIN — Medication 6 MG: at 10:06

## 2023-01-01 RX ADMIN — CETIRIZINE HYDROCHLORIDE 5 MG: 5 TABLET ORAL at 08:06

## 2023-01-01 RX ADMIN — FUROSEMIDE 40 MG: 10 INJECTION, SOLUTION INTRAMUSCULAR; INTRAVENOUS at 08:06

## 2023-01-01 RX ADMIN — ONDANSETRON 4 MG: 2 INJECTION INTRAMUSCULAR; INTRAVENOUS at 07:06

## 2023-01-01 RX ADMIN — GUAIFENESIN 200 MG: 200 SOLUTION ORAL at 02:06

## 2023-01-01 RX ADMIN — PROPOFOL 50 MG: 10 INJECTION, EMULSION INTRAVENOUS at 01:03

## 2023-01-01 RX ADMIN — Medication 10 MG: at 11:03

## 2023-01-01 RX ADMIN — FUROSEMIDE 40 MG: 40 TABLET ORAL at 08:05

## 2023-01-01 RX ADMIN — PREGABALIN 75 MG: 75 CAPSULE ORAL at 09:05

## 2023-01-01 RX ADMIN — MUPIROCIN: 20 OINTMENT TOPICAL at 08:06

## 2023-01-01 RX ADMIN — MICONAZOLE NITRATE: 20 POWDER TOPICAL at 10:06

## 2023-01-01 RX ADMIN — EPOETIN ALFA-EPBX 40000 UNITS: 40000 INJECTION, SOLUTION INTRAVENOUS; SUBCUTANEOUS at 04:01

## 2023-01-01 RX ADMIN — DEXTROSE AND SODIUM CHLORIDE: 5; 450 INJECTION, SOLUTION INTRAVENOUS at 09:06

## 2023-01-01 RX ADMIN — POTASSIUM CHLORIDE 10 MEQ: 7.46 INJECTION, SOLUTION INTRAVENOUS at 01:06

## 2023-01-01 RX ADMIN — SODIUM CHLORIDE: 9 INJECTION, SOLUTION INTRAVENOUS at 04:06

## 2023-01-01 RX ADMIN — FUROSEMIDE 20 MG: 20 TABLET ORAL at 10:05

## 2023-01-01 RX ADMIN — PIPERACILLIN SODIUM AND TAZOBACTAM SODIUM 4.5 G: 4; .5 INJECTION, POWDER, FOR SOLUTION INTRAVENOUS at 06:06

## 2023-01-01 RX ADMIN — FLUTICASONE PROPIONATE 100 MCG: 50 SPRAY, METERED NASAL at 03:06

## 2023-01-01 RX ADMIN — POTASSIUM CHLORIDE 10 MEQ: 7.46 INJECTION, SOLUTION INTRAVENOUS at 02:06

## 2023-01-01 RX ADMIN — MORPHINE SULFATE 4 MG: 4 INJECTION INTRAVENOUS at 11:06

## 2023-01-01 RX ADMIN — Medication 10 MG: at 12:03

## 2023-01-01 RX ADMIN — PREGABALIN 75 MG: 75 CAPSULE ORAL at 10:05

## 2023-01-01 RX ADMIN — CEFAZOLIN SODIUM 2 G: 2 SOLUTION INTRAVENOUS at 10:01

## 2023-01-01 RX ADMIN — HEPARIN SODIUM 5000 UNITS: 5000 INJECTION INTRAVENOUS; SUBCUTANEOUS at 09:06

## 2023-01-01 RX ADMIN — POTASSIUM CHLORIDE 40 MEQ: 1500 TABLET, EXTENDED RELEASE ORAL at 12:06

## 2023-01-01 RX ADMIN — SODIUM CHLORIDE, POTASSIUM CHLORIDE, SODIUM LACTATE AND CALCIUM CHLORIDE: 600; 310; 30; 20 INJECTION, SOLUTION INTRAVENOUS at 07:06

## 2023-01-01 RX ADMIN — DEXAMETHASONE SODIUM PHOSPHATE 4 MG: 4 INJECTION, SOLUTION INTRA-ARTICULAR; INTRALESIONAL; INTRAMUSCULAR; INTRAVENOUS; SOFT TISSUE at 11:03

## 2023-01-01 RX ADMIN — ATORVASTATIN CALCIUM 80 MG: 40 TABLET, FILM COATED ORAL at 08:06

## 2023-01-01 RX ADMIN — LOSARTAN POTASSIUM 50 MG: 50 TABLET, FILM COATED ORAL at 08:06

## 2023-01-01 RX ADMIN — METOPROLOL SUCCINATE 25 MG: 25 TABLET, EXTENDED RELEASE ORAL at 03:06

## 2023-01-01 RX ADMIN — EPOETIN ALFA-EPBX 5000 UNITS: 10000 INJECTION, SOLUTION INTRAVENOUS; SUBCUTANEOUS at 11:05

## 2023-01-01 RX ADMIN — LOPERAMIDE HYDROCHLORIDE 2 MG: 2 CAPSULE ORAL at 10:05

## 2023-01-01 RX ADMIN — MORPHINE SULFATE: 1 INJECTION INTRAVENOUS at 01:06

## 2023-01-01 RX ADMIN — PREGABALIN 75 MG: 75 CAPSULE ORAL at 08:06

## 2023-01-01 RX ADMIN — PANTOPRAZOLE SODIUM 40 MG: 40 INJECTION, POWDER, FOR SOLUTION INTRAVENOUS at 10:06

## 2023-01-01 RX ADMIN — HYDRALAZINE HYDROCHLORIDE 10 MG: 20 INJECTION, SOLUTION INTRAMUSCULAR; INTRAVENOUS at 05:06

## 2023-01-01 RX ADMIN — PROCHLORPERAZINE EDISYLATE 5 MG: 5 INJECTION INTRAMUSCULAR; INTRAVENOUS at 04:06

## 2023-01-01 RX ADMIN — HYDROMORPHONE HYDROCHLORIDE 0.2 MG: 2 INJECTION INTRAMUSCULAR; INTRAVENOUS; SUBCUTANEOUS at 10:06

## 2023-01-01 RX ADMIN — PANTOPRAZOLE SODIUM 40 MG: 40 TABLET, DELAYED RELEASE ORAL at 09:05

## 2023-01-01 RX ADMIN — SODIUM CHLORIDE 250 ML: 9 INJECTION, SOLUTION INTRAVENOUS at 08:05

## 2023-01-01 RX ADMIN — SUCCINYLCHOLINE CHLORIDE 120 MG: 20 INJECTION, SOLUTION INTRAMUSCULAR; INTRAVENOUS; PARENTERAL at 08:06

## 2023-01-01 RX ADMIN — SUGAMMADEX 200 MG: 100 INJECTION, SOLUTION INTRAVENOUS at 10:06

## 2023-01-01 RX ADMIN — ONDANSETRON 4 MG: 2 INJECTION INTRAMUSCULAR; INTRAVENOUS at 12:03

## 2023-01-01 RX ADMIN — POTASSIUM CHLORIDE 10 MEQ: 7.46 INJECTION, SOLUTION INTRAVENOUS at 12:06

## 2023-01-01 RX ADMIN — HYDROCODONE BITARTRATE AND ACETAMINOPHEN 1 TABLET: 5; 325 TABLET ORAL at 10:05

## 2023-01-01 RX ADMIN — ACETAMINOPHEN 650 MG: 325 TABLET ORAL at 08:05

## 2023-01-01 RX ADMIN — FUROSEMIDE 40 MG: 10 INJECTION, SOLUTION INTRAMUSCULAR; INTRAVENOUS at 03:05

## 2023-01-01 RX ADMIN — CEFAZOLIN 2 G: 330 INJECTION, POWDER, FOR SOLUTION INTRAMUSCULAR; INTRAVENOUS at 11:03

## 2023-01-01 RX ADMIN — MIDAZOLAM HYDROCHLORIDE 2 MG: 1 INJECTION INTRAMUSCULAR; INTRAVENOUS at 11:03

## 2023-01-01 RX ADMIN — MORPHINE SULFATE: 1 INJECTION INTRAVENOUS at 02:06

## 2023-01-01 RX ADMIN — HEPARIN SODIUM 5000 UNITS: 5000 INJECTION INTRAVENOUS; SUBCUTANEOUS at 02:06

## 2023-01-01 RX ADMIN — GUAIFENESIN 200 MG: 200 SOLUTION ORAL at 10:06

## 2023-01-01 RX ADMIN — OXYCODONE HYDROCHLORIDE AND ACETAMINOPHEN 1 TABLET: 5; 325 TABLET ORAL at 02:03

## 2023-01-01 RX ADMIN — SODIUM BICARBONATE 50 MEQ: 84 INJECTION INTRAVENOUS at 05:06

## 2023-01-01 RX ADMIN — HEPARIN SODIUM 5000 UNITS: 5000 INJECTION INTRAVENOUS; SUBCUTANEOUS at 01:06

## 2023-01-01 RX ADMIN — EPOETIN ALFA-EPBX 40000 UNITS: 40000 INJECTION, SOLUTION INTRAVENOUS; SUBCUTANEOUS at 03:03

## 2023-01-01 RX ADMIN — MICONAZOLE NITRATE: 20 POWDER TOPICAL at 12:06

## 2023-01-01 RX ADMIN — TRAMADOL HYDROCHLORIDE 50 MG: 50 TABLET, COATED ORAL at 06:06

## 2023-01-01 RX ADMIN — FUROSEMIDE 40 MG: 10 INJECTION, SOLUTION INTRAMUSCULAR; INTRAVENOUS at 07:06

## 2023-01-01 RX ADMIN — IRON SUCROSE 200 MG: 20 INJECTION, SOLUTION INTRAVENOUS at 09:05

## 2023-01-01 RX ADMIN — PANTOPRAZOLE SODIUM 40 MG: 40 TABLET, DELAYED RELEASE ORAL at 08:06

## 2023-01-01 RX ADMIN — PROCHLORPERAZINE EDISYLATE 5 MG: 5 INJECTION INTRAMUSCULAR; INTRAVENOUS at 11:06

## 2023-01-01 RX ADMIN — HYDRALAZINE HYDROCHLORIDE 10 MG: 20 INJECTION, SOLUTION INTRAMUSCULAR; INTRAVENOUS at 04:06

## 2023-01-01 RX ADMIN — SODIUM CHLORIDE, SODIUM LACTATE, POTASSIUM CHLORIDE, AND CALCIUM CHLORIDE: .6; .31; .03; .02 INJECTION, SOLUTION INTRAVENOUS at 11:03

## 2023-01-01 RX ADMIN — CEFTRIAXONE 1 G: 1 INJECTION, POWDER, FOR SOLUTION INTRAMUSCULAR; INTRAVENOUS at 05:05

## 2023-01-01 RX ADMIN — IPRATROPIUM BROMIDE AND ALBUTEROL SULFATE 3 ML: .5; 3 SOLUTION RESPIRATORY (INHALATION) at 09:06

## 2023-01-01 RX ADMIN — LIDOCAINE HYDROCHLORIDE 100 MG: 20 INJECTION INTRAVENOUS at 08:06

## 2023-01-01 RX ADMIN — SODIUM CHLORIDE: 9 INJECTION, SOLUTION INTRAVENOUS at 03:06

## 2023-01-01 RX ADMIN — ACETAMINOPHEN 650 MG: 325 TABLET ORAL at 02:06

## 2023-01-01 RX ADMIN — CETIRIZINE HYDROCHLORIDE 5 MG: 5 TABLET ORAL at 09:06

## 2023-01-01 RX ADMIN — PROPOFOL 100 MG: 10 INJECTION, EMULSION INTRAVENOUS at 08:06

## 2023-01-01 RX ADMIN — MORPHINE SULFATE 2 MG: 2 INJECTION, SOLUTION INTRAMUSCULAR; INTRAVENOUS at 07:06

## 2023-01-01 RX ADMIN — EPOETIN ALFA-EPBX 40000 UNITS: 40000 INJECTION, SOLUTION INTRAVENOUS; SUBCUTANEOUS at 01:05

## 2023-01-01 RX ADMIN — SODIUM CHLORIDE 1000 ML: 9 INJECTION, SOLUTION INTRAVENOUS at 09:06

## 2023-01-01 RX ADMIN — MORPHINE SULFATE 4 MG: 4 INJECTION INTRAVENOUS at 08:06

## 2023-01-01 RX ADMIN — Medication 20 MG: at 11:03

## 2023-01-01 RX ADMIN — MUPIROCIN: 20 OINTMENT TOPICAL at 10:06

## 2023-01-23 NOTE — PRE-PROCEDURE INSTRUCTIONS
Spoke with patient regarding procedure scheduled on 1.25    Arrival time 1030    Has patient been sick with fever or on antibiotics within the last 7 days? No    Does the patient have any open wounds, sores or rashes? No    Does the patient have any recent fractures? no    Has patient received a vaccination within the last 7 days? No    Received the COVID vaccination? yes    Has the patient stopped all medications as directed? Hold eliquis 3 days prior to procedure. Cardiac clearance obtained from dr rich on 1.11.     Does patient have a pacemaker and or defibrillator? pacemaker    Does the patient have a ride to and from procedure and someone reliable to remain with patient? WIFE    Is the patient diabetic? es    Does the patient have sleep apnea? Or use O2 at home? Zach on cpap    Is the patient receiving sedation? yes    Is the patient instructed to remain NPO beginning at midnight the night before their procedure? yes    Procedure location confirmed with patient? Yes    Covid- Denies signs/symptoms. Instructed to notify PAT/MD if any changes.

## 2023-01-25 PROBLEM — M96.1 FAILED BACK SURGICAL SYNDROME: Status: ACTIVE | Noted: 2023-01-01

## 2023-01-25 PROBLEM — G57.70 CAUSALGIA OF LOWER EXTREMITY: Status: ACTIVE | Noted: 2023-01-01

## 2023-01-25 NOTE — OP NOTE
[facility] Spinal cord stimulator trial     Preoperative diagnosis: Failed back surgical syndrome [M96.1]  Chronic pain syndrome [G89.4]    01/25/2023    Postoperative diagnosis: Failed back surgical syndrome [M96.1]  Chronic pain syndrome [G89.4]     Surgeon: Philipp Demarco MD      EBL: Minimal     Complications: None     Specimens: None     Procedure   1) placement of octad electrode by 2   2) intraoperative and postoperative programming, simple   3) spinal cord stimulator trial           Description of procedure:   The patient was placed in the prone position on the OR table, the area overlying the lumbar spine was prepped and draped in usual sterile fashion using chlorhexidine. After an appropriate timeout the position over the entry site for the lumbar spine was identified at the T12/L1 interspace and an entry point over the L2 interspace was marked and anesthetized using a 27-gauge needle followed by a spinal needle to the lamina. A total of 20 mL of a mixture of 1% lidocaine and 0.5% bupivacaine was used. This was followed by advancement of a 2 14-gauge  needles into the epidural space at L1/2 Using fluoroscopy in the AP and lateral view for guidance of the needle and loss-of-resistance to air to enter the epidural space. Once this was entered 2 octad electrodes were advanced under live fluoroscopy to the T8-T10 interlaminar space. This was followed by removal of the needles and stylets with securing the leads to the skin using Dermabond and Steri-Strips. The areas were covered with Tegaderm dressings.  The patient was taken to recovery room in stable condition where postoperative programming was performed.     The patient tolerated the procedure well     Conscious sedation provided by M.D   The patient was monitored with continuous pulse oximetry, EKG, and intermittent blood pressure monitors. The patient was hemodynamically stable throughout the entire process was responsive to voice, and breathing  spontaneously. Supplemental O2 was provided at 2L/min via nasal cannula. Patient was comfortable for the duration of the procedure.   There was a total of 1mg IV Midazolam and 50 mcg Fentanyl was given for the procedure  (See nurse documentation and case log for sedation time)

## 2023-01-25 NOTE — DISCHARGE SUMMARY
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  Qty: 90 tablet, Refills: 1      furosemide (LASIX) 40 MG tablet Take 1 tablet (40 mg total) by mouth 2 (two) times daily. Can double to 2 tablets twice a day for 3 days for more swelling/fluid build up - take 80mg twice a day  Qty: 90 tablet, Refills: 1    Associated Diagnoses: Paroxysmal atrial fibrillation; Essential hypertension      losartan (COZAAR) 50 MG tablet Take 1 tablet (50 mg total) by mouth once daily.  Qty: 90 tablet, Refills: 3    Comments: .  Associated Diagnoses: Essential hypertension; Permanent atrial fibrillation; Mixed hyperlipidemia; Coronary artery disease of native artery of native heart with stable angina pectoris      pantoprazole (PROTONIX) 40 MG tablet Take 1 tablet (40 mg total) by mouth once daily.  Qty: 90 tablet, Refills: 3      pregabalin (LYRICA) 50 MG capsule Take 1 capsule (50 mg total) by mouth once daily.  Qty: 30 capsule, Refills: 0      spironolactone (ALDACTONE) 25 MG tablet Take 1 tablet (25 mg total) by mouth once daily.  Qty: 90 tablet, Refills: 3    Comments: .      acetaminophen (TYLENOL) 500 MG tablet Take 2 tablets (1,000 mg total) by mouth every 6 (six) hours as needed for Pain.  Refills: 0      apixaban (ELIQUIS) 2.5 mg Tab Take 1 tablet (2.5 mg total) by mouth 2 (two) times daily.  Qty: 180 tablet, Refills: 1    Associated Diagnoses: PAF (paroxysmal atrial  fibrillation)      blood sugar diagnostic Strp Check blood glucose levels daily in the AM fasting and 1-2 times more daily.  Qty: 300 strip, Refills: 3    Associated Diagnoses: Diabetes mellitus type 2 in obese      cefadroxil (DURICEF) 500 MG Cap Take 1 capsule (500 mg total) by mouth every 12 (twelve) hours.  Qty: 6 capsule, Refills: 0    Comments: Take 2 caps tonight @ 8 pm then 1 caps every 12 hrs until done.      cholecalciferol, vitamin D3, (VITAMIN D3) 25 mcg (1,000 unit) capsule Take 1,000 Units by mouth once daily.      clonazePAM (KLONOPIN) 1 MG tablet Take 1 tablet (1 mg total) by mouth nightly as needed for Anxiety.  Qty: 90 tablet, Refills: 0    Associated Diagnoses: Periodic limb movement disorder (PLMD)      fluticasone propionate (FLONASE) 50 mcg/actuation nasal spray USE 2 SPRAYS IN EACH NOSTRIL EVERY DAY  Qty: 48 g, Refills: 5    Associated Diagnoses: Allergic rhinitis, unspecified seasonality, unspecified trigger      krill/om-3/dha/epa/phospho/ast (MEGARED OMEGA-3 KRILL OIL ORAL) Take 1 capsule by mouth every morning.      lancets Misc Check blood glucose levels daily in the AM fasting and 1-2 times more daily.  Qty: 300 each, Refills: 3    Associated Diagnoses: Diabetes mellitus type 2 in obese      levocetirizine (XYZAL) 5 MG tablet Take 1 tablet (5 mg total) by mouth every evening.  Qty: 90 tablet, Refills: 1    Associated Diagnoses: Allergic rhinitis, unspecified seasonality, unspecified trigger      magnesium oxide (MAG-OX) 400 mg (241.3 mg magnesium) tablet Take 1 tablet (400 mg total) by mouth once daily.  Qty: 90 tablet, Refills: 1    Associated Diagnoses: Hypomagnesemia      multivitamin capsule Take 1 capsule by mouth once daily.      potassium chloride SA (K-DUR,KLOR-CON) 20 MEQ tablet Take 1 tablet (20 mEq total) by mouth once daily.  Qty: 90 tablet, Refills: 1      pyridoxine, vitamin B6, (B-6) 100 MG Tab Take 50 mg by mouth once daily.      vit A/vit C/vit E/zinc/copper (OCUVITE  PRESERVISION ORAL) Take 1 capsule by mouth every evening.                 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  Activity: as per instruction sheet.  Discharge to: Home with a responsible adult.  Follow up: 2-4 weeks       Please call the office at [phone] if you experience any weakness or loss of sensation, fever > 101.5, pain uncontrolled with oral medications, persistent nausea/vomiting/or diarrhea, redness or drainage from the incisions, or any other worrisome concerns. If physician on call was not reached or could not communicate with our office for any reason please go to the nearest emergency department

## 2023-01-25 NOTE — DISCHARGE INSTRUCTIONS
Neuro Stimulator Trial Discharge Instructions:      Wound care:    Leave dressing in place for duration of stimulator trial, if sutures are in place leave steri-strips alone until follow up. If steri strips fall off prior to clinic it is okay.    Leave battery taped down during the length of your trial, if your tape comes loose please re-tape down in a similar fashion. Do not pull or tug at the wires.    No soaking in a bath or swimming until after follow up. Sponge bath to front is ok.      Care Tips:    Take your pain medication as needed.     Take over the counter stool softener while taking pain medication to prevent constipation.     If no bowel movement in 2 days take miralax twice a day.    Ok for light activity (light housework, walking, stair climbing) no strenuous exercise until or physical therapy until after follow up.     No lifting greater than 10 pounds until after follow up.    Please keep abdominal binder (if ordered) on during the day time and with activity, but remove before bedtime or at rest.     Trial typically last 5-7 days, please do your best to adhere to these guidelines.     When to call the Doctor:    Please call my office if experienced any weakness or loss of sensation, fever > 101.5, pain uncontrolled with oral medications, persistent nausea/vomiting/or diarrhea, redness or drainage from the incisions, or any other worrisome concerns. If physician on call was not reached or could not communicate with our office for any reason please go to the nearest emergency department.

## 2023-01-31 NOTE — PROGRESS NOTES
"Subjective:      Patient ID: Jake Ortiz is a 82 y.o. male.    Chief Complaint: anemia of CKD      HPI:  Mr. Ortiz is a pleasant 82-year-old male who presents today for follow-up of anemia due to CKD. He has been receiving epo 40kU weekly as needed for hgb<10g/dL. He has needed blood transfusions in the past. Colonoscopy in 2020 showed internal hemorrhoids and small polyps- recommendation to repeat in 5 years. EGD in 2020 showed some gastritis. He then had VCE in 2021 which was normal. Pt seen by GI 2022 for positive occult stool samples. GI suspected positive occult blood due to internal hemorrhoids and referred to surgery, however, Dr. Moody did not think hemorrhoids are large contributing factor to his anemia and recommended repeating upper and lower endoscopy, but pt not agreeable to this at this time.    Interval History: Pt states overall he continues to feel "pretty good." He says since his pacemaker replacement he continues to feel well. He continues with back pain even with stimulator placement--he notes follow-up with pain management next week. Denies n/v/d/c, lightheadedness, dizziness, hematuria, melena, hematochezia.      Social History     Socioeconomic History    Marital status:     Number of children: 0   Occupational History    Occupation: retired     Employer: [employer]   Tobacco Use    Smoking status: Former     Packs/day: 1.50     Years: 20.00     Pack years: 30.00     Types: Cigarettes     Start date:      Quit date:      Years since quittin.1    Smokeless tobacco: Never   Substance and Sexual Activity    Alcohol use: No    Drug use: No    Sexual activity: Not Currently     Social Determinants of Health     Financial Resource Strain: Low Risk     Difficulty of Paying Living Expenses: Not hard at all   Food Insecurity: No Food Insecurity    Worried About Running Out of Food in the Last Year: Never true    Ran Out of Food in the Last Year: Never true "   Transportation Needs: No Transportation Needs    Lack of Transportation (Medical): No    Lack of Transportation (Non-Medical): No   Physical Activity: Inactive    Days of Exercise per Week: 0 days    Minutes of Exercise per Session: 0 min   Stress: No Stress Concern Present    Feeling of Stress : Not at all   Social Connections: Socially Integrated    Frequency of Communication with Friends and Family: More than three times a week    Frequency of Social Gatherings with Friends and Family: More than three times a week    Attends Congregational Services: More than 4 times per year    Active Member of Clubs or Organizations: Yes    Attends Club or Organization Meetings: More than 4 times per year    Marital Status:    Housing Stability: Low Risk     Unable to Pay for Housing in the Last Year: No    Number of Places Lived in the Last Year: 1    Unstable Housing in the Last Year: No       Family History   Problem Relation Age of Onset    Heart attack Mother     Diabetes Mother     Heart disease Mother     Cataracts Mother     Stroke Father     Heart disease Father     Heart disease Brother        Past Surgical History:   Procedure Laterality Date    COLONOSCOPY N/A 9/22/2020    Procedure: COLONOSCOPY;  Surgeon: Javier Farmer MD;  Location: [facility] ENDO;  Service: Endoscopy;  Laterality: N/A;    CORONARY ARTERY BYPASS GRAFT  1987    EPIDURAL STEROID INJECTION N/A 11/22/2019    Procedure: Lumbar L5/S1 IL XUAN;  Surgeon: Tacho Trivedi MD;  Location: [facility] PAIN MGT;  Service: Pain Management;  Laterality: N/A;    EPIDURAL STEROID INJECTION N/A 1/6/2020    Procedure: Lumbar L5/S1 IL XUAN;  Surgeon: Tacho Trivedi MD;  Location: [facility] PAIN MGT;  Service: Pain Management;  Laterality: N/A;    EPIDURAL STEROID INJECTION N/A 2/10/2020    Procedure: Caudal XUAN;  Surgeon: Tacho Trivedi MD;  Location: [facility] PAIN MGT;  Service: Pain Management;  Laterality: N/A;    ESOPHAGOGASTRODUODENOSCOPY N/A 9/22/2020     Procedure: EGD (ESOPHAGOGASTRODUODENOSCOPY);  Surgeon: Javier Farmer MD;  Location: [facility] ENDO;  Service: Endoscopy;  Laterality: N/A;    INJECTION OF ANESTHETIC AGENT AROUND MEDIAL BRANCH NERVES INNERVATING LUMBAR FACET JOINT Bilateral 10/12/2020    Procedure: Bilateral L3-5 MBB;  Surgeon: Tacho Trivedi MD;  Location: [facility] PAIN MGT;  Service: Pain Management;  Laterality: Bilateral;    INJECTION OF ANESTHETIC AGENT AROUND MEDIAL BRANCH NERVES INNERVATING LUMBAR FACET JOINT Bilateral 12/21/2020    Procedure: Bilateral L3-5 MBB with steroid;  Surgeon: Tacho Trivedi MD;  Location: [facility] PAIN MGT;  Service: Pain Management;  Laterality: Bilateral;    INTRALUMINAL GASTROINTESTINAL TRACT IMAGING VIA CAPSULE N/A 12/29/2021    Procedure: IMAGING PROCEDURE, GI TRACT, INTRALUMINAL, VIA CAPSULE;  Surgeon: Tahir Geronimo RN;  Location: [facility] ENDO;  Service: Endoscopy;  Laterality: N/A;    PROSTATE SURGERY      prostate radiation    RADIOFREQUENCY THERMOCOAGULATION Left 6/4/2021    Procedure: Left L3-5 Lumbar RFA;  Surgeon: Tacho Trivedi MD;  Location: [facility] PAIN MGT;  Service: Pain Management;  Laterality: Left;    RADIOFREQUENCY THERMOCOAGULATION Right 6/18/2021    Procedure: Right L3-5 Lumbar RFA;  Surgeon: Tacho Trivedi MD;  Location: [facility] PAIN MGT;  Service: Pain Management;  Laterality: Right;    REPLACEMENT OF PACEMAKER GENERATOR Left 6/16/2022    Procedure: REPLACEMENT, PULSE GENERATOR, CARDIAC PACEMAKER/CRT-P device;  Surgeon: Darrel Carrero MD;  Location: [facility] CATH LAB;  Service: Cardiology;  Laterality: Left;  NOT MRI safe   Pacer and leads implanted 9/30/2017, Dr. Souleymane CARROLLT Consulta CRT-P C4TR01, [id number]   A lead: Mdt 5056 CapSureFix® Novus, [id number]   RV lead: Mdt 5076 CapSureFix® Novus, [id number]   LV lead: Jovan 4196 At    SELECTIVE INJECTION OF ANESTHETIC AGENT AROUND LUMBAR SPINAL NERVE ROOT BY TRANSFORAMINAL APPROACH Bilateral 6/8/2022    Procedure: Bilateral L3/4 TF XUAN  with RN IV sedation;  Surgeon: Philipp Demarco MD;  Location: [facility] PAIN T;  Service: Pain Management;  Laterality: Bilateral;    SPINE SURGERY      fusion    TONSILLECTOMY      TRIAL OF SPINAL CORD NERVE STIMULATOR N/A 1/25/2023    Procedure: Trial, Neurostimulator, Spinal Cord with RN IV sedation;  Surgeon: Philipp Demarco MD;  Location: [facility] PAIN MGT;  Service: Pain Management;  Laterality: N/A;       Past Medical History:   Diagnosis Date    Abnormal PFT     Anemia     Arthritis     Atrial fibrillation 10/19/2017    Back pain     Congestive heart failure 03/05/2018    Coronary artery disease     DM (diabetes mellitus) 2018     am 06/23/2020    DM (diabetes mellitus) 2016     am 08/17/2021    Hyperlipemia     Hypertension     Myocardial infarction     NASREEN (obstructive sleep apnea) 06/05/2018    Prostate cancer 2015    Tobacco dependence     Type 2 diabetes mellitus        Review of Systems   Constitutional:  Negative for activity change, appetite change, chills, diaphoresis, fatigue, fever and unexpected weight change.   HENT:  Negative for congestion, nosebleeds and rhinorrhea.    Respiratory:  Negative for cough and shortness of breath.    Cardiovascular:  Negative for chest pain and leg swelling.   Gastrointestinal:  Negative for abdominal pain, blood in stool, constipation, diarrhea, nausea and vomiting.   Genitourinary:  Negative for hematuria.   Musculoskeletal:  Positive for arthralgias and back pain. Negative for gait problem and myalgias.   Skin:  Negative for color change, pallor and rash.   Neurological:  Negative for dizziness, weakness, light-headedness, numbness and headaches.     Medication List with Changes/Refills   Current Medications    ACETAMINOPHEN (TYLENOL) 500 MG TABLET    Take 2 tablets (1,000 mg total) by mouth every 6 (six) hours as needed for Pain.    ATORVASTATIN (LIPITOR) 80 MG TABLET    Take 1 tablet (80 mg total) by mouth every evening.    BLOOD SUGAR DIAGNOSTIC STRP     Check blood glucose levels daily in the AM fasting and 1-2 times more daily.    CEFADROXIL (DURICEF) 500 MG CAP    Take 1 capsule (500 mg total) by mouth every 12 (twelve) hours.    CHOLECALCIFEROL, VITAMIN D3, (VITAMIN D3) 25 MCG (1,000 UNIT) CAPSULE    Take 1,000 Units by mouth once daily.    CLONAZEPAM (KLONOPIN) 1 MG TABLET    Take 1 tablet (1 mg total) by mouth nightly as needed for Anxiety.    FLUTICASONE PROPIONATE (FLONASE) 50 MCG/ACTUATION NASAL SPRAY    USE 2 SPRAYS IN EACH NOSTRIL EVERY DAY    KRILL/OM-3/DHA/EPA/PHOSPHO/AST (MEGARED OMEGA-3 KRILL OIL ORAL)    Take 1 capsule by mouth every morning.    LANCETS MISC    Check blood glucose levels daily in the AM fasting and 1-2 times more daily.    LEVOCETIRIZINE (XYZAL) 5 MG TABLET    Take 1 tablet (5 mg total) by mouth every evening.    LOSARTAN (COZAAR) 50 MG TABLET    Take 1 tablet (50 mg total) by mouth once daily.    MAGNESIUM OXIDE (MAG-OX) 400 MG (241.3 MG MAGNESIUM) TABLET    Take 1 tablet (400 mg total) by mouth once daily.    MULTIVITAMIN CAPSULE    Take 1 capsule by mouth once daily.    POTASSIUM CHLORIDE SA (K-DUR,KLOR-CON) 20 MEQ TABLET    Take 1 tablet (20 mEq total) by mouth once daily.    PREGABALIN (LYRICA) 50 MG CAPSULE    Take 1 capsule (50 mg total) by mouth once daily.    PYRIDOXINE, VITAMIN B6, (B-6) 100 MG TAB    Take 50 mg by mouth once daily.    VIT A/VIT C/VIT E/ZINC/COPPER (OCUVITE PRESERVISION ORAL)    Take 1 capsule by mouth every evening.   Changed and/or Refilled Medications    Modified Medication Previous Medication    APIXABAN (ELIQUIS) 2.5 MG TAB apixaban (ELIQUIS) 2.5 mg Tab       Take 1 tablet (2.5 mg total) by mouth 2 (two) times daily.    Take 1 tablet (2.5 mg total) by mouth 2 (two) times daily.    FUROSEMIDE (LASIX) 40 MG TABLET furosemide (LASIX) 40 MG tablet       Take 1 tablet (40 mg total) by mouth 2 (two) times daily. Can double to 2 tablets twice a day for 3 days for more swelling/fluid build up - take 80mg twice  a day    Take 1 tablet (40 mg total) by mouth 2 (two) times daily. Can double to 2 tablets twice a day for 3 days for more swelling/fluid build up - take 80mg twice a day    PANTOPRAZOLE (PROTONIX) 40 MG TABLET pantoprazole (PROTONIX) 40 MG tablet       Take 1 tablet (40 mg total) by mouth once daily.    Take 1 tablet (40 mg total) by mouth once daily.    SPIRONOLACTONE (ALDACTONE) 25 MG TABLET spironolactone (ALDACTONE) 25 MG tablet       Take 1 tablet (25 mg total) by mouth once daily.    Take 1 tablet (25 mg total) by mouth once daily.        Objective:     Vitals:    01/31/23 1519   BP: 134/70   Pulse: 75   Resp: 18   Temp: 97.3 °F (36.3 °C)       Physical Exam  Vitals reviewed.   Constitutional:       General: He is not in acute distress.     Appearance: He is not ill-appearing, toxic-appearing or diaphoretic.   Eyes:      Conjunctiva/sclera: Conjunctivae normal.   Cardiovascular:      Rate and Rhythm: Normal rate.   Neurological:      Mental Status: He is alert.   Psychiatric:         Mood and Affect: Mood normal.         Behavior: Behavior normal.         Thought Content: Thought content normal.         Judgment: Judgment normal.     Lab Results   Component Value Date    WBC 4.99 01/27/2023    HGB 9.3 (L) 01/27/2023    HCT 30.0 (L) 01/27/2023     (H) 01/27/2023     01/27/2023       Lab Results   Component Value Date     01/27/2023    K 4.6 01/27/2023     01/27/2023    CO2 23 01/27/2023    BUN 38 (H) 01/27/2023    CREATININE 2.1 (H) 01/27/2023    CALCIUM 9.4 01/27/2023    ANIONGAP 14 01/27/2023    ESTGFRAFRICA 50 (A) 07/14/2022    EGFRNONAA 43 (A) 07/14/2022     Lab Results   Component Value Date    ALT 10 01/27/2023    AST 22 01/27/2023    GGT 58 (H) 08/08/2018    ALKPHOS 76 01/27/2023    BILITOT 0.5 01/27/2023       Assessment/Plan:     Problem List Items Addressed This Visit          Oncology    Iron deficiency anemia due to chronic blood loss     Lab Results   Component Value Date     IRON 91 12/22/2022    TRANSFERRIN 190 (L) 12/22/2022    TIBC 281 12/22/2022    FESATURATED 32 12/22/2022        FERRITIN                                                      160    S/p  Injectafer x 2 doses 09/15 & 09/22  Iron studies WNL  Continue on oral supplementation  Plan to reevaluate q 3 months to assess need for additional  IV iron therapy         Relevant Orders    CBC Auto Differential    Comprehensive Metabolic Panel    Ferritin    Iron and TIBC    TSH    Protein Electrophoresis, Serum    Immunofixation Electrophoresis    Immunoglobulin Free LT Chains Blood    Reticulocytes    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 - Primary     Lab Results   Component Value Date    HGB 9.3 (L) 01/27/2023   Proceed with Retacrit today     Follow-up in 4 weeks with cbc & cmp for possible Retacrit              Relevant Orders    CBC Auto Differential    Comprehensive Metabolic Panel    Ferritin    Iron and TIBC    TSH    Protein Electrophoresis, Serum    Immunofixation Electrophoresis    Immunoglobulin Free LT Chains Blood    Reticulocytes    Elevated MCV     Lab Results   Component Value Date    (H) 01/27/2023     Elevated MCV  Most recent B12/folate levels WNL  Plan to continue further evaluation         Relevant Orders    CBC Auto Differential    Comprehensive Metabolic Panel    Ferritin    Iron and TIBC    TSH    Protein Electrophoresis, Serum    Immunofixation Electrophoresis    Immunoglobulin Free LT Chains Blood    Reticulocytes    Homocysteine, Serum    METHYLMALONIC ACID, SERUM     Other Visit Diagnoses       Other specified hypothyroidism        Relevant Orders    TSH          Med Onc Chart Routing      Follow up with physician    Follow up with KAYLIN 1 month. with repeat labs for possible retacrit   Infusion scheduling note    Injection scheduling note    Labs CBC, ferritin, iron and TIBC, SPEP, free light chains, other and TSH   Lab interval:     Imaging    Pharmacy appointment    Other referrals          KEMAL Manzo, FNP-C  Hematology/Oncology

## 2023-02-01 NOTE — PROGRESS NOTES
Established Patient Chronic Pain Note     Referring Physician: No ref. provider found    PCP: Byron Stevens MD    Chief Complaint:   LBP     SUBJECTIVE:  Interval Hx: 2/1/23  Patient presents status post spinal cord stimulator trial 01/25/2023 postop day 7.  Patient reports initial 100% relief in lower back pain at the beginning of the trial.  Patient reports progressing through programs #1 through #4, with reduction in pain relief.  Patient reports 100% relief on program 3.  Patient's wife in the room reports she believes he overall appeared to be in less pain, standing and ambulating more.  Patient reports he noticed almost immediate improvement in posture, no pain with sitting, which is typically when his pain is the most severe.  He does report some paresthesias in the left leg which were not present before the trial.  Patient denies any significant lower extremity weakness, bowel or bladder incontinence.            Interval Hx: 12/29/22  Presents for five-month follow-up.  Today he reports overall he has been doing well after pacemaker surgery with Dr. Carrero, but continues to report significant lower back pain.  Today pain is rated a 6/10.  Patient has been taking 3-6 extra-strength Tylenol daily with marginal improvement in pain, takes the edge off.   To reiterate, patient has undergone multiple epidural steroid injections, medial branch blocks, radiofrequency ablation, physical therapy and failed treatment with anti-inflammatory and membrane stabilizing agents without significant relief.  Patient would like to pursue spinal cord stimulator trial.    Interval History (7/7/2022): Jake Ortiz presents today for follow-up visit.  he underwent Bilateral L3/4 TF XUAN on 06/08/2022.  The patient reports that he is/was unchanged following the procedure.  he reports 0% pain relief. Discussed SCS trial at previous visit, patient has read over brochures and spoken with friends/family that have an SCS and is  interested in pursuing this procedure. Recently underwent pacemaker replacement on 06/16/2022.  Patient reports pain as 7/10 today. Reports continued low back pain without radiation. Reports last night he took 3 extra strength tylenol which temporarily mitigated his pain. Patient has undergone several injections including ESIs with varying levels and approaches, MBBs, RFAs, all with limited relief.      Interval history 05/10/2022  Patient presents for six-month follow-up.  He continues to report lower back pain at the level of L3-4 which radiates anteriorly.  Patient reports pain is exacerbated with prolonged standing and with ambulation.  Patient is interested in pursuing intervention.  Today patient denies significant lower extremity radiculopathy.  Again patient reiterates no significant relief following prior lumbar medial branch blocks or radiofrequency ablation.  Patient has read spinal cord stimulation literature and would like to continue with transforaminal injection at this time.    Interval history 11/17/2020  Patient presents for CT lumbar spine review.  Today patient reports pain has been relatively well controlled.  Patient reports he was dancing earlier today.  When pain is severe patient reports pain in the lower back which radiates down the right lower extremity along the lateral distribution to the calf.  Patient denies significant weakness in the lower extremities or new onset bowel or bladder incontinence or gait ataxia.  Patient reviewed spinal cord stimulation educational material and would like to pursue pharmacologic management prior to trialing this.    HPI:  09/22/2021  Jake Ortiz is a 82 y.o. male with past medical history significant for chronic restrictive lung disease, lumbar spondylosis with radiculopathy status post L2-5 laminectomy (complicated by staph infection; [facility], 10 years prior), hypertension, hyperlipidemia, coronary artery disease status post myocardial  infarctions status post triple-vessel CABG, atrial fibrillation, congestive heart failure status post percutaneous pacemaker placement, stage 4 chronic kidney disease, prostate adenocarcinoma, type 2 diabetes, GERD, obstructive sleep apnea, multi joint arthralgia who presents to the clinic for the evaluation of lower back and leg pain.  Patient reports longstanding history of lower back pain which is constant with radiation down the right lower extremity in L4-5 distribution to the calf.  Patient denies any radicular symptoms on the left.  Patient denies any significant weakness in bilateral lower extremities.  Pain is described as aching in nature and is a 7/10 today.  Pain at its worst is 10/10.  Pain is exacerbated with prolonged standing and with ambulation as well as with crossing his legs.  Patient is able to ambulate approximately 100 ft before requiring rest.  Pain is improved with lumbar flexion, stretching.  Patient has received multiple prior interventions including medial branch block, radiofrequency ablation, epidural and caudal epidural steroid injection without meaningful relief.    Of note patient last saw Dr. Trivedi December 16, 2020 with recommendation for medial branch block and consideration of radiofrequency ablation.    Patient denies night fever/night sweats, urinary incontinence, bowel incontinence, significant weight loss, significant motor weakness and loss of sensations.    Pain Disability Index Review:     Last 3 PDI Scores 9/6/2022 7/6/2022 5/10/2022   Pain Disability Index (PDI) 49 15 42       Non-Pharmacologic Treatments:  Physical Therapy/Home Exercise: yes  Ice/Heat:yes  TENS: no  Acupuncture: no  Massage: no  Chiropractic: no    Other: no      Pain Medications:  - Adjuvant Medications: Clonazepam (Klonopin) and Tylenol (Acetaminophen)  - Anti-Coagulants: Aspirin, Plavix ( Clopidogrel) and Eliquis    Pain Procedures:   Surgeries:  Lumbar surgery with Dr. Powesr with complications with  staph infection causing 2 subsequent surgeries  Injections:  -June 4, 2021:  Left L3-5 lumbar radiofrequency ablation  -December 21, 2020:  Bilateral L3-5 medial branch block with steroid  - series of 3 injections (what sounds like ESIs) about 30 years ago with relief  - Lumbar Medial Branch Blocks and Lumbar Radiofrequency Ablation with limited relief  - L5/S1 IL XUAN on 11/22/19 with some pain relief for a few days  - L5/S1 IL XUAN on 1/6/2020 with limited pain relief for a few days  - caudal XUAN on 2/10/20 with 15% pain relief x 2 days  -10/12/2020 bilateral L3-5 medial branch blocks, 50% relief    Past Medical History:   Diagnosis Date    Abnormal PFT     Anemia     Arthritis     Atrial fibrillation 10/19/2017    Back pain     Congestive heart failure 03/05/2018    Coronary artery disease     DM (diabetes mellitus) 2018     am 06/23/2020    DM (diabetes mellitus) 2016     am 08/17/2021    Hyperlipemia     Hypertension     Myocardial infarction     NASREEN (obstructive sleep apnea) 06/05/2018    Prostate cancer 2015    Tobacco dependence     Type 2 diabetes mellitus      Past Surgical History:   Procedure Laterality Date    COLONOSCOPY N/A 9/22/2020    Procedure: COLONOSCOPY;  Surgeon: Javier Farmer MD;  Location: [facility];  Service: Endoscopy;  Laterality: N/A;    CORONARY ARTERY BYPASS GRAFT  1987    EPIDURAL STEROID INJECTION N/A 11/22/2019    Procedure: Lumbar L5/S1 IL XUAN;  Surgeon: Tacho Trivedi MD;  Location: [facility] PAIN MGT;  Service: Pain Management;  Laterality: N/A;    EPIDURAL STEROID INJECTION N/A 1/6/2020    Procedure: Lumbar L5/S1 IL XUAN;  Surgeon: Tacho Trivedi MD;  Location: [facility] PAIN MGT;  Service: Pain Management;  Laterality: N/A;    EPIDURAL STEROID INJECTION N/A 2/10/2020    Procedure: Caudal XUAN;  Surgeon: Tacho Trivedi MD;  Location: [facility] PAIN MGT;  Service: Pain Management;  Laterality: N/A;    ESOPHAGOGASTRODUODENOSCOPY N/A 9/22/2020    Procedure: EGD  (ESOPHAGOGASTRODUODENOSCOPY);  Surgeon: Javier Farmer MD;  Location: [facility] ENDO;  Service: Endoscopy;  Laterality: N/A;    INJECTION OF ANESTHETIC AGENT AROUND MEDIAL BRANCH NERVES INNERVATING LUMBAR FACET JOINT Bilateral 10/12/2020    Procedure: Bilateral L3-5 MBB;  Surgeon: Tacho Trivedi MD;  [facility];  Service: Pain Management;  Laterality: Bilateral;    INJECTION OF ANESTHETIC AGENT AROUND MEDIAL BRANCH NERVES INNERVATING LUMBAR FACET JOINT Bilateral 12/21/2020    Procedure: Bilateral L3-5 MBB with steroid;  Surgeon: Tacho Trivedi MD;  Location: [facility] PAIN MGT;  Service: Pain Management;  Laterality: Bilateral;    INTRALUMINAL GASTROINTESTINAL TRACT IMAGING VIA CAPSULE N/A 12/29/2021    Procedure: IMAGING PROCEDURE, GI TRACT, INTRALUMINAL, VIA CAPSULE;  Surgeon: Tahir Geronimo RN;  Location: [facility] ENDO;  Service: Endoscopy;  Laterality: N/A;    PROSTATE SURGERY      prostate radiation    RADIOFREQUENCY THERMOCOAGULATION Left 6/4/2021    Procedure: Left L3-5 Lumbar RFA;  Surgeon: Tacho Trivedi MD;  Location: [facility] PAIN MGT;  Service: Pain Management;  Laterality: Left;    RADIOFREQUENCY THERMOCOAGULATION Right 6/18/2021    Procedure: Right L3-5 Lumbar RFA;  Surgeon: Tacho Trivedi MD;  Location: [facility] PAIN MGT;  Service: Pain Management;  Laterality: Right;    REPLACEMENT OF PACEMAKER GENERATOR Left 6/16/2022    Procedure: REPLACEMENT, PULSE GENERATOR, CARDIAC PACEMAKER/CRT-P device;  Surgeon: Darrel Carrero MD;  Location: [facility] CATH LAB;  Service: Cardiology;  Laterality: Left;  NOT MRI safe   Pacer and leads implanted 9/30/2017, Dr. Souleymane CARROLLT Consulta CRT-P C4TR01, [id number]   A lead: Mdt 5049 CapSureFix® Novus, [id number]   RV lead: Mdt 5076 CapSureFix® Novus, [id number]   LV lead: Jovan 4196 At    SELECTIVE INJECTION OF ANESTHETIC AGENT AROUND LUMBAR SPINAL NERVE ROOT BY TRANSFORAMINAL APPROACH Bilateral 6/8/2022    Procedure: Bilateral L3/4 TF XUAN with RN IV  sedation;  Surgeon: Philipp Demarco MD;  Location: [facility] PAIN MGT;  Service: Pain Management;  Laterality: Bilateral;    SPINE SURGERY      fusion    TONSILLECTOMY      TRIAL OF SPINAL CORD NERVE STIMULATOR N/A 1/25/2023    Procedure: Trial, Neurostimulator, Spinal Cord with RN IV sedation;  Surgeon: Philipp Demarco MD;  Location: [facility] PAIN MGT;  Service: Pain Management;  Laterality: N/A;     Review of patient's allergies indicates:  No Known Allergies    Current Outpatient Medications   Medication Sig    acetaminophen (TYLENOL) 500 MG tablet Take 2 tablets (1,000 mg total) by mouth every 6 (six) hours as needed for Pain.    apixaban (ELIQUIS) 2.5 mg Tab Take 1 tablet (2.5 mg total) by mouth 2 (two) times daily.    atorvastatin (LIPITOR) 80 MG tablet Take 1 tablet (80 mg total) by mouth every evening.    blood sugar diagnostic Strp Check blood glucose levels daily in the AM fasting and 1-2 times more daily.    cefadroxil (DURICEF) 500 MG Cap Take 1 capsule (500 mg total) by mouth every 12 (twelve) hours.    cholecalciferol, vitamin D3, (VITAMIN D3) 25 mcg (1,000 unit) capsule Take 1,000 Units by mouth once daily.    fluticasone propionate (FLONASE) 50 mcg/actuation nasal spray USE 2 SPRAYS IN EACH NOSTRIL EVERY DAY    furosemide (LASIX) 40 MG tablet Take 1 tablet (40 mg total) by mouth 2 (two) times daily. Can double to 2 tablets twice a day for 3 days for more swelling/fluid build up - take 80mg twice a day    krill/om-3/dha/epa/phospho/ast (MEGARED OMEGA-3 KRILL OIL ORAL) Take 1 capsule by mouth every morning.    lancets Misc Check blood glucose levels daily in the AM fasting and 1-2 times more daily.    levocetirizine (XYZAL) 5 MG tablet Take 1 tablet (5 mg total) by mouth every evening.    losartan (COZAAR) 50 MG tablet Take 1 tablet (50 mg total) by mouth once daily.    magnesium oxide (MAG-OX) 400 mg (241.3 mg magnesium) tablet Take 1 tablet (400 mg total) by mouth once daily.    multivitamin capsule Take 1  "capsule by mouth once daily.    pantoprazole (PROTONIX) 40 MG tablet Take 1 tablet (40 mg total) by mouth once daily.    potassium chloride SA (K-DUR,KLOR-CON) 20 MEQ tablet Take 1 tablet (20 mEq total) by mouth once daily.    pregabalin (LYRICA) 50 MG capsule Take 1 capsule (50 mg total) by mouth once daily.    pyridoxine, vitamin B6, (B-6) 100 MG Tab Take 50 mg by mouth once daily.    spironolactone (ALDACTONE) 25 MG tablet Take 1 tablet (25 mg total) by mouth once daily.    vit A/vit C/vit E/zinc/copper (OCUVITE PRESERVISION ORAL) Take 1 capsule by mouth every evening.    clonazePAM (KLONOPIN) 1 MG tablet Take 1 tablet (1 mg total) by mouth nightly as needed for Anxiety.     No current facility-administered medications for this visit.     Facility-Administered Medications Ordered in Other Visits   Medication    ondansetron injection 4 mg       Review of Systems     GENERAL:  No weight loss, malaise or fevers.  HEENT:   No recent changes in vision or hearing  NECK:  Negative for lumps, no difficulty with swallowing.  RESPIRATORY:  Negative for cough, wheezing or shortness of breath, patient denies any recent URI.  CARDIOVASCULAR:  Negative for chest pain, leg swelling or palpitations.  GI:  Negative for abdominal discomfort, blood in stools or black stools or change in bowel habits.  MUSCULOSKELETAL:  See HPI.  SKIN:  Negative for lesions, rash, and itching.  PSYCH:  No mood disorder or recent psychosocial stressors.   HEMATOLOGY/LYMPHOLOGY:  Negative for prolonged bleeding, bruising easily or swollen nodes.    NEURO:   No history of headaches, syncope, paralysis, seizures or tremors.  All other reviewed and negative other than HPI.    OBJECTIVE:    /66   Pulse 70   Resp 17   Ht 5' 9" (1.753 m)   Wt 91.4 kg (201 lb 8 oz)   BMI 29.76 kg/m²       Physical Exam    GENERAL: Well appearing, in no acute distress, alert and oriented x3.  PSYCH:  Mood and affect appropriate.  SKIN: Skin color, texture, turgor " normal, no rashes or lesions.  HEAD/FACE:  Normocephalic, atraumatic. Cranial nerves grossly intact.    CV: RRR with palpation of the radial artery.  PULM: No evidence of respiratory difficulty, symmetric chest rise.  GI:  Soft and non-tender.    BACK: Straight leg raising in the sitting and supine positions is negative to radicular pain. No pain to palpation over the facet joints of the lumbar spine or spinous processes. Normal range of motion without pain reproduction.  EXTREMITIES: Peripheral joint ROM is full and pain free without obvious instability or laxity in all four extremities. No deformities, edema, or skin discoloration. Good capillary refill.  MUSCULOSKELETAL: Able to stand on heels & toes.   Shoulder, hip, and knee provocative maneuvers are negative.  There is no pain with palpation over the sacroiliac joints bilaterally.  FABERs test is negative.  FAER test is negative bilaterally.   Bilateral upper and lower extremity strength is normal and symmetric.  No atrophy or tone abnormalities are noted.  RIGHT Lower extremity: Hip flexion 5/5, Hip Abduction 5/5, Hip Adduction 5/5, Knee extension 5/5, Knee flexion 5/5, Ankle dorsiflexion5/5, Extensor hallucis longus 5/5, Ankle plantarflexion 5/5  LEFT Lower extremity:  Hip flexion 5/5, Hip Abduction 5/5,Hip Adduction 5/5, Knee extension 5/5, Knee flexion 5/5, Ankle dorsiflexion 5/5, Extensor hallucis longus 5/5, Ankle plantarflexion 5/5  -Normaltesting knee (patellar) jerk and ankle (achilles) jerk    NEURO: Bilateral upper and lower extremity coordination and muscle stretch reflexes are physiologic and symmetric. No loss of sensation is noted.  GAIT: normal.    Imaging:   CT lumbar spine 09/22/2021  FINDINGS:  Right total nephrectomy again noted.  Advanced atherosclerotic calcification.     Levoconvex lower lumbar scoliotic curvature secondary to asymmetric disc height loss on the right at L3-4 and L4-5.     T12-L1: Severe disc height loss with endplate  sclerosis, vacuum disc phenomenon, and anterior osteophyte formation.  No spinal canal stenosis.  No significant right neural foraminal stenosis.  Moderate left neural foraminal stenosis.     L1-2: No significant disc height loss.  Hypertrophic facet arthropathy.     L2-3: Chronic ankylosis of L2-3.  Moderate right and mild left neural foraminal stenosis.  No spinal canal stenosis.  Ankylosis of posterior elements also noted.     L3-4: Severe disc height loss with endplate sclerosis and osteophyte formation.  Severe right and moderate to severe left neural foraminal stenosis.  Severe hypertrophic facet arthropathy.     L4-5: Laminectomy changes noted. Leftward chronic subluxation of L4 relative to L3 and L5 with right worse than left disc height loss with endplate sclerosis and osteophyte formation.  Right-sided L4 vertebral height loss.  Severe right and mild left neural foraminal stenosis.  Severe hypertrophic facet arthropathy.     L5-S1: No significant disc height loss.  Mild left neural foraminal stenosis.  Severe hypertrophic facet arthropathy.     Impression:   Severe chronic multilevel discogenic degenerative change and facet arthropathy of the lumbar spine as described above.       09/25/19    X-Ray Lumbar Spine Complete 5 View    FINDINGS:  Scoliosis remains.  Vertebral body heights and alignment are stable.  Multilevel advanced degenerative disc height loss and osteophyte formation noted.  Multilevel facet arthropathy present more prevalent at the lower lumbar spine.  No acute osseous abnormality appreciated.  Aorta iliac atherosclerotic vascular calcifications noted.    08/26/14    X-Ray Cervical Spine AP And Lateral    Narrative  Findings: Vertebral alignment is normal.  There is narrowing of the disk spaces and  anterior osteophyte formation C 3-7.  There are no compression fractures or acute abnormalities are seen.  Carotid calcifications are noted bilaterally.    ASSESSMENT: 82 y.o. year old male  with lower back and right leg pain, consistent with     1. Causalgia of lower extremity, unspecified laterality        2. Failed back surgical syndrome        3. Chronic pain syndrome                PLAN:   - Interventions:   - Interventions: Patient is a candidate for spinal cord stimulation device secondary to chronic pain syndrome and multiple failed attempts of other interventions and medical management. Explained the risks and benefits of the procedure in detail with the patient today in clinic along with alternative treatment options, and the patient elected to pursue the intervention at this time.    SCS protocol:  -Neurophys evaluation: Ms. Beasley 08/16/22  -CT thoracic spine: 07/06/22  -Cardiovascular clearance: 10/27/22  : Low cardiovascular risk, hold Eliquis 3 prior  -CBC/CMP: 12/22/22  -Nevro spinal cord stimulator trial: 1/25-2/3/23.  We will extend trial until 02/03/2023 for better evaluation of degree of pain relief, improvement in functionality and quality of life prior to implant consideration.    - Anticoagulation use: yes  Eliquis  -HF, EF 35%, PPM  -Dr. Hood     report:  Reviewed and consistent with medication use as prescribed.      - Medications:  -continue Lyrica to see if this better controls neuropathic pain.  We discussed potential side effects of this medication which may include drowsiness,dizziness, dry mouth, constipation or peripheral edema.  -225 mg twice daily      -We have discussed continuing judicious use of Tylenol, not to exceed 3000 mg daily to avoid acute hepatotoxicity.      - Therapy:   We discussed continuing physical therapy to help manage the patient/s painful condition. The patient was counseled that muscle strengthening will improve the long term prognosis in regards to pain and may also help increase range of motion and mobility.     - Imaging: Reviewed available imaging with patient and answered any questions they had regarding study     - Follow up  visit: return to clinic in 2 days       The above plan and management options were discussed at length with patient. Patient is in agreement with the above and verbalized understanding.    - I discussed the goals of interventional chronic pain management with the patient on today's visit. We discussed a multimodal and systematic approach to pain.  This includes diagnostic and therapeutic injections, adjuvant pharmacologic treatment, physical therapy, and at times psychiatry.  I emphasized the importance of regular exercise, core strengthening and stretching, diet and weight loss as a cornerstone of long-term pain management.    - This condition does not require this patient to take time off of work, and the primary goal of our Pain Management services is to improve the patient's functional capacity.  - Patient Questions: Answered all of the patient's questions regarding diagnoses, therapy, treatment and next steps        Philipp Demarco MD  Interventional Pain Management  [facility]    Disclaimer:  This note was prepared using voice recognition system and is likely to have sound alike errors that may have been overlooked even after proof reading.  Please call me with any questions

## 2023-02-03 NOTE — PROGRESS NOTES
Established Patient Chronic Pain Note     Referring Physician: No ref. provider found    PCP: Byron Stevens MD    Chief Complaint:   LBP     SUBJECTIVE:    Interval History (02/03/2023):  Patient returns for 2nd follow-up after spinal cord stimulator trial on 01/25/2023.  Patient reports 85% relief in lower back pain as well as functional improvement in his ability to move around a 1000 perform normal activities.  Patient denies any systemic symptoms.  He denies any purulence or drainage from the site.  Pain is currently rated a 0 out 10. Denies any fevers, chills, changes in gait, weakness, or bowel and bladder incontinence      Interval Hx: 2/1/23  Patient presents status post spinal cord stimulator trial 01/25/2023 postop day 7.  Patient reports initial 100% relief in lower back pain at the beginning of the trial.  Patient reports progressing through programs #1 through #4, with reduction in pain relief.  Patient reports 100% relief on program 3.  Patient's wife in the room reports she believes he overall appeared to be in less pain, standing and ambulating more.  Patient reports he noticed almost immediate improvement in posture, no pain with sitting, which is typically when his pain is the most severe.  He does report some paresthesias in the left leg which were not present before the trial.  Patient denies any significant lower extremity weakness, bowel or bladder incontinence.            Interval Hx: 12/29/22  Presents for five-month follow-up.  Today he reports overall he has been doing well after pacemaker surgery with Dr. Carrero, but continues to report significant lower back pain.  Today pain is rated a 6/10.  Patient has been taking 3-6 extra-strength Tylenol daily with marginal improvement in pain, takes the edge off.   To reiterate, patient has undergone multiple epidural steroid injections, medial branch blocks, radiofrequency ablation, physical therapy and failed treatment with  anti-inflammatory and membrane stabilizing agents without significant relief.  Patient would like to pursue spinal cord stimulator trial.    Interval History (7/7/2022): Jake Ortiz presents today for follow-up visit.  he underwent Bilateral L3/4 TF XUAN on 06/08/2022.  The patient reports that he is/was unchanged following the procedure.  he reports 0% pain relief. Discussed SCS trial at previous visit, patient has read over brochures and spoken with friends/family that have an SCS and is interested in pursuing this procedure. Recently underwent pacemaker replacement on 06/16/2022.  Patient reports pain as 7/10 today. Reports continued low back pain without radiation. Reports last night he took 3 extra strength tylenol which temporarily mitigated his pain. Patient has undergone several injections including ESIs with varying levels and approaches, MBBs, RFAs, all with limited relief.      Interval history 05/10/2022  Patient presents for six-month follow-up.  He continues to report lower back pain at the level of L3-4 which radiates anteriorly.  Patient reports pain is exacerbated with prolonged standing and with ambulation.  Patient is interested in pursuing intervention.  Today patient denies significant lower extremity radiculopathy.  Again patient reiterates no significant relief following prior lumbar medial branch blocks or radiofrequency ablation.  Patient has read spinal cord stimulation literature and would like to continue with transforaminal injection at this time.    Interval history 11/17/2020  Patient presents for CT lumbar spine review.  Today patient reports pain has been relatively well controlled.  Patient reports he was dancing earlier today.  When pain is severe patient reports pain in the lower back which radiates down the right lower extremity along the lateral distribution to the calf.  Patient denies significant weakness in the lower extremities or new onset bowel or bladder incontinence or  gait ataxia.  Patient reviewed spinal cord stimulation educational material and would like to pursue pharmacologic management prior to trialing this.    HPI:  09/22/2021  Jake Ortiz is a 82 y.o. male with past medical history significant for chronic restrictive lung disease, lumbar spondylosis with radiculopathy status post L2-5 laminectomy (complicated by staph infection; [facility], 10 years prior), hypertension, hyperlipidemia, coronary artery disease status post myocardial infarctions status post triple-vessel CABG, atrial fibrillation, congestive heart failure status post percutaneous pacemaker placement, stage 4 chronic kidney disease, prostate adenocarcinoma, type 2 diabetes, GERD, obstructive sleep apnea, multi joint arthralgia who presents to the clinic for the evaluation of lower back and leg pain.  Patient reports longstanding history of lower back pain which is constant with radiation down the right lower extremity in L4-5 distribution to the calf.  Patient denies any radicular symptoms on the left.  Patient denies any significant weakness in bilateral lower extremities.  Pain is described as aching in nature and is a 7/10 today.  Pain at its worst is 10/10.  Pain is exacerbated with prolonged standing and with ambulation as well as with crossing his legs.  Patient is able to ambulate approximately 100 ft before requiring rest.  Pain is improved with lumbar flexion, stretching.  Patient has received multiple prior interventions including medial branch block, radiofrequency ablation, epidural and caudal epidural steroid injection without meaningful relief.    Of note patient last saw Dr. Trivedi December 16, 2020 with recommendation for medial branch block and consideration of radiofrequency ablation.    Patient denies night fever/night sweats, urinary incontinence, bowel incontinence, significant weight loss, significant motor weakness and loss of sensations.    Pain Disability Index Review:      Last 3 PDI Scores 9/6/2022 7/6/2022 5/10/2022   Pain Disability Index (PDI) 49 15 42       Non-Pharmacologic Treatments:  Physical Therapy/Home Exercise: yes  Ice/Heat:yes  TENS: no  Acupuncture: no  Massage: no  Chiropractic: no    Other: no      Pain Medications:  - Adjuvant Medications: Clonazepam (Klonopin) and Tylenol (Acetaminophen)  - Anti-Coagulants: Aspirin, Plavix ( Clopidogrel) and Eliquis    Pain Procedures:   Surgeries:  Lumbar surgery with Dr. Powers with complications with staph infection causing 2 subsequent surgeries  Injections:  -01/25/2023:  Spinal cord stimulator trial with Nevro, 85% relief with functional improvement  -June 4, 2021:  Left L3-5 lumbar radiofrequency ablation  -December 21, 2020:  Bilateral L3-5 medial branch block with steroid  - series of 3 injections (what sounds like ESIs) about 30 years ago with relief  - Lumbar Medial Branch Blocks and Lumbar Radiofrequency Ablation with limited relief  - L5/S1 IL XUAN on 11/22/19 with some pain relief for a few days  - L5/S1 IL XUAN on 1/6/2020 with limited pain relief for a few days  - caudal XUAN on 2/10/20 with 15% pain relief x 2 days  -10/12/2020 bilateral L3-5 medial branch blocks, 50% relief    Past Medical History:   Diagnosis Date    Abnormal PFT     Anemia     Arthritis     Atrial fibrillation 10/19/2017    Back pain     Congestive heart failure 03/05/2018    Coronary artery disease     DM (diabetes mellitus) 2018     am 06/23/2020    DM (diabetes mellitus) 2016     am 08/17/2021    Hyperlipemia     Hypertension     Myocardial infarction     NASREEN (obstructive sleep apnea) 06/05/2018    Prostate cancer 2015    Tobacco dependence     Type 2 diabetes mellitus      Past Surgical History:   Procedure Laterality Date    COLONOSCOPY N/A 9/22/2020    Procedure: COLONOSCOPY;  Surgeon: Javier Farmer MD;  Location: [facility];  Service: Endoscopy;  Laterality: N/A;    CORONARY ARTERY BYPASS GRAFT  1987    EPIDURAL  STEROID INJECTION N/A 11/22/2019    Procedure: Lumbar L5/S1 IL XUAN;  Surgeon: Tacho Trivedi MD;  [facility];  Service: Pain Management;  Laterality: N/A;    EPIDURAL STEROID INJECTION N/A 1/6/2020    Procedure: Lumbar L5/S1 IL XUAN;  Surgeon: Tacho Trivedi MD;  [facility];  Service: Pain Management;  Laterality: N/A;    EPIDURAL STEROID INJECTION N/A 2/10/2020    Procedure: Caudal XUAN;  Surgeon: Tacho Trivedi MD;  [facility];  Service: Pain Management;  Laterality: N/A;    ESOPHAGOGASTRODUODENOSCOPY N/A 9/22/2020    Procedure: EGD (ESOPHAGOGASTRODUODENOSCOPY);  Surgeon: Javier Farmer MD;  Location: [facility];  Service: Endoscopy;  Laterality: N/A;    INJECTION OF ANESTHETIC AGENT AROUND MEDIAL BRANCH NERVES INNERVATING LUMBAR FACET JOINT Bilateral 10/12/2020    Procedure: Bilateral L3-5 MBB;  Surgeon: Tacho Trivedi MD;  Location: [facility];  Service: Pain Management;  Laterality: Bilateral;    INJECTION OF ANESTHETIC AGENT AROUND MEDIAL BRANCH NERVES INNERVATING LUMBAR FACET JOINT Bilateral 12/21/2020    Procedure: Bilateral L3-5 MBB with steroid;  Surgeon: Tacho Trivedi MD;  Location: [facility];  Service: Pain Management;  Laterality: Bilateral;    INTRALUMINAL GASTROINTESTINAL TRACT IMAGING VIA CAPSULE N/A 12/29/2021    Procedure: IMAGING PROCEDURE, GI TRACT, INTRALUMINAL, VIA CAPSULE;  Surgeon: Tahir Geronimo RN;  Location: [facility] ENDO;  Service: Endoscopy;  Laterality: N/A;    PROSTATE SURGERY      prostate radiation    RADIOFREQUENCY THERMOCOAGULATION Left 6/4/2021    Procedure: Left L3-5 Lumbar RFA;  Surgeon: Tacho Trivedi MD;  Location: [facility];  Service: Pain Management;  Laterality: Left;    RADIOFREQUENCY THERMOCOAGULATION Right 6/18/2021    Procedure: Right L3-5 Lumbar RFA;  Surgeon: Tacho Trivedi MD;  Location: [facility];  Service: Pain Management;  Laterality: Right;    REPLACEMENT OF PACEMAKER GENERATOR Left  6/16/2022    Procedure: REPLACEMENT, PULSE GENERATOR, CARDIAC PACEMAKER/CRT-P device;  Surgeon: Darrel Carrero MD;  Location: [facility] CATH LAB;  Service: Cardiology;  Laterality: Left;  NOT MRI safe   Pacer and leads implanted 9/30/2017, Dr. Souleymane CARROLLT Consulta CRT-P C4TR01, [id number]   A lead: Mdt 5076 CapSureFix® Novus, [id number]   RV lead: Mdt 5076 CapSureFix® Novus, [id number]   LV lead: Mdt 4196 At    SELECTIVE INJECTION OF ANESTHETIC AGENT AROUND LUMBAR SPINAL NERVE ROOT BY TRANSFORAMINAL APPROACH Bilateral 6/8/2022    Procedure: Bilateral L3/4 TF XUAN with RN IV sedation;  Surgeon: Philipp Demarco MD;  Location: [facility]GT;  Service: Pain Management;  Laterality: Bilateral;    SPINE SURGERY      fusion    TONSILLECTOMY      TRIAL OF SPINAL CORD NERVE STIMULATOR N/A 1/25/2023    Procedure: Trial, Neurostimulator, Spinal Cord with RN IV sedation;  Surgeon: Philipp Demarco MD;  Location: [facility]IN [facility]GT;  Service: Pain Management;  Laterality: N/A;     Review of patient's allergies indicates:  No Known Allergies    Current Outpatient Medications   Medication Sig    acetaminophen (TYLENOL) 500 MG tablet Take 2 tablets (1,000 mg total) by mouth every 6 (six) hours as needed for Pain.    apixaban (ELIQUIS) 2.5 mg Tab Take 1 tablet (2.5 mg total) by mouth 2 (two) times daily.    atorvastatin (LIPITOR) 80 MG tablet Take 1 tablet (80 mg total) by mouth every evening.    blood sugar diagnostic Strp Check blood glucose levels daily in the AM fasting and 1-2 times more daily.    cefadroxil (DURICEF) 500 MG Cap Take 1 capsule (500 mg total) by mouth every 12 (twelve) hours.    cholecalciferol, vitamin D3, (VITAMIN D3) 25 mcg (1,000 unit) capsule Take 1,000 Units by mouth once daily.    fluticasone propionate (FLONASE) 50 mcg/actuation nasal spray USE 2 SPRAYS IN EACH NOSTRIL EVERY DAY    furosemide (LASIX) 40 MG tablet Take 1 tablet (40 mg total) by mouth 2 (two) times daily. Can double to 2 tablets twice a day for  3 days for more swelling/fluid build up - take 80mg twice a day    krill/om-3/dha/epa/phospho/ast (MEGARED OMEGA-3 KRILL OIL ORAL) Take 1 capsule by mouth every morning.    lancets Misc Check blood glucose levels daily in the AM fasting and 1-2 times more daily.    levocetirizine (XYZAL) 5 MG tablet Take 1 tablet (5 mg total) by mouth every evening.    losartan (COZAAR) 50 MG tablet Take 1 tablet (50 mg total) by mouth once daily.    magnesium oxide (MAG-OX) 400 mg (241.3 mg magnesium) tablet Take 1 tablet (400 mg total) by mouth once daily.    multivitamin capsule Take 1 capsule by mouth once daily.    pantoprazole (PROTONIX) 40 MG tablet Take 1 tablet (40 mg total) by mouth once daily.    potassium chloride SA (K-DUR,KLOR-CON) 20 MEQ tablet Take 1 tablet (20 mEq total) by mouth once daily.    pregabalin (LYRICA) 50 MG capsule Take 1 capsule (50 mg total) by mouth once daily.    pyridoxine, vitamin B6, (B-6) 100 MG Tab Take 50 mg by mouth once daily.    spironolactone (ALDACTONE) 25 MG tablet Take 1 tablet (25 mg total) by mouth once daily.    vit A/vit C/vit E/zinc/copper (OCUVITE PRESERVISION ORAL) Take 1 capsule by mouth every evening.    clonazePAM (KLONOPIN) 1 MG tablet Take 1 tablet (1 mg total) by mouth nightly as needed for Anxiety.     No current facility-administered medications for this visit.     Facility-Administered Medications Ordered in Other Visits   Medication    ondansetron injection 4 mg       Review of Systems     GENERAL:  No weight loss, malaise or fevers.  HEENT:   No recent changes in vision or hearing  NECK:  Negative for lumps, no difficulty with swallowing.  RESPIRATORY:  Negative for cough, wheezing or shortness of breath, patient denies any recent URI.  CARDIOVASCULAR:  Negative for chest pain, leg swelling or palpitations.  GI:  Negative for abdominal discomfort, blood in stools or black stools or change in bowel habits.  MUSCULOSKELETAL:  See HPI.  SKIN:  Negative for lesions, rash,  "and itching.  PSYCH:  No mood disorder or recent psychosocial stressors.   HEMATOLOGY/LYMPHOLOGY:  Negative for prolonged bleeding, bruising easily or swollen nodes.    NEURO:   No history of headaches, syncope, paralysis, seizures or tremors.  All other reviewed and negative other than HPI.    OBJECTIVE:    /65   Pulse 90   Ht 5' 9" (1.753 m)   Wt 88.3 kg (194 lb 10.7 oz)   BMI 28.75 kg/m²       Physical Exam    GENERAL: Well appearing, in no acute distress, alert and oriented x3.  PSYCH:  Mood and affect appropriate.  SKIN: Skin color, texture, turgor normal, no rashes or lesions.  HEAD/FACE:  Normocephalic, atraumatic. Cranial nerves grossly intact.    CV: RRR with palpation of the radial artery.  PULM: No evidence of respiratory difficulty, symmetric chest rise.  GI:  Soft and non-tender.    BACK: Straight leg raising in the sitting and supine positions is negative to radicular pain. No pain to palpation over the facet joints of the lumbar spine or spinous processes. Normal range of motion without pain reproduction.  Surgical site spinal cord stimulator is clean dry and intact with no signs of purulence or drainage.  EXTREMITIES: Peripheral joint ROM is full and pain free without obvious instability or laxity in all four extremities. No deformities, edema, or skin discoloration. Good capillary refill.  MUSCULOSKELETAL: Able to stand on heels & toes.   Shoulder, hip, and knee provocative maneuvers are negative.  There is no pain with palpation over the sacroiliac joints bilaterally.  FABERs test is negative.  FAER test is negative bilaterally.   Bilateral upper and lower extremity strength is normal and symmetric.  No atrophy or tone abnormalities are noted.  RIGHT Lower extremity: Hip flexion 5/5, Hip Abduction 5/5, Hip Adduction 5/5, Knee extension 5/5, Knee flexion 5/5, Ankle dorsiflexion5/5, Extensor hallucis longus 5/5, Ankle plantarflexion 5/5  LEFT Lower extremity:  Hip flexion 5/5, Hip Abduction " 5/5,Hip Adduction 5/5, Knee extension 5/5, Knee flexion 5/5, Ankle dorsiflexion 5/5, Extensor hallucis longus 5/5, Ankle plantarflexion 5/5  -Normaltesting knee (patellar) jerk and ankle (achilles) jerk    NEURO: Bilateral upper and lower extremity coordination and muscle stretch reflexes are physiologic and symmetric. No loss of sensation is noted.  GAIT: normal.    Imaging:   CT lumbar spine 09/22/2021  FINDINGS:  Right total nephrectomy again noted.  Advanced atherosclerotic calcification.     Levoconvex lower lumbar scoliotic curvature secondary to asymmetric disc height loss on the right at L3-4 and L4-5.     T12-L1: Severe disc height loss with endplate sclerosis, vacuum disc phenomenon, and anterior osteophyte formation.  No spinal canal stenosis.  No significant right neural foraminal stenosis.  Moderate left neural foraminal stenosis.     L1-2: No significant disc height loss.  Hypertrophic facet arthropathy.     L2-3: Chronic ankylosis of L2-3.  Moderate right and mild left neural foraminal stenosis.  No spinal canal stenosis.  Ankylosis of posterior elements also noted.     L3-4: Severe disc height loss with endplate sclerosis and osteophyte formation.  Severe right and moderate to severe left neural foraminal stenosis.  Severe hypertrophic facet arthropathy.     L4-5: Laminectomy changes noted. Leftward chronic subluxation of L4 relative to L3 and L5 with right worse than left disc height loss with endplate sclerosis and osteophyte formation.  Right-sided L4 vertebral height loss.  Severe right and mild left neural foraminal stenosis.  Severe hypertrophic facet arthropathy.     L5-S1: No significant disc height loss.  Mild left neural foraminal stenosis.  Severe hypertrophic facet arthropathy.     Impression:   Severe chronic multilevel discogenic degenerative change and facet arthropathy of the lumbar spine as described above.       09/25/19    X-Ray Lumbar Spine Complete 5 View    FINDINGS:  Scoliosis  remains.  Vertebral body heights and alignment are stable.  Multilevel advanced degenerative disc height loss and osteophyte formation noted.  Multilevel facet arthropathy present more prevalent at the lower lumbar spine.  No acute osseous abnormality appreciated.  Aorta iliac atherosclerotic vascular calcifications noted.    08/26/14    X-Ray Cervical Spine AP And Lateral    Narrative  Findings: Vertebral alignment is normal.  There is narrowing of the disk spaces and  anterior osteophyte formation C 3-7.  There are no compression fractures or acute abnormalities are seen.  Carotid calcifications are noted bilaterally.    ASSESSMENT: 82 y.o. year old male with lower back and right leg pain, consistent with     No diagnosis found.          PLAN:   - Interventions:    Schedule patient for implantation of spinal cord stimulator with IPG after successful trial.  Patient will have to hold his Eliquis 3 days prior to implantation.   Trial leads removed today in clinic.  Surgical site was noted to be clean dry and intact with no purulence or drainage.  Spinal cord stimulator lead tips were intact.  Patient may restart his Eliquis tomorrow.    -01/25/2023:  Spinal cord stimulator trial with Nevro, 85% relief with functional improvement    SCS protocol:  -Neurophys evaluation: Ms. Beasley 08/16/22  -CT thoracic spine: 07/06/22  -Cardiovascular clearance: 10/27/22  : Low cardiovascular risk, hold Eliquis 3 prior  -CBC/CMP: 12/22/22  -Nevro spinal cord stimulator trial: 1/25-2/3/23.  We will extend trial until 02/03/2023 for better evaluation of degree of pain relief, improvement in functionality and quality of life prior to implant consideration.    - Anticoagulation use: yes  Eliquis  -HF, EF 35%, PPM  -Dr. Hood     report:  Reviewed and consistent with medication use as prescribed.      - Medications:  -continue Lyrica to see if this better controls neuropathic pain.  We discussed potential side effects of this  medication which may include drowsiness,dizziness, dry mouth, constipation or peripheral edema.  -225 mg twice daily      -We have discussed continuing judicious use of Tylenol, not to exceed 3000 mg daily to avoid acute hepatotoxicity.      - Therapy:   We discussed continuing physical therapy to help manage the patient/s painful condition. The patient was counseled that muscle strengthening will improve the long term prognosis in regards to pain and may also help increase range of motion and mobility.     - Imaging: Reviewed available imaging with patient and answered any questions they had regarding study     - Follow up visit: return to clinic 5 days after procedure      The above plan and management options were discussed at length with patient. Patient is in agreement with the above and verbalized understanding.    - I discussed the goals of interventional chronic pain management with the patient on today's visit. We discussed a multimodal and systematic approach to pain.  This includes diagnostic and therapeutic injections, adjuvant pharmacologic treatment, physical therapy, and at times psychiatry.  I emphasized the importance of regular exercise, core strengthening and stretching, diet and weight loss as a cornerstone of long-term pain management.    - This condition does not require this patient to take time off of work, and the primary goal of our Pain Management services is to improve the patient's functional capacity.  - Patient Questions: Answered all of the patient's questions regarding diagnoses, therapy, treatment and next steps        Rachid Ochoa MD  Interventional Pain Management  [facility]    Disclaimer:  This note was prepared using voice recognition system and is likely to have sound alike errors that may have been overlooked even after proof reading.  Please call me with any questions

## 2023-02-04 NOTE — ASSESSMENT & PLAN NOTE
Lab Results   Component Value Date    (H) 01/27/2023     Elevated MCV  Most recent B12/folate levels WNL  Plan to continue further evaluation

## 2023-02-04 NOTE — ASSESSMENT & PLAN NOTE
Lab Results   Component Value Date    IRON 91 12/22/2022    TRANSFERRIN 190 (L) 12/22/2022    TIBC 281 12/22/2022    FESATURATED 32 12/22/2022        FERRITIN                                                      160    S/p  Injectafer x 2 doses 09/15 & 09/22  Iron studies WNL  Continue on oral supplementation  Plan to reevaluate q 3 months to assess need for additional  IV iron therapy

## 2023-02-04 NOTE — ASSESSMENT & PLAN NOTE
Lab Results   Component Value Date    HGB 9.3 (L) 01/27/2023     Proceed with Retacrit today     Follow-up in 4 weeks with cbc & cmp for possible Retacrit

## 2023-02-28 NOTE — ASSESSMENT & PLAN NOTE
S/p IV iron x 2 9/2022. Continues on oral iron supplement with adequate iron levels    s/p EGD/Colonoscopy 9/2020 with essentially unremarkable findings. No clear source of bleeding. Patient reports unable to have video capsule in the past due to having pacemaker which is apparently contraindicated with VCE. GI recommends close follow up with lab monitoring

## 2023-02-28 NOTE — PROGRESS NOTES
Subjective:       Patient ID: Jake Ortiz is a 82 y.o. male.    Chief Complaint: f/u anemia    HPI: 82 y.o male with CKD, anemia, iron deficiency presenting today for follow up of his anemia of CKD managed with Procrit PRN to maintain hg near 10 range. Patient has known h/o iron deficiency with unremarkable EGD/Colonoscopy 2020. VCE 2021 normal. Denies anemia symptoms. Reports chronic intermittent bright red bleeding in stools. Prior occult stool testing negative. Prior Colonoscopy did show hemorrhoids.     Today he feels well and is without complaints. Denies noting abnormal bleeding or anemia symptoms.       Social History     Socioeconomic History    Marital status:     Number of children: 0   Occupational History    Occupation: retired     Employer: [employer]   Tobacco Use    Smoking status: Former     Packs/day: 1.50     Years: 20.00     Pack years: 30.00     Types: Cigarettes     Start date:      Quit date:      Years since quittin.1    Smokeless tobacco: Never   Substance and Sexual Activity    Alcohol use: No    Drug use: No    Sexual activity: Not Currently     Social Determinants of Health     Financial Resource Strain: Low Risk     Difficulty of Paying Living Expenses: Not hard at all   Food Insecurity: No Food Insecurity    Worried About Running Out of Food in the Last Year: Never true    Ran Out of Food in the Last Year: Never true   Transportation Needs: Unmet Transportation Needs    Lack of Transportation (Medical): Yes    Lack of Transportation (Non-Medical): No   Physical Activity: Inactive    Days of Exercise per Week: 0 days    Minutes of Exercise per Session: 0 min   Stress: No Stress Concern Present    Feeling of Stress : Not at all   Social Connections: Socially Integrated    Frequency of Communication with Friends and Family: More than three times a week    Frequency of Social Gatherings with Friends and Family: More than three times a week     Attends Sikhism Services: More than 4 times per year    Active Member of Clubs or Organizations: Yes    Attends Club or Organization Meetings: More than 4 times per year    Marital Status:    Housing Stability: High Risk    Unable to Pay for Housing in the Last Year: No    Number of Places Lived in the Last Year: 1    Unstable Housing in the Last Year: Yes       Past Medical History:   Diagnosis Date    Abnormal PFT     Anemia     Arthritis     Atrial fibrillation 10/19/2017    Back pain     Congestive heart failure 03/05/2018    Coronary artery disease     DM (diabetes mellitus) 2018     am 06/23/2020    DM (diabetes mellitus) 2016     am 08/17/2021    Hyperlipemia     Hypertension     Myocardial infarction     NASREEN (obstructive sleep apnea) 06/05/2018    Prostate cancer 2015    Tobacco dependence     Type 2 diabetes mellitus        Family History   Problem Relation Age of Onset    Heart attack Mother     Diabetes Mother     Heart disease Mother     Cataracts Mother     Stroke Father     Heart disease Father     Heart disease Brother        Past Surgical History:   Procedure Laterality Date    COLONOSCOPY N/A 9/22/2020    Procedure: COLONOSCOPY;  Surgeon: Javier Farmer MD;  Location: [facility] ENDO;  Service: Endoscopy;  Laterality: N/A;    CORONARY ARTERY BYPASS GRAFT  1987    EPIDURAL STEROID INJECTION N/A 11/22/2019    Procedure: Lumbar L5/S1 IL XUAN;  Surgeon: Tacho Trivedi MD;  Location: [facility] PAIN MGT;  Service: Pain Management;  Laterality: N/A;    EPIDURAL STEROID INJECTION N/A 1/6/2020    Procedure: Lumbar L5/S1 IL XUAN;  Surgeon: Tacho Trivedi MD;  Location: [facility] PAIN MGT;  Service: Pain Management;  Laterality: N/A;    EPIDURAL STEROID INJECTION N/A 2/10/2020    Procedure: Caudal XUAN;  Surgeon: Tacho Trivedi MD;  Location: [facility] PAIN MGT;  Service: Pain Management;  Laterality: N/A;    ESOPHAGOGASTRODUODENOSCOPY N/A 9/22/2020     Procedure: EGD (ESOPHAGOGASTRODUODENOSCOPY);  Surgeon: Javier Farmer MD;  Location: [facility] ENDO;  Service: Endoscopy;  Laterality: N/A;    INJECTION OF ANESTHETIC AGENT AROUND MEDIAL BRANCH NERVES INNERVATING LUMBAR FACET JOINT Bilateral 10/12/2020    Procedure: Bilateral L3-5 MBB;  Surgeon: Tacho Trivedi MD;  [facility];  Service: Pain Management;  Laterality: Bilateral;    INJECTION OF ANESTHETIC AGENT AROUND MEDIAL BRANCH NERVES INNERVATING LUMBAR FACET JOINT Bilateral 12/21/2020    Procedure: Bilateral L3-5 MBB with steroid;  Surgeon: Tacho Trivedi MD;  Location: [facility] PAIN MGT;  Service: Pain Management;  Laterality: Bilateral;    INTRALUMINAL GASTROINTESTINAL TRACT IMAGING VIA CAPSULE N/A 12/29/2021    Procedure: IMAGING PROCEDURE, GI TRACT, INTRALUMINAL, VIA CAPSULE;  Surgeon: Tahir Geronimo RN;  Location: [facility] ENDO;  Service: Endoscopy;  Laterality: N/A;    PROSTATE SURGERY      prostate radiation    RADIOFREQUENCY THERMOCOAGULATION Left 6/4/2021    Procedure: Left L3-5 Lumbar RFA;  Surgeon: Tacho Trivedi MD;  Location: [facility] PAIN MGT;  Service: Pain Management;  Laterality: Left;    RADIOFREQUENCY THERMOCOAGULATION Right 6/18/2021    Procedure: Right L3-5 Lumbar RFA;  Surgeon: Tacho Trivedi MD;  Location: [facility] PAIN MGT;  Service: Pain Management;  Laterality: Right;    REPLACEMENT OF PACEMAKER GENERATOR Left 6/16/2022    Procedure: REPLACEMENT, PULSE GENERATOR, CARDIAC PACEMAKER/CRT-P device;  Surgeon: Darrel Carrero MD;  Location: [facility] CATH LAB;  Service: Cardiology;  Laterality: Left;  NOT MRI safe   Pacer and leads implanted 9/30/2017, Dr. Souleymane CARROLLT Consulta CRT-P [id number], [id number]   A lead: Mdt 5067 CapSureFix® Novus, [id number]   RV lead: Mdt 5076 CapSureFix® Novus, [id number]   LV lead: Jovan 4196 At    SELECTIVE INJECTION OF ANESTHETIC AGENT AROUND LUMBAR SPINAL NERVE ROOT BY TRANSFORAMINAL APPROACH Bilateral 6/8/2022    Procedure: Bilateral L3/4 TF  XUAN with RN IV sedation;  Surgeon: Philipp Demarco MD;  Location: [facility];  Service: Pain Management;  Laterality: Bilateral;    SPINE SURGERY      fusion    TONSILLECTOMY      TRIAL OF SPINAL CORD NERVE STIMULATOR N/A 1/25/2023    Procedure: Trial, Neurostimulator, Spinal Cord with RN IV sedation;  Surgeon: Philipp Demarco MD;  Location: [facility];  Service: Pain Management;  Laterality: N/A;       Review of Systems   Constitutional:  Negative for activity change, appetite change, chills, fatigue, fever and unexpected weight change.   HENT:  Negative for congestion, mouth sores, nosebleeds, sore throat, trouble swallowing and voice change.    Eyes:  Negative for visual disturbance.   Respiratory:  Negative for cough, chest tightness, shortness of breath and wheezing.    Cardiovascular:  Negative for chest pain and leg swelling.   Gastrointestinal:  Negative for abdominal distention, abdominal pain, blood in stool, constipation, diarrhea, nausea and vomiting.   Genitourinary:  Negative for difficulty urinating, dysuria and hematuria.   Musculoskeletal:  Negative for arthralgias, back pain and myalgias.   Skin:  Negative for pallor, rash and wound.   Neurological:  Negative for dizziness, syncope, weakness and headaches.   Hematological:  Negative for adenopathy. Does not bruise/bleed easily.   Psychiatric/Behavioral:  The patient is not nervous/anxious.    All other systems reviewed and are negative.      Medication List with Changes/Refills   Current Medications    ACETAMINOPHEN (TYLENOL) 500 MG TABLET    Take 2 tablets (1,000 mg total) by mouth every 6 (six) hours as needed for Pain.    APIXABAN (ELIQUIS) 2.5 MG TAB    Take 1 tablet (2.5 mg total) by mouth 2 (two) times daily.    ATORVASTATIN (LIPITOR) 80 MG TABLET    Take 1 tablet (80 mg total) by mouth every evening.    BLOOD SUGAR DIAGNOSTIC STRP    Check blood glucose levels daily in the AM fasting and 1-2 times more daily.    CEFADROXIL (DURICEF)  500 MG CAP    Take 1 capsule (500 mg total) by mouth every 12 (twelve) hours.    CHOLECALCIFEROL, VITAMIN D3, (VITAMIN D3) 25 MCG (1,000 UNIT) CAPSULE    Take 1,000 Units by mouth once daily.    CLONAZEPAM (KLONOPIN) 1 MG TABLET    Take 1 tablet (1 mg total) by mouth nightly as needed for Anxiety.    FLUTICASONE PROPIONATE (FLONASE) 50 MCG/ACTUATION NASAL SPRAY    USE 2 SPRAYS IN EACH NOSTRIL EVERY DAY    FUROSEMIDE (LASIX) 40 MG TABLET    Take 1 tablet (40 mg total) by mouth 2 (two) times daily. Can double to 2 tablets twice a day for 3 days for more swelling/fluid build up - take 80mg twice a day    KRILL/OM-3/DHA/EPA/PHOSPHO/AST (MEGARED OMEGA-3 KRILL OIL ORAL)    Take 1 capsule by mouth every morning.    LANCETS MISC    Check blood glucose levels daily in the AM fasting and 1-2 times more daily.    LEVOCETIRIZINE (XYZAL) 5 MG TABLET    Take 1 tablet (5 mg total) by mouth every evening.    LOSARTAN (COZAAR) 50 MG TABLET    Take 1 tablet (50 mg total) by mouth once daily.    MAGNESIUM OXIDE (MAG-OX) 400 MG (241.3 MG MAGNESIUM) TABLET    Take 1 tablet (400 mg total) by mouth once daily.    MULTIVITAMIN CAPSULE    Take 1 capsule by mouth once daily.    PANTOPRAZOLE (PROTONIX) 40 MG TABLET    Take 1 tablet (40 mg total) by mouth once daily.    POTASSIUM CHLORIDE SA (K-DUR,KLOR-CON) 20 MEQ TABLET    Take 1 tablet (20 mEq total) by mouth once daily.    PREGABALIN (LYRICA) 50 MG CAPSULE    Take 1 capsule (50 mg total) by mouth once daily.    PYRIDOXINE, VITAMIN B6, (B-6) 100 MG TAB    Take 50 mg by mouth once daily.    SPIRONOLACTONE (ALDACTONE) 25 MG TABLET    Take 1 tablet (25 mg total) by mouth once daily.    VIT A/VIT C/VIT E/ZINC/COPPER (OCUVITE PRESERVISION ORAL)    Take 1 capsule by mouth every evening.     Objective:     Vitals:    02/28/23 1018   BP: 109/61   Pulse: 84   Temp: 97.1 °F (36.2 °C)     Lab Results   Component Value Date    WBC 5.53 02/27/2023    HGB 9.7 (L) 02/27/2023    HCT 29.9 (L) 02/27/2023      (H) 02/27/2023     02/27/2023       BMP  Lab Results   Component Value Date     02/24/2023    K 4.5 02/24/2023     02/24/2023    CO2 22 (L) 02/24/2023    BUN 40 (H) 02/24/2023    CREATININE 1.7 (H) 02/24/2023    CALCIUM 9.8 02/24/2023    ANIONGAP 10 02/24/2023    ESTGFRAFRICA 50 (A) 07/14/2022    EGFRNONAA 43 (A) 07/14/2022     Lab Results   Component Value Date    ALT 12 02/24/2023    AST 25 02/24/2023    GGT 58 (H) 08/08/2018    ALKPHOS 74 02/24/2023    BILITOT 0.3 02/24/2023     Lab Results   Component Value Date    IRON 102 02/24/2023    TIBC 321 02/24/2023    FERRITIN 69 02/24/2023         Physical Exam  Vitals reviewed.   Constitutional:       Appearance: He is well-developed.   HENT:      Head: Normocephalic.      Right Ear: External ear normal.      Left Ear: External ear normal.      Nose: Nose normal.   Eyes:      General: Lids are normal. No scleral icterus.        Right eye: No discharge.         Left eye: No discharge.      Conjunctiva/sclera: Conjunctivae normal.   Neck:      Thyroid: No thyroid mass.   Cardiovascular:      Rate and Rhythm: Normal rate and regular rhythm.      Heart sounds: Normal heart sounds. No murmur heard.  Pulmonary:      Effort: Pulmonary effort is normal. No respiratory distress.      Breath sounds: Normal breath sounds. No wheezing or rales.   Abdominal:      General: Bowel sounds are normal. There is no distension.      Palpations: Abdomen is soft.      Tenderness: There is no abdominal tenderness.   Genitourinary:     Comments: deferred  Musculoskeletal:         General: Normal range of motion.      Cervical back: Normal range of motion.   Skin:     General: Skin is warm and dry.   Neurological:      Mental Status: He is alert and oriented to person, place, and time.   Psychiatric:         Speech: Speech normal.         Behavior: Behavior normal. Behavior is cooperative.         Thought Content: Thought content normal.        Assessment:      Problem List Items Addressed This Visit          Oncology    Iron deficiency anemia due to chronic blood loss     S/p IV iron x 2 9/2022. Continues on oral iron supplement with adequate iron levels    s/p EGD/Colonoscopy 9/2020 with essentially unremarkable findings. No clear source of bleeding. Patient reports unable to have video capsule in the past due to having pacemaker which is apparently contraindicated with VCE. 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 Retacrit PRN              Plan:     Anemia associated with stage 4 chronic renal failure    Iron deficiency anemia due to chronic blood loss      Med Onc Chart Routing      Follow up with physician    Follow up with KAYLIN 1 month.   Infusion scheduling note    Injection scheduling note Retacrit PRN   Labs CBC   Lab interval:     Imaging None      Pharmacy appointment No pharmacy appointment needed      Other referrals No additional referrals needed           DANIEL Shearer

## 2023-02-28 NOTE — DISCHARGE INSTRUCTIONS
..[facility]  [address] [address]  [address]  [phone] phone     [phone] fax  Hours of Operation: Monday- Friday 8:00am- 5:00pm  After hours phone  [phone]  Hematology / Oncology Physicians on call    Dr. Portillo Barrera      Nurse Practitioners:    Natalie Ying, NEGIN Virgen, NEGIN Mckay, NEGIN Manzo, NEGIN Hatfield, NEGIN Allen, PA      Please don't hesitate to call if you have any concerns.    .FALL PREVENTION   Falls often occur due to slipping, tripping or losing your balance. Here are ways to reduce your risk of falling again.   Was there anything that caused your fall that can be fixed, removed or replaced?   Make your home safe by keeping walkways clear of objects you may trip over.   Use non-slip pads under rugs.   Do not walk in poorly lit areas.   Do not stand on chairs or wobbly ladders.   Use caution when reaching overhead or looking upward. This position can cause a loss of balance.   Be sure your shoes fit properly, have non-slip bottoms and are in good condition.   Be cautious when going up and down stairs, curbs, and when walking on uneven sidewalks.   If your balance is poor, consider using a cane or walker.   If your fall was related to alcohol use, stop or limit alcohol intake.   If your fall was related to use of sleeping medicines, talk to your doctor about this. You may need to reduce your dosage at bedtime if you awaken during the night to go to the bathroom.   To reduce the need for nighttime bathroom trips:   Avoid drinking fluids for several hours before going to bed   Empty your bladder before going to bed   Men can keep a urinal at the bedside   © 6597-9659 [employer], [address] [place] [address], [place] [address]. All rights reserved. This information is not intended as a substitute for professional medical care. Always follow your healthcare  professional's instructions.  .WAYS TO HELP PREVENT INFECTION        WASH YOUR HANDS OFTEN DURING THE DAY, ESPECIALLY BEFORE YOU EAT, AFTER USING THE BATHROOM, AND AFTER TOUCHING ANIMALS    STAY AWAY FROM PEOPLE WHO HAVE ILLNESSES YOU CAN CATCH; SUCH AS COLDS, FLU, CHICKEN POX    TRY TO AVOID CROWDS    STAY AWAY FROM CHILDREN WHO RECENTLY HAVE RECEIVED LIVE VIRUS VACCINES    MAINTAIN GOOD MOUTH CARE    DO NOT SQUEEZE OR SCRATCH PIMPLES    CLEAN CUTS & SCRAPES RIGHT AWAY AND DAILY UNTIL HEALED WITH WARM WATER, SOAP & AN ANTISEPTIC    AVOID CONTACT WITH LITTER BOXES, BIRD CAGES, & FISH TANKS    AVOID STANDING WATER, IE., BIRD BATHS, FLOWER POTS/VASES, OR HUMIDIFIERS    WEAR GLOVES WHEN GARDENING OR CLEANING UP AFTER OTHERS, ESPECIALLY BABIES & SMALL CHILDREN    DO NOT EAT RAW FISH, SEAFOOD, MEAT, OR EGGS

## 2023-03-16 NOTE — TELEPHONE ENCOUNTER
Called and spoke with Dr Helm with Anesthesia Dept regarding pt health history, specifically the patient's cardiac clearance completed on 10/27/23. Anesthesia response: okay to proceed with surgery. Notify Pt to follow anticoagulant recs given by cardiologist.   Patient scheduled for surgery on 3/23/23.

## 2023-03-17 NOTE — TELEPHONE ENCOUNTER
Pre op instructions reviewed with Pt ,verbalized understanding.    To confirm, Surgery is scheduled on 3/23/23. We will call you late afternoon the business day prior to surgery with your arrival time.    *Please report to the [facility] (1st Floor) located off of [facility] (2nd Entrance/Building on the left, in front of the flag pole).  Address: [address] [place] [address] [place]. [address]        INSTRUCTIONS IMPORTANT!!!  Do Not Eat, Drink, or Smoke after 12 midnight unless instructed otherwise by your Surgeon. OK to brush teeth, no gum, candy or mints!      *Take Only these medications with a small sip of water Morning of Surgery:  Pantoprazole    Blood Thinners: Stop taking Eliquis 3 days prior to surgery per Physician Instructions! Call your Surgeon office to inquire about any questions regarding your blood thinner medication.    ____  HOLD all vitamins, herbal supplements, aspirin products & NSAIDS 7 days prior to surgery, as these can thin the blood.  ____  NO Acrylic/fake nails or nail polish worn day of surgery (specifically hand/arm & foot surgeries).  ____  NO powder, lotions, deodorants, oils or cream on body.  ____  Remove all jewelry & piercings prior to surgery.  ____  Remove Dentures, Hearing Aids & Contact Lens prior to surgery.  ____  Bring photo ID and insurance information to hospital (Leave Valuables at Home).  ____  If going home the same day, arrange for a ride home. You will not be able to drive for 24 hrs if Anesthesia was used.   ____  Females (ages 11-60): may need to give a urine sample the morning of surgery; please see Pre op Nurse prior to using the restroom.  ____  Males: Stop ED medications (Viagra, Cialis) 24 hrs prior to surgery.  ____  Wear clean, loose fitting clothing to allow for dressings/ bandages.            Diabetic Patients: If you take diabetic medication, do NOT take morning of surgery unless instructed by Doctor. Metformin to be stopped 24 hrs prior to  surgery time. DO NOT take long-acting insulin the evening before surgery. Blood sugars will be checked in pre-op by Nurse.    Bathing Instructions:    -Shower with anti-bacterial Soap (Hibiclens or Dial) the night before surgery and the morning of.   -Do not use Hibiclens on your face or genitals.   -Apply clean clothes after shower.  -Do not shave your face or body 2 days prior to surgery unless instructed otherwise by your Surgeon.  -Do not shave pubic hair 7 days prior to surgery (gyn pt's).    [facility] Visitor/Ride Policy:  Only 2 adults allowed (over the age of 18) to accompany you to the Hospital. You Must have a ride home from a responsible adult that you know and trust. Medical Transport, Uber or Lyft can only be used if patient has a responsible adult to accompany them during ride home.    Discharge Instructions: You will receive Post-op/Discharge instructions by your Discharge Nurse prior to going home. Please call your Surgeon's office with any post-surgery questions/concerns @ [phone].    *If you are running late or have questions the morning of surgery, please call the Surgery Dept @ [phone]  *Insurance/ Financial Questions, please call [phone]    Thank you,  -[facility] Pre Admit Testing Nurse  [phone] or [phone]  M-F 7:30 am-4:30 pm (Closed Major Holidays)

## 2023-03-23 NOTE — OP NOTE
Spinal cord stimulator implant     Preoperative diagnosis: Lumbar radiculopathy [M54.16]  Postlaminectomy syndrome [M96.1]  Chronic pain syndrome [G89.4]     Postoperative diagnosis: Lumbar radiculopathy [M54.16]  Postlaminectomy syndrome [M96.1]  Chronic pain syndrome [G89.4]     Procedure:   1) placement of Octad electrode x2   2) placement of pulse generator     Surgeon: Philipp Demarco MD    Assistant: None    EBL: Minimal     Complications: None     Specimens: None     Anesthesia: MAC     Description of procedure:     After written consent was obtained the patient was brought into the OR and placed in the prone position with all pressure points padded appropriately. The area overlying the skin of the back was prepped and draped in usual sterile fashion using chlorhexidine with an Ioban dressing over the skin. A time out was performed and there was confirmation of preoperative antibiotics.     Fluoroscopy was used to identify the T12/L1 intralaminar space this area was marked and an approximately 6 cm incision was planned in this area. The tract was anesthetized at the level of the skin and deeper tissues leading to the prevertebral fascia with 20 mL of a equal mixture of 0.5% bupivacaine with 1:200,000 epinephrine +1% lidocaine through a 25-gauge needle. This was followed with the skin incision with a 10 blade scalpel, followed by sharp and blunt dissection to the prevertebral fascia using cautery for hemostasis.  A modified Touhy needle was then advanced under fluoroscopy and walked off of the lamina at T12/L1 on each side followed by loss of resistance to air to enter the epidural space. Once the epidural space was entered the octad electrode was advanced under live fluoroscopy in the AP and lateral view to the T8/9 level  in the posterior aspect of the epidural space.. After there was confirmation of the appropriate placement the modified Touhy needles were withdrawn and the stylette were removed from the  electrodes. The electrodes were then anchored into place using an anchor provided by the stimulator company and the anchor was secured to the prevertebral fascia using fixate suture. A pulse generator pocket was created on the right side superior to the iliac crest. An approximately 6 cm incision was planned the area overlying the skin was anesthetized with an additional 10 mL of local anesthetic to a 25 gauge needle. This was followed by incising the skin with a 15 blade scalpel and sharp and blunt dissection to create a pocket approximately the size of the pulse generator for the spinal cord stimulator system approximately 1 inch deep to the skin. Electrocautery was used for hemostasis. The tunneler provided by the company was used to create a tunnel between the midline incision and a pulse generator pocket the electrodes were then threaded through with a stress relief loop being maintained in the midline incision. The electrodes were connected to the battery and the battery was tested so that the entire system was confirmed to be working appropriately. Both sites were irrigated with ancef solution and there was care to confirm hemostasis. The midline incision was then closed with interrupted 2-0 Vicryl followed by 4-0 Monocryl  at the skin. The IPG site was closed with a running subcuticular 4-0 Monocryl.  This was followed by Dermabond after wet and dry cleaning. Dressing was placed over the skin.    The patient was taken to recovery in stable condition and the stimulator was programmed postoperatively to capture all the usual painful areas.     The patient was discharged from the hospital in stable condition.

## 2023-03-23 NOTE — ANESTHESIA PREPROCEDURE EVALUATION
03/23/2023  Jake Ortiz is a 82 y.o., male.    Patient Active Problem List   Diagnosis    Hypertension    Hyperlipemia    CAD (coronary artery disease)    MI, old    S/P CABG x 3    DDD (degenerative disc disease), lumbar    Psoriasis    Rheumatoid factor positive    Multiple joint pain    Hyperuricemia    Mixed restrictive and obstructive lung disease    Granulomatous disease    Arteriosclerosis of aorta    Adenocarcinoma of prostate    Atrial fibrillation with chronic anticoagulation with Eliquis    Nocturnal hypoxemia    Cardiomegaly    Chronic combined systolic and diastolic congestive heart failure    Chronic restrictive lung disease    Gastroesophageal reflux disease    Obstructive sleep apnea    Iron deficiency anemia due to chronic blood loss    Periodic limb movement disorder (PLMD)    Other ascites    Lumbar radiculopathy    VAIBHAV (acute kidney injury)    Spondylosis without myelopathy or radiculopathy, lumbar region    Anemia associated with stage 4 chronic renal failure    Stage 4 chronic kidney disease    Lumbar spondylosis    Biventricular pacemaker check    Pseudopolyposis of colon without complication    Secondary hyperparathyroidism of renal origin    Symptomatic anemia    Elevated troponin    Diabetes mellitus type 2 in obese    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    Presence of cardiac resynchronization therapy pacemaker (CRT-P)    Lumbar radiculopathy, chronic    Elevated MCV    Failed back surgical syndrome    Causalgia of lower extremity       Pre-op Assessment    I have reviewed the Patient Summary Reports.    I have reviewed the NPO Status.   I have reviewed the Medications.     Review of Systems  Anesthesia Hx:  No problems with previous Anesthesia    Social:  Non-Smoker    Hematology/Oncology:         -- Anemia: --  Cancer in past  history:    Cardiovascular:   Hypertension Past MI CAD  CABG/stent Dysrhythmias atrial fibrillation CHF hyperlipidemia ECG has been reviewed.    Pulmonary:   COPD Sleep Apnea, CPAP Mixed restrictive and obstructive lung disease   Renal/:   Chronic Renal Disease, CKD    Hepatic/GI:   GERD    Musculoskeletal:   Arthritis  Rheumatoid factor positive  Multiple joint pain     Neurological:   Neuromuscular Disease,    Endocrine:   Diabetes        Physical Exam  General: Well nourished    Airway:  Mallampati: I   Mouth Opening: Normal  TM Distance: Normal  Neck ROM: Normal ROM    Dental:  Edentulous        Anesthesia Plan  Type of Anesthesia, risks & benefits discussed:    Anesthesia Type: Gen ETT  Intra-op Monitoring Plan: Standard ASA Monitors  Post Op Pain Control Plan: multimodal analgesia  Induction:  IV  Airway Plan: , Post-Induction  Informed Consent: Informed consent signed with the Patient and all parties understand the risks and agree with anesthesia plan.  All questions answered.   ASA Score: 3    Ready For Surgery From Anesthesia Perspective.     .      Chemistry        Component Value Date/Time     02/24/2023 0852    K 4.5 02/24/2023 0852     02/24/2023 0852    CO2 22 (L) 02/24/2023 0852    BUN 40 (H) 02/24/2023 0852    CREATININE 1.7 (H) 02/24/2023 0852     (H) 02/24/2023 0852        Component Value Date/Time    CALCIUM 9.8 02/24/2023 0852    ALKPHOS 74 02/24/2023 0852    AST 25 02/24/2023 0852    ALT 12 02/24/2023 0852    BILITOT 0.3 02/24/2023 0852    ESTGFRAFRICA 50 (A) 07/14/2022 1048    EGFRNONAA 43 (A) 07/14/2022 1048        Lab Results   Component Value Date    WBC 5.53 02/27/2023    HGB 9.7 (L) 02/27/2023    HCT 29.9 (L) 02/27/2023     (H) 02/27/2023     02/27/2023         Program found technically poor ECG   Electronic ventricular pacemaker   When compared with ECG of 16-JUN-2022 09:18,   Vent. rate has decreased BY  12 BPM   Confirmed by FRANCISCA CARROLL, ALVA ([id number]) on  6/16/2022 10:14:12 PM     Echo 4/29/22:   The left ventricle is normal in size with concentric hypertrophy and moderately decreased systolic function.   The estimated ejection fraction is 35%.   Grade III left ventricular diastolic dysfunction.   Mild right ventricular enlargement with moderately reduced right ventricular systolic function.   Mild left atrial enlargement.   Mild right atrial enlargement.   There is mild aortic valve stenosis.   Aortic valve area is 1.53 cm2; peak velocity is 1.78 m/s; mean gradient is 7 mmHg.   Mild mitral regurgitation.   Mild tricuspid regurgitation.   Elevated central venous pressure (15 mmHg).   The estimated PA systolic pressure is 37 mmHg.   There is abnormal septal wall motion. There is systolic and diastolic flattening of the interventricular septum consistent with right ventricle pressure and volume overload.   There is moderate left ventricular global hypokinesis.     Saw [facility]/27/22 for pre op assessment spinal cord stim:    Assessment:      1. Presence of cardiac pacemaker    2. Chronic combined systolic and diastolic congestive heart failure    3. Presence of cardiac resynchronization therapy pacemaker (CRT-P)    4. S/P CABG x 3    5. Arteriosclerosis of aorta    6. Primary hypertension    7. Lumbar radiculopathy, chronic    8. Mixed restrictive and obstructive lung disease    9. Paroxysmal atrial fibrillation    10. Cardiac pacemaker in situ    11. Longstanding persistent atrial fibrillation    12. Obstructive sleep apnea             Plan:   1. CAD s/p CABG x3, old MI  - cont meds - DAPT on hold due to anemia   - elevated trop sec to demand ischemia   - cont Eliquis     2. CHF EF 35%, with TR/MR, improved EF ;  --> 282 -> 222 --> 471 --> 230  --> 507 --> 276  Weight; 219 lbs --> 206  --> 200 lbs  --> 195 lbs  - cont meds lasix 40 BID ; with K/Mg   - needs to take 80mg BID PRN edema  - cont to monitor valve disease  - changed Entresto to Losartan    - cont Aldactone 25mg     3. Chronic Afib   - cont meds - Eliquis   - f/u Dr. Price for Watchman device - pending/on hold      [id number] score: 5  HAS-BLED score: 5     If you answer NO to any of the four criteria below, the patient does not meet the WATCHMAN implant eligibility requirements.      1. Patient has non-valvular atrial fibrillation: Yes  2. Patient has an increased risk for stroke and is recommended for oral anticoagulation (OAC): Yes  3. Patient is suitable for short-term anticoagulation therapy but deemed unable to take long-term OAC: Yes  Patient has an appropriate rationale to seek a non-pharmacological alternative to OACs. Specific factors include: History of bleeding or increased bleeding risk,     CHADSVASC - 5  HASLBED score - 5     4. NASREEN  - cont CPAP     5. Restricive/obstrucive lung disease  - cont tx per PCP/pulm     6. DM2 6.0  --> 6.1 --> 6.3 --> 5.1 --> 4.9  - cont tx per PCP     7. AVB s/p ICD - s/p CRT-P gen change 6/2022  - cont to monitor  - f/u device clinic and EP     8. Anemia sec to GIB, CKD  - f/u with GI and heme/onc; s/p video capsule - neg in past  - s/p PRBC, is on Eliquis 2.5 BID, DAPT on hold.   - cont iron tabs and IV iron infusion   - f/u colorectal sx - may need hemorrhoidal banding sx    9. CKD  - cont to monitor   - f/u nephro      10. Preop CV eval - spinal cord stim on Nevro 11/16/22 with Dr. Demarco  - low CV risk to proceed, ok to hold Eliquis 3 days prior and resume post op.

## 2023-03-23 NOTE — TRANSFER OF CARE
"Anesthesia Transfer of Care Note    Patient: Jake Ortiz    Procedure(s) Performed: Procedure(s) (LRB):  INSERTION, NEUROSTIMULATOR, SPINAL (N/A)    Patient location: PACU    Anesthesia Type: MAC    Transport from OR: Transported from OR on room air with adequate spontaneous ventilation    Post pain: adequate analgesia    Post assessment: no apparent anesthetic complications    Post vital signs: stable    Level of consciousness: responds to stimulation and awake    Nausea/Vomiting: no nausea/vomiting    Complications: none    Transfer of care protocol was followed      Last vitals:   Visit Vitals  BP (!) 113/56 (BP Location: Right arm, Patient Position: Lying)   Pulse 60   Temp 35.9 °C (96.7 °F) (Temporal)   Resp 15   Ht 5' 10" (1.778 m)   Wt 90.7 kg (200 lb)   SpO2 99%   BMI 28.70 kg/m²     "

## 2023-03-23 NOTE — H&P
HPI  Patient presenting for Procedure(s) (LRB):  INSERTION, NEUROSTIMULATOR, SPINAL (N/A)     Patient on Anti-coagulation No    No health changes since previous encounter    Past Medical History:   Diagnosis Date    Abnormal PFT     Anemia     Arthritis     Atrial fibrillation 10/19/2017    Back pain     Congestive heart failure 03/05/2018    Coronary artery disease     DM (diabetes mellitus) 2018     am 06/23/2020    DM (diabetes mellitus) 2016     am 08/17/2021    Hyperlipemia     Hypertension     Myocardial infarction     NASREEN (obstructive sleep apnea) 06/05/2018    Prostate cancer 2015    Tobacco dependence     Type 2 diabetes mellitus      Past Surgical History:   Procedure Laterality Date    COLONOSCOPY N/A 9/22/2020    Procedure: COLONOSCOPY;  Surgeon: Javier Farmer MD;  Location: [facility] ENDO;  Service: Endoscopy;  Laterality: N/A;    CORONARY ARTERY BYPASS GRAFT  1987    EPIDURAL STEROID INJECTION N/A 11/22/2019    Procedure: Lumbar L5/S1 IL XUAN;  Surgeon: Tacho Trivedi MD;  Location: [facility] PAIN MGT;  Service: Pain Management;  Laterality: N/A;    EPIDURAL STEROID INJECTION N/A 1/6/2020    Procedure: Lumbar L5/S1 IL XUAN;  Surgeon: Tacho Trivedi MD;  Location: [facility] PAIN MGT;  Service: Pain Management;  Laterality: N/A;    EPIDURAL STEROID INJECTION N/A 2/10/2020    Procedure: Caudal XUAN;  Surgeon: Tacho Trivedi MD;  Location: [facility] PAIN MGT;  Service: Pain Management;  Laterality: N/A;    ESOPHAGOGASTRODUODENOSCOPY N/A 9/22/2020    Procedure: EGD (ESOPHAGOGASTRODUODENOSCOPY);  Surgeon: Javier Farmer MD;  Location: [facility] ENDO;  Service: Endoscopy;  Laterality: N/A;    INJECTION OF ANESTHETIC AGENT AROUND MEDIAL BRANCH NERVES INNERVATING LUMBAR FACET JOINT Bilateral 10/12/2020    Procedure: Bilateral L3-5 MBB;  Surgeon: Tacho Trivedi MD;  Location: [facility] PAIN MGT;  Service: Pain Management;  Laterality: Bilateral;    INJECTION OF ANESTHETIC AGENT AROUND MEDIAL BRANCH  NERVES INNERVATING LUMBAR FACET JOINT Bilateral 12/21/2020    Procedure: Bilateral L3-5 MBB with steroid;  Surgeon: Tacho Trivedi MD;  Location: [facility] PAIN MGT;  Service: Pain Management;  Laterality: Bilateral;    INTRALUMINAL GASTROINTESTINAL TRACT IMAGING VIA CAPSULE N/A 12/29/2021    Procedure: IMAGING PROCEDURE, GI TRACT, INTRALUMINAL, VIA CAPSULE;  Surgeon: Tahir Geronimo RN;  Location: [facility] ENDO;  Service: Endoscopy;  Laterality: N/A;    PROSTATE SURGERY      prostate radiation    RADIOFREQUENCY THERMOCOAGULATION Left 6/4/2021    Procedure: Left L3-5 Lumbar RFA;  Surgeon: Tacho Trivedi MD;  Location: [facility] PAIN MGT;  Service: Pain Management;  Laterality: Left;    RADIOFREQUENCY THERMOCOAGULATION Right 6/18/2021    Procedure: Right L3-5 Lumbar RFA;  Surgeon: Tacho Trivedi MD;  Location: [facility] PAIN MGT;  Service: Pain Management;  Laterality: Right;    REPLACEMENT OF PACEMAKER GENERATOR Left 6/16/2022    Procedure: REPLACEMENT, PULSE GENERATOR, CARDIAC PACEMAKER/CRT-P device;  Surgeon: Darrel Carrero MD;  Location: [facility] CATH LAB;  Service: Cardiology;  Laterality: Left;  NOT MRI safe   Pacer and leads implanted 9/30/2017, Dr. Souleymane CARROLLT Consulta CRT-P C4TR01, [id number]   A lead: Mdt 5076 CapSureFix® Novus, [id number]   RV lead: Mdt 5076 CapSureFix® Novus, [id number]   LV lead: Mdt 4196 At    SELECTIVE INJECTION OF ANESTHETIC AGENT AROUND LUMBAR SPINAL NERVE ROOT BY TRANSFORAMINAL APPROACH Bilateral 6/8/2022    Procedure: Bilateral L3/4 TF XUAN with RN IV sedation;  Surgeon: Philipp Demarco MD;  Location: [facility] PAIN MGT;  Service: Pain Management;  Laterality: Bilateral;    SPINE SURGERY      fusion    TONSILLECTOMY      TRIAL OF SPINAL CORD NERVE STIMULATOR N/A 1/25/2023    Procedure: Trial, Neurostimulator, Spinal Cord with RN IV sedation;  Surgeon: Philipp Demarco MD;  Location: [facility] PAIN MGT;  Service: Pain Management;  Laterality: N/A;     Review of patient's allergies indicates:  No Known  Allergies     Current Facility-Administered Medications on File Prior to Encounter   Medication Dose Route Frequency Provider Last Rate Last Admin    ondansetron injection 4 mg  4 mg Intravenous Once PRN Tacho Trivedi MD         Current Outpatient Medications on File Prior to Encounter   Medication Sig Dispense Refill    acetaminophen (TYLENOL) 500 MG tablet Take 2 tablets (1,000 mg total) by mouth every 6 (six) hours as needed for Pain.  0    apixaban (ELIQUIS) 2.5 mg Tab Take 1 tablet (2.5 mg total) by mouth 2 (two) times daily. 180 tablet 1    atorvastatin (LIPITOR) 80 MG tablet Take 1 tablet (80 mg total) by mouth every evening. 90 tablet 1    clonazePAM (KLONOPIN) 1 MG tablet Take 1 tablet (1 mg total) by mouth nightly as needed for Anxiety. 90 tablet 0    fluticasone propionate (FLONASE) 50 mcg/actuation nasal spray USE 2 SPRAYS IN EACH NOSTRIL EVERY DAY 48 g 5    furosemide (LASIX) 40 MG tablet Take 1 tablet (40 mg total) by mouth 2 (two) times daily. Can double to 2 tablets twice a day for 3 days for more swelling/fluid build up - take 80mg twice a day 90 tablet 1    krill/om-3/dha/epa/phospho/ast (MEGARED OMEGA-3 KRILL OIL ORAL) Take 1 capsule by mouth every morning.      levocetirizine (XYZAL) 5 MG tablet Take 1 tablet (5 mg total) by mouth every evening. 90 tablet 1    losartan (COZAAR) 50 MG tablet Take 1 tablet (50 mg total) by mouth once daily. 90 tablet 3    magnesium oxide (MAG-OX) 400 mg (241.3 mg magnesium) tablet Take 1 tablet (400 mg total) by mouth once daily. 90 tablet 1    multivitamin capsule Take 1 capsule by mouth once daily.      pantoprazole (PROTONIX) 40 MG tablet Take 1 tablet (40 mg total) by mouth once daily. 90 tablet 3    potassium chloride SA (K-DUR,KLOR-CON) 20 MEQ tablet Take 1 tablet (20 mEq total) by mouth once daily. 90 tablet 1    pregabalin (LYRICA) 50 MG capsule Take 1 capsule (50 mg total) by mouth once daily. (Patient taking differently: Take 50 mg by mouth every  "evening.) 30 capsule 0    pyridoxine, vitamin B6, (B-6) 100 MG Tab Take 50 mg by mouth once daily.      spironolactone (ALDACTONE) 25 MG tablet Take 1 tablet (25 mg total) by mouth once daily. 90 tablet 3    vit A/vit C/vit E/zinc/copper (OCUVITE PRESERVISION ORAL) Take 1 capsule by mouth every evening.      blood sugar diagnostic Strp Check blood glucose levels daily in the AM fasting and 1-2 times more daily. 300 strip 3    cefadroxil (DURICEF) 500 MG Cap Take 1 capsule (500 mg total) by mouth every 12 (twelve) hours. 6 capsule 0    cholecalciferol, vitamin D3, (VITAMIN D3) 25 mcg (1,000 unit) capsule Take 1,000 Units by mouth once daily.      lancets Misc Check blood glucose levels daily in the AM fasting and 1-2 times more daily. 300 each 3        PMHx, PSHx, Allergies, Medications reviewed in epic    ROS negative except pain complaints in HPI    OBJECTIVE:    BP (!) 151/68 (BP Location: Right arm, Patient Position: Lying)   Pulse 61   Temp 97.3 °F (36.3 °C) (Temporal)   Resp 18   Ht 5' 10" (1.778 m)   Wt 90.7 kg (200 lb)   SpO2 97%   BMI 28.70 kg/m²     PHYSICAL EXAMINATION:    GENERAL: Well appearing, in no acute distress, alert and oriented x3.  PSYCH:  Mood and affect appropriate.  SKIN: Skin color, texture, turgor normal, no rashes or lesions which will impact the procedure.  CV: RRR with palpation of the radial artery.  PULM: No evidence of respiratory difficulty, symmetric chest rise. Clear to auscultation.  NEURO: Cranial nerves grossly intact.    Plan:    Proceed with procedure as planned Procedure(s) (LRB):  INSERTION, NEUROSTIMULATOR, SPINAL (N/A)    Philipp Demarco MD  03/23/2023            "

## 2023-03-24 NOTE — TELEPHONE ENCOUNTER
Pt c/o pain over the sensor but otherwise back pain is significantly better. Pt notified she has to wear the waist belt until he follow ups with Dr. Demarco. Pt states he has to adjust it because it keeps sliding.   Follow up scheduled Pt states he feels good normal pain as expected only complaints at this time is with the waist belt being uncomfortable and not sleeping as a result. All questions answered.

## 2023-03-24 NOTE — ANESTHESIA POSTPROCEDURE EVALUATION
Anesthesia Post Evaluation    Patient: Jake Ortiz    Procedure(s) Performed: Procedure(s) (LRB):  INSERTION, NEUROSTIMULATOR, SPINAL (N/A)    Final Anesthesia Type: MAC      Patient location during evaluation: PACU  Patient participation: Yes- Able to Participate  Level of consciousness: awake and alert  Post-procedure vital signs: reviewed and stable  Airway patency: patent      Anesthetic complications: no      Cardiovascular status: blood pressure returned to baseline  Respiratory status: unassisted and spontaneous ventilation  Hydration status: euvolemic  Follow-up not needed.          Vitals Value Taken Time   /68 03/23/23 1411   Temp 36.1 °C (97 °F) 03/23/23 1415   Pulse 64 03/23/23 1415   Resp 20 03/23/23 1415   SpO2 95 % 03/23/23 1415         Event Time   Out of Recovery 14:21:12         Pain/Thomas Score: Pain Rating Prior to Med Admin: 4 (3/23/2023  2:11 PM)  Thomas Score: 9 (3/23/2023  2:15 PM)

## 2023-03-25 NOTE — ASSESSMENT & PLAN NOTE
S/p IV iron x 2 9/2022. Continues on oral iron supplement with adequate iron levels    S/p EGD/Colonoscopy 9/2020 with essentially unremarkable findings. No clear source of bleeding. Patient reports unable to have video capsule in the past due to having pacemaker which is apparently contraindicated with VCE. GI recommends close follow up with lab monitoring.

## 2023-03-25 NOTE — PROGRESS NOTES
Subjective:       Patient ID: Jake Ortiz is a 82 y.o. male.    Chief Complaint:   1. Anemia associated with stage 4 chronic renal failure        2. Stage 4 chronic kidney disease        3. Iron deficiency anemia due to chronic blood loss          Current Treatment:  EPOETIN REAGAN (RETACRIT) WEEKLY 40k units    Oral iron supplementation    Treatment History:  IV Injectafer x 2 doses on 4/7/2022 & 4/14/2022  Retacrit 40,000 units on 3/25/2022    HPI: This is an 82 year old man who initially presented to Hem/Onc in 11/2020 for progressive anemia requiring blood transfusion. Persistent elevation of creatinine, occasional elevation of BUN, and consistently low eGFR indicated he had renal insufficiency and he was diagnosed with anemia secondary to renal failure. He was started on Procrit 20,000 units every 2 weeks and referred to Nephrology.      GI work up done in 9/2020 revealed a colonic polyp and non bleeding internal hemorrhoids; it was recommended he repeat in 5 years. Capsule endoscopy in 12/2021 was unremarkable. SPEP revealed normal total protein and normal pattern. Kappa and lambda light chains were elevated; ratio was WNL.       His primary Hematologist/Oncologist is Dr. Nath.    Interval History: Patient presents for follow up on oral iron and Retacrit; He is scheduled to receive C35D1 today. He presents alone and reports feeling good. He reports having had a pacemaker placed about a year ago; he recently had it checked and was given a good report. He had a stimulator placed in his back last week and states he feels much better and is able to move around more now. He mentions that his nurse with [facility] asked if he had seen his diabetes doctor. He states he has pricked his finger in a while. Per his visit with PCP NP yesterday, his A1C is 4.9. He reports adherence to oral iron with no constipation. Hgb 9.2; will proceed with Retacrit today.     Reviewed labs with patient:   CBC:   Recent Labs   Lab  23  1415   WBC 5.70   RBC 2.85 L   Hemoglobin 9.2 L   Hematocrit 29.5 L   Platelets 294    H   MCH 32.3 H   MCHC 31.2 L     CMP:  Recent Labs   Lab 23  0852   Glucose 130 H   Calcium 9.8   Albumin 4.2   Total Protein 7.7   Sodium 138   Potassium 4.5   CO2 22 L   Chloride 106   BUN 40 H   Creatinine 1.7 H   Alkaline Phosphatase 74   ALT 12   AST 25   Total Bilirubin 0.3     Lab Results   Component Value Date    IRON 102 2023    TRANSFERRIN 217 2023    TIBC 321 2023    FESATURATED 32 2023      Lab Results   Component Value Date    FERRITIN 69 2023     Social History     Socioeconomic History    Marital status:     Number of children: 0   Occupational History    Occupation: retired     Employer: [employer]   Tobacco Use    Smoking status: Former     Packs/day: 1.50     Years: 20.00     Pack years: 30.00     Types: Cigarettes     Start date:      Quit date:      Years since quittin.2    Smokeless tobacco: Never   Substance and Sexual Activity    Alcohol use: No    Drug use: No    Sexual activity: Not Currently     Social Determinants of Health     Financial Resource Strain: Low Risk     Difficulty of Paying Living Expenses: Not hard at all   Food Insecurity: No Food Insecurity    Worried About Running Out of Food in the Last Year: Never true    Ran Out of Food in the Last Year: Never true   Transportation Needs: No Transportation Needs    Lack of Transportation (Medical): No    Lack of Transportation (Non-Medical): No   Physical Activity: Inactive    Days of Exercise per Week: 0 days    Minutes of Exercise per Session: 0 min   Stress: No Stress Concern Present    Feeling of Stress : Only a little   Social Connections: Moderately Integrated    Frequency of Communication with Friends and Family: More than three times a week    Frequency of Social Gatherings with Friends and Family: More than three times a week    Attends [employer] Services: Never     Active Member of Clubs or Organizations: Yes    Attends Club or Organization Meetings: More than 4 times per year    Marital Status:    Housing Stability: High Risk    Unable to Pay for Housing in the Last Year: No    Number of Places Lived in the Last Year: 1    Unstable Housing in the Last Year: Yes     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    #4 PROSTATE BIOPSY, LEFT APEX: ADENOCARCINOMA, FARHAD GRADE 3 + 3 = 6, IN 1 of 2 CORES 9/8/2014    Anemia     Arthritis     Atrial fibrillation 10/19/2017    Back pain     Congestive heart failure 03/05/2018    Coronary artery disease     DM (diabetes mellitus) 2018     am 06/23/2020    DM (diabetes mellitus) 2016     am 08/17/2021    Hyperlipemia     Hypertension     Myocardial infarction     NASREEN (obstructive sleep apnea) 06/05/2018    Prostate cancer 2015    Tobacco dependence     Type 2 diabetes mellitus      Family History   Problem Relation Age of Onset    Heart attack Mother     Diabetes Mother     Heart disease Mother     Cataracts Mother     Stroke Father     Heart disease Father     Heart disease Brother      Past Surgical History:   Procedure Laterality Date    COLONOSCOPY N/A 9/22/2020    Procedure: COLONOSCOPY;  Surgeon: Javier Farmer MD;  Location: [facility] ENDO;  Service: Endoscopy;  Laterality: N/A;    CORONARY ARTERY BYPASS GRAFT  1987    EPIDURAL STEROID INJECTION N/A 11/22/2019    Procedure: Lumbar L5/S1 IL XUAN;  Surgeon: Tacho Trivedi MD;  Location: [facility] PAIN MGT;  Service: Pain Management;  Laterality: N/A;    EPIDURAL STEROID INJECTION N/A 1/6/2020    Procedure: Lumbar L5/S1 IL XUAN;  Surgeon: Tacho Trivedi MD;  Location: [facility] PAIN MGT;  Service: Pain Management;  Laterality: N/A;    EPIDURAL STEROID INJECTION N/A 2/10/2020    Procedure: Caudal XUAN;  Surgeon: Tacho Trivedi MD;  Location: [facility] PAIN MGT;  Service: Pain Management;  Laterality: N/A;     ESOPHAGOGASTRODUODENOSCOPY N/A 9/22/2020    Procedure: EGD (ESOPHAGOGASTRODUODENOSCOPY);  Surgeon: Javier Farmer MD;  Location: [facility] ENDO;  Service: Endoscopy;  Laterality: N/A;    INJECTION OF ANESTHETIC AGENT AROUND MEDIAL BRANCH NERVES INNERVATING LUMBAR FACET JOINT Bilateral 10/12/2020    Procedure: Bilateral L3-5 MBB;  Surgeon: Tacho Trivedi MD;  Location: [facility] PAIN MGT;  Service: Pain Management;  Laterality: Bilateral;    INJECTION OF ANESTHETIC AGENT AROUND MEDIAL BRANCH NERVES INNERVATING LUMBAR FACET JOINT Bilateral 12/21/2020    Procedure: Bilateral L3-5 MBB with steroid;  Surgeon: Tacho Trivedi MD;  Location: [facility] PAIN MGT;  Service: Pain Management;  Laterality: Bilateral;    INSERTION OF SPINAL NEUROSTIMULATOR N/A 3/23/2023    Procedure: INSERTION, NEUROSTIMULATOR, SPINAL;  Surgeon: Philipp Demarco MD;  Location: [facility] OR;  Service: Pain Management;  Laterality: N/A;    INTRALUMINAL GASTROINTESTINAL TRACT IMAGING VIA CAPSULE N/A 12/29/2021    Procedure: IMAGING PROCEDURE, GI TRACT, INTRALUMINAL, VIA CAPSULE;  Surgeon: Tahir Geronimo RN;  Location: [facility] ENDO;  Service: Endoscopy;  Laterality: N/A;    PROSTATE SURGERY      prostate radiation    RADIOFREQUENCY THERMOCOAGULATION Left 6/4/2021    Procedure: Left L3-5 Lumbar RFA;  Surgeon: Tacho Trivedi MD;  Location: [facility] PAIN MGT;  Service: Pain Management;  Laterality: Left;    RADIOFREQUENCY THERMOCOAGULATION Right 6/18/2021    Procedure: Right L3-5 Lumbar RFA;  Surgeon: Tacho Trivedi MD;  Location: [facility] PAIN MGT;  Service: Pain Management;  Laterality: Right;    REPLACEMENT OF PACEMAKER GENERATOR Left 6/16/2022    Procedure: REPLACEMENT, PULSE GENERATOR, CARDIAC PACEMAKER/CRT-P device;  Surgeon: Darrel Carrero MD;  Location: [facility] CATH LAB;  Service: Cardiology;  Laterality: Left;  NOT MRI safe   Pacer and leads implanted 9/30/2017, Dr. Souleymane CARROLLT Consulta CRT-P C4TR01, [id number]   A lead: Mdt 5030 CapSureFix® Novus,  [id number]   RV lead: Jovan 5076 CapSureFix® Novus, [id number]   LV lead: Jovan 4196 At    SELECTIVE INJECTION OF ANESTHETIC AGENT AROUND LUMBAR SPINAL NERVE ROOT BY TRANSFORAMINAL APPROACH Bilateral 6/8/2022    Procedure: Bilateral L3/4 TF XUAN with RN IV sedation;  Surgeon: Philipp Demarco MD;  Location: [facility] PAIN MGT;  Service: Pain Management;  Laterality: Bilateral;    SPINE SURGERY      fusion    TONSILLECTOMY      TRIAL OF SPINAL CORD NERVE STIMULATOR N/A 1/25/2023    Procedure: Trial, Neurostimulator, Spinal Cord with RN IV sedation;  Surgeon: Philipp Demarco MD;  Location: [facility] PAIN MGT;  Service: Pain Management;  Laterality: N/A;     Review of Systems   Constitutional:  Negative for appetite change and fatigue.   HENT:  Negative for mouth sores, rhinorrhea and sore throat.    Eyes: Negative.    Respiratory: Negative.     Cardiovascular: Negative.    Gastrointestinal:  Negative for constipation, diarrhea, nausea and vomiting.   Endocrine: Negative.    Genitourinary: Negative.    Musculoskeletal:  Positive for back pain (relieved by simulator).   Integumentary:  Negative.   Allergic/Immunologic: Negative.    Neurological:  Negative for weakness and numbness.   Hematological: Negative.    Psychiatric/Behavioral: Negative.         Medication List with Changes/Refills   Current Medications    ACETAMINOPHEN (TYLENOL) 500 MG TABLET    Take 2 tablets (1,000 mg total) by mouth every 6 (six) hours as needed for Pain.    APIXABAN (ELIQUIS) 2.5 MG TAB    Take 1 tablet (2.5 mg total) by mouth 2 (two) times daily.    ATORVASTATIN (LIPITOR) 80 MG TABLET    Take 1 tablet (80 mg total) by mouth every evening.    BLOOD SUGAR DIAGNOSTIC STRP    Check blood glucose levels daily in the AM fasting and 1-2 times more daily.    CEFADROXIL (DURICEF) 500 MG CAP    Take 1 capsule (500 mg total) by mouth every 12 (twelve) hours.    CHOLECALCIFEROL, VITAMIN D3, (VITAMIN D3) 25 MCG (1,000 UNIT) CAPSULE    Take 1,000 Units by mouth once  daily.    CLONAZEPAM (KLONOPIN) 1 MG TABLET    Take 1 tablet (1 mg total) by mouth nightly as needed for Anxiety.    FLUTICASONE PROPIONATE (FLONASE) 50 MCG/ACTUATION NASAL SPRAY    USE 2 SPRAYS IN EACH NOSTRIL EVERY DAY    FUROSEMIDE (LASIX) 40 MG TABLET    Take 1 tablet (40 mg total) by mouth 2 (two) times daily. Can double to 2 tablets twice a day for 3 days for more swelling/fluid build up - take 80mg twice a day    KRILL/OM-3/DHA/EPA/PHOSPHO/AST (MEGARED OMEGA-3 KRILL OIL ORAL)    Take 1 capsule by mouth every morning.    LANCETS MISC    Check blood glucose levels daily in the AM fasting and 1-2 times more daily.    LEVOCETIRIZINE (XYZAL) 5 MG TABLET    Take 1 tablet (5 mg total) by mouth every evening.    LOSARTAN (COZAAR) 50 MG TABLET    Take 1 tablet (50 mg total) by mouth once daily.    MAGNESIUM OXIDE (MAG-OX) 400 MG (241.3 MG MAGNESIUM) TABLET    Take 1 tablet (400 mg total) by mouth once daily.    MULTIVITAMIN CAPSULE    Take 1 capsule by mouth once daily. Takes once a week    OXYCODONE-ACETAMINOPHEN (PERCOCET) 5-325 MG PER TABLET    Take 1 tablet by mouth every 6 (six) hours as needed for Pain.    PANTOPRAZOLE (PROTONIX) 40 MG TABLET    Take 1 tablet (40 mg total) by mouth once daily.    POTASSIUM CHLORIDE SA (K-DUR,KLOR-CON) 20 MEQ TABLET    Take 1 tablet (20 mEq total) by mouth once daily.    PREGABALIN (LYRICA) 50 MG CAPSULE    Take 1 capsule (50 mg total) by mouth once daily.    PYRIDOXINE, VITAMIN B6, (B-6) 100 MG TAB    Take 50 mg by mouth once daily.    SPIRONOLACTONE (ALDACTONE) 25 MG TABLET    Take 1 tablet (25 mg total) by mouth once daily.    VIT A/VIT C/VIT E/ZINC/COPPER (OCUVITE PRESERVISION ORAL)    Take 1 capsule by mouth every evening.     Objective:     Vitals:    03/28/23 1437   BP: 119/68   Pulse: 84   Temp: 97 °F (36.1 °C)     Physical Exam  Vitals reviewed.   Constitutional:       Appearance: Normal appearance.   HENT:      Head: Normocephalic.      Mouth/Throat:      Comments:  Wearing mask    Eyes:      Extraocular Movements: Extraocular movements intact.      Pupils: Pupils are equal, round, and reactive to light.   Cardiovascular:      Rate and Rhythm: Normal rate and regular rhythm.      Heart sounds: Normal heart sounds.   Pulmonary:      Effort: Pulmonary effort is normal.      Breath sounds: Normal breath sounds.   Abdominal:      General: Bowel sounds are normal.      Palpations: Abdomen is soft.      Comments: rounded     Genitourinary:     Comments: deferred    Musculoskeletal:         General: Normal range of motion.      Cervical back: Normal range of motion and neck supple.   Skin:     General: Skin is warm and dry.   Neurological:      Mental Status: He is alert and oriented to person, place, and time.   Psychiatric:         Behavior: Behavior normal.         Thought Content: Thought content normal.        (1) Restricted in physically strenuous activity, ambulatory and able to do work of light nature  Assessment:     Problem List Items Addressed This Visit          Renal/    Stage 4 chronic kidney disease       Oncology    Iron deficiency anemia due to chronic blood loss     S/p IV iron x 2 9/2022. Continues on oral iron supplement with adequate iron levels    S/p EGD/Colonoscopy 9/2020 with essentially unremarkable findings. No clear source of bleeding. Patient reports unable to have video capsule in the past due to having pacemaker which is apparently contraindicated with VCE. GI recommends close follow up with lab monitoring.           Anemia associated with stage 4 chronic renal failure - Primary     Currently on Retacrit 40k units.          Plan:     Anemia associated with stage 4 chronic renal failure    Stage 4 chronic kidney disease    Iron deficiency anemia due to chronic blood loss    Labs reviewed.   Continue oral iron supplementation daily.  Ok to proceed with C35D1 of Retacrit today; Hgb 9.2.  Follow up in 4 weeks with CBC and Comprehensive Metabolic Panel  prior to C36D1.  Check iron studies in 5/2023.    Route Chart for Scheduling    Med Onc Chart Routing      Follow up with physician    Follow up with [facility] 4 weeks. at [facility]   Infusion scheduling note    Injection scheduling note Retacrit every 4 weeks   Labs CBC and CMP   Scheduling: Labs same day as infusion  Preferred lab:  Lab interval:  in 4 weeks at [facility]   Imaging None      Pharmacy appointment No pharmacy appointment needed      Other referrals  No additional referrals needed         I will review assessment/plan with collaborating physician.      DOMINGO Diaz

## 2023-03-27 PROBLEM — Z85.46 HISTORY OF PROSTATE CANCER: Status: ACTIVE | Noted: 2017-10-17

## 2023-03-27 PROBLEM — I35.0 AORTIC VALVE STENOSIS: Status: ACTIVE | Noted: 2023-01-01

## 2023-03-27 NOTE — PATIENT INSTRUCTIONS
Counseling and Referral of Other Preventative  (Italic type indicates deductible and co-insurance are waived)    Patient Name: Jake Ortiz  Today's Date: 3/27/2023    Health Maintenance       Date Due Completion Date    Shingles Vaccine (1 of 2) Never done ---    COVID-19 Vaccine (4 - Booster for Pfizer series) 11/26/2021 10/1/2021    Eye Exam 08/17/2022 8/17/2021    Diabetes Urine Screening 03/17/2023 3/17/2022    Hemoglobin A1c 04/14/2023 10/14/2022    Lipid Panel 07/14/2023 7/14/2022    TETANUS VACCINE 02/10/2025 2/10/2015        No orders of the defined types were placed in this encounter.      The following information is provided to all patients.  This information is to help you find resources for any of the problems found today that may be affecting your health:                Living healthy guide: [email or web address].[email or web address].[facility]      Understanding Diabetes: www.diabetes.org      Eating healthy: www.cdc.gov/healthyweight      [facility] home safety checklist: www.cdc.gov/steadi/patient.html      Agency on Aging: www.goea.[place].[facility]      Alcoholics anonymous (AA): www.aa.org      Physical Activity: www.zaid.nih.gov/xx3oqlb      Tobacco use: www.quitwithusla.org

## 2023-03-27 NOTE — PROGRESS NOTES
"The patient location is: [place]  The chief complaint leading to consultation is: annual wellness visit.    Visit type: audiovisual    Face to Face time with patient: 53 minutes  60 minutes, approx, of total time spent on the encounter, which includes face to face time and non-face to face time preparing to see the patient (eg, review of tests), Obtaining and/or reviewing separately obtained history, Documenting clinical information in the electronic or other health record, Independently interpreting results (not separately reported) and communicating results to the patient/family/caregiver, or Care coordination (not separately reported).         Each patient to whom he or she provides medical services by telemedicine is:  (1) informed of the relationship between the physician and patient and the respective role of any other health care provider with respect to management of the patient; and (2) notified that he or she may decline to receive medical services by telemedicine and may withdraw from such care at any time.    Notes:       Jake Ortiz presented for a  Medicare AWV and comprehensive Health Risk Assessment today. The following components were reviewed and updated:    Medical history  Family History  Social history  Allergies and Current Medications  Health Risk Assessment  Health Maintenance  Care Team         ** See Completed Assessments for Annual Wellness Visit within the encounter summary.**         The following assessments were completed:  Living Situation  CAGE  Depression Screening  Fall Risk Assessment (MACH 10)  Hearing Assessment(HHI)  Cognitive Function Screening  Nutrition Screening  ADL Screening  PAQ Screening      Vitals:    03/27/23 1444   BP: 128/69   Pulse: 63   SpO2: 98%   Weight: 92.1 kg (203 lb)   Height: 5' 9" (1.753 m)     Body mass index is 29.98 kg/m².  Physical Exam  Vitals and nursing note reviewed.   Constitutional:       Appearance: He is well-developed.   HENT:      Head: " Normocephalic.   Pulmonary:      Effort: Pulmonary effort is normal. No respiratory distress.   Neurological:      Mental Status: He is alert and oriented to person, place, and time.      Motor: No abnormal muscle tone.   Psychiatric:         Speech: Speech normal.         Behavior: Behavior normal.             Diagnoses and health risks identified today and associated recommendations/orders:    1. Encounter for preventive health examination     Review for Opioid Screening: Pt does not have Rx for Opioids      Review for Substance Use Disorders: Patient does not use substance  per chart     Reports he is at his respiratory baseline.   Reports cardiac conditions are stable.    Encouraged healthy diet and exercise as tolerated     Scheduled  Optometry   Message sent to nephrology staff to please contact to schedule.     He will discuss urine screening with PCP  Discussed receiving Shingrix at pharmacy.    Discussed locations to receive COVID booster     Abnormal cognitive function screening.    Advised to follow up with PCP for further evaluation and recommendations. Patient expressed understanding.      2. Mixed restrictive and obstructive lung disease  Stable. Continue current treatment plan as previously prescribed with your  pulmonologist.     3. Granulomatous disease  Ct 12/14  Stable. Continue current treatment plan as previously prescribed with your  pcp     4. Coronary artery disease of native artery of native heart with stable angina pectoris  Stable. Continue current treatment plan as previously prescribed with your  cardiologist.     5. Atrial fibrillation, unspecified type  Continue current treatment plan as previously prescribed with your  cardiologist and EP.     6. Chronic combined systolic and diastolic congestive heart failure  Stable. Continue current treatment plan as previously prescribed with your  cardiologist.     7. Atherosclerosis of aorta  Xray 9/19  Stable. Continue current treatment plan as  previously prescribed with your  cardiologist.     8. Diabetes mellitus with stage 3 chronic kidney disease  A1c 4.9  Ckd-stable  Continue current treatment plan as previously prescribed with your  pcp and nephrologist.     9. Secondary hyperparathyroidism of renal origin  Advised to follow up with nephrologist for further evaluation and recommendations. Patient expressed understanding.      10. Hyperlipidemia associated with type 2 diabetes mellitus  Stable and controlled. Continue current treatment plan as previously prescribed with your cardiologist.     11. Aortic valve stenosis, etiology of cardiac valve disease unspecified  Echo 4/22  Continue current treatment plan as previously prescribed with your  cardiologist.     12. NASREEN (obstructive sleep apnea)  Discussed risks associated with untreated sleep apnea  Advised to follow up with pulmonologist for further evaluation and recommendations. Patient expressed understanding.      13. Anemia, unspecified type  Continue current treatment plan as previously prescribed with your  hematologist    14. Psoriasis  Continue current treatment plan as previously prescribed with your  pcp     15. History of prostate cancer  Continue current treatment plan as previously prescribed with your  providers.     Provided Jake with a 5-10 year written screening schedule and personal prevention plan. Recommendations were developed using the USPSTF age appropriate recommendations. Education, counseling, and referrals were provided as needed. After Visit Summary, available on [facility], which includes a list of additional screenings\tests needed.    Follow up in about 1 year (around 3/27/2024) for awv.    Perri Coreas NP  I offered to discuss advanced care planning, including how to pick a person who would make decisions for you if you were unable to make them for yourself, called a health care power of , and what kind of decisions you might make such as use of life  sustaining treatments such as ventilators and tube feeding when faced with a life limiting illness recorded on a living will that they will need to know. (How you want to be cared for as you near the end of your natural life)     X  Patient has advanced directives written and agrees to provide copies to the institution.

## 2023-03-28 NOTE — TELEPHONE ENCOUNTER
----- Message from Perri Coreas NP sent at 3/27/2023  2:41 PM CDT -----  Pt would like a call back to discuss scheduling an apt.  Thanks,   Perri     
Scheduled appt and lab 3/28/23/sf   
Alert and oriented, no focal deficits, no motor or sensory deficits.

## 2023-03-28 NOTE — DISCHARGE INSTRUCTIONS
.[facility]  [address] [address]  [address]  [phone] phone     [phone] fax  Hours of Operation: Monday- Friday 8:00am- 5:00pm  After hours phone  [phone]  Hematology / Oncology Physicians on call    Dr. Portillo Barrera      Nurse Practitioners:    Natalie Ying, NEGIN Virgen, NEGIN Mckay, NEGIN Manzo, NEGIN Hatfield, CARMELINA Melvin      Please don't hesitate to call if you have any concerns.

## 2023-03-28 NOTE — PROGRESS NOTES
Established Patient Chronic Pain Note     Referring Physician: No ref. provider found    PCP: Byron Stevens MD    Chief Complaint:   LBP     SUBJECTIVE:  Interval Hx: 3/29/23  Patient presents status post spinal cord stimulator implant 03/23/2023, postop day 5.    Patient reports 100% relief in lower back pain following spinal cord stimulator implantation.  Patient reports some mild tenderness along the lower lumbar midline and IPG incision sites.  Incision sites are clean, dry intact with wound edges approximated.  Patient has completed antibiotic regimen postop.  He reports he has noticed a significant improvement in his balance and in everyday activities with walking and performing activities of daily living.  He reports he drove 2 days following his implant and feels a regained sense of independence.  Patient becomes sentimental and reports as he ages and develops a closer relationship with God, he tries to help family and friends who have lost family members and is able to help  more people secondary to being without pain.  Patient is grateful for his pain relief.    Interval History (03/29/2023):  Patient returns for 2nd follow-up after spinal cord stimulator trial on 01/25/2023.  Patient reports 85% relief in lower back pain as well as functional improvement in his ability to move around a 1000 perform normal activities.  Patient denies any systemic symptoms.  He denies any purulence or drainage from the site.  Pain is currently rated a 0 out 10. Denies any fevers, chills, changes in gait, weakness, or bowel and bladder incontinence      Interval Hx: 2/1/23  Patient presents status post spinal cord stimulator trial 01/25/2023 postop day 7.  Patient reports initial 100% relief in lower back pain at the beginning of the trial.  Patient reports progressing through programs #1 through #4, with reduction in pain relief.  Patient reports 100% relief on program 3.  Patient's wife in the room reports she believes  he overall appeared to be in less pain, standing and ambulating more.  Patient reports he noticed almost immediate improvement in posture, no pain with sitting, which is typically when his pain is the most severe.  He does report some paresthesias in the left leg which were not present before the trial.  Patient denies any significant lower extremity weakness, bowel or bladder incontinence.            Interval Hx: 12/29/22  Presents for five-month follow-up.  Today he reports overall he has been doing well after pacemaker surgery with Dr. Carrero, but continues to report significant lower back pain.  Today pain is rated a 6/10.  Patient has been taking 3-6 extra-strength Tylenol daily with marginal improvement in pain, takes the edge off.   To reiterate, patient has undergone multiple epidural steroid injections, medial branch blocks, radiofrequency ablation, physical therapy and failed treatment with anti-inflammatory and membrane stabilizing agents without significant relief.  Patient would like to pursue spinal cord stimulator trial.    Interval History (7/7/2022): Jake Ortiz presents today for follow-up visit.  he underwent Bilateral L3/4 TF XUAN on 06/08/2022.  The patient reports that he is/was unchanged following the procedure.  he reports 0% pain relief. Discussed SCS trial at previous visit, patient has read over brochures and spoken with friends/family that have an SCS and is interested in pursuing this procedure. Recently underwent pacemaker replacement on 06/16/2022.  Patient reports pain as 7/10 today. Reports continued low back pain without radiation. Reports last night he took 3 extra strength tylenol which temporarily mitigated his pain. Patient has undergone several injections including ESIs with varying levels and approaches, MBBs, RFAs, all with limited relief.      Interval history 05/10/2022  Patient presents for six-month follow-up.  He continues to report lower back pain at the level of  L3-4 which radiates anteriorly.  Patient reports pain is exacerbated with prolonged standing and with ambulation.  Patient is interested in pursuing intervention.  Today patient denies significant lower extremity radiculopathy.  Again patient reiterates no significant relief following prior lumbar medial branch blocks or radiofrequency ablation.  Patient has read spinal cord stimulation literature and would like to continue with transforaminal injection at this time.    Interval history 11/17/2020  Patient presents for CT lumbar spine review.  Today patient reports pain has been relatively well controlled.  Patient reports he was dancing earlier today.  When pain is severe patient reports pain in the lower back which radiates down the right lower extremity along the lateral distribution to the calf.  Patient denies significant weakness in the lower extremities or new onset bowel or bladder incontinence or gait ataxia.  Patient reviewed spinal cord stimulation educational material and would like to pursue pharmacologic management prior to trialing this.    HPI:  09/22/2021  Jake Ortiz is a 82 y.o. male with past medical history significant for chronic restrictive lung disease, lumbar spondylosis with radiculopathy status post L2-5 laminectomy (complicated by staph infection; [facility], 10 years prior), hypertension, hyperlipidemia, coronary artery disease status post myocardial infarctions status post triple-vessel CABG, atrial fibrillation, congestive heart failure status post percutaneous pacemaker placement, stage 4 chronic kidney disease, prostate adenocarcinoma, type 2 diabetes, GERD, obstructive sleep apnea, multi joint arthralgia who presents to the clinic for the evaluation of lower back and leg pain.  Patient reports longstanding history of lower back pain which is constant with radiation down the right lower extremity in L4-5 distribution to the calf.  Patient denies any radicular symptoms on the  left.  Patient denies any significant weakness in bilateral lower extremities.  Pain is described as aching in nature and is a 7/10 today.  Pain at its worst is 10/10.  Pain is exacerbated with prolonged standing and with ambulation as well as with crossing his legs.  Patient is able to ambulate approximately 100 ft before requiring rest.  Pain is improved with lumbar flexion, stretching.  Patient has received multiple prior interventions including medial branch block, radiofrequency ablation, epidural and caudal epidural steroid injection without meaningful relief.    Of note patient last saw Dr. Trivedi December 16, 2020 with recommendation for medial branch block and consideration of radiofrequency ablation.    Patient denies night fever/night sweats, urinary incontinence, bowel incontinence, significant weight loss, significant motor weakness and loss of sensations.    Pain Disability Index Review:     Last 3 PDI Scores 3/29/2023 9/6/2022 7/6/2022   Pain Disability Index (PDI) 16 49 15       Non-Pharmacologic Treatments:  Physical Therapy/Home Exercise: yes  Ice/Heat:yes  TENS: no  Acupuncture: no  Massage: no  Chiropractic: no    Other: no      Pain Medications:  - Adjuvant Medications: Clonazepam (Klonopin) and Tylenol (Acetaminophen)  - Anti-Coagulants: Aspirin, Plavix ( Clopidogrel) and Eliquis    Pain Procedures:   Surgeries:  Lumbar surgery with Dr. Powers with complications with staph infection causing 2 subsequent surgeries  Injections:  -01/25/2023:  Spinal cord stimulator trial with Linda, 85% relief with functional improvement  -June 4, 2021:  Left L3-5 lumbar radiofrequency ablation  -December 21, 2020:  Bilateral L3-5 medial branch block with steroid  - series of 3 injections (what sounds like ESIs) about 30 years ago with relief  - Lumbar Medial Branch Blocks and Lumbar Radiofrequency Ablation with limited relief  - L5/S1 IL XUAN on 11/22/19 with some pain relief for a few days  - L5/S1 IL XUAN on  1/6/2020 with limited pain relief for a few days  - caudal XUAN on 2/10/20 with 15% pain relief x 2 days  -10/12/2020 bilateral L3-5 medial branch blocks, 50% relief    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    #4 PROSTATE BIOPSY, LEFT APEX: ADENOCARCINOMA, FARHAD GRADE 3 + 3 = 6, IN 1 of 2 CORES 9/8/2014    Anemia     Arthritis     Atrial fibrillation 10/19/2017    Back pain     Congestive heart failure 03/05/2018    Coronary artery disease     DM (diabetes mellitus) 2018     am 06/23/2020    DM (diabetes mellitus) 2016     am 08/17/2021    Hyperlipemia     Hypertension     Myocardial infarction     NASREEN (obstructive sleep apnea) 06/05/2018    Prostate cancer 2015    Tobacco dependence     Type 2 diabetes mellitus      Past Surgical History:   Procedure Laterality Date    COLONOSCOPY N/A 9/22/2020    Procedure: COLONOSCOPY;  Surgeon: Javier Farmer MD;  Location: [facility];  Service: Endoscopy;  Laterality: N/A;    CORONARY ARTERY BYPASS GRAFT  1987    EPIDURAL STEROID INJECTION N/A 11/22/2019    Procedure: Lumbar L5/S1 IL XUAN;  Surgeon: Tacho Trivedi MD;  Location: [facility] PAIN MGT;  Service: Pain Management;  Laterality: N/A;    EPIDURAL STEROID INJECTION N/A 1/6/2020    Procedure: Lumbar L5/S1 IL XUAN;  Surgeon: Tacho Trivedi MD;  Location: [facility] PAIN MGT;  Service: Pain Management;  Laterality: N/A;    EPIDURAL STEROID INJECTION N/A 2/10/2020    Procedure: Caudal XUAN;  Surgeon: Tacho Trivedi MD;  Location: [facility] PAIN MGT;  Service: Pain Management;  Laterality: N/A;    ESOPHAGOGASTRODUODENOSCOPY N/A 9/22/2020    Procedure: EGD (ESOPHAGOGASTRODUODENOSCOPY);  Surgeon: Javier Farmer MD;  Location: [facility] ENDO;  Service: Endoscopy;  Laterality: N/A;    INJECTION OF ANESTHETIC AGENT AROUND MEDIAL BRANCH NERVES INNERVATING LUMBAR FACET JOINT Bilateral 10/12/2020    Procedure: Bilateral L3-5 MBB;  Surgeon: Tacho Trivedi  MD;  Location: [facility] PAIN MGT;  Service: Pain Management;  Laterality: Bilateral;    INJECTION OF ANESTHETIC AGENT AROUND MEDIAL BRANCH NERVES INNERVATING LUMBAR FACET JOINT Bilateral 12/21/2020    Procedure: Bilateral L3-5 MBB with steroid;  Surgeon: Tacho Trivedi MD;  Location: [facility] PAIN MGT;  Service: Pain Management;  Laterality: Bilateral;    INSERTION OF SPINAL NEUROSTIMULATOR N/A 3/23/2023    Procedure: INSERTION, NEUROSTIMULATOR, SPINAL;  Surgeon: Philipp Demarco MD;  Location: [facility] OR;  Service: Pain Management;  Laterality: N/A;    INTRALUMINAL GASTROINTESTINAL TRACT IMAGING VIA CAPSULE N/A 12/29/2021    Procedure: IMAGING PROCEDURE, GI TRACT, INTRALUMINAL, VIA CAPSULE;  Surgeon: Tahir Geronimo RN;  Location: [facility] ENDO;  Service: Endoscopy;  Laterality: N/A;    PROSTATE SURGERY      prostate radiation    RADIOFREQUENCY THERMOCOAGULATION Left 6/4/2021    Procedure: Left L3-5 Lumbar RFA;  Surgeon: Tacho Trivedi MD;  Location: [facility] PAIN MGT;  Service: Pain Management;  Laterality: Left;    RADIOFREQUENCY THERMOCOAGULATION Right 6/18/2021    Procedure: Right L3-5 Lumbar RFA;  Surgeon: Tacho Trivedi MD;  Location: [facility] PAIN MGT;  Service: Pain Management;  Laterality: Right;    REPLACEMENT OF PACEMAKER GENERATOR Left 6/16/2022    Procedure: REPLACEMENT, PULSE GENERATOR, CARDIAC PACEMAKER/CRT-P device;  Surgeon: Darrel Carrero MD;  Location: [facility] CATH LAB;  Service: Cardiology;  Laterality: Left;  NOT MRI safe   Pacer and leads implanted 9/30/2017, Dr. Souleymane CARROLLT Consulta CRT-P C4TR01, [id number]   A lead: Mdt 5023 CapSureFix® Novus, [id number]   RV lead: Mdt 5073 CapSureFix® Novus, [id number]   LV lead: Jovan 4196 At    SELECTIVE INJECTION OF ANESTHETIC AGENT AROUND LUMBAR SPINAL NERVE ROOT BY TRANSFORAMINAL APPROACH Bilateral 6/8/2022    Procedure: Bilateral L3/4 TF XUAN with RN IV sedation;  Surgeon: Philipp Demarco MD;  Location: [facility] PAIN MGT;  Service: Pain Management;  Laterality: Bilateral;     SPINE SURGERY      fusion    TONSILLECTOMY      TRIAL OF SPINAL CORD NERVE STIMULATOR N/A 1/25/2023    Procedure: Trial, Neurostimulator, Spinal Cord with RN IV sedation;  Surgeon: Philipp Demarco MD;  Location: [facility];  Service: Pain Management;  Laterality: N/A;     Review of patient's allergies indicates:  No Known Allergies    Current Outpatient Medications   Medication Sig    acetaminophen (TYLENOL) 500 MG tablet Take 2 tablets (1,000 mg total) by mouth every 6 (six) hours as needed for Pain.    apixaban (ELIQUIS) 2.5 mg Tab Take 1 tablet (2.5 mg total) by mouth 2 (two) times daily.    atorvastatin (LIPITOR) 80 MG tablet Take 1 tablet (80 mg total) by mouth every evening.    blood sugar diagnostic Strp Check blood glucose levels daily in the AM fasting and 1-2 times more daily.    cefadroxil (DURICEF) 500 MG Cap Take 1 capsule (500 mg total) by mouth every 12 (twelve) hours.    cholecalciferol, vitamin D3, (VITAMIN D3) 25 mcg (1,000 unit) capsule Take 1,000 Units by mouth once daily.    clonazePAM (KLONOPIN) 1 MG tablet Take 1 tablet (1 mg total) by mouth nightly as needed for Anxiety.    fluticasone propionate (FLONASE) 50 mcg/actuation nasal spray USE 2 SPRAYS IN EACH NOSTRIL EVERY DAY    furosemide (LASIX) 40 MG tablet Take 1 tablet (40 mg total) by mouth 2 (two) times daily. Can double to 2 tablets twice a day for 3 days for more swelling/fluid build up - take 80mg twice a day    krill/om-3/dha/epa/phospho/ast (MEGARED OMEGA-3 KRILL OIL ORAL) Take 1 capsule by mouth every morning.    lancets Misc Check blood glucose levels daily in the AM fasting and 1-2 times more daily.    levocetirizine (XYZAL) 5 MG tablet Take 1 tablet (5 mg total) by mouth every evening.    losartan (COZAAR) 50 MG tablet Take 1 tablet (50 mg total) by mouth once daily.    magnesium oxide (MAG-OX) 400 mg (241.3 mg magnesium) tablet Take 1 tablet (400 mg total) by mouth once daily.    multivitamin capsule Take 1 capsule by mouth  "once daily. Takes once a week    oxyCODONE-acetaminophen (PERCOCET) 5-325 mg per tablet Take 1 tablet by mouth every 6 (six) hours as needed for Pain.    pantoprazole (PROTONIX) 40 MG tablet Take 1 tablet (40 mg total) by mouth once daily.    potassium chloride SA (K-DUR,KLOR-CON) 20 MEQ tablet Take 1 tablet (20 mEq total) by mouth once daily.    pyridoxine, vitamin B6, (B-6) 100 MG Tab Take 50 mg by mouth once daily.    spironolactone (ALDACTONE) 25 MG tablet Take 1 tablet (25 mg total) by mouth once daily.    vit A/vit C/vit E/zinc/copper (OCUVITE PRESERVISION ORAL) Take 1 capsule by mouth every evening.    pregabalin (LYRICA) 50 MG capsule Take 1 capsule (50 mg total) by mouth once daily. (Patient taking differently: Take 50 mg by mouth every evening.)     No current facility-administered medications for this visit.     Facility-Administered Medications Ordered in Other Visits   Medication    ondansetron injection 4 mg       Review of Systems     GENERAL:  No weight loss, malaise or fevers.  HEENT:   No recent changes in vision or hearing  NECK:  Negative for lumps, no difficulty with swallowing.  RESPIRATORY:  Negative for cough, wheezing or shortness of breath, patient denies any recent URI.  CARDIOVASCULAR:  Negative for chest pain, leg swelling or palpitations.  GI:  Negative for abdominal discomfort, blood in stools or black stools or change in bowel habits.  MUSCULOSKELETAL:  See HPI.  SKIN:  Negative for lesions, rash, and itching.  PSYCH:  No mood disorder or recent psychosocial stressors.   HEMATOLOGY/LYMPHOLOGY:  Negative for prolonged bleeding, bruising easily or swollen nodes.    NEURO:   No history of headaches, syncope, paralysis, seizures or tremors.  All other reviewed and negative other than HPI.    OBJECTIVE:    /75   Pulse 82   Resp 17   Ht 5' 10" (1.778 m)   Wt 94 kg (207 lb 3.7 oz)   BMI 29.73 kg/m²       Physical Exam    GENERAL: Well appearing, in no acute distress, alert and " oriented x3.  PSYCH:  Mood and affect appropriate.  SKIN: Skin color, texture, turgor normal, no rashes or lesions.  HEAD/FACE:  Normocephalic, atraumatic. Cranial nerves grossly intact.    CV: RRR with palpation of the radial artery.  PULM: No evidence of respiratory difficulty, symmetric chest rise.  GI:  Soft and non-tender.    BACK: Straight leg raising in the sitting and supine positions is negative to radicular pain. No pain to palpation over the facet joints of the lumbar spine or spinous processes. Normal range of motion without pain reproduction.  Lower lumbar midline vertical scar and IPG site:  Wound edges approximated and fully closed  clean dry and intact with no signs of purulence or drainage.  Overlying Tegaderm dressings removed with ease.  Steri-Strips placed overlying lower lumbar midline incision  EXTREMITIES: Peripheral joint ROM is full and pain free without obvious instability or laxity in all four extremities. No deformities, edema, or skin discoloration. Good capillary refill.  MUSCULOSKELETAL: Able to stand on heels & toes.   Shoulder, hip, and knee provocative maneuvers are negative.  There is no pain with palpation over the sacroiliac joints bilaterally.  FABERs test is negative.  FAER test is negative bilaterally.   Bilateral upper and lower extremity strength is normal and symmetric.  No atrophy or tone abnormalities are noted.  RIGHT Lower extremity: Hip flexion 5/5, Hip Abduction 5/5, Hip Adduction 5/5, Knee extension 5/5, Knee flexion 5/5, Ankle dorsiflexion5/5, Extensor hallucis longus 5/5, Ankle plantarflexion 5/5  LEFT Lower extremity:  Hip flexion 5/5, Hip Abduction 5/5,Hip Adduction 5/5, Knee extension 5/5, Knee flexion 5/5, Ankle dorsiflexion 5/5, Extensor hallucis longus 5/5, Ankle plantarflexion 5/5  -Normaltesting knee (patellar) jerk and ankle (achilles) jerk    NEURO: Bilateral upper and lower extremity coordination and muscle stretch reflexes are physiologic and symmetric.  No loss of sensation is noted.  GAIT: normal.    Imaging:   CT lumbar spine 09/22/2021  FINDINGS:  Right total nephrectomy again noted.  Advanced atherosclerotic calcification.     Levoconvex lower lumbar scoliotic curvature secondary to asymmetric disc height loss on the right at L3-4 and L4-5.     T12-L1: Severe disc height loss with endplate sclerosis, vacuum disc phenomenon, and anterior osteophyte formation.  No spinal canal stenosis.  No significant right neural foraminal stenosis.  Moderate left neural foraminal stenosis.     L1-2: No significant disc height loss.  Hypertrophic facet arthropathy.     L2-3: Chronic ankylosis of L2-3.  Moderate right and mild left neural foraminal stenosis.  No spinal canal stenosis.  Ankylosis of posterior elements also noted.     L3-4: Severe disc height loss with endplate sclerosis and osteophyte formation.  Severe right and moderate to severe left neural foraminal stenosis.  Severe hypertrophic facet arthropathy.     L4-5: Laminectomy changes noted. Leftward chronic subluxation of L4 relative to L3 and L5 with right worse than left disc height loss with endplate sclerosis and osteophyte formation.  Right-sided L4 vertebral height loss.  Severe right and mild left neural foraminal stenosis.  Severe hypertrophic facet arthropathy.     L5-S1: No significant disc height loss.  Mild left neural foraminal stenosis.  Severe hypertrophic facet arthropathy.     Impression:   Severe chronic multilevel discogenic degenerative change and facet arthropathy of the lumbar spine as described above.       09/25/19    X-Ray Lumbar Spine Complete 5 View    FINDINGS:  Scoliosis remains.  Vertebral body heights and alignment are stable.  Multilevel advanced degenerative disc height loss and osteophyte formation noted.  Multilevel facet arthropathy present more prevalent at the lower lumbar spine.  No acute osseous abnormality appreciated.  Aorta iliac atherosclerotic vascular calcifications  noted.    08/26/14    X-Ray Cervical Spine AP And Lateral    Narrative  Findings: Vertebral alignment is normal.  There is narrowing of the disk spaces and  anterior osteophyte formation C 3-7.  There are no compression fractures or acute abnormalities are seen.  Carotid calcifications are noted bilaterally.    ASSESSMENT: 82 y.o. year old male with lower back and right leg pain, consistent with     1. Failed back surgical syndrome        2. Causalgia of lower extremity, unspecified laterality        3. Lumbar radiculopathy                  PLAN:   - Interventions:   -Neurophys evaluation: Ms. Beasley 08/16/22  -CT thoracic spine: 07/06/22  -Cardiovascular clearance: 10/27/22  : Low cardiovascular risk, hold Eliquis 3 prior  -CBC/CMP: 12/22/22  -Nevro spinal cord stimulator trial: 1/25-2/3/23 with 85% relief  -Nevro spinal cord stimulator implant 03/23/2023 with 100% relief in lower back and leg pain.  Patient reports improvement in range of motion, functionality, quality of life and happiness.    - Anticoagulation use: yes  Eliquis  -HF, EF 35%, PPM  -Dr. Hood     report:  Reviewed and consistent with medication use as prescribed.      - Medications:  -continue Lyrica to see if this better controls neuropathic pain.  We discussed potential side effects of this medication which may include drowsiness,dizziness, dry mouth, constipation or peripheral edema.  -225 mg twice daily      -We have discussed continuing judicious use of Tylenol, not to exceed 3000 mg daily to avoid acute hepatotoxicity.    - Therapy:   We discussed continuing physical therapy to help manage the patient/s painful condition. The patient was counseled that muscle strengthening will improve the long term prognosis in regards to pain and may also help increase range of motion and mobility.     - Imaging: Reviewed available imaging with patient and answered any questions they had regarding study     - Follow up visit: return to clinic in  1 mo      The above plan and management options were discussed at length with patient. Patient is in agreement with the above and verbalized understanding.    - I discussed the goals of interventional chronic pain management with the patient on today's visit. We discussed a multimodal and systematic approach to pain.  This includes diagnostic and therapeutic injections, adjuvant pharmacologic treatment, physical therapy, and at times psychiatry.  I emphasized the importance of regular exercise, core strengthening and stretching, diet and weight loss as a cornerstone of long-term pain management.    - This condition does not require this patient to take time off of work, and the primary goal of our Pain Management services is to improve the patient's functional capacity.  - Patient Questions: Answered all of the patient's questions regarding diagnoses, therapy, treatment and next steps        Philipp Demarco MD  Interventional Pain Management  [facility]    Disclaimer:  This note was prepared using voice recognition system and is likely to have sound alike errors that may have been overlooked even after proof reading.  Please call me with any questions

## 2023-04-14 NOTE — PROGRESS NOTES
Subjective:       Patient ID: Jake Ortiz is a 82 y.o. male.    Chief Complaint: Follow-up      HPI Comments:       Current Outpatient Medications:     acetaminophen (TYLENOL) 500 MG tablet, Take 2 tablets (1,000 mg total) by mouth every 6 (six) hours as needed for Pain., Disp: , Rfl: 0    apixaban (ELIQUIS) 2.5 mg Tab, Take 1 tablet (2.5 mg total) by mouth 2 (two) times daily., Disp: 180 tablet, Rfl: 1    atorvastatin (LIPITOR) 80 MG tablet, Take 1 tablet (80 mg total) by mouth every evening., Disp: 90 tablet, Rfl: 1    blood sugar diagnostic Strp, Check blood glucose levels daily in the AM fasting and 1-2 times more daily., Disp: 300 strip, Rfl: 3    cefadroxil (DURICEF) 500 MG Cap, Take 1 capsule (500 mg total) by mouth every 12 (twelve) hours., Disp: 6 capsule, Rfl: 0    cholecalciferol, vitamin D3, (VITAMIN D3) 25 mcg (1,000 unit) capsule, Take 1,000 Units by mouth once daily., Disp: , Rfl:     clonazePAM (KLONOPIN) 1 MG tablet, Take 1 tablet (1 mg total) by mouth nightly as needed for Anxiety., Disp: 90 tablet, Rfl: 0    fluticasone propionate (FLONASE) 50 mcg/actuation nasal spray, 2 sprays (100 mcg total) by Each Nostril route once daily., Disp: 48 g, Rfl: 5    furosemide (LASIX) 40 MG tablet, Take 1 tablet (40 mg total) by mouth 2 (two) times daily. Can double to 2 tablets twice a day for 3 days for more swelling/fluid build up - take 80mg twice a day, Disp: 90 tablet, Rfl: 1    krill/om-3/dha/epa/phospho/ast (MEGARED OMEGA-3 KRILL OIL ORAL), Take 1 capsule by mouth every morning., Disp: , Rfl:     lancets Misc, Check blood glucose levels daily in the AM fasting and 1-2 times more daily., Disp: 300 each, Rfl: 3    levocetirizine (XYZAL) 5 MG tablet, Take 1 tablet (5 mg total) by mouth every evening., Disp: 90 tablet, Rfl: 1    losartan (COZAAR) 50 MG tablet, Take 1 tablet (50 mg total) by mouth once daily., Disp: 90 tablet, Rfl: 3    multivitamin capsule, Take 1 capsule by mouth once daily. Takes once a  week, Disp: , Rfl:     pantoprazole (PROTONIX) 40 MG tablet, Take 1 tablet (40 mg total) by mouth once daily., Disp: 90 tablet, Rfl: 3    potassium chloride SA (K-DUR,KLOR-CON) 20 MEQ tablet, Take 1 tablet (20 mEq total) by mouth once daily., Disp: 90 tablet, Rfl: 1    pyridoxine, vitamin B6, (B-6) 100 MG Tab, Take 50 mg by mouth once daily., Disp: , Rfl:     spironolactone (ALDACTONE) 25 MG tablet, Take 1 tablet (25 mg total) by mouth once daily., Disp: 90 tablet, Rfl: 3    vit A/vit C/vit E/zinc/copper (OCUVITE PRESERVISION ORAL), Take 1 capsule by mouth every evening., Disp: , Rfl:     pregabalin (LYRICA) 50 MG capsule, Take 1 capsule (50 mg total) by mouth once daily. (Patient taking differently: Take 50 mg by mouth every evening.), Disp: 30 capsule, Rfl: 0  No current facility-administered medications for this visit.    Facility-Administered Medications Ordered in Other Visits:     ondansetron injection 4 mg, 4 mg, Intravenous, Once PRN, Tacho Trivedi MD      Six-month follow-up.  He is doing so much better now that his spinal stimulator is helping his back pain.  Also the diarrhea seemed to have stopped once he stop taking magnesium supplements.  Not taking any opioids for back pain.  On pregabalin 50 mg a day    Does not use CPAP at all for sleep apnea.  Says he is feels good.  Somewhat groggy this morning but says he sleeps well and has a lot of at energy during the day.      Does not follow with a urologist anymore for his prostate cancer history.    Sees Pain Medicine, Cardiology, Nephrology all in May    Review of Systems   Constitutional:  Negative for activity change, appetite change and fever.   HENT:  Negative for sore throat.    Respiratory:  Negative for cough and shortness of breath.    Cardiovascular:  Negative for chest pain.   Gastrointestinal:  Negative for abdominal pain, diarrhea and nausea.   Genitourinary:  Negative for difficulty urinating.   Musculoskeletal:  Negative for arthralgias  "and myalgias.   Neurological:  Negative for dizziness and headaches.     Objective:      Vitals:    04/14/23 0812   BP: 122/64   Pulse: 79   Temp: 97.7 °F (36.5 °C)   SpO2: 96%   Weight: 93.6 kg (206 lb 5.6 oz)   Height: 5' 10" (1.778 m)   PainSc: 0-No pain     Physical Exam  Vitals and nursing note reviewed.   Constitutional:       General: He is not in acute distress.     Appearance: He is well-developed. He is not diaphoretic.      Comments: Groggy but alert   HENT:      Head: Normocephalic.   Neck:      Thyroid: No thyromegaly.   Cardiovascular:      Rate and Rhythm: Normal rate and regular rhythm.      Heart sounds: Normal heart sounds. No murmur heard.  Pulmonary:      Effort: Pulmonary effort is normal.      Breath sounds: Normal breath sounds. No wheezing or rales.   Abdominal:      General: There is no distension.      Palpations: Abdomen is soft.   Musculoskeletal:      Cervical back: Neck supple.      Right lower leg: No edema.      Left lower leg: No edema.   Lymphadenopathy:      Cervical: No cervical adenopathy.   Skin:     General: Skin is warm and dry.   Neurological:      Mental Status: He is alert and oriented to person, place, and time.   Psychiatric:         Mood and Affect: Mood normal.         Behavior: Behavior normal.         Thought Content: Thought content normal.         Judgment: Judgment normal.       Assessment:       1. Type 2 diabetes mellitus without complication, without long-term current use of insulin    2. 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 Mixed hyperlipidemia    4. DDD (degenerative disc disease), lumbar    5. Coronary artery disease of native artery of native heart with stable angina pectoris    6. Longstanding persistent atrial fibrillation    7. Obstructive sleep apnea    8. Chronic combined systolic and diastolic congestive heart failure    9. Pseudopolyposis of colon without complication, unspecified part of colon    10. 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 Pregabalin    Coronary artery disease of native artery of native heart with stable angina pectoris  Comments:  no ASA due to bleeding much better control with spinal stimulator    Longstanding persistent atrial fibrillation  Comments:  on eliquis    Obstructive sleep apnea  Comments:  no CPAP    Chronic combined systolic and diastolic congestive heart failure  Comments:  on lasix    Pseudopolyposis of colon without complication, unspecified part of colon  Comments:  next colon 2025    Primary hypertension  Comments:  Controlled.  Follow-up 6 months

## 2023-04-25 NOTE — PROGRESS NOTES
Subjective:       Patient ID: Jake Ortiz is a 82 y.o. male.    Chief Complaint: f/u anemia    HPI: 82 y.o male with CKD, anemia, iron deficiency presenting today for follow up of his anemia of CKD managed with Procrit PRN to maintain hg near 10 range. Patient has known h/o iron deficiency with unremarkable EGD/Colonoscopy 2020. VCE 2021 normal. Denies anemia symptoms. Reports chronic intermittent bright red bleeding in stools. Prior occult stool testing negative. Prior Colonoscopy did show hemorrhoids.     Today he feels well and is without complaints. Denies noting abnormal bleeding or anemia symptoms.       Social History     Socioeconomic History    Marital status:     Number of children: 0   Occupational History    Occupation: retired     Employer: [employer]   Tobacco Use    Smoking status: Former     Packs/day: 1.50     Years: 20.00     Pack years: 30.00     Types: Cigarettes     Start date:      Quit date:      Years since quittin.3    Smokeless tobacco: Never   Substance and Sexual Activity    Alcohol use: No    Drug use: No    Sexual activity: Not Currently     Social Determinants of Health     Financial Resource Strain: Low Risk     Difficulty of Paying Living Expenses: Not hard at all   Food Insecurity: No Food Insecurity    Worried About Running Out of Food in the Last Year: Never true    Ran Out of Food in the Last Year: Never true   Transportation Needs: No Transportation Needs    Lack of Transportation (Medical): No    Lack of Transportation (Non-Medical): No   Physical Activity: Insufficiently Active    Days of Exercise per Week: 3 days    Minutes of Exercise per Session: 10 min   Stress: No Stress Concern Present    Feeling of Stress : Not at all   Social Connections: Moderately Integrated    Frequency of Communication with Friends and Family: More than three times a week    Frequency of Social Gatherings with Friends and Family: More than three times a week     Attends Anglican Services: Never    Active Member of Clubs or Organizations: Yes    Attends Club or Organization Meetings: More than 4 times per year    Marital Status:    Housing Stability: Low Risk     Unable to Pay for Housing in the Last Year: No    Number of Places Lived in the Last Year: 1    Unstable Housing in the Last Year: No       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    #4 PROSTATE BIOPSY, LEFT APEX: ADENOCARCINOMA, FARHAD GRADE 3 + 3 = 6, IN 1 of 2 CORES 9/8/2014    Anemia     Arthritis     Atrial fibrillation 10/19/2017    Back pain     Congestive heart failure 03/05/2018    Coronary artery disease     DM (diabetes mellitus) 2018     am 06/23/2020    DM (diabetes mellitus) 2016     am 08/17/2021    Hyperlipemia     Hypertension     Myocardial infarction     NASREEN (obstructive sleep apnea) 06/05/2018    Prostate cancer 2015    Tobacco dependence     Type 2 diabetes mellitus        Family History   Problem Relation Age of Onset    Heart attack Mother     Diabetes Mother     Heart disease Mother     Cataracts Mother     Stroke Father     Heart disease Father     Heart disease Brother        Past Surgical History:   Procedure Laterality Date    COLONOSCOPY N/A 9/22/2020    Procedure: COLONOSCOPY;  Surgeon: Javier Farmer MD;  Location: [facility] ENDO;  Service: Endoscopy;  Laterality: N/A;    CORONARY ARTERY BYPASS GRAFT  1987    EPIDURAL STEROID INJECTION N/A 11/22/2019    Procedure: Lumbar L5/S1 IL XUAN;  Surgeon: Tacho Trivedi MD;  Location: [facility] PAIN MGT;  Service: Pain Management;  Laterality: N/A;    EPIDURAL STEROID INJECTION N/A 1/6/2020    Procedure: Lumbar L5/S1 IL XUAN;  Surgeon: Tacho Trivedi MD;  Location: [facility] PAIN MGT;  Service: Pain Management;  Laterality: N/A;    EPIDURAL STEROID INJECTION N/A 2/10/2020    Procedure: Caudal XUAN;  Surgeon: Tacho Trivedi MD;  Location: [facility] PAIN MGT;  Service: Pain  Management;  Laterality: N/A;    ESOPHAGOGASTRODUODENOSCOPY N/A 9/22/2020    Procedure: EGD (ESOPHAGOGASTRODUODENOSCOPY);  Surgeon: Javier Farmer MD;  Location: [facility] ENDO;  Service: Endoscopy;  Laterality: N/A;    INJECTION OF ANESTHETIC AGENT AROUND MEDIAL BRANCH NERVES INNERVATING LUMBAR FACET JOINT Bilateral 10/12/2020    Procedure: Bilateral L3-5 MBB;  Surgeon: Tacho Trivedi MD;  Location: [facility] PAIN MGT;  Service: Pain Management;  Laterality: Bilateral;    INJECTION OF ANESTHETIC AGENT AROUND MEDIAL BRANCH NERVES INNERVATING LUMBAR FACET JOINT Bilateral 12/21/2020    Procedure: Bilateral L3-5 MBB with steroid;  Surgeon: Tacho Trivedi MD;  Location: [facility] PAIN MGT;  Service: Pain Management;  Laterality: Bilateral;    INSERTION OF SPINAL NEUROSTIMULATOR N/A 3/23/2023    Procedure: INSERTION, NEUROSTIMULATOR, SPINAL;  Surgeon: Philipp Demarco MD;  Location: [facility] OR;  Service: Pain Management;  Laterality: N/A;    INTRALUMINAL GASTROINTESTINAL TRACT IMAGING VIA CAPSULE N/A 12/29/2021    Procedure: IMAGING PROCEDURE, GI TRACT, INTRALUMINAL, VIA CAPSULE;  Surgeon: Tahir Geronimo RN;  Location: [facility] ENDO;  Service: Endoscopy;  Laterality: N/A;    PROSTATE SURGERY      prostate radiation    RADIOFREQUENCY THERMOCOAGULATION Left 6/4/2021    Procedure: Left L3-5 Lumbar RFA;  Surgeon: Tacho Trivedi MD;  Location: [facility] PAIN MGT;  Service: Pain Management;  Laterality: Left;    RADIOFREQUENCY THERMOCOAGULATION Right 6/18/2021    Procedure: Right L3-5 Lumbar RFA;  Surgeon: Tacho Trivedi MD;  Location: [facility] PAIN MGT;  Service: Pain Management;  Laterality: Right;    REPLACEMENT OF PACEMAKER GENERATOR Left 6/16/2022    Procedure: REPLACEMENT, PULSE GENERATOR, CARDIAC PACEMAKER/CRT-P device;  Surgeon: Darrel Carrero MD;  Location: [facility] CATH LAB;  Service: Cardiology;  Laterality: Left;  NOT MRI safe   Pacer and leads implanted 9/30/2017, Dr. Souleymane CARROLLT Consulta CRT-P C4TR01, [id number]   A  lead: Mdt 5076 CapSureFix® Novus, [id number]   RV lead: Mdt 5076 CapSureFix® Novus, [id number]   LV lead: Jovan 4196 At    SELECTIVE INJECTION OF ANESTHETIC AGENT AROUND LUMBAR SPINAL NERVE ROOT BY TRANSFORAMINAL APPROACH Bilateral 6/8/2022    Procedure: Bilateral L3/4 TF XUAN with RN IV sedation;  Surgeon: Philipp Demarco MD;  Location: [facility] PAIN MGT;  Service: Pain Management;  Laterality: Bilateral;    SPINE SURGERY      fusion    TONSILLECTOMY      TRIAL OF SPINAL CORD NERVE STIMULATOR N/A 1/25/2023    Procedure: Trial, Neurostimulator, Spinal Cord with RN IV sedation;  Surgeon: Philipp Demarco MD;  Location: [facility] PAIN MGT;  Service: Pain Management;  Laterality: N/A;       Review of Systems   Constitutional:  Negative for activity change, appetite change, chills, fatigue, fever and unexpected weight change.   HENT:  Negative for congestion, mouth sores, nosebleeds, sore throat, trouble swallowing and voice change.    Eyes:  Negative for visual disturbance.   Respiratory:  Negative for cough, chest tightness, shortness of breath and wheezing.    Cardiovascular:  Negative for chest pain and leg swelling.   Gastrointestinal:  Negative for abdominal distention, abdominal pain, blood in stool, constipation, diarrhea, nausea and vomiting.   Genitourinary:  Negative for difficulty urinating, dysuria and hematuria.   Musculoskeletal:  Negative for arthralgias, back pain and myalgias.   Skin:  Negative for pallor, rash and wound.   Neurological:  Negative for dizziness, syncope, weakness and headaches.   Hematological:  Negative for adenopathy. Does not bruise/bleed easily.   Psychiatric/Behavioral:  The patient is not nervous/anxious.    All other systems reviewed and are negative.      Medication List with Changes/Refills   Current Medications    ACETAMINOPHEN (TYLENOL) 500 MG TABLET    Take 2 tablets (1,000 mg total) by mouth every 6 (six) hours as needed for Pain.    APIXABAN (ELIQUIS) 2.5 MG TAB    Take 1 tablet (2.5 mg  total) by mouth 2 (two) times daily.    ATORVASTATIN (LIPITOR) 80 MG TABLET    Take 1 tablet (80 mg total) by mouth every evening.    BLOOD SUGAR DIAGNOSTIC STRP    Check blood glucose levels daily in the AM fasting and 1-2 times more daily.    CEFADROXIL (DURICEF) 500 MG CAP    Take 1 capsule (500 mg total) by mouth every 12 (twelve) hours.    CHOLECALCIFEROL, VITAMIN D3, (VITAMIN D3) 25 MCG (1,000 UNIT) CAPSULE    Take 1,000 Units by mouth once daily.    CLONAZEPAM (KLONOPIN) 1 MG TABLET    Take 1 tablet (1 mg total) by mouth nightly as needed for Anxiety.    FLUTICASONE PROPIONATE (FLONASE) 50 MCG/ACTUATION NASAL SPRAY    2 sprays (100 mcg total) by Each Nostril route once daily.    FUROSEMIDE (LASIX) 40 MG TABLET    Take 1 tablet (40 mg total) by mouth 2 (two) times daily. Can double to 2 tablets twice a day for 3 days for more swelling/fluid build up - take 80mg twice a day    KRILL/OM-3/DHA/EPA/PHOSPHO/AST (MEGARED OMEGA-3 KRILL OIL ORAL)    Take 1 capsule by mouth every morning.    LANCETS MISC    Check blood glucose levels daily in the AM fasting and 1-2 times more daily.    LEVOCETIRIZINE (XYZAL) 5 MG TABLET    Take 1 tablet (5 mg total) by mouth every evening.    LOSARTAN (COZAAR) 50 MG TABLET    Take 1 tablet (50 mg total) by mouth once daily.    MULTIVITAMIN CAPSULE    Take 1 capsule by mouth once daily. Takes once a week    PANTOPRAZOLE (PROTONIX) 40 MG TABLET    Take 1 tablet (40 mg total) by mouth once daily.    POTASSIUM CHLORIDE SA (K-DUR,KLOR-CON) 20 MEQ TABLET    Take 1 tablet (20 mEq total) by mouth once daily.    PREGABALIN (LYRICA) 50 MG CAPSULE    Take 1 capsule (50 mg total) by mouth once daily.    PYRIDOXINE, VITAMIN B6, (B-6) 100 MG TAB    Take 50 mg by mouth once daily.    SPIRONOLACTONE (ALDACTONE) 25 MG TABLET    Take 1 tablet (25 mg total) by mouth once daily.    VIT A/VIT C/VIT E/ZINC/COPPER (OCUVITE PRESERVISION ORAL)    Take 1 capsule by mouth every evening.     Objective:      Vitals:    04/25/23 1457   BP: 113/62   Pulse: 88   Temp: 97.3 °F (36.3 °C)     Lab Results   Component Value Date    WBC 4.86 04/25/2023    HGB 9.3 (L) 04/25/2023    HCT 29.5 (L) 04/25/2023     (H) 04/25/2023     04/25/2023       BMP  Lab Results   Component Value Date     04/25/2023    K 4.9 04/25/2023     04/25/2023    CO2 24 04/25/2023    BUN 65 (H) 04/25/2023    CREATININE 2.0 (H) 04/25/2023    CALCIUM 9.3 04/25/2023    ANIONGAP 10 04/25/2023    ESTGFRAFRICA 50 (A) 07/14/2022    EGFRNONAA 43 (A) 07/14/2022     Lab Results   Component Value Date    ALT 12 04/25/2023    AST 21 04/25/2023    GGT 58 (H) 08/08/2018    ALKPHOS 101 04/25/2023    BILITOT 0.3 04/25/2023     Lab Results   Component Value Date    IRON 102 02/24/2023    TIBC 321 02/24/2023    FERRITIN 69 02/24/2023         Physical Exam  Vitals reviewed.   Constitutional:       Appearance: He is well-developed.   HENT:      Head: Normocephalic.      Right Ear: External ear normal.      Left Ear: External ear normal.      Nose: Nose normal.   Eyes:      General: Lids are normal. No scleral icterus.        Right eye: No discharge.         Left eye: No discharge.      Conjunctiva/sclera: Conjunctivae normal.   Neck:      Thyroid: No thyroid mass.   Cardiovascular:      Rate and Rhythm: Normal rate and regular rhythm.      Heart sounds: Normal heart sounds. No murmur heard.  Pulmonary:      Effort: Pulmonary effort is normal. No respiratory distress.      Breath sounds: Normal breath sounds. No wheezing or rales.   Abdominal:      General: Bowel sounds are normal. There is no distension.      Palpations: Abdomen is soft.      Tenderness: There is no abdominal tenderness.   Genitourinary:     Comments: deferred  Musculoskeletal:         General: Normal range of motion.      Cervical back: Normal range of motion.   Skin:     General: Skin is warm and dry.   Neurological:      Mental Status: He is alert and oriented to person, place,  and time.   Psychiatric:         Speech: Speech normal.         Behavior: Behavior normal. Behavior is cooperative.         Thought Content: Thought content normal.        Assessment:     Problem List Items Addressed This Visit          Oncology    History of prostate cancer;Adenocarcinoma of prostate     Most recent PSA wnl 8/2022. Repeat at follow up           Relevant Orders    CBC Auto Differential    Comprehensive Metabolic Panel    PROSTATE SPECIFIC ANTIGEN, DIAGNOSTIC    Iron and TIBC    Ferritin    Iron deficiency anemia due to chronic blood loss     Most recent iron levels 2/2023 normal    Repeat iron studies at follow up in 4 weeks           Relevant Orders    CBC Auto Differential    Comprehensive Metabolic Panel    PROSTATE SPECIFIC ANTIGEN, DIAGNOSTIC    Iron and TIBC    Ferritin    Anemia associated with stage 4 chronic renal failure - Primary     Hg 9.3    -Retacrit 40K units today. Repeat in 2 weeks  -f/u 1 month with cbc, iron, ferritin. Retacrit PRN           Relevant Orders    CBC Auto Differential    Iron and TIBC    Ferritin    CBC Auto Differential    Comprehensive Metabolic Panel    PROSTATE SPECIFIC ANTIGEN, DIAGNOSTIC    Iron and TIBC    Ferritin           Plan:     Anemia associated with stage 4 chronic renal failure  -     CBC Auto Differential; Future; Expected date: 04/25/2023  -     Iron and TIBC; Future; Expected date: 04/25/2023  -     Ferritin; Future; Expected date: 04/25/2023  -     CBC Auto Differential; Future; Expected date: 04/25/2023  -     Comprehensive Metabolic Panel; Future; Expected date: 04/25/2023  -     PROSTATE SPECIFIC ANTIGEN, DIAGNOSTIC; Future; Expected date: 04/25/2023  -     Iron and TIBC; Future; Expected date: 04/25/2023  -     Ferritin; Future; Expected date: 04/25/2023    Iron deficiency anemia due to chronic blood loss  -     CBC Auto Differential; Future; Expected date: 04/25/2023  -     Comprehensive Metabolic Panel; Future; Expected date: 04/25/2023  -      PROSTATE SPECIFIC ANTIGEN, DIAGNOSTIC; Future; Expected date: 04/25/2023  -     Iron and TIBC; Future; Expected date: 04/25/2023  -     Ferritin; Future; Expected date: 04/25/2023    History of prostate cancer;Adenocarcinoma of prostate  -     CBC Auto Differential; Future; Expected date: 04/25/2023  -     Comprehensive Metabolic Panel; Future; Expected date: 04/25/2023  -     PROSTATE SPECIFIC ANTIGEN, DIAGNOSTIC; Future; Expected date: 04/25/2023  -     Iron and TIBC; Future; Expected date: 04/25/2023  -     Ferritin; Future; Expected date: 04/25/2023          Med Onc Chart Routing      Follow up with physician    Follow up with [facility] 4 weeks.   Infusion scheduling note    Injection scheduling note retacrit in 2 weeks   Labs CBC, iron and TIBC and ferritin   Scheduling:  Preferred lab:  Lab interval:     Imaging None      Pharmacy appointment No pharmacy appointment needed      Other referrals  No additional referrals needed             MEI ShearerP-C

## 2023-04-25 NOTE — ASSESSMENT & PLAN NOTE
Hg 9.3    -Retacrit 40K units today. Repeat in 2 weeks  -f/u 1 month with cbc, iron, ferritin. Retacrit PRN

## 2023-04-27 NOTE — PROGRESS NOTES
Established Patient Chronic Pain Note     Referring Physician: No ref. provider found    PCP: Byron Stevens MD    Chief Complaint:   LBP     SUBJECTIVE:  Interval history 05/01/2023  Patient presents status post spinal cord stimulator implant 03/23/2023. Today patient reports ineffective relief for the last week.  Patient reports that his remote has been beeping incessantly with inappropriate function.  Patient reports he is able to charge spinal cord stimulator device but not able to turn it on or select programs.  Of note device representative, Dodie Stephenson work with the patient this past Friday and reports [employer] engineering team is looking into this problem.  Today patient reports pain which is constant which is rated an 8/10.  Patient reports pain in a bandlike distribution in the lower back.  Today he denies more distal radiculopathy into the lower extremities or feet.  He is continued Lyrica 50 mg at night with mild improvement in his pain.  He reports over the last week no recent falls, surgeries or inciting factors to cause device malfunction.  He denies significant lower extremity weakness, bowel or bladder incontinence or saddle anesthesia.      Interval Hx: 3/29/23  Patient presents status post spinal cord stimulator implant 03/23/2023, postop day 5.    Patient reports 100% relief in lower back pain following spinal cord stimulator implantation.  Patient reports some mild tenderness along the lower lumbar midline and IPG incision sites.  Incision sites are clean, dry intact with wound edges approximated.  Patient has completed antibiotic regimen postop.  He reports he has noticed a significant improvement in his balance and in everyday activities with walking and performing activities of daily living.  He reports he drove 2 days following his implant and feels a regained sense of independence.  Patient becomes sentimental and reports as he ages and develops a closer relationship with God, he tries to  help family and friends who have lost family members and is able to help  more people secondary to being without pain.  Patient is grateful for his pain relief.    Interval History (05/01/2023):  Patient returns for 2nd follow-up after spinal cord stimulator trial on 01/25/2023.  Patient reports 85% relief in lower back pain as well as functional improvement in his ability to move around a 1000 perform normal activities.  Patient denies any systemic symptoms.  He denies any purulence or drainage from the site.  Pain is currently rated a 0 out 10. Denies any fevers, chills, changes in gait, weakness, or bowel and bladder incontinence      Interval Hx: 2/1/23  Patient presents status post spinal cord stimulator trial 01/25/2023 postop day 7.  Patient reports initial 100% relief in lower back pain at the beginning of the trial.  Patient reports progressing through programs #1 through #4, with reduction in pain relief.  Patient reports 100% relief on program 3.  Patient's wife in the room reports she believes he overall appeared to be in less pain, standing and ambulating more.  Patient reports he noticed almost immediate improvement in posture, no pain with sitting, which is typically when his pain is the most severe.  He does report some paresthesias in the left leg which were not present before the trial.  Patient denies any significant lower extremity weakness, bowel or bladder incontinence.            Interval Hx: 12/29/22  Presents for five-month follow-up.  Today he reports overall he has been doing well after pacemaker surgery with Dr. Carrero, but continues to report significant lower back pain.  Today pain is rated a 6/10.  Patient has been taking 3-6 extra-strength Tylenol daily with marginal improvement in pain, takes the edge off.   To reiterate, patient has undergone multiple epidural steroid injections, medial branch blocks, radiofrequency ablation, physical therapy and failed treatment with  anti-inflammatory and membrane stabilizing agents without significant relief.  Patient would like to pursue spinal cord stimulator trial.    Interval History (7/7/2022): Jake Ortiz presents today for follow-up visit.  he underwent Bilateral L3/4 TF XUAN on 06/08/2022.  The patient reports that he is/was unchanged following the procedure.  he reports 0% pain relief. Discussed SCS trial at previous visit, patient has read over brochures and spoken with friends/family that have an SCS and is interested in pursuing this procedure. Recently underwent pacemaker replacement on 06/16/2022.  Patient reports pain as 7/10 today. Reports continued low back pain without radiation. Reports last night he took 3 extra strength tylenol which temporarily mitigated his pain. Patient has undergone several injections including ESIs with varying levels and approaches, MBBs, RFAs, all with limited relief.      Interval history 05/10/2022  Patient presents for six-month follow-up.  He continues to report lower back pain at the level of L3-4 which radiates anteriorly.  Patient reports pain is exacerbated with prolonged standing and with ambulation.  Patient is interested in pursuing intervention.  Today patient denies significant lower extremity radiculopathy.  Again patient reiterates no significant relief following prior lumbar medial branch blocks or radiofrequency ablation.  Patient has read spinal cord stimulation literature and would like to continue with transforaminal injection at this time.    Interval history 11/17/2020  Patient presents for CT lumbar spine review.  Today patient reports pain has been relatively well controlled.  Patient reports he was dancing earlier today.  When pain is severe patient reports pain in the lower back which radiates down the right lower extremity along the lateral distribution to the calf.  Patient denies significant weakness in the lower extremities or new onset bowel or bladder incontinence or  gait ataxia.  Patient reviewed spinal cord stimulation educational material and would like to pursue pharmacologic management prior to trialing this.    HPI:  09/22/2021  Jake Ortiz is a 82 y.o. male with past medical history significant for chronic restrictive lung disease, lumbar spondylosis with radiculopathy status post L2-5 laminectomy (complicated by staph infection; [facility], 10 years prior), hypertension, hyperlipidemia, coronary artery disease status post myocardial infarctions status post triple-vessel CABG, atrial fibrillation, congestive heart failure status post percutaneous pacemaker placement, stage 4 chronic kidney disease, prostate adenocarcinoma, type 2 diabetes, GERD, obstructive sleep apnea, multi joint arthralgia who presents to the clinic for the evaluation of lower back and leg pain.  Patient reports longstanding history of lower back pain which is constant with radiation down the right lower extremity in L4-5 distribution to the calf.  Patient denies any radicular symptoms on the left.  Patient denies any significant weakness in bilateral lower extremities.  Pain is described as aching in nature and is a 7/10 today.  Pain at its worst is 10/10.  Pain is exacerbated with prolonged standing and with ambulation as well as with crossing his legs.  Patient is able to ambulate approximately 100 ft before requiring rest.  Pain is improved with lumbar flexion, stretching.  Patient has received multiple prior interventions including medial branch block, radiofrequency ablation, epidural and caudal epidural steroid injection without meaningful relief.    Of note patient last saw Dr. Trivedi December 16, 2020 with recommendation for medial branch block and consideration of radiofrequency ablation.    Patient denies night fever/night sweats, urinary incontinence, bowel incontinence, significant weight loss, significant motor weakness and loss of sensations.    Pain Disability Index Review:      Last 3 PDI Scores 5/1/2023 3/29/2023 9/6/2022   Pain Disability Index (PDI) 43 16 49       Non-Pharmacologic Treatments:  Physical Therapy/Home Exercise: yes  Ice/Heat:yes  TENS: no  Acupuncture: no  Massage: no  Chiropractic: no    Other: no      Pain Medications:  - Adjuvant Medications: Clonazepam (Klonopin) and Tylenol (Acetaminophen)  - Anti-Coagulants: Aspirin, Plavix ( Clopidogrel) and Eliquis    Pain Procedures:   Surgeries:  Lumbar surgery with Dr. Powers with complications with staph infection causing 2 subsequent surgeries  Injections:  -01/25/2023:  Spinal cord stimulator trial with Nevro, 85% relief with functional improvement  -June 4, 2021:  Left L3-5 lumbar radiofrequency ablation  -December 21, 2020:  Bilateral L3-5 medial branch block with steroid  - series of 3 injections (what sounds like ESIs) about 30 years ago with relief  - Lumbar Medial Branch Blocks and Lumbar Radiofrequency Ablation with limited relief  - L5/S1 IL XUAN on 11/22/19 with some pain relief for a few days  - L5/S1 IL XUAN on 1/6/2020 with limited pain relief for a few days  - caudal XUAN on 2/10/20 with 15% pain relief x 2 days  -10/12/2020 bilateral L3-5 medial branch blocks, 50% relief    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    #4 PROSTATE BIOPSY, LEFT APEX: ADENOCARCINOMA, FARHAD GRADE 3 + 3 = 6, IN 1 of 2 CORES 9/8/2014    Anemia     Arthritis     Atrial fibrillation 10/19/2017    Back pain     Congestive heart failure 03/05/2018    Coronary artery disease     DM (diabetes mellitus) 2018     am 06/23/2020    DM (diabetes mellitus) 2016     am 08/17/2021    Hyperlipemia     Hypertension     Myocardial infarction     NASREEN (obstructive sleep apnea) 06/05/2018    Prostate cancer 2015    Tobacco dependence     Type 2 diabetes mellitus      Past Surgical History:   Procedure Laterality Date    COLONOSCOPY N/A 9/22/2020    Procedure: COLONOSCOPY;  Surgeon:  Javier Famrer MD;  Location: [facility] ENDO;  Service: Endoscopy;  Laterality: N/A;    CORONARY ARTERY BYPASS GRAFT  1987    EPIDURAL STEROID INJECTION N/A 11/22/2019    Procedure: Lumbar L5/S1 IL XUAN;  Surgeon: Tacho Trivedi MD;  Location: [facility] PAIN MGT;  Service: Pain Management;  Laterality: N/A;    EPIDURAL STEROID INJECTION N/A 1/6/2020    Procedure: Lumbar L5/S1 IL XUAN;  Surgeon: Tacho Trivedi MD;  Location: [facility] PAIN MGT;  Service: Pain Management;  Laterality: N/A;    EPIDURAL STEROID INJECTION N/A 2/10/2020    Procedure: Caudal XUAN;  Surgeon: Tacho Trivedi MD;  Location: [facility] PAIN MGT;  Service: Pain Management;  Laterality: N/A;    ESOPHAGOGASTRODUODENOSCOPY N/A 9/22/2020    Procedure: EGD (ESOPHAGOGASTRODUODENOSCOPY);  Surgeon: Javier Farmer MD;  Location: [facility] ENDO;  Service: Endoscopy;  Laterality: N/A;    INJECTION OF ANESTHETIC AGENT AROUND MEDIAL BRANCH NERVES INNERVATING LUMBAR FACET JOINT Bilateral 10/12/2020    Procedure: Bilateral L3-5 MBB;  Surgeon: Tacho Trivedi MD;  Location: [facility] PAIN MGT;  Service: Pain Management;  Laterality: Bilateral;    INJECTION OF ANESTHETIC AGENT AROUND MEDIAL BRANCH NERVES INNERVATING LUMBAR FACET JOINT Bilateral 12/21/2020    Procedure: Bilateral L3-5 MBB with steroid;  Surgeon: Tacho Trivedi MD;  Location: [facility] PAIN MGT;  Service: Pain Management;  Laterality: Bilateral;    INSERTION OF SPINAL NEUROSTIMULATOR N/A 3/23/2023    Procedure: INSERTION, NEUROSTIMULATOR, SPINAL;  Surgeon: Philipp Demarco MD;  Location: [facility] OR;  Service: Pain Management;  Laterality: N/A;    INTRALUMINAL GASTROINTESTINAL TRACT IMAGING VIA CAPSULE N/A 12/29/2021    Procedure: IMAGING PROCEDURE, GI TRACT, INTRALUMINAL, VIA CAPSULE;  Surgeon: Tahir Geronimo RN;  Location: [facility] ENDO;  Service: Endoscopy;  Laterality: N/A;    PROSTATE SURGERY      prostate radiation    RADIOFREQUENCY THERMOCOAGULATION Left 6/4/2021    Procedure: Left L3-5 Lumbar RFA;   Surgeon: Tacho Trivedi MD;  Location: [facility] PAIN MGT;  Service: Pain Management;  Laterality: Left;    RADIOFREQUENCY THERMOCOAGULATION Right 6/18/2021    Procedure: Right L3-5 Lumbar RFA;  Surgeon: Tacho Trivedi MD;  Location: [facility] PAIN MGT;  Service: Pain Management;  Laterality: Right;    REPLACEMENT OF PACEMAKER GENERATOR Left 6/16/2022    Procedure: REPLACEMENT, PULSE GENERATOR, CARDIAC PACEMAKER/CRT-P device;  Surgeon: Darrel Carrero MD;  Location: [facility] CATH LAB;  Service: Cardiology;  Laterality: Left;  NOT MRI safe   Pacer and leads implanted 9/30/2017, Dr. Souleymane CARROLLT Consulta CRT-P C4TR01, [id number]   A lead: Mdt 5076 CapSureFix® Novus, [id number]   RV lead: Mdt 5076 CapSureFix® Novus, [id number]   LV lead: Mdt 4196 At    SELECTIVE INJECTION OF ANESTHETIC AGENT AROUND LUMBAR SPINAL NERVE ROOT BY TRANSFORAMINAL APPROACH Bilateral 6/8/2022    Procedure: Bilateral L3/4 TF XUAN with RN IV sedation;  Surgeon: Philipp Demarco MD;  [facility];  Service: Pain Management;  Laterality: Bilateral;    SPINE SURGERY      fusion    TONSILLECTOMY      TRIAL OF SPINAL CORD NERVE STIMULATOR N/A 1/25/2023    Procedure: Trial, Neurostimulator, Spinal Cord with RN IV sedation;  Surgeon: Philipp Demarco MD;  [facility];  Service: Pain Management;  Laterality: N/A;     Review of patient's allergies indicates:  No Known Allergies    Current Outpatient Medications   Medication Sig    acetaminophen (TYLENOL) 500 MG tablet Take 2 tablets (1,000 mg total) by mouth every 6 (six) hours as needed for Pain.    apixaban (ELIQUIS) 2.5 mg Tab Take 1 tablet (2.5 mg total) by mouth 2 (two) times daily.    atorvastatin (LIPITOR) 80 MG tablet Take 1 tablet (80 mg total) by mouth every evening.    blood sugar diagnostic Strp Check blood glucose levels daily in the AM fasting and 1-2 times more daily.    cefadroxil (DURICEF) 500 MG Cap Take 1 capsule (500 mg total) by mouth every 12 (twelve) hours.     cholecalciferol, vitamin D3, (VITAMIN D3) 25 mcg (1,000 unit) capsule Take 1,000 Units by mouth once daily.    fluticasone propionate (FLONASE) 50 mcg/actuation nasal spray 2 sprays (100 mcg total) by Each Nostril route once daily.    furosemide (LASIX) 40 MG tablet Take 1 tablet (40 mg total) by mouth 2 (two) times daily. Can double to 2 tablets twice a day for 3 days for more swelling/fluid build up - take 80mg twice a day    krill/om-3/dha/epa/phospho/ast (MEGARED OMEGA-3 KRILL OIL ORAL) Take 1 capsule by mouth every morning.    lancets Misc Check blood glucose levels daily in the AM fasting and 1-2 times more daily.    levocetirizine (XYZAL) 5 MG tablet Take 1 tablet (5 mg total) by mouth every evening.    losartan (COZAAR) 50 MG tablet Take 1 tablet (50 mg total) by mouth once daily.    multivitamin capsule Take 1 capsule by mouth once daily. Takes once a week    pantoprazole (PROTONIX) 40 MG tablet Take 1 tablet (40 mg total) by mouth once daily.    potassium chloride SA (K-DUR,KLOR-CON) 20 MEQ tablet Take 1 tablet (20 mEq total) by mouth once daily.    pyridoxine, vitamin B6, (B-6) 100 MG Tab Take 50 mg by mouth once daily.    spironolactone (ALDACTONE) 25 MG tablet Take 1 tablet (25 mg total) by mouth once daily.    vit A/vit C/vit E/zinc/copper (OCUVITE PRESERVISION ORAL) Take 1 capsule by mouth every evening.    clonazePAM (KLONOPIN) 1 MG tablet Take 1 tablet (1 mg total) by mouth nightly as needed for Anxiety.    pregabalin (LYRICA) 75 MG capsule Take 1 capsule QHS x 1 week, then increase to BID (if tolerated).     No current facility-administered medications for this visit.     Facility-Administered Medications Ordered in Other Visits   Medication    ondansetron injection 4 mg       Review of Systems     GENERAL:  No weight loss, malaise or fevers.  HEENT:   No recent changes in vision or hearing  NECK:  Negative for lumps, no difficulty with swallowing.  RESPIRATORY:  Negative for cough, wheezing or  "shortness of breath, patient denies any recent URI.  CARDIOVASCULAR:  Negative for chest pain, leg swelling or palpitations.  GI:  Negative for abdominal discomfort, blood in stools or black stools or change in bowel habits.  MUSCULOSKELETAL:  See HPI.  SKIN:  Negative for lesions, rash, and itching.  PSYCH:  No mood disorder or recent psychosocial stressors.   HEMATOLOGY/LYMPHOLOGY:  Negative for prolonged bleeding, bruising easily or swollen nodes.    NEURO:   No history of headaches, syncope, paralysis, seizures or tremors.  All other reviewed and negative other than HPI.    OBJECTIVE:    /86   Pulse 76   Resp 17   Ht 5' 9" (1.753 m)   Wt 96.3 kg (212 lb 4.9 oz)   BMI 31.35 kg/m²       Physical Exam    GENERAL: Well appearing, in no acute distress, alert and oriented x3.  PSYCH:  Mood and affect appropriate.  SKIN: Skin color, texture, turgor normal, no rashes or lesions.  HEAD/FACE:  Normocephalic, atraumatic. Cranial nerves grossly intact.    CV: RRR with palpation of the radial artery.  PULM: No evidence of respiratory difficulty, symmetric chest rise.  GI:  Soft and non-tender.    BACK: Straight leg raising in the sitting and supine positions is negative to radicular pain. No pain to palpation over the facet joints of the lumbar spine or spinous processes. Normal range of motion without pain reproduction.  Lower lumbar midline vertical scar and IPG site:  Wound edges approximated and fully closed  clean dry and intact with no signs of purulence or drainage.  Overlying Tegaderm dressings removed with ease.  Steri-Strips placed overlying lower lumbar midline incision  EXTREMITIES: Peripheral joint ROM is full and pain free without obvious instability or laxity in all four extremities. No deformities, edema, or skin discoloration. Good capillary refill.  MUSCULOSKELETAL: Able to stand on heels & toes.   Shoulder, hip, and knee provocative maneuvers are negative.  There is no pain with palpation over " the sacroiliac joints bilaterally.  FABERs test is negative.  FAER test is negative bilaterally.   Bilateral upper and lower extremity strength is normal and symmetric.  No atrophy or tone abnormalities are noted.  RIGHT Lower extremity: Hip flexion 5/5, Hip Abduction 5/5, Hip Adduction 5/5, Knee extension 5/5, Knee flexion 5/5, Ankle dorsiflexion5/5, Extensor hallucis longus 5/5, Ankle plantarflexion 5/5  LEFT Lower extremity:  Hip flexion 5/5, Hip Abduction 5/5,Hip Adduction 5/5, Knee extension 5/5, Knee flexion 5/5, Ankle dorsiflexion 5/5, Extensor hallucis longus 5/5, Ankle plantarflexion 5/5  -Normaltesting knee (patellar) jerk and ankle (achilles) jerk    NEURO: Bilateral upper and lower extremity coordination and muscle stretch reflexes are physiologic and symmetric. No loss of sensation is noted.  GAIT: normal.    Imaging:   CT lumbar spine 09/22/2021  FINDINGS:  Right total nephrectomy again noted.  Advanced atherosclerotic calcification.     Levoconvex lower lumbar scoliotic curvature secondary to asymmetric disc height loss on the right at L3-4 and L4-5.     T12-L1: Severe disc height loss with endplate sclerosis, vacuum disc phenomenon, and anterior osteophyte formation.  No spinal canal stenosis.  No significant right neural foraminal stenosis.  Moderate left neural foraminal stenosis.     L1-2: No significant disc height loss.  Hypertrophic facet arthropathy.     L2-3: Chronic ankylosis of L2-3.  Moderate right and mild left neural foraminal stenosis.  No spinal canal stenosis.  Ankylosis of posterior elements also noted.     L3-4: Severe disc height loss with endplate sclerosis and osteophyte formation.  Severe right and moderate to severe left neural foraminal stenosis.  Severe hypertrophic facet arthropathy.     L4-5: Laminectomy changes noted. Leftward chronic subluxation of L4 relative to L3 and L5 with right worse than left disc height loss with endplate sclerosis and osteophyte formation.   Right-sided L4 vertebral height loss.  Severe right and mild left neural foraminal stenosis.  Severe hypertrophic facet arthropathy.     L5-S1: No significant disc height loss.  Mild left neural foraminal stenosis.  Severe hypertrophic facet arthropathy.     Impression:   Severe chronic multilevel discogenic degenerative change and facet arthropathy of the lumbar spine as described above.       09/25/19    X-Ray Lumbar Spine Complete 5 View    FINDINGS:  Scoliosis remains.  Vertebral body heights and alignment are stable.  Multilevel advanced degenerative disc height loss and osteophyte formation noted.  Multilevel facet arthropathy present more prevalent at the lower lumbar spine.  No acute osseous abnormality appreciated.  Aorta iliac atherosclerotic vascular calcifications noted.    08/26/14    X-Ray Cervical Spine AP And Lateral    Narrative  Findings: Vertebral alignment is normal.  There is narrowing of the disk spaces and  anterior osteophyte formation C 3-7.  There are no compression fractures or acute abnormalities are seen.  Carotid calcifications are noted bilaterally.    ASSESSMENT: 82 y.o. year old male with lower back and right leg pain, consistent with     1. Malfunction of spinal cord stimulator, initial encounter  X-Ray Lumbar Complete Including Flex And Ext    X-Ray Thoracic Spine 4 or more views      2. Failed back surgical syndrome        3. Causalgia of lower extremity, unspecified laterality        4. Postlaminectomy syndrome              PLAN:   - Interventions:   -Neurophys evaluation: Ms. Beasley 08/16/22  -CT thoracic spine: 07/06/22  -Cardiovascular clearance: 10/27/22  : Low cardiovascular risk, hold Eliquis 3 prior  -CBC/CMP: 12/22/22  -Nevro spinal cord stimulator trial: 1/25-2/3/23 with 85% relief  -Nevro spinal cord stimulator implant 03/23/2023 with 100% relief in lower back and leg pain.  Device malfunction (remote) for the last week, with only 20% pain relief at this time.   [employer] engineering team contacted to help facilitate.  We have discussed considering IPG exchange if reprogramming can not be facilitated within 1 week.    - Anticoagulation use: yes  Eliquis  -HF, EF 35%, PPM  -Dr. Hood     report:  Reviewed and consistent with medication use as prescribed.      - Medications:  -continue Lyrica to see if this better controls neuropathic pain.  We discussed potential side effects of this medication which may include drowsiness,dizziness, dry mouth, constipation or peripheral edema.  -75 mg twice daily  -Refill sent in      -We have discussed continuing judicious use of Tylenol, not to exceed 3000 mg daily to avoid acute hepatotoxicity.    - Therapy:   We discussed continuing physical therapy to help manage the patient/s painful condition. The patient was counseled that muscle strengthening will improve the long term prognosis in regards to pain and may also help increase range of motion and mobility.     - Imaging: Reviewed available imaging with patient and answered any questions they had regarding study     - Follow up visit:  We will plan an audio visit with the patient in the next 48 hours to discuss game plan for device malfunction      The above plan and management options were discussed at length with patient. Patient is in agreement with the above and verbalized understanding.    - I discussed the goals of interventional chronic pain management with the patient on today's visit. We discussed a multimodal and systematic approach to pain.  This includes diagnostic and therapeutic injections, adjuvant pharmacologic treatment, physical therapy, and at times psychiatry.  I emphasized the importance of regular exercise, core strengthening and stretching, diet and weight loss as a cornerstone of long-term pain management.    - This condition does not require this patient to take time off of work, and the primary goal of our Pain Management services is to improve the patient's  functional capacity.  - Patient Questions: Answered all of the patient's questions regarding diagnoses, therapy, treatment and next steps        Philipp Demarco MD  Interventional Pain Management  [facility]    Disclaimer:  This note was prepared using voice recognition system and is likely to have sound alike errors that may have been overlooked even after proof reading.  Please call me with any questions

## 2023-05-03 NOTE — PROGRESS NOTES
Subjective:   Patient ID:  Jake Ortiz is a 82 y.o. male who presents for cardiac consult of No chief complaint on file.          The patient came in today for cardiac consult of No chief complaint on file.      Jake Ortiz is a 82 y.o. male pt with CAD s/p CABG, old MI, CHF EF 35% with TR/MR, AVB s/p CRT, PAF on Eliquis, Anemia, HTN, HLD, DM2, NASREEN, CKD4, GERD, Restrictive/obstructive lung disease here for follow up CV care.    4/21/20  Pt seen at [facility] last by CARMELINA Spencer 9/18/19. Overall feels great for the most part. He has been started to gain some water weight in the past, he had been on Entreso by Dr. Menendez, lost 173 lbs. He had paracentesis in past was told due to CHF. His weight started coming back up to 208 lbs and then was gaining weight and he has doubled his meds - Entresto. Otherwise feels well has good energy. He changed here due to insurance.   BP - 108/67, pulse 62, oxygen 98%, weight 209 lbs, blood sugar 127    5/22/20  BNP was neg after last visit, Cr slightly increased with BUN elevated told to take lasix daily and PRN extra. He saw Dr. Holley yesterday, was given Flonase breathing improved.   /120s systolics, 67 diastolic, pulse 60s. . Overall doing will. Needs device clinic.     7/16/20  ECHO with EF 50%, mild to mod MR/TR. Pt had trouble getting Entresto filled due to being in donut hole but patient assistance form filled out.   He has significant back pain. He is euvolemic, no CP/SOB.   ECG - bi V paced    9/24/20 - hosp f/u  He was admitted for lower GI bleed and acute blood loss anemia. Initial H/H of 5.1/16.9.  Patient was transfused 2 units of PRBCs. GI consulted. Will hold anticoagulations. Today H/H 7.4/23.3. Case  discussed with Dr. Farmer, will need  EGD and colonoscopy to evaluate further and given recent anticoagulation to decrease the risk of bleeding from the procedures will plan for the procedures in 5 days. As of 9/20 pt had a bowel movement with red  blood yesterday morning, no further episodes reported. This morning H/H 6.7/21.5, will transfuse 2 units of PRBCs. Plans for EGD/colonoscopy on Tuesday. As of 9/21 no active bleeding noted. H/H 9.0/28.0. Continue to monitor. Plan for procedure tomorrow. 09/22: Patient had EGD that showed gastritis which was biopsied. Discussed with GI who recommends dc home today, restart anti coagulation tomorrow, and f/u OP for video capsule study. GI will arrange OP f/u and call patient. New Rx for Pantoprazole 40mg daily sent to pharmacy.   He has restarted taking Eliqis    11/3/20  He has been to ER few times with fatigue and symptomatic anemia, transfused 10/23/20 and then again last Sunday 11/2/20. He cannot get Video capsule study due to pacemaker. He will see Dr. Nath for further workup tx. He has severe back pain, had injection which improved sx will get RFA. Will change Entresto due to Losartan, has trouble affording some meds, ELiquis is also expensive but prefer over coumadin.     2/11/21  He has gotten both COVID vaccines, he is pleased with the process. He has been doing well overall. No CP/SOB. BP and HR well controlled.   He had renal eval and told may need more Losartan.     5/6/21  BP and HR is well controlled.   ECG - V paced, Biv paced    From Device check  saw this pt in clinic in February. He appears to be symptomatic to [place] alert for fluid overload. He confirms taking Lasix BID. He reports having 3L of fluid taken off of him a year ago in the hospital. He has AF and is currently undersensing some of the episodes. We will attempt to address this in clinic. He is a CRT-P and his BiV pacing has decreased to 91.8%, which in order to be effective is preferred at 95%. He does not see an EP.    Today he has fatigue and back pain. He has had crawfish a few times last few months.     7/15/21  Last device interrog with AF with freq mode switch and inc Optivol.     Per Dr. Walton-    Last week he was found to have  a critical low hemoglobin of 5.8.  Sent to the ER and had 2 units packed red blood cells transfused.  Has had a problem with chronic anemia with multiple transfusions in the past.  Last EGD and colonoscopy were negative.  No small bowel studies were done at that time.  Also followed by hematology for iron infusions.    He will see heme/onc next week.     10/28/21  From pt - I have some shortness of breath, but I have been under the weather. I've seen Dr. Walton last Friday. Feeling bloated, took a potassium tablet last night. Very little coughing today, starting to dry up. See you tomorrow.  His feet have been getting too tight in shoes.     11/11/21  He went to a recent party where he was very active and feeling well now. He had recent URI sx imporve. Weight 205-207 lbs. BP and HR stable.   ECG - V paced, Bi V paced    Hospital Course:   Patient was kept on OBS for symptomatic anemia under the care of hospital medicine.  12/23: Hgb increased to 7.4 overnight with transfusion of 2u pRBC. Pt is still very tired. Troponin increased to 0.118, pt mukesh CP, palpitations, but admits to SOB with exertion. cardiology consulted. GI feels like no indication for intervention, will continue OP f/u for VCE that is scheduled for next week, they have signed off. Heme/onc following - will start IV iron and continue OP f/u as scheduled.  12/24/21 Hemoglobin stable. Will have VCE completed outpatient per GI. Okay to resume Eliquis 2.5mg daily BID. He was asked to hold Plavix and start ASA for CAD until after GI test.     12/30/21  BP 140s/70s. HR 80s. Pt had recent admission for anemia with elevated trop, s/p PRBC, is on Eliquis 2.5 BID, DAPT on hold. He is taking only half tab Eliquis BID.     Component Ref Range & Units 3 wk ago   (12/30/21) 1 mo ago   (12/17/21) 2 mo ago   (10/28/21) 8 mo ago   (5/11/21) 1 yr ago   (4/27/20) 3 yr ago   (7/31/18) 3 yr ago   (3/5/18)   BNP 0 - 99 pg/mL 471 High   222 High  CM  282 High  CM             1/20/22  BP and HR stable today. BNP was elevated told pt - labs reveal elevated BNP due to extra fluid, need to double up lasix to 40mg twice a day until breathing is improving and have less swelling/weight, continue monitoring bP and weights daily and low salt diet, follow up with me in 2-3 weeks will adjust meds further. Kidney function, potassium and magnesium are stable.   He still has more back pain, sees Dr. Gamez. His breathing is better and energy is improved. He is taking iron tabs. His video capsule was neg. No further bleeding issues.   219 lbs --> 206 lbs today     2/10/22  Pt had eval with Dr. Price will be a candidate for Watchman. Labs from last visit - labs reveal low potassium and magnesium, start taking potassium 20 meq and magnesium 400mg daily. BNP has improved.   BP and HR stable now. He wants to get Watchman in [place] possible. Breathing has improved, weight is coming down.     4/28/22  BP and HR stable. He has been gaining more weight lately, about 6 lbs, he has more KING as well. His device interrogation .  OptiVol WNL, possible fluid build up Feb 7 - May 22nd.  Pt has congestive heart failure and cardiomegaly.  Well-functioning device.     8/11/22  Pt had CRT-P Gen change 6/2022 due to battery depletion . He had recent eval with Dr. Moody regarding banding hemorrhoids if anemia/bleeding is worse but may need EGD/Cscope with GI in the interim. PT remains on Eliquis 2.5 mg BID, not on ASA now. He has significant back pain can't walk as much, he is seeing Dr. Demarco.   BP low normal, HR 96.     10/27/22  Pt has upcoming spinal cord stim on Nevro 11/16/22 with Dr. Demarco. BP and Hr well controlled. BMI 28 - 190 lbs. Recent device interrogation with John EMERSON, had CHF exac mid aug.     5/3/23  He still has back pain now since spinal cord stim, he will f/u with pain management again. BP and HR stable.   CHF: OptiVol WNL, possible fluid build up Jan 22 - Feb 3, 2023; pt asymptomatic.  Pt has  congestive heart failure and cardiomegaly.   ECG - V paced    Feels ok now. Takes 2 lasix in AM and once in evening. BMI 31 - 212 lbs.     Patient feels no chest pain,  no PND, no palpitation, no dizziness, no syncope, no CNS symptoms.    Patient has dec exercise tolerance.    Patient is compliant with medications.    CRT interrogation 10/6/22  Underlying rhythm c/w: AF w/CHB, V paced @ 30 bpm, no escape seen  Incision: Chronic left upper chest, C/D/I, no signs of pocket erosion.  Device fxn WNL  RA pacing n/a%, BiV pacing 99.7%  Battery Status/Longevity: 6.7 years, 3.12V (RRT 2.60V)  Atrial arrhythmias: chronic AF  Ventricular arrhythmias: none  Anticoagulant: Eliquis  CHF: OptiVol currently WNL, possible fluid build up Aug 15-17th.  Pt has congestive heart failure and cardiomegaly.   Reprogramming at this visit: none  Nurse note: Interrogation reviewed by XIMENA Franco NP  F/U via remote interrogation in 3 mos     Interrogation performed by SELIN Madden      Results for orders placed during the hospital encounter of 04/28/22    Echo    Interpretation Summary  · The left ventricle is normal in size with concentric hypertrophy and moderately decreased systolic function.  · The estimated ejection fraction is 35%.  · Grade III left ventricular diastolic dysfunction.  · Mild right ventricular enlargement with moderately reduced right ventricular systolic function.  · Mild left atrial enlargement.  · Mild right atrial enlargement.  · There is mild aortic valve stenosis.  · Aortic valve area is 1.53 cm2; peak velocity is 1.78 m/s; mean gradient is 7 mmHg.  · Mild mitral regurgitation.  · Mild tricuspid regurgitation.  · Elevated central venous pressure (15 mmHg).  · The estimated PA systolic pressure is 37 mmHg.  · There is abnormal septal wall motion. There is systolic and diastolic flattening of the interventricular septum consistent with right ventricle pressure and volume overload.  · There is moderate left ventricular global  hypokinesis.      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    #4 PROSTATE BIOPSY, LEFT APEX: ADENOCARCINOMA, FARHAD GRADE 3 + 3 = 6, IN 1 of 2 CORES 9/8/2014    Anemia     Arthritis     Atrial fibrillation 10/19/2017    Back pain     Congestive heart failure 03/05/2018    Coronary artery disease     DM (diabetes mellitus) 2018     am 06/23/2020    DM (diabetes mellitus) 2016     am 08/17/2021    Hyperlipemia     Hypertension     Myocardial infarction     NASREEN (obstructive sleep apnea) 06/05/2018    Prostate cancer 2015    Tobacco dependence     Type 2 diabetes mellitus        Past Surgical History:   Procedure Laterality Date    COLONOSCOPY N/A 9/22/2020    Procedure: COLONOSCOPY;  Surgeon: Javier Farmer MD;  Location: [facility] ENDO;  Service: Endoscopy;  Laterality: N/A;    CORONARY ARTERY BYPASS GRAFT  1987    EPIDURAL STEROID INJECTION N/A 11/22/2019    Procedure: Lumbar L5/S1 IL XUAN;  Surgeon: Tacho Trivedi MD;  Location: [facility] PAIN MGT;  Service: Pain Management;  Laterality: N/A;    EPIDURAL STEROID INJECTION N/A 1/6/2020    Procedure: Lumbar L5/S1 IL XUAN;  Surgeon: Tacho Trivedi MD;  Location: [facility] PAIN MGT;  Service: Pain Management;  Laterality: N/A;    EPIDURAL STEROID INJECTION N/A 2/10/2020    Procedure: Caudal XUAN;  Surgeon: Tacho Trivedi MD;  Location: [facility] PAIN MGT;  Service: Pain Management;  Laterality: N/A;    ESOPHAGOGASTRODUODENOSCOPY N/A 9/22/2020    Procedure: EGD (ESOPHAGOGASTRODUODENOSCOPY);  Surgeon: Javier Farmer MD;  Location: [facility] ENDO;  Service: Endoscopy;  Laterality: N/A;    INJECTION OF ANESTHETIC AGENT AROUND MEDIAL BRANCH NERVES INNERVATING LUMBAR FACET JOINT Bilateral 10/12/2020    Procedure: Bilateral L3-5 MBB;  Surgeon: Tacho Trivedi MD;  Location: [facility] PAIN [facility]GT;  Service: Pain Management;  Laterality: Bilateral;    INJECTION OF ANESTHETIC AGENT AROUND MEDIAL BRANCH NERVES  INNERVATING LUMBAR FACET JOINT Bilateral 12/21/2020    Procedure: Bilateral L3-5 MBB with steroid;  Surgeon: Tacho Trivedi MD;  Location: [facility] PAIN MGT;  Service: Pain Management;  Laterality: Bilateral;    INSERTION OF SPINAL NEUROSTIMULATOR N/A 3/23/2023    Procedure: INSERTION, NEUROSTIMULATOR, SPINAL;  Surgeon: Philipp Demarco MD;  Location: [facility] OR;  Service: Pain Management;  Laterality: N/A;    INTRALUMINAL GASTROINTESTINAL TRACT IMAGING VIA CAPSULE N/A 12/29/2021    Procedure: IMAGING PROCEDURE, GI TRACT, INTRALUMINAL, VIA CAPSULE;  Surgeon: Tahir Geronimo RN;  Location: [facility] ENDO;  Service: Endoscopy;  Laterality: N/A;    PROSTATE SURGERY      prostate radiation    RADIOFREQUENCY THERMOCOAGULATION Left 6/4/2021    Procedure: Left L3-5 Lumbar RFA;  Surgeon: Tacho Trivedi MD;  Location: [facility] PAIN MGT;  Service: Pain Management;  Laterality: Left;    RADIOFREQUENCY THERMOCOAGULATION Right 6/18/2021    Procedure: Right L3-5 Lumbar RFA;  Surgeon: Tacho Trivedi MD;  Location: [facility] PAIN MGT;  Service: Pain Management;  Laterality: Right;    REPLACEMENT OF PACEMAKER GENERATOR Left 6/16/2022    Procedure: REPLACEMENT, PULSE GENERATOR, CARDIAC PACEMAKER/CRT-P device;  Surgeon: Darrel Carrero MD;  Location: [facility] CATH LAB;  Service: Cardiology;  Laterality: Left;  NOT MRI safe   Pacer and leads implanted 9/30/2017, Dr. Souleymane SANTACRUZ Consulta CRT-P C4TR01, [id number]   A lead: Jovan 5076 CapSureFix® Novus, [id number]   RV lead: Jovan 5076 CapSureFix® Novus, [id number]   LV lead: Jovan 4196 At    SELECTIVE INJECTION OF ANESTHETIC AGENT AROUND LUMBAR SPINAL NERVE ROOT BY TRANSFORAMINAL APPROACH Bilateral 6/8/2022    Procedure: Bilateral L3/4 TF XUAN with RN IV sedation;  Surgeon: Philipp Demarco MD;  Location: [facility] PAIN MGT;  Service: Pain Management;  Laterality: Bilateral;    SPINE SURGERY      fusion    TONSILLECTOMY      TRIAL OF SPINAL CORD NERVE STIMULATOR N/A 1/25/2023    Procedure: Trial, Neurostimulator,  Spinal Cord with RN IV sedation;  Surgeon: Philipp Demarco MD;  Location: [facility] PAIN T;  Service: Pain Management;  Laterality: N/A;       Social History     Tobacco Use    Smoking status: Former     Packs/day: 1.50     Years: 20.00     Pack years: 30.00     Types: Cigarettes     Start date:      Quit date:      Years since quittin.3    Smokeless tobacco: Never   Substance Use Topics    Alcohol use: No    Drug use: No       Family History   Problem Relation Age of Onset    Heart attack Mother     Diabetes Mother     Heart disease Mother     Cataracts Mother     Stroke Father     Heart disease Father     Heart disease Brother        Patient's Medications   New Prescriptions    No medications on file   Previous Medications    ACETAMINOPHEN (TYLENOL) 500 MG TABLET    Take 2 tablets (1,000 mg total) by mouth every 6 (six) hours as needed for Pain.    APIXABAN (ELIQUIS) 2.5 MG TAB    Take 1 tablet (2.5 mg total) by mouth 2 (two) times daily.    ATORVASTATIN (LIPITOR) 80 MG TABLET    Take 1 tablet (80 mg total) by mouth every evening.    BLOOD SUGAR DIAGNOSTIC STRP    Check blood glucose levels daily in the AM fasting and 1-2 times more daily.    CEFADROXIL (DURICEF) 500 MG CAP    Take 1 capsule (500 mg total) by mouth every 12 (twelve) hours.    CHOLECALCIFEROL, VITAMIN D3, (VITAMIN D3) 25 MCG (1,000 UNIT) CAPSULE    Take 1,000 Units by mouth once daily.    CLONAZEPAM (KLONOPIN) 1 MG TABLET    Take 1 tablet (1 mg total) by mouth nightly as needed for Anxiety.    FLUTICASONE PROPIONATE (FLONASE) 50 MCG/ACTUATION NASAL SPRAY    2 sprays (100 mcg total) by Each Nostril route once daily.    FUROSEMIDE (LASIX) 40 MG TABLET    Take 1 tablet (40 mg total) by mouth 2 (two) times daily. Can double to 2 tablets twice a day for 3 days for more swelling/fluid build up - take 80mg twice a day    KRILL/OM-3/DHA/EPA/PHOSPHO/AST (MEGARED OMEGA-3 KRILL OIL ORAL)    Take 1 capsule by mouth every morning.    LANCETS MISC     Check blood glucose levels daily in the AM fasting and 1-2 times more daily.    LEVOCETIRIZINE (XYZAL) 5 MG TABLET    Take 1 tablet (5 mg total) by mouth every evening.    LOSARTAN (COZAAR) 50 MG TABLET    Take 1 tablet (50 mg total) by mouth once daily.    MULTIVITAMIN CAPSULE    Take 1 capsule by mouth once daily. Takes once a week    PANTOPRAZOLE (PROTONIX) 40 MG TABLET    Take 1 tablet (40 mg total) by mouth once daily.    POTASSIUM CHLORIDE SA (K-DUR,KLOR-CON) 20 MEQ TABLET    Take 1 tablet (20 mEq total) by mouth once daily.    PREGABALIN (LYRICA) 75 MG CAPSULE    Take 1 capsule QHS x 1 week, then increase to BID (if tolerated).    PYRIDOXINE, VITAMIN B6, (B-6) 100 MG TAB    Take 50 mg by mouth once daily.    SPIRONOLACTONE (ALDACTONE) 25 MG TABLET    Take 1 tablet (25 mg total) by mouth once daily.    VIT A/VIT C/VIT E/ZINC/COPPER (OCUVITE PRESERVISION ORAL)    Take 1 capsule by mouth every evening.   Modified Medications    No medications on file   Discontinued Medications    No medications on file       Review of Systems   Constitutional:  Positive for malaise/fatigue.   HENT: Negative.     Eyes: Negative.    Respiratory:  Positive for shortness of breath.    Cardiovascular:  Positive for leg swelling. Negative for chest pain.   Gastrointestinal: Negative.    Genitourinary: Negative.    Musculoskeletal: Negative.    Skin: Negative.    Neurological: Negative.    Endo/Heme/Allergies: Negative.    Psychiatric/Behavioral: Negative.     All 12 systems otherwise negative.    Wt Readings from Last 3 Encounters:   05/03/23 96.5 kg (212 lb 11.9 oz)   05/01/23 96.3 kg (212 lb 4.9 oz)   04/25/23 94.2 kg (207 lb 10.8 oz)     Temp Readings from Last 3 Encounters:   04/25/23 97.3 °F (36.3 °C)   04/25/23 97.3 °F (36.3 °C) (Temporal)   04/14/23 97.7 °F (36.5 °C)     BP Readings from Last 3 Encounters:   05/03/23 138/75   05/01/23 131/86   04/25/23 113/62     Pulse Readings from Last 3 Encounters:   05/03/23 90   05/01/23  76   04/25/23 88       /75   Pulse 90   Wt 96.5 kg (212 lb 11.9 oz)   SpO2 98%   BMI 31.42 kg/m²     Objective:   Physical Exam  Vitals and nursing note reviewed.   Constitutional:       General: He is not in acute distress.     Appearance: He is well-developed. He is not diaphoretic.   HENT:      Head: Normocephalic and atraumatic.      Nose: Nose normal.   Eyes:      General: No scleral icterus.        Right eye: No discharge.         Left eye: No discharge.      Conjunctiva/sclera: Conjunctivae normal.   Neck:      Thyroid: No thyromegaly.      Vascular: No JVD.   Cardiovascular:      Rate and Rhythm: Normal rate and regular rhythm.      Heart sounds: S1 normal and S2 normal. Murmur heard.     No friction rub. No gallop. No S3 or S4 sounds.   Pulmonary:      Effort: Pulmonary effort is normal. No respiratory distress.      Breath sounds: Normal breath sounds. No stridor. No wheezing or rales.   Chest:      Chest wall: No tenderness.   Abdominal:      General: Bowel sounds are normal. There is no distension.      Palpations: Abdomen is soft. There is no mass.      Tenderness: There is no abdominal tenderness. There is no rebound.   Genitourinary:     Comments: Deferred  Musculoskeletal:         General: No tenderness or deformity. Normal range of motion.      Cervical back: Normal range of motion and neck supple.   Lymphadenopathy:      Cervical: No cervical adenopathy.   Skin:     General: Skin is warm and dry.      Coloration: Skin is not pale.      Findings: No erythema or rash.   Neurological:      Mental Status: He is alert and oriented to person, place, and time.      Motor: No abnormal muscle tone.      Coordination: Coordination normal.   Psychiatric:         Behavior: Behavior normal.         Thought Content: Thought content normal.         Judgment: Judgment normal.       Lab Results   Component Value Date     04/25/2023    K 4.9 04/25/2023     04/25/2023    CO2 24 04/25/2023    BUN  65 (H) 04/25/2023    CREATININE 2.0 (H) 04/25/2023     04/25/2023    HGBA1C 4.9 10/14/2022    MG 1.8 04/30/2022    AST 21 04/25/2023    ALT 12 04/25/2023    ALBUMIN 4.1 04/25/2023    PROT 8.0 04/25/2023    BILITOT 0.3 04/25/2023    WBC 4.86 04/25/2023    HGB 9.3 (L) 04/25/2023    HCT 29.5 (L) 04/25/2023     (H) 04/25/2023     04/25/2023    INR 1.1 06/16/2022    TSH 2.652 02/24/2023    CHOL 124 07/14/2022    HDL 43 07/14/2022    LDLCALC 54.4 (L) 07/14/2022    TRIG 133 07/14/2022     (H) 05/11/2022     Assessment:      1. Paroxysmal atrial fibrillation    2. Presence of cardiac pacemaker    3. Chronic combined systolic and diastolic congestive heart failure    4. Cardiac pacemaker in situ    5. Presence of cardiac resynchronization therapy pacemaker (CRT-P)    6. S/P CABG x 3    7. Arteriosclerosis of aorta    8. Primary hypertension    9. Lumbar radiculopathy, chronic    10. Mixed restrictive and obstructive lung disease    11. Longstanding persistent atrial fibrillation    12. Obstructive sleep apnea    13. Iron deficiency anemia due to chronic blood loss    14. Coronary artery disease of native artery of native heart with stable angina pectoris    15. Stage 4 chronic kidney disease            Plan:   1. CAD s/p CABG x3, old MI  - cont meds - DAPT on hold due to anemia   - elevated trop sec to demand ischemia   - cont Eliquis    2. CHF EF 35%, with TR/MR, improved EF ;  --> 282 -> 222 --> 471 --> 230  --> 507 --> 276  Weight; 219 lbs --> 206  --> 200 lbs  --> 195 lbs --> BMI 31 - 212 lbs.   - cont meds lasix 40 BID ; with K/Mg   - needs to take 80mg BID PRN edema  - cont to monitor valve disease  - changed Entresto to Losartan   - cont Aldactone 25mg    3. Chronic Afib   - cont meds - Eliquis   - f/u Dr. Price for Watchman device - pending/on hold     [id number] score: 5  HAS-BLED score: 5    If you answer NO to any of the four criteria below, the patient does not meet the  WATCHMAN implant eligibility requirements.     Patient has non-valvular atrial fibrillation: Yes  Patient has an increased risk for stroke and is recommended for oral anticoagulation (OAC): Yes  Patient is suitable for short-term anticoagulation therapy but deemed unable to take long-term OAC: Yes  Patient has an appropriate rationale to seek a non-pharmacological alternative to OACs. Specific factors include: History of bleeding or increased bleeding risk,    CHADSVASC - 5  HASLBED score - 5    4. NASREEN  - cont CPAP    5. Restricive/obstrucive lung disease  - cont tx per PCP/pulm    6. DM2 6.0  --> 6.1 --> 6.3 --> 5.1 --> 4.9  - cont tx per PCP    7. AVB s/p ICD - s/p CRT-P gen change 6/2022  - cont to monitor  - f/u device clinic and EP    8. Anemia sec to GIB, CKD  - f/u with GI and heme/onc; s/p video capsule - neg in past  - s/p PRBC, is on Eliquis 2.5 BID, DAPT on hold.   - cont iron tabs and IV iron infusion   - f/u colorectal sx - may need hemorrhoidal banding sx    9. CKD  - cont to monitor   - f/u nephro       Thank you for allowing me to participate in this patient's care. Please do not hesitate to contact me with any questions or concerns. Consult note has been forwarded to the referral physician.

## 2023-05-09 NOTE — DISCHARGE INSTRUCTIONS
.[facility]  [address] [facility] [address]  [address]  [phone] phone     [phone] fax  Hours of Operation: Monday- Friday 8:00am- 5:00pm  After hours phone  [phone]  Hematology / Oncology Physicians on call    Dr. Portillo Barrera      Nurse Practitioners:    Natalie Ying, NEGIN Virgen, NEGIN Mckay, NEGIN Manzo, NEGIN Hatfield, CARMELINA Melvin      Please don't hesitate to call if you have any concerns.

## 2023-05-10 NOTE — ED PROVIDER NOTES
SCRIBE #1 NOTE: I, Catarino Campa, am scribing for, and in the presence of, Tacho Gilbert MD. I have scribed the HPI, ROS, and PEx.     SCRIBE #2 NOTE: I, Sindy Clark, am scribing for, and in the presence of,  Emma Sheehan MD. I have scribed the remaining portions of the note not scribed by Scribe #1.   History      Chief Complaint   Patient presents with    Weakness     Weakness to his legs x two days. No reported pain. Pt has a spinal stimulator implant. EMS reports hypotension        Review of patient's allergies indicates:  No Known Allergies     HPI   HPI    5/10/2023, 5:09 PM   History obtained from the patient      History of Present Illness: Jake Ortiz is a 82 y.o. male patient who presents to the Emergency Department for BLE weakness, onset 3 days PTA. Pt states that he has been unable to walk since this morning and had fallen while getting out of bed today, landing on his L knee. Symptoms are intermittent and moderate in severity. No mitigating or exacerbating factors reported. Associated sxs include diarrhea and L knee pain. Patient denies any fever, chills, n/v, SOB, CP, numbness, dizziness, headache, and all other sxs at this time. Pt notes that he had his nerve stimulator implant manipulated yesterday. No further complaints or concerns at this time.     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    #4 PROSTATE BIOPSY, LEFT APEX: ADENOCARCINOMA, FARHAD GRADE 3 + 3 = 6, IN 1 of 2 CORES 9/8/2014    Anemia     Arthritis     Atrial fibrillation 10/19/2017    Back pain     Congestive heart failure 03/05/2018    Coronary artery disease     DM (diabetes mellitus) 2018     am 06/23/2020    DM (diabetes mellitus) 2016     am 08/17/2021    Hyperlipemia     Hypertension     Myocardial infarction     NASREEN (obstructive sleep apnea) 06/05/2018    Prostate cancer 2015     Tobacco dependence     Type 2 diabetes mellitus        Past Surgical History:  Past Surgical History:   Procedure Laterality Date    COLONOSCOPY N/A 9/22/2020    Procedure: COLONOSCOPY;  Surgeon: Javier Farmer MD;  Location: [facility] [place] ENDO;  Service: Endoscopy;  Laterality: N/A;    CORONARY ARTERY BYPASS GRAFT  1987    EPIDURAL STEROID INJECTION N/A 11/22/2019    Procedure: Lumbar L5/S1 IL XUAN;  Surgeon: Tacho Trivedi MD;  Location: [facility];  Service: Pain Management;  Laterality: N/A;    EPIDURAL STEROID INJECTION N/A 1/6/2020    Procedure: Lumbar L5/S1 IL XUAN;  Surgeon: Tacho Trivedi MD;  Location: [facility];  Service: Pain Management;  Laterality: N/A;    EPIDURAL STEROID INJECTION N/A 2/10/2020    Procedure: Caudal XUAN;  Surgeon: Tacho Trivedi MD;  Location: [facility];  Service: Pain Management;  Laterality: N/A;    ESOPHAGOGASTRODUODENOSCOPY N/A 9/22/2020    Procedure: EGD (ESOPHAGOGASTRODUODENOSCOPY);  Surgeon: Javier Farmer MD;  Location: [facility] [place] ENDO;  Service: Endoscopy;  Laterality: N/A;    INJECTION OF ANESTHETIC AGENT AROUND MEDIAL BRANCH NERVES INNERVATING LUMBAR FACET JOINT Bilateral 10/12/2020    Procedure: Bilateral L3-5 MBB;  Surgeon: Tacho Trivedi MD;  Location: [facility];  Service: Pain Management;  Laterality: Bilateral;    INJECTION OF ANESTHETIC AGENT AROUND MEDIAL BRANCH NERVES INNERVATING LUMBAR FACET JOINT Bilateral 12/21/2020    Procedure: Bilateral L3-5 MBB with steroid;  Surgeon: Tacho Trivedi MD;  Location: [facility];  Service: Pain Management;  Laterality: Bilateral;    INSERTION OF SPINAL NEUROSTIMULATOR N/A 3/23/2023    Procedure: INSERTION, NEUROSTIMULATOR, SPINAL;  Surgeon: Philipp Demarco MD;  Location: [facility] [place] OR;  Service: Pain Management;  Laterality: N/A;    INTRALUMINAL GASTROINTESTINAL TRACT IMAGING VIA CAPSULE N/A 12/29/2021    Procedure: IMAGING PROCEDURE, GI TRACT, INTRALUMINAL, VIA CAPSULE;  Surgeon: Tahir Geronimo RN;   Location: [facility] ENDO;  Service: Endoscopy;  Laterality: N/A;    PROSTATE SURGERY      prostate radiation    RADIOFREQUENCY THERMOCOAGULATION Left 6/4/2021    Procedure: Left L3-5 Lumbar RFA;  Surgeon: Tacho Trivedi MD;  Location: [facility] PAIN MGT;  Service: Pain Management;  Laterality: Left;    RADIOFREQUENCY THERMOCOAGULATION Right 6/18/2021    Procedure: Right L3-5 Lumbar RFA;  Surgeon: Tacho Trivedi MD;  Location: [facility] PAIN MGT;  Service: Pain Management;  Laterality: Right;    REPLACEMENT OF PACEMAKER GENERATOR Left 6/16/2022    Procedure: REPLACEMENT, PULSE GENERATOR, CARDIAC PACEMAKER/CRT-P device;  Surgeon: Darrel Carrero MD;  Location: [facility] CATH LAB;  Service: Cardiology;  Laterality: Left;  NOT MRI safe   Pacer and leads implanted 9/30/2017, Dr. Souleymane SANTACRUZ Consulta CRT-P C4TR01, [id number]   A lead: Mdt 5076 CapSureFix® Novus, [id number]   RV lead: Mdt 5076 CapSureFix® Novus, [id number]   LV lead: Jovan 4196 At    SELECTIVE INJECTION OF ANESTHETIC AGENT AROUND LUMBAR SPINAL NERVE ROOT BY TRANSFORAMINAL APPROACH Bilateral 6/8/2022    Procedure: Bilateral L3/4 TF XUAN with RN IV sedation;  Surgeon: Philipp Demarco MD;  Location: [facility] PAIN MGT;  Service: Pain Management;  Laterality: Bilateral;    SPINE SURGERY      fusion    TONSILLECTOMY      TRIAL OF SPINAL CORD NERVE STIMULATOR N/A 1/25/2023    Procedure: Trial, Neurostimulator, Spinal Cord with RN IV sedation;  Surgeon: Philipp Demarco MD;  Location: [facility] PAIN MGT;  Service: Pain Management;  Laterality: N/A;         Family History:  Family History   Problem Relation Age of Onset    Heart attack Mother     Diabetes Mother     Heart disease Mother     Cataracts Mother     Stroke Father     Heart disease Father     Heart disease Brother        Social History:  Social History     Tobacco Use    Smoking status: Former     Packs/day: 1.50     Years: 20.00     Pack years: 30.00     Types: Cigarettes     Start date: 1968     Quit date: 1988     Years  since quittin.3    Smokeless tobacco: Never   Substance and Sexual Activity    Alcohol use: No    Drug use: No    Sexual activity: Not Currently       ROS   Review of Systems   Constitutional:  Negative for chills and fever.   HENT:  Negative for sore throat.    Respiratory:  Negative for shortness of breath.    Cardiovascular:  Negative for chest pain.   Gastrointestinal:  Positive for diarrhea. Negative for nausea and vomiting.   Genitourinary:  Negative for dysuria.   Musculoskeletal:  Positive for arthralgias (L knee). Negative for back pain.   Skin:  Negative for rash.   Neurological:  Positive for weakness (BLE, intermittent). Negative for dizziness, facial asymmetry, speech difficulty, numbness and headaches.   Hematological:  Does not bruise/bleed easily.   All other systems reviewed and are negative.    Physical Exam      Initial Vitals   BP Pulse Resp Temp SpO2   05/10/23 1624 05/10/23 1620 05/10/23 1620 05/10/23 1620 05/10/23 1620   (!) 87/44 92 16 98.3 °F (36.8 °C) 100 %      MAP       --                 Physical Exam  Nursing Notes and Vital Signs Reviewed.  Constitutional: Patient is in no acute distress. Well-developed and well-nourished.  Head: Atraumatic. Normocephalic.  Eyes: PERRL. EOM intact. Conjunctivae are not pale. No scleral icterus.  ENT: Mucous membranes are moist. Oropharynx is clear and symmetric.    Neck: Supple. Full ROM.   Cardiovascular: Regular rate. Regular rhythm. No murmurs, rubs, or gallops. Distal pulses are 2+ and symmetric.  Pulmonary/Chest: No respiratory distress. Clear to auscultation bilaterally. No wheezing or rales.  Abdominal: Soft and non-distended.  There is no tenderness.  No rebound, guarding, or rigidity.   Musculoskeletal: Moves all extremities. No obvious deformities. No edema. Able to get up off the bed and walk unassisted.   Skin: Warm and dry.  Neurological: Patient is alert and oriented to person, place and time. Cranial nerves II-XII are intact.  Strength is full bilaterally; it is equal and 5/5 in bilateral upper and lower extremities. There is no pronator drift of outstretched arms. Light touch sense is intact. Speech is clear and normal. No acute focal neurological deficits noted.  Psychiatric: Normal affect. Good eye contact. Appropriate in content.    ED Course    Critical Care    Date/Time: 5/10/2023 11:17 PM  Performed by: Emma Sheehan MD  Authorized by: Emma Sheehan MD   Direct patient critical care time: 13 minutes  Ordering / reviewing critical care time: 5 minutes  Documentation critical care time: 5 minutes  Consulting other physicians critical care time: 7 minutes  Other critical care time: 10 minutes  Total critical care time (exclusive of procedural time) : 40 minutes  Critical care time was exclusive of separately billable procedures and treating other patients and teaching time.  Critical care was necessary to treat or prevent imminent or life-threatening deterioration of the following conditions: CKD.  Critical care was time spent personally by me on the following activities: blood draw for specimens, discussions with consultants, interpretation of cardiac output measurements, evaluation of patient's response to treatment, examination of patient, obtaining history from patient or surrogate, ordering and performing treatments and interventions, ordering and review of laboratory studies, ordering and review of radiographic studies, pulse oximetry, re-evaluation of patient's condition, review of old charts and development of treatment plan with patient or surrogate.      ED Vital Signs:  Vitals:    05/10/23 1635 05/10/23 1636 05/10/23 1640 05/10/23 1649   BP:   (!) 114/56    Pulse: 70  62 63   Resp: 16  (!) 22    Temp:       TempSrc:       SpO2: 99%  99%    Weight:  107.8 kg (237 lb 11.2 oz)     Height:        05/10/23 1700 05/10/23 1800 05/10/23 1830 05/10/23 1900   BP: (!) 110/53 (!) 114/57 (!) 110/56 (!) 96/51   Pulse: 61 61 60 60   Resp:  "20 16 20 (!) 21   Temp:       TempSrc:       SpO2: 98% 96% 98% 98%   Weight:       Height:        05/10/23 1930 05/10/23 2000 05/10/23 2100 05/10/23 2130   BP: (!) 109/55 (!) 112/57 134/60 125/65   Pulse: 63 60 61 62   Resp: (!) 26 (!) 23 (!) 24 (!) 27   Temp:       TempSrc:       SpO2: 97% 97% 97% 99%   Weight:       Height:        05/10/23 2200 05/11/23 0050 05/11/23 0435   BP: (!) 142/62 132/62 (!) 121/57   Pulse: 65 63 65   Resp: (!) 24 18 17   Temp:  98.9 °F (37.2 °C) 98.9 °F (37.2 °C)   TempSrc:  Oral Oral   SpO2: 98% 95% 96%   Weight:  107.8 kg (237 lb 11.2 oz) 95.7 kg (210 lb 15.7 oz)   Height:  5' 8" (1.727 m)        Abnormal Lab Results:  Labs Reviewed   CBC W/ AUTO DIFFERENTIAL - Abnormal; Notable for the following components:       Result Value    RBC 2.52 (*)     Hemoglobin 7.8 (*)     Hematocrit 25.3 (*)      (*)     MCHC 30.8 (*)     RDW 14.7 (*)     Immature Granulocytes 0.6 (*)     Gran # (ANC) 8.1 (*)     Immature Grans (Abs) 0.06 (*)     Lymph # 0.6 (*)     Mono # 1.5 (*)     Gran % 78.4 (*)     Lymph % 5.9 (*)     All other components within normal limits   COMPREHENSIVE METABOLIC PANEL - Abnormal; Notable for the following components:    CO2 17 (*)     Glucose 134 (*)     BUN 91 (*)     Creatinine 3.5 (*)     Calcium 8.5 (*)     Albumin 3.2 (*)     Anion Gap 18 (*)     eGFR 17 (*)     All other components within normal limits   TROPONIN I - Abnormal; Notable for the following components:    Troponin I 0.250 (*)     All other components within normal limits   B-TYPE NATRIURETIC PEPTIDE - Abnormal; Notable for the following components:     (*)     All other components within normal limits   URINALYSIS, REFLEX TO URINE CULTURE - Abnormal; Notable for the following components:    Protein, UA Trace (*)     All other components within normal limits    Narrative:     Specimen Source->Urine   TROPONIN I - Abnormal; Notable for the following components:    Troponin I 0.239 (*)     All other " components within normal limits   LACTIC ACID, PLASMA        All Lab Results:  Results for orders placed or performed during the hospital encounter of 05/10/23   CBC auto differential   Result Value Ref Range    WBC 10.29 3.90 - 12.70 K/uL    RBC 2.52 (L) 4.60 - 6.20 M/uL    Hemoglobin 7.8 (L) 14.0 - 18.0 g/dL    Hematocrit 25.3 (L) 40.0 - 54.0 %     (H) 82 - 98 fL    MCH 31.0 27.0 - 31.0 pg    MCHC 30.8 (L) 32.0 - 36.0 g/dL    RDW 14.7 (H) 11.5 - 14.5 %    Platelets 183 150 - 450 K/uL    MPV 10.2 9.2 - 12.9 fL    Immature Granulocytes 0.6 (H) 0.0 - 0.5 %    Gran # (ANC) 8.1 (H) 1.8 - 7.7 K/uL    Immature Grans (Abs) 0.06 (H) 0.00 - 0.04 K/uL    Lymph # 0.6 (L) 1.0 - 4.8 K/uL    Mono # 1.5 (H) 0.3 - 1.0 K/uL    Eos # 0.0 0.0 - 0.5 K/uL    Baso # 0.02 0.00 - 0.20 K/uL    nRBC 0 0 /100 WBC    Gran % 78.4 (H) 38.0 - 73.0 %    Lymph % 5.9 (L) 18.0 - 48.0 %    Mono % 14.8 4.0 - 15.0 %    Eosinophil % 0.1 0.0 - 8.0 %    Basophil % 0.2 0.0 - 1.9 %    Differential Method Automated    Comprehensive metabolic panel   Result Value Ref Range    Sodium 137 136 - 145 mmol/L    Potassium 3.8 3.5 - 5.1 mmol/L    Chloride 102 95 - 110 mmol/L    CO2 17 (L) 23 - 29 mmol/L    Glucose 134 (H) 70 - 110 mg/dL    BUN 91 (H) 8 - 23 mg/dL    Creatinine 3.5 (H) 0.5 - 1.4 mg/dL    Calcium 8.5 (L) 8.7 - 10.5 mg/dL    Total Protein 7.2 6.0 - 8.4 g/dL    Albumin 3.2 (L) 3.5 - 5.2 g/dL    Total Bilirubin 0.3 0.1 - 1.0 mg/dL    Alkaline Phosphatase 69 55 - 135 U/L    AST 34 10 - 40 U/L    ALT 20 10 - 44 U/L    Anion Gap 18 (H) 8 - 16 mmol/L    eGFR 17 (A) >60 mL/min/1.73 m^2   Troponin I   Result Value Ref Range    Troponin I 0.250 (H) 0.000 - 0.026 ng/mL   Brain natriuretic peptide   Result Value Ref Range     (H) 0 - 99 pg/mL   Urinalysis, Reflex to Urine Culture Urine, Clean Catch    Specimen: Urine   Result Value Ref Range    Specimen UA Urine, Clean Catch     Color, UA Yellow Yellow, Straw, Beth    Appearance, UA Clear Clear     pH, UA 5.0 5.0 - 8.0    Specific Gravity, UA 1.020 1.005 - 1.030    Protein, UA Trace (A) Negative    Glucose, UA Negative Negative    Ketones, UA Negative Negative    Bilirubin (UA) Negative Negative    Occult Blood UA Negative Negative    Nitrite, UA Negative Negative    Urobilinogen, UA Negative <2.0 EU/dL    Leukocytes, UA Negative Negative   Lactic acid, plasma   Result Value Ref Range    Lactate (Lactic Acid) 1.6 0.5 - 2.2 mmol/L   Troponin I   Result Value Ref Range    Troponin I 0.239 (H) 0.000 - 0.026 ng/mL     *Note: Due to a large number of results and/or encounters for the requested time period, some results have not been displayed. A complete set of results can be found in Results Review.     Imaging Results:  Imaging Results              CT Hip Without Contrast Left (Final result)  Result time 05/10/23 22:44:56      Final result by Jack Suresh MD (05/10/23 22:44:56)                   Impression:      No evidence for hip fracture or dislocation.  Senescent changes    All CT scans   are performed using dose optimization techniques including the following: automated exposure control; adjustment of the mA and/or kV; use of iterative reconstruction technique.  Dose modulation was employed for ALARA by means of: Automated exposure control; adjustment of the mA and/or kV according to patient size (this includes techniques or standardized protocols for targeted exams where dose is matched to indication/reason for exam; i.e. extremities or head); and/or use of iterative reconstructive technique.      Electronically signed by: Jack Suresh  Date:    05/10/2023  Time:    22:44               Narrative:    EXAMINATION:  CT HIP WITHOUT CONTRAST LEFT    CLINICAL HISTORY:  Hip trauma, fracture suspected, no prior imaging;    TECHNIQUE:  CT of the left hip with sagittal and coronal reformations without contrast.    COMPARISON:  Prior radiograph    FINDINGS:  No acute fracture or dislocation.  Degenerative joint  disease of the hip and to a less extent femoroacetabular joint.  Superior pubic rami inferior pubic rami are intact.  Pubic symphysis demonstrates degenerative joint disease.  Fat containing anterior muscular bundle.                                       CT Lumbar Spine Without Contrast (Final result)  Result time 05/10/23 22:47:42      Final result by Jack Suresh MD (05/10/23 22:47:42)                   Impression:      No acute fracture or dislocation    Extensive degenerative joint disease    Atherosclerotic disease    Spinal stimulator device.  Correlate clinically    Prior postsurgical changes including laminectomy at L4-L5 and prior posterior fixation at L2-L3.  Correlate to surgical history.    All CT scans at this facility are performed  using dose modulation techniques as appropriate to performed exam including the following:  automated exposure control; adjustment of mA and/or kV according to the patients size (this includes techniques or standardized protocols for targeted exams where dose is matched to indication/reason for exam: i.e. extremities or head);  iterative reconstruction technique.      Electronically signed by: Jack Suresh  Date:    05/10/2023  Time:    22:47               Narrative:    EXAMINATION:  CT LUMBAR SPINE WITHOUT CONTRAST    CLINICAL HISTORY:  Low back pain, trauma;    TECHNIQUE:  CT lumbar spine without    COMPARISON:  None    FINDINGS:  No vertebral body compression deformity.  Spinal stimulator device noted.  Moderate severe multilevel degenerative disc disease.  Fusion of L3 and L2 with suggestion of prior posterior fixation.  Laminectomy at L4-L5.  Atherosclerotic changes of the aorta iliac vasculature.  No acute fracture or dislocation.  Decreased bone mineral density.  Mild posterior subcutaneous fat stranding along the postoperative bed.                                       CT Head Without Contrast (Final result)  Result time 05/10/23 18:25:09      Final result by  Jack Suresh MD (05/10/23 18:25:09)                   Impression:      No acute abnormality.    Atrophy and chronic white matter changes    All CT scans   are performed using dose optimization techniques including the following: automated exposure control; adjustment of the mA and/or kV; use of iterative reconstruction technique.  Dose modulation was employed for ALARA by means of: Automated exposure control; adjustment of the mA and/or kV according to patient size (this includes techniques or standardized protocols for targeted exams where dose is matched to indication/reason for exam; i.e. extremities or head); and/or use of iterative reconstructive technique.      Electronically signed by: Jack Suresh  Date:    05/10/2023  Time:    18:25               Narrative:    EXAMINATION:  CT HEAD WITHOUT CONTRAST    CLINICAL HISTORY:  Head trauma, minor (Age >= 65y);    TECHNIQUE:  Low dose axial CT images obtained throughout the head without intravenous contrast. Sagittal and coronal reconstructions were performed.    COMPARISON:  None.    FINDINGS:  Atrophy and chronic white matter changes    Ventricles and sulci are normal in size for age without evidence of hydrocephalus. No extra-axial blood or fluid collections.    No parenchymal mass, hemorrhage, edema or major vascular distribution infarct.    Skull/extracranial contents (limited evaluation): No fracture. Mastoid air cells and paranasal sinuses are essentially clear.                                       X-Ray Chest AP Portable (Final result)  Result time 05/10/23 18:02:48      Final result by Jack Suresh MD (05/10/23 18:02:48)                   Impression:      As above      Electronically signed by: Jack Sruesh  Date:    05/10/2023  Time:    18:02               Narrative:    EXAMINATION:  XR CHEST AP PORTABLE    CLINICAL HISTORY:  Hypotension, unspecified    TECHNIQUE:  Single frontal view of the chest was  performed.    COMPARISON:  None    FINDINGS:  Left chest biventricular pacing.  Improved perihilar bronchovascular crowding compared to prior exam.    Cardiomegaly the hilar and mediastinal contours are unremarkable.    Bones are intact..  Senescent changes.  Mild basilar atelectasis.                                       X-Ray Knee 3 View Left (Final result)  Result time 05/10/23 18:05:45      Final result by Jack Suresh MD (05/10/23 18:05:45)                   Impression:      No acute fracture or dislocation.      Electronically signed by: Jack Suresh  Date:    05/10/2023  Time:    18:05               Narrative:    EXAMINATION:  XR KNEE 3 VIEW LEFT    CLINICAL HISTORY:  Pain in left knee    TECHNIQUE:  AP, lateral, and Merchant views of the left knee were performed.    COMPARISON:  None    FINDINGS:  No acute fracture or dislocation.  Postsurgical changes around the medial aspect of the knee atherosclerotic changes.  No acute fracture or dislocation.  Minimal chondrocalcinosis.  Suspect minimal degenerative joint disease.                                     The EKG was ordered, reviewed, and independently interpreted by the ED provider.  Interpretation time: 17:33  Rate: 62 BPM  Rhythm: Ventricular-paced rhythm  Interpretation: Biventricular pacemaker detected. No STEMI.           The Emergency Provider reviewed the vital signs and test results, which are outlined above.    ED Discussion     6:41 PM: Discussed pt's case with Joann Membreno NP ([facility]) who recommends brain MRI from the ED.    8:00 PM: Dr. Gilbert transfers care of patient to Dr. Sheehan pending lab and imaging results.    10:25 PM: Discussed case with Carlos Britton MD ([facility]) and he agrees with current care and management of pt and accepts admission.   Admitting Service: Hospital Medicine  Admitting Physician: Dr. Britton  Admit to: inpatient med/tele     10:25  PM: Re-evaluated pt. I have discussed test results,  shared treatment plan, and the need for admission with patient and family at bedside. Pt and family express understanding at this time and agree with all information. All questions answered. Pt and family have no further questions or concerns at this time. Pt is ready for admit.           ED Medication(s):  Medications   sodium chloride 0.9% flush 3 mL (has no administration in time range)   melatonin tablet 6 mg (has no administration in time range)   ondansetron injection 4 mg (has no administration in time range)   promethazine tablet 25 mg (has no administration in time range)   senna-docusate 8.6-50 mg per tablet 1 tablet (has no administration in time range)   acetaminophen tablet 650 mg (has no administration in time range)   aluminum-magnesium hydroxide-simethicone 200-200-20 mg/5 mL suspension 30 mL (has no administration in time range)   acetaminophen suppository 650 mg (has no administration in time range)   HYDROcodone-acetaminophen 5-325 mg per tablet 1 tablet (has no administration in time range)   morphine injection 2 mg (has no administration in time range)   naloxone 0.4 mg/mL injection 0.02 mg (has no administration in time range)   glucagon (human recombinant) injection 1 mg (has no administration in time range)   dextrose 10% bolus 125 mL 125 mL (has no administration in time range)   dextrose 10% bolus 250 mL 250 mL (has no administration in time range)   dextrose 40 % gel 15,000 mg (has no administration in time range)   dextrose 40 % gel 30,000 mg (has no administration in time range)   apixaban tablet 2.5 mg (has no administration in time range)   atorvastatin tablet 80 mg (has no administration in time range)   clonazePAM tablet 1 mg (has no administration in time range)   furosemide tablet 40 mg (has no administration in time range)   losartan tablet 50 mg (has no administration in time range)   pantoprazole EC tablet 40 mg (has no administration in time range)   potassium chloride SA CR  tablet 20 mEq (has no administration in time range)   pregabalin capsule 75 mg (has no administration in time range)   spironolactone tablet 25 mg (has no administration in time range)   sodium chloride 0.9% bolus 250 mL 250 mL (0 mLs Intravenous Stopped 5/10/23 8264)       Current Discharge Medication List          Medical Decision Making    Medical Decision Making:   Clinical Tests:   Lab Tests: Ordered and Reviewed  Radiological Study: Ordered and Reviewed  Medical Tests: Ordered and Reviewed         Scribe Attestation:   Scribe #1: I performed the above scribed service and the documentation accurately describes the services I performed. I attest to the accuracy of the note.    Attending:   Physician Attestation Statement for Scribe #1: I, Tacho Gilbert MD, personally performed the services described in this documentation, as scribed by Catarino Campa, in my presence, and it is both accurate and complete.       Scribe Attestation:   Scribe #2: I performed the above scribed service and the documentation accurately describes the services I performed. I attest to the accuracy of the note.    Attending Attestation:           Physician Attestation for Scribe:    Physician Attestation Statement for Scribe #2: I, Emma Sheehan MD, reviewed documentation, as scribed by Sindy Clark in my presence, and it is both accurate and complete. I also acknowledge and confirm the content of the note done by Scribe #1.        Clinical Impression       ICD-10-CM ICD-9-CM   1. Acute renal failure superimposed on chronic kidney disease, unspecified CKD stage, unspecified acute renal failure type  N17.9 584.9    N18.9 585.9   2. Weakness  R53.1 780.79   3. Left knee pain  M25.562 719.46   4. Hypotension  I95.9 458.9   5. Fall, initial encounter  W19.XXXA E888.9   6. Combined systolic and diastolic congestive heart failure, unspecified HF chronicity  I50.40 428.40   7. Chest pain  R07.9 786.50       Disposition:   Disposition:  Admitted  Condition: Serious       Si MANUEL Sheehan MD  05/11/23 0511

## 2023-05-11 PROBLEM — R29.898 WEAKNESS OF BOTH LOWER EXTREMITIES: Status: ACTIVE | Noted: 2023-01-01

## 2023-05-11 PROBLEM — E66.09 CLASS 1 OBESITY DUE TO EXCESS CALORIES WITH SERIOUS COMORBIDITY AND BODY MASS INDEX (BMI) OF 32.0 TO 32.9 IN ADULT: Status: ACTIVE | Noted: 2023-01-01

## 2023-05-11 PROBLEM — N18.30 ACUTE RENAL FAILURE SUPERIMPOSED ON STAGE 3 CHRONIC KIDNEY DISEASE: Status: ACTIVE | Noted: 2020-09-18

## 2023-05-11 NOTE — ASSESSMENT & PLAN NOTE
Not presently in acute exacerbation and is without signs/symptoms of distress. Patient currently saturating  100% on room air.  Plan:  -Continue home medications, titrate as needed  -Titrate oxygen requirements as needed  -Incentive spirometry   -Monitor pulse oximetry  -Joan coyn

## 2023-05-11 NOTE — PLAN OF CARE
Pt Awake, Alert, and oriented to person and place remains free from falls/injuries this shift. Advised pts wife at bedside not to turn off bed alarm, as I walked in on her attempting to get pt out of bed, educated her on the importance of having staff assist pt to bedside commode to prevent falls or injury to both pt and pts wife. Pts wife verbalized understanding. Safety precautions maintained. Pain managed with pain medication, rest, and elevation. No s/s of acute distress noted. Continue with plan of care. Chart check complete.

## 2023-05-11 NOTE — ASSESSMENT & PLAN NOTE
Chronic. Stable. Currently asymptomatic. Home medications include PPI/Antacids as needed.  Plan:  -Continue PPI/Antacids as needed

## 2023-05-11 NOTE — ASSESSMENT & PLAN NOTE
Patient currently follows Dr. Hood and is currently without chest pain or shortness of breath. Troponin was noted to be elevated at 0.250 with repeat 0.239 in absence of chest pain.  EKG showed paced rhythm.  Elevation likely secondary to VAIBHAV but will continue to monitor to rule out ACS.  Current hemoglobin measuring 7.8 and is below threshold for transfusion in known cardiac patient.  Will type/strain and prepare 1 unit but hold transfusion following repeat H/H this morning.  Plan:  -telemetry  -trend troponin  -f/u labs  -type/screen, transfuse if repeat hemoglobin less than 8  -serial EKGs p.r.n. chest pain  -continue home medications, titrate as needed  -RAMIREZ therapy p.r.n.

## 2023-05-11 NOTE — SUBJECTIVE & OBJECTIVE
Review of Systems   All other systems reviewed and are negative.  Objective:     Vital Signs (Most Recent):  Temp: 98.2 °F (36.8 °C) (05/11/23 1341)  Pulse: 66 (05/11/23 1341)  Resp: 18 (05/11/23 1341)  BP: (!) 144/64 (05/11/23 1341)  SpO2: 97 % (05/11/23 1341) Vital Signs (24h Range):  Temp:  [98.2 °F (36.8 °C)-99.4 °F (37.4 °C)] 98.2 °F (36.8 °C)  Pulse:  [60-92] 66  Resp:  [16-27] 18  SpO2:  [93 %-100 %] 97 %  BP: ()/(44-65) 144/64     Weight: 95.7 kg (210 lb 15.7 oz)  Body mass index is 32.08 kg/m².  No intake or output data in the 24 hours ending 05/11/23 1433      Physical Exam  Constitutional:       General: He is not in acute distress.     Appearance: He is obese. He is ill-appearing.   Cardiovascular:      Rate and Rhythm: Normal rate and regular rhythm.      Heart sounds: No murmur heard.  Pulmonary:      Effort: Pulmonary effort is normal. No respiratory distress.      Breath sounds: Normal breath sounds. No wheezing.   Abdominal:      General: There is distension.      Palpations: Abdomen is soft.      Tenderness: There is no abdominal tenderness.   Musculoskeletal:         General: No tenderness, deformity or signs of injury.      Right lower leg: Edema present.      Left lower leg: Edema present.   Skin:     Coloration: Skin is pale.      Findings: No erythema, lesion or rash.   Neurological:      Mental Status: He is alert.           Significant Labs: All pertinent labs within the past 24 hours have been reviewed.  CBC:   Recent Labs   Lab 05/10/23  1732 05/11/23  0634   WBC 10.29 10.08   HGB 7.8* 7.2*   HCT 25.3* 23.3*    177     CMP:   Recent Labs   Lab 05/10/23  1732 05/11/23  0634    136   K 3.8 4.0    104   CO2 17* 18*   * 151*   BUN 91* 102*   CREATININE 3.5* 3.5*   CALCIUM 8.5* 8.5*   PROT 7.2 6.6   ALBUMIN 3.2* 2.9*   BILITOT 0.3 0.4   ALKPHOS 69 63   AST 34 28   ALT 20 19   ANIONGAP 18* 14       Significant Imaging: I have reviewed all pertinent imaging  results/findings within the past 24 hours.    CT Hip Without Contrast Left   Final Result      No evidence for hip fracture or dislocation.  Senescent changes      All CT scans   are performed using dose optimization techniques including the following: automated exposure control; adjustment of the mA and/or kV; use of iterative reconstruction technique.  Dose modulation was employed for ALARA by means of: Automated exposure control; adjustment of the mA and/or kV according to patient size (this includes techniques or standardized protocols for targeted exams where dose is matched to indication/reason for exam; i.e. extremities or head); and/or use of iterative reconstructive technique.         Electronically signed by: Jack Suresh   Date:    05/10/2023   Time:    22:44      CT Lumbar Spine Without Contrast   Final Result      No acute fracture or dislocation      Extensive degenerative joint disease      Atherosclerotic disease      Spinal stimulator device.  Correlate clinically      Prior postsurgical changes including laminectomy at L4-L5 and prior posterior fixation at L2-L3.  Correlate to surgical history.      All CT scans at this facility are performed  using dose modulation techniques as appropriate to performed exam including the following:  automated exposure control; adjustment of mA and/or kV according to the patients size (this includes techniques or standardized protocols for targeted exams where dose is matched to indication/reason for exam: i.e. extremities or head);  iterative reconstruction technique.         Electronically signed by: Jack Suresh   Date:    05/10/2023   Time:    22:47      CT Head Without Contrast   Final Result      No acute abnormality.      Atrophy and chronic white matter changes      All CT scans   are performed using dose optimization techniques including the following: automated exposure control; adjustment of the mA and/or kV; use of iterative reconstruction technique.   Dose modulation was employed for ALARA by means of: Automated exposure control; adjustment of the mA and/or kV according to patient size (this includes techniques or standardized protocols for targeted exams where dose is matched to indication/reason for exam; i.e. extremities or head); and/or use of iterative reconstructive technique.         Electronically signed by: Jack Suresh   Date:    05/10/2023   Time:    18:25      X-Ray Chest AP Portable   Final Result      As above         Electronically signed by: Jack Suresh   Date:    05/10/2023   Time:    18:02      X-Ray Knee 3 View Left   Final Result      No acute fracture or dislocation.         Electronically signed by: Jack Suresh   Date:    05/10/2023   Time:    18:05      MRI Lumbar Spine W WO Cont    (Results Pending)

## 2023-05-11 NOTE — H&P
[facility] Medicine  History & Physical    Patient Name: Jake Ortiz  MRN: [id number]  Patient Class: IP- Inpatient  Admission Date: 5/10/2023  Attending Physician: Carlos Britton MD   Primary Care Provider: Byron Stevens MD         Patient information was obtained from patient, past medical records and ER records.     Subjective:     Principal Problem:<principal problem not specified>    Chief Complaint:   Chief Complaint   Patient presents with    Weakness     Weakness to his legs x two days. No reported pain. Pt has a spinal stimulator implant. EMS reports hypotension         HPI: Jake Ortiz is a 82 y.o. 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 who presented to the ED for worsening bilateral lower extremity weakness x2 days duration.  Patient reported upon waking earlier this morning, his legs gave out causing him to fall on his left knee has been unable to walk/stand since then.  Patient denied endorsing any head trauma, loss of consciousness, or sustaining any other injuries in his only complaining of left knee pain described as hurting/sore in nature, constant, currently rated 5/10 in severity with aggravating factors including weight-bearing, movement, and palpation to the affected area.  He denied endorsing any lower back pain, hip pain, numbness/tingling of his lower extremities, bowel/bladder incontinence, and reported all other review of systems negative except as noted above.  Of note, patient did have his nerve stimulator manipulated yesterday and reported being  in his usual state of health prior to onset of symptoms.  Initial workup in the ED revealed CT lumbar spine and hip positive for degenerative disease but negative for evidence of fractures or dislocations.  Ortho/spine was consulted by ED staff rolling further imaging via MRI with recommendations to admit to hospital medicine for continued medical management and treatment/workup of metabolic causes as well as PT/OT evaluation inpatient will be evaluated in the morning.      PCP: Byron Stevens        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    #4 PROSTATE BIOPSY, LEFT APEX: ADENOCARCINOMA, FARHAD GRADE 3 + 3 = 6, IN 1 of 2 CORES 9/8/2014    Anemia     Arthritis     Atrial fibrillation 10/19/2017    Back pain     Congestive heart failure 03/05/2018    Coronary artery disease     DM (diabetes mellitus) 2018     am 06/23/2020    DM (diabetes mellitus) 2016     am 08/17/2021    Hyperlipemia     Hypertension     Myocardial infarction     NASREEN (obstructive sleep apnea) 06/05/2018    Prostate cancer 2015    Tobacco dependence     Type 2 diabetes mellitus        Past Surgical History:   Procedure Laterality Date    COLONOSCOPY N/A 9/22/2020    Procedure: COLONOSCOPY;  Surgeon: Javier Farmer MD;  Location: [facility] ENDO;  Service: Endoscopy;  Laterality: N/A;    CORONARY ARTERY BYPASS GRAFT  1987    EPIDURAL STEROID INJECTION N/A 11/22/2019    Procedure: Lumbar L5/S1 IL XUAN;  Surgeon: Tacho Trivedi MD;  Location: [facility] PAIN MGT;  Service: Pain Management;  Laterality: N/A;    EPIDURAL STEROID INJECTION N/A 1/6/2020    Procedure: Lumbar L5/S1 IL XUAN;  Surgeon: Tacho Trivedi MD;  Location: [facility] PAIN MGT;  Service: Pain Management;  Laterality: N/A;    EPIDURAL STEROID INJECTION N/A 2/10/2020    Procedure: Caudal XUAN;  Surgeon: Tacho Trivedi MD;  Location: [facility] PAIN MGT;  Service: Pain Management;  Laterality: N/A;     ESOPHAGOGASTRODUODENOSCOPY N/A 9/22/2020    Procedure: EGD (ESOPHAGOGASTRODUODENOSCOPY);  Surgeon: Javier Farmer MD;  Location: [facility] ENDO;  Service: Endoscopy;  Laterality: N/A;    INJECTION OF ANESTHETIC AGENT AROUND MEDIAL BRANCH NERVES INNERVATING LUMBAR FACET JOINT Bilateral 10/12/2020    Procedure: Bilateral L3-5 MBB;  Surgeon: Tacho Trivedi MD;  Location: [facility] PAIN MGT;  Service: Pain Management;  Laterality: Bilateral;    INJECTION OF ANESTHETIC AGENT AROUND MEDIAL BRANCH NERVES INNERVATING LUMBAR FACET JOINT Bilateral 12/21/2020    Procedure: Bilateral L3-5 MBB with steroid;  Surgeon: Tacho Trivedi MD;  Location: [facility] PAIN MGT;  Service: Pain Management;  Laterality: Bilateral;    INSERTION OF SPINAL NEUROSTIMULATOR N/A 3/23/2023    Procedure: INSERTION, NEUROSTIMULATOR, SPINAL;  Surgeon: Philipp Demarco MD;  Location: [facility] OR;  Service: Pain Management;  Laterality: N/A;    INTRALUMINAL GASTROINTESTINAL TRACT IMAGING VIA CAPSULE N/A 12/29/2021    Procedure: IMAGING PROCEDURE, GI TRACT, INTRALUMINAL, VIA CAPSULE;  Surgeon: Tahir Geronimo RN;  Location: [facility] ENDO;  Service: Endoscopy;  Laterality: N/A;    PROSTATE SURGERY      prostate radiation    RADIOFREQUENCY THERMOCOAGULATION Left 6/4/2021    Procedure: Left L3-5 Lumbar RFA;  Surgeon: Tacho Trivedi MD;  Location: [facility] PAIN MGT;  Service: Pain Management;  Laterality: Left;    RADIOFREQUENCY THERMOCOAGULATION Right 6/18/2021    Procedure: Right L3-5 Lumbar RFA;  Surgeon: Tacho Trivedi MD;  Location: [facility] PAIN MGT;  Service: Pain Management;  Laterality: Right;    REPLACEMENT OF PACEMAKER GENERATOR Left 6/16/2022    Procedure: REPLACEMENT, PULSE GENERATOR, CARDIAC PACEMAKER/CRT-P device;  Surgeon: Darrel Carrero MD;  Location: [facility] CATH LAB;  Service: Cardiology;  Laterality: Left;  NOT MRI safe   Pacer and leads implanted 9/30/2017, Dr. Souleymane CARROLLT Consulta CRT-P [id number], [id number]   A lead: Mdvaibhav [address]76 CapSureFix®  [place], [id number]   RV lead: Jovan 5036 CapSureFix® [place], [id number]   LV lead: Jovan 4196 At    SELECTIVE INJECTION OF ANESTHETIC AGENT AROUND LUMBAR SPINAL NERVE ROOT BY TRANSFORAMINAL APPROACH Bilateral 6/8/2022    Procedure: Bilateral L3/4 TF XUAN with RN IV sedation;  Surgeon: Philipp Demarco MD;  Location: [facility] PAIN MGT;  Service: Pain Management;  Laterality: Bilateral;    SPINE SURGERY      fusion    TONSILLECTOMY      TRIAL OF SPINAL CORD NERVE STIMULATOR N/A 1/25/2023    Procedure: Trial, Neurostimulator, Spinal Cord with RN IV sedation;  Surgeon: Philipp Demarco MD;  Location: [facility] PAIN MGT;  Service: Pain Management;  Laterality: N/A;       Review of patient's allergies indicates:  No Known Allergies    Current Facility-Administered Medications on File Prior to Encounter   Medication    ondansetron injection 4 mg     Current Outpatient Medications on File Prior to Encounter   Medication Sig    acetaminophen (TYLENOL) 500 MG tablet Take 2 tablets (1,000 mg total) by mouth every 6 (six) hours as needed for Pain.    apixaban (ELIQUIS) 2.5 mg Tab Take 1 tablet (2.5 mg total) by mouth 2 (two) times daily.    atorvastatin (LIPITOR) 80 MG tablet Take 1 tablet (80 mg total) by mouth every evening.    cefadroxil (DURICEF) 500 MG Cap Take 1 capsule (500 mg total) by mouth every 12 (twelve) hours.    cholecalciferol, vitamin D3, (VITAMIN D3) 25 mcg (1,000 unit) capsule Take 1,000 Units by mouth once daily.    furosemide (LASIX) 40 MG tablet Take 1 tablet (40 mg total) by mouth 2 (two) times daily. Can double to 2 tablets twice a day for 3 days for more swelling/fluid build up - take 80mg twice a day    krill/om-3/dha/epa/phospho/ast (MEGARED OMEGA-3 KRILL OIL ORAL) Take 1 capsule by mouth every morning.    levocetirizine (XYZAL) 5 MG tablet Take 1 tablet (5 mg total) by mouth every evening.    losartan (COZAAR) 50 MG tablet Take 1 tablet (50 mg total) by mouth once daily.    multivitamin capsule Take 1  capsule by mouth once daily. Takes once a week    pantoprazole (PROTONIX) 40 MG tablet Take 1 tablet (40 mg total) by mouth once daily.    potassium chloride SA (K-DUR,KLOR-CON) 20 MEQ tablet Take 1 tablet (20 mEq total) by mouth once daily.    pregabalin (LYRICA) 75 MG capsule Take 1 capsule QHS x 1 week, then increase to BID (if tolerated).    pyridoxine, vitamin B6, (B-6) 100 MG Tab Take 50 mg by mouth once daily.    spironolactone (ALDACTONE) 25 MG tablet Take 1 tablet (25 mg total) by mouth once daily.    vit A/vit C/vit E/zinc/copper (OCUVITE PRESERVISION ORAL) Take 1 capsule by mouth every evening.    blood sugar diagnostic Strp Check blood glucose levels daily in the AM fasting and 1-2 times more daily.    clonazePAM (KLONOPIN) 1 MG tablet Take 1 tablet (1 mg total) by mouth nightly as needed for Anxiety.    fluticasone propionate (FLONASE) 50 mcg/actuation nasal spray 2 sprays (100 mcg total) by Each Nostril route once daily.    lancets Misc Check blood glucose levels daily in the AM fasting and 1-2 times more daily.     Family History       Problem Relation (Age of Onset)    Cataracts Mother    Diabetes Mother    Heart attack Mother    Heart disease Mother, Father, Brother    Stroke Father          Tobacco Use    Smoking status: Former     Packs/day: 1.50     Years: 20.00     Pack years: 30.00     Types: Cigarettes     Start date:      Quit date:      Years since quittin.3    Smokeless tobacco: Never   Substance and Sexual Activity    Alcohol use: No    Drug use: No    Sexual activity: Not Currently     Review of Systems   All other systems reviewed and are negative.  Objective:     Vital Signs (Most Recent):  Temp: 98.9 °F (37.2 °C) (23)  Pulse: 63 (23)  Resp: 18 (23)  BP: 132/62 (23)  SpO2: 95 % (23) Vital Signs (24h Range):  Temp:  [98.3 °F (36.8 °C)-98.9 °F (37.2 °C)] 98.9 °F (37.2 °C)  Pulse:  [60-92] 63  Resp:  [16-27]  18  SpO2:  [95 %-100 %] 95 %  BP: ()/(44-65) 132/62     Weight: 107.8 kg (237 lb 11.2 oz)  Body mass index is 36.14 kg/m².     Physical Exam  Vitals reviewed.   Constitutional:       General: He is not in acute distress.     Appearance: Normal appearance. He is obese. He is not ill-appearing, toxic-appearing or diaphoretic.   HENT:      Head: Normocephalic and atraumatic.      Right Ear: External ear normal.      Left Ear: External ear normal.      Nose: Nose normal. No congestion or rhinorrhea.      Mouth/Throat:      Mouth: Mucous membranes are moist.      Pharynx: Oropharynx is clear. No oropharyngeal exudate or posterior oropharyngeal erythema.   Eyes:      General: No scleral icterus.     Extraocular Movements: Extraocular movements intact.      Conjunctiva/sclera: Conjunctivae normal.      Pupils: Pupils are equal, round, and reactive to light.   Neck:      Vascular: No carotid bruit.   Cardiovascular:      Rate and Rhythm: Normal rate and regular rhythm.      Pulses: Normal pulses.      Heart sounds: Murmur heard.     No friction rub. No gallop.   Pulmonary:      Effort: Pulmonary effort is normal. No respiratory distress.      Breath sounds: Normal breath sounds. No stridor. No wheezing, rhonchi or rales.   Chest:      Chest wall: No tenderness.   Abdominal:      General: Abdomen is flat. Bowel sounds are normal. There is no distension.      Palpations: Abdomen is soft. There is no mass.      Tenderness: There is no abdominal tenderness. There is no guarding or rebound.      Hernia: No hernia is present.   Musculoskeletal:         General: No swelling, tenderness, deformity or signs of injury. Normal range of motion.      Cervical back: Normal range of motion and neck supple. No rigidity or tenderness.   Lymphadenopathy:      Cervical: No cervical adenopathy.   Skin:     General: Skin is warm and dry.      Capillary Refill: Capillary refill takes less than 2 seconds.      Coloration: Skin is not  jaundiced or pale.      Findings: No bruising, erythema, lesion or rash.   Neurological:      General: No focal deficit present.      Mental Status: He is alert and oriented to person, place, and time.      Cranial Nerves: No cranial nerve deficit.      Sensory: No sensory deficit.      Motor: Weakness present.      Coordination: Coordination normal.      Comments: Patient with 5/5 strength throughout with mild weakness of left lower extremity upon left leg raise measuring 4/5 strength but 5/5 strength throughout remaining muscle groups.  Sensation to light touch grossly intact throughout.   Psychiatric:         Mood and Affect: Mood normal.         Behavior: Behavior normal.         Thought Content: Thought content normal.         Judgment: Judgment normal.            CRANIAL NERVES     CN III, IV, VI   Pupils are equal, round, and reactive to light.     Significant Labs: All pertinent labs within the past 24 hours have been reviewed.    Significant Imaging: I have reviewed all pertinent imaging results/findings within the past 24 hours.    LABS:  Recent Results (from the past 24 hour(s))   CBC auto differential    Collection Time: 05/10/23  5:32 PM   Result Value Ref Range    WBC 10.29 3.90 - 12.70 K/uL    RBC 2.52 (L) 4.60 - 6.20 M/uL    Hemoglobin 7.8 (L) 14.0 - 18.0 g/dL    Hematocrit 25.3 (L) 40.0 - 54.0 %     (H) 82 - 98 fL    MCH 31.0 27.0 - 31.0 pg    MCHC 30.8 (L) 32.0 - 36.0 g/dL    RDW 14.7 (H) 11.5 - 14.5 %    Platelets 183 150 - 450 K/uL    MPV 10.2 9.2 - 12.9 fL    Immature Granulocytes 0.6 (H) 0.0 - 0.5 %    Gran # (ANC) 8.1 (H) 1.8 - 7.7 K/uL    Immature Grans (Abs) 0.06 (H) 0.00 - 0.04 K/uL    Lymph # 0.6 (L) 1.0 - 4.8 K/uL    Mono # 1.5 (H) 0.3 - 1.0 K/uL    Eos # 0.0 0.0 - 0.5 K/uL    Baso # 0.02 0.00 - 0.20 K/uL    nRBC 0 0 /100 WBC    Gran % 78.4 (H) 38.0 - 73.0 %    Lymph % 5.9 (L) 18.0 - 48.0 %    Mono % 14.8 4.0 - 15.0 %    Eosinophil % 0.1 0.0 - 8.0 %    Basophil % 0.2 0.0 - 1.9 %     Differential Method Automated    Comprehensive metabolic panel    Collection Time: 05/10/23  5:32 PM   Result Value Ref Range    Sodium 137 136 - 145 mmol/L    Potassium 3.8 3.5 - 5.1 mmol/L    Chloride 102 95 - 110 mmol/L    CO2 17 (L) 23 - 29 mmol/L    Glucose 134 (H) 70 - 110 mg/dL    BUN 91 (H) 8 - 23 mg/dL    Creatinine 3.5 (H) 0.5 - 1.4 mg/dL    Calcium 8.5 (L) 8.7 - 10.5 mg/dL    Total Protein 7.2 6.0 - 8.4 g/dL    Albumin 3.2 (L) 3.5 - 5.2 g/dL    Total Bilirubin 0.3 0.1 - 1.0 mg/dL    Alkaline Phosphatase 69 55 - 135 U/L    AST 34 10 - 40 U/L    ALT 20 10 - 44 U/L    Anion Gap 18 (H) 8 - 16 mmol/L    eGFR 17 (A) >60 mL/min/1.73 m^2   Troponin I    Collection Time: 05/10/23  5:32 PM   Result Value Ref Range    Troponin I 0.250 (H) 0.000 - 0.026 ng/mL   Brain natriuretic peptide    Collection Time: 05/10/23  5:32 PM   Result Value Ref Range     (H) 0 - 99 pg/mL   Lactic acid, plasma    Collection Time: 05/10/23  5:32 PM   Result Value Ref Range    Lactate (Lactic Acid) 1.6 0.5 - 2.2 mmol/L   Urinalysis, Reflex to Urine Culture Urine, Clean Catch    Collection Time: 05/10/23  6:19 PM    Specimen: Urine   Result Value Ref Range    Specimen UA Urine, Clean Catch     Color, UA Yellow Yellow, Straw, Beth    Appearance, UA Clear Clear    pH, UA 5.0 5.0 - 8.0    Specific Gravity, UA 1.020 1.005 - 1.030    Protein, UA Trace (A) Negative    Glucose, UA Negative Negative    Ketones, UA Negative Negative    Bilirubin (UA) Negative Negative    Occult Blood UA Negative Negative    Nitrite, UA Negative Negative    Urobilinogen, UA Negative <2.0 EU/dL    Leukocytes, UA Negative Negative   Troponin I    Collection Time: 05/10/23  8:39 PM   Result Value Ref Range    Troponin I 0.239 (H) 0.000 - 0.026 ng/mL       RADIOLOGY  X-Ray Thoracic Spine AP Lateral    Result Date: 5/1/2023  EXAMINATION: XR THORACIC SPINE AP LATERAL CLINICAL HISTORY: Other mechanical complication of implanted electronic neurostimulator of spinal  cord electrode (lead), initial encounter TECHNIQUE: AP and lateral views were obtained of the thoracic spine. COMPARISON: None. FINDINGS: Thoracic vertebral body heights and alignment are within normal limits.  Mild multilevel degenerative endplate spurring noted with preserved thoracic intervertebral disc heights.  Multilevel degenerative disc disease is present in the cervical spine with suspected straightening of the normal cervical lordosis.  Prior sternotomy and cardiac pacing device noted.  Spinal stimulator leads terminate in the lower thoracic spine.No acute fractures or subluxations demonstrated.     Degenerative findings as above. Electronically signed by: Victor Hugo Baiely MD Date:    05/01/2023 Time:    12:07    X-Ray Lumbar Complete Including Flex And Ext    Result Date: 5/1/2023  EXAMINATION: XR LUMBAR SPINE 5 VIEW WITH FLEX AND EXT CLINICAL HISTORY: Other mechanical complication of implanted electronic neurostimulator of spinal cord electrode (lead), initial encounter TECHNIQUE: Five views of the lumbar spine plus flexion extension views were performed. COMPARISON: 03/23/2023 FINDINGS: There is mild dextroscoliosis of the thoracic spine.  There is slight retrolisthesis of L2 on L3 and L3 on L4.  Vertebral body heights are within normal limits.  No change in spinal alignment with flexion or extension to suggest instability.  There is severe disc height loss at L2-3.  Moderate to severe disc height loss at T12-L1, L3-4, and L4-5.  No pars defects visualized.  Multilevel facet arthropathy noted.  No fractures visualized.  A stimulator device is in place with leads extending into the lower thoracic spine.  Multiple rounded calcific densities are seen in the right upper abdominal quadrant, most likely representing gallstones.     Degenerative findings and spondylolisthesis as above Electronically signed by: Victor Hugo Bailey MD Date:    05/01/2023 Time:    12:05    X-Ray Knee 3 View Left    Result Date:  5/10/2023  EXAMINATION: XR KNEE 3 VIEW LEFT CLINICAL HISTORY: Pain in left knee TECHNIQUE: AP, lateral, and Merchant views of the left knee were performed. COMPARISON: None FINDINGS: No acute fracture or dislocation.  Postsurgical changes around the medial aspect of the knee atherosclerotic changes.  No acute fracture or dislocation.  Minimal chondrocalcinosis.  Suspect minimal degenerative joint disease.     No acute fracture or dislocation. Electronically signed by: Jack Suresh Date:    05/10/2023 Time:    18:05    CT Head Without Contrast    Result Date: 5/10/2023  EXAMINATION: CT HEAD WITHOUT CONTRAST CLINICAL HISTORY: Head trauma, minor (Age >= 65y); TECHNIQUE: Low dose axial CT images obtained throughout the head without intravenous contrast. Sagittal and coronal reconstructions were performed. COMPARISON: None. FINDINGS: Atrophy and chronic white matter changes Ventricles and sulci are normal in size for age without evidence of hydrocephalus. No extra-axial blood or fluid collections. No parenchymal mass, hemorrhage, edema or major vascular distribution infarct. Skull/extracranial contents (limited evaluation): No fracture. Mastoid air cells and paranasal sinuses are essentially clear.     No acute abnormality. Atrophy and chronic white matter changes All CT scans   are performed using dose optimization techniques including the following: automated exposure control; adjustment of the mA and/or kV; use of iterative reconstruction technique.  Dose modulation was employed for ALARA by means of: Automated exposure control; adjustment of the mA and/or kV according to patient size (this includes techniques or standardized protocols for targeted exams where dose is matched to indication/reason for exam; i.e. extremities or head); and/or use of iterative reconstructive technique. Electronically signed by: Jack Suresh Date:    05/10/2023 Time:    18:25    CT Lumbar Spine Without Contrast    Result Date:  5/10/2023  EXAMINATION: CT LUMBAR SPINE WITHOUT CONTRAST CLINICAL HISTORY: Low back pain, trauma; TECHNIQUE: CT lumbar spine without COMPARISON: None FINDINGS: No vertebral body compression deformity.  Spinal stimulator device noted.  Moderate severe multilevel degenerative disc disease.  Fusion of L3 and L2 with suggestion of prior posterior fixation.  Laminectomy at L4-L5.  Atherosclerotic changes of the aorta iliac vasculature.  No acute fracture or dislocation.  Decreased bone mineral density.  Mild posterior subcutaneous fat stranding along the postoperative bed.     No acute fracture or dislocation Extensive degenerative joint disease Atherosclerotic disease Spinal stimulator device.  Correlate clinically Prior postsurgical changes including laminectomy at L4-L5 and prior posterior fixation at L2-L3.  Correlate to surgical history. All CT scans at this facility are performed  using dose modulation techniques as appropriate to performed exam including the following:  automated exposure control; adjustment of mA and/or kV according to the patients size (this includes techniques or standardized protocols for targeted exams where dose is matched to indication/reason for exam: i.e. extremities or head);  iterative reconstruction technique. Electronically signed by: Jack Suresh Date:    05/10/2023 Time:    22:47    X-Ray Chest AP Portable    Result Date: 5/10/2023  EXAMINATION: XR CHEST AP PORTABLE CLINICAL HISTORY: Hypotension, unspecified TECHNIQUE: Single frontal view of the chest was performed. COMPARISON: None FINDINGS: Left chest biventricular pacing.  Improved perihilar bronchovascular crowding compared to prior exam. Cardiomegaly the hilar and mediastinal contours are unremarkable. Bones are intact..  Senescent changes.  Mild basilar atelectasis.     As above Electronically signed by: Jack Suresh Date:    05/10/2023 Time:    18:02    CT Hip Without Contrast Left    Result Date: 5/10/2023  EXAMINATION: CT  HIP WITHOUT CONTRAST LEFT CLINICAL HISTORY: Hip trauma, fracture suspected, no prior imaging; TECHNIQUE: CT of the left hip with sagittal and coronal reformations without contrast. COMPARISON: Prior radiograph FINDINGS: No acute fracture or dislocation.  Degenerative joint disease of the hip and to a less extent femoroacetabular joint.  Superior pubic rami inferior pubic rami are intact.  Pubic symphysis demonstrates degenerative joint disease.  Fat containing anterior muscular bundle.     No evidence for hip fracture or dislocation.  Senescent changes All CT scans   are performed using dose optimization techniques including the following: automated exposure control; adjustment of the mA and/or kV; use of iterative reconstruction technique.  Dose modulation was employed for ALARA by means of: Automated exposure control; adjustment of the mA and/or kV according to patient size (this includes techniques or standardized protocols for targeted exams where dose is matched to indication/reason for exam; i.e. extremities or head); and/or use of iterative reconstructive technique. Electronically signed by: Jack Suresh Date:    05/10/2023 Time:    22:44      EKG    MICROBIOLOGY    [facility]      Assessment/Plan:     Weakness of both lower extremities  Patient presented with worsening bilateral lower extremity weakness.  CT lumbar spine and hips/pelvis showed degenerative disease.  Patient without evidence of bowel/bladder incontinence, lumbar back pain, hip pain, and has 5/5 strength throughout with the exception of 4/5 strength upon left leg raise.  Sensation to light touch grossly intact throughout.  Patient remains afebrile without leukocytosis.  UA negative for UTI.  Patient does have evidence of slight elevated anion gap metabolic acidosis with AG measuring 18 and bicarb 17. Ortho/spine consulted who recommended metabolic workup, PT/OT, and conservative therapies and patient will be evaluated in the  morning.  Plan:  -Continue current pain regimen, titrate as needed  -Bowel regimen  -Bedrest  -IVFs prn  -Antiemetics prn  -Tylenol as needed for fever   -f/u ortho/spine  -f/u cultures  -PT/OT         Acute renal failure superimposed on stage 3 chronic kidney disease  Patient found to have VAIBHAV superimposed on underlying CKD stage 3 with creatinine/GFR currently measuring 3.5/17 with last reported baseline measuring 1.9/40.  Patient is noted to be on multiple antihypertensive medications known to be nephrotoxic.  Plan:  -Avoid nephrotoxins (holding losartan and spironolactone)  -Monitor renal function  -Renally dose medications  -IVFs prn      Chronic combined systolic and diastolic congestive heart failure  Patient is identified as having Combined Systolic and Diastolic heart failure that is Chronic. CHF is currently controlled. Latest ECHO performed and demonstrates- Results for orders placed during the hospital encounter of 04/28/22    Echo    Interpretation Summary  · The left ventricle is normal in size with concentric hypertrophy and moderately decreased systolic function.  · The estimated ejection fraction is 35%.  · Grade III left ventricular diastolic dysfunction.  · Mild right ventricular enlargement with moderately reduced right ventricular systolic function.  · Mild left atrial enlargement.  · Mild right atrial enlargement.  · There is mild aortic valve stenosis.  · Aortic valve area is 1.53 cm2; peak velocity is 1.78 m/s; mean gradient is 7 mmHg.  · Mild mitral regurgitation.  · Mild tricuspid regurgitation.  · Elevated central venous pressure (15 mmHg).  · The estimated PA systolic pressure is 37 mmHg.  · There is abnormal septal wall motion. There is systolic and diastolic flattening of the interventricular septum consistent with right ventricle pressure and volume overload.  · There is moderate left ventricular global hypokinesis.  . Continue Beta Blocker, ACE/ARB, Furosemide and Aldactone and  monitor clinical status closely. Monitor on telemetry. Patient is off CHF pathway.  Monitor strict Is&Os and daily weights.  Place on fluid restriction of 1.5 L. Continue to stress to patient importance of self efficacy and  on diet for CHF. Last BNP reviewed- and noted below   Recent Labs   Lab 05/10/23  1732   *       Atrial fibrillation with chronic anticoagulation with Eliquis  Patient with Paroxysmal (<7 days) atrial fibrillation which is controlled currently with Beta Blocker. Patient is currently in sinus rhythm.[id number] Score: 4. Anticoagulation indicated. Anticoagulation done with Eliquis.      Hypertension  Currently normotensive. BP currently ranging from  systolic.  Plan:  -Optimize pain control   -Continue home medications, titrate as needed   -Monitor BP  -Low salt/cardiac diet when not NPO  -IV hydralazine prn for SBP>160 or DBP>90         S/P CABG x 3    MI, old    CAD (coronary artery disease)  Patient currently follows Dr. Hood and is currently without chest pain or shortness of breath. Troponin was noted to be elevated at 0.250 with repeat 0.239 in absence of chest pain.  EKG showed paced rhythm.  Elevation likely secondary to VAIBHAV but will continue to monitor to rule out ACS.  Current hemoglobin measuring 7.8 and is below threshold for transfusion in known cardiac patient.  Will type/strain and prepare 1 unit but hold transfusion following repeat H/H this morning.  Plan:  -telemetry  -trend troponin  -f/u labs  -type/screen, transfuse if repeat hemoglobin less than 8  -serial EKGs p.r.n. chest pain  -continue home medications, titrate as needed  -RAMIREZ therapy p.r.n.        Mixed restrictive and obstructive lung disease  Not presently in acute exacerbation and is without signs/symptoms of distress. Patient currently saturating  100% on room air.  Plan:  -Continue home medications, titrate as needed  -Titrate oxygen requirements as needed  -Incentive spirometry   -Monitor  pulse oximetry  -Duonebs prn       Hyperlipemia  Patient is chronically on statin.will continue for now. Last Lipid Panel:   Lab Results   Component Value Date    CHOL 124 07/14/2022    HDL 43 07/14/2022    LDLCALC 54.4 (L) 07/14/2022    TRIG 133 07/14/2022    CHOLHDL 34.7 07/14/2022   Plan:  -Continue home medication  -low fat/low calorie diet      Gastroesophageal reflux disease  Chronic. Stable. Currently asymptomatic. Home medications include PPI/Antacids as needed.  Plan:  -Continue PPI/Antacids as needed       Obstructive sleep apnea  Currently on CPAP palpation.  Plan:  -continue home CPAP      Class 1 obesity due to excess calories with serious comorbidity and body mass index (BMI) of 32.0 to 32.9 in adult  Body mass index is 32.08 kg/m². Morbid obesity complicates all aspects of disease management from diagnostic modalities to treatment. Weight loss encouraged and health benefits explained to patient.         VTE Risk Mitigation (From admission, onward)         Ordered     apixaban tablet 2.5 mg  2 times daily         05/10/23 2336     Reason for No Pharmacological VTE Prophylaxis  Once        Question:  Reasons:  Answer:  Already adequately anticoagulated on oral Anticoagulants    05/10/23 2324     IP VTE HIGH RISK PATIENT  Once         05/10/23 2324     Place sequential compression device  Until discontinued         05/10/23 2324              //Core Measures   -DVT proph: SCDs, apixaban   -Code status: Full    -Surrogate: spouse      Components of this note were documented using a voice recognition system and are subject to errors not corrected at the time the document was proof read. Please contact the author for any clarifications.        Carlos Britton MD  Department of Hospital Medicine  [facility]

## 2023-05-11 NOTE — HOSPITAL COURSE
5/11/23  NAEON. Presented yesterday for weakness Wife at bedside, provides history.  Patient reportedly fell onto his left knee, c/o left knee pain, +ROM  Has been having trouble to stand and ambulate, fell several times per wife    Wife reports abdominal distended, BLE edema; recently has Lasix dose increased but not improvement  Patient with CHF, noted elevated BNP. Check TTE, Lasix IV, strict I/O's  H/H also trending down, known chronic anemia  Transfuse 1 unit PRBC today for symptmoatic anemia  Imodium given for diarrhea this AM, no further episodes since    5/12/23  NAEON. Patient reports fatigue, left knee pain, swelling; CT ordered  Cr 3.5 --> 2.9, received course of IV fluids overnight  H/H 8/25.4 after 1 unit PRBC yesterday  Fecal occult+, patient does reports dark, tarry stools    5/13/23  NAEON. Reports weakness, fatigue mild improvement c/o left knee pain  CT with small to moderate left knee effusion, consulted Orthopedics  Cr 2.7, Hg 7.8, discussed with Nephrology, 1 unit PRBC transfusion today    5/14/23  NAEON. Patient attempting sitting at edge of bed, attempting to urinate  Patient reports weakness, KING - overall improving  CXR today with mild pulmonary edema, small right pleural effusion    Discussed with Nephrology, plans to restarted Lasix today  K 5.4, will stop PO potassium supplementation, resume when appropriate  Upated patient's wife at bedside    5/15: awaiting SNF placement. Cont lasix 20mg daily.   5/16: accepted to snf. Patient was dced

## 2023-05-11 NOTE — ASSESSMENT & PLAN NOTE
Patient is chronically on statin.will continue for now. Last Lipid Panel:   Lab Results   Component Value Date    CHOL 124 07/14/2022    HDL 43 07/14/2022    LDLCALC 54.4 (L) 07/14/2022    TRIG 133 07/14/2022    CHOLHDL 34.7 07/14/2022   Plan:  -Continue home medication  -low fat/low calorie diet

## 2023-05-11 NOTE — ASSESSMENT & PLAN NOTE
Patient with Paroxysmal (<7 days) atrial fibrillation which is controlled currently with Beta Blocker. Patient is currently in sinus rhythm.[id number] Score: 4. Anticoagulation indicated. Anticoagulation done with Eliquis.

## 2023-05-11 NOTE — SUBJECTIVE & OBJECTIVE
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    #4 PROSTATE BIOPSY, LEFT APEX: ADENOCARCINOMA, FARHAD GRADE 3 + 3 = 6, IN 1 of 2 CORES 9/8/2014    Anemia     Arthritis     Atrial fibrillation 10/19/2017    Back pain     Congestive heart failure 03/05/2018    Coronary artery disease     DM (diabetes mellitus) 2018     am 06/23/2020    DM (diabetes mellitus) 2016     am 08/17/2021    Hyperlipemia     Hypertension     Myocardial infarction     NASREEN (obstructive sleep apnea) 06/05/2018    Prostate cancer 2015    Tobacco dependence     Type 2 diabetes mellitus        Past Surgical History:   Procedure Laterality Date    COLONOSCOPY N/A 9/22/2020    Procedure: COLONOSCOPY;  Surgeon: Javier Farmer MD;  Location: [facility] ENDO;  Service: Endoscopy;  Laterality: N/A;    CORONARY ARTERY BYPASS GRAFT  1987    EPIDURAL STEROID INJECTION N/A 11/22/2019    Procedure: Lumbar L5/S1 IL XUAN;  Surgeon: Tacho Trivedi MD;  Location: [facility];  Service: Pain Management;  Laterality: N/A;    EPIDURAL STEROID INJECTION N/A 1/6/2020    Procedure: Lumbar L5/S1 IL XUAN;  Surgeon: Tacho Trivedi MD;  Location: [facility];  Service: Pain Management;  Laterality: N/A;    EPIDURAL STEROID INJECTION N/A 2/10/2020    Procedure: Caudal XUAN;  Surgeon: Tacho Trivedi MD;  Location: [facility];  Service: Pain Management;  Laterality: N/A;    ESOPHAGOGASTRODUODENOSCOPY N/A 9/22/2020    Procedure: EGD (ESOPHAGOGASTRODUODENOSCOPY);  Surgeon: Javier Farmer MD;  Location: [facility] ENDO;  Service: Endoscopy;  Laterality: N/A;    INJECTION OF ANESTHETIC AGENT AROUND MEDIAL BRANCH NERVES INNERVATING LUMBAR FACET JOINT Bilateral 10/12/2020    Procedure: Bilateral L3-5 MBB;  Surgeon: Tacho Trivedi MD;  Location: [facility];  Service: Pain Management;  Laterality: Bilateral;    INJECTION OF ANESTHETIC AGENT AROUND MEDIAL BRANCH NERVES INNERVATING LUMBAR  FACET JOINT Bilateral 12/21/2020    Procedure: Bilateral L3-5 MBB with steroid;  Surgeon: Tacho Trivedi MD;  Location: [facility]IN [facility]GT;  Service: Pain Management;  Laterality: Bilateral;    INSERTION OF SPINAL NEUROSTIMULATOR N/A 3/23/2023    Procedure: INSERTION, NEUROSTIMULATOR, SPINAL;  Surgeon: Philipp Demarco MD;  Location: [facility] OR;  Service: Pain Management;  Laterality: N/A;    INTRALUMINAL GASTROINTESTINAL TRACT IMAGING VIA CAPSULE N/A 12/29/2021    Procedure: IMAGING PROCEDURE, GI TRACT, INTRALUMINAL, VIA CAPSULE;  Surgeon: Tahir Geronimo RN;  Location: [facility] ENDO;  Service: Endoscopy;  Laterality: N/A;    PROSTATE SURGERY      prostate radiation    RADIOFREQUENCY THERMOCOAGULATION Left 6/4/2021    Procedure: Left L3-5 Lumbar RFA;  Surgeon: Tacho Trivedi MD;  Location: [facility] PAIN [facility]GT;  Service: Pain Management;  Laterality: Left;    RADIOFREQUENCY THERMOCOAGULATION Right 6/18/2021    Procedure: Right L3-5 Lumbar RFA;  Surgeon: Tacho Trivedi MD;  Location: [facility] PAIN MGT;  Service: Pain Management;  Laterality: Right;    REPLACEMENT OF PACEMAKER GENERATOR Left 6/16/2022    Procedure: REPLACEMENT, PULSE GENERATOR, CARDIAC PACEMAKER/CRT-P device;  Surgeon: Darrel Carrero MD;  Location: [facility] CATH LAB;  Service: Cardiology;  Laterality: Left;  NOT MRI safe   Pacer and leads implanted 9/30/2017, Dr. Souleymane SANTACRUZ Consulta CRT-P C4TR01, [id number]   A lead: Jovan 5076 CapSureFix® Novus, [id number]   RV lead: Jovan 5076 CapSureFix® Novus, [id number]   LV lead: Jovan [id number] At    SELECTIVE INJECTION OF ANESTHETIC AGENT AROUND LUMBAR SPINAL NERVE ROOT BY TRANSFORAMINAL APPROACH Bilateral 6/8/2022    Procedure: Bilateral L3/4 TF XUAN with RN IV sedation;  Surgeon: Philipp Demarco MD;  Location: [facility] PAIN MGT;  Service: Pain Management;  Laterality: Bilateral;    SPINE SURGERY      fusion    TONSILLECTOMY      TRIAL OF SPINAL CORD NERVE STIMULATOR N/A 1/25/2023    Procedure: Trial, Neurostimulator, Spinal Cord with RN  IV sedation;  Surgeon: Philipp Demarco MD;  [facility];  Service: Pain Management;  Laterality: N/A;       Review of patient's allergies indicates:  No Known Allergies    Current Facility-Administered Medications on File Prior to Encounter   Medication    ondansetron injection 4 mg     Current Outpatient Medications on File Prior to Encounter   Medication Sig    acetaminophen (TYLENOL) 500 MG tablet Take 2 tablets (1,000 mg total) by mouth every 6 (six) hours as needed for Pain.    apixaban (ELIQUIS) 2.5 mg Tab Take 1 tablet (2.5 mg total) by mouth 2 (two) times daily.    atorvastatin (LIPITOR) 80 MG tablet Take 1 tablet (80 mg total) by mouth every evening.    cefadroxil (DURICEF) 500 MG Cap Take 1 capsule (500 mg total) by mouth every 12 (twelve) hours.    cholecalciferol, vitamin D3, (VITAMIN D3) 25 mcg (1,000 unit) capsule Take 1,000 Units by mouth once daily.    furosemide (LASIX) 40 MG tablet Take 1 tablet (40 mg total) by mouth 2 (two) times daily. Can double to 2 tablets twice a day for 3 days for more swelling/fluid build up - take 80mg twice a day    krill/om-3/dha/epa/phospho/ast (MEGARED OMEGA-3 KRILL OIL ORAL) Take 1 capsule by mouth every morning.    levocetirizine (XYZAL) 5 MG tablet Take 1 tablet (5 mg total) by mouth every evening.    losartan (COZAAR) 50 MG tablet Take 1 tablet (50 mg total) by mouth once daily.    multivitamin capsule Take 1 capsule by mouth once daily. Takes once a week    pantoprazole (PROTONIX) 40 MG tablet Take 1 tablet (40 mg total) by mouth once daily.    potassium chloride SA (K-DUR,KLOR-CON) 20 MEQ tablet Take 1 tablet (20 mEq total) by mouth once daily.    pregabalin (LYRICA) 75 MG capsule Take 1 capsule QHS x 1 week, then increase to BID (if tolerated).    pyridoxine, vitamin B6, (B-6) 100 MG Tab Take 50 mg by mouth once daily.    spironolactone (ALDACTONE) 25 MG tablet Take 1 tablet (25 mg total) by mouth once daily.    vit A/vit C/vit E/zinc/copper (OCUVITE  PRESERVISION ORAL) Take 1 capsule by mouth every evening.    blood sugar diagnostic Strp Check blood glucose levels daily in the AM fasting and 1-2 times more daily.    clonazePAM (KLONOPIN) 1 MG tablet Take 1 tablet (1 mg total) by mouth nightly as needed for Anxiety.    fluticasone propionate (FLONASE) 50 mcg/actuation nasal spray 2 sprays (100 mcg total) by Each Nostril route once daily.    lancets Misc Check blood glucose levels daily in the AM fasting and 1-2 times more daily.     Family History       Problem Relation (Age of Onset)    Cataracts Mother    Diabetes Mother    Heart attack Mother    Heart disease Mother, Father, Brother    Stroke Father          Tobacco Use    Smoking status: Former     Packs/day: 1.50     Years: 20.00     Pack years: 30.00     Types: Cigarettes     Start date:      Quit date:      Years since quittin.3    Smokeless tobacco: Never   Substance and Sexual Activity    Alcohol use: No    Drug use: No    Sexual activity: Not Currently     Review of Systems   All other systems reviewed and are negative.  Objective:     Vital Signs (Most Recent):  Temp: 98.9 °F (37.2 °C) (23 005)  Pulse: 63 (23 0050)  Resp: 18 (23)  BP: 132/62 (23)  SpO2: 95 % (23) Vital Signs (24h Range):  Temp:  [98.3 °F (36.8 °C)-98.9 °F (37.2 °C)] 98.9 °F (37.2 °C)  Pulse:  [60-92] 63  Resp:  [16-27] 18  SpO2:  [95 %-100 %] 95 %  BP: ()/(44-65) 132/62     Weight: 107.8 kg (237 lb 11.2 oz)  Body mass index is 36.14 kg/m².     Physical Exam  Vitals reviewed.   Constitutional:       General: He is not in acute distress.     Appearance: Normal appearance. He is obese. He is not ill-appearing, toxic-appearing or diaphoretic.   HENT:      Head: Normocephalic and atraumatic.      Right Ear: External ear normal.      Left Ear: External ear normal.      Nose: Nose normal. No congestion or rhinorrhea.      Mouth/Throat:      Mouth: Mucous membranes are moist.       Pharynx: Oropharynx is clear. No oropharyngeal exudate or posterior oropharyngeal erythema.   Eyes:      General: No scleral icterus.     Extraocular Movements: Extraocular movements intact.      Conjunctiva/sclera: Conjunctivae normal.      Pupils: Pupils are equal, round, and reactive to light.   Neck:      Vascular: No carotid bruit.   Cardiovascular:      Rate and Rhythm: Normal rate and regular rhythm.      Pulses: Normal pulses.      Heart sounds: Murmur heard.     No friction rub. No gallop.   Pulmonary:      Effort: Pulmonary effort is normal. No respiratory distress.      Breath sounds: Normal breath sounds. No stridor. No wheezing, rhonchi or rales.   Chest:      Chest wall: No tenderness.   Abdominal:      General: Abdomen is flat. Bowel sounds are normal. There is no distension.      Palpations: Abdomen is soft. There is no mass.      Tenderness: There is no abdominal tenderness. There is no guarding or rebound.      Hernia: No hernia is present.   Musculoskeletal:         General: No swelling, tenderness, deformity or signs of injury. Normal range of motion.      Cervical back: Normal range of motion and neck supple. No rigidity or tenderness.   Lymphadenopathy:      Cervical: No cervical adenopathy.   Skin:     General: Skin is warm and dry.      Capillary Refill: Capillary refill takes less than 2 seconds.      Coloration: Skin is not jaundiced or pale.      Findings: No bruising, erythema, lesion or rash.   Neurological:      General: No focal deficit present.      Mental Status: He is alert and oriented to person, place, and time.      Cranial Nerves: No cranial nerve deficit.      Sensory: No sensory deficit.      Motor: Weakness present.      Coordination: Coordination normal.      Comments: Patient with 5/5 strength throughout with mild weakness of left lower extremity upon left leg raise measuring 4/5 strength but 5/5 strength throughout remaining muscle groups.  Sensation to light touch  grossly intact throughout.   Psychiatric:         Mood and Affect: Mood normal.         Behavior: Behavior normal.         Thought Content: Thought content normal.         Judgment: Judgment normal.            CRANIAL NERVES     CN III, IV, VI   Pupils are equal, round, and reactive to light.     Significant Labs: All pertinent labs within the past 24 hours have been reviewed.    Significant Imaging: I have reviewed all pertinent imaging results/findings within the past 24 hours.    LABS:  Recent Results (from the past 24 hour(s))   CBC auto differential    Collection Time: 05/10/23  5:32 PM   Result Value Ref Range    WBC 10.29 3.90 - 12.70 K/uL    RBC 2.52 (L) 4.60 - 6.20 M/uL    Hemoglobin 7.8 (L) 14.0 - 18.0 g/dL    Hematocrit 25.3 (L) 40.0 - 54.0 %     (H) 82 - 98 fL    MCH 31.0 27.0 - 31.0 pg    MCHC 30.8 (L) 32.0 - 36.0 g/dL    RDW 14.7 (H) 11.5 - 14.5 %    Platelets 183 150 - 450 K/uL    MPV 10.2 9.2 - 12.9 fL    Immature Granulocytes 0.6 (H) 0.0 - 0.5 %    Gran # (ANC) 8.1 (H) 1.8 - 7.7 K/uL    Immature Grans (Abs) 0.06 (H) 0.00 - 0.04 K/uL    Lymph # 0.6 (L) 1.0 - 4.8 K/uL    Mono # 1.5 (H) 0.3 - 1.0 K/uL    Eos # 0.0 0.0 - 0.5 K/uL    Baso # 0.02 0.00 - 0.20 K/uL    nRBC 0 0 /100 WBC    Gran % 78.4 (H) 38.0 - 73.0 %    Lymph % 5.9 (L) 18.0 - 48.0 %    Mono % 14.8 4.0 - 15.0 %    Eosinophil % 0.1 0.0 - 8.0 %    Basophil % 0.2 0.0 - 1.9 %    Differential Method Automated    Comprehensive metabolic panel    Collection Time: 05/10/23  5:32 PM   Result Value Ref Range    Sodium 137 136 - 145 mmol/L    Potassium 3.8 3.5 - 5.1 mmol/L    Chloride 102 95 - 110 mmol/L    CO2 17 (L) 23 - 29 mmol/L    Glucose 134 (H) 70 - 110 mg/dL    BUN 91 (H) 8 - 23 mg/dL    Creatinine 3.5 (H) 0.5 - 1.4 mg/dL    Calcium 8.5 (L) 8.7 - 10.5 mg/dL    Total Protein 7.2 6.0 - 8.4 g/dL    Albumin 3.2 (L) 3.5 - 5.2 g/dL    Total Bilirubin 0.3 0.1 - 1.0 mg/dL    Alkaline Phosphatase 69 55 - 135 U/L    AST 34 10 - 40 U/L    ALT 20  10 - 44 U/L    Anion Gap 18 (H) 8 - 16 mmol/L    eGFR 17 (A) >60 mL/min/1.73 m^2   Troponin I    Collection Time: 05/10/23  5:32 PM   Result Value Ref Range    Troponin I 0.250 (H) 0.000 - 0.026 ng/mL   Brain natriuretic peptide    Collection Time: 05/10/23  5:32 PM   Result Value Ref Range     (H) 0 - 99 pg/mL   Lactic acid, plasma    Collection Time: 05/10/23  5:32 PM   Result Value Ref Range    Lactate (Lactic Acid) 1.6 0.5 - 2.2 mmol/L   Urinalysis, Reflex to Urine Culture Urine, Clean Catch    Collection Time: 05/10/23  6:19 PM    Specimen: Urine   Result Value Ref Range    Specimen UA Urine, Clean Catch     Color, UA Yellow Yellow, Straw, Beth    Appearance, UA Clear Clear    pH, UA 5.0 5.0 - 8.0    Specific Gravity, UA 1.020 1.005 - 1.030    Protein, UA Trace (A) Negative    Glucose, UA Negative Negative    Ketones, UA Negative Negative    Bilirubin (UA) Negative Negative    Occult Blood UA Negative Negative    Nitrite, UA Negative Negative    Urobilinogen, UA Negative <2.0 EU/dL    Leukocytes, UA Negative Negative   Troponin I    Collection Time: 05/10/23  8:39 PM   Result Value Ref Range    Troponin I 0.239 (H) 0.000 - 0.026 ng/mL       RADIOLOGY  X-Ray Thoracic Spine AP Lateral    Result Date: 5/1/2023  EXAMINATION: XR THORACIC SPINE AP LATERAL CLINICAL HISTORY: Other mechanical complication of implanted electronic neurostimulator of spinal cord electrode (lead), initial encounter TECHNIQUE: AP and lateral views were obtained of the thoracic spine. COMPARISON: None. FINDINGS: Thoracic vertebral body heights and alignment are within normal limits.  Mild multilevel degenerative endplate spurring noted with preserved thoracic intervertebral disc heights.  Multilevel degenerative disc disease is present in the cervical spine with suspected straightening of the normal cervical lordosis.  Prior sternotomy and cardiac pacing device noted.  Spinal stimulator leads terminate in the lower thoracic spine.No  acute fractures or subluxations demonstrated.     Degenerative findings as above. Electronically signed by: Victor Hugo Bailey MD Date:    05/01/2023 Time:    12:07    X-Ray Lumbar Complete Including Flex And Ext    Result Date: 5/1/2023  EXAMINATION: XR LUMBAR SPINE 5 VIEW WITH FLEX AND EXT CLINICAL HISTORY: Other mechanical complication of implanted electronic neurostimulator of spinal cord electrode (lead), initial encounter TECHNIQUE: Five views of the lumbar spine plus flexion extension views were performed. COMPARISON: 03/23/2023 FINDINGS: There is mild dextroscoliosis of the thoracic spine.  There is slight retrolisthesis of L2 on L3 and L3 on L4.  Vertebral body heights are within normal limits.  No change in spinal alignment with flexion or extension to suggest instability.  There is severe disc height loss at L2-3.  Moderate to severe disc height loss at T12-L1, L3-4, and L4-5.  No pars defects visualized.  Multilevel facet arthropathy noted.  No fractures visualized.  A stimulator device is in place with leads extending into the lower thoracic spine.  Multiple rounded calcific densities are seen in the right upper abdominal quadrant, most likely representing gallstones.     Degenerative findings and spondylolisthesis as above Electronically signed by: Victor Hugo Bailey MD Date:    05/01/2023 Time:    12:05    X-Ray Knee 3 View Left    Result Date: 5/10/2023  EXAMINATION: XR KNEE 3 VIEW LEFT CLINICAL HISTORY: Pain in left knee TECHNIQUE: AP, lateral, and Merchant views of the left knee were performed. COMPARISON: None FINDINGS: No acute fracture or dislocation.  Postsurgical changes around the medial aspect of the knee atherosclerotic changes.  No acute fracture or dislocation.  Minimal chondrocalcinosis.  Suspect minimal degenerative joint disease.     No acute fracture or dislocation. Electronically signed by: Jack Suresh Date:    05/10/2023 Time:    18:05    CT Head Without Contrast    Result Date:  5/10/2023  EXAMINATION: CT HEAD WITHOUT CONTRAST CLINICAL HISTORY: Head trauma, minor (Age >= 65y); TECHNIQUE: Low dose axial CT images obtained throughout the head without intravenous contrast. Sagittal and coronal reconstructions were performed. COMPARISON: None. FINDINGS: Atrophy and chronic white matter changes Ventricles and sulci are normal in size for age without evidence of hydrocephalus. No extra-axial blood or fluid collections. No parenchymal mass, hemorrhage, edema or major vascular distribution infarct. Skull/extracranial contents (limited evaluation): No fracture. Mastoid air cells and paranasal sinuses are essentially clear.     No acute abnormality. Atrophy and chronic white matter changes All CT scans   are performed using dose optimization techniques including the following: automated exposure control; adjustment of the mA and/or kV; use of iterative reconstruction technique.  Dose modulation was employed for ALARA by means of: Automated exposure control; adjustment of the mA and/or kV according to patient size (this includes techniques or standardized protocols for targeted exams where dose is matched to indication/reason for exam; i.e. extremities or head); and/or use of iterative reconstructive technique. Electronically signed by: Jack Suresh Date:    05/10/2023 Time:    18:25    CT Lumbar Spine Without Contrast    Result Date: 5/10/2023  EXAMINATION: CT LUMBAR SPINE WITHOUT CONTRAST CLINICAL HISTORY: Low back pain, trauma; TECHNIQUE: CT lumbar spine without COMPARISON: None FINDINGS: No vertebral body compression deformity.  Spinal stimulator device noted.  Moderate severe multilevel degenerative disc disease.  Fusion of L3 and L2 with suggestion of prior posterior fixation.  Laminectomy at L4-L5.  Atherosclerotic changes of the aorta iliac vasculature.  No acute fracture or dislocation.  Decreased bone mineral density.  Mild posterior subcutaneous fat stranding along the postoperative bed.      No acute fracture or dislocation Extensive degenerative joint disease Atherosclerotic disease Spinal stimulator device.  Correlate clinically Prior postsurgical changes including laminectomy at L4-L5 and prior posterior fixation at L2-L3.  Correlate to surgical history. All CT scans at this facility are performed  using dose modulation techniques as appropriate to performed exam including the following:  automated exposure control; adjustment of mA and/or kV according to the patients size (this includes techniques or standardized protocols for targeted exams where dose is matched to indication/reason for exam: i.e. extremities or head);  iterative reconstruction technique. Electronically signed by: Jack Suresh Date:    05/10/2023 Time:    22:47    X-Ray Chest AP Portable    Result Date: 5/10/2023  EXAMINATION: XR CHEST AP PORTABLE CLINICAL HISTORY: Hypotension, unspecified TECHNIQUE: Single frontal view of the chest was performed. COMPARISON: None FINDINGS: Left chest biventricular pacing.  Improved perihilar bronchovascular crowding compared to prior exam. Cardiomegaly the hilar and mediastinal contours are unremarkable. Bones are intact..  Senescent changes.  Mild basilar atelectasis.     As above Electronically signed by: Jack Suresh Date:    05/10/2023 Time:    18:02    CT Hip Without Contrast Left    Result Date: 5/10/2023  EXAMINATION: CT HIP WITHOUT CONTRAST LEFT CLINICAL HISTORY: Hip trauma, fracture suspected, no prior imaging; TECHNIQUE: CT of the left hip with sagittal and coronal reformations without contrast. COMPARISON: Prior radiograph FINDINGS: No acute fracture or dislocation.  Degenerative joint disease of the hip and to a less extent femoroacetabular joint.  Superior pubic rami inferior pubic rami are intact.  Pubic symphysis demonstrates degenerative joint disease.  Fat containing anterior muscular bundle.     No evidence for hip fracture or dislocation.  Senescent changes All CT scans    are performed using dose optimization techniques including the following: automated exposure control; adjustment of the mA and/or kV; use of iterative reconstruction technique.  Dose modulation was employed for ALARA by means of: Automated exposure control; adjustment of the mA and/or kV according to patient size (this includes techniques or standardized protocols for targeted exams where dose is matched to indication/reason for exam; i.e. extremities or head); and/or use of iterative reconstructive technique. Electronically signed by: Jack Suresh Date:    05/10/2023 Time:    22:44      EKG    MICROBIOLOGY    MDM

## 2023-05-11 NOTE — ASSESSMENT & PLAN NOTE
Patient is identified as having Combined Systolic and Diastolic heart failure that is Acute on chronic. CHF is currently controlled and uncontrolled due to Continued edema of extremities. Latest ECHO performed and demonstrates- Results for orders placed during the hospital encounter of 04/28/22    Echo    Interpretation Summary  · The left ventricle is normal in size with concentric hypertrophy and moderately decreased systolic function.  · The estimated ejection fraction is 35%.  · Grade III left ventricular diastolic dysfunction.  · Mild right ventricular enlargement with moderately reduced right ventricular systolic function.  · Mild left atrial enlargement.  · Mild right atrial enlargement.  · There is mild aortic valve stenosis.  · Aortic valve area is 1.53 cm2; peak velocity is 1.78 m/s; mean gradient is 7 mmHg.  · Mild mitral regurgitation.  · Mild tricuspid regurgitation.  · Elevated central venous pressure (15 mmHg).  · The estimated PA systolic pressure is 37 mmHg.  · There is abnormal septal wall motion. There is systolic and diastolic flattening of the interventricular septum consistent with right ventricle pressure and volume overload.  · There is moderate left ventricular global hypokinesis.  . Continue Beta Blocker, ACE/ARB, Furosemide and Aldactone and monitor clinical status closely. Monitor on telemetry. Patient is off CHF pathway.  Monitor strict Is&Os and daily weights.  Place on fluid restriction of 1.5 L. Continue to stress to patient importance of self efficacy and  on diet for CHF. Last BNP reviewed- and noted below   Recent Labs   Lab 05/10/23  1732   *     TTE pending  Telemetry monitoring  Lasix 40 mg IV q day  Strict I/O's, Na/fluid restriction

## 2023-05-11 NOTE — ASSESSMENT & PLAN NOTE
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  Patient is noted to be on multiple antihypertensive medications known to be nephrotoxic.  Plan:  -Avoid nephrotoxins (holding losartan and spironolactone)  -Monitor renal function  -Renally dose medications  -IVFs prn    5/11  -UA with trace protein  -Patient's wife reports Lasix dose was increased this past week; patient states he hasn't noted improvement in abdominal distention or LE edema  -Strict I/O's, renally dose mediations, AM labs

## 2023-05-11 NOTE — ASSESSMENT & PLAN NOTE
Body mass index is 32.08 kg/m². Morbid obesity complicates all aspects of disease management from diagnostic modalities to treatment. Weight loss encouraged and health benefits explained to patient.

## 2023-05-11 NOTE — PROGRESS NOTES
[facility] Medicine  Progress Note    Patient Name: Jake Ortiz  MRN: [id number]  Patient Class: IP- Inpatient   Admission Date: 5/10/2023  Length of Stay: 1 days  Attending Physician: Bobby Garcia MD  Primary Care Provider: Byron Stevens MD        Subjective:     Principal Problem:Acute renal failure superimposed on stage 3 chronic kidney disease        HPI:  Jake Ortiz is a 82 y.o. 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 who presented to the ED for worsening bilateral lower extremity weakness x2 days duration.  Patient reported upon waking earlier this morning, his legs gave out causing him to fall on his left knee has been unable to walk/stand since then.  Patient denied endorsing any head trauma, loss of consciousness, or sustaining any other injuries in his only complaining of left knee pain described as hurting/sore in nature, constant, currently rated 5/10 in severity with aggravating factors including weight-bearing, movement, and palpation to the affected area.  He denied endorsing any lower back pain, hip pain, numbness/tingling of his lower extremities, bowel/bladder incontinence, and reported all other review of systems negative except as noted above.  Of note, patient did have his nerve stimulator manipulated yesterday and reported being in his usual state of health prior to onset of symptoms.  Initial workup in the ED revealed CT lumbar spine and hip positive for degenerative disease but negative for evidence of fractures or dislocations.  Ortho/spine was consulted  by ED staff rolling further imaging via MRI with recommendations to admit to hospital medicine for continued medical management and treatment/workup of metabolic causes as well as PT/OT evaluation inpatient will be evaluated in the morning.      PCP: Byron Stevens        Overview/Hospital Course:  5/11/23  MEENA. Presented yesterday for weakness Wife at bedside, provides history.  Patient reportedly fell onto his left knee, c/o left knee pain, +ROM  Has been having trouble to stand and ambulate, fell several times per wife    Wife reports abdominal distended, BLE edema; recently has Lasix dose increased but not improvement  Patient with CHF, noted elevated BNP. Check TTE, Lasix IV, strict I/O's  H/H also trending down, known chronic anemia  Transfuse 1 unit PRBC today for symptmoatic anemia  Imodium given for diarrhea this AM, no further episodes since        Review of Systems   All other systems reviewed and are negative.  Objective:     Vital Signs (Most Recent):  Temp: 98.2 °F (36.8 °C) (05/11/23 1341)  Pulse: 66 (05/11/23 1341)  Resp: 18 (05/11/23 1341)  BP: (!) 144/64 (05/11/23 1341)  SpO2: 97 % (05/11/23 1341) Vital Signs (24h Range):  Temp:  [98.2 °F (36.8 °C)-99.4 °F (37.4 °C)] 98.2 °F (36.8 °C)  Pulse:  [60-92] 66  Resp:  [16-27] 18  SpO2:  [93 %-100 %] 97 %  BP: ()/(44-65) 144/64     Weight: 95.7 kg (210 lb 15.7 oz)  Body mass index is 32.08 kg/m².  No intake or output data in the 24 hours ending 05/11/23 1433      Physical Exam  Constitutional:       General: He is not in acute distress.     Appearance: He is obese. He is ill-appearing.   Cardiovascular:      Rate and Rhythm: Normal rate and regular rhythm.      Heart sounds: No murmur heard.  Pulmonary:      Effort: Pulmonary effort is normal. No respiratory distress.      Breath sounds: Normal breath sounds. No wheezing.   Abdominal:      General: There is distension.      Palpations: Abdomen is soft.      Tenderness: There is no abdominal  tenderness.   Musculoskeletal:         General: No tenderness, deformity or signs of injury.      Right lower leg: Edema present.      Left lower leg: Edema present.   Skin:     Coloration: Skin is pale.      Findings: No erythema, lesion or rash.   Neurological:      Mental Status: He is alert.           Significant Labs: All pertinent labs within the past 24 hours have been reviewed.  CBC:   Recent Labs   Lab 05/10/23  1732 05/11/23  0634   WBC 10.29 10.08   HGB 7.8* 7.2*   HCT 25.3* 23.3*    177     CMP:   Recent Labs   Lab 05/10/23  1732 05/11/23  0634    136   K 3.8 4.0    104   CO2 17* 18*   * 151*   BUN 91* 102*   CREATININE 3.5* 3.5*   CALCIUM 8.5* 8.5*   PROT 7.2 6.6   ALBUMIN 3.2* 2.9*   BILITOT 0.3 0.4   ALKPHOS 69 63   AST 34 28   ALT 20 19   ANIONGAP 18* 14       Significant Imaging: I have reviewed all pertinent imaging results/findings within the past 24 hours.    CT Hip Without Contrast Left   Final Result      No evidence for hip fracture or dislocation.  Senescent changes      All CT scans   are performed using dose optimization techniques including the following: automated exposure control; adjustment of the mA and/or kV; use of iterative reconstruction technique.  Dose modulation was employed for ALARA by means of: Automated exposure control; adjustment of the mA and/or kV according to patient size (this includes techniques or standardized protocols for targeted exams where dose is matched to indication/reason for exam; i.e. extremities or head); and/or use of iterative reconstructive technique.         Electronically signed by: Jack Suresh   Date:    05/10/2023   Time:    22:44      CT Lumbar Spine Without Contrast   Final Result      No acute fracture or dislocation      Extensive degenerative joint disease      Atherosclerotic disease      Spinal stimulator device.  Correlate clinically      Prior postsurgical changes including laminectomy at L4-L5 and prior  posterior fixation at L2-L3.  Correlate to surgical history.      All CT scans at this facility are performed  using dose modulation techniques as appropriate to performed exam including the following:  automated exposure control; adjustment of mA and/or kV according to the patients size (this includes techniques or standardized protocols for targeted exams where dose is matched to indication/reason for exam: i.e. extremities or head);  iterative reconstruction technique.         Electronically signed by: Jack Suresh   Date:    05/10/2023   Time:    22:47      CT Head Without Contrast   Final Result      No acute abnormality.      Atrophy and chronic white matter changes      All CT scans   are performed using dose optimization techniques including the following: automated exposure control; adjustment of the mA and/or kV; use of iterative reconstruction technique.  Dose modulation was employed for ALARA by means of: Automated exposure control; adjustment of the mA and/or kV according to patient size (this includes techniques or standardized protocols for targeted exams where dose is matched to indication/reason for exam; i.e. extremities or head); and/or use of iterative reconstructive technique.         Electronically signed by: Jack Suresh   Date:    05/10/2023   Time:    18:25      X-Ray Chest AP Portable   Final Result      As above         Electronically signed by: Jack Suresh   Date:    05/10/2023   Time:    18:02      X-Ray Knee 3 View Left   Final Result      No acute fracture or dislocation.         Electronically signed by: Jack Suresh   Date:    05/10/2023   Time:    18:05      MRI Lumbar Spine W WO Cont    (Results Pending)           Assessment/Plan:      * Acute renal failure superimposed on stage 3 chronic kidney disease  Patient found to have VAIBHAV superimposed on underlying CKD stage 3 with creatinine/GFR currently measuring 3.5/17 with last reported baseline measuring 1.9/40.  Patient is noted  to be on multiple antihypertensive medications known to be nephrotoxic.  Plan:  -Avoid nephrotoxins (holding losartan and spironolactone)  -Monitor renal function  -Renally dose medications  -IVFs prn    5/11  -UA with trace protein  -Patient's wife reports Lasix dose was increased this past week; patient states he hasn't noted improvement in abdominal distention or LE edema  -Strict I/O's, renally dose mediations, AM labs    Chronic combined systolic and diastolic congestive heart failure  Patient is identified as having Combined Systolic and Diastolic heart failure that is Acute on chronic. CHF is currently controlled and uncontrolled due to Continued edema of extremities. Latest ECHO performed and demonstrates- Results for orders placed during the hospital encounter of 04/28/22    Echo    Interpretation Summary  · The left ventricle is normal in size with concentric hypertrophy and moderately decreased systolic function.  · The estimated ejection fraction is 35%.  · Grade III left ventricular diastolic dysfunction.  · Mild right ventricular enlargement with moderately reduced right ventricular systolic function.  · Mild left atrial enlargement.  · Mild right atrial enlargement.  · There is mild aortic valve stenosis.  · Aortic valve area is 1.53 cm2; peak velocity is 1.78 m/s; mean gradient is 7 mmHg.  · Mild mitral regurgitation.  · Mild tricuspid regurgitation.  · Elevated central venous pressure (15 mmHg).  · The estimated PA systolic pressure is 37 mmHg.  · There is abnormal septal wall motion. There is systolic and diastolic flattening of the interventricular septum consistent with right ventricle pressure and volume overload.  · There is moderate left ventricular global hypokinesis.  . Continue Beta Blocker, ACE/ARB, Furosemide and Aldactone and monitor clinical status closely. Monitor on telemetry. Patient is off CHF pathway.  Monitor strict Is&Os and daily weights.  Place on fluid restriction of 1.5 L.  Continue to stress to patient importance of self efficacy and  on diet for CHF. Last BNP reviewed- and noted below   Recent Labs   Lab 05/10/23  1732   *     TTE pending  Telemetry monitoring  Lasix 40 mg IV q day  Strict I/O's, Na/fluid restriction    Symptomatic anemia  Acute on chronic macrocytic anemia  Denies melena, BRBPR  Likely contributing to his fatigue/weakness  Transfuse 1 unit PRBC    Weakness of both lower extremities  Patient presented with worsening bilateral lower extremity weakness.  CT lumbar spine and hips/pelvis showed degenerative disease.  Patient without evidence of bowel/bladder incontinence, lumbar back pain, hip pain, and has 5/5 strength throughout with the exception of 4/5 strength upon left leg raise.  Sensation to light touch grossly intact throughout.  Patient remains afebrile without leukocytosis.  UA negative for UTI.  Patient does have evidence of slight elevated anion gap metabolic acidosis with AG measuring 18 and bicarb 17. Ortho/spine consulted who recommended metabolic workup, PT/OT, and conservative therapies and patient will be evaluated in the morning.  Plan:  -Continue current pain regimen, titrate as needed  -Bowel regimen  -Bedrest  -IVFs prn  -Antiemetics prn  -Tylenol as needed for fever   -f/u ortho/spine  -f/u cultures  -PT/OT    5/11  -MRI lumbar spine currently pending      Atrial fibrillation with chronic anticoagulation with Eliquis  Patient with Paroxysmal (<7 days) atrial fibrillation which is controlled currently with Beta Blocker. Patient is currently in sinus rhythm.[id number] Score: 4. Anticoagulation indicated. Anticoagulation done with Eliquis.    CAD (coronary artery disease)  Patient currently follows Dr. Hood and is currently without chest pain or shortness of breath. Troponin was noted to be elevated at 0.250 with repeat 0.239 in absence of chest pain.  EKG showed paced rhythm.  Elevation likely secondary to VAIBHAV but will continue to  monitor to rule out ACS.  Current hemoglobin measuring 7.8 and is below threshold for transfusion in known cardiac patient.  Will type/strain and prepare 1 unit but hold transfusion following repeat H/H this morning.  Plan:  -telemetry  -trend troponin  -f/u labs  -type/screen, transfuse if repeat hemoglobin less than 8  -serial EKGs p.r.n. chest pain  -continue home medications, titrate as needed  -RAMIREZ therapy p.r.n.      Mixed restrictive and obstructive lung disease  Not presently in acute exacerbation and is without signs/symptoms of distress. Patient currently saturating  100% on room air.  Plan:  -Continue home medications, titrate as needed  -Titrate oxygen requirements as needed  -Incentive spirometry   -Monitor pulse oximetry  -Duonebs prn     Hypertension  Currently normotensive. BP currently ranging from  systolic.  Plan:  -Optimize pain control   -Continue home medications, titrate as needed   -Monitor BP  -Low salt/cardiac diet when not NPO  -IV hydralazine prn for SBP>160 or DBP>90       5/11  -Continue to hold home losartan and spironolactone due to acute on CKD  -Resume when appropriate     Hyperlipemia  Patient is chronically on statin.will continue for now. Last Lipid Panel:   Lab Results   Component Value Date    CHOL 124 07/14/2022    HDL 43 07/14/2022    LDLCALC 54.4 (L) 07/14/2022    TRIG 133 07/14/2022    CHOLHDL 34.7 07/14/2022   Plan:  -Continue home medication  -low fat/low calorie diet    Obstructive sleep apnea  Currently on CPAP palpation.  Plan:  -continue home CPAP    Gastroesophageal reflux disease  Chronic. Stable. Currently asymptomatic. Home medications include PPI/Antacids as needed.  Plan:  -Continue PPI/Antacids as needed     Class 1 obesity due to excess calories with serious comorbidity and body mass index (BMI) of 32.0 to 32.9 in adult  Body mass index is 32.08 kg/m². Morbid obesity complicates all aspects of disease management from diagnostic modalities to treatment.  Weight loss encouraged and health benefits explained to patient.         S/P CABG x 3        MI, old          VTE Risk Mitigation (From admission, onward)         Ordered     apixaban tablet 2.5 mg  2 times daily         05/10/23 2336     Reason for No Pharmacological VTE Prophylaxis  Once        Question:  Reasons:  Answer:  Already adequately anticoagulated on oral Anticoagulants    05/10/23 2324     IP VTE HIGH RISK PATIENT  Once         05/10/23 2324     Place sequential compression device  Until discontinued         05/10/23 2324                Discharge Planning   LITTLE:      Code Status: Full Code   Is the patient medically ready for discharge?:     Reason for patient still in hospital (select all that apply): Patient trending condition, Laboratory test, Treatment and Imaging  Discharge Plan A: Home with family                  Bobby Garcia MD  Department of Hospital Medicine   [place] - Med Surg

## 2023-05-11 NOTE — CONSULTS
[facility]  Nephrology  Consult Note    Patient Name: Jake Ortiz  MRN: [id number]  Admission Date: 5/10/2023  Hospital Length of Stay: 1 days  Attending Provider: Bobby Garcia MD   Primary Care Physician: Byron Stevens MD  Principal Problem:Acute renal failure superimposed on stage 3 chronic kidney disease    Inpatient consult to Nephrology  Consult performed by: Tavo Morel MD  Consult ordered by: Bobby Garcia MD  Reason for consult: VAIBHAV      Subjective:     HPI:  82-year-old male with history of CKD 3.  Admitted with weakness and difficulty walking.  Noted to have elevated creatinine on admit labs.  Nephrology has been consulted for evaluation.  The patient was seen in his hospital room.  Sitting on side of the bed resting comfortably.  His wife was at the bedside.  Is in no acute distress.  He relates that his Lasix was increased to 80 mg twice daily about a week ago.  Additionally his wife relates that he had been having diarrhea since yesterday.  He does not use nonsteroidal anti-inflammatory agents only taking Tylenol arthritis.    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    #4 PROSTATE BIOPSY, LEFT APEX: ADENOCARCINOMA, FARHAD GRADE 3 + 3 = 6, IN 1 of 2 CORES 9/8/2014    Anemia     Arthritis     Atrial fibrillation 10/19/2017    Back pain     Congestive heart failure 03/05/2018    Coronary artery disease     DM (diabetes mellitus) 2018     am 06/23/2020    DM (diabetes mellitus) 2016     am 08/17/2021    Hyperlipemia     Hypertension     Myocardial infarction     NASREEN (obstructive sleep apnea) 06/05/2018    Prostate cancer 2015    Tobacco dependence     Type 2 diabetes mellitus        Past Surgical History:   Procedure Laterality Date    COLONOSCOPY N/A 9/22/2020    Procedure: COLONOSCOPY;  Surgeon: Javier Farmer MD;  Location: [facility] ENDO;  Service: Endoscopy;  Laterality: N/A;    CORONARY ARTERY BYPASS GRAFT   1987    EPIDURAL STEROID INJECTION N/A 11/22/2019    Procedure: Lumbar L5/S1 IL XUAN;  Surgeon: Tacho Trivedi MD;  [facility];  Service: Pain Management;  Laterality: N/A;    EPIDURAL STEROID INJECTION N/A 1/6/2020    Procedure: Lumbar L5/S1 IL UXAN;  Surgeon: Tacho Trivedi MD;  [facility];  Service: Pain Management;  Laterality: N/A;    EPIDURAL STEROID INJECTION N/A 2/10/2020    Procedure: Caudal XUAN;  Surgeon: Tacho Trivedi MD;  Location: [facility] PAIN MGT;  Service: Pain Management;  Laterality: N/A;    ESOPHAGOGASTRODUODENOSCOPY N/A 9/22/2020    Procedure: EGD (ESOPHAGOGASTRODUODENOSCOPY);  Surgeon: Javier Farmer MD;  Location: [facility] ENDO;  Service: Endoscopy;  Laterality: N/A;    INJECTION OF ANESTHETIC AGENT AROUND MEDIAL BRANCH NERVES INNERVATING LUMBAR FACET JOINT Bilateral 10/12/2020    Procedure: Bilateral L3-5 MBB;  Surgeon: Tacho Trivedi MD;  Location: [facility] PAIN MGT;  Service: Pain Management;  Laterality: Bilateral;    INJECTION OF ANESTHETIC AGENT AROUND MEDIAL BRANCH NERVES INNERVATING LUMBAR FACET JOINT Bilateral 12/21/2020    Procedure: Bilateral L3-5 MBB with steroid;  Surgeon: Tacho Trivedi MD;  Location: [facility] PAIN MGT;  Service: Pain Management;  Laterality: Bilateral;    INSERTION OF SPINAL NEUROSTIMULATOR N/A 3/23/2023    Procedure: INSERTION, NEUROSTIMULATOR, SPINAL;  Surgeon: Philipp Demarco MD;  Location: [facility] OR;  Service: Pain Management;  Laterality: N/A;    INTRALUMINAL GASTROINTESTINAL TRACT IMAGING VIA CAPSULE N/A 12/29/2021    Procedure: IMAGING PROCEDURE, GI TRACT, INTRALUMINAL, VIA CAPSULE;  Surgeon: Tahir Geronimo RN;  Location: [facility] ENDO;  Service: Endoscopy;  Laterality: N/A;    PROSTATE SURGERY      prostate radiation    RADIOFREQUENCY THERMOCOAGULATION Left 6/4/2021    Procedure: Left L3-5 Lumbar RFA;  Surgeon: Tacho Trivedi MD;  Location: [facility] PAIN MGT;  Service: Pain Management;  Laterality: Left;    RADIOFREQUENCY  THERMOCOAGULATION Right 6/18/2021    Procedure: Right L3-5 Lumbar RFA;  Surgeon: Tacho Trivedi MD;  Location: [facility] PAIN MGT;  Service: Pain Management;  Laterality: Right;    REPLACEMENT OF PACEMAKER GENERATOR Left 6/16/2022    Procedure: REPLACEMENT, PULSE GENERATOR, CARDIAC PACEMAKER/CRT-P device;  Surgeon: Darrel Carrero MD;  Location: [facility] CATH LAB;  Service: Cardiology;  Laterality: Left;  NOT MRI safe   Pacer and leads implanted 9/30/2017, Dr. Souleymane CARROLLT Consulta CRT-P C4TR01, [id number]   A lead: Mdt 5076 CapSureFix® Novus, [id number]   RV lead: Mdt 5076 CapSureFix® Novus, [id number]   LV lead: Jovan 4196 At    SELECTIVE INJECTION OF ANESTHETIC AGENT AROUND LUMBAR SPINAL NERVE ROOT BY TRANSFORAMINAL APPROACH Bilateral 6/8/2022    Procedure: Bilateral L3/4 TF XUAN with RN IV sedation;  Surgeon: Philipp Demarco MD;  Location: [facility] PAIN MGT;  Service: Pain Management;  Laterality: Bilateral;    SPINE SURGERY      fusion    TONSILLECTOMY      TRIAL OF SPINAL CORD NERVE STIMULATOR N/A 1/25/2023    Procedure: Trial, Neurostimulator, Spinal Cord with RN IV sedation;  Surgeon: Philipp Demarco MD;  Location: [facility] PAIN MGT;  Service: Pain Management;  Laterality: N/A;       Review of patient's allergies indicates:  No Known Allergies  Current Facility-Administered Medications   Medication Frequency    0.9%  NaCl infusion (for blood administration) Q24H PRN    0.9%  NaCl infusion Continuous    acetaminophen suppository 650 mg Q6H PRN    acetaminophen tablet 650 mg Q8H PRN    aluminum-magnesium hydroxide-simethicone 200-200-20 mg/5 mL suspension 30 mL QID PRN    apixaban tablet 2.5 mg BID    atorvastatin tablet 80 mg QHS    clonazePAM tablet 1 mg Nightly PRN    dextrose 10% bolus 125 mL 125 mL PRN    dextrose 10% bolus 250 mL 250 mL PRN    dextrose 40 % gel 15,000 mg PRN    dextrose 40 % gel 30,000 mg PRN    glucagon (human recombinant) injection 1 mg PRN    HYDROcodone-acetaminophen 5-325 mg per tablet 1 tablet  Q6H PRN    loperamide capsule 2 mg QID PRN    melatonin tablet 6 mg Nightly PRN    morphine injection 2 mg Q4H PRN    naloxone 0.4 mg/mL injection 0.02 mg PRN    ondansetron injection 4 mg Q8H PRN    pantoprazole EC tablet 40 mg Daily    potassium chloride SA CR tablet 20 mEq Daily    pregabalin capsule 75 mg BID    promethazine tablet 25 mg Q6H PRN    senna-docusate 8.6-50 mg per tablet 1 tablet BID PRN    sodium chloride 0.9% flush 3 mL Q12H PRN     Facility-Administered Medications Ordered in Other Encounters   Medication Frequency    ondansetron injection 4 mg Once PRN     Family History       Problem Relation (Age of Onset)    Cataracts Mother    Diabetes Mother    Heart attack Mother    Heart disease Mother, Father, Brother    Stroke Father          Tobacco Use    Smoking status: Former     Packs/day: 1.50     Years: 20.00     Pack years: 30.00     Types: Cigarettes     Start date:      Quit date:      Years since quittin.3    Smokeless tobacco: Never   Substance and Sexual Activity    Alcohol use: No    Drug use: No    Sexual activity: Not Currently     Review of Systems   Constitutional: Negative.    HENT: Negative.     Eyes: Negative.    Respiratory: Negative.     Cardiovascular: Negative.    Gastrointestinal:  Positive for diarrhea.   Genitourinary: Negative.    Musculoskeletal:  Positive for back pain.   Skin: Negative.    Neurological: Negative.    Objective:     Vital Signs (Most Recent):  Temp: 97.2 °F (36.2 °C) (23 1532)  Pulse: 64 (23 1532)  Resp: 17 (23 1532)  BP: (!) 116/56 (23 1532)  SpO2: 98 % (23 1532) Vital Signs (24h Range):  Temp:  [97.2 °F (36.2 °C)-99.4 °F (37.4 °C)] 97.2 °F (36.2 °C)  Pulse:  [60-66] 64  Resp:  [16-27] 17  SpO2:  [93 %-99 %] 98 %  BP: ()/(51-65) 116/56     Weight: 95.3 kg (210 lb) (23 1341)  Body mass index is 31.93 kg/m².  Body surface area is 2.14 meters squared.    No intake/output data recorded.    Physical  Exam  Constitutional:       Appearance: Normal appearance.   HENT:      Head: Normocephalic and atraumatic.      Nose:      Comments: Mucous membranes are dry and tacky.     Mouth/Throat:      Mouth: Mucous membranes are dry.   Eyes:      General: No scleral icterus.     Extraocular Movements: Extraocular movements intact.      Pupils: Pupils are equal, round, and reactive to light.   Cardiovascular:      Rate and Rhythm: Normal rate and regular rhythm.   Pulmonary:      Effort: Pulmonary effort is normal.      Breath sounds: Normal breath sounds.   Musculoskeletal:      Right lower leg: No edema.      Left lower leg: No edema.   Skin:     General: Skin is warm and dry.   Neurological:      General: No focal deficit present.      Mental Status: He is alert and oriented to person, place, and time.   Psychiatric:         Mood and Affect: Mood normal.         Behavior: Behavior normal.       Significant Labs:  BMP:   Recent Labs   Lab 05/11/23  0634   *      K 4.0      CO2 18*   *   CREATININE 3.5*   CALCIUM 8.5*     CMP:   Recent Labs   Lab 05/11/23  0634   *   CALCIUM 8.5*   ALBUMIN 2.9*   PROT 6.6      K 4.0   CO2 18*      *   CREATININE 3.5*   ALKPHOS 63   ALT 19   AST 28   BILITOT 0.4     All labs within the past 24 hours have been reviewed.    Significant Imaging:  Labs: Reviewed      Assessment/Plan:     Active Diagnoses:    Diagnosis Date Noted POA    PRINCIPAL PROBLEM:  Acute renal failure superimposed on stage 3 chronic kidney disease [N17.9, N18.30] 09/18/2020 Yes    Weakness of both lower extremities [R29.898] 05/11/2023 Unknown    Class 1 obesity due to excess calories with serious comorbidity and body mass index (BMI) of 32.0 to 32.9 in adult [E66.09, Z68.32] 05/11/2023 Not Applicable    Symptomatic anemia [D64.9] 12/22/2021 Yes    Obstructive sleep apnea [G47.33] 06/05/2018 Yes    Chronic combined systolic and diastolic congestive heart failure [I50.42]  03/05/2018 Yes    Gastroesophageal reflux disease [K21.9] 03/05/2018 Yes    Atrial fibrillation with chronic anticoagulation with Eliquis [I48.91] 10/19/2017 Yes    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 VAIBHAV:  Creatinine on admission was up to 3.5.  BUN is also markedly elevated over his usual baseline at 102.  On review of the chart and previous office notes his Lasix was increased to 80 mg twice a day back on 05/03/2022.    On exam today he was little to no lower extremity edema.  His lungs are clear.  His mucous membranes are dry and tacky.  It appears also that he was hypotensive on presentation to the emergency department yesterday.    VAIBHAV is consistent with over diuresis and prerenal azotemia.  I would suggest holding diuretics and starting gentle hydration overnight.    Urine studies have been ordered.    2. CKD 3: Baseline creatinine usually runs around 2.0.  He had laboratory studies performed on 05/10/2023 that showed creatinine of 1.1 and a BUN of 51.    3. His potassium is stable 4.0.    4. Anemia:  He appears to have macrocytic anemia with an MCV greater than 100 this would not be consistent with anemia of chronic kidney disease.  Will defer to primary service for management.    Approximately 70 minutes was spent in face-to-face conversation, chart review and coordination of care with other providers.    Thank you for your consult.     Tavo Morel MD  Nephrology  [facility]

## 2023-05-11 NOTE — HPI
Jake Ortiz is a 82 y.o. 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 who presented to the ED for worsening bilateral lower extremity weakness x2 days duration.  Patient reported upon waking earlier this morning, his legs gave out causing him to fall on his left knee has been unable to walk/stand since then.  Patient denied endorsing any head trauma, loss of consciousness, or sustaining any other injuries in his only complaining of left knee pain described as hurting/sore in nature, constant, currently rated 5/10 in severity with aggravating factors including weight-bearing, movement, and palpation to the affected area.  He denied endorsing any lower back pain, hip pain, numbness/tingling of his lower extremities, bowel/bladder incontinence, and reported all other review of systems negative except as noted above.  Of note, patient did have his nerve stimulator manipulated yesterday and reported being in his usual state of health prior to onset of symptoms.  Initial workup in the ED revealed CT lumbar spine and hip positive for degenerative disease but negative for evidence of fractures or dislocations.  Ortho/spine was consulted by ED staff rolling further imaging via MRI with recommendations to admit to hospital medicine for continued medical management and treatment/workup of metabolic causes as well as PT/OT evaluation inpatient will be evaluated in the morning.      PCP: Byron Stevens

## 2023-05-11 NOTE — ASSESSMENT & PLAN NOTE
Currently normotensive. BP currently ranging from  systolic.  Plan:  -Optimize pain control   -Continue home medications, titrate as needed   -Monitor BP  -Low salt/cardiac diet when not NPO  -IV hydralazine prn for SBP>160 or DBP>90       5/11  -Continue to hold home losartan and spironolactone due to acute on CKD  -Resume when appropriate

## 2023-05-11 NOTE — ASSESSMENT & PLAN NOTE
Patient is identified as having Combined Systolic and Diastolic heart failure that is Chronic. CHF is currently controlled. Latest ECHO performed and demonstrates- Results for orders placed during the hospital encounter of 04/28/22    Echo    Interpretation Summary  · The left ventricle is normal in size with concentric hypertrophy and moderately decreased systolic function.  · The estimated ejection fraction is 35%.  · Grade III left ventricular diastolic dysfunction.  · Mild right ventricular enlargement with moderately reduced right ventricular systolic function.  · Mild left atrial enlargement.  · Mild right atrial enlargement.  · There is mild aortic valve stenosis.  · Aortic valve area is 1.53 cm2; peak velocity is 1.78 m/s; mean gradient is 7 mmHg.  · Mild mitral regurgitation.  · Mild tricuspid regurgitation.  · Elevated central venous pressure (15 mmHg).  · The estimated PA systolic pressure is 37 mmHg.  · There is abnormal septal wall motion. There is systolic and diastolic flattening of the interventricular septum consistent with right ventricle pressure and volume overload.  · There is moderate left ventricular global hypokinesis.  . Continue Beta Blocker, ACE/ARB, Furosemide and Aldactone and monitor clinical status closely. Monitor on telemetry. Patient is off CHF pathway.  Monitor strict Is&Os and daily weights.  Place on fluid restriction of 1.5 L. Continue to stress to patient importance of self efficacy and  on diet for CHF. Last BNP reviewed- and noted below   Recent Labs   Lab 05/10/23  1732   *

## 2023-05-11 NOTE — ASSESSMENT & PLAN NOTE
Patient presented with worsening bilateral lower extremity weakness.  CT lumbar spine and hips/pelvis showed degenerative disease.  Patient without evidence of bowel/bladder incontinence, lumbar back pain, hip pain, and has 5/5 strength throughout with the exception of 4/5 strength upon left leg raise.  Sensation to light touch grossly intact throughout.  Patient remains afebrile without leukocytosis.  UA negative for UTI.  Patient does have evidence of slight elevated anion gap metabolic acidosis with AG measuring 18 and bicarb 17. Ortho/spine consulted who recommended metabolic workup, PT/OT, and conservative therapies and patient will be evaluated in the morning.  Plan:  -Continue current pain regimen, titrate as needed  -Bowel regimen  -Bedrest  -IVFs prn  -Antiemetics prn  -Tylenol as needed for fever   -f/u ortho/spine  -f/u cultures  -PT/OT

## 2023-05-11 NOTE — PLAN OF CARE
[facility]  Initial Discharge Assessment       Primary Care Provider: Byron Stevens MD    Admission Diagnosis: Weakness [R53.1]  Left knee pain [M25.562]  Chest pain [R07.9]  Hypotension [I95.9]  Fall, initial encounter [W19.XXXA]  Acute renal failure superimposed on chronic kidney disease, unspecified CKD stage, unspecified acute renal failure type [N17.9, N18.9]  Combined systolic and diastolic congestive heart failure, unspecified HF chronicity [I50.40]    Admission Date: 5/10/2023  Expected Discharge Date: per atending         Payor: [employer] MEDICARE / Plan: INSOMENIA 65 / Product Type: Medicare Advantage /     Extended Emergency Contact Information  Primary Emergency Contact: RachelXuan martinez  Address: [address]           [place] [address] [facility]  Home Phone: [phone]  Work Phone: [phone]  Mobile Phone: [phone]  Relation: Spouse    Discharge Plan A: Home with family         [id number] - [place] - [address]  [address]  [facility] [address]  Phone: [phone] Fax: [phone]    [facility] ([facility] - [place], [place] - [address]  [address]  [address]  [facility] [address]  Phone: [phone] Fax: [phone]      Initial Assessment (most recent)       Adult Discharge Assessment - 05/11/23 1105          Discharge Assessment    Assessment Type Discharge Planning Assessment     Confirmed/corrected address, phone number and insurance Yes     Confirmed Demographics Correct on Facesheet     Source of Information patient     When was your last doctors appointment? --   2 weeks    Communicated LITTLE with patient/caregiver Date not available/Unable to determine     Reason For Admission weakness     People in Home spouse     Do you expect to return to your current living situation? Yes     Do you have help  at home or someone to help you manage your care at home? Yes     Who are your caregiver(s) and their phone number(s)? spouse     Prior to hospitilization cognitive status: Alert/Oriented     Current cognitive status: Alert/Oriented     Equipment Currently Used at Home walker, rolling;cane, straight     Readmission within 30 days? No     Patient currently being followed by outpatient case management? No     Do you currently have service(s) that help you manage your care at home? No     Do you take prescription medications? Yes     Do you have prescription coverage? Yes     Coverage people health     Do you have any problems affording any of your prescribed medications? No     Is the patient taking medications as prescribed? yes     Who is going to help you get home at discharge? spouse     How do you get to doctors appointments? family or friend will provide     Are you on dialysis? No     Do you take coumadin? No     Discharge Plan A Home with family

## 2023-05-11 NOTE — ASSESSMENT & PLAN NOTE
Currently normotensive. BP currently ranging from  systolic.  Plan:  -Optimize pain control   -Continue home medications, titrate as needed   -Monitor BP  -Low salt/cardiac diet when not NPO  -IV hydralazine prn for SBP>160 or DBP>90

## 2023-05-11 NOTE — ASSESSMENT & PLAN NOTE
Acute on chronic macrocytic anemia  Denies melena, BRBPR  Likely contributing to his fatigue/weakness  Transfuse 1 unit PRBC

## 2023-05-11 NOTE — ASSESSMENT & PLAN NOTE
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  Patient is noted to be on multiple antihypertensive medications known to be nephrotoxic.  Plan:  -Avoid nephrotoxins (holding losartan and spironolactone)  -Monitor renal function  -Renally dose medications  -IVFs prn

## 2023-05-11 NOTE — ASSESSMENT & PLAN NOTE
Patient presented with worsening bilateral lower extremity weakness.  CT lumbar spine and hips/pelvis showed degenerative disease.  Patient without evidence of bowel/bladder incontinence, lumbar back pain, hip pain, and has 5/5 strength throughout with the exception of 4/5 strength upon left leg raise.  Sensation to light touch grossly intact throughout.  Patient remains afebrile without leukocytosis.  UA negative for UTI.  Patient does have evidence of slight elevated anion gap metabolic acidosis with AG measuring 18 and bicarb 17. Ortho/spine consulted who recommended metabolic workup, PT/OT, and conservative therapies and patient will be evaluated in the morning.  Plan:  -Continue current pain regimen, titrate as needed  -Bowel regimen  -Bedrest  -IVFs prn  -Antiemetics prn  -Tylenol as needed for fever   -f/u ortho/spine  -f/u cultures  -PT/OT    5/11  -MRI lumbar spine currently pending

## 2023-05-11 NOTE — ASSESSMENT & PLAN NOTE
Patient with Paroxysmal (<7 days) atrial fibrillation which is controlled currently with Beta Blocker. Patient is currently in sinus rhythm.[id number] Score: 4. Anticoagulation indicated. Anticoagulation done with Eliquis.

## 2023-05-12 NOTE — HPI
Mr. Ortiz is an 82 year old male patient whose current medical conditions include abnormal PFT, adenocarcinoma of prostate, afib (on Eliquis), combined CHF s/p CRT-D, CAD s/p CABG, CKD stage III, and DM type II who presented to [facility] ED on 5/10/23 with a chief complaint of worsening BLE weakness x 2 days. Wife reported patient had recently been having issues with his spinal stimulator. He followed up with his pain management MD and was given a new control for the device but shortly thereafter began having increased lower extremity weakness. He suffered a fall on Wednesday (5/10) while trying to clean up ice off the floor and was too weak to get up, prompting his wife to bring him to ED. Associated symptoms included fatigue, diarrhea, and left knee soreness from his fall. Initial workup in ED revealed VAIBHAV (creatinine 3.5), anemia, and troponin of 0.250 and patient was subsequently admitted for further evaluation and treatment. Cardiology consulted to assist with management. Patient seen and examined today, sitting in bed. Still very weak in his BLE, L > R, unable to ambulate. Denies any numbness or tingling. No associated slurred speech, blurred vision, or UE weakness. No chest pain or SOB. Patient reports compliance with his medications, on Eliquis as OP. Followed routinely in clinic by Dr. Hood. Troponin elevated but trending down 0.250>0.239. CK > 200. Echo this admission showed EF of 50%, mild-mod MR. BNP on low side and stable.

## 2023-05-12 NOTE — ASSESSMENT & PLAN NOTE
Has diastolic CHF and mild systolic CHF  Pt's fluid management has taken a rather wide swing, going form lasix 80 mg po bid to current NS IVF's at 50 ml/hr  Suggest stop NS IVF's and watch  Likely will need to re-start lasix soon, but at a lower dose 20 mg po qd to bid  Noted in the past pt was on spironolactone 25 mg qd.

## 2023-05-12 NOTE — SUBJECTIVE & OBJECTIVE
Interval History: Pt was seen and examined. Labs and meds reviewed. Discussed with other providers. Pt is a 82 y.o male with CKD stage 3, DM, HTN, and diastolic CHF, who presented with leg weakness and hypotension after a recent increase in lasix to 80 mg po bid by cardiology for leg swelling. On visit today, pt has c/o's of leg cramps, but no leg swelling, no SOB. Noted also today question related to GOYO (iron sat as low as 3% in the past) and negative endoscopies in 2020.    Review of patient's allergies indicates:  No Known Allergies  Current Facility-Administered Medications   Medication Frequency    0.9%  NaCl infusion (for blood administration) Q24H PRN    acetaminophen suppository 650 mg Q6H PRN    acetaminophen tablet 650 mg Q8H PRN    aluminum-magnesium hydroxide-simethicone 200-200-20 mg/5 mL suspension 30 mL QID PRN    apixaban tablet 2.5 mg BID    atorvastatin tablet 80 mg QHS    clonazePAM tablet 1 mg Nightly PRN    dextrose 10% bolus 125 mL 125 mL PRN    dextrose 10% bolus 250 mL 250 mL PRN    dextrose 40 % gel 15,000 mg PRN    dextrose 40 % gel 30,000 mg PRN    glucagon (human recombinant) injection 1 mg PRN    HYDROcodone-acetaminophen 5-325 mg per tablet 1 tablet Q6H PRN    loperamide capsule 2 mg QID PRN    melatonin tablet 6 mg Nightly PRN    morphine injection 2 mg Q4H PRN    naloxone 0.4 mg/mL injection 0.02 mg PRN    ondansetron injection 4 mg Q8H PRN    pantoprazole EC tablet 40 mg Daily    potassium chloride SA CR tablet 20 mEq Daily    pregabalin capsule 75 mg BID    promethazine tablet 25 mg Q6H PRN    senna-docusate 8.6-50 mg per tablet 1 tablet BID PRN    sodium chloride 0.9% flush 3 mL Q12H PRN     Facility-Administered Medications Ordered in Other Encounters   Medication Frequency    ondansetron injection 4 mg Once PRN       Objective:     Vital Signs (Most Recent):  Temp: 98.7 °F (37.1 °C) (05/12/23 0731)  Pulse: 77 (05/12/23 0731)  Resp: 16 (05/12/23 0916)  BP: 138/65 (05/12/23  0731)  SpO2: 95 % (05/12/23 0731) Vital Signs (24h Range):  Temp:  [97.2 °F (36.2 °C)-100.1 °F (37.8 °C)] 98.7 °F (37.1 °C)  Pulse:  [61-77] 77  Resp:  [16-20] 16  SpO2:  [93 %-98 %] 95 %  BP: (105-144)/(53-65) 138/65     Weight: 97.9 kg (215 lb 13.3 oz) (05/12/23 0406)  Body mass index is 32.82 kg/m².  Body surface area is 2.17 meters squared.    I/O last 3 completed shifts:  In: 395.4 [P.O.:240; I.V.:155.4]  Out: -      Physical Exam  Vitals and nursing note reviewed.   Constitutional:       Appearance: Normal appearance.   Cardiovascular:      Rate and Rhythm: Normal rate and regular rhythm.      Pulses: Normal pulses.      Heart sounds: Normal heart sounds.   Pulmonary:      Effort: Pulmonary effort is normal.      Breath sounds: Normal breath sounds.   Abdominal:      General: Abdomen is flat.      Tenderness: There is no abdominal tenderness.   Musculoskeletal:      Right lower leg: No edema.      Left lower leg: No edema.   Neurological:      Mental Status: He is alert and oriented to person, place, and time.   Psychiatric:         Behavior: Behavior normal.        Significant Labs: reviewed  BMP  Lab Results   Component Value Date     05/12/2023    K 4.2 05/12/2023     05/12/2023    CO2 19 (L) 05/12/2023    BUN 99 (H) 05/12/2023    CREATININE 2.9 (H) 05/12/2023    CALCIUM 8.8 05/12/2023    ANIONGAP 14 05/12/2023    EGFRNORACEVR 21 (A) 05/12/2023     Lab Results   Component Value Date    WBC 8.60 05/12/2023    HGB 8.0 (L) 05/12/2023    HCT 25.4 (L) 05/12/2023    MCV 98 05/12/2023     05/12/2023     Lab Results   Component Value Date    IRON 102 02/24/2023    TRANSFERRIN 217 02/24/2023    TIBC 321 02/24/2023    FESATURATED 32 02/24/2023      Iron sat in the past as low as 3% twice    Significant Imaging: CXR reviewed, cardiomegaly, mild vascular congestion

## 2023-05-12 NOTE — ASSESSMENT & PLAN NOTE
Patient with Paroxysmal (<7 days) atrial fibrillation which is controlled currently with Beta Blocker. Patient is currently in sinus rhythm.[id number] Score: 4. Anticoagulation indicated. Anticoagulation done with Eliquis.

## 2023-05-12 NOTE — ASSESSMENT & PLAN NOTE
1. Renal: s Cr stable overall, but tends to fluctuate.  CKD stage 3 at baseline.  Pt has no fluid gain, will reduce lasix 40 mg bid to 20 mg bid  s Cr fluctuations likely due to hemodynamic factors.     2. Cardiac: h/o of diastolic CHF  Well compensated and stable  Salt and fluid moderation advised  Continue lower dose lasix     3. HTN: BP controlled to slightly low.  Lower dose lasix will help     4. DM-2: reviewed the chart  HgaA1C: only slightly high     5. Anemia: noted, and reviewed the chart  GOYO  On eliquis that can cause GI bleed  But ferritin was not low  Had colonoscopy 1 year ago  Will defer to hem/onc

## 2023-05-12 NOTE — SUBJECTIVE & OBJECTIVE
Review of Systems   All other systems reviewed and are negative.  Objective:     Vital Signs (Most Recent):  Temp: 99.9 °F (37.7 °C) (05/12/23 1616)  Pulse: 60 (05/12/23 1616)  Resp: 18 (05/12/23 1616)  BP: (!) 121/57 (05/12/23 1616)  SpO2: 96 % (05/12/23 1616) Vital Signs (24h Range):  Temp:  [98.4 °F (36.9 °C)-100.1 °F (37.8 °C)] 99.9 °F (37.7 °C)  Pulse:  [60-77] 60  Resp:  [16-20] 18  SpO2:  [94 %-98 %] 96 %  BP: (107-138)/(55-65) 121/57     Weight: 97.9 kg (215 lb 13.3 oz)  Body mass index is 32.82 kg/m².    Intake/Output Summary (Last 24 hours) at 5/12/2023 1718  Last data filed at 5/12/2023 0643  Gross per 24 hour   Intake 275.41 ml   Output --   Net 275.41 ml         Physical Exam  Constitutional:       General: He is not in acute distress.     Appearance: Normal appearance. He is obese. He is ill-appearing.   Cardiovascular:      Rate and Rhythm: Normal rate and regular rhythm.      Heart sounds: No murmur heard.  Pulmonary:      Effort: Pulmonary effort is normal. No respiratory distress.      Breath sounds: Normal breath sounds. No wheezing.   Abdominal:      General: There is distension.      Palpations: Abdomen is soft.      Tenderness: There is no abdominal tenderness.   Skin:     Coloration: Skin is pale.   Neurological:      Mental Status: He is alert.           Significant Labs: All pertinent labs within the past 24 hours have been reviewed.  CBC:   Recent Labs   Lab 05/10/23  1732 05/11/23  0634 05/12/23  0634   WBC 10.29 10.08 8.60   HGB 7.8* 7.2* 8.0*   HCT 25.3* 23.3* 25.4*    177 170     CMP:   Recent Labs   Lab 05/10/23  1732 05/11/23  0634 05/12/23  0634    136 138   K 3.8 4.0 4.2    104 105   CO2 17* 18* 19*   * 151* 138*   BUN 91* 102* 99*   CREATININE 3.5* 3.5* 2.9*   CALCIUM 8.5* 8.5* 8.8   PROT 7.2 6.6 6.7   ALBUMIN 3.2* 2.9* 2.9*   BILITOT 0.3 0.4 0.4   ALKPHOS 69 63 63   AST 34 28 33   ALT 20 19 25   ANIONGAP 18* 14 14       Significant Imaging: I have  reviewed all pertinent imaging results/findings within the past 24 hours.

## 2023-05-12 NOTE — PT/OT/SLP PROGRESS
Physical Therapy      Patient Name:  Jake Ortiz   MRN:  [id number]    1040 P.T. EVAL IN PROGRESS. P.T. REC SNF @ D/C. COMPLETE EVAL TO FOLLOW. THANK YOU Amairani Vigil, PT,5/12/2023

## 2023-05-12 NOTE — PROGRESS NOTES
[facility]  Nephrology  Progress Note    Patient Name: Jake Ortiz  MRN: [id number]  Admission Date: 5/10/2023  Hospital Length of Stay: 1 days  Attending Provider: Bobby Garcia MD   Primary Care Physician: Byron Stevens MD  Principal Problem:Acute renal failure superimposed on stage 3 chronic kidney disease    Subjective:     HPI: Noted    Interval History: Pt was seen and examined. Labs and meds reviewed. Discussed with other providers. Pt is a 82 y.o male with CKD stage 3, DM, HTN, and diastolic CHF, who presented with leg weakness and hypotension after a recent increase in lasix to 80 mg po bid by cardiology for leg swelling. He also reported dirrhea x 1 day before admit, which has resolvd now, no GI c/o's. On visit today, pt has c/o's of leg cramps, but no leg swelling, no SOB. Noted also today question related to GOYO (iron sat as low as 3% in the past) and negative endoscopies in 2020.    Review of patient's allergies indicates:  No Known Allergies  Current Facility-Administered Medications   Medication Frequency    0.9%  NaCl infusion (for blood administration) Q24H PRN    acetaminophen suppository 650 mg Q6H PRN    acetaminophen tablet 650 mg Q8H PRN    aluminum-magnesium hydroxide-simethicone 200-200-20 mg/5 mL suspension 30 mL QID PRN    apixaban tablet 2.5 mg BID    atorvastatin tablet 80 mg QHS    clonazePAM tablet 1 mg Nightly PRN    dextrose 10% bolus 125 mL 125 mL PRN    dextrose 10% bolus 250 mL 250 mL PRN    dextrose 40 % gel 15,000 mg PRN    dextrose 40 % gel 30,000 mg PRN    glucagon (human recombinant) injection 1 mg PRN    HYDROcodone-acetaminophen 5-325 mg per tablet 1 tablet Q6H PRN    loperamide capsule 2 mg QID PRN    melatonin tablet 6 mg Nightly PRN    morphine injection 2 mg Q4H PRN    naloxone 0.4 mg/mL injection 0.02 mg PRN    ondansetron injection 4 mg Q8H PRN    pantoprazole EC tablet 40 mg Daily    potassium chloride SA CR tablet 20 mEq Daily    pregabalin capsule 75 mg  BID    promethazine tablet 25 mg Q6H PRN    senna-docusate 8.6-50 mg per tablet 1 tablet BID PRN    sodium chloride 0.9% flush 3 mL Q12H PRN     Facility-Administered Medications Ordered in Other Encounters   Medication Frequency    ondansetron injection 4 mg Once PRN       Objective:     Vital Signs (Most Recent):  Temp: 98.7 °F (37.1 °C) (05/12/23 0731)  Pulse: 77 (05/12/23 0731)  Resp: 16 (05/12/23 0916)  BP: 138/65 (05/12/23 0731)  SpO2: 95 % (05/12/23 0731) Vital Signs (24h Range):  Temp:  [97.2 °F (36.2 °C)-100.1 °F (37.8 °C)] 98.7 °F (37.1 °C)  Pulse:  [61-77] 77  Resp:  [16-20] 16  SpO2:  [93 %-98 %] 95 %  BP: (105-144)/(53-65) 138/65     Weight: 97.9 kg (215 lb 13.3 oz) (05/12/23 0406)  Body mass index is 32.82 kg/m².  Body surface area is 2.17 meters squared.    I/O last 3 completed shifts:  In: 395.4 [P.O.:240; I.V.:155.4]  Out: -      Physical Exam  Vitals and nursing note reviewed.   Constitutional:       Appearance: Normal appearance.   Cardiovascular:      Rate and Rhythm: Normal rate and regular rhythm.      Pulses: Normal pulses.      Heart sounds: Normal heart sounds.   Pulmonary:      Effort: Pulmonary effort is normal.      Breath sounds: Normal breath sounds.   Abdominal:      General: Abdomen is flat.      Tenderness: There is no abdominal tenderness.   Musculoskeletal:      Right lower leg: No edema.      Left lower leg: No edema.   Neurological:      Mental Status: He is alert and oriented to person, place, and time.   Psychiatric:         Behavior: Behavior normal.        Significant Labs: reviewed  BMP  Lab Results   Component Value Date     05/12/2023    K 4.2 05/12/2023     05/12/2023    CO2 19 (L) 05/12/2023    BUN 99 (H) 05/12/2023    CREATININE 2.9 (H) 05/12/2023    CALCIUM 8.8 05/12/2023    ANIONGAP 14 05/12/2023    EGFRNORACEVR 21 (A) 05/12/2023     Lab Results   Component Value Date    WBC 8.60 05/12/2023    HGB 8.0 (L) 05/12/2023    HCT 25.4 (L) 05/12/2023    MCV 98  05/12/2023     05/12/2023     Lab Results   Component Value Date    IRON 102 02/24/2023    TRANSFERRIN 217 02/24/2023    TIBC 321 02/24/2023    FESATURATED 32 02/24/2023      Iron sat in the past as low as 3% twice    Significant Imaging: CXR reviewed, cardiomegaly, mild vascular congestion    Assessment/Plan:     83 y/o male presented with leg weakness after lasix dose was recently increased:    Chronic combined systolic and diastolic congestive heart failure  Has diastolic CHF and mild systolic CHF  Pt's fluid management has taken a rather wide swing, going from lasix 80 mg po bid to current NS IVF's at 50 ml/hr  Suggest stop NS IVF's and watch  Likely will need to re-start lasix soon, but at a lower dose 20 mg po qd to bid  Noted in the past pt was on spironolactone 25 mg qd.    * Acute renal failure superimposed on stage 3 chronic kidney disease  VAIBHAV is mild, likely due to low BP and recent diarrhea; also from overdiuresis  s Cr in the past tended to fluctuate.  CKD stage 3 at baseline.    Currently has Pt no significant fluid gain  Lasix with recent dose increase to 80 mg po bid is on hold  Receiving NS IVF's at 50 ml/hr  Suggest stop IVF's to prevent fluid gain rebound      HTN: BP was low on admit, likely due to overdiuresis and diarrhea  Hypotension has resolved. BP normal today     H/o of DM-2: reviewed the chart    Symptomatic anemia  Some the symptoms related to leg crams may be due to iron deficiency anemia  Low iron sats as low as 3% in the past  Prior endoscopies in 2020 unremarkable  Anemia likely multifactorial: iron loss due to eliquis + CKD + poor diet    Suggest repeat iron panel, replace if low  Epogen OK  Consider PRBC transfusion      Plans and recommendations:  As discussed above  Complex case with multiple issues  Total critical care quality time spent 45 minutes including time needed to review the records, the   patient evaluation, documentation, face-to-face discussion with the  patient,   more than 50% of the time was spent on coordination of care and counseling.        Rusty Aquino MD  Nephrology  [facility]

## 2023-05-12 NOTE — ASSESSMENT & PLAN NOTE
Patient currently follows Dr. Hood and is currently without chest pain or shortness of breath. Troponin was noted to be elevated at 0.250 with repeat 0.239 in absence of chest pain.  EKG showed paced rhythm.  Elevation likely secondary to VAIBHAV but will continue to monitor to rule out ACS.  Current hemoglobin measuring 7.8 and is below threshold for transfusion in known cardiac patient.  Will type/strain and prepare 1 unit but hold transfusion following repeat H/H this morning.  Plan:  -telemetry  -trend troponin  -f/u labs  -type/screen, transfuse if repeat hemoglobin less than 8  -serial EKGs p.r.n. chest pain  -continue home medications, titrate as needed  -RAMIREZ therapy p.r.n.    -Cardiology following

## 2023-05-12 NOTE — ASSESSMENT & PLAN NOTE
-Unclear etiology, needs CVA ruled out (CT of head with NAF)  -? Related to back issues/pain stimulator

## 2023-05-12 NOTE — ASSESSMENT & PLAN NOTE
Currently normotensive. BP currently ranging from  systolic.  Plan:  -Optimize pain control   -Continue home medications, titrate as needed   -Monitor BP  -Low salt/cardiac diet when not NPO  -IV hydralazine prn for SBP>160 or DBP>90       5/12  -Continue to hold home losartan and spironolactone due to acute on CKD  -Resume when appropriate

## 2023-05-12 NOTE — ASSESSMENT & PLAN NOTE
-Echo this admission showed EF of 50%  -Came in dehydrated, secondary to diarrhea/over-diuresis  -Can resume po Lasix soon  -Hold ARB and Aldactone for now  -Monitor volume status closely

## 2023-05-12 NOTE — SUBJECTIVE & OBJECTIVE
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    #4 PROSTATE BIOPSY, LEFT APEX: ADENOCARCINOMA, FARHAD GRADE 3 + 3 = 6, IN 1 of 2 CORES 9/8/2014    Anemia     Arthritis     Atrial fibrillation 10/19/2017    Back pain     Congestive heart failure 03/05/2018    Coronary artery disease     DM (diabetes mellitus) 2018     am 06/23/2020    DM (diabetes mellitus) 2016     am 08/17/2021    Hyperlipemia     Hypertension     Myocardial infarction     NASREEN (obstructive sleep apnea) 06/05/2018    Prostate cancer 2015    Tobacco dependence     Type 2 diabetes mellitus        Past Surgical History:   Procedure Laterality Date    COLONOSCOPY N/A 9/22/2020    Procedure: COLONOSCOPY;  Surgeon: Javier Farmer MD;  Location: [facility] ENDO;  Service: Endoscopy;  Laterality: N/A;    CORONARY ARTERY BYPASS GRAFT  1987    EPIDURAL STEROID INJECTION N/A 11/22/2019    Procedure: Lumbar L5/S1 IL XUAN;  Surgeon: Tacho Trivedi MD;  Location: [facility];  Service: Pain Management;  Laterality: N/A;    EPIDURAL STEROID INJECTION N/A 1/6/2020    Procedure: Lumbar L5/S1 IL XUAN;  Surgeon: Tacho Trivedi MD;  Location: [facility];  Service: Pain Management;  Laterality: N/A;    EPIDURAL STEROID INJECTION N/A 2/10/2020    Procedure: Caudal XUAN;  Surgeon: Tacho Trivedi MD;  Location: [facility];  Service: Pain Management;  Laterality: N/A;    ESOPHAGOGASTRODUODENOSCOPY N/A 9/22/2020    Procedure: EGD (ESOPHAGOGASTRODUODENOSCOPY);  Surgeon: Javier Farmer MD;  Location: [facility] ENDO;  Service: Endoscopy;  Laterality: N/A;    INJECTION OF ANESTHETIC AGENT AROUND MEDIAL BRANCH NERVES INNERVATING LUMBAR FACET JOINT Bilateral 10/12/2020    Procedure: Bilateral L3-5 MBB;  Surgeon: Tacho Trivedi MD;  Location: [facility];  Service: Pain Management;  Laterality: Bilateral;    INJECTION OF ANESTHETIC AGENT AROUND MEDIAL BRANCH NERVES INNERVATING LUMBAR  FACET JOINT Bilateral 12/21/2020    Procedure: Bilateral L3-5 MBB with steroid;  Surgeon: Tacho Trivedi MD;  Location: [facility] PAIN MGT;  Service: Pain Management;  Laterality: Bilateral;    INSERTION OF SPINAL NEUROSTIMULATOR N/A 3/23/2023    Procedure: INSERTION, NEUROSTIMULATOR, SPINAL;  Surgeon: Philipp Demarco MD;  Location: [facility] OR;  Service: Pain Management;  Laterality: N/A;    INTRALUMINAL GASTROINTESTINAL TRACT IMAGING VIA CAPSULE N/A 12/29/2021    Procedure: IMAGING PROCEDURE, GI TRACT, INTRALUMINAL, VIA CAPSULE;  Surgeon: Tahir Geronimo RN;  Location: [facility] ENDO;  Service: Endoscopy;  Laterality: N/A;    PROSTATE SURGERY      prostate radiation    RADIOFREQUENCY THERMOCOAGULATION Left 6/4/2021    Procedure: Left L3-5 Lumbar RFA;  Surgeon: Tacho Trivedi MD;  Location: [facility] PAIN MGT;  Service: Pain Management;  Laterality: Left;    RADIOFREQUENCY THERMOCOAGULATION Right 6/18/2021    Procedure: Right L3-5 Lumbar RFA;  Surgeon: Tacho Trivedi MD;  Location: [facility] PAIN MGT;  Service: Pain Management;  Laterality: Right;    REPLACEMENT OF PACEMAKER GENERATOR Left 6/16/2022    Procedure: REPLACEMENT, PULSE GENERATOR, CARDIAC PACEMAKER/CRT-P device;  Surgeon: Darrel Carrero MD;  Location: [facility] CATH LAB;  Service: Cardiology;  Laterality: Left;  NOT MRI safe   Pacer and leads implanted 9/30/2017, Dr. Souleymane SANTACRUZ Consulta CRT-P C4TR01, [id number]   A lead: Jovan 5076 CapSureFix® Novus, [id number]   RV lead: Jovan 5076 CapSureFix® Novus, [id number]   LV lead: Jovan 4196 At    SELECTIVE INJECTION OF ANESTHETIC AGENT AROUND LUMBAR SPINAL NERVE ROOT BY TRANSFORAMINAL APPROACH Bilateral 6/8/2022    Procedure: Bilateral L3/4 TF XUAN with RN IV sedation;  Surgeon: Philipp Demarco MD;  Location: [facility] PAIN MGT;  Service: Pain Management;  Laterality: Bilateral;    SPINE SURGERY      fusion    TONSILLECTOMY      TRIAL OF SPINAL CORD NERVE STIMULATOR N/A 1/25/2023    Procedure: Trial, Neurostimulator, Spinal Cord with RN  IV sedation;  Surgeon: Philipp Demarco MD;  [facility]  Service: Pain Management;  Laterality: N/A;       Review of patient's allergies indicates:  No Known Allergies    Current Facility-Administered Medications on File Prior to Encounter   Medication    ondansetron injection 4 mg     Current Outpatient Medications on File Prior to Encounter   Medication Sig    acetaminophen (TYLENOL) 500 MG tablet Take 2 tablets (1,000 mg total) by mouth every 6 (six) hours as needed for Pain.    apixaban (ELIQUIS) 2.5 mg Tab Take 1 tablet (2.5 mg total) by mouth 2 (two) times daily.    atorvastatin (LIPITOR) 80 MG tablet Take 1 tablet (80 mg total) by mouth every evening.    cefadroxil (DURICEF) 500 MG Cap Take 1 capsule (500 mg total) by mouth every 12 (twelve) hours.    cholecalciferol, vitamin D3, (VITAMIN D3) 25 mcg (1,000 unit) capsule Take 1,000 Units by mouth once daily.    furosemide (LASIX) 40 MG tablet Take 1 tablet (40 mg total) by mouth 2 (two) times daily. Can double to 2 tablets twice a day for 3 days for more swelling/fluid build up - take 80mg twice a day    krill/om-3/dha/epa/phospho/ast (MEGARED OMEGA-3 KRILL OIL ORAL) Take 1 capsule by mouth every morning.    levocetirizine (XYZAL) 5 MG tablet Take 1 tablet (5 mg total) by mouth every evening.    losartan (COZAAR) 50 MG tablet Take 1 tablet (50 mg total) by mouth once daily.    multivitamin capsule Take 1 capsule by mouth once daily. Takes once a week    pantoprazole (PROTONIX) 40 MG tablet Take 1 tablet (40 mg total) by mouth once daily.    potassium chloride SA (K-DUR,KLOR-CON) 20 MEQ tablet Take 1 tablet (20 mEq total) by mouth once daily.    pregabalin (LYRICA) 75 MG capsule Take 1 capsule QHS x 1 week, then increase to BID (if tolerated).    pyridoxine, vitamin B6, (B-6) 100 MG Tab Take 50 mg by mouth once daily.    spironolactone (ALDACTONE) 25 MG tablet Take 1 tablet (25 mg total) by mouth once daily.    vit A/vit C/vit E/zinc/copper (OCUVITE  PRESERVISION ORAL) Take 1 capsule by mouth every evening.    blood sugar diagnostic Strp Check blood glucose levels daily in the AM fasting and 1-2 times more daily.    clonazePAM (KLONOPIN) 1 MG tablet Take 1 tablet (1 mg total) by mouth nightly as needed for Anxiety.    fluticasone propionate (FLONASE) 50 mcg/actuation nasal spray 2 sprays (100 mcg total) by Each Nostril route once daily.    lancets Misc Check blood glucose levels daily in the AM fasting and 1-2 times more daily.     Family History       Problem Relation (Age of Onset)    Cataracts Mother    Diabetes Mother    Heart attack Mother    Heart disease Mother, Father, Brother    Stroke Father          Tobacco Use    Smoking status: Former     Packs/day: 1.50     Years: 20.00     Pack years: 30.00     Types: Cigarettes     Start date:      Quit date:      Years since quittin.3    Smokeless tobacco: Never   Substance and Sexual Activity    Alcohol use: No    Drug use: No    Sexual activity: Not Currently     Review of Systems   Constitutional: Positive for malaise/fatigue.   HENT: Negative.     Eyes: Negative.    Cardiovascular: Negative.    Respiratory: Negative.     Endocrine: Negative.    Skin: Negative.    Musculoskeletal:  Positive for joint pain.   Gastrointestinal: Negative.    Genitourinary: Negative.    Neurological:         BLE weakness     Psychiatric/Behavioral: Negative.     Allergic/Immunologic: Negative.    Objective:     Vital Signs (Most Recent):  Temp: 98.7 °F (37.1 °C) (23)  Pulse: 77 (23)  Resp: 16 (23)  BP: 138/65 (23)  SpO2: 95 % (23) Vital Signs (24h Range):  Temp:  [97.2 °F (36.2 °C)-100.1 °F (37.8 °C)] 98.7 °F (37.1 °C)  Pulse:  [61-77] 77  Resp:  [16-20] 16  SpO2:  [94 %-98 %] 95 %  BP: (107-144)/(55-65) 138/65     Weight: 97.9 kg (215 lb 13.3 oz)  Body mass index is 32.82 kg/m².    SpO2: 95 %         Intake/Output Summary (Last 24 hours) at 2023 1202  Last  data filed at 5/12/2023 0643  Gross per 24 hour   Intake 395.41 ml   Output --   Net 395.41 ml       Lines/Drains/Airways       Peripheral Intravenous Line  Duration                  Peripheral IV - Single Lumen 05/11/23 1500 22 G Anterior;Right Forearm <1 day                     Physical Exam  Vitals and nursing note reviewed.   Constitutional:       General: He is not in acute distress.     Appearance: Normal appearance. He is well-developed. He is ill-appearing. He is not diaphoretic.   HENT:      Head: Normocephalic and atraumatic.   Eyes:      General:         Right eye: No discharge.         Left eye: No discharge.      Pupils: Pupils are equal, round, and reactive to light.   Neck:      Thyroid: No thyromegaly.      Vascular: No JVD.      Trachea: No tracheal deviation.   Cardiovascular:      Rate and Rhythm: Normal rate and regular rhythm.      Chest Wall: PMI is not displaced.      Pulses: Intact distal pulses.      Heart sounds: Normal heart sounds. No murmur heard.    No friction rub. No gallop. No S3 or S4 sounds.      Comments: CRT-D site well-healed  Pulmonary:      Effort: Pulmonary effort is normal. No respiratory distress.      Breath sounds: Normal breath sounds. No wheezing or rales.   Abdominal:      General: There is no distension.      Tenderness: There is no rebound.   Musculoskeletal:      Cervical back: Neck supple.      Right lower leg: No edema.      Left lower leg: No edema.   Skin:     General: Skin is warm and dry.      Findings: No erythema.   Neurological:      Mental Status: He is alert and oriented to person, place, and time.      Comments: BLE weakness, L > R   Psychiatric:         Mood and Affect: Mood normal.         Behavior: Behavior normal.         Thought Content: Thought content normal.        Significant Labs: CMP   Recent Labs   Lab 05/10/23  1732 05/11/23  0634 05/12/23  0634    136 138   K 3.8 4.0 4.2    104 105   CO2 17* 18* 19*   * 151* 138*   BUN  91* 102* 99*   CREATININE 3.5* 3.5* 2.9*   CALCIUM 8.5* 8.5* 8.8   PROT 7.2 6.6 6.7   ALBUMIN 3.2* 2.9* 2.9*   BILITOT 0.3 0.4 0.4   ALKPHOS 69 63 63   AST 34 28 33   ALT 20 19 25   ANIONGAP 18* 14 14   , CBC   Recent Labs   Lab 05/10/23  1732 05/11/23  0634 05/12/23  0634   WBC 10.29 10.08 8.60   HGB 7.8* 7.2* 8.0*   HCT 25.3* 23.3* 25.4*    177 170   , Troponin   Recent Labs   Lab 05/10/23  1732 05/10/23  2039   TROPONINI 0.250* 0.239*   , and All pertinent lab results from the last 24 hours have been reviewed.    Significant Imaging: Echocardiogram: Transthoracic echo (TTE) complete (Cupid Only):   Results for orders placed or performed during the hospital encounter of 05/10/23   Echo   Result Value Ref Range    BSA 2.14 m2    LA WIDTH 4.20 cm    IVC diameter 2.87 cm    Left Ventricular Outflow Tract Mean Velocity 0.56 cm/s    Left Ventricular Outflow Tract Mean Gradient 1.55 mmHg    LVIDd 6.51 (A) 3.5 - 6.0 cm    IVS 0.96 0.6 - 1.1 cm    Posterior Wall 0.85 0.6 - 1.1 cm    Ao root annulus 3.83 cm    LVIDs 4.59 (A) 2.1 - 4.0 cm    FS 29 28 - 44 %    LA volume 132.20 cm3    STJ 3.46 cm    Ascending aorta 3.17 cm    LV mass 250.23 g    LA size 5.31 cm    RVDD 4.24 cm    TAPSE 1.90 cm    Left Ventricle Relative Wall Thickness 0.26 cm    AV mean gradient 6 mmHg    AV valve area 1.83 cm2    AV Velocity Ratio 0.58     AV index (prosthetic) 0.57     MV valve area p 1/2 method 2.75 cm2    E/A ratio 3.60     E wave deceleration time 276.19 msec    IVRT 51.38 msec    LVOT diameter 2.02 cm    LVOT area 3.2 cm2    LVOT peak wilian 0.91 m/s    LVOT peak VTI 16.70 cm    Ao peak wilian 1.57 m/s    Ao VTI 29.3 cm    RVOT peak wilian 0.77 m/s    RVOT peak VTI 14.5 cm    Mr max wilian 4.78 m/s    LVOT stroke volume 53.49 cm3    AV peak gradient 10 mmHg    PV mean gradient 1.03 mmHg    MV Peak E Wilian 1.08 m/s    TR Max Wilian 3.08 m/s    MV stenosis pressure 1/2 time 80.09 ms    MV Peak A Wilian 0.30 m/s    LV Systolic Volume 96.76 mL    LV  Systolic Volume Index 46.3 mL/m2    LV Diastolic Volume 216.76 mL    LV Diastolic Volume Index 103.71 mL/m2    LA Volume Index 63.3 mL/m2    LV Mass Index 120 g/m2    RA Major Axis 7.76 cm    Left Atrium Minor Axis 6.88 cm    Left Atrium Major Axis 7.07 cm    Triscuspid Valve Regurgitation Peak Gradient 38 mmHg    EF 50 %    Narrative    · Eccentric hypertrophy and low normal systolic function.  · Moderate left atrial enlargement.  · The estimated ejection fraction is 50%.  · Indeterminate left ventricular diastolic function.  · There is abnormal septal wall motion consistent with left bundle branch   block.  · Normal right ventricular size with normal right ventricular systolic   function.  · Mild-to-moderate mitral regurgitation.  · Mild to moderate tricuspid regurgitation.      , EKG: Reviewed, and X-Ray: CXR: X-Ray Chest 1 View (CXR): No results found for this visit on 05/10/23. and X-Ray Chest PA and Lateral (CXR): No results found for this visit on 05/10/23.

## 2023-05-12 NOTE — NURSING
Walked in on pts wife attempting to help pt out of bed to bedside commode again, reeducated pt and pts wife on importance of bed alarm. Pt refuses to be reeducated.

## 2023-05-12 NOTE — ASSESSMENT & PLAN NOTE
Acute on chronic macrocytic anemia  Likely contributing to his fatigue/weakness  Transfuse 1 unit PRBC (5/12)  Fecal occult blood+ patient does reports dark, tarry stools  Has previously had EGD and c-scope in 2021

## 2023-05-12 NOTE — ASSESSMENT & PLAN NOTE
Body mass index is 32.82 kg/m². Morbid obesity complicates all aspects of disease management from diagnostic modalities to treatment. Weight loss encouraged and health benefits explained to patient.

## 2023-05-12 NOTE — PT/OT/SLP EVAL
Occupational Therapy   Evaluation    Name: Jake Ortiz  MRN: [id number]  Admitting Diagnosis: Acute renal failure superimposed on stage 3 chronic kidney disease  Recent Surgery: * No surgery found *      Recommendations:     Discharge Recommendations: nursing facility, skilled  Discharge Equipment Recommendations:  walker, rolling, to be determined by next level of care  Barriers to discharge:  Decreased caregiver support    Assessment:     Jake Ortiz is a 82 y.o. male with a medical diagnosis of Acute renal failure superimposed on stage 3 chronic kidney disease.  He presents with the following performance deficits affecting function: weakness, impaired endurance, impaired self care skills, impaired functional mobility, gait instability, impaired balance, decreased lower extremity function, decreased safety awareness, pain, decreased ROM.      Rehab Prognosis: Good; patient would benefit from acute skilled OT services to address these deficits and reach maximum level of function.       Plan:     Patient to be seen 2 x/week to address the above listed problems via self-care/home management, therapeutic activities, therapeutic exercises  Plan of Care Expires: 05/26/23  Plan of Care Reviewed with: patient, spouse    Subjective     Chief Complaint: LLE and knee pain  Patient/Family Comments/goals: improve strength and mobility    Occupational Profile:  Living Environment: lives with wife in a 1 story house with 1 step to enter.  Previous level of function: Pt (I) with ADLs and Mod (I) with functional mobility using a rollator. Pt has nerve stimulator in back.  Roles and Routines: drives  Equipment Used at Home: rollator  Assistance upon Discharge: unknown, wife unable to provide physical assist.    Pain/Comfort:  Pain Rating 1: 3/10  Location - Side 1: Left  Location - Orientation 1: generalized  Location 1: knee  Pain Addressed 1: Reposition, Distraction  Pain Rating Post-Intervention 1: 3/10    Objective:      Communicated with: Nurse and epic chart review prior to session.  Patient found sitting edge of bed with peripheral IV, telemetry upon OT entry to room.    General Precautions: Standard, fall  Orthopedic Precautions: N/A  Braces: N/A  Respiratory Status: Room air    Functional Mobility/Transfers:  Patient completed Sit <> Stand Transfer with minimum assistance  with  rolling walker   Patient completed Bed <> Chair Transfer using Stand Pivot technique with minimum assistance with rolling walker  Functional Mobility: Patient completed x40ft functional mobility with Min A and RW to increase dynamic standing balance and activity tolerance needed for ADL completion.     Activities of Daily Living:  Upper Body Dressing: stand by assistance shannan gown around back  Lower Body Dressing: stand by assistance shannan tennis shoes EOB, requires assist to tie shoes.    Cognitive/Visual Perceptual:  Cognitive/Psychosocial Skills:     -       Oriented to: Person, Place, Time, and Situation   -       Follows Commands/attention:Follows multistep  commands  -       Communication: clear/fluent  -       Memory: No Deficits noted  -       Safety awareness/insight to disability: intact     Physical Exam:  Sensation:    -       Intact  Dominant hand:    -       right  Upper Extremity Range of Motion:     -       Right Upper Extremity: WNL  -       Left Upper Extremity: WNL  Upper Extremity Strength:    -       Right Upper Extremity: 4+/5 grossly  -       Left Upper Extremity: 4+/5 grossly   Strength:    -       Right Upper Extremity: WFL  -       Left Upper Extremity: WFL    AMPAC 6 Click ADL:  AMPAC Total Score: 18    Treatment & Education:  Patient educated on role of OT in acute setting and benefits of participation. Educated on techniques to use to increase independence and decrease fall risk with functional transfers. Educated on importance of OOB activity and calling for A to transfer back to bed or to BSC and meet needs.  Encouraged completion of B UE AROM therex throughout the day to tolerance to increase functional strength and activity tolerance. Educated patient on importance of increased tolerance to upright position and direct impact on CV endurance and strength. Patient encouraged to sit up in chair for a minimum of 2 consecutive hours per day. Patient stated understanding and in agreement with POC.     Patient left up in chair with all lines intact, call button in reach, chair alarm on, and wife present    GOALS:   Multidisciplinary Problems       Occupational Therapy Goals          Problem: Occupational Therapy    Goal Priority Disciplines Outcome Interventions   Occupational Therapy Goal     OT, PT/OT     Description: Goals to be met by: 5/26/23     Patient will increase functional independence with ADLs by performing:    UE Dressing with [place].  Toileting from toilet with Modified [place] for hygiene and clothing management.   Stand pivot transfers with Modified [place].  Upper extremity exercise program x20 reps per handout, with independence.                         History:     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    #4 PROSTATE BIOPSY, LEFT APEX: ADENOCARCINOMA, FARHAD GRADE 3 + 3 = 6, IN 1 of 2 CORES 9/8/2014    Anemia     Arthritis     Atrial fibrillation 10/19/2017    Back pain     Congestive heart failure 03/05/2018    Coronary artery disease     DM (diabetes mellitus) 2018     am 06/23/2020    DM (diabetes mellitus) 2016     am 08/17/2021    Hyperlipemia     Hypertension     Myocardial infarction     NASREEN (obstructive sleep apnea) 06/05/2018    Prostate cancer 2015    Tobacco dependence     Type 2 diabetes mellitus          Past Surgical History:   Procedure Laterality Date    COLONOSCOPY N/A 9/22/2020    Procedure: COLONOSCOPY;  Surgeon: Javier Farmer MD;  Location: [facility];  Service: Endoscopy;  Laterality:  N/A;    CORONARY ARTERY BYPASS GRAFT  1987    EPIDURAL STEROID INJECTION N/A 11/22/2019    Procedure: Lumbar L5/S1 IL XUAN;  Surgeon: Tacho Trivedi MD;  [facility];  Service: Pain Management;  Laterality: N/A;    EPIDURAL STEROID INJECTION N/A 1/6/2020    Procedure: Lumbar L5/S1 IL XUAN;  Surgeon: Tacho Trivedi MD;  [facility];  Service: Pain Management;  Laterality: N/A;    EPIDURAL STEROID INJECTION N/A 2/10/2020    Procedure: Caudal XUAN;  Surgeon: Tacho Trivedi MD;  Location: [facility] PAIN MGT;  Service: Pain Management;  Laterality: N/A;    ESOPHAGOGASTRODUODENOSCOPY N/A 9/22/2020    Procedure: EGD (ESOPHAGOGASTRODUODENOSCOPY);  Surgeon: Javier Farmer MD;  Location: [facility] ENDO;  Service: Endoscopy;  Laterality: N/A;    INJECTION OF ANESTHETIC AGENT AROUND MEDIAL BRANCH NERVES INNERVATING LUMBAR FACET JOINT Bilateral 10/12/2020    Procedure: Bilateral L3-5 MBB;  Surgeon: Tacho Trivedi MD;  Location: [facility] PAIN [facility]GT;  Service: Pain Management;  Laterality: Bilateral;    INJECTION OF ANESTHETIC AGENT AROUND MEDIAL BRANCH NERVES INNERVATING LUMBAR FACET JOINT Bilateral 12/21/2020    Procedure: Bilateral L3-5 MBB with steroid;  Surgeon: Tacho Trivedi MD;  Location: [facility] PAIN MGT;  Service: Pain Management;  Laterality: Bilateral;    INSERTION OF SPINAL NEUROSTIMULATOR N/A 3/23/2023    Procedure: INSERTION, NEUROSTIMULATOR, SPINAL;  Surgeon: Philipp Demarco MD;  Location: [facility] OR;  Service: Pain Management;  Laterality: N/A;    INTRALUMINAL GASTROINTESTINAL TRACT IMAGING VIA CAPSULE N/A 12/29/2021    Procedure: IMAGING PROCEDURE, GI TRACT, INTRALUMINAL, VIA CAPSULE;  Surgeon: Tahir Geronimo RN;  Location: [facility] ENDO;  Service: Endoscopy;  Laterality: N/A;    PROSTATE SURGERY      prostate radiation    RADIOFREQUENCY THERMOCOAGULATION Left 6/4/2021    Procedure: Left L3-5 Lumbar RFA;  Surgeon: Tacho Trivedi MD;  Location: [facility] PAIN MGT;  Service: Pain Management;   Laterality: Left;    RADIOFREQUENCY THERMOCOAGULATION Right 6/18/2021    Procedure: Right L3-5 Lumbar RFA;  Surgeon: Tacho Trivedi MD;  Location: [facility] PAIN MGT;  Service: Pain Management;  Laterality: Right;    REPLACEMENT OF PACEMAKER GENERATOR Left 6/16/2022    Procedure: REPLACEMENT, PULSE GENERATOR, CARDIAC PACEMAKER/CRT-P device;  Surgeon: Darrel Carrero MD;  Location: [facility] CATH LAB;  Service: Cardiology;  Laterality: Left;  NOT MRI safe   Pacer and leads implanted 9/30/2017, Dr. Souleymane CARROLLT Consulta CRT-P C4TR01, [id number]   A lead: Mdt 5076 CapSureFix® Novus, [id number]   RV lead: Mdt 5076 CapSureFix® Novus, [id number]   LV lead: Jovan 4196 At    SELECTIVE INJECTION OF ANESTHETIC AGENT AROUND LUMBAR SPINAL NERVE ROOT BY TRANSFORAMINAL APPROACH Bilateral 6/8/2022    Procedure: Bilateral L3/4 TF XUAN with RN IV sedation;  Surgeon: Philipp Demarco MD;  Location: [facility] PAIN MGT;  Service: Pain Management;  Laterality: Bilateral;    SPINE SURGERY      fusion    TONSILLECTOMY      TRIAL OF SPINAL CORD NERVE STIMULATOR N/A 1/25/2023    Procedure: Trial, Neurostimulator, Spinal Cord with RN IV sedation;  Surgeon: Philipp Demarco MD;  Location: [facility] PAIN MGT;  Service: Pain Management;  Laterality: N/A;       Time Tracking:     OT Date of Treatment: 05/12/23  OT Start Time: 1015  OT Stop Time: 1045  OT Total Time (min): 30 min    Billable Minutes:Evaluation 15  Therapeutic Activity 15    5/12/2023  Penny Valladares OT

## 2023-05-12 NOTE — ASSESSMENT & PLAN NOTE
Patient is identified as having Combined Systolic and Diastolic heart failure that is Acute on chronic. CHF is currently controlled and uncontrolled due to Continued edema of extremities. Latest ECHO performed and demonstrates- Results for orders placed during the hospital encounter of 04/28/22    Echo    Interpretation Summary  · The left ventricle is normal in size with concentric hypertrophy and moderately decreased systolic function.  · The estimated ejection fraction is 35%.  · Grade III left ventricular diastolic dysfunction.  · Mild right ventricular enlargement with moderately reduced right ventricular systolic function.  · Mild left atrial enlargement.  · Mild right atrial enlargement.  · There is mild aortic valve stenosis.  · Aortic valve area is 1.53 cm2; peak velocity is 1.78 m/s; mean gradient is 7 mmHg.  · Mild mitral regurgitation.  · Mild tricuspid regurgitation.  · Elevated central venous pressure (15 mmHg).  · The estimated PA systolic pressure is 37 mmHg.  · There is abnormal septal wall motion. There is systolic and diastolic flattening of the interventricular septum consistent with right ventricle pressure and volume overload.  · There is moderate left ventricular global hypokinesis.  . Continue Beta Blocker, ACE/ARB, Furosemide and Aldactone and monitor clinical status closely. Monitor on telemetry. Patient is off CHF pathway.  Monitor strict Is&Os and daily weights.  Place on fluid restriction of 1.5 L. Continue to stress to patient importance of self efficacy and  on diet for CHF. Last BNP reviewed- and noted below   Recent Labs   Lab 05/12/23  0632   *     TTE with DD and LVEF 50%  Telemetry monitoring  Holding diuretics due to acute on CKD  Strict I/O's, Na/fluid restriction  Cardiology following

## 2023-05-12 NOTE — ASSESSMENT & PLAN NOTE
Patient presented with worsening bilateral lower extremity weakness.  CT lumbar spine and hips/pelvis showed degenerative disease.  Patient without evidence of bowel/bladder incontinence, lumbar back pain, hip pain, and has 5/5 strength throughout with the exception of 4/5 strength upon left leg raise.  Sensation to light touch grossly intact throughout.  Patient remains afebrile without leukocytosis.  UA negative for UTI.  Patient does have evidence of slight elevated anion gap metabolic acidosis with AG measuring 18 and bicarb 17. Ortho/spine consulted who recommended metabolic workup, PT/OT, and conservative therapies and patient will be evaluated in the morning.  Plan:  -Continue current pain regimen, titrate as needed  -Bowel regimen  -Bedrest  -IVFs prn  -Antiemetics prn  -Tylenol as needed for fever   -f/u ortho/spine  -f/u cultures  -PT/OT    5/11  -MRI lumbar spine currently pending    5/12  -Unable to obtain MRI due to incompatible PM  -PT/OT following, able to ambulate some, SNF recommended

## 2023-05-12 NOTE — PLAN OF CARE
05/12/23 1340   Post-Acute Status   Post-Acute Authorization Placement   Post-Acute Placement Status Referrals Sent   Coverage people's health   Discharge Plan   Discharge Plan B Skilled Nursing Facility     Pending accepting facilities.

## 2023-05-12 NOTE — PT/OT/SLP EVAL
Physical Therapy Evaluation    Patient Name:  Jake Ortiz   MRN:  [id number]    Recommendations:     Discharge Recommendations: nursing facility, skilled   Discharge Equipment Recommendations: walker, rolling   Barriers to discharge: Decreased caregiver support    Assessment:     Jake Ortiz is a 82 y.o. male admitted with a medical diagnosis of Acute renal failure superimposed on stage 3 chronic kidney disease.  He presents with the following impairments/functional limitations: weakness, impaired endurance, impaired functional mobility, gait instability, impaired balance, pain, decreased lower extremity function, decreased ROM, impaired self care skills .    Rehab Prognosis: Good; patient would benefit from acute skilled PT services to address these deficits and reach maximum level of function.    Recent Surgery: * No surgery found *      Plan:     During this hospitalization, patient to be seen 3 x/week to address the identified rehab impairments via gait training, therapeutic exercises, therapeutic activities and progress toward the following goals:    Plan of Care Expires:  05/26/23    Subjective     Chief Complaint: PAIN L KNEE   Patient/Family Comments/goals: INC MOBILITY   Pain/Comfort:  Pain Rating 1: 3/10  Location - Side 1: Left  Location 1: knee  Pain Rating Post-Intervention 1: 3/10    Patients cultural, spiritual, Restorationist conflicts given the current situation:      Living Environment:  PT LIVES AT HOME WITH WIFE IN A ONE STORY HOME WITH NO STEPS TO ENTER HOME   Prior to admission, patients level of function was MOD I WITH ROLLATOR HOWEVER RECENTLY FELL.  Equipment used at home: rollator.  DME owned (not currently used): none.  Upon discharge, patient will have assistance from UNKNOWN .    Objective:     Communicated with NURSE MCCORMICK AND Epic CHART REVIEW  prior to session.  Patient found supine with peripheral IV, telemetry  upon PT entry to room.    General Precautions: Standard,  "fall  Orthopedic Precautions:N/A   Braces: N/A  Respiratory Status: Room air    Exams:  RLE ROM: LIMITED  RLE Strength: LIMITED  LLE ROM: LIMITED  LLE Strength: LIMITED    Functional Mobility:  Bed Mobility:     Supine to Sit: PT REPORTED IND  Transfers:     Sit to Stand:  minimum assistance with rolling walker  Bed to Chair: minimum assistance with  rolling walker  using  Stand Pivot  Gait: PT GT TRAINED X 40' WITH RW AND MIN A WITH DEC FOOT CLEARANCE OF L LE.       AM-PAC 6 CLICK MOBILITY  Total Score:15       Treatment & Education:  PT STUBBED TOES WITH GT ON L LE MULT TIMES INC FALL RISK. PT GIVEN CUES TO INC SWING OF GT. PT RETURNED TO RM T/F TO CHAIR AND PT EDUCATED ON RISK FOR FALLS DUE TO GENERALIZED WEAKNESS, EDUCATED ON "CALL DON'T FALL", ENCOURAGED TO CALL FOR ASSISTANCE WITH ALL NEEDS SUCH AS BED<>CHAIR TRANSFERS OR TRIPS TO BATHROOM. PT REPORTED UNDERSTANDING. PT EDUCATED ON TE AP, TKE, AND MIP PT COMPLETED PROPERLY.     Patient left up in chair with call button in reach.    GOALS:   Multidisciplinary Problems       Physical Therapy Goals          Problem: Physical Therapy    Goal Priority Disciplines Outcome Goal Variances Interventions   Physical Therapy Goal     PT, PT/OT      Description: LT23  1. PT WILL COMPLETE BED MOBILITY IND  2. PT WILL T/F TO CHAIR WITH RW AND SBA  3. 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    #4 PROSTATE BIOPSY, LEFT APEX: ADENOCARCINOMA, FARHAD GRADE 3 + 3 = 6, IN 1 of 2 CORES 2014    Anemia     Arthritis     Atrial fibrillation 10/19/2017    Back pain     Congestive heart failure 2018    Coronary artery disease     DM (diabetes mellitus)      am 2020    DM (diabetes mellitus) 2016     am 2021    Hyperlipemia     Hypertension     Myocardial infarction     NASREEN (obstructive sleep apnea) 2018    " Prostate cancer 2015    Tobacco dependence     Type 2 diabetes mellitus        Past Surgical History:   Procedure Laterality Date    COLONOSCOPY N/A 9/22/2020    Procedure: COLONOSCOPY;  Surgeon: Javier Farmer MD;  Location: [facility] ENDO;  Service: Endoscopy;  Laterality: N/A;    CORONARY ARTERY BYPASS GRAFT  1987    EPIDURAL STEROID INJECTION N/A 11/22/2019    Procedure: Lumbar L5/S1 IL XUAN;  Surgeon: Tacho Trivedi MD;  Location: [facility];  Service: Pain Management;  Laterality: N/A;    EPIDURAL STEROID INJECTION N/A 1/6/2020    Procedure: Lumbar L5/S1 IL XUAN;  Surgeon: Tacho Trivedi MD;  Location: [facility];  Service: Pain Management;  Laterality: N/A;    EPIDURAL STEROID INJECTION N/A 2/10/2020    Procedure: Caudal XUAN;  Surgeon: Tacho Trivedi MD;  Location: [facility];  Service: Pain Management;  Laterality: N/A;    ESOPHAGOGASTRODUODENOSCOPY N/A 9/22/2020    Procedure: EGD (ESOPHAGOGASTRODUODENOSCOPY);  Surgeon: Javier Farmer MD;  Location: [facility] ENDO;  Service: Endoscopy;  Laterality: N/A;    INJECTION OF ANESTHETIC AGENT AROUND MEDIAL BRANCH NERVES INNERVATING LUMBAR FACET JOINT Bilateral 10/12/2020    Procedure: Bilateral L3-5 MBB;  Surgeon: Tacho Trivedi MD;  Location: [facility];  Service: Pain Management;  Laterality: Bilateral;    INJECTION OF ANESTHETIC AGENT AROUND MEDIAL BRANCH NERVES INNERVATING LUMBAR FACET JOINT Bilateral 12/21/2020    Procedure: Bilateral L3-5 MBB with steroid;  Surgeon: Tacho Trivedi MD;  [facility];  Service: Pain Management;  Laterality: Bilateral;    INSERTION OF SPINAL NEUROSTIMULATOR N/A 3/23/2023    Procedure: INSERTION, NEUROSTIMULATOR, SPINAL;  Surgeon: Philipp Demarco MD;  Location: [facility] OR;  Service: Pain Management;  Laterality: N/A;    INTRALUMINAL GASTROINTESTINAL TRACT IMAGING VIA CAPSULE N/A 12/29/2021    Procedure: IMAGING PROCEDURE, GI TRACT, INTRALUMINAL, VIA CAPSULE;  Surgeon: Tahir Geronimo RN;   Location: [facility] ENDO;  Service: Endoscopy;  Laterality: N/A;    PROSTATE SURGERY      prostate radiation    RADIOFREQUENCY THERMOCOAGULATION Left 6/4/2021    Procedure: Left L3-5 Lumbar RFA;  Surgeon: Tacho Trivedi MD;  Location: [facility] PAIN MGT;  Service: Pain Management;  Laterality: Left;    RADIOFREQUENCY THERMOCOAGULATION Right 6/18/2021    Procedure: Right L3-5 Lumbar RFA;  Surgeon: Tacho Trivedi MD;  Location: [facility] PAIN MGT;  Service: Pain Management;  Laterality: Right;    REPLACEMENT OF PACEMAKER GENERATOR Left 6/16/2022    Procedure: REPLACEMENT, PULSE GENERATOR, CARDIAC PACEMAKER/CRT-P device;  Surgeon: Darrle Carrero MD;  Location: [facility] CATH LAB;  Service: Cardiology;  Laterality: Left;  NOT MRI safe   Pacer and leads implanted 9/30/2017, Dr. Souleymane SANTACRUZ Consulta CRT-P C4TR01, [id number]   A lead: Mdt 5076 CapSureFix® Novus, [id number]   RV lead: Mdt 5076 CapSureFix® Novus, [id number]   LV lead: Mdt 4196 At    SELECTIVE INJECTION OF ANESTHETIC AGENT AROUND LUMBAR SPINAL NERVE ROOT BY TRANSFORAMINAL APPROACH Bilateral 6/8/2022    Procedure: Bilateral L3/4 TF XUAN with RN IV sedation;  Surgeon: Philipp Demarco MD;  Location: [facility] PAIN MGT;  Service: Pain Management;  Laterality: Bilateral;    SPINE SURGERY      fusion    TONSILLECTOMY      TRIAL OF SPINAL CORD NERVE STIMULATOR N/A 1/25/2023    Procedure: Trial, Neurostimulator, Spinal Cord with RN IV sedation;  Surgeon: Philipp Demarco MD;  Location: [facility] PAIN MGT;  Service: Pain Management;  Laterality: N/A;       Time Tracking:     PT Received On: 05/12/23  PT Start Time: 1020     PT Stop Time: 1045  PT Total Time (min): 25 min     Billable Minutes: Evaluation 15 and Therapeutic Exercise 10      05/12/2023

## 2023-05-12 NOTE — CONSULTS
[facility] Surg  Cardiology  Consult Note    Patient Name: Jake Ortiz  MRN: [id number]  Admission Date: 5/10/2023  Hospital Length of Stay: 1 days  Code Status: Full Code   Attending Provider: Bobby Garcia MD   Consulting Provider: Anuradha Richardson PA-C  Primary Care Physician: Byron Stevens MD  Principal Problem:Acute renal failure superimposed on stage 3 chronic kidney disease    Patient information was obtained from patient, spouse/SO, past medical records and ER records.     Inpatient consult to Cardiology  Consult performed by: Anuradha Richardson PA-C  Consult ordered by: Bobby Garcia MD        Subjective:     Chief Complaint:  Weakness    HPI:   Mr. Ortiz is an 82 year old male patient whose current medical conditions include abnormal PFT, adenocarcinoma of prostate, afib (on Eliquis), combined CHF s/p CRT-D, CAD s/p CABG, CKD stage III, and DM type II who presented to [facility] ED on 5/10/23 with a chief complaint of worsening BLE weakness x 2 days. Wife reported patient had recently been having issues with his spinal stimulator. He followed up with his pain management MD and was given a new control for the device but shortly thereafter began having increased lower extremity weakness. He suffered a fall on Wednesday (5/10) while trying to clean up ice off the floor and was too weak to get up, prompting his wife to bring him to ED. Associated symptoms included fatigue, diarrhea, and left knee soreness from his fall. Initial workup in ED revealed VAIBHAV (creatinine 3.5), anemia, and troponin of 0.250 and patient was subsequently admitted for further evaluation and treatment. Cardiology consulted to assist with management. Patient seen and examined today, sitting in bed. Still very weak in his BLE, L > R, unable to ambulate. Denies any numbness or tingling. No associated slurred speech, blurred vision, or UE weakness. No chest pain or SOB. Patient reports compliance with his medications, on Eliquis as OP.  Followed routinely in clinic by Dr. Hood. Troponin elevated but trending down 0.250>0.239. CK > 200. Echo this admission showed EF of 50%, mild-mod MR. BNP on low side and stable.           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    #4 PROSTATE BIOPSY, LEFT APEX: ADENOCARCINOMA, FARHAD GRADE 3 + 3 = 6, IN 1 of 2 CORES 9/8/2014    Anemia     Arthritis     Atrial fibrillation 10/19/2017    Back pain     Congestive heart failure 03/05/2018    Coronary artery disease     DM (diabetes mellitus) 2018     am 06/23/2020    DM (diabetes mellitus) 2016     am 08/17/2021    Hyperlipemia     Hypertension     Myocardial infarction     NASREEN (obstructive sleep apnea) 06/05/2018    Prostate cancer 2015    Tobacco dependence     Type 2 diabetes mellitus        Past Surgical History:   Procedure Laterality Date    COLONOSCOPY N/A 9/22/2020    Procedure: COLONOSCOPY;  Surgeon: Javier Farmer MD;  Location: [facility];  Service: Endoscopy;  Laterality: N/A;    CORONARY ARTERY BYPASS GRAFT  1987    EPIDURAL STEROID INJECTION N/A 11/22/2019    Procedure: Lumbar L5/S1 IL XUAN;  Surgeon: Tacho Trivedi MD;  Location: [facility] PAIN MGT;  Service: Pain Management;  Laterality: N/A;    EPIDURAL STEROID INJECTION N/A 1/6/2020    Procedure: Lumbar L5/S1 IL XUAN;  Surgeon: Tacho Trivedi MD;  Location: [facility] PAIN MGT;  Service: Pain Management;  Laterality: N/A;    EPIDURAL STEROID INJECTION N/A 2/10/2020    Procedure: Caudal XUAN;  Surgeon: Tacho Trivedi MD;  Location: [facility] PAIN MGT;  Service: Pain Management;  Laterality: N/A;    ESOPHAGOGASTRODUODENOSCOPY N/A 9/22/2020    Procedure: EGD (ESOPHAGOGASTRODUODENOSCOPY);  Surgeon: Javier Farmer MD;  Location: [facility];  Service: Endoscopy;  Laterality: N/A;    INJECTION OF ANESTHETIC AGENT AROUND MEDIAL BRANCH NERVES INNERVATING LUMBAR FACET JOINT Bilateral 10/12/2020     Procedure: Bilateral L3-5 MBB;  Surgeon: Tacho Trivedi MD;  Location: [facility] PAIN MGT;  Service: Pain Management;  Laterality: Bilateral;    INJECTION OF ANESTHETIC AGENT AROUND MEDIAL BRANCH NERVES INNERVATING LUMBAR FACET JOINT Bilateral 12/21/2020    Procedure: Bilateral L3-5 MBB with steroid;  Surgeon: Tacho Trivedi MD;  Location: [facility] PAIN MGT;  Service: Pain Management;  Laterality: Bilateral;    INSERTION OF SPINAL NEUROSTIMULATOR N/A 3/23/2023    Procedure: INSERTION, NEUROSTIMULATOR, SPINAL;  Surgeon: Philipp Demarco MD;  Location: [facility] OR;  Service: Pain Management;  Laterality: N/A;    INTRALUMINAL GASTROINTESTINAL TRACT IMAGING VIA CAPSULE N/A 12/29/2021    Procedure: IMAGING PROCEDURE, GI TRACT, INTRALUMINAL, VIA CAPSULE;  Surgeon: Tahir Geronimo RN;  Location: [facility] ENDO;  Service: Endoscopy;  Laterality: N/A;    PROSTATE SURGERY      prostate radiation    RADIOFREQUENCY THERMOCOAGULATION Left 6/4/2021    Procedure: Left L3-5 Lumbar RFA;  Surgeon: Tacho Trivedi MD;  Location: [facility] PAIN MGT;  Service: Pain Management;  Laterality: Left;    RADIOFREQUENCY THERMOCOAGULATION Right 6/18/2021    Procedure: Right L3-5 Lumbar RFA;  Surgeon: Tacho Trivedi MD;  Location: [facility] PAIN MGT;  Service: Pain Management;  Laterality: Right;    REPLACEMENT OF PACEMAKER GENERATOR Left 6/16/2022    Procedure: REPLACEMENT, PULSE GENERATOR, CARDIAC PACEMAKER/CRT-P device;  Surgeon: Darrel Carrero MD;  Location: [facility] CATH LAB;  Service: Cardiology;  Laterality: Left;  NOT MRI safe   Pacer and leads implanted 9/30/2017, Dr. Souleymane SANTACRUZ Consulta CRT-P C4TR01, [id number]   A lead: Mdt 5076 CapSureFix® Novus, [id number]   RV lead: Mdt 5076 CapSureFix® Novus, [id number]   LV lead: Jovan 4196 At    SELECTIVE INJECTION OF ANESTHETIC AGENT AROUND LUMBAR SPINAL NERVE ROOT BY TRANSFORAMINAL APPROACH Bilateral 6/8/2022    Procedure: Bilateral L3/4 TF XUAN with RN IV sedation;  Surgeon: Philipp Demarco MD;  Location:  [facility] PAIN MGT;  Service: Pain Management;  Laterality: Bilateral;    SPINE SURGERY      fusion    TONSILLECTOMY      TRIAL OF SPINAL CORD NERVE STIMULATOR N/A 1/25/2023    Procedure: Trial, Neurostimulator, Spinal Cord with RN IV sedation;  Surgeon: Philipp Demarco MD;  Location: [facility] PAIN MGT;  Service: Pain Management;  Laterality: N/A;       Review of patient's allergies indicates:  No Known Allergies    Current Facility-Administered Medications on File Prior to Encounter   Medication    ondansetron injection 4 mg     Current Outpatient Medications on File Prior to Encounter   Medication Sig    acetaminophen (TYLENOL) 500 MG tablet Take 2 tablets (1,000 mg total) by mouth every 6 (six) hours as needed for Pain.    apixaban (ELIQUIS) 2.5 mg Tab Take 1 tablet (2.5 mg total) by mouth 2 (two) times daily.    atorvastatin (LIPITOR) 80 MG tablet Take 1 tablet (80 mg total) by mouth every evening.    cefadroxil (DURICEF) 500 MG Cap Take 1 capsule (500 mg total) by mouth every 12 (twelve) hours.    cholecalciferol, vitamin D3, (VITAMIN D3) 25 mcg (1,000 unit) capsule Take 1,000 Units by mouth once daily.    furosemide (LASIX) 40 MG tablet Take 1 tablet (40 mg total) by mouth 2 (two) times daily. Can double to 2 tablets twice a day for 3 days for more swelling/fluid build up - take 80mg twice a day    krill/om-3/dha/epa/phospho/ast (MEGARED OMEGA-3 KRILL OIL ORAL) Take 1 capsule by mouth every morning.    levocetirizine (XYZAL) 5 MG tablet Take 1 tablet (5 mg total) by mouth every evening.    losartan (COZAAR) 50 MG tablet Take 1 tablet (50 mg total) by mouth once daily.    multivitamin capsule Take 1 capsule by mouth once daily. Takes once a week    pantoprazole (PROTONIX) 40 MG tablet Take 1 tablet (40 mg total) by mouth once daily.    potassium chloride SA (K-DUR,KLOR-CON) 20 MEQ tablet Take 1 tablet (20 mEq total) by mouth once daily.    pregabalin (LYRICA) 75 MG capsule Take 1 capsule QHS x 1 week,  then increase to BID (if tolerated).    pyridoxine, vitamin B6, (B-6) 100 MG Tab Take 50 mg by mouth once daily.    spironolactone (ALDACTONE) 25 MG tablet Take 1 tablet (25 mg total) by mouth once daily.    vit A/vit C/vit E/zinc/copper (OCUVITE PRESERVISION ORAL) Take 1 capsule by mouth every evening.    blood sugar diagnostic Strp Check blood glucose levels daily in the AM fasting and 1-2 times more daily.    clonazePAM (KLONOPIN) 1 MG tablet Take 1 tablet (1 mg total) by mouth nightly as needed for Anxiety.    fluticasone propionate (FLONASE) 50 mcg/actuation nasal spray 2 sprays (100 mcg total) by Each Nostril route once daily.    lancets Misc Check blood glucose levels daily in the AM fasting and 1-2 times more daily.     Family History       Problem Relation (Age of Onset)    Cataracts Mother    Diabetes Mother    Heart attack Mother    Heart disease Mother, Father, Brother    Stroke Father          Tobacco Use    Smoking status: Former     Packs/day: 1.50     Years: 20.00     Pack years: 30.00     Types: Cigarettes     Start date:      Quit date:      Years since quittin.3    Smokeless tobacco: Never   Substance and Sexual Activity    Alcohol use: No    Drug use: No    Sexual activity: Not Currently     Review of Systems   Constitutional: Positive for malaise/fatigue.   HENT: Negative.     Eyes: Negative.    Cardiovascular: Negative.    Respiratory: Negative.     Endocrine: Negative.    Skin: Negative.    Musculoskeletal:  Positive for joint pain.   Gastrointestinal: Negative.    Genitourinary: Negative.    Neurological:         BLE weakness     Psychiatric/Behavioral: Negative.     Allergic/Immunologic: Negative.    Objective:     Vital Signs (Most Recent):  Temp: 98.7 °F (37.1 °C) (23)  Pulse: 77 (23)  Resp: 16 (2316)  BP: 138/65 (23)  SpO2: 95 % (23) Vital Signs (24h Range):  Temp:  [97.2 °F (36.2 °C)-100.1 °F (37.8 °C)] 98.7 °F  (37.1 °C)  Pulse:  [61-77] 77  Resp:  [16-20] 16  SpO2:  [94 %-98 %] 95 %  BP: (107-144)/(55-65) 138/65     Weight: 97.9 kg (215 lb 13.3 oz)  Body mass index is 32.82 kg/m².    SpO2: 95 %         Intake/Output Summary (Last 24 hours) at 5/12/2023 1202  Last data filed at 5/12/2023 0643  Gross per 24 hour   Intake 395.41 ml   Output --   Net 395.41 ml       Lines/Drains/Airways       Peripheral Intravenous Line  Duration                  Peripheral IV - Single Lumen 05/11/23 1500 22 G Anterior;Right Forearm <1 day                     Physical Exam  Vitals and nursing note reviewed.   Constitutional:       General: He is not in acute distress.     Appearance: Normal appearance. He is well-developed. He is ill-appearing. He is not diaphoretic.   HENT:      Head: Normocephalic and atraumatic.   Eyes:      General:         Right eye: No discharge.         Left eye: No discharge.      Pupils: Pupils are equal, round, and reactive to light.   Neck:      Thyroid: No thyromegaly.      Vascular: No JVD.      Trachea: No tracheal deviation.   Cardiovascular:      Rate and Rhythm: Normal rate and regular rhythm.      Chest Wall: PMI is not displaced.      Pulses: Intact distal pulses.      Heart sounds: Normal heart sounds. No murmur heard.    No friction rub. No gallop. No S3 or S4 sounds.      Comments: CRT-D site well-healed  Pulmonary:      Effort: Pulmonary effort is normal. No respiratory distress.      Breath sounds: Normal breath sounds. No wheezing or rales.   Abdominal:      General: There is no distension.      Tenderness: There is no rebound.   Musculoskeletal:      Cervical back: Neck supple.      Right lower leg: No edema.      Left lower leg: No edema.   Skin:     General: Skin is warm and dry.      Findings: No erythema.   Neurological:      Mental Status: He is alert and oriented to person, place, and time.      Comments: BLE weakness, L > R   Psychiatric:         Mood and Affect: Mood normal.         Behavior:  Behavior normal.         Thought Content: Thought content normal.        Significant Labs: CMP   Recent Labs   Lab 05/10/23  1732 05/11/23  0634 05/12/23  0634    136 138   K 3.8 4.0 4.2    104 105   CO2 17* 18* 19*   * 151* 138*   BUN 91* 102* 99*   CREATININE 3.5* 3.5* 2.9*   CALCIUM 8.5* 8.5* 8.8   PROT 7.2 6.6 6.7   ALBUMIN 3.2* 2.9* 2.9*   BILITOT 0.3 0.4 0.4   ALKPHOS 69 63 63   AST 34 28 33   ALT 20 19 25   ANIONGAP 18* 14 14   , CBC   Recent Labs   Lab 05/10/23  1732 05/11/23  0634 05/12/23  0634   WBC 10.29 10.08 8.60   HGB 7.8* 7.2* 8.0*   HCT 25.3* 23.3* 25.4*    177 170   , Troponin   Recent Labs   Lab 05/10/23  1732 05/10/23  2039   TROPONINI 0.250* 0.239*   , and All pertinent lab results from the last 24 hours have been reviewed.    Significant Imaging: Echocardiogram: Transthoracic echo (TTE) complete ([facility]upid Only):   Results for orders placed or performed during the hospital encounter of 05/10/23   Echo   Result Value Ref Range    BSA 2.14 m2    LA WIDTH 4.20 cm    IVC diameter 2.87 cm    Left Ventricular Outflow Tract Mean Velocity 0.56 cm/s    Left Ventricular Outflow Tract Mean Gradient 1.55 mmHg    LVIDd 6.51 (A) 3.5 - 6.0 cm    IVS 0.96 0.6 - 1.1 cm    Posterior Wall 0.85 0.6 - 1.1 cm    Ao root annulus 3.83 cm    LVIDs 4.59 (A) 2.1 - 4.0 cm    FS 29 28 - 44 %    LA volume 132.20 cm3    STJ 3.46 cm    Ascending aorta 3.17 cm    LV mass 250.23 g    LA size 5.31 cm    RVDD 4.24 cm    TAPSE 1.90 cm    Left Ventricle Relative Wall Thickness 0.26 cm    AV mean gradient 6 mmHg    AV valve area 1.83 cm2    AV Velocity Ratio 0.58     AV index (prosthetic) 0.57     MV valve area p 1/2 method 2.75 cm2    E/A ratio 3.60     E wave deceleration time 276.19 msec    IVRT 51.38 msec    LVOT diameter 2.02 cm    LVOT area 3.2 cm2    LVOT peak jacqui 0.91 m/s    LVOT peak VTI 16.70 cm    Ao peak jacqui 1.57 m/s    Ao VTI 29.3 cm    RVOT peak jacqui 0.77 m/s    RVOT peak VTI 14.5 cm    Mr max jacqui  4.78 m/s    LVOT stroke volume 53.49 cm3    AV peak gradient 10 mmHg    PV mean gradient 1.03 mmHg    MV Peak E Wilian 1.08 m/s    TR Max Wilian 3.08 m/s    MV stenosis pressure 1/2 time 80.09 ms    MV Peak A Wilian 0.30 m/s    LV Systolic Volume 96.76 mL    LV Systolic Volume Index 46.3 mL/m2    LV Diastolic Volume 216.76 mL    LV Diastolic Volume Index 103.71 mL/m2    LA Volume Index 63.3 mL/m2    LV Mass Index 120 g/m2    RA Major Axis 7.76 cm    Left Atrium Minor Axis 6.88 cm    Left Atrium Major Axis 7.07 cm    Triscuspid Valve Regurgitation Peak Gradient 38 mmHg    EF 50 %    Narrative    · Eccentric hypertrophy and low normal systolic function.  · Moderate left atrial enlargement.  · The estimated ejection fraction is 50%.  · Indeterminate left ventricular diastolic function.  · There is abnormal septal wall motion consistent with left bundle branch   block.  · Normal right ventricular size with normal right ventricular systolic   function.  · Mild-to-moderate mitral regurgitation.  · Mild to moderate tricuspid regurgitation.      , EKG: Reviewed, and X-Ray: CXR: X-Ray Chest 1 View (CXR): No results found for this visit on 05/10/23. and X-Ray Chest PA and Lateral (CXR): No results found for this visit on 05/10/23.    Assessment and Plan:   Patient who presents with BLE weakness. Needs CVA ruled out. Elevated troponin likely due to demand ischemia. Volume status stable, resume po Lasix soon. Avoid ARB and Aldactone for now. Continue Eliquis as tolerated, FOBT +----> mgmt as per primary team.    Weakness of both lower extremities  -Unclear etiology, needs CVA ruled out (CT of head with NAF)  -? Related to back issues/pain stimulator   -May  Have mild element of rhabdo-    Symptomatic anemia  -Transfused since admission  -FOBT+  -Mgmt as per primary team  -Continue Eliquis as tolerated    Chronic combined systolic and diastolic congestive heart failure  -Echo this admission showed EF of 50%  -Came in dehydrated,  secondary to diarrhea/over-diuresis  -Can resume po Lasix soon  -Hold ARB and Aldactone for now  -Monitor volume status closely      S/P CABG x 3  -Stable  -Continue OMT    CAD (coronary artery disease)  -Stable, no angina  -Continue OMT    Hypertension  -Resume home meds as tolerated-would avoid ARB and Aldactone for now    Elevated troponin  -Troponin trending down, likely due to demand ischemia from VAIBHAV/hypotension/anemia  -No anginal symptoms  -Not on ASA given anemia issues  -Continue OMT as tolerated  -Can consider OP stress test    VTE Risk Mitigation (From admission, onward)         Ordered     apixaban tablet 2.5 mg  2 times daily         05/10/23 2336     Reason for No Pharmacological VTE Prophylaxis  Once        Question:  Reasons:  Answer:  Already adequately anticoagulated on oral Anticoagulants    05/10/23 2324     IP VTE HIGH RISK PATIENT  Once         05/10/23 2324     Place sequential compression device  Until discontinued         05/10/23 2324                Thank you for your consult. I will follow-up with patient. Please contact us if you have any additional questions.    Anuradha Richardson PA-C  Cardiology   [facility]

## 2023-05-12 NOTE — PROGRESS NOTES
[facility] Medicine  Progress Note    Patient Name: Jake Ortiz  MRN: [id number]  Patient Class: OP- Observation   Admission Date: 5/10/2023  Length of Stay: 1 days  Attending Physician: Bobby Garcia MD  Primary Care Provider: Byron Stevens MD        Subjective:     Principal Problem:Acute renal failure superimposed on stage 3 chronic kidney disease        HPI:  Jake Ortiz is a 82 y.o. 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 who presented to the ED for worsening bilateral lower extremity weakness x2 days duration.  Patient reported upon waking earlier this morning, his legs gave out causing him to fall on his left knee has been unable to walk/stand since then.  Patient denied endorsing any head trauma, loss of consciousness, or sustaining any other injuries in his only complaining of left knee pain described as hurting/sore in nature, constant, currently rated 5/10 in severity with aggravating factors including weight-bearing, movement, and palpation to the affected area.  He denied endorsing any lower back pain, hip pain, numbness/tingling of his lower extremities, bowel/bladder incontinence, and reported all other review of systems negative except as noted above.  Of note, patient did have his nerve stimulator manipulated yesterday and reported being in his usual state of health prior to onset of symptoms.  Initial workup in the ED revealed CT lumbar spine and hip positive for degenerative disease but negative for evidence of fractures or dislocations.  Ortho/spine was  consulted by ED staff rolling further imaging via MRI with recommendations to admit to hospital medicine for continued medical management and treatment/workup of metabolic causes as well as PT/OT evaluation inpatient will be evaluated in the morning.      PCP: Byron Stevens        Overview/Hospital Course:  5/11/23 NAEON. Presented yesterday for weakness Wife at bedside, provides history.  Patient reportedly fell onto his left knee, c/o left knee pain, +ROM  Has been having trouble to stand and ambulate, fell several times per wife    Wife reports abdominal distended, BLE edema; recently has Lasix dose increased but not improvement  Patient with CHF, noted elevated BNP. Check TTE, Lasix IV, strict I/O's  H/H also trending down, known chronic anemia  Transfuse 1 unit PRBC today for symptmoatic anemia  Imodium given for diarrhea this AM, no further episodes since    5/12/23 NAEON. Patient reports fatigue, left knee pain, swelling; CT ordered  Cr 3.5 --> 2.9, received course of IV fluids overnight  H/H 8/25.4 after 1 unit PRBC yesterday  Fecal occult+, patient does reports dark, tarry stools        Review of Systems   All other systems reviewed and are negative.  Objective:     Vital Signs (Most Recent):  Temp: 99.9 °F (37.7 °C) (05/12/23 1616)  Pulse: 60 (05/12/23 1616)  Resp: 18 (05/12/23 1616)  BP: (!) 121/57 (05/12/23 1616)  SpO2: 96 % (05/12/23 1616) Vital Signs (24h Range):  Temp:  [98.4 °F (36.9 °C)-100.1 °F (37.8 °C)] 99.9 °F (37.7 °C)  Pulse:  [60-77] 60  Resp:  [16-20] 18  SpO2:  [94 %-98 %] 96 %  BP: (107-138)/(55-65) 121/57     Weight: 97.9 kg (215 lb 13.3 oz)  Body mass index is 32.82 kg/m².    Intake/Output Summary (Last 24 hours) at 5/12/2023 171  Last data filed at 5/12/2023 0643  Gross per 24 hour   Intake 275.41 ml   Output --   Net 275.41 ml         Physical Exam  Constitutional:       General: He is not in acute distress.     Appearance: Normal appearance. He is obese. He is ill-appearing.    Cardiovascular:      Rate and Rhythm: Normal rate and regular rhythm.      Heart sounds: No murmur heard.  Pulmonary:      Effort: Pulmonary effort is normal. No respiratory distress.      Breath sounds: Normal breath sounds. No wheezing.   Abdominal:      General: There is distension.      Palpations: Abdomen is soft.      Tenderness: There is no abdominal tenderness.   Skin:     Coloration: Skin is pale.   Neurological:      Mental Status: He is alert.           Significant Labs: All pertinent labs within the past 24 hours have been reviewed.  CBC:   Recent Labs   Lab 05/10/23  1732 05/11/23  0634 05/12/23  0634   WBC 10.29 10.08 8.60   HGB 7.8* 7.2* 8.0*   HCT 25.3* 23.3* 25.4*    177 170     CMP:   Recent Labs   Lab 05/10/23  1732 05/11/23  0634 05/12/23  0634    136 138   K 3.8 4.0 4.2    104 105   CO2 17* 18* 19*   * 151* 138*   BUN 91* 102* 99*   CREATININE 3.5* 3.5* 2.9*   CALCIUM 8.5* 8.5* 8.8   PROT 7.2 6.6 6.7   ALBUMIN 3.2* 2.9* 2.9*   BILITOT 0.3 0.4 0.4   ALKPHOS 69 63 63   AST 34 28 33   ALT 20 19 25   ANIONGAP 18* 14 14       Significant Imaging: I have reviewed all pertinent imaging results/findings within the past 24 hours.      Assessment/Plan:      * Acute renal failure superimposed on stage 3 chronic kidney disease  Patient found to have VAIBHAV superimposed on underlying CKD stage 3 with creatinine/GFR currently measuring 3.5/17 with last reported baseline measuring 1.9/40.  Patient is noted to be on multiple antihypertensive medications known to be nephrotoxic.  Plan:  -Avoid nephrotoxins (holding losartan and spironolactone)  -Monitor renal function  -Renally dose medications  -IVFs prn    5/11  -UA with trace protein  -Patient's wife reports Lasix dose was increased this past week; patient states he hasn't noted improvement in abdominal distention or LE edema  -Strict I/O's, renally dose mediations, AM labs    5/12  -BUN/Cr trending down today, s/p short course of IV  fluids overnight  -Holding diuretics for now, Nephrology following    Chronic combined systolic and diastolic congestive heart failure  Patient is identified as having Combined Systolic and Diastolic heart failure that is Acute on chronic. CHF is currently controlled and uncontrolled due to Continued edema of extremities. Latest ECHO performed and demonstrates- Results for orders placed during the hospital encounter of 04/28/22    Echo    Interpretation Summary  · The left ventricle is normal in size with concentric hypertrophy and moderately decreased systolic function.  · The estimated ejection fraction is 35%.  · Grade III left ventricular diastolic dysfunction.  · Mild right ventricular enlargement with moderately reduced right ventricular systolic function.  · Mild left atrial enlargement.  · Mild right atrial enlargement.  · There is mild aortic valve stenosis.  · Aortic valve area is 1.53 cm2; peak velocity is 1.78 m/s; mean gradient is 7 mmHg.  · Mild mitral regurgitation.  · Mild tricuspid regurgitation.  · Elevated central venous pressure (15 mmHg).  · The estimated PA systolic pressure is 37 mmHg.  · There is abnormal septal wall motion. There is systolic and diastolic flattening of the interventricular septum consistent with right ventricle pressure and volume overload.  · There is moderate left ventricular global hypokinesis.  . Continue Beta Blocker, ACE/ARB, Furosemide and Aldactone and monitor clinical status closely. Monitor on telemetry. Patient is off CHF pathway.  Monitor strict Is&Os and daily weights.  Place on fluid restriction of 1.5 L. Continue to stress to patient importance of self efficacy and  on diet for CHF. Last BNP reviewed- and noted below   Recent Labs   Lab 05/12/23  0632   *     TTE with DD and LVEF 50%  Telemetry monitoring  Holding diuretics due to acute on CKD  Strict I/O's, Na/fluid restriction  Cardiology following    Symptomatic anemia  Acute on chronic  macrocytic anemia  Likely contributing to his fatigue/weakness  Transfuse 1 unit PRBC (5/12)  Fecal occult blood+ patient does reports dark, tarry stools  Has previously had EGD and c-scope in 2021    Weakness of both lower extremities  Patient presented with worsening bilateral lower extremity weakness.  CT lumbar spine and hips/pelvis showed degenerative disease.  Patient without evidence of bowel/bladder incontinence, lumbar back pain, hip pain, and has 5/5 strength throughout with the exception of 4/5 strength upon left leg raise.  Sensation to light touch grossly intact throughout.  Patient remains afebrile without leukocytosis.  UA negative for UTI.  Patient does have evidence of slight elevated anion gap metabolic acidosis with AG measuring 18 and bicarb 17. Ortho/spine consulted who recommended metabolic workup, PT/OT, and conservative therapies and patient will be evaluated in the morning.  Plan:  -Continue current pain regimen, titrate as needed  -Bowel regimen  -Bedrest  -IVFs prn  -Antiemetics prn  -Tylenol as needed for fever   -f/u ortho/spine  -f/u cultures  -PT/OT    5/11  -MRI lumbar spine currently pending    5/12  -Unable to obtain MRI due to incompatible PM  -PT/OT following, able to ambulate some, SNF recommended    Atrial fibrillation with chronic anticoagulation with Eliquis  Patient with Paroxysmal (<7 days) atrial fibrillation which is controlled currently with Beta Blocker. Patient is currently in sinus rhythm.[id number] Score: 4. Anticoagulation indicated. Anticoagulation done with Eliquis.    CAD (coronary artery disease)  Patient currently follows Dr. Hood and is currently without chest pain or shortness of breath. Troponin was noted to be elevated at 0.250 with repeat 0.239 in absence of chest pain.  EKG showed paced rhythm.  Elevation likely secondary to VAIBHAV but will continue to monitor to rule out ACS.  Current hemoglobin measuring 7.8 and is below threshold for transfusion in known  cardiac patient.  Will type/strain and prepare 1 unit but hold transfusion following repeat H/H this morning.  Plan:  -telemetry  -trend troponin  -f/u labs  -type/screen, transfuse if repeat hemoglobin less than 8  -serial EKGs p.r.n. chest pain  -continue home medications, titrate as needed  -RAMIREZ therapy p.r.n.    -Cardiology following    Mixed restrictive and obstructive lung disease  Not presently in acute exacerbation and is without signs/symptoms of distress. Patient currently saturating  100% on room air.  Plan:  -Continue home medications, titrate as needed  -Titrate oxygen requirements as needed  -Incentive spirometry   -Monitor pulse oximetry  -Duonebs prn     Hypertension  Currently normotensive. BP currently ranging from  systolic.  Plan:  -Optimize pain control   -Continue home medications, titrate as needed   -Monitor BP  -Low salt/cardiac diet when not NPO  -IV hydralazine prn for SBP>160 or DBP>90       5/12  -Continue to hold home losartan and spironolactone due to acute on CKD  -Resume when appropriate     Hyperlipemia  Patient is chronically on statin.will continue for now. Last Lipid Panel:   Lab Results   Component Value Date    CHOL 124 07/14/2022    HDL 43 07/14/2022    LDLCALC 54.4 (L) 07/14/2022    TRIG 133 07/14/2022    CHOLHDL 34.7 07/14/2022   Plan:  -Continue home medication  -low fat/low calorie diet    Obstructive sleep apnea  Currently on CPAP palpation.  Plan:  -continue home CPAP    Gastroesophageal reflux disease  Chronic. Stable. Currently asymptomatic. Home medications include PPI/Antacids as needed.  Plan:  -Continue PPI/Antacids as needed     Class 1 obesity due to excess calories with serious comorbidity and body mass index (BMI) of 32.0 to 32.9 in adult  Body mass index is 32.82 kg/m². Morbid obesity complicates all aspects of disease management from diagnostic modalities to treatment. Weight loss encouraged and health benefits explained to patient.     S/P CABG x  3        MI, old          VTE Risk Mitigation (From admission, onward)         Ordered     apixaban tablet 2.5 mg  2 times daily         05/10/23 2336     Reason for No Pharmacological VTE Prophylaxis  Once        Question:  Reasons:  Answer:  Already adequately anticoagulated on oral Anticoagulants    05/10/23 2324     IP VTE HIGH RISK PATIENT  Once         05/10/23 2324     Place sequential compression device  Until discontinued         05/10/23 2324                Discharge Planning   LITTLE:      Code Status: Full Code   Is the patient medically ready for discharge?:     Reason for patient still in hospital (select all that apply): Patient trending condition, Treatment, Consult recommendations and Pending disposition           Bobby Garcia MD  Department of Hospital Medicine   [facility] - Med Surg

## 2023-05-12 NOTE — ASSESSMENT & PLAN NOTE
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  Patient is noted to be on multiple antihypertensive medications known to be nephrotoxic.  Plan:  -Avoid nephrotoxins (holding losartan and spironolactone)  -Monitor renal function  -Renally dose medications  -IVFs prn    5/11  -UA with trace protein  -Patient's wife reports Lasix dose was increased this past week; patient states he hasn't noted improvement in abdominal distention or LE edema  -Strict I/O's, renally dose mediations, AM labs    5/12  -BUN/Cr trending down today, s/p short course of IV fluids overnight  -Holding diuretics for now, Nephrology following

## 2023-05-12 NOTE — ASSESSMENT & PLAN NOTE
Some the symptoms related to leg crams may be due to iron deficiency anemia  Low iron sats as low as 3% in the past  Prior endoscopies in 2020 unremarkable  Anemia likely multifactorial: iron loss due to eliquis + CKD + poor diet    Suggest repeat iron panel, replace if low  Epogen OK  Consider PRBC transfusion

## 2023-05-12 NOTE — PLAN OF CARE
05/12/23 1531   Post-Acute Status   Post-Acute Authorization Placement   Post-Acute Placement Status Pending payor review/awaiting authorization (if required)   Coverage peoples health   Discharge Plan   Discharge Plan B Skilled Nursing Facility     Pt and spouse chose [facility]. [facility] is submitting for auth today.

## 2023-05-13 PROBLEM — M25.562 ACUTE PAIN OF LEFT KNEE: Status: ACTIVE | Noted: 2023-01-01

## 2023-05-13 NOTE — PT/OT/SLP PROGRESS
"Physical Therapy  Treatment    Jake Ortiz   MRN: [id number]   Admitting Diagnosis: Acute renal failure superimposed on stage 3 chronic kidney disease       PT Start Time: 0955     PT Stop Time: 1010    PT Total Time (min): 15 min       Billable Minutes:  Therapeutic Activity 15    Treatment Type: Treatment  PT/PTA: PT     Number of PTA visits since last PT visit: 0       General Precautions: Standard, fall  Orthopedic Precautions: N/A  Braces: N/A  Respiratory Status: Room air    Spiritual, Cultural Beliefs, Rastafarian Practices, Values that Affect Care: no    Subjective:  Communicated with nursing (Beth) and performed chart review via epic system prior to session.  Pt reported pain (HA and knee pain) but agreeable to PT services.     Pain/Comfort  Pain Rating 1: 8/10 (reported HA pain "in the skin" and LLE (knee) pain)  Pain Addressed 1: Reposition, Distraction    Objective:   Patient found with: peripheral IV, telemetry    Functional Mobility:  Bed Mobility:    Supine <> sit min A for LE management. Pt tolerated sitting EOB x 3-5 mins with increased pain and unable to continue  Lateral scooting x 3 reps with CGA    Transfers:   unable    Gait:    unable    Treatment and Education:  Educated pt on benefits of consistent participation in PT services to meet functional goals. Educated pt on call don't fall policy and use of call button to alert nursing staff of needs. Pt expressed understanding.       AM-PAC 6 CLICK MOBILITY  How much help from another person does this patient currently need?   1 = Unable, Total/Dependent Assistance  2 = A lot, Maximum/Moderate Assistance  3 = A little, Minimum/Contact Guard/Supervision  4 = None, Modified [place]/Independent    Turning over in bed (including adjusting bedclothes, sheets and blankets)?: 3  Sitting down on and standing up from a chair with arms (e.g., wheelchair, bedside commode, etc.): 1  Moving from lying on back to sitting on the side of the bed?: 3  Moving " to and from a bed to a chair (including a wheelchair)?: 1  Need to walk in hospital room?: 1  Climbing 3-5 steps with a railing?: 1  Basic Mobility Total Score: 10    AM-PAC Raw Score CMS G-Code Modifier Level of Impairment Assistance   6 % Total / Unable   7 - 9 CM 80 - 100% Maximal Assist   10 - 14 CL 60 - 80% Moderate Assist   15 - 19 CK 40 - 60% Moderate Assist   20 - 22 CJ 20 - 40% Minimal Assist   23 CI 1-20% SBA / CGA   24 CH 0% Independent/ Mod I     Patient left supine with all lines intact, call button in reach, nursing notified, and wife present.     Assessment:  Jake Ortiz is a 82 y.o. male with a medical diagnosis of Acute renal failure superimposed on stage 3 chronic kidney disease and presents with deficits listed below. Increased pain to L knee limited OOB participation. Nsg notified. Pt will benefit from continued PT services to address deficits and progress toward baseline.    Rehab identified problem list/impairments: weakness, impaired endurance, impaired functional mobility, gait instability, impaired balance, decreased coordination, pain, decreased safety awareness, decreased lower extremity function    Rehab potential is fair.    Activity tolerance: Fair    Discharge recommendations: nursing facility, skilled      Barriers to discharge:      Equipment recommendations: walker, rolling     GOALS:   Multidisciplinary Problems       Physical Therapy Goals          Problem: Physical Therapy    Goal Priority Disciplines Outcome Goal Variances Interventions   Physical Therapy Goal     PT, PT/OT Ongoing, Progressing     Description: LT23  1. PT WILL COMPLETE BED MOBILITY IND  2. PT WILL T/F TO CHAIR WITH RW AND SBA  3. PT WILL GT TRAIN X 150' WITH RW AND SBA                       PLAN:    Patient to be seen 3 x/week to address the above listed problems via gait training, therapeutic activities, therapeutic exercises, neuromuscular re-education  Plan of Care expires: 23  Plan  of Care reviewed with: patient, spouse         05/13/2023

## 2023-05-13 NOTE — PLAN OF CARE
.Plan of care reviewed with patient, pt and family member verbalized understanding.  Pt remains free from falls this shift, safety precautions in place.bed alarm set.  Pt remains free from skin breakdown, pt educated on the importance of frequent weight shift to decrease the risk of pressure injury. Pt verbalized understanding teaching and outcomes.  AAOx4,NAD noted at this time.      Problem: Adult Inpatient Plan of Care  Goal: Plan of Care Review  Outcome: Adequate for Care Transition  Goal: Patient-Specific Goal (Individualized)  Outcome: Adequate for Care Transition  Goal: Absence of Hospital-Acquired Illness or Injury  Outcome: Adequate for Care Transition  Goal: Optimal Comfort and Wellbeing  Outcome: Adequate for Care Transition  Goal: Readiness for Transition of Care  Outcome: Adequate for Care Transition     Problem: Diabetes Comorbidity  Goal: Blood Glucose Level Within Targeted Range  Outcome: Adequate for Care Transition     Problem: Fluid and Electrolyte Imbalance (Acute Kidney Injury/Impairment)  Goal: Fluid and Electrolyte Balance  Outcome: Adequate for Care Transition     Problem: Oral Intake Inadequate (Acute Kidney Injury/Impairment)  Goal: Optimal Nutrition Intake  Outcome: Adequate for Care Transition     Problem: Renal Function Impairment (Acute Kidney Injury/Impairment)  Goal: Effective Renal Function  Outcome: Adequate for Care Transition     Problem: Skin Injury Risk Increased  Goal: Skin Health and Integrity  Outcome: Adequate for Care Transition

## 2023-05-13 NOTE — ASSESSMENT & PLAN NOTE
81 y/o male presented with leg weakness after lasix dose was recently increased:     Chronic combined systolic and diastolic congestive heart failure  Has diastolic CHF and mild systolic CHF  Pt's fluid management has taken a rather wide swing, going from lasix 80 mg po bid to current NS IVF's at 50 ml/hr  Suggest stop NS IVF's and watch  Likely will need to re-start lasix soon, but at a lower dose 20 mg po qd to bid  Noted in the past pt was on spironolactone 25 mg qd.     * Acute renal failure superimposed on stage 3 chronic kidney disease  VAIBHAV is mild, likely due to low BP and recent diarrhea; also from overdiuresis  s Cr in the past tended to fluctuate.  CKD stage 3 at baseline.     Currently has Pt no significant fluid gain  Lasix with recent dose increase to 80 mg po bid is on hold  Receiving NS IVF's at 50 ml/hr  Suggest stop IVF's to prevent fluid gain rebound      HTN: BP was low on admit, likely due to overdiuresis and diarrhea  Hypotension has resolved. BP normal today     H/o of DM-2: reviewed the chart     Symptomatic anemia  Some the symptoms related to leg crams may be due to iron deficiency anemia  Low iron sats as low as 3% in the past  Prior endoscopies in 2020 unremarkable  Anemia likely multifactorial: iron loss due to eliquis + CKD + poor diet     Suggest repeat iron panel, replace if low  Epogen OK  Consider PRBC transfusion        Plans and recommendations:  As discussed above  Complex case with multiple issues  Total critical care quality time spent 45 minutes including time needed to review the records, the   patient evaluation, documentation, face-to-face discussion with the patient,   more than 50% of the time was spent on coordination of care and counseling.

## 2023-05-13 NOTE — PROGRESS NOTES
[place] [facility]  Nephrology  Progress Note    Patient Name: Jake Ortiz  MRN: [id number]  Admission Date: 5/10/2023  Hospital Length of Stay: 1 days  Attending Provider: Bobby Garcia MD   Primary Care Physician: Byron Stevens MD  Principal Problem:Acute renal failure superimposed on stage 3 chronic kidney disease    Subjective:     HPI: Noted    Interval History: Pt was seen and examined. Labs and meds reviewed. Discussed with other providers. No new c/o's, no SOB, no leg swelling. IVF's were stopped yesterday.    Review of patient's allergies indicates:  No Known Allergies  Current Facility-Administered Medications   Medication Frequency    0.9%  NaCl infusion (for blood administration) Q24H PRN    acetaminophen suppository 650 mg Q6H PRN    acetaminophen tablet 650 mg Q8H PRN    aluminum-magnesium hydroxide-simethicone 200-200-20 mg/5 mL suspension 30 mL QID PRN    atorvastatin tablet 80 mg QHS    clonazePAM tablet 1 mg Nightly PRN    dextrose 10% bolus 125 mL 125 mL PRN    dextrose 10% bolus 250 mL 250 mL PRN    dextrose 40 % gel 15,000 mg PRN    dextrose 40 % gel 30,000 mg PRN    glucagon (human recombinant) injection 1 mg PRN    HYDROcodone-acetaminophen 5-325 mg per tablet 1 tablet Q6H PRN    iron sucrose injection 200 mg Daily    loperamide capsule 2 mg QID PRN    melatonin tablet 6 mg Nightly PRN    morphine injection 2 mg Q4H PRN    naloxone 0.4 mg/mL injection 0.02 mg PRN    ondansetron injection 4 mg Q8H PRN    pantoprazole EC tablet 40 mg Daily    potassium chloride SA CR tablet 20 mEq Daily    pregabalin capsule 75 mg BID    promethazine tablet 25 mg Q6H PRN    senna-docusate 8.6-50 mg per tablet 1 tablet BID PRN    sodium chloride 0.9% flush 3 mL Q12H PRN     Facility-Administered Medications Ordered in Other Encounters   Medication Frequency    ondansetron injection 4 mg Once PRN       Objective:     Vital Signs (Most Recent):  Temp: 98.8 °F (37.1 °C) (05/13/23  0701)  Pulse: 62 (05/13/23 0701)  Resp: 16 (05/13/23 1011)  BP: (!) 117/59 (05/13/23 0701)  SpO2: 95 % (05/13/23 0701) Vital Signs (24h Range):  Temp:  [98.3 °F (36.8 °C)-99.9 °F (37.7 °C)] 98.8 °F (37.1 °C)  Pulse:  [58-71] 62  Resp:  [16-18] 16  SpO2:  [95 %-96 %] 95 %  BP: (117-122)/(56-59) 117/59     Weight: 97.9 kg (215 lb 13.3 oz) (05/12/23 0406)  Body mass index is 32.82 kg/m².  Body surface area is 2.17 meters squared.    I/O last 3 completed shifts:  In: 122.3 [I.V.:122.3]  Out: -      Physical Exam  Vitals and nursing note reviewed.   Constitutional:       Appearance: Normal appearance. He is ill-appearing.   Cardiovascular:      Rate and Rhythm: Normal rate and regular rhythm.      Pulses: Normal pulses.      Heart sounds: Normal heart sounds.   Pulmonary:      Breath sounds: No rales.   Abdominal:      General: Abdomen is flat.      Tenderness: There is no abdominal tenderness.   Musculoskeletal:      Right lower leg: No edema.      Left lower leg: No edema.   Skin:     General: Skin is dry.   Neurological:      Mental Status: He is alert and oriented to person, place, and time.   Psychiatric:         Behavior: Behavior normal.        Significant Labs: reviewed  BMP  Lab Results   Component Value Date     05/13/2023    K 5.0 05/13/2023     05/13/2023    CO2 18 (L) 05/13/2023    BUN 96 (H) 05/13/2023    CREATININE 2.7 (H) 05/13/2023    CALCIUM 9.0 05/13/2023    ANIONGAP 13 05/13/2023    EGFRNORACEVR 23 (A) 05/13/2023     Lab Results   Component Value Date    WBC 8.14 05/13/2023    HGB 7.8 (L) 05/13/2023    HCT 26.0 (L) 05/13/2023     (H) 05/13/2023     05/13/2023     Lab Results   Component Value Date    IRON 11 (L) 05/12/2023    TRANSFERRIN 182 (L) 05/12/2023    TIBC 269 05/12/2023    FESATURATED 4 (L) 05/12/2023          Significant Imaging: reviewed CXR    Assessment/Plan:     81 y/o male presented with leg weakness after lasix dose was recently increased:     Chronic combined  systolic and diastolic congestive heart failure  Fluid status stable at present  Pt presented first with overdiuresis (lasix 80 mg bid), then was given IVF's  IVF's now off  Expect will need to start diuretics again soon, but at a lower dose, 20 mg po qd to bid     * Acute renal failure superimposed on stage 3 chronic kidney disease  s Cr stable, no significant change  VAIBHAV is mild, likely due to low BP and recent diarrhea; also from overdiuresis  s Cr in the past tended to fluctuate.  CKD stage 3 at baseline.       HTN: BP was low on admit, likely due to overdiuresis and diarrhea  Hypotension has resolved. BP normal today     H/o of DM-2: reviewed the chart     Symptomatic anemia  Some of the symptoms related to leg crams may be due to iron deficiency anemia  Very low iron sats  Prior endoscopies in 2020 unremarkable  Anemia likely multifactorial: iron loss due to eliquis + CKD + poor diet     Recommend:  Retacrit/epogen   IV iron replacement: venofer 200 mg IV q d x 5  Consider PRBC transfusion  Consider consult GI        Plans and recommendations:  As discussed above  Total time spent 45 minutes including time needed to review the records, the   patient evaluation, documentation, face-to-face discussion with the patient,   more than 50% of the time was spent on coordination of care and counseling.          Rusty Aquino MD  Nephrology  [facility] - Med Surg

## 2023-05-13 NOTE — CONSULTS
CC:  L knee pain      HISTORY       HPI:  82M, multiple medical comorbidities, admitted for generalized weakness 5.10.23    Complains of L knee pain since prior to admission which he attributes to a fall  States pain has been getting better    Pain 3/10, dull achy, ant med knee  No locking catching giving way  No prior knee injuries/surgeries  No numbness/tingling  No hip pain      ROS:  Constitutional: Denies fever/chills  Neurological: Denies numbness/tingling (any exceptions noted in orthopaedic exam)   Psychiatric/Behavioral: Denies change in normal mood  Eyes: Denies change in vision  Cardiovascular: Denies chest pain  Respiratory: Denies shortness of breath  Hematologic/Lymphatic: Denies easy bleeding/bruising   Skin: Denies new rash or skin lesions   Gastrointestinal: Denies nausea/vomitting/diarrhea, change in bowel habits, abdominal pain   Allergic/Immunologic: Denies adverse reactions to current medications  Musculoskeletal: see HPI    PAST MEDICAL HISTORY:   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    #4 PROSTATE BIOPSY, LEFT APEX: ADENOCARCINOMA, FARHAD GRADE 3 + 3 = 6, IN 1 of 2 CORES 9/8/2014    Anemia     Arthritis     Atrial fibrillation 10/19/2017    Back pain     Congestive heart failure 03/05/2018    Coronary artery disease     DM (diabetes mellitus) 2018     am 06/23/2020    DM (diabetes mellitus) 2016     am 08/17/2021    Hyperlipemia     Hypertension     Myocardial infarction     NASREEN (obstructive sleep apnea) 06/05/2018    Prostate cancer 2015    Tobacco dependence     Type 2 diabetes mellitus      PAST SURGICAL HISTORY:   Past Surgical History:   Procedure Laterality Date    COLONOSCOPY N/A 9/22/2020    Procedure: COLONOSCOPY;  Surgeon: Javier Farmer MD;  Location: [facility];  Service: Endoscopy;  Laterality: N/A;    CORONARY ARTERY BYPASS GRAFT  1987    EPIDURAL STEROID INJECTION N/A 11/22/2019    Procedure:  Lumbar L5/S1 IL XUAN;  Surgeon: Tacho Trivedi MD;  Location: [facility];  Service: Pain Management;  Laterality: N/A;    EPIDURAL STEROID INJECTION N/A 1/6/2020    Procedure: Lumbar L5/S1 IL XUAN;  Surgeon: Tacho Trivedi MD;  [facility];  Service: Pain Management;  Laterality: N/A;    EPIDURAL STEROID INJECTION N/A 2/10/2020    Procedure: Caudal XUAN;  Surgeon: Tacho Trivedi MD;  [facility];  Service: Pain Management;  Laterality: N/A;    ESOPHAGOGASTRODUODENOSCOPY N/A 9/22/2020    Procedure: EGD (ESOPHAGOGASTRODUODENOSCOPY);  Surgeon: Javier Farmer MD;  Location: [facility] ENDO;  Service: Endoscopy;  Laterality: N/A;    INJECTION OF ANESTHETIC AGENT AROUND MEDIAL BRANCH NERVES INNERVATING LUMBAR FACET JOINT Bilateral 10/12/2020    Procedure: Bilateral L3-5 MBB;  Surgeon: Tacho Trivedi MD;  Location: [facility]IN [facility]GT;  Service: Pain Management;  Laterality: Bilateral;    INJECTION OF ANESTHETIC AGENT AROUND MEDIAL BRANCH NERVES INNERVATING LUMBAR FACET JOINT Bilateral 12/21/2020    Procedure: Bilateral L3-5 MBB with steroid;  Surgeon: Tacho Trivedi MD;  Location: [facility] PAIN MGT;  Service: Pain Management;  Laterality: Bilateral;    INSERTION OF SPINAL NEUROSTIMULATOR N/A 3/23/2023    Procedure: INSERTION, NEUROSTIMULATOR, SPINAL;  Surgeon: Philipp Demarco MD;  Location: [facility] OR;  Service: Pain Management;  Laterality: N/A;    INTRALUMINAL GASTROINTESTINAL TRACT IMAGING VIA CAPSULE N/A 12/29/2021    Procedure: IMAGING PROCEDURE, GI TRACT, INTRALUMINAL, VIA CAPSULE;  Surgeon: Tahir Geronimo RN;  Location: [facility] ENDO;  Service: Endoscopy;  Laterality: N/A;    PROSTATE SURGERY      prostate radiation    RADIOFREQUENCY THERMOCOAGULATION Left 6/4/2021    Procedure: Left L3-5 Lumbar RFA;  Surgeon: Tacho Trivedi MD;  [facility];  Service: Pain Management;  Laterality: Left;    RADIOFREQUENCY THERMOCOAGULATION Right 6/18/2021    Procedure: Right L3-5 Lumbar RFA;   Surgeon: Tacho Trivedi MD;  Location: [facility] PAIN MGT;  Service: Pain Management;  Laterality: Right;    REPLACEMENT OF PACEMAKER GENERATOR Left 2022    Procedure: REPLACEMENT, PULSE GENERATOR, CARDIAC PACEMAKER/CRT-P device;  Surgeon: Darrel Carrero MD;  Location: [facility] CATH LAB;  Service: Cardiology;  Laterality: Left;  NOT MRI safe   Pacer and leads implanted 2017, Dr. Souleymane CARROLLT Consulta CRT-P C4TR01, [id number]   A lead: Mdt 5076 CapSureFix® Novus, [id number]   RV lead: Mdt 5076 CapSureFix® Novus, [id number]   LV lead: Jovan 4196 At    SELECTIVE INJECTION OF ANESTHETIC AGENT AROUND LUMBAR SPINAL NERVE ROOT BY TRANSFORAMINAL APPROACH Bilateral 2022    Procedure: Bilateral L3/4 TF XUAN with RN IV sedation;  Surgeon: Philipp Demarco MD;  Location: [facility] PAIN MGT;  Service: Pain Management;  Laterality: Bilateral;    SPINE SURGERY      fusion    TONSILLECTOMY      TRIAL OF SPINAL CORD NERVE STIMULATOR N/A 2023    Procedure: Trial, Neurostimulator, Spinal Cord with RN IV sedation;  Surgeon: Philipp Demarco MD;  Location: [facility] PAIN MGT;  Service: Pain Management;  Laterality: N/A;     FAMILY HISTORY:   Family History   Problem Relation Age of Onset    Heart attack Mother     Diabetes Mother     Heart disease Mother     Cataracts Mother     Stroke Father     Heart disease Father     Heart disease Brother      SOCIAL HISTORY:   Social History     Socioeconomic History    Marital status:     Number of children: 0   Occupational History    Occupation: retired     Employer: [employer]   Tobacco Use    Smoking status: Former     Packs/day: 1.50     Years: 20.00     Pack years: 30.00     Types: Cigarettes     Start date:      Quit date:      Years since quittin.3    Smokeless tobacco: Never   Substance and Sexual Activity    Alcohol use: No    Drug use: No    Sexual activity: Not Currently     Social Determinants of Health     Financial Resource Strain: Low Risk      Difficulty of Paying Living Expenses: Not hard at all   Food Insecurity: No Food Insecurity    Worried About Running Out of Food in the Last Year: Never true    Ran Out of Food in the Last Year: Never true   Transportation Needs: No Transportation Needs    Lack of Transportation (Medical): No    Lack of Transportation (Non-Medical): No   Physical Activity: Insufficiently Active    Days of Exercise per Week: 4 days    Minutes of Exercise per Session: 10 min   Stress: No Stress Concern Present    Feeling of Stress : Not at all   Social Connections: Moderately Integrated    Frequency of Communication with Friends and Family: More than three times a week    Frequency of Social Gatherings with Friends and Family: More than three times a week    Attends Orthodox Services: Never    Active Member of Clubs or Organizations: Yes    Attends Club or Organization Meetings: More than 4 times per year    Marital Status:    Housing Stability: Low Risk     Unable to Pay for Housing in the Last Year: No    Number of Places Lived in the Last Year: 1    Unstable Housing in the Last Year: No     MEDICATIONS:   Current Facility-Administered Medications:     0.9%  NaCl infusion (for blood administration), , Intravenous, Q24H PRN, Carlos Britton MD    0.9%  NaCl infusion (for blood administration), , Intravenous, Q24H PRN, Bobby Garcia MD    acetaminophen suppository 650 mg, 650 mg, Rectal, Q6H PRN, Carlos Britton MD    acetaminophen tablet 650 mg, 650 mg, Oral, Q8H PRN, Carlos Britton MD    aluminum-magnesium hydroxide-simethicone 200-200-20 mg/5 mL suspension 30 mL, 30 mL, Oral, QID PRN, Carlos Britton MD    atorvastatin tablet 80 mg, 80 mg, Oral, QHS, Carlos Britton MD, 80 mg at 05/12/23 2032    clonazePAM tablet 1 mg, 1 mg, Oral, Nightly PRN, Carlos Britton MD, 1 mg at 05/12/23 2036    dextrose 10% bolus 125 mL 125 mL, 12.5 g, Intravenous, PRN, Carlos Britton MD    dextrose 10% bolus 250 mL  250 mL, 25 g, Intravenous, PRN, Carlos Britton MD    dextrose 40 % gel 15,000 mg, 15 g, Oral, PRN, Carlos Britton MD    dextrose 40 % gel 30,000 mg, 30 g, Oral, PRN, Carlos Britton MD    epoetin niraj-epbx injection 5,000 Units, 5,000 Units, Subcutaneous, Q7 Days, Rusty Aquino MD, 5,000 Units at 05/13/23 1144    glucagon (human recombinant) injection 1 mg, 1 mg, Intramuscular, PRN, Carlos Britton MD    HYDROcodone-acetaminophen 5-325 mg per tablet 1 tablet, 1 tablet, Oral, Q6H PRN, Carlos Britton MD, 1 tablet at 05/13/23 1011    [START ON 5/14/2023] iron sucrose injection 200 mg, 200 mg, Intravenous, Daily, Rusty Aquino MD    loperamide capsule 2 mg, 2 mg, Oral, QID PRN, Bobby Garcia MD, 2 mg at 05/11/23 1022    melatonin tablet 6 mg, 6 mg, Oral, Nightly PRN, Carlos Britton MD    morphine injection 2 mg, 2 mg, Intravenous, Q4H PRN, Carlos Britton MD    naloxone 0.4 mg/mL injection 0.02 mg, 0.02 mg, Intravenous, PRN, Carlos Britton MD    ondansetron injection 4 mg, 4 mg, Intravenous, Q8H PRN, Carlos Britton MD    pantoprazole EC tablet 40 mg, 40 mg, Oral, Daily, Carlos Britton MD, 40 mg at 05/13/23 0832    potassium chloride SA CR tablet 20 mEq, 20 mEq, Oral, Daily, Carlos Britton MD, 20 mEq at 05/13/23 0832    pregabalin capsule 75 mg, 75 mg, Oral, BID, Carlos Britton MD, 75 mg at 05/13/23 0832    promethazine tablet 25 mg, 25 mg, Oral, Q6H PRN, Carlos Britton MD    senna-docusate 8.6-50 mg per tablet 1 tablet, 1 tablet, Oral, BID PRN, Carlos Britton MD    sodium chloride 0.9% flush 3 mL, 3 mL, Intravenous, Q12H PRN, Carlos Britton MD    Facility-Administered Medications Ordered in Other Encounters:     ondansetron injection 4 mg, 4 mg, Intravenous, Once PRN, Tacho Trivedi MD  ALLERGIES: Review of patient's allergies indicates:  No Known Allergies      EXAM      VITAL SIGNS:   /73 (BP Location: Left arm, Patient Position: Sitting)   " Pulse 67   Temp 97.9 °F (36.6 °C) (Oral)   Resp 16   Ht 5' 8" (1.727 m)   Wt 97.9 kg (215 lb 13.3 oz)   SpO2 96%   BMI 32.82 kg/m²       PE:  General:  no acute distress, appears stated age    Neuro: alert and oriented x3  Psych: normal mood  Head: normocephalic, atraumatic.   Eyes: no scleral icterus  Mouth: moist mucous membranes  Cardiovascular: extremities warm and well perfused  Lungs: breathing comfortably, equal chest rise bilat  Skin: clean, dry, intact (any exceptions noted in below musculoskeletal exam)    Musculoskeletal:  RUE:  No gross deformities, wounds  No crepitus, No TTP  Motor intact deltoid, bicep, tricep, wrist flexion/extension, finger flexion/extension, interossei  Sensation intact axillary, radial, median, ulnar nerves  Palp rad pulse, BCR    LUE:  No gross deformities, wounds  No crepitus, No TTP  Motor intact deltoid, bicep, tricep, wrist flexion/extension, finger flexion/extension, interossei  Sensation intact axillary, radial, median, ulnar nerves  Palp rad pulse, BCR    RLE:  No gross deformities, wounds  No crepitus, No TTP  Motor intact hip flex, quad, Tib Ant, gastroc, EHL, FHL.  Sensation intact saphenous, sural, deep/superficial peroneal, tibial nerves  Palp DP/PT pulse, BCR    LLE:  No gross deformities, wounds  No crepitus, No TTP  No knee effusion on exam  No redness or signs of infection  No knee pain w/ axial loading or micromotion  Neg ant/post drawer, no varus/valgus instability  Motor intact hip flex, quad, Tib Ant, gastroc, EHL, FHL.  Sensation intact saphenous, sural, deep/superficial peroneal, tibial nerves  Palp DP/PT pulse, BCR          XRAYS:  XR/CT L knee demonstrate no fx/dislocation  (I independently reviewed and interpreted the above imaging)    MEDICAL DECISION MAKING       82M, mult medical comorbidities, admitted for debility    L knee pain  No significant effusion on exam  No pain w/ axial loading or micromotion  No concern for septic " joint    Rec  WBAT, ROMAT  Compressive wrap  Ice prn   medical management          =====================  Chi Arriola MD  Orthopaedic Surgery

## 2023-05-13 NOTE — SUBJECTIVE & OBJECTIVE
Interval History: Pt was seen and examined. Labs and meds reviewed. Discussed with other providers. No new c/o's, no SOB, no leg swelling. IVF's were stopped yesterday.    Review of patient's allergies indicates:  No Known Allergies  Current Facility-Administered Medications   Medication Frequency    0.9%  NaCl infusion (for blood administration) Q24H PRN    acetaminophen suppository 650 mg Q6H PRN    acetaminophen tablet 650 mg Q8H PRN    aluminum-magnesium hydroxide-simethicone 200-200-20 mg/5 mL suspension 30 mL QID PRN    atorvastatin tablet 80 mg QHS    clonazePAM tablet 1 mg Nightly PRN    dextrose 10% bolus 125 mL 125 mL PRN    dextrose 10% bolus 250 mL 250 mL PRN    dextrose 40 % gel 15,000 mg PRN    dextrose 40 % gel 30,000 mg PRN    glucagon (human recombinant) injection 1 mg PRN    HYDROcodone-acetaminophen 5-325 mg per tablet 1 tablet Q6H PRN    iron sucrose injection 200 mg Daily    loperamide capsule 2 mg QID PRN    melatonin tablet 6 mg Nightly PRN    morphine injection 2 mg Q4H PRN    naloxone 0.4 mg/mL injection 0.02 mg PRN    ondansetron injection 4 mg Q8H PRN    pantoprazole EC tablet 40 mg Daily    potassium chloride SA CR tablet 20 mEq Daily    pregabalin capsule 75 mg BID    promethazine tablet 25 mg Q6H PRN    senna-docusate 8.6-50 mg per tablet 1 tablet BID PRN    sodium chloride 0.9% flush 3 mL Q12H PRN     Facility-Administered Medications Ordered in Other Encounters   Medication Frequency    ondansetron injection 4 mg Once PRN       Objective:     Vital Signs (Most Recent):  Temp: 98.8 °F (37.1 °C) (05/13/23 0701)  Pulse: 62 (05/13/23 0701)  Resp: 16 (05/13/23 1011)  BP: (!) 117/59 (05/13/23 0701)  SpO2: 95 % (05/13/23 0701) Vital Signs (24h Range):  Temp:  [98.3 °F (36.8 °C)-99.9 °F (37.7 °C)] 98.8 °F (37.1 °C)  Pulse:  [58-71] 62  Resp:  [16-18] 16  SpO2:  [95 %-96 %] 95 %  BP: (117-122)/(56-59) 117/59     Weight: 97.9 kg (215 lb 13.3 oz) (05/12/23 0406)  Body mass index is 32.82  kg/m².  Body surface area is 2.17 meters squared.    I/O last 3 completed shifts:  In: 122.3 [I.V.:122.3]  Out: -      Physical Exam  Vitals and nursing note reviewed.   Constitutional:       Appearance: Normal appearance. He is ill-appearing.   Cardiovascular:      Rate and Rhythm: Normal rate and regular rhythm.      Pulses: Normal pulses.      Heart sounds: Normal heart sounds.   Pulmonary:      Breath sounds: No rales.   Abdominal:      General: Abdomen is flat.      Tenderness: There is no abdominal tenderness.   Musculoskeletal:      Right lower leg: No edema.      Left lower leg: No edema.   Skin:     General: Skin is dry.   Neurological:      Mental Status: He is alert and oriented to person, place, and time.   Psychiatric:         Behavior: Behavior normal.        Significant Labs: reviewed  BMP  Lab Results   Component Value Date     05/13/2023    K 5.0 05/13/2023     05/13/2023    CO2 18 (L) 05/13/2023    BUN 96 (H) 05/13/2023    CREATININE 2.7 (H) 05/13/2023    CALCIUM 9.0 05/13/2023    ANIONGAP 13 05/13/2023    EGFRNORACEVR 23 (A) 05/13/2023     Lab Results   Component Value Date    WBC 8.14 05/13/2023    HGB 7.8 (L) 05/13/2023    HCT 26.0 (L) 05/13/2023     (H) 05/13/2023     05/13/2023       Significant Imaging: reviewed CXR

## 2023-05-13 NOTE — PLAN OF CARE
TX COMPLETED: facilitated bed mobility with min A for LE management. Pt able to sit EOB but increased complaints of L knee pain and reported HA pain. Unable to continue, nsg notified. Recommend SNF

## 2023-05-14 PROBLEM — E87.5 HYPERKALEMIA: Status: ACTIVE | Noted: 2023-01-01

## 2023-05-14 NOTE — PROGRESS NOTES
[facility]  Nephrology  Progress Note    Patient Name: Jake Ortiz  MRN: [id number]  Admission Date: 5/10/2023  Hospital Length of Stay: 1 days  Attending Provider: Bobby Garcia MD   Primary Care Physician: Byron Stevens MD  Principal Problem:Acute renal failure superimposed on stage 3 chronic kidney disease    Subjective:     HPI: Noted    Interval History: Pt was seen and examined. Labs and meds reviewed. Discussed with other providers. No new c/o's, still not SOB, lag swelling has not returned yet.    Review of patient's allergies indicates:  No Known Allergies  Current Facility-Administered Medications   Medication Frequency    0.9%  NaCl infusion (for blood administration) Q24H PRN    0.9%  NaCl infusion (for blood administration) Q24H PRN    acetaminophen suppository 650 mg Q6H PRN    acetaminophen tablet 650 mg Q8H PRN    aluminum-magnesium hydroxide-simethicone 200-200-20 mg/5 mL suspension 30 mL QID PRN    atorvastatin tablet 80 mg QHS    clonazePAM tablet 1 mg Nightly PRN    dextrose 10% bolus 125 mL 125 mL PRN    dextrose 10% bolus 250 mL 250 mL PRN    dextrose 40 % gel 15,000 mg PRN    dextrose 40 % gel 30,000 mg PRN    epoetin niraj-epbx injection 5,000 Units Q7 Days    [START ON 5/15/2023] furosemide tablet 20 mg Daily    glucagon (human recombinant) injection 1 mg PRN    HYDROcodone-acetaminophen 5-325 mg per tablet 1 tablet Q6H PRN    iron sucrose injection 200 mg Daily    loperamide capsule 2 mg QID PRN    melatonin tablet 6 mg Nightly PRN    morphine injection 2 mg Q4H PRN    naloxone 0.4 mg/mL injection 0.02 mg PRN    ondansetron injection 4 mg Q8H PRN    pantoprazole EC tablet 40 mg Daily    potassium chloride SA CR tablet 20 mEq Daily    pregabalin capsule 75 mg BID    promethazine tablet 25 mg Q6H PRN    senna-docusate 8.6-50 mg per tablet 1 tablet BID PRN    sodium chloride 0.9% flush 3 mL Q12H PRN     Facility-Administered Medications Ordered in Other  Encounters   Medication Frequency    ondansetron injection 4 mg Once PRN       Objective:     Vital Signs (Most Recent):  Temp: 98.2 °F (36.8 °C) (05/14/23 1339)  Pulse: 61 (05/14/23 1339)  Resp: 18 (05/14/23 1339)  BP: (!) 110/53 (05/14/23 1339)  SpO2: 96 % (05/14/23 1339) Vital Signs (24h Range):  Temp:  [97.9 °F (36.6 °C)-99.9 °F (37.7 °C)] 98.2 °F (36.8 °C)  Pulse:  [60-73] 61  Resp:  [12-20] 18  SpO2:  [95 %-96 %] 96 %  BP: (110-129)/(53-73) 110/53     Weight: 97.1 kg (214 lb 1.1 oz) (05/14/23 0349)  Body mass index is 32.55 kg/m².  Body surface area is 2.16 meters squared.    I/O last 3 completed shifts:  In: 6604.2 [Blood:6604.2]  Out: -      Physical Exam  Vitals and nursing note reviewed.   Constitutional:       Appearance: Normal appearance.   Cardiovascular:      Rate and Rhythm: Normal rate and regular rhythm.      Pulses: Normal pulses.      Heart sounds: Normal heart sounds.   Pulmonary:      Effort: Pulmonary effort is normal.      Breath sounds: No rales.   Musculoskeletal:      Right lower leg: No edema.      Left lower leg: No edema.   Neurological:      Mental Status: He is alert and oriented to person, place, and time.   Psychiatric:         Behavior: Behavior normal.        Significant Labs: reviewed  BMP  Lab Results   Component Value Date     05/14/2023    K 5.4 (H) 05/14/2023     05/14/2023    CO2 17 (L) 05/14/2023    BUN 93 (H) 05/14/2023    CREATININE 2.6 (H) 05/14/2023    CALCIUM 8.1 (L) 05/14/2023    ANIONGAP 16 05/14/2023    EGFRNORACEVR 24 (A) 05/14/2023     Lab Results   Component Value Date    WBC 9.63 05/14/2023    HGB 8.4 (L) 05/14/2023    HCT 26.7 (L) 05/14/2023    MCV 94 05/14/2023     05/14/2023     Iron sat 4%      Significant Imaging: XR reviewed, mild pulmonary edema    Assessment/Plan:     83 y/o male presented with leg weakness after lasix dose was recently increased:     Chronic combined systolic and diastolic congestive heart failure  Per CXR this am,  has started to have pulmonary edema after holding lasix for a few days  Pulmonary edema is mild at present  Will re-start lasix 230 mg po qd today, so that pt will not fall behind fluid gain    To review: Pt presented first with overdiuresis (lasix 80 mg bid), then was given IVF's  IVF's now off     * Acute renal failure superimposed on stage 3 chronic kidney disease  s Cr stable, slowly returning towards prior baseline of close to 2   VAIBHAV is mild, likely due to low BP and recent diarrhea; also from overdiuresis  s Cr in the past tended to fluctuate.  All c/w pre-renal azotemia (not clinically concerning)  CKD stage 3 at baseline.       HTN: BP was low on admit, likely due to overdiuresis and diarrhea  Hypotension has improved     H/o of DM-2: reviewed the chart     Symptomatic anemia  Some of the symptoms related to leg crams may be due to iron deficiency anemia  Very low iron sats  Prior endoscopies in 2020 unremarkable  Anemia likely multifactorial: iron loss due to eliquis + CKD + poor diet     On Retacrit/epogen qw  On IV iron replacement: venofer 200 mg IV q d x 5  S/p PRBC transfusion  Consider consult GI        Plans and recommendations:  As discussed above  Total time spent 40 minutes including time needed to review the records, the   patient evaluation, documentation, face-to-face discussion with the patient,   more than 50% of the time was spent on coordination of care and counseling.        Rusty Aquino MD  Nephrology  [facility]

## 2023-05-14 NOTE — ASSESSMENT & PLAN NOTE
Presented on admission with c/o left knee pain after a fall  Noted swelling, XR with no fracture  CT with small to moderate effusion  Orthopedics consulted, no aspiration needed at this time, appreciate assitance  Pain control, compressive wrap, ice if needed

## 2023-05-14 NOTE — PLAN OF CARE
Patient resting in bed, remains free of falls and injuries this shift. VSS. No obvious s/sx of distress noted. Pain managed with PRN medications. ACE wrap placed to L knee as ordered, ice placed as needed. POC reviewed with the patient, verbalized understanding. No further needs at this time, call light within reach and bed alarm set. Continue POC as ordered, chart check completed and all orders reviewed.

## 2023-05-14 NOTE — ASSESSMENT & PLAN NOTE
Presented on admission with c/o left knee pain after a fall  Noted swelling, XR with no fracture  CT with small to moderate effusion  Orthopedics consulted, no aspiration needed at this time

## 2023-05-14 NOTE — SUBJECTIVE & OBJECTIVE
Review of Systems   All other systems reviewed and are negative.  Objective:     Vital Signs (Most Recent):  Temp: 98.2 °F (36.8 °C) (05/14/23 1339)  Pulse: 61 (05/14/23 1339)  Resp: 18 (05/14/23 1339)  BP: (!) 110/53 (05/14/23 1339)  SpO2: 96 % (05/14/23 1339) Vital Signs (24h Range):  Temp:  [97.9 °F (36.6 °C)-99.9 °F (37.7 °C)] 98.2 °F (36.8 °C)  Pulse:  [60-73] 61  Resp:  [16-20] 18  SpO2:  [95 %-96 %] 96 %  BP: (110-129)/(53-73) 110/53     Weight: 97.1 kg (214 lb 1.1 oz)  Body mass index is 32.55 kg/m².    Intake/Output Summary (Last 24 hours) at 5/14/2023 1612  Last data filed at 5/14/2023 1552  Gross per 24 hour   Intake --   Output 450 ml   Net -450 ml         Physical Exam  Constitutional:       General: He is not in acute distress.     Appearance: Normal appearance. He is obese. He is ill-appearing.   Cardiovascular:      Rate and Rhythm: Normal rate and regular rhythm.      Heart sounds: No murmur heard.  Pulmonary:      Effort: Pulmonary effort is normal. No respiratory distress.      Breath sounds: Normal breath sounds. No wheezing.   Abdominal:      General: There is distension.      Palpations: Abdomen is soft.      Tenderness: There is no abdominal tenderness.   Musculoskeletal:         General: Swelling (Left knee) present.      Left lower leg: Edema (Trace LLE edema) present.   Neurological:      Mental Status: He is alert.           Significant Labs: All pertinent labs within the past 24 hours have been reviewed.  CBC:   Recent Labs   Lab 05/13/23  0638 05/14/23  0706   WBC 8.14 9.63   HGB 7.8* 8.4*   HCT 26.0* 26.7*    216     CMP:   Recent Labs   Lab 05/13/23  0638 05/14/23  0706    139   K 5.0 5.4*    106   CO2 18* 17*   * 130*   BUN 96* 93*   CREATININE 2.7* 2.6*   CALCIUM 9.0 8.1*   PROT 6.7  --    ALBUMIN 2.8*  --    BILITOT 0.4  --    ALKPHOS 65  --    AST 34  --    ALT 26  --    ANIONGAP 13 16       Significant Imaging: I have reviewed all pertinent imaging  results/findings within the past 24 hours.    X-Ray Chest 1 View   Final Result      Findings of mild pulmonary edema with a small right-sided pleural effusion.         Electronically signed by: Carlos Flowers Jr., MD   Date:    05/14/2023   Time:    12:27      CT Knee Without Contrast Left   Final Result      No evidence of fracture or other acute osseous abnormality.  Small to moderate joint effusion.  Chondrocalcinosis.      All CT scans at this facility are performed  using dose modulation techniques as appropriate to performed exam including the following:  automated exposure control; adjustment of mA and/or kV according to the patients size (this includes techniques or standardized protocols for targeted exams where dose is matched to indication/reason for exam: i.e. extremities or head);  iterative reconstruction technique.         Electronically signed by: Tahir Borja MD   Date:    05/12/2023   Time:    14:16      CT Hip Without Contrast Left   Final Result      No evidence for hip fracture or dislocation.  Senescent changes      All CT scans   are performed using dose optimization techniques including the following: automated exposure control; adjustment of the mA and/or kV; use of iterative reconstruction technique.  Dose modulation was employed for ALARA by means of: Automated exposure control; adjustment of the mA and/or kV according to patient size (this includes techniques or standardized protocols for targeted exams where dose is matched to indication/reason for exam; i.e. extremities or head); and/or use of iterative reconstructive technique.         Electronically signed by: Jack Suresh   Date:    05/10/2023   Time:    22:44      CT Lumbar Spine Without Contrast   Final Result      No acute fracture or dislocation      Extensive degenerative joint disease      Atherosclerotic disease      Spinal stimulator device.  Correlate clinically      Prior postsurgical changes including laminectomy at  L4-L5 and prior posterior fixation at L2-L3.  Correlate to surgical history.      All CT scans at this facility are performed  using dose modulation techniques as appropriate to performed exam including the following:  automated exposure control; adjustment of mA and/or kV according to the patients size (this includes techniques or standardized protocols for targeted exams where dose is matched to indication/reason for exam: i.e. extremities or head);  iterative reconstruction technique.         Electronically signed by: Jack Suresh   Date:    05/10/2023   Time:    22:47      CT Head Without Contrast   Final Result      No acute abnormality.      Atrophy and chronic white matter changes      All CT scans   are performed using dose optimization techniques including the following: automated exposure control; adjustment of the mA and/or kV; use of iterative reconstruction technique.  Dose modulation was employed for ALARA by means of: Automated exposure control; adjustment of the mA and/or kV according to patient size (this includes techniques or standardized protocols for targeted exams where dose is matched to indication/reason for exam; i.e. extremities or head); and/or use of iterative reconstructive technique.         Electronically signed by: Jack Suresh   Date:    05/10/2023   Time:    18:25      X-Ray Chest AP Portable   Final Result      As above         Electronically signed by: Jack Suresh   Date:    05/10/2023   Time:    18:02      X-Ray Knee 3 View Left   Final Result      No acute fracture or dislocation.         Electronically signed by: Jack Suresh   Date:    05/10/2023   Time:    18:05

## 2023-05-14 NOTE — ASSESSMENT & PLAN NOTE
Patient with Paroxysmal (<7 days) atrial fibrillation which is controlled currently with Beta Blocker. Patient is currently in sinus rhythm.[id number] Score: 4. Anticoagulation indicated. Anticoagulation done with Eliquis.    Eliquis held, last dose 5/12 PM  Restart when appropriate

## 2023-05-14 NOTE — ASSESSMENT & PLAN NOTE
Patient currently follows Dr. Hood and is currently without chest pain or shortness of breath. Troponin was noted to be elevated at 0.250 with repeat 0.239 in absence of chest pain.  EKG showed paced rhythm.  Elevation likely secondary to VAIBHAV but will continue to monitor to rule out ACS.  Current hemoglobin measuring 7.8 and is below threshold for transfusion in known cardiac patient.  Will type/strain and prepare 1 unit but hold transfusion following repeat H/H this morning.  Plan:  -telemetry  -trend troponin  -f/u labs  -type/screen, transfuse if repeat hemoglobin less than 8  -serial EKGs p.r.n. chest pain  -continue home medications, titrate as needed  -RAMIREZ therapy p.r.n.    -Cardiology following    5/13/23  -Additional 1 unit PRBC today, monitor H/H  -Hold eliquis    5/14/23  -H/H 8.4/26.7 this AM  -Hold eliquis, possibly restart tomorrow if H/H stable

## 2023-05-14 NOTE — PROGRESS NOTES
[facility]– [place] 2[place] Medicine  Progress Note    Patient Name: Jake Ortiz  MRN: [id number]  Patient Class: OP- Observation   Admission Date: 5/10/2023  Length of Stay: 1 days  Attending Physician: Bobby Garcia MD  Primary Care Provider: Byron Stevens MD        Subjective:     Principal Problem:Acute renal failure superimposed on stage 3 chronic kidney disease        HPI:  Jake Ortiz is a 82 y.o. 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 who presented to the ED for worsening bilateral lower extremity weakness x2 days duration.  Patient reported upon waking earlier this morning, his legs gave out causing him to fall on his left knee has been unable to walk/stand since then.  Patient denied endorsing any head trauma, loss of consciousness, or sustaining any other injuries in his only complaining of left knee pain described as hurting/sore in nature, constant, currently rated 5/10 in severity with aggravating factors including weight-bearing, movement, and palpation to the affected area.  He denied endorsing any lower back pain, hip pain, numbness/tingling of his lower extremities, bowel/bladder incontinence, and reported all other review of systems negative except as noted above.  Of note, patient did have his nerve stimulator manipulated yesterday and reported being in his usual state of health prior to onset of symptoms.  Initial workup in the ED revealed CT lumbar spine and hip positive for degenerative disease but negative for evidence of fractures or dislocations.  Ortho/spine was  consulted by ED staff rolling further imaging via MRI with recommendations to admit to hospital medicine for continued medical management and treatment/workup of metabolic causes as well as PT/OT evaluation inpatient will be evaluated in the morning.      PCP: Byron Stevens      Overview/Hospital Course:  5/11/23 NAEON. Presented yesterday for weakness Wife at bedside, provides history.  Patient reportedly fell onto his left knee, c/o left knee pain, +ROM  Has been having trouble to stand and ambulate, fell several times per wife    Wife reports abdominal distended, BLE edema; recently has Lasix dose increased but not improvement  Patient with CHF, noted elevated BNP. Check TTE, Lasix IV, strict I/O's  H/H also trending down, known chronic anemia  Transfuse 1 unit PRBC today for symptmoatic anemia  Imodium given for diarrhea this AM, no further episodes since    5/12/23  NAEON. Patient reports fatigue, left knee pain, swelling; CT ordered  Cr 3.5 --> 2.9, received course of IV fluids overnight  H/H 8/25.4 after 1 unit PRBC yesterday  Fecal occult+, patient does reports dark, tarry stools    5/13/23 NAEON. Reports weakness, fatigue mild improvement c/o left knee pain  CT with small to moderate left knee effusion, consulted Orthopedics  Cr 2.7, Hg 7.8, discussed with Nephrology, 1 unit PRBC transfusion today    5/14/23  NAEON. Patient attempting sitting at edge of bed, attempting to urinate  Patient reports weakness, KING - overall improving  CXR today with mild pulmonary edema, small right pleural effusion    Discussed with Nephrology, plans to restarted Lasix today  K 5.4, will stop PO potassium supplementation, resume when appropriate  Upated patient's wife at bedside        Review of Systems   All other systems reviewed and are negative.  Objective:     Vital Signs (Most Recent):  Temp: 98.2 °F (36.8 °C) (05/14/23 1339)  Pulse: 61 (05/14/23 1339)  Resp: 18 (05/14/23 1339)  BP: (!) 110/53 (05/14/23 1339)  SpO2:  96 % (05/14/23 1339) Vital Signs (24h Range):  Temp:  [97.9 °F (36.6 °C)-99.9 °F (37.7 °C)] 98.2 °F (36.8 °C)  Pulse:  [60-73] 61  Resp:  [16-20] 18  SpO2:  [95 %-96 %] 96 %  BP: (110-129)/(53-73) 110/53     Weight: 97.1 kg (214 lb 1.1 oz)  Body mass index is 32.55 kg/m².    Intake/Output Summary (Last 24 hours) at 5/14/2023 1612  Last data filed at 5/14/2023 1552  Gross per 24 hour   Intake --   Output 450 ml   Net -450 ml         Physical Exam  Constitutional:       General: He is not in acute distress.     Appearance: Normal appearance. He is obese. He is ill-appearing.   Cardiovascular:      Rate and Rhythm: Normal rate and regular rhythm.      Heart sounds: No murmur heard.  Pulmonary:      Effort: Pulmonary effort is normal. No respiratory distress.      Breath sounds: Normal breath sounds. No wheezing.   Abdominal:      General: There is distension.      Palpations: Abdomen is soft.      Tenderness: There is no abdominal tenderness.   Musculoskeletal:         General: Swelling (Left knee) present.      Left lower leg: Edema (Trace LLE edema) present.   Neurological:      Mental Status: He is alert.           Significant Labs: All pertinent labs within the past 24 hours have been reviewed.  CBC:   Recent Labs   Lab 05/13/23  0638 05/14/23  0706   WBC 8.14 9.63   HGB 7.8* 8.4*   HCT 26.0* 26.7*    216     CMP:   Recent Labs   Lab 05/13/23  0638 05/14/23  0706    139   K 5.0 5.4*    106   CO2 18* 17*   * 130*   BUN 96* 93*   CREATININE 2.7* 2.6*   CALCIUM 9.0 8.1*   PROT 6.7  --    ALBUMIN 2.8*  --    BILITOT 0.4  --    ALKPHOS 65  --    AST 34  --    ALT 26  --    ANIONGAP 13 16       Significant Imaging: I have reviewed all pertinent imaging results/findings within the past 24 hours.    X-Ray Chest 1 View   Final Result      Findings of mild pulmonary edema with a small right-sided pleural effusion.         Electronically signed by: Carlos Flowers Jr., MD   Date:    05/14/2023    Time:    12:27      CT Knee Without Contrast Left   Final Result      No evidence of fracture or other acute osseous abnormality.  Small to moderate joint effusion.  Chondrocalcinosis.      All CT scans at this facility are performed  using dose modulation techniques as appropriate to performed exam including the following:  automated exposure control; adjustment of mA and/or kV according to the patients size (this includes techniques or standardized protocols for targeted exams where dose is matched to indication/reason for exam: i.e. extremities or head);  iterative reconstruction technique.         Electronically signed by: Tahir Borja MD   Date:    05/12/2023   Time:    14:16      CT Hip Without Contrast Left   Final Result      No evidence for hip fracture or dislocation.  Senescent changes      All CT scans   are performed using dose optimization techniques including the following: automated exposure control; adjustment of the mA and/or kV; use of iterative reconstruction technique.  Dose modulation was employed for ALARA by means of: Automated exposure control; adjustment of the mA and/or kV according to patient size (this includes techniques or standardized protocols for targeted exams where dose is matched to indication/reason for exam; i.e. extremities or head); and/or use of iterative reconstructive technique.         Electronically signed by: Jack Suresh   Date:    05/10/2023   Time:    22:44      CT Lumbar Spine Without Contrast   Final Result      No acute fracture or dislocation      Extensive degenerative joint disease      Atherosclerotic disease      Spinal stimulator device.  Correlate clinically      Prior postsurgical changes including laminectomy at L4-L5 and prior posterior fixation at L2-L3.  Correlate to surgical history.      All CT scans at this facility are performed  using dose modulation techniques as appropriate to performed exam including the following:  automated exposure  control; adjustment of mA and/or kV according to the patients size (this includes techniques or standardized protocols for targeted exams where dose is matched to indication/reason for exam: i.e. extremities or head);  iterative reconstruction technique.         Electronically signed by: Jack Suresh   Date:    05/10/2023   Time:    22:47      CT Head Without Contrast   Final Result      No acute abnormality.      Atrophy and chronic white matter changes      All CT scans   are performed using dose optimization techniques including the following: automated exposure control; adjustment of the mA and/or kV; use of iterative reconstruction technique.  Dose modulation was employed for ALARA by means of: Automated exposure control; adjustment of the mA and/or kV according to patient size (this includes techniques or standardized protocols for targeted exams where dose is matched to indication/reason for exam; i.e. extremities or head); and/or use of iterative reconstructive technique.         Electronically signed by: Jack Suresh   Date:    05/10/2023   Time:    18:25      X-Ray Chest AP Portable   Final Result      As above         Electronically signed by: Jack Suresh   Date:    05/10/2023   Time:    18:02      X-Ray Knee 3 View Left   Final Result      No acute fracture or dislocation.         Electronically signed by: Jack Suresh   Date:    05/10/2023   Time:    18:05              Assessment/Plan:      * Acute renal failure superimposed on stage 3 chronic kidney disease  Patient found to have VAIBHAV superimposed on underlying CKD stage 3 with creatinine/GFR currently measuring 3.5/17 with last reported baseline measuring 1.9/40.  Patient is noted to be on multiple antihypertensive medications known to be nephrotoxic.  Plan:  -Avoid nephrotoxins (holding losartan and spironolactone)  -UA with trace protein  -Patient's wife reports Lasix dose was increased this past week; patient states he hasn't noted improvement  in abdominal distention or LE edema  -Strict I/O's, renally dose mediations, AM labs    -BUN/Cr trending down short course of IV fluids  -Diuretics currently on hold  -Nephrology following    5/14  -CXR today mild pulm edema and small right pleural effusion  -Lasix 20 mg PO started today  -AM labs  -Nephrology following    Chronic combined systolic and diastolic congestive heart failure  Patient is identified as having Combined Systolic and Diastolic heart failure that is Acute on chronic. CHF is currently controlled and uncontrolled due to Continued edema of extremities. Latest ECHO performed and demonstrates- Results for orders placed during the hospital encounter of 04/28/22    Echo    Interpretation Summary  · The left ventricle is normal in size with concentric hypertrophy and moderately decreased systolic function.  · The estimated ejection fraction is 35%.  · Grade III left ventricular diastolic dysfunction.  · Mild right ventricular enlargement with moderately reduced right ventricular systolic function.  · Mild left atrial enlargement.  · Mild right atrial enlargement.  · There is mild aortic valve stenosis.  · Aortic valve area is 1.53 cm2; peak velocity is 1.78 m/s; mean gradient is 7 mmHg.  · Mild mitral regurgitation.  · Mild tricuspid regurgitation.  · Elevated central venous pressure (15 mmHg).  · The estimated PA systolic pressure is 37 mmHg.  · There is abnormal septal wall motion. There is systolic and diastolic flattening of the interventricular septum consistent with right ventricle pressure and volume overload.  · There is moderate left ventricular global hypokinesis.  . Continue Beta Blocker, ACE/ARB, Furosemide and Aldactone and monitor clinical status closely. Monitor on telemetry. Patient is off CHF pathway.  Monitor strict Is&Os and daily weights.  Place on fluid restriction of 1.5 L. Continue to stress to patient importance of self efficacy and  on diet for CHF. Last BNP reviewed-  and noted below   Recent Labs   Lab 05/12/23  0632   *     TTE with DD and LVEF 50%  Telemetry monitoring    Lasix restarted today (5/14) at lower dose - 20 mg PO q day  Strict I/O's, Na/fluid restriction  Cardiology following    Acute pain of left knee  Presented on admission with c/o left knee pain after a fall  Noted swelling, XR with no fracture  CT with small to moderate effusion  Orthopedics consulted, no aspiration needed at this time, appreciate assitance  Pain control, compressive wrap, ice if needed    Symptomatic anemia  Acute on chronic macrocytic anemia  Previous GI workup with had capsule endoscopy, EGD, c-scope [id number]  Likely contributing to his fatigue/weakness  Patient does reports dark, tarry stools    Fecal occult blood+ (5/11)  Hold Eliqius, last dose 5/12  Transfuse total 2 units PRBCs this admission - 5/12, 5/13  Consider inpatient GI consult if needed vs outpatient follow up    5/14  -H/H 8.4/26.7 this AM  -Hold eliquis, possibly restart tomorrow if H/H stable    Weakness of both lower extremities  Patient presented with worsening bilateral lower extremity weakness.    CT lumbar spine and hips/pelvis showed degenerative disease.    Patient without evidence of bowel/bladder incontinence, lumbar back pain, hip pain, and has 5/5 strength throughout with the exception of 4/5 strength upon left leg raise.    Sensation to light touch grossly intact throughout.    Unable to obtain MRI lumbar spine due to incompatible PM  PT/OT following, able to ambulate some, SNF recommended    5/13  Wife with concerns that spinal stimulator is malfunctioning but patient reports pain in back is controlled, weakness improving.    Atrial fibrillation with chronic anticoagulation with Eliquis  Patient with Paroxysmal (<7 days) atrial fibrillation which is controlled currently with Beta Blocker. Patient is currently in sinus rhythm.[id number] Score: 4. Anticoagulation indicated. Anticoagulation done with  Eliquis.    Eliquis held, last dose 5/12 PM  Restart when appropriate    CAD (coronary artery disease)  Patient currently follows Dr. Hood and is currently without chest pain or shortness of breath. Troponin was noted to be elevated at 0.250 with repeat 0.239 in absence of chest pain.  EKG showed paced rhythm.  Elevation likely secondary to VAIBHAV but will continue to monitor to rule out ACS.  Current hemoglobin measuring 7.8 and is below threshold for transfusion in known cardiac patient.  Will type/strain and prepare 1 unit but hold transfusion following repeat H/H this morning.  Plan:  -telemetry  -trend troponin  -f/u labs  -type/screen, transfuse if repeat hemoglobin less than 8  -serial EKGs p.r.n. chest pain  -continue home medications, titrate as needed  -RAMIREZ therapy p.r.n.    -Cardiology following    5/13/23  -Additional 1 unit PRBC today, monitor H/H  -Hold eliquis    5/14/23  -H/H 8.4/26.7 this AM  -Hold eliquis, possibly restart tomorrow if H/H stable    Mixed restrictive and obstructive lung disease  Not presently in acute exacerbation and is without signs/symptoms of distress. Patient currently saturating  100% on room air.  Plan:  -Continue home medications, titrate as needed  -Titrate oxygen requirements as needed  -Incentive spirometry   -Monitor pulse oximetry  -Duonebs prn     Hypertension  Currently normotensive. BP currently ranging from  systolic.  Plan:  -Optimize pain control   -Continue home medications, titrate as needed   -Monitor BP  -Low salt/cardiac diet when not NPO  -IV hydralazine prn for SBP>160 or DBP>90       5/12  -Continue to hold home losartan and spironolactone due to acute on CKD  -Resume when appropriate     Hyperlipemia  Patient is chronically on statin.will continue for now. Last Lipid Panel:   Lab Results   Component Value Date    CHOL 124 07/14/2022    HDL 43 07/14/2022    LDLCALC 54.4 (L) 07/14/2022    TRIG 133 07/14/2022    CHOLHDL 34.7 07/14/2022    Plan:  -Continue home medication  -low fat/low calorie diet    Obstructive sleep apnea  Currently on CPAP palpation.  Plan:  -continue home CPAP    Gastroesophageal reflux disease  Chronic. Stable. Currently asymptomatic. Home medications include PPI/Antacids as needed.  Plan:  -Continue PPI/Antacids as needed     Class 1 obesity due to excess calories with serious comorbidity and body mass index (BMI) of 32.0 to 32.9 in adult  Body mass index is 32.55 kg/m². Morbid obesity complicates all aspects of disease management from diagnostic modalities to treatment. Weight loss encouraged and health benefits explained to patient.     S/P CABG x 3        MI, old          VTE Risk Mitigation (From admission, onward)           Ordered     Reason for No Pharmacological VTE Prophylaxis  Once        Question:  Reasons:  Answer:  Already adequately anticoagulated on oral Anticoagulants    05/10/23 2324     IP VTE HIGH RISK PATIENT  Once         05/10/23 2324     Place sequential compression device  Until discontinued         05/10/23 2324                    Discharge Planning   LITTLE:      Code Status: Full Code   Is the patient medically ready for discharge?:     Reason for patient still in hospital (select all that apply): Patient trending condition, Laboratory test, Treatment, Consult recommendations, PT / OT recommendations and Pending disposition  Discharge Plan A: Home with family                  Bobby Garcia MD  Department of Hospital Medicine   [facility] - Med Surg

## 2023-05-14 NOTE — ASSESSMENT & PLAN NOTE
Patient currently follows Dr. Hood and is currently without chest pain or shortness of breath. Troponin was noted to be elevated at 0.250 with repeat 0.239 in absence of chest pain.  EKG showed paced rhythm.  Elevation likely secondary to VAIBHAV but will continue to monitor to rule out ACS.  Current hemoglobin measuring 7.8 and is below threshold for transfusion in known cardiac patient.  Will type/strain and prepare 1 unit but hold transfusion following repeat H/H this morning.  Plan:  -telemetry  -trend troponin  -f/u labs  -type/screen, transfuse if repeat hemoglobin less than 8  -serial EKGs p.r.n. chest pain  -continue home medications, titrate as needed  -RAMIREZ therapy p.r.n.    -Cardiology following    5/13/23  -Additional 1 unit PRBC today, monitor H/H  -Hold eliquis

## 2023-05-14 NOTE — ASSESSMENT & PLAN NOTE
Body mass index is 32.55 kg/m². Morbid obesity complicates all aspects of disease management from diagnostic modalities to treatment. Weight loss encouraged and health benefits explained to patient.

## 2023-05-14 NOTE — ASSESSMENT & PLAN NOTE
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  Patient is noted to be on multiple antihypertensive medications known to be nephrotoxic.  Plan:  -Avoid nephrotoxins (holding losartan and spironolactone)  -UA with trace protein  -Patient's wife reports Lasix dose was increased this past week; patient states he hasn't noted improvement in abdominal distention or LE edema  -Strict I/O's, renally dose mediations, AM labs    -BUN/Cr trending down short course of IV fluids  -Diuretics currently on hold  -Nephrology following    5/14  -CXR today mild pulm edema and small right pleural effusion  -Lasix 20 mg PO started today  -AM labs  -Nephrology following

## 2023-05-14 NOTE — ASSESSMENT & PLAN NOTE
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  Patient is noted to be on multiple antihypertensive medications known to be nephrotoxic.  Plan:  -Avoid nephrotoxins (holding losartan and spironolactone)  -UA with trace protein  -Patient's wife reports Lasix dose was increased this past week; patient states he hasn't noted improvement in abdominal distention or LE edema  -Strict I/O's, renally dose mediations, AM labs    -BUN/Cr trending down short course of IV fluids  -Diuretics currently on hold  -Nephrology following

## 2023-05-14 NOTE — ASSESSMENT & PLAN NOTE
Acute on chronic macrocytic anemia  Previous GI workup with had capsule endoscopy, EGD, c-scope [id number]  Likely contributing to his fatigue/weakness  Patient does reports dark, tarry stools    Fecal occult blood+ (5/11)  Hold Eliqius, last dose 5/12  Transfuse total 2 units PRBCs this admission - 5/12, 5/13  Consider inpatient GI consult if needed vs outpatient follow up    5/14  -H/H 8.4/26.7 this AM  -Hold eliquis, possibly restart tomorrow if H/H stable

## 2023-05-14 NOTE — ASSESSMENT & PLAN NOTE
Patient with Paroxysmal (<7 days) atrial fibrillation which is controlled currently with Beta Blocker. Patient is currently in sinus rhythm.[id number] Score: 4. Anticoagulation indicated. Anticoagulation done with Eliquis.    Eliquis held, last dose 5/12 PM

## 2023-05-14 NOTE — ASSESSMENT & PLAN NOTE
Patient presented with worsening bilateral lower extremity weakness.    CT lumbar spine and hips/pelvis showed degenerative disease.    Patient without evidence of bowel/bladder incontinence, lumbar back pain, hip pain, and has 5/5 strength throughout with the exception of 4/5 strength upon left leg raise.    Sensation to light touch grossly intact throughout.    Unable to obtain MRI lumbar spine due to incompatible PM  PT/OT following, able to ambulate some, SNF recommended    5/13  Wife with concerns that spinal stimulator is malfunctioning but patient reports pain in back is controlled, weakness improving.

## 2023-05-14 NOTE — PLAN OF CARE
Chart reviewed, VSS, purposeful rounding every 2 hrs, patient free of falls and injuries, bed alarm on, instructed patient to call for assistance, care is ongoing       Problem: Adult Inpatient Plan of Care  Goal: Plan of Care Review  Outcome: Ongoing, Progressing  Goal: Patient-Specific Goal (Individualized)  Outcome: Ongoing, Progressing  Goal: Absence of Hospital-Acquired Illness or Injury  Outcome: Ongoing, Progressing  Goal: Optimal Comfort and Wellbeing  Outcome: Ongoing, Progressing  Goal: Readiness for Transition of Care  Outcome: Ongoing, Progressing     Problem: Diabetes Comorbidity  Goal: Blood Glucose Level Within Targeted Range  Outcome: Ongoing, Progressing     Problem: Fluid and Electrolyte Imbalance (Acute Kidney Injury/Impairment)  Goal: Fluid and Electrolyte Balance  Outcome: Ongoing, Progressing     Problem: Oral Intake Inadequate (Acute Kidney Injury/Impairment)  Goal: Optimal Nutrition Intake  Outcome: Ongoing, Progressing     Problem: Renal Function Impairment (Acute Kidney Injury/Impairment)  Goal: Effective Renal Function  Outcome: Ongoing, Progressing     Problem: Skin Injury Risk Increased  Goal: Skin Health and Integrity  Outcome: Ongoing, Progressing

## 2023-05-14 NOTE — ASSESSMENT & PLAN NOTE
Acute on chronic macrocytic anemia  Previous GI workup with had capsule endoscopy, EGD, c-scope [id number]  Likely contributing to his fatigue/weakness  Patient does reports dark, tarry stools    Fecal occult blood+ (5/11)  Hold Eliqius, last dose 5/12  Transfuse total 2 units PRBCs this admission - 5/12, 5/13  Will consider GI consult

## 2023-05-14 NOTE — ASSESSMENT & PLAN NOTE
Patient presented with worsening bilateral lower extremity weakness.    CT lumbar spine and hips/pelvis showed degenerative disease.    Patient without evidence of bowel/bladder incontinence, lumbar back pain, hip pain, and has 5/5 strength throughout with the exception of 4/5 strength upon left leg raise.    Sensation to light touch grossly intact throughout.    Unable to obtain MRI lumbar spine due to incompatible PM  PT/OT following, able to ambulate some, SNF recommended    5/13  Wife with concerns that spinal stimulator is malfunctioning, patient reports pain in back is controlled, weakness improving

## 2023-05-14 NOTE — ASSESSMENT & PLAN NOTE
Patient is identified as having Combined Systolic and Diastolic heart failure that is Acute on chronic. CHF is currently controlled and uncontrolled due to Continued edema of extremities. Latest ECHO performed and demonstrates- Results for orders placed during the hospital encounter of 04/28/22    Echo    Interpretation Summary  · The left ventricle is normal in size with concentric hypertrophy and moderately decreased systolic function.  · The estimated ejection fraction is 35%.  · Grade III left ventricular diastolic dysfunction.  · Mild right ventricular enlargement with moderately reduced right ventricular systolic function.  · Mild left atrial enlargement.  · Mild right atrial enlargement.  · There is mild aortic valve stenosis.  · Aortic valve area is 1.53 cm2; peak velocity is 1.78 m/s; mean gradient is 7 mmHg.  · Mild mitral regurgitation.  · Mild tricuspid regurgitation.  · Elevated central venous pressure (15 mmHg).  · The estimated PA systolic pressure is 37 mmHg.  · There is abnormal septal wall motion. There is systolic and diastolic flattening of the interventricular septum consistent with right ventricle pressure and volume overload.  · There is moderate left ventricular global hypokinesis.  . Continue Beta Blocker, ACE/ARB, Furosemide and Aldactone and monitor clinical status closely. Monitor on telemetry. Patient is off CHF pathway.  Monitor strict Is&Os and daily weights.  Place on fluid restriction of 1.5 L. Continue to stress to patient importance of self efficacy and  on diet for CHF. Last BNP reviewed- and noted below   Recent Labs   Lab 05/12/23  0632   *     TTE with DD and LVEF 50%  Telemetry monitoring    Lasix restarted today (5/14) at lower dose - 20 mg PO q day  Strict I/O's, Na/fluid restriction  Cardiology following

## 2023-05-14 NOTE — ASSESSMENT & PLAN NOTE
83 y/o male presented with leg weakness after lasix dose was recently increased:     Chronic combined systolic and diastolic congestive heart failure  Fluid status stable at present  Pt presented first with overdiuresis (lasix 80 mg bid), then was given IVF's  IVF's now off  Expect will need to start diuretics again soon, but at a lower dose, 20 mg po qd to bid     * Acute renal failure superimposed on stage 3 chronic kidney disease  s Cr stable, no significant change  VAIBHAV is mild, likely due to low BP and recent diarrhea; also from overdiuresis  s Cr in the past tended to fluctuate.  CKD stage 3 at baseline.       HTN: BP was low on admit, likely due to overdiuresis and diarrhea  Hypotension has resolved. BP normal today     H/o of DM-2: reviewed the chart     Symptomatic anemia  Some of the symptoms related to leg crams may be due to iron deficiency anemia  Very low iron sats  Prior endoscopies in 2020 unremarkable  Anemia likely multifactorial: iron loss due to eliquis + CKD + poor diet     Recommend:  Retacrit/epogen   IV iron replacement: venofer 200 mg IV q d x 5  Consider PRBC transfusion  Consider consult GI        Plans and recommendations:  As discussed above  Total time spent 45 minutes including time needed to review the records, the   patient evaluation, documentation, face-to-face discussion with the patient,   more than 50% of the time was spent on coordination of care and counseling.

## 2023-05-14 NOTE — SUBJECTIVE & OBJECTIVE
Interval History: Pt was seen and examined. Labs and meds reviewed. Discussed with other providers. No new c/o's, still not SOB, lag swelling has not returned yet.    Review of patient's allergies indicates:  No Known Allergies  Current Facility-Administered Medications   Medication Frequency    0.9%  NaCl infusion (for blood administration) Q24H PRN    0.9%  NaCl infusion (for blood administration) Q24H PRN    acetaminophen suppository 650 mg Q6H PRN    acetaminophen tablet 650 mg Q8H PRN    aluminum-magnesium hydroxide-simethicone 200-200-20 mg/5 mL suspension 30 mL QID PRN    atorvastatin tablet 80 mg QHS    clonazePAM tablet 1 mg Nightly PRN    dextrose 10% bolus 125 mL 125 mL PRN    dextrose 10% bolus 250 mL 250 mL PRN    dextrose 40 % gel 15,000 mg PRN    dextrose 40 % gel 30,000 mg PRN    epoetin niraj-epbx injection 5,000 Units Q7 Days    [START ON 5/15/2023] furosemide tablet 20 mg Daily    glucagon (human recombinant) injection 1 mg PRN    HYDROcodone-acetaminophen 5-325 mg per tablet 1 tablet Q6H PRN    iron sucrose injection 200 mg Daily    loperamide capsule 2 mg QID PRN    melatonin tablet 6 mg Nightly PRN    morphine injection 2 mg Q4H PRN    naloxone 0.4 mg/mL injection 0.02 mg PRN    ondansetron injection 4 mg Q8H PRN    pantoprazole EC tablet 40 mg Daily    potassium chloride SA CR tablet 20 mEq Daily    pregabalin capsule 75 mg BID    promethazine tablet 25 mg Q6H PRN    senna-docusate 8.6-50 mg per tablet 1 tablet BID PRN    sodium chloride 0.9% flush 3 mL Q12H PRN     Facility-Administered Medications Ordered in Other Encounters   Medication Frequency    ondansetron injection 4 mg Once PRN       Objective:     Vital Signs (Most Recent):  Temp: 98.2 °F (36.8 °C) (05/14/23 1339)  Pulse: 61 (05/14/23 1339)  Resp: 18 (05/14/23 1339)  BP: (!) 110/53 (05/14/23 1339)  SpO2: 96 % (05/14/23 1339) Vital Signs (24h Range):  Temp:  [97.9 °F (36.6 °C)-99.9 °F (37.7 °C)] 98.2 °F (36.8 °C)  Pulse:  [60-73]  61  Resp:  [12-20] 18  SpO2:  [95 %-96 %] 96 %  BP: (110-129)/(53-73) 110/53     Weight: 97.1 kg (214 lb 1.1 oz) (05/14/23 0349)  Body mass index is 32.55 kg/m².  Body surface area is 2.16 meters squared.    I/O last 3 completed shifts:  In: 6604.2 [Blood:6604.2]  Out: -      Physical Exam  Vitals and nursing note reviewed.   Constitutional:       Appearance: Normal appearance.   Cardiovascular:      Rate and Rhythm: Normal rate and regular rhythm.      Pulses: Normal pulses.      Heart sounds: Normal heart sounds.   Pulmonary:      Effort: Pulmonary effort is normal.      Breath sounds: No rales.   Musculoskeletal:      Right lower leg: No edema.      Left lower leg: No edema.   Neurological:      Mental Status: He is alert and oriented to person, place, and time.   Psychiatric:         Behavior: Behavior normal.        Significant Labs: reviewed  BMP  Lab Results   Component Value Date     05/14/2023    K 5.4 (H) 05/14/2023     05/14/2023    CO2 17 (L) 05/14/2023    BUN 93 (H) 05/14/2023    CREATININE 2.6 (H) 05/14/2023    CALCIUM 8.1 (L) 05/14/2023    ANIONGAP 16 05/14/2023    EGFRNORACEVR 24 (A) 05/14/2023       Significant Imaging: XR reviewed, mild pulmonary edema

## 2023-05-14 NOTE — SUBJECTIVE & OBJECTIVE
Interval History:       Review of Systems   All other systems reviewed and are negative.  Objective:     Vital Signs (Most Recent):  Temp: 97.9 °F (36.6 °C) (05/13/23 1635)  Pulse: 67 (05/13/23 1635)  Resp: 16 (05/13/23 1635)  BP: 115/73 (05/13/23 1635)  SpO2: 96 % (05/13/23 1635) Vital Signs (24h Range):  Temp:  [97.9 °F (36.6 °C)-99.7 °F (37.6 °C)] 97.9 °F (36.6 °C)  Pulse:  [58-71] 67  Resp:  [12-18] 16  SpO2:  [95 %-96 %] 96 %  BP: (114-127)/(57-73) 115/73     Weight: 97.9 kg (215 lb 13.3 oz)  Body mass index is 32.82 kg/m².    Intake/Output Summary (Last 24 hours) at 5/13/2023 1941  Last data filed at 5/13/2023 1530  Gross per 24 hour   Intake 6604.17 ml   Output --   Net 6604.17 ml         Physical Exam  Constitutional:       General: He is not in acute distress.     Appearance: Normal appearance. He is obese. He is ill-appearing.   Cardiovascular:      Rate and Rhythm: Normal rate and regular rhythm.      Heart sounds: No murmur heard.  Pulmonary:      Effort: Pulmonary effort is normal. No respiratory distress.      Breath sounds: Normal breath sounds. No wheezing.   Abdominal:      General: There is no distension.      Palpations: Abdomen is soft.      Tenderness: There is no abdominal tenderness.   Musculoskeletal:         General: Swelling (Left knee) present.   Neurological:      Mental Status: He is alert.           Significant Labs: All pertinent labs within the past 24 hours have been reviewed.  CBC:   Recent Labs   Lab 05/12/23  0634 05/13/23  0638   WBC 8.60 8.14   HGB 8.0* 7.8*   HCT 25.4* 26.0*    190     CMP:   Recent Labs   Lab 05/12/23  0634 05/13/23  0638    137   K 4.2 5.0    106   CO2 19* 18*   * 132*   BUN 99* 96*   CREATININE 2.9* 2.7*   CALCIUM 8.8 9.0   PROT 6.7 6.7   ALBUMIN 2.9* 2.8*   BILITOT 0.4 0.4   ALKPHOS 63 65   AST 33 34   ALT 25 26   ANIONGAP 14 13       Significant Imaging: I have reviewed all pertinent imaging results/findings within the past 24  hours.    CT Knee Without Contrast Left   Final Result      No evidence of fracture or other acute osseous abnormality.  Small to moderate joint effusion.  Chondrocalcinosis.      All CT scans at this facility are performed  using dose modulation techniques as appropriate to performed exam including the following:  automated exposure control; adjustment of mA and/or kV according to the patients size (this includes techniques or standardized protocols for targeted exams where dose is matched to indication/reason for exam: i.e. extremities or head);  iterative reconstruction technique.         Electronically signed by: Tahir Borja MD   Date:    05/12/2023   Time:    14:16      CT Hip Without Contrast Left   Final Result      No evidence for hip fracture or dislocation.  Senescent changes      All CT scans   are performed using dose optimization techniques including the following: automated exposure control; adjustment of the mA and/or kV; use of iterative reconstruction technique.  Dose modulation was employed for ALARA by means of: Automated exposure control; adjustment of the mA and/or kV according to patient size (this includes techniques or standardized protocols for targeted exams where dose is matched to indication/reason for exam; i.e. extremities or head); and/or use of iterative reconstructive technique.         Electronically signed by: Jack Suresh   Date:    05/10/2023   Time:    22:44      CT Lumbar Spine Without Contrast   Final Result      No acute fracture or dislocation      Extensive degenerative joint disease      Atherosclerotic disease      Spinal stimulator device.  Correlate clinically      Prior postsurgical changes including laminectomy at L4-L5 and prior posterior fixation at L2-L3.  Correlate to surgical history.      All CT scans at this facility are performed  using dose modulation techniques as appropriate to performed exam including the following:  automated exposure control;  adjustment of mA and/or kV according to the patients size (this includes techniques or standardized protocols for targeted exams where dose is matched to indication/reason for exam: i.e. extremities or head);  iterative reconstruction technique.         Electronically signed by: Jack Suresh   Date:    05/10/2023   Time:    22:47      CT Head Without Contrast   Final Result      No acute abnormality.      Atrophy and chronic white matter changes      All CT scans   are performed using dose optimization techniques including the following: automated exposure control; adjustment of the mA and/or kV; use of iterative reconstruction technique.  Dose modulation was employed for ALARA by means of: Automated exposure control; adjustment of the mA and/or kV according to patient size (this includes techniques or standardized protocols for targeted exams where dose is matched to indication/reason for exam; i.e. extremities or head); and/or use of iterative reconstructive technique.         Electronically signed by: Jack Suresh   Date:    05/10/2023   Time:    18:25      X-Ray Chest AP Portable   Final Result      As above         Electronically signed by: Jack Suresh   Date:    05/10/2023   Time:    18:02      X-Ray Knee 3 View Left   Final Result      No acute fracture or dislocation.         Electronically signed by: Jack Suresh   Date:    05/10/2023   Time:    18:05

## 2023-05-14 NOTE — PROGRESS NOTES
[facility] Medicine  Progress Note    Patient Name: Jake Ortiz  MRN: [id number]  Patient Class: OP- Observation   Admission Date: 5/10/2023  Length of Stay: 1 days  Attending Physician: Bobby Garcia MD  Primary Care Provider: Byron Stevens MD        Subjective:     Principal Problem:Acute renal failure superimposed on stage 3 chronic kidney disease        HPI:  Jake Ortiz is a 82 y.o. 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 who presented to the ED for worsening bilateral lower extremity weakness x2 days duration.  Patient reported upon waking earlier this morning, his legs gave out causing him to fall on his left knee has been unable to walk/stand since then.  Patient denied endorsing any head trauma, loss of consciousness, or sustaining any other injuries in his only complaining of left knee pain described as hurting/sore in nature, constant, currently rated 5/10 in severity with aggravating factors including weight-bearing, movement, and palpation to the affected area.  He denied endorsing any lower back pain, hip pain, numbness/tingling of his lower extremities, bowel/bladder incontinence, and reported all other review of systems negative except as noted above.  Of note, patient did have his nerve stimulator manipulated yesterday and reported being in his usual state of health prior to onset of symptoms.  Initial workup in the ED revealed CT lumbar spine and hip positive for degenerative disease but negative for evidence of fractures or dislocations.  Ortho/spine was  consulted by ED staff rolling further imaging via MRI with recommendations to admit to hospital medicine for continued medical management and treatment/workup of metabolic causes as well as PT/OT evaluation inpatient will be evaluated in the morning.      PCP: Byron Stevens        Overview/Hospital Course:  5/11/23 NAEON. Presented yesterday for weakness Wife at bedside, provides history.  Patient reportedly fell onto his left knee, c/o left knee pain, +ROM  Has been having trouble to stand and ambulate, fell several times per wife    Wife reports abdominal distended, BLE edema; recently has Lasix dose increased but not improvement  Patient with CHF, noted elevated BNP. Check TTE, Lasix IV, strict I/O's  H/H also trending down, known chronic anemia  Transfuse 1 unit PRBC today for symptmoatic anemia  Imodium given for diarrhea this AM, no further episodes since    5/12/23  NAEON. Patient reports fatigue, left knee pain, swelling; CT ordered  Cr 3.5 --> 2.9, received course of IV fluids overnight  H/H 8/25.4 after 1 unit PRBC yesterday  Fecal occult+, patient does reports dark, tarry stools    5/13/23 NAEON. Reports weakness, fatigue mild improvement c/o left knee pain  CT with small to moderate left knee effusion, consulted Orthopedics  Cr 2.7, Hg 7.8, discussed with Nephrology, 1 unit PRBC transfusion today      Interval History:       Review of Systems   All other systems reviewed and are negative.  Objective:     Vital Signs (Most Recent):  Temp: 97.9 °F (36.6 °C) (05/13/23 1635)  Pulse: 67 (05/13/23 1635)  Resp: 16 (05/13/23 1635)  BP: 115/73 (05/13/23 1635)  SpO2: 96 % (05/13/23 1635) Vital Signs (24h Range):  Temp:  [97.9 °F (36.6 °C)-99.7 °F (37.6 °C)] 97.9 °F (36.6 °C)  Pulse:  [58-71] 67  Resp:  [12-18] 16  SpO2:  [95 %-96 %] 96 %  BP: (114-127)/(57-73) 115/73     Weight: 97.9 kg (215 lb 13.3 oz)  Body mass index is 32.82 kg/m².    Intake/Output Summary (Last 24 hours) at 5/13/2023 1941  Last data filed  at 5/13/2023 1530  Gross per 24 hour   Intake 6604.17 ml   Output --   Net 6604.17 ml         Physical Exam  Constitutional:       General: He is not in acute distress.     Appearance: Normal appearance. He is obese. He is ill-appearing.   Cardiovascular:      Rate and Rhythm: Normal rate and regular rhythm.      Heart sounds: No murmur heard.  Pulmonary:      Effort: Pulmonary effort is normal. No respiratory distress.      Breath sounds: Normal breath sounds. No wheezing.   Abdominal:      General: There is no distension.      Palpations: Abdomen is soft.      Tenderness: There is no abdominal tenderness.   Musculoskeletal:         General: Swelling (Left knee) present.   Neurological:      Mental Status: He is alert.           Significant Labs: All pertinent labs within the past 24 hours have been reviewed.  CBC:   Recent Labs   Lab 05/12/23  0634 05/13/23  0638   WBC 8.60 8.14   HGB 8.0* 7.8*   HCT 25.4* 26.0*    190     CMP:   Recent Labs   Lab 05/12/23  0634 05/13/23  0638    137   K 4.2 5.0    106   CO2 19* 18*   * 132*   BUN 99* 96*   CREATININE 2.9* 2.7*   CALCIUM 8.8 9.0   PROT 6.7 6.7   ALBUMIN 2.9* 2.8*   BILITOT 0.4 0.4   ALKPHOS 63 65   AST 33 34   ALT 25 26   ANIONGAP 14 13       Significant Imaging: I have reviewed all pertinent imaging results/findings within the past 24 hours.    CT Knee Without Contrast Left   Final Result      No evidence of fracture or other acute osseous abnormality.  Small to moderate joint effusion.  Chondrocalcinosis.      All CT scans at this facility are performed  using dose modulation techniques as appropriate to performed exam including the following:  automated exposure control; adjustment of mA and/or kV according to the patients size (this includes techniques or standardized protocols for targeted exams where dose is matched to indication/reason for exam: i.e. extremities or head);  iterative reconstruction technique.         Electronically  signed by: Tahir Borja MD   Date:    05/12/2023   Time:    14:16      CT Hip Without Contrast Left   Final Result      No evidence for hip fracture or dislocation.  Senescent changes      All CT scans   are performed using dose optimization techniques including the following: automated exposure control; adjustment of the mA and/or kV; use of iterative reconstruction technique.  Dose modulation was employed for ALARA by means of: Automated exposure control; adjustment of the mA and/or kV according to patient size (this includes techniques or standardized protocols for targeted exams where dose is matched to indication/reason for exam; i.e. extremities or head); and/or use of iterative reconstructive technique.         Electronically signed by: Jack Suresh   Date:    05/10/2023   Time:    22:44      CT Lumbar Spine Without Contrast   Final Result      No acute fracture or dislocation      Extensive degenerative joint disease      Atherosclerotic disease      Spinal stimulator device.  Correlate clinically      Prior postsurgical changes including laminectomy at L4-L5 and prior posterior fixation at L2-L3.  Correlate to surgical history.      All CT scans at this facility are performed  using dose modulation techniques as appropriate to performed exam including the following:  automated exposure control; adjustment of mA and/or kV according to the patients size (this includes techniques or standardized protocols for targeted exams where dose is matched to indication/reason for exam: i.e. extremities or head);  iterative reconstruction technique.         Electronically signed by: Jack Suresh   Date:    05/10/2023   Time:    22:47      CT Head Without Contrast   Final Result      No acute abnormality.      Atrophy and chronic white matter changes      All CT scans   are performed using dose optimization techniques including the following: automated exposure control; adjustment of the mA and/or kV; use of iterative  reconstruction technique.  Dose modulation was employed for ALARA by means of: Automated exposure control; adjustment of the mA and/or kV according to patient size (this includes techniques or standardized protocols for targeted exams where dose is matched to indication/reason for exam; i.e. extremities or head); and/or use of iterative reconstructive technique.         Electronically signed by: Jack Suresh   Date:    05/10/2023   Time:    18:25      X-Ray Chest AP Portable   Final Result      As above         Electronically signed by: Jack Suresh   Date:    05/10/2023   Time:    18:02      X-Ray Knee 3 View Left   Final Result      No acute fracture or dislocation.         Electronically signed by: Jack Suresh   Date:    05/10/2023   Time:    18:05              Assessment/Plan:      * Acute renal failure superimposed on stage 3 chronic kidney disease  Patient found to have VAIBHAV superimposed on underlying CKD stage 3 with creatinine/GFR currently measuring 3.5/17 with last reported baseline measuring 1.9/40.  Patient is noted to be on multiple antihypertensive medications known to be nephrotoxic.  Plan:  -Avoid nephrotoxins (holding losartan and spironolactone)  -UA with trace protein  -Patient's wife reports Lasix dose was increased this past week; patient states he hasn't noted improvement in abdominal distention or LE edema  -Strict I/O's, renally dose mediations, AM labs    -BUN/Cr trending down short course of IV fluids  -Diuretics currently on hold  -Nephrology following      Chronic combined systolic and diastolic congestive heart failure  Patient is identified as having Combined Systolic and Diastolic heart failure that is Acute on chronic. CHF is currently controlled and uncontrolled due to Continued edema of extremities. Latest ECHO performed and demonstrates- Results for orders placed during the hospital encounter of 04/28/22    Echo    Interpretation Summary  · The left ventricle is normal in size  with concentric hypertrophy and moderately decreased systolic function.  · The estimated ejection fraction is 35%.  · Grade III left ventricular diastolic dysfunction.  · Mild right ventricular enlargement with moderately reduced right ventricular systolic function.  · Mild left atrial enlargement.  · Mild right atrial enlargement.  · There is mild aortic valve stenosis.  · Aortic valve area is 1.53 cm2; peak velocity is 1.78 m/s; mean gradient is 7 mmHg.  · Mild mitral regurgitation.  · Mild tricuspid regurgitation.  · Elevated central venous pressure (15 mmHg).  · The estimated PA systolic pressure is 37 mmHg.  · There is abnormal septal wall motion. There is systolic and diastolic flattening of the interventricular septum consistent with right ventricle pressure and volume overload.  · There is moderate left ventricular global hypokinesis.  . Continue Beta Blocker, ACE/ARB, Furosemide and Aldactone and monitor clinical status closely. Monitor on telemetry. Patient is off CHF pathway.  Monitor strict Is&Os and daily weights.  Place on fluid restriction of 1.5 L. Continue to stress to patient importance of self efficacy and  on diet for CHF. Last BNP reviewed- and noted below   Recent Labs   Lab 05/12/23  0632   *     TTE with DD and LVEF 50%  Telemetry monitoring  Holding diuretics due to acute on CKD  Strict I/O's, Na/fluid restriction  Cardiology following    Acute pain of left knee  Presented on admission with c/o left knee pain after a fall  Noted swelling, XR with no fracture  CT with small to moderate effusion  Orthopedics consulted, no aspiration needed at this time    Symptomatic anemia  Acute on chronic macrocytic anemia  Previous GI workup with had capsule endoscopy, EGD, c-scope [id number]  Likely contributing to his fatigue/weakness  Patient does reports dark, tarry stools    Fecal occult blood+ (5/11)  Hold Eliqius, last dose 5/12  Transfuse total 2 units PRBCs this admission - 5/12,  5/13  Will consider GI consult    Weakness of both lower extremities  Patient presented with worsening bilateral lower extremity weakness.    CT lumbar spine and hips/pelvis showed degenerative disease.    Patient without evidence of bowel/bladder incontinence, lumbar back pain, hip pain, and has 5/5 strength throughout with the exception of 4/5 strength upon left leg raise.    Sensation to light touch grossly intact throughout.    Unable to obtain MRI lumbar spine due to incompatible PM  PT/OT following, able to ambulate some, SNF recommended    5/13  Wife with concerns that spinal stimulator is malfunctioning, patient reports pain in back is controlled, weakness improving    Atrial fibrillation with chronic anticoagulation with Eliquis  Patient with Paroxysmal (<7 days) atrial fibrillation which is controlled currently with Beta Blocker. Patient is currently in sinus rhythm.[id number] Score: 4. Anticoagulation indicated. Anticoagulation done with Eliquis.    Eliquis held, last dose 5/12 PM    CAD (coronary artery disease)  Patient currently follows Dr. Hood and is currently without chest pain or shortness of breath. Troponin was noted to be elevated at 0.250 with repeat 0.239 in absence of chest pain.  EKG showed paced rhythm.  Elevation likely secondary to VAIBHAV but will continue to monitor to rule out ACS.  Current hemoglobin measuring 7.8 and is below threshold for transfusion in known cardiac patient.  Will type/strain and prepare 1 unit but hold transfusion following repeat H/H this morning.  Plan:  -telemetry  -trend troponin  -f/u labs  -type/screen, transfuse if repeat hemoglobin less than 8  -serial EKGs p.r.n. chest pain  -continue home medications, titrate as needed  -RAMIREZ therapy p.r.n.    -Cardiology following    5/13/23  -Additional 1 unit PRBC today, monitor H/H  -Hold eliquis    Mixed restrictive and obstructive lung disease  Not presently in acute exacerbation and is without signs/symptoms of  distress. Patient currently saturating  100% on room air.  Plan:  -Continue home medications, titrate as needed  -Titrate oxygen requirements as needed  -Incentive spirometry   -Monitor pulse oximetry  -Duonebs prn     Hypertension  Currently normotensive. BP currently ranging from  systolic.  Plan:  -Optimize pain control   -Continue home medications, titrate as needed   -Monitor BP  -Low salt/cardiac diet when not NPO  -IV hydralazine prn for SBP>160 or DBP>90       5/12  -Continue to hold home losartan and spironolactone due to acute on CKD  -Resume when appropriate     Hyperlipemia  Patient is chronically on statin.will continue for now. Last Lipid Panel:   Lab Results   Component Value Date    CHOL 124 07/14/2022    HDL 43 07/14/2022    LDLCALC 54.4 (L) 07/14/2022    TRIG 133 07/14/2022    CHOLHDL 34.7 07/14/2022   Plan:  -Continue home medication  -low fat/low calorie diet    Obstructive sleep apnea  Currently on CPAP palpation.  Plan:  -continue home CPAP    Gastroesophageal reflux disease  Chronic. Stable. Currently asymptomatic. Home medications include PPI/Antacids as needed.  Plan:  -Continue PPI/Antacids as needed     Class 1 obesity due to excess calories with serious comorbidity and body mass index (BMI) of 32.0 to 32.9 in adult  Body mass index is 32.82 kg/m². Morbid obesity complicates all aspects of disease management from diagnostic modalities to treatment. Weight loss encouraged and health benefits explained to patient.     S/P CABG x 3        MI, old          VTE Risk Mitigation (From admission, onward)         Ordered     Reason for No Pharmacological VTE Prophylaxis  Once        Question:  Reasons:  Answer:  Already adequately anticoagulated on oral Anticoagulants    05/10/23 2324     IP VTE HIGH RISK PATIENT  Once         05/10/23 2324     Place sequential compression device  Until discontinued         05/10/23 2324                Discharge Planning   LITTLE:      Code Status: Full Code    Is the patient medically ready for discharge?:     Reason for patient still in hospital (select all that apply): Patient trending condition, Laboratory test, Treatment and Consult recommendations  Discharge Plan A: Home with family                  Bobby Garcia MD  Department of Hospital Medicine   [facility]

## 2023-05-15 PROBLEM — K21.9 GASTROESOPHAGEAL REFLUX DISEASE: Status: RESOLVED | Noted: 2018-03-05 | Resolved: 2023-01-01

## 2023-05-15 PROBLEM — E87.5 HYPERKALEMIA: Status: RESOLVED | Noted: 2023-01-01 | Resolved: 2023-01-01
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
## 2023-05-15 PROBLEM — G89.29 CHRONIC PAIN OF LEFT KNEE: Status: ACTIVE | Noted: 2023-01-01

## 2023-05-15 NOTE — PLAN OF CARE
[facility] - Med Surg  Discharge Reassessment    Primary Care Provider: Byron Stevens MD    Expected Discharge Date:     Reassessment (most recent)       Discharge Reassessment - 05/15/23 0900          Discharge Reassessment    Assessment Type Discharge Planning Reassessment     Did the patient's condition or plan change since previous assessment? No     Discharge Plan discussed with: Patient     Communicated LITTLE with patient/caregiver Date not available/Unable to determine     Discharge Plan A Skilled Nursing Facility                   Pending auth to [employer] [facility].

## 2023-05-15 NOTE — PLAN OF CARE
Discussed poc with pt, pt verbalized understanding    Purposeful rounding every 2hours    VS wnl  Cardiac monitoring in use, pt is NSR, tele monitor # [id number]  Fall precautions in place, remains injury free  Pain and nausea under control with PRN meds    Accurate I&Os  Bed locked at lowest position  Call light within reach    Chart check complete  Will cont with POC

## 2023-05-15 NOTE — PT/OT/SLP PROGRESS
Physical Therapy      Patient Name:  Jake Ortiz   MRN:  [id number]    09:05 a.m.  Patient eating breakfast at this time.   Will follow up at next available opportunity.

## 2023-05-15 NOTE — SUBJECTIVE & OBJECTIVE
Interval History: Pt was seen and examined. Labs and meds reviewed. Discussed with other providers. No new events. Pt denies SOB. Lasix at low dose was re-started yesterday.    Review of patient's allergies indicates:  No Known Allergies  Current Facility-Administered Medications   Medication Frequency    0.9%  NaCl infusion (for blood administration) Q24H PRN    0.9%  NaCl infusion (for blood administration) Q24H PRN    acetaminophen suppository 650 mg Q6H PRN    acetaminophen tablet 650 mg Q8H PRN    aluminum-magnesium hydroxide-simethicone 200-200-20 mg/5 mL suspension 30 mL QID PRN    atorvastatin tablet 80 mg QHS    clonazePAM tablet 1 mg Nightly PRN    dextrose 10% bolus 125 mL 125 mL PRN    dextrose 10% bolus 250 mL 250 mL PRN    dextrose 40 % gel 15,000 mg PRN    dextrose 40 % gel 30,000 mg PRN    epoetin niraj-epbx injection 5,000 Units Q7 Days    furosemide tablet 20 mg Daily    glucagon (human recombinant) injection 1 mg PRN    HYDROcodone-acetaminophen 5-325 mg per tablet 1 tablet Q6H PRN    iron sucrose injection 200 mg Daily    loperamide capsule 2 mg QID PRN    melatonin tablet 6 mg Nightly PRN    morphine injection 2 mg Q4H PRN    naloxone 0.4 mg/mL injection 0.02 mg PRN    ondansetron injection 4 mg Q8H PRN    pantoprazole EC tablet 40 mg Daily    pregabalin capsule 75 mg BID    promethazine tablet 25 mg Q6H PRN    senna-docusate 8.6-50 mg per tablet 1 tablet BID PRN    sodium chloride 0.9% flush 3 mL Q12H PRN     Facility-Administered Medications Ordered in Other Encounters   Medication Frequency    ondansetron injection 4 mg Once PRN       Objective:     Vital Signs (Most Recent):  Temp: 98.5 °F (36.9 °C) (05/15/23 0756)  Pulse: 60 (05/15/23 0756)  Resp: 18 (05/15/23 0756)  BP: 125/60 (05/15/23 0756)  SpO2: (!) 94 % (05/15/23 0756) Vital Signs (24h Range):  Temp:  [98.2 °F (36.8 °C)-100.7 °F (38.2 °C)] 98.5 °F (36.9 °C)  Pulse:  [60-69] 60  Resp:  [17-19] 18  SpO2:  [93 %-96 %] 94 %  BP:  (110-132)/(53-63) 125/60     Weight: 97.1 kg (214 lb 1.1 oz) (05/14/23 0349)  Body mass index is 32.55 kg/m².  Body surface area is 2.16 meters squared.    I/O last 3 completed shifts:  In: 480 [P.O.:480]  Out: 1100 [Urine:1100]     Physical Exam  Vitals and nursing note reviewed.   Constitutional:       Appearance: Normal appearance.   Cardiovascular:      Rate and Rhythm: Normal rate and regular rhythm.      Pulses: Normal pulses.      Heart sounds: Normal heart sounds.   Pulmonary:      Effort: Pulmonary effort is normal.      Breath sounds: No rales.   Abdominal:      Palpations: Abdomen is soft.      Tenderness: There is no abdominal tenderness.   Musculoskeletal:      Right lower leg: No edema.      Left lower leg: No edema.   Neurological:      Mental Status: He is alert and oriented to person, place, and time.   Psychiatric:         Behavior: Behavior normal.        Significant Labs: reviewed  BMP  Lab Results   Component Value Date     05/15/2023    K 4.9 05/15/2023     05/15/2023    CO2 17 (L) 05/15/2023    BUN 96 (H) 05/15/2023    CREATININE 2.5 (H) 05/15/2023    CALCIUM 8.6 (L) 05/15/2023    ANIONGAP 14 05/15/2023    EGFRNORACEVR 25 (A) 05/15/2023     Lab Results   Component Value Date    WBC 8.86 05/15/2023    HGB 8.4 (L) 05/15/2023    HCT 27.1 (L) 05/15/2023    MCV 95 05/15/2023     05/15/2023     Lab Results   Component Value Date    IRON 11 (L) 05/12/2023    TRANSFERRIN 182 (L) 05/12/2023    TIBC 269 05/12/2023    FESATURATED 4 (L) 05/12/2023            Significant Imaging: CXR reviewed from yesterday, mild pulm edema

## 2023-05-15 NOTE — PROGRESS NOTES
[facility] Medicine  Progress Note    Patient Name: Jake Ortiz  MRN: [id number]  Patient Class: IP- Inpatient   Admission Date: 5/10/2023  Length of Stay: 2 days  Attending Physician: Chano Sanz MD  Primary Care Provider: Byron Stevens MD        Subjective:     Principal Problem:Acute renal failure superimposed on stage 3 chronic kidney disease        HPI:  Jake Ortiz is a 82 y.o. 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 who presented to the ED for worsening bilateral lower extremity weakness x2 days duration.  Patient reported upon waking earlier this morning, his legs gave out causing him to fall on his left knee has been unable to walk/stand since then.  Patient denied endorsing any head trauma, loss of consciousness, or sustaining any other injuries in his only complaining of left knee pain described as hurting/sore in nature, constant, currently rated 5/10 in severity with aggravating factors including weight-bearing, movement, and palpation to the affected area.  He denied endorsing any lower back pain, hip pain, numbness/tingling of his lower extremities, bowel/bladder incontinence, and reported all other review of systems negative except as noted above.  Of note, patient did have his nerve stimulator manipulated yesterday and reported being in his usual state of health prior to onset of symptoms.  Initial workup in the ED revealed CT lumbar spine and hip positive for degenerative disease but negative for evidence of fractures or dislocations.  Ortho/spine was consulted by ED  staff rolling further imaging via MRI with recommendations to admit to hospital medicine for continued medical management and treatment/workup of metabolic causes as well as PT/OT evaluation inpatient will be evaluated in the morning.      PCP: Byron Stevens      Overview/Hospital Course:  5/11/23  NAEON. Presented yesterday for weakness Wife at bedside, provides history.  Patient reportedly fell onto his left knee, c/o left knee pain, +ROM  Has been having trouble to stand and ambulate, fell several times per wife    Wife reports abdominal distended, BLE edema; recently has Lasix dose increased but not improvement  Patient with CHF, noted elevated BNP. Check TTE, Lasix IV, strict I/O's  H/H also trending down, known chronic anemia  Transfuse 1 unit PRBC today for symptmoatic anemia  Imodium given for diarrhea this AM, no further episodes since    5/12/23  NAEON. Patient reports fatigue, left knee pain, swelling; CT ordered  Cr 3.5 --> 2.9, received course of IV fluids overnight  H/H 8/25.4 after 1 unit PRBC yesterday  Fecal occult+, patient does reports dark, tarry stools    5/13/23  NAEON. Reports weakness, fatigue mild improvement c/o left knee pain  CT with small to moderate left knee effusion, consulted Orthopedics  Cr 2.7, Hg 7.8, discussed with Nephrology, 1 unit PRBC transfusion today    5/14/23  NAEON. Patient attempting sitting at edge of bed, attempting to urinate  Patient reports weakness, KING - overall improving  CXR today with mild pulmonary edema, small right pleural effusion    Discussed with Nephrology, plans to restarted Lasix today  K 5.4, will stop PO potassium supplementation, resume when appropriate  Upated patient's wife at bedside    5/15: awaiting SNF placement. Cont lasix 20mg daily.       Interval History: No acute issues overnight.     Review of Systems   Constitutional:  Negative for fatigue and fever.   HENT:  Negative for sinus pressure.    Eyes:  Negative for visual disturbance.    Respiratory:  Negative for shortness of breath.    Cardiovascular:  Negative for chest pain.   Gastrointestinal:  Negative for nausea and vomiting.   Genitourinary:  Negative for difficulty urinating.   Musculoskeletal:  Negative for back pain.   Skin:  Negative for rash.   Neurological:  Negative for headaches.   Psychiatric/Behavioral:  Negative for confusion.    Objective:     Vital Signs (Most Recent):  Temp: 98 °F (36.7 °C) (05/15/23 1657)  Pulse: (!) 59 (05/15/23 1657)  Resp: 16 (05/15/23 1657)  BP: (!) 143/62 (05/15/23 1657)  SpO2: 96 % (05/15/23 1657) Vital Signs (24h Range):  Temp:  [98 °F (36.7 °C)-100.7 °F (38.2 °C)] 98 °F (36.7 °C)  Pulse:  [58-70] 59  Resp:  [16-20] 16  SpO2:  [93 %-96 %] 96 %  BP: (118-143)/(60-63) 143/62     Weight: 97.1 kg (214 lb 1.1 oz)  Body mass index is 32.55 kg/m².    Intake/Output Summary (Last 24 hours) at 5/15/2023 1704  Last data filed at 5/15/2023 0845  Gross per 24 hour   Intake 720 ml   Output 550 ml   Net 170 ml         Physical Exam  Constitutional:       General: He is not in acute distress.     Appearance: Normal appearance. He is obese. He is not ill-appearing.   Cardiovascular:      Rate and Rhythm: Normal rate and regular rhythm.      Heart sounds: No murmur heard.  Pulmonary:      Effort: Pulmonary effort is normal. No respiratory distress.      Breath sounds: Normal breath sounds. No wheezing.   Abdominal:      General: There is no distension.      Palpations: Abdomen is soft.      Tenderness: There is no abdominal tenderness.   Musculoskeletal:         General: Swelling (Left knee) present.      Right lower leg: No edema.      Left lower leg: Edema (Trace LLE edema) present.   Neurological:      Mental Status: He is alert.           Significant Labs: All pertinent labs within the past 24 hours have been reviewed.    Significant Imaging: I have reviewed all pertinent imaging results/findings within the past 24 hours.        Assessment/Plan:      * Acute renal  failure superimposed on stage 3 chronic kidney disease  Patient found to have VAIBHAV superimposed on underlying CKD stage 3 with creatinine/GFR currently measuring 3.5/17 with last reported baseline measuring 1.9/40.  Patient is noted to be on multiple antihypertensive medications known to be nephrotoxic.  Plan:  -Avoid nephrotoxins (holding losartan and spironolactone)  -UA with trace protein  -Patient's wife reports Lasix dose was increased this past week; patient states he hasn't noted improvement in abdominal distention or LE edema  -Strict I/O's, renally dose mediations, AM labs    -BUN/Cr trending down short course of IV fluids  -Diuretics currently on hold  -Nephrology following    5/14  -CXR today mild pulm edema and small right pleural effusion  -Lasix 20 mg PO started today  -AM labs  -Nephrology following    5/15:  Creatinine stable  Cont lasix 20mg   Cleared for dc by nephro  Awaiting snf placement     Chronic pain of left knee  Presented on admission with c/o left knee pain after a fall  Noted swelling, XR with no fracture  CT with small to moderate effusion  Orthopedics consulted, no aspiration needed at this time, appreciate assitance  Pain control, compressive wrap, ice if needed    Class 1 obesity due to excess calories with serious comorbidity and body mass index (BMI) of 32.0 to 32.9 in adult  Body mass index is 32.55 kg/m². Morbid obesity complicates all aspects of disease management from diagnostic modalities to treatment. Weight loss encouraged and health benefits explained to patient.     Weakness of both lower extremities  Patient presented with worsening bilateral lower extremity weakness.    CT lumbar spine and hips/pelvis showed degenerative disease.    Patient without evidence of bowel/bladder incontinence, lumbar back pain, hip pain, and has 5/5 strength throughout with the exception of 4/5 strength upon left leg raise.    Sensation to light touch grossly intact throughout.    Unable to  obtain MRI lumbar spine due to incompatible PM  PT/OT following, able to ambulate some, SNF recommended    5/13  Wife with concerns that spinal stimulator is malfunctioning but patient reports pain in back is controlled, weakness improving.    Symptomatic anemia  Acute on chronic macrocytic anemia  Previous GI workup with had capsule endoscopy, EGD, c-scope [id number]  Likely contributing to his fatigue/weakness  Patient does reports dark, tarry stools    Fecal occult blood+ (5/11)  Hold Eliqius, last dose 5/12  Transfuse total 2 units PRBCs this admission - 5/12, 5/13  Consider inpatient GI consult if needed vs outpatient follow up    5/14  -H/H 8.4/26.7 this AM  -Hold eliquis, possibly restart tomorrow if H/H stable    Obstructive sleep apnea  Currently on CPAP palpation.  Plan:  -continue home CPAP    Chronic combined systolic and diastolic congestive heart failure  Patient is identified as having Combined Systolic and Diastolic heart failure that is Acute on chronic. CHF is currently controlled and uncontrolled due to Continued edema of extremities. Latest ECHO performed and demonstrates- Results for orders placed during the hospital encounter of 04/28/22    Echo    Interpretation Summary  · The left ventricle is normal in size with concentric hypertrophy and moderately decreased systolic function.  · The estimated ejection fraction is 35%.  · Grade III left ventricular diastolic dysfunction.  · Mild right ventricular enlargement with moderately reduced right ventricular systolic function.  · Mild left atrial enlargement.  · Mild right atrial enlargement.  · There is mild aortic valve stenosis.  · Aortic valve area is 1.53 cm2; peak velocity is 1.78 m/s; mean gradient is 7 mmHg.  · Mild mitral regurgitation.  · Mild tricuspid regurgitation.  · Elevated central venous pressure (15 mmHg).  · The estimated PA systolic pressure is 37 mmHg.  · There is abnormal septal wall motion. There is systolic and diastolic  flattening of the interventricular septum consistent with right ventricle pressure and volume overload.  · There is moderate left ventricular global hypokinesis.  . Continue Beta Blocker, ACE/ARB, Furosemide and Aldactone and monitor clinical status closely. Monitor on telemetry. Patient is off CHF pathway.  Monitor strict Is&Os and daily weights.  Place on fluid restriction of 1.5 L. Continue to stress to patient importance of self efficacy and  on diet for CHF. Last BNP reviewed- and noted below   Recent Labs   Lab 05/12/23  0632   *     TTE with DD and LVEF 50%  Telemetry monitoring    Lasix restarted today (5/14) at lower dose - 20 mg PO q day  Strict I/O's, Na/fluid restriction  Cardiology following    Atrial fibrillation with chronic anticoagulation with Eliquis  Patient with Paroxysmal (<7 days) atrial fibrillation which is controlled currently with Beta Blocker. Patient is currently in sinus rhythm.[id number] Score: 4. Anticoagulation indicated. Anticoagulation done with Eliquis.    Eliquis held, last dose 5/12 PM  Restart when appropriate    Mixed restrictive and obstructive lung disease  Not presently in acute exacerbation and is without signs/symptoms of distress. Patient currently saturating  100% on room air.  Plan:  -Continue home medications, titrate as needed  -Titrate oxygen requirements as needed  -Incentive spirometry   -Monitor pulse oximetry  -Duonebs prn     S/P CABG x 3        MI, old        CAD (coronary artery disease)  Patient currently follows Dr. Hood and is currently without chest pain or shortness of breath. Troponin was noted to be elevated at 0.250 with repeat 0.239 in absence of chest pain.  EKG showed paced rhythm.  Elevation likely secondary to VAIBHAV but will continue to monitor to rule out ACS.  Current hemoglobin measuring 7.8 and is below threshold for transfusion in known cardiac patient.  Will type/strain and prepare 1 unit but hold transfusion following repeat  H/H this morning.  Plan:  -telemetry  -trend troponin  -f/u labs  -type/screen, transfuse if repeat hemoglobin less than 8  -serial EKGs p.r.n. chest pain  -continue home medications, titrate as needed  -RAMIREZ therapy p.r.n.    -Cardiology following    5/13/23  -Additional 1 unit PRBC today, monitor H/H  -Hold eliquis    5/14/23  -H/H 8.4/26.7 this AM  -Hold eliquis, possibly restart tomorrow if H/H stable    Hyperlipemia  Patient is chronically on statin.will continue for now. Last Lipid Panel:   Lab Results   Component Value Date    CHOL 124 07/14/2022    HDL 43 07/14/2022    LDLCALC 54.4 (L) 07/14/2022    TRIG 133 07/14/2022    CHOLHDL 34.7 07/14/2022   Plan:  -Continue home medication  -low fat/low calorie diet    Hypertension  Currently normotensive. BP currently ranging from  systolic.  Plan:  -Optimize pain control   -Continue home medications, titrate as needed   -Monitor BP  -Low salt/cardiac diet when not NPO  -IV hydralazine prn for SBP>160 or DBP>90       5/12  -Continue to hold home losartan and spironolactone due to acute on CKD  -Resume when appropriate       VTE Risk Mitigation (From admission, onward)         Ordered     Reason for No Pharmacological VTE Prophylaxis  Once        Question:  Reasons:  Answer:  Already adequately anticoagulated on oral Anticoagulants    05/10/23 2324     IP VTE HIGH RISK PATIENT  Once         05/10/23 2324     Place sequential compression device  Until discontinued         05/10/23 2324                Discharge Planning   LITTLE: 5/15/2023     Code Status: Full Code   Is the patient medically ready for discharge?:     Reason for patient still in hospital (select all that apply): Patient trending condition  Discharge Plan A: Skilled Nursing Facility                  Chano Sanz MD  Department of Hospital Medicine   [facility]

## 2023-05-15 NOTE — ASSESSMENT & PLAN NOTE
83 y/o male presented with leg weakness after lasix dose was recently increased:     Chronic combined systolic and diastolic congestive heart failure  Per CXR this am, has started to have pulmonary edema after holding lasix for a few days  Pulmonary edema is mild at present  Will re-start lasix 230 mg po qd today, so that pt will not fall behind fluid gain     To review: Pt presented first with overdiuresis (lasix 80 mg bid), then was given IVF's  IVF's now off     * Acute renal failure superimposed on stage 3 chronic kidney disease  s Cr stable, slowly returning towards prior baseline of close to 2   VAIBHAV is mild, likely due to low BP and recent diarrhea; also from overdiuresis  s Cr in the past tended to fluctuate.  All c/w pre-renal azotemia (not clinically concerning)  CKD stage 3 at baseline.       HTN: BP was low on admit, likely due to overdiuresis and diarrhea  Hypotension has improved     H/o of DM-2: reviewed the chart     Symptomatic anemia  Some of the symptoms related to leg crams may be due to iron deficiency anemia  Very low iron sats  Prior endoscopies in 2020 unremarkable  Anemia likely multifactorial: iron loss due to eliquis + CKD + poor diet     On Retacrit/epogen qw  On IV iron replacement: venofer 200 mg IV q d x 5  S/p PRBC transfusion  Consider consult GI        Plans and recommendations:  As discussed above  Total time spent 40 minutes including time needed to review the records, the   patient evaluation, documentation, face-to-face discussion with the patient,   more than 50% of the time was spent on coordination of care and counseling.

## 2023-05-15 NOTE — PROGRESS NOTES
[facility]  Orthopedics  Progress Note    Patient Name: Jake Ortiz  MRN: [id number]  Admission Date: 5/10/2023  Hospital Length of Stay: 1 days  Attending Provider: Chano Sanz MD  Primary Care Provider: Byron Stevens MD    Subjective:     Principal Problem:Acute renal failure superimposed on stage 3 chronic kidney disease    Principal Orthopedic Problem:  Left knee pain and swelling    Interval History: Jake Ortiz is an 82-year-old male admitted for acute renal failure superimposed on stage 3 chronic kidney disease who is being seen by orthopedics for left knee pain and swelling.  Patient suffered a fall prior to admission.  Of note, he has a history of lumbar spine radiculopathy and has had a spinal stimulator placed.  He reports that his pain radiates all the way down to his foot and describes pain of the upper thigh with associated weakness.    Review of patient's allergies indicates:  No Known Allergies    Current Facility-Administered Medications   Medication    0.9%  NaCl infusion (for blood administration)    0.9%  NaCl infusion (for blood administration)    acetaminophen suppository 650 mg    acetaminophen tablet 650 mg    aluminum-magnesium hydroxide-simethicone 200-200-20 mg/5 mL suspension 30 mL    atorvastatin tablet 80 mg    clonazePAM tablet 1 mg    dextrose 10% bolus 125 mL 125 mL    dextrose 10% bolus 250 mL 250 mL    dextrose 40 % gel 15,000 mg    dextrose 40 % gel 30,000 mg    epoetin niraj-epbx injection 5,000 Units    furosemide tablet 20 mg    glucagon (human recombinant) injection 1 mg    HYDROcodone-acetaminophen 5-325 mg per tablet 1 tablet    iron sucrose injection 200 mg    loperamide capsule 2 mg    melatonin tablet 6 mg    morphine injection 2 mg    naloxone 0.4 mg/mL injection 0.02 mg    ondansetron injection 4 mg    pantoprazole EC tablet 40 mg    pregabalin capsule 75 mg    promethazine tablet 25 mg    senna-docusate 8.6-50 mg per tablet 1 tablet    sodium chloride 0.9% flush  "3 mL     Facility-Administered Medications Ordered in Other Encounters   Medication    ondansetron injection 4 mg     Objective:     Vital Signs (Most Recent):  Temp: 98.2 °F (36.8 °C) (05/15/23 1208)  Pulse: 62 (05/15/23 1208)  Resp: 20 (05/15/23 1208)  BP: 131/60 (05/15/23 1208)  SpO2: 96 % (05/15/23 1208) Vital Signs (24h Range):  Temp:  [98.2 °F (36.8 °C)-100.7 °F (38.2 °C)] 98.2 °F (36.8 °C)  Pulse:  [60-69] 62  Resp:  [17-20] 20  SpO2:  [93 %-96 %] 96 %  BP: (110-132)/(53-63) 131/60     Weight: 97.1 kg (214 lb 1.1 oz)  Height: 5' 8" (172.7 cm)  Body mass index is 32.55 kg/m².      Intake/Output Summary (Last 24 hours) at 5/15/2023 1326  Last data filed at 5/15/2023 0845  Gross per 24 hour   Intake 720 ml   Output 1100 ml   Net -380 ml       Ortho/SPM Exam  Left knee:  Skin is intact   Small effusion noted   Minimal TTP on exam   ROM decreased secondary to weakness   Calf and compartments are soft and compressible  Motor exam normal   Sensation and pulses intact  Cap refill brisk     GEN: Well developed, well nourished male. AAOX3. No acute distress.   Head: Normocephalic, atraumatic.   Eyes: SHELL  Neck: Trachea is midline, no adenopathy  Resp: Breathing unlabored.  Neuro: Motor function normal, Cranial nerves intact  Psych: Mood and affect appropriate.      Significant Labs:   Recent Lab Results         05/15/23  0655        Anion Gap 14       Baso # 0.04       Basophil % 0.5       BUN 96       Calcium 8.6       Chloride 106       CO2 17       Creatinine 2.5       Differential Method Automated       eGFR 25       Eos # 0.1       Eosinophil % 0.7       Glucose 141       Gran # (ANC) 7.3       Gran % 81.9       Hematocrit 27.1       Hemoglobin 8.4       Immature Grans (Abs) 0.12  Comment: Mild elevation in immature granulocytes is non specific and   can be seen in a variety of conditions including stress response,   acute inflammation, trauma and pregnancy. Correlation with other   laboratory and clinical " findings is essential.         Immature Granulocytes 1.4       Lymph # 0.4       Lymph % 4.6       MCH 29.4       MCHC 31.0       MCV 95       Mono # 1.0       Mono % 10.9       MPV 10.6       nRBC 0       Platelets 286       Potassium 4.9       RBC 2.86       RDW 18.4       Sodium 137       WBC 8.86             All pertinent labs within the past 24 hours have been reviewed.    Significant Imaging: I have reviewed and interpreted all pertinent imaging results/findings.    Assessment/Plan:     Active Diagnoses:    Diagnosis Date Noted POA    PRINCIPAL PROBLEM:  Acute renal failure superimposed on stage 3 chronic kidney disease [N17.9, N18.30] 09/18/2020 Yes    Hyperkalemia [E87.5] 05/14/2023 No    Acute pain of left knee [M25.562] 05/13/2023 Yes    Weakness of both lower extremities [R29.898] 05/11/2023 Unknown    Class 1 obesity due to excess calories with serious comorbidity and body mass index (BMI) of 32.0 to 32.9 in adult [E66.09, Z68.32] 05/11/2023 Not Applicable    Symptomatic anemia [D64.9] 12/22/2021 Yes    Obstructive sleep apnea [G47.33] 06/05/2018 Yes    Chronic combined systolic and diastolic congestive heart failure [I50.42] 03/05/2018 Yes    Gastroesophageal reflux disease [K21.9] 03/05/2018 Yes    Atrial fibrillation with chronic anticoagulation with Eliquis [I48.91] 10/19/2017 Yes    Mixed restrictive and obstructive lung disease [J43.9, J98.4] 01/15/2015 Yes     Chronic    CAD (coronary artery disease) [I25.10] 07/23/2014 Yes    Hypertension [I10]  Yes    Hyperlipemia [E78.5]  Yes      Problems Resolved During this Admission:     Assessment:   82-year-old male with left knee pain and swelling following a fall, all imaging is negative for acute processes     Plan:  No further orthopedic intervention is indicated at this time  Patient is ready for discharge from orthopedic standpoint  He will follow-up with his back doctor for further evaluation  Offered to refer him to total joint specialist, but he  is not interested at this time     Imer Figueredo PA-C  Orthopedics  [facility]

## 2023-05-15 NOTE — PLAN OF CARE
[facility]  Discharge Final Note    Primary Care Provider: Byron Stevens MD    Expected Discharge Date: 5/15/2023    Final Discharge Note (most recent)       Final Note - 05/15/23 1502          Final Note    Assessment Type Final Discharge Note     Anticipated Discharge Disposition Skilled Nursing Facility     Hospital Resources/Appts/Education Provided Appointments scheduled and added to AVS        Post-Acute Status    Post-Acute Authorization Placement     Coverage peoples [employer]                                Contact Info       Byron Stevens MD   Specialty: Family Medicine   Relationship: PCP - General    [address] [place] [id number]   Phone: [phone]       Next Steps: Follow up

## 2023-05-15 NOTE — PT/OT/SLP PROGRESS
Physical Therapy  Treatment    Jake Ortiz   MRN: [id number]   Admitting Diagnosis: Acute renal failure superimposed on stage 3 chronic kidney disease    PT Received On: 05/15/23  PT Start Time: 1115     PT Stop Time: 1140    PT Total Time (min): 25 min       Billable Minutes:  Therapeutic Activity 15 and Therapeutic Exercise 10    Treatment Type: Treatment  PT/PTA: PTA     Number of PTA visits since last PT visit: 1       General Precautions: Standard, fall  Orthopedic Precautions: N/A  Braces: N/A  Respiratory Status: Room air    Spiritual, Cultural Beliefs, Congregational Practices, Values that Affect Care: no    Subjective:  Communicated with charge nurse, Samira, and completed Epic chart review prior to session.  Patient agreed to PT session with encouragement from therapist.     Pain/Comfort  Pain Rating 1: 8/10  Location - Side 1: Bilateral  Location 1: foot  Pain Addressed 1: Other (see comments) (ACTIVITY PACING)  Pain Rating Post-Intervention 1: 8/10    Objective:   Patient found with: telemetry, peripheral IV    Supine > sit EOB: Mod A (VC for sequencing of task; increased time to complete)    Forward scoot towards EOB: Min A     STS from EOB > RW: Max A of 2 (1/6 attempts were successful; VC for hand placement)    Posterior scoot towards center of bed: Min A     Completed x15 reps AROM TE to BLE: LAQ, Hip Flex, AP   Intermittent cues given as needed to maintain correct form during repetitions     Educated patient on importance of increased tolerance to upright position and direct impact on CV endurance and strength. Patient encouraged to utilize elevated HOB/ supported sitting EOB to simulate chair position until able to safely complete chair T/F. Patient given a minimum goal of majority of the day to be spent in upright, especially with all meals. Encouraged patient to perform AROM TE to BLE throughout the day within all available planes of motion. Re enforced importance of utilizing call light to meet needs  in room and not attempt to get up without staff assistance. Patient verbalized understanding and agreed to comply.     AM-PAC 6 CLICK MOBILITY  How much help from another person does this patient currently need?   1 = Unable, Total/Dependent Assistance  2 = A lot, Maximum/Moderate Assistance  3 = A little, Minimum/Contact Guard/Supervision  4 = None, Modified [place]/Independent    Turning over in bed (including adjusting bedclothes, sheets and blankets)?: 2  Sitting down on and standing up from a chair with arms (e.g., wheelchair, bedside commode, etc.): 2  Moving from lying on back to sitting on the side of the bed?: 2  Moving to and from a bed to a chair (including a wheelchair)?: 1  Need to walk in hospital room?: 1  Climbing 3-5 steps with a railing?: 1 (NT)  Basic Mobility Total Score: 9    AM-PAC Raw Score CMS G-Code Modifier Level of Impairment Assistance   6 % Total / Unable   7 - 9 CM 80 - 100% Maximal Assist   10 - 14 CL 60 - 80% Moderate Assist   15 - 19 CK 40 - 60% Moderate Assist   20 - 22 CJ 20 - 40% Minimal Assist   23 CI 1-20% SBA / CGA   24 CH 0% Independent/ Mod I     Patient left sitting edge of bed (supported with cushions & pillows) with call button in reach, bed alarm on, and family member present.    Assessment:  Jake Ortiz is a 82 y.o. male with a medical diagnosis of Acute renal failure superimposed on stage 3 chronic kidney disease and presents with overall decline in functional mobility. Patient would continue to benefit from skilled PT to address functional limitations listed below in order to return to PLOF/decrease caregiver burden. Patient demonstrated significant difficulty with participation in functional mobility training today. Patient appeared to be somewhat lethargic and had difficulty following commands at times. Nurse was notified of patient behavior. Unable to attempt gait training or chair T/F today due to decline in functional status as well as safety  concerns.     Rehab identified problem list/impairments: weakness, impaired endurance, impaired self care skills, impaired functional mobility, gait instability, impaired balance, impaired cognition, decreased coordination, impaired sensation, decreased upper extremity function, decreased lower extremity function, decreased safety awareness, pain, decreased ROM, impaired coordination, impaired cardiopulmonary response to activity    Rehab potential is fair.    Activity tolerance: Fair    Discharge recommendations: nursing facility, skilled      Barriers to discharge:      Equipment recommendations: to be determined by next level of care     GOALS:   Multidisciplinary Problems       Physical Therapy Goals          Problem: Physical Therapy    Goal Priority Disciplines Outcome Goal Variances Interventions   Physical Therapy Goal     PT, PT/OT Ongoing, Progressing     Description: LT23  1. PT WILL COMPLETE BED MOBILITY IND  2. PT WILL T/F TO CHAIR WITH RW AND SBA  3. PT WILL GT TRAIN X 150' WITH RW AND SBA                       PLAN:    Patient to be seen 3 x/week to address the above listed problems via gait training, therapeutic activities, therapeutic exercises  Plan of Care expires: 23  Plan of Care reviewed with: patient, family         05/15/2023

## 2023-05-15 NOTE — PROGRESS NOTES
'[facility]  Nephrology  Progress Note    Patient Name: Jake Ortiz  MRN: [id number]  Admission Date: 5/10/2023  Hospital Length of Stay: 1 days  Attending Provider: Chano Sanz MD   Primary Care Physician: Byron Stevens MD  Principal Problem:Acute renal failure superimposed on stage 3 chronic kidney disease    Subjective:     HPI: Noted    Interval History: Pt was seen and examined. Labs and meds reviewed. Discussed with other providers. No new events. Pt denies SOB. Lasix at low dose was re-started yesterday.    Review of patient's allergies indicates:  No Known Allergies  Current Facility-Administered Medications   Medication Frequency    0.9%  NaCl infusion (for blood administration) Q24H PRN    0.9%  NaCl infusion (for blood administration) Q24H PRN    acetaminophen suppository 650 mg Q6H PRN    acetaminophen tablet 650 mg Q8H PRN    aluminum-magnesium hydroxide-simethicone 200-200-20 mg/5 mL suspension 30 mL QID PRN    atorvastatin tablet 80 mg QHS    clonazePAM tablet 1 mg Nightly PRN    dextrose 10% bolus 125 mL 125 mL PRN    dextrose 10% bolus 250 mL 250 mL PRN    dextrose 40 % gel 15,000 mg PRN    dextrose 40 % gel 30,000 mg PRN    epoetin niraj-epbx injection 5,000 Units Q7 Days    furosemide tablet 20 mg Daily    glucagon (human recombinant) injection 1 mg PRN    HYDROcodone-acetaminophen 5-325 mg per tablet 1 tablet Q6H PRN    iron sucrose injection 200 mg Daily    loperamide capsule 2 mg QID PRN    melatonin tablet 6 mg Nightly PRN    morphine injection 2 mg Q4H PRN    naloxone 0.4 mg/mL injection 0.02 mg PRN    ondansetron injection 4 mg Q8H PRN    pantoprazole EC tablet 40 mg Daily    pregabalin capsule 75 mg BID    promethazine tablet 25 mg Q6H PRN    senna-docusate 8.6-50 mg per tablet 1 tablet BID PRN    sodium chloride 0.9% flush 3 mL Q12H PRN     Facility-Administered Medications Ordered in Other Encounters   Medication Frequency    ondansetron injection 4 mg Once  PRN       Objective:     Vital Signs (Most Recent):  Temp: 98.5 °F (36.9 °C) (05/15/23 0756)  Pulse: 60 (05/15/23 0756)  Resp: 18 (05/15/23 0756)  BP: 125/60 (05/15/23 0756)  SpO2: (!) 94 % (05/15/23 0756) Vital Signs (24h Range):  Temp:  [98.2 °F (36.8 °C)-100.7 °F (38.2 °C)] 98.5 °F (36.9 °C)  Pulse:  [60-69] 60  Resp:  [17-19] 18  SpO2:  [93 %-96 %] 94 %  BP: (110-132)/(53-63) 125/60     Weight: 97.1 kg (214 lb 1.1 oz) (05/14/23 0349)  Body mass index is 32.55 kg/m².  Body surface area is 2.16 meters squared.    I/O last 3 completed shifts:  In: 480 [P.O.:480]  Out: 1100 [Urine:1100]     Physical Exam  Vitals and nursing note reviewed.   Constitutional:       Appearance: Normal appearance.   Cardiovascular:      Rate and Rhythm: Normal rate and regular rhythm.      Pulses: Normal pulses.      Heart sounds: Normal heart sounds.   Pulmonary:      Effort: Pulmonary effort is normal.      Breath sounds: No rales.   Abdominal:      Palpations: Abdomen is soft.      Tenderness: There is no abdominal tenderness.   Musculoskeletal:      Right lower leg: No edema.      Left lower leg: No edema.   Neurological:      Mental Status: He is alert and oriented to person, place, and time.   Psychiatric:         Behavior: Behavior normal.        Significant Labs: reviewed  BMP  Lab Results   Component Value Date     05/15/2023    K 4.9 05/15/2023     05/15/2023    CO2 17 (L) 05/15/2023    BUN 96 (H) 05/15/2023    CREATININE 2.5 (H) 05/15/2023    CALCIUM 8.6 (L) 05/15/2023    ANIONGAP 14 05/15/2023    EGFRNORACEVR 25 (A) 05/15/2023     Lab Results   Component Value Date    WBC 8.86 05/15/2023    HGB 8.4 (L) 05/15/2023    HCT 27.1 (L) 05/15/2023    MCV 95 05/15/2023     05/15/2023     Lab Results   Component Value Date    IRON 11 (L) 05/12/2023    TRANSFERRIN 182 (L) 05/12/2023    TIBC 269 05/12/2023    FESATURATED 4 (L) 05/12/2023            Significant Imaging: CXR reviewed from yesterday, mild pulm  edema    Assessment/Plan:     83 y/o male presented with leg weakness after lasix dose was recently increased before admit:     Chronic combined systolic and diastolic congestive heart failure  Re-started lasix at low dose yesterday after re-emergence of pulmonary edema  No cardiopulmonary symptoms  Fluid status stable  Continue low dose lasix 20 mg po qd to bid  Adjust dose proportional to the amounts of fluid overload     To review: Pt presented first with overdiuresis (lasix 80 mg bid), then was given IVF's  IVF's now off     * Acute renal failure superimposed on stage 3 chronic kidney disease  s Cr stable, lower, slowly returning towards prior baseline of close to 2   VAIBHAV is mild, likely due to low BP and recent diarrhea; also from overdiuresis  s Cr in the past tended to fluctuate.  All c/w pre-renal azotemia (not clinically concerning)  CKD stage 3 at baseline.       HTN: BP controlled, was low on admit, likely due to overdiuresis and diarrhea     H/o of DM-2: reviewed the chart     Symptomatic anemia  Very low iron sats  Prior endoscopies in 2020 unremarkable  Anemia likely multifactorial: iron loss due to eliquis + CKD + poor diet     On Retacrit/epogen qw  On IV iron replacement: venofer 200 mg IV q d x 5  S/p PRBC transfusion  Consider consult GI        Plans and recommendations:  As discussed above  Total time spent 40 minutes including time needed to review the records, the   patient evaluation, documentation, face-to-face discussion with the patient,   more than 50% of the time was spent on coordination of care and counseling.            Rusty Aquino MD  Nephrology  [facility]

## 2023-05-15 NOTE — SUBJECTIVE & OBJECTIVE
Interval History: No acute issues overnight.     Review of Systems   Constitutional:  Negative for fatigue and fever.   HENT:  Negative for sinus pressure.    Eyes:  Negative for visual disturbance.   Respiratory:  Negative for shortness of breath.    Cardiovascular:  Negative for chest pain.   Gastrointestinal:  Negative for nausea and vomiting.   Genitourinary:  Negative for difficulty urinating.   Musculoskeletal:  Negative for back pain.   Skin:  Negative for rash.   Neurological:  Negative for headaches.   Psychiatric/Behavioral:  Negative for confusion.    Objective:     Vital Signs (Most Recent):  Temp: 98 °F (36.7 °C) (05/15/23 1657)  Pulse: (!) 59 (05/15/23 1657)  Resp: 16 (05/15/23 1657)  BP: (!) 143/62 (05/15/23 1657)  SpO2: 96 % (05/15/23 1657) Vital Signs (24h Range):  Temp:  [98 °F (36.7 °C)-100.7 °F (38.2 °C)] 98 °F (36.7 °C)  Pulse:  [58-70] 59  Resp:  [16-20] 16  SpO2:  [93 %-96 %] 96 %  BP: (118-143)/(60-63) 143/62     Weight: 97.1 kg (214 lb 1.1 oz)  Body mass index is 32.55 kg/m².    Intake/Output Summary (Last 24 hours) at 5/15/2023 1704  Last data filed at 5/15/2023 0845  Gross per 24 hour   Intake 720 ml   Output 550 ml   Net 170 ml         Physical Exam  Constitutional:       General: He is not in acute distress.     Appearance: Normal appearance. He is obese. He is not ill-appearing.   Cardiovascular:      Rate and Rhythm: Normal rate and regular rhythm.      Heart sounds: No murmur heard.  Pulmonary:      Effort: Pulmonary effort is normal. No respiratory distress.      Breath sounds: Normal breath sounds. No wheezing.   Abdominal:      General: There is no distension.      Palpations: Abdomen is soft.      Tenderness: There is no abdominal tenderness.   Musculoskeletal:         General: Swelling (Left knee) present.      Right lower leg: No edema.      Left lower leg: Edema (Trace LLE edema) present.   Neurological:      Mental Status: He is alert.           Significant Labs: All pertinent  labs within the past 24 hours have been reviewed.    Significant Imaging: I have reviewed all pertinent imaging results/findings within the past 24 hours.

## 2023-05-15 NOTE — PT/OT/SLP PROGRESS
"Occupational Therapy  Treatment    Jake Ortiz   MRN: [id number]   Admitting Diagnosis: Acute renal failure superimposed on stage 3 chronic kidney disease    OT Date of Treatment: 05/15/23   OT Start Time: 1131  OT Stop Time: 1155  OT Total Time (min): 24 min    Billable Minutes:  Therapeutic Activity 12 minutes and Therapeutic Exercise 12 minutes    OT/AME: OT          General Precautions: Standard, fall  Orthopedic Precautions: N/A  Braces: N/A  Subjective:  Communicated with nurse and epic chart review prior to session.    Pain/Comfort  Pain Rating 1: 8/10  Location - Side 1: Bilateral  Location - Orientation 1: generalized    Objective:  Patient found with: telemetry, peripheral IV       Therapeutic Activities and Exercises:  MOD A WITH SUPINE<SIT TRANSFERS   MIN A WITH SEATED BACKWARD SCOOT  MAX A X 2 WITH SIT<>STAND WITH VERBAL CUES FOR HAND PLACEMENT AND MAINTAINING UPRIGHT STANDING POSTURE.   PT SAT EOB WITH FAIR + SITTING BALANCE SELF SUPPORTED AND GOOD DYNAMIC SITTING BALANCE SUPPORTED.   PT PERFORMED 1 SET X 15 REPS B UE ROM EXERCISE SEATED EOB  PT APPEARED LETHARGIC THIS TX SESSION      AM-PAC 6 CLICK ADL   How much help from another person does this patient currently need?   1 = Unable, Total/Dependent Assistance  2 = A lot, Maximum/Moderate Assistance  3 = A little, Minimum/Contact Guard/Supervision  4 = None, Modified [place]/Independent    Putting on and taking off regular lower body clothing? : 2  Bathing (including washing, rinsing, drying)?: 2  Toileting, which includes using toilet, bedpan, or urinal? : 2  Putting on and taking off regular upper body clothing?: 3  Taking care of personal grooming such as brushing teeth?: 3  Eating meals?: 4  Daily Activity Total Score: 16     AM-PAC Raw Score CMS "G-Code Modifier Level of Impairment Assistance   6 % Total / Unable   7 - 8 CM 80 - 100% Maximal Assist   9-13 CL 60 - 80% Moderate Assist   14 - 19 CK 40 - 60% Moderate Assist   20 - 22 CJ " 20 - 40% Minimal Assist   23 CI 1-20% SBA / CGA   24 CH 0% Independent/ Mod I       Patient left sitting edge of bed with all lines intact, call button in reach, bed alarm on, NURSE notified, and FAMILY present    ASSESSMENT:  Jake Ortiz is a 82 y.o. male with a medical diagnosis of Acute renal failure superimposed on stage 3 chronic kidney disease and presents with DEBILITY AND GENERALIZED WEAKNESS.    Rehab identified problem list/impairments:  weakness, impaired endurance, impaired sensation, impaired self care skills, decreased upper extremity function, impaired functional mobility, decreased lower extremity function, gait instability, decreased safety awareness, impaired balance, pain, impaired cognition    Rehab potential is good.    Activity tolerance: Good    Discharge recommendations: nursing facility, skilled   Barriers to discharge:      Equipment recommendations: to be determined by next level of care    GOALS:   Multidisciplinary Problems       Occupational Therapy Goals          Problem: Occupational Therapy    Goal Priority Disciplines Outcome Interventions   Occupational Therapy Goal     OT, PT/OT Ongoing, Progressing    Description: Goals to be met by: 5/26/23     Patient will increase functional independence with ADLs by performing:    UE Dressing with [place].  Toileting from toilet with Modified [place] for hygiene and clothing management.   Stand pivot transfers with Modified [place].  Upper extremity exercise program x20 reps per handout, with independence.                         Plan:  Patient to be seen 2 x/week to address the above listed problems via self-care/home management, therapeutic activities, therapeutic exercises  Plan of Care expires: 05/26/23  Plan of Care reviewed with: patient, family         05/15/2023

## 2023-05-15 NOTE — PLAN OF CARE
Patient remains free from injury this shift. Safety precautions maintained. No s/s of acute distress noted. Pain controlled per MD order. Cardiac monitoring in place. Chart and orders review complete. Patient education about care complete.       Problem: Adult Inpatient Plan of Care  Goal: Plan of Care Review  Outcome: Ongoing, Progressing  Goal: Patient-Specific Goal (Individualized)  Outcome: Ongoing, Progressing  Goal: Absence of Hospital-Acquired Illness or Injury  Outcome: Ongoing, Progressing  Goal: Optimal Comfort and Wellbeing  Outcome: Ongoing, Progressing  Goal: Readiness for Transition of Care  Outcome: Ongoing, Progressing     Problem: Diabetes Comorbidity  Goal: Blood Glucose Level Within Targeted Range  Outcome: Ongoing, Progressing     Problem: Fluid and Electrolyte Imbalance (Acute Kidney Injury/Impairment)  Goal: Fluid and Electrolyte Balance  Outcome: Ongoing, Progressing     Problem: Oral Intake Inadequate (Acute Kidney Injury/Impairment)  Goal: Optimal Nutrition Intake  Outcome: Ongoing, Progressing     Problem: Renal Function Impairment (Acute Kidney Injury/Impairment)  Goal: Effective Renal Function  Outcome: Ongoing, Progressing     Problem: Skin Injury Risk Increased  Goal: Skin Health and Integrity  Outcome: Ongoing, Progressing

## 2023-05-15 NOTE — ASSESSMENT & PLAN NOTE
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  Patient is noted to be on multiple antihypertensive medications known to be nephrotoxic.  Plan:  -Avoid nephrotoxins (holding losartan and spironolactone)  -UA with trace protein  -Patient's wife reports Lasix dose was increased this past week; patient states he hasn't noted improvement in abdominal distention or LE edema  -Strict I/O's, renally dose mediations, AM labs    -BUN/Cr trending down short course of IV fluids  -Diuretics currently on hold  -Nephrology following    5/14  -CXR today mild pulm edema and small right pleural effusion  -Lasix 20 mg PO started today  -AM labs  -Nephrology following    5/15:  Creatinine stable  Cont lasix 20mg   Cleared for dc by nephro  Awaiting snf placement

## 2023-05-16 NOTE — PROGRESS NOTES
[facility] Surg  Nephrology  Progress Note    Patient Name: Jake Ortiz  MRN: [id number]  Admission Date: 5/10/2023  Hospital Length of Stay: 3 days  Attending Provider: Chano Sanz MD   Primary Care Physician: Byron Stevens MD  Principal Problem:Acute renal failure superimposed on stage 3 chronic kidney disease    Subjective:     HPI: Noted    Interval History: Pt was seen and examined. Labs and meds reviewed. Discussed with other providers. No new c/o's, no new events, no SOB.    Review of patient's allergies indicates:  No Known Allergies  Current Facility-Administered Medications   Medication Frequency    0.9%  NaCl infusion (for blood administration) Q24H PRN    0.9%  NaCl infusion (for blood administration) Q24H PRN    acetaminophen suppository 650 mg Q6H PRN    acetaminophen tablet 650 mg Q8H PRN    aluminum-magnesium hydroxide-simethicone 200-200-20 mg/5 mL suspension 30 mL QID PRN    atorvastatin tablet 80 mg QHS    clonazePAM tablet 1 mg Nightly PRN    dextrose 10% bolus 125 mL 125 mL PRN    dextrose 10% bolus 250 mL 250 mL PRN    dextrose 40 % gel 15,000 mg PRN    dextrose 40 % gel 30,000 mg PRN    epoetin niraj-epbx injection 5,000 Units Q7 Days    furosemide tablet 20 mg Daily    glucagon (human recombinant) injection 1 mg PRN    HYDROcodone-acetaminophen 5-325 mg per tablet 1 tablet Q6H PRN    iron sucrose injection 200 mg Daily    loperamide capsule 2 mg QID PRN    melatonin tablet 6 mg Nightly PRN    morphine injection 2 mg Q4H PRN    naloxone 0.4 mg/mL injection 0.02 mg PRN    ondansetron injection 4 mg Q8H PRN    pantoprazole EC tablet 40 mg Daily    pregabalin capsule 75 mg BID    promethazine tablet 25 mg Q6H PRN    senna-docusate 8.6-50 mg per tablet 1 tablet BID PRN    sodium chloride 0.9% flush 3 mL Q12H PRN     Facility-Administered Medications Ordered in Other Encounters   Medication Frequency    ondansetron injection 4 mg Once PRN       Objective:     Vital Signs  (Most Recent):  Temp: 98.5 °F (36.9 °C) (05/16/23 0723)  Pulse: 60 (05/16/23 0723)  Resp: 15 (05/16/23 0723)  BP: (!) 116/57 (05/16/23 1003)  SpO2: (!) 94 % (05/16/23 0723) Vital Signs (24h Range):  Temp:  [98 °F (36.7 °C)-98.9 °F (37.2 °C)] 98.5 °F (36.9 °C)  Pulse:  [59-70] 60  Resp:  [15-20] 15  SpO2:  [93 %-96 %] 94 %  BP: (116-143)/(57-64) 116/57     Weight: 97.1 kg (214 lb 1.1 oz) (05/14/23 0349)  Body mass index is 32.55 kg/m².  Body surface area is 2.16 meters squared.    I/O last 3 completed shifts:  In: 1440 [P.O.:1440]  Out: 550 [Urine:550]     Physical Exam  Vitals and nursing note reviewed.   Constitutional:       Appearance: Normal appearance.   Cardiovascular:      Rate and Rhythm: Normal rate and regular rhythm.      Pulses: Normal pulses.      Heart sounds: Normal heart sounds.   Pulmonary:      Effort: Pulmonary effort is normal.      Breath sounds: Normal breath sounds. No rales.   Abdominal:      Tenderness: There is no abdominal tenderness.   Musculoskeletal:      Right lower leg: No edema.      Left lower leg: No edema.   Neurological:      Mental Status: He is alert and oriented to person, place, and time.   Psychiatric:         Behavior: Behavior normal.        Significant Labs: reviewed  BMP  Lab Results   Component Value Date     05/16/2023    K 4.7 05/16/2023     05/16/2023    CO2 18 (L) 05/16/2023    BUN 88 (H) 05/16/2023    CREATININE 2.4 (H) 05/16/2023    CALCIUM 9.0 05/16/2023    ANIONGAP 16 05/16/2023    EGFRNORACEVR 27 (A) 05/16/2023     Lab Results   Component Value Date    WBC 8.45 05/16/2023    HGB 8.7 (L) 05/16/2023    HCT 28.8 (L) 05/16/2023    MCV 97 05/16/2023     05/16/2023         Assessment/Plan:     81 y/o male presented with leg weakness after lasix dose was recently increased before admit:     Chronic combined systolic and diastolic congestive heart failure  Re-started lasix at low dose 2 days ago after re-emergence of pulmonary edema  No  cardiopulmonary symptoms  Fluid status stable  Continue low dose lasix 20 mg po qd to bid. Discussed dosing with pt's wife in details.  Adjust dose proportional to the amounts of fluid overload     To review: Pt presented first with overdiuresis (lasix 80 mg bid), then was given IVF's  IVF's now off     * Acute renal failure superimposed on stage 3 chronic kidney disease  s Cr has continued to improve daily  s Cr returning towards prior baseline of close to 2   VAIBHAV is mild, likely due to low BP and recent diarrhea (pre-admit); also from overdiuresis  s Cr in the past tended to fluctuate.  Pre-renal azotemia   CKD stage 3 at baseline.       HTN: BP controlled     H/o of DM-2: reviewed the chart     Symptomatic anemia  Very low iron sats  Prior endoscopies in 2020 unremarkable  Anemia likely multifactorial: iron loss due to eliquis + CKD + poor diet     On Retacrit/epogen qw  On IV iron replacement: venofer 200 mg IV q d x 5  S/p PRBC transfusion  Consider consult GI        Plans and recommendations:  As discussed above  Total time spent 40 minutes including time needed to review the records, the   patient evaluation, documentation, face-to-face discussion with the patient,   more than 50% of the time was spent on coordination of care and counseling.            Rusty Aquino MD  Nephrology  [facility]

## 2023-05-16 NOTE — SUBJECTIVE & OBJECTIVE
Interval History: Pt was seen and examined. Labs and meds reviewed. Discussed with other providers. No new c/o's, no new events, no SOB.    Review of patient's allergies indicates:  No Known Allergies  Current Facility-Administered Medications   Medication Frequency    0.9%  NaCl infusion (for blood administration) Q24H PRN    0.9%  NaCl infusion (for blood administration) Q24H PRN    acetaminophen suppository 650 mg Q6H PRN    acetaminophen tablet 650 mg Q8H PRN    aluminum-magnesium hydroxide-simethicone 200-200-20 mg/5 mL suspension 30 mL QID PRN    atorvastatin tablet 80 mg QHS    clonazePAM tablet 1 mg Nightly PRN    dextrose 10% bolus 125 mL 125 mL PRN    dextrose 10% bolus 250 mL 250 mL PRN    dextrose 40 % gel 15,000 mg PRN    dextrose 40 % gel 30,000 mg PRN    epoetin niraj-epbx injection 5,000 Units Q7 Days    furosemide tablet 20 mg Daily    glucagon (human recombinant) injection 1 mg PRN    HYDROcodone-acetaminophen 5-325 mg per tablet 1 tablet Q6H PRN    iron sucrose injection 200 mg Daily    loperamide capsule 2 mg QID PRN    melatonin tablet 6 mg Nightly PRN    morphine injection 2 mg Q4H PRN    naloxone 0.4 mg/mL injection 0.02 mg PRN    ondansetron injection 4 mg Q8H PRN    pantoprazole EC tablet 40 mg Daily    pregabalin capsule 75 mg BID    promethazine tablet 25 mg Q6H PRN    senna-docusate 8.6-50 mg per tablet 1 tablet BID PRN    sodium chloride 0.9% flush 3 mL Q12H PRN     Facility-Administered Medications Ordered in Other Encounters   Medication Frequency    ondansetron injection 4 mg Once PRN       Objective:     Vital Signs (Most Recent):  Temp: 98.5 °F (36.9 °C) (05/16/23 0723)  Pulse: 60 (05/16/23 0723)  Resp: 15 (05/16/23 0723)  BP: (!) 116/57 (05/16/23 1003)  SpO2: (!) 94 % (05/16/23 0723) Vital Signs (24h Range):  Temp:  [98 °F (36.7 °C)-98.9 °F (37.2 °C)] 98.5 °F (36.9 °C)  Pulse:  [59-70] 60  Resp:  [15-20] 15  SpO2:  [93 %-96 %] 94 %  BP: (116-143)/(57-64) 116/57     Weight: 97.1 kg  (214 lb 1.1 oz) (05/14/23 0349)  Body mass index is 32.55 kg/m².  Body surface area is 2.16 meters squared.    I/O last 3 completed shifts:  In: 1440 [P.O.:1440]  Out: 550 [Urine:550]     Physical Exam  Vitals and nursing note reviewed.   Constitutional:       Appearance: Normal appearance.   Cardiovascular:      Rate and Rhythm: Normal rate and regular rhythm.      Pulses: Normal pulses.      Heart sounds: Normal heart sounds.   Pulmonary:      Effort: Pulmonary effort is normal.      Breath sounds: Normal breath sounds. No rales.   Abdominal:      Tenderness: There is no abdominal tenderness.   Musculoskeletal:      Right lower leg: No edema.      Left lower leg: No edema.   Neurological:      Mental Status: He is alert and oriented to person, place, and time.   Psychiatric:         Behavior: Behavior normal.        Significant Labs: reviewed  BMP  Lab Results   Component Value Date     05/16/2023    K 4.7 05/16/2023     05/16/2023    CO2 18 (L) 05/16/2023    BUN 88 (H) 05/16/2023    CREATININE 2.4 (H) 05/16/2023    CALCIUM 9.0 05/16/2023    ANIONGAP 16 05/16/2023    EGFRNORACEVR 27 (A) 05/16/2023     Lab Results   Component Value Date    WBC 8.45 05/16/2023    HGB 8.7 (L) 05/16/2023    HCT 28.8 (L) 05/16/2023    MCV 97 05/16/2023     05/16/2023

## 2023-05-16 NOTE — PLAN OF CARE
Patient remains free from injury this shift. Safety precautions maintained. No s/s of acute distress noted. Pain controlled per MD order. Cardiac monitoring in place. Chart and orders review complete. Patient education about care complete.      Problem: Adult Inpatient Plan of Care  Goal: Plan of Care Review  Outcome: Ongoing, Progressing  Goal: Patient-Specific Goal (Individualized)  Outcome: Ongoing, Progressing  Goal: Absence of Hospital-Acquired Illness or Injury  Outcome: Ongoing, Progressing  Goal: Optimal Comfort and Wellbeing  Outcome: Ongoing, Progressing  Goal: Readiness for Transition of Care  Outcome: Ongoing, Progressing     Problem: Diabetes Comorbidity  Goal: Blood Glucose Level Within Targeted Range  Outcome: Ongoing, Progressing     Problem: Fluid and Electrolyte Imbalance (Acute Kidney Injury/Impairment)  Goal: Fluid and Electrolyte Balance  Outcome: Ongoing, Progressing     Problem: Oral Intake Inadequate (Acute Kidney Injury/Impairment)  Goal: Optimal Nutrition Intake  Outcome: Ongoing, Progressing     Problem: Renal Function Impairment (Acute Kidney Injury/Impairment)  Goal: Effective Renal Function  Outcome: Ongoing, Progressing     Problem: Skin Injury Risk Increased  Goal: Skin Health and Integrity  Outcome: Ongoing, Progressing     Problem: Infection  Goal: Absence of Infection Signs and Symptoms  Outcome: Ongoing, Progressing

## 2023-05-16 NOTE — DISCHARGE SUMMARY
[facility] Medicine  Discharge Summary      Patient Name: Jake Ortiz  MRN: [id number]  ELSIE: [id number]  Patient Class: IP- Inpatient  Admission Date: 5/10/2023  Hospital Length of Stay: 3 days  Discharge Date and Time:  05/16/2023 2:44 PM  Attending Physician: Chano Sanz MD   Discharging Provider: Chano Sanz MD  Primary Care Provider: Byron Stevens MD    Primary Care Team: Networked reference to record PCT     HPI:   Jake Ortiz is a 82 y.o. 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 who presented to the ED for worsening bilateral lower extremity weakness x2 days duration.  Patient reported upon waking earlier this morning, his legs gave out causing him to fall on his left knee has been unable to walk/stand since then.  Patient denied endorsing any head trauma, loss of consciousness, or sustaining any other injuries in his only complaining of left knee pain described as hurting/sore in nature, constant, currently rated 5/10 in severity with aggravating factors including weight-bearing, movement, and palpation to the affected area.  He denied endorsing any lower back pain, hip pain, numbness/tingling of his lower extremities, bowel/bladder incontinence, and reported all other review of systems negative except as noted above.  Of note, patient did have his nerve stimulator manipulated yesterday and reported being in his usual state of health prior to onset of symptoms.  Initial workup in the ED revealed CT lumbar spine and hip positive for degenerative disease but negative for evidence of  fractures or dislocations.  Ortho/spine was consulted by ED staff rolling further imaging via MRI with recommendations to admit to hospital medicine for continued medical management and treatment/workup of metabolic causes as well as PT/OT evaluation inpatient will be evaluated in the morning.      PCP: Byron Stevens      * No surgery found *      Hospital Course:   5/11/23  NAEON. Presented yesterday for weakness Wife at bedside, provides history.  Patient reportedly fell onto his left knee, c/o left knee pain, +ROM  Has been having trouble to stand and ambulate, fell several times per wife    Wife reports abdominal distended, BLE edema; recently has Lasix dose increased but not improvement  Patient with CHF, noted elevated BNP. Check TTE, Lasix IV, strict I/O's  H/H also trending down, known chronic anemia  Transfuse 1 unit PRBC today for symptmoatic anemia  Imodium given for diarrhea this AM, no further episodes since    5/12/23  NAEON. Patient reports fatigue, left knee pain, swelling; CT ordered  Cr 3.5 --> 2.9, received course of IV fluids overnight  H/H 8/25.4 after 1 unit PRBC yesterday  Fecal occult+, patient does reports dark, tarry stools    5/13/23  NAEON. Reports weakness, fatigue mild improvement c/o left knee pain  CT with small to moderate left knee effusion, consulted Orthopedics  Cr 2.7, Hg 7.8, discussed with Nephrology, 1 unit PRBC transfusion today    5/14/23  NAEON. Patient attempting sitting at edge of bed, attempting to urinate  Patient reports weakness, IKNG - overall improving  CXR today with mild pulmonary edema, small right pleural effusion    Discussed with Nephrology, plans to restarted Lasix today  K 5.4, will stop PO potassium supplementation, resume when appropriate  Upated patient's wife at bedside    5/15: awaiting SNF placement. Cont lasix 20mg daily.   5/16: accepted to snf. Patient was dced       Goals of Care Treatment Preferences:  Code Status: Full Code      Consults:    Consults (From admission, onward)        Status Ordering Provider     Inpatient consult to Orthopedic Surgery  Once        Provider:  Chi Arriola MD    Completed SHELL, FE     Inpatient consult to Social Work  Once        Provider:  (Not yet assigned)    Completed SHELL, FE     Inpatient consult to Nephrology  Once        Provider:  Tavo Morel MD    Completed SHELL, FE     Inpatient consult to Cardiology  Once        Provider:  Doug Mcfadden MD    Completed SHELL, FE          No new Assessment & Plan notes have been filed under this hospital service since the last note was generated.  Service: Hospital Medicine    Final Active Diagnoses:    Diagnosis Date Noted POA    PRINCIPAL PROBLEM:  Acute renal failure superimposed on stage 3 chronic kidney disease [N17.9, N18.30] 09/18/2020 Yes    Fall [W19.XXXA] 05/15/2023 Yes    Chronic pain of left knee [M25.562, G89.29] 05/13/2023 Yes    Weakness of both lower extremities [R29.898] 05/11/2023 Unknown    Class 1 obesity due to excess calories with serious comorbidity and body mass index (BMI) of 32.0 to 32.9 in adult [E66.09, Z68.32] 05/11/2023 Not Applicable    Symptomatic anemia [D64.9] 12/22/2021 Yes    Obstructive sleep apnea [G47.33] 06/05/2018 Yes    Chronic combined systolic and diastolic congestive heart failure [I50.42] 03/05/2018 Yes    Atrial fibrillation with chronic anticoagulation with Eliquis [I48.91] 10/19/2017 Yes    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    Specialty: Family Medicine  Contact information:  [address] [facility] [address] [phone]                       Patient Instructions:   No discharge procedures on file.    Significant Diagnostic Studies: Labs:   BMP:   Recent Labs   Lab 05/15/23  0655 05/16/23  0842   * 140*    141   K 4.9 4.7    107   CO2 17* 18*   BUN 96* 88*   CREATININE 2.5* 2.4*   CALCIUM 8.6* 9.0    and CBC   Recent Labs   Lab 05/15/23  0655 05/16/23  0842   WBC 8.86 8.45   HGB 8.4* 8.7*   HCT 27.1* 28.8*    352       Pending Diagnostic Studies:     None         Medications:  Reconciled Home Medications:      Medication List      START taking these medications    ferrous sulfate 325 mg (65 mg iron) Tab tablet  Commonly known as: FEOSOL  Take 1 tablet (325 mg total) by mouth 2 (two) times daily.        CHANGE how you take these medications    furosemide 20 MG tablet  Commonly known as: LASIX  Take 1 tablet (20 mg total) by mouth once daily.  What changed:   · medication strength  · how much to take  · when to take this  · additional instructions        CONTINUE taking these medications    acetaminophen 500 MG tablet  Commonly known as: TYLENOL  Take 2 tablets (1,000 mg total) by mouth every 6 (six) hours as needed for Pain.     apixaban 2.5 mg Tab  Commonly known as: ELIQUIS  Take 1 tablet (2.5 mg total) by mouth 2 (two) times daily.     atorvastatin 80 MG tablet  Commonly known as: LIPITOR  Take 1 tablet (80 mg total) by mouth every evening.     blood sugar diagnostic Strp  Check blood glucose levels daily in the AM fasting and 1-2 times more daily.     cholecalciferol (vitamin D3) 25 mcg (1,000 unit) capsule  Commonly known as: VITAMIN D3  Take 1,000 Units by mouth once daily.     clonazePAM 1 MG tablet  Commonly known as: KlonoPIN  Take 1 tablet (1 mg total) by mouth nightly as needed for Anxiety.     fluticasone  propionate 50 mcg/actuation nasal spray  Commonly known as: FLONASE  2 sprays (100 mcg total) by Each Nostril route once daily.     lancets Misc  Check blood glucose levels daily in the AM fasting and 1-2 times more daily.     levocetirizine 5 MG tablet  Commonly known as: XYZAL  Take 1 tablet (5 mg total) by mouth every evening.     losartan 50 MG tablet  Commonly known as: COZAAR  Take 1 tablet (50 mg total) by mouth once daily.     MEGARED OMEGA-3 KRILL OIL ORAL  Take 1 capsule by mouth every morning.     multivitamin capsule  Take 1 capsule by mouth once daily. Takes once a week     OCUVITE PRESERVISION ORAL  Take 1 capsule by mouth every evening.     pantoprazole 40 MG tablet  Commonly known as: PROTONIX  Take 1 tablet (40 mg total) by mouth once daily.     pregabalin 75 MG capsule  Commonly known as: LYRICA  Take 1 capsule QHS x 1 week, then increase to BID (if tolerated).     pyridoxine (vitamin B6) 100 MG Tab  Commonly known as: B-6  Take 50 mg by mouth once daily.        STOP taking these medications    cefadroxil 500 MG Cap  Commonly known as: DURICEF     potassium chloride SA 20 MEQ tablet  Commonly known as: K-DUR,KLOR-CON     spironolactone 25 MG tablet  Commonly known as: ALDACTONE            Indwelling Lines/Drains at time of discharge:   Lines/Drains/Airways     None                 Time spent on the discharge of patient: 37 minutes         Chano Sanz MD  Department of Hospital Medicine  [facility]

## 2023-05-16 NOTE — ASSESSMENT & PLAN NOTE
83 y/o male presented with leg weakness after lasix dose was recently increased before admit:     Chronic combined systolic and diastolic congestive heart failure  Re-started lasix at low dose yesterday after re-emergence of pulmonary edema  No cardiopulmonary symptoms  Fluid status stable  Continue low dose lasix 20 mg po qd to bid  Adjust dose proportional to the amounts of fluid overload     To review: Pt presented first with overdiuresis (lasix 80 mg bid), then was given IVF's  IVF's now off     * Acute renal failure superimposed on stage 3 chronic kidney disease  s Cr stable, lower, slowly returning towards prior baseline of close to 2   VAIBHAV is mild, likely due to low BP and recent diarrhea; also from overdiuresis  s Cr in the past tended to fluctuate.  All c/w pre-renal azotemia (not clinically concerning)  CKD stage 3 at baseline.       HTN: BP controlled, was low on admit, likely due to overdiuresis and diarrhea     H/o of DM-2: reviewed the chart     Symptomatic anemia  Very low iron sats  Prior endoscopies in 2020 unremarkable  Anemia likely multifactorial: iron loss due to eliquis + CKD + poor diet     On Retacrit/epogen qw  On IV iron replacement: venofer 200 mg IV q d x 5  S/p PRBC transfusion  Consider consult GI        Plans and recommendations:  As discussed above  Total time spent 40 minutes including time needed to review the records, the   patient evaluation, documentation, face-to-face discussion with the patient,   more than 50% of the time was spent on coordination of care and counseling.

## 2023-05-16 NOTE — PT/OT/SLP PROGRESS
Physical Therapy  Treatment    Jake Ortiz   MRN: [id number]   Admitting Diagnosis: Acute renal failure superimposed on stage 3 chronic kidney disease    PT Received On: 05/16/23  PT Start Time: 1340     PT Stop Time: 1405    PT Total Time (min): 25 min       Billable Minutes:  Therapeutic Activity 15 and Therapeutic Exercise 10    Treatment Type: Treatment  PT/PTA: PTA     Number of PTA visits since last PT visit: 2       General Precautions: Standard, fall  Orthopedic Precautions: N/A  Braces: N/A  Respiratory Status: Room air    Spiritual, Cultural Beliefs, Voodoo Practices, Values that Affect Care: no    Subjective:  Communicated with patient's nurse, Dioni, and completed Epic chart review prior to session.  Patient agreed to PT session.     Pain/Comfort  Pain Rating 1: 5/10  Location - Side 1: Bilateral  Location 1: foot  Pain Addressed 1: Other (see comments) (ACTIVITY PACING)  Pain Rating Post-Intervention 1: 5/10    Objective:   Patient found with: telemetry, peripheral IV    Patient found sitting EOB.    STS from EOB > RW: Mod A of 2    Side stepping along EOB ~5ft w/ RW: Mod A of 2   X2 laps to L & 1 lap to R    Posterior scoot into bed: Min A     Seated EOB x 10 min total focusing on increased tolerance to upright position, core stability, trunk control and CV endurance.   Maintained self supported sitting balance with SBA  Maintained unsupported sitting balance with  CGA-Min A     Completed x15 reps AROM TE to BLE: LAQ, Hip Flex, AP   Intermittent cues given as needed to maintain correct form during repetitions     Educated patient on importance of increased tolerance to upright position and direct impact on CV endurance and strength. Patient encouraged to utilize elevated HOB/supported sitting EOB to simulate chair position until able to safely complete chair T/F. Patient given a minimum goal of majority of the day to be spent in upright, especially with all meals. Encouraged patient to perform AROM  TE to BLE throughout the day within all available planes of motion. Re enforced importance of utilizing call light to meet needs in room and not attempt to get up without staff assistance. Patient verbalized understanding and agreed to comply.       AM-PAC 6 CLICK MOBILITY  How much help from another person does this patient currently need?   1 = Unable, Total/Dependent Assistance  2 = A lot, Maximum/Moderate Assistance  3 = A little, Minimum/Contact Guard/Supervision  4 = None, Modified [place]/Independent    Turning over in bed (including adjusting bedclothes, sheets and blankets)?: 1 (NT)  Sitting down on and standing up from a chair with arms (e.g., wheelchair, bedside commode, etc.): 2  Moving from lying on back to sitting on the side of the bed?: 1 (NT)  Moving to and from a bed to a chair (including a wheelchair)?: 1  Need to walk in hospital room?: 1  Climbing 3-5 steps with a railing?: 1 (NT)  Basic Mobility Total Score: 7    AM-PAC Raw Score CMS G-Code Modifier Level of Impairment Assistance   6 % Total / Unable   7 - 9 CM 80 - 100% Maximal Assist   10 - 14 CL 60 - 80% Moderate Assist   15 - 19 CK 40 - 60% Moderate Assist   20 - 22 CJ 20 - 40% Minimal Assist   23 CI 1-20% SBA / CGA   24 CH 0% Independent/ Mod I     Patient left sitting edge of bed (supported with cushions & pillows) with call button in reach, bed alarm on, and wife present.    Assessment:  Jake Ortiz is a 82 y.o. male with a medical diagnosis of Acute renal failure superimposed on stage 3 chronic kidney disease and presents with overall decline in functional mobility. Patient would continue to benefit from skilled PT to address functional limitations listed below in order to return to PLOF/decrease caregiver burden.      Rehab identified problem list/impairments: weakness, impaired endurance, impaired sensation, impaired self care skills, impaired functional mobility, gait instability, impaired balance, impaired cognition,  decreased coordination, decreased upper extremity function, decreased lower extremity function, decreased safety awareness, pain, decreased ROM, impaired coordination, impaired cardiopulmonary response to activity    Rehab potential is fair.    Activity tolerance: Fair    Discharge recommendations: nursing facility, skilled      Barriers to discharge:      Equipment recommendations: to be determined by next level of care     GOALS:   Multidisciplinary Problems       Physical Therapy Goals          Problem: Physical Therapy    Goal Priority Disciplines Outcome Goal Variances Interventions   Physical Therapy Goal     PT, PT/OT Ongoing, Progressing     Description: LT23  1. PT WILL COMPLETE BED MOBILITY IND  2. PT WILL T/F TO CHAIR WITH RW AND SBA  3. PT WILL GT TRAIN X 150' WITH RW AND SBA                       PLAN:    Patient to be seen 3 x/week to address the above listed problems via gait training, therapeutic activities, therapeutic exercises  Plan of Care expires: 23  Plan of Care reviewed with: patient, spouse         2023

## 2023-05-16 NOTE — PLAN OF CARE
Pt and transport provided with discharge paperwork. Pt verbalized understanding of discharge instructions. IV and tele monitor removed. Pt being transported by [facility] to [facility].

## 2023-05-24 NOTE — PHYSICIAN QUERY
PT Name: Jake Ortiz  MR #: [id number]     DOCUMENTATION CLARIFICATION    CDI : Elida Reynolds RN, CDS              Contact information: [email or web address]   This form is a permanent document in the medical record.     Query Date: May 24, 2023    By submitting this query, we are merely seeking further clarification of documentation.  Please utilize your independent clinical judgment when addressing the question(s) below.    The Medical Record contains the following   Indicators Supporting Clinical Findings Location in Medical Record   x Heart Failure documented Chronic combined systolic and diastolic congestive heart failure  CHF is currently controlled    Combined Systolic and Diastolic heart failure that is Acute on chronic. CHF is currently controlled and uncontrolled due to     Chronic combined systolic and diastolic congestive heart failure   -Echo this admission showed EF of 50%   -Came in dehydrated, secondary to diarrhea/over-diuresis   -Can resume po Lasix soon   -Hold ARB and Aldactone for now   -Monitor volume status closely  Hospital Medicine 5/10      [facility] Medicine 5/11      Cardiology 5/11     x BNP   BNP on the low side and stable   Labs 5/10  Cardiology 5/11  Labs 5/12   x EF/Echo Eccentric hypertrophy and low normal systolic function.  Moderate left atrial enlargement.  The estimated ejection fraction is 50%.  Indeterminate left ventricular diastolic function.  There is abnormal septal wall motion consistent with left bundle branch block.  Normal right ventricular size with normal right ventricular systolic function.  Mild-to-moderate mitral regurgitation.  Mild to moderate tricuspid regurgitation. ECHO 5/11   x Radiology findings Left chest biventricular pacing.  Improved perihilar bronchovascular crowding compared to prior exam.  Cardiomegaly the hilar and mediastinal contours are unremarkable.  Bones are intact..  Senescent changes.  Mild basilar atelectasis.    Prior median  sternotomy with a left-sided AICD/pacer device in place.  Low lung volumes.  Small pleural fluid on the right.  Pulmonary vascular congestion.  This has increased in the interval.  No pneumothorax.  The cardiopericardial silhouette remains enlarged with calcification of the aorta.  No acute osseous findings. CXR 5/10          CXR 5/14   x Subjective/Objective Respiratory Conditions currently saturating  100% on room air.  Lungs are clear  Lungs: breathing comfortably, equal chest rise bilat [facility] medicine 5/10  Nephrology 5/11  Orthopedic surgery 5/13      Recent/Current MI      Heart Transplant, LVAD     x Edema, JVD Continued edema of extremities.  No JVD  On exam today he was little to no lower extremity edema Hospital Medicine 5/11  Cardiology 5/11  Nephrology 5/11    Ascites     x Diuretics/Meds Furosemide 20mg oral daily    5/14: plans to restarted Lasix today MAR 5/14-16    Hospital Medicine discharge summary   x Other Treatment Continue Beta Blocker, ACE/ARB, Furosemide and Aldactone and monitor clinical status closely. Monitor on telemetry. Patient is off CHF pathway.  Monitor strict Is&Os and daily weights.  Place on fluid restriction of 1.5 L.     Patient with CHF, noted elevated BNP. Check TTE, Lasix IV, strict I/O's    VAIBHAV is consistent with over diuresis and prerenal azotemia.  I would suggest holding diuretics and starting gentle hydration overnight.   [facility] Medicine 5/10          [facility] Medicine 5/11      Nephrology 5/11   x Other History of NASREEN, tobacco dependence   Hospital medicine discharge summary     Heart failure is a clinical diagnosis which includes symptomatic fluid retention, elevated intracardiac pressures, and/or the inability of the heart to deliver adequate blood flow.    Heart Failure with reduced Ejection Fraction (HFrEF) or Systolic Heart Failure (loses ability to contract normally, EF is <40%)    Heart Failure with preserved Ejection Fraction (HFpEF) or Diastolic Heart  Failure (stiff ventricles, does not relax properly, EF is >50%)     Heart Failure with Combined Systolic and Diastolic Failure (stiff ventricles, does not relax properly and EF is <50%)    Mid-range or mildly reduced ejection fraction (HFmrEF) is classified as systolic heart failure.  Congestive heart failure with a recovered EF is classified as Diastolic Heart Failure.  Common clues to acute exacerbation:  Rapidly progressive symptoms (w/in 2 weeks of presentation), using IV diuretics, using supplemental O2, pulmonary edema on Xray, new or worsening pleural effusion, +JVD or other signs of volume overload, MI w/in 4 weeks, and/or BNP >500  The clinical guidelines noted are only system guidelines, and do not replace the providers clinical judgment.      Provider, due to a discrepancy in the medical record, please clarify the diagnosis associated with the above clinical findings.    [ x  ]  Acute on Chronic Combined Systolic and Diastolic Heart Failure - worsening of CHF signs/symptoms in preexisting CHF   [   ]  Chronic Combined Systolic and Diastolic Heart Failure - pre-existing and stable   [   ]  Other (please specify): ___________________________________           Please document in your progress notes daily for the duration of treatment until resolved and include in your discharge summary.    References:  American Heart Association editorial staff. (2017, May). Ejection Fraction Heart Failure Measurement. American Heart Association. https://www.heart.org/en/health-topics/heart-failure/diagnosing-heart-failure/ejection-fraction-heart-failure-measurement#:~:text=Ejection%20fraction%20(EF)%20is%20a,pushed%20out%20with%20each%20heartbeat  LUCIA Grey (2020, December 15). Heart failure with preserved ejection fraction: Clinical manifestations and diagnosis. [employer]ia.ltte. https://www.[employer].com/contents/heart-failure-with-preserved-ejection-fraction-clinical-manifestations-and-diagnosis.  ICD-10-CM/PCS Coding  Clinic Third Quarter ICD-10, Effective with discharges: September 8, 2020 [facility] [place] § Heart failure with mid-range or mildly reduced ejection fraction (2020).  ICD-10-CM/PCS Coding Clinic Third Quarter ICD-10, Effective with discharges: September 8, 2020 [facility] [place] § Heart failure with recovered ejection fraction (2020).  Form No. [id number]

## 2023-05-29 NOTE — PHARMACY MED REC
"Admission Medication History     The home medication history was taken by Ramón Walker.    You may go to "Admission" then "Reconcile Home Medications" tabs to review and/or act upon these items.     The home medication list has been updated by the Pharmacy department.   Please read ALL comments highlighted in yellow.   Please address this information as you see fit.    Feel free to contact us if you have any questions or require assistance.        Medications listed below were obtained from: Nursing home  (Not in a hospital admission)      Ramón Walker  [id number]    Current Outpatient Medications on File Prior to Encounter   Medication Sig Dispense Refill Last Dose    apixaban (ELIQUIS) 2.5 mg Tab Take 1 tablet (2.5 mg total) by mouth 2 (two) times daily. 180 tablet 1 5/29/2023    atorvastatin (LIPITOR) 80 MG tablet Take 1 tablet (80 mg total) by mouth every evening. 90 tablet 1 5/28/2023    cholecalciferol, vitamin D3, (VITAMIN D3) 25 mcg (1,000 unit) capsule Take 1,000 Units by mouth once daily.   5/29/2023    clonazePAM (KLONOPIN) 1 MG tablet Take 1 tablet (1 mg total) by mouth nightly as needed for Anxiety. 90 tablet 0 5/28/2023    ferrous sulfate (FEOSOL) 325 mg (65 mg iron) Tab tablet Take 1 tablet (325 mg total) by mouth 2 (two) times daily. 60 tablet 1 5/29/2023    fluticasone propionate (FLONASE) 50 mcg/actuation nasal spray 2 sprays (100 mcg total) by Each Nostril route once daily. 48 g 5 5/29/2023    furosemide (LASIX) 20 MG tablet Take 1 tablet (20 mg total) by mouth once daily. 30 tablet 11 5/29/2023    levocetirizine (XYZAL) 5 MG tablet Take 1 tablet (5 mg total) by mouth every evening. 90 tablet 1 5/28/2023    losartan (COZAAR) 50 MG tablet Take 1 tablet (50 mg total) by mouth once daily. 90 tablet 3 5/29/2023    multivitamin capsule Take 1 capsule by mouth once daily. Takes once a week   5/29/2023    pantoprazole (PROTONIX) 40 MG tablet Take 1 tablet (40 mg total) by mouth once daily. 90 " tablet 3 5/29/2023    pregabalin (LYRICA) 75 MG capsule Take 1 capsule QHS x 1 week, then increase to BID (if tolerated). 60 capsule 1 5/29/2023    pyridoxine, vitamin B6, (B-6) 100 MG Tab Take 50 mg by mouth once daily.   5/29/2023    vit A/vit C/vit E/zinc/copper (OCUVITE PRESERVISION ORAL) Take 1 capsule by mouth every evening.   5/28/2023    acetaminophen (TYLENOL) 500 MG tablet Take 2 tablets (1,000 mg total) by mouth every 6 (six) hours as needed for Pain.  0 5/22/2023    blood sugar diagnostic Strp Check blood glucose levels daily in the AM fasting and 1-2 times more daily. 300 strip 3     krill/om-3/dha/epa/phospho/ast (MEGARED OMEGA-3 KRILL OIL ORAL) Take 1 capsule by mouth every morning.   Unknown    lancets Misc Check blood glucose levels daily in the AM fasting and 1-2 times more daily. 300 each 3                            .

## 2023-05-29 NOTE — ED PROVIDER NOTES
SCRIBE #1 NOTE: I, Saskia Mckeon, am scribing for, and in the presence of, Diamond Mann MD. I have scribed the HPI, ROS, and PEx.     SCRIBE #2 NOTE: I, Jamir Ridley, am scribing for, and in the presence of,  Merari Hernandez DO. I have scribed the remaining portions of the note not scribed by Scribe #1.    History     Chief Complaint   Patient presents with    Fatigue     Pt presents to ed via aasi c/o lethargy. Pt coming from [facility] and went home for the weekend and noticed he was more lethargic than normal, denies chest pain or sob     Review of patient's allergies indicates:  No Known Allergies      History of Present Illness     HPI    5/29/2023, 2:17 PM  History obtained from the patient      History of Present Illness: Jake Ortiz is a 82 y.o. male patient with a PMHx of anemia, arthritis, A-fib, CHF, CAD,  type 2 DM, HLD, HTN, MI, NASREEN, prostate cancer, who presents to the Emergency Department for evaluation of fatigue which onset earlier today. Patient came to the ED from [facility] and went home for the weekend and noticed he was more lethargic than normal. Symptoms are constant and moderate in severity. No mitigating or exacerbating factors reported. Associated sxs include generalized weakness, diarrhea, BLE edema. Patient denies any fever, chills, N/V, CP, SOB, lightheadedness, numbness, HA, and all other sxs at this time. No prior Tx reported. No further complaints or concerns at this time.       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    #4 PROSTATE BIOPSY, LEFT APEX: ADENOCARCINOMA, FARHAD GRADE 3 + 3 = 6, IN 1 of 2 CORES 9/8/2014    Anemia     Arthritis     Atrial fibrillation 10/19/2017    Back pain     Congestive heart failure 03/05/2018    Coronary artery disease     DM (diabetes mellitus) 2018     am 06/23/2020    DM (diabetes mellitus) 2016      am 08/17/2021    Hyperlipemia     Hypertension     Myocardial infarction     NASREEN (obstructive sleep apnea) 06/05/2018    Prostate cancer 2015    Tobacco dependence     Type 2 diabetes mellitus        Past Surgical History:  Past Surgical History:   Procedure Laterality Date    COLONOSCOPY N/A 9/22/2020    Procedure: COLONOSCOPY;  Surgeon: Javier Farmer MD;  Location: [facility] ENDO;  Service: Endoscopy;  Laterality: N/A;    CORONARY ARTERY BYPASS GRAFT  1987    EPIDURAL STEROID INJECTION N/A 11/22/2019    Procedure: Lumbar L5/S1 IL XUAN;  Surgeon: Tacho Trivedi MD;  Location: [facility] PAIN [facility]GT;  Service: Pain Management;  Laterality: N/A;    EPIDURAL STEROID INJECTION N/A 1/6/2020    Procedure: Lumbar L5/S1 IL XUAN;  Surgeon: Tacho Trivedi MD;  Location: [facility];  Service: Pain Management;  Laterality: N/A;    EPIDURAL STEROID INJECTION N/A 2/10/2020    Procedure: Caudal XUAN;  Surgeon: Tacho Trivedi MD;  Location: [facility];  Service: Pain Management;  Laterality: N/A;    ESOPHAGOGASTRODUODENOSCOPY N/A 9/22/2020    Procedure: EGD (ESOPHAGOGASTRODUODENOSCOPY);  Surgeon: Javier Farmer MD;  Location: [facility] ENDO;  Service: Endoscopy;  Laterality: N/A;    INJECTION OF ANESTHETIC AGENT AROUND MEDIAL BRANCH NERVES INNERVATING LUMBAR FACET JOINT Bilateral 10/12/2020    Procedure: Bilateral L3-5 MBB;  Surgeon: Tacho Trivedi MD;  Location: [facility];  Service: Pain Management;  Laterality: Bilateral;    INJECTION OF ANESTHETIC AGENT AROUND MEDIAL BRANCH NERVES INNERVATING LUMBAR FACET JOINT Bilateral 12/21/2020    Procedure: Bilateral L3-5 MBB with steroid;  Surgeon: Tacho Trivedi MD;  Location: [facility];  Service: Pain Management;  Laterality: Bilateral;    INSERTION OF SPINAL NEUROSTIMULATOR N/A 3/23/2023    Procedure: INSERTION, NEUROSTIMULATOR, SPINAL;  Surgeon: Philipp Demarco MD;  Location: [facility] OR;  Service: Pain Management;  Laterality: N/A;    INTRALUMINAL  GASTROINTESTINAL TRACT IMAGING VIA CAPSULE N/A 12/29/2021    Procedure: IMAGING PROCEDURE, GI TRACT, INTRALUMINAL, VIA CAPSULE;  Surgeon: Tahir Geronimo RN;  Location: [facility] ENDO;  Service: Endoscopy;  Laterality: N/A;    PROSTATE SURGERY      prostate radiation    RADIOFREQUENCY THERMOCOAGULATION Left 6/4/2021    Procedure: Left L3-5 Lumbar RFA;  Surgeon: Tacho Trivedi MD;  Location: [facility] PAIN MGT;  Service: Pain Management;  Laterality: Left;    RADIOFREQUENCY THERMOCOAGULATION Right 6/18/2021    Procedure: Right L3-5 Lumbar RFA;  Surgeon: Tacho Trivedi MD;  Location: [facility] PAIN MGT;  Service: Pain Management;  Laterality: Right;    REPLACEMENT OF PACEMAKER GENERATOR Left 6/16/2022    Procedure: REPLACEMENT, PULSE GENERATOR, CARDIAC PACEMAKER/CRT-P device;  Surgeon: Darrel Carrero MD;  Location: [facility]ATH [facility];  Service: Cardiology;  Laterality: Left;  NOT MRI safe   Pacer and leads implanted 9/30/2017, Dr. Souleymane CARORLLT Consulta CRT-P C4TR01, [id number]   A lead: Mdt 5076 CapSureFix® Novus, [id number]   RV lead: Mdt 5076 CapSureFix® Novus, [id number]   LV lead: Mdt 4196 At    SELECTIVE INJECTION OF ANESTHETIC AGENT AROUND LUMBAR SPINAL NERVE ROOT BY TRANSFORAMINAL APPROACH Bilateral 6/8/2022    Procedure: Bilateral L3/4 TF XUAN with RN IV sedation;  Surgeon: Philipp Demarco MD;  Location: [facility] PAIN MGT;  Service: Pain Management;  Laterality: Bilateral;    SPINE SURGERY      fusion    TONSILLECTOMY      TRIAL OF SPINAL CORD NERVE STIMULATOR N/A 1/25/2023    Procedure: Trial, Neurostimulator, Spinal Cord with RN IV sedation;  Surgeon: Philipp Demarco MD;  Location: [facility] PAIN MGT;  Service: Pain Management;  Laterality: N/A;         Family History:  Family History   Problem Relation Age of Onset    Heart attack Mother     Diabetes Mother     Heart disease Mother     Cataracts Mother     Stroke Father     Heart disease Father     Heart disease Brother        Social History:  Social History     Tobacco Use     Smoking status: Former     Packs/day: 1.50     Years: 20.00     Pack years: 30.00     Types: Cigarettes     Start date:      Quit date:      Years since quittin.4    Smokeless tobacco: Never   Substance and Sexual Activity    Alcohol use: No    Drug use: No    Sexual activity: Not Currently        Review of Systems     Review of Systems   Constitutional:  Positive for fatigue. Negative for chills and fever.   HENT:  Negative for sore throat.    Respiratory:  Negative for shortness of breath.    Cardiovascular:  Negative for chest pain.   Gastrointestinal:  Positive for diarrhea. Negative for nausea and vomiting.   Genitourinary:  Negative for dysuria.   Musculoskeletal:  Negative for back pain.        (+) Swelling (BLE)   Skin:  Negative for rash.   Neurological:  Positive for weakness (generalized). Negative for light-headedness, numbness and headaches.   Hematological:  Does not bruise/bleed easily.   All other systems reviewed and are negative.     Physical Exam     Initial Vitals [23 1403]   BP Pulse Resp Temp SpO2   (!) 144/50 67 18 98.1 °F (36.7 °C) 96 %      MAP       --          Physical Exam  Nursing Notes and Vital Signs Reviewed.  Constitutional: Patient is in no acute distress. Chronically ill appearing.  Head: Atraumatic. Normocephalic.  Eyes: PERRL. EOM intact. Conjunctivae are not pale. No scleral icterus.  ENT: Mucous membranes are moist. Oropharynx is clear and symmetric.    Neck: Supple. Full ROM.   Cardiovascular: Regular rate. Regular rhythm. No murmurs, rubs, or gallops. Distal pulses are 2+ and symmetric.  Pulmonary/Chest: No respiratory distress. Clear to auscultation bilaterally. No wheezing or rales.  Abdominal: Soft and non-distended.  There is no tenderness.  No rebound, guarding, or rigidity.   Musculoskeletal: Moves all extremities. No obvious deformities. 2+ BLE edema.   Skin: Warm and dry.  Neurological:  Alert, awake, and appropriate.  Normal speech.  No acute focal  "neurological deficits are appreciated.  Psychiatric: Normal affect. Good eye contact. Appropriate in content.     ED Course   Procedures  ED Vital Signs:  Vitals:    05/29/23 1403 05/29/23 1434 05/29/23 1445 05/29/23 1530   BP: (!) 144/50  139/64 (!) 112/57   Pulse: 67 61 66 60   Resp: 18  20 (!) 25   Temp: 98.1 °F (36.7 °C)      TempSrc: Oral      SpO2: 96%  98% 99%   Weight: 99.8 kg (220 lb)      Height: 5' 10" (1.778 m)       05/29/23 1600 05/29/23 1641 05/29/23 1643 05/29/23 1645   BP: (!) 113/57 (!) 119/56 135/63 136/64   Pulse: 62 65 70 66   Resp: (!) 24 17 (!) 23 (!) 23   Temp:       TempSrc:       SpO2: 99% 98% 95% 95%   Weight:       Height:        05/29/23 1700 05/29/23 1920   BP: 139/63 (!) 142/77   Pulse: 60 66   Resp: (!) 25 17   Temp:  98.3 °F (36.8 °C)   TempSrc:  Oral   SpO2: 97% 97%   Weight:     Height:         Abnormal Lab Results:  Labs Reviewed   CULTURE, URINE - Abnormal; Notable for the following components:       Result Value    Urine Culture, Routine   (*)     Value: GRAM NEGATIVE KATYA  > 100,000 cfu/ml  Identification and susceptibility pending      All other components within normal limits    Narrative:     Specimen Source->Urine   CBC W/ AUTO DIFFERENTIAL - Abnormal; Notable for the following components:    RBC 3.03 (*)     Hemoglobin 8.8 (*)     Hematocrit 29.4 (*)     MCHC 29.9 (*)     RDW 17.4 (*)     Immature Granulocytes 0.7 (*)     Immature Grans (Abs) 0.05 (*)     Lymph # 0.4 (*)     Gran % 78.8 (*)     Lymph % 5.9 (*)     All other components within normal limits   COMPREHENSIVE METABOLIC PANEL - Abnormal; Notable for the following components:    Potassium 3.1 (*)     Chloride 113 (*)     CO2 17 (*)     Glucose 148 (*)     BUN 46 (*)     Creatinine 2.2 (*)     Calcium 8.5 (*)     Albumin 3.0 (*)     eGFR 30 (*)     All other components within normal limits   TROPONIN I - Abnormal; Notable for the following components:    Troponin I 0.074 (*)     All other components within normal " limits   B-TYPE NATRIURETIC PEPTIDE - Abnormal; Notable for the following components:     (*)     All other components within normal limits   URINALYSIS, REFLEX TO URINE CULTURE - Abnormal; Notable for the following components:    Appearance, UA Hazy (*)     Protein, UA 1+ (*)     Occult Blood UA 2+ (*)     Leukocytes, UA 2+ (*)     All other components within normal limits    Narrative:     Specimen Source->Urine   URINALYSIS MICROSCOPIC - Abnormal; Notable for the following components:    RBC, UA 47 (*)     WBC, UA 41 (*)     WBC Clumps, UA Many (*)     Hyaline Casts, UA 5 (*)     All other components within normal limits    Narrative:     Specimen Source->Urine   MAGNESIUM        All Lab Results:  Results for orders placed or performed during the hospital encounter of 05/29/23   Urine culture    Specimen: Urine   Result Value Ref Range    Urine Culture, Routine (A)      GRAM NEGATIVE KATYA  > 100,000 cfu/ml  Identification and susceptibility pending     CBC auto differential   Result Value Ref Range    WBC 7.33 3.90 - 12.70 K/uL    RBC 3.03 (L) 4.60 - 6.20 M/uL    Hemoglobin 8.8 (L) 14.0 - 18.0 g/dL    Hematocrit 29.4 (L) 40.0 - 54.0 %    MCV 97 82 - 98 fL    MCH 29.0 27.0 - 31.0 pg    MCHC 29.9 (L) 32.0 - 36.0 g/dL    RDW 17.4 (H) 11.5 - 14.5 %    Platelets 252 150 - 450 K/uL    MPV 10.1 9.2 - 12.9 fL    Immature Granulocytes 0.7 (H) 0.0 - 0.5 %    Gran # (ANC) 5.8 1.8 - 7.7 K/uL    Immature Grans (Abs) 0.05 (H) 0.00 - 0.04 K/uL    Lymph # 0.4 (L) 1.0 - 4.8 K/uL    Mono # 1.0 0.3 - 1.0 K/uL    Eos # 0.1 0.0 - 0.5 K/uL    Baso # 0.03 0.00 - 0.20 K/uL    nRBC 0 0 /100 WBC    Gran % 78.8 (H) 38.0 - 73.0 %    Lymph % 5.9 (L) 18.0 - 48.0 %    Mono % 13.1 4.0 - 15.0 %    Eosinophil % 1.1 0.0 - 8.0 %    Basophil % 0.4 0.0 - 1.9 %    Differential Method Automated    Comprehensive metabolic panel   Result Value Ref Range    Sodium 141 136 - 145 mmol/L    Potassium 3.1 (L) 3.5 - 5.1 mmol/L    Chloride 113 (H) 95 -  110 mmol/L    CO2 17 (L) 23 - 29 mmol/L    Glucose 148 (H) 70 - 110 mg/dL    BUN 46 (H) 8 - 23 mg/dL    Creatinine 2.2 (H) 0.5 - 1.4 mg/dL    Calcium 8.5 (L) 8.7 - 10.5 mg/dL    Total Protein 7.1 6.0 - 8.4 g/dL    Albumin 3.0 (L) 3.5 - 5.2 g/dL    Total Bilirubin 0.4 0.1 - 1.0 mg/dL    Alkaline Phosphatase 78 55 - 135 U/L    AST 32 10 - 40 U/L    ALT 27 10 - 44 U/L    Anion Gap 11 8 - 16 mmol/L    eGFR 30 (A) >60 mL/min/1.73 m^2   Troponin I #1   Result Value Ref Range    Troponin I 0.074 (H) 0.000 - 0.026 ng/mL   BNP   Result Value Ref Range     (H) 0 - 99 pg/mL   Magnesium   Result Value Ref Range    Magnesium 2.0 1.6 - 2.6 mg/dL   Urinalysis, Reflex to Urine Culture Urine, Catheterized    Specimen: Urine   Result Value Ref Range    Specimen UA Urine, Catheterized     Color, UA Yellow Yellow, Straw, Beth    Appearance, UA Hazy (A) Clear    pH, UA 6.0 5.0 - 8.0    Specific Gravity, UA 1.010 1.005 - 1.030    Protein, UA 1+ (A) Negative    Glucose, UA Negative Negative    Ketones, UA Negative Negative    Bilirubin (UA) Negative Negative    Occult Blood UA 2+ (A) Negative    Nitrite, UA Negative Negative    Urobilinogen, UA Negative <2.0 EU/dL    Leukocytes, UA 2+ (A) Negative   Urinalysis Microscopic   Result Value Ref Range    RBC, UA 47 (H) 0 - 4 /hpf    WBC, UA 41 (H) 0 - 5 /hpf    WBC Clumps, UA Many (A) None-Rare    Bacteria Occasional None-Occ /hpf    Hyaline Casts, UA 5 (A) 0-1/lpf /lpf    Microscopic Comment SEE COMMENT      *Note: Due to a large number of results and/or encounters for the requested time period, some results have not been displayed. A complete set of results can be found in Results Review.       Imaging Results:  Imaging Results              X-Ray Chest AP Portable (Final result)  Result time 05/29/23 14:28:51      Final result by José Antonio Jason MD (05/29/23 14:28:51)                   Impression:      1.  Negative for acute process involving the chest.    2.  Stable findings as  noted above.      Electronically signed by: José Antonio Jason MD  Date:    05/29/2023  Time:    14:28               Narrative:    EXAMINATION:  XR CHEST AP PORTABLE    CLINICAL HISTORY:  weakness;    COMPARISON:  May 14, 2023, as well as studies dating back to May 11, 2021    FINDINGS:  The study is lordotic in position.  Stable scarring or chronic atelectasis in the lung bases.  The lungs are free of new pulmonary opacities.  The cardiac silhouette size is enlarged.  The trachea is midline and the mediastinal width is normal. Negative for focal infiltrate, effusion or pneumothorax. Pulmonary vasculature is normal. Negative for osseous abnormalities. Three lead left subclavian pacemaker again seen.  Stable lower thoracic spine stimulator, tortuous aorta, aortic arch calcifications, median sternotomy wires, CABG changes, coronary artery stents and cardiophrenic fat pads.                                       The EKG was ordered, reviewed, and independently interpreted by the ED provider.  Interpretation time: 14:32  Rate: 62 BPM  Rhythm: Ventricular-paced rhythm  Interpretation: Biventricular pacemaker detected. No STEMI.           The Emergency Provider reviewed the vital signs and test results, which are outlined above.     ED Discussion       4:00 PM: Dr. Mann transfers care of patient to Dr. Hernandez pending lab results.    6:54 PM: Reassessed pt at this time. Discussed with pt all pertinent ED information and results. Discussed pt dx and plan of tx. Gave pt all f/u and return to the ED instructions. All questions and concerns were addressed at this time. Pt expresses understanding of information and instructions, and is comfortable with plan to discharge. Pt is stable for discharge.    I discussed with patient and/or family/caretaker that evaluation in the ED does not suggest any emergent or life threatening medical conditions requiring immediate intervention beyond what was provided in the ED, and I believe  patient is safe for discharge.  Regardless, an unremarkable evaluation in the ED does not preclude the development or presence of a serious of life threatening condition. As such, patient was instructed to return immediately for any worsening or change in current symptoms.        Medical Decision Making:   History:   I obtained history from: someone other than patient.       <> Summary of History: Wife reports fatigue, weakness, and chronic diarrhea.  Old Records Summarized: records from previous admission(s).       <> Summary of Records: Admitted earlier this month for frequent falls, weakness, VAIBHAV on CKD, anemia, and CHF.  He was discharged to [facility] for rehabilitation and has been doing well.  Differential Diagnosis:   VAIBHAV, electrolyte derangement, ACS, CHF, pneumonia, UTI, sepsis  Clinical Tests:   Lab Tests: Ordered and Reviewed  Radiological Study: Ordered and Reviewed  Medical Tests: Ordered and Reviewed  ED Management:  82-year-old male with a history of AFib, CHF, CAD, diabetes, hyperlipidemia, hypertension, MI, and dementia presents with wife reporting fatigue.  Patient is currently at [facility] after a hospital stay earlier this month for weakness, VAIBHAV on CKD, and CHF exacerbation.  He was discharged to [facility] rehabilitation including physical therapy.  He has been doing well and was granted a weekend home with his wife.  She states that the 1st day went well but the remainder of the weekend he slept more and had increasing weakness.  She also reports diarrhea but this is chronic.  On my exam, patient is sleeping but arousable and answers questions appropriately.  Alert and oriented x3.  He is moving all extremities and has no focal neurological deficits although he does appear to be generally weak.  Heart is regular rate and rhythm.  Lungs are clear and equal bilaterally.  Abdomen is soft without tenderness.  He has bilateral lower leg pitting edema.  His EKG shows a paced rhythm in  his troponin is continuing to trend downward from previous admission.  BNP is elevated but Chest x-ray is negative for pulmonary edema or infiltrate.  CBC shows no leukocytosis with a baseline H&H.  CMP shows baseline renal function with a normal anion gap and mild hypokalemia.  Magnesium normal.  He was given p.o. magnesium replacement in the emergency department.  This is likely from the chronic diarrhea which intermittently improves with Imodium.  UA shows signs of infection which is likely the cause of his increased fatigue and weakness.  He was treated with IV Rocephin and will be discharged with Omnicef prescription.  I will also write for a probiotic to help with diarrhea.  Patient is receiving appropriate rehabilitation harvest manner and I believe he is stable for discharge to the rehab center.  Patient likely declined over the weekend given his infection and lack of physical therapy.            ED Medication(s):  Medications   potassium bicarbonate disintegrating tablet 20 mEq (20 mEq Oral Given 5/29/23 1520)   cefTRIAXone (ROCEPHIN) 1 g in dextrose 5 % in water (D5W) 5 % 50 mL IVPB (MB+) (0 g Intravenous Stopped 5/29/23 1817)       Discharge Medication List as of 5/29/2023  6:55 PM        START taking these medications    Details   cefdinir (OMNICEF) 300 MG capsule Take 1 capsule (300 mg total) by mouth 2 (two) times daily. for 7 days, Starting Mon 5/29/2023, Until Mon 6/5/2023, Print      L.acidophil,parac-S.therm-Bif. (RISAQUAD) Cap capsule Take 1 capsule by mouth once daily., Starting Mon 5/29/2023, Print              Follow-up Information       Byron Stevens MD In 1 day.    Specialty: Family Medicine  Contact information:  [address] [place] [id number]  [phone]                                 Scribe Attestation:   Scribe #1: I performed the above scribed service and the documentation accurately describes the services I performed. I attest to the accuracy of the note.      Attending:   Physician Attestation Statement for Scribe #1: I, Diamond Mann MD, personally performed the services described in this documentation, as scribed by Saskia Mckeon, in my presence, and it is both accurate and complete.       Scribe Attestation:   Scribe #2: I performed the above scribed service and the documentation accurately describes the services I performed. I attest to the accuracy of the note.    Attending Attestation:           Physician Attestation for Scribe:    Physician Attestation Statement for Scribe #2: I, Merari Hernandez DO, reviewed documentation, as scribed by Jamir Ridley in my presence, and it is both accurate and complete. I also acknowledge and confirm the content of the note done by Charletteibe #1.         Clinical Impression       ICD-10-CM ICD-9-CM   1. Urinary tract infection with hematuria, site unspecified  N39.0 599.0    R31.9 599.70   2. Weakness  R53.1 780.79   3. Diarrhea, unspecified type  R19.7 787.91   4. Hypokalemia  E87.6 276.8       Disposition:   Disposition: Discharged  Condition: Stable       Merari Hernandez DO  05/31/23 1602

## 2023-05-31 NOTE — PROGRESS NOTES
Pt was contacted twice, with no answer each time. Encounter will be closed.    Vonnie Gant  [phone]

## 2023-06-08 NOTE — PROGRESS NOTES
Subjective:   Patient ID:  Jake Ortiz is a 82 y.o. male who presents for cardiac consult of No chief complaint on file.          The patient came in today for cardiac consult of No chief complaint on file.      Jake Ortiz is a 82 y.o. male pt with CAD s/p CABG, old MI, CHF with TR/MR, AVB s/p CRT, PAF on Eliquis, Anemia, HTN, HLD, DM2, NASREEN, CKD4, GERD, Restrictive/obstructive lung disease here for follow up CV care.    4/21/20  Pt seen at [facility] last by CARMELINA Spencer 9/18/19. Overall feels great for the most part. He has been started to gain some water weight in the past, he had been on Entreso by Dr. Menendez, lost 173 lbs. He had paracentesis in past was told due to CHF. His weight started coming back up to 208 lbs and then was gaining weight and he has doubled his meds - Entresto. Otherwise feels well has good energy. He changed here due to insurance.   BP - 108/67, pulse 62, oxygen 98%, weight 209 lbs, blood sugar 127    5/22/20  BNP was neg after last visit, Cr slightly increased with BUN elevated told to take lasix daily and PRN extra. He saw Dr. Holley yesterday, was given Flonase breathing improved.   /120s systolics, 67 diastolic, pulse 60s. . Overall doing will. Needs device clinic.     7/16/20  ECHO with EF 50%, mild to mod MR/TR. Pt had trouble getting Entresto filled due to being in donut hole but patient assistance form filled out.   He has significant back pain. He is euvolemic, no CP/SOB.   ECG - bi V paced    9/24/20 - hosp f/u  He was admitted for lower GI bleed and acute blood loss anemia. Initial H/H of 5.1/16.9.  Patient was transfused 2 units of PRBCs. GI consulted. Will hold anticoagulations. Today H/H 7.4/23.3. Case  discussed with Dr. Farmer, will need  EGD and colonoscopy to evaluate further and given recent anticoagulation to decrease the risk of bleeding from the procedures will plan for the procedures in 5 days. As of 9/20 pt had a bowel movement with red blood  yesterday morning, no further episodes reported. This morning H/H 6.7/21.5, will transfuse 2 units of PRBCs. Plans for EGD/colonoscopy on Tuesday. As of 9/21 no active bleeding noted. H/H 9.0/28.0. Continue to monitor. Plan for procedure tomorrow. 09/22: Patient had EGD that showed gastritis which was biopsied. Discussed with GI who recommends dc home today, restart anti coagulation tomorrow, and f/u OP for video capsule study. GI will arrange OP f/u and call patient. New Rx for Pantoprazole 40mg daily sent to pharmacy.   He has restarted taking Eliqis    11/3/20  He has been to ER few times with fatigue and symptomatic anemia, transfused 10/23/20 and then again last Sunday 11/2/20. He cannot get Video capsule study due to pacemaker. He will see Dr. Nath for further workup tx. He has severe back pain, had injection which improved sx will get RFA. Will change Entresto due to Losartan, has trouble affording some meds, ELiquis is also expensive but prefer over coumadin.     2/11/21  He has gotten both COVID vaccines, he is pleased with the process. He has been doing well overall. No CP/SOB. BP and HR well controlled.   He had renal eval and told may need more Losartan.     5/6/21  BP and HR is well controlled.   ECG - V paced, Biv paced    From Device check  saw this pt in clinic in February. He appears to be symptomatic to Follansbee alert for fluid overload. He confirms taking Lasix BID. He reports having 3L of fluid taken off of him a year ago in the hospital. He has AF and is currently undersensing some of the episodes. We will attempt to address this in clinic. He is a CRT-P and his BiV pacing has decreased to 91.8%, which in order to be effective is preferred at 95%. He does not see an EP.    Today he has fatigue and back pain. He has had crawfish a few times last few months.     7/15/21  Last device interrog with AF with freq mode switch and inc Optivol.     Per Dr. Walton-    Last week he was found to have a  critical low hemoglobin of 5.8.  Sent to the ER and had 2 units packed red blood cells transfused.  Has had a problem with chronic anemia with multiple transfusions in the past.  Last EGD and colonoscopy were negative.  No small bowel studies were done at that time.  Also followed by hematology for iron infusions.    He will see heme/onc next week.     10/28/21  From pt - I have some shortness of breath, but I have been under the weather. I've seen Dr. Walton last Friday. Feeling bloated, took a potassium tablet last night. Very little coughing today, starting to dry up. See you tomorrow.  His feet have been getting too tight in shoes.     11/11/21  He went to a recent party where he was very active and feeling well now. He had recent URI sx imporve. Weight 205-207 lbs. BP and HR stable.   ECG - V paced, Bi V paced    Hospital Course:   Patient was kept on OBS for symptomatic anemia under the care of hospital medicine.  12/23: Hgb increased to 7.4 overnight with transfusion of 2u pRBC. Pt is still very tired. Troponin increased to 0.118, pt mukesh CP, palpitations, but admits to SOB with exertion. cardiology consulted. GI feels like no indication for intervention, will continue OP f/u for VCE that is scheduled for next week, they have signed off. Heme/onc following - will start IV iron and continue OP f/u as scheduled.  12/24/21 Hemoglobin stable. Will have VCE completed outpatient per GI. Okay to resume Eliquis 2.5mg daily BID. He was asked to hold Plavix and start ASA for CAD until after GI test.     12/30/21  BP 140s/70s. HR 80s. Pt had recent admission for anemia with elevated trop, s/p PRBC, is on Eliquis 2.5 BID, DAPT on hold. He is taking only half tab Eliquis BID.     Component Ref Range & Units 3 wk ago   (12/30/21) 1 mo ago   (12/17/21) 2 mo ago   (10/28/21) 8 mo ago   (5/11/21) 1 yr ago   (4/27/20) 3 yr ago   (7/31/18) 3 yr ago   (3/5/18)   BNP 0 - 99 pg/mL 471 High   222 High  CM  282 High  CM             1/20/22  BP and HR stable today. BNP was elevated told pt - labs reveal elevated BNP due to extra fluid, need to double up lasix to 40mg twice a day until breathing is improving and have less swelling/weight, continue monitoring bP and weights daily and low salt diet, follow up with me in 2-3 weeks will adjust meds further. Kidney function, potassium and magnesium are stable.   He still has more back pain, sees Dr. Gamez. His breathing is better and energy is improved. He is taking iron tabs. His video capsule was neg. No further bleeding issues.   219 lbs --> 206 lbs today     2/10/22  Pt had eval with Dr. Price will be a candidate for Watchman. Labs from last visit - labs reveal low potassium and magnesium, start taking potassium 20 meq and magnesium 400mg daily. BNP has improved.   BP and HR stable now. He wants to get Watchman in [place] possible. Breathing has improved, weight is coming down.     4/28/22  BP and HR stable. He has been gaining more weight lately, about 6 lbs, he has more KING as well. His device interrogation .  OptiVol WNL, possible fluid build up Feb 7 - May 22nd.  Pt has congestive heart failure and cardiomegaly.  Well-functioning device.     8/11/22  Pt had CRT-P Gen change 6/2022 due to battery depletion . He had recent eval with Dr. Moody regarding banding hemorrhoids if anemia/bleeding is worse but may need EGD/Cscope with GI in the interim. PT remains on Eliquis 2.5 mg BID, not on ASA now. He has significant back pain can't walk as much, he is seeing Dr. Demarco.   BP low normal, HR 96.     10/27/22  Pt has upcoming spinal cord stim on Nevro 11/16/22 with Dr. Demarco. BP and Hr well controlled. BMI 28 - 190 lbs. Recent device interrogation with John EMERSON, had CHF exac mid aug.     5/3/23  He still has back pain now since spinal cord stim, he will f/u with pain management again. BP and HR stable.   CHF: OptiVol WNL, possible fluid build up Jan 22 - Feb 3, 2023; pt asymptomatic.  Pt has  congestive heart failure and cardiomegaly.   ECG - V paced  Feels ok now. Takes 2 lasix in AM and once in evening. BMI 31 - 212 lbs.       6/8/23  ECHO 5/2023 with improved EF 50%, normal RV function, mild to mod MR, mild to mod TR, PASP > 38 mmHg.     From pt - Dr. Hood - [facility] and am working on my getting my strength back.  I have improved a great deal.  I know that you do not visit the center so I wanted to make you aware that since I have been here (Wednesday 5/17)  I have been on 20mg, 1 time per day lasics.  Yesterday they increased to 20mg, 2 times per day but only for 3 days.  My feet are very swollen and that makes walking difficult.  I just want you to know what is going on.  I do not have any idea who is making these decisions on my behalf.  Are you being consulted?  Please advise.  Thank you.    82-year-old male with a history of AFib, CHF, CAD, diabetes, hyperlipidemia, hypertension, MI, and dementia presents with wife reporting fatigue.  Patient is currently at [facility] after a hospital stay earlier this month for weakness, VAIBHAV on CKD, and CHF exacerbation.  He was discharged to [facility] rehabilitation including physical therapy.  He has been doing well and was granted a weekend home with his wife.  She states that the 1st day went well but the remainder of the weekend he slept more and had increasing weakness.  She also reports diarrhea but this is chronic.  On my exam, patient is sleeping but arousable and answers questions appropriately.  Alert and oriented x3.  He is moving all extremities and has no focal neurological deficits although he does appear to be generally weak.  Heart is regular rate and rhythm.  Lungs are clear and equal bilaterally.  Abdomen is soft without tenderness.  He has bilateral lower leg pitting edema.  His EKG shows a paced rhythm in his troponin is continuing to trend downward from previous admission.  BNP is elevated but Chest x-ray  is negative for pulmonary edema or infiltrate.  CBC shows no leukocytosis with a baseline H&H.  CMP shows baseline renal function with a normal anion gap and mild hypokalemia.  Magnesium normal.  He was given p.o. magnesium replacement in the emergency department.  This is likely from the chronic diarrhea which intermittently improves with Imodium.  UA shows signs of infection which is likely the cause of his increased fatigue and weakness.  He was treated with IV Rocephin and will be discharged with Omnicef prescription.  I will also write for a probiotic to help with diarrhea.  Patient is receiving appropriate rehabilitation harvest manner and I believe he is stable for discharge to the rehab center.  Patient likely declined over the weekend given his infection and lack of physical therapy.     Pt went to ER last week. Trop trending down. BNP remains elevated. Pt was admitted in May had PRBC tranfusion, lasix held and restarted at lasix 20mg daily with nephro eval for CKD4.       BNP (pg/mL)   Date Value   05/29/2023 376 (H)   05/12/2023 285 (H)   05/10/2023 345 (H)   05/03/2023 161 (H)   05/11/2022 276 (H)      PT is feeling worse now, more lethargic has KING with LE edema, not diuresing well with PO meds, rec IV diuersis and monitor I/O and renal function and may need PRBC transfusion.     Patient is compliant with medications.    Results for orders placed during the hospital encounter of 05/10/23    Echo    Interpretation Summary  · Eccentric hypertrophy and low normal systolic function.  · Moderate left atrial enlargement.  · The estimated ejection fraction is 50%.  · Indeterminate left ventricular diastolic function.  · There is abnormal septal wall motion consistent with left bundle branch block.  · Normal right ventricular size with normal right ventricular systolic function.  · Mild-to-moderate mitral regurgitation.  · Mild to moderate tricuspid regurgitation.  The estimated PA systolic pressure is greater  than 38 mmHg.        Results for orders placed during the hospital encounter of 04/28/22    Echo    Interpretation Summary  · The left ventricle is normal in size with concentric hypertrophy and moderately decreased systolic function.  · The estimated ejection fraction is 35%.  · Grade III left ventricular diastolic dysfunction.  · Mild right ventricular enlargement with moderately reduced right ventricular systolic function.  · Mild left atrial enlargement.  · Mild right atrial enlargement.  · There is mild aortic valve stenosis.  · Aortic valve area is 1.53 cm2; peak velocity is 1.78 m/s; mean gradient is 7 mmHg.  · Mild mitral regurgitation.  · Mild tricuspid regurgitation.  · Elevated central venous pressure (15 mmHg).  · The estimated PA systolic pressure is 37 mmHg.  · There is abnormal septal wall motion. There is systolic and diastolic flattening of the interventricular septum consistent with right ventricle pressure and volume overload.  · There is moderate left ventricular global hypokinesis.      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    #4 PROSTATE BIOPSY, LEFT APEX: ADENOCARCINOMA, FARHAD GRADE 3 + 3 = 6, IN 1 of 2 CORES 9/8/2014    Anemia     Arthritis     Atrial fibrillation 10/19/2017    Back pain     Congestive heart failure 03/05/2018    Coronary artery disease     DM (diabetes mellitus) 2018     am 06/23/2020    DM (diabetes mellitus) 2016     am 08/17/2021    Hyperlipemia     Hypertension     Myocardial infarction     NASREEN (obstructive sleep apnea) 06/05/2018    Prostate cancer 2015    Tobacco dependence     Type 2 diabetes mellitus        Past Surgical History:   Procedure Laterality Date    COLONOSCOPY N/A 9/22/2020    Procedure: COLONOSCOPY;  Surgeon: Javier Farmer MD;  Location: [facility];  Service: Endoscopy;  Laterality: N/A;    CORONARY ARTERY BYPASS GRAFT  1987    EPIDURAL STEROID INJECTION N/A 11/22/2019     Procedure: Lumbar L5/S1 IL XUAN;  Surgeon: Tacho Trivedi MD;  [facility];  Service: Pain Management;  Laterality: N/A;    EPIDURAL STEROID INJECTION N/A 1/6/2020    Procedure: Lumbar L5/S1 IL XUAN;  Surgeon: Tacho Trivedi MD;  [facility];  Service: Pain Management;  Laterality: N/A;    EPIDURAL STEROID INJECTION N/A 2/10/2020    Procedure: Caudal XUAN;  Surgeon: Tacho Trivedi MD;  [facility];  Service: Pain Management;  Laterality: N/A;    ESOPHAGOGASTRODUODENOSCOPY N/A 9/22/2020    Procedure: EGD (ESOPHAGOGASTRODUODENOSCOPY);  Surgeon: Javier Farmer MD;  Location: [facility] ENDO;  Service: Endoscopy;  Laterality: N/A;    INJECTION OF ANESTHETIC AGENT AROUND MEDIAL BRANCH NERVES INNERVATING LUMBAR FACET JOINT Bilateral 10/12/2020    Procedure: Bilateral L3-5 MBB;  Surgeon: Tacho Trivedi MD;  Location: [facility] PAIN MGT;  Service: Pain Management;  Laterality: Bilateral;    INJECTION OF ANESTHETIC AGENT AROUND MEDIAL BRANCH NERVES INNERVATING LUMBAR FACET JOINT Bilateral 12/21/2020    Procedure: Bilateral L3-5 MBB with steroid;  Surgeon: Tacho Trivedi MD;  Location: [facility] PAIN MGT;  Service: Pain Management;  Laterality: Bilateral;    INSERTION OF SPINAL NEUROSTIMULATOR N/A 3/23/2023    Procedure: INSERTION, NEUROSTIMULATOR, SPINAL;  Surgeon: Philipp Demarco MD;  Location: [facility] OR;  Service: Pain Management;  Laterality: N/A;    INTRALUMINAL GASTROINTESTINAL TRACT IMAGING VIA CAPSULE N/A 12/29/2021    Procedure: IMAGING PROCEDURE, GI TRACT, INTRALUMINAL, VIA CAPSULE;  Surgeon: Tahir Geronimo RN;  Location: [facility] ENDO;  Service: Endoscopy;  Laterality: N/A;    PROSTATE SURGERY      prostate radiation    RADIOFREQUENCY THERMOCOAGULATION Left 6/4/2021    Procedure: Left L3-5 Lumbar RFA;  Surgeon: Tacho Trivedi MD;  [facility];  Service: Pain Management;  Laterality: Left;    RADIOFREQUENCY THERMOCOAGULATION Right 6/18/2021    Procedure: Right L3-5  Lumbar RFA;  Surgeon: Tacho Trivedi MD;  Location: [facility] PAIN MGT;  Service: Pain Management;  Laterality: Right;    REPLACEMENT OF PACEMAKER GENERATOR Left 2022    Procedure: REPLACEMENT, PULSE GENERATOR, CARDIAC PACEMAKER/CRT-P device;  Surgeon: Darrel Carrero MD;  Location: [facility] CATH LAB;  Service: Cardiology;  Laterality: Left;  NOT MRI safe   Pacer and leads implanted 2017, Dr. Souleymane CARROLLT Consulta CRT-P C4TR01, [id number]   A lead: Mdt 5076 CapSureFix® Novus, [id number]   RV lead: Mdt 5076 CapSureFix® Novus, [id number]   LV lead: Jovan 4196 At    SELECTIVE INJECTION OF ANESTHETIC AGENT AROUND LUMBAR SPINAL NERVE ROOT BY TRANSFORAMINAL APPROACH Bilateral 2022    Procedure: Bilateral L3/4 TF XUAN with RN IV sedation;  Surgeon: Philipp Demarco MD;  Location: [facility] PAIN MGT;  Service: Pain Management;  Laterality: Bilateral;    SPINE SURGERY      fusion    TONSILLECTOMY      TRIAL OF SPINAL CORD NERVE STIMULATOR N/A 2023    Procedure: Trial, Neurostimulator, Spinal Cord with RN IV sedation;  Surgeon: Philipp Demarco MD;  Location: [facility] PAIN MGT;  Service: Pain Management;  Laterality: N/A;       Social History     Tobacco Use    Smoking status: Former     Packs/day: 1.50     Years: 20.00     Pack years: 30.00     Types: Cigarettes     Start date:      Quit date:      Years since quittin.4    Smokeless tobacco: Never   Substance Use Topics    Alcohol use: No    Drug use: No       Family History   Problem Relation Age of Onset    Heart attack Mother     Diabetes Mother     Heart disease Mother     Cataracts Mother     Stroke Father     Heart disease Father     Heart disease Brother        Patient's Medications   New Prescriptions    No medications on file   Previous Medications    ACETAMINOPHEN (TYLENOL) 500 MG TABLET    Take 2 tablets (1,000 mg total) by mouth every 6 (six) hours as needed for Pain.    APIXABAN (ELIQUIS) 2.5 MG TAB    Take 1 tablet (2.5 mg total) by mouth 2  (two) times daily.    ATORVASTATIN (LIPITOR) 80 MG TABLET    Take 1 tablet (80 mg total) by mouth every evening.    BLOOD SUGAR DIAGNOSTIC STRP    Check blood glucose levels daily in the AM fasting and 1-2 times more daily.    CHOLECALCIFEROL, VITAMIN D3, (VITAMIN D3) 25 MCG (1,000 UNIT) CAPSULE    Take 1,000 Units by mouth once daily.    CLONAZEPAM (KLONOPIN) 1 MG TABLET    Take 1 tablet (1 mg total) by mouth nightly as needed for Anxiety.    FERROUS SULFATE (FEOSOL) 325 MG (65 MG IRON) TAB TABLET    Take 1 tablet (325 mg total) by mouth 2 (two) times daily.    FLUTICASONE PROPIONATE (FLONASE) 50 MCG/ACTUATION NASAL SPRAY    2 sprays (100 mcg total) by Each Nostril route once daily.    FUROSEMIDE (LASIX) 20 MG TABLET    Take 1 tablet (20 mg total) by mouth once daily.    KRILL/OM-3/DHA/EPA/PHOSPHO/AST (MEGARED OMEGA-3 KRILL OIL ORAL)    Take 1 capsule by mouth every morning.    L.ACIDOPHIL,PARAC-S.THERM-BIF. (RISAQUAD) CAP CAPSULE    Take 1 capsule by mouth once daily.    LANCETS MISC    Check blood glucose levels daily in the AM fasting and 1-2 times more daily.    LEVOCETIRIZINE (XYZAL) 5 MG TABLET    Take 1 tablet (5 mg total) by mouth every evening.    LOSARTAN (COZAAR) 50 MG TABLET    Take 1 tablet (50 mg total) by mouth once daily.    MULTIVITAMIN CAPSULE    Take 1 capsule by mouth once daily. Takes once a week    PANTOPRAZOLE (PROTONIX) 40 MG TABLET    Take 1 tablet (40 mg total) by mouth once daily.    POTASSIUM CHLORIDE SA (K-DUR,KLOR-CON) 20 MEQ TABLET    Take 20 mEq by mouth.    PREGABALIN (LYRICA) 75 MG CAPSULE    Take 1 capsule QHS x 1 week, then increase to BID (if tolerated).    PYRIDOXINE, VITAMIN B6, (B-6) 100 MG TAB    Take 50 mg by mouth once daily.    VIT A/VIT C/VIT E/ZINC/COPPER (OCUVITE PRESERVISION ORAL)    Take 1 capsule by mouth every evening.   Modified Medications    No medications on file   Discontinued Medications    No medications on file       Review of Systems   Constitutional:   Positive for malaise/fatigue.   HENT: Negative.     Eyes: Negative.    Respiratory:  Positive for shortness of breath.    Cardiovascular:  Positive for leg swelling. Negative for chest pain.   Gastrointestinal: Negative.    Genitourinary: Negative.    Musculoskeletal: Negative.    Skin: Negative.    Neurological: Negative.    Endo/Heme/Allergies: Negative.    Psychiatric/Behavioral: Negative.     All 12 systems otherwise negative.    Wt Readings from Last 3 Encounters:   06/08/23 102.3 kg (225 lb 8.5 oz)   06/08/23 102 kg (224 lb 15.7 oz)   05/29/23 99.8 kg (220 lb)     Temp Readings from Last 3 Encounters:   06/08/23 98.7 °F (37.1 °C) (Tympanic)   05/29/23 98.3 °F (36.8 °C) (Oral)   05/16/23 98.1 °F (36.7 °C)     BP Readings from Last 3 Encounters:   06/08/23 (!) 140/74   06/08/23 139/72   05/29/23 (!) 142/77     Pulse Readings from Last 3 Encounters:   06/08/23 89   06/08/23 80   05/29/23 66       BP (!) 140/74   Pulse 89   Wt 102.3 kg (225 lb 8.5 oz)   SpO2 97%   BMI 32.36 kg/m²     Objective:   Physical Exam  Vitals and nursing note reviewed.   Constitutional:       General: He is not in acute distress.     Appearance: He is well-developed. He is not diaphoretic.   HENT:      Head: Normocephalic and atraumatic.      Nose: Nose normal.   Eyes:      General: No scleral icterus.        Right eye: No discharge.         Left eye: No discharge.      Conjunctiva/sclera: Conjunctivae normal.   Neck:      Thyroid: No thyromegaly.      Vascular: No JVD.   Cardiovascular:      Rate and Rhythm: Normal rate and regular rhythm.      Heart sounds: S1 normal and S2 normal. Murmur heard.     No friction rub. No gallop. No S3 or S4 sounds.   Pulmonary:      Effort: Pulmonary effort is normal. No respiratory distress.      Breath sounds: Normal breath sounds. No stridor. No wheezing or rales.   Chest:      Chest wall: No tenderness.   Abdominal:      General: Bowel sounds are normal. There is no distension.      Palpations:  Abdomen is soft. There is no mass.      Tenderness: There is no abdominal tenderness. There is no rebound.   Genitourinary:     Comments: Deferred  Musculoskeletal:         General: No tenderness or deformity. Normal range of motion.      Cervical back: Normal range of motion and neck supple.   Lymphadenopathy:      Cervical: No cervical adenopathy.   Skin:     General: Skin is warm and dry.      Coloration: Skin is not pale.      Findings: No erythema or rash.   Neurological:      Mental Status: He is alert and oriented to person, place, and time.      Motor: No abnormal muscle tone.      Coordination: Coordination normal.   Psychiatric:         Behavior: Behavior normal.         Thought Content: Thought content normal.         Judgment: Judgment normal.       Lab Results   Component Value Date     05/29/2023    K 3.1 (L) 05/29/2023     (H) 05/29/2023    CO2 17 (L) 05/29/2023    BUN 46 (H) 05/29/2023    CREATININE 2.2 (H) 05/29/2023     (H) 05/29/2023    HGBA1C 4.9 10/14/2022    MG 2.0 05/29/2023    AST 32 05/29/2023    ALT 27 05/29/2023    ALBUMIN 3.0 (L) 05/29/2023    PROT 7.1 05/29/2023    BILITOT 0.4 05/29/2023    WBC 7.33 05/29/2023    HGB 8.8 (L) 05/29/2023    HCT 29.4 (L) 05/29/2023    MCV 97 05/29/2023     05/29/2023    INR 1.1 06/16/2022    TSH 2.652 02/24/2023    CHOL 124 07/14/2022    HDL 43 07/14/2022    LDLCALC 54.4 (L) 07/14/2022    TRIG 133 07/14/2022     (H) 05/29/2023     Assessment:      1. Paroxysmal atrial fibrillation    2. Chronic combined systolic and diastolic congestive heart failure    3. S/P CABG x 3    4. Cardiac pacemaker in situ    5. Primary hypertension    6. Longstanding persistent atrial fibrillation    7. Lumbar radiculopathy, chronic    8. Obstructive sleep apnea    9. Iron deficiency anemia due to chronic blood loss    10. Stage 4 chronic kidney disease    11. Essential hypertension    12. Chronic diastolic congestive heart failure               Plan:   1. CAD s/p CABG x3, old MI  - cont meds - DAPT on hold due to anemia   - elevated trop sec to demand ischemia   - cont Eliquis    2. CHF EF 35%, with TR/MR, improved EF ;  --> 282 -> 222 --> 471 --> 230  --> 507 --> 276 --> 376  Weight; 219 lbs --> 206  --> 200 lbs  --> 195 lbs --> BMI 31 - 212 lbs.   - ECHO 5/2023 with improved EF 50%, normal RV function, mild to mod MR, mild to mod TR, PASP > 38 mmHg.   - cont to monitor valve disease  - changed Entresto to Losartan   - OFF Aldactone   - lasix dose decreased to Lasix 20mg PO due to CKD4 and VAIBHAV on CKD episdoes     3. Chronic Afib   - cont meds - Eliquis   - f/u Dr. Price for Watchman device - pending/on hold     [id number] score: 5  HAS-BLED score: 5    If you answer NO to any of the four criteria below, the patient does not meet the WATCHMAN implant eligibility requirements.     Patient has non-valvular atrial fibrillation: Yes  Patient has an increased risk for stroke and is recommended for oral anticoagulation (OAC): Yes  Patient is suitable for short-term anticoagulation therapy but deemed unable to take long-term OAC: Yes  Patient has an appropriate rationale to seek a non-pharmacological alternative to OACs. Specific factors include: History of bleeding or increased bleeding risk,    CHADSVASC - 5  HASLBED score - 5    4. NASREEN  - cont CPAP    5. Restricive/obstrucive lung disease  - cont tx per PCP/pulm    6. DM2 6.0  --> 6.1 --> 6.3 --> 5.1 --> 4.9  - cont tx per PCP    7. AVB s/p ICD - s/p CRT-P gen change 6/2022  - cont to monitor  - f/u device clinic and EP    8. Anemia sec to GIB  - f/u with GI and heme/onc  - s/p PRBC, is on Eliquis 2.5 BID, DAPT on hold.   - cont iron tabs and IV iron infusion   - f/u colorectal sx - may need hemorrhoidal banding sx    9. CKD4  - cont to monitor   - f/u nephro     Sending to ER now feeling worse now, more lethargic has KING with LE edema, not diuresing well with PO meds, rec IV diuersis and  monitor I/O and renal function and may need PRBC transfusion.     Thank you for allowing me to participate in this patient's care. Please do not hesitate to contact me with any questions or concerns. Consult note has been forwarded to the referral physician.

## 2023-06-08 NOTE — PROGRESS NOTES
Subjective:       Patient ID: Jake Ortiz is a 82 y.o. male.    Chief Complaint: Follow-up      HPI Comments:       Current Outpatient Medications:     acetaminophen (TYLENOL) 500 MG tablet, Take 2 tablets (1,000 mg total) by mouth every 6 (six) hours as needed for Pain., Disp: , Rfl: 0    apixaban (ELIQUIS) 2.5 mg Tab, Take 1 tablet (2.5 mg total) by mouth 2 (two) times daily., Disp: 180 tablet, Rfl: 1    atorvastatin (LIPITOR) 80 MG tablet, Take 1 tablet (80 mg total) by mouth every evening., Disp: 90 tablet, Rfl: 1    blood sugar diagnostic Strp, Check blood glucose levels daily in the AM fasting and 1-2 times more daily., Disp: 300 strip, Rfl: 3    cholecalciferol, vitamin D3, (VITAMIN D3) 25 mcg (1,000 unit) capsule, Take 1,000 Units by mouth once daily., Disp: , Rfl:     ferrous sulfate (FEOSOL) 325 mg (65 mg iron) Tab tablet, Take 1 tablet (325 mg total) by mouth 2 (two) times daily., Disp: 60 tablet, Rfl: 1    fluticasone propionate (FLONASE) 50 mcg/actuation nasal spray, 2 sprays (100 mcg total) by Each Nostril route once daily., Disp: 48 g, Rfl: 5    furosemide (LASIX) 20 MG tablet, Take 1 tablet (20 mg total) by mouth once daily., Disp: 30 tablet, Rfl: 11    krill/om-3/dha/epa/phospho/ast (MEGARED OMEGA-3 KRILL OIL ORAL), Take 1 capsule by mouth every morning., Disp: , Rfl:     L.acidophil,parac-S.therm-Bif. (RISAQUAD) Cap capsule, Take 1 capsule by mouth once daily., Disp: 30 capsule, Rfl: 0    lancets Misc, Check blood glucose levels daily in the AM fasting and 1-2 times more daily., Disp: 300 each, Rfl: 3    levocetirizine (XYZAL) 5 MG tablet, Take 1 tablet (5 mg total) by mouth every evening., Disp: 90 tablet, Rfl: 1    losartan (COZAAR) 50 MG tablet, Take 1 tablet (50 mg total) by mouth once daily., Disp: 90 tablet, Rfl: 3    multivitamin capsule, Take 1 capsule by mouth once daily. Takes once a week, Disp: , Rfl:     pantoprazole (PROTONIX) 40 MG tablet, Take 1 tablet (40 mg total) by mouth once  daily., Disp: 90 tablet, Rfl: 3    potassium chloride SA (K-DUR,KLOR-CON) 20 MEQ tablet, Take 20 mEq by mouth., Disp: , Rfl:     pregabalin (LYRICA) 75 MG capsule, Take 1 capsule QHS x 1 week, then increase to BID (if tolerated)., Disp: 60 capsule, Rfl: 1    pyridoxine, vitamin B6, (B-6) 100 MG Tab, Take 50 mg by mouth once daily., Disp: , Rfl:     vit A/vit C/vit E/zinc/copper (OCUVITE PRESERVISION ORAL), Take 1 capsule by mouth every evening., Disp: , Rfl:     clonazePAM (KLONOPIN) 1 MG tablet, Take 1 tablet (1 mg total) by mouth nightly as needed for Anxiety., Disp: 90 tablet, Rfl: 0  No current facility-administered medications for this visit.    Facility-Administered Medications Ordered in Other Visits:     ondansetron injection 4 mg, 4 mg, Intravenous, Once PRN, Tacho Trivedi MD      Transitional Care Note    Family and/or Caretaker present at visit?  Yes.  Diagnostic tests reviewed/disposition: I have reviewed all completed as well as pending diagnostic tests at the time of discharge.  Disease/illness education:  VAIBHAV, hypokalemia  Home health/community services discussion/referrals: Patient has home health established at unknown   Establishment or re-establishment of referral orders for community resources: No other necessary community resources.   Discussion with other health care providers: No discussion with other health care providers necessary.        Hospitalized in mid May for 3 days with weakness felt due to VAIBHAV from over diuresis.  Subsequently went to a nursing home for physical therapy and has done well.  Home now a few days.  Walking with a walker.  Home health to be coming soon starting physical therapy there.    Back on less Lasix than previously.  Still dealing with hypokalemia.  Swelling has gotten worse.  Sees cardiology today.      Back has been doing well with his stimulator.  No longer having any pain so they want to stop the Lyrica we talked about doing that today.    His weight is up  "18 lb since his last visit here in April    On Eliquis 2.5 mg b.i.d..  Had some blood in his urine which appears to have resolved.  Chronic AFib.  Talk of doing Watchman device but this was interrupted seems    Treated for UTI on 05/31.  Omnicef.  Symptoms improved    Plans at some point for total knee replacement on the left.  Not interested in pursuing at this time    Continues to do well with his anxiety with p.r.n. Klonopin  Review of Systems   Constitutional:  Negative for activity change, appetite change and fever.   HENT:  Negative for sore throat.    Respiratory:  Negative for cough and shortness of breath.    Cardiovascular:  Positive for leg swelling. Negative for chest pain.   Gastrointestinal:  Negative for abdominal pain, diarrhea and nausea.   Genitourinary:  Negative for difficulty urinating.   Musculoskeletal:  Positive for gait problem. Negative for arthralgias and myalgias.   Neurological:  Negative for dizziness and headaches.     Objective:      Vitals:    06/08/23 1054   BP: 139/72   Pulse: 80   Temp: 98.7 °F (37.1 °C)   TempSrc: Tympanic   Weight: 102 kg (224 lb 15.7 oz)   Height: 5' 10" (1.778 m)   PainSc: 0-No pain     Physical Exam  Vitals and nursing note reviewed.   Constitutional:       General: He is not in acute distress.     Appearance: He is well-developed. He is not diaphoretic.   HENT:      Head: Normocephalic.   Neck:      Thyroid: No thyromegaly.   Cardiovascular:      Rate and Rhythm: Normal rate and regular rhythm.      Heart sounds: Normal heart sounds. No murmur heard.  Pulmonary:      Effort: Pulmonary effort is normal.      Breath sounds: Normal breath sounds. No wheezing or rales.   Abdominal:      General: There is no distension.      Palpations: Abdomen is soft.   Musculoskeletal:      Cervical back: Neck supple.      Right lower leg: Edema present.      Left lower leg: Edema present.   Lymphadenopathy:      Cervical: No cervical adenopathy.   Skin:     General: Skin is " warm and dry.   Neurological:      Mental Status: He is alert and oriented to person, place, and time.   Psychiatric:         Mood and Affect: Mood normal.         Behavior: Behavior normal.         Thought Content: Thought content normal.         Judgment: Judgment normal.       Assessment:       1. Age-related physical debility    2. Hypokalemia    3. Type 2 diabetes mellitus without complication, without long-term current use of insulin    4. Anemia associated with stage 4 chronic renal failure    5. Coronary artery disease of native artery of native heart with stable angina pectoris    6. Chronic combined systolic and diastolic congestive heart failure    7. VAIBHAV (acute kidney injury)    8. Lumbar radiculopathy    9. Chronic diarrhea        Plan:   Age-related physical debility  Comments:  Now walking with a walker.  Plans to continue physical therapy at home through home health.  One-month follow-up with me    Hypokalemia  Comments:  On potassium supplements.  Metabolic panel today.  Still having diarrhea  Orders:  -     BASIC METABOLIC PANEL; Future; Expected date: 06/08/2023    Type 2 diabetes mellitus without complication, without long-term current use of insulin  Comments:  A1c today.  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  Follow-up cardiology today    Chronic combined systolic and diastolic congestive heart failure  Comments:  Fluid balance appears to be good.  Significant edema in the legs which are not particularly symptomatic    VAIBHAV (acute kidney injury)  Comments:  Improved with hydration.    Lumbar radiculopathy  Comments:  Good control with spinal stimulator    Chronic  diarrhea  Comments:  Persistent.  Contributing certainly 2 hypokalemia.  GI follow-up scheduled  Orders:  -     Ambulatory referral/consult to Gastroenterology; Future; Expected date: 06/15/2023

## 2023-06-08 NOTE — ED PROVIDER NOTES
SCRIBE #1 NOTE: I, Rhonda Bhatti, am scribing for, and in the presence of, Corwin García MD. I have scribed the entire note.      History      Chief Complaint   Patient presents with    Shortness of Breath     Pt sent here by cardiologist for CHF exacerbation workup. Pt reports swelling in BLE and feels SOB. Pt states his kidneys can't tolerate Lasix       Review of patient's allergies indicates:  No Known Allergies     HPI   HPI    6/8/2023, 6:39 PM   History obtained from the patient and the pt's wife at bedside      History of Present Illness: Jake Ortiz is a 82 y.o. male patient with a PMHx of atrial fibrillation, CHF, CKD, CAD s/p CABG, DM2, HLD, HTN, MI, NASREEN, and prostate cancer who presents to the Emergency Department because he was sent by Dr. Hood (Cardiology) for IV diuresis. Currently, the pt is taking 20 mg of Lasix at home. Pt c/o generalized weakness, SOB upon exertion, and bilateral leg swelling. Pt's wife also reports that the pt has chronic diarrhea. Pt has been having 2 episodes of diarrhea per day. Symptoms are constant and moderate in severity. No mitigating or exacerbating factors reported. Patient denies any fever, chills, cough, CP, numbness, headaches, and all other sxs at this time. No further complaints or concerns at this time.         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    #4 PROSTATE BIOPSY, LEFT APEX: ADENOCARCINOMA, FARHAD GRADE 3 + 3 = 6, IN 1 of 2 CORES 9/8/2014    Anemia     Arthritis     Atrial fibrillation 10/19/2017    Back pain     Congestive heart failure 03/05/2018    Coronary artery disease     DM (diabetes mellitus) 2018     am 06/23/2020    DM (diabetes mellitus) 2016     am 08/17/2021    Hyperlipemia     Hypertension     Myocardial infarction     NASREEN (obstructive sleep apnea) 06/05/2018    Prostate cancer  2015    Tobacco dependence     Type 2 diabetes mellitus        Past Surgical History:  Past Surgical History:   Procedure Laterality Date    COLONOSCOPY N/A 9/22/2020    Procedure: COLONOSCOPY;  Surgeon: Javier Farmer MD;  Location: [facility] ENDO;  Service: Endoscopy;  Laterality: N/A;    CORONARY ARTERY BYPASS GRAFT  1987    EPIDURAL STEROID INJECTION N/A 11/22/2019    Procedure: Lumbar L5/S1 IL XUAN;  Surgeon: Tacho Trivedi MD;  Location: [facility];  Service: Pain Management;  Laterality: N/A;    EPIDURAL STEROID INJECTION N/A 1/6/2020    Procedure: Lumbar L5/S1 IL XUAN;  Surgeon: Tacho Trivedi MD;  Location: [facility];  Service: Pain Management;  Laterality: N/A;    EPIDURAL STEROID INJECTION N/A 2/10/2020    Procedure: Caudal XUAN;  Surgeon: Tacho Trivedi MD;  Location: [facility];  Service: Pain Management;  Laterality: N/A;    ESOPHAGOGASTRODUODENOSCOPY N/A 9/22/2020    Procedure: EGD (ESOPHAGOGASTRODUODENOSCOPY);  Surgeon: Javier Farmer MD;  Location: [facility] ENDO;  Service: Endoscopy;  Laterality: N/A;    INJECTION OF ANESTHETIC AGENT AROUND MEDIAL BRANCH NERVES INNERVATING LUMBAR FACET JOINT Bilateral 10/12/2020    Procedure: Bilateral L3-5 MBB;  Surgeon: Tacho Trivedi MD;  Location: [facility];  Service: Pain Management;  Laterality: Bilateral;    INJECTION OF ANESTHETIC AGENT AROUND MEDIAL BRANCH NERVES INNERVATING LUMBAR FACET JOINT Bilateral 12/21/2020    Procedure: Bilateral L3-5 MBB with steroid;  Surgeon: Tacho Trivedi MD;  [facility];  Service: Pain Management;  Laterality: Bilateral;    INSERTION OF SPINAL NEUROSTIMULATOR N/A 3/23/2023    Procedure: INSERTION, NEUROSTIMULATOR, SPINAL;  Surgeon: Philipp Demarco MD;  Location: [facility] OR;  Service: Pain Management;  Laterality: N/A;    INTRALUMINAL GASTROINTESTINAL TRACT IMAGING VIA CAPSULE N/A 12/29/2021    Procedure: IMAGING PROCEDURE, GI TRACT, INTRALUMINAL, VIA CAPSULE;  Surgeon: Tahir  Juanpablo RN;  Location: [facility] ENDO;  Service: Endoscopy;  Laterality: N/A;    PROSTATE SURGERY      prostate radiation    RADIOFREQUENCY THERMOCOAGULATION Left 6/4/2021    Procedure: Left L3-5 Lumbar RFA;  Surgeon: Tacho Trivedi MD;  Location: [facility] PAIN MGT;  Service: Pain Management;  Laterality: Left;    RADIOFREQUENCY THERMOCOAGULATION Right 6/18/2021    Procedure: Right L3-5 Lumbar RFA;  Surgeon: Tacho Trivedi MD;  Location: [facility] PAIN MGT;  Service: Pain Management;  Laterality: Right;    REPLACEMENT OF PACEMAKER GENERATOR Left 6/16/2022    Procedure: REPLACEMENT, PULSE GENERATOR, CARDIAC PACEMAKER/CRT-P device;  Surgeon: Darrel Carrero MD;  Location: [facility] CATH LAB;  Service: Cardiology;  Laterality: Left;  NOT MRI safe   Pacer and leads implanted 9/30/2017, Dr. Souleymane SANTACRUZ Consulta CRT-P C4TR01, [id number]   A lead: Mdt 5076 CapSureFix® Novus, [id number]   RV lead: Mdt 5076 CapSureFix® Novus, [id number]   LV lead: Jovan 4196 At    SELECTIVE INJECTION OF ANESTHETIC AGENT AROUND LUMBAR SPINAL NERVE ROOT BY TRANSFORAMINAL APPROACH Bilateral 6/8/2022    Procedure: Bilateral L3/4 TF XUAN with RN IV sedation;  Surgeon: Philipp Demarco MD;  Location: [facility] PAIN MGT;  Service: Pain Management;  Laterality: Bilateral;    SPINE SURGERY      fusion    TONSILLECTOMY      TRIAL OF SPINAL CORD NERVE STIMULATOR N/A 1/25/2023    Procedure: Trial, Neurostimulator, Spinal Cord with RN IV sedation;  Surgeon: Philipp Demarco MD;  Location: [facility] PAIN MGT;  Service: Pain Management;  Laterality: N/A;         Family History:  Family History   Problem Relation Age of Onset    Heart attack Mother     Diabetes Mother     Heart disease Mother     Cataracts Mother     Stroke Father     Heart disease Father     Heart disease Brother        Social History:  Social History     Tobacco Use    Smoking status: Former     Packs/day: 1.50     Years: 20.00     Pack years: 30.00     Types: Cigarettes     Start date: 1968     Quit date: 1988      Years since quittin.4    Smokeless tobacco: Never   Substance and Sexual Activity    Alcohol use: No    Drug use: No    Sexual activity: Not Currently       ROS   Review of Systems   Constitutional:  Negative for chills and fever.   HENT:  Negative for sore throat.    Respiratory:  Positive for shortness of breath. Negative for cough.    Cardiovascular:  Positive for leg swelling (bilateral). Negative for chest pain.   Gastrointestinal:  Positive for diarrhea (chronic). Negative for nausea.   Genitourinary:  Negative for dysuria.   Musculoskeletal:  Negative for back pain.   Skin:  Negative for rash.   Neurological:  Positive for weakness (generalized). Negative for numbness and headaches.   Hematological:  Does not bruise/bleed easily.   All other systems reviewed and are negative.    Physical Exam      Initial Vitals [23 1458]   BP Pulse Resp Temp SpO2   (!) 163/69 74 (!) 28 98.4 °F (36.9 °C) 98 %      MAP       --          Physical Exam  Nursing Notes and Vital Signs Reviewed.  Constitutional: Patient is in no apparent distress. Well-developed and well-nourished.  Head: Atraumatic. Normocephalic.  Eyes: PERRL. EOM intact. Conjunctivae are not pale. No scleral icterus.  ENT: Mucous membranes are moist. Oropharynx is clear and symmetric.    Neck: Supple. Full ROM. No lymphadenopathy.  Cardiovascular: Regular rate. Regular rhythm. No murmurs, rubs, or gallops. Distal pulses are 2+ and symmetric.  Pulmonary/Chest: No respiratory distress. Clear to auscultation bilaterally. No wheezing or rales.  Abdominal: Soft and non-distended.  There is no tenderness.  No rebound, guarding, or rigidity.   Musculoskeletal: Moves all extremities. No obvious deformities.   Skin: Warm and dry.  Neurological:  Alert, awake, and appropriate.  Normal speech.  No acute focal neurological deficits are appreciated.  Psychiatric: Normal affect. Good eye contact. Appropriate in content.    ED Course    Procedures  ED Vital  Signs:  Vitals:    06/08/23 1458 06/08/23 1730 06/08/23 1742 06/08/23 1745   BP: (!) 163/69 126/75     Pulse: 74  74 60   Resp: (!) 28   (!) 28   Temp: 98.4 °F (36.9 °C)      TempSrc: Oral      SpO2: 98%   100%   Weight: 101.8 kg (224 lb 6.9 oz)       06/08/23 1800 06/08/23 1840   BP: (!) 142/63    Pulse: 60 61   Resp: (!) 23 (!) 29   Temp:     TempSrc:     SpO2: 98% 99%   Weight:         Abnormal Lab Results:  Labs Reviewed   CBC W/ AUTO DIFFERENTIAL - Abnormal; Notable for the following components:       Result Value    RBC 3.22 (*)     Hemoglobin 9.4 (*)     Hematocrit 31.4 (*)     MCHC 29.9 (*)     RDW 17.9 (*)     Lymph # 0.6 (*)     Gran % 76.7 (*)     Lymph % 8.6 (*)     All other components within normal limits   B-TYPE NATRIURETIC PEPTIDE - Abnormal; Notable for the following components:     (*)     All other components within normal limits   COMPREHENSIVE METABOLIC PANEL - Abnormal; Notable for the following components:    Chloride 116 (*)     CO2 18 (*)     Glucose 123 (*)     Creatinine 1.5 (*)     Albumin 3.4 (*)     eGFR 47 (*)     All other components within normal limits   TROPONIN I - Abnormal; Notable for the following components:    Troponin I 0.032 (*)     All other components within normal limits   URINALYSIS, REFLEX TO URINE CULTURE - Abnormal; Notable for the following components:    Protein, UA 1+ (*)     Occult Blood UA 3+ (*)     All other components within normal limits    Narrative:     Specimen Source->Urine   URINALYSIS MICROSCOPIC - Abnormal; Notable for the following components:    RBC, UA >100 (*)     Hyaline Casts, UA 19 (*)     All other components within normal limits    Narrative:     Specimen Source->Urine        All Lab Results:  Results for orders placed or performed during the hospital encounter of 06/08/23   CBC auto differential   Result Value Ref Range    WBC 7.36 3.90 - 12.70 K/uL    RBC 3.22 (L) 4.60 - 6.20 M/uL    Hemoglobin 9.4 (L) 14.0 - 18.0 g/dL    Hematocrit  31.4 (L) 40.0 - 54.0 %    MCV 98 82 - 98 fL    MCH 29.2 27.0 - 31.0 pg    MCHC 29.9 (L) 32.0 - 36.0 g/dL    RDW 17.9 (H) 11.5 - 14.5 %    Platelets 233 150 - 450 K/uL    MPV 10.0 9.2 - 12.9 fL    Immature Granulocytes 0.3 0.0 - 0.5 %    Gran # (ANC) 5.7 1.8 - 7.7 K/uL    Immature Grans (Abs) 0.02 0.00 - 0.04 K/uL    Lymph # 0.6 (L) 1.0 - 4.8 K/uL    Mono # 0.9 0.3 - 1.0 K/uL    Eos # 0.1 0.0 - 0.5 K/uL    Baso # 0.03 0.00 - 0.20 K/uL    nRBC 0 0 /100 WBC    Gran % 76.7 (H) 38.0 - 73.0 %    Lymph % 8.6 (L) 18.0 - 48.0 %    Mono % 12.1 4.0 - 15.0 %    Eosinophil % 1.9 0.0 - 8.0 %    Basophil % 0.4 0.0 - 1.9 %    Differential Method Automated    Brain natriuretic peptide   Result Value Ref Range     (H) 0 - 99 pg/mL   Comprehensive metabolic panel   Result Value Ref Range    Sodium 144 136 - 145 mmol/L    Potassium 4.4 3.5 - 5.1 mmol/L    Chloride 116 (H) 95 - 110 mmol/L    CO2 18 (L) 23 - 29 mmol/L    Glucose 123 (H) 70 - 110 mg/dL    BUN 19 8 - 23 mg/dL    Creatinine 1.5 (H) 0.5 - 1.4 mg/dL    Calcium 9.1 8.7 - 10.5 mg/dL    Total Protein 7.6 6.0 - 8.4 g/dL    Albumin 3.4 (L) 3.5 - 5.2 g/dL    Total Bilirubin 0.4 0.1 - 1.0 mg/dL    Alkaline Phosphatase 101 55 - 135 U/L    AST 31 10 - 40 U/L    ALT 22 10 - 44 U/L    Anion Gap 10 8 - 16 mmol/L    eGFR 47 (A) >60 mL/min/1.73 m^2   Troponin I   Result Value Ref Range    Troponin I 0.032 (H) 0.000 - 0.026 ng/mL   Urinalysis, Reflex to Urine Culture Urine, Clean Catch    Specimen: Urine   Result Value Ref Range    Specimen UA Urine, Clean Catch     Color, UA Yellow Yellow, Straw, Beth    Appearance, UA Clear Clear    pH, UA 5.0 5.0 - 8.0    Specific Gravity, UA 1.015 1.005 - 1.030    Protein, UA 1+ (A) Negative    Glucose, UA Negative Negative    Ketones, UA Negative Negative    Bilirubin (UA) Negative Negative    Occult Blood UA 3+ (A) Negative    Nitrite, UA Negative Negative    Urobilinogen, UA Negative <2.0 EU/dL    Leukocytes, UA Negative Negative   Urinalysis  Microscopic   Result Value Ref Range    RBC, UA >100 (H) 0 - 4 /hpf    WBC, UA 1 0 - 5 /hpf    Bacteria None None-Occ /hpf    Hyaline Casts, UA 19 (A) 0-1/lpf /lpf    Microscopic Comment SEE COMMENT      *Note: Due to a large number of results and/or encounters for the requested time period, some results have not been displayed. A complete set of results can be found in Results Review.         Imaging Results:  Imaging Results              X-Ray Chest 1 View (Final result)  Result time 06/08/23 16:20:06      Final result by Dodie Richardson MD (06/08/23 16:20:06)                   Impression:      Cardiomegaly with small bilateral pleural effusions.  Increased hazy bibasilar opacity which may correlate with layering pleural fluid versus infiltrate or edema.      Electronically signed by: Dodie Richardson  Date:    06/08/2023  Time:    16:20               Narrative:    EXAMINATION:  XR CHEST 1 VIEW    CLINICAL HISTORY:  Dyspnea, unspecified    TECHNIQUE:  Single frontal view of the chest was performed.    COMPARISON:  Chest radiograph 05/29/2023    FINDINGS:  Cardiac pacer overlies the upper lateral left hemithorax with 3 leads appearing in similar position.  Spinal stimulator overlies the midline, appearing in similar position.  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  Mild haziness at the lung bases noted.  No pneumothorax.    The cardiac silhouette is enlarged, similar to the comparison exam.  There is aortic calcification.    No acute osseous abnormality.                                     The EKG was ordered, reviewed, and independently interpreted by the ED provider.  Interpretation time: 15:00  Rate: 65 BPM  Rhythm: Ventricular-paced rhythm  Interpretation: Biventricular pacemaker detected. No STEMI.             The Emergency Provider reviewed the vital signs and test  results, which are outlined above.    ED Discussion     7:09 PM: Discussed case with Danilo Britton NP ([facility]). Dr. Brown agrees with current care and management of pt and accepts admission.   Admitting Service: Hospital Medicine  Admitting Physician: Dr. Brown   Admit to: [facility] Tele    7:10 PM: Re-evaluated pt. I have discussed test results, shared treatment plan, and the need for admission with patient and family at bedside. Pt and family express understanding at this time and agree with all information. All questions answered. Pt and family have no further questions or concerns at this time. Pt is ready for admit.          ED Medication(s):  Medications   furosemide injection 40 mg (40 mg Intravenous Given 6/8/23 1913)           New Prescriptions    No medications on file         Medical Decision Making    Medical Decision Making:   History:   Old Medical Records: I decided to obtain old medical records.  Old Records Summarized: records from clinic visits.       <> Summary of Records: Cardiology note reviewed today.  Patient is sent to the ED for IV diuresis  Initial Assessment:   Bilateral lower extremity edema.  Sent from Cardiology for IV diuresis.  Patient currently on 20 mg of Lasix daily  Independently Interpreted Test(s):   I have ordered and independently interpreted X-rays - see prior notes.  I have ordered and independently interpreted EKG Reading(s) - see prior notes  Clinical Tests:   Lab Tests: Ordered and Reviewed  Radiological Study: Ordered and Reviewed  Medical Tests: Ordered and Reviewed  ED Management:  Place in observation for IV diuresis for fluid overload with acute CHF exacerbation.  Treated with IV Lasix in the emergency department         Scribe Attestation:   Scribe #1: I performed the above scribed service and the documentation accurately describes the services I performed. I attest to the accuracy of the note.    Attending:   Physician Attestation Statement for Scribe  #1: I, Corwin García MD, personally performed the services described in this documentation, as scribed by Rhonda Bhatti, in my presence, and it is both accurate and complete.          Clinical Impression       ICD-10-CM ICD-9-CM   1. Acute on chronic congestive heart failure, unspecified heart failure type  I50.9 428.0   2. Dyspnea  R06.00 786.09       Disposition:   Disposition: Placed in Observation  Condition: Fair       Corwin García MD  06/08/23 1939

## 2023-06-08 NOTE — FIRST PROVIDER EVALUATION
Medical screening examination initiated.  I have conducted a focused provider triage encounter, findings are as follows:    Brief history of present illness:  Patient complains of edema history of CHF and kidney disease was sent in by Dr. Hood as he is not able to take Lasix    There were no vitals filed for this visit.    Pertinent physical exam:  Patient appears short of breath    Brief workup plan:  Labs imaging    Preliminary workup initiated; this workup will be continued and followed by the physician or advanced practice provider that is assigned to the patient when roomed.

## 2023-06-09 NOTE — SUBJECTIVE & OBJECTIVE
Interval History:  Patient in bed upon exam and reports shortness of breath with mobility and bilateral lower extremity edema.  Wife reports recent consumption of sodium rich foods.  Diuresis in progress.  Cardiology following.    Review of Systems   Constitutional:  Positive for fatigue. Negative for chills, diaphoresis and fever.   HENT:  Negative for congestion, postnasal drip, sinus pressure and trouble swallowing.    Respiratory:  Positive for shortness of breath. Negative for cough and wheezing.    Cardiovascular:  Positive for leg swelling. Negative for chest pain and palpitations.   Gastrointestinal:  Negative for abdominal distention, abdominal pain, diarrhea and vomiting.   Genitourinary:  Negative for difficulty urinating, flank pain, frequency and urgency.   Musculoskeletal:  Positive for arthralgias and gait problem (uses walker).   Skin:  Negative for wound.   Neurological:  Negative for dizziness and weakness.   Psychiatric/Behavioral:  Negative for agitation and confusion. The patient is not nervous/anxious.    All other systems reviewed and are negative.  Objective:     Vital Signs (Most Recent):  Temp: 97.9 °F (36.6 °C) (06/09/23 1146)  Pulse: 70 (06/09/23 1146)  Resp: 19 (06/09/23 1146)  BP: 129/60 (06/09/23 1146)  SpO2: 96 % (06/09/23 1146) Vital Signs (24h Range):  Temp:  [97.9 °F (36.6 °C)-100.2 °F (37.9 °C)] 97.9 °F (36.6 °C)  Pulse:  [60-74] 70  Resp:  [18-29] 19  SpO2:  [96 %-100 %] 96 %  BP: (126-168)/(60-75) 129/60     Weight: 101.8 kg (224 lb 6.9 oz)  Body mass index is 32.2 kg/m².    Intake/Output Summary (Last 24 hours) at 6/9/2023 1505  Last data filed at 6/9/2023 1345  Gross per 24 hour   Intake 360 ml   Output 1 ml   Net 359 ml         Physical Exam  Vitals and nursing note reviewed.   Constitutional:       General: He is awake. He is not in acute distress.     Appearance: Normal appearance. He is well-developed and well-groomed. He is not ill-appearing, toxic-appearing or  diaphoretic.      Comments:   Elderly male resting comfortably in stretcher in no acute distress   HENT:      Head: Normocephalic and atraumatic.   Eyes:      Extraocular Movements: Extraocular movements intact.      Conjunctiva/sclera: Conjunctivae normal.   Cardiovascular:      Rate and Rhythm: Normal rate and regular rhythm.      Pulses: Normal pulses.           Radial pulses are 2+ on the right side and 2+ on the left side.        Dorsalis pedis pulses are 2+ on the right side and 2+ on the left side.      Heart sounds: Normal heart sounds. No murmur heard.  Pulmonary:      Effort: Pulmonary effort is normal.      Breath sounds: Decreased breath sounds and rales (scant rales in bilat lower lobes) present. No wheezing or rhonchi.   Abdominal:      General: Bowel sounds are normal.      Palpations: Abdomen is soft.      Tenderness: There is no abdominal tenderness.   Genitourinary:     Comments: Deferred   Musculoskeletal:      Cervical back: Normal range of motion and neck supple.      Right lower le+ Edema present.      Left lower le+ Edema present.      Comments: 5/5 strength throughout   Skin:     General: Skin is warm and dry.      Capillary Refill: Capillary refill takes less than 2 seconds.   Neurological:      General: No focal deficit present.      Mental Status: He is alert and oriented to person, place, and time. Mental status is at baseline.      GCS: GCS eye subscore is 4. GCS verbal subscore is 5. GCS motor subscore is 6.      Cranial Nerves: Cranial nerves 2-12 are intact.      Sensory: Sensation is intact.      Motor: Motor function is intact.   Psychiatric:         Mood and Affect: Mood normal.         Behavior: Behavior normal. Behavior is cooperative.           Significant Labs: All pertinent labs within the past 24 hours have been reviewed.  CBC:   Recent Labs   Lab 23  1130 23  1551 23  0542   WBC 7.45 7.36 5.03   HGB 9.1* 9.4* 8.3*   HCT 31.5* 31.4* 28.1*     233 202     CMP:   Recent Labs   Lab 06/08/23  1130 06/08/23  1551 06/09/23  0541    144 143   K 4.9 4.4 4.4   * 116* 116*   CO2 17* 18* 17*    123* 113*   BUN 18 19 18   CREATININE 1.5* 1.5* 1.4   CALCIUM 9.2 9.1 8.8   PROT  --  7.6  --    ALBUMIN  --  3.4*  --    BILITOT  --  0.4  --    ALKPHOS  --  101  --    AST  --  31  --    ALT  --  22  --    ANIONGAP 10 10 10     Troponin:   Recent Labs   Lab 06/08/23  1551 06/08/23 2024   TROPONINI 0.032* 0.040*       Significant Imaging: I have reviewed all pertinent imaging results/findings within the past 24 hours.

## 2023-06-09 NOTE — CHAPLAIN
Initial visit with patient.  Visited with patient to assess for spiritual and emotional needs.  Patient was doing very well at the time of my visit.  Patient spent a lot of time sharing his story and different memories from within his life.  Patient currently had no other needs and spiritual care remains available as needed.    Chaplain Kosta Vanegas M.Div., [facility]

## 2023-06-09 NOTE — ASSESSMENT & PLAN NOTE
Patient with Paroxysmal (<7 days) atrial fibrillation which is controlled currently with Pacemaker. Patient is currently in  Ventricular paced rhythm.[id number] Score: 4. HASBLED Score: . Anticoagulation indicated. Anticoagulation done with Eliquis.

## 2023-06-09 NOTE — ASSESSMENT & PLAN NOTE
Awaiting approval for CPAP machine.  Not currently wearing any supplemental oxygen during the day or while sleeping at night.  In no acute respiratory distress.  Satting 100% on room air.    Plan:  - CPAP q.h.s. p.r.n.

## 2023-06-09 NOTE — ASSESSMENT & PLAN NOTE
Troponin elevated to 0.032.  Elevation likely secondary to ischemic demand from acute CHF exacerbation.   Plan:  - tele monitoring   -trend troponin- elevated anf flat  - repeat EKG p.r.n.   - nitro p.r.n.   - analgesics p.r.n.   - cards consulted   -diurese in progress

## 2023-06-09 NOTE — ASSESSMENT & PLAN NOTE
Patient is identified as having Combined Systolic and Diastolic heart failure that is Acute on chronic. CHF is currently uncontrolled due to Continued edema of extremities, >3 pillow orthopnea, Rales/crackles on pulmonary exam and Pulmonary edema/pleural effusion on CXR. Latest ECHO performed and demonstrates- Results for orders placed during the hospital encounter of 05/10/23    Echo    Interpretation Summary  · Eccentric hypertrophy and low normal systolic function.  · Moderate left atrial enlargement.  · The estimated ejection fraction is 50%.  · Indeterminate left ventricular diastolic function.  · There is abnormal septal wall motion consistent with left bundle branch block.  · Normal right ventricular size with normal right ventricular systolic function.  · Mild-to-moderate mitral regurgitation.  · Mild to moderate tricuspid regurgitation.  . Continue ACE/ARB and Furosemide and monitor clinical status closely. Monitor on telemetry. Patient is off CHF pathway.  Monitor strict Is&Os and daily weights.  Place on fluid restriction of 1.5 L. Continue to stress to patient importance of self efficacy and  on diet for CHF. Last BNP reviewed- and noted below   Recent Labs   Lab 06/08/23  1551   *     Reports little diuresing from home Lasix dosing  20 mg q.d. received 40 mg Lasix IV in ED. Monitor overnight for urinary output.  May benefit in changing diuretic to Bumex to achieve desired diuretic effect.

## 2023-06-09 NOTE — ASSESSMENT & PLAN NOTE
Patient with history of  Chronic restrictive lung disease not currently on inhalers or home oxygen not presently in acute exacerbation and is without signs/symptoms of distress.   Patient currently saturating 100 % on room air.  Plan:  -Continue home medications, titrate as needed  -Titrate oxygen requirements as needed  -Incentive spirometry   -Monitor pulse oximetry  -Joan prn

## 2023-06-09 NOTE — ASSESSMENT & PLAN NOTE
Patient with known CAD s/p CABG, which is controlled Will continue Eliquis and Statin and monitor for S/Sx of angina/ACS. Continue to monitor on telemetry.

## 2023-06-09 NOTE — PLAN OF CARE
[facility] - Med Surg  Discharge Assessment    Primary Care Provider: Byron Stevens MD     Discharge Assessment (most recent)       BRIEF DISCHARGE ASSESSMENT - 06/09/23 1036          Discharge Planning    Assessment Type Discharge Planning Brief Assessment     Resource/Environmental Concerns none     Support Systems Spouse/significant other     Equipment Currently Used at Home walker, rolling;bath bench     Current Living Arrangements home     Patient/Family Anticipates Transition to home with help/services     Patient/Family Anticipated Services at Transition home health care     DME Needed Upon Discharge  none     Discharge Plan A Home Health     Discharge Plan B Home with family                   Patient reports discharge from SNF on Wed with admission to hospital Thursday.  Home health arranged prior to dc from SNF, family believes with [facility].  Patient was brought to hospital before he could be admitted to .

## 2023-06-09 NOTE — ASSESSMENT & PLAN NOTE
Appears near baseline without evidence of active bleeding noted.   Recent Labs   Lab 06/08/23  1130 06/08/23  1551 06/09/23  0542   HGB 9.1* 9.4* 8.3*   HCT 31.5* 31.4* 28.1*   * 98 97   MCHC 28.9* 29.9* 29.5*   RDW 18.1* 17.9* 17.8*    233 202     Plan:  -Monitor H/H and plts  -Type and screen, transfuse as needed   -Continue home medications   -Hold antiplatelets/anticoagulants in setting of active bleeding/pending surgical intervention

## 2023-06-09 NOTE — ASSESSMENT & PLAN NOTE
Currently normotensive. BP usually well controlled per patient with home medications.  Plan:  -Optimize pain control   -Continue home medications ( Lasix and losartan), titrate as needed   -Monitor BP  -Low salt/cardiac diet  -IV hydralazine prn for SBP>160 or DBP>90

## 2023-06-09 NOTE — HPI
History of Present Illness: Jake Ortiz is a 82 y.o. male patient with a PMHx of atrial fibrillation, CHF, CKD, CAD s/p CABG, DM2, HLD, HTN, MI, NASREEN, and prostate cancer who presents to the Emergency Department because he was sent by Dr. Hood (Cardiology) for IV diuresis. Currently, the pt is taking 20 mg of Lasix at home. Pt c/o generalized weakness, SOB upon exertion, and bilateral leg swelling. Pt's wife also reports that the pt has chronic diarrhea. Pt has been having 2 episodes of diarrhea per day. Symptoms are constant and moderate in severity. No mitigating or exacerbating factors reported. Patient denies any fever, chills, cough, CP, numbness, headaches, and all other sxs at this time. No further complaints or concerns at this time.   Patient seen and examined and improved CHF and edema. Plan continued diuresis for next 24 hours.

## 2023-06-09 NOTE — ED NOTES
Patient taken via stretcher to room 555. Patient wife with patient and all belongings sent with patient.

## 2023-06-09 NOTE — HOSPITAL COURSE
Patient admitted to observation for acute on chronic combined systolic and diastolic congestive heart failure.  Imaging supports Cardiomegaly with small bilateral pleural effusions with increased hazy bibasilar opacity which may correlate with layering pleural fluid versus infiltrate or edema.  IV Lasix initiated.  Previous echo results from 05/11/2023 reviewed.  Cardiology consulted.  Troponin elevated and flat secondary to volume strain. Of note, patient Rupert recently discharged from [facility].  Current plan of care continued as previously prescribed. On 6/10/23, edema to BLE improved with pitting edema to ankles/feet and communicative dyspnea noted upon assessment. Case discussed with Cardiology and Lasix dose increased. Creatinine and H/H stable. On 6/11/23, diuresis continued. Pt encouraged to remain compliant with fluid restriction.   6/12: diuretics being adjusted by cards. LE edema improving.   6/13: pt discharged home on po torsemide 20mg daily.

## 2023-06-09 NOTE — ASSESSMENT & PLAN NOTE
Appears near baseline without evidence of active bleeding noted.   Recent Labs   Lab 06/08/23  1551   HGB 9.4*   HCT 31.4*   MCV 98   MCHC 29.9*   RDW 17.9*        Plan:  -Monitor H/H and plts  -Type and screen, transfuse as needed   -Continue home medications   -Hold antiplatelets/anticoagulants in setting of active bleeding/pending surgical intervention

## 2023-06-09 NOTE — SUBJECTIVE & OBJECTIVE
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    #4 PROSTATE BIOPSY, LEFT APEX: ADENOCARCINOMA, FARHAD GRADE 3 + 3 = 6, IN 1 of 2 CORES 9/8/2014    Anemia     Arthritis     Atrial fibrillation 10/19/2017    Back pain     Congestive heart failure 03/05/2018    Coronary artery disease     DM (diabetes mellitus) 2018     am 06/23/2020    DM (diabetes mellitus) 2016     am 08/17/2021    Hyperlipemia     Hypertension     Myocardial infarction     NASREEN (obstructive sleep apnea) 06/05/2018    Prostate cancer 2015    Tobacco dependence     Type 2 diabetes mellitus        Past Surgical History:   Procedure Laterality Date    COLONOSCOPY N/A 9/22/2020    Procedure: COLONOSCOPY;  Surgeon: Javier Farmer MD;  Location: [facility] ENDO;  Service: Endoscopy;  Laterality: N/A;    CORONARY ARTERY BYPASS GRAFT  1987    EPIDURAL STEROID INJECTION N/A 11/22/2019    Procedure: Lumbar L5/S1 IL XUAN;  Surgeon: Tacho Trivedi MD;  Location: [facility]IN [facility]GT;  Service: Pain Management;  Laterality: N/A;    EPIDURAL STEROID INJECTION N/A 1/6/2020    Procedure: Lumbar L5/S1 IL XUAN;  Surgeon: Tacho Trivedi MD;  Location: [facility]GT;  Service: Pain Management;  Laterality: N/A;    EPIDURAL STEROID INJECTION N/A 2/10/2020    Procedure: Caudal XUAN;  Surgeon: Tacho Trivedi MD;  Location: [facility]GT;  Service: Pain Management;  Laterality: N/A;    ESOPHAGOGASTRODUODENOSCOPY N/A 9/22/2020    Procedure: EGD (ESOPHAGOGASTRODUODENOSCOPY);  Surgeon: Javier Farmer MD;  Location: [facility] ENDO;  Service: Endoscopy;  Laterality: N/A;    INJECTION OF ANESTHETIC AGENT AROUND MEDIAL BRANCH NERVES INNERVATING LUMBAR FACET JOINT Bilateral 10/12/2020    Procedure: Bilateral L3-5 MBB;  Surgeon: Tacho Trivedi MD;  Location: [facility];  Service: Pain Management;  Laterality: Bilateral;    INJECTION OF ANESTHETIC AGENT AROUND MEDIAL BRANCH NERVES INNERVATING LUMBAR  FACET JOINT Bilateral 12/21/2020    Procedure: Bilateral L3-5 MBB with steroid;  Surgeon: Tacho Trivedi MD;  Location: [facility] PAIN MGT;  Service: Pain Management;  Laterality: Bilateral;    INSERTION OF SPINAL NEUROSTIMULATOR N/A 3/23/2023    Procedure: INSERTION, NEUROSTIMULATOR, SPINAL;  Surgeon: Philipp Demarco MD;  Location: [facility] OR;  Service: Pain Management;  Laterality: N/A;    INTRALUMINAL GASTROINTESTINAL TRACT IMAGING VIA CAPSULE N/A 12/29/2021    Procedure: IMAGING PROCEDURE, GI TRACT, INTRALUMINAL, VIA CAPSULE;  Surgeon: Tahir Geronimo RN;  Location: [facility] ENDO;  Service: Endoscopy;  Laterality: N/A;    PROSTATE SURGERY      prostate radiation    RADIOFREQUENCY THERMOCOAGULATION Left 6/4/2021    Procedure: Left L3-5 Lumbar RFA;  Surgeon: Tacho Trivedi MD;  Location: [facility] PAIN MGT;  Service: Pain Management;  Laterality: Left;    RADIOFREQUENCY THERMOCOAGULATION Right 6/18/2021    Procedure: Right L3-5 Lumbar RFA;  Surgeon: Tacho Trivedi MD;  Location: [facility] PAIN MGT;  Service: Pain Management;  Laterality: Right;    REPLACEMENT OF PACEMAKER GENERATOR Left 6/16/2022    Procedure: REPLACEMENT, PULSE GENERATOR, CARDIAC PACEMAKER/CRT-P device;  Surgeon: Darrel Carrero MD;  Location: [facility] CATH LAB;  Service: Cardiology;  Laterality: Left;  NOT MRI safe   Pacer and leads implanted 9/30/2017, Dr. Souleymane SANTACRUZ Consulta CRT-P C4TR01, [id number]   A lead: Jovan 5076 CapSureFix® Novus, [id number]   RV lead: Jovan 5076 CapSureFix® Novus, [id number]   LV lead: Jovan [id number] At    SELECTIVE INJECTION OF ANESTHETIC AGENT AROUND LUMBAR SPINAL NERVE ROOT BY TRANSFORAMINAL APPROACH Bilateral 6/8/2022    Procedure: Bilateral L3/4 TF XUAN with RN IV sedation;  Surgeon: Philipp Demarco MD;  Location: [facility] PAIN MGT;  Service: Pain Management;  Laterality: Bilateral;    SPINE SURGERY      fusion    TONSILLECTOMY      TRIAL OF SPINAL CORD NERVE STIMULATOR N/A 1/25/2023    Procedure: Trial, Neurostimulator, Spinal Cord with RN  IV sedation;  Surgeon: Philipp Demarco MD;  [facility];  Service: Pain Management;  Laterality: N/A;       Review of patient's allergies indicates:  No Known Allergies    Current Facility-Administered Medications on File Prior to Encounter   Medication    ondansetron injection 4 mg     Current Outpatient Medications on File Prior to Encounter   Medication Sig    acetaminophen (TYLENOL) 500 MG tablet Take 2 tablets (1,000 mg total) by mouth every 6 (six) hours as needed for Pain.    apixaban (ELIQUIS) 2.5 mg Tab Take 1 tablet (2.5 mg total) by mouth 2 (two) times daily.    atorvastatin (LIPITOR) 80 MG tablet Take 1 tablet (80 mg total) by mouth every evening.    cholecalciferol, vitamin D3, (VITAMIN D3) 25 mcg (1,000 unit) capsule Take 1,000 Units by mouth once daily.    clonazePAM (KLONOPIN) 1 MG tablet Take 1 tablet (1 mg total) by mouth nightly as needed for Anxiety.    ferrous sulfate (FEOSOL) 325 mg (65 mg iron) Tab tablet Take 1 tablet (325 mg total) by mouth 2 (two) times daily.    fluticasone propionate (FLONASE) 50 mcg/actuation nasal spray 2 sprays (100 mcg total) by Each Nostril route once daily.    furosemide (LASIX) 20 MG tablet Take 1 tablet (20 mg total) by mouth once daily.    krill/om-3/dha/epa/phospho/ast (MEGARED OMEGA-3 KRILL OIL ORAL) Take 1 capsule by mouth every morning.    L.acidophil,parac-S.therm-Bif. (RISAQUAD) Cap capsule Take 1 capsule by mouth once daily.    levocetirizine (XYZAL) 5 MG tablet Take 1 tablet (5 mg total) by mouth every evening.    losartan (COZAAR) 50 MG tablet Take 1 tablet (50 mg total) by mouth once daily.    multivitamin capsule Take 1 capsule by mouth once daily. Takes once a week    pantoprazole (PROTONIX) 40 MG tablet Take 1 tablet (40 mg total) by mouth once daily.    potassium chloride SA (K-DUR,KLOR-CON) 20 MEQ tablet Take 20 mEq by mouth once daily.    pregabalin (LYRICA) 75 MG capsule Take 1 capsule QHS x 1 week, then increase to BID (if tolerated).     pyridoxine, vitamin B6, (B-6) 100 MG Tab Take 50 mg by mouth once daily.    vit A/vit C/vit E/zinc/copper (OCUVITE PRESERVISION ORAL) Take 1 capsule by mouth every evening.    blood sugar diagnostic Strp Check blood glucose levels daily in the AM fasting and 1-2 times more daily.    lancets Misc Check blood glucose levels daily in the AM fasting and 1-2 times more daily.     Family History       Problem Relation (Age of Onset)    Cataracts Mother    Diabetes Mother    Heart attack Mother    Heart disease Mother, Father, Brother    Stroke Father          Tobacco Use    Smoking status: Former     Packs/day: 1.50     Years: 20.00     Pack years: 30.00     Types: Cigarettes     Start date:      Quit date:      Years since quittin.4    Smokeless tobacco: Never   Substance and Sexual Activity    Alcohol use: No    Drug use: No    Sexual activity: Not Currently     Review of Systems   Constitutional:  Positive for fatigue. Negative for chills, diaphoresis and fever.   Respiratory:  Positive for shortness of breath. Negative for cough and wheezing.    Cardiovascular:  Positive for leg swelling. Negative for chest pain and palpitations.   Musculoskeletal:  Positive for arthralgias and gait problem (uses walker).   All other systems reviewed and are negative.  Objective:     Vital Signs (Most Recent):  Temp: 98.4 °F (36.9 °C) (23 1458)  Pulse: 61 (23 1840)  Resp: (!) 29 (23)  BP: (!) 142/63 (23 1800)  SpO2: 99 % (23 184) Vital Signs (24h Range):  Temp:  [98.4 °F (36.9 °C)-98.7 °F (37.1 °C)] 98.4 °F (36.9 °C)  Pulse:  [60-89] 61  Resp:  [23-29] 29  SpO2:  [97 %-100 %] 99 %  BP: (126-163)/(63-75) 142/63     Weight: 101.8 kg (224 lb 6.9 oz)  Body mass index is 32.2 kg/m².     Physical Exam  Vitals and nursing note reviewed.   Constitutional:       General: He is awake. He is not in acute distress.     Appearance: Normal appearance. He is well-developed and well-groomed. He is  not ill-appearing, toxic-appearing or diaphoretic.      Comments:   Elderly male resting comfortably in stretcher in no acute distress   HENT:      Head: Normocephalic and atraumatic.   Eyes:      Extraocular Movements: Extraocular movements intact.      Conjunctiva/sclera: Conjunctivae normal.   Cardiovascular:      Rate and Rhythm: Normal rate and regular rhythm.      Pulses: Normal pulses.           Radial pulses are 2+ on the right side and 2+ on the left side.        Dorsalis pedis pulses are 2+ on the right side and 2+ on the left side.      Heart sounds: Normal heart sounds. No murmur heard.  Pulmonary:      Effort: Pulmonary effort is normal.      Breath sounds: Decreased breath sounds and rales (scant rales in bilat lower lobes) present. No wheezing or rhonchi.   Abdominal:      General: Bowel sounds are normal.      Palpations: Abdomen is soft.      Tenderness: There is no abdominal tenderness.   Musculoskeletal:      Cervical back: Normal range of motion and neck supple.      Right lower le+ Edema present.      Left lower le+ Edema present.      Comments: 5/5 strength throughout   Skin:     General: Skin is warm and dry.      Capillary Refill: Capillary refill takes less than 2 seconds.   Neurological:      General: No focal deficit present.      Mental Status: He is alert and oriented to person, place, and time. Mental status is at baseline.      GCS: GCS eye subscore is 4. GCS verbal subscore is 5. GCS motor subscore is 6.      Cranial Nerves: Cranial nerves 2-12 are intact.      Sensory: Sensation is intact.      Motor: Motor function is intact.   Psychiatric:         Mood and Affect: Mood normal.         Behavior: Behavior normal. Behavior is cooperative.            LABS:  Recent Results (from the past 24 hour(s))   Urinalysis, Reflex to Urine Culture Urine, Clean Catch    Collection Time: 23  3:42 PM    Specimen: Urine   Result Value Ref Range    Specimen UA Urine, Clean Catch      Color, UA Yellow Yellow, Straw, Beth    Appearance, UA Clear Clear    pH, UA 5.0 5.0 - 8.0    Specific Gravity, UA 1.015 1.005 - 1.030    Protein, UA 1+ (A) Negative    Glucose, UA Negative Negative    Ketones, UA Negative Negative    Bilirubin (UA) Negative Negative    Occult Blood UA 3+ (A) Negative    Nitrite, UA Negative Negative    Urobilinogen, UA Negative <2.0 EU/dL    Leukocytes, UA Negative Negative   Urinalysis Microscopic    Collection Time: 06/08/23  3:42 PM   Result Value Ref Range    RBC, UA >100 (H) 0 - 4 /hpf    WBC, UA 1 0 - 5 /hpf    Bacteria None None-Occ /hpf    Hyaline Casts, UA 19 (A) 0-1/lpf /lpf    Microscopic Comment SEE COMMENT    CBC auto differential    Collection Time: 06/08/23  3:51 PM   Result Value Ref Range    WBC 7.36 3.90 - 12.70 K/uL    RBC 3.22 (L) 4.60 - 6.20 M/uL    Hemoglobin 9.4 (L) 14.0 - 18.0 g/dL    Hematocrit 31.4 (L) 40.0 - 54.0 %    MCV 98 82 - 98 fL    MCH 29.2 27.0 - 31.0 pg    MCHC 29.9 (L) 32.0 - 36.0 g/dL    RDW 17.9 (H) 11.5 - 14.5 %    Platelets 233 150 - 450 K/uL    MPV 10.0 9.2 - 12.9 fL    Immature Granulocytes 0.3 0.0 - 0.5 %    Gran # (ANC) 5.7 1.8 - 7.7 K/uL    Immature Grans (Abs) 0.02 0.00 - 0.04 K/uL    Lymph # 0.6 (L) 1.0 - 4.8 K/uL    Mono # 0.9 0.3 - 1.0 K/uL    Eos # 0.1 0.0 - 0.5 K/uL    Baso # 0.03 0.00 - 0.20 K/uL    nRBC 0 0 /100 WBC    Gran % 76.7 (H) 38.0 - 73.0 %    Lymph % 8.6 (L) 18.0 - 48.0 %    Mono % 12.1 4.0 - 15.0 %    Eosinophil % 1.9 0.0 - 8.0 %    Basophil % 0.4 0.0 - 1.9 %    Differential Method Automated    Brain natriuretic peptide    Collection Time: 06/08/23  3:51 PM   Result Value Ref Range     (H) 0 - 99 pg/mL   Comprehensive metabolic panel    Collection Time: 06/08/23  3:51 PM   Result Value Ref Range    Sodium 144 136 - 145 mmol/L    Potassium 4.4 3.5 - 5.1 mmol/L    Chloride 116 (H) 95 - 110 mmol/L    CO2 18 (L) 23 - 29 mmol/L    Glucose 123 (H) 70 - 110 mg/dL    BUN 19 8 - 23 mg/dL    Creatinine 1.5 (H) 0.5 - 1.4  mg/dL    Calcium 9.1 8.7 - 10.5 mg/dL    Total Protein 7.6 6.0 - 8.4 g/dL    Albumin 3.4 (L) 3.5 - 5.2 g/dL    Total Bilirubin 0.4 0.1 - 1.0 mg/dL    Alkaline Phosphatase 101 55 - 135 U/L    AST 31 10 - 40 U/L    ALT 22 10 - 44 U/L    Anion Gap 10 8 - 16 mmol/L    eGFR 47 (A) >60 mL/min/1.73 m^2   Troponin I    Collection Time: 06/08/23  3:51 PM   Result Value Ref Range    Troponin I 0.032 (H) 0.000 - 0.026 ng/mL   Troponin I    Collection Time: 06/08/23  8:24 PM   Result Value Ref Range    Troponin I 0.040 (H) 0.000 - 0.026 ng/mL       RADIOLOGY  X-Ray Chest 1 View    Result Date: 6/8/2023  EXAMINATION: XR CHEST 1 VIEW CLINICAL HISTORY: Dyspnea, unspecified TECHNIQUE: Single frontal view of the chest was performed. COMPARISON: Chest radiograph 05/29/2023 FINDINGS: Cardiac pacer overlies the upper lateral left hemithorax with 3 leads appearing in similar position.  Spinal stimulator overlies the midline, appearing in similar position.  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  Mild haziness at the lung bases noted.  No pneumothorax. The cardiac silhouette is enlarged, similar to the comparison exam.  There is aortic calcification. No acute osseous abnormality.     Cardiomegaly with small bilateral pleural effusions.  Increased hazy bibasilar opacity which may correlate with layering pleural fluid versus infiltrate or edema. Electronically signed by: Dodie Richardson Date:    06/08/2023 Time:    16:20    X-Ray Chest 1 View    Result Date: 5/14/2023  EXAMINATION: XR CHEST AP PORTABLE CLINICAL HISTORY: r/o pulmonary edema; COMPARISON: Prior radiograph from 05/10/2023. FINDINGS: Prior median sternotomy with a left-sided AICD/pacer device in place.  Low lung volumes.  Small pleural fluid on the right.  Pulmonary vascular congestion.  This has increased in the interval.  No  pneumothorax.  The cardiopericardial silhouette remains enlarged with calcification of the aorta.  No acute osseous findings.     Findings of mild pulmonary edema with a small right-sided pleural effusion. Electronically signed by: Carlos Flowers Jr., MD Date:    05/14/2023 Time:    12:27    X-Ray Knee 3 View Left    Result Date: 5/10/2023  EXAMINATION: XR KNEE 3 VIEW LEFT CLINICAL HISTORY: Pain in left knee TECHNIQUE: AP, lateral, and Merchant views of the left knee were performed. COMPARISON: None FINDINGS: No acute fracture or dislocation.  Postsurgical changes around the medial aspect of the knee atherosclerotic changes.  No acute fracture or dislocation.  Minimal chondrocalcinosis.  Suspect minimal degenerative joint disease.     No acute fracture or dislocation. Electronically signed by: Jack Suresh Date:    05/10/2023 Time:    18:05    CT Head Without Contrast    Result Date: 5/10/2023  EXAMINATION: CT HEAD WITHOUT CONTRAST CLINICAL HISTORY: Head trauma, minor (Age >= 65y); TECHNIQUE: Low dose axial CT images obtained throughout the head without intravenous contrast. Sagittal and coronal reconstructions were performed. COMPARISON: None. FINDINGS: Atrophy and chronic white matter changes Ventricles and sulci are normal in size for age without evidence of hydrocephalus. No extra-axial blood or fluid collections. No parenchymal mass, hemorrhage, edema or major vascular distribution infarct. Skull/extracranial contents (limited evaluation): No fracture. Mastoid air cells and paranasal sinuses are essentially clear.     No acute abnormality. Atrophy and chronic white matter changes All CT scans   are performed using dose optimization techniques including the following: automated exposure control; adjustment of the mA and/or kV; use of iterative reconstruction technique.  Dose modulation was employed for ALARA by means of: Automated exposure control; adjustment of the mA and/or kV according to patient size (this  includes techniques or standardized protocols for targeted exams where dose is matched to indication/reason for exam; i.e. extremities or head); and/or use of iterative reconstructive technique. Electronically signed by: Jack Suresh Date:    05/10/2023 Time:    18:25    CT Lumbar Spine Without Contrast    Result Date: 5/10/2023  EXAMINATION: CT LUMBAR SPINE WITHOUT CONTRAST CLINICAL HISTORY: Low back pain, trauma; TECHNIQUE: CT lumbar spine without COMPARISON: None FINDINGS: No vertebral body compression deformity.  Spinal stimulator device noted.  Moderate severe multilevel degenerative disc disease.  Fusion of L3 and L2 with suggestion of prior posterior fixation.  Laminectomy at L4-L5.  Atherosclerotic changes of the aorta iliac vasculature.  No acute fracture or dislocation.  Decreased bone mineral density.  Mild posterior subcutaneous fat stranding along the postoperative bed.     No acute fracture or dislocation Extensive degenerative joint disease Atherosclerotic disease Spinal stimulator device.  Correlate clinically Prior postsurgical changes including laminectomy at L4-L5 and prior posterior fixation at L2-L3.  Correlate to surgical history. All CT scans at this facility are performed  using dose modulation techniques as appropriate to performed exam including the following:  automated exposure control; adjustment of mA and/or kV according to the patients size (this includes techniques or standardized protocols for targeted exams where dose is matched to indication/reason for exam: i.e. extremities or head);  iterative reconstruction technique. Electronically signed by: Jack Suresh Date:    05/10/2023 Time:    22:47    X-Ray Chest AP Portable    Result Date: 5/29/2023  EXAMINATION: XR CHEST AP PORTABLE CLINICAL HISTORY: weakness; COMPARISON: May 14, 2023, as well as studies dating back to May 11, 2021 FINDINGS: The study is lordotic in position.  Stable scarring or chronic atelectasis in the lung  bases.  The lungs are free of new pulmonary opacities.  The cardiac silhouette size is enlarged.  The trachea is midline and the mediastinal width is normal. Negative for focal infiltrate, effusion or pneumothorax. Pulmonary vasculature is normal. Negative for osseous abnormalities. Three lead left subclavian pacemaker again seen.  Stable lower thoracic spine stimulator, tortuous aorta, aortic arch calcifications, median sternotomy wires, CABG changes, coronary artery stents and cardiophrenic fat pads.     1.  Negative for acute process involving the chest. 2.  Stable findings as noted above. Electronically signed by: José Antonio Jason MD Date:    05/29/2023 Time:    14:28    X-Ray Chest AP Portable    Result Date: 5/10/2023  EXAMINATION: XR CHEST AP PORTABLE CLINICAL HISTORY: Hypotension, unspecified TECHNIQUE: Single frontal view of the chest was performed. COMPARISON: None FINDINGS: Left chest biventricular pacing.  Improved perihilar bronchovascular crowding compared to prior exam. Cardiomegaly the hilar and mediastinal contours are unremarkable. Bones are intact..  Senescent changes.  Mild basilar atelectasis.     As above Electronically signed by: Jack Suresh Date:    05/10/2023 Time:    18:02    Echo    Result Date: 5/11/2023  · Eccentric hypertrophy and low normal systolic function. · Moderate left atrial enlargement. · The estimated ejection fraction is 50%. · Indeterminate left ventricular diastolic function. · There is abnormal septal wall motion consistent with left bundle branch block. · Normal right ventricular size with normal right ventricular systolic function. · Mild-to-moderate mitral regurgitation. · Mild to moderate tricuspid regurgitation.      CT Knee Without Contrast Left    Result Date: 5/12/2023  EXAMINATION: CT KNEE WITHOUT CONTRAST LEFT CLINICAL HISTORY: Left knee pain, stress fracture suspected, neg xray; FINDINGS: There is generalized osteopenia.  No fracture or other acute osseous  abnormality identified.  No osteochondral defects or erosive change.  Normal joint alignment.  The joint spaces appear well preserved.  Chondrocalcinosis is noted with calcium deposition of the cruciate ligaments and menisci as well.  There is a small to moderate joint effusion.  No Baker's cyst or other periarticular fluid collections identified.  The regional musculature and tendons are within normal limits.  Extensive arterial calcifications are noted.  Surgical clips in the popliteal fossa and calf are noted.     No evidence of fracture or other acute osseous abnormality.  Small to moderate joint effusion.  Chondrocalcinosis. All CT scans at this facility are performed  using dose modulation techniques as appropriate to performed exam including the following:  automated exposure control; adjustment of mA and/or kV according to the patients size (this includes techniques or standardized protocols for targeted exams where dose is matched to indication/reason for exam: i.e. extremities or head);  iterative reconstruction technique. Electronically signed by: Tahir Borja MD Date:    05/12/2023 Time:    14:16    CT Hip Without Contrast Left    Result Date: 5/10/2023  EXAMINATION: CT HIP WITHOUT CONTRAST LEFT CLINICAL HISTORY: Hip trauma, fracture suspected, no prior imaging; TECHNIQUE: CT of the left hip with sagittal and coronal reformations without contrast. COMPARISON: Prior radiograph FINDINGS: No acute fracture or dislocation.  Degenerative joint disease of the hip and to a less extent femoroacetabular joint.  Superior pubic rami inferior pubic rami are intact.  Pubic symphysis demonstrates degenerative joint disease.  Fat containing anterior muscular bundle.     No evidence for hip fracture or dislocation.  Senescent changes All CT scans   are performed using dose optimization techniques including the following: automated exposure control; adjustment of the mA and/or kV; use of iterative reconstruction  technique.  Dose modulation was employed for ALARA by means of: Automated exposure control; adjustment of the mA and/or kV according to patient size (this includes techniques or standardized protocols for targeted exams where dose is matched to indication/reason for exam; i.e. extremities or head); and/or use of iterative reconstructive technique. Electronically signed by: Jack Suresh Date:    05/10/2023 Time:    22:44      EKG    MICROBIOLOGY    MDM     Amount and/or Complexity of Data Reviewed  Clinical lab tests: reviewed  Tests in the radiology section of CPT®: reviewed  Tests in the medicine section of CPT®: reviewed  Discussion of test results with the performing providers: yes  Decide to obtain previous medical records or to obtain history from someone other than the patient: yes  Obtain history from someone other than the patient: yes  Review and summarize past medical records: yes  Discuss the patient with other providers: yes  Independent visualization of images, tracings, or specimens: yes

## 2023-06-09 NOTE — CONSULTS
[facility]  Cardiology  Consult Note    Patient Name: Jake Ortiz  MRN: [id number]  Admission Date: 6/8/2023  Hospital Length of Stay: 0 days  Code Status: Full Code   Attending Provider: Priyanka Mazariegos MD   Consulting Provider: Jer Rushing MD  Primary Care Physician: Byron Stevens MD  Principal Problem:Acute on chronic combined systolic and diastolic congestive heart failure    Patient information was obtained from patient and ER records.     Inpatient consult to Cardiology  Consult performed by: Jer Rushing MD  Consult ordered by: Danilo Britton NP        Subjective:     Chief Complaint:  Shortness of breath     HPI:   History of Present Illness: Jake Ortiz is a 82 y.o. male patient with a PMHx of atrial fibrillation, CHF, CKD, CAD s/p CABG, DM2, HLD, HTN, MI, NASREEN, and prostate cancer who presents to the Emergency Department because he was sent by Dr. Hood (Cardiology) for IV diuresis. Currently, the pt is taking 20 mg of Lasix at home. Pt c/o generalized weakness, SOB upon exertion, and bilateral leg swelling. Pt's wife also reports that the pt has chronic diarrhea. Pt has been having 2 episodes of diarrhea per day. Symptoms are constant and moderate in severity. No mitigating or exacerbating factors reported. Patient denies any fever, chills, cough, CP, numbness, headaches, and all other sxs at this time. No further complaints or concerns at this time.   Patient seen and examined and improved CHF and edema. Plan continued diuresis for next 24 hours.      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    #4 PROSTATE BIOPSY, LEFT APEX: ADENOCARCINOMA, FARHAD GRADE 3 + 3 = 6, IN 1 of 2 CORES 9/8/2014    Anemia     Arthritis     Atrial fibrillation 10/19/2017    Back pain     Congestive heart failure 03/05/2018    Coronary artery disease     DM (diabetes mellitus) 2018     am 06/23/2020    DM (diabetes mellitus)  2016     am 08/17/2021    Hyperlipemia     Hypertension     Myocardial infarction     NASREEN (obstructive sleep apnea) 06/05/2018    Prostate cancer 2015    Tobacco dependence     Type 2 diabetes mellitus        Past Surgical History:   Procedure Laterality Date    COLONOSCOPY N/A 9/22/2020    Procedure: COLONOSCOPY;  Surgeon: Javier Farmer MD;  Location: [facility] ENDO;  Service: Endoscopy;  Laterality: N/A;    CORONARY ARTERY BYPASS GRAFT  1987    EPIDURAL STEROID INJECTION N/A 11/22/2019    Procedure: Lumbar L5/S1 IL XUAN;  Surgeon: Tacho Trivedi MD;  [facility];  Service: Pain Management;  Laterality: N/A;    EPIDURAL STEROID INJECTION N/A 1/6/2020    Procedure: Lumbar L5/S1 IL XUAN;  Surgeon: Tacho Trivedi MD;  [facility]: [facility];  Service: Pain Management;  Laterality: N/A;    EPIDURAL STEROID INJECTION N/A 2/10/2020    Procedure: Caudal XUAN;  Surgeon: Tacho Trivedi MD;  [facility]: [facility];  Service: Pain Management;  Laterality: N/A;    ESOPHAGOGASTRODUODENOSCOPY N/A 9/22/2020    Procedure: EGD (ESOPHAGOGASTRODUODENOSCOPY);  Surgeon: Javier Farmer MD;  Location: [facility] ENDO;  Service: Endoscopy;  Laterality: N/A;    INJECTION OF ANESTHETIC AGENT AROUND MEDIAL BRANCH NERVES INNERVATING LUMBAR FACET JOINT Bilateral 10/12/2020    Procedure: Bilateral L3-5 MBB;  Surgeon: Tacho Trivedi MD;  [facility];  Service: Pain Management;  Laterality: Bilateral;    INJECTION OF ANESTHETIC AGENT AROUND MEDIAL BRANCH NERVES INNERVATING LUMBAR FACET JOINT Bilateral 12/21/2020    Procedure: Bilateral L3-5 MBB with steroid;  Surgeon: Tacho Trivedi MD;  [facility];  Service: Pain Management;  Laterality: Bilateral;    INSERTION OF SPINAL NEUROSTIMULATOR N/A 3/23/2023    Procedure: INSERTION, NEUROSTIMULATOR, SPINAL;  Surgeon: Philipp Demarco MD;  Location: [facility] OR;  Service: Pain Management;  Laterality: N/A;    INTRALUMINAL  GASTROINTESTINAL TRACT IMAGING VIA CAPSULE N/A 12/29/2021    Procedure: IMAGING PROCEDURE, GI TRACT, INTRALUMINAL, VIA CAPSULE;  Surgeon: Tahir Geronimo RN;  Location: [facility] ENDO;  Service: Endoscopy;  Laterality: N/A;    PROSTATE SURGERY      prostate radiation    RADIOFREQUENCY THERMOCOAGULATION Left 6/4/2021    Procedure: Left L3-5 Lumbar RFA;  Surgeon: Tacho Trivedi MD;  Location: [facility] PAIN MGT;  Service: Pain Management;  Laterality: Left;    RADIOFREQUENCY THERMOCOAGULATION Right 6/18/2021    Procedure: Right L3-5 Lumbar RFA;  Surgeon: Tacho Trivedi MD;  Location: [facility] PAIN MGT;  Service: Pain Management;  Laterality: Right;    REPLACEMENT OF PACEMAKER GENERATOR Left 6/16/2022    Procedure: REPLACEMENT, PULSE GENERATOR, CARDIAC PACEMAKER/CRT-P device;  Surgeon: Darrel Carrero MD;  Location: [facility] CATH LAB;  Service: Cardiology;  Laterality: Left;  NOT MRI safe   Pacer and leads implanted 9/30/2017, Dr. Souleymane CARROLLT Consulta CRT-P C4TR01, [id number]   A lead: Mdt 5076 CapSureFix® Novus, [id number]   RV lead: Mdt 5076 CapSureFix® Novus, [id number]   LV lead: Mdt 4196 At    SELECTIVE INJECTION OF ANESTHETIC AGENT AROUND LUMBAR SPINAL NERVE ROOT BY TRANSFORAMINAL APPROACH Bilateral 6/8/2022    Procedure: Bilateral L3/4 TF XUAN with RN IV sedation;  Surgeon: Philipp Demarco MD;  Location: [facility] PAIN MGT;  Service: Pain Management;  Laterality: Bilateral;    SPINE SURGERY      fusion    TONSILLECTOMY      TRIAL OF SPINAL CORD NERVE STIMULATOR N/A 1/25/2023    Procedure: Trial, Neurostimulator, Spinal Cord with RN IV sedation;  Surgeon: Philipp Demarco MD;  Location: [facility] PAIN MGT;  Service: Pain Management;  Laterality: N/A;       Review of patient's allergies indicates:  No Known Allergies    Current Facility-Administered Medications on File Prior to Encounter   Medication    ondansetron injection 4 mg     Current Outpatient Medications on File Prior to Encounter   Medication Sig    acetaminophen  (TYLENOL) 500 MG tablet Take 2 tablets (1,000 mg total) by mouth every 6 (six) hours as needed for Pain.    apixaban (ELIQUIS) 2.5 mg Tab Take 1 tablet (2.5 mg total) by mouth 2 (two) times daily.    atorvastatin (LIPITOR) 80 MG tablet Take 1 tablet (80 mg total) by mouth every evening.    cholecalciferol, vitamin D3, (VITAMIN D3) 25 mcg (1,000 unit) capsule Take 1,000 Units by mouth once daily.    clonazePAM (KLONOPIN) 1 MG tablet Take 1 tablet (1 mg total) by mouth nightly as needed for Anxiety.    ferrous sulfate (FEOSOL) 325 mg (65 mg iron) Tab tablet Take 1 tablet (325 mg total) by mouth 2 (two) times daily.    fluticasone propionate (FLONASE) 50 mcg/actuation nasal spray 2 sprays (100 mcg total) by Each Nostril route once daily.    furosemide (LASIX) 20 MG tablet Take 1 tablet (20 mg total) by mouth once daily.    krill/om-3/dha/epa/phospho/ast (MEGARED OMEGA-3 KRILL OIL ORAL) Take 1 capsule by mouth every morning.    L.acidophil,parac-S.therm-Bif. (RISAQUAD) Cap capsule Take 1 capsule by mouth once daily.    levocetirizine (XYZAL) 5 MG tablet Take 1 tablet (5 mg total) by mouth every evening.    losartan (COZAAR) 50 MG tablet Take 1 tablet (50 mg total) by mouth once daily.    multivitamin capsule Take 1 capsule by mouth once daily. Takes once a week    pantoprazole (PROTONIX) 40 MG tablet Take 1 tablet (40 mg total) by mouth once daily.    potassium chloride SA (K-DUR,KLOR-CON) 20 MEQ tablet Take 20 mEq by mouth once daily.    pregabalin (LYRICA) 75 MG capsule Take 1 capsule QHS x 1 week, then increase to BID (if tolerated).    pyridoxine, vitamin B6, (B-6) 100 MG Tab Take 50 mg by mouth once daily.    vit A/vit C/vit E/zinc/copper (OCUVITE PRESERVISION ORAL) Take 1 capsule by mouth every evening.    blood sugar diagnostic Strp Check blood glucose levels daily in the AM fasting and 1-2 times more daily.    lancets Misc Check blood glucose levels daily in the AM fasting and 1-2 times more  daily.     Family History       Problem Relation (Age of Onset)    Cataracts Mother    Diabetes Mother    Heart attack Mother    Heart disease Mother, Father, Brother    Stroke Father          Tobacco Use    Smoking status: Former     Packs/day: 1.50     Years: 20.00     Pack years: 30.00     Types: Cigarettes     Start date:      Quit date:      Years since quittin.4    Smokeless tobacco: Never   Substance and Sexual Activity    Alcohol use: No    Drug use: No    Sexual activity: Not Currently     Review of Systems   Constitutional: Negative for chills, diaphoresis, night sweats, weight gain and weight loss.   HENT:  Negative for congestion, hoarse voice, sore throat and stridor.    Eyes:  Negative for double vision and pain.   Cardiovascular:  Positive for dyspnea on exertion and leg swelling. Negative for chest pain, claudication, cyanosis, irregular heartbeat, near-syncope, orthopnea, palpitations, paroxysmal nocturnal dyspnea and syncope.   Respiratory:  Negative for cough, hemoptysis, shortness of breath, sleep disturbances due to breathing, snoring, sputum production and wheezing.    Endocrine: Negative for cold intolerance, heat intolerance and polydipsia.   Hematologic/Lymphatic: Negative for bleeding problem. Does not bruise/bleed easily.   Skin:  Negative for color change, dry skin and rash.   Musculoskeletal:  Negative for joint swelling and muscle cramps.   Gastrointestinal:  Negative for bloating, abdominal pain, constipation, diarrhea, dysphagia, melena, nausea and vomiting.   Genitourinary:  Negative for flank pain and urgency.   Neurological:  Negative for dizziness, focal weakness, headaches, light-headedness, loss of balance, seizures and weakness.   Psychiatric/Behavioral:  Negative for altered mental status and memory loss. The patient is not nervous/anxious.    Objective:     Vital Signs (Most Recent):  Temp: 100.2 °F (37.9 °C) (23 0754)  Pulse: 62 (23 0754)  Resp:  18 (06/09/23 0754)  BP: 134/62 (06/09/23 0754)  SpO2: 97 % (06/09/23 0754) Vital Signs (24h Range):  Temp:  [98 °F (36.7 °C)-100.2 °F (37.9 °C)] 100.2 °F (37.9 °C)  Pulse:  [60-89] 62  Resp:  [18-29] 18  SpO2:  [96 %-100 %] 97 %  BP: (126-168)/(62-75) 134/62     Weight: 101.8 kg (224 lb 6.9 oz)  Body mass index is 32.2 kg/m².    SpO2: 97 %         Intake/Output Summary (Last 24 hours) at 6/9/2023 1101  Last data filed at 6/9/2023 0631  Gross per 24 hour   Intake --   Output 1 ml   Net -1 ml       Lines/Drains/Airways       Peripheral Intravenous Line  Duration                  Peripheral IV - Single Lumen 06/08/23 1834 20 G Left Antecubital <1 day                     Physical Exam  Eyes:      Pupils: Pupils are equal, round, and reactive to light.   Neck:      Trachea: No tracheal deviation.   Cardiovascular:      Rate and Rhythm: Normal rate and regular rhythm.      Pulses: Intact distal pulses.           Carotid pulses are 2+ on the right side and 2+ on the left side.       Radial pulses are 2+ on the right side and 2+ on the left side.        Femoral pulses are 2+ on the right side and 2+ on the left side.       Popliteal pulses are 2+ on the right side and 2+ on the left side.        Dorsalis pedis pulses are 2+ on the right side and 2+ on the left side.        Posterior tibial pulses are 2+ on the right side and 2+ on the left side.      Heart sounds: Normal heart sounds. No murmur heard.    No friction rub. No gallop.   Pulmonary:      Effort: Pulmonary effort is normal. No respiratory distress.      Breath sounds: Normal breath sounds. No stridor. No wheezing or rales.   Chest:      Chest wall: No tenderness.   Abdominal:      General: There is no distension.      Tenderness: There is no abdominal tenderness. There is no rebound.   Musculoskeletal:         General: No tenderness.      Cervical back: Normal range of motion.      Right lower leg: Edema present.      Left lower leg: Edema present.   Skin:      General: Skin is warm and dry.   Neurological:      Mental Status: He is alert and oriented to person, place, and time.        Significant Labs: CMP   Recent Labs   Lab 06/08/23  1130 06/08/23  1551 06/09/23  0541    144 143   K 4.9 4.4 4.4   * 116* 116*   CO2 17* 18* 17*    123* 113*   BUN 18 19 18   CREATININE 1.5* 1.5* 1.4   CALCIUM 9.2 9.1 8.8   PROT  --  7.6  --    ALBUMIN  --  3.4*  --    BILITOT  --  0.4  --    ALKPHOS  --  101  --    AST  --  31  --    ALT  --  22  --    ANIONGAP 10 10 10    and Troponin   Recent Labs   Lab 06/08/23  1551 06/08/23 2024   TROPONINI 0.032* 0.040*       Significant Imaging: Echocardiogram: Transthoracic echo (TTE) complete (Cupid Only):   Results for orders placed or performed during the hospital encounter of 05/10/23   Echo   Result Value Ref Range    BSA 2.14 m2    LA WIDTH 4.20 cm    IVC diameter 2.87 cm    Left Ventricular Outflow Tract Mean Velocity 0.56 cm/s    Left Ventricular Outflow Tract Mean Gradient 1.55 mmHg    LVIDd 6.51 (A) 3.5 - 6.0 cm    IVS 0.96 0.6 - 1.1 cm    Posterior Wall 0.85 0.6 - 1.1 cm    Ao root annulus 3.83 cm    LVIDs 4.59 (A) 2.1 - 4.0 cm    FS 29 28 - 44 %    LA volume 132.20 cm3    STJ 3.46 cm    Ascending aorta 3.17 cm    LV mass 250.23 g    LA size 5.31 cm    RVDD 4.24 cm    TAPSE 1.90 cm    Left Ventricle Relative Wall Thickness 0.26 cm    AV mean gradient 6 mmHg    AV valve area 1.83 cm2    AV Velocity Ratio 0.58     AV index (prosthetic) 0.57     MV valve area p 1/2 method 2.75 cm2    E/A ratio 3.60     E wave deceleration time 276.19 msec    IVRT 51.38 msec    LVOT diameter 2.02 cm    LVOT area 3.2 cm2    LVOT peak wilian 0.91 m/s    LVOT peak VTI 16.70 cm    Ao peak wilian 1.57 m/s    Ao VTI 29.3 cm    RVOT peak wilian 0.77 m/s    RVOT peak VTI 14.5 cm    Mr max wilian 4.78 m/s    LVOT stroke volume 53.49 cm3    AV peak gradient 10 mmHg    PV mean gradient 1.03 mmHg    MV Peak E Wilian 1.08 m/s    TR Max Wilian 3.08 m/s    MV stenosis  pressure 1/2 time 80.09 ms    MV Peak A Wilian 0.30 m/s    LV Systolic Volume 96.76 mL    LV Systolic Volume Index 46.3 mL/m2    LV Diastolic Volume 216.76 mL    LV Diastolic Volume Index 103.71 mL/m2    LA Volume Index 63.3 mL/m2    LV Mass Index 120 g/m2    RA Major Axis 7.76 cm    Left Atrium Minor Axis 6.88 cm    Left Atrium Major Axis 7.07 cm    Triscuspid Valve Regurgitation Peak Gradient 38 mmHg    EF 50 %    Narrative    · Eccentric hypertrophy and low normal systolic function.  · Moderate left atrial enlargement.  · The estimated ejection fraction is 50%.  · Indeterminate left ventricular diastolic function.  · There is abnormal septal wall motion consistent with left bundle branch   block.  · Normal right ventricular size with normal right ventricular systolic   function.  · Mild-to-moderate mitral regurgitation.  · Mild to moderate tricuspid regurgitation.        Assessment and Plan:     * Acute on chronic combined systolic and diastolic congestive heart failure  Patient is identified as having Combined Systolic and Diastolic heart failure that is Acute on chronic. CHF is currently uncontrolled due to Continued edema of extremities. Latest ECHO performed and demonstrates- Results for orders placed during the hospital encounter of 05/10/23    Echo    Interpretation Summary  · Eccentric hypertrophy and low normal systolic function.  · Moderate left atrial enlargement.  · The estimated ejection fraction is 50%.  · Indeterminate left ventricular diastolic function.  · There is abnormal septal wall motion consistent with left bundle branch block.  · Normal right ventricular size with normal right ventricular systolic function.  · Mild-to-moderate mitral regurgitation.  · Mild to moderate tricuspid regurgitation.  . Continue ACE/ARB and Furosemide and monitor clinical status closely. Monitor on telemetry. Patient is on CHF pathway.  Monitor strict Is&Os and daily weights.  Place on fluid restriction of 2 L.  Continue to stress to patient importance of self efficacy and  on diet for CHF. Last BNP reviewed- and noted below   Recent Labs   Lab 06/08/23  1551   *   .      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  Per hospital medicine    Elevated troponin  Demand ischemia, trend    Atrial fibrillation with chronic anticoagulation with Eliquis  Continue eliquis    S/P CABG x 3  Follow, no angina    CAD (coronary artery disease)  Stable no angina    Hyperlipemia  Continue statin    Hypertension  Continue losartan        VTE Risk Mitigation (From admission, onward)         Ordered     apixaban tablet 2.5 mg  2 times daily         06/08/23 2010     Reason for No Pharmacological VTE Prophylaxis  Once        Question:  Reasons:  Answer:  Already adequately anticoagulated on oral Anticoagulants    06/08/23 1959     IP VTE HIGH RISK PATIENT  Once         06/08/23 1959     Place sequential compression device  Until discontinued         06/08/23 1959                Thank you for your consult. I will follow-up with patient. Please contact us if you have any additional questions.    Jer Rushing MD  Cardiology   [facility]

## 2023-06-09 NOTE — PHARMACY MED REC
"Admission Medication History     The home medication history was taken by Ramón Walker.    You may go to "Admission" then "Reconcile Home Medications" tabs to review and/or act upon these items.     The home medication list has been updated by the Pharmacy department.   Please read ALL comments highlighted in yellow.   Please address this information as you see fit.    Feel free to contact us if you have any questions or require assistance.      Medications listed below were obtained from: Patient/family and Analytic software- [employer]  (Not in a hospital admission)      Ramón Walker  [id number]    Current Outpatient Medications on File Prior to Encounter   Medication Sig Dispense Refill Last Dose    acetaminophen (TYLENOL) 500 MG tablet Take 2 tablets (1,000 mg total) by mouth every 6 (six) hours as needed for Pain.  0     apixaban (ELIQUIS) 2.5 mg Tab Take 1 tablet (2.5 mg total) by mouth 2 (two) times daily. 180 tablet 1     atorvastatin (LIPITOR) 80 MG tablet Take 1 tablet (80 mg total) by mouth every evening. 90 tablet 1     blood sugar diagnostic Strp Check blood glucose levels daily in the AM fasting and 1-2 times more daily. 300 strip 3     cholecalciferol, vitamin D3, (VITAMIN D3) 25 mcg (1,000 unit) capsule Take 1,000 Units by mouth once daily.       clonazePAM (KLONOPIN) 1 MG tablet Take 1 tablet (1 mg total) by mouth nightly as needed for Anxiety. 90 tablet 0     ferrous sulfate (FEOSOL) 325 mg (65 mg iron) Tab tablet Take 1 tablet (325 mg total) by mouth 2 (two) times daily. 60 tablet 1     fluticasone propionate (FLONASE) 50 mcg/actuation nasal spray 2 sprays (100 mcg total) by Each Nostril route once daily. 48 g 5     furosemide (LASIX) 20 MG tablet Take 1 tablet (20 mg total) by mouth once daily. 30 tablet 11     krill/om-3/dha/epa/phospho/ast (MEGARED OMEGA-3 KRILL OIL ORAL) Take 1 capsule by mouth every morning.       L.acidophil,parac-S.therm-Bif. (RISAQUAD) Cap capsule Take 1 capsule by " mouth once daily. 30 capsule 0     lancets Misc Check blood glucose levels daily in the AM fasting and 1-2 times more daily. 300 each 3     levocetirizine (XYZAL) 5 MG tablet Take 1 tablet (5 mg total) by mouth every evening. 90 tablet 1     losartan (COZAAR) 50 MG tablet Take 1 tablet (50 mg total) by mouth once daily. 90 tablet 3     multivitamin capsule Take 1 capsule by mouth once daily. Takes once a week       pantoprazole (PROTONIX) 40 MG tablet Take 1 tablet (40 mg total) by mouth once daily. 90 tablet 3     potassium chloride SA (K-DUR,KLOR-CON) 20 MEQ tablet Take 20 mEq by mouth once daily.       pregabalin (LYRICA) 75 MG capsule Take 1 capsule QHS x 1 week, then increase to BID (if tolerated). 60 capsule 1     pyridoxine, vitamin B6, (B-6) 100 MG Tab Take 50 mg by mouth once daily.       vit A/vit C/vit E/zinc/copper (OCUVITE PRESERVISION ORAL) Take 1 capsule by mouth every evening.                              .

## 2023-06-09 NOTE — ASSESSMENT & PLAN NOTE
Currently normotensive. BP usually well controlled per patient with home medications.  Plan:  -Optimize pain control   -Continue home medications ( Lasix and losartan), titrate as needed   -Monitor BP  -Low salt/cardiac diet when not NPO  -IV hydralazine prn for SBP>160 or DBP>90

## 2023-06-09 NOTE — SUBJECTIVE & OBJECTIVE
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    #4 PROSTATE BIOPSY, LEFT APEX: ADENOCARCINOMA, FARHAD GRADE 3 + 3 = 6, IN 1 of 2 CORES 9/8/2014    Anemia     Arthritis     Atrial fibrillation 10/19/2017    Back pain     Congestive heart failure 03/05/2018    Coronary artery disease     DM (diabetes mellitus) 2018     am 06/23/2020    DM (diabetes mellitus) 2016     am 08/17/2021    Hyperlipemia     Hypertension     Myocardial infarction     NASREEN (obstructive sleep apnea) 06/05/2018    Prostate cancer 2015    Tobacco dependence     Type 2 diabetes mellitus        Past Surgical History:   Procedure Laterality Date    COLONOSCOPY N/A 9/22/2020    Procedure: COLONOSCOPY;  Surgeon: Javier Farmer MD;  Location: [facility] ENDO;  Service: Endoscopy;  Laterality: N/A;    CORONARY ARTERY BYPASS GRAFT  1987    EPIDURAL STEROID INJECTION N/A 11/22/2019    Procedure: Lumbar L5/S1 IL XUAN;  Surgeon: Tacho Trivedi MD;  Location: [facility];  Service: Pain Management;  Laterality: N/A;    EPIDURAL STEROID INJECTION N/A 1/6/2020    Procedure: Lumbar L5/S1 IL XUAN;  Surgeon: Tacho Trivedi MD;  Location: [facility];  Service: Pain Management;  Laterality: N/A;    EPIDURAL STEROID INJECTION N/A 2/10/2020    Procedure: Caudal XUAN;  Surgeon: Tacho Trivedi MD;  Location: [facility];  Service: Pain Management;  Laterality: N/A;    ESOPHAGOGASTRODUODENOSCOPY N/A 9/22/2020    Procedure: EGD (ESOPHAGOGASTRODUODENOSCOPY);  Surgeon: Javier Farmer MD;  Location: [facility] ENDO;  Service: Endoscopy;  Laterality: N/A;    INJECTION OF ANESTHETIC AGENT AROUND MEDIAL BRANCH NERVES INNERVATING LUMBAR FACET JOINT Bilateral 10/12/2020    Procedure: Bilateral L3-5 MBB;  Surgeon: Tacho Trivedi MD;  Location: [facility];  Service: Pain Management;  Laterality: Bilateral;    INJECTION OF ANESTHETIC AGENT AROUND MEDIAL BRANCH NERVES INNERVATING LUMBAR  FACET JOINT Bilateral 12/21/2020    Procedure: Bilateral L3-5 MBB with steroid;  Surgeon: Tacho Trivedi MD;  Location: [facility] PAIN MGT;  Service: Pain Management;  Laterality: Bilateral;    INSERTION OF SPINAL NEUROSTIMULATOR N/A 3/23/2023    Procedure: INSERTION, NEUROSTIMULATOR, SPINAL;  Surgeon: Philipp Demarco MD;  Location: [facility] OR;  Service: Pain Management;  Laterality: N/A;    INTRALUMINAL GASTROINTESTINAL TRACT IMAGING VIA CAPSULE N/A 12/29/2021    Procedure: IMAGING PROCEDURE, GI TRACT, INTRALUMINAL, VIA CAPSULE;  Surgeon: Tahir Geronimo RN;  Location: [facility] ENDO;  Service: Endoscopy;  Laterality: N/A;    PROSTATE SURGERY      prostate radiation    RADIOFREQUENCY THERMOCOAGULATION Left 6/4/2021    Procedure: Left L3-5 Lumbar RFA;  Surgeon: Tacho Trivedi MD;  Location: [facility]IN [facility]GT;  Service: Pain Management;  Laterality: Left;    RADIOFREQUENCY THERMOCOAGULATION Right 6/18/2021    Procedure: Right L3-5 Lumbar RFA;  Surgeon: Tacho Trivedi MD;  Location: [facility] PAIN MGT;  Service: Pain Management;  Laterality: Right;    REPLACEMENT OF PACEMAKER GENERATOR Left 6/16/2022    Procedure: REPLACEMENT, PULSE GENERATOR, CARDIAC PACEMAKER/CRT-P device;  Surgeon: Darrel Carrero MD;  Location: [facility] CATH LAB;  Service: Cardiology;  Laterality: Left;  NOT MRI safe   Pacer and leads implanted 9/30/2017, Dr. Souleymane SANTACRUZ Consulta CRT-P C4TR01, [id number]   A lead: Jovan 5076 CapSureFix® Novus, [id number]   RV lead: Jovan 5076 CapSureFix® Novus, [id number]   LV lead: Jovan [id number] At    SELECTIVE INJECTION OF ANESTHETIC AGENT AROUND LUMBAR SPINAL NERVE ROOT BY TRANSFORAMINAL APPROACH Bilateral 6/8/2022    Procedure: Bilateral L3/4 TF XUAN with RN IV sedation;  Surgeon: Philipp Demarco MD;  Location: [facility] PAIN MGT;  Service: Pain Management;  Laterality: Bilateral;    SPINE SURGERY      fusion    TONSILLECTOMY      TRIAL OF SPINAL CORD NERVE STIMULATOR N/A 1/25/2023    Procedure: Trial, Neurostimulator, Spinal Cord with RN  IV sedation;  Surgeon: Philipp Demarco MD;  [facility];  Service: Pain Management;  Laterality: N/A;       Review of patient's allergies indicates:  No Known Allergies    Current Facility-Administered Medications on File Prior to Encounter   Medication    ondansetron injection 4 mg     Current Outpatient Medications on File Prior to Encounter   Medication Sig    acetaminophen (TYLENOL) 500 MG tablet Take 2 tablets (1,000 mg total) by mouth every 6 (six) hours as needed for Pain.    apixaban (ELIQUIS) 2.5 mg Tab Take 1 tablet (2.5 mg total) by mouth 2 (two) times daily.    atorvastatin (LIPITOR) 80 MG tablet Take 1 tablet (80 mg total) by mouth every evening.    cholecalciferol, vitamin D3, (VITAMIN D3) 25 mcg (1,000 unit) capsule Take 1,000 Units by mouth once daily.    clonazePAM (KLONOPIN) 1 MG tablet Take 1 tablet (1 mg total) by mouth nightly as needed for Anxiety.    ferrous sulfate (FEOSOL) 325 mg (65 mg iron) Tab tablet Take 1 tablet (325 mg total) by mouth 2 (two) times daily.    fluticasone propionate (FLONASE) 50 mcg/actuation nasal spray 2 sprays (100 mcg total) by Each Nostril route once daily.    furosemide (LASIX) 20 MG tablet Take 1 tablet (20 mg total) by mouth once daily.    krill/om-3/dha/epa/phospho/ast (MEGARED OMEGA-3 KRILL OIL ORAL) Take 1 capsule by mouth every morning.    L.acidophil,parac-S.therm-Bif. (RISAQUAD) Cap capsule Take 1 capsule by mouth once daily.    levocetirizine (XYZAL) 5 MG tablet Take 1 tablet (5 mg total) by mouth every evening.    losartan (COZAAR) 50 MG tablet Take 1 tablet (50 mg total) by mouth once daily.    multivitamin capsule Take 1 capsule by mouth once daily. Takes once a week    pantoprazole (PROTONIX) 40 MG tablet Take 1 tablet (40 mg total) by mouth once daily.    potassium chloride SA (K-DUR,KLOR-CON) 20 MEQ tablet Take 20 mEq by mouth once daily.    pregabalin (LYRICA) 75 MG capsule Take 1 capsule QHS x 1 week, then increase to BID (if tolerated).     pyridoxine, vitamin B6, (B-6) 100 MG Tab Take 50 mg by mouth once daily.    vit A/vit C/vit E/zinc/copper (OCUVITE PRESERVISION ORAL) Take 1 capsule by mouth every evening.    blood sugar diagnostic Strp Check blood glucose levels daily in the AM fasting and 1-2 times more daily.    lancets Misc Check blood glucose levels daily in the AM fasting and 1-2 times more daily.     Family History       Problem Relation (Age of Onset)    Cataracts Mother    Diabetes Mother    Heart attack Mother    Heart disease Mother, Father, Brother    Stroke Father          Tobacco Use    Smoking status: Former     Packs/day: 1.50     Years: 20.00     Pack years: 30.00     Types: Cigarettes     Start date:      Quit date:      Years since quittin.4    Smokeless tobacco: Never   Substance and Sexual Activity    Alcohol use: No    Drug use: No    Sexual activity: Not Currently     Review of Systems   Constitutional: Negative for chills, diaphoresis, night sweats, weight gain and weight loss.   HENT:  Negative for congestion, hoarse voice, sore throat and stridor.    Eyes:  Negative for double vision and pain.   Cardiovascular:  Positive for dyspnea on exertion and leg swelling. Negative for chest pain, claudication, cyanosis, irregular heartbeat, near-syncope, orthopnea, palpitations, paroxysmal nocturnal dyspnea and syncope.   Respiratory:  Negative for cough, hemoptysis, shortness of breath, sleep disturbances due to breathing, snoring, sputum production and wheezing.    Endocrine: Negative for cold intolerance, heat intolerance and polydipsia.   Hematologic/Lymphatic: Negative for bleeding problem. Does not bruise/bleed easily.   Skin:  Negative for color change, dry skin and rash.   Musculoskeletal:  Negative for joint swelling and muscle cramps.   Gastrointestinal:  Negative for bloating, abdominal pain, constipation, diarrhea, dysphagia, melena, nausea and vomiting.   Genitourinary:  Negative for flank pain and  urgency.   Neurological:  Negative for dizziness, focal weakness, headaches, light-headedness, loss of balance, seizures and weakness.   Psychiatric/Behavioral:  Negative for altered mental status and memory loss. The patient is not nervous/anxious.    Objective:     Vital Signs (Most Recent):  Temp: 100.2 °F (37.9 °C) (06/09/23 0754)  Pulse: 62 (06/09/23 0754)  Resp: 18 (06/09/23 0754)  BP: 134/62 (06/09/23 0754)  SpO2: 97 % (06/09/23 0754) Vital Signs (24h Range):  Temp:  [98 °F (36.7 °C)-100.2 °F (37.9 °C)] 100.2 °F (37.9 °C)  Pulse:  [60-89] 62  Resp:  [18-29] 18  SpO2:  [96 %-100 %] 97 %  BP: (126-168)/(62-75) 134/62     Weight: 101.8 kg (224 lb 6.9 oz)  Body mass index is 32.2 kg/m².    SpO2: 97 %         Intake/Output Summary (Last 24 hours) at 6/9/2023 1101  Last data filed at 6/9/2023 0631  Gross per 24 hour   Intake --   Output 1 ml   Net -1 ml       Lines/Drains/Airways       Peripheral Intravenous Line  Duration                  Peripheral IV - Single Lumen 06/08/23 1834 20 G Left Antecubital <1 day                     Physical Exam  Eyes:      Pupils: Pupils are equal, round, and reactive to light.   Neck:      Trachea: No tracheal deviation.   Cardiovascular:      Rate and Rhythm: Normal rate and regular rhythm.      Pulses: Intact distal pulses.           Carotid pulses are 2+ on the right side and 2+ on the left side.       Radial pulses are 2+ on the right side and 2+ on the left side.        Femoral pulses are 2+ on the right side and 2+ on the left side.       Popliteal pulses are 2+ on the right side and 2+ on the left side.        Dorsalis pedis pulses are 2+ on the right side and 2+ on the left side.        Posterior tibial pulses are 2+ on the right side and 2+ on the left side.      Heart sounds: Normal heart sounds. No murmur heard.    No friction rub. No gallop.   Pulmonary:      Effort: Pulmonary effort is normal. No respiratory distress.      Breath sounds: Normal breath sounds. No  stridor. No wheezing or rales.   Chest:      Chest wall: No tenderness.   Abdominal:      General: There is no distension.      Tenderness: There is no abdominal tenderness. There is no rebound.   Musculoskeletal:         General: No tenderness.      Cervical back: Normal range of motion.      Right lower leg: Edema present.      Left lower leg: Edema present.   Skin:     General: Skin is warm and dry.   Neurological:      Mental Status: He is alert and oriented to person, place, and time.        Significant Labs: CMP   Recent Labs   Lab 06/08/23  1130 06/08/23  1551 06/09/23  0541    144 143   K 4.9 4.4 4.4   * 116* 116*   CO2 17* 18* 17*    123* 113*   BUN 18 19 18   CREATININE 1.5* 1.5* 1.4   CALCIUM 9.2 9.1 8.8   PROT  --  7.6  --    ALBUMIN  --  3.4*  --    BILITOT  --  0.4  --    ALKPHOS  --  101  --    AST  --  31  --    ALT  --  22  --    ANIONGAP 10 10 10    and Troponin   Recent Labs   Lab 06/08/23  1551 06/08/23 2024   TROPONINI 0.032* 0.040*       Significant Imaging: Echocardiogram: Transthoracic echo (TTE) complete ([facility]upid Only):   Results for orders placed or performed during the hospital encounter of 05/10/23   Echo   Result Value Ref Range    BSA 2.14 m2    LA WIDTH 4.20 cm    IVC diameter 2.87 cm    Left Ventricular Outflow Tract Mean Velocity 0.56 cm/s    Left Ventricular Outflow Tract Mean Gradient 1.55 mmHg    LVIDd 6.51 (A) 3.5 - 6.0 cm    IVS 0.96 0.6 - 1.1 cm    Posterior Wall 0.85 0.6 - 1.1 cm    Ao root annulus 3.83 cm    LVIDs 4.59 (A) 2.1 - 4.0 cm    FS 29 28 - 44 %    LA volume 132.20 cm3    STJ 3.46 cm    Ascending aorta 3.17 cm    LV mass 250.23 g    LA size 5.31 cm    RVDD 4.24 cm    TAPSE 1.90 cm    Left Ventricle Relative Wall Thickness 0.26 cm    AV mean gradient 6 mmHg    AV valve area 1.83 cm2    AV Velocity Ratio 0.58     AV index (prosthetic) 0.57     MV valve area p 1/2 method 2.75 cm2    E/A ratio 3.60     E wave deceleration time 276.19 msec    IVRT 51.38  msec    LVOT diameter 2.02 cm    LVOT area 3.2 cm2    LVOT peak wilian 0.91 m/s    LVOT peak VTI 16.70 cm    Ao peak wilian 1.57 m/s    Ao VTI 29.3 cm    RVOT peak wilian 0.77 m/s    RVOT peak VTI 14.5 cm    Mr max wilian 4.78 m/s    LVOT stroke volume 53.49 cm3    AV peak gradient 10 mmHg    PV mean gradient 1.03 mmHg    MV Peak E Wilian 1.08 m/s    TR Max Wilian 3.08 m/s    MV stenosis pressure 1/2 time 80.09 ms    MV Peak A Wilian 0.30 m/s    LV Systolic Volume 96.76 mL    LV Systolic Volume Index 46.3 mL/m2    LV Diastolic Volume 216.76 mL    LV Diastolic Volume Index 103.71 mL/m2    LA Volume Index 63.3 mL/m2    LV Mass Index 120 g/m2    RA Major Axis 7.76 cm    Left Atrium Minor Axis 6.88 cm    Left Atrium Major Axis 7.07 cm    Triscuspid Valve Regurgitation Peak Gradient 38 mmHg    EF 50 %    Narrative    · Eccentric hypertrophy and low normal systolic function.  · Moderate left atrial enlargement.  · The estimated ejection fraction is 50%.  · Indeterminate left ventricular diastolic function.  · There is abnormal septal wall motion consistent with left bundle branch   block.  · Normal right ventricular size with normal right ventricular systolic   function.  · Mild-to-moderate mitral regurgitation.  · Mild to moderate tricuspid regurgitation.

## 2023-06-09 NOTE — ASSESSMENT & PLAN NOTE
Troponin elevated to 0.032.  Elevation likely secondary to ischemic demand from acute CHF exacerbation.   Plan:  - tele monitoring   -trend troponin   - repeat EKG p.r.n.   - nitro p.r.n.   - analgesics p.r.n.   - cards consult in a.m.

## 2023-06-09 NOTE — ASSESSMENT & PLAN NOTE
Patient with Paroxysmal (<7 days) atrial fibrillation which is controlled currently with Pacemaker. Patient is currently in  Ventricular paced rhythm.[id number] Score: 4. HASBLED Score: . Anticoagulation indicated. Anticoagulation done with Eliquis.

## 2023-06-09 NOTE — HPI
Jake Ortiz is a 82 y.o. 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 Presented to the ED for further evaluation at the request of his cardiologist, (Dr. Hood) for IV diuresis for CHF exacerbation.  Patient reports he was just discharged from skilled nursing facility 2 days ago on 06/06/2023 after being there for roughly 1 month for generalized weakness secondary to dehydration from over diuresing and UTI.  Patient states that he still feeling generally weak and feeling more short of breath and has developed worsening bilateral lower extremity edema over the last couple of days.  Patient states that he was initially on 20 mg Lasix b.I.d., but that dosing was cut in half due to VAIBHAV which resulted in the patient being hospitalized and eventually being sent to a skilled nursing facility.  Patient states that he has been compliant with his 20 mg Lasix q.d., but states he has not been urinating and has had worsening shortness of breath and fluid retention to his bilateral lower extremities.  Patient continues to ambulate with walker and states that he gets short-winded after walking a few feet.  Patient denies use of oxygen at home nor does he use a CPAP machine even though upon chart review showed that patient is supposed to wear CPAP q.h.s..  Patient states that he was awaiting for the machine to be approved/delivered.  Otherwise, patient denies any fevers, chills, sweats, chest pain, palpitations, dysuria, or any other symptoms at this time.    ER workup revealed H/H of 9.4/31.4 (stable with history of  anemia), BUN/creatinine of 19/1.5 with EGFR 47 (previously 46/2.2 with EGFR 30 on 05/29/2023), BNP of 415, troponin of 0.032, and UA revealing >100 RBC, +3 occult blood, +1 protein.  Chest x-ray revealed cardiomegaly with small bilateral pleural effusions correlating with underlying pleural fluid versus infiltrate vs edema.  EKG revealed ventricular paced rhythm at a rate of 65 beats per minute with a QT/QTC of 432/449.  Hospital Medicine consulted to admit patient for CHF exacerbation.  Patient and wife at bedside are in agreement with treatment plan.  Patient will be placed under observation status.    PCP: Byron Stevens

## 2023-06-09 NOTE — PROGRESS NOTES
[facility] Medicine  Progress Note    Patient Name: Jake Ortiz  MRN: [id number]  Patient Class: IP- Inpatient   Admission Date: 6/8/2023  Length of Stay: 0 days  Attending Physician: Priyanka Mazariegos MD  Primary Care Provider: Byron Stevens MD        Subjective:     Principal Problem:Acute on chronic combined systolic and diastolic congestive heart failure        HPI:  Jake Ortiz is a 82 y.o. 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 Presented to the ED for further evaluation at the request of his cardiologist, (Dr. Hood) for IV diuresis for CHF exacerbation.  Patient reports he was just discharged from skilled nursing facility 2 days ago on 06/06/2023 after being there for roughly 1 month for generalized weakness secondary to dehydration from over diuresing and UTI.  Patient states that he still feeling generally weak and feeling more short of breath and has developed worsening bilateral lower extremity edema over the last couple of days.  Patient states that he was initially on 20 mg Lasix b.I.d., but that dosing was cut in half due to VAIBHAV which resulted in the patient being hospitalized and eventually being sent to a skilled nursing facility.  Patient states that he has been compliant with his 20 mg Lasix q.d., but states he has not been urinating and has had worsening shortness of breath and fluid retention to his bilateral lower extremities.  Patient continues to ambulate with walker and states that he gets short-winded after walking a few feet.  Patient denies use of  oxygen at home nor does he use a CPAP machine even though upon chart review showed that patient is supposed to wear CPAP q.h.s..  Patient states that he was awaiting for the machine to be approved/delivered.  Otherwise, patient denies any fevers, chills, sweats, chest pain, palpitations, dysuria, or any other symptoms at this time.    ER workup revealed H/H of 9.4/31.4 (stable with history of anemia), BUN/creatinine of 19/1.5 with EGFR 47 (previously 46/2.2 with EGFR 30 on 05/29/2023), BNP of 415, troponin of 0.032, and UA revealing >100 RBC, +3 occult blood, +1 protein.  Chest x-ray revealed cardiomegaly with small bilateral pleural effusions correlating with underlying pleural fluid versus infiltrate vs edema.  EKG revealed ventricular paced rhythm at a rate of 65 beats per minute with a QT/QTC of 432/449.  Hospital Medicine consulted to admit patient for CHF exacerbation.  Patient and wife at bedside are in agreement with treatment plan.  Patient will be placed under observation status.    PCP: Byron Stevens        Overview/Hospital Course:  Patient admitted to observation for acute on chronic combined systolic and diastolic congestive heart failure.  Imaging supports Cardiomegaly with small bilateral pleural effusions with increased hazy bibasilar opacity which may correlate with layering pleural fluid versus infiltrate or edema.  IV Lasix initiated.  Previous echo results from 05/11/2023 reviewed.  Cardiology consulted.  Troponin elevated and flat secondary to volume strain. Of note, patient Rupert recently discharged from [facility].  Current plan of care continued as previously prescribed      Interval History:  Patient in bed upon exam and reports shortness of breath with mobility and bilateral lower extremity edema.  Wife reports recent consumption of sodium rich foods.  Diuresis in progress.  Cardiology following.    Review of Systems   Constitutional:  Positive for fatigue.  Negative for chills, diaphoresis and fever.   HENT:  Negative for congestion, postnasal drip, sinus pressure and trouble swallowing.    Respiratory:  Positive for shortness of breath. Negative for cough and wheezing.    Cardiovascular:  Positive for leg swelling. Negative for chest pain and palpitations.   Gastrointestinal:  Negative for abdominal distention, abdominal pain, diarrhea and vomiting.   Genitourinary:  Negative for difficulty urinating, flank pain, frequency and urgency.   Musculoskeletal:  Positive for arthralgias and gait problem (uses walker).   Skin:  Negative for wound.   Neurological:  Negative for dizziness and weakness.   Psychiatric/Behavioral:  Negative for agitation and confusion. The patient is not nervous/anxious.    All other systems reviewed and are negative.  Objective:     Vital Signs (Most Recent):  Temp: 97.9 °F (36.6 °C) (06/09/23 1146)  Pulse: 70 (06/09/23 1146)  Resp: 19 (06/09/23 1146)  BP: 129/60 (06/09/23 1146)  SpO2: 96 % (06/09/23 1146) Vital Signs (24h Range):  Temp:  [97.9 °F (36.6 °C)-100.2 °F (37.9 °C)] 97.9 °F (36.6 °C)  Pulse:  [60-74] 70  Resp:  [18-29] 19  SpO2:  [96 %-100 %] 96 %  BP: (126-168)/(60-75) 129/60     Weight: 101.8 kg (224 lb 6.9 oz)  Body mass index is 32.2 kg/m².    Intake/Output Summary (Last 24 hours) at 6/9/2023 1505  Last data filed at 6/9/2023 1345  Gross per 24 hour   Intake 360 ml   Output 1 ml   Net 359 ml         Physical Exam  Vitals and nursing note reviewed.   Constitutional:       General: He is awake. He is not in acute distress.     Appearance: Normal appearance. He is well-developed and well-groomed. He is not ill-appearing, toxic-appearing or diaphoretic.      Comments:   Elderly male resting comfortably in stretcher in no acute distress   HENT:      Head: Normocephalic and atraumatic.   Eyes:      Extraocular Movements: Extraocular movements intact.      Conjunctiva/sclera: Conjunctivae normal.   Cardiovascular:      Rate and Rhythm:  Normal rate and regular rhythm.      Pulses: Normal pulses.           Radial pulses are 2+ on the right side and 2+ on the left side.        Dorsalis pedis pulses are 2+ on the right side and 2+ on the left side.      Heart sounds: Normal heart sounds. No murmur heard.  Pulmonary:      Effort: Pulmonary effort is normal.      Breath sounds: Decreased breath sounds and rales (scant rales in bilat lower lobes) present. No wheezing or rhonchi.   Abdominal:      General: Bowel sounds are normal.      Palpations: Abdomen is soft.      Tenderness: There is no abdominal tenderness.   Genitourinary:     Comments: Deferred   Musculoskeletal:      Cervical back: Normal range of motion and neck supple.      Right lower le+ Edema present.      Left lower le+ Edema present.      Comments: 5/5 strength throughout   Skin:     General: Skin is warm and dry.      Capillary Refill: Capillary refill takes less than 2 seconds.   Neurological:      General: No focal deficit present.      Mental Status: He is alert and oriented to person, place, and time. Mental status is at baseline.      GCS: GCS eye subscore is 4. GCS verbal subscore is 5. GCS motor subscore is 6.      Cranial Nerves: Cranial nerves 2-12 are intact.      Sensory: Sensation is intact.      Motor: Motor function is intact.   Psychiatric:         Mood and Affect: Mood normal.         Behavior: Behavior normal. Behavior is cooperative.           Significant Labs: All pertinent labs within the past 24 hours have been reviewed.  CBC:   Recent Labs   Lab 23  1130 23  1551 23  0542   WBC 7.45 7.36 5.03   HGB 9.1* 9.4* 8.3*   HCT 31.5* 31.4* 28.1*    233 202     CMP:   Recent Labs   Lab 23  1130 23  1551 23  0541    144 143   K 4.9 4.4 4.4   * 116* 116*   CO2 17* 18* 17*    123* 113*   BUN 18 19 18   CREATININE 1.5* 1.5* 1.4   CALCIUM 9.2 9.1 8.8   PROT  --  7.6  --    ALBUMIN  --  3.4*  --    BILITOT  --   0.4  --    ALKPHOS  --  101  --    AST  --  31  --    ALT  --  22  --    ANIONGAP 10 10 10     Troponin:   Recent Labs   Lab 06/08/23  1551 06/08/23 2024   TROPONINI 0.032* 0.040*       Significant Imaging: I have reviewed all pertinent imaging results/findings within the past 24 hours.      Assessment/Plan:      * Acute on chronic combined systolic and diastolic congestive heart failure  Patient is identified as having Combined Systolic and Diastolic heart failure that is Acute on chronic. CHF is currently uncontrolled due to Continued edema of extremities, >3 pillow orthopnea, Rales/crackles on pulmonary exam and Pulmonary edema/pleural effusion on CXR. Latest ECHO performed and demonstrates- Results for orders placed during the hospital encounter of 05/10/23    Echo    Interpretation Summary  · Eccentric hypertrophy and low normal systolic function.  · Moderate left atrial enlargement.  · The estimated ejection fraction is 50%.  · Indeterminate left ventricular diastolic function.  · There is abnormal septal wall motion consistent with left bundle branch block.  · Normal right ventricular size with normal right ventricular systolic function.  · Mild-to-moderate mitral regurgitation.  · Mild to moderate tricuspid regurgitation.  . Continue ACE/ARB and Furosemide and monitor clinical status closely. Monitor on telemetry. Patient is off CHF pathway.  Monitor strict Is&Os and daily weights.  Place on fluid restriction of 1.5 L. Continue to stress to patient importance of self efficacy and  on diet for CHF. Last BNP reviewed- and noted below   Recent Labs   Lab 06/08/23  1551   *     Reports little diuresing from home Lasix dosing  20 mg q.d. received 40 mg Lasix IV in ED. Monitor overnight for urinary output.  May benefit in changing diuretic to Bumex to achieve desired diuretic effect.  -6/9/23- diuresis continued- Cardiology following- reports recent consumption of sodium rich foods- compliance with  low sodium diet and fluid restriction encouraged       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  Appears near baseline without evidence of active bleeding noted.   Recent Labs   Lab 06/08/23  1130 06/08/23  1551 06/09/23  0542   HGB 9.1* 9.4* 8.3*   HCT 31.5* 31.4* 28.1*   * 98 97   MCHC 28.9* 29.9* 29.5*   RDW 18.1* 17.9* 17.8*    233 202     Plan:  -Monitor H/H and plts  -Type and screen, transfuse as needed   -Continue home medications   -Hold antiplatelets/anticoagulants in setting of active bleeding/pending surgical intervention           Elevated troponin  Troponin elevated to 0.032.  Elevation likely secondary to ischemic demand from acute CHF exacerbation.   Plan:  - tele monitoring   -trend troponin- elevated anf flat  - repeat EKG p.r.n.   - nitro p.r.n.   - analgesics p.r.n.   - cards consulted   -diurese in progress      Obstructive sleep apnea  Awaiting approval for CPAP machine.  Not currently wearing any supplemental oxygen during the day or while sleeping at night.  In no acute respiratory distress.  Satting 100% on room air.    Plan:  - CPAP q.h.s. p.r.n.      Chronic restrictive lung disease  Patient with history of  Chronic restrictive lung disease not currently on inhalers or home oxygen not presently in acute exacerbation and is without signs/symptoms of distress.   Patient currently saturating 100 % on room air.  Plan:  -Continue home medications, titrate as needed  -Titrate oxygen requirements as needed  -Incentive spirometry   -Monitor pulse oximetry  -Duonebs prn          Atrial fibrillation with chronic anticoagulation with Eliquis  Patient with Paroxysmal (<7 days) atrial fibrillation which is controlled currently with Pacemaker. Patient is currently in  Ventricular paced rhythm.[id number] Score: 4. HASBLED Score: . Anticoagulation indicated. Anticoagulation done with Eliquis.        S/P CABG x 3  -home medications resumed    MI, old  -home medications resumed      CAD  (coronary artery disease)  Patient with known CAD s/p CABG, which is controlled Will continue Eliquis and Statin and monitor for S/Sx of angina/ACS. Continue to monitor on telemetry.         Hyperlipemia  Patient is chronically on statin.will continue for now. Last Lipid Panel:   Lab Results   Component Value Date    CHOL 124 07/14/2022    HDL 43 07/14/2022    LDLCALC 54.4 (L) 07/14/2022    TRIG 133 07/14/2022    CHOLHDL 34.7 07/14/2022     Plan:  -Continue home medication  -low fat/low calorie diet        Hypertension  Currently normotensive. BP usually well controlled per patient with home medications.  Plan:  -Optimize pain control   -Continue home medications ( Lasix and losartan), titrate as needed   -Monitor BP  -Low salt/cardiac diet  -IV hydralazine prn for SBP>160 or DBP>90             VTE Risk Mitigation (From admission, onward)         Ordered     apixaban tablet 2.5 mg  2 times daily         06/08/23 2010     Reason for No Pharmacological VTE Prophylaxis  Once        Question:  Reasons:  Answer:  Already adequately anticoagulated on oral Anticoagulants    06/08/23 1959     IP VTE HIGH RISK PATIENT  Once         06/08/23 1959     Place sequential compression device  Until discontinued         06/08/23 1959                Discharge Planning   LITTLE:      Code Status: Full Code   Is the patient medically ready for discharge?:     Reason for patient still in hospital (select all that apply): Patient trending condition, Laboratory test, Treatment and Consult recommendations  Discharge Plan A: Home Health                  Marybeth Damon NP  Department of Hospital Medicine   [place] - Med Surg

## 2023-06-09 NOTE — ASSESSMENT & PLAN NOTE
Patient is identified as having Combined Systolic and Diastolic heart failure that is Acute on chronic. CHF is currently uncontrolled due to Continued edema of extremities. Latest ECHO performed and demonstrates- Results for orders placed during the hospital encounter of 05/10/23    Echo    Interpretation Summary  · Eccentric hypertrophy and low normal systolic function.  · Moderate left atrial enlargement.  · The estimated ejection fraction is 50%.  · Indeterminate left ventricular diastolic function.  · There is abnormal septal wall motion consistent with left bundle branch block.  · Normal right ventricular size with normal right ventricular systolic function.  · Mild-to-moderate mitral regurgitation.  · Mild to moderate tricuspid regurgitation.  . Continue ACE/ARB and Furosemide and monitor clinical status closely. Monitor on telemetry. Patient is on CHF pathway.  Monitor strict Is&Os and daily weights.  Place on fluid restriction of 2 L. Continue to stress to patient importance of self efficacy and  on diet for CHF. Last BNP reviewed- and noted below   Recent Labs   Lab 06/08/23  1551   *   .

## 2023-06-09 NOTE — ASSESSMENT & PLAN NOTE
Patient is identified as having Combined Systolic and Diastolic heart failure that is Acute on chronic. CHF is currently uncontrolled due to Continued edema of extremities, >3 pillow orthopnea, Rales/crackles on pulmonary exam and Pulmonary edema/pleural effusion on CXR. Latest ECHO performed and demonstrates- Results for orders placed during the hospital encounter of 05/10/23    Echo    Interpretation Summary  · Eccentric hypertrophy and low normal systolic function.  · Moderate left atrial enlargement.  · The estimated ejection fraction is 50%.  · Indeterminate left ventricular diastolic function.  · There is abnormal septal wall motion consistent with left bundle branch block.  · Normal right ventricular size with normal right ventricular systolic function.  · Mild-to-moderate mitral regurgitation.  · Mild to moderate tricuspid regurgitation.  . Continue ACE/ARB and Furosemide and monitor clinical status closely. Monitor on telemetry. Patient is off CHF pathway.  Monitor strict Is&Os and daily weights.  Place on fluid restriction of 1.5 L. Continue to stress to patient importance of self efficacy and  on diet for CHF. Last BNP reviewed- and noted below   Recent Labs   Lab 06/08/23  1551   *     Reports little diuresing from home Lasix dosing  20 mg q.d. received 40 mg Lasix IV in ED. Monitor overnight for urinary output.  May benefit in changing diuretic to Bumex to achieve desired diuretic effect.  -6/9/23- diuresis continued- Cardiology following- reports recent consumption of sodium rich foods- compliance with low sodium diet and fluid restriction encouraged

## 2023-06-09 NOTE — H&P
Counts include 234 beds at the [facility] - Emergency Dept.  [facility] Medicine  History & Physical    Patient Name: Jake Ortiz  MRN: [id number]  Patient Class: OP- Observation  Admission Date: 6/8/2023  Attending Physician: Tavo Brown MD   Primary Care Provider: Byron Stevens MD         Patient information was obtained from patient, spouse/SO, past medical records and ER records.     Subjective:     Principal Problem:Acute on chronic combined systolic and diastolic congestive heart failure    Chief Complaint:   Chief Complaint   Patient presents with    Shortness of Breath     Pt sent here by cardiologist for CHF exacerbation workup. Pt reports swelling in BLE and feels SOB. Pt states his kidneys can't tolerate Lasix        HPI: Jake Ortiz is a 82 y.o. 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 Presented to the ED for further evaluation at the request of his cardiologist, (Dr. Hood) for IV diuresis for CHF exacerbation.  Patient reports he was just discharged from skilled nursing facility 2 days ago on 06/06/2023 after being there for roughly 1 month for generalized weakness secondary to dehydration from over diuresing and UTI.  Patient states that he still feeling generally weak and feeling more short of breath and has developed worsening bilateral lower extremity edema over the last couple of days.  Patient states that he was initially on 20 mg Lasix b.I.d., but that dosing was cut in half due to VAIBHAV which resulted in the patient being hospitalized and eventually being sent to a skilled nursing facility.  Patient states that  he has been compliant with his 20 mg Lasix q.d., but states he has not been urinating and has had worsening shortness of breath and fluid retention to his bilateral lower extremities.  Patient continues to ambulate with walker and states that he gets short-winded after walking a few feet.  Patient denies use of oxygen at home nor does he use a CPAP machine even though upon chart review showed that patient is supposed to wear CPAP q.h.s..  Patient states that he was awaiting for the machine to be approved/delivered.  Otherwise, patient denies any fevers, chills, sweats, chest pain, palpitations, dysuria, or any other symptoms at this time.    ER workup revealed H/H of 9.4/31.4 (stable with history of anemia), BUN/creatinine of 19/1.5 with EGFR 47 (previously 46/2.2 with EGFR 30 on 05/29/2023), BNP of 415, troponin of 0.032, and UA revealing >100 RBC, +3 occult blood, +1 protein.  Chest x-ray revealed cardiomegaly with small bilateral pleural effusions correlating with underlying pleural fluid versus infiltrate vs edema.  EKG revealed ventricular paced rhythm at a rate of 65 beats per minute with a QT/QTC of 432/449.  Hospital Medicine consulted to admit patient for CHF exacerbation.  Patient and wife at bedside are in agreement with treatment plan.  Patient will be placed under observation status.    PCP: Byron Stevens        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    #4 PROSTATE BIOPSY, LEFT APEX: ADENOCARCINOMA, FARHAD GRADE 3 + 3 = 6, IN 1 of 2 CORES 9/8/2014    Anemia     Arthritis     Atrial fibrillation 10/19/2017    Back pain     Congestive heart failure 03/05/2018    Coronary artery disease     DM (diabetes mellitus) 2018     am 06/23/2020    DM (diabetes mellitus) 2016     am 08/17/2021    Hyperlipemia     Hypertension     Myocardial infarction     NASREEN (obstructive sleep apnea) 06/05/2018    Prostate cancer 2015     Tobacco dependence     Type 2 diabetes mellitus        Past Surgical History:   Procedure Laterality Date    COLONOSCOPY N/A 9/22/2020    Procedure: COLONOSCOPY;  Surgeon: Javier Farmer MD;  Location: [facility] ENDO;  Service: Endoscopy;  Laterality: N/A;    CORONARY ARTERY BYPASS GRAFT  1987    EPIDURAL STEROID INJECTION N/A 11/22/2019    Procedure: Lumbar L5/S1 IL XUAN;  Surgeon: Tacho Trivedi MD;  Location: [facility]GT;  Service: Pain Management;  Laterality: N/A;    EPIDURAL STEROID INJECTION N/A 1/6/2020    Procedure: Lumbar L5/S1 IL XUAN;  Surgeon: Tacho Trivedi MD;  Location: [facility]GT;  Service: Pain Management;  Laterality: N/A;    EPIDURAL STEROID INJECTION N/A 2/10/2020    Procedure: Caudal XUAN;  Surgeon: Tacho Trivedi MD;  Location: [facility];  Service: Pain Management;  Laterality: N/A;    ESOPHAGOGASTRODUODENOSCOPY N/A 9/22/2020    Procedure: EGD (ESOPHAGOGASTRODUODENOSCOPY);  Surgeon: Javier Farmer MD;  Location: [facility] ENDO;  Service: Endoscopy;  Laterality: N/A;    INJECTION OF ANESTHETIC AGENT AROUND MEDIAL BRANCH NERVES INNERVATING LUMBAR FACET JOINT Bilateral 10/12/2020    Procedure: Bilateral L3-5 MBB;  Surgeon: Tacho Trivedi MD;  Location: [facility];  Service: Pain Management;  Laterality: Bilateral;    INJECTION OF ANESTHETIC AGENT AROUND MEDIAL BRANCH NERVES INNERVATING LUMBAR FACET JOINT Bilateral 12/21/2020    Procedure: Bilateral L3-5 MBB with steroid;  Surgeon: Tacho Trivedi MD;  [facility];  Service: Pain Management;  Laterality: Bilateral;    INSERTION OF SPINAL NEUROSTIMULATOR N/A 3/23/2023    Procedure: INSERTION, NEUROSTIMULATOR, SPINAL;  Surgeon: Philipp Demarco MD;  Location: [facility] OR;  Service: Pain Management;  Laterality: N/A;    INTRALUMINAL GASTROINTESTINAL TRACT IMAGING VIA CAPSULE N/A 12/29/2021    Procedure: IMAGING PROCEDURE, GI TRACT, INTRALUMINAL, VIA CAPSULE;  Surgeon: Tahir Geronimo RN;  [facility];  Service: Endoscopy;  Laterality: N/A;    PROSTATE SURGERY      prostate radiation    RADIOFREQUENCY THERMOCOAGULATION Left 6/4/2021    Procedure: Left L3-5 Lumbar RFA;  Surgeon: Tacho Trivedi MD;  Location: [facility] PAIN MGT;  Service: Pain Management;  Laterality: Left;    RADIOFREQUENCY THERMOCOAGULATION Right 6/18/2021    Procedure: Right L3-5 Lumbar RFA;  Surgeon: Tacho Trivedi MD;  Location: [facility] PAIN MGT;  Service: Pain Management;  Laterality: Right;    REPLACEMENT OF PACEMAKER GENERATOR Left 6/16/2022    Procedure: REPLACEMENT, PULSE GENERATOR, CARDIAC PACEMAKER/CRT-P device;  Surgeon: Darrel Carrero MD;  Location: [facility] [place] CATH LAB;  Service: Cardiology;  Laterality: Left;  NOT MRI safe   Pacer and leads implanted 9/30/2017, Dr. Souleymane CARROLLT Consulta CRT-P C4TR01, [id number]   A lead: Mdt 5076 CapSureFix® Novus, [id number]   RV lead: Mdt 5076 CapSureFix® Novus, [id number]   LV lead: Mdt 4196 At    SELECTIVE INJECTION OF ANESTHETIC AGENT AROUND LUMBAR SPINAL NERVE ROOT BY TRANSFORAMINAL APPROACH Bilateral 6/8/2022    Procedure: Bilateral L3/4 TF XUAN with RN IV sedation;  Surgeon: Philipp Demarco MD;  Location: [facility] PAIN MGT;  Service: Pain Management;  Laterality: Bilateral;    SPINE SURGERY      fusion    TONSILLECTOMY      TRIAL OF SPINAL CORD NERVE STIMULATOR N/A 1/25/2023    Procedure: Trial, Neurostimulator, Spinal Cord with RN IV sedation;  Surgeon: Philipp Demarco MD;  Location: [facility] PAIN MGT;  Service: Pain Management;  Laterality: N/A;       Review of patient's allergies indicates:  No Known Allergies    Current Facility-Administered Medications on File Prior to Encounter   Medication    ondansetron injection 4 mg     Current Outpatient Medications on File Prior to Encounter   Medication Sig    acetaminophen (TYLENOL) 500 MG tablet Take 2 tablets (1,000 mg total) by mouth every 6 (six) hours as needed for Pain.    apixaban (ELIQUIS) 2.5 mg Tab Take 1 tablet (2.5 mg total)  by mouth 2 (two) times daily.    atorvastatin (LIPITOR) 80 MG tablet Take 1 tablet (80 mg total) by mouth every evening.    cholecalciferol, vitamin D3, (VITAMIN D3) 25 mcg (1,000 unit) capsule Take 1,000 Units by mouth once daily.    clonazePAM (KLONOPIN) 1 MG tablet Take 1 tablet (1 mg total) by mouth nightly as needed for Anxiety.    ferrous sulfate (FEOSOL) 325 mg (65 mg iron) Tab tablet Take 1 tablet (325 mg total) by mouth 2 (two) times daily.    fluticasone propionate (FLONASE) 50 mcg/actuation nasal spray 2 sprays (100 mcg total) by Each Nostril route once daily.    furosemide (LASIX) 20 MG tablet Take 1 tablet (20 mg total) by mouth once daily.    krill/om-3/dha/epa/phospho/ast (MEGARED OMEGA-3 KRILL OIL ORAL) Take 1 capsule by mouth every morning.    L.acidophil,parac-S.therm-Bif. (RISAQUAD) Cap capsule Take 1 capsule by mouth once daily.    levocetirizine (XYZAL) 5 MG tablet Take 1 tablet (5 mg total) by mouth every evening.    losartan (COZAAR) 50 MG tablet Take 1 tablet (50 mg total) by mouth once daily.    multivitamin capsule Take 1 capsule by mouth once daily. Takes once a week    pantoprazole (PROTONIX) 40 MG tablet Take 1 tablet (40 mg total) by mouth once daily.    potassium chloride SA (K-DUR,KLOR-CON) 20 MEQ tablet Take 20 mEq by mouth once daily.    pregabalin (LYRICA) 75 MG capsule Take 1 capsule QHS x 1 week, then increase to BID (if tolerated).    pyridoxine, vitamin B6, (B-6) 100 MG Tab Take 50 mg by mouth once daily.    vit A/vit C/vit E/zinc/copper (OCUVITE PRESERVISION ORAL) Take 1 capsule by mouth every evening.    blood sugar diagnostic Strp Check blood glucose levels daily in the AM fasting and 1-2 times more daily.    lancets Misc Check blood glucose levels daily in the AM fasting and 1-2 times more daily.     Family History       Problem Relation (Age of Onset)    Cataracts Mother    Diabetes Mother    Heart attack Mother    Heart disease Mother, Father, Brother     Stroke Father          Tobacco Use    Smoking status: Former     Packs/day: 1.50     Years: 20.00     Pack years: 30.00     Types: Cigarettes     Start date:      Quit date:      Years since quittin.4    Smokeless tobacco: Never   Substance and Sexual Activity    Alcohol use: No    Drug use: No    Sexual activity: Not Currently     Review of Systems   Constitutional:  Positive for fatigue. Negative for chills, diaphoresis and fever.   Respiratory:  Positive for shortness of breath. Negative for cough and wheezing.    Cardiovascular:  Positive for leg swelling. Negative for chest pain and palpitations.   Musculoskeletal:  Positive for arthralgias and gait problem (uses walker).   All other systems reviewed and are negative.  Objective:     Vital Signs (Most Recent):  Temp: 98.4 °F (36.9 °C) (23 1458)  Pulse: 61 (23 1840)  Resp: (!) 29 (23 1840)  BP: (!) 142/63 (23 1800)  SpO2: 99 % (23) Vital Signs (24h Range):  Temp:  [98.4 °F (36.9 °C)-98.7 °F (37.1 °C)] 98.4 °F (36.9 °C)  Pulse:  [60-89] 61  Resp:  [23-29] 29  SpO2:  [97 %-100 %] 99 %  BP: (126-163)/(63-75) 142/63     Weight: 101.8 kg (224 lb 6.9 oz)  Body mass index is 32.2 kg/m².     Physical Exam  Vitals and nursing note reviewed.   Constitutional:       General: He is awake. He is not in acute distress.     Appearance: Normal appearance. He is well-developed and well-groomed. He is not ill-appearing, toxic-appearing or diaphoretic.      Comments:   Elderly male resting comfortably in stretcher in no acute distress   HENT:      Head: Normocephalic and atraumatic.   Eyes:      Extraocular Movements: Extraocular movements intact.      Conjunctiva/sclera: Conjunctivae normal.   Cardiovascular:      Rate and Rhythm: Normal rate and regular rhythm.      Pulses: Normal pulses.           Radial pulses are 2+ on the right side and 2+ on the left side.        Dorsalis pedis pulses are 2+ on the right side and 2+ on  the left side.      Heart sounds: Normal heart sounds. No murmur heard.  Pulmonary:      Effort: Pulmonary effort is normal.      Breath sounds: Decreased breath sounds and rales (scant rales in bilat lower lobes) present. No wheezing or rhonchi.   Abdominal:      General: Bowel sounds are normal.      Palpations: Abdomen is soft.      Tenderness: There is no abdominal tenderness.   Musculoskeletal:      Cervical back: Normal range of motion and neck supple.      Right lower le+ Edema present.      Left lower le+ Edema present.      Comments: 5/5 strength throughout   Skin:     General: Skin is warm and dry.      Capillary Refill: Capillary refill takes less than 2 seconds.   Neurological:      General: No focal deficit present.      Mental Status: He is alert and oriented to person, place, and time. Mental status is at baseline.      GCS: GCS eye subscore is 4. GCS verbal subscore is 5. GCS motor subscore is 6.      Cranial Nerves: Cranial nerves 2-12 are intact.      Sensory: Sensation is intact.      Motor: Motor function is intact.   Psychiatric:         Mood and Affect: Mood normal.         Behavior: Behavior normal. Behavior is cooperative.            LABS:  Recent Results (from the past 24 hour(s))   Urinalysis, Reflex to Urine Culture Urine, Clean Catch    Collection Time: 23  3:42 PM    Specimen: Urine   Result Value Ref Range    Specimen UA Urine, Clean Catch     Color, UA Yellow Yellow, Straw, Beth    Appearance, UA Clear Clear    pH, UA 5.0 5.0 - 8.0    Specific Gravity, UA 1.015 1.005 - 1.030    Protein, UA 1+ (A) Negative    Glucose, UA Negative Negative    Ketones, UA Negative Negative    Bilirubin (UA) Negative Negative    Occult Blood UA 3+ (A) Negative    Nitrite, UA Negative Negative    Urobilinogen, UA Negative <2.0 EU/dL    Leukocytes, UA Negative Negative   Urinalysis Microscopic    Collection Time: 23  3:42 PM   Result Value Ref Range    RBC, UA >100 (H) 0 - 4 /hpf    WBC,  UA 1 0 - 5 /hpf    Bacteria None None-Occ /hpf    Hyaline Casts, UA 19 (A) 0-1/lpf /lpf    Microscopic Comment SEE COMMENT    CBC auto differential    Collection Time: 06/08/23  3:51 PM   Result Value Ref Range    WBC 7.36 3.90 - 12.70 K/uL    RBC 3.22 (L) 4.60 - 6.20 M/uL    Hemoglobin 9.4 (L) 14.0 - 18.0 g/dL    Hematocrit 31.4 (L) 40.0 - 54.0 %    MCV 98 82 - 98 fL    MCH 29.2 27.0 - 31.0 pg    MCHC 29.9 (L) 32.0 - 36.0 g/dL    RDW 17.9 (H) 11.5 - 14.5 %    Platelets 233 150 - 450 K/uL    MPV 10.0 9.2 - 12.9 fL    Immature Granulocytes 0.3 0.0 - 0.5 %    Gran # (ANC) 5.7 1.8 - 7.7 K/uL    Immature Grans (Abs) 0.02 0.00 - 0.04 K/uL    Lymph # 0.6 (L) 1.0 - 4.8 K/uL    Mono # 0.9 0.3 - 1.0 K/uL    Eos # 0.1 0.0 - 0.5 K/uL    Baso # 0.03 0.00 - 0.20 K/uL    nRBC 0 0 /100 WBC    Gran % 76.7 (H) 38.0 - 73.0 %    Lymph % 8.6 (L) 18.0 - 48.0 %    Mono % 12.1 4.0 - 15.0 %    Eosinophil % 1.9 0.0 - 8.0 %    Basophil % 0.4 0.0 - 1.9 %    Differential Method Automated    Brain natriuretic peptide    Collection Time: 06/08/23  3:51 PM   Result Value Ref Range     (H) 0 - 99 pg/mL   Comprehensive metabolic panel    Collection Time: 06/08/23  3:51 PM   Result Value Ref Range    Sodium 144 136 - 145 mmol/L    Potassium 4.4 3.5 - 5.1 mmol/L    Chloride 116 (H) 95 - 110 mmol/L    CO2 18 (L) 23 - 29 mmol/L    Glucose 123 (H) 70 - 110 mg/dL    BUN 19 8 - 23 mg/dL    Creatinine 1.5 (H) 0.5 - 1.4 mg/dL    Calcium 9.1 8.7 - 10.5 mg/dL    Total Protein 7.6 6.0 - 8.4 g/dL    Albumin 3.4 (L) 3.5 - 5.2 g/dL    Total Bilirubin 0.4 0.1 - 1.0 mg/dL    Alkaline Phosphatase 101 55 - 135 U/L    AST 31 10 - 40 U/L    ALT 22 10 - 44 U/L    Anion Gap 10 8 - 16 mmol/L    eGFR 47 (A) >60 mL/min/1.73 m^2   Troponin I    Collection Time: 06/08/23  3:51 PM   Result Value Ref Range    Troponin I 0.032 (H) 0.000 - 0.026 ng/mL   Troponin I    Collection Time: 06/08/23  8:24 PM   Result Value Ref Range    Troponin I 0.040 (H) 0.000 - 0.026 ng/mL        RADIOLOGY  X-Ray Chest 1 View    Result Date: 6/8/2023  EXAMINATION: XR CHEST 1 VIEW CLINICAL HISTORY: Dyspnea, unspecified TECHNIQUE: Single frontal view of the chest was performed. COMPARISON: Chest radiograph 05/29/2023 FINDINGS: Cardiac pacer overlies the upper lateral left hemithorax with 3 leads appearing in similar position.  Spinal stimulator overlies the midline, appearing in similar position.  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  Mild haziness at the lung bases noted.  No pneumothorax. The cardiac silhouette is enlarged, similar to the comparison exam.  There is aortic calcification. No acute osseous abnormality.     Cardiomegaly with small bilateral pleural effusions.  Increased hazy bibasilar opacity which may correlate with layering pleural fluid versus infiltrate or edema. Electronically signed by: Dodie Richardson Date:    06/08/2023 Time:    16:20    X-Ray Chest 1 View    Result Date: 5/14/2023  EXAMINATION: XR CHEST AP PORTABLE CLINICAL HISTORY: r/o pulmonary edema; COMPARISON: Prior radiograph from 05/10/2023. FINDINGS: Prior median sternotomy with a left-sided AICD/pacer device in place.  Low lung volumes.  Small pleural fluid on the right.  Pulmonary vascular congestion.  This has increased in the interval.  No pneumothorax.  The cardiopericardial silhouette remains enlarged with calcification of the aorta.  No acute osseous findings.     Findings of mild pulmonary edema with a small right-sided pleural effusion. Electronically signed by: Carlos Flowers Jr., MD Date:    05/14/2023 Time:    12:27    X-Ray Knee 3 View Left    Result Date: 5/10/2023  EXAMINATION: XR KNEE 3 VIEW LEFT CLINICAL HISTORY: Pain in left knee TECHNIQUE: AP, lateral, and Merchant views of the left knee were performed. COMPARISON: None FINDINGS: No acute fracture or dislocation.   Postsurgical changes around the medial aspect of the knee atherosclerotic changes.  No acute fracture or dislocation.  Minimal chondrocalcinosis.  Suspect minimal degenerative joint disease.     No acute fracture or dislocation. Electronically signed by: Jack Suresh Date:    05/10/2023 Time:    18:05    CT Head Without Contrast    Result Date: 5/10/2023  EXAMINATION: CT HEAD WITHOUT CONTRAST CLINICAL HISTORY: Head trauma, minor (Age >= 65y); TECHNIQUE: Low dose axial CT images obtained throughout the head without intravenous contrast. Sagittal and coronal reconstructions were performed. COMPARISON: None. FINDINGS: Atrophy and chronic white matter changes Ventricles and sulci are normal in size for age without evidence of hydrocephalus. No extra-axial blood or fluid collections. No parenchymal mass, hemorrhage, edema or major vascular distribution infarct. Skull/extracranial contents (limited evaluation): No fracture. Mastoid air cells and paranasal sinuses are essentially clear.     No acute abnormality. Atrophy and chronic white matter changes All CT scans   are performed using dose optimization techniques including the following: automated exposure control; adjustment of the mA and/or kV; use of iterative reconstruction technique.  Dose modulation was employed for ALARA by means of: Automated exposure control; adjustment of the mA and/or kV according to patient size (this includes techniques or standardized protocols for targeted exams where dose is matched to indication/reason for exam; i.e. extremities or head); and/or use of iterative reconstructive technique. Electronically signed by: Jack Suresh Date:    05/10/2023 Time:    18:25    CT Lumbar Spine Without Contrast    Result Date: 5/10/2023  EXAMINATION: CT LUMBAR SPINE WITHOUT CONTRAST CLINICAL HISTORY: Low back pain, trauma; TECHNIQUE: CT lumbar spine without COMPARISON: None FINDINGS: No vertebral body compression deformity.  Spinal stimulator device  noted.  Moderate severe multilevel degenerative disc disease.  Fusion of L3 and L2 with suggestion of prior posterior fixation.  Laminectomy at L4-L5.  Atherosclerotic changes of the aorta iliac vasculature.  No acute fracture or dislocation.  Decreased bone mineral density.  Mild posterior subcutaneous fat stranding along the postoperative bed.     No acute fracture or dislocation Extensive degenerative joint disease Atherosclerotic disease Spinal stimulator device.  Correlate clinically Prior postsurgical changes including laminectomy at L4-L5 and prior posterior fixation at L2-L3.  Correlate to surgical history. All CT scans at this facility are performed  using dose modulation techniques as appropriate to performed exam including the following:  automated exposure control; adjustment of mA and/or kV according to the patients size (this includes techniques or standardized protocols for targeted exams where dose is matched to indication/reason for exam: i.e. extremities or head);  iterative reconstruction technique. Electronically signed by: Jack Suresh Date:    05/10/2023 Time:    22:47    X-Ray Chest AP Portable    Result Date: 5/29/2023  EXAMINATION: XR CHEST AP PORTABLE CLINICAL HISTORY: weakness; COMPARISON: May 14, 2023, as well as studies dating back to May 11, 2021 FINDINGS: The study is lordotic in position.  Stable scarring or chronic atelectasis in the lung bases.  The lungs are free of new pulmonary opacities.  The cardiac silhouette size is enlarged.  The trachea is midline and the mediastinal width is normal. Negative for focal infiltrate, effusion or pneumothorax. Pulmonary vasculature is normal. Negative for osseous abnormalities. Three lead left subclavian pacemaker again seen.  Stable lower thoracic spine stimulator, tortuous aorta, aortic arch calcifications, median sternotomy wires, CABG changes, coronary artery stents and cardiophrenic fat pads.     1.  Negative for acute process involving  the chest. 2.  Stable findings as noted above. Electronically signed by: José Antonio Jason MD Date:    05/29/2023 Time:    14:28    X-Ray Chest AP Portable    Result Date: 5/10/2023  EXAMINATION: XR CHEST AP PORTABLE CLINICAL HISTORY: Hypotension, unspecified TECHNIQUE: Single frontal view of the chest was performed. COMPARISON: None FINDINGS: Left chest biventricular pacing.  Improved perihilar bronchovascular crowding compared to prior exam. Cardiomegaly the hilar and mediastinal contours are unremarkable. Bones are intact..  Senescent changes.  Mild basilar atelectasis.     As above Electronically signed by: Jack Suresh Date:    05/10/2023 Time:    18:02    Echo    Result Date: 5/11/2023  · Eccentric hypertrophy and low normal systolic function. · Moderate left atrial enlargement. · The estimated ejection fraction is 50%. · Indeterminate left ventricular diastolic function. · There is abnormal septal wall motion consistent with left bundle branch block. · Normal right ventricular size with normal right ventricular systolic function. · Mild-to-moderate mitral regurgitation. · Mild to moderate tricuspid regurgitation.      CT Knee Without Contrast Left    Result Date: 5/12/2023  EXAMINATION: CT KNEE WITHOUT CONTRAST LEFT CLINICAL HISTORY: Left knee pain, stress fracture suspected, neg xray; FINDINGS: There is generalized osteopenia.  No fracture or other acute osseous abnormality identified.  No osteochondral defects or erosive change.  Normal joint alignment.  The joint spaces appear well preserved.  Chondrocalcinosis is noted with calcium deposition of the cruciate ligaments and menisci as well.  There is a small to moderate joint effusion.  No Baker's cyst or other periarticular fluid collections identified.  The regional musculature and tendons are within normal limits.  Extensive arterial calcifications are noted.  Surgical clips in the popliteal fossa and calf are noted.     No evidence of fracture or other  acute osseous abnormality.  Small to moderate joint effusion.  Chondrocalcinosis. All CT scans at this facility are performed  using dose modulation techniques as appropriate to performed exam including the following:  automated exposure control; adjustment of mA and/or kV according to the patients size (this includes techniques or standardized protocols for targeted exams where dose is matched to indication/reason for exam: i.e. extremities or head);  iterative reconstruction technique. Electronically signed by: Tahir Borja MD Date:    05/12/2023 Time:    14:16    CT Hip Without Contrast Left    Result Date: 5/10/2023  EXAMINATION: CT HIP WITHOUT CONTRAST LEFT CLINICAL HISTORY: Hip trauma, fracture suspected, no prior imaging; TECHNIQUE: CT of the left hip with sagittal and coronal reformations without contrast. COMPARISON: Prior radiograph FINDINGS: No acute fracture or dislocation.  Degenerative joint disease of the hip and to a less extent femoroacetabular joint.  Superior pubic rami inferior pubic rami are intact.  Pubic symphysis demonstrates degenerative joint disease.  Fat containing anterior muscular bundle.     No evidence for hip fracture or dislocation.  Senescent changes All CT scans   are performed using dose optimization techniques including the following: automated exposure control; adjustment of the mA and/or kV; use of iterative reconstruction technique.  Dose modulation was employed for ALARA by means of: Automated exposure control; adjustment of the mA and/or kV according to patient size (this includes techniques or standardized protocols for targeted exams where dose is matched to indication/reason for exam; i.e. extremities or head); and/or use of iterative reconstructive technique. Electronically signed by: Jack Suresh Date:    05/10/2023 Time:    22:44      EKG    MICROBIOLOGY    MDM     Amount and/or Complexity of Data Reviewed  Clinical lab tests: reviewed  Tests in the radiology  section of CPT®: reviewed  Tests in the medicine section of CPT®: reviewed  Discussion of test results with the performing providers: yes  Decide to obtain previous medical records or to obtain history from someone other than the patient: yes  Obtain history from someone other than the patient: yes  Review and summarize past medical records: yes  Discuss the patient with other providers: yes  Independent visualization of images, tracings, or specimens: yes          Assessment/Plan:     * Acute on chronic combined systolic and diastolic congestive heart failure  Patient is identified as having Combined Systolic and Diastolic heart failure that is Acute on chronic. CHF is currently uncontrolled due to Continued edema of extremities, >3 pillow orthopnea, Rales/crackles on pulmonary exam and Pulmonary edema/pleural effusion on CXR. Latest ECHO performed and demonstrates- Results for orders placed during the hospital encounter of 05/10/23    Echo    Interpretation Summary  · Eccentric hypertrophy and low normal systolic function.  · Moderate left atrial enlargement.  · The estimated ejection fraction is 50%.  · Indeterminate left ventricular diastolic function.  · There is abnormal septal wall motion consistent with left bundle branch block.  · Normal right ventricular size with normal right ventricular systolic function.  · Mild-to-moderate mitral regurgitation.  · Mild to moderate tricuspid regurgitation.  . Continue ACE/ARB and Furosemide and monitor clinical status closely. Monitor on telemetry. Patient is off CHF pathway.  Monitor strict Is&Os and daily weights.  Place on fluid restriction of 1.5 L. Continue to stress to patient importance of self efficacy and  on diet for CHF. Last BNP reviewed- and noted below   Recent Labs   Lab 06/08/23  1551   *     Reports little diuresing from home Lasix dosing  20 mg q.d. received 40 mg Lasix IV in ED. Monitor overnight for urinary output.  May benefit in  changing diuretic to Bumex to achieve desired diuretic effect.      Elevated troponin  Troponin elevated to 0.032.  Elevation likely secondary to ischemic demand from acute CHF exacerbation.   Plan:  - tele monitoring   -trend troponin   - repeat EKG p.r.n.   - nitro p.r.n.   - analgesics p.r.n.   - cards consult in a.m.      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  Appears near baseline without evidence of active bleeding noted.   Recent Labs   Lab 06/08/23  1551   HGB 9.4*   HCT 31.4*   MCV 98   MCHC 29.9*   RDW 17.9*        Plan:  -Monitor H/H and plts  -Type and screen, transfuse as needed   -Continue home medications   -Hold antiplatelets/anticoagulants in setting of active bleeding/pending surgical intervention         Hypertension  Currently normotensive. BP usually well controlled per patient with home medications.  Plan:  -Optimize pain control   -Continue home medications ( Lasix and losartan), titrate as needed   -Monitor BP  -Low salt/cardiac diet when not NPO  -IV hydralazine prn for SBP>160 or DBP>90         Hyperlipemia  Patient is chronically on statin.will continue for now. Last Lipid Panel:   Lab Results   Component Value Date    CHOL 124 07/14/2022    HDL 43 07/14/2022    LDLCALC 54.4 (L) 07/14/2022    TRIG 133 07/14/2022    CHOLHDL 34.7 07/14/2022     Plan:  -Continue home medication  -low fat/low calorie diet      MI, old    S/P CABG x 3    CAD (coronary artery disease)  Patient with known CAD s/p CABG, which is controlled Will continue Eliquis and Statin and monitor for S/Sx of angina/ACS. Continue to monitor on telemetry.       Atrial fibrillation with chronic anticoagulation with Eliquis  Patient with Paroxysmal (<7 days) atrial fibrillation which is controlled currently with Pacemaker. Patient is currently in  Ventricular paced rhythm.[id number] Score: 4. HASBLED Score: . Anticoagulation indicated. Anticoagulation done with Eliquis.      Chronic restrictive lung disease  Patient with  history of  Chronic restrictive lung disease not currently on inhalers or home oxygen not presently in acute exacerbation and is without signs/symptoms of distress.   Patient currently saturating 100 % on room air.  Plan:  -Continue home medications, titrate as needed  -Titrate oxygen requirements as needed  -Incentive spirometry   -Monitor pulse oximetry  -Joan prn    Obstructive sleep apnea  Awaiting approval for CPAP machine.  Not currently wearing any supplemental oxygen during the day or while sleeping at night.  In no acute respiratory distress.  Satting 100% on room air.    Plan:  - CPAP q.h.s. p.r.n.        VTE Risk Mitigation (From admission, onward)         Ordered     apixaban tablet 2.5 mg  2 times daily         06/08/23 2010     Reason for No Pharmacological VTE Prophylaxis  Once        Question:  Reasons:  Answer:  Already adequately anticoagulated on oral Anticoagulants    06/08/23 1959     IP VTE HIGH RISK PATIENT  Once         06/08/23 1959     Place sequential compression device  Until discontinued         06/08/23 1959              //Core Measures   -DVT proph: SCDs, currently on eliquis, will continue   -Code status Full    -Surrogate:spouse      Components of this note were documented using a voice recognition system and are subject to errors not corrected at the time the document was proof read. Please contact the author for any clarifications.       Danilo Britton NP  Department of Hospital Medicine  [facility] - Emergency Dept.

## 2023-06-10 NOTE — PROGRESS NOTES
[facility]  Progress Note    Patient Name: Jake Ortiz  MRN: [id number]  Patient Class: IP- Inpatient   Admission Date: 6/8/2023  Length of Stay: 1 days  Attending Physician: Priyanka Mazariegos MD  Primary Care Provider: Byron Stevens MD        Subjective:     Principal Problem:Acute on chronic combined systolic and diastolic congestive heart failure        HPI:  Jake Ortiz is a 82 y.o. 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 Presented to the ED for further evaluation at the request of his cardiologist, (Dr. Hood) for IV diuresis for CHF exacerbation.  Patient reports he was just discharged from skilled nursing facility 2 days ago on 06/06/2023 after being there for roughly 1 month for generalized weakness secondary to dehydration from over diuresing and UTI.  Patient states that he still feeling generally weak and feeling more short of breath and has developed worsening bilateral lower extremity edema over the last couple of days.  Patient states that he was initially on 20 mg Lasix b.I.d., but that dosing was cut in half due to VAIBHAV which resulted in the patient being hospitalized and eventually being sent to a skilled nursing facility.  Patient states that he has been compliant with his 20 mg Lasix q.d., but states he has not been urinating and has had worsening shortness of breath and fluid retention to his bilateral lower extremities.  Patient continues to ambulate with walker and states that he gets short-winded after walking a few feet.  Patient denies use of  oxygen at home nor does he use a CPAP machine even though upon chart review showed that patient is supposed to wear CPAP q.h.s..  Patient states that he was awaiting for the machine to be approved/delivered.  Otherwise, patient denies any fevers, chills, sweats, chest pain, palpitations, dysuria, or any other symptoms at this time.    ER workup revealed H/H of 9.4/31.4 (stable with history of anemia), BUN/creatinine of 19/1.5 with EGFR 47 (previously 46/2.2 with EGFR 30 on 05/29/2023), BNP of 415, troponin of 0.032, and UA revealing >100 RBC, +3 occult blood, +1 protein.  Chest x-ray revealed cardiomegaly with small bilateral pleural effusions correlating with underlying pleural fluid versus infiltrate vs edema.  EKG revealed ventricular paced rhythm at a rate of 65 beats per minute with a QT/QTC of 432/449.  Hospital Medicine consulted to admit patient for CHF exacerbation.  Patient and wife at bedside are in agreement with treatment plan.  Patient will be placed under observation status.    PCP: Byron Stevens        Overview/Hospital Course:  Patient admitted to observation for acute on chronic combined systolic and diastolic congestive heart failure.  Imaging supports Cardiomegaly with small bilateral pleural effusions with increased hazy bibasilar opacity which may correlate with layering pleural fluid versus infiltrate or edema.  IV Lasix initiated.  Previous echo results from 05/11/2023 reviewed.  Cardiology consulted.  Troponin elevated and flat secondary to volume strain. Of note, patient Rupert recently discharged from [facility].  Current plan of care continued as previously prescribed. On 6/10/23, edema to BLE improved with pitting edema to ankles/feet and communicative dyspnea noted upon assessment. Case discussed with Cardiology and Lasix dose increased. Creatinine and H/H stable.       Interval History: pt sitting up in chair upon exam and reports symptom improvement and  edema to BLE. Communicative dyspnea and 1+ pitting edema to BLE noted upon exam. Lasix dose increased with Creatinine stable at 1.5. Cardiology following.     Review of Systems   Constitutional:  Positive for fatigue. Negative for chills, diaphoresis and fever.   HENT:  Negative for congestion, postnasal drip, sinus pressure and trouble swallowing.    Respiratory:  Positive for shortness of breath. Negative for cough and wheezing.    Cardiovascular:  Positive for leg swelling. Negative for chest pain and palpitations.   Gastrointestinal:  Negative for abdominal distention, abdominal pain, diarrhea and vomiting.   Genitourinary:  Negative for difficulty urinating, flank pain, frequency and urgency.   Musculoskeletal:  Positive for arthralgias and gait problem (uses walker).   Skin:  Negative for wound.   Neurological:  Negative for dizziness and weakness.   Psychiatric/Behavioral:  Negative for agitation and confusion. The patient is not nervous/anxious.    All other systems reviewed and are negative.  Objective:     Vital Signs (Most Recent):  Temp: 98.2 °F (36.8 °C) (06/10/23 0725)  Pulse: 61 (06/10/23 0725)  Resp: 18 (06/10/23 0725)  BP: (!) 143/67 (06/10/23 0725)  SpO2: 96 % (06/10/23 0725) Vital Signs (24h Range):  Temp:  [97.2 °F (36.2 °C)-98.2 °F (36.8 °C)] 98.2 °F (36.8 °C)  Pulse:  [60-66] 61  Resp:  [16-18] 18  SpO2:  [96 %-97 %] 96 %  BP: (138-146)/(67-72) 143/67     Weight: 101.8 kg (224 lb 6.9 oz)  Body mass index is 32.2 kg/m².    Intake/Output Summary (Last 24 hours) at 6/10/2023 1159  Last data filed at 6/9/2023 1815  Gross per 24 hour   Intake 360 ml   Output --   Net 360 ml         Physical Exam  Vitals and nursing note reviewed.   Constitutional:       General: He is awake. He is not in acute distress.     Appearance: Normal appearance. He is well-developed and well-groomed. He is not ill-appearing, toxic-appearing or diaphoretic.      Comments:   Elderly male resting comfortably in stretcher in no  acute distress   HENT:      Head: Normocephalic and atraumatic.   Eyes:      Extraocular Movements: Extraocular movements intact.      Conjunctiva/sclera: Conjunctivae normal.   Cardiovascular:      Rate and Rhythm: Normal rate and regular rhythm.      Pulses: Normal pulses.           Radial pulses are 2+ on the right side and 2+ on the left side.        Dorsalis pedis pulses are 2+ on the right side and 2+ on the left side.      Heart sounds: Normal heart sounds. No murmur heard.  Pulmonary:      Effort: Pulmonary effort is normal.      Breath sounds: Decreased breath sounds and rales (scant rales in bilat lower lobes) present. No wheezing or rhonchi.   Abdominal:      General: Bowel sounds are normal.      Palpations: Abdomen is soft.      Tenderness: There is no abdominal tenderness.   Genitourinary:     Comments: Deferred   Musculoskeletal:         General: Swelling present.      Cervical back: Normal range of motion and neck supple.      Right lower le+ Edema present.      Left lower le+ Edema present.      Comments: 5/5 strength throughout   Skin:     General: Skin is warm and dry.      Capillary Refill: Capillary refill takes less than 2 seconds.   Neurological:      General: No focal deficit present.      Mental Status: He is alert and oriented to person, place, and time. Mental status is at baseline.      GCS: GCS eye subscore is 4. GCS verbal subscore is 5. GCS motor subscore is 6.      Cranial Nerves: Cranial nerves 2-12 are intact.      Sensory: Sensation is intact.      Motor: Motor function is intact.   Psychiatric:         Mood and Affect: Mood normal.         Behavior: Behavior normal. Behavior is cooperative.           Significant Labs: All pertinent labs within the past 24 hours have been reviewed.  CBC:   Recent Labs   Lab 23  1551 23  0542 06/10/23  0522   WBC 7.36 5.03 5.18   HGB 9.4* 8.3* 8.6*   HCT 31.4* 28.1* 28.7*    202 222     CMP:   Recent Labs   Lab  06/08/23  1551 06/09/23  0541 06/10/23  0522    143 143   K 4.4 4.4 4.8   * 116* 115*   CO2 18* 17* 19*   * 113* 109   BUN 19 18 18   CREATININE 1.5* 1.4 1.5*   CALCIUM 9.1 8.8 8.9   PROT 7.6  --   --    ALBUMIN 3.4*  --   --    BILITOT 0.4  --   --    ALKPHOS 101  --   --    AST 31  --   --    ALT 22  --   --    ANIONGAP 10 10 9     POCT Glucose: No results for input(s): POCTGLUCOSE in the last 48 hours.  Troponin:   Recent Labs   Lab 06/08/23  1551 06/08/23 2024   TROPONINI 0.032* 0.040*       Significant Imaging: I have reviewed all pertinent imaging results/findings within the past 24 hours.      Assessment/Plan:      * Acute on chronic combined systolic and diastolic congestive heart failure  Patient is identified as having Combined Systolic and Diastolic heart failure that is Acute on chronic. CHF is currently uncontrolled due to Continued edema of extremities, >3 pillow orthopnea, Rales/crackles on pulmonary exam and Pulmonary edema/pleural effusion on CXR. Latest ECHO performed and demonstrates- Results for orders placed during the hospital encounter of 05/10/23    Echo    Interpretation Summary  · Eccentric hypertrophy and low normal systolic function.  · Moderate left atrial enlargement.  · The estimated ejection fraction is 50%.  · Indeterminate left ventricular diastolic function.  · There is abnormal septal wall motion consistent with left bundle branch block.  · Normal right ventricular size with normal right ventricular systolic function.  · Mild-to-moderate mitral regurgitation.  · Mild to moderate tricuspid regurgitation.  . Continue ACE/ARB and Furosemide and monitor clinical status closely. Monitor on telemetry. Patient is off CHF pathway.  Monitor strict Is&Os and daily weights.  Place on fluid restriction of 1.5 L. Continue to stress to patient importance of self efficacy and  on diet for CHF. Last BNP reviewed- and noted below   Recent Labs   Lab 06/08/23  1551   BNP  415*     Reports little diuresing from home Lasix dosing  20 mg q.d. received 40 mg Lasix IV in ED. Monitor overnight for urinary output.  May benefit in changing diuretic to Bumex to achieve desired diuretic effect.  -6/9/23- diuresis continued- Cardiology following- reports recent consumption of sodium rich foods- compliance with low sodium diet and fluid restriction encouraged   -6/10/23- diuresis in progress- IV Lasix dose increased to BID- Cardiology following       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  Appears near baseline without evidence of active bleeding noted.   Recent Labs   Lab 06/08/23  1551 06/09/23  0542 06/10/23  0522   HGB 9.4* 8.3* 8.6*   HCT 31.4* 28.1* 28.7*   MCV 98 97 97   MCHC 29.9* 29.5* 30.0*   RDW 17.9* 17.8* 17.7*    202 222     Plan:  -Monitor H/H and plts  -Type and screen, transfuse as needed   -Continue home medications   -Hold antiplatelets/anticoagulants in setting of active bleeding/pending surgical intervention           Elevated troponin  Troponin elevated to 0.032.  Elevation likely secondary to ischemic demand from acute CHF exacerbation.   Plan:  - tele monitoring   -trend troponin- elevated and flat  - repeat EKG p.r.n.   - nitro p.r.n.   - analgesics p.r.n.   - cards consulted   -diurese in progress      Obstructive sleep apnea  Awaiting approval for CPAP machine.  Not currently wearing any supplemental oxygen during the day or while sleeping at night.  In no acute respiratory distress.  Satting 100% on room air.    Plan:  - CPAP q.h.s. p.r.n.      Chronic restrictive lung disease  Patient with history of  Chronic restrictive lung disease not currently on inhalers or home oxygen not presently in acute exacerbation and is without signs/symptoms of distress.   Patient currently saturating 100 % on room air.  Plan:  -Continue home medications, titrate as needed  -Titrate oxygen requirements as needed  -Incentive spirometry   -Monitor pulse oximetry  -Joan  prn          Atrial fibrillation with chronic anticoagulation with Eliquis  Patient with Paroxysmal (<7 days) atrial fibrillation which is controlled currently with Pacemaker. Patient is currently in  Ventricular paced rhythm.[id number] Score: 4. HASBLED Score: . Anticoagulation indicated. Anticoagulation done with Eliquis.        S/P CABG x 3  -home medications resumed    MI, old  -home medications resumed      CAD (coronary artery disease)  Patient with known CAD s/p CABG, which is controlled Will continue Eliquis and Statin and monitor for S/Sx of angina/ACS. Continue to monitor on telemetry.         Hyperlipemia  Patient is chronically on statin.will continue for now. Last Lipid Panel:   Lab Results   Component Value Date    CHOL 124 07/14/2022    HDL 43 07/14/2022    LDLCALC 54.4 (L) 07/14/2022    TRIG 133 07/14/2022    CHOLHDL 34.7 07/14/2022     Plan:  -Continue home medication  -low fat/low calorie diet        Hypertension  Currently normotensive. BP usually well controlled per patient with home medications.  Plan:  -Optimize pain control   -Continue home medications ( Lasix and losartan), titrate as needed   -Monitor BP  -Low salt/cardiac diet  -IV hydralazine prn for SBP>160 or DBP>90             VTE Risk Mitigation (From admission, onward)         Ordered     apixaban tablet 2.5 mg  2 times daily         06/08/23 2010     Reason for No Pharmacological VTE Prophylaxis  Once        Question:  Reasons:  Answer:  Already adequately anticoagulated on oral Anticoagulants    06/08/23 1959     IP VTE HIGH RISK PATIENT  Once         06/08/23 1959     Place sequential compression device  Until discontinued         06/08/23 1959                Discharge Planning   LITTLE:      Code Status: Full Code   Is the patient medically ready for discharge?:     Reason for patient still in hospital (select all that apply): Patient trending condition, Laboratory test, Treatment and Consult recommendations  Discharge Plan A: Home  Health                  Marybeth Damon NP  Department of Hospital Medicine   [facility]

## 2023-06-10 NOTE — SUBJECTIVE & OBJECTIVE
Interval History: improved    Review of Systems   Constitutional: Positive for malaise/fatigue. Negative for chills, diaphoresis, night sweats, weight gain and weight loss.   HENT:  Negative for congestion, hoarse voice, sore throat and stridor.    Eyes:  Negative for double vision and pain.   Cardiovascular:  Negative for chest pain, claudication, cyanosis, dyspnea on exertion, irregular heartbeat, leg swelling, near-syncope, orthopnea, palpitations, paroxysmal nocturnal dyspnea and syncope.   Respiratory:  Negative for cough, hemoptysis, shortness of breath, sleep disturbances due to breathing, snoring, sputum production and wheezing.    Endocrine: Negative for cold intolerance, heat intolerance and polydipsia.   Hematologic/Lymphatic: Negative for bleeding problem. Does not bruise/bleed easily.   Skin:  Negative for color change, dry skin and rash.   Musculoskeletal:  Negative for joint swelling and muscle cramps.   Gastrointestinal:  Negative for bloating, abdominal pain, constipation, diarrhea, dysphagia, melena, nausea and vomiting.   Genitourinary:  Negative for flank pain and urgency.   Neurological:  Negative for dizziness, focal weakness, headaches, light-headedness, loss of balance, seizures and weakness.   Psychiatric/Behavioral:  Negative for altered mental status and memory loss. The patient is not nervous/anxious.    Objective:     Vital Signs (Most Recent):  Temp: 98.2 °F (36.8 °C) (06/10/23 0725)  Pulse: 61 (06/10/23 0725)  Resp: 18 (06/10/23 0725)  BP: (!) 143/67 (06/10/23 0725)  SpO2: 96 % (06/10/23 0725) Vital Signs (24h Range):  Temp:  [97.2 °F (36.2 °C)-98.2 °F (36.8 °C)] 98.2 °F (36.8 °C)  Pulse:  [60-66] 61  Resp:  [16-18] 18  SpO2:  [96 %-97 %] 96 %  BP: (138-146)/(67-72) 143/67     Weight: 101.8 kg (224 lb 6.9 oz)  Body mass index is 32.2 kg/m².     SpO2: 96 %         Intake/Output Summary (Last 24 hours) at 6/10/2023 1156  Last data filed at 6/9/2023 1815  Gross per 24 hour   Intake 360 ml    Output --   Net 360 ml       Lines/Drains/Airways       Peripheral Intravenous Line  Duration                  Peripheral IV - Single Lumen 06/08/23 1834 20 G Left Antecubital 1 day                       Physical Exam  Eyes:      Pupils: Pupils are equal, round, and reactive to light.   Neck:      Trachea: No tracheal deviation.   Cardiovascular:      Rate and Rhythm: Normal rate and regular rhythm.      Pulses: Intact distal pulses.           Carotid pulses are 2+ on the right side and 2+ on the left side.       Radial pulses are 2+ on the right side and 2+ on the left side.        Femoral pulses are 2+ on the right side and 2+ on the left side.       Popliteal pulses are 2+ on the right side and 2+ on the left side.        Dorsalis pedis pulses are 2+ on the right side and 2+ on the left side.        Posterior tibial pulses are 2+ on the right side and 2+ on the left side.      Heart sounds: Normal heart sounds. No murmur heard.    No friction rub. No gallop.   Pulmonary:      Effort: Pulmonary effort is normal. No respiratory distress.      Breath sounds: Normal breath sounds. No stridor. No wheezing or rales.   Chest:      Chest wall: No tenderness.   Abdominal:      General: There is no distension.      Tenderness: There is no abdominal tenderness. There is no rebound.   Musculoskeletal:         General: No tenderness.      Cervical back: Normal range of motion.      Right lower leg: Edema present.      Left lower leg: Edema present.   Skin:     General: Skin is warm and dry.   Neurological:      Mental Status: He is alert and oriented to person, place, and time.          Significant Labs: CMP   Recent Labs   Lab 06/08/23  1551 06/09/23  0541 06/10/23  0522    143 143   K 4.4 4.4 4.8   * 116* 115*   CO2 18* 17* 19*   * 113* 109   BUN 19 18 18   CREATININE 1.5* 1.4 1.5*   CALCIUM 9.1 8.8 8.9   PROT 7.6  --   --    ALBUMIN 3.4*  --   --    BILITOT 0.4  --   --    ALKPHOS 101  --   --    AST  31  --   --    ALT 22  --   --    ANIONGAP 10 10 9       Significant Imaging: Echocardiogram: Transthoracic echo (TTE) complete (Cupid Only):   Results for orders placed or performed during the hospital encounter of 05/10/23   Echo   Result Value Ref Range    BSA 2.14 m2    LA WIDTH 4.20 cm    IVC diameter 2.87 cm    Left Ventricular Outflow Tract Mean Velocity 0.56 cm/s    Left Ventricular Outflow Tract Mean Gradient 1.55 mmHg    LVIDd 6.51 (A) 3.5 - 6.0 cm    IVS 0.96 0.6 - 1.1 cm    Posterior Wall 0.85 0.6 - 1.1 cm    Ao root annulus 3.83 cm    LVIDs 4.59 (A) 2.1 - 4.0 cm    FS 29 28 - 44 %    LA volume 132.20 cm3    STJ 3.46 cm    Ascending aorta 3.17 cm    LV mass 250.23 g    LA size 5.31 cm    RVDD 4.24 cm    TAPSE 1.90 cm    Left Ventricle Relative Wall Thickness 0.26 cm    AV mean gradient 6 mmHg    AV valve area 1.83 cm2    AV Velocity Ratio 0.58     AV index (prosthetic) 0.57     MV valve area p 1/2 method 2.75 cm2    E/A ratio 3.60     E wave deceleration time 276.19 msec    IVRT 51.38 msec    LVOT diameter 2.02 cm    LVOT area 3.2 cm2    LVOT peak wilian 0.91 m/s    LVOT peak VTI 16.70 cm    Ao peak wilian 1.57 m/s    Ao VTI 29.3 cm    RVOT peak wilian 0.77 m/s    RVOT peak VTI 14.5 cm    Mr max wilian 4.78 m/s    LVOT stroke volume 53.49 cm3    AV peak gradient 10 mmHg    PV mean gradient 1.03 mmHg    MV Peak E Wilian 1.08 m/s    TR Max Wilian 3.08 m/s    MV stenosis pressure 1/2 time 80.09 ms    MV Peak A Wilian 0.30 m/s    LV Systolic Volume 96.76 mL    LV Systolic Volume Index 46.3 mL/m2    LV Diastolic Volume 216.76 mL    LV Diastolic Volume Index 103.71 mL/m2    LA Volume Index 63.3 mL/m2    LV Mass Index 120 g/m2    RA Major Axis 7.76 cm    Left Atrium Minor Axis 6.88 cm    Left Atrium Major Axis 7.07 cm    Triscuspid Valve Regurgitation Peak Gradient 38 mmHg    EF 50 %    Narrative    · Eccentric hypertrophy and low normal systolic function.  · Moderate left atrial enlargement.  · The estimated ejection fraction is  50%.  · Indeterminate left ventricular diastolic function.  · There is abnormal septal wall motion consistent with left bundle branch   block.  · Normal right ventricular size with normal right ventricular systolic   function.  · Mild-to-moderate mitral regurgitation.  · Mild to moderate tricuspid regurgitation.

## 2023-06-10 NOTE — ASSESSMENT & PLAN NOTE
Patient with Paroxysmal (<7 days) atrial fibrillation which is controlled currently with Pacemaker. Patient is currently in  Ventricular paced rhythm.[id number] Score: 4. HASBLED Score: . Anticoagulation indicated. Anticoagulation done with Eliquis.

## 2023-06-10 NOTE — ASSESSMENT & PLAN NOTE
Patient is identified as having Combined Systolic and Diastolic heart failure that is Acute on chronic. CHF is currently uncontrolled due to Continued edema of extremities. Latest ECHO performed and demonstrates- Results for orders placed during the hospital encounter of 05/10/23    Echo    Interpretation Summary  · Eccentric hypertrophy and low normal systolic function.  · Moderate left atrial enlargement.  · The estimated ejection fraction is 50%.  · Indeterminate left ventricular diastolic function.  · There is abnormal septal wall motion consistent with left bundle branch block.  · Normal right ventricular size with normal right ventricular systolic function.  · Mild-to-moderate mitral regurgitation.  · Mild to moderate tricuspid regurgitation.  . Continue ACE/ARB and Furosemide and monitor clinical status closely. Monitor on telemetry. Patient is on CHF pathway.  Monitor strict Is&Os and daily weights.  Place on fluid restriction of 2 L. Continue to stress to patient importance of self efficacy and  on diet for CHF. Last BNP reviewed- and noted below   Recent Labs   Lab 06/08/23  1551   *   .  6/10/23-continue diuresis

## 2023-06-10 NOTE — PROGRESS NOTES
[facility]  Cardiology  Progress Note    Patient Name: Jake Ortiz  MRN: [id number]  Admission Date: 6/8/2023  Hospital Length of Stay: 1 days  Code Status: Full Code   Attending Physician: Priyanka Mazariegos MD   Primary Care Physician: Byron Stevens MD  Expected Discharge Date:   Principal Problem:Acute on chronic combined systolic and diastolic congestive heart failure    Subjective:     Hospital Course:   6/10/23-Patient seen and examined. Plan diuresis today.      Interval History: improved    Review of Systems   Constitutional: Positive for malaise/fatigue. Negative for chills, diaphoresis, night sweats, weight gain and weight loss.   HENT:  Negative for congestion, hoarse voice, sore throat and stridor.    Eyes:  Negative for double vision and pain.   Cardiovascular:  Negative for chest pain, claudication, cyanosis, dyspnea on exertion, irregular heartbeat, leg swelling, near-syncope, orthopnea, palpitations, paroxysmal nocturnal dyspnea and syncope.   Respiratory:  Negative for cough, hemoptysis, shortness of breath, sleep disturbances due to breathing, snoring, sputum production and wheezing.    Endocrine: Negative for cold intolerance, heat intolerance and polydipsia.   Hematologic/Lymphatic: Negative for bleeding problem. Does not bruise/bleed easily.   Skin:  Negative for color change, dry skin and rash.   Musculoskeletal:  Negative for joint swelling and muscle cramps.   Gastrointestinal:  Negative for bloating, abdominal pain, constipation, diarrhea, dysphagia, melena, nausea and vomiting.   Genitourinary:  Negative for flank pain and urgency.   Neurological:  Negative for dizziness, focal weakness, headaches, light-headedness, loss of balance, seizures and weakness.   Psychiatric/Behavioral:  Negative for altered mental status and memory loss. The patient is not nervous/anxious.    Objective:     Vital Signs (Most Recent):  Temp: 98.2 °F (36.8 °C) (06/10/23 0725)  Pulse: 61 (06/10/23  0725)  Resp: 18 (06/10/23 0725)  BP: (!) 143/67 (06/10/23 0725)  SpO2: 96 % (06/10/23 0725) Vital Signs (24h Range):  Temp:  [97.2 °F (36.2 °C)-98.2 °F (36.8 °C)] 98.2 °F (36.8 °C)  Pulse:  [60-66] 61  Resp:  [16-18] 18  SpO2:  [96 %-97 %] 96 %  BP: (138-146)/(67-72) 143/67     Weight: 101.8 kg (224 lb 6.9 oz)  Body mass index is 32.2 kg/m².     SpO2: 96 %         Intake/Output Summary (Last 24 hours) at 6/10/2023 1156  Last data filed at 6/9/2023 1815  Gross per 24 hour   Intake 360 ml   Output --   Net 360 ml       Lines/Drains/Airways       Peripheral Intravenous Line  Duration                  Peripheral IV - Single Lumen 06/08/23 1834 20 G Left Antecubital 1 day                       Physical Exam  Eyes:      Pupils: Pupils are equal, round, and reactive to light.   Neck:      Trachea: No tracheal deviation.   Cardiovascular:      Rate and Rhythm: Normal rate and regular rhythm.      Pulses: Intact distal pulses.           Carotid pulses are 2+ on the right side and 2+ on the left side.       Radial pulses are 2+ on the right side and 2+ on the left side.        Femoral pulses are 2+ on the right side and 2+ on the left side.       Popliteal pulses are 2+ on the right side and 2+ on the left side.        Dorsalis pedis pulses are 2+ on the right side and 2+ on the left side.        Posterior tibial pulses are 2+ on the right side and 2+ on the left side.      Heart sounds: Normal heart sounds. No murmur heard.    No friction rub. No gallop.   Pulmonary:      Effort: Pulmonary effort is normal. No respiratory distress.      Breath sounds: Normal breath sounds. No stridor. No wheezing or rales.   Chest:      Chest wall: No tenderness.   Abdominal:      General: There is no distension.      Tenderness: There is no abdominal tenderness. There is no rebound.   Musculoskeletal:         General: No tenderness.      Cervical back: Normal range of motion.      Right lower leg: Edema present.      Left lower leg: Edema  present.   Skin:     General: Skin is warm and dry.   Neurological:      Mental Status: He is alert and oriented to person, place, and time.          Significant Labs: CMP   Recent Labs   Lab 06/08/23  1551 06/09/23  0541 06/10/23  0522    143 143   K 4.4 4.4 4.8   * 116* 115*   CO2 18* 17* 19*   * 113* 109   BUN 19 18 18   CREATININE 1.5* 1.4 1.5*   CALCIUM 9.1 8.8 8.9   PROT 7.6  --   --    ALBUMIN 3.4*  --   --    BILITOT 0.4  --   --    ALKPHOS 101  --   --    AST 31  --   --    ALT 22  --   --    ANIONGAP 10 10 9       Significant Imaging: Echocardiogram: Transthoracic echo (TTE) complete (Cupid Only):   Results for orders placed or performed during the hospital encounter of 05/10/23   Echo   Result Value Ref Range    BSA 2.14 m2    LA WIDTH 4.20 cm    IVC diameter 2.87 cm    Left Ventricular Outflow Tract Mean Velocity 0.56 cm/s    Left Ventricular Outflow Tract Mean Gradient 1.55 mmHg    LVIDd 6.51 (A) 3.5 - 6.0 cm    IVS 0.96 0.6 - 1.1 cm    Posterior Wall 0.85 0.6 - 1.1 cm    Ao root annulus 3.83 cm    LVIDs 4.59 (A) 2.1 - 4.0 cm    FS 29 28 - 44 %    LA volume 132.20 cm3    STJ 3.46 cm    Ascending aorta 3.17 cm    LV mass 250.23 g    LA size 5.31 cm    RVDD 4.24 cm    TAPSE 1.90 cm    Left Ventricle Relative Wall Thickness 0.26 cm    AV mean gradient 6 mmHg    AV valve area 1.83 cm2    AV Velocity Ratio 0.58     AV index (prosthetic) 0.57     MV valve area p 1/2 method 2.75 cm2    E/A ratio 3.60     E wave deceleration time 276.19 msec    IVRT 51.38 msec    LVOT diameter 2.02 cm    LVOT area 3.2 cm2    LVOT peak wilian 0.91 m/s    LVOT peak VTI 16.70 cm    Ao peak wilian 1.57 m/s    Ao VTI 29.3 cm    RVOT peak wilian 0.77 m/s    RVOT peak VTI 14.5 cm    Mr max wilian 4.78 m/s    LVOT stroke volume 53.49 cm3    AV peak gradient 10 mmHg    PV mean gradient 1.03 mmHg    MV Peak E Wilian 1.08 m/s    TR Max Wilian 3.08 m/s    MV stenosis pressure 1/2 time 80.09 ms    MV Peak A Wilian 0.30 m/s    LV Systolic  Volume 96.76 mL    LV Systolic Volume Index 46.3 mL/m2    LV Diastolic Volume 216.76 mL    LV Diastolic Volume Index 103.71 mL/m2    LA Volume Index 63.3 mL/m2    LV Mass Index 120 g/m2    RA Major Axis 7.76 cm    Left Atrium Minor Axis 6.88 cm    Left Atrium Major Axis 7.07 cm    Triscuspid Valve Regurgitation Peak Gradient 38 mmHg    EF 50 %    Narrative    · Eccentric hypertrophy and low normal systolic function.  · Moderate left atrial enlargement.  · The estimated ejection fraction is 50%.  · Indeterminate left ventricular diastolic function.  · There is abnormal septal wall motion consistent with left bundle branch   block.  · Normal right ventricular size with normal right ventricular systolic   function.  · Mild-to-moderate mitral regurgitation.  · Mild to moderate tricuspid regurgitation.        Assessment and Plan:     Brief HPI: stable , continue diuresis today    * Acute on chronic combined systolic and diastolic congestive heart failure  Patient is identified as having Combined Systolic and Diastolic heart failure that is Acute on chronic. CHF is currently uncontrolled due to Continued edema of extremities. Latest ECHO performed and demonstrates- Results for orders placed during the hospital encounter of 05/10/23    Echo    Interpretation Summary  · Eccentric hypertrophy and low normal systolic function.  · Moderate left atrial enlargement.  · The estimated ejection fraction is 50%.  · Indeterminate left ventricular diastolic function.  · There is abnormal septal wall motion consistent with left bundle branch block.  · Normal right ventricular size with normal right ventricular systolic function.  · Mild-to-moderate mitral regurgitation.  · Mild to moderate tricuspid regurgitation.  . Continue ACE/ARB and Furosemide and monitor clinical status closely. Monitor on telemetry. Patient is on CHF pathway.  Monitor strict Is&Os and daily weights.  Place on fluid restriction of 2 L. Continue to stress to  patient importance of self efficacy and  on diet for CHF. Last BNP reviewed- and noted below   Recent Labs   Lab 06/08/23  1551   *   .  6/10/23-continue diuresis    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  Per hospital medicine    Elevated troponin  Demand ischemia, trend    Atrial fibrillation with chronic anticoagulation with Eliquis  Continue eliquis    S/P CABG x 3  Follow, no angina    CAD (coronary artery disease)  Stable no angina    Hyperlipemia  Continue statin    Hypertension  Continue losartan        VTE Risk Mitigation (From admission, onward)         Ordered     apixaban tablet 2.5 mg  2 times daily         06/08/23 2010     Reason for No Pharmacological VTE Prophylaxis  Once        Question:  Reasons:  Answer:  Already adequately anticoagulated on oral Anticoagulants    06/08/23 1959     IP VTE HIGH RISK PATIENT  Once         06/08/23 1959     Place sequential compression device  Until discontinued         06/08/23 1959                Jer Rushing MD  Cardiology  [facility]

## 2023-06-10 NOTE — ASSESSMENT & PLAN NOTE
Patient is identified as having Combined Systolic and Diastolic heart failure that is Acute on chronic. CHF is currently uncontrolled due to Continued edema of extremities, >3 pillow orthopnea, Rales/crackles on pulmonary exam and Pulmonary edema/pleural effusion on CXR. Latest ECHO performed and demonstrates- Results for orders placed during the hospital encounter of 05/10/23    Echo    Interpretation Summary  · Eccentric hypertrophy and low normal systolic function.  · Moderate left atrial enlargement.  · The estimated ejection fraction is 50%.  · Indeterminate left ventricular diastolic function.  · There is abnormal septal wall motion consistent with left bundle branch block.  · Normal right ventricular size with normal right ventricular systolic function.  · Mild-to-moderate mitral regurgitation.  · Mild to moderate tricuspid regurgitation.  . Continue ACE/ARB and Furosemide and monitor clinical status closely. Monitor on telemetry. Patient is off CHF pathway.  Monitor strict Is&Os and daily weights.  Place on fluid restriction of 1.5 L. Continue to stress to patient importance of self efficacy and  on diet for CHF. Last BNP reviewed- and noted below   Recent Labs   Lab 06/08/23  1551   *     Reports little diuresing from home Lasix dosing  20 mg q.d. received 40 mg Lasix IV in ED. Monitor overnight for urinary output.  May benefit in changing diuretic to Bumex to achieve desired diuretic effect.  -6/9/23- diuresis continued- Cardiology following- reports recent consumption of sodium rich foods- compliance with low sodium diet and fluid restriction encouraged   -6/10/23- diuresis in progress- IV Lasix dose increased to BID- Cardiology following

## 2023-06-10 NOTE — PLAN OF CARE
VSS. Fall precautions maintained.   No pain complaints.  NSR paced on cardiac monitor.  Ambulates in the hallway  Family at bedside.   All needs met. Will continue to monitor.

## 2023-06-10 NOTE — SUBJECTIVE & OBJECTIVE
Interval History: pt sitting up in chair upon exam and reports symptom improvement and edema to BLE. Communicative dyspnea and 1+ pitting edema to BLE noted upon exam. Lasix dose increased with Creatinine stable at 1.5. Cardiology following.     Review of Systems   Constitutional:  Positive for fatigue. Negative for chills, diaphoresis and fever.   HENT:  Negative for congestion, postnasal drip, sinus pressure and trouble swallowing.    Respiratory:  Positive for shortness of breath. Negative for cough and wheezing.    Cardiovascular:  Positive for leg swelling. Negative for chest pain and palpitations.   Gastrointestinal:  Negative for abdominal distention, abdominal pain, diarrhea and vomiting.   Genitourinary:  Negative for difficulty urinating, flank pain, frequency and urgency.   Musculoskeletal:  Positive for arthralgias and gait problem (uses walker).   Skin:  Negative for wound.   Neurological:  Negative for dizziness and weakness.   Psychiatric/Behavioral:  Negative for agitation and confusion. The patient is not nervous/anxious.    All other systems reviewed and are negative.  Objective:     Vital Signs (Most Recent):  Temp: 98.2 °F (36.8 °C) (06/10/23 0725)  Pulse: 61 (06/10/23 0725)  Resp: 18 (06/10/23 0725)  BP: (!) 143/67 (06/10/23 0725)  SpO2: 96 % (06/10/23 0725) Vital Signs (24h Range):  Temp:  [97.2 °F (36.2 °C)-98.2 °F (36.8 °C)] 98.2 °F (36.8 °C)  Pulse:  [60-66] 61  Resp:  [16-18] 18  SpO2:  [96 %-97 %] 96 %  BP: (138-146)/(67-72) 143/67     Weight: 101.8 kg (224 lb 6.9 oz)  Body mass index is 32.2 kg/m².    Intake/Output Summary (Last 24 hours) at 6/10/2023 1159  Last data filed at 6/9/2023 1815  Gross per 24 hour   Intake 360 ml   Output --   Net 360 ml         Physical Exam  Vitals and nursing note reviewed.   Constitutional:       General: He is awake. He is not in acute distress.     Appearance: Normal appearance. He is well-developed and well-groomed. He is not ill-appearing,  toxic-appearing or diaphoretic.      Comments:   Elderly male resting comfortably in stretcher in no acute distress   HENT:      Head: Normocephalic and atraumatic.   Eyes:      Extraocular Movements: Extraocular movements intact.      Conjunctiva/sclera: Conjunctivae normal.   Cardiovascular:      Rate and Rhythm: Normal rate and regular rhythm.      Pulses: Normal pulses.           Radial pulses are 2+ on the right side and 2+ on the left side.        Dorsalis pedis pulses are 2+ on the right side and 2+ on the left side.      Heart sounds: Normal heart sounds. No murmur heard.  Pulmonary:      Effort: Pulmonary effort is normal.      Breath sounds: Decreased breath sounds and rales (scant rales in bilat lower lobes) present. No wheezing or rhonchi.   Abdominal:      General: Bowel sounds are normal.      Palpations: Abdomen is soft.      Tenderness: There is no abdominal tenderness.   Genitourinary:     Comments: Deferred   Musculoskeletal:         General: Swelling present.      Cervical back: Normal range of motion and neck supple.      Right lower le+ Edema present.      Left lower le+ Edema present.      Comments: 5/5 strength throughout   Skin:     General: Skin is warm and dry.      Capillary Refill: Capillary refill takes less than 2 seconds.   Neurological:      General: No focal deficit present.      Mental Status: He is alert and oriented to person, place, and time. Mental status is at baseline.      GCS: GCS eye subscore is 4. GCS verbal subscore is 5. GCS motor subscore is 6.      Cranial Nerves: Cranial nerves 2-12 are intact.      Sensory: Sensation is intact.      Motor: Motor function is intact.   Psychiatric:         Mood and Affect: Mood normal.         Behavior: Behavior normal. Behavior is cooperative.           Significant Labs: All pertinent labs within the past 24 hours have been reviewed.  CBC:   Recent Labs   Lab 23  1551 23  0542 06/10/23  0522   WBC 7.36 5.03 5.18    HGB 9.4* 8.3* 8.6*   HCT 31.4* 28.1* 28.7*    202 222     CMP:   Recent Labs   Lab 06/08/23  1551 06/09/23  0541 06/10/23  0522    143 143   K 4.4 4.4 4.8   * 116* 115*   CO2 18* 17* 19*   * 113* 109   BUN 19 18 18   CREATININE 1.5* 1.4 1.5*   CALCIUM 9.1 8.8 8.9   PROT 7.6  --   --    ALBUMIN 3.4*  --   --    BILITOT 0.4  --   --    ALKPHOS 101  --   --    AST 31  --   --    ALT 22  --   --    ANIONGAP 10 10 9     POCT Glucose: No results for input(s): POCTGLUCOSE in the last 48 hours.  Troponin:   Recent Labs   Lab 06/08/23  1551 06/08/23 2024   TROPONINI 0.032* 0.040*       Significant Imaging: I have reviewed all pertinent imaging results/findings within the past 24 hours.

## 2023-06-10 NOTE — HOSPITAL COURSE
6/10/23-Patient seen and examined. Plan diuresis today.    6/11/23-Patient seen and examined. Continue diuresis.    6/12/23-Patient seen and examined today, ambulating in the hallway. Feels better, less SOB. Still with LE edema. H/H stable. Creatinine bumped to 1.7, diuretic dosage adjusted.    6/13/23-Patient seen and examined today, resting in bed. No AEON. Still with LE edema, although somewhat improved. No CP. Denies SOB. Creatinine stable at 1.7. Discussed with Dr. Harvey, will try torsemide as OP with close f/u and labs.

## 2023-06-10 NOTE — ASSESSMENT & PLAN NOTE
Appears near baseline without evidence of active bleeding noted.   Recent Labs   Lab 06/08/23  1551 06/09/23  0542 06/10/23  0522   HGB 9.4* 8.3* 8.6*   HCT 31.4* 28.1* 28.7*   MCV 98 97 97   MCHC 29.9* 29.5* 30.0*   RDW 17.9* 17.8* 17.7*    202 222     Plan:  -Monitor H/H and plts  -Type and screen, transfuse as needed   -Continue home medications   -Hold antiplatelets/anticoagulants in setting of active bleeding/pending surgical intervention

## 2023-06-10 NOTE — ASSESSMENT & PLAN NOTE
Troponin elevated to 0.032.  Elevation likely secondary to ischemic demand from acute CHF exacerbation.   Plan:  - tele monitoring   -trend troponin- elevated and flat  - repeat EKG p.r.n.   - nitro p.r.n.   - analgesics p.r.n.   - cards consulted   -diurese in progress

## 2023-06-11 PROBLEM — R60.0 LOCALIZED EDEMA: Status: ACTIVE | Noted: 2023-01-01

## 2023-06-11 NOTE — PROGRESS NOTES
[facility]  Progress Note    Patient Name: Jake Ortiz  MRN: [id number]  Patient Class: IP- Inpatient   Admission Date: 6/8/2023  Length of Stay: 2 days  Attending Physician: Priyanka Mazariegos MD  Primary Care Provider: Byron Stevens MD        Subjective:     Principal Problem:Acute on chronic combined systolic and diastolic congestive heart failure        HPI:  Jake Ortiz is a 82 y.o. 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 Presented to the ED for further evaluation at the request of his cardiologist, (Dr. Hood) for IV diuresis for CHF exacerbation.  Patient reports he was just discharged from skilled nursing facility 2 days ago on 06/06/2023 after being there for roughly 1 month for generalized weakness secondary to dehydration from over diuresing and UTI.  Patient states that he still feeling generally weak and feeling more short of breath and has developed worsening bilateral lower extremity edema over the last couple of days.  Patient states that he was initially on 20 mg Lasix b.I.d., but that dosing was cut in half due to VAIBHAV which resulted in the patient being hospitalized and eventually being sent to a skilled nursing facility.  Patient states that he has been compliant with his 20 mg Lasix q.d., but states he has not been urinating and has had worsening shortness of breath and fluid retention to his bilateral lower extremities.  Patient continues to ambulate with walker and states that he gets short-winded after walking a few feet.  Patient denies use of  oxygen at home nor does he use a CPAP machine even though upon chart review showed that patient is supposed to wear CPAP q.h.s..  Patient states that he was awaiting for the machine to be approved/delivered.  Otherwise, patient denies any fevers, chills, sweats, chest pain, palpitations, dysuria, or any other symptoms at this time.    ER workup revealed H/H of 9.4/31.4 (stable with history of anemia), BUN/creatinine of 19/1.5 with EGFR 47 (previously 46/2.2 with EGFR 30 on 05/29/2023), BNP of 415, troponin of 0.032, and UA revealing >100 RBC, +3 occult blood, +1 protein.  Chest x-ray revealed cardiomegaly with small bilateral pleural effusions correlating with underlying pleural fluid versus infiltrate vs edema.  EKG revealed ventricular paced rhythm at a rate of 65 beats per minute with a QT/QTC of 432/449.  Hospital Medicine consulted to admit patient for CHF exacerbation.  Patient and wife at bedside are in agreement with treatment plan.  Patient will be placed under observation status.    PCP: Byron Stevens        Overview/Hospital Course:  Patient admitted to observation for acute on chronic combined systolic and diastolic congestive heart failure.  Imaging supports Cardiomegaly with small bilateral pleural effusions with increased hazy bibasilar opacity which may correlate with layering pleural fluid versus infiltrate or edema.  IV Lasix initiated.  Previous echo results from 05/11/2023 reviewed.  Cardiology consulted.  Troponin elevated and flat secondary to volume strain. Of note, patient Ru[facility] recently discharged from [facility].  Current plan of care continued as previously prescribed. On 6/10/23, edema to BLE improved with pitting edema to ankles/feet and communicative dyspnea noted upon assessment. Case discussed with Cardiology and Lasix dose increased. Creatinine and H/H stable. On 6/11/23, diuresis continued. Pt encouraged to remain compliant with fluid restriction.        Interval History:  Patient is sitting in chair upon exam with mild symptom improvement verbalized.  Edema to bilateral ankles and feet noted.  Diuresis continued.  Compliance with fluid restriction encouraged.  Cardiology following.     Review of Systems   Constitutional:  Positive for fatigue. Negative for chills, diaphoresis and fever.   HENT:  Negative for congestion, postnasal drip, sinus pressure and trouble swallowing.    Respiratory:  Positive for shortness of breath and wheezing. Negative for cough.    Cardiovascular:  Positive for leg swelling. Negative for chest pain and palpitations.   Gastrointestinal:  Negative for abdominal distention, abdominal pain, diarrhea and vomiting.   Genitourinary:  Negative for difficulty urinating, flank pain, frequency and urgency.   Musculoskeletal:  Positive for arthralgias and gait problem (uses walker).   Skin:  Negative for wound.   Neurological:  Negative for dizziness, weakness and headaches.   Psychiatric/Behavioral:  Negative for agitation and confusion. The patient is not nervous/anxious.    All other systems reviewed and are negative.  Objective:     Vital Signs (Most Recent):  Temp: 98.1 °F (36.7 °C) (06/11/23 1609)  Pulse: 82 (06/11/23 1609)  Resp: 20 (06/11/23 1609)  BP: 130/60 (06/11/23 1609)  SpO2: (!) 93 % (06/11/23 1609) Vital Signs (24h Range):  Temp:  [97.7 °F (36.5 °C)-98.1 °F (36.7 °C)] 98.1 °F (36.7 °C)  Pulse:  [60-82] 82  Resp:  [18-20] 20  SpO2:  [93 %-98 %] 93 %  BP: (125-138)/(60-79) 130/60     Weight: 101.8 kg (224 lb 6.9 oz)  Body mass index is 32.2 kg/m².  No intake or output data in the 24 hours ending 06/11/23 1718      Physical Exam  Vitals and nursing note reviewed.   Constitutional:       General: He is awake. He is not in acute distress.     Appearance: Normal appearance. He is well-developed and well-groomed. He is not ill-appearing, toxic-appearing or diaphoretic.      Comments:   Elderly male resting comfortably in stretcher in  no acute distress   HENT:      Head: Normocephalic and atraumatic.   Eyes:      Extraocular Movements: Extraocular movements intact.      Conjunctiva/sclera: Conjunctivae normal.   Cardiovascular:      Rate and Rhythm: Normal rate and regular rhythm.      Pulses: Normal pulses.           Radial pulses are 2+ on the right side and 2+ on the left side.        Dorsalis pedis pulses are 2+ on the right side and 2+ on the left side.      Heart sounds: Normal heart sounds. No murmur heard.  Pulmonary:      Effort: Pulmonary effort is normal.      Breath sounds: Decreased breath sounds and rales (scant rales in bilat lower lobes-improved) present. No wheezing or rhonchi.   Abdominal:      General: Bowel sounds are normal.      Palpations: Abdomen is soft.      Tenderness: There is no abdominal tenderness.   Genitourinary:     Comments: Deferred   Musculoskeletal:         General: Swelling present.      Cervical back: Normal range of motion and neck supple.      Right lower le+ Edema present.      Left lower le+ Edema present.      Comments: 5/5 strength throughout   Skin:     General: Skin is warm and dry.      Capillary Refill: Capillary refill takes less than 2 seconds.   Neurological:      General: No focal deficit present.      Mental Status: He is alert and oriented to person, place, and time. Mental status is at baseline.      GCS: GCS eye subscore is 4. GCS verbal subscore is 5. GCS motor subscore is 6.      Cranial Nerves: Cranial nerves 2-12 are intact.      Sensory: Sensation is intact.      Motor: Motor function is intact.   Psychiatric:         Mood and Affect: Mood normal.         Behavior: Behavior normal. Behavior is cooperative.           Significant Labs: All pertinent labs within the past 24 hours have been reviewed.  CBC:   Recent Labs   Lab 06/10/23  0522   WBC 5.18   HGB 8.6*   HCT 28.7*        CMP:   Recent Labs   Lab 06/10/23  0522 06/11/23  0524    141   K 4.8 4.8   * 114*    CO2 19* 18*    115*   BUN 18 18   CREATININE 1.5* 1.5*   CALCIUM 8.9 8.8   ANIONGAP 9 9     Troponin:   Recent Labs   Lab 06/11/23  0524   TROPONINI 0.036*       Significant Imaging: I have reviewed all pertinent imaging results/findings within the past 24 hours.      Assessment/Plan:      * Acute on chronic combined systolic and diastolic congestive heart failure  Patient is identified as having Combined Systolic and Diastolic heart failure that is Acute on chronic. CHF is currently uncontrolled due to Continued edema of extremities, >3 pillow orthopnea, Rales/crackles on pulmonary exam and Pulmonary edema/pleural effusion on CXR. Latest ECHO performed and demonstrates- Results for orders placed during the hospital encounter of 05/10/23    Echo    Interpretation Summary  · Eccentric hypertrophy and low normal systolic function.  · Moderate left atrial enlargement.  · The estimated ejection fraction is 50%.  · Indeterminate left ventricular diastolic function.  · There is abnormal septal wall motion consistent with left bundle branch block.  · Normal right ventricular size with normal right ventricular systolic function.  · Mild-to-moderate mitral regurgitation.  · Mild to moderate tricuspid regurgitation.  . Continue ACE/ARB and Furosemide and monitor clinical status closely. Monitor on telemetry. Patient is off CHF pathway.  Monitor strict Is&Os and daily weights.  Place on fluid restriction of 1.5 L. Continue to stress to patient importance of self efficacy and  on diet for CHF. Last BNP reviewed- and noted below   Recent Labs   Lab 06/11/23  0814   *     Reports little diuresing from home Lasix dosing  20 mg q.d. received 40 mg Lasix IV in ED. Monitor overnight for urinary output.  May benefit in changing diuretic to Bumex to achieve desired diuretic effect.  -6/9/23- diuresis continued- Cardiology following- reports recent consumption of sodium rich foods- compliance with low sodium diet  and fluid restriction encouraged   -6/10/23- diuresis in progress- IV Lasix dose increased to BID- Cardiology following   6/11/23- diuresis continued- troponin flat with downward trend- compliance with fluid restriction encouraged      Localized edema  -in the setting of CHF exacerbation  -IV Lasix ordered with diuresis continued  -plan as previously discussed    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  Appears near baseline without evidence of active bleeding noted.   Recent Labs   Lab 06/08/23  1551 06/09/23  0542 06/10/23  0522   HGB 9.4* 8.3* 8.6*   HCT 31.4* 28.1* 28.7*   MCV 98 97 97   MCHC 29.9* 29.5* 30.0*   RDW 17.9* 17.8* 17.7*    202 222     Plan:  -Monitor H/H and plts  -Type and screen, transfuse as needed   -Continue home medications   -Hold antiplatelets/anticoagulants in setting of active bleeding/pending surgical intervention           Elevated troponin  Troponin elevated to 0.032.  Elevation likely secondary to ischemic demand from acute CHF exacerbation.   Plan:  - tele monitoring   -trend troponin- elevated and flat  - repeat EKG p.r.n.   - nitro p.r.n.   - analgesics p.r.n.   - cards consulted   -diuresis in progress      Obstructive sleep apnea  Awaiting approval for CPAP machine.  Not currently wearing any supplemental oxygen during the day or while sleeping at night.  In no acute respiratory distress.  Satting 100% on room air.    Plan:  - CPAP q.h.s. p.r.n.      Chronic restrictive lung disease  Patient with history of  Chronic restrictive lung disease not currently on inhalers or home oxygen not presently in acute exacerbation and is without signs/symptoms of distress.   Patient currently saturating 100 % on room air.  Plan:  -Continue home medications, titrate as needed  -Titrate oxygen requirements as needed  -Incentive spirometry   -Monitor pulse oximetry  -Duonebs prn          Atrial fibrillation with chronic anticoagulation with Eliquis  Patient with Paroxysmal (<7 days) atrial  fibrillation which is controlled currently with Pacemaker. Patient is currently in  Ventricular paced rhythm.[id number] Score: 4. HASBLED Score: . Anticoagulation indicated. Anticoagulation done with Eliquis.        S/P CABG x 3  -home medications resumed    MI, old  -home medications resumed      CAD (coronary artery disease)  Patient with known CAD s/p CABG, which is controlled Will continue Eliquis and Statin and monitor for S/Sx of angina/ACS. Continue to monitor on telemetry.         Hyperlipemia  Patient is chronically on statin.will continue for now. Last Lipid Panel:   Lab Results   Component Value Date    CHOL 124 07/14/2022    HDL 43 07/14/2022    LDLCALC 54.4 (L) 07/14/2022    TRIG 133 07/14/2022    CHOLHDL 34.7 07/14/2022     Plan:  -Continue home medication  -low fat/low calorie diet        Hypertension  Currently normotensive. BP usually well controlled per patient with home medications.  Plan:  -Optimize pain control   -Continue home medications ( Lasix and losartan), titrate as needed   -Monitor BP  -Low salt/cardiac diet  -IV hydralazine prn for SBP>160 or DBP>90           Diabetes Mellitus   - Accu-Cheks   -hemoglobin A1c 6- prediabetes     VTE Risk Mitigation (From admission, onward)           Ordered     apixaban tablet 2.5 mg  2 times daily         06/08/23 2010     Reason for No Pharmacological VTE Prophylaxis  Once        Question:  Reasons:  Answer:  Already adequately anticoagulated on oral Anticoagulants    06/08/23 1959     IP VTE HIGH RISK PATIENT  Once         06/08/23 1959     Place sequential compression device  Until discontinued         06/08/23 1959                    Discharge Planning   LITTLE:      Code Status: Full Code   Is the patient medically ready for discharge?: No    Reason for patient still in hospital (select all that apply): Patient trending condition, Laboratory test, Treatment and Consult recommendations  Discharge Plan A: Home Health                  Marybeth Damon,  NP  Department of Hospital Medicine   '[facility]

## 2023-06-11 NOTE — NURSING
Called respiratory dept to give prn breathing treatment to pt for wheezing.  
Pt given urinal to collect urine for strict I&Os. Pt and spouse educated on the need for strict I&Os. Understanding verbalized by pt and spouse.  
30-Aug-2018 22:00

## 2023-06-11 NOTE — ASSESSMENT & PLAN NOTE
Troponin elevated to 0.032.  Elevation likely secondary to ischemic demand from acute CHF exacerbation.   Plan:  - tele monitoring   -trend troponin- elevated and flat  - repeat EKG p.r.n.   - nitro p.r.n.   - analgesics p.r.n.   - cards consulted   -diuresis in progress

## 2023-06-11 NOTE — SUBJECTIVE & OBJECTIVE
Interval History:  Patient is sitting in chair upon exam with mild symptom improvement verbalized.  Edema to bilateral ankles and feet noted.  Diuresis continued.  Compliance with fluid restriction encouraged.  Cardiology following.     Review of Systems   Constitutional:  Positive for fatigue. Negative for chills, diaphoresis and fever.   HENT:  Negative for congestion, postnasal drip, sinus pressure and trouble swallowing.    Respiratory:  Positive for shortness of breath and wheezing. Negative for cough.    Cardiovascular:  Positive for leg swelling. Negative for chest pain and palpitations.   Gastrointestinal:  Negative for abdominal distention, abdominal pain, diarrhea and vomiting.   Genitourinary:  Negative for difficulty urinating, flank pain, frequency and urgency.   Musculoskeletal:  Positive for arthralgias and gait problem (uses walker).   Skin:  Negative for wound.   Neurological:  Negative for dizziness, weakness and headaches.   Psychiatric/Behavioral:  Negative for agitation and confusion. The patient is not nervous/anxious.    All other systems reviewed and are negative.  Objective:     Vital Signs (Most Recent):  Temp: 98.1 °F (36.7 °C) (06/11/23 1609)  Pulse: 82 (06/11/23 1609)  Resp: 20 (06/11/23 1609)  BP: 130/60 (06/11/23 1609)  SpO2: (!) 93 % (06/11/23 1609) Vital Signs (24h Range):  Temp:  [97.7 °F (36.5 °C)-98.1 °F (36.7 °C)] 98.1 °F (36.7 °C)  Pulse:  [60-82] 82  Resp:  [18-20] 20  SpO2:  [93 %-98 %] 93 %  BP: (125-138)/(60-79) 130/60     Weight: 101.8 kg (224 lb 6.9 oz)  Body mass index is 32.2 kg/m².  No intake or output data in the 24 hours ending 06/11/23 1718      Physical Exam  Vitals and nursing note reviewed.   Constitutional:       General: He is awake. He is not in acute distress.     Appearance: Normal appearance. He is well-developed and well-groomed. He is not ill-appearing, toxic-appearing or diaphoretic.      Comments:   Elderly male resting comfortably in stretcher in no  acute distress   HENT:      Head: Normocephalic and atraumatic.   Eyes:      Extraocular Movements: Extraocular movements intact.      Conjunctiva/sclera: Conjunctivae normal.   Cardiovascular:      Rate and Rhythm: Normal rate and regular rhythm.      Pulses: Normal pulses.           Radial pulses are 2+ on the right side and 2+ on the left side.        Dorsalis pedis pulses are 2+ on the right side and 2+ on the left side.      Heart sounds: Normal heart sounds. No murmur heard.  Pulmonary:      Effort: Pulmonary effort is normal.      Breath sounds: Decreased breath sounds and rales (scant rales in bilat lower lobes-improved) present. No wheezing or rhonchi.   Abdominal:      General: Bowel sounds are normal.      Palpations: Abdomen is soft.      Tenderness: There is no abdominal tenderness.   Genitourinary:     Comments: Deferred   Musculoskeletal:         General: Swelling present.      Cervical back: Normal range of motion and neck supple.      Right lower le+ Edema present.      Left lower le+ Edema present.      Comments: 5/5 strength throughout   Skin:     General: Skin is warm and dry.      Capillary Refill: Capillary refill takes less than 2 seconds.   Neurological:      General: No focal deficit present.      Mental Status: He is alert and oriented to person, place, and time. Mental status is at baseline.      GCS: GCS eye subscore is 4. GCS verbal subscore is 5. GCS motor subscore is 6.      Cranial Nerves: Cranial nerves 2-12 are intact.      Sensory: Sensation is intact.      Motor: Motor function is intact.   Psychiatric:         Mood and Affect: Mood normal.         Behavior: Behavior normal. Behavior is cooperative.           Significant Labs: All pertinent labs within the past 24 hours have been reviewed.  CBC:   Recent Labs   Lab 06/10/23  0522   WBC 5.18   HGB 8.6*   HCT 28.7*        CMP:   Recent Labs   Lab 06/10/23  0522 06/11/23  0524    141   K 4.8 4.8   * 114*    CO2 19* 18*    115*   BUN 18 18   CREATININE 1.5* 1.5*   CALCIUM 8.9 8.8   ANIONGAP 9 9     Troponin:   Recent Labs   Lab 06/11/23  0524   TROPONINI 0.036*       Significant Imaging: I have reviewed all pertinent imaging results/findings within the past 24 hours.

## 2023-06-11 NOTE — SUBJECTIVE & OBJECTIVE
Interval History: improved    Review of Systems   Constitutional: Negative for chills, diaphoresis, night sweats, weight gain and weight loss.   HENT:  Negative for congestion, hoarse voice, sore throat and stridor.    Eyes:  Negative for double vision and pain.   Cardiovascular:  Negative for chest pain, claudication, cyanosis, dyspnea on exertion, irregular heartbeat, leg swelling, near-syncope, orthopnea, palpitations, paroxysmal nocturnal dyspnea and syncope.   Respiratory:  Negative for cough, hemoptysis, shortness of breath, sleep disturbances due to breathing, snoring, sputum production and wheezing.    Endocrine: Negative for cold intolerance, heat intolerance and polydipsia.   Hematologic/Lymphatic: Negative for bleeding problem. Does not bruise/bleed easily.   Skin:  Negative for color change, dry skin and rash.   Musculoskeletal:  Negative for joint swelling and muscle cramps.   Gastrointestinal:  Negative for bloating, abdominal pain, constipation, diarrhea, dysphagia, melena, nausea and vomiting.   Genitourinary:  Negative for flank pain and urgency.   Neurological:  Negative for dizziness, focal weakness, headaches, light-headedness, loss of balance, seizures and weakness.   Psychiatric/Behavioral:  Negative for altered mental status and memory loss. The patient is not nervous/anxious.    Objective:     Vital Signs (Most Recent):  Temp: 97.7 °F (36.5 °C) (06/11/23 0734)  Pulse: 80 (06/11/23 0945)  Resp: 18 (06/11/23 0945)  BP: 125/79 (06/11/23 0734)  SpO2: 98 % (06/11/23 0945) Vital Signs (24h Range):  Temp:  [97.5 °F (36.4 °C)-98.2 °F (36.8 °C)] 97.7 °F (36.5 °C)  Pulse:  [60-80] 80  Resp:  [18-20] 18  SpO2:  [94 %-98 %] 98 %  BP: (125-163)/(60-79) 125/79     Weight: 101.8 kg (224 lb 6.9 oz)  Body mass index is 32.2 kg/m².     SpO2: 98 %       No intake or output data in the 24 hours ending 06/11/23 1116    Lines/Drains/Airways       Peripheral Intravenous Line  Duration                  Peripheral IV  - Single Lumen 06/08/23 1834 20 G Left Antecubital 2 days                       Physical Exam  Eyes:      Pupils: Pupils are equal, round, and reactive to light.   Neck:      Trachea: No tracheal deviation.   Cardiovascular:      Rate and Rhythm: Normal rate and regular rhythm.      Pulses: Intact distal pulses.           Carotid pulses are 2+ on the right side and 2+ on the left side.       Radial pulses are 2+ on the right side and 2+ on the left side.        Femoral pulses are 2+ on the right side and 2+ on the left side.       Popliteal pulses are 2+ on the right side and 2+ on the left side.        Dorsalis pedis pulses are 2+ on the right side and 2+ on the left side.        Posterior tibial pulses are 2+ on the right side and 2+ on the left side.      Heart sounds: Normal heart sounds. No murmur heard.    No friction rub. No gallop.   Pulmonary:      Effort: Pulmonary effort is normal. No respiratory distress.      Breath sounds: Normal breath sounds. No stridor. No wheezing or rales.   Chest:      Chest wall: No tenderness.   Abdominal:      General: There is no distension.      Tenderness: There is no abdominal tenderness. There is no rebound.   Musculoskeletal:         General: No tenderness.      Cervical back: Normal range of motion.      Right lower leg: Edema present.      Left lower leg: Edema present.   Skin:     General: Skin is warm and dry.   Neurological:      Mental Status: He is alert and oriented to person, place, and time.          Significant Labs: CMP   Recent Labs   Lab 06/10/23  0522 06/11/23  0524    141   K 4.8 4.8   * 114*   CO2 19* 18*    115*   BUN 18 18   CREATININE 1.5* 1.5*   CALCIUM 8.9 8.8   ANIONGAP 9 9       Significant Imaging: Echocardiogram: Transthoracic echo (TTE) complete ([facility] Only):   Results for orders placed or performed during the hospital encounter of 05/10/23   Echo   Result Value Ref Range    BSA 2.14 m2    LA WIDTH 4.20 cm    IVC diameter  2.87 cm    Left Ventricular Outflow Tract Mean Velocity 0.56 cm/s    Left Ventricular Outflow Tract Mean Gradient 1.55 mmHg    LVIDd 6.51 (A) 3.5 - 6.0 cm    IVS 0.96 0.6 - 1.1 cm    Posterior Wall 0.85 0.6 - 1.1 cm    Ao root annulus 3.83 cm    LVIDs 4.59 (A) 2.1 - 4.0 cm    FS 29 28 - 44 %    LA volume 132.20 cm3    STJ 3.46 cm    Ascending aorta 3.17 cm    LV mass 250.23 g    LA size 5.31 cm    RVDD 4.24 cm    TAPSE 1.90 cm    Left Ventricle Relative Wall Thickness 0.26 cm    AV mean gradient 6 mmHg    AV valve area 1.83 cm2    AV Velocity Ratio 0.58     AV index (prosthetic) 0.57     MV valve area p 1/2 method 2.75 cm2    E/A ratio 3.60     E wave deceleration time 276.19 msec    IVRT 51.38 msec    LVOT diameter 2.02 cm    LVOT area 3.2 cm2    LVOT peak wilian 0.91 m/s    LVOT peak VTI 16.70 cm    Ao peak wilian 1.57 m/s    Ao VTI 29.3 cm    RVOT peak wilian 0.77 m/s    RVOT peak VTI 14.5 cm    Mr max wilian 4.78 m/s    LVOT stroke volume 53.49 cm3    AV peak gradient 10 mmHg    PV mean gradient 1.03 mmHg    MV Peak E Wilian 1.08 m/s    TR Max Wilian 3.08 m/s    MV stenosis pressure 1/2 time 80.09 ms    MV Peak A Wilian 0.30 m/s    LV Systolic Volume 96.76 mL    LV Systolic Volume Index 46.3 mL/m2    LV Diastolic Volume 216.76 mL    LV Diastolic Volume Index 103.71 mL/m2    LA Volume Index 63.3 mL/m2    LV Mass Index 120 g/m2    RA Major Axis 7.76 cm    Left Atrium Minor Axis 6.88 cm    Left Atrium Major Axis 7.07 cm    Triscuspid Valve Regurgitation Peak Gradient 38 mmHg    EF 50 %    Narrative    · Eccentric hypertrophy and low normal systolic function.  · Moderate left atrial enlargement.  · The estimated ejection fraction is 50%.  · Indeterminate left ventricular diastolic function.  · There is abnormal septal wall motion consistent with left bundle branch   block.  · Normal right ventricular size with normal right ventricular systolic   function.  · Mild-to-moderate mitral regurgitation.  · Mild to moderate tricuspid  regurgitation.

## 2023-06-11 NOTE — PROGRESS NOTES
[facility]  Cardiology  Progress Note    Patient Name: Jake Ortiz  MRN: [id number]  Admission Date: 6/8/2023  Hospital Length of Stay: 2 days  Code Status: Full Code   Attending Physician: Priyanka Mazariegos MD   Primary Care Physician: Byron Stevens MD  Expected Discharge Date:   Principal Problem:Acute on chronic combined systolic and diastolic congestive heart failure    Subjective:     Hospital Course:   6/10/23-Patient seen and examined. Plan diuresis today.    6/11/23-Patient seen and examined. Continue diuresis.      Interval History: improved    Review of Systems   Constitutional: Negative for chills, diaphoresis, night sweats, weight gain and weight loss.   HENT:  Negative for congestion, hoarse voice, sore throat and stridor.    Eyes:  Negative for double vision and pain.   Cardiovascular:  Negative for chest pain, claudication, cyanosis, dyspnea on exertion, irregular heartbeat, leg swelling, near-syncope, orthopnea, palpitations, paroxysmal nocturnal dyspnea and syncope.   Respiratory:  Negative for cough, hemoptysis, shortness of breath, sleep disturbances due to breathing, snoring, sputum production and wheezing.    Endocrine: Negative for cold intolerance, heat intolerance and polydipsia.   Hematologic/Lymphatic: Negative for bleeding problem. Does not bruise/bleed easily.   Skin:  Negative for color change, dry skin and rash.   Musculoskeletal:  Negative for joint swelling and muscle cramps.   Gastrointestinal:  Negative for bloating, abdominal pain, constipation, diarrhea, dysphagia, melena, nausea and vomiting.   Genitourinary:  Negative for flank pain and urgency.   Neurological:  Negative for dizziness, focal weakness, headaches, light-headedness, loss of balance, seizures and weakness.   Psychiatric/Behavioral:  Negative for altered mental status and memory loss. The patient is not nervous/anxious.    Objective:     Vital Signs (Most Recent):  Temp: 97.7 °F (36.5 °C) (06/11/23  0734)  Pulse: 80 (06/11/23 0945)  Resp: 18 (06/11/23 0945)  BP: 125/79 (06/11/23 0734)  SpO2: 98 % (06/11/23 0945) Vital Signs (24h Range):  Temp:  [97.5 °F (36.4 °C)-98.2 °F (36.8 °C)] 97.7 °F (36.5 °C)  Pulse:  [60-80] 80  Resp:  [18-20] 18  SpO2:  [94 %-98 %] 98 %  BP: (125-163)/(60-79) 125/79     Weight: 101.8 kg (224 lb 6.9 oz)  Body mass index is 32.2 kg/m².     SpO2: 98 %       No intake or output data in the 24 hours ending 06/11/23 1116    Lines/Drains/Airways       Peripheral Intravenous Line  Duration                  Peripheral IV - Single Lumen 06/08/23 1834 20 G Left Antecubital 2 days                       Physical Exam  Eyes:      Pupils: Pupils are equal, round, and reactive to light.   Neck:      Trachea: No tracheal deviation.   Cardiovascular:      Rate and Rhythm: Normal rate and regular rhythm.      Pulses: Intact distal pulses.           Carotid pulses are 2+ on the right side and 2+ on the left side.       Radial pulses are 2+ on the right side and 2+ on the left side.        Femoral pulses are 2+ on the right side and 2+ on the left side.       Popliteal pulses are 2+ on the right side and 2+ on the left side.        Dorsalis pedis pulses are 2+ on the right side and 2+ on the left side.        Posterior tibial pulses are 2+ on the right side and 2+ on the left side.      Heart sounds: Normal heart sounds. No murmur heard.    No friction rub. No gallop.   Pulmonary:      Effort: Pulmonary effort is normal. No respiratory distress.      Breath sounds: Normal breath sounds. No stridor. No wheezing or rales.   Chest:      Chest wall: No tenderness.   Abdominal:      General: There is no distension.      Tenderness: There is no abdominal tenderness. There is no rebound.   Musculoskeletal:         General: No tenderness.      Cervical back: Normal range of motion.      Right lower leg: Edema present.      Left lower leg: Edema present.   Skin:     General: Skin is warm and dry.   Neurological:       Mental Status: He is alert and oriented to person, place, and time.          Significant Labs: CMP   Recent Labs   Lab 06/10/23  0522 06/11/23  0524    141   K 4.8 4.8   * 114*   CO2 19* 18*    115*   BUN 18 18   CREATININE 1.5* 1.5*   CALCIUM 8.9 8.8   ANIONGAP 9 9       Significant Imaging: Echocardiogram: Transthoracic echo (TTE) complete ([facility] Only):   Results for orders placed or performed during the hospital encounter of 05/10/23   Echo   Result Value Ref Range    BSA 2.14 m2    LA WIDTH 4.20 cm    IVC diameter 2.87 cm    Left Ventricular Outflow Tract Mean Velocity 0.56 cm/s    Left Ventricular Outflow Tract Mean Gradient 1.55 mmHg    LVIDd 6.51 (A) 3.5 - 6.0 cm    IVS 0.96 0.6 - 1.1 cm    Posterior Wall 0.85 0.6 - 1.1 cm    Ao root annulus 3.83 cm    LVIDs 4.59 (A) 2.1 - 4.0 cm    FS 29 28 - 44 %    LA volume 132.20 cm3    STJ 3.46 cm    Ascending aorta 3.17 cm    LV mass 250.23 g    LA size 5.31 cm    RVDD 4.24 cm    TAPSE 1.90 cm    Left Ventricle Relative Wall Thickness 0.26 cm    AV mean gradient 6 mmHg    AV valve area 1.83 cm2    AV Velocity Ratio 0.58     AV index (prosthetic) 0.57     MV valve area p 1/2 method 2.75 cm2    E/A ratio 3.60     E wave deceleration time 276.19 msec    IVRT 51.38 msec    LVOT diameter 2.02 cm    LVOT area 3.2 cm2    LVOT peak wilian 0.91 m/s    LVOT peak VTI 16.70 cm    Ao peak wilian 1.57 m/s    Ao VTI 29.3 cm    RVOT peak wilian 0.77 m/s    RVOT peak VTI 14.5 cm    Mr max wilian 4.78 m/s    LVOT stroke volume 53.49 cm3    AV peak gradient 10 mmHg    PV mean gradient 1.03 mmHg    MV Peak E Wilian 1.08 m/s    TR Max Wilian 3.08 m/s    MV stenosis pressure 1/2 time 80.09 ms    MV Peak A Wilian 0.30 m/s    LV Systolic Volume 96.76 mL    LV Systolic Volume Index 46.3 mL/m2    LV Diastolic Volume 216.76 mL    LV Diastolic Volume Index 103.71 mL/m2    LA Volume Index 63.3 mL/m2    LV Mass Index 120 g/m2    RA Major Axis 7.76 cm    Left Atrium Minor Axis 6.88 cm    Left Atrium  Major Axis 7.07 cm    Triscuspid Valve Regurgitation Peak Gradient 38 mmHg    EF 50 %    Narrative    · Eccentric hypertrophy and low normal systolic function.  · Moderate left atrial enlargement.  · The estimated ejection fraction is 50%.  · Indeterminate left ventricular diastolic function.  · There is abnormal septal wall motion consistent with left bundle branch   block.  · Normal right ventricular size with normal right ventricular systolic   function.  · Mild-to-moderate mitral regurgitation.  · Mild to moderate tricuspid regurgitation.        Assessment and Plan:     Brief HPI: continue diuresis    * Acute on chronic combined systolic and diastolic congestive heart failure  Patient is identified as having Combined Systolic and Diastolic heart failure that is Acute on chronic. CHF is currently uncontrolled due to Continued edema of extremities. Latest ECHO performed and demonstrates- Results for orders placed during the hospital encounter of 05/10/23    Echo    Interpretation Summary  · Eccentric hypertrophy and low normal systolic function.  · Moderate left atrial enlargement.  · The estimated ejection fraction is 50%.  · Indeterminate left ventricular diastolic function.  · There is abnormal septal wall motion consistent with left bundle branch block.  · Normal right ventricular size with normal right ventricular systolic function.  · Mild-to-moderate mitral regurgitation.  · Mild to moderate tricuspid regurgitation.  . Continue ACE/ARB and Furosemide and monitor clinical status closely. Monitor on telemetry. Patient is on CHF pathway.  Monitor strict Is&Os and daily weights.  Place on fluid restriction of 2 L. Continue to stress to patient importance of self efficacy and  on diet for CHF. Last BNP reviewed- and noted below   Recent Labs   Lab 06/08/23  1551   *   .  6/10/23-continue diuresis    Localized edema  Continue diuresis    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  Per hospital  medicine    Elevated troponin  Demand ischemia, trend    Atrial fibrillation with chronic anticoagulation with Eliquis  Continue eliquis    S/P CABG x 3  Follow, no angina    CAD (coronary artery disease)  Stable no angina    Hyperlipemia  Continue statin    Hypertension  Continue losartan        VTE Risk Mitigation (From admission, onward)         Ordered     apixaban tablet 2.5 mg  2 times daily         06/08/23 2010     Reason for No Pharmacological VTE Prophylaxis  Once        Question:  Reasons:  Answer:  Already adequately anticoagulated on oral Anticoagulants    06/08/23 1959     IP VTE HIGH RISK PATIENT  Once         06/08/23 1959     Place sequential compression device  Until discontinued         06/08/23 1959                Jer Rushing MD  Cardiology  [facility]

## 2023-06-11 NOTE — ASSESSMENT & PLAN NOTE
Patient with Paroxysmal (<7 days) atrial fibrillation which is controlled currently with Pacemaker. Patient is currently in  Ventricular paced rhythm.[id number] Score: 4. HASBLED Score: . Anticoagulation indicated. Anticoagulation done with Eliquis.

## 2023-06-11 NOTE — ASSESSMENT & PLAN NOTE
-in the setting of CHF exacerbation  -IV Lasix ordered with diuresis continued  -plan as previously discussed

## 2023-06-11 NOTE — ASSESSMENT & PLAN NOTE
Patient is identified as having Combined Systolic and Diastolic heart failure that is Acute on chronic. CHF is currently uncontrolled due to Continued edema of extremities, >3 pillow orthopnea, Rales/crackles on pulmonary exam and Pulmonary edema/pleural effusion on CXR. Latest ECHO performed and demonstrates- Results for orders placed during the hospital encounter of 05/10/23    Echo    Interpretation Summary  · Eccentric hypertrophy and low normal systolic function.  · Moderate left atrial enlargement.  · The estimated ejection fraction is 50%.  · Indeterminate left ventricular diastolic function.  · There is abnormal septal wall motion consistent with left bundle branch block.  · Normal right ventricular size with normal right ventricular systolic function.  · Mild-to-moderate mitral regurgitation.  · Mild to moderate tricuspid regurgitation.  . Continue ACE/ARB and Furosemide and monitor clinical status closely. Monitor on telemetry. Patient is off CHF pathway.  Monitor strict Is&Os and daily weights.  Place on fluid restriction of 1.5 L. Continue to stress to patient importance of self efficacy and  on diet for CHF. Last BNP reviewed- and noted below   Recent Labs   Lab 06/11/23  0814   *     Reports little diuresing from home Lasix dosing  20 mg q.d. received 40 mg Lasix IV in ED. Monitor overnight for urinary output.  May benefit in changing diuretic to Bumex to achieve desired diuretic effect.  -6/9/23- diuresis continued- Cardiology following- reports recent consumption of sodium rich foods- compliance with low sodium diet and fluid restriction encouraged   -6/10/23- diuresis in progress- IV Lasix dose increased to BID- Cardiology following   6/11/23- diuresis continued- troponin flat with downward trend- compliance with fluid restriction encouraged

## 2023-06-12 PROBLEM — R79.89 ELEVATED TROPONIN: Status: RESOLVED | Noted: 2021-12-23 | Resolved: 2023-01-01

## 2023-06-12 NOTE — ASSESSMENT & PLAN NOTE
Patient is identified as having Combined Systolic and Diastolic heart failure that is Acute on chronic. CHF is currently uncontrolled due to Continued edema of extremities, >3 pillow orthopnea, Rales/crackles on pulmonary exam and Pulmonary edema/pleural effusion on CXR. Latest ECHO performed and demonstrates- Results for orders placed during the hospital encounter of 05/10/23    Echo    Interpretation Summary  · Eccentric hypertrophy and low normal systolic function.  · Moderate left atrial enlargement.  · The estimated ejection fraction is 50%.  · Indeterminate left ventricular diastolic function.  · There is abnormal septal wall motion consistent with left bundle branch block.  · Normal right ventricular size with normal right ventricular systolic function.  · Mild-to-moderate mitral regurgitation.  · Mild to moderate tricuspid regurgitation.  . Continue ACE/ARB and Furosemide and monitor clinical status closely. Monitor on telemetry. Patient is off CHF pathway.  Monitor strict Is&Os and daily weights.  Place on fluid restriction of 1.5 L. Continue to stress to patient importance of self efficacy and  on diet for CHF. Last BNP reviewed- and noted below   Recent Labs   Lab 06/11/23  0814   *     Reports little diuresing from home Lasix dosing  20 mg q.d. received 40 mg Lasix IV in ED. Monitor overnight for urinary output.  May benefit in changing diuretic to Bumex to achieve desired diuretic effect.  -6/9/23- diuresis continued- Cardiology following- reports recent consumption of sodium rich foods- compliance with low sodium diet and fluid restriction encouraged   -6/10/23- diuresis in progress- IV Lasix dose increased to BID- Cardiology following   6/11/23- diuresis continued- troponin flat with downward trend- compliance with fluid restriction encouraged  6/12: cont diuretics per cards, possible dc tomorrow

## 2023-06-12 NOTE — SUBJECTIVE & OBJECTIVE
Interval History: Patient still with swelling in bilateral lower ext however improving. No issues overnight.     Review of Systems   Constitutional:  Positive for fatigue. Negative for chills, diaphoresis and fever.   HENT:  Negative for congestion, postnasal drip, sinus pressure and trouble swallowing.    Respiratory:  Positive for shortness of breath and wheezing. Negative for cough.    Cardiovascular:  Positive for leg swelling. Negative for chest pain and palpitations.   Gastrointestinal:  Negative for abdominal distention, abdominal pain, diarrhea and vomiting.   Genitourinary:  Negative for difficulty urinating, flank pain, frequency and urgency.   Musculoskeletal:  Positive for arthralgias and gait problem (uses walker).   Skin:  Negative for wound.   Neurological:  Negative for dizziness, weakness and headaches.   Psychiatric/Behavioral:  Negative for agitation and confusion. The patient is not nervous/anxious.    All other systems reviewed and are negative.  Objective:     Vital Signs (Most Recent):  Temp: 98.4 °F (36.9 °C) (06/12/23 0811)  Pulse: 66 (06/12/23 0811)  Resp: 18 (06/12/23 0811)  BP: (!) 147/67 (06/12/23 0811)  SpO2: 97 % (06/12/23 0811) Vital Signs (24h Range):  Temp:  [98.1 °F (36.7 °C)-98.9 °F (37.2 °C)] 98.4 °F (36.9 °C)  Pulse:  [61-82] 66  Resp:  [18-20] 18  SpO2:  [93 %-97 %] 97 %  BP: (106-147)/(53-67) 147/67     Weight: 101.8 kg (224 lb 6.9 oz)  Body mass index is 32.2 kg/m².    Intake/Output Summary (Last 24 hours) at 6/12/2023 1033  Last data filed at 6/12/2023 0458  Gross per 24 hour   Intake 44.5 ml   Output 600 ml   Net -555.5 ml         Physical Exam  Constitutional:       General: He is not in acute distress.     Appearance: He is well-developed. He is not diaphoretic.   HENT:      Head: Normocephalic and atraumatic.   Eyes:      Pupils: Pupils are equal, round, and reactive to light.   Cardiovascular:      Rate and Rhythm: Normal rate and regular rhythm.      Heart sounds:  Normal heart sounds. No murmur heard.    No friction rub. No gallop.   Pulmonary:      Effort: Pulmonary effort is normal. No respiratory distress.      Breath sounds: Normal breath sounds. No stridor. No wheezing or rales.   Abdominal:      General: Bowel sounds are normal. There is no distension.      Palpations: Abdomen is soft. There is no mass.      Tenderness: There is no abdominal tenderness. There is no guarding.   Musculoskeletal:      Right lower leg: Edema present.      Left lower leg: Edema present.   Skin:     General: Skin is warm.      Findings: No erythema.   Neurological:      Mental Status: He is alert and oriented to person, place, and time.           Significant Labs: All pertinent labs within the past 24 hours have been reviewed.    Significant Imaging: I have reviewed all pertinent imaging results/findings within the past 24 hours.

## 2023-06-12 NOTE — ASSESSMENT & PLAN NOTE
Patient is identified as having Combined Systolic and Diastolic heart failure that is Acute on chronic. CHF is currently uncontrolled due to Continued edema of extremities. Latest ECHO performed and demonstrates- Results for orders placed during the hospital encounter of 05/10/23    Echo    Interpretation Summary  · Eccentric hypertrophy and low normal systolic function.  · Moderate left atrial enlargement.  · The estimated ejection fraction is 50%.  · Indeterminate left ventricular diastolic function.  · There is abnormal septal wall motion consistent with left bundle branch block.  · Normal right ventricular size with normal right ventricular systolic function.  · Mild-to-moderate mitral regurgitation.  · Mild to moderate tricuspid regurgitation.  . Continue ACE/ARB and Furosemide and monitor clinical status closely. Monitor on telemetry. Patient is on CHF pathway.  Monitor strict Is&Os and daily weights.  Place on fluid restriction of 2 L. Continue to stress to patient importance of self efficacy and  on diet for CHF. Last BNP reviewed- and noted below   Recent Labs   Lab 06/11/23  0814   *   .  6/10/23-continue diuresis    6/12/23  -Creatinine up-trending diuretics adjusted

## 2023-06-12 NOTE — PROGRESS NOTES
[facility]  Cardiology  Progress Note    Patient Name: Jake Ortiz  MRN: [id number]  Admission Date: 6/8/2023  Hospital Length of Stay: 3 days  Code Status: Full Code   Attending Physician: Chano Sanz MD   Primary Care Physician: Byron Stevens MD  Expected Discharge Date:   Principal Problem:Acute on chronic combined systolic and diastolic congestive heart failure    Subjective:   HPI:  History of Present Illness: Jake Ortiz is a 82 y.o. male patient with a PMHx of atrial fibrillation, CHF, CKD, CAD s/p CABG, DM2, HLD, HTN, MI, NASREEN, and prostate cancer who presents to the Emergency Department because he was sent by Dr. Hood (Cardiology) for IV diuresis. Currently, the pt is taking 20 mg of Lasix at home. Pt c/o generalized weakness, SOB upon exertion, and bilateral leg swelling. Pt's wife also reports that the pt has chronic diarrhea. Pt has been having 2 episodes of diarrhea per day. Symptoms are constant and moderate in severity. No mitigating or exacerbating factors reported. Patient denies any fever, chills, cough, CP, numbness, headaches, and all other sxs at this time. No further complaints or concerns at this time.   Patient seen and examined and improved CHF and edema. Plan continued diuresis for next 24 hours.    Hospital Course:   6/10/23-Patient seen and examined. Plan diuresis today.    6/11/23-Patient seen and examined. Continue diuresis.    6/12/23-Patient seen and examined today, ambulating in the hallway. Feels better, less SOB. Still with LE edema. H/H stable. Creatinine bumped to 1.7, diuretic dosage adjusted.          Review of Systems   Constitutional: Positive for malaise/fatigue.   HENT: Negative.     Eyes: Negative.    Cardiovascular:  Positive for dyspnea on exertion and leg swelling.   Respiratory: Negative.     Endocrine: Negative.    Hematologic/Lymphatic: Negative.    Skin: Negative.    Musculoskeletal: Negative.    Gastrointestinal: Negative.    Genitourinary: Negative.     Neurological: Negative.    Psychiatric/Behavioral: Negative.     Allergic/Immunologic: Negative.    Objective:     Vital Signs (Most Recent):  Temp: 98.4 °F (36.9 °C) (06/12/23 1218)  Pulse: 72 (06/12/23 1218)  Resp: 19 (06/12/23 1218)  BP: 136/62 (06/12/23 1218)  SpO2: 95 % (06/12/23 1218) Vital Signs (24h Range):  Temp:  [98.1 °F (36.7 °C)-98.9 °F (37.2 °C)] 98.4 °F (36.9 °C)  Pulse:  [61-82] 72  Resp:  [18-20] 19  SpO2:  [93 %-97 %] 95 %  BP: (106-147)/(53-67) 136/62     Weight: 101.8 kg (224 lb 6.9 oz)  Body mass index is 32.2 kg/m².     SpO2: 95 %         Intake/Output Summary (Last 24 hours) at 6/12/2023 1224  Last data filed at 6/12/2023 0849  Gross per 24 hour   Intake 164.5 ml   Output 600 ml   Net -435.5 ml       Lines/Drains/Airways       Peripheral Intravenous Line  Duration                  Peripheral IV - Single Lumen 06/10/23 0255 22 G Posterior;Right Hand 2 days                       Physical Exam  Vitals and nursing note reviewed.   Constitutional:       General: He is not in acute distress.     Appearance: Normal appearance. He is well-developed. He is not diaphoretic.   HENT:      Head: Normocephalic and atraumatic.   Eyes:      General:         Right eye: No discharge.         Left eye: No discharge.      Pupils: Pupils are equal, round, and reactive to light.   Neck:      Thyroid: No thyromegaly.      Vascular: No JVD.      Trachea: No tracheal deviation.   Cardiovascular:      Rate and Rhythm: Normal rate and regular rhythm.      Heart sounds: Normal heart sounds, S1 normal and S2 normal. No murmur heard.  Pulmonary:      Effort: Pulmonary effort is normal. No respiratory distress.      Breath sounds: Normal breath sounds. No wheezing or rales.   Abdominal:      General: There is no distension.      Palpations: Abdomen is soft.      Tenderness: There is no rebound.   Musculoskeletal:      Cervical back: Neck supple.      Right lower leg: Edema present.      Left lower leg: Edema present.    Skin:     General: Skin is warm and dry.      Coloration: Skin is pale.      Findings: No erythema.   Neurological:      General: No focal deficit present.      Mental Status: He is alert and oriented to person, place, and time.   Psychiatric:         Mood and Affect: Mood normal.         Behavior: Behavior normal.         Thought Content: Thought content normal.          Significant Labs: CMP   Recent Labs   Lab 06/11/23  0524 06/12/23  0533    139   K 4.8 4.6   * 111*   CO2 18* 17*   * 111*   BUN 18 22   CREATININE 1.5* 1.7*   CALCIUM 8.8 8.7   ANIONGAP 9 11   , CBC   Recent Labs   Lab 06/12/23  1011   WBC 7.07   HGB 8.8*   HCT 29.3*      , Troponin   Recent Labs   Lab 06/11/23  0524   TROPONINI 0.036*   , and All pertinent lab results from the last 24 hours have been reviewed.    Significant Imaging: Echocardiogram: Transthoracic echo (TTE) complete ([facility]upid Only):   Results for orders placed or performed during the hospital encounter of 05/10/23   Echo   Result Value Ref Range    BSA 2.14 m2    LA WIDTH 4.20 cm    IVC diameter 2.87 cm    Left Ventricular Outflow Tract Mean Velocity 0.56 cm/s    Left Ventricular Outflow Tract Mean Gradient 1.55 mmHg    LVIDd 6.51 (A) 3.5 - 6.0 cm    IVS 0.96 0.6 - 1.1 cm    Posterior Wall 0.85 0.6 - 1.1 cm    Ao root annulus 3.83 cm    LVIDs 4.59 (A) 2.1 - 4.0 cm    FS 29 28 - 44 %    LA volume 132.20 cm3    STJ 3.46 cm    Ascending aorta 3.17 cm    LV mass 250.23 g    LA size 5.31 cm    RVDD 4.24 cm    TAPSE 1.90 cm    Left Ventricle Relative Wall Thickness 0.26 cm    AV mean gradient 6 mmHg    AV valve area 1.83 cm2    AV Velocity Ratio 0.58     AV index (prosthetic) 0.57     MV valve area p 1/2 method 2.75 cm2    E/A ratio 3.60     E wave deceleration time 276.19 msec    IVRT 51.38 msec    LVOT diameter 2.02 cm    LVOT area 3.2 cm2    LVOT peak jacqui 0.91 m/s    LVOT peak VTI 16.70 cm    Ao peak jacqui 1.57 m/s    Ao VTI 29.3 cm    RVOT peak jacqui 0.77 m/s     RVOT peak VTI 14.5 cm    Mr max wilian 4.78 m/s    LVOT stroke volume 53.49 cm3    AV peak gradient 10 mmHg    PV mean gradient 1.03 mmHg    MV Peak E Wilian 1.08 m/s    TR Max Wilian 3.08 m/s    MV stenosis pressure 1/2 time 80.09 ms    MV Peak A Wilian 0.30 m/s    LV Systolic Volume 96.76 mL    LV Systolic Volume Index 46.3 mL/m2    LV Diastolic Volume 216.76 mL    LV Diastolic Volume Index 103.71 mL/m2    LA Volume Index 63.3 mL/m2    LV Mass Index 120 g/m2    RA Major Axis 7.76 cm    Left Atrium Minor Axis 6.88 cm    Left Atrium Major Axis 7.07 cm    Triscuspid Valve Regurgitation Peak Gradient 38 mmHg    EF 50 %    Narrative    · Eccentric hypertrophy and low normal systolic function.  · Moderate left atrial enlargement.  · The estimated ejection fraction is 50%.  · Indeterminate left ventricular diastolic function.  · There is abnormal septal wall motion consistent with left bundle branch   block.  · Normal right ventricular size with normal right ventricular systolic   function.  · Mild-to-moderate mitral regurgitation.  · Mild to moderate tricuspid regurgitation.      , EKG: Reviewed, and X-Ray: CXR: X-Ray Chest 1 View (CXR):   Results for orders placed or performed during the hospital encounter of 06/08/23   X-Ray Chest 1 View    Narrative    EXAMINATION:  XR CHEST 1 VIEW    CLINICAL HISTORY:  Dyspnea, unspecified    TECHNIQUE:  Single frontal view of the chest was performed.    COMPARISON:  Chest radiograph 05/29/2023    FINDINGS:  Cardiac pacer overlies the upper lateral left hemithorax with 3 leads appearing in similar position.  Spinal stimulator overlies the midline, appearing in similar position.  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  Mild haziness at the lung bases noted.  No pneumothorax.    The cardiac silhouette is enlarged, similar to the comparison exam.  There is  aortic calcification.    No acute osseous abnormality.      Impression    Cardiomegaly with small bilateral pleural effusions.  Increased hazy bibasilar opacity which may correlate with layering pleural fluid versus infiltrate or edema.      Electronically signed by: Dodie Richardson  Date:    06/08/2023  Time:    16:20    and X-Ray Chest PA and Lateral (CXR): No results found for this visit on 06/08/23.    Assessment and Plan:   Patient who presents with decompensated CHF. Creatinine up-trending. Diuretics adjusted. Reassess in AM.    * Acute on chronic combined systolic and diastolic congestive heart failure  Patient is identified as having Combined Systolic and Diastolic heart failure that is Acute on chronic. CHF is currently uncontrolled due to Continued edema of extremities. Latest ECHO performed and demonstrates- Results for orders placed during the hospital encounter of 05/10/23    Echo    Interpretation Summary  · Eccentric hypertrophy and low normal systolic function.  · Moderate left atrial enlargement.  · The estimated ejection fraction is 50%.  · Indeterminate left ventricular diastolic function.  · There is abnormal septal wall motion consistent with left bundle branch block.  · Normal right ventricular size with normal right ventricular systolic function.  · Mild-to-moderate mitral regurgitation.  · Mild to moderate tricuspid regurgitation.  . Continue ACE/ARB and Furosemide and monitor clinical status closely. Monitor on telemetry. Patient is on CHF pathway.  Monitor strict Is&Os and daily weights.  Place on fluid restriction of 2 L. Continue to stress to patient importance of self efficacy and  on diet for CHF. Last BNP reviewed- and noted below   Recent Labs   Lab 06/11/23  0814   *   .  6/10/23-continue diuresis    6/12/23  -Creatinine up-trending diuretics adjusted    Localized edema  Continue diuresis    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  Per hospital medicine    Atrial  fibrillation with chronic anticoagulation with Eliquis  Continue eliquis    S/P CABG x 3  Follow, no angina    CAD (coronary artery disease)  Stable no angina    Hyperlipemia  Continue statin    Hypertension  Continue losartan        VTE Risk Mitigation (From admission, onward)         Ordered     apixaban tablet 2.5 mg  2 times daily         06/08/23 2010     Reason for No Pharmacological VTE Prophylaxis  Once        Question:  Reasons:  Answer:  Already adequately anticoagulated on oral Anticoagulants    06/08/23 1959     IP VTE HIGH RISK PATIENT  Once         06/08/23 1959     Place sequential compression device  Until discontinued         06/08/23 1959                Anuradha Richardson PA-C  Cardiology  [facility]

## 2023-06-12 NOTE — PROGRESS NOTES
[facility] [place] Medicine  Progress Note    Patient Name: Jake Ortiz  MRN: [id number]  Patient Class: IP- Inpatient   Admission Date: 6/8/2023  Length of Stay: 3 days  Attending Physician: Chano Sanz MD  Primary Care Provider: Byron Stevens MD        Subjective:     Principal Problem:Acute on chronic combined systolic and diastolic congestive heart failure        HPI:  Jake Ortiz is a 82 y.o. 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 Presented to the ED for further evaluation at the request of his cardiologist, (Dr. Hood) for IV diuresis for CHF exacerbation.  Patient reports he was just discharged from skilled nursing facility 2 days ago on 06/06/2023 after being there for roughly 1 month for generalized weakness secondary to dehydration from over diuresing and UTI.  Patient states that he still feeling generally weak and feeling more short of breath and has developed worsening bilateral lower extremity edema over the last couple of days.  Patient states that he was initially on 20 mg Lasix b.I.d., but that dosing was cut in half due to VAIBHAV which resulted in the patient being hospitalized and eventually being sent to a skilled nursing facility.  Patient states that he has been compliant with his 20 mg Lasix q.d., but states he has not been urinating and has had worsening shortness of breath and fluid retention to his bilateral lower extremities.  Patient continues to ambulate with walker and states that he gets short-winded after walking a few feet.  Patient denies use of oxygen at home  nor does he use a CPAP machine even though upon chart review showed that patient is supposed to wear CPAP q.h.s..  Patient states that he was awaiting for the machine to be approved/delivered.  Otherwise, patient denies any fevers, chills, sweats, chest pain, palpitations, dysuria, or any other symptoms at this time.    ER workup revealed H/H of 9.4/31.4 (stable with history of anemia), BUN/creatinine of 19/1.5 with EGFR 47 (previously 46/2.2 with EGFR 30 on 05/29/2023), BNP of 415, troponin of 0.032, and UA revealing >100 RBC, +3 occult blood, +1 protein.  Chest x-ray revealed cardiomegaly with small bilateral pleural effusions correlating with underlying pleural fluid versus infiltrate vs edema.  EKG revealed ventricular paced rhythm at a rate of 65 beats per minute with a QT/QTC of 432/449.  Hospital Medicine consulted to admit patient for CHF exacerbation.  Patient and wife at bedside are in agreement with treatment plan.  Patient will be placed under observation status.    PCP: Byron Stevens        Overview/Hospital Course:  Patient admitted to observation for acute on chronic combined systolic and diastolic congestive heart failure.  Imaging supports Cardiomegaly with small bilateral pleural effusions with increased hazy bibasilar opacity which may correlate with layering pleural fluid versus infiltrate or edema.  IV Lasix initiated.  Previous echo results from 05/11/2023 reviewed.  Cardiology consulted.  Troponin elevated and flat secondary to volume strain. Of note, patient Ru[facility] recently discharged from [facility].  Current plan of care continued as previously prescribed. On 6/10/23, edema to BLE improved with pitting edema to ankles/feet and communicative dyspnea noted upon assessment. Case discussed with Cardiology and Lasix dose increased. Creatinine and H/H stable. On 6/11/23, diuresis continued. Pt encouraged to remain compliant with fluid restriction.   6/12: diuretics  being adjusted by cards. LE edema improving.       Interval History: Patient still with swelling in bilateral lower ext however improving. No issues overnight.     Review of Systems   Constitutional:  Positive for fatigue. Negative for chills, diaphoresis and fever.   HENT:  Negative for congestion, postnasal drip, sinus pressure and trouble swallowing.    Respiratory:  Positive for shortness of breath and wheezing. Negative for cough.    Cardiovascular:  Positive for leg swelling. Negative for chest pain and palpitations.   Gastrointestinal:  Negative for abdominal distention, abdominal pain, diarrhea and vomiting.   Genitourinary:  Negative for difficulty urinating, flank pain, frequency and urgency.   Musculoskeletal:  Positive for arthralgias and gait problem (uses walker).   Skin:  Negative for wound.   Neurological:  Negative for dizziness, weakness and headaches.   Psychiatric/Behavioral:  Negative for agitation and confusion. The patient is not nervous/anxious.    All other systems reviewed and are negative.  Objective:     Vital Signs (Most Recent):  Temp: 98.4 °F (36.9 °C) (06/12/23 0811)  Pulse: 66 (06/12/23 0811)  Resp: 18 (06/12/23 0811)  BP: (!) 147/67 (06/12/23 0811)  SpO2: 97 % (06/12/23 0811) Vital Signs (24h Range):  Temp:  [98.1 °F (36.7 °C)-98.9 °F (37.2 °C)] 98.4 °F (36.9 °C)  Pulse:  [61-82] 66  Resp:  [18-20] 18  SpO2:  [93 %-97 %] 97 %  BP: (106-147)/(53-67) 147/67     Weight: 101.8 kg (224 lb 6.9 oz)  Body mass index is 32.2 kg/m².    Intake/Output Summary (Last 24 hours) at 6/12/2023 1033  Last data filed at 6/12/2023 0458  Gross per 24 hour   Intake 44.5 ml   Output 600 ml   Net -555.5 ml         Physical Exam  Constitutional:       General: He is not in acute distress.     Appearance: He is well-developed. He is not diaphoretic.   HENT:      Head: Normocephalic and atraumatic.   Eyes:      Pupils: Pupils are equal, round, and reactive to light.   Cardiovascular:      Rate and Rhythm:  Normal rate and regular rhythm.      Heart sounds: Normal heart sounds. No murmur heard.    No friction rub. No gallop.   Pulmonary:      Effort: Pulmonary effort is normal. No respiratory distress.      Breath sounds: Normal breath sounds. No stridor. No wheezing or rales.   Abdominal:      General: Bowel sounds are normal. There is no distension.      Palpations: Abdomen is soft. There is no mass.      Tenderness: There is no abdominal tenderness. There is no guarding.   Musculoskeletal:      Right lower leg: Edema present.      Left lower leg: Edema present.   Skin:     General: Skin is warm.      Findings: No erythema.   Neurological:      Mental Status: He is alert and oriented to person, place, and time.           Significant Labs: All pertinent labs within the past 24 hours have been reviewed.    Significant Imaging: I have reviewed all pertinent imaging results/findings within the past 24 hours.        Assessment/Plan:      * Acute on chronic combined systolic and diastolic congestive heart failure  Patient is identified as having Combined Systolic and Diastolic heart failure that is Acute on chronic. CHF is currently uncontrolled due to Continued edema of extremities, >3 pillow orthopnea, Rales/crackles on pulmonary exam and Pulmonary edema/pleural effusion on CXR. Latest ECHO performed and demonstrates- Results for orders placed during the hospital encounter of 05/10/23    Echo    Interpretation Summary  · Eccentric hypertrophy and low normal systolic function.  · Moderate left atrial enlargement.  · The estimated ejection fraction is 50%.  · Indeterminate left ventricular diastolic function.  · There is abnormal septal wall motion consistent with left bundle branch block.  · Normal right ventricular size with normal right ventricular systolic function.  · Mild-to-moderate mitral regurgitation.  · Mild to moderate tricuspid regurgitation.  . Continue ACE/ARB and Furosemide and monitor clinical status  closely. Monitor on telemetry. Patient is off CHF pathway.  Monitor strict Is&Os and daily weights.  Place on fluid restriction of 1.5 L. Continue to stress to patient importance of self efficacy and  on diet for CHF. Last BNP reviewed- and noted below   Recent Labs   Lab 06/11/23  0814   *     Reports little diuresing from home Lasix dosing  20 mg q.d. received 40 mg Lasix IV in ED. Monitor overnight for urinary output.  May benefit in changing diuretic to Bumex to achieve desired diuretic effect.  -6/9/23- diuresis continued- Cardiology following- reports recent consumption of sodium rich foods- compliance with low sodium diet and fluid restriction encouraged   -6/10/23- diuresis in progress- IV Lasix dose increased to BID- Cardiology following   6/11/23- diuresis continued- troponin flat with downward trend- compliance with fluid restriction encouraged  6/12: cont diuretics per cards, possible dc tomorrow       Localized edema  -in the setting of CHF exacerbation  -IV Lasix ordered with diuresis continued  -plan as previously discussed    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  Appears near baseline without evidence of active bleeding noted.   Recent Labs   Lab 06/08/23  1551 06/09/23  0542 06/10/23  0522   HGB 9.4* 8.3* 8.6*   HCT 31.4* 28.1* 28.7*   MCV 98 97 97   MCHC 29.9* 29.5* 30.0*   RDW 17.9* 17.8* 17.7*    202 222     Plan:  -Monitor H/H and plts  -Type and screen, transfuse as needed   -Continue home medications   -Hold antiplatelets/anticoagulants in setting of active bleeding/pending surgical intervention           Obstructive sleep apnea  Awaiting approval for CPAP machine.  Not currently wearing any supplemental oxygen during the day or while sleeping at night.  In no acute respiratory distress.  Satting 100% on room air.    Plan:  - CPAP q.h.s. p.r.n.      Chronic restrictive lung disease  Patient with history of  Chronic restrictive lung disease not currently on inhalers or  home oxygen not presently in acute exacerbation and is without signs/symptoms of distress.   Patient currently saturating 100 % on room air.  Plan:  -Continue home medications, titrate as needed  -Titrate oxygen requirements as needed  -Incentive spirometry   -Monitor pulse oximetry  -Duonebs prn          Atrial fibrillation with chronic anticoagulation with Eliquis  Patient with Paroxysmal (<7 days) atrial fibrillation which is controlled currently with Pacemaker. Patient is currently in  Ventricular paced rhythm.[id number] Score: 4. HASBLED Score: . Anticoagulation indicated. Anticoagulation done with Eliquis.        S/P CABG x 3  -home medications resumed    MI, old  -home medications resumed      CAD (coronary artery disease)  Patient with known CAD s/p CABG, which is controlled Will continue Eliquis and Statin and monitor for S/Sx of angina/ACS. Continue to monitor on telemetry.         Hyperlipemia  Patient is chronically on statin.will continue for now. Last Lipid Panel:   Lab Results   Component Value Date    CHOL 124 07/14/2022    HDL 43 07/14/2022    LDLCALC 54.4 (L) 07/14/2022    TRIG 133 07/14/2022    CHOLHDL 34.7 07/14/2022     Plan:  -Continue home medication  -low fat/low calorie diet        Hypertension  Currently normotensive. BP usually well controlled per patient with home medications.  Plan:  -Optimize pain control   -Continue home medications ( Lasix and losartan), titrate as needed   -Monitor BP  -Low salt/cardiac diet  -IV hydralazine prn for SBP>160 or DBP>90             VTE Risk Mitigation (From admission, onward)         Ordered     apixaban tablet 2.5 mg  2 times daily         06/08/23 2010     Reason for No Pharmacological VTE Prophylaxis  Once        Question:  Reasons:  Answer:  Already adequately anticoagulated on oral Anticoagulants    06/08/23 1959     IP VTE HIGH RISK PATIENT  Once         06/08/23 1959     Place sequential compression device  Until discontinued         06/08/23  1959                Discharge Planning   LITTLE:      Code Status: Full Code   Is the patient medically ready for discharge?: No    Reason for patient still in hospital (select all that apply): Patient trending condition  Discharge Plan A: Home Health                  Chano Sanz MD  Department of Hospital Medicine   [facility]

## 2023-06-12 NOTE — SUBJECTIVE & OBJECTIVE
Review of Systems   Constitutional: Positive for malaise/fatigue.   HENT: Negative.     Eyes: Negative.    Cardiovascular:  Positive for dyspnea on exertion and leg swelling.   Respiratory: Negative.     Endocrine: Negative.    Hematologic/Lymphatic: Negative.    Skin: Negative.    Musculoskeletal: Negative.    Gastrointestinal: Negative.    Genitourinary: Negative.    Neurological: Negative.    Psychiatric/Behavioral: Negative.     Allergic/Immunologic: Negative.    Objective:     Vital Signs (Most Recent):  Temp: 98.4 °F (36.9 °C) (06/12/23 1218)  Pulse: 72 (06/12/23 1218)  Resp: 19 (06/12/23 1218)  BP: 136/62 (06/12/23 1218)  SpO2: 95 % (06/12/23 1218) Vital Signs (24h Range):  Temp:  [98.1 °F (36.7 °C)-98.9 °F (37.2 °C)] 98.4 °F (36.9 °C)  Pulse:  [61-82] 72  Resp:  [18-20] 19  SpO2:  [93 %-97 %] 95 %  BP: (106-147)/(53-67) 136/62     Weight: 101.8 kg (224 lb 6.9 oz)  Body mass index is 32.2 kg/m².     SpO2: 95 %         Intake/Output Summary (Last 24 hours) at 6/12/2023 1224  Last data filed at 6/12/2023 0849  Gross per 24 hour   Intake 164.5 ml   Output 600 ml   Net -435.5 ml       Lines/Drains/Airways       Peripheral Intravenous Line  Duration                  Peripheral IV - Single Lumen 06/10/23 0255 22 G Posterior;Right Hand 2 days                       Physical Exam  Vitals and nursing note reviewed.   Constitutional:       General: He is not in acute distress.     Appearance: Normal appearance. He is well-developed. He is not diaphoretic.   HENT:      Head: Normocephalic and atraumatic.   Eyes:      General:         Right eye: No discharge.         Left eye: No discharge.      Pupils: Pupils are equal, round, and reactive to light.   Neck:      Thyroid: No thyromegaly.      Vascular: No JVD.      Trachea: No tracheal deviation.   Cardiovascular:      Rate and Rhythm: Normal rate and regular rhythm.      Heart sounds: Normal heart sounds, S1 normal and S2 normal. No murmur heard.  Pulmonary:       Effort: Pulmonary effort is normal. No respiratory distress.      Breath sounds: Normal breath sounds. No wheezing or rales.   Abdominal:      General: There is no distension.      Palpations: Abdomen is soft.      Tenderness: There is no rebound.   Musculoskeletal:      Cervical back: Neck supple.      Right lower leg: Edema present.      Left lower leg: Edema present.   Skin:     General: Skin is warm and dry.      Coloration: Skin is pale.      Findings: No erythema.   Neurological:      General: No focal deficit present.      Mental Status: He is alert and oriented to person, place, and time.   Psychiatric:         Mood and Affect: Mood normal.         Behavior: Behavior normal.         Thought Content: Thought content normal.          Significant Labs: CMP   Recent Labs   Lab 06/11/23  0524 06/12/23  0533    139   K 4.8 4.6   * 111*   CO2 18* 17*   * 111*   BUN 18 22   CREATININE 1.5* 1.7*   CALCIUM 8.8 8.7   ANIONGAP 9 11   , CBC   Recent Labs   Lab 06/12/23  1011   WBC 7.07   HGB 8.8*   HCT 29.3*      , Troponin   Recent Labs   Lab 06/11/23  0524   TROPONINI 0.036*   , and All pertinent lab results from the last 24 hours have been reviewed.    Significant Imaging: Echocardiogram: Transthoracic echo (TTE) complete (Cupid Only):   Results for orders placed or performed during the hospital encounter of 05/10/23   Echo   Result Value Ref Range    BSA 2.14 m2    LA WIDTH 4.20 cm    IVC diameter 2.87 cm    Left Ventricular Outflow Tract Mean Velocity 0.56 cm/s    Left Ventricular Outflow Tract Mean Gradient 1.55 mmHg    LVIDd 6.51 (A) 3.5 - 6.0 cm    IVS 0.96 0.6 - 1.1 cm    Posterior Wall 0.85 0.6 - 1.1 cm    Ao root annulus 3.83 cm    LVIDs 4.59 (A) 2.1 - 4.0 cm    FS 29 28 - 44 %    LA volume 132.20 cm3    STJ 3.46 cm    Ascending aorta 3.17 cm    LV mass 250.23 g    LA size 5.31 cm    RVDD 4.24 cm    TAPSE 1.90 cm    Left Ventricle Relative Wall Thickness 0.26 cm    AV mean gradient 6  mmHg    AV valve area 1.83 cm2    AV Velocity Ratio 0.58     AV index (prosthetic) 0.57     MV valve area p 1/2 method 2.75 cm2    E/A ratio 3.60     E wave deceleration time 276.19 msec    IVRT 51.38 msec    LVOT diameter 2.02 cm    LVOT area 3.2 cm2    LVOT peak wilian 0.91 m/s    LVOT peak VTI 16.70 cm    Ao peak wilian 1.57 m/s    Ao VTI 29.3 cm    RVOT peak wilian 0.77 m/s    RVOT peak VTI 14.5 cm    Mr max wilian 4.78 m/s    LVOT stroke volume 53.49 cm3    AV peak gradient 10 mmHg    PV mean gradient 1.03 mmHg    MV Peak E Wilian 1.08 m/s    TR Max Wilian 3.08 m/s    MV stenosis pressure 1/2 time 80.09 ms    MV Peak A Wilian 0.30 m/s    LV Systolic Volume 96.76 mL    LV Systolic Volume Index 46.3 mL/m2    LV Diastolic Volume 216.76 mL    LV Diastolic Volume Index 103.71 mL/m2    LA Volume Index 63.3 mL/m2    LV Mass Index 120 g/m2    RA Major Axis 7.76 cm    Left Atrium Minor Axis 6.88 cm    Left Atrium Major Axis 7.07 cm    Triscuspid Valve Regurgitation Peak Gradient 38 mmHg    EF 50 %    Narrative    · Eccentric hypertrophy and low normal systolic function.  · Moderate left atrial enlargement.  · The estimated ejection fraction is 50%.  · Indeterminate left ventricular diastolic function.  · There is abnormal septal wall motion consistent with left bundle branch   block.  · Normal right ventricular size with normal right ventricular systolic   function.  · Mild-to-moderate mitral regurgitation.  · Mild to moderate tricuspid regurgitation.      , EKG: Reviewed, and X-Ray: CXR: X-Ray Chest 1 View (CXR):   Results for orders placed or performed during the hospital encounter of 06/08/23   X-Ray Chest 1 View    Narrative    EXAMINATION:  XR CHEST 1 VIEW    CLINICAL HISTORY:  Dyspnea, unspecified    TECHNIQUE:  Single frontal view of the chest was performed.    COMPARISON:  Chest radiograph 05/29/2023    FINDINGS:  Cardiac pacer overlies the upper lateral left hemithorax with 3 leads appearing in similar position.  Spinal stimulator  overlies the midline, appearing in similar position.  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  Mild haziness at the lung bases noted.  No pneumothorax.    The cardiac silhouette is enlarged, similar to the comparison exam.  There is aortic calcification.    No acute osseous abnormality.      Impression    Cardiomegaly with small bilateral pleural effusions.  Increased hazy bibasilar opacity which may correlate with layering pleural fluid versus infiltrate or edema.      Electronically signed by: Dodie Richardson  Date:    06/08/2023  Time:    16:20    and X-Ray Chest PA and Lateral (CXR): No results found for this visit on 06/08/23.

## 2023-06-12 NOTE — PLAN OF CARE
Discussed poc with pt, pt verbalized understanding    Purposeful rounding every 2hours    VS wnl  Cardiac monitoring in use, pt is YARIEL, tele monitor #[id number]  Blood glucose monitoring   Fall precautions in place, remains injury free  Coughing under control with PRN meds  IVFs  Accurate I&Os  Abx given as prescribed  Bed locked at lowest position  Call light within reach    Chart check complete  Will cont with POC     Problem: Adult Inpatient Plan of Care  Goal: Plan of Care Review  Outcome: Ongoing, Progressing  Goal: Patient-Specific Goal (Individualized)  Outcome: Ongoing, Progressing  Goal: Absence of Hospital-Acquired Illness or Injury  Outcome: Ongoing, Progressing  Goal: Optimal Comfort and Wellbeing  Outcome: Ongoing, Progressing  Goal: Readiness for Transition of Care  Outcome: Ongoing, Progressing     Problem: Diabetes Comorbidity  Goal: Blood Glucose Level Within Targeted Range  Outcome: Ongoing, Progressing     Problem: Fluid and Electrolyte Imbalance (Acute Kidney Injury/Impairment)  Goal: Fluid and Electrolyte Balance  Outcome: Ongoing, Progressing     Problem: Oral Intake Inadequate (Acute Kidney Injury/Impairment)  Goal: Optimal Nutrition Intake  Outcome: Ongoing, Progressing     Problem: Renal Function Impairment (Acute Kidney Injury/Impairment)  Goal: Effective Renal Function  Outcome: Ongoing, Progressing     Problem: Impaired Wound Healing  Goal: Optimal Wound Healing  Outcome: Ongoing, Progressing     Problem: Fall Injury Risk  Goal: Absence of Fall and Fall-Related Injury  Outcome: Ongoing, Progressing

## 2023-06-13 NOTE — SUBJECTIVE & OBJECTIVE
Review of Systems   Constitutional: Positive for malaise/fatigue.   HENT: Negative.     Eyes: Negative.    Cardiovascular:  Positive for leg swelling.   Respiratory: Negative.     Endocrine: Negative.    Hematologic/Lymphatic: Bruises/bleeds easily.   Skin: Negative.    Musculoskeletal: Negative.    Gastrointestinal: Negative.    Genitourinary: Negative.    Neurological: Negative.    Psychiatric/Behavioral: Negative.     Allergic/Immunologic: Negative.    Objective:     Vital Signs (Most Recent):  Temp: 97.9 °F (36.6 °C) (06/13/23 0732)  Pulse: 70 (06/13/23 0920)  Resp: 18 (06/13/23 0732)  BP: (!) 109/54 (06/13/23 0732)  SpO2: (!) 94 % (06/13/23 0732) Vital Signs (24h Range):  Temp:  [97.9 °F (36.6 °C)-98.7 °F (37.1 °C)] 97.9 °F (36.6 °C)  Pulse:  [59-76] 70  Resp:  [18-20] 18  SpO2:  [94 %-97 %] 94 %  BP: (109-146)/(54-67) 109/54     Weight: 100.2 kg (220 lb 14.4 oz)  Body mass index is 31.7 kg/m².     SpO2: (!) 94 %       No intake or output data in the 24 hours ending 06/13/23 1331    Lines/Drains/Airways       None                      Physical Exam  Vitals and nursing note reviewed.   Constitutional:       General: He is not in acute distress.     Appearance: Normal appearance. He is well-developed. He is not diaphoretic.   HENT:      Head: Normocephalic and atraumatic.   Eyes:      General:         Right eye: No discharge.         Left eye: No discharge.      Pupils: Pupils are equal, round, and reactive to light.   Neck:      Thyroid: No thyromegaly.      Vascular: No JVD.      Trachea: No tracheal deviation.   Cardiovascular:      Rate and Rhythm: Normal rate and regular rhythm.      Heart sounds: Normal heart sounds, S1 normal and S2 normal. No murmur heard.     Comments: Sternotomy site well-healed  Pulmonary:      Effort: Pulmonary effort is normal. No respiratory distress.      Breath sounds: Normal breath sounds. No wheezing or rales.   Abdominal:      General: There is no distension.       Tenderness: There is no rebound.   Musculoskeletal:      Cervical back: Neck supple.      Right lower leg: Edema present.      Left lower leg: Edema present.   Skin:     General: Skin is warm and dry.      Findings: No erythema.   Neurological:      Mental Status: He is alert and oriented to person, place, and time.   Psychiatric:         Mood and Affect: Mood normal.         Behavior: Behavior normal.         Thought Content: Thought content normal.          Significant Labs: CMP   Recent Labs   Lab 06/12/23  0533 06/13/23  0648    141   K 4.6 4.2   * 113*   CO2 17* 19*   * 112*   BUN 22 25*   CREATININE 1.7* 1.7*   CALCIUM 8.7 8.7   ANIONGAP 11 9   , CBC   Recent Labs   Lab 06/12/23  1011   WBC 7.07   HGB 8.8*   HCT 29.3*      , Troponin No results for input(s): TROPONINI in the last 48 hours., and All pertinent lab results from the last 24 hours have been reviewed.    Significant Imaging: Echocardiogram: Transthoracic echo (TTE) complete (Cupid Only):   Results for orders placed or performed during the hospital encounter of 05/10/23   Echo   Result Value Ref Range    BSA 2.14 m2    LA WIDTH 4.20 cm    IVC diameter 2.87 cm    Left Ventricular Outflow Tract Mean Velocity 0.56 cm/s    Left Ventricular Outflow Tract Mean Gradient 1.55 mmHg    LVIDd 6.51 (A) 3.5 - 6.0 cm    IVS 0.96 0.6 - 1.1 cm    Posterior Wall 0.85 0.6 - 1.1 cm    Ao root annulus 3.83 cm    LVIDs 4.59 (A) 2.1 - 4.0 cm    FS 29 28 - 44 %    LA volume 132.20 cm3    STJ 3.46 cm    Ascending aorta 3.17 cm    LV mass 250.23 g    LA size 5.31 cm    RVDD 4.24 cm    TAPSE 1.90 cm    Left Ventricle Relative Wall Thickness 0.26 cm    AV mean gradient 6 mmHg    AV valve area 1.83 cm2    AV Velocity Ratio 0.58     AV index (prosthetic) 0.57     MV valve area p 1/2 method 2.75 cm2    E/A ratio 3.60     E wave deceleration time 276.19 msec    IVRT 51.38 msec    LVOT diameter 2.02 cm    LVOT area 3.2 cm2    LVOT peak jacqui 0.91 m/s    LVOT  peak VTI 16.70 cm    Ao peak wilian 1.57 m/s    Ao VTI 29.3 cm    RVOT peak wilian 0.77 m/s    RVOT peak VTI 14.5 cm    Mr max wilian 4.78 m/s    LVOT stroke volume 53.49 cm3    AV peak gradient 10 mmHg    PV mean gradient 1.03 mmHg    MV Peak E Wilian 1.08 m/s    TR Max Wilian 3.08 m/s    MV stenosis pressure 1/2 time 80.09 ms    MV Peak A Wilian 0.30 m/s    LV Systolic Volume 96.76 mL    LV Systolic Volume Index 46.3 mL/m2    LV Diastolic Volume 216.76 mL    LV Diastolic Volume Index 103.71 mL/m2    LA Volume Index 63.3 mL/m2    LV Mass Index 120 g/m2    RA Major Axis 7.76 cm    Left Atrium Minor Axis 6.88 cm    Left Atrium Major Axis 7.07 cm    Triscuspid Valve Regurgitation Peak Gradient 38 mmHg    EF 50 %    Narrative    · Eccentric hypertrophy and low normal systolic function.  · Moderate left atrial enlargement.  · The estimated ejection fraction is 50%.  · Indeterminate left ventricular diastolic function.  · There is abnormal septal wall motion consistent with left bundle branch   block.  · Normal right ventricular size with normal right ventricular systolic   function.  · Mild-to-moderate mitral regurgitation.  · Mild to moderate tricuspid regurgitation.      , EKG: Reviewed, and X-Ray: CXR: X-Ray Chest 1 View (CXR):   Results for orders placed or performed during the hospital encounter of 06/08/23   X-Ray Chest 1 View    Narrative    EXAMINATION:  XR CHEST 1 VIEW    CLINICAL HISTORY:  Dyspnea, unspecified    TECHNIQUE:  Single frontal view of the chest was performed.    COMPARISON:  Chest radiograph 05/29/2023    FINDINGS:  Cardiac pacer overlies the upper lateral left hemithorax with 3 leads appearing in similar position.  Spinal stimulator overlies the midline, appearing in similar position.  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  Mild haziness at the lung bases noted.  No  pneumothorax.    The cardiac silhouette is enlarged, similar to the comparison exam.  There is aortic calcification.    No acute osseous abnormality.      Impression    Cardiomegaly with small bilateral pleural effusions.  Increased hazy bibasilar opacity which may correlate with layering pleural fluid versus infiltrate or edema.      Electronically signed by: Dodie Richardson  Date:    06/08/2023  Time:    16:20    and X-Ray Chest PA and Lateral (CXR): No results found for this visit on 06/08/23.

## 2023-06-13 NOTE — PLAN OF CARE
Discussed poc with pt and wife, both verbalized understanding    Purposeful rounding every 2hours    VS wnl  Cardiac monitoring in use, pt is V paced, tele monitor # [id number]  Blood glucose monitoring   Fall precautions in place, remains injury free  Pt denies c/o n/v  Pain under control with PRN meds    Accurate I&Os  Abx given as prescribed  Bed locked at lowest position  Call light within reach    Chart check complete  Will cont with POC

## 2023-06-13 NOTE — PROGRESS NOTES
[facility]  Cardiology  Progress Note    Patient Name: Jake Ortiz  MRN: [id number]  Admission Date: 6/8/2023  Hospital Length of Stay: 4 days  Code Status: Full Code   Attending Physician: No att. providers found   Primary Care Physician: Byron Stevens MD  Expected Discharge Date: 6/13/2023  Principal Problem:Acute on chronic combined systolic and diastolic congestive heart failure    Subjective:   HPI:  History of Present Illness: Jake Ortiz is a 82 y.o. male patient with a PMHx of atrial fibrillation, CHF, CKD, CAD s/p CABG, DM2, HLD, HTN, MI, NASREEN, and prostate cancer who presents to the Emergency Department because he was sent by Dr. Hood (Cardiology) for IV diuresis. Currently, the pt is taking 20 mg of Lasix at home. Pt c/o generalized weakness, SOB upon exertion, and bilateral leg swelling. Pt's wife also reports that the pt has chronic diarrhea. Pt has been having 2 episodes of diarrhea per day. Symptoms are constant and moderate in severity. No mitigating or exacerbating factors reported. Patient denies any fever, chills, cough, CP, numbness, headaches, and all other sxs at this time. No further complaints or concerns at this time.   Patient seen and examined and improved CHF and edema. Plan continued diuresis for next 24 hours.    Hospital Course:   6/10/23-Patient seen and examined. Plan diuresis today.    6/11/23-Patient seen and examined. Continue diuresis.    6/12/23-Patient seen and examined today, ambulating in the hallway. Feels better, less SOB. Still with LE edema. H/H stable. Creatinine bumped to 1.7, diuretic dosage adjusted.    6/13/23-Patient seen and examined today, resting in bed. No AEON. Still with LE edema, although somewhat improved. No CP. Denies SOB. Creatinine stable at 1.7. Discussed with Dr. Harvey, will try torsemide as OP with close f/u and labs.          Review of Systems   Constitutional: Positive for malaise/fatigue.   HENT: Negative.     Eyes: Negative.     Cardiovascular:  Positive for leg swelling.   Respiratory: Negative.     Endocrine: Negative.    Hematologic/Lymphatic: Bruises/bleeds easily.   Skin: Negative.    Musculoskeletal: Negative.    Gastrointestinal: Negative.    Genitourinary: Negative.    Neurological: Negative.    Psychiatric/Behavioral: Negative.     Allergic/Immunologic: Negative.    Objective:     Vital Signs (Most Recent):  Temp: 97.9 °F (36.6 °C) (06/13/23 0732)  Pulse: 70 (06/13/23 0920)  Resp: 18 (06/13/23 0732)  BP: (!) 109/54 (06/13/23 0732)  SpO2: (!) 94 % (06/13/23 0732) Vital Signs (24h Range):  Temp:  [97.9 °F (36.6 °C)-98.7 °F (37.1 °C)] 97.9 °F (36.6 °C)  Pulse:  [59-76] 70  Resp:  [18-20] 18  SpO2:  [94 %-97 %] 94 %  BP: (109-146)/(54-67) 109/54     Weight: 100.2 kg (220 lb 14.4 oz)  Body mass index is 31.7 kg/m².     SpO2: (!) 94 %       No intake or output data in the 24 hours ending 06/13/23 1331    Lines/Drains/Airways       None                      Physical Exam  Vitals and nursing note reviewed.   Constitutional:       General: He is not in acute distress.     Appearance: Normal appearance. He is well-developed. He is not diaphoretic.   HENT:      Head: Normocephalic and atraumatic.   Eyes:      General:         Right eye: No discharge.         Left eye: No discharge.      Pupils: Pupils are equal, round, and reactive to light.   Neck:      Thyroid: No thyromegaly.      Vascular: No JVD.      Trachea: No tracheal deviation.   Cardiovascular:      Rate and Rhythm: Normal rate and regular rhythm.      Heart sounds: Normal heart sounds, S1 normal and S2 normal. No murmur heard.     Comments: Sternotomy site well-healed  Pulmonary:      Effort: Pulmonary effort is normal. No respiratory distress.      Breath sounds: Normal breath sounds. No wheezing or rales.   Abdominal:      General: There is no distension.      Tenderness: There is no rebound.   Musculoskeletal:      Cervical back: Neck supple.      Right lower leg: Edema  present.      Left lower leg: Edema present.   Skin:     General: Skin is warm and dry.      Findings: No erythema.   Neurological:      Mental Status: He is alert and oriented to person, place, and time.   Psychiatric:         Mood and Affect: Mood normal.         Behavior: Behavior normal.         Thought Content: Thought content normal.          Significant Labs: CMP   Recent Labs   Lab 06/12/23  0533 06/13/23  0648    141   K 4.6 4.2   * 113*   CO2 17* 19*   * 112*   BUN 22 25*   CREATININE 1.7* 1.7*   CALCIUM 8.7 8.7   ANIONGAP 11 9   , CBC   Recent Labs   Lab 06/12/23  1011   WBC 7.07   HGB 8.8*   HCT 29.3*      , Troponin No results for input(s): TROPONINI in the last 48 hours., and All pertinent lab results from the last 24 hours have been reviewed.    Significant Imaging: Echocardiogram: Transthoracic echo (TTE) complete (Cupid Only):   Results for orders placed or performed during the hospital encounter of 05/10/23   Echo   Result Value Ref Range    BSA 2.14 m2    LA WIDTH 4.20 cm    IVC diameter 2.87 cm    Left Ventricular Outflow Tract Mean Velocity 0.56 cm/s    Left Ventricular Outflow Tract Mean Gradient 1.55 mmHg    LVIDd 6.51 (A) 3.5 - 6.0 cm    IVS 0.96 0.6 - 1.1 cm    Posterior Wall 0.85 0.6 - 1.1 cm    Ao root annulus 3.83 cm    LVIDs 4.59 (A) 2.1 - 4.0 cm    FS 29 28 - 44 %    LA volume 132.20 cm3    STJ 3.46 cm    Ascending aorta 3.17 cm    LV mass 250.23 g    LA size 5.31 cm    RVDD 4.24 cm    TAPSE 1.90 cm    Left Ventricle Relative Wall Thickness 0.26 cm    AV mean gradient 6 mmHg    AV valve area 1.83 cm2    AV Velocity Ratio 0.58     AV index (prosthetic) 0.57     MV valve area p 1/2 method 2.75 cm2    E/A ratio 3.60     E wave deceleration time 276.19 msec    IVRT 51.38 msec    LVOT diameter 2.02 cm    LVOT area 3.2 cm2    LVOT peak jacqui 0.91 m/s    LVOT peak VTI 16.70 cm    Ao peak jacqui 1.57 m/s    Ao VTI 29.3 cm    RVOT peak jacqui 0.77 m/s    RVOT peak VTI 14.5 cm     Mr max wilian 4.78 m/s    LVOT stroke volume 53.49 cm3    AV peak gradient 10 mmHg    PV mean gradient 1.03 mmHg    MV Peak E Wilian 1.08 m/s    TR Max Wilian 3.08 m/s    MV stenosis pressure 1/2 time 80.09 ms    MV Peak A Wilian 0.30 m/s    LV Systolic Volume 96.76 mL    LV Systolic Volume Index 46.3 mL/m2    LV Diastolic Volume 216.76 mL    LV Diastolic Volume Index 103.71 mL/m2    LA Volume Index 63.3 mL/m2    LV Mass Index 120 g/m2    RA Major Axis 7.76 cm    Left Atrium Minor Axis 6.88 cm    Left Atrium Major Axis 7.07 cm    Triscuspid Valve Regurgitation Peak Gradient 38 mmHg    EF 50 %    Narrative    · Eccentric hypertrophy and low normal systolic function.  · Moderate left atrial enlargement.  · The estimated ejection fraction is 50%.  · Indeterminate left ventricular diastolic function.  · There is abnormal septal wall motion consistent with left bundle branch   block.  · Normal right ventricular size with normal right ventricular systolic   function.  · Mild-to-moderate mitral regurgitation.  · Mild to moderate tricuspid regurgitation.      , EKG: Reviewed, and X-Ray: CXR: X-Ray Chest 1 View (CXR):   Results for orders placed or performed during the hospital encounter of 06/08/23   X-Ray Chest 1 View    Narrative    EXAMINATION:  XR CHEST 1 VIEW    CLINICAL HISTORY:  Dyspnea, unspecified    TECHNIQUE:  Single frontal view of the chest was performed.    COMPARISON:  Chest radiograph 05/29/2023    FINDINGS:  Cardiac pacer overlies the upper lateral left hemithorax with 3 leads appearing in similar position.  Spinal stimulator overlies the midline, appearing in similar position.  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  Mild haziness at the lung bases noted.  No pneumothorax.    The cardiac silhouette is enlarged, similar to the comparison exam.  There is aortic  calcification.    No acute osseous abnormality.      Impression    Cardiomegaly with small bilateral pleural effusions.  Increased hazy bibasilar opacity which may correlate with layering pleural fluid versus infiltrate or edema.      Electronically signed by: Dodie Richardson  Date:    06/08/2023  Time:    16:20    and X-Ray Chest PA and Lateral (CXR): No results found for this visit on 06/08/23.    Assessment and Plan:   Patient who presents with decompensated CHF. Improved, diuretic adjusted. Will need close f/u.    * Acute on chronic combined systolic and diastolic congestive heart failure  Patient is identified as having Combined Systolic and Diastolic heart failure that is Acute on chronic. CHF is currently uncontrolled due to Continued edema of extremities. Latest ECHO performed and demonstrates- Results for orders placed during the hospital encounter of 05/10/23    Echo    Interpretation Summary  · Eccentric hypertrophy and low normal systolic function.  · Moderate left atrial enlargement.  · The estimated ejection fraction is 50%.  · Indeterminate left ventricular diastolic function.  · There is abnormal septal wall motion consistent with left bundle branch block.  · Normal right ventricular size with normal right ventricular systolic function.  · Mild-to-moderate mitral regurgitation.  · Mild to moderate tricuspid regurgitation.  . Continue ACE/ARB and Furosemide and monitor clinical status closely. Monitor on telemetry. Patient is on CHF pathway.  Monitor strict Is&Os and daily weights.  Place on fluid restriction of 2 L. Continue to stress to patient importance of self efficacy and  on diet for CHF. Last BNP reviewed- and noted below   Recent Labs   Lab 06/11/23  0814   *   .  6/10/23-continue diuresis    6/12/23  -Creatinine up-trending diuretics adjusted    6/13/23  -Will attempt trial of torsemide, f/u as OP    Localized edema  Continue diuresis    Anemia in stage 4 chronic kidney  disease  Per hospital medicine    Atrial fibrillation with chronic anticoagulation with Eliquis  Continue eliquis    S/P CABG x 3  Follow, no angina    CAD (coronary artery disease)  Stable no angina    Hyperlipemia  Continue statin    Hypertension  Continue losartan        VTE Risk Mitigation (From admission, onward)         Ordered     apixaban tablet 2.5 mg  2 times daily         06/08/23 2010     Reason for No Pharmacological VTE Prophylaxis  Once        Question:  Reasons:  Answer:  Already adequately anticoagulated on oral Anticoagulants    06/08/23 1959     IP VTE HIGH RISK PATIENT  Once         06/08/23 1959     Place sequential compression device  Until discontinued         06/08/23 1959                Anuradha Richardson PA-C  Cardiology  [facility]

## 2023-06-13 NOTE — PLAN OF CARE
[facility]  Discharge Final Note    Primary Care Provider: Byron Stevens MD    Expected Discharge Date: 6/13/2023    Final Discharge Note (most recent)       Final Note - 06/13/23 1143          Final Note    Assessment Type Final Discharge Note     Antic[place] [facility]ospital Resources/Appts/Education Provided Appointments scheduled by Navigator/Coordinator        Post-Acute Status    Post-Acute Authorization Home Health     Home Health Status Set-up Complete/Auth obtained                     Important Message from Medicare             DC Dispo: home with [facility] set up through [employer], referral faxed per protocol    PCP f/u scheduled Friday

## 2023-06-13 NOTE — ASSESSMENT & PLAN NOTE
Patient is identified as having Combined Systolic and Diastolic heart failure that is Acute on chronic. CHF is currently uncontrolled due to Continued edema of extremities. Latest ECHO performed and demonstrates- Results for orders placed during the hospital encounter of 05/10/23    Echo    Interpretation Summary  · Eccentric hypertrophy and low normal systolic function.  · Moderate left atrial enlargement.  · The estimated ejection fraction is 50%.  · Indeterminate left ventricular diastolic function.  · There is abnormal septal wall motion consistent with left bundle branch block.  · Normal right ventricular size with normal right ventricular systolic function.  · Mild-to-moderate mitral regurgitation.  · Mild to moderate tricuspid regurgitation.  . Continue ACE/ARB and Furosemide and monitor clinical status closely. Monitor on telemetry. Patient is on CHF pathway.  Monitor strict Is&Os and daily weights.  Place on fluid restriction of 2 L. Continue to stress to patient importance of self efficacy and  on diet for CHF. Last BNP reviewed- and noted below   Recent Labs   Lab 06/11/23  0814   *   .  6/10/23-continue diuresis    6/12/23  -Creatinine up-trending diuretics adjusted    6/13/23  -Will attempt trial of torsemide, f/u as OP

## 2023-06-13 NOTE — DISCHARGE SUMMARY
[facility]  Discharge Summary      Patient Name: Jake Ortiz  MRN: [id number]  [facility]: [id number]  Patient Class: IP- Inpatient  Admission Date: 6/8/2023  Hospital Length of Stay: 4 days  Discharge Date and Time:  06/13/2023 11:01 AM  Attending Physician: Chano Sanz MD   Discharging Provider: Chano Sanz MD  Primary Care Provider: Byron Stevens MD    Primary Care Team: Networked reference to record PCT     HPI:   Jake Ortiz is a 82 y.o. 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 Presented to the ED for further evaluation at the request of his cardiologist, (Dr. Hood) for IV diuresis for CHF exacerbation.  Patient reports he was just discharged from skilled nursing facility 2 days ago on 06/06/2023 after being there for roughly 1 month for generalized weakness secondary to dehydration from over diuresing and UTI.  Patient states that he still feeling generally weak and feeling more short of breath and has developed worsening bilateral lower extremity edema over the last couple of days.  Patient states that he was initially on 20 mg Lasix b.I.d., but that dosing was cut in half due to VAIBHAV which resulted in the patient being hospitalized and eventually being sent to a skilled nursing facility.  Patient states that he has been compliant with his 20 mg Lasix q.d., but states he has not been urinating and has had worsening shortness of breath and fluid retention to his bilateral lower extremities.  Patient continues to ambulate with walker and states that he gets short-winded after  walking a few feet.  Patient denies use of oxygen at home nor does he use a CPAP machine even though upon chart review showed that patient is supposed to wear CPAP q.h.s..  Patient states that he was awaiting for the machine to be approved/delivered.  Otherwise, patient denies any fevers, chills, sweats, chest pain, palpitations, dysuria, or any other symptoms at this time.    ER workup revealed H/H of 9.4/31.4 (stable with history of anemia), BUN/creatinine of 19/1.5 with EGFR 47 (previously 46/2.2 with EGFR 30 on 05/29/2023), BNP of 415, troponin of 0.032, and UA revealing >100 RBC, +3 occult blood, +1 protein.  Chest x-ray revealed cardiomegaly with small bilateral pleural effusions correlating with underlying pleural fluid versus infiltrate vs edema.  EKG revealed ventricular paced rhythm at a rate of 65 beats per minute with a QT/QTC of 432/449.  Hospital Medicine consulted to admit patient for CHF exacerbation.  Patient and wife at bedside are in agreement with treatment plan.  Patient will be placed under observation status.    PCP: Byron Stevens        * No surgery found *      Hospital Course:   Patient admitted to observation for acute on chronic combined systolic and diastolic congestive heart failure.  Imaging supports Cardiomegaly with small bilateral pleural effusions with increased hazy bibasilar opacity which may correlate with layering pleural fluid versus infiltrate or edema.  IV Lasix initiated.  Previous echo results from 05/11/2023 reviewed.  Cardiology consulted.  Troponin elevated and flat secondary to volume strain. Of note, patient Rupert recently discharged from [facility].  Current plan of care continued as previously prescribed. On 6/10/23, edema to BLE improved with pitting edema to ankles/feet and communicative dyspnea noted upon assessment. Case discussed with Cardiology and Lasix dose increased. Creatinine and H/H stable. On 6/11/23, diuresis continued. Pt  encouraged to remain compliant with fluid restriction.   6/12: diuretics being adjusted by cards. LE edema improving.   6/13: pt discharged home on po torsemide 20mg daily.        Goals of Care Treatment Preferences:  Code Status: Full Code      Consults:   Consults (From admission, onward)        Status Ordering Provider     Inpatient consult to Cardiology  Once        Provider:  Jer Rushing MD    Completed SHERIE TORREZ          No new Assessment & Plan notes have been filed under this hospital service since the last note was generated.  Service: Hospital Medicine    Final Active Diagnoses:    Diagnosis Date Noted POA    PRINCIPAL PROBLEM:  Acute on chronic combined systolic and diastolic congestive heart failure [I50.43] 03/05/2018 Yes    Localized edema [R60.0] 06/11/2023 Unknown    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 [N18.4, D63.1] 04/29/2022 Yes    Obstructive sleep apnea [G47.33] 06/05/2018 Yes    Chronic restrictive lung disease [J98.4] 03/05/2018 Yes    Atrial fibrillation with chronic anticoagulation with Eliquis [I48.91] 10/19/2017 Yes    S/P CABG x 3 [Z95.1] 07/23/2014 Not Applicable    CAD (coronary artery disease) [I25.10] 07/23/2014 Yes    MI, old [I25.2] 07/23/2014 Not Applicable    Hypertension [I10]  Yes    Hyperlipemia [E78.5]  Yes      Problems Resolved During this Admission:    Diagnosis Date Noted Date Resolved POA    Elevated troponin [R77.8] 12/23/2021 06/12/2023 Yes       Discharged Condition: good    Disposition: Home or Self Care    Follow Up:    Patient Instructions:   No discharge procedures on file.    Significant Diagnostic Studies: Labs:   BMP:   Recent Labs   Lab 06/12/23  0533 06/13/23  0648   * 112*    141   K 4.6 4.2   * 113*   CO2 17* 19*   BUN 22 25*   CREATININE 1.7* 1.7*   CALCIUM 8.7 8.7   MG 1.7 1.7    and CBC   Recent Labs   Lab 06/12/23  1011   WBC 7.07   HGB 8.8*   HCT 29.3*          Pending Diagnostic Studies:      None         Medications:  Reconciled Home Medications:      Medication List      START taking these medications    torsemide 20 MG Tab  Commonly known as: DEMADEX  Take 1 tablet (20 mg total) by mouth once daily.        CONTINUE taking these medications    acetaminophen 500 MG tablet  Commonly known as: TYLENOL  Take 2 tablets (1,000 mg total) by mouth every 6 (six) hours as needed for Pain.     apixaban 2.5 mg Tab  Commonly known as: ELIQUIS  Take 1 tablet (2.5 mg total) by mouth 2 (two) times daily.     atorvastatin 80 MG tablet  Commonly known as: LIPITOR  Take 1 tablet (80 mg total) by mouth every evening.     blood sugar diagnostic Strp  Check blood glucose levels daily in the AM fasting and 1-2 times more daily.     cholecalciferol (vitamin D3) 25 mcg (1,000 unit) capsule  Commonly known as: VITAMIN D3  Take 1,000 Units by mouth once daily.     clonazePAM 1 MG tablet  Commonly known as: KlonoPIN  Take 1 tablet (1 mg total) by mouth nightly as needed for Anxiety.     ferrous sulfate 325 mg (65 mg iron) Tab tablet  Commonly known as: FEOSOL  Take 1 tablet (325 mg total) by mouth 2 (two) times daily.     fluticasone propionate 50 mcg/actuation nasal spray  Commonly known as: FLONASE  2 sprays (100 mcg total) by Each Nostril route once daily.     L.acidophil,parac-S.therm-Bif. Cap capsule  Commonly known as: Risaquad  Take 1 capsule by mouth once daily.     lancets Misc  Check blood glucose levels daily in the AM fasting and 1-2 times more daily.     levocetirizine 5 MG tablet  Commonly known as: XYZAL  Take 1 tablet (5 mg total) by mouth every evening.     losartan 50 MG tablet  Commonly known as: COZAAR  Take 1 tablet (50 mg total) by mouth once daily.     MEGARED OMEGA-3 KRILL OIL ORAL  Take 1 capsule by mouth every morning.     multivitamin capsule  Take 1 capsule by mouth once daily. Takes once a week     OCUVITE PRESERVISION ORAL  Take 1 capsule by mouth every evening.     pantoprazole 40 MG  tablet  Commonly known as: PROTONIX  Take 1 tablet (40 mg total) by mouth once daily.     potassium chloride SA 20 MEQ tablet  Commonly known as: K-DUR,KLOR-CON  Take 20 mEq by mouth once daily.     pregabalin 75 MG capsule  Commonly known as: LYRICA  Take 1 capsule QHS x 1 week, then increase to BID (if tolerated).     pyridoxine (vitamin B6) 100 MG Tab  Commonly known as: B-6  Take 50 mg by mouth once daily.        STOP taking these medications    furosemide 20 MG tablet  Commonly known as: LASIX            Indwelling Lines/Drains at time of discharge:   Lines/Drains/Airways     None                 Time spent on the discharge of patient: 37 minutes         Chano Sanz MD  Department of Hospital Medicine  [facility] S[facility]

## 2023-06-14 NOTE — TELEPHONE ENCOUNTER
Pt calling states he was just discharged yesterday and had 2 IV attempts to his L AC and is still having pain to the site that makes it very hard to move. States that it isnt more swollen or bruised but the tylenol and ice isnt helping. Advised to CALL BACK BY PCP NURSE TODAY. verbalized understanding. Denies any further questions or concerns at this time, advised to call back if they have any that come up. Advised pt to call back with any other concerns or worsening symptoms. Verbalized understanding and will route message to provider.     Reason for Disposition   [1] Pain at IV site or shooting up arm AND [2] NO redness or swelling AND [3] IV has been removed    Additional Information   Negative: SEVERE difficulty breathing (e.g., struggling for each breath, speaks in single words)   Negative: Shock suspected (e.g., cold/pale/clammy skin, too weak to stand, low BP, rapid pulse)   Negative: Sounds like a life-threatening emergency to the triager   Negative: [1] Difficulty breathing AND [2] not severe   Negative: Arm is swollen, new-onset (or leg swelling if IV in lower extremity)   Negative: Fever > 100.4 F (38.0 C)   Negative: Patient sounds very sick or weak to the triager   Negative: Pus or cloudy fluid from IV site   Negative: [1] Red streak at IV site AND [2] palpable cord   Negative: [1] Red streak at IV site AND [2] longer than 1 inch (2.5 cm)   Negative: [1] Skin redness AND [2] extends > 1 inch (2.5 cm) from IV site   Negative: Skin swelling at IV site (Exception: IV recently removed and small area of skin swelling)   Negative: [1] Raised bruise at IV site AND [2] size > 2 inches (5 cm) AND [3] expanding   Negative: [1] Pain at IV site or shooting up arm AND [2] IV running slowly   Negative: [1] Mild bleeding at IV site AND [2] not stopped after following CARE ADVICE   Negative: [1] Dressing needs to be changed (e.g., wet, soiled, loose, or open to air) AND [2] unable or unwilling to perform dressing  change   Negative: [1] Small area of skin redness at IV site (<1 inch or 2.5 cm) AND [2] no fever, swelling   Negative: [1] Pain at IV site or shooting up arm AND [2] NO redness or swelling AND [3]  IV running normally    Protocols used: IV Site (Skin) Symptoms-A-AH

## 2023-06-14 NOTE — TELEPHONE ENCOUNTER
severe pain, warmth and discolored is 10 out of 10 to the left arm due to an IV that infiltrated in the hospital.  He cannot bend his left arm because of the severe pain.  Physicians Assistant looked at his arm prior to discharge..

## 2023-06-14 NOTE — PROGRESS NOTES
C3 nurse spoke with Jake Ortiz for a TCC post hospital discharge follow up call. The patient has a scheduled HOSFU appointment with Byron Stevens MD on 06/16/23 @ 1500.

## 2023-06-14 NOTE — TELEPHONE ENCOUNTER
Informed pt per hilario     you need to follow up with PCP or go to the urgent care for the pain he stated if he has severe pain he needs ER eval; he has appt with Samantha tomorrow here in Cardiology at [facility] for 10:00 am          Pt verbalized understanding with no questions or concerns

## 2023-06-15 NOTE — PROGRESS NOTES
[facility] Provider Note (Initial Clinic) Consult Note    Date of original referral: 6/14/2023  Age: 82 y.o.  Gender: male  Ethnicity: Not  or /a   Number of admissions for CHF within the preceding year: 1   Duration of CHF:   Type of Congestive Heart Failure: Diastolic   Etiology: Non-ischemic    Social history: none  Enrolled in Infusion suite: no    Diagnostic Labs:   EKG - 06/09/2023  CXR - 06/08/2023  ECHO - 05/11/2023  Stress test -   Stress echo -   Pharmacologic stress -   Cardiac catheterization -    Cardiac MRI -     Lab Results   Component Value Date     06/13/2023     06/12/2023    K 4.2 06/13/2023    K 4.6 06/12/2023     (H) 06/13/2023     (H) 06/12/2023    CO2 19 (L) 06/13/2023    CO2 17 (L) 06/12/2023     (H) 06/13/2023     (H) 06/12/2023    BUN 25 (H) 06/13/2023    BUN 22 06/12/2023    CREATININE 1.7 (H) 06/13/2023    CREATININE 1.7 (H) 06/12/2023    CALCIUM 8.7 06/13/2023    CALCIUM 8.7 06/12/2023    PROT 7.6 06/08/2023    PROT 7.1 05/29/2023    ALBUMIN 3.4 (L) 06/08/2023    ALBUMIN 3.0 (L) 05/29/2023    BILITOT 0.4 06/08/2023    BILITOT 0.4 05/29/2023    ALKPHOS 101 06/08/2023    ALKPHOS 78 05/29/2023    AST 31 06/08/2023    AST 32 05/29/2023    ALT 22 06/08/2023    ALT 27 05/29/2023    ANIONGAP 9 06/13/2023    ANIONGAP 11 06/12/2023    ESTGFRAFRICA 50 (A) 07/14/2022    ESTGFRAFRICA 46 (A) 07/07/2022    EGFRNONAA 43 (A) 07/14/2022    EGFRNONAA 40 (A) 07/07/2022       Lab Results   Component Value Date    WBC 7.07 06/12/2023    WBC 5.18 06/10/2023    RBC 3.02 (L) 06/12/2023    RBC 2.95 (L) 06/10/2023    HGB 8.8 (L) 06/12/2023    HGB 8.6 (L) 06/10/2023    HCT 29.3 (L) 06/12/2023    HCT 28.7 (L) 06/10/2023    MCV 97 06/12/2023    MCV 97 06/10/2023    MCH 29.1 06/12/2023    MCH 29.2 06/10/2023    MCHC 30.0 (L) 06/12/2023    MCHC 30.0 (L) 06/10/2023    RDW 17.9 (H) 06/12/2023    RDW 17.7 (H) 06/10/2023     06/12/2023     06/10/2023    MPV  10.2 06/12/2023    MPV 10.5 06/10/2023    IMMGR 0.3 06/12/2023    IMMGR 0.4 06/10/2023    IGABS 0.02 06/12/2023    IGABS 0.02 06/10/2023    LYMPH 0.5 (L) 06/12/2023    LYMPH 6.6 (L) 06/12/2023    MONO 0.8 06/12/2023    MONO 11.0 06/12/2023    EOS 0.1 06/12/2023    EOS 0.1 06/10/2023    BASO 0.04 06/12/2023    BASO 0.03 06/10/2023    NRBC 0 06/12/2023    NRBC 0 06/10/2023    GRAN 5.6 06/12/2023    GRAN 79.5 (H) 06/12/2023    EOSINOPHIL 2.0 06/12/2023    EOSINOPHIL 1.9 06/10/2023    BASOPHIL 0.6 06/12/2023    BASOPHIL 0.6 06/10/2023    PLTEST Appears normal 12/22/2021    PLTEST Appears normal 09/16/2021    ANISO Slight 12/22/2021    ANISO Slight 09/16/2021    HYPO Occasional 12/22/2021    HYPO Occasional 09/16/2021       Lab Results   Component Value Date     (H) 06/11/2023     (H) 06/08/2023    MG 1.7 06/13/2023    MG 1.7 06/12/2023    PHOS 3.4 05/03/2023    PHOS 2.5 (L) 01/22/2021    TROPONINI 0.036 (H) 06/11/2023    TROPONINI 0.040 (H) 06/08/2023    HGBA1C 6.0 (H) 06/08/2023    HGBA1C 4.9 10/14/2022    TSH 2.652 02/24/2023    TSH 1.268 11/04/2020       Lab Results   Component Value Date    IRON 11 (L) 05/12/2023    TIBC 269 05/12/2023    FERRITIN 111 05/12/2023    CHOL 124 07/14/2022    TRIG 133 07/14/2022    HDL 43 07/14/2022    LDLCALC 54.4 (L) 07/14/2022    CHOLHDL 34.7 07/14/2022    TOTALCHOLEST 2.9 07/14/2022    NONHDLCHOL 81 07/14/2022    COLORU Yellow 06/08/2023    APPEARANCEUA Clear 06/08/2023    PHUR 5.0 06/08/2023    SPECGRAV 1.015 06/08/2023    PROTEINUA 1+ (A) 06/08/2023    GLUCUA Negative 06/08/2023    KETONESU Negative 06/08/2023    BILIRUBINUA Negative 06/08/2023    OCCULTUA 3+ (A) 06/08/2023    NITRITE Negative 06/08/2023    LEUKOCYTESUR Negative 06/08/2023       List all implanted cardiac devices:     Cardiomems: no    Current Outpatient Medications on File Prior to Visit   Medication Sig Dispense Refill    acetaminophen (TYLENOL) 500 MG tablet Take 2 tablets (1,000 mg total) by mouth  every 6 (six) hours as needed for Pain.  0    apixaban (ELIQUIS) 2.5 mg Tab Take 1 tablet (2.5 mg total) by mouth 2 (two) times daily. 180 tablet 1    atorvastatin (LIPITOR) 80 MG tablet Take 1 tablet (80 mg total) by mouth every evening. 90 tablet 1    blood sugar diagnostic Strp Check blood glucose levels daily in the AM fasting and 1-2 times more daily. (Patient not taking: Reported on 6/14/2023) 300 strip 3    cholecalciferol, vitamin D3, (VITAMIN D3) 25 mcg (1,000 unit) capsule Take 1,000 Units by mouth once daily.      clonazePAM (KLONOPIN) 1 MG tablet Take 1 tablet (1 mg total) by mouth nightly as needed for Anxiety. 90 tablet 0    ferrous sulfate (FEOSOL) 325 mg (65 mg iron) Tab tablet Take 1 tablet (325 mg total) by mouth 2 (two) times daily. 60 tablet 1    fluticasone propionate (FLONASE) 50 mcg/actuation nasal spray 2 sprays (100 mcg total) by Each Nostril route once daily. 48 g 5    krill/om-3/dha/epa/phospho/ast (MEGARED OMEGA-3 KRILL OIL ORAL) Take 1 capsule by mouth every morning.      L.acidophil,parac-S.therm-Bif. (RISAQUAD) Cap capsule Take 1 capsule by mouth once daily. (Patient not taking: Reported on 6/14/2023) 30 capsule 0    lancets Misc Check blood glucose levels daily in the AM fasting and 1-2 times more daily. (Patient not taking: Reported on 6/14/2023) 300 each 3    levocetirizine (XYZAL) 5 MG tablet Take 1 tablet (5 mg total) by mouth every evening. 90 tablet 1    losartan (COZAAR) 50 MG tablet Take 1 tablet (50 mg total) by mouth once daily. 90 tablet 3    multivitamin capsule Take 1 capsule by mouth once daily. Takes once a week      pantoprazole (PROTONIX) 40 MG tablet Take 1 tablet (40 mg total) by mouth once daily. 90 tablet 3    potassium chloride SA (K-DUR,KLOR-CON) 20 MEQ tablet Take 20 mEq by mouth once daily.      pregabalin (LYRICA) 75 MG capsule Take 1 capsule QHS x 1 week, then increase to BID (if tolerated). (Patient not taking: Reported on 6/14/2023) 60 capsule 1     pyridoxine, vitamin B6, (B-6) 100 MG Tab Take 50 mg by mouth once daily.      torsemide (DEMADEX) 20 MG Tab Take 1 tablet (20 mg total) by mouth once daily. 30 tablet 11    vit A/vit C/vit E/zinc/copper (OCUVITE PRESERVISION ORAL) Take 1 capsule by mouth every evening.       Current Facility-Administered Medications on File Prior to Visit   Medication Dose Route Frequency Provider Last Rate Last Admin    ondansetron injection 4 mg  4 mg Intravenous Once PRN Tacho Trivedi MD             HPI:  Patient can walk without SOB   Patient sleeps on 2 pillows   Patient wakes up SOB, has to get out of bed, associated cough, sputum none   Palpitations - none   Dizzy, light-headed, pre-syncope or syncope none   Since discharge frequency of performing weights, home weight and weight change stable    Other information felt pertinent to HPI    Jake Ortiz is a 82 y.o. 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 Presented to the ED for further evaluation at the request of his cardiologist, (Dr. Hood) for IV diuresis for CHF exacerbation.  Patient reports he was just discharged from skilled nursing facility 2 days ago on 06/06/2023 after being there for roughly 1 month for generalized weakness secondary to dehydration from over diuresing and UTI.  Patient states that he still feeling generally weak and feeling more short of breath and has developed worsening bilateral lower extremity edema over the last couple of days.  Patient states that he was initially on 20 mg Lasix b.I.d., but that dosing was cut in half due to  VAIBHAV which resulted in the patient being hospitalized and eventually being sent to a skilled nursing facility.  Patient states that he has been compliant with his 20 mg Lasix q.d., but states he has not been urinating and has had worsening shortness of breath and fluid retention to his bilateral lower extremities.  Patient continues to ambulate with walker and states that he gets short-winded after walking a few feet.  Patient denies use of oxygen at home nor does he use a CPAP machine even though upon chart review showed that patient is supposed to wear CPAP q.h.s..  Patient states that he was awaiting for the machine to be approved/delivered.  Otherwise, patient denies any fevers, chills, sweats, chest pain, palpitations, dysuria, or any other symptoms at this time.     ER workup revealed H/H of 9.4/31.4 (stable with history of anemia), BUN/creatinine of 19/1.5 with EGFR 47 (previously 46/2.2 with EGFR 30 on 05/29/2023), BNP of 415, troponin of 0.032, and UA revealing >100 RBC, +3 occult blood, +1 protein.  Chest x-ray revealed cardiomegaly with small bilateral pleural effusions correlating with underlying pleural fluid versus infiltrate vs edema.  EKG revealed ventricular paced rhythm at a rate of 65 beats per minute with a QT/QTC of 432/449.  Hospital Medicine consulted to admit patient for CHF exacerbation.  Patient and wife at bedside are in agreement with treatment plan.  Patient will be placed under observation status.       Patient admitted to observation for acute on chronic combined systolic and diastolic congestive heart failure.  Imaging supports Cardiomegaly with small bilateral pleural effusions with increased hazy bibasilar opacity which may correlate with layering pleural fluid versus infiltrate or edema.  IV Lasix initiated.  Previous echo results from 05/11/2023 reviewed.  Cardiology consulted.  Troponin elevated and flat secondary to volume strain. Of note, patient Rupert recently discharged from  [facility].  Current plan of care continued as previously prescribed. On 6/10/23, edema to BLE improved with pitting edema to ankles/feet and communicative dyspnea noted upon assessment. Case discussed with Cardiology and Lasix dose increased. Creatinine and H/H stable. On 6/11/23, diuresis continued. Pt encouraged to remain compliant with fluid restriction.   6/12: diuretics being adjusted by cards. LE edema improving.   6/13: pt discharged home on po torsemide 20mg daily.     Main issue today is left arm pain, diagnosed By Dr Harvey inpt with phlebitis, wife states they were not given anything for pain. Just dc home yesterday    No CV complaints, only arm issues         PHYSICAL: There were no vitals filed for this visit.   Wt Readings from Last 3 Encounters:   06/12/23 100.2 kg (220 lb 14.4 oz)   06/08/23 102.3 kg (225 lb 8.5 oz)   06/08/23 102 kg (224 lb 15.7 oz)       JVD: no,    Heart rhythm: regular  Cardiac murmur: No    S3: no  S4: no  Lungs: clear  Liver span: 10 cm:   Hepatojugular reflux: no  Edema: no      ASSESSMENT: chronic diastolic HF    PLAN:      Patient Instructions:   Instruct the patient to notify this clinic if HH, a physician or an advanced care provider wants to change medication one of their HF medications   Activity and Diet restrictions:   Recommend 2-3 gram sodium restriction and 1500cc- 2000cc fluid restriction.  Encourage physical activity with graded exercise program.  Requested patient to weigh themselves daily, and to notify us if their weight increases by more than 3 lbs in 1 day or 5 lbs in 3 days.    Assigned dry weight on home scale: 100 kg  Medication changes (include current dose and changed dose)  Increase torsemide to 20 BID  Wife wants him to go to urgent care for xray  Rx for tramadol for pain. RTC 2 weeks with labs  Needs home physical therapy

## 2023-06-15 NOTE — PROGRESS NOTES
Attempted to contact the patient to confirm hospital follow up with PCP on 6/16/23, no answer, left voicemail.

## 2023-06-16 NOTE — PROGRESS NOTES
Subjective:       Patient ID: Jake Ortiz is a 82 y.o. male.    Chief Complaint: Follow-up      HPI Comments:       Current Outpatient Medications:     acetaminophen (TYLENOL) 500 MG tablet, Take 2 tablets (1,000 mg total) by mouth every 6 (six) hours as needed for Pain., Disp: , Rfl: 0    apixaban (ELIQUIS) 2.5 mg Tab, Take 1 tablet (2.5 mg total) by mouth 2 (two) times daily., Disp: 180 tablet, Rfl: 1    atorvastatin (LIPITOR) 80 MG tablet, Take 1 tablet (80 mg total) by mouth every evening., Disp: 90 tablet, Rfl: 1    blood sugar diagnostic Strp, Check blood glucose levels daily in the AM fasting and 1-2 times more daily., Disp: 300 strip, Rfl: 3    cholecalciferol, vitamin D3, (VITAMIN D3) 25 mcg (1,000 unit) capsule, Take 1,000 Units by mouth once daily., Disp: , Rfl:     ferrous sulfate (FEOSOL) 325 mg (65 mg iron) Tab tablet, Take 1 tablet (325 mg total) by mouth 2 (two) times daily., Disp: 60 tablet, Rfl: 1    fluticasone propionate (FLONASE) 50 mcg/actuation nasal spray, 2 sprays (100 mcg total) by Each Nostril route once daily., Disp: 48 g, Rfl: 5    furosemide (LASIX) 20 MG tablet, Take 20 mg by mouth 2 (two) times daily. Morning and Night, Disp: , Rfl:     krill/om-3/dha/epa/phospho/ast (MEGARED OMEGA-3 KRILL OIL ORAL), Take 1 capsule by mouth every morning., Disp: , Rfl:     L.acidophil,parac-S.therm-Bif. (RISAQUAD) Cap capsule, Take 1 capsule by mouth once daily., Disp: 30 capsule, Rfl: 0    lancets Misc, Check blood glucose levels daily in the AM fasting and 1-2 times more daily., Disp: 300 each, Rfl: 3    levocetirizine (XYZAL) 5 MG tablet, Take 1 tablet (5 mg total) by mouth every evening., Disp: 90 tablet, Rfl: 1    losartan (COZAAR) 50 MG tablet, Take 1 tablet (50 mg total) by mouth once daily., Disp: 90 tablet, Rfl: 3    multivitamin capsule, Take 1 capsule by mouth once daily. Takes once a week, Disp: , Rfl:     pantoprazole (PROTONIX) 40 MG tablet, Take 1 tablet (40 mg total) by mouth once  "daily., Disp: 90 tablet, Rfl: 3    potassium chloride SA (K-DUR,KLOR-CON) 20 MEQ tablet, Take 20 mEq by mouth once daily., Disp: , Rfl:     pyridoxine, vitamin B6, (B-6) 100 MG Tab, Take 50 mg by mouth once daily., Disp: , Rfl:     torsemide (DEMADEX) 20 MG Tab, Take 1 tablet (20 mg total) by mouth 2 (two) times a day., Disp: 60 tablet, Rfl: 11    traMADoL (ULTRAM) 50 mg tablet, Take 0.5 tablets (25 mg total) by mouth every 8 (eight) hours as needed for Pain., Disp: 14 tablet, Rfl: 0    vit A/vit C/vit E/zinc/copper (OCUVITE PRESERVISION ORAL), Take 1 capsule by mouth every evening., Disp: , Rfl:     clonazePAM (KLONOPIN) 1 MG tablet, Take 1 tablet (1 mg total) by mouth nightly as needed for Anxiety., Disp: 90 tablet, Rfl: 0    predniSONE (DELTASONE) 20 MG tablet, Take 1 tablet (20 mg total) by mouth once daily., Disp: 5 tablet, Rfl: 0  No current facility-administered medications for this visit.    Facility-Administered Medications Ordered in Other Visits:     ondansetron injection 4 mg, 4 mg, Intravenous, Once PRN, Tacho Trivedi MD      Transitional Care Note    Family and/or Caretaker present at visit?  Yes.  Diagnostic tests reviewed/disposition: No diagnosic tests pending after this hospitalization.  Disease/illness education:  Lateral epicondylitis  Home health/community services discussion/referrals: Patient does not have home health established from hospital visit.  They do not need home health.  If needed, we will set up home health for the patient.   Establishment or re-establishment of referral orders for community resources: No other necessary community resources.   Discussion with other health care providers: No discussion with other health care providers necessary.       " HPI:   Jake Ortiz is a 82 y.o. male with a PMH  has a past medical history of Abnormal PFT, Adenocarcinoma of prostate (10/17/2017), Anemia, Arthritis, Atrial fibrillation (10/19/2017), Back pain, Congestive heart failure " (03/05/2018), Coronary artery disease, DM (diabetes mellitus) (2018), DM (diabetes mellitus) (2016), Hyperlipemia, Hypertension, Myocardial infarction, NASREEN (obstructive sleep apnea) (06/05/2018), Prostate cancer (2015), Tobacco dependence, and Type 2 diabetes mellitus. Presented to the ED for further evaluation at the request of his cardiologist, (Dr. Hood) for IV diuresis for CHF exacerbation.  Patient reports he was just discharged from skilled nursing facility 2 days ago on 06/06/2023 after being there for roughly 1 month for generalized weakness secondary to dehydration from over diuresing and UTI.  Patient states that he still feeling generally weak and feeling more short of breath and has developed worsening bilateral lower extremity edema over the last couple of days.  Patient states that he was initially on 20 mg Lasix b.I.d., but that dosing was cut in half due to VAIBHAV which resulted in the patient being hospitalized and eventually being sent to a skilled nursing facility.  Patient states that he has been compliant with his 20 mg Lasix q.d., but states he has not been urinating and has had worsening shortness of breath and fluid retention to his bilateral lower extremities.  Patient continues to ambulate with walker and states that he gets short-winded after walking a few feet.  Patient denies use of oxygen at home nor does he use a CPAP machine even though upon chart review showed that patient is supposed to wear CPAP q.h.s..  Patient states that he was awaiting for the machine to be approved/delivered.  Otherwise, patient denies any fevers, chills, sweats, chest pain, palpitations, dysuria, or any other symptoms at this time.     ER workup revealed H/H of 9.4/31.4 (stable with history of anemia), BUN/creatinine of 19/1.5 with EGFR 47 (previously 46/2.2 with EGFR 30 on 05/29/2023), BNP of 415, troponin of 0.032, and UA revealing >100 RBC, +3 occult blood, +1 protein.  Chest x-ray revealed cardiomegaly with  "small bilateral pleural effusions correlating with underlying pleural fluid versus infiltrate vs edema.  EKG revealed ventricular paced rhythm at a rate of 65 beats per minute with a QT/QTC of 432/449.  Hospital Medicine consulted to admit patient for CHF exacerbation.  Patient and wife at bedside are in agreement with treatment plan.  Patient will be placed under observation status.        Hospital Course:   Patient admitted to observation for acute on chronic combined systolic and diastolic congestive heart failure.  Imaging supports Cardiomegaly with small bilateral pleural effusions with increased hazy bibasilar opacity which may correlate with layering pleural fluid versus infiltrate or edema.  IV Lasix initiated.  Previous echo results from 05/11/2023 reviewed.  Cardiology consulted.  Troponin elevated and flat secondary to volume strain. Of note, patient Rupert recently discharged from [facility].  Current plan of care continued as previously prescribed. On 6/10/23, edema to BLE improved with pitting edema to ankles/feet and communicative dyspnea noted upon assessment. Case discussed with Cardiology and Lasix dose increased. Creatinine and H/H stable. On 6/11/23, diuresis continued. Pt encouraged to remain compliant with fluid restriction.   6/12: diuretics being adjusted by cards. LE edema improving.   6/13: pt discharged home on po torsemide 20mg daily. "    He is now on b.i.d. loop diuretics.  In his breathing is better.  Swelling continues to be improved compared to pre admission    Today he presents with complaints of left elbow pain.  Started right after he left the hospital.  Thinks it has something to do with the IV site.  Was given tramadol which isn't relieving it much.  Hurts when he tries to move it.     Review of Systems   Constitutional:  Negative for activity change, appetite change and fever.   HENT:  Negative for sore throat.    Respiratory:  Positive for shortness of " "breath. Negative for cough.    Cardiovascular:  Positive for leg swelling. Negative for chest pain.   Gastrointestinal:  Negative for abdominal pain, diarrhea and nausea.   Genitourinary:  Negative for difficulty urinating.   Musculoskeletal:  Positive for arthralgias. Negative for myalgias.   Neurological:  Negative for dizziness and headaches.     Objective:      Vitals:    06/16/23 1448   BP: 134/60   Pulse: 74   Temp: (!) 100.8 °F (38.2 °C)   SpO2: (!) 94%   Weight: 97.4 kg (214 lb 11.7 oz)   Height: 5' 10" (1.778 m)   PainSc: 0-No pain     Physical Exam  Vitals and nursing note reviewed.   Constitutional:       General: He is not in acute distress.     Appearance: He is well-developed. He is not diaphoretic.      Comments: Obviously uncomfortable   HENT:      Head: Normocephalic.   Neck:      Thyroid: No thyromegaly.   Cardiovascular:      Rate and Rhythm: Normal rate and regular rhythm.      Heart sounds: Normal heart sounds. No murmur heard.  Pulmonary:      Effort: Pulmonary effort is normal.      Breath sounds: Normal breath sounds. No wheezing or rales.   Abdominal:      General: There is no distension.      Palpations: Abdomen is soft.   Musculoskeletal:        Arms:       Cervical back: Neck supple.      Comments: Exquisite tenderness at lateral epicondyle.  No swelling, fullness, erythema or warmth at the antecubital fossa   Lymphadenopathy:      Cervical: No cervical adenopathy.   Skin:     General: Skin is warm and dry.   Neurological:      Mental Status: He is alert and oriented to person, place, and time.   Psychiatric:         Behavior: Behavior normal.         Thought Content: Thought content normal.         Judgment: Judgment normal.       Assessment:       1. Lateral epicondylitis of left elbow    2. Acute on chronic combined systolic and diastolic congestive heart failure    3. Type 2 diabetes mellitus without complication, without long-term current use of insulin    4. Anemia associated with " stage 4 chronic renal failure    5. Chronic diarrhea    6. Stage 4 chronic kidney disease        Plan:   Lateral epicondylitis of left elbow  Comments:  Physical findings are localized to lateral epicondyle.  I do not believe this is an IV-related problem or phlebitis.  Prednisone for 5 days.  X-ray  Orders:  -     Cancel: X-Ray Elbow 2 Views Left; Future; Expected date: 06/16/2023  -     X-Ray Elbow Complete 3 view Left; Future; Expected date: 06/16/2023    Acute on chronic combined systolic and diastolic congestive heart failure  Comments:  Stable now.  Loop diuretics have been increased    Type 2 diabetes mellitus without complication, without long-term current use of insulin  Comments:  A1c 6.0.  Should be okay with oral steroids for few days at low-dose    Anemia associated with stage 4 chronic renal failure  Comments:  Stable    Chronic diarrhea  Comments:  Will see GI soon    Stage 4 chronic kidney disease  Comments:  Can not take NSAIDs.  Creatinine 1.5-2.2    Other orders  -     predniSONE (DELTASONE) 20 MG tablet; Take 1 tablet (20 mg total) by mouth once daily.  Dispense: 5 tablet; Refill: 0

## 2023-06-21 NOTE — PROGRESS NOTES
Subjective:      Patient ID: Jake Ortiz is a 83 y.o. male.    Chief Complaint: Diarrhea and Constipation    HPI:  Patient with history of CHF, CKD reports today for evaluation of diarrhea. Last seen by our service about 8 months ago for similar complaints. Stool studies were completed and negative. His last colonoscopy was in 2020 for GOYO. He reports the severity comes and goes. Typically has 2-3 episodes per day of mushy type stools. Denies watery stool. Takes Imodium often which helps for a day or 2 and then mushy stools return. Denies abdominal pain, blood in the stool.   Recent hospitalizations for CHF exacerbations. Was in SNF one month prior. Wife reports that his leg swelling was not addressed while at the SNF, so she brought him to the ER after following up with Cardiology upon discharge. Lasix dosage being adjusted.  Chronic anemia - most recent labs last week show that he remains anemic but stable.     Review of Systems   Constitutional:  Negative for activity change, appetite change, chills, diaphoresis, fatigue, fever and unexpected weight change.   HENT:  Negative for trouble swallowing.    Respiratory:  Positive for shortness of breath (mild). Negative for cough and stridor.    Cardiovascular:  Negative for chest pain.   Gastrointestinal:  Positive for diarrhea. Negative for abdominal distention, abdominal pain, anal bleeding, blood in stool, constipation, nausea and vomiting.   Musculoskeletal:  Positive for back pain (chronic).   Skin:  Positive for color change. Negative for pallor.   Neurological:  Negative for dizziness and weakness.   Psychiatric/Behavioral:  Negative for dysphoric mood. The patient is not nervous/anxious.      Medical History: Reviewed    Social History: Reviewed    Allergies: Reviewed    Objective:     Physical Exam  Constitutional:       General: He is not in acute distress.     Appearance: Normal appearance. He is not ill-appearing, toxic-appearing or diaphoretic.    HENT:      Head: Normocephalic and atraumatic.   Eyes:      General: No scleral icterus.     Extraocular Movements: Extraocular movements intact.   Cardiovascular:      Rate and Rhythm: Normal rate and regular rhythm.   Pulmonary:      Effort: Pulmonary effort is normal. No respiratory distress.   Abdominal:      General: Bowel sounds are normal. There is no distension.      Palpations: Abdomen is soft. There is no mass.      Tenderness: There is no abdominal tenderness. There is no guarding or rebound.      Hernia: No hernia is present.   Musculoskeletal:      Cervical back: Normal range of motion.   Skin:     General: Skin is warm and dry.      Comments: Mild yellow tint to the skin. Confirmed by wife.   Neurological:      Mental Status: He is alert and oriented to person, place, and time.       Assessment:     1. Change of skin color    2. Chronic diarrhea        Plan:     -Abdominal Xray today for further evaluation of diarrhea. Stool studies completed and negative.  -If negative Xray, can consider trial of cholestyramine vs. Colonoscopy with biopsies.   -Appears to have mild jaundice today - will repeat labs.   -No overt bleeding. Most recent labs last week show stable anemia. Will recheck today.  -Vitals WNL today.      Jake was seen today for diarrhea and constipation.    Diagnoses and all orders for this visit:    Change of skin color  -     CBC Auto Differential; Future  -     Comprehensive Metabolic Panel; Future    Chronic diarrhea  Comments:  Persistent.  Contributing certainly 2 hypokalemia.  GI follow-up scheduled  Orders:  -     Ambulatory referral/consult to Gastroenterology  -     CBC Auto Differential; Future  -     Comprehensive Metabolic Panel; Future  -     X-Ray Abdomen Flat And Erect; Future        No follow-ups on file.    Thank you for the opportunity to participate in the care of this patient.   Gaye Ramesh PA-C.

## 2023-06-22 PROBLEM — K35.32 RUPTURED APPENDIX: Status: ACTIVE | Noted: 2023-01-01

## 2023-06-22 PROBLEM — Z01.810 PREOPERATIVE CARDIOVASCULAR EXAMINATION: Status: ACTIVE | Noted: 2021-06-08

## 2023-06-22 NOTE — HPI
82 yo male with hx of prostate cancer (not on active treatment), Afib on Eliquis, CHF, and CAD s/p CABG x 3 presented with abdominal pain that has gotten progressively worse over the last 6 weeks, but it was significantly worse this morning and therefore the wife brought him into the hospital.  On CT abdomen patient was found to have a perforated appendix and general surgery was consulted.  Dr. Moody recommended IR consulted for IR drain placement. Dr. Landin recommended CT with PO/IV contrast but due to renal function IV contrast cannot be given.  Patient is NPO and continues to have abdominal tenderness and guarding.       Denies any chest pain , dyspnea, leg swelling

## 2023-06-22 NOTE — SUBJECTIVE & OBJECTIVE
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    #4 PROSTATE BIOPSY, LEFT APEX: ADENOCARCINOMA, FARHAD GRADE 3 + 3 = 6, IN 1 of 2 CORES 9/8/2014    Anemia     Arthritis     Atrial fibrillation 10/19/2017    Back pain     Congestive heart failure 03/05/2018    Coronary artery disease     DM (diabetes mellitus) 2018     am 06/23/2020    DM (diabetes mellitus) 2016     am 08/17/2021    Hyperlipemia     Hypertension     Myocardial infarction     NASREEN (obstructive sleep apnea) 06/05/2018    Prostate cancer 2015    Tobacco dependence     Type 2 diabetes mellitus        Past Surgical History:   Procedure Laterality Date    COLONOSCOPY N/A 9/22/2020    Procedure: COLONOSCOPY;  Surgeon: Javier Farmer MD;  Location: [facility] ENDO;  Service: Endoscopy;  Laterality: N/A;    CORONARY ARTERY BYPASS GRAFT  1987    EPIDURAL STEROID INJECTION N/A 11/22/2019    Procedure: Lumbar L5/S1 IL XUAN;  Surgeon: Tacho Trivedi MD;  Location: [facility] PAIN MGT;  Service: Pain Management;  Laterality: N/A;    EPIDURAL STEROID INJECTION N/A 1/6/2020    Procedure: Lumbar L5/S1 IL XUAN;  Surgeon: Tacho Trivedi MD;  Location: [facility] PAIN [facility]GT;  Service: Pain Management;  Laterality: N/A;    EPIDURAL STEROID INJECTION N/A 2/10/2020    Procedure: Caudal XUAN;  Surgeon: Tacho Trivedi MD;  Location: [facility] PAIN [facility]GT;  Service: Pain Management;  Laterality: N/A;    ESOPHAGOGASTRODUODENOSCOPY N/A 9/22/2020    Procedure: EGD (ESOPHAGOGASTRODUODENOSCOPY);  Surgeon: Javier Farmer MD;  Location: [facility] ENDO;  Service: Endoscopy;  Laterality: N/A;    INJECTION OF ANESTHETIC AGENT AROUND MEDIAL BRANCH NERVES INNERVATING LUMBAR FACET JOINT Bilateral 10/12/2020    Procedure: Bilateral L3-5 MBB;  Surgeon: Tacho Trivedi MD;  Location: [facility];  Service: Pain Management;  Laterality: Bilateral;    INJECTION OF ANESTHETIC AGENT AROUND MEDIAL BRANCH NERVES INNERVATING LUMBAR  FACET JOINT Bilateral 12/21/2020    Procedure: Bilateral L3-5 MBB with steroid;  Surgeon: Tacho Trivedi MD;  Location: [facility] PAIN MGT;  Service: Pain Management;  Laterality: Bilateral;    INSERTION OF SPINAL NEUROSTIMULATOR N/A 3/23/2023    Procedure: INSERTION, NEUROSTIMULATOR, SPINAL;  Surgeon: Philipp Demarco MD;  Location: [facility] OR;  Service: Pain Management;  Laterality: N/A;    INTRALUMINAL GASTROINTESTINAL TRACT IMAGING VIA CAPSULE N/A 12/29/2021    Procedure: IMAGING PROCEDURE, GI TRACT, INTRALUMINAL, VIA CAPSULE;  Surgeon: Tahir Geronimo RN;  Location: [facility] ENDO;  Service: Endoscopy;  Laterality: N/A;    PROSTATE SURGERY      prostate radiation    RADIOFREQUENCY THERMOCOAGULATION Left 6/4/2021    Procedure: Left L3-5 Lumbar RFA;  Surgeon: Tacho Trivedi MD;  Location: [facility] PAIN MGT;  Service: Pain Management;  Laterality: Left;    RADIOFREQUENCY THERMOCOAGULATION Right 6/18/2021    Procedure: Right L3-5 Lumbar RFA;  Surgeon: Tacho Trivedi MD;  Location: [facility] PAIN MGT;  Service: Pain Management;  Laterality: Right;    REPLACEMENT OF PACEMAKER GENERATOR Left 6/16/2022    Procedure: REPLACEMENT, PULSE GENERATOR, CARDIAC PACEMAKER/CRT-P device;  Surgeon: Darrel Carrero MD;  Location: [facility] CATH LAB;  Service: Cardiology;  Laterality: Left;  NOT MRI safe   Pacer and leads implanted 9/30/2017, Dr. Souleymane SANTACRUZ Consulta CRT-P C4TR01, [id number]   A lead: Jovan 5076 CapSureFix® Novus, [id number]   RV lead: Jovan 5076 CapSureFix® Novus, [id number]   LV lead: Jovan [id number] At    SELECTIVE INJECTION OF ANESTHETIC AGENT AROUND LUMBAR SPINAL NERVE ROOT BY TRANSFORAMINAL APPROACH Bilateral 6/8/2022    Procedure: Bilateral L3/4 TF XUAN with RN IV sedation;  Surgeon: Philipp Demarco MD;  Location: [facility] PAIN MGT;  Service: Pain Management;  Laterality: Bilateral;    SPINE SURGERY      fusion    TONSILLECTOMY      TRIAL OF SPINAL CORD NERVE STIMULATOR N/A 1/25/2023    Procedure: Trial, Neurostimulator, Spinal Cord with RN  IV sedation;  Surgeon: Philipp Demarco MD;  [facility];  Service: Pain Management;  Laterality: N/A;       Review of patient's allergies indicates:  No Known Allergies    Current Facility-Administered Medications on File Prior to Encounter   Medication    ondansetron injection 4 mg     Current Outpatient Medications on File Prior to Encounter   Medication Sig    apixaban (ELIQUIS) 2.5 mg Tab Take 1 tablet (2.5 mg total) by mouth 2 (two) times daily.    atorvastatin (LIPITOR) 80 MG tablet Take 1 tablet (80 mg total) by mouth every evening.    cholecalciferol, vitamin D3, (VITAMIN D3) 25 mcg (1,000 unit) capsule Take 1,000 Units by mouth once daily.    ferrous sulfate (FEOSOL) 325 mg (65 mg iron) Tab tablet Take 1 tablet (325 mg total) by mouth 2 (two) times daily. (Patient taking differently: Take 325 mg by mouth once daily.)    fluticasone propionate (FLONASE) 50 mcg/actuation nasal spray 2 sprays (100 mcg total) by Each Nostril route once daily.    L.acidophil,parac-S.therm-Bif. (RISAQUAD) Cap capsule Take 1 capsule by mouth once daily.    levocetirizine (XYZAL) 5 MG tablet Take 1 tablet (5 mg total) by mouth every evening.    losartan (COZAAR) 50 MG tablet Take 1 tablet (50 mg total) by mouth once daily.    multivitamin capsule Take 1 capsule by mouth once daily.    pantoprazole (PROTONIX) 40 MG tablet Take 1 tablet (40 mg total) by mouth once daily.    potassium chloride SA (K-DUR,KLOR-CON) 20 MEQ tablet Take 20 mEq by mouth once daily.    pyridoxine, vitamin B6, (B-6) 100 MG Tab Take 50 mg by mouth once daily.    vit A/vit C/vit E/zinc/copper (OCUVITE PRESERVISION ORAL) Take 1 capsule by mouth every evening.    acetaminophen (TYLENOL) 500 MG tablet Take 2 tablets (1,000 mg total) by mouth every 6 (six) hours as needed for Pain.    furosemide (LASIX) 20 MG tablet Take 20 mg by mouth once daily.    krill/om-3/dha/epa/phospho/ast (MEGARED OMEGA-3 KRILL OIL ORAL) Take 1 capsule by mouth every morning.     torsemide (DEMADEX) 20 MG Tab Take 1 tablet (20 mg total) by mouth 2 (two) times a day.    [DISCONTINUED] blood sugar diagnostic Strp Check blood glucose levels daily in the AM fasting and 1-2 times more daily.    [DISCONTINUED] clonazePAM (KLONOPIN) 1 MG tablet Take 1 tablet (1 mg total) by mouth nightly as needed for Anxiety. (Patient not taking: Reported on 2023)    [DISCONTINUED] lancets Misc Check blood glucose levels daily in the AM fasting and 1-2 times more daily.    [DISCONTINUED] predniSONE (DELTASONE) 20 MG tablet Take 1 tablet (20 mg total) by mouth once daily. (Patient not taking: Reported on 2023)    [DISCONTINUED] traMADoL (ULTRAM) 50 mg tablet Take 0.5 tablets (25 mg total) by mouth every 8 (eight) hours as needed for Pain. (Patient not taking: Reported on 2023)     Family History       Problem Relation (Age of Onset)    Cataracts Mother    Diabetes Mother    Heart attack Mother    Heart disease Mother, Father, Brother    Stroke Father          Tobacco Use    Smoking status: Former     Packs/day: 1.50     Years: 20.00     Pack years: 30.00     Types: Cigarettes     Start date:      Quit date:      Years since quittin.4    Smokeless tobacco: Never   Substance and Sexual Activity    Alcohol use: No    Drug use: No    Sexual activity: Not Currently     Review of Systems   Constitutional:  Positive for appetite change and fatigue. Negative for activity change, chills and fever.   HENT:  Negative for congestion, rhinorrhea, sore throat and tinnitus.    Respiratory:  Negative for apnea, cough, chest tightness, shortness of breath and stridor.    Cardiovascular:  Negative for chest pain, palpitations and leg swelling.   Gastrointestinal:  Positive for abdominal pain. Negative for abdominal distention, constipation, diarrhea, nausea and vomiting.   Genitourinary:  Negative for difficulty urinating, dysuria, frequency, hematuria and urgency.   Musculoskeletal:  Negative for  arthralgias, back pain, gait problem and joint swelling.   Neurological:  Negative for dizziness, seizures, weakness, light-headedness, numbness and headaches.   Psychiatric/Behavioral:  Negative for agitation, behavioral problems, confusion, decreased concentration, dysphoric mood and hallucinations.    All other systems reviewed and are negative.  Objective:     Vital Signs (Most Recent):  Temp: 99.3 °F (37.4 °C) (06/22/23 1503)  Pulse: 86 (06/22/23 1503)  Resp: 20 (06/22/23 1503)  BP: (!) 112/56 (06/22/23 1503)  SpO2: 98 % (06/22/23 1503) Vital Signs (24h Range):  Temp:  [99.3 °F (37.4 °C)-99.5 °F (37.5 °C)] 99.3 °F (37.4 °C)  Pulse:  [61-98] 86  Resp:  [18-30] 20  SpO2:  [95 %-98 %] 98 %  BP: (103-168)/() 112/56     Weight: 89.3 kg (196 lb 13.9 oz)  Body mass index is 28.25 kg/m².     Physical Exam  Vitals and nursing note reviewed.   Constitutional:       Appearance: Normal appearance.   HENT:      Head: Normocephalic and atraumatic.      Nose: Nose normal.      Mouth/Throat:      Mouth: Mucous membranes are moist.   Eyes:      Extraocular Movements: Extraocular movements intact.      Conjunctiva/sclera: Conjunctivae normal.   Cardiovascular:      Rate and Rhythm: Normal rate and regular rhythm.      Pulses: Normal pulses.      Heart sounds: Normal heart sounds.   Pulmonary:      Effort: Pulmonary effort is normal. No respiratory distress.      Breath sounds: Normal breath sounds.   Abdominal:      General: Abdomen is flat. Bowel sounds are normal. There is no distension.      Palpations: Abdomen is soft.      Tenderness: There is abdominal tenderness. There is guarding.   Musculoskeletal:         General: Normal range of motion.      Cervical back: Normal range of motion and neck supple.   Skin:     General: Skin is warm.      Capillary Refill: Capillary refill takes less than 2 seconds.   Neurological:      Mental Status: He is alert and oriented to person, place, and time. Mental status is at baseline.    Psychiatric:         Mood and Affect: Mood normal.         Behavior: Behavior normal.              Significant Labs: All pertinent labs within the past 24 hours have been reviewed.  Recent Lab Results         06/22/23  1202   06/22/23  0834   06/22/23  0749        Albumin     3.4       Alkaline Phosphatase     104       ALT     50       Anion Gap     14       Appearance, UA Clear           AST     41       Bacteria, UA Rare           Baso #     0.01       Basophil %     0.1       Bilirubin (UA) Negative           BILIRUBIN TOTAL     1.0  Comment: For infants and newborns, interpretation of results should be based  on gestational age, weight and in agreement with clinical  observations.    Premature Infant recommended reference ranges:  Up to 24 hours.............<8.0 mg/dL  Up to 48 hours............<12.0 mg/dL  3-5 days..................<15.0 mg/dL  6-29 days.................<15.0 mg/dL         BUN     32       Calcium     9.1       Chloride     101       CO2     25       Color, UA Yellow           Creatinine     1.7       Differential Method     Automated       eGFR     40       Eos #     0.0       Eosinophil %     0.2       Glucose     143       Glucose, UA Negative           Gran # (ANC)     7.7       Gran %     81.2       Hematocrit     35.8       Hemoglobin     11.0       Hyaline Casts, UA 4           Immature Grans (Abs)     0.05  Comment: Mild elevation in immature granulocytes is non specific and   can be seen in a variety of conditions including stress response,   acute inflammation, trauma and pregnancy. Correlation with other   laboratory and clinical findings is essential.         Immature Granulocytes     0.5       Ketones, UA Negative           Lactate, Kiko   2.2  Comment: Falsely low lactic acid results can be found in samples   containing >=13.0 mg/dL total bilirubin and/or >=3.5 mg/dL   direct bilirubin.           Leukocytes, UA Negative           Lipase     48       Lymph #     1.1       Lymph %      11.4       MCH     28.6       MCHC     30.7       MCV     93  Comment: Results confirmed, test repeated       Microscopic Comment SEE COMMENT  Comment: Other formed elements not mentioned in the report are not   present in the microscopic examination.              Mono #     0.6       Mono %     6.6       MPV     10.2       NITRITE UA Negative           nRBC     0       Occult Blood UA Negative           pH, UA 5.0           Platelets     350       Potassium     4.3       PROTEIN TOTAL     7.9       Protein, UA 1+  Comment: Recommend a 24 hour urine protein or a urine   protein/creatinine ratio if globulin induced proteinuria is  clinically suspected.             RBC     3.84       RBC, UA 2           RDW     17.2       Sodium     140       Specific Har[place]ld, UA 1.020           Specimen UA Urine, Clean Catch           Unclass Jemma UA Rare           UROBILINOGEN UA Negative           WBC Clumps, UA Occasional           WBC, UA 2           WBC     9.54               Significant Imaging:   CT Abdomen Pelvis  Without Contrast   Final Result      Unchanged air fluid collection adjacent to the cecum.  No oral contrast is seen within this collection.      Increasing airspace opacities within the lower lobes bilaterally suggesting atelectasis or pneumonia.      Additional findings are unchanged.         Electronically signed by: Eliot Callahan   Date:    06/22/2023   Time:    15:04      CT Renal Stone Study ABD Pelvis WO   Final Result      Extraluminal air-fluid collection in the expected location of the appendix suspicious for appendicitis with perforation and abscess formation.  There is developing mild to moderate perihepatic ascites.      Submucosal fat deposition within the colon as discussed which appears similar to the prior study.  This is nonspecific.      Absent right kidney.      Cholelithiasis.      Developing small right pleural effusion with unchanged cardiomegaly.      Report was discussed with   Abhijit at 09:35.         Electronically signed by: Eliot Callahan   Date:    06/22/2023   Time:    09:38

## 2023-06-22 NOTE — ASSESSMENT & PLAN NOTE
- General surgery and IR consulted  - CT abdomen with PO contrast: Unchanged air fluid collection adjacent to the cecum.  No oral contrast is seen within this collection. Increasing airspace opacities within the lower lobes bilaterally suggesting atelectasis or pneumonia.   - Per Dr. Moody no surgical intervention required but recommended IR  - Awaiting further recommendations by Dr. Landin based on CT finding.  - NPO  - gentle hydration  - Cardiology consulted for clearance for possible procedure given extensive cardiac history.

## 2023-06-22 NOTE — SUBJECTIVE & OBJECTIVE
Medications:  Continuous Infusions:   sodium chloride 0.9% 75 mL/hr at 06/22/23 1534     Scheduled Meds:   fluticasone propionate  2 spray Each Nostril Daily    [START ON 6/23/2023] pantoprazole  40 mg Intravenous Daily    piperacillin-tazobactam (Zosyn) IV (PEDS and ADULTS) (extended infusion is not appropriate)  4.5 g Intravenous Q8H     PRN Meds:acetaminophen, acetaminophen, hydrALAZINE, HYDROcodone-acetaminophen, morphine, ondansetron, prochlorperazine, sodium chloride 0.9%     Review of patient's allergies indicates:  No Known Allergies    Objective:     Vital Signs (Most Recent):  Temp: 99.3 °F (37.4 °C) (06/22/23 1503)  Pulse: 86 (06/22/23 1503)  Resp: 20 (06/22/23 1503)  BP: (!) 112/56 (06/22/23 1503)  SpO2: 98 % (06/22/23 1503) Vital Signs (24h Range):  Temp:  [99.3 °F (37.4 °C)-99.5 °F (37.5 °C)] 99.3 °F (37.4 °C)  Pulse:  [61-98] 86  Resp:  [18-30] 20  SpO2:  [95 %-98 %] 98 %  BP: (103-168)/() 112/56     Weight: 89.3 kg (196 lb 13.9 oz)  Body mass index is 28.25 kg/m².    Intake/Output - Last 3 Shifts         06/20 0700  06/21 0659 06/21 0700  06/22 0659 06/22 0700  06/23 0659    IV Piggyback   100    Total Intake(mL/kg)   100 (1.1)    Net   +100                    Physical Exam  Vitals and nursing note reviewed.   Constitutional:       General: He is not in acute distress.     Appearance: Normal appearance. He is not ill-appearing or toxic-appearing.   HENT:      Head: Normocephalic.      Right Ear: External ear normal.      Left Ear: External ear normal.      Nose: Nose normal.   Eyes:      Conjunctiva/sclera: Conjunctivae normal.   Cardiovascular:      Rate and Rhythm: Normal rate.   Pulmonary:      Effort: Pulmonary effort is normal. No respiratory distress.   Abdominal:      Comments: Soft, +distention, +global TTP with +guarding and +rebound   Skin:     General: Skin is warm and dry.   Neurological:      Mental Status: He is alert and oriented to person, place, and time.   Psychiatric:          Mood and Affect: Mood normal.         Behavior: Behavior normal.        Significant Labs:  I have reviewed all pertinent lab results within the past 24 hours.  CBC:   Recent Labs   Lab 06/22/23  0749   WBC 9.54   RBC 3.84*   HGB 11.0*   HCT 35.8*      MCV 93   MCH 28.6   MCHC 30.7*     CMP:   Recent Labs   Lab 06/22/23  0749   *   CALCIUM 9.1   ALBUMIN 3.4*   PROT 7.9      K 4.3   CO2 25      BUN 32*   CREATININE 1.7*   ALKPHOS 104   ALT 50*   AST 41*   BILITOT 1.0       Significant Diagnostics:  I have reviewed all pertinent imaging results/findings within the past 24 hours.    CT:  FINDINGS:  The heart is enlarged.  Lead wires noted within the right atrium, right ventricle and coronary sinus, grossly unchanged.  No visualized pericardial effusion.  Developing small right pleural effusion.  Mild atelectasis within the lower lobes bilaterally.  Developing mild to moderate ascites within the perihepatic region and right renal fossa.  The right kidney is not visualized.     The adrenal glands and left kidney are unremarkable.  No obstructive left renal calculus.  Calcification within the aorta and its branches without aneurysm.     Multiple calcified stones within the gallbladder.  The liver, spleen and pancreas appear normal.     Small umbilical hernia containing fat, mildly enlarged.  The prostate and bladder appear normal.     Submucosal fatty deposition noted within the rectum, transverse colon, ascending colon and descending colon.  Developing stranding within the fat adjacent to the cecum.  Extraluminal collection of complex fluid and air seen inferior to the cecum measuring 7.7 cm by 7.9 cm by 8.6 cm.  The appendix is not visualized.  This is in the expected location of the appendix as seen on prior exam.  Findings are suggestive of appendicitis with rupture and abscess formation.  No evidence of bowel obstruction.     Degenerative changes noted within the lumbar spine.  Electronic  epidural stimulator device noted within the soft tissues of the back extending into the thoracic spine.     Impression:     Extraluminal air-fluid collection in the expected location of the appendix suspicious for appendicitis with perforation and abscess formation.  There is developing mild to moderate perihepatic ascites.     Submucosal fat deposition within the colon as discussed which appears similar to the prior study.  This is nonspecific.     Absent right kidney.     Cholelithiasis.     Developing small right pleural effusion with unchanged cardiomegaly.

## 2023-06-22 NOTE — ASSESSMENT & PLAN NOTE
- Hx of repeated transfusions.  Currently hbg is 11.0  - Transfuse if necessary.  - Baseline cr 1.5, Currently 1.7

## 2023-06-22 NOTE — HPI
83 y.o. male with PMHx of CHF, CAD, DM, HTN who presented to the ED for generalized abdominal pain.  Describes the pain as starting around 6:00 a.m. this morning after having a bowel movement.  The pain was sharp and constant.  Presented to the ER where CT scan was shown concerning for extraluminal air fluid collection near where the appendix should be near the cecum concerning for possible perforation/abscess.  Surgery consulted for evaluation.  Per the patient and his wife, the patient has had intermittent abdominal pain and diarrhea over the past 6 weeks but the pain this morning is different than anything he is ever experienced.  Denies previous abdominal surgical history.  Is on Eliquis per Cardiology.

## 2023-06-22 NOTE — ED PROVIDER NOTES
SCRIBE #1 NOTE: ICatarino, alba scribing for, and in the presence of, Jovanni Lacey MD. I have scribed the entire note.      History      Chief Complaint   Patient presents with    Abdominal Pain     Upper abdominal pain PTA.  Per [facility], hypertensive and tachypnea       Review of patient's allergies indicates:  No Known Allergies     HPI   HPI    6/22/2023, 7:35 AM   History obtained from the patient      History of Present Illness: Jake Ortiz is a 83 y.o. male patient with a PMHx of CHF, CAD, DM, HTN who presents to the Emergency Department for generalized abdominal pain, onset this morning. Symptoms are constant and moderate in severity. No mitigating or exacerbating factors reported. No associated sxs reported. Patient denies any fever, chills, n/v/d, SOB, CP, weakness, numbness, dizziness, headache, and all other sxs at this time. Pt was given 180 mcg Fentanyl en route to the ED. Pt last ate last night. No further complaints or concerns at this time.     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    #4 PROSTATE BIOPSY, LEFT APEX: ADENOCARCINOMA, FARHAD GRADE 3 + 3 = 6, IN 1 of 2 CORES 9/8/2014    Anemia     Arthritis     Atrial fibrillation 10/19/2017    Back pain     Congestive heart failure 03/05/2018    Coronary artery disease     DM (diabetes mellitus) 2018     am 06/23/2020    DM (diabetes mellitus) 2016     am 08/17/2021    Hyperlipemia     Hypertension     Myocardial infarction     NASREEN (obstructive sleep apnea) 06/05/2018    Prostate cancer 2015    Tobacco dependence     Type 2 diabetes mellitus        Past Surgical History:  Past Surgical History:   Procedure Laterality Date    COLONOSCOPY N/A 9/22/2020    Procedure: COLONOSCOPY;  Surgeon: Javier Farmer MD;  Location: [facility];  Service: Endoscopy;  Laterality: N/A;    CORONARY ARTERY BYPASS  GRAFT  1987    EPIDURAL STEROID INJECTION N/A 11/22/2019    Procedure: Lumbar L5/S1 IL XUAN;  Surgeon: Tacho Trivedi MD;  Location: [facility] PAIN MGT;  Service: Pain Management;  Laterality: N/A;    EPIDURAL STEROID INJECTION N/A 1/6/2020    Procedure: Lumbar L5/S1 IL XUAN;  Surgeon: Tacho Trivedi MD;  Location: [facility] PAIN [facility]GT;  Service: Pain Management;  Laterality: N/A;    EPIDURAL STEROID INJECTION N/A 2/10/2020    Procedure: Caudal XUAN;  Surgeon: Tacho Trivedi MD;  Location: [facility] PAIN [facility]GT;  Service: Pain Management;  Laterality: N/A;    ESOPHAGOGASTRODUODENOSCOPY N/A 9/22/2020    Procedure: EGD (ESOPHAGOGASTRODUODENOSCOPY);  Surgeon: Javier Farmer MD;  Location: [facility] ENDO;  Service: Endoscopy;  Laterality: N/A;    INJECTION OF ANESTHETIC AGENT AROUND MEDIAL BRANCH NERVES INNERVATING LUMBAR FACET JOINT Bilateral 10/12/2020    Procedure: Bilateral L3-5 MBB;  Surgeon: Tacho Trivedi MD;  Location: [facility] PAIN [facility]GT;  Service: Pain Management;  Laterality: Bilateral;    INJECTION OF ANESTHETIC AGENT AROUND MEDIAL BRANCH NERVES INNERVATING LUMBAR FACET JOINT Bilateral 12/21/2020    Procedure: Bilateral L3-5 MBB with steroid;  Surgeon: Tacho Trivedi MD;  Location: [facility] PAIN [facility]GT;  Service: Pain Management;  Laterality: Bilateral;    INSERTION OF SPINAL NEUROSTIMULATOR N/A 3/23/2023    Procedure: INSERTION, NEUROSTIMULATOR, SPINAL;  Surgeon: Philipp Demarco MD;  Location: [facility] OR;  Service: Pain Management;  Laterality: N/A;    INTRALUMINAL GASTROINTESTINAL TRACT IMAGING VIA CAPSULE N/A 12/29/2021    Procedure: IMAGING PROCEDURE, GI TRACT, INTRALUMINAL, VIA CAPSULE;  Surgeon: Tahir Geronimo RN;  Location: [facility] ENDO;  Service: Endoscopy;  Laterality: N/A;    PROSTATE SURGERY      prostate radiation    RADIOFREQUENCY THERMOCOAGULATION Left 6/4/2021    Procedure: Left L3-5 Lumbar RFA;  Surgeon: Tacho Trivedi MD;  Location: [facility] PAIN [facility]GT;  Service: Pain Management;  Laterality: Left;    RADIOFREQUENCY  THERMOCOAGULATION Right 2021    Procedure: Right L3-5 Lumbar RFA;  Surgeon: Tacho Trivedi MD;  Location: [facility] PAIN MGT;  Service: Pain Management;  Laterality: Right;    REPLACEMENT OF PACEMAKER GENERATOR Left 2022    Procedure: REPLACEMENT, PULSE GENERATOR, CARDIAC PACEMAKER/CRT-P device;  Surgeon: Darrel Carrero MD;  Location: [facility] CATH LAB;  Service: Cardiology;  Laterality: Left;  NOT MRI safe   Pacer and leads implanted 2017, Dr. Souleymane CARROLLT Consulta CRT-P C4TR01, [id number]   A lead: Mdt 5076 CapSureFix® Novus, [id number]   RV lead: Mdt 5076 CapSureFix® Novus, [id number]   LV lead: Jovan 4196 At    SELECTIVE INJECTION OF ANESTHETIC AGENT AROUND LUMBAR SPINAL NERVE ROOT BY TRANSFORAMINAL APPROACH Bilateral 2022    Procedure: Bilateral L3/4 TF XUAN with RN IV sedation;  Surgeon: Philipp Demarco MD;  Location: [facility] PAIN MGT;  Service: Pain Management;  Laterality: Bilateral;    SPINE SURGERY      fusion    TONSILLECTOMY      TRIAL OF SPINAL CORD NERVE STIMULATOR N/A 2023    Procedure: Trial, Neurostimulator, Spinal Cord with RN IV sedation;  Surgeon: Philipp Demarco MD;  Location: [facility] PAIN MGT;  Service: Pain Management;  Laterality: N/A;         Family History:  Family History   Problem Relation Age of Onset    Heart attack Mother     Diabetes Mother     Heart disease Mother     Cataracts Mother     Stroke Father     Heart disease Father     Heart disease Brother        Social History:  Social History     Tobacco Use    Smoking status: Former     Packs/day: 1.50     Years: 20.00     Pack years: 30.00     Types: Cigarettes     Start date:      Quit date:      Years since quittin.4    Smokeless tobacco: Never   Substance and Sexual Activity    Alcohol use: No    Drug use: No    Sexual activity: Not Currently       ROS   Review of Systems   Constitutional:  Negative for chills and fever.   HENT:  Negative for sore throat.    Respiratory:  Negative for shortness of  breath.    Cardiovascular:  Negative for chest pain.   Gastrointestinal:  Positive for abdominal pain (generalized). Negative for diarrhea, nausea and vomiting.   Genitourinary:  Negative for dysuria.   Musculoskeletal:  Negative for back pain.   Skin:  Negative for rash.   Neurological:  Negative for dizziness, weakness, light-headedness, numbness and headaches.   Hematological:  Does not bruise/bleed easily.   All other systems reviewed and are negative.    Physical Exam      Initial Vitals [06/22/23 0735]   BP Pulse Resp Temp SpO2   (!) 147/111 98 (!) 30 99.5 °F (37.5 °C) 97 %      MAP       --          Physical Exam  Nursing Notes and Vital Signs Reviewed.  Constitutional: Patient is in no acute distress. Well-developed and well-nourished.  Head: Atraumatic. Normocephalic.  Eyes: PERRL. EOM intact. Conjunctivae are not pale. No scleral icterus.  ENT: Mucous membranes are moist. Oropharynx is clear and symmetric.    Neck: Supple. Full ROM.  Cardiovascular: Regular rate. Regular rhythm. No murmurs, rubs, or gallops. Distal pulses are 2+ and symmetric.  Pulmonary/Chest: No respiratory distress. Clear to auscultation bilaterally. No wheezing or rales.  Abdominal: Distended.  There is diffuse tenderness.  No rebound or guarding.  Musculoskeletal: Moves all extremities. No obvious deformities. No edema.   Skin: Warm and dry.  Neurological:  Alert, awake, and appropriate.  Normal speech.  No acute focal neurological deficits are appreciated.  Psychiatric: Normal affect. Good eye contact. Appropriate in content.    ED Course    Critical Care    Date/Time: 6/22/2023 10:06 AM  Performed by: Jovanni Lacey MD  Authorized by: Jovanni Lacey MD   Direct patient critical care time: 25 minutes  Additional history critical care time: 5 minutes  Ordering / reviewing critical care time: 15 minutes  Documentation critical care time: 10 minutes  Consulting other physicians critical care time: 5 minutes  Total critical care  time (exclusive of procedural time) : 60 minutes  Critical care time was exclusive of separately billable procedures and treating other patients and teaching time.  Critical care was necessary to treat or prevent imminent or life-threatening deterioration of the following conditions: Acute appendicits with perforation.  Critical care was time spent personally by me on the following activities: blood draw for specimens, development of treatment plan with patient or surrogate, discussions with consultants, interpretation of cardiac output measurements, evaluation of patient's response to treatment, examination of patient, obtaining history from patient or surrogate, ordering and performing treatments and interventions, ordering and review of laboratory studies, ordering and review of radiographic studies, pulse oximetry, re-evaluation of patient's condition and review of old charts.      ED Vital Signs:  Vitals:    06/22/23 0735 06/22/23 0746 06/22/23 0833 06/22/23 0836   BP: (!) 147/111  (!) 168/79    Pulse: 98  78    Resp: (!) 30   18   Temp: 99.5 °F (37.5 °C)      TempSrc: Oral      SpO2: 97%  96%    Weight:  89.3 kg (196 lb 13.9 oz)      06/22/23 0904 06/22/23 0913 06/22/23 1002 06/22/23 1127   BP: (!) 119/58 (!) 119/58 (!) 112/52    Pulse: 69 65 64    Resp:    20   Temp:       TempSrc:       SpO2: 95%  97%    Weight:        06/22/23 1130 06/22/23 1203 06/22/23 1302   BP: (!) 119/56 (!) 145/64 (!) 103/53   Pulse: 61 66 63   Resp: 20     Temp:      TempSrc:      SpO2: 97% 95% 96%   Weight:          Abnormal Lab Results:  Labs Reviewed   CBC W/ AUTO DIFFERENTIAL - Abnormal; Notable for the following components:       Result Value    RBC 3.84 (*)     Hemoglobin 11.0 (*)     Hematocrit 35.8 (*)     MCHC 30.7 (*)     RDW 17.2 (*)     Immature Grans (Abs) 0.05 (*)     Gran % 81.2 (*)     Lymph % 11.4 (*)     All other components within normal limits   COMPREHENSIVE METABOLIC PANEL - Abnormal; Notable for the following  components:    Glucose 143 (*)     BUN 32 (*)     Creatinine 1.7 (*)     Albumin 3.4 (*)     AST 41 (*)     ALT 50 (*)     eGFR 40 (*)     All other components within normal limits   URINALYSIS, REFLEX TO URINE CULTURE - Abnormal; Notable for the following components:    Protein, UA 1+ (*)     All other components within normal limits    Narrative:     Specimen Source->Urine   URINALYSIS MICROSCOPIC - Abnormal; Notable for the following components:    WBC Clumps, UA Occasional (*)     Hyaline Casts, UA 4 (*)     All other components within normal limits    Narrative:     Specimen Source->Urine   LIPASE   LACTIC ACID, PLASMA        All Lab Results:  Results for orders placed or performed during the hospital encounter of 06/22/23   CBC Auto Differential   Result Value Ref Range    WBC 9.54 3.90 - 12.70 K/uL    RBC 3.84 (L) 4.60 - 6.20 M/uL    Hemoglobin 11.0 (L) 14.0 - 18.0 g/dL    Hematocrit 35.8 (L) 40.0 - 54.0 %    MCV 93 82 - 98 fL    MCH 28.6 27.0 - 31.0 pg    MCHC 30.7 (L) 32.0 - 36.0 g/dL    RDW 17.2 (H) 11.5 - 14.5 %    Platelets 350 150 - 450 K/uL    MPV 10.2 9.2 - 12.9 fL    Immature Granulocytes 0.5 0.0 - 0.5 %    Gran # (ANC) 7.7 1.8 - 7.7 K/uL    Immature Grans (Abs) 0.05 (H) 0.00 - 0.04 K/uL    Lymph # 1.1 1.0 - 4.8 K/uL    Mono # 0.6 0.3 - 1.0 K/uL    Eos # 0.0 0.0 - 0.5 K/uL    Baso # 0.01 0.00 - 0.20 K/uL    nRBC 0 0 /100 WBC    Gran % 81.2 (H) 38.0 - 73.0 %    Lymph % 11.4 (L) 18.0 - 48.0 %    Mono % 6.6 4.0 - 15.0 %    Eosinophil % 0.2 0.0 - 8.0 %    Basophil % 0.1 0.0 - 1.9 %    Differential Method Automated    Comprehensive Metabolic Panel   Result Value Ref Range    Sodium 140 136 - 145 mmol/L    Potassium 4.3 3.5 - 5.1 mmol/L    Chloride 101 95 - 110 mmol/L    CO2 25 23 - 29 mmol/L    Glucose 143 (H) 70 - 110 mg/dL    BUN 32 (H) 8 - 23 mg/dL    Creatinine 1.7 (H) 0.5 - 1.4 mg/dL    Calcium 9.1 8.7 - 10.5 mg/dL    Total Protein 7.9 6.0 - 8.4 g/dL    Albumin 3.4 (L) 3.5 - 5.2 g/dL    Total  Bilirubin 1.0 0.1 - 1.0 mg/dL    Alkaline Phosphatase 104 55 - 135 U/L    AST 41 (H) 10 - 40 U/L    ALT 50 (H) 10 - 44 U/L    eGFR 40 (A) >60 mL/min/1.73 m^2    Anion Gap 14 8 - 16 mmol/L   Urinalysis, Reflex to Urine Culture Urine, Clean Catch    Specimen: Urine   Result Value Ref Range    Specimen UA Urine, Clean Catch     Color, UA Yellow Yellow, Straw, Beth    Appearance, UA Clear Clear    pH, UA 5.0 5.0 - 8.0    Specific Gravity, UA 1.020 1.005 - 1.030    Protein, UA 1+ (A) Negative    Glucose, UA Negative Negative    Ketones, UA Negative Negative    Bilirubin (UA) Negative Negative    Occult Blood UA Negative Negative    Nitrite, UA Negative Negative    Urobilinogen, UA Negative <2.0 EU/dL    Leukocytes, UA Negative Negative   Lipase   Result Value Ref Range    Lipase 48 4 - 60 U/L   Lactic acid, plasma   Result Value Ref Range    Lactate (Lactic Acid) 2.2 0.5 - 2.2 mmol/L   Urinalysis Microscopic   Result Value Ref Range    RBC, UA 2 0 - 4 /hpf    WBC, UA 2 0 - 5 /hpf    WBC Clumps, UA Occasional (A) None-Rare    Bacteria Rare None-Occ /hpf    Hyaline Casts, UA 4 (A) 0-1/lpf /lpf    Unclass Jemma UA Rare None-Moderate    Microscopic Comment SEE COMMENT      *Note: Due to a large number of results and/or encounters for the requested time period, some results have not been displayed. A complete set of results can be found in Results Review.     Imaging Results:  Imaging Results              CT Renal Stone Study ABD Pelvis WO (Final result)  Result time 06/22/23 09:38:27      Final result by Tima Callahan III, MD (06/22/23 09:38:27)                   Impression:      Extraluminal air-fluid collection in the expected location of the appendix suspicious for appendicitis with perforation and abscess formation.  There is developing mild to moderate perihepatic ascites.    Submucosal fat deposition within the colon as discussed which appears similar to the prior study.  This is nonspecific.    Absent right  kidney.    Cholelithiasis.    Developing small right pleural effusion with unchanged cardiomegaly.    Report was discussed with Dr. Lacey at 09:35.      Electronically signed by: Eliot Callahan  Date:    06/22/2023  Time:    09:38               Narrative:    EXAMINATION:  CT RENAL STONE STUDY ABD PELVIS WO    CLINICAL HISTORY:  Flank pain, kidney stone suspected;    TECHNIQUE:  Low dose axial images, sagittal and coronal reformations were obtained from the lung bases to the pubic symphysis.  Contrast was not administered.    COMPARISON:  CT abdomen and pelvis 07/23/2021    FINDINGS:  The heart is enlarged.  Lead wires noted within the right atrium, right ventricle and coronary sinus, grossly unchanged.  No visualized pericardial effusion.  Developing small right pleural effusion.  Mild atelectasis within the lower lobes bilaterally.  Developing mild to moderate ascites within the perihepatic region and right renal fossa.  The right kidney is not visualized.    The adrenal glands and left kidney are unremarkable.  No obstructive left renal calculus.  Calcification within the aorta and its branches without aneurysm.    Multiple calcified stones within the gallbladder.  The liver, spleen and pancreas appear normal.    Small umbilical hernia containing fat, mildly enlarged.  The prostate and bladder appear normal.    Submucosal fatty deposition noted within the rectum, transverse colon, ascending colon and descending colon.  Developing stranding within the fat adjacent to the cecum.  Extraluminal collection of complex fluid and air seen inferior to the cecum measuring 7.7 cm by 7.9 cm by 8.6 cm.  The appendix is not visualized.  This is in the expected location of the appendix as seen on prior exam.  Findings are suggestive of appendicitis with rupture and abscess formation.  No evidence of bowel obstruction.    Degenerative changes noted within the lumbar spine.  Electronic epidural stimulator device noted within  the soft tissues of the back extending into the thoracic spine.                                     The EKG was ordered, reviewed, and independently interpreted by the ED provider.  Interpretation time: 07:49  Rate: 66 BPM  Rhythm: Ventricular-paced rhythm  Interpretation: Biventricular pacemaker detected. No STEMI.           The Emergency Provider reviewed the vital signs and test results, which are outlined above.    ED Discussion     12:31 PM: Discussed pt's case with Dr. Landin (Interventional Radiology), who agrees with plan for General Surgery consult and recommends abdominal CT with IV and oral contrast.    12:58 PM: Discussed pt's case with Dr. Moody (General Surgery) who recommends admission to Hospital Medicine. Dr. Moody states that the pt's case is likely non-operative but that he will likely need Interventional Radiology drainage.    1:16 PM: Discussed case with Dr. Barnse ([facility]). Dr. Barnes agrees with current care and management of pt and accepts admission.   Admitting Service: Hospital Medicine  Admitting Physician: Dr. Barnes  Admit to: Inpatient    1:17 PM: Re-evaluated pt. I have discussed test results, shared treatment plan, and the need for admission with patient and family at bedside. Pt and family express understanding at this time and agree with all information. All questions answered. Pt and family have no further questions or concerns at this time. Pt is ready for admit.         ED Medication(s):  Medications   ondansetron injection 4 mg (4 mg Intravenous Given 6/22/23 0750)   morphine injection 4 mg (4 mg Intravenous Given 6/22/23 0836)   piperacillin-tazobactam (ZOSYN) 4.5 g in dextrose 5 % in water (D5W) 5 % 100 mL IVPB (MB+) (0 g Intravenous Stopped 6/22/23 1127)   0.9%  NaCl infusion (1,000 mLs Intravenous New Bag 6/22/23 0951)   morphine injection 4 mg (4 mg Intravenous Given 6/22/23 1127)       New Prescriptions    No medications on file       Medical Decision Making     Medical Decision Making:   Initial Assessment:   Diffuse abdominal pain that started in the middle of the night  Differential Diagnosis:   SBO, abd pain. appendicitis  Clinical Tests:   Lab Tests: Ordered and Reviewed  Radiological Study: Ordered and Reviewed  Medical Tests: Ordered and Reviewed  ED Management:  Labs and imaging reviewed by me.  No acute findings except for perforated appendicitis seen on CT.  Considered admission, patient and family are agreeable.    Other:   I discussed test(s) with the performing physician.       <> Summary of the Findings: Case discussed with Dr. Callahan (radiology) looks like perforated appendicitis  Case discussed with Dr. Moody (Surgery) recommends admission to  and IR consult  Case discussed with Dr. Landin (IR) recommends CT with PO and IV contrast to see if IR intervention is safe  Case discussed with Dr. Barnes () will admit to inpatient         Scribe Attestation:   Scribe #1: I performed the above scribed service and the documentation accurately describes the services I performed. I attest to the accuracy of the note.    Attending:   Physician Attestation Statement for Scribe #1: I, Jovanni Lacey MD, personally performed the services described in this documentation, as scribed by Catarino Campa, in my presence, and it is both accurate and complete.          Clinical Impression       ICD-10-CM ICD-9-CM   1. Perforated appendicitis  K35.32 540.0   2. Generalized abdominal pain  R10.84 789.07       Disposition:   Disposition: Admitted  Condition: Fair       Jovanni Lacey MD  06/22/23 4009

## 2023-06-22 NOTE — HPI
Mr. Ortiz is a 82 yo male with hx of prostate cancer (not on active treatment), Afib on Eliquis, CHF, and CAD s/p CABG x 3 presented with abdominal pain that has gotten progressively worse over the last 6 weeks, but it was significantly worse this morning and therefore the wife brought him into the hospital.  On CT abdomen patient was found to have a perforated appendix and general surgery was consulted.  Dr. Moody recommended IR consulted for IR drain placement. Dr. Landin recommended CT with PO/IV contrast but due to renal function IV contrast cannot be given.  Patient is NPO and continues to have abdominal tenderness and guarding. Hospital medicine will admit to inpatient for perforated appendix, follow up on IR and GS recommendaitons and IV antibiotics.

## 2023-06-22 NOTE — CONSULTS
Chart reviewed by Dr. Landin.       ASSESSMENT/PLAN:    Ruptured appendix    The order for a ct needle drainage has been placed and the procedure will be performed tomorrow.          Thank you for the consult.

## 2023-06-22 NOTE — H&P
[facility] Medicine  History & Physical    Patient Name: Jake Ortiz  MRN: [id number]  Patient Class: OP- Observation  Admission Date: 6/22/2023  Attending Physician: Evert Barnes MD   Primary Care Provider: Byron Stevens MD         Patient information was obtained from patient, spouse/SO and ER records.     Subjective:     Principal Problem:Ruptured appendix    Chief Complaint:   Chief Complaint   Patient presents with    Abdominal Pain     Upper abdominal pain PTA.  Per [facility], hypertensive and tachypnea        HPI: Mr. Ortiz is a 84 yo male with hx of prostate cancer (not on active treatment), Afib on Eliquis, CHF, and CAD s/p CABG x 3 presented with abdominal pain that has gotten progressively worse over the last 6 weeks, but it was significantly worse this morning and therefore the wife brought him into the hospital.  On CT abdomen patient was found to have a perforated appendix and general surgery was consulted.  Dr. Moody recommended IR consulted for IR drain placement. Dr. Landin recommended CT with PO/IV contrast but due to renal function IV contrast cannot be given.  Patient is NPO and continues to have abdominal tenderness and guarding. Hospital medicine will admit to inpatient for perforated appendix, follow up on IR and GS recommendaitons and IV antibiotics.          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    #4 PROSTATE BIOPSY, LEFT APEX: ADENOCARCINOMA, FARHAD GRADE 3 + 3 = 6, IN 1 of 2 CORES 9/8/2014    Anemia     Arthritis     Atrial fibrillation 10/19/2017    Back pain     Congestive heart failure 03/05/2018    Coronary artery disease     DM (diabetes mellitus) 2018     am 06/23/2020    DM (diabetes mellitus) 2016     am 08/17/2021    Hyperlipemia     Hypertension     Myocardial infarction     NASREEN (obstructive sleep apnea) 06/05/2018    Prostate cancer 2015    Tobacco dependence      Type 2 diabetes mellitus        Past Surgical History:   Procedure Laterality Date    COLONOSCOPY N/A 9/22/2020    Procedure: COLONOSCOPY;  Surgeon: Javier Farmer MD;  Location: [facility] ENDO;  Service: Endoscopy;  Laterality: N/A;    CORONARY ARTERY BYPASS GRAFT  1987    EPIDURAL STEROID INJECTION N/A 11/22/2019    Procedure: Lumbar L5/S1 IL XUAN;  Surgeon: Tacho Trivedi MD;  [facility];  Service: Pain Management;  Laterality: N/A;    EPIDURAL STEROID INJECTION N/A 1/6/2020    Procedure: Lumbar L5/S1 IL XUAN;  Surgeon: Tacho Trivedi MD;  Location: [facility];  Service: Pain Management;  Laterality: N/A;    EPIDURAL STEROID INJECTION N/A 2/10/2020    Procedure: Caudal XUAN;  Surgeon: Tacho Trivedi MD;  [facility];  Service: Pain Management;  Laterality: N/A;    ESOPHAGOGASTRODUODENOSCOPY N/A 9/22/2020    Procedure: EGD (ESOPHAGOGASTRODUODENOSCOPY);  Surgeon: Javier Farmer MD;  Location: [facility];  Service: Endoscopy;  Laterality: N/A;    INJECTION OF ANESTHETIC AGENT AROUND MEDIAL BRANCH NERVES INNERVATING LUMBAR FACET JOINT Bilateral 10/12/2020    Procedure: Bilateral L3-5 MBB;  Surgeon: Tacho Trivedi MD;  Location: [facility] PAIN MGT;  Service: Pain Management;  Laterality: Bilateral;    INJECTION OF ANESTHETIC AGENT AROUND MEDIAL BRANCH NERVES INNERVATING LUMBAR FACET JOINT Bilateral 12/21/2020    Procedure: Bilateral L3-5 MBB with steroid;  Surgeon: Tacho Trivedi MD;  Location: [facility] PAIN MGT;  Service: Pain Management;  Laterality: Bilateral;    INSERTION OF SPINAL NEUROSTIMULATOR N/A 3/23/2023    Procedure: INSERTION, NEUROSTIMULATOR, SPINAL;  Surgeon: Philipp Demarco MD;  Location: [facility] OR;  Service: Pain Management;  Laterality: N/A;    INTRALUMINAL GASTROINTESTINAL TRACT IMAGING VIA CAPSULE N/A 12/29/2021    Procedure: IMAGING PROCEDURE, GI TRACT, INTRALUMINAL, VIA CAPSULE;  Surgeon: Tahir Geronimo RN;  Location: [facility] ENDO;  Service:  Endoscopy;  Laterality: N/A;    PROSTATE SURGERY      prostate radiation    RADIOFREQUENCY THERMOCOAGULATION Left 6/4/2021    Procedure: Left L3-5 Lumbar RFA;  Surgeon: Tacho Trivedi MD;  Location: [facility];  Service: Pain Management;  Laterality: Left;    RADIOFREQUENCY THERMOCOAGULATION Right 6/18/2021    Procedure: Right L3-5 Lumbar RFA;  Surgeon: Tacho Trivedi MD;  Location: [facility] PAIN [facility]GT;  Service: Pain Management;  Laterality: Right;    REPLACEMENT OF PACEMAKER GENERATOR Left 6/16/2022    Procedure: REPLACEMENT, PULSE GENERATOR, CARDIAC PACEMAKER/CRT-P device;  Surgeon: Darrel Carrero MD;  Location: [facility] CATH LAB;  Service: Cardiology;  Laterality: Left;  NOT MRI safe   Pacer and leads implanted 9/30/2017, Dr. Souleymane CARROLLT Consulta CRT-P C4TR01, [id number]   A lead: Mdt 5076 CapSureFix® Novus, [id number]   RV lead: Mdt 5076 CapSureFix® Novus, [id number]   LV lead: Jovan 4196 At    SELECTIVE INJECTION OF ANESTHETIC AGENT AROUND LUMBAR SPINAL NERVE ROOT BY TRANSFORAMINAL APPROACH Bilateral 6/8/2022    Procedure: Bilateral L3/4 TF XUAN with RN IV sedation;  Surgeon: Philipp Demraco MD;  Location: [facility] PAIN MGT;  Service: Pain Management;  Laterality: Bilateral;    SPINE SURGERY      fusion    TONSILLECTOMY      TRIAL OF SPINAL CORD NERVE STIMULATOR N/A 1/25/2023    Procedure: Trial, Neurostimulator, Spinal Cord with RN IV sedation;  Surgeon: Philipp Demarco MD;  [facility];  Service: Pain Management;  Laterality: N/A;       Review of patient's allergies indicates:  No Known Allergies    Current Facility-Administered Medications on File Prior to Encounter   Medication    ondansetron injection 4 mg     Current Outpatient Medications on File Prior to Encounter   Medication Sig    apixaban (ELIQUIS) 2.5 mg Tab Take 1 tablet (2.5 mg total) by mouth 2 (two) times daily.    atorvastatin (LIPITOR) 80 MG tablet Take 1 tablet (80 mg total) by mouth every evening.    cholecalciferol,  vitamin D3, (VITAMIN D3) 25 mcg (1,000 unit) capsule Take 1,000 Units by mouth once daily.    ferrous sulfate (FEOSOL) 325 mg (65 mg iron) Tab tablet Take 1 tablet (325 mg total) by mouth 2 (two) times daily. (Patient taking differently: Take 325 mg by mouth once daily.)    fluticasone propionate (FLONASE) 50 mcg/actuation nasal spray 2 sprays (100 mcg total) by Each Nostril route once daily.    L.acidophil,parac-S.therm-Bif. (RISAQUAD) Cap capsule Take 1 capsule by mouth once daily.    levocetirizine (XYZAL) 5 MG tablet Take 1 tablet (5 mg total) by mouth every evening.    losartan (COZAAR) 50 MG tablet Take 1 tablet (50 mg total) by mouth once daily.    multivitamin capsule Take 1 capsule by mouth once daily.    pantoprazole (PROTONIX) 40 MG tablet Take 1 tablet (40 mg total) by mouth once daily.    potassium chloride SA (K-DUR,KLOR-CON) 20 MEQ tablet Take 20 mEq by mouth once daily.    pyridoxine, vitamin B6, (B-6) 100 MG Tab Take 50 mg by mouth once daily.    vit A/vit C/vit E/zinc/copper (OCUVITE PRESERVISION ORAL) Take 1 capsule by mouth every evening.    acetaminophen (TYLENOL) 500 MG tablet Take 2 tablets (1,000 mg total) by mouth every 6 (six) hours as needed for Pain.    furosemide (LASIX) 20 MG tablet Take 20 mg by mouth once daily.    krill/om-3/dha/epa/phospho/ast (MEGARED OMEGA-3 KRILL OIL ORAL) Take 1 capsule by mouth every morning.    torsemide (DEMADEX) 20 MG Tab Take 1 tablet (20 mg total) by mouth 2 (two) times a day.    [DISCONTINUED] blood sugar diagnostic Strp Check blood glucose levels daily in the AM fasting and 1-2 times more daily.    [DISCONTINUED] clonazePAM (KLONOPIN) 1 MG tablet Take 1 tablet (1 mg total) by mouth nightly as needed for Anxiety. (Patient not taking: Reported on 6/22/2023)    [DISCONTINUED] lancets Misc Check blood glucose levels daily in the AM fasting and 1-2 times more daily.    [DISCONTINUED] predniSONE (DELTASONE) 20 MG tablet Take 1 tablet (20 mg  total) by mouth once daily. (Patient not taking: Reported on 2023)    [DISCONTINUED] traMADoL (ULTRAM) 50 mg tablet Take 0.5 tablets (25 mg total) by mouth every 8 (eight) hours as needed for Pain. (Patient not taking: Reported on 2023)     Family History       Problem Relation (Age of Onset)    Cataracts Mother    Diabetes Mother    Heart attack Mother    Heart disease Mother, Father, Brother    Stroke Father          Tobacco Use    Smoking status: Former     Packs/day: 1.50     Years: 20.00     Pack years: 30.00     Types: Cigarettes     Start date:      Quit date:      Years since quittin.4    Smokeless tobacco: Never   Substance and Sexual Activity    Alcohol use: No    Drug use: No    Sexual activity: Not Currently     Review of Systems   Constitutional:  Positive for appetite change and fatigue. Negative for activity change, chills and fever.   HENT:  Negative for congestion, rhinorrhea, sore throat and tinnitus.    Respiratory:  Negative for apnea, cough, chest tightness, shortness of breath and stridor.    Cardiovascular:  Negative for chest pain, palpitations and leg swelling.   Gastrointestinal:  Positive for abdominal pain. Negative for abdominal distention, constipation, diarrhea, nausea and vomiting.   Genitourinary:  Negative for difficulty urinating, dysuria, frequency, hematuria and urgency.   Musculoskeletal:  Negative for arthralgias, back pain, gait problem and joint swelling.   Neurological:  Negative for dizziness, seizures, weakness, light-headedness, numbness and headaches.   Psychiatric/Behavioral:  Negative for agitation, behavioral problems, confusion, decreased concentration, dysphoric mood and hallucinations.    All other systems reviewed and are negative.  Objective:     Vital Signs (Most Recent):  Temp: 99.3 °F (37.4 °C) (23 1503)  Pulse: 86 (23 1503)  Resp: 20 (23 1503)  BP: (!) 112/56 (23 1503)  SpO2: 98 % (23 1503) Vital  Signs (24h Range):  Temp:  [99.3 °F (37.4 °C)-99.5 °F (37.5 °C)] 99.3 °F (37.4 °C)  Pulse:  [61-98] 86  Resp:  [18-30] 20  SpO2:  [95 %-98 %] 98 %  BP: (103-168)/() 112/56     Weight: 89.3 kg (196 lb 13.9 oz)  Body mass index is 28.25 kg/m².     Physical Exam  Vitals and nursing note reviewed.   Constitutional:       Appearance: Normal appearance.   HENT:      Head: Normocephalic and atraumatic.      Nose: Nose normal.      Mouth/Throat:      Mouth: Mucous membranes are moist.   Eyes:      Extraocular Movements: Extraocular movements intact.      Conjunctiva/sclera: Conjunctivae normal.   Cardiovascular:      Rate and Rhythm: Normal rate and regular rhythm.      Pulses: Normal pulses.      Heart sounds: Normal heart sounds.   Pulmonary:      Effort: Pulmonary effort is normal. No respiratory distress.      Breath sounds: Normal breath sounds.   Abdominal:      General: Abdomen is flat. Bowel sounds are normal. There is no distension.      Palpations: Abdomen is soft.      Tenderness: There is abdominal tenderness. There is guarding.   Musculoskeletal:         General: Normal range of motion.      Cervical back: Normal range of motion and neck supple.   Skin:     General: Skin is warm.      Capillary Refill: Capillary refill takes less than 2 seconds.   Neurological:      Mental Status: He is alert and oriented to person, place, and time. Mental status is at baseline.   Psychiatric:         Mood and Affect: Mood normal.         Behavior: Behavior normal.              Significant Labs: All pertinent labs within the past 24 hours have been reviewed.  Recent Lab Results         06/22/23  1202   06/22/23  0834   06/22/23  0749        Albumin     3.4       Alkaline Phosphatase     104       ALT     50       Anion Gap     14       Appearance, UA Clear           AST     41       Bacteria, UA Rare           Baso #     0.01       Basophil %     0.1       Bilirubin (UA) Negative           BILIRUBIN TOTAL      1.0  Comment: For infants and newborns, interpretation of results should be based  on gestational age, weight and in agreement with clinical  observations.    Premature Infant recommended reference ranges:  Up to 24 hours.............<8.0 mg/dL  Up to 48 hours............<12.0 mg/dL  3-5 days..................<15.0 mg/dL  6-29 days.................<15.0 mg/dL         BUN     32       Calcium     9.1       Chloride     101       CO2     25       Color, UA Yellow           Creatinine     1.7       Differential Method     Automated       eGFR     40       Eos #     0.0       Eosinophil %     0.2       Glucose     143       Glucose, UA Negative           Gran # (ANC)     7.7       Gran %     81.2       Hematocrit     35.8       Hemoglobin     11.0       Hyaline Casts, UA 4           Immature Grans (Abs)     0.05  Comment: Mild elevation in immature granulocytes is non specific and   can be seen in a variety of conditions including stress response,   acute inflammation, trauma and pregnancy. Correlation with other   laboratory and clinical findings is essential.         Immature Granulocytes     0.5       Ketones, UA Negative           Lactate, Kiko   2.2  Comment: Falsely low lactic acid results can be found in samples   containing >=13.0 mg/dL total bilirubin and/or >=3.5 mg/dL   direct bilirubin.           Leukocytes, UA Negative           Lipase     48       Lymph #     1.1       Lymph %     11.4       MCH     28.6       MCHC     30.7       MCV     93  Comment: Results confirmed, test repeated       Microscopic Comment SEE COMMENT  Comment: Other formed elements not mentioned in the report are not   present in the microscopic examination.              Mono #     0.6       Mono %     6.6       MPV     10.2       NITRITE UA Negative           nRBC     0       Occult Blood UA Negative           pH, UA 5.0           Platelets     350       Potassium     4.3       PROTEIN TOTAL     7.9       Protein, UA 1+  Comment:  Recommend a 24 hour urine protein or a urine   protein/creatinine ratio if globulin induced proteinuria is  clinically suspected.             RBC     3.84       RBC, UA 2           RDW     17.2       Sodium     140       Specific [place], UA 1.020           Specimen UA Urine, Clean Catch           Unclass Jemma UA Rare           UROBILINOGEN UA Negative           WBC Clumps, UA Occasional           WBC, UA 2           WBC     9.54               Significant Imaging:   CT Abdomen Pelvis  Without Contrast   Final Result      Unchanged air fluid collection adjacent to the cecum.  No oral contrast is seen within this collection.      Increasing airspace opacities within the lower lobes bilaterally suggesting atelectasis or pneumonia.      Additional findings are unchanged.         Electronically signed by: Eliot Callahan   Date:    06/22/2023   Time:    15:04      CT Renal Stone Study ABD Pelvis WO   Final Result      Extraluminal air-fluid collection in the expected location of the appendix suspicious for appendicitis with perforation and abscess formation.  There is developing mild to moderate perihepatic ascites.      Submucosal fat deposition within the colon as discussed which appears similar to the prior study.  This is nonspecific.      Absent right kidney.      Cholelithiasis.      Developing small right pleural effusion with unchanged cardiomegaly.      Report was discussed with Dr. Lacey at 09:35.         Electronically signed by: Eliot Callahan   Date:    06/22/2023   Time:    09:38           Assessment/Plan:     * Ruptured appendix  - General surgery and IR consulted  - CT abdomen with PO contrast: Unchanged air fluid collection adjacent to the cecum.  No oral contrast is seen within this collection. Increasing airspace opacities within the lower lobes bilaterally suggesting atelectasis or pneumonia.   - Per Dr. Moody no surgical intervention required but recommended IR  - Awaiting further  recommendations by Dr. Landin based on CT finding.  - NPO  - gentle hydration  - Cardiology consulted for clearance for possible procedure given extensive cardiac history.    Anemia associated with stage 4 chronic renal failure  - Hx of repeated transfusions.  Currently hbg is 11.0  - Transfuse if necessary.  - Baseline cr 1.5, Currently 1.7    Chronic combined systolic and diastolic congestive heart failure  Patient is identified as having Combined Systolic and Diastolic heart failure that is Chronic. CHF is currently controlled. Latest ECHO performed and demonstrates- Results for orders placed during the hospital encounter of 05/10/23    Echo    Interpretation Summary  · Eccentric hypertrophy and low normal systolic function.  · Moderate left atrial enlargement.  · The estimated ejection fraction is 50%.  · Indeterminate left ventricular diastolic function.  · There is abnormal septal wall motion consistent with left bundle branch block.  · Normal right ventricular size with normal right ventricular systolic function.  · Mild-to-moderate mitral regurgitation.  · Mild to moderate tricuspid regurgitation.  . Continue ACE/ARB and Furosemide and monitor clinical status closely. Monitor on telemetry. Patient is off CHF pathway.  Monitor strict Is&Os and daily weights.  Place on fluid restriction of 1.5 L. Continue to stress to patient importance of self efficacy and  on diet for CHF. Last BNP reviewed- and noted below No results for input(s): BNP, BNPTRIAGEBLO in the last 168 hours..      Atrial fibrillation with chronic anticoagulation with Eliquis  Patient with Permanent atrial fibrillation which is controlled currently with no medication. Patient is currently in sinus rhythm.[id number] Score: 4.. Anticoagulation indicated. Anticoagulation done with Eliquis.        S/P CABG x 3  - Patient on Eliquis - last dose 6/21 PM  - Hold Eliquis for possible procedure  - Cardiology consulted for risk  stratification.      Hyperlipemia  - resume statin on discharge      Hypertension  - Hold home cozaar due to renal function  - Hydralazine PRN for SBP >160        VTE Risk Mitigation (From admission, onward)         Ordered     IP VTE HIGH RISK PATIENT  Once         06/22/23 1405     Place sequential compression device  Until discontinued         06/22/23 1405                   On 06/22/2023, patient should be placed in hospital observation services under my care.        Evert Barnes MD  Department of Hospital Medicine  [facility] - Telemetry ([facility])

## 2023-06-22 NOTE — ASSESSMENT & PLAN NOTE
82yo F with perforation/abscess/plegmon in the RLQ concerning for perforation of appendix vs cecum vs small bowel    - Discussed with patient and family that he has peritonitis and has more expected pain and not the usual story for perforated appendicitis. Concern for more significant perforation from the cecum or small bowel or widespread contamination due to his degree of peritonitis.  Discussed that I would recommend proceeding to the operating room for diagnostic laparoscopy and possible laparotomy and possible bowel resection if needed.  They are amenable to this plan.  - to OR now for diagnostic laparoscopy, possible exploratory laparotomy and possible bowel resection  - All risks, benefits and alternatives fully explained to patient. Risks include, but are not limited to, bleeding, infection, anastomotic leak, damage to ureter, damage to other intra-abdominal organs such as colon, rectum, small bowel, stomach, liver, bladder, reproductive organs, sexual dysfunction, urinary dysfunction, postoperative abscess, conversion to open operation, perioperative MI, CVA and death.  All questions field and appropriately answered to patient's satisfaction.  Consent signed and placed on chart.  - NPO  - IV abx  - IVF

## 2023-06-22 NOTE — PHARMACY MED REC
"Admission Medication History     The home medication history was taken by Ramón Walker.    You may go to "Admission" then "Reconcile Home Medications" tabs to review and/or act upon these items.     The home medication list has been updated by the Pharmacy department.   Please read ALL comments highlighted in yellow.   Please address this information as you see fit.    Feel free to contact us if you have any questions or require assistance.      Medications listed below were obtained from: Patient/family and Analytic software- [employer]  (Not in a hospital admission)      Ramón Walker  [id number]    Current Outpatient Medications on File Prior to Encounter   Medication Sig Dispense Refill Last Dose    apixaban (ELIQUIS) 2.5 mg Tab Take 1 tablet (2.5 mg total) by mouth 2 (two) times daily. 180 tablet 1 6/21/2023    atorvastatin (LIPITOR) 80 MG tablet Take 1 tablet (80 mg total) by mouth every evening. 90 tablet 1 6/21/2023    cholecalciferol, vitamin D3, (VITAMIN D3) 25 mcg (1,000 unit) capsule Take 1,000 Units by mouth once daily.   6/21/2023    ferrous sulfate (FEOSOL) 325 mg (65 mg iron) Tab tablet Take 1 tablet (325 mg total) by mouth 2 (two) times daily. (Patient taking differently: Take 325 mg by mouth once daily.) 60 tablet 1 6/21/2023    fluticasone propionate (FLONASE) 50 mcg/actuation nasal spray 2 sprays (100 mcg total) by Each Nostril route once daily. 48 g 5 Past Week    L.acidophil,parac-S.therm-Bif. (RISAQUAD) Cap capsule Take 1 capsule by mouth once daily. 30 capsule 0 6/21/2023    levocetirizine (XYZAL) 5 MG tablet Take 1 tablet (5 mg total) by mouth every evening. 90 tablet 1 6/21/2023    losartan (COZAAR) 50 MG tablet Take 1 tablet (50 mg total) by mouth once daily. 90 tablet 3 6/21/2023    multivitamin capsule Take 1 capsule by mouth once daily.   6/21/2023    pantoprazole (PROTONIX) 40 MG tablet Take 1 tablet (40 mg total) by mouth once daily. 90 tablet 3 6/21/2023    potassium chloride " SA (K-DUR,KLOR-CON) 20 MEQ tablet Take 20 mEq by mouth once daily.   6/21/2023    pyridoxine, vitamin B6, (B-6) 100 MG Tab Take 50 mg by mouth once daily.   6/21/2023    vit A/vit C/vit E/zinc/copper (OCUVITE PRESERVISION ORAL) Take 1 capsule by mouth every evening.   6/21/2023    acetaminophen (TYLENOL) 500 MG tablet Take 2 tablets (1,000 mg total) by mouth every 6 (six) hours as needed for Pain.  0     clonazePAM (KLONOPIN) 1 MG tablet Take 1 tablet (1 mg total) by mouth nightly as needed for Anxiety. (Patient not taking: Reported on 6/22/2023) 90 tablet 0 Not Taking    furosemide (LASIX) 20 MG tablet Take 20 mg by mouth once daily.       krill/om-3/dha/epa/phospho/ast (MEGARED OMEGA-3 KRILL OIL ORAL) Take 1 capsule by mouth every morning.       predniSONE (DELTASONE) 20 MG tablet Take 1 tablet (20 mg total) by mouth once daily. (Patient not taking: Reported on 6/22/2023) 5 tablet 0 Not Taking    torsemide (DEMADEX) 20 MG Tab Take 1 tablet (20 mg total) by mouth 2 (two) times a day. 60 tablet 11     traMADoL (ULTRAM) 50 mg tablet Take 0.5 tablets (25 mg total) by mouth every 8 (eight) hours as needed for Pain. (Patient not taking: Reported on 6/22/2023) 14 tablet 0 Not Taking   .

## 2023-06-22 NOTE — PROGRESS NOTES
Pharmacist Renal Dose Adjustment Note    Jake Ortiz is a 83 y.o. male being treated with the medication zosyn    Patient Data:    Vital Signs (Most Recent):  Temp: 99.5 °F (37.5 °C) (06/22/23 0735)  Pulse: 63 (06/22/23 1302)  Resp: 20 (06/22/23 1130)  BP: (!) 103/53 (06/22/23 1302)  SpO2: 96 % (06/22/23 1302) Vital Signs (72h Range):  Temp:  [99.5 °F (37.5 °C)]   Pulse:  [61-98]   Resp:  [18-30]   BP: (103-168)/()   SpO2:  [95 %-97 %]      Recent Labs   Lab 06/21/23  1422 06/22/23  0749   CREATININE 1.7* 1.7*     Serum creatinine: 1.7 mg/dL (H) 06/22/23 0749  Estimated creatinine clearance: 38.3 mL/min (A)    Medication:zosyn 3.375g every 6 hours will be changed to zosyn 4.5g every 8 hours for crcl >20 ml/min    Pharmacist's Name: Sindy Badillo  Pharmacist's Extension: [phone]

## 2023-06-22 NOTE — CONSULTS
[facility] ([facility])  Colorectal Surgery  Consult Note    Patient Name: Jake Ortiz  MRN: [id number]  Code Status: Full Code  Admission Date: 6/22/2023  Hospital Length of Stay: 0 days  Attending Physician: Evert Barnes MD  Primary Care Provider: Byron Stevens MD    Patient information was obtained from patient, spouse/SO, past medical records and ER records.     Inpatient consult to General Surgery  Consult performed by: Victor Hugo Moody MD  Consult ordered by: Evert Barnes MD        Subjective:     Principal Problem: Ruptured appendix    History of Present Illness: 83 y.o. male with PMHx of CHF, CAD, DM, HTN who presented to the ED for generalized abdominal pain.  Describes the pain as starting around 6:00 a.m. this morning after having a bowel movement.  The pain was sharp and constant.  Presented to the ER where CT scan was shown concerning for extraluminal air fluid collection near where the appendix should be near the cecum concerning for possible perforation/abscess.  Surgery consulted for evaluation.  Per the patient and his wife, the patient has had intermittent abdominal pain and diarrhea over the past 6 weeks but the pain this morning is different than anything he is ever experienced.  Denies previous abdominal surgical history.  Is on Eliquis per Cardiology.      Medications:  Continuous Infusions:   sodium chloride 0.9% 75 mL/hr at 06/22/23 1534     Scheduled Meds:   fluticasone propionate  2 spray Each Nostril Daily    [START ON 6/23/2023] pantoprazole  40 mg Intravenous Daily    piperacillin-tazobactam (Zosyn) IV (PEDS and ADULTS) (extended infusion is not appropriate)  4.5 g Intravenous Q8H     PRN Meds:acetaminophen, acetaminophen, hydrALAZINE, HYDROcodone-acetaminophen, morphine, ondansetron, prochlorperazine, sodium chloride 0.9%     Review of patient's allergies indicates:  No Known Allergies    Objective:     Vital Signs (Most Recent):  Temp: 99.3 °F (37.4 °C) (06/22/23  1503)  Pulse: 86 (06/22/23 1503)  Resp: 20 (06/22/23 1503)  BP: (!) 112/56 (06/22/23 1503)  SpO2: 98 % (06/22/23 1503) Vital Signs (24h Range):  Temp:  [99.3 °F (37.4 °C)-99.5 °F (37.5 °C)] 99.3 °F (37.4 °C)  Pulse:  [61-98] 86  Resp:  [18-30] 20  SpO2:  [95 %-98 %] 98 %  BP: (103-168)/() 112/56     Weight: 89.3 kg (196 lb 13.9 oz)  Body mass index is 28.25 kg/m².    Intake/Output - Last 3 Shifts         06/20 0700  06/21 0659 06/21 0700  06/22 0659 06/22 0700  06/23 0659    IV Piggyback   100    Total Intake(mL/kg)   100 (1.1)    Net   +100                    Physical Exam  Vitals and nursing note reviewed.   Constitutional:       General: He is not in acute distress.     Appearance: Normal appearance. He is not ill-appearing or toxic-appearing.   HENT:      Head: Normocephalic.      Right Ear: External ear normal.      Left Ear: External ear normal.      Nose: Nose normal.   Eyes:      Conjunctiva/sclera: Conjunctivae normal.   Cardiovascular:      Rate and Rhythm: Normal rate.   Pulmonary:      Effort: Pulmonary effort is normal. No respiratory distress.   Abdominal:      Comments: Soft, +distention, +global TTP with +guarding and +rebound   Skin:     General: Skin is warm and dry.   Neurological:      Mental Status: He is alert and oriented to person, place, and time.   Psychiatric:         Mood and Affect: Mood normal.         Behavior: Behavior normal.        Significant Labs:  I have reviewed all pertinent lab results within the past 24 hours.  CBC:   Recent Labs   Lab 06/22/23  0749   WBC 9.54   RBC 3.84*   HGB 11.0*   HCT 35.8*      MCV 93   MCH 28.6   MCHC 30.7*     CMP:   Recent Labs   Lab 06/22/23  0749   *   CALCIUM 9.1   ALBUMIN 3.4*   PROT 7.9      K 4.3   CO2 25      BUN 32*   CREATININE 1.7*   ALKPHOS 104   ALT 50*   AST 41*   BILITOT 1.0       Significant Diagnostics:  I have reviewed all pertinent imaging results/findings within the past 24  hours.    CT:  FINDINGS:  The heart is enlarged.  Lead wires noted within the right atrium, right ventricle and coronary sinus, grossly unchanged.  No visualized pericardial effusion.  Developing small right pleural effusion.  Mild atelectasis within the lower lobes bilaterally.  Developing mild to moderate ascites within the perihepatic region and right renal fossa.  The right kidney is not visualized.     The adrenal glands and left kidney are unremarkable.  No obstructive left renal calculus.  Calcification within the aorta and its branches without aneurysm.     Multiple calcified stones within the gallbladder.  The liver, spleen and pancreas appear normal.     Small umbilical hernia containing fat, mildly enlarged.  The prostate and bladder appear normal.     Submucosal fatty deposition noted within the rectum, transverse colon, ascending colon and descending colon.  Developing stranding within the fat adjacent to the cecum.  Extraluminal collection of complex fluid and air seen inferior to the cecum measuring 7.7 cm by 7.9 cm by 8.6 cm.  The appendix is not visualized.  This is in the expected location of the appendix as seen on prior exam.  Findings are suggestive of appendicitis with rupture and abscess formation.  No evidence of bowel obstruction.     Degenerative changes noted within the lumbar spine.  Electronic epidural stimulator device noted within the soft tissues of the back extending into the thoracic spine.     Impression:     Extraluminal air-fluid collection in the expected location of the appendix suspicious for appendicitis with perforation and abscess formation.  There is developing mild to moderate perihepatic ascites.     Submucosal fat deposition within the colon as discussed which appears similar to the prior study.  This is nonspecific.     Absent right kidney.     Cholelithiasis.     Developing small right pleural effusion with unchanged cardiomegaly.    Assessment/Plan:     * Ruptured  appendix  82yo F with perforation/abscess/plegmon in the RLQ concerning for perforation of appendix vs cecum vs small bowel    - Discussed with patient and family that he has peritonitis and has more expected pain and not the usual story for perforated appendicitis. Concern for more significant perforation from the cecum or small bowel or widespread contamination due to his degree of peritonitis.  Discussed that I would recommend proceeding to the operating room for diagnostic laparoscopy and possible laparotomy and possible bowel resection if needed.  They are amenable to this plan.  - to OR now for diagnostic laparoscopy, possible exploratory laparotomy and possible bowel resection  - All risks, benefits and alternatives fully explained to patient. Risks include, but are not limited to, bleeding, infection, anastomotic leak, damage to ureter, damage to other intra-abdominal organs such as colon, rectum, small bowel, stomach, liver, bladder, reproductive organs, sexual dysfunction, urinary dysfunction, postoperative abscess, conversion to open operation, perioperative MI, CVA and death.  All questions field and appropriately answered to patient's satisfaction.  Consent signed and placed on chart.  - NPO  - IV abx  - IVF    Anemia associated with stage 4 chronic renal failure  Care per primary team    Chronic combined systolic and diastolic congestive heart failure  Care per primary team      Atrial fibrillation with chronic anticoagulation with Eliquis  Care per primary team    S/P CABG x 3  Care per primary team    Hyperlipemia  Care per primary team    Hypertension  Care per primary team      VTE Risk Mitigation (From admission, onward)         Ordered     IP VTE HIGH RISK PATIENT  Once         06/22/23 1405     Place sequential compression device  Until discontinued         06/22/23 1405                Thank you for your consult. I will follow-up with patient. Please contact us if you have any additional  questions.    Victor Hugo Moody MD  Colorectal Surgery  [facility] Tele[facility] ([facility])

## 2023-06-22 NOTE — SUBJECTIVE & OBJECTIVE
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    #4 PROSTATE BIOPSY, LEFT APEX: ADENOCARCINOMA, FARHAD GRADE 3 + 3 = 6, IN 1 of 2 CORES 9/8/2014    Anemia     Arthritis     Atrial fibrillation 10/19/2017    Back pain     Congestive heart failure 03/05/2018    Coronary artery disease     DM (diabetes mellitus) 2018     am 06/23/2020    DM (diabetes mellitus) 2016     am 08/17/2021    Hyperlipemia     Hypertension     Myocardial infarction     NASREEN (obstructive sleep apnea) 06/05/2018    Prostate cancer 2015    Tobacco dependence     Type 2 diabetes mellitus        Past Surgical History:   Procedure Laterality Date    COLONOSCOPY N/A 9/22/2020    Procedure: COLONOSCOPY;  Surgeon: Javier Farmer MD;  Location: [facility] ENDO;  Service: Endoscopy;  Laterality: N/A;    CORONARY ARTERY BYPASS GRAFT  1987    EPIDURAL STEROID INJECTION N/A 11/22/2019    Procedure: Lumbar L5/S1 IL XUAN;  Surgeon: Tacho Trivedi MD;  Location: [facility] PAIN [facility]GT;  Service: Pain Management;  Laterality: N/A;    EPIDURAL STEROID INJECTION N/A 1/6/2020    Procedure: Lumbar L5/S1 IL XUAN;  Surgeon: Tacho Trivedi MD;  Location: [facility] PAIN [facility]GT;  Service: Pain Management;  Laterality: N/A;    EPIDURAL STEROID INJECTION N/A 2/10/2020    Procedure: Caudal XUAN;  Surgeon: Tacho Trivedi MD;  Location: [facility] PAIN MGT;  Service: Pain Management;  Laterality: N/A;    ESOPHAGOGASTRODUODENOSCOPY N/A 9/22/2020    Procedure: EGD (ESOPHAGOGASTRODUODENOSCOPY);  Surgeon: Javier Farmer MD;  Location: [facility] ENDO;  Service: Endoscopy;  Laterality: N/A;    INJECTION OF ANESTHETIC AGENT AROUND MEDIAL BRANCH NERVES INNERVATING LUMBAR FACET JOINT Bilateral 10/12/2020    Procedure: Bilateral L3-5 MBB;  Surgeon: Tacho Trivedi MD;  Location: [facility];  Service: Pain Management;  Laterality: Bilateral;    INJECTION OF ANESTHETIC AGENT AROUND MEDIAL BRANCH NERVES INNERVATING LUMBAR  FACET JOINT Bilateral 12/21/2020    Procedure: Bilateral L3-5 MBB with steroid;  Surgeon: Tacho Trivedi MD;  Location: [facility] PAIN MGT;  Service: Pain Management;  Laterality: Bilateral;    INSERTION OF SPINAL NEUROSTIMULATOR N/A 3/23/2023    Procedure: INSERTION, NEUROSTIMULATOR, SPINAL;  Surgeon: Philipp Demarco MD;  Location: [facility] OR;  Service: Pain Management;  Laterality: N/A;    INTRALUMINAL GASTROINTESTINAL TRACT IMAGING VIA CAPSULE N/A 12/29/2021    Procedure: IMAGING PROCEDURE, GI TRACT, INTRALUMINAL, VIA CAPSULE;  Surgeon: Tahir Geronimo RN;  Location: [facility] ENDO;  Service: Endoscopy;  Laterality: N/A;    PROSTATE SURGERY      prostate radiation    RADIOFREQUENCY THERMOCOAGULATION Left 6/4/2021    Procedure: Left L3-5 Lumbar RFA;  Surgeon: Tacho Trivedi MD;  Location: [facility] PAIN MGT;  Service: Pain Management;  Laterality: Left;    RADIOFREQUENCY THERMOCOAGULATION Right 6/18/2021    Procedure: Right L3-5 Lumbar RFA;  Surgeon: Tacho Trivedi MD;  Location: [facility] PAIN MGT;  Service: Pain Management;  Laterality: Right;    REPLACEMENT OF PACEMAKER GENERATOR Left 6/16/2022    Procedure: REPLACEMENT, PULSE GENERATOR, CARDIAC PACEMAKER/CRT-P device;  Surgeon: Darrel Carrero MD;  Location: [facility] CATH LAB;  Service: Cardiology;  Laterality: Left;  NOT MRI safe   Pacer and leads implanted 9/30/2017, Dr. Souleymane SANTACRUZ Consulta CRT-P C4TR01, [id number]   A lead: Jovan 5076 CapSureFix® Novus, [id number]   RV lead: Jovan 5076 CapSureFix® Novus, [id number]   LV lead: Jovan [id number] At    SELECTIVE INJECTION OF ANESTHETIC AGENT AROUND LUMBAR SPINAL NERVE ROOT BY TRANSFORAMINAL APPROACH Bilateral 6/8/2022    Procedure: Bilateral L3/4 TF XUAN with RN IV sedation;  Surgeon: Philipp Demarco MD;  Location: [facility] PAIN MGT;  Service: Pain Management;  Laterality: Bilateral;    SPINE SURGERY      fusion    TONSILLECTOMY      TRIAL OF SPINAL CORD NERVE STIMULATOR N/A 1/25/2023    Procedure: Trial, Neurostimulator, Spinal Cord with RN  IV sedation;  Surgeon: Philipp Demarco MD;  [facility];  Service: Pain Management;  Laterality: N/A;       Review of patient's allergies indicates:  No Known Allergies    Current Facility-Administered Medications on File Prior to Encounter   Medication    ondansetron injection 4 mg     Current Outpatient Medications on File Prior to Encounter   Medication Sig    apixaban (ELIQUIS) 2.5 mg Tab Take 1 tablet (2.5 mg total) by mouth 2 (two) times daily.    atorvastatin (LIPITOR) 80 MG tablet Take 1 tablet (80 mg total) by mouth every evening.    cholecalciferol, vitamin D3, (VITAMIN D3) 25 mcg (1,000 unit) capsule Take 1,000 Units by mouth once daily.    ferrous sulfate (FEOSOL) 325 mg (65 mg iron) Tab tablet Take 1 tablet (325 mg total) by mouth 2 (two) times daily. (Patient taking differently: Take 325 mg by mouth once daily.)    fluticasone propionate (FLONASE) 50 mcg/actuation nasal spray 2 sprays (100 mcg total) by Each Nostril route once daily.    L.acidophil,parac-S.therm-Bif. (RISAQUAD) Cap capsule Take 1 capsule by mouth once daily.    levocetirizine (XYZAL) 5 MG tablet Take 1 tablet (5 mg total) by mouth every evening.    losartan (COZAAR) 50 MG tablet Take 1 tablet (50 mg total) by mouth once daily.    multivitamin capsule Take 1 capsule by mouth once daily.    pantoprazole (PROTONIX) 40 MG tablet Take 1 tablet (40 mg total) by mouth once daily.    potassium chloride SA (K-DUR,KLOR-CON) 20 MEQ tablet Take 20 mEq by mouth once daily.    pyridoxine, vitamin B6, (B-6) 100 MG Tab Take 50 mg by mouth once daily.    vit A/vit C/vit E/zinc/copper (OCUVITE PRESERVISION ORAL) Take 1 capsule by mouth every evening.    acetaminophen (TYLENOL) 500 MG tablet Take 2 tablets (1,000 mg total) by mouth every 6 (six) hours as needed for Pain.    furosemide (LASIX) 20 MG tablet Take 20 mg by mouth once daily.    krill/om-3/dha/epa/phospho/ast (MEGARED OMEGA-3 KRILL OIL ORAL) Take 1 capsule by mouth every morning.     torsemide (DEMADEX) 20 MG Tab Take 1 tablet (20 mg total) by mouth 2 (two) times a day.    [DISCONTINUED] blood sugar diagnostic Strp Check blood glucose levels daily in the AM fasting and 1-2 times more daily.    [DISCONTINUED] clonazePAM (KLONOPIN) 1 MG tablet Take 1 tablet (1 mg total) by mouth nightly as needed for Anxiety. (Patient not taking: Reported on 2023)    [DISCONTINUED] lancets Misc Check blood glucose levels daily in the AM fasting and 1-2 times more daily.    [DISCONTINUED] predniSONE (DELTASONE) 20 MG tablet Take 1 tablet (20 mg total) by mouth once daily. (Patient not taking: Reported on 2023)    [DISCONTINUED] traMADoL (ULTRAM) 50 mg tablet Take 0.5 tablets (25 mg total) by mouth every 8 (eight) hours as needed for Pain. (Patient not taking: Reported on 2023)     Family History       Problem Relation (Age of Onset)    Cataracts Mother    Diabetes Mother    Heart attack Mother    Heart disease Mother, Father, Brother    Stroke Father          Tobacco Use    Smoking status: Former     Packs/day: 1.50     Years: 20.00     Pack years: 30.00     Types: Cigarettes     Start date:      Quit date:      Years since quittin.4    Smokeless tobacco: Never   Substance and Sexual Activity    Alcohol use: No    Drug use: No    Sexual activity: Not Currently     Review of Systems   Gastrointestinal:  Positive for abdominal pain.   Objective:     Vital Signs (Most Recent):  Temp: 99.1 °F (37.3 °C) (23 1637)  Pulse: 63 (23 1642)  Resp: 18 (23 1642)  BP: (!) 110/58 (23 1637)  SpO2: 98 % (23 1642) Vital Signs (24h Range):  Temp:  [99.1 °F (37.3 °C)-99.5 °F (37.5 °C)] 99.1 °F (37.3 °C)  Pulse:  [61-98] 63  Resp:  [18-30] 18  SpO2:  [95 %-98 %] 98 %  BP: (103-168)/() 110/58     Weight: 89.3 kg (196 lb 13.9 oz)  Body mass index is 28.25 kg/m².    SpO2: 98 %         Intake/Output Summary (Last 24 hours) at 2023 1713  Last data filed at 2023  1127  Gross per 24 hour   Intake 100 ml   Output --   Net 100 ml       Lines/Drains/Airways       Peripheral Intravenous Line  Duration                  Peripheral IV - Single Lumen 06/22/23 18 G Left Antecubital <1 day                     Physical Exam  Cardiovascular:      Rate and Rhythm: Normal rate.   Pulmonary:      Breath sounds: Normal breath sounds.        Significant Labs:   Recent Lab Results         06/22/23  1202   06/22/23  0834   06/22/23  0749        Albumin     3.4       Alkaline Phosphatase     104       ALT     50       Anion Gap     14       Appearance, UA Clear           AST     41       Bacteria, UA Rare           Baso #     0.01       Basophil %     0.1       Bilirubin (UA) Negative           BILIRUBIN TOTAL     1.0  Comment: For infants and newborns, interpretation of results should be based  on gestational age, weight and in agreement with clinical  observations.    Premature Infant recommended reference ranges:  Up to 24 hours.............<8.0 mg/dL  Up to 48 hours............<12.0 mg/dL  3-5 days..................<15.0 mg/dL  6-29 days.................<15.0 mg/dL         BUN     32       Calcium     9.1       Chloride     101       CO2     25       Color, UA Yellow           Creatinine     1.7       Differential Method     Automated       eGFR     40       Eos #     0.0       Eosinophil %     0.2       Glucose     143       Glucose, UA Negative           Gran # (ANC)     7.7       Gran %     81.2       Hematocrit     35.8       Hemoglobin     11.0       Hyaline Casts, UA 4           Immature Grans (Abs)     0.05  Comment: Mild elevation in immature granulocytes is non specific and   can be seen in a variety of conditions including stress response,   acute inflammation, trauma and pregnancy. Correlation with other   laboratory and clinical findings is essential.         Immature Granulocytes     0.5       Ketones, UA Negative           Lactate, Kiko   2.2  Comment: Falsely low lactic acid  results can be found in samples   containing >=13.0 mg/dL total bilirubin and/or >=3.5 mg/dL   direct bilirubin.           Leukocytes, UA Negative           Lipase     48       Lymph #     1.1       Lymph %     11.4       MCH     28.6       MCHC     30.7       MCV     93  Comment: Results confirmed, test repeated       Microscopic Comment SEE COMMENT  Comment: Other formed elements not mentioned in the report are not   present in the microscopic examination.              Mono #     0.6       Mono %     6.6       MPV     10.2       NITRITE UA Negative           nRBC     0       Occult Blood UA Negative           pH, UA 5.0           Platelets     350       Potassium     4.3       PROTEIN TOTAL     7.9       Protein, UA 1+  Comment: Recommend a 24 hour urine protein or a urine   protein/creatinine ratio if globulin induced proteinuria is  clinically suspected.             RBC     3.84       RBC, UA 2           RDW     17.2       Sodium     140       Specific [place], UA 1.020           Specimen UA Urine, Clean Catch           Unclass Jemma UA Rare           UROBILINOGEN UA Negative           WBC Clumps, UA Occasional           WBC, UA 2           WBC     9.54               Significant Imaging: Echocardiogram: Transthoracic echo (TTE) complete ([facility]upid Only):   Results for orders placed or performed during the hospital encounter of 05/10/23   Echo   Result Value Ref Range    BSA 2.14 m2    LA WIDTH 4.20 cm    IVC diameter 2.87 cm    Left Ventricular Outflow Tract Mean Velocity 0.56 cm/s    Left Ventricular Outflow Tract Mean Gradient 1.55 mmHg    LVIDd 6.51 (A) 3.5 - 6.0 cm    IVS 0.96 0.6 - 1.1 cm    Posterior Wall 0.85 0.6 - 1.1 cm    Ao root annulus 3.83 cm    LVIDs 4.59 (A) 2.1 - 4.0 cm    FS 29 28 - 44 %    LA volume 132.20 cm3    STJ 3.46 cm    Ascending aorta 3.17 cm    LV mass 250.23 g    LA size 5.31 cm    RVDD 4.24 cm    TAPSE 1.90 cm    Left Ventricle Relative Wall Thickness 0.26 cm    AV mean gradient 6 mmHg     AV valve area 1.83 cm2    AV Velocity Ratio 0.58     AV index (prosthetic) 0.57     MV valve area p 1/2 method 2.75 cm2    E/A ratio 3.60     E wave deceleration time 276.19 msec    IVRT 51.38 msec    LVOT diameter 2.02 cm    LVOT area 3.2 cm2    LVOT peak wilian 0.91 m/s    LVOT peak VTI 16.70 cm    Ao peak wilian 1.57 m/s    Ao VTI 29.3 cm    RVOT peak wilian 0.77 m/s    RVOT peak VTI 14.5 cm    Mr max wilian 4.78 m/s    LVOT stroke volume 53.49 cm3    AV peak gradient 10 mmHg    PV mean gradient 1.03 mmHg    MV Peak E Wilian 1.08 m/s    TR Max Wilian 3.08 m/s    MV stenosis pressure 1/2 time 80.09 ms    MV Peak A Wilian 0.30 m/s    LV Systolic Volume 96.76 mL    LV Systolic Volume Index 46.3 mL/m2    LV Diastolic Volume 216.76 mL    LV Diastolic Volume Index 103.71 mL/m2    LA Volume Index 63.3 mL/m2    LV Mass Index 120 g/m2    RA Major Axis 7.76 cm    Left Atrium Minor Axis 6.88 cm    Left Atrium Major Axis 7.07 cm    Triscuspid Valve Regurgitation Peak Gradient 38 mmHg    EF 50 %    Narrative    · Eccentric hypertrophy and low normal systolic function.  · Moderate left atrial enlargement.  · The estimated ejection fraction is 50%.  · Indeterminate left ventricular diastolic function.  · There is abnormal septal wall motion consistent with left bundle branch   block.  · Normal right ventricular size with normal right ventricular systolic   function.  · Mild-to-moderate mitral regurgitation.  · Mild to moderate tricuspid regurgitation.

## 2023-06-22 NOTE — ASSESSMENT & PLAN NOTE
Patient is identified as having Combined Systolic and Diastolic heart failure that is Chronic. CHF is currently controlled. Latest ECHO performed and demonstrates- Results for orders placed during the hospital encounter of 05/10/23    Echo    Interpretation Summary  · Eccentric hypertrophy and low normal systolic function.  · Moderate left atrial enlargement.  · The estimated ejection fraction is 50%.  · Indeterminate left ventricular diastolic function.  · There is abnormal septal wall motion consistent with left bundle branch block.  · Normal right ventricular size with normal right ventricular systolic function.  · Mild-to-moderate mitral regurgitation.  · Mild to moderate tricuspid regurgitation.  . Continue ACE/ARB and Furosemide and monitor clinical status closely. Monitor on telemetry. Patient is off CHF pathway.  Monitor strict Is&Os and daily weights.  Place on fluid restriction of 1.5 L. Continue to stress to patient importance of self efficacy and  on diet for CHF. Last BNP reviewed- and noted below No results for input(s): BNP, BNPTRIAGEBLO in the last 168 hours..

## 2023-06-22 NOTE — CONSULTS
[facility] - Telemetry ([facility])  Cardiology  Consult Note    Patient Name: Jake Ortiz  MRN: [id number]  Admission Date: 6/22/2023  Hospital Length of Stay: 0 days  Code Status: Full Code   Attending Provider: Evert Barnes MD   Consulting Provider: Ned Egan MD  Primary Care Physician: Byron Stevens MD  Principal Problem:Ruptured appendix    Patient information was obtained from patient, past medical records and ER records.     Inpatient consult to Cardiology  Consult performed by: Ned Egan MD  Consult ordered by: Evert Barnes MD  Reason for consult: PREOP        Subjective:     Chief Complaint:  PREOP      HPI:    84 yo male with hx of prostate cancer (not on active treatment), Afib on Eliquis, CHF, and CAD s/p CABG x 3 presented with abdominal pain that has gotten progressively worse over the last 6 weeks, but it was significantly worse this morning and therefore the wife brought him into the hospital.  On CT abdomen patient was found to have a perforated appendix and general surgery was consulted.  Dr. Moody recommended IR consulted for IR drain placement. Dr. Landin recommended CT with PO/IV contrast but due to renal function IV contrast cannot be given.  Patient is NPO and continues to have abdominal tenderness and guarding.       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    #4 PROSTATE BIOPSY, LEFT APEX: ADENOCARCINOMA, FARHAD GRADE 3 + 3 = 6, IN 1 of 2 CORES 9/8/2014    Anemia     Arthritis     Atrial fibrillation 10/19/2017    Back pain     Congestive heart failure 03/05/2018    Coronary artery disease     DM (diabetes mellitus) 2018     am 06/23/2020    DM (diabetes mellitus) 2016     am 08/17/2021    Hyperlipemia     Hypertension     Myocardial infarction     NASREEN (obstructive sleep apnea) 06/05/2018    Prostate cancer 2015    Tobacco dependence      Type 2 diabetes mellitus        Past Surgical History:   Procedure Laterality Date    COLONOSCOPY N/A 9/22/2020    Procedure: COLONOSCOPY;  Surgeon: Javier Farmer MD;  Location: [facility] ENDO;  Service: Endoscopy;  Laterality: N/A;    CORONARY ARTERY BYPASS GRAFT  1987    EPIDURAL STEROID INJECTION N/A 11/22/2019    Procedure: Lumbar L5/S1 IL XUAN;  Surgeon: Tacho Trivedi MD;  [facility];  Service: Pain Management;  Laterality: N/A;    EPIDURAL STEROID INJECTION N/A 1/6/2020    Procedure: Lumbar L5/S1 IL XUAN;  Surgeon: Tacho Trivedi MD;  Location: [facility];  Service: Pain Management;  Laterality: N/A;    EPIDURAL STEROID INJECTION N/A 2/10/2020    Procedure: Caudal XUAN;  Surgeon: Tacho Trivedi MD;  [facility];  Service: Pain Management;  Laterality: N/A;    ESOPHAGOGASTRODUODENOSCOPY N/A 9/22/2020    Procedure: EGD (ESOPHAGOGASTRODUODENOSCOPY);  Surgeon: Javier Farmer MD;  Location: [facility];  Service: Endoscopy;  Laterality: N/A;    INJECTION OF ANESTHETIC AGENT AROUND MEDIAL BRANCH NERVES INNERVATING LUMBAR FACET JOINT Bilateral 10/12/2020    Procedure: Bilateral L3-5 MBB;  Surgeon: Tacho Trivedi MD;  Location: [facility] PAIN MGT;  Service: Pain Management;  Laterality: Bilateral;    INJECTION OF ANESTHETIC AGENT AROUND MEDIAL BRANCH NERVES INNERVATING LUMBAR FACET JOINT Bilateral 12/21/2020    Procedure: Bilateral L3-5 MBB with steroid;  Surgeon: Tacho Trivedi MD;  Location: [facility] PAIN MGT;  Service: Pain Management;  Laterality: Bilateral;    INSERTION OF SPINAL NEUROSTIMULATOR N/A 3/23/2023    Procedure: INSERTION, NEUROSTIMULATOR, SPINAL;  Surgeon: Philipp Demarco MD;  Location: [facility] OR;  Service: Pain Management;  Laterality: N/A;    INTRALUMINAL GASTROINTESTINAL TRACT IMAGING VIA CAPSULE N/A 12/29/2021    Procedure: IMAGING PROCEDURE, GI TRACT, INTRALUMINAL, VIA CAPSULE;  Surgeon: Tahir Geronimo RN;  Location: [facility] ENDO;  Service:  Endoscopy;  Laterality: N/A;    PROSTATE SURGERY      prostate radiation    RADIOFREQUENCY THERMOCOAGULATION Left 6/4/2021    Procedure: Left L3-5 Lumbar RFA;  Surgeon: Tacho Trivedi MD;  Location: [facility]GT;  Service: Pain Management;  Laterality: Left;    RADIOFREQUENCY THERMOCOAGULATION Right 6/18/2021    Procedure: Right L3-5 Lumbar RFA;  Surgeon: Tacho Trivedi MD;  Location: [facility]GT;  Service: Pain Management;  Laterality: Right;    REPLACEMENT OF PACEMAKER GENERATOR Left 6/16/2022    Procedure: REPLACEMENT, PULSE GENERATOR, CARDIAC PACEMAKER/CRT-P device;  Surgeon: Darrel Carrero MD;  Location: [facility] CATH LAB;  Service: Cardiology;  Laterality: Left;  NOT MRI safe   Pacer and leads implanted 9/30/2017, Dr. Souleymane CARROLLT Consulta CRT-P C4TR01, [id number]   A lead: Mdt 5076 CapSureFix® Novus, [id number]   RV lead: Mdt 5076 CapSureFix® Novus, [id number]   LV lead: Jovan 4196 At    SELECTIVE INJECTION OF ANESTHETIC AGENT AROUND LUMBAR SPINAL NERVE ROOT BY TRANSFORAMINAL APPROACH Bilateral 6/8/2022    Procedure: Bilateral L3/4 TF XUAN with RN IV sedation;  Surgeon: Philipp Demarco MD;  Location: [facility] PAIN MGT;  Service: Pain Management;  Laterality: Bilateral;    SPINE SURGERY      fusion    TONSILLECTOMY      TRIAL OF SPINAL CORD NERVE STIMULATOR N/A 1/25/2023    Procedure: Trial, Neurostimulator, Spinal Cord with RN IV sedation;  Surgeon: Philipp Demarco MD;  [facility];  Service: Pain Management;  Laterality: N/A;       Review of patient's allergies indicates:  No Known Allergies    Current Facility-Administered Medications on File Prior to Encounter   Medication    ondansetron injection 4 mg     Current Outpatient Medications on File Prior to Encounter   Medication Sig    apixaban (ELIQUIS) 2.5 mg Tab Take 1 tablet (2.5 mg total) by mouth 2 (two) times daily.    atorvastatin (LIPITOR) 80 MG tablet Take 1 tablet (80 mg total) by mouth every evening.    cholecalciferol,  vitamin D3, (VITAMIN D3) 25 mcg (1,000 unit) capsule Take 1,000 Units by mouth once daily.    ferrous sulfate (FEOSOL) 325 mg (65 mg iron) Tab tablet Take 1 tablet (325 mg total) by mouth 2 (two) times daily. (Patient taking differently: Take 325 mg by mouth once daily.)    fluticasone propionate (FLONASE) 50 mcg/actuation nasal spray 2 sprays (100 mcg total) by Each Nostril route once daily.    L.acidophil,parac-S.therm-Bif. (RISAQUAD) Cap capsule Take 1 capsule by mouth once daily.    levocetirizine (XYZAL) 5 MG tablet Take 1 tablet (5 mg total) by mouth every evening.    losartan (COZAAR) 50 MG tablet Take 1 tablet (50 mg total) by mouth once daily.    multivitamin capsule Take 1 capsule by mouth once daily.    pantoprazole (PROTONIX) 40 MG tablet Take 1 tablet (40 mg total) by mouth once daily.    potassium chloride SA (K-DUR,KLOR-CON) 20 MEQ tablet Take 20 mEq by mouth once daily.    pyridoxine, vitamin B6, (B-6) 100 MG Tab Take 50 mg by mouth once daily.    vit A/vit C/vit E/zinc/copper (OCUVITE PRESERVISION ORAL) Take 1 capsule by mouth every evening.    acetaminophen (TYLENOL) 500 MG tablet Take 2 tablets (1,000 mg total) by mouth every 6 (six) hours as needed for Pain.    furosemide (LASIX) 20 MG tablet Take 20 mg by mouth once daily.    krill/om-3/dha/epa/phospho/ast (MEGARED OMEGA-3 KRILL OIL ORAL) Take 1 capsule by mouth every morning.    torsemide (DEMADEX) 20 MG Tab Take 1 tablet (20 mg total) by mouth 2 (two) times a day.    [DISCONTINUED] blood sugar diagnostic Strp Check blood glucose levels daily in the AM fasting and 1-2 times more daily.    [DISCONTINUED] clonazePAM (KLONOPIN) 1 MG tablet Take 1 tablet (1 mg total) by mouth nightly as needed for Anxiety. (Patient not taking: Reported on 6/22/2023)    [DISCONTINUED] lancets Misc Check blood glucose levels daily in the AM fasting and 1-2 times more daily.    [DISCONTINUED] predniSONE (DELTASONE) 20 MG tablet Take 1 tablet (20 mg  total) by mouth once daily. (Patient not taking: Reported on 2023)    [DISCONTINUED] traMADoL (ULTRAM) 50 mg tablet Take 0.5 tablets (25 mg total) by mouth every 8 (eight) hours as needed for Pain. (Patient not taking: Reported on 2023)     Family History       Problem Relation (Age of Onset)    Cataracts Mother    Diabetes Mother    Heart attack Mother    Heart disease Mother, Father, Brother    Stroke Father          Tobacco Use    Smoking status: Former     Packs/day: 1.50     Years: 20.00     Pack years: 30.00     Types: Cigarettes     Start date:      Quit date:      Years since quittin.4    Smokeless tobacco: Never   Substance and Sexual Activity    Alcohol use: No    Drug use: No    Sexual activity: Not Currently     Review of Systems   Gastrointestinal:  Positive for abdominal pain.   Objective:     Vital Signs (Most Recent):  Temp: 99.1 °F (37.3 °C) (23 1637)  Pulse: 63 (23 1642)  Resp: 18 (23 1642)  BP: (!) 110/58 (23 1637)  SpO2: 98 % (23 164) Vital Signs (24h Range):  Temp:  [99.1 °F (37.3 °C)-99.5 °F (37.5 °C)] 99.1 °F (37.3 °C)  Pulse:  [61-98] 63  Resp:  [18-30] 18  SpO2:  [95 %-98 %] 98 %  BP: (103-168)/() 110/58     Weight: 89.3 kg (196 lb 13.9 oz)  Body mass index is 28.25 kg/m².    SpO2: 98 %         Intake/Output Summary (Last 24 hours) at 2023 1713  Last data filed at 2023 1127  Gross per 24 hour   Intake 100 ml   Output --   Net 100 ml       Lines/Drains/Airways       Peripheral Intravenous Line  Duration                  Peripheral IV - Single Lumen 23 18 G Left Antecubital <1 day                     Physical Exam  Cardiovascular:      Rate and Rhythm: Normal rate.   Pulmonary:      Breath sounds: Normal breath sounds.        Significant Labs:   Recent Lab Results         23  1202   23  0834   23  0749        Albumin     3.4       Alkaline Phosphatase     104       ALT     50       Anion Gap      14       Appearance, UA Clear           AST     41       Bacteria, UA Rare           Baso #     0.01       Basophil %     0.1       Bilirubin (UA) Negative           BILIRUBIN TOTAL     1.0  Comment: For infants and newborns, interpretation of results should be based  on gestational age, weight and in agreement with clinical  observations.    Premature Infant recommended reference ranges:  Up to 24 hours.............<8.0 mg/dL  Up to 48 hours............<12.0 mg/dL  3-5 days..................<15.0 mg/dL  6-29 days.................<15.0 mg/dL         BUN     32       Calcium     9.1       Chloride     101       CO2     25       Color, UA Yellow           Creatinine     1.7       Differential Method     Automated       eGFR     40       Eos #     0.0       Eosinophil %     0.2       Glucose     143       Glucose, UA Negative           Gran # (ANC)     7.7       Gran %     81.2       Hematocrit     35.8       Hemoglobin     11.0       Hyaline Casts, UA 4           Immature Grans (Abs)     0.05  Comment: Mild elevation in immature granulocytes is non specific and   can be seen in a variety of conditions including stress response,   acute inflammation, trauma and pregnancy. Correlation with other   laboratory and clinical findings is essential.         Immature Granulocytes     0.5       Ketones, UA Negative           Lactate, Kiko   2.2  Comment: Falsely low lactic acid results can be found in samples   containing >=13.0 mg/dL total bilirubin and/or >=3.5 mg/dL   direct bilirubin.           Leukocytes, UA Negative           Lipase     48       Lymph #     1.1       Lymph %     11.4       MCH     28.6       MCHC     30.7       MCV     93  Comment: Results confirmed, test repeated       Microscopic Comment SEE COMMENT  Comment: Other formed elements not mentioned in the report are not   present in the microscopic examination.              Mono #     0.6       Mono %     6.6       MPV     10.2       NITRITE UA Negative            nRBC     0       Occult Blood UA Negative           pH, UA 5.0           Platelets     350       Potassium     4.3       PROTEIN TOTAL     7.9       Protein, UA 1+  Comment: Recommend a 24 hour urine protein or a urine   protein/creatinine ratio if globulin induced proteinuria is  clinically suspected.             RBC     3.84       RBC, UA 2           RDW     17.2       Sodium     140       Specific [place], UA 1.020           Specimen UA Urine, Clean Catch           Unclass Jemma UA Rare           UROBILINOGEN UA Negative           WBC Clumps, UA Occasional           WBC, UA 2           WBC     9.54               Significant Imaging: Echocardiogram: Transthoracic echo (TTE) complete ([facility] Only):   Results for orders placed or performed during the hospital encounter of 05/10/23   Echo   Result Value Ref Range    BSA 2.14 m2    LA WIDTH 4.20 cm    IVC diameter 2.87 cm    Left Ventricular Outflow Tract Mean Velocity 0.56 cm/s    Left Ventricular Outflow Tract Mean Gradient 1.55 mmHg    LVIDd 6.51 (A) 3.5 - 6.0 cm    IVS 0.96 0.6 - 1.1 cm    Posterior Wall 0.85 0.6 - 1.1 cm    Ao root annulus 3.83 cm    LVIDs 4.59 (A) 2.1 - 4.0 cm    FS 29 28 - 44 %    LA volume 132.20 cm3    STJ 3.46 cm    Ascending aorta 3.17 cm    LV mass 250.23 g    LA size 5.31 cm    RVDD 4.24 cm    TAPSE 1.90 cm    Left Ventricle Relative Wall Thickness 0.26 cm    AV mean gradient 6 mmHg    AV valve area 1.83 cm2    AV Velocity Ratio 0.58     AV index (prosthetic) 0.57     MV valve area p 1/2 method 2.75 cm2    E/A ratio 3.60     E wave deceleration time 276.19 msec    IVRT 51.38 msec    LVOT diameter 2.02 cm    LVOT area 3.2 cm2    LVOT peak wilian 0.91 m/s    LVOT peak VTI 16.70 cm    Ao peak wilian 1.57 m/s    Ao VTI 29.3 cm    RVOT peak wilian 0.77 m/s    RVOT peak VTI 14.5 cm    Mr max wilian 4.78 m/s    LVOT stroke volume 53.49 cm3    AV peak gradient 10 mmHg    PV mean gradient 1.03 mmHg    MV Peak E Wilian 1.08 m/s    TR Max Wilian 3.08 m/s    MV  stenosis pressure 1/2 time 80.09 ms    MV Peak A Wilian 0.30 m/s    LV Systolic Volume 96.76 mL    LV Systolic Volume Index 46.3 mL/m2    LV Diastolic Volume 216.76 mL    LV Diastolic Volume Index 103.71 mL/m2    LA Volume Index 63.3 mL/m2    LV Mass Index 120 g/m2    RA Major Axis 7.76 cm    Left Atrium Minor Axis 6.88 cm    Left Atrium Major Axis 7.07 cm    Triscuspid Valve Regurgitation Peak Gradient 38 mmHg    EF 50 %    Narrative    · Eccentric hypertrophy and low normal systolic function.  · Moderate left atrial enlargement.  · The estimated ejection fraction is 50%.  · Indeterminate left ventricular diastolic function.  · There is abnormal septal wall motion consistent with left bundle branch   block.  · Normal right ventricular size with normal right ventricular systolic   function.  · Mild-to-moderate mitral regurgitation.  · Mild to moderate tricuspid regurgitation.        Assessment and Plan:     * Ruptured appendix  As per general surgery     Preoperative cardiovascular examination  Euvolemic   Angina free   No contraindications from cardiac standpoint to proceed with surgery   Hold eliquis for at least 48 hours, last dose in pm yesterday   Hold torsemide, monitor I/O , gentle hydration   He is at moderate risk, non modifiable but stable and ok to proceed with surgery     S/P CABG x 3  Stable and asymptomatic         VTE Risk Mitigation (From admission, onward)         Ordered     IP VTE HIGH RISK PATIENT  Once         06/22/23 1405     Place sequential compression device  Until discontinued         06/22/23 1405                Thank you for your consult. I will follow-up with patient. Please contact us if you have any additional questions.    Ned Egan MD  Cardiology   [facility] - Telemetry ([facility])

## 2023-06-22 NOTE — ASSESSMENT & PLAN NOTE
Patient with Permanent atrial fibrillation which is controlled currently with no medication. Patient is currently in sinus rhythm.[id number] Score: 4.. Anticoagulation indicated. Anticoagulation done with Eliquis.

## 2023-06-22 NOTE — ANESTHESIA PREPROCEDURE EVALUATION
06/22/2023  Jake Ortiz is a 83 y.o., male with hx of prostate cancer (not on active treatment), Afib on Eliquis, CHF, and CAD s/p CABG x 3 presented with abdominal pain that has gotten progressively worse over the last 6 weeks. CT scan was shown concerning for extraluminal air fluid collection near where the appendix should be near the cecum concerning for possible perforation/abscess. To OR for Diagnostic Lap    *Seen and Cleared by Cards today (Dr. Egan):  No contraindications from cardiac standpoint to proceed with surgery   Hold eliquis for at least 48 hours, last dose in pm yesterday   Hold torsemide, monitor I/O , gentle hydration   He is at moderate risk, non modifiable but stable and ok to proceed with surgery     Patient Active Problem List   Diagnosis    Hypertension    Hyperlipemia    CAD (coronary artery disease)    MI, old    S/P CABG x 3    Psoriasis    Rheumatoid factor positive    Multiple joint pain    Hyperuricemia    Mixed restrictive and obstructive lung disease    Granulomatous disease    Arteriosclerosis of aorta    History of prostate cancer;Adenocarcinoma of prostate    Atrial fibrillation with chronic anticoagulation with Eliquis    Nocturnal hypoxemia    Cardiomegaly    Chronic combined systolic and diastolic congestive heart failure    Chronic restrictive lung disease    Obstructive sleep apnea    Iron deficiency anemia due to chronic blood loss    Periodic limb movement disorder (PLMD)    Other ascites    Lumbar radiculopathy    Acute renal failure superimposed on stage 3 chronic kidney disease    Spondylosis without myelopathy or radiculopathy, lumbar region    Anemia associated with stage 4 chronic renal failure    Stage 4 chronic kidney disease    Lumbar spondylosis    Preoperative cardiovascular examination    Pseudopolyposis of colon  without complication    Secondary hyperparathyroidism of renal origin    Symptomatic anemia    Diabetes mellitus type 2 in obese    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    Presence of cardiac resynchronization therapy pacemaker (CRT-P)    Lumbar radiculopathy, chronic    Elevated MCV    Failed back surgical syndrome    Causalgia of lower extremity    Aortic valve stenosis    Weakness of both lower extremities    Class 1 obesity due to excess calories with serious comorbidity and body mass index (BMI) of 32.0 to 32.9 in adult    Chronic pain of left knee    Fall    Localized edema    Ruptured appendix     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    #4 PROSTATE BIOPSY, LEFT APEX: ADENOCARCINOMA, FARHAD GRADE 3 + 3 = 6, IN 1 of 2 CORES 9/8/2014    Anemia     Arthritis     Atrial fibrillation 10/19/2017    Back pain     Congestive heart failure 03/05/2018    Coronary artery disease     DM (diabetes mellitus) 2018     am 06/23/2020    DM (diabetes mellitus) 2016     am 08/17/2021    Hyperlipemia     Hypertension     Myocardial infarction     NASREEN (obstructive sleep apnea) 06/05/2018    Prostate cancer 2015    Tobacco dependence     Type 2 diabetes mellitus      Past Surgical History:   Procedure Laterality Date    COLONOSCOPY N/A 9/22/2020    Procedure: COLONOSCOPY;  Surgeon: Javier Farmer MD;  Location: [facility] ENDO;  Service: Endoscopy;  Laterality: N/A;    CORONARY ARTERY BYPASS GRAFT  1987    EPIDURAL STEROID INJECTION N/A 11/22/2019    Procedure: Lumbar L5/S1 IL XUAN;  Surgeon: Tacho Trivedi MD;  Location: [facility] PAIN MGT;  Service: Pain Management;  Laterality: N/A;    EPIDURAL STEROID INJECTION N/A 1/6/2020    Procedure: Lumbar L5/S1 IL XUAN;  Surgeon: Tacho Trivedi MD;  Location: [facility] PAIN MGT;  Service: Pain Management;  Laterality: N/A;    EPIDURAL STEROID INJECTION N/A 2/10/2020     Procedure: Caudal XUAN;  Surgeon: Tacho Trivedi MD;  Location: [facility] PAIN MGT;  Service: Pain Management;  Laterality: N/A;    ESOPHAGOGASTRODUODENOSCOPY N/A 9/22/2020    Procedure: EGD (ESOPHAGOGASTRODUODENOSCOPY);  Surgeon: Javier Farmer MD;  Location: [facility] ENDO;  Service: Endoscopy;  Laterality: N/A;    INJECTION OF ANESTHETIC AGENT AROUND MEDIAL BRANCH NERVES INNERVATING LUMBAR FACET JOINT Bilateral 10/12/2020    Procedure: Bilateral L3-5 MBB;  Surgeon: Tacho Trivedi MD;  Location: [facility] PAIN MGT;  Service: Pain Management;  Laterality: Bilateral;    INJECTION OF ANESTHETIC AGENT AROUND MEDIAL BRANCH NERVES INNERVATING LUMBAR FACET JOINT Bilateral 12/21/2020    Procedure: Bilateral L3-5 MBB with steroid;  Surgeon: Tacho Trivedi MD;  Location: [facility] PAIN MGT;  Service: Pain Management;  Laterality: Bilateral;    INSERTION OF SPINAL NEUROSTIMULATOR N/A 3/23/2023    Procedure: INSERTION, NEUROSTIMULATOR, SPINAL;  Surgeon: Philipp Demarco MD;  Location: [facility] OR;  Service: Pain Management;  Laterality: N/A;    INTRALUMINAL GASTROINTESTINAL TRACT IMAGING VIA CAPSULE N/A 12/29/2021    Procedure: IMAGING PROCEDURE, GI TRACT, INTRALUMINAL, VIA CAPSULE;  Surgeon: Tahir Geronimo RN;  Location: [facility] ENDO;  Service: Endoscopy;  Laterality: N/A;    PROSTATE SURGERY      prostate radiation    RADIOFREQUENCY THERMOCOAGULATION Left 6/4/2021    Procedure: Left L3-5 Lumbar RFA;  Surgeon: Tacho Trivedi MD;  Location: [facility] PAIN MGT;  Service: Pain Management;  Laterality: Left;    RADIOFREQUENCY THERMOCOAGULATION Right 6/18/2021    Procedure: Right L3-5 Lumbar RFA;  Surgeon: Tacho Trivedi MD;  Location: [facility] PAIN MGT;  Service: Pain Management;  Laterality: Right;    REPLACEMENT OF PACEMAKER GENERATOR Left 6/16/2022    Procedure: REPLACEMENT, PULSE GENERATOR, CARDIAC PACEMAKER/CRT-P device;  Surgeon: Darrel Carrero MD;  Location: [facility] CATH LAB;  Service: Cardiology;  Laterality: Left;  NOT MRI  safe   Pacer and leads implanted 9/30/2017, Dr. Souleymane CARROLLT Consulta CRT-P [id number], [id number]   A lead: Mdt 5076 CapSureFix® Novus, [id number]   RV lead: Mdt 5076 CapSureFix® Novus, [id number]   LV lead: Jovan 4196 At    SELECTIVE INJECTION OF ANESTHETIC AGENT AROUND LUMBAR SPINAL NERVE ROOT BY TRANSFORAMINAL APPROACH Bilateral 6/8/2022    Procedure: Bilateral L3/4 TF XUAN with RN IV sedation;  Surgeon: Philipp Demarco MD;  Location: [facility] PAIN T;  Service: Pain Management;  Laterality: Bilateral;    SPINE SURGERY      fusion    TONSILLECTOMY      TRIAL OF SPINAL CORD NERVE STIMULATOR N/A 1/25/2023    Procedure: Trial, Neurostimulator, Spinal Cord with RN IV sedation;  Surgeon: Philipp Demarco MD;  Location: [facility] [facility];  Service: Pain Management;  Laterality: N/A;     ECHO: (5/11/23)  Summary     Eccentric hypertrophy and low normal systolic function.   Moderate left atrial enlargement.   The estimated ejection fraction is 50%.   Indeterminate left ventricular diastolic function.   There is abnormal septal wall motion consistent with left bundle branch block.   Normal right ventricular size with normal right ventricular systolic function.   Mild-to-moderate mitral regurgitation.   Mild to moderate tricuspid regurgitation.      Pre-op Assessment    I have reviewed the Patient Summary Reports.    I have reviewed the NPO Status.   I have reviewed the Medications.     Review of Systems  Anesthesia Hx:  No problems with previous Anesthesia Had 2 bites of jello 2.5 hrs ago; had 3 sips of water @ approx 18:30  History of prior surgery of interest to airway management or planning: Previous anesthesia: MAC, General  Denies Personal Hx of Anesthesia complications.   Social:  Former Smoker    Hematology/Oncology:         -- Anemia: --  Cancer in past history:    Cardiovascular:   Pacemaker Hypertension Past MI CAD  CABG/stent Dysrhythmias atrial fibrillation CHF hyperlipidemia ECG has been reviewed.  Ventricular-paced rhythm   Biventricular pacemaker detected   Abnormal ECG   When compared with ECG of 08-JUN-2023 15:00,   No significant change was found   Confirmed by ELIZABETH ZHU MD ([id number]) on 6/22/2023 3:08:40 PM    Pulmonary:   COPD Sleep Apnea, CPAP Mixed restrictive and obstructive lung disease   Renal/:   Chronic Renal Disease, CKD    Hepatic/GI:   GERD    Musculoskeletal:   Arthritis  Rheumatoid factor positive  Multiple joint pain     Neurological:   Neuromuscular Disease,    Endocrine:   Diabetes, type 2 BMI 28 Denies Obesity / BMI > 30      Physical Exam  General: Well nourished, Cooperative, Alert and Oriented    Airway:  Mallampati: II   Mouth Opening: Normal  TM Distance: Normal  Neck ROM: Normal ROM    Dental:  Partial Dentures  partials removed; denies loose teeth      Anesthesia Plan  Type of Anesthesia, risks & benefits discussed:    Anesthesia Type: Gen ETT  Intra-op Monitoring Plan: Standard ASA Monitors  Post Op Pain Control Plan: multimodal analgesia and IV/PO Opioids PRN  Induction:  IV  Airway Plan: Direct, Post-Induction  Informed Consent: Informed consent signed with the Patient and all parties understand the risks and agree with anesthesia plan.  All questions answered.   ASA Score: 3 Emergent  Day of Surgery Review of History & Physical: H&P Update referred to the surgeon/provider.  Anesthesia Plan Notes: plan for RSI considering urgent nature of procedure       Ready For Surgery From Anesthesia Perspective.     .        Chemistry        Component Value Date/Time     06/22/2023 0749    K 4.3 06/22/2023 0749     06/22/2023 0749    CO2 25 06/22/2023 0749    BUN 32 (H) 06/22/2023 0749    CREATININE 1.7 (H) 06/22/2023 0749     (H) 06/22/2023 0749        Component Value Date/Time    CALCIUM 9.1 06/22/2023 0749    ALKPHOS 104 06/22/2023 0749    AST 41 (H) 06/22/2023 0749    ALT 50 (H) 06/22/2023 0749    BILITOT 1.0 06/22/2023 0749    ESTGFRAFRICA 50 (A) 07/14/2022  1048    EGFRNONAA 43 (A) 07/14/2022 1048        Lab Results   Component Value Date    WBC 9.54 06/22/2023    HGB 11.0 (L) 06/22/2023    HCT 35.8 (L) 06/22/2023    MCV 93 06/22/2023     06/22/2023         Program found technically poor ECG   Electronic ventricular pacemaker   When compared with ECG of 16-JUN-2022 09:18,   Vent. rate has decreased BY  12 BPM   Confirmed by FRANCISCA CARROLL, ALVA ([id number]) on 6/16/2022 10:14:12 PM     Echo 4/29/22:   The left ventricle is normal in size with concentric hypertrophy and moderately decreased systolic function.   The estimated ejection fraction is 35%.   Grade III left ventricular diastolic dysfunction.   Mild right ventricular enlargement with moderately reduced right ventricular systolic function.   Mild left atrial enlargement.   Mild right atrial enlargement.   There is mild aortic valve stenosis.   Aortic valve area is 1.53 cm2; peak velocity is 1.78 m/s; mean gradient is 7 mmHg.   Mild mitral regurgitation.   Mild tricuspid regurgitation.   Elevated central venous pressure (15 mmHg).   The estimated PA systolic pressure is 37 mmHg.   There is abnormal septal wall motion. There is systolic and diastolic flattening of the interventricular septum consistent with right ventricle pressure and volume overload.   There is moderate left ventricular global hypokinesis.     Saw Cards 10/27/22 for pre op assessment spinal cord stim:    Assessment:      1. Presence of cardiac pacemaker    2. Chronic combined systolic and diastolic congestive heart failure    3. Presence of cardiac resynchronization therapy pacemaker (CRT-P)    4. S/P CABG x 3    5. Arteriosclerosis of aorta    6. Primary hypertension    7. Lumbar radiculopathy, chronic    8. Mixed restrictive and obstructive lung disease    9. Paroxysmal atrial fibrillation    10. Cardiac pacemaker in situ    11. Longstanding persistent atrial fibrillation    12. Obstructive sleep apnea             Plan:   1. CAD s/p  CABG x3, old MI  - cont meds - DAPT on hold due to anemia   - elevated trop sec to demand ischemia   - cont Eliquis     2. CHF EF 35%, with TR/MR, improved EF ;  --> 282 -> 222 --> 471 --> 230  --> 507 --> 276  Weight; 219 lbs --> 206  --> 200 lbs  --> 195 lbs  - cont meds lasix 40 BID ; with K/Mg   - needs to take 80mg BID PRN edema  - cont to monitor valve disease  - changed Entresto to Losartan   - cont Aldactone 25mg     3. Chronic Afib   - cont meds - Eliquis   - f/u Dr. Price for Watchman device - pending/on hold      [id number] score: 5  HAS-BLED score: 5     If you answer NO to any of the four criteria below, the patient does not meet the WATCHMAN implant eligibility requirements.      1. Patient has non-valvular atrial fibrillation: Yes  2. Patient has an increased risk for stroke and is recommended for oral anticoagulation (OAC): Yes  3. Patient is suitable for short-term anticoagulation therapy but deemed unable to take long-term OAC: Yes  Patient has an appropriate rationale to seek a non-pharmacological alternative to OACs. Specific factors include: History of bleeding or increased bleeding risk,     CHADSVASC - 5  HASLBED score - 5     4. NASREEN  - cont CPAP     5. Restricive/obstrucive lung disease  - cont tx per PCP/pulm     6. DM2 6.0  --> 6.1 --> 6.3 --> 5.1 --> 4.9  - cont tx per PCP     7. AVB s/p ICD - s/p CRT-P gen change 6/2022  - cont to monitor  - f/u device clinic and EP     8. Anemia sec to GIB, CKD  - f/u with GI and heme/onc; s/p video capsule - neg in past  - s/p PRBC, is on Eliquis 2.5 BID, DAPT on hold.   - cont iron tabs and IV iron infusion   - f/u colorectal sx - may need hemorrhoidal banding sx    9. CKD  - cont to monitor   - f/u nephro      10. Preop CV eval - spinal cord stim on Nevro 11/16/22 with Dr. Demarco  - low CV risk to proceed, ok to hold Eliquis 3 days prior and resume post op.

## 2023-06-22 NOTE — ASSESSMENT & PLAN NOTE
Euvolemic   Angina free   No contraindications from cardiac standpoint to proceed with surgery   Hold eliquis for at least 48 hours, last dose in pm yesterday   Hold torsemide, monitor I/O , gentle hydration   He is at moderate risk, non modifiable but stable and ok to proceed with surgery

## 2023-06-22 NOTE — ASSESSMENT & PLAN NOTE
- Patient on Eliquis - last dose 6/21 PM  - Hold Eliquis for possible procedure  - Cardiology consulted for risk stratification.

## 2023-06-23 NOTE — SUBJECTIVE & OBJECTIVE
Interval History: Per RN,pt had some bloody output from NG tube overnight. Hgb stable. Pt seen with wife at bedside. He reports he feels tired, continues to have pain diffusely, is unsure if morphine PCA is helping. Denies nausea, vomiting. Report dyspnea with transfers in bed, no CP. Currently on 3L NC O2.     Review of Systems  Objective:     Vital Signs (Most Recent):  Temp: 98.6 °F (37 °C) (06/23/23 0706)  Pulse: 62 (06/23/23 0706)  Resp: 18 (06/23/23 0706)  BP: (!) 130/58 (06/23/23 0706)  SpO2: 98 % (06/23/23 0706) Vital Signs (24h Range):  Temp:  [97.9 °F (36.6 °C)-99.9 °F (37.7 °C)] 98.6 °F (37 °C)  Pulse:  [50-86] 62  Resp:  [12-38] 18  SpO2:  [95 %-99 %] 98 %  BP: (103-197)/(53-74) 130/58     Weight: 91.3 kg (201 lb 4.5 oz)  Body mass index is 28.88 kg/m².    Intake/Output Summary (Last 24 hours) at 6/23/2023 1049  Last data filed at 6/23/2023 0710  Gross per 24 hour   Intake 1610 ml   Output 940 ml   Net 670 ml         Physical Exam  Vitals and nursing note reviewed.   Constitutional:       General: He is not in acute distress.     Appearance: He is ill-appearing.   HENT:      Head: Normocephalic and atraumatic.      Comments: NGT in place with bilious output noted   Cardiovascular:      Rate and Rhythm: Normal rate and regular rhythm.      Heart sounds: No murmur heard.    No friction rub. No gallop.   Pulmonary:      Comments: Shallow effort due to pain, no WRR noted, no increased work of breathing, on 3L NC O2   Abdominal:      Comments: Soft, bowel sounds hypoactive, NANCY drain with serosanguinous output, midline incision with dressing in place, abd binder in place    Musculoskeletal:      Right lower leg: No edema.      Left lower leg: No edema.   Neurological:      Mental Status: He is alert and oriented to person, place, and time. Mental status is at baseline.           Significant Labs: All pertinent labs within the past 24 hours have been reviewed.    Significant Imaging: I have reviewed all  pertinent imaging results/findings within the past 24 hours.

## 2023-06-23 NOTE — PLAN OF CARE
Problem: Adult Inpatient Plan of Care  Goal: Plan of Care Review  Outcome: Ongoing, Progressing  Goal: Patient-Specific Goal (Individualized)  Outcome: Ongoing, Progressing  Goal: Absence of Hospital-Acquired Illness or Injury  Outcome: Ongoing, Progressing  Goal: Optimal Comfort and Wellbeing  Outcome: Ongoing, Progressing  Goal: Readiness for Transition of Care  Outcome: Ongoing, Progressing     Problem: Fluid and Electrolyte Imbalance (Acute Kidney Injury/Impairment)  Goal: Fluid and Electrolyte Balance  Outcome: Ongoing, Progressing     Problem: Infection  Goal: Absence of Infection Signs and Symptoms  Outcome: Ongoing, Progressing     Problem: Fall Injury Risk  Goal: Absence of Fall and Fall-Related Injury  Outcome: Ongoing, Progressing     Problem: Skin Injury Risk Increased  Goal: Skin Health and Integrity  Outcome: Ongoing, Progressing

## 2023-06-23 NOTE — ASSESSMENT & PLAN NOTE
Patient is identified as having Combined Systolic and Diastolic heart failure that is Chronic. CHF is currently controlled. Latest ECHO performed and demonstrates- Results for orders placed during the hospital encounter of 05/10/23    Echo    Interpretation Summary  · Eccentric hypertrophy and low normal systolic function.  · Moderate left atrial enlargement.  · The estimated ejection fraction is 50%.  · Indeterminate left ventricular diastolic function.  · There is abnormal septal wall motion consistent with left bundle branch block.  · Normal right ventricular size with normal right ventricular systolic function.  · Mild-to-moderate mitral regurgitation.  · Mild to moderate tricuspid regurgitation.  - hold home diuretic, ARB while NPO   - strict Is and Os   - Judicious IV fluids while NPO

## 2023-06-23 NOTE — TRANSFER OF CARE
Anesthesia Transfer of Care Note    Patient: Jake Ortiz    Procedure(s) Performed: Procedure(s) (LRB):  LAPAROSCOPY, DIAGNOSTIC, possible laparotomy (N/A)  LAPAROTOMY  BLOCK, TRANSVERSUS ABDOMINIS PLANE    Patient location: PACU    Anesthesia Type: general    Transport from OR: Transported from OR on room air with adequate spontaneous ventilation    Post pain: adequate analgesia    Post assessment: no apparent anesthetic complications and tolerated procedure well    Post vital signs: stable    Level of consciousness: awake and alert    Nausea/Vomiting: no nausea/vomiting    Complications: none    Transfer of care protocol was followed      Last vitals:   Visit Vitals  BP (!) 170/73   Pulse 68   Temp 36.6 °C (97.9 °F) (Temporal)   Resp (!) 24   Wt 89.3 kg (196 lb 13.9 oz)   SpO2 98%   BMI 28.25 kg/m²

## 2023-06-23 NOTE — ASSESSMENT & PLAN NOTE
82yo F with perforation/abscess/plegmon in the RLQ concerning for perforation of appendix vs cecum vs small bowel s/p open [facility] 06/23/23    - continue IV abx  - continue NG tube, NPO  - awaiting bowel function return  - PCA for pain  - incentive spirometry  - encouraged OOB, ambulation

## 2023-06-23 NOTE — ASSESSMENT & PLAN NOTE
- CT abdomen with PO contrast: Unchanged air fluid collection adjacent to the cecum.  No oral contrast is seen within this collection. Increasing airspace opacities within the lower lobes bilaterally suggesting atelectasis or pneumonia.   - Gen surg consulted- s/p ex-lap with R hemicolectomy on 06/22/23  - NPO, NGT per surgery   - Continue IV Zosyn   - Currently on Morphine PCA for pain control, will wean off latera today if ok with surgery   - continue gentle IVF (decrease rate given hx of CHF)

## 2023-06-23 NOTE — NURSING
NGT with bright red bloody output, Dr. Moody paged awaiting response and NP Danilo Britton notified. Pt is vitally stable, denies any symptoms. Will continue to monitor output, repeat H&H ordered for am. Pt is resting in bed, alarm on, wife at bedside

## 2023-06-23 NOTE — ASSESSMENT & PLAN NOTE
Cr 1.4-1.7 recently   Appears to be close to baseline   Monitor BMP; renally dose meds   Avoid nephrotoxic agents

## 2023-06-23 NOTE — OP NOTE
[facility])  Surgery Department  Operative Note    SUMMARY     Date of Procedure: 6/22/2023     Procedure:  Diagnostic laparoscopy converted to exploratory laparotomy  Right hemicolectomy  Omental pedicle flap  Biopsy of abdominal wall  Transversus abdominis plane block    Surgeon(s) and Role:     * Victor Hugo Moody MD - Primary    Assisting Surgeon: None    Pre-Operative Diagnosis: Perforated appendicitis [K35.32]  Peritonitis [K65.9]    Post-Operative Diagnosis: Post-Op Diagnosis Codes:     * Perforated appendicitis [K35.32]     * Peritonitis [K65.9]    Anesthesia: General    Technical Procedures Used:   Diagnostic laparoscopy converted to exploratory laparotomy  Right hemicolectomy  Omental pedicle flap  Biopsy of abdominal wall  Transversus abdominis plane block    Indications for Procedure:  83-year-old male with evidence either appendiceal or colonic perforation with peritonitis who presents for definitive surgical management    Findings of the Procedure:  Walled-off perforation in the right abdomen near the appendix/cecum with large amount of intra-abdominal contamination from purulence requiring conversion to open operation and right hemicolectomy    Description of the Procedure:  Patient was brought to the operating room and placed supine on table.  General endotracheal anesthesia was induced.  A Serra catheter was sterilely placed.  The abdomen was then prepped and draped in usual sterile fashion.  A preoperative surgical time-out was performed confirming the correct patient procedure and preop medications given.  A 5 mm supraumbilical incision was made and the Veress needle was inserted into abdominal cavity and confirmed good position with aspiration and saline drop test.  The abdomen is insufflated to a pressure 15 mm of mercury.  A 5 mm trocar was inserted this defect using Optiview technique into the abdominal cavity.  The camera was introduced and there was no signs of injury upon  entry.  There was noted to be purulent and murky fluid throughout the abdomen in the left lower quadrant, pelvis, left upper quadrant, right upper quadrant and near a phlegmon tissue in the right lower quadrant.  A transversus abdominis plane block was then performed using a mixture of 20 cc of Exparel, 30 cc of 0.25% bupivacaine plain and 10 cc of injectable saline.  12.5 cc was injected into the transversus abdominis plane in all 4 quadrants for a total of 50 cc given for the block.  The remaining 10 cc was used in the case for local infiltration.  2 additional 5 mm trocars inserted into the left upper quadrant left lower quadrant in usual fashion.      Attention was turned to the right side of the abdomen.  The phlegmon tissue on the right side of the abdomen was near the lateral portion of the cecum.  The appendix could not be definitively identified at this location.  The terminal ileum was adhered to this area but did dissected away from it without injury to the terminal ileum and this was not involved in the obvious phlegmon.  The phlegmon was identified peeled away but could not be safely done so although there was a large amount and rush of purulence from this area indicating an obvious perforation of this area and concern for both appendiceal and colonic perforation.  Given the inability to peeled the area away from the abdominal sidewall, decision made to convert to an open operation as this would likely require hemicolectomy.    The abdomen was then desufflated.  The supraumbilical port was removed and the incision was extended superiorly and inferiorly multiple cm at all levels.    The remaining ports were removed.  A wound protector was then placed. The abdomen was then explored.  There was obvious purulence throughout the abdominal cavity in all 4 quadrants and in the pelvis.  All this purulence was then suctioned.  There was an obvious thickening of the cecum and attachment to the lateral abdominal  sidewall and the appendix could not be definitively identified.  This was broken up to where the abscess cavity was entered into the abdominal sidewall and a large rush of purulence was again expressed from this area and suctioned.  Once this was peeled away, there was an obvious creating of the lateral portion of the cecum and ascending colon in this location that would require a resection.  As the appendix could not be identified, decision was made to perform a right hemicolectomy.  The colon was then mobilized off its lateral attachments at the normal areas all the way to level of the hepatic flexure.  The duodenum was protected throughout the mobilization of the hepatic flexure.  Once that had been mobilized around the hepatic flexure to the transverse colon where the lesser sac was entered above the transverse colon in between the omentum, the duodenum was identified at this location and protected as well.  The gallbladder was soft and normal.  The liver was also normal in appearance and without lesions.    The terminal ileum was dissected off its lateral attachments and mobilized onto the field.  Once this was completed, a mesenteric window was made beneath the terminal ileum just proximal to the ileocecal valve.  A CHANO 75 blue load stapler was used to transect the terminal ileum at this location.  A point on the transverse colon was chosen just past the hepatic flexure as this was well away the area perforation.  A mesenteric window was made at this location.  The transverse colon was then divided at this location using additional load of the CHANO 75 blue load stapler.  The ileocolic pedicle was then circumferentially dissected and taken via high ligation using the EnSeal device.  Care was taken to identify the duodenum during this process and protected during the taking.  Once this was taken the remaining mesentery of the right colon was then taken between the cut end of the terminal ileum and the stapled end  of the transverse colon in usual fashion with care taken to protect the surrounding structures.  Once this was taken with the EnSeal device, the right colon was free of the dissection plane and was passed off the field for final pathology.    The abdomen was then copiously irrigated all 4 quadrants.  All the purulence was suctioned again from these areas with multiple L of saline.  There was no further purulence at this time.  The omentum was then dissected off of the transverse colon will bit further.  The stapled ends of the transverse colon and terminal ileum were brought together for a side-to-side functional end-to-end stapled anastomosis.  The corners were excised.  An additional load of the CHANO 75 blue load was used to construct the common channel of the anastomosis.  The common defect was then closed using an additional load of the CHANO 75 blue load stapler.  The final staple line was then oversewn with a 2-0 Vicryl suture.  A 3-0 Vicryl suture was used to place a crotch stitch.  The anastomosis was widely patent and hemostatic.  The omentum which had been previously dissected off was then brought over top of the anastomosis and sutured in place in multiple locations using interrupted 3-0 Vicryl suture to buttress the anastomosis for the omental pedicle flap.  Once this was completed the abdomen was checked and found to be hemostatic in all 4 quadrants.  The abscess cavity along the right lateral abdominal sidewall was identified and a lateral portion of the abscess rind was taken for pathology in the event that this was cancerous as well.    Once this was completed the omentum was placed over the abdominal contents.  The wound protector was then removed.  A stab incision was made in the right upper quadrant and a 15 Wolof NANCY drain was brought through this defect into the abscess cavity for additional drainage.  It was sutured into place using a 2-0 silk suture.  The midline incision was then closed at the  fascial level using running #1 looped PDS suture.  The subcutaneous tissue and midline incision were then copiously irrigated made hemostatic.  The skin was then loosely stapled shut using a skin stapler leaving wide gaps in between were Betadine-soaked Telfa dionicio were placed.  The initial port sites were also closed with a skin stapler.  Surgical dressings were applied.  The patient was then awoken from general endotracheal anesthesia and taken to the postanesthesia care in stable condition.  He tolerated procedure well.  All sponge, needle and instrument counts were correct at the end of the case.    Significant Surgical Tasks Conducted by the Assistant(s), if Applicable: n/a    Complications: No    Estimated Blood Loss (EBL): 100mL           Implants: * No implants in log *    Specimens:   Specimen (24h ago, onward)       Start     Ordered    06/22/23 2215  Specimen to Pathology, Surgery General Surgery  Once        Comments: Pre-op Diagnosis: Perforated appendicitis [K35.32]Peritonitis [K65.9]Procedure(s):LAPAROSCOPY, DIAGNOSTIC, possible laparotomyLAPAROTOMYBLOCK, TRANSVERSUS ABDOMINIS PLANE Number of specimens: 3Name of specimens: 1)  right colon with perforation  --perm2)  abscess cavity  --perm3)  anastomosis  --perm     References:    Click here for ordering Quick Tip   Question Answer Comment   Procedure Type: General Surgery    Specimen Class: Routine/Screening    Which provider would you like to cc? KATIE ARGUETA        06/22/23 [id number]                            Condition: Good    Disposition: PACU - hemodynamically stable.    Attestation: I performed the procedure.

## 2023-06-23 NOTE — PROGRESS NOTES
[facility] - Telemetry ([facility])  [facility] Medicine  Progress Note    Patient Name: Jake Ortiz  MRN: [id number]  Patient Class: IP- Inpatient   Admission Date: 6/22/2023  Length of Stay: 0 days  Attending Physician: Priyanka Mazariegos MD  Primary Care Provider: Byron Stevens MD        Subjective:     Principal Problem:Perforated appendicitis        HPI:  Mr. Ortiz is a 84 yo male with hx of prostate cancer (not on active treatment), Afib on Eliquis, CHF, and CAD s/p CABG x 3 presented with abdominal pain that has gotten progressively worse over the last 6 weeks, but it was significantly worse this morning and therefore the wife brought him into the hospital.  On CT abdomen patient was found to have a perforated appendix and general surgery was consulted.  Dr. Moody recommended IR consulted for IR drain placement. Dr. Landin recommended CT with PO/IV contrast but due to renal function IV contrast cannot be given.  Patient is NPO and continues to have abdominal tenderness and guarding. Hospital medicine will admit to inpatient for perforated appendix, follow up on IR and GS recommendaitons and IV antibiotics.          Overview/Hospital Course:  Gen Surg evaluated and due to peritonitis, opted to take patient to diagnostic laparoscopy on 06/22 which was then converted to an ex-lap. Pt was found to have walled off perforation in R abdomen near appendix/cecum with intraabdominal purulence, underwent R hemicolectomy.       Interval History: Per RN,pt had some bloody output from NG tube overnight. Hgb stable. Pt seen with wife at bedside. He reports he feels tired, continues to have pain diffusely, is unsure if morphine PCA is helping. Denies nausea, vomiting. Report dyspnea with transfers in bed, no CP. Currently on 3L NC O2.     Review of Systems  Objective:     Vital Signs (Most Recent):  Temp: 98.6 °F (37 °C) (06/23/23 0706)  Pulse: 62 (06/23/23 0706)  Resp: 18 (06/23/23 0706)  BP: (!) 130/58 (06/23/23 0706)  SpO2: 98  % (06/23/23 0706) Vital Signs (24h Range):  Temp:  [97.9 °F (36.6 °C)-99.9 °F (37.7 °C)] 98.6 °F (37 °C)  Pulse:  [50-86] 62  Resp:  [12-38] 18  SpO2:  [95 %-99 %] 98 %  BP: (103-197)/(53-74) 130/58     Weight: 91.3 kg (201 lb 4.5 oz)  Body mass index is 28.88 kg/m².    Intake/Output Summary (Last 24 hours) at 6/23/2023 1049  Last data filed at 6/23/2023 0710  Gross per 24 hour   Intake 1610 ml   Output 940 ml   Net 670 ml         Physical Exam  Vitals and nursing note reviewed.   Constitutional:       General: He is not in acute distress.     Appearance: He is ill-appearing.   HENT:      Head: Normocephalic and atraumatic.      Comments: NGT in place with bilious output noted   Cardiovascular:      Rate and Rhythm: Normal rate and regular rhythm.      Heart sounds: No murmur heard.    No friction rub. No gallop.   Pulmonary:      Comments: Shallow effort due to pain, no WRR noted, no increased work of breathing, on 3L NC O2   Abdominal:      Comments: Soft, bowel sounds hypoactive, NANCY drain with serosanguinous output, midline incision with dressing in place, abd binder in place    Musculoskeletal:      Right lower leg: No edema.      Left lower leg: No edema.   Neurological:      Mental Status: He is alert and oriented to person, place, and time. Mental status is at baseline.           Significant Labs: All pertinent labs within the past 24 hours have been reviewed.    Significant Imaging: I have reviewed all pertinent imaging results/findings within the past 24 hours.      Assessment/Plan:      * Perforated appendicitis  - CT abdomen with PO contrast: Unchanged air fluid collection adjacent to the cecum.  No oral contrast is seen within this collection. Increasing airspace opacities within the lower lobes bilaterally suggesting atelectasis or pneumonia.   - Gen surg consulted- s/p ex-lap with R hemicolectomy on 06/22/23  - NPO, NGT per surgery   - Continue IV Zosyn   - Currently on Morphine PCA for pain control,  will wean off latera today if ok with surgery   - continue gentle IVF (decrease rate given hx of CHF)       Chronic combined systolic and diastolic congestive heart failure  Patient is identified as having Combined Systolic and Diastolic heart failure that is Chronic. CHF is currently controlled. Latest ECHO performed and demonstrates- Results for orders placed during the hospital encounter of 05/10/23    Echo    Interpretation Summary  · Eccentric hypertrophy and low normal systolic function.  · Moderate left atrial enlargement.  · The estimated ejection fraction is 50%.  · Indeterminate left ventricular diastolic function.  · There is abnormal septal wall motion consistent with left bundle branch block.  · Normal right ventricular size with normal right ventricular systolic function.  · Mild-to-moderate mitral regurgitation.  · Mild to moderate tricuspid regurgitation.  - hold home diuretic, ARB while NPO   - strict Is and Os   - Judicious IV fluids while NPO     Atrial fibrillation with chronic anticoagulation with Eliquis  Patient with Permanent atrial fibrillation which is controlled currently with no medication. Patient is currently in sinus rhythm.[id number] Score: 4.. Anticoagulation indicated. Anticoagulation done with Eliquis.  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  - monitor CBC   - transfuse for Hgb < 8 or if pt symptomatic     Stage 4 chronic kidney disease  Cr 1.4-1.7 recently   Appears to be close to baseline   Monitor BMP; renally dose meds   Avoid nephrotoxic agents     S/P CABG x 3  - Patient on Eliquis - last dose 6/21 PM  - Hold Eliquis until cleared from a surgical standpoint to do so   - Cardiology consulted for preop eval       Hyperlipemia  - resume statin on discharge      Hypertension  - hold PO meds  - monitor BP   - IV hydralazine as needed         VTE  Risk Mitigation (From admission, onward)         Ordered     IP VTE HIGH RISK PATIENT  Once         06/22/23 1405     Place sequential compression device  Until discontinued         06/22/23 1405                Discharge Planning   LITTLE:      Code Status: Full Code   Is the patient medically ready for discharge?: No    Reason for patient still in hospital (select all that apply): Patient trending condition, Treatment and Consult recommendations                     Priyanka Mazariegos MD  Department of Hospital Medicine   [facility] ([facility])

## 2023-06-23 NOTE — PROGRESS NOTES
[facility] - Telemetry ([facility])  Colorectal Surgery  Progress Note    Subjective:     History of Present Illness:  83 y.o. male with PMHx of CHF, CAD, DM, HTN who presented to the ED for generalized abdominal pain.  Describes the pain as starting around 6:00 a.m. this morning after having a bowel movement.  The pain was sharp and constant.  Presented to the ER where CT scan was shown concerning for extraluminal air fluid collection near where the appendix should be near the cecum concerning for possible perforation/abscess.  Surgery consulted for evaluation.  Per the patient and his wife, the patient has had intermittent abdominal pain and diarrhea over the past 6 weeks but the pain this morning is different than anything he is ever experienced.  Denies previous abdominal surgical history.  Is on Eliquis per Cardiology.      Post-Op Info:  Procedure(s) (LRB):  LAPAROSCOPY, DIAGNOSTIC (N/A)  LAPAROTOMY, EXPLORATORY (N/A)  HEMICOLECTOMY, RIGHT (N/A)  BIOPSY, ABDOMINAL WALL (N/A)  BLOCK, TRANSVERSUS ABDOMINIS PLANE (N/A)   1 Day Post-Op     Interval History: PCA well controlling pain. NANCY with SS drainage. No n/v. NG tube with bilious output. No BM or flatus at this time.    Medications:  Continuous Infusions:   sodium chloride 0.9% 75 mL/hr at 06/22/23 1534    morphine       Scheduled Meds:   fluticasone propionate  2 spray Each Nostril Daily    miconazole NITRATE 2 %   Topical (Top) BID    pantoprazole  40 mg Intravenous Daily    piperacillin-tazobactam (Zosyn) IV (PEDS and ADULTS) (extended infusion is not appropriate)  4.5 g Intravenous Q8H     PRN Meds:acetaminophen, hydrALAZINE, naloxone, prochlorperazine, sodium chloride 0.9%     Review of patient's allergies indicates:  No Known Allergies  Objective:     Vital Signs (Most Recent):  Temp: 98.6 °F (37 °C) (06/23/23 0706)  Pulse: 62 (06/23/23 0706)  Resp: 18 (06/23/23 0706)  BP: (!) 130/58 (06/23/23 0706)  SpO2: 98 % (06/23/23 0706) Vital Signs (24h  Range):  Temp:  [97.9 °F (36.6 °C)-99.9 °F (37.7 °C)] 98.6 °F (37 °C)  Pulse:  [50-86] 62  Resp:  [12-38] 18  SpO2:  [95 %-99 %] 98 %  BP: (103-197)/(53-74) 130/58     Weight: 91.3 kg (201 lb 4.5 oz)  Body mass index is 28.88 kg/m².    Intake/Output - Last 3 Shifts         06/21 0700  06/22 0659 06/22 0700 06/23 0659 06/23 0700 06/24 0659    I.V. (mL/kg)   10 (0.1)    IV Piggyback  1600     Total Intake(mL/kg)  1600 (17.5) 10 (0.1)    Urine (mL/kg/hr)  700     Drains   90    Blood  150     Total Output  850 90    Net  +750 -80                    Physical Exam  Vitals and nursing note reviewed.   Constitutional:       General: He is not in acute distress.     Appearance: Normal appearance. He is ill-appearing. He is not toxic-appearing.   HENT:      Head: Normocephalic.      Right Ear: External ear normal.      Left Ear: External ear normal.      Nose: Nose normal.   Eyes:      Conjunctiva/sclera: Conjunctivae normal.   Cardiovascular:      Rate and Rhythm: Normal rate.   Pulmonary:      Effort: Pulmonary effort is normal. No respiratory distress.   Abdominal:      Comments: Soft, +mild distention, appropriate TTP, NANCY with SS drainage, Midline incision with dressing that is clean and dry. Abdominal binder in place.    Skin:     General: Skin is warm and dry.   Neurological:      Mental Status: He is alert and oriented to person, place, and time.   Psychiatric:         Mood and Affect: Mood normal.         Behavior: Behavior normal.        Significant Labs:  I have reviewed all pertinent lab results within the past 24 hours.  CBC:   Recent Labs   Lab 06/23/23  0457   WBC 13.51*   RBC 3.60*   HGB 10.4*   HCT 33.2*      MCV 92   MCH 28.9   MCHC 31.3*     BMP:   Recent Labs   Lab 06/23/23  0457   *      K 3.9      CO2 24   BUN 37*   CREATININE 1.8*   CALCIUM 7.6*       Significant Diagnostics:  I have reviewed all pertinent imaging results/findings within the past 24 hours.    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

## 2023-06-23 NOTE — CONSULTS
06/23/23 0720   WOCN Assessment   WOCN Total Time (mins) 30   Visit Date 06/23/23   Visit Time 0720   Consult Type New   WOCN Speciality Wound   Wound yeast   Intervention assessed     Appears to have a red rash area to left groin. Groin areas cleaned, dried and will message provider to order a cream or powder for possible yeast. Heel boots also applied, wedge already at bedside for turning every 2 hours.

## 2023-06-23 NOTE — PLAN OF CARE
Pt resting on bed s/p diag lap, laparotomy,with tap block performed under general anesthesia by Dr. Moody. Respirations even and unlabored on 3L O2 via NC with O2 sats of 96%. VSS. Will cont to monitor. See flow sheet for detailed assessment.

## 2023-06-23 NOTE — ASSESSMENT & PLAN NOTE
Patient with Permanent atrial fibrillation which is controlled currently with no medication. Patient is currently in sinus rhythm.[id number] Score: 4.. Anticoagulation indicated. Anticoagulation done with Eliquis.  - Eliquis on hold given surgical intervention   - resume anticoag when cleared from a surgical standpoint to do so

## 2023-06-23 NOTE — HOSPITAL COURSE
Gen Surg evaluated and due to peritonitis, opted to take patient to diagnostic laparoscopy on  which was then converted to an ex-lap. Pt was found to have walled off perforation in R abdomen near appendix/cecum with intraabdominal purulence, underwent R hemicolectomy. Postop, NGT remains in place, awaiting return of bowel function.     : cont npo per surgery. Fluids changed to d5w 1/2ns. Cont zosyn.     : CLD started by surgery. Cont zosyn.     : Patient noted to vomiting this morning about 10 hours after an italian icee.  Surgery recommended continuing CLD.  Patient had a run of TransitScreen last night and passed out.  Cardiology consulted.    : Code blue called this AM, Patient with asystole noted 0448, code blue was called, CPR started. Patient recieved epi, bicarb, amiodarone 300 and 150 mg, 3 shocks. Initial rhtyhem v-fib, converted to junctional PEA followed by ROSC. Patient transferred to ICU, lost pusle again. Wife aware of poor prognosis, decision made to stop further CPR. Patient  2023, pronounced dead 5:38 AM.

## 2023-06-23 NOTE — ANESTHESIA PROCEDURE NOTES
Intubation    Date/Time: 6/22/2023 8:01 PM  Performed by: Victor Hugo Walker CRNA  Authorized by: Aram Britt MD     Intubation:     Induction:  Rapid sequence induction    Intubated:  Postinduction    Mask Ventilation:  Not attempted    Attempts:  1    Attempted By:  CRNA    Method of Intubation:  Direct    Blade:  Chio 3    Laryngeal View Grade: Grade I - full view of cords      Difficult Airway Encountered?: No      Complications:  None    Airway Device:  Oral endotracheal tube    Airway Device Size:  8.0    Style/Cuff Inflation:  Cuffed    Tube secured:  23    Secured at:  The lips    Placement Verified By:  Capnometry    Complicating Factors:  None    Findings Post-Intubation:  BS equal bilateral

## 2023-06-23 NOTE — ASSESSMENT & PLAN NOTE
- Patient on Eliquis - last dose 6/21 PM  - Hold Eliquis until cleared from a surgical standpoint to do so   - Cardiology consulted for preop eval

## 2023-06-23 NOTE — PLAN OF CARE
[facility] - Telemetry (Hospital)  Initial Discharge Assessment       Primary Care Provider: Byron Stveens MD    Admission Diagnosis: Generalized abdominal pain [R10.84]  Perforated appendicitis [K35.32]    Admission Date: 6/22/2023  Expected Discharge Date:     Transition of Care Barriers: None    Payor: [employer]ARE / Plan: [employer] CHOICES 65 / Product Type: Medicare Advantage /     Extended Emergency Contact Information  Primary Emergency Contact: Xuan Ortiz  Address: [address]           [place] [address] [facility]  Home Phone: [phone]  Work Phone: [phone]  Mobile Phone: [phone]  Relation: Spouse    Discharge Plan A: Home Health, Home with family         [id number] - [place] - [address]  [address]  [facility] [address]  Phone: [phone] Fax: [phone]    [facility] ([facility] ) - [place], [place] - [address]  [address]  [address]  [facility] [address]  Phone: [phone] Fax: [phone]      Initial Assessment (most recent)       Adult Discharge Assessment - 06/23/23 1350          Discharge Assessment    Assessment Type Discharge Planning Assessment     Confirmed/corrected address, phone number and insurance Yes     Confirmed Demographics Correct on Facesheet     Source of Information patient     Communicated LITTLE with patient/caregiver Date not available/Unable to determine     Reason For Admission Perforated appendicitis     People in Home spouse     Facility Arrived From: Home     Do you expect to return to your current living situation? Yes     Do you have help at home or someone to help you manage your care at home? Yes     Who are your caregiver(s) and their phone number(s)? Pts spouseXuan     Prior to hospitilization cognitive status: Alert/Oriented     Current cognitive status: Alert/Oriented      Equipment Currently Used at Home walker, rolling     Readmission within 30 days? Yes     Patient currently being followed by outpatient case management? No     Do you currently have service(s) that help you manage your care at home? Yes     Name and Contact number of agency [facility]  (has not started services)     Is the pt/caregiver preference to resume services with current agency Yes     Do you take prescription medications? Yes     Do you have prescription coverage? Yes     Do you have any problems affording any of your prescribed medications? No     Is the patient taking medications as prescribed? yes     Who is going to help you get home at discharge? Pts spouse     How do you get to doctors appointments? family or friend will provide     Are you on dialysis? No     Do you take coumadin? No     Discharge Plan A Home Health;Home with family     DME Needed Upon Discharge  none     Discharge Plan discussed with: Patient;Spouse/sig other     Transition of Care Barriers None                     Readmission Assessment (most recent)       Readmission Assessment - 06/23/23 3715          Readmission    Why were you hospitalized in the last 30 days? Sent by PCP for CHF work up     Why were you readmitted? New medical problem     When you left the hospital how did you feel? Ok     When you left the hospital where did you go? Home with Home Health     Did patient/caregiver refused recommended DC plan? No     Tell me about what happened between when you left the hospital and the day you returned. Pt came back to hospital d/t progressively worsening abdominal pain     Did you have  a follow-up appointment on discharge? Yes     Did you go? Yes                    SW met with patient and spouse at bedside. Pt and spouse live together. Pt reports being independent until a few weeks ago. Pt's spouse is help at home. Pt ambulates with a walker. Pt was established with [facility]  following discharge from [facility]  SNF earlier this month. [facility] has been unable to admit patient d/t hospital admissions. Family agreeable to continuing care with agency once discharged.     CM needs TBD by hospital progress.   Pt's whiteboard updated to reflect CM contact information.   SW to remain available

## 2023-06-24 NOTE — ASSESSMENT & PLAN NOTE
82yo F with perforation/abscess/plegmon in the RLQ concerning for perforation of appendix vs cecum vs small bowel s/p open [facility] 06/23/23    - continue IV abx  - continue NG tube, NPO  - awaiting bowel function return  - PCA for pain  - incentive spirometry  - encouraged OOB, ambulation

## 2023-06-24 NOTE — SUBJECTIVE & OBJECTIVE
Interval History: NAEON. Pt seen sitting up in chair after working with PT. He reports some incisional pain, controlled with PCA. Has not used PCA much. Reports nausea but no vomiting. No BM or flatus yet.     Review of Systems  Objective:     Vital Signs (Most Recent):  Temp: 97.8 °F (36.6 °C) (06/24/23 1251)  Pulse: 63 (06/24/23 1251)  Resp: 17 (06/24/23 1251)  BP: (!) 160/72 (06/24/23 1251)  SpO2: (!) 92 % (06/24/23 1251) Vital Signs (24h Range):  Temp:  [97.8 °F (36.6 °C)-98.5 °F (36.9 °C)] 97.8 °F (36.6 °C)  Pulse:  [59-82] 63  Resp:  [16-30] 17  SpO2:  [92 %-99 %] 92 %  BP: (120-160)/(56-72) 160/72     Weight: 91.6 kg (201 lb 15.1 oz)  Body mass index is 28.98 kg/m².    Intake/Output Summary (Last 24 hours) at 6/24/2023 1345  Last data filed at 6/24/2023 1100  Gross per 24 hour   Intake 16 ml   Output 330 ml   Net -314 ml         Physical Exam  Vitals and nursing note reviewed.   Constitutional:       General: He is not in acute distress.     Appearance: He is ill-appearing.   HENT:      Head:      Comments: NGT  Cardiovascular:      Rate and Rhythm: Normal rate and regular rhythm.      Heart sounds: No murmur heard.    No friction rub. No gallop.   Pulmonary:      Breath sounds: No wheezing, rhonchi or rales.      Comments: Diminished breath sounds bilaterally, shallow effort, no increased work of breathing, on 2L NC O2   Abdominal:      Palpations: Abdomen is soft.      Comments: Abd binder in place, bowel sounds hypoactive, NANCY drain with serousanguinous output   Genitourinary:     Comments: Serra in place   Musculoskeletal:      Right lower leg: No edema.      Left lower leg: No edema.   Neurological:      Mental Status: He is alert and oriented to person, place, and time. Mental status is at baseline.           Significant Labs: All pertinent labs within the past 24 hours have been reviewed.    Significant Imaging: I have reviewed all pertinent imaging results/findings within the past 24 hours.

## 2023-06-24 NOTE — PLAN OF CARE
Initial PT eval completed. Patient ambulated 15 feet x2, Min A with RW. Recommend HHPT and 24/7 supervision.

## 2023-06-24 NOTE — PT/OT/SLP EVAL
Occupational Therapy Evaluation and Treatment    Name: Jake Ortiz  MRN: [id number]  Admitting Diagnosis: Perforated appendicitis  Recent Surgery: Procedure(s) (LRB):  LAPAROSCOPY, DIAGNOSTIC (N/A)  LAPAROTOMY, EXPLORATORY (N/A)  HEMICOLECTOMY, RIGHT (N/A)  BIOPSY, ABDOMINAL WALL (N/A)  BLOCK, TRANSVERSUS ABDOMINIS PLANE (N/A) 2 Days Post-Op    Recommendations:     Discharge Recommendations: home health OT  Level of Assistance Recommended: 24 hours light assistance  Discharge Equipment Recommendations: none  Barriers to discharge: None    Assessment:     Jake Ortiz is a 83 y.o. male with a medical diagnosis of Perforated appendicitis. He presents with performance deficits affecting function including weakness, impaired endurance, impaired self care skills, impaired functional mobility, gait instability, impaired balance, decreased upper extremity function, decreased lower extremity function, decreased safety awareness, pain. SELF CARE DEBILITY    Rehab Prognosis: Good; patient would benefit from acute OT services to address these deficits and reach maximum level of function.    Plan:     Patient to be seen 2 x/week to address the above listed problems via self-care/home management, therapeutic activities, therapeutic exercises  Plan of Care Expires: 07/08/23  Plan of Care Reviewed with: patient, spouse    Subjective     Chief Complaint: DIFFICULTY MOVING WITH MULT LINES.  Patient Comments/Goals: TO RETURN HOME.  Pain/Comfort:  Pain Rating 1: 0/10    Patients cultural, spiritual, Mormon conflicts given the current situation:      Social History:  Living Environment: Patient lives with their spouse in  [facility]  with  [facility].  Prior Level of Function: Prior to admission, patient was modified independent  Roles and Routines: Patient was driving prior to admission.  Equipment Used at Home: walker, rolling, bath bench, rollator, grab bar  DME owned (not currently used):  [employer] WHICH  WIFE DOES NOT LIKE, rolling walker, rollator, and bath bench  Assistance Upon Discharge: significant other    Objective:     Communicated with NURSE DAVIS prior to session. Patient found supine with NANCY drain, NG tube, telemetry, peripheral IV, PCA, londono catheter upon OT entry to room.    General Precautions: Standard, fall   Orthopedic Precautions: N/A   Braces: N/A    Respiratory Status: Nasal cannula, flow 2 L/min    Occupational Performance    Gait belt applied - Yes    Bed Mobility:   Scooting anteriorly to EOB to have both feet planted on floor: minimum assistance  Supine to sit from right side of bed with minimum assistance    Functional Mobility/Transfers:  Sit <> Stand Transfer with minimum assistance with rolling walker  Bed <> Chair Transfer using Stand Pivot technique with minimum assistance with rolling walker  Functional Mobility: MIN (A) WITH RW    Activities of Daily Living:  Upper Body Dressing: DIFFICULT TO ASSESS DUE TO MULT LINES  Lower Body Dressing: stand by assistance TO DON/DOFF SOCKS    Cognitive/Visual Perceptual:  NO DEFICITS NOTED.    Physical Exam:  Balance:    -     Standing: minimum assistance  Upper Extremity Range of Motion:     -       Right Upper Extremity: LIMITED DUE TO MULT LINES  -       Left Upper Extremity: LIMITED DUE TO MULT LINES    AMPAC 6 Click ADL:  AMPAC Total Score: 18    Treatment & Education:  Therapist provided facilitation and instruction of proper body mechanics, energy conservation, and fall prevention strategies during tasks listed above  Patient educated on role of OT, POC, and goals for therapy  Patient educated on importance of OOB activities with staff member assistance and sitting OOB majority of the day        Patient clear to stand pivot transfer with RN/PCT, assist xMIN (A) .    Patient left up in chair with all lines intact, call button in reach, and significant other present.    GOALS:   Multidisciplinary Problems       Occupational Therapy Goals           Problem: Occupational Therapy    Goal Priority Disciplines Outcome Interventions   Occupational Therapy Goal     OT, PT/OT     Description: LTG'S TO BE MET IN 14 DAYS (7/8/23)    1)  PATIENT WILL PERFORM UB DRESSING WITH (I) TO DECREASE (D) ON CAREGIVER.    2)  PATIENT WILL PERFORM LB DRESSING WITH SBA TO DECREASE (D) ON CAREGIVER..    3)  PATIENT WILL PERFORM TOILET T/F WITH SBA TO DECREASE (D) ON CAREGIVER..    4)  PATIENT WILL STAND AT SINK X5-X10 MIN TO PERFORM SIMPLE GROOMING/HYGIENE WITH (S) FOR BALANCE.                       History:     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    #4 PROSTATE BIOPSY, LEFT APEX: ADENOCARCINOMA, FARHAD GRADE 3 + 3 = 6, IN 1 of 2 CORES 9/8/2014    Anemia     Arthritis     Atrial fibrillation 10/19/2017    Back pain     Congestive heart failure 03/05/2018    Coronary artery disease     DM (diabetes mellitus) 2018     am 06/23/2020    DM (diabetes mellitus) 2016     am 08/17/2021    Hyperlipemia     Hypertension     Myocardial infarction     NASREEN (obstructive sleep apnea) 06/05/2018    Prostate cancer 2015    Tobacco dependence     Type 2 diabetes mellitus          Past Surgical History:   Procedure Laterality Date    BIOPSY OF ABDOMINAL WALL N/A 6/22/2023    Procedure: BIOPSY, ABDOMINAL WALL;  Surgeon: Victor Hugo Moody MD;  Location: [facility] OR;  Service: General;  Laterality: N/A;    COLONOSCOPY N/A 9/22/2020    Procedure: COLONOSCOPY;  Surgeon: Javier Farmer MD;  Location: [facility] ENDO;  Service: Endoscopy;  Laterality: N/A;    CORONARY ARTERY BYPASS GRAFT  1987    DIAGNOSTIC LAPAROSCOPY N/A 6/22/2023    Procedure: LAPAROSCOPY, DIAGNOSTIC;  Surgeon: Victor Hugo Moody MD;  Location: [facility] OR;  Service: General;  Laterality: N/A;  dx lap converted to ex lap  converted to ex lap at 2028      EPIDURAL STEROID INJECTION N/A 11/22/2019    Procedure: Lumbar L5/S1 IL XUAN;  Surgeon: Tacho Trivedi MD;   Location: [facility] PAIN MGT;  Service: Pain Management;  Laterality: N/A;    EPIDURAL STEROID INJECTION N/A 1/6/2020    Procedure: Lumbar L5/S1 IL XUAN;  Surgeon: Tacho Trivedi MD;  Location: [facility] PAIN MGT;  Service: Pain Management;  Laterality: N/A;    EPIDURAL STEROID INJECTION N/A 2/10/2020    Procedure: Caudal XUAN;  Surgeon: Tacho Trivedi MD;  Location: [facility] PAIN MGT;  Service: Pain Management;  Laterality: N/A;    ESOPHAGOGASTRODUODENOSCOPY N/A 9/22/2020    Procedure: EGD (ESOPHAGOGASTRODUODENOSCOPY);  Surgeon: Javier Farmer MD;  Location: [facility];  Service: Endoscopy;  Laterality: N/A;    INJECTION OF ANESTHETIC AGENT AROUND MEDIAL BRANCH NERVES INNERVATING LUMBAR FACET JOINT Bilateral 10/12/2020    Procedure: Bilateral L3-5 MBB;  Surgeon: Tacho Trivedi MD;  Location: [facility] PAIN MGT;  Service: Pain Management;  Laterality: Bilateral;    INJECTION OF ANESTHETIC AGENT AROUND MEDIAL BRANCH NERVES INNERVATING LUMBAR FACET JOINT Bilateral 12/21/2020    Procedure: Bilateral L3-5 MBB with steroid;  Surgeon: Tacho Trivedi MD;  Location: [facility] PAIN MGT;  Service: Pain Management;  Laterality: Bilateral;    INJECTION OF ANESTHETIC AGENT INTO TISSUE PLANE DEFINED BY TRANSVERSUS ABDOMINIS MUSCLE N/A 6/22/2023    Procedure: BLOCK, TRANSVERSUS ABDOMINIS PLANE;  Surgeon: Victor Hugo Moody MD;  Location: [facility];  Service: General;  Laterality: N/A;    INSERTION OF SPINAL NEUROSTIMULATOR N/A 3/23/2023    Procedure: INSERTION, NEUROSTIMULATOR, SPINAL;  Surgeon: Philipp Demarco MD;  Location: [facility] OR;  Service: Pain Management;  Laterality: N/A;    INTRALUMINAL GASTROINTESTINAL TRACT IMAGING VIA CAPSULE N/A 12/29/2021    Procedure: IMAGING PROCEDURE, GI TRACT, INTRALUMINAL, VIA CAPSULE;  Surgeon: Tahir Geronimo RN;  Location: [facility] ENDO;  Service: Endoscopy;  Laterality: N/A;    LAPAROTOMY, EXPLORATORY N/A 6/22/2023    Procedure: LAPAROTOMY, EXPLORATORY;  Surgeon: Victor Hugo Moody MD;  Location: [facility] OR;   Service: General;  Laterality: N/A;    PROSTATE SURGERY      prostate radiation    RADIOFREQUENCY THERMOCOAGULATION Left 6/4/2021    Procedure: Left L3-5 Lumbar RFA;  Surgeon: Tacho Trivedi MD;  Location: [facility] PAIN MGT;  Service: Pain Management;  Laterality: Left;    RADIOFREQUENCY THERMOCOAGULATION Right 6/18/2021    Procedure: Right L3-5 Lumbar RFA;  Surgeon: Tacho Trivedi MD;  Location: [facility] PAIN MGT;  Service: Pain Management;  Laterality: Right;    REPLACEMENT OF PACEMAKER GENERATOR Left 6/16/2022    Procedure: REPLACEMENT, PULSE GENERATOR, CARDIAC PACEMAKER/CRT-P device;  Surgeon: Darrel Carrero MD;  Location: [facility] CATH LAB;  Service: Cardiology;  Laterality: Left;  NOT MRI safe   Pacer and leads implanted 9/30/2017, Dr. Souleymane SANTACRUZ Consulta CRT-P [id number], [id number]   A lead: Jovan 5076 CapSureFix® Novus, [id number]   RV lead: Mdt 5076 CapSureFix® Novus, [id number]   LV lead: Jovan 4196 At    RIGHT HEMICOLECTOMY N/A 6/22/2023    Procedure: HEMICOLECTOMY, RIGHT;  Surgeon: Victor Hugo Moody MD;  Location: [facility] OR;  Service: General;  Laterality: N/A;    SELECTIVE INJECTION OF ANESTHETIC AGENT AROUND LUMBAR SPINAL NERVE ROOT BY TRANSFORAMINAL APPROACH Bilateral 6/8/2022    Procedure: Bilateral L3/4 TF XUAN with RN IV sedation;  Surgeon: Philipp Demarco MD;  Location: [facility] PAIN MGT;  Service: Pain Management;  Laterality: Bilateral;    SPINE SURGERY      fusion    TONSILLECTOMY      TRIAL OF SPINAL CORD NERVE STIMULATOR N/A 1/25/2023    Procedure: Trial, Neurostimulator, Spinal Cord with RN IV sedation;  Surgeon: Philipp Demarco MD;  Location: [facility] PAIN MGT;  Service: Pain Management;  Laterality: N/A;       Time Tracking:     OT Date of Treatment: 06/24/23  OT Start Time: 0902  OT Stop Time: 0925  OT Total Time (min): 23 min    Billable Minutes: Evaluation 10 and Therapeutic Activity 13    6/24/2023

## 2023-06-24 NOTE — NURSING
Received lying in bed awake and alert, assessment per flowsheet, NGT to right nare intact, PCA pump infusing Morphine. Plan of care discussed. Bed low, call light within reach. Will continue to monitor.

## 2023-06-24 NOTE — ASSESSMENT & PLAN NOTE
- CT abdomen with PO contrast: Unchanged air fluid collection adjacent to the cecum.  No oral contrast is seen within this collection. Increasing airspace opacities within the lower lobes bilaterally suggesting atelectasis or pneumonia.   - Gen surg consulted- s/p ex-lap with R hemicolectomy on 06/22/23  - Discussed with surgery Dr. Church- continue NPO, NGT for now pending return of bowel function   - Continue IV Zosyn   - stop morphine PCA, transition to IV Morphine q4h PRN   - continue gentle IVF while NPO

## 2023-06-24 NOTE — PROGRESS NOTES
[facility] [place] [facility]")  General Surgery  Progress Note    Subjective:     History of Present Illness:  83 y.o. male with PMHx of CHF, CAD, DM, HTN who presented to the ED for generalized abdominal pain.  Describes the pain as starting around 6:00 a.m. this morning after having a bowel movement.  The pain was sharp and constant.  Presented to the ER where CT scan was shown concerning for extraluminal air fluid collection near where the appendix should be near the cecum concerning for possible perforation/abscess.  Surgery consulted for evaluation.  Per the patient and his wife, the patient has had intermittent abdominal pain and diarrhea over the past 6 weeks but the pain this morning is different than anything he is ever experienced.  Denies previous abdominal surgical history.  Is on Eliquis per Cardiology.      Post-Op Info:  Procedure(s) (LRB):  LAPAROSCOPY, DIAGNOSTIC (N/A)  LAPAROTOMY, EXPLORATORY (N/A)  HEMICOLECTOMY, RIGHT (N/A)  BIOPSY, ABDOMINAL WALL (N/A)  BLOCK, TRANSVERSUS ABDOMINIS PLANE (N/A)   2 Days Post-Op     Interval History:  Pain well controlled. NANCY with SS drainage. No n/v. NG tube with bilious output. No BM or flatus at this time.    Medications:  Continuous Infusions:   sodium chloride 0.9% 50 mL/hr at 06/23/23 1111     Scheduled Meds:   fluticasone propionate  2 spray Each Nostril Daily    miconazole NITRATE 2 %   Topical (Top) BID    mupirocin   Nasal BID    pantoprazole  40 mg Intravenous Daily    piperacillin-tazobactam (Zosyn) IV (PEDS and ADULTS) (extended infusion is not appropriate)  4.5 g Intravenous Q8H     PRN Meds:acetaminophen, hydrALAZINE, morphine, naloxone, prochlorperazine, sodium chloride 0.9%     Review of patient's allergies indicates:  No Known Allergies  Objective:     Vital Signs (Most Recent):  Temp: 98.1 °F (36.7 °C) (06/24/23 0721)  Pulse: 64 (06/24/23 0829)  Resp: (!) 22 (06/24/23 0829)  BP: (!) 140/63 (06/24/23 0721)  SpO2: 99 % (06/24/23 0829) Vital  Signs (24h Range):  Temp:  [97.8 °F (36.6 °C)-98.5 °F (36.9 °C)] 98.1 °F (36.7 °C)  Pulse:  [58-82] 64  Resp:  [16-30] 22  SpO2:  [95 %-99 %] 99 %  BP: (118-144)/(56-65) 140/63     Weight: 91.6 kg (201 lb 15.1 oz)  Body mass index is 28.98 kg/m².    Intake/Output - Last 3 Shifts         06/22 0700  06/23 0659 06/23 0700 06/24 0659 06/24 0700 06/25 0659    I.V. (mL/kg)  26 (0.3)     IV Piggyback 1600      Total Intake(mL/kg) 1600 (17.5) 26 (0.3)     Urine (mL/kg/hr) 700 300 (0.1)     Drains  170     Blood 150      Total Output 850 470     Net +750 -444                    Physical Exam  Vitals and nursing note reviewed.   Constitutional:       General: He is not in acute distress.     Appearance: Normal appearance. He is ill-appearing. He is not toxic-appearing.   HENT:      Head: Normocephalic.      Right Ear: External ear normal.      Left Ear: External ear normal.      Nose: Nose normal.   Eyes:      Conjunctiva/sclera: Conjunctivae normal.   Cardiovascular:      Rate and Rhythm: Normal rate.   Pulmonary:      Effort: Pulmonary effort is normal. No respiratory distress.   Abdominal:      Comments: Soft, +mild distention, appropriate TTP, NANCY with SS drainage, midline incision dionicio removed, staples intact clean and dry   Skin:     General: Skin is warm and dry.   Neurological:      Mental Status: He is alert and oriented to person, place, and time.   Psychiatric:         Mood and Affect: Mood normal.         Behavior: Behavior normal.        Significant Labs:  I have reviewed all pertinent lab results within the past 24 hours.  CBC:   Recent Labs   Lab 06/24/23  0438   WBC 14.14*   RBC 3.28*   HGB 9.4*   HCT 30.4*      MCV 93   MCH 28.7   MCHC 30.9*       BMP:   Recent Labs   Lab 06/24/23  0438   *      K 3.7      CO2 23   BUN 45*   CREATININE 1.9*   CALCIUM 8.1*         Significant Diagnostics:  I have reviewed all pertinent imaging results/findings within the past 24  hours.    Assessment/Plan:     * Perforated appendicitis  84yo F with perforation/abscess/plegmon in the RLQ concerning for perforation of appendix vs cecum vs small bowel s/p open RHC 06/23/23    - continue IV abx  - continue NG tube, NPO  - awaiting bowel function return  - PCA for pain  - incentive spirometry  - encouraged OOB, ambulation    Anemia associated with stage 4 chronic renal failure  Care per primary team    Chronic combined systolic and diastolic congestive heart failure  Care per primary team      Atrial fibrillation with chronic anticoagulation with Eliquis  Care per primary team    S/P CABG x 3  Care per primary team    Hyperlipemia  Care per primary team    Hypertension  Care per primary team        Jj Church MD  General Surgery  [facility] - Telemetry ([facility])

## 2023-06-24 NOTE — PT/OT/SLP EVAL
"Physical Therapy Evaluation    Patient Name:  Jake Ortiz   MRN:  [id number]    Recommendations:     Discharge Recommendations: home health PT and 24/7 supervision   Discharge Equipment Recommendations: none   Barriers to discharge: None    Assessment:     Jake Ortiz is a 83 y.o. male admitted with a medical diagnosis of Perforated appendicitis.  He presents with the following impairments/functional limitations: weakness, impaired endurance, decreased ROM, decreased coordination, impaired self care skills, decreased upper extremity function, impaired functional mobility, decreased lower extremity function, gait instability, decreased safety awareness, impaired balance, impaired cardiopulmonary response to activity.    Rehab Prognosis: Good; patient would benefit from acute skilled PT services to address these deficits and reach maximum level of function.    Recent Surgery: Procedure(s) (LRB):  LAPAROSCOPY, DIAGNOSTIC (N/A)  LAPAROTOMY, EXPLORATORY (N/A)  HEMICOLECTOMY, RIGHT (N/A)  BIOPSY, ABDOMINAL WALL (N/A)  BLOCK, TRANSVERSUS ABDOMINIS PLANE (N/A) 2 Days Post-Op    Plan:     During this hospitalization, patient to be seen 3 x/week to address the identified rehab impairments via gait training, therapeutic activities, therapeutic exercises and progress toward the following goals:    Plan of Care Expires:  07/08/23    Subjective     Chief Complaint: "It's hard to move with all of this connected to me."  Patient/Family Comments/goals: Get better and return home.   Pain/Comfort:  Pain Rating 1: 0/10    Patients cultural, spiritual, Cheondoism conflicts given the current situation: no    Living Environment:    Patient lives with his spouse in a single level town home with no steps to enter.   Prior to admission, patients level of function was ambulating with a RW, Mod I with ADLs, driving.  Equipment used at home: walker, rolling, bath bench, rollator, grab bar.  DME owned (not currently used):  rollator .  Upon " discharge, patient will have assistance from spouse.    Objective:     Communicated with REBECA Huizar prior to session.  Patient found supine with NANCY drain, NG tube, telemetry, peripheral IV, PCA, londono catheter  upon PT entry to room.    General Precautions: Standard, fall  Orthopedic Precautions:N/A   Braces: N/A  Respiratory Status: Nasal cannula, flow 2 L/min    Exams:  Cognitive Exam:  Patient is oriented to Person, Place, Time, and Situation  RLE ROM: WFL  RLE Strength: WFL  LLE ROM: WFL  LLE Strength: WFL    Functional Mobility:  Bed Mobility:     Supine to Sit: minimum assistance  Transfers:     Sit to Stand:  minimum assistance with rolling walker  Bed to Chair: minimum assistance with  rolling walker  using  Step Transfer  Gait: 15 feet x2, Min A with RW      AM-PAC 6 CLICK MOBILITY  Total Score:16       Treatment & Education:    Patient found supine in bed upon PT entrance into room. PT provided education on role of PT and POC. Patient agreeable to therapy session.    Patient completed:     Sup>sit: Min A with use of bed rail and assistance of guiding BLE toward EOB    Scooting: SBA to place feet on the floor    STS: Min A with RW and verbal cues for hand placement for safety with transfer    Gait: 15 feet x2, Min A with RW. Patient ambulated with decreased stride length and ayan but no LOB.     Chair tx: Min A with RW. Verbal cues to reach back for arm rest of chair with lowering body into seated position.     BLE therex: Education on how to complete BLE therex while seated in bedside chair- Marching, ankle pumps, LAQ        Patient left up in chair with all lines intact, call button in reach, and spouse present.    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    #4 PROSTATE BIOPSY, LEFT APEX: ADENOCARCINOMA, FARHAD GRADE 3 + 3 = 6, IN 1 of 2 CORES 9/8/2014    Anemia     Arthritis     Atrial fibrillation 10/19/2017    Back pain     Congestive heart failure 03/05/2018    Coronary artery disease     DM (diabetes mellitus) 2018     am 06/23/2020    DM (diabetes mellitus) 2016     am 08/17/2021    Hyperlipemia     Hypertension     Myocardial infarction     NASREEN (obstructive sleep apnea) 06/05/2018    Prostate cancer 2015    Tobacco dependence     Type 2 diabetes mellitus        Past Surgical History:   Procedure Laterality Date    BIOPSY OF ABDOMINAL WALL N/A 6/22/2023    Procedure: BIOPSY, ABDOMINAL WALL;  Surgeon: Victor Hugo Moody MD;  Location: [facility] OR;  Service: General;  Laterality: N/A;    COLONOSCOPY N/A 9/22/2020    Procedure: COLONOSCOPY;  Surgeon: Javier Farmer MD;  Location: [facility] ENDO;  Service: Endoscopy;  Laterality: N/A;    CORONARY ARTERY BYPASS GRAFT  1987    DIAGNOSTIC LAPAROSCOPY N/A 6/22/2023    Procedure: LAPAROSCOPY, DIAGNOSTIC;  Surgeon: Victor Hugo Moody MD;  Location: [facility] OR;  Service: General;  Laterality: N/A;  dx lap converted to ex lap  converted to ex lap at 2028      EPIDURAL STEROID INJECTION N/A 11/22/2019    Procedure: Lumbar L5/S1 IL XUAN;  Surgeon: Tacho Trivedi MD;  Location: [facility] PAIN MGT;  Service: Pain Management;  Laterality: N/A;    EPIDURAL STEROID INJECTION N/A 1/6/2020    Procedure: Lumbar L5/S1 IL XUAN;  Surgeon: Tacho Trivedi MD;  Location: [facility] PAIN MGT;  Service: Pain Management;  Laterality: N/A;    EPIDURAL STEROID INJECTION N/A 2/10/2020    Procedure: Caudal XUAN;  Surgeon: Tacho Trivedi MD;  Location: [facility] PAIN MGT;  Service: Pain Management;  Laterality: N/A;    ESOPHAGOGASTRODUODENOSCOPY N/A 9/22/2020    Procedure: EGD  (ESOPHAGOGASTRODUODENOSCOPY);  Surgeon: Javier Farmer MD;  Location: [facility] ENDO;  Service: Endoscopy;  Laterality: N/A;    INJECTION OF ANESTHETIC AGENT AROUND MEDIAL BRANCH NERVES INNERVATING LUMBAR FACET JOINT Bilateral 10/12/2020    Procedure: Bilateral L3-5 MBB;  Surgeon: Tacho Trivedi MD;  Location: [facility] PAIN MGT;  Service: Pain Management;  Laterality: Bilateral;    INJECTION OF ANESTHETIC AGENT AROUND MEDIAL BRANCH NERVES INNERVATING LUMBAR FACET JOINT Bilateral 12/21/2020    Procedure: Bilateral L3-5 MBB with steroid;  Surgeon: Tacho Trivedi MD;  Location: [facility] PAIN MGT;  Service: Pain Management;  Laterality: Bilateral;    INJECTION OF ANESTHETIC AGENT INTO TISSUE PLANE DEFINED BY TRANSVERSUS ABDOMINIS MUSCLE N/A 6/22/2023    Procedure: BLOCK, TRANSVERSUS ABDOMINIS PLANE;  Surgeon: Victor Hugo Moody MD;  Location: [facility] OR;  Service: General;  Laterality: N/A;    INSERTION OF SPINAL NEUROSTIMULATOR N/A 3/23/2023    Procedure: INSERTION, NEUROSTIMULATOR, SPINAL;  Surgeon: Philipp Demarco MD;  Location: [facility] OR;  Service: Pain Management;  Laterality: N/A;    INTRALUMINAL GASTROINTESTINAL TRACT IMAGING VIA CAPSULE N/A 12/29/2021    Procedure: IMAGING PROCEDURE, GI TRACT, INTRALUMINAL, VIA CAPSULE;  Surgeon: Tahir Geronimo RN;  Location: [facility] ENDO;  Service: Endoscopy;  Laterality: N/A;    LAPAROTOMY, EXPLORATORY N/A 6/22/2023    Procedure: LAPAROTOMY, EXPLORATORY;  Surgeon: Victor Hugo Moody MD;  Location: [facility] OR;  Service: General;  Laterality: N/A;    PROSTATE SURGERY      prostate radiation    RADIOFREQUENCY THERMOCOAGULATION Left 6/4/2021    Procedure: Left L3-5 Lumbar RFA;  Surgeon: Tacho Trivedi MD;  Location: [facility] PAIN MGT;  Service: Pain Management;  Laterality: Left;    RADIOFREQUENCY THERMOCOAGULATION Right 6/18/2021    Procedure: Right L3-5 Lumbar RFA;  Surgeon: Tacho Trivedi MD;  Location: [facility] PAIN MGT;  Service: Pain Management;  Laterality: Right;    REPLACEMENT OF  PACEMAKER GENERATOR Left 6/16/2022    Procedure: REPLACEMENT, PULSE GENERATOR, CARDIAC PACEMAKER/CRT-P device;  Surgeon: Darrel Carrero MD;  Location: [facility] CATH LAB;  Service: Cardiology;  Laterality: Left;  NOT MRI safe   Pacer and leads implanted 9/30/2017, Dr. Souleymane CARROLLT Consulta CRT-P C4TR01, [id number]   A lead: Mdt 5076 CapSureFix® Novus, [id number]   RV lead: Mdt 5076 CapSureFix® Novus, [id number]   LV lead: Jovan 4196 At    RIGHT HEMICOLECTOMY N/A 6/22/2023    Procedure: HEMICOLECTOMY, RIGHT;  Surgeon: Victor Hugo Moody MD;  Location: [facility] OR;  Service: General;  Laterality: N/A;    SELECTIVE INJECTION OF ANESTHETIC AGENT AROUND LUMBAR SPINAL NERVE ROOT BY TRANSFORAMINAL APPROACH Bilateral 6/8/2022    Procedure: Bilateral L3/4 TF XUAN with RN IV sedation;  Surgeon: Philipp Demarco MD;  Location: [facility] PAIN MGT;  Service: Pain Management;  Laterality: Bilateral;    SPINE SURGERY      fusion    TONSILLECTOMY      TRIAL OF SPINAL CORD NERVE STIMULATOR N/A 1/25/2023    Procedure: Trial, Neurostimulator, Spinal Cord with RN IV sedation;  Surgeon: Phiilpp Demarco MD;  Location: [facility] PAIN MGT;  Service: Pain Management;  Laterality: N/A;       Time Tracking:     PT Received On: 06/24/23  PT Start Time: 0845     PT Stop Time: 0908  PT Total Time (min): 23 min     Billable Minutes: Evaluation 11 and Gait Training 12      06/24/2023

## 2023-06-24 NOTE — PROGRESS NOTES
[facility] Telemetry ([facility])  [facility] Medicine  Progress Note    Patient Name: Jake Ortiz  MRN: [id number]  Patient Class: IP- Inpatient   Admission Date: 6/22/2023  Length of Stay: 1 days  Attending Physician: Priyanka Mazariegos MD  Primary Care Provider: Byron Stevens MD        Subjective:     Principal Problem:Perforated appendicitis        HPI:  Mr. Ortiz is a 84 yo male with hx of prostate cancer (not on active treatment), Afib on Eliquis, CHF, and CAD s/p CABG x 3 presented with abdominal pain that has gotten progressively worse over the last 6 weeks, but it was significantly worse this morning and therefore the wife brought him into the hospital.  On CT abdomen patient was found to have a perforated appendix and general surgery was consulted.  Dr. Moody recommended IR consulted for IR drain placement. Dr. Landin recommended CT with PO/IV contrast but due to renal function IV contrast cannot be given.  Patient is NPO and continues to have abdominal tenderness and guarding. Hospital medicine will admit to inpatient for perforated appendix, follow up on IR and GS recommendaitons and IV antibiotics.          Overview/Hospital Course:  Gen Surg evaluated and due to peritonitis, opted to take patient to diagnostic laparoscopy on 06/22 which was then converted to an ex-lap. Pt was found to have walled off perforation in R abdomen near appendix/cecum with intraabdominal purulence, underwent R hemicolectomy.       Interval History: NAEON. Pt seen sitting up in chair after working with PT. He reports some incisional pain, controlled with PCA. Has not used PCA much. Reports nausea but no vomiting. No BM or flatus yet.     Review of Systems  Objective:     Vital Signs (Most Recent):  Temp: 97.8 °F (36.6 °C) (06/24/23 1251)  Pulse: 63 (06/24/23 1251)  Resp: 17 (06/24/23 1251)  BP: (!) 160/72 (06/24/23 1251)  SpO2: (!) 92 % (06/24/23 1251) Vital Signs (24h Range):  Temp:  [97.8 °F (36.6 °C)-98.5 °F (36.9 °C)] 97.8 °F  (36.6 °C)  Pulse:  [59-82] 63  Resp:  [16-30] 17  SpO2:  [92 %-99 %] 92 %  BP: (120-160)/(56-72) 160/72     Weight: 91.6 kg (201 lb 15.1 oz)  Body mass index is 28.98 kg/m².    Intake/Output Summary (Last 24 hours) at 6/24/2023 1345  Last data filed at 6/24/2023 1100  Gross per 24 hour   Intake 16 ml   Output 330 ml   Net -314 ml         Physical Exam  Vitals and nursing note reviewed.   Constitutional:       General: He is not in acute distress.     Appearance: He is ill-appearing.   HENT:      Head:      Comments: NGT  Cardiovascular:      Rate and Rhythm: Normal rate and regular rhythm.      Heart sounds: No murmur heard.    No friction rub. No gallop.   Pulmonary:      Breath sounds: No wheezing, rhonchi or rales.      Comments: Diminished breath sounds bilaterally, shallow effort, no increased work of breathing, on 2L NC O2   Abdominal:      Palpations: Abdomen is soft.      Comments: Abd binder in place, bowel sounds hypoactive, NANCY drain with serousanguinous output   Genitourinary:     Comments: Serra in place   Musculoskeletal:      Right lower leg: No edema.      Left lower leg: No edema.   Neurological:      Mental Status: He is alert and oriented to person, place, and time. Mental status is at baseline.           Significant Labs: All pertinent labs within the past 24 hours have been reviewed.    Significant Imaging: I have reviewed all pertinent imaging results/findings within the past 24 hours.      Assessment/Plan:      * Perforated appendicitis  - CT abdomen with PO contrast: Unchanged air fluid collection adjacent to the cecum.  No oral contrast is seen within this collection. Increasing airspace opacities within the lower lobes bilaterally suggesting atelectasis or pneumonia.   - Gen surg consulted- s/p ex-lap with R hemicolectomy on 06/22/23  - Discussed with surgery Dr. Church- continue NPO, NGT for now pending return of bowel function   - Continue IV Zosyn   - stop morphine PCA, transition to IV  Morphine q4h PRN   - continue gentle IVF while NPO       Chronic combined systolic and diastolic congestive heart failure  Patient is identified as having Combined Systolic and Diastolic heart failure that is Chronic. CHF is currently controlled. Latest ECHO performed and demonstrates- Results for orders placed during the hospital encounter of 05/10/23    Echo    Interpretation Summary  · Eccentric hypertrophy and low normal systolic function.  · Moderate left atrial enlargement.  · The estimated ejection fraction is 50%.  · Indeterminate left ventricular diastolic function.  · There is abnormal septal wall motion consistent with left bundle branch block.  · Normal right ventricular size with normal right ventricular systolic function.  · Mild-to-moderate mitral regurgitation.  · Mild to moderate tricuspid regurgitation.  - hold home diuretic, ARB while NPO   - strict Is and Os   - Judicious IV fluids while NPO     Atrial fibrillation with chronic anticoagulation with Eliquis  Patient with Permanent atrial fibrillation which is controlled currently with no medication. Patient is currently in sinus rhythm.[id number] Score: 4.. Anticoagulation indicated. Anticoagulation done with Eliquis.  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  - monitor CBC   - transfuse for Hgb < 8 or if pt symptomatic     Stage 4 chronic kidney disease  Cr 1.4-1.7 recently   Appears to be close to baseline   Monitor BMP; renally dose meds   Avoid nephrotoxic agents     S/P CABG x 3  - Patient on Eliquis - last dose 6/21 PM  - Hold Eliquis until cleared from a surgical standpoint to do so   - Cardiology consulted for preop eval       Hyperlipemia  - resume statin on discharge      Hypertension  - hold PO meds while NGT in place   - monitor BP   - IV hydralazine as needed         VTE Risk Mitigation (From  admission, onward)         Ordered     IP VTE HIGH RISK PATIENT  Once         06/22/23 1405     Place sequential compression device  Until discontinued         06/22/23 1405                Discharge Planning   LITTLE:      Code Status: Full Code   Is the patient medically ready for discharge?: No    Reason for patient still in hospital (select all that apply): Patient trending condition, Treatment and Consult recommendations  Discharge Plan A: Home Health, Home with family                  Priyanka Mazariegos MD  Department of Hospital Medicine   [facility] - Telemetry ([facility])

## 2023-06-24 NOTE — SUBJECTIVE & OBJECTIVE
Interval History:  Pain well controlled. NANCY with SS drainage. No n/v. NG tube with bilious output. No BM or flatus at this time.    Medications:  Continuous Infusions:   sodium chloride 0.9% 50 mL/hr at 06/23/23 1111     Scheduled Meds:   fluticasone propionate  2 spray Each Nostril Daily    miconazole NITRATE 2 %   Topical (Top) BID    mupirocin   Nasal BID    pantoprazole  40 mg Intravenous Daily    piperacillin-tazobactam (Zosyn) IV (PEDS and ADULTS) (extended infusion is not appropriate)  4.5 g Intravenous Q8H     PRN Meds:acetaminophen, hydrALAZINE, morphine, naloxone, prochlorperazine, sodium chloride 0.9%     Review of patient's allergies indicates:  No Known Allergies  Objective:     Vital Signs (Most Recent):  Temp: 98.1 °F (36.7 °C) (06/24/23 0721)  Pulse: 64 (06/24/23 0829)  Resp: (!) 22 (06/24/23 0829)  BP: (!) 140/63 (06/24/23 0721)  SpO2: 99 % (06/24/23 0829) Vital Signs (24h Range):  Temp:  [97.8 °F (36.6 °C)-98.5 °F (36.9 °C)] 98.1 °F (36.7 °C)  Pulse:  [58-82] 64  Resp:  [16-30] 22  SpO2:  [95 %-99 %] 99 %  BP: (118-144)/(56-65) 140/63     Weight: 91.6 kg (201 lb 15.1 oz)  Body mass index is 28.98 kg/m².    Intake/Output - Last 3 Shifts         06/22 0700  06/23 0659 06/23 0700 06/24 0659 06/24 0700  06/25 0659    I.V. (mL/kg)  26 (0.3)     IV Piggyback 1600      Total Intake(mL/kg) 1600 (17.5) 26 (0.3)     Urine (mL/kg/hr) 700 300 (0.1)     Drains  170     Blood 150      Total Output 850 470     Net +750 -444                     Physical Exam  Vitals and nursing note reviewed.   Constitutional:       General: He is not in acute distress.     Appearance: Normal appearance. He is ill-appearing. He is not toxic-appearing.   HENT:      Head: Normocephalic.      Right Ear: External ear normal.      Left Ear: External ear normal.      Nose: Nose normal.   Eyes:      Conjunctiva/sclera: Conjunctivae normal.   Cardiovascular:      Rate and Rhythm: Normal rate.   Pulmonary:      Effort: Pulmonary effort is  normal. No respiratory distress.   Abdominal:      Comments: Soft, +mild distention, appropriate TTP, NANCY with SS drainage, midline incision dionicio removed, staples intact clean and dry   Skin:     General: Skin is warm and dry.   Neurological:      Mental Status: He is alert and oriented to person, place, and time.   Psychiatric:         Mood and Affect: Mood normal.         Behavior: Behavior normal.        Significant Labs:  I have reviewed all pertinent lab results within the past 24 hours.  CBC:   Recent Labs   Lab 06/24/23  0438   WBC 14.14*   RBC 3.28*   HGB 9.4*   HCT 30.4*      MCV 93   MCH 28.7   MCHC 30.9*       BMP:   Recent Labs   Lab 06/24/23  0438   *      K 3.7      CO2 23   BUN 45*   CREATININE 1.9*   CALCIUM 8.1*         Significant Diagnostics:  I have reviewed all pertinent imaging results/findings within the past 24 hours.

## 2023-06-24 NOTE — PLAN OF CARE
Hi, Dr. Faisal Porras! Thank you for referring Ms. Felton Recinos for rheumatologic evaluation. Please see the discussion portion of my note and let me know if you have any questions.      Amos Bridges, DO  [facility]  12/30/2021 OT EVAL COMPLETE.  PATIENT WOULD BENEFIT FROM CONT SKILLED OT INTERVENTION.  OT RECOMMENDS OT HH AT D/C.

## 2023-06-24 NOTE — ASSESSMENT & PLAN NOTE
Patient with Permanent atrial fibrillation which is controlled currently with no medication. Patient is currently in sinus rhythm.[id number] Score: 4.. Anticoagulation indicated. Anticoagulation done with Eliquis.  - Eliquis on hold given surgical intervention   - resume anticoag when cleared from a surgical standpoint to do so

## 2023-06-25 NOTE — PROGRESS NOTES
[facility]  General Surgery  Progress Note    Subjective:     History of Present Illness:  83 y.o. male with PMHx of CHF, CAD, DM, HTN who presented to the ED for generalized abdominal pain.  Describes the pain as starting around 6:00 a.m. this morning after having a bowel movement.  The pain was sharp and constant.  Presented to the ER where CT scan was shown concerning for extraluminal air fluid collection near where the appendix should be near the cecum concerning for possible perforation/abscess.  Surgery consulted for evaluation.  Per the patient and his wife, the patient has had intermittent abdominal pain and diarrhea over the past 6 weeks but the pain this morning is different than anything he is ever experienced.  Denies previous abdominal surgical history.  Is on Eliquis per Cardiology.      Post-Op Info:  Procedure(s) (LRB):  LAPAROSCOPY, DIAGNOSTIC (N/A)  LAPAROTOMY, EXPLORATORY (N/A)  HEMICOLECTOMY, RIGHT (N/A)  BIOPSY, ABDOMINAL WALL (N/A)  BLOCK, TRANSVERSUS ABDOMINIS PLANE (N/A)   3 Days Post-Op     Interval History:  NG tube fell out overnight, no nausea or emesis, no flatus or bowel movement, ambulating with assistance, pain controlled    Medications:  Continuous Infusions:   sodium chloride 0.9% 50 mL/hr at 06/24/23 1619     Scheduled Meds:   fluticasone propionate  2 spray Each Nostril Daily    heparin (porcine)  5,000 Units Subcutaneous Q8H    miconazole NITRATE 2 %   Topical (Top) BID    mupirocin   Nasal BID    pantoprazole  40 mg Intravenous Daily    piperacillin-tazobactam (Zosyn) IV (PEDS and ADULTS) (extended infusion is not appropriate)  4.5 g Intravenous Q8H    potassium chloride  10 mEq Intravenous Q1H     PRN Meds:acetaminophen, hydrALAZINE, melatonin, morphine, naloxone, prochlorperazine, sodium chloride 0.9%     Review of patient's allergies indicates:  No Known Allergies  Objective:     Vital Signs (Most Recent):  Temp: 97.9 °F (36.6 °C) (06/25/23  0714)  Pulse: 65 (06/25/23 0915)  Resp: 20 (06/25/23 0855)  BP: (!) 157/70 (06/25/23 0714)  SpO2: (!) 94 % (06/25/23 0855) Vital Signs (24h Range):  Temp:  [97.9 °F (36.6 °C)-98.4 °F (36.9 °C)] 97.9 °F (36.6 °C)  Pulse:  [61-70] 65  Resp:  [17-22] 20  SpO2:  [93 %-96 %] 94 %  BP: (116-184)/(56-80) 157/70     Weight: 91.6 kg (201 lb 15.1 oz)  Body mass index is 28.98 kg/m².    Intake/Output - Last 3 Shifts         06/23 0700  06/24 0659 06/24 0700 06/25 0659 06/25 0700  06/26 0659    I.V. (mL/kg) 26 (0.3) 0 (0)     IV Piggyback       Total Intake(mL/kg) 26 (0.3) 0 (0)     Urine (mL/kg/hr) 300 (0.1) 550 (0.3)     Drains 170 40 45    Blood       Total Output 470 590 45    Net -444 590 -45                   Physical Exam  Vitals and nursing note reviewed.   Constitutional:       General: He is not in acute distress.     Appearance: Normal appearance. He is ill-appearing. He is not toxic-appearing.   HENT:      Head: Normocephalic.      Right Ear: External ear normal.      Left Ear: External ear normal.      Nose: Nose normal.   Eyes:      Conjunctiva/sclera: Conjunctivae normal.   Cardiovascular:      Rate and Rhythm: Normal rate.   Pulmonary:      Effort: Pulmonary effort is normal. No respiratory distress.   Abdominal:      Comments: Soft, +mild distention, appropriate TTP, NANCY with SS drainage, midline incision  staples intact clean and dry   Skin:     General: Skin is warm and dry.   Neurological:      Mental Status: He is alert and oriented to person, place, and time.   Psychiatric:         Mood and Affect: Mood normal.         Behavior: Behavior normal.        Significant Labs:  I have reviewed all pertinent lab results within the past 24 hours.  CBC:   Recent Labs   Lab 06/25/23  0428   WBC 11.77   RBC 3.18*   HGB 9.3*   HCT 29.2*      MCV 92   MCH 29.2   MCHC 31.8*       BMP:   Recent Labs   Lab 06/25/23  0428   *      K 3.3*      CO2 22*   BUN 42*   CREATININE 1.6*   CALCIUM 8.3*          Significant Diagnostics:  I have reviewed all pertinent imaging results/findings within the past 24 hours.    Assessment/Plan:     * Perforated appendicitis  82yo F with perforation/abscess/plegmon in the RLQ concerning for perforation of appendix vs cecum vs small bowel s/p open [facility] 06/23/23    - continue IV abx  - NG tube fell out overnight no nausea or emesis however still distended and no bowel function if nausea vomiting, abdominal distention or pain worsen will need replacement of NG tube  - strict aspiration precautions come elevate head of bed   - NPO  - awaiting bowel function return  - IV medications for pain  - incentive spirometry  - encouraged OOB, ambulation    Anemia associated with stage 4 chronic renal failure  Care per primary team    Chronic combined systolic and diastolic congestive heart failure  Care per primary team      Atrial fibrillation with chronic anticoagulation with Eliquis  Care per primary team    S/P CABG x 3  Care per primary team    Hyperlipemia  Care per primary team    Hypertension  Care per primary team        Jj Church MD  General Surgery  [facility] - Telemetry ([facility])

## 2023-06-25 NOTE — ASSESSMENT & PLAN NOTE
- CT abdomen with PO contrast: Unchanged air fluid collection adjacent to the cecum.  No oral contrast is seen within this collection. Increasing airspace opacities within the lower lobes bilaterally suggesting atelectasis or pneumonia.   - Gen surg consulted- s/p ex-lap with R hemicolectomy on 06/22/23  - continue NPO, NGT for now pending return of bowel function per surgery   - Continue IV Zosyn   - Continue IV Morphine q4h PRN for pain control   - continue gentle IVF while NPO

## 2023-06-25 NOTE — ASSESSMENT & PLAN NOTE
Patient with Permanent atrial fibrillation which is controlled currently with no medication. Patient is currently in sinus rhythm.[id number] Score: 4.. Anticoagulation indicated. Anticoagulation done with Eliquis.  - Eliquis on hold given surgical intervention   - resume anticoag when cleared from a surgical standpoint to do so

## 2023-06-25 NOTE — ASSESSMENT & PLAN NOTE
84yo F with perforation/abscess/plegmon in the RLQ concerning for perforation of appendix vs cecum vs small bowel s/p open [facility] 06/23/23    - continue IV abx  - NG tube fell out overnight no nausea or emesis however still distended and no bowel function if nausea vomiting, abdominal distention or pain worsen will need replacement of NG tube  - strict aspiration precautions come elevate head of bed   - NPO  - awaiting bowel function return  - IV medications for pain  - incentive spirometry  - encouraged OOB, ambulation

## 2023-06-25 NOTE — ASSESSMENT & PLAN NOTE
Patient is identified as having Combined Systolic and Diastolic heart failure that is Chronic. CHF is currently controlled and uncontrolled due to Continued edema of extremities. Latest ECHO performed and demonstrates- Results for orders placed during the hospital encounter of 05/10/23    Echo    Interpretation Summary  · Eccentric hypertrophy and low normal systolic function.  · Moderate left atrial enlargement.  · The estimated ejection fraction is 50%.  · Indeterminate left ventricular diastolic function.  · There is abnormal septal wall motion consistent with left bundle branch block.  · Normal right ventricular size with normal right ventricular systolic function.  · Mild-to-moderate mitral regurgitation.  · Mild to moderate tricuspid regurgitation.  - hold home diuretic, ARB while NPO    -  Give IV lasix 20 mg x 1 for BLE edema   - strict Is and Os   - Judicious IV fluids while NPO

## 2023-06-25 NOTE — PT/OT/SLP PROGRESS
"Physical Therapy  Treatment    Jake Ortiz   MRN: [id number]   Admitting Diagnosis: Perforated appendicitis    PT Received On: 06/25/23  PT Start Time: 1048     PT Stop Time: 1115    PT Total Time (min): 27 min       Billable Minutes:  Gait Training 15 and Therapeutic Exercise 12    Treatment Type: Treatment  PT/PTA: PTA     Number of PTA visits since last PT visit: 1       General Precautions: Standard, fall  Orthopedic Precautions: N/A  Braces: N/A  Respiratory Status: Room air    Spiritual, Cultural Beliefs, Sabianist Practices, Values that Affect Care: no    Subjective:  Communicated with nursing staff, Gaye and completed Epic chart review prior to session.  Patient agrees to PT.    Pain/Comfort  Pain Rating 1: 0/10    Objective:   Patient found with: telemetry, peripheral IV, NANCY drain upon PT arrival.    Functional Mobility:  Bed Mobility:    Patient presents sitting in bedside chair.    Transfers: with RW   Sit to Stand: minimal assistance  SPT to bedside chair: minimal assistance    Gait:    100 feet with RW, minimal assistance provided.  Demonstrates decreased step length, slow paced      Treatment and Education:  Performed seated therapeutic exercises (AP, HIP FLEX/ABD, LAQ) to improve range of motion and strengthening of BLEs to improve range of motion and strengthening of BLEs.   Pt educated on the following: Pt verbally agreed in understanding.   Pursed-lip breathing techniques to improve O2 quality.  Continue therapuetic exercises sit in chair or EOB throughout the day to increase activity tolerance and decrease risk for pneumonia and blood clots.  Sit in chair or EOB for all 3 meals and when guests visit.    "Call, don't fall" procedure of pressing red button on call bell for all transfers.      AM-PAC 6 CLICK MOBILITY  How much help from another person does this patient currently need?   1 = Unable, Total/Dependent Assistance  2 = A lot, Maximum/Moderate Assistance  3 = A little, Minimum/Contact " Guard/Supervision  4 = None, Modified [place]/Independent    Turning over in bed (including adjusting bedclothes, sheets and blankets)?: 3  Sitting down on and standing up from a chair with arms (e.g., wheelchair, bedside commode, etc.): 3  Moving from lying on back to sitting on the side of the bed?: 3  Moving to and from a bed to a chair (including a wheelchair)?: 3  Need to walk in hospital room?: 3  Climbing 3-5 steps with a railing?: 1 (NT)  Basic Mobility Total Score: 16    AM-PAC Raw Score CMS G-Code Modifier Level of Impairment Assistance   6 % Total / Unable   7 - 9 CM 80 - 100% Maximal Assist   10 - 14 CL 60 - 80% Moderate Assist   15 - 19 CK 40 - 60% Moderate Assist   20 - 22 CJ 20 - 40% Minimal Assist   23 CI 1-20% SBA / CGA   24 CH 0% Independent/ Mod I     Patient left up in chair with all lines intact, call button in reach, chair alarm on, nursing  notified, and spouse present.  All needs met.    Assessment:  Jake Ortiz is a 83 y.o. male with a medical diagnosis of Perforated appendicitis and presents with overall decline in functional mobility. Patient tolerated therapy session fair this date as he was able to ambulate for a further distance and performed therapeutic intervention without increase in pain. Demonstrates good rehab potential and would benefit from continued therapy sessions to address the impairments listed below.    Rehab identified problem list/impairments: weakness, impaired endurance, impaired self care skills, impaired functional mobility, decreased lower extremity function, gait instability, impaired balance, decreased safety awareness, pain    Rehab potential is good.    Activity tolerance: Good    Discharge recommendations: home health PT      Barriers to discharge:      Equipment recommendations: none     GOALS:   Multidisciplinary Problems       Physical Therapy Goals          Problem: Physical Therapy    Goal Priority Disciplines Outcome Goal Variances  Interventions   Physical Therapy Goal     PT, PT/OT      Description: LTG to be met by 07/08/23:    Bed mob: SPV  Transfers: SPV with RW  Gait: SPV with RW x100 feet                       PLAN:    Patient to be seen 3 x/week to address the above listed problems via gait training, therapeutic activities, therapeutic exercises  Plan of Care expires: 07/08/23  Plan of Care reviewed with: patient, spouse         06/25/2023

## 2023-06-25 NOTE — NURSING
Received sitting up in chair awake and alert talking with wife. Assessment per flowsheet, Plan of care discussed, pt verbalized understanding. Pt and wife requested remove [employer] camera stating it's in his way, removed and order dc'd per charge nurse. Bed low, call light within reach. Will continue to monitor.

## 2023-06-25 NOTE — SUBJECTIVE & OBJECTIVE
Interval History: NAEON. Pt seen with wife at bedside. He reports more abdominal bloating today but no increased pain, reports 4/10 pain to abd incision sites, is not sure if morphine is helping. No nausea, vomiting. No BM or flatus yet. Denies CP, SOB but reports increased lower extremity swelling.     Review of Systems  Objective:     Vital Signs (Most Recent):  Temp: 97.9 °F (36.6 °C) (06/25/23 0714)  Pulse: 67 (06/25/23 0855)  Resp: 20 (06/25/23 0855)  BP: (!) 157/70 (06/25/23 0714)  SpO2: (!) 94 % (06/25/23 0855) Vital Signs (24h Range):  Temp:  [97.8 °F (36.6 °C)-98.4 °F (36.9 °C)] 97.9 °F (36.6 °C)  Pulse:  [59-70] 67  Resp:  [17-22] 20  SpO2:  [92 %-96 %] 94 %  BP: (116-184)/(56-80) 157/70     Weight: 91.6 kg (201 lb 15.1 oz)  Body mass index is 28.98 kg/m².    Intake/Output Summary (Last 24 hours) at 6/25/2023 1021  Last data filed at 6/25/2023 0714  Gross per 24 hour   Intake 0 ml   Output 635 ml   Net -635 ml         Physical Exam  Vitals and nursing note reviewed.   Constitutional:       General: He is not in acute distress.     Appearance: Normal appearance. He is ill-appearing.   Cardiovascular:      Rate and Rhythm: Normal rate and regular rhythm.      Heart sounds: No murmur heard.    No friction rub. No gallop.   Pulmonary:      Effort: Pulmonary effort is normal.      Breath sounds: No wheezing, rhonchi or rales.      Comments: Diminished breath sounds bilaterally, no rales noted, no increased work of breathing on room air   Abdominal:      Comments: Soft, mild TTP, midline dressing and abd binder in place, bowel sounds hypoactive    Musculoskeletal:      Right lower leg: Edema present.      Left lower leg: Edema present.      Comments: 1+ BLE edema    Neurological:      General: No focal deficit present.      Mental Status: He is alert and oriented to person, place, and time. Mental status is at baseline.           Significant Labs: All pertinent labs within the past 24 hours have been  reviewed.    Significant Imaging: I have reviewed all pertinent imaging results/findings within the past 24 hours.

## 2023-06-25 NOTE — SUBJECTIVE & OBJECTIVE
Interval History:  NG tube fell out overnight, no nausea or emesis, no flatus or bowel movement, ambulating with assistance, pain controlled    Medications:  Continuous Infusions:   sodium chloride 0.9% 50 mL/hr at 06/24/23 1619     Scheduled Meds:   fluticasone propionate  2 spray Each Nostril Daily    heparin (porcine)  5,000 Units Subcutaneous Q8H    miconazole NITRATE 2 %   Topical (Top) BID    mupirocin   Nasal BID    pantoprazole  40 mg Intravenous Daily    piperacillin-tazobactam (Zosyn) IV (PEDS and ADULTS) (extended infusion is not appropriate)  4.5 g Intravenous Q8H    potassium chloride  10 mEq Intravenous Q1H     PRN Meds:acetaminophen, hydrALAZINE, melatonin, morphine, naloxone, prochlorperazine, sodium chloride 0.9%     Review of patient's allergies indicates:  No Known Allergies  Objective:     Vital Signs (Most Recent):  Temp: 97.9 °F (36.6 °C) (06/25/23 0714)  Pulse: 65 (06/25/23 0915)  Resp: 20 (06/25/23 0855)  BP: (!) 157/70 (06/25/23 0714)  SpO2: (!) 94 % (06/25/23 0855) Vital Signs (24h Range):  Temp:  [97.9 °F (36.6 °C)-98.4 °F (36.9 °C)] 97.9 °F (36.6 °C)  Pulse:  [61-70] 65  Resp:  [17-22] 20  SpO2:  [93 %-96 %] 94 %  BP: (116-184)/(56-80) 157/70     Weight: 91.6 kg (201 lb 15.1 oz)  Body mass index is 28.98 kg/m².    Intake/Output - Last 3 Shifts         06/23 0700 06/24 0659 06/24 0700 06/25 0659 06/25 0700 06/26 0659    I.V. (mL/kg) 26 (0.3) 0 (0)     IV Piggyback       Total Intake(mL/kg) 26 (0.3) 0 (0)     Urine (mL/kg/hr) 300 (0.1) 550 (0.3)     Drains 170 40 45    Blood       Total Output 470 590 45    Net -444 -590 -45                    Physical Exam  Vitals and nursing note reviewed.   Constitutional:       General: He is not in acute distress.     Appearance: Normal appearance. He is ill-appearing. He is not toxic-appearing.   HENT:      Head: Normocephalic.      Right Ear: External ear normal.      Left Ear: External ear normal.      Nose: Nose normal.   Eyes:       Conjunctiva/sclera: Conjunctivae normal.   Cardiovascular:      Rate and Rhythm: Normal rate.   Pulmonary:      Effort: Pulmonary effort is normal. No respiratory distress.   Abdominal:      Comments: Soft, +mild distention, appropriate TTP, NANCY with SS drainage, midline incision  staples intact clean and dry   Skin:     General: Skin is warm and dry.   Neurological:      Mental Status: He is alert and oriented to person, place, and time.   Psychiatric:         Mood and Affect: Mood normal.         Behavior: Behavior normal.        Significant Labs:  I have reviewed all pertinent lab results within the past 24 hours.  CBC:   Recent Labs   Lab 06/25/23 0428   WBC 11.77   RBC 3.18*   HGB 9.3*   HCT 29.2*      MCV 92   MCH 29.2   MCHC 31.8*       BMP:   Recent Labs   Lab 06/25/23 0428   *      K 3.3*      CO2 22*   BUN 42*   CREATININE 1.6*   CALCIUM 8.3*         Significant Diagnostics:  I have reviewed all pertinent imaging results/findings within the past 24 hours.

## 2023-06-25 NOTE — PROGRESS NOTES
[facility] ([facility])  [facility] Medicine  Progress Note    Patient Name: Jake Ortiz  MRN: [id number]  Patient Class: IP- Inpatient   Admission Date: 6/22/2023  Length of Stay: 2 days  Attending Physician: Priyanka Mazariegos MD  Primary Care Provider: Byron Stevens MD        Subjective:     Principal Problem:Perforated appendicitis        HPI:  Mr. Ortiz is a 84 yo male with hx of prostate cancer (not on active treatment), Afib on Eliquis, CHF, and CAD s/p CABG x 3 presented with abdominal pain that has gotten progressively worse over the last 6 weeks, but it was significantly worse this morning and therefore the wife brought him into the hospital.  On CT abdomen patient was found to have a perforated appendix and general surgery was consulted.  Dr. Moody recommended IR consulted for IR drain placement. Dr. Landin recommended CT with PO/IV contrast but due to renal function IV contrast cannot be given.  Patient is NPO and continues to have abdominal tenderness and guarding. Hospital medicine will admit to inpatient for perforated appendix, follow up on IR and GS recommendaitons and IV antibiotics.          Overview/Hospital Course:  Gen Surg evaluated and due to peritonitis, opted to take patient to diagnostic laparoscopy on 06/22 which was then converted to an ex-lap. Pt was found to have walled off perforation in R abdomen near appendix/cecum with intraabdominal purulence, underwent R hemicolectomy. Postop, NGT remains in place, awaiting return of bowel function.       Interval History: NAEON. Pt seen with wife at bedside. He reports more abdominal bloating today but no increased pain, reports 4/10 pain to abd incision sites, is not sure if morphine is helping. No nausea, vomiting. No BM or flatus yet. Denies CP, SOB but reports increased lower extremity swelling.     Review of Systems  Objective:     Vital Signs (Most Recent):  Temp: 97.9 °F (36.6 °C) (06/25/23 0714)  Pulse: 67 (06/25/23 0855)  Resp:  20 (06/25/23 0855)  BP: (!) 157/70 (06/25/23 0714)  SpO2: (!) 94 % (06/25/23 0855) Vital Signs (24h Range):  Temp:  [97.8 °F (36.6 °C)-98.4 °F (36.9 °C)] 97.9 °F (36.6 °C)  Pulse:  [59-70] 67  Resp:  [17-22] 20  SpO2:  [92 %-96 %] 94 %  BP: (116-184)/(56-80) 157/70     Weight: 91.6 kg (201 lb 15.1 oz)  Body mass index is 28.98 kg/m².    Intake/Output Summary (Last 24 hours) at 6/25/2023 1021  Last data filed at 6/25/2023 0714  Gross per 24 hour   Intake 0 ml   Output 635 ml   Net -635 ml         Physical Exam  Vitals and nursing note reviewed.   Constitutional:       General: He is not in acute distress.     Appearance: Normal appearance. He is ill-appearing.   Cardiovascular:      Rate and Rhythm: Normal rate and regular rhythm.      Heart sounds: No murmur heard.    No friction rub. No gallop.   HEENT: NCAT, NGT in place with brownish fluid in canister   Pulmonary:      Effort: Pulmonary effort is normal.      Breath sounds: No wheezing, rhonchi or rales.      Comments: Diminished breath sounds bilaterally, no rales noted, no increased work of breathing on room air   Abdominal:      Comments: Soft, mild TTP, midline dressing and abd binder in place, bowel sounds hypoactive    Musculoskeletal:      Right lower leg: Edema present.      Left lower leg: Edema present.      Comments: 1+ BLE edema    Neurological:      General: No focal deficit present.      Mental Status: He is alert and oriented to person, place, and time. Mental status is at baseline.           Significant Labs: All pertinent labs within the past 24 hours have been reviewed.    Significant Imaging: I have reviewed all pertinent imaging results/findings within the past 24 hours.      Assessment/Plan:      * Perforated appendicitis  - CT abdomen with PO contrast: Unchanged air fluid collection adjacent to the cecum.  No oral contrast is seen within this collection. Increasing airspace opacities within the lower lobes bilaterally suggesting atelectasis  or pneumonia.   - Gen surg consulted- s/p ex-lap with R hemicolectomy on 06/22/23  - continue NPO, NGT for now pending return of bowel function per surgery   - Continue IV Zosyn   - Continue IV Morphine q4h PRN for pain control   - continue gentle IVF while NPO       Chronic combined systolic and diastolic congestive heart failure  Patient is identified as having Combined Systolic and Diastolic heart failure that is Chronic. CHF is currently controlled and uncontrolled due to Continued edema of extremities. Latest ECHO performed and demonstrates- Results for orders placed during the hospital encounter of 05/10/23    Echo    Interpretation Summary  · Eccentric hypertrophy and low normal systolic function.  · Moderate left atrial enlargement.  · The estimated ejection fraction is 50%.  · Indeterminate left ventricular diastolic function.  · There is abnormal septal wall motion consistent with left bundle branch block.  · Normal right ventricular size with normal right ventricular systolic function.  · Mild-to-moderate mitral regurgitation.  · Mild to moderate tricuspid regurgitation.  - hold home diuretic, ARB while NPO    -  Give IV lasix 20 mg x 1 for BLE edema   - strict Is and Os   - Judicious IV fluids while NPO     Atrial fibrillation with chronic anticoagulation with Eliquis  Patient with Permanent atrial fibrillation which is controlled currently with no medication. Patient is currently in sinus rhythm.[id number] Score: 4.. Anticoagulation indicated. Anticoagulation done with Eliquis.  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  - monitor CBC   - transfuse for Hgb < 8 or if pt symptomatic     Preoperative cardiovascular examination        Stage 4 chronic kidney disease  Cr 1.4-1.7 recently   Appears to be close to baseline   Monitor BMP; renally dose meds   Avoid nephrotoxic  agents     S/P CABG x 3  - Patient on Eliquis - last dose 6/21 PM  - Hold Eliquis until cleared from a surgical standpoint to do so   - Cardiology consulted for preop eval       Hyperlipemia  - resume statin on discharge      Hypertension  - hold PO meds while NGT in place   - monitor BP   - IV hydralazine as needed         VTE Risk Mitigation (From admission, onward)           Ordered     heparin (porcine) injection 5,000 Units  Every 8 hours         06/24/23 1409     IP VTE HIGH RISK PATIENT  Once         06/22/23 1405     Place sequential compression device  Until discontinued         06/22/23 1405                    Discharge Planning   LITTLE:      Code Status: Full Code   Is the patient medically ready for discharge?: No    Reason for patient still in hospital (select all that apply): Patient trending condition, Treatment and Consult recommendations  Discharge Plan A: Home Health, Home with family                  Priyanka Mazariegos MD  Department of Hospital Medicine   [facility] - Telemetry ([facility])

## 2023-06-26 NOTE — SUBJECTIVE & OBJECTIVE
Interval History: Denies n/v. Some flatus last night, no BM. Ambulating with assistance. Pain well controlled.    Medications:  Continuous Infusions:   dextrose 5 % and 0.45 % NaCl       Scheduled Meds:   fluticasone propionate  2 spray Each Nostril Daily    heparin (porcine)  5,000 Units Subcutaneous Q8H    miconazole NITRATE 2 %   Topical (Top) BID    mupirocin   Nasal BID    pantoprazole  40 mg Intravenous Daily    piperacillin-tazobactam (Zosyn) IV (PEDS and ADULTS) (extended infusion is not appropriate)  4.5 g Intravenous Q8H     PRN Meds:acetaminophen, hydrALAZINE, melatonin, morphine, naloxone, prochlorperazine, sodium chloride 0.9%     Review of patient's allergies indicates:  No Known Allergies  Objective:     Vital Signs (Most Recent):  Temp: 98.2 °F (36.8 °C) (06/26/23 0717)  Pulse: 66 (06/26/23 0733)  Resp: 18 (06/26/23 0733)  BP: (!) 152/70 (06/26/23 0717)  SpO2: 97 % (06/26/23 0733) Vital Signs (24h Range):  Temp:  [97.9 °F (36.6 °C)-99 °F (37.2 °C)] 98.2 °F (36.8 °C)  Pulse:  [60-72] 66  Resp:  [16-18] 18  SpO2:  [94 %-97 %] 97 %  BP: (113-165)/(56-72) 152/70     Weight: 91.6 kg (201 lb 15.1 oz)  Body mass index is 28.98 kg/m².    Intake/Output - Last 3 Shifts         06/24 0700 06/25 0659 06/25 0700 06/26 0659 06/26 0700 06/27 0659    I.V. (mL/kg) 0 (0)      Total Intake(mL/kg) 0 (0)      Urine (mL/kg/hr) 550 (0.3) 100 (0)     Drains 40 165     Total Output 590 265     Net -590 -265            Urine Occurrence  1 x              Physical Exam  Vitals and nursing note reviewed.   Constitutional:       General: He is not in acute distress.     Appearance: Normal appearance. He is ill-appearing. He is not toxic-appearing.   HENT:      Head: Normocephalic.      Right Ear: External ear normal.      Left Ear: External ear normal.      Nose: Nose normal.   Eyes:      Conjunctiva/sclera: Conjunctivae normal.   Cardiovascular:      Rate and Rhythm: Normal rate.   Pulmonary:      Effort: Pulmonary effort is  normal. No respiratory distress.   Abdominal:      Comments: Soft, +mild distention, appropriate TTP, NANCY with SS drainage, midline incision  staples intact clean and dry   Skin:     General: Skin is warm and dry.   Neurological:      Mental Status: He is alert and oriented to person, place, and time.   Psychiatric:         Mood and Affect: Mood normal.         Behavior: Behavior normal.        Significant Labs:  I have reviewed all pertinent lab results within the past 24 hours.  CBC:   Recent Labs   Lab 06/26/23 0429   WBC 11.15   RBC 3.29*   HGB 9.3*   HCT 29.6*      MCV 90   MCH 28.3   MCHC 31.4*     BMP:   Recent Labs   Lab 06/26/23  0429   *   *   K 3.5   *   CO2 20*   BUN 35*   CREATININE 1.6*   CALCIUM 8.6*       Significant Diagnostics:  I have reviewed all pertinent imaging results/findings within the past 24 hours.

## 2023-06-26 NOTE — ANESTHESIA POSTPROCEDURE EVALUATION
Anesthesia Post Evaluation    Patient: Jake Ortiz    Procedure(s) Performed: Procedure(s) (LRB):  LAPAROSCOPY, DIAGNOSTIC (N/A)  LAPAROTOMY, EXPLORATORY (N/A)  HEMICOLECTOMY, RIGHT (N/A)  BIOPSY, ABDOMINAL WALL (N/A)  BLOCK, TRANSVERSUS ABDOMINIS PLANE (N/A)    Final Anesthesia Type: general      Patient location during evaluation: PACU  Patient participation: Yes- Able to Participate  Level of consciousness: awake and alert  Post-procedure vital signs: reviewed and stable  Pain management: adequate  Airway patency: patent  NASREEN mitigation strategies: Multimodal analgesia  PONV status at discharge: No PONV  Anesthetic complications: no      Cardiovascular status: blood pressure returned to baseline  Respiratory status: unassisted and spontaneous ventilation  Hydration status: euvolemic  Follow-up not needed.          Vitals Value Taken Time   /70 06/26/23 0717   Temp 36.8 °C (98.2 °F) 06/26/23 0717   Pulse 66 06/26/23 0733   Resp 18 06/26/23 0733   SpO2 97 % 06/26/23 0733         Event Time   Out of Recovery 23:19:31         Pain/Thomas Score: Pain Rating Prior to Med Admin: 8 (6/25/2023  8:04 PM)  Pain Rating Post Med Admin: 0 (6/25/2023  8:34 PM)

## 2023-06-26 NOTE — PT/OT/SLP PROGRESS
"Occupational Therapy   Treatment    Name: Jake Ortiz  MRN: [id number]  Admitting Diagnosis:  Perforated appendicitis  4 Days Post-Op    Recommendations:     Discharge Recommendations: home health OT  Discharge Equipment Recommendations:  none  Barriers to discharge:  None    Assessment:     Jake Ortiz is a 83 y.o. male with a medical diagnosis of Perforated appendicitis.  He presents with the following performance deficits affecting function are weakness, impaired endurance, impaired self care skills, impaired functional mobility, impaired balance, impaired cardiopulmonary response to activity.     Rehab Prognosis:  Good; patient would benefit from acute skilled OT services to address these deficits and reach maximum level of function.       Plan:     Patient to be seen 2 x/week to address the above listed problems via self-care/home management, therapeutic activities, therapeutic exercises  Plan of Care Expires: 07/08/23  Plan of Care Reviewed with: patient    Subjective     Chief Complaint: Reported "I am feeling better."  Patient/Family Comments/goals: none reported  Pain/Comfort:  Pain Rating 1: 0/10    Objective:     Communicated with: NurseArielle, prior to session.  Patient found supine with peripheral IV, telemetry upon OT entry to room.    General Precautions: Standard, fall    Orthopedic Precautions:N/A  Braces: N/A  Respiratory Status: Room air     Occupational Performance:     Bed Mobility:    OOB in chair at start and end of treatment session     Functional Mobility/Transfers:  Patient completed Sit <> Stand Transfer with stand by assistance  with  rolling walker   Patient completed Bed <> Chair Transfer using Step Transfer technique with stand by assistance with rolling walker  Functional Mobility: Patient completed x100ft x2 trials functional mobility with RW and SBA to increase dynamic standing balance and activity tolerance needed for ADL completion.    Activities of Daily Living:  Upper Body " Dressing: setup donned gown backwards as robe with setup.    [facility] 6 Click ADL: 21    Treatment & Education:  Patient tolerated intervention well overall. Provided with v/c for technique with transfers to increase safety and independence. Encouraged completion of B UE AROM therex throughout the day to increase functional strength and activity tolerance needed for ADL completion. Increased time with mobility due to high patient distractibility. Educated on benefits of OOB activity and need to call for A to transfer. Patient and family member present stated understanding and in agreement with POC.    Patient left up in chair with all lines intact, call button in reach, chair alarm on, and male family member present    GOALS:   Multidisciplinary Problems       Occupational Therapy Goals          Problem: Occupational Therapy    Goal Priority Disciplines Outcome Interventions   Occupational Therapy Goal     OT, PT/OT Ongoing, Progressing    Description: LTG'S TO BE MET IN 14 DAYS (7/8/23)    1)  PATIENT WILL PERFORM UB DRESSING WITH (I) TO DECREASE (D) ON CAREGIVER.    2)  PATIENT WILL PERFORM LB DRESSING WITH SBA TO DECREASE (D) ON CAREGIVER..    3)  PATIENT WILL PERFORM TOILET T/F WITH SBA TO DECREASE (D) ON CAREGIVER..    4)  PATIENT WILL STAND AT SINK X5-X10 MIN TO PERFORM SIMPLE GROOMING/HYGIENE WITH (S) FOR BALANCE.                       Time Tracking:     OT Date of Treatment: 06/26/23  OT Start Time: 1115  OT Stop Time: 1140  OT Total Time (min): 25 min    Billable Minutes:Therapeutic Activity 25 6/26/2023

## 2023-06-26 NOTE — PROGRESS NOTES
[facility])  General Surgery  Progress Note    Subjective:     History of Present Illness:  83 y.o. male with PMHx of CHF, CAD, DM, HTN who presented to the ED for generalized abdominal pain.  Describes the pain as starting around 6:00 a.m. this morning after having a bowel movement.  The pain was sharp and constant.  Presented to the ER where CT scan was shown concerning for extraluminal air fluid collection near where the appendix should be near the cecum concerning for possible perforation/abscess.  Surgery consulted for evaluation.  Per the patient and his wife, the patient has had intermittent abdominal pain and diarrhea over the past 6 weeks but the pain this morning is different than anything he is ever experienced.  Denies previous abdominal surgical history.  Is on Eliquis per Cardiology.      Post-Op Info:  Procedure(s) (LRB):  LAPAROSCOPY, DIAGNOSTIC (N/A)  LAPAROTOMY, EXPLORATORY (N/A)  HEMICOLECTOMY, RIGHT (N/A)  BIOPSY, ABDOMINAL WALL (N/A)  BLOCK, TRANSVERSUS ABDOMINIS PLANE (N/A)   4 Days Post-Op     Interval History: Denies n/v. Some flatus last night, no BM. Ambulating with assistance. Pain well controlled.    Medications:  Continuous Infusions:   dextrose 5 % and 0.45 % NaCl       Scheduled Meds:   fluticasone propionate  2 spray Each Nostril Daily    heparin (porcine)  5,000 Units Subcutaneous Q8H    miconazole NITRATE 2 %   Topical (Top) BID    mupirocin   Nasal BID    pantoprazole  40 mg Intravenous Daily    piperacillin-tazobactam (Zosyn) IV (PEDS and ADULTS) (extended infusion is not appropriate)  4.5 g Intravenous Q8H     PRN Meds:acetaminophen, hydrALAZINE, melatonin, morphine, naloxone, prochlorperazine, sodium chloride 0.9%     Review of patient's allergies indicates:  No Known Allergies  Objective:     Vital Signs (Most Recent):  Temp: 98.2 °F (36.8 °C) (06/26/23 0717)  Pulse: 66 (06/26/23 0733)  Resp: 18 (06/26/23 0733)  BP: (!) 152/70 (06/26/23 0717)  SpO2: 97  % (06/26/23 0733) Vital Signs (24h Range):  Temp:  [97.9 °F (36.6 °C)-99 °F (37.2 °C)] 98.2 °F (36.8 °C)  Pulse:  [60-72] 66  Resp:  [16-18] 18  SpO2:  [94 %-97 %] 97 %  BP: (113-165)/(56-72) 152/70     Weight: 91.6 kg (201 lb 15.1 oz)  Body mass index is 28.98 kg/m².    Intake/Output - Last 3 Shifts         06/24 0700 06/25 0659 06/25 0700 06/26 0659 06/26 0700 06/27 0659    I.V. (mL/kg) 0 (0)      Total Intake(mL/kg) 0 (0)      Urine (mL/kg/hr) 550 (0.3) 100 (0)     Drains 40 165     Total Output 590 265     Net -590 -265            Urine Occurrence  1 x              Physical Exam  Vitals and nursing note reviewed.   Constitutional:       General: He is not in acute distress.     Appearance: Normal appearance. He is ill-appearing. He is not toxic-appearing.   HENT:      Head: Normocephalic.      Right Ear: External ear normal.      Left Ear: External ear normal.      Nose: Nose normal.   Eyes:      Conjunctiva/sclera: Conjunctivae normal.   Cardiovascular:      Rate and Rhythm: Normal rate.   Pulmonary:      Effort: Pulmonary effort is normal. No respiratory distress.   Abdominal:      Comments: Soft, +mild distention, appropriate TTP, NANCY with SS drainage, midline incision  staples intact clean and dry   Skin:     General: Skin is warm and dry.   Neurological:      Mental Status: He is alert and oriented to person, place, and time.   Psychiatric:         Mood and Affect: Mood normal.         Behavior: Behavior normal.        Significant Labs:  I have reviewed all pertinent lab results within the past 24 hours.  CBC:   Recent Labs   Lab 06/26/23 0429   WBC 11.15   RBC 3.29*   HGB 9.3*   HCT 29.6*      MCV 90   MCH 28.3   MCHC 31.4*     BMP:   Recent Labs   Lab 06/26/23 0429   *   *   K 3.5   *   CO2 20*   BUN 35*   CREATININE 1.6*   CALCIUM 8.6*       Significant Diagnostics:  I have reviewed all pertinent imaging results/findings within the past 24 hours.    Assessment/Plan:     *  Perforated appendicitis  82yo F with perforation/abscess/plegmon in the RLQ concerning for perforation of appendix vs cecum vs small bowel s/p open [facility] 06/23/23    - continue IV abx  - NPO  - NG tube fell out Saturday night - no nausea or emesis & passing some flatus. 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

## 2023-06-26 NOTE — SUBJECTIVE & OBJECTIVE
Interval History: Patient denies any issues overnight.       Review of Systems   Constitutional:  Negative for fatigue and fever.   HENT:  Negative for sinus pressure.    Eyes:  Negative for visual disturbance.   Respiratory:  Negative for shortness of breath.    Cardiovascular:  Negative for chest pain.   Gastrointestinal:  Positive for abdominal pain (improving). Negative for nausea and vomiting.   Genitourinary:  Negative for difficulty urinating.   Musculoskeletal:  Negative for back pain.   Skin:  Negative for rash.   Neurological:  Negative for headaches.   Psychiatric/Behavioral:  Negative for confusion.    Objective:     Vital Signs (Most Recent):  Temp: 98.2 °F (36.8 °C) (06/26/23 0717)  Pulse: 66 (06/26/23 0733)  Resp: 18 (06/26/23 0733)  BP: (!) 152/70 (06/26/23 0717)  SpO2: 97 % (06/26/23 0733) Vital Signs (24h Range):  Temp:  [97.9 °F (36.6 °C)-99 °F (37.2 °C)] 98.2 °F (36.8 °C)  Pulse:  [60-72] 66  Resp:  [16-18] 18  SpO2:  [94 %-97 %] 97 %  BP: (113-165)/(56-72) 152/70     Weight: 91.6 kg (201 lb 15.1 oz)  Body mass index is 28.98 kg/m².    Intake/Output Summary (Last 24 hours) at 6/26/2023 1005  Last data filed at 6/26/2023 0607  Gross per 24 hour   Intake --   Output 220 ml   Net -220 ml         Physical Exam  Constitutional:       General: He is not in acute distress.     Appearance: He is well-developed. He is not diaphoretic.   HENT:      Head: Normocephalic and atraumatic.   Eyes:      Pupils: Pupils are equal, round, and reactive to light.   Cardiovascular:      Rate and Rhythm: Normal rate and regular rhythm.      Heart sounds: Normal heart sounds. No murmur heard.    No friction rub. No gallop.   Pulmonary:      Effort: Pulmonary effort is normal. No respiratory distress.      Breath sounds: Normal breath sounds. No stridor. No wheezing or rales.   Abdominal:      General: There is no distension.      Palpations: Abdomen is soft. There is no mass.      Tenderness: There is abdominal  tenderness (mild). There is no guarding.      Comments: Decreased bowel sounds     Musculoskeletal:      Right lower leg: No edema.      Left lower leg: No edema.   Skin:     General: Skin is warm.      Findings: No erythema.   Neurological:      Mental Status: He is alert and oriented to person, place, and time.           Significant Labs: All pertinent labs within the past 24 hours have been reviewed.    Significant Imaging: I have reviewed all pertinent imaging results/findings within the past 24 hours.

## 2023-06-26 NOTE — PROGRESS NOTES
'[place] - Telemetry ([facility])  [facility] Medicine  Progress Note    Patient Name: Jake Ortiz  MRN: [id number]  Patient Class: IP- Inpatient   Admission Date: 6/22/2023  Length of Stay: 3 days  Attending Physician: Chano Sanz MD  Primary Care Provider: Byron Stevens MD        Subjective:     Principal Problem:Perforated appendicitis        HPI:  Mr. Ortiz is a 84 yo male with hx of prostate cancer (not on active treatment), Afib on Eliquis, CHF, and CAD s/p CABG x 3 presented with abdominal pain that has gotten progressively worse over the last 6 weeks, but it was significantly worse this morning and therefore the wife brought him into the hospital.  On CT abdomen patient was found to have a perforated appendix and general surgery was consulted.  Dr. Moody recommended IR consulted for IR drain placement. Dr. Landin recommended CT with PO/IV contrast but due to renal function IV contrast cannot be given.  Patient is NPO and continues to have abdominal tenderness and guarding. Hospital medicine will admit to inpatient for perforated appendix, follow up on IR and GS recommendaitons and IV antibiotics.          Overview/Hospital Course:  Gen Surg evaluated and due to peritonitis, opted to take patient to diagnostic laparoscopy on 06/22 which was then converted to an ex-lap. Pt was found to have walled off perforation in R abdomen near appendix/cecum with intraabdominal purulence, underwent R hemicolectomy. Postop, NGT remains in place, awaiting return of bowel function.   6/26: cont npo per surgery. Fluids changed to d5w 1/2ns. Cont zosyn.       Interval History: Patient denies any issues overnight.       Review of Systems   Constitutional:  Negative for fatigue and fever.   HENT:  Negative for sinus pressure.    Eyes:  Negative for visual disturbance.   Respiratory:  Negative for shortness of breath.    Cardiovascular:  Negative for chest pain.   Gastrointestinal:  Positive for abdominal pain (improving). Negative for  nausea and vomiting.   Genitourinary:  Negative for difficulty urinating.   Musculoskeletal:  Negative for back pain.   Skin:  Negative for rash.   Neurological:  Negative for headaches.   Psychiatric/Behavioral:  Negative for confusion.    Objective:     Vital Signs (Most Recent):  Temp: 98.2 °F (36.8 °C) (06/26/23 0717)  Pulse: 66 (06/26/23 0733)  Resp: 18 (06/26/23 0733)  BP: (!) 152/70 (06/26/23 0717)  SpO2: 97 % (06/26/23 0733) Vital Signs (24h Range):  Temp:  [97.9 °F (36.6 °C)-99 °F (37.2 °C)] 98.2 °F (36.8 °C)  Pulse:  [60-72] 66  Resp:  [16-18] 18  SpO2:  [94 %-97 %] 97 %  BP: (113-165)/(56-72) 152/70     Weight: 91.6 kg (201 lb 15.1 oz)  Body mass index is 28.98 kg/m².    Intake/Output Summary (Last 24 hours) at 6/26/2023 1005  Last data filed at 6/26/2023 0607  Gross per 24 hour   Intake --   Output 220 ml   Net -220 ml         Physical Exam  Constitutional:       General: He is not in acute distress.     Appearance: He is well-developed. He is not diaphoretic.   HENT:      Head: Normocephalic and atraumatic.   Eyes:      Pupils: Pupils are equal, round, and reactive to light.   Cardiovascular:      Rate and Rhythm: Normal rate and regular rhythm.      Heart sounds: Normal heart sounds. No murmur heard.    No friction rub. No gallop.   Pulmonary:      Effort: Pulmonary effort is normal. No respiratory distress.      Breath sounds: Normal breath sounds. No stridor. No wheezing or rales.   Abdominal:      General: There is no distension.      Palpations: Abdomen is soft. There is no mass.      Tenderness: There is abdominal tenderness (mild). There is no guarding.      Comments: Decreased bowel sounds     Musculoskeletal:      Right lower leg: No edema.      Left lower leg: No edema.   Skin:     General: Skin is warm.      Findings: No erythema.   Neurological:      Mental Status: He is alert and oriented to person, place, and time.           Significant Labs: All pertinent labs within the past 24 hours  have been reviewed.    Significant Imaging: I have reviewed all pertinent imaging results/findings within the past 24 hours.      Assessment/Plan:      * Perforated appendicitis  - CT abdomen with PO contrast: Unchanged air fluid collection adjacent to the cecum.  No oral contrast is seen within this collection. Increasing airspace opacities within the lower lobes bilaterally suggesting atelectasis or pneumonia.   - Gen surg consulted- s/p ex-lap with R hemicolectomy on 06/22/23  - continue NPO, NGT for now pending return of bowel function per surgery   - Continue IV Zosyn   - Continue IV Morphine q4h PRN for pain control   - continue gentle IVF while NPO     Cont npo  Cont abx  IVF    Preoperative cardiovascular examination        Stage 4 chronic kidney disease  Cr 1.4-1.7 recently   Appears to be close to baseline   Monitor BMP; renally dose meds   Avoid nephrotoxic agents     Anemia associated with stage 4 chronic renal failure  - monitor CBC   - transfuse for Hgb < 8 or if pt symptomatic     Chronic combined systolic and diastolic congestive heart failure  Patient is identified as having Combined Systolic and Diastolic heart failure that is Chronic. CHF is currently controlled and uncontrolled due to Continued edema of extremities. Latest ECHO performed and demonstrates- Results for orders placed during the hospital encounter of 05/10/23    Echo    Interpretation Summary  · Eccentric hypertrophy and low normal systolic function.  · Moderate left atrial enlargement.  · The estimated ejection fraction is 50%.  · Indeterminate left ventricular diastolic function.  · There is abnormal septal wall motion consistent with left bundle branch block.  · Normal right ventricular size with normal right ventricular systolic function.  · Mild-to-moderate mitral regurgitation.  · Mild to moderate tricuspid regurgitation.  - hold home diuretic, ARB while NPO    -  Give IV lasix 20 mg x 1 for BLE edema   - strict Is and Os   -  Judicious IV fluids while NPO     Atrial fibrillation with chronic anticoagulation with Eliquis  Patient with Permanent atrial fibrillation which is controlled currently with no medication. Patient is currently in sinus rhythm.[id number] Score: 4.. Anticoagulation indicated. Anticoagulation done with Eliquis.  - Eliquis on hold given surgical intervention   - resume anticoag when cleared from a surgical standpoint to do so         S/P CABG x 3  - Patient on Eliquis - last dose 6/21 PM  - Hold Eliquis until cleared from a surgical standpoint to do so   - Cardiology consulted for preop eval       Hyperlipemia  - resume statin on discharge      Hypertension  - hold PO meds while NGT in place   - monitor BP   - IV hydralazine as needed         VTE Risk Mitigation (From admission, onward)         Ordered     heparin (porcine) injection 5,000 Units  Every 8 hours         06/24/23 1409     IP VTE HIGH RISK PATIENT  Once         06/22/23 1405     Place sequential compression device  Until discontinued         06/22/23 1405                Discharge Planning   LITTLE:      Code Status: Full Code   Is the patient medically ready for discharge?: No    Reason for patient still in hospital (select all that apply): Patient trending condition  Discharge Plan A: Home Health, Home with family                  Chano Sanz MD  Department of Hospital Medicine   [facility] - Telemetry ([facility])

## 2023-06-26 NOTE — ASSESSMENT & PLAN NOTE
84yo F with perforation/abscess/plegmon in the RLQ concerning for perforation of appendix vs cecum vs small bowel s/p open [facility] 06/23/23    - continue IV abx  - NPO  - NG tube fell out Saturday night - no nausea or emesis & passing some flatus. If increased distention, no bowel function, n/v will need replacement of NG tube  - awaiting bowel function return  - IV medications for pain  - incentive spirometry  - encouraged OOB, ambulation

## 2023-06-26 NOTE — ASSESSMENT & PLAN NOTE
- CT abdomen with PO contrast: Unchanged air fluid collection adjacent to the cecum.  No oral contrast is seen within this collection. Increasing airspace opacities within the lower lobes bilaterally suggesting atelectasis or pneumonia.   - Gen surg consulted- s/p ex-lap with R hemicolectomy on 06/22/23  - continue NPO, NGT for now pending return of bowel function per surgery   - Continue IV Zosyn   - Continue IV Morphine q4h PRN for pain control   - continue gentle IVF while NPO     Cont npo  Cont abx  IVF

## 2023-06-26 NOTE — PT/OT/SLP PROGRESS
Physical Therapy Treatment    Patient Name:  Jake Ortiz   MRN:  [id number]    Recommendations:     Discharge Recommendations: home health PT  Discharge Equipment Recommendations: none  Barriers to discharge: None    Assessment:     Jake Ortiz is a 83 y.o. male admitted with a medical diagnosis of Perforated appendicitis.  He presents with the following impairments/functional limitations: weakness, impaired endurance, impaired balance, gait instability, impaired functional mobility, decreased safety awareness, decreased coordination.    Rehab Prognosis: Good; patient would benefit from acute skilled PT services to address these deficits and reach maximum level of function.    Recent Surgery: Procedure(s) (LRB):  LAPAROSCOPY, DIAGNOSTIC (N/A)  LAPAROTOMY, EXPLORATORY (N/A)  HEMICOLECTOMY, RIGHT (N/A)  BIOPSY, ABDOMINAL WALL (N/A)  BLOCK, TRANSVERSUS ABDOMINIS PLANE (N/A) 4 Days Post-Op    Plan:     During this hospitalization, patient to be seen 3 x/week to address the identified rehab impairments via gait training, therapeutic activities, therapeutic exercises and progress toward the following goals:    Plan of Care Expires:  07/08/23    Subjective     Chief Complaint: NONE, EAGER TO WALK  Patient/Family Comments/goals:   Pain/Comfort:  Pain Rating 1: 0/10      Objective:     Communicated with NURSE GREGORY prior to session.  Patient found up in chair with peripheral IV, telemetry upon PT entry to room.     General Precautions: Standard, fall  Orthopedic Precautions: N/A  Braces: N/A  Respiratory Status: Room air     Functional Mobility:  Bed Mobility:     Scooting: supervision  Transfers:     Sit to Stand:  stand by assistance with rolling walker  Gait: PT ' WITH RW AND SBA, MULTIPLE STANDING STOP FOR CONVERSATION SO INCREASED STATIC STAND, NO LOB OR SOB ON ROOM AIR  Balance: GOOD SITTING AND STANDING BALANCE    AM-PAC 6 CLICK MOBILITY  Turning over in bed (including adjusting bedclothes, sheets and  "blankets)?: 3  Sitting down on and standing up from a chair with arms (e.g., wheelchair, bedside commode, etc.): 4  Moving from lying on back to sitting on the side of the bed?: 3  Moving to and from a bed to a chair (including a wheelchair)?: 4  Need to walk in hospital room?: 4  Climbing 3-5 steps with a railing?: 1  Basic Mobility Total Score: 19     Treatment & Education:  PT EDUCATION:  -ROLE OF P.T. AND POC IN ACUTE CARE HOSPITAL SETTING  -RW USE AND SAFETY DURING TF'S AND GAIT  -ENCOURAGED TO INCREASE TIME OOB IN CHAIR TO TOLERANCE TO INCREASE CV ENDURANCE  -TO CONTINUE THERAPUETIC EXERCISES THROUGHOUT THE DAY TO INCREASE ACTIVITY TOLERANCE AND DECREASE RISK FOR PNEUMONIA AND BLOOD CLOTS: HIP FLEX/EXT, HIP ABD/ADD, QUAD SET, HEEL SLIDE, AP  PT EDUCATED ON RISK FOR FALLS DUE TO GENERALIZED WEAKNESS, EDUCATED ON "CALL DON'T FALL", ENCOURAGED TO CALL FOR ASSISTANCE WITH ALL NEEDS SUCH AS BED<>CHAIR TRANSFERS OR TRIPS TO BATHROOM, PT AGREEABLE TO SAFETY PRECAUTIONS    Patient left up in chair with all lines intact, call button in reach, NURSE notified, and FAMILY present..    GOALS:   Multidisciplinary Problems       Physical Therapy Goals          Problem: Physical Therapy    Goal Priority Disciplines Outcome Goal Variances Interventions   Physical Therapy Goal     PT, PT/OT Ongoing, Progressing     Description: LTG to be met by 07/08/23:    Bed mob: SPV  Transfers: SPV with RW  Gait: SPV with RW x100 feet                       Time Tracking:     PT Received On: 06/26/23  PT Start Time: 1100     PT Stop Time: 1125  PT Total Time (min): 25 min     Billable Minutes: Gait Training 10 and Therapeutic Activity 15    Treatment Type: Treatment  PT/PTA: PT     Number of PTA visits since last PT visit: 0     06/26/2023  "

## 2023-06-26 NOTE — PLAN OF CARE
Problem: Adult Inpatient Plan of Care  Goal: Plan of Care Review  Outcome: Ongoing, Progressing  Goal: Patient-Specific Goal (Individualized)  Outcome: Ongoing, Progressing  Goal: Absence of Hospital-Acquired Illness or Injury  Outcome: Ongoing, Progressing  Goal: Optimal Comfort and Wellbeing  Outcome: Ongoing, Progressing  Goal: Readiness for Transition of Care  Outcome: Ongoing, Progressing     Problem: Diabetes Comorbidity  Goal: Blood Glucose Level Within Targeted Range  Outcome: Ongoing, Progressing     Problem: Fluid and Electrolyte Imbalance (Acute Kidney Injury/Impairment)  Goal: Fluid and Electrolyte Balance  Outcome: Ongoing, Progressing     Problem: Oral Intake Inadequate (Acute Kidney Injury/Impairment)  Goal: Optimal Nutrition Intake  Outcome: Ongoing, Progressing     Problem: Renal Function Impairment (Acute Kidney Injury/Impairment)  Goal: Effective Renal Function  Outcome: Ongoing, Progressing     Problem: Impaired Wound Healing  Goal: Optimal Wound Healing  Outcome: Ongoing, Progressing     Problem: Fall Injury Risk  Goal: Absence of Fall and Fall-Related Injury  Outcome: Ongoing, Progressing     Problem: Infection  Goal: Absence of Infection Signs and Symptoms  Outcome: Ongoing, Progressing     Problem: Skin Injury Risk Increased  Goal: Skin Health and Integrity  Outcome: Ongoing, Progressing

## 2023-06-27 PROBLEM — E87.6 HYPOKALEMIA: Status: ACTIVE | Noted: 2023-01-01

## 2023-06-27 NOTE — ASSESSMENT & PLAN NOTE
- CT abdomen with PO contrast: Unchanged air fluid collection adjacent to the cecum.  No oral contrast is seen within this collection. Increasing airspace opacities within the lower lobes bilaterally suggesting atelectasis or pneumonia.   - Gen surg consulted- s/p ex-lap with R hemicolectomy on 06/22/23  - continue NPO, NGT for now pending return of bowel function per surgery   - Continue IV Zosyn   - Continue IV Morphine q4h PRN for pain control   - continue gentle IVF while NPO     Cont IVF   Cont zosyn  CLD started by surgery

## 2023-06-27 NOTE — ASSESSMENT & PLAN NOTE
82yo F with perforation/abscess/plegmon in the RLQ concerning for perforation of appendix vs cecum vs small bowel s/p open [facility] 06/23/23    - continue IV abx  - advance to CLD  - incentive spirometry  - encouraged OOB, ambulation

## 2023-06-27 NOTE — PROGRESS NOTES
'[place] [facility]  [facility]  Progress Note    Patient Name: Jake Ortiz  MRN: [id number]  Patient Class: IP- Inpatient   Admission Date: 6/22/2023  Length of Stay: 4 days  Attending Physician: Chano Sanz MD  Primary Care Provider: Byron Stevens MD        Subjective:     Principal Problem:Perforated appendicitis        HPI:  Mr. Ortiz is a 82 yo male with hx of prostate cancer (not on active treatment), Afib on Eliquis, CHF, and CAD s/p CABG x 3 presented with abdominal pain that has gotten progressively worse over the last 6 weeks, but it was significantly worse this morning and therefore the wife brought him into the hospital.  On CT abdomen patient was found to have a perforated appendix and general surgery was consulted.  Dr. Moody recommended IR consulted for IR drain placement. Dr. Landin recommended CT with PO/IV contrast but due to renal function IV contrast cannot be given.  Patient is NPO and continues to have abdominal tenderness and guarding. Hospital medicine will admit to inpatient for perforated appendix, follow up on IR and GS recommendaitons and IV antibiotics.          Overview/Hospital Course:  Gen Surg evaluated and due to peritonitis, opted to take patient to diagnostic laparoscopy on 06/22 which was then converted to an ex-lap. Pt was found to have walled off perforation in R abdomen near appendix/cecum with intraabdominal purulence, underwent R hemicolectomy. Postop, NGT remains in place, awaiting return of bowel function.   6/26: cont npo per surgery. Fluids changed to d5w 1/2ns. Cont zosyn.   6/27: CLD started by surgery. Cont zosyn.       Interval History: No issues overnight. Having bowel movements.     Review of Systems   Constitutional:  Negative for fatigue and fever.   HENT:  Negative for sinus pressure.    Eyes:  Negative for visual disturbance.   Respiratory:  Negative for shortness of breath.    Cardiovascular:  Negative for chest pain.   Gastrointestinal:   Positive for abdominal pain (improving). Negative for nausea and vomiting.   Genitourinary:  Negative for difficulty urinating.   Musculoskeletal:  Negative for back pain.   Skin:  Negative for rash.   Neurological:  Negative for headaches.   Psychiatric/Behavioral:  Negative for confusion.    Objective:     Vital Signs (Most Recent):  Temp: 98 °F (36.7 °C) (06/27/23 1114)  Pulse: 72 (06/27/23 1114)  Resp: 18 (06/27/23 1114)  BP: (!) 147/77 (06/27/23 1114)  SpO2: 100 % (06/27/23 1114) Vital Signs (24h Range):  Temp:  [97.1 °F (36.2 °C)-98 °F (36.7 °C)] 98 °F (36.7 °C)  Pulse:  [58-72] 72  Resp:  [16-18] 18  SpO2:  [95 %-100 %] 100 %  BP: (128-147)/(60-77) 147/77     Weight: 91.6 kg (201 lb 15.1 oz)  Body mass index is 28.98 kg/m².    Intake/Output Summary (Last 24 hours) at 6/27/2023 1203  Last data filed at 6/27/2023 0508  Gross per 24 hour   Intake 2901.14 ml   Output 60 ml   Net 2841.14 ml         Physical Exam  Constitutional:       General: He is not in acute distress.     Appearance: He is well-developed. He is not diaphoretic.   HENT:      Head: Normocephalic and atraumatic.   Eyes:      Pupils: Pupils are equal, round, and reactive to light.   Cardiovascular:      Rate and Rhythm: Normal rate and regular rhythm.      Heart sounds: Normal heart sounds. No murmur heard.    No friction rub. No gallop.   Pulmonary:      Effort: Pulmonary effort is normal. No respiratory distress.      Breath sounds: Normal breath sounds. No stridor. No wheezing or rales.   Abdominal:      General: There is no distension.      Palpations: Abdomen is soft. There is no mass.      Tenderness: There is abdominal tenderness (mild). There is no guarding.      Comments: Decreased bowel sounds     Musculoskeletal:      Right lower leg: No edema.      Left lower leg: No edema.   Skin:     General: Skin is warm.      Findings: No erythema.   Neurological:      Mental Status: He is alert and oriented to person, place, and time.            Significant Labs: All pertinent labs within the past 24 hours have been reviewed.    Significant Imaging: I have reviewed all pertinent imaging results/findings within the past 24 hours.        Assessment/Plan:      * Perforated appendicitis  - CT abdomen with PO contrast: Unchanged air fluid collection adjacent to the cecum.  No oral contrast is seen within this collection. Increasing airspace opacities within the lower lobes bilaterally suggesting atelectasis or pneumonia.   - Gen surg consulted- s/p ex-lap with R hemicolectomy on 06/22/23  - continue NPO, NGT for now pending return of bowel function per surgery   - Continue IV Zosyn   - Continue IV Morphine q4h PRN for pain control   - continue gentle IVF while NPO     Cont IVF   Cont zosyn  CLD started by surgery     Hypokalemia  Potassium chloride given       Preoperative cardiovascular examination        Stage 4 chronic kidney disease  Cr 1.4-1.7 recently   Appears to be close to baseline   Monitor BMP; renally dose meds   Avoid nephrotoxic agents     Anemia associated with stage 4 chronic renal failure  - monitor CBC   - transfuse for Hgb < 8 or if pt symptomatic     Chronic combined systolic and diastolic congestive heart failure  Patient is identified as having Combined Systolic and Diastolic heart failure that is Chronic. CHF is currently controlled and uncontrolled due to Continued edema of extremities. Latest ECHO performed and demonstrates- Results for orders placed during the hospital encounter of 05/10/23    Echo    Interpretation Summary  · Eccentric hypertrophy and low normal systolic function.  · Moderate left atrial enlargement.  · The estimated ejection fraction is 50%.  · Indeterminate left ventricular diastolic function.  · There is abnormal septal wall motion consistent with left bundle branch block.  · Normal right ventricular size with normal right ventricular systolic function.  · Mild-to-moderate mitral regurgitation.  · Mild to  moderate tricuspid regurgitation.  - hold home diuretic, ARB while NPO    -  Give IV lasix 20 mg x 1 for BLE edema   - strict Is and Os   - Judicious IV fluids while NPO     Will dc ivf as pt is started on CLD    Atrial fibrillation with chronic anticoagulation with Eliquis  Patient with Permanent atrial fibrillation which is controlled currently with no medication. Patient is currently in sinus rhythm.[id number] Score: 4.. Anticoagulation indicated. Anticoagulation done with Eliquis.  - Eliquis on hold given surgical intervention   - resume anticoag when cleared from a surgical standpoint to do so         S/P CABG x 3  - Patient on Eliquis - last dose 6/21 PM  - Hold Eliquis until cleared from a surgical standpoint to do so   - Cardiology consulted for preop eval       Hyperlipemia  - resume statin on discharge      Hypertension  - hold PO meds while NGT in place   - monitor BP   - IV hydralazine as needed         VTE Risk Mitigation (From admission, onward)         Ordered     heparin (porcine) injection 5,000 Units  Every 8 hours         06/24/23 1409     IP VTE HIGH RISK PATIENT  Once         06/22/23 1405     Place sequential compression device  Until discontinued         06/22/23 1405                Discharge Planning   LITTLE:      Code Status: Full Code   Is the patient medically ready for discharge?: No    Reason for patient still in hospital (select all that apply): Patient trending condition  Discharge Plan A: Home Health, Home with family                  Chano Sanz MD  Department of Hospital Medicine   [facility] - Telemetry (Hospital)

## 2023-06-27 NOTE — PLAN OF CARE
Problem: Adult Inpatient Plan of Care  Goal: Plan of Care Review  Outcome: Ongoing, Progressing  Goal: Absence of Hospital-Acquired Illness or Injury  Outcome: Ongoing, Progressing  Intervention: Identify and Manage Fall Risk  Flowsheets (Taken 6/26/2023 1952)  Safety Promotion/Fall Prevention:   side rails raised x 2   assistive device/personal item within reach   Fall Risk reviewed with patient/family   Fall Risk signage in place   nonskid shoes/socks when out of bed   instructed to call staff for mobility  Intervention: Prevent Skin Injury  Flowsheets (Taken 6/26/2023 1952)  Skin Protection:   adhesive use limited   tubing/devices free from skin contact   skin sealant/moisture barrier applied  Intervention: Prevent Infection  Flowsheets (Taken 6/26/2023 1952)  Infection Prevention:   environmental surveillance performed   personal protective equipment utilized   rest/sleep promoted   single patient room provided   equipment surfaces disinfected   hand hygiene promoted  Goal: Optimal Comfort and Wellbeing  Outcome: Ongoing, Progressing  Intervention: Monitor Pain and Promote Comfort  Flowsheets (Taken 6/26/2023 1952)  Pain Management Interventions:   position adjusted   pain management plan reviewed with patient/caregiver   quiet environment facilitated   care clustered     Problem: Oral Intake Inadequate (Acute Kidney Injury/Impairment)  Goal: Optimal Nutrition Intake  Outcome: Ongoing, Progressing  Intervention: Promote and Optimize Nutrition  Flowsheets (Taken 6/26/2023 1952)  Oral Nutrition Promotion: rest periods promoted     Problem: Impaired Wound Healing  Goal: Optimal Wound Healing  Outcome: Ongoing, Progressing     Problem: Fall Injury Risk  Goal: Absence of Fall and Fall-Related Injury  Outcome: Ongoing, Progressing  Intervention: Promote Injury-Free Environment  Flowsheets (Taken 6/26/2023 1952)  Safety Promotion/Fall Prevention:   side rails raised x 2   assistive device/personal item within reach    Fall Risk reviewed with patient/family   Fall Risk signage in place   nonskid shoes/socks when out of bed   instructed to call staff for mobility     Problem: Infection  Goal: Absence of Infection Signs and Symptoms  Outcome: Ongoing, Progressing  Intervention: Prevent or Manage Infection  Flowsheets (Taken 6/26/2023 1952)  Fever Reduction/Comfort Measures: lightweight bedding     Problem: Skin Injury Risk Increased  Goal: Skin Health and Integrity  Outcome: Ongoing, Progressing  Intervention: Optimize Skin Protection  Flowsheets (Taken 6/26/2023 1952)  Skin Protection:   adhesive use limited   tubing/devices free from skin contact   skin sealant/moisture barrier applied  Intervention: Promote and Optimize Oral Intake  Flowsheets (Taken 6/26/2023 1952)  Oral Nutrition Promotion: rest periods promoted

## 2023-06-27 NOTE — PT/OT/SLP PROGRESS
Physical Therapy Treatment    Patient Name:  Jake Ortiz   MRN:  [id number]    Recommendations:     Discharge Recommendations: home health PT  Discharge Equipment Recommendations: none  Barriers to discharge: None    Assessment:     Jake Ortiz is a 83 y.o. male admitted with a medical diagnosis of Perforated appendicitis.  He presents with the following impairments/functional limitations: weakness, impaired endurance, impaired functional mobility, gait instability, impaired balance, decreased safety awareness, decreased coordination.    Rehab Prognosis: Good; patient would benefit from acute skilled PT services to address these deficits and reach maximum level of function.    Recent Surgery: Procedure(s) (LRB):  LAPAROSCOPY, DIAGNOSTIC (N/A)  LAPAROTOMY, EXPLORATORY (N/A)  HEMICOLECTOMY, RIGHT (N/A)  BIOPSY, ABDOMINAL WALL (N/A)  BLOCK, TRANSVERSUS ABDOMINIS PLANE (N/A) 5 Days Post-Op    Plan:     During this hospitalization, patient to be seen 3 x/week to address the identified rehab impairments via gait training, therapeutic activities, therapeutic exercises and progress toward the following goals:    Plan of Care Expires:  07/08/23    Subjective     Chief Complaint: NONE, READY TO WALK  Patient/Family Comments/goals:   Pain/Comfort:  Pain Rating 1: 2/10  Location 1: abdomen      Objective:     Communicated with [facility] prior to session.  Patient found up in chair with telemetry, peripheral IV upon PT entry to room.     General Precautions: Standard, fall  Orthopedic Precautions: N/A  Braces: ABD. 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, PRINCE NOTIFIED OF THIS INCIDENT  Balance: GOOD  "SITTING BALANCE, FAIR DYNAMIC BALANCE DURING GAIT    AM-PAC 6 CLICK MOBILITY  Turning over in bed (including adjusting bedclothes, sheets and blankets)?: 3  Sitting down on and standing up from a chair with arms (e.g., wheelchair, bedside commode, etc.): 4  Moving from lying on back to sitting on the side of the bed?: 3  Moving to and from a bed to a chair (including a wheelchair)?: 4  Need to walk in hospital room?: 4  Climbing 3-5 steps with a railing?: 1  Basic Mobility Total Score: 19     Treatment & Education:  PT EDUCATION:  - ROLE OF P.T. AND POC IN ACUTE CARE HOSPITAL SETTING  - RW USE AND SAFETY DURING TF'S AND GAIT  - RISK FOR FALLS DUE TO GENERALIZED WEAKNESS, EDUCATED ON "CALL DON'T FALL", ENCOURAGED TO CALL FOR ASSISTANCE WITH ALL NEEDS SUCH AS BED<>CHAIR TRANSFERS OR TRIPS TO BATHROOM, PT AGREEABLE TO SAFETY PRECAUTIONS    Patient left up in chair with all lines intact, call button in reach, NURSE notified, and NURSE AND FAMILY present..    GOALS:   Multidisciplinary Problems       Physical Therapy Goals          Problem: Physical Therapy    Goal Priority Disciplines Outcome Goal Variances Interventions   Physical Therapy Goal     PT, PT/OT Ongoing, Progressing     Description: LTG to be met by 07/08/23:    Bed mob: SPV  Transfers: SPV with RW  Gait: SPV with RW x100 feet                       Time Tracking:     PT Received On: 06/27/23  PT Start Time: 0905     PT Stop Time: 0915  PT Total Time (min): 10 min     Billable Minutes: Therapeutic Activity 10    Treatment Type: Treatment  PT/PTA: PT     Number of PTA visits since last PT visit: 0     06/27/2023  "

## 2023-06-27 NOTE — PT/OT/SLP PROGRESS
Physical Therapy Treatment    Patient Name:  Jake Ortiz   MRN:  [id number]    Recommendations:     Discharge Recommendations: home health PT  Discharge Equipment Recommendations: none  Barriers to discharge: None    Assessment:     Jake Ortiz is a 83 y.o. male admitted with a medical diagnosis of Perforated appendicitis.  He presents with the following impairments/functional limitations: weakness, impaired endurance, impaired functional mobility, gait instability, impaired balance, decreased safety awareness, decreased coordination.    Rehab Prognosis: Good; patient would benefit from acute skilled PT services to address these deficits and reach maximum level of function.    Recent Surgery: Procedure(s) (LRB):  LAPAROSCOPY, DIAGNOSTIC (N/A)  LAPAROTOMY, EXPLORATORY (N/A)  HEMICOLECTOMY, RIGHT (N/A)  BIOPSY, ABDOMINAL WALL (N/A)  BLOCK, TRANSVERSUS ABDOMINIS PLANE (N/A) 5 Days Post-Op    Plan:     During this hospitalization, patient to be seen 3 x/week to address the identified rehab impairments via gait training, therapeutic activities, therapeutic exercises and progress toward the following goals:    Plan of Care Expires:  07/08/23    Subjective     Chief Complaint: Pt reports just walking down the abdi but would like to walk some more.  Patient/Family Comments/goals: none stated  Pain/Comfort:  Pain Rating 1: 0/10      Objective:     Communicated with nurse Guzmán prior to session.  Patient found  standing in room  with peripheral IV, telemetry upon PT entry to room.     General Precautions: Standard, fall  Orthopedic Precautions: N/A  Braces: N/A  Respiratory Status: Room air     Functional Mobility:  Gait: Pt ambulated 400ft SBA suing RW, no LOB, no c/o dizziness or SOB  Balance: Demonstrated good sitting balance, good dynamic balance during gait      AM-PAC 6 CLICK MOBILITY  Turning over in bed (including adjusting bedclothes, sheets and blankets)?: 3  Sitting down on and standing up from a chair with  arms (e.g., wheelchair, bedside commode, etc.): 4  Moving from lying on back to sitting on the side of the bed?: 3  Moving to and from a bed to a chair (including a wheelchair)?: 4  Need to walk in hospital room?: 4  Climbing 3-5 steps with a railing?: 1 (NT)  Basic Mobility Total Score: 19     Treatment & Education:  Pt educated on role of PT in acute care and POC. Educated on importance of OOB activities and HEP (hip flex, LAQ, ham curls, ankle pumps) in order to maintain/regain strength. Educated on proper use of RW for safety and to reduce risk of falling. Educated on increased risk of falling due to weakness, instructed to utilize call bell for assistance for all transfers. Pt agreeable to all requests.    Patient left up in chair with all lines intact, call button in reach, chair alarm on, and family present.    GOALS:   Multidisciplinary Problems       Physical Therapy Goals          Problem: Physical Therapy    Goal Priority Disciplines Outcome Goal Variances Interventions   Physical Therapy Goal     PT, PT/OT Ongoing, Progressing     Description: LTG to be met by 07/08/23:    Bed mob: SPV  Transfers: SPV with RW  Gait: SPV with RW x100 feet                       Time Tracking:     PT Received On: 06/27/23  PT Start Time: 1355     PT Stop Time: 1405  PT Total Time (min): 10 min     Billable Minutes: Gait Training 10min    Treatment Type: Treatment  PT/PTA: PT     Number of PTA visits since last PT visit: 0     06/27/2023

## 2023-06-27 NOTE — PROGRESS NOTES
[facility]  General Surgery  Progress Note    Subjective:     History of Present Illness:  83 y.o. male with PMHx of CHF, CAD, DM, HTN who presented to the ED for generalized abdominal pain.  Describes the pain as starting around 6:00 a.m. this morning after having a bowel movement.  The pain was sharp and constant.  Presented to the ER where CT scan was shown concerning for extraluminal air fluid collection near where the appendix should be near the cecum concerning for possible perforation/abscess.  Surgery consulted for evaluation.  Per the patient and his wife, the patient has had intermittent abdominal pain and diarrhea over the past 6 weeks but the pain this morning is different than anything he is ever experienced.  Denies previous abdominal surgical history.  Is on Eliquis per Cardiology.      Post-Op Info:  Procedure(s) (LRB):  LAPAROSCOPY, DIAGNOSTIC (N/A)  LAPAROTOMY, EXPLORATORY (N/A)  HEMICOLECTOMY, RIGHT (N/A)  BIOPSY, ABDOMINAL WALL (N/A)  BLOCK, TRANSVERSUS ABDOMINIS PLANE (N/A)   5 Days Post-Op     Interval History: Denies n/v. Passing flatus & BM. Well controlled pain. Ambulating halls.     Medications:  Continuous Infusions:   dextrose 5 % and 0.45 % NaCl Stopped (06/26/23 1720)     Scheduled Meds:   fluticasone propionate  2 spray Each Nostril Daily    heparin (porcine)  5,000 Units Subcutaneous Q8H    miconazole NITRATE 2 %   Topical (Top) BID    mupirocin   Nasal BID    pantoprazole  40 mg Intravenous Daily    piperacillin-tazobactam (Zosyn) IV (PEDS and ADULTS) (extended infusion is not appropriate)  4.5 g Intravenous Q8H     PRN Meds:acetaminophen, hydrALAZINE, melatonin, morphine, naloxone, prochlorperazine, sodium chloride 0.9%     Review of patient's allergies indicates:  No Known Allergies  Objective:     Vital Signs (Most Recent):  Temp: 97.6 °F (36.4 °C) (06/27/23 0818)  Pulse: 64 (06/27/23 0818)  Resp: 18 (06/27/23 0818)  BP: (!) 143/65 (06/27/23 0818)  SpO2:  96 % (06/27/23 0818) Vital Signs (24h Range):  Temp:  [97.1 °F (36.2 °C)-98 °F (36.7 °C)] 97.6 °F (36.4 °C)  Pulse:  [58-72] 64  Resp:  [16-18] 18  SpO2:  [95 %-98 %] 96 %  BP: (128-145)/(60-67) 143/65     Weight: 91.6 kg (201 lb 15.1 oz)  Body mass index is 28.98 kg/m².    Intake/Output - Last 3 Shifts         06/25 0700 06/26 0659 06/26 0700 06/27 0659 06/27 0700 06/28 0659    I.V. (mL/kg)  1748.9 (19.1)     IV Piggyback  1152.3     Total Intake(mL/kg)  2901.1 (31.7)     Urine (mL/kg/hr) 100 (0)      Drains 165 60     Total Output 265 60     Net -265 +2841.1            Urine Occurrence 1 x               Physical Exam  Vitals and nursing note reviewed.   Constitutional:       General: He is not in acute distress.     Appearance: Normal appearance. He is ill-appearing. He is not toxic-appearing.   HENT:      Head: Normocephalic.      Right Ear: External ear normal.      Left Ear: External ear normal.      Nose: Nose normal.   Eyes:      Conjunctiva/sclera: Conjunctivae normal.   Cardiovascular:      Rate and Rhythm: Normal rate.   Pulmonary:      Effort: Pulmonary effort is normal. No respiratory distress.   Abdominal:      Comments: Soft, no distention, appropriate TTP, NANCY with serous drainage, midline incision with staples intact clean and dry   Skin:     General: Skin is warm and dry.   Neurological:      Mental Status: He is alert and oriented to person, place, and time.   Psychiatric:         Mood and Affect: Mood normal.         Behavior: Behavior normal.        Significant Labs:  I have reviewed all pertinent lab results within the past 24 hours.  CBC:   Recent Labs   Lab 06/27/23  0451   WBC 9.81   RBC 3.17*   HGB 9.0*   HCT 28.7*      MCV 91   MCH 28.4   MCHC 31.4*     BMP:   Recent Labs   Lab 06/27/23  0451         K 3.0*   *   CO2 21*   BUN 31*   CREATININE 1.4   CALCIUM 8.4*       Significant Diagnostics:  I have reviewed all pertinent imaging results/findings within the past  24 hours.    Assessment/Plan:     * Perforated appendicitis  84yo F with perforation/abscess/plegmon in the RLQ concerning for perforation of appendix vs cecum vs small bowel s/p open [facility] 06/23/23    - continue IV abx  - advance to CLD  - incentive spirometry  - encouraged OOB, ambulation    Anemia associated with stage 4 chronic renal failure  Care per primary team    Chronic combined systolic and diastolic congestive heart failure  Care per primary team      Atrial fibrillation with chronic anticoagulation with Eliquis  Care per primary team    S/P CABG x 3  Care per primary team    Hyperlipemia  Care per primary team    Hypertension  Care per primary team        Pablo Lawton PA-C  General Surgery  [facility] - Telemetry ([facility])

## 2023-06-27 NOTE — ASSESSMENT & PLAN NOTE
Patient is identified as having Combined Systolic and Diastolic heart failure that is Chronic. CHF is currently controlled and uncontrolled due to Continued edema of extremities. Latest ECHO performed and demonstrates- Results for orders placed during the hospital encounter of 05/10/23    Echo    Interpretation Summary  · Eccentric hypertrophy and low normal systolic function.  · Moderate left atrial enlargement.  · The estimated ejection fraction is 50%.  · Indeterminate left ventricular diastolic function.  · There is abnormal septal wall motion consistent with left bundle branch block.  · Normal right ventricular size with normal right ventricular systolic function.  · Mild-to-moderate mitral regurgitation.  · Mild to moderate tricuspid regurgitation.  - hold home diuretic, ARB while NPO    -  Give IV lasix 20 mg x 1 for BLE edema   - strict Is and Os   - Judicious IV fluids while NPO     Will dc ivf as pt is started on CLD

## 2023-06-27 NOTE — SUBJECTIVE & OBJECTIVE
Interval History: Denies n/v. Passing flatus & BM. Well controlled pain. Ambulating halls.     Medications:  Continuous Infusions:   dextrose 5 % and 0.45 % NaCl Stopped (06/26/23 1720)     Scheduled Meds:   fluticasone propionate  2 spray Each Nostril Daily    heparin (porcine)  5,000 Units Subcutaneous Q8H    miconazole NITRATE 2 %   Topical (Top) BID    mupirocin   Nasal BID    pantoprazole  40 mg Intravenous Daily    piperacillin-tazobactam (Zosyn) IV (PEDS and ADULTS) (extended infusion is not appropriate)  4.5 g Intravenous Q8H     PRN Meds:acetaminophen, hydrALAZINE, melatonin, morphine, naloxone, prochlorperazine, sodium chloride 0.9%     Review of patient's allergies indicates:  No Known Allergies  Objective:     Vital Signs (Most Recent):  Temp: 97.6 °F (36.4 °C) (06/27/23 0818)  Pulse: 64 (06/27/23 0818)  Resp: 18 (06/27/23 0818)  BP: (!) 143/65 (06/27/23 0818)  SpO2: 96 % (06/27/23 0818) Vital Signs (24h Range):  Temp:  [97.1 °F (36.2 °C)-98 °F (36.7 °C)] 97.6 °F (36.4 °C)  Pulse:  [58-72] 64  Resp:  [16-18] 18  SpO2:  [95 %-98 %] 96 %  BP: (128-145)/(60-67) 143/65     Weight: 91.6 kg (201 lb 15.1 oz)  Body mass index is 28.98 kg/m².    Intake/Output - Last 3 Shifts         06/25 0700 06/26 0659 06/26 0700 06/27 0659 06/27 0700 06/28 0659    I.V. (mL/kg)  1748.9 (19.1)     IV Piggyback  1152.3     Total Intake(mL/kg)  2901.1 (31.7)     Urine (mL/kg/hr) 100 (0)      Drains 165 60     Total Output 265 60     Net -265 +2841.1            Urine Occurrence 1 x               Physical Exam  Vitals and nursing note reviewed.   Constitutional:       General: He is not in acute distress.     Appearance: Normal appearance. He is ill-appearing. He is not toxic-appearing.   HENT:      Head: Normocephalic.      Right Ear: External ear normal.      Left Ear: External ear normal.      Nose: Nose normal.   Eyes:      Conjunctiva/sclera: Conjunctivae normal.   Cardiovascular:      Rate and Rhythm: Normal rate.    Pulmonary:      Effort: Pulmonary effort is normal. No respiratory distress.   Abdominal:      Comments: Soft, no distention, appropriate TTP, NANCY with serous drainage, midline incision with staples intact clean and dry   Skin:     General: Skin is warm and dry.   Neurological:      Mental Status: He is alert and oriented to person, place, and time.   Psychiatric:         Mood and Affect: Mood normal.         Behavior: Behavior normal.        Significant Labs:  I have reviewed all pertinent lab results within the past 24 hours.  CBC:   Recent Labs   Lab 06/27/23  0451   WBC 9.81   RBC 3.17*   HGB 9.0*   HCT 28.7*      MCV 91   MCH 28.4   MCHC 31.4*     BMP:   Recent Labs   Lab 06/27/23  0451         K 3.0*   *   CO2 21*   BUN 31*   CREATININE 1.4   CALCIUM 8.4*       Significant Diagnostics:  I have reviewed all pertinent imaging results/findings within the past 24 hours.

## 2023-06-28 PROBLEM — I47.20 V-TACH: Status: ACTIVE | Noted: 2023-01-01

## 2023-06-28 NOTE — PROGRESS NOTES
"[place] - Telemetry ([facility])  Cardiology  Progress Note    Patient Name: Jake Ortiz  MRN: [id number]  Admission Date: 6/22/2023  Hospital Length of Stay: 5 days  Code Status: Full Code   Attending Physician: Evert Barnes MD   Primary Care Physician: Byron Stevens MD  Expected Discharge Date:   Principal Problem:Perforated appendicitis    Subjective:   HPI:  History of Present Illness: Jake Ortiz is a 82 y.o. male patient with a PMHx of atrial fibrillation, CHF, CKD, CAD s/p CABG, DM2, HLD, HTN, MI, NASREEN, and prostate cancer who presents to the Emergency Department because he was sent by Dr. Hood (Cardiology) for IV diuresis. Currently, the pt is taking 20 mg of Lasix at home. Pt c/o generalized weakness, SOB upon exertion, and bilateral leg swelling. Pt's wife also reports that the pt has chronic diarrhea. Pt has been having 2 episodes of diarrhea per day. Symptoms are constant and moderate in severity. No mitigating or exacerbating factors reported. Patient denies any fever, chills, cough, CP, numbness, headaches, and all other sxs at this time. No further complaints or concerns at this time.   Patient seen and examined and improved CHF and edema. Plan continued diuresis for next 24 hours.    Hospital Course:   6/28/23-Cardiology asked to see patient due to run of NSVT overnight. Reviewed telemetry, patient with apparent polymorphic VT. Patient seen and examined today, sitting up in bedside chair. Feels well overall. States he "dozed" off during VT episode overnight but does not remember having any CP or SOB. No CV complaints. Labs reviewed, K low at 3.0, being repleted. Mg 1.9. Prior echo from 5/10/23 with EF of 50%, repeat ordered. Troponin flat.          Review of Systems   Constitutional: Positive for malaise/fatigue.   HENT: Negative.     Eyes: Negative.    Cardiovascular:  Positive for leg swelling and syncope.   Respiratory: Negative.     Endocrine: Negative.  Negative for polydipsia. "   Hematologic/Lymphatic: Negative.    Skin: Negative.    Musculoskeletal:  Positive for arthritis and joint pain.   Gastrointestinal:         Incisional soreness   Genitourinary: Negative.    Psychiatric/Behavioral: Negative.     Allergic/Immunologic: Negative.    Objective:     Vital Signs (Most Recent):  Temp: 97.4 °F (36.3 °C) (06/28/23 1219)  Pulse: 65 (06/28/23 1219)  Resp: 18 (06/28/23 1219)  BP: (!) 146/74 (06/28/23 1219)  SpO2: 98 % (06/28/23 1219) Vital Signs (24h Range):  Temp:  [97.4 °F (36.3 °C)-98.7 °F (37.1 °C)] 97.4 °F (36.3 °C)  Pulse:  [61-74] 65  Resp:  [17-20] 18  SpO2:  [96 %-99 %] 98 %  BP: (122-166)/(62-81) 146/74     Weight: 91.6 kg (201 lb 15.1 oz)  Body mass index is 28.98 kg/m².     SpO2: 98 %         Intake/Output Summary (Last 24 hours) at 6/28/2023 1435  Last data filed at 6/28/2023 1331  Gross per 24 hour   Intake 583.71 ml   Output 100 ml   Net 483.71 ml       Lines/Drains/Airways       Drain  Duration                  Closed/Suction Drain 06/22/23 2155 Superior;Midline Abdomen Bulb 15 Fr. 5 days              Peripheral Intravenous Line  Duration                  Peripheral IV - Single Lumen 06/25/23 1834 18 G Anterior;Distal;Right Forearm 2 days                       Physical Exam  Vitals and nursing note reviewed.   Constitutional:       General: He is not in acute distress.     Appearance: Normal appearance. He is well-developed. He is not diaphoretic.   HENT:      Head: Normocephalic and atraumatic.   Eyes:      General:         Right eye: No discharge.         Left eye: No discharge.      Pupils: Pupils are equal, round, and reactive to light.   Neck:      Thyroid: No thyromegaly.      Vascular: No JVD.      Trachea: No tracheal deviation.   Cardiovascular:      Rate and Rhythm: Normal rate and regular rhythm.      Heart sounds: Normal heart sounds, S1 normal and S2 normal. No murmur heard.     Comments: Sternotomy site well-healed  CRT-P site healed  Pulmonary:      Effort:  Pulmonary effort is normal. No respiratory distress.      Breath sounds: Normal breath sounds. No wheezing or rales.   Abdominal:      General: There is no distension.      Tenderness: There is no rebound.      Comments: NANCY drain in place  Incision with staples, C/D/I   Musculoskeletal:      Cervical back: Neck supple.      Right lower leg: Edema (mild) present.      Left lower leg: Edema (mild) present.   Skin:     General: Skin is warm and dry.      Findings: No erythema.   Neurological:      Mental Status: He is alert and oriented to person, place, and time.   Psychiatric:         Mood and Affect: Mood normal.         Behavior: Behavior normal.         Thought Content: Thought content normal.          Significant Labs: CMP   Recent Labs   Lab 06/27/23  0451 06/28/23  0334    143   K 3.0* 3.4*   * 111*   CO2 21* 18*    123*   BUN 31* 24*   CREATININE 1.4 1.3   CALCIUM 8.4* 8.4*   PROT  --  6.4   ALBUMIN  --  2.2*   BILITOT  --  0.5   ALKPHOS  --  74   AST  --  35   ALT  --  24   ANIONGAP 13 14   , CBC   Recent Labs   Lab 06/27/23  0451   WBC 9.81   HGB 9.0*   HCT 28.7*      , Troponin   Recent Labs   Lab 06/28/23  0334 06/28/23  1308   TROPONINI 0.057* 0.046*   , and All pertinent lab results from the last 24 hours have been reviewed.    Significant Imaging: Echocardiogram: Transthoracic echo (TTE) complete (Cupid Only):   Results for orders placed or performed during the hospital encounter of 05/10/23   Echo   Result Value Ref Range    BSA 2.14 m2    LA WIDTH 4.20 cm    IVC diameter 2.87 cm    Left Ventricular Outflow Tract Mean Velocity 0.56 cm/s    Left Ventricular Outflow Tract Mean Gradient 1.55 mmHg    LVIDd 6.51 (A) 3.5 - 6.0 cm    IVS 0.96 0.6 - 1.1 cm    Posterior Wall 0.85 0.6 - 1.1 cm    Ao root annulus 3.83 cm    LVIDs 4.59 (A) 2.1 - 4.0 cm    FS 29 28 - 44 %    LA volume 132.20 cm3    STJ 3.46 cm    Ascending aorta 3.17 cm    LV mass 250.23 g    LA size 5.31 cm    RVDD 4.24  cm    TAPSE 1.90 cm    Left Ventricle Relative Wall Thickness 0.26 cm    AV mean gradient 6 mmHg    AV valve area 1.83 cm2    AV Velocity Ratio 0.58     AV index (prosthetic) 0.57     MV valve area p 1/2 method 2.75 cm2    E/A ratio 3.60     E wave deceleration time 276.19 msec    IVRT 51.38 msec    LVOT diameter 2.02 cm    LVOT area 3.2 cm2    LVOT peak wilian 0.91 m/s    LVOT peak VTI 16.70 cm    Ao peak wilian 1.57 m/s    Ao VTI 29.3 cm    RVOT peak wilian 0.77 m/s    RVOT peak VTI 14.5 cm    Mr max wilian 4.78 m/s    LVOT stroke volume 53.49 cm3    AV peak gradient 10 mmHg    PV mean gradient 1.03 mmHg    MV Peak E Wilian 1.08 m/s    TR Max Wilian 3.08 m/s    MV stenosis pressure 1/2 time 80.09 ms    MV Peak A Wilian 0.30 m/s    LV Systolic Volume 96.76 mL    LV Systolic Volume Index 46.3 mL/m2    LV Diastolic Volume 216.76 mL    LV Diastolic Volume Index 103.71 mL/m2    LA Volume Index 63.3 mL/m2    LV Mass Index 120 g/m2    RA Major Axis 7.76 cm    Left Atrium Minor Axis 6.88 cm    Left Atrium Major Axis 7.07 cm    Triscuspid Valve Regurgitation Peak Gradient 38 mmHg    EF 50 %    Narrative    · Eccentric hypertrophy and low normal systolic function.  · Moderate left atrial enlargement.  · The estimated ejection fraction is 50%.  · Indeterminate left ventricular diastolic function.  · There is abnormal septal wall motion consistent with left bundle branch   block.  · Normal right ventricular size with normal right ventricular systolic   function.  · Mild-to-moderate mitral regurgitation.  · Mild to moderate tricuspid regurgitation.      , EKG: Reviewed, and X-Ray: CXR: X-Ray Chest 1 View (CXR): No results found for this visit on 06/22/23. and X-Ray Chest PA and Lateral (CXR): No results found for this visit on 06/22/23.    Assessment and Plan:   Patient who presented with perforated appendicitis. Noted to have episode of VT overnight with questionable syncope. Suspect mediated by hypokalemia. BB added. Repeat echo and device  interrogation pending.     * Perforated appendicitis  As per general surgery     V-tach  -Episode of what appears to polymorphic VT overnight-lasted 10-15 seconds with questionable syncope per patient  -No CP or SOB  -Suspect mediated by hypokalemia (K 3.0)  -Needs K > 4, Mg > 2  -Toprol XL initiated  -Repeat echo and device interrogation pending    Hypokalemia  -Repletion as per hospital medicine, needs to be > 4    Preoperative cardiovascular examination  Euvolemic   Angina free   No contraindications from cardiac standpoint to proceed with surgery   Hold eliquis for at least 48 hours, last dose in pm yesterday   Hold torsemide, monitor I/O , gentle hydration   He is at moderate risk, non modifiable but stable and ok to proceed with surgery     Chronic combined systolic and diastolic congestive heart failure  -Clinically compensated  -Diurese prn  -Resume home regimen as tolerated  -Repeat echo pending given episode of VT overnight        Atrial fibrillation with chronic anticoagulation with Eliquis  -History of PAF  -Resume AC once ok from general surgery standpoint    S/P CABG x 3  -Stable and asymptomatic   -Resume home meds as tolerated    Hypertension  -Resume home meds as tolerated        VTE Risk Mitigation (From admission, onward)         Ordered     heparin (porcine) injection 5,000 Units  Every 8 hours         06/24/23 1409     IP VTE HIGH RISK PATIENT  Once         06/22/23 1405     Place sequential compression device  Until discontinued         06/22/23 1405                Anuradha Richardson PA-C  Cardiology  '[place] - Telemetry ([facility])

## 2023-06-28 NOTE — ASSESSMENT & PLAN NOTE
82yo F with perforation/abscess/plegmon in the RLQ concerning for perforation of appendix vs cecum vs small bowel s/p open [facility] 06/23/23    - continue IV abx  - continue CLD since had some n/v yesterday  - incentive spirometry  - encouraged OOB, ambulation

## 2023-06-28 NOTE — ASSESSMENT & PLAN NOTE
- CT abdomen with PO contrast: Unchanged air fluid collection adjacent to the cecum.  No oral contrast is seen within this collection. Increasing airspace opacities within the lower lobes bilaterally suggesting atelectasis or pneumonia.   - Gen surg consulted- s/p ex-lap with R hemicolectomy on 06/22/23  - continue NPO, NGT for now pending return of bowel function per surgery   - Continue IV Zosyn   - Continue IV Morphine q4h PRN for pain control   - continue gentle IVF while NPO     Cont IVF   Cont zosyn  CLD started by surgery - pt had a bout of vomiting 10 hours after italian icee last night.

## 2023-06-28 NOTE — ASSESSMENT & PLAN NOTE
-Episode of what appears to polymorphic VT overnight-lasted 10-15 seconds with questionable syncope per patient  -No CP or SOB  -Suspect mediated by hypokalemia (K 3.0)  -Needs K > 4, Mg > 2  -Toprol XL initiated  -Repeat echo and device interrogation pending

## 2023-06-28 NOTE — SUBJECTIVE & OBJECTIVE
Review of Systems   Constitutional: Positive for malaise/fatigue.   HENT: Negative.     Eyes: Negative.    Cardiovascular:  Positive for leg swelling and syncope.   Respiratory: Negative.     Endocrine: Negative.  Negative for polydipsia.   Hematologic/Lymphatic: Negative.    Skin: Negative.    Musculoskeletal:  Positive for arthritis and joint pain.   Gastrointestinal:         Incisional soreness   Genitourinary: Negative.    Psychiatric/Behavioral: Negative.     Allergic/Immunologic: Negative.    Objective:     Vital Signs (Most Recent):  Temp: 97.4 °F (36.3 °C) (06/28/23 1219)  Pulse: 65 (06/28/23 1219)  Resp: 18 (06/28/23 1219)  BP: (!) 146/74 (06/28/23 1219)  SpO2: 98 % (06/28/23 1219) Vital Signs (24h Range):  Temp:  [97.4 °F (36.3 °C)-98.7 °F (37.1 °C)] 97.4 °F (36.3 °C)  Pulse:  [61-74] 65  Resp:  [17-20] 18  SpO2:  [96 %-99 %] 98 %  BP: (122-166)/(62-81) 146/74     Weight: 91.6 kg (201 lb 15.1 oz)  Body mass index is 28.98 kg/m².     SpO2: 98 %         Intake/Output Summary (Last 24 hours) at 6/28/2023 1435  Last data filed at 6/28/2023 1331  Gross per 24 hour   Intake 583.71 ml   Output 100 ml   Net 483.71 ml       Lines/Drains/Airways       Drain  Duration                  Closed/Suction Drain 06/22/23 2155 Superior;Midline Abdomen Bulb 15 Fr. 5 days              Peripheral Intravenous Line  Duration                  Peripheral IV - Single Lumen 06/25/23 1834 18 G Anterior;Distal;Right Forearm 2 days                       Physical Exam  Vitals and nursing note reviewed.   Constitutional:       General: He is not in acute distress.     Appearance: Normal appearance. He is well-developed. He is not diaphoretic.   HENT:      Head: Normocephalic and atraumatic.   Eyes:      General:         Right eye: No discharge.         Left eye: No discharge.      Pupils: Pupils are equal, round, and reactive to light.   Neck:      Thyroid: No thyromegaly.      Vascular: No JVD.      Trachea: No tracheal deviation.    Cardiovascular:      Rate and Rhythm: Normal rate and regular rhythm.      Heart sounds: Normal heart sounds, S1 normal and S2 normal. No murmur heard.     Comments: Sternotomy site well-healed  CRT-P site healed  Pulmonary:      Effort: Pulmonary effort is normal. No respiratory distress.      Breath sounds: Normal breath sounds. No wheezing or rales.   Abdominal:      General: There is no distension.      Tenderness: There is no rebound.      Comments: NANCY drain in place  Incision with staples, C/D/I   Musculoskeletal:      Cervical back: Neck supple.      Right lower leg: Edema (mild) present.      Left lower leg: Edema (mild) present.   Skin:     General: Skin is warm and dry.      Findings: No erythema.   Neurological:      Mental Status: He is alert and oriented to person, place, and time.   Psychiatric:         Mood and Affect: Mood normal.         Behavior: Behavior normal.         Thought Content: Thought content normal.          Significant Labs: CMP   Recent Labs   Lab 06/27/23  0451 06/28/23  0334    143   K 3.0* 3.4*   * 111*   CO2 21* 18*    123*   BUN 31* 24*   CREATININE 1.4 1.3   CALCIUM 8.4* 8.4*   PROT  --  6.4   ALBUMIN  --  2.2*   BILITOT  --  0.5   ALKPHOS  --  74   AST  --  35   ALT  --  24   ANIONGAP 13 14   , CBC   Recent Labs   Lab 06/27/23  0451   WBC 9.81   HGB 9.0*   HCT 28.7*      , Troponin   Recent Labs   Lab 06/28/23  0334 06/28/23  1308   TROPONINI 0.057* 0.046*   , and All pertinent lab results from the last 24 hours have been reviewed.    Significant Imaging: Echocardiogram: Transthoracic echo (TTE) complete (Cupid Only):   Results for orders placed or performed during the hospital encounter of 05/10/23   Echo   Result Value Ref Range    BSA 2.14 m2    LA WIDTH 4.20 cm    IVC diameter 2.87 cm    Left Ventricular Outflow Tract Mean Velocity 0.56 cm/s    Left Ventricular Outflow Tract Mean Gradient 1.55 mmHg    LVIDd 6.51 (A) 3.5 - 6.0 cm    IVS 0.96 0.6 -  1.1 cm    Posterior Wall 0.85 0.6 - 1.1 cm    Ao root annulus 3.83 cm    LVIDs 4.59 (A) 2.1 - 4.0 cm    FS 29 28 - 44 %    LA volume 132.20 cm3    STJ 3.46 cm    Ascending aorta 3.17 cm    LV mass 250.23 g    LA size 5.31 cm    RVDD 4.24 cm    TAPSE 1.90 cm    Left Ventricle Relative Wall Thickness 0.26 cm    AV mean gradient 6 mmHg    AV valve area 1.83 cm2    AV Velocity Ratio 0.58     AV index (prosthetic) 0.57     MV valve area p 1/2 method 2.75 cm2    E/A ratio 3.60     E wave deceleration time 276.19 msec    IVRT 51.38 msec    LVOT diameter 2.02 cm    LVOT area 3.2 cm2    LVOT peak wilian 0.91 m/s    LVOT peak VTI 16.70 cm    Ao peak wilian 1.57 m/s    Ao VTI 29.3 cm    RVOT peak wilian 0.77 m/s    RVOT peak VTI 14.5 cm    Mr max wilian 4.78 m/s    LVOT stroke volume 53.49 cm3    AV peak gradient 10 mmHg    PV mean gradient 1.03 mmHg    MV Peak E Wilian 1.08 m/s    TR Max Wilian 3.08 m/s    MV stenosis pressure 1/2 time 80.09 ms    MV Peak A Wilian 0.30 m/s    LV Systolic Volume 96.76 mL    LV Systolic Volume Index 46.3 mL/m2    LV Diastolic Volume 216.76 mL    LV Diastolic Volume Index 103.71 mL/m2    LA Volume Index 63.3 mL/m2    LV Mass Index 120 g/m2    RA Major Axis 7.76 cm    Left Atrium Minor Axis 6.88 cm    Left Atrium Major Axis 7.07 cm    Triscuspid Valve Regurgitation Peak Gradient 38 mmHg    EF 50 %    Narrative    · Eccentric hypertrophy and low normal systolic function.  · Moderate left atrial enlargement.  · The estimated ejection fraction is 50%.  · Indeterminate left ventricular diastolic function.  · There is abnormal septal wall motion consistent with left bundle branch   block.  · Normal right ventricular size with normal right ventricular systolic   function.  · Mild-to-moderate mitral regurgitation.  · Mild to moderate tricuspid regurgitation.      , EKG: Reviewed, and X-Ray: CXR: X-Ray Chest 1 View (CXR): No results found for this visit on 06/22/23. and X-Ray Chest PA and Lateral (CXR): No results found for  this visit on 06/22/23.

## 2023-06-28 NOTE — HOSPITAL COURSE
"6/28/23-Cardiology asked to see patient due to run of NSVT overnight. Reviewed telemetry, patient with apparent polymorphic VT. Patient seen and examined today, sitting up in bedside chair. Feels well overall. States he "dozed" off during VT episode overnight but does not remember having any CP or SOB. No CV complaints. Labs reviewed, K low at 3.0, being repleted. Mg 1.9. Prior echo from 5/10/23 with EF of 50%, repeat ordered. Troponin flat.  "

## 2023-06-28 NOTE — PT/OT/SLP PROGRESS
"Occupational Therapy   Treatment    Name: Jake Ortiz  MRN: [id number]  Admitting Diagnosis:  Perforated appendicitis  6 Days Post-Op    Recommendations:     Discharge Recommendations: home health OT  Discharge Equipment Recommendations:  none  Barriers to discharge:  None    Assessment:     Jake Ortiz is a 83 y.o. male with a medical diagnosis of Perforated appendicitis.  He presents with the following performance deficits affecting function are weakness, impaired endurance, impaired self care skills, impaired functional mobility, impaired balance, impaired cardiopulmonary response to activity.     Rehab Prognosis:  Good; patient would benefit from acute skilled OT services to address these deficits and reach maximum level of function.       Plan:     Patient to be seen 2 x/week to address the above listed problems via self-care/home management, therapeutic activities, therapeutic exercises  Plan of Care Expires: 07/08/23  Plan of Care Reviewed with: patient, spouse    Subjective     Chief Complaint: Reported "I am doing good."  Patient/Family Comments/goals: none reported  Pain/Comfort:  Pain Rating 1: 0/10    Objective:     Communicated with: NurseCamila, prior to session.  Patient found up in chair with peripheral IV, telemetry, NANCY drain upon OT entry to room.    General Precautions: Standard, fall    Orthopedic Precautions:N/A  Braces: N/A  Respiratory Status: Room air     Occupational Performance:     Bed Mobility:    OOB in chair at start and end of treatment session.    Functional Mobility/Transfers:  Patient completed Sit <> Stand Transfer with stand by assistance  with  rolling walker   Patient completed Bed <> Chair Transfer using Step Transfer technique with stand by assistance with rolling walker  Functional Mobility: Patient completed x400ft functional mobility with RW and SBA to increase dynamic standing balance and activity tolerance needed for ADL completion.    [facility] 6 Click ADL: " 21    Treatment & Education:  Patient tolerated intervention well. Improvements in standing balance and activity tolerance noted this date. Encouraged completion of B UE AROM therex throughout the day to increase functional strength and activity tolerance needed for ADL completion. Provided with visual demonstration x3 planes of motion. Patient stated understanding, in agreement with POC, and in agreement to call for A to transfer back to bed.    Patient left up in chair with all lines intact, call button in reach, and wife present    GOALS:   Multidisciplinary Problems       Occupational Therapy Goals          Problem: Occupational Therapy    Goal Priority Disciplines Outcome Interventions   Occupational Therapy Goal     OT, PT/OT Ongoing, Progressing    Description: LTG'S TO BE MET IN 14 DAYS (7/8/23)    1)  PATIENT WILL PERFORM UB DRESSING WITH (I) TO DECREASE (D) ON CAREGIVER.    2)  PATIENT WILL PERFORM LB DRESSING WITH SBA TO DECREASE (D) ON CAREGIVER..    3)  PATIENT WILL PERFORM TOILET T/F WITH SBA TO DECREASE (D) ON CAREGIVER..    4)  PATIENT WILL STAND AT SINK X5-X10 MIN TO PERFORM SIMPLE GROOMING/HYGIENE WITH (S) FOR BALANCE.                       Time Tracking:     OT Date of Treatment: 06/28/23  OT Start Time: 1020  OT Stop Time: 1045  OT Total Time (min): 25 min    Billable Minutes:Therapeutic Activity 25    6/28/2023

## 2023-06-28 NOTE — PROGRESS NOTES
[facility] ([facility])  Colorectal Surgery  Progress Note    Subjective:     History of Present Illness:  83 y.o. male with PMHx of CHF, CAD, DM, HTN who presented to the ED for generalized abdominal pain.  Describes the pain as starting around 6:00 a.m. this morning after having a bowel movement.  The pain was sharp and constant.  Presented to the ER where CT scan was shown concerning for extraluminal air fluid collection near where the appendix should be near the cecum concerning for possible perforation/abscess.  Surgery consulted for evaluation.  Per the patient and his wife, the patient has had intermittent abdominal pain and diarrhea over the past 6 weeks but the pain this morning is different than anything he is ever experienced.  Denies previous abdominal surgical history.  Is on Eliquis per Cardiology.      Post-Op Info:  Procedure(s) (LRB):  LAPAROSCOPY, DIAGNOSTIC (N/A)  LAPAROTOMY, EXPLORATORY (N/A)  HEMICOLECTOMY, RIGHT (N/A)  BIOPSY, ABDOMINAL WALL (N/A)  BLOCK, TRANSVERSUS ABDOMINIS PLANE (N/A)   6 Days Post-Op     Interval History: Had some n/v last night after overdoing CLD. Feeling better this morning without n/v. Ambulating halls. Having flatus/BM. Denies abdominal pain.    Medications:  Continuous Infusions:  Scheduled Meds:   fluticasone propionate  2 spray Each Nostril Daily    heparin (porcine)  5,000 Units Subcutaneous Q8H    miconazole NITRATE 2 %   Topical (Top) BID    mupirocin   Nasal BID    pantoprazole  40 mg Intravenous Daily    piperacillin-tazobactam (Zosyn) IV (PEDS and ADULTS) (extended infusion is not appropriate)  4.5 g Intravenous Q8H     PRN Meds:acetaminophen, hydrALAZINE, melatonin, morphine, naloxone, prochlorperazine, sodium chloride 0.9%     Review of patient's allergies indicates:  No Known Allergies  Objective:     Vital Signs (Most Recent):  Temp: 97.4 °F (36.3 °C) (06/28/23 0729)  Pulse: 61 (06/28/23 0729)  Resp: 18 (06/28/23 0729)  BP: (!) 162/71  (06/28/23 0729)  SpO2: 99 % (06/28/23 0729) Vital Signs (24h Range):  Temp:  [97.4 °F (36.3 °C)-98.7 °F (37.1 °C)] 97.4 °F (36.3 °C)  Pulse:  [61-74] 61  Resp:  [17-20] 18  SpO2:  [96 %-100 %] 99 %  BP: (122-166)/(62-81) 162/71     Weight: 91.6 kg (201 lb 15.1 oz)  Body mass index is 28.98 kg/m².    Intake/Output - Last 3 Shifts         06/26 0700 06/27 0659 06/27 0700 06/28 0659 06/28 0700 06/29 0659    I.V. (mL/kg) 1748.9 (19.1)      IV Piggyback 1152.3 343.7     Total Intake(mL/kg) 2901.1 (31.7) 343.7 (3.8)     Urine (mL/kg/hr)       Drains 60      Total Output 60      Net +2841.1 +343.7                     Physical Exam  Vitals and nursing note reviewed.   Constitutional:       General: He is not in acute distress.     Appearance: Normal appearance. He is not toxic-appearing.   HENT:      Head: Normocephalic.      Right Ear: External ear normal.      Left Ear: External ear normal.      Nose: Nose normal.   Eyes:      Conjunctiva/sclera: Conjunctivae normal.   Cardiovascular:      Rate and Rhythm: Normal rate.   Pulmonary:      Effort: Pulmonary effort is normal. No respiratory distress.   Abdominal:      Comments: Soft, no distention, appropriate TTP, NANCY with serous drainage, midline incision with staples intact clean and dry   Skin:     General: Skin is warm and dry.   Neurological:      Mental Status: He is alert and oriented to person, place, and time.   Psychiatric:         Mood and Affect: Mood normal.         Behavior: Behavior normal.        Significant Labs:  I have reviewed all pertinent lab results within the past 24 hours.  CBC:   Recent Labs   Lab 06/27/23  0451   WBC 9.81   RBC 3.17*   HGB 9.0*   HCT 28.7*      MCV 91   MCH 28.4   MCHC 31.4*     BMP:   Recent Labs   Lab 06/28/23  0334   *      K 3.4*   *   CO2 18*   BUN 24*   CREATININE 1.3   CALCIUM 8.4*   MG 1.9       Significant Diagnostics:  I have reviewed all pertinent imaging results/findings within the past 24  hours.    Assessment/Plan:     * Perforated appendicitis  82yo F with perforation/abscess/plegmon in the RLQ concerning for perforation of appendix vs cecum vs small bowel s/p open RHC 06/23/23    - continue IV abx  - continue CLD since had some n/v yesterday  - incentive spirometry  - encouraged OOB, ambulation    Anemia associated with stage 4 chronic renal failure  Care per primary team    Chronic combined systolic and diastolic congestive heart failure  Care per primary team      Atrial fibrillation with chronic anticoagulation with Eliquis  Care per primary team    S/P CABG x 3  Care per primary team    Hyperlipemia  Care per primary team    Hypertension  Care per primary team        Pablo Lawton PA-C  Colorectal Surgery  [facility] ([facility])

## 2023-06-28 NOTE — PROGRESS NOTES
'[facility] Telemetry ([facility])  [facility] Medicine  Progress Note    Patient Name: Jake Ortiz  MRN: [id number]  Patient Class: IP- Inpatient   Admission Date: 6/22/2023  Length of Stay: 5 days  Attending Physician: Evert Barnes MD  Primary Care Provider: Byron Stevens MD        Subjective:     Principal Problem:Perforated appendicitis        HPI:  Mr. Ortiz is a 82 yo male with hx of prostate cancer (not on active treatment), Afib on Eliquis, CHF, and CAD s/p CABG x 3 presented with abdominal pain that has gotten progressively worse over the last 6 weeks, but it was significantly worse this morning and therefore the wife brought him into the hospital.  On CT abdomen patient was found to have a perforated appendix and general surgery was consulted.  Dr. Moody recommended IR consulted for IR drain placement. Dr. Landin recommended CT with PO/IV contrast but due to renal function IV contrast cannot be given.  Patient is NPO and continues to have abdominal tenderness and guarding. Hospital medicine will admit to inpatient for perforated appendix, follow up on IR and GS recommendaitons and IV antibiotics.          Overview/Hospital Course:  Gen Surg evaluated and due to peritonitis, opted to take patient to diagnostic laparoscopy on 06/22 which was then converted to an ex-lap. Pt was found to have walled off perforation in R abdomen near appendix/cecum with intraabdominal purulence, underwent R hemicolectomy. Postop, NGT remains in place, awaiting return of bowel function.   6/26: cont npo per surgery. Fluids changed to d5w 1/2ns. Cont zosyn.   6/27: CLD started by surgery. Cont zosyn.   6/28: Patient noted to vomiting this morning about 10 hours after an italian icee.  Surgery recommended continuing CLD.  Patient had a run of VTach last night and passed out.  Cardiology consulted.      Interval History: Patient sitting up in chair without acute distress.  Wife at bedside states he had an episode last night that  resulted in him slumping over in the chair and passed out for 10-15 seconds.  Telemonitoring noted Vtach.  Cardiology consulted.    Review of Systems  Objective:     Vital Signs (Most Recent):  Temp: 97.4 °F (36.3 °C) (06/28/23 1219)  Pulse: 65 (06/28/23 1219)  Resp: 18 (06/28/23 1219)  BP: (!) 146/74 (06/28/23 1219)  SpO2: 98 % (06/28/23 1219) Vital Signs (24h Range):  Temp:  [97.4 °F (36.3 °C)-98.7 °F (37.1 °C)] 97.4 °F (36.3 °C)  Pulse:  [61-74] 65  Resp:  [17-20] 18  SpO2:  [96 %-99 %] 98 %  BP: (122-166)/(62-81) 146/74     Weight: 91.6 kg (201 lb 15.1 oz)  Body mass index is 28.98 kg/m².    Intake/Output Summary (Last 24 hours) at 6/28/2023 1339  Last data filed at 6/28/2023 1331  Gross per 24 hour   Intake 583.71 ml   Output 100 ml   Net 483.71 ml         Physical Exam  Vitals and nursing note reviewed.   Constitutional:       Appearance: Normal appearance.   HENT:      Head: Normocephalic and atraumatic.      Nose: Nose normal.      Mouth/Throat:      Mouth: Mucous membranes are moist.   Eyes:      Extraocular Movements: Extraocular movements intact.      Conjunctiva/sclera: Conjunctivae normal.   Cardiovascular:      Rate and Rhythm: Normal rate and regular rhythm.      Pulses: Normal pulses.      Heart sounds: Normal heart sounds.   Pulmonary:      Effort: Pulmonary effort is normal.      Breath sounds: Normal breath sounds.   Abdominal:      General: Abdomen is flat. There is no distension.      Palpations: Abdomen is soft.      Comments: NANCY with serous drainage, midline incision with staples intact clean and dry, decrease bowel sounds   Musculoskeletal:         General: Normal range of motion.      Cervical back: Normal range of motion and neck supple.   Skin:     General: Skin is warm.      Capillary Refill: Capillary refill takes less than 2 seconds.   Neurological:      Mental Status: He is alert and oriented to person, place, and time. Mental status is at baseline.   Psychiatric:         Mood and  Affect: Mood normal.         Behavior: Behavior normal.           Significant Labs: All pertinent labs within the past 24 hours have been reviewed.  Recent Lab Results         06/28/23  0334        Albumin 2.2       Alkaline Phosphatase 74       ALT 24       Anion Gap 14       AST 35       BILIRUBIN TOTAL 0.5  Comment: For infants and newborns, interpretation of results should be based  on gestational age, weight and in agreement with clinical  observations.    Premature Infant recommended reference ranges:  Up to 24 hours.............<8.0 mg/dL  Up to 48 hours............<12.0 mg/dL  3-5 days..................<15.0 mg/dL  6-29 days.................<15.0 mg/dL         BUN 24       Calcium 8.4       Chloride 111       CO2 18       Creatinine 1.3       eGFR 55       Glucose 123       Magnesium 1.9       Phosphorus 1.9       Potassium 3.4       PROTEIN TOTAL 6.4       Sodium 143       Troponin I 0.057  Comment: The reference interval for Troponin I represents the 99th percentile   cutoff   for our facility and is consistent with 3rd generation assay   performance.                 Significant Imaging:   CT Abdomen Pelvis  Without Contrast   Final Result      Unchanged air fluid collection adjacent to the cecum.  No oral contrast is seen within this collection.      Increasing airspace opacities within the lower lobes bilaterally suggesting atelectasis or pneumonia.      Additional findings are unchanged.         Electronically signed by: Eliot Callahan   Date:    06/22/2023   Time:    15:04      CT Renal Stone Study ABD Pelvis WO   Final Result      Extraluminal air-fluid collection in the expected location of the appendix suspicious for appendicitis with perforation and abscess formation.  There is developing mild to moderate perihepatic ascites.      Submucosal fat deposition within the colon as discussed which appears similar to the prior study.  This is nonspecific.      Absent right kidney.      Cholelithiasis.       Developing small right pleural effusion with unchanged cardiomegaly.      Report was discussed with Dr. Lacey at 09:35.         Electronically signed by: Eliot Callahan   Date:    06/22/2023   Time:    09:38             Assessment/Plan:      * Perforated appendicitis  - CT abdomen with PO contrast: Unchanged air fluid collection adjacent to the cecum.  No oral contrast is seen within this collection. Increasing airspace opacities within the lower lobes bilaterally suggesting atelectasis or pneumonia.   - Gen surg consulted- s/p ex-lap with R hemicolectomy on 06/22/23  - continue NPO, NGT for now pending return of bowel function per surgery   - Continue IV Zosyn   - Continue IV Morphine q4h PRN for pain control   - continue gentle IVF while NPO     Cont IVF   Cont zosyn  CLD started by surgery - pt had a bout of vomiting 10 hours after italian icee last night.    V-tach  - occurred overnight on 6/27  - cardiology reconsulted  - Electrolyte stable.      Hypokalemia  Potassium chloride given       Preoperative cardiovascular examination        Stage 4 chronic kidney disease  Cr 1.4-1.7 recently   Appears to be close to baseline   Monitor BMP; renally dose meds   Avoid nephrotoxic agents     Anemia associated with stage 4 chronic renal failure  - monitor CBC   - transfuse for Hgb < 8 or if pt symptomatic     Chronic combined systolic and diastolic congestive heart failure  Patient is identified as having Combined Systolic and Diastolic heart failure that is Chronic. CHF is currently controlled and uncontrolled due to Continued edema of extremities. Latest ECHO performed and demonstrates- Results for orders placed during the hospital encounter of 05/10/23    Echo    Interpretation Summary  · Eccentric hypertrophy and low normal systolic function.  · Moderate left atrial enlargement.  · The estimated ejection fraction is 50%.  · Indeterminate left ventricular diastolic function.  · There is abnormal septal wall  motion consistent with left bundle branch block.  · Normal right ventricular size with normal right ventricular systolic function.  · Mild-to-moderate mitral regurgitation.  · Mild to moderate tricuspid regurgitation.  - hold home diuretic, ARB while NPO    -  Give IV lasix 20 mg x 1 for BLE edema   - strict Is and Os   - Judicious IV fluids while NPO     Will dc ivf as pt is started on CLD    Atrial fibrillation with chronic anticoagulation with Eliquis  Patient with Permanent atrial fibrillation which is controlled currently with no medication. Patient is currently in sinus rhythm.[id number] Score: 4.. Anticoagulation indicated. Anticoagulation done with Eliquis.  - Eliquis on hold given surgical intervention  - Patient is currently on Heparin        S/P CABG x 3  - Patient on Eliquis - last dose 6/21 PM  - Hold Eliquis until cleared from a surgical standpoint to do so - currently on Heparin  - Cardiology consulted for preop eval       Hyperlipemia  - resume statin on discharge      Hypertension  - hold PO meds while NGT in place   - monitor BP   - IV hydralazine as needed         VTE Risk Mitigation (From admission, onward)         Ordered     heparin (porcine) injection 5,000 Units  Every 8 hours         06/24/23 1409     IP VTE HIGH RISK PATIENT  Once         06/22/23 1405     Place sequential compression device  Until discontinued         06/22/23 1405                Discharge Planning   LITTLE:      Code Status: Full Code   Is the patient medically ready for discharge?: No    Reason for patient still in hospital (select all that apply): Patient trending condition  Discharge Plan A: Home Health, Home with family                  Evert Barnes MD  Department of Hospital Medicine   [facility] - Tele[facility] ([facility])

## 2023-06-28 NOTE — PT/OT/SLP PROGRESS
Physical Therapy Treatment    Patient Name:  Jake Ortiz   MRN:  [id number]    Recommendations:     Discharge Recommendations: home health PT  Discharge Equipment Recommendations: none  Barriers to discharge: None    Assessment:     Jake Ortiz is a 83 y.o. male admitted with a medical diagnosis of Perforated appendicitis.  He presents with the following impairments/functional limitations: weakness, impaired endurance, impaired functional mobility, gait instability, impaired balance, decreased safety awareness, decreased coordination.    Rehab Prognosis: Good; patient would benefit from acute skilled PT services to address these deficits and reach maximum level of function.    Recent Surgery: Procedure(s) (LRB):  LAPAROSCOPY, DIAGNOSTIC (N/A)  LAPAROTOMY, EXPLORATORY (N/A)  HEMICOLECTOMY, RIGHT (N/A)  BIOPSY, ABDOMINAL WALL (N/A)  BLOCK, TRANSVERSUS ABDOMINIS PLANE (N/A) 6 Days Post-Op    Plan:     During this hospitalization, patient to be seen 3 x/week to address the identified rehab impairments via gait training, therapeutic activities, therapeutic exercises and progress toward the following goals:    Plan of Care Expires:  07/08/23    Subjective     Chief Complaint: NONE, EAGER TO WALK  Patient/Family Comments/goals:   Pain/Comfort:  Pain Rating 1: 0/10      Objective:     Communicated with NURSE CASTELLANOS prior to session.  Patient found supine with telemetry, peripheral IV, NANCY drain upon PT entry to room.     General Precautions: Standard, fall  Orthopedic Precautions: N/A  Braces: N/A  Respiratory Status: Room air     Functional Mobility:  Bed Mobility:     Scooting: stand by assistance  Transfers:     Sit to Stand:  stand by assistance with rolling walker  Gait: PT ' WITH RW SPV, NO LOB OR SOB ON ROOM AIR  Balance: GOOD SITTING AND STANDING BALANCE    AM-PAC 6 CLICK MOBILITY  Turning over in bed (including adjusting bedclothes, sheets and blankets)?: 3  Sitting down on and standing up from a chair  "with arms (e.g., wheelchair, bedside commode, etc.): 4  Moving from lying on back to sitting on the side of the bed?: 3  Moving to and from a bed to a chair (including a wheelchair)?: 4  Need to walk in hospital room?: 4  Climbing 3-5 steps with a railing?: 1  Basic Mobility Total Score: 19     Treatment & Education:  PT EDUCATION:  - ROLE OF P.T. AND POC IN ACUTE CARE HOSPITAL SETTING  - RW USE AND SAFETY DURING TF'S AND GAIT  - ENCOURAGED TO INCREASE TIME OOB IN CHAIR TO TOLERANCE   - TO CONTINUE THERAPUETIC EXERCISES THROUGHOUT THE DAY TO INCREASE ACTIVITY TOLERANCE AND DECREASE RISK FOR PNEUMONIA AND BLOOD CLOTS: HIP FLEX/EXT, HIP ABD/ADD, QUAD SET, HEEL SLIDE, AP  - RISK FOR FALLS DUE TO GENERALIZED WEAKNESS, EDUCATED ON "CALL DON'T FALL", ENCOURAGED TO CALL FOR ASSISTANCE WITH ALL NEEDS SUCH AS BED<>CHAIR TRANSFERS OR TRIPS TO BATHROOM, PT AGREEABLE TO SAFETY PRECAUTIONS    Patient left up in chair with all lines intact, call button in reach, NURSE notified, and WIFE present..    GOALS:   Multidisciplinary Problems       Physical Therapy Goals          Problem: Physical Therapy    Goal Priority Disciplines Outcome Goal Variances Interventions   Physical Therapy Goal     PT, PT/OT Ongoing, Progressing     Description: LTG to be met by 07/08/23:    Bed mob: SPV  Transfers: SPV with RW  Gait: SPV with RW x100 feet                       Time Tracking:     PT Received On: 06/28/23  PT Start Time: 1010     PT Stop Time: 1035  PT Total Time (min): 25 min     Billable Minutes: Gait Training 10 and Therapeutic Activity 15    Treatment Type: Treatment  PT/PTA: PT     Number of PTA visits since last PT visit: 0     06/28/2023  "

## 2023-06-28 NOTE — SUBJECTIVE & OBJECTIVE
Interval History: Had some n/v last night after overdoing CLD. Feeling better this morning without n/v. Ambulating halls. Having flatus/BM. Denies abdominal pain.    Medications:  Continuous Infusions:  Scheduled Meds:   fluticasone propionate  2 spray Each Nostril Daily    heparin (porcine)  5,000 Units Subcutaneous Q8H    miconazole NITRATE 2 %   Topical (Top) BID    mupirocin   Nasal BID    pantoprazole  40 mg Intravenous Daily    piperacillin-tazobactam (Zosyn) IV (PEDS and ADULTS) (extended infusion is not appropriate)  4.5 g Intravenous Q8H     PRN Meds:acetaminophen, hydrALAZINE, melatonin, morphine, naloxone, prochlorperazine, sodium chloride 0.9%     Review of patient's allergies indicates:  No Known Allergies  Objective:     Vital Signs (Most Recent):  Temp: 97.4 °F (36.3 °C) (06/28/23 0729)  Pulse: 61 (06/28/23 0729)  Resp: 18 (06/28/23 0729)  BP: (!) 162/71 (06/28/23 0729)  SpO2: 99 % (06/28/23 0729) Vital Signs (24h Range):  Temp:  [97.4 °F (36.3 °C)-98.7 °F (37.1 °C)] 97.4 °F (36.3 °C)  Pulse:  [61-74] 61  Resp:  [17-20] 18  SpO2:  [96 %-100 %] 99 %  BP: (122-166)/(62-81) 162/71     Weight: 91.6 kg (201 lb 15.1 oz)  Body mass index is 28.98 kg/m².    Intake/Output - Last 3 Shifts         06/26 0700 06/27 0659 06/27 0700 06/28 0659 06/28 0700 06/29 0659    I.V. (mL/kg) 1748.9 (19.1)      IV Piggyback 1152.3 343.7     Total Intake(mL/kg) 2901.1 (31.7) 343.7 (3.8)     Urine (mL/kg/hr)       Drains 60      Total Output 60      Net +2841.1 +343.7                     Physical Exam  Vitals and nursing note reviewed.   Constitutional:       General: He is not in acute distress.     Appearance: Normal appearance. He is not toxic-appearing.   HENT:      Head: Normocephalic.      Right Ear: External ear normal.      Left Ear: External ear normal.      Nose: Nose normal.   Eyes:      Conjunctiva/sclera: Conjunctivae normal.   Cardiovascular:      Rate and Rhythm: Normal rate.   Pulmonary:      Effort: Pulmonary  effort is normal. No respiratory distress.   Abdominal:      Comments: Soft, no distention, appropriate TTP, NANCY with serous drainage, midline incision with staples intact clean and dry   Skin:     General: Skin is warm and dry.   Neurological:      Mental Status: He is alert and oriented to person, place, and time.   Psychiatric:         Mood and Affect: Mood normal.         Behavior: Behavior normal.        Significant Labs:  I have reviewed all pertinent lab results within the past 24 hours.  CBC:   Recent Labs   Lab 06/27/23  0451   WBC 9.81   RBC 3.17*   HGB 9.0*   HCT 28.7*      MCV 91   MCH 28.4   MCHC 31.4*     BMP:   Recent Labs   Lab 06/28/23  0334   *      K 3.4*   *   CO2 18*   BUN 24*   CREATININE 1.3   CALCIUM 8.4*   MG 1.9       Significant Diagnostics:  I have reviewed all pertinent imaging results/findings within the past 24 hours.

## 2023-06-28 NOTE — ASSESSMENT & PLAN NOTE
- Patient on Eliquis - last dose 6/21 PM  - Hold Eliquis until cleared from a surgical standpoint to do so - currently on Heparin  - Cardiology consulted for preop eval

## 2023-06-28 NOTE — PLAN OF CARE
[facility] Tele[facility] (Hospital)  Discharge Reassessment    Primary Care Provider: Byron Stevens MD    Expected Discharge Date:     Reassessment (most recent)       Discharge Reassessment - 06/28/23 1551          Discharge Reassessment    Assessment Type Discharge Planning Reassessment     Did the patient's condition or plan change since previous assessment? No     Discharge Plan discussed with: Patient;Spouse/sig other     Communicated LTITLE with patient/caregiver Date not available/Unable to determine     Discharge Plan A Home;Home Health     DME Needed Upon Discharge  none     Transition of Care Barriers None     Why the patient remains in the hospital Requires continued medical care        Post-Acute Status    Discharge Delays None known at this time                   Patient remains hospitalized d/t continued medical care. General surgery and cardiology following pt's care. PT/OT recommending HH. Pt established with [facility] prior to hospital admission. HH orders needed prior to discharge.    SW to remain available for discharge planning needs.

## 2023-06-28 NOTE — ASSESSMENT & PLAN NOTE
-Clinically compensated  -Diurese prn  -Resume home regimen as tolerated  -Repeat echo pending given episode of VT overnight

## 2023-06-28 NOTE — PLAN OF CARE
Problem: Adult Inpatient Plan of Care  Goal: Plan of Care Review  Outcome: Ongoing, Progressing     Problem: Diabetes Comorbidity  Goal: Blood Glucose Level Within Targeted Range  Outcome: Ongoing, Progressing  Intervention: Monitor and Manage Glycemia  Flowsheets (Taken 6/28/2023 0415)  Glycemic Management: blood glucose monitored     Problem: Fall Injury Risk  Goal: Absence of Fall and Fall-Related Injury  Outcome: Ongoing, Progressing  Intervention: Identify and Manage Contributors  Flowsheets (Taken 6/28/2023 5846)  Medication Review/Management: medications reviewed  Intervention: Promote Injury-Free Environment  Flowsheets (Taken 6/28/2023 0416)  Safety Promotion/Fall Prevention:   assistive device/personal item within reach   nonskid shoes/socks when out of bed   side rails raised x 2   medications reviewed

## 2023-06-28 NOTE — SUBJECTIVE & OBJECTIVE
Interval History: Patient sitting up in chair without acute distress.  Wife at bedside states he had an episode last night that resulted in him slumping over in the chair and passed out for 10-15 seconds.  Telemonitoring noted Vtach.  Cardiology consulted.    Review of Systems  Objective:     Vital Signs (Most Recent):  Temp: 97.4 °F (36.3 °C) (06/28/23 1219)  Pulse: 65 (06/28/23 1219)  Resp: 18 (06/28/23 1219)  BP: (!) 146/74 (06/28/23 1219)  SpO2: 98 % (06/28/23 1219) Vital Signs (24h Range):  Temp:  [97.4 °F (36.3 °C)-98.7 °F (37.1 °C)] 97.4 °F (36.3 °C)  Pulse:  [61-74] 65  Resp:  [17-20] 18  SpO2:  [96 %-99 %] 98 %  BP: (122-166)/(62-81) 146/74     Weight: 91.6 kg (201 lb 15.1 oz)  Body mass index is 28.98 kg/m².    Intake/Output Summary (Last 24 hours) at 6/28/2023 1339  Last data filed at 6/28/2023 1331  Gross per 24 hour   Intake 583.71 ml   Output 100 ml   Net 483.71 ml         Physical Exam  Vitals and nursing note reviewed.   Constitutional:       Appearance: Normal appearance.   HENT:      Head: Normocephalic and atraumatic.      Nose: Nose normal.      Mouth/Throat:      Mouth: Mucous membranes are moist.   Eyes:      Extraocular Movements: Extraocular movements intact.      Conjunctiva/sclera: Conjunctivae normal.   Cardiovascular:      Rate and Rhythm: Normal rate and regular rhythm.      Pulses: Normal pulses.      Heart sounds: Normal heart sounds.   Pulmonary:      Effort: Pulmonary effort is normal.      Breath sounds: Normal breath sounds.   Abdominal:      General: Abdomen is flat. There is no distension.      Palpations: Abdomen is soft.      Comments: NANCY with serous drainage, midline incision with staples intact clean and dry, decrease bowel sounds   Musculoskeletal:         General: Normal range of motion.      Cervical back: Normal range of motion and neck supple.   Skin:     General: Skin is warm.      Capillary Refill: Capillary refill takes less than 2 seconds.   Neurological:      Mental  Status: He is alert and oriented to person, place, and time. Mental status is at baseline.   Psychiatric:         Mood and Affect: Mood normal.         Behavior: Behavior normal.           Significant Labs: All pertinent labs within the past 24 hours have been reviewed.  Recent Lab Results         06/28/23  0334        Albumin 2.2       Alkaline Phosphatase 74       ALT 24       Anion Gap 14       AST 35       BILIRUBIN TOTAL 0.5  Comment: For infants and newborns, interpretation of results should be based  on gestational age, weight and in agreement with clinical  observations.    Premature Infant recommended reference ranges:  Up to 24 hours.............<8.0 mg/dL  Up to 48 hours............<12.0 mg/dL  3-5 days..................<15.0 mg/dL  6-29 days.................<15.0 mg/dL         BUN 24       Calcium 8.4       Chloride 111       CO2 18       Creatinine 1.3       eGFR 55       Glucose 123       Magnesium 1.9       Phosphorus 1.9       Potassium 3.4       PROTEIN TOTAL 6.4       Sodium 143       Troponin I 0.057  Comment: The reference interval for Troponin I represents the 99th percentile   cutoff   for our facility and is consistent with 3rd generation assay   performance.                 Significant Imaging:   CT Abdomen Pelvis  Without Contrast   Final Result      Unchanged air fluid collection adjacent to the cecum.  No oral contrast is seen within this collection.      Increasing airspace opacities within the lower lobes bilaterally suggesting atelectasis or pneumonia.      Additional findings are unchanged.         Electronically signed by: Eliot Callahan   Date:    06/22/2023   Time:    15:04      CT Renal Stone Study ABD Pelvis WO   Final Result      Extraluminal air-fluid collection in the expected location of the appendix suspicious for appendicitis with perforation and abscess formation.  There is developing mild to moderate perihepatic ascites.      Submucosal fat deposition within the colon  as discussed which appears similar to the prior study.  This is nonspecific.      Absent right kidney.      Cholelithiasis.      Developing small right pleural effusion with unchanged cardiomegaly.      Report was discussed with Dr. Lacey at 09:35.         Electronically signed by: Eliot Callahan   Date:    06/22/2023   Time:    09:38

## 2023-06-28 NOTE — ASSESSMENT & PLAN NOTE
Patient with Permanent atrial fibrillation which is controlled currently with no medication. Patient is currently in sinus rhythm.[id number] Score: 4.. Anticoagulation indicated. Anticoagulation done with Eliquis.  - Eliquis on hold given surgical intervention  - Patient is currently on Heparin

## 2023-06-29 NOTE — NURSING
Pt is AAOx4,Pt on room air, V-paced  on telemetry monitor. Pt is on a clear liquid diet,tolerating well. Meplix applied to sacal area(redness blanchable to sacrum), pt instructed to turn every 2 hours.  POC reviewed with pt at bedside. Pt turns independently  in bed.  NANCY drain in rt abdominal quadrant with total output of 150ml on this shift. Pt has pitting edema bilaterally in lower extremities. Bed is locked in lowest position, side rails up x2, bed alarm set, call light/personal items within reach, pt instructed to call staff for assistance with

## 2023-06-29 NOTE — SIGNIFICANT EVENT
DEATH NOTE    Code Blue called overhead.  initial rhythm vfib, received epi + bicarb + amiodarone 300 & 150 mg, 3 shocks, converted to junctional PEA followed by ROSC    Shortly after moving to ICU bed 7, patient lost pulse again.  Discussed with wife, aware of poor prognosis. Did not want him to suffer anymore. Code called off.     Patient is without any visible respiratory movements, pupils dilated and fixed, no heart sounds heard, no carotid or radial pulses palpable. The Patient is pronounced dead at 5:38 AM on June 29, 2023    Discharge summary to be done by Dr. Evert Barnes.    - Tavo Brown MD

## 2023-06-29 NOTE — PROVIDER PROGRESS NOTES - EMERGENCY DEPT.
SCRIBE #1 NOTE: Loraine TALLEY am scribing for, and in the presence of, Olman Brar MD. I have scribed the entire note.        Called to bedside for a Code Blue. Patient was found pulseless with initial rhythm coarse V-fib.     CPR performed:  Rhythm progression: V-fib, followed by PEA, followed by ROSC with junctional tachycardia 104 BPM, confirmed with cardiac activity on US, regular with very weak ejection fraction.  Medications given: Epinephrine, Amiodarone 300 mg, Amio 150 mg, Bicarb x2. Shocks given x3.     Verbal orders placed for 12-lead EKG, levophed gtt.     Patient handed off to ICU.     Critical Care    Date/Time: 6/29/2023 5:28 AM  Performed by: Olman Brar MD  Authorized by: Olman Brar MD   Direct patient critical care time: 8 minutes  Additional history critical care time: 5 minutes  Ordering / reviewing critical care time: 3 minutes  Documentation critical care time: 6 minutes  Consulting other physicians critical care time: 8 minutes  Total critical care time (exclusive of procedural time) : 30 minutes  Critical care time was exclusive of separately billable procedures and treating other patients and teaching time.  Critical care was necessary to treat or prevent imminent or life-threatening deterioration of the following conditions: cardiac failure (cardiac arrest).  Critical care was time spent personally by me on the following activities: discussions with consultants, examination of patient, interpretation of cardiac output measurements, evaluation of patient's response to treatment, ordering and performing treatments and interventions, obtaining history from patient or surrogate, pulse oximetry, re-evaluation of patient's condition and review of old charts.      Intubation    Date/Time: 6/29/2023 5:29 AM  Location procedure was performed: [facility] EMERGENCY DEPARTMENT  Performed by: Olman Brar MD  Authorized by: Olman Brar MD   Consent Done: Emergent  Situation  Indications: respiratory distress and respiratory failure  Intubation method: direct  Patient status: unconscious  Preoxygenation: BVM  Paralytic: none  Laryngoscope size: Mac 4  Tube size: 8.0 mm  Tube type: cuffed  Number of attempts: 1  Ventilation between attempts: BVM  Post-procedure assessment: chest rise and CO2 detector  Breath sounds: rales/crackles  Cuff inflated: yes  ETT to lip: 22 cm  Tube secured with: ETT reyes  Patient tolerance: Patient tolerated the procedure well with no immediate complications  Complications: No  Specimens: No  Implants: No  Comments: Performed by RT Samantha under my direct supervision.         Scribe Attestation:   Scribe #1: I performed the above scribed service and the documentation accurately describes the services I performed. I attest to the accuracy of the note.     Attending:   Physician Attestation Statement for Scribe #1: I, Olman Brar MD, personally performed the services described in this documentation, as scribed by Loraine Robertson, in my presence, and it is both accurate and complete. As with other dictation methods such as dictation software, small errors or inconsistencies may be overlooked due to the goal of spending more face-to-face time with patients.

## 2023-06-29 NOTE — NURSING
Notified of pt reading asystole on telemetry approximately at 0448. Code blue called and CPR started immediately.

## 2023-06-29 NOTE — DISCHARGE SUMMARY
[facility] - Intensive Care ([facility])  [facility]  Discharge Summary      Patient Name: Jake Ortiz  MRN: [id number]  ELSIE: [id number]  Patient Class: IP- Inpatient  Admission Date: 6/22/2023  Hospital Length of Stay: 6 days  Discharge Date and Time:      Attending Physician: Bobby Garcia MD   Discharging Provider: Bobby Garcia MD  Primary Care Provider: Byron Stevens MD    Primary Care Team: [facility] MEDICINE C    HPI:   Mr. Ortiz is a 82 yo male with hx of prostate cancer (not on active treatment), Afib on Eliquis, CHF, and CAD s/p CABG x 3 presented with abdominal pain that has gotten progressively worse over the last 6 weeks, but it was significantly worse this morning and therefore the wife brought him into the hospital.  On CT abdomen patient was found to have a perforated appendix and general surgery was consulted.  Dr. Moody recommended IR consulted for IR drain placement. Dr. Landin recommended CT with PO/IV contrast but due to renal function IV contrast cannot be given.  Patient is NPO and continues to have abdominal tenderness and guarding. Hospital medicine will admit to inpatient for perforated appendix, follow up on IR and GS recommendaitons and IV antibiotics.          Procedure(s) (LRB):  LAPAROSCOPY, DIAGNOSTIC (N/A)  LAPAROTOMY, EXPLORATORY (N/A)  HEMICOLECTOMY, RIGHT (N/A)  BIOPSY, ABDOMINAL WALL (N/A)  BLOCK, TRANSVERSUS ABDOMINIS PLANE (N/A)      Hospital Course:   Gen Surg evaluated and due to peritonitis, opted to take patient to diagnostic laparoscopy on 06/22 which was then converted to an ex-lap. Pt was found to have walled off perforation in R abdomen near appendix/cecum with intraabdominal purulence, underwent R hemicolectomy. Postop, NGT remains in place, awaiting return of bowel function.     6/26: cont npo per surgery. Fluids changed to d5w 1/2ns. Cont zosyn.     6/27: CLD started by surgery. Cont zosyn.     6/28: Patient noted to vomiting this morning about 10 hours after  an italian icee.  Surgery recommended continuing CLD.  Patient had a run of VTach last night and passed out.  Cardiology consulted.    : Code blue called this AM, Patient with asystole noted 0448, code blue was called, CPR started. Patient recieved epi, bicarb, amiodarone 300 and 150 mg, 3 shocks. Initial rhtyhem v-fib, converted to junctional PEA followed by ROSC. Patient transferred to ICU, lost pusle again. Wife aware of poor prognosis, decision made to stop further CPR. Patient  2023, pronounced dead 5:38 AM.       Goals of Care Treatment Preferences:  Code Status: Full Code      Consults:   Consults (From admission, onward)        Status Ordering Provider     Inpatient consult to General Surgery  Once        Provider:  Victor Hugo Moody MD    Completed AMPARO MONTILLA     Inpatient consult to Interventional Radiology  Once        Provider:  Corwin Landin MD    Completed AMPARO MONTILLA     Inpatient consult to Cardiology  Once        Provider:  Ned Egan MD    Completed AMPARO MONTILLA          No new Assessment & Plan notes have been filed under this hospital service since the last note was generated.  Service: Hospital Medicine    Final Active Diagnoses:    Diagnosis Date Noted POA    PRINCIPAL PROBLEM:  Perforated appendicitis [K35.32] 2023 Yes    Chronic combined systolic and diastolic congestive heart failure [I50.42] 2018 Yes    Atrial fibrillation with chronic anticoagulation with Eliquis [I48.91] 10/19/2017 Yes    Hypertension [I10]  Yes    Hyperlipemia [E78.5]  Yes    V-tach [I47.20] 2023 No    Hypokalemia [E87.6] 2023 Yes    Preoperative cardiovascular examination [Z01.810] 2021 Not Applicable    Anemia associated with stage 4 chronic renal failure [N18.4, D63.1] 2020 Yes    Stage 4 chronic kidney disease [N18.4] 2020 Yes    S/P CABG x 3 [Z95.1] 2014 Not Applicable      Problems Resolved During this Admission:        Discharged Condition:     Disposition:     Follow Up:    Patient Instructions:   No discharge procedures on file.    Significant Diagnostic Studies: Labs: All labs within the past 24 hours have been reviewed    Pending Diagnostic Studies:     Procedure Component Value Units Date/Time    Specimen to Pathology, Surgery General Surgery [[id number]] Collected: 23    Order Status: Sent Lab Status: In process Updated: 23 111    Specimen: Tissue          Medications:  None    Indwelling Lines/Drains at time of discharge:   Lines/Drains/Airways     Drain  Duration                Closed/Suction Drain 23 2155 Superior;Midline Abdomen Bulb 15 Fr. 6 days          Airway  Duration                Airway - Non-Surgical 23 0500 Endotracheal Tube <1 day                Time spent on the discharge of patient: 20 minutes      Bobby Garcia MD  Department of Hospital Medicine  [facility] - Intensive Care ([facility])

## 2023-06-29 NOTE — PLAN OF CARE
[facility] - Intensive Care (Hospital)  Discharge Final Note    Primary Care Provider: Byron Stevens MD    Expected Discharge Date:     Final Discharge Note (most recent)       Final Note - 23 0806          Final Note    Assessment Type Final Discharge Note     Anticipated Discharge Disposition                    Pt      Important Message from Medicare  Important Message from Medicare regarding Discharge Appeal Rights: Given to patient/caregiver, Explained to patient/caregiver, Signed/date by patient/caregiver     Date IMM was signed: 23  Time IMM was signed: 1520

## 2023-06-29 NOTE — PROGRESS NOTES
Code blue called on patient. CPR in progress on arrival. Intubated patient using #4 MAC, ETT 8.0 secure at 24 cm at the lips. ZOL in place, ROSC achieved EtCO2 34. Patient transferred to ICU.

## 2023-06-29 NOTE — DISCHARGE SUMMARY
Discharge summary/Death Note:    HPI:  Mr. Ortiz is a 82 yo male with hx of prostate cancer (not on active treatment), Afib on Eliquis, CHF, and CAD s/p CABG x 3 presented with abdominal pain that has gotten progressively worse over the last 6 weeks, but it was significantly worse this morning and therefore the wife brought him into the hospital.  On CT abdomen patient was found to have a perforated appendix and general surgery was consulted.  Dr. Moody recommended IR consulted for IR drain placement. Dr. Landin recommended CT with PO/IV contrast but due to renal function IV contrast cannot be given.  Patient is NPO and continues to have abdominal tenderness and guarding. Hospital medicine will admit to inpatient for perforated appendix, follow up on IR and GS recommendaitons and IV antibiotics.    Overview/Hospital Course:  Gen Surg evaluated and due to peritonitis, opted to take patient to diagnostic laparoscopy on 06/22 which was then converted to an ex-lap. Pt was found to have walled off perforation in R abdomen near appendix/cecum with intraabdominal purulence, underwent R hemicolectomy. Postop, NGT remains in place, awaiting return of bowel function.   6/26: cont npo per surgery. Fluids changed to d5w 1/2ns. Cont zosyn.   6/27: CLD started by surgery. Cont zosyn.   6/28: Patient noted to vomiting this morning about 10 hours after an italian icee.  Surgery recommended continuing CLD.  Patient had a run of VTach last night and passed out.  Cardiology consulted.  6/29: Patient coded and passed at 5:38AM as noted by Dr. Brown      Patient was last seen by me yesterday morning sitting up in the chair with with at bedside requesting food, but denying any pain.  He did have a bout of Vtach the night prior for which Dr. Garcia was consulted and patient was started on metoprolol.  A repeat Echo was done and EF was stable.  Patient did have a significant cardiac history.  At the time of my visit and evaluation by  "cardiology patient denied any chest pain or shortness of breathe. Vitals were stable with mildly elevate BP of 146/74.      Per overnight note by Dr. Brown a code blue was called early this morning and patient was found to be in Vfib, resuscitation was underway but due to futile events patients wife did not want him to suffer anymore and the code was called off.  He was pronounced at 5:38AM on 6/29/23.    I was NOT present for the code, but the cause of death was likely cardiac arrest based events noted in the significant event note by Dr. Brown.      Of note, the "note time" is 5:38AM, but I was not aware of this event until now and therefore the note was created at 9:59AM although I cannot manually change the time of the note.  "

## 2023-06-29 NOTE — PROGRESS NOTES
Pt arrived to ICU 7. Place on vent AC/VC 22/550/+5/100%. Code franklin called, began CPR, pt taken off of vent and bagged with AMBU. ZOL in place. EtCO2 46 during CPR. ROSC not achieved. ETT still in place.

## 2023-07-03 LAB
FINAL PATHOLOGIC DIAGNOSIS: NORMAL
GROSS: NORMAL
Lab: NORMAL
MICROSCOPIC EXAM: NORMAL

## 2023-07-03 NOTE — SUBJECTIVE & OBJECTIVE
Interval History: No issues overnight. Having bowel movements.     Review of Systems   Constitutional:  Negative for fatigue and fever.   HENT:  Negative for sinus pressure.    Eyes:  Negative for visual disturbance.   Respiratory:  Negative for shortness of breath.    Cardiovascular:  Negative for chest pain.   Gastrointestinal:  Positive for abdominal pain (improving). Negative for nausea and vomiting.   Genitourinary:  Negative for difficulty urinating.   Musculoskeletal:  Negative for back pain.   Skin:  Negative for rash.   Neurological:  Negative for headaches.   Psychiatric/Behavioral:  Negative for confusion.    Objective:     Vital Signs (Most Recent):  Temp: 98 °F (36.7 °C) (06/27/23 1114)  Pulse: 72 (06/27/23 1114)  Resp: 18 (06/27/23 1114)  BP: (!) 147/77 (06/27/23 1114)  SpO2: 100 % (06/27/23 1114) Vital Signs (24h Range):  Temp:  [97.1 °F (36.2 °C)-98 °F (36.7 °C)] 98 °F (36.7 °C)  Pulse:  [58-72] 72  Resp:  [16-18] 18  SpO2:  [95 %-100 %] 100 %  BP: (128-147)/(60-77) 147/77     Weight: 91.6 kg (201 lb 15.1 oz)  Body mass index is 28.98 kg/m².    Intake/Output Summary (Last 24 hours) at 6/27/2023 1203  Last data filed at 6/27/2023 0508  Gross per 24 hour   Intake 2901.14 ml   Output 60 ml   Net 2841.14 ml         Physical Exam  Constitutional:       General: He is not in acute distress.     Appearance: He is well-developed. He is not diaphoretic.   HENT:      Head: Normocephalic and atraumatic.   Eyes:      Pupils: Pupils are equal, round, and reactive to light.   Cardiovascular:      Rate and Rhythm: Normal rate and regular rhythm.      Heart sounds: Normal heart sounds. No murmur heard.    No friction rub. No gallop.   Pulmonary:      Effort: Pulmonary effort is normal. No respiratory distress.      Breath sounds: Normal breath sounds. No stridor. No wheezing or rales.   Abdominal:      General: There is no distension.      Palpations: Abdomen is soft. There is no mass.      Tenderness: There is  Per note by Ilda Denis NP, message has been sent to  to coordinate appointment with GI for EUS outpatient.     abdominal tenderness (mild). There is no guarding.      Comments: Decreased bowel sounds     Musculoskeletal:      Right lower leg: No edema.      Left lower leg: No edema.   Skin:     General: Skin is warm.      Findings: No erythema.   Neurological:      Mental Status: He is alert and oriented to person, place, and time.           Significant Labs: All pertinent labs within the past 24 hours have been reviewed.    Significant Imaging: I have reviewed all pertinent imaging results/findings within the past 24 hours.

## 2024-01-18 NOTE — ASSESSMENT & PLAN NOTE
--SOB and KING  --hgb 5.8 on admit  --transfuse 2u pRBC  --repeat cbc in AM  --has had extensive workup with GI and heme/onc  --occult stool negative  --pt reports red stool - but not blood streaked, entire BM was red, he reports it could have been from his diet/strawberry ice cream  --hold eliquis  --GI & Heme/onc consulted     - - -

## 2024-09-30 NOTE — DISCHARGE SUMMARY
Discharge Note  Short Stay      SUMMARY     Admit Date: 3/23/2023    Attending Physician: Philipp Demarco MD        Discharge Physician: Philipp Demarco MD        Discharge Date: 3/23/2023 1:57 PM    Procedure(s) (LRB):  INSERTION, NEUROSTIMULATOR, SPINAL (N/A)    Final Diagnosis: Lumbar radiculopathy [M54.16]  Postlaminectomy syndrome [M96.1]  Chronic pain syndrome [G89.4]    Disposition: Home or self care    Patient Instructions:   Current Discharge Medication List        START taking these medications    Details   cephALEXin (KEFLEX) 500 MG capsule Take 1 capsule (500 mg total) by mouth every 8 (eight) hours. for 3 days  Qty: 9 capsule, Refills: 0      doxycycline (VIBRAMYCIN) 100 MG Cap Take 1 capsule (100 mg total) by mouth every 12 (twelve) hours. for 3 days  Qty: 6 capsule, Refills: 0      oxyCODONE-acetaminophen (PERCOCET) 5-325 mg per tablet Take 1 tablet by mouth every 6 (six) hours as needed for Pain.  Qty: 21 tablet, Refills: 0    Comments: Quantity prescribed more than 7 day supply? No           CONTINUE these medications which have NOT CHANGED    Details   acetaminophen (TYLENOL) 500 MG tablet Take 2 tablets (1,000 mg total) by mouth every 6 (six) hours as needed for Pain.  Refills: 0      apixaban (ELIQUIS) 2.5 mg Tab Take 1 tablet (2.5 mg total) by mouth 2 (two) times daily.  Qty: 180 tablet, Refills: 1    Associated Diagnoses: PAF (paroxysmal atrial fibrillation)      atorvastatin (LIPITOR) 80 MG tablet Take 1 tablet (80 mg total) by mouth every evening.  Qty: 90 tablet, Refills: 1      clonazePAM (KLONOPIN) 1 MG tablet Take 1 tablet (1 mg total) by mouth nightly as needed for Anxiety.  Qty: 90 tablet, Refills: 0    Associated Diagnoses: Periodic limb movement disorder (PLMD)      fluticasone propionate (FLONASE) 50 mcg/actuation nasal spray USE 2 SPRAYS IN EACH NOSTRIL EVERY DAY  Qty: 48 g, Refills: 5    Associated Diagnoses: Allergic rhinitis, unspecified seasonality, unspecified trigger       furosemide (LASIX) 40 MG tablet Take 1 tablet (40 mg total) by mouth 2 (two) times daily. Can double to 2 tablets twice a day for 3 days for more swelling/fluid build up - take 80mg twice a day  Qty: 90 tablet, Refills: 1    Associated Diagnoses: Paroxysmal atrial fibrillation; Essential hypertension      krill/om-3/dha/epa/phospho/ast (MEGARED OMEGA-3 KRILL OIL ORAL) Take 1 capsule by mouth every morning.      levocetirizine (XYZAL) 5 MG tablet Take 1 tablet (5 mg total) by mouth every evening.  Qty: 90 tablet, Refills: 1    Associated Diagnoses: Allergic rhinitis, unspecified seasonality, unspecified trigger      losartan (COZAAR) 50 MG tablet Take 1 tablet (50 mg total) by mouth once daily.  Qty: 90 tablet, Refills: 3    Comments: .  Associated Diagnoses: Essential hypertension; Permanent atrial fibrillation; Mixed hyperlipidemia; Coronary artery disease of native artery of native heart with stable angina pectoris      magnesium oxide (MAG-OX) 400 mg (241.3 mg magnesium) tablet Take 1 tablet (400 mg total) by mouth once daily.  Qty: 90 tablet, Refills: 1    Associated Diagnoses: Hypomagnesemia      multivitamin capsule Take 1 capsule by mouth once daily.      pantoprazole (PROTONIX) 40 MG tablet Take 1 tablet (40 mg total) by mouth once daily.  Qty: 90 tablet, Refills: 3      potassium chloride SA (K-DUR,KLOR-CON) 20 MEQ tablet Take 1 tablet (20 mEq total) by mouth once daily.  Qty: 90 tablet, Refills: 1      pregabalin (LYRICA) 50 MG capsule Take 1 capsule (50 mg total) by mouth once daily.  Qty: 30 capsule, Refills: 0      pyridoxine, vitamin B6, (B-6) 100 MG Tab Take 50 mg by mouth once daily.      spironolactone (ALDACTONE) 25 MG tablet Take 1 tablet (25 mg total) by mouth once daily.  Qty: 90 tablet, Refills: 3    Comments: .      vit A/vit C/vit E/zinc/copper (OCUVITE PRESERVISION ORAL) Take 1 capsule by mouth every evening.      blood sugar diagnostic Strp Check blood glucose levels daily in the AM  fasting and 1-2 times more daily.  Qty: 300 strip, Refills: 3    Associated Diagnoses: Diabetes mellitus type 2 in obese      cefadroxil (DURICEF) 500 MG Cap Take 1 capsule (500 mg total) by mouth every 12 (twelve) hours.  Qty: 6 capsule, Refills: 0    Comments: Take 2 caps tonight @ 8 pm then 1 caps every 12 hrs until done.      cholecalciferol, vitamin D3, (VITAMIN D3) 25 mcg (1,000 unit) capsule Take 1,000 Units by mouth once daily.      lancets Misc Check blood glucose levels daily in the AM fasting and 1-2 times more daily.  Qty: 300 each, Refills: 3    Associated Diagnoses: Diabetes mellitus type 2 in obese                 Discharge Diagnosis: Lumbar radiculopathy [M54.16]  Postlaminectomy syndrome [M96.1]  Chronic pain syndrome [G89.4]  Condition on Discharge: Stable with no complications to procedure   Diet on Discharge: Same as before.  Activity: as per instruction sheet.  Discharge to: Home with a responsible adult.  Follow up: 2-4 weeks       Please call the office at [phone] if you experience any weakness or loss of sensation, fever > 101.5, pain uncontrolled with oral medications, persistent nausea/vomiting/or diarrhea, redness or drainage from the incisions, or any other worrisome concerns. If physician on call was not reached or could not communicate with our office for any reason please go to the nearest emergency department         Opt out

## 2024-10-02 NOTE — PROGRESS NOTES
See ABG   [No Acute Distress] : no acute distress [Normal TMs] : both tympanic membranes were normal [Supple] : supple [Clear to Auscultation] : lungs were clear to auscultation bilaterally [Normal S1, S2] : normal S1 and S2 [Soft] : abdomen soft [Coordination Grossly Intact] : coordination grossly intact [Normal Gait] : normal gait [Speech Grossly Normal] : speech grossly normal [Normal Mood] : the mood was normal [Normal Appearance] : normal in appearance [No Masses] : no palpable masses [No Nipple Discharge] : no nipple discharge [No Axillary Lymphadenopathy] : no axillary lymphadenopathy [de-identified] : 0.5 cm growth on the right great toe along the nail bed

## 2024-11-06 NOTE — TELEPHONE ENCOUNTER
----- Message from Mae Sawant sent at 9/23/2020  8:50 AM CDT -----  Contact: Jake Joseph would like a call back at [phone], Regards to his hospital follow up and update.    Thanks  Td     Additional Safety/Bands:

## 2025-06-21 NOTE — ASSESSMENT & PLAN NOTE
A1c at 5.1 on 03/17/2022 / BG in ED at 112  Accuchecks with SSI prn   ADA, cardiac diet  4/29 Blood sugars running          No

## 2025-07-03 NOTE — DISCHARGE INSTRUCTIONS
ANTICOAGULATION CLINIC REFERRAL RENEWAL REQUEST       An annual renewal order is required for all patients referred to [facility].?  Please review and sign the pended referral order for Zach Garcia.       ANTICOAGULATION SUMMARY      Warfarin indication(s)   Atrial Fibrillation    Mechanical heart valve present?  NO       Current goal range   INR: 2.0-3.0     Goal appropriate for indication? Goal INR 2-3, standard for indication(s) above     Time in Therapeutic Range (TTR)  (Goal > 60%) 52.4%       Office visit with referring provider's group within last year yes on 3/5/25   Additional standing orders None       Mone Kent, PATRIC  [facility]   [facility]  [address] [facility]  [address]  [phone] phone     [phone] fax  Hours of Operation: Monday- Friday 8:00am- 5:00pm  After hours phone  [phone]  [facility] On[facility] Phys[facility] on call      Dr. Portillo Barbosa      Please call with any concerns regarding your appointment today.      FALL PREVENTION   Falls often occur due to slipping, tripping or losing your balance. Here are ways to reduce your risk of falling again.   Was there anything that caused your fall that can be fixed, removed or replaced?   Make your home safe by keeping walkways clear of objects you may trip over.   Use non-slip pads under rugs.   Do not walk in poorly lit areas.   Do not stand on chairs or wobbly ladders.   Use caution when reaching overhead or looking upward. This position can cause a loss of balance.   Be sure your shoes fit properly, have non-slip bottoms and are in good condition.   Be cautious when going up and down stairs, curbs, and when walking on uneven sidewalks.   If your balance is poor, consider using a cane or walker.   If your fall was related to alcohol use, stop or limit alcohol intake.   If your fall was related to use of sleeping medicines, talk to your doctor about this. You may need to reduce your dosage at bedtime if you awaken during the night to go to the bathroom.   To reduce the need for nighttime bathroom trips:   Avoid drinking fluids for several hours before going to bed   Empty your bladder before going to bed   Men can keep a urinal at the bedside   © [id number] [employer], [address] [place] [address], [place] [address]. All rights reserved. This information is not intended as a substitute for professional medical care. Always follow your healthcare professional's instructions.

## 2025-08-01 NOTE — TELEPHONE ENCOUNTER
Spoke to pt. Pt is seeing hemoc here at [facility]. Spoke to pt and verified  appt. Pt verbalized understanding    Detail Level: Detailed Quality 431: Preventive Care And Screening: Unhealthy Alcohol Use - Screening: Patient not identified as an unhealthy alcohol user when screened for unhealthy alcohol use using a systematic screening method Quality 226: Preventive Care And Screening: Tobacco Use: Screening And Cessation Intervention: Patient screened for tobacco use and is an ex/non-smoker

## (undated) DEVICE — BLADE PLASMA WIDE SPATULA TIP

## (undated) DEVICE — CANNULA ENDOPATH XCEL 5X100MM

## (undated) DEVICE — GLOVE SURGICAL LATEX SZ 6.5

## (undated) DEVICE — HFX IQ REMOTE

## (undated) DEVICE — CUTTER PROXIMATE BLUE 75MM

## (undated) DEVICE — GOWN POLY REINF BRTH SLV XL

## (undated) DEVICE — DEVICE ENSEAL X1 LARGE JAW

## (undated) DEVICE — ADHESIVE DERMABOND ADVANCED

## (undated) DEVICE — IPG TEMPLATE KIT

## (undated) DEVICE — DRESSING TELFA N ADH 3X8

## (undated) DEVICE — PACK PACER PERMANENT

## (undated) DEVICE — UNDERGLOVES BIOGEL PI SZ 6 LF

## (undated) DEVICE — PAD GROUNDING DISPER ELECTRODE

## (undated) DEVICE — RELOAD PROXIMATE CUT BLUE 75MM

## (undated) DEVICE — SYR 10CC LUER LOCK

## (undated) DEVICE — TRAY CATH FOL SIL URIMTR 16FR

## (undated) DEVICE — COVER LIGHT HANDLE 80/CA

## (undated) DEVICE — DRESSING TRANS 4X4 TEGADERM

## (undated) DEVICE — SUT SILK 3-0 STRANDS 30IN

## (undated) DEVICE — ELECTRODE REM PLYHSV RETURN 9

## (undated) DEVICE — ELECTRODE BLADE E-Z CLEAN 4IN

## (undated) DEVICE — NDL SAFETY 25G X 1.5 ECLIPSE

## (undated) DEVICE — TUBING MEDI-VAC 20FT .25IN

## (undated) DEVICE — DRESSING AQUACEL AG ADV 3.5X6

## (undated) DEVICE — NDL PERCUTANEOUS 21G 7CM VASC

## (undated) DEVICE — SUT VICRYL CTD 2-0 GI 27 SH

## (undated) DEVICE — IRRIGATOR ENDOSCOPY DISP.

## (undated) DEVICE — DRAPE SURG W/TWL 17 5/8X23

## (undated) DEVICE — Device

## (undated) DEVICE — SCALPEL SZ 10 RETRACTABLE

## (undated) DEVICE — SUT MCRYL PLUS 4-0 PS2 27IN

## (undated) DEVICE — BINDER ABDOMINAL 10IN 27-48IN

## (undated) DEVICE — EVACUATOR WOUND BULB 100CC

## (undated) DEVICE — PACK DRAPE PERI/GYN TIBURON

## (undated) DEVICE — ALCOHOL 70% ISOP RUBBING 4OZ

## (undated) DEVICE — SUT MONOCRYL 4-0 PS-1 UND

## (undated) DEVICE — KIT TUNNELING TOOL 35CM

## (undated) DEVICE — SUT MONOCRYL 4.0 PS2 CP496G

## (undated) DEVICE — DRAPE C-ARMOR EQUIPMENT COVER

## (undated) DEVICE — MANIFOLD 4 PORT

## (undated) DEVICE — APPLICATOR STRL COT 2INNR 6IN

## (undated) DEVICE — BINDER ABDOMINIAL 14X14-54

## (undated) DEVICE — APPLICATOR CHLORAPREP ORN 26ML

## (undated) DEVICE — DRAPE LAP T SHT W/ INSTR PAD

## (undated) DEVICE — SYR BULB 60CC IRRIGATION

## (undated) DEVICE — CONTAINER SPECIMEN OR STER 4OZ

## (undated) DEVICE — SUT VICRYL PLUS 3-0 SH 18IN

## (undated) DEVICE — PACK BASIC SETUP SC BR

## (undated) DEVICE — CAUTERY BOVIE PENCIL

## (undated) DEVICE — CABLE PACING ALLGTR CLIP 12

## (undated) DEVICE — TOWEL OR DISP STRL BLUE 4/PK

## (undated) DEVICE — DRAPE MOBILE C-ARM

## (undated) DEVICE — SUT SILK 0 SUTUPAK SA86H

## (undated) DEVICE — GLOVE SURG BIOGEL LATEX SZ 7.5

## (undated) DEVICE — SPONGE LAP 18X18 PREWASHED

## (undated) DEVICE — SOL NORMAL USPCA 0.9%

## (undated) DEVICE — DRAPE INCISE IOBAN 2 23X17IN

## (undated) DEVICE — FIXATE SUTURING DEVICE

## (undated) DEVICE — SCRUB HIBICLENS 4% CHG 4OZ

## (undated) DEVICE — SUT 1 48IN PDS II VIO MONO

## (undated) DEVICE — GAUZE SPONGE 4X4 12PLY

## (undated) DEVICE — SUT 2/0 30IN SILK BLK BRAI

## (undated) DEVICE — PAD DEFIB CADENCE ADULT R2